# Patient Record
Sex: FEMALE | Race: WHITE | NOT HISPANIC OR LATINO | Employment: UNEMPLOYED | URBAN - METROPOLITAN AREA
[De-identification: names, ages, dates, MRNs, and addresses within clinical notes are randomized per-mention and may not be internally consistent; named-entity substitution may affect disease eponyms.]

---

## 2017-01-06 ENCOUNTER — TRANSFERRED RECORDS (OUTPATIENT)
Dept: HEALTH INFORMATION MANAGEMENT | Facility: CLINIC | Age: 32
End: 2017-01-06

## 2017-01-06 LAB
CREAT SERPL-MCNC: 1.69 MG/DL (ref 0.4–1)
GFR SERPL CREATININE-BSD FRML MDRD: 35 ML/MIN/1.73M2
GLUCOSE SERPL-MCNC: 187 MG/DL (ref 70–100)
POTASSIUM SERPL-SCNC: 3.7 MEQ/L (ref 3.4–5.1)

## 2017-02-17 ENCOUNTER — TRANSFERRED RECORDS (OUTPATIENT)
Dept: HEALTH INFORMATION MANAGEMENT | Facility: CLINIC | Age: 32
End: 2017-02-17

## 2017-02-27 ENCOUNTER — OFFICE VISIT (OUTPATIENT)
Dept: FAMILY MEDICINE | Facility: CLINIC | Age: 32
End: 2017-02-27
Payer: MEDICARE

## 2017-02-27 ENCOUNTER — ANTICOAGULATION THERAPY VISIT (OUTPATIENT)
Dept: ANTICOAGULATION | Facility: CLINIC | Age: 32
End: 2017-02-27
Payer: MEDICARE

## 2017-02-27 VITALS
TEMPERATURE: 99.4 F | HEART RATE: 87 BPM | DIASTOLIC BLOOD PRESSURE: 67 MMHG | HEIGHT: 65 IN | BODY MASS INDEX: 30.82 KG/M2 | WEIGHT: 185 LBS | SYSTOLIC BLOOD PRESSURE: 130 MMHG

## 2017-02-27 DIAGNOSIS — I82.622 DEEP VEIN THROMBOSIS (DVT) OF LEFT UPPER EXTREMITY, UNSPECIFIED CHRONICITY, UNSPECIFIED VEIN (H): ICD-10-CM

## 2017-02-27 DIAGNOSIS — Z51.81 ANTICOAGULATION GOAL OF INR 2 TO 3: ICD-10-CM

## 2017-02-27 DIAGNOSIS — Z79.01 ANTICOAGULATION GOAL OF INR 2 TO 3: ICD-10-CM

## 2017-02-27 DIAGNOSIS — Z79.01 LONG TERM CURRENT USE OF ANTICOAGULANT THERAPY: ICD-10-CM

## 2017-02-27 DIAGNOSIS — I82.409 DVT (DEEP VENOUS THROMBOSIS) (H): Primary | ICD-10-CM

## 2017-02-27 DIAGNOSIS — N18.30 CKD (CHRONIC KIDNEY DISEASE) STAGE 3, GFR 30-59 ML/MIN (H): ICD-10-CM

## 2017-02-27 DIAGNOSIS — I82.409 DVT (DEEP VENOUS THROMBOSIS) (H): ICD-10-CM

## 2017-02-27 DIAGNOSIS — E11.22 TYPE 2 DIABETES MELLITUS WITH STAGE 3 CHRONIC KIDNEY DISEASE, WITHOUT LONG-TERM CURRENT USE OF INSULIN (H): Primary | ICD-10-CM

## 2017-02-27 DIAGNOSIS — I10 ESSENTIAL HYPERTENSION: ICD-10-CM

## 2017-02-27 DIAGNOSIS — N18.30 TYPE 2 DIABETES MELLITUS WITH STAGE 3 CHRONIC KIDNEY DISEASE, WITHOUT LONG-TERM CURRENT USE OF INSULIN (H): Primary | ICD-10-CM

## 2017-02-27 DIAGNOSIS — C25.9 MALIGNANT NEOPLASM OF PANCREAS, UNSPECIFIED LOCATION OF MALIGNANCY (H): ICD-10-CM

## 2017-02-27 PROBLEM — F31.9 BIPOLAR DISORDER (H): Status: ACTIVE | Noted: 2017-02-27

## 2017-02-27 LAB
ANION GAP SERPL CALCULATED.3IONS-SCNC: 8 MMOL/L (ref 3–14)
BUN SERPL-MCNC: 26 MG/DL (ref 7–30)
CALCIUM SERPL-MCNC: 9.6 MG/DL (ref 8.5–10.1)
CHLORIDE SERPL-SCNC: 104 MMOL/L (ref 94–109)
CO2 SERPL-SCNC: 24 MMOL/L (ref 20–32)
CREAT SERPL-MCNC: 1.54 MG/DL (ref 0.52–1.04)
CREAT UR-MCNC: 148 MG/DL
ERYTHROCYTE [DISTWIDTH] IN BLOOD BY AUTOMATED COUNT: 14.5 % (ref 10–15)
GFR SERPL CREATININE-BSD FRML MDRD: 39 ML/MIN/1.7M2
GLUCOSE SERPL-MCNC: 84 MG/DL (ref 70–99)
HBA1C MFR BLD: 6.6 % (ref 4.3–6)
HCT VFR BLD AUTO: 38.3 % (ref 35–47)
HGB BLD-MCNC: 12.1 G/DL (ref 11.7–15.7)
INR PPP: 2.48 (ref 0.86–1.14)
MCH RBC QN AUTO: 29.7 PG (ref 26.5–33)
MCHC RBC AUTO-ENTMCNC: 31.6 G/DL (ref 31.5–36.5)
MCV RBC AUTO: 94 FL (ref 78–100)
MICROALBUMIN UR-MCNC: 27 MG/L
MICROALBUMIN/CREAT UR: 18.45 MG/G CR (ref 0–25)
PLATELET # BLD AUTO: 474 10E9/L (ref 150–450)
POTASSIUM SERPL-SCNC: 4 MMOL/L (ref 3.4–5.3)
RBC # BLD AUTO: 4.08 10E12/L (ref 3.8–5.2)
SODIUM SERPL-SCNC: 136 MMOL/L (ref 133–144)
WBC # BLD AUTO: 20 10E9/L (ref 4–11)

## 2017-02-27 PROCEDURE — 85027 COMPLETE CBC AUTOMATED: CPT | Performed by: NURSE PRACTITIONER

## 2017-02-27 PROCEDURE — 99203 OFFICE O/P NEW LOW 30 MIN: CPT | Performed by: NURSE PRACTITIONER

## 2017-02-27 PROCEDURE — 36415 COLL VENOUS BLD VENIPUNCTURE: CPT | Performed by: NURSE PRACTITIONER

## 2017-02-27 PROCEDURE — 99207 ZZC NO CHARGE NURSE ONLY: CPT

## 2017-02-27 PROCEDURE — 80048 BASIC METABOLIC PNL TOTAL CA: CPT | Performed by: NURSE PRACTITIONER

## 2017-02-27 PROCEDURE — 83036 HEMOGLOBIN GLYCOSYLATED A1C: CPT | Performed by: NURSE PRACTITIONER

## 2017-02-27 PROCEDURE — 82043 UR ALBUMIN QUANTITATIVE: CPT | Performed by: NURSE PRACTITIONER

## 2017-02-27 PROCEDURE — 85610 PROTHROMBIN TIME: CPT | Performed by: NURSE PRACTITIONER

## 2017-02-27 RX ORDER — CHLORTHALIDONE 25 MG/1
12.5 TABLET ORAL DAILY
COMMUNITY
End: 2017-04-13

## 2017-02-27 RX ORDER — LORATADINE 10 MG/1
10 TABLET ORAL EVERY MORNING
COMMUNITY
End: 2017-04-13

## 2017-02-27 RX ORDER — WARFARIN SODIUM 2 MG/1
2 TABLET ORAL
COMMUNITY
End: 2017-02-27

## 2017-02-27 RX ORDER — MONTELUKAST SODIUM 10 MG/1
10 TABLET ORAL AT BEDTIME
COMMUNITY
End: 2017-04-13

## 2017-02-27 RX ORDER — AMLODIPINE BESYLATE 5 MG/1
5 TABLET ORAL DAILY
COMMUNITY
End: 2017-04-13

## 2017-02-27 RX ORDER — TRAZODONE HYDROCHLORIDE 100 MG/1
100 TABLET ORAL AT BEDTIME
COMMUNITY
End: 2017-04-13

## 2017-02-27 RX ORDER — GLIPIZIDE 5 MG/1
5 TABLET ORAL
Qty: 30 TABLET | Refills: 3 | Status: SHIPPED | OUTPATIENT
Start: 2017-02-27 | End: 2017-04-13

## 2017-02-27 RX ORDER — FLUTICASONE PROPIONATE 50 MCG
2 SPRAY, SUSPENSION (ML) NASAL DAILY
COMMUNITY
End: 2017-04-13

## 2017-02-27 RX ORDER — LITHIUM CARBONATE 300 MG
300 TABLET ORAL EVERY EVENING
COMMUNITY
End: 2017-05-18

## 2017-02-27 RX ORDER — WARFARIN SODIUM 2 MG/1
TABLET ORAL
Qty: 30 TABLET | COMMUNITY
Start: 2017-02-27 | End: 2017-03-17

## 2017-02-27 RX ORDER — MULTIVIT-MIN/IRON/FOLIC ACID/K 18-600-40
1 CAPSULE ORAL DAILY
COMMUNITY
End: 2017-04-13

## 2017-02-27 RX ORDER — ARIPIPRAZOLE 30 MG/1
30 TABLET ORAL DAILY
COMMUNITY

## 2017-02-27 RX ORDER — NALTREXONE HYDROCHLORIDE 50 MG/1
50 TABLET, FILM COATED ORAL EVERY MORNING
COMMUNITY
End: 2018-01-09

## 2017-02-27 RX ORDER — CLOZAPINE 100 MG/1
100 TABLET ORAL
COMMUNITY
End: 2024-05-07

## 2017-02-27 NOTE — PROGRESS NOTES
"  SUBJECTIVE:                                                    Divina Delgadillo is a 31 year old female who presents to clinic today to establish care. The patient moved from [place] on 2/24, transferring care. She was placed in different adult foster care.  She has multiple health issues.   Divina has history of diabetes type 2, she is currently on Metformin 1000 mg TWICE DAILY, according to records from [facility] last A1C 6.2% in November 2016. Divina states that she still gets occasional diarrhea from Metformin, she never tried ER Metformin. She reports history of CKD stage 3 and according to her chart her last Creatinine level was 1.69 on 1/25/17, her baseline Creatinine is 1.36 with GFR between 35-45. It is unclear to me why this patient is still on Metformin, I will check her renal function today and her Creatinine still remains over 1.4 will d/c Metformin. She is also on Omeprazole, per patient statement have been taking it \"for years\", the patient reports that she cannot be off Omeprazole because it will significantly aggravate her GERD; she is also on Zantac and she states that she is following GERD diet.   The patient had DVT of the left upper extremity, radial vein 2 months ago. This was unprovoked DVT, no prior history of thrombosis, she is currently on Coumadin, she usually takes 4 mg every Wednesdays and 5 mg on other days, she checks INR's weekly. Her last INR was 3.7 on 2/22, per patient statement no Coumadin dose adjustments were made, her INR on 2/13 was 3.2 and on 2/6-1.4. She will need to remain on anticoagulation therapy for another 4 months.   She has history of hypertension, take Norvasc 5 mg daily PM< her BP is under good control.    The patient has history of neoplasm of the pancreas. She had splenectomy and partial pancreatic resection. She was following oncologist in [place].  The patient has history of bipolar disorder she is currently on Lithium Clozaril and Prozac. She is also on " "Revia, this was not discussed in clinic today why she is on Revia, will defer to psychiatrist.     Problem list and histories reviewed & adjusted, as indicated.  Additional history: as documented    Problem list, Medication list, Allergies, and Medical/Social/Surgical histories reviewed in EPIC and updated as appropriate.    ROS:  C: NEGATIVE for fever, chills, change in weight  E/M: NEGATIVE for ear, mouth and throat problems  R: NEGATIVE for significant cough or SOB  CV: NEGATIVE for chest pain, palpitations or peripheral edema  GI: NEGATIVE for nausea, abdominal pain, heartburn, or change in bowel habits  PSYCHIATRIC: NEGATIVE for changes in mood or affect    OBJECTIVE:                                                    /67 (BP Location: Right arm, Patient Position: Chair, Cuff Size: Adult Large)  Pulse 87  Temp 99.4  F (37.4  C) (Tympanic)  Ht 5' 4.5\" (1.638 m)  Wt 185 lb (83.9 kg)  BMI 31.26 kg/m2  Body mass index is 31.26 kg/(m^2).  GENERAL: healthy, alert and no distress  RESP: lungs clear to auscultation - no rales, rhonchi or wheezes  CV: regular rates and rhythm, grade 3/6 systolic murmur heard best over the apex, peripheral pulses strong and no peripheral edema  ABDOMEN: soft, nontender, no hepatosplenomegaly, no masses and bowel sounds normal  PSYCH: mentation appears normal, affect normal/bright    Diagnostic Test Results:  CBC  BMP  A1C  INR  Urine micro-albumin        ASSESSMENT/PLAN:                                                      BMI:   Estimated body mass index is 31.26 kg/(m^2) as calculated from the following:    Height as of this encounter: 5' 4.5\" (1.638 m).    Weight as of this encounter: 185 lb (83.9 kg).   Weight management plan: Discussed healthy diet and exercise guidelines and patient will follow up in 3 months in clinic to re-evaluate.      1. Deep vein thrombosis (DVT) of left upper extremity, unspecified chronicity, unspecified vein (H)  -unprovoked DVT, continue Coumadin " therapy for 6 months   - INR CLINIC REFERRAL  - INR  - CBC with platelets  -CBC showed leukocytosis and slight thrombocytosis likely due to psych meds-Lithium, recommended to discuss medications and side effects with psychiatrist. Med adjustment per psychiatrist.     2. Anticoagulation goal of INR 2 to 3  - INR CLINIC REFERRAL  - INR  - CARE COORDINATION REFERRAL  -INR 2.48  -continue same dosin mg on  and 5 mg on other days  -recheck INR in 1 week    3. Essential hypertension   -well controlled   - continue amLODIPine (NORVASC) 5 MG tablet; Take 5 mg by mouth daily  - NEPHROLOGY ADULT REFERRAL  - CARE COORDINATION REFERRAL    4. Malignant neoplasm of pancreas, unspecified location of malignancy (H)  - US Abdomen Complete; Future  - CARE COORDINATION REFERRAL  -refer to oncology     5. CKD (chronic kidney disease) stage 3, GFR 30-59 ml/min  - Basic metabolic panel  - NEPHROLOGY ADULT REFERRAL  - CBC with platelets  - CARE COORDINATION REFERRAL  -d/c Metformin    6. Type 2 diabetes mellitus with stage 3 chronic kidney disease, without long-term current use of insulin (H)  - Hemoglobin A1c  - Albumin Random Urine Quantitative  - CARE COORDINATION REFERRAL  - start glipiZIDE (GLUCOTROL) 5 MG tablet; Take 1 tablet (5 mg) by mouth every morning (before breakfast)  Dispense: 30 tablet; Refill: 3  -monitor BG's  -follow up in 3 months     See Patient Instructions    VERONIQUE Spears [facility]

## 2017-02-27 NOTE — NURSING NOTE
"Chief Complaint   Patient presents with     Establish Care     Referral     Request for referral to Nephrology, INR clinic, Care Corrdination, Dietician       Initial /67 (BP Location: Right arm, Patient Position: Chair, Cuff Size: Adult Large)  Pulse 87  Temp 99.4  F (37.4  C) (Tympanic)  Ht 5' 4.5\" (1.638 m)  Wt 185 lb (83.9 kg)  BMI 31.26 kg/m2 Estimated body mass index is 31.26 kg/(m^2) as calculated from the following:    Height as of this encounter: 5' 4.5\" (1.638 m).    Weight as of this encounter: 185 lb (83.9 kg).  Medication Reconciliation: complete    "

## 2017-02-27 NOTE — PROGRESS NOTES
ANTICOAGULATION FOLLOW-UP CLINIC VISIT    Patient Name:  Divina Delgadillo  Date:  2/27/2017  Contact Type:  Telephone/ Lorin from group home    SUBJECTIVE:     Patient Findings     Positives No Problem Findings    Comments Writer called and spoke with Lorin, the lady working with patient in the group home. Per her report, the most recent INRs listed in the chart may not be accurate as they came from patient's own memory. She has been taking 4mg Wednesdays, 5mg all other days. She has been on warfarin for 2 months, and needs to complete at least another 4 months. Lorin is very overwhelmed with all of the medications and appointments that need to be scheduled for the patient. A new consult was made for patient next week in [facility]. Since she had her INR drawn today, there is no reason she needs to come back tomorrow (appointment will be cancelled). According to the patient, she was having her INR drawn weekly.     Lorin will plan on bringing a list of patient's medications to her appointment next week.            OBJECTIVE    INR   Date Value Ref Range Status   02/27/2017 2.48 (H) 0.86 - 1.14 Final       ASSESSMENT / PLAN  No question data found.  Anticoagulation Summary as of 2/27/2017     INR goal 2.0-3.0   Today's INR 2.48   Maintenance plan 4 mg (2 mg x 2) on Wed; 5 mg (2 mg x 2.5) all other days   Full instructions 4 mg on Wed; 5 mg all other days   Weekly total 34 mg   Plan last modified Anastacia Oscar RN (2/27/2017)   Next INR check 3/7/2017   Priority INR   Target end date     Indications   DVT (deep venous thrombosis) (H) [I82.409]  Long term current use of anticoagulant therapy [Z79.01]         Anticoagulation Episode Summary     INR check location     Preferred lab     Send INR reminders to CL ANTICOAG POOL    Comments * Transfer of care, moved to new group home      Anticoagulation Care Providers     Provider Role Specialty Phone number    Jaleesa Velasquez APRN CNP Referring Nurse Practitioner  - Gerontology [phone]            See the Encounter Report to view Anticoagulation Flowsheet and Dosing Calendar (Go to Encounters tab in chart review, and find the Anticoagulation Therapy Visit)        Anastacia Oscar RN

## 2017-02-27 NOTE — MR AVS SNAPSHOT
After Visit Summary   2/27/2017    Divina Delgadillo    MRN: [id number]           Patient Information     Date Of Birth          1985        Visit Information        Provider Department      2/27/2017 10:00 AM Jaleesa Velasquez APRN [facility]        Today's Diagnoses     Type 2 diabetes mellitus with stage 1 chronic kidney disease, without long-term current use of insulin (H)    -  1    Deep vein thrombosis (DVT) of left upper extremity, unspecified chronicity, unspecified vein (H)        Anticoagulation goal of INR 2 to 3        Essential hypertension        CKD (chronic kidney disease) stage 2, GFR 60-89 ml/min        Malignant neoplasm of pancreas, unspecified location of malignancy (H)          Care Instructions    INR clinic referral , nephrology    Labs today: INR, electrolytes, kidney function, A1C, urine micro albumin test   Abdominal US            Thank you for choosing [facility].  You may be receiving a survey in the mail from [employer] regarding your visit today.  Please take a few minutes to complete and return the survey to let us know how we are doing.      If you have questions or concerns, please contact us via [employer] or you can contact your care team at [phone].    Our Clinic hours are:  Monday 6:40 am  to 7:00 pm  Tuesday -Friday 6:40 am to 5:00 pm    The [place] outpatient lab hours are:  Monday - Friday 6:10 am to 4:45 pm  Saturdays 7:00 am to 11:00 am  Appointments are required, call [phone]    If you have clinical questions after hours or would like to schedule an appointment,  call the clinic at [phone].          Follow-ups after your visit        Additional Services     INR CLINIC REFERRAL       Your provider has referred you to INR Services.    Please be aware that coverage of these services is subject to the terms and limitations of your health insurance plan.  Call member services at your health plan with any benefit or  coverage questions.    Indication for Anticoagulation: DVT (first time)  If nonstandard INR is desired, indicate goal range and explanation: INR 2-3  Expected Duration of Therapy: 6 Months            NEPHROLOGY ADULT REFERRAL       Your provider has referred you to: [facility]: [facility] [phone]   [email or web address]    Please be aware that coverage of these services is subject to the terms and limitations of your health insurance plan.  Call member services at your health plan with any benefit or coverage questions.      Reason for referral:  [place] eGFR < 60 ml/min AND blood presure persistently > 130/80 despite antihypertensive management.  Please bring the following to your appointment:    >>   Any x-rays, CTs or MRIs which have been performed.  Contact the facility where they were done to arrange for  prior to your scheduled appointment.   >>   List of current medications   >>   This referral request   >>   Any documents/labs given to you for this referral                  Future tests that were ordered for you today     Open Future Orders        Priority Expected Expires Ordered    US Abdomen Complete Routine 5/28/2017 2/27/2018 2/27/2017            Who to contact     If you have questions or need follow up information about today's clinic visit or your schedule please contact [facility] directly at [phone].  Normal or non-critical lab and imaging results will be communicated to you by MyChart, letter or phone within 4 business days after the clinic has received the results. If you do not hear from us within 7 days, please contact the clinic through MyChart or phone. If you have a critical or abnormal lab result, we will notify you by phone as soon as possible.  Submit refill requests through [employer] or call your pharmacy and they will forward the refill request to  "us. Please allow 3 business days for your refill to be completed.          Additional Information About Your Visit        MyChart Information     iGuiders lets you send messages to your doctor, view your test results, renew your prescriptions, schedule appointments and more. To sign up, go to [email or web address] . Click on \"Log in\" on the left side of the screen, which will take you to the Welcome page. Then click on \"Sign up Now\" on the right side of the page.     You will be asked to enter the access code listed below, as well as some personal information. Please follow the directions to create your username and password.     Your access code is: [email or web address]  Expires: 2017 11:05 AM     Your access code will  in 90 days. If you need help or a new code, please call your [place] clinic or [phone].        Care EveryWhere ID     This is your [facility] EveryWhere ID. This could be used by other organizations to access your [place] medical records  [id number]        Your Vitals Were     Pulse Temperature Height BMI (Body Mass Index)          87 99.4  F (37.4  C) (Tympanic) 5' 4.5\" (1.638 m) 31.26 kg/m2         Blood Pressure from Last 3 Encounters:   17 130/67   11 124/71   09 116/60    Weight from Last 3 Encounters:   17 185 lb (83.9 kg)   09 200 lb (90.7 kg)   09 192 lb 6.4 oz (87.3 kg)              We Performed the Following     Albumin Random Urine Quantitative     Basic metabolic panel     Hemoglobin A1c     INR CLINIC REFERRAL     INR     NEPHROLOGY ADULT REFERRAL          Today's Medication Changes          These changes are accurate as of: 17 11:05 AM.  If you have any questions, ask your nurse or doctor.               These medicines have changed or have updated prescriptions.        Dose/Directions    Clozapine 200 MG tablet   Commonly known as:  CLOZARIL   This may have changed:  Another medication with the same name was removed. Continue taking this " medication, and follow the directions you see here.   Changed by:  Jaleesa Velasquez APRN CNP        Dose:  200 mg   Take 200 mg by mouth 2 times daily   Refills:  0       metFORMIN 1000 MG tablet   Commonly known as:  GLUCOPHAGE   This may have changed:  Another medication with the same name was removed. Continue taking this medication, and follow the directions you see here.   Changed by:  Jaleesa Velasquez APRN CNP        Dose:  1000 mg   Take 1,000 mg by mouth 2 times daily (with meals)   Refills:  0                Primary Care Provider Office Phone # Fax #    VERONIQUE Bledsoe [phone]       [facility] [address] [address]        Thank you!     Thank you for choosing [facility]  for your care. Our goal is always to provide you with excellent care. Hearing back from our patients is one way we can continue to improve our services. Please take a few minutes to complete the written survey that you may receive in the mail after your visit with us. Thank you!             Your Updated Medication List - Protect others around you: Learn how to safely use, store and throw away your medicines at www.disposemymeds.org.          This list is accurate as of: 2/27/17 11:05 AM.  Always use your most recent med list.                   Brand Name Dispense Instructions for use    ABILIFY 30 MG tablet   Generic drug:  ARIPiprazole      Take 30 mg by mouth daily       amLODIPine 5 MG tablet    NORVASC     Take 5 mg by mouth daily       blood glucose lancets standard    no brand specified    1    needs lancet        blood glucose monitoring test strip    no brand specified     by In Vitro route daily Use to test blood sugar 1 times daily  And as needed ACCU Check Haylee Plus Test Strips       chlorthalidone 25 MG tablet    HYGROTON     Take 12.5 mg by mouth daily       Clozapine 200 MG tablet    CLOZARIL     Take 200 mg by mouth 2  times daily       KENNY VINYL GLOVES Misc     1 box , medium    Staff to use for applicatin of acne med.       FLUoxetine 20 MG capsule    PROzac     Take 20 mg by mouth daily       fluticasone 50 MCG/ACT spray    FLONASE     Spray 2 sprays into both nostrils daily       lithium 300 MG tablet      Take 300 mg by mouth every evening       metFORMIN 1000 MG tablet    GLUCOPHAGE     Take 1,000 mg by mouth 2 times daily (with meals)       montelukast 10 MG tablet    SINGULAIR     Take 10 mg by mouth At Bedtime       omeprazole 20 MG CR capsule    priLOSEC    60    1 capsule bid       PREVIDENT 5000 BOOSTER 1.1 % Pste   Generic drug:  Sodium Fluoride      Brush Teeth every morning and evening       PULMICORT FLEXHALER 90 MCG/ACT Aepb   Generic drug:  BUDESONIDE (INHALATION)      Inhale 1 puff into the lungs 2 times daily       ranitidine 300 MG tablet    ZANTAC     Take by mouth At Bedtime       traZODone 100 MG tablet    DESYREL     Take 100 mg by mouth At Bedtime       Vitamin D (Cholecalciferol) 1000 UNITS Tabs      Take 1 tablet by mouth daily       warfarin 2 MG tablet    COUMADIN     Take 2 mg by mouth 2 tablets every Wednesday, take 2 1/2 tabs all other days

## 2017-02-27 NOTE — MR AVS SNAPSHOT
Divina Delgadillo   2/27/2017   Anticoagulation Therapy Visit    Description:  31 year old female   Provider:  Anastacia Oscar, RN   Department:  [facility]           INR as of 2/27/2017     Today's INR 2.48      Anticoagulation Summary as of 2/27/2017     INR goal 2.0-3.0   Today's INR 2.48   Full instructions 4 mg on Wed; 5 mg all other days   Next INR check 3/7/2017    Indications   DVT (deep venous thrombosis) (H) [I82.409]  Long term current use of anticoagulant therapy [Z79.01]         Your next Anticoagulation Clinic appointment(s)     Mar 07, 2017 10:30 AM CST   [facility]agulation [facility] with CL [facility]   [facility]    [address] [address]   [phone]              February 2017 Details    Sun Mon Tue Wed Thu Fri Sat        1               2               3               4                 5               6               7               8               9               10               11                 12               13               14               15               16               17               18                 19               20               21               22               23               24               25                 26               27      5 mg   See details      28      5 mg              Date Details   02/27 This INR check               How to take your warfarin dose     To take:  5 mg Take 2.5 of the 2 mg tablets.           March 2017 Details    Sun Mon Tue Wed Thu Fri Sat        1      4 mg         2      5 mg         3      5 mg         4      5 mg           5      5 mg         6      5 mg         7            8               9               10               11                 12               13               14               15               16               17               18                 19               20               21               22               23               24                25                 26               27               28               29               30               31                 Date Details   No additional details    Date of next INR:  3/7/2017         How to take your warfarin dose     To take:  4 mg Take 2 of the 2 mg tablets.    To take:  5 mg Take 2.5 of the 2 mg tablets.

## 2017-02-27 NOTE — PATIENT INSTRUCTIONS
INR clinic referral , nephrology    Labs today: INR, electrolytes, kidney function, A1C, urine micro albumin test   Abdominal US            Thank you for choosing [facility].  You may be receiving a survey in the mail from [employer]apps regarding your visit today.  Please take a few minutes to complete and return the survey to let us know how we are doing.      If you have questions or concerns, please contact us via [employer] or you can contact your care team at [phone].    Our Clinic hours are:  Monday 6:40 am  to 7:00 pm  Tuesday -Friday 6:40 am to 5:00 pm    The [place] outpatient lab hours are:  Monday - Friday 6:10 am to 4:45 pm  Saturdays 7:00 am to 11:00 am  Appointments are required, call [phone]    If you have clinical questions after hours or would like to schedule an appointment,  call the clinic at [phone].

## 2017-02-28 ENCOUNTER — TELEPHONE (OUTPATIENT)
Dept: FAMILY MEDICINE | Facility: CLINIC | Age: 32
End: 2017-02-28

## 2017-02-28 DIAGNOSIS — N18.30 CKD (CHRONIC KIDNEY DISEASE) STAGE 3, GFR 30-59 ML/MIN (H): Primary | ICD-10-CM

## 2017-03-02 ENCOUNTER — HOSPITAL ENCOUNTER (OUTPATIENT)
Dept: ULTRASOUND IMAGING | Facility: CLINIC | Age: 32
Discharge: HOME | End: 2017-03-02
Attending: NURSE PRACTITIONER | Admitting: NURSE PRACTITIONER
Payer: MEDICARE

## 2017-03-02 DIAGNOSIS — C25.9 MALIGNANT NEOPLASM OF PANCREAS, UNSPECIFIED LOCATION OF MALIGNANCY (H): ICD-10-CM

## 2017-03-02 PROCEDURE — 76700 US EXAM ABDOM COMPLETE: CPT

## 2017-03-03 ENCOUNTER — CARE COORDINATION (OUTPATIENT)
Dept: CARE COORDINATION | Facility: CLINIC | Age: 32
End: 2017-03-03

## 2017-03-03 ENCOUNTER — TELEPHONE (OUTPATIENT)
Dept: FAMILY MEDICINE | Facility: CLINIC | Age: 32
End: 2017-03-03

## 2017-03-03 DIAGNOSIS — Z86.718 HISTORY OF DEEP VENOUS THROMBOSIS: ICD-10-CM

## 2017-03-03 DIAGNOSIS — I10 ESSENTIAL HYPERTENSION, BENIGN: ICD-10-CM

## 2017-03-03 DIAGNOSIS — N18.30 CRD (CHRONIC RENAL DISEASE), STAGE III (H): ICD-10-CM

## 2017-03-03 DIAGNOSIS — Z51.81 ANTICOAGULATION GOAL OF INR 2 TO 3: ICD-10-CM

## 2017-03-03 DIAGNOSIS — Z79.01 ANTICOAGULATION GOAL OF INR 2 TO 3: ICD-10-CM

## 2017-03-03 DIAGNOSIS — Z79.899 MEDICATION MANAGEMENT: Primary | ICD-10-CM

## 2017-03-03 NOTE — PROGRESS NOTES
Clinic Care Coordination Contact  OUTREACH    Referral Information:  Referral Source: PCP  Reason for Contact: New Referral        Clinical Concerns:  Current Medical Concerns: RN CC called to f/u with Lorin, Caregiver for pt at foster home. Pt moved into the foster home 1 week ago. Caregiver is overwhelmed with the amount of apts and medications the pt is on. RN CC reviewed recommendations with caregiver and all requested apts have been make. Reassurance provded to caregiver that she is doing a great job.   Current Behavioral Concerns: Pt sees Patrica Vargas in [place], next apt 3/23/17. Pt also sees a family Therapist - Laci Kruse [facility], see's this provider weekly.     Education Provided to patient: medications and apts      Clinical Pathway: None    Medication Management:  Medications reviewed. All questions answered. Home care will be ordered to assist caregiver in the home further with medication education. Request sent to PCP     Functional Status:  Transportation: Caregiver     Psychosocial:  Financial/Insurance: No concerns     Resources and Interventions:  Current Resources:  Requested home care orders     Goals:   Goal 1 Statement: Caregiver wants support to ensure patients medical and mental health needs are being met.  Goal 1 Progression Percent: 50%  Goal 1 Progression Date: 03/03/17  Frequency of Care Coordination: every 1-4 weeks        Plan:   1. Home care orders requested.   2. RN CC will mail out complex care plan and will f/u with caregiver again in 2-4 weeks.     Sobeida Greer RN-BSN  RN Clinic Care Coordinator  [facility]  [phone]

## 2017-03-03 NOTE — LETTER
[place] CARE COORDINATION  [address] [address]  Phone: [phone]      March 28, 2017      Divina Delgadillo c/o Lorin Knight  [address] [address]    Dear Divina,  I am the Clinic Care Coordinator that works with your primary care provider's clinic. I wanted to introduce myself and provide you with my contact information for you to be able to call me with any questions or concerns. Below is a description of what Clinic Care Coordination is and how I can further assist you.     The Clinic Care Coordinator role is a Registered Nurse and/or  who understands the health care system. The goal of Clinic Care Coordination is to help you manage your health and improve access to the [facility] system in the most efficient manner.  The Registered Nurse can assist you in meeting your health care goals by providing education, coordinating services, and strengthening the communication among your providers. The  can assist you with financial, behavioral, psychosocial, and chemical dependency and counseling/psychiatric resources.    Please feel free to keep this letter and contact information to contact me at [phone] with any further questions or concerns that may arise. We at [facility] are focused on providing you with the highest-quality healthcare experience possible and that all starts with you.       Sincerely,     Sobeida Greer    Enclosed: I have enclosed a copy of the Complex Care Plan. This has helpful information and goals that we have talked about. Please keep this in an easy to access place to use as needed.

## 2017-03-03 NOTE — TELEPHONE ENCOUNTER
RN CC spoke with Lorin, Caregiver at pt's new foster home. She is overwhelmed with medications and complex medical conditions.   Please sign off on home care referral. Order pended.     Sobeida Greer RN-BSN  RN Clinic Care Coordinator  [facility]  [phone]

## 2017-03-03 NOTE — LETTER
[facility]  Complex Care Plan  About Me  Patient Name:  Divina Delgadillo    YOB: 1985  Age:   32 year old   [place] MRN: [id number] Telephone Information:     Home Phone [phone]   Mobile [phone]       Address:    [address] [facility] [address] Email address:  No e-mail address on record      Emergency Contact(s)  Name Relationship Lgl Grd Work Phone Home Phone Mobile Phone   1. EDOUARD CLAY Grandparent No none [phone] none   2. GIGI DE PAZ Mother No none [phone] none   3. JUAN R DOAN Parent    [phone]           Primary language:  English     needed? No   [place] [facility]:  [phone] op. 1  Other communication barriers: No  Preferred Method of Communication:  Phone  Current living arrangement: I live in a private home ([facility])  Mobility Status/ Medical Equipment: Independent    Health Maintenance  Health Maintenance Reviewed: Up to date    My Access Plan  Medical Emergency 911   Primary Clinic Line [facility]- [phone]   24 Hour Appointment Line [phone] or  [phone]) (toll-free)   24 Hour Nurse Line [phone] (toll-free)   Preferred Urgent Care [facility], [phone]   Preferred Hospital [place], [place]  [phone]   Preferred Pharmacy MAIL ORDER - NO PHONE     Behavioral Health Crisis Line [facility], [phone] or 911     My Care Team Members  Patient Care Team       Relationship Specialty Notifications Start End    Jaleesa Velasquez APRN CNP PCP - General Nurse Practitioner - Gerontology  2/22/17     Phone: [phone] Fax: [phone]         [facility] [address] [address]    Sobeida Greer, RN Clinic Care Coordinator  Admissions 3/3/17     Phone: [phone] Fax: [phone]        Laci Kruse Therapist   3/28/17     Comment:  [facility] [employer] [facility]    Phone:  [phone]         Patrica Vargas Psychiatrist   3/28/17     Comment:  [facility] - not sure of location or # (? with [facility])    Lorin Knight Other (see comments)   3/28/17     Comment:  [facility] Caregiver    Phone: [phone]              My Care Plans  Self Management and Treatment Plan  Goals and (Comments)  Goal #1: Caregiver wants support to ensure patients medical and mental health needs are being met.         Action Plans on File:  None  Advance Care Plans/Directives Type: None       My Medical and Care Information  Problem List   Patient Active Problem List   Diagnosis     Esophageal reflux     non alcoholic fatty liver disease     HTN (hypertension)     HYPERLIPIDEMIA LDL GOAL <100     DVT (deep venous thrombosis) (H)     CKD (chronic kidney disease) stage 3, GFR 30-59 ml/min     Malignant neoplasm of pancreas, unspecified location of malignancy (H)     Type 2 diabetes mellitus with stage 3 chronic kidney disease, without long-term current use of insulin (H)     Anticoagulation goal of INR 2 to 3     Bipolar disorder (H)      Current Medications and Allergies:  See printed Medication Report.    Care Coordination Start Date: 03/03/17   Frequency of Care Coordination: every 1-4 weeks   Form Last Updated: 03/28/2017

## 2017-03-03 NOTE — LETTER
[facility]  [address] [address]  Phone: [phone]      March 28, 2017      Divina Delgadillo c/o Lorin Knight  [address] [facility] [address]    Dear Divina,  I am the Clinic Care Coordinator that works with your primary care provider's clinic. I wanted to introduce myself and provide you with my contact information for you to be able to call me with any questions or concerns. Below is a description of what Clinic Care Coordination is and how I can further assist you.     The Clinic Care Coordinator role is a Registered Nurse and/or  who understands the health care system. The goal of Clinic Care Coordination is to help you manage your health and improve access to the [facility] system in the most efficient manner.  The Registered Nurse can assist you in meeting your health care goals by providing education, coordinating services, and strengthening the communication among your providers. The  can assist you with financial, behavioral, psychosocial, and chemical dependency and counseling/psychiatric resources.    Please feel free to keep this letter and contact information to contact me at [phone] with any further questions or concerns that may arise. We at [facility] are focused on providing you with the highest-quality healthcare experience possible and that all starts with you.       Sincerely,     Sobeida Greer    Enclosed: I have enclosed a copy of the Complex Care Plan. This has helpful information and goals that we have talked about. Please keep this in an easy to access place to use as needed.

## 2017-03-06 NOTE — TELEPHONE ENCOUNTER
Lucila, will you be contacting them ?  (referral says home care will contact the patient)     Let us know if you need us to do anything else, thanks,   Bonnie Hawthorne RNC

## 2017-03-07 ENCOUNTER — TELEPHONE (OUTPATIENT)
Dept: FAMILY MEDICINE | Facility: CLINIC | Age: 32
End: 2017-03-07

## 2017-03-07 ENCOUNTER — ANTICOAGULATION THERAPY VISIT (OUTPATIENT)
Dept: ANTICOAGULATION | Facility: CLINIC | Age: 32
End: 2017-03-07
Payer: MEDICARE

## 2017-03-07 DIAGNOSIS — I82.409 DVT (DEEP VENOUS THROMBOSIS) (H): ICD-10-CM

## 2017-03-07 LAB — INR POINT OF CARE: 5.8 (ref 0.86–1.14)

## 2017-03-07 PROCEDURE — 99207 ZZC NO CHARGE NURSE ONLY: CPT

## 2017-03-07 PROCEDURE — 85610 PROTHROMBIN TIME: CPT | Mod: QW

## 2017-03-07 PROCEDURE — 36416 COLLJ CAPILLARY BLOOD SPEC: CPT

## 2017-03-07 NOTE — MR AVS SNAPSHOT
Divina Delgadillo   3/7/2017 10:30 AM   Anticoagulation Therapy Visit    Description:  31 year old female   Provider:  CL ANTI COAG   Department:  Cl Anticoag           INR as of 3/7/2017     Today's INR 5.8!      Anticoagulation Summary as of 3/7/2017     INR goal 2.0-3.0   Today's INR 5.8!   Full instructions 3/7: Hold; 3/8: Hold; Otherwise 4 mg on Wed; 5 mg all other days   Next INR check 3/10/2017    Indications   DVT (deep venous thrombosis) (H) [I82.409]         Description     Recheck INR 3 days, Fri, 3/10/17  No warfarin Tues or Wed, 5 mg Thurs      Your next Anticoagulation Clinic appointment(s)     Mar 10, 2017 11:30 AM CST   Anticoagulation Visit with CL ANTI COAG   [facility] ([facility])    [address] [facility] [address] [phone]              Contact Numbers     Please call [phone] to cancel and/or reschedule your appointment.  Please call [phone] with any problems or questions regarding your therapy          March 2017 Details    Sun Mon Tue Wed Thu Fri Sat        1               2               3               4                 5               6               7      Hold   See details      8      Hold         9      5 mg         10            11                 12               13               14               15               16               17               18                 19               20               21               22               23               24               25                 26               27               28               29               30               31                 Date Details   03/07 This INR check       Date of next INR:  3/10/2017         How to take your warfarin dose     To take:  5 mg Take 2.5 of the 2 mg tablets.    Hold Do not take your warfarin dose. See the Details table to the right for additional instructions.

## 2017-03-07 NOTE — PROGRESS NOTES
ANTICOAGULATION FOLLOW-UP CLINIC VISIT    Patient Name:  Divina Delgadillo  Date:  3/7/2017  Contact Type:  Face to Face    SUBJECTIVE:     Patient Findings     Positives Change in medications (glucophage was stopped and glipizide was started last week), Change in diet/appetite (pt does not eat any Vit K foods, does not like them but will try some spinach on a subway today.  She does not use alcohol and tobacco use is stable), Bruising (pt states she has bruises on her legs that are new in the last week ), Bleed (clinic visit) (pt reports some blood from her gums when she brushed her teeth last night), Unexplained INR or factor level change (no reason found for the elevated INR today,  pt and care giver answer no to the usual causes of elevating the  INR)    Comments Writer talked with caregiver, Lorin,  She confirmed that nothing has changed since her INR check last week.  Lorin does report that her blood sugar was 186 this morning, which is elevated for her.  (pt came to [facility] with her room mate and not her caregiver)  Writer reviewed s/s of bleeding, what to observe for and how to report if need and to seek immediate medical assistance for bleeding or head trauma.  Verbalized understanding, pt and care giver  Of note, pt does have non alcoholic  fatty liver disease           OBJECTIVE    INR Protime   Date Value Ref Range Status   03/07/2017 5.8 (A) 0.86 - 1.14 Final       ASSESSMENT / PLAN  INR assessment SUPRA    Recheck INR In: 3 DAYS    INR Location Clinic      Anticoagulation Summary as of 3/7/2017     INR goal 2.0-3.0   Today's INR 5.8!   Maintenance plan 4 mg (2 mg x 2) on Wed; 5 mg (2 mg x 2.5) all other days   Full instructions 3/7: Hold; 3/8: Hold; Otherwise 4 mg on Wed; 5 mg all other days   Weekly total 34 mg   Plan last modified Anastacia Oscar RN (2/27/2017)   Next INR check 3/10/2017   Priority INR   Target end date 6/27/2017    Indications   DVT (deep venous thrombosis) (H) [I82.409]          Anticoagulation Episode Summary     INR check location     Preferred lab     Send INR reminders to CL ANTICOAG POOL    Comments * Transfer of care, moved to new group home      Anticoagulation Care Providers     Provider Role Specialty Phone number    Jaleesa Velasquez APRN CNP Responsible Nurse Practitioner - Gerontology [phone]            See the Encounter Report to view Anticoagulation Flowsheet and Dosing Calendar (Go to Encounters tab in chart review, and find the Anticoagulation Therapy Visit)        Myrna Wiseman RN

## 2017-03-07 NOTE — TELEPHONE ENCOUNTER
Incoming records from [facility]. Given to provider for review and scan.    [facility]

## 2017-03-09 ENCOUNTER — CARE COORDINATION (OUTPATIENT)
Dept: CARE COORDINATION | Facility: CLINIC | Age: 32
End: 2017-03-09

## 2017-03-09 NOTE — TELEPHONE ENCOUNTER
RN CC spoke with [facility]. They are scheduled for the home care admission assessment on 3/10/17.     Sobeida Greer RN-BSN  RN Clinic Care Coordinator  [facility]  [phone]

## 2017-03-10 ENCOUNTER — ANTICOAGULATION THERAPY VISIT (OUTPATIENT)
Dept: ANTICOAGULATION | Facility: CLINIC | Age: 32
End: 2017-03-10
Payer: MEDICARE

## 2017-03-10 DIAGNOSIS — I82.409 DVT (DEEP VENOUS THROMBOSIS) (H): ICD-10-CM

## 2017-03-10 LAB — INR POINT OF CARE: 1.6 (ref 0.86–1.14)

## 2017-03-10 PROCEDURE — 36416 COLLJ CAPILLARY BLOOD SPEC: CPT

## 2017-03-10 PROCEDURE — 99207 ZZC NO CHARGE NURSE ONLY: CPT

## 2017-03-10 PROCEDURE — 85610 PROTHROMBIN TIME: CPT | Mod: QW

## 2017-03-10 NOTE — PROGRESS NOTES
ANTICOAGULATION FOLLOW-UP CLINIC VISIT    Patient Name:  Divina Delgadillo  Date:  3/10/2017  Contact Type:  Face to Face    SUBJECTIVE:     Patient Findings     Positives Change in diet/appetite (pt will eat Vit K foos 1-2 times a week ), Intentional hold of therapy (qas directed the last 2 days), No Problem Findings (no other changes or problems that could lower INR)    Comments Care giver came with pt today and took the printed material regarding warfarin therapy, questions answered           OBJECTIVE    INR Protime   Date Value Ref Range Status   03/10/2017 1.6 (A) 0.86 - 1.14 Final       ASSESSMENT / PLAN  INR assessment SUB    Recheck INR In: 1 WEEK    INR Location Clinic      Anticoagulation Summary as of 3/10/2017     INR goal 2.0-3.0   Today's INR 1.6!   Maintenance plan 4 mg (2 mg x 2) on Wed; 5 mg (2 mg x 2.5) all other days   Full instructions 3/10: 7 mg; Otherwise 4 mg on Wed; 5 mg all other days   Weekly total 34 mg   Plan last modified Anastacia Oscar RN (2/27/2017)   Next INR check 3/17/2017   Priority INR   Target end date 6/27/2017    Indications   DVT (deep venous thrombosis) (H) [I82.409]         Anticoagulation Episode Summary     INR check location     Preferred lab     Send INR reminders to CL ANTICOAG POOL    Comments * Transfer of care, moved to new group home      Anticoagulation Care Providers     Provider Role Specialty Phone number    HerreraechoJaleesa APRN CNP Responsible Nurse Practitioner - Gerontology [phone]            See the Encounter Report to view Anticoagulation Flowsheet and Dosing Calendar (Go to Encounters tab in chart review, and find the Anticoagulation Therapy Visit)        Myrna Wiseman, RN

## 2017-03-10 NOTE — MR AVS SNAPSHOT
Divina Delgadillo   3/10/2017 11:30 AM   Anticoagulation Therapy Visit    Description:  31 year old female   Provider:  CL ANTI COAG   Department:  Cl Anticoag           INR as of 3/10/2017     Today's INR 1.6!      Anticoagulation Summary as of 3/10/2017     INR goal 2.0-3.0   Today's INR 1.6!   Full instructions 3/10: 7 mg; Otherwise 4 mg on Wed; 5 mg all other days   Next INR check 3/17/2017    Indications   DVT (deep venous thrombosis) (H) [I82.409]         Description     Re check INR 1 week, 3/17/17  Take warfarin 7 mg daily,  Then 4 mg Wed, 5 mg Mon, Tues, Thurs, Sat, Sun      Your next Anticoagulation Clinic appointment(s)     Mar 17, 2017  9:45 AM CDT   Anticoagulation Visit with CL ANTI COAG   [facility] ([facility]    [address] [id number] [phone]              Contact Numbers     Please call [phone] to cancel and/or reschedule your appointment.  Please call [phone] with any problems or questions regarding your therapy          March 2017 Details    Sun Mon Tue Wed Thu Fri Sat        1               2               3               4                 5               6               7               8               9               10      7 mg   See details      11      5 mg           12      5 mg         13      5 mg         14      5 mg         15      4 mg         16      5 mg         17            18                 19               20               21               22               23               24               25                 26               27               28               29               30               31                 Date Details   03/10 This INR check       Date of next INR:  3/17/2017         How to take your warfarin dose     To take:  4 mg Take 2 of the 2 mg tablets.    To take:  5 mg Take 2.5 of the 2 mg tablets.    To take:  7 mg Take 3.5 of the 2 mg tablets.

## 2017-03-17 ENCOUNTER — ANTICOAGULATION THERAPY VISIT (OUTPATIENT)
Dept: ANTICOAGULATION | Facility: CLINIC | Age: 32
End: 2017-03-17
Payer: MEDICARE

## 2017-03-17 DIAGNOSIS — I82.409 DVT (DEEP VENOUS THROMBOSIS) (H): ICD-10-CM

## 2017-03-17 DIAGNOSIS — Z51.81 ANTICOAGULATION GOAL OF INR 2 TO 3: ICD-10-CM

## 2017-03-17 DIAGNOSIS — Z79.01 ANTICOAGULATION GOAL OF INR 2 TO 3: ICD-10-CM

## 2017-03-17 DIAGNOSIS — N18.30 CKD (CHRONIC KIDNEY DISEASE) STAGE 3, GFR 30-59 ML/MIN (H): ICD-10-CM

## 2017-03-17 DIAGNOSIS — C25.9 MALIGNANT NEOPLASM OF PANCREAS, UNSPECIFIED LOCATION OF MALIGNANCY (H): ICD-10-CM

## 2017-03-17 LAB
ALBUMIN SERPL-MCNC: 3 G/DL (ref 3.4–5)
ALP SERPL-CCNC: 231 U/L (ref 40–150)
ALT SERPL W P-5'-P-CCNC: 91 U/L (ref 0–50)
AST SERPL W P-5'-P-CCNC: 39 U/L (ref 0–45)
BILIRUB DIRECT SERPL-MCNC: <0.1 MG/DL (ref 0–0.2)
BILIRUB SERPL-MCNC: 0.3 MG/DL (ref 0.2–1.3)
CREAT SERPL-MCNC: 1.32 MG/DL (ref 0.52–1.04)
GFR SERPL CREATININE-BSD FRML MDRD: 47 ML/MIN/1.7M2
INR POINT OF CARE: 3.5 (ref 0.86–1.14)
PROT SERPL-MCNC: 7.6 G/DL (ref 6.8–8.8)

## 2017-03-17 PROCEDURE — 99207 ZZC NO CHARGE NURSE ONLY: CPT

## 2017-03-17 PROCEDURE — 85610 PROTHROMBIN TIME: CPT | Mod: QW

## 2017-03-17 PROCEDURE — 80076 HEPATIC FUNCTION PANEL: CPT | Performed by: NURSE PRACTITIONER

## 2017-03-17 PROCEDURE — 36416 COLLJ CAPILLARY BLOOD SPEC: CPT

## 2017-03-17 PROCEDURE — 82565 ASSAY OF CREATININE: CPT | Performed by: NURSE PRACTITIONER

## 2017-03-17 RX ORDER — WARFARIN SODIUM 2 MG/1
TABLET ORAL
Qty: 30 TABLET | Refills: 0 | COMMUNITY
Start: 2017-03-17 | End: 2017-03-24

## 2017-03-17 NOTE — PROGRESS NOTES
ANTICOAGULATION FOLLOW-UP CLINIC VISIT    Patient Name:  Divina Delgadillo  Date:  3/17/2017  Contact Type:  Face to Face    SUBJECTIVE:     Patient Findings     Positives Change in diet/appetite (pt had greens last night and will have a serving of greens today, then resume her usual weekly amount)    Comments We continue to find her weekly maintenance dose           OBJECTIVE    INR Protime   Date Value Ref Range Status   03/17/2017 3.5 (A) 0.86 - 1.14 Final       ASSESSMENT / PLAN  INR assessment SUPRA    Recheck INR In: 1 WEEK    INR Location Clinic      Anticoagulation Summary as of 3/17/2017     INR goal 2.0-3.0   Today's INR 3.5!   Maintenance plan 4 mg (2 mg x 2) on Fri; 5 mg (2 mg x 2.5) all other days   Full instructions 4 mg on Fri; 5 mg all other days   Weekly total 34 mg   Plan last modified Myrna Wiseman RN (3/17/2017)   Next INR check 3/24/2017   Priority INR   Target end date 6/27/2017    Indications   DVT (deep venous thrombosis) (H) [I82.409]         Anticoagulation Episode Summary     INR check location     Preferred lab     Send INR reminders to  [facility] POOL    Comments * Transfer of care, moved to new group home      Anticoagulation Care Providers     Provider Role Specialty Phone number    Jaleesa Velasquez APRN CNP Responsible Nurse Practitioner - Gerontology [phone]            See the Encounter Report to view Anticoagulation Flowsheet and Dosing Calendar (Go to Encounters tab in chart review, and find the Anticoagulation Therapy Visit)        Myrna Wiseman RN

## 2017-03-17 NOTE — MR AVS SNAPSHOT
Divina Delgadillo   3/17/2017 9:45 AM   Anticoagulation Therapy Visit    Description:  31 year old female   Provider:  EL ANTI COAG   Department:  Cl Anticoag           INR as of 3/17/2017     Today's INR 3.5!      Anticoagulation Summary as of 3/17/2017     INR goal 2.0-3.0   Today's INR 3.5!   Full instructions 4 mg on Fri; 5 mg all other days   Next INR check 3/24/2017    Indications   DVT (deep venous thrombosis) (H) [I82.409]         Description     Recheck INR 1 week, 3/24/17  Take warfarin 4 mg Fri, 5 mg rest of week       Your next Anticoagulation Clinic appointment(s)     Mar 24, 2017  1:45 PM CDT   An[facility] with CL ANTI COAG   [facility]    [address] [facility] [id number] [phone]              Contact Numbers     Please call [phone] to cancel and/or reschedule your appointment.  Please call [phone] with any problems or questions regarding your therapy          March 2017 Details    Sun Mon Tue Wed Thu Fri Sat        1               2               3               4                 5               6               7               8               9               10               11                 12               13               14               15               16               17      4 mg   See details      18      5 mg           19      5 mg         20      5 mg         21      5 mg         22      5 mg         23      5 mg         24            25                 26               27               28               29               30               31                 Date Details   03/17 This INR check       Date of next INR:  3/24/2017         How to take your warfarin dose     To take:  4 mg Take 2 of the 2 mg tablets.    To take:  5 mg Take 2.5 of the 2 mg tablets.

## 2017-03-22 ENCOUNTER — PRE VISIT (OUTPATIENT)
Dept: NEPHROLOGY | Facility: CLINIC | Age: 32
End: 2017-03-22

## 2017-03-22 NOTE — TELEPHONE ENCOUNTER
PRE VISIT PATIENT INFORMATION    1.  Reason for the visit:  HTN, CKD stage 3    2.  Referring provider and clinic name:  [facility]    3.  Provider managing care now?  PCP    4. Imaging? Yes, US abdomen    Renal Imaging? Yes in US of abdomen  Vascular Imaging? no    5.  Lab work? Yes    6. Records from outside facilities? No    Requested from outside? No  In Chart?     Hard Copy in Folder?     Has patient ever seen a Urologist? No    If yes, where?        REVIEW                                                      Medication reconciliation complete: Yes      Current Outpatient Prescriptions   Medication Sig Dispense Refill     warfarin (COUMADIN) 2 MG tablet Take 4 mg on Fri; 5 mg all other days or as directed by the Anticoagulation Clinic 30 tablet 0     Clozapine (CLOZARIL) 200 MG tablet Take 200 mg by mouth 2 times daily       ARIPiprazole (ABILIFY) 30 MG tablet Take 30 mg by mouth daily       chlorthalidone (HYGROTON) 25 MG tablet Take 12.5 mg by mouth daily       FLUoxetine (PROZAC) 20 MG capsule Take 20 mg by mouth daily       blood glucose monitoring (NO BRAND SPECIFIED) test strip by In Vitro route daily Use to test blood sugar 1 times daily  And as needed ACCU Check Haylee Plus Test Strips       Vitamin D, Cholecalciferol, 1000 UNITS TABS Take 1 tablet by mouth daily       fluticasone (FLONASE) 50 MCG/ACT spray Spray 2 sprays into both nostrils daily       BUDESONIDE, INHALATION, (PULMICORT FLEXHALER) 90 MCG/ACT AEPB Inhale 1 puff into the lungs 2 times daily       Sodium Fluoride (PREVIDENT 5000 BOOSTER) 1.1 % PSTE Brush Teeth every morning and evening       amLODIPine (NORVASC) 5 MG tablet Take 5 mg by mouth daily       montelukast (SINGULAIR) 10 MG tablet Take 10 mg by mouth At Bedtime       traZODone (DESYREL) 100 MG tablet Take 100 mg by mouth At Bedtime       lithium 300 MG tablet Take 300 mg by mouth every evening       ranitidine (ZANTAC) 300 MG tablet Take by mouth At Bedtime       loratadine  (CLARITIN) 10 MG tablet Take 10 mg by mouth every morning       naltrexone (DEPADE;REVIA) 50 MG tablet Take 50 mg by mouth every morning       glipiZIDE (GLUCOTROL) 5 MG tablet Take 1 tablet (5 mg) by mouth every morning (before breakfast) 30 tablet 3     OMEPRAZOLE 20 MG OR CPDR 1 capsule bid  60 5     LANCETS [facility] [place] [place]. KIT needs lancet   1 0     KENNY VINYL GLOVES MISC Staff to use for applicatin of acne med.  1 box , medium 6       Allergies: Latex and Tylenol [tylenol]      PLAN/FOLLOW-UP NEEDED                                                      Previsit review complete.  Patient will see provider at future scheduled appointment.     Patient Reminders Given:  Please, make sure you bring an updated list of your medications.   If you are having a procedure, please, present 15 minutes early.  If you need to cancel or reschedule,please call [phone].

## 2017-03-24 ENCOUNTER — ANTICOAGULATION THERAPY VISIT (OUTPATIENT)
Dept: ANTICOAGULATION | Facility: CLINIC | Age: 32
End: 2017-03-24
Payer: MEDICARE

## 2017-03-24 DIAGNOSIS — Z51.81 ANTICOAGULATION GOAL OF INR 2 TO 3: ICD-10-CM

## 2017-03-24 DIAGNOSIS — Z79.01 ANTICOAGULATION GOAL OF INR 2 TO 3: ICD-10-CM

## 2017-03-24 DIAGNOSIS — Z79.899 MEDICATION MANAGEMENT: Primary | ICD-10-CM

## 2017-03-24 DIAGNOSIS — I82.409 DVT (DEEP VENOUS THROMBOSIS) (H): ICD-10-CM

## 2017-03-24 LAB
BASOPHILS # BLD AUTO: 0.1 10E9/L (ref 0–0.2)
BASOPHILS NFR BLD AUTO: 0.8 %
DIFFERENTIAL METHOD BLD: ABNORMAL
EOSINOPHIL # BLD AUTO: 0.5 10E9/L (ref 0–0.7)
EOSINOPHIL NFR BLD AUTO: 3.6 %
ERYTHROCYTE [DISTWIDTH] IN BLOOD BY AUTOMATED COUNT: 15.2 % (ref 10–15)
HCT VFR BLD AUTO: 34.9 % (ref 35–47)
HGB BLD-MCNC: 11.1 G/DL (ref 11.7–15.7)
INR POINT OF CARE: 4.4 (ref 0.86–1.14)
LYMPHOCYTES # BLD AUTO: 4.9 10E9/L (ref 0.8–5.3)
LYMPHOCYTES NFR BLD AUTO: 33.3 %
MCH RBC QN AUTO: 28.8 PG (ref 26.5–33)
MCHC RBC AUTO-ENTMCNC: 31.8 G/DL (ref 31.5–36.5)
MCV RBC AUTO: 90 FL (ref 78–100)
MONOCYTES # BLD AUTO: 1 10E9/L (ref 0–1.3)
MONOCYTES NFR BLD AUTO: 6.6 %
NEUTROPHILS # BLD AUTO: 8.1 10E9/L (ref 1.6–8.3)
NEUTROPHILS NFR BLD AUTO: 55.7 %
PLATELET # BLD AUTO: 514 10E9/L (ref 150–450)
RBC # BLD AUTO: 3.86 10E12/L (ref 3.8–5.2)
WBC # BLD AUTO: 14.6 10E9/L (ref 4–11)

## 2017-03-24 PROCEDURE — 99207 ZZC NO CHARGE NURSE ONLY: CPT

## 2017-03-24 PROCEDURE — 36416 COLLJ CAPILLARY BLOOD SPEC: CPT

## 2017-03-24 PROCEDURE — 85025 COMPLETE CBC W/AUTO DIFF WBC: CPT | Performed by: CLINICAL NURSE SPECIALIST

## 2017-03-24 PROCEDURE — 85610 PROTHROMBIN TIME: CPT | Mod: QW

## 2017-03-24 RX ORDER — WARFARIN SODIUM 2 MG/1
TABLET ORAL
Qty: 30 TABLET | Refills: 0 | COMMUNITY
Start: 2017-03-24 | End: 2017-04-13

## 2017-03-24 NOTE — MR AVS SNAPSHOT
Divina Delgadillo   3/24/2017 1:45 PM   Anticoagulation Therapy Visit    Description:  32 year old female   Provider:  EL ANTI COAG   Department:  Cl Anticoag           INR as of 3/24/2017     Today's INR 4.4!      Anticoagulation Summary as of 3/24/2017     INR goal 2.0-3.0   Today's INR 4.4!   Full instructions 3/24: Hold; 3/25: 5 mg; 3/26: 5 mg; 3/27: 5 mg; 3/29: 5 mg; 3/30: 5 mg; Otherwise 6 mg on Wed; 4 mg all other days   Next INR check 3/31/2017    Indications   DVT (deep venous thrombosis) (H) [I82.409]         Description     Recheck INR 1 week 3/31/17  No warfarin today, then 4 mg Wed, 5 mg rest of week       Your next Anticoagulation Clinic appointment(s)     Mar 31, 2017  3:30 PM CDT   Anticoagulation Visit with CL ANTI COAG   [facility] ([facility])    [address] [address] [phone]              Contact Numbers     Please call [phone] to cancel and/or reschedule your appointment.  Please call [phone] with any problems or questions regarding your therapy          March 2017 Details    Sun Mon Tue Wed Thu Fri Sat        1               2               3               4                 5               6               7               8               9               10               11                 12               13               14               15               16               17               18                 19               20               21               22               23               24      Hold   See details      25      5 mg           26      5 mg         27      5 mg         28      4 mg         29      5 mg         30      5 mg         31              Date Details   03/24 This INR check       Date of next INR:  3/31/2017         How to take your warfarin dose     To take:  4 mg Take 2 of the 2 mg tablets.    To take:  5 mg Take 2.5 of the 2 mg tablets.    Hold Do not take your warfarin dose. See the  Details table to the right for additional instructions.

## 2017-03-24 NOTE — PROGRESS NOTES
ANTICOAGULATION FOLLOW-UP CLINIC VISIT    Patient Name:  Divina Delgadillo  Date:  3/24/2017  Contact Type:  Face to Face    SUBJECTIVE:     Patient Findings     Positives Change in medications (recent d/c of simvastatin due to lab work showing high creat and liver enzymes)    Comments Recent labs show that her albumin is low which can cause an elevated INR.  Writer spoke with her caregiver who reports that the pt is not on any special diet, she will try to help Divina increase the protein in her diet.           OBJECTIVE    INR Protime   Date Value Ref Range Status   03/24/2017 4.4 (A) 0.86 - 1.14 Final       ASSESSMENT / PLAN  INR assessment SUPRA    Recheck INR In: 1 WEEK    INR Location Clinic      Anticoagulation Summary as of 3/24/2017     INR goal 2.0-3.0   Today's INR 4.4!   Maintenance plan No maintenance plan   Full instructions 3/24: Hold; 3/25: 5 mg; 3/26: 5 mg; 3/27: 5 mg; 3/29: 5 mg; 3/30: 5 mg   Plan last modified Myrna Wiseman RN (3/24/2017)   Next INR check 3/31/2017   Priority INR   Target end date 6/27/2017    Indications   DVT (deep venous thrombosis) (H) [I82.409]         Anticoagulation Episode Summary     INR check location     Preferred lab     Send INR reminders to CL ANTICOAG POOL    Comments * Transfer of care, moved to new group home      Anticoagulation Care Providers     Provider Role Specialty Phone number    Eva VERONIQUE Collazo CNP Responsible Nurse Practitioner - Gerontology [phone]            See the Encounter Report to view Anticoagulation Flowsheet and Dosing Calendar (Go to Encounters tab in chart review, and find the Anticoagulation Therapy Visit)        Myrna Wiseman RN

## 2017-03-27 ENCOUNTER — OFFICE VISIT (OUTPATIENT)
Dept: NEPHROLOGY | Facility: CLINIC | Age: 32
End: 2017-03-27
Attending: NURSE PRACTITIONER
Payer: MEDICARE

## 2017-03-27 VITALS
SYSTOLIC BLOOD PRESSURE: 130 MMHG | BODY MASS INDEX: 31.77 KG/M2 | WEIGHT: 188 LBS | HEART RATE: 87 BPM | DIASTOLIC BLOOD PRESSURE: 62 MMHG

## 2017-03-27 DIAGNOSIS — F31.9 BIPOLAR AFFECTIVE DISORDER, REMISSION STATUS UNSPECIFIED (H): ICD-10-CM

## 2017-03-27 DIAGNOSIS — E11.22 TYPE 2 DIABETES MELLITUS WITH STAGE 3 CHRONIC KIDNEY DISEASE, WITHOUT LONG-TERM CURRENT USE OF INSULIN (H): ICD-10-CM

## 2017-03-27 DIAGNOSIS — N18.30 CKD (CHRONIC KIDNEY DISEASE) STAGE 3, GFR 30-59 ML/MIN (H): Primary | ICD-10-CM

## 2017-03-27 DIAGNOSIS — N18.30 TYPE 2 DIABETES MELLITUS WITH STAGE 3 CHRONIC KIDNEY DISEASE, WITHOUT LONG-TERM CURRENT USE OF INSULIN (H): ICD-10-CM

## 2017-03-27 PROCEDURE — 99204 OFFICE O/P NEW MOD 45 MIN: CPT | Performed by: INTERNAL MEDICINE

## 2017-03-27 NOTE — MR AVS SNAPSHOT
After Visit Summary   3/27/2017    Divina Delgadillo    MRN: [id number]           Patient Information     Date Of Birth          1985        Visit Information        Provider Department      3/27/2017 2:00 PM Sánchez Dias MD [facility]        Today's Diagnoses     CKD (chronic kidney disease) stage 3, GFR 30-59 ml/min    -  1      Care Instructions    1. Labs monthly for the next 2 months  2. Follow-up in 3 months  3. I will send a message to Dr. Rivero regarding the lithium.          Follow-ups after your visit        Your next 10 appointments already scheduled     Mar 31, 2017  3:30 PM CDT   Anticoagulation Visit with CL ANTI COAG   [facility] ([facility])    [address] [facility] [address]   [phone]            Jun 27, 2017 10:00 AM CDT   LAB with LAB FIRST FLOOR [facility])    [id number] [place] [address] [phone]           Patient must bring picture ID.  Patient should be prepared to give a urine specimen  Please do not eat 10-12 hours before your appointment if you are coming in fasting for labs on lipids, cholesterol, or glucose (sugar).  Pregnant women should follow their Care Team instructions. Water with medications is okay. Do not drink coffee or other fluids.   If you have concerns about taking  your medications, please ask at office or if scheduling via [employer], send a message by clicking on Secure Messaging, Message Your Care Team.            Jun 27, 2017 10:30 AM CDT   Return Visit with Sánchez Dias MD   [facility] ([facility])    [address] [place] [id number] [phone]              Future tests that were ordered for you today     Open Standing Orders        Priority Remaining Interval Expires Ordered    Renal panel (Alb, BUN, Ca, Cl, CO2, Creat, Gluc, Phos,  K, Na) Routine / monthly 3/27/2018 3/27/2017            Who to contact     If you have questions or need follow up information about today's clinic visit or your schedule please contact [facility] directly at [phone].  Normal or non-critical lab and imaging results will be communicated to you by MyChart, letter or phone within 4 business days after the clinic has received the results. If you do not hear from us within 7 days, please contact the clinic through MyChart or phone. If you have a critical or abnormal lab result, we will notify you by phone as soon as possible.  Submit refill requests through [employer]CH or call your pharmacy and they will forward the refill request to us. Please allow 3 business days for your refill to be completed.          Additional Information About Your Visit        DDVTECH Information     DDVTECH is an electronic gateway that provides easy, online access to your medical records. With DDVTECH, you can request a clinic appointment, read your test results, renew a prescription or communicate with your care team.     To sign up for DDVTECH visit the website at [email or web address]   You will be asked to enter the access code listed below, as well as some personal information. Please follow the directions to create your username and password.     Your access code is: [email or web address]  Expires: 2017 12:05 PM     Your access code will  in 90 days. If you need help or a new code, please contact your [facility] or call [phone] for assistance.        Care EveryWhere ID     This is your Care EveryWhere ID. This could be used by other organizations to access your [facility] medical records  [id number]        Your Vitals Were     Pulse Last Period BMI (Body Mass Index)             87 (LMP Unknown) 31.77 kg/m2          Blood Pressure from Last 3 Encounters:   17 130/62   17 130/67   11 124/71    Weight from  Last 3 Encounters:   03/27/17 85.3 kg (188 lb)   02/27/17 83.9 kg (185 lb)   07/20/09 90.7 kg (200 lb)              We Performed the Following     Albumin Random Urine Quantitative     Protein  random urine     UA with Microscopic reflex to Culture        Primary Care Provider Office Phone # Fax #    VERONIQUE Bledsoe [phone]       [facility] [address] [place] [address]        Thank you!     Thank you for choosing [facility]  for your care. Our goal is always to provide you with excellent care. Hearing back from our patients is one way we can continue to improve our services. Please take a few minutes to complete the written survey that you may receive in the mail after your visit with us. Thank you!             Your Updated Medication List - Protect others around you: Learn how to safely use, store and throw away your medicines at www.disposemymeds.org.          This list is accurate as of: 3/27/17  2:53 PM.  Always use your most recent med list.                   Brand Name Dispense Instructions for use    ABILIFY 30 MG tablet   Generic drug:  ARIPiprazole      Take 30 mg by mouth daily       amLODIPine 5 MG tablet    NORVASC     Take 5 mg by mouth daily       blood glucose lancets standard    no brand specified    1    needs lancet        blood glucose monitoring test strip    no brand specified     by In Vitro route daily Use to test blood sugar 1 times daily  And as needed ACCU Check Haylee Plus Test Strips       chlorthalidone 25 MG tablet    HYGROTON     Take 12.5 mg by mouth daily       Clozapine 200 MG tablet    CLOZARIL     Take 200 mg by mouth 2 times daily       KENNY VINYL GLOVES Misc     1 box , medium    Staff to use for applicatin of acne med.       FLUoxetine 20 MG capsule    PROzac     Take 20 mg by mouth daily       fluticasone 50 MCG/ACT spray    FLONASE     Spray 2 sprays into both nostrils daily       glipiZIDE 5  MG tablet    GLUCOTROL    30 tablet    Take 1 tablet (5 mg) by mouth every morning (before breakfast)       lithium 300 MG tablet      Take 300 mg by mouth every evening       loratadine 10 MG tablet    CLARITIN     Take 10 mg by mouth every morning       montelukast 10 MG tablet    SINGULAIR     Take 10 mg by mouth At Bedtime       naltrexone 50 MG tablet    DEPADE;REVIA     Take 50 mg by mouth every morning       omeprazole 20 MG CR capsule    priLOSEC    60    1 capsule bid       PREVIDENT 5000 BOOSTER 1.1 % Pste   Generic drug:  Sodium Fluoride      Brush Teeth every morning and evening       PULMICORT FLEXHALER 90 MCG/ACT Aepb   Generic drug:  BUDESONIDE (INHALATION)      Inhale 1 puff into the lungs 2 times daily       ranitidine 300 MG tablet    ZANTAC     Take by mouth At Bedtime       traZODone 100 MG tablet    DESYREL     Take 100 mg by mouth At Bedtime       Vitamin D (Cholecalciferol) 1000 UNITS Tabs      Take 1 tablet by mouth daily       warfarin 2 MG tablet    COUMADIN    30 tablet    As directed by Anticoagulation Clinic, currently establishing a maintenance dose

## 2017-03-27 NOTE — NURSING NOTE
"Divina Delgadillo's goals for this visit include:   Chief Complaint   Patient presents with     Consult       She requests these members of her care team be copied on today's visit information: pcp    PCP: Jaleesa Velasquez    Referring Provider:  VERONIQUE Bledsoe [facility]  [address] [place] [address]    Chief Complaint   Patient presents with     Consult       Initial Wt 85.3 kg (188 lb)  BMI 31.77 kg/m2 Estimated body mass index is 31.77 kg/(m^2) as calculated from the following:    Height as of 2/27/17: 1.638 m (5' 4.5\").    Weight as of this encounter: 85.3 kg (188 lb).  Medication Reconciliation: complete    Do you need any medication refills at today's visit? No    Sobeida Garcia CMA (AAMA)      "

## 2017-03-31 ENCOUNTER — ANTICOAGULATION THERAPY VISIT (OUTPATIENT)
Dept: ANTICOAGULATION | Facility: CLINIC | Age: 32
End: 2017-03-31
Payer: MEDICARE

## 2017-03-31 DIAGNOSIS — I82.409 DVT (DEEP VENOUS THROMBOSIS) (H): ICD-10-CM

## 2017-03-31 LAB — INR POINT OF CARE: 2.5 (ref 0.86–1.14)

## 2017-03-31 PROCEDURE — 99207 ZZC NO CHARGE NURSE ONLY: CPT

## 2017-03-31 PROCEDURE — 85610 PROTHROMBIN TIME: CPT | Mod: QW

## 2017-03-31 PROCEDURE — 36416 COLLJ CAPILLARY BLOOD SPEC: CPT

## 2017-03-31 NOTE — MR AVS SNAPSHOT
Divina Delgadillo   3/31/2017 3:30 PM   Anticoagulation Therapy Visit    Description:  32 year old female   Provider:  EL ANTI COAG   Department:  Cl Anticoag           INR as of 3/31/2017     Today's INR 2.5      Anticoagulation Summary as of 3/31/2017     INR goal 2.0-3.0   Today's INR 2.5   Full instructions 3/31: 2 mg; 4/1: 5 mg; 4/2: 5 mg; 4/3: 2 mg; 4/4: 5 mg; 4/5: 5 mg; 4/6: 5 mg   Next INR check 4/7/2017    Indications   DVT (deep venous thrombosis) (H) [I82.409]         Description     Warfarin dose: 2mg MF and 5mg the rest of the days of the week.        Your next Anticoagulation Clinic appointment(s)     Apr 07, 2017  3:00 PM CDT   Anticoagulation Visit with CL ANTI COAG   [facility] ([facility])    [address] [facility] [address] [phone]              Contact Numbers     Please call [phone] to cancel and/or reschedule your appointment.  Please call [phone] with any problems or questions regarding your therapy          March 2017 Details    Sun Mon Tue Wed Thu Fri Sat        1               2               3               4                 5               6               7               8               9               10               11                 12               13               14               15               16               17               18                 19               20               21               22               23               24               25                 26               27               28               29               30               31      2 mg   See details        Date Details   03/31 This INR check               How to take your warfarin dose     To take:  2 mg Take 1 of the 2 mg tablets.           April 2017 Details    Sun Mon Tue Wed Thu Fri Sat           1      5 mg           2      5 mg         3      2 mg         4      5 mg         5      5 mg         6      5 mg         7             8                 9               10               11               12               13               14               15                 16               17               18               19               20               21               22                 23               24               25               26               27               28               29                 30                      Date Details   No additional details    Date of next INR:  4/7/2017         How to take your warfarin dose     To take:  2 mg Take 1 of the 2 mg tablets.    To take:  5 mg Take 2.5 of the 2 mg tablets.

## 2017-03-31 NOTE — PROGRESS NOTES
ANTICOAGULATION FOLLOW-UP CLINIC VISIT    Patient Name:  Divina Delgadillo  Date:  3/31/2017  Contact Type:  Face to Face    SUBJECTIVE:     Patient Findings     Comments Patient states she is eating more protein. She shares with writer she is going to visit her grandparents in [place] in June and that her care provider is out of town in [place] but her daughter drove her to the appt but is out in the car.           OBJECTIVE    INR Protime   Date Value Ref Range Status   03/31/2017 2.5 (A) 0.86 - 1.14 Final       ASSESSMENT / PLAN  INR assessment THER    Recheck INR In: 1 WEEK    INR Location Clinic      Anticoagulation Summary as of 3/31/2017     INR goal 2.0-3.0   Today's INR 2.5   Maintenance plan No maintenance plan   Full instructions 3/31: 2 mg; 4/1: 5 mg; 4/2: 5 mg; 4/3: 2 mg; 4/4: 5 mg; 4/5: 5 mg; 4/6: 5 mg   Plan last modified Pamela Pedroza RN (3/31/2017)   Next INR check 4/7/2017   Priority INR   Target end date 6/27/2017    Indications   DVT (deep venous thrombosis) (H) [I82.409]         Anticoagulation Episode Summary     INR check location     Preferred lab     Send INR reminders to CL ANTICOAG POOL    Comments * Transfer of care, moved to new group home      Anticoagulation Care Providers     Provider Role Specialty Phone number    Jaleesa Velasquez APRN CNP Responsible Nurse Practitioner - Gerontology [phone]            See the Encounter Report to view Anticoagulation Flowsheet and Dosing Calendar (Go to Encounters tab in chart review, and find the Anticoagulation Therapy Visit)        Pamela Pedroza RN

## 2017-04-07 ENCOUNTER — ANTICOAGULATION THERAPY VISIT (OUTPATIENT)
Dept: ANTICOAGULATION | Facility: CLINIC | Age: 32
End: 2017-04-07
Payer: MEDICARE

## 2017-04-07 DIAGNOSIS — I82.409 DVT (DEEP VENOUS THROMBOSIS) (H): ICD-10-CM

## 2017-04-07 LAB — INR POINT OF CARE: 2.2 (ref 0.86–1.14)

## 2017-04-07 PROCEDURE — 85610 PROTHROMBIN TIME: CPT | Mod: QW

## 2017-04-07 PROCEDURE — 36416 COLLJ CAPILLARY BLOOD SPEC: CPT

## 2017-04-07 PROCEDURE — 99207 ZZC NO CHARGE NURSE ONLY: CPT

## 2017-04-07 NOTE — PROGRESS NOTES
ANTICOAGULATION FOLLOW-UP CLINIC VISIT    Patient Name:  Divina Delgadillo  Date:  4/7/2017  Contact Type:  Face to Face    SUBJECTIVE:     Patient Findings     Positives Change in diet/appetite (pt is drinking carnation breakfast essentials drink, states she had 2 servings in the past week ), No Problem Findings (no other problems reported)           OBJECTIVE    INR Protime   Date Value Ref Range Status   04/07/2017 2.2 (A) 0.86 - 1.14 Final       ASSESSMENT / PLAN  INR assessment THER    Recheck INR In: 10 DAYS    INR Location Clinic      Anticoagulation Summary as of 4/7/2017     INR goal 2.0-3.0   Today's INR 2.2   Maintenance plan 2 mg (2 mg x 1) on Mon, Fri; 5 mg (2 mg x 2.5) all other days   Full instructions 2 mg on Mon, Fri; 5 mg all other days   Weekly total 29 mg   Plan last modified Myrna Wiseman RN (4/7/2017)   Next INR check 4/18/2017   Priority INR   Target end date 6/27/2017    Indications   DVT (deep venous thrombosis) (H) [I82.409]         Anticoagulation Episode Summary     INR check location     Preferred lab     Send INR reminders to CL ANTICOAG POOL    Comments * Transfer of care, moved to new group home      Anticoagulation Care Providers     Provider Role Specialty Phone number    Jaleesa Velasquez APRN CNP Responsible Nurse Practitioner - Gerontology [phone]            See the Encounter Report to view Anticoagulation Flowsheet and Dosing Calendar (Go to Encounters tab in chart review, and find the Anticoagulation Therapy Visit)        Myrna Wiseman RN

## 2017-04-07 NOTE — MR AVS SNAPSHOT
Divina Delgadillo   4/7/2017 3:00 PM   Anticoagulation Therapy Visit    Description:  32 year old female   Provider:  EL ANTI COAG   Department:  Cl Anticoag           INR as of 4/7/2017     Today's INR 2.2      Anticoagulation Summary as of 4/7/2017     INR goal 2.0-3.0   Today's INR 2.2   Full instructions 2 mg on Mon, Fri; 5 mg all other days   Next INR check 4/18/2017    Indications   DVT (deep venous thrombosis) (H) [I82.409]         Description     Recheck INR 10 days, 4/18/17  Continue warfarin 2 mg Mon, Fri, 5 mg rest of week       Your next Anticoagulation Clinic appointment(s)     Apr 18, 2017  1:15 PM CDT   Anticoagulation Visit with CL ANTI COAG   [facility]    [address] [facility] [id number] [phone]              Contact Numbers     Please call [phone] to cancel and/or reschedule your appointment.  Please call [phone] with any problems or questions regarding your therapy          April 2017 Details    Sun Mon Tue Wed Thu Fri Sat           1                 2               3               4               5               6               7      2 mg   See details      8      5 mg           9      5 mg         10      2 mg         11      5 mg         12      5 mg         13      5 mg         14      2 mg         15      5 mg           16      5 mg         17      2 mg         18            19               20               21               22                 23               24               25               26               27               28               29                 30                      Date Details   04/07 This INR check       Date of next INR:  4/18/2017         How to take your warfarin dose     To take:  2 mg Take 1 of the 2 mg tablets.    To take:  5 mg Take 2.5 of the 2 mg tablets.

## 2017-04-10 DIAGNOSIS — Z79.01 ANTICOAGULATION GOAL OF INR 2 TO 3: ICD-10-CM

## 2017-04-10 DIAGNOSIS — I10 ESSENTIAL HYPERTENSION: ICD-10-CM

## 2017-04-10 DIAGNOSIS — Z51.81 ANTICOAGULATION GOAL OF INR 2 TO 3: ICD-10-CM

## 2017-04-10 RX ORDER — AMLODIPINE BESYLATE 5 MG/1
5 TABLET ORAL DAILY
Qty: 30 TABLET | Status: CANCELLED | OUTPATIENT
Start: 2017-04-10

## 2017-04-10 RX ORDER — MONTELUKAST SODIUM 10 MG/1
10 TABLET ORAL AT BEDTIME
Qty: 30 TABLET | Status: CANCELLED | OUTPATIENT
Start: 2017-04-10

## 2017-04-10 RX ORDER — WARFARIN SODIUM 2 MG/1
TABLET ORAL
Qty: 30 TABLET | Refills: 0 | Status: CANCELLED | OUTPATIENT
Start: 2017-04-10

## 2017-04-10 RX ORDER — CHLORTHALIDONE 25 MG/1
12.5 TABLET ORAL DAILY
Qty: 30 TABLET | Status: CANCELLED | OUTPATIENT
Start: 2017-04-10

## 2017-04-10 NOTE — TELEPHONE ENCOUNTER
Warfarin    Last Written Prescription Date: Historical  Last Fill Qty: /, # refills: /  Last Office Visit with [facility], [facility] or [facility] prescribing provider: 02/27/17  Next 5 appointments (look out 90 days)     Jun 27, 2017 10:30 AM CDT   Return Visit with Sánchez Dias MD   [facility])    [address] [facility] [address]   [phone]                   Date and Result of Last PT/INR:   Lab Results   Component Value Date    INR 2.2 04/07/2017    INR 2.5 03/31/2017    INR 2.48 02/27/2017    INR 0.93 08/09/2006      Amlodipine    Last Written Prescription Date: Historical  Last Fill Quantity: /, # refills: /    Last Office Visit with [facility], [facility] or [facility] prescribing provider:  02/27/17   Future Office Visit:    Next 5 appointments (look out 90 days)     Jun 27, 2017 10:30 AM CDT   Return Visit with Sánchez Dias MD   [facility])    [address] [facility] [address]   [phone]                    BP Readings from Last 3 Encounters:   03/27/17 130/62   02/27/17 130/67   04/23/11 124/71     Chlorthalidone     Last Written Prescription Date: Historical  Last Fill Quantity: /, # refills: /  Last Office Visit with [facility], [facility] or [facility] prescribing provider: 02/27/17  Next 5 appointments (look out 90 days)     Jun 27, 2017 10:30 AM CDT   Return Visit with Sánchez Dias MD   [facility])    [address] [address] [place] [address]   [phone]              Omeprazole      Last Written Prescription Date: Historical  Last Fill Quantity: /,  # refills: /   Last Office Visit with [facility], [facility] or [facility] prescribing provider: 02/27/17                                         Next 5 appointments (look out 90 days)     Jun 27, 2017 10:30 AM CDT   Return Visit with Sánchez Dias MD   [facility] ([facility])    [address] [place] [address] [phone]                Potassium   Date Value Ref Range Status   02/27/2017 4.0 3.4 - 5.3 mmol/L Final     Creatinine   Date Value Ref Range Status   03/17/2017 1.32 (H) 0.52 - 1.04 mg/dL Final     Montelukast       Last Written Prescription Date: Historical  Last Fill Quantity: /, # refills: /    Last Office Visit with FMFRANCINE, R[facility] or [facility] prescribing provider:  02/27/17   Future Office Visit:    Next 5 appointments (look out 90 days)     Jun 27, 2017 10:30 AM CDT   Return Visit with Sánchez Dias MD   [facility] ([facility])    [address] [place] [address] [phone]                   Date of Last Asthma Action Plan Letter:   There are no preventive care reminders to display for this patient.   Asthma Control Test: No flowsheet data found.    Date of Last Spirometry Test:   No results found for this or any previous visit.

## 2017-04-12 ENCOUNTER — CARE COORDINATION (OUTPATIENT)
Dept: CARE COORDINATION | Facility: CLINIC | Age: 32
End: 2017-04-12

## 2017-04-12 NOTE — TELEPHONE ENCOUNTER
RN CC spoke with pt's caregiver. Apt scheduled for pt to come in and see PCP about the medications.     Sobeida Greer RN, BSN, PHN   Clinic Care Coordinator  [facility]  [email or web address]   Office: [phone] Fax: [phone]

## 2017-04-12 NOTE — PROGRESS NOTES
Clinic Care Coordination Contact  Care Team Conversations    RN CC received a call from pt's caregiver, Lorin. She needs medication refills. Erica has provided refills on psych medications, but there are other medications and OTC medications that need to have updated Rx's. RN CC made an apt for pt to see her PCP tomorrow to address this as there are multiple medications.     Caregiver agreeable with pt.HALLIE WEBBER will f/u with Lorin again in 2-4 weeks. Lorin encouraged to call sooner if needed.     oSbeida Greer RN, BSN, PHN   Clinic Care Coordinator  [facility]:  [email or web address]   Office: [phone] Fax: [phone]

## 2017-04-13 ENCOUNTER — OFFICE VISIT (OUTPATIENT)
Dept: FAMILY MEDICINE | Facility: CLINIC | Age: 32
End: 2017-04-13
Payer: MEDICARE

## 2017-04-13 VITALS
DIASTOLIC BLOOD PRESSURE: 43 MMHG | BODY MASS INDEX: 31.36 KG/M2 | WEIGHT: 188.2 LBS | TEMPERATURE: 99.3 F | HEIGHT: 65 IN | SYSTOLIC BLOOD PRESSURE: 137 MMHG | HEART RATE: 90 BPM

## 2017-04-13 DIAGNOSIS — R94.5 ABNORMAL RESULTS OF LIVER FUNCTION STUDIES: ICD-10-CM

## 2017-04-13 DIAGNOSIS — E11.22 TYPE 2 DIABETES MELLITUS WITH STAGE 3 CHRONIC KIDNEY DISEASE, WITHOUT LONG-TERM CURRENT USE OF INSULIN (H): ICD-10-CM

## 2017-04-13 DIAGNOSIS — G47.00 INSOMNIA, UNSPECIFIED TYPE: ICD-10-CM

## 2017-04-13 DIAGNOSIS — Z79.01 ANTICOAGULATION GOAL OF INR 2 TO 3: ICD-10-CM

## 2017-04-13 DIAGNOSIS — J30.2 SEASONAL ALLERGIC RHINITIS, UNSPECIFIED ALLERGIC RHINITIS TRIGGER: ICD-10-CM

## 2017-04-13 DIAGNOSIS — K21.9 GASTROESOPHAGEAL REFLUX DISEASE WITHOUT ESOPHAGITIS: ICD-10-CM

## 2017-04-13 DIAGNOSIS — Z51.81 ANTICOAGULATION GOAL OF INR 2 TO 3: ICD-10-CM

## 2017-04-13 DIAGNOSIS — I10 ESSENTIAL HYPERTENSION: ICD-10-CM

## 2017-04-13 DIAGNOSIS — N18.30 TYPE 2 DIABETES MELLITUS WITH STAGE 3 CHRONIC KIDNEY DISEASE, WITHOUT LONG-TERM CURRENT USE OF INSULIN (H): ICD-10-CM

## 2017-04-13 DIAGNOSIS — E55.9 VITAMIN D DEFICIENCY: Primary | ICD-10-CM

## 2017-04-13 LAB
ALBUMIN SERPL-MCNC: 3.2 G/DL (ref 3.4–5)
ALP SERPL-CCNC: 183 U/L (ref 40–150)
ALT SERPL W P-5'-P-CCNC: 76 U/L (ref 0–50)
AST SERPL W P-5'-P-CCNC: 54 U/L (ref 0–45)
BILIRUB DIRECT SERPL-MCNC: <0.1 MG/DL (ref 0–0.2)
BILIRUB SERPL-MCNC: 0.2 MG/DL (ref 0.2–1.3)
CREAT SERPL-MCNC: 1.33 MG/DL (ref 0.52–1.04)
GFR SERPL CREATININE-BSD FRML MDRD: 47 ML/MIN/1.7M2
PROT SERPL-MCNC: 7.7 G/DL (ref 6.8–8.8)

## 2017-04-13 PROCEDURE — 99214 OFFICE O/P EST MOD 30 MIN: CPT | Performed by: NURSE PRACTITIONER

## 2017-04-13 PROCEDURE — 82565 ASSAY OF CREATININE: CPT | Performed by: NURSE PRACTITIONER

## 2017-04-13 PROCEDURE — 80076 HEPATIC FUNCTION PANEL: CPT | Performed by: NURSE PRACTITIONER

## 2017-04-13 PROCEDURE — 36415 COLL VENOUS BLD VENIPUNCTURE: CPT | Performed by: NURSE PRACTITIONER

## 2017-04-13 RX ORDER — NALTREXONE HYDROCHLORIDE 50 MG/1
50 TABLET, FILM COATED ORAL EVERY MORNING
Status: CANCELLED | OUTPATIENT
Start: 2017-04-13

## 2017-04-13 RX ORDER — AMLODIPINE BESYLATE 5 MG/1
5 TABLET ORAL DAILY
Qty: 90 TABLET | Refills: 1 | Status: SHIPPED | OUTPATIENT
Start: 2017-04-13 | End: 2017-09-07

## 2017-04-13 RX ORDER — ARIPIPRAZOLE 30 MG/1
30 TABLET ORAL DAILY
Status: CANCELLED | OUTPATIENT
Start: 2017-04-13

## 2017-04-13 RX ORDER — LORATADINE 10 MG/1
10 TABLET ORAL EVERY MORNING
Qty: 90 TABLET | Refills: 1 | Status: SHIPPED | OUTPATIENT
Start: 2017-04-13 | End: 2017-07-25

## 2017-04-13 RX ORDER — LITHIUM CARBONATE 300 MG
300 TABLET ORAL EVERY EVENING
Status: CANCELLED | OUTPATIENT
Start: 2017-04-13

## 2017-04-13 RX ORDER — MONTELUKAST SODIUM 10 MG/1
10 TABLET ORAL AT BEDTIME
Qty: 90 TABLET | Refills: 1 | Status: SHIPPED | OUTPATIENT
Start: 2017-04-13 | End: 2017-08-21

## 2017-04-13 RX ORDER — CLOZAPINE 200 MG/1
200 TABLET ORAL 2 TIMES DAILY
Qty: 60 TABLET | Status: CANCELLED | OUTPATIENT
Start: 2017-04-13

## 2017-04-13 RX ORDER — TRAZODONE HYDROCHLORIDE 100 MG/1
100 TABLET ORAL AT BEDTIME
Qty: 90 TABLET | Refills: 1 | Status: SHIPPED | OUTPATIENT
Start: 2017-04-13 | End: 2017-05-22 | Stop reason: SINTOL

## 2017-04-13 RX ORDER — FLUTICASONE PROPIONATE 50 MCG
2 SPRAY, SUSPENSION (ML) NASAL DAILY
Qty: 1 BOTTLE | Refills: 1 | Status: SHIPPED | OUTPATIENT
Start: 2017-04-13 | End: 2017-06-07

## 2017-04-13 RX ORDER — GLIPIZIDE 5 MG/1
5 TABLET ORAL
Qty: 60 TABLET | Refills: 2 | Status: SHIPPED | OUTPATIENT
Start: 2017-04-13 | End: 2017-05-17

## 2017-04-13 RX ORDER — WARFARIN SODIUM 2 MG/1
TABLET ORAL
Qty: 30 TABLET | Refills: 1 | Status: SHIPPED | OUTPATIENT
Start: 2017-04-13 | End: 2017-05-23

## 2017-04-13 RX ORDER — MULTIVIT-MIN/IRON/FOLIC ACID/K 18-600-40
1 CAPSULE ORAL DAILY
Qty: 90 TABLET | Refills: 3 | Status: SHIPPED | OUTPATIENT
Start: 2017-04-13 | End: 2017-12-07

## 2017-04-13 RX ORDER — CHLORTHALIDONE 25 MG/1
12.5 TABLET ORAL DAILY
Qty: 30 TABLET | Refills: 1 | Status: SHIPPED | OUTPATIENT
Start: 2017-04-13 | End: 2017-09-07

## 2017-04-13 NOTE — PATIENT INSTRUCTIONS
Increase Glipizide to 1 tablet twice daily    Labs today: Creatinine (kidney function)  Recheck liver function    Please discuss Lithium with psychiatrist: affecting liver function and causing elevation of WBC     Lithium is also affecting kidneys and not good combination with your water pill

## 2017-04-13 NOTE — MR AVS SNAPSHOT
After Visit Summary   4/13/2017    Divina Delgadillo    MRN: [id number]           Patient Information     Date Of Birth          1985        Visit Information        Provider Department      4/13/2017 1:40 PM Jaleesa Velasquez APRN CNP [facility]        Today's Diagnoses     Vitamin D deficiency    -  1    Anticoagulation goal of INR 2 to 3        Essential hypertension        Type 2 diabetes mellitus with stage 3 chronic kidney disease, without long-term current use of insulin (H)        Esophageal reflux        Insomnia, unspecified type        Seasonal allergic rhinitis, unspecified allergic rhinitis trigger        Gastroesophageal reflux disease without esophagitis          Care Instructions    Increase Glipizide to 1 tablet twice daily    Labs today: Creatinine (kidney function)  Recheck liver function    Please discuss Lithium with psychiatrist: affecting liver function and causing elevation of WBC     Lithium is also affecting kidneys and not good combination with your water pill              Follow-ups after your visit        Your next 10 appointments already scheduled     Apr 18, 2017  1:15 PM CDT   Anticoagulation Visit with CL ANTI COAG   [facility] ([facility])    [address] [facility] [address]   [phone]            Jun 27, 2017 10:00 AM CDT   LAB with LAB FIRST FLOOR [facility] ([facility])    [address] [place] [address] [phone]           Patient must bring picture ID.  Patient should be prepared to give a urine specimen  Please do not eat 10-12 hours before your appointment if you are coming in fasting for labs on lipids, cholesterol, or glucose (sugar).  Pregnant women should follow their Care Team instructions. Water with medications is okay. Do not drink coffee or other fluids.   If you have concerns about taking  your medications,  "please ask at office or if scheduling via YeHive, send a message by clicking on Secure Messaging, Message Your Care Team.            2017 10:30 AM CDT   Return Visit with Sánchez Dias MD   [facility] ([facility])    [address] [place] [address] [phone]              Who to contact     If you have questions or need follow up information about today's clinic visit or your schedule please contact [facility] directly at [phone].  Normal or non-critical lab and imaging results will be communicated to you by MyChart, letter or phone within 4 business days after the clinic has received the results. If you do not hear from us within 7 days, please contact the clinic through Manifactt or phone. If you have a critical or abnormal lab result, we will notify you by phone as soon as possible.  Submit refill requests through YeHive or call your pharmacy and they will forward the refill request to us. Please allow 3 business days for your refill to be completed.          Additional Information About Your Visit        YeHive Information     YeHive lets you send messages to your doctor, view your test results, renew your prescriptions, schedule appointments and more. To sign up, go to [email or web address] . Click on \"Log in\" on the left side of the screen, which will take you to the Welcome page. Then click on \"Sign up Now\" on the right side of the page.     You will be asked to enter the access code listed below, as well as some personal information. Please follow the directions to create your username and password.     Your access code is: [email or web address]  Expires: 2017 12:05 PM     Your access code will  in 90 days. If you need help or a new code, please call your [facility] or [phone].        Care EveryWhere ID     This is your Care EveryWhere ID. This could be used by other organizations to access your [place] " "medical records  [id number]        Your Vitals Were     Pulse Temperature Height Last Period BMI (Body Mass Index)       90 99.3  F (37.4  C) (Tympanic) 5' 4.5\" (1.638 m) (LMP Unknown) 31.81 kg/m2        Blood Pressure from Last 3 Encounters:   04/13/17 137/43   03/27/17 130/62   02/27/17 130/67    Weight from Last 3 Encounters:   04/13/17 188 lb 3.2 oz (85.4 kg)   03/27/17 188 lb (85.3 kg)   02/27/17 185 lb (83.9 kg)              We Performed the Following     Creatinine     Hepatic panel          Today's Medication Changes          These changes are accurate as of: 4/13/17  2:21 PM.  If you have any questions, ask your nurse or doctor.               These medicines have changed or have updated prescriptions.        Dose/Directions    blood glucose monitoring test strip   Commonly known as:  no brand specified   This may have changed:  how much to take   Used for:  Type 2 diabetes mellitus with stage 3 chronic kidney disease, without long-term current use of insulin (H)   Changed by:  Jaleesa Velasquez APRN CNP        Dose:  100 strip   100 strips by In Vitro route daily Use to test blood sugar 1 times daily  And as needed ACCU Check Haylee Plus Test Strips   Quantity:  100 each   Refills:  1       glipiZIDE 5 MG tablet   Commonly known as:  GLUCOTROL   This may have changed:  when to take this   Used for:  Type 2 diabetes mellitus with stage 3 chronic kidney disease, without long-term current use of insulin (H)   Changed by:  Jaleesa Velasquez APRN CNP        Dose:  5 mg   Take 1 tablet (5 mg) by mouth 2 times daily (before meals)   Quantity:  60 tablet   Refills:  2       omeprazole 20 MG CR capsule   Commonly known as:  priLOSEC   This may have changed:  See the new instructions.   Used for:  Gastroesophageal reflux disease without esophagitis   Changed by:  Jaleesa Velasquez APRN CNP        1 capsule bid   Quantity:  60 capsule   Refills:  5       ranitidine 300 MG tablet   Commonly known as: "  ZANTAC   This may have changed:  how much to take   Used for:  Gastroesophageal reflux disease without esophagitis   Changed by:  Jaleesa Velasquez APRN CNP        Dose:  300 mg   Take 1 tablet (300 mg) by mouth At Bedtime   Quantity:  90 tablet   Refills:  1            Where to get your medicines      These medications were sent to [place] [facility] - [place] - [address]  [address], [place] [address]    Hours:  [facility] Phone:  [phone]     amLODIPine 5 MG tablet    blood glucose monitoring test strip    chlorthalidone 25 MG tablet    fluticasone 50 MCG/ACT spray    glipiZIDE 5 MG tablet    loratadine 10 MG tablet    montelukast 10 MG tablet    omeprazole 20 MG CR capsule    ranitidine 300 MG tablet    traZODone 100 MG tablet    Vitamin D (Cholecalciferol) 1000 UNITS Tabs    warfarin 2 MG tablet                Primary Care Provider Office Phone # Fax #    VERONIQUE Bledsoe [phone]       [facility] [address] [facility] [address]        Thank you!     Thank you for choosing [facility]  for your care. Our goal is always to provide you with excellent care. Hearing back from our patients is one way we can continue to improve our services. Please take a few minutes to complete the written survey that you may receive in the mail after your visit with us. Thank you!             Your Updated Medication List - Protect others around you: Learn how to safely use, store and throw away your medicines at www.disposemymeds.org.          This list is accurate as of: 4/13/17  2:21 PM.  Always use your most recent med list.                   Brand Name Dispense Instructions for use    ABILIFY 30 MG tablet   Generic drug:  ARIPiprazole      Take 30 mg by mouth daily       amLODIPine 5 MG tablet    NORVASC    90 tablet    Take 1 tablet (5 mg) by mouth daily       blood glucose lancets standard    no brand  specified    1    needs lancet        blood glucose monitoring test strip    no brand specified    100 each    100 strips by In Vitro route daily Use to test blood sugar 1 times daily  And as needed ACCU Check Haylee Plus Test Strips       chlorthalidone 25 MG tablet    HYGROTON    30 tablet    Take 0.5 tablets (12.5 mg) by mouth daily       Clozapine 200 MG tablet    CLOZARIL     Take 200 mg by mouth 2 times daily       KENNY VINYL GLOVES Misc     1 box , medium    Staff to use for applicatin of acne med.       FLUoxetine 20 MG capsule    PROzac     Take 20 mg by mouth daily       fluticasone 50 MCG/ACT spray    FLONASE    1 Bottle    Spray 2 sprays into both nostrils daily       glipiZIDE 5 MG tablet    GLUCOTROL    60 tablet    Take 1 tablet (5 mg) by mouth 2 times daily (before meals)       lithium 300 MG tablet      Take 300 mg by mouth every evening       loratadine 10 MG tablet    CLARITIN    90 tablet    Take 1 tablet (10 mg) by mouth every morning       montelukast 10 MG tablet    SINGULAIR    90 tablet    Take 1 tablet (10 mg) by mouth At Bedtime       naltrexone 50 MG tablet    DEPADE;REVIA     Take 50 mg by mouth every morning       omeprazole 20 MG CR capsule    priLOSEC    60 capsule    1 capsule bid       PREVIDENT 5000 BOOSTER 1.1 % Pste   Generic drug:  Sodium Fluoride      Brush Teeth every morning and evening       PULMICORT FLEXHALER 90 MCG/ACT Aepb   Generic drug:  BUDESONIDE (INHALATION)      Inhale 1 puff into the lungs 2 times daily       ranitidine 300 MG tablet    ZANTAC    90 tablet    Take 1 tablet (300 mg) by mouth At Bedtime       traZODone 100 MG tablet    DESYREL    90 tablet    Take 1 tablet (100 mg) by mouth At Bedtime       Vitamin D (Cholecalciferol) 1000 UNITS Tabs     90 tablet    Take 1 tablet by mouth daily       warfarin 2 MG tablet    COUMADIN    30 tablet    As directed by Anticoagulation Clinic, currently establishing a maintenance dose

## 2017-04-13 NOTE — NURSING NOTE
"Chief Complaint   Patient presents with     Refill Request     All medications      Health Maintenance     Notified patient she is due for a pap.        Initial /43  Pulse 90  Temp 99.3  F (37.4  C) (Tympanic)  Ht 5' 4.5\" (1.638 m)  Wt 188 lb 3.2 oz (85.4 kg)  LMP  (LMP Unknown)  BMI 31.81 kg/m2 Estimated body mass index is 31.81 kg/(m^2) as calculated from the following:    Height as of this encounter: 5' 4.5\" (1.638 m).    Weight as of this encounter: 188 lb 3.2 oz (85.4 kg).  Medication Reconciliation: complete  "

## 2017-04-13 NOTE — LETTER
[facility]  [address] [facility] [address]  Phone: [phone]    April 17, 2017    Divina HOANG [place]  [address]  [facility] [address]              Dear Ms. Delgadillo,    Kidney function is stable. Liver function is improved after stopping Zocor, but still abnormal. They are slightly elevated. Recommend to recheck with a lab-only appointment in one month. If still abnormal at recheck, will need to see a gastrointestinal doctor for follow up.    Component      Latest Ref Rng & Units 4/13/2017   Bilirubin Direct      0.0 - 0.2 mg/dL <0.1   Bilirubin Total      0.2 - 1.3 mg/dL 0.2   Albumin      3.4 - 5.0 g/dL 3.2 (L)   Protein Total      6.8 - 8.8 g/dL 7.7   Alkaline Phosphatase      40 - 150 U/L 183 (H)   ALT      0 - 50 U/L 76 (H)   AST      0 - 45 U/L 54 (H)   Creatinine      0.52 - 1.04 mg/dL 1.33 (H)   GFR Estimate      >60 mL/min/1.7m2 47 (L)   GFR Estimate If Black      >60 mL/min/1.7m2 56 (L)                       Sincerely,      Jaleesa Velasquez, NP  / karime cma

## 2017-04-13 NOTE — PROGRESS NOTES
"  SUBJECTIVE:                                                    Divina Delgadillo is a 32 year old female who presents to clinic today for the following health issues:medication management.  The patient has history of diabetes, recently switched Metformin due to worsening CKD to Glipizide, started at 5 mg daily, the patient states that her blood sugars are somewhat higher than they were before when she was on Metformin, reports BG's ranging from 120-150'. Denies hypoglycemia, monitors BG's daily and following diabetic diet.  Has history of hypertension, well controlled on Norvasc 5 mg daily and Chlorthalidone 12.5 mg daily.   History of CKD stage 3, following nephrologist, renal function improved.  Still on Lithium, that is possibly affecting her renal function, plus concurrent therapy with Chlorthalidone that she takes for peripheral edema and BP, not a good combination with Lithium. She is going to see her psychiatrist soon and I advised her to discuss Lithium with her psychiatrist. Has history of elevated WBC that can be due to Lithium side effects.   History of GERD well controlled with Omeprazole 20 mg twice daily and Zantac 300 mg daily at bedtime, the patient states that there is no way she can stop Omeprazole, states \"my stomach and acid reflex will get worse. I did advised patient that long term therapy with PPI is generally not recommended.     Problem list and histories reviewed & adjusted, as indicated.  Additional history: as documented    Labs reviewed in EPIC    Reviewed and updated as needed this visit by clinical staff  Tobacco  Allergies  Med Hx  Surg Hx  Fam Hx  Soc Hx      Reviewed and updated as needed this visit by Provider         ROS:  Constitutional, HEENT, cardiovascular, pulmonary, gi and gu systems are negative, except as otherwise noted.    OBJECTIVE:                                                    /43  Pulse 90  Temp 99.3  F (37.4  C) (Tympanic)  Ht 5' 4.5\" (1.638 m)  " Wt 188 lb 3.2 oz (85.4 kg)  LMP  (LMP Unknown)  BMI 31.81 kg/m2  Body mass index is 31.81 kg/(m^2).  GENERAL: healthy, alert and no distress  RESP: lungs clear to auscultation - no rales, rhonchi or wheezes  CV: regular rate and rhythm, normal S1 S2, no S3 or S4, no murmur, click or rub, slight pitting bilateral LE edema and peripheral pulses strong  ABDOMEN: soft, nontender, no hepatosplenomegaly, no masses and bowel sounds normal  MS: no gross musculoskeletal defects noted.     Diagnostic Test Results:  LFT's-improved   Lab Results   Component Value Date    AST 54 04/13/2017     Lab Results   Component Value Date    ALT 76 04/13/2017     Creatinine-stable    Creatinine   Date Value Ref Range Status   04/13/2017 1.33 (H) 0.52 - 1.04 mg/dL Final   ]     ASSESSMENT/PLAN:                                                      1. Anticoagulation goal of INR 2 to 3  -history of unprovoked DVT, will continue Coumadin therapy for another 2 months, dosing per INR clinic  - warfarin (COUMADIN) 2 MG tablet; As directed by Anticoagulation Clinic, currently establishing a maintenance dose  Dispense: 30 tablet; Refill: 1    2. Essential hypertension  -well controlled   - chlorthalidone (HYGROTON) 25 MG tablet; Take 0.5 tablets (12.5 mg) by mouth daily  Dispense: 30 tablet; Refill: 1  - amLODIPine (NORVASC) 5 MG tablet; Take 1 tablet (5 mg) by mouth daily  Dispense: 90 tablet; Refill: 1  - Creatinine-stable  -advised to stop Lithium, patient will discuss this with her psychiatrist, renal function can be adversely affected by Lithium     3. Type 2 diabetes mellitus with stage 3 chronic kidney disease, without long-term current use of insulin (H)  - blood glucose monitoring (NO BRAND SPECIFIED) test strip; 100 strips by In Vitro route daily Use to test blood sugar 1 times daily  And as needed ACCU Check Haylee Plus Test Strips  Dispense: 100 each; Refill: 1  - glipiZIDE (GLUCOTROL) 5 MG tablet; Take 1 tablet (5 mg) by mouth 2 times  daily (before meals)  Dispense: 60 tablet; Refill: 2  -due to recheck A1C, urine micro albumin in 2 months  4. Vitamin D deficiency  - Vitamin D, Cholecalciferol, 1000 UNITS TABS; Take 1 tablet by mouth daily  Dispense: 90 tablet; Refill: 3    5. Insomnia, unspecified type  -well controlled with as needed Trazodone   - traZODone (DESYREL) 100 MG tablet; Take 1 tablet (100 mg) by mouth At Bedtime  Dispense: 90 tablet; Refill: 1    6. Seasonal allergic rhinitis, unspecified allergic rhinitis trigger    - fluticasone (FLONASE) 50 MCG/ACT spray; Spray 2 sprays into both nostrils daily  Dispense: 1 Bottle; Refill: 1  - montelukast (SINGULAIR) 10 MG tablet; Take 1 tablet (10 mg) by mouth At Bedtime  Dispense: 90 tablet; Refill: 1  - loratadine (CLARITIN) 10 MG tablet; Take 1 tablet (10 mg) by mouth every morning  Dispense: 90 tablet; Refill: 1    7. Gastroesophageal reflux disease without esophagitis  - ranitidine (ZANTAC) 300 MG tablet; Take 1 tablet (300 mg) by mouth At Bedtime  Dispense: 90 tablet; Refill: 1  - omeprazole (PRILOSEC) 20 MG CR capsule; 1 capsule bid  Dispense: 60 capsule; Refill: 5  -follow GERD diet    8. Abnormal results of liver function studies  - Hepatic panel-improved after she stopped Zocor, can also be due to side effects from Revia, patient not sure why she is taking Revia, will discuss with psychiatrist     See Patient Instructions, in 2 months for diabetes recheck     VERONIQUE Spears [facility]

## 2017-04-14 ENCOUNTER — TELEPHONE (OUTPATIENT)
Dept: FAMILY MEDICINE | Facility: CLINIC | Age: 32
End: 2017-04-14

## 2017-04-14 ASSESSMENT — PATIENT HEALTH QUESTIONNAIRE - PHQ9: SUM OF ALL RESPONSES TO PHQ QUESTIONS 1-9: 5

## 2017-04-14 NOTE — TELEPHONE ENCOUNTER
"Pharmacy has questions regarding medication.  \", we have sigs of 1 tab daily. Did you change directions on this?\"  Glipizide         Last Written Prescription Date: 04/13/2017  Last Fill Quantity: 60, # refills: 2  Last Office Visit with FMFRANCINE, REE or [facility] prescribing provider:  04/13/2017   Next 5 appointments (look out 90 days)     Jun 27, 2017 10:30 AM CDT   Return Visit with Sánchez Dias MD   [facility]    [address] [place] [address] [phone]                   BP Readings from Last 3 Encounters:   04/13/17 137/43   03/27/17 130/62   02/27/17 130/67     Lab Results   Component Value Date    MICROL 27 02/27/2017     Lab Results   Component Value Date    UMALCR 18.45 02/27/2017     Creatinine   Date Value Ref Range Status   04/13/2017 1.33 (H) 0.52 - 1.04 mg/dL Final   ]  GFR Estimate   Date Value Ref Range Status   04/13/2017 47 (L) >60 mL/min/1.7m2 Final     Comment:     Non  GFR Calc   03/17/2017 47 (L) >60 mL/min/1.7m2 Final     Comment:     Non  GFR Calc   02/27/2017 39 (L) >60 mL/min/1.7m2 Final     Comment:     Non  GFR Calc     GFR Estimate If Black   Date Value Ref Range Status   04/13/2017 56 (L) >60 mL/min/1.7m2 Final     Comment:      GFR Calc   03/17/2017 57 (L) >60 mL/min/1.7m2 Final     Comment:      GFR Calc   02/27/2017 48 (L) >60 mL/min/1.7m2 Final     Comment:      GFR Calc     Lab Results   Component Value Date    CHOL 208 03/12/2009     Lab Results   Component Value Date     03/12/2009     Lab Results   Component Value Date    LDL 80 03/12/2009     Lab Results   Component Value Date    TRIG 137 03/12/2009     Lab Results   Component Value Date    CHOLHDLRATIO 2.0 03/12/2009     Lab Results   Component Value Date    AST 54 04/13/2017     Lab Results   Component Value Date    ALT 76 04/13/2017     Lab " Results   Component Value Date    A1C 6.6 02/27/2017    A1C 6.1 03/12/2009    A1C 6.2 07/21/2008    A1C 6.1 06/27/2008    A1C 5.7 03/12/2008     Potassium   Date Value Ref Range Status   02/27/2017 4.0 3.4 - 5.3 mmol/L Final

## 2017-04-14 NOTE — TELEPHONE ENCOUNTER
I did increase Glipizide to 5 mg twice daily     Was ordered as to take 5 mg twice daily:     Take 1 tablet (5 mg) by mouth 2 times daily (before meals), Disp-60 tablet, R-2, E-Prescribe   Dose, Route, Frequency: 5 mg, Oral, 2 TIMES DAILY BEFORE MEALS  Start: 4/13/2017  Ord/Sold: 4/13/2017 (O)  Pharmacy: [facility] - [place], [place] - [address]    VERONIQUE Spears CNP

## 2017-04-14 NOTE — TELEPHONE ENCOUNTER
Jaleesa,  Please see message below  Can you clarify?  LOV 4/13/17    Routing to provider.    Iris KUNZ Rn

## 2017-04-15 NOTE — PROGRESS NOTES
March 27, 2017    I was asked to see this patient by Jaleesa Velasquez for evaluation of hypertension and elevated creatinine.     CC: elevated creatinine, hypertension    HPI: Divina Delgadillo is a 32 year old female who presents for evaluation of elevated creatinine and hypertension. Looking back, her creatinine was 0.89-1.1 from 2005 to 2009; 1.54 in Feb and 1.32 more recently on 3/17/17.  No proteinuria noted in Feb and ultrasound showed normal kidneys on ultrasound. She has had diabetes for around 20 years; maybe some neuropathy but no other known complications. Last A1C well controlled in Feb.  Ms. Delgadillo's hx is also significant for bipolar disease for which she follows with Patrica Rivero at [facility] in [place]. She has been on lithium therapy for 6 years as part of this treatment.     - History of Hematuria: has seen blood in the past but nothing for the past month  - Swelling: years  - Hx of UTIs: no  - Hx of stones: sister with hx of stones but she has no personal hx of stones  - Rashes/Joint pain: pain in feet; no rash  - Family hx of kidney disease: no  - NSAID use: no       Allergies   Allergen Reactions     Latex      Tylenol [Tylenol]          Current Outpatient Prescriptions on File Prior to Visit:  Clozapine (CLOZARIL) 200 MG tablet Take 200 mg by mouth 2 times daily   ARIPiprazole (ABILIFY) 30 MG tablet Take 30 mg by mouth daily   FLUoxetine (PROZAC) 20 MG capsule Take 20 mg by mouth daily   BUDESONIDE, INHALATION, (PULMICORT FLEXHALER) 90 MCG/ACT AEPB Inhale 1 puff into the lungs 2 times daily   Sodium Fluoride (PREVIDENT 5000 BOOSTER) 1.1 % PSTE Brush Teeth every morning and evening   lithium 300 MG tablet Take 300 mg by mouth every evening   LANCETS [place]. KIT needs lancet     KENNY VINYL GLOVES MISC Staff to use for applicatin of acne med.    naltrexone (DEPADE;REVIA) 50 MG tablet Take 50 mg by mouth every morning     No current facility-administered medications on file prior to  visit.     Past Medical History:   Diagnosis Date     Depressive disorder, not elsewhere classified      DVT (deep venous thrombosis) (H)      Dysmetabolic syndrome X      Esophageal reflux     GERD     Mild intermittent asthma      Other abnormal heart sounds     Heart murmur     Other specified types of schizophrenia, unspecified condition      Pancreatic cancer (H)     left adrenal gland, partial pancreas, and spleen removed; no chemo or radiation.      Type II or unspecified type diabetes mellitus without mention of complication, not stated as uncontrolled      Unspecified disorder of tympanic membrane        Past Surgical History:   Procedure Laterality Date     ADRENALECTOMY       PANCREATECTOMY PARTIAL       SPLENECTOMY       SURGICAL HISTORY OF -   10/1/2000    Tympanoplasty     SURGICAL HISTORY OF -   10/1/2000    Tonsillectomy       Social History   Substance Use Topics     Smoking status: Current Every Day Smoker     Packs/day: 1.00     Years: 4.00     Types: Cigarettes     Last attempt to quit: 6/20/2008     Smokeless tobacco: Not on file      Comment: 1 packe every other day      Alcohol use No       Family History   Problem Relation Age of Onset     Respiratory Mother      ASTHMA     Allergies Mother      Depression Mother      HEART DISEASE Father      HEART VALVE REPLACEMENT     Hypertension Father      DIABETES Father      Depression Father      Respiratory Brother      Allergies Brother      Respiratory Sister      Allergies Sister      Depression Sister      Respiratory Sister      Allergies Sister      Depression Sister      KIDNEY DISEASE No family hx of        ROS: A 12 system review of systems was negative other than noted here or above.     Exam:  /62  Pulse 87  Wt 85.3 kg (188 lb)  LMP  (LMP Unknown)  BMI 31.77 kg/m2    GENERAL APPEARANCE: alert and no distress  EYES: PERRL, no scleral icterus  HENT: mouth without ulcers or lesions  NECK: supple, no adenopathy  RESP: lungs clear to  auscultation   CV: regular rhythm, normal rate, no rub  Extremities: no clubbing, cyanosis  SKIN: no rash  NEURO: mentation intact and speech normal  PSYCH: affect normal/bright    Results:  No visits with results within 1 Day(s) from this visit.  Latest known visit with results is:    Orders Only on 03/24/2017   Component Date Value Ref Range Status     WBC 03/24/2017 14.6* 4.0 - 11.0 10e9/L Final     RBC Count 03/24/2017 3.86  3.8 - 5.2 10e12/L Final     Hemoglobin 03/24/2017 11.1* 11.7 - 15.7 g/dL Final     Hematocrit 03/24/2017 34.9* 35.0 - 47.0 % Final     MCV 03/24/2017 90  78 - 100 fl Final     MCH 03/24/2017 28.8  26.5 - 33.0 pg Final     MCHC 03/24/2017 31.8  31.5 - 36.5 g/dL Final     RDW 03/24/2017 15.2* 10.0 - 15.0 % Final     Platelet Count 03/24/2017 514* 150 - 450 10e9/L Final     Diff Method 03/24/2017 Automated Method   Final     % Neutrophils 03/24/2017 55.7  % Final     % Lymphocytes 03/24/2017 33.3  % Final     % Monocytes 03/24/2017 6.6  % Final     % Eosinophils 03/24/2017 3.6  % Final     % Basophils 03/24/2017 0.8  % Final     Absolute Neutrophil 03/24/2017 8.1  1.6 - 8.3 10e9/L Final     Absolute Lymphocytes 03/24/2017 4.9  0.8 - 5.3 10e9/L Final     Absolute Monocytes 03/24/2017 1.0  0.0 - 1.3 10e9/L Final     Absolute Eosinophils 03/24/2017 0.5  0.0 - 0.7 10e9/L Final     Absolute Basophils 03/24/2017 0.1  0.0 - 0.2 10e9/L Final       Assessment/Plan:   1. CKD STage 3: risk factors for kidney disease include longstanding hypertension as well as longterm lithium use. Will assess for hematuria/proteinuria today. Ideally would work on getting away from the lithium given the potential effects it could be playing on the kidney. Lithium can cause a chronic tubulointersitial nephritis which I question in her scenario. Even if this is the not the main cause of her kidney disease, and it is instead related to diabetes, It would be nice to see her away from the lithium given the high risk of  progression of diabetic kidney disease alone. Will get these tests today and determine a follow-up plan.    2. DM: last A1C well controlled at 6.6% last month.    3. Bipolar : follows with Patrica Rivero At [facility] in [place]. Will cc my note to her office to make sure she is aware of her  Level of kidney function. I would be happy to discuss her care by phone as well . I do not see that she has been affected by lithium in regards to diabetes insipidus at this point which is good to see. HOwever, as noted above, I am concerned about the potential of VENTURA with longterm lithium use in her case. Ideally would move away from this therapy, howeer, also understand the importance of good care of her psychiatric illness as well.     Patient Instructions   1. Labs monthly for the next 2 months  2. Follow-up in 3 months  3. I will send a message to Dr. Rivero regarding the lithium.       Sánchez Dias, DO

## 2017-04-18 ENCOUNTER — ANTICOAGULATION THERAPY VISIT (OUTPATIENT)
Dept: ANTICOAGULATION | Facility: CLINIC | Age: 32
End: 2017-04-18
Payer: MEDICARE

## 2017-04-18 DIAGNOSIS — N18.30 CKD (CHRONIC KIDNEY DISEASE) STAGE 3, GFR 30-59 ML/MIN (H): Primary | ICD-10-CM

## 2017-04-18 DIAGNOSIS — C25.9 MALIGNANT NEOPLASM OF PANCREAS, UNSPECIFIED LOCATION OF MALIGNANCY (H): Primary | ICD-10-CM

## 2017-04-18 DIAGNOSIS — Z79.899 MEDICATION MANAGEMENT: ICD-10-CM

## 2017-04-18 DIAGNOSIS — I82.409 DVT (DEEP VENOUS THROMBOSIS) (H): ICD-10-CM

## 2017-04-18 LAB
ALBUMIN SERPL-MCNC: 3.4 G/DL (ref 3.4–5)
ALBUMIN UR-MCNC: NEGATIVE MG/DL
ANION GAP SERPL CALCULATED.3IONS-SCNC: 9 MMOL/L (ref 3–14)
APPEARANCE UR: CLEAR
BACTERIA #/AREA URNS HPF: ABNORMAL /HPF
BILIRUB UR QL STRIP: NEGATIVE
BUN SERPL-MCNC: 24 MG/DL (ref 7–30)
CALCIUM SERPL-MCNC: 9.6 MG/DL (ref 8.5–10.1)
CHLORIDE SERPL-SCNC: 106 MMOL/L (ref 94–109)
CO2 SERPL-SCNC: 25 MMOL/L (ref 20–32)
COLOR UR AUTO: YELLOW
CREAT SERPL-MCNC: 1.41 MG/DL (ref 0.52–1.04)
CREAT UR-MCNC: 61 MG/DL
GFR SERPL CREATININE-BSD FRML MDRD: 43 ML/MIN/1.7M2
GLUCOSE SERPL-MCNC: 192 MG/DL (ref 70–99)
GLUCOSE UR STRIP-MCNC: NEGATIVE MG/DL
HGB UR QL STRIP: ABNORMAL
INR POINT OF CARE: 1.6 (ref 0.86–1.14)
KETONES UR STRIP-MCNC: NEGATIVE MG/DL
LEUKOCYTE ESTERASE UR QL STRIP: ABNORMAL
MICROALBUMIN UR-MCNC: 18 MG/L
MICROALBUMIN/CREAT UR: 29 MG/G CR (ref 0–25)
NITRATE UR QL: NEGATIVE
NON-SQ EPI CELLS #/AREA URNS LPF: ABNORMAL /LPF
PH UR STRIP: 6.5 PH (ref 5–7)
PHOSPHATE SERPL-MCNC: 3.2 MG/DL (ref 2.5–4.5)
POTASSIUM SERPL-SCNC: 3.7 MMOL/L (ref 3.4–5.3)
PROT UR-MCNC: 0.14 G/L
PROT/CREAT 24H UR: 0.22 G/G CR (ref 0–0.2)
RBC #/AREA URNS AUTO: ABNORMAL /HPF (ref 0–2)
SODIUM SERPL-SCNC: 140 MMOL/L (ref 133–144)
SP GR UR STRIP: 1.01 (ref 1–1.03)
URN SPEC COLLECT METH UR: ABNORMAL
UROBILINOGEN UR STRIP-ACNC: 0.2 EU/DL (ref 0.2–1)
WBC #/AREA URNS AUTO: ABNORMAL /HPF (ref 0–2)

## 2017-04-18 PROCEDURE — 85610 PROTHROMBIN TIME: CPT | Mod: QW

## 2017-04-18 PROCEDURE — 84156 ASSAY OF PROTEIN URINE: CPT | Performed by: INTERNAL MEDICINE

## 2017-04-18 PROCEDURE — 80069 RENAL FUNCTION PANEL: CPT | Performed by: CLINICAL NURSE SPECIALIST

## 2017-04-18 PROCEDURE — 81001 URINALYSIS AUTO W/SCOPE: CPT | Performed by: INTERNAL MEDICINE

## 2017-04-18 PROCEDURE — 99207 ZZC NO CHARGE NURSE ONLY: CPT

## 2017-04-18 PROCEDURE — 82043 UR ALBUMIN QUANTITATIVE: CPT | Performed by: INTERNAL MEDICINE

## 2017-04-18 PROCEDURE — 85025 COMPLETE CBC W/AUTO DIFF WBC: CPT | Performed by: CLINICAL NURSE SPECIALIST

## 2017-04-18 PROCEDURE — 36416 COLLJ CAPILLARY BLOOD SPEC: CPT

## 2017-04-18 NOTE — PROGRESS NOTES
ANTICOAGULATION FOLLOW-UP CLINIC VISIT    Patient Name:  Divina Delgadillo  Date:  4/18/2017  Contact Type:  Face to Face    SUBJECTIVE:     Patient Findings     Positives Change in diet/appetite (at least 3 more servings of Vit K than she usually eats, she will skip greens for 2 days then resume her usual 1-2 times a week ), Missed doses (pt and care giver state no missed doses)           OBJECTIVE    INR Protime   Date Value Ref Range Status   04/18/2017 1.6 (A) 0.86 - 1.14 Final       ASSESSMENT / PLAN  INR assessment SUB    Recheck INR In: 2 WEEKS    INR Location Clinic      Anticoagulation Summary as of 4/18/2017     INR goal 2.0-3.0   Today's INR 1.6!   Maintenance plan 2 mg (2 mg x 1) on Mon, Fri; 5 mg (2 mg x 2.5) all other days   Full instructions 4/18: 7 mg; Otherwise 2 mg on Mon, Fri; 5 mg all other days   Weekly total 29 mg   Plan last modified Myrna Wiseman RN (4/7/2017)   Next INR check 5/2/2017   Priority INR   Target end date 6/27/2017    Indications   DVT (deep venous thrombosis) (H) [I82.409]         Anticoagulation Episode Summary     INR check location     Preferred lab     Send INR reminders to CL ANTICOAG POOL    Comments * Transfer of care, moved to new group home      Anticoagulation Care Providers     Provider Role Specialty Phone number    Jaleesa Velasquez, VERONIQUE CNP Responsible Nurse Practitioner - Gerontology [phone]            See the Encounter Report to view Anticoagulation Flowsheet and Dosing Calendar (Go to Encounters tab in chart review, and find the Anticoagulation Therapy Visit)        Myrna Wiseman RN

## 2017-04-18 NOTE — MR AVS SNAPSHOT
Divina Delgadillo   4/18/2017 1:15 PM   Anticoagulation Therapy Visit    Description:  32 year old female   Provider:  [facility]   Department:  Cl Anticoag           INR as of 4/18/2017     Today's INR 1.6!      Anticoagulation Summary as of 4/18/2017     INR goal 2.0-3.0   Today's INR 1.6!   Full instructions 4/18: 7 mg; Otherwise 2 mg on Mon, Fri; 5 mg all other days   Next INR check 5/2/2017    Indications   DVT (deep venous thrombosis) (H) [I82.409]         Description     Recheck INR 2 weeks, 5/2/17  Take 7 mg today, then resume usual dose of 2 mg Mon, Fri, 5 mg rest of week          Your next Anticoagulation Clinic appointment(s)     May 02, 2017  2:45 PM CDT   [facility] with [facility]   [facility]    [id number] [id number] [phone]              Contact Numbers     Please call [phone] to cancel and/or reschedule your appointment.  Please call [phone] with any problems or questions regarding your therapy          April 2017 Details    Sun Mon Tue Wed Thu Fri Sat           1                 2               3               4               5               6               7               8                 9               10               11               12               13               14               15                 16               17               18      7 mg   See details      19      5 mg   See details      20      5 mg         21      2 mg         22      5 mg           23      5 mg         24      2 mg         25      5 mg         26      5 mg         27      5 mg         28      2 mg         29      5 mg           30      5 mg                Date Details   04/18 This INR check   Take 7 mg (2 mg tablets x 3.5)   no greens today      04/19 no greens today               How to take your warfarin dose     To take:  2 mg Take 1 of the 2 mg tablets.    To take:  5 mg Take 2.5 of the 2 mg  tablets.    To take:  7 mg Take 3.5 of the 2 mg tablets.           May 2017 Details    Sun Mon Tue Wed Thu Fri Sat      1      2 mg         2            3               4               5               6                 7               8               9               10               11               12               13                 14               15               16               17               18               19               20                 21               22               23               24               25               26               27                 28               29               30               31                   Date Details   No additional details    Date of next INR:  5/2/2017         How to take your warfarin dose     To take:  2 mg Take 1 of the 2 mg tablets.    To take:  5 mg Take 2.5 of the 2 mg tablets.

## 2017-04-19 LAB
BASOPHILS # BLD AUTO: 0.2 10E9/L (ref 0–0.2)
BASOPHILS NFR BLD AUTO: 1.2 %
DIFFERENTIAL METHOD BLD: ABNORMAL
EOSINOPHIL # BLD AUTO: 0.6 10E9/L (ref 0–0.7)
EOSINOPHIL NFR BLD AUTO: 3.6 %
ERYTHROCYTE [DISTWIDTH] IN BLOOD BY AUTOMATED COUNT: 15.9 % (ref 10–15)
HCT VFR BLD AUTO: 36.7 % (ref 35–47)
HGB BLD-MCNC: 11.4 G/DL (ref 11.7–15.7)
IMM GRANULOCYTES # BLD: 0.2 10E9/L (ref 0–0.4)
IMM GRANULOCYTES NFR BLD: 1.3 %
LYMPHOCYTES # BLD AUTO: 5.4 10E9/L (ref 0.8–5.3)
LYMPHOCYTES NFR BLD AUTO: 30.4 %
MCH RBC QN AUTO: 28.4 PG (ref 26.5–33)
MCHC RBC AUTO-ENTMCNC: 31.1 G/DL (ref 31.5–36.5)
MCV RBC AUTO: 92 FL (ref 78–100)
MONOCYTES # BLD AUTO: 1.2 10E9/L (ref 0–1.3)
MONOCYTES NFR BLD AUTO: 6.7 %
NEUTROPHILS # BLD AUTO: 10.1 10E9/L (ref 1.6–8.3)
NEUTROPHILS NFR BLD AUTO: 56.8 %
PLATELET # BLD AUTO: 515 10E9/L (ref 150–450)
PLATELET # BLD EST: ABNORMAL 10*3/UL
RBC # BLD AUTO: 4.01 10E12/L (ref 3.8–5.2)
RBC MORPH BLD: NORMAL
WBC # BLD AUTO: 17.7 10E9/L (ref 4–11)

## 2017-04-27 ENCOUNTER — TRANSFERRED RECORDS (OUTPATIENT)
Dept: HEALTH INFORMATION MANAGEMENT | Facility: CLINIC | Age: 32
End: 2017-04-27

## 2017-04-28 ENCOUNTER — TELEPHONE (OUTPATIENT)
Dept: NEPHROLOGY | Facility: CLINIC | Age: 32
End: 2017-04-28

## 2017-04-28 NOTE — TELEPHONE ENCOUNTER
Left message with Lorin at patient's group home. Asked for a return call to discuss the following message from Dr. Dias.    Your most recent lab results are attached.  Please call with any questions or concerns.     Team: I had left a message with Dr. Rivero at [facility] - I don't recall if I ended up talking to them but don't believe I did. Can you check with Divina to see if she is still on the lithium - if she is, please reach out to Dr. Rivero's clinic to assure they are aware of her current kidney state. Ideally would get away from lithium if able.   =Thank you, KW     Will await return call.    Shelley Vail, RN, BSN  Nephrology Care Coordinator  [facility]

## 2017-05-01 ENCOUNTER — OFFICE VISIT (OUTPATIENT)
Dept: PODIATRY | Facility: CLINIC | Age: 32
End: 2017-05-01
Payer: MEDICARE

## 2017-05-01 VITALS — HEIGHT: 65 IN | BODY MASS INDEX: 31.32 KG/M2 | WEIGHT: 188 LBS

## 2017-05-01 DIAGNOSIS — E11.43 TYPE II DIABETES MELLITUS WITH PERIPHERAL AUTONOMIC NEUROPATHY (H): Primary | ICD-10-CM

## 2017-05-01 DIAGNOSIS — M20.41 HAMMER TOES OF BOTH FEET: ICD-10-CM

## 2017-05-01 DIAGNOSIS — L84 CALLUS OF FOOT: ICD-10-CM

## 2017-05-01 DIAGNOSIS — M20.42 HAMMER TOES OF BOTH FEET: ICD-10-CM

## 2017-05-01 PROCEDURE — 11056 PARNG/CUTG B9 HYPRKR LES 2-4: CPT | Performed by: PODIATRIST

## 2017-05-01 PROCEDURE — 99203 OFFICE O/P NEW LOW 30 MIN: CPT | Mod: 25 | Performed by: PODIATRIST

## 2017-05-01 NOTE — PROGRESS NOTES
PATIENT HISTORY:  Divina Delgadillo is a 32 year old female who presents to clinic for a diabetic foot evaluation.  The patient relates pain with ambulation in shoe gear.  The patient relates that the nails are difficult to trim at home.  The patient relates blood sugars are within normal limits.  The patient denies any redness, swelling or open sores on both feet.       REVIEW OF SYSTEMS:  Constitutional, HEENT, cardiovascular, pulmonary, GI, , musculoskeletal, neuro, skin, endocrine and psych systems are negative, except as otherwise noted.     PAST MEDICAL HISTORY:   Past Medical History:   Diagnosis Date     Depressive disorder, not elsewhere classified      DVT (deep venous thrombosis) (H)      Dysmetabolic syndrome X      Esophageal reflux     GERD     Mild intermittent asthma      Other abnormal heart sounds     Heart murmur     Other specified types of schizophrenia, unspecified condition      Pancreatic cancer (H)     left adrenal gland, partial pancreas, and spleen removed; no chemo or radiation.      Type II or unspecified type diabetes mellitus without mention of complication, not stated as uncontrolled      Unspecified disorder of tympanic membrane         PAST SURGICAL HISTORY:   Past Surgical History:   Procedure Laterality Date     ADRENALECTOMY       PANCREATECTOMY PARTIAL       SPLENECTOMY       SURGICAL HISTORY OF -   10/1/2000    Tympanoplasty     SURGICAL HISTORY OF -   10/1/2000    Tonsillectomy        MEDICATIONS:   Current Outpatient Prescriptions:      warfarin (COUMADIN) 2 MG tablet, As directed by Anticoagulation Clinic, currently establishing a maintenance dose, Disp: 30 tablet, Rfl: 1     chlorthalidone (HYGROTON) 25 MG tablet, Take 0.5 tablets (12.5 mg) by mouth daily, Disp: 30 tablet, Rfl: 1     blood glucose monitoring (NO BRAND SPECIFIED) test strip, 100 strips by In Vitro route daily Use to test blood sugar 1 times daily  And as needed ACCU Check Haylee Plus Test Strips, Disp: 100  each, Rfl: 1     Vitamin D, Cholecalciferol, 1000 UNITS TABS, Take 1 tablet by mouth daily, Disp: 90 tablet, Rfl: 3     fluticasone (FLONASE) 50 MCG/ACT spray, Spray 2 sprays into both nostrils daily, Disp: 1 Bottle, Rfl: 1     amLODIPine (NORVASC) 5 MG tablet, Take 1 tablet (5 mg) by mouth daily, Disp: 90 tablet, Rfl: 1     montelukast (SINGULAIR) 10 MG tablet, Take 1 tablet (10 mg) by mouth At Bedtime, Disp: 90 tablet, Rfl: 1     traZODone (DESYREL) 100 MG tablet, Take 1 tablet (100 mg) by mouth At Bedtime, Disp: 90 tablet, Rfl: 1     ranitidine (ZANTAC) 300 MG tablet, Take 1 tablet (300 mg) by mouth At Bedtime, Disp: 90 tablet, Rfl: 1     loratadine (CLARITIN) 10 MG tablet, Take 1 tablet (10 mg) by mouth every morning, Disp: 90 tablet, Rfl: 1     glipiZIDE (GLUCOTROL) 5 MG tablet, Take 1 tablet (5 mg) by mouth 2 times daily (before meals), Disp: 60 tablet, Rfl: 2     omeprazole (PRILOSEC) 20 MG CR capsule, 1 capsule bid, Disp: 60 capsule, Rfl: 5     Clozapine (CLOZARIL) 200 MG tablet, Take 200 mg by mouth 2 times daily, Disp: , Rfl:      ARIPiprazole (ABILIFY) 30 MG tablet, Take 30 mg by mouth daily, Disp: , Rfl:      FLUoxetine (PROZAC) 20 MG capsule, Take 20 mg by mouth daily, Disp: , Rfl:      BUDESONIDE, INHALATION, (PULMICORT FLEXHALER) 90 MCG/ACT AEPB, Inhale 1 puff into the lungs 2 times daily, Disp: , Rfl:      Sodium Fluoride (PREVIDENT 5000 BOOSTER) 1.1 % PSTE, Brush Teeth every morning and evening, Disp: , Rfl:      lithium 300 MG tablet, Take 300 mg by mouth every evening, Disp: , Rfl:      naltrexone (DEPADE;REVIA) 50 MG tablet, Take 50 mg by mouth every morning, Disp: , Rfl:      LANCETS [facility], needs lancet  , Disp: 1, Rfl: 0     KENNY VINYL GLOVES MISC, Staff to use for applicatin of acne med. , Disp: 1 box , medium, Rfl: 6     ALLERGIES:    Allergies   Allergen Reactions     Latex      Tylenol [Tylenol]         SOCIAL HISTORY:   Social History     Social History     Marital status:  "Single     Spouse name: N/A     Number of children: 0     Years of education: N/A     Occupational History     Not on file.     Social History Main Topics     Smoking status: Current Every Day Smoker     Packs/day: 1.00     Years: 4.00     Types: Cigarettes     Last attempt to quit: 6/20/2008     Smokeless tobacco: Not on file      Comment: 1 packe every other day      Alcohol use No     Drug use: No     Sexual activity: Not Currently     Partners: Male     Other Topics Concern     Not on file     Social History Narrative        FAMILY HISTORY:   Family History   Problem Relation Age of Onset     Respiratory Mother      ASTHMA     Allergies Mother      Depression Mother      HEART DISEASE Father      HEART VALVE REPLACEMENT     Hypertension Father      DIABETES Father      Depression Father      Respiratory Brother      Allergies Brother      Respiratory Sister      Allergies Sister      Depression Sister      Respiratory Sister      Allergies Sister      Depression Sister      KIDNEY DISEASE No family hx of         EXAM:Vitals: Ht 1.638 m (5' 4.5\")  Wt 85.3 kg (188 lb)  BMI 31.77 kg/m2  BMI= Body mass index is 31.77 kg/(m^2).    Weight management plan: Patient was referred to their PCP to discuss a diet and exercise plan.    General appearance: Patient is alert and fully cooperative with history & exam.  No sign of distress is noted during the visit.     Psychiatric: Affect is pleasant & appropriate.  Patient appears motivated to improve health.     Respiratory: Breathing is regular & unlabored while sitting.     HEENT: Hearing is intact to spoken word.  Speech is clear.  No gross evidence of visual impairment that would impact ambulation.     Dermatologic: Skin is intact to both lower extremities without significant lesions, rash or abrasion.  No paronychia or evidence of soft tissue infection is noted.  The nails are hypertrophic and discolored.  Noted hyperkeratotic skin lesion on the plantar aspect of both " feet. No surrounding erythema. No subdermal hemorrhage noted.        Vascular: DP & PT pulses are intact & regular bilaterally.  No significant edema or varicosities noted.  CFT and skin temperature is normal to both lower extremities.  There are no varicosities, no edema and no trophic changes noted.      Neurologic: Lower extremity sensation is intact to light touch.  No evidence of weakness or contracture in the lower extremities.  No evidence of neuropathy.  Sensorium appears diminished to [place] Mary Monofilament test bilaterally.       Musculoskeletal: Patient is ambulatory without assistive device or brace.  No gross ankle deformity noted.  No foot or ankle joint effusion is noted.  One notes a hammertoe contracture of the second digit bilaterally.       Assessment:  1.  Diabetes Mellitus and Peripheral Neuropathy.    2.  Hammertoe deformity.    3.  Callus ×2      Plan:  I have explained to the patient the condition.  I have discussed with the patient the importance of diabetic foot care.  The calluses were sharply debrided with a #10 blade down to healthy epidermis with no bleeding noted.  The patient was referred to [facility] for custom diabetic shoes with accommodative inserts that will aid in offloading the tension forces to the soft tissues and prevent future risk of amputation.           ERNA Douglas D.P.M., AWILDA.[email or web address].

## 2017-05-01 NOTE — MR AVS SNAPSHOT
"              After Visit Summary   5/1/2017    Divina Delgadillo    MRN: [id number]           Patient Information     Date Of Birth          1985        Visit Information        Provider Department      5/1/2017 11:00 AM Raul Douglas DPM [facility]        Today's Diagnoses     Type II diabetes mellitus with peripheral autonomic neuropathy (H)    -  1    Callus of foot        Hammer toes of both feet          Care Instructions    DIABETES AND YOUR FEET    Diabetes can result in several problems in the feet including ulcers (open sores) and amputations. Two of the most important reasons why people develop foot problems when they have diabetes is : 1. Neuropathy (loss of feeling)  2. Vascular disease (loss or decrease of blood flow).    Neuropathy is a term used to describe a loss of nerve function.  Patients with diabetes are at risk of developing neuropathy if their sugars continue to run high and are above the normal value. One theory for neuropathy is that the \"extra\" sugar in the body enters the nerves and is broken down. These by-products build up in the nerve causing it to swell and impairing nerve function. Often times, this can be prevented by controlling your sugars, dieting and exercise.    When a person develops neuropathy, they usually begin to feel numbness or tingling in their feet and sometime in their legs.  Other symptoms may include painful burning or hot feet, tingling or feeling like insects or ants are crawling on your feet or legs.  If the diabetes is sever and the sugars run high for long periods of time, neuropathy can also occur in the hands.    Vascular disease is a term used to describe a loss or decrease in circulation (blood flow). There is a problem in getting blood and oxygen to areas that need it. Similar to neuropathy, sugars can build up in the walls of the arteries (blood vessels) and cause them to become swollen, thickened and " hardened. This decreases the amount of blood that can go to an area that needs it. Though this is common in the legs of diabetic patients, it can also affect other arteries (blood vessels) in the body such as in the heart and eyes.    In the legs, vascular disease usually results in cramping. Patients who develop leg cramps after walking the same distance every time (i.e. One block, half a mile, ect.) need to let their doctors know so that their circulation may be checked. Cramps causing severe pain in the feet and/or legs while sleeping and the cramps go away when you stand or hang your legs off the side of the bed, may also be a sign of poor blood circulation.  Occasional cramping in cold weather or on rare occasions with activity may not be due to poor circulation, but you should inform your doctor.    PREVENTION OF THESE DISEASES  The key to prevention is good blood sugar control. Poor blood sugar control is a big reason many of these problems start. Physical activity (exercise) is a very good way to help decrease your blood sugars. Exercise can lower your blood sugar, blood pressure, and cholesterol. It also reduces your risk for heart disease and stroke, relieves stress, and strengthens your heart, muscles and bones.  In addition, regular activity helps insulin work better, improves your blood circulation, and keeps your joints flexible. If you're trying to lose weight, a combination of exercise and wise food choices can help you reach your target weight and maintain it.      PAIN MANAGEMENT  1.Blood Sugar Control - Most important  2. Medications such as:  Amytriptylline, duloxetine, gabapentin, lyrica, tramadol  3. Nutritional therapy:  Vitamin B6 (100mg daily), Vitamin B12 (75mcg daily), Vitamin D 2000 IU daily), Alpha-Lipoic Acid (600-1800mg daily), Acetyl-L-Carnitine (500-1000mg TID, L-methyl folate (1500mcg daily)    ** Metformin can block Vitamin B6 and B12 so it is important to supplement**    FOOT CARE  RECOMMENDATIONS   1. Wash your feet with lukewarm water and a mild soap and then dry them thoroughly, especially between the toes.     2. Examine your feet daily looking for cuts, corns, blisters, cracks, ect, especially after wearing new shoes. Make sure to look between your toes. If you cannot see the bottom of your feet, set a mirror on the floor and hold your foot over it, or ask a spouse, friend or family member to examine your feet for you. Contact your doctor immediately if new problems are noted or if sores are not healing.     3. Immediately apply moisturizer to the tops and bottoms of your feet, avoiding areas between the toes. Hand lotion (Intesive Care, Joanna, Eucerin, Neutrogena, Curel, ect) is sufficient unless your doctor prescribes a medicated lotion. Apply sunscreen to your feet when going swimming outside.     4. Use clean comfortable shoes, wear white socks (if you have any bleeding or drainage, you will see it on white socks). Socks should not have thick seams or cut off the circulation around the leg. Break in new shoes slowly and rotate with older shoes until broken in. Check the inside of your shoes with your hand to look for areas of irritation or objects that may have fallen into your shoes.       5. Keep slippers by the side of your bed for use during the night.     6.  Shoes should be fitted by a professional and should not cause areas of irritation.  Check your feet regularly when wearing a new pair of shoes and replace them as needed.     7.  Talk to your doctor about proper exercise. Exercise and stretching stimulate blood flow to your feet and maintain proper glucose levels.     8.  Monitor your blood glucose level as instructed by your doctor. Notify your doctor immediately if your blood sugar is abnormally high or low.    9. Cut your nails straight across, but then gently round any sharp edges with a cardboard nail file. If you have neuropathy, peripheral vascular disease or cannot  see that well to trim your own toenails contact [facility] ([phone]) or [facility] ([phone]).      THINGS TO AVOID DOING   1.  Do not soak your feet if you have an open sore. Use only lukewarm water and always check the temperature with your hand as hot water can easily burn your feet.       2.  Never use a hot water bottle or heating pad on your feet. Also do not apply cold compresses to your feet. With decreased sensation, you could burn or freeze your feet.       3.  Do not apply any of these to your feet:    -  Over the counter medicine for corns or warts    -  Harsh chemicals like boric acid    -  Do not self-treat corns, cuts, blisters or infections. Always consult your doctor.       4.  Do not wear sandals, slippers or walk barefoot, especially on hot sand or concrete or other harsh surfaces.     5.  If you smoke, stop!!!                Follow-ups after your visit        Additional Services     ORTHOTICS REFERRAL       Please be aware that coverage of these services is subject to the terms and limitations of your health insurance plan.  Call member services at your health plan with any benefit or coverage questions.      Please bring the following to your appointment:    >>   Any x-rays, CTs or MRIs which have been performed.  Contact the facility where they were done to arrange for  prior to your scheduled appointment.  Any new CT, MRI or other procedures ordered by your specialist must be performed at a [facility] facility or coordinated by your clinic's referral office.    >>   List of current medications   >>   This referral request   >>   Any documents/labs given to you for this referral    ==This Referral PRINTS in the [facility] (ORTHOTICS & PROSTHETICS) Central scheduling office ==     The [facility] Central Scheduling staff will contact patient to arrange appointments. Central Scheduling Phone #:  [place]  [phone]     Orthotics: Bilateral Diabetic  Extradepth Shoe/s with three pair of accommodative inserts                  Follow-up notes from your care team     Return in about 6 months (around 11/1/2017).      Your next 10 appointments already scheduled     May 02, 2017  2:45 PM CDT   Anticoagulation Visit with CL ANTI COAG   [facility]    [address]  [facility] [address]   [phone]            Jun 27, 2017 10:00 AM CDT   LAB with LAB FIRST FLOOR [facility] ([facility])    [address] [place] [id number] [phone]           Patient must bring picture ID.  Patient should be prepared to give a urine specimen  Please do not eat 10-12 hours before your appointment if you are coming in fasting for labs on lipids, cholesterol, or glucose (sugar).  Pregnant women should follow their Care Team instructions. Water with medications is okay. Do not drink coffee or other fluids.   If you have concerns about taking  your medications, please ask at office or if scheduling via [employer], send a message by clicking on Secure Messaging, Message Your Care Team.            Jun 27, 2017 10:30 AM CDT   Return Visit with Sánchez Dias MD   [facility])    [address] [place] [id number] [phone]              Who to contact     If you have questions or need follow up information about today's clinic visit or your schedule please contact [facility] directly at [phone].  Normal or non-critical lab and imaging results will be communicated to you by MyChart, letter or phone within 4 business days after the clinic has received the results. If you do not hear from us within 7 days, please contact the clinic through MyChart or phone. If you have a critical or abnormal lab result, we will notify you by phone as soon as possible.  Submit refill requests  "through [employer] or call your pharmacy and they will forward the refill request to us. Please allow 3 business days for your refill to be completed.          Additional Information About Your Visit        MyChart Information     [employer] lets you send messages to your doctor, view your test results, renew your prescriptions, schedule appointments and more. To sign up, go to [email or web address] . Click on \"Log in\" on the left side of the screen, which will take you to the Welcome page. Then click on \"Sign up Now\" on the right side of the page.     You will be asked to enter the access code listed below, as well as some personal information. Please follow the directions to create your username and password.     Your access code is: [email or web address]  Expires: 2017 12:05 PM     Your access code will  in 90 days. If you need help or a new code, please call your [facility] clinic or [phone].        Care EveryWhere ID     This is your [facility] EveryWhere ID. This could be used by other organizations to access your [facility] medical records  [id number]        Your Vitals Were     Height BMI (Body Mass Index)                1.638 m (5' 4.5\") 31.77 kg/m2           Blood Pressure from Last 3 Encounters:   17 137/43   17 130/62   17 130/67    Weight from Last 3 Encounters:   17 85.3 kg (188 lb)   17 85.4 kg (188 lb 3.2 oz)   17 85.3 kg (188 lb)              We Performed the Following     ORTHOTICS REFERRAL     TRIM HYPERKERATOTIC SKIN LESION,2-4        Primary Care Provider Office Phone # Fax #    Jaleesa VERONIQUE Tarango [phone]       [facility] [address] [place] [id number]        Thank you!     Thank you for choosing [facility]  for your care. Our goal is always to provide you with excellent care. Hearing back from our patients is one way we can continue to improve our services. Please take a few " minutes to complete the written survey that you may receive in the mail after your visit with us. Thank you!             Your Updated Medication List - Protect others around you: Learn how to safely use, store and throw away your medicines at www.disposemymeds.org.          This list is accurate as of: 5/1/17 11:22 AM.  Always use your most recent med list.                   Brand Name Dispense Instructions for use    ABILIFY 30 MG tablet   Generic drug:  ARIPiprazole      Take 30 mg by mouth daily       amLODIPine 5 MG tablet    NORVASC    90 tablet    Take 1 tablet (5 mg) by mouth daily       blood glucose lancets standard    no brand specified    1    needs lancet        blood glucose monitoring test strip    no brand specified    100 each    100 strips by In Vitro route daily Use to test blood sugar 1 times daily  And as needed ACCU Check Haylee Plus Test Strips       chlorthalidone 25 MG tablet    HYGROTON    30 tablet    Take 0.5 tablets (12.5 mg) by mouth daily       Clozapine 200 MG tablet    CLOZARIL     Take 200 mg by mouth 2 times daily       KENNY VINYL GLOVES Misc     1 box , medium    Staff to use for applicatin of acne med.       FLUoxetine 20 MG capsule    PROzac     Take 20 mg by mouth daily       fluticasone 50 MCG/ACT spray    FLONASE    1 Bottle    Spray 2 sprays into both nostrils daily       glipiZIDE 5 MG tablet    GLUCOTROL    60 tablet    Take 1 tablet (5 mg) by mouth 2 times daily (before meals)       lithium 300 MG tablet      Take 300 mg by mouth every evening       loratadine 10 MG tablet    CLARITIN    90 tablet    Take 1 tablet (10 mg) by mouth every morning       montelukast 10 MG tablet    SINGULAIR    90 tablet    Take 1 tablet (10 mg) by mouth At Bedtime       naltrexone 50 MG tablet    DEPADE;REVIA     Take 50 mg by mouth every morning       omeprazole 20 MG CR capsule    priLOSEC    60 capsule    1 capsule bid       PREVIDENT 5000 BOOSTER 1.1 % Pste   Generic drug:   Sodium Fluoride      Brush Teeth every morning and evening       PULMICORT FLEXHALER 90 MCG/ACT Aepb   Generic drug:  BUDESONIDE (INHALATION)      Inhale 1 puff into the lungs 2 times daily       ranitidine 300 MG tablet    ZANTAC    90 tablet    Take 1 tablet (300 mg) by mouth At Bedtime       traZODone 100 MG tablet    DESYREL    90 tablet    Take 1 tablet (100 mg) by mouth At Bedtime       Vitamin D (Cholecalciferol) 1000 UNITS Tabs     90 tablet    Take 1 tablet by mouth daily       warfarin 2 MG tablet    COUMADIN    30 tablet    As directed by Anticoagulation Clinic, currently establishing a maintenance dose

## 2017-05-01 NOTE — PATIENT INSTRUCTIONS
"DIABETES AND YOUR FEET    Diabetes can result in several problems in the feet including ulcers (open sores) and amputations. Two of the most important reasons why people develop foot problems when they have diabetes is : 1. Neuropathy (loss of feeling)  2. Vascular disease (loss or decrease of blood flow).    Neuropathy is a term used to describe a loss of nerve function.  Patients with diabetes are at risk of developing neuropathy if their sugars continue to run high and are above the normal value. One theory for neuropathy is that the \"extra\" sugar in the body enters the nerves and is broken down. These by-products build up in the nerve causing it to swell and impairing nerve function. Often times, this can be prevented by controlling your sugars, dieting and exercise.    When a person develops neuropathy, they usually begin to feel numbness or tingling in their feet and sometime in their legs.  Other symptoms may include painful burning or hot feet, tingling or feeling like insects or ants are crawling on your feet or legs.  If the diabetes is sever and the sugars run high for long periods of time, neuropathy can also occur in the hands.    Vascular disease is a term used to describe a loss or decrease in circulation (blood flow). There is a problem in getting blood and oxygen to areas that need it. Similar to neuropathy, sugars can build up in the walls of the arteries (blood vessels) and cause them to become swollen, thickened and hardened. This decreases the amount of blood that can go to an area that needs it. Though this is common in the legs of diabetic patients, it can also affect other arteries (blood vessels) in the body such as in the heart and eyes.    In the legs, vascular disease usually results in cramping. Patients who develop leg cramps after walking the same distance every time (i.e. One block, half a mile, ect.) need to let their doctors know so that their circulation may be checked. Cramps " causing severe pain in the feet and/or legs while sleeping and the cramps go away when you stand or hang your legs off the side of the bed, may also be a sign of poor blood circulation.  Occasional cramping in cold weather or on rare occasions with activity may not be due to poor circulation, but you should inform your doctor.    PREVENTION OF THESE DISEASES  The key to prevention is good blood sugar control. Poor blood sugar control is a big reason many of these problems start. Physical activity (exercise) is a very good way to help decrease your blood sugars. Exercise can lower your blood sugar, blood pressure, and cholesterol. It also reduces your risk for heart disease and stroke, relieves stress, and strengthens your heart, muscles and bones.  In addition, regular activity helps insulin work better, improves your blood circulation, and keeps your joints flexible. If you're trying to lose weight, a combination of exercise and wise food choices can help you reach your target weight and maintain it.      PAIN MANAGEMENT  1.Blood Sugar Control - Most important  2. Medications such as:  Amytriptylline, duloxetine, gabapentin, lyrica, tramadol  3. Nutritional therapy:  Vitamin B6 (100mg daily), Vitamin B12 (75mcg daily), Vitamin D 2000 IU daily), Alpha-Lipoic Acid (600-1800mg daily), Acetyl-L-Carnitine (500-1000mg TID, L-methyl folate (1500mcg daily)    ** Metformin can block Vitamin B6 and B12 so it is important to supplement**    FOOT CARE RECOMMENDATIONS   1. Wash your feet with lukewarm water and a mild soap and then dry them thoroughly, especially between the toes.     2. Examine your feet daily looking for cuts, corns, blisters, cracks, ect, especially after wearing new shoes. Make sure to look between your toes. If you cannot see the bottom of your feet, set a mirror on the floor and hold your foot over it, or ask a spouse, friend or family member to examine your feet for you. Contact your doctor immediately  if new problems are noted or if sores are not healing.     3. Immediately apply moisturizer to the tops and bottoms of your feet, avoiding areas between the toes. Hand lotion (Intesive Care, Joanna, Eucerin, Neutrogena, Curel, ect) is sufficient unless your doctor prescribes a medicated lotion. Apply sunscreen to your feet when going swimming outside.     4. Use clean comfortable shoes, wear white socks (if you have any bleeding or drainage, you will see it on white socks). Socks should not have thick seams or cut off the circulation around the leg. Break in new shoes slowly and rotate with older shoes until broken in. Check the inside of your shoes with your hand to look for areas of irritation or objects that may have fallen into your shoes.       5. Keep slippers by the side of your bed for use during the night.     6.  Shoes should be fitted by a professional and should not cause areas of irritation.  Check your feet regularly when wearing a new pair of shoes and replace them as needed.     7.  Talk to your doctor about proper exercise. Exercise and stretching stimulate blood flow to your feet and maintain proper glucose levels.     8.  Monitor your blood glucose level as instructed by your doctor. Notify your doctor immediately if your blood sugar is abnormally high or low.    9. Cut your nails straight across, but then gently round any sharp edges with a cardboard nail file. If you have neuropathy, peripheral vascular disease or cannot see that well to trim your own toenails contact [facility] ([phone]) or [facility] ([phone]).      THINGS TO AVOID DOING   1.  Do not soak your feet if you have an open sore. Use only lukewarm water and always check the temperature with your hand as hot water can easily burn your feet.       2.  Never use a hot water bottle or heating pad on your feet. Also do not apply cold compresses to your feet. With decreased sensation, you could burn or freeze your feet.        3.  Do not apply any of these to your feet:    -  Over the counter medicine for corns or warts    -  Harsh chemicals like boric acid    -  Do not self-treat corns, cuts, blisters or infections. Always consult your doctor.       4.  Do not wear sandals, slippers or walk barefoot, especially on hot sand or concrete or other harsh surfaces.     5.  If you smoke, stop!!!

## 2017-05-01 NOTE — LETTER
5/1/2017       RE: Divina Delgadillo  [id number] [address] [place] [id number]           Dear Colleague,    Thank you for referring your patient, Divina Delgadillo, to the [facility]. Please see a copy of my visit note below.    PATIENT HISTORY:  Divina Delgadillo is a 32 year old female who presents to clinic for a diabetic foot evaluation.  The patient relates pain with ambulation in shoe gear.  The patient relates that the nails are difficult to trim at home.  The patient relates blood sugars are within normal limits.  The patient denies any redness, swelling or open sores on both feet.       REVIEW OF SYSTEMS:  Constitutional, HEENT, cardiovascular, pulmonary, GI, , musculoskeletal, neuro, skin, endocrine and psych systems are negative, except as otherwise noted.     PAST MEDICAL HISTORY:   Past Medical History:   Diagnosis Date     Depressive disorder, not elsewhere classified      DVT (deep venous thrombosis) (H)      Dysmetabolic syndrome X      Esophageal reflux     GERD     Mild intermittent asthma      Other abnormal heart sounds     Heart murmur     Other specified types of schizophrenia, unspecified condition      Pancreatic cancer (H)     left adrenal gland, partial pancreas, and spleen removed; no chemo or radiation.      Type II or unspecified type diabetes mellitus without mention of complication, not stated as uncontrolled      Unspecified disorder of tympanic membrane         PAST SURGICAL HISTORY:   Past Surgical History:   Procedure Laterality Date     ADRENALECTOMY       PANCREATECTOMY PARTIAL       SPLENECTOMY       SURGICAL HISTORY OF -   10/1/2000    Tympanoplasty     SURGICAL HISTORY OF -   10/1/2000    Tonsillectomy        MEDICATIONS:   Current Outpatient Prescriptions:      warfarin (COUMADIN) 2 MG tablet, As directed by Anticoagulation Clinic, currently establishing a maintenance dose, Disp: 30 tablet, Rfl: 1     chlorthalidone (HYGROTON) 25 MG  tablet, Take 0.5 tablets (12.5 mg) by mouth daily, Disp: 30 tablet, Rfl: 1     blood glucose monitoring (NO BRAND SPECIFIED) test strip, 100 strips by In Vitro route daily Use to test blood sugar 1 times daily  And as needed ACCU Check Haylee Plus Test Strips, Disp: 100 each, Rfl: 1     Vitamin D, Cholecalciferol, 1000 UNITS TABS, Take 1 tablet by mouth daily, Disp: 90 tablet, Rfl: 3     fluticasone (FLONASE) 50 MCG/ACT spray, Spray 2 sprays into both nostrils daily, Disp: 1 Bottle, Rfl: 1     amLODIPine (NORVASC) 5 MG tablet, Take 1 tablet (5 mg) by mouth daily, Disp: 90 tablet, Rfl: 1     montelukast (SINGULAIR) 10 MG tablet, Take 1 tablet (10 mg) by mouth At Bedtime, Disp: 90 tablet, Rfl: 1     traZODone (DESYREL) 100 MG tablet, Take 1 tablet (100 mg) by mouth At Bedtime, Disp: 90 tablet, Rfl: 1     ranitidine (ZANTAC) 300 MG tablet, Take 1 tablet (300 mg) by mouth At Bedtime, Disp: 90 tablet, Rfl: 1     loratadine (CLARITIN) 10 MG tablet, Take 1 tablet (10 mg) by mouth every morning, Disp: 90 tablet, Rfl: 1     glipiZIDE (GLUCOTROL) 5 MG tablet, Take 1 tablet (5 mg) by mouth 2 times daily (before meals), Disp: 60 tablet, Rfl: 2     omeprazole (PRILOSEC) 20 MG CR capsule, 1 capsule bid, Disp: 60 capsule, Rfl: 5     Clozapine (CLOZARIL) 200 MG tablet, Take 200 mg by mouth 2 times daily, Disp: , Rfl:      ARIPiprazole (ABILIFY) 30 MG tablet, Take 30 mg by mouth daily, Disp: , Rfl:      FLUoxetine (PROZAC) 20 MG capsule, Take 20 mg by mouth daily, Disp: , Rfl:      BUDESONIDE, INHALATION, (PULMICORT FLEXHALER) 90 MCG/ACT AEPB, Inhale 1 puff into the lungs 2 times daily, Disp: , Rfl:      Sodium Fluoride (PREVIDENT 5000 BOOSTER) 1.1 % PSTE, Brush Teeth every morning and evening, Disp: , Rfl:      lithium 300 MG tablet, Take 300 mg by mouth every evening, Disp: , Rfl:      naltrexone (DEPADE;REVIA) 50 MG tablet, Take 50 mg by mouth every morning, Disp: , Rfl:      LANCETS MISC. KIT, needs lancet  , Disp:  "1, Rfl: 0     KENNY VINYL GLOVES MISC, Staff to use for applicatin of acne med. , Disp: 1 box , medium, Rfl: 6     ALLERGIES:    Allergies   Allergen Reactions     Latex      Tylenol [Tylenol]         SOCIAL HISTORY:   Social History     Social History     Marital status: Single     Spouse name: N/A     Number of children: 0     Years of education: N/A     Occupational History     Not on file.     Social History Main Topics     Smoking status: Current Every Day Smoker     Packs/day: 1.00     Years: 4.00     Types: Cigarettes     Last attempt to quit: 6/20/2008     Smokeless tobacco: Not on file      Comment: 1 packe every other day      Alcohol use No     Drug use: No     Sexual activity: Not Currently     Partners: Male     Other Topics Concern     Not on file     Social History Narrative        FAMILY HISTORY:   Family History   Problem Relation Age of Onset     Respiratory Mother      ASTHMA     Allergies Mother      Depression Mother      HEART DISEASE Father      HEART VALVE REPLACEMENT     Hypertension Father      DIABETES Father      Depression Father      Respiratory Brother      Allergies Brother      Respiratory Sister      Allergies Sister      Depression Sister      Respiratory Sister      Allergies Sister      Depression Sister      KIDNEY DISEASE No family hx of         EXAM:Vitals: Ht 1.638 m (5' 4.5\")  Wt 85.3 kg (188 lb)  BMI 31.77 kg/m2  BMI= Body mass index is 31.77 kg/(m^2).    Weight management plan: Patient was referred to their PCP to discuss a diet and exercise plan.    General appearance: Patient is alert and fully cooperative with history & exam.  No sign of distress is noted during the visit.     Psychiatric: Affect is pleasant & appropriate.  Patient appears motivated to improve health.     Respiratory: Breathing is regular & unlabored while sitting.     HEENT: Hearing is intact to spoken word.  Speech is clear.  No gross evidence of visual impairment that would impact ambulation.   "   Dermatologic: Skin is intact to both lower extremities without significant lesions, rash or abrasion.  No paronychia or evidence of soft tissue infection is noted.  The nails are hypertrophic and discolored.  Noted hyperkeratotic skin lesion on the plantar aspect of both feet. No surrounding erythema. No subdermal hemorrhage noted.        Vascular: DP & PT pulses are intact & regular bilaterally.  No significant edema or varicosities noted.  CFT and skin temperature is normal to both lower extremities.  There are no varicosities, no edema and no trophic changes noted.      Neurologic: Lower extremity sensation is intact to light touch.  No evidence of weakness or contracture in the lower extremities.  No evidence of neuropathy.  Sensorium appears diminished to Semms Mary Monofilament test bilaterally.       Musculoskeletal: Patient is ambulatory without assistive device or brace.  No gross ankle deformity noted.  No foot or ankle joint effusion is noted.  One notes a hammertoe contracture of the second digit bilaterally.       Assessment:  1.  Diabetes Mellitus and Peripheral Neuropathy.    2.  Hammertoe deformity.    3.  Callus ×2      Plan:  I have explained to the patient the condition.  I have discussed with the patient the importance of diabetic foot care.  The calluses were sharply debrided with a #10 blade down to healthy epidermis with no bleeding noted.  The patient was referred to [facility] for custom diabetic shoes with accommodative inserts that will aid in offloading the tension forces to the soft tissues and prevent future risk of amputation.           ERNA Douglas D.P.M., F.ANGELES.C.F.A.S.          Again, thank you for allowing me to participate in the care of your patient.        Sincerely,              Raul Douglas DPM

## 2017-05-02 ENCOUNTER — ANTICOAGULATION THERAPY VISIT (OUTPATIENT)
Dept: ANTICOAGULATION | Facility: CLINIC | Age: 32
End: 2017-05-02
Payer: MEDICARE

## 2017-05-02 DIAGNOSIS — I82.409 DVT (DEEP VENOUS THROMBOSIS) (H): ICD-10-CM

## 2017-05-02 LAB — INR POINT OF CARE: 3.3 (ref 0.86–1.14)

## 2017-05-02 PROCEDURE — 99207 ZZC NO CHARGE NURSE ONLY: CPT

## 2017-05-02 PROCEDURE — 85610 PROTHROMBIN TIME: CPT | Mod: QW

## 2017-05-02 PROCEDURE — 36416 COLLJ CAPILLARY BLOOD SPEC: CPT

## 2017-05-02 NOTE — MR AVS SNAPSHOT
Divina Delgadillo   5/2/2017 2:45 PM   Anticoagulation Therapy Visit    Description:  32 year old female   Provider:  [facility]   Department:  Cl Anticoag           INR as of 5/2/2017     Today's INR 3.3!      Anticoagulation Summary as of 5/2/2017     INR goal 2.0-3.0   Today's INR 3.3!   Full instructions 4 mg every day   Next INR check 5/23/2017    Indications   DVT (deep venous thrombosis) (H) [I82.409]         Description     Recheck INR 3 weeks, 5/23/17  Take warfarin 4 mg daily        Your next Anticoagulation Clinic appointment(s)     May 23, 2017  2:00 PM CDT   [facility]agulation [facility] with [facility]   [facility]    [address] [address] [address] [address] [phone]              Contact Numbers     Please call [phone] to cancel and/or reschedule your appointment.  Please call [phone] with any problems or questions regarding your therapy          May 2017 Details    Sun Mon Tue Wed Thu Fri Sat      1               2      4 mg   See details      3      4 mg         4      4 mg         5      4 mg         6      4 mg           7      4 mg         8      4 mg         9      4 mg         10      4 mg         11      4 mg         12      4 mg         13      4 mg           14      4 mg         15      4 mg         16      4 mg         17      4 mg         18      4 mg         19      4 mg         20      4 mg           21      4 mg         22      4 mg         23            24               25               26               27                 28               29               30               31                   Date Details   05/02 This INR check       Date of next INR:  5/23/2017         How to take your warfarin dose     To take:  4 mg Take 2 of the 2 mg tablets.

## 2017-05-02 NOTE — PROGRESS NOTES
ANTICOAGULATION FOLLOW-UP CLINIC VISIT    Patient Name:  Divina Delgadillo  Date:  5/2/2017  Contact Type:  Face to Face    SUBJECTIVE:     Patient Findings     Positives Change in diet/appetite (pt willhave a serving of Vit K foods today), No Problem Findings (no concerns reported)           OBJECTIVE    INR Protime   Date Value Ref Range Status   05/02/2017 3.3 (A) 0.86 - 1.14 Final       ASSESSMENT / PLAN  INR assessment SUPRA    Recheck INR In: 3 WEEKS    INR Location Clinic      Anticoagulation Summary as of 5/2/2017     INR goal 2.0-3.0   Today's INR 3.3!   Maintenance plan 4 mg (2 mg x 2) every day   Full instructions 4 mg every day   Weekly total 28 mg   Plan last modified Myrna Wiseman RN (5/2/2017)   Next INR check 5/23/2017   Priority INR   Target end date 6/27/2017    Indications   DVT (deep venous thrombosis) (H) [I82.409]         Anticoagulation Episode Summary     INR check location     Preferred lab     Send INR reminders to  ANTICOAG POOL    Comments * Transfer of care, moved to new group home      Anticoagulation Care Providers     Provider Role Specialty Phone number    Jaleesa Velasquez, VERONIQUE CNP Responsible Nurse Practitioner - Gerontology [phone]            See the Encounter Report to view Anticoagulation Flowsheet and Dosing Calendar (Go to Encounters tab in chart review, and find the Anticoagulation Therapy Visit)        Myrna Wiseman, RN

## 2017-05-09 ENCOUNTER — TELEPHONE (OUTPATIENT)
Dept: FAMILY MEDICINE | Facility: CLINIC | Age: 32
End: 2017-05-09

## 2017-05-10 ENCOUNTER — CARE COORDINATION (OUTPATIENT)
Dept: CARE COORDINATION | Facility: CLINIC | Age: 32
End: 2017-05-10

## 2017-05-10 NOTE — PROGRESS NOTES
Clinic Care Coordination Contact  Care Team Conversations    RN CC received a call back from [facility]. They do need a form signed by an MD or  for Medicare. This form has been faxed to PCP. RN CC can assist to ensure this for gets completed and returned back to [facility] if needed.     RN CC left a VM for Lorin to update her. RN CC will plan to f/u with Lorin again in 2-3 weeks.     Sobeida Greer RN, BSN, PHN   Clinic Care Coordinator  [facility]:  [email or web address]   Office: [phone] Fax: [phone]

## 2017-05-10 NOTE — PROGRESS NOTES
Clinic Care Coordination Contact  Care Team Conversations    RN CC called to f/u with Lorin pt's caregiver. She reports things have overall been going well with Divina. They were recently in to see podiatry and pt was ordered diabetic shoes. There is some confusion around the order, though pt has been fitted and shoes have been ordered per Lorin. HALLIE WEBBER left a VM for [facility] to f/u on this.   HALLIE WEBBER will call Lorin back with an update when RN CC receives a return call from [facility].     Sobeida Greer RN, BSN, PHN   Clinic Care Coordinator  [facility]:  [email or web address]   Office: [phone] Fax: [phone]

## 2017-05-10 NOTE — TELEPHONE ENCOUNTER
[facility] - diabetic shoe order form completed and routed to Dr. Rodriguez (needs MD/DO signature).

## 2017-05-11 ENCOUNTER — MEDICAL CORRESPONDENCE (OUTPATIENT)
Dept: HEALTH INFORMATION MANAGEMENT | Facility: CLINIC | Age: 32
End: 2017-05-11

## 2017-05-16 ENCOUNTER — TELEPHONE (OUTPATIENT)
Dept: FAMILY MEDICINE | Facility: CLINIC | Age: 32
End: 2017-05-16

## 2017-05-17 ENCOUNTER — OFFICE VISIT (OUTPATIENT)
Dept: FAMILY MEDICINE | Facility: CLINIC | Age: 32
End: 2017-05-17
Payer: MEDICARE

## 2017-05-17 VITALS
HEIGHT: 65 IN | DIASTOLIC BLOOD PRESSURE: 68 MMHG | HEART RATE: 88 BPM | WEIGHT: 186.5 LBS | SYSTOLIC BLOOD PRESSURE: 130 MMHG | BODY MASS INDEX: 31.07 KG/M2 | TEMPERATURE: 98.1 F

## 2017-05-17 DIAGNOSIS — R53.83 OTHER FATIGUE: ICD-10-CM

## 2017-05-17 DIAGNOSIS — R42 DIZZINESS: ICD-10-CM

## 2017-05-17 DIAGNOSIS — M94.0 COSTOCHONDRITIS: ICD-10-CM

## 2017-05-17 DIAGNOSIS — N18.30 TYPE 2 DIABETES MELLITUS WITH STAGE 3 CHRONIC KIDNEY DISEASE, WITHOUT LONG-TERM CURRENT USE OF INSULIN (H): ICD-10-CM

## 2017-05-17 DIAGNOSIS — R94.5 ABNORMAL RESULTS OF LIVER FUNCTION STUDIES: ICD-10-CM

## 2017-05-17 DIAGNOSIS — E11.22 TYPE 2 DIABETES MELLITUS WITH STAGE 3 CHRONIC KIDNEY DISEASE, WITHOUT LONG-TERM CURRENT USE OF INSULIN (H): ICD-10-CM

## 2017-05-17 DIAGNOSIS — R07.82 INTERCOSTAL PAIN: Primary | ICD-10-CM

## 2017-05-17 LAB
ALBUMIN SERPL-MCNC: 3.3 G/DL (ref 3.4–5)
ALP SERPL-CCNC: 231 U/L (ref 40–150)
ALT SERPL W P-5'-P-CCNC: 76 U/L (ref 0–50)
ANION GAP SERPL CALCULATED.3IONS-SCNC: 9 MMOL/L (ref 3–14)
AST SERPL W P-5'-P-CCNC: 50 U/L (ref 0–45)
BILIRUB DIRECT SERPL-MCNC: <0.1 MG/DL (ref 0–0.2)
BILIRUB SERPL-MCNC: 0.4 MG/DL (ref 0.2–1.3)
BUN SERPL-MCNC: 24 MG/DL (ref 7–30)
CALCIUM SERPL-MCNC: 9.6 MG/DL (ref 8.5–10.1)
CHLORIDE SERPL-SCNC: 105 MMOL/L (ref 94–109)
CO2 SERPL-SCNC: 25 MMOL/L (ref 20–32)
CREAT SERPL-MCNC: 1.47 MG/DL (ref 0.52–1.04)
CRP SERPL-MCNC: 6.4 MG/L (ref 0–8)
GFR SERPL CREATININE-BSD FRML MDRD: 41 ML/MIN/1.7M2
GLUCOSE SERPL-MCNC: 92 MG/DL (ref 70–99)
HGB BLD-MCNC: 11.5 G/DL (ref 11.7–15.7)
POTASSIUM SERPL-SCNC: 4 MMOL/L (ref 3.4–5.3)
PROT SERPL-MCNC: 8.3 G/DL (ref 6.8–8.8)
SODIUM SERPL-SCNC: 139 MMOL/L (ref 133–144)
TSH SERPL DL<=0.005 MIU/L-ACNC: 1.07 MU/L (ref 0.4–4)

## 2017-05-17 PROCEDURE — 80048 BASIC METABOLIC PNL TOTAL CA: CPT | Performed by: NURSE PRACTITIONER

## 2017-05-17 PROCEDURE — 99214 OFFICE O/P EST MOD 30 MIN: CPT | Performed by: NURSE PRACTITIONER

## 2017-05-17 PROCEDURE — 80076 HEPATIC FUNCTION PANEL: CPT | Performed by: NURSE PRACTITIONER

## 2017-05-17 PROCEDURE — 86140 C-REACTIVE PROTEIN: CPT | Performed by: NURSE PRACTITIONER

## 2017-05-17 PROCEDURE — 86038 ANTINUCLEAR ANTIBODIES: CPT | Performed by: NURSE PRACTITIONER

## 2017-05-17 PROCEDURE — 84443 ASSAY THYROID STIM HORMONE: CPT | Performed by: NURSE PRACTITIONER

## 2017-05-17 PROCEDURE — 85018 HEMOGLOBIN: CPT | Performed by: NURSE PRACTITIONER

## 2017-05-17 PROCEDURE — 93000 ELECTROCARDIOGRAM COMPLETE: CPT | Performed by: NURSE PRACTITIONER

## 2017-05-17 PROCEDURE — 36415 COLL VENOUS BLD VENIPUNCTURE: CPT | Performed by: NURSE PRACTITIONER

## 2017-05-17 RX ORDER — GLIPIZIDE 5 MG/1
TABLET ORAL
Qty: 45 TABLET | Refills: 1 | Status: SHIPPED | OUTPATIENT
Start: 2017-05-17 | End: 2017-06-16

## 2017-05-17 RX ORDER — TIZANIDINE HYDROCHLORIDE 2 MG/1
2 CAPSULE, GELATIN COATED ORAL 3 TIMES DAILY PRN
Qty: 30 CAPSULE | Refills: 0 | Status: SHIPPED | OUTPATIENT
Start: 2017-05-17 | End: 2017-06-07

## 2017-05-17 NOTE — PROGRESS NOTES
SUBJECTIVE:                                                    Divina Delgadillo is a 32 year old female who presents to clinic today for the following health issues: fatigue, having on and off sharp pains in her sternum, pain worse with movements and when pressure applied. Also complains of dizziness, chills and mild shortness of breath. Symptoms started 2 days ago.  Denies palpitations, nausea, vomiting.  Complains of diaphoresis. Reports that her blood sugars have been running high, states usually 150-160 in the mornings before she eats.   Reports that her psychiatrist Patrica Rivero did stopped Lithium. When I asked patient about her other psychiatric medications, specifically Revia, the patient was not aware if she is still taking it. 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 She will sign release of information forms so we can get records from her psychiatrist.        CHEST PAIN     Onset: 2 days     Description:   Location:  left side  Character: sharp    Progression of Symptoms:  same    Accompanying Signs & Symptoms:  Shortness of breath: YES  Sweating: YES  Nausea/vomiting: no  Lightheadedness: YES  Palpitations: no  Fever/Chills: YES Chills  Cough: no  Heartburn: no    History:   Family history of heart disease YES  Tobacco use: YES    Precipitating factors:   Worse with exertion: no  Worse with deep breaths :  YES  Related to food: no    Alleviating factors:  None       Therapies Tried and outcome: Nothing     Problem list and histories reviewed & adjusted, as indicated.  Additional history: as documented    Labs reviewed in EPIC    Reviewed and updated as needed this visit by clinical staff  Tobacco  Allergies  Med Hx  Surg Hx  Fam Hx  Soc Hx      Reviewed and updated as needed this visit by Provider         ROS:  Constitutional, HEENT, cardiovascular, pulmonary, gi and gu systems are negative, except as otherwise noted.    OBJECTIVE:   "                                                  /68  Pulse 88  Temp 98.1  F (36.7  C) (Tympanic)  Ht 5' 4.5\" (1.638 m)  Wt 186 lb 8 oz (84.6 kg)  BMI 31.52 kg/m2  Body mass index is 31.52 kg/(m^2).  GENERAL: healthy, alert and no distress  NECK: no adenopathy, no asymmetry, masses, or scars and thyroid normal to palpation  RESP: lungs clear to auscultation - no rales, rhonchi or wheezes  CV: regular rate and rhythm, normal S1 S2, no S3 or S4, no murmur, click or rub, no peripheral edema and peripheral pulses strong  ABDOMEN: soft, nontender, no hepatosplenomegaly, no masses and bowel sounds normal  MS: tenderness to palpation bilateral sternum, chest wall pain worse with left arm abduction and elevation.   SKIN: no suspicious lesions or rashes  PSYCH: mentation appears normal, affect normal/bright       ASSESSMENT/PLAN:                                                      BMI:   Estimated body mass index is 31.52 kg/(m^2) as calculated from the following:    Height as of this encounter: 5' 4.5\" (1.638 m).    Weight as of this encounter: 186 lb 8 oz (84.6 kg).   Weight management plan: Discussed healthy diet and exercise guidelines and patient will follow up in 3 months in clinic to re-evaluate.      1. Intercostal pain  -normal inflammatory markers, pending DONA  - Antinuclear antibody screen by EIA  - CRP, inflammation  -pain is likely due to costochondritis   -recommended muscle relaxant, Capsaicin cream as needed     2. Dizziness  -EKG normal, hemoglobin level slightly low, but stable,  thyroid function within normal limits   -renal function stable, recommended increase oral fluid intake, hold Chlorthalidone for 2 days, if still feeling lightheaded will recommend to stop Hygroton, her BP have been normal.    - EKG 12-lead complete w/read - Clinics  - Basic metabolic panel  - Hemoglobin    3. Abnormal results of liver function studies  - Hepatic panel-still elevated alk phosphatase and mildly elevated " AST and ALT. Recent liver US did not show biliary dilation, no gallstones and no evidence of cholecystitis   The liver is heterogeneous and lobulated in configuration.  No gallstones or gallbladder wall thickening are demonstrated. There  is no obvious intrahepatic biliary dilatation. The common bile duct  cannot be visualized. The head and a small portion of the body of the  pancreas are within normal limits. The rest of the body and tail of  the pancreas are not visualized. A spleen is not seen and consistent  with the history of surgical removal. The kidneys are within normal  Limits.  -Thyroid function normal   - Antinuclear antibody screen by EIA-pending, ordered to check for possible autoimmune hepatitis   - GASTROENTEROLOGY ADULT REF CONSULT ONLY    4. Type 2 diabetes mellitus with stage 3 chronic kidney disease, without long-term current use of insulin (H)  -reports elevated fasting glucose levels at home, states over 150'  -will increase Glipizide to 10 mg AM and continue 5 mg PM  -monitor blood sugars   - glipiZIDE (GLUCOTROL) 5 MG tablet; Take 2 tablets AM and 1 tablet PM before meals  Dispense: 45 tablet; Refill: 1    5. Costochondritis  - TiZANidine HCl 2 MG CAPS; Take 2 mg by mouth 3 times daily as needed  Dispense: 30 capsule; Refill: 0  -Capsaicin cream every 6 hrs as needed     6. Other fatigue  -can be due to abnormal liver function, chronic renal disease   - TSH with free T4 reflex-normal  -DONA-pending  -will consult with liver clinic regarding elevated LFT's       See Patient Instructions    VERONIQUE Spears [facility]

## 2017-05-17 NOTE — MR AVS SNAPSHOT
After Visit Summary   5/17/2017    Divina Delgadillo    MRN: [id number]           Patient Information     Date Of Birth          1985        Visit Information        Provider Department      5/17/2017 3:00 PM Jaleesa Velasquez APRN CNP [facility]        Today's Diagnoses     Intercostal pain    -  1    Dizziness        Abnormal results of liver function studies        Type 2 diabetes mellitus with stage 3 chronic kidney disease, without long-term current use of insulin (H)        Costochondritis        Other fatigue        Fatigue, unspecified type          Care Instructions    Labs: kidney function, electrolytes, random glucose, hemoglobin, thyroid and repeat liver enzymes.  If liver function still abnormal will need to follow up with GI     For diabetes increase Glipizide to 2 tablets AM and 1 tablet PM with meals       Chest pains are due costochondritis    For pain try Tizanidine 1 capsule 3 times daily as needed  Apply Capsaicin cream as needed every 6 hours                 Follow-ups after your visit        Your next 10 appointments already scheduled     May 23, 2017  2:00 PM CDT   [facility] with [facility]   [facility]    [address] [facility] [address]   [phone]            Jun 27, 2017 10:00 AM CDT   LAB with LAB FIRST FLOOR [facility] ([facility])    [id number] [place] [address] [phone]           Patient must bring picture ID.  Patient should be prepared to give a urine specimen  Please do not eat 10-12 hours before your appointment if you are coming in fasting for labs on lipids, cholesterol, or glucose (sugar).  Pregnant women should follow their Care Team instructions. Water with medications is okay. Do not drink coffee or other fluids.   If you have concerns about taking  your medications, please ask at office or if  "scheduling via [employer], send a message by clicking on Secure Messaging, Message Your Care Team.            Jul 10, 2017  2:00 PM CDT   Return Visit with Sánchez Dias MD   [facility]    [address] [place] [id number] [phone]              Who to contact     If you have questions or need follow up information about today's clinic visit or your schedule please contact [facility] directly at [phone].  Normal or non-critical lab and imaging results will be communicated to you by [employer], letter or phone within 4 business days after the clinic has received the results. If you do not hear from us within 7 days, please contact the clinic through [employer] or phone. If you have a critical or abnormal lab result, we will notify you by phone as soon as possible.  Submit refill requests through [employer] or call your pharmacy and they will forward the refill request to us. Please allow 3 business days for your refill to be completed.          Additional Information About Your Visit        [employer] Information     [employer] lets you send messages to your doctor, view your test results, renew your prescriptions, schedule appointments and more. To sign up, go to [email or web address] . Click on \"Log in\" on the left side of the screen, which will take you to the Welcome page. Then click on \"Sign up Now\" on the right side of the page.     You will be asked to enter the access code listed below, as well as some personal information. Please follow the directions to create your username and password.     Your access code is: [email or web address]  Expires: 2017 12:05 PM     Your access code will  in 90 days. If you need help or a new code, please call your [facility] or [phone].        Care EveryWhere ID     This is your Care EveryWhere ID. This could be used by other organizations to access your [place] medical " "records  [id number]        Your Vitals Were     Pulse Temperature Height BMI (Body Mass Index)          88 98.1  F (36.7  C) (Tympanic) 5' 4.5\" (1.638 m) 31.52 kg/m2         Blood Pressure from Last 3 Encounters:   05/17/17 130/68   04/13/17 137/43   03/27/17 130/62    Weight from Last 3 Encounters:   05/17/17 186 lb 8 oz (84.6 kg)   05/01/17 188 lb (85.3 kg)   04/13/17 188 lb 3.2 oz (85.4 kg)              We Performed the Following     Basic metabolic panel     EKG 12-lead complete w/read - Clinics     Hemoglobin     Hepatic panel     TSH with free T4 reflex          Today's Medication Changes          These changes are accurate as of: 5/17/17  4:03 PM.  If you have any questions, ask your nurse or doctor.               Start taking these medicines.        Dose/Directions    TiZANidine HCl 2 MG Caps   Used for:  Costochondritis   Started by:  Jaleesa Velasquez APRN CNP        Dose:  2 mg   Take 2 mg by mouth 3 times daily as needed   Quantity:  30 capsule   Refills:  0         These medicines have changed or have updated prescriptions.        Dose/Directions    glipiZIDE 5 MG tablet   Commonly known as:  GLUCOTROL   This may have changed:    - how much to take  - how to take this  - when to take this  - additional instructions   Used for:  Type 2 diabetes mellitus with stage 3 chronic kidney disease, without long-term current use of insulin (H)   Changed by:  Jaleesa Velasquez APRN CNP        Take 2 tablets AM and 1 tablet PM before meals   Quantity:  45 tablet   Refills:  1            Where to get your medicines      [facility] [address]  [address], [place] [address]    Hours:  [facility] [place] [employer] [employer] Phone:  [phone]     glipiZIDE 5 MG tablet    TiZANidine HCl 2 MG Caps                Primary Care Provider Office Phone # Fax #    VERONIQUE Bledsoe [phone]       [place] [facility] [address] [facility] [address]        Thank you!     Thank you for choosing [facility]  for your care. Our goal is always to provide you with excellent care. Hearing back from our patients is one way we can continue to improve our services. Please take a few minutes to complete the written survey that you may receive in the mail after your visit with us. Thank you!             Your Updated Medication List - Protect others around you: Learn how to safely use, store and throw away your medicines at www.disposemymeds.org.          This list is accurate as of: 5/17/17  4:03 PM.  Always use your most recent med list.                   Brand Name Dispense Instructions for use    ABILIFY 30 MG tablet   Generic drug:  ARIPiprazole      Take 30 mg by mouth daily       amLODIPine 5 MG tablet    NORVASC    90 tablet    Take 1 tablet (5 mg) by mouth daily       blood glucose lancets standard    no brand specified    1    needs lancet        blood glucose monitoring test strip    no brand specified    100 each    100 strips by In Vitro route daily Use to test blood sugar 1 times daily  And as needed ACCU Check Haylee Plus Test Strips       chlorthalidone 25 MG tablet    HYGROTON    30 tablet    Take 0.5 tablets (12.5 mg) by mouth daily       Clozapine 200 MG tablet    CLOZARIL     Take 200 mg by mouth 2 times daily       FLUoxetine 20 MG capsule    PROzac     Take 20 mg by mouth daily       fluticasone 50 MCG/ACT spray    FLONASE    1 Bottle    Spray 2 sprays into both nostrils daily       glipiZIDE 5 MG tablet    GLUCOTROL    45 tablet    Take 2 tablets AM and 1 tablet PM before meals       lithium 300 MG tablet      Take 300 mg by mouth every evening Reported on 5/17/2017       loratadine 10 MG tablet    CLARITIN    90 tablet    Take 1 tablet (10 mg) by mouth every morning       montelukast 10 MG tablet    SINGULAIR    90 tablet    Take 1 tablet (10 mg) by mouth At Bedtime        naltrexone 50 MG tablet    DEPADE;REVIA     Take 50 mg by mouth every morning       omeprazole 20 MG CR capsule    priLOSEC    60 capsule    1 capsule bid       PREVIDENT 5000 BOOSTER 1.1 % Pste   Generic drug:  Sodium Fluoride      Brush Teeth every morning and evening       PULMICORT FLEXHALER 90 MCG/ACT Aepb   Generic drug:  BUDESONIDE (INHALATION)      Inhale 1 puff into the lungs 2 times daily       ranitidine 300 MG tablet    ZANTAC    90 tablet    Take 1 tablet (300 mg) by mouth At Bedtime       TiZANidine HCl 2 MG Caps     30 capsule    Take 2 mg by mouth 3 times daily as needed       traZODone 100 MG tablet    DESYREL    90 tablet    Take 1 tablet (100 mg) by mouth At Bedtime       Vitamin D (Cholecalciferol) 1000 UNITS Tabs     90 tablet    Take 1 tablet by mouth daily       warfarin 2 MG tablet    COUMADIN    30 tablet    As directed by Anticoagulation Clinic, currently establishing a maintenance dose

## 2017-05-17 NOTE — NURSING NOTE
"Chief Complaint   Patient presents with     Chest Pain     Has been having some chest pain, comes and goes for a couple of days, Feels like her heart is ripping out of her chest.     Dizziness     Has been feeling dizzy for a couple of weeks.     Health Maintenance     Notified patient she is due for a pap.       Initial /74  Pulse 89  Temp 98.1  F (36.7  C) (Tympanic)  Ht 5' 4.5\" (1.638 m)  Wt 186 lb 8 oz (84.6 kg)  BMI 31.52 kg/m2 Estimated body mass index is 31.52 kg/(m^2) as calculated from the following:    Height as of this encounter: 5' 4.5\" (1.638 m).    Weight as of this encounter: 186 lb 8 oz (84.6 kg).  Medication Reconciliation: complete  "

## 2017-05-17 NOTE — PATIENT INSTRUCTIONS
Labs: kidney function, electrolytes, random glucose, hemoglobin, thyroid and repeat liver enzymes.  If liver function still abnormal will need to follow up with GI     For diabetes increase Glipizide to 2 tablets AM and 1 tablet PM with meals       Chest pains are due costochondritis    For pain try Tizanidine 1 capsule 3 times daily as needed  Apply Capsaicin cream as needed every 6 hours

## 2017-05-18 LAB — ANA SER QL IA: NORMAL

## 2017-05-18 ASSESSMENT — ANXIETY QUESTIONNAIRES
7. FEELING AFRAID AS IF SOMETHING AWFUL MIGHT HAPPEN: NOT AT ALL
4. TROUBLE RELAXING: NOT AT ALL
5. BEING SO RESTLESS THAT IT IS HARD TO SIT STILL: NOT AT ALL
1. FEELING NERVOUS, ANXIOUS, OR ON EDGE: NOT AT ALL
6. BECOMING EASILY ANNOYED OR IRRITABLE: NOT AT ALL
GAD7 TOTAL SCORE: 0
2. NOT BEING ABLE TO STOP OR CONTROL WORRYING: NOT AT ALL
3. WORRYING TOO MUCH ABOUT DIFFERENT THINGS: NOT AT ALL

## 2017-05-19 DIAGNOSIS — R94.5 ABNORMAL RESULTS OF LIVER FUNCTION STUDIES: ICD-10-CM

## 2017-05-19 DIAGNOSIS — Z79.899 MEDICATION MANAGEMENT: ICD-10-CM

## 2017-05-19 DIAGNOSIS — N18.30 CKD (CHRONIC KIDNEY DISEASE) STAGE 3, GFR 30-59 ML/MIN (H): ICD-10-CM

## 2017-05-19 LAB
ALBUMIN SERPL-MCNC: 3.5 G/DL (ref 3.4–5)
ALP SERPL-CCNC: 255 U/L (ref 40–150)
ALT SERPL W P-5'-P-CCNC: 73 U/L (ref 0–50)
ANION GAP SERPL CALCULATED.3IONS-SCNC: 12 MMOL/L (ref 3–14)
AST SERPL W P-5'-P-CCNC: 46 U/L (ref 0–45)
BUN SERPL-MCNC: 25 MG/DL (ref 7–30)
CALCIUM SERPL-MCNC: 9.5 MG/DL (ref 8.5–10.1)
CHLORIDE SERPL-SCNC: 107 MMOL/L (ref 94–109)
CO2 SERPL-SCNC: 23 MMOL/L (ref 20–32)
CREAT SERPL-MCNC: 1.66 MG/DL (ref 0.52–1.04)
GFR SERPL CREATININE-BSD FRML MDRD: 36 ML/MIN/1.7M2
GLUCOSE SERPL-MCNC: 138 MG/DL (ref 70–99)
PHOSPHATE SERPL-MCNC: 3.4 MG/DL (ref 2.5–4.5)
POTASSIUM SERPL-SCNC: 3.9 MMOL/L (ref 3.4–5.3)
SODIUM SERPL-SCNC: 142 MMOL/L (ref 133–144)

## 2017-05-19 PROCEDURE — 84075 ASSAY ALKALINE PHOSPHATASE: CPT | Performed by: CLINICAL NURSE SPECIALIST

## 2017-05-19 PROCEDURE — 85025 COMPLETE CBC W/AUTO DIFF WBC: CPT | Performed by: CLINICAL NURSE SPECIALIST

## 2017-05-19 PROCEDURE — 84450 TRANSFERASE (AST) (SGOT): CPT | Performed by: CLINICAL NURSE SPECIALIST

## 2017-05-19 PROCEDURE — 80069 RENAL FUNCTION PANEL: CPT | Performed by: CLINICAL NURSE SPECIALIST

## 2017-05-19 PROCEDURE — 36415 COLL VENOUS BLD VENIPUNCTURE: CPT | Performed by: CLINICAL NURSE SPECIALIST

## 2017-05-19 PROCEDURE — 84460 ALANINE AMINO (ALT) (SGPT): CPT | Performed by: CLINICAL NURSE SPECIALIST

## 2017-05-19 ASSESSMENT — ANXIETY QUESTIONNAIRES: GAD7 TOTAL SCORE: 0

## 2017-05-19 ASSESSMENT — PATIENT HEALTH QUESTIONNAIRE - PHQ9: SUM OF ALL RESPONSES TO PHQ QUESTIONS 1-9: 2

## 2017-05-22 DIAGNOSIS — R94.5 ABNORMAL RESULTS OF LIVER FUNCTION STUDIES: Primary | ICD-10-CM

## 2017-05-22 NOTE — PROGRESS NOTES
(R94.5) Abnormal results of liver function studies  (primary encounter diagnosis)  Plan: Hepatitis B surface antigen, Hepatitis C         antibody, HIV Antigen Antibody Combo  -d/c Trazodone as it might affect liver function       VERONIQUE Spears CNP

## 2017-05-23 ENCOUNTER — ANTICOAGULATION THERAPY VISIT (OUTPATIENT)
Dept: ANTICOAGULATION | Facility: CLINIC | Age: 32
End: 2017-05-23
Payer: MEDICARE

## 2017-05-23 DIAGNOSIS — I82.409 DVT (DEEP VENOUS THROMBOSIS) (H): ICD-10-CM

## 2017-05-23 DIAGNOSIS — Z79.01 ANTICOAGULATION GOAL OF INR 2 TO 3: ICD-10-CM

## 2017-05-23 DIAGNOSIS — Z51.81 ANTICOAGULATION GOAL OF INR 2 TO 3: ICD-10-CM

## 2017-05-23 LAB — INR POINT OF CARE: 3.9 (ref 0.86–1.14)

## 2017-05-23 PROCEDURE — 85610 PROTHROMBIN TIME: CPT | Mod: QW

## 2017-05-23 PROCEDURE — 99207 ZZC NO CHARGE NURSE ONLY: CPT

## 2017-05-23 PROCEDURE — 36416 COLLJ CAPILLARY BLOOD SPEC: CPT

## 2017-05-23 RX ORDER — WARFARIN SODIUM 2 MG/1
TABLET ORAL
Qty: 30 TABLET | Refills: 1 | COMMUNITY
Start: 2017-05-23 | End: 2017-05-31

## 2017-05-23 NOTE — PROGRESS NOTES
ANTICOAGULATION FOLLOW-UP CLINIC VISIT    Patient Name:  Divina Delgadillo  Date:  5/23/2017  Contact Type:  Face to Face    SUBJECTIVE:     Patient Findings     Positives No Problem Findings (no concerns or problems reported)    Comments Change in diet/appetite - pt has not been eating her usual amount of Vit K foods, she will have a serving today and then get back to her usual weekly amouint  Reviewed s/s of bleeding, what to observe for and how to report if needed and to seek medical attention for uncontrolled bleeding or head trauma, verbalized understanding           OBJECTIVE    INR Protime   Date Value Ref Range Status   05/23/2017 3.9 (A) 0.86 - 1.14 Final       ASSESSMENT / PLAN  INR assessment SUPRA    Recheck INR In: 2 WEEKS    INR Location Clinic      Anticoagulation Summary as of 5/23/2017     INR goal 2.0-3.0   Today's INR 3.9!   Maintenance plan 4 mg (2 mg x 2) every day   Full instructions 5/23: 2 mg; Otherwise 4 mg every day   Weekly total 28 mg   Plan last modified Myrna Wiseman RN (5/2/2017)   Next INR check 6/6/2017   Priority INR   Target end date 6/27/2017    Indications   DVT (deep venous thrombosis) (H) [I82.409]         Anticoagulation Episode Summary     INR check location     Preferred lab     Send INR reminders to  ANTICOAG POOL    Comments * Transfer of care, moved to new group home      Anticoagulation Care Providers     Provider Role Specialty Phone number    Jaleesa Velasquez, VERONIQUE CNP Responsible Nurse Practitioner - Gerontology [phone]            See the Encounter Report to view Anticoagulation Flowsheet and Dosing Calendar (Go to Encounters tab in chart review, and find the Anticoagulation Therapy Visit)        Myrna Wiseman RN

## 2017-05-23 NOTE — MR AVS SNAPSHOT
Divina Delgadillo   5/23/2017 2:00 PM   Anticoagulation Therapy Visit    Description:  32 year old female   Provider:  [facility]   Department:  [facility]           INR as of 5/23/2017     Today's INR 3.9!      Anticoagulation Summary as of 5/23/2017     INR goal 2.0-3.0   Today's INR 3.9!   Full instructions 5/23: 2 mg; Otherwise 4 mg every day   Next INR check 6/6/2017    Indications   DVT (deep venous thrombosis) (H) [I82.409]         Description     Recheck INR 2 weeks, 6/6/17  Take warfarin 2 mg today, then resume 4 mg daily  Get back to eating your usual amount of salads and cucumbers, try to be consistent from week to week      Contact Numbers     Please call [phone] to cancel and/or reschedule your appointment.  Please call [phone] with any problems or questions regarding your therapy          May 2017 Details    Sun Mon Tue Wed Thu Fri Sat      1               2               3               4               5               6                 7               8               9               10               11               12               13                 14               15               16               17               18               19               20                 21               22               23      2 mg   See details      24      4 mg         25      4 mg         26      4 mg         27      4 mg           28      4 mg         29      4 mg         30      4 mg         31      4 mg             Date Details   05/23 This INR check   Take 2 mg (2 mg tablets x 1)   have a serving of Vit K food today               How to take your warfarin dose     To take:  2 mg Take 1 of the 2 mg tablets.    To take:  4 mg Take 2 of the 2 mg tablets.           June 2017 Details    Sun Mon Tue Wed Thu Fri Sat         1      4 mg         2      4 mg         3      4 mg           4      4 mg         5      4 mg         6            7               8               9               10                  11               12               13               14               15               16               17                 18               19               20               21               22               23               24                 25               26               27               28               29               30                 Date Details   No additional details    Date of next INR:  6/6/2017         How to take your warfarin dose     To take:  4 mg Take 2 of the 2 mg tablets.

## 2017-05-24 ENCOUNTER — CARE COORDINATION (OUTPATIENT)
Dept: CARE COORDINATION | Facility: CLINIC | Age: 32
End: 2017-05-24

## 2017-05-24 LAB
ACANTHOCYTES BLD QL SMEAR: SLIGHT
BASOPHILS # BLD AUTO: 0.2 10E9/L (ref 0–0.2)
BASOPHILS NFR BLD AUTO: 1 %
DIFFERENTIAL METHOD BLD: ABNORMAL
EOSINOPHIL # BLD AUTO: 0.3 10E9/L (ref 0–0.7)
EOSINOPHIL NFR BLD AUTO: 2 %
ERYTHROCYTE [DISTWIDTH] IN BLOOD BY AUTOMATED COUNT: 15.7 % (ref 10–15)
HCT VFR BLD AUTO: 38.6 % (ref 35–47)
HGB BLD-MCNC: 11.9 G/DL (ref 11.7–15.7)
HOWELL-JOLLY BOD BLD QL SMEAR: PRESENT
LYMPHOCYTES # BLD AUTO: 5.4 10E9/L (ref 0.8–5.3)
LYMPHOCYTES NFR BLD AUTO: 31 %
MCH RBC QN AUTO: 27.4 PG (ref 26.5–33)
MCHC RBC AUTO-ENTMCNC: 30.8 G/DL (ref 31.5–36.5)
MCV RBC AUTO: 89 FL (ref 78–100)
MONOCYTES # BLD AUTO: 0.9 10E9/L (ref 0–1.3)
MONOCYTES NFR BLD AUTO: 5 %
NEUTROPHILS # BLD AUTO: 10.5 10E9/L (ref 1.6–8.3)
NEUTROPHILS NFR BLD AUTO: 61 %
PLATELET # BLD AUTO: 553 10E9/L (ref 150–450)
RBC # BLD AUTO: 4.35 10E12/L (ref 3.8–5.2)
WBC # BLD AUTO: 17.3 10E9/L (ref 4–11)

## 2017-05-24 NOTE — PROGRESS NOTES
Clinic Care Coordination Contact  Care Team Conversations    RN CC called to f/u with pt's caregiver, Lorin. Lorin reports pt got her diabetic shoes and the pt loves them. Pt has also been referred to a liver specialist and Lorin has set this apt up in [place].   Pt has stabilized since moving into Lorin's foster home. Lorin has found the support of care coordination beneficial. Ongoing support is something the caregiver would appreciate.   Plan:  Monthly RN CC outreach for the next couple months to ensure pt continues to be stable and caregiver has support to be able to effectively care for pt.     Sobeida Greer, RN, BSN, PHN   Clinic Care Coordinator  [facility]:  [email or web address]   Office: [phone] Fax: [phone]

## 2017-05-25 ENCOUNTER — TELEPHONE (OUTPATIENT)
Dept: FAMILY MEDICINE | Facility: CLINIC | Age: 32
End: 2017-05-25

## 2017-05-26 DIAGNOSIS — R94.5 ABNORMAL RESULTS OF LIVER FUNCTION STUDIES: ICD-10-CM

## 2017-05-26 PROCEDURE — 87340 HEPATITIS B SURFACE AG IA: CPT | Performed by: NURSE PRACTITIONER

## 2017-05-26 PROCEDURE — 86803 HEPATITIS C AB TEST: CPT | Performed by: NURSE PRACTITIONER

## 2017-05-26 PROCEDURE — 87389 HIV-1 AG W/HIV-1&-2 AB AG IA: CPT | Performed by: NURSE PRACTITIONER

## 2017-05-26 PROCEDURE — 36415 COLL VENOUS BLD VENIPUNCTURE: CPT | Performed by: NURSE PRACTITIONER

## 2017-05-30 LAB
HBV SURFACE AG SERPL QL IA: NONREACTIVE
HCV AB SERPL QL IA: NORMAL
HIV 1+2 AB+HIV1 P24 AG SERPL QL IA: NORMAL

## 2017-05-30 NOTE — TELEPHONE ENCOUNTER
WILLIAMS for Lorin again. Will await return call.    Your most recent lab results are attached.  Your creatinine at this time unfortunately is higher than previously seen. I am not certain if you are still on the lithium at this time but if able to get away from it, I think that would be a good option. I encourage you to follow-up with Dr. Rivero to have this discussion. Please call with any questions or concerns.     Sincerely,     Sánchez Dias, DO     Team: I think we reached out to Dr. Rivero at the [facility] previously? Any updates from them at all on her lithium and whether she is still on it? Thank you KW     Shelley Vail, RN, BSN  Nephrology Care Coordinator  [facility]

## 2017-05-31 ENCOUNTER — CARE COORDINATION (OUTPATIENT)
Dept: CARE COORDINATION | Facility: CLINIC | Age: 32
End: 2017-05-31

## 2017-05-31 DIAGNOSIS — Z51.81 ANTICOAGULATION GOAL OF INR 2 TO 3: ICD-10-CM

## 2017-05-31 DIAGNOSIS — Z79.01 ANTICOAGULATION GOAL OF INR 2 TO 3: ICD-10-CM

## 2017-05-31 RX ORDER — WARFARIN SODIUM 2 MG/1
TABLET ORAL
Qty: 60 TABLET | Refills: 0 | Status: SHIPPED | OUTPATIENT
Start: 2017-05-31 | End: 2017-06-06

## 2017-05-31 NOTE — TELEPHONE ENCOUNTER
Signed Prescriptions:                        Disp   Refills    warfarin (COUMADIN) 2 MG tablet            60 tab*0        Sig: As directed by Anticoagulation Clinic, current dose 4           mg daily  Authorizing Provider: FRANKY WASHINGTON  Ordering User: LENO SANTACRUZ RN refilled medication per [facility] refill protocol. One month given since patient is scheduled to end warfarin at the end of June.  Leno Santacruz RN

## 2017-05-31 NOTE — TELEPHONE ENCOUNTER
Gerda    Last Written Prescription Date: 05/23/2017  Last Fill Qty: 30, # refills: 1  Last Office Visit with [facility], [facility] or [facility] prescribing provider: 05/17/2017  Next 5 appointments (look out 90 days)     Jul 10, 2017  2:00 PM CDT   Return Visit with Sánchez Dias MD   [facility]    [address] [place] [id number] [phone]                   Date and Result of Last PT/INR:   Lab Results   Component Value Date    INR 3.9 05/23/2017    INR 3.3 05/02/2017    INR 2.48 02/27/2017    INR 0.93 08/09/2006

## 2017-05-31 NOTE — PROGRESS NOTES
Clinic Care Coordination Contact  Care Team Conversations    RN CC received a VM from pt's caregiver, Lorin. RN CC called caregiver back and left a VM requesting a call back if she still has questions.   If not call back, RN CC will call and f/u in 2-3 weeks as planned.     Sobeida Greer RN, BSN, PHN   Clinic Care Coordinator  [facility]:  [email or web address]   Office: [phone] Fax: [phone]

## 2017-06-06 ENCOUNTER — ANTICOAGULATION THERAPY VISIT (OUTPATIENT)
Dept: ANTICOAGULATION | Facility: CLINIC | Age: 32
End: 2017-06-06
Payer: MEDICARE

## 2017-06-06 DIAGNOSIS — I82.409 DVT (DEEP VENOUS THROMBOSIS) (H): ICD-10-CM

## 2017-06-06 DIAGNOSIS — Z79.01 ANTICOAGULATION GOAL OF INR 2 TO 3: ICD-10-CM

## 2017-06-06 DIAGNOSIS — Z51.81 ANTICOAGULATION GOAL OF INR 2 TO 3: ICD-10-CM

## 2017-06-06 LAB — INR POINT OF CARE: 3.8 (ref 0.86–1.14)

## 2017-06-06 PROCEDURE — 36416 COLLJ CAPILLARY BLOOD SPEC: CPT

## 2017-06-06 PROCEDURE — 85610 PROTHROMBIN TIME: CPT | Mod: QW

## 2017-06-06 PROCEDURE — 99207 ZZC NO CHARGE NURSE ONLY: CPT

## 2017-06-06 RX ORDER — WARFARIN SODIUM 2 MG/1
TABLET ORAL
Qty: 60 TABLET | Refills: 0 | COMMUNITY
Start: 2017-06-06 | End: 2017-06-08

## 2017-06-06 NOTE — PROGRESS NOTES
ANTICOAGULATION FOLLOW-UP CLINIC VISIT    Patient Name:  Divina Delgadillo  Date:  6/6/2017  Contact Type:  Face to Face    SUBJECTIVE:     Patient Findings     Positives No Problem Findings (no changes, concerns or problems reported), Unexplained INR or factor level change (no reason found for continued elevated INR,)           OBJECTIVE    INR Protime   Date Value Ref Range Status   06/06/2017 3.8 (A) 0.86 - 1.14 Final       ASSESSMENT / PLAN  INR assessment SUPRA    Recheck INR In: 2 WEEKS    INR Location Clinic      Anticoagulation Summary as of 6/6/2017     INR goal 2.0-3.0   Today's INR 3.8!   Maintenance plan 2 mg (2 mg x 1) on Tue, Fri; 4 mg (2 mg x 2) all other days   Full instructions 2 mg on Tue, Fri; 4 mg all other days   Weekly total 24 mg   Plan last modified Myrna Wiseman RN (6/6/2017)   Next INR check 6/20/2017   Priority INR   Target end date 6/27/2017    Indications   DVT (deep venous thrombosis) (H) [I82.409]         Anticoagulation Episode Summary     INR check location     Preferred lab     Send INR reminders to CL ANTICOAG POOL    Comments * Transfer of care, moved to new group home      Anticoagulation Care Providers     Provider Role Specialty Phone number    Jaleesa Velasquez APRN CNP Responsible Nurse Practitioner - Gerontology [phone]            See the Encounter Report to view Anticoagulation Flowsheet and Dosing Calendar (Go to Encounters tab in chart review, and find the Anticoagulation Therapy Visit)        Myrna Wiseman RN

## 2017-06-06 NOTE — MR AVS SNAPSHOT
Divina Delgadillo   6/6/2017 1:45 PM   Anticoagulation Therapy Visit    Description:  32 year old female   Provider:  [facility]   Department:  [facility]           INR as of 6/6/2017     Today's INR 3.8!      Anticoagulation Summary as of 6/6/2017     INR goal 2.0-3.0   Today's INR 3.8!   Full instructions 2 mg on Tue, Fri; 4 mg all other days   Next INR check 6/20/2017    Indications   DVT (deep venous thrombosis) (H) [I82.409]         Description     Recheck INR 2 weeks, 6/20/17  Decrease dose to 2 mg Tues, Fri, 4 mg rest of week         Contact Numbers     Please call [phone] to cancel and/or reschedule your appointment.  Please call [phone] with any problems or questions regarding your therapy          June 2017 Details    Sun Mon Tue Wed Thu Fri Sat         1               2               3                 4               5               6      2 mg   See details      7      4 mg         8      4 mg         9      2 mg         10      4 mg           11      4 mg         12      4 mg         13      2 mg         14      4 mg         15      4 mg         16      2 mg         17      4 mg           18      4 mg         19      4 mg         20            21               22               23               24                 25               26               27               28               29               30                 Date Details   06/06 This INR check       Date of next INR:  6/20/2017         How to take your warfarin dose     To take:  2 mg Take 1 of the 2 mg tablets.    To take:  4 mg Take 2 of the 2 mg tablets.

## 2017-06-07 ENCOUNTER — OFFICE VISIT (OUTPATIENT)
Dept: FAMILY MEDICINE | Facility: CLINIC | Age: 32
End: 2017-06-07
Payer: MEDICARE

## 2017-06-07 VITALS
HEART RATE: 86 BPM | TEMPERATURE: 97.5 F | SYSTOLIC BLOOD PRESSURE: 120 MMHG | BODY MASS INDEX: 31.4 KG/M2 | DIASTOLIC BLOOD PRESSURE: 64 MMHG | WEIGHT: 188.5 LBS | HEIGHT: 65 IN

## 2017-06-07 DIAGNOSIS — Z30.9 ENCOUNTER FOR CONTRACEPTIVE MANAGEMENT, UNSPECIFIED CONTRACEPTIVE ENCOUNTER TYPE: ICD-10-CM

## 2017-06-07 DIAGNOSIS — Z86.718 PERSONAL HISTORY OF DVT (DEEP VEIN THROMBOSIS): ICD-10-CM

## 2017-06-07 DIAGNOSIS — R31.9 HEMATURIA: Primary | ICD-10-CM

## 2017-06-07 LAB
ALBUMIN UR-MCNC: ABNORMAL MG/DL
APPEARANCE UR: CLEAR
BACTERIA #/AREA URNS HPF: ABNORMAL /HPF
BILIRUB UR QL STRIP: NEGATIVE
COLOR UR AUTO: YELLOW
GLUCOSE UR STRIP-MCNC: NEGATIVE MG/DL
HGB UR QL STRIP: ABNORMAL
INR PPP: 2.48 (ref 0.86–1.14)
KETONES UR STRIP-MCNC: NEGATIVE MG/DL
LEUKOCYTE ESTERASE UR QL STRIP: NEGATIVE
MUCOUS THREADS #/AREA URNS LPF: ABNORMAL /LPF
NITRATE UR QL: NEGATIVE
NON-SQ EPI CELLS #/AREA URNS LPF: ABNORMAL /LPF
PH UR STRIP: 6 PH (ref 5–7)
RBC #/AREA URNS AUTO: ABNORMAL /HPF (ref 0–2)
SP GR UR STRIP: >1.03 (ref 1–1.03)
URN SPEC COLLECT METH UR: ABNORMAL
UROBILINOGEN UR STRIP-ACNC: 0.2 EU/DL (ref 0.2–1)
WBC #/AREA URNS AUTO: ABNORMAL /HPF (ref 0–2)

## 2017-06-07 PROCEDURE — 99214 OFFICE O/P EST MOD 30 MIN: CPT | Performed by: NURSE PRACTITIONER

## 2017-06-07 PROCEDURE — 36415 COLL VENOUS BLD VENIPUNCTURE: CPT | Performed by: NURSE PRACTITIONER

## 2017-06-07 PROCEDURE — 81001 URINALYSIS AUTO W/SCOPE: CPT | Performed by: NURSE PRACTITIONER

## 2017-06-07 PROCEDURE — 85610 PROTHROMBIN TIME: CPT | Performed by: NURSE PRACTITIONER

## 2017-06-07 NOTE — PROGRESS NOTES
SUBJECTIVE:                                                    Divina Delgadillo is a 32 year old female who presents to clinic today for the following health issues: blood in the urine since this morning. Denies abdominal pain, pelvic pain and flank pain. Denies fever, chills and dysuria. The patient has history of DVT, on Coumadin since January, INR yesterday was 3.8, the patient was instructed to continue Coumadin without dose change, she took 2 mg yesterday and suppose to take 4 mg today. The patient had left radial vein DVT, will repeat venous US today, if normal okay to d/c Coumadin  The patient would like to go back on birth control, but due to history of DVT oral BC are contradicted.   No personal, or family history of coagulopathies, so I think Mirena IUD would be a good option, systemic absorption of levonorgestrel from IUD is low and is unlikely to be associated with an increased risk of VTE.     URINARY TRACT SYMPTOMS     Onset: This Morning     Description:   Painful urination (Dysuria): no   Blood in urine (Hematuria): YES  Delay in urine (Hesitency): no     Intensity: moderate    Progression of Symptoms:  same    Accompanying Signs & Symptoms:  Fever/chills: no   Flank pain no   Nausea and vomiting: no   Any vaginal symptoms: abnormal vaginal bleeding  Abdominal/Pelvic Pain: no    History:   History of frequent UTI's: no   History of kidney stones: no   Sexually Active: no   Possibility of pregnancy: No    Precipitating factors:   None        Therapies Tried and outcome: None     Problem list and histories reviewed & adjusted, as indicated.  Additional history: as documented    Labs reviewed in EPIC    Reviewed and updated as needed this visit by clinical staff  Tobacco  Allergies  Med Hx  Surg Hx  Fam Hx  Soc Hx      Reviewed and updated as needed this visit by Provider         ROS:  Constitutional, HEENT, cardiovascular, pulmonary, gi and gu systems are negative, except as otherwise  "noted.    OBJECTIVE:                                                    /64  Pulse 86  Temp 97.5  F (36.4  C) (Tympanic)  Ht 5' 4.5\" (1.638 m)  Wt 188 lb 8 oz (85.5 kg)  BMI 31.86 kg/m2  Body mass index is 31.86 kg/(m^2).  GENERAL: healthy, alert and no distress  ABDOMEN: soft, nontender, no hepatosplenomegaly, no masses and bowel sounds normal  BACK: no CVA tenderness, no paralumbar tenderness  PSYCH: mentation appears normal, affect normal/bright    Diagnostic Test Results:  INR-pending     ASSESSMENT/PLAN:                                                      BMI:   Estimated body mass index is 31.86 kg/(m^2) as calculated from the following:    Height as of this encounter: 5' 4.5\" (1.638 m).    Weight as of this encounter: 188 lb 8 oz (85.5 kg).   Weight management plan: Discussed healthy diet and exercise guidelines and patient will follow up in 6 months in clinic to re-evaluate.    1. Hematuria  -due to elevated INR  -hold Coumadin, recheck INR today  - UA reflex to Microscopic and Culture ([place] [facility] (except [place] and [place])  - Urine Microscopic  - INR    2. Personal history of DVT (deep vein thrombosis)    - US Extremity Upper Venous  lt; Future, if normal will d/c Coumadin     3. Encounter for contraceptive management, unspecified contraceptive encounter type  - OB/GYN REFERRAL  -not good candidate for oral contraception, recommended to see GYN to discuss Mirena IUD, systemic absorption of levonorgestrel from IUD is low and is unlikely to be associated with an increased risk of VTE.   -also can have pap test done at the same time    See Patient Instructions    VERONIQUE Spears [facility]  "

## 2017-06-07 NOTE — NURSING NOTE
"Chief Complaint   Patient presents with     UTI     Blood in urine since this morning.       Initial /64  Pulse 86  Temp 97.5  F (36.4  C) (Tympanic)  Ht 5' 4.5\" (1.638 m)  Wt 188 lb 8 oz (85.5 kg)  BMI 31.86 kg/m2 Estimated body mass index is 31.86 kg/(m^2) as calculated from the following:    Height as of this encounter: 5' 4.5\" (1.638 m).    Weight as of this encounter: 188 lb 8 oz (85.5 kg).  Medication Reconciliation: complete  "

## 2017-06-07 NOTE — MR AVS SNAPSHOT
After Visit Summary   6/7/2017    Divina Delgadillo    MRN: [id number]           Patient Information     Date Of Birth          1985        Visit Information        Provider Department      6/7/2017 8:40 AM Jaleesa Velasquez APRN CNP [facility]        Today's Diagnoses     Hematuria    -  1    Personal history of DVT (deep vein thrombosis)        Encounter for contraceptive management, unspecified contraceptive encounter type          Care Instructions    Urine showed moderate amount of blood, but no infection  Blood in the urine due to elevated INR  Repeat INR today   Repeat venous US, if normal can stop Coumadin  [facility]  at [phone], to schedule this appointment.  Follow up with GYN to discuss IUD and get pap test               Follow-ups after your visit        Additional Services     OB/GYN REFERRAL       Your provider has referred you to:  FMG: [facility] [phone]   [email or web address]    Please be aware that coverage of these services is subject to the terms and limitations of your health insurance plan.  Call member services at your health plan with any benefit or coverage questions.      Please bring the following with you to your appointment:    (1) Any X-Rays, CTs or MRIs which have been performed.  Contact the facility where they were done to arrange for  prior to your scheduled appointment.   (2) List of current medications   (3) This referral request   (4) Any documents/labs given to you for this referral                  Your next 10 appointments already scheduled     Jun 20, 2017  3:15 PM CDT   [facility] with CL ANTI [facility]AG   [facility]    [address]  [facility] [address]   [phone]            Jun 27, 2017 10:00 AM CDT   LAB with LAB FIRST FLOOR [facility]  [address] [address]   [phone]           Patient must bring picture ID.  Patient should be prepared to give a urine specimen  Please do not eat 10-12 hours before your appointment if you are coming in fasting for labs on lipids, cholesterol, or glucose (sugar).  Pregnant women should follow their Care Team instructions. Water with medications is okay. Do not drink coffee or other fluids.   If you have concerns about taking  your medications, please ask at office or if scheduling via [employer]al[employer], send a message by clicking on Secure Messaging, Message Your Care Team.            Jul 10, 2017  2:00 PM CDT   Return Visit with Sánchez Dias MD   [email or web address] ([email or web address]    [address] [place] [address]   [phone]            Jul 27, 2017  1:00 PM CDT   New Visit with Jeremiah Perdomo MD   [email or web address] ([email or web address]    [address] [place] [address]   [phone]              Future tests that were ordered for you today     Open Future Orders        Priority Expected Expires Ordered    US Extremity Upper Venous  lt Routine  6/7/2018 6/7/2017            Who to contact     If you have questions or need follow up information about today's clinic visit or your schedule please contact [facility] directly at [phone].  Normal or non-critical lab and imaging results will be communicated to you by MyChart, letter or phone within 4 business days after the clinic has received the results. If you do not hear from us within 7 days, please contact the clinic through MyChart or phone. If you have a critical or abnormal lab result, we will notify you by phone as soon as possible.  Submit refill requests through [employer] or call your pharmacy and they will forward the refill request to us. Please allow 3 business days for your refill to be completed.           "Additional Information About Your Visit        MyChart Information     [employer] lets you send messages to your doctor, view your test results, renew your prescriptions, schedule appointments and more. To sign up, go to [email or web address] . Click on \"Log in\" on the left side of the screen, which will take you to the Welcome page. Then click on \"Sign up Now\" on the right side of the page.     You will be asked to enter the access code listed below, as well as some personal information. Please follow the directions to create your username and password.     Your access code is: [email or web address]  Expires: 2017  9:32 AM     Your access code will  in 90 days. If you need help or a new code, please call your [facility] clinic or [phone].        Care EveryWhere ID     This is your [facility] EveryWhere ID. This could be used by other organizations to access your [facility] medical records  [id number]        Your Vitals Were     Pulse Temperature Height BMI (Body Mass Index)          86 97.5  F (36.4  C) (Tympanic) 5' 4.5\" (1.638 m) 31.86 kg/m2         Blood Pressure from Last 3 Encounters:   17 120/64   17 130/68   17 137/43    Weight from Last 3 Encounters:   17 188 lb 8 oz (85.5 kg)   17 186 lb 8 oz (84.6 kg)   17 188 lb (85.3 kg)              We Performed the Following     *UA reflex to Microscopic and Culture ([employer] [facility] (except [place] and [place])     INR     OB/GYN REFERRAL     Urine Microscopic          Today's Medication Changes          These changes are accurate as of: 17  9:32 AM.  If you have any questions, ask your nurse or doctor.               These medicines have changed or have updated prescriptions.        Dose/Directions    glipiZIDE 5 MG tablet   Commonly known as:  GLUCOTROL   This may have changed:    - how much to take  - how to take this  - additional instructions   Used for:  Type 2 diabetes mellitus with stage 3 chronic kidney " disease, without long-term current use of insulin (H)        Take 2 tablets AM and 1 tablet PM before meals   Quantity:  45 tablet   Refills:  1         Stop taking these medicines if you haven't already. Please contact your care team if you have questions.     PREVIDENT 5000 BOOSTER 1.1 % Pste   Generic drug:  Sodium Fluoride   Stopped by:  Jaleesa Velasquez APRN CNP                    Primary Care Provider Office Phone # Fax #    VERONIQUE Bledsoe [phone]       [facility] [address] [facility] [address]        Thank you!     Thank you for choosing [facility]  for your care. Our goal is always to provide you with excellent care. Hearing back from our patients is one way we can continue to improve our services. Please take a few minutes to complete the written survey that you may receive in the mail after your visit with us. Thank you!             Your Updated Medication List - Protect others around you: Learn how to safely use, store and throw away your medicines at www.disposemymeds.org.          This list is accurate as of: 6/7/17  9:32 AM.  Always use your most recent med list.                   Brand Name Dispense Instructions for use    ABILIFY 30 MG tablet   Generic drug:  ARIPiprazole      Take 30 mg by mouth daily       amLODIPine 5 MG tablet    NORVASC    90 tablet    Take 1 tablet (5 mg) by mouth daily       blood glucose lancets standard    no brand specified    1    needs lancet        blood glucose monitoring test strip    no brand specified    100 each    100 strips by In Vitro route daily Use to test blood sugar 1 times daily  And as needed ACCU Check Haylee Plus Test Strips       chlorthalidone 25 MG tablet    HYGROTON    30 tablet    Take 0.5 tablets (12.5 mg) by mouth daily       Clozapine 200 MG tablet    CLOZARIL     Take 200 mg by mouth 2 times daily       FLUoxetine 20 MG capsule    PROzac     Take 20 mg by  mouth daily       glipiZIDE 5 MG tablet    GLUCOTROL    45 tablet    Take 2 tablets AM and 1 tablet PM before meals       loratadine 10 MG tablet    CLARITIN    90 tablet    Take 1 tablet (10 mg) by mouth every morning       MELATONIN PO      Take 3 mg by mouth nightly as needed       montelukast 10 MG tablet    SINGULAIR    90 tablet    Take 1 tablet (10 mg) by mouth At Bedtime       naltrexone 50 MG tablet    DEPADE;REVIA     Take 50 mg by mouth every morning       omeprazole 20 MG CR capsule    priLOSEC    60 capsule    1 capsule bid       ranitidine 300 MG tablet    ZANTAC    90 tablet    Take 1 tablet (300 mg) by mouth At Bedtime       Vitamin D (Cholecalciferol) 1000 UNITS Tabs     90 tablet    Take 1 tablet by mouth daily       warfarin 2 MG tablet    COUMADIN    60 tablet    As directed by Anticoagulation Clinic, current dose 2 mg Tues, Fri, 4 mg rest of week

## 2017-06-07 NOTE — PATIENT INSTRUCTIONS
Urine showed moderate amount of blood, but no infection  Blood in the urine due to elevated INR  Repeat INR today   Repeat venous US, if normal can stop Coumadin  [facility]  at [phone], to schedule this appointment.  Follow up with GYN to discuss IUD and get pap test

## 2017-06-08 ENCOUNTER — HOSPITAL ENCOUNTER (OUTPATIENT)
Dept: ULTRASOUND IMAGING | Facility: CLINIC | Age: 32
Discharge: HOME | End: 2017-06-08
Attending: NURSE PRACTITIONER | Admitting: NURSE PRACTITIONER
Payer: MEDICARE

## 2017-06-08 DIAGNOSIS — Z86.718 PERSONAL HISTORY OF DVT (DEEP VEIN THROMBOSIS): ICD-10-CM

## 2017-06-08 PROBLEM — Z79.01 ANTICOAGULATION GOAL OF INR 2 TO 3: Status: RESOLVED | Noted: 2017-02-27 | Resolved: 2017-06-08

## 2017-06-08 PROBLEM — Z51.81 ANTICOAGULATION GOAL OF INR 2 TO 3: Status: RESOLVED | Noted: 2017-02-27 | Resolved: 2017-06-08

## 2017-06-08 PROCEDURE — 93971 EXTREMITY STUDY: CPT | Mod: LT

## 2017-06-13 NOTE — TELEPHONE ENCOUNTER
Spoke to Lorin in regards to Divina and asked if she is currently taking Lithium and she stated at this time she is not. Lorin stated that she has been getting the calls and voicemail's and will contact us at a later time.    Brandon Ryder Medical Assistant

## 2017-06-13 NOTE — TELEPHONE ENCOUNTER
Message left on voicemail at 3:37 pm from Lorin that she was retuning call and can be reached at [phone].      Darla Severin-Brown, LPN

## 2017-06-15 ENCOUNTER — OFFICE VISIT (OUTPATIENT)
Dept: FAMILY MEDICINE | Facility: CLINIC | Age: 32
End: 2017-06-15
Payer: MEDICARE

## 2017-06-15 VITALS
BODY MASS INDEX: 31.39 KG/M2 | DIASTOLIC BLOOD PRESSURE: 64 MMHG | WEIGHT: 188.4 LBS | TEMPERATURE: 97.3 F | SYSTOLIC BLOOD PRESSURE: 128 MMHG | HEIGHT: 65 IN | HEART RATE: 88 BPM

## 2017-06-15 DIAGNOSIS — Z00.00 PHYSICAL EXAM, ANNUAL: Primary | ICD-10-CM

## 2017-06-15 DIAGNOSIS — Z12.4 SCREENING FOR CERVICAL CANCER: ICD-10-CM

## 2017-06-15 DIAGNOSIS — B96.89 BACTERIAL VAGINITIS: ICD-10-CM

## 2017-06-15 DIAGNOSIS — N76.0 BACTERIAL VAGINITIS: ICD-10-CM

## 2017-06-15 DIAGNOSIS — N18.30 TYPE 2 DIABETES MELLITUS WITH STAGE 3 CHRONIC KIDNEY DISEASE, WITHOUT LONG-TERM CURRENT USE OF INSULIN (H): ICD-10-CM

## 2017-06-15 DIAGNOSIS — H66.001 ACUTE SUPPURATIVE OTITIS MEDIA OF RIGHT EAR WITHOUT SPONTANEOUS RUPTURE OF TYMPANIC MEMBRANE, RECURRENCE NOT SPECIFIED: ICD-10-CM

## 2017-06-15 DIAGNOSIS — Z30.9 ENCOUNTER FOR CONTRACEPTIVE MANAGEMENT, UNSPECIFIED CONTRACEPTIVE ENCOUNTER TYPE: ICD-10-CM

## 2017-06-15 DIAGNOSIS — R94.5 ABNORMAL RESULTS OF LIVER FUNCTION STUDIES: ICD-10-CM

## 2017-06-15 DIAGNOSIS — E11.22 TYPE 2 DIABETES MELLITUS WITH STAGE 3 CHRONIC KIDNEY DISEASE, WITHOUT LONG-TERM CURRENT USE OF INSULIN (H): ICD-10-CM

## 2017-06-15 DIAGNOSIS — N89.8 VAGINAL DISCHARGE: ICD-10-CM

## 2017-06-15 LAB
ALP SERPL-CCNC: 179 U/L (ref 40–150)
ALT SERPL W P-5'-P-CCNC: 56 U/L (ref 0–50)
ANION GAP SERPL CALCULATED.3IONS-SCNC: 9 MMOL/L (ref 3–14)
AST SERPL W P-5'-P-CCNC: 48 U/L (ref 0–45)
BUN SERPL-MCNC: 23 MG/DL (ref 7–30)
CALCIUM SERPL-MCNC: 9.1 MG/DL (ref 8.5–10.1)
CHLORIDE SERPL-SCNC: 106 MMOL/L (ref 94–109)
CHOLEST SERPL-MCNC: 300 MG/DL
CO2 SERPL-SCNC: 23 MMOL/L (ref 20–32)
CREAT SERPL-MCNC: 1.35 MG/DL (ref 0.52–1.04)
GFR SERPL CREATININE-BSD FRML MDRD: 46 ML/MIN/1.7M2
GLUCOSE SERPL-MCNC: 168 MG/DL (ref 70–99)
HBA1C MFR BLD: 7.8 % (ref 4.3–6)
HDLC SERPL-MCNC: 72 MG/DL
LDLC SERPL CALC-MCNC: 192 MG/DL
MICRO REPORT STATUS: ABNORMAL
NONHDLC SERPL-MCNC: 228 MG/DL
POTASSIUM SERPL-SCNC: 4 MMOL/L (ref 3.4–5.3)
SODIUM SERPL-SCNC: 138 MMOL/L (ref 133–144)
SPECIMEN SOURCE: ABNORMAL
TRIGL SERPL-MCNC: 179 MG/DL
WET PREP SPEC: ABNORMAL

## 2017-06-15 PROCEDURE — 80061 LIPID PANEL: CPT | Performed by: NURSE PRACTITIONER

## 2017-06-15 PROCEDURE — 83036 HEMOGLOBIN GLYCOSYLATED A1C: CPT | Performed by: NURSE PRACTITIONER

## 2017-06-15 PROCEDURE — 87624 HPV HI-RISK TYP POOLED RSLT: CPT | Performed by: NURSE PRACTITIONER

## 2017-06-15 PROCEDURE — 80048 BASIC METABOLIC PNL TOTAL CA: CPT | Performed by: NURSE PRACTITIONER

## 2017-06-15 PROCEDURE — G0145 SCR C/V CYTO,THINLAYER,RESCR: HCPCS | Performed by: NURSE PRACTITIONER

## 2017-06-15 PROCEDURE — G0101 CA SCREEN;PELVIC/BREAST EXAM: HCPCS | Performed by: NURSE PRACTITIONER

## 2017-06-15 PROCEDURE — 36415 COLL VENOUS BLD VENIPUNCTURE: CPT | Performed by: NURSE PRACTITIONER

## 2017-06-15 PROCEDURE — 84450 TRANSFERASE (AST) (SGOT): CPT | Performed by: NURSE PRACTITIONER

## 2017-06-15 PROCEDURE — 84075 ASSAY ALKALINE PHOSPHATASE: CPT | Performed by: NURSE PRACTITIONER

## 2017-06-15 PROCEDURE — 84460 ALANINE AMINO (ALT) (SGPT): CPT | Performed by: NURSE PRACTITIONER

## 2017-06-15 PROCEDURE — 87210 SMEAR WET MOUNT SALINE/INK: CPT | Performed by: NURSE PRACTITIONER

## 2017-06-15 PROCEDURE — 99213 OFFICE O/P EST LOW 20 MIN: CPT | Mod: 25 | Performed by: NURSE PRACTITIONER

## 2017-06-15 PROCEDURE — G0476 HPV COMBO ASSAY CA SCREEN: HCPCS | Performed by: NURSE PRACTITIONER

## 2017-06-15 PROCEDURE — 99395 PREV VISIT EST AGE 18-39: CPT | Performed by: NURSE PRACTITIONER

## 2017-06-15 RX ORDER — METRONIDAZOLE 500 MG/1
500 TABLET ORAL 2 TIMES DAILY
Qty: 14 TABLET | Refills: 0 | Status: SHIPPED | OUTPATIENT
Start: 2017-06-15 | End: 2017-09-07

## 2017-06-15 RX ORDER — NEOMYCIN SULFATE, POLYMYXIN B SULFATE AND HYDROCORTISONE 10; 3.5; 1 MG/ML; MG/ML; [USP'U]/ML
4 SUSPENSION/ DROPS AURICULAR (OTIC) 4 TIMES DAILY
Qty: 6 ML | Refills: 0 | Status: SHIPPED | OUTPATIENT
Start: 2017-06-15 | End: 2017-06-22

## 2017-06-15 NOTE — MR AVS SNAPSHOT
After Visit Summary   6/15/2017    Divina Delgadillo    MRN: [id number]           Patient Information     Date Of Birth          1985        Visit Information        Provider Department      6/15/2017 8:40 AM Jaleesa Velasquez APRN [facility]        Today's Diagnoses     Physical exam, annual    -  1    Screening for cervical cancer        Type 2 diabetes mellitus with stage 3 chronic kidney disease, without long-term current use of insulin (H)        Vaginal discharge        Abnormal results of liver function studies        Encounter for contraceptive management, unspecified contraceptive encounter type          Care Instructions      Preventive Health Recommendations  Female Ages 26 - 39  Yearly exam:   See your health care provider every year in order to    Review health changes.     Discuss preventive care.      Review your medicines if you your doctor has prescribed any.    Until age 30: Get a Pap test every three years (more often if you have had an abnormal result).    After age 30: Talk to your doctor about whether you should have a Pap test every 3 years or have a Pap test with HPV screening every 5 years.   You do not need a Pap test if your uterus was removed (hysterectomy) and you have not had cancer.  You should be tested each year for STDs (sexually transmitted diseases), if you're at risk.   Talk to your provider about how often to have your cholesterol checked.  If you are at risk for diabetes, you should have a diabetes test (fasting glucose).  Shots: Get a flu shot each year. Get a tetanus shot every 10 years.   Nutrition:     Eat at least 5 servings of fruits and vegetables each day.    Eat whole-grain bread, whole-wheat pasta and brown rice instead of white grains and rice.    Talk to your provider about Calcium and Vitamin D.     Lifestyle    Exercise at least 150 minutes a week (30 minutes a day, 5 days of the week). This will help you control your  weight and prevent disease.    Limit alcohol to one drink per day.    No smoking.     Wear sunscreen to prevent skin cancer.    See your dentist every six months for an exam and cleaning.  Schedule GYN appointment for IUD consult  Labs:  Liver function recheck  A1C  BMP  WET prep  Pap test             Follow-ups after your visit        Additional Services     OB/GYN REFERRAL       Your provider has referred you to:  FMG: [facility] [phone]   [email or web address]    FOR IUD consult     Please be aware that coverage of these services is subject to the terms and limitations of your health insurance plan.  Call member services at your health plan with any benefit or coverage questions.      Please bring the following with you to your appointment:    (1) Any X-Rays, CTs or MRIs which have been performed.  Contact the facility where they were done to arrange for  prior to your scheduled appointment.   (2) List of current medications   (3) This referral request   (4) Any documents/labs given to you for this referral                  Your next 10 appointments already scheduled     Jun 20, 2017  3:15 PM CDT   Anticoagulation Visit with CL ANTI COAG   [facility] ([facility])    [address] [facility] [id number] [phone]            Jun 27, 2017 10:00 AM CDT   LAB with LAB FIRST FLOOR [facility] ([facility])    [address] [place] [address] [phone]           Patient must bring picture ID.  Patient should be prepared to give a urine specimen  Please do not eat 10-12 hours before your appointment if you are coming in fasting for labs on lipids, cholesterol, or glucose (sugar).  Pregnant women should follow their Care Team instructions. Water with medications is okay. Do not drink coffee or other fluids.   If you have concerns about taking  your  "medications, please ask at office or if scheduling via [employer], send a message by clicking on Secure Messaging, Message Your Care Team.            Jul 10, 2017  2:00 PM CDT   [facility] Visit with Sánchez Dias MD   [facility]    [address] [place] [id number] [phone]            2017  1:00 PM CDT   New Visit with Jeremiah Perdomo MD   [facility]    [address] [place] [address] [phone]              Who to contact     If you have questions or need follow up information about today's clinic visit or your schedule please contact [facility] directly at [phone].  Normal or non-critical lab and imaging results will be communicated to you by MyChart, letter or phone within 4 business days after the clinic has received the results. If you do not hear from us within 7 days, please contact the clinic through [employer] or phone. If you have a critical or abnormal lab result, we will notify you by phone as soon as possible.  Submit refill requests through [employer] or call your pharmacy and they will forward the refill request to us. Please allow 3 business days for your refill to be completed.          Additional Information About Your Visit        [employer] Information     [employer] lets you send messages to your doctor, view your test results, renew your prescriptions, schedule appointments and more. To sign up, go to [email or web address] . Click on \"Log in\" on the left side of the screen, which will take you to the Welcome page. Then click on \"Sign up Now\" on the right side of the page.     You will be asked to enter the access code listed below, as well as some personal information. Please follow the directions to create your username and password.     Your access code is: [email or web address]  Expires: 2017  9:32 AM     Your access code will  in 90 days. " "If you need help or a new code, please call your [place] clinic or [phone].        Care EveryWhere ID     This is your Care EveryWhere ID. This could be used by other organizations to access your [place] medical records  [id number]        Your Vitals Were     Pulse Temperature Height BMI (Body Mass Index)          88 97.3  F (36.3  C) (Tympanic) 5' 4.5\" (1.638 m) 31.84 kg/m2         Blood Pressure from Last 3 Encounters:   06/15/17 128/64   06/07/17 120/64   05/17/17 130/68    Weight from Last 3 Encounters:   06/15/17 188 lb 6.4 oz (85.5 kg)   06/07/17 188 lb 8 oz (85.5 kg)   05/17/17 186 lb 8 oz (84.6 kg)              We Performed the Following     Alkaline phosphatase     ALT     AST     Basic metabolic panel     Hemoglobin A1c     HPV High Risk Types DNA Cervical     Lipid panel reflex to direct LDL     OB/GYN REFERRAL     Pap imaged thin layer screen with HPV - recommended age 30 - 65 years (select HPV order below)     Wet prep          Today's Medication Changes          These changes are accurate as of: 6/15/17  9:17 AM.  If you have any questions, ask your nurse or doctor.               These medicines have changed or have updated prescriptions.        Dose/Directions    glipiZIDE 5 MG tablet   Commonly known as:  GLUCOTROL   This may have changed:    - how much to take  - how to take this  - additional instructions   Used for:  Type 2 diabetes mellitus with stage 3 chronic kidney disease, without long-term current use of insulin (H)        Take 2 tablets AM and 1 tablet PM before meals   Quantity:  45 tablet   Refills:  1                Primary Care Provider Office Phone # Fax #    VERONIQUE Bledsoe -[phone]       [facility] [address] [facility] [address]        Thank you!     Thank you for choosing [facility]  for your care. Our goal is always to provide you with excellent care. Hearing back from our patients is one way we can " continue to improve our services. Please take a few minutes to complete the written survey that you may receive in the mail after your visit with us. Thank you!             Your Updated Medication List - Protect others around you: Learn how to safely use, store and throw away your medicines at www.disposemymeds.org.          This list is accurate as of: 6/15/17  9:17 AM.  Always use your most recent med list.                   Brand Name Dispense Instructions for use    ABILIFY 30 MG tablet   Generic drug:  ARIPiprazole      Take 30 mg by mouth daily       amLODIPine 5 MG tablet    NORVASC    90 tablet    Take 1 tablet (5 mg) by mouth daily       blood glucose lancets standard    no brand specified    1    needs lancet        blood glucose monitoring test strip    no brand specified    100 each    100 strips by In Vitro route daily Use to test blood sugar 1 times daily  And as needed ACCU Check Haylee Plus Test Strips       chlorthalidone 25 MG tablet    HYGROTON    30 tablet    Take 0.5 tablets (12.5 mg) by mouth daily       Clozapine 200 MG tablet    CLOZARIL     Take 200 mg by mouth 2 times daily       FLUoxetine 20 MG capsule    PROzac     Take 20 mg by mouth daily       glipiZIDE 5 MG tablet    GLUCOTROL    45 tablet    Take 2 tablets AM and 1 tablet PM before meals       loratadine 10 MG tablet    CLARITIN    90 tablet    Take 1 tablet (10 mg) by mouth every morning       MELATONIN PO      Take 3 mg by mouth nightly as needed       montelukast 10 MG tablet    SINGULAIR    90 tablet    Take 1 tablet (10 mg) by mouth At Bedtime       naltrexone 50 MG tablet    DEPADE;REVIA     Take 50 mg by mouth every morning       omeprazole 20 MG CR capsule    priLOSEC    60 capsule    1 capsule bid       ranitidine 300 MG tablet    ZANTAC    90 tablet    Take 1 tablet (300 mg) by mouth At Bedtime       Vitamin D (Cholecalciferol) 1000 UNITS Tabs     90 tablet    Take 1 tablet by mouth daily
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
## 2017-06-15 NOTE — LETTER
"[facility]  [address] [facility] [address]  Phone: [phone]    June 16, 2017    Divina HOANG Leona  [address]  [address]  [facility] [address]          Dear Ms. Lopessimónclarence,    The results of your recent lab tests were:  diabetes is uncontrolled, a lot worse, recommend to increase Glipizide to 10 mg twice daily, sent new prescription. Try to follow diabetic diet, exercise more and try to loose weight. Will recheck diabetes in 3 months.   Cholesterol is elevated, recommend Mediterranean diet:   -a Mediterranean diet appears to reduce the risk of cardiovascular events. There is no single Mediterranean diet, but such diets are typically high in fruits, vegetables, whole grains, beans, nuts, and seeds and include olive oil as an important source of fat; there are typically low to moderate amounts of fish, poultry, and dairy products, and there is little red meat.   Exercise daily.   Add fiber supplement, certain soluble fibers, such as Metamucil, Psyllium will help to reduce LDL, or \"bad cholesterol\".   Eating nuts (Walnuts, hazelnuts and almonds can also help to lower triglycerides and LDL   Fish oil and omega 3 fatty acids can reduce triglycerides.   Liver function still abnormal, but better can be due to stopping Trazodone, Lipitor and Lithium, follow up with liver doctor.   Kidney function improved.   Electrolytes are normal.   Component      Latest Ref Rng & Units 6/15/2017   Sodium      133 - 144 mmol/L 138   Potassium      3.4 - 5.3 mmol/L 4.0   Chloride      94 - 109 mmol/L 106   Carbon Dioxide      20 - 32 mmol/L 23   Anion Gap      3 - 14 mmol/L 9   Glucose      70 - 99 mg/dL 168 (H)   Urea Nitrogen      7 - 30 mg/dL 23   Creatinine      0.52 - 1.04 mg/dL 1.35 (H)   GFR Estimate      >60 mL/min/1.7m2 46 (L)   GFR Estimate If Black      >60 mL/min/1.7m2 55 (L)   Calcium      8.5 - 10.1 mg/dL 9.1   Cholesterol      <200 mg/dL 300 (H)   Triglycerides      <150 mg/dL 179 (H) "   HDL Cholesterol      >49 mg/dL 72   LDL Cholesterol Calculated      <100 mg/dL 192 (H)   Non HDL Cholesterol      <130 mg/dL 228 (H)   Hemoglobin A1C      4.3 - 6.0 % 7.8 (H)   ALT      0 - 50 U/L 56 (H)   AST      0 - 45 U/L 48 (H)   Alkaline Phosphatase      40 - 150 U/L 179 (H)     If you have any further questions or problems, please contact our office.    Sincerely,      Jaleesa Velasquez NP  / libia

## 2017-06-15 NOTE — NURSING NOTE
"Chief Complaint   Patient presents with     Physical     Pap     Contraception     Would like to get on birth control.      Ear Problem     Right ear has been draining and throbbing.       Initial /64  Pulse 88  Temp 97.3  F (36.3  C) (Tympanic)  Ht 5' 4.5\" (1.638 m)  Wt 188 lb 6.4 oz (85.5 kg)  BMI 31.84 kg/m2 Estimated body mass index is 31.84 kg/(m^2) as calculated from the following:    Height as of this encounter: 5' 4.5\" (1.638 m).    Weight as of this encounter: 188 lb 6.4 oz (85.5 kg).  Medication Reconciliation: complete  "

## 2017-06-15 NOTE — LETTER
June 21, 2017      Divina Delgadillo  [address] [address] [place] [address]  [address] [address]    Dear ,      This letter is in regards to the PAP smear and HPV (Human Papillomavirus) test you had done recently. Your PAP test result is normal, but your HPV (Human Papillomavirus) test was positive.     About 80 percent of women have been exposed to HPV virus throughout their lifetime. There is no medication for the treatment of HPV. Typically your own immune system gets rid of the virus before it does harm. HPV is spread by direct skin-to-skin contact, including sexual intercourse, oral sex, anal sex, or any other contact involving the genital area (example: hand to genital contact). It is not possible to become infected with HPV by touching an object, such as a toilet seat. Most people who are infected with HPV have no signs or symptoms.    Things that you can do to boost your immune system and help your body get rid of HPV: get plenty of rest, eat a well-balanced diet of healthy foods, and stop smoking.     Please return in 1 year to repeat your pap smear and HPV test.     If you have additional questions regarding this result, please call [phone].    Sincerely,      VERONIQUE Spears CNP/rlm

## 2017-06-15 NOTE — PATIENT INSTRUCTIONS
Preventive Health Recommendations  Female Ages 26 - 39  Yearly exam:   See your health care provider every year in order to    Review health changes.     Discuss preventive care.      Review your medicines if you your doctor has prescribed any.    Until age 30: Get a Pap test every three years (more often if you have had an abnormal result).    After age 30: Talk to your doctor about whether you should have a Pap test every 3 years or have a Pap test with HPV screening every 5 years.   You do not need a Pap test if your uterus was removed (hysterectomy) and you have not had cancer.  You should be tested each year for STDs (sexually transmitted diseases), if you're at risk.   Talk to your provider about how often to have your cholesterol checked.  If you are at risk for diabetes, you should have a diabetes test (fasting glucose).  Shots: Get a flu shot each year. Get a tetanus shot every 10 years.   Nutrition:     Eat at least 5 servings of fruits and vegetables each day.    Eat whole-grain bread, whole-wheat pasta and brown rice instead of white grains and rice.    Talk to your provider about Calcium and Vitamin D.     Lifestyle    Exercise at least 150 minutes a week (30 minutes a day, 5 days of the week). This will help you control your weight and prevent disease.    Limit alcohol to one drink per day.    No smoking.     Wear sunscreen to prevent skin cancer.    See your dentist every six months for an exam and cleaning.  Schedule GYN appointment for IUD consult  Labs:  Liver function recheck  A1C  BMP  WET prep  Pap test

## 2017-06-16 RX ORDER — GLIPIZIDE 10 MG/1
10 TABLET ORAL
Qty: 60 TABLET | Refills: 2 | Status: SHIPPED | OUTPATIENT
Start: 2017-06-16 | End: 2017-08-21

## 2017-06-19 DIAGNOSIS — N18.30 CKD (CHRONIC KIDNEY DISEASE) STAGE 3, GFR 30-59 ML/MIN (H): ICD-10-CM

## 2017-06-19 DIAGNOSIS — N18.30 CKD (CHRONIC KIDNEY DISEASE) STAGE 3, GFR 30-59 ML/MIN (H): Primary | ICD-10-CM

## 2017-06-19 LAB
ALBUMIN SERPL-MCNC: 3.3 G/DL (ref 3.4–5)
ANION GAP SERPL CALCULATED.3IONS-SCNC: 7 MMOL/L (ref 3–14)
BUN SERPL-MCNC: 26 MG/DL (ref 7–30)
CALCIUM SERPL-MCNC: 9.4 MG/DL (ref 8.5–10.1)
CHLORIDE SERPL-SCNC: 107 MMOL/L (ref 94–109)
CO2 SERPL-SCNC: 26 MMOL/L (ref 20–32)
COPATH REPORT: NORMAL
CREAT SERPL-MCNC: 1.46 MG/DL (ref 0.52–1.04)
CREAT UR-MCNC: 134 MG/DL
GFR SERPL CREATININE-BSD FRML MDRD: 42 ML/MIN/1.7M2
GLUCOSE SERPL-MCNC: 176 MG/DL (ref 70–99)
HGB BLD-MCNC: 11.5 G/DL (ref 11.7–15.7)
PAP: NORMAL
PHOSPHATE SERPL-MCNC: 3.3 MG/DL (ref 2.5–4.5)
POTASSIUM SERPL-SCNC: 4.1 MMOL/L (ref 3.4–5.3)
PROT UR-MCNC: 0.35 G/L
PROT/CREAT 24H UR: 0.26 G/G CR (ref 0–0.2)
SODIUM SERPL-SCNC: 140 MMOL/L (ref 133–144)

## 2017-06-19 PROCEDURE — 80069 RENAL FUNCTION PANEL: CPT | Performed by: INTERNAL MEDICINE

## 2017-06-19 PROCEDURE — 85018 HEMOGLOBIN: CPT | Performed by: INTERNAL MEDICINE

## 2017-06-19 PROCEDURE — 84156 ASSAY OF PROTEIN URINE: CPT | Performed by: INTERNAL MEDICINE

## 2017-06-19 PROCEDURE — 83970 ASSAY OF PARATHORMONE: CPT | Performed by: INTERNAL MEDICINE

## 2017-06-19 PROCEDURE — 36415 COLL VENOUS BLD VENIPUNCTURE: CPT | Performed by: INTERNAL MEDICINE

## 2017-06-20 ENCOUNTER — RESULT FOLLOW UP (OUTPATIENT)
Dept: FAMILY MEDICINE | Facility: CLINIC | Age: 32
End: 2017-06-20

## 2017-06-20 ENCOUNTER — ANTICOAGULATION THERAPY VISIT (OUTPATIENT)
Dept: ANTICOAGULATION | Facility: CLINIC | Age: 32
End: 2017-06-20

## 2017-06-20 ENCOUNTER — TELEPHONE (OUTPATIENT)
Dept: NEPHROLOGY | Facility: CLINIC | Age: 32
End: 2017-06-20

## 2017-06-20 DIAGNOSIS — R87.810 CERVICAL HIGH RISK HPV (HUMAN PAPILLOMAVIRUS) TEST POSITIVE: ICD-10-CM

## 2017-06-20 DIAGNOSIS — I82.409 DVT (DEEP VENOUS THROMBOSIS) (H): ICD-10-CM

## 2017-06-20 LAB
FINAL DIAGNOSIS: ABNORMAL
HPV HR 12 DNA CVX QL NAA+PROBE: POSITIVE
HPV16 DNA SPEC QL NAA+PROBE: NEGATIVE
HPV18 DNA SPEC QL NAA+PROBE: NEGATIVE
PTH-INTACT SERPL-MCNC: 35 PG/ML (ref 12–72)
SPECIMEN DESCRIPTION: ABNORMAL

## 2017-06-20 NOTE — PROGRESS NOTES
"6/15/2017 Pap:NIL, +HR HPV \"other\" (not 16/18). Plan to repeat Pap+HPV in 1 year  6/1/18 [facility] reminder letter sent (rlm)  6/14/2018 Pap: NIL/neg HPV. Plan Pap+HPV in 3 years (2021). Tracking closed/ck  "

## 2017-06-20 NOTE — MR AVS SNAPSHOT
Divina Delgadillo   6/20/2017   Anticoagulation Therapy Visit    Description:  32 year old female   Provider:  Myrna Wiseman, RN   Department:  Cl Anticoag           INR as of 6/20/2017     Today's INR No new INR was available at the time of this encounter.      Anticoagulation Summary as of 6/20/2017     INR goal 2.0-3.0   Today's INR No new INR was available at the time of this encounter.   Full instructions 2 mg on Tue, Fri; 4 mg all other days   Next INR check     Indications   DVT (deep venous thrombosis) (H) [I82.409]         Anticoagulation Episode Summary     Resolved date 6/20/2017    Resolved reason Therapy  Complete

## 2017-06-20 NOTE — LETTER
"June 1, 2018      Divina HOANG Leona  [address] [address] [place] [id number]    Dear ,      At [facility], your health and wellness is our primary concern. That is why we are following up on a positive high risk HPV test from 6/15/17, which was reported as HPV type \"other\". Your provider had recommended that you have a Pap smear and HPV test completed by 6/15/18. Our records do not show that this has been scheduled.    It is important to complete the follow up that your provider has suggested for you to ensure that there are no worsening changes which may, over time, develop into cancer.      Please contact our office at  [phone] to schedule an appointment for a Pap smear and HPV test at your earliest convenience. If you have questions or concerns, please call the clinic and we will be happy to assist you.    If you have completed the tests outside of [facility], please have the results forwarded to our office. We will update the chart for your primary Physician to review before your next annual physical.     Thank you for choosing [facility]!    Sincerely,      VERONIQUE Spears CNP/rlm    "

## 2017-06-20 NOTE — TELEPHONE ENCOUNTER
Called to speak to Lorin in regards to patients medication Lithium. Lorin confirmed that the patient is no longer taken this medication after last visit with Dr. Dias. I confirmed this with Dr. Dias, patient was to see their Psychiatrist before stopping this medication. Dr. Dias explained further instructions over the phone to Lorin.    Brandon Ryder Medical Assistant

## 2017-06-23 DIAGNOSIS — N18.30 CKD (CHRONIC KIDNEY DISEASE) STAGE 3, GFR 30-59 ML/MIN (H): Primary | ICD-10-CM

## 2017-07-10 ENCOUNTER — OFFICE VISIT (OUTPATIENT)
Dept: NEPHROLOGY | Facility: CLINIC | Age: 32
End: 2017-07-10
Payer: MEDICARE

## 2017-07-10 VITALS
WEIGHT: 186.1 LBS | TEMPERATURE: 97.8 F | DIASTOLIC BLOOD PRESSURE: 60 MMHG | BODY MASS INDEX: 31.45 KG/M2 | SYSTOLIC BLOOD PRESSURE: 129 MMHG | OXYGEN SATURATION: 99 % | HEART RATE: 93 BPM

## 2017-07-10 DIAGNOSIS — F31.9 BIPOLAR AFFECTIVE DISORDER, REMISSION STATUS UNSPECIFIED (H): Primary | ICD-10-CM

## 2017-07-10 DIAGNOSIS — I10 ESSENTIAL HYPERTENSION: ICD-10-CM

## 2017-07-10 DIAGNOSIS — N18.30 TYPE 2 DIABETES MELLITUS WITH STAGE 3 CHRONIC KIDNEY DISEASE, WITHOUT LONG-TERM CURRENT USE OF INSULIN (H): ICD-10-CM

## 2017-07-10 DIAGNOSIS — N18.30 CKD (CHRONIC KIDNEY DISEASE) STAGE 3, GFR 30-59 ML/MIN (H): ICD-10-CM

## 2017-07-10 DIAGNOSIS — E11.22 TYPE 2 DIABETES MELLITUS WITH STAGE 3 CHRONIC KIDNEY DISEASE, WITHOUT LONG-TERM CURRENT USE OF INSULIN (H): ICD-10-CM

## 2017-07-10 PROCEDURE — 99214 OFFICE O/P EST MOD 30 MIN: CPT | Performed by: INTERNAL MEDICINE

## 2017-07-10 NOTE — MR AVS SNAPSHOT
After Visit Summary   7/10/2017    Divina Delgadillo    MRN: [id number]           Patient Information     Date Of Birth          1985        Visit Information        Provider Department      7/10/2017 2:00 PM Sánchez Dias MD [facility]        Today's Diagnoses     Type 2 diabetes mellitus with stage 3 chronic kidney disease, without long-term current use of insulin (H)    -  1    CKD (chronic kidney disease) stage 3, GFR 30-59 ml/min          Care Instructions    1. Attempt to decrease omeprazole to once daily. If you begin to have heart burn symptoms, increase back to twice a day  2. Nutrition referral in [place]  3. Labs next week as you have planned  4. Labs every 2 months  5. Follow-up in 6 months          Follow-ups after your visit        Additional Services     NUTRITION REFERRAL       Your provider has referred you to: [facility]: [facility] [phone]   [email or web address]    Please be aware that coverage of these services is subject to the terms and limitations of your health insurance plan.  Call member services at your health plan with any benefit or coverage questions.      Please bring the following with you to your appointment:    (1) This referral request  (2) Any documents given to you regarding this referral  (3) Any specific questions you have about diet and/or food choices                  Your next 10 appointments already scheduled     Jul 14, 2017  1:30 PM CDT   [facility] Althea [facility] MD   [facility]    [address] [address] [phone]           Bring a current list of meds and any records pertaining to this visit.  For Physicals, please bring immunization records and any forms needing to be filled out.  Please arrive 10 minutes early to complete paperwork.            Jul 27, 2017  1:00 PM CDT   [facility] with Jeremiah  MD [facility]    [address] [facility] [address] [phone]            Jan 09, 2018  9:30 AM CST   LAB with LAB FIRST FLOOR [facility] ([facility])    [address] [address] [phone]           Patient must bring picture ID.  Patient should be prepared to give a urine specimen  Please do not eat 10-12 hours before your appointment if you are coming in fasting for labs on lipids, cholesterol, or glucose (sugar).  Pregnant women should follow their Care Team instructions. Water with medications is okay. Do not drink coffee or other fluids.   If you have concerns about taking  your medications, please ask at office or if scheduling via [employer], send a message by clicking on Secure Messaging, Message Your Care Team.            Jan 09, 2018 10:00 AM CST   [facility] with S[facility]as [facility]   [facility]    [address] [facility] [address] [phone]              Future tests that were ordered for you today     Open Standing Orders        Priority Remaining Interval Expires Ordered    Renal panel Routine 2/2 q 2 months 7/10/2018 7/10/2017          Open Future Orders        Priority Expected Expires Ordered    Protein  random urine Routine 7/17/2017 7/10/2018 7/10/2017    Albumin Random Urine Quantitative Routine 7/17/2017 7/10/2018 7/10/2017    UA with Microscopic reflex to Culture Routine 7/17/2017 7/10/2018 7/10/2017            Who to contact     If you have questions or need follow up information about today's clinic visit or your schedule please contact [facility] directly at [phone].  Normal or non-critical lab and imaging results will be communicated to you by MyChart, letter or phone within 4 business days after the clinic has received the results. If you do not hear from  us within 7 days, please contact the clinic through [employer] or phone. If you have a critical or abnormal lab result, we will notify you by phone as soon as possible.  Submit refill requests through [employer] or call your pharmacy and they will forward the refill request to us. Please allow 3 business days for your refill to be completed.          Additional Information About Your Visit        [employer] Information     [employer] is an electronic gateway that provides easy, online access to your medical records. With [employer], you can request a clinic appointment, read your test results, renew a prescription or communicate with your care team.     To sign up for [employer] visit the website at [email or web address]   You will be asked to enter the access code listed below, as well as some personal information. Please follow the directions to create your username and password.     Your access code is: [email or web address]  Expires: 2017  9:32 AM     Your access code will  in 90 days. If you need help or a new code, please contact your [facility] or call [phone] for assistance.        Care EveryWhere ID     This is your Care EveryWhere ID. This could be used by other organizations to access your [place] medical records  [id number]        Your Vitals Were     Pulse Temperature Pulse Oximetry BMI (Body Mass Index)          93 97.8  F (36.6  C) (Oral) 99% 31.45 kg/m2         Blood Pressure from Last 3 Encounters:   07/10/17 129/60   06/15/17 128/64   17 120/64    Weight from Last 3 Encounters:   07/10/17 84.4 kg (186 lb 1.6 oz)   06/15/17 85.5 kg (188 lb 6.4 oz)   17 85.5 kg (188 lb 8 oz)              We Performed the Following     NUTRITION REFERRAL        Primary Care Provider Office Phone # Fax #    VERONIQUE Bledsoe [phone]       [facility] [address] [address]        Equal Access to Services     FANG HAY  AH: Hadii juan manuel serranoyenniferlogan Pamela, waaxda luqadaha, qaybta kasara dhillon, aaron lizzyin hayaapadmini bequang sy laViviennetello munoz. So [facility] [phone].    ATENCIÓN: Si habla español, tiene a gil disposición servicios gratuitos de asistencia lingüística. Llame al [phone].    We comply with applicable federal civil rights laws and [place] laws. We do not discriminate on the basis of race, color, national origin, age, disability sex, sexual orientation or gender identity.            Thank you!     Thank you for choosing [facility] [[email or web address]]  for your care. Our goal is always to provide you with excellent care. Hearing back from our patients is one way we can continue to improve our services. Please take a few minutes to complete the written survey that you may receive in the mail after your visit with us. Thank you!             Your Updated Medication List - Protect others around you: Learn how to safely use, store and throw away your medicines at www.disposemymeds.org.          This list is accurate as of: 7/10/17  2:50 PM.  Always use your most recent med list.                   Brand Name Dispense Instructions for use Diagnosis    ABILIFY 30 MG tablet   Generic drug:  ARIPiprazole      Take 30 mg by mouth daily        amLODIPine 5 MG tablet    NORVASC    90 tablet    Take 1 tablet (5 mg) by mouth daily    Essential hypertension       blood glucose lancets standard    no brand specified    1    needs lancet     Type II or unspecified type diabetes mellitus without mention of complication, not stated as uncontrolled, Mild persistent asthma       blood glucose monitoring test strip    no brand specified    100 each    100 strips by In Vitro route daily Use to test blood sugar 1 times daily  And as needed ACCU Check Haylee Plus Test Strips    Type 2 diabetes mellitus with stage 3 chronic kidney disease, without long-term current use of insulin (H)       chlorthalidone 25 MG tablet    HYGROTON    30  tablet    Take 0.5 tablets (12.5 mg) by mouth daily    Essential hypertension       Clozapine 200 MG tablet    CLOZARIL     Take 200 mg by mouth 2 times daily        FLUoxetine 20 MG capsule    PROzac     Take 20 mg by mouth daily        glipiZIDE 10 MG tablet    GLUCOTROL    60 tablet    Take 1 tablet (10 mg) by mouth 2 times daily (before meals)    Type 2 diabetes mellitus with stage 3 chronic kidney disease, without long-term current use of insulin (H)       loratadine 10 MG tablet    CLARITIN    90 tablet    Take 1 tablet (10 mg) by mouth every morning    Seasonal allergic rhinitis, unspecified allergic rhinitis trigger       MELATONIN PO      Take 3 mg by mouth nightly as needed        metroNIDAZOLE 500 MG tablet    FLAGYL    14 tablet    Take 1 tablet (500 mg) by mouth 2 times daily    Bacterial vaginitis       montelukast 10 MG tablet    SINGULAIR    90 tablet    Take 1 tablet (10 mg) by mouth At Bedtime    Seasonal allergic rhinitis, unspecified allergic rhinitis trigger       naltrexone 50 MG tablet    DEPADE;REVIA     Take 50 mg by mouth every morning        omeprazole 20 MG CR capsule    priLOSEC    60 capsule    1 capsule bid    Gastroesophageal reflux disease without esophagitis       ranitidine 300 MG tablet    ZANTAC    90 tablet    Take 1 tablet (300 mg) by mouth At Bedtime    Gastroesophageal reflux disease without esophagitis       Vitamin D (Cholecalciferol) 1000 UNITS Tabs     90 tablet    Take 1 tablet by mouth daily    Vitamin D deficiency

## 2017-07-10 NOTE — NURSING NOTE
"Divina Delgadillo's goals for this visit include: CC  She requests these members of her care team be copied on today's visit information: No    PCP: Jaleesa Velasquez    Referring Provider:  ESTABLISHED PATIENT  No address on file    Chief Complaint   Patient presents with     RECHECK       Initial There were no vitals taken for this visit. Estimated body mass index is 31.84 kg/(m^2) as calculated from the following:    Height as of 6/15/17: 1.638 m (5' 4.5\").    Weight as of 6/15/17: 85.5 kg (188 lb 6.4 oz).  Medication Reconciliation: complete  "

## 2017-07-10 NOTE — PATIENT INSTRUCTIONS
1. Attempt to decrease omeprazole to once daily. If you begin to have heart burn symptoms, increase back to twice a day  2. Nutrition referral in [place]  3. Labs next week as you have planned  4. Labs every 2 months  5. Follow-up in 6 months

## 2017-07-12 ENCOUNTER — PRE VISIT (OUTPATIENT)
Dept: GASTROENTEROLOGY | Facility: CLINIC | Age: 32
End: 2017-07-12

## 2017-07-12 NOTE — TELEPHONE ENCOUNTER
PREVISIT INFORMATION                                                    Divina Delgadillo scheduled for future visit at [facility] specialty clinics.    Patient is scheduled to see Dr. Perdomo on 7/27/17  Reason for visit: abnormal liver function studies   Referring provider: Jaleesa Velasquez  Has patient seen previous specialist? Not within the [facility] or [facility]  Medical Records:  Available in chart.  Patient was previously seen at a [facility] or [facility] facility.     REVIEW                                                      New patient packet mailed to patient: N/A  Medication reconciliation complete: No      PLAN/FOLLOW-UP NEEDED                                                      Patient Reminders Given:    Informed patient to bring an updated list of allergies, medications, pharmacy details and insurance information. Directed patient to come to the 2nd floor, check-in #4 for their appointment. Informed patient to call back if appointment needs to be cancelled or rescheduled at [phone].    Reminded patient to bring any outside records regarding this appointment or have them faxed to clinic at [phone].

## 2017-07-12 NOTE — TELEPHONE ENCOUNTER
Lorin returned the call doesn't think the patient has sen GI or Hepatology in the past. She was previously seen through [facility] and all of those records were sent and scanned into the chart on 3/7/16.    Juliana Flores CMA

## 2017-07-14 ENCOUNTER — OFFICE VISIT (OUTPATIENT)
Dept: OBGYN | Facility: CLINIC | Age: 32
End: 2017-07-14
Payer: MEDICARE

## 2017-07-14 VITALS
TEMPERATURE: 97.2 F | HEART RATE: 98 BPM | BODY MASS INDEX: 31.22 KG/M2 | DIASTOLIC BLOOD PRESSURE: 66 MMHG | HEIGHT: 65 IN | SYSTOLIC BLOOD PRESSURE: 133 MMHG | WEIGHT: 187.4 LBS

## 2017-07-14 DIAGNOSIS — Z30.09 ENCOUNTER FOR COUNSELING REGARDING CONTRACEPTION: Primary | ICD-10-CM

## 2017-07-14 LAB — BETA HCG QUAL IFA URINE: NEGATIVE

## 2017-07-14 PROCEDURE — 99202 OFFICE O/P NEW SF 15 MIN: CPT | Performed by: OBSTETRICS & GYNECOLOGY

## 2017-07-14 PROCEDURE — 84703 CHORIONIC GONADOTROPIN ASSAY: CPT | Performed by: OBSTETRICS & GYNECOLOGY

## 2017-07-14 NOTE — PROGRESS NOTES
"Divina is a 32 year old No obstetric history on file.  female who presents for advice regarding BC options; she is  with no intention of pursuing pregnancy in the future.  She was previously on BCP, Ocella, but this was discontinued when she developed a DVT; she also has h/o DM 2, CKD and pancreatic cancer (2 yrs)  Her menses are regular, last 3-4 days, heavy day 1 since off BCP  She is prone to anxiety with procedures  Her Pap was NIL with pos HRHPV.    Patient Active Problem List    Diagnosis Date Noted     Cervical high risk HPV (human papillomavirus) test positive 2017     Priority: Medium     6/15/2017 Pap:NIL, +HR HPV \"other\" (not 16/18). Plan to repeat Pap+HPV in 1 year       DVT (deep venous thrombosis) (H) 2017     Priority: Medium     CKD (chronic kidney disease) stage 3, GFR 30-59 ml/min 2017     Priority: Medium     Malignant neoplasm of pancreas, unspecified location of malignancy (H) 2017     Priority: Medium     Type 2 diabetes mellitus with stage 3 chronic kidney disease, without long-term current use of insulin (H) 2017     Priority: Medium     Bipolar disorder (H) 2017     Priority: Medium     HYPERLIPIDEMIA LDL GOAL <100 10/31/2010     Priority: Medium     HTN (hypertension) 2008     Priority: Medium     (Problem list name updated by automated process. Provider to review and confirm.)       non alcoholic fatty liver disease 2006     Priority: Medium     See flowsheet for hepatic panel.   Stopped zocor, was on godon as well had right upper quandrant ultrasound and ct  ? Psych med related vs SAHNI       Esophageal reflux 2005     Priority: Medium     GERD         All systems were reviewed and pertinent information in noted in subjective/HPI.    Past Medical History:   Diagnosis Date     Cervical high risk HPV (human papillomavirus) test positive 2017     Depressive disorder, not elsewhere classified      DVT (deep venous thrombosis) (H)  "     Dysmetabolic syndrome X      Esophageal reflux     GERD     Mild intermittent asthma      Other abnormal heart sounds     Heart murmur     Other specified types of schizophrenia, unspecified condition      Pancreatic cancer (H)     left adrenal gland, partial pancreas, and spleen removed; no chemo or radiation.      Type II or unspecified type diabetes mellitus without mention of complication, not stated as uncontrolled      Unspecified disorder of tympanic membrane        Past Surgical History:   Procedure Laterality Date     ADRENALECTOMY       PANCREATECTOMY PARTIAL       SPLENECTOMY       SURGICAL HISTORY OF -   10/1/2000    Tympanoplasty     SURGICAL HISTORY OF -   10/1/2000    Tonsillectomy         Current Outpatient Prescriptions:      glipiZIDE (GLUCOTROL) 10 MG tablet, Take 1 tablet (10 mg) by mouth 2 times daily (before meals), Disp: 60 tablet, Rfl: 2     MELATONIN PO, Take 3 mg by mouth nightly as needed, Disp: , Rfl:      chlorthalidone (HYGROTON) 25 MG tablet, Take 0.5 tablets (12.5 mg) by mouth daily, Disp: 30 tablet, Rfl: 1     Vitamin D, Cholecalciferol, 1000 UNITS TABS, Take 1 tablet by mouth daily, Disp: 90 tablet, Rfl: 3     amLODIPine (NORVASC) 5 MG tablet, Take 1 tablet (5 mg) by mouth daily, Disp: 90 tablet, Rfl: 1     montelukast (SINGULAIR) 10 MG tablet, Take 1 tablet (10 mg) by mouth At Bedtime, Disp: 90 tablet, Rfl: 1     ranitidine (ZANTAC) 300 MG tablet, Take 1 tablet (300 mg) by mouth At Bedtime, Disp: 90 tablet, Rfl: 1     loratadine (CLARITIN) 10 MG tablet, Take 1 tablet (10 mg) by mouth every morning, Disp: 90 tablet, Rfl: 1     omeprazole (PRILOSEC) 20 MG CR capsule, 1 capsule bid, Disp: 60 capsule, Rfl: 5     Clozapine (CLOZARIL) 200 MG tablet, Take 200 mg by mouth 2 times daily, Disp: , Rfl:      ARIPiprazole (ABILIFY) 30 MG tablet, Take 30 mg by mouth daily, Disp: , Rfl:      FLUoxetine (PROZAC) 20 MG capsule, Take 20 mg by mouth daily, Disp: , Rfl:      naltrexone  "(DEPADE;REVIA) 50 MG tablet, Take 50 mg by mouth every morning, Disp: , Rfl:      metroNIDAZOLE (FLAGYL) 500 MG tablet, Take 1 tablet (500 mg) by mouth 2 times daily (Patient not taking: Reported on 7/10/2017), Disp: 14 tablet, Rfl: 0     blood glucose monitoring (NO BRAND SPECIFIED) test strip, 100 strips by In Vitro route daily Use to test blood sugar 1 times daily  And as needed ACCU Check Haylee Plus Test Strips, Disp: 100 each, Rfl: 1     LANCETS MISC. KIT, needs lancet  , Disp: 1, Rfl: 0    ALLERGIES:  Latex and Tylenol [tylenol]    Social History     Social History     Marital status: Single     Spouse name: N/A     Number of children: 0     Years of education: N/A     Social History Main Topics     Smoking status: Current Every Day Smoker     Packs/day: 1.00     Years: 4.00     Types: Cigarettes     Last attempt to quit: 6/20/2008     Smokeless tobacco: None      Comment: 1 packe every other day      Alcohol use No     Drug use: No     Sexual activity: Not Currently     Partners: Male     Other Topics Concern     None     Social History Narrative       Family History   Problem Relation Age of Onset     Respiratory Mother      ASTHMA     Allergies Mother      Depression Mother      HEART DISEASE Father      HEART VALVE REPLACEMENT     Hypertension Father      DIABETES Father      Depression Father      Respiratory Brother      Allergies Brother      Respiratory Sister      Allergies Sister      Depression Sister      Respiratory Sister      Allergies Sister      Depression Sister      KIDNEY DISEASE No family hx of        OBJECTIVE:  Vitals: /66 (BP Location: Right arm, Patient Position: Chair, Cuff Size: Adult Large)  Pulse 98  Temp 97.2  F (36.2  C) (Oral)  Ht 5' 4.5\" (1.638 m)  Wt 187 lb 6.4 oz (85 kg)  LMP 07/03/2017  Breastfeeding? No  BMI 31.67 kg/m2 BMI= Body mass index is 31.67 kg/(m^2).   Patient's last menstrual period was 07/03/2017.     GENERAL APPEARANCE: healthy, alert " and no distress    ASSESSMENT:      ICD-10-CM    1. Birth control Z30.9 Beta HCG qual IFA urine       PLAN:  After discussing progesterone only options, Nexplanon, POP and LARC (as well as touching on permanent option of BILATERAL TUBAL STERILIZATION), Divina would like a Mirena IUD due to its contraceptive benefits and positive effects on menses (light or absent)  She would like to schedule in the future with premeds due her anxiety and nulliparous status; she understands she will need a ride home to follow  Mattie Roberst    Duration of visit:  25 minutes, 100% in discussion of current issues, treatment options and treatment planning.  MATTIE Roberts MD

## 2017-07-14 NOTE — NURSING NOTE
"Chief Complaint   Patient presents with     Consult     birth control options       Initial /66 (BP Location: Right arm, Patient Position: Chair, Cuff Size: Adult Large)  Pulse 98  Temp 97.2  F (36.2  C) (Oral)  Ht 5' 4.5\" (1.638 m)  Wt 187 lb 6.4 oz (85 kg)  LMP 07/03/2017  Breastfeeding? No  BMI 31.67 kg/m2 Estimated body mass index is 31.67 kg/(m^2) as calculated from the following:    Height as of this encounter: 5' 4.5\" (1.638 m).    Weight as of this encounter: 187 lb 6.4 oz (85 kg).  Medication Reconciliation: complete   Jenny Rodriguez, DIEGO      "

## 2017-07-14 NOTE — MR AVS SNAPSHOT
After Visit Summary   7/14/2017    Divina Delgadillo    MRN: [id number]           Patient Information     Date Of Birth          1985        Visit Information        Provider Department      7/14/2017 1:30 PM Althea Roberts MD [facility]        Today's Diagnoses     Encounter for counseling regarding contraception    -  1       Follow-ups after your visit        Your next 10 appointments already scheduled     Jul 27, 2017  1:00 PM CDT   New Visit with Jeremiah Perdomo MD   [facility])    [address] [place] [address] [id number] [phone]            Jan 09, 2018  9:30 AM CST   LAB with LAB FIRST FLOOR [facility])    [address] [place] [address] [id number] [phone]           Patient must bring picture ID.  Patient should be prepared to give a urine specimen  Please do not eat 10-12 hours before your appointment if you are coming in fasting for labs on lipids, cholesterol, or glucose (sugar).  Pregnant women should follow their Care Team instructions. Water with medications is okay. Do not drink coffee or other fluids.   If you have concerns about taking  your medications, please ask at office or if scheduling via [employer], send a message by clicking on Secure Messaging, Message Your Care Team.            Jan 09, 2018 10:00 AM CST   Return Visit with Sánchez Dias MD   [facility])    [address] [place] [address] [phone] [phone]              Who to contact     If you have questions or need follow up information about today's clinic visit or your schedule please contact [facility] directly at [phone].  Normal or non-critical lab and imaging results will be communicated to you by MyChart, letter or phone within 4 business days after the clinic has received the  "results. If you do not hear from us within 7 days, please contact the clinic through [employer] or phone. If you have a critical or abnormal lab result, we will notify you by phone as soon as possible.  Submit refill requests through [employer] or call your pharmacy and they will forward the refill request to us. Please allow 3 business days for your refill to be completed.          Additional Information About Your Visit        [employer] Information     [employer] lets you send messages to your doctor, view your test results, renew your prescriptions, schedule appointments and more. To sign up, go to [email or web address] . Click on \"Log in\" on the left side of the screen, which will take you to the Welcome page. Then click on \"Sign up Now\" on the right side of the page.     You will be asked to enter the access code listed below, as well as some personal information. Please follow the directions to create your username and password.     Your access code is: [email or web address]  Expires: 2017  9:32 AM     Your access code will  in 90 days. If you need help or a new code, please call your [facility] clinic or [phone].        Care EveryWhere ID     This is your Care EveryWhere ID. This could be used by other organizations to access your [facility] medical records  [id number]        Your Vitals Were     Pulse Temperature Height Last Period Breastfeeding? BMI (Body Mass Index)    98 97.2  F (36.2  C) (Oral) 5' 4.5\" (1.638 m) 2017 No 31.67 kg/m2       Blood Pressure from Last 3 Encounters:   17 133/66   07/10/17 129/60   06/15/17 128/64    Weight from Last 3 Encounters:   17 187 lb 6.4 oz (85 kg)   07/10/17 186 lb 1.6 oz (84.4 kg)   06/15/17 188 lb 6.4 oz (85.5 kg)              We Performed the Following     Beta HCG qual IFA urine        Primary Care Provider Office Phone # Fax #    Jaleesa VERONIQUE Tarango [phone]       [address] " [phone]        Equal Access to Services     [facility] : Hadii juan manuel moreno michelle Santiago, waemelinada luqdanny, qabuckta saravikibj dhillon, aaron munoz. So [facility] [phone].    ATENCIÓN: Si habla español, tiene a gil disposición servicios gratuitos de asistencia lingüística. Llame al [phone].    We comply with applicable federal civil rights laws and [place] laws. We do not discriminate on the basis of race, color, national origin, age, disability sex, sexual orientation or gender identity.            Thank you!     Thank you for choosing [facility]  for your care. Our goal is always to provide you with excellent care. Hearing back from our patients is one way we can continue to improve our services. Please take a few minutes to complete the written survey that you may receive in the mail after your visit with us. Thank you!             Your Updated Medication List - Protect others around you: Learn how to safely use, store and throw away your medicines at www.disposemymeds.org.          This list is accurate as of: 7/14/17  1:57 PM.  Always use your most recent med list.                   Brand Name Dispense Instructions for use Diagnosis    ABILIFY 30 MG tablet   Generic drug:  ARIPiprazole      Take 30 mg by mouth daily        amLODIPine 5 MG tablet    NORVASC    90 tablet    Take 1 tablet (5 mg) by mouth daily    Essential hypertension       blood glucose lancets standard    no brand specified    1    needs lancet     Type II or unspecified type diabetes mellitus without mention of complication, not stated as uncontrolled, Mild persistent asthma       blood glucose monitoring test strip    no brand specified    100 each    100 strips by In Vitro route daily Use to test blood sugar 1 times daily  And as needed ACCU Check Haylee Plus Test Strips    Type 2 diabetes mellitus with stage 3 chronic kidney disease, without long-term current use of insulin (H)        chlorthalidone 25 MG tablet    HYGROTON    30 tablet    Take 0.5 tablets (12.5 mg) by mouth daily    Essential hypertension       Clozapine 200 MG tablet    CLOZARIL     Take 200 mg by mouth 2 times daily        FLUoxetine 20 MG capsule    PROzac     Take 20 mg by mouth daily        glipiZIDE 10 MG tablet    GLUCOTROL    60 tablet    Take 1 tablet (10 mg) by mouth 2 times daily (before meals)    Type 2 diabetes mellitus with stage 3 chronic kidney disease, without long-term current use of insulin (H)       loratadine 10 MG tablet    CLARITIN    90 tablet    Take 1 tablet (10 mg) by mouth every morning    Seasonal allergic rhinitis, unspecified allergic rhinitis trigger       MELATONIN PO      Take 3 mg by mouth nightly as needed        metroNIDAZOLE 500 MG tablet    FLAGYL    14 tablet    Take 1 tablet (500 mg) by mouth 2 times daily    Bacterial vaginitis       montelukast 10 MG tablet    SINGULAIR    90 tablet    Take 1 tablet (10 mg) by mouth At Bedtime    Seasonal allergic rhinitis, unspecified allergic rhinitis trigger       naltrexone 50 MG tablet    DEPADE;REVIA     Take 50 mg by mouth every morning        omeprazole 20 MG CR capsule    priLOSEC    60 capsule    1 capsule bid    Gastroesophageal reflux disease without esophagitis       ranitidine 300 MG tablet    ZANTAC    90 tablet    Take 1 tablet (300 mg) by mouth At Bedtime    Gastroesophageal reflux disease without esophagitis       Vitamin D (Cholecalciferol) 1000 UNITS Tabs     90 tablet    Take 1 tablet by mouth daily    Vitamin D deficiency

## 2017-07-21 ENCOUNTER — CARE COORDINATION (OUTPATIENT)
Dept: CARE COORDINATION | Facility: CLINIC | Age: 32
End: 2017-07-21

## 2017-07-21 NOTE — PROGRESS NOTES
Clinic Care Coordination Contact  Care Team Conversations  D/I: Followed up with foster mother, Lorin, today. She had called with questions. We  discussed recent nephrology visit and orders placed at that visit. Lorin wanted clarification on change to Omeprazole. Went over the followin. Attempt to decrease omeprazole to once daily. If you begin to have heart burn symptoms, increase back to twice a day  2. Nutrition referral in [place]  3. Labs next week as you have planned  4. Labs every 2 months  5. Follow-up in 6 months        Lorin verbalized understanding of Omeprazole 1X/day unless heart burn increases. Reports they have decreased med as ordered with no increased heartburn. Discussed nutrition referral. Lorin has been waiting to be called about scheduling. Suggested she call to check. States they have been very busy but she will call if she doesn't hear something soon. She had no further questions/concerns.  P: RN CC will f/u in 1 month unless needs arise prior.    Jose De Jessu COTA,RN- BC  Clinic Care Coordinator  [facility]  Phone: [phone]

## 2017-07-24 DIAGNOSIS — N18.30 CKD (CHRONIC KIDNEY DISEASE) STAGE 3, GFR 30-59 ML/MIN (H): ICD-10-CM

## 2017-07-24 DIAGNOSIS — Z79.899 MEDICATION MANAGEMENT: ICD-10-CM

## 2017-07-24 LAB
ALBUMIN SERPL-MCNC: 3.1 G/DL (ref 3.4–5)
ANION GAP SERPL CALCULATED.3IONS-SCNC: 9 MMOL/L (ref 3–14)
BASOPHILS # BLD AUTO: 0.1 10E9/L (ref 0–0.2)
BASOPHILS NFR BLD AUTO: 0.8 %
BUN SERPL-MCNC: 23 MG/DL (ref 7–30)
CA-I SERPL ISE-MCNC: 4.8 MG/DL (ref 4.4–5.2)
CALCIUM SERPL-MCNC: 9.3 MG/DL (ref 8.5–10.1)
CHLORIDE SERPL-SCNC: 105 MMOL/L (ref 94–109)
CO2 SERPL-SCNC: 25 MMOL/L (ref 20–32)
CREAT SERPL-MCNC: 1.41 MG/DL (ref 0.52–1.04)
DEPRECATED CALCIDIOL+CALCIFEROL SERPL-MC: 58 UG/L (ref 20–75)
DIFFERENTIAL METHOD BLD: ABNORMAL
EOSINOPHIL # BLD AUTO: 0.4 10E9/L (ref 0–0.7)
EOSINOPHIL NFR BLD AUTO: 2.9 %
ERYTHROCYTE [DISTWIDTH] IN BLOOD BY AUTOMATED COUNT: 16.6 % (ref 10–15)
GFR SERPL CREATININE-BSD FRML MDRD: 43 ML/MIN/1.7M2
GLUCOSE SERPL-MCNC: 228 MG/DL (ref 70–99)
HCT VFR BLD AUTO: 35.7 % (ref 35–47)
HGB BLD-MCNC: 11 G/DL (ref 11.7–15.7)
LYMPHOCYTES # BLD AUTO: 5.2 10E9/L (ref 0.8–5.3)
LYMPHOCYTES NFR BLD AUTO: 39.2 %
MCH RBC QN AUTO: 27 PG (ref 26.5–33)
MCHC RBC AUTO-ENTMCNC: 30.8 G/DL (ref 31.5–36.5)
MCV RBC AUTO: 88 FL (ref 78–100)
MONOCYTES # BLD AUTO: 1 10E9/L (ref 0–1.3)
MONOCYTES NFR BLD AUTO: 7.5 %
NEUTROPHILS # BLD AUTO: 6.6 10E9/L (ref 1.6–8.3)
NEUTROPHILS NFR BLD AUTO: 49.6 %
PHOSPHATE SERPL-MCNC: 3 MG/DL (ref 2.5–4.5)
PLATELET # BLD AUTO: 487 10E9/L (ref 150–450)
POTASSIUM SERPL-SCNC: 3.9 MMOL/L (ref 3.4–5.3)
RBC # BLD AUTO: 4.08 10E12/L (ref 3.8–5.2)
SODIUM SERPL-SCNC: 139 MMOL/L (ref 133–144)
WBC # BLD AUTO: 13.3 10E9/L (ref 4–11)

## 2017-07-24 PROCEDURE — 82306 VITAMIN D 25 HYDROXY: CPT | Performed by: CLINICAL NURSE SPECIALIST

## 2017-07-24 PROCEDURE — 82330 ASSAY OF CALCIUM: CPT | Performed by: CLINICAL NURSE SPECIALIST

## 2017-07-24 PROCEDURE — 80069 RENAL FUNCTION PANEL: CPT | Performed by: CLINICAL NURSE SPECIALIST

## 2017-07-24 PROCEDURE — 85025 COMPLETE CBC W/AUTO DIFF WBC: CPT | Performed by: CLINICAL NURSE SPECIALIST

## 2017-07-24 PROCEDURE — 36415 COLL VENOUS BLD VENIPUNCTURE: CPT | Performed by: CLINICAL NURSE SPECIALIST

## 2017-07-26 ENCOUNTER — OFFICE VISIT (OUTPATIENT)
Dept: OBGYN | Facility: CLINIC | Age: 32
End: 2017-07-26
Payer: MEDICARE

## 2017-07-26 VITALS
HEIGHT: 65 IN | HEART RATE: 107 BPM | WEIGHT: 187 LBS | SYSTOLIC BLOOD PRESSURE: 129 MMHG | BODY MASS INDEX: 31.16 KG/M2 | DIASTOLIC BLOOD PRESSURE: 66 MMHG

## 2017-07-26 DIAGNOSIS — Z97.5 IUD (INTRAUTERINE DEVICE) IN PLACE: ICD-10-CM

## 2017-07-26 DIAGNOSIS — Z30.430 ENCOUNTER FOR IUD INSERTION: Primary | ICD-10-CM

## 2017-07-26 LAB — BETA HCG QUAL IFA URINE: NEGATIVE

## 2017-07-26 PROCEDURE — 96372 THER/PROPH/DIAG INJ SC/IM: CPT | Performed by: OBSTETRICS & GYNECOLOGY

## 2017-07-26 PROCEDURE — 84703 CHORIONIC GONADOTROPIN ASSAY: CPT | Performed by: OBSTETRICS & GYNECOLOGY

## 2017-07-26 PROCEDURE — 58300 INSERT INTRAUTERINE DEVICE: CPT | Performed by: OBSTETRICS & GYNECOLOGY

## 2017-07-26 RX ORDER — ONDANSETRON 8 MG/1
8 TABLET, FILM COATED ORAL ONCE
Qty: 1 TABLET | Refills: 0
Start: 2017-07-26 | End: 2017-07-26

## 2017-07-26 RX ORDER — ALPRAZOLAM 1 MG
1 TABLET ORAL ONCE
Qty: 1 TABLET | Refills: 0 | Status: SHIPPED | OUTPATIENT
Start: 2017-07-26 | End: 2017-07-26

## 2017-07-26 RX ORDER — KETOROLAC TROMETHAMINE 30 MG/ML
60 INJECTION, SOLUTION INTRAMUSCULAR; INTRAVENOUS ONCE
Qty: 2 ML | Refills: 0 | Status: SHIPPED | OUTPATIENT
Start: 2017-07-26 | End: 2017-07-26

## 2017-07-26 RX ORDER — DICYCLOMINE HYDROCHLORIDE 10 MG/1
40 CAPSULE ORAL ONCE
Qty: 4 CAPSULE | Refills: 0
Start: 2017-07-26 | End: 2017-07-26

## 2017-07-26 RX ORDER — OXYCODONE HCL 10 MG/1
10 TABLET, FILM COATED, EXTENDED RELEASE ORAL ONCE
Qty: 1 TABLET | Refills: 0 | Status: SHIPPED | OUTPATIENT
Start: 2017-07-26 | End: 2017-07-26

## 2017-07-26 NOTE — PROGRESS NOTES
"Divina is a 32 year old   female with Patient's last menstrual period was 2017., who presents requesting placement of an intrauterine device.  She does not have history of STD.  She is in a monogamous relationship.  Her menstrual cycles are regular, heavy for first day; she cannot be on BCP due to DVT.. Her last Pap smear was NIL, HR HPV.  After reviewing the options for IUD devices, she has elected to have a Mirena IUD placed, understanding that there is a potential for irregular bleeding in the first 6-9 months, and may result in lighter periods in the future.  Her current form of birth control is none.  Due to a good deal of underlying anxiety, she presents today for premeds: Toradol, Bentyl, Oxycontin, Xanax and Zofran (see orders) and plans on calling for a ride home.  Written informed consent was reviewed and signed prior to giving her premeds.    Patient Active Problem List    Diagnosis Date Noted     Cervical high risk HPV (human papillomavirus) test positive 2017     Priority: Medium     6/15/2017 Pap:NIL, +HR HPV \"other\" (not 16/18). Plan to repeat Pap+HPV in 1 year       DVT (deep venous thrombosis) (H) 2017     Priority: Medium     CKD (chronic kidney disease) stage 3, GFR 30-59 ml/min 2017     Priority: Medium     Malignant neoplasm of pancreas, unspecified location of malignancy (H) 2017     Priority: Medium     Type 2 diabetes mellitus with stage 3 chronic kidney disease, without long-term current use of insulin (H) 2017     Priority: Medium     Bipolar disorder (H) 2017     Priority: Medium     HYPERLIPIDEMIA LDL GOAL <100 10/31/2010     Priority: Medium     HTN (hypertension) 2008     Priority: Medium     (Problem list name updated by automated process. Provider to review and confirm.)       non alcoholic fatty liver disease 2006     Priority: Medium     See flowsheet for hepatic panel.   Stopped zocor, was on godon as well had right upper " quandrant ultrasound and ct  ? Psych med related vs SAHNI       Esophageal reflux 04/14/2005     Priority: Medium     GERD         Past Medical History:   Diagnosis Date     Cervical high risk HPV (human papillomavirus) test positive 6/20/2017     Depressive disorder, not elsewhere classified      DVT (deep venous thrombosis) (H)      Dysmetabolic syndrome X      Esophageal reflux     GERD     Mild intermittent asthma      Other abnormal heart sounds     Heart murmur     Other specified types of schizophrenia, unspecified condition      Pancreatic cancer (H)     left adrenal gland, partial pancreas, and spleen removed; no chemo or radiation.      Type II or unspecified type diabetes mellitus without mention of complication, not stated as uncontrolled      Unspecified disorder of tympanic membrane        Past Surgical History:   Procedure Laterality Date     ADRENALECTOMY       PANCREATECTOMY PARTIAL       SPLENECTOMY       SURGICAL HISTORY OF -   10/1/2000    Tympanoplasty     SURGICAL HISTORY OF -   10/1/2000    Tonsillectomy       Current Outpatient Prescriptions   Medication Sig Dispense Refill     ketorolac (TORADOL) 60 MG/2ML SOLN injection Inject 2 mLs (60 mg) into the muscle once for 1 dose 2 mL 0     ALPRAZolam (XANAX) 1 MG tablet Take 1 tablet (1 mg) by mouth once for 1 dose 1 tablet 0     oxyCODONE (OXYCONTIN) 10 MG 12 hr tablet Take 1 tablet (10 mg) by mouth once for 1 dose 1 tablet 0     dicyclomine (BENTYL) 10 MG capsule Take 4 capsules (40 mg) by mouth once for 1 dose 4 capsule 0     ondansetron (ZOFRAN) 8 MG tablet Take 1 tablet (8 mg) by mouth once for 1 dose 1 tablet 0     glipiZIDE (GLUCOTROL) 10 MG tablet Take 1 tablet (10 mg) by mouth 2 times daily (before meals) 60 tablet 2     MELATONIN PO Take 3 mg by mouth nightly as needed       chlorthalidone (HYGROTON) 25 MG tablet Take 0.5 tablets (12.5 mg) by mouth daily 30 tablet 1     blood glucose monitoring (NO BRAND SPECIFIED) test strip 100 strips  by In Vitro route daily Use to test blood sugar 1 times daily  And as needed ACCU Check Haylee Plus Test Strips 100 each 1     Vitamin D, Cholecalciferol, 1000 UNITS TABS Take 1 tablet by mouth daily 90 tablet 3     amLODIPine (NORVASC) 5 MG tablet Take 1 tablet (5 mg) by mouth daily 90 tablet 1     ranitidine (ZANTAC) 300 MG tablet Take 1 tablet (300 mg) by mouth At Bedtime 90 tablet 1     loratadine (CLARITIN) 10 MG tablet Take 1 tablet (10 mg) by mouth every morning 90 tablet 1     omeprazole (PRILOSEC) 20 MG CR capsule 1 capsule bid 60 capsule 5     Clozapine (CLOZARIL) 200 MG tablet Take 200 mg by mouth 2 times daily       ARIPiprazole (ABILIFY) 30 MG tablet Take 30 mg by mouth daily       FLUoxetine (PROZAC) 20 MG capsule Take 20 mg by mouth daily       naltrexone (DEPADE;REVIA) 50 MG tablet Take 50 mg by mouth every morning       LANCETS MISC. KIT needs lancet   1 0     metroNIDAZOLE (FLAGYL) 500 MG tablet Take 1 tablet (500 mg) by mouth 2 times daily (Patient not taking: Reported on 7/26/2017) 14 tablet 0     montelukast (SINGULAIR) 10 MG tablet Take 1 tablet (10 mg) by mouth At Bedtime 90 tablet 1       Allergies:  Latex and Tylenol [tylenol]    Family History   Problem Relation Age of Onset     Respiratory Mother      ASTHMA     Allergies Mother      Depression Mother      HEART DISEASE Father      HEART VALVE REPLACEMENT     Hypertension Father      DIABETES Father      Depression Father      Respiratory Brother      Allergies Brother      Respiratory Sister      Allergies Sister      Depression Sister      Respiratory Sister      Allergies Sister      Depression Sister      KIDNEY DISEASE No family hx of        Social History     Social History     Marital status: Single     Spouse name: N/A     Number of children: 0     Years of education: N/A     Social History Main Topics     Smoking status: Current Every Day Smoker     Packs/day: 1.00     Years: 4.00     Types: Cigarettes     Smokeless  tobacco: Never Used      Comment: 1 packe every other day      Alcohol use No     Drug use: No     Sexual activity: Not Currently     Partners: Female     Other Topics Concern     None     Social History Narrative     HCG negative    Procedure note:The patient was given informed consent which was signed and dated.  The patient was placed in a supine position and a speculum placed with the vagina, to visualize the cervix.  It was prepped with iodine and the anterior cervix grasped with a tenaculum.  The cervix was sounded and the Mirena IUD placed in the cavity at the fundus. The string was trimmed 2-3cm from the external cervical os.  A post-procedure ultrasound was not performed to assure the IUD was in place in the uterine cavity.  The patient was given post-procedure instructions, and left the clinic in satisfactory condition.    Assessment:  V25.1  S/P IUD Placement    Plan:  Pt to monitor for any signs or symptoms of infection.  She was informed of the possible risk for ectopic pregnancy, and the potential for expulsion of the IUD.    She may followup prn.    Althea Roberts MD

## 2017-07-26 NOTE — NURSING NOTE
"Initial /66 (BP Location: Right arm, Patient Position: Chair, Cuff Size: Adult Large)  Pulse 107  Ht 5' 4.5\" (1.638 m)  Wt 187 lb (84.8 kg)  LMP 07/03/2017  BMI 31.6 kg/m2 Estimated body mass index is 31.6 kg/(m^2) as calculated from the following:    Height as of this encounter: 5' 4.5\" (1.638 m).    Weight as of this encounter: 187 lb (84.8 kg). .      "

## 2017-07-26 NOTE — MR AVS SNAPSHOT
After Visit Summary   7/26/2017    Divina Delgadillo    MRN: [id number]           Patient Information     Date Of Birth          1985        Visit Information        Provider Department      7/26/2017 10:30 AM [place], Ob Nurse -; Althea Roberts MD; [place] [facility]        Today's Diagnoses     Encounter for IUD insertion    -  1    IUD (intrauterine device) in place           Follow-ups after your visit        Your next 10 appointments already scheduled     Jul 27, 2017  1:00 PM CDT   New Visit with Jeremiah Perdomo MD   [facility])    [address] [place] [address] [id number] [phone]            Jan 09, 2018  9:30 AM CST   LAB with LAB FIRST FLOOR [facility])    [address] [place] [id number] [phone]           Patient must bring picture ID. Patient should be prepared to give a urine specimen  Please do not eat 10-12 hours before your appointment if you are coming in fasting for labs on lipids, cholesterol, or glucose (sugar). Pregnant women should follow their Care Team instructions. Water with medications is okay. Do not drink coffee or other fluids. If you have concerns about taking  your medications, please ask at office or if scheduling via [employer], send a message by clicking on Secure Messaging, Message Your Care Team.            Jan 09, 2018 10:00 AM CST   Return Visit with Sánchez Dias MD   [facility])    [address] [place] [address] [id number] [phone]              Who to contact     If you have questions or need follow up information about today's clinic visit or your schedule please contact [facility] directly at [phone].  Normal or non-critical lab and imaging results will be communicated to you by MyChart, letter or phone within 4  "business days after the clinic has received the results. If you do not hear from us within 7 days, please contact the clinic through [employer] or phone. If you have a critical or abnormal lab result, we will notify you by phone as soon as possible.  Submit refill requests through [employer] or call your pharmacy and they will forward the refill request to us. Please allow 3 business days for your refill to be completed.          Additional Information About Your Visit        [employer] Information     [employer] lets you send messages to your doctor, view your test results, renew your prescriptions, schedule appointments and more. To sign up, go to [email or web address] . Click on \"Log in\" on the left side of the screen, which will take you to the Welcome page. Then click on \"Sign up Now\" on the right side of the page.     You will be asked to enter the access code listed below, as well as some personal information. Please follow the directions to create your username and password.     Your access code is: [email or web address]  Expires: 2017  9:32 AM     Your access code will  in 90 days. If you need help or a new code, please call your [facility] clinic or [phone].        Care EveryWhere ID     This is your Care EveryWhere ID. This could be used by other organizations to access your [facility] medical records  [id number]        Your Vitals Were     Pulse Height Last Period BMI (Body Mass Index)          107 5' 4.5\" (1.638 m) 2017 31.6 kg/m2         Blood Pressure from Last 3 Encounters:   17 129/66   17 133/66   07/10/17 129/60    Weight from Last 3 Encounters:   17 187 lb (84.8 kg)   17 187 lb 6.4 oz (85 kg)   07/10/17 186 lb 1.6 oz (84.4 kg)              We Performed the Following     Beta HCG qual IFA urine     HC LEVONORGESTREL IU 52MG 5 YR     INJECTION INTRAMUSCULAR OR SUB-Q     INSERTION INTRAUTERINE DEVICE     KETOROLAC TROMETHAMINE 15MG          Today's Medication Changes        "   These changes are accurate as of: 7/26/17  1:35 PM.  If you have any questions, ask your nurse or doctor.               Start taking these medicines.        Dose/Directions    ALPRAZolam 1 MG tablet   Commonly known as:  XANAX   Used for:  Encounter for IUD insertion   Started by:  Althea Roberts MD        Dose:  1 mg   Take 1 tablet (1 mg) by mouth once for 1 dose   Quantity:  1 tablet   Refills:  0       dicyclomine 10 MG capsule   Commonly known as:  BENTYL   Used for:  Encounter for IUD insertion   Started by:  Althea Roberts MD        Dose:  40 mg   Take 4 capsules (40 mg) by mouth once for 1 dose   Quantity:  4 capsule   Refills:  0       ketorolac 60 MG/2ML Soln injection   Commonly known as:  TORADOL   Used for:  Encounter for IUD insertion   Started by:  Althea Roberts MD        Dose:  60 mg   Inject 2 mLs (60 mg) into the muscle once for 1 dose   Quantity:  2 mL   Refills:  0       ondansetron 8 MG tablet   Commonly known as:  ZOFRAN   Used for:  Encounter for IUD insertion   Started by:  Althea Roberts MD        Dose:  8 mg   Take 1 tablet (8 mg) by mouth once for 1 dose   Quantity:  1 tablet   Refills:  0       oxyCODONE 10 MG 12 hr tablet   Commonly known as:  OxyCONTIN   Used for:  Encounter for IUD insertion   Started by:  Althea Roberts MD        Dose:  10 mg   Take 1 tablet (10 mg) by mouth once for 1 dose   Quantity:  1 tablet   Refills:  0            Where to get your medicines      Some of these will need a paper prescription and others can be bought over the counter.  Ask your nurse if you have questions.     Bring a paper prescription for each of these medications     ALPRAZolam 1 MG tablet    ketorolac 60 MG/2ML Soln injection    oxyCODONE 10 MG 12 hr tablet       You don't need a prescription for these medications     dicyclomine 10 MG capsule    ondansetron 8 MG tablet                Primary Care Provider Office Phone # Fax #    Ggdy  Shirley Eva, APRN [phone]       [facility] [address] [address]        Equal Access to Services     FANG HAY : Hadii aad ku hadyennifero Sochelleali, waaxda luqadaha, qaybta kaalmada adeegyada, aaron michaelspadmini bequang gaitanbambi munoz. So [facility] [phone].    ATENCIÓN: Si habla español, tiene a gil disposición servicios gratuitos de asistencia lingüística. Llame al [phone].    We comply with applicable federal civil rights laws and [place] laws. We do not discriminate on the basis of race, color, national origin, age, disability sex, sexual orientation or gender identity.            Thank you!     Thank you for choosing [facility]  for your care. Our goal is always to provide you with excellent care. Hearing back from our patients is one way we can continue to improve our services. Please take a few minutes to complete the written survey that you may receive in the mail after your visit with us. Thank you!             Your Updated Medication List - Protect others around you: Learn how to safely use, store and throw away your medicines at www.disposemymeds.org.          This list is accurate as of: 7/26/17  1:35 PM.  Always use your most recent med list.                   Brand Name Dispense Instructions for use Diagnosis    ABILIFY 30 MG tablet   Generic drug:  ARIPiprazole      Take 30 mg by mouth daily        ALPRAZolam 1 MG tablet    XANAX    1 tablet    Take 1 tablet (1 mg) by mouth once for 1 dose    Encounter for IUD insertion       amLODIPine 5 MG tablet    NORVASC    90 tablet    Take 1 tablet (5 mg) by mouth daily    Essential hypertension       blood glucose lancets standard    no brand specified    1    needs lancet     Type II or unspecified type diabetes mellitus without mention of complication, not stated as uncontrolled, Mild persistent asthma       blood glucose monitoring test strip    no brand specified    100  each    100 strips by In Vitro route daily Use to test blood sugar 1 times daily  And as needed ACCU Check Haylee Plus Test Strips    Type 2 diabetes mellitus with stage 3 chronic kidney disease, without long-term current use of insulin (H)       chlorthalidone 25 MG tablet    HYGROTON    30 tablet    Take 0.5 tablets (12.5 mg) by mouth daily    Essential hypertension       Clozapine 200 MG tablet    CLOZARIL     Take 200 mg by mouth 2 times daily        dicyclomine 10 MG capsule    BENTYL    4 capsule    Take 4 capsules (40 mg) by mouth once for 1 dose    Encounter for IUD insertion       FLUoxetine 20 MG capsule    PROzac     Take 20 mg by mouth daily        glipiZIDE 10 MG tablet    GLUCOTROL    60 tablet    Take 1 tablet (10 mg) by mouth 2 times daily (before meals)    Type 2 diabetes mellitus with stage 3 chronic kidney disease, without long-term current use of insulin (H)       ketorolac 60 MG/2ML Soln injection    TORADOL    2 mL    Inject 2 mLs (60 mg) into the muscle once for 1 dose    Encounter for IUD insertion       loratadine 10 MG tablet    CLARITIN    90 tablet    Take 1 tablet (10 mg) by mouth every morning    Seasonal allergic rhinitis, unspecified allergic rhinitis trigger       MELATONIN PO      Take 3 mg by mouth nightly as needed        metroNIDAZOLE 500 MG tablet    FLAGYL    14 tablet    Take 1 tablet (500 mg) by mouth 2 times daily    Bacterial vaginitis       montelukast 10 MG tablet    SINGULAIR    90 tablet    Take 1 tablet (10 mg) by mouth At Bedtime    Seasonal allergic rhinitis, unspecified allergic rhinitis trigger       naltrexone 50 MG tablet    DEPADE;REVIA     Take 50 mg by mouth every morning        omeprazole 20 MG CR capsule    priLOSEC    60 capsule    1 capsule bid    Gastroesophageal reflux disease without esophagitis       ondansetron 8 MG tablet    ZOFRAN    1 tablet    Take 1 tablet (8 mg) by mouth once for 1 dose    Encounter for IUD insertion       oxyCODONE 10 MG 12 hr  tablet    OxyCONTIN    1 tablet    Take 1 tablet (10 mg) by mouth once for 1 dose    Encounter for IUD insertion       ranitidine 300 MG tablet    ZANTAC    90 tablet    Take 1 tablet (300 mg) by mouth At Bedtime    Gastroesophageal reflux disease without esophagitis       Vitamin D (Cholecalciferol) 1000 UNITS Tabs     90 tablet    Take 1 tablet by mouth daily    Vitamin D deficiency

## 2017-07-27 ENCOUNTER — OFFICE VISIT (OUTPATIENT)
Dept: GASTROENTEROLOGY | Facility: CLINIC | Age: 32
End: 2017-07-27
Attending: NURSE PRACTITIONER
Payer: MEDICARE

## 2017-07-27 VITALS
WEIGHT: 192.6 LBS | SYSTOLIC BLOOD PRESSURE: 131 MMHG | HEART RATE: 107 BPM | DIASTOLIC BLOOD PRESSURE: 67 MMHG | HEIGHT: 65 IN | BODY MASS INDEX: 32.09 KG/M2

## 2017-07-27 DIAGNOSIS — E88.810 METABOLIC SYNDROME X: Primary | ICD-10-CM

## 2017-07-27 DIAGNOSIS — K75.81 NASH (NONALCOHOLIC STEATOHEPATITIS): ICD-10-CM

## 2017-07-27 PROCEDURE — 99204 OFFICE O/P NEW MOD 45 MIN: CPT | Performed by: INTERNAL MEDICINE

## 2017-07-27 ASSESSMENT — PAIN SCALES - GENERAL: PAINLEVEL: NO PAIN (0)

## 2017-07-27 NOTE — NURSING NOTE
"Divina Delgadillo's goals for this visit include:   Chief Complaint   Patient presents with     Consult     abnormal LFT's     She requests these members of her care team be copied on today's visit information: YES -     Referring Provider:  VERONIQUE Bledsoe [facility]  [address] [place] [address]    Initial /67 (BP Location: Left arm, Patient Position: Chair, Cuff Size: Adult Large)  Pulse 107  Ht 1.638 m (5' 4.5\")  Wt 87.4 kg (192 lb 9.6 oz)  LMP 07/03/2017  BMI 32.55 kg/m2 Estimated body mass index is 32.55 kg/(m^2) as calculated from the following:    Height as of this encounter: 1.638 m (5' 4.5\").    Weight as of this encounter: 87.4 kg (192 lb 9.6 oz).  BP completed using cuff size: large        "

## 2017-07-27 NOTE — PROGRESS NOTES
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     Based on her metabolic risk factors and life-style, which is described as sedentary with consumption of large quantities of carbonated beverages, the likely diagnosis is NAFLD. However, we'll need to evaluate and rule out other causes of chronic liver disease including Casimiro's, hemochromatosis, A1ATD, autoimmune, PBC and PSC.  Will also consider a liver biopsy for better assessment of fibrosis based on duration of lab abnormalities.      In addition, she'll need follow up and evaluation by oncology based on the history of pancreatic cyst adenoma.     Finally, I encouraged her to start making life-style changes including better dietary habits, increased exercise with the intention of weight loss.     - Blood work in a week or so at [facility], evaluating for secondary causes of chronic liver disease.   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     She's had elevated liver enzymes on/off since at least 2005. She was evaluated by Dr. Raul Bains in hepatology, and basic workup was negative for viral hepatitis, and based on risk factors it was felt the problem was related to fatty liver disease.   Over the years, she's not had good control of her metabolic risk factors and appears the conditions has persisted.     In 2015 she had partial pancreatectomy, splenectomy and left adrenalectomy. Path showed low grade mucinous cyst adenoma, 17 cm with negative margins, and a left adrenal gland cortical adenoma. She's followed with oncology, [facility].   She was also on coumadin until ~ 1 month ago for DVT of the left upper ext.   [place].     Currently, denies chest pain, shortness of breath, abdominal pain, nausea, vomiting fevers, chills, bleeding, jaundice, confusion, falls, rash, pruritis, leg swelling or abdominal distention. Has stable weight, appetite and bowel habits.          Past Medical History:     Past Medical History:   Diagnosis Date     Cervical high risk HPV (human papillomavirus) test positive 6/20/2017     Depressive disorder, not elsewhere classified      DVT (deep venous thrombosis) (H)      Dysmetabolic syndrome X      Esophageal reflux     GERD     Mild intermittent asthma      Other abnormal heart sounds     Heart murmur     Other specified types of schizophrenia, unspecified condition      Pancreatic cancer (H)     left adrenal gland, partial pancreas, and spleen removed; no chemo or radiation.      Type II or unspecified type diabetes mellitus without mention of complication, not stated as uncontrolled       Unspecified disorder of tympanic membrane      Patient Active Problem List   Diagnosis     Esophageal reflux     non alcoholic fatty liver disease     HTN (hypertension)     HYPERLIPIDEMIA LDL GOAL <100     DVT (deep venous thrombosis) (H)     CKD (chronic kidney disease) stage 3, GFR 30-59 ml/min     Malignant neoplasm of pancreas, unspecified location of malignancy (H)     Type 2 diabetes mellitus with stage 3 chronic kidney disease, without long-term current use of insulin (H)     Bipolar disorder (H)     Cervical high risk HPV (human papillomavirus) test positive     IUD (intrauterine device) in place            Past Surgical History:     Past Surgical History:   Procedure Laterality Date     ADRENALECTOMY       PANCREATECTOMY PARTIAL       SPLENECTOMY       SURGICAL HISTORY OF -   10/1/2000    Tympanoplasty     SURGICAL HISTORY OF -   10/1/2000    Tonsillectomy            Medications:     Current Outpatient Prescriptions   Medication     glipiZIDE (GLUCOTROL) 10 MG tablet     MELATONIN PO     chlorthalidone (HYGROTON) 25 MG tablet     blood glucose monitoring (NO BRAND SPECIFIED) test strip     Vitamin D, Cholecalciferol, 1000 UNITS TABS     amLODIPine (NORVASC) 5 MG tablet     montelukast (SINGULAIR) 10 MG tablet     ranitidine (ZANTAC) 300 MG tablet     loratadine (CLARITIN) 10 MG tablet     omeprazole (PRILOSEC) 20 MG CR capsule     Clozapine (CLOZARIL) 200 MG tablet     ARIPiprazole (ABILIFY) 30 MG tablet     FLUoxetine (PROZAC) 20 MG capsule     naltrexone (DEPADE;REVIA) 50 MG tablet     LANCETS MISC. KIT     metroNIDAZOLE (FLAGYL) 500 MG tablet     No current facility-administered medications for this visit.             Allergies:     Allergies   Allergen Reactions     Latex      Tylenol [Tylenol]             Social History:     Social History     Social History     Marital status: Single     Spouse name: N/A     Number of children: 0     Years of education: N/A     Occupational History     Not on file.  "    Social History Main Topics     Smoking status: Current Every Day Smoker     Packs/day: 1.00     Years: 4.00     Types: Cigarettes     Smokeless tobacco: Never Used      Comment: 1 packe every other day      Alcohol use No     Drug use: No     Sexual activity: Not Currently     Partners: Female     Other Topics Concern     Not on file     Social History Narrative            Family History:     Family History   Problem Relation Age of Onset     Respiratory Mother      ASTHMA     Allergies Mother      Depression Mother      HEART DISEASE Father      HEART VALVE REPLACEMENT     Hypertension Father      DIABETES Father      Depression Father      Respiratory Brother      Allergies Brother      Respiratory Sister      Allergies Sister      Depression Sister      Respiratory Sister      Allergies Sister      Depression Sister      KIDNEY DISEASE No family hx of               Review of Systems:   10 points ROS was obtained and highlighted in the HPI, otherwise negative.          Physical Exam:   VS:  /67 (BP Location: Left arm, Patient Position: Chair, Cuff Size: Adult Large)  Pulse 107  Ht 1.638 m (5' 4.5\")  Wt 87.4 kg (192 lb 9.6 oz)  LMP 07/03/2017  BMI 32.55 kg/m2      Gen: A&Ox3, NAD  HEENT: non-icteric , oropharynx clear  CV: tachycardic  Lung: CTAB  Abd: soft, NT, obese.   Ext: no edema, intact pulses.   Skin: No stigmata of chronic liver disease.   Neuro: no focal deficits, grossly intact, no asterixis   Psych: appropriate mood and affects         Data:   Reviewed in person and significant for:  Lab Results   Component Value Date    WBC 13.3 07/24/2017     Lab Results   Component Value Date    RBC 4.08 07/24/2017     Lab Results   Component Value Date    HGB 11.0 07/24/2017     Lab Results   Component Value Date    HCT 35.7 07/24/2017     No components found for: MCT  Lab Results   Component Value Date    MCV 88 07/24/2017     Lab Results   Component Value Date    MCH 27.0 07/24/2017     Lab Results "   Component Value Date    MCHC 30.8 07/24/2017     Lab Results   Component Value Date    RDW 16.6 07/24/2017     Lab Results   Component Value Date     07/24/2017     Last Basic Metabolic Panel:  Lab Results   Component Value Date     07/24/2017      Lab Results   Component Value Date    POTASSIUM 3.9 07/24/2017     Lab Results   Component Value Date    CHLORIDE 105 07/24/2017     Lab Results   Component Value Date    CLAY 9.3 07/24/2017     Lab Results   Component Value Date    CO2 25 07/24/2017     Lab Results   Component Value Date    BUN 23 07/24/2017     Lab Results   Component Value Date    CR 1.41 07/24/2017     Lab Results   Component Value Date     07/24/2017     Liver Function Studies -   Recent Labs   Lab Test  07/24/17   0857   06/15/17   0923   05/17/17   1606   PROTTOTAL   --    --    --    --   8.3   ALBUMIN  3.1*   < >   --    < >  3.3*   BILITOTAL   --    --    --    --   0.4   ALKPHOS   --    --   179*   < >  231*   AST   --    --   48*   < >  50*   ALT   --    --   56*   < >  76*    < > = values in this interval not displayed.

## 2017-07-27 NOTE — MR AVS SNAPSHOT
After Visit Summary   7/27/2017    Divina Delgadillo    MRN: [id number]           Patient Information     Date Of Birth          1985        Visit Information        Provider Department      7/27/2017 1:00 PM Jeremiah Perdomo MD [facility]        Today's Diagnoses     Metabolic syndrome X    -  1    SAHNI (nonalcoholic steatohepatitis)           Follow-ups after your visit        Additional Services     NUTRITION REFERRAL       Diabetes, metabolic syndrome, CKD 3, SAHNI can be at [facility]                  Your next 10 appointments already scheduled     Nov 09, 2017  3:00 PM CST   [facility]    [address] [place] [id number] [phone]            Jan 09, 2018  9:30 AM CST   LAB with LAB FIRST FLOOR [facility] ([facility])    [id number] [place] [id number] [phone]           Patient must bring picture ID. Patient should be prepared to give a urine specimen  Please do not eat 10-12 hours before your appointment if you are coming in fasting for labs on lipids, cholesterol, or glucose (sugar). Pregnant women should follow their Care Team instructions. Water with medications is okay. Do not drink coffee or other fluids. If you have concerns about taking  your medications, please ask at office or if scheduling via IdleAir, send a message by clicking on Secure Messaging, Message Your Care Team.            Jan 09, 2018 10:00 AM CST   [facility] Visit with S[facility]chez Dias [facility]   [facility]    [address] [place] [id number] [phone]              Who to contact     If you have questions or need follow up information about today's clinic visit or your schedule please contact [facility] directly at [phone].  Normal or non-critical lab  "and imaging results will be communicated to you by MyChart, letter or phone within 4 business days after the clinic has received the results. If you do not hear from us within 7 days, please contact the clinic through Solafe[employer] or phone. If you have a critical or abnormal lab result, we will notify you by phone as soon as possible.  Submit refill requests through MonoLibre or call your pharmacy and they will forward the refill request to us. Please allow 3 business days for your refill to be completed.          Additional Information About Your Visit        MonoLibre Information     MonoLibre is an electronic gateway that provides easy, online access to your medical records. With MonoLibre, you can request a clinic appointment, read your test results, renew a prescription or communicate with your care team.     To sign up for MonoLibre visit the website at [email or web address]   You will be asked to enter the access code listed below, as well as some personal information. Please follow the directions to create your username and password.     Your access code is: [email or web address]  Expires: 2017  9:32 AM     Your access code will  in 90 days. If you need help or a new code, please contact your [facility] or call [phone] for assistance.        Care EveryWhere ID     This is your Care EveryWhere ID. This could be used by other organizations to access your [place] medical records  [id number]        Your Vitals Were     Pulse Height Last Period BMI (Body Mass Index)          107 1.638 m (5' 4.5\") 2017 32.55 kg/m2         Blood Pressure from Last 3 Encounters:   17 131/67   17 129/66   17 133/66    Weight from Last 3 Encounters:   17 87.4 kg (192 lb 9.6 oz)   17 84.8 kg (187 lb)   17 85 kg (187 lb 6.4 oz)              We Performed the Following     NUTRITION REFERRAL        Primary Care Provider Office Phone # Fax #    Jaleesa Velasquez, " APRN [phone]       [facility] [address] [facility] [address]        Equal Access to Services     FANG HAY : Hadii aad ku hadyenniferlogan Pamela, waemelinada francoqdanny, jonata kaalmada alejo, aaron sy juwan munoz. So [facility] [phone].    ATENCIÓN: Si habla español, tiene a gil disposición servicios gratuitos de asistencia lingüística. Llame al [phone].    We comply with applicable federal civil rights laws and [place] laws. We do not discriminate on the basis of race, color, national origin, age, disability sex, sexual orientation or gender identity.            Thank you!     Thank you for choosing [facility]  for your care. Our goal is always to provide you with excellent care. Hearing back from our patients is one way we can continue to improve our services. Please take a few minutes to complete the written survey that you may receive in the mail after your visit with us. Thank you!             Your Updated Medication List - Protect others around you: Learn how to safely use, store and throw away your medicines at www.disposemymeds.org.          This list is accurate as of: 7/27/17  2:13 PM.  Always use your most recent med list.                   Brand Name Dispense Instructions for use Diagnosis    ABILIFY 30 MG tablet   Generic drug:  ARIPiprazole      Take 30 mg by mouth daily        amLODIPine 5 MG tablet    NORVASC    90 tablet    Take 1 tablet (5 mg) by mouth daily    Essential hypertension       blood glucose lancets standard    no brand specified    1    needs lancet     Type II or unspecified type diabetes mellitus without mention of complication, not stated as uncontrolled, Mild persistent asthma       blood glucose monitoring test strip    no brand specified    100 each    100 strips by In Vitro route daily Use to test blood sugar 1 times daily  And as needed ACCU Check Haylee Plus Test Strips    Type 2  diabetes mellitus with stage 3 chronic kidney disease, without long-term current use of insulin (H)       chlorthalidone 25 MG tablet    HYGROTON    30 tablet    Take 0.5 tablets (12.5 mg) by mouth daily    Essential hypertension       Clozapine 200 MG tablet    CLOZARIL     Take 200 mg by mouth 2 times daily        FLUoxetine 20 MG capsule    PROzac     Take 20 mg by mouth daily        glipiZIDE 10 MG tablet    GLUCOTROL    60 tablet    Take 1 tablet (10 mg) by mouth 2 times daily (before meals)    Type 2 diabetes mellitus with stage 3 chronic kidney disease, without long-term current use of insulin (H)       loratadine 10 MG tablet    CLARITIN    90 tablet    Take 1 tablet (10 mg) by mouth every morning    Seasonal allergic rhinitis, unspecified allergic rhinitis trigger       MELATONIN PO      Take 3 mg by mouth nightly as needed        metroNIDAZOLE 500 MG tablet    FLAGYL    14 tablet    Take 1 tablet (500 mg) by mouth 2 times daily    Bacterial vaginitis       montelukast 10 MG tablet    SINGULAIR    90 tablet    Take 1 tablet (10 mg) by mouth At Bedtime    Seasonal allergic rhinitis, unspecified allergic rhinitis trigger       naltrexone 50 MG tablet    DEPADE;REVIA     Take 50 mg by mouth every morning        omeprazole 20 MG CR capsule    priLOSEC    60 capsule    1 capsule bid    Gastroesophageal reflux disease without esophagitis       ranitidine 300 MG tablet    ZANTAC    90 tablet    Take 1 tablet (300 mg) by mouth At Bedtime    Gastroesophageal reflux disease without esophagitis       Vitamin D (Cholecalciferol) 1000 UNITS Tabs     90 tablet    Take 1 tablet by mouth daily    Vitamin D deficiency

## 2017-08-02 ENCOUNTER — TELEPHONE (OUTPATIENT)
Dept: FAMILY MEDICINE | Facility: CLINIC | Age: 32
End: 2017-08-02

## 2017-08-02 ENCOUNTER — CARE COORDINATION (OUTPATIENT)
Dept: CARE COORDINATION | Facility: CLINIC | Age: 32
End: 2017-08-02

## 2017-08-02 DIAGNOSIS — Z86.718 HISTORY OF DEEP VENOUS THROMBOSIS: Primary | ICD-10-CM

## 2017-08-02 NOTE — PROGRESS NOTES
Clinic Care Coordination Contact  Care Team Conversations  D/I: Received a call from patient's foster mother, Lorin Knight (consent to communicate in chart). She reports that patient saw Dr. Perdomo at  Clinic and a nutrition referral was written. (CC can see order in chart). She has not heard anything about scheduling, was looking for a number to call. Provided her with central scheduling number and advised to call back if she has difficulty.  P: RN CC will f/u in 2-4 weeks.    Jose De Jesus ASHLEYN,RN- BC  Clinic Care Coordinator  [facility]  Phone: [phone]

## 2017-08-02 NOTE — TELEPHONE ENCOUNTER
Jaleesa,    We received a fax from [facility] dental clinic ([phone]) for this patient and the foster mother called also about the INR for this patient.  Stopped her coumadin in June.  Last INR 2.48.  The dental clinic wants a repeat INR 24 hours prior to extractions. I have ordered this for the patient and the foster mother will scheduled lab appt for 8/15/17.  The dental clinic is not in office right now.  I will call them tomorrow and advise that we have ordered the INR for the day before extractions. Thank you, Debora LOPEZ RN

## 2017-08-02 NOTE — TELEPHONE ENCOUNTER
Reason for Call:  Other need new INR or is last on ok?    Detailed comments: Lorin (pt's foster mom) calling to let us know that pt is going to the dentist on 8-16 and 8-23 and having a total of 7 teeth extracted. The dentist wanted to know if her last INR was good enough of if they need a new one done.    Phone Number Patient can be reached at: Home number on file Lorin: [phone] (home)    Best Time: any    Can we leave a detailed message on this number? YES    Call taken on 8/2/2017 at 3:41 PM by Staci Woods

## 2017-08-03 NOTE — TELEPHONE ENCOUNTER
Still unable to contact a person at the dental clinic.  I have left a message for them that the INR has been ordered and the foster mother knows to have this done the day before the tooth extraction. Debora LOPEZ RN  [phone] is the dental clinic number. Debora LOPEZ RN

## 2017-08-04 NOTE — PROGRESS NOTES
7/10/17    CC: elevated creatinine, hypertension    HPI: Divina Delgadillo is a 32 year old female who presents for follow-up of CKD. To review, her hx is significant for diabetes (~ 20 years), bipolar disease - was on lithium therapy for around 6 years but since our initial visit, she has since been taken off of it. We spent time discussing her kidney disease and the importance of protecting the kidney going forward. Her creatinine has been 1.32-1.66 since Feb - 1.49 on last check in June. She has been away from the [facility] since April. She is overall doing well.        Allergies   Allergen Reactions     Latex      Tylenol [Tylenol]          Current Outpatient Prescriptions on File Prior to Visit:  glipiZIDE (GLUCOTROL) 10 MG tablet Take 1 tablet (10 mg) by mouth 2 times daily (before meals)   MELATONIN PO Take 3 mg by mouth nightly as needed   chlorthalidone (HYGROTON) 25 MG tablet Take 0.5 tablets (12.5 mg) by mouth daily   blood glucose monitoring (NO BRAND SPECIFIED) test strip 100 strips by In Vitro route daily Use to test blood sugar 1 times daily  And as needed ACCU Check Haylee Plus Test Strips   Vitamin D, Cholecalciferol, 1000 UNITS TABS Take 1 tablet by mouth daily   amLODIPine (NORVASC) 5 MG tablet Take 1 tablet (5 mg) by mouth daily   ranitidine (ZANTAC) 300 MG tablet Take 1 tablet (300 mg) by mouth At Bedtime   omeprazole (PRILOSEC) 20 MG CR capsule 1 capsule bid   Clozapine (CLOZARIL) 200 MG tablet Take 200 mg by mouth 2 times daily   ARIPiprazole (ABILIFY) 30 MG tablet Take 30 mg by mouth daily   FLUoxetine (PROZAC) 20 MG capsule Take 20 mg by mouth daily   naltrexone (DEPADE;REVIA) 50 MG tablet Take 50 mg by mouth every morning   LANCETS MISC. KIT needs lancet     metroNIDAZOLE (FLAGYL) 500 MG tablet Take 1 tablet (500 mg) by mouth 2 times daily (Patient not taking: Reported on 7/26/2017)   montelukast (SINGULAIR) 10 MG tablet Take 1 tablet (10 mg) by mouth At Bedtime     No current  facility-administered medications on file prior to visit.     Past Medical History:   Diagnosis Date     Cervical high risk HPV (human papillomavirus) test positive 6/20/2017     Depressive disorder, not elsewhere classified      DVT (deep venous thrombosis) (H)      Dysmetabolic syndrome X      Esophageal reflux     GERD     Mild intermittent asthma      Other abnormal heart sounds     Heart murmur     Other specified types of schizophrenia, unspecified condition      Pancreatic cancer (H)     left adrenal gland, partial pancreas, and spleen removed; no chemo or radiation.      Type II or unspecified type diabetes mellitus without mention of complication, not stated as uncontrolled      Unspecified disorder of tympanic membrane        Past Surgical History:   Procedure Laterality Date     ADRENALECTOMY       PANCREATECTOMY PARTIAL       SPLENECTOMY       SURGICAL HISTORY OF -   10/1/2000    Tympanoplasty     SURGICAL HISTORY OF -   10/1/2000    Tonsillectomy       Social History   Substance Use Topics     Smoking status: Current Every Day Smoker     Packs/day: 1.00     Years: 4.00     Types: Cigarettes     Smokeless tobacco: Never Used      Comment: 1 packe every other day      Alcohol use No       Family History   Problem Relation Age of Onset     Respiratory Mother      ASTHMA     Allergies Mother      Depression Mother      HEART DISEASE Father      HEART VALVE REPLACEMENT     Hypertension Father      DIABETES Father      Depression Father      Respiratory Brother      Allergies Brother      Respiratory Sister      Allergies Sister      Depression Sister      Respiratory Sister      Allergies Sister      Depression Sister      KIDNEY DISEASE No family hx of        ROS: A 4 system review of systems was negative other than noted here or above.     Exam:  /60 (BP Location: Left arm)  Pulse 93  Temp 97.8  F (36.6  C) (Oral)  Wt 84.4 kg (186 lb 1.6 oz)  SpO2 99%  BMI 31.45 kg/m2    GENERAL APPEARANCE:  alert and no distress  NECK: supple, no adenopathy  RESP: lungs clear to auscultation   CV: regular rhythm, normal rate, no rub, no edema.   Extremities: no clubbing, cyanosis  SKIN: no rash  NEURO: mentation intact and speech normal  PSYCH: affect normal/bright    Results:    No visits with results within 1 Day(s) from this visit.  Latest known visit with results is:    Orders Only on 06/19/2017   Component Date Value Ref Range Status     Sodium 06/19/2017 140  133 - 144 mmol/L Final     Potassium 06/19/2017 4.1  3.4 - 5.3 mmol/L Final     Chloride 06/19/2017 107  94 - 109 mmol/L Final     Carbon Dioxide 06/19/2017 26  20 - 32 mmol/L Final     Anion Gap 06/19/2017 7  3 - 14 mmol/L Final     Glucose 06/19/2017 176* 70 - 99 mg/dL Final     Urea Nitrogen 06/19/2017 26  7 - 30 mg/dL Final     Creatinine 06/19/2017 1.46* 0.52 - 1.04 mg/dL Final     GFR Estimate 06/19/2017 42* >60 mL/min/1.7m2 Final    Non  GFR Calc     GFR Estimate If Black 06/19/2017 50* >60 mL/min/1.7m2 Final    African American GFR Calc     Calcium 06/19/2017 9.4  8.5 - 10.1 mg/dL Final     Phosphorus 06/19/2017 3.3  2.5 - 4.5 mg/dL Final     Albumin 06/19/2017 3.3* 3.4 - 5.0 g/dL Final     Parathyroid Hormone Intact 06/19/2017 35  12 - 72 pg/mL Final     Hemoglobin 06/19/2017 11.5* 11.7 - 15.7 g/dL Final     Protein Random Urine 06/19/2017 0.35  g/L Final     Protein Total Urine g/gr Creatinine 06/19/2017 0.26* 0 - 0.2 g/g Cr Final     Creatinine Urine 06/19/2017 134  mg/dL Final          Assessment/Plan:   1. CKD STage 3: risk factors for kidney disease include longstanding hypertension as well as longterm lithium use. She is now away from lithium which is great to see given she is already at risk for diabetic nephropathy and would like to minimize risk. We will plan on getting her on ACE-I or ARB therapy In the near future, however, holding for the time being given I would like to see where her function levels after being away from  the lithium for a longer period of time. Will plan to folow closely.     2. DM: last A1C 7.8% in June.     3. Bipolar :now off of lithium.     4. GERD: ideally would avoid PPI therapy given increased risk of incident CKD and AIN noted with PPI therapy. Will minimize for the time being.     Patient Instructions   1. Attempt to decrease omeprazole to once daily. If you begin to have heart burn symptoms, increase back to twice a day  2. Nutrition referral in [place]  3. Labs next week as you have planned  4. Labs every 2 months  5. Follow-up in 6 months     Sánchez Dias,

## 2017-08-07 ENCOUNTER — CARE COORDINATION (OUTPATIENT)
Dept: CARE COORDINATION | Facility: CLINIC | Age: 32
End: 2017-08-07

## 2017-08-08 NOTE — PROGRESS NOTES
Clinic Care Coordination Contact  Care Team Conversations  D/I: Received VM from patient's foster mother, Lorin. She is requesting more information about who to call for nutrition referral. Returned call. Provided with the number listed on the referral. She has called that number and left messages but has not received a call back. She will try again .  P: Lorin will call back if attempts unsuccessful.    Jose De Jesus COTA,RN- BC  Clinic Care Coordinator  [facility]  Phone: [phone]

## 2017-08-09 ENCOUNTER — TELEPHONE (OUTPATIENT)
Dept: GASTROENTEROLOGY | Facility: CLINIC | Age: 32
End: 2017-08-09

## 2017-08-09 ENCOUNTER — TELEPHONE (OUTPATIENT)
Dept: OBGYN | Facility: CLINIC | Age: 32
End: 2017-08-09

## 2017-08-09 NOTE — TELEPHONE ENCOUNTER
----- Message from Jeremiah Perdomo MD sent at 8/6/2017  9:26 PM CDT -----  This young lady needs non-urgent blood work (ordered now), and I am thinking Ct scan as well, but waiting to hear from Sánchez regarding contrast and her kidneys. So will let Divina know as soon as I hear something.     Thanks.

## 2017-08-09 NOTE — TELEPHONE ENCOUNTER
Reason for call:  Patient reporting a symptom    Symptom or request: Pt states she had IUD placed 2 wks ago.  Still bleeding really heavily and has really bad cramps.    Duration (how long have symptoms been present): since she had it placed    Have you been treated for this before? No    Additional comments:     Phone Number patient can be reached at:  Cell number on file:    Telephone Information:   Mobile [phone]       Best Time:  any    Can we leave a detailed message on this number:  No- doesn't have VM    Call taken on 8/9/2017 at 12:49 PM by Magy Bruner

## 2017-08-09 NOTE — TELEPHONE ENCOUNTER
"S-(situation): Pt reports that she is soaking an ultra thick pad every other hour since having the Mirena placed.    B-(background): Mirena placed on 07-26-17    A-(assessment): Pt reports she is having dizziness, lgt headedness, sob, weakness and feels like she is going to pass out.    R-(recommendations): Advised pt if she is experiencing these symptoms she will need to be seen in ER.  Pt puts her foster mom (Lorin) on the phone and she reports that Divina is over exaggerating her symptoms.  \"Believe me if I thought she was in any danger I would take her to the ER\".  She is not going through a pad every other hour and is going about her normal business/activities with no obvious problems.  Advised Lorin that she can expect heavy bleeding for 3-6 months or longer.  Advised to continue to monitor and f/u if should have any further concerns.  Lorin agrees with this plan.    Maggie Martinez  [facility] RN  "

## 2017-08-09 NOTE — TELEPHONE ENCOUNTER
The patient's caregiver (Lorin) was contacted and informed of the need for blood work. The patient is scheduled with the INR nurse next Tuesday, a lab appointment was also added this day.    Juliana Flores CMA

## 2017-08-12 NOTE — PATIENT INSTRUCTIONS
1. Labs monthly for the next 2 months  2. Follow-up in 3 months  3. I will send a message to Dr. Rivero regarding the lithium.    
Yes

## 2017-08-15 DIAGNOSIS — Z86.718 HISTORY OF DEEP VENOUS THROMBOSIS: ICD-10-CM

## 2017-08-15 DIAGNOSIS — N18.30 CKD (CHRONIC KIDNEY DISEASE) STAGE 3, GFR 30-59 ML/MIN (H): ICD-10-CM

## 2017-08-15 DIAGNOSIS — Z79.899 MEDICATION MANAGEMENT: ICD-10-CM

## 2017-08-15 DIAGNOSIS — K75.81 NASH (NONALCOHOLIC STEATOHEPATITIS): ICD-10-CM

## 2017-08-15 LAB
ALBUMIN SERPL-MCNC: 3.6 G/DL (ref 3.4–5)
ALBUMIN UR-MCNC: ABNORMAL MG/DL
ALP SERPL-CCNC: 172 U/L (ref 40–150)
ALT SERPL W P-5'-P-CCNC: 59 U/L (ref 0–50)
ANION GAP SERPL CALCULATED.3IONS-SCNC: 6 MMOL/L (ref 3–14)
APPEARANCE UR: CLEAR
AST SERPL W P-5'-P-CCNC: 36 U/L (ref 0–45)
BASOPHILS # BLD AUTO: 0.1 10E9/L (ref 0–0.2)
BASOPHILS NFR BLD AUTO: 0.7 %
BILIRUB DIRECT SERPL-MCNC: <0.1 MG/DL (ref 0–0.2)
BILIRUB SERPL-MCNC: 0.3 MG/DL (ref 0.2–1.3)
BILIRUB UR QL STRIP: NEGATIVE
BUN SERPL-MCNC: 27 MG/DL (ref 7–30)
CALCIUM SERPL-MCNC: 9.5 MG/DL (ref 8.5–10.1)
CHLORIDE SERPL-SCNC: 105 MMOL/L (ref 94–109)
CO2 SERPL-SCNC: 26 MMOL/L (ref 20–32)
COLOR UR AUTO: YELLOW
CREAT SERPL-MCNC: 1.43 MG/DL (ref 0.52–1.04)
CREAT UR-MCNC: 211 MG/DL
DIFFERENTIAL METHOD BLD: ABNORMAL
EOSINOPHIL # BLD AUTO: 0.3 10E9/L (ref 0–0.7)
EOSINOPHIL NFR BLD AUTO: 2 %
ERYTHROCYTE [DISTWIDTH] IN BLOOD BY AUTOMATED COUNT: 17.3 % (ref 10–15)
FERRITIN SERPL-MCNC: 8 NG/ML (ref 12–150)
GFR SERPL CREATININE-BSD FRML MDRD: 43 ML/MIN/1.7M2
GLUCOSE SERPL-MCNC: 100 MG/DL (ref 70–99)
GLUCOSE UR STRIP-MCNC: NEGATIVE MG/DL
HCT VFR BLD AUTO: 34.6 % (ref 35–47)
HGB BLD-MCNC: 10.8 G/DL (ref 11.7–15.7)
HGB UR QL STRIP: ABNORMAL
IMM GRANULOCYTES # BLD: 0.1 10E9/L (ref 0–0.4)
IMM GRANULOCYTES NFR BLD: 0.8 %
INR PPP: 0.91 (ref 0.86–1.14)
IRON SATN MFR SERPL: 5 % (ref 15–46)
IRON SERPL-MCNC: 23 UG/DL (ref 35–180)
KETONES UR STRIP-MCNC: ABNORMAL MG/DL
LEUKOCYTE ESTERASE UR QL STRIP: NEGATIVE
LYMPHOCYTES # BLD AUTO: 6 10E9/L (ref 0.8–5.3)
LYMPHOCYTES NFR BLD AUTO: 38.2 %
MCH RBC QN AUTO: 26.7 PG (ref 26.5–33)
MCHC RBC AUTO-ENTMCNC: 31.2 G/DL (ref 31.5–36.5)
MCV RBC AUTO: 85 FL (ref 78–100)
MICROALBUMIN UR-MCNC: 25 MG/L
MICROALBUMIN/CREAT UR: 11.99 MG/G CR (ref 0–25)
MONOCYTES # BLD AUTO: 1.3 10E9/L (ref 0–1.3)
MONOCYTES NFR BLD AUTO: 8.3 %
NEUTROPHILS # BLD AUTO: 7.9 10E9/L (ref 1.6–8.3)
NEUTROPHILS NFR BLD AUTO: 50 %
NITRATE UR QL: NEGATIVE
NON-SQ EPI CELLS #/AREA URNS LPF: ABNORMAL /LPF
PH UR STRIP: 6 PH (ref 5–7)
PHOSPHATE SERPL-MCNC: 3.8 MG/DL (ref 2.5–4.5)
PLATELET # BLD AUTO: 538 10E9/L (ref 150–450)
PLATELET # BLD EST: ABNORMAL 10*3/UL
POTASSIUM SERPL-SCNC: 4 MMOL/L (ref 3.4–5.3)
PROT SERPL-MCNC: 8.4 G/DL (ref 6.8–8.8)
PROT UR-MCNC: 0.58 G/L
PROT/CREAT 24H UR: 0.28 G/G CR (ref 0–0.2)
RBC # BLD AUTO: 4.05 10E12/L (ref 3.8–5.2)
RBC #/AREA URNS AUTO: ABNORMAL /HPF
SODIUM SERPL-SCNC: 137 MMOL/L (ref 133–144)
SOURCE: ABNORMAL
SP GR UR STRIP: 1.02 (ref 1–1.03)
TIBC SERPL-MCNC: 447 UG/DL (ref 240–430)
UROBILINOGEN UR STRIP-ACNC: 1 EU/DL (ref 0.2–1)
WBC # BLD AUTO: 15.8 10E9/L (ref 4–11)
WBC #/AREA URNS AUTO: ABNORMAL /HPF

## 2017-08-15 PROCEDURE — 80069 RENAL FUNCTION PANEL: CPT | Performed by: CLINICAL NURSE SPECIALIST

## 2017-08-15 PROCEDURE — 81001 URINALYSIS AUTO W/SCOPE: CPT | Performed by: INTERNAL MEDICINE

## 2017-08-15 PROCEDURE — 82728 ASSAY OF FERRITIN: CPT | Performed by: CLINICAL NURSE SPECIALIST

## 2017-08-15 PROCEDURE — 85025 COMPLETE CBC W/AUTO DIFF WBC: CPT | Performed by: CLINICAL NURSE SPECIALIST

## 2017-08-15 PROCEDURE — 83516 IMMUNOASSAY NONANTIBODY: CPT | Performed by: CLINICAL NURSE SPECIALIST

## 2017-08-15 PROCEDURE — 84075 ASSAY ALKALINE PHOSPHATASE: CPT | Performed by: CLINICAL NURSE SPECIALIST

## 2017-08-15 PROCEDURE — 82247 BILIRUBIN TOTAL: CPT | Performed by: CLINICAL NURSE SPECIALIST

## 2017-08-15 PROCEDURE — 83516 IMMUNOASSAY NONANTIBODY: CPT | Mod: 91 | Performed by: CLINICAL NURSE SPECIALIST

## 2017-08-15 PROCEDURE — 84450 TRANSFERASE (AST) (SGOT): CPT | Performed by: CLINICAL NURSE SPECIALIST

## 2017-08-15 PROCEDURE — 82248 BILIRUBIN DIRECT: CPT | Performed by: CLINICAL NURSE SPECIALIST

## 2017-08-15 PROCEDURE — 83540 ASSAY OF IRON: CPT | Performed by: CLINICAL NURSE SPECIALIST

## 2017-08-15 PROCEDURE — 85610 PROTHROMBIN TIME: CPT | Performed by: CLINICAL NURSE SPECIALIST

## 2017-08-15 PROCEDURE — 36415 COLL VENOUS BLD VENIPUNCTURE: CPT | Performed by: CLINICAL NURSE SPECIALIST

## 2017-08-15 PROCEDURE — 82043 UR ALBUMIN QUANTITATIVE: CPT | Performed by: INTERNAL MEDICINE

## 2017-08-15 PROCEDURE — 84155 ASSAY OF PROTEIN SERUM: CPT | Performed by: CLINICAL NURSE SPECIALIST

## 2017-08-15 PROCEDURE — 82390 ASSAY OF CERULOPLASMIN: CPT | Performed by: CLINICAL NURSE SPECIALIST

## 2017-08-15 PROCEDURE — 82103 ALPHA-1-ANTITRYPSIN TOTAL: CPT | Performed by: CLINICAL NURSE SPECIALIST

## 2017-08-15 PROCEDURE — 84156 ASSAY OF PROTEIN URINE: CPT | Performed by: INTERNAL MEDICINE

## 2017-08-15 PROCEDURE — 83550 IRON BINDING TEST: CPT | Performed by: CLINICAL NURSE SPECIALIST

## 2017-08-15 PROCEDURE — 84460 ALANINE AMINO (ALT) (SGPT): CPT | Performed by: CLINICAL NURSE SPECIALIST

## 2017-08-16 ENCOUNTER — TELEPHONE (OUTPATIENT)
Dept: FAMILY MEDICINE | Facility: CLINIC | Age: 32
End: 2017-08-16

## 2017-08-16 LAB
MITOCHONDRIA M2 IGG SER-ACNC: <1 U/ML
TTG IGA SER-ACNC: 1 U/ML
TTG IGG SER-ACNC: <1 U/ML

## 2017-08-16 NOTE — LETTER
Divina Delgadillo  [address] [address] [place] [id number]          To Whom This May Concern:      Divina Delgadillo had a INR done on 8/15/17 with a result of .91.  She should be able to safely have her tooth extraction with minimal bleeding.

## 2017-08-16 NOTE — TELEPHONE ENCOUNTER
Jaleesa,    I have started the letter for your completion with the current INR of 0.91.  Thank you, Debora LOPEZ RN

## 2017-08-16 NOTE — TELEPHONE ENCOUNTER
Reason for Call:  Other Letter    Detailed comments:  Pt's Foster Mother Lorin calling.  Pt has been off of Coumadin (and does not need to go back on med) since June and she needs some teeth pulled.  She had her INR checked yesterday, (because the dentist would not pull her teeth without a recent INR result) and she was called this am and told that it was a normal INR.  Dentist is requiring a letter that states that INR is normal and that he can proceed with puling teeth.  Fax letter to:  Fax [phone] Dr. Concepcion and let Lorin know when letter has been faxed.      Phone Number Patient can be reached at: Other phone number:  [phone] - Lorin Foster Mother     Best Time: any    Can we leave a detailed message on this number? YES    Call taken on 8/16/2017 at 3:27 PM by Dolly Lundberg

## 2017-08-17 LAB
A1AT SERPL-MCNC: 116 MG/DL (ref 80–200)
CERULOPLASMIN SERPL-MCNC: 25 MG/DL (ref 23–53)

## 2017-08-18 LAB — SMA IGG SER-ACNC: 8 UNITS (ref 0–19)

## 2017-08-21 DIAGNOSIS — N18.30 TYPE 2 DIABETES MELLITUS WITH STAGE 3 CHRONIC KIDNEY DISEASE, WITHOUT LONG-TERM CURRENT USE OF INSULIN (H): Primary | ICD-10-CM

## 2017-08-21 DIAGNOSIS — E11.22 TYPE 2 DIABETES MELLITUS WITH STAGE 3 CHRONIC KIDNEY DISEASE, WITHOUT LONG-TERM CURRENT USE OF INSULIN (H): Primary | ICD-10-CM

## 2017-08-21 DIAGNOSIS — K21.9 GASTROESOPHAGEAL REFLUX DISEASE WITHOUT ESOPHAGITIS: ICD-10-CM

## 2017-08-21 DIAGNOSIS — J30.2 CHRONIC SEASONAL ALLERGIC RHINITIS, UNSPECIFIED TRIGGER: ICD-10-CM

## 2017-08-21 NOTE — TELEPHONE ENCOUNTER
Glipizide         Last Written Prescription Date: 06/16/17  Last Fill Quantity: 60, # refills: 2  Last Office Visit with G, P or [facility] prescribing provider:  06/07/17   Next 5 appointments (look out 90 days)     Nov 09, 2017  3:00 PM CST   Return Visit with Jeremiah Perdomo MD   [facility]    [address] [place] [address] [phone]                   BP Readings from Last 3 Encounters:   07/27/17 131/67   07/26/17 129/66   07/14/17 133/66     Lab Results   Component Value Date    MICROL 25 08/15/2017     Lab Results   Component Value Date    UMALCR 11.99 08/15/2017     Creatinine   Date Value Ref Range Status   08/15/2017 1.43 (H) 0.52 - 1.04 mg/dL Final   ]  GFR Estimate   Date Value Ref Range Status   08/15/2017 43 (L) >60 mL/min/1.7m2 Final     Comment:     Non  GFR Calc   07/24/2017 43 (L) >60 mL/min/1.7m2 Final     Comment:     Non  GFR Calc   06/19/2017 42 (L) >60 mL/min/1.7m2 Final     Comment:     Non  GFR Calc     GFR Estimate If Black   Date Value Ref Range Status   08/15/2017 52 (L) >60 mL/min/1.7m2 Final     Comment:      GFR Calc   07/24/2017 53 (L) >60 mL/min/1.7m2 Final     Comment:      GFR Calc   06/19/2017 50 (L) >60 mL/min/1.7m2 Final     Comment:      GFR Calc     Lab Results   Component Value Date    CHOL 300 06/15/2017     Lab Results   Component Value Date    HDL 72 06/15/2017     Lab Results   Component Value Date     06/15/2017     Lab Results   Component Value Date    TRIG 179 06/15/2017     Lab Results   Component Value Date    CHOLHDLRATIO 2.0 03/12/2009     Lab Results   Component Value Date    AST 36 08/15/2017     Lab Results   Component Value Date    ALT 59 08/15/2017     Lab Results   Component Value Date    A1C 7.8 06/15/2017    A1C 6.6 02/27/2017    A1C 6.1 03/12/2009    A1C 6.2 07/21/2008    A1C 6.1  06/27/2008     Potassium   Date Value Ref Range Status   08/15/2017 4.0 3.4 - 5.3 mmol/L Final   Omeprazole     Last Written Prescription Date: 04/13/17  Last Fill Quantity: 60,  # refills: 5   Last Office Visit with [facility], [facility] or [facility] prescribing provider: 06/07/17                                         Next 5 appointments (look out 90 days)     Nov 09, 2017  3:00 PM CST   Return Visit with Jeremiah Perdomo MD   [facility]    [address] [place] [address]   [phone]              Montelukast      Last Written Prescription Date: 04/13/17  Last Fill Quantity: 90, # refills: 1    Last Office Visit with [facility] or [facility] prescribing provider:  06/07/17   Future Office Visit:    Next 5 appointments (look out 90 days)     Nov 09, 2017  3:00 PM CST   Return Visit with Jeremiah Perdomo MD   [facility]    [address] [place] [address]   [phone]                   Date of Last Asthma Action Plan Letter:   There are no preventive care reminders to display for this patient.   Asthma Control Test: No flowsheet data found.    Date of Last Spirometry Test:   No results found for this or any previous visit.

## 2017-08-24 ENCOUNTER — HOSPITAL ENCOUNTER (OUTPATIENT)
Dept: NUTRITION | Facility: CLINIC | Age: 32
Discharge: HOME | End: 2017-08-24
Attending: NURSE PRACTITIONER | Admitting: NURSE PRACTITIONER
Payer: MEDICARE

## 2017-08-24 ENCOUNTER — TELEPHONE (OUTPATIENT)
Dept: GASTROENTEROLOGY | Facility: CLINIC | Age: 32
End: 2017-08-24

## 2017-08-24 DIAGNOSIS — K75.81 NASH (NONALCOHOLIC STEATOHEPATITIS): Primary | ICD-10-CM

## 2017-08-24 DIAGNOSIS — D50.8 OTHER IRON DEFICIENCY ANEMIA: ICD-10-CM

## 2017-08-24 PROCEDURE — 97802 MEDICAL NUTRITION INDIV IN: CPT | Performed by: DIETITIAN, REGISTERED

## 2017-08-24 RX ORDER — FERROUS SULFATE 325(65) MG
325 TABLET ORAL
Qty: 30 TABLET | Refills: 2 | Status: SHIPPED | OUTPATIENT
Start: 2017-08-24 | End: 2018-01-09

## 2017-08-24 NOTE — TELEPHONE ENCOUNTER
From: Sánchez Dias MD      Sent: 8/8/2017   8:04 AM        To: Jeremiah Perdomo MD, Shelley Vail RN     If the study is necessary, then we just do it. I typically have my patients hydrate well and liberalize their salt intake the night prior to the CT. They just need to be educated on the potential risk to their kidneys. Also should get a repeat kidney test about 3-4 days after to assure stable. I would also have her hold her chlorthalidone the day fo the study. Its all about risk/benefit. Let me knwo if I can help with this at all.   Sánchez       ----- Message -----      From: Jeremiah Perdomo MD      Sent: 8/6/2017   9:20 PM        To: Sánchez Dias MD     Hello,   I was thinking of ordering a CT abd/pelvis for our mutual to follow her pancreas after cancer resection.   What are your thoughts re contrast in these situations?   The CT is not urgent, and certainly can wait for sometime.     Thanks.         Jeremiah Perdomo MD Frischmon, Amanda R, RN                   Liver enzymes and function are improving.   She has significant iron deficiency anemia and would benefit from iron supplementation. Could be in the form on oral or IV infusion x 2 (as in with the other pt.).   That's not too urgent and we can address it after the CT.

## 2017-08-24 NOTE — TELEPHONE ENCOUNTER
Nurse called Lorin the  of patient and went over Dr. Perdomo's and Dr. Dias's recommendations for the CT scan. Also updated her that Dr. Perdomo would like her to take an iron supplement either oral or IV for her anemia. Foster mother verbalized understanding and will schedule the CT and would prefer an iron supplement over IV. Nurse transferred Lorin to the imaging scheduling line to schedule CT and informed her that the Nephrology RN would be in touch with her to go over pre CT hydration instructions.  Will request that Dr. Perdomo send a Rx for Iron to pharmacy.  Sue Carter RN, BSN  [facility]  GI/Gen Surg/Hepatology Care Coordinator

## 2017-08-25 NOTE — PROGRESS NOTES
[facility]  Medical Nutrition Therapy    Visit Type: Initial Assessment    Divina Delgadillo referred by Dr. Perdomo for MNT related to Type 2 Diabetes, SAHNI, CKD.    Patient accompanied by foster motherLorin.    Nutrition Assessment:  Anthropometrics  Wt Readings from Last 10 Encounters:   07/27/17 87.4 kg (192 lb 9.6 oz)   07/26/17 84.8 kg (187 lb)   07/14/17 85 kg (187 lb 6.4 oz)   07/10/17 84.4 kg (186 lb 1.6 oz)   06/15/17 85.5 kg (188 lb 6.4 oz)   06/07/17 85.5 kg (188 lb 8 oz)   05/17/17 84.6 kg (186 lb 8 oz)   05/01/17 85.3 kg (188 lb)   04/13/17 85.4 kg (188 lb 3.2 oz)   03/27/17 85.3 kg (188 lb)     Nutrition History  Meal/Snack Patterns: Most of the time eats 3 meals/day, snacks in between, reports skipping meals at times  Factors Affecting Nutritional Intake: Sore mouth, chewing difficulties/inability to chew food (recently had teeth pulled and is schedule to have more pulled)  Caregiver / : Present  Level of Knowledge: No previous knowledge  Beliefs and Attitudes: Readiness to change nutrition-related behaviors  Willingness to Learn: Acceptance    Physical Activity  Physical Activity History: Rides bike and walks  Frequency (times/week): 4 (times/week)  Duration (minutes/day): 20 minutes/day  Intensity: Moderate    Nutrition Prescription  Energy:  1575 - 1890 kcals      Protein:  63 g         Fluid:  1 mL/kcal      Fat:  Limit saturated fats, avoid trans fats       Carbohydrate:  3 - 4 choices (45 - 60 grams)          Micronutrient:  Iron as directed by Dr. Perdomo             Food Record  Breakfast: toast, sugary cereal, oatmeal, or waffles; milk or water to drink   Lunch: sandwich, yogurt, fruit; pop to drink   Snack: chips or toast   Dinner: meat, potatoes, and/or vegetables   Snack: Ramen noodles or donuts     Foster motherLorin, does the meal prep. Patient reports eating a lot of fast food. Drinks 1 - 2 cans or bottles of pop daily. Trying to drink more  water.    Nutrition Diagnosis:  Problem: Food and nutrition-related knowledge deficit  Etiology: Type 2 Diabetes   Signs and Symptoms: Reports no previous education/knowledge    Nutrition Intervention:  Nutrition Education: Comprehensive  -Educated patient on carbohydrate counting. Recommended 3 - 4 carbohydrate choices per meal; patient is sometimes getting > 8 carbohydrate choices per meal when eating out at fast food.   -Provided patient with Carbohydrate Counting book. Discussed foods that have carbohydrates, label reading, and the amounts of carbohydrates in different foods.   -Discussed the amount of carbohydrates in sugary beverages. Brainstormed ideas for non-sugary beverages.    Meals and Snacks: Modify distribution, type or amount of food and nutrients within meals or at specified time  -Recommend that patient refrain from skipping meals. Discussed the importance of consistent carbohydrate intake.    Collaboration/referral to other providers  -Recommend following up with a CDE to discuss diabetes management in more detail.    Nutrition Goals:  1. Do not skip meals  2. Aim for 3 - 4 carbohydrate choices at meals and 0 - 2 carbohydrate choices at snacks  3. Continue with activity  4. Try some of the beverage alternatives with less sugar    Nutrition Follow Up / Monitoring:  Meal/snack/beverage composition; Biochemical data; Eating patterns; Physical activity     Nutrition Recommendations:  Patient to follow-up with RD in 4 - 8 weeks.  Patient has RD contact information to call/email if needed.    Kylie Maxwell RDN, LD  Clinical Dietitian  [phone]    Start time 1350  End time 1450

## 2017-08-29 ENCOUNTER — TELEPHONE (OUTPATIENT)
Dept: NEPHROLOGY | Facility: CLINIC | Age: 32
End: 2017-08-29

## 2017-08-29 DIAGNOSIS — N18.30 CKD (CHRONIC KIDNEY DISEASE) STAGE 3, GFR 30-59 ML/MIN (H): Primary | ICD-10-CM

## 2017-08-29 NOTE — TELEPHONE ENCOUNTER
Patient is scheduled for CT scan w or w/o contrast ordered by Dr. Muñoz on 9/1.    Dr. Dias advises the following:  - Hold Chlorthalidone day of the study  - Liberalize sodium evening prior to the CT  - Hydrate well evening prior, day of and following CT scan  - repeat bmp 3-4 days after CT scan    Detailed message left for Lorin regarding this information. Advised that she call back with any questions.    Shelley Vail RN, BSN  Nephrology Care Coordinator  [facility]

## 2017-08-30 ENCOUNTER — CARE COORDINATION (OUTPATIENT)
Dept: CARE COORDINATION | Facility: CLINIC | Age: 32
End: 2017-08-30

## 2017-08-30 NOTE — PROGRESS NOTES
Clinic Care Coordination Contact  OUTREACH    Referral Information:  Referral Source: PCP  Reason for Contact: Follow up  Care Conference: No     Universal Utilization:   ED Visits in last year: 0  Hospital visits in last year: 0  Last PCP appointment: 06/15/17  Missed Appointments: 0  Multiple Providers or Specialists: nephrology, Gyn. gastro    Clinical Concerns:  Current Medical Concerns:Patient needs to have CT at request of GI. GI/Nephrology have discussed and formulated a plan   Current Behavioral Concerns: MH issues-patient is in foster home  Education Provided to patient:CT before/after instructions  Clinical Pathway: None    Medication Management:  Lorin foster mother, sets up and administers meds with assist of her staff. Has no questions or concerns. We discussed medication that is to be held the day pof CT. Olrin verbalized understanding.    Functional Status:  Mobility Status: Independent  Equipment Currently Used at Home: none  Transportation: Lorin foster mother     Psychosocial:  Current living arrangement: I live in a private home (Foster Home) Has foster mother, Lorin Knight, who is in charge of her care day to day. Has a court appointed guardian through the state (Guardian is Lorin Schilling-through [facility])  Financial/Insurance: No concerns     Resources and Interventions:  Current Resources: Other (Comments) (Foster Care); Group Home (Foster Care)  Advanced Care Plans/Directives on file: No     Goals:   Goal 1 Statement: I will call RN CC with questions or when needing support  Goal 1 Progression Percent: 80%  Goal 1 Progression Date: 08/30/17  Goal 2 Statement: I will follow pre CT instuctions as given  Goal 2 Progression Percent: 50%  Goal 2 Progression Date: 08/30/17     Barriers: Patients medications and complex issues can be overwhelming for foster mother at times  Strengths: Foster mother sought out care coordination  Patient/Caregiver understanding: Lorin reports that things have been going well.  Reports she has been meaning to give me a call R/T upcoming CT (9/1/17) instructions. A message was left by Nephrologist's office but she wants to be certain she understands.Went through instructions per note in chart regarding message left. She verbalized understanding. Was also concerned that order for post lab draw was there. Able to see the order, communicated to Lorin. She will make that appointment. Lorin expressed relief that there is a CC she can call. Reports feeling overwhelmed when she first took on patient's care but that reassurance and direction of CC has helped.   Frequency of Care Coordination: every 1-4 weeks  Upcoming appointment: 11/09/17 (GI-no PCP)     Plan: RN Cc will f/u in 3-4 weeks.    Jose De Jesus COTA,RN- BC  Clinic Care Coordinator  [facility]  Phone: [phone]

## 2017-08-31 RX ORDER — MONTELUKAST SODIUM 10 MG/1
10 TABLET ORAL AT BEDTIME
Qty: 90 TABLET | Refills: 1 | Status: SHIPPED | OUTPATIENT
Start: 2017-08-31 | End: 2018-02-05

## 2017-08-31 RX ORDER — GLIPIZIDE 10 MG/1
10 TABLET ORAL
Qty: 60 TABLET | Refills: 2 | Status: SHIPPED | OUTPATIENT
Start: 2017-08-31 | End: 2018-01-30

## 2017-08-31 RX ORDER — IOPAMIDOL 755 MG/ML
100 INJECTION, SOLUTION INTRAVASCULAR ONCE
Status: COMPLETED | OUTPATIENT
Start: 2017-09-01 | End: 2017-09-01

## 2017-08-31 NOTE — TELEPHONE ENCOUNTER
Patient's foster mother is called with the refill and the need to be seen in Sept. Debora LOPEZ RN

## 2017-08-31 NOTE — TELEPHONE ENCOUNTER
Routing refill request to provider for review/approval because:  Labs out of range:  See below    Ritika Davidson RN

## 2017-09-01 ENCOUNTER — HOSPITAL ENCOUNTER (OUTPATIENT)
Dept: CT IMAGING | Facility: CLINIC | Age: 32
Discharge: HOME | End: 2017-09-01
Attending: INTERNAL MEDICINE | Admitting: INTERNAL MEDICINE
Payer: MEDICARE

## 2017-09-01 DIAGNOSIS — N18.30 CKD (CHRONIC KIDNEY DISEASE) STAGE 3, GFR 30-59 ML/MIN (H): ICD-10-CM

## 2017-09-01 DIAGNOSIS — Z79.899 MEDICATION MANAGEMENT: ICD-10-CM

## 2017-09-01 DIAGNOSIS — K75.81 NASH (NONALCOHOLIC STEATOHEPATITIS): ICD-10-CM

## 2017-09-01 LAB
ANION GAP SERPL CALCULATED.3IONS-SCNC: 9 MMOL/L (ref 3–14)
BASOPHILS # BLD AUTO: 0.2 10E9/L (ref 0–0.2)
BASOPHILS NFR BLD AUTO: 0.9 %
BUN SERPL-MCNC: 21 MG/DL (ref 7–30)
CALCIUM SERPL-MCNC: 9.2 MG/DL (ref 8.5–10.1)
CHLORIDE SERPL-SCNC: 103 MMOL/L (ref 94–109)
CO2 SERPL-SCNC: 24 MMOL/L (ref 20–32)
CREAT BLD-MCNC: 1.6 MG/DL (ref 0.52–1.04)
CREAT SERPL-MCNC: 1.4 MG/DL (ref 0.52–1.04)
DIFFERENTIAL METHOD BLD: ABNORMAL
EOSINOPHIL # BLD AUTO: 0.3 10E9/L (ref 0–0.7)
EOSINOPHIL NFR BLD AUTO: 1.8 %
ERYTHROCYTE [DISTWIDTH] IN BLOOD BY AUTOMATED COUNT: 16.5 % (ref 10–15)
GFR SERPL CREATININE-BSD FRML MDRD: 38 ML/MIN/1.7M2
GFR SERPL CREATININE-BSD FRML MDRD: 44 ML/MIN/1.7M2
GLUCOSE SERPL-MCNC: 158 MG/DL (ref 70–99)
HCT VFR BLD AUTO: 34.5 % (ref 35–47)
HGB BLD-MCNC: 11 G/DL (ref 11.7–15.7)
IMM GRANULOCYTES # BLD: 0.2 10E9/L (ref 0–0.4)
IMM GRANULOCYTES NFR BLD: 0.9 %
LYMPHOCYTES # BLD AUTO: 4.8 10E9/L (ref 0.8–5.3)
LYMPHOCYTES NFR BLD AUTO: 28.3 %
MCH RBC QN AUTO: 27.3 PG (ref 26.5–33)
MCHC RBC AUTO-ENTMCNC: 31.9 G/DL (ref 31.5–36.5)
MCV RBC AUTO: 86 FL (ref 78–100)
MONOCYTES # BLD AUTO: 1.3 10E9/L (ref 0–1.3)
MONOCYTES NFR BLD AUTO: 7.4 %
NEUTROPHILS # BLD AUTO: 10.2 10E9/L (ref 1.6–8.3)
NEUTROPHILS NFR BLD AUTO: 60.7 %
PLATELET # BLD AUTO: 519 10E9/L (ref 150–450)
POTASSIUM SERPL-SCNC: 4 MMOL/L (ref 3.4–5.3)
RBC # BLD AUTO: 4.03 10E12/L (ref 3.8–5.2)
SODIUM SERPL-SCNC: 136 MMOL/L (ref 133–144)
WBC # BLD AUTO: 16.8 10E9/L (ref 4–11)

## 2017-09-01 PROCEDURE — 25000128 H RX IP 250 OP 636: Performed by: RADIOLOGY

## 2017-09-01 PROCEDURE — 25000125 ZZHC RX 250: Performed by: RADIOLOGY

## 2017-09-01 PROCEDURE — 80048 BASIC METABOLIC PNL TOTAL CA: CPT | Performed by: INTERNAL MEDICINE

## 2017-09-01 PROCEDURE — 82565 ASSAY OF CREATININE: CPT

## 2017-09-01 PROCEDURE — 74178 CT ABD&PLV WO CNTR FLWD CNTR: CPT

## 2017-09-01 PROCEDURE — 85025 COMPLETE CBC W/AUTO DIFF WBC: CPT | Performed by: INTERNAL MEDICINE

## 2017-09-01 PROCEDURE — 36415 COLL VENOUS BLD VENIPUNCTURE: CPT | Performed by: INTERNAL MEDICINE

## 2017-09-01 RX ADMIN — SODIUM CHLORIDE 80 ML: 9 INJECTION, SOLUTION INTRAVENOUS at 11:00

## 2017-09-01 RX ADMIN — IOPAMIDOL 100 ML: 755 INJECTION, SOLUTION INTRAVENOUS at 11:00

## 2017-09-05 DIAGNOSIS — Z79.899 MEDICATION MANAGEMENT: ICD-10-CM

## 2017-09-05 LAB
BASOPHILS # BLD AUTO: 0.1 10E9/L (ref 0–0.2)
BASOPHILS NFR BLD AUTO: 0.7 %
DIFFERENTIAL METHOD BLD: ABNORMAL
EOSINOPHIL # BLD AUTO: 0.3 10E9/L (ref 0–0.7)
EOSINOPHIL NFR BLD AUTO: 1.5 %
ERYTHROCYTE [DISTWIDTH] IN BLOOD BY AUTOMATED COUNT: 17.5 % (ref 10–15)
HCT VFR BLD AUTO: 33.4 % (ref 35–47)
HGB BLD-MCNC: 10.5 G/DL (ref 11.7–15.7)
LYMPHOCYTES # BLD AUTO: 5.8 10E9/L (ref 0.8–5.3)
LYMPHOCYTES NFR BLD AUTO: 30.3 %
MCH RBC QN AUTO: 27.3 PG (ref 26.5–33)
MCHC RBC AUTO-ENTMCNC: 31.4 G/DL (ref 31.5–36.5)
MCV RBC AUTO: 87 FL (ref 78–100)
MONOCYTES # BLD AUTO: 1.3 10E9/L (ref 0–1.3)
MONOCYTES NFR BLD AUTO: 6.7 %
NEUTROPHILS # BLD AUTO: 11.6 10E9/L (ref 1.6–8.3)
NEUTROPHILS NFR BLD AUTO: 60.8 %
PLATELET # BLD AUTO: 518 10E9/L (ref 150–450)
RBC # BLD AUTO: 3.84 10E12/L (ref 3.8–5.2)
WBC # BLD AUTO: 19.1 10E9/L (ref 4–11)

## 2017-09-05 PROCEDURE — 85025 COMPLETE CBC W/AUTO DIFF WBC: CPT | Performed by: CLINICAL NURSE SPECIALIST

## 2017-09-05 PROCEDURE — 36415 COLL VENOUS BLD VENIPUNCTURE: CPT | Performed by: CLINICAL NURSE SPECIALIST

## 2017-09-07 ENCOUNTER — OFFICE VISIT (OUTPATIENT)
Dept: FAMILY MEDICINE | Facility: CLINIC | Age: 32
End: 2017-09-07
Payer: MEDICARE

## 2017-09-07 VITALS
SYSTOLIC BLOOD PRESSURE: 135 MMHG | DIASTOLIC BLOOD PRESSURE: 65 MMHG | TEMPERATURE: 98.6 F | HEIGHT: 65 IN | HEART RATE: 91 BPM | WEIGHT: 192.6 LBS | BODY MASS INDEX: 32.09 KG/M2

## 2017-09-07 DIAGNOSIS — K21.9 GASTROESOPHAGEAL REFLUX DISEASE WITHOUT ESOPHAGITIS: ICD-10-CM

## 2017-09-07 DIAGNOSIS — E11.22 TYPE 2 DIABETES MELLITUS WITH STAGE 3 CHRONIC KIDNEY DISEASE, WITHOUT LONG-TERM CURRENT USE OF INSULIN (H): Primary | ICD-10-CM

## 2017-09-07 DIAGNOSIS — I10 ESSENTIAL HYPERTENSION: ICD-10-CM

## 2017-09-07 DIAGNOSIS — N18.30 TYPE 2 DIABETES MELLITUS WITH STAGE 3 CHRONIC KIDNEY DISEASE, WITHOUT LONG-TERM CURRENT USE OF INSULIN (H): Primary | ICD-10-CM

## 2017-09-07 LAB
GLUCOSE SERPL-MCNC: 133 MG/DL (ref 70–99)
HBA1C MFR BLD: 7.7 % (ref 4.3–6)

## 2017-09-07 PROCEDURE — 83036 HEMOGLOBIN GLYCOSYLATED A1C: CPT | Performed by: NURSE PRACTITIONER

## 2017-09-07 PROCEDURE — 36415 COLL VENOUS BLD VENIPUNCTURE: CPT | Performed by: NURSE PRACTITIONER

## 2017-09-07 PROCEDURE — 99214 OFFICE O/P EST MOD 30 MIN: CPT | Performed by: NURSE PRACTITIONER

## 2017-09-07 PROCEDURE — 82947 ASSAY GLUCOSE BLOOD QUANT: CPT | Performed by: NURSE PRACTITIONER

## 2017-09-07 RX ORDER — CHLORTHALIDONE 25 MG/1
12.5 TABLET ORAL DAILY
Qty: 45 TABLET | Refills: 1 | Status: SHIPPED | OUTPATIENT
Start: 2017-09-07 | End: 2018-01-09

## 2017-09-07 RX ORDER — AMLODIPINE BESYLATE 5 MG/1
5 TABLET ORAL DAILY
Qty: 90 TABLET | Refills: 1 | Status: SHIPPED | OUTPATIENT
Start: 2017-09-07 | End: 2018-02-05

## 2017-09-07 NOTE — PATIENT INSTRUCTIONS
Recheck diabetes today  Try to loose weight  Continue Glipizide 10 mg twice daily  Labs: A1C and glucose   Will update refills

## 2017-09-07 NOTE — NURSING NOTE
"Chief Complaint   Patient presents with     Refill Request     is unsure of which medications she needs refilled        Initial /65  Pulse 91  Temp 98.6  F (37  C) (Tympanic)  Ht 5' 4.5\" (1.638 m)  Wt 192 lb 9.6 oz (87.4 kg)  BMI 32.55 kg/m2 Estimated body mass index is 32.55 kg/(m^2) as calculated from the following:    Height as of this encounter: 5' 4.5\" (1.638 m).    Weight as of this encounter: 192 lb 9.6 oz (87.4 kg).  Medication Reconciliation: complete  "

## 2017-09-07 NOTE — PROGRESS NOTES
"  SUBJECTIVE:   Divina Delgadillo is a 32 year old female who presents to clinic today for the following health issues: medication management and diabetes recheck.  The patient reports that her home BG readings ranging from 130 to 160'  Fasting, she is on Glipizide and takes it as prescribed 10 mg twice daily, she gained about 10 lbs since Spring, her diabetes was better controlled until June this summer. I think her weight gain is causing negative effect on glycemic control.     Problem list and histories reviewed & adjusted, as indicated.  Additional history: as documented    Labs reviewed in EPIC    Reviewed and updated as needed this visit by clinical staff  Tobacco  Allergies  Med Hx  Surg Hx  Fam Hx  Soc Hx      Reviewed and updated as needed this visit by Provider         ROS:  Constitutional, HEENT, cardiovascular, pulmonary, gi and gu systems are negative, except as otherwise noted.      OBJECTIVE:   /65  Pulse 91  Temp 98.6  F (37  C) (Tympanic)  Ht 5' 4.5\" (1.638 m)  Wt 192 lb 9.6 oz (87.4 kg)  BMI 32.55 kg/m2  Body mass index is 32.55 kg/(m^2).  GENERAL: healthy, alert and no distress  RESP: lungs clear to auscultation - no rales, rhonchi or wheezes  CV: regular rate and rhythm, normal S1 S2, no S3 or S4, no murmur, click or rub, no peripheral edema and peripheral pulses strong  Diabetic foot exam: normal DP and PT pulses, no trophic changes or ulcerative lesions and normal sensory exam    Diagnostic Test Results:  Lab Results   Component Value Date    A1C 7.7 09/07/2017    A1C 7.8 06/15/2017    A1C 6.6 02/27/2017    A1C 6.1 03/12/2009    A1C 6.2 07/21/2008     ASSESSMENT/PLAN:     BMI:   Estimated body mass index is 32.55 kg/(m^2) as calculated from the following:    Height as of this encounter: 5' 4.5\" (1.638 m).    Weight as of this encounter: 192 lb 9.6 oz (87.4 kg).   Weight management plan: Discussed healthy diet and exercise guidelines and patient will follow up in 3 months in " clinic to re-evaluate.    1. Type 2 diabetes mellitus with stage 3 chronic kidney disease, without long-term current use of insulin (H)  -uncontrolled  -eating a lot of carbs, states she likes noodles and pizza  -discussed diabetic diet  -discussed weight loss  -she will try to exercise daily and follow healthy diet, her plan is to loose 2 lbs every 2 weeks   - Hemoglobin A1c  - Glucose  -continue Glipizide 10 mg twice daily   -follow up in 3 months     2. Essential hypertension  -well controlled   - amLODIPine (NORVASC) 5 MG tablet; Take 1 tablet (5 mg) by mouth daily  Dispense: 90 tablet; Refill: 1  - chlorthalidone (HYGROTON) 25 MG tablet; Take 0.5 tablets (12.5 mg) by mouth daily  Dispense: 45 tablet; Refill: 1    3. Gastroesophageal reflux disease without esophagitis  -stable, well controlled   - ranitidine (ZANTAC) 300 MG tablet; Take 1 tablet (300 mg) by mouth At Bedtime  Dispense: 90 tablet; Refill: 1    See Patient Instructions    VERONIQUE Spears [facility]

## 2017-09-07 NOTE — MR AVS SNAPSHOT
After Visit Summary   9/7/2017    Divina Delgadillo    MRN: [id number]           Patient Information     Date Of Birth          1985        Visit Information        Provider Department      9/7/2017 9:40 AM Jaleesa Velasquez APRN CNP [facility]        Today's Diagnoses     Type 2 diabetes mellitus with stage 3 chronic kidney disease, without long-term current use of insulin (H)    -  1      Care Instructions    Recheck diabetes today  Try to loose weight  Will update refills                   Follow-ups after your visit        Your next 10 appointments already scheduled     Dec 14, 2017  2:40 PM CST   [facility] with Jeremiah [facility]o MD   [facility]    [id number] [place] [id number] [phone]            Jan 09, 2018  9:30 AM CST   LAB with LAB FIRST FLOOR [facility])    [id number] [place] [id number] [phone]           Patient must bring picture ID. Patient should be prepared to give a urine specimen  Please do not eat 10-12 hours before your appointment if you are coming in fasting for labs on lipids, cholesterol, or glucose (sugar). Pregnant women should follow their Care Team instructions. Water with medications is okay. Do not drink coffee or other fluids. If you have concerns about taking  your medications, please ask at office or if scheduling via [employer], send a message by clicking on Secure Messaging, Message Your Care Team.            Jan 09, 2018 10:00 AM CST   Return Visit with Sánchez Dias MD   [facility])    [address] [place] [id number] [phone]              Who to contact     If you have questions or need follow up information about today's clinic visit or your schedule please contact [facility] directly at  "[phone].  Normal or non-critical lab and imaging results will be communicated to you by MyChart, letter or phone within 4 business days after the clinic has received the results. If you do not hear from us within 7 days, please contact the clinic through [employer] or phone. If you have a critical or abnormal lab result, we will notify you by phone as soon as possible.  Submit refill requests through [employer] or call your pharmacy and they will forward the refill request to us. Please allow 3 business days for your refill to be completed.          Additional Information About Your Visit        [employer] Information     [employer] lets you send messages to your doctor, view your test results, renew your prescriptions, schedule appointments and more. To sign up, go to [email or web address] . Click on \"Log in\" on the left side of the screen, which will take you to the Welcome page. Then click on \"Sign up Now\" on the right side of the page.     You will be asked to enter the access code listed below, as well as some personal information. Please follow the directions to create your username and password.     Your access code is: [email or web address]  Expires: 2017 10:06 AM     Your access code will  in 90 days. If you need help or a new code, please call your [place] clinic or [phone].        Care EveryWhere ID     This is your Care EveryWhere ID. This could be used by other organizations to access your [place] medical records  [id number]        Your Vitals Were     Pulse Temperature Height BMI (Body Mass Index)          91 98.6  F (37  C) (Tympanic) 5' 4.5\" (1.638 m) 32.55 kg/m2         Blood Pressure from Last 3 Encounters:   17 135/65   17 131/67   17 129/66    Weight from Last 3 Encounters:   17 192 lb 9.6 oz (87.4 kg)   17 192 lb 9.6 oz (87.4 kg)   17 187 lb (84.8 kg)              We Performed the Following     Glucose     Hemoglobin A1c        Primary Care Provider " Office Phone # Fax #    VERONIQUE Bledsoe [phone] [address] [facility] [address]        Equal Access to Services     FANG HAY : Hadii juan manuel ku hadyennifero Soomaali, waaxda luqadaha, qaybta kaalmada adeegyada, aaron michaelsn neha sy juwan munoz. So [facility] [phone].    ATENCIÓN: Si habla español, tiene a gil disposición servicios gratuitos de asistencia lingüística. Llame al [phone].    We comply with applicable federal civil rights laws and [place] laws. We do not discriminate on the basis of race, color, national origin, age, disability sex, sexual orientation or gender identity.            Thank you!     Thank you for choosing [facility]  for your care. Our goal is always to provide you with excellent care. Hearing back from our patients is one way we can continue to improve our services. Please take a few minutes to complete the written survey that you may receive in the mail after your visit with us. Thank you!             Your Updated Medication List - Protect others around you: Learn how to safely use, store and throw away your medicines at www.disposemymeds.org.          This list is accurate as of: 9/7/17 10:06 AM.  Always use your most recent med list.                   Brand Name Dispense Instructions for use Diagnosis    ABILIFY 30 MG tablet   Generic drug:  ARIPiprazole      Take 30 mg by mouth daily        amLODIPine 5 MG tablet    NORVASC    90 tablet    Take 1 tablet (5 mg) by mouth daily    Essential hypertension       blood glucose lancets standard    no brand specified    1    needs lancet     Type II or unspecified type diabetes mellitus without mention of complication, not stated as uncontrolled, Mild persistent asthma       blood glucose monitoring test strip    no brand specified    100 each    100 strips by In Vitro route daily Use to test blood sugar 1 times daily  And as needed ACCU Check Haylee Plus Test  Strips    Type 2 diabetes mellitus with stage 3 chronic kidney disease, without long-term current use of insulin (H)       chlorthalidone 25 MG tablet    HYGROTON    30 tablet    Take 0.5 tablets (12.5 mg) by mouth daily    Essential hypertension       Clozapine 200 MG tablet    CLOZARIL     Take 200 mg by mouth 2 times daily        ferrous sulfate 325 (65 FE) MG tablet    IRON    30 tablet    Take 1 tablet (325 mg) by mouth daily (with breakfast)    Other iron deficiency anemia       FLUoxetine 20 MG capsule    PROzac     Take 20 mg by mouth daily        glipiZIDE 10 MG tablet    GLUCOTROL    60 tablet    Take 1 tablet (10 mg) by mouth 2 times daily (before meals)    Type 2 diabetes mellitus with stage 3 chronic kidney disease, without long-term current use of insulin (H)       loratadine 10 MG tablet    CLARITIN    90 tablet    Take 1 tablet (10 mg) by mouth every morning    Chronic seasonal allergic rhinitis, unspecified trigger       MELATONIN PO      Take 3 mg by mouth nightly as needed        metroNIDAZOLE 500 MG tablet    FLAGYL    14 tablet    Take 1 tablet (500 mg) by mouth 2 times daily    Bacterial vaginitis       montelukast 10 MG tablet    SINGULAIR    90 tablet    Take 1 tablet (10 mg) by mouth At Bedtime        naltrexone 50 MG tablet    DEPADE;REVIA     Take 50 mg by mouth every morning        omeprazole 20 MG CR capsule    priLOSEC    60 capsule    1 capsule bid    Gastroesophageal reflux disease without esophagitis       ranitidine 300 MG tablet    ZANTAC    90 tablet    Take 1 tablet (300 mg) by mouth At Bedtime    Gastroesophageal reflux disease without esophagitis       Vitamin D (Cholecalciferol) 1000 UNITS Tabs     90 tablet    Take 1 tablet by mouth daily    Vitamin D deficiency

## 2017-09-14 ENCOUNTER — TELEPHONE (OUTPATIENT)
Dept: FAMILY MEDICINE | Facility: CLINIC | Age: 32
End: 2017-09-14

## 2017-09-14 NOTE — TELEPHONE ENCOUNTER
Patient calling  To inquire  About  carbs  In her daily diet   Please call and advise  Danelle Schilling- CSS

## 2017-09-14 NOTE — TELEPHONE ENCOUNTER
I spoke with pt and informed that these instructions are usually provided by our diabetic educators.  Pt will call diabetic ed tomorrow.  Odalys Mattson RN

## 2017-09-19 DIAGNOSIS — N18.30 CKD (CHRONIC KIDNEY DISEASE) STAGE 3, GFR 30-59 ML/MIN (H): ICD-10-CM

## 2017-09-19 PROCEDURE — 36415 COLL VENOUS BLD VENIPUNCTURE: CPT | Performed by: INTERNAL MEDICINE

## 2017-09-19 PROCEDURE — 80069 RENAL FUNCTION PANEL: CPT | Performed by: INTERNAL MEDICINE

## 2017-09-20 LAB
ALBUMIN SERPL-MCNC: 3.4 G/DL (ref 3.4–5)
ANION GAP SERPL CALCULATED.3IONS-SCNC: 9 MMOL/L (ref 3–14)
BUN SERPL-MCNC: 30 MG/DL (ref 7–30)
CALCIUM SERPL-MCNC: 9.1 MG/DL (ref 8.5–10.1)
CHLORIDE SERPL-SCNC: 106 MMOL/L (ref 94–109)
CO2 SERPL-SCNC: 23 MMOL/L (ref 20–32)
CREAT SERPL-MCNC: 1.61 MG/DL (ref 0.52–1.04)
GFR SERPL CREATININE-BSD FRML MDRD: 37 ML/MIN/1.7M2
GLUCOSE SERPL-MCNC: 169 MG/DL (ref 70–99)
PHOSPHATE SERPL-MCNC: 3.5 MG/DL (ref 2.5–4.5)
POTASSIUM SERPL-SCNC: 3.9 MMOL/L (ref 3.4–5.3)
SODIUM SERPL-SCNC: 138 MMOL/L (ref 133–144)

## 2017-09-22 ENCOUNTER — CARE COORDINATION (OUTPATIENT)
Dept: CARE COORDINATION | Facility: CLINIC | Age: 32
End: 2017-09-22

## 2017-09-22 NOTE — PROGRESS NOTES
Clinic Care Coordination Contact  [facility]/Voicemail    Referral Source: PCP  Clinical Data: Care Coordinator Outreach  Outreach attempted x 1 since last contact.  Left message on voicemail with call back information and requested return call.  Plan: Care Coordinator mailed out care coordination introduction letter on 3/28/17. Care Coordinator will f/u in 2-3 weeks.  Jose De Jesus COTA,RN- BC  Clinic Care Coordinator  [facility]  Phone: [phone]

## 2017-10-09 ENCOUNTER — CARE COORDINATION (OUTPATIENT)
Dept: CARE COORDINATION | Facility: CLINIC | Age: 32
End: 2017-10-09

## 2017-10-09 NOTE — PROGRESS NOTES
Clinic Care Coordination Contact  OUTREACH    Referral Information:  Referral Source: PCP  Reason for Contact: Follow up  Care Conference: No     Universal Utilization:   ED Visits in last year: 0  Hospital visits in last year: 0  Last PCP appointment: 06/15/17  Missed Appointments: 0  Multiple Providers or Specialists: nephrology, Gyn. gastro    Clinical Concerns:  Current Medical Concerns: Continues to have A1C greater than 7. She has also been gaining weight (192.9# up from 186.1# in July). Admits to liking high carb foods.   Current Behavioral Concerns: MH issues (bipolar), now has [facility] worker. Living in foster home for support.  Education Provided to patient: Low carb diet (has been to diabetes educator), losing weight   Clinical Pathway: Clinic Care Coordination - Diabetes Pathway    Patient's diabetes management related comments/concerns: Healthy Eating and Being Active.    Patient's emotional response to diabetes: expresses readiness to learn    ASSESSMENT:  Patient Problem List and Family Medical History reviewed for relevant medical history, current medical status, and diabetes risk factors.    Current Diabetes Management per Patient:  Taking diabetes medications?   yes:     Diabetes Medication(s)     Sulfonylureas Sig    glipiZIDE (GLUCOTROL) 10 MG tablet Take 1 tablet (10 mg) by mouth 2 times daily (before meals)        Past Diabetes Education: Yes    BG values are: Not in goal  Patient's most recent   Lab Results   Component Value Date    A1C 7.7 09/07/2017    is not meeting goal of <7.0  A1C 7.7 09/07/2017      A1C 7.8 06/15/2017     A1C 6.6 02/27/2017     A1C 6.1 03/12/2009     A1C 6.2 07/21/2008        Medication Management:  Lorin, foster mother, sets up and administers meds with assist of her staff. Has no questions or concerns.    Functional Status:  Mobility Status: Independent  Transportation: Foster mother     Psychosocial:  Current living arrangement:: I live in a private home (Foster  Home)  Has foster mother, Lorin Knight, who is in charge of her care day to day. Has a court appointed guardian through the state (Guardian is Lorin Schilling-through [facility]). Recently got an [facility] worker, Lucia Caicedo  Financial/Insurance:No concerns     Resources and Interventions:  Current Resources: Other (Comments) (Foster Care); Group Home, [facility] Worker, Other (see comment) (Foster Care, now has [facility] worker)  Advanced Care Plans/Directives on file:No        Goals:   Goal 1 Statement: I will call RN CC with questions or when needing support  Goal 1 Progression Percent: 90%  Goal 1 Progression Date: 10/09/17  Goal 2 Statement: I will follow pre CT instuctions as given  Goal 2 Progression Percent: 100%  Goal 2 Progression Date: 10/09/17  Goal 3 Statement: I will not snack on high carb foods  Goal 3 Progression Percent: 60%  Goal 3 Progression Date: 10/09/17  Goal 4 Statement: I will get my A1C back into the 6's by my next PCP f/u  Goal 4 Progression Percent: 10%  Goal 4 Progression Date: 10/09/17     Barriers: Patient lives in foster care and meals are providee. However, she goes out and about and eats high carb foods and drinks.  Strengths: Has desire to learn carb counting and to lose weight  Patient/Caregiver understanding: Spoke with patient's foster mom, Lorin. She reports that they came for diabetic education and had a PCP appointment. Patient expresses desire to lose weight and to get BG/A1C under control. However, per Lorin, she just doesn't seem to care. She is eating a lot of bread and other high carb foods and drinks. Her PCP did discuss BG control and weight loss with her nbut per Lorin, she isn't really adhering to diet like she should. Lorin plans to bring her back to PCP in Dec. Is aware that dietician said to f/u in 4-8 weeks (appointment was 8/24). Although controlling patient's behavior to help her control her diabetes is difficult, lorin reports that they recently had a meeting with her [facility] team and  they were happy with how much pascual has progressed and stabilized since being in Lorin's care. Lorin reports that Pascual recently got an [facility] worker which has helped.  Frequency of Care Coordination: every 1-4 weeks  Upcoming appointment: 11/09/17 (GI-no PCP)     Plan: RN CC will f/u in 3-4 weeks unless there is an issue prior.    Jose De Jesus COTA,RN- BC  Clinic Care Coordinator  [facility]  Phone: [phone]

## 2017-10-09 NOTE — LETTER
[facility]  Complex Care Plan  About Me  Patient Name:  Divina Delgadillo    YOB: 1985  Age:   32 year old   [facility] MRN: [id number] Telephone Information:     Home Phone [phone]   Mobile [phone]       Address:    [address] [place] [address] Email address:  No e-mail address on record      Emergency Contact(s)  Name Relationship Lgl Grd Work Phone Home Phone Mobile Phone   1. EDOUARD CLAY Grandparent No none [phone] none   2. GIGI DE PAZ Mother No none [phone] none   3. JUAN R DOAN Parent No NONE NONE [phone]           Primary language:  English     needed? No   [facility] [employer]:  [phone] op. 1  Other communication barriers: No  Preferred Method of Communication:  Phone  Current living arrangement: I live in a private home (Foster Home)  Mobility Status/ Medical Equipment: Independent  Other information to know about me:    Health Maintenance  Health Maintenance Reviewed: Due/Overdue (eye, depression AP)    My Access Plan  Medical Emergency 911   Primary Clinic Line [facility]- [phone]   24 Hour Appointment Line [phone] or  [phone]) (toll-free)   24 Hour Nurse Line [phone] (toll-free)   Preferred Urgent Care [facility], [phone]   Preferred Hospital [place], [place]  [phone]   Preferred Pharmacy MAIL ORDER - NO PHONE     Behavioral Health Crisis Line The National Suicide Prevention Lifeline at 1-530.760.1614 or 911     My Care Team Members  Patient Care Team       Relationship Specialty Notifications Start End    Jaleesa Velasquez APRN CNP PCP - General Nurse Practitioner - Gerontology  2/22/17     Phone: [phone] Fax: [phone] [address] [address]    Laci Kruse Therapist   3/28/17     Comment:  [facility]    Phone: [phone]         Patrica Vargas Psychiatrist   3/28/17      Comment:  Basim - not sure of location or # (? with [facility])    Lorin Knight Other (see comments)   3/28/17     Comment:  Foster Home Caregiver Consent to communicate in chart    Phone: [phone]         Ilene CARLIN [facility]    6/15/17     Phone: [phone]         Jose De Jesus Bueno, RN Nurse Coordinator Nurse Admissions 6/16/17     Phone: [phone]         Sue Carter, RN Registered Nurse  All results, Admissions 7/27/17     Lucia Caicedo [facility] worker   10/9/17          My Care Plans  Self Management and Treatment Plan  Goals and (Comments)  Goal #1: I will call RN CC with questions or when needing support      90% of goal reached    Goal #2: I will follow pre CT instuctions as given      100% of goal reached    Goal #3: I will not snack on high carb foods      60% of goal reached    Goal #4: I will get my A1C back into the 6's by my next PCP f/u     10% of goal reached     Action Plans on File: Asthma  Advance Care Plans/Directives Type:        My Medical and Care Information  Problem List   Patient Active Problem List   Diagnosis     Esophageal reflux     non alcoholic fatty liver disease     HTN (hypertension)     HYPERLIPIDEMIA LDL GOAL <100     DVT (deep venous thrombosis) (H)     CKD (chronic kidney disease) stage 3, GFR 30-59 ml/min     Malignant neoplasm of pancreas, unspecified location of malignancy (H)     Type 2 diabetes mellitus with stage 3 chronic kidney disease, without long-term current use of insulin (H)     Bipolar disorder (H)     Cervical high risk HPV (human papillomavirus) test positive     IUD (intrauterine device) in place      Current Medications and Allergies:  See printed Medication Report.    Care Coordination Start Date: 03/03/17   Frequency of Care Coordination: every 1-4 weeks   Form Last Updated: 10/09/2017

## 2017-10-18 DIAGNOSIS — Z79.899 MEDICATION MANAGEMENT: ICD-10-CM

## 2017-10-18 LAB
BASOPHILS # BLD AUTO: 0.1 10E9/L (ref 0–0.2)
BASOPHILS NFR BLD AUTO: 0.6 %
DIFFERENTIAL METHOD BLD: ABNORMAL
EOSINOPHIL # BLD AUTO: 0.3 10E9/L (ref 0–0.7)
EOSINOPHIL NFR BLD AUTO: 2 %
ERYTHROCYTE [DISTWIDTH] IN BLOOD BY AUTOMATED COUNT: 19.5 % (ref 10–15)
HCT VFR BLD AUTO: 37 % (ref 35–47)
HGB BLD-MCNC: 11.7 G/DL (ref 11.7–15.7)
LYMPHOCYTES # BLD AUTO: 5.8 10E9/L (ref 0.8–5.3)
LYMPHOCYTES NFR BLD AUTO: 33.4 %
MCH RBC QN AUTO: 28.3 PG (ref 26.5–33)
MCHC RBC AUTO-ENTMCNC: 31.6 G/DL (ref 31.5–36.5)
MCV RBC AUTO: 90 FL (ref 78–100)
MONOCYTES # BLD AUTO: 1.4 10E9/L (ref 0–1.3)
MONOCYTES NFR BLD AUTO: 8.3 %
NEUTROPHILS # BLD AUTO: 9.6 10E9/L (ref 1.6–8.3)
NEUTROPHILS NFR BLD AUTO: 55.7 %
PLATELET # BLD AUTO: 401 10E9/L (ref 150–450)
RBC # BLD AUTO: 4.13 10E12/L (ref 3.8–5.2)
WBC # BLD AUTO: 17.3 10E9/L (ref 4–11)

## 2017-10-18 PROCEDURE — 36415 COLL VENOUS BLD VENIPUNCTURE: CPT | Performed by: CLINICAL NURSE SPECIALIST

## 2017-10-18 PROCEDURE — 85025 COMPLETE CBC W/AUTO DIFF WBC: CPT | Performed by: CLINICAL NURSE SPECIALIST

## 2017-10-24 ENCOUNTER — CARE COORDINATION (OUTPATIENT)
Dept: CARE COORDINATION | Facility: CLINIC | Age: 32
End: 2017-10-24

## 2017-10-24 NOTE — PROGRESS NOTES
"Clinic Care Coordination Contact  Care Team Conversations  D/I: This RN CC received VM from patient's foster mother, Lorin Knight, yesterday afternoon. She stated she had a question about something. Returned call today. Lorin now states she figured out what she was calling about. Declined to expound on reason for call as she figured it out. We spoke about Divina. She reports she continues to gain weight as she is sneaking food (snacks). Lorin continues to provide the \"right healthy foods\" she needs but Divina likes snacky foods. Overall, patient's health is stable. Lorin haed nothing new to report today.  P: RN CC will f/u as planned.    Jose De Jesus COTA,RN- BC  Clinic Care Coordinator  [facility]  Phone: [phone]          "

## 2017-10-30 ENCOUNTER — TELEPHONE (OUTPATIENT)
Dept: FAMILY MEDICINE | Facility: CLINIC | Age: 32
End: 2017-10-30

## 2017-10-30 DIAGNOSIS — E11.22 TYPE 2 DIABETES MELLITUS WITH STAGE 3 CHRONIC KIDNEY DISEASE, WITHOUT LONG-TERM CURRENT USE OF INSULIN (H): ICD-10-CM

## 2017-10-30 DIAGNOSIS — K21.9 GASTROESOPHAGEAL REFLUX DISEASE WITHOUT ESOPHAGITIS: ICD-10-CM

## 2017-10-30 DIAGNOSIS — N18.30 TYPE 2 DIABETES MELLITUS WITH STAGE 3 CHRONIC KIDNEY DISEASE, WITHOUT LONG-TERM CURRENT USE OF INSULIN (H): ICD-10-CM

## 2017-10-30 NOTE — TELEPHONE ENCOUNTER
Patient is taking 20mg omeprazole daily.  Current RX is for 20mg BID.    Med and pharm ready.    Routing to provider.  Mere KUNZ RN

## 2017-11-21 DIAGNOSIS — Z79.899 MEDICATION MANAGEMENT: ICD-10-CM

## 2017-11-21 PROCEDURE — 36415 COLL VENOUS BLD VENIPUNCTURE: CPT | Performed by: CLINICAL NURSE SPECIALIST

## 2017-11-21 PROCEDURE — 85025 COMPLETE CBC W/AUTO DIFF WBC: CPT | Performed by: CLINICAL NURSE SPECIALIST

## 2017-11-22 ENCOUNTER — CARE COORDINATION (OUTPATIENT)
Dept: CARE COORDINATION | Facility: CLINIC | Age: 32
End: 2017-11-22

## 2017-11-22 LAB
BASOPHILS # BLD AUTO: 0.1 10E9/L (ref 0–0.2)
BASOPHILS NFR BLD AUTO: 0.6 %
DIFFERENTIAL METHOD BLD: ABNORMAL
EOSINOPHIL # BLD AUTO: 0.2 10E9/L (ref 0–0.7)
EOSINOPHIL NFR BLD AUTO: 1.2 %
ERYTHROCYTE [DISTWIDTH] IN BLOOD BY AUTOMATED COUNT: 17.3 % (ref 10–15)
HCT VFR BLD AUTO: 40.9 % (ref 35–47)
HGB BLD-MCNC: 12.9 G/DL (ref 11.7–15.7)
HOWELL-JOLLY BOD BLD QL SMEAR: PRESENT
IMM GRANULOCYTES # BLD: 0.2 10E9/L (ref 0–0.4)
IMM GRANULOCYTES NFR BLD: 0.9 %
LYMPHOCYTES # BLD AUTO: 5.7 10E9/L (ref 0.8–5.3)
LYMPHOCYTES NFR BLD AUTO: 31.2 %
MCH RBC QN AUTO: 29 PG (ref 26.5–33)
MCHC RBC AUTO-ENTMCNC: 31.5 G/DL (ref 31.5–36.5)
MCV RBC AUTO: 92 FL (ref 78–100)
MONOCYTES # BLD AUTO: 1.4 10E9/L (ref 0–1.3)
MONOCYTES NFR BLD AUTO: 7.7 %
NEUTROPHILS # BLD AUTO: 10.6 10E9/L (ref 1.6–8.3)
NEUTROPHILS NFR BLD AUTO: 58.4 %
PLATELET # BLD AUTO: 410 10E9/L (ref 150–450)
RBC # BLD AUTO: 4.45 10E12/L (ref 3.8–5.2)
WBC # BLD AUTO: 18.2 10E9/L (ref 4–11)

## 2017-11-30 ENCOUNTER — CARE COORDINATION (OUTPATIENT)
Dept: CARE COORDINATION | Facility: CLINIC | Age: 32
End: 2017-11-30

## 2017-11-30 NOTE — PROGRESS NOTES
Clinic Care Coordination Contact  Care Team Conversations  D/I: This RN CC received a call from patient's foster mother, Lorin (consent to communicate on file). She was wondering about psychiatric services her at [facility]. Reports that she has been taking Divina to a psychiatrist in [place]. Provider has moved her practice to [place] and that is too far. I asked if they had been given a referral to someone else. Lorin states they were referred to [facility] and they have an appointment. However, at Divina's team meeting yesterday, it was mentioned that there is a psychiatric provider here at [place] but they weren't sure if provider was available for long term psychiatric care. Discussed this provider's role (Lucy Fonseca) and that she does not see patient's long term. Asked if they were satisfied with [facility] as a provider and explained that I could get a referral from PCP for psychiatric care within [facility] or through one of [facility]'s partnering agencies. Advised that this may be [facility] or may be a referrtal to the , which Lorin has stated she doesn't want due to distance and difficulty getting there. She is happy with going to [facility] and has an appointment set up. She just wanted to follow up on what was discussed at patient's care team meeting yesterday. We discussed Divina's status. Lorin reports they aare coming in to see PCP next week. She akso reports that Divina has gained a lot of weight, when she was supposed to loose. She continues to eat snacks and other bad foods. She has a new [facility] worker who is on board with dietary restrictions. They have a nutrition appointment set up and the [facility] worker will come with. Lorin believes this will be helpful because then it will be more than just her enforcing dietary issues.  P: RN CC will f/u in 3-4 weeks.    Jose De Jesus COTA,RN- BC  Clinic Care Coordinator  [facility]  Phone: [phone]

## 2017-12-07 ENCOUNTER — OFFICE VISIT (OUTPATIENT)
Dept: FAMILY MEDICINE | Facility: CLINIC | Age: 32
End: 2017-12-07
Payer: MEDICARE

## 2017-12-07 VITALS
TEMPERATURE: 98.6 F | HEART RATE: 88 BPM | WEIGHT: 198.5 LBS | BODY MASS INDEX: 33.07 KG/M2 | DIASTOLIC BLOOD PRESSURE: 70 MMHG | HEIGHT: 65 IN | SYSTOLIC BLOOD PRESSURE: 138 MMHG

## 2017-12-07 DIAGNOSIS — N18.30 TYPE 2 DIABETES MELLITUS WITH STAGE 3 CHRONIC KIDNEY DISEASE, WITHOUT LONG-TERM CURRENT USE OF INSULIN (H): Primary | ICD-10-CM

## 2017-12-07 DIAGNOSIS — E11.22 TYPE 2 DIABETES MELLITUS WITH STAGE 3 CHRONIC KIDNEY DISEASE, WITHOUT LONG-TERM CURRENT USE OF INSULIN (H): Primary | ICD-10-CM

## 2017-12-07 DIAGNOSIS — E66.01 MORBID OBESITY (H): ICD-10-CM

## 2017-12-07 LAB
ANION GAP SERPL CALCULATED.3IONS-SCNC: 6 MMOL/L (ref 3–14)
BUN SERPL-MCNC: 29 MG/DL (ref 7–30)
CALCIUM SERPL-MCNC: 9.9 MG/DL (ref 8.5–10.1)
CHLORIDE SERPL-SCNC: 103 MMOL/L (ref 94–109)
CO2 SERPL-SCNC: 28 MMOL/L (ref 20–32)
CREAT SERPL-MCNC: 1.34 MG/DL (ref 0.52–1.04)
GFR SERPL CREATININE-BSD FRML MDRD: 46 ML/MIN/1.7M2
GLUCOSE SERPL-MCNC: 248 MG/DL (ref 70–99)
HBA1C MFR BLD: 9.6 % (ref 4.3–6)
POTASSIUM SERPL-SCNC: 4.5 MMOL/L (ref 3.4–5.3)
SODIUM SERPL-SCNC: 137 MMOL/L (ref 133–144)

## 2017-12-07 PROCEDURE — 99214 OFFICE O/P EST MOD 30 MIN: CPT | Performed by: NURSE PRACTITIONER

## 2017-12-07 PROCEDURE — 80048 BASIC METABOLIC PNL TOTAL CA: CPT | Performed by: NURSE PRACTITIONER

## 2017-12-07 PROCEDURE — 83036 HEMOGLOBIN GLYCOSYLATED A1C: CPT | Performed by: NURSE PRACTITIONER

## 2017-12-07 PROCEDURE — 36415 COLL VENOUS BLD VENIPUNCTURE: CPT | Performed by: NURSE PRACTITIONER

## 2017-12-07 ASSESSMENT — ANXIETY QUESTIONNAIRES
1. FEELING NERVOUS, ANXIOUS, OR ON EDGE: SEVERAL DAYS
3. WORRYING TOO MUCH ABOUT DIFFERENT THINGS: SEVERAL DAYS
6. BECOMING EASILY ANNOYED OR IRRITABLE: MORE THAN HALF THE DAYS
4. TROUBLE RELAXING: NOT AT ALL
2. NOT BEING ABLE TO STOP OR CONTROL WORRYING: NOT AT ALL
GAD7 TOTAL SCORE: 7
5. BEING SO RESTLESS THAT IT IS HARD TO SIT STILL: NOT AT ALL
7. FEELING AFRAID AS IF SOMETHING AWFUL MIGHT HAPPEN: NEARLY EVERY DAY
IF YOU CHECKED OFF ANY PROBLEMS ON THIS QUESTIONNAIRE, HOW DIFFICULT HAVE THESE PROBLEMS MADE IT FOR YOU TO DO YOUR WORK, TAKE CARE OF THINGS AT HOME, OR GET ALONG WITH OTHER PEOPLE: VERY DIFFICULT

## 2017-12-07 ASSESSMENT — PATIENT HEALTH QUESTIONNAIRE - PHQ9: SUM OF ALL RESPONSES TO PHQ QUESTIONS 1-9: 12

## 2017-12-07 NOTE — NURSING NOTE
"Chief Complaint   Patient presents with     Diabetes       Initial /73  Pulse 99  Temp 98.6  F (37  C) (Tympanic)  Ht 5' 4.5\" (1.638 m)  Wt 198 lb 8 oz (90 kg)  BMI 33.55 kg/m2 Estimated body mass index is 33.55 kg/(m^2) as calculated from the following:    Height as of this encounter: 5' 4.5\" (1.638 m).    Weight as of this encounter: 198 lb 8 oz (90 kg).  Medication Reconciliation: complete  "

## 2017-12-07 NOTE — MR AVS SNAPSHOT
After Visit Summary   12/7/2017    Divina Delgadillo    MRN: [id number]           Patient Information     Date Of Birth          1985        Visit Information        Provider Department      12/7/2017 9:00 AM Jaleesa Velasquez APRN CNP [facility]        Today's Diagnoses     Type 2 diabetes mellitus with stage 3 chronic kidney disease, without long-term current use of insulin (H)    -  1    Morbid obesity (H)          Care Instructions    Start Trulicity injection every 7 days, if not covered will need to change to daily injectable non-insuline medicine, Victoza.     NEED TO LOOSE WEIGHT!    Add more fruits and veggies to your diet.      Labs: A1C, BMP  Follow up in 2 weeks                        Follow-ups after your visit        Your next 10 appointments already scheduled     Dec 14, 2017  2:40 PM CST   Return Visit with Jeremiah Perdomo MD   [facility])    [address] [place] [address] [id number] [phone]            Jan 09, 2018  9:30 AM CST   LAB with LAB FIRST FLOOR [facility] ([facility])    [address] [place] [address] [id number] [phone]           Please do not eat 10-12 hours before your appointment if you are coming in fasting for labs on lipids, cholesterol, or glucose (sugar). This does not apply to pregnant women. Water, hot tea and black coffee (with nothing added) are okay. Do not drink other fluids, diet soda or chew gum.            Jan 09, 2018 10:00 AM CST   Return Visit with Sánchez Dias MD   [facility] ([facility])    [address] [place] [address] [id number] [phone]              Who to contact     If you have questions or need follow up information about today's clinic visit or your schedule please contact [facility] directly at [phone].  Normal or non-critical lab and  "imaging results will be communicated to you by MyChart, letter or phone within 4 business days after the clinic has received the results. If you do not hear from us within 7 days, please contact the clinic through [employer] or phone. If you have a critical or abnormal lab result, we will notify you by phone as soon as possible.  Submit refill requests through [employer] or call your pharmacy and they will forward the refill request to us. Please allow 3 business days for your refill to be completed.          Additional Information About Your Visit        [employer] Information     [employer] lets you send messages to your doctor, view your test results, renew your prescriptions, schedule appointments and more. To sign up, go to [email or web address] . Click on \"Log in\" on the left side of the screen, which will take you to the Welcome page. Then click on \"Sign up Now\" on the right side of the page.     You will be asked to enter the access code listed below, as well as some personal information. Please follow the directions to create your username and password.     Your access code is: [email or web address]  Expires: 3/7/2018  9:45 AM     Your access code will  in 90 days. If you need help or a new code, please call your [facility] clinic or [phone].        Care EveryWhere ID     This is your Care EveryWhere ID. This could be used by other organizations to access your [facility] medical records  [id number]        Your Vitals Were     Pulse Temperature Height BMI (Body Mass Index)          99 98.6  F (37  C) (Tympanic) 5' 4.5\" (1.638 m) 33.55 kg/m2         Blood Pressure from Last 3 Encounters:   17 144/73   17 135/65   17 131/67    Weight from Last 3 Encounters:   17 198 lb 8 oz (90 kg)   17 192 lb 9.6 oz (87.4 kg)   17 192 lb 9.6 oz (87.4 kg)              We Performed the Following     Basic metabolic panel     Hemoglobin A1c          Today's Medication Changes          These changes are " accurate as of: 12/7/17  9:45 AM.  If you have any questions, ask your nurse or doctor.               Start taking these medicines.        Dose/Directions    dulaglutide 0.75 MG/0.5ML pen   Commonly known as:  TRULICITY   Used for:  Morbid obesity (H), Type 2 diabetes mellitus with stage 3 chronic kidney disease, without long-term current use of insulin (H)   Started by:  Jaleesa Velasquez APRN CNP        Dose:  0.75 mg   Inject 0.75 mg Subcutaneous every 7 days   Quantity:  2 mL   Refills:  2            Where to get your medicines      These medications were sent to [place] [facility] - [place], [place] - [address]  [address], [place] [address]    Hours:  [facility] Phone:  [phone]     dulaglutide 0.75 MG/0.5ML pen                Primary Care Provider Office Phone # Fax #    VERONIQUE Bledsoe [phone] [address] [facility] [address]        Equal Access to Services     [facility] CenterKENNEDY : Hadii juan manuel hugoo Sotrace, waaxda luqadaha, qaybta kaalmada adeegyabj, aaron echeverria . So [facility] [phone].    ATENCIÓN: Si habla español, tiene a gil disposición servicios gratuitos de asistencia lingüística. Llame al [phone].    We comply with applicable federal civil rights laws and [place] laws. We do not discriminate on the basis of race, color, national origin, age, disability, sex, sexual orientation, or gender identity.            Thank you!     Thank you for choosing [facility]  for your care. Our goal is always to provide you with excellent care. Hearing back from our patients is one way we can continue to improve our services. Please take a few minutes to complete the written survey that you may receive in the mail after your visit with us. Thank you!             Your Updated Medication List - Protect others around you: Learn how to safely use, store and throw away your medicines  at www.disposemymeds.org.          This list is accurate as of: 12/7/17  9:45 AM.  Always use your most recent med list.                   Brand Name Dispense Instructions for use Diagnosis    ABILIFY 30 MG tablet   Generic drug:  ARIPiprazole      Take 30 mg by mouth daily        amLODIPine 5 MG tablet    NORVASC    90 tablet    Take 1 tablet (5 mg) by mouth daily    Essential hypertension       blood glucose lancets standard    no brand specified    1    needs lancet     Type II or unspecified type diabetes mellitus without mention of complication, not stated as uncontrolled, Mild persistent asthma       blood glucose monitoring test strip    no brand specified    100 each    1 strip by In Vitro route daily Use to test blood sugar 1 times daily  And as needed ACCU Check Haylee Plus Test Strips    Type 2 diabetes mellitus with stage 3 chronic kidney disease, without long-term current use of insulin (H)       chlorthalidone 25 MG tablet    HYGROTON    45 tablet    Take 0.5 tablets (12.5 mg) by mouth daily    Essential hypertension       Clozapine 200 MG tablet    CLOZARIL     Take 200 mg by mouth 2 times daily        dulaglutide 0.75 MG/0.5ML pen    TRULICITY    2 mL    Inject 0.75 mg Subcutaneous every 7 days    Morbid obesity (H), Type 2 diabetes mellitus with stage 3 chronic kidney disease, without long-term current use of insulin (H)       ferrous sulfate 325 (65 FE) MG tablet    IRON    30 tablet    Take 1 tablet (325 mg) by mouth daily (with breakfast)    Other iron deficiency anemia       FLUoxetine 20 MG capsule    PROzac     Take 20 mg by mouth daily        glipiZIDE 10 MG tablet    GLUCOTROL    60 tablet    Take 1 tablet (10 mg) by mouth 2 times daily (before meals)    Type 2 diabetes mellitus with stage 3 chronic kidney disease, without long-term current use of insulin (H)       MELATONIN PO      Take 3 mg by mouth nightly as needed        montelukast 10 MG tablet    SINGULAIR    90 tablet     Take 1 tablet (10 mg) by mouth At Bedtime        naltrexone 50 MG tablet    DEPADE;REVIA     Take 50 mg by mouth every morning        omeprazole 20 MG CR capsule    priLOSEC    30 capsule    1 capsule bid    Gastroesophageal reflux disease without esophagitis       ranitidine 300 MG tablet    ZANTAC    90 tablet    Take 1 tablet (300 mg) by mouth At Bedtime    Gastroesophageal reflux disease without esophagitis

## 2017-12-07 NOTE — PROGRESS NOTES
SUBJECTIVE:   Divina Delgadillo is a 32 year old female who presents to clinic today for the following health issues:  Divina reports that her blood sugars continue to stay elevated, over 200' fasting, she is gaining weight, because she does not exercise and eat a lot of unhealthy meals, she eats pasta almost daily, bread and a lot of other carbs. She states she does not eat fruits and veggies because she does not have lower teeth, but she is following her dentist and hoping to get dentures in the near future.   I advised Divina that she can bake her favorite fruits and veggies like carrots and apples to make them soften and enjoy healthy meals vs bread and pasta. She also states she eats a lot because she is bored. She feels more depressed because she gained more weight.     Diabetes Follow-up    Patient is checking blood sugars: once daily.  Results are as follows:       Am before breakfast - 200 or over every day for this month, last month they were up and down         Diabetic concerns: blood sugar frequently over 200     Symptoms of hypoglycemia (low blood sugar): none     Paresthesias (numbness or burning in feet) or sores: Yes numbness     Date of last diabetic eye exam: 11 months ago       BP Readings from Last 2 Encounters:   12/07/17 138/70   09/07/17 135/65     Hemoglobin A1C (%)   Date Value   12/07/2017 9.6 (H)   09/07/2017 7.7 (H)     LDL Cholesterol Calculated (mg/dL)   Date Value   06/15/2017 192 (H)   03/12/2009 80         Amount of exercise or physical activity: None    Problems taking medications regularly: No    Medication side effects: none    Diet: regular (no restrictions)    Problem list and histories reviewed & adjusted, as indicated.  Additional history: as documented    Labs reviewed in EPIC    Reviewed and updated as needed this visit by clinical staffTobacco  Allergies  Med Hx  Surg Hx  Fam Hx  Soc Hx      Reviewed and updated as needed this visit by Provider      "    ROS:  Constitutional, HEENT, cardiovascular, pulmonary, gi and gu systems are negative, except as otherwise noted.      OBJECTIVE:   /70  Pulse 88  Temp 98.6  F (37  C) (Tympanic)  Ht 5' 4.5\" (1.638 m)  Wt 198 lb 8 oz (90 kg)  BMI 33.55 kg/m2  Body mass index is 33.55 kg/(m^2).  GENERAL: healthy, alert and no distress  RESP: lungs clear to auscultation - no rales, rhonchi or wheezes  CV: regular rates and rhythm, normal S1 S2, no S3 or S4, no murmur, click or rub, peripheral pulses strong and slight lower extremity pitting edema.   SKIN: no suspicious lesions or rashes  PSYCH: mentation appears normal, affect normal/bright  Diabetic foot exam: normal DP and PT pulses, no trophic changes or ulcerative lesions and normal sensory exam    Diagnostic Test Results:  A1C 9.6%  Last Basic Metabolic Panel:  Lab Results   Component Value Date     12/07/2017      Lab Results   Component Value Date    POTASSIUM 4.5 12/07/2017     Lab Results   Component Value Date    CHLORIDE 103 12/07/2017     Lab Results   Component Value Date    CLAY 9.9 12/07/2017     Lab Results   Component Value Date    CO2 28 12/07/2017     Lab Results   Component Value Date    BUN 29 12/07/2017     Lab Results   Component Value Date    CR 1.34 12/07/2017     Lab Results   Component Value Date     12/07/2017       ASSESSMENT/PLAN:     1. Type 2 diabetes mellitus with stage 3 chronic kidney disease, without long-term current use of insulin (H)  - Basic metabolic panel  - Hemoglobin A1c  - dulaglutide (TRULICITY) 0.75 MG/0.5ML pen; Inject 0.75 mg Subcutaneous every 7 days  Dispense: 2 mL; Refill: 2  - insulin glargine (LANTUS SOLOSTAR) 100 UNIT/ML injection; Inject 7 units daily morning, increase by 2 units every 4 days until fasting BG less then 140 consistently  Dispense: 3 mL; Refill: 1  -continue Glipizide 10 mg twice daily   -follow up in 2-3 weeks     2. Morbid obesity (H)  - dulaglutide (TRULICITY) 0.75 MG/0.5ML pen; " Inject 0.75 mg Subcutaneous every 7 days  Dispense: 2 mL; Refill: 2  -discussed healthy diet choices   -she will try to exercise more and loose weight     See Patient Instructions    VERONIQUE Spears [facility]

## 2017-12-07 NOTE — PATIENT INSTRUCTIONS
Start Trulicity injection every 7 days, if not covered will need to change to daily injectable non-insuline medicine, Victoza.     NEED TO LOOSE WEIGHT!    Add more fruits and veggies to your diet.      Labs: A1C, BMP  Follow up in 2 weeks

## 2017-12-08 ASSESSMENT — ANXIETY QUESTIONNAIRES: GAD7 TOTAL SCORE: 7

## 2017-12-12 ENCOUNTER — CARE COORDINATION (OUTPATIENT)
Dept: CARE COORDINATION | Facility: CLINIC | Age: 32
End: 2017-12-12

## 2017-12-12 NOTE — PROGRESS NOTES
Clinic Care Coordination Contact  Care Team Conversations  D/I: RN Cc received phone call from Lorin Knight,, patient's foster mother (C To C in chart). Lorin is tying to set up nutrition appointment for patient. They saw PCP last week. Patient's A1C had gone up to 9.6 (had been in 7's and she was supposed to get it down). Per Lorin and PCP note, patient was started on a new med for diabetes, Trulicity. Is also supposed to f/u with nutrition. Lorin can't remember who they saw and is wondering who to call. RN CC able to see visit from 8/24. Provided clinic number to schedule. Lorin will do this. No other questions/concerns today.  P: RN CC will f/u as planned to check in on nutrition recs and provide reinforcement as needed.    Jose De Jesus COTA,RN- BC  Clinic Care Coordinator  [facility]  Phone: [phone]

## 2017-12-14 ENCOUNTER — OFFICE VISIT (OUTPATIENT)
Dept: GASTROENTEROLOGY | Facility: CLINIC | Age: 32
End: 2017-12-14
Payer: MEDICARE

## 2017-12-14 VITALS
SYSTOLIC BLOOD PRESSURE: 144 MMHG | HEART RATE: 102 BPM | WEIGHT: 199.9 LBS | BODY MASS INDEX: 33.3 KG/M2 | HEIGHT: 65 IN | DIASTOLIC BLOOD PRESSURE: 71 MMHG

## 2017-12-14 DIAGNOSIS — K75.81 NASH (NONALCOHOLIC STEATOHEPATITIS): Primary | ICD-10-CM

## 2017-12-14 DIAGNOSIS — E66.9 OBESITY: ICD-10-CM

## 2017-12-14 DIAGNOSIS — E11.9 TYPE 2 DIABETES MELLITUS (H): ICD-10-CM

## 2017-12-14 PROCEDURE — 99214 OFFICE O/P EST MOD 30 MIN: CPT | Performed by: INTERNAL MEDICINE

## 2017-12-14 ASSESSMENT — PAIN SCALES - GENERAL: PAINLEVEL: NO PAIN (0)

## 2017-12-14 NOTE — PROGRESS NOTES
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     Her metabolic state appears to have worsened since we last visit.   Hg A1c is up to > 9 %, and she's gained ~ 12 lbs  Although her liver enzymes have not been checked recently, I expect them to be the same as before if not worse based on worsening metabolic parameters.   Spent the entire visit identifying goals of care including improved life-style choices, and exploring strategies to help implement change.   I believe should benefit greatly from wellness/health coaching, will explore availability of this option.   Encouraged her to stay active and exercise on regular basis.   Otherwise, no change to the plan.     RTC in 6 months with labs.     Jeremiah Perdomo MD  [facility]    -----------------------------------------------------       HPI:   Divina Delgadillo is a 32 year old female with presumed fatty liver disease, who presents for follow up.     Please refer to prior clinic note for details of initial presentation.     Today, she doesn't feel good, describes sadness, depression, anger at herself, fear for her health. She's disappointed insulin was started, and feels it's a sign of failure, since this is the heaviest she's been.   Acknowledges inability to exercise regularly, or improve her dietary habits since we last met.   She maintains sobriety for at least the past 8 years.     Otherwise, denies chest pain,  shortness of breath, abdominal pain, nausea, vomiting fevers, chills, bleeding, jaundice, confusion, falls, rash, pruritis, leg swelling. Has stable appetite and bowel habits.         Past Medical History:     Past Medical History:   Diagnosis Date     Cervical high risk HPV (human papillomavirus) test positive 6/20/2017     Depressive disorder, not elsewhere classified      DVT (deep venous thrombosis) (H)      Dysmetabolic syndrome X      Esophageal reflux     GERD     Mild intermittent asthma      Other abnormal heart sounds     Heart murmur     Other specified types of schizophrenia, unspecified condition      Pancreatic cancer (H)     left adrenal gland, partial pancreas, and spleen removed; no chemo or radiation.      Type II or unspecified type diabetes mellitus without mention of complication, not stated as uncontrolled      Unspecified disorder of tympanic membrane             Past Surgical History:     Past Surgical History:   Procedure Laterality Date     ADRENALECTOMY       PANCREATECTOMY PARTIAL       SPLENECTOMY       SURGICAL HISTORY OF -   10/1/2000    Tympanoplasty     SURGICAL HISTORY OF -   10/1/2000    Tonsillectomy            Medications:     Current Outpatient Prescriptions   Medication     dulaglutide (TRULICITY) 0.75 MG/0.5ML pen     insulin glargine (LANTUS SOLOSTAR) 100 UNIT/ML injection     blood glucose monitoring (NO BRAND SPECIFIED) test strip     omeprazole (PRILOSEC) 20 MG CR capsule     amLODIPine (NORVASC) 5 MG tablet     chlorthalidone (HYGROTON) 25 MG tablet     ranitidine (ZANTAC) 300 MG tablet     glipiZIDE (GLUCOTROL) 10 MG tablet     montelukast (SINGULAIR) 10 MG tablet     ferrous sulfate (IRON) 325 (65 FE) MG tablet     MELATONIN PO     Clozapine (CLOZARIL) 200 MG tablet     ARIPiprazole (ABILIFY) 30 MG tablet     FLUoxetine (PROZAC) 20 MG capsule     naltrexone (DEPADE;REVIA) 50 MG tablet     LANCETS MISC. KIT     No current facility-administered medications for this visit.  "            Allergies:     Allergies   Allergen Reactions     Latex      Tylenol [Tylenol]             Social History:     Social History     Social History     Marital status: Single     Spouse name: N/A     Number of children: 0     Years of education: N/A     Occupational History     Not on file.     Social History Main Topics     Smoking status: Current Every Day Smoker     Packs/day: 1.00     Years: 4.00     Types: Cigarettes     Smokeless tobacco: Never Used      Comment: 1 packe every other day      Alcohol use No     Drug use: No     Sexual activity: Not Currently     Partners: Female     Other Topics Concern     Not on file     Social History Narrative            Family History:     Family History   Problem Relation Age of Onset     Respiratory Mother      ASTHMA     Allergies Mother      Depression Mother      HEART DISEASE Father      HEART VALVE REPLACEMENT     Hypertension Father      DIABETES Father      Depression Father      Respiratory Brother      Allergies Brother      Respiratory Sister      Allergies Sister      Depression Sister      Respiratory Sister      Allergies Sister      Depression Sister      KIDNEY DISEASE No family hx of               Review of Systems:   10 points ROS was obtained and highlighted in the HPI, otherwise negative.          Physical Exam:   VS:  /71 (BP Location: Left arm, Patient Position: Chair, Cuff Size: Adult Large)  Pulse 102  Ht 1.638 m (5' 4.5\")  Wt 90.7 kg (199 lb 14.4 oz)  BMI 33.78 kg/m2      Gen: A&Ox3, NAD  HEENT: non-icteric, oropharynx clear  CV: RRR  Lung: CTAB  Abd: soft, NT, ND, obese,  liver is not palpable.   Ext: no edema, intact pulses.   Skin: No stigmata of chronic liver disease.   Neuro: no focal deficits, grossly intact, no asterixis   Psych: flat and depressed mood and affects         Data:   Reviewed in person and significant for:  Lab Results   Component Value Date    WBC 18.2 11/21/2017     Lab Results   Component Value Date    " RBC 4.45 11/21/2017     Lab Results   Component Value Date    HGB 12.9 11/21/2017     Lab Results   Component Value Date    HCT 40.9 11/21/2017     No components found for: MCT  Lab Results   Component Value Date    MCV 92 11/21/2017     Lab Results   Component Value Date    MCH 29.0 11/21/2017     Lab Results   Component Value Date    MCHC 31.5 11/21/2017     Lab Results   Component Value Date    RDW 17.3 11/21/2017     Lab Results   Component Value Date     11/21/2017     Last Basic Metabolic Panel:  Lab Results   Component Value Date     12/07/2017      Lab Results   Component Value Date    POTASSIUM 4.5 12/07/2017     Lab Results   Component Value Date    CHLORIDE 103 12/07/2017     Lab Results   Component Value Date    CLAY 9.9 12/07/2017     Lab Results   Component Value Date    CO2 28 12/07/2017     Lab Results   Component Value Date    BUN 29 12/07/2017     Lab Results   Component Value Date    CR 1.34 12/07/2017     Lab Results   Component Value Date     12/07/2017     Liver Function Studies -   Recent Labs   Lab Test  09/19/17   1503  08/15/17   1506   PROTTOTAL   --   8.4   ALBUMIN  3.4  3.6   BILITOTAL   --   0.3   ALKPHOS   --   172*   AST   --   36   ALT   --   59*

## 2017-12-14 NOTE — MR AVS SNAPSHOT
After Visit Summary   12/14/2017    Divina Delgadillo    MRN: [id number]           Patient Information     Date Of Birth          1985        Visit Information        Provider Department      12/14/2017 2:40 PM Jeremiah Perdomo MD [facility]         Follow-ups after your visit        Your next 10 appointments already scheduled     Dec 19, 2017  2:30 PM CST   Consult HOD with Kylie Maxwell RD   [facility] ([facility])    [address] [facility] [address] [facility] [address]   [phone]            Jan 09, 2018  9:30 AM CST   LAB with LAB FIRST FLOOR [facility] ([facility])    [address] [facility] [address]   [phone]           Please do not eat 10-12 hours before your appointment if you are coming in fasting for labs on lipids, cholesterol, or glucose (sugar). This does not apply to pregnant women. Water, hot tea and black coffee (with nothing added) are okay. Do not drink other fluids, diet soda or chew gum.            Jan 09, 2018 10:00 AM CST   Return Visit with Sánchez Dias MD   [facility] ([facility])    [address] [facility] [address]   [phone]            Jun 28, 2018 11:20 AM CDT   LAB with LAB FIRST FLOOR [facility] ([facility])    [address] [facility] [address]   [phone]           Please do not eat 10-12 hours before your appointment if you are coming in fasting for labs on lipids, cholesterol, or glucose (sugar). This does not apply to pregnant women. Water, hot tea and black coffee (with nothing added) are okay. Do not drink other fluids, diet soda or chew gum.            Jun 28, 2018 11:40 AM CDT   Return Visit with Jeremiah Perdomo MD   [facility] ([facility])    [address] [facility] [address]  "  [phone]              Who to contact     If you have questions or need follow up information about today's clinic visit or your schedule please contact [facility] directly at [phone].  Normal or non-critical lab and imaging results will be communicated to you by MyChart, letter or phone within 4 business days after the clinic has received the results. If you do not hear from us within 7 days, please contact the clinic through MyChart or phone. If you have a critical or abnormal lab result, we will notify you by phone as soon as possible.  Submit refill requests through [employer] or call your pharmacy and they will forward the refill request to us. Please allow 3 business days for your refill to be completed.          Additional Information About Your Visit        [employer] Information     [employer] is an electronic gateway that provides easy, online access to your medical records. With [employer], you can request a clinic appointment, read your test results, renew a prescription or communicate with your care team.     To sign up for [employer] visit the website at [email or web address]   You will be asked to enter the access code listed below, as well as some personal information. Please follow the directions to create your username and password.     Your access code is: [email or web address]  Expires: 3/7/2018  9:45 AM     Your access code will  in 90 days. If you need help or a new code, please contact your [facility] or call [phone] for assistance.        Care EveryWhere ID     This is your Care EveryWhere ID. This could be used by other organizations to access your [facility] medical records  [id number]        Your Vitals Were     Pulse Height BMI (Body Mass Index)             102 1.638 m (5' 4.5\") 33.78 kg/m2          Blood Pressure from Last 3 Encounters:   17 144/71   17 138/70   17 135/65    Weight from Last 3 Encounters:   17 " 90.7 kg (199 lb 14.4 oz)   12/07/17 90 kg (198 lb 8 oz)   09/07/17 87.4 kg (192 lb 9.6 oz)              Today, you had the following     No orders found for display       Primary Care Provider Office Phone # Fax VERONIQUE Edwards [phone] [address] [facility] [address]        Equal Access to Services     FANG HAY : Hadii aad ku hadasho Soomaali, waaxda luqadaha, qaybta kaalmada adeegyada, waxay idiin hayaan adeeg kharash la'tello . So [facility] [phone].    ATENCIÓN: Si samantha segura, tiene a gil disposición servicios gratuitos de asistencia lingüística. Llame al [phone].    We comply with applicable federal civil rights laws and [place] laws. We do not discriminate on the basis of race, color, national origin, age, disability, sex, sexual orientation, or gender identity.            Thank you!     Thank you for choosing [facility]  for your care. Our goal is always to provide you with excellent care. Hearing back from our patients is one way we can continue to improve our services. Please take a few minutes to complete the written survey that you may receive in the mail after your visit with us. Thank you!             Your Updated Medication List - Protect others around you: Learn how to safely use, store and throw away your medicines at www.disposemymeds.org.          This list is accurate as of: 12/14/17  3:37 PM.  Always use your most recent med list.                   Brand Name Dispense Instructions for use Diagnosis    ABILIFY 30 MG tablet   Generic drug:  ARIPiprazole      Take 30 mg by mouth daily        amLODIPine 5 MG tablet    NORVASC    90 tablet    Take 1 tablet (5 mg) by mouth daily    Essential hypertension       blood glucose lancets standard    no brand specified    1    needs lancet     Type II or unspecified type diabetes mellitus without mention of complication, not stated as uncontrolled, Mild persistent  asthma       blood glucose monitoring test strip    no brand specified    100 each    1 strip by In Vitro route daily Use to test blood sugar 1 times daily  And as needed ACCU Check Haylee Plus Test Strips    Type 2 diabetes mellitus with stage 3 chronic kidney disease, without long-term current use of insulin (H)       chlorthalidone 25 MG tablet    HYGROTON    45 tablet    Take 0.5 tablets (12.5 mg) by mouth daily    Essential hypertension       Clozapine 200 MG tablet    CLOZARIL     Take 200 mg by mouth 2 times daily        dulaglutide 0.75 MG/0.5ML pen    TRULICITY    2 mL    Inject 0.75 mg Subcutaneous every 7 days    Morbid obesity (H), Type 2 diabetes mellitus with stage 3 chronic kidney disease, without long-term current use of insulin (H)       ferrous sulfate 325 (65 FE) MG tablet    IRON    30 tablet    Take 1 tablet (325 mg) by mouth daily (with breakfast)    Other iron deficiency anemia       FLUoxetine 20 MG capsule    PROzac     Take 20 mg by mouth daily        glipiZIDE 10 MG tablet    GLUCOTROL    60 tablet    Take 1 tablet (10 mg) by mouth 2 times daily (before meals)    Type 2 diabetes mellitus with stage 3 chronic kidney disease, without long-term current use of insulin (H)       insulin glargine 100 UNIT/ML injection    LANTUS SOLOSTAR    3 mL    Inject 7 units daily morning, increase by 2 units every 4 days until fasting BG less then 140 consistently    Type 2 diabetes mellitus with stage 3 chronic kidney disease, without long-term current use of insulin (H)       MELATONIN PO      Take 3 mg by mouth nightly as needed        montelukast 10 MG tablet    SINGULAIR    90 tablet    Take 1 tablet (10 mg) by mouth At Bedtime        naltrexone 50 MG tablet    DEPADE;REVIA     Take 50 mg by mouth every morning        omeprazole 20 MG CR capsule    priLOSEC    30 capsule    1 capsule bid    Gastroesophageal reflux disease without esophagitis       ranitidine 300 MG tablet    ZANTAC    90 tablet    Take  1 tablet (300 mg) by mouth At Bedtime    Gastroesophageal reflux disease without esophagitis

## 2017-12-14 NOTE — NURSING NOTE
"Divina Delgadillo's goals for this visit include:   Chief Complaint   Patient presents with     RECHECK     She requests these members of her care team be copied on today's visit information: YES - PCP     Initial /71 (BP Location: Left arm, Patient Position: Chair, Cuff Size: Adult Large)  Pulse 102  Ht 1.638 m (5' 4.5\")  Wt 90.7 kg (199 lb 14.4 oz)  BMI 33.78 kg/m2 Estimated body mass index is 33.78 kg/(m^2) as calculated from the following:    Height as of this encounter: 1.638 m (5' 4.5\").    Weight as of this encounter: 90.7 kg (199 lb 14.4 oz).  BP completed using cuff size: large        "

## 2017-12-19 ENCOUNTER — HOSPITAL ENCOUNTER (OUTPATIENT)
Dept: NUTRITION | Facility: CLINIC | Age: 32
Discharge: HOME | End: 2017-12-19
Attending: NURSE PRACTITIONER | Admitting: NURSE PRACTITIONER
Payer: MEDICARE

## 2017-12-19 PROCEDURE — 97803 MED NUTRITION INDIV SUBSEQ: CPT | Performed by: DIETITIAN, REGISTERED

## 2017-12-21 NOTE — PROGRESS NOTES
[place] [facility]  Medical Nutrition Therapy     Visit Type: Reassessment      Divina K Leona referred by Dr. Perdomo for MNT related to Type 2 Diabetes, SAHNI, CKD.     Patient accompanied by foster mother and [employer] worker.     Nutrition Assessment:  Anthropometrics  Wt Readings from Last 10 Encounters:   12/14/17 90.7 kg (199 lb 14.4 oz)   12/07/17 90 kg (198 lb 8 oz)   09/07/17 87.4 kg (192 lb 9.6 oz)   07/27/17 87.4 kg (192 lb 9.6 oz)   07/26/17 84.8 kg (187 lb)   07/14/17 85 kg (187 lb 6.4 oz)   07/10/17 84.4 kg (186 lb 1.6 oz)   06/15/17 85.5 kg (188 lb 6.4 oz)   06/07/17 85.5 kg (188 lb 8 oz)   05/17/17 84.6 kg (186 lb 8 oz)     Nutrition History  Meal/Snack Patterns: Most of the time eats 3 meals/day, snacks in between, reports skipping breakfast sometimes  Level of Knowledge: No previous knowledge  Beliefs and Attitudes: Readiness to change nutrition-related behaviors  Willingness to Learn: Acceptance    Has made a lot of changes since last visit. Monitoring portion sizes, decreased sugary beverage intake dramatically, exercising 2 days/week, and trying to choose healthier foods. Asking about refresh on the carbohydrate counting. Feeling down about having to start insulin.     Physical Activity  Physical Activity History: Walks or exercise videos  Frequency (times/week): 2 (times/week)  Duration (minutes/day): 20 minutes/day  Intensity: Moderate     Nutrition Prescription  Energy:  1575 - 1890 kcals     Protein:  63 g         Fluid:  1 mL/kcal       Fat:  Limit saturated fats, avoid trans fats         Carbohydrate:  3 - 4 choices (45 - 60 grams)             Micronutrient:  Iron as directed by Dr. Perdomo               Food Record  Breakfast: plain oatmeal with milk  Lunch: lunch meat, yogurt, string cheese, and apple or leftovers  Snack: fruit or SF pudding  Dinner: meat, potatoes, and/or vegetables   Snack: fruit or SF pudding, maybe peanut butter     Foster mother, Lorin, does the meal prep.  Drinking water and 1 glass of milk daily.      Nutrition Diagnosis:  Problem: Food and nutrition-related knowledge deficit  Etiology: Related to no previous food- and nutrition-related education (prior to last visit)  Signs and Symptoms: Reports no previous education/knowledge, needing reinforcement on previous appointment learnings     Nutrition Intervention:  Nutrition Education: Comprehensive, Meals and Snacks: Modify distribution, type or amount of food and nutrients within meals or at specified time  -Went over carbohydrate counting again. Recommended 3 - 4 carbohydrate choices per meal.  -Provided patient with Carbohydrate Counting book. Discussed foods that have carbohydrates, label reading, and the amounts of carbohydrates in different foods.   -Did several practice examples of current meals/snacks patient is eating.  -Recommend that patient refrain from skipping meals. Discussed the importance of consistent carbohydrate intake.  -Encouraged continued activity.   -Encouraged patient to continue to track foods; she's been working on this with [employer] worker.  -Discussed healthier, more complex carbohydrate optins.     Nutrition Goals:  1. Do not skip meals  2. Aim for 3 - 4 carbohydrate choices at meals and 0 - 2 carbohydrate choices at snacks  3. Continue with activity  4. Try tracking food intake     Nutrition Follow Up / Monitoring:  Meal/snack/beverage composition; Biochemical data; Eating patterns; Physical activity      Nutrition Recommendations:  Patient to follow-up with RD in 4 - 8 weeks.  Patient has RD contact information to call/email if needed.     Kylie Maxwell RDN, LD  Clinical Dietitian  [phone]     Start time 1435  End time 153

## 2018-01-03 ENCOUNTER — OFFICE VISIT (OUTPATIENT)
Dept: FAMILY MEDICINE | Facility: CLINIC | Age: 33
End: 2018-01-03
Payer: MEDICARE

## 2018-01-03 VITALS
DIASTOLIC BLOOD PRESSURE: 62 MMHG | WEIGHT: 196.7 LBS | SYSTOLIC BLOOD PRESSURE: 130 MMHG | RESPIRATION RATE: 18 BRPM | TEMPERATURE: 99.7 F | HEART RATE: 104 BPM | BODY MASS INDEX: 33.24 KG/M2

## 2018-01-03 DIAGNOSIS — E11.22 TYPE 2 DIABETES MELLITUS WITH STAGE 3 CHRONIC KIDNEY DISEASE, WITHOUT LONG-TERM CURRENT USE OF INSULIN (H): ICD-10-CM

## 2018-01-03 DIAGNOSIS — N18.30 TYPE 2 DIABETES MELLITUS WITH STAGE 3 CHRONIC KIDNEY DISEASE, WITHOUT LONG-TERM CURRENT USE OF INSULIN (H): ICD-10-CM

## 2018-01-03 DIAGNOSIS — E66.01 MORBID OBESITY (H): ICD-10-CM

## 2018-01-03 PROCEDURE — 99213 OFFICE O/P EST LOW 20 MIN: CPT | Performed by: NURSE PRACTITIONER

## 2018-01-03 NOTE — NURSING NOTE
"Chief Complaint   Patient presents with     Diabetes       Initial /76 (BP Location: Left arm, Patient Position: Chair, Cuff Size: Adult Regular)  Pulse 104  Temp 99.7  F (37.6  C) (Tympanic)  Resp 18  Wt 196 lb 11.2 oz (89.2 kg)  BMI 33.24 kg/m2 Estimated body mass index is 33.24 kg/(m^2) as calculated from the following:    Height as of 12/14/17: 5' 4.5\" (1.638 m).    Weight as of this encounter: 196 lb 11.2 oz (89.2 kg).  Medication Reconciliation: complete    Health Maintenance that is potentially due pending provider review:  NONE    Cookie Yu MA  3:25 PM 1/3/2018  .      "

## 2018-01-03 NOTE — MR AVS SNAPSHOT
After Visit Summary   1/3/2018    Divina Delgadillo    MRN: [id number]           Patient Information     Date Of Birth          1985        Visit Information        Provider Department      1/3/2018 3:20 PM Jaleesa Velasquez APRN [facility]        Today's Diagnoses     Type 2 diabetes mellitus with stage 3 chronic kidney disease, without long-term current use of insulin (H)        Morbid obesity (H)          Care Instructions    Increase Lantus to 19 units daily  Follow up with diabetic educator  Continue Trulicity             Follow-ups after your visit        Additional Services     DIABETES EDUCATOR REFERRAL       DIABETES SELF MANAGEMENT TRAINING (DSMT)      Your provider has referred you to Diabetes Education: FMG: Diabetes Education - All [facility] [place] [phone]   [email or web address]     If an urgent visit is needed or A1C is above 12, Care Team to call the Diabetes  Education Team at [phone] or send an In Basket message to the Diabetes Education Pool (P DIAB ED-PATIENT CARE).    A  will call you to make your appointment. If it has been more than 3 business days since your referral was placed, please call the above phone number to schedule.    Type of training and number of hours: Previous Diagnosis: Follow-up DSMT - 2 hours.    Medicare covers: 10 hours of initial DSMT in 12 month period from the time of first visit, plus 2 hours of follow-up DSMT annually, and additional hours as requested for insulin training.    Diabetes Type: Type 2 - On Insulin             Diabetes Co-Morbidities: hypertension               A1C Goal:  <7.0       A1C is: Lab Results       Component                Value               Date                       A1C                      9.6                 12/07/2017              Diabetes Education Topics: Comprehensive Knowledge Assessment and Instruction    Special Educational  Needs Requiring Individual DSMT: Additional Insulin Training       MEDICAL NUTRITION THERAPY (MNT) for Diabetes    Medical Nutrition Therapy with a Registered Dietitian can be provided in coordination with Diabetes Self-Management Training to assist in achieving optimal diabetes management.    MNT Type and Hours: Do not initiate MNT at this time.                       Medicare will cover: 3 hours initial MNT in 12 month period after first visit, plus 2 hours of follow-up MNT annually    Please be aware that coverage of these services is subject to the terms and limitations of your health insurance plan.  Call member services at your health plan to determine Diabetes Self-Management Training (Codes  &amp; ) and Medical Nutrition Therapy (Codes 54381 & 88854) benefits and ask which blood glucose monitor brands are covered by your plan.  Please bring the following with you to your appointment:    (1)  List of current medications   (2)  List of Blood Glucose Monitor brands that are covered by your insurance plan  (3)  Blood Glucose Monitor and log book  (4)   Food records for the 3 days prior to your visit    The Certified Diabetes Educator may make diabetes medication adjustments per the CDE Protocol and Collaborative Practice Agreement.                  Your next 10 appointments already scheduled     Jan 09, 2018  9:30 AM CST   LAB with LAB FIRST FLOOR [facility] ([facility])    [address] [place] [id number] [phone]           Please do not eat 10-12 hours before your appointment if you are coming in fasting for labs on lipids, cholesterol, or glucose (sugar). This does not apply to pregnant women. Water, hot tea and black coffee (with nothing added) are okay. Do not drink other fluids, diet soda or chew gum.            Jan 09, 2018 10:00 AM CST   Return Visit with Sánchez Dias MD   [facility] ([facility]  "Clinics)    [address] [place] [address]   [phone]            Jun 28, 2018 11:20 AM CDT   LAB with LAB FIRST FLOOR [facility] ([facility])    [address] [place] [address]   [phone]           Please do not eat 10-12 hours before your appointment if you are coming in fasting for labs on lipids, cholesterol, or glucose (sugar). This does not apply to pregnant women. Water, hot tea and black coffee (with nothing added) are okay. Do not drink other fluids, diet soda or chew gum.            Jun 28, 2018 11:40 AM CDT   Return Visit with Jeremiah Perdomo MD   [facility] ([facility])    [address] [place] [address]   [phone]              Who to contact     If you have questions or need follow up information about today's clinic visit or your schedule please contact [facility] directly at [phone].  Normal or non-critical lab and imaging results will be communicated to you by [employer] Inchart, letter or phone within 4 business days after the clinic has received the results. If you do not hear from us within 7 days, please contact the clinic through [employer] Inchart or phone. If you have a critical or abnormal lab result, we will notify you by phone as soon as possible.  Submit refill requests through GodTube or call your pharmacy and they will forward the refill request to us. Please allow 3 business days for your refill to be completed.          Additional Information About Your Visit        GodTube Information     [employer]Tube lets you send messages to your doctor, view your test results, renew your prescriptions, schedule appointments and more. To sign up, go to [email or web address] . Click on \"Log in\" on the left side of the screen, which will take you to the Welcome page. Then click on \"Sign up Now\" on the right side of the page.     You will be asked to enter the access code " listed below, as well as some personal information. Please follow the directions to create your username and password.     Your access code is: [email or web address]  Expires: 3/7/2018  9:45 AM     Your access code will  in 90 days. If you need help or a new code, please call your [place] clinic or [phone].        Care EveryWhere ID     This is your Care EveryWhere ID. This could be used by other organizations to access your [place] medical records  [id number]        Your Vitals Were     Pulse Temperature Respirations BMI (Body Mass Index)          104 99.7  F (37.6  C) (Tympanic) 18 33.24 kg/m2         Blood Pressure from Last 3 Encounters:   18 151/76   17 144/71   17 138/70    Weight from Last 3 Encounters:   18 196 lb 11.2 oz (89.2 kg)   17 199 lb 14.4 oz (90.7 kg)   17 198 lb 8 oz (90 kg)              We Performed the Following     DIABETES EDUCATOR REFERRAL          Where to get your medicines      [facility] [place] [id number]  [address], [place] [address]    H[facility]:  [facility] [place] Phone:  [phone]     blood glucose monitoring test strip    dulaglutide 0.75 MG/0.5ML pen          Primary Care Provider Office Phone # Fax #    Jaleesa Shirley Velasquez, VERONIQUE [phone] [address] [facility] [address]        Equal Access to Services     [facility] Hospital[facility]NEDY : Hadii juan manuel moreno hadasho Sotrace, waaxda luqadaha, qaybta kaalmada alejo, aaron echeverria . So [facility] [phone].    ATENCIÓN: Si habla español, tiene a gil disposición servicios gratuitos de asistencia lingüística. Llame al [phone].    We comply with applicable federal civil rights laws and [place] laws. We do not discriminate on the basis of race, color, national origin, age, disability, sex, sexual orientation, or gender identity.            Thank you!     Thank you for  choosing [facility]  for your care. Our goal is always to provide you with excellent care. Hearing back from our patients is one way we can continue to improve our services. Please take a few minutes to complete the written survey that you may receive in the mail after your visit with us. Thank you!             Your Updated Medication List - Protect others around you: Learn how to safely use, store and throw away your medicines at www.disposemymeds.org.          This list is accurate as of: 1/3/18  3:51 PM.  Always use your most recent med list.                   Brand Name Dispense Instructions for use Diagnosis    ABILIFY 30 MG tablet   Generic drug:  ARIPiprazole      Take 30 mg by mouth daily        amLODIPine 5 MG tablet    NORVASC    90 tablet    Take 1 tablet (5 mg) by mouth daily    Essential hypertension       blood glucose lancets standard    no brand specified    1    needs lancet     Type II or unspecified type diabetes mellitus without mention of complication, not stated as uncontrolled, Mild persistent asthma       blood glucose monitoring test strip    no brand specified    100 each    1 strip by In Vitro route daily Use to test blood sugar 1 times daily  And as needed ACCU Check Haylee Plus Test Strips    Type 2 diabetes mellitus with stage 3 chronic kidney disease, without long-term current use of insulin (H)       chlorthalidone 25 MG tablet    HYGROTON    45 tablet    Take 0.5 tablets (12.5 mg) by mouth daily    Essential hypertension       Clozapine 200 MG tablet    CLOZARIL     Take 200 mg by mouth 2 times daily        dulaglutide 0.75 MG/0.5ML pen    TRULICITY    6 mL    Inject 0.75 mg Subcutaneous every 7 days    Morbid obesity (H), Type 2 diabetes mellitus with stage 3 chronic kidney disease, without long-term current use of insulin (H)       ferrous sulfate 325 (65 FE) MG tablet    IRON    30 tablet    Take 1 tablet (325 mg) by mouth daily (with breakfast)    Other  iron deficiency anemia       FLUoxetine 20 MG capsule    PROzac     Take 20 mg by mouth daily        glipiZIDE 10 MG tablet    GLUCOTROL    60 tablet    Take 1 tablet (10 mg) by mouth 2 times daily (before meals)    Type 2 diabetes mellitus with stage 3 chronic kidney disease, without long-term current use of insulin (H)       insulin glargine 100 UNIT/ML injection    LANTUS SOLOSTAR    3 mL    Inject 7 units daily morning, increase by 2 units every 4 days until fasting BG less then 140 consistently    Type 2 diabetes mellitus with stage 3 chronic kidney disease, without long-term current use of insulin (H)       MELATONIN PO      Take 3 mg by mouth nightly as needed        montelukast 10 MG tablet    SINGULAIR    90 tablet    Take 1 tablet (10 mg) by mouth At Bedtime        naltrexone 50 MG tablet    DEPADE;REVIA     Take 50 mg by mouth every morning        omeprazole 20 MG CR capsule    priLOSEC    30 capsule    1 capsule bid    Gastroesophageal reflux disease without esophagitis       ranitidine 300 MG tablet    ZANTAC    90 tablet    Take 1 tablet (300 mg) by mouth At Bedtime    Gastroesophageal reflux disease without esophagitis

## 2018-01-03 NOTE — PROGRESS NOTES
SUBJECTIVE:   Divina Delgadillo is a 32 year old female who presents to clinic today for the following health issues:   Divina still continue to have high blood sugars, but compare to results 2 weeks ago doing better. Her morning BG's numbers ranging from 146 to 236. She lost 3 lbs and states eating healthier.    Diabetes Follow-up    Patient is checking blood sugars: three times daily.   Blood sugar testing frequency justification: Adjustment of medication(s)  Results are as follows:       am - 140-200            postprandial after lunch- varies       bedtime - varies usually high        Diabetic concerns: blood sugar frequently over 200     Symptoms of hypoglycemia (low blood sugar): none     Paresthesias (numbness or burning in feet) or sores: Yes bilateral     Date of last diabetic eye exam: December 2017  BP Readings from Last 2 Encounters:   01/03/18 130/62   12/14/17 144/71     Hemoglobin A1C (%)   Date Value   12/07/2017 9.6 (H)   09/07/2017 7.7 (H)     LDL Cholesterol Calculated (mg/dL)   Date Value   06/15/2017 192 (H)   03/12/2009 80     Problem list and histories reviewed & adjusted, as indicated.  Additional history: as documented    Labs reviewed in EPIC    Reviewed and updated as needed this visit by clinical staffTobacco  Allergies  Med Hx  Surg Hx  Fam Hx  Soc Hx      Reviewed and updated as needed this visit by Provider         ROS:  Constitutional, HEENT, cardiovascular, pulmonary, gi and gu systems are negative, except as otherwise noted.      OBJECTIVE:   /62  Pulse 104  Temp 99.7  F (37.6  C) (Tympanic)  Resp 18  Wt 196 lb 11.2 oz (89.2 kg)  BMI 33.24 kg/m2  Body mass index is 33.24 kg/(m^2).  GENERAL: healthy, alert and no distress  RESP: lungs clear to auscultation - no rales, rhonchi or wheezes  CV: regular rates and rhythm, normal S1 S2, no S3 or S4, grade 3/6 systolic murmur heard best over the apex, peripheral pulses strong and no peripheral edema  MS: no gross  musculoskeletal defects noted, no edema  NEURO: Normal strength and tone, mentation intact and speech normal  PSYCH: mentation appears normal, affect normal/bright    Diagnostic Test Results:  none     ASSESSMENT/PLAN:     1. Type 2 diabetes mellitus with stage 3 chronic kidney disease, without long-term current use of insulin (H)    - blood glucose monitoring (NO BRAND SPECIFIED) test strip; 1 strip by In Vitro route daily Use to test blood sugar 1 times daily  And as needed ACCU Check Haylee Plus Test Strips  Dispense: 100 each; Refill: 1  - DIABETES EDUCATOR REFERRAL  - insulin glargine (LANTUS SOLOSTAR) 100 UNIT/ML injection; Inject 19 units daily morning, increase by 2 units every 4 days until fasting BG less then 140 consistently  Dispense: 3 mL; Refill: 1  - dulaglutide (TRULICITY) 1.5 MG/0.5ML pen; Inject 1.5 mg Subcutaneous every 7 days  Dispense: 6 mL; Refill: 1  -continue Glipizide 10 mg twice daily   -follow up in 3 weeks     2. Morbid obesity (H)    - DIABETES EDUCATOR REFERRAL    See Patient Instructions    VERONIQUE Spears [facility]

## 2018-01-04 ENCOUNTER — TELEPHONE (OUTPATIENT)
Dept: FAMILY MEDICINE | Facility: CLINIC | Age: 33
End: 2018-01-04

## 2018-01-04 DIAGNOSIS — K21.9 GASTROESOPHAGEAL REFLUX DISEASE WITHOUT ESOPHAGITIS: ICD-10-CM

## 2018-01-04 NOTE — TELEPHONE ENCOUNTER
Requested Prescriptions   Pending Prescriptions Disp Refills     omeprazole (PRILOSEC) 20 MG CR capsule  Last Written Prescription Date:  10/30/17  Last Fill Quantity: 30,  # refills: 5   Last Office Visit with OSMAR, REE or [facility] prescribing provider:  17   Future Office Visit:    Next 5 appointments (look out 90 days)     2018 10:00 AM CST   Return Visit with Sánchez Dias MD   [facility] ([facility])    [address] [place] [id number]   [phone]                  30 capsule 5     Si capsule bid    PPI Protocol Passed    2018  3:43 PM       Passed - Not on Clopidogrel (unless Pantoprazole ordered)       Passed - No diagnosis of osteoporosis on record       Passed - Recent or future visit with authorizing provider's specialty    Patient had office visit in the last year or has a visit in the next 30 days with authorizing provider.  See chart review.              Passed - Patient is age 18 or older       Passed - No active pregnacy on record       Passed - No positive pregnancy test in past 12 months

## 2018-01-05 DIAGNOSIS — N18.30 CKD (CHRONIC KIDNEY DISEASE) STAGE 3, GFR 30-59 ML/MIN (H): Primary | ICD-10-CM

## 2018-01-08 NOTE — TELEPHONE ENCOUNTER
Reason for Call:  Other prescription    Detailed comments: Prilosec - Pt needs refill today, per Caregiver Lorin     Phone Number Patient can be reached at: Other phone number:  [phone]    Best Time: any    Can we leave a detailed message on this number? YES    Call taken on 1/8/2018 at 2:45 PM by Dolly Lundberg

## 2018-01-09 ENCOUNTER — OFFICE VISIT (OUTPATIENT)
Dept: NEPHROLOGY | Facility: CLINIC | Age: 33
End: 2018-01-09
Payer: MEDICARE

## 2018-01-09 ENCOUNTER — CARE COORDINATION (OUTPATIENT)
Dept: CARE COORDINATION | Facility: CLINIC | Age: 33
End: 2018-01-09

## 2018-01-09 VITALS
DIASTOLIC BLOOD PRESSURE: 73 MMHG | SYSTOLIC BLOOD PRESSURE: 151 MMHG | HEART RATE: 98 BPM | TEMPERATURE: 97.9 F | HEIGHT: 65 IN | BODY MASS INDEX: 33.02 KG/M2 | WEIGHT: 198.2 LBS

## 2018-01-09 DIAGNOSIS — E11.22 TYPE 2 DIABETES MELLITUS WITH STAGE 3 CHRONIC KIDNEY DISEASE, WITHOUT LONG-TERM CURRENT USE OF INSULIN (H): ICD-10-CM

## 2018-01-09 DIAGNOSIS — N18.30 CKD (CHRONIC KIDNEY DISEASE) STAGE 3, GFR 30-59 ML/MIN (H): ICD-10-CM

## 2018-01-09 DIAGNOSIS — I10 ESSENTIAL HYPERTENSION: Primary | ICD-10-CM

## 2018-01-09 DIAGNOSIS — N18.30 TYPE 2 DIABETES MELLITUS WITH STAGE 3 CHRONIC KIDNEY DISEASE, WITHOUT LONG-TERM CURRENT USE OF INSULIN (H): ICD-10-CM

## 2018-01-09 DIAGNOSIS — Z79.899 MEDICATION MANAGEMENT: ICD-10-CM

## 2018-01-09 DIAGNOSIS — K21.9 GASTROESOPHAGEAL REFLUX DISEASE WITHOUT ESOPHAGITIS: ICD-10-CM

## 2018-01-09 PROBLEM — E66.01 MORBID OBESITY (H): Status: ACTIVE | Noted: 2018-01-09

## 2018-01-09 LAB
ALBUMIN SERPL-MCNC: 3.5 G/DL (ref 3.4–5)
ANION GAP SERPL CALCULATED.3IONS-SCNC: 5 MMOL/L (ref 3–14)
BASOPHILS # BLD AUTO: 0.2 10E9/L (ref 0–0.2)
BASOPHILS NFR BLD AUTO: 1 %
BUN SERPL-MCNC: 40 MG/DL (ref 7–30)
CALCIUM SERPL-MCNC: 9.1 MG/DL (ref 8.5–10.1)
CHLORIDE SERPL-SCNC: 103 MMOL/L (ref 94–109)
CO2 SERPL-SCNC: 28 MMOL/L (ref 20–32)
CREAT SERPL-MCNC: 1.31 MG/DL (ref 0.52–1.04)
CREAT UR-MCNC: 103 MG/DL
DIFFERENTIAL METHOD BLD: ABNORMAL
EOSINOPHIL # BLD AUTO: 0.2 10E9/L (ref 0–0.7)
EOSINOPHIL NFR BLD AUTO: 1.4 %
ERYTHROCYTE [DISTWIDTH] IN BLOOD BY AUTOMATED COUNT: 13.9 % (ref 10–15)
GFR SERPL CREATININE-BSD FRML MDRD: 47 ML/MIN/1.7M2
GLUCOSE SERPL-MCNC: 273 MG/DL (ref 70–99)
HCT VFR BLD AUTO: 43.4 % (ref 35–47)
HGB BLD-MCNC: 14.2 G/DL (ref 11.7–15.7)
IMM GRANULOCYTES # BLD: 0.4 10E9/L (ref 0–0.4)
IMM GRANULOCYTES NFR BLD: 2.1 %
LYMPHOCYTES # BLD AUTO: 4.4 10E9/L (ref 0.8–5.3)
LYMPHOCYTES NFR BLD AUTO: 25.9 %
MCH RBC QN AUTO: 30.2 PG (ref 26.5–33)
MCHC RBC AUTO-ENTMCNC: 32.7 G/DL (ref 31.5–36.5)
MCV RBC AUTO: 92 FL (ref 78–100)
MONOCYTES # BLD AUTO: 1 10E9/L (ref 0–1.3)
MONOCYTES NFR BLD AUTO: 5.7 %
NEUTROPHILS # BLD AUTO: 10.8 10E9/L (ref 1.6–8.3)
NEUTROPHILS NFR BLD AUTO: 63.9 %
PHOSPHATE SERPL-MCNC: 3 MG/DL (ref 2.5–4.5)
PLATELET # BLD AUTO: 367 10E9/L (ref 150–450)
POTASSIUM SERPL-SCNC: 4.5 MMOL/L (ref 3.4–5.3)
PROT UR-MCNC: 0.25 G/L
PROT/CREAT 24H UR: 0.24 G/G CR (ref 0–0.2)
PTH-INTACT SERPL-MCNC: 59 PG/ML (ref 12–72)
RBC # BLD AUTO: 4.7 10E12/L (ref 3.8–5.2)
SODIUM SERPL-SCNC: 136 MMOL/L (ref 133–144)
WBC # BLD AUTO: 16.8 10E9/L (ref 4–11)

## 2018-01-09 PROCEDURE — 80069 RENAL FUNCTION PANEL: CPT | Performed by: CLINICAL NURSE SPECIALIST

## 2018-01-09 PROCEDURE — 85025 COMPLETE CBC W/AUTO DIFF WBC: CPT | Performed by: CLINICAL NURSE SPECIALIST

## 2018-01-09 PROCEDURE — 99214 OFFICE O/P EST MOD 30 MIN: CPT | Performed by: INTERNAL MEDICINE

## 2018-01-09 PROCEDURE — 83970 ASSAY OF PARATHORMONE: CPT | Performed by: CLINICAL NURSE SPECIALIST

## 2018-01-09 PROCEDURE — 36415 COLL VENOUS BLD VENIPUNCTURE: CPT | Performed by: CLINICAL NURSE SPECIALIST

## 2018-01-09 PROCEDURE — 84156 ASSAY OF PROTEIN URINE: CPT | Performed by: INTERNAL MEDICINE

## 2018-01-09 RX ORDER — LISINOPRIL 10 MG/1
10 TABLET ORAL DAILY
Qty: 90 TABLET | Refills: 3 | Status: SHIPPED | OUTPATIENT
Start: 2018-01-09 | End: 2018-04-25

## 2018-01-09 NOTE — Clinical Note
Dr. Velasquez - I don't have an explanation for her leukocytosis - I think it was checked by her psychiatrist? Not certain if it warrants a heme referral? Appreciate your input. Thank you, Sánchez Dias (neph)

## 2018-01-09 NOTE — PATIENT INSTRUCTIONS
1. Stop the chlorthalidone  2. Start lisinopril 10 mg daily  3. Repeat labs in 1 week in [facility]  4. Follow blood pressure at home and please update on those readings in 1 week when you go for lab ([phone] Shel[place])  5. Stop the iron

## 2018-01-09 NOTE — NURSING NOTE
"Divina Delgadillo's goals for this visit include:   Chief Complaint   Patient presents with     RECHECK     No concerns       She requests these members of her care team be copied on today's visit information: yes      PCP: Jaleesa Velasquez    Referring Provider:  No referring provider defined for this encounter.    Chief Complaint   Patient presents with     RECHECK     No concerns       Initial /73  Pulse 98  Temp 97.9  F (36.6  C)  Ht 1.638 m (5' 4.5\")  Wt 89.9 kg (198 lb 3.2 oz)  BMI 33.5 kg/m2 Estimated body mass index is 33.5 kg/(m^2) as calculated from the following:    Height as of this encounter: 1.638 m (5' 4.5\").    Weight as of this encounter: 89.9 kg (198 lb 3.2 oz).  Medication Reconciliation: complete    "

## 2018-01-09 NOTE — PROGRESS NOTES
01/09/2018     CC: CKD Stage 3, HTN    HPI: Divina Delgadillo is a 32 year old female who presents for follow-up of CKD. To review, her hx is significant for diabetes (~ 20 years), bipolar disease - was on lithium therapy for around 6 years but since our initial visit, she has since been taken off of it. We spent time discussing her kidney disease and the importance of protecting the kidney going forward. Creatinine has been 1.3-1.66 for the past year; improved slightly to 1.31 today. Last UPCR was 0.28 g/g in August. She has not been on lisinopril most recently. No NSAIDs recently. Her last A1C remained above goal at 9.6% in Dec - she has since been started on insulin. She has not been hospitalized since last visit. She has been able to decrease prilosec to once daily with ongoing control of her GERD but does not feel she can get off of it completely.        Allergies   Allergen Reactions     Latex      Tylenol [Tylenol]          Current Outpatient Prescriptions on File Prior to Visit:  insulin glargine (LANTUS SOLOSTAR) 100 UNIT/ML injection Inject 19 units daily morning, increase by 2 units every 4 days until fasting BG less then 140 consistently   dulaglutide (TRULICITY) 1.5 MG/0.5ML pen Inject 1.5 mg Subcutaneous every 7 days   blood glucose monitoring (NO BRAND SPECIFIED) test strip 1 strip by In Vitro route 3 times daily   amLODIPine (NORVASC) 5 MG tablet Take 1 tablet (5 mg) by mouth daily   ranitidine (ZANTAC) 300 MG tablet Take 1 tablet (300 mg) by mouth At Bedtime   glipiZIDE (GLUCOTROL) 10 MG tablet Take 1 tablet (10 mg) by mouth 2 times daily (before meals)   montelukast (SINGULAIR) 10 MG tablet Take 1 tablet (10 mg) by mouth At Bedtime   Clozapine (CLOZARIL) 200 MG tablet Take 200 mg by mouth 2 times daily   ARIPiprazole (ABILIFY) 30 MG tablet Take 30 mg by mouth daily   FLUoxetine (PROZAC) 20 MG capsule Take 20 mg by mouth daily   LANCETS MISC. KIT needs lancet       No current  "facility-administered medications on file prior to visit.     Past Medical History:   Diagnosis Date     Cervical high risk HPV (human papillomavirus) test positive 6/20/2017     Depressive disorder, not elsewhere classified      DVT (deep venous thrombosis) (H)      Dysmetabolic syndrome X      Esophageal reflux     GERD     Mild intermittent asthma      Other abnormal heart sounds     Heart murmur     Other specified types of schizophrenia, unspecified condition      Pancreatic cancer (H)     left adrenal gland, partial pancreas, and spleen removed; no chemo or radiation.      Type II or unspecified type diabetes mellitus without mention of complication, not stated as uncontrolled      Unspecified disorder of tympanic membrane        Past Surgical History:   Procedure Laterality Date     ADRENALECTOMY       PANCREATECTOMY PARTIAL       SPLENECTOMY       SURGICAL HISTORY OF -   10/1/2000    Tympanoplasty     SURGICAL HISTORY OF -   10/1/2000    Tonsillectomy       Social History   Substance Use Topics     Smoking status: Current Every Day Smoker     Packs/day: 1.00     Years: 4.00     Types: Cigarettes     Smokeless tobacco: Never Used      Comment: 1 packe every other day      Alcohol use No       Family History   Problem Relation Age of Onset     Respiratory Mother      ASTHMA     Allergies Mother      Depression Mother      HEART DISEASE Father      HEART VALVE REPLACEMENT     Hypertension Father      DIABETES Father      Depression Father      Respiratory Brother      Allergies Brother      Respiratory Sister      Allergies Sister      Depression Sister      Respiratory Sister      Allergies Sister      Depression Sister      KIDNEY DISEASE No family hx of        ROS: A 4 system review of systems was negative other than noted here or above.     Exam:  /73  Pulse 98  Temp 97.9  F (36.6  C)  Ht 1.638 m (5' 4.5\")  Wt 89.9 kg (198 lb 3.2 oz)  BMI 33.5 kg/m2    GENERAL APPEARANCE: alert and no " distress  NECK: supple, no adenopathy  RESP: lungs clear to auscultation   CV: regular rhythm, normal rate, no rub, no edema.   Extremities: no clubbing, cyanosis  SKIN: no rash  NEURO: mentation intact and speech normal  PSYCH: affect normal/bright    Results:    Orders Only on 01/09/2018   Component Date Value Ref Range Status     WBC 01/09/2018 16.8* 4.0 - 11.0 10e9/L Final     RBC Count 01/09/2018 4.70  3.8 - 5.2 10e12/L Final     Hemoglobin 01/09/2018 14.2  11.7 - 15.7 g/dL Final     Hematocrit 01/09/2018 43.4  35.0 - 47.0 % Final     MCV 01/09/2018 92  78 - 100 fl Final     MCH 01/09/2018 30.2  26.5 - 33.0 pg Final     MCHC 01/09/2018 32.7  31.5 - 36.5 g/dL Final     RDW 01/09/2018 13.9  10.0 - 15.0 % Final     Platelet Count 01/09/2018 367  150 - 450 10e9/L Final     Diff Method 01/09/2018 Automated Method   Final     % Neutrophils 01/09/2018 63.9  % Final     % Lymphocytes 01/09/2018 25.9  % Final     % Monocytes 01/09/2018 5.7  % Final     % Eosinophils 01/09/2018 1.4  % Final     % Basophils 01/09/2018 1.0  % Final     % Immature Granulocytes 01/09/2018 2.1  % Final     Absolute Neutrophil 01/09/2018 10.8* 1.6 - 8.3 10e9/L Final     Absolute Lymphocytes 01/09/2018 4.4  0.8 - 5.3 10e9/L Final     Absolute Monocytes 01/09/2018 1.0  0.0 - 1.3 10e9/L Final     Absolute Eosinophils 01/09/2018 0.2  0.0 - 0.7 10e9/L Final     Absolute Basophils 01/09/2018 0.2  0.0 - 0.2 10e9/L Final     Abs Immature Granulocytes 01/09/2018 0.4  0 - 0.4 10e9/L Final     Sodium 01/09/2018 136  133 - 144 mmol/L Final     Potassium 01/09/2018 4.5  3.4 - 5.3 mmol/L Final     Chloride 01/09/2018 103  94 - 109 mmol/L Final     Carbon Dioxide 01/09/2018 28  20 - 32 mmol/L Final     Anion Gap 01/09/2018 5  3 - 14 mmol/L Final     Glucose 01/09/2018 273* 70 - 99 mg/dL Final    Non Fasting     Urea Nitrogen 01/09/2018 40* 7 - 30 mg/dL Final     Creatinine 01/09/2018 1.31* 0.52 - 1.04 mg/dL Final     GFR Estimate 01/09/2018 47* >60  mL/min/1.7m2 Final    Non  GFR Calc     GFR Estimate If Black 01/09/2018 57* >60 mL/min/1.7m2 Final    African American GFR Calc     Calcium 01/09/2018 9.1  8.5 - 10.1 mg/dL Final     Phosphorus 01/09/2018 3.0  2.5 - 4.5 mg/dL Final     Albumin 01/09/2018 3.5  3.4 - 5.0 g/dL Final        Assessment/Plan:   1. CKD Stage 3: risk factors for kidney disease include longstanding hypertension. Diabetes, as well as longterm lithium use. She is now away from lithium which is great to see given she is already at risk for diabetic nephropathy and would like to minimize risk. I was holding off on starting lithium with hopes of seeing improvement in her function with time away from lithium. I now feel we are likely at her baseline function. I am going to start lisinopril 10 mg dialy for renoprotective effects. Will repeat labs in a week to assure stable function. We will continue to titrate until BP optimized on lisinopril alone ideally. Educated on importance of good diabetes control.     2. DM: uncontrolled - now started on insulin with hopes of getting diabetes controlled.     3. Bipolar: now off of lithium.     4. GERD: ideally would avoid PPI therapy given increased risk of incident CKD and AIN noted with PPI therapy. Tolerating once daily dosing - I am not certain we will be able to get away from PPI completely given the severity of her sxs previously.     5. Hypertension: blood pressure is above goal today. I would like to get her back on lisinopril for renoprotective effects given presumed diabetic nephropathy. I am going to start 10 mg of lisinopril and DC chlorthalidone. We will follow-up labs in a week. If stable, will continue to titrate the lisinopril as able - ideally would wean off norvasc as well and be on single agent lisinopril going forward.     6. Anemia: resolved - hemoglobin is well within normal today - I feel she can stop the oral iron for now. Will update Dr. Muñoz.     7.  Leukocytosis: has been present dating back to at least 2015 - I dont' see that this has been worked up through hematology. I will update Dr. Velasquez and ask for her input as to whether she would recommend this now.     Patient Instructions   1. Stop the chlorthalidone  2. Start lisinopril 10 mg daily  3. Repeat labs in 1 week in [facility] [place]  4. Follow blood pressure at home and please update on those readings in 1 week when you go for lab ([phone] Shelley)  5. Stop the iron     Sánchez Dias, DO

## 2018-01-09 NOTE — PROGRESS NOTES
Clinic Care Coordination Contact    Situation: Patient chart reviewed by care coordinator.    Background: Patient open to active CC. Has foster mother, Lorin, who contacts this RN CC on behalf of patient (consent to communicate in chart). On last contact, Lorin was concerned as patient had been continuing to gain weight and A1C had gone up. They had just seen PCP and was started on Trulicity for Diabetes. She was trying to get f/u scheduled for diabetic education. Since then patient has seen GI and PCP. PCP reports 3# weight loss and better diabetic control. Patient has renal f/u today nd diabetic ed scheduled fr 1/29. No new issues noted today.    Assessment: Patient appears to be taking meds and following diet. Has had wt loss per PCP. No new CC needs identifies at this time.    Plan/Recommendations: RN CC will f/u after next DE appointment.    Jose De Jesus COTA,RN- BC  Clinic Care Coordinator  [facility]  Phone: [phone]

## 2018-01-09 NOTE — MR AVS SNAPSHOT
After Visit Summary   1/9/2018    Divina Delgadillo    MRN: [id number]           Patient Information     Date Of Birth          1985        Visit Information        Provider Department      1/9/2018 10:00 AM Sánchez Dias MD [facility]        Today's Diagnoses     Essential hypertension    -  1    Gastroesophageal reflux disease without esophagitis          Care Instructions    1. Stop the chlorthalidone  2. Start lisinopril 10 mg daily  3. Repeat labs in 1 week in [facility]  4. Follow blood pressure at home and please update on those readings in 1 week when you go for lab ([phone] Shelley)  5. Stop the iron          Follow-ups after your visit        Your next 10 appointments already scheduled     Jan 29, 2018  3:00 PM CST   Diabetic Education with [place] [facility]   [facility] ([facility])    [address] [facility] [address]   [phone]            Jun 28, 2018 11:20 AM CDT   LAB with LAB FIRST FLOOR [facility])    [address] [place] [id number] [phone]           Please do not eat 10-12 hours before your appointment if you are coming in fasting for labs on lipids, cholesterol, or glucose (sugar). This does not apply to pregnant women. Water, hot tea and black coffee (with nothing added) are okay. Do not drink other fluids, diet soda or chew gum.            Jun 28, 2018 11:40 AM CDT   Return Visit with Jeremiah Perdomo MD   [facility])    [address] [facility] [address] [phone]            Jul 10, 2018  9:30 AM CDT   LAB with LAB FIRST FLOOR [facility])    [address] [facility] [address] [phone]           Please do not eat 10-12 hours before your appointment if you are coming in  fasting for labs on lipids, cholesterol, or glucose (sugar). This does not apply to pregnant women. Water, hot tea and black coffee (with nothing added) are okay. Do not drink other fluids, diet soda or chew gum.            Jul 10, 2018 10:00 AM CDT   Return Visit with Sánchez Dias MD   [facility] ([facility])    [address] [place] [address] [phone]              Who to contact     If you have questions or need follow up information about today's clinic visit or your schedule please contact [facility] directly at [phone].  Normal or non-critical lab and imaging results will be communicated to you by Cramsterhart, letter or phone within 4 business days after the clinic has received the results. If you do not hear from us within 7 days, please contact the clinic through Cramsterhart or phone. If you have a critical or abnormal lab result, we will notify you by phone as soon as possible.  Submit refill requests through SchoolFeed or call your pharmacy and they will forward the refill request to us. Please allow 3 business days for your refill to be completed.          Additional Information About Your Visit        SchoolFeed Information     SchoolFeed is an electronic gateway that provides easy, online access to your medical records. With SchoolFeed, you can request a clinic appointment, read your test results, renew a prescription or communicate with your care team.     To sign up for SchoolFeed visit the website at [email or web address]   You will be asked to enter the access code listed below, as well as some personal information. Please follow the directions to create your username and password.     Your access code is: [email or web address]  Expires: 3/7/2018  9:45 AM     Your access code will  in 90 days. If you need help or a new code, please contact your [facility] or call [phone] for assistance.       "  Care EveryWhere ID     This is your Care EveryWhere ID. This could be used by other organizations to access your [facility] medical records  [id number]        Your Vitals Were     Pulse Temperature Height BMI (Body Mass Index)          98 97.9  F (36.6  C) 1.638 m (5' 4.5\") 33.5 kg/m2         Blood Pressure from Last 3 Encounters:   01/09/18 151/73   01/03/18 130/62   12/14/17 144/71    Weight from Last 3 Encounters:   01/09/18 89.9 kg (198 lb 3.2 oz)   01/03/18 89.2 kg (196 lb 11.2 oz)   12/14/17 90.7 kg (199 lb 14.4 oz)              Today, you had the following     No orders found for display         Today's Medication Changes          These changes are accurate as of: 1/9/18 10:28 AM.  If you have any questions, ask your nurse or doctor.               Start taking these medicines.        Dose/Directions    lisinopril 10 MG tablet   Commonly known as:  PRINIVIL/ZESTRIL   Used for:  Essential hypertension   Started by:  Sánchez Dias MD        Dose:  10 mg   Take 1 tablet (10 mg) by mouth daily   Quantity:  90 tablet   Refills:  3         These medicines have changed or have updated prescriptions.        Dose/Directions    omeprazole 20 MG CR capsule   Commonly known as:  priLOSEC   This may have changed:    - how much to take  - how to take this  - when to take this   Used for:  Gastroesophageal reflux disease without esophagitis   Changed by:  Sánchez Dias MD        Dose:  20 mg   Take 1 capsule (20 mg) by mouth daily 1 capsule bid   Quantity:  60 capsule   Refills:  5         Stop taking these medicines if you haven't already. Please contact your care team if you have questions.     chlorthalidone 25 MG tablet   Commonly known as:  HYGROTON   Stopped by:  Sánchez Dias MD           ferrous sulfate 325 (65 FE) MG tablet   Commonly known as:  IRON   Stopped by:  Sánchez Dias MD           MELATONIN PO   Stopped by:  Sánchez Dias MD           naltrexone 50 MG tablet "   Commonly known as:  DEPADE;REVIA   Stopped by:  Sánchez Dias MD                Where to get your medicines      These medications were sent to [place] [facility] - [place], [place] - [address]  [address], [place] [address]    Hours:  [facility] [place] [employer] Phone:  [phone]     lisinopril 10 MG tablet                Primary Care Provider Office Phone # Fax #    Jaleesa Velasquez, APRN [phone] [address] [facility] [address]        Equal Access to Services     [facility]: Hadii aad ku hadasho Soomaali, waaxda luqadaha, qaybta kaalmada adeegyada, waxay idiin hayaan adeeg kharabambi ceheverria . So [facility] [phone].    ATENCIÓN: Si habla español, tiene a gil disposición servicios gratuitos de asistencia lingüística. Llame al [phone].    We comply with applicable federal civil rights laws and [place] laws. We do not discriminate on the basis of race, color, national origin, age, disability, sex, sexual orientation, or gender identity.            Thank you!     Thank you for choosing [facility]  for your care. Our goal is always to provide you with excellent care. Hearing back from our patients is one way we can continue to improve our services. Please take a few minutes to complete the written survey that you may receive in the mail after your visit with us. Thank you!             Your Updated Medication List - Protect others around you: Learn how to safely use, store and throw away your medicines at www.disposemymeds.org.          This list is accurate as of: 1/9/18 10:28 AM.  Always use your most recent med list.                   Brand Name Dispense Instructions for use Diagnosis    ABILIFY 30 MG tablet   Generic drug:  ARIPiprazole      Take 30 mg by mouth daily        amLODIPine 5 MG tablet    NORVASC    90 tablet    Take 1 tablet (5 mg) by mouth daily    Essential hypertension       blood glucose lancets  standard    no brand specified    1    needs lancet     Type II or unspecified type diabetes mellitus without mention of complication, not stated as uncontrolled, Mild persistent asthma       blood glucose monitoring test strip    no brand specified    100 each    1 strip by In Vitro route 3 times daily    Type 2 diabetes mellitus with stage 3 chronic kidney disease, without long-term current use of insulin (H)       Clozapine 200 MG tablet    CLOZARIL     Take 200 mg by mouth 2 times daily        dulaglutide 1.5 MG/0.5ML pen    TRULICITY    6 mL    Inject 1.5 mg Subcutaneous every 7 days    Type 2 diabetes mellitus with stage 3 chronic kidney disease, without long-term current use of insulin (H)       FLUoxetine 20 MG capsule    PROzac     Take 20 mg by mouth daily        glipiZIDE 10 MG tablet    GLUCOTROL    60 tablet    Take 1 tablet (10 mg) by mouth 2 times daily (before meals)    Type 2 diabetes mellitus with stage 3 chronic kidney disease, without long-term current use of insulin (H)       insulin glargine 100 UNIT/ML injection    LANTUS SOLOSTAR    3 mL    Inject 19 units daily morning, increase by 2 units every 4 days until fasting BG less then 140 consistently    Type 2 diabetes mellitus with stage 3 chronic kidney disease, without long-term current use of insulin (H)       lisinopril 10 MG tablet    PRINIVIL/ZESTRIL    90 tablet    Take 1 tablet (10 mg) by mouth daily    Essential hypertension       montelukast 10 MG tablet    SINGULAIR    90 tablet    Take 1 tablet (10 mg) by mouth At Bedtime        omeprazole 20 MG CR capsule    priLOSEC    60 capsule    Take 1 capsule (20 mg) by mouth daily 1 capsule bid    Gastroesophageal reflux disease without esophagitis       ranitidine 300 MG tablet    ZANTAC    90 tablet    Take 1 tablet (300 mg) by mouth At Bedtime    Gastroesophageal reflux disease without esophagitis

## 2018-01-16 ENCOUNTER — OFFICE VISIT (OUTPATIENT)
Dept: FAMILY MEDICINE | Facility: CLINIC | Age: 33
End: 2018-01-16
Payer: MEDICARE

## 2018-01-16 VITALS
SYSTOLIC BLOOD PRESSURE: 130 MMHG | WEIGHT: 198 LBS | DIASTOLIC BLOOD PRESSURE: 70 MMHG | BODY MASS INDEX: 32.99 KG/M2 | HEART RATE: 106 BPM | OXYGEN SATURATION: 98 % | TEMPERATURE: 97.8 F | HEIGHT: 65 IN | RESPIRATION RATE: 16 BRPM

## 2018-01-16 DIAGNOSIS — H66.003 ACUTE SUPPURATIVE OTITIS MEDIA OF BOTH EARS WITHOUT SPONTANEOUS RUPTURE OF TYMPANIC MEMBRANES, RECURRENCE NOT SPECIFIED: Primary | ICD-10-CM

## 2018-01-16 DIAGNOSIS — J01.00 ACUTE MAXILLARY SINUSITIS, RECURRENCE NOT SPECIFIED: ICD-10-CM

## 2018-01-16 DIAGNOSIS — J45.21 MILD INTERMITTENT ASTHMA WITH ACUTE EXACERBATION: ICD-10-CM

## 2018-01-16 PROCEDURE — 99214 OFFICE O/P EST MOD 30 MIN: CPT | Performed by: NURSE PRACTITIONER

## 2018-01-16 RX ORDER — ALBUTEROL SULFATE 90 UG/1
2 AEROSOL, METERED RESPIRATORY (INHALATION) EVERY 6 HOURS PRN
Qty: 1 INHALER | Refills: 3 | Status: SHIPPED | OUTPATIENT
Start: 2018-01-16 | End: 2020-01-20

## 2018-01-16 RX ORDER — AMOXICILLIN 875 MG
875 TABLET ORAL 2 TIMES DAILY
Qty: 20 TABLET | Refills: 0 | Status: SHIPPED | OUTPATIENT
Start: 2018-01-16 | End: 2018-01-26

## 2018-01-16 ASSESSMENT — ASTHMA QUESTIONNAIRES
ACT_TOTALSCORE: 25
QUESTION_1 LAST FOUR WEEKS HOW MUCH OF THE TIME DID YOUR ASTHMA KEEP YOU FROM GETTING AS MUCH DONE AT WORK, SCHOOL OR AT HOME: NONE OF THE TIME
QUESTION_3 LAST FOUR WEEKS HOW OFTEN DID YOUR ASTHMA SYMPTOMS (WHEEZING, COUGHING, SHORTNESS OF BREATH, CHEST TIGHTNESS OR PAIN) WAKE YOU UP AT NIGHT OR EARLIER THAN USUAL IN THE MORNING: NOT AT ALL
QUESTION_4 LAST FOUR WEEKS HOW OFTEN HAVE YOU USED YOUR RESCUE INHALER OR NEBULIZER MEDICATION (SUCH AS ALBUTEROL): NOT AT ALL
QUESTION_2 LAST FOUR WEEKS HOW OFTEN HAVE YOU HAD SHORTNESS OF BREATH: NOT AT ALL
QUESTION_5 LAST FOUR WEEKS HOW WOULD YOU RATE YOUR ASTHMA CONTROL: COMPLETELY CONTROLLED

## 2018-01-16 NOTE — PROGRESS NOTES
SUBJECTIVE:   Divina Delgadillo is a 32 year old female who presents to clinic today for the following health issues:    Here with group home staff-    ENT Symptoms             Symptoms: cc Present Absent Comment   Fever/Chills   x    Fatigue  x     Muscle Aches  x     Eye Irritation   x    Sneezing   x    Nasal Jose L/Drg  x  Yellow    Sinus Pressure/Pain   x    Loss of smell  x  And taste   Dental pain   x    Sore Throat  x     Swollen Glands   x    Ear Pain/Fullness   x    Cough  x  Nonproductive-loosening up   Wheeze  x     Chest Pain   x    Shortness of breath  x     Rash   x    Other         Symptom duration:  5 days    Symptom severity:  mild to moderate   Treatments tried:  dayquil, nyquil   Contacts:  people in home with colds     PROBLEMS TO ADD ON:    Subjective:    No issues for many years. Having dyspnea, chest tightness with illness. Gave her rescue inhaler to her mother.   Asthma: ACT Score is 25.  Asthma urgent care visits in past 12 months? 0      Upcoming appointment for Asthma Education? No    Problem list and histories reviewed & adjusted, as indicated.  Additional history: as documented    Patient Active Problem List   Diagnosis     Esophageal reflux     non alcoholic fatty liver disease     HTN (hypertension)     HYPERLIPIDEMIA LDL GOAL <100     DVT (deep venous thrombosis) (H)     CKD (chronic kidney disease) stage 3, GFR 30-59 ml/min     Malignant neoplasm of pancreas, unspecified location of malignancy (H)     Type 2 diabetes mellitus with stage 3 chronic kidney disease, without long-term current use of insulin (H)     Bipolar disorder (H)     Cervical high risk HPV (human papillomavirus) test positive     IUD (intrauterine device) in place     Morbid obesity (H)     Mild intermittent asthma with acute exacerbation     Past Surgical History:   Procedure Laterality Date     ADRENALECTOMY       PANCREATECTOMY PARTIAL       SPLENECTOMY       SURGICAL HISTORY OF -   10/1/2000    Tympanoplasty  "    SURGICAL HISTORY OF -   10/1/2000    Tonsillectomy       Social History   Substance Use Topics     Smoking status: Current Every Day Smoker     Packs/day: 1.00     Years: 4.00     Types: Cigarettes     Smokeless tobacco: Never Used      Comment: 1 packe every other day      Alcohol use No     Family History   Problem Relation Age of Onset     Respiratory Mother      ASTHMA     Allergies Mother      Depression Mother      HEART DISEASE Father      HEART VALVE REPLACEMENT     Hypertension Father      DIABETES Father      Depression Father      Respiratory Brother      Allergies Brother      Respiratory Sister      Allergies Sister      Depression Sister      Respiratory Sister      Allergies Sister      Depression Sister      KIDNEY DISEASE No family hx of              Reviewed and updated as needed this visit by clinical staffTobacco  Allergies  Meds  Med Hx  Surg Hx  Fam Hx  Soc Hx      Reviewed and updated as needed this visit by Provider         ROS:  Constitutional, HEENT, cardiovascular, pulmonary, gi and gu systems are negative, except as otherwise noted.      OBJECTIVE:   /70 (BP Location: Right arm, Patient Position: Sitting, Cuff Size: Adult Large)  Pulse 106  Temp 97.8  F (36.6  C) (Tympanic)  Resp 16  Ht 5' 4.5\" (1.638 m)  Wt 198 lb (89.8 kg)  SpO2 98%  BMI 33.46 kg/m2  Body mass index is 33.46 kg/(m^2).  GENERAL: healthy, alert and no distress  EYES: Eyes grossly normal to inspection, PERRL and conjunctivae and sclerae normal  HENT: normal cephalic/atraumatic, both ears: erythematous, bulging membrane and mucopurulent effusion, nose and mouth without ulcers or lesions, rhinorrhea purulent, oropharynx clear, oral mucous membranes moist, sinuses: maxillary tenderness on both sides and purulent mucus along posterior pharynx.  NECK: bilateral anterior cervical adenopathy and no asymmetry, masses, or scars  RESP: expiratory wheezes bibasilar  CV: regular rates and rhythm, normal S1 S2, " no S3 or S4 and grade 3/6 systolic murmur heard best over the apex  SKIN: no suspicious lesions or rashes  NEURO: Normal strength and tone, mentation intact and speech normal    Diagnostic Test Results:  none     ASSESSMENT/PLAN:       ICD-10-CM    1. Acute suppurative otitis media of both ears without spontaneous rupture of tympanic membranes, recurrence not specified H66.003 amoxicillin (AMOXIL) 875 MG tablet   2. Acute maxillary sinusitis, recurrence not specified J01.00 amoxicillin (AMOXIL) 875 MG tablet   3. Mild intermittent asthma with acute exacerbation J45.21 albuterol (PROAIR HFA/PROVENTIL HFA/VENTOLIN HFA) 108 (90 BASE) MCG/ACT Inhaler       FUTURE APPOINTMENTS:       - Follow-up visit in     Patient Instructions     Try a neti pot 2-3 times per day to help flush out the mucus.    Sinusitis (Antibiotic Treatment)    The sinuses are air-filled spaces within the bones of the face. They connect to the inside of the nose. Sinusitis is an inflammation of the tissue lining the sinus cavity. Sinus inflammation can occur during a cold. It can also be due to allergies to pollens and other particles in the air. Sinusitis can cause symptoms of sinus congestion and fullness. A sinus infection causes fever, headache and facial pain. There is often green or yellow drainage from the nose or into the back of the throat (post-nasal drip). You have been given antibiotics to treat this condition.  Home care:    Take the full course of antibiotics as instructed. Do not stop taking them, even if you feel better.    Drink plenty of water, hot tea, and other liquids. This may help thin mucus. It also may promote sinus drainage.    Heat may help soothe painful areas of the face. Use a towel soaked in hot water. Or,  the shower and direct the hot spray onto your face. Using a vaporizer along with a menthol rub at night may also help.     An expectorant containing guaifenesin may help thin the mucus and promote drainage  from the sinuses.    Over-the-counter decongestants may be used unless a similar medicine was prescribed. Nasal sprays work the fastest. Use one that contains phenylephrine or oxymetazoline. First blow the nose gently. Then use the spray. Do not use these medicines more often than directed on the label or symptoms may get worse. You may also use tablets containing pseudoephedrine. Avoid products that combine ingredients, because side effects may be increased. Read labels. You can also ask the pharmacist for help. (NOTE: Persons with high blood pressure should not use decongestants. They can raise blood pressure.)    Over-the-counter antihistamines may help if allergies contributed to your sinusitis.      Do not use nasal rinses or irrigation during an acute sinus infection, unless told to by your health care provider. Rinsing may spread the infection to other sinuses.    Use acetaminophen or ibuprofen to control pain, unless another pain medicine was prescribed. (If you have chronic liver or kidney disease or ever had a stomach ulcer, talk with your doctor before using these medicines. Aspirin should never be used in anyone under 18 years of age who is ill with a fever. It may cause severe liver damage.)    Don't smoke. This can worsen symptoms.  Follow-up care  Follow up with your healthcare provider or our staff if you are not improving within the next week.  When to seek medical advice  Call your healthcare provider if any of these occur:    Facial pain or headache becoming more severe    Stiff neck    Unusual drowsiness or confusion    Swelling of the forehead or eyelids    Vision problems, including blurred or double vision    Fever of 100.4 F (38 C) or higher, or as directed by your healthcare provider    Seizure    Breathing problems    Symptoms not resolving within 10 days  Date Last Reviewed: 4/13/2015 2000-2017 [employer]. [address] [place] [address], [place] [address]. All rights reserved.  This information is not intended as a substitute for professional medical care. Always follow your healthcare professional's instructions.        Otitis Media (Middle-Ear Infection) in Adults  Otitis media is another name for a middle-ear infection. It means an infection behind your eardrum. This kind of ear infection can happen after any condition that keeps fluid from draining from the middle ear. These conditions include allergies, a cold, a sore throat, or a respiratory infection.  Middle-ear infections are common in children, but they can also happen in adults. An ear infection in an adult may mean a more serious problem than in a child. So you may need additional tests. If you have an ear infection, you should see your health care provider for treatment.  What are the types of middle-ear infections?  Infections can affect the middle ear in several ways. They are:    Acute otitis media. This middle-ear infection occurs suddenly. It causes swelling and redness. Fluid and mucus become trapped inside the ear. You can have a fever and ear pain.    Otitis media with effusion. Fluid (effusion) and mucus build up in the middle ear after the infection goes away. You may feel like your middle ear is full. This can continue for months and may affect your hearing.    Chronic otitis media with effusion. Fluid (effusion) remains in the middle ear for a long time. Or it builds up again and again, even though there is no infection. This type of middle-ear infection may be hard to treat. It may also affect your hearing.  Who is more likely to get a middle-ear infection?  You are more likely to get an ear infection if you:    Smoke or are around someone who smokes    Have seasonal or year-round allergy symptoms    Have a cold or other upper respiratory infection  What causes a middle-ear infection?  The middle ear connects to the throat by a canal called the eustachian tube. This tube helps even out the pressure between the  outer ear and the inner ear. A cold or allergy can irritate the tube or cause the area around it to swell. This can keep fluid from draining from the middle ear. The fluid builds up behind the eardrum. Bacteria and viruses can grow in this fluid. The bacteria and viruses cause the middle-ear infection.  What are the symptoms of a middle-ear infection?  Common symptoms of a middle-ear infection in adults are:    Pain in 1 or both ears    Drainage from the ear    Muffled hearing    Sore throat   You may also have a fever. Rarely, your balance can be affected.  These symptoms may be the same as for other conditions. It s important to talk with your health care provider if you think you have a middle-ear infection. If you have a high fever, severe pain behind your ear, or paralysis in your face, see your provider as soon as you can.  How is a middle-ear infection diagnosed?  Your health care provider will take a medical history and do a physical exam. He or she will look at the outer ear and eardrum with an otoscope. The otoscope is a lighted tool that lets your provider see inside the ear. A pneumatic otoscope blows a puff of air into the ear to check how well your eardrum moves. If you eardrum doesn t move well, it may mean you have fluid behind it.  Your provider may also do a test called tympanometry. This test tells how well the middle ear is working. It can find any changes in pressure in the middle ear. Your provider may test your hearing with a tuning fork.  How is a middle-ear infection treated?  A middle-ear infection may be treated with:    Antibiotics, taken by mouth or as ear drops    Medication for pain    Decongestants, antihistamines, or nasal steroids  Your health care provider may also have you try autoinsufflation. This helps adjust the air pressure in your ear. For this, you pinch your nose and gently exhale. This forces air back through the eustachian tube.  The exact treatment for your ear  infection will depend on the type of infection you have. In general, if your symptoms don t get better in 48 to 72 hours, contact your health care provider.  Middle-ear infections can cause long-term problems if not treated. They can lead to:    Infection in other parts of the head    Permanent hearing loss    Paralysis of a nerve in your face  If you have a middle-ear infection that doesn t get better, you may need to see an ear, nose, and throat specialist (otolaryngologist). You may need a CT scan or MRI to check for head and neck cancer.  Ear tubes  Sometimes fluid stays in the middle ear even after you take antibiotics and the infection goes away. In this case, your health care provider may suggest that a small tube be placed in your ear. The tube is put at the opening of the eardrum. The tube keeps fluid from building up and relieves pressure in the middle ear. It can also help you hear better. This surgery is called myringotomy. It is not often done in adults.  The tubes usually fall out on their own after 6 months to a year.    2562-2105 [employer]. [address] [place] [address]. All rights reserved. This information is not intended as a substitute for professional medical care. Always follow your healthcare professional's instructions.            VERONI[facility]

## 2018-01-16 NOTE — NURSING NOTE
"Chief Complaint   Patient presents with     Cold Symptoms       Initial /70 (BP Location: Right arm, Patient Position: Sitting, Cuff Size: Adult Large)  Pulse 106  Temp 97.8  F (36.6  C) (Tympanic)  Resp 16  Ht 5' 4.5\" (1.638 m)  Wt 198 lb (89.8 kg)  SpO2 98%  BMI 33.46 kg/m2 Estimated body mass index is 33.46 kg/(m^2) as calculated from the following:    Height as of this encounter: 5' 4.5\" (1.638 m).    Weight as of this encounter: 198 lb (89.8 kg).  Medication Reconciliation: complete  "

## 2018-01-16 NOTE — PATIENT INSTRUCTIONS
Try a neti pot 2-3 times per day to help flush out the mucus.    Sinusitis (Antibiotic Treatment)    The sinuses are air-filled spaces within the bones of the face. They connect to the inside of the nose. Sinusitis is an inflammation of the tissue lining the sinus cavity. Sinus inflammation can occur during a cold. It can also be due to allergies to pollens and other particles in the air. Sinusitis can cause symptoms of sinus congestion and fullness. A sinus infection causes fever, headache and facial pain. There is often green or yellow drainage from the nose or into the back of the throat (post-nasal drip). You have been given antibiotics to treat this condition.  Home care:    Take the full course of antibiotics as instructed. Do not stop taking them, even if you feel better.    Drink plenty of water, hot tea, and other liquids. This may help thin mucus. It also may promote sinus drainage.    Heat may help soothe painful areas of the face. Use a towel soaked in hot water. Or,  the shower and direct the hot spray onto your face. Using a vaporizer along with a menthol rub at night may also help.     An expectorant containing guaifenesin may help thin the mucus and promote drainage from the sinuses.    Over-the-counter decongestants may be used unless a similar medicine was prescribed. Nasal sprays work the fastest. Use one that contains phenylephrine or oxymetazoline. First blow the nose gently. Then use the spray. Do not use these medicines more often than directed on the label or symptoms may get worse. You may also use tablets containing pseudoephedrine. Avoid products that combine ingredients, because side effects may be increased. Read labels. You can also ask the pharmacist for help. (NOTE: Persons with high blood pressure should not use decongestants. They can raise blood pressure.)    Over-the-counter antihistamines may help if allergies contributed to your sinusitis.      Do not use nasal rinses  or irrigation during an acute sinus infection, unless told to by your health care provider. Rinsing may spread the infection to other sinuses.    Use acetaminophen or ibuprofen to control pain, unless another pain medicine was prescribed. (If you have chronic liver or kidney disease or ever had a stomach ulcer, talk with your doctor before using these medicines. Aspirin should never be used in anyone under 18 years of age who is ill with a fever. It may cause severe liver damage.)    Don't smoke. This can worsen symptoms.  Follow-up care  Follow up with your healthcare provider or our staff if you are not improving within the next week.  When to seek medical advice  Call your healthcare provider if any of these occur:    Facial pain or headache becoming more severe    Stiff neck    Unusual drowsiness or confusion    Swelling of the forehead or eyelids    Vision problems, including blurred or double vision    Fever of 100.4 F (38 C) or higher, or as directed by your healthcare provider    Seizure    Breathing problems    Symptoms not resolving within 10 days  Date Last Reviewed: 4/13/2015 2000-2017 [employer]. [address] [place] [address]. All rights reserved. This information is not intended as a substitute for professional medical care. Always follow your healthcare professional's instructions.        Otitis Media (Middle-Ear Infection) in Adults  Otitis media is another name for a middle-ear infection. It means an infection behind your eardrum. This kind of ear infection can happen after any condition that keeps fluid from draining from the middle ear. These conditions include allergies, a cold, a sore throat, or a respiratory infection.  Middle-ear infections are common in children, but they can also happen in adults. An ear infection in an adult may mean a more serious problem than in a child. So you may need additional tests. If you have an ear infection, you should see your  health care provider for treatment.  What are the types of middle-ear infections?  Infections can affect the middle ear in several ways. They are:    Acute otitis media. This middle-ear infection occurs suddenly. It causes swelling and redness. Fluid and mucus become trapped inside the ear. You can have a fever and ear pain.    Otitis media with effusion. Fluid (effusion) and mucus build up in the middle ear after the infection goes away. You may feel like your middle ear is full. This can continue for months and may affect your hearing.    Chronic otitis media with effusion. Fluid (effusion) remains in the middle ear for a long time. Or it builds up again and again, even though there is no infection. This type of middle-ear infection may be hard to treat. It may also affect your hearing.  Who is more likely to get a middle-ear infection?  You are more likely to get an ear infection if you:    Smoke or are around someone who smokes    Have seasonal or year-round allergy symptoms    Have a cold or other upper respiratory infection  What causes a middle-ear infection?  The middle ear connects to the throat by a canal called the eustachian tube. This tube helps even out the pressure between the outer ear and the inner ear. A cold or allergy can irritate the tube or cause the area around it to swell. This can keep fluid from draining from the middle ear. The fluid builds up behind the eardrum. Bacteria and viruses can grow in this fluid. The bacteria and viruses cause the middle-ear infection.  What are the symptoms of a middle-ear infection?  Common symptoms of a middle-ear infection in adults are:    Pain in 1 or both ears    Drainage from the ear    Muffled hearing    Sore throat   You may also have a fever. Rarely, your balance can be affected.  These symptoms may be the same as for other conditions. It s important to talk with your health care provider if you think you have a middle-ear infection. If you have a  high fever, severe pain behind your ear, or paralysis in your face, see your provider as soon as you can.  How is a middle-ear infection diagnosed?  Your health care provider will take a medical history and do a physical exam. He or she will look at the outer ear and eardrum with an otoscope. The otoscope is a lighted tool that lets your provider see inside the ear. A pneumatic otoscope blows a puff of air into the ear to check how well your eardrum moves. If you eardrum doesn t move well, it may mean you have fluid behind it.  Your provider may also do a test called tympanometry. This test tells how well the middle ear is working. It can find any changes in pressure in the middle ear. Your provider may test your hearing with a tuning fork.  How is a middle-ear infection treated?  A middle-ear infection may be treated with:    Antibiotics, taken by mouth or as ear drops    Medication for pain    Decongestants, antihistamines, or nasal steroids  Your health care provider may also have you try autoinsufflation. This helps adjust the air pressure in your ear. For this, you pinch your nose and gently exhale. This forces air back through the eustachian tube.  The exact treatment for your ear infection will depend on the type of infection you have. In general, if your symptoms don t get better in 48 to 72 hours, contact your health care provider.  Middle-ear infections can cause long-term problems if not treated. They can lead to:    Infection in other parts of the head    Permanent hearing loss    Paralysis of a nerve in your face  If you have a middle-ear infection that doesn t get better, you may need to see an ear, nose, and throat specialist (otolaryngologist). You may need a CT scan or MRI to check for head and neck cancer.  Ear tubes  Sometimes fluid stays in the middle ear even after you take antibiotics and the infection goes away. In this case, your health care provider may suggest that a small tube be placed in  your ear. The tube is put at the opening of the eardrum. The tube keeps fluid from building up and relieves pressure in the middle ear. It can also help you hear better. This surgery is called myringotomy. It is not often done in adults.  The tubes usually fall out on their own after 6 months to a year.    4452-5410 [employer]. [address] [place], [place] [id number]. All rights reserved. This information is not intended as a substitute for professional medical care. Always follow your healthcare professional's instructions.

## 2018-01-16 NOTE — LETTER
My Asthma Action Plan  Name: Divina Delgadillo   YOB: 1985  Date: 1/16/2018   My doctor: VERONIQUE Marion CNP   My clinic: [facility]        My Control Medicine: None  My Rescue Medicine: Albuterol (Proair/Ventolin/Proventil) inhaler 2 puffs every 4-6 hours as needed.   My Asthma Severity: intermittent  Avoid your asthma triggers: upper respiratory infections               GREEN ZONE   Good Control    I feel good    No cough or wheeze    Can work, sleep and play without asthma symptoms       Take your asthma control medicine every day.     1. If exercise triggers your asthma, take your rescue medication    15 minutes before exercise or sports, and    During exercise if you have asthma symptoms  2. Spacer to use with inhaler: If you have a spacer, make sure to use it with your inhaler             YELLOW ZONE Getting Worse  I have ANY of these:    I do not feel good    Cough or wheeze    Chest feels tight    Wake up at night   1. Keep taking your Green Zone medications  2. Start taking your rescue medicine:    every 20 minutes for up to 1 hour. Then every 4 hours for 24-48 hours.  3. If you stay in the Yellow Zone for more than 12-24 hours, contact your doctor.  4. If you do not return to the Green Zone in 12-24 hours or you get worse, start taking your oral steroid medicine if prescribed by your provider.           RED ZONE Medical Alert - Get Help  I have ANY of these:    I feel awful    Medicine is not helping    Breathing getting harder    Trouble walking or talking    Nose opens wide to breathe       1. Take your rescue medicine NOW  2. If your provider has prescribed an oral steroid medicine, start taking it NOW  3. Call your doctor NOW  4. If you are still in the Red Zone after 20 minutes and you have not reached your doctor:    Take your rescue medicine again and    Call 911 or go to the emergency room right away    See your regular doctor within 2 weeks of an  Emergency Room or Urgent Care visit for follow-up treatment.        Electronically signed by: Sue Perez, January 16, 2018    Annual Reminders:  Meet with Asthma Educator,  Flu Shot in the Fall, consider Pneumonia Vaccination for patients with asthma (aged 19 and older).    Pharmacy:    MAIL ORDER - NO PHONE  PH[facility]AC[facility] - [facility] [place] [facility] [place] - [id number]                    Asthma Triggers  How To Control Things That Make Your Asthma Worse    Triggers are things that make your asthma worse.  Look at the list below to help you find your triggers and what you can do about them.  You can help prevent asthma flare-ups by staying away from your triggers.      Trigger                                                          What you can do   Cigarette Smoke  Tobacco smoke can make asthma worse. Do not allow smoking in your home, car or around you.  Be sure no one smokes at a child s day care or school.  If you smoke, ask your health care provider for ways to help you quit.  Ask family members to quit too.  Ask your health care provider for a referral to Quit Plan to help you quit smoking, or call 3-733-447-PLAN.     Colds, Flu, Bronchitis  These are common triggers of asthma. Wash your hands often.  Don t touch your eyes, nose or mouth.  Get a flu shot every year.     Dust Mites  These are tiny bugs that live in cloth or carpet. They are too small to see. Wash sheets and blankets in hot water every week.   Encase pillows and mattress in dust mite proof covers.  Avoid having carpet if you can. If you have carpet, vacuum weekly.   Use a dust mask and HEPA vacuum.   Pollen and Outdoor Mold  Some people are allergic to trees, grass, or weed pollen, or molds. Try to keep your windows closed.  Limit time out doors when pollen count is high.   Ask you health care provider about taking medicine during allergy season.     Animal Dander  Some people are allergic to skin  flakes, urine or saliva from pets with fur or feathers. Keep pets with fur or feathers out of your home.    If you can t keep the pet outdoors, then keep the pet out of your bedroom.  Keep the bedroom door closed.  Keep pets off cloth furniture and away from stuffed toys.     Mice, Rats, and Cockroaches  Some people are allergic to the waste from these pests.   Cover food and garbage.  Clean up spills and food crumbs.  Store grease in the refrigerator.   Keep food out of the bedroom.   Indoor Mold  This can be a trigger if your home has high moisture. Fix leaking faucets, pipes, or other sources of water.   Clean moldy surfaces.  Dehumidify basement if it is damp and smelly.   Smoke, Strong Odors, and Sprays  These can reduce air quality. Stay away from strong odors and sprays, such as perfume, powder, hair spray, paints, smoke incense, paint, cleaning products, candles and new carpet.   Exercise or Sports  Some people with asthma have this trigger. Be active!  Ask your doctor about taking medicine before sports or exercise to prevent symptoms.    Warm up for 5-10 minutes before and after sports or exercise.     Other Triggers of Asthma  Cold air:  Cover your nose and mouth with a scarf.  Sometimes laughing or crying can be a trigger.  Some medicines and food can trigger asthma.

## 2018-01-16 NOTE — MR AVS SNAPSHOT
After Visit Summary   1/16/2018    Divina Delgadillo    MRN: [id number]           Patient Information     Date Of Birth          1985        Visit Information        Provider Department      1/16/2018 2:20 PM Sue Perez APRN [facility]        Today's Diagnoses     Acute suppurative otitis media of both ears without spontaneous rupture of tympanic membranes, recurrence not specified    -  1    Acute maxillary sinusitis, recurrence not specified        Mild intermittent asthma with acute exacerbation          Care Instructions      Try a neti pot 2-3 times per day to help flush out the mucus.    Sinusitis (Antibiotic Treatment)    The sinuses are air-filled spaces within the bones of the face. They connect to the inside of the nose. Sinusitis is an inflammation of the tissue lining the sinus cavity. Sinus inflammation can occur during a cold. It can also be due to allergies to pollens and other particles in the air. Sinusitis can cause symptoms of sinus congestion and fullness. A sinus infection causes fever, headache and facial pain. There is often green or yellow drainage from the nose or into the back of the throat (post-nasal drip). You have been given antibiotics to treat this condition.  Home care:    Take the full course of antibiotics as instructed. Do not stop taking them, even if you feel better.    Drink plenty of water, hot tea, and other liquids. This may help thin mucus. It also may promote sinus drainage.    Heat may help soothe painful areas of the face. Use a towel soaked in hot water. Or,  the shower and direct the hot spray onto your face. Using a vaporizer along with a menthol rub at night may also help.     An expectorant containing guaifenesin may help thin the mucus and promote drainage from the sinuses.    Over-the-counter decongestants may be used unless a similar medicine was prescribed. Nasal sprays work the fastest. Use one that  contains phenylephrine or oxymetazoline. First blow the nose gently. Then use the spray. Do not use these medicines more often than directed on the label or symptoms may get worse. You may also use tablets containing pseudoephedrine. Avoid products that combine ingredients, because side effects may be increased. Read labels. You can also ask the pharmacist for help. (NOTE: Persons with high blood pressure should not use decongestants. They can raise blood pressure.)    Over-the-counter antihistamines may help if allergies contributed to your sinusitis.      Do not use nasal rinses or irrigation during an acute sinus infection, unless told to by your health care provider. Rinsing may spread the infection to other sinuses.    Use acetaminophen or ibuprofen to control pain, unless another pain medicine was prescribed. (If you have chronic liver or kidney disease or ever had a stomach ulcer, talk with your doctor before using these medicines. Aspirin should never be used in anyone under 18 years of age who is ill with a fever. It may cause severe liver damage.)    Don't smoke. This can worsen symptoms.  Follow-up care  Follow up with your healthcare provider or our staff if you are not improving within the next week.  When to seek medical advice  Call your healthcare provider if any of these occur:    Facial pain or headache becoming more severe    Stiff neck    Unusual drowsiness or confusion    Swelling of the forehead or eyelids    Vision problems, including blurred or double vision    Fever of 100.4 F (38 C) or higher, or as directed by your healthcare provider    Seizure    Breathing problems    Symptoms not resolving within 10 days  Date Last Reviewed: 4/13/2015 2000-2017 [employer]. [address] [place] [address], [place] [address]. All rights reserved. This information is not intended as a substitute for professional medical care. Always follow your healthcare professional's instructions.        Otitis  Media (Middle-Ear Infection) in Adults  Otitis media is another name for a middle-ear infection. It means an infection behind your eardrum. This kind of ear infection can happen after any condition that keeps fluid from draining from the middle ear. These conditions include allergies, a cold, a sore throat, or a respiratory infection.  Middle-ear infections are common in children, but they can also happen in adults. An ear infection in an adult may mean a more serious problem than in a child. So you may need additional tests. If you have an ear infection, you should see your health care provider for treatment.  What are the types of middle-ear infections?  Infections can affect the middle ear in several ways. They are:    Acute otitis media. This middle-ear infection occurs suddenly. It causes swelling and redness. Fluid and mucus become trapped inside the ear. You can have a fever and ear pain.    Otitis media with effusion. Fluid (effusion) and mucus build up in the middle ear after the infection goes away. You may feel like your middle ear is full. This can continue for months and may affect your hearing.    Chronic otitis media with effusion. Fluid (effusion) remains in the middle ear for a long time. Or it builds up again and again, even though there is no infection. This type of middle-ear infection may be hard to treat. It may also affect your hearing.  Who is more likely to get a middle-ear infection?  You are more likely to get an ear infection if you:    Smoke or are around someone who smokes    Have seasonal or year-round allergy symptoms    Have a cold or other upper respiratory infection  What causes a middle-ear infection?  The middle ear connects to the throat by a canal called the eustachian tube. This tube helps even out the pressure between the outer ear and the inner ear. A cold or allergy can irritate the tube or cause the area around it to swell. This can keep fluid from draining from the middle  ear. The fluid builds up behind the eardrum. Bacteria and viruses can grow in this fluid. The bacteria and viruses cause the middle-ear infection.  What are the symptoms of a middle-ear infection?  Common symptoms of a middle-ear infection in adults are:    Pain in 1 or both ears    Drainage from the ear    Muffled hearing    Sore throat   You may also have a fever. Rarely, your balance can be affected.  These symptoms may be the same as for other conditions. It s important to talk with your health care provider if you think you have a middle-ear infection. If you have a high fever, severe pain behind your ear, or paralysis in your face, see your provider as soon as you can.  How is a middle-ear infection diagnosed?  Your health care provider will take a medical history and do a physical exam. He or she will look at the outer ear and eardrum with an otoscope. The otoscope is a lighted tool that lets your provider see inside the ear. A pneumatic otoscope blows a puff of air into the ear to check how well your eardrum moves. If you eardrum doesn t move well, it may mean you have fluid behind it.  Your provider may also do a test called tympanometry. This test tells how well the middle ear is working. It can find any changes in pressure in the middle ear. Your provider may test your hearing with a tuning fork.  How is a middle-ear infection treated?  A middle-ear infection may be treated with:    Antibiotics, taken by mouth or as ear drops    Medication for pain    Decongestants, antihistamines, or nasal steroids  Your health care provider may also have you try autoinsufflation. This helps adjust the air pressure in your ear. For this, you pinch your nose and gently exhale. This forces air back through the eustachian tube.  The exact treatment for your ear infection will depend on the type of infection you have. In general, if your symptoms don t get better in 48 to 72 hours, contact your health care  provider.  Middle-ear infections can cause long-term problems if not treated. They can lead to:    Infection in other parts of the head    Permanent hearing loss    Paralysis of a nerve in your face  If you have a middle-ear infection that doesn t get better, you may need to see an ear, nose, and throat specialist (otolaryngologist). You may need a CT scan or MRI to check for head and neck cancer.  Ear tubes  Sometimes fluid stays in the middle ear even after you take antibiotics and the infection goes away. In this case, your health care provider may suggest that a small tube be placed in your ear. The tube is put at the opening of the eardrum. The tube keeps fluid from building up and relieves pressure in the middle ear. It can also help you hear better. This surgery is called myringotomy. It is not often done in adults.  The tubes usually fall out on their own after 6 months to a year.    5899-7948 [employer]. [address] [place] [address]. All rights reserved. This information is not intended as a substitute for professional medical care. Always follow your healthcare professional's instructions.                Follow-ups after your visit        Your next 10 appointments already scheduled     Jan 29, 2018  3:00 PM CST   Diabetic Education with [place] [facility]   [facility] ([facility])    [address] [place] [id number]   [phone]            Jun 28, 2018 11:20 AM CDT   LAB with LAB FIRST FLOOR [facility] ([facility])    [id number] [place] [id number] [phone]           Please do not eat 10-12 hours before your appointment if you are coming in fasting for labs on lipids, cholesterol, or glucose (sugar). This does not apply to pregnant women. Water, hot tea and black coffee (with nothing added) are okay. Do not drink other fluids, diet soda or chew gum.             "Jun 28, 2018 11:40 AM CDT   Return Visit with Jeremiah Perdomo MD   [facility] ([facility]    [address] [place] [id number]   [phone]            Jul 10, 2018  9:30 AM CDT   LAB with LAB FIRST FLOOR [facility] ([facility])    [address] [place] [id number]   [phone]           Please do not eat 10-12 hours before your appointment if you are coming in fasting for labs on lipids, cholesterol, or glucose (sugar). This does not apply to pregnant women. Water, hot tea and black coffee (with nothing added) are okay. Do not drink other fluids, diet soda or chew gum.            Jul 10, 2018 10:00 AM CDT   Return Visit with Sánchez Dias MD   [facility] ([facility])    [address] [place] [id number]   [phone]              Who to contact     If you have questions or need follow up information about today's clinic visit or your schedule please contact [facility] directly at [phone].  Normal or non-critical lab and imaging results will be communicated to you by MyChart, letter or phone within 4 business days after the clinic has received the results. If you do not hear from us within 7 days, please contact the clinic through [employer] or phone. If you have a critical or abnormal lab result, we will notify you by phone as soon as possible.  Submit refill requests through [employer] or call your pharmacy and they will forward the refill request to us. Please allow 3 business days for your refill to be completed.          Additional Information About Your Visit        [employer] Information     [employer] lets you send messages to your doctor, view your test results, renew your prescriptions, schedule appointments and more. To sign up, go to [email or web address] . Click on \"Log in\" on the left side of the screen, which will take you " "to the Welcome page. Then click on \"Sign up Now\" on the right side of the page.     You will be asked to enter the access code listed below, as well as some personal information. Please follow the directions to create your username and password.     Your access code is: [email or web address]  Expires: 3/7/2018  9:45 AM     Your access code will  in 90 days. If you need help or a new code, please call your [facility] clinic or [phone].        Care EveryWhere ID     This is your Care EveryWhere ID. This could be used by other organizations to access your [facility] medical records  [id number]        Your Vitals Were     Pulse Temperature Respirations Height Pulse Oximetry BMI (Body Mass Index)    106 97.8  F (36.6  C) (Tympanic) 16 5' 4.5\" (1.638 m) 98% 33.46 kg/m2       Blood Pressure from Last 3 Encounters:   18 130/70   18 151/73   18 130/62    Weight from Last 3 Encounters:   18 198 lb (89.8 kg)   18 198 lb 3.2 oz (89.9 kg)   18 196 lb 11.2 oz (89.2 kg)              Today, you had the following     No orders found for display         Today's Medication Changes          These changes are accurate as of: 18  2:49 PM.  If you have any questions, ask your nurse or doctor.               Start taking these medicines.        Dose/Directions    albuterol 108 (90 BASE) MCG/ACT Inhaler   Commonly known as:  PROAIR HFA/PROVENTIL HFA/VENTOLIN HFA   Used for:  Mild intermittent asthma with acute exacerbation   Started by:  Sue Perez APRN CNP        Dose:  2 puff   Inhale 2 puffs into the lungs every 6 hours as needed for shortness of breath / dyspnea or wheezing   Quantity:  1 Inhaler   Refills:  3       amoxicillin 875 MG tablet   Commonly known as:  AMOXIL   Used for:  Acute suppurative otitis media of both ears without spontaneous rupture of tympanic membranes, recurrence not specified, Acute maxillary sinusitis, recurrence not specified   Started by:  Sue Perez " VERONIQUE Poe CNP        Dose:  875 mg   Take 1 tablet (875 mg) by mouth 2 times daily for 10 days   Quantity:  20 tablet   Refills:  0            Where to get your medicines      These medications were sent to [place] [facility] - [place] - [id number]  [address], [place] [address]    H[facility]:  [facility] [place] Phone:  [phone]     albuterol 108 (90 BASE) MCG/ACT Inhaler    amoxicillin 875 MG tablet                Primary Care Provider Office Phone # Fax #    Jaleesa Velasquez, VERONIQUE GRAYSON [phone] [address] [facility] [address]        Equal Access to Services     AARON HAY : Elsa hugoo Sotrace, waaxda luqadaha, qaybta kaalmada adeegyada, aaron echeverria . So [facility] [phone].    ATENCIÓN: Si habla español, tiene a gil disposición servicios gratuitos de asistencia lingüística. [facility] [phone].    We comply with applicable federal civil rights laws and [place] laws. We do not discriminate on the basis of race, color, national origin, age, disability, sex, sexual orientation, or gender identity.            Thank you!     Thank you for choosing [facility]  for your care. Our goal is always to provide you with excellent care. Hearing back from our patients is one way we can continue to improve our services. Please take a few minutes to complete the written survey that you may receive in the mail after your visit with us. Thank you!             Your Updated Medication List - Protect others around you: Learn how to safely use, store and throw away your medicines at www.disposemymeds.org.          This list is accurate as of: 1/16/18  2:49 PM.  Always use your most recent med list.                   Brand Name Dispense Instructions for use Diagnosis    ABILIFY 30 MG tablet   Generic drug:  ARIPiprazole      Take 30 mg by mouth daily        albuterol 108 (90 BASE) MCG/ACT Inhaler     PROAIR HFA/PROVENTIL HFA/VENTOLIN HFA    1 Inhaler    Inhale 2 puffs into the lungs every 6 hours as needed for shortness of breath / dyspnea or wheezing    Mild intermittent asthma with acute exacerbation       amLODIPine 5 MG tablet    NORVASC    90 tablet    Take 1 tablet (5 mg) by mouth daily    Essential hypertension       amoxicillin 875 MG tablet    AMOXIL    20 tablet    Take 1 tablet (875 mg) by mouth 2 times daily for 10 days    Acute suppurative otitis media of both ears without spontaneous rupture of tympanic membranes, recurrence not specified, Acute maxillary sinusitis, recurrence not specified       blood glucose lancets standard    no brand specified    1    needs lancet     Type II or unspecified type diabetes mellitus without mention of complication, not stated as uncontrolled, Mild persistent asthma       blood glucose monitoring test strip    no brand specified    100 each    1 strip by In Vitro route 3 times daily    Type 2 diabetes mellitus with stage 3 chronic kidney disease, without long-term current use of insulin (H)       Clozapine 200 MG tablet    CLOZARIL     Take 200 mg by mouth 2 times daily        dulaglutide 1.5 MG/0.5ML pen    TRULICITY    6 mL    Inject 1.5 mg Subcutaneous every 7 days    Type 2 diabetes mellitus with stage 3 chronic kidney disease, without long-term current use of insulin (H)       FLUoxetine 20 MG capsule    PROzac     Take 20 mg by mouth daily        glipiZIDE 10 MG tablet    GLUCOTROL    60 tablet    Take 1 tablet (10 mg) by mouth 2 times daily (before meals)    Type 2 diabetes mellitus with stage 3 chronic kidney disease, without long-term current use of insulin (H)       insulin glargine 100 UNIT/ML injection    LANTUS SOLOSTAR    3 mL    Inject 19 units daily morning, increase by 2 units every 4 days until fasting BG less then 140 consistently    Type 2 diabetes mellitus with stage 3 chronic kidney disease, without long-term current use of  insulin (H)       lisinopril 10 MG tablet    PRINIVIL/ZESTRIL    90 tablet    Take 1 tablet (10 mg) by mouth daily    Essential hypertension       montelukast 10 MG tablet    SINGULAIR    90 tablet    Take 1 tablet (10 mg) by mouth At Bedtime        omeprazole 20 MG CR capsule    priLOSEC    60 capsule    Take 1 capsule (20 mg) by mouth daily 1 capsule bid    Gastroesophageal reflux disease without esophagitis       ranitidine 300 MG tablet    ZANTAC    90 tablet    Take 1 tablet (300 mg) by mouth At Bedtime    Gastroesophageal reflux disease without esophagitis

## 2018-01-17 ENCOUNTER — TELEPHONE (OUTPATIENT)
Dept: NEPHROLOGY | Facility: CLINIC | Age: 33
End: 2018-01-17

## 2018-01-17 DIAGNOSIS — I10 ESSENTIAL HYPERTENSION: Primary | ICD-10-CM

## 2018-01-17 ASSESSMENT — ASTHMA QUESTIONNAIRES: ACT_TOTALSCORE: 25

## 2018-01-17 NOTE — TELEPHONE ENCOUNTER
Divina was seen by Dr. Dias on 1/9/18. Office visit instructions were as follows:    Patient Instructions   1. Stop the chlorthalidone  2. Start lisinopril 10 mg daily  3. Repeat labs in 1 week in [facility]  4. Follow blood pressure at home and please update on those readings in 1 week when you go for lab ([phone] Shelley)  5. Stop the iron    Dr. Dias office note 1/9/18:  5. Hypertension: blood pressure is above goal today. I would like to get her back on lisinopril for renoprotective effects given presumed diabetic nephropathy. I am going to start 10 mg of lisinopril and DC chlorthalidone. We will follow-up labs in a week. If stable, will continue to titrate the lisinopril as able - ideally would wean off norvasc as well and be on single agent lisinopril going forward.     BP from PCP office visit yesterday:   Vital Signs 1/16/2018   Systolic 130   Diastolic 70   Pulse 106   Message left for Lorin, patient's caretaker for additional BP readings and status update since the medication adjustment. She is also due for a lab recheck ([facility]), will offer for this to be done at her appointment on 1/29.    Shelley Vail, RN, BSN  Nephrology Care Coordinator  [facility]

## 2018-01-19 ENCOUNTER — OFFICE VISIT (OUTPATIENT)
Dept: FAMILY MEDICINE | Facility: CLINIC | Age: 33
End: 2018-01-19
Payer: MEDICARE

## 2018-01-19 ENCOUNTER — TELEPHONE (OUTPATIENT)
Dept: NEPHROLOGY | Facility: CLINIC | Age: 33
End: 2018-01-19

## 2018-01-19 VITALS
TEMPERATURE: 98.3 F | HEART RATE: 100 BPM | DIASTOLIC BLOOD PRESSURE: 72 MMHG | SYSTOLIC BLOOD PRESSURE: 128 MMHG | WEIGHT: 191 LBS | BODY MASS INDEX: 31.82 KG/M2 | HEIGHT: 65 IN

## 2018-01-19 DIAGNOSIS — I10 ESSENTIAL HYPERTENSION: ICD-10-CM

## 2018-01-19 DIAGNOSIS — E11.22 TYPE 2 DIABETES MELLITUS WITH STAGE 3 CHRONIC KIDNEY DISEASE, WITHOUT LONG-TERM CURRENT USE OF INSULIN (H): Primary | ICD-10-CM

## 2018-01-19 DIAGNOSIS — N18.30 TYPE 2 DIABETES MELLITUS WITH STAGE 3 CHRONIC KIDNEY DISEASE, WITHOUT LONG-TERM CURRENT USE OF INSULIN (H): Primary | ICD-10-CM

## 2018-01-19 LAB
ANION GAP SERPL CALCULATED.3IONS-SCNC: 10 MMOL/L (ref 3–14)
BASOPHILS # BLD AUTO: 0.1 10E9/L (ref 0–0.2)
BASOPHILS NFR BLD AUTO: 0.7 %
BUN SERPL-MCNC: 44 MG/DL (ref 7–30)
CALCIUM SERPL-MCNC: 9.6 MG/DL (ref 8.5–10.1)
CHLORIDE SERPL-SCNC: 107 MMOL/L (ref 94–109)
CO2 SERPL-SCNC: 22 MMOL/L (ref 20–32)
CREAT SERPL-MCNC: 1.9 MG/DL (ref 0.52–1.04)
DIFFERENTIAL METHOD BLD: ABNORMAL
EOSINOPHIL # BLD AUTO: 0.3 10E9/L (ref 0–0.7)
EOSINOPHIL NFR BLD AUTO: 1.7 %
ERYTHROCYTE [DISTWIDTH] IN BLOOD BY AUTOMATED COUNT: 14.5 % (ref 10–15)
GFR SERPL CREATININE-BSD FRML MDRD: 31 ML/MIN/1.7M2
GLUCOSE SERPL-MCNC: 193 MG/DL (ref 70–99)
HBA1C MFR BLD: 10 % (ref 4.3–6)
HCT VFR BLD AUTO: 42.6 % (ref 35–47)
HGB BLD-MCNC: 13.7 G/DL (ref 11.7–15.7)
HOWELL-JOLLY BOD BLD QL SMEAR: PRESENT
IMM GRANULOCYTES # BLD: 0.3 10E9/L (ref 0–0.4)
IMM GRANULOCYTES NFR BLD: 2.3 %
LYMPHOCYTES # BLD AUTO: 6.2 10E9/L (ref 0.8–5.3)
LYMPHOCYTES NFR BLD AUTO: 41.6 %
MCH RBC QN AUTO: 29.9 PG (ref 26.5–33)
MCHC RBC AUTO-ENTMCNC: 32.2 G/DL (ref 31.5–36.5)
MCV RBC AUTO: 93 FL (ref 78–100)
MONOCYTES # BLD AUTO: 1 10E9/L (ref 0–1.3)
MONOCYTES NFR BLD AUTO: 6.8 %
NEUTROPHILS # BLD AUTO: 7.1 10E9/L (ref 1.6–8.3)
NEUTROPHILS NFR BLD AUTO: 46.9 %
PLATELET # BLD AUTO: 392 10E9/L (ref 150–450)
PLATELET # BLD EST: ABNORMAL 10*3/UL
POTASSIUM SERPL-SCNC: 5.1 MMOL/L (ref 3.4–5.3)
RBC # BLD AUTO: 4.58 10E12/L (ref 3.8–5.2)
RBC MORPH BLD: NORMAL
SODIUM SERPL-SCNC: 139 MMOL/L (ref 133–144)
VARIANT LYMPHS BLD QL SMEAR: PRESENT
WBC # BLD AUTO: 15 10E9/L (ref 4–11)

## 2018-01-19 PROCEDURE — 85025 COMPLETE CBC W/AUTO DIFF WBC: CPT | Performed by: NURSE PRACTITIONER

## 2018-01-19 PROCEDURE — 36415 COLL VENOUS BLD VENIPUNCTURE: CPT | Performed by: NURSE PRACTITIONER

## 2018-01-19 PROCEDURE — 99214 OFFICE O/P EST MOD 30 MIN: CPT | Performed by: NURSE PRACTITIONER

## 2018-01-19 PROCEDURE — 83036 HEMOGLOBIN GLYCOSYLATED A1C: CPT | Performed by: NURSE PRACTITIONER

## 2018-01-19 PROCEDURE — 80048 BASIC METABOLIC PNL TOTAL CA: CPT | Performed by: INTERNAL MEDICINE

## 2018-01-19 NOTE — NURSING NOTE
"Chief Complaint   Patient presents with     Diabetes     Follow up      Hypertension     Follow up        Initial /72  Pulse 100  Temp 98.3  F (36.8  C) (Tympanic)  Ht 5' 4.5\" (1.638 m)  Wt 191 lb (86.6 kg)  BMI 32.28 kg/m2 Estimated body mass index is 32.28 kg/(m^2) as calculated from the following:    Height as of this encounter: 5' 4.5\" (1.638 m).    Weight as of this encounter: 191 lb (86.6 kg).  Medication Reconciliation: complete  "

## 2018-01-19 NOTE — PROGRESS NOTES
SUBJECTIVE:   Divina Delgadillo is a 32 year old female who presents to clinic today for the following health issues:  The patient states that her blood sugars ranging from 136-200', had 300 on Tuesday after she ate peanut butter. The patient is going to meet with Katiana diabetic educator next week.   Divina continue to work on loosing weight and she lost 8 lbs over the last month. Complains of some numbness and tingling in her feet, states it is on and off, states it does not really bother her.  Blood pressure is well controlled.     Diabetes Follow-up    Patient is checking blood sugars: three times daily.   Results are as follows:       am - 140 this morning  Yesterday 136, still having some high readings 300's on Tuesday     Diabetic concerns: None     Symptoms of hypoglycemia (low blood sugar): none     Paresthesias (numbness or burning in feet) or sores: Yes numbness in her feet is a new thing for her      Date of last diabetic eye exam: one year ago     Hypertension Follow-up      Outpatient blood pressures are being checked at home.  Results are 113/76, 126/75    Low Salt Diet: no added salt  BP Readings from Last 2 Encounters:   01/19/18 128/72   01/16/18 130/70     Hemoglobin A1C (%)   Date Value   01/19/2018 10.0 (H)   12/07/2017 9.6 (H)     LDL Cholesterol Calculated (mg/dL)   Date Value   06/15/2017 192 (H)   03/12/2009 80         Amount of exercise or physical activity: 1 day/week for an average of 30-45 minutes    Problems taking medications regularly: No    Medication side effects: none    Diet: regular (no restrictions)    Problem list and histories reviewed & adjusted, as indicated.  Additional history: as documented    Labs reviewed in EPIC    Reviewed and updated as needed this visit by clinical staffTobacco  Allergies  Med Hx  Surg Hx  Fam Hx  Soc Hx      Reviewed and updated as needed this visit by Provider         ROS:  Constitutional, HEENT, cardiovascular, pulmonary, gi and gu systems  "are negative, except as otherwise noted.      OBJECTIVE:   /72  Pulse 100  Temp 98.3  F (36.8  C) (Tympanic)  Ht 5' 4.5\" (1.638 m)  Wt 191 lb (86.6 kg)  BMI 32.28 kg/m2  Body mass index is 32.28 kg/(m^2).  GENERAL: healthy, alert and no distress  HENT: normal cephalic/atraumatic and both ears: erythematous and red and boggy canal  CV: regular rate and rhythm, normal S1 S2, no S3 or S4, no murmur, click or rub, no peripheral edema and peripheral pulses strong  MS: no gross musculoskeletal defects noted, no edema  PSYCH: mentation appears normal, affect normal/bright  Diabetic foot exam: normal DP and PT pulses, no trophic changes or ulcerative lesions and normal sensory exam    Diagnostic Test Results:  Lab Results   Component Value Date    A1C 10.0 01/19/2018    A1C 9.6 12/07/2017    A1C 7.7 09/07/2017    A1C 7.8 06/15/2017    A1C 6.6 02/27/2017       ASSESSMENT/PLAN:     1. Type 2 diabetes mellitus with stage 3 chronic kidney disease, without long-term current use of insulin (H)  -uncontrolled   -she increased Lantus to 27 units daily, will not increase further since her morning BG's improved and ranging from 136-140'  -she is still not following diabetic diet, she still eats pasta, bread and her favorite peanut butter, per her care giver she occasionally eats several full table spoons of peanut butter with bread and this is her frequent meal of choice. I highly recommended Divina to avoid high card meals, she will talk to Katiana about it next week  -just started Trulicity, will continue 1.5 mg weekly  -continue Glipizide 10 mg twice daily   -will consider starting Novolog SSI  -will also consider adding DPP-4 inhibitor Januvia 50 mg daily (lower dose due to CKD),  I would like to see Katiana recommendations before I make any changes.   - CBC with platelets and differential  - Hemoglobin A1c  -follow up in 3 weeks     2. Essential hypertension  -well controlled  -continue current regimen  - Basic metabolic " panel    See Patient Instructions    VERONIQUE Spears [facility]

## 2018-01-19 NOTE — MR AVS SNAPSHOT
After Visit Summary   1/19/2018    Divina Delgadillo    MRN: [id number]           Patient Information     Date Of Birth          1985        Visit Information        Provider Department      1/19/2018 9:20 AM Jaleesa Velasquez APRN [facility]        Today's Diagnoses     Type 2 diabetes mellitus with stage 3 chronic kidney disease, without long-term current use of insulin (H)    -  1    Essential hypertension        Medication management          Care Instructions    BP under control  Stop eating brand and past  Will continue Lantus 27 units daily, do not increase more  Continue Trulicity  Continue Glipizide 10 mg twice daily   Follow up with diabetic educator               Follow-ups after your visit        Your next 10 appointments already scheduled     Jan 29, 2018  3:00 PM CST   Diabetic Education with [place] [facility]   [facility] [place] ([facility])    [address] [address]   [phone]            Jun 28, 2018 11:20 AM CDT   LAB with LAB FIRST FLOOR [facility])    [address] [address] [place] [id number] [phone]           Please do not eat 10-12 hours before your appointment if you are coming in fasting for labs on lipids, cholesterol, or glucose (sugar). This does not apply to pregnant women. Water, hot tea and black coffee (with nothing added) are okay. Do not drink other fluids, diet soda or chew gum.            Jun 28, 2018 11:40 AM CDT   Return Visit with Jeremiah Perdomo MD   [facility])    [address] [facility] [address]   [phone]            Jul 10, 2018  9:30 AM CDT   LAB with LAB FIRST FLOOR [facility])    [address] [facility] [id number] [phone]           Please do not eat 10-12 hours before  "your appointment if you are coming in fasting for labs on lipids, cholesterol, or glucose (sugar). This does not apply to pregnant women. Water, hot tea and black coffee (with nothing added) are okay. Do not drink other fluids, diet soda or chew gum.            Jul 10, 2018 10:00 AM CDT   Return Visit with Sánchez Dias MD   [facility]    [address] [place] [id number] [phone]              Who to contact     If you have questions or need follow up information about today's clinic visit or your schedule please contact [facility] directly at [phone].  Normal or non-critical lab and imaging results will be communicated to you by MyChart, letter or phone within 4 business days after the clinic has received the results. If you do not hear from us within 7 days, please contact the clinic through [employer] or phone. If you have a critical or abnormal lab result, we will notify you by phone as soon as possible.  Submit refill requests through [employer] or call your pharmacy and they will forward the refill request to us. Please allow 3 business days for your refill to be completed.          Additional Information About Your Visit        [employer] Information     [employer] lets you send messages to your doctor, view your test results, renew your prescriptions, schedule appointments and more. To sign up, go to [email or web address] . Click on \"Log in\" on the left side of the screen, which will take you to the Welcome page. Then click on \"Sign up Now\" on the right side of the page.     You will be asked to enter the access code listed below, as well as some personal information. Please follow the directions to create your username and password.     Your access code is: [email or web address]  Expires: 3/7/2018  9:45 AM     Your access code will  in 90 days. If you need help or a new code, please call your [facility] or [phone].   " "     Care EveryWhere ID     This is your Care EveryWhere ID. This could be used by other organizations to access your [facility] medical records  [id number]        Your Vitals Were     Pulse Temperature Height BMI (Body Mass Index)          100 98.3  F (36.8  C) (Tympanic) 5' 4.5\" (1.638 m) 32.28 kg/m2         Blood Pressure from Last 3 Encounters:   01/19/18 128/72   01/16/18 130/70   01/09/18 151/73    Weight from Last 3 Encounters:   01/19/18 191 lb (86.6 kg)   01/16/18 198 lb (89.8 kg)   01/09/18 198 lb 3.2 oz (89.9 kg)              We Performed the Following     Basic metabolic panel     CBC with platelets and differential     Hemoglobin A1c        Primary Care Provider Office Phone # Fax #    Jaleesa Velasquez, APRN [phone] [address] [facility] [address]        Equal Access to Services     FANG HAY : Hadii aad ku hadasho Soomaali, waaxda luqadaha, qaybta kaalmada adeegyada, waxay idiin haymartyn neha cheathamarabambi laadilene . So [facility] [phone].    ATENCIÓN: Si habla español, tiene a gil disposición servicios gratuitos de asistencia lingüística. Llame al [phone].    We comply with applicable federal civil rights laws and [place] laws. We do not discriminate on the basis of race, color, national origin, age, disability, sex, sexual orientation, or gender identity.            Thank you!     Thank you for choosing [facility]  for your care. Our goal is always to provide you with excellent care. Hearing back from our patients is one way we can continue to improve our services. Please take a few minutes to complete the written survey that you may receive in the mail after your visit with us. Thank you!             Your Updated Medication List - Protect others around you: Learn how to safely use, store and throw away your medicines at www.disposemymeds.org.          This list is accurate as of: 1/19/18  9:54 AM.  Always use your most recent med list.             "       Brand Name Dispense Instructions for use Diagnosis    ABILIFY 30 MG tablet   Generic drug:  ARIPiprazole      Take 30 mg by mouth daily        albuterol 108 (90 BASE) MCG/ACT Inhaler    PROAIR HFA/PROVENTIL HFA/VENTOLIN HFA    1 Inhaler    Inhale 2 puffs into the lungs every 6 hours as needed for shortness of breath / dyspnea or wheezing    Mild intermittent asthma with acute exacerbation       amLODIPine 5 MG tablet    NORVASC    90 tablet    Take 1 tablet (5 mg) by mouth daily    Essential hypertension       amoxicillin 875 MG tablet    AMOXIL    20 tablet    Take 1 tablet (875 mg) by mouth 2 times daily for 10 days    Acute suppurative otitis media of both ears without spontaneous rupture of tympanic membranes, recurrence not specified, Acute maxillary sinusitis, recurrence not specified       blood glucose lancets standard    no brand specified    1    needs lancet     Type II or unspecified type diabetes mellitus without mention of complication, not stated as uncontrolled, Mild persistent asthma       blood glucose monitoring test strip    no brand specified    100 each    1 strip by In Vitro route 3 times daily    Type 2 diabetes mellitus with stage 3 chronic kidney disease, without long-term current use of insulin (H)       Clozapine 200 MG tablet    CLOZARIL     Take 200 mg by mouth 2 times daily        dulaglutide 1.5 MG/0.5ML pen    TRULICITY    6 mL    Inject 1.5 mg Subcutaneous every 7 days    Type 2 diabetes mellitus with stage 3 chronic kidney disease, without long-term current use of insulin (H)       FLUoxetine 20 MG capsule    PROzac     Take 20 mg by mouth daily        glipiZIDE 10 MG tablet    GLUCOTROL    60 tablet    Take 1 tablet (10 mg) by mouth 2 times daily (before meals)    Type 2 diabetes mellitus with stage 3 chronic kidney disease, without long-term current use of insulin (H)       insulin glargine 100 UNIT/ML injection    LANTUS SOLOSTAR    3 mL    Inject 19 units  daily morning, increase by 2 units every 4 days until fasting BG less then 140 consistently    Type 2 diabetes mellitus with stage 3 chronic kidney disease, without long-term current use of insulin (H)       lisinopril 10 MG tablet    PRINIVIL/ZESTRIL    90 tablet    Take 1 tablet (10 mg) by mouth daily    Essential hypertension       montelukast 10 MG tablet    SINGULAIR    90 tablet    Take 1 tablet (10 mg) by mouth At Bedtime        omeprazole 20 MG CR capsule    priLOSEC    60 capsule    Take 1 capsule (20 mg) by mouth daily 1 capsule bid    Gastroesophageal reflux disease without esophagitis       ranitidine 300 MG tablet    ZANTAC    90 tablet    Take 1 tablet (300 mg) by mouth At Bedtime    Gastroesophageal reflux disease without esophagitis

## 2018-01-19 NOTE — TELEPHONE ENCOUNTER
Reason for call:  Other   Patient called regarding (reason for call): call back  Additional comments: Patient returning call to Shelley BENAVIDES nurse for Damaris Pearce      Phone number to reach patient:  Cell number on file:    Telephone Information:   Mobile [phone]       Best Time:  any    Can we leave a detailed message on this number?  YES   Care team unavailable through [facility]

## 2018-01-19 NOTE — PATIENT INSTRUCTIONS
BP under control  Stop eating brand and pasta  Will continue Lantus 27 units daily, do not increase more  Continue Trulicity  Continue Glipizide 10 mg twice daily   Follow up with diabetic educator

## 2018-01-23 NOTE — TELEPHONE ENCOUNTER
Attempted to reach patient to discuss results. Caitlin pham.    Shelley Vail, RN, BSN  Nephrology Care Coordinator  [facility]

## 2018-01-23 NOTE — TELEPHONE ENCOUNTER
Attempted to reach patient. Line busy. Charting in a different encounter. Closing encounter.  Shelley Vail RN

## 2018-01-23 NOTE — TELEPHONE ENCOUNTER
Your kidney function test unfortunately was worse on Friday than where you were at before starting the lisinopril. I question whether you were slightly dehydrated at the time of the lab. Prior to making a change, I would like to start by just rechecking your kidney function when well hydrated.     Recommendations:   1. Hydrate   2. Repeat test   3. Please double check that you are taking 10 mg of lisinopril and that you are no longer on chlorthalidone. Please update if this is not the case.

## 2018-01-29 ENCOUNTER — ALLIED HEALTH/NURSE VISIT (OUTPATIENT)
Dept: EDUCATION SERVICES | Facility: CLINIC | Age: 33
End: 2018-01-29
Payer: MEDICARE

## 2018-01-29 DIAGNOSIS — E11.22 TYPE 2 DIABETES MELLITUS WITH STAGE 3 CHRONIC KIDNEY DISEASE, WITHOUT LONG-TERM CURRENT USE OF INSULIN (H): Primary | ICD-10-CM

## 2018-01-29 DIAGNOSIS — N18.30 TYPE 2 DIABETES MELLITUS WITH STAGE 3 CHRONIC KIDNEY DISEASE, WITHOUT LONG-TERM CURRENT USE OF INSULIN (H): Primary | ICD-10-CM

## 2018-01-29 PROCEDURE — G0108 DIAB MANAGE TRN  PER INDIV: HCPCS

## 2018-01-29 NOTE — Clinical Note
Ryan Lazcano,  I met with Divina and her foster parents.  We decreased the Glipizide to reduce risk of low blood sugars and are going to continue the insulin titration.  The Trulicity seems to have kicked in well and is working.  To minimize insulin use (hopefully reduce or discontinue eventually) we are going to keep working hard on diet,actitvity and weight loss.  She set a lot of starting goals for activity and diet changes.  I am meeting with them again in 3 weeks.  Thanks!  Katiana Riley RD, LD, CDE Diabetes Education

## 2018-01-29 NOTE — PATIENT INSTRUCTIONS
Dolly Riely RD, LD, CDE  For Diabetes Education appointments call: [phone]   For Diabetes Education Related Questions call: [phone] or email: [email or web address]

## 2018-01-29 NOTE — PROGRESS NOTES
Diabetes Self Management Training: Follow-up Visit    Divina Delgadillo presents today for evaluation of glucose control related to Type 2 diabetes.  She is accompanied by self, foster mother and foster father    Patient's diabetes management related comments/concerns: recently started on Trulicity and insulin with worsening A1c.  Patient would like this visit to be focused around the following diabetes-related behaviors and goals: Healthy Eating and Being Active    ASSESSMENT:  Patient Problem List reviewed for relevant medical history and current medical status.    Current Diabetes Management per Patient:  Taking diabetes medications?   yes:     Diabetes Medication(s)     Insulin Sig    insulin glargine (LANTUS SOLOSTAR) 100 UNIT/ML injection Inject 19 units daily morning, increase by 2 units every 4 days until fasting BG less then 140 consistently    Sulfonylureas Sig    glipiZIDE (GLUCOTROL) 10 MG tablet Take 1 tablet (10 mg) by mouth 2 times daily (before meals)    Incretin Mimetic Agents (GLP-1 Receptor Agonists) Sig    dulaglutide (TRULICITY) 1.5 MG/0.5ML pen Inject 1.5 mg Subcutaneous every 7 days      Lantus 0-0-0-27  Glipizde  5mg BID   Trulicity   Metformin - off due to liver     *Abbreviated insulin dose documentation key: Insulin Trade Name (uokibbtmz-rvxym-wiloed-bedtime) - i.e. Humalog 5-5-5-0 (Humalog 5 units at breakfast, 5 units at lunch, and 5 units at dinner).    Patient glucose self monitoring as follows: three times daily.   BG meter: did not bring meter  BG results:    Fastin, lowest 103, mostly 140s-170s, highest 195.   When home from work (3.25 hrs after lunch or 2 hours after lunch 137, 212, 176, 117, 129, 188  2 hours after dinner: 140s-200    BG values are: about 50% in target after meals and 25% in target fasting.   Patient's most recent is not meeting goal of <7.0  Lab Results   Component Value Date    A1C 10.0 2018    A1C 9.6 2017    A1C 7.7 2017    A1C 7.8  "06/15/2017    A1C 6.6 02/27/2017       Nutrition:  Patient eats 3 meals per day    Breakfast - sometimes doesn't eat before work OR oatmeal 1-2 packets with milk + banana   Lunch - 11am   Dinner - 5:30-6pm   Snacks - was keeping snacks in room.     Beverages: was sneaking juice, but is trying to cut out.  Water, milk     Cultural/Congregation diet restrictions: No   Biggest Challenge to Healthy Eating: evening snacking    Physical Activity:    Is supposed to be going to [employer] on Tuesday, Thursdays,   Has [employer] and has not been using.      Diabetes Complications:  Acute Complications: At risk for hypoglycemia? yes  Frequency of hypoglycemia: Has not had in a long time.  Lowest was 79mg/dL and that was a while ago.   Patient carries a carbohydrate source with them regularly: Encouraged to carry glucose tabs     Vitals:  Estimated body mass index is 32.28 kg/(m^2) as calculated from the following:    Height as of 1/19/18: 1.638 m (5' 4.5\").    Weight as of 1/19/18: 86.6 kg (191 lb).     Wt Readings from Last 5 Encounters:   01/19/18 86.6 kg (191 lb)   01/16/18 89.8 kg (198 lb)   01/09/18 89.9 kg (198 lb 3.2 oz)   01/03/18 89.2 kg (196 lb 11.2 oz)   12/14/17 90.7 kg (199 lb 14.4 oz)       Last 3 BP:   BP Readings from Last 3 Encounters:   01/19/18 128/72   01/16/18 130/70   01/09/18 151/73       History   Smoking Status     Current Every Day Smoker     Packs/day: 1.00     Years: 4.00     Types: Cigarettes   Smokeless Tobacco     Never Used     Comment: 1 packe every other day        Labs:  Lab Results   Component Value Date    A1C 10.0 01/19/2018     Lab Results   Component Value Date     01/19/2018     Lab Results   Component Value Date     06/15/2017     HDL Cholesterol   Date Value Ref Range Status   06/15/2017 72 >49 mg/dL Final   ]  GFR Estimate   Date Value Ref Range Status   01/19/2018 31 (L) >60 mL/min/1.7m2 Final     Comment:     Non  GFR Calc     GFR Estimate If Black "   Date Value Ref Range Status   01/19/2018 37 (L) >60 mL/min/1.7m2 Final     Comment:      GFR Calc     Lab Results   Component Value Date    CR 1.90 01/19/2018     No results found for: MICROALBUMIN    Health Beliefs and Attitudes:   Patient Activation Measure Survey Score:  No flowsheet data found.    Stage of Change: PREPARATION (Decided to change - considering how)    Diabetes knowledge and skills assessment:   Patient is knowledgeable in diabetes management concepts related to: Healthy Eating, Being Active, Monitoring and Taking Medication  Patient needs further education on the following diabetes management concepts: Healthy Eating, Being Active, Problem Solving and Reducing Risks  Barriers to Learning Assessment: No Barriers identified  Based on learning assessment above, most appropriate setting for further diabetes education would be: Individual setting.    INTERVENTION:  Patient cannot be on Metformin due to non alcoholic fatty liver disease.  Has been on Glipizide for years.  Her foster parents feel that evening snacking and decrease in exercise has led to the increase in A1c and patient agrees.  Reports a history a pancreas cancer and that half her pancreas was removed about 4 years ago, but that this did nto cause any changes in BG control.     Noticeable improvement in BGs after being on the Trulicity for a month per her foster mother.  Patient is tolerating it well.  Is titrating the insulin, but overall the patient and family want to minimize insulin use if Divina can increase activity and watch diet closer.  To reduce risk of low blood sugars recommended decreasing Glipizde from 5mg BID to 5mg QD and continue insulin titration up by 2 units every 4 days until 50% of the BGs are around 140mg/dl per PCPs instructions.  Seeing patient again in February so she will not be above 37 units by that time.  Max dose of 0.5 units/kg is 43 units.      Spent much time reviewing carb counting, meal  ideas, healthy snacks etc.   Also reviewed goals for activity and how this impacts blood sugars.  If having any low blood sugars discussed the need to update right away as then the other Glipizde could be discontinued if PCP in agreement and/or insulin reduced.      Education provided today on:  AADE Self-Care Behaviors:  Healthy Eating: carbohydrate counting, consistency in amount, composition, and timing of food intake, weight reduction, eating out, portion control, plate planning method and label reading  Being Active: relationship to blood glucose, describe appropriate activity program and precautions to take  Monitoring: individual blood glucose targets and frequency of monitoring  Taking Medication: action of prescribed medication, side effects of prescribed medications and when to take medications  Problem Solving: low blood glucose - causes, signs/symptoms, treatment and prevention and carrying a carbohydrate source at all times    Opportunities for ongoing education and support in diabetes-self management were discussed.  Pt verbalized understanding of concepts discussed and recommendations provided today.       Education Materials Provided:  BG Log Sheet and Carbohydrate Counting, Understanding Diabetes    PLAN:  Information written on handouts   Call in with any low blood sugars     FOLLOW-UP:  Follow-up appointment scheduled in 20 days.  Chart routed to referring provider.    Ongoing plan for education and support: Follow-up visit with diabetes educator in 3 weeks    Dolly Riley RD, LD, CDE  Diabetes Education    Time Spent: 70 minutes  Encounter Type: Individual    Any diabetes medication dose changes were made via the CDE Protocol and Collaborative Practice Agreement with the patient's primary care provider. A copy of this encounter was shared with the provider.

## 2018-01-29 NOTE — MR AVS SNAPSHOT
After Visit Summary   1/29/2018    Divina Delgadillo    MRN: [id number]           Patient Information     Date Of Birth          1985        Visit Information        Provider Department      1/29/2018 3:00 PM [place] [facility]        Care Instructions    Dolly Riley RD, LD, CDE  For Diabetes Education appointments call: [phone]   For Diabetes Education Related Questions call: [phone] or email: [email or web address]              Follow-ups after your visit        Your next 10 appointments already scheduled     Feb 20, 2018  2:30 PM CST   Diabetic Education with [place] [facility]   [facility] ([facility])    [address] [facility] [address]   [phone]            Jun 28, 2018 11:20 AM CDT   LAB with LAB FIRST FLOOR [facility])    [id number] [place] [id number] [phone]           Please do not eat 10-12 hours before your appointment if you are coming in fasting for labs on lipids, cholesterol, or glucose (sugar). This does not apply to pregnant women. Water, hot tea and black coffee (with nothing added) are okay. Do not drink other fluids, diet soda or chew gum.            Jun 28, 2018 11:40 AM CDT   Return Visit with Jeremiah Perdomo MD   [facility])    [address] [place] [id number] [phone]            Jul 10, 2018  9:30 AM CDT   LAB with LAB FIRST FLOOR [facility] ([facility])    [id number] [place] [id number] [phone]           Please do not eat 10-12 hours before your appointment if you are coming in fasting for labs on lipids, cholesterol, or glucose (sugar). This does not apply to pregnant women. Water, hot tea and black coffee (with nothing added) are okay. Do not drink  "other fluids, diet soda or chew gum.            Jul 10, 2018 10:00 AM CDT   [facility]    [address] [place] [id number] [phone]              Who to contact     If you have questions or need follow up information about today's clinic visit or your schedule please contact [facility] directly at [phone].  Normal or non-critical lab and imaging results will be communicated to you by MyChart, letter or phone within 4 business days after the clinic has received the results. If you do not hear from us within 7 days, please contact the clinic through RoomCliphart or phone. If you have a critical or abnormal lab result, we will notify you by phone as soon as possible.  Submit refill requests through to be or call your pharmacy and they will forward the refill request to us. Please allow 3 business days for your refill to be completed.          Additional Information About Your Visit        RoomClipharDatappraise Information     to be lets you send messages to your doctor, view your test results, renew your prescriptions, schedule appointments and more. To sign up, go to [email or web address] . Click on \"Log in\" on the left side of the screen, which will take you to the Welcome page. Then click on \"Sign up Now\" on the right side of the page.     You will be asked to enter the access code listed below, as well as some personal information. Please follow the directions to create your username and password.     Your access code is: [email or web address]  Expires: 3/7/2018  9:45 AM     Your access code will  in 90 days. If you need help or a new code, please call your [facility] clinic or [phone].        Care EveryWhere ID     This is your Care EveryWhere ID. This could be used by other organizations to access your [facility] medical records  [id number]         Blood Pressure from Last 3 " Encounters:   01/19/18 128/72   01/16/18 130/70   01/09/18 151/73    Weight from Last 3 Encounters:   01/19/18 86.6 kg (191 lb)   01/16/18 89.8 kg (198 lb)   01/09/18 89.9 kg (198 lb 3.2 oz)              Today, you had the following     No orders found for display       Primary Care Provider Office Phone # Fax #    Jaleesa Velasquez, VERONIQUE [phone] [address] [facility] [address]        Equal Access to Services     AARON HAY : Hadii aad ku hadasho Soomaali, waaxda luqadaha, qaybta kaalmada adequangyada, aaron echeverria . So [facility] [phone].    ATENCIÓN: Si habla español, tiene a gil disposición servicios gratuitos de asistencia lingüística. Llame al [phone].    We comply with applicable federal civil rights laws and [place] laws. We do not discriminate on the basis of race, color, national origin, age, disability, sex, sexual orientation, or gender identity.            Thank you!     Thank you for choosing [facility]  for your care. Our goal is always to provide you with excellent care. Hearing back from our patients is one way we can continue to improve our services. Please take a few minutes to complete the written survey that you may receive in the mail after your visit with us. Thank you!             Your Updated Medication List - Protect others around you: Learn how to safely use, store and throw away your medicines at www.disposemymeds.org.          This list is accurate as of 1/29/18  4:11 PM.  Always use your most recent med list.                   Brand Name Dispense Instructions for use Diagnosis    ABILIFY 30 MG tablet   Generic drug:  ARIPiprazole      Take 30 mg by mouth daily        albuterol 108 (90 BASE) MCG/ACT Inhaler    PROAIR HFA/PROVENTIL HFA/VENTOLIN HFA    1 Inhaler    Inhale 2 puffs into the lungs every 6 hours as needed for shortness of breath / dyspnea or wheezing    Mild intermittent asthma with  acute exacerbation       amLODIPine 5 MG tablet    NORVASC    90 tablet    Take 1 tablet (5 mg) by mouth daily    Essential hypertension       blood glucose lancets standard    no brand specified    1    needs lancet     Type II or unspecified type diabetes mellitus without mention of complication, not stated as uncontrolled, Mild persistent asthma       blood glucose monitoring test strip    no brand specified    100 each    1 strip by In Vitro route 3 times daily    Type 2 diabetes mellitus with stage 3 chronic kidney disease, without long-term current use of insulin (H)       Clozapine 200 MG tablet    CLOZARIL     Take 200 mg by mouth 2 times daily        dulaglutide 1.5 MG/0.5ML pen    TRULICITY    6 mL    Inject 1.5 mg Subcutaneous every 7 days    Type 2 diabetes mellitus with stage 3 chronic kidney disease, without long-term current use of insulin (H)       FLUoxetine 20 MG capsule    PROzac     Take 20 mg by mouth daily        glipiZIDE 10 MG tablet    GLUCOTROL    60 tablet    Take 1 tablet (10 mg) by mouth 2 times daily (before meals)    Type 2 diabetes mellitus with stage 3 chronic kidney disease, without long-term current use of insulin (H)       insulin glargine 100 UNIT/ML injection    LANTUS SOLOSTAR    3 mL    Inject 19 units daily morning, increase by 2 units every 4 days until fasting BG less then 140 consistently    Type 2 diabetes mellitus with stage 3 chronic kidney disease, without long-term current use of insulin (H)       lisinopril 10 MG tablet    PRINIVIL/ZESTRIL    90 tablet    Take 1 tablet (10 mg) by mouth daily    Essential hypertension       montelukast 10 MG tablet    SINGULAIR    90 tablet    Take 1 tablet (10 mg) by mouth At Bedtime        omeprazole 20 MG CR capsule    priLOSEC    60 capsule    Take 1 capsule (20 mg) by mouth daily 1 capsule bid    Gastroesophageal reflux disease without esophagitis       ranitidine 300 MG tablet    ZANTAC    90 tablet    Take 1 tablet  (300 mg) by mouth At Bedtime    Gastroesophageal reflux disease without esophagitis

## 2018-01-30 RX ORDER — GLIPIZIDE 5 MG/1
5 TABLET ORAL DAILY
Qty: 30 TABLET | Refills: 2 | Status: SHIPPED | OUTPATIENT
Start: 2018-01-30 | End: 2018-02-02

## 2018-02-01 ENCOUNTER — CARE COORDINATION (OUTPATIENT)
Dept: CARE COORDINATION | Facility: CLINIC | Age: 33
End: 2018-02-01

## 2018-02-01 NOTE — PROGRESS NOTES
Clinic Care Coordination Contact    Situation: Patient chart reviewed by care coordinator.    Background: Patient active with CC. Recent contact has been with foster motherLorin (consent to communicate on file). Patient's health status has improved since being in foster care. She continues to struggle with diabetic control but is f/u with PCP and diabetic educator regularly. She is attempting to follow instructions given. A1C remains high but she has lost weight. Has support of foster parents.     Assessment: Patient with good support from foster parents and is following up with DE. No new CC needs at this time    Plan/Recommendations: Will close to active CC but will remain available should future needs arise.    Jose De Jesus ASHLEYN,RN- BC  Clinic Care Coordinator  [facility]  Phone: [phone]

## 2018-02-02 ENCOUNTER — VIRTUAL VISIT (OUTPATIENT)
Dept: EDUCATION SERVICES | Facility: CLINIC | Age: 33
End: 2018-02-02
Payer: MEDICARE

## 2018-02-02 ENCOUNTER — TELEPHONE (OUTPATIENT)
Dept: FAMILY MEDICINE | Facility: CLINIC | Age: 33
End: 2018-02-02

## 2018-02-02 DIAGNOSIS — N18.30 TYPE 2 DIABETES MELLITUS WITH STAGE 3 CHRONIC KIDNEY DISEASE, WITHOUT LONG-TERM CURRENT USE OF INSULIN (H): Primary | ICD-10-CM

## 2018-02-02 DIAGNOSIS — E11.22 TYPE 2 DIABETES MELLITUS WITH STAGE 3 CHRONIC KIDNEY DISEASE, WITHOUT LONG-TERM CURRENT USE OF INSULIN (H): ICD-10-CM

## 2018-02-02 DIAGNOSIS — E11.22 TYPE 2 DIABETES MELLITUS WITH STAGE 3 CHRONIC KIDNEY DISEASE, WITHOUT LONG-TERM CURRENT USE OF INSULIN (H): Primary | ICD-10-CM

## 2018-02-02 DIAGNOSIS — N18.30 TYPE 2 DIABETES MELLITUS WITH STAGE 3 CHRONIC KIDNEY DISEASE, WITHOUT LONG-TERM CURRENT USE OF INSULIN (H): ICD-10-CM

## 2018-02-02 NOTE — TELEPHONE ENCOUNTER
Foster mom, Lorin, called concerned this morning about patient's BS.  Patient's BS was low before breakfast = 77 and she was shaky.  When RN spoke with Lorin, patient had since eaten (oatmeal with brown sugar and milk) and her BS was now reading 189.  Lorin reports patient is feeling much better.    Lorin's concern is patient's BS continues to drop - readings below.  BS all taken before breakfast  1-26-18 = 195  1-27-18 = 103  1-28-18 = 161  1-29-18 = 161  1-30-18 = 102  2-1-18 = 85  2-2-18 = 77     Diabetic ed notes 1-29-18:  To reduce risk of low blood sugars recommended decreasing Glipizde from 5mg BID to 5mg QD and continue insulin titration up by 2 units every 4 days until 50% of the BGs are around 140mg/dl per PCPs instructions.    Lorin states yesterday patient received 27 units of insulin.  Lorin also mentions she spoke with Jaleesa and upon review of decreasing BS, recommended patient drop to 26 units today however, Lorin has not administered this dose yet due to previous reading of 77.  She will now administer based on BS reading of 189.    Will route to Katiana in Diabetic Ed for review and recommendation.    Mere KUNZ RN

## 2018-02-02 NOTE — TELEPHONE ENCOUNTER
Stop Glipizide, if BG still running low decrease Lantus. Yesterday I saw Ruthann (caregiver) and I told her to decrease it to 26 units. IF BG still less then 100 after stopping Glipizide decrease Lantus to 24 units.     VERONIQUE Spears CNP

## 2018-02-02 NOTE — MR AVS SNAPSHOT
After Visit Summary   2/2/2018    Divina Delgadillo    MRN: [id number]           Patient Information     Date Of Birth          1985        Visit Information        Provider Department      2/2/2018 11:00 AM [place] DIABETES ED RESOURCE [facility] [place]        Today's Diagnoses     Type 2 diabetes mellitus with stage 3 chronic kidney disease, without long-term current use of insulin (H)    -  1       Follow-ups after your visit        Your next 10 appointments already scheduled     Feb 20, 2018  2:30 PM CST   Diabetic Education with [place] DIABETES ED RESOURCE   [facility] ([facility])    [address] [facility] [address]   [phone]            Jun 28, 2018 11:20 AM CDT   LAB with LAB FIRST FLOOR [facility] ([facility])    [address] [place] [address]   [phone]           Please do not eat 10-12 hours before your appointment if you are coming in fasting for labs on lipids, cholesterol, or glucose (sugar). This does not apply to pregnant women. Water, hot tea and black coffee (with nothing added) are okay. Do not drink other fluids, diet soda or chew gum.            Jun 28, 2018 11:40 AM CDT   Return Visit with Jeremiah Perdomo MD   [facility] ([facility])    [address] [place] [address]   [phone]            Jul 10, 2018  9:30 AM CDT   LAB with LAB FIRST FLOOR [facility] ([facility])    [address] [place] [address]   [phone]           Please do not eat 10-12 hours before your appointment if you are coming in fasting for labs on lipids, cholesterol, or glucose (sugar). This does not apply to pregnant women. Water, hot tea and black coffee (with nothing added) are okay. Do not drink other fluids, diet soda or chew gum.            Jul 10, 2018 10:00 AM CDT  "  Return Visit with Sánchez Dias MD   [facility] ([facility])    [address] [place] [address] [phone]              Who to contact     If you have questions or need follow up information about today's clinic visit or your schedule please contact [facility] [place] directly at [phone].  Normal or non-critical lab and imaging results will be communicated to you by dINKhart, letter or phone within 4 business days after the clinic has received the results. If you do not hear from us within 7 days, please contact the clinic through dINKhart or phone. If you have a critical or abnormal lab result, we will notify you by phone as soon as possible.  Submit refill requests through [facility] or call your pharmacy and they will forward the refill request to us. Please allow 3 business days for your refill to be completed.          Additional Information About Your Visit        [employer] Information     [facility] lets you send messages to your doctor, view your test results, renew your prescriptions, schedule appointments and more. To sign up, go to [email or web address] . Click on \"Log in\" on the left side of the screen, which will take you to the Welcome page. Then click on \"Sign up Now\" on the right side of the page.     You will be asked to enter the access code listed below, as well as some personal information. Please follow the directions to create your username and password.     Your access code is: [email or web address]  Expires: 3/7/2018  9:45 AM     Your access code will  in 90 days. If you need help or a new code, please call your [facility] clinic or [phone].        Care EveryWhere ID     This is your Care EveryWhere ID. This could be used by other organizations to access your [facility] medical records  [id number]         Blood Pressure from Last 3 Encounters:   18 128/72   18 130/70   18 151/73    Weight from " Last 3 Encounters:   01/19/18 86.6 kg (191 lb)   01/16/18 89.8 kg (198 lb)   01/09/18 89.9 kg (198 lb 3.2 oz)              Today, you had the following     No orders found for display         Today's Medication Changes          These changes are accurate as of 2/2/18  1:39 PM.  If you have any questions, ask your nurse or doctor.               These medicines have changed or have updated prescriptions.        Dose/Directions    insulin glargine 100 UNIT/ML injection   Commonly known as:  LANTUS SOLOSTAR   This may have changed:  additional instructions   Used for:  Type 2 diabetes mellitus with stage 3 chronic kidney disease, without long-term current use of insulin (H)   Changed by:  Jaleesa Velasquez APRN CNP        Inject 26 units daily, if BG running low, or less then 100-120, decrease to 24 units   Quantity:  3 mL   Refills:  1            Where to get your medicines      Some of these will need a paper prescription and others can be bought over the counter.  Ask your nurse if you have questions.     Bring a paper prescription for each of these medications     insulin glargine 100 UNIT/ML injection                Primary Care Provider Office Phone # Fax #    VERONIQUE Bledsoe [phone] [address] [facility] [address]        Equal Access to Services     FANG HAY : Elsa hugoo Sotrace, waaxda luqadaha, qaybta kaalmada adeegyada, aaron munoz. So [facility] [phone].    ATENCIÓN: Si habla español, tiene a gil disposición servicios gratuitos de asistencia lingüística. Llame al [phone].    We comply with applicable federal civil rights laws and [place] laws. We do not discriminate on the basis of race, color, national origin, age, disability, sex, sexual orientation, or gender identity.            Thank you!     Thank you for choosing [facility]  for your care. Our goal is always to provide you with  excellent care. Hearing back from our patients is one way we can continue to improve our services. Please take a few minutes to complete the written survey that you may receive in the mail after your visit with us. Thank you!             Your Updated Medication List - Protect others around you: Learn how to safely use, store and throw away your medicines at www.disposemymeds.org.          This list is accurate as of 2/2/18  1:39 PM.  Always use your most recent med list.                   Brand Name Dispense Instructions for use Diagnosis    ABILIFY 30 MG tablet   Generic drug:  ARIPiprazole      Take 30 mg by mouth daily        albuterol 108 (90 BASE) MCG/ACT Inhaler    PROAIR HFA/PROVENTIL HFA/VENTOLIN HFA    1 Inhaler    Inhale 2 puffs into the lungs every 6 hours as needed for shortness of breath / dyspnea or wheezing    Mild intermittent asthma with acute exacerbation       amLODIPine 5 MG tablet    NORVASC    90 tablet    Take 1 tablet (5 mg) by mouth daily    Essential hypertension       blood glucose lancets standard    no brand specified    1    needs lancet     Type II or unspecified type diabetes mellitus without mention of complication, not stated as uncontrolled, Mild persistent asthma       blood glucose monitoring test strip    no brand specified    100 each    1 strip by In Vitro route 3 times daily    Type 2 diabetes mellitus with stage 3 chronic kidney disease, without long-term current use of insulin (H)       Clozapine 200 MG tablet    CLOZARIL     Take 200 mg by mouth 2 times daily        dulaglutide 1.5 MG/0.5ML pen    TRULICITY    6 mL    Inject 1.5 mg Subcutaneous every 7 days    Type 2 diabetes mellitus with stage 3 chronic kidney disease, without long-term current use of insulin (H)       FLUoxetine 20 MG capsule    PROzac     Take 20 mg by mouth daily        insulin glargine 100 UNIT/ML injection    LANTUS SOLOSTAR    3 mL    Inject 26 units daily, if BG running low, or less  then 100-120, decrease to 24 units    Type 2 diabetes mellitus with stage 3 chronic kidney disease, without long-term current use of insulin (H)       lisinopril 10 MG tablet    PRINIVIL/ZESTRIL    90 tablet    Take 1 tablet (10 mg) by mouth daily    Essential hypertension       montelukast 10 MG tablet    SINGULAIR    90 tablet    Take 1 tablet (10 mg) by mouth At Bedtime        omeprazole 20 MG CR capsule    priLOSEC    60 capsule    Take 1 capsule (20 mg) by mouth daily 1 capsule bid    Gastroesophageal reflux disease without esophagitis       ranitidine 300 MG tablet    ZANTAC    90 tablet    Take 1 tablet (300 mg) by mouth At Bedtime    Gastroesophageal reflux disease without esophagitis

## 2018-02-02 NOTE — TELEPHONE ENCOUNTER
Called out to Lorin (foster mom, with consent to speak to) and she verbalized understanding of concepts discussed and recommendations provided.   Will try stopping the other Glipizide and holding the insulin at 26 units for the next 4-5 days.  Reviewed why patients blood sugars could be changing quickly right now (significant diet changes, 2 new medications Trulicity and insulin, Divina just started working out again this week etc).   Reviewed the rule of 15 and how to treat a blood sugar.  Reviewed what each of the medications are doing.    Dolly Riley RD, LD, CDE  Diabetes Education

## 2018-02-02 NOTE — TELEPHONE ENCOUNTER
Jaleesa  RN spoke with Katiana, Diabetic Ed.  Katiana recommends d/c'ing glipizide daily and continue with 26 units insulin to try.    Ok to d/c glipizide?    Routing to provider.  Mere KUNZ RN

## 2018-02-02 NOTE — PROGRESS NOTES
Please see telephone encounter dated 2/2/2018 for details.    Dolly Riley RD, LD, CDE  Diabetes Education

## 2018-02-02 NOTE — TELEPHONE ENCOUNTER
Reason for call:  Patient reporting a symptom    Symptom or request: blood sugars going high than low    Duration (how long have symptoms been present): since monday    Have you been treated for this before? Yes    Additional comments: Lorin (foster mom) calling to let us know that pt's blood sugars are up and down and not much has changed.  1-26 was 195, 1-27 was 103, 1028 161, 1-29 was 161, 1-30 was 1-31 was 102, 2-1 was 85 and this morning was 77. She states she has given her her trulicity she takes that 1 time a week.  She is wondering what she should do?    Phone Number patient can be reached at:  Other phone number:  Lorin [phone]    Best Time:  any    Can we leave a detailed message on this number:  YES    Call taken on 2/2/2018 at 9:44 AM by Staci Woods

## 2018-02-08 NOTE — TELEPHONE ENCOUNTER
"Was able to reach Divina's  today. She confirmed that Divina has discontinued the Chlorthalidone and is on Lisinopril 10 mg daily.  BP readings as follows:  117/64  104/77  105/61  147/64  117/60  101/59  124/61  136/78  HR have not been documented.     Divina has been drinking water and is agreeable to repeating labs. They would prefer this to be done on 2/20 when following up with the diabetic educator. Of note, her Glipizide was stopped and blood glucose readings have been \"good\". Will await next lab draw on 2/20. Order placed for BMP.    Shelley Vail, RN, BSN  Nephrology Care Coordinator  [facility]    "

## 2018-02-20 ENCOUNTER — ALLIED HEALTH/NURSE VISIT (OUTPATIENT)
Dept: EDUCATION SERVICES | Facility: CLINIC | Age: 33
End: 2018-02-20
Payer: MEDICARE

## 2018-02-20 DIAGNOSIS — Z79.899 MEDICATION MANAGEMENT: ICD-10-CM

## 2018-02-20 DIAGNOSIS — E11.22 TYPE 2 DIABETES MELLITUS WITH STAGE 3 CHRONIC KIDNEY DISEASE, WITHOUT LONG-TERM CURRENT USE OF INSULIN (H): Primary | ICD-10-CM

## 2018-02-20 DIAGNOSIS — I10 ESSENTIAL HYPERTENSION: ICD-10-CM

## 2018-02-20 DIAGNOSIS — N18.30 TYPE 2 DIABETES MELLITUS WITH STAGE 3 CHRONIC KIDNEY DISEASE, WITHOUT LONG-TERM CURRENT USE OF INSULIN (H): Primary | ICD-10-CM

## 2018-02-20 LAB
ANION GAP SERPL CALCULATED.3IONS-SCNC: 5 MMOL/L (ref 3–14)
BASOPHILS # BLD AUTO: 0 10E9/L (ref 0–0.2)
BASOPHILS NFR BLD AUTO: 0 %
BUN SERPL-MCNC: 33 MG/DL (ref 7–30)
CALCIUM SERPL-MCNC: 9.3 MG/DL (ref 8.5–10.1)
CHLORIDE SERPL-SCNC: 111 MMOL/L (ref 94–109)
CO2 SERPL-SCNC: 25 MMOL/L (ref 20–32)
CREAT SERPL-MCNC: 1.45 MG/DL (ref 0.52–1.04)
DIFFERENTIAL METHOD BLD: ABNORMAL
EOSINOPHIL # BLD AUTO: 0 10E9/L (ref 0–0.7)
EOSINOPHIL NFR BLD AUTO: 0 %
ERYTHROCYTE [DISTWIDTH] IN BLOOD BY AUTOMATED COUNT: 14.3 % (ref 10–15)
GFR SERPL CREATININE-BSD FRML MDRD: 42 ML/MIN/1.7M2
GLUCOSE SERPL-MCNC: 142 MG/DL (ref 70–99)
HCT VFR BLD AUTO: 40.2 % (ref 35–47)
HGB BLD-MCNC: 13.1 G/DL (ref 11.7–15.7)
HOWELL-JOLLY BOD BLD QL SMEAR: PRESENT
LYMPHOCYTES # BLD AUTO: 8 10E9/L (ref 0.8–5.3)
LYMPHOCYTES NFR BLD AUTO: 45 %
MCH RBC QN AUTO: 30.3 PG (ref 26.5–33)
MCHC RBC AUTO-ENTMCNC: 32.6 G/DL (ref 31.5–36.5)
MCV RBC AUTO: 93 FL (ref 78–100)
MONOCYTES # BLD AUTO: 1.1 10E9/L (ref 0–1.3)
MONOCYTES NFR BLD AUTO: 6 %
NEUTROPHILS # BLD AUTO: 8.7 10E9/L (ref 1.6–8.3)
NEUTROPHILS NFR BLD AUTO: 49 %
PLATELET # BLD AUTO: 360 10E9/L (ref 150–450)
PLATELET # BLD EST: ABNORMAL 10*3/UL
POTASSIUM SERPL-SCNC: 4.5 MMOL/L (ref 3.4–5.3)
RBC # BLD AUTO: 4.32 10E12/L (ref 3.8–5.2)
RBC MORPH BLD: NORMAL
SODIUM SERPL-SCNC: 141 MMOL/L (ref 133–144)
VARIANT LYMPHS BLD QL SMEAR: PRESENT
WBC # BLD AUTO: 17.8 10E9/L (ref 4–11)

## 2018-02-20 PROCEDURE — 36415 COLL VENOUS BLD VENIPUNCTURE: CPT | Performed by: CLINICAL NURSE SPECIALIST

## 2018-02-20 PROCEDURE — 85025 COMPLETE CBC W/AUTO DIFF WBC: CPT | Performed by: CLINICAL NURSE SPECIALIST

## 2018-02-20 PROCEDURE — 80048 BASIC METABOLIC PNL TOTAL CA: CPT | Performed by: CLINICAL NURSE SPECIALIST

## 2018-02-20 PROCEDURE — G0108 DIAB MANAGE TRN  PER INDIV: HCPCS

## 2018-02-20 NOTE — MR AVS SNAPSHOT
After Visit Summary   2/20/2018    Divina Delgadillo    MRN: [id number]           Patient Information     Date Of Birth          1985        Visit Information        Provider Department      2/20/2018 2:30 PM [place] DIABETES ED RESOURCE [facility] [place]        Care Instructions    Tuesday - have Letitia show you how to use the Wii Fitness program (try it for 15 minutes)     Working out:   Wednesday  - dance program (30 minutes)     Saturday -  Try wii Fittness for 30 minutes     Keep writing down blood sugars    Decrease to 25 units     Dolly Riley RD, LD, CDE  For Diabetes Education appointments call: [phone]   For Diabetes Education Related Questions call: [phone] or email: [email or web address]            Follow-ups after your visit        Your next 10 appointments already scheduled     Feb 20, 2018  3:30 PM CST   LAB with [place] LAB   [facility] ([facility])    [address] [facility] [address]   [phone]           Please do not eat 10-12 hours before your appointment if you are coming in fasting for labs on lipids, cholesterol, or glucose (sugar). This does not apply to pregnant women. Water, hot tea and black coffee (with nothing added) are okay. Do not drink other fluids, diet soda or chew gum.            Mar 20, 2018  2:00 PM CDT   Diabetic Education with [place] DIABETES ED RESOURCE   [facility] ([facility])    [address] [facility] [address]   [phone]            Jun 28, 2018 11:20 AM CDT   LAB with LAB FIRST FLOOR [facility] ([facility])    [address] [place] [address] [phone]           Please do not eat 10-12 hours before your appointment if you are coming in fasting for labs on lipids, cholesterol, or glucose (sugar). This does not apply to pregnant women. Water, hot tea and black coffee (with nothing  added) are okay. Do not drink other fluids, diet soda or chew gum.            Jun 28, 2018 11:40 AM CDT   Return Visit with Jeremiah Perdomo MD   [facility] ([facility])    [address] [facility] [address]   [phone]            Jul 10, 2018  9:30 AM CDT   LAB with LAB FIRST FLOOR [facility] ([facility])    [address] [address]   [phone]           Please do not eat 10-12 hours before your appointment if you are coming in fasting for labs on lipids, cholesterol, or glucose (sugar). This does not apply to pregnant women. Water, hot tea and black coffee (with nothing added) are okay. Do not drink other fluids, diet soda or chew gum.            Jul 10, 2018 10:00 AM CDT   Return Visit with Sánchez Dias MD   [facility] ([facility])    [address] [address]   [phone]              Who to contact     If you have questions or need follow up information about today's clinic visit or your schedule please contact [facility] [place] directly at [phone].  Normal or non-critical lab and imaging results will be communicated to you by [facility] [employer], letter or phone within 4 business days after the clinic has received the results. If you do not hear from us within 7 days, please contact the clinic through MyChart or phone. If you have a critical or abnormal lab result, we will notify you by phone as soon as possible.  Submit refill requests through [facility] or call your pharmacy and they will forward the refill request to us. Please allow 3 business days for your refill to be completed.          Additional Information About Your Visit        [facility] Information     [facility] lets you send messages to your doctor, view your test results, renew your prescriptions, schedule appointments and more. To sign up, go to  [email or web address] . Click on \"Log in\" on the left side of the screen, which will take you to the Welcome page. Then click on \"Sign up Now\" on the right side of the page.     You will be asked to enter the access code listed below, as well as some personal information. Please follow the directions to create your username and password.     Your access code is: [email or web address]  Expires: 3/7/2018  9:45 AM     Your access code will  in 90 days. If you need help or a new code, please call your [facility] clinic or [phone].        Care EveryWhere ID     This is your Care EveryWhere ID. This could be used by other organizations to access your [facility] medical records  [id number]         Blood Pressure from Last 3 Encounters:   18 128/72   18 130/70   18 151/73    Weight from Last 3 Encounters:   18 86.6 kg (191 lb)   18 89.8 kg (198 lb)   18 89.9 kg (198 lb 3.2 oz)              Today, you had the following     No orders found for display       Primary Care Provider Office Phone # Fax #    Jaleesa PhillipsVERONIQUE Javier [phone] [address] [facility] [address]        Equal Access to Services     FANG HAY : Hadii juan manuel martinez Sotrace, waaxda luqadaha, qaybta kaalmada alejo, aaron echeverria . So [facility] [phone].    ATENCIÓN: Si habla español, tiene a gil disposición servicios gratuitos de asistencia lingüística. Carlo al [phone].    We comply with applicable federal civil rights laws and [place] laws. We do not discriminate on the basis of race, color, national origin, age, disability, sex, sexual orientation, or gender identity.            Thank you!     Thank you for choosing [facility]  for your care. Our goal is always to provide you with excellent care. Hearing back from our patients is one way we can continue to improve our services. Please take a few minutes to complete the " written survey that you may receive in the mail after your visit with us. Thank you!             Your Updated Medication List - Protect others around you: Learn how to safely use, store and throw away your medicines at www.disposemymeds.org.          This list is accurate as of 2/20/18  3:02 PM.  Always use your most recent med list.                   Brand Name Dispense Instructions for use Diagnosis    ABILIFY 30 MG tablet   Generic drug:  ARIPiprazole      Take 30 mg by mouth daily        albuterol 108 (90 BASE) MCG/ACT Inhaler    PROAIR HFA/PROVENTIL HFA/VENTOLIN HFA    1 Inhaler    Inhale 2 puffs into the lungs every 6 hours as needed for shortness of breath / dyspnea or wheezing    Mild intermittent asthma with acute exacerbation       amLODIPine 5 MG tablet    NORVASC    90 tablet    Take 1 tablet (5 mg) by mouth daily    Essential hypertension       blood glucose lancets standard    no brand specified    1    needs lancet     Type II or unspecified type diabetes mellitus without mention of complication, not stated as uncontrolled, Mild persistent asthma       blood glucose monitoring test strip    no brand specified    100 each    1 strip by In Vitro route 3 times daily    Type 2 diabetes mellitus with stage 3 chronic kidney disease, without long-term current use of insulin (H)       Clozapine 200 MG tablet    CLOZARIL     Take 200 mg by mouth 2 times daily        dulaglutide 1.5 MG/0.5ML pen    TRULICITY    6 mL    Inject 1.5 mg Subcutaneous every 7 days    Type 2 diabetes mellitus with stage 3 chronic kidney disease, without long-term current use of insulin (H)       FLUoxetine 20 MG capsule    PROzac     Take 20 mg by mouth daily        insulin glargine 100 UNIT/ML injection    LANTUS SOLOSTAR    3 mL    Inject 26 units daily, if BG running low, or less then 100-120, decrease to 24 units    Type 2 diabetes mellitus with stage 3 chronic kidney disease, without long-term current use of insulin  (H)       lisinopril 10 MG tablet    PRINIVIL/ZESTRIL    90 tablet    Take 1 tablet (10 mg) by mouth daily    Essential hypertension       montelukast 10 MG tablet    SINGULAIR    90 tablet    Take 1 tablet (10 mg) by mouth At Bedtime    Mild intermittent asthma with acute exacerbation       omeprazole 20 MG CR capsule    priLOSEC    60 capsule    Take 1 capsule (20 mg) by mouth daily 1 capsule bid    Gastroesophageal reflux disease without esophagitis       ranitidine 300 MG tablet    ZANTAC    90 tablet    Take 1 tablet (300 mg) by mouth At Bedtime    Gastroesophageal reflux disease without esophagitis

## 2018-02-20 NOTE — PATIENT INSTRUCTIONS
Tuesday - have Letitia show you how to use the Wii Fitness program (try it for 15 minutes)     Working out:   Wednesday  - dance program (30 minutes)     Saturday -  Try wii Fittness for 30 minutes     Keep writing down blood sugars    Decrease to 25 units     Dolly Riley RD, LD, CDE  For Diabetes Education appointments call: [phone]   For Diabetes Education Related Questions call: [phone] or email: [email or web address]

## 2018-02-20 NOTE — PROGRESS NOTES
Diabetes Self Management Training: Follow-up Visit    Divina Delgadillo presents today for evaluation of glucose control related to Type 2 diabetes.  She is accompanied by self and foster mother    Patient's diabetes management related comments/concerns: Overall has been doing well.  Increased snacking this week and difficulty staying on track with exercise.  Patient would like this visit to be focused around the following diabetes-related behaviors and goals: Self-care behavioral goal setting    ASSESSMENT:  Patient Problem List reviewed for relevant medical history and current medical status.    Current Diabetes Management per Patient:  Taking diabetes medications?   yes:     Diabetes Medication(s)     Insulin Sig    insulin glargine (LANTUS SOLOSTAR) 100 UNIT/ML injection Inject 26 units daily, if BG running low, or less then 100-120, decrease to 24 units    Incretin Mimetic Agents (GLP-1 Receptor Agonists) Sig    dulaglutide (TRULICITY) 1.5 MG/0.5ML pen Inject 1.5 mg Subcutaneous every 7 days      Lantus 26-0-0-0  Trulicity tolerating     *Abbreviated insulin dose documentation key: Insulin Trade Name (dczuuqfyh-pbocf-dfolmq-bedtime) - i.e. Humalog 5-5-5-0 (Humalog 5 units at breakfast, 5 units at lunch, and 5 units at dinner).    Patient glucose self monitoring as follows: three times daily.   BG meter: did not bring meter  BG results:   Fastin- 150.  75% below 130mg/dL.  Increased numbers related to snacks  Pre/post meals not  into columns so difficult to assess which are pre/post  Had a 73 pre meal.  Only 3 numbers above 180mg/dL.  Majority 120s-150s.      BG values are: In goal  Patient's most recent is not meeting goal of <7.0  Lab Results   Component Value Date    A1C 10.0 2018    A1C 9.6 2017    A1C 7.7 2017    A1C 7.8 06/15/2017    A1C 6.6 2017       Nutrition:  Patient eats 3 meals per day    Breakfast - oatmeal with butter + brown sugar  + glass of milk   No  "changes to lunch/dinner    Physical Activity:    [employer] - has not been going more than a few times   Has activities at home, but not doing     Diabetes Complications:  Acute Complications: At risk for hypoglycemia? yes  Frequency of hypoglycemia: has not had, but two x 70s.  Occasionally feels shaky in the 80s   Patient carries a carbohydrate source with them regularly: Yes     Vitals:  Wt 89.8 kg (198 lb)  BMI 33.46 kg/m2  Estimated body mass index is 33.46 kg/(m^2) as calculated from the following:    Height as of 1/19/18: 1.638 m (5' 4.5\").    Weight as of this encounter: 89.8 kg (198 lb).     Wt Readings from Last 5 Encounters:   01/19/18 86.6 kg (191 lb)   01/16/18 89.8 kg (198 lb)   01/09/18 89.9 kg (198 lb 3.2 oz)   01/03/18 89.2 kg (196 lb 11.2 oz)   12/14/17 90.7 kg (199 lb 14.4 oz)       Last 3 BP:   BP Readings from Last 3 Encounters:   01/19/18 128/72   01/16/18 130/70   01/09/18 151/73       History   Smoking Status     Current Every Day Smoker     Packs/day: 1.00     Years: 4.00     Types: Cigarettes   Smokeless Tobacco     Never Used     Comment: 1 packe every other day        Labs:  Lab Results   Component Value Date    A1C 10.0 01/19/2018     Lab Results   Component Value Date     01/19/2018     Lab Results   Component Value Date     06/15/2017     HDL Cholesterol   Date Value Ref Range Status   06/15/2017 72 >49 mg/dL Final   ]  GFR Estimate   Date Value Ref Range Status   01/19/2018 31 (L) >60 mL/min/1.7m2 Final     Comment:     Non  GFR Calc     GFR Estimate If Black   Date Value Ref Range Status   01/19/2018 37 (L) >60 mL/min/1.7m2 Final     Comment:      GFR Calc     Lab Results   Component Value Date    CR 1.90 01/19/2018     No results found for: MICROALBUMIN    Health Beliefs and Attitudes:   Patient Activation Measure Survey Score:  No flowsheet data found.    Stage of Change: ACTION (Actively working towards " change)    INTERVENTION:  Focused on goal setting with snacks and activity. Set up a BG log with days highlited as her goals to workout.  Recommended setting 1 task each day (i.e. Measure out the brown sugar, learn of to use the [employer] fitness etc).  Encouraged to write more comments down on BG log; she enjoys keeping track of BGs.       Reviewed how diet/activity/wt loss could lead to lowering/weaning off the insulin dose.  Sometimes may be eating a little bit more due to feeling shaky.  Recommended decreasing by 1 unit (starting the wean) with numbers in goal and one fasting in the 70s pre lunch. No concerns with the Trulicity.     Follow up via phone if BGs are low or elevated, otherwise follow up in office in 1 month to check in again.     Education provided today on:  AADE Self-Care Behaviors:  Healthy Eating: carbohydrate counting, consistency in amount, composition, and timing of food intake, weight reduction and portion control  Being Active: relationship to blood glucose, describe appropriate activity program and precautions to take  Monitoring: log and interpret results, individual blood glucose targets and frequency of monitoring  Taking Medication: action of prescribed medication, side effects of prescribed medications   Problem Solving: low blood glucose - causes, signs/symptoms, treatment and prevention and carrying a carbohydrate source at all times    Opportunities for ongoing education and support in diabetes-self management were discussed.  Pt verbalized understanding of concepts discussed and recommendations provided today.       Education Materials Provided:  BG Log Sheet    PLAN:  See Patient Instructions for co-developed, patient-stated behavior change goals.  AVS printed and provided to patient today.    FOLLOW-UP:  Follow-up appointment scheduled on 03/20/2018.    Ongoing plan for education and support: Follow-up visit with diabetes educator in 1 month and Follow-up with primary care  provider    Dolly Riley RD, DANIEL, CDE  Diabetes Education    Time Spent: 30 minutes  Encounter Type: Individual    Any diabetes medication dose changes were made via the CDE Protocol and Collaborative Practice Agreement with the patient's primary care provider.

## 2018-02-21 VITALS — BODY MASS INDEX: 33.46 KG/M2 | WEIGHT: 198 LBS

## 2018-03-19 DIAGNOSIS — N18.30 TYPE 2 DIABETES MELLITUS WITH STAGE 3 CHRONIC KIDNEY DISEASE, WITHOUT LONG-TERM CURRENT USE OF INSULIN (H): ICD-10-CM

## 2018-03-19 DIAGNOSIS — E11.22 TYPE 2 DIABETES MELLITUS WITH STAGE 3 CHRONIC KIDNEY DISEASE, WITHOUT LONG-TERM CURRENT USE OF INSULIN (H): ICD-10-CM

## 2018-03-19 NOTE — TELEPHONE ENCOUNTER
"Requested Prescriptions   Pending Prescriptions Disp Refills     blood glucose monitoring (NO BRAND SPECIFIED) test strip  Last Written Prescription Date:  18  Last Fill Quantity: 100,  # refills: 1   Last office visit: 2018 with prescribing provider:  18   Future Office Visit:     100 each 1     Si strip by In Vitro route 3 times daily    Diabetic Supplies Protocol Passed    3/19/2018  4:24 PM       Passed - Patient is 18 years of age or older       Passed - Recent (6 mo) or future (30 days) visit within the authorizing provider's specialty    Patient had office visit in the last 6 months or has a visit in the next 30 days with authorizing provider.  See \"Patient Info\" tab in inbasket, or \"Choose Columns\" in Meds & Orders section of the refill encounter.            Request received for [employer] mini lancets not found on list.  "

## 2018-03-20 ENCOUNTER — ALLIED HEALTH/NURSE VISIT (OUTPATIENT)
Dept: EDUCATION SERVICES | Facility: CLINIC | Age: 33
End: 2018-03-20
Payer: MEDICARE

## 2018-03-20 DIAGNOSIS — E11.22 TYPE 2 DIABETES MELLITUS WITH STAGE 3 CHRONIC KIDNEY DISEASE, WITHOUT LONG-TERM CURRENT USE OF INSULIN (H): Primary | ICD-10-CM

## 2018-03-20 DIAGNOSIS — N18.30 TYPE 2 DIABETES MELLITUS WITH STAGE 3 CHRONIC KIDNEY DISEASE, WITHOUT LONG-TERM CURRENT USE OF INSULIN (H): Primary | ICD-10-CM

## 2018-03-20 PROCEDURE — G0108 DIAB MANAGE TRN  PER INDIV: HCPCS

## 2018-03-20 RX ORDER — LANCETS
EACH MISCELLANEOUS
Qty: 100 EACH | Refills: 11 | Status: SHIPPED | OUTPATIENT
Start: 2018-03-20 | End: 2018-04-11

## 2018-03-20 NOTE — PROGRESS NOTES
"Diabetes Self Management Training: Follow-up Visit    Divina Delgadillo presents today for evaluation of glucose control related to Type 2 diabetes.  She is accompanied by self and jackan    Patient's diabetes management related comments/concerns: has been starting to pay more attention to food.  Patient would like this visit to be focused around the following diabetes-related behaviors and goals: Being Active    ASSESSMENT:  Patient Problem List reviewed for relevant medical history and current medical status.    Current Diabetes Management per Patient:  Taking diabetes medications?   yes:     Diabetes Medication(s)     Insulin Sig    insulin glargine (LANTUS SOLOSTAR) 100 UNIT/ML injection Inject 26 units daily, if BG running low, or less then 100-120, decrease to 24 units    Incretin Mimetic Agents (GLP-1 Receptor Agonists) Sig    dulaglutide (TRULICITY) 1.5 MG/0.5ML pen Inject 1.5 mg Subcutaneous every 7 days      Lantus 25-0-0-0  Trulicity - no issues     *Abbreviated insulin dose documentation key: Insulin Trade Name (ttnjvufqk-chisr-efzkwg-bedtime) - i.e. Humalog 5-5-5-0 (Humalog 5 units at breakfast, 5 units at lunch, and 5 units at dinner).    Patient glucose self monitoring as follows: three times daily.   BG meter: Accu-Chek Haylee meter  BG results:   Fasting 58% in goal 105 to 170.    Checking after dinner & lunch: 95% under 180mg/dL.      BG values are: partially in goal   Patient's most recent is not meeting goal of <7.0  Lab Results   Component Value Date    A1C 10.0 01/19/2018    A1C 9.6 12/07/2017    A1C 7.7 09/07/2017    A1C 7.8 06/15/2017    A1C 6.6 02/27/2017       Nutrition:  Patient eats 3 meals per day    Physical Activity:    Wii, biking, walking    Diabetes Complications:  Not discussed today.    Vitals:  There were no vitals taken for this visit.  Estimated body mass index is 33.46 kg/(m^2) as calculated from the following:    Height as of 1/19/18: 1.638 m (5' 4.5\").    Weight as of " 2/21/18: 89.8 kg (198 lb).     Wt Readings from Last 5 Encounters:   02/21/18 89.8 kg (198 lb)   01/19/18 86.6 kg (191 lb)   01/16/18 89.8 kg (198 lb)   01/09/18 89.9 kg (198 lb 3.2 oz)   01/03/18 89.2 kg (196 lb 11.2 oz)       Last 3 BP:   BP Readings from Last 3 Encounters:   01/19/18 128/72   01/16/18 130/70   01/09/18 151/73       History   Smoking Status     Current Every Day Smoker     Packs/day: 1.00     Years: 4.00     Types: Cigarettes   Smokeless Tobacco     Never Used     Comment: 1 packe every other day        Labs:  Lab Results   Component Value Date    A1C 10.0 01/19/2018     Lab Results   Component Value Date     02/20/2018     Lab Results   Component Value Date     06/15/2017     HDL Cholesterol   Date Value Ref Range Status   06/15/2017 72 >49 mg/dL Final   ]  GFR Estimate   Date Value Ref Range Status   02/20/2018 42 (L) >60 mL/min/1.7m2 Final     Comment:     Non  GFR Calc     GFR Estimate If Black   Date Value Ref Range Status   02/20/2018 51 (L) >60 mL/min/1.7m2 Final     Comment:      GFR Calc     Lab Results   Component Value Date    CR 1.45 02/20/2018     No results found for: MICROALBUMIN    Health Beliefs and Attitudes:   Patient Activation Measure Survey Score:  No flowsheet data found.    Stage of Change: ACTION (Actively working towards change)      Diabetes knowledge and skills assessment:   Patient is knowledgeable in diabetes management concepts related to: Healthy Eating, Being Active, Monitoring, Taking Medication, Problem Solving, Reducing Risks and Healthy Coping  Patient needs further education on the following diabetes management concepts: Healthy Eating, Being Active, Monitoring and Taking Medication  Barriers to Learning Assessment: No Barriers identified  Based on learning assessment above, most appropriate setting for further diabetes education would be: Individual setting.    INTERVENTION:  Patient's focus is on activity and  decreasing insulin.  BGs muhc improved and after meals are in target.  Some fasting are elevated and patient knows this is related to snacking at night.  Goal is to reduce insulin while increasing activity/adjusting diet.     Focused on activity changes and goals.  Set up calendars for patient to brody activity.     Updated test strips order & sharps disposal container orders.     Education provided today on:  AADE Self-Care Behaviors:  Being Active: relationship to blood glucose    Opportunities for ongoing education and support in diabetes-self management were discussed.  Pt verbalized understanding of concepts discussed and recommendations provided today.       Education Materials Provided:  Calendars to brody activity for goals    PLAN:  See Patient Instructions for co-developed, patient-stated behavior change goals.  AVS printed and provided to patient today.    FOLLOW-UP:  Follow-up appointment scheduled on 04/18/2018.    Ongoing plan for education and support: Follow-up with primary care provider    Dolly Riley RD, DANIEL, SHORTY  Diabetes Education    Time Spent: 30 minutes  Encounter Type: Individual    Any diabetes medication dose changes were made via the CDE Protocol and Collaborative Practice Agreement with the patient's primary care provider. A copy of this encounter was shared with the provider.

## 2018-03-20 NOTE — MR AVS SNAPSHOT
After Visit Summary   3/20/2018    Divina Delgadillo    MRN: [id number]           Patient Information     Date Of Birth          1985        Visit Information        Provider Department      3/20/2018 2:00 PM [facility] [facility]        Today's Diagnoses     Type 2 diabetes mellitus with stage 3 chronic kidney disease, without long-term current use of insulin (H)    -  1      Care Instructions    Lab Results   Component Value Date    A1C 10.0 01/19/2018    A1C 9.6 12/07/2017    A1C 7.7 09/07/2017    A1C 7.8 06/15/2017    A1C 6.6 02/27/2017         New a1c on 4/19/18 or later    Jose exercise on calendars                  Follow-ups after your visit        Your next 10 appointments already scheduled     Apr 18, 2018  2:00 PM CDT   Diabetic Education with [place] [facility]   [facility] ([facility])    [address] [facility] [address]   [phone]            Jun 28, 2018 11:20 AM CDT   LAB with LAB FIRST FLOOR [facility])    [address] [place] [id number] [phone]           Please do not eat 10-12 hours before your appointment if you are coming in fasting for labs on lipids, cholesterol, or glucose (sugar). This does not apply to pregnant women. Water, hot tea and black coffee (with nothing added) are okay. Do not drink other fluids, diet soda or chew gum.            Jun 28, 2018 11:40 AM CDT   Return Visit with Jeremiah Perdomo MD   [facility])    [id number] [place] [address]   [phone]            Jul 10, 2018  9:30 AM CDT   LAB with LAB FIRST FLOOR [facility])    [address] [place] [address] [phone]           Please do not eat 10-12 hours before your appointment if you are coming in  "fasting for labs on lipids, cholesterol, or glucose (sugar). This does not apply to pregnant women. Water, hot tea and black coffee (with nothing added) are okay. Do not drink other fluids, diet soda or chew gum.            Jul 10, 2018 10:00 AM CDT   Return Visit with Sánchez Dias MD   [facility]    [address] [place] [address] [phone]              Who to contact     If you have questions or need follow up information about today's clinic visit or your schedule please contact [facility] directly at [phone].  Normal or non-critical lab and imaging results will be communicated to you by MyChart, letter or phone within 4 business days after the clinic has received the results. If you do not hear from us within 7 days, please contact the clinic through [employer] or phone. If you have a critical or abnormal lab result, we will notify you by phone as soon as possible.  Submit refill requests through [employer] or call your pharmacy and they will forward the refill request to us. Please allow 3 business days for your refill to be completed.          Additional Information About Your Visit        Mult[employer]igh[employer]elloYello Information     [employer] lets you send messages to your doctor, view your test results, renew your prescriptions, schedule appointments and more. To sign up, go to [email or web address] . Click on \"Log in\" on the left side of the screen, which will take you to the Welcome page. Then click on \"Sign up Now\" on the right side of the page.     You will be asked to enter the access code listed below, as well as some personal information. Please follow the directions to create your username and password.     Your access code is: [email or web address]  Expires: 2018  3:08 PM     Your access code will  in 90 days. If you need help or a new code, please call your [facility] clinic or [phone].        Care EveryWhere ID  "    This is your Care EveryWhere ID. This could be used by other organizations to access your [facility] medical records  [id number]         Blood Pressure from Last 3 Encounters:   01/19/18 128/72   01/16/18 130/70   01/09/18 151/73    Weight from Last 3 Encounters:   02/21/18 89.8 kg (198 lb)   01/19/18 86.6 kg (191 lb)   01/16/18 89.8 kg (198 lb)              Today, you had the following     No orders found for display         Today's Medication Changes          These changes are accurate as of 3/20/18  3:08 PM.  If you have any questions, ask your nurse or doctor.               Start taking these medicines.        Dose/Directions    blood glucose monitoring lancets   Used for:  Type 2 diabetes mellitus with stage 3 chronic kidney disease, without long-term current use of insulin (H)        Use to test blood sugar 3 times daily   Quantity:  100 each   Refills:  11       ULTILET SHARPS CONTAINER 1QT Misc   Used for:  Type 2 diabetes mellitus with stage 3 chronic kidney disease, without long-term current use of insulin (H)        Dose:  1 Device   1 Device as needed   Quantity:  1 each   Refills:  11            Where to get your medicines      These medications [facility] to [facility] [place] - [address]  [address], [place] [address]    [facility]:  [facility] Phone:  [phone]     blood glucose monitoring lancets    ULTILET SHARPS CONTAINER T Misc                Primary Care Provider Office Phone # Fax #    Jaleesa Watson Eva, APRN [phone] [address] [facility] [address]        Equal Access to Services     FANG HAY AH: Hadii juan manuel moreno hadasho Soomaali, waaxda luqadaha, qaybta kaalmada adeegyabj, aaron munoz. So [facility] [phone].    ATENCIÓN: Si habla español, tiene a gil disposición servicios gratuitos de asistencia lingüística. Llame al [phone].    We comply with applicable federal  civil rights laws and [place] laws. We do not discriminate on the basis of race, color, national origin, age, disability, sex, sexual orientation, or gender identity.            Thank you!     Thank you for choosing [facility]  for your care. Our goal is always to provide you with excellent care. Hearing back from our patients is one way we can continue to improve our services. Please take a few minutes to complete the written survey that you may receive in the mail after your visit with us. Thank you!             Your Updated Medication List - Protect others around you: Learn how to safely use, store and throw away your medicines at www.disposemymeds.org.          This list is accurate as of 3/20/18  3:08 PM.  Always use your most recent med list.                   Brand Name Dispense Instructions for use Diagnosis    ABILIFY 30 MG tablet   Generic drug:  ARIPiprazole      Take 30 mg by mouth daily        albuterol 108 (90 BASE) MCG/ACT Inhaler    PROAIR HFA/PROVENTIL HFA/VENTOLIN HFA    1 Inhaler    Inhale 2 puffs into the lungs every 6 hours as needed for shortness of breath / dyspnea or wheezing    Mild intermittent asthma with acute exacerbation       amLODIPine 5 MG tablet    NORVASC    90 tablet    Take 1 tablet (5 mg) by mouth daily    Essential hypertension       blood glucose lancets standard    no brand specified    1    needs lancet     Type II or unspecified type diabetes mellitus without mention of complication, not stated as uncontrolled, Mild persistent asthma       blood glucose monitoring lancets     100 each    Use to test blood sugar 3 times daily    Type 2 diabetes mellitus with stage 3 chronic kidney disease, without long-term current use of insulin (H)       blood glucose monitoring test strip    no brand specified    100 each    1 strip by In Vitro route 3 times daily    Type 2 diabetes mellitus with stage 3 chronic kidney disease, without long-term  current use of insulin (H)       Clozapine 200 MG tablet    CLOZARIL     Take 200 mg by mouth 2 times daily        dulaglutide 1.5 MG/0.5ML pen    TRULICITY    6 mL    Inject 1.5 mg Subcutaneous every 7 days    Type 2 diabetes mellitus with stage 3 chronic kidney disease, without long-term current use of insulin (H)       FLUoxetine 20 MG capsule    PROzac     Take 20 mg by mouth daily        insulin glargine 100 UNIT/ML injection    LANTUS SOLOSTAR    3 mL    Inject 26 units daily, if BG running low, or less then 100-120, decrease to 24 units    Type 2 diabetes mellitus with stage 3 chronic kidney disease, without long-term current use of insulin (H)       lisinopril 10 MG tablet    PRINIVIL/ZESTRIL    90 tablet    Take 1 tablet (10 mg) by mouth daily    Essential hypertension       montelukast 10 MG tablet    SINGULAIR    90 tablet    Take 1 tablet (10 mg) by mouth At Bedtime    Mild intermittent asthma with acute exacerbation       omeprazole 20 MG CR capsule    priLOSEC    60 capsule    Take 1 capsule (20 mg) by mouth daily 1 capsule bid    Gastroesophageal reflux disease without esophagitis       ranitidine 300 MG tablet    ZANTAC    90 tablet    Take 1 tablet (300 mg) by mouth At Bedtime    Gastroesophageal reflux disease without esophagitis       ULTILET SHARPS CONTAINER 1QT Misc     1 each    1 Device as needed    Type 2 diabetes mellitus with stage 3 chronic kidney disease, without long-term current use of insulin (H)

## 2018-03-20 NOTE — TELEPHONE ENCOUNTER
"Sent a \"ULTILET SHARPS CONTAINER 1QT MISC\" to [facility] per patient & Prestondian's request and sent test strips as they needed them re-filled.     Dolly Riley RD, LD, CDE  Diabetes Education    "

## 2018-03-20 NOTE — PATIENT INSTRUCTIONS
Lab Results   Component Value Date    A1C 10.0 01/19/2018    A1C 9.6 12/07/2017    A1C 7.7 09/07/2017    A1C 7.8 06/15/2017    A1C 6.6 02/27/2017         New a1c on 4/19/18 or later    Jose exercise on calendars

## 2018-04-02 ENCOUNTER — TELEPHONE (OUTPATIENT)
Dept: FAMILY MEDICINE | Facility: CLINIC | Age: 33
End: 2018-04-02

## 2018-04-02 NOTE — TELEPHONE ENCOUNTER
Reason for call:  Patient reporting a symptom    Symptom or request: Patient has been having period like cramps the last couple of days. Patient is wondering if this could be from her IUD? Patient is concerned as she has a family history of tumors.    Duration (how long have symptoms been present): a couple days    Have you been treated for this before? No    Additional comments:     Phone Number patient can be reached at:  Home number on file [phone]    Best Time:  any    Can we leave a detailed message on this number:  YES    Call taken on 4/2/2018 at 2:30 PM by Gwendolyn Colunga

## 2018-04-02 NOTE — TELEPHONE ENCOUNTER
Patient reports stomach cramping - located on Lt Rt side mid-section, around umbilicus line.  Rating pain - 6-7/10 on pain scale, denies severe pain.  Started a couple days ago and has remained the same.  Constant pain.  States she had IUD placed about 1 year ago.  She does not check for strings monthly - states she was not aware she was to do this.  She reports intercourse about 1 month ago.  Patient reports she does not get her period nor does she get cramps.  Denies vaginal bleeding, fever, n/v, radiating pain, B/B problems, lightheadedness/dizziness, severe abdominal pain/swelling, inability to walk or any other symptoms at this time.    Patient also reports her foster mom does not know patient called.  She states her foster mom believes she is a drama queen and makes things up.  Patient spoke to her biological mom and wanted patient to have her symptoms looked at.    Rn advised appt - scheduled.  RN also advised s/s that would warrant her going to ER.  Patient agrees with plan and verbalized understanding.    Mere KUNZ RN

## 2018-04-11 ENCOUNTER — OFFICE VISIT (OUTPATIENT)
Dept: FAMILY MEDICINE | Facility: CLINIC | Age: 33
End: 2018-04-11
Payer: MEDICARE

## 2018-04-11 VITALS
BODY MASS INDEX: 31.5 KG/M2 | HEART RATE: 104 BPM | SYSTOLIC BLOOD PRESSURE: 113 MMHG | DIASTOLIC BLOOD PRESSURE: 67 MMHG | WEIGHT: 189.1 LBS | TEMPERATURE: 99 F | HEIGHT: 65 IN

## 2018-04-11 DIAGNOSIS — I10 ESSENTIAL HYPERTENSION: ICD-10-CM

## 2018-04-11 DIAGNOSIS — R94.4 ABNORMAL RENAL FUNCTION TEST: ICD-10-CM

## 2018-04-11 DIAGNOSIS — Z79.4 TYPE 2 DIABETES MELLITUS WITH STAGE 3 CHRONIC KIDNEY DISEASE, WITH LONG-TERM CURRENT USE OF INSULIN (H): Primary | ICD-10-CM

## 2018-04-11 DIAGNOSIS — E11.22 TYPE 2 DIABETES MELLITUS WITH STAGE 3 CHRONIC KIDNEY DISEASE, WITH LONG-TERM CURRENT USE OF INSULIN (H): Primary | ICD-10-CM

## 2018-04-11 DIAGNOSIS — N18.30 TYPE 2 DIABETES MELLITUS WITH STAGE 3 CHRONIC KIDNEY DISEASE, WITH LONG-TERM CURRENT USE OF INSULIN (H): Primary | ICD-10-CM

## 2018-04-11 DIAGNOSIS — Z11.3 SCREEN FOR STD (SEXUALLY TRANSMITTED DISEASE): ICD-10-CM

## 2018-04-11 LAB
ANION GAP SERPL CALCULATED.3IONS-SCNC: 9 MMOL/L (ref 3–14)
BUN SERPL-MCNC: 42 MG/DL (ref 7–30)
CALCIUM SERPL-MCNC: 9.5 MG/DL (ref 8.5–10.1)
CHLORIDE SERPL-SCNC: 108 MMOL/L (ref 94–109)
CO2 SERPL-SCNC: 20 MMOL/L (ref 20–32)
CREAT SERPL-MCNC: 2.35 MG/DL (ref 0.52–1.04)
GFR SERPL CREATININE-BSD FRML MDRD: 24 ML/MIN/1.7M2
GLUCOSE SERPL-MCNC: 102 MG/DL (ref 70–99)
HBA1C MFR BLD: 7.3 % (ref 0–6.4)
POTASSIUM SERPL-SCNC: 4.8 MMOL/L (ref 3.4–5.3)
SODIUM SERPL-SCNC: 137 MMOL/L (ref 133–144)

## 2018-04-11 PROCEDURE — 87491 CHLMYD TRACH DNA AMP PROBE: CPT | Performed by: NURSE PRACTITIONER

## 2018-04-11 PROCEDURE — 83036 HEMOGLOBIN GLYCOSYLATED A1C: CPT | Performed by: NURSE PRACTITIONER

## 2018-04-11 PROCEDURE — 87591 N.GONORRHOEAE DNA AMP PROB: CPT | Performed by: NURSE PRACTITIONER

## 2018-04-11 PROCEDURE — 99214 OFFICE O/P EST MOD 30 MIN: CPT | Performed by: NURSE PRACTITIONER

## 2018-04-11 PROCEDURE — 36415 COLL VENOUS BLD VENIPUNCTURE: CPT | Performed by: NURSE PRACTITIONER

## 2018-04-11 PROCEDURE — 80048 BASIC METABOLIC PNL TOTAL CA: CPT | Performed by: NURSE PRACTITIONER

## 2018-04-11 RX ORDER — LANCETS
EACH MISCELLANEOUS
Qty: 100 EACH | Refills: 11 | Status: SHIPPED | OUTPATIENT
Start: 2018-04-11 | End: 2019-07-15 | Stop reason: ALTCHOICE

## 2018-04-11 NOTE — NURSING NOTE
"Chief Complaint   Patient presents with     Diabetes     other     Has iud, would like to get pregnancy test done today      Refill Request     pending        Initial /67  Pulse 104  Temp 99  F (37.2  C) (Tympanic)  Ht 5' 4.5\" (1.638 m)  Wt 189 lb 1.6 oz (85.8 kg)  BMI 31.96 kg/m2 Estimated body mass index is 31.96 kg/(m^2) as calculated from the following:    Height as of this encounter: 5' 4.5\" (1.638 m).    Weight as of this encounter: 189 lb 1.6 oz (85.8 kg).  Medication Reconciliation: complete  "

## 2018-04-11 NOTE — PROGRESS NOTES
"  SUBJECTIVE:   Divina Delgadillo is a 33 year old female who presents to clinic today for the following health issues: diabetes recheck.  Recently had sexual intercourse with new partner, concerned about STDs, denies symptoms of vaginal discharge and pelvic pain.     Diabetes Follow-up    Patient is checking blood sugars: three times daily.   Results are as follows:       am - 110-150 afternoon- 160's PM- under 180     Diabetic concerns: None     Symptoms of hypoglycemia (low blood sugar): none     Paresthesias (numbness or burning in feet) or sores: Yes but has someone that takes care of feet/sores each month      Date of last diabetic eye exam: Last January     Hypertension Follow-up      Outpatient blood pressures are being checked at home.  Results are 120-130/70.    Low Salt Diet: not monitoring salt    BP Readings from Last 2 Encounters:   04/11/18 113/67   01/19/18 128/72     Hemoglobin A1C (%)   Date Value   04/11/2018 7.3 (H)   01/19/2018 10.0 (H)     LDL Cholesterol Calculated (mg/dL)   Date Value   06/15/2017 192 (H)   03/12/2009 80       Amount of exercise or physical activity: None    Problems taking medications regularly: No    Medication side effects: none    Diet: regular (no restrictions)    Problem list and histories reviewed & adjusted, as indicated.  Additional history: as documented    Labs reviewed in EPIC    Reviewed and updated as needed this visit by clinical staff  Tobacco  Allergies  Med Hx  Surg Hx  Fam Hx  Soc Hx      Reviewed and updated as needed this visit by Provider         ROS:  Constitutional, HEENT, cardiovascular, pulmonary, gi and gu systems are negative, except as otherwise noted.    OBJECTIVE:     /67  Pulse 104  Temp 99  F (37.2  C) (Tympanic)  Ht 5' 4.5\" (1.638 m)  Wt 189 lb 1.6 oz (85.8 kg)  BMI 31.96 kg/m2  Body mass index is 31.96 kg/(m^2).  GENERAL: healthy, alert and no distress  RESP: lungs clear to auscultation - no rales, rhonchi or wheezes  CV: " regular rate and rhythm, normal S1 S2, no S3 or S4, no murmur, click or rub, no peripheral edema and peripheral pulses strong  MS: no gross musculoskeletal defects noted, no edema  Diabetic foot exam: normal DP and PT pulses, no trophic changes or ulcerative lesions and normal sensory exam    Diagnostic Test Results:  Lab Results   Component Value Date    A1C 7.3 04/11/2018    A1C 10.0 01/19/2018    A1C 9.6 12/07/2017    A1C 7.7 09/07/2017    A1C 7.8 06/15/2017     ASSESSMENT/PLAN:     1. Type 2 diabetes mellitus with stage 3 chronic kidney disease, with long-term current use of insulin (H)  -significantly improved   -continue Lantus 25 units, reduce to 24, or lower if blood sugars running low, or ranging in 100-120' fasting, she will follow up with DM educator next week.    -continue Trulicity  -needs to exercise more   - Hemoglobin A1c; Future  - Basic metabolic panel; Future  - insulin glargine (LANTUS SOLOSTAR) 100 UNIT/ML injection; Inject 25 units daily, if BG running low, or less then 100-120, decrease to 24 units  Dispense: 12 mL; Refill: 2  - blood glucose monitoring (NO BRAND SPECIFIED) test strip; 1 strip by In Vitro route 3 times daily  Dispense: 100 each; Refill: 11  - blood glucose monitoring (SOFTCLIX) lancets; Use to test blood sugar 3 times daily  Dispense: 100 each; Refill: 11  - Hemoglobin A1c  - Basic metabolic panel  -follow up in 3 months     2. Screen for STD (sexually transmitted disease)  - Chlamydia trachomatis PCR  - Neisseria gonorrhoeae PCR    3. Essential hypertension  -well controlled  -continue current treatment plan       See Patient Instructions    VERONIQUE Spears [facility]

## 2018-04-11 NOTE — MR AVS SNAPSHOT
After Visit Summary   4/11/2018    Divina Delgadillo    MRN: [id number]           Patient Information     Date Of Birth          1985        Visit Information        Provider Department      4/11/2018 2:40 PM Jaleesa Velasquez APRN [facility]        Today's Diagnoses     Type 2 diabetes mellitus with stage 3 chronic kidney disease, with long-term current use of insulin (H)    -  1    Screen for STD (sexually transmitted disease)          Care Instructions    A1C  BMP  STD screening               Follow-ups after your visit        Your next 10 appointments already scheduled     Apr 18, 2018  2:00 PM CDT   Diabetic Education with [place] [facility]   [facility] ([facility])    [address] [address]   [phone]            Jun 28, 2018 11:20 AM CDT   LAB with LAB FIRST FLOOR [facility] ([facility])    [id number] [place] [id number] [phone]           Please do not eat 10-12 hours before your appointment if you are coming in fasting for labs on lipids, cholesterol, or glucose (sugar). This does not apply to pregnant women. Water, hot tea and black coffee (with nothing added) are okay. Do not drink other fluids, diet soda or chew gum.            Jun 28, 2018 11:40 AM CDT   Return Visit with Jeremiah Perdomo MD   [facility] ([facility])    [address] [place] [id number]   [phone]            Jul 10, 2018  9:30 AM CDT   LAB with LAB FIRST FLOOR [facility] ([facility])    [address] [place] [id number] [phone]           Please do not eat 10-12 hours before your appointment if you are coming in fasting for labs on lipids, cholesterol, or glucose (sugar). This does not apply to pregnant women. Water, hot tea and black coffee (with nothing  "added) are okay. Do not drink other fluids, diet soda or chew gum.            Jul 10, 2018 10:00 AM CDT   Return Visit with Sánchez Dias MD   [facility] ([facility])    [address] [place] [id number] [phone]              Future tests that were ordered for you today     Open Future Orders        Priority Expected Expires Ordered    Hemoglobin A1c Routine  2019    Basic metabolic panel Routine  2019            Who to contact     If you have questions or need follow up information about today's clinic visit or your schedule please contact [facility] directly at [phone].  Normal or non-critical lab and imaging results will be communicated to you by MyChart, letter or phone within 4 business days after the clinic has received the results. If you do not hear from us within 7 days, please contact the clinic through MyChart or phone. If you have a critical or abnormal lab result, we will notify you by phone as soon as possible.  Submit refill requests through [employer] or call your pharmacy and they will forward the refill request to us. Please allow 3 business days for your refill to be completed.          Additional Information About Your Visit        [employer] Information     [employer] lets you send messages to your doctor, view your test results, renew your prescriptions, schedule appointments and more. To sign up, go to [email or web address] . Click on \"Log in\" on the left side of the screen, which will take you to the Welcome page. Then click on \"Sign up Now\" on the right side of the page.     You will be asked to enter the access code listed below, as well as some personal information. Please follow the directions to create your username and password.     Your access code is: [email or web address]  Expires: 2018  3:08 PM     Your access code will  in 90 days. If you need help or a new code, please call " "your [place] clinic or [phone].        Care EveryWhere ID     This is your Care EveryWhere ID. This could be used by other organizations to access your [facility] medical records  [id number]        Your Vitals Were     Pulse Temperature Height BMI (Body Mass Index)          104 99  F (37.2  C) (Tympanic) 5' 4.5\" (1.638 m) 31.96 kg/m2         Blood Pressure from Last 3 Encounters:   04/11/18 113/67   01/19/18 128/72   01/16/18 130/70    Weight from Last 3 Encounters:   04/11/18 189 lb 1.6 oz (85.8 kg)   02/21/18 198 lb (89.8 kg)   01/19/18 191 lb (86.6 kg)              We Performed the Following     Chlamydia trachomatis PCR     Neisseria gonorrhoeae PCR          Today's Medication Changes          These changes are accurate as of 4/11/18  3:21 PM.  If you have any questions, ask your nurse or doctor.               These medicines have changed or have updated prescriptions.        Dose/Directions    insulin glargine 100 UNIT/ML injection   Commonly known as:  LANTUS SOLOSTAR   This may have changed:  additional instructions   Used for:  Type 2 diabetes mellitus with stage 3 chronic kidney disease, with long-term current use of insulin (H)   Changed by:  Jaleesa Velasquez APRN CNP        Inject 25 units daily, if BG running low, or less then 100-120, decrease to 24 units   Quantity:  12 mL   Refills:  2            Where to get your medicines      These medications were sent to [facility] - [facility] - [address]  [address], [place] [address]    Hours:  SILVER Mcnam[place]  Phone:  [phone]     blood glucose monitoring lancets    blood glucose monitoring test strip    insulin glargine 100 UNIT/ML injection                Primary Care Provider Office Phone # Fax #    VERONIQUE Bledsoe [phone] [address] [facility] [address]        Equal Access to Services     FANG HAY AH: Hadii claudia Delatorre " lashae enriquenusrat ruizaaron garcia. So [facility] [phone].    ATENCIÓN: Si samantha segura, tiene a gil disposición servicios gratuitos de asistencia lingüística. Carlo al [phone].    We comply with applicable federal civil rights laws and [place] laws. We do not discriminate on the basis of race, color, national origin, age, disability, sex, sexual orientation, or gender identity.            Thank you!     Thank you for choosing [facility]  for your care. Our goal is always to provide you with excellent care. Hearing back from our patients is one way we can continue to improve our services. Please take a few minutes to complete the written survey that you may receive in the mail after your visit with us. Thank you!             Your Updated Medication List - Protect others around you: Learn how to safely use, store and throw away your medicines at www.disposemymeds.org.          This list is accurate as of 4/11/18  3:21 PM.  Always use your most recent med list.                   Brand Name Dispense Instructions for use Diagnosis    ABILIFY 30 MG tablet   Generic drug:  ARIPiprazole      Take 30 mg by mouth daily        albuterol 108 (90 Base) MCG/ACT Inhaler    PROAIR HFA/PROVENTIL HFA/VENTOLIN HFA    1 Inhaler    Inhale 2 puffs into the lungs every 6 hours as needed for shortness of breath / dyspnea or wheezing    Mild intermittent asthma with acute exacerbation       amLODIPine 5 MG tablet    NORVASC    90 tablet    Take 1 tablet (5 mg) by mouth daily    Essential hypertension       blood glucose lancets standard    no brand specified    1    needs lancet     Type II or unspecified type diabetes mellitus without mention of complication, not stated as uncontrolled, Mild persistent asthma       blood glucose monitoring lancets     100 each    Use to test blood sugar 3 times daily    Type 2 diabetes mellitus with stage 3 chronic kidney  disease, with long-term current use of insulin (H)       blood glucose monitoring test strip    no brand specified    100 each    1 strip by In Vitro route 3 times daily    Type 2 diabetes mellitus with stage 3 chronic kidney disease, with long-term current use of insulin (H)       Clozapine 200 MG tablet    CLOZARIL     Take 200 mg by mouth 2 times daily        dulaglutide 1.5 MG/0.5ML pen    TRULICITY    6 mL    Inject 1.5 mg Subcutaneous every 7 days    Type 2 diabetes mellitus with stage 3 chronic kidney disease, without long-term current use of insulin (H)       FLUoxetine 20 MG capsule    PROzac     Take 20 mg by mouth daily        insulin glargine 100 UNIT/ML injection    LANTUS SOLOSTAR    12 mL    Inject 25 units daily, if BG running low, or less then 100-120, decrease to 24 units    Type 2 diabetes mellitus with stage 3 chronic kidney disease, with long-term current use of insulin (H)       lisinopril 10 MG tablet    PRINIVIL/ZESTRIL    90 tablet    Take 1 tablet (10 mg) by mouth daily    Essential hypertension       montelukast 10 MG tablet    SINGULAIR    90 tablet    Take 1 tablet (10 mg) by mouth At Bedtime    Mild intermittent asthma with acute exacerbation       omeprazole 20 MG CR capsule    priLOSEC    60 capsule    Take 1 capsule (20 mg) by mouth daily 1 capsule bid    Gastroesophageal reflux disease without esophagitis       ranitidine 300 MG tablet    ZANTAC    90 tablet    Take 1 tablet (300 mg) by mouth At Bedtime    Gastroesophageal reflux disease without esophagitis       ULTILET SHARPS CONTAINER 1QT Misc     1 each    1 Device as needed    Type 2 diabetes mellitus with stage 3 chronic kidney disease, without long-term current use of insulin (H)

## 2018-04-11 NOTE — LETTER
April 12, 2018      Divinapeter Delgadillo  [address] [address] [place] [address]        Dear ,    We are writing to inform you of your test results.    Negative results for chlamydia and gonorrhea.    Resulted Orders   Chlamydia trachomatis PCR   Result Value Ref Range    Specimen Description Urine     Chlamydia Trachomatis PCR Negative NEG^Negative      Comment:      Negative for C. trachomatis rRNA by transcription mediated amplification.  A negative result by transcription mediated amplification does not preclude   the presence of C. trachomatis infection because results are dependent on   proper and adequate collection, absence of inhibitors, and sufficient rRNA to   be detected.     Neisseria gonorrhoeae PCR   Result Value Ref Range    Specimen Descrip Urine     N Gonorrhea PCR Negative NEG^Negative      Comment:      Negative for N. gonorrhoeae rRNA by transcription mediated amplification.  A negative result by transcription mediated amplification does not preclude   the presence of N. gonorrhoeae infection because results are dependent on   proper and adequate collection, absence of inhibitors, and sufficient rRNA to   be detected.     Hemoglobin A1c   Result Value Ref Range    Hemoglobin A1C 7.3 (H) 0 - 6.4 %      Comment:      Normal <5.7% Prediabetes 5.7-6.4%  Diabetes 6.5% or higher - adopted from ADA   consensus guidelines.     Basic metabolic panel   Result Value Ref Range    Sodium 137 133 - 144 mmol/L    Potassium 4.8 3.4 - 5.3 mmol/L    Chloride 108 94 - 109 mmol/L    Carbon Dioxide 20 20 - 32 mmol/L    Anion Gap 9 3 - 14 mmol/L    Glucose 102 (H) 70 - 99 mg/dL    Urea Nitrogen 42 (H) 7 - 30 mg/dL    Creatinine 2.35 (H) 0.52 - 1.04 mg/dL    GFR Estimate 24 (L) >60 mL/min/1.7m2      Comment:      Non  GFR Calc    GFR Estimate If Black 29 (L) >60 mL/min/1.7m2      Comment:       GFR Calc    Calcium 9.5 8.5 - 10.1 mg/dL       If you have any questions or  concerns, please call the clinic at the number listed above.       Sincerely,        VERONIQUE Spears CNP

## 2018-04-12 LAB
C TRACH DNA SPEC QL NAA+PROBE: NEGATIVE
N GONORRHOEA DNA SPEC QL NAA+PROBE: NEGATIVE
SPECIMEN SOURCE: NORMAL
SPECIMEN SOURCE: NORMAL

## 2018-04-13 DIAGNOSIS — R94.4 ABNORMAL RENAL FUNCTION TEST: ICD-10-CM

## 2018-04-13 DIAGNOSIS — N18.30 TYPE 2 DIABETES MELLITUS WITH STAGE 3 CHRONIC KIDNEY DISEASE, WITH LONG-TERM CURRENT USE OF INSULIN (H): ICD-10-CM

## 2018-04-13 DIAGNOSIS — Z79.4 TYPE 2 DIABETES MELLITUS WITH STAGE 3 CHRONIC KIDNEY DISEASE, WITH LONG-TERM CURRENT USE OF INSULIN (H): ICD-10-CM

## 2018-04-13 DIAGNOSIS — E11.22 TYPE 2 DIABETES MELLITUS WITH STAGE 3 CHRONIC KIDNEY DISEASE, WITH LONG-TERM CURRENT USE OF INSULIN (H): ICD-10-CM

## 2018-04-13 PROCEDURE — 36415 COLL VENOUS BLD VENIPUNCTURE: CPT | Performed by: NURSE PRACTITIONER

## 2018-04-13 PROCEDURE — 80048 BASIC METABOLIC PNL TOTAL CA: CPT | Performed by: NURSE PRACTITIONER

## 2018-04-14 DIAGNOSIS — N18.30 TYPE 2 DIABETES MELLITUS WITH STAGE 3 CHRONIC KIDNEY DISEASE, WITH LONG-TERM CURRENT USE OF INSULIN (H): Primary | ICD-10-CM

## 2018-04-14 DIAGNOSIS — Z79.4 TYPE 2 DIABETES MELLITUS WITH STAGE 3 CHRONIC KIDNEY DISEASE, WITH LONG-TERM CURRENT USE OF INSULIN (H): Primary | ICD-10-CM

## 2018-04-14 DIAGNOSIS — E11.22 TYPE 2 DIABETES MELLITUS WITH STAGE 3 CHRONIC KIDNEY DISEASE, WITH LONG-TERM CURRENT USE OF INSULIN (H): Primary | ICD-10-CM

## 2018-04-14 LAB
ANION GAP SERPL CALCULATED.3IONS-SCNC: 8 MMOL/L (ref 3–14)
BUN SERPL-MCNC: 37 MG/DL (ref 7–30)
CALCIUM SERPL-MCNC: 8.9 MG/DL (ref 8.5–10.1)
CHLORIDE SERPL-SCNC: 108 MMOL/L (ref 94–109)
CO2 SERPL-SCNC: 21 MMOL/L (ref 20–32)
CREAT SERPL-MCNC: 1.57 MG/DL (ref 0.52–1.04)
GFR SERPL CREATININE-BSD FRML MDRD: 38 ML/MIN/1.7M2
GLUCOSE SERPL-MCNC: 188 MG/DL (ref 70–99)
POTASSIUM SERPL-SCNC: 4.7 MMOL/L (ref 3.4–5.3)
SODIUM SERPL-SCNC: 137 MMOL/L (ref 133–144)

## 2018-04-18 ENCOUNTER — ALLIED HEALTH/NURSE VISIT (OUTPATIENT)
Dept: EDUCATION SERVICES | Facility: CLINIC | Age: 33
End: 2018-04-18
Payer: MEDICARE

## 2018-04-18 DIAGNOSIS — Z79.4 TYPE 2 DIABETES MELLITUS WITH STAGE 3 CHRONIC KIDNEY DISEASE, WITH LONG-TERM CURRENT USE OF INSULIN (H): Primary | ICD-10-CM

## 2018-04-18 DIAGNOSIS — N18.30 TYPE 2 DIABETES MELLITUS WITH STAGE 3 CHRONIC KIDNEY DISEASE, WITH LONG-TERM CURRENT USE OF INSULIN (H): Primary | ICD-10-CM

## 2018-04-18 DIAGNOSIS — E11.22 TYPE 2 DIABETES MELLITUS WITH STAGE 3 CHRONIC KIDNEY DISEASE, WITH LONG-TERM CURRENT USE OF INSULIN (H): Primary | ICD-10-CM

## 2018-04-18 PROCEDURE — G0108 DIAB MANAGE TRN  PER INDIV: HCPCS

## 2018-04-18 NOTE — PROGRESS NOTES
Diabetes Self Management Training: Follow-up Visit    Divina Delgadillo presents today for evaluation of glucose control related to Type 2 diabetes.  She is accompanied by self and foster mother    Patient's diabetes management related comments/concerns: Hasn't started exercising.  Wants to improve health, maintain BG control and reduce health risks, but has a hard time staying on track.  Patient would like this visit to be focused around the following diabetes-related behaviors and goals: Being Active    ASSESSMENT:  Patient Problem List reviewed for relevant medical history and current medical status.    Current Diabetes Management per Patient:  Taking diabetes medications?   yes:     Diabetes Medication(s)     Insulin Sig    insulin glargine (LANTUS SOLOSTAR) 100 UNIT/ML injection Inject 25 units daily, if BG running low, or less then 100-120, decrease to 24 units    Incretin Mimetic Agents (GLP-1 Receptor Agonists) Sig    dulaglutide (TRULICITY) 1.5 MG/0.5ML pen Inject 1.5 mg Subcutaneous every 7 days          *Abbreviated insulin dose documentation key: Insulin Trade Name (rivjnyjlb-jduww-ymhwup-bedtime) - i.e. Humalog 5-5-5-0 (Humalog 5 units at breakfast, 5 units at lunch, and 5 units at dinner).    Patient glucose self monitoring as follows: three times daily.   BG results: see blood glucose log attached to this encounter     Overall in control with 2 readings > 200 after meals in the last month related to soda or higher carbs     BG values are: In goal  Patient's most recent is not meeting goal of <7.0  Lab Results   Component Value Date    A1C 7.3 04/11/2018    A1C 10.0 01/19/2018    A1C 9.6 12/07/2017    A1C 7.7 09/07/2017    A1C 7.8 06/15/2017       Nutrition:  Patient eats 3 meals per day    Physical Activity:    Went on one walk and to the gym once.     Diabetes Complications:  Chronic Complication Prevention: general risk reduction in relation to A1c    Vitals:  There were no vitals taken for this  "visit.  Estimated body mass index is 31.96 kg/(m^2) as calculated from the following:    Height as of 4/11/18: 1.638 m (5' 4.5\").    Weight as of 4/11/18: 85.8 kg (189 lb 1.6 oz).     Wt Readings from Last 5 Encounters:   04/11/18 85.8 kg (189 lb 1.6 oz)   02/21/18 89.8 kg (198 lb)   01/19/18 86.6 kg (191 lb)   01/16/18 89.8 kg (198 lb)   01/09/18 89.9 kg (198 lb 3.2 oz)       Last 3 BP:   BP Readings from Last 3 Encounters:   04/11/18 113/67   01/19/18 128/72   01/16/18 130/70       History   Smoking Status     Current Every Day Smoker     Packs/day: 1.00     Years: 4.00     Types: Cigarettes   Smokeless Tobacco     Never Used     Comment: little less than a pack a day        Labs:  Lab Results   Component Value Date    A1C 7.3 04/11/2018     Lab Results   Component Value Date     04/13/2018     Lab Results   Component Value Date     06/15/2017     HDL Cholesterol   Date Value Ref Range Status   06/15/2017 72 >49 mg/dL Final   ]  GFR Estimate   Date Value Ref Range Status   04/13/2018 38 (L) >60 mL/min/1.7m2 Final     Comment:     Non  GFR Calc     GFR Estimate If Black   Date Value Ref Range Status   04/13/2018 46 (L) >60 mL/min/1.7m2 Final     Comment:      GFR Calc     Lab Results   Component Value Date    CR 1.57 04/13/2018     No results found for: MICROALBUMIN    Socio/Economic Considerations:  Support system: family and friend(s)    Health Beliefs and Attitudes:   Patient Activation Measure Survey Score:  No flowsheet data found.    Stage of Change: PREPARATION (Decided to change - considering how)    Progress toward meeting diabetes-related behavioral goals:    GOALS % Met Goal   Healthy Eating     Physical Activity  (Complete 4/5 works outs per calendar schedule in April)   Monitoring     Medication Taking     Problem Solving     Healthy Coping     Risk Reduction         Diabetes knowledge and skills assessment:   Patient is knowledgeable in diabetes management " concepts related to: Being Active, Monitoring and Taking Medication  Patient needs further education on the following diabetes management concepts: Healthy Eating, Being Active, Problem Solving and Reducing Risks  Barriers to Learning Assessment: No Barriers identified  Based on learning assessment above, most appropriate setting for further diabetes education would be: Individual setting.    INTERVENTION:  Overall BG control much improved and in goal almost 100% of the time.  No medication changes at this time, however if more active and losing weight then can likely lessen insulin.   Divina would like to work towards getting off the insulin, but hasn't been able to yet.  Focused on activity changes and set up a detailed schedule     Mon/thur  - anytime fitness with her [employer] worker.  Advised Divina to discuss her goals with this person.   Wed - walk with foster mother   Sat - work out with house mate / exercise video     She is considering quitting smoking     Writer discussed with PCP this date.  Decided to try putting through a health coaching referral for phone visits.     Education provided today on:  AADE Self-Care Behaviors:  Being Active: relationship to blood glucose, describe appropriate activity program and precautions to take    Opportunities for ongoing education and support in diabetes-self management were discussed.  Pt verbalized understanding of concepts discussed and recommendations provided today.       Education Materials Provided:  Calendars with activity plans and goals     PLAN:  See Patient Instructions for co-developed, patient-stated behavior change goals.  AVS printed and provided to patient today.    FOLLOW-UP:  Follow-up appointment scheduled in May.    Ongoing plan for education and support: Follow-up with primary care provider    Dolly Riley RD, LD, CDE  Diabetes Education    Time Spent: 30 minutes  Encounter Type: Individual

## 2018-04-18 NOTE — MR AVS SNAPSHOT
After Visit Summary   4/18/2018    Divina Delgadillo    MRN: [id number]           Patient Information     Date Of Birth          1985        Visit Information        Provider Department      4/18/2018 2:00 PM [facility] [facility] [place]         Follow-ups after your visit        Your next 10 appointments already scheduled     May 23, 2018  2:00 PM CDT   Diabetic Education with [place] [facility]   [facility] ([facility])    [address] [facility] [address]   [phone]            Jun 28, 2018 11:20 AM CDT   LAB with [facility] FIRST F[facility]OR [facility])    [address] [place] [id number] [phone]           Please do not eat 10-12 hours before your appointment if you are coming in fasting for labs on lipids, cholesterol, or glucose (sugar). This does not apply to pregnant women. Water, hot tea and black coffee (with nothing added) are okay. Do not drink other fluids, diet soda or chew gum.            Jun 28, 2018 11:40 AM CDT   Return Visit with Jeremiah Perdomo MD   [facility])    [address] [place] [id number] [phone]            Jul 10, 2018  9:30 AM CDT   LAB with LAB FIRST FLOOR [facility] ([facility])    [address] [facility] [address] [phone]           Please do not eat 10-12 hours before your appointment if you are coming in fasting for labs on lipids, cholesterol, or glucose (sugar). This does not apply to pregnant women. Water, hot tea and black coffee (with nothing added) are okay. Do not drink other fluids, diet soda or chew gum.            Jul 10, 2018 10:00 AM CDT   Return Visit with Sánchez Dias MD   [facility])    [address] [place] [address]  "[place] [id number] [phone]              Who to contact     If you have questions or need follow up information about today's clinic visit or your schedule please contact [facility] directly at [phone].  Normal or non-critical lab and imaging results will be communicated to you by MyChart, letter or phone within 4 business days after the clinic has received the results. If you do not hear from us within 7 days, please contact the clinic through MyChart or phone. If you have a critical or abnormal lab result, we will notify you by phone as soon as possible.  Submit refill requests through [employer] or call your pharmacy and they will forward the refill request to us. Please allow 3 business days for your refill to be completed.          Additional Information About Your Visit        [employer] Information     [employer] lets you send messages to your doctor, view your test results, renew your prescriptions, schedule appointments and more. To sign up, go to [email or web address] . Click on \"Log in\" on the left side of the screen, which will take you to the Welcome page. Then click on \"Sign up Now\" on the right side of the page.     You will be asked to enter the access code listed below, as well as some personal information. Please follow the directions to create your username and password.     Your access code is: [email or web address]  Expires: 2018  3:08 PM     Your access code will  in 90 days. If you need help or a new code, please call your [facility] clinic or [phone].        Care EveryWhere ID     This is your Care EveryWhere ID. This could be used by other organizations to access your [facility] medical records  [id number]         Blood Pressure from Last 3 Encounters:   18 113/67   18 128/72   18 130/70    Weight from Last 3 Encounters:   18 85.8 kg (189 lb 1.6 oz)   18 89.8 kg (198 lb)   18 86.6 kg (191 lb)              Today, you had the " following     No orders found for display       Primary Care Provider Office Phone # Fax #    VERONIQUE Bledsoe [phone] [address] [facility] [address]        Equal Access to Services     FANG HAY : Hadii juan manuel ku hadyennifero Soomaali, waaxda luqadaha, qaybta kaalmada adeegyada, waxgeorge lizzyin haymartyn neha arniebambi munoz. So [facility] [phone].    ATENCIÓN: Si habla español, tiene a gil disposición servicios gratuitos de asistencia lingüística. Llame al [phone].    We comply with applicable federal civil rights laws and [place] laws. We do not discriminate on the basis of race, color, national origin, age, disability, sex, sexual orientation, or gender identity.            Thank you!     Thank you for choosing [facility]  for your care. Our goal is always to provide you with excellent care. Hearing back from our patients is one way we can continue to improve our services. Please take a few minutes to complete the written survey that you may receive in the mail after your visit with us. Thank you!             Your Updated Medication List - Protect others around you: Learn how to safely use, store and throw away your medicines at www.disposemymeds.org.          This list is accurate as of 4/18/18  3:09 PM.  Always use your most recent med list.                   Brand Name Dispense Instructions for use Diagnosis    ABILIFY 30 MG tablet   Generic drug:  ARIPiprazole      Take 30 mg by mouth daily        albuterol 108 (90 Base) MCG/ACT Inhaler    PROAIR HFA/PROVENTIL HFA/VENTOLIN HFA    1 Inhaler    Inhale 2 puffs into the lungs every 6 hours as needed for shortness of breath / dyspnea or wheezing    Mild intermittent asthma with acute exacerbation       amLODIPine 5 MG tablet    NORVASC    90 tablet    Take 1 tablet (5 mg) by mouth daily    Essential hypertension       blood glucose lancets standard    no brand specified    1    needs lancet      Type II or unspecified type diabetes mellitus without mention of complication, not stated as uncontrolled, Mild persistent asthma       blood glucose monitoring lancets     100 each    Use to test blood sugar 3 times daily    Type 2 diabetes mellitus with stage 3 chronic kidney disease, with long-term current use of insulin (H)       blood glucose monitoring test strip    no brand specified    100 each    1 strip by In Vitro route 3 times daily    Type 2 diabetes mellitus with stage 3 chronic kidney disease, with long-term current use of insulin (H)       Clozapine 200 MG tablet    CLOZARIL     Take 200 mg by mouth 2 times daily        dulaglutide 1.5 MG/0.5ML pen    TRULICITY    6 mL    Inject 1.5 mg Subcutaneous every 7 days    Type 2 diabetes mellitus with stage 3 chronic kidney disease, without long-term current use of insulin (H)       FLUoxetine 20 MG capsule    PROzac     Take 20 mg by mouth daily        insulin glargine 100 UNIT/ML injection    LANTUS SOLOSTAR    12 mL    Inject 25 units daily, if BG running low, or less then 100-120, decrease to 24 units    Type 2 diabetes mellitus with stage 3 chronic kidney disease, with long-term current use of insulin (H)       lisinopril 10 MG tablet    PRINIVIL/ZESTRIL    90 tablet    Take 1 tablet (10 mg) by mouth daily    Essential hypertension       montelukast 10 MG tablet    SINGULAIR    90 tablet    Take 1 tablet (10 mg) by mouth At Bedtime    Mild intermittent asthma with acute exacerbation       omeprazole 20 MG CR capsule    priLOSEC    60 capsule    Take 1 capsule (20 mg) by mouth daily 1 capsule bid    Gastroesophageal reflux disease without esophagitis       ranitidine 300 MG tablet    ZANTAC    90 tablet    Take 1 tablet (300 mg) by mouth At Bedtime    Gastroesophageal reflux disease without esophagitis       ULTILET SHARPS CONTAINER 1QT Misc     1 each    1 Device as needed    Type 2 diabetes mellitus with stage 3 chronic kidney disease,  without long-term current use of insulin (H)

## 2018-04-23 ENCOUNTER — TELEPHONE (OUTPATIENT)
Dept: NURSING | Facility: CLINIC | Age: 33
End: 2018-04-23

## 2018-04-24 DIAGNOSIS — N18.30 TYPE 2 DIABETES MELLITUS WITH STAGE 3 CHRONIC KIDNEY DISEASE, WITH LONG-TERM CURRENT USE OF INSULIN (H): ICD-10-CM

## 2018-04-24 DIAGNOSIS — E11.22 TYPE 2 DIABETES MELLITUS WITH STAGE 3 CHRONIC KIDNEY DISEASE, WITH LONG-TERM CURRENT USE OF INSULIN (H): ICD-10-CM

## 2018-04-24 DIAGNOSIS — Z79.4 TYPE 2 DIABETES MELLITUS WITH STAGE 3 CHRONIC KIDNEY DISEASE, WITH LONG-TERM CURRENT USE OF INSULIN (H): ICD-10-CM

## 2018-04-24 PROCEDURE — 82565 ASSAY OF CREATININE: CPT | Performed by: NURSE PRACTITIONER

## 2018-04-24 PROCEDURE — 36415 COLL VENOUS BLD VENIPUNCTURE: CPT | Performed by: NURSE PRACTITIONER

## 2018-04-25 DIAGNOSIS — I10 ESSENTIAL HYPERTENSION: ICD-10-CM

## 2018-04-25 LAB
CREAT SERPL-MCNC: 1.58 MG/DL (ref 0.52–1.04)
GFR SERPL CREATININE-BSD FRML MDRD: 38 ML/MIN/1.7M2

## 2018-04-25 RX ORDER — LISINOPRIL 10 MG/1
5 TABLET ORAL DAILY
Qty: 90 TABLET | Refills: 3
Start: 2018-04-25 | End: 2018-07-10

## 2018-05-01 ENCOUNTER — OFFICE VISIT (OUTPATIENT)
Dept: FAMILY MEDICINE | Facility: CLINIC | Age: 33
End: 2018-05-01
Payer: MEDICARE

## 2018-05-01 VITALS
HEIGHT: 65 IN | TEMPERATURE: 98.9 F | HEART RATE: 94 BPM | DIASTOLIC BLOOD PRESSURE: 57 MMHG | SYSTOLIC BLOOD PRESSURE: 118 MMHG | BODY MASS INDEX: 32.15 KG/M2 | RESPIRATION RATE: 20 BRPM | WEIGHT: 193 LBS

## 2018-05-01 DIAGNOSIS — H60.501 ACUTE OTITIS EXTERNA OF RIGHT EAR, UNSPECIFIED TYPE: Primary | ICD-10-CM

## 2018-05-01 PROCEDURE — 99213 OFFICE O/P EST LOW 20 MIN: CPT | Performed by: NURSE PRACTITIONER

## 2018-05-01 RX ORDER — NEOMYCIN SULFATE, POLYMYXIN B SULFATE AND HYDROCORTISONE 10; 3.5; 1 MG/ML; MG/ML; [USP'U]/ML
4 SUSPENSION/ DROPS AURICULAR (OTIC) 4 TIMES DAILY
Qty: 6 ML | Refills: 0 | Status: SHIPPED | OUTPATIENT
Start: 2018-05-01 | End: 2019-01-31

## 2018-05-01 NOTE — MR AVS SNAPSHOT
After Visit Summary   5/1/2018    Divina Delgadillo    MRN: [id number]           Patient Information     Date Of Birth          1985        Visit Information        Provider Department      5/1/2018 3:00 PM Oliva Mir NP [facility]        Today's Diagnoses     Acute otitis externa of right ear, unspecified type    -  1      Care Instructions      1.  Take ear drops as directed.    2.  Follow-up with a phone call on Friday if symptoms are not resolving.      Thank you for choosing [facility].  You may be receiving a survey in the mail from [employer] regarding your visit today.  Please take a few minutes to complete and return the survey to let us know how we are doing.      If you have questions or concerns, please contact us via [employer] or you can contact your care team at [phone].    Our Clinic hours are:  Monday 6:40 am  to 7:00 pm  Tuesday -Friday 6:40 am to 5:00 pm    The [place] outpatient lab hours are:  Monday - Friday 6:10 am to 4:45 pm  Saturdays 7:00 am to 11:00 am  Appointments are required, call [phone]    If you have clinical questions after hours or would like to schedule an appointment,  call the clinic at [phone].    External Ear Infection (Adult)    External otitis (also called  swimmer s ear ) is an infection in the ear canal. It is often caused by bacteria or fungus. It can occur a few days after water gets trapped in the ear canal (from swimming or bathing). It can also occur after cleaning too deeply in the ear canal with a cotton swab or other object. Sometimes, hair care products get into the ear canal and cause this problem.  Symptoms can include pain, fever, itching, redness, drainage, or swelling of the ear canal. Temporary hearing loss may also occur.  Home care    Do not try to clean the ear canal. This can push pus and bacteria deeper into the canal.    Use prescribed ear drops as directed. These help reduce swelling and  fight the infection. If an ear wick was placed in the ear canal, apply drops right onto the end of the wick. The wick will draw the medicine into the ear canal even if it is swollen closed.    A cotton ball may be loosely placed in the outer ear to absorb any drainage.    You may use acetaminophen or ibuprofen to control pain, unless another medicine was prescribed. Note: If you have chronic liver or kidney disease or ever had a stomach ulcer or GI bleeding, talk to your healthcare provider before taking any of these medicines.    Do not allow water to get into your ear when bathing. Also, don't swim until the infection has cleared.  Prevention    Keep your ears dry. This helps lower the risk of infection. Dry your ears with a towel or hair dryer after getting wet. Also, use ear plugs when swimming.    Do not stick any objects in the ear to remove wax.    If you feel water trapped in your ear, use ear drops right away. You can get these drops over the counter at most drugstores. They work by removing water from the ear canal.  Follow-up care  Follow up with your healthcare provider in 1 week, or as advised.  When to seek medical advice  Call your healthcare provider right away if any of these occur:    Ear pain becomes worse or doesn t improve after 3 days of treatment    Redness or swelling of the outer ear occurs or gets worse    Headache    Painful or stiff neck    Drowsiness or confusion    Fever of 100.4 F (38 C) or higher, or as directed by your healthcare provider    Seizure  Date Last Reviewed: 10/1/2017    9056-5620 [employer]. [address] [place] [address], [place] [address]. All rights reserved. This information is not intended as a substitute for professional medical care. Always follow your healthcare professional's instructions.                Follow-ups after your visit        Your next 10 appointments already scheduled     May 23, 2018  2:00 PM CDT   Diabetic Education with [place] [facility]  RESOURCE   [facility] ([facility])    [address] [facility] [address]   [phone]            Jun 28, 2018 11:20 AM CDT   LAB with LAB FIRST FLOOR [facility] ([facility])    [address] [place] [id number] [phone]           Please do not eat 10-12 hours before your appointment if you are coming in fasting for labs on lipids, cholesterol, or glucose (sugar). This does not apply to pregnant women. Water, hot tea and black coffee (with nothing added) are okay. Do not drink other fluids, diet soda or chew gum.            Jun 28, 2018 11:40 AM CDT   Return Visit with Jeremiah Perdomo MD   [facility])    [address] [place] [id number] [phone]            Jul 10, 2018  9:30 AM CDT   LAB with LAB FIRST FLOOR [facility] ([facility])    [address] [place] [id number] [phone]           Please do not eat 10-12 hours before your appointment if you are coming in fasting for labs on lipids, cholesterol, or glucose (sugar). This does not apply to pregnant women. Water, hot tea and black coffee (with nothing added) are okay. Do not drink other fluids, diet soda or chew gum.            Jul 10, 2018 10:00 AM CDT   Return Visit with Sánchez Dias MD   [facility])    [address] [place] [id number] [phone]              Who to contact     If you have questions or need follow up information about today's clinic visit or your schedule please contact [facility] directly at [phone].  Normal or non-critical lab and imaging results will be communicated to you by MyChart, letter or phone within 4 business days after the clinic has received the results. If you do not hear from us within 7 days, please  "contact the clinic through [employer] or phone. If you have a critical or abnormal lab result, we will notify you by phone as soon as possible.  Submit refill requests through [employer] or call your pharmacy and they will forward the refill request to us. Please allow 3 business days for your refill to be completed.          Additional Information About Your Visit        [employer] Information     [employer] lets you send messages to your doctor, view your test results, renew your prescriptions, schedule appointments and more. To sign up, go to [email or web address] . Click on \"Log in\" on the left side of the screen, which will take you to the Welcome page. Then click on \"Sign up Now\" on the right side of the page.     You will be asked to enter the access code listed below, as well as some personal information. Please follow the directions to create your username and password.     Your access code is: [email or web address]  Expires: 2018  3:08 PM     Your access code will  in 90 days. If you need help or a new code, please call your [facility] clinic or [phone].        Care EveryWhere ID     This is your Care EveryWhere ID. This could be used by other organizations to access your [facility] medical records  [id number]        Your Vitals Were     Pulse Temperature Respirations Height BMI (Body Mass Index)       94 98.9  F (37.2  C) (Tympanic) 20 5' 4.5\" (1.638 m) 32.62 kg/m2        Blood Pressure from Last 3 Encounters:   18 118/57   18 113/67   18 128/72    Weight from Last 3 Encounters:   18 193 lb (87.5 kg)   18 189 lb 1.6 oz (85.8 kg)   18 198 lb (89.8 kg)              Today, you had the following     No orders found for display         Today's Medication Changes          These changes are accurate as of 18  3:22 PM.  If you have any questions, ask your nurse or doctor.               Start taking these medicines.        Dose/Directions    neomycin-polymyxin-hydrocortisone " 3.5-09437-7 otic suspension   Commonly known as:  CORTISPORIN   Used for:  Acute otitis externa of right ear, unspecified type        Dose:  4 drop   Place 4 drops into the right ear 4 times daily for 7 days   Quantity:  6 mL   Refills:  0            Where to get your medicines      These medications were sent to [place] [facility] [place] - [address]  [address], [place] [address]    Hours:  [place] [employer] [employer] Phone:  [phone]     neomycin-polymyxin-hydrocortisone 3.5-89074-6 otic suspension                Primary Care Provider Office Phone # Fax #    Jaleesa Velasquez, APRN -[phone] [address] [facility] [address]        Equal Access to Services     FANG HAY : Elsa hugoo Sotrace, waaxda luqadaha, qaybta kaalmada adeegyada, aaron echeverria . So [facility] [phone].    ATENCIÓN: Si habla español, tiene a gil disposición servicios gratuitos de asistencia lingüística. Carlo al [phone].    We comply with applicable federal civil rights laws and [place] laws. We do not discriminate on the basis of race, color, national origin, age, disability, sex, sexual orientation, or gender identity.            Thank you!     Thank you for choosing [facility]  for your care. Our goal is always to provide you with excellent care. Hearing back from our patients is one way we can continue to improve our services. Please take a few minutes to complete the written survey that you may receive in the mail after your visit with us. Thank you!             Your Updated Medication List - Protect others around you: Learn how to safely use, store and throw away your medicines at www.disposemymeds.org.          This list is accurate as of 5/1/18  3:22 PM.  Always use your most recent med list.                   Brand Name Dispense Instructions for use Diagnosis    ABILIFY 30 MG tablet   Generic drug:   ARIPiprazole      Take 30 mg by mouth daily        albuterol 108 (90 Base) MCG/ACT Inhaler    PROAIR HFA/PROVENTIL HFA/VENTOLIN HFA    1 Inhaler    Inhale 2 puffs into the lungs every 6 hours as needed for shortness of breath / dyspnea or wheezing    Mild intermittent asthma with acute exacerbation       amLODIPine 5 MG tablet    NORVASC    90 tablet    Take 1 tablet (5 mg) by mouth daily    Essential hypertension       blood glucose monitoring lancets     100 each    Use to test blood sugar 3 times daily    Type 2 diabetes mellitus with stage 3 chronic kidney disease, with long-term current use of insulin (H)       blood glucose monitoring test strip    no brand specified    100 each    1 strip by In Vitro route 3 times daily    Type 2 diabetes mellitus with stage 3 chronic kidney disease, with long-term current use of insulin (H)       Clozapine 200 MG tablet    CLOZARIL     Take 200 mg by mouth 2 times daily        dulaglutide 1.5 MG/0.5ML pen    TRULICITY    6 mL    Inject 1.5 mg Subcutaneous every 7 days    Type 2 diabetes mellitus with stage 3 chronic kidney disease, without long-term current use of insulin (H)       FLUoxetine 20 MG capsule    PROzac     Take 20 mg by mouth daily        insulin glargine 100 UNIT/ML injection    LANTUS SOLOSTAR    12 mL    Inject 25 units daily, if BG running low, or less then 100-120, decrease to 24 units    Type 2 diabetes mellitus with stage 3 chronic kidney disease, with long-term current use of insulin (H)       lisinopril 10 MG tablet    PRINIVIL/ZESTRIL    90 tablet    Take 0.5 tablets (5 mg) by mouth daily    Essential hypertension       montelukast 10 MG tablet    SINGULAIR    90 tablet    Take 1 tablet (10 mg) by mouth At Bedtime    Mild intermittent asthma with acute exacerbation       neomycin-polymyxin-hydrocortisone 3.5-47556-9 otic suspension    CORTISPORIN    6 mL    Place 4 drops into the right ear 4 times daily for 7 days    Acute otitis externa of right ear,  unspecified type       omeprazole 20 MG CR capsule    priLOSEC    60 capsule    Take 1 capsule (20 mg) by mouth daily 1 capsule bid    Gastroesophageal reflux disease without esophagitis       ranitidine 300 MG tablet    ZANTAC    90 tablet    Take 1 tablet (300 mg) by mouth At Bedtime    Gastroesophageal reflux disease without esophagitis       ULTILET SHARPS CONTAINER 1QT Misc     1 each    1 Device as needed    Type 2 diabetes mellitus with stage 3 chronic kidney disease, without long-term current use of insulin (H)

## 2018-05-01 NOTE — LETTER
My Depression Action Plan  Name: Divina Delgadillo   Date of Birth 1985  Date: 5/1/2018    My doctor: Jaleesa Velasquez   My clinic: [facility]  [address] [address]  [phone]          GREEN    ZONE   Good Control    What it looks like:     Things are going generally well. You have normal up s and down s. You may even feel depressed from time to time, but bad moods usually last less than a day.   What you need to do:  1. Continue to care for yourself (see self care plan)  2. Check your depression survival kit and update it as needed  3. Follow your physician s recommendations including any medication.  4. Do not stop taking medication unless you consult with your physician first.           YELLOW         ZONE Getting Worse    What it looks like:     Depression is starting to interfere with your life.     It may be hard to get out of bed; you may be starting to isolate yourself from others.    Symptoms of depression are starting to last most all day and this has happened for several days.     You may have suicidal thoughts but they are not constant.   What you need to do:     1. Call your care team, your response to treatment will improve if you keep your care team informed of your progress. Yellow periods are signs an adjustment may need to be made.     2. Continue your self-care, even if you have to fake it!    3. Talk to someone in your support network    4. Open up your depression survival kit           RED    ZONE Medical Alert - Get Help    What it looks like:     Depression is seriously interfering with your life.     You may experience these or other symptoms: You can t get out of bed most days, can t work or engage in other necessary activities, you have trouble taking care of basic hygiene, or basic responsibilities, thoughts of suicide or death that will not go away, self-injurious behavior.     What you need to do:  1. Call your care team  and request a same-day appointment. If they are not available (weekends or after hours) call your local crisis line, emergency room or 911.            Depression Self Care Plan / Survival Kit    Self-Care for Depression  Here s the deal. Your body and mind are really not as separate as most people think.  What you do and think affects how you feel and how you feel influences what you do and think. This means if you do things that people who feel good do, it will help you feel better.  Sometimes this is all it takes.  There is also a place for medication and therapy depending on how severe your depression is, so be sure to consult with your medical provider and/ or Behavioral Health Consultant if your symptoms are worsening or not improving.     In order to better manage my stress, I will:    Exercise  Get some form of exercise, every day. This will help reduce pain and release endorphins, the  feel good  chemicals in your brain. This is almost as good as taking antidepressants!  This is not the same as joining a gym and then never going! (they count on that by the way ) It can be as simple as just going for a walk or doing some gardening, anything that will get you moving.      Hygiene   Maintain good hygiene (Get out of bed in the morning, Make your bed, Brush your teeth, Take a shower, and Get dressed like you were going to work, even if you are unemployed).  If your clothes don't fit try to get ones that do.    Diet  I will strive to eat foods that are good for me, drink plenty of water, and avoid excessive sugar, caffeine, alcohol, and other mood-altering substances.  Some foods that are helpful in depression are: complex carbohydrates, B vitamins, flaxseed, fish or fish oil, fresh fruits and vegetables.    Psychotherapy  I agree to participate in Individual Therapy (if recommended).    Medication  If prescribed medications, I agree to take them.  Missing doses can result in serious side effects.  I understand  that drinking alcohol, or other illicit drug use, may cause potential side effects.  I will not stop my medication abruptly without first discussing it with my provider.    Staying Connected With Others  I will stay in touch with my friends, family members, and my primary care provider/team.    Use your imagination  Be creative.  We all have a creative side; it doesn t matter if it s oil painting, sand castles, or mud pies! This will also kick up the endorphins.    Witness Beauty  (AKA stop and smell the roses) Take a look outside, even in mid-winter. Notice colors, textures. Watch the squirrels and birds.     Service to others  Be of service to others.  There is always someone else in need.  By helping others we can  get out of ourselves  and remember the really important things.  This also provides opportunities for practicing all the other parts of the program.    Humor  Laugh and be silly!  Adjust your TV habits for less news and crime-drama and more comedy.    Control your stress  Try breathing deep, massage therapy, biofeedback, and meditation. Find time to relax each day.     My support system    Clinic Contact:  Phone number:    Contact 1:  Phone number:    Contact 2:  Phone number:    Rastafarian/:  Phone number:    Therapist:  Phone number:    Local crisis center:    Phone number:    Other community support:  Phone number:

## 2018-05-01 NOTE — PATIENT INSTRUCTIONS
1.  Take ear drops as directed.    2.  Follow-up with a phone call on Friday if symptoms are not resolving.      Thank you for choosing [facility].  You may be receiving a survey in the mail from [employer] regarding your visit today.  Please take a few minutes to complete and return the survey to let us know how we are doing.      If you have questions or concerns, please contact us via [employer] or you can contact your care team at [phone].    Our Clinic hours are:  Monday 6:40 am  to 7:00 pm  Tuesday -Friday 6:40 am to 5:00 pm    The [place] outpatient lab hours are:  Monday - Friday 6:10 am to 4:45 pm  Saturdays 7:00 am to 11:00 am  Appointments are required, call [phone]    If you have clinical questions after hours or would like to schedule an appointment,  call the clinic at [phone].    External Ear Infection (Adult)    External otitis (also called  swimmer s ear ) is an infection in the ear canal. It is often caused by bacteria or fungus. It can occur a few days after water gets trapped in the ear canal (from swimming or bathing). It can also occur after cleaning too deeply in the ear canal with a cotton swab or other object. Sometimes, hair care products get into the ear canal and cause this problem.  Symptoms can include pain, fever, itching, redness, drainage, or swelling of the ear canal. Temporary hearing loss may also occur.  Home care    Do not try to clean the ear canal. This can push pus and bacteria deeper into the canal.    Use prescribed ear drops as directed. These help reduce swelling and fight the infection. If an ear wick was placed in the ear canal, apply drops right onto the end of the wick. The wick will draw the medicine into the ear canal even if it is swollen closed.    A cotton ball may be loosely placed in the outer ear to absorb any drainage.    You may use acetaminophen or ibuprofen to control pain, unless another medicine was prescribed. Note: If you have  chronic liver or kidney disease or ever had a stomach ulcer or GI bleeding, talk to your healthcare provider before taking any of these medicines.    Do not allow water to get into your ear when bathing. Also, don't swim until the infection has cleared.  Prevention    Keep your ears dry. This helps lower the risk of infection. Dry your ears with a towel or hair dryer after getting wet. Also, use ear plugs when swimming.    Do not stick any objects in the ear to remove wax.    If you feel water trapped in your ear, use ear drops right away. You can get these drops over the counter at most drugstores. They work by removing water from the ear canal.  Follow-up care  Follow up with your healthcare provider in 1 week, or as advised.  When to seek medical advice  Call your healthcare provider right away if any of these occur:    Ear pain becomes worse or doesn t improve after 3 days of treatment    Redness or swelling of the outer ear occurs or gets worse    Headache    Painful or stiff neck    Drowsiness or confusion    Fever of 100.4 F (38 C) or higher, or as directed by your healthcare provider    Seizure  Date Last Reviewed: 10/1/2017    6657-3580 [employer]. [address] [place] [address], [place] [address]. All rights reserved. This information is not intended as a substitute for professional medical care. Always follow your healthcare professional's instructions.

## 2018-05-01 NOTE — NURSING NOTE
"Chief Complaint   Patient presents with     Ear Problem     Pt states that she has been having pain, pressure, yellow discharge and lack of hearing out of her Right ear x 6 days. No other sx noted.       Initial /57  Pulse 94  Temp 98.9  F (37.2  C) (Tympanic)  Resp 20  Ht 5' 4.5\" (1.638 m)  Wt 193 lb (87.5 kg)  BMI 32.62 kg/m2 Estimated body mass index is 32.62 kg/(m^2) as calculated from the following:    Height as of this encounter: 5' 4.5\" (1.638 m).    Weight as of this encounter: 193 lb (87.5 kg).  Medication Reconciliation: complete    "

## 2018-05-01 NOTE — PROGRESS NOTES
SUBJECTIVE:   Divina Delgadillo is a 33 year old female who presents to clinic today for the following health issues:      Chief Complaint   Patient presents with     Ear Problem     Pt states that she has been having pain, pressure, yellow discharge and lack of hearing out of her Right ear x 6 days. No other sx noted.     No fevers, congestion, or cough.  Patient has not been swimming or had water in her ear.  Patient has decreased muffled hearing and pain with pressure in the right ear.  Patient states that she has had issues with her ears in the past.    Problem list and histories reviewed & adjusted, as indicated.  Additional history: none    Patient Active Problem List   Diagnosis     Esophageal reflux     non alcoholic fatty liver disease     HTN (hypertension)     HYPERLIPIDEMIA LDL GOAL <100     DVT (deep venous thrombosis) (H)     CKD (chronic kidney disease) stage 3, GFR 30-59 ml/min     Malignant neoplasm of pancreas, unspecified location of malignancy (H)     Type 2 diabetes mellitus with stage 3 chronic kidney disease, with long-term current use of insulin (H)     Bipolar disorder (H)     Cervical high risk HPV (human papillomavirus) test positive     IUD (intrauterine device) in place     Morbid obesity (H)     Mild intermittent asthma with acute exacerbation     Past Surgical History:   Procedure Laterality Date     ADRENALECTOMY       PANCREATECTOMY PARTIAL       SPLENECTOMY       SURGICAL HISTORY OF -   10/1/2000    Tympanoplasty     SURGICAL HISTORY OF -   10/1/2000    Tonsillectomy       Social History   Substance Use Topics     Smoking status: Current Every Day Smoker     Packs/day: 1.00     Years: 4.00     Types: Cigarettes     Smokeless tobacco: Never Used      Comment: little less than a pack a day      Alcohol use No     Family History   Problem Relation Age of Onset     Respiratory Mother      ASTHMA     Allergies Mother      Depression Mother      HEART DISEASE Father      HEART VALVE  REPLACEMENT     Hypertension Father      DIABETES Father      Depression Father      Respiratory Brother      Allergies Brother      Respiratory Sister      Allergies Sister      Depression Sister      Respiratory Sister      Allergies Sister      Depression Sister      KIDNEY DISEASE No family hx of          Current Outpatient Prescriptions   Medication Sig Dispense Refill     albuterol (PROAIR HFA/PROVENTIL HFA/VENTOLIN HFA) 108 (90 BASE) MCG/ACT Inhaler Inhale 2 puffs into the lungs every 6 hours as needed for shortness of breath / dyspnea or wheezing 1 Inhaler 3     amLODIPine (NORVASC) 5 MG tablet Take 1 tablet (5 mg) by mouth daily 90 tablet 1     ARIPiprazole (ABILIFY) 30 MG tablet Take 30 mg by mouth daily       blood glucose monitoring (NO BRAND SPECIFIED) test strip 1 strip by In Vitro route 3 times daily 100 each 11     blood glucose monitoring (SOFTCLIX) lancets Use to test blood sugar 3 times daily 100 each 11     Clozapine (CLOZARIL) 200 MG tablet Take 200 mg by mouth 2 times daily       dulaglutide (TRULICITY) 1.5 MG/0.5ML pen Inject 1.5 mg Subcutaneous every 7 days 6 mL 3     FLUoxetine (PROZAC) 20 MG capsule Take 20 mg by mouth daily       insulin glargine (LANTUS SOLOSTAR) 100 UNIT/ML injection Inject 25 units daily, if BG running low, or less then 100-120, decrease to 24 units 12 mL 2     lisinopril (PRINIVIL/ZESTRIL) 10 MG tablet Take 0.5 tablets (5 mg) by mouth daily 90 tablet 3     montelukast (SINGULAIR) 10 MG tablet Take 1 tablet (10 mg) by mouth At Bedtime 90 tablet 1     neomycin-polymyxin-hydrocortisone (CORTISPORIN) 3.5-96985-0 otic suspension Place 4 drops into the right ear 4 times daily for 7 days 6 mL 0     omeprazole (PRILOSEC) 20 MG CR capsule Take 1 capsule (20 mg) by mouth daily 1 capsule bid 60 capsule 5     ranitidine (ZANTAC) 300 MG tablet Take 1 tablet (300 mg) by mouth At Bedtime 90 tablet 1     ULTILET SHARPS CONTAINER 1QT MISC 1 Device as needed 1 each 11     Allergies  "  Allergen Reactions     Latex        Reviewed and updated as needed this visit by clinical staff  Allergies  Meds  Problems       Reviewed and updated as needed this visit by Provider  Allergies  Meds  Problems         ROS:  CONSTITUTIONAL: NEGATIVE for fever, chills, change in weight  ENT/MOUTH: POSITIVE for discharge from right ear, ear pain right and muffled hearing   ROS otherwise negative    OBJECTIVE:     /57  Pulse 94  Temp 98.9  F (37.2  C) (Tympanic)  Resp 20  Ht 5' 4.5\" (1.638 m)  Wt 193 lb (87.5 kg)  BMI 32.62 kg/m2  Body mass index is 32.62 kg/(m^2).  GENERAL: healthy, alert and no distress  HENT: normal cephalic/atraumatic, right ear: purulent drainage in canal and red and boggy canal, nose and mouth without ulcers or lesions, oropharynx clear and oral mucous membranes moist  NECK: no adenopathy and no asymmetry, masses, or scars  RESP: lungs clear to auscultation - no rales, rhonchi or wheezes  CV: regular rate and rhythm, normal S1 S2, no S3 or S4, no murmur, click or rub, no peripheral edema and peripheral pulses strong  PSYCH: mentation appears normal, affect normal/bright    Diagnostic Test Results:  none     ASSESSMENT/PLAN:     1. Acute otitis externa of right ear, unspecified type  Tx with topical ear drops.  Recommended that patient call on Friday to make a telephone appointment if symptoms persist.  If symptoms are not getting better will add antibiotic to treatment regimen.  - neomycin-polymyxin-hydrocortisone (CORTISPORIN) 3.5-69070-1 otic suspension; Place 4 drops into the right ear 4 times daily for 7 days  Dispense: 6 mL; Refill: 0    Patient Instructions     1.  Take ear drops as directed.    2.  Follow-up with a phone call on Friday if symptoms are not resolving.      Thank you for choosing [facility].  You may be receiving a survey in the mail from [employer] regarding your visit today.  Please take a few minutes to complete and return the survey to let us " know how we are doing.      If you have questions or concerns, please contact us via [employer] or you can contact your care team at [phone].    Our Clinic hours are:  Monday 6:40 am  to 7:00 pm  Tuesday -Friday 6:40 am to 5:00 pm    The [place] outpatient lab hours are:  Monday - Friday 6:10 am to 4:45 pm  Saturdays 7:00 am to 11:00 am  Appointments are required, call [phone]    If you have clinical questions after hours or would like to schedule an appointment,  call the clinic at [phone].    External Ear Infection (Adult)    External otitis (also called  swimmer s ear ) is an infection in the ear canal. It is often caused by bacteria or fungus. It can occur a few days after water gets trapped in the ear canal (from swimming or bathing). It can also occur after cleaning too deeply in the ear canal with a cotton swab or other object. Sometimes, hair care products get into the ear canal and cause this problem.  Symptoms can include pain, fever, itching, redness, drainage, or swelling of the ear canal. Temporary hearing loss may also occur.  Home care    Do not try to clean the ear canal. This can push pus and bacteria deeper into the canal.    Use prescribed ear drops as directed. These help reduce swelling and fight the infection. If an ear wick was placed in the ear canal, apply drops right onto the end of the wick. The wick will draw the medicine into the ear canal even if it is swollen closed.    A cotton ball may be loosely placed in the outer ear to absorb any drainage.    You may use acetaminophen or ibuprofen to control pain, unless another medicine was prescribed. Note: If you have chronic liver or kidney disease or ever had a stomach ulcer or GI bleeding, talk to your healthcare provider before taking any of these medicines.    Do not allow water to get into your ear when bathing. Also, don't swim until the infection has cleared.  Prevention    Keep your ears dry. This helps lower the risk of  infection. Dry your ears with a towel or hair dryer after getting wet. Also, use ear plugs when swimming.    Do not stick any objects in the ear to remove wax.    If you feel water trapped in your ear, use ear drops right away. You can get these drops over the counter at most drugstores. They work by removing water from the ear canal.  Follow-up care  Follow up with your healthcare provider in 1 week, or as advised.  When to seek medical advice  Call your healthcare provider right away if any of these occur:    Ear pain becomes worse or doesn t improve after 3 days of treatment    Redness or swelling of the outer ear occurs or gets worse    Headache    Painful or stiff neck    Drowsiness or confusion    Fever of 100.4 F (38 C) or higher, or as directed by your healthcare provider    Seizure  Date Last Reviewed: 10/1/2017    [id number] [employer]. [address] [place] [address]. All rights reserved. This information is not intended as a substitute for professional medical care. Always follow your healthcare professional's instructions.            Oliva Mir NP  [facility]

## 2018-05-10 ENCOUNTER — VIRTUAL VISIT (OUTPATIENT)
Dept: NURSING | Facility: CLINIC | Age: 33
End: 2018-05-10

## 2018-05-10 DIAGNOSIS — Z79.4 TYPE 2 DIABETES MELLITUS WITH STAGE 3 CHRONIC KIDNEY DISEASE, WITH LONG-TERM CURRENT USE OF INSULIN (H): Primary | ICD-10-CM

## 2018-05-10 DIAGNOSIS — N18.30 TYPE 2 DIABETES MELLITUS WITH STAGE 3 CHRONIC KIDNEY DISEASE, WITH LONG-TERM CURRENT USE OF INSULIN (H): Primary | ICD-10-CM

## 2018-05-10 DIAGNOSIS — E11.22 TYPE 2 DIABETES MELLITUS WITH STAGE 3 CHRONIC KIDNEY DISEASE, WITH LONG-TERM CURRENT USE OF INSULIN (H): Primary | ICD-10-CM

## 2018-05-10 PROCEDURE — 99207 ZZC HEALTH COACHING, NO CHARGE: CPT

## 2018-05-10 NOTE — MR AVS SNAPSHOT
After Visit Summary   5/10/2018    Divina Delgadillo    MRN: [id number]           Patient Information     Date Of Birth          1985        Visit Information        Provider Department      5/10/2018 11:00 AM HEALTH  - REGION 3 [facility] [place]        Today's Diagnoses     Type 2 diabetes mellitus with stage 3 chronic kidney disease, with long-term current use of insulin (H)    -  1       Follow-ups after your visit        Your next 10 appointments already scheduled     May 23, 2018  2:00 PM CDT   Diabetic Education with [place] [facility]   [place] [facility] [place] ([facility])    [address] [facility] [address]   [phone]            Jun 28, 2018 11:20 AM CDT   LAB with LAB FIRST FLOOR Critical access hospital ([facility])    [address] [place] [address]   [phone]           Please do not eat 10-12 hours before your appointment if you are coming in fasting for labs on lipids, cholesterol, or glucose (sugar). This does not apply to pregnant women. Water, hot tea and black coffee (with nothing added) are okay. Do not drink other fluids, diet soda or chew gum.            Jun 28, 2018 11:40 AM CDT   Return Visit with Jeremiah Perdomo MD   [facility] ([facility])    [address] [place] [address]   [phone]            Jul 10, 2018  9:30 AM CDT   LAB with LAB FIRST FLOOR Critical access hospital ([facility])    [address] [place] [address]   [phone]           Please do not eat 10-12 hours before your appointment if you are coming in fasting for labs on lipids, cholesterol, or glucose (sugar). This does not apply to pregnant women. Water, hot tea and black coffee (with nothing added) are okay. Do not drink other fluids, diet soda or chew gum.            Jul 10, 2018 10:00 AM CDT   Return Visit  "with Sánchez Dias MD   [facility] ([facility])    [address] [place] [address] [phone]              Who to contact     If you have questions or need follow up information about today's clinic visit or your schedule please contact [facility] directly at [phone].  Normal or non-critical lab and imaging results will be communicated to you by MyChart, letter or phone within 4 business days after the clinic has received the results. If you do not hear from us within 7 days, please contact the clinic through KartMehart or phone. If you have a critical or abnormal lab result, we will notify you by phone as soon as possible.  Submit refill requests through [employer] or call your pharmacy and they will forward the refill request to us. Please allow 3 business days for your refill to be completed.          Additional Information About Your Visit        KartMeharWeLink Information     Highcon lets you send messages to your doctor, view your test results, renew your prescriptions, schedule appointments and more. To sign up, go to [email or web address] . Click on \"Log in\" on the left side of the screen, which will take you to the Welcome page. Then click on \"Sign up Now\" on the right side of the page.     You will be asked to enter the access code listed below, as well as some personal information. Please follow the directions to create your username and password.     Your access code is: [email or web address]  Expires: 2018  3:08 PM     Your access code will  in 90 days. If you need help or a new code, please call your [facility] or [phone].        Care EveryWhere ID     This is your Care EveryWhere ID. This could be used by other organizations to access your [place] medical records  [id number]         Blood Pressure from Last 3 Encounters:   18 118/57   18 113/67   18 128/72    Weight from Last 3 Encounters:   18 " 193 lb (87.5 kg)   04/11/18 189 lb 1.6 oz (85.8 kg)   02/21/18 198 lb (89.8 kg)              Today, you had the following     No orders found for display       Primary Care Provider Office Phone # Fax #    VERONIQUE Bledsoe [phone] [address] [facility] [address]        Equal Access to Services     FANG HAY : Hadii aad ku hadasho Soomaali, waaxda luqadaha, qaybta kaalmada adeegyada, waxay idiin hayaan adeeg kharash laadilene . So [facility] [phone].    ATENCIÓN: Si maliala espallen, tiene a gil disposición servicios gratuitos de asistencia lingüística. Llame al [phone].    We comply with applicable federal civil rights laws and [place] laws. We do not discriminate on the basis of race, color, national origin, age, disability, sex, sexual orientation, or gender identity.            Thank you!     Thank you for choosing [facility]  for your care. Our goal is always to provide you with excellent care. Hearing back from our patients is one way we can continue to improve our services. Please take a few minutes to complete the written survey that you may receive in the mail after your visit with us. Thank you!             Your Updated Medication List - Protect others around you: Learn how to safely use, store and throw away your medicines at www.disposemymeds.org.          This list is accurate as of 5/10/18  1:58 PM.  Always use your most recent med list.                   Brand Name Dispense Instructions for use Diagnosis    ABILIFY 30 MG tablet   Generic drug:  ARIPiprazole      Take 30 mg by mouth daily        albuterol 108 (90 Base) MCG/ACT Inhaler    PROAIR HFA/PROVENTIL HFA/VENTOLIN HFA    1 Inhaler    Inhale 2 puffs into the lungs every 6 hours as needed for shortness of breath / dyspnea or wheezing    Mild intermittent asthma with acute exacerbation       amLODIPine 5 MG tablet    NORVASC    90 tablet    Take 1 tablet (5 mg) by mouth daily    Essential  hypertension       blood glucose monitoring lancets     100 each    Use to test blood sugar 3 times daily    Type 2 diabetes mellitus with stage 3 chronic kidney disease, with long-term current use of insulin (H)       blood glucose monitoring test strip    no brand specified    100 each    1 strip by In Vitro route 3 times daily    Type 2 diabetes mellitus with stage 3 chronic kidney disease, with long-term current use of insulin (H)       Clozapine 200 MG tablet    CLOZARIL     Take 200 mg by mouth 2 times daily        dulaglutide 1.5 MG/0.5ML pen    TRULICITY    6 mL    Inject 1.5 mg Subcutaneous every 7 days    Type 2 diabetes mellitus with stage 3 chronic kidney disease, without long-term current use of insulin (H)       FLUoxetine 20 MG capsule    PROzac     Take 20 mg by mouth daily        insulin glargine 100 UNIT/ML injection    LANTUS SOLOSTAR    12 mL    Inject 25 units daily, if BG running low, or less then 100-120, decrease to 24 units    Type 2 diabetes mellitus with stage 3 chronic kidney disease, with long-term current use of insulin (H)       lisinopril 10 MG tablet    PRINIVIL/ZESTRIL    90 tablet    Take 0.5 tablets (5 mg) by mouth daily    Essential hypertension       montelukast 10 MG tablet    SINGULAIR    90 tablet    Take 1 tablet (10 mg) by mouth At Bedtime    Mild intermittent asthma with acute exacerbation       omeprazole 20 MG CR capsule    priLOSEC    60 capsule    Take 1 capsule (20 mg) by mouth daily 1 capsule bid    Gastroesophageal reflux disease without esophagitis       ranitidine 300 MG tablet    ZANTAC    90 tablet    Take 1 tablet (300 mg) by mouth At Bedtime    Gastroesophageal reflux disease without esophagitis       ULTILET SHARPS CONTAINER 1QT Misc     1 each    1 Device as needed    Type 2 diabetes mellitus with stage 3 chronic kidney disease, without long-term current use of insulin (H)

## 2018-05-10 NOTE — PROGRESS NOTES
May 10, 2018    [facility]  [address]  [place] [address]  [phone]  Health Coaching Progress Note    Patient Name: Divina Delgadillo Date: May 10, 2018      Session Length: 35      DATA    PRM Master Survey Scores Reviewed: Yes    Core Healthy Days Survey:    Would you say that in general your health is: : Good    EMY Score (Last Two) 5/10/2018   EMY Raw Score 35   Activation Score 72.1   EMY Level 3       PHQ-2 Score 5/10/2018 12/7/2017   PHQ-2 Total Score Interpretation - Positive if 3 or more points; Administer PHQ-9 if positive 1 1       PHQ-9 SCORE 5/18/2017 12/7/2017   Total Score 2 12       Treatment Objective(s) Addressed in This Session:  Target Behavior(s): diet/weight loss and disease management/lifestyle changes increasing exercise    Current Stressors / Issues:  Divina reports no stressors or issues today.    What Patient Does Well:   Divina shares that she has actually started working on her goals the past 2 weeks and is doing good so far.   Previous Successes:   Divina states that she recently realized that she has to change her lifestyle for her health and that she has to exercise regularly and eat better.  Areas in Need of Improvement:   Divina notes accountability as the only area in need of improvement at this time and so we talk about this further and Divina sets her initial goals (that she has started working on) and set up a follow up visit to check in to see how things are going.  Barriers to Change:   Divina says she is not currently experiencing any barriers.  Reasons for Change:   Divina wants to make changes for her health and self-esteem.  Plan/Goal for the Next 4 Weeks:     GOAL #1: Begin doing something active or exercising most days  GOAL #1 Progress Toward Goal: 0%  GOAL #2: Continue increasing healty eating habits-increase eating breakfast (days),, increase vegetable intake, decrease protions, and monitor  carbs  GOAL #2 Progress Toward Goal: 0%    Intervention:  Motivational Interviewing    MI Intervention: Expressed Empathy/Understanding, Supported Autonomy, Collaboration, Evocation, Permission to raise concern or advise, Open-ended questions, Reflections: simple and complex, Rolled with resistance: Emphasized patient autonomy and Simple reflection, Importance Scale (1-10) 10 Confidence Scale (1-10) 7  and Reframe     Change Talk Expressed by the Patient: Desire to change Ability to change Reasons to change Need to change Committment to change Activation Taking steps    Provider Response to Change Talk: E - Evoked more info from patient about behavior change, A - Affirmed patient's thoughts, decisions, or attempts at behavior change, R - Reflected patient's change talk and S - Summarized patient's change talk statements    Assessment / Progress on Treatment Objective(s) / Homework:    New Objective established this session - ACTION (Actively working towards change); Intervened by reinforcing change plan / affirming steps taken         Plan: (Homework, other):  Patient was encouraged to continue to seek condition-related information and education, as well as schedule a follow up appointment with the Health  in 2 weeks. Patient has set self-identified goals and will monitor progress until the next appointment.  Next visit scheduled for May 24 at 10:30AM.      Mahin Penny

## 2018-05-18 DIAGNOSIS — Z79.899 MEDICATION MANAGEMENT: Primary | ICD-10-CM

## 2018-05-18 DIAGNOSIS — F25.0 SCHIZOAFFECTIVE DISORDER, BIPOLAR TYPE (H): ICD-10-CM

## 2018-05-18 PROCEDURE — 36415 COLL VENOUS BLD VENIPUNCTURE: CPT | Performed by: CLINICAL NURSE SPECIALIST

## 2018-05-18 PROCEDURE — 85025 COMPLETE CBC W/AUTO DIFF WBC: CPT | Performed by: CLINICAL NURSE SPECIALIST

## 2018-05-19 LAB
BASOPHILS # BLD AUTO: 0.1 10E9/L (ref 0–0.2)
BASOPHILS NFR BLD AUTO: 0.6 %
DIFFERENTIAL METHOD BLD: ABNORMAL
EOSINOPHIL # BLD AUTO: 0.2 10E9/L (ref 0–0.7)
EOSINOPHIL NFR BLD AUTO: 1.1 %
ERYTHROCYTE [DISTWIDTH] IN BLOOD BY AUTOMATED COUNT: 13.6 % (ref 10–15)
HCT VFR BLD AUTO: 39.1 % (ref 35–47)
HGB BLD-MCNC: 12.7 G/DL (ref 11.7–15.7)
IMM GRANULOCYTES # BLD: 0.2 10E9/L (ref 0–0.4)
IMM GRANULOCYTES NFR BLD: 0.9 %
LYMPHOCYTES # BLD AUTO: 5.4 10E9/L (ref 0.8–5.3)
LYMPHOCYTES NFR BLD AUTO: 30.1 %
MCH RBC QN AUTO: 30.8 PG (ref 26.5–33)
MCHC RBC AUTO-ENTMCNC: 32.5 G/DL (ref 31.5–36.5)
MCV RBC AUTO: 95 FL (ref 78–100)
MONOCYTES # BLD AUTO: 1.3 10E9/L (ref 0–1.3)
MONOCYTES NFR BLD AUTO: 7.3 %
NEUTROPHILS # BLD AUTO: 10.8 10E9/L (ref 1.6–8.3)
NEUTROPHILS NFR BLD AUTO: 60 %
PLATELET # BLD AUTO: 382 10E9/L (ref 150–450)
PLATELET # BLD EST: ABNORMAL 10*3/UL
RBC # BLD AUTO: 4.12 10E12/L (ref 3.8–5.2)
RBC MORPH BLD: NORMAL
VARIANT LYMPHS BLD QL SMEAR: PRESENT
WBC # BLD AUTO: 18 10E9/L (ref 4–11)

## 2018-05-23 ENCOUNTER — OFFICE VISIT (OUTPATIENT)
Dept: FAMILY MEDICINE | Facility: CLINIC | Age: 33
End: 2018-05-23
Payer: MEDICARE

## 2018-05-23 ENCOUNTER — ALLIED HEALTH/NURSE VISIT (OUTPATIENT)
Dept: EDUCATION SERVICES | Facility: CLINIC | Age: 33
End: 2018-05-23
Payer: MEDICARE

## 2018-05-23 VITALS
WEIGHT: 190.6 LBS | HEIGHT: 65 IN | DIASTOLIC BLOOD PRESSURE: 65 MMHG | TEMPERATURE: 99.5 F | HEART RATE: 97 BPM | BODY MASS INDEX: 31.75 KG/M2 | SYSTOLIC BLOOD PRESSURE: 122 MMHG

## 2018-05-23 VITALS — WEIGHT: 188.5 LBS | BODY MASS INDEX: 31.86 KG/M2

## 2018-05-23 DIAGNOSIS — N18.30 TYPE 2 DIABETES MELLITUS WITH STAGE 3 CHRONIC KIDNEY DISEASE, WITH LONG-TERM CURRENT USE OF INSULIN (H): ICD-10-CM

## 2018-05-23 DIAGNOSIS — Z79.4 TYPE 2 DIABETES MELLITUS WITH STAGE 3 CHRONIC KIDNEY DISEASE, WITH LONG-TERM CURRENT USE OF INSULIN (H): ICD-10-CM

## 2018-05-23 DIAGNOSIS — R31.9 HEMATURIA, UNSPECIFIED TYPE: Primary | ICD-10-CM

## 2018-05-23 DIAGNOSIS — E11.22 TYPE 2 DIABETES MELLITUS WITH STAGE 3 CHRONIC KIDNEY DISEASE, WITH LONG-TERM CURRENT USE OF INSULIN (H): ICD-10-CM

## 2018-05-23 LAB
ALBUMIN UR-MCNC: NEGATIVE MG/DL
APPEARANCE UR: CLEAR
BACTERIA #/AREA URNS HPF: ABNORMAL /HPF
BILIRUB UR QL STRIP: NEGATIVE
COLOR UR AUTO: YELLOW
GLUCOSE UR STRIP-MCNC: NEGATIVE MG/DL
HGB UR QL STRIP: NEGATIVE
KETONES UR STRIP-MCNC: NEGATIVE MG/DL
LEUKOCYTE ESTERASE UR QL STRIP: NEGATIVE
NITRATE UR QL: NEGATIVE
NON-SQ EPI CELLS #/AREA URNS LPF: ABNORMAL /LPF
PH UR STRIP: 5.5 PH (ref 5–7)
RBC #/AREA URNS AUTO: ABNORMAL /HPF
SOURCE: ABNORMAL
SP GR UR STRIP: >1.03 (ref 1–1.03)
UROBILINOGEN UR STRIP-ACNC: 0.2 EU/DL (ref 0.2–1)
WBC #/AREA URNS AUTO: ABNORMAL /HPF

## 2018-05-23 PROCEDURE — G0108 DIAB MANAGE TRN  PER INDIV: HCPCS

## 2018-05-23 PROCEDURE — 99213 OFFICE O/P EST LOW 20 MIN: CPT | Performed by: NURSE PRACTITIONER

## 2018-05-23 PROCEDURE — 81001 URINALYSIS AUTO W/SCOPE: CPT | Performed by: NURSE PRACTITIONER

## 2018-05-23 ASSESSMENT — ANXIETY QUESTIONNAIRES
1. FEELING NERVOUS, ANXIOUS, OR ON EDGE: NOT AT ALL
3. WORRYING TOO MUCH ABOUT DIFFERENT THINGS: NOT AT ALL
6. BECOMING EASILY ANNOYED OR IRRITABLE: SEVERAL DAYS
5. BEING SO RESTLESS THAT IT IS HARD TO SIT STILL: NOT AT ALL
7. FEELING AFRAID AS IF SOMETHING AWFUL MIGHT HAPPEN: SEVERAL DAYS
2. NOT BEING ABLE TO STOP OR CONTROL WORRYING: NOT AT ALL
4. TROUBLE RELAXING: NOT AT ALL
IF YOU CHECKED OFF ANY PROBLEMS ON THIS QUESTIONNAIRE, HOW DIFFICULT HAVE THESE PROBLEMS MADE IT FOR YOU TO DO YOUR WORK, TAKE CARE OF THINGS AT HOME, OR GET ALONG WITH OTHER PEOPLE: NOT DIFFICULT AT ALL
GAD7 TOTAL SCORE: 2

## 2018-05-23 NOTE — PATIENT INSTRUCTIONS
Decrease your Lantus 4 units  from 26 to 22 units to help prevent any low blood sugars     Keep up the great work!   Your numbers are down!    Start checking a few before meal readings to see how low you area going.  Call right away with any numbers below 80mg/dL     Always keep a fast acting carb source on you while you are out biking/walking etc. (at all times)     Dolly Riley RD, LD, CDE  For Diabetes Education appointments call: [phone]   For Diabetes Education Related Questions call: [phone] or email: [email or web address]

## 2018-05-23 NOTE — PROGRESS NOTES
"  SUBJECTIVE:   Divina Delgadillo is a 33 year old female who presents to clinic today for the following health issues: blood in urine and mild pelvic cramping pain, she has an IUD and she is not sure if blood can be vaginal. She does not have any bleeding today, but noted small amount of blood in toilet and when wiping past 2 days. Denies flank pain, dysuria, urgency and frequency. No history of kidney stones, recent CT scan showed normal kidneys.     URINARY TRACT SYMPTOMS  Onset: 2 days     Description:   Painful urination (Dysuria): no   Blood in urine (Hematuria): YES  Delay in urine (Hesitency): no     Intensity: moderate    Progression of Symptoms:  same    Accompanying Signs & Symptoms:  Fever/chills: no   Flank pain no   Nausea and vomiting: no   Any vaginal symptoms: none  Abdominal/Pelvic Pain: YES    History:   History of frequent UTI's: no   History of kidney stones: no   Sexually Active: no   Possibility of pregnancy: No    Precipitating factors:   None     Therapies Tried and outcome: none       Problem list and histories reviewed & adjusted, as indicated.  Additional history: as documented    Labs reviewed in EPIC    Reviewed and updated as needed this visit by clinical staff  Tobacco  Allergies  Med Hx  Surg Hx  Fam Hx  Soc Hx      Reviewed and updated as needed this visit by Provider         ROS:  Constitutional, HEENT, cardiovascular, pulmonary, gi and gu systems are negative, except as otherwise noted.    OBJECTIVE:     /65  Pulse 97  Temp 99.5  F (37.5  C) (Tympanic)  Ht 5' 4.5\" (1.638 m)  Wt 190 lb 9.6 oz (86.5 kg)  BMI 32.21 kg/m2  Body mass index is 32.21 kg/(m^2).  GENERAL: healthy, alert and no distress  ABDOMEN: soft, nontender  BACK: no CVA tenderness, no paralumbar tenderness  PSYCH: mentation appears normal, affect normal/bright    Diagnostic Test Results:  Urinalysis - unremarkable    ASSESSMENT/PLAN:     1. Hematuria, unspecified type  -normal UA, reassured patient " that bleeding was likely intravaginal, she has an IUD, but occasional light spotting is normal, so she can still have light periods with IUD. She had similar spotting episode couple months ago.   - UA with Microscopic reflex to Culture    See Patient Instructions    VERONIQUE Spears [facility]

## 2018-05-23 NOTE — MR AVS SNAPSHOT
After Visit Summary   5/23/2018    Divina Delgadillo    MRN: [id number]           Patient Information     Date Of Birth          1985        Visit Information        Provider Department      5/23/2018 2:00 PM [place] [facility] [place]        Care Instructions    Decrease your Lantus 4 units  from 26 to 22 units to help prevent any low blood sugars     Keep up the great work!   Your numbers are down!    Start checking a few before meal readings to see how low you area going.  Call right away with any numbers below 80mg/dL     Always keep a fast acting carb source on you while you are out biking/walking etc. (at all times)     Dolly Riley RD, LD, CDE  For Diabetes Education appointments call: [phone]   For Diabetes Education Related Questions call: [phone] or email: [email or web address]                Follow-ups after your visit        Your next 10 appointments already scheduled     May 23, 2018  3:40 PM CDT   SHORT with VERONIQUE Bledsoe [facility] ([facility])    [address] [facility] [facility] [address]   [phone]            May 24, 2018 10:30 AM CDT   Telephone Visit with HEALTH  - REGION 3   [facility] ([facility])    [address] [place] [id number] [phone]           Note: this is not an onsite visit; there is no need to come to the facility.            Jun 26, 2018  3:00 PM CDT   Diabetic Education with [place] [facility] ED RESOURCE   [facility] ([facility])    [address] [facility] [address]   [phone]            Jun 28, 2018 11:20 AM CDT   LAB with LAB FIRST FLOOR [facility] ([facility])    [address] [place] [id number] [phone]           Please do not eat 10-12 hours before your appointment if you are coming in fasting  for labs on lipids, cholesterol, or glucose (sugar). This does not apply to pregnant women. Water, hot tea and black coffee (with nothing added) are okay. Do not drink other fluids, diet soda or chew gum.            Jun 28, 2018 11:40 AM CDT   Return Visit with Jeremiah Perdomo MD   [facility] ([facility])    [address] [place] [address]   [phone]            Jul 10, 2018  9:30 AM CDT   LAB with LAB FIRST FLOOR [facility] ([facility])    [address] [place] [address]   [phone]           Please do not eat 10-12 hours before your appointment if you are coming in fasting for labs on lipids, cholesterol, or glucose (sugar). This does not apply to pregnant women. Water, hot tea and black coffee (with nothing added) are okay. Do not drink other fluids, diet soda or chew gum.            Jul 10, 2018 10:00 AM CDT   Return Visit with Sánchez Dias MD   [facility] ([facility])    [address] [place] [address] [phone]              Who to contact     If you have questions or need follow up information about today's clinic visit or your schedule please contact [facility] [place] directly at [phone].  Normal or non-critical lab and imaging results will be communicated to you by Swapseehart, letter or phone within 4 business days after the clinic has received the results. If you do not hear from us within 7 days, please contact the clinic through Swapseehart or phone. If you have a critical or abnormal lab result, we will notify you by phone as soon as possible.  Submit refill requests through [employer] or call your pharmacy and they will forward the refill request to us. Please allow 3 business days for your refill to be completed.          Additional Information About Your Visit        [employer] Information     [employer] lets you send  "messages to your doctor, view your test results, renew your prescriptions, schedule appointments and more. To sign up, go to [email or web address] . Click on \"Log in\" on the left side of the screen, which will take you to the Welcome page. Then click on \"Sign up Now\" on the right side of the page.     You will be asked to enter the access code listed below, as well as some personal information. Please follow the directions to create your username and password.     Your access code is: [email or web address]  Expires: 2018  3:08 PM     Your access code will  in 90 days. If you need help or a new code, please call your [facility] clinic or [phone].        Care EveryWhere ID     This is your Care EveryWhere ID. This could be used by other organizations to access your [place] medical records  [id number]         Blood Pressure from Last 3 Encounters:   18 118/57   18 113/67   18 128/72    Weight from Last 3 Encounters:   18 87.5 kg (193 lb)   18 85.8 kg (189 lb 1.6 oz)   18 89.8 kg (198 lb)              Today, you had the following     No orders found for display       Primary Care Provider Office Phone # Fax #    Jaleesa Velasquez, APRN [phone] [address] [facility] [address]        Equal Access to Services     FANG HAY : Hadii juan manuel moreno hadasho Sochelleali, waaxda luqadaha, qaybta kaalmada adeegyada, aaron echeverria . So [facility] [phone].    ATENCIÓN: Si habla imelda, tiene a gil disposición servicios gratuitos de asistencia lingüística. Llame al [phone].    We comply with applicable federal civil rights laws and [place] laws. We do not discriminate on the basis of race, color, national origin, age, disability, sex, sexual orientation, or gender identity.            Thank you!     Thank you for choosing [facility]  for your care. Our goal is always to provide you with excellent care. " Hearing back from our patients is one way we can continue to improve our services. Please take a few minutes to complete the written survey that you may receive in the mail after your visit with us. Thank you!             Your Updated Medication List - Protect others around you: Learn how to safely use, store and throw away your medicines at www.disposemymeds.org.          This list is accurate as of 5/23/18  3:14 PM.  Always use your most recent med list.                   Brand Name Dispense Instructions for use Diagnosis    ABILIFY 30 MG tablet   Generic drug:  ARIPiprazole      Take 30 mg by mouth daily        albuterol 108 (90 Base) MCG/ACT Inhaler    PROAIR HFA/PROVENTIL HFA/VENTOLIN HFA    1 Inhaler    Inhale 2 puffs into the lungs every 6 hours as needed for shortness of breath / dyspnea or wheezing    Mild intermittent asthma with acute exacerbation       amLODIPine 5 MG tablet    NORVASC    90 tablet    Take 1 tablet (5 mg) by mouth daily    Essential hypertension       blood glucose monitoring lancets     100 each    Use to test blood sugar 3 times daily    Type 2 diabetes mellitus with stage 3 chronic kidney disease, with long-term current use of insulin (H)       blood glucose monitoring test strip    no brand specified    100 each    1 strip by In Vitro route 3 times daily    Type 2 diabetes mellitus with stage 3 chronic kidney disease, with long-term current use of insulin (H)       Clozapine 200 MG tablet    CLOZARIL     Take 200 mg by mouth 2 times daily        dulaglutide 1.5 MG/0.5ML pen    TRULICITY    6 mL    Inject 1.5 mg Subcutaneous every 7 days    Type 2 diabetes mellitus with stage 3 chronic kidney disease, without long-term current use of insulin (H)       FLUoxetine 20 MG capsule    PROzac     Take 20 mg by mouth daily        insulin glargine 100 UNIT/ML injection    LANTUS SOLOSTAR    12 mL    Inject 25 units daily, if BG running low, or less then 100-120, decrease to 24 units    Type 2  diabetes mellitus with stage 3 chronic kidney disease, with long-term current use of insulin (H)       lisinopril 10 MG tablet    PRINIVIL/ZESTRIL    90 tablet    Take 0.5 tablets (5 mg) by mouth daily    Essential hypertension       montelukast 10 MG tablet    SINGULAIR    90 tablet    Take 1 tablet (10 mg) by mouth At Bedtime    Mild intermittent asthma with acute exacerbation       omeprazole 20 MG CR capsule    priLOSEC    60 capsule    Take 1 capsule (20 mg) by mouth daily 1 capsule bid    Gastroesophageal reflux disease without esophagitis       ranitidine 300 MG tablet    ZANTAC    90 tablet    Take 1 tablet (300 mg) by mouth At Bedtime    Gastroesophageal reflux disease without esophagitis       ULTILET SHARPS CONTAINER 1QT Misc     1 each    1 Device as needed    Type 2 diabetes mellitus with stage 3 chronic kidney disease, without long-term current use of insulin (H)

## 2018-05-23 NOTE — PROGRESS NOTES
Diabetes Self Management Training: Follow-up Visit    Divina Delgadillo presents today for evaluation of glucose control related to Type 2 diabetes.  She is accompanied by self    Patient's diabetes management related comments/concerns: has had a good month with increased activity.  Patient would like this visit to be focused around the following diabetes-related behaviors and goals: Being Active and Monitoring    ASSESSMENT:  Patient Problem List reviewed for relevant medical history and current medical status.    Current Diabetes Management per Patient:  Taking diabetes medications?   yes:     Diabetes Medication(s)     Insulin Sig    insulin glargine (LANTUS SOLOSTAR) 100 UNIT/ML injection Inject 25 units daily, if BG running low, or less then 100-120, decrease to 24 units    Incretin Mimetic Agents (GLP-1 Receptor Agonists) Sig    dulaglutide (TRULICITY) 1.5 MG/0.5ML pen Inject 1.5 mg Subcutaneous every 7 days      Divina had accidentally been taking 26 units (her old dose)   Lantus 26-0-0-0    *Abbreviated insulin dose documentation key: Insulin Trade Name (zzufjefop-jtiid-thaoiu-bedtime) - i.e. Humalog 5-5-5-0 (Humalog 5 units at breakfast, 5 units at lunch, and 5 units at dinner).    Patient glucose self monitoring as follows: three times daily.   BG results:        BG values are: In goal  Patient's most recent is not meeting goal of <7.0  Lab Results   Component Value Date    A1C 7.3 04/11/2018    A1C 10.0 01/19/2018    A1C 9.6 12/07/2017    A1C 7.7 09/07/2017    A1C 7.8 06/15/2017       Nutrition:  Patient eats 3 meals per day    Physical Activity:    Biking around house or to town with house mate   [employer] - starting to ride bike there   [employer] - started a workout group 45 minutes squats & light weights   Arms worker is going with her     Diabetes Complications:  Acute Complications: At risk for hypoglycemia? yes  Frequency of hypoglycemia: feels shaky below 90mg/dL and sometimes treats with jolly  "ranchers  Patient carries a carbohydrate source with them regularly: Yes     Vitals:  Wt 85.5 kg (188 lb 8 oz)  BMI 31.86 kg/m2  Estimated body mass index is 31.86 kg/(m^2) as calculated from the following:    Height as of 5/1/18: 1.638 m (5' 4.5\").    Weight as of this encounter: 85.5 kg (188 lb 8 oz).     Wt Readings from Last 5 Encounters:   05/01/18 87.5 kg (193 lb)   04/11/18 85.8 kg (189 lb 1.6 oz)   02/21/18 89.8 kg (198 lb)   01/19/18 86.6 kg (191 lb)   01/16/18 89.8 kg (198 lb)     Last 3 BP:   BP Readings from Last 3 Encounters:   05/01/18 118/57   04/11/18 113/67   01/19/18 128/72       History   Smoking Status     Former Smoker     Packs/day: 1.00     Years: 4.00     Types: Cigarettes   Smokeless Tobacco     Never Used     Comment: little less than a pack a day        Labs:  Lab Results   Component Value Date    A1C 7.3 04/11/2018     Lab Results   Component Value Date     04/13/2018     Lab Results   Component Value Date     06/15/2017     HDL Cholesterol   Date Value Ref Range Status   06/15/2017 72 >49 mg/dL Final   ]  GFR Estimate   Date Value Ref Range Status   04/24/2018 38 (L) >60 mL/min/1.7m2 Final     Comment:     Non  GFR Calc     GFR Estimate If Black   Date Value Ref Range Status   04/24/2018 46 (L) >60 mL/min/1.7m2 Final     Comment:      GFR Calc     Lab Results   Component Value Date    CR 1.58 04/24/2018     No results found for: MICROALBUMIN    Socio/Economic Considerations:  Support system: family, spouse/significant other and housemates, arms worker, health coaching     Health Beliefs and Attitudes:   Patient Activation Measure Survey Score:  EMY Score (Last Two) 5/10/2018   EMY Raw Score 35   Activation Score 72.1   EMY Level 3     Stage of Change: ACTION (Actively working towards change)    Progress toward meeting diabetes-related behavioral goals:    GOALS % Met Goal   Healthy Eating     Physical Activity     Monitoring 0 (check pre " meals at least 3 times weekly x 5 weeks)   Medication Taking     Problem Solving     Healthy Coping     Risk Reduction         Diabetes knowledge and skills assessment:   Patient is knowledgeable in diabetes management concepts related to: Monitoring, Taking Medication and Problem Solving  Patient needs further education on the following diabetes management concepts: Healthy Eating, Being Active, Problem Solving, Reducing Risks and Healthy Coping  Barriers to Learning Assessment: No Barriers identified  Based on learning assessment above, most appropriate setting for further diabetes education would be: Individual setting.    INTERVENTION:  Weight is down around 5# from last visit.  Divina reports increased activity this month and continuing to work on diet.  Blood sugars improved and have started to decrease below 100mg/dL.   Had a pre meal at 74mg/dL after skipping breakfast. Feels shaky with numbers in the 80s/90s and is sometimes eating/treating.  To avoid extra calories and reduce potential for low blood sugars recommended decreasing insulin by 15% from 26 to 22 units.  Also activity is increasing which will reduce insulin needs.   Divina is motivated by the goal of trying to reduce and get off insulin.     Future plan - continue to increase activity and work towards weight loss ~ 1#/week.   Check pre meal BGs at times.  If catching any numbers below 80mg/dL, update DM ed triage right away and plan for a further decrease of Lantus.     Started Health coaching with Mahin 5/10 and has an appointment tomorrow 5/24.      Education provided today on:  AADE Self-Care Behaviors:  Healthy Eating: weight reduction  Being Active: relationship to blood glucose, describe appropriate activity program and precautions to take  Monitoring: log and interpret results, individual blood glucose targets and frequency of monitoring  Taking Medication: action of prescribed medication, side effects of prescribed medications and when to  take medications  Problem Solving: high blood glucose - causes, signs/symptoms, treatment and prevention, low blood glucose - causes, signs/symptoms, treatment and prevention, carrying a carbohydrate source at all times and when to call health care provider    Opportunities for ongoing education and support in diabetes-self management were discussed.  Pt verbalized understanding of concepts discussed and recommendations provided today.       Education Materials Provided:  No new materials provided today    PLAN:  See Patient Instructions for co-developed, patient-stated behavior change goals.  AVS printed and provided to patient today.    Check pre meal BGs at times.  If catching any numbers below 80mg/dL, update DM ed triage right away and plan for a further decrease of Lantus.    FOLLOW-UP:  Follow-up appointment scheduled on 06/26/2018.    Ongoing plan for education and support: Follow-up visit with diabetes educator in 6 weeks    Dolly Riley RD, DANIEL, CDE  Diabetes Education    Time Spent: 30 minutes  Encounter Type: Individual    Any diabetes medication dose changes were made via the CDE Protocol and Collaborative Practice Agreement with the patient's primary care provider.

## 2018-05-23 NOTE — MR AVS SNAPSHOT
After Visit Summary   5/23/2018    Divina Delgadillo    MRN: [id number]           Patient Information     Date Of Birth          1985        Visit Information        Provider Department      5/23/2018 3:40 PM Jaleesa Velasquez APRN CNP [facility]        Today's Diagnoses     Hematuria, unspecified type    -  1      Care Instructions    Urine is negative for infection  Very small amount of blood           Follow-ups after your visit        Your next 10 appointments already scheduled     May 24, 2018 10:30 AM CDT   Telephone Visit with HEALTH  - REGION 3   [facility] ([facility]    [address] [facility] [address]  [place] [address]   [phone]           Note: this is not an onsite visit; there is no need to come to the facility.            Jun 26, 2018  3:00 PM CDT   Diabetic Education with [place] DI[place] [facility]OURCE   [facility])    [address] [facility] [address]   [phone]            Jun 28, 2018 11:20 AM CDT   LAB with LAB FIRST FLOOR [facility])    [address] [place] [address] [phone]           Please do not eat 10-12 hours before your appointment if you are coming in fasting for labs on lipids, cholesterol, or glucose (sugar). This does not apply to pregnant women. Water, hot tea and black coffee (with nothing added) are okay. Do not drink other fluids, diet soda or chew gum.            Jun 28, 2018 11:40 AM CDT   Return Visit with Jeremiah Perdomo MD   [facility])    [address] [facility] [address]   [phone]            Jul 10, 2018  9:30 AM CDT   LAB with LAB FIRST FLOOR [facility])    [address] [address] [address] [phone]           Please do not eat 10-12  "hours before your appointment if you are coming in fasting for labs on lipids, cholesterol, or glucose (sugar). This does not apply to pregnant women. Water, hot tea and black coffee (with nothing added) are okay. Do not drink other fluids, diet soda or chew gum.            Jul 10, 2018 10:00 AM CDT   Return Visit with Sánchez Dias MD   [facility]    [address] [place] [address] [phone]              Who to contact     If you have questions or need follow up information about today's clinic visit or your schedule please contact [facility] directly at [phone].  Normal or non-critical lab and imaging results will be communicated to you by MyChart, letter or phone within 4 business days after the clinic has received the results. If you do not hear from us within 7 days, please contact the clinic through MyChart or phone. If you have a critical or abnormal lab result, we will notify you by phone as soon as possible.  Submit refill requests through [employer] or call your pharmacy and they will forward the refill request to us. Please allow 3 business days for your refill to be completed.          Additional Information About Your Visit        Care EveryWhere ID     This is your Care EveryWhere ID. This could be used by other organizations to access your [facility] medical records  [id number]        Your Vitals Were     Pulse Temperature Height BMI (Body Mass Index)          97 99.5  F (37.5  C) (Tympanic) 5' 4.5\" (1.638 m) 32.21 kg/m2         Blood Pressure from Last 3 Encounters:   05/23/18 122/65   05/01/18 118/57   04/11/18 113/67    Weight from Last 3 Encounters:   05/23/18 190 lb 9.6 oz (86.5 kg)   05/23/18 188 lb 8 oz (85.5 kg)   05/01/18 193 lb (87.5 kg)              We Performed the Following     UA with Microscopic reflex to Culture          Today's Medication Changes          These changes are accurate as of " 5/23/18  4:01 PM.  If you have any questions, ask your nurse or doctor.               These medicines have changed or have updated prescriptions.        Dose/Directions    insulin glargine 100 UNIT/ML injection   Commonly known as:  LANTUS SOLOSTAR   This may have changed:  additional instructions   Used for:  Type 2 diabetes mellitus with stage 3 chronic kidney disease, with long-term current use of insulin (H)        Inject 22 units daily   Quantity:  15 mL   Refills:  2            Where to get your medicines      These medications were sent to [facility] [place] - [address]  [address], [place] [address]    Hours:  AKANGELES [facility] Phone:  [phone]     insulin glargine 100 UNIT/ML injection                Primary Care Provider Office Phone # Fax #    Jaleesa Velasquez, APRN [phone] [address] [facility] [address]        Equal Access to Services     FANG HAY : Hadii juan manuel ku hadasho Soomaali, waaxda luqadaha, qaybta kaalmada adeegyada, waxay idiin haymartyn neha echeverria . So [facility] [phone].    ATENCIÓN: Si habla español, tiene a gil disposición servicios gratuitos de asistencia lingüística. Maryame al [phone].    We comply with applicable federal civil rights laws and [place] laws. We do not discriminate on the basis of race, color, national origin, age, disability, sex, sexual orientation, or gender identity.            Thank you!     Thank you for choosing [facility]  for your care. Our goal is always to provide you with excellent care. Hearing back from our patients is one way we can continue to improve our services. Please take a few minutes to complete the written survey that you may receive in the mail after your visit with us. Thank you!             Your Updated Medication List - Protect others around you: Learn how to safely use, store and throw away your medicines at  www.disposemymeds.org.          This list is accurate as of 5/23/18  4:01 PM.  Always use your most recent med list.                   Brand Name Dispense Instructions for use Diagnosis    ABILIFY 30 MG tablet   Generic drug:  ARIPiprazole      Take 30 mg by mouth daily        albuterol 108 (90 Base) MCG/ACT Inhaler    PROAIR HFA/PROVENTIL HFA/VENTOLIN HFA    1 Inhaler    Inhale 2 puffs into the lungs every 6 hours as needed for shortness of breath / dyspnea or wheezing    Mild intermittent asthma with acute exacerbation       amLODIPine 5 MG tablet    NORVASC    90 tablet    Take 1 tablet (5 mg) by mouth daily    Essential hypertension       blood glucose monitoring lancets     100 each    Use to test blood sugar 3 times daily    Type 2 diabetes mellitus with stage 3 chronic kidney disease, with long-term current use of insulin (H)       blood glucose monitoring test strip    no brand specified    100 each    1 strip by In Vitro route 3 times daily    Type 2 diabetes mellitus with stage 3 chronic kidney disease, with long-term current use of insulin (H)       Clozapine 200 MG tablet    CLOZARIL     Take 200 mg by mouth 2 times daily        dulaglutide 1.5 MG/0.5ML pen    TRULICITY    6 mL    Inject 1.5 mg Subcutaneous every 7 days    Type 2 diabetes mellitus with stage 3 chronic kidney disease, without long-term current use of insulin (H)       FLUoxetine 20 MG capsule    PROzac     Take 20 mg by mouth daily        insulin glargine 100 UNIT/ML injection    LANTUS SOLOSTAR    15 mL    Inject 22 units daily    Type 2 diabetes mellitus with stage 3 chronic kidney disease, with long-term current use of insulin (H)       lisinopril 10 MG tablet    PRINIVIL/ZESTRIL    90 tablet    Take 0.5 tablets (5 mg) by mouth daily    Essential hypertension       montelukast 10 MG tablet    SINGULAIR    90 tablet    Take 1 tablet (10 mg) by mouth At Bedtime    Mild intermittent asthma with acute exacerbation       omeprazole 20 MG CR  capsule    priLOSEC    60 capsule    Take 1 capsule (20 mg) by mouth daily 1 capsule bid    Gastroesophageal reflux disease without esophagitis       ranitidine 300 MG tablet    ZANTAC    90 tablet    Take 1 tablet (300 mg) by mouth At Bedtime    Gastroesophageal reflux disease without esophagitis       ULTILET SHARPS CONTAINER 1QT Misc     1 each    1 Device as needed    Type 2 diabetes mellitus with stage 3 chronic kidney disease, without long-term current use of insulin (H)

## 2018-05-24 DIAGNOSIS — E11.22 TYPE 2 DIABETES MELLITUS WITH STAGE 3 CHRONIC KIDNEY DISEASE, WITH LONG-TERM CURRENT USE OF INSULIN (H): ICD-10-CM

## 2018-05-24 DIAGNOSIS — E66.01 MORBID OBESITY (H): ICD-10-CM

## 2018-05-24 DIAGNOSIS — Z79.4 TYPE 2 DIABETES MELLITUS WITH STAGE 3 CHRONIC KIDNEY DISEASE, WITH LONG-TERM CURRENT USE OF INSULIN (H): ICD-10-CM

## 2018-05-24 DIAGNOSIS — N18.30 TYPE 2 DIABETES MELLITUS WITH STAGE 3 CHRONIC KIDNEY DISEASE, WITH LONG-TERM CURRENT USE OF INSULIN (H): ICD-10-CM

## 2018-05-24 DIAGNOSIS — K75.81 NASH (NONALCOHOLIC STEATOHEPATITIS): Primary | ICD-10-CM

## 2018-05-24 ASSESSMENT — PATIENT HEALTH QUESTIONNAIRE - PHQ9: SUM OF ALL RESPONSES TO PHQ QUESTIONS 1-9: 2

## 2018-05-24 ASSESSMENT — ANXIETY QUESTIONNAIRES: GAD7 TOTAL SCORE: 2

## 2018-05-24 NOTE — PROGRESS NOTES
Nurse placed orders and updated foster mother.    Sue Carter RN, BSN, PHN  [facility]  GI/Gen Surg/Hepatology Care Coordinator

## 2018-05-24 NOTE — PROGRESS NOTES
Jeremiah Perdomo MD Frischmon, Amanda R, RN                   CBC, BMP, Liver panel, INR, Hg A1c, lipid panel (fasting), protein urine random.     Thanks       HPI      ROS      Physical Exam

## 2018-06-06 ENCOUNTER — VIRTUAL VISIT (OUTPATIENT)
Dept: NURSING | Facility: CLINIC | Age: 33
End: 2018-06-06

## 2018-06-06 DIAGNOSIS — Z79.4 TYPE 2 DIABETES MELLITUS WITH STAGE 3 CHRONIC KIDNEY DISEASE, WITH LONG-TERM CURRENT USE OF INSULIN (H): Primary | ICD-10-CM

## 2018-06-06 DIAGNOSIS — N18.30 TYPE 2 DIABETES MELLITUS WITH STAGE 3 CHRONIC KIDNEY DISEASE, WITH LONG-TERM CURRENT USE OF INSULIN (H): Primary | ICD-10-CM

## 2018-06-06 DIAGNOSIS — E11.22 TYPE 2 DIABETES MELLITUS WITH STAGE 3 CHRONIC KIDNEY DISEASE, WITH LONG-TERM CURRENT USE OF INSULIN (H): Primary | ICD-10-CM

## 2018-06-06 PROCEDURE — 99207 ZZC HEALTH COACHING, NO CHARGE: CPT

## 2018-06-06 NOTE — MR AVS SNAPSHOT
After Visit Summary   6/6/2018    Divina Delgadillo    MRN: [id number]           Patient Information     Date Of Birth          1985        Visit Information        Provider Department      6/6/2018 3:00 PM HEALTH  - REGION 3 [facility]        Today's Diagnoses     Type 2 diabetes mellitus with stage 3 chronic kidney disease, with long-term current use of insulin (H)    -  1       Follow-ups after your visit        Follow-up notes from your care team     Return in 5 weeks (on 7/11/2018).      Your next 10 appointments already scheduled     Jun 14, 2018  2:45 PM CDT   LAB with [place] [facility]   [facility] ([facility])    [address] [facility] [address]   [phone]           Please do not eat 10-12 hours before your appointment if you are coming in fasting for labs on lipids, cholesterol, or glucose (sugar). This does not apply to pregnant women. Water, hot tea and black coffee (with nothing added) are okay. Do not drink other fluids, diet soda or chew gum.            Jun 14, 2018  3:00 PM CDT   SHORT with VERONIQUE Bledsoe [facility] ([facility])    [address] [facility] [facility] [address]   [phone]            Jun 26, 2018  3:00 PM CDT   Diabetic Education with [place] DI[place] ED RESOURCE   [facility])    [address] [facility] [address]   [phone]            Jun 28, 2018 11:40 AM CDT   Return Visit with Jeremiah Perdomo MD   [facility])    [address] [facility] [address] [phone]            Jul 10, 2018  9:30 AM CDT   LAB with LAB FIRST FLOOR [facility] ([facility])    [address] [facility] [id number] [phone]           Please do not eat 10-12 hours before your appointment if you are coming in fasting for  labs on lipids, cholesterol, or glucose (sugar). This does not apply to pregnant women. Water, hot tea and black coffee (with nothing added) are okay. Do not drink other fluids, diet soda or chew gum.            Jul 10, 2018 10:00 AM CDT   Return Visit with Sánchez Dias MD   [email or web address] ([email or web address]    [address] [place] [address] [phone]              Who to contact     If you have questions or need follow up information about today's clinic visit or your schedule please contact [facility] directly at [phone].  Normal or non-critical lab and imaging results will be communicated to you by MyChart, letter or phone within 4 business days after the clinic has received the results. If you do not hear from us within 7 days, please contact the clinic through MyChart or phone. If you have a critical or abnormal lab result, we will notify you by phone as soon as possible.  Submit refill requests through [employer] or call your pharmacy and they will forward the refill request to us. Please allow 3 business days for your refill to be completed.          Additional Information About Your Visit        Care EveryWhere ID     This is your Care EveryWhere ID. This could be used by other organizations to access your [facility] medical records  [id number]         Blood Pressure from Last 3 Encounters:   05/23/18 122/65   05/01/18 118/57   04/11/18 113/67    Weight from Last 3 Encounters:   05/23/18 190 lb 9.6 oz (86.5 kg)   05/23/18 188 lb 8 oz (85.5 kg)   05/01/18 193 lb (87.5 kg)              Today, you had the following     No orders found for display       Primary Care Provider Office Phone # Fax #    VERONIQUE Bledsoe [phone] [address] [facility] [address]        Equal Access to Services     FANG HAY AH: Elsa Santiago, claudia bhardwaj, susie dhillon, aaron paul  indianaquang lindenagusto la'aan ah. So [facility] [phone].    ATENCIÓN: Si maliala imelda, tiene a gil disposición servicios gratuitos de asistencia lingüística. Carlo al [phone].    We comply with applicable federal civil rights laws and [place] laws. We do not discriminate on the basis of race, color, national origin, age, disability, sex, sexual orientation, or gender identity.            Thank you!     Thank you for choosing [facility]  for your care. Our goal is always to provide you with excellent care. Hearing back from our patients is one way we can continue to improve our services. Please take a few minutes to complete the written survey that you may receive in the mail after your visit with us. Thank you!             Your Updated Medication List - Protect others around you: Learn how to safely use, store and throw away your medicines at www.disposemymeds.org.          This list is accurate as of 6/6/18  4:25 PM.  Always use your most recent med list.                   Brand Name Dispense Instructions for use Diagnosis    ABILIFY 30 MG tablet   Generic drug:  ARIPiprazole      Take 30 mg by mouth daily        albuterol 108 (90 Base) MCG/ACT Inhaler    PROAIR HFA/PROVENTIL HFA/VENTOLIN HFA    1 Inhaler    Inhale 2 puffs into the lungs every 6 hours as needed for shortness of breath / dyspnea or wheezing    Mild intermittent asthma with acute exacerbation       amLODIPine 5 MG tablet    NORVASC    90 tablet    Take 1 tablet (5 mg) by mouth daily    Essential hypertension       blood glucose monitoring lancets     100 each    Use to test blood sugar 3 times daily    Type 2 diabetes mellitus with stage 3 chronic kidney disease, with long-term current use of insulin (H)       blood glucose monitoring test strip    no brand specified    100 each    1 strip by In Vitro route 3 times daily    Type 2 diabetes mellitus with stage 3 chronic kidney disease, with long-term current use of insulin (H)       Clozapine  200 MG tablet    CLOZARIL     Take 200 mg by mouth 2 times daily        dulaglutide 1.5 MG/0.5ML pen    TRULICITY    6 mL    Inject 1.5 mg Subcutaneous every 7 days    Type 2 diabetes mellitus with stage 3 chronic kidney disease, without long-term current use of insulin (H)       FLUoxetine 20 MG capsule    PROzac     Take 20 mg by mouth daily        insulin glargine 100 UNIT/ML injection    LANTUS SOLOSTAR    15 mL    Inject 22 units daily    Type 2 diabetes mellitus with stage 3 chronic kidney disease, with long-term current use of insulin (H)       lisinopril 10 MG tablet    PRINIVIL/ZESTRIL    90 tablet    Take 0.5 tablets (5 mg) by mouth daily    Essential hypertension       montelukast 10 MG tablet    SINGULAIR    90 tablet    Take 1 tablet (10 mg) by mouth At Bedtime    Mild intermittent asthma with acute exacerbation       omeprazole 20 MG CR capsule    priLOSEC    60 capsule    Take 1 capsule (20 mg) by mouth daily 1 capsule bid    Gastroesophageal reflux disease without esophagitis       ranitidine 300 MG tablet    ZANTAC    90 tablet    Take 1 tablet (300 mg) by mouth At Bedtime    Gastroesophageal reflux disease without esophagitis       ULTILET SHARPS CONTAINER 1QT Misc     1 each    1 Device as needed    Type 2 diabetes mellitus with stage 3 chronic kidney disease, without long-term current use of insulin (H)

## 2018-06-06 NOTE — PROGRESS NOTES
June 6, 2018    [facility]  [address] [facility]  [place] [address]  [phone]  Health Coaching Progress Note    Patient Name: Divina Delgadillo Date: June 6, 2018      Session Length: 10      DATA    PRM Master Survey Scores Reviewed: Yes    Core Healthy Days Survey:         EMY Score (Last Two) 5/10/2018   EMY Raw Score 35   Activation Score 72.1   EMY Level 3       PHQ-2 Score 5/10/2018 12/7/2017   PHQ-2 Total Score Interpretation - Positive if 3 or more points; Administer PHQ-9 if positive 1 1       PHQ-9 SCORE 12/7/2017 5/23/2018   Total Score 12 2       Treatment Objective(s) Addressed in This Session:  Target Behavior(s): diet/weight loss    Current Stressors / Issues:  No stressors or issues noted today.    What Patient Does Well:   Divina says the changes she's making are going well so far and she is feeling good about her progress.  Previous Successes:   Divina has been working on both her diet and exercise/activity goals.  Areas in Need of Improvement:   Divina notes no areas in need of improvement at this time and plans to continue working on the same goals.  Barriers to Change:   Divina notes no barriers currently.  Reasons for Change:   Divina wants to lose weight for her health and self-esteem.  Plan/Goal for the Next 4 Weeks:     GOAL #1: Continue doing something active or exercising most days  GOAL #1 Progress Toward Goal: 25%  GOAL #2: Continue increasing healthy eating habits-increase days eating breakfast, increase vegetables, decrease portions, monitor carbs  GOAL #2 Progress Toward Goal: 25%    Intervention:  Motivational Interviewing    MI Intervention: Supported Autonomy, Collaboration, Evocation, Permission to raise concern or advise, Open-ended questions and Reflections: simple and complex     Change Talk Expressed by the Patient: Ability to change Committment to change Activation Taking steps    Provider Response to Change Talk: E  - Evoked more info from patient about behavior change, A - Affirmed patient's thoughts, decisions, or attempts at behavior change, R - Reflected patient's change talk and S - Summarized patient's change talk statements    Assessment / Progress on Treatment Objective(s) / Homework:    Satisfactory progress - ACTION (Actively working towards change); Intervened by reinforcing change plan / affirming steps taken         Plan: (Homework, other):  Patient was encouraged to continue to seek condition-related information and education, as well as schedule a follow up appointment with the Health  in 5 weeks. Patient has set self-identified goals and will monitor progress until the next appointment.  Next visit scheduled for July 11 at 3PM.      Mahin Penny

## 2018-06-14 ENCOUNTER — OFFICE VISIT (OUTPATIENT)
Dept: FAMILY MEDICINE | Facility: CLINIC | Age: 33
End: 2018-06-14
Payer: MEDICARE

## 2018-06-14 VITALS
WEIGHT: 188.6 LBS | HEIGHT: 65 IN | BODY MASS INDEX: 31.42 KG/M2 | SYSTOLIC BLOOD PRESSURE: 108 MMHG | DIASTOLIC BLOOD PRESSURE: 65 MMHG | TEMPERATURE: 97.1 F | HEART RATE: 93 BPM

## 2018-06-14 DIAGNOSIS — Z85.07 HISTORY OF PANCREATIC CANCER: ICD-10-CM

## 2018-06-14 DIAGNOSIS — Z79.899 MEDICATION MANAGEMENT: ICD-10-CM

## 2018-06-14 DIAGNOSIS — F25.0 SCHIZOAFFECTIVE DISORDER, BIPOLAR TYPE (H): ICD-10-CM

## 2018-06-14 DIAGNOSIS — Z00.00 ANNUAL PHYSICAL EXAM: Primary | ICD-10-CM

## 2018-06-14 LAB
BASOPHILS # BLD AUTO: 0.1 10E9/L (ref 0–0.2)
BASOPHILS NFR BLD AUTO: 0.5 %
DIFFERENTIAL METHOD BLD: ABNORMAL
EOSINOPHIL # BLD AUTO: 0.2 10E9/L (ref 0–0.7)
EOSINOPHIL NFR BLD AUTO: 1.1 %
ERYTHROCYTE [DISTWIDTH] IN BLOOD BY AUTOMATED COUNT: 13.5 % (ref 10–15)
HCT VFR BLD AUTO: 39.8 % (ref 35–47)
HGB BLD-MCNC: 12.7 G/DL (ref 11.7–15.7)
LYMPHOCYTES # BLD AUTO: 5.4 10E9/L (ref 0.8–5.3)
LYMPHOCYTES NFR BLD AUTO: 27.3 %
MCH RBC QN AUTO: 31.2 PG (ref 26.5–33)
MCHC RBC AUTO-ENTMCNC: 31.9 G/DL (ref 31.5–36.5)
MCV RBC AUTO: 98 FL (ref 78–100)
MONOCYTES # BLD AUTO: 1.5 10E9/L (ref 0–1.3)
MONOCYTES NFR BLD AUTO: 7.5 %
NEUTROPHILS # BLD AUTO: 12.5 10E9/L (ref 1.6–8.3)
NEUTROPHILS NFR BLD AUTO: 63.6 %
PLATELET # BLD AUTO: 324 10E9/L (ref 150–450)
RBC # BLD AUTO: 4.07 10E12/L (ref 3.8–5.2)
WBC # BLD AUTO: 19.7 10E9/L (ref 4–11)

## 2018-06-14 PROCEDURE — 85025 COMPLETE CBC W/AUTO DIFF WBC: CPT | Performed by: CLINICAL NURSE SPECIALIST

## 2018-06-14 PROCEDURE — 36415 COLL VENOUS BLD VENIPUNCTURE: CPT | Performed by: CLINICAL NURSE SPECIALIST

## 2018-06-14 PROCEDURE — G0476 HPV COMBO ASSAY CA SCREEN: HCPCS | Performed by: NURSE PRACTITIONER

## 2018-06-14 PROCEDURE — G0145 SCR C/V CYTO,THINLAYER,RESCR: HCPCS | Performed by: NURSE PRACTITIONER

## 2018-06-14 PROCEDURE — 99213 OFFICE O/P EST LOW 20 MIN: CPT | Mod: 25 | Performed by: NURSE PRACTITIONER

## 2018-06-14 PROCEDURE — 99395 PREV VISIT EST AGE 18-39: CPT | Performed by: NURSE PRACTITIONER

## 2018-06-14 NOTE — LETTER
June 20, 2018    Divina Delgadillo  [address] [address] [place] [id number]    Dear Divina,  We are happy to inform you that your PAP smear result from 6/14/18 is normal.  We are now able to do a follow up test on PAP smears. The DNA test is for HPV (Human Papilloma Virus). Cervical cancer is closely linked with certain types of HPV. Your results showed no evidence of high risk HPV.  Therefore we recommend you return in 3 years for your next pap smear and HPV test.  You will still need to return to the clinic every year for an annual exam and other preventive tests.  Please contact the clinic at [phone] with any questions.  Sincerely,    VERONIQUE Spaers CNP/rlm

## 2018-06-14 NOTE — MR AVS SNAPSHOT
After Visit Summary   6/14/2018    Divina Delgadillo    MRN: [id number]           Patient Information     Date Of Birth          1985        Visit Information        Provider Department      6/14/2018 3:00 PM Jaleesa Velasquez APRN [facility]        Today's Diagnoses     Annual physical exam    -  1    History of pancreatic cancer          Care Instructions      Preventive Health Recommendations  Female Ages 26 - 39  Yearly exam:   See your health care provider every year in order to    Review health changes.     Discuss preventive care.      Review your medicines if you your doctor has prescribed any.    Until age 30: Get a Pap test every three years (more often if you have had an abnormal result).    After age 30: Talk to your doctor about whether you should have a Pap test every 3 years or have a Pap test with HPV screening every 5 years.   You do not need a Pap test if your uterus was removed (hysterectomy) and you have not had cancer.  You should be tested each year for STDs (sexually transmitted diseases), if you're at risk.   Talk to your provider about how often to have your cholesterol checked.  If you are at risk for diabetes, you should have a diabetes test (fasting glucose).  Shots: Get a flu shot each year. Get a tetanus shot every 10 years.   Nutrition:     Eat at least 5 servings of fruits and vegetables each day.    Eat whole-grain bread, whole-wheat pasta and brown rice instead of white grains and rice.    Talk to your provider about Calcium and Vitamin D.     Lifestyle    Exercise at least 150 minutes a week (30 minutes a day, 5 days of the week). This will help you control your weight and prevent disease.    Limit alcohol to one drink per day.    No smoking.     Wear sunscreen to prevent skin cancer.    See your dentist every six months for an exam and cleaning.    Pap test    Schedule fasting lab appointment       For sores on your bottom apply  Bacitracin cream for prevention of infection    Try to exercise more, walk more   Drink enough fluids, more fruits and veggies     Schedule abdominal US     Do labs either this, or next week, prior appointment with liver doctor                   Follow-ups after your visit        Your next 10 appointments already scheduled     Jun 26, 2018  3:00 PM CDT   Diabetic Education with [place] [facility]   [facility] ([facility])    [address] [facility] [address]   [phone]            Jun 28, 2018 11:40 AM CDT   Return Visit with Jeremiah Perdomo MD   [facility]    [address] [place] [id number] [phone]            Jul 10, 2018  9:30 AM CDT   LAB with LAB FIRST FLOOR [facility] ([facility])    [address] [place] [id number] [phone]           Please do not eat 10-12 hours before your appointment if you are coming in fasting for labs on lipids, cholesterol, or glucose (sugar). This does not apply to pregnant women. Water, hot tea and black coffee (with nothing added) are okay. Do not drink other fluids, diet soda or chew gum.            Jul 10, 2018 10:00 AM CDT   Return Visit with Sánchez Dias MD   [facility] ([facility])    [address] [place] [id number] [phone]              Future tests that were ordered for you today     Open Future Orders        Priority Expected Expires Ordered    US Abdomen Complete Routine  6/14/2019 6/14/2018            Who to contact     If you have questions or need follow up information about today's clinic visit or your schedule please contact [facility] directly at [phone].  Normal or non-critical lab and imaging results will be communicated to you by MyChart, letter or phone within 4 business days after the clinic has  "received the results. If you do not hear from us within 7 days, please contact the clinic through [employer] or phone. If you have a critical or abnormal lab result, we will notify you by phone as soon as possible.  Submit refill requests through [employer] or call your pharmacy and they will forward the refill request to us. Please allow 3 business days for your refill to be completed.          Additional Information About Your Visit        Care EveryWhere ID     This is your Care EveryWhere ID. This could be used by other organizations to access your [facility] medical records  [id number]        Your Vitals Were     Pulse Temperature Height BMI (Body Mass Index)          93 97.1  F (36.2  C) (Tympanic) 5' 4.5\" (1.638 m) 31.87 kg/m2         Blood Pressure from Last 3 Encounters:   06/14/18 108/65   05/23/18 122/65   05/01/18 118/57    Weight from Last 3 Encounters:   06/14/18 188 lb 9.6 oz (85.5 kg)   05/23/18 190 lb 9.6 oz (86.5 kg)   05/23/18 188 lb 8 oz (85.5 kg)              We Performed the Following     Pap imaged thin layer screen with HPV - recommended age 30 - 65 years (select HPV order below)        Primary Care Provider Office Phone # Fax #    Jaleesa Watson VERONIQUE Velasquez [phone] [address] [facility] [address]        Equal Access to Services     AFNG HAY AH: Hadii juan manuel moreno hadasho Sotrace, waaxda luqadaha, qaybta kaalmada adequangyada, aaron munoz. So [facility] [phone].    ATENCIÓN: Si habla español, tiene a gil disposición servicios gratuitos de asistencia lingüística. Maryame al [phone].    We comply with applicable federal civil rights laws and [place] laws. We do not discriminate on the basis of race, color, national origin, age, disability, sex, sexual orientation, or gender identity.            Thank you!     Thank you for choosing [facility]  for your care. Our goal is always to provide you with excellent care. Hearing back " from our patients is one way we can continue to improve our services. Please take a few minutes to complete the written survey that you may receive in the mail after your visit with us. Thank you!             Your Updated Medication List - Protect others around you: Learn how to safely use, store and throw away your medicines at www.disposemymeds.org.          This list is accurate as of 6/14/18  3:34 PM.  Always use your most recent med list.                   Brand Name Dispense Instructions for use Diagnosis    ABILIFY 30 MG tablet   Generic drug:  ARIPiprazole      Take 30 mg by mouth daily        albuterol 108 (90 Base) MCG/ACT Inhaler    PROAIR HFA/PROVENTIL HFA/VENTOLIN HFA    1 Inhaler    Inhale 2 puffs into the lungs every 6 hours as needed for shortness of breath / dyspnea or wheezing    Mild intermittent asthma with acute exacerbation       amLODIPine 5 MG tablet    NORVASC    90 tablet    Take 1 tablet (5 mg) by mouth daily    Essential hypertension       blood glucose monitoring lancets     100 each    Use to test blood sugar 3 times daily    Type 2 diabetes mellitus with stage 3 chronic kidney disease, with long-term current use of insulin (H)       blood glucose monitoring test strip    no brand specified    100 each    1 strip by In Vitro route 3 times daily    Type 2 diabetes mellitus with stage 3 chronic kidney disease, with long-term current use of insulin (H)       Clozapine 200 MG tablet    CLOZARIL     Take 200 mg by mouth 2 times daily        dulaglutide 1.5 MG/0.5ML pen    TRULICITY    6 mL    Inject 1.5 mg Subcutaneous every 7 days    Type 2 diabetes mellitus with stage 3 chronic kidney disease, without long-term current use of insulin (H)       FLUoxetine 20 MG capsule    PROzac     Take 20 mg by mouth daily        insulin glargine 100 UNIT/ML injection    LANTUS SOLOSTAR    15 mL    Inject 22 units daily    Type 2 diabetes mellitus with stage 3 chronic kidney disease, with long-term  current use of insulin (H)       lisinopril 10 MG tablet    PRINIVIL/ZESTRIL    90 tablet    Take 0.5 tablets (5 mg) by mouth daily    Essential hypertension       montelukast 10 MG tablet    SINGULAIR    90 tablet    Take 1 tablet (10 mg) by mouth At Bedtime    Mild intermittent asthma with acute exacerbation       omeprazole 20 MG CR capsule    priLOSEC    60 capsule    Take 1 capsule (20 mg) by mouth daily 1 capsule bid    Gastroesophageal reflux disease without esophagitis       ranitidine 300 MG tablet    ZANTAC    90 tablet    Take 1 tablet (300 mg) by mouth At Bedtime    Gastroesophageal reflux disease without esophagitis       ULTILET SHARPS CONTAINER 1QT Misc     1 each    1 Device as needed    Type 2 diabetes mellitus with stage 3 chronic kidney disease, without long-term current use of insulin (H)

## 2018-06-14 NOTE — PATIENT INSTRUCTIONS
Preventive Health Recommendations  Female Ages 26 - 39  Yearly exam:   See your health care provider every year in order to    Review health changes.     Discuss preventive care.      Review your medicines if you your doctor has prescribed any.    Until age 30: Get a Pap test every three years (more often if you have had an abnormal result).    After age 30: Talk to your doctor about whether you should have a Pap test every 3 years or have a Pap test with HPV screening every 5 years.   You do not need a Pap test if your uterus was removed (hysterectomy) and you have not had cancer.  You should be tested each year for STDs (sexually transmitted diseases), if you're at risk.   Talk to your provider about how often to have your cholesterol checked.  If you are at risk for diabetes, you should have a diabetes test (fasting glucose).  Shots: Get a flu shot each year. Get a tetanus shot every 10 years.   Nutrition:     Eat at least 5 servings of fruits and vegetables each day.    Eat whole-grain bread, whole-wheat pasta and brown rice instead of white grains and rice.    Talk to your provider about Calcium and Vitamin D.     Lifestyle    Exercise at least 150 minutes a week (30 minutes a day, 5 days of the week). This will help you control your weight and prevent disease.    Limit alcohol to one drink per day.    No smoking.     Wear sunscreen to prevent skin cancer.    See your dentist every six months for an exam and cleaning.    Pap test    Schedule fasting lab appointment       For sores on your bottom apply Bacitracin cream for prevention of infection    Try to exercise more, walk more   Drink enough fluids, more fruits and veggies     Schedule abdominal US     Do labs either this, or next week, prior appointment with liver doctor

## 2018-06-14 NOTE — PROGRESS NOTES
SUBJECTIVE:   CC: Divina Delgadillo is an 33 year old woman who presents for preventive health visit.     Healthy Habits:    Do you get at least three servings of calcium containing foods daily (dairy, green leafy vegetables, etc.)? yes    Amount of exercise or daily activities, outside of work: Has been walking and riding her bike every day for 20 minutes     Problems taking medications regularly No    Medication side effects: No    Have you had an eye exam in the past two years? yes    Do you see a dentist twice per year? yes    Do you have sleep apnea, excessive snoring or daytime drowsiness?no    Today's PHQ-2 Score:   PHQ-2 ( 1999 [employer]) 6/14/2018 5/10/2018   Q1: Little interest or pleasure in doing things 0 1   Q2: Feeling down, depressed or hopeless 0 0   PHQ-2 Score 0 1     Abuse: Current or Past(Physical, Sexual or Emotional)- No  Do you feel safe in your environment - Yes    Social History   Substance Use Topics     Smoking status: Former Smoker     Packs/day: 1.00     Years: 4.00     Types: Cigarettes     Smokeless tobacco: Never Used      Comment: little less than a pack a day      Alcohol use No     If you drink alcohol do you typically have >3 drinks per day or >7 drinks per week? No                     Reviewed orders with patient.  Reviewed health maintenance and updated orders accordingly - Yes  Labs reviewed in EPIC    Mammogram not appropriate for this patient based on age.    Pertinent mammograms are reviewed under the imaging tab.  History of abnormal Pap smear:   YES - updated in Problem List and Health Maintenance accordingly  Last 3 Pap Results:   PAP (no units)   Date Value   06/15/2017 NIL   03/04/2009 NIL   03/12/2008 ASC-US (A)     Reviewed and updated as needed this visit by clinical staff  Tobacco  Allergies  Med Hx  Surg Hx  Fam Hx  Soc Hx        Reviewed and updated as needed this visit by Provider            ROS:  CONSTITUTIONAL: NEGATIVE for fever, chills, change in  "weight  INTEGUMENTARU/SKIN: NEGATIVE for worrisome rashes, moles or lesions  EYES: NEGATIVE for vision changes or irritation  ENT: NEGATIVE for ear, mouth and throat problems  RESP: NEGATIVE for significant cough or SOB  BREAST: NEGATIVE for masses, tenderness or discharge  CV: NEGATIVE for chest pain, palpitations or peripheral edema  GI: POSITIVE for occasional lower abdominal pain, history of pancreatic cancer.   : NEGATIVE for unusual urinary or vaginal symptoms. Periods are regular.  MUSCULOSKELETAL: NEGATIVE for significant arthralgias or myalgia  NEURO: NEGATIVE for weakness, dizziness or paresthesias  PSYCHIATRIC: NEGATIVE for changes in mood or affect    OBJECTIVE:   /65  Pulse 93  Temp 97.1  F (36.2  C) (Tympanic)  Ht 5' 4.5\" (1.638 m)  Wt 188 lb 9.6 oz (85.5 kg)  BMI 31.87 kg/m2  EXAM:  GENERAL: healthy, alert and no distress  EYES: Eyes grossly normal to inspection, PERRL and conjunctivae and sclerae normal  HENT: ear canals and TM's normal, nose and mouth without ulcers or lesions  NECK: no adenopathy, no asymmetry, masses, or scars and thyroid normal to palpation  RESP: lungs clear to auscultation - no rales, rhonchi or wheezes  CV: regular rates and rhythm, soft systolic murmur, peripheral pulses strong and trace bilateral lower extremity pitting edema to feet and ankles     ABDOMEN: mild tenderness RUQ, no organomegaly or masses, liver span normal to percussion and bowel sounds normal   (female): normal female external genitalia, normal urethral meatus, vaginal mucosa, normal cervix/adnexa/uterus without masses or discharge  MS: no gross musculoskeletal defects noted, no edema  SKIN: small area of erythema on the left buttock without evidence of infection   NEURO: Normal strength and tone, mentation intact and speech normal  PSYCH: mentation appears normal, affect normal/bright    ASSESSMENT/PLAN:   1. Annual physical exam  -will get labs next week, there are pending orders from liver " "clinic provider Dr. Perdomo  - Pap imaged thin layer screen with HPV - recommended age 30 - 65 years (select HPV order below)    2. History of pancreatic cancer  -mild RUQ abdominal pain, history of pancreatic cancer, will repeat US, LFT's, Lipase, BMP pending   - US Abdomen Complete; Future    COUNSELING:   Reviewed preventive health counseling, as reflected in patient instructions       Regular exercise       Healthy diet/nutrition         reports that she has quit smoking. Her smoking use included Cigarettes. She has a 4.00 pack-year smoking history. She has never used smokeless tobacco.  Tobacco Cessation Action Plan: Information offered: Patient not interested at this time  Estimated body mass index is 31.87 kg/(m^2) as calculated from the following:    Height as of this encounter: 5' 4.5\" (1.638 m).    Weight as of this encounter: 188 lb 9.6 oz (85.5 kg).   Weight management plan: Discussed healthy diet and exercise guidelines and patient will follow up in 3 months in clinic to re-evaluate.    Counseling Resources:  ATP IV Guidelines  Pooled Cohorts Equation Calculator  Breast Cancer Risk Calculator  FRAX Risk Assessment  ICSI Preventive Guidelines  Dietary Guidelines for Americans, 2010  USDA's MyPlate  ASA Prophylaxis  Lung CA Screening    VERONIQUE Spears CNP  [facility]  "

## 2018-06-18 LAB
COPATH REPORT: NORMAL
PAP: NORMAL

## 2018-06-19 LAB
FINAL DIAGNOSIS: NORMAL
HPV HR 12 DNA CVX QL NAA+PROBE: NEGATIVE
HPV16 DNA SPEC QL NAA+PROBE: NEGATIVE
HPV18 DNA SPEC QL NAA+PROBE: NEGATIVE
SPECIMEN DESCRIPTION: NORMAL
SPECIMEN SOURCE CVX/VAG CYTO: NORMAL

## 2018-06-21 DIAGNOSIS — E11.22 TYPE 2 DIABETES MELLITUS WITH STAGE 3 CHRONIC KIDNEY DISEASE, WITH LONG-TERM CURRENT USE OF INSULIN (H): ICD-10-CM

## 2018-06-21 DIAGNOSIS — Z79.4 TYPE 2 DIABETES MELLITUS WITH STAGE 3 CHRONIC KIDNEY DISEASE, WITH LONG-TERM CURRENT USE OF INSULIN (H): ICD-10-CM

## 2018-06-21 DIAGNOSIS — E66.01 MORBID OBESITY (H): ICD-10-CM

## 2018-06-21 DIAGNOSIS — N18.30 TYPE 2 DIABETES MELLITUS WITH STAGE 3 CHRONIC KIDNEY DISEASE, WITH LONG-TERM CURRENT USE OF INSULIN (H): ICD-10-CM

## 2018-06-21 DIAGNOSIS — K75.81 NASH (NONALCOHOLIC STEATOHEPATITIS): ICD-10-CM

## 2018-06-21 DIAGNOSIS — Z85.07 HISTORY OF PANCREATIC CANCER: ICD-10-CM

## 2018-06-21 LAB
ALBUMIN SERPL-MCNC: 3.3 G/DL (ref 3.4–5)
ALP SERPL-CCNC: 161 U/L (ref 40–150)
ALT SERPL W P-5'-P-CCNC: 68 U/L (ref 0–50)
ANION GAP SERPL CALCULATED.3IONS-SCNC: 6 MMOL/L (ref 3–14)
AST SERPL W P-5'-P-CCNC: 41 U/L (ref 0–45)
BILIRUB DIRECT SERPL-MCNC: 0.1 MG/DL (ref 0–0.2)
BILIRUB SERPL-MCNC: 0.3 MG/DL (ref 0.2–1.3)
BUN SERPL-MCNC: 30 MG/DL (ref 7–30)
CALCIUM SERPL-MCNC: 8.8 MG/DL (ref 8.5–10.1)
CHLORIDE SERPL-SCNC: 108 MMOL/L (ref 94–109)
CHOLEST SERPL-MCNC: 215 MG/DL
CO2 SERPL-SCNC: 24 MMOL/L (ref 20–32)
CREAT SERPL-MCNC: 1.63 MG/DL (ref 0.52–1.04)
GFR SERPL CREATININE-BSD FRML MDRD: 37 ML/MIN/1.7M2
GLUCOSE SERPL-MCNC: 115 MG/DL (ref 70–99)
HBA1C MFR BLD: 7.2 % (ref 0–5.6)
HDLC SERPL-MCNC: 57 MG/DL
LDLC SERPL CALC-MCNC: 141 MG/DL
LIPASE SERPL-CCNC: 309 U/L (ref 73–393)
NONHDLC SERPL-MCNC: 158 MG/DL
POTASSIUM SERPL-SCNC: 4.5 MMOL/L (ref 3.4–5.3)
PROT SERPL-MCNC: 7.6 G/DL (ref 6.8–8.8)
SODIUM SERPL-SCNC: 138 MMOL/L (ref 133–144)
TRIGL SERPL-MCNC: 86 MG/DL

## 2018-06-21 PROCEDURE — 80061 LIPID PANEL: CPT | Performed by: INTERNAL MEDICINE

## 2018-06-21 PROCEDURE — 80048 BASIC METABOLIC PNL TOTAL CA: CPT | Performed by: INTERNAL MEDICINE

## 2018-06-21 PROCEDURE — 83690 ASSAY OF LIPASE: CPT | Performed by: NURSE PRACTITIONER

## 2018-06-21 PROCEDURE — 83036 HEMOGLOBIN GLYCOSYLATED A1C: CPT | Performed by: INTERNAL MEDICINE

## 2018-06-21 PROCEDURE — 36415 COLL VENOUS BLD VENIPUNCTURE: CPT | Performed by: INTERNAL MEDICINE

## 2018-06-21 PROCEDURE — 80076 HEPATIC FUNCTION PANEL: CPT | Performed by: INTERNAL MEDICINE

## 2018-06-21 PROCEDURE — 85025 COMPLETE CBC W/AUTO DIFF WBC: CPT | Performed by: INTERNAL MEDICINE

## 2018-06-22 LAB
BASOPHILS # BLD AUTO: 0.1 10E9/L (ref 0–0.2)
BASOPHILS NFR BLD AUTO: 0.8 %
DIFFERENTIAL METHOD BLD: ABNORMAL
EOSINOPHIL # BLD AUTO: 0.4 10E9/L (ref 0–0.7)
EOSINOPHIL NFR BLD AUTO: 3 %
ERYTHROCYTE [DISTWIDTH] IN BLOOD BY AUTOMATED COUNT: 13.6 % (ref 10–15)
HCT VFR BLD AUTO: 39.6 % (ref 35–47)
HGB BLD-MCNC: 12.4 G/DL (ref 11.7–15.7)
IMM GRANULOCYTES # BLD: 0.1 10E9/L (ref 0–0.4)
IMM GRANULOCYTES NFR BLD: 0.8 %
LYMPHOCYTES # BLD AUTO: 5 10E9/L (ref 0.8–5.3)
LYMPHOCYTES NFR BLD AUTO: 34.3 %
MCH RBC QN AUTO: 30.8 PG (ref 26.5–33)
MCHC RBC AUTO-ENTMCNC: 31.3 G/DL (ref 31.5–36.5)
MCV RBC AUTO: 99 FL (ref 78–100)
MONOCYTES # BLD AUTO: 1.2 10E9/L (ref 0–1.3)
MONOCYTES NFR BLD AUTO: 8.2 %
NEUTROPHILS # BLD AUTO: 7.7 10E9/L (ref 1.6–8.3)
NEUTROPHILS NFR BLD AUTO: 52.9 %
NRBC # BLD AUTO: 0 10*3/UL
NRBC BLD AUTO-RTO: 0 /100
PLATELET # BLD AUTO: 319 10E9/L (ref 150–450)
PLATELET # BLD EST: ABNORMAL 10*3/UL
RBC # BLD AUTO: 4.02 10E12/L (ref 3.8–5.2)
RBC MORPH BLD: NORMAL
WBC # BLD AUTO: 14.6 10E9/L (ref 4–11)

## 2018-06-25 DIAGNOSIS — E66.01 MORBID OBESITY (H): ICD-10-CM

## 2018-06-25 DIAGNOSIS — N18.30 TYPE 2 DIABETES MELLITUS WITH STAGE 3 CHRONIC KIDNEY DISEASE, WITH LONG-TERM CURRENT USE OF INSULIN (H): ICD-10-CM

## 2018-06-25 DIAGNOSIS — Z79.4 TYPE 2 DIABETES MELLITUS WITH STAGE 3 CHRONIC KIDNEY DISEASE, WITH LONG-TERM CURRENT USE OF INSULIN (H): ICD-10-CM

## 2018-06-25 DIAGNOSIS — K75.81 NASH (NONALCOHOLIC STEATOHEPATITIS): ICD-10-CM

## 2018-06-25 DIAGNOSIS — E11.22 TYPE 2 DIABETES MELLITUS WITH STAGE 3 CHRONIC KIDNEY DISEASE, WITH LONG-TERM CURRENT USE OF INSULIN (H): ICD-10-CM

## 2018-06-25 LAB
COLLECT DURATION TIME UR: 24 H
CREAT 24H UR-MRATE: 1.18 G/(24.H) (ref 0.8–1.8)
CREAT UR-MCNC: 94 MG/DL
PROT 24H UR-MRATE: 0.16 G/(24.H) (ref 0.04–0.23)
PROT UR-MCNC: 0.12 G/L
PROT/CREAT 24H UR: 0.13 G/G CR (ref 0–0.2)
SPECIMEN VOL UR: 1250 ML

## 2018-06-25 PROCEDURE — 81050 URINALYSIS VOLUME MEASURE: CPT | Performed by: INTERNAL MEDICINE

## 2018-06-25 PROCEDURE — 84156 ASSAY OF PROTEIN URINE: CPT | Performed by: INTERNAL MEDICINE

## 2018-06-26 ENCOUNTER — TELEPHONE (OUTPATIENT)
Dept: PODIATRY | Facility: CLINIC | Age: 33
End: 2018-06-26

## 2018-06-26 DIAGNOSIS — E11.43 TYPE II DIABETES MELLITUS WITH PERIPHERAL AUTONOMIC NEUROPATHY (H): Primary | ICD-10-CM

## 2018-06-26 DIAGNOSIS — L84 CALLUS OF FOOT: ICD-10-CM

## 2018-06-26 DIAGNOSIS — M20.42 HAMMER TOES OF BOTH FEET: ICD-10-CM

## 2018-06-26 DIAGNOSIS — M20.41 HAMMER TOES OF BOTH FEET: ICD-10-CM

## 2018-06-26 NOTE — TELEPHONE ENCOUNTER
Reason for Call: Request for an order or referral:    Order or referral being requested: diabetic shoes    Date needed: as soon as possible    Has the patient been seen by the PCP for this problem? Not Applicable    Additional comments: pt foster mother Lorin calling stating she is needing a new order for diabetic shoes.     Phone number Patient can be reached at:  Home number on file [phone] (home)    Best Time:  Any     Can we leave a detailed message on this number?  YES    Call taken on 6/26/2018 at 10:47 AM by Aniya Aparicio

## 2018-06-28 ENCOUNTER — HOSPITAL ENCOUNTER (OUTPATIENT)
Dept: ULTRASOUND IMAGING | Facility: CLINIC | Age: 33
Discharge: HOME | End: 2018-06-28
Attending: NURSE PRACTITIONER | Admitting: NURSE PRACTITIONER
Payer: MEDICARE

## 2018-06-28 ENCOUNTER — OFFICE VISIT (OUTPATIENT)
Dept: GASTROENTEROLOGY | Facility: CLINIC | Age: 33
End: 2018-06-28
Payer: MEDICARE

## 2018-06-28 VITALS
DIASTOLIC BLOOD PRESSURE: 77 MMHG | SYSTOLIC BLOOD PRESSURE: 139 MMHG | RESPIRATION RATE: 18 BRPM | OXYGEN SATURATION: 97 % | BODY MASS INDEX: 34.14 KG/M2 | HEIGHT: 63 IN | WEIGHT: 192.7 LBS | HEART RATE: 76 BPM

## 2018-06-28 DIAGNOSIS — K75.81 NASH (NONALCOHOLIC STEATOHEPATITIS): Primary | ICD-10-CM

## 2018-06-28 DIAGNOSIS — Z85.07 HISTORY OF PANCREATIC CANCER: ICD-10-CM

## 2018-06-28 DIAGNOSIS — N18.30 CKD (CHRONIC KIDNEY DISEASE) STAGE 3, GFR 30-59 ML/MIN (H): Primary | ICD-10-CM

## 2018-06-28 PROCEDURE — 76700 US EXAM ABDOM COMPLETE: CPT

## 2018-06-28 PROCEDURE — 99214 OFFICE O/P EST MOD 30 MIN: CPT | Performed by: INTERNAL MEDICINE

## 2018-06-28 NOTE — PROGRESS NOTES
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    She presents with her foster mother for follow-up.    Overall she continues to be stable.  Liver enzymes are not significantly different from before, her synthetic function is relatively stable and normal.  Her liver enzymes have fluctuated along with changes in her hemoglobin A1c, was over 10% 6 month ago however is currently down to 7.2%    Spend the majority of the visit discussing the nature of her condition and the importance of controlling her risk factors including diabetes and excess weight.  Although she does not have clear signs of advanced liver disease or fibrosis and her ultrasound shows relatively normal liver, she does remain at high risk for fibrosis with progression to cirrhosis based on the above risk factors.  -Encouraged her to implement a strict lifestyle routine in order to achieve sustainable weight loss and improvement in diabetes.  -Again I offered her referral to weight management clinic with health coaching in order to assist with the above-noted changes.  -Encouraged her to visit with dietitian to explore healthier dietary choices.  -Congratulated her on achieving and maintaining sobriety from alcohol.  -Would suggest checking if her liver has any fibrosis with noninvasive modality such as fibrosis can or MR elastography.  Can discuss further with next clinic visit.    RTC in 1 year or sooner as needed    Jeremiah Perdomo MD  [facility]  Transplant Hepatology clinic    -----------------------------------------------------       HPI:   Divina Delgadillo is a 33 year old female with fatty liver disease who presents for follow-up.    Please refer to prior clinic note for details of initial presentation.   Since last clinic visit, she reports starting a new job and is very excited about the upper extremity.  Her weight has fluctuated within 3-5 pounds, however has not been able to achieve sustainable weight loss.  Otherwise denies new problems or issues.  Denies alcohol use.    Otherwise, denies chest pain, shortness of breath, abdominal pain, nausea, vomiting fevers, chills, bleeding, jaundice, confusion, falls, rash, pruritis, leg swelling or abdominal distention. Has stable weight, appetite and bowel habits.         Past Medical History:     Past Medical History:   Diagnosis Date     Cervical high risk HPV (human papillomavirus) test positive 6/20/2017     Depressive disorder, not elsewhere classified      DVT (deep venous thrombosis) (H)      Dysmetabolic syndrome X      Esophageal reflux     GERD     Mild intermittent asthma      Other abnormal heart sounds     Heart murmur     Other specified types of schizophrenia, unspecified condition      Pancreatic cancer (H)     left adrenal gland, partial pancreas, and spleen removed; no chemo or radiation.      Type II or unspecified type diabetes mellitus without mention of complication, not stated as uncontrolled      Unspecified disorder of tympanic membrane             Past Surgical History:     Past Surgical History:   Procedure Laterality Date     ADRENALECTOMY       PANCREATECTOMY PARTIAL       SPLENECTOMY       SURGICAL HISTORY OF -   10/1/2000    Tympanoplasty     SURGICAL HISTORY OF -   10/1/2000    Tonsillectomy            Medications:     Current Outpatient Prescriptions   Medication     albuterol (PROAIR HFA/PROVENTIL HFA/VENTOLIN HFA) 108 (90 BASE) MCG/ACT Inhaler     amLODIPine (NORVASC) 5  MG tablet     ARIPiprazole (ABILIFY) 30 MG tablet     blood glucose monitoring (NO BRAND SPECIFIED) test strip     blood glucose monitoring (SOFTCLIX) lancets     Clozapine (CLOZARIL) 200 MG tablet     dulaglutide (TRULICITY) 1.5 MG/0.5ML pen     FLUoxetine (PROZAC) 20 MG capsule     insulin glargine (LANTUS SOLOSTAR) 100 UNIT/ML pen     lisinopril (PRINIVIL/ZESTRIL) 10 MG tablet     montelukast (SINGULAIR) 10 MG tablet     omeprazole (PRILOSEC) 20 MG CR capsule     ranitidine (ZANTAC) 300 MG tablet     ULTILET SHARPS CONTAINER 1QT MISC     No current facility-administered medications for this visit.             Allergies:     Allergies   Allergen Reactions     Latex             Social History:     Social History     Social History     Marital status: Single     Spouse name: N/A     Number of children: 0     Years of education: N/A     Occupational History     Not on file.     Social History Main Topics     Smoking status: Former Smoker     Packs/day: 1.00     Years: 4.00     Types: Cigarettes     Smokeless tobacco: Never Used      Comment: little less than a pack a day      Alcohol use No     Drug use: No     Sexual activity: Yes     Partners: Female      Comment: Both male and female      Other Topics Concern     Parent/Sibling W/ Cabg, Mi Or Angioplasty Before 65f 55m? No     Social History Narrative            Family History:     Family History   Problem Relation Age of Onset     Respiratory Mother      ASTHMA     Allergies Mother      Depression Mother      HEART DISEASE Father      HEART VALVE REPLACEMENT     Hypertension Father      Diabetes Father      Depression Father      Respiratory Brother      Allergies Brother      Respiratory Sister      Allergies Sister      Depression Sister      Respiratory Sister      Allergies Sister      Depression Sister      KIDNEY DISEASE No family hx of               Review of Systems:   10 points ROS was obtained and highlighted in the HPI, otherwise negative.           "Physical Exam:   VS:  /77  Pulse 76  Resp 18  Ht 1.6 m (5' 3\")  Wt 87.4 kg (192 lb 11.2 oz)  SpO2 97%  BMI 34.14 kg/m2      Gen: A&Ox3, NAD  HEENT: non-icteric, oropharynx clear  CV: RRR  Lung: CTAB  Abd: soft, NT, ND  Ext: no edema, intact pulses.   Skin: No stigmata of chronic liver disease.   Neuro: no focal deficits, grossly intact, no asterixis   Psych: appropriate mood and affects         Data:   Reviewed in person and significant for:  Lab Results   Component Value Date    WBC 14.6 06/21/2018     Lab Results   Component Value Date    RBC 4.02 06/21/2018     Lab Results   Component Value Date    HGB 12.4 06/21/2018     Lab Results   Component Value Date    HCT 39.6 06/21/2018     No components found for: MCT  Lab Results   Component Value Date    MCV 99 06/21/2018     Lab Results   Component Value Date    MCH 30.8 06/21/2018     Lab Results   Component Value Date    MCHC 31.3 06/21/2018     Lab Results   Component Value Date    RDW 13.6 06/21/2018     Lab Results   Component Value Date     06/21/2018     Last Basic Metabolic Panel:  Lab Results   Component Value Date     06/21/2018      Lab Results   Component Value Date    POTASSIUM 4.5 06/21/2018     Lab Results   Component Value Date    CHLORIDE 108 06/21/2018     Lab Results   Component Value Date    LCAY 8.8 06/21/2018     Lab Results   Component Value Date    CO2 24 06/21/2018     Lab Results   Component Value Date    BUN 30 06/21/2018     Lab Results   Component Value Date    CR 1.63 06/21/2018     Lab Results   Component Value Date     06/21/2018     Liver Function Studies -   Recent Labs   Lab Test  06/21/18   0802   PROTTOTAL  7.6   ALBUMIN  3.3*   BILITOTAL  0.3   ALKPHOS  161*   AST  41   ALT  68*       "

## 2018-06-28 NOTE — MR AVS SNAPSHOT
After Visit Summary   6/28/2018    Divina Delgadillo    MRN: [id number]           Patient Information     Date Of Birth          1985        Visit Information        Provider Department      6/28/2018 11:40 AM Jeremiah Perdomo MD [facility]        Today's Diagnoses     SAHNI (nonalcoholic steatohepatitis)    -  1       Follow-ups after your visit        Your next 10 appointments already scheduled     Jul 05, 2018  1:00 PM CDT   Diabetic Education with  DIABETIC ED RESOURCE   [facility] ([facility])    [address] [place] [address]   [phone]            Jul 10, 2018  9:30 AM CDT   LAB with LAB FIRST FLOOR [facility] ([facility])    [address] [place] [id number] [phone]           Please do not eat 10-12 hours before your appointment if you are coming in fasting for labs on lipids, cholesterol, or glucose (sugar). This does not apply to pregnant women. Water, hot tea and black coffee (with nothing added) are okay. Do not drink other fluids, diet soda or chew gum.            Jul 10, 2018 10:00 AM CDT   Return Visit with Sánchez Dias MD   [facility] ([facility])    [address] [place] [id number] [phone]            Jul 11, 2018  3:00 PM CDT   Telephone Visit with HEALTH  - [facility] 2   [facility] [place] ([facility])    [address] [facility]  [place] [address] [phone]           Note: this is not an onsite visit; there is no need to come to the facility.              Who to contact     If you have questions or need follow up information about today's clinic visit or your schedule please contact [facility] directly at [phone].  Normal or non-critical lab and imaging results will be communicated to you by MyChart, letter or phone within  "4 business days after the clinic has received the results. If you do not hear from us within 7 days, please contact the clinic through [employer] or phone. If you have a critical or abnormal lab result, we will notify you by phone as soon as possible.  Submit refill requests through [employer] or call your pharmacy and they will forward the refill request to us. Please allow 3 business days for your refill to be completed.          Additional Information About Your Visit        Care EveryWhere ID     This is your Care EveryWhere ID. This could be used by other organizations to access your [facility] medical records  [id number]        Your Vitals Were     Pulse Respirations Height Pulse Oximetry BMI (Body Mass Index)       76 18 1.6 m (5' 3\") 97% 34.14 kg/m2        Blood Pressure from Last 3 Encounters:   06/28/18 139/77   06/14/18 108/65   05/23/18 122/65    Weight from Last 3 Encounters:   06/28/18 87.4 kg (192 lb 11.2 oz)   06/14/18 85.5 kg (188 lb 9.6 oz)   05/23/18 86.5 kg (190 lb 9.6 oz)              Today, you had the following     No orders found for display       Primary Care Provider Office Phone # Fax #    Jaleesa Watson VERONIQUE Velasquez [phone] [address] [id number]        Equal Access to Services     FANG HAY : Hadii aad ku hadasho Soomaali, waaxda luqadaha, qaybta kaalmada adeegyada, aaron munoz. So [facility] [phone].    ATENCIÓN: Si habla español, tiene a gil disposición servicios gratuitos de asistencia lingüística. Llame al [phone].    We comply with applicable federal civil rights laws and [place] laws. We do not discriminate on the basis of race, color, national origin, age, disability, sex, sexual orientation, or gender identity.            Thank you!     Thank you for choosing [facility]  for your care. Our goal is always to provide you with excellent care. Hearing back from our patients is one way we can " continue to improve our services. Please take a few minutes to complete the written survey that you may receive in the mail after your visit with us. Thank you!             Your Updated Medication List - Protect others around you: Learn how to safely use, store and throw away your medicines at www.disposemymeds.org.          This list is accurate as of 6/28/18 11:59 PM.  Always use your most recent med list.                   Brand Name Dispense Instructions for use Diagnosis    ABILIFY 30 MG tablet   Generic drug:  ARIPiprazole      Take 30 mg by mouth daily        albuterol 108 (90 Base) MCG/ACT Inhaler    PROAIR HFA/PROVENTIL HFA/VENTOLIN HFA    1 Inhaler    Inhale 2 puffs into the lungs every 6 hours as needed for shortness of breath / dyspnea or wheezing    Mild intermittent asthma with acute exacerbation       amLODIPine 5 MG tablet    NORVASC    90 tablet    Take 1 tablet (5 mg) by mouth daily    Essential hypertension       blood glucose monitoring lancets     100 each    Use to test blood sugar 3 times daily    Type 2 diabetes mellitus with stage 3 chronic kidney disease, with long-term current use of insulin (H)       blood glucose monitoring test strip    no brand specified    100 each    1 strip by In Vitro route 3 times daily    Type 2 diabetes mellitus with stage 3 chronic kidney disease, with long-term current use of insulin (H)       Clozapine 200 MG tablet    CLOZARIL     Take 200 mg by mouth 2 times daily        dulaglutide 1.5 MG/0.5ML pen    TRULICITY    6 mL    Inject 1.5 mg Subcutaneous every 7 days    Type 2 diabetes mellitus with stage 3 chronic kidney disease, without long-term current use of insulin (H)       FLUoxetine 20 MG capsule    PROzac     Take 20 mg by mouth daily        insulin glargine 100 UNIT/ML injection    LANTUS SOLOSTAR    15 mL    Inject 22 units daily    Type 2 diabetes mellitus with stage 3 chronic kidney disease, with long-term current use of insulin (H)        lisinopril 10 MG tablet    PRINIVIL/ZESTRIL    90 tablet    Take 0.5 tablets (5 mg) by mouth daily    Essential hypertension       montelukast 10 MG tablet    SINGULAIR    90 tablet    Take 1 tablet (10 mg) by mouth At Bedtime    Mild intermittent asthma with acute exacerbation       omeprazole 20 MG CR capsule    priLOSEC    60 capsule    Take 1 capsule (20 mg) by mouth daily 1 capsule bid    Gastroesophageal reflux disease without esophagitis       ranitidine 300 MG tablet    ZANTAC    90 tablet    Take 1 tablet (300 mg) by mouth At Bedtime    Gastroesophageal reflux disease without esophagitis       ULTILET SHARPS CONTAINER 1QT Misc     1 each    1 Device as needed    Type 2 diabetes mellitus with stage 3 chronic kidney disease, without long-term current use of insulin (H)

## 2018-06-28 NOTE — NURSING NOTE
"Divina Delgadillo's goals for this visit include: follow up  She requests these members of her care team be copied on today's visit information:     PCP: Jaleesa Velasquez    Referring Provider:  No referring provider defined for this encounter.    /77  Pulse 76  Resp 18  Ht 1.6 m (5' 3\")  Wt 87.4 kg (192 lb 11.2 oz)  SpO2 97%  BMI 34.14 kg/m2    Do you need any medication refills at today's visit?   "

## 2018-07-05 ENCOUNTER — ALLIED HEALTH/NURSE VISIT (OUTPATIENT)
Dept: EDUCATION SERVICES | Facility: CLINIC | Age: 33
End: 2018-07-05
Payer: MEDICARE

## 2018-07-05 ENCOUNTER — TELEPHONE (OUTPATIENT)
Dept: EDUCATION SERVICES | Facility: CLINIC | Age: 33
End: 2018-07-05

## 2018-07-05 DIAGNOSIS — Z79.4 TYPE 2 DIABETES MELLITUS WITH STAGE 3 CHRONIC KIDNEY DISEASE, WITH LONG-TERM CURRENT USE OF INSULIN (H): Primary | ICD-10-CM

## 2018-07-05 DIAGNOSIS — E11.22 TYPE 2 DIABETES MELLITUS WITH STAGE 3 CHRONIC KIDNEY DISEASE, WITH LONG-TERM CURRENT USE OF INSULIN (H): Primary | ICD-10-CM

## 2018-07-05 DIAGNOSIS — N18.30 TYPE 2 DIABETES MELLITUS WITH STAGE 3 CHRONIC KIDNEY DISEASE, WITH LONG-TERM CURRENT USE OF INSULIN (H): Primary | ICD-10-CM

## 2018-07-05 PROCEDURE — 99207 ZZC DROP WITH A PROCEDURE: CPT

## 2018-07-05 PROCEDURE — 95250 CONT GLUC MNTR PHYS/QHP EQP: CPT

## 2018-07-05 NOTE — MR AVS SNAPSHOT
After Visit Summary   7/5/2018    Divina Delgadillo    MRN: [id number]           Patient Information     Date Of Birth          1985        Visit Information        Provider Department      7/5/2018 1:00 PM  DIABETIC ED RESOURCE [facility] Instructions    1. Plan to wear the LibrePro sensor for 14 days. It is okay to shower, bathe, and swim (up to 3 feet deep for 30 minutes)    2. Continue with your usual diabetes care plan - check blood sugars and take medicines, as prescribed.    3. Keep a log of what you eat and drink, when you take your medications and how much you take, and exercise you do while you are wearing the sensor.    3. Do not cover the sensor with extra adhesive (the small hole in the center of the sensor must remain uncovered)    4. Use a little extra care, especially when getting dressed or exercising, to avoid accidentally loosening or removing the sensor.     5. Remove the sensor if you need to have an MRI or CT scan.       If sensor does not fall off prematurely, On Thursday July 19th - take sensor off after 3pm & put in a sandwich bag  Follow up in [place] Wednesday July 25th at 3pm & bring food records       [facility] for the [facility]:  For Your Diabetes Education and Nutrition Appointments Call:  [phone]   For Diabetes Education or Nutrition Related Questions:   Phone: [phone]  E-mail: [email or web address]  Fax: [phone]   If you need a medication refill please contact your pharmacy. Please allow 3 business days for your refills to be completed.    Instructions for emailing the Diabetes Educators    If you need to communicate a non-urgent message to a Diabetes Educator via email, please send to [email or web address].    Please follow the following email guidelines:    Subject line: Secure: your clinic name (example: Secure: [facility])  In the email please include:  First name, middle initial, last name and date of birth.    We will be in touch with you within one (1) business day.              Follow-ups after your visit        Your next 10 appointments already scheduled     Jul 10, 2018  9:30 AM CDT   LAB with LAB FIRST FLOOR [facility] ([facility])    [address] [place] [id number]   [phone]           Please do not eat 10-12 hours before your appointment if you are coming in fasting for labs on lipids, cholesterol, or glucose (sugar). This does not apply to pregnant women. Water, hot tea and black coffee (with nothing added) are okay. Do not drink other fluids, diet soda or chew gum.            Jul 10, 2018 10:00 AM CDT   Return Visit with Sánchez Dias MD   [facility] ([facility])    [address] [place] [address]   [phone]            Jul 11, 2018  3:00 PM CDT   Telephone Visit with Mahin Penny   [facility] [place] ([facility])    [address] [facility] [id number] [phone]           Note: this is not an onsite visit; there is no need to come to the facility.            Jul 25, 2018  3:00 PM CDT   Diabetic Education with [place] DIABETES ED RESOURCE   [facility] [place] ([facility])    [address] [facility] [address]   [phone]              Who to contact     If you have questions or need follow up information about today's clinic visit or your schedule please contact [facility] directly at [phone].  Normal or non-critical lab and imaging results will be communicated to you by MyChart, letter or phone within 4 business days after the clinic has received the results. If you do not hear from us within 7 days, please contact the clinic through MyChart or phone. If you have a critical or abnormal lab result, we will notify you by phone as soon  as possible.  Submit refill requests through [employer] or call your pharmacy and they will forward the refill request to us. Please allow 3 business days for your refill to be completed.          Additional Information About Your Visit        Care EveryWhere ID     This is your Care EveryWhere ID. This could be used by other organizations to access your [facility] medical records  [id number]         Blood Pressure from Last 3 Encounters:   06/28/18 139/77   06/14/18 108/65   05/23/18 122/65    Weight from Last 3 Encounters:   06/28/18 87.4 kg (192 lb 11.2 oz)   06/14/18 85.5 kg (188 lb 9.6 oz)   05/23/18 86.5 kg (190 lb 9.6 oz)              Today, you had the following     No orders found for display       Primary Care Provider Office Phone # Fax #    VERONIQUE Bledsoe [phone] [address] [facility] [address]        Equal Access to Services     FANG HAY : Hadii aad ku hadasho Soomaali, waaxda luqadaha, qaybta kaalmada adeegyada, waxay idiin hayaan neha cheathamarabambi echeverria . So [facility] [phone].    ATENCIÓN: Si habla español, tiene a gil disposición servicios gratuitos de asistencia lingüística. Llame al [phone].    We comply with applicable federal civil rights laws and [place] laws. We do not discriminate on the basis of race, color, national origin, age, disability, sex, sexual orientation, or gender identity.            Thank you!     Thank you for choosing [place] [facility]  for your care. Our goal is always to provide you with excellent care. Hearing back from our patients is one way we can continue to improve our services. Please take a few minutes to complete the written survey that you may receive in the mail after your visit with us. Thank you!             Your Updated Medication List - Protect others around you: Learn how to safely use, store and throw away your medicines at www.disposemymeds.org.          This list is accurate as of  7/5/18  1:32 PM.  Always use your most recent med list.                   Brand Name Dispense Instructions for use Diagnosis    ABILIFY 30 MG tablet   Generic drug:  ARIPiprazole      Take 30 mg by mouth daily        albuterol 108 (90 Base) MCG/ACT Inhaler    PROAIR HFA/PROVENTIL HFA/VENTOLIN HFA    1 Inhaler    Inhale 2 puffs into the lungs every 6 hours as needed for shortness of breath / dyspnea or wheezing    Mild intermittent asthma with acute exacerbation       amLODIPine 5 MG tablet    NORVASC    90 tablet    Take 1 tablet (5 mg) by mouth daily    Essential hypertension       blood glucose monitoring lancets     100 each    Use to test blood sugar 3 times daily    Type 2 diabetes mellitus with stage 3 chronic kidney disease, with long-term current use of insulin (H)       blood glucose monitoring test strip    no brand specified    100 each    1 strip by In Vitro route 3 times daily    Type 2 diabetes mellitus with stage 3 chronic kidney disease, with long-term current use of insulin (H)       Clozapine 200 MG tablet    CLOZARIL     Take 200 mg by mouth 2 times daily        dulaglutide 1.5 MG/0.5ML pen    TRULICITY    6 mL    Inject 1.5 mg Subcutaneous every 7 days    Type 2 diabetes mellitus with stage 3 chronic kidney disease, without long-term current use of insulin (H)       FLUoxetine 20 MG capsule    PROzac     Take 20 mg by mouth daily        insulin glargine 100 UNIT/ML injection    LANTUS SOLOSTAR    15 mL    Inject 22 units daily    Type 2 diabetes mellitus with stage 3 chronic kidney disease, with long-term current use of insulin (H)       lisinopril 10 MG tablet    PRINIVIL/ZESTRIL    90 tablet    Take 0.5 tablets (5 mg) by mouth daily    Essential hypertension       montelukast 10 MG tablet    SINGULAIR    90 tablet    Take 1 tablet (10 mg) by mouth At Bedtime    Mild intermittent asthma with acute exacerbation       omeprazole 20 MG CR capsule    priLOSEC    60 capsule    Take 1 capsule (20 mg)  by mouth daily 1 capsule bid    Gastroesophageal reflux disease without esophagitis       ranitidine 300 MG tablet    ZANTAC    90 tablet    Take 1 tablet (300 mg) by mouth At Bedtime    Gastroesophageal reflux disease without esophagitis       ULTILET SHARPS CONTAINER 1QT Misc     1 each    1 Device as needed    Type 2 diabetes mellitus with stage 3 chronic kidney disease, without long-term current use of insulin (H)

## 2018-07-05 NOTE — TELEPHONE ENCOUNTER
Ryan Lazcano,     Please note if you approve of this plan or indicate an alternate plan.     May I try ordering the personal FreeStyle Yash continuous glucose monitor for Divina?       Thanks!  Katiana Riley RD, LD, CDE  Diabetes Education

## 2018-07-05 NOTE — PATIENT INSTRUCTIONS
1. Plan to wear the LibrePro sensor for 14 days. It is okay to shower, bathe, and swim (up to 3 feet deep for 30 minutes)    2. Continue with your usual diabetes care plan - check blood sugars and take medicines, as prescribed.    3. Keep a log of what you eat and drink, when you take your medications and how much you take, and exercise you do while you are wearing the sensor.    3. Do not cover the sensor with extra adhesive (the small hole in the center of the sensor must remain uncovered)    4. Use a little extra care, especially when getting dressed or exercising, to avoid accidentally loosening or removing the sensor.     5. Remove the sensor if you need to have an MRI or CT scan.       If sensor does not fall off prematurely, On Thursday July 19th - take sensor off after 3pm & put in a sandwich bag  Follow up in [place] Wednesday July 25th at 3pm & bring food records       [facility] for [facility] [place] [facility]:  For Your Diabetes Education and Nutrition Appointments Call:  [phone]   For Diabetes Education or Nutrition Related Questions:   Phone: [phone]  E-mail: [email or web address]  Fax: [phone]   If you need a medication refill please contact your pharmacy. Please allow 3 business days for your refills to be completed.    Instructions for emailing the Diabetes Educators    If you need to communicate a non-urgent message to a Diabetes Educator via email, please send to [email or web address].    Please follow the following email guidelines:    Subject line: Secure: your clinic name (example: Secure: [facility])  In the email please include: First name, middle initial, last name and date of birth.    We will be in touch with you within one (1) business day.

## 2018-07-05 NOTE — PROGRESS NOTES
Diabetes Self Management Training: Professional Continuous Glucose Monitor Insertion    SUBJECTIVE:   Divina Delgadillo is accompanied by self and mother    Patient concerns: had a few off days this month with falling off track    OBJECTIVE:    Lab Results:  Lab Results   Component Value Date    A1C 7.2 06/21/2018      Lab Results   Component Value Date     06/21/2018     Lab Results   Component Value Date    HDL 57 06/21/2018       Vitals:   Wt Readings from Last 5 Encounters:   06/28/18 87.4 kg (192 lb 11.2 oz)   06/14/18 85.5 kg (188 lb 9.6 oz)   05/23/18 86.5 kg (190 lb 9.6 oz)   05/23/18 85.5 kg (188 lb 8 oz)   05/01/18 87.5 kg (193 lb)       ASSESSMENT:  LibrePro sensor started today. (Lot # [id number], Serial # [id number], Expiration date 10/31/2018) Sensor was inserted with no resistance or bleeding at insertion site.    Pt will plan to wear the sensor through  07/19/2018    Will see if a personal continuous glucose monitor will run through [facility] if PCP in agreement; jana in [facility] for [employer] if tyring this.      WRITTEN AND VERBAL INFORMATION GIVEN TO SUPPORT UNDERSTANDING OF:  LibrePro CGM: Sensor insertion, intention of monitoring for 14 days. Keep records of BG, food intake, exercise, and medication dosing during wear.       Patient verbalizes understanding of how to remove sensor and all instructions provided.     Educational and other materials:  Food/exercise/medication log sheets  Contact information  Return Check List    PLAN:  Pt was given instructions for tracking BG, medications, food intake and activity.    See Patient Instructions, AVS printed and provided to patient today.    Follow-up:    Patient to return all items associated with professional Continuous Glucose Monitor System to the [facility] by 7/25.     Dolly Riley RD, LD, CDE  Diabetes Education    Time Spent: 30 minutes  Encounter Type: Individual

## 2018-07-10 ENCOUNTER — OFFICE VISIT (OUTPATIENT)
Dept: NEPHROLOGY | Facility: CLINIC | Age: 33
End: 2018-07-10
Payer: MEDICARE

## 2018-07-10 VITALS
DIASTOLIC BLOOD PRESSURE: 74 MMHG | HEART RATE: 95 BPM | OXYGEN SATURATION: 99 % | SYSTOLIC BLOOD PRESSURE: 129 MMHG | BODY MASS INDEX: 34.01 KG/M2 | WEIGHT: 192 LBS

## 2018-07-10 DIAGNOSIS — E11.22 TYPE 2 DIABETES MELLITUS WITH STAGE 3 CHRONIC KIDNEY DISEASE, WITH LONG-TERM CURRENT USE OF INSULIN (H): ICD-10-CM

## 2018-07-10 DIAGNOSIS — D72.829 LEUKOCYTOSIS, UNSPECIFIED TYPE: Primary | ICD-10-CM

## 2018-07-10 DIAGNOSIS — N18.30 TYPE 2 DIABETES MELLITUS WITH STAGE 3 CHRONIC KIDNEY DISEASE, WITH LONG-TERM CURRENT USE OF INSULIN (H): ICD-10-CM

## 2018-07-10 DIAGNOSIS — Z79.4 TYPE 2 DIABETES MELLITUS WITH STAGE 3 CHRONIC KIDNEY DISEASE, WITH LONG-TERM CURRENT USE OF INSULIN (H): ICD-10-CM

## 2018-07-10 DIAGNOSIS — I10 ESSENTIAL HYPERTENSION: ICD-10-CM

## 2018-07-10 DIAGNOSIS — N18.30 CKD (CHRONIC KIDNEY DISEASE) STAGE 3, GFR 30-59 ML/MIN (H): ICD-10-CM

## 2018-07-10 DIAGNOSIS — Z79.899 MEDICATION MANAGEMENT: ICD-10-CM

## 2018-07-10 DIAGNOSIS — F25.0 SCHIZOAFFECTIVE DISORDER, BIPOLAR TYPE (H): ICD-10-CM

## 2018-07-10 LAB
ALBUMIN SERPL-MCNC: 3.7 G/DL (ref 3.4–5)
ANION GAP SERPL CALCULATED.3IONS-SCNC: 6 MMOL/L (ref 3–14)
BASOPHILS # BLD AUTO: 0.1 10E9/L (ref 0–0.2)
BASOPHILS NFR BLD AUTO: 0.8 %
BUN SERPL-MCNC: 39 MG/DL (ref 7–30)
CALCIUM SERPL-MCNC: 9.1 MG/DL (ref 8.5–10.1)
CHLORIDE SERPL-SCNC: 111 MMOL/L (ref 94–109)
CO2 SERPL-SCNC: 24 MMOL/L (ref 20–32)
CREAT SERPL-MCNC: 1.92 MG/DL (ref 0.52–1.04)
CREAT UR-MCNC: 128 MG/DL
DIFFERENTIAL METHOD BLD: ABNORMAL
EOSINOPHIL # BLD AUTO: 0.3 10E9/L (ref 0–0.7)
EOSINOPHIL NFR BLD AUTO: 1.9 %
ERYTHROCYTE [DISTWIDTH] IN BLOOD BY AUTOMATED COUNT: 14 % (ref 10–15)
GFR SERPL CREATININE-BSD FRML MDRD: 30 ML/MIN/1.7M2
GLUCOSE SERPL-MCNC: 155 MG/DL (ref 70–99)
HCT VFR BLD AUTO: 41.8 % (ref 35–47)
HGB BLD-MCNC: 12.9 G/DL (ref 11.7–15.7)
IMM GRANULOCYTES # BLD: 0.2 10E9/L (ref 0–0.4)
IMM GRANULOCYTES NFR BLD: 1.3 %
LYMPHOCYTES # BLD AUTO: 4 10E9/L (ref 0.8–5.3)
LYMPHOCYTES NFR BLD AUTO: 29.7 %
MCH RBC QN AUTO: 30.6 PG (ref 26.5–33)
MCHC RBC AUTO-ENTMCNC: 30.9 G/DL (ref 31.5–36.5)
MCV RBC AUTO: 99 FL (ref 78–100)
MONOCYTES # BLD AUTO: 1 10E9/L (ref 0–1.3)
MONOCYTES NFR BLD AUTO: 7.3 %
NEUTROPHILS # BLD AUTO: 7.9 10E9/L (ref 1.6–8.3)
NEUTROPHILS NFR BLD AUTO: 59 %
PHOSPHATE SERPL-MCNC: 3 MG/DL (ref 2.5–4.5)
PLATELET # BLD AUTO: 379 10E9/L (ref 150–450)
POTASSIUM SERPL-SCNC: 4.6 MMOL/L (ref 3.4–5.3)
PROT UR-MCNC: 0.27 G/L
PROT/CREAT 24H UR: 0.21 G/G CR (ref 0–0.2)
PTH-INTACT SERPL-MCNC: 54 PG/ML (ref 18–80)
RBC # BLD AUTO: 4.22 10E12/L (ref 3.8–5.2)
SODIUM SERPL-SCNC: 141 MMOL/L (ref 133–144)
WBC # BLD AUTO: 13.4 10E9/L (ref 4–11)

## 2018-07-10 PROCEDURE — 99214 OFFICE O/P EST MOD 30 MIN: CPT | Performed by: INTERNAL MEDICINE

## 2018-07-10 PROCEDURE — 36415 COLL VENOUS BLD VENIPUNCTURE: CPT | Performed by: CLINICAL NURSE SPECIALIST

## 2018-07-10 PROCEDURE — 84156 ASSAY OF PROTEIN URINE: CPT | Performed by: INTERNAL MEDICINE

## 2018-07-10 PROCEDURE — 83970 ASSAY OF PARATHORMONE: CPT | Performed by: CLINICAL NURSE SPECIALIST

## 2018-07-10 PROCEDURE — 85025 COMPLETE CBC W/AUTO DIFF WBC: CPT | Performed by: CLINICAL NURSE SPECIALIST

## 2018-07-10 PROCEDURE — 80069 RENAL FUNCTION PANEL: CPT | Performed by: CLINICAL NURSE SPECIALIST

## 2018-07-10 RX ORDER — LISINOPRIL 5 MG/1
5 TABLET ORAL DAILY
Qty: 90 TABLET | Refills: 3 | Status: SHIPPED | OUTPATIENT
Start: 2018-07-10 | End: 2019-01-14

## 2018-07-10 ASSESSMENT — PAIN SCALES - GENERAL: PAINLEVEL: NO PAIN (0)

## 2018-07-10 NOTE — LETTER
July 13, 2018      Divina Delgadillo  [address] [address] [place] [address]              Dear Divina,        Your most recent lab results are attached.  Your urine protein level remains abnormal but at a low level. We will continue the lisinopril at 5 mg daily as we discussed at your visit. Great job with your diabetes cares most recently! Unfortunately, your creatinine was a bit higher on this test but your labs suggest you may have been slightly dehydrated as well. I think before we make any adjustments, I recommend repeating the test. I will place this order and it can be done at the lab closest to you ([place] or [facility]).     Sincerely,     Sánchez Dias, DO  KW/gw       Component      Latest Ref Rng & Units 7/10/2018   Sodium      133 - 144 mmol/L 141   Potassium      3.4 - 5.3 mmol/L 4.6   Chloride      94 - 109 mmol/L 111 (H)   Carbon Dioxide      20 - 32 mmol/L 24   Anion Gap      3 - 14 mmol/L 6   Glucose      70 - 99 mg/dL 155 (H)   Urea Nitrogen      7 - 30 mg/dL 39 (H)   Creatinine      0.52 - 1.04 mg/dL 1.92 (H)   GFR Estimate      >60 mL/min/1.7m2 30 (L)   GFR Estimate If Black      >60 mL/min/1.7m2 37 (L)   Calcium      8.5 - 10.1 mg/dL 9.1   Phosphorus      2.5 - 4.5 mg/dL 3.0   Albumin      3.4 - 5.0 g/dL 3.7   Protein Random Urine      g/L 0.27   Protein Total Urine g/gr Creatinine      0 - 0.2 g/g Cr 0.21 (H)   Parathyroid Hormone Intact      18 - 80 pg/mL 54   Creatinine Urine      mg/dL 128

## 2018-07-10 NOTE — NURSING NOTE
Divina Delgadillo's goals for this visit include:   Chief Complaint   Patient presents with     RECHECK     6mo recheck CKD       She requests these members of her care team be copied on today's visit information: no    PCP: Jaleesa Velasquez    Referring Provider:  No referring provider defined for this encounter.    /74 (BP Location: Right arm, Patient Position: Sitting, Cuff Size: Adult Regular)  Pulse 95  Wt 87.1 kg (192 lb)  SpO2 99%  BMI 34.01 kg/m2    Do you need any medication refills at today's visit? No    Dionne Fuller LPN

## 2018-07-10 NOTE — PATIENT INSTRUCTIONS
1 Recommend hematology referral  2 New script for the 5 mg lisinopril tabs sent to pharmacy  3 Trial moving the amlodipine to being taken at night.   4 six month follow-up.

## 2018-07-10 NOTE — MR AVS SNAPSHOT
After Visit Summary   7/10/2018    Divina Delgadillo    MRN: [id number]           Patient Information     Date Of Birth          1985        Visit Information        Provider Department      7/10/2018 10:00 AM Sánchez Dias MD [facility]        Today's Diagnoses     Leukocytosis, unspecified type    -  1    Essential hypertension          Care Instructions    1 Recommend hematology referral  2 New script for the 5 mg lisinopril tabs sent to pharmacy  3 Trial moving the amlodipine to being taken at night.   4 six month follow-up.           Follow-ups after your visit        Additional Services     ONC/HEME ADULT REFERRAL       Your provider has referred you to: [facility]: Cancer Care/Hematology (All Cancer Related Services) - [place] [phone]   [email or web address]    Leukocytosis of unclear etiology    Please be aware that coverage of these services is subject to the terms and limitations of your health insurance plan.  Call member services at your health plan with any benefit or coverage questions.      Please bring the following with you to your appointment:    (1) Any X-Rays, CTs or MRIs which have been performed.  Contact the facility where they were done to arrange for  prior to your scheduled appointment.   (2) List of current medications  (3) This referral request   (4) Any documents/labs given to you for this referral                  Your next 10 appointments already scheduled     Jul 11, 2018  3:00 PM CDT   Telephone Visit with Mahin Penny   [facility] ([facility]    [address] [facility] [address] [phone]           Note: this is not an onsite visit; there is no need to come to the facility.            Jul 25, 2018  3:00 PM CDT   Diabetic Education with [place] DIABETES ED RESOURCE   [facility] [place] ([facility])    [address]  [facility] [address]   [phone]            Jan 14, 2019 10:00 AM CST   LAB with LAB FIRST FLOOR [facility] ([facility])    [address] [place] [id number] [phone]           Please do not eat 10-12 hours before your appointment if you are coming in fasting for labs on lipids, cholesterol, or glucose (sugar). This does not apply to pregnant women. Water, hot tea and black coffee (with nothing added) are okay. Do not drink other fluids, diet soda or chew gum.            Jan 14, 2019 10:30 AM CST   Return Visit with Sánchez Dias MD   [facility] ([facility])    [address] [place] [address] [phone]              Who to contact     If you have questions or need follow up information about today's clinic visit or your schedule please contact [facility] directly at [phone].  Normal or non-critical lab and imaging results will be communicated to you by MyChart, letter or phone within 4 business days after the clinic has received the results. If you do not hear from us within 7 days, please contact the clinic through MyChart or phone. If you have a critical or abnormal lab result, we will notify you by phone as soon as possible.  Submit refill requests through [email or web address] or call your pharmacy and they will forward the refill request to us. Please allow 3 business days for your refill to be completed.          Additional Information About Your Visit        Care EveryWhere ID     This is your Care EveryWhere ID. This could be used by other organizations to access your [facility] medical records  [id number]        Your Vitals Were     Pulse Pulse Oximetry BMI (Body Mass Index)             95 99% 34.01 kg/m2          Blood Pressure from Last 3 Encounters:   07/10/18 129/74   06/28/18 139/77   06/14/18 108/65    Weight from Last 3 Encounters:   07/10/18 87.1 kg (192 lb)    06/28/18 87.4 kg (192 lb 11.2 oz)   06/14/18 85.5 kg (188 lb 9.6 oz)              We Performed the Following     ONC/HEME ADULT REFERRAL          Today's Medication Changes          These changes are accurate as of 7/10/18 10:39 AM.  If you have any questions, ask your nurse or doctor.               These medicines have changed or have updated prescriptions.        Dose/Directions    lisinopril 5 MG tablet   Commonly known as:  PRINIVIL/ZESTRIL   This may have changed:  medication strength   Used for:  Essential hypertension   Changed by:  Sánchez Dias MD        Dose:  5 mg   Take 1 tablet (5 mg) by mouth daily   Quantity:  90 tablet   Refills:  3            Where to get your medicines      These medications were sent to [facility] [id number] [place] [id number]    Hours:  [facility] Phone:  [phone]     lisinopril 5 MG tablet                Primary Care Provider Office Phone # Fax #    Jaleesa Watson Eva, APRN [phone] [address] [facility] [address]        Equal Access to Services     FANG HAY AH: Hadii juan manuel moreno hadasho Soomaali, waaxda luqadaha, qaybta kaalmada adeegyada, aaron munoz. So [facility] [phone].    ATENCIÓN: Si habla español, tiene a gil disposición servicios gratuitos de asistencia lingüística. [facility] [phone].    We comply with applicable federal civil rights laws and [place] laws. We do not discriminate on the basis of race, color, national origin, age, disability, sex, sexual orientation, or gender identity.            Thank you!     Thank you for choosing [facility]  for your care. Our goal is always to provide you with excellent care. Hearing back from our patients is one way we can continue to improve our services. Please take a few minutes to complete the written survey that you may receive in the  mail after your visit with us. Thank you!             Your Updated Medication List - Protect others around you: Learn how to safely use, store and throw away your medicines at www.disposemymeds.org.          This list is accurate as of 7/10/18 10:39 AM.  Always use your most recent med list.                   Brand Name Dispense Instructions for use Diagnosis    ABILIFY 30 MG tablet   Generic drug:  ARIPiprazole      Take 30 mg by mouth daily        albuterol 108 (90 Base) MCG/ACT Inhaler    PROAIR HFA/PROVENTIL HFA/VENTOLIN HFA    1 Inhaler    Inhale 2 puffs into the lungs every 6 hours as needed for shortness of breath / dyspnea or wheezing    Mild intermittent asthma with acute exacerbation       amLODIPine 5 MG tablet    NORVASC    90 tablet    Take 1 tablet (5 mg) by mouth daily    Essential hypertension       blood glucose monitoring lancets     100 each    Use to test blood sugar 3 times daily    Type 2 diabetes mellitus with stage 3 chronic kidney disease, with long-term current use of insulin (H)       blood glucose monitoring test strip    no brand specified    100 each    1 strip by In Vitro route 3 times daily    Type 2 diabetes mellitus with stage 3 chronic kidney disease, with long-term current use of insulin (H)       Clozapine 200 MG tablet    CLOZARIL     Take 200 mg by mouth 2 times daily        dulaglutide 1.5 MG/0.5ML pen    TRULICITY    6 mL    Inject 1.5 mg Subcutaneous every 7 days    Type 2 diabetes mellitus with stage 3 chronic kidney disease, without long-term current use of insulin (H)       FLUoxetine 20 MG capsule    PROzac     Take 20 mg by mouth daily        insulin glargine 100 UNIT/ML injection    LANTUS SOLOSTAR    15 mL    Inject 22 units daily    Type 2 diabetes mellitus with stage 3 chronic kidney disease, with long-term current use of insulin (H)       lisinopril 5 MG tablet    PRINIVIL/ZESTRIL    90 tablet    Take 1 tablet (5 mg) by mouth daily    Essential hypertension        montelukast 10 MG tablet    SINGULAIR    90 tablet    Take 1 tablet (10 mg) by mouth At Bedtime    Mild intermittent asthma with acute exacerbation       omeprazole 20 MG CR capsule    priLOSEC    60 capsule    Take 1 capsule (20 mg) by mouth daily 1 capsule bid    Gastroesophageal reflux disease without esophagitis       ranitidine 300 MG tablet    ZANTAC    90 tablet    Take 1 tablet (300 mg) by mouth At Bedtime    Gastroesophageal reflux disease without esophagitis       ULTILET SHARPS CONTAINER 1QT Misc     1 each    1 Device as needed    Type 2 diabetes mellitus with stage 3 chronic kidney disease, without long-term current use of insulin (H)

## 2018-07-11 ENCOUNTER — VIRTUAL VISIT (OUTPATIENT)
Dept: NURSING | Facility: CLINIC | Age: 33
End: 2018-07-11

## 2018-07-11 ENCOUNTER — TELEPHONE (OUTPATIENT)
Dept: NEPHROLOGY | Facility: CLINIC | Age: 33
End: 2018-07-11

## 2018-07-11 DIAGNOSIS — E11.22 TYPE 2 DIABETES MELLITUS WITH STAGE 3 CHRONIC KIDNEY DISEASE, WITH LONG-TERM CURRENT USE OF INSULIN (H): Primary | ICD-10-CM

## 2018-07-11 DIAGNOSIS — N18.30 TYPE 2 DIABETES MELLITUS WITH STAGE 3 CHRONIC KIDNEY DISEASE, WITH LONG-TERM CURRENT USE OF INSULIN (H): Primary | ICD-10-CM

## 2018-07-11 DIAGNOSIS — Z79.4 TYPE 2 DIABETES MELLITUS WITH STAGE 3 CHRONIC KIDNEY DISEASE, WITH LONG-TERM CURRENT USE OF INSULIN (H): Primary | ICD-10-CM

## 2018-07-11 PROCEDURE — 99207 ZZC HEALTH COACHING, NO CHARGE: CPT

## 2018-07-11 NOTE — TELEPHONE ENCOUNTER
Result note per Dr. Dias below. Dionne, could you reach out to patient? Thank you- Shelley Vail, RN      Your most recent lab results are attached.  Your urine protein level remains abnormal but at a low level. We will continue the lisinopril at 5 mg daily as we discussed at your visit. Great job with your diabetes cares most recently! Unfortunately, your creatinine was a bit higher on this test but your labs suggest you may have been slightly dehydrated as well. I think before we make any adjustments, I recommend repeating the test. I will place this order and it can be done at the lab closest to you ([facility]).     Sincerely,   Sánchez Dias, DO     Team: I placed the order but patient will need to be updated - she had forgotten to get labs done and so didn't have results at time of visit. Thanks, KW

## 2018-07-11 NOTE — MR AVS SNAPSHOT
After Visit Summary   7/11/2018    Divina Delgadillo    MRN: [id number]           Patient Information     Date Of Birth          1985        Visit Information        Provider Department      7/11/2018 3:00 PM Mahin Penny [facility]        Today's Diagnoses     Type 2 diabetes mellitus with stage 3 chronic kidney disease, with long-term current use of insulin (H)    -  1       Follow-ups after your visit        Follow-up notes from your care team     Return in 4 weeks (on 8/8/2018).      Your next 10 appointments already scheduled     Jul 25, 2018  3:00 PM CDT   Diabetic Education with [place] DIABETES ED RESOURCE   [facility] [place] ([facility])    [address] [facility] [address]   [phone]            Aug 01, 2018  1:00 PM CDT   New Visit with Meghan Rosenthal MD   [facility] ([facility])    [facility]  [address] [facility] [address]  [place] [address]   [phone]            Jan 14, 2019 10:00 AM CST   LAB with LAB FIRST FLOOR [facility] ([facility])    [address] [place] [id number] [phone]           Please do not eat 10-12 hours before your appointment if you are coming in fasting for labs on lipids, cholesterol, or glucose (sugar). This does not apply to pregnant women. Water, hot tea and black coffee (with nothing added) are okay. Do not drink other fluids, diet soda or chew gum.            Jan 14, 2019 10:30 AM CST   Return Visit with Sánchez Dias MD   [facility] ([facility])    [address] [place] [address] [phone]              Future tests that were ordered for you today     Open Future Orders        Priority Expected Expires Ordered    Basic metabolic panel Routine 7/17/2018 7/10/2019 7/10/2018            Who to contact     If you have questions or need  follow up information about today's clinic visit or your schedule please contact [facility] directly at [phone].  Normal or non-critical lab and imaging results will be communicated to you by MyChart, letter or phone within 4 business days after the clinic has received the results. If you do not hear from us within 7 days, please contact the clinic through MyChart or phone. If you have a critical or abnormal lab result, we will notify you by phone as soon as possible.  Submit refill requests through Databan[employer] or call your pharmacy and they will forward the refill request to us. Please allow 3 business days for your refill to be completed.          Additional Information About Your Visit        Care EveryWhere ID     This is your Care EveryWhere ID. This could be used by other organizations to access your [facility] medical records  [id number]         Blood Pressure from Last 3 Encounters:   07/10/18 129/74   06/28/18 139/77   06/14/18 108/65    Weight from Last 3 Encounters:   07/10/18 192 lb (87.1 kg)   06/28/18 192 lb 11.2 oz (87.4 kg)   06/14/18 188 lb 9.6 oz (85.5 kg)              Today, you had the following     No orders found for display       Primary Care Provider Office Phone # Fax #    Jaleesa VERONIQUE [place] [phone] [address] [facility] [address]        Equal Access to Services     FANG HAY : Hadii aad ku hadasho Soomaali, waaxda luqadaha, qaybta kaalmada adeegyada, aaron dumont haytello echeverria . So [facility] [phone].    ATENCIÓN: Si habla español, tiene a gil disposición servicios gratuitos de asistencia lingüística. Llame al [phone].    We comply with applicable federal civil rights laws and [place] laws. We do not discriminate on the basis of race, color, national origin, age, disability, sex, sexual orientation, or gender identity.            Thank you!     Thank you for choosing [facility]  for your care. Our  goal is always to provide you with excellent care. Hearing back from our patients is one way we can continue to improve our services. Please take a few minutes to complete the written survey that you may receive in the mail after your visit with us. Thank you!             Your Updated Medication List - Protect others around you: Learn how to safely use, store and throw away your medicines at www.disposemymeds.org.          This list is accurate as of 7/11/18  4:29 PM.  Always use your most recent med list.                   Brand Name Dispense Instructions for use Diagnosis    ABILIFY 30 MG tablet   Generic drug:  ARIPiprazole      Take 30 mg by mouth daily        albuterol 108 (90 Base) MCG/ACT Inhaler    PROAIR HFA/PROVENTIL HFA/VENTOLIN HFA    1 Inhaler    Inhale 2 puffs into the lungs every 6 hours as needed for shortness of breath / dyspnea or wheezing    Mild intermittent asthma with acute exacerbation       amLODIPine 5 MG tablet    NORVASC    90 tablet    Take 1 tablet (5 mg) by mouth daily    Essential hypertension       blood glucose monitoring lancets     100 each    Use to test blood sugar 3 times daily    Type 2 diabetes mellitus with stage 3 chronic kidney disease, with long-term current use of insulin (H)       blood glucose monitoring test strip    no brand specified    100 each    1 strip by In Vitro route 3 times daily    Type 2 diabetes mellitus with stage 3 chronic kidney disease, with long-term current use of insulin (H)       Clozapine 200 MG tablet    CLOZARIL     Take 200 mg by mouth 2 times daily        dulaglutide 1.5 MG/0.5ML pen    TRULICITY    6 mL    Inject 1.5 mg Subcutaneous every 7 days    Type 2 diabetes mellitus with stage 3 chronic kidney disease, without long-term current use of insulin (H)       FLUoxetine 20 MG capsule    PROzac     Take 20 mg by mouth daily        insulin glargine 100 UNIT/ML injection    LANTUS SOLOSTAR    15 mL    Inject 22 units daily    Type 2 diabetes  mellitus with stage 3 chronic kidney disease, with long-term current use of insulin (H)       lisinopril 5 MG tablet    PRINIVIL/ZESTRIL    90 tablet    Take 1 tablet (5 mg) by mouth daily    Essential hypertension       montelukast 10 MG tablet    SINGULAIR    90 tablet    Take 1 tablet (10 mg) by mouth At Bedtime    Mild intermittent asthma with acute exacerbation       omeprazole 20 MG CR capsule    priLOSEC    60 capsule    Take 1 capsule (20 mg) by mouth daily 1 capsule bid    Gastroesophageal reflux disease without esophagitis       ranitidine 300 MG tablet    ZANTAC    90 tablet    Take 1 tablet (300 mg) by mouth At Bedtime    Gastroesophageal reflux disease without esophagitis       ULTILET SHARPS CONTAINER 1QT Misc     1 each    1 Device as needed    Type 2 diabetes mellitus with stage 3 chronic kidney disease, without long-term current use of insulin (H)

## 2018-07-11 NOTE — PROGRESS NOTES
July 11, 2018    [facility]  [id number] [address] [facility]  [place] [address]  [phone]  Health Coaching Progress Note    Patient Name: Divina Delgadillo Date: July 11, 2018      Session Length: 25      DATA    PRM Master Survey Scores Reviewed: Yes    Core Healthy Days Survey:         EMY Score (Last Two) 5/10/2018   EMY Raw Score 35   Activation Score 72.1   EMY Level 3       PHQ-2 Score 6/14/2018 5/10/2018   PHQ-2 Total Score Interpretation - Positive if 3 or more points; Administer PHQ-9 if positive 0 1       PHQ-9 SCORE 12/7/2017 5/23/2018   Total Score 12 2       Treatment Objective(s) Addressed in This Session:  Target Behavior(s): disease management/lifestyle changes increasing exercise    Current Stressors / Issues:  Divina shares that she has been dealing with stress lately and has just been busy as a result.    What Patient Does Well:   Divina is still working on her goals and feels things are going well around her healthy diet changes.  Previous Successes:   Divina is currently doing a CGM trial.  Areas in Need of Improvement:   Divina says the area in need of improvement at this time is exercise as she has only been doing exercise or being active a few times each week. Divina says she continues to go to the gym about 1x/week but plans to work to increase this to 3x/week. Writer agrees with Divina's plan.  Barriers to Change:   Divina notes personal stress has been a barrier recently but that it is situational and will likely pass soon.  Reasons for Change:   Divina wants to make changes for her health and self-esteem.  Plan/Goal for the Next 4 Weeks:     GOAL #1: Continue doing something active or exercising most days-this is only 1x/week currently-plans to target goal of 3x/week now  GOAL #1 Progress Toward Goal: 25%  GOAL #2: Continue increasing healthy eating habits-increase days eating breakfast, increase vegetables, decrease portions, monitor  carbs  GOAL #2 Progress Toward Goal: 50%    Intervention:  Motivational Interviewing    MI Intervention: Expressed Empathy/Understanding, Supported Autonomy, Collaboration, Evocation, Permission to raise concern or advise, Open-ended questions, Reflections: simple and complex, Change talk (evoked) and Reframe     Change Talk Expressed by the Patient: Desire to change Ability to change Reasons to change Committment to change Activation Taking steps    Provider Response to Change Talk: E - Evoked more info from patient about behavior change, A - Affirmed patient's thoughts, decisions, or attempts at behavior change, R - Reflected patient's change talk and S - Summarized patient's change talk statements    Assessment / Progress on Treatment Objective(s) / Homework:    Minimal progress - ACTION (Actively working towards change); Intervened by reinforcing change plan / affirming steps taken         Plan: (Homework, other):  Patient was encouraged to continue to seek condition-related information and education, as well as schedule a follow up appointment with the Health  in 4 weeks. Patient has set self-identified goals and will monitor progress until the next appointment.  Next visit scheduled for August 8 at 3PM.      Mahin Penny

## 2018-07-12 RX ORDER — FLASH GLUCOSE SENSOR
KIT MISCELLANEOUS
Qty: 3 EACH | Refills: 11 | Status: SHIPPED | OUTPATIENT
Start: 2018-07-12 | End: 2019-02-22

## 2018-07-12 RX ORDER — FLASH GLUCOSE SENSOR
1 KIT MISCELLANEOUS 3 TIMES DAILY PRN
Qty: 1 DEVICE | Refills: 3 | Status: SHIPPED | OUTPATIENT
Start: 2018-07-12 | End: 2019-02-22

## 2018-07-12 NOTE — TELEPHONE ENCOUNTER
Sent to [facility] per patient request to check coverage.     Dolly Riley RD, LD, CDE  Diabetes Education

## 2018-07-17 ENCOUNTER — TELEPHONE (OUTPATIENT)
Dept: FAMILY MEDICINE | Facility: CLINIC | Age: 33
End: 2018-07-17

## 2018-07-18 NOTE — TELEPHONE ENCOUNTER
[facility] - diabetic shoe order form completed and routed to Abraham for review and signature and then to Dr. Rodriguez as form requires MD/DO signature.

## 2018-07-25 ENCOUNTER — ALLIED HEALTH/NURSE VISIT (OUTPATIENT)
Dept: EDUCATION SERVICES | Facility: CLINIC | Age: 33
End: 2018-07-25
Payer: MEDICARE

## 2018-07-25 DIAGNOSIS — Z79.4 TYPE 2 DIABETES MELLITUS WITH STAGE 3 CHRONIC KIDNEY DISEASE, WITH LONG-TERM CURRENT USE OF INSULIN (H): Primary | ICD-10-CM

## 2018-07-25 DIAGNOSIS — N18.30 TYPE 2 DIABETES MELLITUS WITH STAGE 3 CHRONIC KIDNEY DISEASE, WITH LONG-TERM CURRENT USE OF INSULIN (H): Primary | ICD-10-CM

## 2018-07-25 DIAGNOSIS — Z79.899 MEDICATION MANAGEMENT: ICD-10-CM

## 2018-07-25 DIAGNOSIS — F25.0 SCHIZOAFFECTIVE DISORDER, BIPOLAR TYPE (H): ICD-10-CM

## 2018-07-25 DIAGNOSIS — N18.30 CKD (CHRONIC KIDNEY DISEASE) STAGE 3, GFR 30-59 ML/MIN (H): ICD-10-CM

## 2018-07-25 DIAGNOSIS — E11.22 TYPE 2 DIABETES MELLITUS WITH STAGE 3 CHRONIC KIDNEY DISEASE, WITH LONG-TERM CURRENT USE OF INSULIN (H): Primary | ICD-10-CM

## 2018-07-25 LAB
ANION GAP SERPL CALCULATED.3IONS-SCNC: 6 MMOL/L (ref 3–14)
BUN SERPL-MCNC: 36 MG/DL (ref 7–30)
CALCIUM SERPL-MCNC: 8.8 MG/DL (ref 8.5–10.1)
CHLORIDE SERPL-SCNC: 109 MMOL/L (ref 94–109)
CO2 SERPL-SCNC: 24 MMOL/L (ref 20–32)
CREAT SERPL-MCNC: 1.65 MG/DL (ref 0.52–1.04)
GFR SERPL CREATININE-BSD FRML MDRD: 36 ML/MIN/1.7M2
GLUCOSE SERPL-MCNC: 87 MG/DL (ref 70–99)
POTASSIUM SERPL-SCNC: 4.9 MMOL/L (ref 3.4–5.3)
SODIUM SERPL-SCNC: 139 MMOL/L (ref 133–144)

## 2018-07-25 PROCEDURE — 80048 BASIC METABOLIC PNL TOTAL CA: CPT | Performed by: CLINICAL NURSE SPECIALIST

## 2018-07-25 PROCEDURE — 36415 COLL VENOUS BLD VENIPUNCTURE: CPT | Performed by: CLINICAL NURSE SPECIALIST

## 2018-07-25 PROCEDURE — 95251 CONT GLUC MNTR ANALYSIS I&R: CPT

## 2018-07-25 NOTE — PROGRESS NOTES
LibrePro Continuous Glucose Monitor Interpretation     Patient History:   1. Type of Diabetes: Type 2 diabetes  2. Duration of diabetes or year of diagnosis: > 10 years  3. Current treatment regimen (include all diabetes medications, dose & dosing frequency/timing):   yes:     Diabetes Medication(s)     Insulin Sig    insulin glargine (LANTUS SOLOSTAR) 100 UNIT/ML pen Inject 22 units daily    Incretin Mimetic Agents (GLP-1 Receptor Agonists) Sig    dulaglutide (TRULICITY) 1.5 MG/0.5ML pen Inject 1.5 mg Subcutaneous every 7 days        *Abbreviated insulin dose documentation key: Insulin Trade Name (jgkufljhq-wwhwd-atnemz-bedtime) - i.e. Humalog 5-5-5-0 (Humalog 5 units at breakfast, 5 units at lunch, and 5 units at dinner).    4. Most Recent A1c Result:    Lab Results   Component Value Date    A1C 7.2 2018     5. Indication/s for LibrePro study: Unexplained fluctuations in glucose values.    Statistics:   1. Sensor worn for 10 days.  2. Glucose excursions:   Percent in target is: 76%  Percent above target is: 21%  Percent below target is: 3%  3. Estimated average glucose: 144 mg/dL                        Data evaluation:   1. Sensor modal day evaluation shows the followin. Consistent day-to-day patterns noted: pattern of daytime hyperglycemia.  2. Low glucose events: **                  Patient's Logbook shows the following:   Carbohydrate counting is: doesn't count carbohydrates, but wrote down meals and some portion sizes  Medication and/or insulin dosing is: accurate     Assessment and Plan:  Meal Plan Recommendation: use portion control and decrease liquid carbohydrates (regular soda, OJ, regular powerade etc.)   Check blood sugars 3 times daily - start checking a few more pre dinner blood sugar checks to capture any hypoglycemia  Recommend decrease to insulin - from 0-0-0-22 to 0-0-0-19 due to some hypoglycemia caught on the sensor.  Patient also reports feeling like she is eating extra calories due  to being shaky in the 70s-90s.      Reviewing the food records with the sensor graphs was beneficial for patient to see the result of the different foods.  Correlated higher spikes with liquid sugars and extra carbohydrates.    The highest elevations were from a b-day party , waffes/syrup and meals with OJ.    Dolly Riley RD, LD, CDE  Diabetes Education    Time Spent: 45 minutes  Any diabetes medication dose changes were made via the CDE Protocol and Collaborative Practice Agreement with the patient's primary care provider.

## 2018-07-25 NOTE — PATIENT INSTRUCTIONS
Try lower sugar beverages such as G2 or Propel instead of powerade   Double check the nutrition label on the powerade to see what the total carbohydrate is on it    Try to limit liquid carbohydrates such as juice or try the 50% less sugar or water down juice with water.     Keep focusing on plain water & your new water bottle    Keep up with the walks & biking     [facility] chocolate milk - lower sugar & higher protein.   Could try freezing into a milk ice (freezes hard, but is a good treat that takes awhile to eat)     For oatmeal do 1 packet + protein (peanutbutter) instead of 2 packets     Decrease Lantus insulin  to 19 units        Lab Results   Component Value Date    A1C 7.2 06/21/2018    A1C 7.3 04/11/2018    A1C 10.0 01/19/2018    A1C 9.6 12/07/2017    A1C 7.7 09/07/2017         Dolly Riley RD, LD, CDE  For Diabetes Education appointments call: [phone]   For Diabetes Education Related Questions call: [phone] or email: [email or web address]

## 2018-07-25 NOTE — Clinical Note
Divina Mcfadden did great the the professional continuous glucose monitor. She was really able to see the results of higher sugar/carb meals when we went through each daily graph.  Overall doing well!   Thanks! Katiana

## 2018-07-25 NOTE — TELEPHONE ENCOUNTER
"Called and spoke with \"Lorin Knight\" Foster Mother for pt.  Auth to discuss on file.  Read back to pt Dr. Dias's Result Note with recommendations dated 7/10/18.    Lorin verbalized understanding and agreed with plan, she is with pt now and will have lab done today at that appt.  Informed a letter has been sent to pt's home with this information.    Advised to call if any further questions or concerns.    Dionne Fuller LPN        "

## 2018-07-25 NOTE — MR AVS SNAPSHOT
After Visit Summary   7/25/2018    Divina Delgadillo    MRN: [id number]           Patient Information     Date Of Birth          1985        Visit Information        Provider Department      7/25/2018 3:00 PM [place] [facility] [place]        Care Instructions    Try lower sugar beverages such as G2 or Propel instead of powerade   Double check the nutrition label on the powerade to see what the total carbohydrate is on it    Try to limit liquid carbohydrates such as juice or try the 50% less sugar or water down juice with water.     Keep focusing on plain water & your new water bottle    Keep up with the walks & biking     [facility] chocolate milk - lower sugar & higher protein.   Could try freezing into a milk ice (freezes hard, but is a good treat that takes awhile to eat)     For oatmeal do 1 packet + protein (peanutbutter) instead of 2 packets     Decrease Lantus insulin  to 19 units        Lab Results   Component Value Date    A1C 7.2 06/21/2018    A1C 7.3 04/11/2018    A1C 10.0 01/19/2018    A1C 9.6 12/07/2017    A1C 7.7 09/07/2017         Dolly Riley RD, LD, CDE  For Diabetes Education appointments call: [phone]   For Diabetes Education Related Questions call: [phone] or email: [email or web address]            Follow-ups after your visit        Your next 10 appointments already scheduled     Jul 25, 2018  4:00 PM CDT   LAB with [facility] ([facility])    [address] [facility] [address] [phone]           Please do not eat 10-12 hours before your appointment if you are coming in fasting for labs on lipids, cholesterol, or glucose (sugar). This does not apply to pregnant women. Water, hot tea and black coffee (with nothing added) are okay. Do not drink other fluids, diet soda or chew gum.            Aug 01, 2018  1:00 PM CDT   New Visit with Meghan Rosenthal MD   [facility]  ([facility])    [facility]  [address]  [facility] [address]   [phone]            Aug 27, 2018  3:00 PM CDT   Diabetic Education with [place] [facility]   [facility] [place] ([facility])    [address] [facility] [address] [facility] [address]   [phone]            Jan 14, 2019 10:00 AM CST   LAB with LAB FIRST FLOOR [facility] ([facility])    [address] [place] [id number] [phone]           Please do not eat 10-12 hours before your appointment if you are coming in fasting for labs on lipids, cholesterol, or glucose (sugar). This does not apply to pregnant women. Water, hot tea and black coffee (with nothing added) are okay. Do not drink other fluids, diet soda or chew gum.            Jan 14, 2019 10:30 AM CST   Return Visit with Sánchez Dias MD   [facility] ([facility])    [id number] [place] [id number] [phone]              Who to contact     If you have questions or need follow up information about today's clinic visit or your schedule please contact [facility] [facility] directly at [phone].  Normal or non-critical lab and imaging results will be communicated to you by MyChart, letter or phone within 4 business days after the clinic has received the results. If you do not hear from us within 7 days, please contact the clinic through MyChart or phone. If you have a critical or abnormal lab result, we will notify you by phone as soon as possible.  Submit refill requests through [employer] or call your pharmacy and they will forward the refill request to us. Please allow 3 business days for your refill to be completed.          Additional Information About Your Visit        Care EveryWhere ID     This is your Care EveryWhere ID. This could be used by other organizations to access your  [place] medical records  [id number]         Blood Pressure from Last 3 Encounters:   07/10/18 129/74   06/28/18 139/77   06/14/18 108/65    Weight from Last 3 Encounters:   07/10/18 87.1 kg (192 lb)   06/28/18 87.4 kg (192 lb 11.2 oz)   06/14/18 85.5 kg (188 lb 9.6 oz)              Today, you had the following     No orders found for display       Primary Care Provider Office Phone # Fax #    Jaleesa Velasquez, VERONIQUE [phone] [address] [facility] [address]        Equal Access to Services     FANG HAY : Hadii juan manuel Santiago, waemelinada lashae, qaybta kaalmada alejo, aaron munoz. So [facility] [phone].    ATENCIÓN: Si habla español, tiene a gil disposición servicios gratuitos de asistencia lingüística. Llame al [phone].    We comply with applicable federal civil rights laws and [place] laws. We do not discriminate on the basis of race, color, national origin, age, disability, sex, sexual orientation, or gender identity.            Thank you!     Thank you for choosing [place] [facility]  for your care. Our goal is always to provide you with excellent care. Hearing back from our patients is one way we can continue to improve our services. Please take a few minutes to complete the written survey that you may receive in the mail after your visit with us. Thank you!             Your Updated Medication List - Protect others around you: Learn how to safely use, store and throw away your medicines at www.disposemymeds.org.          This list is accurate as of 7/25/18  3:42 PM.  Always use your most recent med list.                   Brand Name Dispense Instructions for use Diagnosis    ABILIFY 30 MG tablet   Generic drug:  ARIPiprazole      Take 30 mg by mouth daily        albuterol 108 (90 Base) MCG/ACT Inhaler    PROAIR HFA/PROVENTIL HFA/VENTOLIN HFA    1 Inhaler    Inhale 2 puffs into the lungs every 6 hours as needed  for shortness of breath / dyspnea or wheezing    Mild intermittent asthma with acute exacerbation       amLODIPine 5 MG tablet    NORVASC    90 tablet    Take 1 tablet (5 mg) by mouth daily    Essential hypertension       blood glucose monitoring lancets     100 each    Use to test blood sugar 3 times daily    Type 2 diabetes mellitus with stage 3 chronic kidney disease, with long-term current use of insulin (H)       blood glucose monitoring test strip    no brand specified    100 each    1 strip by In Vitro route 3 times daily    Type 2 diabetes mellitus with stage 3 chronic kidney disease, with long-term current use of insulin (H)       Clozapine 200 MG tablet    CLOZARIL     Take 200 mg by mouth 2 times daily        continuous blood glucose monitoring sensor     3 each    For use with Freestyle Yash Flash  for continuous monitioring of blood glucose levels. Replace sensor every 10 days.    Type 2 diabetes mellitus with stage 3 chronic kidney disease, with long-term current use of insulin (H)       dulaglutide 1.5 MG/0.5ML pen    TRULICITY    6 mL    Inject 1.5 mg Subcutaneous every 7 days    Type 2 diabetes mellitus with stage 3 chronic kidney disease, without long-term current use of insulin (H)       FLUoxetine 20 MG capsule    PROzac     Take 20 mg by mouth daily        FREESTYLE YASH READER Nakia     1 Device    1 Device 3 times daily as needed    Type 2 diabetes mellitus with stage 3 chronic kidney disease, with long-term current use of insulin (H)       insulin glargine 100 UNIT/ML injection    LANTUS SOLOSTAR    15 mL    Inject 22 units daily    Type 2 diabetes mellitus with stage 3 chronic kidney disease, with long-term current use of insulin (H)       lisinopril 5 MG tablet    PRINIVIL/ZESTRIL    90 tablet    Take 1 tablet (5 mg) by mouth daily    Essential hypertension       montelukast 10 MG tablet    SINGULAIR    90 tablet    Take 1 tablet (10 mg) by mouth At Bedtime    Mild intermittent  asthma with acute exacerbation       omeprazole 20 MG CR capsule    priLOSEC    60 capsule    Take 1 capsule (20 mg) by mouth daily 1 capsule bid    Gastroesophageal reflux disease without esophagitis       ranitidine 300 MG tablet    ZANTAC    90 tablet    Take 1 tablet (300 mg) by mouth At Bedtime    Gastroesophageal reflux disease without esophagitis       ULTILET SHARPS CONTAINER 1QT Misc     1 each    1 Device as needed    Type 2 diabetes mellitus with stage 3 chronic kidney disease, without long-term current use of insulin (H)

## 2018-07-26 ENCOUNTER — MEDICAL CORRESPONDENCE (OUTPATIENT)
Dept: HEALTH INFORMATION MANAGEMENT | Facility: CLINIC | Age: 33
End: 2018-07-26

## 2018-07-26 NOTE — TELEPHONE ENCOUNTER
Form signed, faxed and sent to scanning.    [facility] [place]

## 2018-07-26 NOTE — PROGRESS NOTES
7/10/18    CC: CKD Stage 3, HTN    HPI: Divina Delgadillo is a 33 year old female who presents for follow-up of CKD. To review, her hx is significant for diabetes (~ 20 years), bipolar disease - was on lithium therapy for around 6 years but since our initial visit, she has since been taken off of it. Her diabets was previously very poorly controlled (up to 10% in Jan) but is improved to 7.2% last month. Her creatinine has been 1.31-1.9 in the past year with one KIRSTIE to 2.35 in April. Creatinine is up slightly today. She has swelling in her legs at times; has been takng lisinopril 5 mg daily (1/2 of a 10 mg tab). Blodo pressure is good here but 140s at home when checked recently.        Allergies   Allergen Reactions     Latex          Current Outpatient Prescriptions on File Prior to Visit:  amLODIPine (NORVASC) 5 MG tablet Take 1 tablet (5 mg) by mouth daily   ARIPiprazole (ABILIFY) 30 MG tablet Take 30 mg by mouth daily   blood glucose monitoring (NO BRAND SPECIFIED) test strip 1 strip by In Vitro route 3 times daily   blood glucose monitoring (SOFTCLIX) lancets Use to test blood sugar 3 times daily   Clozapine (CLOZARIL) 200 MG tablet Take 200 mg by mouth 2 times daily   dulaglutide (TRULICITY) 1.5 MG/0.5ML pen Inject 1.5 mg Subcutaneous every 7 days   FLUoxetine (PROZAC) 20 MG capsule Take 20 mg by mouth daily   insulin glargine (LANTUS SOLOSTAR) 100 UNIT/ML pen Inject 22 units daily   montelukast (SINGULAIR) 10 MG tablet Take 1 tablet (10 mg) by mouth At Bedtime   omeprazole (PRILOSEC) 20 MG CR capsule Take 1 capsule (20 mg) by mouth daily 1 capsule bid   ranitidine (ZANTAC) 300 MG tablet Take 1 tablet (300 mg) by mouth At Bedtime   ULTILET SHARPS CONTAINER 1QT MISC 1 Device as needed   albuterol (PROAIR HFA/PROVENTIL HFA/VENTOLIN HFA) 108 (90 BASE) MCG/ACT Inhaler Inhale 2 puffs into the lungs every 6 hours as needed for shortness of breath / dyspnea or wheezing (Patient not taking: Reported on 7/10/2018)    Continuous Blood Gluc  (FREESTYLE BRIANNA READER) VANE 1 Device 3 times daily as needed   continuous blood glucose monitoring (FREESTYLE BRIANNA) sensor For use with Freestyle Brianna Flash  for continuous monitioring of blood glucose levels. Replace sensor every 10 days.     No current facility-administered medications on file prior to visit.     Past Medical History:   Diagnosis Date     Cervical high risk HPV (human papillomavirus) test positive 6/20/2017     Depressive disorder, not elsewhere classified      DVT (deep venous thrombosis) (H)      Dysmetabolic syndrome X      Esophageal reflux     GERD     Mild intermittent asthma      Other abnormal heart sounds     Heart murmur     Other specified types of schizophrenia, unspecified condition      Pancreatic cancer (H)     left adrenal gland, partial pancreas, and spleen removed; no chemo or radiation.      Type II or unspecified type diabetes mellitus without mention of complication, not stated as uncontrolled      Unspecified disorder of tympanic membrane        Past Surgical History:   Procedure Laterality Date     ADRENALECTOMY       PANCREATECTOMY PARTIAL       SPLENECTOMY       SURGICAL HISTORY OF -   10/1/2000    Tympanoplasty     SURGICAL HISTORY OF -   10/1/2000    Tonsillectomy       Social History   Substance Use Topics     Smoking status: Former Smoker     Packs/day: 1.00     Years: 4.00     Types: Cigarettes     Smokeless tobacco: Never Used      Comment: little less than a pack a day      Alcohol use No       Family History   Problem Relation Age of Onset     Respiratory Mother      ASTHMA     Allergies Mother      Depression Mother      HEART DISEASE Father      HEART VALVE REPLACEMENT     Hypertension Father      Diabetes Father      Depression Father      Respiratory Brother      Allergies Brother      Respiratory Sister      Allergies Sister      Depression Sister      Respiratory Sister      Allergies Sister      Depression Sister       KIDNEY DISEASE No family hx of        ROS: A 4 system review of systems was negative other than noted here or above.     Exam:  /74 (BP Location: Right arm, Patient Position: Sitting, Cuff Size: Adult Regular)  Pulse 95  Wt 87.1 kg (192 lb)  SpO2 99%  BMI 34.01 kg/m2    GENERAL APPEARANCE: alert and no distress  NECK: supple, no adenopathy  RESP: lungs clear to auscultation   CV: regular rhythm, normal rate, no rub, no edema.   Extremities: no clubbing, cyanosis  SKIN: no rash  NEURO: mentation intact and speech normal  PSYCH: affect normal/bright    Results:    Orders Only on 07/10/2018   Component Date Value Ref Range Status     WBC 07/10/2018 13.4* 4.0 - 11.0 10e9/L Final     RBC Count 07/10/2018 4.22  3.8 - 5.2 10e12/L Final     Hemoglobin 07/10/2018 12.9  11.7 - 15.7 g/dL Final     Hematocrit 07/10/2018 41.8  35.0 - 47.0 % Final     MCV 07/10/2018 99  78 - 100 fl Final     MCH 07/10/2018 30.6  26.5 - 33.0 pg Final     MCHC 07/10/2018 30.9* 31.5 - 36.5 g/dL Final     RDW 07/10/2018 14.0  10.0 - 15.0 % Final     Platelet Count 07/10/2018 379  150 - 450 10e9/L Final     Diff Method 07/10/2018 Automated Method   Final     % Neutrophils 07/10/2018 59.0  % Final     % Lymphocytes 07/10/2018 29.7  % Final     % Monocytes 07/10/2018 7.3  % Final     % Eosinophils 07/10/2018 1.9  % Final     % Basophils 07/10/2018 0.8  % Final     % Immature Granulocytes 07/10/2018 1.3  % Final     Absolute Neutrophil 07/10/2018 7.9  1.6 - 8.3 10e9/L Final     Absolute Lymphocytes 07/10/2018 4.0  0.8 - 5.3 10e9/L Final     Absolute Monocytes 07/10/2018 1.0  0.0 - 1.3 10e9/L Final     Absolute Eosinophils 07/10/2018 0.3  0.0 - 0.7 10e9/L Final     Absolute Basophils 07/10/2018 0.1  0.0 - 0.2 10e9/L Final     Abs Immature Granulocytes 07/10/2018 0.2  0 - 0.4 10e9/L Final     Parathyroid Hormone Intact 07/10/2018 54  18 - 80 pg/mL Final     Protein Random Urine 07/10/2018 0.27  g/L Final     Protein Total Urine g/gr  Creatinine 07/10/2018 0.21* 0 - 0.2 g/g Cr Final     Sodium 07/10/2018 141  133 - 144 mmol/L Final     Potassium 07/10/2018 4.6  3.4 - 5.3 mmol/L Final     Chloride 07/10/2018 111* 94 - 109 mmol/L Final     Carbon Dioxide 07/10/2018 24  20 - 32 mmol/L Final     Anion Gap 07/10/2018 6  3 - 14 mmol/L Final     Glucose 07/10/2018 155* 70 - 99 mg/dL Final    Non Fasting     Urea Nitrogen 07/10/2018 39* 7 - 30 mg/dL Final     Creatinine 07/10/2018 1.92* 0.52 - 1.04 mg/dL Final     GFR Estimate 07/10/2018 30* >60 mL/min/1.7m2 Final    Non  GFR Calc     GFR Estimate If Black 07/10/2018 37* >60 mL/min/1.7m2 Final    African American GFR Calc     Calcium 07/10/2018 9.1  8.5 - 10.1 mg/dL Final     Phosphorus 07/10/2018 3.0  2.5 - 4.5 mg/dL Final     Albumin 07/10/2018 3.7  3.4 - 5.0 g/dL Final     Creatinine Urine 07/10/2018 128  mg/dL Final      Assessment/Plan:   1. CKD Stage 3: risk factors for kidney disease include longstanding hypertension, diabetes, as well as longterm lithium use. She is now away from lithium which is great to see given she is already at risk for diabetic nephropathy and would like to minimize risk. I was holding off on starting lithium with hopes of seeing improvement in her function with time away from lithium.Will plan to recheck labs in the near future to assure this creatinine level is back to her recent baseline.     2. DM: much improved last month to 7.2%. Congratulated her on much improved level.     3. Bipolar: now off of lithium.     4. GERD: ideally would avoid PPI therapy given increased risk of incident CKD and AIN noted with PPI therapy. Tolerating once daily dosing - I am not certain we will be able to get away from PPI completely given the severity of her sxs previously.     5. Hypertension: Will trial norvasc at night and lisionpril in the AM. Educated to call if consistently above goal, christine, would also like her to bring her cuff in so it can be compared to clinic  cuff to assure accuracy.     6. Leukocytosis: has been present dating back to at least 2015 - encourage hematology appt.     Patient Instructions   1 Recommend hematology referral  2 New script for the 5 mg lisinopril tabs sent to pharmacy  3 Trial moving the amlodipine to being taken at night.   4 six month follow-up.      Sánchez Dias DO

## 2018-07-30 DIAGNOSIS — J30.2 CHRONIC SEASONAL ALLERGIC RHINITIS, UNSPECIFIED TRIGGER: ICD-10-CM

## 2018-07-30 NOTE — TELEPHONE ENCOUNTER
"Requested Prescriptions   Pending Prescriptions Disp Refills     loratadine (CLARITIN) 10 MG tablet  Last Written Prescription Date:  08/02/17   Ended 09/07/17  Last Fill Quantity: 90,  # refills: 1   Last office visit: 6/14/2018 with prescribing provider:  06/14/18   Future Office Visit:     90 tablet 1     Sig: Take 1 tablet (10 mg) by mouth every morning    Antihistamines Protocol Passed    7/30/2018  2:51 PM       Passed - Patient is 3-64 years of age    Apply weight-based dosing for peds patients age 3 - 12 years of age.    Forward request to provider for patients under the age of 3 or over the age of 64.         Passed - Recent (12 mo) or future (30 days) visit within the authorizing provider's specialty    Patient had office visit in the last 12 months or has a visit in the next 30 days with authorizing provider or within the authorizing provider's specialty.  See \"Patient Info\" tab in inbasket, or \"Choose Columns\" in Meds & Orders section of the refill encounter.              "

## 2018-07-31 ENCOUNTER — TRANSFERRED RECORDS (OUTPATIENT)
Dept: HEALTH INFORMATION MANAGEMENT | Facility: CLINIC | Age: 33
End: 2018-07-31

## 2018-07-31 NOTE — TELEPHONE ENCOUNTER
Routing refill request to provider for review/approval because:  Drug not active on patient's medication list    Ritika Davidson RN

## 2018-08-01 ENCOUNTER — HOSPITAL ENCOUNTER (OUTPATIENT)
Dept: LAB | Facility: CLINIC | Age: 33
Discharge: HOME | End: 2018-08-01
Attending: INTERNAL MEDICINE | Admitting: INTERNAL MEDICINE
Payer: MEDICARE

## 2018-08-01 ENCOUNTER — ONCOLOGY VISIT (OUTPATIENT)
Dept: ONCOLOGY | Facility: CLINIC | Age: 33
End: 2018-08-01
Attending: INTERNAL MEDICINE
Payer: MEDICARE

## 2018-08-01 VITALS
OXYGEN SATURATION: 98 % | RESPIRATION RATE: 16 BRPM | HEART RATE: 95 BPM | WEIGHT: 195.6 LBS | TEMPERATURE: 98.5 F | DIASTOLIC BLOOD PRESSURE: 45 MMHG | BODY MASS INDEX: 33.39 KG/M2 | HEIGHT: 64 IN | SYSTOLIC BLOOD PRESSURE: 137 MMHG

## 2018-08-01 DIAGNOSIS — Z79.4 TYPE 2 DIABETES MELLITUS WITH STAGE 3 CHRONIC KIDNEY DISEASE, WITH LONG-TERM CURRENT USE OF INSULIN (H): ICD-10-CM

## 2018-08-01 DIAGNOSIS — N18.30 CKD (CHRONIC KIDNEY DISEASE) STAGE 3, GFR 30-59 ML/MIN (H): ICD-10-CM

## 2018-08-01 DIAGNOSIS — I10 ESSENTIAL HYPERTENSION: ICD-10-CM

## 2018-08-01 DIAGNOSIS — E78.5 HYPERLIPIDEMIA LDL GOAL <100: ICD-10-CM

## 2018-08-01 DIAGNOSIS — F31.9 BIPOLAR AFFECTIVE DISORDER, REMISSION STATUS UNSPECIFIED (H): ICD-10-CM

## 2018-08-01 DIAGNOSIS — N18.30 TYPE 2 DIABETES MELLITUS WITH STAGE 3 CHRONIC KIDNEY DISEASE, WITH LONG-TERM CURRENT USE OF INSULIN (H): ICD-10-CM

## 2018-08-01 DIAGNOSIS — D72.829 LEUKOCYTOSIS, UNSPECIFIED TYPE: Primary | ICD-10-CM

## 2018-08-01 DIAGNOSIS — C25.9 MALIGNANT NEOPLASM OF PANCREAS, UNSPECIFIED LOCATION OF MALIGNANCY (H): ICD-10-CM

## 2018-08-01 DIAGNOSIS — K21.9 GASTROESOPHAGEAL REFLUX DISEASE WITHOUT ESOPHAGITIS: ICD-10-CM

## 2018-08-01 DIAGNOSIS — J45.21 MILD INTERMITTENT ASTHMA WITH ACUTE EXACERBATION: ICD-10-CM

## 2018-08-01 DIAGNOSIS — E11.22 TYPE 2 DIABETES MELLITUS WITH STAGE 3 CHRONIC KIDNEY DISEASE, WITH LONG-TERM CURRENT USE OF INSULIN (H): ICD-10-CM

## 2018-08-01 LAB
CRP SERPL-MCNC: 5.4 MG/L (ref 0–8)
DIFFERENTIAL METHOD BLD: ABNORMAL
ERYTHROCYTE [DISTWIDTH] IN BLOOD BY AUTOMATED COUNT: 13.6 % (ref 10–15)
ERYTHROCYTE [SEDIMENTATION RATE] IN BLOOD BY WESTERGREN METHOD: 24 MM/H (ref 0–20)
HCT VFR BLD AUTO: 37.7 % (ref 35–47)
HGB BLD-MCNC: 12 G/DL (ref 11.7–15.7)
MCH RBC QN AUTO: 31.2 PG (ref 26.5–33)
MCHC RBC AUTO-ENTMCNC: 31.8 G/DL (ref 31.5–36.5)
MCV RBC AUTO: 98 FL (ref 78–100)
PLATELET # BLD AUTO: 322 10E9/L (ref 150–450)
RBC # BLD AUTO: 3.85 10E12/L (ref 3.8–5.2)
RETICS # AUTO: 75.5 10E9/L (ref 25–95)
RETICS/RBC NFR AUTO: 2 % (ref 0.5–2)
WBC # BLD AUTO: 19 10E9/L (ref 4–11)

## 2018-08-01 PROCEDURE — 40000847 ZZHCL STATISTIC MORPHOLOGY W/INTERP HISTOLOGY TC 85060: Performed by: INTERNAL MEDICINE

## 2018-08-01 PROCEDURE — 81403 MOPATH PROCEDURE LEVEL 4: CPT | Mod: 91 | Performed by: INTERNAL MEDICINE

## 2018-08-01 PROCEDURE — 85045 AUTOMATED RETICULOCYTE COUNT: CPT | Performed by: INTERNAL MEDICINE

## 2018-08-01 PROCEDURE — 36415 COLL VENOUS BLD VENIPUNCTURE: CPT | Performed by: INTERNAL MEDICINE

## 2018-08-01 PROCEDURE — 99204 OFFICE O/P NEW MOD 45 MIN: CPT | Performed by: INTERNAL MEDICINE

## 2018-08-01 PROCEDURE — 81219 CALR GENE COM VARIANTS: CPT | Performed by: INTERNAL MEDICINE

## 2018-08-01 PROCEDURE — 81403 MOPATH PROCEDURE LEVEL 4: CPT | Performed by: INTERNAL MEDICINE

## 2018-08-01 PROCEDURE — G0463 HOSPITAL OUTPT CLINIC VISIT: HCPCS

## 2018-08-01 PROCEDURE — 85025 COMPLETE CBC W/AUTO DIFF WBC: CPT | Performed by: INTERNAL MEDICINE

## 2018-08-01 PROCEDURE — 85652 RBC SED RATE AUTOMATED: CPT | Performed by: INTERNAL MEDICINE

## 2018-08-01 PROCEDURE — 85060 BLOOD SMEAR INTERPRETATION: CPT | Performed by: INTERNAL MEDICINE

## 2018-08-01 PROCEDURE — 86140 C-REACTIVE PROTEIN: CPT | Performed by: INTERNAL MEDICINE

## 2018-08-01 RX ORDER — LORATADINE 10 MG/1
10 TABLET ORAL EVERY MORNING
Qty: 90 TABLET | Refills: 1 | Status: SHIPPED | OUTPATIENT
Start: 2018-08-01 | End: 2019-02-14

## 2018-08-01 ASSESSMENT — PAIN SCALES - GENERAL: PAINLEVEL: NO PAIN (0)

## 2018-08-01 NOTE — LETTER
8/1/2018         RE: Divina Delgadillo  [address] [address] [place] [id number]        Dear Colleague,    Thank you for referring your patient, Divina Delgadillo, to the [facility]. Please see a copy of my visit note below.    BCRable test not covered by patient's insurance.  Unable to order. Dr. Rosenthal aware.    DATE OF VISIT: Aug 1, 2018    REASON FOR REFERRAL: Management of leukocytosis.    CHIEF COMPLAINT:   Chief Complaint   Patient presents with     Hematology     New Patient - Leukocytosis        HISTORY OF PRESENT ILLNESS:   This is a 33-year-old female patient with chronic history of leukocytosis. She has had intermittent leukocytosis without anemia. It has become persistent since February of 2015. 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 In addition to the spleen and tail of the pancreas, she also had her left adrenal gland removed. Subsequently, her white count has been higher than the normal range. Peripheral blood smear done was consistent with a reactive process. She does have significant abdominal obesity due to type 2 diabetes.  She was on lithium for personality disorder. She also has stage 3 chronic renal disease.  Most recent laboratory test revealed elevated white count at 19,000.  Hemoglobin is 12.  Sedimentation rate is 24.    REVIEW OF SYSTEMS:   Constitutional: Negative for fever, chills, and night sweats.  Skin: negative.  Eyes: negative.  Ears/Nose/Throat: negative.  Respiratory: No shortness of breath, dyspnea on exertion, cough, or hemoptysis.  Cardiovascular: negative.  Gastrointestinal: negative.  Genitourinary: negative.  Musculoskeletal: negative.  Neurologic: negative.  Psychiatric: negative.  Hematologic/Lymphatic/Immunologic: negative.  Endocrine: negative.    PAST MEDICAL HISTORY:   Past Medical History:   Diagnosis Date     Cervical high risk HPV (human papillomavirus) test  positive 6/20/2017     Depressive disorder, not elsewhere classified      DVT (deep venous thrombosis) (H)      Dysmetabolic syndrome X      Esophageal reflux     GERD     Mild intermittent asthma      Other abnormal heart sounds     Heart murmur     Other specified types of schizophrenia, unspecified condition      Pancreatic cancer (H)     left adrenal gland, partial pancreas, and spleen removed; no chemo or radiation.      Type II or unspecified type diabetes mellitus without mention of complication, not stated as uncontrolled      Unspecified disorder of tympanic membrane        PAST SURGICAL HISTORY:   Past Surgical History:   Procedure Laterality Date     ADRENALECTOMY       PANCREATECTOMY PARTIAL       SPLENECTOMY       SURGICAL HISTORY OF -   10/1/2000    Tympanoplasty     SURGICAL HISTORY OF -   10/1/2000    Tonsillectomy       ALLERGIES:   Allergies as of 08/01/2018 - Jose as Reviewed 08/01/2018   Allergen Reaction Noted     Acetaminophen Other (See Comments) 11/06/2007     Latex  03/28/2005     Latex Rash 11/06/2007       MEDICATIONS:   Current Outpatient Prescriptions   Medication Sig Dispense Refill     albuterol (PROAIR HFA/PROVENTIL HFA/VENTOLIN HFA) 108 (90 BASE) MCG/ACT Inhaler Inhale 2 puffs into the lungs every 6 hours as needed for shortness of breath / dyspnea or wheezing 1 Inhaler 3     amLODIPine (NORVASC) 5 MG tablet Take 1 tablet (5 mg) by mouth daily 90 tablet 1     ARIPiprazole (ABILIFY) 30 MG tablet Take 30 mg by mouth daily       blood glucose monitoring (NO BRAND SPECIFIED) test strip 1 strip by In Vitro route 3 times daily 100 each 11     blood glucose monitoring (SOFTCLIX) lancets Use to test blood sugar 3 times daily 100 each 11     Clozapine (CLOZARIL) 200 MG tablet Take 200 mg by mouth 2 times daily       Continuous Blood Gluc  (FREESTYLE BRIANNA READER) VANE 1 Device 3 times daily as needed 1 Device 3     continuous blood glucose monitoring (FREESTYLE BRIANNA) sensor For use  with Freestyle Yash Flash  for continuous monitioring of blood glucose levels. Replace sensor every 10 days. 3 each 11     dulaglutide (TRULICITY) 1.5 MG/0.5ML pen Inject 1.5 mg Subcutaneous every 7 days 6 mL 3     FLUoxetine (PROZAC) 20 MG capsule Take 20 mg by mouth daily       insulin glargine (LANTUS SOLOSTAR) 100 UNIT/ML pen Inject 19 units daily 15 mL 2     lisinopril (PRINIVIL/ZESTRIL) 5 MG tablet Take 1 tablet (5 mg) by mouth daily 90 tablet 3     loratadine (CLARITIN) 10 MG tablet Take 1 tablet (10 mg) by mouth every morning 90 tablet 1     montelukast (SINGULAIR) 10 MG tablet Take 1 tablet (10 mg) by mouth At Bedtime 90 tablet 1     omeprazole (PRILOSEC) 20 MG CR capsule Take 1 capsule (20 mg) by mouth daily 1 capsule bid 60 capsule 5     ranitidine (ZANTAC) 300 MG tablet Take 1 tablet (300 mg) by mouth At Bedtime 90 tablet 1     ULTILET SHARPS CONTAINER 1QT MISC 1 Device as needed 1 each 11        FAMILY HISTORY:   Family History   Problem Relation Age of Onset     Respiratory Mother      ASTHMA     Allergies Mother      Depression Mother      HEART DISEASE Father      HEART VALVE REPLACEMENT     Hypertension Father      Diabetes Father      Depression Father      Respiratory Brother      Allergies Brother      Respiratory Sister      Allergies Sister      Depression Sister      Respiratory Sister      Allergies Sister      Depression Sister      KIDNEY DISEASE No family hx of         SOCIAL HISTORY:   Social History     Social History     Marital status: Single     Spouse name: N/A     Number of children: 0     Years of education: N/A     Social History Main Topics     Smoking status: Former Smoker     Packs/day: 1.00     Years: 4.00     Types: Cigarettes     Smokeless tobacco: Never Used      Comment: 15 cig/day     Alcohol use No     Drug use: No     Sexual activity: Yes     Partners: Female      Comment: Both male and female      Other Topics Concern     Parent/Sibling W/ Cabg, Mi Or  "Angioplasty Before 65f 55m? No     Social History Narrative       PHYSICAL EXAMINATION:   /45 (BP Location: Right arm, Patient Position: Sitting, Cuff Size: Adult Regular)  Pulse 95  Temp 98.5  F (36.9  C) (Tympanic)  Resp 16  Ht 1.626 m (5' 4\")  Wt 88.7 kg (195 lb 9.6 oz)  SpO2 98%  Breastfeeding? No  BMI 33.57 kg/m2  Wt Readings from Last 10 Encounters:   08/01/18 88.7 kg (195 lb 9.6 oz)   07/10/18 87.1 kg (192 lb)   06/28/18 87.4 kg (192 lb 11.2 oz)   06/14/18 85.5 kg (188 lb 9.6 oz)   05/23/18 86.5 kg (190 lb 9.6 oz)   05/23/18 85.5 kg (188 lb 8 oz)   05/01/18 87.5 kg (193 lb)   04/11/18 85.8 kg (189 lb 1.6 oz)   02/21/18 89.8 kg (198 lb)   01/19/18 86.6 kg (191 lb)      ECOG performance status: 0  GENERAL APPEARANCE: Healthy, alert and in no acute distress.  HEENT: Sclerae anicteric. PERRLA. Oropharynx without ulcers, lesions, or thrush.  NECK: Supple. No asymmetry or masses.  LYMPHATICS: No palpable cervical, supraclavicular, axillary, or inguinal lymphadenopathy.  RESP: Lungs clear to auscultation bilaterally without rales, rhonchi or wheezes.  CARDIOVASCULAR: Regular rate and rhythm. Normal S1, S2; no S3 or S4. No murmur, gallop, or rub.  ABDOMEN: Soft, nontender. Bowel sounds normal. No palpable organomegaly or masses.  MUSCULOSKELETAL: Extremities without gross deformities noted. No edema of bilateral lower extremities.  SKIN: No suspicious lesions or rashes.  NEURO: Alert and oriented x 3. Cranial nerves II-XII grossly intact.  PSYCHIATRIC: Mentation and affect appear normal.    LABORATORY RESULTS:  Orders Only on 07/25/2018   Component Date Value Ref Range Status     Sodium 07/25/2018 139  133 - 144 mmol/L Final     Potassium 07/25/2018 4.9  3.4 - 5.3 mmol/L Final     Chloride 07/25/2018 109  94 - 109 mmol/L Final     Carbon Dioxide 07/25/2018 24  20 - 32 mmol/L Final     Anion Gap 07/25/2018 6  3 - 14 mmol/L Final     Glucose 07/25/2018 87  70 - 99 mg/dL Final     Urea Nitrogen 07/25/2018 " 36* 7 - 30 mg/dL Final     Creatinine 07/25/2018 1.65* 0.52 - 1.04 mg/dL Final     GFR Estimate 07/25/2018 36* >60 mL/min/1.7m2 Final    Non  GFR Calc     GFR Estimate If Black 07/25/2018 44* >60 mL/min/1.7m2 Final    African American GFR Calc     Calcium 07/25/2018 8.8  8.5 - 10.1 mg/dL Final     .    ASSESSMENT AND PLAN:    (D72.829) Leukocytosis, unspecified type  (primary encounter diagnosis)  This is a 33-year-old female patient with persistent leukocytosis probably related to post splenectomy as well as reactive.  Today I will check Erythrocyte sedimentation rate auto, CRP inflammation, Blood Morphology Pathologist Review, CBC with platelets differential, Reticulocyte Count, Next Generation Sequencing   Oncology: Myeloproliferative Neoplasm Panel.  I will see the patient when these results are available.    (C25.9) Malignant neoplasm of pancreas, unspecified location of malignancy (H)  There is no clinical evidence of disease recurrence    (K21.9) Gastroesophageal reflux disease without esophagitis  Patient currently on omeprazole and ranitidine    (I10) Essential hypertension  Patient currently on lisinopril and Norvasc    (N18.3) CKD (chronic kidney disease) stage 3, GFR 30-59 ml/min  Creatinine has been stable    (E11.22,  N18.3,  Z79.4) Type 2 diabetes mellitus with stage 3 chronic kidney disease, with long-term current use of insulin (H)    (J45.21) Mild intermittent asthma with acute exacerbation  Patient currently on albuterol inhaler.    The patient is ready to learn, no apparent learning barriers were identified, Diagnosis and treatment plans were explained to the patient. The patient expressed understanding of the content. The patient questions were answered to her satisfaction.    Meghan Rosenthal MD    Chart documentation with Dragon Voice recognition Software. Although reviewed after completion, some words and grammatical errors may remain.    Again, thank you for allowing me to  participate in the care of your patient.        Sincerely,        Meghan Rosenthal MD

## 2018-08-01 NOTE — PATIENT INSTRUCTIONS
We would like you to have labs drawn today. Dr. Rosenthal would like to see you back in in clinic when your labs results are in. Thank you.      When you are in need of a refill, please call your pharmacy and they will send us a request.      Copy of appointments, and after visit summary (AVS) given to patient.      If you have any questions during business hours (M-F 8 AM- 4PM), please call Radha Owens RN Oncology Hematology  at [facility] [phone].       For questions after business hours, or on holidays/weekends, please call our after hours Nurse Triage line [phone]. Thank you.

## 2018-08-01 NOTE — PROGRESS NOTES
DATE OF VISIT: Aug 1, 2018    REASON FOR REFERRAL: Management of leukocytosis.    CHIEF COMPLAINT:   Chief Complaint   Patient presents with     Hematology     New Patient - Leukocytosis        HISTORY OF PRESENT ILLNESS:   This is a 33-year-old female patient with chronic history of leukocytosis. She has had intermittent leukocytosis without anemia. It has become persistent since February of 2015. 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 In addition to the spleen and tail of the pancreas, she also had her left adrenal gland removed. Subsequently, her white count has been higher than the normal range. Peripheral blood smear done was consistent with a reactive process. She does have significant abdominal obesity due to type 2 diabetes.  She was on lithium for personality disorder. She also has stage 3 chronic renal disease.  Most recent laboratory test revealed elevated white count at 19,000.  Hemoglobin is 12.  Sedimentation rate is 24.    REVIEW OF SYSTEMS:   Constitutional: Negative for fever, chills, and night sweats.  Skin: negative.  Eyes: negative.  Ears/Nose/Throat: negative.  Respiratory: No shortness of breath, dyspnea on exertion, cough, or hemoptysis.  Cardiovascular: negative.  Gastrointestinal: negative.  Genitourinary: negative.  Musculoskeletal: negative.  Neurologic: negative.  Psychiatric: negative.  Hematologic/Lymphatic/Immunologic: negative.  Endocrine: negative.    PAST MEDICAL HISTORY:   Past Medical History:   Diagnosis Date     Cervical high risk HPV (human papillomavirus) test positive 6/20/2017     Depressive disorder, not elsewhere classified      DVT (deep venous thrombosis) (H)      Dysmetabolic syndrome X      Esophageal reflux     GERD     Mild intermittent asthma      Other abnormal heart sounds     Heart murmur     Other specified types of schizophrenia, unspecified condition      Pancreatic  cancer (H)     left adrenal gland, partial pancreas, and spleen removed; no chemo or radiation.      Type II or unspecified type diabetes mellitus without mention of complication, not stated as uncontrolled      Unspecified disorder of tympanic membrane        PAST SURGICAL HISTORY:   Past Surgical History:   Procedure Laterality Date     ADRENALECTOMY       PANCREATECTOMY PARTIAL       SPLENECTOMY       SURGICAL HISTORY OF -   10/1/2000    Tympanoplasty     SURGICAL HISTORY OF -   10/1/2000    Tonsillectomy       ALLERGIES:   Allergies as of 08/01/2018 - Jose as Reviewed 08/01/2018   Allergen Reaction Noted     Acetaminophen Other (See Comments) 11/06/2007     Latex  03/28/2005     Latex Rash 11/06/2007       MEDICATIONS:   Current Outpatient Prescriptions   Medication Sig Dispense Refill     albuterol (PROAIR HFA/PROVENTIL HFA/VENTOLIN HFA) 108 (90 BASE) MCG/ACT Inhaler Inhale 2 puffs into the lungs every 6 hours as needed for shortness of breath / dyspnea or wheezing 1 Inhaler 3     amLODIPine (NORVASC) 5 MG tablet Take 1 tablet (5 mg) by mouth daily 90 tablet 1     ARIPiprazole (ABILIFY) 30 MG tablet Take 30 mg by mouth daily       blood glucose monitoring (NO BRAND SPECIFIED) test strip 1 strip by In Vitro route 3 times daily 100 each 11     blood glucose monitoring (SOFTCLIX) lancets Use to test blood sugar 3 times daily 100 each 11     Clozapine (CLOZARIL) 200 MG tablet Take 200 mg by mouth 2 times daily       Continuous Blood Gluc  (FREESTYLE BRIANNA READER) VANE 1 Device 3 times daily as needed 1 Device 3     continuous blood glucose monitoring (FREESTYLE BRIANNA) sensor For use with Freestyle Brianna Flash  for continuous monitioring of blood glucose levels. Replace sensor every 10 days. 3 each 11     dulaglutide (TRULICITY) 1.5 MG/0.5ML pen Inject 1.5 mg Subcutaneous every 7 days 6 mL 3     FLUoxetine (PROZAC) 20 MG capsule Take 20 mg by mouth daily       insulin glargine (LANTUS SOLOSTAR) 100  "UNIT/ML pen Inject 19 units daily 15 mL 2     lisinopril (PRINIVIL/ZESTRIL) 5 MG tablet Take 1 tablet (5 mg) by mouth daily 90 tablet 3     loratadine (CLARITIN) 10 MG tablet Take 1 tablet (10 mg) by mouth every morning 90 tablet 1     montelukast (SINGULAIR) 10 MG tablet Take 1 tablet (10 mg) by mouth At Bedtime 90 tablet 1     omeprazole (PRILOSEC) 20 MG CR capsule Take 1 capsule (20 mg) by mouth daily 1 capsule bid 60 capsule 5     ranitidine (ZANTAC) 300 MG tablet Take 1 tablet (300 mg) by mouth At Bedtime 90 tablet 1     ULTILET SHARPS CONTAINER 1QT MISC 1 Device as needed 1 each 11        FAMILY HISTORY:   Family History   Problem Relation Age of Onset     Respiratory Mother      ASTHMA     Allergies Mother      Depression Mother      HEART DISEASE Father      HEART VALVE REPLACEMENT     Hypertension Father      Diabetes Father      Depression Father      Respiratory Brother      Allergies Brother      Respiratory Sister      Allergies Sister      Depression Sister      Respiratory Sister      Allergies Sister      Depression Sister      KIDNEY DISEASE No family hx of         SOCIAL HISTORY:   Social History     Social History     Marital status: Single     Spouse name: N/A     Number of children: 0     Years of education: N/A     Social History Main Topics     Smoking status: Former Smoker     Packs/day: 1.00     Years: 4.00     Types: Cigarettes     Smokeless tobacco: Never Used      Comment: 15 cig/day     Alcohol use No     Drug use: No     Sexual activity: Yes     Partners: Female      Comment: Both male and female      Other Topics Concern     Parent/Sibling W/ Cabg, Mi Or Angioplasty Before 65f 55m? No     Social History Narrative       PHYSICAL EXAMINATION:   /45 (BP Location: Right arm, Patient Position: Sitting, Cuff Size: Adult Regular)  Pulse 95  Temp 98.5  F (36.9  C) (Tympanic)  Resp 16  Ht 1.626 m (5' 4\")  Wt 88.7 kg (195 lb 9.6 oz)  SpO2 98%  Breastfeeding? No  BMI 33.57 kg/m2  Wt " Readings from Last 10 Encounters:   08/01/18 88.7 kg (195 lb 9.6 oz)   07/10/18 87.1 kg (192 lb)   06/28/18 87.4 kg (192 lb 11.2 oz)   06/14/18 85.5 kg (188 lb 9.6 oz)   05/23/18 86.5 kg (190 lb 9.6 oz)   05/23/18 85.5 kg (188 lb 8 oz)   05/01/18 87.5 kg (193 lb)   04/11/18 85.8 kg (189 lb 1.6 oz)   02/21/18 89.8 kg (198 lb)   01/19/18 86.6 kg (191 lb)      ECOG performance status: 0  GENERAL APPEARANCE: Healthy, alert and in no acute distress.  HEENT: Sclerae anicteric. PERRLA. Oropharynx without ulcers, lesions, or thrush.  NECK: Supple. No asymmetry or masses.  LYMPHATICS: No palpable cervical, supraclavicular, axillary, or inguinal lymphadenopathy.  RESP: Lungs clear to auscultation bilaterally without rales, rhonchi or wheezes.  CARDIOVASCULAR: Regular rate and rhythm. Normal S1, S2; no S3 or S4. No murmur, gallop, or rub.  ABDOMEN: Soft, nontender. Bowel sounds normal. No palpable organomegaly or masses.  MUSCULOSKELETAL: Extremities without gross deformities noted. No edema of bilateral lower extremities.  SKIN: No suspicious lesions or rashes.  NEURO: Alert and oriented x 3. Cranial nerves II-XII grossly intact.  PSYCHIATRIC: Mentation and affect appear normal.    LABORATORY RESULTS:  Orders Only on 07/25/2018   Component Date Value Ref Range Status     Sodium 07/25/2018 139  133 - 144 mmol/L Final     Potassium 07/25/2018 4.9  3.4 - 5.3 mmol/L Final     Chloride 07/25/2018 109  94 - 109 mmol/L Final     Carbon Dioxide 07/25/2018 24  20 - 32 mmol/L Final     Anion Gap 07/25/2018 6  3 - 14 mmol/L Final     Glucose 07/25/2018 87  70 - 99 mg/dL Final     Urea Nitrogen 07/25/2018 36* 7 - 30 mg/dL Final     Creatinine 07/25/2018 1.65* 0.52 - 1.04 mg/dL Final     GFR Estimate 07/25/2018 36* >60 mL/min/1.7m2 Final    Non  GFR Calc     GFR Estimate If Black 07/25/2018 44* >60 mL/min/1.7m2 Final    African American GFR Calc     Calcium 07/25/2018 8.8  8.5 - 10.1 mg/dL Final     .    ASSESSMENT AND  PLAN:    (D72.829) Leukocytosis, unspecified type  (primary encounter diagnosis)  This is a 33-year-old female patient with persistent leukocytosis probably related to post splenectomy as well as reactive.  Today I will check Erythrocyte sedimentation rate auto, CRP inflammation, Blood Morphology Pathologist Review, CBC with platelets differential, Reticulocyte Count, Next Generation Sequencing   Oncology: Myeloproliferative Neoplasm Panel.  I will see the patient when these results are available.    (C25.9) Malignant neoplasm of pancreas, unspecified location of malignancy (H)  There is no clinical evidence of disease recurrence    (K21.9) Gastroesophageal reflux disease without esophagitis  Patient currently on omeprazole and ranitidine    (I10) Essential hypertension  Patient currently on lisinopril and Norvasc    (N18.3) CKD (chronic kidney disease) stage 3, GFR 30-59 ml/min  Creatinine has been stable    (E11.22,  N18.3,  Z79.4) Type 2 diabetes mellitus with stage 3 chronic kidney disease, with long-term current use of insulin (H)    (J45.21) Mild intermittent asthma with acute exacerbation  Patient currently on albuterol inhaler.    The patient is ready to learn, no apparent learning barriers were identified, Diagnosis and treatment plans were explained to the patient. The patient expressed understanding of the content. The patient questions were answered to her satisfaction.    Meghan Rosenthal MD    Chart documentation with Dragon Voice recognition Software. Although reviewed after completion, some words and grammatical errors may remain.

## 2018-08-01 NOTE — MR AVS SNAPSHOT
After Visit Summary   8/1/2018    Divina Delgadillo    MRN: [id number]           Patient Information     Date Of Birth          1985        Visit Information        Provider Department      8/1/2018 1:00 PM Meghan Rosenthal MD [facility] ONCOLOGY      Today's Diagnoses     Leukocytosis, unspecified type    -  1       Follow-ups after your visit        Follow-up notes from your care team     Return for lab ordered today, Schedule patient when results are back.      Your next 10 appointments already scheduled     Aug 09, 2018  2:00 PM CDT   Return Visit with Meghan Rosenthal MD   [facility] ([facility])    [facility]  [address]  [facility] [address]   [phone]            Aug 27, 2018  3:00 PM CDT   Diabetic Education with [place] [facility]   [facility] ([facility])    [address] [facility] [address]   [phone]            Jan 14, 2019 10:00 AM CST   LAB with LAB FIRST FLOOR [facility] ([facility])    [address] [place] [address] [id number] [phone]           Please do not eat 10-12 hours before your appointment if you are coming in fasting for labs on lipids, cholesterol, or glucose (sugar). This does not apply to pregnant women. Water, hot tea and black coffee (with nothing added) are okay. Do not drink other fluids, diet soda or chew gum.            Jan 14, 2019 10:30 AM CST   Return Visit with Sánchez Dias MD   [facility] ([facility])    [address] [place] [address] [id number] [phone]              Future tests that were ordered for you today     Open Future Orders        Priority Expected Expires Ordered    Erythrocyte sedimentation rate auto Routine 8/1/2018 8/1/2019 8/1/2018    Next Generation Sequencing Oncology: Myeloproliferative Neoplasm  "Panel Routine 8/1/2018 7/27/2019 8/1/2018            Who to contact     If you have questions or need follow up information about today's clinic visit or your schedule please contact [facility] directly at [phone].  Normal or non-critical lab and imaging results will be communicated to you by MyChart, letter or phone within 4 business days after the clinic has received the results. If you do not hear from us within 7 days, please contact the clinic through MyChart or phone. If you have a critical or abnormal lab result, we will notify you by phone as soon as possible.  Submit refill requests through [employer] or call your pharmacy and they will forward the refill request to us. Please allow 3 business days for your refill to be completed.          Additional Information About Your Visit        Care EveryWhere ID     This is your Care EveryWhere ID. This could be used by other organizations to access your [facility] medical records  [id number]        Your Vitals Were     Pulse Temperature Respirations Height Pulse Oximetry Breastfeeding?    95 98.5  F (36.9  C) (Tympanic) 16 1.626 m (5' 4\") 98% No    BMI (Body Mass Index)                   33.57 kg/m2            Blood Pressure from Last 3 Encounters:   08/01/18 137/45   07/10/18 129/74   06/28/18 139/77    Weight from Last 3 Encounters:   08/01/18 88.7 kg (195 lb 9.6 oz)   07/10/18 87.1 kg (192 lb)   06/28/18 87.4 kg (192 lb 11.2 oz)              We Performed the Following     Blood Morphology Pathologist Review     CBC with platelets differential     CRP inflammation     Reticulocyte Count        Primary Care Provider Office Phone # Fax #    Jaleesa VERONIQUE Tarango [phone] [address] [facility] [address]        Equal Access to Services     FANG HAY : Elsa Santiago, claudia bhardwaj, aaron costello. So [facility] [phone].    ATENCIÓN: Si habla " español, tiene a gil disposición servicios gratuitos de asistencia lingüística. Carlo garrison [phone].    We comply with applicable federal civil rights laws and [place] laws. We do not discriminate on the basis of race, color, national origin, age, disability, sex, sexual orientation, or gender identity.            Thank you!     Thank you for choosing [facility]  for your care. Our goal is always to provide you with excellent care. Hearing back from our patients is one way we can continue to improve our services. Please take a few minutes to complete the written survey that you may receive in the mail after your visit with us. Thank you!             Your Updated Medication List - Protect others around you: Learn how to safely use, store and throw away your medicines at www.disposemymeds.org.          This list is accurate as of 8/1/18  1:42 PM.  Always use your most recent med list.                   Brand Name Dispense Instructions for use Diagnosis    ABILIFY 30 MG tablet   Generic drug:  ARIPiprazole      Take 30 mg by mouth daily        albuterol 108 (90 Base) MCG/ACT Inhaler    PROAIR HFA/PROVENTIL HFA/VENTOLIN HFA    1 Inhaler    Inhale 2 puffs into the lungs every 6 hours as needed for shortness of breath / dyspnea or wheezing    Mild intermittent asthma with acute exacerbation       amLODIPine 5 MG tablet    NORVASC    90 tablet    Take 1 tablet (5 mg) by mouth daily    Essential hypertension       blood glucose monitoring lancets     100 each    Use to test blood sugar 3 times daily    Type 2 diabetes mellitus with stage 3 chronic kidney disease, with long-term current use of insulin (H)       blood glucose monitoring test strip    no brand specified    100 each    1 strip by In Vitro route 3 times daily    Type 2 diabetes mellitus with stage 3 chronic kidney disease, with long-term current use of insulin (H)       Clozapine 200 MG tablet    CLOZARIL     Take 200 mg by mouth 2 times daily         continuous blood glucose monitoring sensor     3 each    For use with Freestyle Yash Flash  for continuous monitioring of blood glucose levels. Replace sensor every 10 days.    Type 2 diabetes mellitus with stage 3 chronic kidney disease, with long-term current use of insulin (H)       dulaglutide 1.5 MG/0.5ML pen    TRULICITY    6 mL    Inject 1.5 mg Subcutaneous every 7 days    Type 2 diabetes mellitus with stage 3 chronic kidney disease, without long-term current use of insulin (H)       FLUoxetine 20 MG capsule    PROzac     Take 20 mg by mouth daily        FREESTYLE YASH READER Nakia     1 Device    1 Device 3 times daily as needed    Type 2 diabetes mellitus with stage 3 chronic kidney disease, with long-term current use of insulin (H)       insulin glargine 100 UNIT/ML injection    LANTUS SOLOSTAR    15 mL    Inject 19 units daily    Type 2 diabetes mellitus with stage 3 chronic kidney disease, with long-term current use of insulin (H)       lisinopril 5 MG tablet    PRINIVIL/ZESTRIL    90 tablet    Take 1 tablet (5 mg) by mouth daily    Essential hypertension       loratadine 10 MG tablet    CLARITIN    90 tablet    Take 1 tablet (10 mg) by mouth every morning    Chronic seasonal allergic rhinitis, unspecified trigger       montelukast 10 MG tablet    SINGULAIR    90 tablet    Take 1 tablet (10 mg) by mouth At Bedtime    Mild intermittent asthma with acute exacerbation       omeprazole 20 MG CR capsule    priLOSEC    60 capsule    Take 1 capsule (20 mg) by mouth daily 1 capsule bid    Gastroesophageal reflux disease without esophagitis       ranitidine 300 MG tablet    ZANTAC    90 tablet    Take 1 tablet (300 mg) by mouth At Bedtime    Gastroesophageal reflux disease without esophagitis       ULTILET SHARPS CONTAINER 1QT Misc     1 each    1 Device as needed    Type 2 diabetes mellitus with stage 3 chronic kidney disease, without long-term current use of insulin (H)

## 2018-08-01 NOTE — NURSING NOTE
"Oncology Rooming Note    August 1, 2018 1:19 PM   Divina Delgadillo is a 33 year old female who presents for:    Chief Complaint   Patient presents with     Hematology     New Patient - Leukocytosis      Initial Vitals: /45 (BP Location: Right arm, Patient Position: Sitting, Cuff Size: Adult Regular)  Pulse 95  Temp 98.5  F (36.9  C) (Tympanic)  Resp 16  Ht 1.626 m (5' 4\")  Wt 88.7 kg (195 lb 9.6 oz)  SpO2 98%  Breastfeeding? No  BMI 33.57 kg/m2 Estimated body mass index is 33.57 kg/(m^2) as calculated from the following:    Height as of this encounter: 1.626 m (5' 4\").    Weight as of this encounter: 88.7 kg (195 lb 9.6 oz). Body surface area is 2 meters squared.  No Pain (0) Comment: Data Unavailable   No LMP recorded. Patient is not currently having periods (Reason: IUD).  Allergies reviewed: Yes  Medications reviewed: Yes    Medications: Medication refills not needed today.  Pharmacy name entered into [facility]:      [facility] - - [place], [place] - [id number] [facility]    Clinical concerns: New Patient - Leukocytosis.    7 minutes for nursing intake (face to face time)     Ilana Ashton CMA              "

## 2018-08-02 LAB — COPATH REPORT: NORMAL

## 2018-08-10 LAB — COPATH REPORT: NORMAL

## 2018-08-13 ENCOUNTER — HOSPITAL ENCOUNTER (OUTPATIENT)
Dept: CT IMAGING | Facility: CLINIC | Age: 33
Discharge: HOME | End: 2018-08-13
Attending: INTERNAL MEDICINE | Admitting: INTERNAL MEDICINE
Payer: MEDICARE

## 2018-08-13 ENCOUNTER — ONCOLOGY VISIT (OUTPATIENT)
Dept: ONCOLOGY | Facility: CLINIC | Age: 33
End: 2018-08-13
Attending: INTERNAL MEDICINE
Payer: MEDICARE

## 2018-08-13 VITALS
HEART RATE: 103 BPM | DIASTOLIC BLOOD PRESSURE: 57 MMHG | RESPIRATION RATE: 16 BRPM | BODY MASS INDEX: 32.78 KG/M2 | TEMPERATURE: 99.8 F | HEIGHT: 64 IN | SYSTOLIC BLOOD PRESSURE: 142 MMHG | WEIGHT: 192 LBS | OXYGEN SATURATION: 96 %

## 2018-08-13 DIAGNOSIS — N18.30 CKD (CHRONIC KIDNEY DISEASE) STAGE 3, GFR 30-59 ML/MIN (H): ICD-10-CM

## 2018-08-13 DIAGNOSIS — C25.9 MALIGNANT NEOPLASM OF PANCREAS, UNSPECIFIED LOCATION OF MALIGNANCY (H): ICD-10-CM

## 2018-08-13 DIAGNOSIS — Z72.0 NICOTINE ABUSE: ICD-10-CM

## 2018-08-13 DIAGNOSIS — C25.9 MALIGNANT NEOPLASM OF PANCREAS, UNSPECIFIED LOCATION OF MALIGNANCY (H): Primary | ICD-10-CM

## 2018-08-13 DIAGNOSIS — I10 ESSENTIAL HYPERTENSION: ICD-10-CM

## 2018-08-13 DIAGNOSIS — F31.9 BIPOLAR AFFECTIVE DISORDER, REMISSION STATUS UNSPECIFIED (H): ICD-10-CM

## 2018-08-13 DIAGNOSIS — K21.9 GASTROESOPHAGEAL REFLUX DISEASE WITHOUT ESOPHAGITIS: ICD-10-CM

## 2018-08-13 DIAGNOSIS — E78.5 HYPERLIPIDEMIA LDL GOAL <100: ICD-10-CM

## 2018-08-13 PROCEDURE — 99214 OFFICE O/P EST MOD 30 MIN: CPT | Performed by: INTERNAL MEDICINE

## 2018-08-13 PROCEDURE — G0463 HOSPITAL OUTPT CLINIC VISIT: HCPCS

## 2018-08-13 PROCEDURE — 74176 CT ABD & PELVIS W/O CONTRAST: CPT

## 2018-08-13 RX ORDER — CHLORTHALIDONE 25 MG/1
12.5 TABLET ORAL
COMMUNITY
Start: 2017-04-27 | End: 2019-01-31

## 2018-08-13 RX ORDER — SIMVASTATIN 20 MG
20 TABLET ORAL
COMMUNITY
Start: 2017-02-21 | End: 2019-01-31

## 2018-08-13 RX ORDER — FLURBIPROFEN SODIUM 0.3 MG/ML
SOLUTION/ DROPS OPHTHALMIC
Refills: 11 | COMMUNITY
Start: 2018-07-02 | End: 2019-01-14

## 2018-08-13 RX ORDER — LAMOTRIGINE 25 MG/1
TABLET ORAL
Refills: 0 | Status: ON HOLD | COMMUNITY
Start: 2018-07-30 | End: 2020-05-26

## 2018-08-13 ASSESSMENT — PAIN SCALES - GENERAL: PAINLEVEL: EXTREME PAIN (8)

## 2018-08-13 NOTE — PATIENT INSTRUCTIONS
Dr. Rosenthal would like you to have a CT scan done and we will call you with results and plan.         When you are in need of a refill, please call your pharmacy and they will send us a request.      Copy of appointments, and after visit summary (AVS) given to patient.      If you have any questions please call Jenny Bradford RN, BSN Oncology Hematology  [facility] [phone]. For questions after business hours, or on holidays/weekends, please call our after hours Nurse Triage line [phone]. Thank you.

## 2018-08-13 NOTE — PROGRESS NOTES
Hematology/ Oncology Follow-up Visit:  Aug 13, 2018    Reason for Visit:   Chief Complaint   Patient presents with     Oncology Clinic Visit     1 week recheck Leukocytosis, unspecified type        Interval History:  Is here today to discuss causes of leukocytosis.  She has been feeling well without any recent complaints weight loss night sweats fever or chills.  She has intermittent abdominal pain.  Continues to smoke and now switching to E cigarettes    Review Of Systems:  Constitutional: Negative for fever, chills, and night sweats.  Skin: negative.  Eyes: negative.  Ears/Nose/Throat: negative.  Respiratory: No shortness of breath, dyspnea on exertion, cough, or hemoptysis.  Cardiovascular: negative.  Gastrointestinal: negative.  Genitourinary: negative.  Musculoskeletal: negative.  Neurologic: negative.  Psychiatric: negative.  Hematologic/Lymphatic/Immunologic: negative.  Endocrine: negative.    All other ROS negative unless mentioned in interval history.    Past medical, social, surgical, and family histories reviewed.    Allergies:  Allergies as of 08/13/2018 - Jose as Reviewed 08/13/2018   Allergen Reaction Noted     Acetaminophen Other (See Comments) 11/06/2007     Latex  03/28/2005     Latex Rash 11/06/2007       Current Medications:  Current Outpatient Prescriptions   Medication Sig Dispense Refill     albuterol (PROAIR HFA/PROVENTIL HFA/VENTOLIN HFA) 108 (90 BASE) MCG/ACT Inhaler Inhale 2 puffs into the lungs every 6 hours as needed for shortness of breath / dyspnea or wheezing 1 Inhaler 3     amLODIPine (NORVASC) 5 MG tablet Take 1 tablet (5 mg) by mouth daily 90 tablet 1     ARIPiprazole (ABILIFY) 30 MG tablet Take 30 mg by mouth daily       blood glucose monitoring (NO BRAND SPECIFIED) test strip 1 strip by In Vitro route 3 times daily 100 each 11     blood glucose monitoring (SOFTCLIX) lancets Use to test blood sugar 3 times daily 100 each 11     Clozapine (CLOZARIL) 200 MG tablet Take 200 mg by  "mouth 2 times daily       Continuous Blood Gluc  (FREESTYLE BRIANNA READER) VANE 1 Device 3 times daily as needed 1 Device 3     continuous blood glucose monitoring (FREESTYLE BRIANNA) sensor For use with Freestyle Brianna Flash  for continuous monitioring of blood glucose levels. Replace sensor every 10 days. 3 each 11     dulaglutide (TRULICITY) 1.5 MG/0.5ML pen Inject 1.5 mg Subcutaneous every 7 days 6 mL 3     FLUoxetine (PROZAC) 20 MG capsule Take 20 mg by mouth daily       insulin glargine (LANTUS SOLOSTAR) 100 UNIT/ML pen Inject 19 units daily 15 mL 2     lisinopril (PRINIVIL/ZESTRIL) 5 MG tablet Take 1 tablet (5 mg) by mouth daily 90 tablet 3     loratadine (CLARITIN) 10 MG tablet Take 1 tablet (10 mg) by mouth every morning 90 tablet 1     montelukast (SINGULAIR) 10 MG tablet Take 1 tablet (10 mg) by mouth At Bedtime 90 tablet 1     omeprazole (PRILOSEC) 20 MG CR capsule Take 1 capsule (20 mg) by mouth daily 1 capsule bid 60 capsule 5     ranitidine (ZANTAC) 300 MG tablet Take 1 tablet (300 mg) by mouth At Bedtime 90 tablet 1     simvastatin (ZOCOR) 20 MG tablet Take 20 mg by mouth       Sod Fluoride-Potassium Nitrate 1.1-5 % PSTE        ULTILET SHARPS CONTAINER 1QT MISC 1 Device as needed 1 each 11     chlorthalidone (HYGROTON) 25 MG tablet Take 12.5 mg by mouth       lamoTRIgine (LAMICTAL) 25 MG tablet Take 1 tablet by mouth at bedtime x2 weeks, then 2 tablets at bedtime thereafter  0     ULTICARE MINI 31G X 6 MM insulin pen needle AS DIRECTED WITH LANTUS PENS  11        Physical Exam:  /57 (BP Location: Right arm, Patient Position: Sitting, Cuff Size: Adult Regular)  Pulse 103  Temp 99.8  F (37.7  C) (Tympanic)  Resp 16  Ht 1.626 m (5' 4.02\")  Wt 87.1 kg (192 lb)  SpO2 96%  Breastfeeding? No  BMI 32.94 kg/m2  Wt Readings from Last 12 Encounters:   08/13/18 87.1 kg (192 lb)   08/01/18 88.7 kg (195 lb 9.6 oz)   07/10/18 87.1 kg (192 lb)   06/28/18 87.4 kg (192 lb 11.2 oz)   06/14/18 " 85.5 kg (188 lb 9.6 oz)   05/23/18 86.5 kg (190 lb 9.6 oz)   05/23/18 85.5 kg (188 lb 8 oz)   05/01/18 87.5 kg (193 lb)   04/11/18 85.8 kg (189 lb 1.6 oz)   02/21/18 89.8 kg (198 lb)   01/19/18 86.6 kg (191 lb)   01/16/18 89.8 kg (198 lb)     ECOG performance status: 0  GENERAL APPEARANCE: Healthy, alert and in no acute distress.  HEENT: Sclerae anicteric. PERRLA. Oropharynx without ulcers, lesions, or thrush.  NECK: Supple. No asymmetry or masses.  LYMPHATICS: No palpable cervical, supraclavicular, axillary, or inguinal lymphadenopathy.  RESP: Lungs clear to auscultation bilaterally without rales, rhonchi or wheezes.  CARDIOVASCULAR: Regular rate and rhythm. Normal S1, S2; no S3 or S4. No murmur, gallop, or rub.  ABDOMEN: Soft, tenderness in the periumbilical area. Bowel sounds normal. No palpable organomegaly or masses.  MUSCULOSKELETAL: Extremities without gross deformities noted. No edema of bilateral lower extremities.  SKIN: No suspicious lesions or rashes.  NEURO: Alert and oriented x 3. Cranial nerves II-XII grossly intact.  PSYCHIATRIC: Mentation and affect appear normal.    Laboratory/Imaging Studies:  Oncology Visit on 08/01/2018   Component Date Value Ref Range Status     CRP Inflammation 08/01/2018 5.4  0.0 - 8.0 mg/L Final     Copath Report 08/01/2018    Final                    Value:Patient Name: MAURICIO HWANG  MR#: [id number]  Specimen #: [id number]  Collected: 8/1/2018  Received: 8/2/2018  Reported: 8/2/2018 15:45  Ordering Phy(s): PUMA VIRGEN    For improved result formatting, select 'View Enhanced Report Format' under   Linked Documents section.    TEST(S):  Peripheral Smear Morphology    FINAL DIAGNOSIS:  Peripheral Smear Morphology:  - Mild to moderate leukocytosis with absolute neutrophilia with minimal   left shift (rare circulating myelocyte  and metamyelocyte on scan); no blasts or dysplasia seen  - Borderline lymphocytosis without atypia.    COMMENT:  The patient has a history of  neutrophilia since 2015 and is a former   smoker.  The neutrophilia may be  reactive, likely related to an infectious or inflammatory reaction or mild   physiologic stress. If the patient  is a smoker it should also be noted that patient's who smoke can have   persisting leukocytosis even after  cessation of smoking (Reference: Smoking-induced leukocytosis can persist                             after cessation of smoking.  Arch  Med Res. 2004  May-[place]; 35(3): 246-50).  Next generation sequencing is   presently pending for the  myeloproliferative neoplasm panel.    Electronically signed out by:    KENNEDY Nobles M.D.    CLINICAL HISTORY:  33-year-old female.    PERIPHERAL BLOOD DATA:  PERIPHERAL BLOOD DATA (Date: 8/1/18)  Patient Value (Reference Range >18 year old female)  18.95    WBC        (4.0-11.0 x 10*9/L)  3.85    RBC         (3.8-5.2 x 10*12/L)  12.0    HGB         (11.7-15.7 g/dL)  37.7    HCT         (35.0-47.0 %)  97.9    MCV        (78-100fL)  31.2     MCH        (26.5-33.0 pg)  31.8     MCHC     (31.5-36.5 g/dL)  13.6     RDW       (10.0-15.0 %)  322     PLT         (150-450 x 10*9/L)    PERIPHERAL BLOOD DIFFERENTIAL - Manual 200 cells.  (Reference ranges >18 year old)    Percent  61.0%  Neutrophils  29.5%  Lymphocytes  7.0%   Monocytes  2.0%   Eosinophils  0.5%   Basophils    Absolute  11.56  Neutrophils  (Ref normal 1.6 - 8.3 x 10*9/L)  5.59   Lymphocytes  (                          Ref normal 0.8 - 5.3 x 10*9/L)  1.33   Monocytes  (Ref normal 0 -1.3 x 10*9/L)  0.38   Eosinophils  (Ref normal 0 - 0.7 x 10*9/L)  0.09   Basophils  (Ref normal 0 - 0.2 x 10*9/L)    PERIPHERAL BLOOD MORPHOLOGY:    ERYTHROCYTES:  The red cells are normocytic, normochromic and normal in   number for the patient's age and  gender. No significant anisopoikilocytosis is seen. No morphologic   features of hemolysis or increased  polychromasia are identified.  No parasites are identified.    LEUKOCYTES:  The leukocytes are  morphologically normal and moderately   increased in number with a mature  neutrophilia and borderline lymphocytosis.  Rare neutrophil myelocytes and   metamyelocytes are seen on scan.  No blasts or evidence of dysplasia is seen. No atypical lymphoid cells are   seen. No parasites or parasitic  inclusions are seen.    PLATELETS:  The platelets are normal in number and morphology. (Dictated   by: MARINA Nobles MD 08/02/2018)    CPT Codes:  A: 86922-GGST    TESTING LAB LOC                          ATION:  [facility], [address] [address] [phone]    COLLECTION SITE:  Client:  [facility]  Location:  [facility] (K)       WBC 08/01/2018 19.0* 4.0 - 11.0 10e9/L Final     RBC Count 08/01/2018 3.85  3.8 - 5.2 10e12/L Final     Hemoglobin 08/01/2018 12.0  11.7 - 15.7 g/dL Final     Hematocrit 08/01/2018 37.7  35.0 - 47.0 % Final     MCV 08/01/2018 98  78 - 100 fl Final     MCH 08/01/2018 31.2  26.5 - 33.0 pg Final     MCHC 08/01/2018 31.8  31.5 - 36.5 g/dL Final     RDW 08/01/2018 13.6  10.0 - 15.0 % Final     Platelet Count 08/01/2018 322  150 - 450 10e9/L Final     Diff Method 08/01/2018 Slide to be reviewed by Pathologist   Final     % Retic 08/01/2018 2.0  0.5 - 2.0 % Final     Absolute Retic 08/01/2018 75.5  25 - 95 10e9/L Final     Sed Rate 08/01/2018 24* 0 - 20 mm/h Final     Copath Report 08/01/2018    Final                    Value:Patient Name: MAURICIO HWANG  MR#: [id number]  Specimen #: [id number]  Collected: 8/1/2018 13:58  Received: 8/2/2018 09:27  Reported: 8/10/2018 12:29  Ordering Phy(s): PUMA VIRGEN  Additional Phy(s): HETAL WASHINGTON    For improved result formatting, select 'View Enhanced Report Format' under   Linked Documents section.  _________________________________________    TEST(S) REQUESTED:  Next Generation Sequencing Oncology MPN    SPECIMEN  DESCRIPTION:  Blood    SIGNIFICANT RESULTS    ---------------------------------------------------------------------  Detected Alterations of Known or Potential Pathogenicity: None    Detected Alterations of Uncertain Significance: None    Genes with No Detected Alterations of the Amino Acid Sequence: CALR, JAK2,   MPL  ---------------------------------------------------------------------     INTERPRETATION OF RESULTS     ---------------------------------------------------------------------   No mutations were identifie                          d in the analyzed regions of CALR, JAK2, KIT   and MPL genes.     Nearly 100% of polycythemia vera (PV) cases have either JAK2 V617F or an   exon 12 mutation. As this patient's  sample is negative for mutations in these regions, PV is unlikely. JAK2,   CALR, or MPL mutations have also been  reported in both essential thrombocythemia (ET) and primary myelofibrosis   (PMF) at variable prevalence [1-3].  However, the absence of mutations does not exclude these diagnoses as   approximately 10-15% of ET or PMF may be  negative for mutations in these 3 genes.     The patient has neutrophilia, if there is a consideration for a less   common myeloid neoplasm, ordering an  expanded MPN panel may be helpful. This could be performed on the specimen   in the laboratory. If desired,  please call the lab at [phone] to coordinate.     Correlation with clinical information, morphology, and other diagnostic   tests is indicated.     References:    1. Stacie SH, Renato E, Gurinder HENSON, et al. WHO Cla                          ssification of Tumours   of Haematopoietic and Lymphoid  Tissues (IARC WHO Classification of Tumours) Revised Edition, IARC Press;   2017.    2. Omar COULTER, Da RL, Heather T, et al.  mutations in   myeloproliferative and other myeloid  disorders: a study of 1182 patients. Blood. 2006;108(10):3472-6.    3. Jacob SHERIFF, Sayda H, Adi ZHANG, et al. Somatic  mutations of   calreticulin in myeloproliferative  neoplasms. N Engl J Med. 2013;369(25):2997-24.    4. Robert REYNOSO, Karl J, Cayden R. Mastocytosis: a mutated KIT receptor   induced myeloproliferative disorder.  Oncotarget. 2015 Jul 30;6(30):22466-96. Review. PubMed PMID: 51789006;   PubMed Central PMCID: [id number].     ---------------------------------------------------------------------    TEST DETAILS    ---------------------------------------------------------------------  Methodology  ---------------------------------------------------------------------  Genomic DNA is extracted from the sample and sequencing li                          braries are   prepared using an amplicon-based target  enrichment method using a [employer] systems or a custom   developed low-input PCR method. The  enriched DNA libraries are sequenced on an Illumina MiSeq instrument, and   FASTQ files are processed through a  custom designed bioinformatics pipeline [based on methods described in   Paul et al. Genome Med. 2015 doi:  10.1186/e56852-768-4831-1 and Henyeisoner et al. Phyllis. Transl. Med. 2018 doi:   10.39059/tiffany.2018.05.07]. Amplicons  with less than 500X minimum coverage are flagged for limited analytic   performance. Variant call files (vcf)  are filtered to remove calls with variant allele fractions (VAF) less than   thresholds defined for single  nucleotide variants (5-10%) and insertion/deletion variants (1-5%).   Clinically relevant mutations from this  filtered variant list are annotated by a pathologist with [employer]   software and reported. The assay bed  file and 5% SNV hotspot list are available upon request.                           The analytic   accuracy of the assay is 99%. Sequenced  regions of the clinically ordered gene set are identified in the table   below.    Gene(NM Reference): Exons covered  CALR (NM_004343.3): 9; JAK2 (NM_004972.3): 12-14,16,20; MPL (NM_005373.2):   10    Less than 500X  coverage was achieved in portions of the following exons;   additional mutations in these regions  cannot be entirely excluded: None    ---------------------------------------------------------------------  Limitations  ---------------------------------------------------------------------  This amplicon-based NGS assay is designed to detect recurring, clinically   relevant mutations in selected exons  of the tested genes. Sequence variants occurring within primer binding   sequences may cause allele dropout and  not be detected. The validated allele frequency thresholds for reporting   positive results range from 1% to 10%  dependent on the type of mutation as follows: 5% for single nucleotide   variants within defined                           hotspots (list  available upon request); 10% for all other single nucleotide variants; 1%   for insertion-deletion mutations  greater than 3bp, and 5% for insertion-deletion mutations 3bp or smaller.   Therefore, the tumor cell population  must comprise at least 10-20% of the submitted specimen. Specimens with   borderline tumor cellularity may lead  to false negative results. Caution is advised for the interpretation of   negative results, and correlation with  morphology and other laboratory results is recommended to ensure adequate   representation of the cell  population of interest. This assay is not designed to detect large-scale   genomic rearrangement, gene deletion,  or gene amplification. Such somatic alterations may be relevant and   correlation with known genomic  characteristics for this patient's specific tumor is recommended. Benign   or likely benign variants (including  germline polymorphisms) are not reported.  --------------------------------------------------------                          -------------  Disclaimer  ---------------------------------------------------------------------  This test was developed and its performance characteristics determined  by   the [facility], [facility] Laboratory. It has not been cleared or   approved by the FDA. The laboratory is  regulated under CLIA as qualified to perform high-complexity testing. This   test is used for clinical purposes.  It should not be regarded as investigational or for research.    A resident/fellow in an accredited training program was involved in the   selection of testing, review of  laboratory data, and/or interpretation of this case. I, as the senior   physician, attest that I: (i) confirmed  appropriate testing, (ii) examined the relevant raw data for the   specimen(s); and (iii) rendered or confirmed  the interpretation(s).    ---------------------------------------------------------------------  [employer] Disclaimer  -------------------------------------------                          --------------------------  This report was produced using software licensed by [employer].   [employer] software is designed to be  used in clinical applications solely as a tool to enhance medical utility   and improve operational efficiency.  The use of [employer] software is not a substitute for medical judgment   and [employer] in no way holds  itself out as having or providing independent medical judgment or   diagnostic services. [employer] is not  liable with respect to any treatment or diagnosis made in connection with   this report.    [employer] Rules Version: [id number]  [employer] Application Version: [id number]    ---------------------------------------------------------------------  Electronic Signature  ---------------------------------------------------------------------  Electronically signed by: Melina Ziegler MD  08/10/2018    Electronically Signed Out By:  Melina Ziegler M.D. [facility]    CPT Codes:  A: -MXARRY, NAIMA                          2MPNPNGSOTC, MPLMPNPNGSOTC, CALCCALRPNGSTC2    TESTING LAB  LOCATION:  [facility]  [address], [facility] [address]  [address] [place] [id number] [phone]    COLLECTION SITE:  Client:  [facility]  Location:  [facility] (K)          No results found for this or any previous visit (from the past 744 hour(s)).    Assessment and plan:  (C25.9) Malignant neoplasm of pancreas, unspecified location of malignancy (H)    Clinical evidence of disease recurrence.    Leukocytosis  I reviewed with the patient today causes of leukocytosis.  There is no evidence of chronic leukemia or myeloproliferative disorder.  The patient leukocytosis probably related to reactive phenomenon secondary to smoking, infection, inflammation, recurrent malignancy.  I will him going to arrange for repeating CT scan of the abdomen follow-up for pancreatic malignancy.  (K21.9) Gastroesophageal reflux disease without esophagitis  The patient currently on prednisone 20 mg orally daily and ranitidine 300 mg daily.    (E78.5) Hyperlipidemia LDL goal <100  Patient currently on Zocor 20 mg orally daily.    (N18.3) CKD (chronic kidney disease) stage 3, GFR 30-59 ml/min  Creatinine has been stable    (I10) Essential hypertension  Currently on Norvasc 5 mg orally daily.  chlorthalidone 25 mg orally daily.  Lisinopril 5 mg orally daily.    (F31.9) Bipolar affective disorder, remission status unspecified (H)  Patient currently on Prozac 20 mg orally daily.    (Z72.0) Nicotine abuse  I strongly emphasized the importance of quitting smoking.      The patient is ready to learn, no apparent learning barriers were identified.  Diagnosis and treatment plans were explained to the patient. The patient expressed understanding of the content. The patient asked appropriate questions. The patient questions were answered to her satisfaction.    Chart documentation with Dragon Voice recognition Software. Although reviewed after completion, some words and grammatical errors may  remain.

## 2018-08-13 NOTE — LETTER
8/13/2018         RE: Divina Delgadillo  [address] [address] [place] [id number]        Dear Colleague,    Thank you for referring your patient, Divina Delgadillo, to the [facility]. Please see a copy of my visit note below.    Hematology/ Oncology Follow-up Visit:  Aug 13, 2018    Reason for Visit:   Chief Complaint   Patient presents with     Oncology Clinic Visit     1 week recheck Leukocytosis, unspecified type        Interval History:  Is here today to discuss causes of leukocytosis.  She has been feeling well without any recent complaints weight loss night sweats fever or chills.  She has intermittent abdominal pain.  Continues to smoke and now switching to E cigarettes    Review Of Systems:  Constitutional: Negative for fever, chills, and night sweats.  Skin: negative.  Eyes: negative.  Ears/Nose/Throat: negative.  Respiratory: No shortness of breath, dyspnea on exertion, cough, or hemoptysis.  Cardiovascular: negative.  Gastrointestinal: negative.  Genitourinary: negative.  Musculoskeletal: negative.  Neurologic: negative.  Psychiatric: negative.  Hematologic/Lymphatic/Immunologic: negative.  Endocrine: negative.    All other ROS negative unless mentioned in interval history.    Past medical, social, surgical, and family histories reviewed.    Allergies:  Allergies as of 08/13/2018 - Jose as Reviewed 08/13/2018   Allergen Reaction Noted     Acetaminophen Other (See Comments) 11/06/2007     Latex  03/28/2005     Latex Rash 11/06/2007       Current Medications:  Current Outpatient Prescriptions   Medication Sig Dispense Refill     albuterol (PROAIR HFA/PROVENTIL HFA/VENTOLIN HFA) 108 (90 BASE) MCG/ACT Inhaler Inhale 2 puffs into the lungs every 6 hours as needed for shortness of breath / dyspnea or wheezing 1 Inhaler 3     amLODIPine (NORVASC) 5 MG tablet Take 1 tablet (5 mg) by mouth daily 90 tablet 1     ARIPiprazole (ABILIFY) 30 MG tablet Take 30 mg by mouth daily       blood glucose  "monitoring (NO BRAND SPECIFIED) test strip 1 strip by In Vitro route 3 times daily 100 each 11     blood glucose monitoring (SOFTCLIX) lancets Use to test blood sugar 3 times daily 100 each 11     Clozapine (CLOZARIL) 200 MG tablet Take 200 mg by mouth 2 times daily       Continuous Blood Gluc  (FREESTYLE BRIANNA READER) VANE 1 Device 3 times daily as needed 1 Device 3     continuous blood glucose monitoring (FREESTYLE BRIANNA) sensor For use with Freestyle Brianna Flash  for continuous monitioring of blood glucose levels. Replace sensor every 10 days. 3 each 11     dulaglutide (TRULICITY) 1.5 MG/0.5ML pen Inject 1.5 mg Subcutaneous every 7 days 6 mL 3     FLUoxetine (PROZAC) 20 MG capsule Take 20 mg by mouth daily       insulin glargine (LANTUS SOLOSTAR) 100 UNIT/ML pen Inject 19 units daily 15 mL 2     lisinopril (PRINIVIL/ZESTRIL) 5 MG tablet Take 1 tablet (5 mg) by mouth daily 90 tablet 3     loratadine (CLARITIN) 10 MG tablet Take 1 tablet (10 mg) by mouth every morning 90 tablet 1     montelukast (SINGULAIR) 10 MG tablet Take 1 tablet (10 mg) by mouth At Bedtime 90 tablet 1     omeprazole (PRILOSEC) 20 MG CR capsule Take 1 capsule (20 mg) by mouth daily 1 capsule bid 60 capsule 5     ranitidine (ZANTAC) 300 MG tablet Take 1 tablet (300 mg) by mouth At Bedtime 90 tablet 1     simvastatin (ZOCOR) 20 MG tablet Take 20 mg by mouth       Sod Fluoride-Potassium Nitrate 1.1-5 % PSTE        ULTILET SHARPS CONTAINER 1QT MISC 1 Device as needed 1 each 11     chlorthalidone (HYGROTON) 25 MG tablet Take 12.5 mg by mouth       lamoTRIgine (LAMICTAL) 25 MG tablet Take 1 tablet by mouth at bedtime x2 weeks, then 2 tablets at bedtime thereafter  0     ULTICARE MINI 31G X 6 MM insulin pen needle AS DIRECTED WITH LANTUS PENS  11        Physical Exam:  /57 (BP Location: Right arm, Patient Position: Sitting, Cuff Size: Adult Regular)  Pulse 103  Temp 99.8  F (37.7  C) (Tympanic)  Resp 16  Ht 1.626 m (5' 4.02\") "  Wt 87.1 kg (192 lb)  SpO2 96%  Breastfeeding? No  BMI 32.94 kg/m2  Wt Readings from Last 12 Encounters:   08/13/18 87.1 kg (192 lb)   08/01/18 88.7 kg (195 lb 9.6 oz)   07/10/18 87.1 kg (192 lb)   06/28/18 87.4 kg (192 lb 11.2 oz)   06/14/18 85.5 kg (188 lb 9.6 oz)   05/23/18 86.5 kg (190 lb 9.6 oz)   05/23/18 85.5 kg (188 lb 8 oz)   05/01/18 87.5 kg (193 lb)   04/11/18 85.8 kg (189 lb 1.6 oz)   02/21/18 89.8 kg (198 lb)   01/19/18 86.6 kg (191 lb)   01/16/18 89.8 kg (198 lb)     ECOG performance status: 0  GENERAL APPEARANCE: Healthy, alert and in no acute distress.  HEENT: Sclerae anicteric. PERRLA. Oropharynx without ulcers, lesions, or thrush.  NECK: Supple. No asymmetry or masses.  LYMPHATICS: No palpable cervical, supraclavicular, axillary, or inguinal lymphadenopathy.  RESP: Lungs clear to auscultation bilaterally without rales, rhonchi or wheezes.  CARDIOVASCULAR: Regular rate and rhythm. Normal S1, S2; no S3 or S4. No murmur, gallop, or rub.  ABDOMEN: Soft, tenderness in the periumbilical area. Bowel sounds normal. No palpable organomegaly or masses.  MUSCULOSKELETAL: Extremities without gross deformities noted. No edema of bilateral lower extremities.  SKIN: No suspicious lesions or rashes.  NEURO: Alert and oriented x 3. Cranial nerves II-XII grossly intact.  PSYCHIATRIC: Mentation and affect appear normal.    Laboratory/Imaging Studies:  Oncology Visit on 08/01/2018   Component Date Value Ref Range Status     CRP Inflammation 08/01/2018 5.4  0.0 - 8.0 mg/L Final     Copath Report 08/01/2018    Final                    Value:Patient Name: MAURICIO HWANG  MR#: [id number]  Specimen #: [id number]  Collected: 8/1/2018  Received: 8/2/2018  Reported: 8/2/2018 15:45  Ordering Phy(s): PUMA VIRGEN    For improved result formatting, select 'View Enhanced Report Format' under   Linked Documents section.    TEST(S):  Peripheral Smear Morphology    FINAL DIAGNOSIS:  Peripheral Smear Morphology:  - Mild to  moderate leukocytosis with absolute neutrophilia with minimal   left shift (rare circulating myelocyte  and metamyelocyte on scan); no blasts or dysplasia seen  - Borderline lymphocytosis without atypia.    COMMENT:  The patient has a history of neutrophilia since 2015 and is a former   smoker.  The neutrophilia may be  reactive, likely related to an infectious or inflammatory reaction or mild   physiologic stress. If the patient  is a smoker it should also be noted that patient's who smoke can have   persisting leukocytosis even after  cessation of smoking (Reference: Smoking-induced leukocytosis can persist                             after cessation of smoking.  Arch  Med Res. 2004  May-Jun; 35(3): 246-50).  Next generation sequencing is   presently pending for the  myeloproliferative neoplasm panel.    Electronically signed out by:    KENNEDY Nobles M.D.    CLINICAL HISTORY:  33-year-old female.    PERIPHERAL BLOOD DATA:  PERIPHERAL BLOOD DATA (Date: 8/1/18)  Patient Value (Reference Range >18 year old female)  18.95    WBC        (4.0-11.0 x 10*9/L)  3.85    RBC         (3.8-5.2 x 10*12/L)  12.0    HGB         (11.7-15.7 g/dL)  37.7    HCT         (35.0-47.0 %)  97.9    MCV        (78-100fL)  31.2     MCH        (26.5-33.0 pg)  31.8     MCHC     (31.5-36.5 g/dL)  13.6     RDW       (10.0-15.0 %)  322     PLT         (150-450 x 10*9/L)    PERIPHERAL BLOOD DIFFERENTIAL - Manual 200 cells.  (Reference ranges >18 year old)    Percent  61.0%  Neutrophils  29.5%  Lymphocytes  7.0%   Monocytes  2.0%   Eosinophils  0.5%   Basophils    Absolute  11.56  Neutrophils  (Ref normal 1.6 - 8.3 x 10*9/L)  5.59   Lymphocytes  (                          Ref normal 0.8 - 5.3 x 10*9/L)  1.33   Monocytes  (Ref normal 0 -1.3 x 10*9/L)  0.38   Eosinophils  (Ref normal 0 - 0.7 x 10*9/L)  0.09   Basophils  (Ref normal 0 - 0.2 x 10*9/L)    PERIPHERAL BLOOD MORPHOLOGY:    ERYTHROCYTES:  The red cells are normocytic, normochromic and  normal in   number for the patient's age and  gender. No significant anisopoikilocytosis is seen. No morphologic   features of hemolysis or increased  polychromasia are identified.  No parasites are identified.    LEUKOCYTES:  The leukocytes are morphologically normal and moderately   increased in number with a mature  neutrophilia and borderline lymphocytosis.  Rare neutrophil myelocytes and   metamyelocytes are seen on scan.  No blasts or evidence of dysplasia is seen. No atypical lymphoid cells are   seen. No parasites or parasitic  inclusions are seen.    PLATELETS:  The platelets are normal in number and morphology. (Dictated   by: MARINA Nobles MD 08/02/2018)    CPT Codes:  A: [address]-GFHY    TESTING LAB LOC                          ATION:  [facility], [address] [address] [phone]    COLLECTION SITE:  Client:  [facility]  Location:  [facility] ()       WBC 08/01/2018 19.0* 4.0 - 11.0 10e9/L Final     RBC Count 08/01/2018 3.85  3.8 - 5.2 10e12/L Final     Hemoglobin 08/01/2018 12.0  11.7 - 15.7 g/dL Final     Hematocrit 08/01/2018 37.7  35.0 - 47.0 % Final     MCV 08/01/2018 98  78 - 100 fl Final     MCH 08/01/2018 31.2  26.5 - 33.0 pg Final     MCHC 08/01/2018 31.8  31.5 - 36.5 g/dL Final     RDW 08/01/2018 13.6  10.0 - 15.0 % Final     Platelet Count 08/01/2018 322  150 - 450 10e9/L Final     Diff Method 08/01/2018 Slide to be reviewed by Pathologist   Final     % Retic 08/01/2018 2.0  0.5 - 2.0 % Final     Absolute Retic 08/01/2018 75.5  25 - 95 10e9/L Final     Sed Rate 08/01/2018 24* 0 - 20 mm/h Final     Copath Report 08/01/2018    Final                    Value:Patient Name: MAURICIO HWANG  MR#: [id number]  Specimen #: [id number]  Collected: 8/1/2018 13:58  Received: 8/2/2018 09:27  Reported: 8/10/2018 12:29  Ordering Phy(s): PUMA VIRGEN  Additional Phy(s): HETAL ASENCIO  FORMHeptares TherapeuticsSAURAV    For improved result formatting, select 'View Enhanced Report Format' under   Linked Documents section.  _________________________________________    TEST(S) REQUESTED:  Next Generation Sequencing Oncology MPN    SPECIMEN DESCRIPTION:  Blood    SIGNIFICANT RESULTS    ---------------------------------------------------------------------  Detected Alterations of Known or Potential Pathogenicity: None    Detected Alterations of Uncertain Significance: None    Genes with No Detected Alterations of the Amino Acid Sequence: CALR, JAK2,   MPL  ---------------------------------------------------------------------     INTERPRETATION OF RESULTS     ---------------------------------------------------------------------   No mutations were identifie                          d in the analyzed regions of CALR, JAK2, KIT   and MPL genes.     Nearly 100% of polycythemia vera (PV) cases have either JAK2 V617F or an   exon 12 mutation. As this patient's  sample is negative for mutations in these regions, PV is unlikely. JAK2,   CALR, or MPL mutations have also been  reported in both essential thrombocythemia (ET) and primary myelofibrosis   (PMF) at variable prevalence [1-3].  However, the absence of mutations does not exclude these diagnoses as   approximately 10-15% of ET or PMF may be  negative for mutations in these 3 genes.     The patient has neutrophilia, if there is a consideration for a less   common myeloid neoplasm, ordering an  expanded MPN panel may be helpful. This could be performed on the specimen   in the laboratory. If desired,  please call the lab at [phone] to coordinate.     Correlation with clinical information, morphology, and other diagnostic   tests is indicated.     References:    1. Stacie SH, Renato E, Gurinder HENSON, et al. WHO Cla                          ssification of Tumours   of Haematopoietic and Lymphoid  Tissues (IARC WHO Classification of Tumours) Revised Edition, IARC Press;    2017.    2. Omar AD, Da RL, Heather T, et al.  mutations in   myeloproliferative and other myeloid  disorders: a study of 1182 patients. Blood. 2006;108(10):3472-6.    3. Jacob T, Sayda H, Adi AS, et al. Somatic mutations of   calreticulin in myeloproliferative  neoplasms. N Engl J Med. 2013;369(25):2379-90.    4. Robert A, Karl J, Cayden R. Mastocytosis: a mutated KIT receptor   induced myeloproliferative disorder.  Oncotarget. 2015 Jul 30;6(21):82088-62. Review. PubMed PMID: [id number];   PubMed Central PMCID: [id number].     ---------------------------------------------------------------------    TEST DETAILS    ---------------------------------------------------------------------  Methodology  ---------------------------------------------------------------------  Genomic DNA is extracted from the sample and sequencing li                          braries are   prepared using an amplicon-based target  enrichment method using a siXis systems or a custom   developed low-input PCR method. The  enriched DNA libraries are sequenced on an Illumina MiSeq instrument, and   FASTQ files are processed through a  custom designed bioinformatics pipeline [based on methods described in   Paul et al. Genome Med. 2015 doi:  10.1186/j43487-951-9088-0 and Lee et al. Phyllis. Transl. Med. 2018 doi:   10.13495/tiffany.2018.05.07]. Amplicons  with less than 500X minimum coverage are flagged for limited analytic   performance. Variant call files (vcf)  are filtered to remove calls with variant allele fractions (VAF) less than   thresholds defined for single  nucleotide variants (5-10%) and insertion/deletion variants (1-5%).   Clinically relevant mutations from this  filtered variant list are annotated by a pathologist with [employer]   software and reported. The assay bed  file and 5% SNV hotspot list are available upon request.                           The analytic   accuracy of  the assay is 99%. Sequenced  regions of the clinically ordered gene set are identified in the table   below.    Gene(NM Reference): Exons covered  CALR (NM_004343.3): 9; JAK2 (NM_004972.3): 12-14,16,20; MPL (NM_005373.2):   10    Less than 500X coverage was achieved in portions of the following exons;   additional mutations in these regions  cannot be entirely excluded: None    ---------------------------------------------------------------------  Limitations  ---------------------------------------------------------------------  This amplicon-based NGS assay is designed to detect recurring, clinically   relevant mutations in selected exons  of the tested genes. Sequence variants occurring within primer binding   sequences may cause allele dropout and  not be detected. The validated allele frequency thresholds for reporting   positive results range from 1% to 10%  dependent on the type of mutation as follows: 5% for single nucleotide   variants within defined                           hotspots (list  available upon request); 10% for all other single nucleotide variants; 1%   for insertion-deletion mutations  greater than 3bp, and 5% for insertion-deletion mutations 3bp or smaller.   Therefore, the tumor cell population  must comprise at least 10-20% of the submitted specimen. Specimens with   borderline tumor cellularity may lead  to false negative results. Caution is advised for the interpretation of   negative results, and correlation with  morphology and other laboratory results is recommended to ensure adequate   representation of the cell  population of interest. This assay is not designed to detect large-scale   genomic rearrangement, gene deletion,  or gene amplification. Such somatic alterations may be relevant and   correlation with known genomic  characteristics for this patient's specific tumor is recommended. Benign   or likely benign variants (including  germline polymorphisms) are not  reported.  --------------------------------------------------------                          -------------  Disclaimer  ---------------------------------------------------------------------  This test was developed and its performance characteristics determined by   the [facility], [facility]. It has not been cleared or   approved by the FDA. The laboratory is  regulated under CLIA as qualified to perform high-complexity testing. This   test is used for clinical purposes.  It should not be regarded as investigational or for research.    A resident/fellow in an accredited training program was involved in the   selection of testing, review of  laboratory data, and/or interpretation of this case. I, as the senior   physician, attest that I: (i) confirmed  appropriate testing, (ii) examined the relevant raw data for the   specimen(s); and (iii) rendered or confirmed  the interpretation(s).    ---------------------------------------------------------------------  [employer] Disclaimer  -------------------------------------------                          --------------------------  This report was produced using software licensed by [employer].   [employer] software is designed to be  used in clinical applications solely as a tool to enhance medical utility   and improve operational efficiency.  The use of [employer] software is not a substitute for medical judgment   and [employer] in no way holds  itself out as having or providing independent medical judgment or   diagnostic services. [facility] is not  liable with respect to any treatment or diagnosis made in connection with   this report.    GenomOncology Rules Version: [id number]  GenomOncology Application Version: [id number]    ---------------------------------------------------------------------  Electronic  Signature  ---------------------------------------------------------------------  Electronically signed by: Melina Ziegler MD  08/10/2018    Electronically Signed Out By:  Melina Ziegler M.D. [facility]    CPT Codes:  A: -OENAKR, NAIMA                          2MPNPNGSOTC, MPLMPNPNGSOTC, CALCCALRPNGSTC2    TESTING LAB LOCATION:  [address] [place] [address] [phone]    COLLECTION SITE:  Client:  [facility]  Location:  [facility] (K)          No results found for this or any previous visit (from the past 744 hour(s)).    Assessment and plan:  (C25.9) Malignant neoplasm of pancreas, unspecified location of malignancy (H)    Clinical evidence of disease recurrence.    Leukocytosis  I reviewed with the patient today causes of leukocytosis.  There is no evidence of chronic leukemia or myeloproliferative disorder.  The patient leukocytosis probably related to reactive phenomenon secondary to smoking, infection, inflammation, recurrent malignancy.  I will him going to arrange for repeating CT scan of the abdomen follow-up for pancreatic malignancy.  (K21.9) Gastroesophageal reflux disease without esophagitis  The patient currently on prednisone 20 mg orally daily and ranitidine 300 mg daily.    (E78.5) Hyperlipidemia LDL goal <100  Patient currently on Zocor 20 mg orally daily.    (N18.3) CKD (chronic kidney disease) stage 3, GFR 30-59 ml/min  Creatinine has been stable    (I10) Essential hypertension  Currently on Norvasc 5 mg orally daily.  chlorthalidone 25 mg orally daily.  Lisinopril 5 mg orally daily.    (F31.9) Bipolar affective disorder, remission status unspecified (H)  Patient currently on Prozac 20 mg orally daily.    (Z72.0) Nicotine abuse  I strongly emphasized the importance of quitting smoking.      The patient is ready to learn, no apparent learning barriers were identified.  Diagnosis and treatment plans  were explained to the patient. The patient expressed understanding of the content. The patient asked appropriate questions. The patient questions were answered to her satisfaction.    Chart documentation with Dragon Voice recognition Software. Although reviewed after completion, some words and grammatical errors may remain.    Again, thank you for allowing me to participate in the care of your patient.        Sincerely,        Meghan Rosenthal MD

## 2018-08-14 NOTE — PROGRESS NOTES
Message left for patient to return call to clinic in regards to results and recommendations.  (Pt does not have f/u scheduled, she is a PRN f/u). Direct line provided.

## 2018-08-14 NOTE — PROGRESS NOTES
Patient returned call to clinic. Reviewed results and recommendations. Denies questions or concerns at this time, will call back if any arise.

## 2018-08-15 ENCOUNTER — VIRTUAL VISIT (OUTPATIENT)
Dept: NURSING | Facility: CLINIC | Age: 33
End: 2018-08-15

## 2018-08-15 DIAGNOSIS — Z79.4 TYPE 2 DIABETES MELLITUS WITH STAGE 3 CHRONIC KIDNEY DISEASE, WITH LONG-TERM CURRENT USE OF INSULIN (H): Primary | ICD-10-CM

## 2018-08-15 DIAGNOSIS — E11.22 TYPE 2 DIABETES MELLITUS WITH STAGE 3 CHRONIC KIDNEY DISEASE, WITH LONG-TERM CURRENT USE OF INSULIN (H): Primary | ICD-10-CM

## 2018-08-15 DIAGNOSIS — N18.30 TYPE 2 DIABETES MELLITUS WITH STAGE 3 CHRONIC KIDNEY DISEASE, WITH LONG-TERM CURRENT USE OF INSULIN (H): Primary | ICD-10-CM

## 2018-08-15 PROCEDURE — 99207 ZZC HEALTH COACHING, NO CHARGE: CPT

## 2018-08-15 NOTE — PROGRESS NOTES
August 15, 2018    [facility]  [address]  [place] [address]  [phone]  Health Coaching Progress Note    Patient Name: Divina Delgadillo Date: August 15, 2018      Session Length: 30      DATA    PRM Master Survey Scores Reviewed: Yes    Core Healthy Days Survey:         EMY Score (Last Two) 5/10/2018   EMY Raw Score 35   Activation Score 72.1   EMY Level 3       PHQ-2 Score 6/14/2018 5/10/2018   PHQ-2 Total Score Interpretation - Positive if 3 or more points; Administer PHQ-9 if positive 0 1       PHQ-9 SCORE 12/7/2017 5/23/2018   Total Score 12 2       Treatment Objective(s) Addressed in This Session:  Target Behavior(s): diet/weight loss and disease management/lifestyle changes increasing exercise    Current Stressors / Issues:  Divina shares today that her feet have really been bothering her lately and thinks this is due to diabetic nerve pain and wonders what else she can do to help with this. We discuss following up further on this today during our visit.    What Patient Does Well:   Divina has been doing really well with exercise and has been walking daily with her housemates and says this feels great (except her feet).  Previous Successes:   Divina reports today that she went on a new mood medication a while back and feels I is really helping her now and is very happy to report that she hasn't cut herself for over 2 months now and feels happy about this and that she is taking things better (not so personally).  Areas in Need of Improvement:   See above. We discuss possibly following up with podiatry or CDE regarding diabetic nerve pain to learn more about how this may be able to be managed better so that Divina may have relief of her foot pain. Writer will also look into complementary therapies for this and will up on next visit.   Barriers to Change:   Divina notes no additional barriers at this time.  Reasons for Change:   Divina wants to  make changes for her health and self-esteem.  Plan/Goal for the Next 4 Weeks:     GOAL #1: Continue doing something active or exercising most days-going really well -target goal of 3x/week now  GOAL #1 Progress Toward Goal: 50%  GOAL #2: Continue increasing healthy eating habits-increase days eating breakfast, increase vegetables, decrease portions, monitor carbs  GOAL #2 Progress Toward Goal: 75%    Intervention:  Motivational Interviewing    MI Intervention: Expressed Empathy/Understanding, Supported Autonomy, Collaboration, Evocation, Permission to raise concern or advise, Open-ended questions, Reflections: simple and complex, Change talk (evoked) and Reframe     Change Talk Expressed by the Patient: Desire to change Ability to change Reasons to change Committment to change Activation Taking steps    Provider Response to Change Talk: E - Evoked more info from patient about behavior change, A - Affirmed patient's thoughts, decisions, or attempts at behavior change, R - Reflected patient's change talk and S - Summarized patient's change talk statements    Assessment / Progress on Treatment Objective(s) / Homework:    Satisfactory progress - ACTION (Actively working towards change); Intervened by reinforcing change plan / affirming steps taken         Plan: (Homework, other):  Patient was encouraged to continue to seek condition-related information and education, as well as schedule a follow up appointment with the Health  in 4 weeks. Patient has set self-identified goals and will monitor progress until the next appointment.  Next visit scheduled for September 12 at 3:30PM.      Mahin Penny

## 2018-08-15 NOTE — MR AVS SNAPSHOT
After Visit Summary   8/15/2018    Divina Delgadillo    MRN: [id number]           Patient Information     Date Of Birth          1985        Visit Information        Provider Department      8/15/2018 3:30 PM Mahin Penny        Today's Diagnoses     Type 2 diabetes mellitus with stage 3 chronic kidney disease, with long-term current use of insulin (H)    -  1       Follow-ups after your visit        Follow-up notes from your care team     Return in 4 weeks (on 9/12/2018).      Your next 10 appointments already scheduled     Aug 27, 2018  3:00 PM CDT   Diabetic Education with [place] [facility]   [place] [facility] [place] ([facility])    [address] [facility] [address]   [phone]            Jan 14, 2019 10:00 AM CST   LAB with LAB FIRST FLOOR [facility] ([facility])    [address] [place] [address] [phone]           Please do not eat 10-12 hours before your appointment if you are coming in fasting for labs on lipids, cholesterol, or glucose (sugar). This does not apply to pregnant women. Water, hot tea and black coffee (with nothing added) are okay. Do not drink other fluids, diet soda or chew gum.            Jan 14, 2019 10:30 AM CST   Return Visit with Sánchez Dias MD   [facility] ([facility])    [address] [place] [address] [phone]              Who to contact     If you have questions or need follow up information about today's clinic visit or your schedule please contact [place] [facility] directly at [phone].  Normal or non-critical lab and imaging results will be communicated to you by MyChart, letter or phone within 4 business days after the clinic has received the results. If you do not hear from us within 7 days, please contact the clinic through MyChart or phone. If you have a  critical or abnormal lab result, we will notify you by phone as soon as possible.  Submit refill requests through [employer] or call your pharmacy and they will forward the refill request to us. Please allow 3 business days for your refill to be completed.          Additional Information About Your Visit        Care EveryWhere ID     This is your Care EveryWhere ID. This could be used by other organizations to access your [place] medical records  [id number]         Blood Pressure from Last 3 Encounters:   08/13/18 142/57   08/01/18 137/45   07/10/18 129/74    Weight from Last 3 Encounters:   08/13/18 192 lb (87.1 kg)   08/01/18 195 lb 9.6 oz (88.7 kg)   07/10/18 192 lb (87.1 kg)              Today, you had the following     No orders found for display       Primary Care Provider Office Phone # Fax #    Jaleesa Watson Herreraecho APRALICIA [phone] [address] [address]        Equal Access to Services     FANG HAY : Hadii aad ku hadasho Soomaali, waaxda luqadaha, qaybta kaalmada adeegyada, waxay idiin haytello echeverria . So [facility] [phone].    ATENCIÓN: Si habla español, tiene a igl disposición servicios gratuitos de asistencia lingüística. [facility] [phone].    We comply with applicable federal civil rights laws and [place] laws. We do not discriminate on the basis of race, color, national origin, age, disability, sex, sexual orientation, or gender identity.            Thank you!     Thank you for choosing [facility]  for your care. Our goal is always to provide you with excellent care. Hearing back from our patients is one way we can continue to improve our services. Please take a few minutes to complete the written survey that you may receive in the mail after your visit with us. Thank you!             Your Updated Medication List - Protect others around you: Learn how to safely use, store and throw away your medicines at www.disposemymeds.org.           This list is accurate as of 8/15/18 11:59 PM.  Always use your most recent med list.                   Brand Name Dispense Instructions for use Diagnosis    ABILIFY 30 MG tablet   Generic drug:  ARIPiprazole      Take 30 mg by mouth daily        albuterol 108 (90 Base) MCG/ACT inhaler    PROAIR HFA/PROVENTIL HFA/VENTOLIN HFA    1 Inhaler    Inhale 2 puffs into the lungs every 6 hours as needed for shortness of breath / dyspnea or wheezing    Mild intermittent asthma with acute exacerbation       amLODIPine 5 MG tablet    NORVASC    90 tablet    Take 1 tablet (5 mg) by mouth daily    Essential hypertension       blood glucose monitoring lancets     100 each    Use to test blood sugar 3 times daily    Type 2 diabetes mellitus with stage 3 chronic kidney disease, with long-term current use of insulin (H)       blood glucose monitoring test strip    no brand specified    100 each    1 strip by In Vitro route 3 times daily    Type 2 diabetes mellitus with stage 3 chronic kidney disease, with long-term current use of insulin (H)       chlorthalidone 25 MG tablet    HYGROTON     Take 12.5 mg by mouth        Clozapine 200 MG tablet    CLOZARIL     Take 200 mg by mouth 2 times daily        continuous blood glucose monitoring sensor     3 each    For use with Freestyle Yash Flash  for continuous monitioring of blood glucose levels. Replace sensor every 10 days.    Type 2 diabetes mellitus with stage 3 chronic kidney disease, with long-term current use of insulin (H)       dulaglutide 1.5 MG/0.5ML pen    TRULICITY    6 mL    Inject 1.5 mg Subcutaneous every 7 days    Type 2 diabetes mellitus with stage 3 chronic kidney disease, without long-term current use of insulin (H)       FLUoxetine 20 MG capsule    PROzac     Take 20 mg by mouth daily        FREESTYLE YASH READER Nakia     1 Device    1 Device 3 times daily as needed    Type 2 diabetes mellitus with stage 3 chronic kidney disease, with long-term current  use of insulin (H)       insulin glargine 100 UNIT/ML injection    LANTUS SOLOSTAR    15 mL    Inject 19 units daily    Type 2 diabetes mellitus with stage 3 chronic kidney disease, with long-term current use of insulin (H)       lamoTRIgine 25 MG tablet    LaMICtal     Take 1 tablet by mouth at bedtime x2 weeks, then 2 tablets at bedtime thereafter        lisinopril 5 MG tablet    PRINIVIL/ZESTRIL    90 tablet    Take 1 tablet (5 mg) by mouth daily    Essential hypertension       loratadine 10 MG tablet    CLARITIN    90 tablet    Take 1 tablet (10 mg) by mouth every morning    Chronic seasonal allergic rhinitis, unspecified trigger       montelukast 10 MG tablet    SINGULAIR    90 tablet    Take 1 tablet (10 mg) by mouth At Bedtime    Mild intermittent asthma with acute exacerbation       omeprazole 20 MG CR capsule    priLOSEC    60 capsule    Take 1 capsule (20 mg) by mouth daily 1 capsule bid    Gastroesophageal reflux disease without esophagitis       ranitidine 300 MG tablet    ZANTAC    90 tablet    Take 1 tablet (300 mg) by mouth At Bedtime    Gastroesophageal reflux disease without esophagitis       simvastatin 20 MG tablet    ZOCOR     Take 20 mg by mouth        Sod Fluoride-Potassium Nitrate 1.1-5 % Pste           ULTICARE MINI 31G X 6 MM   Generic drug:  insulin pen needle      AS DIRECTED WITH LANTUS PENS        ULTILET SHARPS CONTAINER 1QT Misc     1 each    1 Device as needed    Type 2 diabetes mellitus with stage 3 chronic kidney disease, without long-term current use of insulin (H)

## 2018-08-27 ENCOUNTER — ALLIED HEALTH/NURSE VISIT (OUTPATIENT)
Dept: EDUCATION SERVICES | Facility: CLINIC | Age: 33
End: 2018-08-27
Payer: MEDICARE

## 2018-08-27 DIAGNOSIS — F25.0 SCHIZOAFFECTIVE DISORDER, BIPOLAR TYPE (H): ICD-10-CM

## 2018-08-27 DIAGNOSIS — E11.22 TYPE 2 DIABETES MELLITUS WITH STAGE 3 CHRONIC KIDNEY DISEASE, WITH LONG-TERM CURRENT USE OF INSULIN (H): Primary | ICD-10-CM

## 2018-08-27 DIAGNOSIS — Z79.899 MEDICATION MANAGEMENT: ICD-10-CM

## 2018-08-27 DIAGNOSIS — Z79.4 TYPE 2 DIABETES MELLITUS WITH STAGE 3 CHRONIC KIDNEY DISEASE, WITH LONG-TERM CURRENT USE OF INSULIN (H): Primary | ICD-10-CM

## 2018-08-27 DIAGNOSIS — N18.30 TYPE 2 DIABETES MELLITUS WITH STAGE 3 CHRONIC KIDNEY DISEASE, WITH LONG-TERM CURRENT USE OF INSULIN (H): Primary | ICD-10-CM

## 2018-08-27 LAB
BASOPHILS # BLD AUTO: 0.1 10E9/L (ref 0–0.2)
BASOPHILS NFR BLD AUTO: 0.5 %
DIFFERENTIAL METHOD BLD: ABNORMAL
EOSINOPHIL # BLD AUTO: 0.2 10E9/L (ref 0–0.7)
EOSINOPHIL NFR BLD AUTO: 0.7 %
ERYTHROCYTE [DISTWIDTH] IN BLOOD BY AUTOMATED COUNT: 13.9 % (ref 10–15)
HCT VFR BLD AUTO: 40.4 % (ref 35–47)
HGB BLD-MCNC: 12.7 G/DL (ref 11.7–15.7)
IMM GRANULOCYTES # BLD: 0.3 10E9/L (ref 0–0.4)
IMM GRANULOCYTES NFR BLD: 1.2 %
LYMPHOCYTES # BLD AUTO: 6.1 10E9/L (ref 0.8–5.3)
LYMPHOCYTES NFR BLD AUTO: 28.3 %
MCH RBC QN AUTO: 30.9 PG (ref 26.5–33)
MCHC RBC AUTO-ENTMCNC: 31.4 G/DL (ref 31.5–36.5)
MCV RBC AUTO: 98 FL (ref 78–100)
MONOCYTES # BLD AUTO: 1.5 10E9/L (ref 0–1.3)
MONOCYTES NFR BLD AUTO: 6.8 %
NEUTROPHILS # BLD AUTO: 13.5 10E9/L (ref 1.6–8.3)
NEUTROPHILS NFR BLD AUTO: 62.5 %
NRBC # BLD AUTO: 0 10*3/UL
NRBC BLD AUTO-RTO: 0 /100
PLATELET # BLD AUTO: 356 10E9/L (ref 150–450)
PLATELET # BLD EST: ABNORMAL 10*3/UL
RBC # BLD AUTO: 4.11 10E12/L (ref 3.8–5.2)
RBC MORPH BLD: NORMAL
WBC # BLD AUTO: 21.5 10E9/L (ref 4–11)

## 2018-08-27 PROCEDURE — G0108 DIAB MANAGE TRN  PER INDIV: HCPCS

## 2018-08-27 PROCEDURE — 36415 COLL VENOUS BLD VENIPUNCTURE: CPT | Performed by: CLINICAL NURSE SPECIALIST

## 2018-08-27 PROCEDURE — 85025 COMPLETE CBC W/AUTO DIFF WBC: CPT | Performed by: CLINICAL NURSE SPECIALIST

## 2018-08-27 NOTE — PROGRESS NOTES
"Diabetes Self-Management Education & Support    Diabetes Education Self Management & Training    SUBJECTIVE/OBJECTIVE:  Presents for: Follow-up  Accompanied by: Self  Diabetes education in the past 24mo: Yes  Focus of Visit: Healthy Eating  Diabetes type: Type 2  Disease course: Stable  Cultural Influences/Ethnic Background:  American    Diabetes Symptoms & Complications  Not assessed at this visit   Patient Problem List and Family Medical History reviewed for relevant medical history, current medical status, and diabetes risk factors.    Vitals:  Wt Readings from Last 5 Encounters:   08/13/18 87.1 kg (192 lb)   08/01/18 88.7 kg (195 lb 9.6 oz)   07/10/18 87.1 kg (192 lb)   06/28/18 87.4 kg (192 lb 11.2 oz)   06/14/18 85.5 kg (188 lb 9.6 oz)     Estimated body mass index is 32.94 kg/(m^2) as calculated from the following:    Height as of 8/13/18: 1.626 m (5' 4.02\").    Weight as of 8/13/18: 87.1 kg (192 lb).   Last 3 BP:   BP Readings from Last 3 Encounters:   08/13/18 142/57   08/01/18 137/45   07/10/18 129/74       History   Smoking Status     Former Smoker     Packs/day: 1.00     Years: 4.00     Types: Cigarettes   Smokeless Tobacco     Never Used     Comment: 15 cig/day.  I strongly emphasized the importance of quitting smoking.       Labs:  Lab Results   Component Value Date    A1C 7.2 06/21/2018     Lab Results   Component Value Date    GLC 87 07/25/2018     Lab Results   Component Value Date     06/21/2018     HDL Cholesterol   Date Value Ref Range Status   06/21/2018 57 >49 mg/dL Final   ]  GFR Estimate   Date Value Ref Range Status   07/25/2018 36 (L) >60 mL/min/1.7m2 Final     Comment:     Non  GFR Calc     GFR Estimate If Black   Date Value Ref Range Status   07/25/2018 44 (L) >60 mL/min/1.7m2 Final     Comment:      GFR Calc     Lab Results   Component Value Date    CR 1.65 07/25/2018     No results found for: MICROALBUMIN    Healthy Eating  Patient on a regular " basis: Eats 3 meals a day (breakfast: cereal OR banana.  Lunch: ramen noodles OR hot cereal OR peanutbutter sandwich.   snacks: - vending machine at work )  Meal planning: Carbohydrate counting, Smaller portions  Meals include: Breakfast, Lunch, Dinner, Snacks    Being Active   has not been lately.  Not going to gym or walking.    Discussed adding 5-10 minute activities, swHacking the [employer] kwesi    Monitoring  Did patient bring glucose meter to appointment? : No  Home Glucose (Sugar) Monitoring: 3-4 times per day  Overall Range (mg/dL): 130-140  5/30 above 180mg/dL post prandial  dinner over the last month   4/30 above 180mg/dL post prandial lunch over the last month     Taking Medications  Diabetes Medication(s)     Insulin Sig    insulin glargine (LANTUS SOLOSTAR) 100 UNIT/ML pen Inject 19 units daily    Incretin Mimetic Agents (GLP-1 Receptor Agonists) Sig    dulaglutide (TRULICITY) 1.5 MG/0.5ML pen Inject 1.5 mg Subcutaneous every 7 days        Current Treatments: Insulin Injections, Oral Agent (monotherapy)    Problem Solving  Hypoglycemia Frequency: Rarely (Reports some numbers in the 70s)    Hypoglycemia Symptoms   shaky when getting in the 70s-80s     Hypoglycemia Complications   Not assessed at this visit     Reducing Risks   Not assessed at this visit     Healthy Coping   Has support from staff   Monthy check insulin with Diabetes ed and health  visits     Patient Activation Measure Survey Score:  EMY Score (Last Two) 5/10/2018   EMY Raw Score 35   Activation Score 72.1   EMY Level 3       Patient's most recent   Lab Results   Component Value Date    A1C 7.2 06/21/2018    is not meeting goal of <7.0    INTERVENTION:   Called [facility] and the FreeStyle Yash was not covered under [facility] as medicare appears to be primary.  Patient unsure if she has part D and does not qualify for it to run through part B.  Can instead do a professional continuous glucose monitor quarterly per Medicare.  Reviewed SMBG and adding  in pre dinner checks occasionally, staff will help fill out sheet to remind patient to alternate between pre and post checks. Reviewed diet and exercise goals.  Blood sugars higher this past week as she bought toaster strudels; will switch to something else.     Diabetes knowledge and skills assessment:     Patient is knowledgeable in diabetes management concepts related to: Healthy Eating, Being Active, Monitoring, Taking Medication, Problem Solving, Reducing Risks and Healthy Coping    Patient needs further education on the following diabetes management concepts: Healthy Eating, Being Active, Monitoring and Reducing Risks    Based on learning assessment above, most appropriate setting for further diabetes education would be: Individual setting.    Education provided today on:  AADE Self-Care Behaviors:  Healthy Eating: consistency in amount, composition, and timing of food intake and portion control  Being Active: relationship to blood glucose  Monitoring: log and interpret results and frequency of monitoring    Opportunities for ongoing education and support in diabetes-self management were discussed.  Pt verbalized understanding of concepts discussed and recommendations provided today.       Education Materials Provided:  BG Log Sheet    PLAN:  See Patient Instructions for co-developed, patient-stated behavior change goals.  AVS printed and provided to patient today. See Follow-Up section for recommended follow-up.      Dolly Riley RD, LD, CDE  Diabetes Education    Time Spent: 45 minutes  Encounter Type: Individual

## 2018-08-27 NOTE — MR AVS SNAPSHOT
After Visit Summary   8/27/2018    Divina Delgadillo    MRN: [id number]           Patient Information     Date Of Birth          1985        Visit Information        Provider Department      8/27/2018 3:00 PM [place] [facility] [place] [facility] [place]        Care Instructions    Protein at breakfast  -  Peanut butter in oatmeal  -  cottage cheese and peaches     Myfitnesspal tracking kwesi     SwMoveEZ kwesi - aim for 5-10 minutes a few times a week     Dolly Riley RD, LD, CDE  For Diabetes Education appointments call: [phone]   For Diabetes Education Related Questions call: [phone] or email: [email or web address]                Follow-ups after your visit        Your next 10 appointments already scheduled     Aug 27, 2018  4:10 PM CDT   LAB with [facility] ([facility])    [address] [facility] [address] [phone]           Please do not eat 10-12 hours before your appointment if you are coming in fasting for labs on lipids, cholesterol, or glucose (sugar). This does not apply to pregnant women. Water, hot tea and black coffee (with nothing added) are okay. Do not drink other fluids, diet soda or chew gum.            Sep 12, 2018  3:30 PM CDT   Telephone Visit with Mahin Penny   [facility]    [address] [place] [address] [phone]           Note: this is not an onsite visit; there is no need to come to the facility.            Oct 02, 2018  3:00 PM CDT   Diabetes Education with [place] [facility]   [facility] ([facility])    [address] [facility] [address]   [phone]            Jan 14, 2019 10:00 AM CST   LAB with LAB FIRST FLOOR [facility] ([facility])    [address] [place] [phone]           Please do not eat 10-12  hours before your appointment if you are coming in fasting for labs on lipids, cholesterol, or glucose (sugar). This does not apply to pregnant women. Water, hot tea and black coffee (with nothing added) are okay. Do not drink other fluids, diet soda or chew gum.            Jan 14, 2019 10:30 AM CST   Return Visit with Sánchez Dias MD   [facility] ([facility])    [address] [place] [id number] [phone]              Who to contact     If you have questions or need follow up information about today's clinic visit or your schedule please contact [place] [facility] [place] directly at [phone].  Normal or non-critical lab and imaging results will be communicated to you by MyChart, letter or phone within 4 business days after the clinic has received the results. If you do not hear from us within 7 days, please contact the clinic through MyChart or phone. If you have a critical or abnormal lab result, we will notify you by phone as soon as possible.  Submit refill requests through [employer] or call your pharmacy and they will forward the refill request to us. Please allow 3 business days for your refill to be completed.          Additional Information About Your Visit        Care EveryWhere ID     This is your Care EveryWhere ID. This could be used by other organizations to access your [place] medical records  [id number]         Blood Pressure from Last 3 Encounters:   08/13/18 142/57   08/01/18 137/45   07/10/18 129/74    Weight from Last 3 Encounters:   08/13/18 87.1 kg (192 lb)   08/01/18 88.7 kg (195 lb 9.6 oz)   07/10/18 87.1 kg (192 lb)              Today, you had the following     No orders found for display       Primary Care Provider Office Phone # Fax #    VERONIQUE Bledsoe [phone] [address] [facility] [address]        Goals        General    Problem Solving (pt-stated)     Notes - Note  created  8/27/2018  3:52 PM by Dolly Riley RD    Goal Statement: add in pre dinner blood sugar check M-W-F  Measure of Success: review of blood sugar logs in 1 month  Supportive Steps to Achieve:  Highlight days to check before dinner   Barriers: remembering  Strengths: You already are checking your blood sugar very well   Date to Achieve By: 1 month, next visit   Patient expressed understanding of goal: yes            Equal Access to Services     FANG HAY : Hadii aad ku hadasho Soomaali, waaxda luqadaha, qaybta kaalmada adeegyada, aaron dumnot haytello solomon arniebambi echeverria . So [facility] [phone].    ATENCIÓN: Si habla español, tiene a gil disposición servicios gratuitos de asistencia lingüística. Llame al [phone].    We comply with applicable federal civil rights laws and [place] laws. We do not discriminate on the basis of race, color, national origin, age, disability, sex, sexual orientation, or gender identity.            Thank you!     Thank you for choosing [place] [facility]  for your care. Our goal is always to provide you with excellent care. Hearing back from our patients is one way we can continue to improve our services. Please take a few minutes to complete the written survey that you may receive in the mail after your visit with us. Thank you!             Your Updated Medication List - Protect others around you: Learn how to safely use, store and throw away your medicines at www.disposemymeds.org.          This list is accurate as of 8/27/18  3:55 PM.  Always use your most recent med list.                   Brand Name Dispense Instructions for use Diagnosis    ABILIFY 30 MG tablet   Generic drug:  ARIPiprazole      Take 30 mg by mouth daily        albuterol 108 (90 Base) MCG/ACT inhaler    PROAIR HFA/PROVENTIL HFA/VENTOLIN HFA    1 Inhaler    Inhale 2 puffs into the lungs every 6 hours as needed for shortness of breath / dyspnea or wheezing    Mild intermittent asthma  with acute exacerbation       amLODIPine 5 MG tablet    NORVASC    90 tablet    Take 1 tablet (5 mg) by mouth daily    Essential hypertension       blood glucose monitoring lancets     100 each    Use to test blood sugar 3 times daily    Type 2 diabetes mellitus with stage 3 chronic kidney disease, with long-term current use of insulin (H)       blood glucose monitoring test strip    no brand specified    100 each    1 strip by In Vitro route 3 times daily    Type 2 diabetes mellitus with stage 3 chronic kidney disease, with long-term current use of insulin (H)       chlorthalidone 25 MG tablet    HYGROTON     Take 12.5 mg by mouth        Clozapine 200 MG tablet    CLOZARIL     Take 200 mg by mouth 2 times daily        continuous blood glucose monitoring sensor     3 each    For use with Freestyle Yash Flash  for continuous monitioring of blood glucose levels. Replace sensor every 10 days.    Type 2 diabetes mellitus with stage 3 chronic kidney disease, with long-term current use of insulin (H)       dulaglutide 1.5 MG/0.5ML pen    TRULICITY    6 mL    Inject 1.5 mg Subcutaneous every 7 days    Type 2 diabetes mellitus with stage 3 chronic kidney disease, without long-term current use of insulin (H)       FLUoxetine 20 MG capsule    PROzac     Take 20 mg by mouth daily        FREESTYLE YASH READER Nakia     1 Device    1 Device 3 times daily as needed    Type 2 diabetes mellitus with stage 3 chronic kidney disease, with long-term current use of insulin (H)       insulin glargine 100 UNIT/ML injection    LANTUS SOLOSTAR    15 mL    Inject 19 units daily    Type 2 diabetes mellitus with stage 3 chronic kidney disease, with long-term current use of insulin (H)       lamoTRIgine 25 MG tablet    LaMICtal     Take 1 tablet by mouth at bedtime x2 weeks, then 2 tablets at bedtime thereafter        lisinopril 5 MG tablet    PRINIVIL/ZESTRIL    90 tablet    Take 1 tablet (5 mg) by mouth daily    Essential  hypertension       loratadine 10 MG tablet    CLARITIN    90 tablet    Take 1 tablet (10 mg) by mouth every morning    Chronic seasonal allergic rhinitis, unspecified trigger       montelukast 10 MG tablet    SINGULAIR    90 tablet    Take 1 tablet (10 mg) by mouth At Bedtime    Mild intermittent asthma with acute exacerbation       omeprazole 20 MG CR capsule    priLOSEC    60 capsule    Take 1 capsule (20 mg) by mouth daily 1 capsule bid    Gastroesophageal reflux disease without esophagitis       ranitidine 300 MG tablet    ZANTAC    90 tablet    Take 1 tablet (300 mg) by mouth At Bedtime    Gastroesophageal reflux disease without esophagitis       simvastatin 20 MG tablet    ZOCOR     Take 20 mg by mouth        Sod Fluoride-Potassium Nitrate 1.1-5 % Pste           ULTICARE MINI 31G X 6 MM   Generic drug:  insulin pen needle      AS DIRECTED WITH LANTUS PENS        ULTILET SHARPS CONTAINER 1QT Misc     1 each    1 Device as needed    Type 2 diabetes mellitus with stage 3 chronic kidney disease, without long-term current use of insulin (H)

## 2018-08-27 NOTE — PATIENT INSTRUCTIONS
Protein at breakfast  -  Peanut butter in oatmeal  -  cottage cheese and peaches     Myfitnesspal tracking kwesi     SwEssensium kwesi - aim for 5-10 minutes a few times a week     Dolly Riley RD, LD, CDE  For Diabetes Education appointments call: [phone]   For Diabetes Education Related Questions call: [phone] or email: [email or web address]

## 2018-09-12 ENCOUNTER — VIRTUAL VISIT (OUTPATIENT)
Dept: NURSING | Facility: CLINIC | Age: 33
End: 2018-09-12

## 2018-09-12 DIAGNOSIS — E11.22 TYPE 2 DIABETES MELLITUS WITH STAGE 3 CHRONIC KIDNEY DISEASE, WITH LONG-TERM CURRENT USE OF INSULIN (H): Primary | ICD-10-CM

## 2018-09-12 DIAGNOSIS — Z79.4 TYPE 2 DIABETES MELLITUS WITH STAGE 3 CHRONIC KIDNEY DISEASE, WITH LONG-TERM CURRENT USE OF INSULIN (H): Primary | ICD-10-CM

## 2018-09-12 DIAGNOSIS — N18.30 TYPE 2 DIABETES MELLITUS WITH STAGE 3 CHRONIC KIDNEY DISEASE, WITH LONG-TERM CURRENT USE OF INSULIN (H): Primary | ICD-10-CM

## 2018-09-12 PROCEDURE — 99207 ZZC HEALTH COACHING, NO CHARGE: CPT

## 2018-09-12 NOTE — MR AVS SNAPSHOT
After Visit Summary   9/12/2018    Divina Delgadillo    MRN: [id number]           Patient Information     Date Of Birth          1985        Visit Information        Provider Department      9/12/2018 3:30 PM Mahin Penny        Today's Diagnoses     Type 2 diabetes mellitus with stage 3 chronic kidney disease, with long-term current use of insulin (H)    -  1       Follow-ups after your visit        Follow-up notes from your care team     Return in 4 weeks (on 10/10/2018).      Your next 10 appointments already scheduled     Oct 02, 2018  3:00 PM CDT   Diabetes Education with [place] [facility]   [facility] [place] ([facility])    [address] [address]   [phone]            Jan 14, 2019 10:00 AM CST   LAB with LAB FIRST FLOOR formerly [facility] ([facility])    [address] [place] [address] [id number] [phone]           Please do not eat 10-12 hours before your appointment if you are coming in fasting for labs on lipids, cholesterol, or glucose (sugar). This does not apply to pregnant women. Water, hot tea and black coffee (with nothing added) are okay. Do not drink other fluids, diet soda or chew gum.            Jan 14, 2019 10:30 AM CST   Return Visit with Sánchez Dias MD   [facility]    [address] [place] [address] [id number] [phone]              Who to contact     If you have questions or need follow up information about today's clinic visit or your schedule please contact Tolstoy NASIR ZHANG directly at [phone].  Normal or non-critical lab and imaging results will be communicated to you by MyChart, letter or phone within 4 business days after the clinic has received the results. If you do not hear from us within 7 days, please contact the clinic through MyChart or phone. If you have a  critical or abnormal lab result, we will notify you by phone as soon as possible.  Submit refill requests through [employer] or call your pharmacy and they will forward the refill request to us. Please allow 3 business days for your refill to be completed.          Additional Information About Your Visit        Care EveryWhere ID     This is your Care EveryWhere ID. This could be used by other organizations to access your [place] medical records  [id number]         Blood Pressure from Last 3 Encounters:   08/13/18 142/57   08/01/18 137/45   07/10/18 129/74    Weight from Last 3 Encounters:   08/13/18 192 lb (87.1 kg)   08/01/18 195 lb 9.6 oz (88.7 kg)   07/10/18 192 lb (87.1 kg)              Today, you had the following     No orders found for display       Primary Care Provider Office Phone # Fax #    Jaleesa Velasquez VERONIQUE [phone] [address] [facility] [address]        Goals        General    Problem Solving (pt-stated)     Notes - Note created  8/27/2018  3:52 PM by Dolly Riley RD    Goal Statement: add in pre dinner blood sugar check M-[id number]  Measure of Success: review of blood sugar logs in 1 month  Supportive Steps to Achieve:  Highlight days to check before dinner   Barriers: remembering  Strengths: You already are checking your blood sugar very well   Date to Achieve By: 1 month, next visit   Patient expressed understanding of goal: yes            Equal Access to Services     [facility]: Hadii juan manuel Santiago, waaxda luqadaha, qaybta kaalmada alejo, aaron munoz. So [facility] [phone].    ATENCIÓN: Si habla español, tiene a gil disposición servicios gratuitos de asistencia lingüística. Llame al [phone].    We comply with applicable federal civil rights laws and [place] laws. We do not discriminate on the basis of race, color, national origin, age, disability, sex, sexual orientation, or gender identity.             Thank you!     Thank you for choosing [facility] (formerly [facility])  for your care. Our goal is always to provide you with excellent care. Hearing back from our patients is one way we can continue to improve our services. Please take a few minutes to complete the written survey that you may receive in the mail after your visit with us. Thank you!             Your Updated Medication List - Protect others around you: Learn how to safely use, store and throw away your medicines at www.disposemymeds.org.          This list is accurate as of 9/12/18 11:59 PM.  Always use your most recent med list.                   Brand Name Dispense Instructions for use Diagnosis    ABILIFY 30 MG tablet   Generic drug:  ARIPiprazole      Take 30 mg by mouth daily        albuterol 108 (90 Base) MCG/ACT inhaler    PROAIR HFA/PROVENTIL HFA/VENTOLIN HFA    1 Inhaler    Inhale 2 puffs into the lungs every 6 hours as needed for shortness of breath / dyspnea or wheezing    Mild intermittent asthma with acute exacerbation       amLODIPine 5 MG tablet    NORVASC    90 tablet    Take 1 tablet (5 mg) by mouth daily    Essential hypertension       blood glucose monitoring lancets     100 each    Use to test blood sugar 3 times daily    Type 2 diabetes mellitus with stage 3 chronic kidney disease, with long-term current use of insulin (H)       blood glucose monitoring test strip    no brand specified    100 each    1 strip by In Vitro route 3 times daily    Type 2 diabetes mellitus with stage 3 chronic kidney disease, with long-term current use of insulin (H)       chlorthalidone 25 MG tablet    HYGROTON     Take 12.5 mg by mouth        Clozapine 200 MG tablet    CLOZARIL     Take 200 mg by mouth 2 times daily        continuous blood glucose monitoring sensor     3 each    For use with Freestyle Yash Flash  for continuous monitioring of blood glucose levels. Replace sensor every 10 days.    Type 2 diabetes mellitus with stage 3 chronic kidney  disease, with long-term current use of insulin (H)       dulaglutide 1.5 MG/0.5ML pen    TRULICITY    6 mL    Inject 1.5 mg Subcutaneous every 7 days    Type 2 diabetes mellitus with stage 3 chronic kidney disease, without long-term current use of insulin (H)       FLUoxetine 20 MG capsule    PROzac     Take 20 mg by mouth daily        FREESTYLE BRIANNA READER Nakia     1 Device    1 Device 3 times daily as needed    Type 2 diabetes mellitus with stage 3 chronic kidney disease, with long-term current use of insulin (H)       insulin glargine 100 UNIT/ML injection    LANTUS SOLOSTAR    15 mL    Inject 19 units daily    Type 2 diabetes mellitus with stage 3 chronic kidney disease, with long-term current use of insulin (H)       lamoTRIgine 25 MG tablet    LaMICtal     Take 1 tablet by mouth at bedtime x2 weeks, then 2 tablets at bedtime thereafter        lisinopril 5 MG tablet    PRINIVIL/ZESTRIL    90 tablet    Take 1 tablet (5 mg) by mouth daily    Essential hypertension       loratadine 10 MG tablet    CLARITIN    90 tablet    Take 1 tablet (10 mg) by mouth every morning    Chronic seasonal allergic rhinitis, unspecified trigger       montelukast 10 MG tablet    SINGULAIR    90 tablet    Take 1 tablet (10 mg) by mouth At Bedtime    Mild intermittent asthma with acute exacerbation       omeprazole 20 MG CR capsule    priLOSEC    60 capsule    Take 1 capsule (20 mg) by mouth daily 1 capsule bid    Gastroesophageal reflux disease without esophagitis       ranitidine 300 MG tablet    ZANTAC    90 tablet    Take 1 tablet (300 mg) by mouth At Bedtime    Gastroesophageal reflux disease without esophagitis       simvastatin 20 MG tablet    ZOCOR     Take 20 mg by mouth        Sod Fluoride-Potassium Nitrate 1.1-5 % Pste           ULTICARE MINI 31G X 6 MM   Generic drug:  insulin pen needle      AS DIRECTED WITH LANTUS PENS        ULTILET SHARPS CONTAINER 1QT Misc     1 each    1 Device as needed    Type 2 diabetes mellitus  with stage 3 chronic kidney disease, without long-term current use of insulin (H)

## 2018-09-12 NOTE — PROGRESS NOTES
September 12, 2018    [facility]  [address]  [place] [address]  [phone]  Health Coaching Progress Note    Patient Name: Divina Delgadillo Date: September 12, 2018      Session Length: 35      DATA    PRM Master Survey Scores Reviewed: Yes    Core Healthy Days Survey:         EMY Score (Last Two) 5/10/2018   EMY Raw Score 35   Activation Score 72.1   EMY Level 3       PHQ-2 Score 6/14/2018 5/10/2018   PHQ-2 Total Score Interpretation - Positive if 3 or more points; Administer PHQ-9 if positive 0 1       PHQ-9 SCORE 12/7/2017 5/23/2018   Total Score 12 2       Treatment Objective(s) Addressed in This Session:  Target Behavior(s): diet/weight loss and smoking    Current Stressors / Issues:  Divina shares today that her feet are hurting her a lot still.     What Patient Does Well:   Divina has been working on her goals around diet and exercise and feels things are going well overall.  Previous Successes:   Divina has started her new job and is really happy about this.  Areas in Need of Improvement:   Divina says that her foot pain is a new area in need of improvement at this point and plans to discuss adding a medication for this to see if this helps. Writer shares that BG control is important also to this and Divina replies that her BG levels have been good lately.     Divina says the other area in need of improvement is thinking more about quitting smoking. We discuss this further during our visit today as well as methods of support for quitting. Divina is open to using NRT (patches) and possibly Chantix also (as combo therapy is most effective). Divina plans to think about this further and may discuss with her provider at her next follow up as well. Writer affirms Divina's plan.  Barriers to Change:   Divina says quitting would be hard as all of her housemates smoke as does her foster mom.  Reasons for Change:   Divina wants to make changes for her  health. Divina wants to quit smoking for her health and also financial reasons.  Plan/Goal for the Next 4 Weeks:     GOAL #1: Continue doing something active or exercising most days-target goal of 3x/week now-started new job so struggling some  GOAL #1 Progress Toward Goal: 50%  GOAL #2: Continue increasing healthy eating habits-increase days eating breakfast, increase vegetables, decrease portions, monitor carbs  GOAL #2 Progress Toward Goal: 100%  GOAL #3: Begin thinking about tobacco cessation  GOAL #3 Progress Toward Goal: 0%    Intervention:  Motivational Interviewing    MI Intervention: Expressed Empathy/Understanding, Supported Autonomy, Collaboration, Evocation, Permission to raise concern or advise, Open-ended questions, Reflections: simple and complex, Change talk (evoked) and Reframe     Change Talk Expressed by the Patient: Desire to change Ability to change Reasons to change Committment to change Activation    Provider Response to Change Talk: E - Evoked more info from patient about behavior change, A - Affirmed patient's thoughts, decisions, or attempts at behavior change, R - Reflected patient's change talk and S - Summarized patient's change talk statements    Assessment / Progress on Treatment Objective(s) / Homework:    Satisfactory progress - ACTION (Actively working towards change); Intervened by reinforcing change plan / affirming steps taken         Plan: (Homework, other):  Patient was encouraged to continue to seek condition-related information and education, as well as schedule a follow up appointment with the Health  in 4 weeks. Patient has set self-identified goals and will monitor progress until the next appointment.  Next visit scheduled for October 10 at 3:30PM.      Mahin Penny

## 2018-09-20 ENCOUNTER — OFFICE VISIT (OUTPATIENT)
Dept: FAMILY MEDICINE | Facility: CLINIC | Age: 33
End: 2018-09-20
Payer: MEDICARE

## 2018-09-20 VITALS
SYSTOLIC BLOOD PRESSURE: 124 MMHG | HEART RATE: 90 BPM | BODY MASS INDEX: 33.29 KG/M2 | OXYGEN SATURATION: 98 % | RESPIRATION RATE: 17 BRPM | TEMPERATURE: 98.4 F | DIASTOLIC BLOOD PRESSURE: 58 MMHG | HEIGHT: 64 IN | WEIGHT: 195 LBS

## 2018-09-20 DIAGNOSIS — E11.9 TYPE 2 DIABETES MELLITUS WITHOUT COMPLICATION, WITH LONG-TERM CURRENT USE OF INSULIN (H): ICD-10-CM

## 2018-09-20 DIAGNOSIS — H66.001 RIGHT ACUTE SUPPURATIVE OTITIS MEDIA: Primary | ICD-10-CM

## 2018-09-20 DIAGNOSIS — F25.0 SCHIZOAFFECTIVE DISORDER, BIPOLAR TYPE (H): ICD-10-CM

## 2018-09-20 DIAGNOSIS — Z79.4 TYPE 2 DIABETES MELLITUS WITHOUT COMPLICATION, WITH LONG-TERM CURRENT USE OF INSULIN (H): ICD-10-CM

## 2018-09-20 DIAGNOSIS — Z79.899 MEDICATION MANAGEMENT: ICD-10-CM

## 2018-09-20 LAB
BASOPHILS # BLD AUTO: 0.1 10E9/L (ref 0–0.2)
BASOPHILS NFR BLD AUTO: 0.9 %
CREAT UR-MCNC: 101 MG/DL
DIFFERENTIAL METHOD BLD: ABNORMAL
EOSINOPHIL # BLD AUTO: 0.3 10E9/L (ref 0–0.7)
EOSINOPHIL NFR BLD AUTO: 2.3 %
ERYTHROCYTE [DISTWIDTH] IN BLOOD BY AUTOMATED COUNT: 14.1 % (ref 10–15)
HCT VFR BLD AUTO: 36.9 % (ref 35–47)
HGB BLD-MCNC: 11.9 G/DL (ref 11.7–15.7)
LYMPHOCYTES # BLD AUTO: 4.1 10E9/L (ref 0.8–5.3)
LYMPHOCYTES NFR BLD AUTO: 31.3 %
MCH RBC QN AUTO: 31.4 PG (ref 26.5–33)
MCHC RBC AUTO-ENTMCNC: 32.2 G/DL (ref 31.5–36.5)
MCV RBC AUTO: 97 FL (ref 78–100)
MICROALBUMIN UR-MCNC: 6 MG/L
MICROALBUMIN/CREAT UR: 6.36 MG/G CR (ref 0–25)
MONOCYTES # BLD AUTO: 1 10E9/L (ref 0–1.3)
MONOCYTES NFR BLD AUTO: 7.4 %
NEUTROPHILS # BLD AUTO: 7.7 10E9/L (ref 1.6–8.3)
NEUTROPHILS NFR BLD AUTO: 58.1 %
PLATELET # BLD AUTO: 375 10E9/L (ref 150–450)
RBC # BLD AUTO: 3.79 10E12/L (ref 3.8–5.2)
WBC # BLD AUTO: 13.2 10E9/L (ref 4–11)

## 2018-09-20 PROCEDURE — 36415 COLL VENOUS BLD VENIPUNCTURE: CPT | Performed by: CLINICAL NURSE SPECIALIST

## 2018-09-20 PROCEDURE — 85025 COMPLETE CBC W/AUTO DIFF WBC: CPT | Performed by: CLINICAL NURSE SPECIALIST

## 2018-09-20 PROCEDURE — 82043 UR ALBUMIN QUANTITATIVE: CPT | Performed by: FAMILY MEDICINE

## 2018-09-20 PROCEDURE — 99213 OFFICE O/P EST LOW 20 MIN: CPT | Performed by: FAMILY MEDICINE

## 2018-09-20 NOTE — PATIENT INSTRUCTIONS
Thank you for choosing [facility].  You may be receiving a survey in the mail from [facility] regarding your visit today.  Please take a few minutes to complete and return the survey to let us know how we are doing.      If you have questions or concerns, please contact us via [employer] or you can contact your care team at [phone].    Our Clinic hours are:  Monday 6:40 am  to 7:00 pm  Tuesday -Friday 6:40 am to 5:00 pm    The [place] outpatient lab hours are:  Monday - Friday 6:10 am to 4:45 pm  Saturdays 7:00 am to 11:00 am  Appointments are required, call [phone]    If you have clinical questions after hours or would like to schedule an appointment,  call the clinic at [phone].  Otitis Media (Middle-Ear Infection) in Adults  Otitis media is another name for a middle-ear infection. It means an infection behind your eardrum. This kind of ear infection can happen after any condition that keeps fluid from draining from the middle ear. These conditions include allergies, a cold, a sore throat, or a respiratory infection.  Middle-ear infections are common in children, but they can also happen in adults. An ear infection in an adult may mean a more serious problem than in a child. So you may need additional tests. If you have an ear infection, you should see your health care provider for treatment.  What are the types of middle-ear infections?  Infections can affect the middle ear in several ways. They are:    Acute otitis media. This middle-ear infection occurs suddenly. It causes swelling and redness. Fluid and mucus become trapped inside the ear. You can have a fever and ear pain.    Otitis media with effusion. Fluid (effusion) and mucus build up in the middle ear after the infection goes away. You may feel like your middle ear is full. This can continue for months and may affect your hearing.    Chronic otitis media with effusion. Fluid (effusion) remains in the middle ear for a long time.  Or it builds up again and again, even though there is no infection. This type of middle-ear infection may be hard to treat. It may also affect your hearing.  Who is more likely to get a middle-ear infection?  You are more likely to get an ear infection if you:    Smoke or are around someone who smokes    Have seasonal or year-round allergy symptoms    Have a cold or other upper respiratory infection  What causes a middle-ear infection?  The middle ear connects to the throat by a canal called the eustachian tube. This tube helps even out the pressure between the outer ear and the inner ear. A cold or allergy can irritate the tube or cause the area around it to swell. This can keep fluid from draining from the middle ear. The fluid builds up behind the eardrum. Bacteria and viruses can grow in this fluid. The bacteria and viruses cause the middle-ear infection.  What are the symptoms of a middle-ear infection?  Common symptoms of a middle-ear infection in adults are:    Pain in 1 or both ears    Drainage from the ear    Muffled hearing    Sore throat   You may also have a fever. Rarely, your balance can be affected.  These symptoms may be the same as for other conditions. It s important to talk with your health care provider if you think you have a middle-ear infection. If you have a high fever, severe pain behind your ear, or paralysis in your face, see your provider as soon as you can.  How is a middle-ear infection diagnosed?  Your health care provider will take a medical history and do a physical exam. He or she will look at the outer ear and eardrum with an otoscope. The otoscope is a lighted tool that lets your provider see inside the ear. A pneumatic otoscope blows a puff of air into the ear to check how well your eardrum moves. If you eardrum doesn t move well, it may mean you have fluid behind it.  Your provider may also do a test called tympanometry. This test tells how well the middle ear is working. It can  find any changes in pressure in the middle ear. Your provider may test your hearing with a tuning fork.  How is a middle-ear infection treated?  A middle-ear infection may be treated with:    Antibiotics, taken by mouth or as ear drops    Medication for pain    Decongestants, antihistamines, or nasal steroids  Your health care provider may also have you try autoinsufflation. This helps adjust the air pressure in your ear. For this, you pinch your nose and gently exhale. This forces air back through the eustachian tube.  The exact treatment for your ear infection will depend on the type of infection you have. In general, if your symptoms don t get better in 48 to 72 hours, contact your health care provider.  Middle-ear infections can cause long-term problems if not treated. They can lead to:    Infection in other parts of the head    Permanent hearing loss    Paralysis of a nerve in your face  If you have a middle-ear infection that doesn t get better, you may need to see an ear, nose, and throat specialist (otolaryngologist). You may need a CT scan or MRI to check for head and neck cancer.  Ear tubes  Sometimes fluid stays in the middle ear even after you take antibiotics and the infection goes away. In this case, your health care provider may suggest that a small tube be placed in your ear. The tube is put at the opening of the eardrum. The tube keeps fluid from building up and relieves pressure in the middle ear. It can also help you hear better. This surgery is called myringotomy. It is not often done in adults.  The tubes usually fall out on their own after 6 months to a year.    5310-2916 [employer]. [address] [place] [address], [place] [address]. All rights reserved. This information is not intended as a substitute for professional medical care. Always follow your healthcare professional's instructions.

## 2018-09-20 NOTE — PROGRESS NOTES
SUBJECTIVE:   Divina Delgadillo is a 33 year old female who presents to clinic today for the following health issues:      Concern - hearing concerns mostly in R ear   Onset: 2 mos ago     Description:   Patient has noticed her hear has been bad for the last 2 mos and yesterday she started having pain     Intensity: moderate    Progression of Symptoms:  Worsening started with pain yesterday     Accompanying Signs & Symptoms:  None     Previous history of similar problem:   No    Precipitating factors:   Worsened by: none     Alleviating factors:  Improved by: none     Therapies Tried and outcome: none     Patient comes in with concerns of hearing loss. She reports that this has been ongoing for a few months especially out of her right ear. She reports some mild pain. No drainage from her ears. She denies any trauma to her ears.     Problem list and histories reviewed & adjusted, as indicated.  Additional history: as documented    Patient Active Problem List   Diagnosis     Esophageal reflux     non alcoholic fatty liver disease     HTN (hypertension)     HYPERLIPIDEMIA LDL GOAL <100     DVT (deep venous thrombosis) (H)     CKD (chronic kidney disease) stage 3, GFR 30-59 ml/min     Malignant neoplasm of pancreas, unspecified location of malignancy (H)     Type 2 diabetes mellitus with stage 3 chronic kidney disease, with long-term current use of insulin (H)     Bipolar disorder (H)     Cervical high risk HPV (human papillomavirus) test positive     IUD (intrauterine device) in place     Morbid obesity (H)     Mild intermittent asthma with acute exacerbation     Nicotine abuse     Past Surgical History:   Procedure Laterality Date     ADRENALECTOMY       PANCREATECTOMY PARTIAL       SPLENECTOMY       SURGICAL HISTORY OF -   10/1/2000    Tympanoplasty     SURGICAL HISTORY OF -   10/1/2000    Tonsillectomy       Social History   Substance Use Topics     Smoking status: Former Smoker     Packs/day: 1.00     Years: 4.00      Types: Cigarettes     Smokeless tobacco: Never Used      Comment: 15 cig/day.  I strongly emphasized the importance of quitting smoking.     Alcohol use No     Family History   Problem Relation Age of Onset     Respiratory Mother      ASTHMA     Allergies Mother      Depression Mother      HEART DISEASE Father      HEART VALVE REPLACEMENT     Hypertension Father      Diabetes Father      Depression Father      Respiratory Brother      Allergies Brother      Respiratory Sister      Allergies Sister      Depression Sister      Respiratory Sister      Allergies Sister      Depression Sister      KIDNEY DISEASE No family hx of          Current Outpatient Prescriptions   Medication Sig Dispense Refill     albuterol (PROAIR HFA/PROVENTIL HFA/VENTOLIN HFA) 108 (90 BASE) MCG/ACT Inhaler Inhale 2 puffs into the lungs every 6 hours as needed for shortness of breath / dyspnea or wheezing 1 Inhaler 3     amLODIPine (NORVASC) 5 MG tablet Take 1 tablet (5 mg) by mouth daily 90 tablet 1     amoxicillin-clavulanate (AUGMENTIN) 875-125 MG per tablet Take 1 tablet by mouth 2 times daily 14 tablet 0     blood glucose monitoring (NO BRAND SPECIFIED) test strip 1 strip by In Vitro route 3 times daily 100 each 11     blood glucose monitoring (SOFTCLIX) lancets Use to test blood sugar 3 times daily 100 each 11     chlorthalidone (HYGROTON) 25 MG tablet Take 12.5 mg by mouth       dulaglutide (TRULICITY) 1.5 MG/0.5ML pen Inject 1.5 mg Subcutaneous every 7 days 6 mL 3     FLUoxetine (PROZAC) 20 MG capsule Take 20 mg by mouth daily       insulin glargine (LANTUS SOLOSTAR) 100 UNIT/ML pen Inject 19 units daily 15 mL 2     lamoTRIgine (LAMICTAL) 25 MG tablet Take 1 tablet by mouth at bedtime x2 weeks, then 2 tablets at bedtime thereafter  0     lisinopril (PRINIVIL/ZESTRIL) 5 MG tablet Take 1 tablet (5 mg) by mouth daily 90 tablet 3     montelukast (SINGULAIR) 10 MG tablet Take 1 tablet (10 mg) by mouth At Bedtime 90 tablet 1      "omeprazole (PRILOSEC) 20 MG CR capsule Take 1 capsule (20 mg) by mouth daily 1 capsule bid 60 capsule 5     ranitidine (ZANTAC) 300 MG tablet Take 1 tablet (300 mg) by mouth At Bedtime 90 tablet 1     simvastatin (ZOCOR) 20 MG tablet Take 20 mg by mouth       Sod Fluoride-Potassium Nitrate 1.1-5 % PSTE        ARIPiprazole (ABILIFY) 30 MG tablet Take 30 mg by mouth daily       Clozapine (CLOZARIL) 200 MG tablet Take 200 mg by mouth 2 times daily       Continuous Blood Gluc  (FREESTYLE BRIANNA READER) VANE 1 Device 3 times daily as needed 1 Device 3     continuous blood glucose monitoring (FREESTYLE BRIANNA) sensor For use with Freestyle Brianna Flash  for continuous monitioring of blood glucose levels. Replace sensor every 10 days. 3 each 11     loratadine (CLARITIN) 10 MG tablet Take 1 tablet (10 mg) by mouth every morning (Patient not taking: Reported on 9/20/2018) 90 tablet 1     ULTICARE MINI 31G X 6 MM insulin pen needle AS DIRECTED WITH LANTUS PENS  11     ULTILET SHARPS CONTAINER 1QT MISC 1 Device as needed 1 each 11     Allergies   Allergen Reactions     Acetaminophen Other (See Comments)     pt previously tried to overdose on it, PMD does not want pt taking per pt.      Latex      Latex Rash     BP Readings from Last 3 Encounters:   09/20/18 124/58   08/13/18 142/57   08/01/18 137/45    Wt Readings from Last 3 Encounters:   09/20/18 195 lb (88.5 kg)   08/13/18 192 lb (87.1 kg)   08/01/18 195 lb 9.6 oz (88.7 kg)                  Labs reviewed in EPIC    Reviewed and updated as needed this visit by clinical staff  Tobacco  Allergies  Meds  Med Hx  Surg Hx  Fam Hx  Soc Hx      Reviewed and updated as needed this visit by Provider         ROS:  Constitutional, HEENT, cardiovascular, pulmonary, gi and gu systems are negative, except as otherwise noted.    OBJECTIVE:     /58  Pulse 90  Temp 98.4  F (36.9  C) (Tympanic)  Resp 17  Ht 5' 4\" (1.626 m)  Wt 195 lb (88.5 kg)  SpO2 98%  BMI " 33.47 kg/m2  Body mass index is 33.47 kg/(m^2).  GENERAL: healthy, alert and no distress  HENT: normal cephalic/atraumatic, right ear: erythematous and purulent drainage in canal, left ear: clear effusion, nose and mouth without ulcers or lesions, oropharynx clear and oral mucous membranes moist  NECK: no adenopathy, no asymmetry, masses, or scars and thyroid normal to palpation  RESP: lungs clear to auscultation - no rales, rhonchi or wheezes  CV: regular rate and rhythm, normal S1 S2, no S3 or S4, no murmur, click or rub, no peripheral edema and peripheral pulses strong  ABDOMEN: soft, nontender, no hepatosplenomegaly, no masses and bowel sounds normal  MS: no gross musculoskeletal defects noted, no edema    Diagnostic Test Results:  none     ASSESSMENT/PLAN:       1. Right acute suppurative otitis media  Asked that we treat the ear infection if the hearing loss persists consider a hearing evaluation .   - amoxicillin-clavulanate (AUGMENTIN) 875-125 MG per tablet; Take 1 tablet by mouth 2 times daily  Dispense: 14 tablet; Refill: 0    2. Type 2 diabetes mellitus without complication, with long-term current use of insulin (H)  Patient will be notified of results  - Albumin Random Urine Quantitative with Creat Ratio; Future    FUTURE APPOINTMENTS:       - Follow-up visit as needed  Patient Instructions         Thank you for choosing [facility].  You may be receiving a survey in the mail from [employer] regarding your visit today.  Please take a few minutes to complete and return the survey to let us know how we are doing.      If you have questions or concerns, please contact us via [employer] or you can contact your care team at [phone].    Our Clinic hours are:  Monday 6:40 am  to 7:00 pm  Tuesday -Friday 6:40 am to 5:00 pm    The [place] outpatient lab hours are:  Monday - Friday 6:10 am to 4:45 pm  Saturdays 7:00 am to 11:00 am  Appointments are required, call [phone]    If you have clinical  questions after hours or would like to schedule an appointment,  call the clinic at [phone].  Otitis Media (Middle-Ear Infection) in Adults  Otitis media is another name for a middle-ear infection. It means an infection behind your eardrum. This kind of ear infection can happen after any condition that keeps fluid from draining from the middle ear. These conditions include allergies, a cold, a sore throat, or a respiratory infection.  Middle-ear infections are common in children, but they can also happen in adults. An ear infection in an adult may mean a more serious problem than in a child. So you may need additional tests. If you have an ear infection, you should see your health care provider for treatment.  What are the types of middle-ear infections?  Infections can affect the middle ear in several ways. They are:    Acute otitis media. This middle-ear infection occurs suddenly. It causes swelling and redness. Fluid and mucus become trapped inside the ear. You can have a fever and ear pain.    Otitis media with effusion. Fluid (effusion) and mucus build up in the middle ear after the infection goes away. You may feel like your middle ear is full. This can continue for months and may affect your hearing.    Chronic otitis media with effusion. Fluid (effusion) remains in the middle ear for a long time. Or it builds up again and again, even though there is no infection. This type of middle-ear infection may be hard to treat. It may also affect your hearing.  Who is more likely to get a middle-ear infection?  You are more likely to get an ear infection if you:    Smoke or are around someone who smokes    Have seasonal or year-round allergy symptoms    Have a cold or other upper respiratory infection  What causes a middle-ear infection?  The middle ear connects to the throat by a canal called the eustachian tube. This tube helps even out the pressure between the outer ear and the inner ear. A cold or allergy can  irritate the tube or cause the area around it to swell. This can keep fluid from draining from the middle ear. The fluid builds up behind the eardrum. Bacteria and viruses can grow in this fluid. The bacteria and viruses cause the middle-ear infection.  What are the symptoms of a middle-ear infection?  Common symptoms of a middle-ear infection in adults are:    Pain in 1 or both ears    Drainage from the ear    Muffled hearing    Sore throat   You may also have a fever. Rarely, your balance can be affected.  These symptoms may be the same as for other conditions. It s important to talk with your health care provider if you think you have a middle-ear infection. If you have a high fever, severe pain behind your ear, or paralysis in your face, see your provider as soon as you can.  How is a middle-ear infection diagnosed?  Your health care provider will take a medical history and do a physical exam. He or she will look at the outer ear and eardrum with an otoscope. The otoscope is a lighted tool that lets your provider see inside the ear. A pneumatic otoscope blows a puff of air into the ear to check how well your eardrum moves. If you eardrum doesn t move well, it may mean you have fluid behind it.  Your provider may also do a test called tympanometry. This test tells how well the middle ear is working. It can find any changes in pressure in the middle ear. Your provider may test your hearing with a tuning fork.  How is a middle-ear infection treated?  A middle-ear infection may be treated with:    Antibiotics, taken by mouth or as ear drops    Medication for pain    Decongestants, antihistamines, or nasal steroids  Your health care provider may also have you try autoinsufflation. This helps adjust the air pressure in your ear. For this, you pinch your nose and gently exhale. This forces air back through the eustachian tube.  The exact treatment for your ear infection will depend on the type of infection you have. In  general, if your symptoms don t get better in 48 to 72 hours, contact your health care provider.  Middle-ear infections can cause long-term problems if not treated. They can lead to:    Infection in other parts of the head    Permanent hearing loss    Paralysis of a nerve in your face  If you have a middle-ear infection that doesn t get better, you may need to see an ear, nose, and throat specialist (otolaryngologist). You may need a CT scan or MRI to check for head and neck cancer.  Ear tubes  Sometimes fluid stays in the middle ear even after you take antibiotics and the infection goes away. In this case, your health care provider may suggest that a small tube be placed in your ear. The tube is put at the opening of the eardrum. The tube keeps fluid from building up and relieves pressure in the middle ear. It can also help you hear better. This surgery is called myringotomy. It is not often done in adults.  The tubes usually fall out on their own after 6 months to a year.    [phone] [employer]. [address] [place] [id number]. All rights reserved. This information is not intended as a substitute for professional medical care. Always follow your healthcare professional's instructions.            Portia Reese MD  [facility]

## 2018-09-20 NOTE — PROGRESS NOTES
Please inform patient that test result was within normal parameters.   Thank you.     Portia Reese M.D.

## 2018-09-20 NOTE — LETTER
September 20, 2018      Divina Delgadillo  [id number] [address] [place] [id number]        Dear ,    We are writing to inform you of your test results.    Your test results fall within the expected range(s).    Resulted Orders   Albumin Random Urine Quantitative with Creat Ratio   Result Value Ref Range    Creatinine Urine 101 mg/dL    Albumin Urine mg/L 6 mg/L    Albumin Urine mg/g Cr 6.36 0 - 25 mg/g Cr       If you have any questions or concerns, please call the clinic at the number listed above.       Sincerely,        Portia Reese MD

## 2018-09-20 NOTE — MR AVS SNAPSHOT
After Visit Summary   9/20/2018    Divina Delgadillo    MRN: [id number]           Patient Information     Date Of Birth          1985        Visit Information        Provider Department      9/20/2018 7:40 AM Portia Reese MD [facility]        Today's Diagnoses     Type 2 diabetes mellitus without complication, with long-term current use of insulin (H)    -  1    Right acute suppurative otitis media          Care Instructions          Thank you for choosing [facility].  You may be receiving a survey in the mail from [facility] regarding your visit today.  Please take a few minutes to complete and return the survey to let us know how we are doing.      If you have questions or concerns, please contact us via Mbite or you can contact your care team at [phone].    Our Clinic hours are:  Monday 6:40 am  to 7:00 pm  Tuesday -Friday 6:40 am to 5:00 pm    The [place] outpatient lab hours are:  Monday - Friday 6:10 am to 4:45 pm  Saturdays 7:00 am to 11:00 am  Appointments are required, call [phone]    If you have clinical questions after hours or would like to schedule an appointment,  call the clinic at [phone].  Otitis Media (Middle-Ear Infection) in Adults  Otitis media is another name for a middle-ear infection. It means an infection behind your eardrum. This kind of ear infection can happen after any condition that keeps fluid from draining from the middle ear. These conditions include allergies, a cold, a sore throat, or a respiratory infection.  Middle-ear infections are common in children, but they can also happen in adults. An ear infection in an adult may mean a more serious problem than in a child. So you may need additional tests. If you have an ear infection, you should see your health care provider for treatment.  What are the types of middle-ear infections?  Infections can affect the middle ear in several ways. They are:    Acute otitis  media. This middle-ear infection occurs suddenly. It causes swelling and redness. Fluid and mucus become trapped inside the ear. You can have a fever and ear pain.    Otitis media with effusion. Fluid (effusion) and mucus build up in the middle ear after the infection goes away. You may feel like your middle ear is full. This can continue for months and may affect your hearing.    Chronic otitis media with effusion. Fluid (effusion) remains in the middle ear for a long time. Or it builds up again and again, even though there is no infection. This type of middle-ear infection may be hard to treat. It may also affect your hearing.  Who is more likely to get a middle-ear infection?  You are more likely to get an ear infection if you:    Smoke or are around someone who smokes    Have seasonal or year-round allergy symptoms    Have a cold or other upper respiratory infection  What causes a middle-ear infection?  The middle ear connects to the throat by a canal called the eustachian tube. This tube helps even out the pressure between the outer ear and the inner ear. A cold or allergy can irritate the tube or cause the area around it to swell. This can keep fluid from draining from the middle ear. The fluid builds up behind the eardrum. Bacteria and viruses can grow in this fluid. The bacteria and viruses cause the middle-ear infection.  What are the symptoms of a middle-ear infection?  Common symptoms of a middle-ear infection in adults are:    Pain in 1 or both ears    Drainage from the ear    Muffled hearing    Sore throat   You may also have a fever. Rarely, your balance can be affected.  These symptoms may be the same as for other conditions. It s important to talk with your health care provider if you think you have a middle-ear infection. If you have a high fever, severe pain behind your ear, or paralysis in your face, see your provider as soon as you can.  How is a middle-ear infection diagnosed?  Your health care  provider will take a medical history and do a physical exam. He or she will look at the outer ear and eardrum with an otoscope. The otoscope is a lighted tool that lets your provider see inside the ear. A pneumatic otoscope blows a puff of air into the ear to check how well your eardrum moves. If you eardrum doesn t move well, it may mean you have fluid behind it.  Your provider may also do a test called tympanometry. This test tells how well the middle ear is working. It can find any changes in pressure in the middle ear. Your provider may test your hearing with a tuning fork.  How is a middle-ear infection treated?  A middle-ear infection may be treated with:    Antibiotics, taken by mouth or as ear drops    Medication for pain    Decongestants, antihistamines, or nasal steroids  Your health care provider may also have you try autoinsufflation. This helps adjust the air pressure in your ear. For this, you pinch your nose and gently exhale. This forces air back through the eustachian tube.  The exact treatment for your ear infection will depend on the type of infection you have. In general, if your symptoms don t get better in 48 to 72 hours, contact your health care provider.  Middle-ear infections can cause long-term problems if not treated. They can lead to:    Infection in other parts of the head    Permanent hearing loss    Paralysis of a nerve in your face  If you have a middle-ear infection that doesn t get better, you may need to see an ear, nose, and throat specialist (otolaryngologist). You may need a CT scan or MRI to check for head and neck cancer.  Ear tubes  Sometimes fluid stays in the middle ear even after you take antibiotics and the infection goes away. In this case, your health care provider may suggest that a small tube be placed in your ear. The tube is put at the opening of the eardrum. The tube keeps fluid from building up and relieves pressure in the middle ear. It can also help you hear  better. This surgery is called myringotomy. It is not often done in adults.  The tubes usually fall out on their own after 6 months to a year.    2875-7123 [employer]. [address] [place] [address], [place] [address]. All rights reserved. This information is not intended as a substitute for professional medical care. Always follow your healthcare professional's instructions.                Follow-ups after your visit        Your next 10 appointments already scheduled     Sep 20, 2018  8:20 AM CDT   [facility] [place] [facility]    [address] [address]   [phone]           Please do not eat 10-12 hours before your appointment if you are coming in fasting for labs on lipids, cholesterol, or glucose (sugar). This does not apply to pregnant women. Water, hot tea and black coffee (with nothing added) are okay. Do not drink other fluids, diet soda or chew gum.            Oct 02, 2018  3:00 PM CDT   Diabetes Education with [place] [facility]   [facility] ([facility])    [address] [address]   [phone]            Jan 14, 2019 10:00 AM CST   LAB with LAB FIRST FLOOR [facility] ([facility])    [address] [place] [id number] [phone]           Please do not eat 10-12 hours before your appointment if you are coming in fasting for labs on lipids, cholesterol, or glucose (sugar). This does not apply to pregnant women. Water, hot tea and black coffee (with nothing added) are okay. Do not drink other fluids, diet soda or chew gum.            Jan 14, 2019 10:30 AM CST   Return Visit with Sánchez Dias MD   [facility] ([facility])    [address] [place] [id number] [phone]              Future tests that were ordered for you today     Open Future Orders         "Priority Expected Expires Ordered    Albumin Random Urine Quantitative with Creat Ratio Routine 9/20/2018 9/20/2019 9/20/2018            Who to contact     If you have questions or need follow up information about today's clinic visit or your schedule please contact [facility] directly at [phone].  Normal or non-critical lab and imaging results will be communicated to you by MyChart, letter or phone within 4 business days after the clinic has received the results. If you do not hear from us within 7 days, please contact the clinic through MyChart or phone. If you have a critical or abnormal lab result, we will notify you by phone as soon as possible.  Submit refill requests through [employer] or call your pharmacy and they will forward the refill request to us. Please allow 3 business days for your refill to be completed.          Additional Information About Your Visit        Care EveryWhere ID     This is your Care EveryWhere ID. This could be used by other organizations to access your [facility] medical records  [id number]        Your Vitals Were     Pulse Temperature Respirations Height Pulse Oximetry BMI (Body Mass Index)    90 98.4  F (36.9  C) (Tympanic) 17 5' 4\" (1.626 m) 98% 33.47 kg/m2       Blood Pressure from Last 3 Encounters:   09/20/18 124/58   08/13/18 142/57   08/01/18 137/45    Weight from Last 3 Encounters:   09/20/18 195 lb (88.5 kg)   08/13/18 192 lb (87.1 kg)   08/01/18 195 lb 9.6 oz (88.7 kg)                 Today's Medication Changes          These changes are accurate as of 9/20/18  8:08 AM.  If you have any questions, ask your nurse or doctor.               Start taking these medicines.        Dose/Directions    amoxicillin-clavulanate 875-125 MG per tablet   Commonly known as:  AUGMENTIN   Used for:  Right acute suppurative otitis media   Started by:  Portia Reese MD        Dose:  1 tablet   Take 1 tablet by mouth 2 times daily   Quantity:  14 tablet "   Refills:  0            Where to get your medicines      These medications were sent to [place] [facility] - - MATIAS Mcnamara - [address]  [address] [facility], [place] [address]    Hours:  [facility] [place] [facility] Phone:  [phone]     amoxicillin-clavulanate 875-125 MG per tablet                Primary Care Provider Office Phone # Fax #    VERONIQUE Bledsoe [phone] [address] [facility] [address]        Goals        General    Problem Solving (pt-stated)     Notes - Note created  8/27/2018  3:52 PM by Dolly Riley RD    Goal Statement: add in pre dinner blood sugar check M-W-F  Measure of Success: review of blood sugar logs in 1 month  Supportive Steps to Achieve:  Highlight days to check before dinner   Barriers: remembering  Strengths: You already are checking your blood sugar very well   Date to Achieve By: 1 month, next visit   Patient expressed understanding of goal: yes            Equal Access to Services     FANG HAY AH: Hadii juan manuel moreno hadyennifero Sotrace, waaxda luqadaha, qaybta kaalmada alejo, aaron echeverria . So [facility] [phone].    ATENCIÓN: Si habla español, tiene a gil disposición servicios gratuitos de asistencia lingüística. Llame al [phone].    We comply with applicable federal civil rights laws and [place] laws. We do not discriminate on the basis of race, color, national origin, age, disability, sex, sexual orientation, or gender identity.            Thank you!     Thank you for choosing [facility]  for your care. Our goal is always to provide you with excellent care. Hearing back from our patients is one way we can continue to improve our services. Please take a few minutes to complete the written survey that you may receive in the mail after your visit with us. Thank you!             Your Updated Medication List - Protect others around you: Learn how to safely  use, store and throw away your medicines at www.disposemymeds.org.          This list is accurate as of 9/20/18  8:08 AM.  Always use your most recent med list.                   Brand Name Dispense Instructions for use Diagnosis    ABILIFY 30 MG tablet   Generic drug:  ARIPiprazole      Take 30 mg by mouth daily        albuterol 108 (90 Base) MCG/ACT inhaler    PROAIR HFA/PROVENTIL HFA/VENTOLIN HFA    1 Inhaler    Inhale 2 puffs into the lungs every 6 hours as needed for shortness of breath / dyspnea or wheezing    Mild intermittent asthma with acute exacerbation       amLODIPine 5 MG tablet    NORVASC    90 tablet    Take 1 tablet (5 mg) by mouth daily    Essential hypertension       amoxicillin-clavulanate 875-125 MG per tablet    AUGMENTIN    14 tablet    Take 1 tablet by mouth 2 times daily    Right acute suppurative otitis media       blood glucose monitoring lancets     100 each    Use to test blood sugar 3 times daily    Type 2 diabetes mellitus with stage 3 chronic kidney disease, with long-term current use of insulin (H)       blood glucose monitoring test strip    no brand specified    100 each    1 strip by In Vitro route 3 times daily    Type 2 diabetes mellitus with stage 3 chronic kidney disease, with long-term current use of insulin (H)       chlorthalidone 25 MG tablet    HYGROTON     Take 12.5 mg by mouth        Clozapine 200 MG tablet    CLOZARIL     Take 200 mg by mouth 2 times daily        continuous blood glucose monitoring sensor     3 each    For use with Freestyle Yash Flash  for continuous monitioring of blood glucose levels. Replace sensor every 10 days.    Type 2 diabetes mellitus with stage 3 chronic kidney disease, with long-term current use of insulin (H)       dulaglutide 1.5 MG/0.5ML pen    TRULICITY    6 mL    Inject 1.5 mg Subcutaneous every 7 days    Type 2 diabetes mellitus with stage 3 chronic kidney disease, without long-term current use of insulin (H)       FLUoxetine  20 MG capsule    PROzac     Take 20 mg by mouth daily        FREESTYLE BRIANNA READER Nakia     1 Device    1 Device 3 times daily as needed    Type 2 diabetes mellitus with stage 3 chronic kidney disease, with long-term current use of insulin (H)       insulin glargine 100 UNIT/ML injection    LANTUS SOLOSTAR    15 mL    Inject 19 units daily    Type 2 diabetes mellitus with stage 3 chronic kidney disease, with long-term current use of insulin (H)       lamoTRIgine 25 MG tablet    LaMICtal     Take 1 tablet by mouth at bedtime x2 weeks, then 2 tablets at bedtime thereafter        lisinopril 5 MG tablet    PRINIVIL/ZESTRIL    90 tablet    Take 1 tablet (5 mg) by mouth daily    Essential hypertension       loratadine 10 MG tablet    CLARITIN    90 tablet    Take 1 tablet (10 mg) by mouth every morning    Chronic seasonal allergic rhinitis, unspecified trigger       montelukast 10 MG tablet    SINGULAIR    90 tablet    Take 1 tablet (10 mg) by mouth At Bedtime    Mild intermittent asthma with acute exacerbation       omeprazole 20 MG CR capsule    priLOSEC    60 capsule    Take 1 capsule (20 mg) by mouth daily 1 capsule bid    Gastroesophageal reflux disease without esophagitis       ranitidine 300 MG tablet    ZANTAC    90 tablet    Take 1 tablet (300 mg) by mouth At Bedtime    Gastroesophageal reflux disease without esophagitis       simvastatin 20 MG tablet    ZOCOR     Take 20 mg by mouth        Sod Fluoride-Potassium Nitrate 1.1-5 % Pste           ULTICARE MINI 31G X 6 MM   Generic drug:  insulin pen needle      AS DIRECTED WITH LANTUS PENS        ULTILET SHARPS CONTAINER 1QT Misc     1 each    1 Device as needed    Type 2 diabetes mellitus with stage 3 chronic kidney disease, without long-term current use of insulin (H)

## 2018-09-21 ASSESSMENT — ASTHMA QUESTIONNAIRES: ACT_TOTALSCORE: 22

## 2018-09-24 DIAGNOSIS — J45.21 MILD INTERMITTENT ASTHMA WITH ACUTE EXACERBATION: ICD-10-CM

## 2018-09-24 DIAGNOSIS — K21.9 GASTROESOPHAGEAL REFLUX DISEASE WITHOUT ESOPHAGITIS: ICD-10-CM

## 2018-09-24 DIAGNOSIS — I10 ESSENTIAL HYPERTENSION: ICD-10-CM

## 2018-09-24 RX ORDER — AMLODIPINE BESYLATE 5 MG/1
5 TABLET ORAL DAILY
Qty: 90 TABLET | Refills: 1 | Status: SHIPPED | OUTPATIENT
Start: 2018-09-24 | End: 2019-01-31

## 2018-09-24 RX ORDER — MONTELUKAST SODIUM 10 MG/1
10 TABLET ORAL AT BEDTIME
Qty: 90 TABLET | Refills: 1 | Status: SHIPPED | OUTPATIENT
Start: 2018-09-24 | End: 2019-02-14

## 2018-09-24 NOTE — TELEPHONE ENCOUNTER
Amlodipine  Routing refill request to provider for review/approval because:  Labs out of range:  CR      Singulair and zantac Prescriptions approved per [facility] Refill Protocol.

## 2018-09-24 NOTE — TELEPHONE ENCOUNTER
"Requested Prescriptions   Pending Prescriptions Disp Refills     amLODIPine (NORVASC) 5 MG tablet  Last Written Prescription Date:  02/07/18  Last Fill Quantity: 90,  # refills: 1   Last office visit: 9/20/2018 with prescribing provider:  09/20/18   Future Office Visit:   Next 5 appointments (look out 90 days)     Oct 10, 2018  3:30 PM CDT   Telephone Visit with Mahin Penny   [facility] [place] ([facility]    [address]  [place] [id number] [phone]               90 tablet 1     Sig: Take 1 tablet (5 mg) by mouth daily    Calcium Channel Blockers Protocol  Failed    9/24/2018  1:23 PM       Failed - Normal serum creatinine on file in past 12 months    Recent Labs   Lab Test  07/25/18   1551   09/01/17   1049   CR  1.65*   < >   --    CREAT   --    --   1.6*    < > = values in this interval not displayed.          Passed - Blood pressure under 140/90 in past 12 months    BP Readings from Last 3 Encounters:   09/20/18 124/58   08/13/18 142/57   08/01/18 137/45          Passed - Recent (12 mo) or future (30 days) visit within the authorizing provider's specialty    Patient had office visit in the last 12 months or has a visit in the next 30 days with authorizing provider or within the authorizing provider's specialty.  See \"Patient Info\" tab in inbasket, or \"Choose Columns\" in Meds & Orders section of the refill encounter.           Passed - Patient is age 18 or older       Passed - No active pregnancy on record       Passed - No positive pregnancy test in past 12 months        ranitidine (ZANTAC) 300 MG tablet  Last Written Prescription Date:  02/07/18  Last Fill Quantity: 90,  # refills: 1   Last office visit: 9/20/2018 with prescribing provider:  09/20/18   Future Office Visit:   Next 5 appointments (look out 90 days)     Oct 10, 2018  3:30 PM CDT   Telephone Visit with Mahin Penny   [facility] ([facility] [place])    [address]  [place] " "[id number]   [phone]               90 tablet 1     Sig: Take 1 tablet (300 mg) by mouth At Bedtime    H2 Blockers Protocol Passed    9/24/2018  1:23 PM       Passed - Patient is age 12 or older       Passed - Recent (12 mo) or future (30 days) visit within the authorizing provider's specialty    Patient had office visit in the last 12 months or has a visit in the next 30 days with authorizing provider or within the authorizing provider's specialty.  See \"Patient Info\" tab in inbasket, or \"Choose Columns\" in Meds & Orders section of the refill encounter.            montelukast (SINGULAIR) 10 MG tablet  Last Written Prescription Date:  02/07/18  Last Fill Quantity: 90,  # refills: 1   Last office visit: 9/20/2018 with prescribing provider:  09/20/18   Future Office Visit:   Next 5 appointments (look out 90 days)     Oct 10, 2018  3:30 PM CDT   Tele[facility] with Mahin Penny   [facility]    [address] [address]   [phone]               90 tablet 1     Sig: Take 1 tablet (10 mg) by mouth At Bedtime    Leukotriene Inhibitors Protocol Passed    9/24/2018  1:23 PM       Passed - Patient is age 12 or older    If patient is under 16, ok to refill using age based dosing.       Passed - Asthma control assessment score within normal limits in last 6 months    Please review ACT score.   ACT Total Scores 1/16/2018 9/20/2018   ACT TOTAL SCORE (Goal Greater than or Equal to 20) 25 22   In the past 12 months, how many times did you visit the emergency room for your asthma without being admitted to the hospital? 0 0   In the past 12 months, how many times were you hospitalized overnight because of your asthma? 0 0          Passed - Recent (6 mo) or future (30 days) visit within the authorizing provider's specialty    Patient had office visit in the last 6 months or has a visit in the next 30 days with authorizing provider or within the authorizing provider's " "specialty.  See \"Patient Info\" tab in inbasket, or \"Choose Columns\" in Meds & Orders section of the refill encounter.              "

## 2018-10-10 ENCOUNTER — VIRTUAL VISIT (OUTPATIENT)
Dept: NURSING | Facility: CLINIC | Age: 33
End: 2018-10-10

## 2018-10-10 DIAGNOSIS — Z79.4 TYPE 2 DIABETES MELLITUS WITH STAGE 3 CHRONIC KIDNEY DISEASE, WITH LONG-TERM CURRENT USE OF INSULIN (H): Primary | ICD-10-CM

## 2018-10-10 DIAGNOSIS — E11.22 TYPE 2 DIABETES MELLITUS WITH STAGE 3 CHRONIC KIDNEY DISEASE, WITH LONG-TERM CURRENT USE OF INSULIN (H): Primary | ICD-10-CM

## 2018-10-10 DIAGNOSIS — N18.30 TYPE 2 DIABETES MELLITUS WITH STAGE 3 CHRONIC KIDNEY DISEASE, WITH LONG-TERM CURRENT USE OF INSULIN (H): Primary | ICD-10-CM

## 2018-10-10 PROCEDURE — 99207 ZZC HEALTH COACHING, NO CHARGE: CPT

## 2018-10-10 NOTE — PROGRESS NOTES
October 10, 2018    [facility]  [address]  [place] [address]  [phone]  Health Coaching Progress Note    Patient Name: Divina Delgadillo Date: October 10, 2018      Session Length: 20      DATA    PRM Master Survey Scores Reviewed: Yes    Core Healthy Days Survey:         EMY Score (Last Two) 5/10/2018   EMY Raw Score 35   Activation Score 72.1   EMY Level 3       PHQ-2 Score 6/14/2018 5/10/2018   PHQ-2 Total Score Interpretation - Positive if 3 or more points; Administer PHQ-9 if positive 0 1       PHQ-9 SCORE 12/7/2017 5/23/2018   Total Score 12 2       Treatment Objective(s) Addressed in This Session:  Target Behavior(s): diet/weight loss    Current Stressors / Issues:  Divina notes no stressors or issues today.    What Patient Does Well:   Divina is now working 5 days per week and is very active at work and feels good about this.  Previous Successes:   See above. Patient was also walking with her housemates until the weather changed recently.  Areas in Need of Improvement:   Divina shares that the area in need of improvement at this time is her diet as recently she even got some cookie dough and has had it as an evening snack some nights. Divina knows this is not a good snack for her with DM and is trying to limit these behaviors. Writer affirms Divina's thoughts and encourages her not to buy these things. Divina says next time she shops she will try harder to avoid these types of snacks and purchase healthier options.  Barriers to Change:   Divina notes no specific barriers today.  Reasons for Change:   Divina is thinking about getting a tattoo and we discuss perhaps this could be as a reward for not having unhealthy snacks and keeping her BG readings under 200 for a couple of weeks or something like this. Divina notes that this could be motivation for her and says she may try this strategy.  Plan/Goal for the Next 4 Weeks:     GOAL #1: Continue  doing something active or exercising most days-target goal of 3x/week now-started new job so struggling some  GOAL #1 Progress Toward Goal: 50%  GOAL #2: Continue thinking about tobacco cessation  GOAL #2 Progress Toward Goal: 25%    Intervention:  Motivational Interviewing    MI Intervention: Expressed Empathy/Understanding, Supported Autonomy, Collaboration, Evocation, Permission to raise concern or advise, Open-ended questions, Reflections: simple and complex, Change talk (evoked) and Reframe     Change Talk Expressed by the Patient: Desire to change Ability to change Reasons to change Committment to change Activation    Provider Response to Change Talk: E - Evoked more info from patient about behavior change, A - Affirmed patient's thoughts, decisions, or attempts at behavior change, R - Reflected patient's change talk and S - Summarized patient's change talk statements    Assessment / Progress on Treatment Objective(s) / Homework:    Minimal progress - PREPARATION (Decided to change - considering how); Intervened by negotiating a change plan and determining options / strategies for behavior change, identifying triggers, exploring social supports, and working towards setting a date to begin behavior change         Plan: (Homework, other):  Patient was encouraged to continue to seek condition-related information and education, as well as schedule a follow up appointment with the Health  in 4 weeks. Patient has set self-identified goals and will monitor progress until the next appointment.  Next visit scheduled for November 7 at 3:30PM.      Mahin Penny

## 2018-10-10 NOTE — MR AVS SNAPSHOT
After Visit Summary   10/10/2018    Divina Delgadillo    MRN: [id number]           Patient Information     Date Of Birth          1985        Visit Information        Provider Department      10/10/2018 3:30 PM Mahin Penny        Today's Diagnoses     Type 2 diabetes mellitus with stage 3 chronic kidney disease, with long-term current use of insulin (H)    -  1       Follow-ups after your visit        Follow-up notes from your care team     Return in 4 weeks (on 11/7/2018).      Your next 10 appointments already scheduled     Nov 05, 2018  3:00 PM CST   Diabetes Education with [place] [facility]   [facility] ([facility])    [address] [address]   [phone]            Jan 14, 2019 10:00 AM CST   LAB with LAB FIRST FLOOR [facility] ([facility])    [address] [place] [id number] [phone]           Please do not eat 10-12 hours before your appointment if you are coming in fasting for labs on lipids, cholesterol, or glucose (sugar). This does not apply to pregnant women. Water, hot tea and black coffee (with nothing added) are okay. Do not drink other fluids, diet soda or chew gum.            Jan 14, 2019 10:30 AM CST   Return Visit with Sánchez Dias MD   [facility] ([facility])    [address] [place] [id number] [phone]              Who to contact     If you have questions or need follow up information about today's clinic visit or your schedule please contact [place] directly at [phone].  Normal or non-critical lab and imaging results will be communicated to you by MyChart, letter or phone within 4 business days after the clinic has received the results. If you do not hear from us within 7 days, please contact the clinic through MyChart or phone. If you have a  critical or abnormal lab result, we will notify you by phone as soon as possible.  Submit refill requests through [employer] or call your pharmacy and they will forward the refill request to us. Please allow 3 business days for your refill to be completed.          Additional Information About Your Visit        Care EveryWhere ID     This is your Care EveryWhere ID. This could be used by other organizations to access your [place] medical records  [id number]         Blood Pressure from Last 3 Encounters:   09/20/18 124/58   08/13/18 142/57   08/01/18 137/45    Weight from Last 3 Encounters:   09/20/18 195 lb (88.5 kg)   08/13/18 192 lb (87.1 kg)   08/01/18 195 lb 9.6 oz (88.7 kg)              Today, you had the following     No orders found for display       Primary Care Provider Office Phone # Fax #    Jaleesa Velasquez VERONIQUE [phone] [address] [facility] [address]        Goals        General    Problem Solving (pt-stated)     Notes - Note created  8/27/2018  3:52 PM by Dolly Riley RD    Goal Statement: add in pre dinner blood sugar check M-W-[id number]  Measure of Success: review of blood sugar logs in 1 month  Supportive Steps to Achieve:  Highlight days to check before dinner   Barriers: remembering  Strengths: You already are checking your blood sugar very well   Date to Achieve By: 1 month, next visit   Patient expressed understanding of goal: yes            Equal Access to Services     [facility]: Hadii juan manuel Santiago, waaxda luqadaha, qaybta kaalmada alejo, aaron munoz. So [facility] [phone].    ATENCIÓN: Si habla español, tiene a gil disposición servicios gratuitos de asistencia lingüística. Llame al [phone].    We comply with applicable federal civil rights laws and [place] laws. We do not discriminate on the basis of race, color, national origin, age, disability, sex, sexual orientation, or gender identity.             Thank you!     Thank you for choosing [facility]  for your care. Our goal is always to provide you with excellent care. Hearing back from our patients is one way we can continue to improve our services. Please take a few minutes to complete the written survey that you may receive in the mail after your visit with us. Thank you!             Your Updated Medication List - Protect others around you: Learn how to safely use, store and throw away your medicines at www.disposemymeds.org.          This list is accurate as of 10/10/18  4:01 PM.  Always use your most recent med list.                   Brand Name Dispense Instructions for use Diagnosis    ABILIFY 30 MG tablet   Generic drug:  ARIPiprazole      Take 30 mg by mouth daily        albuterol 108 (90 Base) MCG/ACT inhaler    PROAIR HFA/PROVENTIL HFA/VENTOLIN HFA    1 Inhaler    Inhale 2 puffs into the lungs every 6 hours as needed for shortness of breath / dyspnea or wheezing    Mild intermittent asthma with acute exacerbation       amLODIPine 5 MG tablet    NORVASC    90 tablet    Take 1 tablet (5 mg) by mouth daily    Essential hypertension       amoxicillin-clavulanate 875-125 MG per tablet    AUGMENTIN    14 tablet    Take 1 tablet by mouth 2 times daily    Right acute suppurative otitis media       blood glucose monitoring lancets     100 each    Use to test blood sugar 3 times daily    Type 2 diabetes mellitus with stage 3 chronic kidney disease, with long-term current use of insulin (H)       blood glucose monitoring test strip    no brand specified    100 each    1 strip by In Vitro route 3 times daily    Type 2 diabetes mellitus with stage 3 chronic kidney disease, with long-term current use of insulin (H)       chlorthalidone 25 MG tablet    HYGROTON     Take 12.5 mg by mouth        Clozapine 200 MG tablet    CLOZARIL     Take 200 mg by mouth 2 times daily        continuous blood glucose monitoring sensor     3 each    For use with Freestyle  Yash Flash  for continuous monitioring of blood glucose levels. Replace sensor every 10 days.    Type 2 diabetes mellitus with stage 3 chronic kidney disease, with long-term current use of insulin (H)       dulaglutide 1.5 MG/0.5ML pen    TRULICITY    6 mL    Inject 1.5 mg Subcutaneous every 7 days    Type 2 diabetes mellitus with stage 3 chronic kidney disease, without long-term current use of insulin (H)       FLUoxetine 20 MG capsule    PROzac     Take 20 mg by mouth daily        FREESTYLE YASH READER Nakia     1 Device    1 Device 3 times daily as needed    Type 2 diabetes mellitus with stage 3 chronic kidney disease, with long-term current use of insulin (H)       insulin glargine 100 UNIT/ML injection    LANTUS SOLOSTAR    15 mL    Inject 19 units daily    Type 2 diabetes mellitus with stage 3 chronic kidney disease, with long-term current use of insulin (H)       lamoTRIgine 25 MG tablet    LaMICtal     Take 1 tablet by mouth at bedtime x2 weeks, then 2 tablets at bedtime thereafter        lisinopril 5 MG tablet    PRINIVIL/ZESTRIL    90 tablet    Take 1 tablet (5 mg) by mouth daily    Essential hypertension       loratadine 10 MG tablet    CLARITIN    90 tablet    Take 1 tablet (10 mg) by mouth every morning    Chronic seasonal allergic rhinitis, unspecified trigger       montelukast 10 MG tablet    SINGULAIR    90 tablet    Take 1 tablet (10 mg) by mouth At Bedtime    Mild intermittent asthma with acute exacerbation       omeprazole 20 MG CR capsule    priLOSEC    60 capsule    Take 1 capsule (20 mg) by mouth daily 1 capsule bid    Gastroesophageal reflux disease without esophagitis       ranitidine 300 MG tablet    ZANTAC    90 tablet    Take 1 tablet (300 mg) by mouth At Bedtime    Gastroesophageal reflux disease without esophagitis       simvastatin 20 MG tablet    ZOCOR     Take 20 mg by mouth        Sod Fluoride-Potassium Nitrate 1.1-5 % Pste           ULTICARE MINI 31G X 6 MM   Generic drug:   insulin pen needle      AS DIRECTED WITH LANTUS PENS        ULTILET SHARPS CONTAINER 1QT Misc     1 each    1 Device as needed    Type 2 diabetes mellitus with stage 3 chronic kidney disease, without long-term current use of insulin (H)

## 2018-10-15 DIAGNOSIS — K21.9 GASTROESOPHAGEAL REFLUX DISEASE WITHOUT ESOPHAGITIS: ICD-10-CM

## 2018-10-15 NOTE — TELEPHONE ENCOUNTER
"Patient's caregiver is saying Omeprazole should be once daily instead of twice daily.    Requested Prescriptions   Pending Prescriptions Disp Refills     omeprazole (PRILOSEC) 20 MG CR capsule  Last Written Prescription Date:  04/24/18  Last Fill Quantity: 60,  # refills: 5   Last office visit: 9/20/2018 with prescribing provider:  09/20/18   Future Office Visit:   Next 5 appointments (look out 90 days)     Nov 07, 2018  3:30 PM CST   Telephone Visit with Mahin Penny   [facility] ([facility])    [address] [facility] [address] [facility] [id number] [phone]               60 capsule 5     Sig: Take 1 capsule (20 mg) by mouth daily 1 capsule bid    PPI Protocol Passed    10/15/2018  2:06 PM       Passed - Not on Clopidogrel (unless Pantoprazole ordered)       Passed - No diagnosis of osteoporosis on record       Passed - Recent (12 mo) or future (30 days) visit within the authorizing provider's specialty    Patient had office visit in the last 12 months or has a visit in the next 30 days with authorizing provider or within the authorizing provider's specialty.  See \"Patient Info\" tab in inbasket, or \"Choose Columns\" in Meds & Orders section of the refill encounter.           Passed - Patient is age 18 or older       Passed - No active pregnacy on record       Passed - No positive pregnancy test in past 12 months          "

## 2018-10-16 NOTE — TELEPHONE ENCOUNTER
Routing refill request to provider for review/approval because:  Caregiver saying this should be once daily, the script is ordered for twice daily  Medication Detail      Disp Refills Start End KVNG   omeprazole (PRILOSEC) 20 MG CR capsule 60 capsule 5 4/24/2018  No   Sig - Route: Take 1 capsule (20 mg) by mouth daily 1 capsule bid - Oral   Class: E-Prescribe   Order: [id number]   E-Prescribing Status: Receipt confirmed by pharmacy (4/24/2018  9:56 AM CDT)       Ramona CHEN RN

## 2018-10-18 DIAGNOSIS — F25.0 SCHIZOAFFECTIVE DISORDER, BIPOLAR TYPE (H): ICD-10-CM

## 2018-10-18 DIAGNOSIS — Z79.899 MEDICATION MANAGEMENT: ICD-10-CM

## 2018-10-18 LAB
BASOPHILS # BLD AUTO: 0.1 10E9/L (ref 0–0.2)
BASOPHILS NFR BLD AUTO: 0.6 %
DIFFERENTIAL METHOD BLD: ABNORMAL
EOSINOPHIL # BLD AUTO: 0.3 10E9/L (ref 0–0.7)
EOSINOPHIL NFR BLD AUTO: 1.6 %
ERYTHROCYTE [DISTWIDTH] IN BLOOD BY AUTOMATED COUNT: 14.2 % (ref 10–15)
HCT VFR BLD AUTO: 36.9 % (ref 35–47)
HGB BLD-MCNC: 11.9 G/DL (ref 11.7–15.7)
LYMPHOCYTES # BLD AUTO: 5.6 10E9/L (ref 0.8–5.3)
LYMPHOCYTES NFR BLD AUTO: 32.9 %
MCH RBC QN AUTO: 31 PG (ref 26.5–33)
MCHC RBC AUTO-ENTMCNC: 32.2 G/DL (ref 31.5–36.5)
MCV RBC AUTO: 96 FL (ref 78–100)
MONOCYTES # BLD AUTO: 1.3 10E9/L (ref 0–1.3)
MONOCYTES NFR BLD AUTO: 7.8 %
NEUTROPHILS # BLD AUTO: 9.7 10E9/L (ref 1.6–8.3)
NEUTROPHILS NFR BLD AUTO: 57.1 %
PLATELET # BLD AUTO: 315 10E9/L (ref 150–450)
RBC # BLD AUTO: 3.84 10E12/L (ref 3.8–5.2)
WBC # BLD AUTO: 17 10E9/L (ref 4–11)

## 2018-10-18 PROCEDURE — 85025 COMPLETE CBC W/AUTO DIFF WBC: CPT | Performed by: CLINICAL NURSE SPECIALIST

## 2018-10-18 PROCEDURE — 36415 COLL VENOUS BLD VENIPUNCTURE: CPT | Performed by: CLINICAL NURSE SPECIALIST

## 2018-11-05 ENCOUNTER — ALLIED HEALTH/NURSE VISIT (OUTPATIENT)
Dept: EDUCATION SERVICES | Facility: CLINIC | Age: 33
End: 2018-11-05
Payer: MEDICARE

## 2018-11-05 ENCOUNTER — OFFICE VISIT (OUTPATIENT)
Dept: FAMILY MEDICINE | Facility: CLINIC | Age: 33
End: 2018-11-05
Payer: MEDICARE

## 2018-11-05 VITALS
WEIGHT: 196 LBS | HEART RATE: 100 BPM | SYSTOLIC BLOOD PRESSURE: 138 MMHG | OXYGEN SATURATION: 98 % | BODY MASS INDEX: 33.46 KG/M2 | DIASTOLIC BLOOD PRESSURE: 46 MMHG | HEIGHT: 64 IN | TEMPERATURE: 98.6 F | RESPIRATION RATE: 16 BRPM

## 2018-11-05 DIAGNOSIS — Z79.4 TYPE 2 DIABETES MELLITUS WITH STAGE 3 CHRONIC KIDNEY DISEASE, WITH LONG-TERM CURRENT USE OF INSULIN (H): Primary | ICD-10-CM

## 2018-11-05 DIAGNOSIS — H66.93 RECURRENT AOM (ACUTE OTITIS MEDIA) OF BOTH EARS: Primary | ICD-10-CM

## 2018-11-05 DIAGNOSIS — H90.3 BILATERAL SENSORINEURAL HEARING LOSS: ICD-10-CM

## 2018-11-05 DIAGNOSIS — N18.30 TYPE 2 DIABETES MELLITUS WITH STAGE 3 CHRONIC KIDNEY DISEASE, WITH LONG-TERM CURRENT USE OF INSULIN (H): Primary | ICD-10-CM

## 2018-11-05 DIAGNOSIS — E11.22 TYPE 2 DIABETES MELLITUS WITH STAGE 3 CHRONIC KIDNEY DISEASE, WITH LONG-TERM CURRENT USE OF INSULIN (H): Primary | ICD-10-CM

## 2018-11-05 PROCEDURE — 99213 OFFICE O/P EST LOW 20 MIN: CPT | Performed by: NURSE PRACTITIONER

## 2018-11-05 PROCEDURE — G0108 DIAB MANAGE TRN  PER INDIV: HCPCS

## 2018-11-05 RX ORDER — AZITHROMYCIN 250 MG/1
TABLET, FILM COATED ORAL
Qty: 6 TABLET | Refills: 0 | Status: SHIPPED | OUTPATIENT
Start: 2018-11-05 | End: 2018-11-14

## 2018-11-05 RX ORDER — CEFUROXIME AXETIL 500 MG/1
500 TABLET ORAL 2 TIMES DAILY
Qty: 20 TABLET | Refills: 0 | Status: SHIPPED | OUTPATIENT
Start: 2018-11-05 | End: 2018-11-27

## 2018-11-05 NOTE — PROGRESS NOTES
SUBJECTIVE:   Divina Delgadillo is a 33 year old female who presents to clinic today for the following health issues:      ENT Symptoms             Symptoms: cc Present Absent Comment   Fever/Chills   x    Fatigue  x     Muscle Aches   x    Eye Irritation   x    Sneezing   x    Nasal Jose L/Drg   x    Sinus Pressure/Pain  x     Loss of smell   x    Dental pain   x    Sore Throat   x    Swollen Glands   x    Ear Pain/Fullness x x  Pressure in both ears, hard to hear   Cough   x    Wheeze   x    Chest Pain   x    Shortness of breath   x    Rash   x    Other    Dry mouth     Symptom duration:  5 1/2 weeks, dx with ear infection and patient states that she came to recheck and it had resolved but it feels worse   Symptom severity:  moderate   Treatments tried:  none   Contacts:  none       Problem list and histories reviewed & adjusted, as indicated.  Additional history: as documented    Patient Active Problem List   Diagnosis     Esophageal reflux     non alcoholic fatty liver disease     HTN (hypertension)     HYPERLIPIDEMIA LDL GOAL <100     DVT (deep venous thrombosis) (H)     CKD (chronic kidney disease) stage 3, GFR 30-59 ml/min (H)     Malignant neoplasm of pancreas, unspecified location of malignancy (H)     Type 2 diabetes mellitus with stage 3 chronic kidney disease, with long-term current use of insulin (H)     Bipolar disorder (H)     Cervical high risk HPV (human papillomavirus) test positive     IUD (intrauterine device) in place     Morbid obesity (H)     Mild intermittent asthma with acute exacerbation     Nicotine abuse     Past Surgical History:   Procedure Laterality Date     ADRENALECTOMY       PANCREATECTOMY PARTIAL       SPLENECTOMY       SURGICAL HISTORY OF -   10/1/2000    Tympanoplasty     SURGICAL HISTORY OF -   10/1/2000    Tonsillectomy       Social History   Substance Use Topics     Smoking status: Former Smoker     Packs/day: 1.00     Years: 4.00     Types: Cigarettes     Smokeless tobacco:  "Never Used      Comment: 15 cig/day.  I strongly emphasized the importance of quitting smoking.     Alcohol use No     Family History   Problem Relation Age of Onset     Respiratory Mother      ASTHMA     Allergies Mother      Depression Mother      HEART DISEASE Father      HEART VALVE REPLACEMENT     Hypertension Father      Diabetes Father      Depression Father      Respiratory Brother      Allergies Brother      Respiratory Sister      Allergies Sister      Depression Sister      Respiratory Sister      Allergies Sister      Depression Sister      KIDNEY DISEASE No family hx of            Reviewed and updated as needed this visit by clinical staff  Tobacco  Allergies  Meds  Med Hx  Surg Hx  Fam Hx  Soc Hx      Reviewed and updated as needed this visit by Provider         ROS:  Constitutional, HEENT, cardiovascular, pulmonary, gi and gu systems are negative, except as otherwise noted.    OBJECTIVE:     /46 (BP Location: Left arm, Patient Position: Sitting, Cuff Size: Adult Regular)  Pulse 100  Temp 98.6  F (37  C) (Tympanic)  Resp 16  Ht 5' 4\" (1.626 m)  Wt 196 lb (88.9 kg)  SpO2 98%  BMI 33.64 kg/m2  Body mass index is 33.64 kg/(m^2).  GENERAL: healthy, alert and no distress  EYES: Eyes grossly normal to inspection, PERRL and conjunctivae and sclerae normal  HENT: normal cephalic/atraumatic, both ears: erythematous, bulging membrane and mucopurulent effusion, nose and mouth without ulcers or lesions, oropharynx clear, oral mucous membranes moist and sinuses: maxillary tenderness on both sides  NECK: no adenopathy, no asymmetry, masses, or scars and thyroid normal to palpation  RESP: lungs clear to auscultation - no rales, rhonchi or wheezes  CV: regular rates and rhythm, normal S1 S2, no S3 or S4 and grade 2/6 systolic murmur heard best over the LUSB  SKIN: no suspicious lesions or rashes  NEURO: Normal strength and tone, mentation intact and speech normal    Diagnostic Test Results:  none "     ASSESSMENT/PLAN:       ICD-10-CM    1. Recurrent AOM (acute otitis media) of both ears H66.93 OTOLARYNGOLOGY REFERRAL     azithromycin (ZITHROMAX) 250 MG tablet     cefuroxime (CEFTIN) 500 MG tablet   2. Bilateral sensorineural hearing loss H90.3 OTOLARYNGOLOGY REFERRAL       CONSULTATION/REFERRAL to ENT- recurrent AOM and hearing loss    FUTURE APPOINTMENTS:       - Follow up in 1 week for persistent symptoms, sooner for new or worsening symptoms.     Patient Instructions     Middle Ear Infection (Adult)  You have an infection of the middle ear, the space behind the eardrum. This is also called acute otitis media (AOM). Sometimes it is caused by the common cold. This is because congestion can block the internal passage (eustachian tube) that drains fluid from the middle ear. When the middle ear fills with fluid, bacteria can grow there and cause an infection. Oral antibiotics are used to treat this illness, not ear drops. Symptoms usually start to improve within 1 to 2 days of treatment.    Home care  The following are general care guidelines:    Finish all of the antibiotic medicine given, even though you may feel better after the first few days.    You may use over-the-counter medicine, such as acetaminophen or ibuprofen, to control pain and fever, unless something else was prescribed. If you have chronic liver or kidney disease or have ever had a stomach ulcer or gastrointestinal bleeding, talk with your healthcare provider before using these medicines. Do not give aspirin to anyone under 18 years of age who has a fever. It may cause severe illness or death.  Follow-up care  Follow up with your healthcare provider, or as advised, in 2 weeks if all symptoms have not gotten better, or if hearing doesn't go back to normal within 1 month.  When to seek medical advice  Call your healthcare provider right away if any of these occur:    Ear pain gets worse or does not improve after 3 days of treatment    Unusual  drowsiness or confusion    Neck pain, stiff neck, or headache    Fluid or blood draining from the ear canal    Fever of 100.4 F (38 C) or as advised     Seizure  Date Last Reviewed: 6/1/2016 2000-2017 [employer]. [address] [place] [address]. All rights reserved. This information is not intended as a substitute for professional medical care. Always follow your healthcare professional's instructions.            VERONIQUE [facility]

## 2018-11-05 NOTE — PATIENT INSTRUCTIONS
Brining extra foods to work -   Sweet and salty mix (measure out)   Apple and cheese   Cucumbers & hummus  String cheese       T[employer] - dollar store     Dolly Riley RD, LD, CDE  For Diabetes Education appointments call: [phone]   For Diabetes Education Related Questions call: [phone] or email: [email or web address]

## 2018-11-05 NOTE — MR AVS SNAPSHOT
After Visit Summary   11/5/2018    Divina Delgadillo    MRN: [id number]           Patient Information     Date Of Birth          1985        Visit Information        Provider Department      11/5/2018 2:40 PM Sue Perez APRN [facility]        Today's Diagnoses     Recurrent AOM (acute otitis media) of both ears    -  1    Bilateral sensorineural hearing loss          Care Instructions      Middle Ear Infection (Adult)  You have an infection of the middle ear, the space behind the eardrum. This is also called acute otitis media (AOM). Sometimes it is caused by the common cold. This is because congestion can block the internal passage (eustachian tube) that drains fluid from the middle ear. When the middle ear fills with fluid, bacteria can grow there and cause an infection. Oral antibiotics are used to treat this illness, not ear drops. Symptoms usually start to improve within 1 to 2 days of treatment.    Home care  The following are general care guidelines:    Finish all of the antibiotic medicine given, even though you may feel better after the first few days.    You may use over-the-counter medicine, such as acetaminophen or ibuprofen, to control pain and fever, unless something else was prescribed. If you have chronic liver or kidney disease or have ever had a stomach ulcer or gastrointestinal bleeding, talk with your healthcare provider before using these medicines. Do not give aspirin to anyone under 18 years of age who has a fever. It may cause severe illness or death.  Follow-up care  Follow up with your healthcare provider, or as advised, in 2 weeks if all symptoms have not gotten better, or if hearing doesn't go back to normal within 1 month.  When to seek medical advice  Call your healthcare provider right away if any of these occur:    Ear pain gets worse or does not improve after 3 days of treatment    Unusual drowsiness or confusion    Neck pain, stiff  neck, or headache    Fluid or blood draining from the ear canal    Fever of 100.4 F (38 C) or as advised     Seizure  Date Last Reviewed: 6/1/2016 2000-2017 [employer]). [address] [place] [address]. All rights reserved. This information is not intended as a substitute for professional medical care. Always follow your healthcare professional's instructions.                Follow-ups after your visit        Additional Services     OTOLARYNGOLOGY REFERRAL       Your provider has referred you to: [facility]: [facility] [phone]   [email or web address]    Please be aware that coverage of these services is subject to the terms and limitations of your health insurance plan.  Call member services at your health plan with any benefit or coverage questions.      Please bring the following with you to your appointment:    (1) Any X-Rays, CTs or MRIs which have been performed.  Contact the facility where they were done to arrange for  prior to your scheduled appointment.   (2) List of current medications  (3) This referral request   (4) Any documents/labs given to you for this referral                  Your next 10 appointments already scheduled     Nov 07, 2018  3:30 PM CST   Telephone Visit with Mahin Penny   [facility] ([facility])    [address] [facility] [address] [phone]           Note: this is not an onsite visit; there is no need to come to the facility.            Jan 14, 2019 10:00 AM CST   LAB with LAB FIRST FLOOR [facility] ([facility])    [address] [place] [address] [phone]           Please do not eat 10-12 hours before your appointment if you are coming in fasting for labs on lipids, cholesterol, or glucose (sugar). This does not apply to pregnant women. Water, hot tea and black coffee (with nothing added) are okay. Do  "not drink other fluids, diet soda or chew gum.            Jan 14, 2019 10:30 AM CST   Return Visit with Sánchez Dias MD   [facility] ([facility]    [address] [address] [place] [address] [phone]              Who to contact     If you have questions or need follow up information about today's clinic visit or your schedule please contact [facility] directly at [phone].  Normal or non-critical lab and imaging results will be communicated to you by MyChart, letter or phone within 4 business days after the clinic has received the results. If you do not hear from us within 7 days, please contact the clinic through MyChart or phone. If you have a critical or abnormal lab result, we will notify you by phone as soon as possible.  Submit refill requests through [employer] or call your pharmacy and they will forward the refill request to us. Please allow 3 business days for your refill to be completed.          Additional Information About Your Visit        Care EveryWhere ID     This is your Care EveryWhere ID. This could be used by other organizations to access your [place] medical records  [id number]        Your Vitals Were     Pulse Temperature Respirations Height Pulse Oximetry BMI (Body Mass Index)    100 98.6  F (37  C) (Tympanic) 16 5' 4\" (1.626 m) 98% 33.64 kg/m2       Blood Pressure from Last 3 Encounters:   11/05/18 138/46   09/20/18 124/58   08/13/18 142/57    Weight from Last 3 Encounters:   11/05/18 196 lb (88.9 kg)   09/20/18 195 lb (88.5 kg)   08/13/18 192 lb (87.1 kg)              We Performed the Following     OTOLARYNGOLOGY REFERRAL          Today's Medication Changes          These changes are accurate as of 11/5/18  3:03 PM.  If you have any questions, ask your nurse or doctor.               Start taking these medicines.        Dose/Directions    azithromycin 250 MG tablet   Commonly known as:  ZITHROMAX   Used for:  Recurrent " AOM (acute otitis media) of both ears   Started by:  Sue Perez APRN CNP        Two tablets first day, then one tablet daily for four days.   Quantity:  6 tablet   Refills:  0       cefuroxime 500 MG tablet   Commonly known as:  CEFTIN   Used for:  Recurrent AOM (acute otitis media) of both ears   Started by:  Sue Perez APRN CNP        Dose:  500 mg   Take 1 tablet (500 mg) by mouth 2 times daily   Quantity:  20 tablet   Refills:  0            Where to get your medicines      These medications were sent to [facility] - - [place] - [id number] [facility]  [id number] [place] [address]    Hours:  [facility] [place] [facility] Phone:  [phone]     azithromycin 250 MG tablet    cefuroxime 500 MG tablet                Primary Care Provider Office Phone # Fax #    Jaleesa Watson VERONIQUE Velasquez [phone] [address] [facility] [address]        Goals        General    Problem Solving (pt-stated)     Notes - Note created  8/27/2018  3:52 PM by Dolly Riley RD    Goal Statement: add in pre dinner blood sugar check M-W-F  Measure of Success: review of blood sugar logs in 1 month  Supportive Steps to Achieve:  Highlight days to check before dinner   Barriers: remembering  Strengths: You already are checking your blood sugar very well   Date to Achieve By: 1 month, next visit   Patient expressed understanding of goal: yes            Equal Access to Services     [facility] AH: Hadii juan manuel martinez Sotrace, waaxda luqadaha, qaybta kaalmada nehayada, aaron munoz. So [facility] [phone].    ATENCIÓN: Si habla español, tiene a gil disposición servicios gratuitos de asistencia lingüística. Llame al [phone].    We comply with applicable federal civil rights laws and [place] laws. We do not discriminate on the basis of race, color, national origin, age, disability, sex, sexual orientation, or gender  identity.            Thank you!     Thank you for choosing [facility]  for your care. Our goal is always to provide you with excellent care. Hearing back from our patients is one way we can continue to improve our services. Please take a few minutes to complete the written survey that you may receive in the mail after your visit with us. Thank you!             Your Updated Medication List - Protect others around you: Learn how to safely use, store and throw away your medicines at www.disposemymeds.org.          This list is accurate as of 11/5/18  3:03 PM.  Always use your most recent med list.                   Brand Name Dispense Instructions for use Diagnosis    ABILIFY 30 MG tablet   Generic drug:  ARIPiprazole      Take 30 mg by mouth daily        albuterol 108 (90 Base) MCG/ACT inhaler    PROAIR HFA/PROVENTIL HFA/VENTOLIN HFA    1 Inhaler    Inhale 2 puffs into the lungs every 6 hours as needed for shortness of breath / dyspnea or wheezing    Mild intermittent asthma with acute exacerbation       amLODIPine 5 MG tablet    NORVASC    90 tablet    Take 1 tablet (5 mg) by mouth daily    Essential hypertension       azithromycin 250 MG tablet    ZITHROMAX    6 tablet    Two tablets first day, then one tablet daily for four days.    Recurrent AOM (acute otitis media) of both ears       blood glucose monitoring lancets     100 each    Use to test blood sugar 3 times daily    Type 2 diabetes mellitus with stage 3 chronic kidney disease, with long-term current use of insulin (H)       blood glucose monitoring test strip    no brand specified    100 each    1 strip by In Vitro route 3 times daily    Type 2 diabetes mellitus with stage 3 chronic kidney disease, with long-term current use of insulin (H)       cefuroxime 500 MG tablet    CEFTIN    20 tablet    Take 1 tablet (500 mg) by mouth 2 times daily    Recurrent AOM (acute otitis media) of both ears       chlorthalidone 25 MG tablet    HYGROTON      Take 12.5 mg by mouth        Clozapine 200 MG tablet    CLOZARIL     Take 200 mg by mouth 2 times daily        continuous blood glucose monitoring sensor     3 each    For use with Freestyle Yash Flash  for continuous monitioring of blood glucose levels. Replace sensor every 10 days.    Type 2 diabetes mellitus with stage 3 chronic kidney disease, with long-term current use of insulin (H)       dulaglutide 1.5 MG/0.5ML pen    TRULICITY    6 mL    Inject 1.5 mg Subcutaneous every 7 days    Type 2 diabetes mellitus with stage 3 chronic kidney disease, without long-term current use of insulin (H)       FLUoxetine 20 MG capsule    PROzac     Take 20 mg by mouth daily        FREESTYLE YASH READER Nakia     1 Device    1 Device 3 times daily as needed    Type 2 diabetes mellitus with stage 3 chronic kidney disease, with long-term current use of insulin (H)       insulin glargine 100 UNIT/ML injection    LANTUS SOLOSTAR    15 mL    Inject 19 units daily    Type 2 diabetes mellitus with stage 3 chronic kidney disease, with long-term current use of insulin (H)       lamoTRIgine 25 MG tablet    LaMICtal     Take 1 tablet by mouth at bedtime x2 weeks, then 2 tablets at bedtime thereafter        lisinopril 5 MG tablet    PRINIVIL/ZESTRIL    90 tablet    Take 1 tablet (5 mg) by mouth daily    Essential hypertension       loratadine 10 MG tablet    CLARITIN    90 tablet    Take 1 tablet (10 mg) by mouth every morning    Chronic seasonal allergic rhinitis, unspecified trigger       montelukast 10 MG tablet    SINGULAIR    90 tablet    Take 1 tablet (10 mg) by mouth At Bedtime    Mild intermittent asthma with acute exacerbation       omeprazole 20 MG CR capsule    priLOSEC    60 capsule    Take 1 capsule (20 mg) by mouth daily    Gastroesophageal reflux disease without esophagitis       ranitidine 300 MG tablet    ZANTAC    90 tablet    Take 1 tablet (300 mg) by mouth At Bedtime    Gastroesophageal reflux disease without  esophagitis       simvastatin 20 MG tablet    ZOCOR     Take 20 mg by mouth        Sod Fluoride-Potassium Nitrate 1.1-5 % Pste           ULTICARE MINI 31G X 6 MM   Generic drug:  insulin pen needle      AS DIRECTED WITH LANTUS PENS        ULTILET SHARPS CONTAINER 1QT Misc     1 each    1 Device as needed    Type 2 diabetes mellitus with stage 3 chronic kidney disease, without long-term current use of insulin (H)

## 2018-11-05 NOTE — PROGRESS NOTES
"Diabetes Self-Management Education & Support    Diabetes Education Self Management & Training    SUBJECTIVE/OBJECTIVE:  Presents for: Follow-up  Accompanied by: Self, Other  Diabetes education in the past 24mo: Yes  Focus of Visit: Healthy Eating  Diabetes type: Type 2  Disease course: Stable  Transportation concerns: Yes  Other concerns:: None  Cultural Influences/Ethnic Background:  American    Diabetes Symptoms & Complications  Symptom course: Resolved    Patient Problem List and Family Medical History reviewed for relevant medical history, current medical status, and diabetes risk factors.    Vitals:  There were no vitals taken for this visit.  Estimated body mass index is 33.64 kg/(m^2) as calculated from the following:    Height as of an earlier encounter on 11/5/18: 1.626 m (5' 4\").    Weight as of an earlier encounter on 11/5/18: 88.9 kg (196 lb).   Last 3 BP:   BP Readings from Last 3 Encounters:   11/05/18 138/46   09/20/18 124/58   08/13/18 142/57       History   Smoking Status     Former Smoker     Packs/day: 1.00     Years: 4.00     Types: Cigarettes   Smokeless Tobacco     Never Used     Comment: 15 cig/day.  I strongly emphasized the importance of quitting smoking.       Labs:  Lab Results   Component Value Date    A1C 7.2 06/21/2018     Lab Results   Component Value Date    GLC 87 07/25/2018     Lab Results   Component Value Date     06/21/2018     HDL Cholesterol   Date Value Ref Range Status   06/21/2018 57 >49 mg/dL Final   ]  GFR Estimate   Date Value Ref Range Status   07/25/2018 36 (L) >60 mL/min/1.7m2 Final     Comment:     Non  GFR Calc     GFR Estimate If Black   Date Value Ref Range Status   07/25/2018 44 (L) >60 mL/min/1.7m2 Final     Comment:      GFR Calc     Lab Results   Component Value Date    CR 1.65 07/25/2018     No results found for: MICROALBUMIN    Healthy Eating  Healthy Eating Assessed Today: Yes  Meal planning: Carbohydrate counting, " Smaller portions  Meals include: Breakfast, Lunch, Dinner, Snacks  Breakfast - 2 packets oatmeal OR toast toast, OR waffles   Lunch - sandwiches + small chips    Dinner - chicken and baked potato and green beans    Snacks - occasional banana or apple     Being Active  Being Active Assessed Today: Yes (staff and foster mother have offered ride to [employer]; is going to try to start going more)  Barrier to exercise: Time, None    Monitoring  Monitoring Assessed Today: Yes  Did patient bring glucose meter to appointment? : No  Home Glucose (Sugar) Monitoring: 3-4 times per day  Overall Range (mg/dL): 130-140    BG results: fastin, 163, 109, 163, 21, 150, 125, 120, 113, 127, 130, 112, 139, 173, 205, 200, 190 (in [place]   After lunch: 249, 173, 196, 146, 170 ,119, 134, 108, 119, 153, 179, 140, 152, 14, 152, 232, 137,   After dinner: 224, 185, 235, 212, 152, 137, 136, 149, 135, 1310, 177, 145, 195, 125, 143, 203    Taking Medications  Diabetes Medication(s)     Insulin Sig    insulin glargine (LANTUS SOLOSTAR) 100 UNIT/ML pen Inject 19 units daily    Incretin Mimetic Agents (GLP-1 Receptor Agonists) Sig    dulaglutide (TRULICITY) 1.5 MG/0.5ML pen Inject 1.5 mg Subcutaneous every 7 days          Current Treatments: Insulin Injections, Oral Agent (monotherapy)    Problem Solving  Hypoglycemia Frequency: Rarely (Reports some numbers in the 70s)       Reducing Risks  Reducing Risks Assessed Today: No    Healthy Coping  Healthy Coping Assessed Today: Yes  Emotional response to diabetes: Ready to learn  Informal Support system:: Friends, Family, Other  Stage of change: PREPARATION (Decided to change - considering how)  Support resources: Websites, In-person Offerings  Patient Activation Measure Survey Score:  EMY Score (Last Two) 5/10/2018   EMY Raw Score 35   Activation Score 72.1   EMY Level 3       Goals Addressed as of 2018 at 1:40 PM       Monitoring (pt-stated)     Added 18 by Dolly Riley  JACQUES           My Goal: I will log all my meals at least 5 days a week     What I need to meet my goal: keep food logs nearby    I plan to meet my goal by this date: 30 days             Patient's most recent   Lab Results   Component Value Date    A1C 7.2 06/21/2018    is not meeting goal of <7.0    INTERVENTION:   Ordered a new A1c.  Focused on diet changes, snacks modifications, and portion sizes.  Divina is going to try food records for 1 month incorporating these new ideas.     Diabetes knowledge and skills assessment:     Patient is knowledgeable in diabetes management concepts related to: Monitoring, Taking Medication and Problem Solving    Patient needs further education on the following diabetes management concepts: Healthy Eating, Being Active and Monitoring    Based on learning assessment above, most appropriate setting for further diabetes education would be: Individual setting.    Education provided today on:  AADE Self-Care Behaviors:  Healthy Eating: consistency in amount, composition, and timing of food intake and portion control  Being Active: relationship to blood glucose  Monitoring: individual blood glucose targets and frequency of monitoring    Opportunities for ongoing education and support in diabetes-self management were discussed.  Pt verbalized understanding of concepts discussed and recommendations provided today.       Education Materials Provided:  BG Log Sheet    PLAN:  See Patient Instructions for co-developed, patient-stated behavior change goals.  AVS printed and provided to patient today. See Follow-Up section for recommended follow-up.    Dolly Riley RD, LD, CDE  Diabetes Education    Time Spent: 30 minutes  Encounter Type: Individual

## 2018-11-05 NOTE — MR AVS SNAPSHOT
After Visit Summary   11/5/2018    Divina Delgadillo    MRN: [id number]           Patient Information     Date Of Birth          1985        Visit Information        Provider Department      11/5/2018 3:00 PM [place] [facility] [place]        Today's Diagnoses     Type 2 diabetes mellitus with stage 3 chronic kidney disease, with long-term current use of insulin (H)    -  1      Care Instructions    Brining extra foods to work -   Sweet and salty mix (measure out)   Apple and cheese   Cucumbers & hummus  String cheese       [employer] - dollar store     Dolly Riley RD, LD, CDE  For Diabetes Education appointments call: [phone]   For Diabetes Education Related Questions call: [phone] or email: [email or web address]            Follow-ups after your visit        Your next 10 appointments already scheduled     Nov 07, 2018  3:30 PM CST   [facility] with Mahin [facility]   [facility]    [id number] [place] [phone]           Note: this is not an onsite visit; there is no need to come to the facility.            Dec 03, 2018  3:00 PM CST   Diabetes Education with [place] [facility] [place] ([facility])    [address] [place] [id number] [phone]            Jan 14, 2019 10:00 AM CST   LAB with LAB FIRST FLOOR [facility] ([facility])    [address] [place] [phone]           Please do not eat 10-12 hours before your appointment if you are coming in fasting for labs on lipids, cholesterol, or glucose (sugar). This does not apply to pregnant women. Water, hot tea and black coffee (with nothing added) are okay. Do not drink other fluids, diet soda or chew gum.            Jan 14, 2019 10:30 AM CST   Return Visit with Sánchez Dias MD    [facility]    [address] [place] [id number] [phone]              Future tests that were ordered for you today     Open Future Orders        Priority Expected Expires Ordered    **A1C FUTURE anytime Routine 11/5/2018 11/5/2019 11/5/2018            Who to contact     If you have questions or need follow up information about today's clinic visit or your schedule please contact [facility] [place] [facility] [place] directly at [phone].  Normal or non-critical lab and imaging results will be communicated to you by MyChart, letter or phone within 4 business days after the clinic has received the results. If you do not hear from us within 7 days, please contact the clinic through MyChart or phone. If you have a critical or abnormal lab result, we will notify you by phone as soon as possible.  Submit refill requests through [employer] or call your pharmacy and they will forward the refill request to us. Please allow 3 business days for your refill to be completed.          Additional Information About Your Visit        Care EveryWhere ID     This is your Care EveryWhere ID. This could be used by other organizations to access your [place] medical records  [id number]         Blood Pressure from Last 3 Encounters:   11/05/18 138/46   09/20/18 124/58   08/13/18 142/57    Weight from Last 3 Encounters:   11/05/18 88.9 kg (196 lb)   09/20/18 88.5 kg (195 lb)   08/13/18 87.1 kg (192 lb)                 Today's Medication Changes          These changes are accurate as of 11/5/18  3:36 PM.  If you have any questions, ask your nurse or doctor.               Start taking these medicines.        Dose/Directions    azithromycin 250 MG tablet   Commonly known as:  ZITHROMAX   Used for:  Recurrent AOM (acute otitis media) of both ears   Started by:  Sue Perez APRN CNP        Two tablets first day, then one tablet daily for four days.   Quantity:  6  tablet   Refills:  0       cefuroxime 500 MG tablet   Commonly known as:  CEFTIN   Used for:  Recurrent AOM (acute otitis media) of both ears   Started by:  Sue Perez APRN CNP        Dose:  500 mg   Take 1 tablet (500 mg) by mouth 2 times daily   Quantity:  20 tablet   Refills:  0            Where to get your medicines      These medications were sent to [facility] - - [facility] [id number] [facility]  [id number] [place] [id number]    [facility]:  [facility] Phone:  [phone]     azithromycin 250 MG tablet    cefuroxime 500 MG tablet                Primary Care Provider Office Phone # Fax #    Jaleesa Watson VERONIQUE Velasquez [phone] [address] [facility] [address]        Goals        General    Monitoring (pt-stated)     Notes - Note created  11/5/2018  3:32 PM by Dolly Riley RD    My Goal: I will log all my meals at least 5 days a week     What I need to meet my goal: keep food logs nearby    I plan to meet my goal by this date: 30 days         Problem Solving (pt-stated)     Notes - Note created  8/27/2018  3:52 PM by Dolly Riley RD    Goal Statement: add in pre dinner blood sugar check M-W-F  Measure of Success: review of blood sugar logs in 1 month  Supportive Steps to Achieve:  Highlight days to check before dinner   Barriers: remembering  Strengths: You already are checking your blood sugar very well   Date to Achieve By: 1 month, next visit   Patient expressed understanding of goal: yes            Equal Access to Services     [facility] AH: Hadii juan manuel moreno hadasho Sochelleali, waaxda luqadaha, qaybta kaalmada aaron dhillon . So [facility] [phone].    ATENCIÓN: Si habla español, tiene a gil disposición servicios gratuitos de asistencia lingüística. Llame al [phone].    We comply with applicable federal civil rights laws and [place] laws. We do not discriminate  on the basis of race, color, national origin, age, disability, sex, sexual orientation, or gender identity.            Thank you!     Thank you for choosing [facility]  for your care. Our goal is always to provide you with excellent care. Hearing back from our patients is one way we can continue to improve our services. Please take a few minutes to complete the written survey that you may receive in the mail after your visit with us. Thank you!             Your Updated Medication List - Protect others around you: Learn how to safely use, store and throw away your medicines at www.disposemymeds.org.          This list is accurate as of 11/5/18  3:36 PM.  Always use your most recent med list.                   Brand Name Dispense Instructions for use Diagnosis    ABILIFY 30 MG tablet   Generic drug:  ARIPiprazole      Take 30 mg by mouth daily        albuterol 108 (90 Base) MCG/ACT inhaler    PROAIR HFA/PROVENTIL HFA/VENTOLIN HFA    1 Inhaler    Inhale 2 puffs into the lungs every 6 hours as needed for shortness of breath / dyspnea or wheezing    Mild intermittent asthma with acute exacerbation       amLODIPine 5 MG tablet    NORVASC    90 tablet    Take 1 tablet (5 mg) by mouth daily    Essential hypertension       azithromycin 250 MG tablet    ZITHROMAX    6 tablet    Two tablets first day, then one tablet daily for four days.    Recurrent AOM (acute otitis media) of both ears       blood glucose monitoring lancets     100 each    Use to test blood sugar 3 times daily    Type 2 diabetes mellitus with stage 3 chronic kidney disease, with long-term current use of insulin (H)       blood glucose monitoring test strip    no brand specified    100 each    1 strip by In Vitro route 3 times daily    Type 2 diabetes mellitus with stage 3 chronic kidney disease, with long-term current use of insulin (H)       cefuroxime 500 MG tablet    CEFTIN    20 tablet    Take 1 tablet (500 mg) by mouth 2 times  daily    Recurrent AOM (acute otitis media) of both ears       chlorthalidone 25 MG tablet    HYGROTON     Take 12.5 mg by mouth        Clozapine 200 MG tablet    CLOZARIL     Take 200 mg by mouth 2 times daily        continuous blood glucose monitoring sensor     3 each    For use with Freestyle Yash Flash  for continuous monitioring of blood glucose levels. Replace sensor every 10 days.    Type 2 diabetes mellitus with stage 3 chronic kidney disease, with long-term current use of insulin (H)       dulaglutide 1.5 MG/0.5ML pen    TRULICITY    6 mL    Inject 1.5 mg Subcutaneous every 7 days    Type 2 diabetes mellitus with stage 3 chronic kidney disease, without long-term current use of insulin (H)       FLUoxetine 20 MG capsule    PROzac     Take 20 mg by mouth daily        FREESTYLE YASH READER Nakia     1 Device    1 Device 3 times daily as needed    Type 2 diabetes mellitus with stage 3 chronic kidney disease, with long-term current use of insulin (H)       insulin glargine 100 UNIT/ML injection    LANTUS SOLOSTAR    15 mL    Inject 19 units daily    Type 2 diabetes mellitus with stage 3 chronic kidney disease, with long-term current use of insulin (H)       lamoTRIgine 25 MG tablet    LaMICtal     Take 1 tablet by mouth at bedtime x2 weeks, then 2 tablets at bedtime thereafter        lisinopril 5 MG tablet    PRINIVIL/ZESTRIL    90 tablet    Take 1 tablet (5 mg) by mouth daily    Essential hypertension       loratadine 10 MG tablet    CLARITIN    90 tablet    Take 1 tablet (10 mg) by mouth every morning    Chronic seasonal allergic rhinitis, unspecified trigger       montelukast 10 MG tablet    SINGULAIR    90 tablet    Take 1 tablet (10 mg) by mouth At Bedtime    Mild intermittent asthma with acute exacerbation       omeprazole 20 MG CR capsule    priLOSEC    60 capsule    Take 1 capsule (20 mg) by mouth daily    Gastroesophageal reflux disease without esophagitis       ranitidine 300 MG tablet     ZANTAC    90 tablet    Take 1 tablet (300 mg) by mouth At Bedtime    Gastroesophageal reflux disease without esophagitis       simvastatin 20 MG tablet    ZOCOR     Take 20 mg by mouth        Sod Fluoride-Potassium Nitrate 1.1-5 % Pste           ULTICARE MINI 31G X 6 MM   Generic drug:  insulin pen needle      AS DIRECTED WITH LANTUS PENS        ULTILET SHARPS CONTAINER 1QT Misc     1 each    1 Device as needed    Type 2 diabetes mellitus with stage 3 chronic kidney disease, without long-term current use of insulin (H)

## 2018-11-07 ENCOUNTER — VIRTUAL VISIT (OUTPATIENT)
Dept: NURSING | Facility: CLINIC | Age: 33
End: 2018-11-07

## 2018-11-07 DIAGNOSIS — E11.22 TYPE 2 DIABETES MELLITUS WITH STAGE 3 CHRONIC KIDNEY DISEASE, WITH LONG-TERM CURRENT USE OF INSULIN (H): Primary | ICD-10-CM

## 2018-11-07 DIAGNOSIS — Z79.4 TYPE 2 DIABETES MELLITUS WITH STAGE 3 CHRONIC KIDNEY DISEASE, WITH LONG-TERM CURRENT USE OF INSULIN (H): Primary | ICD-10-CM

## 2018-11-07 DIAGNOSIS — N18.30 TYPE 2 DIABETES MELLITUS WITH STAGE 3 CHRONIC KIDNEY DISEASE, WITH LONG-TERM CURRENT USE OF INSULIN (H): Primary | ICD-10-CM

## 2018-11-07 PROCEDURE — 99207 ZZC HEALTH COACHING, NO CHARGE: CPT

## 2018-11-07 NOTE — PROGRESS NOTES
November 7, 2018    [facility]  [address] [facility]  [place] [address]  [phone]  Health Coaching Progress Note    Patient Name: Divina Delgadillo Date: November 7, 2018      Session Length: 20      DATA    PRM Master Survey Scores Reviewed: Yes    Core Healthy Days Survey:         EMY Score (Last Two) 5/10/2018   EMY Raw Score 35   Activation Score 72.1   EMY Level 3       PHQ-2 Score 6/14/2018 5/10/2018   PHQ-2 Total Score (Adult) - Positive if 3 or more points; Administer PHQ-9 if positive 0 1       PHQ-9 SCORE 12/7/2017 5/23/2018   Total Score 12 2       Treatment Objective(s) Addressed in This Session:  Target Behavior(s): diet/weight loss    Current Stressors / Issues:  Divina reports that her foster mom is still in [place] and this has been challenging.    What Patient Does Well:   Divina has still been working on her goals around diet and exercise.  Previous Successes:   Divina reports that she got a lot of exercise/walking in [place] while there on vacation at the end of October.  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 Divina hopes to go a couple times/week. Writer will follow unit(s) on this next visit.  Barriers to Change:   Divina notes no barriers at this time.  Reasons for Change:   Divina wants to lose weight for her health and self-esteem.  Plan/Goal for the Next 4 Weeks:     GOAL #1: Continue doing something active or exercising most days-target goal of 3x/week now-plans to resume  GOAL #1 Progress Toward Goal: 50%  GOAL #2: Continue thinking about tobacco cessation  GOAL #2 Progress Toward Goal: 25%    Intervention:  Motivational Interviewing    MI Intervention: Expressed Empathy/Understanding, Supported Autonomy, Collaboration, Evocation, Permission to raise concern or advise, Open-ended questions,  Reflections: simple and complex, Change talk (evoked) and Reframe     Change Talk Expressed by the Patient: Desire to change Ability to change Committment to change Activation    Provider Response to Change Talk: E - Evoked more info from patient about behavior change, A - Affirmed patient's thoughts, decisions, or attempts at behavior change, R - Reflected patient's change talk and S - Summarized patient's change talk statements    Assessment / Progress on Treatment Objective(s) / Homework:    Satisfactory progress - ACTION (Actively working towards change); Intervened by reinforcing change plan / affirming steps taken         Plan: (Homework, other):  Patient was encouraged to continue to seek condition-related information and education, as well as schedule a follow up appointment with the Health  in 4 weeks. Patient has set self-identified goals and will monitor progress until the next appointment.  Next visit scheduled for December 5 at 3:30PM.      Mahin Penny

## 2018-11-07 NOTE — MR AVS SNAPSHOT
After Visit Summary   11/7/2018    Divina Delgadillo    MRN: [id number]           Patient Information     Date Of Birth          1985        Visit Information        Provider Department      11/7/2018 3:30 PM Mahin Penny [facility] [place]        Today's Diagnoses     Type 2 diabetes mellitus with stage 3 chronic kidney disease, with long-term current use of insulin (H)    -  1       Follow-ups after your visit        Follow-up notes from your care team     Return in 4 weeks (on 12/5/2018).      Your next 10 appointments already scheduled     Nov 14, 2018  1:00 PM CST   [facility]    [address] [address]   [phone]            Nov 14, 2018  1:30 PM CST   New Visit [facility]    [address] [address]   [phone]            Dec 03, 2018  3:00 PM CST   Diabetes Education with [place] [facility]   [facility] [place] [facility] [place] ([facility])    [address] [address]   [phone]            Jan 14, 2019 10:00 AM CST   LAB with LAB FIRST FLOOR [facility] ([facility])    [address] [place] [phone]           Please do not eat 10-12 hours before your appointment if you are coming in fasting for labs on lipids, cholesterol, or glucose (sugar). This does not apply to pregnant women. Water, hot tea and black coffee (with nothing added) are okay. Do not drink other fluids, diet soda or chew gum.            Jan 14, 2019 10:30 AM CST   Return Visit with Sánchez Dias MD   [facility]    [address] [place] [phone]              Who to contact     If you have questions or need follow up information  about today's clinic visit or your schedule please contact [facility] directly at [phone].  Normal or non-critical lab and imaging results will be communicated to you by MyChart, letter or phone within 4 business days after the clinic has received the results. If you do not hear from us within 7 days, please contact the clinic through MyChart or phone. If you have a critical or abnormal lab result, we will notify you by phone as soon as possible.  Submit refill requests through [employer] or call your pharmacy and they will forward the refill request to us. Please allow 3 business days for your refill to be completed.          Additional Information About Your Visit        Care EveryWhere ID     This is your Care EveryWhere ID. This could be used by other organizations to access your [place] medical records  [id number]         Blood Pressure from Last 3 Encounters:   11/05/18 138/46   09/20/18 124/58   08/13/18 142/57    Weight from Last 3 Encounters:   11/05/18 196 lb (88.9 kg)   09/20/18 195 lb (88.5 kg)   08/13/18 192 lb (87.1 kg)              Today, you had the following     No orders found for display       Primary Care Provider Office Phone # Fax #    Jaleesa Watson VERONIQUE Velasquez [phone]       [address] [facility] [address]        Goals        General    Monitoring (pt-stated)     Notes - Note created  11/5/2018  3:32 PM by Dolly Riley RD    My Goal: I will log all my meals at least 5 days a week     What I need to meet my goal: keep food logs nearby    I plan to meet my goal by this date: 30 days         Problem Solving (pt-stated)     Notes - Note created  8/27/2018  3:52 PM by Dolly Riley RD    Goal Statement: add in pre dinner blood sugar check M-W-F  Measure of Success: review of blood sugar logs in 1 month  Supportive Steps to Achieve:  Highlight days to check before dinner   Barriers: remembering  Strengths: You already are checking your  blood sugar very well   Date to Achieve By: 1 month, next visit   Patient expressed understanding of goal: yes            Equal Access to Services     [facility] : Hadii aad ku hadyenniferlogan Santiago, waemelinada philipodilonha, qabuckta kavikida nehajacobybj, aaron dumont xeniapadmini cheathamsamanthabambi munzo. So [facility] [phone].    ATENCIÓN: Si habla español, tiene a gil disposición servicios gratuitos de asistencia lingüística. Llame al [phone].    We comply with applicable federal civil rights laws and [place] laws. We do not discriminate on the basis of race, color, national origin, age, disability, sex, sexual orientation, or gender identity.            Thank you!     Thank you for choosing [facility]  for your care. Our goal is always to provide you with excellent care. Hearing back from our patients is one way we can continue to improve our services. Please take a few minutes to complete the written survey that you may receive in the mail after your visit with us. Thank you!             Your Updated Medication List - Protect others around you: Learn how to safely use, store and throw away your medicines at www.disposemymeds.org.          This list is accurate as of 11/7/18 11:59 PM.  Always use your most recent med list.                   Brand Name Dispense Instructions for use Diagnosis    ABILIFY 30 MG tablet   Generic drug:  ARIPiprazole      Take 30 mg by mouth daily        albuterol 108 (90 Base) MCG/ACT inhaler    PROAIR HFA/PROVENTIL HFA/VENTOLIN HFA    1 Inhaler    Inhale 2 puffs into the lungs every 6 hours as needed for shortness of breath / dyspnea or wheezing    Mild intermittent asthma with acute exacerbation       amLODIPine 5 MG tablet    NORVASC    90 tablet    Take 1 tablet (5 mg) by mouth daily    Essential hypertension       azithromycin 250 MG tablet    ZITHROMAX    6 tablet    Two tablets first day, then one tablet daily for four days.    Recurrent AOM (acute otitis media) of both ears        blood glucose monitoring lancets     100 each    Use to test blood sugar 3 times daily    Type 2 diabetes mellitus with stage 3 chronic kidney disease, with long-term current use of insulin (H)       blood glucose monitoring test strip    no brand specified    100 each    1 strip by In Vitro route 3 times daily    Type 2 diabetes mellitus with stage 3 chronic kidney disease, with long-term current use of insulin (H)       cefuroxime 500 MG tablet    CEFTIN    20 tablet    Take 1 tablet (500 mg) by mouth 2 times daily    Recurrent AOM (acute otitis media) of both ears       chlorthalidone 25 MG tablet    HYGROTON     Take 12.5 mg by mouth        Clozapine 200 MG tablet    CLOZARIL     Take 200 mg by mouth 2 times daily        continuous blood glucose monitoring sensor     3 each    For use with Freestyle Yash Flash  for continuous monitioring of blood glucose levels. Replace sensor every 10 days.    Type 2 diabetes mellitus with stage 3 chronic kidney disease, with long-term current use of insulin (H)       dulaglutide 1.5 MG/0.5ML pen    TRULICITY    6 mL    Inject 1.5 mg Subcutaneous every 7 days    Type 2 diabetes mellitus with stage 3 chronic kidney disease, without long-term current use of insulin (H)       FLUoxetine 20 MG capsule    PROzac     Take 20 mg by mouth daily        FREESTYLE YASH READER Nakia     1 Device    1 Device 3 times daily as needed    Type 2 diabetes mellitus with stage 3 chronic kidney disease, with long-term current use of insulin (H)       insulin glargine 100 UNIT/ML injection    LANTUS SOLOSTAR    15 mL    Inject 19 units daily    Type 2 diabetes mellitus with stage 3 chronic kidney disease, with long-term current use of insulin (H)       lamoTRIgine 25 MG tablet    LaMICtal     Take 1 tablet by mouth at bedtime x2 weeks, then 2 tablets at bedtime thereafter        lisinopril 5 MG tablet    PRINIVIL/ZESTRIL    90 tablet    Take 1 tablet (5 mg) by mouth daily    Essential  hypertension       loratadine 10 MG tablet    CLARITIN    90 tablet    Take 1 tablet (10 mg) by mouth every morning    Chronic seasonal allergic rhinitis, unspecified trigger       montelukast 10 MG tablet    SINGULAIR    90 tablet    Take 1 tablet (10 mg) by mouth At Bedtime    Mild intermittent asthma with acute exacerbation       omeprazole 20 MG CR capsule    priLOSEC    60 capsule    Take 1 capsule (20 mg) by mouth daily    Gastroesophageal reflux disease without esophagitis       ranitidine 300 MG tablet    ZANTAC    90 tablet    Take 1 tablet (300 mg) by mouth At Bedtime    Gastroesophageal reflux disease without esophagitis       simvastatin 20 MG tablet    ZOCOR     Take 20 mg by mouth        Sod Fluoride-Potassium Nitrate 1.1-5 % Pste           ULTICARE MINI 31G X 6 MM   Generic drug:  insulin pen needle      AS DIRECTED WITH LANTUS PENS        ULTILET SHARPS CONTAINER 1QT Misc     1 each    1 Device as needed    Type 2 diabetes mellitus with stage 3 chronic kidney disease, without long-term current use of insulin (H)

## 2018-11-14 ENCOUNTER — OFFICE VISIT (OUTPATIENT)
Dept: AUDIOLOGY | Facility: CLINIC | Age: 33
End: 2018-11-14
Payer: MEDICARE

## 2018-11-14 ENCOUNTER — OFFICE VISIT (OUTPATIENT)
Dept: OTOLARYNGOLOGY | Facility: CLINIC | Age: 33
End: 2018-11-14
Payer: MEDICARE

## 2018-11-14 VITALS
WEIGHT: 196 LBS | BODY MASS INDEX: 33.64 KG/M2 | TEMPERATURE: 97.9 F | DIASTOLIC BLOOD PRESSURE: 71 MMHG | SYSTOLIC BLOOD PRESSURE: 127 MMHG | HEART RATE: 92 BPM

## 2018-11-14 DIAGNOSIS — H66.23 CHRONIC ATTICOANTRAL SUPPURATIVE OTITIS MEDIA OF BOTH EARS: ICD-10-CM

## 2018-11-14 DIAGNOSIS — H92.03 OTALGIA, BILATERAL: ICD-10-CM

## 2018-11-14 DIAGNOSIS — H90.6 MIXED HEARING LOSS, BILATERAL: Primary | ICD-10-CM

## 2018-11-14 DIAGNOSIS — Z72.0 TOBACCO ABUSE: ICD-10-CM

## 2018-11-14 DIAGNOSIS — H72.91 PERFORATION OF TYMPANIC MEMBRANE, RIGHT: ICD-10-CM

## 2018-11-14 DIAGNOSIS — H90.3 BILATERAL SENSORINEURAL HEARING LOSS: Primary | ICD-10-CM

## 2018-11-14 PROCEDURE — 99207 ZZC NO CHARGE LOS: CPT | Performed by: AUDIOLOGIST

## 2018-11-14 PROCEDURE — 92567 TYMPANOMETRY: CPT | Performed by: AUDIOLOGIST

## 2018-11-14 PROCEDURE — 99204 OFFICE O/P NEW MOD 45 MIN: CPT | Performed by: OTOLARYNGOLOGY

## 2018-11-14 PROCEDURE — 92557 COMPREHENSIVE HEARING TEST: CPT | Performed by: AUDIOLOGIST

## 2018-11-14 RX ORDER — PREDNISOLONE ACETATE 10 MG/ML
5 SUSPENSION/ DROPS OPHTHALMIC 2 TIMES DAILY
Qty: 10 ML | Refills: 0 | Status: SHIPPED | OUTPATIENT
Start: 2018-11-14 | End: 2019-01-31

## 2018-11-14 ASSESSMENT — PAIN SCALES - GENERAL: PAINLEVEL: NO PAIN (0)

## 2018-11-14 NOTE — MR AVS SNAPSHOT
After Visit Summary   11/14/2018    Divina Delgadillo    MRN: [id number]           Patient Information     Date Of Birth          1985        Visit Information        Provider Department      11/14/2018 1:00 PM Kita Looney, [facility]        Today's Diagnoses     Mixed hearing loss, bilateral    -  1       Follow-ups after your visit        Your next 10 appointments already scheduled     Dec 03, 2018  3:00 PM CST   Diabetes Education with [place] [facility]   [facility] [place] ([facility])    [address] [facility] [address]   [phone]            Jan 14, 2019 10:00 AM CST   LAB with [facility] FLOOR Critical access hospital ([facility])    [address] [place] [address] [id number] [phone]           Please do not eat 10-12 hours before your appointment if you are coming in fasting for labs on lipids, cholesterol, or glucose (sugar). This does not apply to pregnant women. Water, hot tea and black coffee (with nothing added) are okay. Do not drink other fluids, diet soda or chew gum.            Jan 14, 2019 10:30 AM CST   Return Visit with Sánchez Dias MD   [facility]    [address] [place] [address] [id number] [phone]              Future tests that were ordered for you today     Open Future Orders        Priority Expected Expires Ordered    CT Temporal Bones wo Contrast Routine 11/28/2018 11/14/2019 11/14/2018            Who to contact     If you have questions or need follow up information about today's clinic visit or your schedule please contact [facility] directly at [phone].  Normal or non-critical lab and imaging results will be communicated to you by MyChart, letter or phone within 4 business days after the clinic has received the results. If you do not hear from us within 7 days, please contact  the clinic through [employer] or phone. If you have a critical or abnormal lab result, we will notify you by phone as soon as possible.  Submit refill requests through [employer] or call your pharmacy and they will forward the refill request to us. Please allow 3 business days for your refill to be completed.          Additional Information About Your Visit        Care EveryWhere ID     This is your Care EveryWhere ID. This could be used by other organizations to access your [place] medical records  [id number]         Blood Pressure from Last 3 Encounters:   11/14/18 127/71   11/05/18 138/46   09/20/18 124/58    Weight from Last 3 Encounters:   11/14/18 196 lb (88.9 kg)   11/05/18 196 lb (88.9 kg)   09/20/18 195 lb (88.5 kg)              We Performed the Following     AUDIOGRAM/TYMPANOGRAM - INTERFACE     COMPREHENSIVE HEARING TEST     TYMPANOMETRY          Today's Medication Changes          These changes are accurate as of 11/14/18  2:29 PM.  If you have any questions, ask your nurse or doctor.               Start taking these medicines.        Dose/Directions    prednisoLONE acetate 1 % ophthalmic susp   Commonly known as:  PRED FORTE   Used for:  Chronic atticoantral suppurative otitis media of both ears   Started by:  Meena Pelaez MD        Dose:  5 drop   Place 5 drops into both ears 2 times daily for 10 days   Quantity:  10 mL   Refills:  0            Where to get your medicines      These medications were sent to [facility] - - MATIAS Mcnamara  [address] [address]    Hours:  SILVER Mcnamara [facility] Phone:  [phone]     prednisoLONE acetate 1 % ophthalmic susp                Primary Care Provider Office Phone # Fax #    Jaleesa VERONIQUE Tarango [phone] [address] [facility] [address]        Goals        General    Monitoring (pt-stated)     Notes - Note created  11/5/2018  3:32 PM by Rosemary  JACQUES Alberto    My Goal: I will log all my meals at least 5 days a week     What I need to meet my goal: keep food logs nearby    I plan to meet my goal by this date: 30 days         Problem Solving (pt-stated)     Notes - Note created  8/27/2018  3:52 PM by Dolly Riley RD    Goal Statement: add in pre dinner blood sugar check M-W-[id number]  Measure of Success: review of blood sugar logs in 1 month  Supportive Steps to Achieve:  Highlight days to check before dinner   Barriers: remembering  Strengths: You already are checking your blood sugar very well   Date to Achieve By: 1 month, next visit   Patient expressed understanding of goal: yes            Equal Access to Services     [facility] : Hadii juan manuel martinez Sotrace, waaxda luqadaha, qaybta kaalmada adequangyada, aaron echeverria . So [facility] [phone].    ATENCIÓN: Si habla español, tiene a gil disposición servicios gratuitos de asistencia lingüística. Llame al [phone].    We comply with applicable federal civil rights laws and [place] laws. We do not discriminate on the basis of race, color, national origin, age, disability, sex, sexual orientation, or gender identity.            Thank you!     Thank you for choosing [facility]  for your care. Our goal is always to provide you with excellent care. Hearing back from our patients is one way we can continue to improve our services. Please take a few minutes to complete the written survey that you may receive in the mail after your visit with us. Thank you!             Your Updated Medication List - Protect others around you: Learn how to safely use, store and throw away your medicines at www.disposemymeds.org.          This list is accurate as of 11/14/18  2:29 PM.  Always use your most recent med list.                   Brand Name Dispense Instructions for use Diagnosis    ABILIFY 30 MG tablet   Generic drug:  ARIPiprazole      Take 30 mg by mouth daily         albuterol 108 (90 Base) MCG/ACT inhaler    PROAIR HFA/PROVENTIL HFA/VENTOLIN HFA    1 Inhaler    Inhale 2 puffs into the lungs every 6 hours as needed for shortness of breath / dyspnea or wheezing    Mild intermittent asthma with acute exacerbation       amLODIPine 5 MG tablet    NORVASC    90 tablet    Take 1 tablet (5 mg) by mouth daily    Essential hypertension       blood glucose monitoring lancets     100 each    Use to test blood sugar 3 times daily    Type 2 diabetes mellitus with stage 3 chronic kidney disease, with long-term current use of insulin (H)       blood glucose monitoring test strip    no brand specified    100 each    1 strip by In Vitro route 3 times daily    Type 2 diabetes mellitus with stage 3 chronic kidney disease, with long-term current use of insulin (H)       cefuroxime 500 MG tablet    CEFTIN    20 tablet    Take 1 tablet (500 mg) by mouth 2 times daily    Recurrent AOM (acute otitis media) of both ears       chlorthalidone 25 MG tablet    HYGROTON     Take 12.5 mg by mouth        Clozapine 200 MG tablet    CLOZARIL     Take 200 mg by mouth 2 times daily        continuous blood glucose monitoring sensor     3 each    For use with Freestyle Yash Flash  for continuous monitioring of blood glucose levels. Replace sensor every 10 days.    Type 2 diabetes mellitus with stage 3 chronic kidney disease, with long-term current use of insulin (H)       dulaglutide 1.5 MG/0.5ML pen    TRULICITY    6 mL    Inject 1.5 mg Subcutaneous every 7 days    Type 2 diabetes mellitus with stage 3 chronic kidney disease, without long-term current use of insulin (H)       FLUoxetine 20 MG capsule    PROzac     Take 20 mg by mouth daily        FREESTYLE YASH READER Nakia     1 Device    1 Device 3 times daily as needed    Type 2 diabetes mellitus with stage 3 chronic kidney disease, with long-term current use of insulin (H)       insulin glargine 100 UNIT/ML injection    LANTUS SOLOSTAR    15 mL     Inject 19 units daily    Type 2 diabetes mellitus with stage 3 chronic kidney disease, with long-term current use of insulin (H)       lamoTRIgine 25 MG tablet    LaMICtal     Take 1 tablet by mouth at bedtime x2 weeks, then 2 tablets at bedtime thereafter        lisinopril 5 MG tablet    PRINIVIL/ZESTRIL    90 tablet    Take 1 tablet (5 mg) by mouth daily    Essential hypertension       loratadine 10 MG tablet    CLARITIN    90 tablet    Take 1 tablet (10 mg) by mouth every morning    Chronic seasonal allergic rhinitis, unspecified trigger       montelukast 10 MG tablet    SINGULAIR    90 tablet    Take 1 tablet (10 mg) by mouth At Bedtime    Mild intermittent asthma with acute exacerbation       omeprazole 20 MG CR capsule    priLOSEC    60 capsule    Take 1 capsule (20 mg) by mouth daily    Gastroesophageal reflux disease without esophagitis       prednisoLONE acetate 1 % ophthalmic susp    PRED FORTE    10 mL    Place 5 drops into both ears 2 times daily for 10 days    Chronic atticoantral suppurative otitis media of both ears       ranitidine 300 MG tablet    ZANTAC    90 tablet    Take 1 tablet (300 mg) by mouth At Bedtime    Gastroesophageal reflux disease without esophagitis       simvastatin 20 MG tablet    ZOCOR     Take 20 mg by mouth        Sod Fluoride-Potassium Nitrate 1.1-5 % Pste           ULTICARE MINI 31G X 6 MM   Generic drug:  insulin pen needle      AS DIRECTED WITH LANTUS PENS        ULTILET SHARPS CONTAINER 1QT Misc     1 each    1 Device as needed    Type 2 diabetes mellitus with stage 3 chronic kidney disease, without long-term current use of insulin (H)

## 2018-11-14 NOTE — LETTER
11/14/2018         RE: Divina Delgadillo  [address] [address] [place] [id number]        Dear Colleague,    Thank you for referring your patient, Divina Delgadillo, to the [facility]. Please see a copy of my visit note below.        History of Present Illness - Divina Delgadillo is a 33 year old female who presents with concerns about an ear infection experiencing hearing loss for about a month. She reports bilateral ear pressure and decreased hearing. Patient states she recently has had bilateral ear infections treated appropriately with antibiotics.  She reports she has a history of hearing loss, but she has never worn hearing aides. She did have PE tubes as a child, but has no other history of ear surgery. Patient denies any fever, chills, congestion, change in sense of taste, drainage, or popping. She states she is not pregnant. Patient is currently a smoker.         Past Medical History -   Patient Active Problem List   Diagnosis     Esophageal reflux     non alcoholic fatty liver disease     HTN (hypertension)     HYPERLIPIDEMIA LDL GOAL <100     DVT (deep venous thrombosis) (H)     CKD (chronic kidney disease) stage 3, GFR 30-59 ml/min (H)     Malignant neoplasm of pancreas, unspecified location of malignancy (H)     Type 2 diabetes mellitus with stage 3 chronic kidney disease, with long-term current use of insulin (H)     Bipolar disorder (H)     Cervical high risk HPV (human papillomavirus) test positive     IUD (intrauterine device) in place     Morbid obesity (H)     Mild intermittent asthma with acute exacerbation     Nicotine abuse       Current Medications -   Current Outpatient Prescriptions:      amLODIPine (NORVASC) 5 MG tablet, Take 1 tablet (5 mg) by mouth daily, Disp: 90 tablet, Rfl: 1     ARIPiprazole (ABILIFY) 30 MG tablet, Take 30 mg by mouth daily, Disp: , Rfl:      blood glucose monitoring (NO BRAND SPECIFIED) test strip, 1 strip by In Vitro route 3 times daily, Disp: 100  each, Rfl: 11     blood glucose monitoring (SOFTCLIX) lancets, Use to test blood sugar 3 times daily, Disp: 100 each, Rfl: 11     cefuroxime (CEFTIN) 500 MG tablet, Take 1 tablet (500 mg) by mouth 2 times daily, Disp: 20 tablet, Rfl: 0     chlorthalidone (HYGROTON) 25 MG tablet, Take 12.5 mg by mouth, Disp: , Rfl:      Clozapine (CLOZARIL) 200 MG tablet, Take 200 mg by mouth 2 times daily, Disp: , Rfl:      Continuous Blood Gluc  (FREESTYLE BRIANNA READER) VANE, 1 Device 3 times daily as needed, Disp: 1 Device, Rfl: 3     continuous blood glucose monitoring (FREESTYLE BRIANNA) sensor, For use with Freestyle Brianna Flash  for continuous monitioring of blood glucose levels. Replace sensor every 10 days., Disp: 3 each, Rfl: 11     dulaglutide (TRULICITY) 1.5 MG/0.5ML pen, Inject 1.5 mg Subcutaneous every 7 days, Disp: 6 mL, Rfl: 3     FLUoxetine (PROZAC) 20 MG capsule, Take 20 mg by mouth daily, Disp: , Rfl:      insulin glargine (LANTUS SOLOSTAR) 100 UNIT/ML pen, Inject 19 units daily, Disp: 15 mL, Rfl: 2     lamoTRIgine (LAMICTAL) 25 MG tablet, Take 1 tablet by mouth at bedtime x2 weeks, then 2 tablets at bedtime thereafter, Disp: , Rfl: 0     lisinopril (PRINIVIL/ZESTRIL) 5 MG tablet, Take 1 tablet (5 mg) by mouth daily, Disp: 90 tablet, Rfl: 3     loratadine (CLARITIN) 10 MG tablet, Take 1 tablet (10 mg) by mouth every morning, Disp: 90 tablet, Rfl: 1     montelukast (SINGULAIR) 10 MG tablet, Take 1 tablet (10 mg) by mouth At Bedtime, Disp: 90 tablet, Rfl: 1     omeprazole (PRILOSEC) 20 MG CR capsule, Take 1 capsule (20 mg) by mouth daily, Disp: 60 capsule, Rfl: 5     prednisoLONE acetate (PRED FORTE) 1 % ophthalmic susp, Place 5 drops into both ears 2 times daily for 10 days, Disp: 10 mL, Rfl: 0     ranitidine (ZANTAC) 300 MG tablet, Take 1 tablet (300 mg) by mouth At Bedtime, Disp: 90 tablet, Rfl: 1     simvastatin (ZOCOR) 20 MG tablet, Take 20 mg by mouth, Disp: , Rfl:      Sod Fluoride-Potassium  Nitrate 1.1-5 % PSTE, , Disp: , Rfl:      ULTICARE MINI 31G X 6 MM insulin pen needle, AS DIRECTED WITH LANTUS PENS, Disp: , Rfl: 11     albuterol (PROAIR HFA/PROVENTIL HFA/VENTOLIN HFA) 108 (90 BASE) MCG/ACT Inhaler, Inhale 2 puffs into the lungs every 6 hours as needed for shortness of breath / dyspnea or wheezing (Patient not taking: Reported on 11/14/2018), Disp: 1 Inhaler, Rfl: 3     ULTILET SHARPS CONTAINER 1QT MISC, 1 Device as needed (Patient not taking: Reported on 11/14/2018), Disp: 1 each, Rfl: 11    Allergies -   Allergies   Allergen Reactions     Acetaminophen Other (See Comments)     pt previously tried to overdose on it, PMD does not want pt taking per pt.      Latex      Latex Rash       Social History -   Social History     Social History     Marital status: Single     Spouse name: N/A     Number of children: 0     Years of education: N/A     Social History Main Topics     Smoking status: Former Smoker     Packs/day: 1.00     Years: 4.00     Types: Cigarettes     Smokeless tobacco: Never Used      Comment: 15 cig/day.  I strongly emphasized the importance of quitting smoking.     Alcohol use No     Drug use: No     Sexual activity: Yes     Partners: Female      Comment: Both male and female      Other Topics Concern     Parent/Sibling W/ Cabg, Mi Or Angioplasty Before 65f 55m? No     Social History Narrative       Family History -   Family History   Problem Relation Age of Onset     Respiratory Mother      ASTHMA     Allergies Mother      Depression Mother      HEART DISEASE Father      HEART VALVE REPLACEMENT     Hypertension Father      Diabetes Father      Depression Father      Respiratory Brother      Allergies Brother      Respiratory Sister      Allergies Sister      Depression Sister      Respiratory Sister      Allergies Sister      Depression Sister      KIDNEY DISEASE No family hx of        Review of Systems - As per HPI and PMHx, otherwise 7 system review of the head and neck negative.  10+ system review negative.    Physical Exam  /71 (BP Location: Right arm, Patient Position: Chair, Cuff Size: Adult Regular)  Pulse 92  Temp 97.9  F (36.6  C) (Oral)  Wt 88.9 kg (196 lb)  BMI 33.64 kg/m2  General - The patient is well nourished and well developed, and appears to have good nutritional status.  Alert and oriented to person and place, answers questions and cooperates with examination appropriately.   Head and Face - Normocephalic and atraumatic, with no gross asymmetry noted of the contour of the facial features.  The facial nerve is intact, with strong symmetric movements.  Voice and Breathing - The patient was breathing comfortably without the use of accessory muscles. There was no wheezing, stridor, or stertor.  The patients voice was clear and strong, and had appropriate pitch and quality.  Ears - Left ear drum is very retracted, there is a deep posterior retraction pocket with granulation tissue. Is unable to insufflate on the left.  Right ear granulation tissue of lateral aspect of the TM with retracted TM. Is able to autoinsufflate, perforation anterior inferiorly within granulation tissue   Eyes - Extraocular movements intact.  Sclera were not icteric or injected, conjunctiva were pink and moist.  Mouth - Examination of the oral cavity showed pink, healthy oral mucosa. No lesions or ulcerations noted.  The tongue was mobile and midline, and the dentition were in good condition.    Throat - The walls of the oropharynx were smooth, pink, moist, symmetric, and had no lesions or ulcerations.  The tonsillar pillars and soft palate were symmetric.  The uvula was midline on elevation.  Neck - Normal midline excursion of the laryngotracheal complex during swallowing.  Full range of motion on passive movement.  Palpation of the occipital, submental, submandibular, internal jugular chain, and supraclavicular nodes did not demonstrate any abnormal lymph nodes or masses.  The carotid pulse was  palpable bilaterally.  Palpation of the thyroid was soft and smooth, with no nodules or goiter appreciated.  The trachea was mobile and midline.  Nose - External contour is symmetric, no gross deflection or scars.  Nasal mucosa is pink and moist with no abnormal mucus.  The septum was midline and non-obstructive, turbinates of normal size and position.  No polyps, masses, or purulence noted on examination.    Assessment - Divina Delgadillo is a 33 year old female with bilateral SNHL, bilateral otalgia, chronic atticoantral suppurative OM, and perforation of right TM. I recommended steroid ear drops for 10 days twice a day in both ears, and to obtain a CT scan of temporal bone in 2 weeks. We will review this at f/u at that time. I discussed the importance of smoking cessation to reduce inflammation of the middle ear mucosa, and to aid in wound healing should she need a tympanoplasty.     This document serves as a record of the services and decisions personally performed and made by Meena Pelaez MD. It was created on his behalf by Kathryn Rosario, a trained medical scribe. The creation of this document is based on the provider's statements to the medical scribe.  Kathryn Rosario November 14, 2018        The information in this document, created by the medical scribe for me, accurately reflects the services I personally performed and the decisions made by me. I have reviewed and approved this document for accuracy prior to leaving the patient care area.  November 14, 2018 2:07 PM    Dr. Meena Pelaez MD  Otolaryngology  [facility]          Again, thank you for allowing me to participate in the care of your patient.        Sincerely,        Meena Pelaez MD

## 2018-11-14 NOTE — NURSING NOTE
"Initial /71 (BP Location: Right arm, Patient Position: Chair, Cuff Size: Adult Regular)  Pulse 92  Temp 97.9  F (36.6  C) (Oral)  Wt 88.9 kg (196 lb)  BMI 33.64 kg/m2 Estimated body mass index is 33.64 kg/(m^2) as calculated from the following:    Height as of 11/5/18: 1.626 m (5' 4\").    Weight as of this encounter: 88.9 kg (196 lb). .    June Burroughs LPN    "

## 2018-11-14 NOTE — PROGRESS NOTES
AUDIOLOGY REPORT    SUBJECTIVE:  Divina Delgadillo is a 33 year old female who was seen in the Audiology Clinic at [facility] for an audiologic evaluation, referred by Dr. Pelaez.  The patient has been seen previously in this clinic for assessment and results indicated a mild to moderate mixed hearing loss bilaterally. The patient reports a recent ear infection with intermittent otalgia and decreased hearing. Patient does have a history of ear infections with previous ear surgeries.    OBJECTIVE:  Otoscopic exam indicates ears are clear of cerumen bilaterally     Pure Tone Thresholds assessed using conventional audiometry with good  reliability from 250-8000 Hz bilaterally using circumaural headphones     RIGHT:  mild and moderate mixed hearing loss    LEFT:    mild and moderate mixed hearing loss    Tympanogram:    RIGHT: restricted eardrum mobility     LEFT:   restricted eardrum mobility     Speech Reception Threshold:    RIGHT: 45 dB HL    LEFT:   45 dB HL  Word Recognition Score:     RIGHT: 80% at 75 dB HL using W22 recorded word list.    LEFT:   92% at 75 dB HL using W22 recorded word list.      ASSESSMENT:   Moderate to mild mixed hearing loss bilaterally.     Today s results were discussed with the patient in detail.     PLAN: It is recommended that the patient be seen by Dr. Pelaez for medical evaluation of their ears and hearing evaluation. Patient was counseled regarding hearing loss and impact on communication.  Please call this clinic with questions regarding these results or recommendations.        Kita Looney M.A. DEMETRIA-AAA  Clinical audiologist [place] # [id number]  11/14/2018

## 2018-11-14 NOTE — PROGRESS NOTES
History of Present Illness - Divina Delgadillo is a 33 year old female who presents with concerns about an ear infection experiencing hearing loss for about a month. She reports bilateral ear pressure and decreased hearing. Patient states she recently has had bilateral ear infections treated appropriately with antibiotics.  She reports she has a history of hearing loss, but she has never worn hearing aides. She did have PE tubes as a child, but has no other history of ear surgery. Patient denies any fever, chills, congestion, change in sense of taste, drainage, or popping. She states she is not pregnant. Patient is currently a smoker.         Past Medical History -   Patient Active Problem List   Diagnosis     Esophageal reflux     non alcoholic fatty liver disease     HTN (hypertension)     HYPERLIPIDEMIA LDL GOAL <100     DVT (deep venous thrombosis) (H)     CKD (chronic kidney disease) stage 3, GFR 30-59 ml/min (H)     Malignant neoplasm of pancreas, unspecified location of malignancy (H)     Type 2 diabetes mellitus with stage 3 chronic kidney disease, with long-term current use of insulin (H)     Bipolar disorder (H)     Cervical high risk HPV (human papillomavirus) test positive     IUD (intrauterine device) in place     Morbid obesity (H)     Mild intermittent asthma with acute exacerbation     Nicotine abuse       Current Medications -   Current Outpatient Prescriptions:      amLODIPine (NORVASC) 5 MG tablet, Take 1 tablet (5 mg) by mouth daily, Disp: 90 tablet, Rfl: 1     ARIPiprazole (ABILIFY) 30 MG tablet, Take 30 mg by mouth daily, Disp: , Rfl:      blood glucose monitoring (NO BRAND SPECIFIED) test strip, 1 strip by In Vitro route 3 times daily, Disp: 100 each, Rfl: 11     blood glucose monitoring (SOFTCLIX) lancets, Use to test blood sugar 3 times daily, Disp: 100 each, Rfl: 11     cefuroxime (CEFTIN) 500 MG tablet, Take 1 tablet (500 mg) by mouth 2 times daily, Disp: 20 tablet, Rfl: 0      chlorthalidone (HYGROTON) 25 MG tablet, Take 12.5 mg by mouth, Disp: , Rfl:      Clozapine (CLOZARIL) 200 MG tablet, Take 200 mg by mouth 2 times daily, Disp: , Rfl:      Continuous Blood Gluc  (FREESTYLE BRIANNA READER) VANE, 1 Device 3 times daily as needed, Disp: 1 Device, Rfl: 3     continuous blood glucose monitoring (FREESTYLE BRIANNA) sensor, For use with Freestyle Brianna Flash  for continuous monitioring of blood glucose levels. Replace sensor every 10 days., Disp: 3 each, Rfl: 11     dulaglutide (TRULICITY) 1.5 MG/0.5ML pen, Inject 1.5 mg Subcutaneous every 7 days, Disp: 6 mL, Rfl: 3     FLUoxetine (PROZAC) 20 MG capsule, Take 20 mg by mouth daily, Disp: , Rfl:      insulin glargine (LANTUS SOLOSTAR) 100 UNIT/ML pen, Inject 19 units daily, Disp: 15 mL, Rfl: 2     lamoTRIgine (LAMICTAL) 25 MG tablet, Take 1 tablet by mouth at bedtime x2 weeks, then 2 tablets at bedtime thereafter, Disp: , Rfl: 0     lisinopril (PRINIVIL/ZESTRIL) 5 MG tablet, Take 1 tablet (5 mg) by mouth daily, Disp: 90 tablet, Rfl: 3     loratadine (CLARITIN) 10 MG tablet, Take 1 tablet (10 mg) by mouth every morning, Disp: 90 tablet, Rfl: 1     montelukast (SINGULAIR) 10 MG tablet, Take 1 tablet (10 mg) by mouth At Bedtime, Disp: 90 tablet, Rfl: 1     omeprazole (PRILOSEC) 20 MG CR capsule, Take 1 capsule (20 mg) by mouth daily, Disp: 60 capsule, Rfl: 5     prednisoLONE acetate (PRED FORTE) 1 % ophthalmic susp, Place 5 drops into both ears 2 times daily for 10 days, Disp: 10 mL, Rfl: 0     ranitidine (ZANTAC) 300 MG tablet, Take 1 tablet (300 mg) by mouth At Bedtime, Disp: 90 tablet, Rfl: 1     simvastatin (ZOCOR) 20 MG tablet, Take 20 mg by mouth, Disp: , Rfl:      Sod Fluoride-Potassium Nitrate 1.1-5 % PSTE, , Disp: , Rfl:      ULTICARE MINI 31G X 6 MM insulin pen needle, AS DIRECTED WITH LANTUS PENS, Disp: , Rfl: 11     albuterol (PROAIR HFA/PROVENTIL HFA/VENTOLIN HFA) 108 (90 BASE) MCG/ACT Inhaler, Inhale 2 puffs into the  lungs every 6 hours as needed for shortness of breath / dyspnea or wheezing (Patient not taking: Reported on 11/14/2018), Disp: 1 Inhaler, Rfl: 3     ULTILET SHARPS CONTAINER 1QT MISC, 1 Device as needed (Patient not taking: Reported on 11/14/2018), Disp: 1 each, Rfl: 11    Allergies -   Allergies   Allergen Reactions     Acetaminophen Other (See Comments)     pt previously tried to overdose on it, PMD does not want pt taking per pt.      Latex      Latex Rash       Social History -   Social History     Social History     Marital status: Single     Spouse name: N/A     Number of children: 0     Years of education: N/A     Social History Main Topics     Smoking status: Former Smoker     Packs/day: 1.00     Years: 4.00     Types: Cigarettes     Smokeless tobacco: Never Used      Comment: 15 cig/day.  I strongly emphasized the importance of quitting smoking.     Alcohol use No     Drug use: No     Sexual activity: Yes     Partners: Female      Comment: Both male and female      Other Topics Concern     Parent/Sibling W/ Cabg, Mi Or Angioplasty Before 65f 55m? No     Social History Narrative       Family History -   Family History   Problem Relation Age of Onset     Respiratory Mother      ASTHMA     Allergies Mother      Depression Mother      HEART DISEASE Father      HEART VALVE REPLACEMENT     Hypertension Father      Diabetes Father      Depression Father      Respiratory Brother      Allergies Brother      Respiratory Sister      Allergies Sister      Depression Sister      Respiratory Sister      Allergies Sister      Depression Sister      KIDNEY DISEASE No family hx of        Review of Systems - As per HPI and PMHx, otherwise 7 system review of the head and neck negative. 10+ system review negative.    Physical Exam  /71 (BP Location: Right arm, Patient Position: Chair, Cuff Size: Adult Regular)  Pulse 92  Temp 97.9  F (36.6  C) (Oral)  Wt 88.9 kg (196 lb)  BMI 33.64 kg/m2  General - The patient is  well nourished and well developed, and appears to have good nutritional status.  Alert and oriented to person and place, answers questions and cooperates with examination appropriately.   Head and Face - Normocephalic and atraumatic, with no gross asymmetry noted of the contour of the facial features.  The facial nerve is intact, with strong symmetric movements.  Voice and Breathing - The patient was breathing comfortably without the use of accessory muscles. There was no wheezing, stridor, or stertor.  The patients voice was clear and strong, and had appropriate pitch and quality.  Ears - Left ear drum is very retracted, there is a deep posterior retraction pocket with granulation tissue. Is unable to insufflate on the left.  Right ear granulation tissue of lateral aspect of the TM with retracted TM. Is able to autoinsufflate, perforation anterior inferiorly within granulation tissue   Eyes - Extraocular movements intact.  Sclera were not icteric or injected, conjunctiva were pink and moist.  Mouth - Examination of the oral cavity showed pink, healthy oral mucosa. No lesions or ulcerations noted.  The tongue was mobile and midline, and the dentition were in good condition.    Throat - The walls of the oropharynx were smooth, pink, moist, symmetric, and had no lesions or ulcerations.  The tonsillar pillars and soft palate were symmetric.  The uvula was midline on elevation.  Neck - Normal midline excursion of the laryngotracheal complex during swallowing.  Full range of motion on passive movement.  Palpation of the occipital, submental, submandibular, internal jugular chain, and supraclavicular nodes did not demonstrate any abnormal lymph nodes or masses.  The carotid pulse was palpable bilaterally.  Palpation of the thyroid was soft and smooth, with no nodules or goiter appreciated.  The trachea was mobile and midline.  Nose - External contour is symmetric, no gross deflection or scars.  Nasal mucosa is pink and  moist with no abnormal mucus.  The septum was midline and non-obstructive, turbinates of normal size and position.  No polyps, masses, or purulence noted on examination.    Assessment - Divina Delgadillo is a 33 year old female with bilateral SNHL, bilateral otalgia, chronic atticoantral suppurative OM, and perforation of right TM. I recommended steroid ear drops for 10 days twice a day in both ears, and to obtain a CT scan of temporal bone in 2 weeks. We will review this at f/u at that time. I discussed the importance of smoking cessation to reduce inflammation of the middle ear mucosa, and to aid in wound healing should she need a tympanoplasty.     This document serves as a record of the services and decisions personally performed and made by Meena Pelaez MD. It was created on his behalf by Kathryn Rosario, a trained medical scribe. The creation of this document is based on the provider's statements to the medical scribe.  Kathryn Rosario November 14, 2018        The information in this document, created by the medical scribe for me, accurately reflects the services I personally performed and the decisions made by me. I have reviewed and approved this document for accuracy prior to leaving the patient care area.  November 14, 2018 2:07 PM    Dr. Meena Pelaez MD  Otolaryngology  [facility]

## 2018-11-26 ENCOUNTER — HOSPITAL ENCOUNTER (OUTPATIENT)
Dept: CT IMAGING | Facility: CLINIC | Age: 33
Discharge: HOME | End: 2018-11-26
Attending: OTOLARYNGOLOGY | Admitting: OTOLARYNGOLOGY
Payer: MEDICARE

## 2018-11-26 DIAGNOSIS — H66.23 CHRONIC ATTICOANTRAL SUPPURATIVE OTITIS MEDIA OF BOTH EARS: ICD-10-CM

## 2018-11-26 DIAGNOSIS — F25.0 SCHIZOAFFECTIVE DISORDER, BIPOLAR TYPE (H): ICD-10-CM

## 2018-11-26 DIAGNOSIS — N18.30 TYPE 2 DIABETES MELLITUS WITH STAGE 3 CHRONIC KIDNEY DISEASE, WITH LONG-TERM CURRENT USE OF INSULIN (H): ICD-10-CM

## 2018-11-26 DIAGNOSIS — Z79.4 TYPE 2 DIABETES MELLITUS WITH STAGE 3 CHRONIC KIDNEY DISEASE, WITH LONG-TERM CURRENT USE OF INSULIN (H): ICD-10-CM

## 2018-11-26 DIAGNOSIS — Z79.899 MEDICATION MANAGEMENT: ICD-10-CM

## 2018-11-26 DIAGNOSIS — E11.22 TYPE 2 DIABETES MELLITUS WITH STAGE 3 CHRONIC KIDNEY DISEASE, WITH LONG-TERM CURRENT USE OF INSULIN (H): ICD-10-CM

## 2018-11-26 LAB
BASOPHILS # BLD AUTO: 0.2 10E9/L (ref 0–0.2)
BASOPHILS NFR BLD AUTO: 1 %
DIFFERENTIAL METHOD BLD: ABNORMAL
EOSINOPHIL # BLD AUTO: 0.3 10E9/L (ref 0–0.7)
EOSINOPHIL NFR BLD AUTO: 1.9 %
ERYTHROCYTE [DISTWIDTH] IN BLOOD BY AUTOMATED COUNT: 13.5 % (ref 10–15)
HBA1C MFR BLD: 8.7 % (ref 0–5.6)
HCT VFR BLD AUTO: 38.8 % (ref 35–47)
HGB BLD-MCNC: 12.2 G/DL (ref 11.7–15.7)
IMM GRANULOCYTES # BLD: 0.6 10E9/L (ref 0–0.4)
IMM GRANULOCYTES NFR BLD: 3.1 %
LYMPHOCYTES # BLD AUTO: 6.6 10E9/L (ref 0.8–5.3)
LYMPHOCYTES NFR BLD AUTO: 36.5 %
MCH RBC QN AUTO: 31.3 PG (ref 26.5–33)
MCHC RBC AUTO-ENTMCNC: 31.4 G/DL (ref 31.5–36.5)
MCV RBC AUTO: 100 FL (ref 78–100)
MONOCYTES # BLD AUTO: 1.4 10E9/L (ref 0–1.3)
MONOCYTES NFR BLD AUTO: 7.5 %
NEUTROPHILS # BLD AUTO: 9.1 10E9/L (ref 1.6–8.3)
NEUTROPHILS NFR BLD AUTO: 50 %
NRBC # BLD AUTO: 0 10*3/UL
NRBC BLD AUTO-RTO: 0 /100
PLATELET # BLD AUTO: 374 10E9/L (ref 150–450)
RBC # BLD AUTO: 3.9 10E12/L (ref 3.8–5.2)
VARIANT LYMPHS BLD QL SMEAR: PRESENT
WBC # BLD AUTO: 18.1 10E9/L (ref 4–11)

## 2018-11-26 PROCEDURE — 85025 COMPLETE CBC W/AUTO DIFF WBC: CPT | Performed by: CLINICAL NURSE SPECIALIST

## 2018-11-26 PROCEDURE — 70480 CT ORBIT/EAR/FOSSA W/O DYE: CPT

## 2018-11-26 PROCEDURE — 36415 COLL VENOUS BLD VENIPUNCTURE: CPT | Performed by: CLINICAL NURSE SPECIALIST

## 2018-11-26 PROCEDURE — 83036 HEMOGLOBIN GLYCOSYLATED A1C: CPT | Performed by: NURSE PRACTITIONER

## 2018-11-27 ENCOUNTER — OFFICE VISIT (OUTPATIENT)
Dept: FAMILY MEDICINE | Facility: CLINIC | Age: 33
End: 2018-11-27
Payer: MEDICARE

## 2018-11-27 ENCOUNTER — TELEPHONE (OUTPATIENT)
Dept: FAMILY MEDICINE | Facility: CLINIC | Age: 33
End: 2018-11-27

## 2018-11-27 VITALS
OXYGEN SATURATION: 98 % | DIASTOLIC BLOOD PRESSURE: 60 MMHG | SYSTOLIC BLOOD PRESSURE: 138 MMHG | HEART RATE: 104 BPM | BODY MASS INDEX: 34.16 KG/M2 | WEIGHT: 199 LBS | TEMPERATURE: 98.4 F | RESPIRATION RATE: 16 BRPM

## 2018-11-27 DIAGNOSIS — I10 ESSENTIAL HYPERTENSION: ICD-10-CM

## 2018-11-27 DIAGNOSIS — N18.30 TYPE 2 DIABETES MELLITUS WITH STAGE 3 CHRONIC KIDNEY DISEASE, WITH LONG-TERM CURRENT USE OF INSULIN (H): Primary | ICD-10-CM

## 2018-11-27 DIAGNOSIS — E11.22 TYPE 2 DIABETES MELLITUS WITH STAGE 3 CHRONIC KIDNEY DISEASE, WITH LONG-TERM CURRENT USE OF INSULIN (H): Primary | ICD-10-CM

## 2018-11-27 DIAGNOSIS — E66.01 MORBID OBESITY (H): ICD-10-CM

## 2018-11-27 DIAGNOSIS — Z79.4 TYPE 2 DIABETES MELLITUS WITH STAGE 3 CHRONIC KIDNEY DISEASE, WITH LONG-TERM CURRENT USE OF INSULIN (H): Primary | ICD-10-CM

## 2018-11-27 PROBLEM — Z86.2 H/O LEUKOCYTOSIS: Status: ACTIVE | Noted: 2018-11-27

## 2018-11-27 PROCEDURE — 99214 OFFICE O/P EST MOD 30 MIN: CPT | Performed by: INTERNAL MEDICINE

## 2018-11-27 ASSESSMENT — ANXIETY QUESTIONNAIRES
3. WORRYING TOO MUCH ABOUT DIFFERENT THINGS: MORE THAN HALF THE DAYS
4. TROUBLE RELAXING: SEVERAL DAYS
1. FEELING NERVOUS, ANXIOUS, OR ON EDGE: SEVERAL DAYS
5. BEING SO RESTLESS THAT IT IS HARD TO SIT STILL: NOT AT ALL
7. FEELING AFRAID AS IF SOMETHING AWFUL MIGHT HAPPEN: MORE THAN HALF THE DAYS
2. NOT BEING ABLE TO STOP OR CONTROL WORRYING: SEVERAL DAYS
GAD7 TOTAL SCORE: 7
6. BECOMING EASILY ANNOYED OR IRRITABLE: NOT AT ALL

## 2018-11-27 ASSESSMENT — PATIENT HEALTH QUESTIONNAIRE - PHQ9: SUM OF ALL RESPONSES TO PHQ QUESTIONS 1-9: 16

## 2018-11-27 NOTE — TELEPHONE ENCOUNTER
Please call patient and her foster mom.  See my note from today - A1C doesn't match glucometer readings.  I discussed with diabetes RN Katiana.  We recommend a recheck A1C since it doesn't match her glucometer values.  She can have this done anytime prior to seeing Katiana next week.  I also recommend she bring her actual glucometer for Katiana to download, not just her sheet.

## 2018-11-27 NOTE — MR AVS SNAPSHOT
After Visit Summary   11/27/2018    Divina Delgadillo    MRN: [id number]           Patient Information     Date Of Birth          1985        Visit Information        Provider Department      11/27/2018 2:40 PM Shital Rodriguez,  [facility]        Today's Diagnoses     Type 2 diabetes mellitus with stage 3 chronic kidney disease, with long-term current use of insulin (H)    -  1    Morbid obesity (H)        Essential hypertension          Care Instructions    1. Dr. Rodriguez will communicate with Katiana Riley          Follow-ups after your visit        Follow-up notes from your care team     Return in about 3 months (around 2/27/2019) for Diabetes Check with lab prior.      Your next 10 appointments already scheduled     Dec 03, 2018  2:30 PM CST   Return Visit with Meena Pelaez MD   [facility]    [address] [address]   [phone]            Dec 03, 2018  3:00 PM CST   Diabetes Education with [place] [facility]   [facility] ([facility])    [address] [address]   [phone]            Dec 05, 2018  3:30 PM CST   Telephone Visit with Mahin Penny   [facility] ([facility])    [id number] [place] [id number] [phone]           Note: this is not an onsite visit; there is no need to come to the facility.            Jan 14, 2019 10:00 AM CST   LAB with LAB FIRST FLOOR [facility] ([facility])    [address] [place] [id number] [phone]           Please do not eat 10-12 hours before your appointment if you are coming in fasting for labs on lipids, cholesterol, or glucose (sugar). This does not apply to pregnant women. Water, hot tea and black coffee (with nothing added) are okay. Do not drink other fluids, diet soda or chew gum.            Jan  14, 2019 10:30 AM CST   Return Visit with Sánchez Dias MD   [facility] ([facility]    [address] [place] [id number] [phone]              Who to contact     If you have questions or need follow up information about today's clinic visit or your schedule please contact [facility] directly at [phone].  Normal or non-critical lab and imaging results will be communicated to you by MyChart, letter or phone within 4 business days after the clinic has received the results. If you do not hear from us within 7 days, please contact the clinic through MyChart or phone. If you have a critical or abnormal lab result, we will notify you by phone as soon as possible.  Submit refill requests through [employer] or call your pharmacy and they will forward the refill request to us. Please allow 3 business days for your refill to be completed.          Additional Information About Your Visit        Care EveryWhere ID     This is your Care EveryWhere ID. This could be used by other organizations to access your [place] medical records  [id number]        Your Vitals Were     Pulse Temperature Respirations Pulse Oximetry BMI (Body Mass Index)       104 98.4  F (36.9  C) (Tympanic) 16 98% 34.16 kg/m2        Blood Pressure from Last 3 Encounters:   11/27/18 138/60   11/14/18 127/71   11/05/18 138/46    Weight from Last 3 Encounters:   11/27/18 199 lb (90.3 kg)   11/14/18 196 lb (88.9 kg)   11/05/18 196 lb (88.9 kg)              Today, you had the following     No orders found for display         Today's Medication Changes          These changes are accurate as of 11/27/18  3:12 PM.  If you have any questions, ask your nurse or doctor.               Stop taking these medicines if you haven't already. Please contact your care team if you have questions.     cefuroxime 500 MG tablet   Commonly known as:  CEFTIN   Stopped by:  Shital Rodriguez, DO            Sod Fluoride-Potassium Nitrate 1.1-5 % Pste   Stopped by:  Shital Rodriguez,                     Primary Care Provider Office Phone # Fax #    VERONIQUE Bledsoe [phone] [address] [facility] [address] [facility] [address]        Goals        General    Monitoring (pt-stated)     Notes - Note created  11/5/2018  3:32 PM by Dolly Riley RD    My Goal: I will log all my meals at least 5 days a week     What I need to meet my goal: keep food logs nearby    I plan to meet my goal by this date: 30 days         Problem Solving (pt-stated)     Notes - Note created  8/27/2018  3:52 PM by Dolly Riley RD    Goal Statement: add in pre dinner blood sugar check M-W-F  Measure of Success: review of blood sugar logs in 1 month  Supportive Steps to Achieve:  Highlight days to check before dinner   Barriers: remembering  Strengths: You already are checking your blood sugar very well   Date to Achieve By: 1 month, next visit   Patient expressed understanding of goal: yes            Equal Access to Services     [facility] : Hadii juan manuel martinez Sotrace, waaxda luqadaha, qaybta kaalmaaaron carmona. So [facility] [phone].    ATENCIÓN: Si habla español, tiene a gil disposición servicios gratuitos de asistencia lingüística. Llame al [phone].    We comply with applicable federal civil rights laws and [place] laws. We do not discriminate on the basis of race, color, national origin, age, disability, sex, sexual orientation, or gender identity.            Thank you!     Thank you for choosing [facility]  for your care. Our goal is always to provide you with excellent care. Hearing back from our patients is one way we can continue to improve our services. Please take a few minutes to complete the written survey that you may receive in the mail after your visit with us. Thank you!             Your Updated Medication List - Protect  others around you: Learn how to safely use, store and throw away your medicines at www.disposemymeds.org.          This list is accurate as of 11/27/18  3:12 PM.  Always use your most recent med list.                   Brand Name Dispense Instructions for use Diagnosis    ABILIFY 30 MG tablet   Generic drug:  ARIPiprazole      Take 30 mg by mouth daily        albuterol 108 (90 Base) MCG/ACT inhaler    PROAIR HFA/PROVENTIL HFA/VENTOLIN HFA    1 Inhaler    Inhale 2 puffs into the lungs every 6 hours as needed for shortness of breath / dyspnea or wheezing    Mild intermittent asthma with acute exacerbation       amLODIPine 5 MG tablet    NORVASC    90 tablet    Take 1 tablet (5 mg) by mouth daily    Essential hypertension       blood glucose monitoring lancets     100 each    Use to test blood sugar 3 times daily    Type 2 diabetes mellitus with stage 3 chronic kidney disease, with long-term current use of insulin (H)       blood glucose monitoring test strip    NO BRAND SPECIFIED    100 each    1 strip by In Vitro route 3 times daily    Type 2 diabetes mellitus with stage 3 chronic kidney disease, with long-term current use of insulin (H)       chlorthalidone 25 MG tablet    HYGROTON     Take 12.5 mg by mouth        Clozapine 200 MG tablet    CLOZARIL     Take 200 mg by mouth 2 times daily        continuous blood glucose monitoring sensor     3 each    For use with Freestyle Yash Flash  for continuous monitioring of blood glucose levels. Replace sensor every 10 days.    Type 2 diabetes mellitus with stage 3 chronic kidney disease, with long-term current use of insulin (H)       dulaglutide 1.5 MG/0.5ML pen    TRULICITY    6 mL    Inject 1.5 mg Subcutaneous every 7 days    Type 2 diabetes mellitus with stage 3 chronic kidney disease, without long-term current use of insulin (H)       FLUoxetine 20 MG capsule    PROzac     Take 20 mg by mouth daily        FREESTYLE YASH READER Nakia     1 Device    1 Device 3  times daily as needed    Type 2 diabetes mellitus with stage 3 chronic kidney disease, with long-term current use of insulin (H)       insulin glargine 100 UNIT/ML pen    LANTUS SOLOSTAR    15 mL    Inject 19 units daily    Type 2 diabetes mellitus with stage 3 chronic kidney disease, with long-term current use of insulin (H)       lamoTRIgine 25 MG tablet    LaMICtal     Take 1 tablet by mouth at bedtime x2 weeks, then 2 tablets at bedtime thereafter        lisinopril 5 MG tablet    PRINIVIL/ZESTRIL    90 tablet    Take 1 tablet (5 mg) by mouth daily    Essential hypertension       loratadine 10 MG tablet    CLARITIN    90 tablet    Take 1 tablet (10 mg) by mouth every morning    Chronic seasonal allergic rhinitis, unspecified trigger       montelukast 10 MG tablet    SINGULAIR    90 tablet    Take 1 tablet (10 mg) by mouth At Bedtime    Mild intermittent asthma with acute exacerbation       omeprazole 20 MG DR capsule    priLOSEC    60 capsule    Take 1 capsule (20 mg) by mouth daily    Gastroesophageal reflux disease without esophagitis       ranitidine 300 MG tablet    ZANTAC    90 tablet    Take 1 tablet (300 mg) by mouth At Bedtime    Gastroesophageal reflux disease without esophagitis       simvastatin 20 MG tablet    ZOCOR     Take 20 mg by mouth        ULTICARE MINI 31G X 6 MM miscellaneous   Generic drug:  insulin pen needle      AS DIRECTED WITH LANTUS PENS        ULTILET SHARPS CONTAINER 1QT Misc     1 each    1 Device as needed    Type 2 diabetes mellitus with stage 3 chronic kidney disease, without long-term current use of insulin (H)

## 2018-11-27 NOTE — PROGRESS NOTES
SUBJECTIVE:   Divina Delgadillo is a 33 year old female who presents to clinic today for the following health issues:    Here with Lorin, foster mother.      Diabetes Follow-up    Patient is checking blood sugars: three times daily.   Blood sugar testing frequency justification: Uncontrolled diabetes  Results are as follows:         am - 106-217         lunchtime - 108-249         bedtime -     Diabetic concerns: None     Symptoms of hypoglycemia (low blood sugar): none     Paresthesias (numbness or burning in feet) or sores: Yes numbness     Date of last diabetic eye exam: just had one in July    Trulicity 1.5 weekly, Lantus 19 daily in AM    Metformin was stopped due to liver (?) issues.    She was on glipizide which was stopped for reasons unclear to me.    Her insurance won't pay for Yash    She has follow-up with diabetes RN in < 1 week.    She doesn't follow any specific diet and she doesn't exercise.  She tries for a week or so, then will give up.    [facility]    Hyperlipidemia Follow-Up      Rate your low fat/cholesterol diet?: poor    Taking statin?  Yes, no muscle aches from statin    Other lipid medications/supplements?:  none    Hypertension Follow-up      Outpatient blood pressures are being checked at home.  Results are 140s/60s.    Low Salt Diet: not monitoring salt    BP Readings from Last 2 Encounters:   11/27/18 138/60   11/14/18 127/71     Hemoglobin A1C (%)   Date Value   11/26/2018 8.7 (H)   06/21/2018 7.2 (H)     LDL Cholesterol Calculated (mg/dL)   Date Value   06/21/2018 141 (H)   06/15/2017 192 (H)       Amount of exercise or physical activity: nothing on a regular basis    Problems taking medications regularly: No    Medication side effects: none    Diet: regular (no restrictions)      Leukocytosis:  Sees Heme/Onc - I reviewed with the patient today causes of leukocytosis.  There is no evidence of chronic leukemia or myeloproliferative disorder.  The patient  "leukocytosis probably related to reactive phenomenon secondary to smoking, infection, inflammation, recurrent malignancy.  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\"  Current Outpatient Prescriptions   Medication Sig Dispense Refill     albuterol (PROAIR HFA/PROVENTIL HFA/VENTOLIN HFA) 108 (90 BASE) MCG/ACT Inhaler Inhale 2 puffs into the lungs every 6 hours as needed for shortness of breath / dyspnea or wheezing 1 Inhaler 3     amLODIPine (NORVASC) 5 MG tablet Take 1 tablet (5 mg) by mouth daily 90 tablet 1     ARIPiprazole (ABILIFY) 30 MG tablet Take 30 mg by mouth daily       chlorthalidone (HYGROTON) 25 MG tablet Take 12.5 mg by mouth       Clozapine (CLOZARIL) 200 MG tablet Take 200 mg by mouth 2 times daily       dulaglutide (TRULICITY) 1.5 MG/0.5ML pen Inject 1.5 mg Subcutaneous every 7 days 6 mL 3     FLUoxetine (PROZAC) 20 MG capsule Take 20 mg by mouth daily       insulin glargine (LANTUS SOLOSTAR) 100 UNIT/ML pen Inject 19 units daily 15 mL 2     lamoTRIgine (LAMICTAL) 25 MG tablet Take 1 tablet by mouth at bedtime x2 weeks, then 2 tablets at bedtime thereafter  0     lisinopril (PRINIVIL/ZESTRIL) 5 MG tablet Take 1 tablet (5 mg) by mouth daily 90 tablet 3     loratadine (CLARITIN) 10 MG tablet Take 1 tablet (10 mg) by mouth every morning 90 tablet 1     montelukast (SINGULAIR) 10 MG tablet Take 1 tablet (10 mg) by mouth At Bedtime 90 tablet 1     omeprazole (PRILOSEC) 20 MG CR capsule Take 1 capsule (20 mg) by mouth daily 60 capsule 5     ranitidine (ZANTAC) 300 MG tablet Take 1 tablet (300 mg) by mouth At Bedtime 90 tablet 1     simvastatin (ZOCOR) 20 MG tablet Take 20 mg by mouth       blood glucose monitoring (NO BRAND SPECIFIED) test strip 1 strip by In Vitro route 3 times daily 100 " each 11     blood glucose monitoring (SOFTCLIX) lancets Use to test blood sugar 3 times daily 100 each 11     Continuous Blood Gluc  (FREESTYLE BRIANNA READER) VANE 1 Device 3 times daily as needed 1 Device 3     continuous blood glucose monitoring (FREESTYLE BRIANNA) sensor For use with Freestyle Brianna Flash  for continuous monitioring of blood glucose levels. Replace sensor every 10 days. 3 each 11     ULTICARE MINI 31G X 6 MM insulin pen needle AS DIRECTED WITH LANTUS PENS  11     ULTILET SHARPS CONTAINER 1QT MISC 1 Device as needed (Patient not taking: Reported on 11/14/2018) 1 each 11       Reviewed and updated as needed this visit by clinical staff  Tobacco  Allergies  Meds  Problems  Med Hx  Surg Hx  Fam Hx  Soc Hx        Reviewed and updated as needed this visit by Provider  Allergies  Meds  Problems         ROS:  Constitutional, HEENT, cardiovascular, pulmonary, gi and gu systems are negative, except as otherwise noted.    OBJECTIVE:     /60 (BP Location: Right arm, Patient Position: Sitting, Cuff Size: Adult Regular)  Pulse 104  Temp 98.4  F (36.9  C) (Tympanic)  Resp 16  Wt 199 lb (90.3 kg)  SpO2 98%  BMI 34.16 kg/m2  Body mass index is 34.16 kg/(m^2).  GENERAL APPEARANCE: alert, no distress, over weight and significant truncal obesity.  Tearful     Diagnostic Test Results:  Results for orders placed or performed during the hospital encounter of 11/26/18   CT Temporal Bones wo Contrast    Narrative    CT TEMPORAL BONES WITHOUT CONTRAST  11/26/2018 3:45 PM     HISTORY:  Chronic atticoantral suppurative otitis media of both ears.    TECHNIQUE: High Resolution Images of both temporal bones were  obtained.  Radiation dose for this scan was reduced using automated  exposure control, adjustment of the mA and/or kV according to patient  size, or iterative reconstruction technique.    COMPARISON: None.    FINDINGS: The patient was scanned with the head tilted towards the  left.  This is causing distortion of the middle ear anatomy.    Right Temporal Bone: The external canal is normal. There is retraction  of the tympanic membrane. The tympanic membrane is thickened. There is  a small amount of soft tissue material within Prussak's space on  coronal images 145 and 146 of series 5. This measures about 2 mm in  cephalocaudad dimension. The scutum appears blunted and may be eroded.  There is a small amount of soft tissue material in the middle ear  cavity, in the region of the oval window. This could represent  inflammatory debris, but a cholesteatoma could have this appearance.  The ossicles appear intact. No definite ossicular erosions are seen.  Mastoid antrum is aerated. Multiple right mastoid air cells are  opacified. The tegmen tympani and horizontal semicircular canals  appear intact. The cochlea, vestibule, semicircular canals and  cochlear aqueduct are normal. The facial nerve canal and internal  auditory canal are normal. The carotid canal and jugular foramen are  normal. The temporomandibular joint is normal.     Left Temporal Bone: The external canal is normal. The tympanic  membrane is severely retracted. No definite middle ear mass is  identified on these images. The middle ear cavity is aerated. The  stapes is not well seen on these images. The left mastoid is poorly  aerated. The cochlea, vestibule, semicircular canals and cochlear  aqueduct are normal. The facial nerve canal and internal auditory  canal are normal. The carotid canal and jugular foramen are normal.  The temporomandibular joint is normal.     Sinuses: The visualized portions of the sinuses and nasopharynx appear  normal.       Impression    IMPRESSION:   1. Small amount of soft tissue in the region of the right Prussak's  space. Retracted right tympanic membrane. Questionable erosion of the  scutum. These findings are suspicious for a cholesteatoma. Multiple  right mastoid air cells are opacified.  2. Severely  retracted left tympanic membrane. No definite middle ear  mass is identified on these images. The left mastoid is poorly  aerated.    DONNA ROWLAND MD       ASSESSMENT/PLAN:     1. Type 2 diabetes mellitus with stage 3 chronic kidney disease, with long-term current use of insulin (H) - per chart review, she has had diabetes for 20 years, which is concerning given her young age.  She has significant mental health history.  She is not able to follow a diet consistently, or exercise regularly.  She has a health .  She is feeling overwhelmed and discouraged by all of her health conditions which she feels are poorly controlled.  Her A1c is not matching her glucometer readings.  Is not clear to me why there is such a discrepancy.  Her average blood sugar should be in the 200s, but review of her last 2 months shows that at least 80% of her fasting blood sugars are at goal.  Her pre-meal blood sugars are also routinely less than 180, with only scattering readings above 200.  She does have follow-up with diabetes educator on 12/3.  Since I have never seen the patient before, I will develop a plan with the diabetes educator who is seen her in the past.  A olayinka or other trial of CGM would be most helpful to delineate where her hyperglycemia is occurring.    2. Morbid obesity (H) -discussed diet and exercise    3. Essential hypertension -well controlled          Shital Rodriguez,   [facility]

## 2018-11-27 NOTE — Clinical Note
Her A1c is not matching her daily glucometer readings. Yash? sounds like an insurance issue?  No changes at this time. Repeat A1C?  Error on that lab value?

## 2018-11-28 ASSESSMENT — ANXIETY QUESTIONNAIRES: GAD7 TOTAL SCORE: 7

## 2018-11-30 DIAGNOSIS — E11.22 TYPE 2 DIABETES MELLITUS WITH STAGE 3 CHRONIC KIDNEY DISEASE, WITH LONG-TERM CURRENT USE OF INSULIN (H): ICD-10-CM

## 2018-11-30 DIAGNOSIS — Z79.4 TYPE 2 DIABETES MELLITUS WITH STAGE 3 CHRONIC KIDNEY DISEASE, WITH LONG-TERM CURRENT USE OF INSULIN (H): ICD-10-CM

## 2018-11-30 DIAGNOSIS — N18.30 TYPE 2 DIABETES MELLITUS WITH STAGE 3 CHRONIC KIDNEY DISEASE, WITH LONG-TERM CURRENT USE OF INSULIN (H): ICD-10-CM

## 2018-11-30 LAB — HBA1C MFR BLD: 9 % (ref 0–5.6)

## 2018-11-30 PROCEDURE — 83036 HEMOGLOBIN GLYCOSYLATED A1C: CPT | Performed by: INTERNAL MEDICINE

## 2018-11-30 PROCEDURE — 36415 COLL VENOUS BLD VENIPUNCTURE: CPT | Performed by: INTERNAL MEDICINE

## 2018-12-03 ENCOUNTER — OFFICE VISIT (OUTPATIENT)
Dept: OTOLARYNGOLOGY | Facility: CLINIC | Age: 33
End: 2018-12-03
Payer: MEDICARE

## 2018-12-03 ENCOUNTER — ALLIED HEALTH/NURSE VISIT (OUTPATIENT)
Dept: EDUCATION SERVICES | Facility: CLINIC | Age: 33
End: 2018-12-03
Payer: MEDICARE

## 2018-12-03 VITALS
SYSTOLIC BLOOD PRESSURE: 139 MMHG | HEART RATE: 109 BPM | BODY MASS INDEX: 34.16 KG/M2 | TEMPERATURE: 98.2 F | WEIGHT: 199 LBS | DIASTOLIC BLOOD PRESSURE: 72 MMHG

## 2018-12-03 DIAGNOSIS — H69.93 CHRONIC EUSTACHIAN TUBE DYSFUNCTION, BILATERAL: Primary | ICD-10-CM

## 2018-12-03 DIAGNOSIS — Z79.4 TYPE 2 DIABETES MELLITUS WITH STAGE 3 CHRONIC KIDNEY DISEASE, WITH LONG-TERM CURRENT USE OF INSULIN (H): Primary | ICD-10-CM

## 2018-12-03 DIAGNOSIS — N18.30 TYPE 2 DIABETES MELLITUS WITH STAGE 3 CHRONIC KIDNEY DISEASE, WITH LONG-TERM CURRENT USE OF INSULIN (H): Primary | ICD-10-CM

## 2018-12-03 DIAGNOSIS — E11.22 TYPE 2 DIABETES MELLITUS WITH STAGE 3 CHRONIC KIDNEY DISEASE, WITH LONG-TERM CURRENT USE OF INSULIN (H): Primary | ICD-10-CM

## 2018-12-03 PROCEDURE — 95250 CONT GLUC MNTR PHYS/QHP EQP: CPT

## 2018-12-03 PROCEDURE — 99213 OFFICE O/P EST LOW 20 MIN: CPT | Performed by: OTOLARYNGOLOGY

## 2018-12-03 PROCEDURE — G0108 DIAB MANAGE TRN  PER INDIV: HCPCS

## 2018-12-03 ASSESSMENT — PAIN SCALES - GENERAL: PAINLEVEL: NO PAIN (0)

## 2018-12-03 NOTE — PROGRESS NOTES
History of Present Illness - Divina Delgadillo is a 33 year old female I have been following for right otorrhea and a long history of Eustachian tube dysfunction. I put her on steroid drops last time. She denies any drainage from the ear. Her hearing remains poor bilaterally.    Past Medical History -   Patient Active Problem List   Diagnosis     Esophageal reflux     non alcoholic fatty liver disease     Essential hypertension     HYPERLIPIDEMIA LDL GOAL <100     DVT (deep venous thrombosis) (H)     CKD (chronic kidney disease) stage 3, GFR 30-59 ml/min (H)     Malignant neoplasm of pancreas, unspecified location of malignancy (H)     Type 2 diabetes mellitus with stage 3 chronic kidney disease, with long-term current use of insulin (H)     Bipolar disorder (H)     Cervical high risk HPV (human papillomavirus) test positive     IUD (intrauterine device) in place     Morbid obesity (H)     Mild intermittent asthma with acute exacerbation     Nicotine abuse     H/O leukocytosis       Current Medications -   Current Outpatient Prescriptions:      albuterol (PROAIR HFA/PROVENTIL HFA/VENTOLIN HFA) 108 (90 BASE) MCG/ACT Inhaler, Inhale 2 puffs into the lungs every 6 hours as needed for shortness of breath / dyspnea or wheezing, Disp: 1 Inhaler, Rfl: 3     amLODIPine (NORVASC) 5 MG tablet, Take 1 tablet (5 mg) by mouth daily, Disp: 90 tablet, Rfl: 1     ARIPiprazole (ABILIFY) 30 MG tablet, Take 30 mg by mouth daily, Disp: , Rfl:      blood glucose monitoring (NO BRAND SPECIFIED) test strip, 1 strip by In Vitro route 3 times daily, Disp: 100 each, Rfl: 11     blood glucose monitoring (SOFTCLIX) lancets, Use to test blood sugar 3 times daily, Disp: 100 each, Rfl: 11     chlorthalidone (HYGROTON) 25 MG tablet, Take 12.5 mg by mouth, Disp: , Rfl:      Clozapine (CLOZARIL) 200 MG tablet, Take 200 mg by mouth 2 times daily, Disp: , Rfl:      Continuous Blood Gluc  (FREESTYLE BRIANNA READER) VANE, 1 Device 3 times daily as  needed, Disp: 1 Device, Rfl: 3     continuous blood glucose monitoring (FREESTYLE BRIANNA) sensor, For use with Freestyle Brianna Flash  for continuous monitioring of blood glucose levels. Replace sensor every 10 days., Disp: 3 each, Rfl: 11     dulaglutide (TRULICITY) 1.5 MG/0.5ML pen, Inject 1.5 mg Subcutaneous every 7 days, Disp: 6 mL, Rfl: 3     FLUoxetine (PROZAC) 20 MG capsule, Take 20 mg by mouth daily, Disp: , Rfl:      insulin glargine (LANTUS SOLOSTAR) 100 UNIT/ML pen, Inject 19 units daily, Disp: 15 mL, Rfl: 2     lamoTRIgine (LAMICTAL) 25 MG tablet, Take 1 tablet by mouth at bedtime x2 weeks, then 2 tablets at bedtime thereafter, Disp: , Rfl: 0     lisinopril (PRINIVIL/ZESTRIL) 5 MG tablet, Take 1 tablet (5 mg) by mouth daily, Disp: 90 tablet, Rfl: 3     loratadine (CLARITIN) 10 MG tablet, Take 1 tablet (10 mg) by mouth every morning, Disp: 90 tablet, Rfl: 1     montelukast (SINGULAIR) 10 MG tablet, Take 1 tablet (10 mg) by mouth At Bedtime, Disp: 90 tablet, Rfl: 1     omeprazole (PRILOSEC) 20 MG CR capsule, Take 1 capsule (20 mg) by mouth daily, Disp: 60 capsule, Rfl: 5     ranitidine (ZANTAC) 300 MG tablet, Take 1 tablet (300 mg) by mouth At Bedtime, Disp: 90 tablet, Rfl: 1     simvastatin (ZOCOR) 20 MG tablet, Take 20 mg by mouth, Disp: , Rfl:      ULTICARE MINI 31G X 6 MM insulin pen needle, AS DIRECTED WITH LANTUS PENS, Disp: , Rfl: 11     ULTILET SHARPS CONTAINER 1QT MISC, 1 Device as needed (Patient not taking: Reported on 11/14/2018), Disp: 1 each, Rfl: 11    Allergies -   Allergies   Allergen Reactions     Acetaminophen Other (See Comments)     pt previously tried to overdose on it, PMD does not want pt taking per pt.      Latex      Latex Rash       Social History -   Social History     Social History     Marital status: Single     Spouse name: N/A     Number of children: 0     Years of education: N/A     Social History Main Topics     Smoking status: Current Every Day Smoker     Packs/day:  0.50     Years: 4.00     Types: Cigarettes     Smokeless tobacco: Never Used      Comment: has information about quitplan     Alcohol use No     Drug use: No     Sexual activity: Yes     Partners: Female      Comment: Both male and female      Other Topics Concern     Parent/Sibling W/ Cabg, Mi Or Angioplasty Before 65f 55m? No     Social History Narrative       Family History -   Family History   Problem Relation Age of Onset     Respiratory Mother      ASTHMA     Allergies Mother      Depression Mother      Heart Disease Father      HEART VALVE REPLACEMENT     Hypertension Father      Diabetes Father      Depression Father      Respiratory Brother      Allergies Brother      Respiratory Sister      Allergies Sister      Depression Sister      Respiratory Sister      Allergies Sister      Depression Sister      KIDNEY DISEASE No family hx of        Review of Systems - As per HPI and PMHx, otherwise 7 system review of the head and neck negative. 10+ system review negative.    Exam:  /72 (BP Location: Right arm, Patient Position: Chair, Cuff Size: Adult Regular)  Pulse 109  Temp 98.2  F (36.8  C) (Oral)  Wt 90.3 kg (199 lb)  BMI 34.16 kg/m2  General - The patient is well nourished and well developed, and appears to have good nutritional status.  Alert and oriented to person and place, answers questions and cooperates with examination appropriately.   Head and Face - Normocephalic and atraumatic, with no gross asymmetry noted of the contour of the facial features.  The facial nerve is intact, with strong symmetric movements.  Eyes - Extraocular movements intact.  Sclera were not icteric or injected, conjunctiva were pink and moist.  Ears - right ear canal is dry. After a small amount of debris removed from the medial canal and TM, the area concnerning for cholesteatoma on the CT was closely examined, revealing chronic granulation tissue overlying the short handle of the malleus. No keratin debris was  identified in this area after debriding much of the granulation tissue under microscope. There is also a field of granulation tissue inferiorly on the TM, and there is a very small perforation in the anterior inferior quadrant through which thick mucus was suctioned.    CT Temporal bone 11/26/18 directly visualized. Evidence of bilateral chronic Eustachian tube dysfunction. Right ear with a high possibly dehiscent jugular bulb nearing the inferior insertion of the tympanic membrane. Left ear with a very low tegmen abuting the bony ear canal.    IMPRESSION:   1. Small amount of soft tissue in the region of the right Prussak's  space. Retracted right tympanic membrane. Questionable erosion of the  scutum. These findings are suspicious for a cholesteatoma. Multiple  right mastoid air cells are opacified.  2. Severely retracted left tympanic membrane. No definite middle ear  mass is identified on these images. The left mastoid is poorly  aerated.    A/P - Divina Delgadillo is a 33 year old female with chronic Eustachian tube dysfunction and resultant anatomic abnormalities of the middle ear. While there is some concern for an early cholesteatoma on the CT, I do not find keratin debris on close inspection, and suspect that the soft tissue mass on the scutum is granulation tissue. I discussed that dry ear precautions are mandatory given the chronic inflammatory changes in her ear. I also discussed the option of exploratory tympanotomy and possible tympanoplasty on the right, but I recommended we continue to closely monitor her ears with another exam in 2 months. I also offered referral to an otologist for a second opinion, but she declines at this point.      Dr. Meena Pelaez MD  Otolaryngology  [facility]

## 2018-12-03 NOTE — MR AVS SNAPSHOT
After Visit Summary   12/3/2018    Divina Delgadillo    MRN: [id number]           Patient Information     Date Of Birth          1985        Visit Information        Provider Department      12/3/2018 3:00 PM [place] [facility]        Today's Diagnoses     Type 2 diabetes mellitus with stage 3 chronic kidney disease, with long-term current use of insulin (H)    -  1       Follow-ups after your visit        Your next 10 appointments already scheduled     Dec 05, 2018  3:30 PM CST   Telephone Visit with Mahin Penny   [facility]    [address] [facility] [id number] [phone]           Note: this is not an onsite visit; there is no need to come to the facility.            Dec 17, 2018  3:00 PM CST   Diabetes Education with [facility] [facility]   [facility] ([facility])    [address] [facility] [address]   [phone]            Jan 14, 2019 10:00 AM CST   LAB with LAB FIRST FLOOR [facility] ([facility])    [address] [place] [address] [phone]           Please do not eat 10-12 hours before your appointment if you are coming in fasting for labs on lipids, cholesterol, or glucose (sugar). This does not apply to pregnant women. Water, hot tea and black coffee (with nothing added) are okay. Do not drink other fluids, diet soda or chew gum.            Jan 14, 2019 10:30 AM CST   Return Visit with Sánchez Dias MD   [facility] ([facility])    [address] [address] [place] [address] [phone]              Who to contact     If you have questions or need follow up information about today's clinic visit or your schedule please contact [facility] directly at [phone].  Normal or non-critical lab and imaging  results will be communicated to you by MyChart, letter or phone within 4 business days after the clinic has received the results. If you do not hear from us within 7 days, please contact the clinic through MyChart or phone. If you have a critical or abnormal lab result, we will notify you by phone as soon as possible.  Submit refill requests through [employer] or call your pharmacy and they will forward the refill request to us. Please allow 3 business days for your refill to be completed.          Additional Information About Your Visit        Care EveryWhere ID     This is your Care EveryWhere ID. This could be used by other organizations to access your [place] medical records  [id number]         Blood Pressure from Last 3 Encounters:   12/03/18 139/72   11/27/18 138/60   11/14/18 127/71    Weight from Last 3 Encounters:   12/03/18 90.3 kg (199 lb)   11/27/18 90.3 kg (199 lb)   11/14/18 88.9 kg (196 lb)              We Performed the Following     CONTINUOUS GLUCOSE MONITORING                              [[id number]]     DIABETES EDUCATION - Individual  []          Today's Medication Changes          These changes are accurate as of 12/3/18 11:59 PM.  If you have any questions, ask your nurse or doctor.               Start taking these medicines.        Dose/Directions    ACCU-CHEK GUIDE w/Device Kit   Used for:  Type 2 diabetes mellitus with stage 3 chronic kidney disease, with long-term current use of insulin (H)        Dose:  1 Device   1 Device daily   Quantity:  1 kit   Refills:  0            Where to get your medicines      Some of these will need a paper prescription and others can be bought over the counter.  Ask your nurse if you have questions.     You don't need a prescription for these medications     ACCU-CHEK GUIDE w/Device Kit                Primary Care Provider Office Phone # Fax #    VERONIQUE Bledsoe [phone] [address] [facility] [address]        \[facility] [place] [facility]\""         General    Monitoring (pt-stated)     Notes - Note created  11/5/2018  3:32 PM by Dolly Riley RD    My Goal: I will log all my meals at least 5 days a week     What I need to meet my goal: keep food logs nearby    I plan to meet my goal by this date: 30 days         Problem Solving (pt-stated)     Notes - Note created  8/27/2018  3:52 PM by Dolly Riley RD    Goal Statement: add in pre dinner blood sugar check M-W-[id number]  Measure of Success: review of blood sugar logs in 1 month  Supportive Steps to Achieve:  Highlight days to check before dinner   Barriers: remembering  Strengths: You already are checking your blood sugar very well   Date to Achieve By: 1 month, next visit   Patient expressed understanding of goal: yes            Equal Access to Services     FANG HAY : Elsa Santiago, waaxda luqadaha, qaybta kaalmada adequangyabj, aaron echeverria . So [facility] [phone].    ATENCIÓN: Si habla español, tiene a gil disposición servicios gratuitos de asistencia lingüística. Llame al [phone].    We comply with applicable federal civil rights laws and [place] laws. We do not discriminate on the basis of race, color, national origin, age, disability, sex, sexual orientation, or gender identity.            Thank you!     Thank you for choosing [place] [facility]  for your care. Our goal is always to provide you with excellent care. Hearing back from our patients is one way we can continue to improve our services. Please take a few minutes to complete the written survey that you may receive in the mail after your visit with us. Thank you!             Your Updated Medication List - Protect others around you: Learn how to safely use, store and throw away your medicines at www.disposemymeds.org.          This list is accurate as of 12/3/18 11:59 PM.  Always use your most recent med list.                   Brand Name Dispense Instructions for use  Diagnosis    ABILIFY 30 MG tablet   Generic drug:  ARIPiprazole      Take 30 mg by mouth daily        ACCU-CHEK GUIDE w/Device Kit     1 kit    1 Device daily    Type 2 diabetes mellitus with stage 3 chronic kidney disease, with long-term current use of insulin (H)       albuterol 108 (90 Base) MCG/ACT inhaler    PROAIR HFA/PROVENTIL HFA/VENTOLIN HFA    1 Inhaler    Inhale 2 puffs into the lungs every 6 hours as needed for shortness of breath / dyspnea or wheezing    Mild intermittent asthma with acute exacerbation       amLODIPine 5 MG tablet    NORVASC    90 tablet    Take 1 tablet (5 mg) by mouth daily    Essential hypertension       blood glucose monitoring lancets     100 each    Use to test blood sugar 3 times daily    Type 2 diabetes mellitus with stage 3 chronic kidney disease, with long-term current use of insulin (H)       blood glucose monitoring test strip    NO BRAND SPECIFIED    100 each    1 strip by In Vitro route 3 times daily    Type 2 diabetes mellitus with stage 3 chronic kidney disease, with long-term current use of insulin (H)       chlorthalidone 25 MG tablet    HYGROTON     Take 12.5 mg by mouth        cloZAPine 200 MG tablet    CLOZARIL     Take 200 mg by mouth 2 times daily        continuous blood glucose monitoring sensor     3 each    For use with Freestyle Yash Flash  for continuous monitioring of blood glucose levels. Replace sensor every 10 days.    Type 2 diabetes mellitus with stage 3 chronic kidney disease, with long-term current use of insulin (H)       dulaglutide 1.5 MG/0.5ML pen    TRULICITY    6 mL    Inject 1.5 mg Subcutaneous every 7 days    Type 2 diabetes mellitus with stage 3 chronic kidney disease, without long-term current use of insulin (H)       FLUoxetine 20 MG capsule    PROzac     Take 20 mg by mouth daily        FREESTYLE YASH READER Nakia     1 Device    1 Device 3 times daily as needed    Type 2 diabetes mellitus with stage 3 chronic kidney disease, with  long-term current use of insulin (H)       insulin glargine 100 UNIT/ML pen    LANTUS SOLOSTAR    15 mL    Inject 19 units daily    Type 2 diabetes mellitus with stage 3 chronic kidney disease, with long-term current use of insulin (H)       lamoTRIgine 25 MG tablet    LaMICtal     Take 1 tablet by mouth at bedtime x2 weeks, then 2 tablets at bedtime thereafter        lisinopril 5 MG tablet    PRINIVIL/ZESTRIL    90 tablet    Take 1 tablet (5 mg) by mouth daily    Essential hypertension       loratadine 10 MG tablet    CLARITIN    90 tablet    Take 1 tablet (10 mg) by mouth every morning    Chronic seasonal allergic rhinitis, unspecified trigger       montelukast 10 MG tablet    SINGULAIR    90 tablet    Take 1 tablet (10 mg) by mouth At Bedtime    Mild intermittent asthma with acute exacerbation       omeprazole 20 MG DR capsule    priLOSEC    60 capsule    Take 1 capsule (20 mg) by mouth daily    Gastroesophageal reflux disease without esophagitis       ranitidine 300 MG tablet    ZANTAC    90 tablet    Take 1 tablet (300 mg) by mouth At Bedtime    Gastroesophageal reflux disease without esophagitis       simvastatin 20 MG tablet    ZOCOR     Take 20 mg by mouth        ULTICARE MINI 31G X 6 MM miscellaneous   Generic drug:  insulin pen needle      AS DIRECTED WITH LANTUS PENS        ULTILET SHARPS CONTAINER 1QT Misc     1 each    1 Device as needed    Type 2 diabetes mellitus with stage 3 chronic kidney disease, without long-term current use of insulin (H)

## 2018-12-03 NOTE — LETTER
12/3/2018         RE: Divina Delgadillo  [address] [address] [place] [id number]        Dear Colleague,    Thank you for referring your patient, Divina Delgadillo, to the [facility]. Please see a copy of my visit note below.    History of Present Illness - Divina Delgadillo is a 33 year old female I have been following for right otorrhea and a long history of Eustachian tube dysfunction. I put her on steroid drops last time. She denies any drainage from the ear. Her hearing remains poor bilaterally.    Past Medical History -   Patient Active Problem List   Diagnosis     Esophageal reflux     non alcoholic fatty liver disease     Essential hypertension     HYPERLIPIDEMIA LDL GOAL <100     DVT (deep venous thrombosis) (H)     CKD (chronic kidney disease) stage 3, GFR 30-59 ml/min (H)     Malignant neoplasm of pancreas, unspecified location of malignancy (H)     Type 2 diabetes mellitus with stage 3 chronic kidney disease, with long-term current use of insulin (H)     Bipolar disorder (H)     Cervical high risk HPV (human papillomavirus) test positive     IUD (intrauterine device) in place     Morbid obesity (H)     Mild intermittent asthma with acute exacerbation     Nicotine abuse     H/O leukocytosis       Current Medications -   Current Outpatient Prescriptions:      albuterol (PROAIR HFA/PROVENTIL HFA/VENTOLIN HFA) 108 (90 BASE) MCG/ACT Inhaler, Inhale 2 puffs into the lungs every 6 hours as needed for shortness of breath / dyspnea or wheezing, Disp: 1 Inhaler, Rfl: 3     amLODIPine (NORVASC) 5 MG tablet, Take 1 tablet (5 mg) by mouth daily, Disp: 90 tablet, Rfl: 1     ARIPiprazole (ABILIFY) 30 MG tablet, Take 30 mg by mouth daily, Disp: , Rfl:      blood glucose monitoring (NO BRAND SPECIFIED) test strip, 1 strip by In Vitro route 3 times daily, Disp: 100 each, Rfl: 11     blood glucose monitoring (SOFTCLIX) lancets, Use to test blood sugar 3 times daily, Disp: 100 each, Rfl: 11      chlorthalidone (HYGROTON) 25 MG tablet, Take 12.5 mg by mouth, Disp: , Rfl:      Clozapine (CLOZARIL) 200 MG tablet, Take 200 mg by mouth 2 times daily, Disp: , Rfl:      Continuous Blood Gluc  (FREESTYLE BRIANNA READER) VANE, 1 Device 3 times daily as needed, Disp: 1 Device, Rfl: 3     continuous blood glucose monitoring (FREESTYLE BRIANNA) sensor, For use with Freestyle Brianna Flash  for continuous monitioring of blood glucose levels. Replace sensor every 10 days., Disp: 3 each, Rfl: 11     dulaglutide (TRULICITY) 1.5 MG/0.5ML pen, Inject 1.5 mg Subcutaneous every 7 days, Disp: 6 mL, Rfl: 3     FLUoxetine (PROZAC) 20 MG capsule, Take 20 mg by mouth daily, Disp: , Rfl:      insulin glargine (LANTUS SOLOSTAR) 100 UNIT/ML pen, Inject 19 units daily, Disp: 15 mL, Rfl: 2     lamoTRIgine (LAMICTAL) 25 MG tablet, Take 1 tablet by mouth at bedtime x2 weeks, then 2 tablets at bedtime thereafter, Disp: , Rfl: 0     lisinopril (PRINIVIL/ZESTRIL) 5 MG tablet, Take 1 tablet (5 mg) by mouth daily, Disp: 90 tablet, Rfl: 3     loratadine (CLARITIN) 10 MG tablet, Take 1 tablet (10 mg) by mouth every morning, Disp: 90 tablet, Rfl: 1     montelukast (SINGULAIR) 10 MG tablet, Take 1 tablet (10 mg) by mouth At Bedtime, Disp: 90 tablet, Rfl: 1     omeprazole (PRILOSEC) 20 MG CR capsule, Take 1 capsule (20 mg) by mouth daily, Disp: 60 capsule, Rfl: 5     ranitidine (ZANTAC) 300 MG tablet, Take 1 tablet (300 mg) by mouth At Bedtime, Disp: 90 tablet, Rfl: 1     simvastatin (ZOCOR) 20 MG tablet, Take 20 mg by mouth, Disp: , Rfl:      ULTICARE MINI 31G X 6 MM insulin pen needle, AS DIRECTED WITH LANTUS PENS, Disp: , Rfl: 11     ULTILET SHARPS CONTAINER 1QT MISC, 1 Device as needed (Patient not taking: Reported on 11/14/2018), Disp: 1 each, Rfl: 11    Allergies -   Allergies   Allergen Reactions     Acetaminophen Other (See Comments)     pt previously tried to overdose on it, PMD does not want pt taking per pt.      Latex       Latex Rash       Social History -   Social History     Social History     Marital status: Single     Spouse name: N/A     Number of children: 0     Years of education: N/A     Social History Main Topics     Smoking status: Current Every Day Smoker     Packs/day: 0.50     Years: 4.00     Types: Cigarettes     Smokeless tobacco: Never Used      Comment: has information about quitplan     Alcohol use No     Drug use: No     Sexual activity: Yes     Partners: Female      Comment: Both male and female      Other Topics Concern     Parent/Sibling W/ Cabg, Mi Or Angioplasty Before 65f 55m? No     Social History Narrative       Family History -   Family History   Problem Relation Age of Onset     Respiratory Mother      ASTHMA     Allergies Mother      Depression Mother      Heart Disease Father      HEART VALVE REPLACEMENT     Hypertension Father      Diabetes Father      Depression Father      Respiratory Brother      Allergies Brother      Respiratory Sister      Allergies Sister      Depression Sister      Respiratory Sister      Allergies Sister      Depression Sister      KIDNEY DISEASE No family hx of        Review of Systems - As per HPI and PMHx, otherwise 7 system review of the head and neck negative. 10+ system review negative.    Exam:  /72 (BP Location: Right arm, Patient Position: Chair, Cuff Size: Adult Regular)  Pulse 109  Temp 98.2  F (36.8  C) (Oral)  Wt 90.3 kg (199 lb)  BMI 34.16 kg/m2  General - The patient is well nourished and well developed, and appears to have good nutritional status.  Alert and oriented to person and place, answers questions and cooperates with examination appropriately.   Head and Face - Normocephalic and atraumatic, with no gross asymmetry noted of the contour of the facial features.  The facial nerve is intact, with strong symmetric movements.  Eyes - Extraocular movements intact.  Sclera were not icteric or injected, conjunctiva were pink and moist.  Ears - right ear  canal is dry. After a small amount of debris removed from the medial canal and TM, the area concnerning for cholesteatoma on the CT was closely examined, revealing chronic granulation tissue overlying the short handle of the malleus. No keratin debris was identified in this area after debriding much of the granulation tissue under microscope. There is also a field of granulation tissue inferiorly on the TM, and there is a very small perforation in the anterior inferior quadrant through which thick mucus was suctioned.    CT Temporal bone 11/26/18 directly visualized. Evidence of bilateral chronic Eustachian tube dysfunction. Right ear with a high possibly dehiscent jugular bulb nearing the inferior insertion of the tympanic membrane. Left ear with a very low tegmen abuting the bony ear canal.    IMPRESSION:   1. Small amount of soft tissue in the region of the right Prussak's  space. Retracted right tympanic membrane. Questionable erosion of the  scutum. These findings are suspicious for a cholesteatoma. Multiple  right mastoid air cells are opacified.  2. Severely retracted left tympanic membrane. No definite middle ear  mass is identified on these images. The left mastoid is poorly  aerated.    A/P - Idvinapeter Delgadillo is a 33 year old female with chronic Eustachian tube dysfunction and resultant anatomic abnormalities of the middle ear. While there is some concern for an early cholesteatoma on the CT, I do not find keratin debris on close inspection, and suspect that the soft tissue mass on the scutum is granulation tissue. I discussed that dry ear precautions are mandatory given the chronic inflammatory changes in her ear. I also discussed the option of exploratory tympanotomy and possible tympanoplasty on the right, but I recommended we continue to closely monitor her ears with another exam in 2 months. I also offered referral to an otologist for a second opinion, but she declines at this point.      Dr. Robledo  MD Latanya  Otolaryngology  [facility]      Again, thank you for allowing me to participate in the care of your patient.        Sincerely,        Meena Pelaez MD

## 2018-12-03 NOTE — PROGRESS NOTES
Diabetes Self-Management Education & Support    Diabetes Self-Management Education & Support - Professional CGM Insertion    SUBJECTIVE/OBJECTIVE  Presents for: Follow-up  Accompanied by: Self, Other  Diabetes education in the past 24mo: Yes  Focus of Visit: Healthy Eating  Diabetes type: Type 2  Disease course: Worsening  Transportation concerns: Yes  Other concerns:: None  Cultural Influences/Ethnic Background:  American    Healthy Eating  Healthy Eating Assessed Today: Yes  Meal planning: Carbohydrate counting, Smaller portions  Meals include: Breakfast, Lunch, Dinner, Snacks  Added back in some regular soda and juice 1-2 times daily  Poptarts at breakfast  Larger portions of foods at lunch/dinner      Being Active  Being Active Assessed Today: Yes (staff and foster mother have offered ride to anytime fitness; is going to try to start going more)  Barrier to exercise: Time, None    Monitoring  Monitoring Assessed Today: Yes  Did patient bring glucose meter to appointment? : No  Home Glucose (Sugar) Monitoring: 3-4 times per day  Overall Range (mg/dL): 130-140        Taking Medications  Diabetes Medication(s)     Insulin Sig    insulin glargine (LANTUS SOLOSTAR) 100 UNIT/ML pen Inject 19 units daily    Incretin Mimetic Agents (GLP-1 Receptor Agonists) Sig    dulaglutide (TRULICITY) 1.5 MG/0.5ML pen Inject 1.5 mg Subcutaneous every 7 days          Current Treatments: Insulin Injections, Oral Agent (monotherapy)    Problem Solving  Hypoglycemia Frequency: Rarely (Reports some numbers in the 70s)    Reducing Risks  Reducing Risks Assessed Today: No    Healthy Coping  Healthy Coping Assessed Today: Yes  Emotional response to diabetes: Ready to learn  Informal Support system:: Friends, Family, Other  Stage of change: PREPARATION (Decided to change - considering how)  Support resources: Websites, In-person Offerings  Patient Activation Measure Survey Score:  EMY Score (Last Two) 5/10/2018   EMY Raw Score 35   Activation  Score 72.1   EMY Level 3         ASSESSMENT  Patient brought glucometer today.  Glucometer readings not fully matching previous handwritten records.   A1c elevated; seeing higher post prandial numbers.  Patient reports slipping on diet and adding more meals with higher levels of carbohydrate and sugar sweetened beverages.  Started a LibrePro continuous glucose monitor today and patient will continue food records for 2 more weeks.  Set goal of no sugar sweetened beverages for 2 weeks.  Reviewed portion control and moderation of foods.  Increased basal insulin by 10% from 19 to 21 units due to fasting blood sugars > 130mg/dL.    Updated MD on the above.     Lab Results   Component Value Date    A1C 9.0 11/30/2018    A1C 8.7 11/26/2018    A1C 7.2 06/21/2018    A1C 7.3 04/11/2018    A1C 10.0 01/19/2018     INTERVENTION/Patient seen today for Professional CGM Insertion:   Professional continuous glucose monitor insertion completed with no bleeding or resistance at insertion site.   Sensor Type: LibrePro  Lot #: [id number]  Serial #: [id number]  Expiration Date: 03/31/19  Indication(s) for CGM Study: Unexplained fluctuations in glucose values     Diabetes knowledge and skills assessment:   Patient is knowledgeable in diabetes management concepts related to: Monitoring and Taking Medication  Patient needs further education on the following diabetes management concepts: Healthy Eating, Being Active, Monitoring, Taking Medication, Problem Solving, Reducing Risks and Healthy Coping  Based on learning assessment above, most appropriate setting for further diabetes education would be: Individual setting.    Education provided today on:  AADE Self-Care Behaviors:  Healthy Eating: consistency in amount, composition, and timing of food intake and portion control  Being Active: relationship to blood glucose  Monitoring: individual blood glucose targets and frequency of monitoring  Healthy Coping: benefits of making appropriate  lifestyle changes and utilize support systems    WRITTEN AND VERBAL INFORMATION GIVEN TO SUPPORT UNDERSTANDING OF:   LibrePro CGM: Sensor insertion, intention of monitoring for 14 days. Keep records of BG, food intake, exercise, and medication dosing during wear.     Opportunities for ongoing education and support in diabetes-self management were discussed.  Pt verbalized understanding of concepts discussed and recommendations provided today.       Education Materials Provided:  BG Log Sheet      PLAN  See Patient Instructions for co-developed, patient-stated behavior change goals.  AVS printed and provided to patient today. See Follow-Up section for recommended follow-up.    Dolly Riley RD, LD, CDE  Diabetes Education    Time Spent: 30 minutes (DSME + professional continuous glucose monitor insertion)   Encounter Type: Individual

## 2018-12-03 NOTE — MR AVS SNAPSHOT
After Visit Summary   12/3/2018    Divina Delgadillo    MRN: [id number]           Patient Information     Date Of Birth          1985        Visit Information        Provider Department      12/3/2018 2:30 PM Meena Pelaez MD [facility]        Today's Diagnoses     Chronic Eustachian tube dysfunction, bilateral    -  1      Care Instructions    .Per physician's instructions            Follow-ups after your visit        Additional Services     AUDIOLOGY ADULT REFERRAL       Your provider has referred you to: [facility] [phone]   [email or web address]    Specialty Testing:  Audiogram w/Tymps and Reflexes (Comprehensive Audiology Evaluation)                  Your next 10 appointments already scheduled     Dec 05, 2018  3:30 PM CST   [facility]    [address] [facility] [id number] [phone]           Note: this is not an onsite visit; there is no need to come to the facility.            Dec 17, 2018  3:00 PM CST   Diabetes Education with [place] [facility]   [facility] [place] ([facility])    [address] [address] [phone]            Jan 14, 2019 10:00 AM CST   LAB with [facility]LO[facility] Critical access hospital ([facility])    [address] [place] [id number] [phone]           Please do not eat 10-12 hours before your appointment if you are coming in fasting for labs on lipids, cholesterol, or glucose (sugar). This does not apply to pregnant women. Water, hot tea and black coffee (with nothing added) are okay. Do not drink other fluids, diet soda or chew gum.            Jan 14, 2019 10:30 AM CST   Return Visit with Sánchez Dias MD   [facility]    [address] [place] [address]  [address]  [place] [id number] [phone]              Who to contact     If you have questions or need follow up information about today's clinic visit or your schedule please contact [facility] directly at [phone].  Normal or non-critical lab and imaging results will be communicated to you by MyChart, letter or phone within 4 business days after the clinic has received the results. If you do not hear from us within 7 days, please contact the clinic through MyChart or phone. If you have a critical or abnormal lab result, we will notify you by phone as soon as possible.  Submit refill requests through [employer] or call your pharmacy and they will forward the refill request to us. Please allow 3 business days for your refill to be completed.          Additional Information About Your Visit        Care EveryWhere ID     This is your Care EveryWhere ID. This could be used by other organizations to access your [facility] medical records  [id number]        Your Vitals Were     Pulse Temperature BMI (Body Mass Index)             109 98.2  F (36.8  C) (Oral) 34.16 kg/m2          Blood Pressure from Last 3 Encounters:   12/03/18 139/72   11/27/18 138/60   11/14/18 127/71    Weight from Last 3 Encounters:   12/03/18 90.3 kg (199 lb)   11/27/18 90.3 kg (199 lb)   11/14/18 88.9 kg (196 lb)              We Performed the Following     AUDIOLOGY ADULT REFERRAL        Primary Care Provider Office Phone # Fax #    Jaleesa VERONIQUE [place] [phone] [address] [facility] [address]        Goals        General    Monitoring (pt-stated)     Notes - Note created  11/5/2018  3:32 PM by Dolly Riley RD    My Goal: I will log all my meals at least 5 days a week     What I need to meet my goal: keep food logs nearby    I plan to meet my goal by this date: 30 days         Problem Solving (pt-stated)     Notes - Note created  8/27/2018  3:52 PM by Dolly Riley RD    Goal  Statement: add in pre dinner blood sugar check M-W-F  Measure of Success: review of blood sugar logs in 1 month  Supportive Steps to Achieve:  Highlight days to check before dinner   Barriers: remembering  Strengths: You already are checking your blood sugar very well   Date to Achieve By: 1 month, next visit   Patient expressed understanding of goal: yes            Equal Access to Services     FANG HAY : Hadii aad ku hadyenniferlogan Santiago, waaxda luqadaha, qaybta kaalmabj dhillon, aaron cheathamsamanthabambi echeverria . So [facility] [phone].    ATENCIÓN: Si habla español, tiene a gil disposición servicios gratuitos de asistencia lingüística. Llame al [phone].    We comply with applicable federal civil rights laws and [place] laws. We do not discriminate on the basis of race, color, national origin, age, disability, sex, sexual orientation, or gender identity.            Thank you!     Thank you for choosing [facility]  for your care. Our goal is always to provide you with excellent care. Hearing back from our patients is one way we can continue to improve our services. Please take a few minutes to complete the written survey that you may receive in the mail after your visit with us. Thank you!             Your Updated Medication List - Protect others around you: Learn how to safely use, store and throw away your medicines at www.disposemymeds.org.          This list is accurate as of 12/3/18  5:51 PM.  Always use your most recent med list.                   Brand Name Dispense Instructions for use Diagnosis    ABILIFY 30 MG tablet   Generic drug:  ARIPiprazole      Take 30 mg by mouth daily        albuterol 108 (90 Base) MCG/ACT inhaler    PROAIR HFA/PROVENTIL HFA/VENTOLIN HFA    1 Inhaler    Inhale 2 puffs into the lungs every 6 hours as needed for shortness of breath / dyspnea or wheezing    Mild intermittent asthma with acute exacerbation       amLODIPine 5 MG tablet    NORVASC    90  tablet    Take 1 tablet (5 mg) by mouth daily    Essential hypertension       blood glucose monitoring lancets     100 each    Use to test blood sugar 3 times daily    Type 2 diabetes mellitus with stage 3 chronic kidney disease, with long-term current use of insulin (H)       blood glucose monitoring test strip    NO BRAND SPECIFIED    100 each    1 strip by In Vitro route 3 times daily    Type 2 diabetes mellitus with stage 3 chronic kidney disease, with long-term current use of insulin (H)       chlorthalidone 25 MG tablet    HYGROTON     Take 12.5 mg by mouth        cloZAPine 200 MG tablet    CLOZARIL     Take 200 mg by mouth 2 times daily        continuous blood glucose monitoring sensor     3 each    For use with Freestyle Yash Flash  for continuous monitioring of blood glucose levels. Replace sensor every 10 days.    Type 2 diabetes mellitus with stage 3 chronic kidney disease, with long-term current use of insulin (H)       dulaglutide 1.5 MG/0.5ML pen    TRULICITY    6 mL    Inject 1.5 mg Subcutaneous every 7 days    Type 2 diabetes mellitus with stage 3 chronic kidney disease, without long-term current use of insulin (H)       FLUoxetine 20 MG capsule    PROzac     Take 20 mg by mouth daily        FREESTYLE YASH READER Nakia     1 Device    1 Device 3 times daily as needed    Type 2 diabetes mellitus with stage 3 chronic kidney disease, with long-term current use of insulin (H)       insulin glargine 100 UNIT/ML pen    LANTUS SOLOSTAR    15 mL    Inject 19 units daily    Type 2 diabetes mellitus with stage 3 chronic kidney disease, with long-term current use of insulin (H)       lamoTRIgine 25 MG tablet    LaMICtal     Take 1 tablet by mouth at bedtime x2 weeks, then 2 tablets at bedtime thereafter        lisinopril 5 MG tablet    PRINIVIL/ZESTRIL    90 tablet    Take 1 tablet (5 mg) by mouth daily    Essential hypertension       loratadine 10 MG tablet    CLARITIN    90 tablet    Take 1 tablet  (10 mg) by mouth every morning    Chronic seasonal allergic rhinitis, unspecified trigger       montelukast 10 MG tablet    SINGULAIR    90 tablet    Take 1 tablet (10 mg) by mouth At Bedtime    Mild intermittent asthma with acute exacerbation       omeprazole 20 MG DR capsule    priLOSEC    60 capsule    Take 1 capsule (20 mg) by mouth daily    Gastroesophageal reflux disease without esophagitis       ranitidine 300 MG tablet    ZANTAC    90 tablet    Take 1 tablet (300 mg) by mouth At Bedtime    Gastroesophageal reflux disease without esophagitis       simvastatin 20 MG tablet    ZOCOR     Take 20 mg by mouth        ULTICARE MINI 31G X 6 MM miscellaneous   Generic drug:  insulin pen needle      AS DIRECTED WITH LANTUS PENS        ULTILET SHARPS CONTAINER 1QT Misc     1 each    1 Device as needed    Type 2 diabetes mellitus with stage 3 chronic kidney disease, without long-term current use of insulin (H)

## 2018-12-03 NOTE — NURSING NOTE
"Initial /72 (BP Location: Right arm, Patient Position: Chair, Cuff Size: Adult Regular)  Pulse 109  Temp 98.2  F (36.8  C) (Oral)  Wt 90.3 kg (199 lb)  BMI 34.16 kg/m2 Estimated body mass index is 34.16 kg/(m^2) as calculated from the following:    Height as of 11/5/18: 1.626 m (5' 4\").    Weight as of this encounter: 90.3 kg (199 lb). .    June Burroughs LPN    "

## 2018-12-05 ENCOUNTER — PATIENT OUTREACH (OUTPATIENT)
Dept: NURSING | Facility: CLINIC | Age: 33
End: 2018-12-05

## 2018-12-05 DIAGNOSIS — E11.22 TYPE 2 DIABETES MELLITUS WITH STAGE 3 CHRONIC KIDNEY DISEASE, WITH LONG-TERM CURRENT USE OF INSULIN (H): Primary | ICD-10-CM

## 2018-12-05 DIAGNOSIS — N18.30 TYPE 2 DIABETES MELLITUS WITH STAGE 3 CHRONIC KIDNEY DISEASE, WITH LONG-TERM CURRENT USE OF INSULIN (H): Primary | ICD-10-CM

## 2018-12-05 DIAGNOSIS — Z79.4 TYPE 2 DIABETES MELLITUS WITH STAGE 3 CHRONIC KIDNEY DISEASE, WITH LONG-TERM CURRENT USE OF INSULIN (H): Primary | ICD-10-CM

## 2018-12-05 PROCEDURE — 99207 ZZC HEALTH COACHING, NO CHARGE: CPT

## 2018-12-05 RX ORDER — BLOOD-GLUCOSE METER
1 EACH MISCELLANEOUS DAILY
Qty: 1 KIT | Refills: 0 | COMMUNITY
Start: 2018-12-05 | End: 2019-10-10

## 2018-12-05 NOTE — PROGRESS NOTES
December 5, 2018    Health Coaching Progress Note    Patient Name: Divina Delgadillo Date: December 5, 2018      Session Length: 30      DATA    PRM Master Survey Scores Reviewed: Yes    Core Healthy Days Survey:         EMY Score (Last Two) 5/10/2018   EMY Raw Score 35   Activation Score 72.1   EMY Level 3       PHQ-2 Score 6/14/2018 5/10/2018   PHQ-2 Total Score (Adult) - Positive if 3 or more points; Administer PHQ-9 if positive 0 1       PHQ-9 SCORE 5/23/2018 11/27/2018   PHQ-9 Total Score 2 16       Treatment Objective(s) Addressed in This Session:  Target Behavior(s): diet/weight loss    Current Stressors / Issues:  Divina notes no stressors or issues today.    What Patient Does Well:   Divina has been working on her goals around both diet and exercise.   Previous Successes:   Divina has been exercising more regularly again and currently is tracking intake and BG and insulin as she is currently doing a CGM trial.  Areas in Need of Improvement:   Divina states no new areas in need at this time and plans to continue working on her current goals at this time. Writer agrees with this plan and offers encouragement.  Barriers to Change:   No barriers, just challenging to resist temptation at times (Divina shares she declined pie this week).  Reasons for Change:   Divina wants to make changes for her health and self-esteem.  Plan/Goal for the Next 4 Weeks:   Goals Addressed as of 12/5/2018 at 4:52 PM             Today      Being Active (pt-stated)   0%    Added 12/5/18 by Mahin Penny             My Goal: I will continue doing something active or exercising most days-target 3x/wk exercise    What I need to meet my goal: a ride     I plan to meet my goal by this date: 1/2/2019          Reducing Risks (pt-stated)   0%    Added 12/5/18 by Mahin Penny             My Goal: I will continue thinking about tobacco cessation    What I need to meet my goal: nothing needed    I plan to meet my goal by this date: 1/2/2019               Intervention:  Motivational Interviewing    MI Intervention: Expressed Empathy/Understanding, Supported Autonomy, Collaboration, Evocation, Permission to raise concern or advise, Open-ended questions, Reflections: simple and complex, Change talk (evoked) and Reframe     Change Talk Expressed by the Patient: Desire to change Ability to change Reasons to change Need to change Committment to change Activation Taking steps    Provider Response to Change Talk: E - Evoked more info from patient about behavior change, A - Affirmed patient's thoughts, decisions, or attempts at behavior change, R - Reflected patient's change talk and S - Summarized patient's change talk statements    Assessment / Progress on Treatment Objective(s) / Homework:    Satisfactory progress - ACTION (Actively working towards change); Intervened by reinforcing change plan / affirming steps taken     Plan: (Homework, other):  Patient was encouraged to continue to seek condition-related information and education, as well as schedule a follow up appointment with the Health  in 4 weeks. Patient has set self-identified goals and will monitor progress until the next appointment.  Next visit scheduled for January 2 at 3:30PM.      [facility]   12/5/2018    4:52 PM

## 2018-12-17 ENCOUNTER — ALLIED HEALTH/NURSE VISIT (OUTPATIENT)
Dept: EDUCATION SERVICES | Facility: CLINIC | Age: 33
End: 2018-12-17
Payer: MEDICARE

## 2018-12-17 DIAGNOSIS — N18.30 TYPE 2 DIABETES MELLITUS WITH STAGE 3 CHRONIC KIDNEY DISEASE, WITH LONG-TERM CURRENT USE OF INSULIN (H): Primary | ICD-10-CM

## 2018-12-17 DIAGNOSIS — Z79.4 TYPE 2 DIABETES MELLITUS WITH STAGE 3 CHRONIC KIDNEY DISEASE, WITH LONG-TERM CURRENT USE OF INSULIN (H): Primary | ICD-10-CM

## 2018-12-17 DIAGNOSIS — E11.22 TYPE 2 DIABETES MELLITUS WITH STAGE 3 CHRONIC KIDNEY DISEASE, WITH LONG-TERM CURRENT USE OF INSULIN (H): Primary | ICD-10-CM

## 2018-12-17 PROCEDURE — G0108 DIAB MANAGE TRN  PER INDIV: HCPCS

## 2018-12-17 NOTE — PROGRESS NOTES
Diabetes Self-Management Education & Support      Diabetes Education Self-Management & Training: Follow-up and Continuous Glucose Monitor Download    Divina Delgadillo presents today for download and report review of Professional continuous glucose monitor device      Current Diabetes Management per Patient:  Diabetes Medication(s)     Insulin Sig    insulin glargine (LANTUS SOLOSTAR) 100 UNIT/ML pen Inject 19 units daily    Incretin Mimetic Agents (GLP-1 Receptor Agonists) Sig    dulaglutide (TRULICITY) 1.5 MG/0.5ML pen Inject 1.5 mg Subcutaneous every 7 days          Current Treatments: Insulin Injections, Oral Agent (monotherapy)    Most Recent A1c Result:    Lab Results   Component Value Date    A1C 9.0 11/30/2018       Continuous Glucose Monitor Interpretation     Reports:                  Consistent day-to-day patterns: Pattern of post meal hyperglycemia    Healthy Eating  Healthy Eating Assessed Today: Yes  Meal planning: Carbohydrate counting, Smaller portions  Meals include: Breakfast, Lunch, Dinner, Snacks    Being Active  Being Active Assessed Today: Yes(staff and foster mother have offered ride to [employer]; is going to try to start going more)  Barrier to exercise: Time, None    Monitoring  Monitoring Assessed Today: Yes  Did patient bring glucose meter to appointment? : No  Home Glucose (Sugar) Monitoring: 3-4 times per day  Overall Range (mg/dL): 130-140    Taking Medications  Diabetes Medication(s)     Insulin Sig    insulin glargine (LANTUS SOLOSTAR) 100 UNIT/ML pen Inject 19 units daily    Incretin Mimetic Agents (GLP-1 Receptor Agonists) Sig    dulaglutide (TRULICITY) 1.5 MG/0.5ML pen Inject 1.5 mg Subcutaneous every 7 days          Current Treatments: Insulin Injections, Oral Agent (monotherapy)    Problem Solving  Hypoglycemia Frequency: Rarely(Reports some numbers in the 70s)    Reducing Risks  Reducing Risks Assessed Today: No    Healthy Coping  Healthy Coping Assessed Today:  Yes  Emotional response to diabetes: Ready to learn  Informal Support system:: Friends, Family, Other  Stage of change: PREPARATION (Decided to change - considering how)  Support resources: Websites, In-person Offerings  Patient Activation Measure Survey Score:  EMY Score (Last Two) 5/10/2018   EMY Raw Score 35   Activation Score 72.1   EMY Level 3     Assessment:  Medication and/or insulin dosing is: accurate    Goals        General    Being Active (pt-stated)     Notes - Note created  12/5/2018  4:50 PM by Mahin Penny    My Goal: I will continue doing something active or exercising most days-target 3x/wk exercise    What I need to meet my goal: a ride     I plan to meet my goal by this date: 1/2/2019        Healthy Eating (pt-stated)     Notes - Note created  12/17/2018  3:59 PM by Dolly Riley RD    My Goal: I will bring a pre planned healthy lunch 4 out 5 days of the work week     What I need to meet my goal: pack lunch the night before     I plan to meet my goal by this date: 1 month        Monitoring (pt-stated)     Notes - Note created  11/5/2018  3:32 PM by Dolly Riley RD    My Goal: I will log all my meals at least 5 days a week     What I need to meet my goal: keep food logs nearby    I plan to meet my goal by this date: 30 days         Problem Solving (pt-stated)     Notes - Note created  8/27/2018  3:52 PM by Dolly Riley RD    Goal Statement: add in pre dinner blood sugar check M-W-F  Measure of Success: review of blood sugar logs in 1 month  Supportive Steps to Achieve:  Highlight days to check before dinner   Barriers: remembering  Strengths: You already are checking your blood sugar very well   Date to Achieve By: 1 month, next visit   Patient expressed understanding of goal: yes          Reducing Risks (pt-stated)     Notes - Note created  12/5/2018  4:51 PM by Mhain Penny    My Goal: I will continue thinking about tobacco cessation    What I need to meet my goal: nothing  needed    I plan to meet my goal by this date: 1/2/2019              INTERVENTION:   Patient forgot food records to review with the continuous glucose monitor.   Continuous glucose monitor confirms post prandial hyperglycemia and matches most recent A1c.  Patient successfully reports cutting out regular soda for this time period, but is now tryign to focus on foods.   Reports meals > 100gm carbohydrate and snacks.  Has elevations in the middle of the night and has been keeping food in room.  Discussed ideas for foods, meals, snacks, ways to decrease temptations etc.  Spent time reviewing and finding motivation for change.  House workers and roommates are supporting patient.  She reports she is going to be honest with everything now (food consumed, blood sugars etc).   Feels overwhelmed at times and finds comfort in eating foods.  Talks with the health  on 1/2 for follow up.      Consider switching to Ozempic to see if this has different results than Trulicity and will route to PCP.     Diabetes knowledge and skills assessment:     Patient is knowledgeable in diabetes management concepts related to: Monitoring and Taking Medication    Patient needs further education on the following diabetes management concepts: Healthy Eating, Being Active, Monitoring, Taking Medication, Problem Solving, Reducing Risks and Healthy Coping    Based on learning assessment above, most appropriate setting for further diabetes education would be: Individual setting.    Education provided today on:  AADE Self-Care Behaviors:  Diabetes Pathophysiology  Healthy Eating: consistency in amount, composition, and timing of food intake, weight reduction and portion control  Being Active: relationship to blood glucose  Monitoring: log and interpret results, individual blood glucose targets and frequency of monitoring  Taking Medication: action of prescribed medication, drawing up, administering and storing injectable diabetes medications,  proper site selection and rotation for injections, side effects of prescribed medications and when to take medications  Reducing Risks: major complications of diabetes and A1C - goals, relating to blood glucose levels, how often to check  Healthy Coping: recognize feelings about diagnosis, benefits of making appropriate lifestyle changes, utilize support systems and methods for coping with stress    Pt verbalized understanding of concepts discussed and recommendations provided today.       Education Materials Provided:  No new materials provided today    PLAN:  See Patient Instructions for co-developed, patient-stated behavior change goals.  AVS printed and provided to patient today. See Follow-Up section for recommended follow-up.    Dolly Riley RD, LD, CDE  Diabetes Education    Time Spent: 60 minutes  Encounter Type: Individual

## 2018-12-17 NOTE — PATIENT INSTRUCTIONS
Keep up with no regular/juice.       Water bottle - sugar free packets     - wheat bread, try different brands, fit and lite     Breakfast:   - cheerios 1.5 cups  (could leave measuring cup in the box or pre measure cheerios into ziplocks).   Use 1 cup regular cheerios and 1/2 cup honey nut.      Lunches:   Egg salad sandwich OR peanut butter sugar free jelly s/w     Sugar snap peas     Ice pack for lunch bag     Snacks:   Celery + peanut butter   String cheese  Hard boiled eggs (protein keeps you full)   Cauliflower with low fat ranch     Snack bags    Lunch:    - don't bring extra change to work so that you aren't tempted to buy things at the vending machine

## 2018-12-26 ENCOUNTER — TELEPHONE (OUTPATIENT)
Dept: EDUCATION SERVICES | Facility: CLINIC | Age: 33
End: 2018-12-26

## 2018-12-26 DIAGNOSIS — E11.22 TYPE 2 DIABETES MELLITUS WITH STAGE 3 CHRONIC KIDNEY DISEASE, WITH LONG-TERM CURRENT USE OF INSULIN (H): Primary | ICD-10-CM

## 2018-12-26 DIAGNOSIS — Z79.4 TYPE 2 DIABETES MELLITUS WITH STAGE 3 CHRONIC KIDNEY DISEASE, WITH LONG-TERM CURRENT USE OF INSULIN (H): Primary | ICD-10-CM

## 2018-12-26 DIAGNOSIS — N18.30 TYPE 2 DIABETES MELLITUS WITH STAGE 3 CHRONIC KIDNEY DISEASE, WITH LONG-TERM CURRENT USE OF INSULIN (H): Primary | ICD-10-CM

## 2018-12-26 NOTE — TELEPHONE ENCOUNTER
Spoke with Lorin, group home director.  Will try running prescription for Ozempic in 2019 as formularies often change on the year.   Lorin reports Divina is doing better this week and that numbers have come down more with diet changes.  Follow up switched from 1/21 to 1/14.     Dolly Riley RD, LD, CDE  Diabetes Education

## 2018-12-26 NOTE — TELEPHONE ENCOUNTER
Hi Dr. Rodriguez,     Please note if you approve of this plan or indicate an alternate plan.     Patient is currently on Trulicity 1.5mg.    Is it ok to switch to Ozempic once weekly if covered?   (0.25mg x 2 weeks, 0.5mg x 3 weeks and then increase to the 1mg dose if tolerating).            Thanks!  Katiana Riley RD, LD, CDE  Diabetes Education

## 2019-01-02 ENCOUNTER — PATIENT OUTREACH (OUTPATIENT)
Dept: NURSING | Facility: CLINIC | Age: 34
End: 2019-01-02

## 2019-01-02 DIAGNOSIS — E11.22 TYPE 2 DIABETES MELLITUS WITH STAGE 3 CHRONIC KIDNEY DISEASE, WITH LONG-TERM CURRENT USE OF INSULIN (H): Primary | ICD-10-CM

## 2019-01-02 DIAGNOSIS — Z79.4 TYPE 2 DIABETES MELLITUS WITH STAGE 3 CHRONIC KIDNEY DISEASE, WITH LONG-TERM CURRENT USE OF INSULIN (H): Primary | ICD-10-CM

## 2019-01-02 DIAGNOSIS — N18.30 TYPE 2 DIABETES MELLITUS WITH STAGE 3 CHRONIC KIDNEY DISEASE, WITH LONG-TERM CURRENT USE OF INSULIN (H): Primary | ICD-10-CM

## 2019-01-02 PROCEDURE — 99207 ZZC HEALTH COACHING, NO CHARGE: CPT

## 2019-01-02 NOTE — PROGRESS NOTES
January 2, 2019    Health Coaching Progress Note    Patient Name: Divina Finnhl Date: January 2, 2019      Session Length: 25      DATA    PRM Master Survey Scores Reviewed: Yes    Core Healthy Days Survey:         EMY Score (Last Two) 5/10/2018   EMY Raw Score 35   Activation Score 72.1   EMY Level 3       PHQ-2 Score 6/14/2018 5/10/2018   PHQ-2 Total Score (Adult) - Positive if 3 or more points; Administer PHQ-9 if positive 0 1       PHQ-9 SCORE 5/23/2018 11/27/2018   PHQ-9 Total Score 2 16       Treatment Objective(s) Addressed in This Session:  Target Behavior(s): diet/weight loss    Current Stressors / Issues:  Divina notes no stressors or issues at this time.    What Patient Does Well:   Divina has been limiting her intake of sweets more.  Previous Successes:   Divina notes that the CGM trial was very helpful and that she is making changes as a result of what she learned  Areas in Need of Improvement:   Divina states that the area in need of improvement at this time is going to the gym more again now. Divina has discussed this with her housemates too and they may also go.  Barriers to Change:   Divina reports no barriers today.  Reasons for Change:   Divina wants to lose weight for her health and self-esteem.  Plan/Goal for the Next 4 Weeks:   Goals Addressed as of 1/2/2019 at 3:56 PM                 Today    12/5/18      Being Active (pt-stated)   10%  0%    Added 12/5/18 by Mahin Penny     My Goal: I will continue doing something active or exercising most days-target 3x/wk exercise    What I need to meet my goal: a ride     I plan to meet my goal by this date: 2/13/2019          Reducing Risks (pt-stated)   10%  0%    Added 12/5/18 by Mahin Penny     My Goal: I will continue thinking about tobacco cessation    What I need to meet my goal: nothing needed    I plan to meet my goal by this date: 2/13/2019            Intervention:  Motivational Interviewing    MI Intervention: Expressed Empathy/Understanding,  Supported Autonomy, Collaboration, Evocation, Permission to raise concern or advise, Open-ended questions, Reflections: simple and complex, Change talk (evoked) and Reframe     Change Talk Expressed by the Patient: Desire to change Ability to change Reasons to change Committment to change Activation Taking steps    Provider Response to Change Talk: E - Evoked more info from patient about behavior change, A - Affirmed patient's thoughts, decisions, or attempts at behavior change, R - Reflected patient's change talk and S - Summarized patient's change talk statements    Assessment / Progress on Treatment Objective(s) / Homework:    Minimal progress - ACTION (Actively working towards change); Intervened by reinforcing change plan / affirming steps taken     Plan: (Homework, other):  Patient was encouraged to continue to seek condition-related information and education, as well as schedule a follow up appointment with the Health  in 2 weeks. Patient has set self-identified goals and will monitor progress until the next appointment.  Next visit scheduled for February 13 at 3:30PM.      Mahin [facility]   1/2/2019    3:56 PM

## 2019-01-03 DIAGNOSIS — N18.30 CKD (CHRONIC KIDNEY DISEASE) STAGE 3, GFR 30-59 ML/MIN (H): Primary | ICD-10-CM

## 2019-01-03 NOTE — TELEPHONE ENCOUNTER
Ozempic sent to [facility] to check coverage.    Spoke with pharmacy - Ozempic went through at 0$.  Had pharmacy remove the Trulicity prescription since this is covered.  Spoke with Lorin at the group home; Divina has 4 more pens of Trulicity and will finish this up. Will see patient and Lorin again in office before they start the Ozempic. Verbalized understanding of concepts discussed and recommendations provided.        Dolly Riley RD, LD, CDE  Diabetes Education

## 2019-01-14 ENCOUNTER — ALLIED HEALTH/NURSE VISIT (OUTPATIENT)
Dept: EDUCATION SERVICES | Facility: CLINIC | Age: 34
End: 2019-01-14
Payer: MEDICARE

## 2019-01-14 ENCOUNTER — OFFICE VISIT (OUTPATIENT)
Dept: NEPHROLOGY | Facility: CLINIC | Age: 34
End: 2019-01-14
Payer: MEDICARE

## 2019-01-14 VITALS
SYSTOLIC BLOOD PRESSURE: 147 MMHG | RESPIRATION RATE: 14 BRPM | WEIGHT: 201.1 LBS | BODY MASS INDEX: 34.33 KG/M2 | OXYGEN SATURATION: 97 % | HEART RATE: 97 BPM | HEIGHT: 64 IN | TEMPERATURE: 98.2 F | DIASTOLIC BLOOD PRESSURE: 75 MMHG

## 2019-01-14 DIAGNOSIS — N18.30 CKD (CHRONIC KIDNEY DISEASE) STAGE 3, GFR 30-59 ML/MIN (H): ICD-10-CM

## 2019-01-14 DIAGNOSIS — E11.22 TYPE 2 DIABETES MELLITUS WITH STAGE 3 CHRONIC KIDNEY DISEASE, WITHOUT LONG-TERM CURRENT USE OF INSULIN (H): ICD-10-CM

## 2019-01-14 DIAGNOSIS — N18.30 TYPE 2 DIABETES MELLITUS WITH STAGE 3 CHRONIC KIDNEY DISEASE, WITH LONG-TERM CURRENT USE OF INSULIN (H): ICD-10-CM

## 2019-01-14 DIAGNOSIS — F31.9 BIPOLAR AFFECTIVE DISORDER, REMISSION STATUS UNSPECIFIED (H): ICD-10-CM

## 2019-01-14 DIAGNOSIS — N18.30 CKD (CHRONIC KIDNEY DISEASE) STAGE 3, GFR 30-59 ML/MIN (H): Primary | ICD-10-CM

## 2019-01-14 DIAGNOSIS — N18.30 TYPE 2 DIABETES MELLITUS WITH STAGE 3 CHRONIC KIDNEY DISEASE, WITHOUT LONG-TERM CURRENT USE OF INSULIN (H): ICD-10-CM

## 2019-01-14 DIAGNOSIS — Z79.4 TYPE 2 DIABETES MELLITUS WITH STAGE 3 CHRONIC KIDNEY DISEASE, WITH LONG-TERM CURRENT USE OF INSULIN (H): ICD-10-CM

## 2019-01-14 DIAGNOSIS — E11.22 TYPE 2 DIABETES MELLITUS WITH STAGE 3 CHRONIC KIDNEY DISEASE, WITH LONG-TERM CURRENT USE OF INSULIN (H): ICD-10-CM

## 2019-01-14 DIAGNOSIS — I10 ESSENTIAL HYPERTENSION: ICD-10-CM

## 2019-01-14 LAB
ALBUMIN SERPL-MCNC: 3.6 G/DL (ref 3.4–5)
ANION GAP SERPL CALCULATED.3IONS-SCNC: 6 MMOL/L (ref 3–14)
BUN SERPL-MCNC: 25 MG/DL (ref 7–30)
CALCIUM SERPL-MCNC: 9.1 MG/DL (ref 8.5–10.1)
CHLORIDE SERPL-SCNC: 108 MMOL/L (ref 94–109)
CO2 SERPL-SCNC: 26 MMOL/L (ref 20–32)
CREAT SERPL-MCNC: 1.34 MG/DL (ref 0.52–1.04)
CREAT UR-MCNC: 139 MG/DL
GFR SERPL CREATININE-BSD FRML MDRD: 52 ML/MIN/{1.73_M2}
GLUCOSE SERPL-MCNC: 182 MG/DL (ref 70–99)
HGB BLD-MCNC: 13.2 G/DL (ref 11.7–15.7)
PHOSPHATE SERPL-MCNC: 3.2 MG/DL (ref 2.5–4.5)
POTASSIUM SERPL-SCNC: 4.7 MMOL/L (ref 3.4–5.3)
PROT UR-MCNC: 0.36 G/L
PROT/CREAT 24H UR: 0.26 G/G CR (ref 0–0.2)
PTH-INTACT SERPL-MCNC: 78 PG/ML (ref 18–80)
SODIUM SERPL-SCNC: 140 MMOL/L (ref 133–144)

## 2019-01-14 PROCEDURE — 80069 RENAL FUNCTION PANEL: CPT | Performed by: INTERNAL MEDICINE

## 2019-01-14 PROCEDURE — 99214 OFFICE O/P EST MOD 30 MIN: CPT | Performed by: INTERNAL MEDICINE

## 2019-01-14 PROCEDURE — G0108 DIAB MANAGE TRN  PER INDIV: HCPCS

## 2019-01-14 PROCEDURE — 36415 COLL VENOUS BLD VENIPUNCTURE: CPT | Performed by: INTERNAL MEDICINE

## 2019-01-14 PROCEDURE — 84156 ASSAY OF PROTEIN URINE: CPT | Performed by: INTERNAL MEDICINE

## 2019-01-14 PROCEDURE — 85018 HEMOGLOBIN: CPT | Performed by: INTERNAL MEDICINE

## 2019-01-14 PROCEDURE — 83970 ASSAY OF PARATHORMONE: CPT | Performed by: INTERNAL MEDICINE

## 2019-01-14 RX ORDER — FLURBIPROFEN SODIUM 0.3 MG/ML
SOLUTION/ DROPS OPHTHALMIC
Qty: 100 EACH | Refills: 11 | Status: SHIPPED | OUTPATIENT
Start: 2019-01-14 | End: 2019-10-15

## 2019-01-14 RX ORDER — LISINOPRIL 10 MG/1
10 TABLET ORAL DAILY
Qty: 90 TABLET | Refills: 3 | Status: SHIPPED | OUTPATIENT
Start: 2019-01-14 | End: 2019-02-14

## 2019-01-14 ASSESSMENT — MIFFLIN-ST. JEOR: SCORE: 1599.24

## 2019-01-14 ASSESSMENT — PAIN SCALES - GENERAL: PAINLEVEL: NO PAIN (0)

## 2019-01-14 NOTE — PATIENT INSTRUCTIONS
1. Measure out Raisin Bran in a cup.    1 cup = 46gm carbohydrate  (no banana)     2. Measure out plain cheerios  1.5 cups = 33gm carbohydrate (half banana    3. Measure out honey nut cheerios  1.5 cups = 44gm carbohydrate     Add a protein to breakfast:   1/2 cup cottage cheese OR oikios triple zero OR hard boiled egg   Morning star veggie sausage - 1 ana has 9gm protein & 3gm carbohydrate     English muffin + veggie sausage + avocado + refugio = 43gm carbohydrate     English muffin (24gm) + 1/2 banana 15gm + peanut butter 4gm = 43gm   Cucumbers, cauliflower, carrots, broccoli   Sugar free jello   Hard boiled egg     Ozempic Titration Plan:   1/31: 0.25mg  2/7: 0.25mg  2/14  0.5mg  2/21 0.5mg  2/28  0.5mg  March 2nd leave for [place]  tentatively March 7th 1.0mg dose     Dolly Riley RD, LD, CDE  For Diabetes Education appointments call: [phone]   For Diabetes Education Related Questions call: [phone] or email: [email or web address]

## 2019-01-14 NOTE — NURSING NOTE
"Divina Delgadillo's goals for this visit include: Return  She requests these members of her care team be copied on today's visit information: PCP    PCP: Jaleesa Velasquez    Referring Provider:  No referring provider defined for this encounter.    /75   Pulse 97   Temp 98.2  F (36.8  C)   Resp 14   Ht 1.613 m (5' 3.5\")   Wt 91.2 kg (201 lb 1.6 oz)   SpO2 97%   BMI 35.06 kg/m      Do you need any medication refills at today's visit? N    "

## 2019-01-14 NOTE — PROGRESS NOTES
01/14/19     CC: CKD Stage 3, HTN    HPI: Divina Delgadillo is a 33 year old female who presents for follow-up of CKD. To review, her hx is significant for diabetes (~ 20 years), bipolar disease - was on lithium therapy for around 6 years but since our initial visit, she has since been taken off of it. Her diabetes was previously very poorly controlled (up to 10% in Jan) improved to 7.2% in June, now up to 9% in Nov. Creatinine has been 1.31-2.35 in the past year; stable at 1.34 today. Her last UPCR was 0.21 g/g in July - she is taking 5 mg of lisinopril currently.    She reports that she is feeling well at this time. She has been very active here recently - attending [employer] 6 out of 7 days this past week. She is excited about her exercise regimen and motivated to continue. She admits to having difficulty following a healthy diet again now but is planning to see diabetic educator again today. She is not using NSAIDs but admits to using some at the time of recent upper resp infection. She follows blood pressure at home - many times it will be in the 150s (this AM for example) - lowest it will be is 139.        Allergies   Allergen Reactions     Acetaminophen Other (See Comments)     pt previously tried to overdose on it, PMD does not want pt taking per pt.      Latex      Latex Rash         Current Outpatient Medications on File Prior to Visit:  albuterol (PROAIR HFA/PROVENTIL HFA/VENTOLIN HFA) 108 (90 BASE) MCG/ACT Inhaler Inhale 2 puffs into the lungs every 6 hours as needed for shortness of breath / dyspnea or wheezing   amLODIPine (NORVASC) 5 MG tablet Take 1 tablet (5 mg) by mouth daily   ARIPiprazole (ABILIFY) 30 MG tablet Take 30 mg by mouth daily   blood glucose monitoring (NO BRAND SPECIFIED) test strip 1 strip by In Vitro route 3 times daily   blood glucose monitoring (SOFTCLIX) lancets Use to test blood sugar 3 times daily   Blood Glucose Monitoring Suppl (ACCU-CHEK GUIDE) w/Device KIT 1 Device  daily   Clozapine (CLOZARIL) 200 MG tablet Take 200 mg by mouth 2 times daily   Continuous Blood Gluc  (FREESTYLE BRIANNA READER) VANE 1 Device 3 times daily as needed   continuous blood glucose monitoring (FREESTYLE BRIANNA) sensor For use with Freestyle Brianna Flash  for continuous monitioring of blood glucose levels. Replace sensor every 10 days.   FLUoxetine (PROZAC) 20 MG capsule Take 20 mg by mouth daily   insulin glargine (LANTUS SOLOSTAR) 100 UNIT/ML pen Inject 19 units daily   lamoTRIgine (LAMICTAL) 25 MG tablet Take 1 tablet by mouth at bedtime x2 weeks, then 2 tablets at bedtime thereafter   loratadine (CLARITIN) 10 MG tablet Take 1 tablet (10 mg) by mouth every morning   montelukast (SINGULAIR) 10 MG tablet Take 1 tablet (10 mg) by mouth At Bedtime   omeprazole (PRILOSEC) 20 MG CR capsule Take 1 capsule (20 mg) by mouth daily   simvastatin (ZOCOR) 20 MG tablet Take 20 mg by mouth   ULTICARE MINI 31G X 6 MM insulin pen needle AS DIRECTED WITH LANTUS PENS   ULTILET SHARPS CONTAINER 1QT MISC 1 Device as needed   chlorthalidone (HYGROTON) 25 MG tablet Take 12.5 mg by mouth   ranitidine (ZANTAC) 300 MG tablet Take 1 tablet (300 mg) by mouth At Bedtime (Patient not taking: Reported on 1/14/2019)   Semaglutide (OZEMPIC) 0.25 or 0.5 MG/DOSE SOPN Inject 0.25mg once weekly for 2 weeks and then increase to 0.5mg once weekly (Patient not taking: Reported on 1/14/2019)   [DISCONTINUED] lisinopril (PRINIVIL/ZESTRIL) 5 MG tablet Take 1 tablet (5 mg) by mouth daily     No current facility-administered medications on file prior to visit.     Past Medical History:   Diagnosis Date     Cervical high risk HPV (human papillomavirus) test positive 6/20/2017     Depressive disorder, not elsewhere classified      DVT (deep venous thrombosis) (H)      Dysmetabolic syndrome X      Esophageal reflux     GERD     Mild intermittent asthma      Other abnormal heart sounds     Heart murmur     Other specified types of  "schizophrenia, unspecified condition      Pancreatic cancer (H)     left adrenal gland, partial pancreas, and spleen removed; no chemo or radiation.      Type II or unspecified type diabetes mellitus without mention of complication, not stated as uncontrolled      Unspecified disorder of tympanic membrane        Past Surgical History:   Procedure Laterality Date     ADRENALECTOMY       PANCREATECTOMY PARTIAL       SPLENECTOMY       SURGICAL HISTORY OF -   10/1/2000    Tympanoplasty     SURGICAL HISTORY OF -   10/1/2000    Tonsillectomy       Social History     Tobacco Use     Smoking status: Current Every Day Smoker     Packs/day: 0.50     Years: 4.00     Pack years: 2.00     Types: Cigarettes     Smokeless tobacco: Never Used     Tobacco comment: has information about quitplan   Substance Use Topics     Alcohol use: No     Drug use: No       Family History   Problem Relation Age of Onset     Respiratory Mother         ASTHMA     Allergies Mother      Depression Mother      Heart Disease Father         HEART VALVE REPLACEMENT     Hypertension Father      Diabetes Father      Depression Father      Respiratory Brother      Allergies Brother      Respiratory Sister      Allergies Sister      Depression Sister      Respiratory Sister      Allergies Sister      Depression Sister      Kidney Disease No family hx of        ROS: A 4 system review of systems was negative other than noted here or above.     Exam:  /75   Pulse 97   Temp 98.2  F (36.8  C)   Resp 14   Ht 1.613 m (5' 3.5\")   Wt 91.2 kg (201 lb 1.6 oz)   SpO2 97%   BMI 35.06 kg/m      GENERAL APPEARANCE: alert and no distress  NECK: supple, no adenopathy  RESP: lungs clear to auscultation   CV: regular rhythm, normal rate, no rub, no edema.   Extremities: no clubbing, cyanosis  SKIN: no rash  NEURO: mentation intact and speech normal  PSYCH: affect normal/bright    Results:    Orders Only on 01/14/2019   Component Date Value Ref Range Status     " Sodium 01/14/2019 140  133 - 144 mmol/L Final     Potassium 01/14/2019 4.7  3.4 - 5.3 mmol/L Final     Chloride 01/14/2019 108  94 - 109 mmol/L Final     Carbon Dioxide 01/14/2019 26  20 - 32 mmol/L Final     Anion Gap 01/14/2019 6  3 - 14 mmol/L Final     Glucose 01/14/2019 182* 70 - 99 mg/dL Final    Non Fasting     Urea Nitrogen 01/14/2019 25  7 - 30 mg/dL Final     Creatinine 01/14/2019 1.34* 0.52 - 1.04 mg/dL Final     GFR Estimate 01/14/2019 52* >60 mL/min/[1.73_m2] Final    Comment: Non  GFR Calc  Starting 12/18/2018, serum creatinine based estimated GFR (eGFR) will be   calculated using the Chronic Kidney Disease Epidemiology Collaboration   (CKD-EPI) equation.       GFR Estimate If Black 01/14/2019 60* >60 mL/min/[1.73_m2] Final    Comment:  GFR Calc  Starting 12/18/2018, serum creatinine based estimated GFR (eGFR) will be   calculated using the Chronic Kidney Disease Epidemiology Collaboration   (CKD-EPI) equation.       Calcium 01/14/2019 9.1  8.5 - 10.1 mg/dL Final     Phosphorus 01/14/2019 3.2  2.5 - 4.5 mg/dL Final     Albumin 01/14/2019 3.6  3.4 - 5.0 g/dL Final     Hemoglobin 01/14/2019 13.2  11.7 - 15.7 g/dL Final         Assessment/Plan:   1. CKD Stage 3: risk factors for kidney disease include longstanding hypertension, diabetes, as well as longterm lithium use. She is now away from lithium which is great to see given she is already at risk for diabetic nephropathy and would like to minimize risk. She has been started on lisinopril 5 mg daily thus far. I am going to increase lisinopril to 10 mg daily and ideally will continue to titrate as able. She should repeat labs in 2 weeks to assure stable kidney function and potassium. Will plan 6 month follow-up otherwise .     2. DM: had improved at time of last visit but now with A1C up to 9%. Educated again on risk of ongoing progression of kidney disease if uncontrolled. She is meeting with diabetic educator today -  hopefully we will see improved control with her new exercise routine as well.     3. Bipolar: now off of lithium and stable per her report.     4. GERD: ideally would avoid PPI therapy given increased risk of incident CKD and AIN noted with PPI therapy. Tolerating once daily dosing - I am not certain we will be able to get away from PPI completely given the severity of her sxs previously.     5. Hypertension: above goal - would like to see her blood pressure consistently less than 130/80. I am increase lisinopril to 10 mg daily - repeat labs in 2 weeks and we will touch base with her then for BP updates. If labs are stable in 2 weeks and BP remains high, would consider trial of increasing to 20 mg daily at that time but will discuss.     Patient Instructions   1. Increase lisinopril to 10 mg daily  2. Repeat labs in 2 weeks in [facility] or [place]  3. Follow-up 6 months  4. Hepatology at the [facility]     Sánchez Dias,

## 2019-01-14 NOTE — PROGRESS NOTES
"Diabetes Self-Management Education & Support    Diabetes Education Self Management & Training    SUBJECTIVE/OBJECTIVE:  Presents for: Follow-up  Accompanied by: Self, Other, Foster mother  Diabetes education in the past 24mo: Yes  Focus of Visit: Healthy Eating  Diabetes type: Type 2  Disease course: Stable  Transportation concerns: Yes  Other concerns:: None  Cultural Influences/Ethnic Background:  American    Diabetes Symptoms & Complications  Symptom course: Resolved       Patient Problem List and Family Medical History reviewed for relevant medical history, current medical status, and diabetes risk factors.    Vitals:  Wt Readings from Last 5 Encounters:   01/14/19 91.2 kg (201 lb 1.6 oz)   12/03/18 90.3 kg (199 lb)   11/27/18 90.3 kg (199 lb)   11/14/18 88.9 kg (196 lb)   11/05/18 88.9 kg (196 lb)     Estimated body mass index is 35.06 kg/m  as calculated from the following:    Height as of an earlier encounter on 1/14/19: 1.613 m (5' 3.5\").    Weight as of an earlier encounter on 1/14/19: 91.2 kg (201 lb 1.6 oz).   Last 3 BP:   BP Readings from Last 3 Encounters:   01/14/19 147/75   12/03/18 139/72   11/27/18 138/60       History   Smoking Status     Current Every Day Smoker     Packs/day: 0.50     Years: 4.00     Types: Cigarettes   Smokeless Tobacco     Never Used     Comment: has information about quitplan       Labs:  Lab Results   Component Value Date    A1C 9.0 11/30/2018     Lab Results   Component Value Date     01/14/2019     Lab Results   Component Value Date     06/21/2018     HDL Cholesterol   Date Value Ref Range Status   06/21/2018 57 >49 mg/dL Final   ]  GFR Estimate   Date Value Ref Range Status   01/14/2019 52 (L) >60 mL/min/[1.73_m2] Final     Comment:     Non  GFR Calc  Starting 12/18/2018, serum creatinine based estimated GFR (eGFR) will be   calculated using the Chronic Kidney Disease Epidemiology Collaboration   (CKD-EPI) equation.       GFR Estimate If Black "   Date Value Ref Range Status   01/14/2019 60 (L) >60 mL/min/[1.73_m2] Final     Comment:      GFR Calc  Starting 12/18/2018, serum creatinine based estimated GFR (eGFR) will be   calculated using the Chronic Kidney Disease Epidemiology Collaboration   (CKD-EPI) equation.       Lab Results   Component Value Date    CR 1.34 01/14/2019     No results found for: MICROALBUMIN    Healthy Eating  Healthy Eating Assessed Today: Yes  Meal planning: Carbohydrate counting, Smaller portions  Meals include: Breakfast, Lunch, Dinner, Snacks  Breakfast: cold cereal   Lunch: tried egg salad sandwiches and ham.  Had carrots/PB a few times.   Some snacking still from the [employer] machines  Dinner: normal - trying to watch portions  Snacks: Reprots taking all snacks out of room at night, might have some before bed   Other: Has stopped soda and is drinking more diet beverages  Beverages: Water, Diet soda, Sports drinks  Has patient met with a dietitian in the past?: Yes    Being Active  Being Active Assessed Today: Yes(staff and foster mother have offered ride to anytime fitness; is going to try to start going more)  Exercise:: Yes  Days per week of moderate to strenuous exercise (like a brisk walk): 6  On average, minutes per day of exercise at this level: 20  How intense was your typical exercise? : Light (like stretching or slow walking)(1.5 mile walk on the treadmill)  Exercise Minutes per Week: 120  Barrier to exercise: Physical limitation(Went 6 days in a row and now knee is sore,)    Monitoring  Monitoring Assessed Today: Yes  Did patient bring glucose meter to appointment? : Yes  Blood Glucose Meter: Accu-check  Home Glucose (Sugar) Monitoring: 3-4 times per day  Low Glucose Range (mg/dL):   High Glucose Range (mg/dL): >200  Overall Range (mg/dL): 140-180        Taking Medications  Diabetes Medication(s)     Insulin Sig    insulin glargine (LANTUS SOLOSTAR) 100 UNIT/ML pen Inject 19 units daily    Incretin  Mimetic Agents (GLP-1 Receptor Agonists) Sig    Semaglutide (OZEMPIC) 0.25 or 0.5 MG/DOSE SOPN Inject 0.25mg once weekly for 2 weeks and then increase to 0.5mg once weekly     Patient not taking:  Reported on 1/14/2019          Done with Trulicity on 1/24/19     Ozempic Titration Plan:   1/31: 0.25mg  2/7: 0.25mg  2/11/19 - in office follow up   2/14  0.5mg  2/21 0.5mg  2/25/18 - phone follow up & order 1.0mg dose  2/28  0.5mg  March 2nd leave for [place]  Tentatively March 7th 1.0mg dose     Taking Medication Assessed Today: Yes  Current Treatments: Insulin Injections, Oral Agent (monotherapy)  Problems taking diabetes medications regularly?: No  Diabetes medication side effects?: No  Treatment Compliance: All of the time    Problem Solving  Problem Solving Assessed Today: Yes  Hypoglycemia Frequency: Rarely(not recently)  Patient carries a carbohydrate source: Yes    Hypoglycemia symptoms  Tremors: Yes    Reducing Risks  Reducing Risks Assessed Today: No    Healthy Coping  Healthy Coping Assessed Today: Yes  Emotional response to diabetes: Ready to learn, Guilt/Self-blame  Informal Support system:: Friends, Family, Other  Stage of change: PREPARATION (Decided to change - considering how)  Support resources: Websites, In-person Offerings  Patient Activation Measure Survey Score:  EMY Score (Last Two) 5/10/2018   EMY Raw Score 35   Activation Score 72.1   EMY Level 3       Patient's most recent   Lab Results   Component Value Date    A1C 9.0 11/30/2018    is not meeting goal of <7.0  Lab Results   Component Value Date    A1C 9.0 11/30/2018    A1C 8.7 11/26/2018    A1C 7.2 06/21/2018    A1C 7.3 04/11/2018    A1C 10.0 01/19/2018       ASSESSMENT/INTERVENTION:   Blood sugar average improved over the last 4 weeks based on finger sticks.  Divina is working on diet changes and has eliminated regular soda.  Is trying to reduce high carbohydrate snacks.  Divina wants to focus on a list of goals; see AVS.  Starting with  decreasing cereal portions at breakfast and trying to pack more lunches.  Plan is to start Ozempic soon as this may have better blood sugar lowering and satiety effects.  Has increased activity over the last week and wants to continue with this; reports starting to enjoy going to the gym.  Reviewed Ozempic pen and administration.     Diabetes knowledge and skills assessment:     Patient is knowledgeable in diabetes management concepts related to: Being Active, Monitoring and Taking Medication    Patient needs further education on the following diabetes management concepts: Healthy Eating, Being Active, Reducing Risks and Healthy Coping    Based on learning assessment above, most appropriate setting for further diabetes education would be: Individual setting.    Education provided today on:  AADE Self-Care Behaviors:  Healthy Eating: consistency in amount, composition, and timing of food intake, weight reduction and portion control, label reading  Being Active: relationship to blood glucose  Taking Medication: action of prescribed medication, drawing up, administering and storing injectable diabetes medications, proper site selection and rotation for injections, side effects of prescribed medications and when to take medications  GLP-1 administration technique taught today. Patient verbalized understanding and was able to perform an accurate return demonstration of administration technique. Side effects were discussed, if patient has any abdominal pain, with or without nausea and/or vomiting, stop medication, call provider, clinic or go to the emergency room.    Opportunities for ongoing education and support in diabetes-self management were discussed.    Pt verbalized understanding of concepts discussed and recommendations provided today.       Education Materials Provided:  BG Log Sheet    PLAN:  See Patient Instructions for co-developed, patient-stated behavior change goals.  AVS printed and provided to patient  today. See Follow-Up section for recommended follow-up.    Dolly Riley RD, LD, CDE  Diabetes Education    Time Spent: 60 minutes  Encounter Type: Individual

## 2019-01-14 NOTE — Clinical Note
Ryan Truong - Can you touch base with Divina in 2 weeks (around 1/28/19) to get an update on blood pressure readings? Thank you! KW

## 2019-01-14 NOTE — PATIENT INSTRUCTIONS
1. Increase lisinopril to 10 mg daily  2. Repeat labs in 2 weeks in [facility] or [place]  3. Follow-up 6 months  4. Hepatology at [facility]

## 2019-01-17 ENCOUNTER — TELEPHONE (OUTPATIENT)
Dept: FAMILY MEDICINE | Facility: CLINIC | Age: 34
End: 2019-01-17

## 2019-01-17 DIAGNOSIS — N18.30 TYPE 2 DIABETES MELLITUS WITH STAGE 3 CHRONIC KIDNEY DISEASE, WITH LONG-TERM CURRENT USE OF INSULIN (H): Primary | ICD-10-CM

## 2019-01-17 DIAGNOSIS — Z79.4 TYPE 2 DIABETES MELLITUS WITH STAGE 3 CHRONIC KIDNEY DISEASE, WITH LONG-TERM CURRENT USE OF INSULIN (H): Primary | ICD-10-CM

## 2019-01-17 DIAGNOSIS — E11.22 TYPE 2 DIABETES MELLITUS WITH STAGE 3 CHRONIC KIDNEY DISEASE, WITH LONG-TERM CURRENT USE OF INSULIN (H): Primary | ICD-10-CM

## 2019-01-21 DIAGNOSIS — K76.0 FATTY LIVER: Primary | ICD-10-CM

## 2019-01-23 ENCOUNTER — DOCUMENTATION ONLY (OUTPATIENT)
Dept: CARE COORDINATION | Facility: CLINIC | Age: 34
End: 2019-01-23

## 2019-01-28 ENCOUNTER — TELEPHONE (OUTPATIENT)
Dept: NEPHROLOGY | Facility: CLINIC | Age: 34
End: 2019-01-28

## 2019-01-28 DIAGNOSIS — I10 ESSENTIAL HYPERTENSION: ICD-10-CM

## 2019-01-28 NOTE — TELEPHONE ENCOUNTER
"Sánchez Dias MD [place], Gail M, JEMAL Truong - Can you touch base with Divina in 2 weeks (around 1/28/19) to get an update on blood pressure readings?      Called and spoke with Pt's Foster Mom \"Lorin\" (Auth to discuss on file).  Obtained Blood Pressure readings.  Will forward to Dr. Dias for review.    iDonne Fuller LPN      Date: 1/20/19 Blood Pressure: 134/66           Pulse: not taken  Date: 1/21/19 Blood Pressure: 141/66           Pulse: not taken  Date: 1/22/19 Blood Pressure: 144/71           Pulse: not taken  Date: 1/23/19 Blood Pressure: 151/68           Pulse: not taken  Date: 1/24/19 Blood Pressure: 134/63           Pulse: not taken    Date: 1/25/19 Blood Pressure: 141/61           Pulse: not taken  Date: 1/26/19 Blood Pressure: 144/69           Pulse: not taken  Date: 1/27/19 Blood Pressure: 141/74           Pulse: not taken  Date: 1/28/19 Blood Pressure: 141/72           Pulse: not taken        "

## 2019-01-30 DIAGNOSIS — I10 ESSENTIAL HYPERTENSION: ICD-10-CM

## 2019-01-30 DIAGNOSIS — F25.0 SCHIZOAFFECTIVE DISORDER, BIPOLAR TYPE (H): ICD-10-CM

## 2019-01-30 DIAGNOSIS — Z79.899 MEDICATION MANAGEMENT: ICD-10-CM

## 2019-01-30 DIAGNOSIS — K76.0 FATTY LIVER: ICD-10-CM

## 2019-01-30 LAB
ALBUMIN SERPL-MCNC: 3.6 G/DL (ref 3.4–5)
ALP SERPL-CCNC: 184 U/L (ref 40–150)
ALT SERPL W P-5'-P-CCNC: 50 U/L (ref 0–50)
ANION GAP SERPL CALCULATED.3IONS-SCNC: 6 MMOL/L (ref 3–14)
AST SERPL W P-5'-P-CCNC: 27 U/L (ref 0–45)
BILIRUB DIRECT SERPL-MCNC: <0.1 MG/DL (ref 0–0.2)
BILIRUB SERPL-MCNC: 0.2 MG/DL (ref 0.2–1.3)
BUN SERPL-MCNC: 34 MG/DL (ref 7–30)
CALCIUM SERPL-MCNC: 9.4 MG/DL (ref 8.5–10.1)
CHLORIDE SERPL-SCNC: 105 MMOL/L (ref 94–109)
CO2 SERPL-SCNC: 22 MMOL/L (ref 20–32)
CREAT SERPL-MCNC: 1.56 MG/DL (ref 0.52–1.04)
GFR SERPL CREATININE-BSD FRML MDRD: 43 ML/MIN/{1.73_M2}
GLUCOSE SERPL-MCNC: 327 MG/DL (ref 70–99)
INR PPP: 0.95 (ref 0.86–1.14)
POTASSIUM SERPL-SCNC: 5.1 MMOL/L (ref 3.4–5.3)
PROT SERPL-MCNC: 8.2 G/DL (ref 6.8–8.8)
SODIUM SERPL-SCNC: 133 MMOL/L (ref 133–144)

## 2019-01-30 PROCEDURE — 85610 PROTHROMBIN TIME: CPT | Performed by: CLINICAL NURSE SPECIALIST

## 2019-01-30 PROCEDURE — 85025 COMPLETE CBC W/AUTO DIFF WBC: CPT | Performed by: CLINICAL NURSE SPECIALIST

## 2019-01-30 PROCEDURE — 80048 BASIC METABOLIC PNL TOTAL CA: CPT | Performed by: CLINICAL NURSE SPECIALIST

## 2019-01-30 PROCEDURE — 36415 COLL VENOUS BLD VENIPUNCTURE: CPT | Performed by: CLINICAL NURSE SPECIALIST

## 2019-01-30 PROCEDURE — 80076 HEPATIC FUNCTION PANEL: CPT | Performed by: CLINICAL NURSE SPECIALIST

## 2019-01-31 ENCOUNTER — OFFICE VISIT (OUTPATIENT)
Dept: GASTROENTEROLOGY | Facility: CLINIC | Age: 34
End: 2019-01-31
Attending: PHYSICIAN ASSISTANT
Payer: MEDICARE

## 2019-01-31 VITALS
WEIGHT: 195.2 LBS | OXYGEN SATURATION: 99 % | DIASTOLIC BLOOD PRESSURE: 79 MMHG | SYSTOLIC BLOOD PRESSURE: 139 MMHG | HEIGHT: 64 IN | BODY MASS INDEX: 33.32 KG/M2 | TEMPERATURE: 98 F | HEART RATE: 99 BPM | RESPIRATION RATE: 16 BRPM

## 2019-01-31 DIAGNOSIS — K76.0 FATTY LIVER: Primary | ICD-10-CM

## 2019-01-31 PROCEDURE — G0463 HOSPITAL OUTPT CLINIC VISIT: HCPCS | Mod: ZF

## 2019-01-31 RX ORDER — AMLODIPINE BESYLATE 5 MG/1
10 TABLET ORAL DAILY
Qty: 90 TABLET | Refills: 1 | COMMUNITY
Start: 2019-01-31 | End: 2019-02-14

## 2019-01-31 ASSESSMENT — MIFFLIN-ST. JEOR: SCORE: 1572.48

## 2019-01-31 ASSESSMENT — PAIN SCALES - GENERAL: PAINLEVEL: NO PAIN (0)

## 2019-01-31 NOTE — LETTER
1/31/2019       RE: Divina Delgadillo  [address] [address] [place] [id number]     Dear Colleague,    Thank you for referring your patient, Divina Delgadillo, to the [facility] HEPATOLOGY at [facility]. Please see a copy of my visit note below.    Hepatology Follow-up Clinic note  Divina Delgadillo   Date of Birth 1985  Date of Service 1/31/2019    Reason for follow-up: Fatty liver disease         Assessment/plan:   Divina Delgadillo is a 33 year old female with fatty liver disease. Her transaminases have improved and her Alk Phos is mildly elevated today. She otherwise has normal liver function. Over the past six months, she has demonstrated some lifestyle changes by exercising regularly. She admits that she needs to improve her nutrition to better manage her blood glucose levels. We discussed the pathophysiology and natural history of nonalcoholic fatty liver disease and cirrhosis. We discussed SAHNI which includes slow gradual weight loss, as well as optimization of risk factors including diabetes.      - Will obtain a fibrosis scan to evaluate any fibrosis  - Maintain good control of cholesterol, recommended follow up with PCP in 6 months  - Improve nutrition: emphasizing limit carbohydrates, especially simple carbohydrates.  - Continue regular exercise  - Will check AMA if Alk Phos continues to be elevated in the future   - Follow up in clinic in 6 months     Sonja Bueno PA-C   [facility] Hepatology clinic    Total time involved with patient was 30 minutes with >50% of time involved with counseling and coordination of care as noted above.     -----------------------------------------------------       HPI:   Divina Delgadillo is a 33 year old female presenting for follow-up. She presents with Lorin, her foster Mom.     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 Perdomo on June 28, 2018. Patient denies any ER visits or hospitalizations since that time. She was just started on lisinopril by Nephrology and her amlodipine dose was adjusted.     She has been going to [employer] regularly, using the treadmill and the bike. She admits that eats a lot of carbohydrates yet, eating sweets and having large portions. She does like eating a variety of vegetables. Per recall, she does have a good understanding of carbohydrate containing foods. Her most recent Hg1Ac was 9.0.     Patient denies jaundice, lower extremity edema, abdominal distension or confusion. Patient also denies melena, hematochezia or hematemesis. Patient denies  fevers, sweats or chills.    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     Medical hx Surgical hx   Past Medical History:   Diagnosis Date     Cervical high risk HPV (human papillomavirus) test positive 6/20/2017     Depressive disorder, not elsewhere classified      DVT (deep venous thrombosis) (H)      Dysmetabolic syndrome X      Esophageal reflux      Mild intermittent asthma      Other abnormal heart sounds      Other specified types of schizophrenia, unspecified condition      Pancreatic cancer (H)      Type II or unspecified type diabetes mellitus without mention of complication, not stated as uncontrolled      Unspecified disorder of tympanic membrane     Past Surgical History:   Procedure Laterality Date     ADRENALECTOMY       PANCREATECTOMY PARTIAL       SPLENECTOMY       SURGICAL HISTORY OF -   10/1/2000    Tympanoplasty     SURGICAL HISTORY OF -   10/1/2000    Tonsillectomy                 Medications:     Current Outpatient Medications   Medication     albuterol (PROAIR HFA/PROVENTIL HFA/VENTOLIN HFA) 108 (90 BASE) MCG/ACT Inhaler     ARIPiprazole (ABILIFY) 30 MG tablet     blood glucose monitoring (NO BRAND SPECIFIED) test strip     blood glucose monitoring (SOFTCLIX) lancets      "Blood Glucose Monitoring Suppl (ACCU-CHEK GUIDE) w/Device KIT     Clozapine (CLOZARIL) 200 MG tablet     Continuous Blood Gluc  (FREESTYLE BRIANNA READER) VANE     continuous blood glucose monitoring (FREESTYLE BRIANNA) sensor     dulaglutide (TRULICITY) 0.75 MG/0.5ML pen     FLUoxetine (PROZAC) 20 MG capsule     insulin glargine (LANTUS SOLOSTAR) 100 UNIT/ML pen     lamoTRIgine (LAMICTAL) 25 MG tablet     lisinopril (PRINIVIL/ZESTRIL) 10 MG tablet     loratadine (CLARITIN) 10 MG tablet     montelukast (SINGULAIR) 10 MG tablet     omeprazole (PRILOSEC) 20 MG CR capsule     ranitidine (ZANTAC) 300 MG tablet     Sodium Fluoride (PREVIDENT 5000 PLUS DT)     ULTICARE MINI 31G X 6 MM insulin pen needle     ULTILET SHARPS CONTAINER 1QT MISC     amLODIPine (NORVASC) 5 MG tablet     No current facility-administered medications for this visit.             Allergies:     Allergies   Allergen Reactions     Acetaminophen Other (See Comments)     pt previously tried to overdose on it, PMD does not want pt taking per pt.      Latex      Latex Rash            Review of Systems:   10 points ROS was obtained and highlighted in the HPI, otherwise negative.          Physical Exam:   VS:  /79 (BP Location: Right arm, Patient Position: Sitting, Cuff Size: Adult Large)   Pulse 99   Temp 98  F (36.7  C) (Oral)   Resp 16   Ht 1.613 m (5' 3.5\")   Wt 88.5 kg (195 lb 3.2 oz)   SpO2 99%   BMI 34.04 kg/m         Gen- well, NAD, A+Ox3, obese   Lym- no palpable LAD  CVS- RRR  RS- CTA  Abd- protuberant, soft, nontender. No palpable organomely, no obvious ascites.   Extr- hands normal, no LANDRY  Skin- no rash or jaundice  Neuro- no asterixis  Psych- normal mood         Data:   Reviewed in person and significant for:    Lab Results   Component Value Date     01/30/2019      Lab Results   Component Value Date    POTASSIUM 5.1 01/30/2019     Lab Results   Component Value Date    CHLORIDE 105 01/30/2019     Lab Results   Component " Value Date    CO2 22 01/30/2019     Lab Results   Component Value Date    BUN 34 01/30/2019     Lab Results   Component Value Date    CR 1.56 01/30/2019       Lab Results   Component Value Date    WBC 14.4 01/30/2019     Lab Results   Component Value Date    HGB 13.2 01/30/2019     Lab Results   Component Value Date    HCT 37.1 01/30/2019     Lab Results   Component Value Date    MCV 87 01/30/2019     Lab Results   Component Value Date     01/30/2019       Lab Results   Component Value Date    AST 27 01/30/2019     Lab Results   Component Value Date    ALT 50 01/30/2019     Lab Results   Component Value Date    BILICONJ 0.0 06/23/2009      Lab Results   Component Value Date    BILITOTAL 0.2 01/30/2019       Lab Results   Component Value Date    ALBUMIN 3.6 01/30/2019     Lab Results   Component Value Date    PROTTOTAL 8.2 01/30/2019      Lab Results   Component Value Date    ALKPHOS 184 01/30/2019       Lab Results   Component Value Date    INR 0.95 01/30/2019 6/28/2018:   US ABDOMEN:   The liver is unremarkable, without evidence for hepatic mass  or fatty infiltration. The gallbladder is unremarkable, without  evidence of cholelithiasis. Patient is nontender over the gallbladder.  No intra or extrahepatic bile duct dilatation. The common duct could  not be visualized in its entirety. Pancreas is partially obscured by  overlying bowel gas, but appears unremarkable where seen. The spleen  is not visualized, consistent with history of prior splenectomy. Both  kidneys are unremarkable. No hydronephrosis. Abdominal aorta and IVC  are segmentally seen, and are of normal caliber where visualized.    Again, thank you for allowing me to participate in the care of your patient.      Sincerely,    Sonja Bueno PA-C

## 2019-01-31 NOTE — NURSING NOTE
"Chief Complaint   Patient presents with     RECHECK     SAHNI       Vital signs:  Temp: 98  F (36.7  C) Temp src: Oral BP: 139/79 Pulse: 99   Resp: 16 SpO2: 99 %     Height: 161.3 cm (5' 3.5\") Weight: 88.5 kg (195 lb 3.2 oz)  Estimated body mass index is 34.04 kg/m  as calculated from the following:    Height as of this encounter: 1.613 m (5' 3.5\").    Weight as of this encounter: 88.5 kg (195 lb 3.2 oz).          Sue Knox [facility]  1/31/2019 10:13 AM      "

## 2019-01-31 NOTE — TELEPHONE ENCOUNTER
Sánchez Dias MD   [facility] Nephrology [facility] [place] 2 hours ago (11:01 AM)      I would increase her norvasc to 10 mg daily. If she has swelling, could try taking it in the evening or splitting the dose to 5 mg BID. Update again on BP in 2 weeks. Thanks, Zev (Routing comment)     Called and spoke with Lorin (Foster Mom).  Read back to her Dr. Dias's  message above.  She verbalized understanding and read back the instructions she wrote down.    Lorin was given clinic phone number [phone] to call back in 2 weeks with Blood pressure readings.    Encouraged to call if any further questions or concerns.      Dionne Fuller LPN

## 2019-01-31 NOTE — LETTER
1/31/2019      RE: Divina Delgadillo  [address] [address] [place] [id number]       Hepatology Follow-up Clinic note  Divina Delgadillo   Date of Birth 1985  Date of Service 1/31/2019    Reason for follow-up: Fatty liver disease         Assessment/plan:   Divina Delgadillo is a 33 year old female with fatty liver disease. Her transaminases have improved and her Alk Phos is mildly elevated today. She otherwise has normal liver function. Over the past six months, she has demonstrated some lifestyle changes by exercising regularly. She admits that she needs to improve her nutrition to better manage her blood glucose levels. We discussed the pathophysiology and natural history of nonalcoholic fatty liver disease and cirrhosis. We discussed SAHNI which includes slow gradual weight loss, as well as optimization of risk factors including diabetes.      - Will obtain a fibrosis scan to evaluate any fibrosis  - Maintain good control of cholesterol, recommended follow up with PCP in 6 months  - Improve nutrition: emphasizing limit carbohydrates, especially simple carbohydrates.  - Continue regular exercise  - Will check AMA if Alk Phos continues to be elevated in the future   - Follow up in clinic in 6 months     Sonja Bueno PA-C   [facility] Hepatology clinic    Total time involved with patient was 30 minutes with >50% of time involved with counseling and coordination of care as noted above.     -----------------------------------------------------       HPI:   Divina Delgadillo is a 33 year old female presenting for follow-up. She presents with Lorin, her foster Mom.     Dx:  Fatty Liver disease  Hx of history of pancreatic cancer: 17 cm mucinous cystadenoma, left adrenal gland cortical adenoma, S/P partial pancreatectomy/splenectomy in 2015    Patient was last seen by Dr. Perdomo on June 28, 2018. Patient denies any ER visits or hospitalizations since that time. She was just started on lisinopril by  Nephrology and her amlodipine dose was adjusted.     She has been going to [employer] regularly, using the treadmill and the bike. She admits that eats a lot of carbohydrates yet, eating sweets and having large portions. She does like eating a variety of vegetables. Per recall, she does have a good understanding of carbohydrate containing foods. Her most recent Hg1Ac was 9.0.     Patient denies jaundice, lower extremity edema, abdominal distension or confusion. Patient also denies melena, hematochezia or hematemesis. Patient denies  fevers, sweats or chills.    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     Medical hx Surgical hx   Past Medical History:   Diagnosis Date     Cervical high risk HPV (human papillomavirus) test positive 6/20/2017     Depressive disorder, not elsewhere classified      DVT (deep venous thrombosis) (H)      Dysmetabolic syndrome X      Esophageal reflux      Mild intermittent asthma      Other abnormal heart sounds      Other specified types of schizophrenia, unspecified condition      Pancreatic cancer (H)      Type II or unspecified type diabetes mellitus without mention of complication, not stated as uncontrolled      Unspecified disorder of tympanic membrane     Past Surgical History:   Procedure Laterality Date     ADRENALECTOMY       PANCREATECTOMY PARTIAL       SPLENECTOMY       SURGICAL HISTORY OF -   10/1/2000    Tympanoplasty     SURGICAL HISTORY OF -   10/1/2000    Tonsillectomy                 Medications:     Current Outpatient Medications   Medication     albuterol (PROAIR HFA/PROVENTIL HFA/VENTOLIN HFA) 108 (90 BASE) MCG/ACT Inhaler     ARIPiprazole (ABILIFY) 30 MG tablet     blood glucose monitoring (NO BRAND SPECIFIED) test strip     blood glucose monitoring (SOFTCLIX) lancets     Blood Glucose Monitoring Suppl (ACCU-CHEK GUIDE) w/Device KIT     Clozapine (CLOZARIL) 200 MG tablet     Continuous Blood Gluc  (FREESTYLE BRIANNA READER) VANE     continuous blood glucose  "monitoring (FREESTYLE BRIANNA) sensor     dulaglutide (TRULICITY) 0.75 MG/0.5ML pen     FLUoxetine (PROZAC) 20 MG capsule     insulin glargine (LANTUS SOLOSTAR) 100 UNIT/ML pen     lamoTRIgine (LAMICTAL) 25 MG tablet     lisinopril (PRINIVIL/ZESTRIL) 10 MG tablet     loratadine (CLARITIN) 10 MG tablet     montelukast (SINGULAIR) 10 MG tablet     omeprazole (PRILOSEC) 20 MG CR capsule     ranitidine (ZANTAC) 300 MG tablet     Sodium Fluoride (PREVIDENT 5000 PLUS DT)     ULTICARE MINI 31G X 6 MM insulin pen needle     ULTILET SHARPS CONTAINER 1QT MISC     amLODIPine (NORVASC) 5 MG tablet     No current facility-administered medications for this visit.             Allergies:     Allergies   Allergen Reactions     Acetaminophen Other (See Comments)     pt previously tried to overdose on it, PMD does not want pt taking per pt.      Latex      Latex Rash            Review of Systems:   10 points ROS was obtained and highlighted in the HPI, otherwise negative.          Physical Exam:   VS:  /79 (BP Location: Right arm, Patient Position: Sitting, Cuff Size: Adult Large)   Pulse 99   Temp 98  F (36.7  C) (Oral)   Resp 16   Ht 1.613 m (5' 3.5\")   Wt 88.5 kg (195 lb 3.2 oz)   SpO2 99%   BMI 34.04 kg/m         Gen- well, NAD, A+Ox3, obese   Lym- no palpable LAD  CVS- RRR  RS- CTA  Abd- protuberant, soft, nontender. No palpable organomely, no obvious ascites.   Extr- hands normal, no LANDRY  Skin- no rash or jaundice  Neuro- no asterixis  Psych- normal mood         Data:   Reviewed in person and significant for:    Lab Results   Component Value Date     01/30/2019      Lab Results   Component Value Date    POTASSIUM 5.1 01/30/2019     Lab Results   Component Value Date    CHLORIDE 105 01/30/2019     Lab Results   Component Value Date    CO2 22 01/30/2019     Lab Results   Component Value Date    BUN 34 01/30/2019     Lab Results   Component Value Date    CR 1.56 01/30/2019       Lab Results   Component Value Date "    WBC 14.4 01/30/2019     Lab Results   Component Value Date    HGB 13.2 01/30/2019     Lab Results   Component Value Date    HCT 37.1 01/30/2019     Lab Results   Component Value Date    MCV 87 01/30/2019     Lab Results   Component Value Date     01/30/2019       Lab Results   Component Value Date    AST 27 01/30/2019     Lab Results   Component Value Date    ALT 50 01/30/2019     Lab Results   Component Value Date    BILICONJ 0.0 06/23/2009      Lab Results   Component Value Date    BILITOTAL 0.2 01/30/2019       Lab Results   Component Value Date    ALBUMIN 3.6 01/30/2019     Lab Results   Component Value Date    PROTTOTAL 8.2 01/30/2019      Lab Results   Component Value Date    ALKPHOS 184 01/30/2019       Lab Results   Component Value Date    INR 0.95 01/30/2019 6/28/2018:   US ABDOMEN:   The liver is unremarkable, without evidence for hepatic mass  or fatty infiltration. The gallbladder is unremarkable, without  evidence of cholelithiasis. Patient is nontender over the gallbladder.  No intra or extrahepatic bile duct dilatation. The common duct could  not be visualized in its entirety. Pancreas is partially obscured by  overlying bowel gas, but appears unremarkable where seen. The spleen  is not visualized, consistent with history of prior splenectomy. Both  kidneys are unremarkable. No hydronephrosis. Abdominal aorta and IVC  are segmentally seen, and are of normal caliber where visualized.    Sonja Bueno PA-C

## 2019-01-31 NOTE — PROGRESS NOTES
Hepatology Follow-up Clinic note  Divina Delgadillo   Date of Birth 1985  Date of Service 1/31/2019    Reason for follow-up: Fatty liver disease         Assessment/plan:   Divina Delgadillo is a 33 year old female with fatty liver disease. Her transaminases have improved and her Alk Phos is mildly elevated today. She otherwise has normal liver function. Over the past six months, she has demonstrated some lifestyle changes by exercising regularly. She admits that she needs to improve her nutrition to better manage her blood glucose levels. We discussed the pathophysiology and natural history of nonalcoholic fatty liver disease and cirrhosis. We discussed SAHNI which includes slow gradual weight loss, as well as optimization of risk factors including diabetes.      - Will obtain a fibrosis scan to evaluate any fibrosis  - Maintain good control of cholesterol, recommended follow up with PCP in 6 months  - Improve nutrition: emphasizing limit carbohydrates, especially simple carbohydrates.  - Continue regular exercise  - Will check AMA if Alk Phos continues to be elevated in the future   - Follow up in clinic in 6 months     Sonja Bueno PA-C   [facility] Hepatology clinic    Total time involved with patient was 30 minutes with >50% of time involved with counseling and coordination of care as noted above.     -----------------------------------------------------       HPI:   Divina Delgadillo is a 33 year old female presenting for follow-up. She presents with Lorin, her foster Mom.     Dx:  Fatty Liver disease  Hx of history of pancreatic cancer: 17 cm mucinous cystadenoma, left adrenal gland cortical adenoma, S/P partial pancreatectomy/splenectomy in 2015    Patient was last seen by Dr. Perdomo on June 28, 2018. Patient denies any ER visits or hospitalizations since that time. She was just started on lisinopril by Nephrology and her amlodipine dose was adjusted.     She has been going to [employer]  regularly, using the treadmill and the bike. She admits that eats a lot of carbohydrates yet, eating sweets and having large portions. She does like eating a variety of vegetables. Per recall, she does have a good understanding of carbohydrate containing foods. Her most recent Hg1Ac was 9.0.     Patient denies jaundice, lower extremity edema, abdominal distension or confusion. Patient also denies melena, hematochezia or hematemesis. Patient denies  fevers, sweats or chills.    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     Medical hx Surgical hx   Past Medical History:   Diagnosis Date     Cervical high risk HPV (human papillomavirus) test positive 6/20/2017     Depressive disorder, not elsewhere classified      DVT (deep venous thrombosis) (H)      Dysmetabolic syndrome X      Esophageal reflux      Mild intermittent asthma      Other abnormal heart sounds      Other specified types of schizophrenia, unspecified condition      Pancreatic cancer (H)      Type II or unspecified type diabetes mellitus without mention of complication, not stated as uncontrolled      Unspecified disorder of tympanic membrane     Past Surgical History:   Procedure Laterality Date     ADRENALECTOMY       PANCREATECTOMY PARTIAL       SPLENECTOMY       SURGICAL HISTORY OF -   10/1/2000    Tympanoplasty     SURGICAL HISTORY OF -   10/1/2000    Tonsillectomy                 Medications:     Current Outpatient Medications   Medication     albuterol (PROAIR HFA/PROVENTIL HFA/VENTOLIN HFA) 108 (90 BASE) MCG/ACT Inhaler     ARIPiprazole (ABILIFY) 30 MG tablet     blood glucose monitoring (NO BRAND SPECIFIED) test strip     blood glucose monitoring (SOFTCLIX) lancets     Blood Glucose Monitoring Suppl (ACCU-CHEK GUIDE) w/Device KIT     Clozapine (CLOZARIL) 200 MG tablet     Continuous Blood Gluc  (FREESTYLE BRIANNA READER) VANE     continuous blood glucose monitoring (FREESTYLE BRIANNA) sensor     dulaglutide (TRULICITY) 0.75 MG/0.5ML pen      "FLUoxetine (PROZAC) 20 MG capsule     insulin glargine (LANTUS SOLOSTAR) 100 UNIT/ML pen     lamoTRIgine (LAMICTAL) 25 MG tablet     lisinopril (PRINIVIL/ZESTRIL) 10 MG tablet     loratadine (CLARITIN) 10 MG tablet     montelukast (SINGULAIR) 10 MG tablet     omeprazole (PRILOSEC) 20 MG CR capsule     ranitidine (ZANTAC) 300 MG tablet     Sodium Fluoride (PREVIDENT 5000 PLUS DT)     ULTICARE MINI 31G X 6 MM insulin pen needle     ULTILET SHARPS CONTAINER 1QT MISC     amLODIPine (NORVASC) 5 MG tablet     No current facility-administered medications for this visit.             Allergies:     Allergies   Allergen Reactions     Acetaminophen Other (See Comments)     pt previously tried to overdose on it, PMD does not want pt taking per pt.      Latex      Latex Rash            Review of Systems:   10 points ROS was obtained and highlighted in the HPI, otherwise negative.          Physical Exam:   VS:  /79 (BP Location: Right arm, Patient Position: Sitting, Cuff Size: Adult Large)   Pulse 99   Temp 98  F (36.7  C) (Oral)   Resp 16   Ht 1.613 m (5' 3.5\")   Wt 88.5 kg (195 lb 3.2 oz)   SpO2 99%   BMI 34.04 kg/m        Gen- well, NAD, A+Ox3, obese   Lym- no palpable LAD  CVS- RRR  RS- CTA  Abd- protuberant, soft, nontender. No palpable organomely, no obvious ascites.   Extr- hands normal, no LANDRY  Skin- no rash or jaundice  Neuro- no asterixis  Psych- normal mood         Data:   Reviewed in person and significant for:    Lab Results   Component Value Date     01/30/2019      Lab Results   Component Value Date    POTASSIUM 5.1 01/30/2019     Lab Results   Component Value Date    CHLORIDE 105 01/30/2019     Lab Results   Component Value Date    CO2 22 01/30/2019     Lab Results   Component Value Date    BUN 34 01/30/2019     Lab Results   Component Value Date    CR 1.56 01/30/2019       Lab Results   Component Value Date    WBC 14.4 01/30/2019     Lab Results   Component Value Date    HGB 13.2 01/30/2019 "     Lab Results   Component Value Date    HCT 37.1 01/30/2019     Lab Results   Component Value Date    MCV 87 01/30/2019     Lab Results   Component Value Date     01/30/2019       Lab Results   Component Value Date    AST 27 01/30/2019     Lab Results   Component Value Date    ALT 50 01/30/2019     Lab Results   Component Value Date    BILICONJ 0.0 06/23/2009      Lab Results   Component Value Date    BILITOTAL 0.2 01/30/2019       Lab Results   Component Value Date    ALBUMIN 3.6 01/30/2019     Lab Results   Component Value Date    PROTTOTAL 8.2 01/30/2019      Lab Results   Component Value Date    ALKPHOS 184 01/30/2019       Lab Results   Component Value Date    INR 0.95 01/30/2019 6/28/2018:   US ABDOMEN:   The liver is unremarkable, without evidence for hepatic mass  or fatty infiltration. The gallbladder is unremarkable, without  evidence of cholelithiasis. Patient is nontender over the gallbladder.  No intra or extrahepatic bile duct dilatation. The common duct could  not be visualized in its entirety. Pancreas is partially obscured by  overlying bowel gas, but appears unremarkable where seen. The spleen  is not visualized, consistent with history of prior splenectomy. Both  kidneys are unremarkable. No hydronephrosis. Abdominal aorta and IVC  are segmentally seen, and are of normal caliber where visualized.

## 2019-02-01 LAB
BASOPHILS # BLD AUTO: 0.2 10E9/L (ref 0–0.2)
BASOPHILS NFR BLD AUTO: 1 %
DIFFERENTIAL METHOD BLD: ABNORMAL
EOSINOPHIL # BLD AUTO: 0.2 10E9/L (ref 0–0.7)
EOSINOPHIL NFR BLD AUTO: 1.2 %
ERYTHROCYTE [DISTWIDTH] IN BLOOD BY AUTOMATED COUNT: 12.5 % (ref 10–15)
HCT VFR BLD AUTO: 37.1 % (ref 35–47)
HGB BLD-MCNC: 13.2 G/DL (ref 11.7–15.7)
IMM GRANULOCYTES # BLD: 0.2 10E9/L (ref 0–0.4)
IMM GRANULOCYTES NFR BLD: 1.6 %
LYMPHOCYTES # BLD AUTO: 4.2 10E9/L (ref 0.8–5.3)
LYMPHOCYTES NFR BLD AUTO: 29.5 %
MCH RBC QN AUTO: 30.8 PG (ref 26.5–33)
MCHC RBC AUTO-ENTMCNC: 31.3 G/DL (ref 31.5–36.5)
MCV RBC AUTO: 101 FL (ref 78–100)
MONOCYTES # BLD AUTO: 0.9 10E9/L (ref 0–1.3)
MONOCYTES NFR BLD AUTO: 6.2 %
NEUTROPHILS # BLD AUTO: 8.7 10E9/L (ref 1.6–8.3)
NEUTROPHILS NFR BLD AUTO: 60.5 %
NRBC # BLD AUTO: 0 10*3/UL
NRBC BLD AUTO-RTO: 0 /100
PLATELET # BLD AUTO: 355 10E9/L (ref 150–450)
RBC # BLD AUTO: 4.28 10E12/L (ref 3.8–5.2)
WBC # BLD AUTO: 14.4 10E9/L (ref 4–11)

## 2019-02-11 ENCOUNTER — ALLIED HEALTH/NURSE VISIT (OUTPATIENT)
Dept: EDUCATION SERVICES | Facility: CLINIC | Age: 34
End: 2019-02-11
Payer: MEDICARE

## 2019-02-11 DIAGNOSIS — N18.30 TYPE 2 DIABETES MELLITUS WITH STAGE 3 CHRONIC KIDNEY DISEASE, WITH LONG-TERM CURRENT USE OF INSULIN (H): ICD-10-CM

## 2019-02-11 DIAGNOSIS — E11.22 TYPE 2 DIABETES MELLITUS WITH STAGE 3 CHRONIC KIDNEY DISEASE, WITH LONG-TERM CURRENT USE OF INSULIN (H): ICD-10-CM

## 2019-02-11 DIAGNOSIS — Z79.4 TYPE 2 DIABETES MELLITUS WITH STAGE 3 CHRONIC KIDNEY DISEASE, WITH LONG-TERM CURRENT USE OF INSULIN (H): ICD-10-CM

## 2019-02-11 PROCEDURE — G0108 DIAB MANAGE TRN  PER INDIV: HCPCS

## 2019-02-11 NOTE — PROGRESS NOTES
"Diabetes Self-Management Education & Support    Diabetes Education Self Management & Training    SUBJECTIVE/OBJECTIVE:  Presents for: Follow-up  Accompanied by: Self, Other  Diabetes education in the past 24mo: Yes  Focus of Visit: Monitoring, Taking Medication  Diabetes type: Type 2  Disease course: Stable  Transportation concerns: Yes  Other concerns:: None  Cultural Influences/Ethnic Background:  American    Diabetes Symptoms & Complications  Fatigue: Yes(when elevated above 300mg/dL)  Weight trend: Stable     Patient Problem List and Family Medical History reviewed for relevant medical history, current medical status, and diabetes risk factors.    Vitals:  Wt Readings from Last 5 Encounters:   01/31/19 88.5 kg (195 lb 3.2 oz)   01/14/19 91.2 kg (201 lb 1.6 oz)   12/03/18 90.3 kg (199 lb)   11/27/18 90.3 kg (199 lb)   11/14/18 88.9 kg (196 lb)     Estimated body mass index is 34.04 kg/m  as calculated from the following:    Height as of 1/31/19: 1.613 m (5' 3.5\").    Weight as of 1/31/19: 88.5 kg (195 lb 3.2 oz).   Last 3 BP:   BP Readings from Last 3 Encounters:   01/31/19 139/79   01/14/19 147/75   12/03/18 139/72       History   Smoking Status     Current Every Day Smoker     Packs/day: 0.50     Years: 4.00     Types: Cigarettes   Smokeless Tobacco     Never Used     Comment: has information about quitplan       Labs:  Lab Results   Component Value Date    A1C 9.0 11/30/2018     Lab Results   Component Value Date     01/30/2019     Lab Results   Component Value Date     06/21/2018     HDL Cholesterol   Date Value Ref Range Status   06/21/2018 57 >49 mg/dL Final   ]  GFR Estimate   Date Value Ref Range Status   01/30/2019 43 (L) >60 mL/min/[1.73_m2] Final     Comment:     Non  GFR Calc  Starting 12/18/2018, serum creatinine based estimated GFR (eGFR) will be   calculated using the Chronic Kidney Disease Epidemiology Collaboration   (CKD-EPI) equation.       GFR Estimate If Black "   Date Value Ref Range Status   01/30/2019 50 (L) >60 mL/min/[1.73_m2] Final     Comment:      GFR Calc  Starting 12/18/2018, serum creatinine based estimated GFR (eGFR) will be   calculated using the Chronic Kidney Disease Epidemiology Collaboration   (CKD-EPI) equation.       Lab Results   Component Value Date    CR 1.56 01/30/2019     No results found for: MICROALBUMIN    Healthy Eating  Healthy Eating Assessed Today: Yes(did not review in detail as much this visit.  Focused on general goal setting and follow through)  Meal planning: (Varies between portion control and avoiding sweets to no meal planning)  Meals include: Breakfast, Lunch, Dinner, Snacks  Beverages: Water, Diet soda, Sports drinks(had orange juice again on the day of the 367)  Has patient met with a dietitian in the past?: Yes    Being Active  Being Active Assessed Today: Yes(hasn't gone to the fitness center in 2 weeks.  Reviewed ideas for home)  Exercise:: Currently not exercising  Barrier to exercise: Access(needs a ride to the gym)    Monitoring  Monitoring Assessed Today: Yes  Did patient bring glucose meter to appointment? : Yes  Blood Glucose Meter: Accu-check  Home Glucose (Sugar) Monitoring: 3-4 times per day  Low Glucose Range (mg/dL):   High Glucose Range (mg/dL): >200  Overall Range (mg/dL): 140-180        Taking Medications  Diabetes Medication(s)     Insulin Sig    insulin glargine (LANTUS SOLOSTAR) 100 UNIT/ML pen Inject 19 units daily     Patient taking differently:  Inject 21 Units Subcutaneous every morning     Incretin Mimetic Agents (GLP-1 Receptor Agonists) Sig    dulaglutide (TRULICITY) 0.75 MG/0.5ML pen Inject 0.75 mg Subcutaneous every 7 days      Done with Trulicity on 1/24/19 and began Ozempic 1/31/19.   (Reviewed history; med list was adjusted 1/31 at appointment and Ozempic was removed and Trulicity 0.75mg was added which is incorrect, patient was on 1.5mg historically and continues on Ozempic).   Will remove the order for Trulicity and re-add the 1.0mg dose of Ozempic.      Ozempic Titration Plan:   1/31: 0.25mg  2/7: pt did not follow written instructions see AVS 1/14 and took 0.5mg  2/14  0.5mg  2/15: phone follow up  Divina to call and leave blood sugars  (May need to increase Lantus temporarily)   2/21 0.5mg (3rd shot of 0.5mg)  2/25/18 - Phone follow up   2/28 start 1mg dose   March 2nd - patient leaves for [place]  March 7th 1.0mg dose (second shot of 1mg)    Taking Medication Assessed Today: Yes  Current Treatments: Insulin Injections, Oral Agent (monotherapy)  Problems taking diabetes medications regularly?: No  Diabetes medication side effects?: No  Treatment Compliance: All of the time    Problem Solving  Problem Solving Assessed Today: Yes  Hypoglycemia Frequency: Rarely(not recently)  Patient carries a carbohydrate source: Yes    Hypoglycemia symptoms  Tremors: Yes    Reducing Risks  Reducing Risks Assessed Today: No    Healthy Coping  Healthy Coping Assessed Today: Yes  Emotional response to diabetes: Ready to learn, Guilt/Self-blame, Anger, Uncertain  Informal Support system:: Friends, Family, Other  Stage of change: PREPARATION (Decided to change - considering how)(varies between preparation and action)  Support resources: Websites, In-person Offerings  Patient Activation Measure Survey Score:  EMY Score (Last Two) 5/10/2018   EMY Raw Score 35   Activation Score 72.1   EMY Level 3     Patient's most recent   Lab Results   Component Value Date    A1C 9.0 11/30/2018    is not meeting goal of <7.0      ASSESSMENT/INTERVENTION:   Patient based goals are set at every visit and many diet/meal ideas are brainstormed, however Divina and staff member report follow through is the hardest part.  Divina has not been able to sustain the changes to diet.  Continues to eat high carbohydrate meals and snacks, which are increasing blood sugars.  Reviewed risks of high blood sugars.  Additionally patient is in the  titration process for the Ozempic.  While this medication is being increased recommended increasing Lantus by 20% today from 21 to 25 units once daily due to elevated blood sugars mostly > 200mg/dL.   Recommended patient call in 3 days and leave blood sugar results incase further titration of the insulin is needed.  Patient to start the 1mg dose of Ozempic on 2/28/19 now and may need additional basal insulin increases until then.  (will still need to add a new order for the 1mg dose of Ozempic)     Diivna continues health coaching as well via phone visits.    Set simple/short activity goals to try at home over this week.      Diabetes knowledge and skills assessment:   Patient is knowledgeable in diabetes management concepts related to: Monitoring, Taking Medication and Problem Solving  Patient needs further education on the following diabetes management concepts: Healthy Eating, Being Active, Reducing Risks and Healthy Coping  Based on learning assessment above, most appropriate setting for further diabetes education would be: Individual setting.    Education provided today on:  AADE Self-Care Behaviors:  Diabetes Pathophysiology  Healthy Eating: portion control  Being Active: relationship to blood glucose and activity goals  Taking Medication: action of prescribed medication  Problem Solving: high blood glucose - causes, signs/symptoms, treatment and prevention, low blood glucose - causes, signs/symptoms, treatment and prevention and when to call health care provider  Healthy Coping: recognize feelings about diagnosis, utilize support systems and methods for coping with stress    Opportunities for ongoing education and support in diabetes-self management were discussed.    Pt verbalized understanding of concepts discussed and recommendations provided today.       Education Materials Provided:  BG Log Sheet    PLAN:  See Patient Instructions for co-developed, patient-stated behavior change goals.  AVS printed and  provided to patient today. See Follow-Up section for recommended follow-up.    Dolly Riley RD, DANIEL, CDE  Diabetes Education    Time Spent: 30 minutes  Encounter Type: Individual    Any diabetes medication dose changes were made via the CDE Protocol and Collaborative Practice Agreement with the patient's referring provider.

## 2019-02-11 NOTE — PATIENT INSTRUCTIONS
Increase Lantus from 21 to 25 units tomorrow morning     Call on Friday morning after you take your fasting blood sugar and leave a message    Keep watching the carbohydrates closely at meals     Movement - look up free videos on tablet & walk around yard    Make a list for heathy snacks to keep on hand     Dolly Riley RD, DANIEL, CDE  For Diabetes Education appointments call: [phone]   For Diabetes Education Related Questions call: [phone] or email: [email or web address]

## 2019-02-13 ENCOUNTER — PATIENT OUTREACH (OUTPATIENT)
Dept: NURSING | Facility: CLINIC | Age: 34
End: 2019-02-13

## 2019-02-13 DIAGNOSIS — Z79.4 TYPE 2 DIABETES MELLITUS WITH STAGE 3 CHRONIC KIDNEY DISEASE, WITH LONG-TERM CURRENT USE OF INSULIN (H): Primary | ICD-10-CM

## 2019-02-13 DIAGNOSIS — N18.30 TYPE 2 DIABETES MELLITUS WITH STAGE 3 CHRONIC KIDNEY DISEASE, WITH LONG-TERM CURRENT USE OF INSULIN (H): Primary | ICD-10-CM

## 2019-02-13 DIAGNOSIS — E11.22 TYPE 2 DIABETES MELLITUS WITH STAGE 3 CHRONIC KIDNEY DISEASE, WITH LONG-TERM CURRENT USE OF INSULIN (H): Primary | ICD-10-CM

## 2019-02-13 NOTE — PROGRESS NOTES
February 13, 2019    Health Coaching Progress Note    Patient Name: Divina Delgadillo Date: February 13, 2019      Session Length: 30      DATA    PRM Master Survey Scores Reviewed: Yes    Core Healthy Days Survey:         EMY Score (Last Two) 5/10/2018   EMY Raw Score 35   Activation Score 72.1   EMY Level 3       PHQ-2 Score 6/14/2018 5/10/2018   PHQ-2 Total Score (Adult) - Positive if 3 or more points; Administer PHQ-9 if positive 0 1       PHQ-9 SCORE 5/23/2018 11/27/2018   PHQ-9 Total Score 2 16       Treatment Objective(s) Addressed in This Session:  Target Behavior(s): diet/weight loss and disease management/lifestyle changes increasing exercise    Current Stressors / Issues:  Divina notes no stressors or issues today.    What Patient Does Well:   Divina has been working on her goals around diet and exercise since our last visit.  Previous Successes:   Divina tells writer that she has been going to [employer] almost every day and has been using the elliptical, treadmill, or bike and feels good about this.  Areas in Need of Improvement:   Divina notes no new areas in need of improvement at this time. Writer asks Divina if she is incorporating any strengthening exercise during her gym visits and she replies that she has not. We discuss this further today during our visit and writer explains that building lean muscle mass will support her weight loss in the long term as it burns more calories at rest than fat. Divina verbalizes understanding of this and shares that her aunt that she looks up to is a  and could provide guidance around this possibly. Divina shares that she may begin incorporating some strength training into her visits to the gym. Writer to follow-up next visit.  Barriers to Change:   Divina reports no barriers today.  Reasons for Change:   Divina wants to lose weight for her health and self-esteem.  Plan/Goal for the Next 4 Weeks:   Goals Addressed as of 2/13/2019 at 4:03 PM                  Today    1/2/19      Being Active (pt-stated)         Added 2/13/19 by Mahin Penny     My Goal: I will continue exercising most days of the week-targeting 3 times (minimum)-and begin incorporating strength training    What I need to meet my goal: Divina plans to discuss with her aunt who is a     I plan to meet my goal by this date: 3/20/2019    Currently going to [employer] most days        Reducing Risks (pt-stated)   0%  10%    Added 12/5/18 by Mahin Penny     My Goal: I will continue thinking about tobacco cessation    What I need to meet my goal: nothing needed    I plan to meet my goal by this date: 2/13/2019            Intervention:  Motivational Interviewing    MI Intervention: Expressed Empathy/Understanding, Supported Autonomy, Collaboration, Evocation, Permission to raise concern or advise, Open-ended questions, Reflections: simple and complex, Change talk (evoked) and Reframe     Change Talk Expressed by the Patient: Desire to change Ability to change Reasons to change Committment to change Activation Taking steps    Provider Response to Change Talk: E - Evoked more info from patient about behavior change, A - Affirmed patient's thoughts, decisions, or attempts at behavior change, R - Reflected patient's change talk and S - Summarized patient's change talk statements    Assessment / Progress on Treatment Objective(s) / Homework:    Satisfactory progress - ACTION (Actively working towards change); Intervened by reinforcing change plan / affirming steps taken     Plan: (Homework, other):  Patient was encouraged to continue to seek condition-related information and education, as well as schedule a follow up appointment with the Health  in 5 weeks. Patient has set self-identified goals and will monitor progress until the next appointment.  Next visit scheduled for March 20 at 3:30PM.      Mahin Penny, Health   2/13/2019    4:03 PM

## 2019-02-14 ENCOUNTER — OFFICE VISIT (OUTPATIENT)
Dept: FAMILY MEDICINE | Facility: CLINIC | Age: 34
End: 2019-02-14
Payer: MEDICARE

## 2019-02-14 VITALS
RESPIRATION RATE: 20 BRPM | BODY MASS INDEX: 33.22 KG/M2 | DIASTOLIC BLOOD PRESSURE: 68 MMHG | HEIGHT: 64 IN | SYSTOLIC BLOOD PRESSURE: 122 MMHG | TEMPERATURE: 98.5 F | WEIGHT: 194.6 LBS | OXYGEN SATURATION: 98 % | HEART RATE: 101 BPM

## 2019-02-14 DIAGNOSIS — J30.2 CHRONIC SEASONAL ALLERGIC RHINITIS: ICD-10-CM

## 2019-02-14 DIAGNOSIS — J45.21 MILD INTERMITTENT ASTHMA WITH ACUTE EXACERBATION: ICD-10-CM

## 2019-02-14 DIAGNOSIS — Z79.4 TYPE 2 DIABETES MELLITUS WITH STAGE 3 CHRONIC KIDNEY DISEASE, WITH LONG-TERM CURRENT USE OF INSULIN (H): Primary | ICD-10-CM

## 2019-02-14 DIAGNOSIS — E11.22 TYPE 2 DIABETES MELLITUS WITH STAGE 3 CHRONIC KIDNEY DISEASE, WITH LONG-TERM CURRENT USE OF INSULIN (H): Primary | ICD-10-CM

## 2019-02-14 DIAGNOSIS — N18.30 TYPE 2 DIABETES MELLITUS WITH STAGE 3 CHRONIC KIDNEY DISEASE, WITH LONG-TERM CURRENT USE OF INSULIN (H): Primary | ICD-10-CM

## 2019-02-14 DIAGNOSIS — M25.562 ACUTE PAIN OF LEFT KNEE: ICD-10-CM

## 2019-02-14 DIAGNOSIS — I10 ESSENTIAL HYPERTENSION: ICD-10-CM

## 2019-02-14 DIAGNOSIS — K21.9 GASTROESOPHAGEAL REFLUX DISEASE WITHOUT ESOPHAGITIS: ICD-10-CM

## 2019-02-14 LAB
BASOPHILS # BLD AUTO: 0.1 10E9/L (ref 0–0.2)
BASOPHILS NFR BLD AUTO: 0.7 %
DIFFERENTIAL METHOD BLD: ABNORMAL
EOSINOPHIL # BLD AUTO: 0.3 10E9/L (ref 0–0.7)
EOSINOPHIL NFR BLD AUTO: 1.6 %
ERYTHROCYTE [DISTWIDTH] IN BLOOD BY AUTOMATED COUNT: 13.9 % (ref 10–15)
HBA1C MFR BLD: 9 % (ref 0–5.6)
HCT VFR BLD AUTO: 39 % (ref 35–47)
HGB BLD-MCNC: 12.5 G/DL (ref 11.7–15.7)
LYMPHOCYTES # BLD AUTO: 3.8 10E9/L (ref 0.8–5.3)
LYMPHOCYTES NFR BLD AUTO: 24.8 %
MCH RBC QN AUTO: 30.6 PG (ref 26.5–33)
MCHC RBC AUTO-ENTMCNC: 32.1 G/DL (ref 31.5–36.5)
MCV RBC AUTO: 95 FL (ref 78–100)
MONOCYTES # BLD AUTO: 1 10E9/L (ref 0–1.3)
MONOCYTES NFR BLD AUTO: 6.4 %
NEUTROPHILS # BLD AUTO: 10.1 10E9/L (ref 1.6–8.3)
NEUTROPHILS NFR BLD AUTO: 66.5 %
PLATELET # BLD AUTO: 379 10E9/L (ref 150–450)
RBC # BLD AUTO: 4.09 10E12/L (ref 3.8–5.2)
WBC # BLD AUTO: 15.2 10E9/L (ref 4–11)

## 2019-02-14 PROCEDURE — 83036 HEMOGLOBIN GLYCOSYLATED A1C: CPT | Performed by: NURSE PRACTITIONER

## 2019-02-14 PROCEDURE — 85025 COMPLETE CBC W/AUTO DIFF WBC: CPT | Performed by: NURSE PRACTITIONER

## 2019-02-14 PROCEDURE — 99214 OFFICE O/P EST MOD 30 MIN: CPT | Performed by: NURSE PRACTITIONER

## 2019-02-14 PROCEDURE — 36415 COLL VENOUS BLD VENIPUNCTURE: CPT | Performed by: NURSE PRACTITIONER

## 2019-02-14 RX ORDER — MONTELUKAST SODIUM 10 MG/1
10 TABLET ORAL AT BEDTIME
Qty: 90 TABLET | Refills: 1 | Status: SHIPPED | OUTPATIENT
Start: 2019-02-14 | End: 2019-09-02

## 2019-02-14 RX ORDER — AMLODIPINE BESYLATE 5 MG/1
10 TABLET ORAL DAILY
Qty: 90 TABLET | Refills: 1 | Status: SHIPPED | OUTPATIENT
Start: 2019-02-14 | End: 2019-06-12

## 2019-02-14 RX ORDER — LORATADINE 10 MG/1
10 TABLET ORAL EVERY MORNING
Qty: 90 TABLET | Refills: 1 | Status: SHIPPED | OUTPATIENT
Start: 2019-02-14 | End: 2019-08-02

## 2019-02-14 RX ORDER — LISINOPRIL 10 MG/1
10 TABLET ORAL DAILY
Qty: 90 TABLET | Refills: 3 | Status: ON HOLD | OUTPATIENT
Start: 2019-02-14 | End: 2019-03-24

## 2019-02-14 ASSESSMENT — ASTHMA QUESTIONNAIRES
QUESTION_4 LAST FOUR WEEKS HOW OFTEN HAVE YOU USED YOUR RESCUE INHALER OR NEBULIZER MEDICATION (SUCH AS ALBUTEROL): NOT AT ALL
QUESTION_5 LAST FOUR WEEKS HOW WOULD YOU RATE YOUR ASTHMA CONTROL: WELL CONTROLLED
QUESTION_1 LAST FOUR WEEKS HOW MUCH OF THE TIME DID YOUR ASTHMA KEEP YOU FROM GETTING AS MUCH DONE AT WORK, SCHOOL OR AT HOME: NONE OF THE TIME
ACT_TOTALSCORE: 24
QUESTION_3 LAST FOUR WEEKS HOW OFTEN DID YOUR ASTHMA SYMPTOMS (WHEEZING, COUGHING, SHORTNESS OF BREATH, CHEST TIGHTNESS OR PAIN) WAKE YOU UP AT NIGHT OR EARLIER THAN USUAL IN THE MORNING: NOT AT ALL
QUESTION_2 LAST FOUR WEEKS HOW OFTEN HAVE YOU HAD SHORTNESS OF BREATH: NOT AT ALL

## 2019-02-14 ASSESSMENT — MIFFLIN-ST. JEOR: SCORE: 1569.76

## 2019-02-14 ASSESSMENT — ANXIETY QUESTIONNAIRES
4. TROUBLE RELAXING: SEVERAL DAYS
GAD7 TOTAL SCORE: 10
6. BECOMING EASILY ANNOYED OR IRRITABLE: MORE THAN HALF THE DAYS
5. BEING SO RESTLESS THAT IT IS HARD TO SIT STILL: SEVERAL DAYS
1. FEELING NERVOUS, ANXIOUS, OR ON EDGE: NOT AT ALL
7. FEELING AFRAID AS IF SOMETHING AWFUL MIGHT HAPPEN: MORE THAN HALF THE DAYS
2. NOT BEING ABLE TO STOP OR CONTROL WORRYING: SEVERAL DAYS
3. WORRYING TOO MUCH ABOUT DIFFERENT THINGS: NEARLY EVERY DAY

## 2019-02-14 ASSESSMENT — PATIENT HEALTH QUESTIONNAIRE - PHQ9: SUM OF ALL RESPONSES TO PHQ QUESTIONS 1-9: 12

## 2019-02-14 NOTE — PATIENT INSTRUCTIONS
Exercise 2-3 times per week    Increase Lantus next week by 2 units if blood sugars still above 150'

## 2019-02-14 NOTE — PROGRESS NOTES
SUBJECTIVE:   Divina Delgadillo is a 33 year old female who presents to clinic today for the following health issues: uncontrolled diabetes. Blood sugars still high, not exercising and eating a lot of carbs. States she is binge eating sometimes in order to cope with her depression, reports she is seeing therapist and did discuss this with her therapist. Reports blood sugars still in 200' despite recent increase of Lantus to 25 units and recently started Ozempic, but currently on 0.5 mg weekly, will increase to 1 mg in 2 weeks.     Diabetes Follow-up    Patient is checking blood sugars: three times daily.   Results are as follows:         am - 130's- 150          lunchtime - 200's          suppertime - 150-180     Diabetic concerns: None     Symptoms of hypoglycemia (low blood sugar): none     Paresthesias (numbness or burning in feet) or sores: No     Date of last diabetic eye exam: UTD     Diabetes Management Resources    Hypertension Follow-up      Outpatient blood pressures are being checked at home.  Results are 135/67     Low Salt Diet: not monitoring salt    BP Readings from Last 2 Encounters:   02/14/19 122/68   01/31/19 139/79     Hemoglobin A1C (%)   Date Value   02/14/2019 9.0 (H)   11/30/2018 9.0 (H)     LDL Cholesterol Calculated (mg/dL)   Date Value   06/21/2018 141 (H)   06/15/2017 192 (H)       Amount of exercise or physical activity: None    Problems taking medications regularly: No    Medication side effects: none    Diet: regular (no restrictions)    Joint Pain    Onset: 2 weeks, started when she was exercising    Description:   Location: left knee   Character: Sharp    Intensity: 6/10, some days are better than others     Progression of Symptoms: worse    Accompanying Signs & Symptoms:  Other symptoms: feels tight     History:   Previous similar pain: no       Precipitating factors:   Trauma or overuse: no     Alleviating factors:  Improved by: nothing    Therapies Tried and outcome: none  "    Problem list and histories reviewed & adjusted, as indicated.  Additional history: as documented    Labs reviewed in EPIC    Reviewed and updated as needed this visit by clinical staff  Tobacco  Allergies  Meds  Med Hx  Surg Hx  Fam Hx  Soc Hx      Reviewed and updated as needed this visit by Provider         ROS:  Constitutional, HEENT, cardiovascular, pulmonary, gi and gu systems are negative, except as otherwise noted.    OBJECTIVE:     /68 (BP Location: Left arm, Patient Position: Sitting, Cuff Size: Adult Large)   Pulse 101   Temp 98.5  F (36.9  C) (Tympanic)   Resp 20   Ht 1.613 m (5' 3.5\")   Wt 88.3 kg (194 lb 9.6 oz)   SpO2 98%   BMI 33.93 kg/m    Body mass index is 33.93 kg/m .  GENERAL: healthy, alert and no distress  EYES: Eyes grossly normal to inspection, PERRL and conjunctivae and sclerae normal  CV: regular rate and rhythm, normal S1 S2, no S3 or S4, no murmur, click or rub, no peripheral edema and peripheral pulses strong  MS: no gross musculoskeletal defects noted, no edema. Left knee exam is normal.   PSYCH: mentation appears normal, affect normal/bright    Diagnostic Test Results:  Results for orders placed or performed in visit on 02/14/19 (from the past 24 hour(s))   **A1C FUTURE anytime   Result Value Ref Range    Hemoglobin A1C 9.0 (H) 0 - 5.6 %   CBC with platelets and differential   Result Value Ref Range    WBC 15.2 (H) 4.0 - 11.0 10e9/L    RBC Count 4.09 3.8 - 5.2 10e12/L    Hemoglobin 12.5 11.7 - 15.7 g/dL    Hematocrit 39.0 35.0 - 47.0 %    MCV 95 78 - 100 fl    MCH 30.6 26.5 - 33.0 pg    MCHC 32.1 31.5 - 36.5 g/dL    RDW 13.9 10.0 - 15.0 %    Platelet Count 379 150 - 450 10e9/L    % Neutrophils 66.5 %    % Lymphocytes 24.8 %    % Monocytes 6.4 %    % Eosinophils 1.6 %    % Basophils 0.7 %    Absolute Neutrophil 10.1 (H) 1.6 - 8.3 10e9/L    Absolute Lymphocytes 3.8 0.8 - 5.3 10e9/L    Absolute Monocytes 1.0 0.0 - 1.3 10e9/L    Absolute Eosinophils 0.3 0.0 - 0.7 " 10e9/L    Absolute Basophils 0.1 0.0 - 0.2 10e9/L    Diff Method Automated Method        ASSESSMENT/PLAN:     1. Type 2 diabetes mellitus with stage 3 chronic kidney disease, with long-term current use of insulin (H)    -uncontrolled  -increase Lantus by 2 units next week, increase to 27 units,  if blood sugars still above 140-150', she will increase Ozempic in 2 weeks to 1 mg weekly, she might need to continue increasing Lantus until she starts Ozempic at 1 mg weekly   -highly recommended her to exercise at least 2-3 times per week, she recently joined fitness club   - A1C FUTURE anytime  - Semaglutide (OZEMPIC) 0.25 or 0.5 MG/DOSE SOPN; Inject 0.5 mg Subcutaneous once a week  -continue to follow with diabetic educator  -follow up in 3 months     2. Gastroesophageal reflux disease without esophagitis  -well controlled, refill provided   - ranitidine (ZANTAC) 300 MG tablet; Take 1 tablet (300 mg) by mouth At Bedtime  Dispense: 90 tablet; Refill: 1    3. Mild intermittent asthma with acute exacerbation  -well controlled, refill provided   - montelukast (SINGULAIR) 10 MG tablet; Take 1 tablet (10 mg) by mouth At Bedtime  Dispense: 90 tablet; Refill: 1    4. Chronic seasonal allergic rhinitis  - loratadine (CLARITIN) 10 MG tablet; Take 1 tablet (10 mg) by mouth every morning  Dispense: 90 tablet; Refill: 1    5. Essential hypertension  -well controlled   - lisinopril (PRINIVIL/ZESTRIL) 10 MG tablet; Take 1 tablet (10 mg) by mouth daily  Dispense: 90 tablet; Refill: 3  - amLODIPine (NORVASC) 5 MG tablet; Take 2 tablets (10 mg) by mouth daily  Dispense: 90 tablet; Refill: 1    6. Acute pain of left knee  -left knee exam is normal, pain possibly due to overuse, recently started new exercise program and spend a lot of time biking. I advised patient to continue to exercise, she can walk 30 minutes daily or more, she can do light weight lifting exercises for upper body, recommended to avoid exercises like jumping and running  since it can put more stress on her knee joints.   -lidocaine cream daily as needed for pain     See Patient Instructions    VERONIQUE Spears [facility]

## 2019-02-15 ASSESSMENT — ASTHMA QUESTIONNAIRES: ACT_TOTALSCORE: 24

## 2019-02-15 ASSESSMENT — ANXIETY QUESTIONNAIRES: GAD7 TOTAL SCORE: 10

## 2019-02-15 NOTE — TELEPHONE ENCOUNTER
Health Call Center    Phone Message    May a detailed message be left on voicemail: yes    Reason for Call: Other: Patients foster mother is calling to report 2 weeks of blood pressure readings for Dr. Dias to review.      02/01- 144/71 02/02- 154/64 02/03- 134/66 02/04- 144/75 02/05- 145/67 02/06- 141/61 02/07- 154/66  02/08- 134/61 02/09- 144/59  02/10- 154/67 02/11- 151/71 02/12- 154/72  02/13- 154/67 02/14- 124/65    Please call to advise.    Action Taken: Message routed to:  Adult Clinics: Nephrology p [phone]

## 2019-02-19 ENCOUNTER — PATIENT OUTREACH (OUTPATIENT)
Dept: EDUCATION SERVICES | Facility: CLINIC | Age: 34
End: 2019-02-19
Payer: MEDICARE

## 2019-02-19 NOTE — PROGRESS NOTES
Diabetes Follow-up    Subjective/Objective:    Divian Delgadillo sent in blood glucose log for review. Last date of communication was: 2/11/19.    Diabetes is being managed with   Diabetes Medications   Diabetes Medication(s)     Insulin Sig    insulin glargine (LANTUS SOLOSTAR PEN) 100 UNIT/ML pen Inject 25 units once daily   (allow max dose 35 units/day for titration)    Incretin Mimetic Agents (GLP-1 Receptor Agonists) Sig    Semaglutide (OZEMPIC) 0.25 or 0.5 MG/DOSE SOPN Inject 0.5 mg Subcutaneous once a week          Assessment:  CDE called Lorin- Reports Divina is currently taking 25 units daily of Lantus.  Patient checks blood sugars before breakfast, after lunch, and at bedtime.    Fasting blood glucose: 20% in target.  After lunch glucose: 75% in target.  Bedtime glucose: 40% in target.    Plan/Response:  See Patient Instructions for co-developed, patient-stated behavior change goals.  Recommend increase to insulin - Lantus 0-0-0-25 --> 0-0-0-27 as discussed at PCP visit.  Instructed them to go back down to 0-0-0-25 next week when the first 1.0mg dose of Ozempic is started.    Lorin verbalized understanding of medication plan.    Lissette Domínguez MS, RD, LD, CDE      Any diabetes medication dose changes were made via the CDE Protocol and Collaborative Practice Agreement with the patient's primary care provider. A copy of this encounter was shared with the provider.

## 2019-02-19 NOTE — PROGRESS NOTES
BG report; Lorin Mata called for pt and asked for the call back, [phone]    Date BB AL right ear  [phone]  [phone]  14 185  200  15 200 171  16 155 169  17 137 154 132  18 [phone]  19 171

## 2019-02-19 NOTE — TELEPHONE ENCOUNTER
Team: her blood pressure remains above goal of <130/80. I think we should trial increasing her lisinopril to 20 mg daily.Repeat bMP one week after making this change. She will need to RESTRICT potassium in her diet with making this change. If she does not feel she can restrict potassium, would be hesitant to make this change. Thank you< kW

## 2019-02-20 NOTE — TELEPHONE ENCOUNTER
"Spoke to patient's foster mom, Lorin regarding Dr. Dias's recommendation below. Reviewed high potassium foods. Lorin does not feel that this may be the best choice for Divina. Lorin feels that they already struggle keeping her on a diabetic diet and patient \"sneaks\" foods. Patient enjoys many of the high potassium foods. Lorin would like to know if there are other options and would like to add that patient has been working out about 3-5 times per week and had a really good January. They are hoping to keep up with physical activity.     This writer did provide education regarding Lisinopril being a good choice of medication given proteinuria and diabetes.     Will route to Dr. Dias for alternative options for blood pressure management given patient's foster mom's concerns for adherence to low potassium diet.    Shelley Vail, RN, BSN        "

## 2019-02-22 ENCOUNTER — TELEPHONE (OUTPATIENT)
Dept: EDUCATION SERVICES | Facility: CLINIC | Age: 34
End: 2019-02-22

## 2019-02-22 DIAGNOSIS — Z79.4 TYPE 2 DIABETES MELLITUS WITH STAGE 3 CHRONIC KIDNEY DISEASE, WITH LONG-TERM CURRENT USE OF INSULIN (H): Primary | ICD-10-CM

## 2019-02-22 DIAGNOSIS — E11.22 TYPE 2 DIABETES MELLITUS WITH STAGE 3 CHRONIC KIDNEY DISEASE, WITH LONG-TERM CURRENT USE OF INSULIN (H): Primary | ICD-10-CM

## 2019-02-22 DIAGNOSIS — N18.30 TYPE 2 DIABETES MELLITUS WITH STAGE 3 CHRONIC KIDNEY DISEASE, WITH LONG-TERM CURRENT USE OF INSULIN (H): Primary | ICD-10-CM

## 2019-02-22 NOTE — TELEPHONE ENCOUNTER
Called out to [facility] GILLIS 10:43 AM 2/22/2019 to review new medication orders.   Will send in the Ozempic 1.0mg dose pens now.  Patient took last dose of the 0.5mg last night.  Patient to get new batteries for her old balbnia meter otherwise does have a guide meter and can send an prescription in for these strips if the new battery doesn't work.  Advised to use the 10 sample strips with the guide meter over the weekend if needed.  Telephone follow up this coming Monday.  Plan is to start the Ozempic 1.0mg dose next week.       Dolly Riley RD, LD, CDE  Diabetes Education

## 2019-02-25 ENCOUNTER — PATIENT OUTREACH (OUTPATIENT)
Dept: EDUCATION SERVICES | Facility: CLINIC | Age: 34
End: 2019-02-25
Payer: MEDICARE

## 2019-02-25 NOTE — PROGRESS NOTES
Diabetes Follow-up    Subjective/Objective:    Divina Delgadlilo sent in blood glucose log for review.    Diabetes is being managed with   Lifestyle (diet/activity), Diabetes Medications   Diabetes Medication(s)     Insulin       insulin glargine (LANTUS SOLOSTAR PEN) 100 UNIT/ML pen    Inject 25 units once daily   (allow max dose 35 units/day for titration)    Incretin Mimetic Agents (GLP-1 Receptor Agonists)       semaglutide (OZEMPIC) 1 MG/DOSE pen    Inject 1 mg Subcutaneous every 7 days      Lantus 0-0-0-27    BG/Food Lo/23: 146 fasting, after lunch: 174, missed in the pm   :  120 fasting, after lunch: 210 & 289, after dinner: 151  : 149 fasting, after lunch 187    Assessment/Plan/Response:  Ozempic ordered by pharmacy; they are ordering in the 1mg dose and will start this on Thursday.  Continue Lantus at 0-0-0-27 and then on Thursday begin Lantus: 0-0-0-24.  Instructed to return to Lantus 0-0-0-21 with any premeal blood sugars less than 90mg/dL while in [place].  Follow-up in office in march.     Rica updated that she set new goals at her care team conference: reduce blood sugars, start doing before bed blood sugars and start eating on a smaller plate.       Dolly Riley RD, LD, CDE  Diabetes Education    Any diabetes medication dose changes were made via the CDE Protocol and Collaborative Practice Agreement with the patient's primary care provider.

## 2019-02-26 RX ORDER — CARVEDILOL 6.25 MG/1
6.25 TABLET ORAL 2 TIMES DAILY WITH MEALS
Qty: 60 TABLET | Refills: 1 | Status: SHIPPED | OUTPATIENT
Start: 2019-02-26 | End: 2019-04-04

## 2019-02-26 NOTE — TELEPHONE ENCOUNTER
Per Dr. Dias:  Ok - I think I would start carvedilol 6.25 mg BID. Goal -130. If she finds her blood pressure going low with her ongoing increased exercise regimen, then we can reevaluate her regimen going forward.     Spoke to Lorin who will have her staff implement this change. Blood pressures are checked daily and will call back in 2 weeks to report BP readings and HR. Advised to call sooner with symptoms, BP outside of goal range, consistently or HR less than 55 consisently.     Shelley aVil, RN, BSN  Nephrology Care Coordinator  [facility]

## 2019-03-12 ENCOUNTER — TELEPHONE (OUTPATIENT)
Dept: ONCOLOGY | Facility: CLINIC | Age: 34
End: 2019-03-12

## 2019-03-12 NOTE — TELEPHONE ENCOUNTER
Tobacco Treatment Team at the [facility] [place] [facility] spoke with MsElke Lopesbradharriet on 3/12/2019 regarding the tobacco cessation program and she undecided to participate with the program.We will call patient at another time to discuss tobacco cessation program.    Pt not ready to quit smoking at this time, maybe in the future.

## 2019-03-13 ENCOUNTER — ALLIED HEALTH/NURSE VISIT (OUTPATIENT)
Dept: EDUCATION SERVICES | Facility: CLINIC | Age: 34
End: 2019-03-13
Payer: MEDICARE

## 2019-03-13 DIAGNOSIS — N18.30 TYPE 2 DIABETES MELLITUS WITH STAGE 3 CHRONIC KIDNEY DISEASE, WITH LONG-TERM CURRENT USE OF INSULIN (H): Primary | ICD-10-CM

## 2019-03-13 DIAGNOSIS — E11.22 TYPE 2 DIABETES MELLITUS WITH STAGE 3 CHRONIC KIDNEY DISEASE, WITH LONG-TERM CURRENT USE OF INSULIN (H): Primary | ICD-10-CM

## 2019-03-13 DIAGNOSIS — Z79.4 TYPE 2 DIABETES MELLITUS WITH STAGE 3 CHRONIC KIDNEY DISEASE, WITH LONG-TERM CURRENT USE OF INSULIN (H): Primary | ICD-10-CM

## 2019-03-13 PROCEDURE — G0108 DIAB MANAGE TRN  PER INDIV: HCPCS

## 2019-03-13 NOTE — Clinical Note
Hello, Significant improvement in blood sugars with the change to Ozempic and getting to the 1mg dose.  Divina reports feeling full and has reduced portion sizes.  She is motivated right now and focusing on activity next.  Mahin - she really enjoys your health coaching visits, Thanks!Ba Riley RD, LD, CDEDiabetes Education

## 2019-03-13 NOTE — PROGRESS NOTES
"Diabetes Follow-up  Diabetes Self-Management Education & Support    Diabetes Education Self Management & Training    SUBJECTIVE/OBJECTIVE:  Presents for: Follow-up  Accompanied by: Self, Other  Diabetes education in the past 24mo: Yes  Focus of Visit: Monitoring, Taking Medication  Diabetes type: Type 2  Disease course: Improving  Transportation concerns: Yes  Other concerns:: None  Cultural Influences/Ethnic Background:  American    Diabetes Symptoms & Complications  Fatigue: No  Symptom course: Resolved  Weight trend: Stable      Patient Problem List and Family Medical History reviewed for relevant medical history, current medical status, and diabetes risk factors.    Vitals:  Wt Readings from Last 5 Encounters:   02/14/19 88.3 kg (194 lb 9.6 oz)   01/31/19 88.5 kg (195 lb 3.2 oz)   01/14/19 91.2 kg (201 lb 1.6 oz)   12/03/18 90.3 kg (199 lb)   11/27/18 90.3 kg (199 lb)     Estimated body mass index is 33.93 kg/m  as calculated from the following:    Height as of 2/14/19: 1.613 m (5' 3.5\").    Weight as of 2/14/19: 88.3 kg (194 lb 9.6 oz).   Last 3 BP:   BP Readings from Last 3 Encounters:   02/14/19 122/68   01/31/19 139/79   01/14/19 147/75       History   Smoking Status     Current Every Day Smoker     Packs/day: 0.50     Years: 4.00     Types: Cigarettes   Smokeless Tobacco     Never Used     Comment: has information about quitplan       Labs:  Lab Results   Component Value Date    A1C 9.0 02/14/2019     Lab Results   Component Value Date     01/30/2019     Lab Results   Component Value Date     06/21/2018     HDL Cholesterol   Date Value Ref Range Status   06/21/2018 57 >49 mg/dL Final   ]  GFR Estimate   Date Value Ref Range Status   01/30/2019 43 (L) >60 mL/min/[1.73_m2] Final     Comment:     Non  GFR Calc  Starting 12/18/2018, serum creatinine based estimated GFR (eGFR) will be   calculated using the Chronic Kidney Disease Epidemiology Collaboration   (CKD-EPI) equation.   "     GFR Estimate If Black   Date Value Ref Range Status   01/30/2019 50 (L) >60 mL/min/[1.73_m2] Final     Comment:      GFR Calc  Starting 12/18/2018, serum creatinine based estimated GFR (eGFR) will be   calculated using the Chronic Kidney Disease Epidemiology Collaboration   (CKD-EPI) equation.       Lab Results   Component Value Date    CR 1.56 01/30/2019     No results found for: MICROALBUMIN    Healthy Eating  Healthy Eating Assessed Today: Yes  Patient on a regular basis: Eats 3 meals a day  Meal planning: Smaller portions(Varies between portion control and avoiding sweets to no meal planning)  Meals include: Breakfast, Lunch, Dinner, Snacks  Beverages: Water, Diet soda, milk   Has patient met with a dietitian in the past?: Yes    Breakfast - toast with milk OR cereal (all portions smaller).  Water with medications    Lunch - (not going to vending machine)  - egg salad sandwich or fruit, sugar free jello   Trying not to eat when getting home from work   Dinner - normal dinner   Milk as a snack,   Not eating after 7pm     Being Active  Being Active Assessed Today: Yes(More motivated about [employer]; wants to set goal of biking there this spring when the weather improves)  Exercise:: Currently not exercising  Barrier to exercise: Access(needs a ride to the gym)    Monitoring  Monitoring Assessed Today: Yes  Did patient bring glucose meter to appointment? : Yes  Blood Glucose Meter: Accu-check  Home Glucose (Sugar) Monitoring: 3-4 times per day  Low Glucose Range (mg/dL):   High Glucose Range (mg/dL): >200  Overall Range (mg/dL): 140-180        Taking Medications  Diabetes Medication(s)     Insulin       insulin glargine (LANTUS SOLOSTAR PEN) 100 UNIT/ML pen    Inject 25 units once daily   (allow max dose 35 units/day for titration)    Incretin Mimetic Agents (GLP-1 Receptor Agonists)       semaglutide (OZEMPIC) 1 MG/DOSE pen    Inject 1 mg Subcutaneous every 7 days      Lantus  0-0-0-24     Taking Medication Assessed Today: Yes  Current Treatments: Insulin Injections, Oral Agent (monotherapy)  Problems taking diabetes medications regularly?: No  Diabetes medication side effects?: No  Treatment Compliance: All of the time    Problem Solving  Problem Solving Assessed Today: Yes  Hypoglycemia Frequency: Rarely(not recently)  Patient carries a carbohydrate source: Yes  Hypoglycemia symptoms  Tremors: Yes      Reducing Risks  Reducing Risks Assessed Today: No    Healthy Coping  Healthy Coping Assessed Today: Yes  Emotional response to diabetes: Ready to learn  Informal Support system:: Friends, Family, Other  Stage of change: ACTION (Actively working towards change)(varies between preparation and action)  Support resources: Websites, In-person Offerings  Patient Activation Measure Survey Score:  EMY Score (Last Two) 5/10/2018   EMY Raw Score 35   Activation Score 72.1   EMY Level 3       Goals Addressed as of 3/13/2019 at 3:38 PM                 Today    2/13/19      Reducing Risks (pt-stated)   On track  0%    Added 12/5/18 by Mahin Penny     My Goal: I will continue thinking about tobacco cessation    What I need to meet my goal: nothing needed    I plan to meet my goal by this date: 2/13/2019            Patient's most recent   Lab Results   Component Value Date    A1C 9.0 02/14/2019    is not meeting goal of <7.0    ASSESSMENT/INTERVENTION:   Significantly improvement in blood sugar control with increase to 1mg dose and diet changes.  Patient is not hungry; is limiting portions, decreased snacking & sugar sweetened beverages.  Next goals are to increase activity and lessen insulin.      Diabetes knowledge and skills assessment:   Patient is knowledgeable in diabetes management concepts related to: Healthy Eating, Being Active, Monitoring, Taking Medication, Problem Solving, Reducing Risks and Healthy Coping  Patient needs further education on the following diabetes management concepts:  Healthy Eating, Being Active, Taking Medication, Reducing Risks and Healthy Coping  Based on learning assessment above, most appropriate setting for further diabetes education would be: Individual setting.    Education provided today on:  AADE Self-Care Behaviors:  Healthy Eating: consistency in amount, composition, and timing of food intake and portion control  Being Active: relationship to blood glucose and precautions to take  Monitoring: frequency of monitoring  Taking Medication: action of prescribed medication  Problem Solving: low blood glucose - causes, signs/symptoms, treatment and prevention and carrying a carbohydrate source at all times    Opportunities for ongoing education and support in diabetes-self management were discussed.  Pt verbalized understanding of concepts discussed and recommendations provided today.       Education Materials Provided:  No new materials provided today    PLAN:  See Patient Instructions for co-developed, patient-stated behavior change goals.  AVS printed and provided to patient today. See Follow-Up section for recommended follow-up.    Dolly Riley RD, LD, CDE  Diabetes Education    Time Spent: 30 minutes  Encounter Type: Individual     A copy of this encounter was shared with the provider.

## 2019-03-13 NOTE — PATIENT INSTRUCTIONS
Keep up the great work!     New A1c 5/14/19 of after     If you get more readings in the 70s - ok to decrease insulin from 24 units to 21 units.  Call and update if having low blood sugars or 70s.         Lab Results   Component Value Date    A1C 9.0 02/14/2019    A1C 9.0 11/30/2018    A1C 8.7 11/26/2018    A1C 7.2 06/21/2018    A1C 7.3 04/11/2018     Dolly Riley RD, LD, CDE  For Diabetes Education appointments call: [phone]   For Diabetes Education Related Questions call: [phone] or email: [email or web address]

## 2019-03-21 ENCOUNTER — DOCUMENTATION ONLY (OUTPATIENT)
Dept: NUTRITION | Facility: CLINIC | Age: 34
End: 2019-03-21

## 2019-03-21 ENCOUNTER — DOCUMENTATION ONLY (OUTPATIENT)
Dept: NEPHROLOGY | Facility: CLINIC | Age: 34
End: 2019-03-21

## 2019-03-21 DIAGNOSIS — Z79.4 TYPE 2 DIABETES MELLITUS WITH STAGE 3 CHRONIC KIDNEY DISEASE, WITH LONG-TERM CURRENT USE OF INSULIN (H): Primary | ICD-10-CM

## 2019-03-21 DIAGNOSIS — E11.22 TYPE 2 DIABETES MELLITUS WITH STAGE 3 CHRONIC KIDNEY DISEASE, WITH LONG-TERM CURRENT USE OF INSULIN (H): Primary | ICD-10-CM

## 2019-03-21 DIAGNOSIS — N18.30 TYPE 2 DIABETES MELLITUS WITH STAGE 3 CHRONIC KIDNEY DISEASE, WITH LONG-TERM CURRENT USE OF INSULIN (H): Primary | ICD-10-CM

## 2019-03-21 DIAGNOSIS — N18.30 CKD (CHRONIC KIDNEY DISEASE) STAGE 3, GFR 30-59 ML/MIN (H): Primary | ICD-10-CM

## 2019-03-21 NOTE — PROGRESS NOTES
This patient has a lab only appointment tomorrow, March 22,2019. Please future an order for an a1C to be done. Thank you.

## 2019-03-21 NOTE — PROGRESS NOTES
This patient has a lab only appointment for tomorrow, March 22,2019. There are no orders in place. Please place any orders she may need. Thank you.

## 2019-03-22 ENCOUNTER — APPOINTMENT (OUTPATIENT)
Dept: ULTRASOUND IMAGING | Facility: CLINIC | Age: 34
DRG: 684 | End: 2019-03-22
Attending: PHYSICIAN ASSISTANT
Payer: MEDICARE

## 2019-03-22 ENCOUNTER — APPOINTMENT (OUTPATIENT)
Dept: GENERAL RADIOLOGY | Facility: CLINIC | Age: 34
DRG: 684 | End: 2019-03-22
Attending: PHYSICIAN ASSISTANT
Payer: MEDICARE

## 2019-03-22 ENCOUNTER — ALLIED HEALTH/NURSE VISIT (OUTPATIENT)
Dept: FAMILY MEDICINE | Facility: CLINIC | Age: 34
End: 2019-03-22
Payer: MEDICARE

## 2019-03-22 ENCOUNTER — HOSPITAL ENCOUNTER (INPATIENT)
Facility: CLINIC | Age: 34
LOS: 1 days | Discharge: HOME | DRG: 684 | End: 2019-03-24
Attending: FAMILY MEDICINE | Admitting: FAMILY MEDICINE
Payer: MEDICARE

## 2019-03-22 VITALS
HEART RATE: 98 BPM | DIASTOLIC BLOOD PRESSURE: 64 MMHG | OXYGEN SATURATION: 97 % | RESPIRATION RATE: 14 BRPM | SYSTOLIC BLOOD PRESSURE: 117 MMHG

## 2019-03-22 DIAGNOSIS — Z79.4 TYPE 2 DIABETES MELLITUS WITH STAGE 3 CHRONIC KIDNEY DISEASE, WITH LONG-TERM CURRENT USE OF INSULIN (H): Primary | ICD-10-CM

## 2019-03-22 DIAGNOSIS — E11.22 TYPE 2 DIABETES MELLITUS WITH STAGE 3 CHRONIC KIDNEY DISEASE, WITH LONG-TERM CURRENT USE OF INSULIN (H): ICD-10-CM

## 2019-03-22 DIAGNOSIS — I10 ESSENTIAL HYPERTENSION: ICD-10-CM

## 2019-03-22 DIAGNOSIS — N18.30 CKD (CHRONIC KIDNEY DISEASE) STAGE 3, GFR 30-59 ML/MIN (H): ICD-10-CM

## 2019-03-22 DIAGNOSIS — E11.22 TYPE 2 DIABETES MELLITUS WITH STAGE 3 CHRONIC KIDNEY DISEASE, WITH LONG-TERM CURRENT USE OF INSULIN (H): Primary | ICD-10-CM

## 2019-03-22 DIAGNOSIS — N18.30 TYPE 2 DIABETES MELLITUS WITH STAGE 3 CHRONIC KIDNEY DISEASE, WITH LONG-TERM CURRENT USE OF INSULIN (H): Primary | ICD-10-CM

## 2019-03-22 DIAGNOSIS — F31.9 BIPOLAR AFFECTIVE DISORDER, REMISSION STATUS UNSPECIFIED (H): ICD-10-CM

## 2019-03-22 DIAGNOSIS — Z79.4 TYPE 2 DIABETES MELLITUS WITH STAGE 3 CHRONIC KIDNEY DISEASE, WITH LONG-TERM CURRENT USE OF INSULIN (H): ICD-10-CM

## 2019-03-22 DIAGNOSIS — N18.30 TYPE 2 DIABETES MELLITUS WITH STAGE 3 CHRONIC KIDNEY DISEASE, WITH LONG-TERM CURRENT USE OF INSULIN (H): ICD-10-CM

## 2019-03-22 DIAGNOSIS — R55 NEAR SYNCOPE: ICD-10-CM

## 2019-03-22 DIAGNOSIS — N17.9 ACUTE RENAL FAILURE SUPERIMPOSED ON STAGE 3 CHRONIC KIDNEY DISEASE, UNSPECIFIED ACUTE RENAL FAILURE TYPE: ICD-10-CM

## 2019-03-22 DIAGNOSIS — N18.3 ACUTE RENAL FAILURE SUPERIMPOSED ON STAGE 3 CHRONIC KIDNEY DISEASE, UNSPECIFIED ACUTE RENAL FAILURE TYPE: ICD-10-CM

## 2019-03-22 PROBLEM — M25.562 ACUTE PAIN OF LEFT KNEE: Status: ACTIVE | Noted: 2019-03-22

## 2019-03-22 PROBLEM — D72.829 LEUKOCYTOSIS: Status: ACTIVE | Noted: 2019-03-22

## 2019-03-22 PROBLEM — Z72.0 NICOTINE ABUSE: Status: ACTIVE | Noted: 2018-08-13

## 2019-03-22 PROBLEM — N18.9 ACUTE-ON-CHRONIC KIDNEY INJURY (H): Status: ACTIVE | Noted: 2019-03-22

## 2019-03-22 PROBLEM — Z90.81 ACQUIRED ASPLENIA: Status: ACTIVE | Noted: 2019-03-22

## 2019-03-22 LAB
ALBUMIN SERPL-MCNC: 3.5 G/DL (ref 3.4–5)
ALBUMIN SERPL-MCNC: 3.8 G/DL (ref 3.4–5)
ALBUMIN UR-MCNC: NEGATIVE MG/DL
ALP SERPL-CCNC: 161 U/L (ref 40–150)
ALT SERPL W P-5'-P-CCNC: 45 U/L (ref 0–50)
ANION GAP SERPL CALCULATED.3IONS-SCNC: 6 MMOL/L (ref 3–14)
ANION GAP SERPL CALCULATED.3IONS-SCNC: 7 MMOL/L (ref 3–14)
APPEARANCE UR: ABNORMAL
AST SERPL W P-5'-P-CCNC: 23 U/L (ref 0–45)
BACTERIA #/AREA URNS HPF: ABNORMAL /HPF
BASOPHILS # BLD AUTO: 0.1 10E9/L (ref 0–0.2)
BASOPHILS # BLD AUTO: 0.1 10E9/L (ref 0–0.2)
BASOPHILS NFR BLD AUTO: 0.6 %
BASOPHILS NFR BLD AUTO: 0.6 %
BILIRUB SERPL-MCNC: 0.3 MG/DL (ref 0.2–1.3)
BILIRUB UR QL STRIP: NEGATIVE
BUN SERPL-MCNC: 43 MG/DL (ref 7–30)
BUN SERPL-MCNC: 43 MG/DL (ref 7–30)
CALCIUM SERPL-MCNC: 8.8 MG/DL (ref 8.5–10.1)
CALCIUM SERPL-MCNC: 8.9 MG/DL (ref 8.5–10.1)
CHLORIDE SERPL-SCNC: 109 MMOL/L (ref 94–109)
CHLORIDE SERPL-SCNC: 110 MMOL/L (ref 94–109)
CK SERPL-CCNC: 144 U/L (ref 30–225)
CO2 SERPL-SCNC: 23 MMOL/L (ref 20–32)
CO2 SERPL-SCNC: 25 MMOL/L (ref 20–32)
COLOR UR AUTO: YELLOW
CREAT SERPL-MCNC: 3.21 MG/DL (ref 0.52–1.04)
CREAT SERPL-MCNC: 3.47 MG/DL (ref 0.52–1.04)
D DIMER PPP FEU-MCNC: 0.5 UG/ML FEU (ref 0–0.5)
DIFFERENTIAL METHOD BLD: ABNORMAL
DIFFERENTIAL METHOD BLD: ABNORMAL
EOSINOPHIL # BLD AUTO: 0.2 10E9/L (ref 0–0.7)
EOSINOPHIL # BLD AUTO: 0.2 10E9/L (ref 0–0.7)
EOSINOPHIL NFR BLD AUTO: 1.1 %
EOSINOPHIL NFR BLD AUTO: 1.3 %
ERYTHROCYTE [DISTWIDTH] IN BLOOD BY AUTOMATED COUNT: 13.9 % (ref 10–15)
ERYTHROCYTE [DISTWIDTH] IN BLOOD BY AUTOMATED COUNT: 14.1 % (ref 10–15)
GFR SERPL CREATININE-BSD FRML MDRD: 16 ML/MIN/{1.73_M2}
GFR SERPL CREATININE-BSD FRML MDRD: 18 ML/MIN/{1.73_M2}
GLUCOSE BLDC GLUCOMTR-MCNC: 178 MG/DL (ref 70–99)
GLUCOSE BLDC GLUCOMTR-MCNC: 86 MG/DL (ref 70–99)
GLUCOSE SERPL-MCNC: 112 MG/DL (ref 70–99)
GLUCOSE SERPL-MCNC: 227 MG/DL (ref 70–99)
GLUCOSE UR STRIP-MCNC: NEGATIVE MG/DL
HBA1C MFR BLD: 8.2 % (ref 0–5.6)
HCG SERPL QL: NEGATIVE
HCT VFR BLD AUTO: 38.9 % (ref 35–47)
HCT VFR BLD AUTO: 41.1 % (ref 35–47)
HGB BLD-MCNC: 12 G/DL (ref 11.7–15.7)
HGB BLD-MCNC: 12.7 G/DL (ref 11.7–15.7)
HGB UR QL STRIP: ABNORMAL
HYALINE CASTS #/AREA URNS LPF: 24 /LPF (ref 0–2)
IMM GRANULOCYTES # BLD: 0.1 10E9/L (ref 0–0.4)
IMM GRANULOCYTES # BLD: 0.2 10E9/L (ref 0–0.4)
IMM GRANULOCYTES NFR BLD: 0.7 %
IMM GRANULOCYTES NFR BLD: 1 %
KETONES UR STRIP-MCNC: NEGATIVE MG/DL
LEUKOCYTE ESTERASE UR QL STRIP: ABNORMAL
LIPASE SERPL-CCNC: 376 U/L (ref 73–393)
LYMPHOCYTES # BLD AUTO: 4.9 10E9/L (ref 0.8–5.3)
LYMPHOCYTES # BLD AUTO: 6.4 10E9/L (ref 0.8–5.3)
LYMPHOCYTES NFR BLD AUTO: 27.1 %
LYMPHOCYTES NFR BLD AUTO: 34.8 %
MCH RBC QN AUTO: 30.2 PG (ref 26.5–33)
MCH RBC QN AUTO: 30.4 PG (ref 26.5–33)
MCHC RBC AUTO-ENTMCNC: 30.8 G/DL (ref 31.5–36.5)
MCHC RBC AUTO-ENTMCNC: 30.9 G/DL (ref 31.5–36.5)
MCV RBC AUTO: 98 FL (ref 78–100)
MCV RBC AUTO: 99 FL (ref 78–100)
MONOCYTES # BLD AUTO: 1.3 10E9/L (ref 0–1.3)
MONOCYTES # BLD AUTO: 1.6 10E9/L (ref 0–1.3)
MONOCYTES NFR BLD AUTO: 6.9 %
MONOCYTES NFR BLD AUTO: 8.5 %
NEUTROPHILS # BLD AUTO: 10 10E9/L (ref 1.6–8.3)
NEUTROPHILS # BLD AUTO: 11.5 10E9/L (ref 1.6–8.3)
NEUTROPHILS NFR BLD AUTO: 54.1 %
NEUTROPHILS NFR BLD AUTO: 63.3 %
NITRATE UR QL: NEGATIVE
NRBC # BLD AUTO: 0 10*3/UL
NRBC # BLD AUTO: 0 10*3/UL
NRBC BLD AUTO-RTO: 0 /100
NRBC BLD AUTO-RTO: 0 /100
PH UR STRIP: 5 PH (ref 5–7)
PHOSPHATE SERPL-MCNC: 3.9 MG/DL (ref 2.5–4.5)
PLATELET # BLD AUTO: 386 10E9/L (ref 150–450)
PLATELET # BLD AUTO: 416 10E9/L (ref 150–450)
PLATELET # BLD EST: ABNORMAL 10*3/UL
POTASSIUM SERPL-SCNC: 4.8 MMOL/L (ref 3.4–5.3)
POTASSIUM SERPL-SCNC: 5.2 MMOL/L (ref 3.4–5.3)
PROT SERPL-MCNC: 8.3 G/DL (ref 6.8–8.8)
PROT UR-MCNC: 0.39 G/L
PROT/CREAT 24H UR: 0.24 G/G CR (ref 0–0.2)
RBC # BLD AUTO: 3.95 10E12/L (ref 3.8–5.2)
RBC # BLD AUTO: 4.21 10E12/L (ref 3.8–5.2)
RBC #/AREA URNS AUTO: 4 /HPF (ref 0–2)
RBC MORPH BLD: NORMAL
SODIUM SERPL-SCNC: 140 MMOL/L (ref 133–144)
SODIUM SERPL-SCNC: 140 MMOL/L (ref 133–144)
SOURCE: ABNORMAL
SP GR UR STRIP: 1.01 (ref 1–1.03)
SQUAMOUS #/AREA URNS AUTO: 10 /HPF (ref 0–1)
TOXIC GRANULES BLD QL SMEAR: PRESENT
UROBILINOGEN UR STRIP-MCNC: 0 MG/DL (ref 0–2)
VARIANT LYMPHS BLD QL SMEAR: PRESENT
WBC # BLD AUTO: 18.2 10E9/L (ref 4–11)
WBC # BLD AUTO: 18.5 10E9/L (ref 4–11)
WBC #/AREA URNS AUTO: 10 /HPF (ref 0–5)

## 2019-03-22 PROCEDURE — 99285 EMERGENCY DEPT VISIT HI MDM: CPT | Mod: 25

## 2019-03-22 PROCEDURE — 82550 ASSAY OF CK (CPK): CPT | Performed by: FAMILY MEDICINE

## 2019-03-22 PROCEDURE — 83036 HEMOGLOBIN GLYCOSYLATED A1C: CPT | Performed by: NURSE PRACTITIONER

## 2019-03-22 PROCEDURE — 93010 ELECTROCARDIOGRAM REPORT: CPT | Mod: Z6 | Performed by: FAMILY MEDICINE

## 2019-03-22 PROCEDURE — 36415 COLL VENOUS BLD VENIPUNCTURE: CPT | Performed by: NURSE PRACTITIONER

## 2019-03-22 PROCEDURE — 99285 EMERGENCY DEPT VISIT HI MDM: CPT | Mod: 25 | Performed by: FAMILY MEDICINE

## 2019-03-22 PROCEDURE — 25000132 ZZH RX MED GY IP 250 OP 250 PS 637: Mod: GY | Performed by: FAMILY MEDICINE

## 2019-03-22 PROCEDURE — 81001 URINALYSIS AUTO W/SCOPE: CPT | Performed by: FAMILY MEDICINE

## 2019-03-22 PROCEDURE — 71046 X-RAY EXAM CHEST 2 VIEWS: CPT

## 2019-03-22 PROCEDURE — G0378 HOSPITAL OBSERVATION PER HR: HCPCS

## 2019-03-22 PROCEDURE — 85025 COMPLETE CBC W/AUTO DIFF WBC: CPT | Performed by: FAMILY MEDICINE

## 2019-03-22 PROCEDURE — 25000132 ZZH RX MED GY IP 250 OP 250 PS 637: Mod: GY | Performed by: PHYSICIAN ASSISTANT

## 2019-03-22 PROCEDURE — 99219 ZZC INITIAL OBSERVATION CARE,LEVL II: CPT | Performed by: PHYSICIAN ASSISTANT

## 2019-03-22 PROCEDURE — 76770 US EXAM ABDO BACK WALL COMP: CPT

## 2019-03-22 PROCEDURE — 25800030 ZZH RX IP 258 OP 636: Performed by: FAMILY MEDICINE

## 2019-03-22 PROCEDURE — 84703 CHORIONIC GONADOTROPIN ASSAY: CPT | Performed by: FAMILY MEDICINE

## 2019-03-22 PROCEDURE — 00000146 ZZHCL STATISTIC GLUCOSE BY METER IP

## 2019-03-22 PROCEDURE — 96360 HYDRATION IV INFUSION INIT: CPT

## 2019-03-22 PROCEDURE — 85379 FIBRIN DEGRADATION QUANT: CPT | Performed by: FAMILY MEDICINE

## 2019-03-22 PROCEDURE — 25000128 H RX IP 250 OP 636: Performed by: FAMILY MEDICINE

## 2019-03-22 PROCEDURE — 85025 COMPLETE CBC W/AUTO DIFF WBC: CPT | Performed by: CLINICAL NURSE SPECIALIST

## 2019-03-22 PROCEDURE — 96361 HYDRATE IV INFUSION ADD-ON: CPT

## 2019-03-22 PROCEDURE — 83690 ASSAY OF LIPASE: CPT | Performed by: FAMILY MEDICINE

## 2019-03-22 PROCEDURE — 99207 ZZC NO CHARGE NURSE ONLY: CPT

## 2019-03-22 PROCEDURE — 83970 ASSAY OF PARATHORMONE: CPT | Performed by: CLINICAL NURSE SPECIALIST

## 2019-03-22 PROCEDURE — 93971 EXTREMITY STUDY: CPT | Mod: LT

## 2019-03-22 PROCEDURE — A9270 NON-COVERED ITEM OR SERVICE: HCPCS | Mod: GY | Performed by: FAMILY MEDICINE

## 2019-03-22 PROCEDURE — 93005 ELECTROCARDIOGRAM TRACING: CPT

## 2019-03-22 PROCEDURE — 99207 ZZC CDG-CODE CATEGORY CHANGED: CPT | Performed by: PHYSICIAN ASSISTANT

## 2019-03-22 PROCEDURE — 84156 ASSAY OF PROTEIN URINE: CPT | Performed by: FAMILY MEDICINE

## 2019-03-22 PROCEDURE — 80053 COMPREHEN METABOLIC PANEL: CPT | Performed by: FAMILY MEDICINE

## 2019-03-22 PROCEDURE — A9270 NON-COVERED ITEM OR SERVICE: HCPCS | Mod: GY | Performed by: PHYSICIAN ASSISTANT

## 2019-03-22 PROCEDURE — 80069 RENAL FUNCTION PANEL: CPT | Performed by: CLINICAL NURSE SPECIALIST

## 2019-03-22 RX ORDER — LORATADINE 10 MG/1
10 TABLET ORAL EVERY MORNING
Status: DISCONTINUED | OUTPATIENT
Start: 2019-03-23 | End: 2019-03-24 | Stop reason: HOSPADM

## 2019-03-22 RX ORDER — NICOTINE POLACRILEX 4 MG
15-30 LOZENGE BUCCAL
Status: DISCONTINUED | OUTPATIENT
Start: 2019-03-22 | End: 2019-03-24 | Stop reason: HOSPADM

## 2019-03-22 RX ORDER — NALOXONE HYDROCHLORIDE 0.4 MG/ML
.1-.4 INJECTION, SOLUTION INTRAMUSCULAR; INTRAVENOUS; SUBCUTANEOUS
Status: DISCONTINUED | OUTPATIENT
Start: 2019-03-22 | End: 2019-03-24 | Stop reason: HOSPADM

## 2019-03-22 RX ORDER — MONTELUKAST SODIUM 10 MG/1
10 TABLET ORAL AT BEDTIME
Status: DISCONTINUED | OUTPATIENT
Start: 2019-03-22 | End: 2019-03-24 | Stop reason: HOSPADM

## 2019-03-22 RX ORDER — ONDANSETRON 2 MG/ML
4 INJECTION INTRAMUSCULAR; INTRAVENOUS EVERY 6 HOURS PRN
Status: DISCONTINUED | OUTPATIENT
Start: 2019-03-22 | End: 2019-03-24 | Stop reason: HOSPADM

## 2019-03-22 RX ORDER — LANOLIN ALCOHOL/MO/W.PET/CERES
100 CREAM (GRAM) TOPICAL ONCE
Status: COMPLETED | OUTPATIENT
Start: 2019-03-22 | End: 2019-03-22

## 2019-03-22 RX ORDER — AMLODIPINE BESYLATE 10 MG/1
10 TABLET ORAL DAILY
Status: DISCONTINUED | OUTPATIENT
Start: 2019-03-23 | End: 2019-03-24 | Stop reason: HOSPADM

## 2019-03-22 RX ORDER — SODIUM CHLORIDE 9 MG/ML
INJECTION, SOLUTION INTRAVENOUS CONTINUOUS
Status: DISCONTINUED | OUTPATIENT
Start: 2019-03-22 | End: 2019-03-23 | Stop reason: CLARIF

## 2019-03-22 RX ORDER — LAMOTRIGINE 100 MG/1
100 TABLET ORAL AT BEDTIME
Status: DISCONTINUED | OUTPATIENT
Start: 2019-03-22 | End: 2019-03-24 | Stop reason: HOSPADM

## 2019-03-22 RX ORDER — MULTIVITAMIN,THERAPEUTIC
1 TABLET ORAL ONCE
Status: COMPLETED | OUTPATIENT
Start: 2019-03-22 | End: 2019-03-22

## 2019-03-22 RX ORDER — ONDANSETRON 4 MG/1
4 TABLET, ORALLY DISINTEGRATING ORAL EVERY 6 HOURS PRN
Status: DISCONTINUED | OUTPATIENT
Start: 2019-03-22 | End: 2019-03-24 | Stop reason: HOSPADM

## 2019-03-22 RX ORDER — MAGNESIUM OXIDE 400 MG/1
800 TABLET ORAL ONCE
Status: COMPLETED | OUTPATIENT
Start: 2019-03-22 | End: 2019-03-22

## 2019-03-22 RX ORDER — FOLIC ACID 1 MG/1
1 TABLET ORAL ONCE
Status: COMPLETED | OUTPATIENT
Start: 2019-03-22 | End: 2019-03-22

## 2019-03-22 RX ORDER — DEXTROSE MONOHYDRATE 25 G/50ML
25-50 INJECTION, SOLUTION INTRAVENOUS
Status: DISCONTINUED | OUTPATIENT
Start: 2019-03-22 | End: 2019-03-24 | Stop reason: HOSPADM

## 2019-03-22 RX ORDER — ARIPIPRAZOLE 15 MG/1
30 TABLET ORAL DAILY
Status: DISCONTINUED | OUTPATIENT
Start: 2019-03-23 | End: 2019-03-24 | Stop reason: HOSPADM

## 2019-03-22 RX ORDER — SODIUM CHLORIDE 9 MG/ML
1000 INJECTION, SOLUTION INTRAVENOUS CONTINUOUS
Status: DISCONTINUED | OUTPATIENT
Start: 2019-03-22 | End: 2019-03-23

## 2019-03-22 RX ORDER — CARVEDILOL 6.25 MG/1
6.25 TABLET ORAL 2 TIMES DAILY WITH MEALS
Status: DISCONTINUED | OUTPATIENT
Start: 2019-03-23 | End: 2019-03-24 | Stop reason: HOSPADM

## 2019-03-22 RX ADMIN — SODIUM CHLORIDE 1000 ML: 9 INJECTION, SOLUTION INTRAVENOUS at 17:47

## 2019-03-22 RX ADMIN — Medication 100 MG: at 17:51

## 2019-03-22 RX ADMIN — LAMOTRIGINE 100 MG: 100 TABLET ORAL at 22:57

## 2019-03-22 RX ADMIN — MAGNESIUM OXIDE TAB 400 MG (241.3 MG ELEMENTAL MG) 800 MG: 400 (241.3 MG) TAB at 17:49

## 2019-03-22 RX ADMIN — MONTELUKAST 10 MG: 10 TABLET, FILM COATED ORAL at 22:57

## 2019-03-22 RX ADMIN — FOLIC ACID 1 MG: 1 TABLET ORAL at 17:50

## 2019-03-22 RX ADMIN — MULTIVITAMIN TABLET 1 TABLET: TABLET at 17:47

## 2019-03-22 RX ADMIN — SODIUM CHLORIDE 1000 ML: 9 INJECTION, SOLUTION INTRAVENOUS at 18:55

## 2019-03-22 RX ADMIN — SODIUM CHLORIDE: 9 INJECTION, SOLUTION INTRAVENOUS at 23:03

## 2019-03-22 ASSESSMENT — ENCOUNTER SYMPTOMS
CONSTIPATION: 0
CHILLS: 0
SINUS PRESSURE: 0
NAUSEA: 1
FREQUENCY: 0
WHEEZING: 0
DIZZINESS: 1
DYSURIA: 0
PALPITATIONS: 0
DIARRHEA: 1
SORE THROAT: 0
FEVER: 0
HEADACHES: 1
COUGH: 0
DIAPHORESIS: 0
ABDOMINAL PAIN: 0
SHORTNESS OF BREATH: 0
VOMITING: 0
BLOOD IN STOOL: 0

## 2019-03-22 ASSESSMENT — MIFFLIN-ST. JEOR: SCORE: 1549

## 2019-03-22 NOTE — ED PROVIDER NOTES
"  History     Chief Complaint   Patient presents with     Dizziness     dizziness started today. Denies cough or cold sx.      HPI  Divina Delgadillo is a 34 year old female with type II diabetes, bipolar.  started on lisinopril 5 mg at 3 weeks ago, and increased to 10 mg a few days ago.  onset 0830 this am, blurred vision, legs weak, dizziness - ?vertigo.  ?near syncope. and worse with standing. persisted throughout the day off and on.  mild headache frontal.  No fever.    no URI symptoms.   diarrhea 2 days ago all night, staying hydrated since then.  The patient denies dysuria, frequency or hematuria.    2014 history of pancreatic cancer?    early march flight to [place].  prior DVT.   No recent surgery (last 4 weeks), active cancer history, hypercoagulable state, estrogen or other medications/conditions causing VTE or  new unilateral swelling or pain in the legs or calves.        Allergies:  Allergies   Allergen Reactions     Acetaminophen Other (See Comments)     pt previously tried to overdose on it, PMD does not want pt taking per pt.      Latex      Latex Rash       Problem List:    Patient Active Problem List    Diagnosis Date Noted     H/O leukocytosis 11/27/2018     Priority: Medium     Due to spleen removal       Nicotine abuse 08/13/2018     Priority: Medium     Mild intermittent asthma with acute exacerbation 01/16/2018     Priority: Medium     Morbid obesity (H) 01/09/2018     Priority: Medium     IUD (intrauterine device) in place 07/26/2017     Priority: Medium     Mirena IUD inserted in office with premed 7/26/2017         Cervical high risk HPV (human papillomavirus) test positive 06/20/2017     Priority: Medium     6/15/2017 Pap:NIL, +HR HPV \"other\" (not 16/18). Plan to repeat Pap+HPV in 1 year  6/14/2018 Pap: NIL/neg HPV. Plan Pap+HPV in 3 years (2021)       DVT (deep venous thrombosis) (H) 02/27/2017     Priority: Medium     CKD (chronic kidney disease) stage 3, GFR 30-59 ml/min (H) 02/27/2017 "     Priority: Medium     Malignant neoplasm of pancreas, unspecified location of malignancy (H) 02/27/2017     Priority: Medium     Type 2 diabetes mellitus with stage 3 chronic kidney disease, with long-term current use of insulin (H) 02/27/2017     Priority: Medium     Bipolar disorder (H) 02/27/2017     Priority: Medium     HYPERLIPIDEMIA LDL GOAL <100 10/31/2010     Priority: Medium     Essential hypertension 06/13/2008     Priority: Medium     non alcoholic fatty liver disease 04/11/2006     Priority: Medium     See flowsheet for hepatic panel.   Stopped zocor, was on godon as well had right upper quandrant ultrasound and ct  ? Psych med related vs [facility]NI       Esophageal reflux 04/14/2005     Priority: Medium     GERD          Past Medical History:    Past Medical History:   Diagnosis Date     Cervical high risk HPV (human papillomavirus) test positive 6/20/2017     Depressive disorder, not elsewhere classified      DVT (deep venous thrombosis) (H)      Dysmetabolic syndrome X      Esophageal reflux      Mild intermittent asthma      Other abnormal heart sounds      Other specified types of schizophrenia, unspecified condition      Pancreatic cancer (H)      Type II or unspecified type diabetes mellitus without mention of complication, not stated as uncontrolled      Unspecified disorder of tympanic membrane        Past Surgical History:    Past Surgical History:   Procedure Laterality Date     ADRENALECTOMY       PANCREATECTOMY PARTIAL       SPLENECTOMY       SURGICAL HISTORY OF -   10/1/2000    Tympanoplasty     SURGICAL HISTORY OF -   10/1/2000    Tonsillectomy       Family History:    Family History   Problem Relation Age of Onset     Respiratory Mother         ASTHMA     Allergies Mother      Depression Mother      Heart Disease Father         HEART VALVE REPLACEMENT     Hypertension Father      Diabetes Father      Depression Father      Respiratory Brother      Allergies Brother      Respiratory Sister       Allergies Sister      Depression Sister      Respiratory Sister      Allergies Sister      Depression Sister      Kidney Disease No family hx of        Social History:  Marital Status:  Single [1]  Social History     Tobacco Use     Smoking status: Current Every Day Smoker     Packs/day: 0.50     Years: 4.00     Pack years: 2.00     Types: Cigarettes     Smokeless tobacco: Never Used     Tobacco comment: has information about quitplan   Substance Use Topics     Alcohol use: No     Drug use: No        Medications:      albuterol (PROAIR HFA/PROVENTIL HFA/VENTOLIN HFA) 108 (90 BASE) MCG/ACT Inhaler   amLODIPine (NORVASC) 5 MG tablet   ARIPiprazole (ABILIFY) 30 MG tablet   blood glucose monitoring (NO BRAND SPECIFIED) test strip   blood glucose monitoring (SOFTCLIX) lancets   Blood Glucose Monitoring Suppl (ACCU-CHEK GUIDE) w/Device KIT   carvedilol (COREG) 6.25 MG tablet   Clozapine (CLOZARIL) 200 MG tablet   FLUoxetine (PROZAC) 20 MG capsule   insulin glargine (LANTUS SOLOSTAR PEN) 100 UNIT/ML pen   lamoTRIgine (LAMICTAL) 25 MG tablet   lisinopril (PRINIVIL/ZESTRIL) 10 MG tablet   loratadine (CLARITIN) 10 MG tablet   montelukast (SINGULAIR) 10 MG tablet   omeprazole (PRILOSEC) 20 MG CR capsule   ranitidine (ZANTAC) 300 MG tablet   semaglutide (OZEMPIC) 1 MG/DOSE pen   Sodium Fluoride (PREVIDENT 5000 PLUS DT)   ULTICARE MINI 31G X 6 MM insulin pen needle   ULTILET SHARPS CONTAINER 1QT MISC         Review of Systems   Constitutional: Negative for chills, diaphoresis and fever.   HENT: Negative for ear pain, sinus pressure and sore throat.    Eyes: Negative for visual disturbance.   Respiratory: Negative for cough, shortness of breath and wheezing.    Cardiovascular: Negative for chest pain and palpitations.   Gastrointestinal: Positive for diarrhea and nausea. Negative for abdominal pain, blood in stool, constipation and vomiting.   Genitourinary: Negative for dysuria, frequency and urgency.   Skin: Negative for  rash.   Neurological: Positive for dizziness, syncope and headaches.   All other systems reviewed and are negative.      Physical Exam   BP: 97/62  Pulse: 99  Temp: 97.9  F (36.6  C)  Resp: 14  Weight: 88.5 kg (195 lb)  SpO2: 99 %  Lying Orthostatic BP: 117/53  Lying Orthostatic Pulse: 100 bpm  Sitting Orthostatic BP: 120/61  Sitting Orthostatic Pulse: 100 bpm  Standing Orthostatic BP: 115/62  Standing Orthostatic Pulse: 104 bpm      Physical Exam   Constitutional: No distress.   HENT:   Head: Normocephalic and atraumatic.   Mouth/Throat: Oropharynx is clear and moist.   Eyes: EOM are normal. Pupils are equal, round, and reactive to light.   Neck: Neck supple. No thyromegaly present.   Cardiovascular: Normal rate and regular rhythm. Exam reveals no friction rub.   No murmur heard.  Pulmonary/Chest: Effort normal and breath sounds normal. No stridor. No respiratory distress. She has no wheezes.   Abdominal: Soft. Bowel sounds are normal. She exhibits no distension and no mass. There is tenderness. There is no guarding.   Musculoskeletal: She exhibits no edema.   Neurological: She is alert. She displays normal reflexes. No cranial nerve deficit. She exhibits normal muscle tone. Coordination normal.   Skin: No rash noted. No pallor.       ED Course        Procedures           Critical Care time:  none            EKG Interpretation:      Interpreted by Myles Gonzalez MD    EKG done at 1808 hrs. demonstrates a sinus rhythm at 92 bpm with a normal axis and no ST change.  No T wave changes.  Normal R progression and no Q waves.  Normal intervals.  Normal conduction.  No ectopy.  Impression sinus rhythm 92 bpm and no significant change from EKG done on May 17, 2017.            Results for orders placed or performed during the hospital encounter of 03/22/19 (from the past 24 hour(s))   CBC with platelets differential   Result Value Ref Range    WBC 18.5 (H) 4.0 - 11.0 10e9/L    RBC Count 4.21 3.8 - 5.2 10e12/L    Hemoglobin  12.7 11.7 - 15.7 g/dL    Hematocrit 41.1 35.0 - 47.0 %    MCV 98 78 - 100 fl    MCH 30.2 26.5 - 33.0 pg    MCHC 30.9 (L) 31.5 - 36.5 g/dL    RDW 14.1 10.0 - 15.0 %    Platelet Count 416 150 - 450 10e9/L    Diff Method Automated Method     % Neutrophils 54.1 %    % Lymphocytes 34.8 %    % Monocytes 8.5 %    % Eosinophils 1.3 %    % Basophils 0.6 %    % Immature Granulocytes 0.7 %    Nucleated RBCs 0 0 /100    Absolute Neutrophil 10.0 (H) 1.6 - 8.3 10e9/L    Absolute Lymphocytes 6.4 (H) 0.8 - 5.3 10e9/L    Absolute Monocytes 1.6 (H) 0.0 - 1.3 10e9/L    Absolute Eosinophils 0.2 0.0 - 0.7 10e9/L    Absolute Basophils 0.1 0.0 - 0.2 10e9/L    Abs Immature Granulocytes 0.1 0 - 0.4 10e9/L    Absolute Nucleated RBC 0.0     Reactive Lymphs Present     Toxic Granulation Present     RBC Morphology Normal     Platelet Estimate       Automated count confirmed.  Platelet morphology is normal.   Comprehensive metabolic panel   Result Value Ref Range    Sodium 140 133 - 144 mmol/L    Potassium 4.8 3.4 - 5.3 mmol/L    Chloride 109 94 - 109 mmol/L    Carbon Dioxide 25 20 - 32 mmol/L    Anion Gap 6 3 - 14 mmol/L    Glucose 112 (H) 70 - 99 mg/dL    Urea Nitrogen 43 (H) 7 - 30 mg/dL    Creatinine 3.21 (H) 0.52 - 1.04 mg/dL    GFR Estimate 18 (L) >60 mL/min/[1.73_m2]    GFR Estimate If Black 21 (L) >60 mL/min/[1.73_m2]    Calcium 8.9 8.5 - 10.1 mg/dL    Bilirubin Total 0.3 0.2 - 1.3 mg/dL    Albumin 3.8 3.4 - 5.0 g/dL    Protein Total 8.3 6.8 - 8.8 g/dL    Alkaline Phosphatase 161 (H) 40 - 150 U/L    ALT 45 0 - 50 U/L    AST 23 0 - 45 U/L   Lipase   Result Value Ref Range    Lipase 376 73 - 393 U/L   D dimer quantitative   Result Value Ref Range    D Dimer 0.5 0.0 - 0.50 ug/ml FEU   CK total   Result Value Ref Range    CK Total 144 30 - 225 U/L   Glucose by meter   Result Value Ref Range    Glucose 86 70 - 99 mg/dL   UA with Microscopic   Result Value Ref Range    Color Urine Yellow     Appearance Urine Cloudy     Glucose Urine Negative  NEG^Negative mg/dL    Bilirubin Urine Negative NEG^Negative    Ketones Urine Negative NEG^Negative mg/dL    Specific Gravity Urine 1.013 1.003 - 1.035    Blood Urine Small (A) NEG^Negative    pH Urine 5.0 5.0 - 7.0 pH    Protein Albumin Urine Negative NEG^Negative mg/dL    Urobilinogen mg/dL 0.0 0.0 - 2.0 mg/dL    Nitrite Urine Negative NEG^Negative    Leukocyte Esterase Urine Small (A) NEG^Negative    Source Midstream Urine     WBC Urine 10 (H) 0 - 5 /HPF    RBC Urine 4 (H) 0 - 2 /HPF    Bacteria Urine Few (A) NEG^Negative /HPF    Squamous Epithelial /HPF Urine 10 (H) 0 - 1 /HPF    Hyaline Casts 24 (H) 0 - 2 /LPF   Protein  random urine with Creat Ratio   Result Value Ref Range    Protein Random Urine 0.39 g/L    Protein Total Urine g/gr Creatinine 0.24 (H) 0 - 0.2 g/g Cr   HCG qualitative Blood   Result Value Ref Range    HCG Qualitative Serum Negative NEG^Negative   XR Chest 2 Views    Narrative    CHEST TWO VIEWS  3/22/2019 9:16 PM     HISTORY: Syncope    COMPARISON: 12/8/2019      Impression    IMPRESSION: No acute abnormality. Lungs are well-inflated and clear.  Heart size is normal. Epigastric clips noted.    CLARI CEDILLO MD   Glucose by meter   Result Value Ref Range    Glucose 178 (H) 70 - 99 mg/dL       Medications - No data to display    Assessments & Plan (with Medical Decision Making)     MDM: Divina Delgadillo is a 34 year old female who presented with a complicated history of type 2 diabetes, prior DVT, pancreatic cancer status post resection in 2015, alcohol abuse, nonalcoholic fatty liver disease, bipolar disorder with a sense of near syncopal symptoms earlier today without associated presyncopal symptoms and without significant associated systemic symptoms, no signs of infection, cardiopulmonary causes, toxic or metabolic causes.    On presentation she has reassuring vital signs without significant orthostasis.  She has a prior DVT and also had recent travel to [place] by plane and therefore  d-dimer was done but was normal.  EKG demonstrates no acute changes.  Her laboratory testing is reassuring with the exception of a couple of findings.  Her long-standing history of chronic kidney disease being followed by Dr. Dias in renal, is associated with an acute on chronic kidney injury despite maintaining good fluid intake recently, not on diuretics, denying use of NSAIDs.  She was recently increased from 5 to now 10 mg lisinopril but on discussion with nephrology on call, Dr. Sandoval, this is unlikely to be the cause of her current findings-unlikely to be renal artery stenosis.  He did however recommend that the patient had her lisinopril held, and continue hydration overnight with recheck labs in the a.m.  I discussed with Lasha on hospitalist service and accepts for observation.  Discussed with the patient and she agrees to stay.    Of note is that her white blood cell count is also elevated but has been so for some time is been just gradually increasing.  We discussed that following up with hematology may be appropriate.  CLL could cause this.    I have reviewed the nursing notes.    I have reviewed the findings, diagnosis, plan and need for follow up with the patient.       ED to Inpatient Handoff:    Discussed with Lasha   Patient accepted for Observation Stay  Pending studies include none  Code Status: Full Code              Medication List      There are no discharge medications for this visit.         Final diagnoses:   Acute renal failure superimposed on stage 3 chronic kidney disease, unspecified acute renal failure type (H)   Near syncope   Type 2 diabetes mellitus with stage 3 chronic kidney disease, with long-term current use of insulin (H)   Bipolar affective disorder, remission status unspecified (H)       3/22/2019   [facility] EMERGENCY DEPARTMENT     Myles Gonzalez MD  03/22/19 [id number]

## 2019-03-22 NOTE — ED NOTES
"Pt presents to ED with complaints of dizziness.  States started today when she was at work.  Pt feels dizzy with movement.  NO cough or cold symptoms.  Pt had HTN med change \"a few weeks ago\" has been tolerating it well.  Denies HA or blurry vision at rest.    "

## 2019-03-22 NOTE — PROGRESS NOTES
Every time she stands up she feels light headed and feels that she is going to pass out. Patient states this just started today. Patient denies any chest pain and chest tightness. Patient states she has weakness in her legs.  Patient denies any SOA.  Patient denies any sweating or calmy. Patient states when she stands she feels that her legs are going to give out.  Patent did check her blood sugar this morning and it was 130's.  Patient reports she did eat lunch. Patient has complex history.  Patient has foster parents. Patient's foster mother was in the clinic. Foster mother notified of the symptoms she is having.  Writer discussed with providers and advised patient to be seen in UC/ER for evaluation.  Foster mother and patient understand the plan.    Oliva RODRIGUEZ RN

## 2019-03-23 PROBLEM — N17.9 ARF (ACUTE RENAL FAILURE) (H): Status: ACTIVE | Noted: 2019-03-23

## 2019-03-23 LAB
ANION GAP SERPL CALCULATED.3IONS-SCNC: 4 MMOL/L (ref 3–14)
BUN SERPL-MCNC: 34 MG/DL (ref 7–30)
CALCIUM SERPL-MCNC: 7.8 MG/DL (ref 8.5–10.1)
CHLORIDE SERPL-SCNC: 117 MMOL/L (ref 94–109)
CO2 SERPL-SCNC: 23 MMOL/L (ref 20–32)
CREAT SERPL-MCNC: 1.94 MG/DL (ref 0.52–1.04)
ERYTHROCYTE [DISTWIDTH] IN BLOOD BY AUTOMATED COUNT: 14 % (ref 10–15)
GFR SERPL CREATININE-BSD FRML MDRD: 33 ML/MIN/{1.73_M2}
GLUCOSE BLDC GLUCOMTR-MCNC: 144 MG/DL (ref 70–99)
GLUCOSE BLDC GLUCOMTR-MCNC: 147 MG/DL (ref 70–99)
GLUCOSE BLDC GLUCOMTR-MCNC: 148 MG/DL (ref 70–99)
GLUCOSE BLDC GLUCOMTR-MCNC: 160 MG/DL (ref 70–99)
GLUCOSE SERPL-MCNC: 161 MG/DL (ref 70–99)
HCT VFR BLD AUTO: 35 % (ref 35–47)
HGB BLD-MCNC: 10.5 G/DL (ref 11.7–15.7)
MCH RBC QN AUTO: 29.7 PG (ref 26.5–33)
MCHC RBC AUTO-ENTMCNC: 30 G/DL (ref 31.5–36.5)
MCV RBC AUTO: 99 FL (ref 78–100)
PLATELET # BLD AUTO: 347 10E9/L (ref 150–450)
POTASSIUM SERPL-SCNC: 5 MMOL/L (ref 3.4–5.3)
PROCALCITONIN SERPL-MCNC: <0.05 NG/ML
PTH-INTACT SERPL-MCNC: 150 PG/ML (ref 18–80)
RBC # BLD AUTO: 3.54 10E12/L (ref 3.8–5.2)
SODIUM SERPL-SCNC: 144 MMOL/L (ref 133–144)
WBC # BLD AUTO: 15.7 10E9/L (ref 4–11)

## 2019-03-23 PROCEDURE — 25800030 ZZH RX IP 258 OP 636: Performed by: FAMILY MEDICINE

## 2019-03-23 PROCEDURE — 00000146 ZZHCL STATISTIC GLUCOSE BY METER IP

## 2019-03-23 PROCEDURE — A9270 NON-COVERED ITEM OR SERVICE: HCPCS | Mod: GY | Performed by: PHYSICIAN ASSISTANT

## 2019-03-23 PROCEDURE — 85027 COMPLETE CBC AUTOMATED: CPT | Performed by: FAMILY MEDICINE

## 2019-03-23 PROCEDURE — 96361 HYDRATE IV INFUSION ADD-ON: CPT

## 2019-03-23 PROCEDURE — 96372 THER/PROPH/DIAG INJ SC/IM: CPT

## 2019-03-23 PROCEDURE — 25000131 ZZH RX MED GY IP 250 OP 636 PS 637: Mod: GY | Performed by: PHYSICIAN ASSISTANT

## 2019-03-23 PROCEDURE — 99233 SBSQ HOSP IP/OBS HIGH 50: CPT | Performed by: INTERNAL MEDICINE

## 2019-03-23 PROCEDURE — 84145 PROCALCITONIN (PCT): CPT | Performed by: PHYSICIAN ASSISTANT

## 2019-03-23 PROCEDURE — 12000000 ZZH R&B MED SURG/OB

## 2019-03-23 PROCEDURE — 25000132 ZZH RX MED GY IP 250 OP 250 PS 637: Mod: GY | Performed by: PHYSICIAN ASSISTANT

## 2019-03-23 PROCEDURE — 80048 BASIC METABOLIC PNL TOTAL CA: CPT | Performed by: FAMILY MEDICINE

## 2019-03-23 PROCEDURE — 25000132 ZZH RX MED GY IP 250 OP 250 PS 637: Mod: GY | Performed by: INTERNAL MEDICINE

## 2019-03-23 PROCEDURE — A9270 NON-COVERED ITEM OR SERVICE: HCPCS | Mod: GY | Performed by: INTERNAL MEDICINE

## 2019-03-23 PROCEDURE — 36415 COLL VENOUS BLD VENIPUNCTURE: CPT | Performed by: PHYSICIAN ASSISTANT

## 2019-03-23 PROCEDURE — 36415 COLL VENOUS BLD VENIPUNCTURE: CPT | Performed by: FAMILY MEDICINE

## 2019-03-23 PROCEDURE — G0378 HOSPITAL OBSERVATION PER HR: HCPCS

## 2019-03-23 RX ORDER — CLOZAPINE 100 MG/1
200 TABLET ORAL 2 TIMES DAILY
Status: DISCONTINUED | OUTPATIENT
Start: 2019-03-23 | End: 2019-03-24 | Stop reason: HOSPADM

## 2019-03-23 RX ORDER — IBUPROFEN 600 MG/1
600 TABLET, FILM COATED ORAL ONCE
Status: COMPLETED | OUTPATIENT
Start: 2019-03-23 | End: 2019-03-23

## 2019-03-23 RX ADMIN — CARVEDILOL 6.25 MG: 6.25 TABLET, FILM COATED ORAL at 17:55

## 2019-03-23 RX ADMIN — SODIUM CHLORIDE: 9 INJECTION, SOLUTION INTRAVENOUS at 06:48

## 2019-03-23 RX ADMIN — MONTELUKAST 10 MG: 10 TABLET, FILM COATED ORAL at 22:10

## 2019-03-23 RX ADMIN — INSULIN ASPART 1 UNITS: 100 INJECTION, SOLUTION INTRAVENOUS; SUBCUTANEOUS at 17:55

## 2019-03-23 RX ADMIN — INSULIN ASPART 1 UNITS: 100 INJECTION, SOLUTION INTRAVENOUS; SUBCUTANEOUS at 12:02

## 2019-03-23 RX ADMIN — CLOZAPINE 200 MG: 100 TABLET ORAL at 08:21

## 2019-03-23 RX ADMIN — CLOZAPINE 200 MG: 100 TABLET ORAL at 01:02

## 2019-03-23 RX ADMIN — CARVEDILOL 6.25 MG: 6.25 TABLET, FILM COATED ORAL at 08:21

## 2019-03-23 RX ADMIN — LAMOTRIGINE 100 MG: 100 TABLET ORAL at 22:10

## 2019-03-23 RX ADMIN — OMEPRAZOLE 20 MG: 20 CAPSULE, DELAYED RELEASE ORAL at 08:21

## 2019-03-23 RX ADMIN — AMLODIPINE BESYLATE 10 MG: 10 TABLET ORAL at 08:21

## 2019-03-23 RX ADMIN — CLOZAPINE 200 MG: 100 TABLET ORAL at 19:44

## 2019-03-23 RX ADMIN — LORATADINE 10 MG: 10 TABLET ORAL at 08:21

## 2019-03-23 RX ADMIN — FLUOXETINE 20 MG: 20 CAPSULE ORAL at 08:21

## 2019-03-23 RX ADMIN — IBUPROFEN 600 MG: 600 TABLET ORAL at 14:22

## 2019-03-23 RX ADMIN — SODIUM CHLORIDE: 9 INJECTION, SOLUTION INTRAVENOUS at 14:53

## 2019-03-23 RX ADMIN — INSULIN GLARGINE 24 UNITS: 100 INJECTION, SOLUTION SUBCUTANEOUS at 08:23

## 2019-03-23 RX ADMIN — ARIPIPRAZOLE 30 MG: 15 TABLET ORAL at 08:21

## 2019-03-23 RX ADMIN — INSULIN ASPART 1 UNITS: 100 INJECTION, SOLUTION INTRAVENOUS; SUBCUTANEOUS at 08:23

## 2019-03-23 ASSESSMENT — ACTIVITIES OF DAILY LIVING (ADL)
ADLS_ACUITY_SCORE: 11
AMBULATION: 0-->INDEPENDENT
ADLS_ACUITY_SCORE: 11
TRANSFERRING: 0-->INDEPENDENT
TOILETING: 0-->INDEPENDENT
ADLS_ACUITY_SCORE: 11
DRESS: 0-->INDEPENDENT
WHICH_OF_THE_ABOVE_FUNCTIONAL_RISKS_HAD_A_RECENT_ONSET_OR_CHANGE?: FALL HISTORY
BATHING: 0-->INDEPENDENT
NUMBER_OF_TIMES_PATIENT_HAS_FALLEN_WITHIN_LAST_SIX_MONTHS: 1
RETIRED_COMMUNICATION: 0-->UNDERSTANDS/COMMUNICATES WITHOUT DIFFICULTY
FALL_HISTORY_WITHIN_LAST_SIX_MONTHS: YES
SWALLOWING: 0-->SWALLOWS FOODS/LIQUIDS WITHOUT DIFFICULTY
COGNITION: 0 - NO COGNITION ISSUES REPORTED
RETIRED_EATING: 0-->INDEPENDENT

## 2019-03-23 ASSESSMENT — MIFFLIN-ST. JEOR: SCORE: 1576

## 2019-03-23 NOTE — PROGRESS NOTES
"[facility] [facility] ADMISSION NOTE    Patient admitted to room 2401 at approximately 2115 via wheel chair from emergency room. Patient was accompanied by transport tech.     Verbal SBAR report received from Joann prior to patient arrival.     Patient ambulated to bed with one assist. Patient alert and oriented X 3. The patient is not having any pain. 0-10 Pain Scale: 0. Admission vital signs: Blood pressure 119/43, pulse 94, temperature 97.1  F (36.2  C), temperature source Oral, resp. rate 16, height 1.626 m (5' 4\"), weight 85.9 kg (189 lb 6 oz), SpO2 93 %, not currently breastfeeding. Patient was oriented to plan of care, call light, tv, telephone, bathroom and visiting hours.     Risk Assessment    The following safety risks were identified during admission: none. Yellow risk band applied: NO.     Skin Initial Assessment    This writer admitted this patient and completed a full skin assessment and Sher score in the Adult PCS flowsheet. Appropriate interventions initiated as needed.     Secondary skin check was declined by patient .    Sher Risk Assessment  Sensory Perception: 4-->no impairment  Moisture: 4-->rarely moist  Activity: 4-->walks frequently  Mobility: 4-->no limitation  Nutrition: 3-->adequate  Friction and Shear: 3-->no apparent problem  Sher Score: 22  Bed Support Surface: Atmos Air mattress    Ruthann Tapia    "

## 2019-03-23 NOTE — CONSULTS
CARE TRANSITION SOCIAL WORK INITIAL ASSESSMENT:      Met with: Patient and Family.    DATA  Active Problems:    NAFL (nonalcoholic fatty liver)    Essential hypertension    Hyperlipidemia LDL goal <100    CKD (chronic kidney disease) stage 3, GFR 30-59 ml/min (H)    Malignant neoplasm of pancreas, unspecified location of malignancy (H)    Type 2 diabetes mellitus with stage 3 chronic kidney disease, with long-term current use of insulin (H)    Bipolar disorder (H)    Morbid obesity (H)    Nicotine abuse    Acute-on-chronic kidney injury (H)    Near syncope    Leukocytosis    Acquired asplenia    Acute pain of left knee    ARF (acute renal failure) (H)       [facility]: ([facility]  Primary Care MD Name: (Eva)  Contact information and PCP information verified: Yes      ASSESSMENT  Cognitive Status: awake, alert and oriented.                      Description of Support System: Supportive, Involved   Who is your support system?: Parent(s)   Support Assessment: Adequate family and caregiver support, Adequate social supports   Insurance Concerns: No Insurance issues identified        This writer met with pt  introduced self and role. Discussed discharge planning and medicare guidelines in regards to home care and SNF benefits. Pt reports that she lives with her foster parents and has lived there for 2 years, [facility] [phone].  Pt will return there when stable.     Pt reports that she has a legal guardian, and foster mom confirms this, Joselynbetito Cano from [facility]   Office: [phone]  Cell: [phone]    This writer left VM with [facility]SS guardian as we DO NOT have any paperwork regarding this.         PLAN    Return back to foster home when stable        Kamille Thomas MSDARLING, LICSW, [phone]

## 2019-03-23 NOTE — ED NOTES
Patient has  [place] to Observation  order. Patient has been given Observation brochure  What does Observation mean to me .  Patient has been given the opportunity to ask questions about observation status and their plan of care.  Jessica Kelsey RN

## 2019-03-23 NOTE — PROGRESS NOTES
[facility] Internal Medicine Progress Note     Date of Service (when I saw the patient): 03/23/2019    REASON FOR ADMISSION / INTERVAL HISTORY:  Divina Delgadillo is a 34 year old female with a history of DM type II, CKD, pancreatic cancer, splenectomy, NAFL, morbid obesity, and bipolar disorder admitted on 3/22/2019. She presents with dizziness. Feels better . See details below.    ASSESSMENT/PLAN:   Dizziness / near syncope  Likely due to dehydration. Had diarrhea all night PTA, BP 97/62 in ED--baseline -150.. Also in KIRSTIE. Significant improvement with hydration.  -continue fluids at lower rate.     KIRSTIE on CKD stage 3  Creatinine 3.47 with BUN 43. Baseline creatinine is 1.4-1.6.  Follows with [facility] nephrology, last visit 1/14/19 with Dr. Dias.  CKD is thought to be due to poorly controlled diabetes, hypertension, and 6 years of lithium therapy.  She is also been on lisinopril 5 mg for 10+ years, increased to 10 mg a few days ago.  ED discussed with Dr. Sandoval @ [facility] nephrology, who recommended holding the lisinopril, hydrating overnight, and rechecking the Cr. If still elevated, call nephrology back.  Renal US negative. Labs improving  -continue fluids     Bilateral low back pain / flank pain  Likely MS. Arndt is a  and was moppinng floors -lifting / moving furniture.Constant bilateral low back and flank pain for last 2-3 days.  Nonradiating.  It has not moved.  UA is unimpressive with 10 WBCs, 4 RBCs, and few bacteria.  Denies dysuria, frequency, urgency.  Denies fevers, chills, nausea.  Afebrile.  She does have an elevated white count but this is chronic and consistent with her baseline as discussed below.     Left knee pain  2-3 days of pain and tenderness in the left popliteal fossa.  No associated swelling or erythema.   Stable.     Type 2 DM with stage 3 CKD, with long-term current use of insulin   A1c 8.2% on 3/22/19. On Lantus 24 units and weekly semaglutide (Thursdays).  - Continue  "Lantus 24 units.  - hold semaglutide for this admission.  - Hypo/hyperglycemia protocols.  - High sliding scale insulin.  - Moderate consistent carb diet     Leukocytosis  WBC 18.5 on admission, consistent with her new baseline. Chronic leukocytosis after removal of tail of pancreas, spleen, and left adrenal gland. Seen by Dr. Rosenthal 8/2018, who states there is no evidence of leukemia or myeloproliferative disorder. He feels it likely represents a reactive phenomenon secondary to smoking, infection, inflammation, or recurrence of malignancy. CT chest/abd/pelvis 8/13/18 does not show any evidence of malignancy.  No intervention     Essential hypertension  Unchanged.  - Continue pta amlodipine, carvedilol.     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 No evidence of recurrence on CT 8/13/18.     Nonalcoholic fatty liver  Alk phos 161, improved from her baseline of 170-250. Transaminases normal. Follows with [facility], last visit 1/31/19.      Bipolar disorder   - Continue pta Abilify, Prozac, Lamictal     Morbid obesity   Contributing factor to all of above.     Nicotine abuse  Current daily smoker, 1/2 ppd. Declined nicotine patch.    DISPO  Home in 1-2 days.        JORGE A KELLER MD   Pg [phone]    DVT Prhylaxis: Pneumatic Compression Devices  Code Status: No Order    ROS:  As described in A/P and Exam.  Otherwise ALL are  negative.    PHYSICAL EXAM:  All vitals have been reviewed    Blood pressure 139/57, pulse 95, temperature 98  F (36.7  C), temperature source Oral, resp. rate 20, height 1.626 m (5' 4\"), weight 88.6 kg (195 lb 5.2 oz), SpO2 97 %, not currently breastfeeding.    I/O this shift:  In: 200 [P.O.:200]  Out: 1000 [Urine:1000]    GENERAL APPEARANCE: healthy, alert and no distress  EYES: conjunctiva clear, eyes grossly normal  HENT: external ears and nose normal "   NECK: supple, no masses or adenopathy  RESP: lungs clear to auscultation - no rales, rhonchi or wheezes  CV: regular rate and rhythm, normal S1 S2, no S3 or S4 and no murmur, click or rub   ABDOMEN: soft, nontender, no HSM or masses and bowel sounds normal  MS: no clubbing, cyanosis; no edema  SKIN: clear without significant rashes or lesions  NEURO: -non-focal moves all 4 extr    ROUTINE  LABS (Last four results)  CMP  Recent Labs   Lab 03/23/19  0618 03/22/19  1735 03/22/19  1423    140 140   POTASSIUM 5.0 4.8 5.2   CHLORIDE 117* 109 110*   CO2 23 25 23   ANIONGAP 4 6 7   * 112* 227*   BUN 34* 43* 43*   CR 1.94* 3.21* 3.47*   GFRESTIMATED 33* 18* 16*   GFRESTBLACK 38* 21* 19*   CLAY 7.8* 8.9 8.8   PHOS  --   --  3.9   PROTTOTAL  --  8.3  --    ALBUMIN  --  3.8 3.5   BILITOTAL  --  0.3  --    ALKPHOS  --  161*  --    AST  --  23  --    ALT  --  45  --      CBC  Recent Labs   Lab 03/23/19 0618 03/22/19  1735 03/22/19  1423   WBC 15.7* 18.5* 18.2*   RBC 3.54* 4.21 3.95   HGB 10.5* 12.7 12.0   HCT 35.0 41.1 38.9   MCV 99 98 99   MCH 29.7 30.2 30.4   MCHC 30.0* 30.9* 30.8*   RDW 14.0 14.1 13.9    416 386     INRNo lab results found in last 7 days.  Arterial Blood GasNo lab results found in last 7 days.    Recent Results (from the past 24 hour(s))   XR Chest 2 Views    Narrative    CHEST TWO VIEWS  3/22/2019 9:16 PM     HISTORY: Syncope    COMPARISON: 12/8/2019      Impression    IMPRESSION: No acute abnormality. Lungs are well-inflated and clear.  Heart size is normal. Epigastric clips noted.    CLARI CEDILLO MD   US Retroperitoneal complete    Narrative    US RENAL COMPLETE  3/23/2019 12:27 AM     INDICATION: New KIRSTIE.    COMPARISON: None    FINDINGS: Ultrasound examination demonstrates no suspicious renal  masses or hydronephrosis on either side. The right kidney is smaller  than the left. Right kidney measures 7.8 cm pole to pole with a  cortical thickness of 0.8 cm. Left kidney measures 10.5  cm in length,  cortical thickness 1.6 cm. Possible mild wall thickening of a bowel  loop incidentally noted in the left upper quadrant.      Impression    IMPRESSION:  1. No hydronephrosis or other acute findings.  2. Mild atrophy of the right kidney.  3. Possible slight wall thickening of a bowel loop in the left upper  quadrant. Correlate clinically.    SHERIF LOPEZ MD   US Lower Extremity Venous Duplex Left    Narrative    US LOWER EXTREMITY VENOUS DUPLEX LEFT  3/23/2019 12:27 AM     HISTORY: Leg pain, history of DVT.    TECHNIQUE: Venous Doppler ultrasound of the lower extremity.?Color  flow and spectral Doppler with waveform analysis performed.    COMPARISON: None.    FINDINGS: Ultrasound of the left leg demonstrates no deep vein  thrombus from the common femoral through popliteal veins or in the  visualized segments of posterior tibial or peroneal veins in the calf.      Impression    IMPRESSION: No DVT identified left leg.    SHERIF LOPEZ MD

## 2019-03-23 NOTE — H&P
[facility]    History and Physical - Hospitalist Service       Date of Admission:  3/22/2019    Assessment & Plan   Divina Delgadillo is a 34 year old female with a history of DM type II, CKD, pancreatic cancer, splenectomy, NAFL, morbid obesity, and bipolar disorder admitted on 3/22/2019. She presents with dizziness since this morning.    Dizziness / near syncope  Gradual onset of dizziness/lightheadedness around 8:00 this morning, continued to increase throughout the morning.  Felt like like she might pass out but no specific near syncopal episode.  She is vague in describing the symptoms, unclear if there was a vertiginous component.  The dizziness would be present while seated but was worse when ambulatory.  Denies chest pain, palpitations, dyspnea, diaphoresis, nausea. Neuro exam is nonfocal.  No nystagmus.  Orthostatics in the ED were negative. D-dimer negative.  CXR negative.  ECG SR, nonischemic.  Labs do show leukocytosis, KIRSTIE, and elevated alk phos, all discussed below.  She does take clozapine which has several black box warnings including for orthostasis, syncope, and bradycardia.  She has been on this medicine since she was 17 years old and has tolerated it well so far.  - PT referral for [facility]  - Telemetry    KIRSTIE on CKD stage 3  Creatinine 3.47 with BUN 43. Baseline creatinine is 1.4-1.6.  Follows with [facility] nephrology, last visit 1/14/19 with Dr. Dias.  CKD is thought to be due to poorly controlled diabetes, hypertension, and 6 years of lithium therapy.  She is also been on lisinopril 5 mg for 10+ years, increased to 10 mg a few days ago.  ED discussed with Dr. Sandoval @ [facility] nephrology, who recommended holding the lisinopril, hydrating overnight, and rechecking the Cr. If still elevated, call nephrology back.  - Renal US   - Bladder scan  - IVF @ 125 / hr  - BMP in AM    Bilateral low back pain / flank pain  Constant bilateral low back and flank pain for last  2-3 days.  Nonradiating.  It has not moved.  UA is unimpressive with 10 WBCs, 4 RBCs, and few bacteria.  Denies dysuria, frequency, urgency.  Denies fevers, chills, nausea.  Afebrile.  She does have an elevated white count but this is chronic and consistent with her baseline as discussed below.  - Renal US    Left knee pain  2-3 days of pain and tenderness in the left popliteal fossa.  No associated swelling or erythema.  History of DVT in her left arm.  She was on oral birth control at the time flew to [place] and back at the beginning of this month.  - US venous left leg    Type 2 DM with stage 3 CKD, with long-term current use of insulin   A1c 8.2% on 3/22/19. On Lantus 24 units and weekly semaglutide (Thursdays).  - Continue Lantus 24 units.  - No ordering semaglutide for this admission.  - Hypo/hyperglycemia protocols.  - High sliding scale insulin.  - Moderate consistent carb diet    Leukocytosis  WBC 18.5 on admission, consistent with her new baseline. Chronic leukocytosis after removal of tail of pancreas, spleen, and left adrenal gland. Seen by Dr. Rosenthal 8/2018, who states there is no evidence of leukemia or myeloproliferative disorder. He feels it likely represents a reactive phenomenon secondary to smoking, infection, inflammation, or recurrence of malignancy. CT chest/abd/pelvis 8/13/18 does not show any evidence of malignancy.  - Follow    Essential hypertension  Unchanged.  - Continue pta amlodipine, carvedilol.    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 No evidence of recurrence on CT 8/13/18.    Nonalcoholic fatty liver  Alk phos 161, improved from her baseline of 170-250. Transaminases normal. Follows with [facility]ology, last visit 1/31/19.     Bipolar disorder   - Continue pta Abilify, Prozac, Lamictal    Morbid obesity   Contributing factor to all of  "above.    Nicotine abuse  Current daily smoker, 1/2 ppd. Declined nicotine patch.         Diet: Regular Diet Adult    DVT Prophylaxis: Pneumatic Compression Devices  Vincent Catheter: not present  Code Status: Full    Disposition Plan   Expected discharge: Tomorrow, recommended to prior living arrangement once renal function at baseline.  Entered: Oli Bang PA-C 03/22/2019, 10:25 PM     The patient's care was discussed with the Attending Physician, Dr. Mcduffie and Patient.    Oli Bang PA-C  [facility]    ______________________________________________________________________    Chief Complaint   Dizziness    History is obtained from the patient    History of Present Illness   Divina Delgadillo is a 34 year old female with a history of CKD, DM 2, hypertension, pancreatic cancer, acquired asplenia, bipolar disorder, and morbid obesity who presents with 1 day of feeling dizzy or lightheaded.  She was feeling fine when she woke up this morning but by the time she got to work around 8:00 she was beginning to feel a little lightheaded.  She thinks there might have been a rotational component.  Her vision felt like it was \"too bright.\"  This continued to increase throughout the morning.  The sensation was worse when upright but was present when seated.  She is unable to identify any clear motion triggers like head rotation or leaning forward or backward.  She felt like she might pass out a few different times and even reports stumbling once when the sensation was especially bad, but denies falling or actually losing consciousness.  Denies associated chest pain, palpitations, dyspnea, LIMON, nausea, vomiting.      Acknowledge experiencing a few episodes diarrhea 2 nights ago, but denies any abdominal pain or nausea now.    She has had bilateral flank/lower back pain for the last 2 days.  No urinary symptoms.  No systemic symptoms concerning for infection.    Review of Systems    The " 10 point Review of Systems is negative other than noted in the HPI or here.     Past Medical History    I have reviewed this patient's medical history and updated it with pertinent information if needed.   Past Medical History:   Diagnosis Date     Cervical high risk HPV (human papillomavirus) test positive 6/20/2017     Depressive disorder, not elsewhere classified      DVT (deep venous thrombosis) (H)      Dysmetabolic syndrome X      Esophageal reflux     GERD     Mild intermittent asthma      Other abnormal heart sounds     Heart murmur     Other specified types of schizophrenia, unspecified condition      Pancreatic cancer (H)     left adrenal gland, partial pancreas, and spleen removed; no chemo or radiation.      Type II or unspecified type diabetes mellitus without mention of complication, not stated as uncontrolled      Unspecified disorder of tympanic membrane        Past Surgical History   I have reviewed this patient's surgical history and updated it with pertinent information if needed.  Past Surgical History:   Procedure Laterality Date     ADRENALECTOMY       PANCREATECTOMY PARTIAL       SPLENECTOMY       SURGICAL HISTORY OF -   10/1/2000    Tympanoplasty     SURGICAL HISTORY OF -   10/1/2000    Tonsillectomy       Social History   I have reviewed this patient's social history and updated it with pertinent information if needed.  Social History     Tobacco Use     Smoking status: Current Every Day Smoker     Packs/day: 0.50     Years: 4.00     Pack years: 2.00     Types: Cigarettes     Smokeless tobacco: Never Used     Tobacco comment: has information about quitplan   Substance Use Topics     Alcohol use: No     Drug use: No       Family History   I have reviewed this patient's family history and updated it with pertinent information if needed.   Family History   Problem Relation Age of Onset     Respiratory Mother         ASTHMA     Allergies Mother      Depression Mother      Heart Disease Father          HEART VALVE REPLACEMENT     Hypertension Father      Diabetes Father      Depression Father      Respiratory Brother      Allergies Brother      Respiratory Sister      Allergies Sister      Depression Sister      Respiratory Sister      Allergies Sister      Depression Sister      Kidney Disease No family hx of        Prior to Admission Medications   Prior to Admission Medications   Prescriptions Last Dose Informant Patient Reported? Taking?   ARIPiprazole (ABILIFY) 30 MG tablet 3/22/2019 at am Self Yes Yes   Sig: Take 30 mg by mouth daily   Blood Glucose Monitoring Suppl (ACCU-CHEK GUIDE) w/Device KIT  Self No No   Si Device daily   Clozapine (CLOZARIL) 200 MG tablet 3/22/2019 at am Self Yes Yes   Sig: Take 200 mg by mouth 2 times daily   FLUoxetine (PROZAC) 20 MG capsule 3/22/2019 at am Self Yes Yes   Sig: Take 20 mg by mouth daily   ULTICARE MINI 31G X 6 MM insulin pen needle  Self No No   Sig: Use 1 pen needles daily   ULTILET SHARPS CONTAINER 1QT MISC  Self No No   Si Device every 30 days   albuterol (PROAIR HFA/PROVENTIL HFA/VENTOLIN HFA) 108 (90 BASE) MCG/ACT Inhaler   No No   Sig: Inhale 2 puffs into the lungs every 6 hours as needed for shortness of breath / dyspnea or wheezing   amLODIPine (NORVASC) 5 MG tablet 3/22/2019 at am Self No Yes   Sig: Take 2 tablets (10 mg) by mouth daily   blood glucose monitoring (NO BRAND SPECIFIED) test strip  Self No No   Si strip by In Vitro route 3 times daily   blood glucose monitoring (SOFTCLIX) lancets  Self No No   Sig: Use to test blood sugar 3 times daily   carvedilol (COREG) 6.25 MG tablet 3/22/2019 at am Self No Yes   Sig: Take 1 tablet (6.25 mg) by mouth 2 times daily (with meals)   insulin glargine (LANTUS SOLOSTAR PEN) 100 UNIT/ML pen 3/22/2019 at am (24 units) Self No Yes   Sig: Inject 25 units once daily   (allow max dose 35 units/day for titration)   lamoTRIgine (LAMICTAL) 25 MG tablet 3/21/2019 at pm Self Yes Yes   Si mg at bedtime    lisinopril (PRINIVIL/ZESTRIL) 10 MG tablet 3/22/2019 at am Self No Yes   Sig: Take 1 tablet (10 mg) by mouth daily   loratadine (CLARITIN) 10 MG tablet 3/22/2019 at am Self No Yes   Sig: Take 1 tablet (10 mg) by mouth every morning   montelukast (SINGULAIR) 10 MG tablet 3/21/2019 at pm Self No Yes   Sig: Take 1 tablet (10 mg) by mouth At Bedtime   omeprazole (PRILOSEC) 20 MG CR capsule 3/22/2019 at am Self No Yes   Sig: Take 1 capsule (20 mg) by mouth daily   ranitidine (ZANTAC) 300 MG tablet 3/21/2019 at pm Self No Yes   Sig: Take 1 tablet (300 mg) by mouth At Bedtime   semaglutide (OZEMPIC) 1 MG/DOSE pen Past Week at Unknown time Self No Yes   Sig: Inject 1 mg Subcutaneous every 7 days   Patient taking differently: Inject 1 mg Subcutaneous every 7 days On thursdays      Facility-Administered Medications: None     Allergies   Allergies   Allergen Reactions     Acetaminophen Other (See Comments)     pt previously tried to overdose on it, PMD does not want pt taking per pt.      Latex      Latex Rash       Physical Exam   Vital Signs: Temp: 96.7  F (35.9  C) Temp src: Oral BP: 110/43 Pulse: 95   Resp: 18 SpO2: 98 % O2 Device: None (Room air)    Weight: 189 lbs 6 oz    General: Appears calm, comfortable, nontoxic. Answers questions quickly and appropriately with clear speech. No apparent distress.  Skin: Pink, warm, dry.  Eyes: PERRL. Sclera are white.  HENT:  Normocephalic, atraumatic. Oropharynx is moist, without lesions or exudate.  Lymph/Hematologic: No occipital, anterior/posterior cervical, supraclavicular, or axillary lymphadenopathy.  CV: RRR, clear S1/S2 without murmur, rub, or gallop. Radial pulses equal bilaterally. No lower extremity edema.  Respiratory: CTA and equal bilaterally. Normal respiratory effort.  GI: Obese, soft, nontender.  CVA tenderness to percussion bilaterally.  Normal bowel sounds.  Musculoskeletal: Moving all extremities well. Normal muscle bulk and tone.  Left leg exhibits some mild  tenderness in the popliteal fossa but no apparent swelling or erythema.  Negative Jie's.  Neuro: Memory and cognition appear normal. CN II - XII grossly intact. Symmetrical extremity strength.  Psych: Normal mood and affect.         Data   Data reviewed today: I reviewed all medications, new labs and imaging results over the last 24 hours. I personally reviewed the EKG tracing showing SR with TRACEY and the chest x-ray image(s) showing No effusions or infiltrates and a normal cardiac silhouette.    Recent Labs   Lab 03/22/19  1735 03/22/19  1423   WBC 18.5* 18.2*   HGB 12.7 12.0   MCV 98 99    386    140   POTASSIUM 4.8 5.2   CHLORIDE 109 110*   CO2 25 23   BUN 43* 43*   CR 3.21* 3.47*   ANIONGAP 6 7   CLAY 8.9 8.8   * 227*   ALBUMIN 3.8 3.5   PROTTOTAL 8.3  --    BILITOTAL 0.3  --    ALKPHOS 161*  --    ALT 45  --    AST 23  --    LIPASE 376  --      Recent Results (from the past 24 hour(s))   XR Chest 2 Views    Narrative    CHEST TWO VIEWS  3/22/2019 9:16 PM     HISTORY: Syncope    COMPARISON: 12/8/2019      Impression    IMPRESSION: No acute abnormality. Lungs are well-inflated and clear.  Heart size is normal. Epigastric clips noted.    CLARI CEDILLO MD

## 2019-03-23 NOTE — PLAN OF CARE
Patient alert and oriented.   Dizziness upon standing has improved but she continues to experience it.   Voiding with no difficulty.   IV infusing.   Patient was diaphoretic this am. BG checked was 149.  Denies pain.   Using call light appropriately, up with stand by assist to bathroom.

## 2019-03-23 NOTE — PLAN OF CARE
Patient is alert and oriented. Reports some dizziness still present but much improved from yesterday. Up to bathroom and ambulated in [place] with stand by assist and steady gait. Uses call light appropriately.  IV fluids infusing as ordered. Appetite good. Medicated with ibuprofen for bilateral low back pain rated 7/10 which she says is tolerable. Denies headache and blurred vision.

## 2019-03-23 NOTE — PHARMACY-MEDICATION REGIMEN REVIEW
Pharmacy Clozapine Note    Date of Service: 3/23/2019  Patient's : 1985  34 year old, female    Current clozapine regimen: 200mg BID  Has there been a known interruption in therapy for greater than/equal to 48 hours? No    Recent ANC Value(s):  Recent Labs   Lab Test 19  1735 19  1423 19  1116 19  1129 18  1516 10/18/18  1510 18  0816   ANEU 10.0* 11.5* 10.1* 8.7* 9.1* 9.7* 7.7       Is the patient enrolled in the clozapine REMS program? Yes  Ordering prescriber: Patrica Rivero  Is this provider certified in the clozapine REMS program? Yes  Is the ANC within recommended limits? Yes  Does the patient have any signs or symptoms of infection, including fever or sore throat? Patient has had chronic leukocytosis which has been worked up    Plan:  1. Continue clozapine therapy at 200 mg PO.  2. A WBC with differential will be ordered at least weekly.  ANC values will be entered into the REMS program.  3. Signs/symptoms of infection will be monitored daily.    Cindy Mota, PharmD

## 2019-03-24 VITALS
RESPIRATION RATE: 18 BRPM | DIASTOLIC BLOOD PRESSURE: 63 MMHG | OXYGEN SATURATION: 97 % | SYSTOLIC BLOOD PRESSURE: 154 MMHG | HEIGHT: 64 IN | BODY MASS INDEX: 33.72 KG/M2 | TEMPERATURE: 98.3 F | HEART RATE: 97 BPM | WEIGHT: 197.53 LBS

## 2019-03-24 LAB
ANION GAP SERPL CALCULATED.3IONS-SCNC: 5 MMOL/L (ref 3–14)
BUN SERPL-MCNC: 23 MG/DL (ref 7–30)
CALCIUM SERPL-MCNC: 8.2 MG/DL (ref 8.5–10.1)
CHLORIDE SERPL-SCNC: 117 MMOL/L (ref 94–109)
CO2 SERPL-SCNC: 23 MMOL/L (ref 20–32)
CREAT SERPL-MCNC: 1.39 MG/DL (ref 0.52–1.04)
ERYTHROCYTE [DISTWIDTH] IN BLOOD BY AUTOMATED COUNT: 14 % (ref 10–15)
GFR SERPL CREATININE-BSD FRML MDRD: 50 ML/MIN/{1.73_M2}
GLUCOSE BLDC GLUCOMTR-MCNC: 155 MG/DL (ref 70–99)
GLUCOSE BLDC GLUCOMTR-MCNC: 199 MG/DL (ref 70–99)
GLUCOSE SERPL-MCNC: 126 MG/DL (ref 70–99)
HCT VFR BLD AUTO: 35.7 % (ref 35–47)
HGB BLD-MCNC: 11 G/DL (ref 11.7–15.7)
MCH RBC QN AUTO: 30.6 PG (ref 26.5–33)
MCHC RBC AUTO-ENTMCNC: 30.8 G/DL (ref 31.5–36.5)
MCV RBC AUTO: 99 FL (ref 78–100)
PLATELET # BLD AUTO: 372 10E9/L (ref 150–450)
POTASSIUM SERPL-SCNC: 5.1 MMOL/L (ref 3.4–5.3)
RBC # BLD AUTO: 3.59 10E12/L (ref 3.8–5.2)
SODIUM SERPL-SCNC: 145 MMOL/L (ref 133–144)
WBC # BLD AUTO: 15.5 10E9/L (ref 4–11)

## 2019-03-24 PROCEDURE — 00000146 ZZHCL STATISTIC GLUCOSE BY METER IP

## 2019-03-24 PROCEDURE — A9270 NON-COVERED ITEM OR SERVICE: HCPCS | Mod: GY | Performed by: PHYSICIAN ASSISTANT

## 2019-03-24 PROCEDURE — 85027 COMPLETE CBC AUTOMATED: CPT | Performed by: INTERNAL MEDICINE

## 2019-03-24 PROCEDURE — 36415 COLL VENOUS BLD VENIPUNCTURE: CPT | Performed by: INTERNAL MEDICINE

## 2019-03-24 PROCEDURE — 99239 HOSP IP/OBS DSCHRG MGMT >30: CPT | Performed by: INTERNAL MEDICINE

## 2019-03-24 PROCEDURE — 25000131 ZZH RX MED GY IP 250 OP 636 PS 637: Mod: GY | Performed by: PHYSICIAN ASSISTANT

## 2019-03-24 PROCEDURE — 25000132 ZZH RX MED GY IP 250 OP 250 PS 637: Mod: GY | Performed by: PHYSICIAN ASSISTANT

## 2019-03-24 PROCEDURE — 80048 BASIC METABOLIC PNL TOTAL CA: CPT | Performed by: INTERNAL MEDICINE

## 2019-03-24 RX ORDER — LISINOPRIL 10 MG/1
5 TABLET ORAL DAILY
Qty: 90 TABLET | Refills: 3 | COMMUNITY
Start: 2019-03-24 | End: 2020-01-02

## 2019-03-24 RX ADMIN — ARIPIPRAZOLE 30 MG: 15 TABLET ORAL at 08:31

## 2019-03-24 RX ADMIN — CLOZAPINE 200 MG: 100 TABLET ORAL at 08:31

## 2019-03-24 RX ADMIN — OMEPRAZOLE 20 MG: 20 CAPSULE, DELAYED RELEASE ORAL at 08:31

## 2019-03-24 RX ADMIN — FLUOXETINE 20 MG: 20 CAPSULE ORAL at 08:31

## 2019-03-24 RX ADMIN — INSULIN GLARGINE 24 UNITS: 100 INJECTION, SOLUTION SUBCUTANEOUS at 08:31

## 2019-03-24 RX ADMIN — AMLODIPINE BESYLATE 10 MG: 10 TABLET ORAL at 08:31

## 2019-03-24 RX ADMIN — CARVEDILOL 6.25 MG: 6.25 TABLET, FILM COATED ORAL at 08:31

## 2019-03-24 RX ADMIN — LORATADINE 10 MG: 10 TABLET ORAL at 08:31

## 2019-03-24 ASSESSMENT — ACTIVITIES OF DAILY LIVING (ADL)
ADLS_ACUITY_SCORE: 11

## 2019-03-24 ASSESSMENT — MIFFLIN-ST. JEOR: SCORE: 1586

## 2019-03-24 NOTE — PLAN OF CARE
Patient has been up independently in room today with steady gait and no dizziness. Denied pain.   [place] [facility] DISCHARGE NOTE    Patient discharged to home at 12:18 PM via ambulation. Accompanied by other:foster mother and staff. Discharge instructions reviewed with patient and foster mother, opportunity offered to ask questions. Prescriptions - None ordered for discharge. All belongings sent with patient.    Danelle Graham

## 2019-03-24 NOTE — DISCHARGE SUMMARY
"[facility] Discharge Summary    Divina Delgadillo MRN# [id number]   Age: 34 year old YOB: 1985     Date of Admission:  3/22/2019  Date of Discharge::  3/24/2019  Admitting Physician:  Magy Orellana MD  Discharge Physician:  Magy Orellana MD  Primary Physician: Jaleesa Velasquez  Transferring Facility: N/A     Home clinic: [facility]          Admission Diagnoses:   Near syncope [R55]  Acute renal failure superimposed on stage 3 chronic kidney disease, unspecified acute renal failure type (H) [N17.9, N18.3]          Discharge Diagnosis:   Principle diagnosis: Dizziness / near syncope      Secondary diagnoses:  Active Problems:    NAFL (nonalcoholic fatty liver)    Essential hypertension    Hyperlipidemia LDL goal <100    CKD (chronic kidney disease) stage 3, GFR 30-59 ml/min (H)    Malignant neoplasm of pancreas, unspecified location of malignancy (H)    Type 2 diabetes mellitus with stage 3 chronic kidney disease, with long-term current use of insulin (H)    Bipolar disorder (H)    Morbid obesity (H)    Nicotine abuse    Acute-on-chronic kidney injury (H)    Near syncope    Leukocytosis    Acquired asplenia    Acute pain of left knee    ARF (acute renal failure) (H)         Brief History of Presenting Illness:   As per admit hx  Divina Delgadillo is a 34 year old female with a history of CKD, DM 2, hypertension, pancreatic cancer, acquired asplenia, bipolar disorder, and morbid obesity who presents with 1 day of feeling dizzy or lightheaded.  She was feeling fine when she woke up this morning but by the time she got to work around 8:00 she was beginning to feel a little lightheaded.  She thinks there might have been a rotational component.  Her vision felt like it was \"too bright.\"  This continued to increase throughout the morning.  The sensation was worse when upright but was present when seated.  She is unable to identify any clear motion triggers like head rotation or " leaning forward or backward.  She felt like she might pass out a few different times and even reports stumbling once when the sensation was especially bad, but denies falling or actually losing consciousness.  Denies associated chest pain, palpitations, dyspnea, LIMON, nausea, vomiting.       Acknowledge experiencing a few episodes diarrhea 2 nights ago, but denies any abdominal pain or nausea now.     She has had bilateral flank/lower back pain for the last 2 days.  No urinary symptoms.  No systemic symptoms concerning for infection.    No results found for this or any previous visit (from the past 24 hour(s)).         Hospital Course:   Dizziness / near syncope  Likely due to dehydration. Had diarrhea all night PTA, BP 97/62 in ED--baseline -150.. Also in KIRSTIE. Significant improvement with hydration.  Resolved now.     KIRSTIE on CKD stage 3  Creatinine 3.47 with BUN 43. Baseline creatinine is 1.4-1.6.  Follows with [facility] nephrology, last visit 1/14/19 with Dr. Dias.  CKD is thought to be due to poorly controlled diabetes, hypertension, and 6 years of lithium therapy.  She is also been on lisinopril 5 mg for 10+ years, increased to 10 mg a few days ago.  ED discussed with Dr. Sandoval @ [facility] nephrology, who recommended holding the lisinopril, hydrating overnight, and rechecking the Cr. Renal US negative. Labs improved to baseline.      Bilateral low back pain / flank pain  Likely MS. Arndt is a  and was moppinng floors -lifting / moving furniture.  resolved     Left knee pain  2-3 days of pain and tenderness in the left popliteal fossa.  No associated swelling or erythema.   Resolved.      Type 2 DM with stage 3 CKD, with long-term current use of insulin   A1c 8.2% on 3/22/19. On Lantus 24 units and weekly semaglutide (Thursdays).  Continue home regimen.    Leukocytosis  WBC 18.5 on admission, consistent with her new baseline. Chronic leukocytosis after removal of tail of pancreas, spleen, and left  adrenal gland. Seen by Dr. Rosenthal 8/2018, who states there is no evidence of leukemia or myeloproliferative disorder. He feels it likely represents a reactive phenomenon secondary to smoking, infection, inflammation, or recurrence of malignancy. CT chest/abd/pelvis 8/13/18 does not show any evidence of malignancy.  No intervention     Essential hypertension  - Continue pta amlodipine, carvedilol. Decrease lisinopril to 5 mg /day and f/u. If elevated BP, could consider increasing lisinopril dose again and f/u labs.     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 No evidence of recurrence on CT 8/13/18.     Nonalcoholic fatty liver  Alk phos 161, improved from her baseline of 170-250. Transaminases normal. Follows with [facility], last visit 1/31/19.      Bipolar disorder   - Continue pta Abilify, Prozac, Lamictal     Morbid obesity   Contributing factor to all of above.              Procedures:   No procedures performed during this admission         Allergies:      Allergies   Allergen Reactions     Acetaminophen Other (See Comments)     pt previously tried to overdose on it, PMD does not want pt taking per pt.      Latex      Latex Rash             Medications Prior to Admission:     Medications Prior to Admission   Medication Sig Dispense Refill Last Dose     amLODIPine (NORVASC) 5 MG tablet Take 2 tablets (10 mg) by mouth daily 90 tablet 1 3/22/2019 at am     ARIPiprazole (ABILIFY) 30 MG tablet Take 30 mg by mouth daily   3/22/2019 at am     carvedilol (COREG) 6.25 MG tablet Take 1 tablet (6.25 mg) by mouth 2 times daily (with meals) 60 tablet 1 3/22/2019 at am     Clozapine (CLOZARIL) 200 MG tablet Take 200 mg by mouth 2 times daily   3/22/2019 at am     FLUoxetine (PROZAC) 20 MG capsule Take 20 mg by mouth daily   3/22/2019 at am     insulin glargine (LANTUS SOLOSTAR PEN) 100  UNIT/ML pen Inject 25 units once daily   (allow max dose 35 units/day for titration) 15 mL 2 3/22/2019 at am (24 units)     lamoTRIgine (LAMICTAL) 25 MG tablet 100 mg at bedtime  0 3/21/2019 at pm     loratadine (CLARITIN) 10 MG tablet Take 1 tablet (10 mg) by mouth every morning 90 tablet 1 3/22/2019 at am     montelukast (SINGULAIR) 10 MG tablet Take 1 tablet (10 mg) by mouth At Bedtime 90 tablet 1 3/21/2019 at pm     omeprazole (PRILOSEC) 20 MG CR capsule Take 1 capsule (20 mg) by mouth daily 60 capsule 5 3/22/2019 at am     ranitidine (ZANTAC) 300 MG tablet Take 1 tablet (300 mg) by mouth At Bedtime 90 tablet 1 3/21/2019 at pm     semaglutide (OZEMPIC) 1 MG/DOSE pen Inject 1 mg Subcutaneous every 7 days (Patient taking differently: Inject 1 mg Subcutaneous every 7 days On thursdays) 3 mL 3 Past Week at Unknown time     albuterol (PROAIR HFA/PROVENTIL HFA/VENTOLIN HFA) 108 (90 BASE) MCG/ACT Inhaler Inhale 2 puffs into the lungs every 6 hours as needed for shortness of breath / dyspnea or wheezing 1 Inhaler 3 Taking     blood glucose monitoring (NO BRAND SPECIFIED) test strip 1 strip by In Vitro route 3 times daily 100 each 11 Taking     blood glucose monitoring (SOFTCLIX) lancets Use to test blood sugar 3 times daily 100 each 11 Taking     Blood Glucose Monitoring Suppl (ACCU-CHEK GUIDE) w/Device KIT 1 Device daily 1 kit 0 Taking     ULTICARE MINI 31G X 6 MM insulin pen needle Use 1 pen needles daily 100 each 11 Taking     ULTILET SHARPS CONTAINER 1QT MISC 1 Device every 30 days 1 each 11 Taking             Discharge Medications:     Current Discharge Medication List      CONTINUE these medications which have CHANGED    Details   lisinopril (PRINIVIL/ZESTRIL) 10 MG tablet Take 0.5 tablets (5 mg) by mouth daily  Qty: 90 tablet, Refills: 3    Associated Diagnoses: Essential hypertension         CONTINUE these medications which have NOT CHANGED    Details   amLODIPine (NORVASC) 5 MG tablet Take 2 tablets (10 mg) by  mouth daily  Qty: 90 tablet, Refills: 1    Associated Diagnoses: Essential hypertension      ARIPiprazole (ABILIFY) 30 MG tablet Take 30 mg by mouth daily      carvedilol (COREG) 6.25 MG tablet Take 1 tablet (6.25 mg) by mouth 2 times daily (with meals)  Qty: 60 tablet, Refills: 1    Associated Diagnoses: Essential hypertension      Clozapine (CLOZARIL) 200 MG tablet Take 200 mg by mouth 2 times daily      FLUoxetine (PROZAC) 20 MG capsule Take 20 mg by mouth daily      insulin glargine (LANTUS SOLOSTAR PEN) 100 UNIT/ML pen Inject 25 units once daily   (allow max dose 35 units/day for titration)  Qty: 15 mL, Refills: 2    Comments: Dose updated, do not fill today.  Associated Diagnoses: Type 2 diabetes mellitus with stage 3 chronic kidney disease, with long-term current use of insulin (H)      lamoTRIgine (LAMICTAL) 25 MG tablet 100 mg at bedtime  Refills: 0      loratadine (CLARITIN) 10 MG tablet Take 1 tablet (10 mg) by mouth every morning  Qty: 90 tablet, Refills: 1    Associated Diagnoses: Chronic seasonal allergic rhinitis      montelukast (SINGULAIR) 10 MG tablet Take 1 tablet (10 mg) by mouth At Bedtime  Qty: 90 tablet, Refills: 1    Associated Diagnoses: Mild intermittent asthma with acute exacerbation      omeprazole (PRILOSEC) 20 MG CR capsule Take 1 capsule (20 mg) by mouth daily  Qty: 60 capsule, Refills: 5    Associated Diagnoses: Gastroesophageal reflux disease without esophagitis      ranitidine (ZANTAC) 300 MG tablet Take 1 tablet (300 mg) by mouth At Bedtime  Qty: 90 tablet, Refills: 1    Associated Diagnoses: Gastroesophageal reflux disease without esophagitis      semaglutide (OZEMPIC) 1 MG/DOSE pen Inject 1 mg Subcutaneous every 7 days  Qty: 3 mL, Refills: 3    Associated Diagnoses: Type 2 diabetes mellitus with stage 3 chronic kidney disease, with long-term current use of insulin (H)      albuterol (PROAIR HFA/PROVENTIL HFA/VENTOLIN HFA) 108 (90 BASE) MCG/ACT Inhaler Inhale 2 puffs into the  "lungs every 6 hours as needed for shortness of breath / dyspnea or wheezing  Qty: 1 Inhaler, Refills: 3    Associated Diagnoses: Mild intermittent asthma with acute exacerbation      blood glucose monitoring (NO BRAND SPECIFIED) test strip 1 strip by In Vitro route 3 times daily  Qty: 100 each, Refills: 11    Associated Diagnoses: Type 2 diabetes mellitus with stage 3 chronic kidney disease, with long-term current use of insulin (H)      blood glucose monitoring (SOFTCLIX) lancets Use to test blood sugar 3 times daily  Qty: 100 each, Refills: 11    Associated Diagnoses: Type 2 diabetes mellitus with stage 3 chronic kidney disease, with long-term current use of insulin (H)      Blood Glucose Monitoring Suppl (ACCU-CHEK GUIDE) w/Device KIT 1 Device daily  Qty: 1 kit, Refills: 0    Comments: : [id number] lot: [id number]  2/24/2020  Associated Diagnoses: Type 2 diabetes mellitus with stage 3 chronic kidney disease, with long-term current use of insulin (H)      ULTICARE MINI 31G X 6 MM insulin pen needle Use 1 pen needles daily  Qty: 100 each, Refills: 11    Associated Diagnoses: Type 2 diabetes mellitus with stage 3 chronic kidney disease, without long-term current use of insulin (H)      ULTILET SHARPS CONTAINER 1QT MISC 1 Device every 30 days  Qty: 1 each, Refills: 11    Associated Diagnoses: Type 2 diabetes mellitus with stage 3 chronic kidney disease, without long-term current use of insulin (H)                   Consultations:   No consultations were requested during this admission            Discharge Exam:   Blood pressure 154/63, pulse 97, temperature 98.3  F (36.8  C), temperature source Oral, resp. rate 18, height 1.626 m (5' 4\"), weight 89.6 kg (197 lb 8.5 oz), SpO2 97 %, not currently breastfeeding.  GENERAL APPEARANCE: healthy, alert and no distress  EYES: conjunctiva clear, eyes grossly normal  HENT: external ears and nose normal   NECK: supple, no masses or adenopathy  RESP: lungs clear to auscultation - " no rales, rhonchi or wheezes  CV: regular rate and rhythm, normal S1 S2, no S3 or S4 and no murmur, click or rub   ABDOMEN: soft, nontender, no HSM or masses and bowel sounds normal  MS: no clubbing, cyanosis; no edema  SKIN: clear without significant rashes or lesions  NEURO: Normal strength and tone, sensory exam grossly normal, mentation intact and speech normal    Unresulted Labs Ordered in the Past 30 Days of this Admission     No orders found from 1/21/2019 to 3/23/2019.          No results found for this or any previous visit (from the past 24 hour(s)).         Pending Tests at Discharge:   None         Discharge Instructions and Follow-Up:   Discharge diet: Regular   Discharge activity: Activity as tolerated   Discharge follow-up: Follow up with primary care provider in 1-2 weeks           Discharge Disposition:   Discharged to home      Attestation:  I have reviewed today's vital signs, notes, medications, labs and imaging.    Time Spent on this Encounter   I, Magy Orellana, personally saw the patient today and spent greater than 30 minutes discharging this patient.    Magy Orellana MD

## 2019-03-24 NOTE — PLAN OF CARE
Alert and oriented.  Denies any dizziness. States she is feeling much better. Steady on feet.   Good appetite, ate all of her treats in her room.   Voiding  clear yellow urine.   Diaphoretic this am, linen and gown change.   Using call light appropriately.  IV saline locked.

## 2019-03-24 NOTE — PHARMACY - DISCHARGE MEDICATION RECONCILIATION
Discharge medication review for this patient is complete. Pharmacist assisted with medication reconciliation of discharge medications with prior to admission medications.     The following changes were made to the discharge medication list based on pharmacist review:  Added:  none  Discontinued: none  Changed: none      Patient's Discharge Medication List  - medications as listed on After Visit Summary (AVS)     Review of your medicines      CONTINUE these medicines which may have CHANGED, or have new prescriptions. If we are uncertain of the size of tablets/capsules you have at home, strength may be listed as something that might have changed.      Dose / Directions   lisinopril 10 MG tablet  Commonly known as:  PRINIVIL/ZESTRIL  This may have changed:  how much to take  Notes to patient:  This was on hold while hospitalized. Resume.       Dose:  5 mg  Take 0.5 tablets (5 mg) by mouth daily  Quantity:  90 tablet  Refills:  3     semaglutide 1 MG/DOSE pen  Commonly known as:  OZEMPIC  This may have changed:  additional instructions  Notes to patient:  Resume when due      Dose:  1 mg  Inject 1 mg Subcutaneous every 7 days  Quantity:  3 mL  Refills:  3        CONTINUE these medicines which have NOT CHANGED      Dose / Directions   ABILIFY 30 MG tablet  Generic drug:  ARIPiprazole      Dose:  30 mg  Take 30 mg by mouth daily  Refills:  0     ACCU-CHEK GUIDE w/Device Kit      Dose:  1 Device  1 Device daily  Quantity:  1 kit  Refills:  0     albuterol 108 (90 Base) MCG/ACT inhaler  Commonly known as:  PROAIR HFA/PROVENTIL HFA/VENTOLIN HFA  Used for:  Mild intermittent asthma with acute exacerbation  Notes to patient:  Not given, resume as needed      Dose:  2 puff  Inhale 2 puffs into the lungs every 6 hours as needed for shortness of breath / dyspnea or wheezing  Quantity:  1 Inhaler  Refills:  3     amLODIPine 5 MG tablet  Commonly known as:  NORVASC      Dose:  10 mg  Take 2 tablets (10 mg) by mouth daily  Quantity:   90 tablet  Refills:  1     blood glucose monitoring lancets      Use to test blood sugar 3 times daily  Quantity:  100 each  Refills:  11     blood glucose test strip  Commonly known as:  NO BRAND SPECIFIED      Dose:  1 strip  1 strip by In Vitro route 3 times daily  Quantity:  100 each  Refills:  11     carvedilol 6.25 MG tablet  Commonly known as:  COREG      Dose:  6.25 mg  Take 1 tablet (6.25 mg) by mouth 2 times daily (with meals)  Quantity:  60 tablet  Refills:  1     cloZAPine 200 MG tablet  Commonly known as:  CLOZARIL      Dose:  200 mg  Take 200 mg by mouth 2 times daily  Refills:  0     FLUoxetine 20 MG capsule  Commonly known as:  PROzac      Dose:  20 mg  Take 20 mg by mouth daily  Refills:  0     insulin glargine 100 UNIT/ML pen  Commonly known as:  LANTUS SOLOSTAR      Inject 25 units once daily   (allow max dose 35 units/day for titration)  Quantity:  15 mL  Refills:  2     lamoTRIgine 25 MG tablet  Commonly known as:  LaMICtal      100 mg at bedtime  Refills:  0     loratadine 10 MG tablet  Commonly known as:  CLARITIN  Used for:  Chronic seasonal allergic rhinitis      Dose:  10 mg  Take 1 tablet (10 mg) by mouth every morning  Quantity:  90 tablet  Refills:  1     montelukast 10 MG tablet  Commonly known as:  SINGULAIR  Used for:  Mild intermittent asthma with acute exacerbation      Dose:  10 mg  Take 1 tablet (10 mg) by mouth At Bedtime  Quantity:  90 tablet  Refills:  1     omeprazole 20 MG DR capsule  Commonly known as:  priLOSEC  Used for:  Gastroesophageal reflux disease without esophagitis      Dose:  20 mg  Take 1 capsule (20 mg) by mouth daily  Quantity:  60 capsule  Refills:  5     ranitidine 300 MG tablet  Commonly known as:  ZANTAC  Used for:  Gastroesophageal reflux disease without esophagitis  Notes to patient:  Not given, resume      Dose:  300 mg  Take 1 tablet (300 mg) by mouth At Bedtime  Quantity:  90 tablet  Refills:  1     ULTICARE MINI 31G X 6 MM miscellaneous  Used for:   Type 2 diabetes mellitus with stage 3 chronic kidney disease, without long-term current use of insulin (H)  Generic drug:  insulin pen needle      Use 1 pen needles daily  Quantity:  100 each  Refills:  11     ULTILET SHARPS CONTAINER 1QT Misc  Used for:  Type 2 diabetes mellitus with stage 3 chronic kidney disease, without long-term current use of insulin (H)      Dose:  1 Device  1 Device every 30 days  Quantity:  1 each  Refills:  11

## 2019-03-26 ENCOUNTER — TELEPHONE (OUTPATIENT)
Dept: FAMILY MEDICINE | Facility: CLINIC | Age: 34
End: 2019-03-26

## 2019-03-26 ENCOUNTER — PATIENT OUTREACH (OUTPATIENT)
Dept: NURSING | Facility: CLINIC | Age: 34
End: 2019-03-26

## 2019-03-26 DIAGNOSIS — E11.22 TYPE 2 DIABETES MELLITUS WITH STAGE 3 CHRONIC KIDNEY DISEASE, WITH LONG-TERM CURRENT USE OF INSULIN (H): Primary | ICD-10-CM

## 2019-03-26 DIAGNOSIS — Z79.4 TYPE 2 DIABETES MELLITUS WITH STAGE 3 CHRONIC KIDNEY DISEASE, WITH LONG-TERM CURRENT USE OF INSULIN (H): Primary | ICD-10-CM

## 2019-03-26 DIAGNOSIS — N18.30 TYPE 2 DIABETES MELLITUS WITH STAGE 3 CHRONIC KIDNEY DISEASE, WITH LONG-TERM CURRENT USE OF INSULIN (H): Primary | ICD-10-CM

## 2019-03-26 PROCEDURE — 99207 ZZC HEALTH COACHING, NO CHARGE: CPT

## 2019-03-26 NOTE — TELEPHONE ENCOUNTER
"ED/Discharge Protocol    \"Hi, my name is Reyes Arthur, a registered nurse, and I am calling on behalf of Dr. Velasquez's office at [place].  I am calling to follow up and see how things are going for you after your recent visit.\"    \"I see that you were in the Hospital on 3/22/19-3/24/19.    How are you doing now that you are home?\" Patient's representative (Lorin) from [facility] stated that patient has been doing much better but is still not drinking enough fluids.  Lorin states they are tracking patient's fluid intake and pushing fluids.  They are sure patient is at least drinking two big glasses a day but statess that patient will lie about drinking fluids too.    Is patient experiencing symptoms that may require a hospital visit?  No    Discharge Instructions    \"Let's review your discharge instructions.  What is/are the follow-up recommendations?  Pt. Response: To follow up with PCP    \"Were you instructed to make a follow-up appointment?\"  Pt. Response: Yes.  Has appointment been made?   Yes    Follow up appointment was made then rescheduled.  \"When you see the provider, I would recommend that you bring your discharge instructions with you.    Medications    \"How many new medications are you on since your hospitalization/ED visit?\"    0-1  \"How many of your current medicines changed (dose, timing, name, etc.) while you were in the hospital/ED visit?\"   0-1  \"Do you have questions about your medications?\"   No  \"Were you newly diagnosed with heart failure, COPD, diabetes or did you have a heart attack?\"   No  For patients on insulin: \"Did you start on insulin in the hospital or did you have your insulin dose changed?\"   No    Medication reconciliation completed? Yes    Was MTM referral placed (*Make sure to put transitions as reason for referral)?   No, Patient did not have any changes to medications made while in hospital and patient lives in adult foster care with trained professionals administering " "medications.    Call Summary    \"Do you have any questions or concerns about your condition or care plan at the moment?\"    No  Triage nurse advice given: Writer assisted Lorin to reschedule patient's follow up visit.    Patient was in ER 2 in the past year (assess appropriateness of ER visits.)      \"If you have questions or things don't continue to improve, we encourage you contact us through the main clinic number,  [phone].  Even if the clinic is not open, triage nurses are available 24/7 to help you.     We would like you to know that our clinic has extended hours (provide information).  We also have urgent care (provide details on closest location and hours/contact info)\"      \"Thank you for your time and take care!\"    No further action necessary.  Encounter closed.    Reyes Arthur RN        "

## 2019-03-26 NOTE — PROGRESS NOTES
March 26, 2019    Health Coaching Progress Note    Patient Name: Divina Delgadillo Date: March 26, 2019      Session Length: 20      DATA    PRM Master Survey Scores Reviewed: Yes    Core Healthy Days Survey:         EMY Score (Last Two) 5/10/2018   EMY Raw Score 35   Activation Score 72.1   EMY Level 3       PHQ-2 Score 2/14/2019 6/14/2018   PHQ-2 Total Score (Adult) - Positive if 3 or more points; Administer PHQ-9 if positive 1 0       PHQ-9 SCORE 11/27/2018 2/14/2019   PHQ-9 Total Score 16 12       Treatment Objective(s) Addressed in This Session:  Target Behavior(s): diet/weight loss and disease management/lifestyle changes increasing exercise    Current Stressors / Issues:  Divina informs writer that she spent a couple days in the hospital recently.     What Patient Does Well:   Divina shares that her blood glucose levels are improving.  Previous Successes:   Divina says she has been getting to the gym more (few times per week).  Areas in Need of Improvement:   Divina notes that she was in the hospital due to her kidney health and as a result is needing to work on drinking more water and plans to do so.     Writer follows up on tobacco cessation today also and informs Divina that the [employer] Quit Cash Challenge is going on the month of April this year and that registration goes through 3/31. Divina says she may consider this. Writer notes to follow up.  Barriers to Change:   Divina reports no barriers at this time.  Reasons for Change:   Divina wants to make changes for her health and self-esteem.  Plan/Goal for the Next 4 Weeks:   Goals Addressed as of 3/26/2019 at 4:40 PM                 Today    3/13/19      Being Active (pt-stated)   20%      Added 2/13/19 by Mahin Penny     My Goal: I will continue exercising most days of the week-targeting 3 times (minimum)-and begin incorporating strength training    What I need to meet my goal: Divina plans to discuss with her aunt who is a     I plan to meet my  goal by this date: 4/23/2019    Currently going to [employer] most days        Reducing Risks (pt-stated)   20%  On track    Added 12/5/18 by Mahin Penny     My Goal: I will continue thinking about tobacco cessation    What I need to meet my goal: nothing needed, may sign up for quit cash challenge    I plan to meet my goal by this date: 4/23/2019            Intervention:  Motivational Interviewing    MI Intervention: Expressed Empathy/Understanding, Supported Autonomy, Collaboration, Evocation, Permission to raise concern or advise, Open-ended questions, Reflections: simple and complex, Change talk (evoked) and Reframe     Change Talk Expressed by the Patient: Desire to change Ability to change Reasons to change Committment to change Activation Taking steps    Provider Response to Change Talk: E - Evoked more info from patient about behavior change, A - Affirmed patient's thoughts, decisions, or attempts at behavior change, R - Reflected patient's change talk and S - Summarized patient's change talk statements    Assessment / Progress on Treatment Objective(s) / Homework:    Satisfactory progress - ACTION (Actively working towards change); Intervened by reinforcing change plan / affirming steps taken     Plan: (Homework, other):  Patient was encouraged to continue to seek condition-related information and education, as well as schedule a follow up appointment with the Health  in 4 weeks. Patient has set self-identified goals and will monitor progress until the next appointment.  Next visit scheduled for April 23 at 3:30PM.      Mahin Penny, Health   3/26/2019    4:40 PM

## 2019-04-04 ENCOUNTER — OFFICE VISIT (OUTPATIENT)
Dept: FAMILY MEDICINE | Facility: CLINIC | Age: 34
End: 2019-04-04
Payer: MEDICARE

## 2019-04-04 VITALS
BODY MASS INDEX: 32.27 KG/M2 | WEIGHT: 189 LBS | SYSTOLIC BLOOD PRESSURE: 144 MMHG | RESPIRATION RATE: 16 BRPM | HEART RATE: 84 BPM | HEIGHT: 64 IN | OXYGEN SATURATION: 100 % | TEMPERATURE: 98.6 F | DIASTOLIC BLOOD PRESSURE: 80 MMHG

## 2019-04-04 DIAGNOSIS — E66.01 MORBID OBESITY (H): ICD-10-CM

## 2019-04-04 DIAGNOSIS — N18.30 CKD (CHRONIC KIDNEY DISEASE) STAGE 3, GFR 30-59 ML/MIN (H): ICD-10-CM

## 2019-04-04 DIAGNOSIS — I10 ESSENTIAL HYPERTENSION: Primary | ICD-10-CM

## 2019-04-04 LAB
ANION GAP SERPL CALCULATED.3IONS-SCNC: 4 MMOL/L (ref 3–14)
BUN SERPL-MCNC: 20 MG/DL (ref 7–30)
CALCIUM SERPL-MCNC: 9.1 MG/DL (ref 8.5–10.1)
CHLORIDE SERPL-SCNC: 109 MMOL/L (ref 94–109)
CO2 SERPL-SCNC: 25 MMOL/L (ref 20–32)
CREAT SERPL-MCNC: 1.23 MG/DL (ref 0.52–1.04)
GFR SERPL CREATININE-BSD FRML MDRD: 58 ML/MIN/{1.73_M2}
GLUCOSE SERPL-MCNC: 142 MG/DL (ref 70–99)
POTASSIUM SERPL-SCNC: 4.5 MMOL/L (ref 3.4–5.3)
SODIUM SERPL-SCNC: 138 MMOL/L (ref 133–144)

## 2019-04-04 PROCEDURE — 99214 OFFICE O/P EST MOD 30 MIN: CPT | Performed by: NURSE PRACTITIONER

## 2019-04-04 PROCEDURE — 80048 BASIC METABOLIC PNL TOTAL CA: CPT | Performed by: NURSE PRACTITIONER

## 2019-04-04 PROCEDURE — 36415 COLL VENOUS BLD VENIPUNCTURE: CPT | Performed by: NURSE PRACTITIONER

## 2019-04-04 RX ORDER — CARVEDILOL 6.25 MG/1
TABLET ORAL
Qty: 75 TABLET | Refills: 1 | Status: SHIPPED | OUTPATIENT
Start: 2019-04-04 | End: 2019-05-03

## 2019-04-04 ASSESSMENT — MIFFLIN-ST. JEOR: SCORE: 1547.3

## 2019-04-04 ASSESSMENT — PAIN SCALES - GENERAL: PAINLEVEL: NO PAIN (0)

## 2019-04-04 NOTE — PROGRESS NOTES
SUBJECTIVE:   Divina Delgadillo is a 34 year old female who presents to clinic today for the following health issues: was admitted due to dehydration and worsening renal function. Feeling better today. Lisinopril was reduced to 5 mg daily, BP still not controlled well.     Hospital Follow-up Visit:    Hospital/Nursing Home/IP Rehab Facility: [facility]  Date of Admission: 03/30/19  Date of Discharge: 03/31/19  Reason(s) for Admission: dehydration            Problems taking medications regularly:  None       Medication changes since discharge: None       Problems adhering to non-medication therapy:  None    Summary of hospitalization:  [facility] discharge summary reviewed  Diagnostic Tests/Treatments reviewed.  Follow up needed: none  Other Healthcare Providers Involved in Patient s Care:         None  Update since discharge: improved.     Post Discharge Medication Reconciliation: discharge medications reconciled and changed, per note/orders (see AVS).  Plan of care communicated with patient and caregiver     Coding guidelines for this visit:  Type of Medical   Decision Making Face-to-Face Visit       within 7 Days of discharge Face-to-Face Visit        within 14 days of discharge   Moderate Complexity 29064 [id number]   High Complexity 12192 [id number]          Problem list and histories reviewed & adjusted, as indicated.  Additional history: as documented    BP Readings from Last 3 Encounters:   04/04/19 144/80   03/24/19 154/63   03/22/19 117/64    Wt Readings from Last 3 Encounters:   04/04/19 85.7 kg (189 lb)   03/24/19 89.6 kg (197 lb 8.5 oz)   02/14/19 88.3 kg (194 lb 9.6 oz)                  Labs reviewed in EPIC    Reviewed and updated as needed this visit by clinical staff  Tobacco  Allergies  Meds  Med Hx  Surg Hx  Fam Hx  Soc Hx      Reviewed and updated as needed this visit by Provider         ROS:  Constitutional, HEENT, cardiovascular, pulmonary, gi and gu systems are negative,  "except as otherwise noted.    OBJECTIVE:     /80 (BP Location: Right arm, Patient Position: Sitting, Cuff Size: Adult Regular)   Pulse 84   Temp 98.6  F (37  C) (Tympanic)   Resp 16   Ht 1.626 m (5' 4\")   Wt 85.7 kg (189 lb)   SpO2 100%   BMI 32.44 kg/m    Body mass index is 32.44 kg/m .  GENERAL: healthy, alert and no distress  RESP: lungs clear to auscultation - no rales, rhonchi or wheezes  CV: regular rates and rhythm, normal S1 S2, no S3 or S4, grade 3/6 systolic murmur, peripheral pulses strong and mild bilateral lower extremity pitting edema to ankles and feet     PSYCH: mentation appears normal, affect normal/bright    Diagnostic Test Results:  Pending     ASSESSMENT/PLAN:     1. Essential hypertension  -uncontrolled  -increased Coreg 12.5 mg AM and 6.25 mg PM  -monitor BP at home twice daily  -follow up if numbers still over 140/90  -continue Lisinopril 5 mg daily, monitor renal function and electrolytes  -drink more fluids  -exercise and work on weight loss  - Basic metabolic panel  - carvedilol (COREG) 6.25 MG tablet; Increase Coreg to 12.5 mg AM and continue 6.25 mg PM  Dispense: 75 tablet; Refill: 1    2. CKD (chronic kidney disease) stage 3, GFR 30-59 ml/min (H)  - Basic metabolic panel    3. Morbid obesity.  -discussed weight loss and exercise again, she will try to walk every day at least 20-30 minutes around her house.    See Patient Instructions    VERONIQUE Spears [facility]  "

## 2019-04-04 NOTE — PATIENT INSTRUCTIONS
Keep a bottle of water all the time and drink more fluids  Repeat kidney function and electrolytes   Increase Coreg to 12.5 mg AM and continue 6.25 mg PM.  Lisinopril 5 mg daily  Monitor BP twice daily     Exercise and try to loose some weight, 1-2 lbs per month at least

## 2019-04-15 ENCOUNTER — OFFICE VISIT (OUTPATIENT)
Dept: OTOLARYNGOLOGY | Facility: CLINIC | Age: 34
End: 2019-04-15
Payer: MEDICARE

## 2019-04-15 VITALS
HEART RATE: 72 BPM | WEIGHT: 190 LBS | SYSTOLIC BLOOD PRESSURE: 126 MMHG | DIASTOLIC BLOOD PRESSURE: 71 MMHG | BODY MASS INDEX: 32.61 KG/M2 | TEMPERATURE: 98.4 F

## 2019-04-15 DIAGNOSIS — H66.3X3 CHRONIC SUPPURATIVE OTITIS MEDIA OF BOTH EARS, UNSPECIFIED OTITIS MEDIA LOCATION: Primary | ICD-10-CM

## 2019-04-15 DIAGNOSIS — N18.30 CKD (CHRONIC KIDNEY DISEASE) STAGE 3, GFR 30-59 ML/MIN (H): ICD-10-CM

## 2019-04-15 LAB
BASOPHILS # BLD AUTO: 0.1 10E9/L (ref 0–0.2)
BASOPHILS NFR BLD AUTO: 0.8 %
DIFFERENTIAL METHOD BLD: ABNORMAL
EOSINOPHIL # BLD AUTO: 0.2 10E9/L (ref 0–0.7)
EOSINOPHIL NFR BLD AUTO: 1.3 %
ERYTHROCYTE [DISTWIDTH] IN BLOOD BY AUTOMATED COUNT: 13.8 % (ref 10–15)
HCT VFR BLD AUTO: 42 % (ref 35–47)
HGB BLD-MCNC: 12.9 G/DL (ref 11.7–15.7)
IMM GRANULOCYTES # BLD: 0.2 10E9/L (ref 0–0.4)
IMM GRANULOCYTES NFR BLD: 1.2 %
LYMPHOCYTES # BLD AUTO: 4.9 10E9/L (ref 0.8–5.3)
LYMPHOCYTES NFR BLD AUTO: 30.6 %
MCH RBC QN AUTO: 29.9 PG (ref 26.5–33)
MCHC RBC AUTO-ENTMCNC: 30.7 G/DL (ref 31.5–36.5)
MCV RBC AUTO: 97 FL (ref 78–100)
MONOCYTES # BLD AUTO: 1.3 10E9/L (ref 0–1.3)
MONOCYTES NFR BLD AUTO: 8.3 %
NEUTROPHILS # BLD AUTO: 9.2 10E9/L (ref 1.6–8.3)
NEUTROPHILS NFR BLD AUTO: 57.8 %
NRBC # BLD AUTO: 0 10*3/UL
NRBC BLD AUTO-RTO: 0 /100
PLATELET # BLD AUTO: 417 10E9/L (ref 150–450)
RBC # BLD AUTO: 4.31 10E12/L (ref 3.8–5.2)
WBC # BLD AUTO: 15.8 10E9/L (ref 4–11)

## 2019-04-15 PROCEDURE — 85025 COMPLETE CBC W/AUTO DIFF WBC: CPT | Performed by: CLINICAL NURSE SPECIALIST

## 2019-04-15 PROCEDURE — 36415 COLL VENOUS BLD VENIPUNCTURE: CPT | Performed by: CLINICAL NURSE SPECIALIST

## 2019-04-15 PROCEDURE — 99213 OFFICE O/P EST LOW 20 MIN: CPT | Performed by: OTOLARYNGOLOGY

## 2019-04-15 RX ORDER — CIPROFLOXACIN AND DEXAMETHASONE 3; 1 MG/ML; MG/ML
4 SUSPENSION/ DROPS AURICULAR (OTIC) 2 TIMES DAILY
Qty: 7.5 ML | Refills: 0 | Status: SHIPPED | OUTPATIENT
Start: 2019-04-15 | End: 2019-05-14

## 2019-04-15 ASSESSMENT — PAIN SCALES - GENERAL: PAINLEVEL: NO PAIN (0)

## 2019-04-15 NOTE — NURSING NOTE
"Initial /71 (BP Location: Right arm, Patient Position: Chair, Cuff Size: Adult Regular)   Pulse 72   Temp 98.4  F (36.9  C) (Oral)   Wt 86.2 kg (190 lb)   BMI 32.61 kg/m   Estimated body mass index is 32.61 kg/m  as calculated from the following:    Height as of 4/4/19: 1.626 m (5' 4\").    Weight as of this encounter: 86.2 kg (190 lb). .    June Burroughs LPN    "

## 2019-04-15 NOTE — PROGRESS NOTES
History of Present Illness - Divina Delgadillo is a 34 year old female I have been following for right otorrhea and a long history of Eustachian tube dysfunction. She had PE tubes in childhood, but denies other surgery on the ears.   In Fall 2018, she presented with right otorrhea. I treated with steroid drops and then obtained a CT Temporal bone. The drops helped reduce the otorrhea, and on CT she had some potential soft tissue in Prussak's space on the right, but no clear sign of cholesteatoma.  At that time she was advised to engage strict dry ear precautions, but she has not yet purchased ear plugs. She presents today for f/u, but denies much trouble with ear pain or drainage from the ears.    Past Medical History -   Patient Active Problem List   Diagnosis     Esophageal reflux     NAFL (nonalcoholic fatty liver)     Essential hypertension     Hyperlipidemia LDL goal <100     DVT (deep venous thrombosis) (H)     CKD (chronic kidney disease) stage 3, GFR 30-59 ml/min (H)     Malignant neoplasm of pancreas, unspecified location of malignancy (H)     Type 2 diabetes mellitus with stage 3 chronic kidney disease, with long-term current use of insulin (H)     Bipolar disorder (H)     Cervical high risk HPV (human papillomavirus) test positive     IUD (intrauterine device) in place     Morbid obesity (H)     Mild intermittent asthma with acute exacerbation     Nicotine abuse     H/O leukocytosis     Acute-on-chronic kidney injury (H)     Near syncope     Leukocytosis     Acquired asplenia     Acute pain of left knee     ARF (acute renal failure) (H)       Current Medications -   Current Outpatient Medications:      amLODIPine (NORVASC) 5 MG tablet, Take 2 tablets (10 mg) by mouth daily, Disp: 90 tablet, Rfl: 1     ARIPiprazole (ABILIFY) 30 MG tablet, Take 30 mg by mouth daily, Disp: , Rfl:      blood glucose monitoring (NO BRAND SPECIFIED) test strip, 1 strip by In Vitro route 3 times daily, Disp: 100 each, Rfl: 11      blood glucose monitoring (SOFTCLIX) lancets, Use to test blood sugar 3 times daily, Disp: 100 each, Rfl: 11     Blood Glucose Monitoring Suppl (ACCU-CHEK GUIDE) w/Device KIT, 1 Device daily, Disp: 1 kit, Rfl: 0     carvedilol (COREG) 6.25 MG tablet, Increase Coreg to 12.5 mg AM and continue 6.25 mg PM, Disp: 75 tablet, Rfl: 1     ciprofloxacin-dexamethasone (CIPRODEX) 0.3-0.1 % otic suspension, Place 4 drops into both ears 2 times daily for 10 days, Disp: 7.5 mL, Rfl: 0     Clozapine (CLOZARIL) 200 MG tablet, Take 200 mg by mouth 2 times daily, Disp: , Rfl:      FLUoxetine (PROZAC) 20 MG capsule, Take 20 mg by mouth daily, Disp: , Rfl:      insulin glargine (LANTUS SOLOSTAR PEN) 100 UNIT/ML pen, Inject 25 units once daily   (allow max dose 35 units/day for titration), Disp: 15 mL, Rfl: 2     lamoTRIgine (LAMICTAL) 25 MG tablet, 100 mg at bedtime, Disp: , Rfl: 0     lisinopril (PRINIVIL/ZESTRIL) 10 MG tablet, Take 0.5 tablets (5 mg) by mouth daily, Disp: 90 tablet, Rfl: 3     loratadine (CLARITIN) 10 MG tablet, Take 1 tablet (10 mg) by mouth every morning, Disp: 90 tablet, Rfl: 1     montelukast (SINGULAIR) 10 MG tablet, Take 1 tablet (10 mg) by mouth At Bedtime, Disp: 90 tablet, Rfl: 1     omeprazole (PRILOSEC) 20 MG CR capsule, Take 1 capsule (20 mg) by mouth daily, Disp: 60 capsule, Rfl: 5     ranitidine (ZANTAC) 300 MG tablet, Take 1 tablet (300 mg) by mouth At Bedtime, Disp: 90 tablet, Rfl: 1     semaglutide (OZEMPIC) 1 MG/DOSE pen, Inject 1 mg Subcutaneous every 7 days (Patient taking differently: Inject 1 mg Subcutaneous every 7 days On thursdays), Disp: 3 mL, Rfl: 3     ULTICARE MINI 31G X 6 MM insulin pen needle, Use 1 pen needles daily, Disp: 100 each, Rfl: 11     ULTILET SHARPS CONTAINER 1QT MISC, 1 Device every 30 days, Disp: 1 each, Rfl: 11     albuterol (PROAIR HFA/PROVENTIL HFA/VENTOLIN HFA) 108 (90 BASE) MCG/ACT Inhaler, Inhale 2 puffs into the lungs every 6 hours as needed for shortness of breath /  dyspnea or wheezing (Patient not taking: Reported on 4/15/2019), Disp: 1 Inhaler, Rfl: 3    Allergies -   Allergies   Allergen Reactions     Acetaminophen Other (See Comments)     pt previously tried to overdose on it, PMD does not want pt taking per pt.      Latex Rash       Social History -   Social History     Social History     Marital status: Single     Spouse name: N/A     Number of children: 0     Years of education: N/A     Social History Main Topics     Smoking status: Current Every Day Smoker     Packs/day: 0.50     Years: 4.00     Types: Cigarettes     Smokeless tobacco: Never Used      Comment: has information about quitplan     Alcohol use No     Drug use: No     Sexual activity: Yes     Partners: Female      Comment: Both male and female      Other Topics Concern     Parent/Sibling W/ Cabg, Mi Or Angioplasty Before 65f 55m? No     Social History Narrative       Family History -   Family History   Problem Relation Age of Onset     Respiratory Mother         ASTHMA     Allergies Mother      Depression Mother      Heart Disease Father         HEART VALVE REPLACEMENT     Hypertension Father      Diabetes Father      Depression Father      Respiratory Brother      Allergies Brother      Respiratory Sister      Allergies Sister      Depression Sister      Respiratory Sister      Allergies Sister      Depression Sister      Kidney Disease No family hx of        Review of Systems - As per HPI and PMHx, otherwise 7 system review of the head and neck negative. 10+ system review negative.    Exam:  /71 (BP Location: Right arm, Patient Position: Chair, Cuff Size: Adult Regular)   Pulse 72   Temp 98.4  F (36.9  C) (Oral)   Wt 86.2 kg (190 lb)   BMI 32.61 kg/m    General - The patient is well nourished and well developed, and appears to have good nutritional status.  Alert and oriented to person and place, answers questions and cooperates with examination appropriately.   Head and Face - Normocephalic and  atraumatic, with no gross asymmetry noted of the contour of the facial features.  The facial nerve is intact, with strong symmetric movements.  Eyes - Extraocular movements intact.  Sclera were not icteric or injected, conjunctiva were pink and moist.  Ears - right TM with inflammed polypoid change on the lateral surface of the inferior half of the TM. No visible perforation, but the TM is retracted. There is overlying moist green otorrhea on the inflammed portion of tympanic membrane.  On the left, the tympanic membrane has a deep retraction of the posterior half of the TM. There is adherent thick dried otorrhea posteriorly which was removed. No active otorrhea. She is unable to autoinsufflate either ear.    CT Temporal bone 11/26/18 directly visualized. Evidence of bilateral chronic Eustachian tube dysfunction. Right ear with a high possibly dehiscent jugular bulb nearing the inferior insertion of the tympanic membrane. Left ear with a very low tegmen abuting the bony ear canal.    IMPRESSION:   1. Small amount of soft tissue in the region of the right Prussak's  space. Retracted right tympanic membrane. Questionable erosion of the  scutum. These findings are suspicious for a cholesteatoma. Multiple  right mastoid air cells are opacified.  2. Severely retracted left tympanic membrane. No definite middle ear  mass is identified on these images. The left mastoid is poorly  aerated.    A/P - Divinapeter Delgadillo is a 34 year old female with chronic Eustachian tube dysfunction and resultant anatomic abnormalities of the middle ear. She has recurrence of chronic otorrhea, especially on the right, since her last exam 5 months ago. This may be due to water exposure as she has not been vigilant with dry ear precautions.    I recommended a course of Ciprodex to try to again calm down the inflamed surface of the right TM. I also placed a referral to the otology team at the [facility] to consider if tympanoplasty may be necessary to  eradicate the chronically inflamed appearance of the right tympanic membrane.      Dr. Meena Pelaez MD  Otolaryngology  [facility]

## 2019-04-22 ENCOUNTER — ALLIED HEALTH/NURSE VISIT (OUTPATIENT)
Dept: EDUCATION SERVICES | Facility: CLINIC | Age: 34
End: 2019-04-22
Payer: MEDICARE

## 2019-04-22 VITALS — BODY MASS INDEX: 33.44 KG/M2 | WEIGHT: 194.8 LBS

## 2019-04-22 DIAGNOSIS — Z79.4 TYPE 2 DIABETES MELLITUS WITH STAGE 3 CHRONIC KIDNEY DISEASE, WITH LONG-TERM CURRENT USE OF INSULIN (H): Primary | ICD-10-CM

## 2019-04-22 DIAGNOSIS — E11.22 TYPE 2 DIABETES MELLITUS WITH STAGE 3 CHRONIC KIDNEY DISEASE, WITH LONG-TERM CURRENT USE OF INSULIN (H): Primary | ICD-10-CM

## 2019-04-22 DIAGNOSIS — N18.30 TYPE 2 DIABETES MELLITUS WITH STAGE 3 CHRONIC KIDNEY DISEASE, WITH LONG-TERM CURRENT USE OF INSULIN (H): Primary | ICD-10-CM

## 2019-04-22 PROCEDURE — G0108 DIAB MANAGE TRN  PER INDIV: HCPCS

## 2019-04-22 NOTE — PROGRESS NOTES
"Diabetes Self-Management Education & Support    Diabetes Education Self Management & Training    SUBJECTIVE/OBJECTIVE:  Presents for: Follow-up  Accompanied by: Self, Other  Diabetes education in the past 24mo: Yes  Focus of Visit: Monitoring, Taking Medication  Diabetes type: Type 2  Disease course: Improving  Transportation concerns: Yes  Other concerns:: None  Cultural Influences/Ethnic Background:  American    Diabetes Symptoms & Complications  Fatigue: No  Symptom course: Resolved  Weight trend: Stable       Patient Problem List and Family Medical History reviewed for relevant medical history, current medical status, and diabetes risk factors.    Vitals:  Wt 88.4 kg (194 lb 12.8 oz)   BMI 33.44 kg/m    Estimated body mass index is 33.44 kg/m  as calculated from the following:    Height as of 4/4/19: 1.626 m (5' 4\").    Weight as of this encounter: 88.4 kg (194 lb 12.8 oz).   Last 3 BP:   BP Readings from Last 3 Encounters:   04/15/19 126/71   04/04/19 144/80   03/24/19 154/63       History   Smoking Status     Current Every Day Smoker     Packs/day: 0.75     Years: 4.00     Types: Cigarettes   Smokeless Tobacco     Never Used     Comment: has information about quitplan       Labs:  Lab Results   Component Value Date    A1C 8.2 03/22/2019     Lab Results   Component Value Date     04/04/2019     Lab Results   Component Value Date     06/21/2018     HDL Cholesterol   Date Value Ref Range Status   06/21/2018 57 >49 mg/dL Final   ]  GFR Estimate   Date Value Ref Range Status   04/04/2019 58 (L) >60 mL/min/[1.73_m2] Final     Comment:     Non  GFR Calc  Starting 12/18/2018, serum creatinine based estimated GFR (eGFR) will be   calculated using the Chronic Kidney Disease Epidemiology Collaboration   (CKD-EPI) equation.       GFR Estimate If Black   Date Value Ref Range Status   04/04/2019 67 >60 mL/min/[1.73_m2] Final     Comment:      GFR Calc  Starting 12/18/2018, " serum creatinine based estimated GFR (eGFR) will be   calculated using the Chronic Kidney Disease Epidemiology Collaboration   (CKD-EPI) equation.       Lab Results   Component Value Date    CR 1.23 04/04/2019     No results found for: MICROALBUMIN    Healthy Eating  Healthy Eating Assessed Today: No  Meal planning: Smaller portions(Varies between portion control and avoiding sweets to no meal planning)  Meals include: Breakfast, Lunch, Dinner, Snacks  Beverages: Water, Diet soda, Sports drinks(had orange juice again on the day of the 367)  Has patient met with a dietitian in the past?: Yes    Being Active  Being Active Assessed Today: Yes(More motivated about [employer]; wants to set goal of biking there this spring when the weather improves)  Exercise:: Currently not exercising  Barrier to exercise: Access(needs a ride to the gym)    Monitoring  Monitoring Assessed Today: Yes  Did patient bring glucose meter to appointment? : Yes  Blood Glucose Meter: Accu-check  Home Glucose (Sugar) Monitoring: 3-4 times per day  Low Glucose Range (mg/dL):   High Glucose Range (mg/dL): >200  Overall Range (mg/dL): 140-180        Taking Medications  Diabetes Medication(s)     Insulin       insulin glargine (LANTUS SOLOSTAR PEN) 100 UNIT/ML pen    Inject 25 units once daily   (allow max dose 35 units/day for titration)    Incretin Mimetic Agents (GLP-1 Receptor Agonists)       semaglutide (OZEMPIC) 1 MG/DOSE pen    Inject 1 mg Subcutaneous every 7 days     Patient taking differently:  Inject 1 mg Subcutaneous every 7 days On thursdays      Lantus 0-0-0-24    Taking Medication Assessed Today: Yes  Current Treatments: Insulin Injections, Non-insulin Injectables  Problems taking diabetes medications regularly?: No  Diabetes medication side effects?: No  Treatment Compliance: All of the time    Problem Solving  Problem Solving Assessed Today: Yes  Hypoglycemia Frequency: Rarely(not recently)  Patient carries a carbohydrate  source: Yes    Hypoglycemia symptoms  Tremors: Yes    Reducing Risks  Reducing Risks Assessed Today: No    Healthy Coping  Healthy Coping Assessed Today: Yes  Emotional response to diabetes: Ready to learn  Informal Support system:: Friends, Family, Other  Stage of change: ACTION (Actively working towards change)(varies between preparation and action)  Support resources: Websites, In-person Offerings  Patient Activation Measure Survey Score:  EMY Score (Last Two) 5/10/2018   EMY Raw Score 35   Activation Score 72.1   EMY Level 3     Patient's most recent   Lab Results   Component Value Date    A1C 8.2 03/22/2019    is not meeting goal of <7.0    ASSESSMENT/INTERVENTION:   Patient wanting to adjust diet & activity, but is having a hard time following through.  Health coaching visit tomorrow.  Reviewed goals and motivations.  Patient continues on the 0.5mg dose of Ozempic.  1mg dose was sent in on 2/22/19. Called pharmacy who had both doses on record, unsure why 0.5mg was filled. Patient to start the 1mg dose this Thursday.  Recommend decreasing Lantus from 0-0-0-24 to 0-0-0-19  (21% decrease) with increasing the GLP1.       Diabetes knowledge and skills assessment:   Patient is knowledgeable in diabetes management concepts related to: Healthy Eating, Being Active, Monitoring, Taking Medication, Problem Solving, Reducing Risks and Healthy Coping  Patient needs further education on the following diabetes management concepts: Healthy Eating and Being Active  Based on learning assessment above, most appropriate setting for further diabetes education would be: Individual setting.    Education provided today on:  AADE Self-Care Behaviors:  Being Active: relationship to blood glucose  Taking Medication: action of prescribed medication, side effects of prescribed medications and when to take medications  Risk reduction: reviewed tobacco cessation     Opportunities for ongoing education and support in diabetes-self management  were discussed.    Pt verbalized understanding of concepts discussed and recommendations provided today.       Education Materials Provided:  No new materials provided today    PLAN:  See Patient Instructions for co-developed, patient-stated behavior change goals.  AVS printed and provided to patient today. See Follow-Up section for recommended follow-up.    Dolly Riley RD, LD, CDE  Diabetes Education    Time Spent: 30 minutes  Encounter Type: Individual    Any diabetes medication dose changes were made via the CDE Protocol and Collaborative Practice Agreement with the patient's referring provider.

## 2019-04-22 NOTE — PATIENT INSTRUCTIONS
Decrease the Lantus from 24 units down to 19 units.     Sneaking activity in - adding extra stairs     New computer  - search for exercise videos     [email or web address]      Dolly Riley RD, DANIEL, CDE  For Diabetes Education appointments call: [phone]   For Diabetes Education Related Questions call: [phone] or email: [email or web address]

## 2019-04-23 ENCOUNTER — PATIENT OUTREACH (OUTPATIENT)
Dept: NURSING | Facility: CLINIC | Age: 34
End: 2019-04-23

## 2019-04-23 DIAGNOSIS — Z79.4 TYPE 2 DIABETES MELLITUS WITH STAGE 3 CHRONIC KIDNEY DISEASE, WITH LONG-TERM CURRENT USE OF INSULIN (H): Primary | ICD-10-CM

## 2019-04-23 DIAGNOSIS — E11.22 TYPE 2 DIABETES MELLITUS WITH STAGE 3 CHRONIC KIDNEY DISEASE, WITH LONG-TERM CURRENT USE OF INSULIN (H): Primary | ICD-10-CM

## 2019-04-23 DIAGNOSIS — N18.30 TYPE 2 DIABETES MELLITUS WITH STAGE 3 CHRONIC KIDNEY DISEASE, WITH LONG-TERM CURRENT USE OF INSULIN (H): Primary | ICD-10-CM

## 2019-04-23 PROCEDURE — 99207 ZZC HEALTH COACHING, NO CHARGE: CPT | Performed by: FAMILY MEDICINE

## 2019-04-23 NOTE — PROGRESS NOTES
"April 23, 2019    Health Coaching Progress Note    Patient Name: Divina Delgadillo Date: April 23, 2019      Session Length: 15      DATA    PRM Master Survey Scores Reviewed: Yes    Core Healthy Days Survey:         EMY Score (Last Two) 5/10/2018   EMY Raw Score 35   Activation Score 72.1   EMY Level 3       PHQ-2 Score 4/4/2019 2/14/2019   PHQ-2 Total Score (Adult) - Positive if 3 or more points; Administer PHQ-9 if positive 0 1       PHQ-9 SCORE 11/27/2018 2/14/2019   PHQ-9 Total Score 16 12       Treatment Objective(s) Addressed in This Session:  Target Behavior(s): smoking and disease management/lifestyle changes increasing exercise    Current Stressors / Issues:  Divina notes no stressors or issues today.    What Patient Does Well:   Divina has been doing better with managing her diabetes again and continues to follow up with her CDE.   Previous Successes:   1) Divina is doing better at saying \"no\" to tempting high carb foods  2)Divina has decided to begin working to decrease her smoking and has started to use an E-cig instead  Areas in Need of Improvement:   Divina says the area in need of improvement at this is beginning to work on smoking cessation. Writer agrees with this plan.  Barriers to Change:   1) Has been smoking for years and has developed an addiction and habits  Reasons for Change:   1) Divina wants to makes changes for her health and self-esteem   2) Financial reasons  Plan/Goal for the Next 4 Weeks:   Goals Addressed as of 4/23/2019 at 3:51 PM                 Today    4/22/19      Being Active (pt-stated)   0%  0%    Added 2/13/19 by Mahin Penny     My Goal: I will continue exercising most days of the week-targeting 3 times (minimum)-and begin incorporating strength training    What I need to meet my goal: Divina plans to discuss with her aunt who is a -may try reaching out again as she got no response    I plan to meet my goal by this date: Next Coaching Session        Reducing Risks " (pt-stated)   50%  50%    Added 12/5/18 by Mahin Penny     My Goal: I will begin using an E-cig and continue thinking about complete tobacco cessation    What I need to meet my goal: nothing needed at this time    I plan to meet my goal by this date: Next Coaching Session            Intervention:  Motivational Interviewing    MI Intervention: Expressed Empathy/Understanding, Supported Autonomy, Collaboration, Evocation, Permission to raise concern or advise, Open-ended questions, Reflections: simple and complex, Change talk (evoked) and Reframe     Change Talk Expressed by the Patient: Desire to change Reasons to change Committment to change Activation Taking steps    Provider Response to Change Talk: E - Evoked more info from patient about behavior change, A - Affirmed patient's thoughts, decisions, or attempts at behavior change, R - Reflected patient's change talk and S - Summarized patient's change talk statements    Assessment / Progress on Treatment Objective(s) / Homework:    Satisfactory progress - PREPARATION (Decided to change - considering how); Intervened by negotiating a change plan and determining options / strategies for behavior change, identifying triggers, exploring social supports, and working towards setting a date to begin behavior change     Plan: (Homework, other):  Patient was encouraged to continue to seek condition-related information and education, as well as schedule a follow up appointment with the Health  in 4 weeks. Patient has set self-identified goals and will monitor progress until the next appointment.  Next visit scheduled for May 21 at 3:30PM.      Mahin Penny, Health   4/23/2019    3:51 PM

## 2019-04-24 NOTE — TELEPHONE ENCOUNTER
FUTURE VISIT INFORMATION      FUTURE VISIT INFORMATION:    Date: 5/16/19    Time: 10:30 am ENT  9:30 am Audiology    Location: [facility]  REFERRAL INFORMATION:    Referring provider:  Dr. Meena Pelaez    Referring providers clinic:  [facility] Otolaryngology    Reason for visit/diagnosis  Chronic Supprative Otitis media both ears    RECORDS REQUESTED FROM:       Clinic name Comments Records Status Imaging Status   [facility] Otolaryngology Office Visit-4/15/19, 12/3/18, 11/14/18- Dr. Pelaez [facility] Audiogram-11/14/18-[facility] Radiology CT temporal Bones-11/26/18  CT Maxillofacial-4/23/11 Epic PACS

## 2019-05-03 DIAGNOSIS — I10 ESSENTIAL HYPERTENSION: ICD-10-CM

## 2019-05-03 RX ORDER — CARVEDILOL 6.25 MG/1
TABLET ORAL
Qty: 90 TABLET | Refills: 2 | Status: SHIPPED | OUTPATIENT
Start: 2019-05-03 | End: 2019-12-31

## 2019-05-03 NOTE — TELEPHONE ENCOUNTER
"Requested Prescriptions   Pending Prescriptions Disp Refills     carvedilol (COREG) 6.25 MG tablet 75 tablet 1     Sig: Increase Coreg to 12.5 mg AM and continue 6.25 mg PM       Beta-Blockers Protocol Passed - 5/3/2019  8:16 AM        Passed - Blood pressure under 140/90 in past 12 months     BP Readings from Last 3 Encounters:   04/15/19 126/71   04/04/19 144/80   03/24/19 154/63                 Passed - Patient is age 6 or older        Passed - Recent (12 mo) or future (30 days) visit within the authorizing provider's specialty     Patient had office visit in the last 12 months or has a visit in the next 30 days with authorizing provider or within the authorizing provider's specialty.  See \"Patient Info\" tab in inbasket, or \"Choose Columns\" in Meds & Orders section of the refill encounter.              Passed - Medication is active on med list        Last Written Prescription Date:  4/4/19  Last Fill Quantity: 75,  # refills: 1   Last office visit: 4/4/2019 with prescribing provider:  Eva   Future Office Visit:   Next 5 appointments (look out 90 days)    Jul 16, 2019 10:30 AM CDT  Return Visit with Sánchez Dias MD  [facility] [address] [place] [id number] [phone]           "

## 2019-05-03 NOTE — TELEPHONE ENCOUNTER
Prescription approved per [facility] [place].    4/4/19 OV: Increase Coreg to 12.5 mg AM and continue 6.25 mg PM.  Outpatient Medication Detail      Disp Refills Start End [facility]   carvedilol (COREG) 6.25 MG tablet 75 tablet 1 4/4/2019  No   Sig: Increase Coreg to 12.5 mg AM and continue 6.25 mg PM   Sent to pharmacy as: carvedilol (COREG) 6.25 MG tablet   Class: E-Prescribe   Order: [id number]   E-Prescribing Status: Receipt confirmed by pharmacy (4/4/2019  9:36 AM CDT)   Printout Tracking     External Result Report   Medication Administration Instructions     Increase Coreg to 12.5 mg AM and continue 6.25 mg PM   Pharmacy     [facility] - - [place] - [id number] [facility]   Associated Diagnoses     Essential hypertension [I10]  - Primary         BP Readings from Last 6 Encounters:   04/15/19 126/71   04/04/19 144/80   03/24/19 154/63   03/22/19 117/64   02/14/19 122/68   01/31/19 139/79     Ramona CHEN RN

## 2019-05-13 DIAGNOSIS — H66.3X3 CHRONIC SUPPURATIVE OTITIS MEDIA OF BOTH EARS, UNSPECIFIED OTITIS MEDIA LOCATION: ICD-10-CM

## 2019-05-13 DIAGNOSIS — N18.30 TYPE 2 DIABETES MELLITUS WITH STAGE 3 CHRONIC KIDNEY DISEASE, WITH LONG-TERM CURRENT USE OF INSULIN (H): ICD-10-CM

## 2019-05-13 DIAGNOSIS — Z79.4 TYPE 2 DIABETES MELLITUS WITH STAGE 3 CHRONIC KIDNEY DISEASE, WITH LONG-TERM CURRENT USE OF INSULIN (H): ICD-10-CM

## 2019-05-13 DIAGNOSIS — E11.22 TYPE 2 DIABETES MELLITUS WITH STAGE 3 CHRONIC KIDNEY DISEASE, WITH LONG-TERM CURRENT USE OF INSULIN (H): ICD-10-CM

## 2019-05-13 NOTE — TELEPHONE ENCOUNTER
"Requested Prescriptions   Pending Prescriptions Disp Refills     blood glucose (NO BRAND SPECIFIED) test strip 100 each 11     Si strip by In Vitro route 3 times daily   Last Written Prescription Date:  18  Last Fill Quantity: 100 each,  # refills: 11   Last office visit: 2019 with prescribing provider:  Jaleesa Velasquez     Future Office Visit:   Next 5 appointments (look out 90 days)    May 21, 2019  3:30 PM CDT  Telephone Visit with Mahin Penny  [facility] ([address] [address]  [phone]   2019 10:30 AM CDT  [facility] Visit with Sánchez [facility]as [facility]  [facility] [address] [place] [id number]  [phone]             Diabetic Supplies Protocol Passed - 2019  4:22 PM        Passed - Medication is active on med list        Passed - Patient is 18 years of age or older        Passed - Recent (6 mo) or future (30 days) visit within the authorizing provider's specialty     Patient had office visit in the last 6 months or has a visit in the next 30 days with authorizing provider.  See \"Patient Info\" tab in inbasket, or \"Choose Columns\" in Meds & Orders section of the refill encounter.              "

## 2019-05-13 NOTE — TELEPHONE ENCOUNTER
LOV 4/2019. Medication not on nurse protocol. Sent to provider for review.  Macey Hannah RN BSN PHN

## 2019-05-14 RX ORDER — CIPROFLOXACIN AND DEXAMETHASONE 3; 1 MG/ML; MG/ML
4 SUSPENSION/ DROPS AURICULAR (OTIC) 2 TIMES DAILY
Qty: 7.5 ML | Refills: 0 | Status: SHIPPED | OUTPATIENT
Start: 2019-05-14 | End: 2019-07-03

## 2019-05-16 ENCOUNTER — PRE VISIT (OUTPATIENT)
Dept: OTOLARYNGOLOGY | Facility: CLINIC | Age: 34
End: 2019-05-16

## 2019-05-21 ENCOUNTER — PATIENT OUTREACH (OUTPATIENT)
Dept: NURSING | Facility: CLINIC | Age: 34
End: 2019-05-21

## 2019-05-21 DIAGNOSIS — Z79.4 TYPE 2 DIABETES MELLITUS WITH STAGE 3 CHRONIC KIDNEY DISEASE, WITH LONG-TERM CURRENT USE OF INSULIN (H): Primary | ICD-10-CM

## 2019-05-21 DIAGNOSIS — E11.22 TYPE 2 DIABETES MELLITUS WITH STAGE 3 CHRONIC KIDNEY DISEASE, WITH LONG-TERM CURRENT USE OF INSULIN (H): Primary | ICD-10-CM

## 2019-05-21 DIAGNOSIS — N18.30 TYPE 2 DIABETES MELLITUS WITH STAGE 3 CHRONIC KIDNEY DISEASE, WITH LONG-TERM CURRENT USE OF INSULIN (H): Primary | ICD-10-CM

## 2019-05-21 PROCEDURE — 99207 ZZC HEALTH COACHING, NO CHARGE: CPT

## 2019-05-21 NOTE — PROGRESS NOTES
May 21, 2019    Health Coaching Progress Note    Patient Name: Divina Delgadillo Date: May 21, 2019      Session Length: 25      DATA    PRM Master Survey Scores Reviewed: Yes    Core Healthy Days Survey:         EMY Score (Last Two) 5/10/2018   EMY Raw Score 35   Activation Score 72.1   EMY Level 3       PHQ-2 Score 4/4/2019 2/14/2019   PHQ-2 Total Score (Adult) - Positive if 3 or more points; Administer PHQ-9 if positive 0 1       PHQ-9 SCORE 11/27/2018 2/14/2019   PHQ-9 Total Score 16 12       Treatment Objective(s) Addressed in This Session:  Target Behavior(s): smoking and disease management/lifestyle changes increasing exercise    Current Stressors / Issues:  Divina shares today that she thinks she would like to begin trying to work toward quitting smoking.    What Patient Does Well:   Divina has been working on exercising more consistently and feels she does well for a short time and then not. We discuss this further today during our visit.  Previous Successes:   Divina has quit smoking in the past using NRT patches for support.  Areas in Need of Improvement:   Divina says the area in need of improvement at this time is cutting back on her cigarette smoking. Divina says she currently smokes a pack per day. We talk about different strategies for cutting back during our visit today. Divina lists all of the typical times she smokes and writer makes a list of all the times Divina can think of. Divina comes up with 12 times: wake-up (w/in 30 minutes), get to work, get back to jobsite, before lunch, after lunch, 1:30 break, gets home, 1-2 in the afternoon before dinner, 1 after dinner (5PM), then at 7PM, last one at 9PM before bed. Divina thinks she can stick to smoking at just these times and not feel too deprived to start. We will follow up on this next visit and go from there depending on how it goes for Divina. Divina plans to use hard candy or walks outside to cope with cravings/stress as alternatives.  Barriers to  Change:   1) Limited support for quitting currently (Divina shares that those close to her tell her they don't think she is ready/able to quit smoking at this time).  Reasons for Change:   Divina wants to quit smoking for her health and self-esteem.  Plan/Goal for the Next 4 Weeks:   Goals Addressed as of 5/21/2019 at 3:57 PM                 Today    4/23/19      Being Active (pt-stated)   10%  0%    Added 2/13/19 by Mahin Penny     My Goal: I will continue exercising most days of the week-targeting 3 times (minimum)-and begin incorporating strength training    What I need to meet my goal: Divina plans to discuss with her aunt who is a -may try reaching out again as she got no response, going some, not as often as planned    I plan to meet my goal by this date: Next Coaching Session        Reducing Risks (pt-stated)     50%    Added 12/5/18 by Mahin Penny     My Goal: I will begin cutting back on cigarette smoking (1ppd--->12 per day) and continue working toward complete tobacco cessation    What I need to meet my goal: nothing needed at this time    I plan to meet my goal by this date: Next Coaching Session            Intervention:  Motivational Interviewing    MI Intervention: Expressed Empathy/Understanding, Supported Autonomy, Collaboration, Evocation, Permission to raise concern or advise, Open-ended questions, Reflections: simple and complex, Change talk (evoked) and Reframe     Change Talk Expressed by the Patient: Desire to change Ability to change Reasons to change Committment to change Activation    Provider Response to Change Talk: E - Evoked more info from patient about behavior change, A - Affirmed patient's thoughts, decisions, or attempts at behavior change, R - Reflected patient's change talk and S - Summarized patient's change talk statements    Assessment / Progress on Treatment Objective(s) / Homework:    Satisfactory progress - ACTION (Actively working towards change); Intervened by  reinforcing change plan / affirming steps taken     Plan: (Homework, other):  Patient was encouraged to continue to seek condition-related information and education, as well as schedule a follow up appointment with the Health  in 2 weeks. Patient has set self-identified goals and will monitor progress until the next appointment.  Next visit scheduled for June 11 at 3:30PM.      Mahin Penny Health   5/24/2019    9:39 AM

## 2019-05-28 ENCOUNTER — ALLIED HEALTH/NURSE VISIT (OUTPATIENT)
Dept: EDUCATION SERVICES | Facility: CLINIC | Age: 34
End: 2019-05-28
Payer: MEDICARE

## 2019-05-28 DIAGNOSIS — Z79.4 TYPE 2 DIABETES MELLITUS WITH STAGE 3 CHRONIC KIDNEY DISEASE, WITH LONG-TERM CURRENT USE OF INSULIN (H): Primary | ICD-10-CM

## 2019-05-28 DIAGNOSIS — N18.30 TYPE 2 DIABETES MELLITUS WITH STAGE 3 CHRONIC KIDNEY DISEASE, WITHOUT LONG-TERM CURRENT USE OF INSULIN (H): ICD-10-CM

## 2019-05-28 DIAGNOSIS — N18.30 TYPE 2 DIABETES MELLITUS WITH STAGE 3 CHRONIC KIDNEY DISEASE, WITH LONG-TERM CURRENT USE OF INSULIN (H): Primary | ICD-10-CM

## 2019-05-28 DIAGNOSIS — E11.22 TYPE 2 DIABETES MELLITUS WITH STAGE 3 CHRONIC KIDNEY DISEASE, WITHOUT LONG-TERM CURRENT USE OF INSULIN (H): ICD-10-CM

## 2019-05-28 DIAGNOSIS — E11.22 TYPE 2 DIABETES MELLITUS WITH STAGE 3 CHRONIC KIDNEY DISEASE, WITH LONG-TERM CURRENT USE OF INSULIN (H): Primary | ICD-10-CM

## 2019-05-28 PROCEDURE — G0108 DIAB MANAGE TRN  PER INDIV: HCPCS

## 2019-05-28 PROCEDURE — 95250 CONT GLUC MNTR PHYS/QHP EQP: CPT

## 2019-05-28 NOTE — PROGRESS NOTES
Diabetes Self-Management Education & Support      Diabetes Self-Management Education & Support - Professional CGM Insertion    SUBJECTIVE/OBJECTIVE  Presents for: Follow-up  Accompanied by: Self, Other  Diabetes education in the past 24mo: Yes  Focus of Visit: Monitoring, Taking Medication  Diabetes type: Type 2  Disease course: Stable  Transportation concerns: Yes  Other concerns:: None  Cultural Influences/Ethnic Background:  American      Patient seen today for Professional CGM Insertion:  Professional CGM Insertion  Sensor Type: LibrePro  Lot #: [id number]  Serial #: [id number]  Expiration Date: 09/30/19  Indication(s) for CGM Study: Other       Healthy Eating  Healthy Eating Assessed Today: No  Meal planning: Smaller portions(Varies between portion control and avoiding sweets to no meal planning)  Meals include: Breakfast, Lunch, Dinner, Snacks  Snacks: buying some snacks from the vending machine again at work  Beverages: Water, Diet soda, Sports drinks(reports adding in some juice again)  Has patient met with a dietitian in the past?: Yes    Being Active  Being Active Assessed Today: Yes(has struggled with follow through.  Disucssed 20 minute walks)  Exercise:: Currently not exercising  Barrier to exercise: Access(needs a ride to the gym)    Monitoring  Monitoring Assessed Today: Yes  Did patient bring glucose meter to appointment? : Yes  Blood Glucose Meter: Accu-check  Home Glucose (Sugar) Monitoring: 3-4 times per day  Low Glucose Range (mg/dL):   High Glucose Range (mg/dL): >200  Overall Range (mg/dL): 140-180    173, 179, 130, 163, 177, 131, 140, 118, 191, 139, 169, 135, 171, 194, 123, 118, 81, 101, 204, 121, 135, 133, 125, 124,123, 169, 187, 111, 100, 115, 142, 92    Taking Medications  Diabetes Medication(s)     Insulin       insulin glargine (LANTUS SOLOSTAR PEN) 100 UNIT/ML pen    Inject 25 units once daily   (allow max dose 35 units/day for titration)    Incretin Mimetic Agents (GLP-1 Receptor  Agonists)       semaglutide (OZEMPIC) 1 MG/DOSE pen    Inject 1 mg Subcutaneous every 7 days     Patient taking differently:  Inject 1 mg Subcutaneous every 7 days On thursdays      Lantus 0-0-0-19    Taking Medication Assessed Today: Yes  Current Treatments: Insulin Injections, Non-insulin Injectables  Problems taking diabetes medications regularly?: No  Diabetes medication side effects?: No  Treatment Compliance: All of the time    Problem Solving  Problem Solving Assessed Today: Yes  Hypoglycemia Frequency: Rarely(not recently)  Patient carries a carbohydrate source: Yes    Hypoglycemia symptoms  Tremors: Yes      Reducing Risks  Reducing Risks Assessed Today: No    Healthy Coping  Healthy Coping Assessed Today: Yes  Emotional response to diabetes: Ready to learn  Informal Support system:: Friends, Family, Other  Stage of change: PREPARATION (Decided to change - considering how)(varies between preparation and action)  Support resources: Websites, In-person Offerings  Patient Activation Measure Survey Score:  EMY Score (Last Two) 5/10/2018   EMY Raw Score 35   Activation Score 72.1   EMY Level 3       ASSESSMENT/INTERVENTION:   Has been on the Ozempic 1.0mg x 5 weeks now.  Overall blood sugars improved and mostly in target.  Higher carbohydrate meals likely causing the intermittent post prandial readings; Divina has a hard time staying on track.  Wants to exercise, but cannot find the motivation to start.  Decided to do a 14 day professional FreeStyle Yash again to better evaluate diet.     Diabetes knowledge and skills assessment:   Patient is knowledgeable in diabetes management concepts related to: Healthy Eating, Being Active, Monitoring, Taking Medication, Problem Solving, Reducing Risks and Healthy Coping  Patient needs further education on the following diabetes management concepts: Healthy Eating, Being Active and Reducing Risks  Based on learning assessment above, most appropriate setting for further  diabetes education would be: Individual setting.    Education provided today on:  AADE Self-Care Behaviors:  Healthy Eating: carbohydrate counting, consistency in amount, composition, and timing of food intake and portion control  Being Active: relationship to blood glucose  Taking Medication: insulin needs with nicotine use    Opportunities for ongoing education and support in diabetes-self management were discussed.  Pt verbalized understanding of concepts discussed and recommendations provided today.       Education Materials Provided:  BG Log Sheet      PLAN  See Patient Instructions for co-developed, patient-stated behavior change goals.  AVS printed and provided to patient today. See Follow-Up section for recommended follow-up.    Dolly Riley RD, LD, CDE  Diabetes Education    Time Spent: 30 minutes  Encounter Type: Individual

## 2019-05-28 NOTE — PATIENT INSTRUCTIONS
New A1c June 22nd     Lab Results   Component Value Date    A1C 8.2 03/22/2019    A1C 9.0 02/14/2019    A1C 9.0 11/30/2018    A1C 8.7 11/26/2018    A1C 7.2 06/21/2018       Goal - walk 3 times weekly (4 + songs)     1. Plan to wear the LibrePro sensor for 14 days. It is okay to shower, bathe, and swim (up to 3 feet deep for 30 minutes)    2. Continue with your usual diabetes care plan - check blood sugars and take medicines, as prescribed.    3. Keep a log of what you eat and drink, when you take your medications and how much you take, and exercise you do while you are wearing the sensor.    3. Do not cover the sensor with extra adhesive (the small hole in the center of the sensor must remain uncovered)    4. Use a little extra care, especially when getting dressed or exercising, to avoid accidentally loosening or removing the sensor.     5. Remove the sensor if you need to have an MRI or CT scan.       [facility] for the [facility] Area:  For Your Diabetes Education and Nutrition Appointments Call:  [phone]   For Diabetes Education or Nutrition Related Questions:   Phone: [phone]  E-mail: [email or web address]  Fax: [phone]   If you need a medication refill please contact your pharmacy. Please allow 3 business days for your refills to be completed.    Instructions for emailing the Diabetes Educators    If you need to communicate a non-urgent message to a Diabetes Educator via email, please send to [email or web address].    Please follow the following email guidelines:    Subject line: Secure: your clinic name (example: Secure: [facility])  In the email please include: First name, middle initial, last name and date of birth.    We will be in touch with you within one (1) business day.

## 2019-05-31 DIAGNOSIS — Z79.4 TYPE 2 DIABETES MELLITUS WITH STAGE 3 CHRONIC KIDNEY DISEASE, WITH LONG-TERM CURRENT USE OF INSULIN (H): ICD-10-CM

## 2019-05-31 DIAGNOSIS — N18.30 TYPE 2 DIABETES MELLITUS WITH STAGE 3 CHRONIC KIDNEY DISEASE, WITH LONG-TERM CURRENT USE OF INSULIN (H): ICD-10-CM

## 2019-05-31 DIAGNOSIS — E11.22 TYPE 2 DIABETES MELLITUS WITH STAGE 3 CHRONIC KIDNEY DISEASE, WITH LONG-TERM CURRENT USE OF INSULIN (H): ICD-10-CM

## 2019-05-31 LAB — HBA1C MFR BLD: 8 % (ref 0–5.6)

## 2019-05-31 PROCEDURE — 36415 COLL VENOUS BLD VENIPUNCTURE: CPT | Performed by: NURSE PRACTITIONER

## 2019-05-31 PROCEDURE — 83036 HEMOGLOBIN GLYCOSYLATED A1C: CPT | Performed by: NURSE PRACTITIONER

## 2019-06-10 NOTE — PROGRESS NOTES
Clinic Care Coordination Contact  [facility]/Voicemail    Referral Source: PCP  Clinical Data: Care Coordinator Outreach  Outreach attempted x 1 since last contact.  Left message on voicemail with call back information and requested return call.  Plan: Care Coordinator mailed out care coordination introduction letter on 10/9/17. Care Coordinator will review chart in 1 month and will possibly close as no recent CC needs.  Jose De Jesus COTA,RN- BC  Clinic Care Coordinator  [facility]  Phone: [phone]               Stroke Week 1 Survey      Responses   Facility patient discharged from?  [place]   Does the patient have one of the following disease processes/diagnoses(primary or secondary)?  Stroke (TIA)   Is there a successful TCM telephone encounter documented?  No   Week 1 attempt successful?  No   Revoke  Readmitted          Pati Rangel RN

## 2019-06-11 ENCOUNTER — PATIENT OUTREACH (OUTPATIENT)
Dept: NURSING | Facility: CLINIC | Age: 34
End: 2019-06-11

## 2019-06-11 DIAGNOSIS — Z79.4 TYPE 2 DIABETES MELLITUS WITH STAGE 3 CHRONIC KIDNEY DISEASE, WITH LONG-TERM CURRENT USE OF INSULIN (H): Primary | ICD-10-CM

## 2019-06-11 DIAGNOSIS — N18.30 TYPE 2 DIABETES MELLITUS WITH STAGE 3 CHRONIC KIDNEY DISEASE, WITH LONG-TERM CURRENT USE OF INSULIN (H): Primary | ICD-10-CM

## 2019-06-11 DIAGNOSIS — E11.22 TYPE 2 DIABETES MELLITUS WITH STAGE 3 CHRONIC KIDNEY DISEASE, WITH LONG-TERM CURRENT USE OF INSULIN (H): Primary | ICD-10-CM

## 2019-06-11 PROCEDURE — 99207 ZZC HEALTH COACHING, NO CHARGE: CPT

## 2019-06-11 NOTE — PROGRESS NOTES
June 11, 2019    Health Coaching Progress Note    Patient Name: Divina Delgadillo Date: June 11, 2019      Session Length: 30      DATA    PRM Master Survey Scores Reviewed: Yes    Core Healthy Days Survey:         EMY Score (Last Two) 5/10/2018   EMY Raw Score 35   Activation Score 72.1   EMY Level 3       PHQ-2 Score 4/4/2019 2/14/2019   PHQ-2 Total Score (Adult) - Positive if 3 or more points; Administer PHQ-9 if positive 0 1       PHQ-9 SCORE 11/27/2018 2/14/2019   PHQ-9 Total Score 16 12       Treatment Objective(s) Addressed in This Session:  Target Behavior(s): smoking and disease management/lifestyle changes increasing exercise    Current Stressors / Issues:  Divina notes no stressors or issues at this time.    What Patient Does Well:   Divina has been doing well with working on her goals since our last visit. Divina has exercised, improved eating habits/choices, and has reduced her smoking.  Previous Successes:   Divina reports her weight is down to 186# currently from 192# and is happy about this. Divina has also cut back from her planned 12 cigarettes/day to only 5 (1-1st thing AM, 2-2 during work, 3-1 at arriving home, 5-1 before bed) and is very happy about this as well.  Areas in Need of Improvement:   Divina states no new areas in need of improvement at this time. Divina plans to continue working on current goals. Writer affirms this is a good plan.  Barriers to Change:   Divina shares no barriers today.  Reasons for Change:   Divina wants to make changes for her health and self-esteem.  Plan/Goal for the Next 4 Weeks:   Goals Addressed as of 6/11/2019 at 4:06 PM                 Today    5/21/19      Being Active (pt-stated)   On track  10%    Added 2/13/19 by Mahin Penny     My Goal: I will continue exercising most days of the week-walking daily-and begin incorporating strength training    What I need to meet my goal: Divina plans to discuss with her aunt who is a -may try reaching out again as  she got no response, going some, not as often as planned    I plan to meet my goal by this date: Next Coaching Session        Reducing Risks (pt-stated)   80%      Added 12/5/18 by Mahin Penny     My Goal: I will begin cutting back on cigarette smoking (1ppd--->12 per day----> 5 per day now as of 6/11/19) and continue working toward complete tobacco cessation    What I need to meet my goal: plans to ask provider to place an order for NRT patches (14mg-or step 2?)    I plan to meet my goal by this date: Next Coaching Session    Divina has cut back to 5!   #1-waking  #2 and #3 at work  #4 arrival home  #5 before bed              Intervention:  Motivational Interviewing    MI Intervention: Expressed Empathy/Understanding, Supported Autonomy, Collaboration, Evocation, Open-ended questions, Reflections: simple and complex, Change talk (evoked) and Reframe     Change Talk Expressed by the Patient: Desire to change Ability to change Reasons to change Committment to change Activation Taking steps    Provider Response to Change Talk: E - Evoked more info from patient about behavior change, A - Affirmed patient's thoughts, decisions, or attempts at behavior change, R - Reflected patient's change talk and S - Summarized patient's change talk statements    Assessment / Progress on Treatment Objective(s) / Homework:    Satisfactory progress - ACTION (Actively working towards change); Intervened by reinforcing change plan / affirming steps taken     Plan: (Homework, other):  Patient was encouraged to continue to seek condition-related information and education, as well as schedule a follow up appointment with the Health  in 4 weeks. Patient has set self-identified goals and will monitor progress until the next appointment.  Next visit scheduled for July 9 at 3:30PM.      Mahin Penny, Health   6/11/2019    4:07 PM

## 2019-06-12 ENCOUNTER — ALLIED HEALTH/NURSE VISIT (OUTPATIENT)
Dept: EDUCATION SERVICES | Facility: CLINIC | Age: 34
End: 2019-06-12
Payer: MEDICARE

## 2019-06-12 DIAGNOSIS — N18.30 TYPE 2 DIABETES MELLITUS WITH STAGE 3 CHRONIC KIDNEY DISEASE, WITH LONG-TERM CURRENT USE OF INSULIN (H): Primary | ICD-10-CM

## 2019-06-12 DIAGNOSIS — I10 ESSENTIAL HYPERTENSION: ICD-10-CM

## 2019-06-12 DIAGNOSIS — E11.22 TYPE 2 DIABETES MELLITUS WITH STAGE 3 CHRONIC KIDNEY DISEASE, WITH LONG-TERM CURRENT USE OF INSULIN (H): Primary | ICD-10-CM

## 2019-06-12 DIAGNOSIS — Z79.4 TYPE 2 DIABETES MELLITUS WITH STAGE 3 CHRONIC KIDNEY DISEASE, WITH LONG-TERM CURRENT USE OF INSULIN (H): Primary | ICD-10-CM

## 2019-06-12 PROCEDURE — G0108 DIAB MANAGE TRN  PER INDIV: HCPCS

## 2019-06-12 NOTE — Clinical Note
Ryan Stockton!  (FYI only) Here is Divina's professional continuous glucose monitor we just did.  She went walking 9 times in this 2 week period!  A1c est. At 6.9 here.  We are going to keep doing these - she does 2 full weeks of food logs with it.   The spikes were with pizza/bagels/juice/panckes, etc. Ba Riley RD, LD, CDEDiabetes Education

## 2019-06-12 NOTE — PATIENT INSTRUCTIONS
Kristen YQ  [facility] matt bullard    Keep walking!  You are doing great and it really shows in the blood sugars     Average glucose of 149 = A1c of 6.9       Decrease the Lantus from 19 to 18 units.  Try to check before meals, after a walk.   Update with any blood sugars below 80mg/dL - we will decrease the Lantus more.     Dolly Riley RD, LD, CDE  For Diabetes Education appointments call: [phone]   For Diabetes Education Related Questions call: [phone] or email: [email or web address]

## 2019-06-12 NOTE — PROGRESS NOTES
Diabetes Self-Management Education & Support      Diabetes Education Self-Management & Training: Follow-up and Continuous Glucose Monitor Download    Divina Delgadillo presents today for download and report review of Professional continuous glucose monitor device      Current Diabetes Management per Patient:  Diabetes Medication(s)     Insulin       insulin glargine (LANTUS SOLOSTAR PEN) 100 UNIT/ML pen    Inject 19 units once daily   (allow max dose 25 units/day for titration)    Incretin Mimetic Agents (GLP-1 Receptor Agonists)       semaglutide (OZEMPIC) 1 MG/DOSE pen    Inject 1 mg Subcutaneous every 7 days     Patient taking differently:  Inject 1 mg Subcutaneous every 7 days On thursdays          Taking Medication Assessed Today: Yes  Current Treatments: Insulin Injections, Non-insulin Injectables  Problems taking diabetes medications regularly?: No  Diabetes medication side effects?: No  Treatment Compliance: All of the time    Most Recent A1c Result:    Lab Results   Component Value Date    A1C 8.0 05/31/2019       Continuous Glucose Monitor Interpretation     Reports:                                    Consistent day-to-day patterns: Pattern of post meal hyperglycemia    Healthy Eating  Healthy Eating Assessed Today: No  Meal planning: Smaller portions(Varies between portion control and avoiding sweets to no meal planning)  Meals include: Breakfast, Lunch, Dinner, Snacks  Snacks: buying some snacks from the vending machine again at work  Beverages: Water, Diet soda, Sports drinks(reports adding in some juice again)  Has patient met with a dietitian in the past?: Yes    Being Active  Being Active Assessed Today: Yes(Added in 20 minute walks about 9 times now over the last two weeks)  Exercise:: Yes  Days per week of moderate to strenuous exercise (like a brisk walk): 4  On average, minutes per day of exercise at this level: 20  Exercise Minutes per Week: 80  Barrier to exercise: Access(needs a ride to the  gym)    Monitoring  Monitoring Assessed Today: Yes  Did patient bring glucose meter to appointment? : Yes  Blood Glucose Meter: Accu-check  Home Glucose (Sugar) Monitoring: 3-4 times per day  Low Glucose Range (mg/dL):   High Glucose Range (mg/dL): >200  Overall Range (mg/dL): 140-180    Taking Medications  Diabetes Medication(s)     Insulin       insulin glargine (LANTUS SOLOSTAR PEN) 100 UNIT/ML pen    Inject 19 units once daily   (allow max dose 25 units/day for titration)    Incretin Mimetic Agents (GLP-1 Receptor Agonists)       semaglutide (OZEMPIC) 1 MG/DOSE pen    Inject 1 mg Subcutaneous every 7 days     Patient taking differently:  Inject 1 mg Subcutaneous every 7 days On thursdays          Taking Medication Assessed Today: Yes  Current Treatments: Insulin Injections, Non-insulin Injectables  Problems taking diabetes medications regularly?: No  Diabetes medication side effects?: No  Treatment Compliance: All of the time    Problem Solving  Problem Solving Assessed Today: Yes  Hypoglycemia Frequency: Rarely(not recently)  Patient carries a carbohydrate source: Yes    Hypoglycemia symptoms  Tremors: Yes    Reducing Risks  Reducing Risks Assessed Today: No    Healthy Coping  Healthy Coping Assessed Today: Yes  Emotional response to diabetes: Ready to learn  Informal Support system:: Friends, Family, Other  Stage of change: ACTION (Actively working towards change)(varies between preparation and action)  Support resources: Websites, In-person Offerings  Patient Activation Measure Survey Score:  EMY Score (Last Two) 5/10/2018   EMY Raw Score 35   Activation Score 72.1   EMY Level 3     Assessment:  Medication and/or insulin dosing is: accurate    INTERVENTION:   Average sensor glucose at 149 correlating with an A1c of 6.9 and commended patient on this improvement.  Last A1c was 8 (5/31/19), however this was expected with higher blood sugars over the spring. Time in target at 73% and goal is to keep this at  > 70%.  First day of the sensor often shows false hypoglycemia which is likely where the majority of this is coming from (see 5/29); later on in the sensor there were some very small drops in sensor glucose below 70mg/dL.  Recommended decreasing Lantus from 19 to 18 units once daily; advised patient to check a few pre meal and post exercise blood sugars to catch how low it is going.  Reviewed meals and post prandial hyperglycemia correlating with higher carbohydrate meals.  Recommend continuing to do professional continuous glucose monitors every 1-3 months as patient finds this beneficial and cannot get a personal olayinka (does not meet part B requirements and [facility] won't cover since they are secondary).   Patient walked 9 times during this 14 day period and correlated blood sugar drops and better control with activity.       Lab Results   Component Value Date    A1C 8.0 05/31/2019    A1C 8.2 03/22/2019    A1C 9.0 02/14/2019    A1C 9.0 11/30/2018    A1C 8.7 11/26/2018       Diabetes knowledge and skills assessment:   Patient is knowledgeable in diabetes management concepts related to: Healthy Eating, Being Active, Monitoring, Taking Medication, Problem Solving, Reducing Risks and Healthy Coping  Patient needs further education on the following diabetes management concepts: Healthy Eating, Being Active and Healthy Coping  Based on learning assessment above, most appropriate setting for further diabetes education would be: Individual setting.    Education provided today on:  AADE Self-Care Behaviors:  Healthy Eating: consistency in amount, composition, and timing of food intake  Being Active: relationship to blood glucose  Monitoring: frequency of monitoring  Healthy Coping: benefits of making appropriate lifestyle changes    Pt verbalized understanding of concepts discussed and recommendations provided today.       Education Materials Provided:  No new materials provided today    PLAN:  See Patient Instructions for  co-developed, patient-stated behavior change goals.  AVS printed and provided to patient today. See Follow-Up section for recommended follow-up.    Dolly Riley RD, LD, CDE  Diabetes Education    Time Spent: 30 minutes  Encounter Type: Individual

## 2019-06-13 DIAGNOSIS — H66.3X3 CHRONIC SUPPURATIVE OTITIS MEDIA OF BOTH EARS, UNSPECIFIED OTITIS MEDIA LOCATION: ICD-10-CM

## 2019-06-13 RX ORDER — AMLODIPINE BESYLATE 5 MG/1
10 TABLET ORAL DAILY
Qty: 180 TABLET | Refills: 1 | Status: SHIPPED | OUTPATIENT
Start: 2019-06-13 | End: 2019-11-30

## 2019-06-13 RX ORDER — CIPROFLOXACIN AND DEXAMETHASONE 3; 1 MG/ML; MG/ML
4 SUSPENSION/ DROPS AURICULAR (OTIC) 2 TIMES DAILY
Qty: 7.5 ML | Refills: 0 | OUTPATIENT
Start: 2019-06-13

## 2019-06-13 NOTE — TELEPHONE ENCOUNTER
Prescription approved per [facility] Refill Protocol.  Mere KUNZ RN    OV notes 2-14-19:  Essential hypertension  -well controlled   - lisinopril (PRINIVIL/ZESTRIL) 10 MG tablet; Take 1 tablet (10 mg) by mouth daily  Dispense: 90 tablet; Refill: 3  - amLODIPine (NORVASC) 5 MG tablet; Take 2 tablets (10 mg) by mouth daily  Dispense: 90 tablet; Refill: 1

## 2019-06-13 NOTE — TELEPHONE ENCOUNTER
Medication was denied by provider. Fax sent to pharmacy with denial.   Luz GRAY RN BSN PHN  [facility]

## 2019-06-13 NOTE — TELEPHONE ENCOUNTER
"Requested Prescriptions   Pending Prescriptions Disp Refills     amLODIPine (NORVASC) 5 MG tablet [Pharmacy Med Name: AMLODIPINE BESYLATE 5 MG TABLET] 90 tablet 1     Sig: Take 2 tablets (10 mg) by mouth daily       Calcium Channel Blockers Protocol  Failed - 6/12/2019  4:38 PM        Failed - Normal serum creatinine on file in past 12 months     Recent Labs   Lab Test 04/04/19  0943  09/01/17  1049   CR 1.23*   < >  --    CREAT  --   --  1.6*    < > = values in this interval not displayed.             Passed - Blood pressure under 140/90 in past 12 months     BP Readings from Last 3 Encounters:   04/15/19 126/71   04/04/19 144/80   03/24/19 154/63                 Passed - Recent (12 mo) or future (30 days) visit within the authorizing provider's specialty     Patient had office visit in the last 12 months or has a visit in the next 30 days with authorizing provider or within the authorizing provider's specialty.  See \"Patient Info\" tab in inbasket, or \"Choose Columns\" in Meds & Orders section of the refill encounter.              Passed - Medication is active on med list        Passed - Patient is age 18 or older        Passed - No active pregnancy on record        Passed - No positive pregnancy test in past 12 months        Last Written Prescription Date:  2/14/19  Last Fill Quantity: 90,  # refills: 1   Last office visit: 4/4/2019 with prescribing provider:  Eva   Future Office Visit:   Next 5 appointments (look out 90 days)    Jul 16, 2019 10:30 AM CDT  [facility] [id number] [place] [id number] [phone]           "

## 2019-06-18 DIAGNOSIS — F25.0 SCHIZOAFFECTIVE DISORDER, BIPOLAR TYPE (H): Primary | ICD-10-CM

## 2019-07-01 DIAGNOSIS — F25.0 SCHIZOAFFECTIVE DISORDER, BIPOLAR TYPE (H): ICD-10-CM

## 2019-07-01 LAB
BASOPHILS # BLD AUTO: 0.2 10E9/L (ref 0–0.2)
BASOPHILS NFR BLD AUTO: 1 %
DIFFERENTIAL METHOD BLD: ABNORMAL
EOSINOPHIL # BLD AUTO: 0.2 10E9/L (ref 0–0.7)
EOSINOPHIL NFR BLD AUTO: 1 %
ERYTHROCYTE [DISTWIDTH] IN BLOOD BY AUTOMATED COUNT: 13.5 % (ref 10–15)
HCT VFR BLD AUTO: 40.6 % (ref 35–47)
HGB BLD-MCNC: 12.7 G/DL (ref 11.7–15.7)
LYMPHOCYTES # BLD AUTO: 4.7 10E9/L (ref 0.8–5.3)
LYMPHOCYTES NFR BLD AUTO: 28 %
MACROCYTES BLD QL SMEAR: PRESENT
MCH RBC QN AUTO: 30.3 PG (ref 26.5–33)
MCHC RBC AUTO-ENTMCNC: 31.3 G/DL (ref 31.5–36.5)
MCV RBC AUTO: 97 FL (ref 78–100)
MONOCYTES # BLD AUTO: 1.5 10E9/L (ref 0–1.3)
MONOCYTES NFR BLD AUTO: 9 %
NEUTROPHILS # BLD AUTO: 10.2 10E9/L (ref 1.6–8.3)
NEUTROPHILS NFR BLD AUTO: 61 %
PLATELET # BLD AUTO: 373 10E9/L (ref 150–450)
PLATELET # BLD EST: ABNORMAL 10*3/UL
RBC # BLD AUTO: 4.19 10E12/L (ref 3.8–5.2)
VARIANT LYMPHS BLD QL SMEAR: PRESENT
WBC # BLD AUTO: 16.8 10E9/L (ref 4–11)

## 2019-07-01 PROCEDURE — 36415 COLL VENOUS BLD VENIPUNCTURE: CPT | Performed by: CLINICAL NURSE SPECIALIST

## 2019-07-01 PROCEDURE — 85025 COMPLETE CBC W/AUTO DIFF WBC: CPT | Performed by: CLINICAL NURSE SPECIALIST

## 2019-07-03 ENCOUNTER — OFFICE VISIT (OUTPATIENT)
Dept: FAMILY MEDICINE | Facility: CLINIC | Age: 34
End: 2019-07-03
Payer: MEDICARE

## 2019-07-03 VITALS
OXYGEN SATURATION: 98 % | BODY MASS INDEX: 32.35 KG/M2 | TEMPERATURE: 99.3 F | DIASTOLIC BLOOD PRESSURE: 62 MMHG | WEIGHT: 189.5 LBS | HEIGHT: 64 IN | RESPIRATION RATE: 18 BRPM | HEART RATE: 91 BPM | SYSTOLIC BLOOD PRESSURE: 108 MMHG

## 2019-07-03 DIAGNOSIS — N18.30 TYPE 2 DIABETES MELLITUS WITH STAGE 3 CHRONIC KIDNEY DISEASE, WITH LONG-TERM CURRENT USE OF INSULIN (H): Primary | ICD-10-CM

## 2019-07-03 DIAGNOSIS — Z79.4 TYPE 2 DIABETES MELLITUS WITH STAGE 3 CHRONIC KIDNEY DISEASE, WITH LONG-TERM CURRENT USE OF INSULIN (H): Primary | ICD-10-CM

## 2019-07-03 DIAGNOSIS — Z71.6 ENCOUNTER FOR SMOKING CESSATION COUNSELING: ICD-10-CM

## 2019-07-03 DIAGNOSIS — I10 ESSENTIAL HYPERTENSION: ICD-10-CM

## 2019-07-03 DIAGNOSIS — E78.5 HYPERLIPIDEMIA LDL GOAL <100: ICD-10-CM

## 2019-07-03 DIAGNOSIS — Z86.69 HISTORY OF RECURRENT EAR INFECTION: ICD-10-CM

## 2019-07-03 DIAGNOSIS — E11.22 TYPE 2 DIABETES MELLITUS WITH STAGE 3 CHRONIC KIDNEY DISEASE, WITH LONG-TERM CURRENT USE OF INSULIN (H): Primary | ICD-10-CM

## 2019-07-03 LAB
ANION GAP SERPL CALCULATED.3IONS-SCNC: 7 MMOL/L (ref 3–14)
BUN SERPL-MCNC: 35 MG/DL (ref 7–30)
CALCIUM SERPL-MCNC: 9.3 MG/DL (ref 8.5–10.1)
CHLORIDE SERPL-SCNC: 107 MMOL/L (ref 94–109)
CO2 SERPL-SCNC: 22 MMOL/L (ref 20–32)
CREAT SERPL-MCNC: 1.98 MG/DL (ref 0.52–1.04)
GFR SERPL CREATININE-BSD FRML MDRD: 32 ML/MIN/{1.73_M2}
GLUCOSE SERPL-MCNC: 117 MG/DL (ref 70–99)
HBA1C MFR BLD: 7.7 % (ref 0–5.6)
POTASSIUM SERPL-SCNC: 4.5 MMOL/L (ref 3.4–5.3)
SODIUM SERPL-SCNC: 136 MMOL/L (ref 133–144)

## 2019-07-03 PROCEDURE — 80048 BASIC METABOLIC PNL TOTAL CA: CPT | Performed by: NURSE PRACTITIONER

## 2019-07-03 PROCEDURE — 99214 OFFICE O/P EST MOD 30 MIN: CPT | Performed by: NURSE PRACTITIONER

## 2019-07-03 PROCEDURE — 83036 HEMOGLOBIN GLYCOSYLATED A1C: CPT | Performed by: NURSE PRACTITIONER

## 2019-07-03 PROCEDURE — 36415 COLL VENOUS BLD VENIPUNCTURE: CPT | Performed by: NURSE PRACTITIONER

## 2019-07-03 ASSESSMENT — MIFFLIN-ST. JEOR: SCORE: 1549.57

## 2019-07-03 NOTE — PATIENT INSTRUCTIONS
Labs recheck A1C, BMP    Schedule with ENT doctor    Ciprodex 4 drops into left ear twice daily for 7 days

## 2019-07-03 NOTE — PROGRESS NOTES
Subjective     Divina Delgadillo is a 34 year old female who presents to clinic today for the following health issues: states blood sugar numbers improved, running around 140-180', she lost 6 lbs, trying to exercises more, but states sometimes has no motivation.     HPI   Diabetes Follow-up      How often are you checking your blood sugar? Three times daily    What time of day are you checking your blood sugars (select all that apply)?  Before and after meals    Have you had any blood sugars above 200?  Yes once 205     Have you had any blood sugars below 70?  No    What symptoms do you notice when your blood sugar is low?  None    What concerns do you have today about your diabetes? None     Do you have any of these symptoms? (Select all that apply)  Excessive thirst     Have you had a diabetic eye exam in the last 12 months? No    Diabetes Management Resources    Hyperlipidemia Follow-Up      Are you having any of the following symptoms? (Select all that apply)  No complaints of shortness of breath, chest pain or pressure.  No increased sweating or nausea with activity.  No left-sided neck or arm pain.  No complaints of pain in calves when walking 1-2 blocks.    Are you regularly taking any medication or supplement to lower your cholesterol?   No    Are you having muscle aches or other side effects that you think could be caused by your cholesterol lowering medication?  No    Hypertension Follow-up      Do you check your blood pressure regularly outside of the clinic? Yes  Every morning     Are you following a low salt diet? No    Are your blood pressures ever more than 140 on the top number (systolic) OR more   than 90 on the bottom number (diastolic), for example 140/90? No    BP Readings from Last 2 Encounters:   07/03/19 108/62   04/15/19 126/71     Hemoglobin A1C (%)   Date Value   07/03/2019 7.7 (H)   05/31/2019 8.0 (H)     LDL Cholesterol Calculated (mg/dL)   Date Value   06/21/2018 141 (H)   06/15/2017 192  "(H)       Amount of exercise or physical activity: 2-3 days/week for an average of 45-60 minutes    Problems taking medications regularly: No    Medication side effects: none    Diet: regular (no restrictions)      Reviewed and updated as needed this visit by Provider  Tobacco  Allergies  Meds  Problems  Med Hx  Surg Hx  Fam Hx         Review of Systems   ROS COMP: Constitutional, HEENT, cardiovascular, pulmonary, gi and gu systems are negative, except as otherwise noted.      Objective    /62 (BP Location: Left arm, Patient Position: Sitting, Cuff Size: Adult Regular)   Pulse 91   Temp 99.3  F (37.4  C) (Tympanic)   Resp 18   Ht 1.626 m (5' 4\")   Wt 86 kg (189 lb 8 oz)   SpO2 98%   BMI 32.53 kg/m    Body mass index is 32.53 kg/m .  Physical Exam   GENERAL: healthy, alert and no distress  LEFT ear: mild edema and erythema of the ear canal, TM intact   CV: regular rate and rhythm, normal S1 S2, no S3 or S4, no murmur, click or rub, no peripheral edema and peripheral pulses strong  MS: no gross musculoskeletal defects noted, no edema  SKIN: no suspicious lesions or rashes  NEURO: Normal strength and tone, mentation intact and speech normal  PSYCH: mentation appears normal, affect normal/bright    Diagnostic Test Results:  Labs reviewed in Epic  Results for orders placed or performed in visit on 07/03/19 (from the past 24 hour(s))   Hemoglobin A1c   Result Value Ref Range    Hemoglobin A1C 7.7 (H) 0 - 5.6 %           Assessment & Plan     1. Type 2 diabetes mellitus with stage 3 chronic kidney disease, with long-term current use of insulin (H)  -improved  -continue Lantus 19 units daily  -continue Ozempic 1 mg weekly  -work on weight loss, exercise more   - Hemoglobin A1c  - Basic metabolic panel  -follow up in 3 months     2. Encounter for smoking cessation counseling  -states she quit 1 week ago  - nicotine (NICOTROL) 10 MG inhaler; Inhale 6-10 Cartridges into the lungs daily as needed for smoking " "cessation  Dispense: 168 each; Refill: 0    3. History of recurrent ear infection  -recurrent ear infections   - OTOLARYNGOLOGY REFERRAL    4. Essential hypertension  -stable, well controlled     5. Hyperlipidemia LDL goal <100    - Lipid panel reflex to direct LDL Fasting; Future     BMI:   Estimated body mass index is 32.53 kg/m  as calculated from the following:    Height as of this encounter: 1.626 m (5' 4\").    Weight as of this encounter: 86 kg (189 lb 8 oz).   Weight management plan: Discussed healthy diet and exercise guidelines        Work on weight loss  Regular exercise  See Patient Instructions    Return in about 3 months (around 10/3/2019) for diabetes.    VERONIQUE Spears [facility]      "

## 2019-07-08 DIAGNOSIS — E11.22 TYPE 2 DIABETES MELLITUS WITH STAGE 3 CHRONIC KIDNEY DISEASE, WITH LONG-TERM CURRENT USE OF INSULIN (H): ICD-10-CM

## 2019-07-08 DIAGNOSIS — E78.5 HYPERLIPIDEMIA LDL GOAL <100: ICD-10-CM

## 2019-07-08 DIAGNOSIS — Z79.4 TYPE 2 DIABETES MELLITUS WITH STAGE 3 CHRONIC KIDNEY DISEASE, WITH LONG-TERM CURRENT USE OF INSULIN (H): ICD-10-CM

## 2019-07-08 DIAGNOSIS — N18.30 TYPE 2 DIABETES MELLITUS WITH STAGE 3 CHRONIC KIDNEY DISEASE, WITH LONG-TERM CURRENT USE OF INSULIN (H): ICD-10-CM

## 2019-07-08 DIAGNOSIS — I10 ESSENTIAL HYPERTENSION: ICD-10-CM

## 2019-07-08 DIAGNOSIS — N18.30 CKD (CHRONIC KIDNEY DISEASE) STAGE 3, GFR 30-59 ML/MIN (H): Primary | ICD-10-CM

## 2019-07-08 LAB
CHOLEST SERPL-MCNC: 278 MG/DL
CREAT SERPL-MCNC: 1.97 MG/DL (ref 0.52–1.04)
GFR SERPL CREATININE-BSD FRML MDRD: 33 ML/MIN/{1.73_M2}
HDLC SERPL-MCNC: 49 MG/DL
LDLC SERPL CALC-MCNC: 159 MG/DL
NONHDLC SERPL-MCNC: 229 MG/DL
TRIGL SERPL-MCNC: 348 MG/DL

## 2019-07-08 PROCEDURE — 80061 LIPID PANEL: CPT | Performed by: NURSE PRACTITIONER

## 2019-07-08 PROCEDURE — 36415 COLL VENOUS BLD VENIPUNCTURE: CPT | Performed by: NURSE PRACTITIONER

## 2019-07-08 PROCEDURE — 82565 ASSAY OF CREATININE: CPT | Performed by: NURSE PRACTITIONER

## 2019-07-09 ENCOUNTER — PATIENT OUTREACH (OUTPATIENT)
Dept: NURSING | Facility: CLINIC | Age: 34
End: 2019-07-09

## 2019-07-09 DIAGNOSIS — Z79.4 TYPE 2 DIABETES MELLITUS WITH STAGE 3 CHRONIC KIDNEY DISEASE, WITH LONG-TERM CURRENT USE OF INSULIN (H): Primary | ICD-10-CM

## 2019-07-09 DIAGNOSIS — N18.30 TYPE 2 DIABETES MELLITUS WITH STAGE 3 CHRONIC KIDNEY DISEASE, WITH LONG-TERM CURRENT USE OF INSULIN (H): Primary | ICD-10-CM

## 2019-07-09 DIAGNOSIS — E11.22 TYPE 2 DIABETES MELLITUS WITH STAGE 3 CHRONIC KIDNEY DISEASE, WITH LONG-TERM CURRENT USE OF INSULIN (H): Primary | ICD-10-CM

## 2019-07-09 PROCEDURE — 99207 ZZC HEALTH COACHING, NO CHARGE: CPT

## 2019-07-09 NOTE — PROGRESS NOTES
July 9, 2019    Health Coaching Progress Note    Patient Name: Divina Delgadillo Date: July 9, 2019      Session Length: 25      DATA    PRM Master Survey Scores Reviewed: Yes    Core Healthy Days Survey:         EMY Score (Last Two) 5/10/2018   EMY Raw Score 35   Activation Score 72.1   EMY Level 3       PHQ-2 Score 4/4/2019 2/14/2019   PHQ-2 Total Score (Adult) - Positive if 3 or more points; Administer PHQ-9 if positive 0 1       PHQ-9 SCORE 11/27/2018 2/14/2019   PHQ-9 Total Score 16 12       Treatment Objective(s) Addressed in This Session:  Target Behavior(s): diet/weight loss and smoking    Current Stressors / Issues:  Divina shares that she has had a very difficult week at work this week but that she has not increased her smoking as a result. Writer affirms this is impressive and we talk about how Divina coped.    What Patient Does Well:   See above. Divina reports she is only smoking 4 cigarettes per day now: 1 morning, 1 when arrives to work, 1 at lunch, 1 when arrives back home. Writer affirms this is great progress.  Previous Successes:   Divina shares that she has been walking more again and feels good as a result and has noted lower BG readings.  Areas in Need of Improvement:   Divina says the area in need of improvement is setting a quit date soon. Divina will follow up with writer if it occurs before our next scheduled visit. Writer agrees with this plan.  Barriers to Change:   Divina is currently under a lot of personal stress.  Reasons for Change:   Divina wants to make changes for her health and self-esteem.  Plan/Goal for the Next 4 Weeks:   Goals Addressed as of 7/9/2019 at 3:54 PM                 Today    6/11/19      Being Active (pt-stated)   On track  On track    Added 2/13/19 by Mahin Penny     My Goal: I will continue exercising most days of the week-walking daily-and begin incorporating strength training    What I need to meet my goal: Divina plans to discuss with her aunt who is a personal  -may try reaching out again as she got no response, going some, not as often as planned    I plan to meet my goal by this date: Next Coaching Session        Reducing Risks (pt-stated)   90%  80%    Added 12/5/18 by Mahin Penny     My Goal: I will begin cutting back on cigarette smoking (1ppd--->12 per day----> 5 per day now as of 6/11/19--->4 per day now 7/9/19) and continue working toward complete tobacco cessation    What I need to meet my goal: Nothing needed at this time, got nicotrol inhaler    I plan to meet my goal by this date: Next Coaching Session    Divina has cut back to 4!   #1-waking  #2 (AM) and #3 (lunch) at work  #4 arrival home                Intervention:  Motivational Interviewing    MI Intervention: Expressed Empathy/Understanding, Supported Autonomy, Collaboration, Evocation, Permission to raise concern or advise, Open-ended questions, Reflections: simple and complex, Change talk (evoked) and Reframe     Change Talk Expressed by the Patient: Desire to change Ability to change Reasons to change Committment to change Activation Taking steps    Provider Response to Change Talk: E - Evoked more info from patient about behavior change, A - Affirmed patient's thoughts, decisions, or attempts at behavior change, R - Reflected patient's change talk and S - Summarized patient's change talk statements    Assessment / Progress on Treatment Objective(s) / Homework:    Satisfactory progress - ACTION (Actively working towards change); Intervened by reinforcing change plan / affirming steps taken     Plan: (Homework, other):  Patient was encouraged to continue to seek condition-related information and education, as well as schedule a follow up appointment with the Health  in 2 weeks. Patient has set self-identified goals and will monitor progress until the next appointment.  Next visit scheduled for July 23 at 3:30PM.      Mahin Penny, Health   7/9/2019    3:55 PM

## 2019-07-10 ENCOUNTER — TELEPHONE (OUTPATIENT)
Dept: GASTROENTEROLOGY | Facility: CLINIC | Age: 34
End: 2019-07-10

## 2019-07-10 DIAGNOSIS — K76.0 FATTY LIVER: Primary | ICD-10-CM

## 2019-07-10 NOTE — TELEPHONE ENCOUNTER
Lorin contacted and reminded of pt upcoming appointment.  Patient instructed to bring updated medications list to appointment.    Meg Gomez CMA  7/10/2019 10:55 AM

## 2019-07-11 ENCOUNTER — OFFICE VISIT (OUTPATIENT)
Dept: GASTROENTEROLOGY | Facility: CLINIC | Age: 34
End: 2019-07-11
Attending: PHYSICIAN ASSISTANT
Payer: MEDICARE

## 2019-07-11 VITALS
HEART RATE: 80 BPM | DIASTOLIC BLOOD PRESSURE: 77 MMHG | SYSTOLIC BLOOD PRESSURE: 136 MMHG | TEMPERATURE: 98.5 F | WEIGHT: 187.6 LBS | OXYGEN SATURATION: 99 % | BODY MASS INDEX: 32.2 KG/M2

## 2019-07-11 DIAGNOSIS — K76.0 FATTY LIVER: Primary | ICD-10-CM

## 2019-07-11 DIAGNOSIS — K76.0 FATTY LIVER: ICD-10-CM

## 2019-07-11 DIAGNOSIS — E78.5 HYPERLIPIDEMIA LDL GOAL <100: Primary | ICD-10-CM

## 2019-07-11 PROCEDURE — G0463 HOSPITAL OUTPT CLINIC VISIT: HCPCS | Mod: ZF

## 2019-07-11 PROCEDURE — 91200 LIVER ELASTOGRAPHY: CPT | Mod: ZF

## 2019-07-11 ASSESSMENT — PAIN SCALES - GENERAL: PAINLEVEL: NO PAIN (0)

## 2019-07-11 NOTE — NURSING NOTE
"Chief Complaint   Patient presents with     RECHECK     6 month follwo up      Vital signs:  Temp: 98.5  F (36.9  C) Temp src: Oral BP: 136/77 Pulse: 80     SpO2: 99 %       Weight: 85.1 kg (187 lb 9.6 oz)  Estimated body mass index is 32.2 kg/m  as calculated from the following:    Height as of 7/3/19: 1.626 m (5' 4\").    Weight as of this encounter: 85.1 kg (187 lb 9.6 oz).        Devi Holt CMA    "

## 2019-07-11 NOTE — PROGRESS NOTES
"Hepatology Follow-up Clinic note  Divina Delgadillo   Date of Birth 1985  Date of Service 7/11/2019    Reason for follow-up: History of FLD          Assessment/plan:   Divina Delgadillo is a 34 year old female with history of elevated LFT's thought due to fatty liver disease. She also has a history of mucinous cystadenoma s/p partial pancreatectomy/splenectomy. Fibrosis scan today showing F0-F1 5.2 kilopascals. Her transaminases have improved a bit (ALT 45 and AST 23) but her Alk Phos remains persistently elevated. She has no stigmata of advanced liver disease.     - US abdomen to evaluate hepatobiliary anatomy   - Liver biopsy to evaluate etiology of elevated transaminases and Alk Phos   - Continue slow gradual weight loss  - Continue working with RD  - Check AMA   - Follow up in clinic with Dr. Leventhal in 6 months     Sonja Bueno PA-C   [facility] Hepatology clinic    -----------------------------------------------------       HPI:   Divina Delgadillo is a 34 year old female  presenting for follow-up. He is accompanied by his     Dx: History of elevated LFT's Fatty Liver disease  Hx of history of pancreatic cancer: 17 cm mucinous cystadenoma, left adrenal gland cortical adenoma, S/P partial pancreatectomy/splenectomy in 2015    Patient was last seen on 1/31/2019. She was hospitalized in March for dehydration and KIRSTIE. She denies any new medications.     She states she has lost about 6 lbs in the last six months. She has not been going to the gym very much over the past few months, states it's due to \"laziness.\"  She states she may have intermittent low extremity edema. She has regular bowel movements.     Patient denies jaundice, lower extremity edema, abdominal distension or confusion.  Patient also denies melena, hematochezia or hematemesis. Patient denies fevers, sweats or chills.    She is not drink alcohol.     Cholesterol 278  HDL 49    Triglycerides: 348  F-actin - 8  A1AT - " 116  TTG - normal   DONA negative   AMA - pending    Medical hx Surgical hx   Past Medical History:   Diagnosis Date     Cervical high risk HPV (human papillomavirus) test positive 6/20/2017     Depressive disorder, not elsewhere classified      DVT (deep venous thrombosis) (H)      Dysmetabolic syndrome X      Esophageal reflux      Mild intermittent asthma      Other abnormal heart sounds      Other specified types of schizophrenia, unspecified condition      Pancreatic cancer (H)      Type II or unspecified type diabetes mellitus without mention of complication, not stated as uncontrolled      Unspecified disorder of tympanic membrane     Past Surgical History:   Procedure Laterality Date     ADRENALECTOMY       PANCREATECTOMY PARTIAL       SPLENECTOMY       SURGICAL HISTORY OF -   10/1/2000    Tympanoplasty     SURGICAL HISTORY OF -   10/1/2000    Tonsillectomy                 Medications:     Current Outpatient Medications   Medication     amLODIPine (NORVASC) 5 MG tablet     ARIPiprazole (ABILIFY) 30 MG tablet     blood glucose (NO BRAND SPECIFIED) test strip     blood glucose monitoring (SOFTCLIX) lancets     Blood Glucose Monitoring Suppl (ACCU-CHEK GUIDE) w/Device KIT     carvedilol (COREG) 6.25 MG tablet     Clozapine (CLOZARIL) 200 MG tablet     FLUoxetine (PROZAC) 20 MG capsule     insulin glargine (LANTUS SOLOSTAR PEN) 100 UNIT/ML pen     lamoTRIgine (LAMICTAL) 25 MG tablet     lisinopril (PRINIVIL/ZESTRIL) 10 MG tablet     loratadine (CLARITIN) 10 MG tablet     montelukast (SINGULAIR) 10 MG tablet     nicotine (NICOTROL) 10 MG inhaler     omeprazole (PRILOSEC) 20 MG CR capsule     ranitidine (ZANTAC) 300 MG tablet     semaglutide (OZEMPIC) 1 MG/DOSE pen     ULTICARE MINI 31G X 6 MM insulin pen needle     ULTILET SHARPS CONTAINER 1QT MISC     albuterol (PROAIR HFA/PROVENTIL HFA/VENTOLIN HFA) 108 (90 BASE) MCG/ACT Inhaler     No current facility-administered medications for this visit.              Allergies:     Allergies   Allergen Reactions     Acetaminophen Other (See Comments)     pt previously tried to overdose on it, PMD does not want pt taking per pt.      Latex Rash            Review of Systems:   10 points ROS was obtained and highlighted in the HPI, otherwise negative.          Physical Exam:   VS:  /77 (BP Location: Right arm)   Pulse 80   Temp 98.5  F (36.9  C) (Oral)   Wt 85.1 kg (187 lb 9.6 oz)   SpO2 99%   BMI 32.20 kg/m        Gen- well, NAD, A+Ox3, normal color  Lym- no palpable LAD  CVS- RRR  RS- CTA  Abd- central adiposity, hepatomegaly   Extr- hands normal, no LANDRY  Skin- no rash or jaundice  Neuro- no asterixis  Psych- normal mood           Data:   Reviewed in person and significant for:    Lab Results   Component Value Date     07/03/2019      Lab Results   Component Value Date    POTASSIUM 4.5 07/03/2019     Lab Results   Component Value Date    CHLORIDE 107 07/03/2019     Lab Results   Component Value Date    CO2 22 07/03/2019     Lab Results   Component Value Date    BUN 35 07/03/2019     Lab Results   Component Value Date    CR 1.97 07/08/2019       Lab Results   Component Value Date    WBC 16.8 07/01/2019     Lab Results   Component Value Date    HGB 12.7 07/01/2019     Lab Results   Component Value Date    HCT 40.6 07/01/2019     Lab Results   Component Value Date    MCV 97 07/01/2019     Lab Results   Component Value Date     07/01/2019       Lab Results   Component Value Date    AST 23 03/22/2019     Lab Results   Component Value Date    ALT 45 03/22/2019     Lab Results   Component Value Date    BILICONJ 0.0 06/23/2009      Lab Results   Component Value Date    BILITOTAL 0.3 03/22/2019       Lab Results   Component Value Date    ALBUMIN 3.8 03/22/2019     Lab Results   Component Value Date    PROTTOTAL 8.3 03/22/2019      Lab Results   Component Value Date    ALKPHOS 161 03/22/2019       Lab Results   Component Value Date    INR 0.95 01/30/2019     US  ABDOMEN COMPLETE:   6/28/2018:   1. Postoperative changes of prior splenectomy.  2. Otherwise unremarkable abdominal ultrasound.

## 2019-07-11 NOTE — LETTER
"7/11/2019      RE: Divina Delgadillo  [address] [address] [place] [id number]       Hepatology Follow-up Clinic note  Divina Delgadillo   Date of Birth 1985  Date of Service 7/11/2019    Reason for follow-up: History of FLD          Assessment/plan:   Divina Delgadillo is a 34 year old female with history of elevated LFT's thought due to fatty liver disease. She also has a history of mucinous cystadenoma s/p partial pancreatectomy/splenectomy. Fibrosis scan today showing F0-F1 5.2 kilopascals. Her transaminases have improved a bit (ALT 45 and AST 23) but her Alk Phos remains persistently elevated. She has no stigmata of advanced liver disease.     - US abdomen to evaluate hepatobiliary anatomy   - Liver biopsy to evaluate etiology of elevated transaminases and Alk Phos   - Continue slow gradual weight loss  - Continue working with RD  - Check AMA   - Follow up in clinic with Dr. Leventhal in 6 months     Sonja Bueno PA-C   [facility] Hepatology clinic    -----------------------------------------------------       HPI:   Divina Delgadillo is a 34 year old female  presenting for follow-up. He is accompanied by his     Dx: History of elevated LFT's Fatty Liver disease  Hx of history of pancreatic cancer: 17 cm mucinous cystadenoma, left adrenal gland cortical adenoma, S/P partial pancreatectomy/splenectomy in 2015    Patient was last seen on 1/31/2019. She was hospitalized in March for dehydration and KIRSTIE. She denies any new medications.     She states she has lost about 6 lbs in the last six months. She has not been going to the gym very much over the past few months, states it's due to \"laziness.\"  She states she may have intermittent low extremity edema. She has regular bowel movements.     Patient denies jaundice, lower extremity edema, abdominal distension or confusion.  Patient also denies melena, hematochezia or hematemesis. Patient denies fevers, sweats or chills.    She is not drink " alcohol.     Cholesterol 278  HDL 49    Triglycerides: 348  F-actin - 8  A1AT - 116  TTG - normal   DONA negative   AMA - pending    Medical hx Surgical hx   Past Medical History:   Diagnosis Date     Cervical high risk HPV (human papillomavirus) test positive 6/20/2017     Depressive disorder, not elsewhere classified      DVT (deep venous thrombosis) (H)      Dysmetabolic syndrome X      Esophageal reflux      Mild intermittent asthma      Other abnormal heart sounds      Other specified types of schizophrenia, unspecified condition      Pancreatic cancer (H)      Type II or unspecified type diabetes mellitus without mention of complication, not stated as uncontrolled      Unspecified disorder of tympanic membrane     Past Surgical History:   Procedure Laterality Date     ADRENALECTOMY       PANCREATECTOMY PARTIAL       SPLENECTOMY       SURGICAL HISTORY OF -   10/1/2000    Tympanoplasty     SURGICAL HISTORY OF -   10/1/2000    Tonsillectomy                 Medications:     Current Outpatient Medications   Medication     amLODIPine (NORVASC) 5 MG tablet     ARIPiprazole (ABILIFY) 30 MG tablet     blood glucose (NO BRAND SPECIFIED) test strip     blood glucose monitoring (SOFTCLIX) lancets     Blood Glucose Monitoring Suppl (ACCU-CHEK GUIDE) w/Device KIT     carvedilol (COREG) 6.25 MG tablet     Clozapine (CLOZARIL) 200 MG tablet     FLUoxetine (PROZAC) 20 MG capsule     insulin glargine (LANTUS SOLOSTAR PEN) 100 UNIT/ML pen     lamoTRIgine (LAMICTAL) 25 MG tablet     lisinopril (PRINIVIL/ZESTRIL) 10 MG tablet     loratadine (CLARITIN) 10 MG tablet     montelukast (SINGULAIR) 10 MG tablet     nicotine (NICOTROL) 10 MG inhaler     omeprazole (PRILOSEC) 20 MG CR capsule     ranitidine (ZANTAC) 300 MG tablet     semaglutide (OZEMPIC) 1 MG/DOSE pen     ULTICARE MINI 31G X 6 MM insulin pen needle     ULTILET SHARPS CONTAINER 1QT MISC     albuterol (PROAIR HFA/PROVENTIL HFA/VENTOLIN HFA) 108 (90 BASE) MCG/ACT  Inhaler     No current facility-administered medications for this visit.             Allergies:     Allergies   Allergen Reactions     Acetaminophen Other (See Comments)     pt previously tried to overdose on it, PMD does not want pt taking per pt.      Latex Rash            Review of Systems:   10 points ROS was obtained and highlighted in the HPI, otherwise negative.          Physical Exam:   VS:  /77 (BP Location: Right arm)   Pulse 80   Temp 98.5  F (36.9  C) (Oral)   Wt 85.1 kg (187 lb 9.6 oz)   SpO2 99%   BMI 32.20 kg/m         Gen- well, NAD, A+Ox3, normal color  Lym- no palpable LAD  CVS- RRR  RS- CTA  Abd- central adiposity, hepatomegaly   Extr- hands normal, no LANDRY  Skin- no rash or jaundice  Neuro- no asterixis  Psych- normal mood           Data:   Reviewed in person and significant for:    Lab Results   Component Value Date     07/03/2019      Lab Results   Component Value Date    POTASSIUM 4.5 07/03/2019     Lab Results   Component Value Date    CHLORIDE 107 07/03/2019     Lab Results   Component Value Date    CO2 22 07/03/2019     Lab Results   Component Value Date    BUN 35 07/03/2019     Lab Results   Component Value Date    CR 1.97 07/08/2019       Lab Results   Component Value Date    WBC 16.8 07/01/2019     Lab Results   Component Value Date    HGB 12.7 07/01/2019     Lab Results   Component Value Date    HCT 40.6 07/01/2019     Lab Results   Component Value Date    MCV 97 07/01/2019     Lab Results   Component Value Date     07/01/2019       Lab Results   Component Value Date    AST 23 03/22/2019     Lab Results   Component Value Date    ALT 45 03/22/2019     Lab Results   Component Value Date    BILICONJ 0.0 06/23/2009      Lab Results   Component Value Date    BILITOTAL 0.3 03/22/2019       Lab Results   Component Value Date    ALBUMIN 3.8 03/22/2019     Lab Results   Component Value Date    PROTTOTAL 8.3 03/22/2019      Lab Results   Component Value Date    ALKPHOS 161  03/22/2019       Lab Results   Component Value Date    INR 0.95 01/30/2019     US ABDOMEN COMPLETE:   6/28/2018:   1. Postoperative changes of prior splenectomy.  2. Otherwise unremarkable abdominal ultrasound.    Sonja Bueno PA-C

## 2019-07-12 DIAGNOSIS — E78.5 HYPERLIPIDEMIA LDL GOAL <100: ICD-10-CM

## 2019-07-12 LAB
CHOLEST SERPL-MCNC: 285 MG/DL
HDLC SERPL-MCNC: 54 MG/DL
LDLC SERPL CALC-MCNC: 192 MG/DL
NONHDLC SERPL-MCNC: 231 MG/DL
TRIGL SERPL-MCNC: 196 MG/DL

## 2019-07-12 PROCEDURE — 80061 LIPID PANEL: CPT | Performed by: NURSE PRACTITIONER

## 2019-07-12 PROCEDURE — 36415 COLL VENOUS BLD VENIPUNCTURE: CPT | Performed by: NURSE PRACTITIONER

## 2019-07-13 ENCOUNTER — HOSPITAL ENCOUNTER (OUTPATIENT)
Dept: ULTRASOUND IMAGING | Facility: CLINIC | Age: 34
Discharge: HOME | End: 2019-07-13
Attending: PHYSICIAN ASSISTANT | Admitting: PHYSICIAN ASSISTANT
Payer: MEDICARE

## 2019-07-13 DIAGNOSIS — K76.0 FATTY LIVER: ICD-10-CM

## 2019-07-13 DIAGNOSIS — E78.5 HYPERLIPIDEMIA LDL GOAL <100: Primary | ICD-10-CM

## 2019-07-13 PROCEDURE — 76700 US EXAM ABDOM COMPLETE: CPT

## 2019-07-13 RX ORDER — ATORVASTATIN CALCIUM 10 MG/1
10 TABLET, FILM COATED ORAL DAILY
Qty: 30 TABLET | Refills: 5 | Status: SHIPPED | OUTPATIENT
Start: 2019-07-13 | End: 2019-12-30

## 2019-07-13 NOTE — LETTER
July 13, 2019       TO: Divina Delgadillo  [address] [address] [facility] [address]       Dear Ms. Delgadillo,    We are writing to inform you of your test results.    Your abdominal ultrasound findings are consistent with fatty liver. No other concerning findings.     Keep working on healthy nutrition and regular physical activity!     You can do it!     It was a pleasure to see you at your recent visit. Please let me know if you have any questions or concerns.     Clinic Staff - [phone] option [phone]     Sincerely,     Sonja Bueno PA-C   [address] [place] [address], Mail Code [id number]  [place]  [address].

## 2019-07-15 ENCOUNTER — ALLIED HEALTH/NURSE VISIT (OUTPATIENT)
Dept: EDUCATION SERVICES | Facility: CLINIC | Age: 34
End: 2019-07-15
Payer: MEDICARE

## 2019-07-15 ENCOUNTER — TELEPHONE (OUTPATIENT)
Dept: EDUCATION SERVICES | Facility: CLINIC | Age: 34
End: 2019-07-15

## 2019-07-15 DIAGNOSIS — Z79.4 TYPE 2 DIABETES MELLITUS WITH STAGE 3 CHRONIC KIDNEY DISEASE, WITH LONG-TERM CURRENT USE OF INSULIN (H): Primary | ICD-10-CM

## 2019-07-15 DIAGNOSIS — E11.22 TYPE 2 DIABETES MELLITUS WITH STAGE 3 CHRONIC KIDNEY DISEASE, WITH LONG-TERM CURRENT USE OF INSULIN (H): Primary | ICD-10-CM

## 2019-07-15 DIAGNOSIS — N18.30 TYPE 2 DIABETES MELLITUS WITH STAGE 3 CHRONIC KIDNEY DISEASE, WITH LONG-TERM CURRENT USE OF INSULIN (H): Primary | ICD-10-CM

## 2019-07-15 PROCEDURE — G0108 DIAB MANAGE TRN  PER INDIV: HCPCS

## 2019-07-15 RX ORDER — LANCETS
EACH MISCELLANEOUS
Qty: 102 EACH | Refills: 11 | Status: SHIPPED | OUTPATIENT
Start: 2019-07-15 | End: 2020-06-11

## 2019-07-15 NOTE — LETTER
Jaleesa PIPER, KENAN Riley , RD, LD, CDE  [facility]  [address] [place] [address]    Patient: Divina Delgadillo   : 1985  Address:   [address] [address] [place] [id number]    Regarding: appeal of denial for the FreeStyle Yash Continuous Glucose Monitor         To whom it may concern:     Divina is a patient in our care for insulin controlled Type 2 diabetes, who has been denied coverage of a personal FreeStyle Yash Continuous Glucose monitoring system.  This letter is being written on Divina s behalf in attempts to have the personal FreeStyle Yash approved and covered by [facility].     We appreciate that [facility] has added the personal FreeStyle Yash to formulary making it easier for so many people to get.   We have been able witness first-hand the improved health outcomes that come along with continuous glucose monitoring and have been fortunate to prescribe it to so many [facility] patients.  In Divina s case she does not meet the Medicare Part B requirements and we are asking if you would consider covering this system as her secondary insurance.       Divina is under the care of both a Primary Physician and Diabetes Educator.  Divina has proven the ability to improve her blood sugar control and health by wearing a professional continuous glucose monitor quarterly, however this is only intermittent and blinded.   She would greatly benefit from wearing a personal continuous glucose monitor daily and receiving this live feedback at all times.  We believe this is medically necessary for Divina due to her multiple co-morbidities and health complications related to uncontrolled blood sugars.  In addition to helping preventing negative outcomes, we feel a FreeStyle Yash system could help promote numerous positive health outcomes such as:  fewer long term complications from hyperglycemia, increased safety, Hemoglobin A1c reduction, increased adherence to diet,  and activity etc.  Divina has suboptimal control while checking with finger sticks readings only.  She is motivated and has already shown significant glycemic improvement while wearing the professional continuous glucose monitor.     Please take this information into consideration for the review of Divina s coverage of a FreeStyle Yash continuous glucose monitor.       Sincerely,    Jaleesa PIPER, KENAN Riley, RD, LD, CDE  [facility]

## 2019-07-15 NOTE — PATIENT INSTRUCTIONS
Call [facility] and ask how many times in a calendar year that billing code 32130 can be completed.   Can this be done every month?     New accu chek supplies sent to the pharmacy    10% increase to

## 2019-07-15 NOTE — PROGRESS NOTES
"Diabetes Self-Management Education & Support    Diabetes Education Self Management & Training    SUBJECTIVE/OBJECTIVE:  Presents for: Follow-up  Accompanied by: Self, Other  Diabetes education in the past 24mo: Yes  Focus of Visit: Taking Medication, Healthy Coping, Healthy Eating  Diabetes type: Type 2  Disease course: Stable  Transportation concerns: Yes  Other concerns:: None  Cultural Influences/Ethnic Background:  American     Diabetes Symptoms & Complications  Fatigue: No  Symptom course: Resolved  Weight trend: Stable       Patient Problem List and Family Medical History reviewed for relevant medical history, current medical status, and diabetes risk factors.    Vitals:  There were no vitals taken for this visit.  Estimated body mass index is 31.93 kg/m  as calculated from the following:    Height as of 7/3/19: 1.626 m (5' 4\").    Weight as of 7/16/19: 84.4 kg (186 lb).   Last 3 BP:   BP Readings from Last 3 Encounters:   07/16/19 118/63   07/11/19 136/77   07/03/19 108/62       History   Smoking Status     Current Every Day Smoker     Packs/day: 0.75     Years: 4.00     Types: Cigarettes     Last attempt to quit: 6/26/2019   Smokeless Tobacco     Never Used     Comment: quit 1 week ago, recommended Nicotine replacement with nicotine inhalers        Labs:  Lab Results   Component Value Date    A1C 7.7 07/03/2019     Lab Results   Component Value Date     07/16/2019     Lab Results   Component Value Date     07/12/2019     HDL Cholesterol   Date Value Ref Range Status   07/12/2019 54 >49 mg/dL Final   ]  GFR Estimate   Date Value Ref Range Status   07/16/2019 49 (L) >60 mL/min/[1.73_m2] Final     Comment:     Non  GFR Calc  Starting 12/18/2018, serum creatinine based estimated GFR (eGFR) will be   calculated using the Chronic Kidney Disease Epidemiology Collaboration   (CKD-EPI) equation.       GFR Estimate If Black   Date Value Ref Range Status   07/16/2019 56 (L) >60 " mL/min/[1.73_m2] Final     Comment:      GFR Calc  Starting 2018, serum creatinine based estimated GFR (eGFR) will be   calculated using the Chronic Kidney Disease Epidemiology Collaboration   (CKD-EPI) equation.       Lab Results   Component Value Date    CR 1.41 2019     No results found for: MICROALBUMIN    Healthy Eating  Healthy Eating Assessed Today: No  Meal planning: Smaller portions(Varies between portion control and avoiding sweets to no meal planning)  Meals include: Breakfast, Lunch, Dinner, Snacks  Snacks: buying some snacks from the vending machine again at work  Beverages: Water, Diet soda, Sports drinks(reports adding in some juice again)  Has patient met with a dietitian in the past?: Yes    Being Active  Being Active Assessed Today: Yes  Exercise:: Currently not exercising  Barrier to exercise: Access, Other(needs a ride to the gym)    Monitoring  Monitoring Assessed Today: Yes  Did patient bring glucose meter to appointment? : Yes  Blood Glucose Meter: Accu-check  Home Glucose (Sugar) Monitoring: 3-4 times per day  Low Glucose Range (mg/dL):   High Glucose Range (mg/dL): >200  Overall Range (mg/dL): 140-180    Fastin, 139, 144, 118, 122, 125, 127, 86, 122, 151, 169, 120, 125, 131, 134  After lunch: 181, 145, 145, 158, 133, 107, 160, 214  Before dinner: 133, 112, 117, 130, 121  After dinner: 146, 176, 159, 153, 174, 129     Taking Medications  Diabetes Medication(s)     Insulin       insulin glargine (LANTUS SOLOSTAR PEN) 100 UNIT/ML pen    Inject 19 units once daily   (allow max dose 25 units/day for titration)    Incretin Mimetic Agents (GLP-1 Receptor Agonists)       semaglutide (OZEMPIC) 1 MG/DOSE pen    Inject 1 mg Subcutaneous every 7 days     Patient taking differently:  Inject 1 mg Subcutaneous every 7 days On         Taking Medication Assessed Today: Yes  Current Treatments: Insulin Injections, Non-insulin Injectables  Problems taking  diabetes medications regularly?: No  Diabetes medication side effects?: No  Treatment Compliance: All of the time    Problem Solving  Problem Solving Assessed Today: Yes  Hypoglycemia Frequency: Rarely(not recently)  Patient carries a carbohydrate source: Yes    Hypoglycemia symptoms  Tremors: Yes      Reducing Risks  Reducing Risks Assessed Today: No    Healthy Coping  Healthy Coping Assessed Today: Yes  Emotional response to diabetes: Ready to learn  Informal Support system:: Friends, Family, Other  Stage of change: ACTION (Actively working towards change)(varies between preparation and action)  Support resources: Websites, In-person Offerings  Patient Activation Measure Survey Score:  EMY Score (Last Two) 5/10/2018   EMY Raw Score 35   Activation Score 72.1   EMY Level 3     Patient's most recent   Lab Results   Component Value Date    A1C 7.7 07/03/2019    is not meeting goal of <7.0    ASSESSMENT/INTERVENTION:   Patient reports falling off track over the last month.  Is increasing telephone visits with health  Mahin. Is going to make an exercise calendar again.  Reviewed benefits of good blood sugar control and risks of uncontrolled blood sugars.  Many of the fluctuations can be controlled via food.   Divina does better when wearing a continuous glucose monitor; will see if secondary insurance [facility] can be appealed to cover a personal version, otherwise recommend patient / guardian call insurance to see how frequently billing code 62560 can be billed.      Diabetes knowledge and skills assessment:   Patient is knowledgeable in diabetes management concepts related to: Healthy Eating, Being Active, Monitoring, Taking Medication, Problem Solving, Reducing Risks and Healthy Coping  Patient needs further education on the following diabetes management concepts: Reducing Risks and Healthy Coping  Based on learning assessment above, most appropriate setting for further diabetes education would be: Individual  setting.    Education provided today on:  AADE Self-Care Behaviors:  Healthy Eating: portion control  Being Active: relationship to blood glucose  Monitoring: individual blood glucose targets  Taking Medication: action of prescribed medication and side effects of prescribed medications  Reducing Risks: major complications of diabetes and A1C - goals, relating to blood glucose levels, how often to check    Opportunities for ongoing education and support in diabetes-self management were discussed.  Pt verbalized understanding of concepts discussed and recommendations provided today.       Education Materials Provided:  No new materials provided today    PLAN:  See Patient Instructions for co-developed, patient-stated behavior change goals.  AVS printed and provided to patient today. See Follow-Up section for recommended follow-up.    Dolly Riley RD, LD, CDE  Diabetes Education    Time Spent: 30 minutes  Encounter Type: Individual

## 2019-07-15 NOTE — TELEPHONE ENCOUNTER
Ryan Lazcano,     May I put through another prescription for a personal FreeStyle Yash?  Lindsay AQUINO will likely deny it again as Medicare part B is the primary payor, but I want to see if there is a way to do an appeal and letter of medical necessity.     Thanks!  Katiana Riley RD, LD, CDE  Diabetes Education

## 2019-07-16 ENCOUNTER — TRANSFERRED RECORDS (OUTPATIENT)
Dept: HEALTH INFORMATION MANAGEMENT | Facility: CLINIC | Age: 34
End: 2019-07-16

## 2019-07-16 ENCOUNTER — OFFICE VISIT (OUTPATIENT)
Dept: NEPHROLOGY | Facility: CLINIC | Age: 34
End: 2019-07-16
Payer: MEDICARE

## 2019-07-16 VITALS
DIASTOLIC BLOOD PRESSURE: 63 MMHG | HEART RATE: 79 BPM | OXYGEN SATURATION: 98 % | SYSTOLIC BLOOD PRESSURE: 118 MMHG | BODY MASS INDEX: 31.93 KG/M2 | WEIGHT: 186 LBS

## 2019-07-16 DIAGNOSIS — F31.9 BIPOLAR AFFECTIVE DISORDER, REMISSION STATUS UNSPECIFIED (H): ICD-10-CM

## 2019-07-16 DIAGNOSIS — N25.81 SECONDARY RENAL HYPERPARATHYROIDISM (H): Primary | ICD-10-CM

## 2019-07-16 DIAGNOSIS — K21.9 GASTROESOPHAGEAL REFLUX DISEASE WITHOUT ESOPHAGITIS: ICD-10-CM

## 2019-07-16 DIAGNOSIS — N18.30 TYPE 2 DIABETES MELLITUS WITH STAGE 3 CHRONIC KIDNEY DISEASE, WITH LONG-TERM CURRENT USE OF INSULIN (H): ICD-10-CM

## 2019-07-16 DIAGNOSIS — I10 ESSENTIAL HYPERTENSION: ICD-10-CM

## 2019-07-16 DIAGNOSIS — N18.30 CKD (CHRONIC KIDNEY DISEASE) STAGE 3, GFR 30-59 ML/MIN (H): ICD-10-CM

## 2019-07-16 DIAGNOSIS — E11.22 TYPE 2 DIABETES MELLITUS WITH STAGE 3 CHRONIC KIDNEY DISEASE, WITH LONG-TERM CURRENT USE OF INSULIN (H): ICD-10-CM

## 2019-07-16 DIAGNOSIS — K76.0 FATTY LIVER: ICD-10-CM

## 2019-07-16 DIAGNOSIS — Z79.4 TYPE 2 DIABETES MELLITUS WITH STAGE 3 CHRONIC KIDNEY DISEASE, WITH LONG-TERM CURRENT USE OF INSULIN (H): ICD-10-CM

## 2019-07-16 LAB
ALBUMIN SERPL-MCNC: 3.6 G/DL (ref 3.4–5)
ALP SERPL-CCNC: 185 U/L (ref 40–150)
ALT SERPL W P-5'-P-CCNC: 55 U/L (ref 0–50)
ANION GAP SERPL CALCULATED.3IONS-SCNC: 2 MMOL/L (ref 3–14)
AST SERPL W P-5'-P-CCNC: 29 U/L (ref 0–45)
BILIRUB DIRECT SERPL-MCNC: <0.1 MG/DL (ref 0–0.2)
BILIRUB SERPL-MCNC: 0.3 MG/DL (ref 0.2–1.3)
BUN SERPL-MCNC: 27 MG/DL (ref 7–30)
CALCIUM SERPL-MCNC: 9.5 MG/DL (ref 8.5–10.1)
CHLORIDE SERPL-SCNC: 108 MMOL/L (ref 94–109)
CO2 SERPL-SCNC: 29 MMOL/L (ref 20–32)
CREAT SERPL-MCNC: 1.41 MG/DL (ref 0.52–1.04)
ERYTHROCYTE [DISTWIDTH] IN BLOOD BY AUTOMATED COUNT: 13.8 % (ref 10–15)
GFR SERPL CREATININE-BSD FRML MDRD: 49 ML/MIN/{1.73_M2}
GLUCOSE SERPL-MCNC: 124 MG/DL (ref 70–99)
HCT VFR BLD AUTO: 42.5 % (ref 35–47)
HGB BLD-MCNC: 13.6 G/DL (ref 11.7–15.7)
INR PPP: 0.9 (ref 0.86–1.14)
MCH RBC QN AUTO: 30.5 PG (ref 26.5–33)
MCHC RBC AUTO-ENTMCNC: 32 G/DL (ref 31.5–36.5)
MCV RBC AUTO: 95 FL (ref 78–100)
PHOSPHATE SERPL-MCNC: 3.3 MG/DL (ref 2.5–4.5)
PLATELET # BLD AUTO: 440 10E9/L (ref 150–450)
POTASSIUM SERPL-SCNC: 4.7 MMOL/L (ref 3.4–5.3)
PROT SERPL-MCNC: 8.3 G/DL (ref 6.8–8.8)
PTH-INTACT SERPL-MCNC: 39 PG/ML (ref 18–80)
RBC # BLD AUTO: 4.46 10E12/L (ref 3.8–5.2)
RETINOPATHY: NEGATIVE
SODIUM SERPL-SCNC: 139 MMOL/L (ref 133–144)
WBC # BLD AUTO: 12.4 10E9/L (ref 4–11)

## 2019-07-16 PROCEDURE — 85610 PROTHROMBIN TIME: CPT | Performed by: INTERNAL MEDICINE

## 2019-07-16 PROCEDURE — 84075 ASSAY ALKALINE PHOSPHATASE: CPT | Performed by: INTERNAL MEDICINE

## 2019-07-16 PROCEDURE — 82306 VITAMIN D 25 HYDROXY: CPT | Performed by: INTERNAL MEDICINE

## 2019-07-16 PROCEDURE — 82248 BILIRUBIN DIRECT: CPT | Performed by: INTERNAL MEDICINE

## 2019-07-16 PROCEDURE — 82247 BILIRUBIN TOTAL: CPT | Performed by: INTERNAL MEDICINE

## 2019-07-16 PROCEDURE — 80069 RENAL FUNCTION PANEL: CPT | Performed by: INTERNAL MEDICINE

## 2019-07-16 PROCEDURE — 84450 TRANSFERASE (AST) (SGOT): CPT | Performed by: INTERNAL MEDICINE

## 2019-07-16 PROCEDURE — 84460 ALANINE AMINO (ALT) (SGPT): CPT | Performed by: INTERNAL MEDICINE

## 2019-07-16 PROCEDURE — 83516 IMMUNOASSAY NONANTIBODY: CPT | Performed by: INTERNAL MEDICINE

## 2019-07-16 PROCEDURE — 84155 ASSAY OF PROTEIN SERUM: CPT | Performed by: INTERNAL MEDICINE

## 2019-07-16 PROCEDURE — 85027 COMPLETE CBC AUTOMATED: CPT | Performed by: INTERNAL MEDICINE

## 2019-07-16 PROCEDURE — 36415 COLL VENOUS BLD VENIPUNCTURE: CPT | Performed by: INTERNAL MEDICINE

## 2019-07-16 PROCEDURE — 99214 OFFICE O/P EST MOD 30 MIN: CPT | Performed by: INTERNAL MEDICINE

## 2019-07-16 PROCEDURE — 83970 ASSAY OF PARATHORMONE: CPT | Performed by: INTERNAL MEDICINE

## 2019-07-16 ASSESSMENT — PAIN SCALES - GENERAL: PAINLEVEL: NO PAIN (0)

## 2019-07-16 NOTE — NURSING NOTE
Divina Delgadillo's goals for this visit include:   Chief Complaint   Patient presents with     RECHECK     6mo recheck CKD       She requests these members of her care team be copied on today's visit information: no    PCP: Jaleesa Velasquez    Referring Provider:  No referring provider defined for this encounter.    /63 (BP Location: Right arm, Patient Position: Sitting, Cuff Size: Adult Large)   Pulse 79   Wt 84.4 kg (186 lb)   SpO2 98%   BMI 31.93 kg/m      Do you need any medication refills at today's visit? Yes    Dionne Fuller LPN

## 2019-07-16 NOTE — PROGRESS NOTES
07/16/19     CC: CKD Stage 3, HTN    HPI: Divina Delgadillo is a 34 year old female who presents for follow-up of CKD. To review, her hx is significant for diabetes (~ 20 years), bipolar disease - was on lithium therapy for around 6 years but since our initial visit, she has since been taken off of it. Her diabetes was previously very poorly controlled (up to 10% in Jan) improved to 7.2% in June, now up to 9% in Nov. Creatinine has been 1.31-2.35 in the past year; stable at 1.34 today. Her last UPCR was 0.21 g/g in July - she is taking 5 mg of lisinopril currently.    Divina presents for routine follow-up today.  Her creatinine has been 1.23-3.47 in the past year.  Her baseline seems to be someplace between 1.2 and 1.6 and she is stable at 1.41 at this time.  At her last visit she was very motivated to exercise and have been attending the gym regularly.  Unfortunately she has gotten away from the exercise to some degree but is still motivated to continue to make healthy lifestyle changes.  Her blood pressure is well controlled and she has no swelling concerns.  She is not using NSAIDs.       Allergies   Allergen Reactions     Acetaminophen Other (See Comments)     pt previously tried to overdose on it, PMD does not want pt taking per pt.      Latex Rash         Current Outpatient Medications on File Prior to Visit:  amLODIPine (NORVASC) 5 MG tablet Take 2 tablets (10 mg) by mouth daily (Patient taking differently: Take 5 mg by mouth daily )   ARIPiprazole (ABILIFY) 30 MG tablet Take 30 mg by mouth daily   atorvastatin (LIPITOR) 10 MG tablet Take 1 tablet (10 mg) by mouth daily   blood glucose (ACCU-CHEK GUIDE) test strip Use to test blood sugar 3 times daily (accu chek GUIDE)   blood glucose monitoring (ACCU-CHEK FASTCLIX) lancets Use to test blood sugar 3 times daily   Blood Glucose Monitoring Suppl (ACCU-CHEK GUIDE) w/Device KIT 1 Device daily   carvedilol (COREG) 6.25 MG tablet Increase Coreg to 12.5 mg AM and  continue 6.25 mg PM   Clozapine (CLOZARIL) 200 MG tablet Take 200 mg by mouth 2 times daily   FLUoxetine (PROZAC) 20 MG capsule Take 20 mg by mouth daily   insulin glargine (LANTUS SOLOSTAR PEN) 100 UNIT/ML pen Inject 19 units once daily   (allow max dose 25 units/day for titration)   lamoTRIgine (LAMICTAL) 25 MG tablet 100 mg at bedtime   lisinopril (PRINIVIL/ZESTRIL) 10 MG tablet Take 0.5 tablets (5 mg) by mouth daily   loratadine (CLARITIN) 10 MG tablet Take 1 tablet (10 mg) by mouth every morning   montelukast (SINGULAIR) 10 MG tablet Take 1 tablet (10 mg) by mouth At Bedtime   omeprazole (PRILOSEC) 20 MG CR capsule Take 1 capsule (20 mg) by mouth daily   ranitidine (ZANTAC) 300 MG tablet Take 1 tablet (300 mg) by mouth At Bedtime   semaglutide (OZEMPIC) 1 MG/DOSE pen Inject 1 mg Subcutaneous every 7 days (Patient taking differently: Inject 1 mg Subcutaneous every 7 days On thursdays)   ULTICARE MINI 31G X 6 MM insulin pen needle Use 1 pen needles daily   ULTILET SHARPS CONTAINER 1QT MISC 1 Device every 30 days   albuterol (PROAIR HFA/PROVENTIL HFA/VENTOLIN HFA) 108 (90 BASE) MCG/ACT Inhaler Inhale 2 puffs into the lungs every 6 hours as needed for shortness of breath / dyspnea or wheezing (Patient not taking: Reported on 4/15/2019)   nicotine (NICOTROL) 10 MG inhaler Inhale 6-10 Cartridges into the lungs daily as needed for smoking cessation (Patient not taking: Reported on 7/16/2019)     No current facility-administered medications on file prior to visit.     Past Medical History:   Diagnosis Date     Cervical high risk HPV (human papillomavirus) test positive 6/20/2017     Depressive disorder, not elsewhere classified      DVT (deep venous thrombosis) (H)      Dysmetabolic syndrome X      Esophageal reflux     GERD     Mild intermittent asthma      Other abnormal heart sounds     Heart murmur     Other specified types of schizophrenia, unspecified condition      Pancreatic cancer (H)     left adrenal gland,  partial pancreas, and spleen removed; no chemo or radiation.      Type II or unspecified type diabetes mellitus without mention of complication, not stated as uncontrolled      Unspecified disorder of tympanic membrane        Past Surgical History:   Procedure Laterality Date     ADRENALECTOMY       PANCREATECTOMY PARTIAL       SPLENECTOMY       SURGICAL HISTORY OF -   10/1/2000    Tympanoplasty     SURGICAL HISTORY OF -   10/1/2000    Tonsillectomy       Social History     Tobacco Use     Smoking status: Current Every Day Smoker     Packs/day: 0.75     Years: 4.00     Pack years: 3.00     Types: Cigarettes     Last attempt to quit: 2019     Years since quittin.0     Smokeless tobacco: Never Used     Tobacco comment: quit 1 week ago, recommended Nicotine replacement with nicotine inhalers    Substance Use Topics     Alcohol use: No     Drug use: No       Family History   Problem Relation Age of Onset     Respiratory Mother         ASTHMA     Allergies Mother      Depression Mother      Heart Disease Father         HEART VALVE REPLACEMENT     Hypertension Father      Diabetes Father      Depression Father      Respiratory Brother      Allergies Brother      Respiratory Sister      Allergies Sister      Depression Sister      Respiratory Sister      Allergies Sister      Depression Sister      Kidney Disease No family hx of        ROS: A 4 system review of systems was negative other than noted here or above.     Exam:  /63 (BP Location: Right arm, Patient Position: Sitting, Cuff Size: Adult Large)   Pulse 79   Wt 84.4 kg (186 lb)   SpO2 98%   BMI 31.93 kg/m      GENERAL APPEARANCE: alert and no distress  NECK: supple, no adenopathy  RESP: lungs clear to auscultation   CV: regular rhythm, normal rate, no rub, no edema.   Extremities: no clubbing, cyanosis  SKIN: no rash  NEURO: mentation intact and speech normal  PSYCH: affect normal/bright    Results:   Orders Only on 2019   Component Date  Value Ref Range Status     Sodium 07/16/2019 139  133 - 144 mmol/L Final     Potassium 07/16/2019 4.7  3.4 - 5.3 mmol/L Final     Chloride 07/16/2019 108  94 - 109 mmol/L Final     Carbon Dioxide 07/16/2019 29  20 - 32 mmol/L Final     Anion Gap 07/16/2019 2* 3 - 14 mmol/L Final     Glucose 07/16/2019 124* 70 - 99 mg/dL Final    Fasting specimen     Urea Nitrogen 07/16/2019 27  7 - 30 mg/dL Final     Creatinine 07/16/2019 1.41* 0.52 - 1.04 mg/dL Final     GFR Estimate 07/16/2019 49* >60 mL/min/[1.73_m2] Final    Comment: Non  GFR Calc  Starting 12/18/2018, serum creatinine based estimated GFR (eGFR) will be   calculated using the Chronic Kidney Disease Epidemiology Collaboration   (CKD-EPI) equation.       GFR Estimate If Black 07/16/2019 56* >60 mL/min/[1.73_m2] Final    Comment:  GFR Calc  Starting 12/18/2018, serum creatinine based estimated GFR (eGFR) will be   calculated using the Chronic Kidney Disease Epidemiology Collaboration   (CKD-EPI) equation.       Calcium 07/16/2019 9.5  8.5 - 10.1 mg/dL Final     Phosphorus 07/16/2019 3.3  2.5 - 4.5 mg/dL Final     Albumin 07/16/2019 3.6  3.4 - 5.0 g/dL Final     WBC 07/16/2019 12.4* 4.0 - 11.0 10e9/L Final     RBC Count 07/16/2019 4.46  3.8 - 5.2 10e12/L Final     Hemoglobin 07/16/2019 13.6  11.7 - 15.7 g/dL Final     Hematocrit 07/16/2019 42.5  35.0 - 47.0 % Final     MCV 07/16/2019 95  78 - 100 fl Final     MCH 07/16/2019 30.5  26.5 - 33.0 pg Final     MCHC 07/16/2019 32.0  31.5 - 36.5 g/dL Final     RDW 07/16/2019 13.8  10.0 - 15.0 % Final     Platelet Count 07/16/2019 440  150 - 450 10e9/L Final     INR 07/16/2019 0.90  0.86 - 1.14 Final     Alkaline Phosphatase 07/16/2019 185* 40 - 150 U/L Final     ALT 07/16/2019 55* 0 - 50 U/L Final     AST 07/16/2019 29  0 - 45 U/L Final     Bilirubin Total 07/16/2019 0.3  0.2 - 1.3 mg/dL Final     Bilirubin Direct 07/16/2019 <0.1  0.0 - 0.2 mg/dL Final     Protein Total 07/16/2019 8.3  6.8 -  8.8 g/dL Final         Assessment/Plan:   1. CKD Stage 3: risk factors for kidney disease include longstanding hypertension, diabetes, as well as longterm lithium use. She is now away from lithium which is great to see given she is already at risk for diabetic nephropathy and would like to minimize risk.  She is on a low-dose of lisinopril.  I have been hesitant to increase the dose too far given her hemodynamic variability seen in the past as well as potassium being 4.7.  We will plan routine follow-up for her kidney disease.    2. DM: Her A1c at this time is 7.7% which is improved from greater than 10% in January 2018.  I congratulated on her on the improvement she has seen and encouraged ongoing good diabetes control.    3. Bipolar: now off of lithium and stable per her report.     4. GERD: ideally would avoid PPI therapy given increased risk of incident CKD and AIN noted with PPI therapy. Tolerating once daily dosing - I am not certain we will be able to get away from PPI completely given the severity of her sxs previously.     5. Hypertension: Blood pressure is at goal of less than 130/80.  No changes are made today.    Patient Instructions   Baseline creatinine ~ 1.3-1.6.   Plan kidney labs in 6 months and follow-up in one year     Sánchez Dias,

## 2019-07-17 ENCOUNTER — PATIENT OUTREACH (OUTPATIENT)
Dept: NURSING | Facility: CLINIC | Age: 34
End: 2019-07-17

## 2019-07-17 DIAGNOSIS — E11.22 TYPE 2 DIABETES MELLITUS WITH STAGE 3 CHRONIC KIDNEY DISEASE, WITH LONG-TERM CURRENT USE OF INSULIN (H): Primary | ICD-10-CM

## 2019-07-17 DIAGNOSIS — N18.30 TYPE 2 DIABETES MELLITUS WITH STAGE 3 CHRONIC KIDNEY DISEASE, WITH LONG-TERM CURRENT USE OF INSULIN (H): Primary | ICD-10-CM

## 2019-07-17 DIAGNOSIS — Z79.4 TYPE 2 DIABETES MELLITUS WITH STAGE 3 CHRONIC KIDNEY DISEASE, WITH LONG-TERM CURRENT USE OF INSULIN (H): Primary | ICD-10-CM

## 2019-07-17 LAB — MITOCHONDRIA M2 IGG SER-ACNC: <1 U/ML

## 2019-07-17 PROCEDURE — 99207 ZZC HEALTH COACHING, NO CHARGE: CPT

## 2019-07-17 NOTE — PROGRESS NOTES
"July 17, 2019    Health Coaching Progress Note    Patient Name: Divina Delgadillo Date: July 17, 2019      Session Length: 30      DATA    PRM Master Survey Scores Reviewed: Yes    Core Healthy Days Survey:         EMY Score (Last Two) 5/10/2018   EMY Raw Score 35   Activation Score 72.1   EMY Level 3       PHQ-2 Score 4/4/2019 2/14/2019   PHQ-2 Total Score (Adult) - Positive if 3 or more points; Administer PHQ-9 if positive 0 1       PHQ-9 SCORE 11/27/2018 2/14/2019   PHQ-9 Total Score 16 12       Treatment Objective(s) Addressed in This Session:  Target Behavior(s): smoking and disease management/lifestyle changes increasing exercise    Current Stressors / Issues:  Divina shares today that she is having a tough week. Divina is still smoking but is currently out of cigarettes. We discuss this further today during our visit.    What Patient Does Well:   Divina is still smoking just 4 cigarettes per day but doesn't feel ready to quit quite yet.  Previous Successes:   Divina got a nicotrol inhaler and has tried it. Patient found that it burned the back of her throat and writer explains the inhaler is meant to be \"puffed\" like an inhaler (patient also uses an inhaler and so verbalizes understanding of this) not inhaled like a drag off of a cigarette. Divina states she will try using it again with this in mind.  Areas in Need of Improvement:   Divina reports stress management is the area in need at this time and also planning for exercise (for her health and also stress management). Divina plans to set up transportation through [employer] to go to the gym after she gets home from work on Mondays, Wednesdays, and also Fridays. We will follow up on having rides set up for this next visit. Writer notes this for next call.  Barriers to Change:   Divina is going through a stressful time at this time.  Reasons for Change:   Divina wants to make changes for her health and self-esteem.  Plan/Goal for the Next 4 Weeks:   Goals Addressed " as of 7/17/2019 at 3:33 PM                 Today    7/9/19      Being Active (pt-stated)   90%  On track    Added 2/13/19 by Mahin Penny     My Goal: I will continue exercising M-W-F for 30 minutes each day    What I need to meet my goal: Set up rides to gym through [employer] for M-W-F after work (2:30)     I plan to meet my goal by this date: Next Coaching Session        Reducing Risks (pt-stated)   On track  90%    Added 12/5/18 by Mahin Penny     My Goal: I will begin cutting back on cigarette smoking (1ppd--->12 per day----> 5 per day now as of 6/11/19--->4 per day now 7/9/19) and continue working toward complete tobacco cessation    What I need to meet my goal: Nothing needed at this time, got nicotrol inhaler    I plan to meet my goal by this date: Next Coaching Session    Divina has cut back to 4!   #1-waking  #2 (AM) and #3 (lunch) at work  #4 arrival home                Intervention:  Motivational Interviewing    MI Intervention: Expressed Empathy/Understanding, Supported Autonomy, Collaboration, Evocation, Permission to raise concern or advise, Open-ended questions, Reflections: simple and complex, Change talk (evoked) and Reframe     Change Talk Expressed by the Patient: Desire to change Ability to change Reasons to change Committment to change Activation Taking steps    Provider Response to Change Talk: E - Evoked more info from patient about behavior change, A - Affirmed patient's thoughts, decisions, or attempts at behavior change, R - Reflected patient's change talk and S - Summarized patient's change talk statements    Assessment / Progress on Treatment Objective(s) / Homework:    Satisfactory progress - ACTION (Actively working towards change); Intervened by reinforcing change plan / affirming steps taken     Plan: (Homework, other):  Patient was encouraged to continue to seek condition-related information and education, as well as schedule a follow up appointment with the Health  next week.  Patient has set self-identified goals and will monitor progress until the next appointment.  Next visit scheduled for July 23 at 3:30PM.      Mahin Penny, Health   7/17/2019    3:34 PM

## 2019-07-19 ENCOUNTER — TELEPHONE (OUTPATIENT)
Dept: GASTROENTEROLOGY | Facility: CLINIC | Age: 34
End: 2019-07-19

## 2019-07-20 DIAGNOSIS — Z79.4 TYPE 2 DIABETES MELLITUS WITH STAGE 3 CHRONIC KIDNEY DISEASE, WITH LONG-TERM CURRENT USE OF INSULIN (H): ICD-10-CM

## 2019-07-20 DIAGNOSIS — N18.30 TYPE 2 DIABETES MELLITUS WITH STAGE 3 CHRONIC KIDNEY DISEASE, WITH LONG-TERM CURRENT USE OF INSULIN (H): ICD-10-CM

## 2019-07-20 DIAGNOSIS — E11.22 TYPE 2 DIABETES MELLITUS WITH STAGE 3 CHRONIC KIDNEY DISEASE, WITH LONG-TERM CURRENT USE OF INSULIN (H): ICD-10-CM

## 2019-07-22 LAB
DEPRECATED CALCIDIOL+CALCIFEROL SERPL-MC: <61 UG/L (ref 20–75)
VITAMIN D2 SERPL-MCNC: <5 UG/L
VITAMIN D3 SERPL-MCNC: 56 UG/L

## 2019-07-22 NOTE — TELEPHONE ENCOUNTER
Routing refill request to provider for review/approval because:  Drug not on the [facility] refill protocol-unsure why?  Associated Diagnoses   Type 2 diabetes mellitus with stage 3 chronic kidney disease, with long-term current use of insulin (H) [E11.22, N18.3, Z79.4]  - Primary         Last script only for 90 days total, not for year.     GABI MixonN, RN

## 2019-07-22 NOTE — TELEPHONE ENCOUNTER
Requested Prescriptions   Pending Prescriptions Disp Refills     OZEMPIC 1 MG/DOSE pen [Pharmacy Med Name: OZEMPIC 1MG/0.75ML PEN INJCTR] 3 mL 3     Sig: Inject 1 mg Subcutaneous every 7 days       There is no refill protocol information for this order        Last Written Prescription Date:  2/22/2019  Last Fill Quantity: 3ml,   # refills: 3  Last Office Visit: 7/3/2019 Eva   Future Office visit:    Next 5 appointments (look out 90 days)    Jul 23, 2019  3:30 PM CDT  [facility]phone [facility] with Mahin Penny  [facility] [address] [facility] [address]  [phone]           Routing refill request to provider for review/approval because:  Drug not on the [facility], [facility] or [facility] refill protocol or controlled substance

## 2019-07-23 ENCOUNTER — PATIENT OUTREACH (OUTPATIENT)
Dept: NURSING | Facility: CLINIC | Age: 34
End: 2019-07-23

## 2019-07-23 DIAGNOSIS — Z79.4 TYPE 2 DIABETES MELLITUS WITH STAGE 3 CHRONIC KIDNEY DISEASE, WITH LONG-TERM CURRENT USE OF INSULIN (H): Primary | ICD-10-CM

## 2019-07-23 DIAGNOSIS — N18.30 TYPE 2 DIABETES MELLITUS WITH STAGE 3 CHRONIC KIDNEY DISEASE, WITH LONG-TERM CURRENT USE OF INSULIN (H): Primary | ICD-10-CM

## 2019-07-23 DIAGNOSIS — E11.22 TYPE 2 DIABETES MELLITUS WITH STAGE 3 CHRONIC KIDNEY DISEASE, WITH LONG-TERM CURRENT USE OF INSULIN (H): Primary | ICD-10-CM

## 2019-07-23 PROCEDURE — 99207 ZZC HEALTH COACHING, NO CHARGE: CPT

## 2019-07-23 NOTE — PROGRESS NOTES
July 23, 2019    Health Coaching Progress Note    Patient Name: Divina Delgadillo Date: July 23, 2019      Session Length: 30      DATA    PRM Master Survey Scores Reviewed: Yes    Core Healthy Days Survey:         EMY Score (Last Two) 5/10/2018   EMY Raw Score 35   Activation Score 72.1   EMY Level 3       PHQ-2 Score 4/4/2019 2/14/2019   PHQ-2 Total Score (Adult) - Positive if 3 or more points; Administer PHQ-9 if positive 0 1       PHQ-9 SCORE 11/27/2018 2/14/2019   PHQ-9 Total Score 16 12       Treatment Objective(s) Addressed in This Session:  Target Behavior(s): smoking and disease management/lifestyle changes increasing exercises, stress management    Current Stressors / Issues:  Divina shares today that she was recently hospitalized as a result of cutting on her leg.    What Patient Does Well:   Divina is open and honest with writer today.  Previous Successes:   Divina had set up rides to the gym as we had discussed last visit but explains that her foster mom then cancelled them for some reason.  Areas in Need of Improvement:   Divina says the area in need of improvement at this time is continuing to work with her counselor for support and also having them help her work on a communication plan for herself and her foster mom, Lorin. Writer affirms this is a good plan and makes note to follow up on this next visit.  Barriers to Change:   1) Divina's health is complex - trying to manage BG, quit smoking, exercise more, kidney health concerns, etc.  Reasons for Change:   Divina wants to make changes for her health and self-esteem.  Plan/Goal for the Next 4 Weeks:   Goals Addressed as of 7/23/2019 at 5:12 PM                 Today    7/17/19      Being Active (pt-stated)   50%  90%    Added 2/13/19 by Mahin Penny     My Goal: I will continue exercising M-W-F for 30 minutes each day    What I need to meet my goal: Set up rides to gym through [employer] for M-W-F after work (2:30)     I plan to meet my goal by this date:  Next Coaching Session        Reducing Risks (pt-stated)   On track  On track    Added 12/5/18 by Mahin Penny     My Goal: I will begin cutting back on cigarette smoking (1ppd--->12 per day----> 5 per day now as of 6/11/19--->4 per day now 7/9/19) and continue working toward complete tobacco cessation    What I need to meet my goal: Nothing needed at this time, got nicotrol inhaler    I plan to meet my goal by this date: Next Coaching Session    Divina has cut back to 4!   #1-waking  #2 (AM) and #3 (lunch) at work  #4 arrival home                Intervention:  Motivational Interviewing    MI Intervention: Expressed Empathy/Understanding, Supported Autonomy, Collaboration, Evocation, Permission to raise concern or advise, Open-ended questions, Reflections: simple and complex, Change talk (evoked) and Reframe     Change Talk Expressed by the Patient: Desire to change Ability to change Reasons to change Need to change Committment to change Activation Taking steps    Provider Response to Change Talk: E - Evoked more info from patient about behavior change, A - Affirmed patient's thoughts, decisions, or attempts at behavior change, R - Reflected patient's change talk and S - Summarized patient's change talk statements    Assessment / Progress on Treatment Objective(s) / Homework:    No improvement - ACTION (Actively working towards change); Intervened by reinforcing change plan / affirming steps taken     Plan: (Homework, other):  Patient was encouraged to continue to seek condition-related information and education, as well as schedule a follow up appointment with the Health  next week. Patient has set self-identified goals and will monitor progress until the next appointment.  Next visit scheduled for July 31 at 3PM.      Mahin Penny, Health   7/24/2019    4:01 PM

## 2019-07-29 RX ORDER — FLASH GLUCOSE SCANNING READER
1 EACH MISCELLANEOUS DAILY
Qty: 1 DEVICE | Refills: 2 | Status: SHIPPED | OUTPATIENT
Start: 2019-07-29 | End: 2019-10-10 | Stop reason: ALTCHOICE

## 2019-07-29 RX ORDER — FLASH GLUCOSE SENSOR
1 KIT MISCELLANEOUS
Qty: 2 EACH | Refills: 12 | Status: SHIPPED | OUTPATIENT
Start: 2019-07-29 | End: 2019-10-10 | Stop reason: ALTCHOICE

## 2019-07-29 NOTE — TELEPHONE ENCOUNTER
Patient has Medicare & [facility].  [facility] normally covers FreeStyle Yash, however since they are patient's secondary insurance it is likely that they will decline it and send it to Medicare.  Patient does NOT meet part Medicare part B requirements for a FreeStyle Yash system.  Plan is to try to appeal [facility] with a letter of medical necessity to see if they will cover it through pharmacy benefits.     Dolly Riley RD, LD, CDE  Diabetes Education

## 2019-07-30 NOTE — TELEPHONE ENCOUNTER
Prior Authorization Retail Medication Request    Medication/Dose: Freestyle Yash  ICD code (if different than what is on RX):    Previously Tried and Failed:   Rationale:  Patient has used previously with success  Patient has Medicare & [facility].  [facility] normally covers FreeStyle Yash, however since they are patient's secondary insurance it is likely that they will decline it and send it to Medicare.  Patient does NOT meet part Medicare part B requirements for a FreeStyle Yash system.  Plan is to try to appeal [facility] with a letter of medical necessity to see if they will cover it through pharmacy benefits.      Dolly Riley RD, LD, CDE  Diabetes Education       Insurance Name:  [facility]  Insurance ID:  [id number]      Pharmacy Information (if different than what is on RX)  Name:    Phone:

## 2019-07-30 NOTE — TELEPHONE ENCOUNTER
PA Initiation    Medication: freestyle olayinka  Insurance Company: [facility] - Phone [phone] Fax [phone]  Pharmacy Filling the Rx: [facility] [place], [place] - [facility]  Filling Pharmacy Phone: [phone]  Filling Pharmacy Fax:    Start Date: 7/30/2019    Central Prior Authorization Team   Phone: [phone]      Manually faxed prior authorization form to [facility] fax# [phone]

## 2019-08-02 DIAGNOSIS — J30.2 CHRONIC SEASONAL ALLERGIC RHINITIS: ICD-10-CM

## 2019-08-02 NOTE — TELEPHONE ENCOUNTER
"Requested Prescriptions   Pending Prescriptions Disp Refills     CLEAR-ATADINE 10 MG tablet [Pharmacy Med Name: ALLERGY 10 MG TABLET] 90 tablet 1     Sig: Take 1 tablet (10 mg) by mouth every morning       Antihistamines Protocol Passed - 8/2/2019  8:00 AM        Passed - Patient is 3-64 years of age     Apply weight-based dosing for peds patients age 3 - 12 years of age.    Forward request to provider for patients under the age of 3 or over the age of 64.          Passed - Recent (12 mo) or future (30 days) visit within the authorizing provider's specialty     Patient had office visit in the last 12 months or has a visit in the next 30 days with authorizing provider or within the authorizing provider's specialty.  See \"Patient Info\" tab in inbasket, or \"Choose Columns\" in Meds & Orders section of the refill encounter.              Passed - Medication is active on med list        Last Written Prescription Date:  2/14/19  Last Fill Quantity: 90,  # refills: 1   Last office visit: 7/3/2019 with prescribing provider:  Eva   Future Office Visit:   Next 5 appointments (look out 90 days)    Aug 05, 2019  3:30 PM CDT  Telephone Visit with Mahin Penny  [facility] [place] ([facility] [place]) [address]  [place] [address] [phone]           "

## 2019-08-02 NOTE — TELEPHONE ENCOUNTER
Prescription approved per [facility] Refill Protocol.    Ramona CHEN RN

## 2019-08-05 ENCOUNTER — PATIENT OUTREACH (OUTPATIENT)
Dept: NURSING | Facility: CLINIC | Age: 34
End: 2019-08-05

## 2019-08-05 DIAGNOSIS — E11.22 TYPE 2 DIABETES MELLITUS WITH STAGE 3 CHRONIC KIDNEY DISEASE, WITH LONG-TERM CURRENT USE OF INSULIN (H): Primary | ICD-10-CM

## 2019-08-05 DIAGNOSIS — Z79.4 TYPE 2 DIABETES MELLITUS WITH STAGE 3 CHRONIC KIDNEY DISEASE, WITH LONG-TERM CURRENT USE OF INSULIN (H): Primary | ICD-10-CM

## 2019-08-05 DIAGNOSIS — N18.30 TYPE 2 DIABETES MELLITUS WITH STAGE 3 CHRONIC KIDNEY DISEASE, WITH LONG-TERM CURRENT USE OF INSULIN (H): Primary | ICD-10-CM

## 2019-08-05 PROCEDURE — 99207 ZZC HEALTH COACHING, NO CHARGE: CPT

## 2019-08-05 NOTE — PROGRESS NOTES
August 5, 2019    Health Coaching Progress Note    Patient Name: Divina Delgadillo Date: August 5, 2019      Session Length: 20      DATA    PRM Master Survey Scores Reviewed: Yes    Core Healthy Days Survey:         EMY Score (Last Two) 5/10/2018   EMY Raw Score 35   Activation Score 72.1   EMY Level 3       PHQ-2 Score 4/4/2019 2/14/2019   PHQ-2 Total Score (Adult) - Positive if 3 or more points; Administer PHQ-9 if positive 0 1       PHQ-9 SCORE 11/27/2018 2/14/2019   PHQ-9 Total Score 16 12       Treatment Objective(s) Addressed in This Session:  Target Behavior(s): diet/weight loss and disease management/lifestyle changes increasing exercise, quitting smoking    Current Stressors / Issues:  Divina's home life is changing.     What Patient Does Well:   Divina shares that she has been getting more exercise recently by walking.   Previous Successes:   Divina reports things have improved since last time we talked and continues to work with a counselor.  Areas in Need of Improvement:   Divina says the area in need of improvement at this time is continuing to monitor her BG and get more exercise by walking (plans to get back to the gym when home life changes).  Barriers to Change:   1) Stress  2) current living situation  Reasons for Change:   Diivna wants to make changes for her health and self-esteem. Divina plans to walk for stress management.  Plan/Goal for the Next 4 Weeks:   Goals Addressed as of 8/5/2019 at 3:52 PM                 Today    7/23/19      Being Active (pt-stated)   60%  50%    Added 2/13/19 by Mahin Penny     My Goal: I will continue exercising M-W-F for 30 minutes each day    What I need to meet my goal: Set up rides to gym through [employer] for M-W-F after work (2:30). Rides were cancelled by Lorin.    Divina is just walking now for exercise    I plan to meet my goal by this date: Next Coaching Session        Reducing Risks (pt-stated)   0%  On track    Added 12/5/18 by Mahin Penny     My Goal: Goal on  hold (8/5/19) - I will begin cutting back on cigarette smoking (1ppd--->12 per day----> 5 per day now as of 6/11/19--->4 per day now 7/9/19) and continue working toward complete tobacco cessation    What I need to meet my goal: Nothing needed at this time, got nicotrol inhaler    I plan to meet my goal by this date: Next Coaching Session    Divina has cut back to 4!   #1-waking  #2 (AM) and #3 (lunch) at work  #4 arrival home                Intervention:  Motivational Interviewing    MI Intervention: Expressed Empathy/Understanding, Supported Autonomy, Collaboration, Evocation, Permission to raise concern or advise, Open-ended questions, Reflections: simple and complex, Change talk (evoked) and Reframe     Change Talk Expressed by the Patient: Desire to change Ability to change Reasons to change Need to change Committment to change Activation Taking steps    Provider Response to Change Talk: E - Evoked more info from patient about behavior change, A - Affirmed patient's thoughts, decisions, or attempts at behavior change, R - Reflected patient's change talk and S - Summarized patient's change talk statements    Assessment / Progress on Treatment Objective(s) / Homework:    Minimal progress - ACTION (Actively working towards change); Intervened by reinforcing change plan / affirming steps taken     Plan: (Homework, other):  Patient was encouraged to continue to seek condition-related information and education, as well as schedule a follow up appointment with the Health  in 4 weeks. Patient has set self-identified goals and will monitor progress until the next appointment.  Next visit scheduled for September 3 at 3PM.      Mahin Penny [facility]   8/5/2019    3:57 PM

## 2019-08-08 NOTE — TELEPHONE ENCOUNTER
CHIEF COMPLAINT:  Maria Antonia Faustin is a 62 year old female who presents for a pre-op evaluation at the request of Dr. Lucas Jiang, specialty urology.  The patient is scheduled on 8/16/19 for a transurethral resection of bladder tumor under general anesthesia at [facility][note 3].    HPI: Patient had pins hematuria, was referred to urology, CT urogram that showed tumor in the plantar. Cytology report is suspicious for bladder cancer. She is going for transurethral tumor resection.    ALLERGIES:  No Known Allergies    Current Outpatient Medications   Medication Sig Dispense Refill   • montelukast (SINGULAIR) 10 MG tablet Take 1 tablet by mouth nightly. 90 tablet 2   • omeprazole (PRILOSEC) 40 MG capsule Take 1 capsule by mouth daily. 90 capsule 2   • mometasone-formoterol (DULERA) 100-5 MCG/ACT inhaler Inhale 2 puffs into the lungs 2 times daily. 39 g 0   • rosuvastatin (CRESTOR) 5 MG tablet Take 1 tablet by mouth daily. 90 tablet 1   • PROAIR  (90 Base) MCG/ACT inhaler USE 2 INHALATIONS EVERY 4 HOURS AS NEEDED FOR SHORTNESS OF BREATH OR WHEEZING 8.5 g 3   • Probiotic Product (PROBIOTIC ADVANCED PO)      • MULTIPLE VITAMIN PO Take  by mouth.     • Calcium Carbonate-Vit D-Min (CALCIUM 1200 PO) Take  by mouth.     • diclofenac (VOLTAREN) 1 % gel Apply topically 4 times daily to the left calf as needed (pain). 300 g 1   • amoxicillin (AMOXIL) 500 MG capsule Take 2 capsules by mouth the night before procedure then 1 capsule by mouth 3 times daily until gone. 21 capsule 0   • ibuprofen (MOTRIN) 600 MG tablet Take 1 tablet by mouth every 6 hours as needed. 28 tablet 0     No current facility-administered medications for this visit.         Most Recent Immunizations   Administered Date(s) Administered   • Influenza, Unspecified Formulation 10/01/2015   • Influenza, injectable, quadrivalent 10/17/2018   • Influenza, seasonal, injectable, trivalent 10/04/2016   • Novel Influenza [id number], Unspecified  Voice mail box has not been set up yet.  Could not leave a message.    Maggie Martinez  [facility] RN   Formulation 12/16/2009   • Pneumococcal polysaccharide, adult, 23 valent 02/27/2019   • Td:Adult type tetanus/diphtheria 09/04/2003   • Tdap 07/22/2013       Past Medical History:   Diagnosis Date   • Absent pulse in lower extremity    • Asthma    • Colon polyps    • Diverticulosis of sigmoid colon    • Hyperlipidemia    • IBS (irritable bowel syndrome)    • Osteoporosis, unspecified 03/03/10   • Sleep disorder    • UI (urinary incontinence)    • Varicosis        Past Surgical History:   Procedure Laterality Date   • Colonoscopy w biopsy  01/21/2011    Adenoma and Villoglandular polyps, diverticulosis, hemorrhoids,f/u 3 yrs,Dr Shafer   • Dexa bone density axial skeleton  06/17/08   • Eye surgery     • Laparoscopy,tubal cautery  1982    Tubal Ligation   • Occult blood test tube  11/15/10   • Remv cataract extracap insert lens  2000    Cataract Removal Lens Implant   • Remv cataract extracap insert lens  2000    Cataract Removal Lens Implant   • Varicose vein surgery Left 08/01/2019        Family History   Problem Relation Age of Onset   • Cancer Mother    • Diabetes Father    • Arthritis Father         hips   • High cholesterol Father    • High blood pressure Father    • Stroke Father    • Myocardial Infarction Maternal Grandmother    • Cancer Paternal Uncle         ??   • Cancer Paternal Uncle         ??   • Myocardial Infarction Paternal Uncle    • Cancer Maternal Aunt         twin sister        Social History     Socioeconomic History   • Marital status: /Civil Union     Spouse name: Not on file   • Number of children: Not on file   • Years of education: Not on file   • Highest education level: Not on file   Occupational History   • Not on file   Social Needs   • Financial resource strain: Not on file   • Food insecurity:     Worry: Not on file     Inability: Not on file   • Transportation needs:     Medical: Not on file     Non-medical: Not on file   Tobacco Use   • Smoking status: Current Every Day Smoker      Packs/day: 1.00     Years: 30.00     Pack years: 30.00     Types: Cigarettes     Last attempt to quit: 2016     Years since quittin.6   • Smokeless tobacco: Never Used   • Tobacco comment: currently under treatment   Substance and Sexual Activity   • Alcohol use: Yes     Alcohol/week: 0.0 oz     Comment: rare   • Drug use: No   • Sexual activity: Yes     Partners: Male   Lifestyle   • Physical activity:     Days per week: Not on file     Minutes per session: Not on file   • Stress: Not on file   Relationships   • Social connections:     Talks on phone: Not on file     Gets together: Not on file     Attends Muslim service: Not on file     Active member of club or organization: Not on file     Attends meetings of clubs or organizations: Not on file     Relationship status: Not on file   • Intimate partner violence:     Fear of current or ex partner: Not on file     Emotionally abused: Not on file     Physically abused: Not on file     Forced sexual activity: Not on file   Other Topics Concern   • Not on file   Social History Narrative   • Not on file        ROS: The patient denies symptoms of:     General: fever, chills, night sweats, fatigue, weight loss, weight gain and decreased appetite  Skin: rash and itching  Eyes: visual blurring, double vision, spots before the eyes and eye pain  Ears: ringing or tinnitus in the ears and discharge from the ears  Nose: nose bleed and discharge from the nose  Mouth: soreness of the mouth or tongue or lips and abnormality of the teeth or gums  Throat: sore throat and hoarseness or voice change  Neck: stiffness or pain in the neck  Cardiorespiratory: chest pain, edema, cough, hemoptysis, wheeze and shortness of breath  Gastrointestinal: abdominal pain, nausea, vomiting, constipation, diarrhea, blood in the stools and indigestion  urgency, frequency and <<PATIENT DOES HAVE SYMPTOMS OF>> hematuria  Neurologic: headache, weakness, loss of sensation, visual  disturbance, trouble with balance or coordination and loss of memory  Musculoskeletal: joint stiffness, joint swelling and muscle pain  Psychiatric: symptoms of depression, feeling sad or blue    PHYSICAL EXAM:    VITAL SIGNS:   Visit Vitals  /72 (BP Location: [facility], Patient Position: Sitting, Cuff Size: Large Adult)   Pulse 80   Resp 16   Ht 5' 4\" (1.626 m)   Wt 82.3 kg   SpO2 96%   BMI 31.14 kg/m²   ; Estimated body mass index is 31.14 kg/m² as calculated from the following:    Height as of this encounter: 5' 4\" (1.626 m).    Weight as of this encounter: 82.3 kg.   GENERAL APPEARANCE: appears stated age and is in no apparent distress  SKIN: normal texture, no skin rashes, no atypical appearing skin lesions and no bruises  HEAD: normocephalic  EYES: pupils are equal and reactive to light and accommodation extraocular movements are full sclerae and conjunctivae are normal   EARS: external auditory canals are normal, tympanic membranes are normal and auditory acuity is grossly normal  NOSE: external nose is normal to inspection  MOUTH/THROAT: tongue is midline and appears normal, oropharynx appears normal and oral mucosa is normal  NECK: neck is supple, no thyromegaly, no anterior cervical adenopathy, no posterior cervical adenopathy and no supraclavicular adenopathy  CHEST: contour is normal with normal AP diameter, normal respiratory excursion and respiratory effort is not labored  LUNGS: lungs are clear to auscultation with normal inspiratory/expiratory sounds, no rales, no rhonchi and no wheezes  HEART: normal rate and rhythm and no murmurs  ABDOMEN: abdomen is soft, normal active bowel sounds, nontender, without masses, without hepatomegaly and without splenomegaly  BACK: inspection shows no curvature, no discomfort to palpation of the midline and no costovertebral angle discomfort to palpation  RECTAL: deferred  EXTREMITIES: no edema  NEUROLOGIC: motor strength normal, gait and station normal, coordination  normal, DTR's normal and symmetric and no tremor noted      ASSESSMENT: (Z01.818) Preop examination  (primary encounter diagnosis)  Comment: Transurethral bladder tumor resection  Plan: CBC & AUTO DIFFERENTIAL, BASIC METABOLIC PANEL,        ELECTROCARDIOGRAM 12-LEAD        EKG and labs are normal.    The patient is cleared for the above procedure under the planned anesthesia.        Please cc a copy to Dr. Lucas Jiang

## 2019-08-08 NOTE — TELEPHONE ENCOUNTER
Letter of Medical Necessity written to help provide additional documentation if needed.  Please see letters.    Thanks!  Katiana Riley RD, LD, CDE  Diabetes Education

## 2019-08-11 NOTE — TELEPHONE ENCOUNTER
Medication Appeal Initiation    We have initiated an appeal for the requested medication:  Medication: freestyle olayinka  Appeal Start Date:  8/9/2019  Insurance Company: [employer] - Phone [phone] Fax [phone]  Comments:  Appeal initiated with letter of medical necessity included and faxed to

## 2019-08-14 DIAGNOSIS — F25.0 SCHIZOAFFECTIVE DISORDER, BIPOLAR TYPE (H): ICD-10-CM

## 2019-08-14 LAB
BASOPHILS # BLD AUTO: 0.1 10E9/L (ref 0–0.2)
BASOPHILS NFR BLD AUTO: 0.7 %
DIFFERENTIAL METHOD BLD: ABNORMAL
EOSINOPHIL # BLD AUTO: 0.2 10E9/L (ref 0–0.7)
EOSINOPHIL NFR BLD AUTO: 1.3 %
ERYTHROCYTE [DISTWIDTH] IN BLOOD BY AUTOMATED COUNT: 13.9 % (ref 10–15)
HCT VFR BLD AUTO: 38.6 % (ref 35–47)
HGB BLD-MCNC: 11.9 G/DL (ref 11.7–15.7)
IMM GRANULOCYTES # BLD: 0.2 10E9/L (ref 0–0.4)
IMM GRANULOCYTES NFR BLD: 0.9 %
LYMPHOCYTES # BLD AUTO: 5.3 10E9/L (ref 0.8–5.3)
LYMPHOCYTES NFR BLD AUTO: 29.9 %
MCH RBC QN AUTO: 29.8 PG (ref 26.5–33)
MCHC RBC AUTO-ENTMCNC: 30.8 G/DL (ref 31.5–36.5)
MCV RBC AUTO: 97 FL (ref 78–100)
MONOCYTES # BLD AUTO: 1.3 10E9/L (ref 0–1.3)
MONOCYTES NFR BLD AUTO: 7.3 %
NEUTROPHILS # BLD AUTO: 10.7 10E9/L (ref 1.6–8.3)
NEUTROPHILS NFR BLD AUTO: 59.9 %
NRBC # BLD AUTO: 0 10*3/UL
NRBC BLD AUTO-RTO: 0 /100
PLATELET # BLD AUTO: 383 10E9/L (ref 150–450)
PLATELET # BLD EST: ABNORMAL 10*3/UL
RBC # BLD AUTO: 4 10E12/L (ref 3.8–5.2)
RBC MORPH BLD: NORMAL
WBC # BLD AUTO: 17.8 10E9/L (ref 4–11)

## 2019-08-14 PROCEDURE — 36415 COLL VENOUS BLD VENIPUNCTURE: CPT | Performed by: CLINICAL NURSE SPECIALIST

## 2019-08-14 PROCEDURE — 85025 COMPLETE CBC W/AUTO DIFF WBC: CPT | Performed by: CLINICAL NURSE SPECIALIST

## 2019-08-14 NOTE — TELEPHONE ENCOUNTER
Received voicemail from [facility] that stated they processed this and she called the pharmacy and they now have a paid claim for the Freestyle Yash Sensor and Smoot. Per [facility] [place] has them both ready with a $0 copay each.

## 2019-08-20 ENCOUNTER — ALLIED HEALTH/NURSE VISIT (OUTPATIENT)
Dept: EDUCATION SERVICES | Facility: CLINIC | Age: 34
End: 2019-08-20
Payer: MEDICARE

## 2019-08-20 DIAGNOSIS — Z79.4 TYPE 2 DIABETES MELLITUS WITH STAGE 3 CHRONIC KIDNEY DISEASE, WITH LONG-TERM CURRENT USE OF INSULIN (H): ICD-10-CM

## 2019-08-20 DIAGNOSIS — E11.22 TYPE 2 DIABETES MELLITUS WITH STAGE 3 CHRONIC KIDNEY DISEASE, WITH LONG-TERM CURRENT USE OF INSULIN (H): ICD-10-CM

## 2019-08-20 DIAGNOSIS — N18.30 TYPE 2 DIABETES MELLITUS WITH STAGE 3 CHRONIC KIDNEY DISEASE, WITH LONG-TERM CURRENT USE OF INSULIN (H): ICD-10-CM

## 2019-08-20 PROCEDURE — G0108 DIAB MANAGE TRN  PER INDIV: HCPCS

## 2019-08-20 NOTE — PATIENT INSTRUCTIONS
Create a PayLease account at home to upload the reader device:   1. Go to [email or web address] and click the green button  Sign Up   2. Select [employer]   3. Select your Country of Residence   4. Agree to the Terms of Use and Privacy Policy   5. Fill out your information, enter your email as your username, and create a password  6. Upload with the yellow charging cord   7. [employer] Customer Support, [phone] (Monday through Friday 8AM to 8PM  Eastern time, excluding holidays).  You can link your account to [facility]   1. Go to your Settings section and select Account Settings   2. Click on MyPractices  3. [facility] Practice ID is: [id number]   4. Enter in your healthcare provider s Practice ID and click Add. You should now see their name or their practice name appear under your practice list      Please read the full Freestyle Yash manual for operating instructions and important safety information before use.  The following highlights some of the important information.     1.  The reader comes with a quick start guide for how to place the sensor and begin it.  It is FDA approved for the back of the arm only.  You can also view short  how to  videos on this website: [email or web address]   Go to the support tab in the upper right hand corner and click overview.      2. The 14 day sensor takes an hour to warm up before use, however is less accurate during the first day and therefor you still want to check your blood sugar.  If you see the  eye glass and blood drop symbol  this means check a finger stick blood sugar before making any treatment decisions.  If you are in doubt of the accuracy always use a blood finger stick.        3. Make sure you are adequately hydrated while using this system as dehydration can lead to inaccurate results.   Aspirin use > 350mg will temporarily make the sensor read lower.  Vitamin C intake of > 1000mg will temporarily make the sensor  read higher.  Use a blood fingerstick.     4. The sensor can hold 8 hours of data.    Read/scan blood sugars every 8 hours to ensure entirety of data is available.  The reader can store 90 days of data.      5. Focus on trend arrows- will let you know if blood sugars are rising, falling or stable at the time you check.  Your blood sugar and sensor glucose will not always match.       6. It is okay to shower, bathe, and swim (up to 3 feet deep for 30 minutes) with sensor. Do not get reader wet.    7. Remove the sensor if you need to have an MRI, CT scan, x-ray or diathermy.    Metal detectors are ok.       8. Do not cover the sensor with extra adhesive (the small hole the center of the sensor must remain uncovered).  If you have trouble with sensor falling off, these are approved products to help with sensor adhesion: Torbot Skin Tac, SKIN-PREP Protective Barrier Wipe and Mastisol Liquid Adhesive.  Make sure to let these products dry appropriately before applying the sensor.      9. The reader functions as a glucometer and uses FreeStyle Precision Shahram Test strips.  These are over the counter and you do not need a prescription to buy them.  Test strips are foil wrapped since you will likely be using less of them.     10.  The website [email or web address].freestylelibre.[email or web address]/   has a comprehensive FAQ section.  Go to support in the upper right hand corner and then click FAQ.      11.  For reader and sensor questions or concerns please call [employer] Customer Support at [phone] (Monday through Friday 8AM to 8PM  Eastern time, excluding holidays).     Dolly Riley RD, LD, CDE  For Diabetes Education appointments call: [phone]   For Diabetes Education Related Questions call: [phone] or email: [email or web address]

## 2019-08-20 NOTE — Clinical Note
Divina Mcfadden got the personal FreeStyle Yash continuous glucose monitor!   [employer] the secondary insurance accepted our appeal!  We will review results in a month. Thanks,Katiana

## 2019-08-20 NOTE — PROGRESS NOTES
Diabetes Self-Management Education & Support     Personal CGM Start    SUBJECTIVE/OBJECTIVE  Presents for: Follow-up  Accompanied by: Self, Other  Diabetes education in the past 24mo: Yes  Focus of Visit: CGM  Diabetes type: Type 2  Disease course: Improving  Transportation concerns: Yes  Other concerns:: None  Cultural Influences/Ethnic Background:  American    Patient seen today for Personal CGM Start:  Patient completed homework (online training and/or Getting started with CGM guide)? : Yes  Sensor started:: Started today in clinic  CGM Initial Settings: Freestyle Yash  Freestyle Yash Target Glucose Range (mg/dL):   CGM Start Plan: Change sensor, as directed.    Healthy Eating  Healthy Eating Assessed Today: No  Meal planning: Smaller portions(Varies between portion control and avoiding sweets to no meal planning)  Beverages: Water, Diet soda, Sports drinks(reports adding in some juice again)  Has patient met with a dietitian in the past?: Yes    Being Active  Being Active Assessed Today: Yes    Monitoring  Monitoring Assessed Today: Yes  Did patient bring glucose meter to appointment? : Yes  Blood Glucose Meter: Accu-check, CGM  Home Glucose (Sugar) Monitoring: 3-4 times per day    Taking Medications  Diabetes Medication(s)     Insulin       insulin glargine (LANTUS SOLOSTAR PEN) 100 UNIT/ML pen    Inject 19 units once daily   (allow max dose 25 units/day for titration)    Incretin Mimetic Agents (GLP-1 Receptor Agonists)       semaglutide (OZEMPIC) 1 MG/DOSE pen    Inject 1 mg Subcutaneous every 7 days          Taking Medication Assessed Today: Yes  Current Treatments: Insulin Injections, Non-insulin Injectables  Problems taking diabetes medications regularly?: No  Diabetes medication side effects?: No  Treatment Compliance: All of the time    Problem Solving  Problem Solving Assessed Today: No  Hypoglycemia Frequency: Rarely(not recently)  Patient carries a carbohydrate source: Yes    Reducing  Risks  Reducing Risks Assessed Today: No    Healthy Coping  Healthy Coping Assessed Today: Yes  Emotional response to diabetes: Ready to learn  Informal Support system:: Friends, Family, Other  Stage of change: ACTION (Actively working towards change)(varies between preparation and action)  Support resources: Websites, In-person Offerings  Patient Activation Measure Survey Score:  EMY Score (Last Two) 5/10/2018   EMY Raw Score 35   Activation Score 72.1   EMY Level 3     ASSESSMENT:   Personal FreeStyle Yash started today in clinic.      Education provided today on:  AADE Self-Care Behaviors:  Monitoring: continuous glucose monitor   CGM-specific education:   Freestyle Yash sensor: insertion technique, sensor site location and rotation, insulin administration in relation to sensor placement, sensor wear, reasons to remove sensor (MRI, CT, diathermy), Vitamin C & Aspirin effects on sensor, Yash [place]: frequency of scanning sensor, length of time data is visible, use of built in glucose meter, Precision X-tra test strips, Use of trends and graphs for pattern management and problem solving and [employer] software    Opportunities for ongoing education and support in diabetes-self management were discussed.  Pt verbalized understanding of concepts discussed and recommendations provided today.       Education Materials Provided:  BG Log Sheet    PLAN  See Patient Instructions for co-developed, patient-stated behavior change goals.  AVS printed and provided to patient today. See Follow-Up section for recommended follow-up.    Dolly Riley RD, LD, CDE  Diabetes Education    Time Spent: 30 minutes  Encounter Type: Individual

## 2019-09-02 DIAGNOSIS — K21.9 GASTROESOPHAGEAL REFLUX DISEASE WITHOUT ESOPHAGITIS: ICD-10-CM

## 2019-09-02 DIAGNOSIS — J45.21 MILD INTERMITTENT ASTHMA WITH ACUTE EXACERBATION: ICD-10-CM

## 2019-09-03 NOTE — TELEPHONE ENCOUNTER
"Requested Prescriptions   Pending Prescriptions Disp Refills     montelukast (SINGULAIR) 10 MG tablet [Pharmacy Med Name: MONTELUKAST SODIUM 10 MG TABLET] 90 tablet 1     Sig: Take 1 tablet (10 mg) by mouth At Bedtime       Leukotriene Inhibitors Protocol Failed - 9/2/2019  8:00 AM        Failed - Asthma control assessment score within normal limits in last 6 months     Please review ACT score.           Passed - Patient is age 12 or older     If patient is under 16, ok to refill using age based dosing.           Passed - Medication is active on med list        Passed - Recent (6 mo) or future (30 days) visit within the authorizing provider's specialty     Patient had office visit in the last 6 months or has a visit in the next 30 days with authorizing provider or within the authorizing provider's specialty.  See \"Patient Info\" tab in inbasket, or \"Choose Columns\" in Meds & Orders section of the refill encounter.            ranitidine (ZANTAC) 300 MG tablet [Pharmacy Med Name: RANITIDINE  MG TABLET] 90 tablet 1     Sig: Take 1 tablet (300 mg) by mouth At Bedtime       H2 Blockers Protocol Passed - 9/2/2019  8:00 AM        Passed - Patient is age 12 or older        Passed - Recent (12 mo) or future (30 days) visit within the authorizing provider's specialty     Patient had office visit in the last 12 months or has a visit in the next 30 days with authorizing provider or within the authorizing provider's specialty.  See \"Patient Info\" tab in inbasket, or \"Choose Columns\" in Meds & Orders section of the refill encounter.              Passed - Medication is active on med list        Both meds:  Last Written Prescription Date:  2/14/2019  Last Fill Quantity: 90,  # refills: 1   Last office visit: 7/3/2019 with prescribing provider:  Eva   Future Office Visit:   Next 5 appointments (look out 90 days)    Sep 03, 2019  3:00 PM CDT  Telephone Visit with Mahin Penny  [facility] [place] ([facility] [address] [facility]  [place] [id number] [phone]

## 2019-09-04 RX ORDER — MONTELUKAST SODIUM 10 MG/1
10 TABLET ORAL AT BEDTIME
Qty: 90 TABLET | Refills: 1 | Status: SHIPPED | OUTPATIENT
Start: 2019-09-04 | End: 2019-12-31

## 2019-09-12 ENCOUNTER — TRANSFERRED RECORDS (OUTPATIENT)
Dept: HEALTH INFORMATION MANAGEMENT | Facility: CLINIC | Age: 34
End: 2019-09-12

## 2019-09-18 ENCOUNTER — PATIENT OUTREACH (OUTPATIENT)
Dept: EDUCATION SERVICES | Facility: CLINIC | Age: 34
End: 2019-09-18
Payer: MEDICARE

## 2019-09-18 ENCOUNTER — TELEPHONE (OUTPATIENT)
Dept: EDUCATION SERVICES | Facility: CLINIC | Age: 34
End: 2019-09-18

## 2019-09-18 DIAGNOSIS — N18.30 TYPE 2 DIABETES MELLITUS WITH STAGE 3 CHRONIC KIDNEY DISEASE, WITH LONG-TERM CURRENT USE OF INSULIN (H): Primary | ICD-10-CM

## 2019-09-18 DIAGNOSIS — E11.22 TYPE 2 DIABETES MELLITUS WITH STAGE 3 CHRONIC KIDNEY DISEASE, WITH LONG-TERM CURRENT USE OF INSULIN (H): Primary | ICD-10-CM

## 2019-09-18 DIAGNOSIS — Z79.4 TYPE 2 DIABETES MELLITUS WITH STAGE 3 CHRONIC KIDNEY DISEASE, WITH LONG-TERM CURRENT USE OF INSULIN (H): Primary | ICD-10-CM

## 2019-09-18 NOTE — TELEPHONE ENCOUNTER
Patient would like Yash sensors to monitor blood sugars. Please sign pended orders if you are in agreement.  Elle Mishra RD LD CDE

## 2019-09-18 NOTE — PROGRESS NOTES
Neto Padgett called asking for script for Yash sensors to be sent to [facility].

## 2019-09-18 NOTE — TELEPHONE ENCOUNTER
Number not in service    Tobacco Treatment Program at the [facility] attempted to reach Ms. Delgadillo on 9/18/2019 regarding the tobacco cessation program to help Ms. Delgadillo to quit smoking. We will attempt to reach Ms. Delgadillo another time.     Shelley Campos [facility]  Tobacco Treatment Specialist  PH: [phone]

## 2019-09-19 RX ORDER — FLASH GLUCOSE SCANNING READER
1 EACH MISCELLANEOUS CONTINUOUS
Qty: 1 DEVICE | Refills: 0 | Status: SHIPPED | OUTPATIENT
Start: 2019-09-19 | End: 2019-10-10 | Stop reason: ALTCHOICE

## 2019-09-19 RX ORDER — FLASH GLUCOSE SENSOR
1 KIT MISCELLANEOUS
Qty: 2 EACH | Refills: 11 | Status: SHIPPED | OUTPATIENT
Start: 2019-09-19 | End: 2019-10-10 | Stop reason: ALTCHOICE

## 2019-09-24 DIAGNOSIS — F25.0 SCHIZOAFFECTIVE DISORDER, BIPOLAR TYPE (H): ICD-10-CM

## 2019-09-24 DIAGNOSIS — N18.30 CKD (CHRONIC KIDNEY DISEASE) STAGE 3, GFR 30-59 ML/MIN (H): ICD-10-CM

## 2019-09-24 LAB
ALBUMIN SERPL-MCNC: 3.4 G/DL (ref 3.4–5)
ANION GAP SERPL CALCULATED.3IONS-SCNC: 7 MMOL/L (ref 3–14)
BASOPHILS # BLD AUTO: 0.1 10E9/L (ref 0–0.2)
BASOPHILS NFR BLD AUTO: 0.8 %
BUN SERPL-MCNC: 27 MG/DL (ref 7–30)
CALCIUM SERPL-MCNC: 9.1 MG/DL (ref 8.5–10.1)
CHLORIDE SERPL-SCNC: 111 MMOL/L (ref 94–109)
CO2 SERPL-SCNC: 21 MMOL/L (ref 20–32)
CREAT SERPL-MCNC: 1.29 MG/DL (ref 0.52–1.04)
CREAT UR-MCNC: 88 MG/DL
DIFFERENTIAL METHOD BLD: ABNORMAL
EOSINOPHIL # BLD AUTO: 0.3 10E9/L (ref 0–0.7)
EOSINOPHIL NFR BLD AUTO: 2 %
ERYTHROCYTE [DISTWIDTH] IN BLOOD BY AUTOMATED COUNT: 13.8 % (ref 10–15)
GFR SERPL CREATININE-BSD FRML MDRD: 54 ML/MIN/{1.73_M2}
GLUCOSE SERPL-MCNC: 207 MG/DL (ref 70–99)
HCT VFR BLD AUTO: 38.5 % (ref 35–47)
HGB BLD-MCNC: 12.2 G/DL (ref 11.7–15.7)
LYMPHOCYTES # BLD AUTO: 5.6 10E9/L (ref 0.8–5.3)
LYMPHOCYTES NFR BLD AUTO: 39.4 %
MCH RBC QN AUTO: 31 PG (ref 26.5–33)
MCHC RBC AUTO-ENTMCNC: 31.7 G/DL (ref 31.5–36.5)
MCV RBC AUTO: 98 FL (ref 78–100)
MONOCYTES # BLD AUTO: 1.2 10E9/L (ref 0–1.3)
MONOCYTES NFR BLD AUTO: 8.4 %
NEUTROPHILS # BLD AUTO: 7 10E9/L (ref 1.6–8.3)
NEUTROPHILS NFR BLD AUTO: 49.4 %
PHOSPHATE SERPL-MCNC: 3.5 MG/DL (ref 2.5–4.5)
PLATELET # BLD AUTO: 416 10E9/L (ref 150–450)
POTASSIUM SERPL-SCNC: 4.9 MMOL/L (ref 3.4–5.3)
PROT UR-MCNC: 0.17 G/L
PROT/CREAT 24H UR: 0.2 G/G CR (ref 0–0.2)
PTH-INTACT SERPL-MCNC: 53 PG/ML (ref 18–80)
RBC # BLD AUTO: 3.94 10E12/L (ref 3.8–5.2)
SODIUM SERPL-SCNC: 139 MMOL/L (ref 133–144)
WBC # BLD AUTO: 14.2 10E9/L (ref 4–11)

## 2019-09-24 PROCEDURE — 80069 RENAL FUNCTION PANEL: CPT | Performed by: CLINICAL NURSE SPECIALIST

## 2019-09-24 PROCEDURE — 83970 ASSAY OF PARATHORMONE: CPT | Performed by: CLINICAL NURSE SPECIALIST

## 2019-09-24 PROCEDURE — 84156 ASSAY OF PROTEIN URINE: CPT | Performed by: INTERNAL MEDICINE

## 2019-09-24 PROCEDURE — 36415 COLL VENOUS BLD VENIPUNCTURE: CPT | Performed by: CLINICAL NURSE SPECIALIST

## 2019-09-24 PROCEDURE — 85025 COMPLETE CBC W/AUTO DIFF WBC: CPT | Performed by: CLINICAL NURSE SPECIALIST

## 2019-09-24 NOTE — LETTER
September 25, 2019      Divina Delgadillo  [address] [facility] [address]               Dear Divina,    Your most recent lab results are attached.  Your kidney function is stable but not normal as you are aware. There is no protein present in the urine which is reassuring. Please call or MyChart with questions or concerns. [phone].        Sincerely,      Sánchez Dias DO    Division of Renal Diseases and Hypertension  [facility]  KW/gw      Component      Latest Ref Rng & Units 9/24/2019   Sodium      133 - 144 mmol/L 139   Potassium      3.4 - 5.3 mmol/L 4.9   Chloride      94 - 109 mmol/L 111 (H)   Carbon Dioxide      20 - 32 mmol/L 21   Anion Gap      3 - 14 mmol/L 7   Glucose      70 - 99 mg/dL 207 (H)   Urea Nitrogen      7 - 30 mg/dL 27   Creatinine      0.52 - 1.04 mg/dL 1.29 (H)   GFR Estimate      >60 mL/min/1.73:m2 54 (L)   GFR Estimate If Black      >60 mL/min/1.73:m2 63   Calcium      8.5 - 10.1 mg/dL 9.1   Phosphorus      2.5 - 4.5 mg/dL 3.5   Albumin      3.4 - 5.0 g/dL 3.4   Protein Random Urine      g/L 0.17   Protein Total Urine g/gr Creatinine      0 - 0.2 g/g Cr 0.20   Parathyroid Hormone Intact      18 - 80 pg/mL 53   Creatinine Urine      mg/dL 88

## 2019-09-25 ENCOUNTER — TELEPHONE (OUTPATIENT)
Dept: EDUCATION SERVICES | Facility: CLINIC | Age: 34
End: 2019-09-25

## 2019-09-25 ENCOUNTER — TELEPHONE (OUTPATIENT)
Dept: FAMILY MEDICINE | Facility: CLINIC | Age: 34
End: 2019-09-25

## 2019-09-25 NOTE — TELEPHONE ENCOUNTER
Diabetes Education follow up     Appointment re-scheudled for 10/15.     Lorin tried to get the FreeStyle Yash continuous glucose monitor at [facility], however they weren't able to fill it.  Notified by [facility] that they thought the original prescription should stay at [place] as that is where the appeal and  prior authorization were first completed.  Lorin reports that [facility] doesn't know if it can be filled, but she will check with the pharmacy again.       See prior authorization note 7/15/19  - [employer] appeals rep stated that the claim for the FreeStyle Yash went through.  Patient was able to pick this up and begin. If unable to process this refill would recommend sending to the PA pool for further investigation.  Question if [facility] approved only 1 reader + 2 sensors versus ongoing refills.      Dolly Riley RD, LD, CDE  For Diabetes Education appointments call: [phone]   For Diabetes Education Related Questions call: [phone] or email: [email or web address]

## 2019-09-25 NOTE — TELEPHONE ENCOUNTER
[employer] faxed form of Rejected for the FreeStyle Yash 14 Day Sensor.   Noticed back on 7/15/19 there was an appeal done for this Rx then.     Luanne Lagos on 9/25/2019 at 9:56 AM

## 2019-09-26 NOTE — TELEPHONE ENCOUNTER
Please route this to the correct clinic. [place] is [facility]. This enc needs to go to [place]. Thanks.

## 2019-09-26 NOTE — TELEPHONE ENCOUNTER
Called [facility] at [phone] to see if they are running thi Yash through [facility] which we obtained the approval for but patient now has Medicare- [facility] is now her primary and Part D is not covering (Per Medicare rules part D does not cover diabetic supplies) but per tech her part B states she does not meet the requirements for it to be covered through them (tech listed off some of the requirements she was seeing on their end that patient has to have diabetes, testing glucose at least 4 times daily, injecting at least 3 times daily or is using a pump, also has seen the dr within last 6 months and has appointments every 6 months) . Tech did have a phone number for Medicare as [phone]. [facility] does not handle Part B claims, this is through her medical benefits and sounds like a CMN form might be needed to be filled out and PA team does not handle CMN paperwork. Routing back to clinic.

## 2019-10-01 DIAGNOSIS — E78.5 HYPERLIPIDEMIA LDL GOAL <100: ICD-10-CM

## 2019-10-01 DIAGNOSIS — K21.9 GASTROESOPHAGEAL REFLUX DISEASE WITHOUT ESOPHAGITIS: ICD-10-CM

## 2019-10-01 NOTE — TELEPHONE ENCOUNTER
"Requested Prescriptions   Pending Prescriptions Disp Refills     omeprazole (PRILOSEC) 20 MG DR capsule [Pharmacy Med Name: OMEPRAZOLE 20 MG CAPSULE DR] 60 capsule 5     Sig: Take 1 capsule (20 mg) by mouth daily   Last Written Prescription Date:  10/17/18  Last Fill Quantity: 60 cap,  # refills: 5   Last office visit: 7/3/2019 with prescribing provider:  Jaleesa Velasquez     Future Office Visit:        PPI Protocol Passed - 10/1/2019  8:01 AM        Passed - Not on Clopidogrel (unless Pantoprazole ordered)        Passed - No diagnosis of osteoporosis on record        Passed - Recent (12 mo) or future (30 days) visit within the authorizing provider's specialty     Patient has had an office visit with the authorizing provider or a provider within the authorizing providers department within the previous 12 mos or has a future within next 30 days. See \"Patient Info\" tab in inbasket, or \"Choose Columns\" in Meds & Orders section of the refill encounter.              Passed - Medication is active on med list        Passed - Patient is age 18 or older        Passed - No active pregnacy on record        Passed - No positive pregnancy test in past 12 months          "

## 2019-10-01 NOTE — TELEPHONE ENCOUNTER
Please see note below from Anna Tariq.  Any thoughts how to proceed for processing of patient's Yash?  Please let me know if I can help.  Thank you,  Mayelin

## 2019-10-01 NOTE — TELEPHONE ENCOUNTER
"Requested Prescriptions   Pending Prescriptions Disp Refills     atorvastatin (LIPITOR) 10 MG tablet 30 tablet 5     Sig: Take 1 tablet (10 mg) by mouth daily   Last Written Prescription Date:  7/13/19  Last Fill Quantity: 30 tab,  # refills: 5   Last office visit: 7/3/2019 with prescribing provider:  Jaleesa Velasquez     Future Office Visit:        Statins Protocol Passed - 10/1/2019  1:24 PM        Passed - LDL on file in past 12 months     Recent Labs   Lab Test 07/12/19  0828   *             Passed - No abnormal creatine kinase in past 12 months     Recent Labs   Lab Test 03/22/19  1735                   Passed - Recent (12 mo) or future (30 days) visit within the authorizing provider's specialty     Patient has had an office visit with the authorizing provider or a provider within the authorizing providers department within the previous 12 mos or has a future within next 30 days. See \"Patient Info\" tab in inbasket, or \"Choose Columns\" in Meds & Orders section of the refill encounter.              Passed - Medication is active on med list        Passed - Patient is age 18 or older        Passed - No active pregnancy on record        Passed - No positive pregnancy test in past 12 months          "

## 2019-10-03 RX ORDER — ATORVASTATIN CALCIUM 10 MG/1
10 TABLET, FILM COATED ORAL DAILY
Qty: 30 TABLET | Refills: 5 | OUTPATIENT
Start: 2019-10-03

## 2019-10-07 NOTE — TELEPHONE ENCOUNTER
Lorin, , reports that patient is at work.  They have already picked up the Free style Yash at [facility] pharmacy.  Katiana Riley, Diabetic Educator had told patient that [employer], patient's insurance, covers this medication, but [facility] pharmacy does not know if they will get paid for it.  So, all of patient's medications are filled at [facility] and Free Style Yash is filled at [facility].  Patient's insurance is [employer]    Routing to Mayelin KUNZ Rn

## 2019-10-07 NOTE — TELEPHONE ENCOUNTER
We do not have patient's new insurance listed in chart for [facility].  Can we either attempt call to patient or (preferred) pharmacy ([phone]) to see what is needed in order to attempt to process her Freestyle Yash.    Thank you.  Mayelin

## 2019-10-09 NOTE — TELEPHONE ENCOUNTER
I really struggle to understand the whole Yash process of approval and denial.  I'm sorry for delay in response.    I have reread through the notes and it appears patient's primary Medicare insurance carrier has changed. Medicare - [facility] [employer]    According to G TREVOR MORENO note: [place] did have a phone number for Medicare as [phone].  I have attempted to call but it was too late and they were closed.  TREVOR MORENO indicated a CMN may be needed but I'm not sure where to locate one for patient.  Can we attempt a call to number above during business hours to see if they can fax a CMN for Part B billing of patient's Yash?  Medicare ID [id number]    Thank you.

## 2019-10-09 NOTE — TELEPHONE ENCOUNTER
Ryan Dick,     This patient has Medicare as primary and [employer] as secondary.   She does not meet the Medicare Part B guidelines for a FreeStyle Yash.  The letter that I originally wrote was to appeal [employer] (her secondary insurance) to see if they would cover a FreeStyle Yash through the pharmacy.      She will not meet Medicare Part B guidelines.  I was told it would be easier to appeal [employer] than Medicare Part B.     It appears the original claim went through [employer]  (encounter 7/15, note 8/14) and [employer] covered the 1 month.  I was hoping we could get [employer] to cover the full year of sensors.     Please let me know what you think    Thanks!  Katiana Riley RD, LD, CDE  Diabetes Education

## 2019-10-10 NOTE — TELEPHONE ENCOUNTER
Can we check with pharmacy and see why the Yash won't process?    Thank you.     Anders Jarrett  [phone]

## 2019-10-10 NOTE — TELEPHONE ENCOUNTER
I called pharmacy.    Pt does not meet the criteria for Yash monitor.    Requirements are:  Needs to be checking glucoses 4 x per day or more.  Needs to be dosing with insulin 3 x per day or more or have an insulin pump.   Pt has frequent insulin adjustments.    Pt does not meet this criteria.    Routed back to [facility] in diabetic ed.    Odalys Mattson RN

## 2019-10-10 NOTE — TELEPHONE ENCOUNTER
Those are the Medicare Part B requirements for continuous glucose monitor, so it is expected that she is not going to meet those as she is not on multiple daily injections. The goal is to appeal [facility], patient's secondary insurance.   I will try to call [facility] next week.    Thanks,   Katiana Riley RD, LD, CDE  Diabetes Education

## 2019-10-12 NOTE — TELEPHONE ENCOUNTER
"I received a PA request from the pharmacy for [facility].  I completed the PA online and it was denied indicating this is an excluded benefit.  I'm wondering if [facility] has changed their requirements so they only approved a one time fill?  The phone number provided with the denial was [phone].  I attempted to call but did not get anywhere.  Lots of \"press this number\" but then \"we're sorry\".       I also checked previous notes and see the letter that was sent to appeal but I don't see that [facility] sent us any documentation indicating appeal was approved, only a phone call.  (7/15/2019)  I have faxed your letter again to [facility] with a cover sheet indicating the appeal does not appear to be going through.  Fax number [phone] (phone number listed here is [phone])  "

## 2019-10-15 ENCOUNTER — ALLIED HEALTH/NURSE VISIT (OUTPATIENT)
Dept: EDUCATION SERVICES | Facility: CLINIC | Age: 34
End: 2019-10-15
Payer: MEDICARE

## 2019-10-15 DIAGNOSIS — E11.22 TYPE 2 DIABETES MELLITUS WITH STAGE 3 CHRONIC KIDNEY DISEASE, WITHOUT LONG-TERM CURRENT USE OF INSULIN (H): ICD-10-CM

## 2019-10-15 DIAGNOSIS — N18.30 TYPE 2 DIABETES MELLITUS WITH STAGE 3 CHRONIC KIDNEY DISEASE, WITH LONG-TERM CURRENT USE OF INSULIN (H): Primary | ICD-10-CM

## 2019-10-15 DIAGNOSIS — E11.22 TYPE 2 DIABETES MELLITUS WITH STAGE 3 CHRONIC KIDNEY DISEASE, WITH LONG-TERM CURRENT USE OF INSULIN (H): Primary | ICD-10-CM

## 2019-10-15 DIAGNOSIS — Z79.4 TYPE 2 DIABETES MELLITUS WITH STAGE 3 CHRONIC KIDNEY DISEASE, WITH LONG-TERM CURRENT USE OF INSULIN (H): Primary | ICD-10-CM

## 2019-10-15 DIAGNOSIS — N18.30 TYPE 2 DIABETES MELLITUS WITH STAGE 3 CHRONIC KIDNEY DISEASE, WITHOUT LONG-TERM CURRENT USE OF INSULIN (H): ICD-10-CM

## 2019-10-15 PROCEDURE — G0108 DIAB MANAGE TRN  PER INDIV: HCPCS

## 2019-10-15 RX ORDER — FLURBIPROFEN SODIUM 0.3 MG/ML
SOLUTION/ DROPS OPHTHALMIC
Qty: 100 EACH | Refills: 11 | Status: ON HOLD | OUTPATIENT
Start: 2019-10-15 | End: 2020-05-26

## 2019-10-15 NOTE — TELEPHONE ENCOUNTER
She has reached out to pt in an updated communication and written orders for an Accu-chek test strips and pen needles.  Our e-prescribe is down today.  I called [facility]rett and gave verbal orders.  Odalys Mattson RN

## 2019-10-15 NOTE — PATIENT INSTRUCTIONS
Breakfast:    - cottage cheese / peaches + English muffin (high fiber/ protein)     Finish the cucumber today (goal 1)     List of foods for free foods.    Cucumbers  Peppers  Carrots             - start journaling again (bullet point each day)      - drink 1 full glass of water at work right when you get there    - buy a new water bottle     Decrease Lantus from 19 to 17 units   Decrease to 15 units if quitting smoking     If you ever scan and see below 70mg/dL, do a fingerprick right with it.      Save the other olayinka sensor for November 2nd and after, finger pokes in the meantime     Dolly Riley RD, LD, CDE  For Diabetes Education appointments call: [phone]   For Diabetes Education Related Questions call: [phone] or email: [email or web address]

## 2019-10-15 NOTE — PROGRESS NOTES
Diabetes Self-Management Education & Support  Follow-up and Continuous Glucose Monitor Download  Divina Delgadillo presents today for download and report review of Personal continuous glucose monitor device    Most Recent A1c Result:    Lab Results   Component Value Date    A1C 8.1 10/17/2019     Continuous Glucose Monitor Interpretation   Reports:               Healthy Eating  Healthy Eating Assessed Today: No  Meal planning: Smaller portions(Varies between portion control and avoiding sweets to no meal planning)  Beverages: Water, Diet soda, Sports drinks(reports adding in some juice again)  Has patient met with a dietitian in the past?: Yes    Being Active  Being Active Assessed Today: Yes  Exercise:: Currently not exercising    Monitoring  Monitoring Assessed Today: Yes  Did patient bring glucose meter to appointment? : Yes  Blood Glucose Meter: CGM  Home Glucose (Sugar) Monitoring: 3-4 times per day    Taking Medications  Diabetes Medication(s)     Insulin       insulin glargine (LANTUS SOLOSTAR PEN) 100 UNIT/ML pen    Inject 19 units once daily   (allow max dose 25 units/day for titration)    Incretin Mimetic Agents (GLP-1 Receptor Agonists)       semaglutide (OZEMPIC) 1 MG/DOSE pen    Inject 1 mg Subcutaneous every 7 days          Taking Medication Assessed Today: Yes  Current Treatments: Insulin Injections, Non-insulin Injectables  Problems taking diabetes medications regularly?: No  Diabetes medication side effects?: No  Treatment Compliance: All of the time    Problem Solving  Problem Solving Assessed Today: No  Hypoglycemia Frequency: Rarely(not recently)  Patient carries a carbohydrate source: Yes      Reducing Risks  Reducing Risks Assessed Today: No    Healthy Coping  Healthy Coping Assessed Today: Yes  Emotional response to diabetes: Ready to learn  Informal Support system:: Friends, Family, Other  Stage of change: ACTION (Actively working towards change)(varies between preparation and  action)  Support resources: Websites, In-person Offerings  Patient Activation Measure Survey Score:  EMY Score (Last Two) 5/10/2018   EMY Raw Score 35   Activation Score 72.1   EMY Level 3     Assessment:  Medication and/or insulin dosing is: accurate    Goals        General    Being Active (pt-stated)     Notes - Note edited  8/5/2019  3:50 PM by Mahin Penny    My Goal: I will continue exercising M-W-F for 30 minutes each day    What I need to meet my goal: Set up rides to gym through [employer] for M-W-F after work (2:30). Rides were cancelled by Beena    Divina is just walking now for exercise    I plan to meet my goal by this date: Next Coaching Session      Reducing Risks (pt-stated)     Notes - Note edited  8/5/2019  3:51 PM by Mahin Penny    My Goal: Goal on hold (8/5/19) - I will begin cutting back on cigarette smoking (1ppd--->12 per day----> 5 per day now as of 6/11/19--->4 per day now 7/9/19) and continue working toward complete tobacco cessation    What I need to meet my goal: Nothing needed at this time, got nicotrol inhaler    I plan to meet my goal by this date: Next Coaching Session    Divina has cut back to 4!   #1-waking  #2 (AM) and #3 (lunch) at work  #4 arrival home                  INTERVENTION:   Divina reports struggling to follow through with changes.  Can have days where blood sugars are good and then times of eating high carbohydrate meals and noticing large post prandial rises.  Has some drops in between meals; likely the basal insulin and recommend decreasing from 19 to 17 units/day.  Fasting blood sugars are in target.  It appears the higher numbers are related to larger meals and the post prandial rise that Ozempic is not able to cover enough.  Set diet goals.  Patient's number one goal is stopping smoking (plans for the beginning of November).  If stopping smoking, may need less basal insulin.  Divina is going to try to do food logs / journaling again.   Has a new cell phone; sent this to  [facility] per patient's request.   PA team sent in the LMN for the personal FreeStyle Yash again to secondary insurance [facility]; trying to appeal and get this covered.  Increased testings supplies again incase the continuous glucose monitor cannot be appealed.     Diabetes knowledge and skills assessment:   Patient is knowledgeable in diabetes management concepts related to: Healthy Eating, Being Active, Monitoring, Taking Medication, Problem Solving, Reducing Risks and Healthy Coping  Patient needs further education on the following diabetes management concepts: Healthy Eating, Being Active, Reducing Risks and Healthy Coping  Based on learning assessment above, most appropriate setting for further diabetes education would be: Individual setting.    Education provided today on:  AADE Self-Care Behaviors:  Healthy Eating: consistency in amount, composition, and timing of food intake and portion control  Healthy Coping: benefits of making appropriate lifestyle changes    CGM-specific education:   Use of trends and graphs for pattern management and problem solving    Pt verbalized understanding of concepts discussed and recommendations provided today.       Education Materials Provided:  No new materials provided today    PLAN:  See Patient Instructions for co-developed, patient-stated behavior change goals.  AVS printed and provided to patient today. See Follow-Up section for recommended follow-up.    Dolly Riley RD, LD, CDE  Diabetes Education    Time Spent: 30 minutes  Encounter Type: Individual    Any diabetes medication dose changes were made via the CDE Protocol and Collaborative Practice Agreement with the patient's referring provider. A copy of this encounter was shared with the provider.

## 2019-10-15 NOTE — Clinical Note
Ryan Lazcano, Overall her blood sugars are fair. She just struggles with follow through.  Has meals where she overeats / snacks etc. Doesn't like exercise.  Trying to set small goals, she just doesn't follow through.  She seems down these past few months.  Thanks, Katiana

## 2019-10-17 DIAGNOSIS — Z79.4 TYPE 2 DIABETES MELLITUS WITH STAGE 3 CHRONIC KIDNEY DISEASE, WITH LONG-TERM CURRENT USE OF INSULIN (H): ICD-10-CM

## 2019-10-17 DIAGNOSIS — E11.22 TYPE 2 DIABETES MELLITUS WITH STAGE 3 CHRONIC KIDNEY DISEASE, WITH LONG-TERM CURRENT USE OF INSULIN (H): ICD-10-CM

## 2019-10-17 DIAGNOSIS — N18.30 CKD (CHRONIC KIDNEY DISEASE) STAGE 3, GFR 30-59 ML/MIN (H): ICD-10-CM

## 2019-10-17 DIAGNOSIS — N18.30 TYPE 2 DIABETES MELLITUS WITH STAGE 3 CHRONIC KIDNEY DISEASE, WITH LONG-TERM CURRENT USE OF INSULIN (H): ICD-10-CM

## 2019-10-17 DIAGNOSIS — F25.0 SCHIZOAFFECTIVE DISORDER, BIPOLAR TYPE (H): ICD-10-CM

## 2019-10-17 LAB
ALBUMIN SERPL-MCNC: 3.4 G/DL (ref 3.4–5)
ANION GAP SERPL CALCULATED.3IONS-SCNC: 3 MMOL/L (ref 3–14)
BASOPHILS # BLD AUTO: 0.1 10E9/L (ref 0–0.2)
BASOPHILS NFR BLD AUTO: 0.5 %
BUN SERPL-MCNC: 22 MG/DL (ref 7–30)
CALCIUM SERPL-MCNC: 9.2 MG/DL (ref 8.5–10.1)
CHLORIDE SERPL-SCNC: 106 MMOL/L (ref 94–109)
CO2 SERPL-SCNC: 27 MMOL/L (ref 20–32)
CREAT SERPL-MCNC: 1.29 MG/DL (ref 0.52–1.04)
CREAT UR-MCNC: 242 MG/DL
DIFFERENTIAL METHOD BLD: ABNORMAL
EOSINOPHIL # BLD AUTO: 0.3 10E9/L (ref 0–0.7)
EOSINOPHIL NFR BLD AUTO: 1.6 %
ERYTHROCYTE [DISTWIDTH] IN BLOOD BY AUTOMATED COUNT: 13.4 % (ref 10–15)
GFR SERPL CREATININE-BSD FRML MDRD: 54 ML/MIN/{1.73_M2}
GLUCOSE SERPL-MCNC: 186 MG/DL (ref 70–99)
HBA1C MFR BLD: 8.1 % (ref 0–5.6)
HCT VFR BLD AUTO: 40.3 % (ref 35–47)
HGB BLD-MCNC: 12.7 G/DL (ref 11.7–15.7)
LYMPHOCYTES # BLD AUTO: 3.8 10E9/L (ref 0.8–5.3)
LYMPHOCYTES NFR BLD AUTO: 24.8 %
MCH RBC QN AUTO: 30.8 PG (ref 26.5–33)
MCHC RBC AUTO-ENTMCNC: 31.5 G/DL (ref 31.5–36.5)
MCV RBC AUTO: 98 FL (ref 78–100)
MONOCYTES # BLD AUTO: 1.1 10E9/L (ref 0–1.3)
MONOCYTES NFR BLD AUTO: 7.3 %
NEUTROPHILS # BLD AUTO: 10.1 10E9/L (ref 1.6–8.3)
NEUTROPHILS NFR BLD AUTO: 65.8 %
PHOSPHATE SERPL-MCNC: 3.5 MG/DL (ref 2.5–4.5)
PLATELET # BLD AUTO: 339 10E9/L (ref 150–450)
POTASSIUM SERPL-SCNC: 4.7 MMOL/L (ref 3.4–5.3)
PROT UR-MCNC: 0.65 G/L
PROT/CREAT 24H UR: 0.27 G/G CR (ref 0–0.2)
PTH-INTACT SERPL-MCNC: 43 PG/ML (ref 18–80)
RBC # BLD AUTO: 4.12 10E12/L (ref 3.8–5.2)
SODIUM SERPL-SCNC: 136 MMOL/L (ref 133–144)
WBC # BLD AUTO: 15.4 10E9/L (ref 4–11)

## 2019-10-17 PROCEDURE — 83036 HEMOGLOBIN GLYCOSYLATED A1C: CPT | Performed by: NURSE PRACTITIONER

## 2019-10-17 PROCEDURE — 80069 RENAL FUNCTION PANEL: CPT | Performed by: CLINICAL NURSE SPECIALIST

## 2019-10-17 PROCEDURE — 36415 COLL VENOUS BLD VENIPUNCTURE: CPT | Performed by: NURSE PRACTITIONER

## 2019-10-17 PROCEDURE — 83970 ASSAY OF PARATHORMONE: CPT | Performed by: CLINICAL NURSE SPECIALIST

## 2019-10-17 PROCEDURE — 85025 COMPLETE CBC W/AUTO DIFF WBC: CPT | Performed by: CLINICAL NURSE SPECIALIST

## 2019-10-17 PROCEDURE — 84156 ASSAY OF PROTEIN URINE: CPT | Performed by: INTERNAL MEDICINE

## 2019-10-17 NOTE — LETTER
November 7, 2019      Divina Delgadillo  [address] [address] [place] [address]              Dear Divina,    Your most recent lab results are attached.  Your kidney function is stable but not normal as you are aware. Please call or MyChart with questions or concerns.  [phone].      Sincerely,      Sánchez Dias DO    Division of Renal Diseases and Hypertension  [facility]  KW/gw    Component      Latest Ref Rng & Units 10/17/2019   Protein Random Urine      g/L 0.65   Protein Total Urine g/gr Creatinine      0 - 0.2 g/g Cr 0.27 (H)   Creatinine Urine      mg/dL 242                 Component      Latest Ref Rng & Units 10/17/2019   Sodium      133 - 144 mmol/L 136   Potassium      3.4 - 5.3 mmol/L 4.7   Chloride      94 - 109 mmol/L 106   Carbon Dioxide      20 - 32 mmol/L 27   Anion Gap      3 - 14 mmol/L 3   Glucose      70 - 99 mg/dL 186 (H)   Urea Nitrogen      7 - 30 mg/dL 22   Creatinine      0.52 - 1.04 mg/dL 1.29 (H)   GFR Estimate      >60 mL/min/1.73:m2 54 (L)   GFR Estimate If Black      >60 mL/min/1.73:m2 63   Calcium      8.5 - 10.1 mg/dL 9.2   Phosphorus      2.5 - 4.5 mg/dL 3.5   Albumin      3.4 - 5.0 g/dL 3.4   Parathyroid Hormone Intact      18 - 80 pg/mL 43

## 2019-10-22 ENCOUNTER — PATIENT OUTREACH (OUTPATIENT)
Dept: NURSING | Facility: CLINIC | Age: 34
End: 2019-10-22

## 2019-10-24 ENCOUNTER — TELEPHONE (OUTPATIENT)
Dept: GASTROENTEROLOGY | Facility: CLINIC | Age: 34
End: 2019-10-24

## 2019-10-24 ENCOUNTER — OFFICE VISIT (OUTPATIENT)
Dept: FAMILY MEDICINE | Facility: CLINIC | Age: 34
End: 2019-10-24
Payer: MEDICARE

## 2019-10-24 VITALS
SYSTOLIC BLOOD PRESSURE: 110 MMHG | BODY MASS INDEX: 31.96 KG/M2 | HEIGHT: 64 IN | WEIGHT: 187.2 LBS | TEMPERATURE: 98.3 F | OXYGEN SATURATION: 100 % | DIASTOLIC BLOOD PRESSURE: 78 MMHG | HEART RATE: 91 BPM

## 2019-10-24 DIAGNOSIS — E78.5 HYPERLIPIDEMIA LDL GOAL <100: ICD-10-CM

## 2019-10-24 DIAGNOSIS — E11.22 TYPE 2 DIABETES MELLITUS WITH STAGE 3 CHRONIC KIDNEY DISEASE, WITH LONG-TERM CURRENT USE OF INSULIN (H): Primary | ICD-10-CM

## 2019-10-24 DIAGNOSIS — K76.0 FATTY LIVER: Primary | ICD-10-CM

## 2019-10-24 DIAGNOSIS — N18.30 TYPE 2 DIABETES MELLITUS WITH STAGE 3 CHRONIC KIDNEY DISEASE, WITH LONG-TERM CURRENT USE OF INSULIN (H): Primary | ICD-10-CM

## 2019-10-24 DIAGNOSIS — N18.30 CKD (CHRONIC KIDNEY DISEASE) STAGE 3, GFR 30-59 ML/MIN (H): ICD-10-CM

## 2019-10-24 DIAGNOSIS — Z79.4 TYPE 2 DIABETES MELLITUS WITH STAGE 3 CHRONIC KIDNEY DISEASE, WITH LONG-TERM CURRENT USE OF INSULIN (H): Primary | ICD-10-CM

## 2019-10-24 DIAGNOSIS — K76.0 NAFL (NONALCOHOLIC FATTY LIVER): ICD-10-CM

## 2019-10-24 DIAGNOSIS — E66.01 MORBID OBESITY (H): ICD-10-CM

## 2019-10-24 PROCEDURE — 99214 OFFICE O/P EST MOD 30 MIN: CPT | Performed by: NURSE PRACTITIONER

## 2019-10-24 ASSESSMENT — MIFFLIN-ST. JEOR: SCORE: 1539.13

## 2019-10-24 NOTE — PROGRESS NOTES
Subjective     Divina Delgadillo is a 34 year old female who presents to clinic today for the following health issues: diabetes follow up, states numbers running high again, reports that she over eats sometimes, still not exercising regularly, got a new bike but never used it, signed up for fitness club membership, but her caregiver Lorin had to cancel it because Divina is not exercising there. No motivation. Last A1C slightly worse then in July, 3 months ago. During summer she had CGM Yash which helped her to stay more motivated and she monitored her blood sugars more frequently and followed better diet, but unfortunately FreeStyle Yash is not covered any more.     HPI   Diabetes Follow-up      How often are you checking your blood sugar? Three times daily    What time of day are you checking your blood sugars (select all that apply)?  Before and after meals    Have you had any blood sugars above 200?  Yes      Have you had any blood sugars below 70?  Yes- one time blood sugar was 58 from not eating     What symptoms do you notice when your blood sugar is low?  Shaky, Dizzy and Weak    What concerns do you have today about your diabetes? None     Do you have any of these symptoms? (Select all that apply)  No numbness or tingling in feet.  No redness, sores or blisters on feet.  No complaints of excessive thirst.  No reports of blurry vision.  No significant changes to weight.     Have you had a diabetic eye exam in the last 12 months? YES    Diabetes Management Resources    Hyperlipidemia Follow-Up      Are you having any of the following symptoms? (Select all that apply)  No complaints of shortness of breath, chest pain or pressure.  No increased sweating or nausea with activity.  No left-sided neck or arm pain.  No complaints of pain in calves when walking 1-2 blocks.    Are you regularly taking any medication or supplement to lower your cholesterol?   Yes- Lipitor     Are you having muscle aches or other side  "effects that you think could be caused by your cholesterol lowering medication?  No    Hypertension Follow-up      Do you check your blood pressure regularly outside of the clinic? Yes     Are you following a low salt diet? No    Are your blood pressures ever more than 140 on the top number (systolic) OR more   than 90 on the bottom number (diastolic), for example 140/90? Yes but not recently     BP Readings from Last 2 Encounters:   10/24/19 110/78   07/16/19 118/63     Hemoglobin A1C (%)   Date Value   10/17/2019 8.1 (H)   07/03/2019 7.7 (H)     LDL Cholesterol Calculated (mg/dL)   Date Value   07/12/2019 192 (H)   07/08/2019 159 (H)     Reviewed and updated as needed this visit by Provider         Review of Systems   ROS COMP: Constitutional, HEENT, cardiovascular, pulmonary, gi and gu systems are negative, except as otherwise noted.      Objective    /78 (BP Location: Right arm, Patient Position: Sitting, Cuff Size: Adult Regular)   Pulse 91   Temp 98.3  F (36.8  C) (Tympanic)   Ht 1.626 m (5' 4\")   Wt 84.9 kg (187 lb 3.2 oz)   SpO2 100%   BMI 32.13 kg/m    Body mass index is 32.13 kg/m .  Physical Exam   GENERAL: healthy, alert and no distress  EYES: Eyes grossly normal to inspection, PERRL and conjunctivae and sclerae normal  RESP: lungs clear to auscultation - no rales, rhonchi or wheezes  CV: regular rate and rhythm, normal S1 S2, no S3 or S4, no murmur, click or rub, no peripheral edema and peripheral pulses strong  MS: no gross musculoskeletal defects noted, no edema  NEURO: Normal strength and tone, mentation intact and speech normal  PSYCH: mentation appears normal, affect normal/bright  Diabetic foot exam: normal DP and PT pulses, no trophic changes or ulcerative lesions and normal sensory exam    Diagnostic Test Results:  Labs reviewed in Epic  Results for orders placed or performed in visit on 10/17/19   **A1C FUTURE anytime   Result Value Ref Range    Hemoglobin A1C 8.1 (H) 0 - 5.6 % "   Renal panel   Result Value Ref Range    Sodium 136 133 - 144 mmol/L    Potassium 4.7 3.4 - 5.3 mmol/L    Chloride 106 94 - 109 mmol/L    Carbon Dioxide 27 20 - 32 mmol/L    Anion Gap 3 3 - 14 mmol/L    Glucose 186 (H) 70 - 99 mg/dL    Urea Nitrogen 22 7 - 30 mg/dL    Creatinine 1.29 (H) 0.52 - 1.04 mg/dL    GFR Estimate 54 (L) >60 mL/min/[1.73_m2]    GFR Estimate If Black 63 >60 mL/min/[1.73_m2]    Calcium 9.2 8.5 - 10.1 mg/dL    Phosphorus 3.5 2.5 - 4.5 mg/dL    Albumin 3.4 3.4 - 5.0 g/dL   Protein  random urine with Creat Ratio   Result Value Ref Range    Protein Random Urine 0.65 g/L    Protein Total Urine g/gr Creatinine 0.27 (H) 0 - 0.2 g/g Cr   Parathyroid Hormone Intact   Result Value Ref Range    Parathyroid Hormone Intact 43 18 - 80 pg/mL   CBC with platelets and differential   Result Value Ref Range    WBC 15.4 (H) 4.0 - 11.0 10e9/L    RBC Count 4.12 3.8 - 5.2 10e12/L    Hemoglobin 12.7 11.7 - 15.7 g/dL    Hematocrit 40.3 35.0 - 47.0 %    MCV 98 78 - 100 fl    MCH 30.8 26.5 - 33.0 pg    MCHC 31.5 31.5 - 36.5 g/dL    RDW 13.4 10.0 - 15.0 %    Platelet Count 339 150 - 450 10e9/L    % Neutrophils 65.8 %    % Lymphocytes 24.8 %    % Monocytes 7.3 %    % Eosinophils 1.6 %    % Basophils 0.5 %    Absolute Neutrophil 10.1 (H) 1.6 - 8.3 10e9/L    Absolute Lymphocytes 3.8 0.8 - 5.3 10e9/L    Absolute Monocytes 1.1 0.0 - 1.3 10e9/L    Absolute Eosinophils 0.3 0.0 - 0.7 10e9/L    Absolute Basophils 0.1 0.0 - 0.2 10e9/L    Diff Method Automated Method    Creatinine urine calculation only   Result Value Ref Range    Creatinine Urine 242 mg/dL           Assessment & Plan     1. Type 2 diabetes mellitus with stage 3 chronic kidney disease, with long-term current use of insulin (H)  -still needs improvement  -needs to exercise more, follow diabetic diet more closely  -recommended Divina to get smart watch, like FIT BIT that will hopefully help her to be more motivated and exercise more regularly, she is very excited  "about about trying smart watch, because when she used FreeStyle Yash it helped Divina to pay more attention to her blood sugars and follow healthier diet, so hopefully she will get same type motivation and will start exercising  -continue Ozempic  -she started having high blood sugars again when decreased Lantus to 17 units   - insulin glargine (LANTUS SOLOSTAR) 100 UNIT/ML pen; Inject 19 units once daily   (allow max dose 25 units/day for titration)  Dispense: 15 mL; Refill: 2    2. Morbid obesity (H)  -recommended healthy diet, exercise and work on weight loss    3. NAFL (nonalcoholic fatty liver)  -stable, liver function stable     4. Hyperlipidemia LDL goal <100  -continue Lipitor 10 mg daily     5. CKD (chronic kidney disease) stage 3, GFR 30-59 ml/min (H)  -stable, following nephrologist  -recent renal function results improved and at her baseline  -recommended to stay well hydrated       BMI:   Estimated body mass index is 32.13 kg/m  as calculated from the following:    Height as of this encounter: 1.626 m (5' 4\").    Weight as of this encounter: 84.9 kg (187 lb 3.2 oz).   Weight management plan: Discussed healthy diet and exercise guidelines        See Patient Instructions    Return in about 3 months (around 1/24/2020) for Routine Visit.    VERONIQUE Spears [facility]      " no

## 2019-10-24 NOTE — TELEPHONE ENCOUNTER
Health Call Center    Phone Message    May a detailed message be left on voicemail: no    Reason for Call: Symptoms or Concerns     If patient has red-flag symptoms, warm transfer to triage line    Current symptom or concern: Patients foster mother Lorin states they were unable to complete the biopsy because something else needed to be attached to the order. Writer called Tamara in radiology who states a surgical pathology is needed and to call her so she can explain at [phone].      Action Taken: Message routed to:  [facility] ([facility] Hepatology

## 2019-10-24 NOTE — PATIENT INSTRUCTIONS
Exercise more  Invest into FIT BIT or other smart watch that will help you to stay motivated.    Increase Lantus to 19 units, if numbers starts running low, or below 100 please go back on 17 units daily AM    Continue Ozempic      Hemoglobin A1C   Date Value Ref Range Status   10/17/2019 8.1 (H) 0 - 5.6 % Final     Comment:     Normal <5.7% Prediabetes 5.7-6.4%  Diabetes 6.5% or higher - adopted from ADA   consensus guidelines.

## 2019-10-24 NOTE — TELEPHONE ENCOUNTER
Additional pathology order placed for pt's biopsy per Sonja Ximena. Spoke with IR who confirmed the order was correct and that pt's foster mother, Lorin, could call IR scheduling to get pt schedule for liver biopsy. Notified Lorin that order was placed and pt could now be scheduled. Lorin verbalized understanding of information.

## 2019-10-25 ASSESSMENT — ASTHMA QUESTIONNAIRES: ACT_TOTALSCORE: 24

## 2019-11-09 DIAGNOSIS — J30.2 CHRONIC SEASONAL ALLERGIC RHINITIS: ICD-10-CM

## 2019-11-11 NOTE — TELEPHONE ENCOUNTER
"Requested Prescriptions   Pending Prescriptions Disp Refills     loratadine (CLARITIN) 10 MG tablet [Pharmacy Med Name: LORATADINE 10 MG TABLET] 90 tablet 0     Sig: Take 1 tablet (10 mg) by mouth every morning       Antihistamines Protocol Passed - 11/9/2019  8:32 AM        Passed - Patient is 3-64 years of age     Apply weight-based dosing for peds patients age 3 - 12 years of age.    Forward request to provider for patients under the age of 3 or over the age of 64.          Passed - Recent (12 mo) or future (30 days) visit within the authorizing provider's specialty     Patient has had an office visit with the authorizing provider or a provider within the authorizing providers department within the previous 12 mos or has a future within next 30 days. See \"Patient Info\" tab in inbasket, or \"Choose Columns\" in Meds & Orders section of the refill encounter.              Passed - Medication is active on med list        Last Written Prescription Date:  11/9/19  Last Fill Quantity: 90,  # refills: 0   Last office visit: 10/24/2019 with prescribing provider:  Jaleesa Velasquez     Future Office Visit:   Next 5 appointments (look out 90 days)    Nov 13, 2019  3:00 PM CST  PHYSICAL with VERONIQUE Bledsoe CNP  [facility]  Arrive at: [facility] [address] [facility] [id number]  [phone]           "

## 2019-11-12 RX ORDER — LORATADINE 10 MG/1
TABLET ORAL
Qty: 90 TABLET | Refills: 1 | Status: SHIPPED | OUTPATIENT
Start: 2019-11-12 | End: 2020-04-29

## 2019-11-12 NOTE — TELEPHONE ENCOUNTER
Prescription approved per [facility] Refill Protocol.  Bonnie Hawthorne RNC

## 2019-11-13 ENCOUNTER — OFFICE VISIT (OUTPATIENT)
Dept: FAMILY MEDICINE | Facility: CLINIC | Age: 34
End: 2019-11-13
Payer: MEDICARE

## 2019-11-13 ENCOUNTER — ANESTHESIA EVENT (OUTPATIENT)
Dept: SURGERY | Facility: AMBULATORY SURGERY CENTER | Age: 34
End: 2019-11-13

## 2019-11-13 ENCOUNTER — DOCUMENTATION ONLY (OUTPATIENT)
Dept: OTHER | Facility: CLINIC | Age: 34
End: 2019-11-13

## 2019-11-13 VITALS
HEIGHT: 64 IN | SYSTOLIC BLOOD PRESSURE: 136 MMHG | TEMPERATURE: 98.7 F | OXYGEN SATURATION: 98 % | DIASTOLIC BLOOD PRESSURE: 58 MMHG | HEART RATE: 92 BPM | BODY MASS INDEX: 31.76 KG/M2 | WEIGHT: 186 LBS

## 2019-11-13 DIAGNOSIS — C25.9 MALIGNANT NEOPLASM OF PANCREAS, UNSPECIFIED LOCATION OF MALIGNANCY (H): ICD-10-CM

## 2019-11-13 DIAGNOSIS — J45.21 MILD INTERMITTENT ASTHMA WITH ACUTE EXACERBATION: ICD-10-CM

## 2019-11-13 DIAGNOSIS — N18.30 TYPE 2 DIABETES MELLITUS WITH STAGE 3 CHRONIC KIDNEY DISEASE, WITH LONG-TERM CURRENT USE OF INSULIN (H): ICD-10-CM

## 2019-11-13 DIAGNOSIS — Z01.818 PREOP GENERAL PHYSICAL EXAM: Primary | ICD-10-CM

## 2019-11-13 DIAGNOSIS — Z79.4 TYPE 2 DIABETES MELLITUS WITH STAGE 3 CHRONIC KIDNEY DISEASE, WITH LONG-TERM CURRENT USE OF INSULIN (H): ICD-10-CM

## 2019-11-13 DIAGNOSIS — K76.0 NAFL (NONALCOHOLIC FATTY LIVER): ICD-10-CM

## 2019-11-13 DIAGNOSIS — E11.22 TYPE 2 DIABETES MELLITUS WITH STAGE 3 CHRONIC KIDNEY DISEASE, WITH LONG-TERM CURRENT USE OF INSULIN (H): ICD-10-CM

## 2019-11-13 LAB
ALBUMIN SERPL-MCNC: 3.7 G/DL (ref 3.4–5)
ALP SERPL-CCNC: 178 U/L (ref 40–150)
ALT SERPL W P-5'-P-CCNC: 41 U/L (ref 0–50)
ANION GAP SERPL CALCULATED.3IONS-SCNC: 2 MMOL/L (ref 3–14)
AST SERPL W P-5'-P-CCNC: 26 U/L (ref 0–45)
BILIRUB SERPL-MCNC: 0.2 MG/DL (ref 0.2–1.3)
BUN SERPL-MCNC: 29 MG/DL (ref 7–30)
CALCIUM SERPL-MCNC: 9.6 MG/DL (ref 8.5–10.1)
CHLORIDE SERPL-SCNC: 111 MMOL/L (ref 94–109)
CO2 SERPL-SCNC: 28 MMOL/L (ref 20–32)
CREAT SERPL-MCNC: 1.56 MG/DL (ref 0.52–1.04)
GFR SERPL CREATININE-BSD FRML MDRD: 43 ML/MIN/{1.73_M2}
GLUCOSE SERPL-MCNC: 98 MG/DL (ref 70–99)
INR PPP: 0.93 (ref 0.86–1.14)
POTASSIUM SERPL-SCNC: 4.7 MMOL/L (ref 3.4–5.3)
PROT SERPL-MCNC: 8.5 G/DL (ref 6.8–8.8)
SODIUM SERPL-SCNC: 141 MMOL/L (ref 133–144)

## 2019-11-13 PROCEDURE — 99214 OFFICE O/P EST MOD 30 MIN: CPT | Performed by: NURSE PRACTITIONER

## 2019-11-13 PROCEDURE — 85610 PROTHROMBIN TIME: CPT | Performed by: NURSE PRACTITIONER

## 2019-11-13 PROCEDURE — 80053 COMPREHEN METABOLIC PANEL: CPT | Performed by: NURSE PRACTITIONER

## 2019-11-13 PROCEDURE — 36415 COLL VENOUS BLD VENIPUNCTURE: CPT | Performed by: NURSE PRACTITIONER

## 2019-11-13 ASSESSMENT — MIFFLIN-ST. JEOR: SCORE: 1533.69

## 2019-11-13 NOTE — LETTER
November 14, 2019      Divina Delgadillo  [id number] [address] [place] [id number]        Dear ,    We are writing to inform you of your test results.  Kidney function slightly worse, the patient needs to drink more fluids, we discuss this every visit, recommend good hydration. Glucose level normal. INR normal. Okay for liver biopsy.     Resulted Orders   Comprehensive metabolic panel (BMP + Alb, Alk Phos, ALT, AST, Total. Bili, TP)   Result Value Ref Range    Sodium 141 133 - 144 mmol/L    Potassium 4.7 3.4 - 5.3 mmol/L    Chloride 111 (H) 94 - 109 mmol/L    Carbon Dioxide 28 20 - 32 mmol/L    Anion Gap 2 (L) 3 - 14 mmol/L    Glucose 98 70 - 99 mg/dL    Urea Nitrogen 29 7 - 30 mg/dL    Creatinine 1.56 (H) 0.52 - 1.04 mg/dL    GFR Estimate 43 (L) >60 mL/min/[1.73_m2]      Comment:      Non  GFR Calc  Starting 12/18/2018, serum creatinine based estimated GFR (eGFR) will be   calculated using the Chronic Kidney Disease Epidemiology Collaboration   (CKD-EPI) equation.      GFR Estimate If Black 50 (L) >60 mL/min/[1.73_m2]      Comment:       GFR Calc  Starting 12/18/2018, serum creatinine based estimated GFR (eGFR) will be   calculated using the Chronic Kidney Disease Epidemiology Collaboration   (CKD-EPI) equation.      Calcium 9.6 8.5 - 10.1 mg/dL    Bilirubin Total 0.2 0.2 - 1.3 mg/dL    Albumin 3.7 3.4 - 5.0 g/dL    Protein Total 8.5 6.8 - 8.8 g/dL    Alkaline Phosphatase 178 (H) 40 - 150 U/L    ALT 41 0 - 50 U/L    AST 26 0 - 45 U/L   INR   Result Value Ref Range    INR 0.93 0.86 - 1.14       If you have any questions or concerns, please call the clinic at the number listed above.       Sincerely,        VERONIQUE Spears CNP/bmc

## 2019-11-13 NOTE — PATIENT INSTRUCTIONS
Before Your Surgery      Call your surgeon if there is any change in your health. This includes signs of a cold or flu (such as a sore throat, runny nose, cough, rash or fever).    Do not smoke, drink alcohol or take over the counter medicine (unless your surgeon or primary care doctor tells you to) for the 24 hours before and after surgery.    If you take prescribed drugs: Follow your doctor s orders about which medicines to take and which to stop until after surgery.    Eating and drinking prior to surgery: follow the instructions from your surgeon    Take a shower or bath the night before surgery. Use the soap your surgeon gave you to gently clean your skin. If you do not have soap from your surgeon, use your regular soap. Do not shave or scrub the surgery site.  Wear clean pajamas and have clean sheets on your bed.     Labs today    Reduce Lantus to 15 units on the day of surgery    Hold Lisinopril

## 2019-11-13 NOTE — PROGRESS NOTES
[facility]  [address] [facility] [address]  [phone]  Dept: [phone]    PRE-OP EVALUATION:  Today's date: 2019    Divina Delgadillo (: 1985) presents for pre-operative evaluation assessment as requested by Dr. Guzman  She requires evaluation and anesthesia risk assessment prior to undergoing surgery/procedure for treatment of  Percutaneous Liver Biopsy    Proposed Surgery/ Procedure: Percutaneous Liver Biopsy  Date of Surgery/ Procedure: 19   Time of Surgery/ Procedure: 9:15 AM   Hospital/Surgical Facility: [facility]   Primary Physician: Jaleesa Velasquez  Type of Anesthesia Anticipated: Monitor anesthesia     Patient has a Health Care Directive or Living Will:  NO    1. NO  - Do you have a history of heart attack, stroke, stent, bypass or surgery on an artery in the head, neck, heart or legs?  2. NO - Do you ever have any pain or discomfort in your chest?  3. NO - Do you have a history of  Heart Failure?  4. NO - Are you troubled by shortness of breath when: walking on the level, up a slight hill or at night?  5. NO - Do you currently have a cold, bronchitis or other respiratory infection?  6. NO - Do you have a cough, shortness of breath or wheezing? Has a cough right now   7. NO - Do you sometimes get pains in the calves of your legs when you walk?  8. NO - Do you or anyone in your family have previous history of blood clots?  9. NO - Do you or does anyone in your family have a serious bleeding problem such as prolonged bleeding following surgeries or cuts?  10. NO - Have you ever had problems with anemia or been told to take iron pills?  11. NO - Have you had any abnormal blood loss such as black, tarry or bloody stools, or abnormal vaginal bleeding?  12. NO - Have you ever had a blood transfusion?  13. NO - Have you or any of your relatives ever had problems with anesthesia?  14. NO - Do you have sleep apnea, excessive snoring or daytime  drowsiness?  15. NO - Do you have any prosthetic heart valves?  16. NO - Do you have prosthetic joints?  17. NO - Is there any chance that you may be pregnant?      HPI:     HPI related to upcoming procedure: the patient with history of NALF, history malignant neoplasm of pancreas, uncontrolled, diabetes, CKD stage 3 scheduled for liver biopsy.       DEPRESSION - Patient has a long history of Depression of moderate severity requiring medication for control with recent symptoms being stable..Current symptoms of depression include weight gain.     DIABETES - Patient has a longstanding history of DiabetesType Type II . Patient is being treated with diet and insulin injections and denies significant side effects. Control has been poor. Complicating factors include but are not limited to: hypertension, hyperlipidemia and chronic kidney disease.     HYPERLIPIDEMIA - Patient has a long history of significant Hyperlipidemia requiring medication for treatment with recent good control. Patient reports no problems or side effects with the medication.     HYPERTENSION - Patient has longstanding history of HTN , currently denies any symptoms referable to elevated blood pressure. Specifically denies chest pain, palpitations, dyspnea, orthopnea, PND or peripheral edema. Blood pressure readings have been in normal range. Current medication regimen is as listed below. Patient denies any side effects of medication.     RENAL INSUFFICIENCY - Patient has a longstanding history of moderate-severe chronic renal insufficiency. Last Cr 1.29      MEDICAL HISTORY:     Patient Active Problem List    Diagnosis Date Noted     ARF (acute renal failure) (H) 03/23/2019     Priority: Medium     Acute-on-chronic kidney injury (H) 03/22/2019     Priority: Medium     Near syncope 03/22/2019     Priority: Medium     Leukocytosis 03/22/2019     Priority: Medium     Acquired asplenia 03/22/2019     Priority: Medium     Acute pain of left knee 03/22/2019  "    Priority: Medium     H/O leukocytosis 11/27/2018     Priority: Medium     Due to spleen removal       Nicotine abuse 08/13/2018     Priority: Medium     Mild intermittent asthma with acute exacerbation 01/16/2018     Priority: Medium     Morbid obesity (H) 01/09/2018     Priority: Medium     IUD (intrauterine device) in place 07/26/2017     Priority: Medium     Mirena IUD inserted in office with premed 7/26/2017         Cervical high risk HPV (human papillomavirus) test positive 06/20/2017     Priority: Medium     6/15/2017 Pap:NIL, +HR HPV \"other\" (not 16/18). Plan to repeat Pap+HPV in 1 year  6/14/2018 Pap: NIL/neg HPV. Plan Pap+HPV in 3 years (2021)       DVT (deep venous thrombosis) (H) 02/27/2017     Priority: Medium     CKD (chronic kidney disease) stage 3, GFR 30-59 ml/min (H) 02/27/2017     Priority: Medium     Malignant neoplasm of pancreas, unspecified location of malignancy (H) 02/27/2017     Priority: Medium     Type 2 diabetes mellitus with stage 3 chronic kidney disease, with long-term current use of insulin (H) 02/27/2017     Priority: Medium     Bipolar disorder (H) 02/27/2017     Priority: Medium     Hyperlipidemia LDL goal <100 10/31/2010     Priority: Medium     Essential hypertension 06/13/2008     Priority: Medium     NAFL (nonalcoholic fatty liver) 04/11/2006     Priority: Medium     See flowsheet for hepatic panel.   Stopped zocor, was on godon as well had right upper quandrant ultrasound and ct  ? Psych med related vs SAHNI       Esophageal reflux 04/14/2005     Priority: Medium     GERD        Past Medical History:   Diagnosis Date     Cervical high risk HPV (human papillomavirus) test positive 6/20/2017     Depressive disorder, not elsewhere classified      DVT (deep venous thrombosis) (H)      Dysmetabolic syndrome X      Esophageal reflux     GERD     Mild intermittent asthma      Other abnormal heart sounds     Heart murmur     Other specified types of schizophrenia, unspecified " condition      Pancreatic cancer (H)     left adrenal gland, partial pancreas, and spleen removed; no chemo or radiation.      Type II or unspecified type diabetes mellitus without mention of complication, not stated as uncontrolled      Unspecified disorder of tympanic membrane      Past Surgical History:   Procedure Laterality Date     ADRENALECTOMY       PANCREATECTOMY PARTIAL       SPLENECTOMY       SURGICAL HISTORY OF -   10/1/2000    Tympanoplasty     SURGICAL HISTORY OF -   10/1/2000    Tonsillectomy     Current Outpatient Medications   Medication Sig Dispense Refill     amLODIPine (NORVASC) 5 MG tablet Take 2 tablets (10 mg) by mouth daily (Patient taking differently: Take 5 mg by mouth daily ) 180 tablet 1     ARIPiprazole (ABILIFY) 30 MG tablet Take 30 mg by mouth daily       atorvastatin (LIPITOR) 10 MG tablet Take 1 tablet (10 mg) by mouth daily 30 tablet 5     blood glucose (ACCU-CHEK GUIDE) test strip Use to test blood sugar 3 times daily (accu chek GUIDE). 150 strip 11     blood glucose monitoring (ACCU-CHEK FASTCLIX) lancets Use to test blood sugar 3 times daily 102 each 11     carvedilol (COREG) 6.25 MG tablet Increase Coreg to 12.5 mg AM and continue 6.25 mg PM 90 tablet 2     Clozapine (CLOZARIL) 200 MG tablet Take 200 mg by mouth 2 times daily       FLUoxetine (PROZAC) 20 MG capsule Take 20 mg by mouth daily       insulin glargine (LANTUS SOLOSTAR) 100 UNIT/ML pen Inject 19 units once daily   (allow max dose 25 units/day for titration) 15 mL 2     lamoTRIgine (LAMICTAL) 25 MG tablet 100 mg at bedtime  0     lisinopril (PRINIVIL/ZESTRIL) 10 MG tablet Take 0.5 tablets (5 mg) by mouth daily 90 tablet 3     ranitidine (ZANTAC) 300 MG tablet Take 1 tablet (300 mg) by mouth At Bedtime 90 tablet 2     ULTICARE MINI 31G X 6 MM insulin pen needle Use 1 pen needles daily 100 each 11     albuterol (PROAIR HFA/PROVENTIL HFA/VENTOLIN HFA) 108 (90 BASE) MCG/ACT Inhaler Inhale 2 puffs into the lungs every 6  "hours as needed for shortness of breath / dyspnea or wheezing (Patient not taking: Reported on 4/15/2019) 1 Inhaler 3     loratadine (CLARITIN) 10 MG tablet Take 1 tablet (10 mg) by mouth every morning (Patient not taking: Reported on 2019) 90 tablet 1     montelukast (SINGULAIR) 10 MG tablet Take 1 tablet (10 mg) by mouth At Bedtime (Patient not taking: Reported on 2019) 90 tablet 1     nicotine (NICOTROL) 10 MG inhaler Inhale 6-10 Cartridges into the lungs daily as needed for smoking cessation (Patient not taking: Reported on 2019) 168 each 0     omeprazole (PRILOSEC) 20 MG DR capsule Take 1 capsule (20 mg) by mouth daily (Patient not taking: Reported on 10/24/2019) 60 capsule 5     semaglutide (OZEMPIC) 1 MG/DOSE pen Inject 1 mg Subcutaneous every 7 days (Patient not taking: Reported on 2019) 9 mL 3     OTC products: none    Allergies   Allergen Reactions     Acetaminophen Other (See Comments)     pt previously tried to overdose on it, PMD does not want pt taking per pt.      Latex Rash      Latex Allergy: YES: Precautions to take:     Social History     Tobacco Use     Smoking status: Current Every Day Smoker     Packs/day: 0.75     Years: 4.00     Pack years: 3.00     Types: Cigarettes     Last attempt to quit: 2019     Years since quittin.3     Smokeless tobacco: Never Used     Tobacco comment: quit 1 week ago, recommended Nicotine replacement with nicotine inhalers    Substance Use Topics     Alcohol use: No     History   Drug Use No       REVIEW OF SYSTEMS:   Constitutional, HEENT, cardiovascular, pulmonary, gi and gu systems are negative, except as otherwise noted.    EXAM:   /58 (BP Location: Right arm, Patient Position: Sitting, Cuff Size: Adult Regular)   Pulse 92   Temp 98.7  F (37.1  C) (Tympanic)   Ht 1.626 m (5' 4\")   Wt 84.4 kg (186 lb)   SpO2 98%   BMI 31.93 kg/m      GENERAL APPEARANCE: healthy, alert and no distress     EYES: EOMI, PERRL     HENT: ear " canals and TM's normal and nose and mouth without ulcers or lesions     NECK: no adenopathy, no asymmetry, masses, or scars and thyroid normal to palpation     RESP: lungs clear to auscultation - no rales, rhonchi or wheezes     CV: regular rates and rhythm, normal S1 S2, no S3 or S4 and no murmur, click or rub     ABDOMEN:  soft, nontender, no HSM or masses and bowel sounds normal     MS: extremities normal- no gross deformities noted, no evidence of inflammation in joints, FROM in all extremities.     SKIN: no suspicious lesions or rashes     NEURO: Normal strength and tone, sensory exam grossly normal, mentation intact and speech normal     PSYCH: mentation appears normal. and affect normal/bright     LYMPHATICS: No cervical adenopathy    DIAGNOSTICS:     EKG: Not indicated due to non-vascular surgery and low risk of event (age <65 and without cardiac risk factors)  Labs Drawn and in Process:   Unresulted Labs Ordered in the Past 30 Days of this Admission     No orders found from 10/14/2019 to 11/14/2019.          Recent Labs   Lab Test 10/17/19  0924 09/24/19  1534  07/16/19  1019  07/03/19  1615  01/30/19  1129   HGB 12.7 12.2   < > 13.6  --   --    < > 13.2    416   < > 440  --   --    < > 355   INR  --   --   --  0.90  --   --   --  0.95    139  --  139  --  136   < > 133   POTASSIUM 4.7 4.9  --  4.7  --  4.5   < > 5.1   CR 1.29* 1.29*  --  1.41*   < > 1.98*   < > 1.56*   A1C 8.1*  --   --   --   --  7.7*   < >  --     < > = values in this interval not displayed.        IMPRESSION:   Reason for surgery/procedure: NALF  Diagnosis/reason for consult: pre-op evaluation     The proposed surgical procedure is considered LOW risk.    REVISED CARDIAC RISK INDEX  The patient has the following serious cardiovascular risks for perioperative complications such as (MI, PE, VFib and 3  AV Block):  Diabetes Mellitus (on Insulin)  INTERPRETATION: 1 risks: Class II (low risk - 0.9% complication rate)    The  patient has the following additional risks for perioperative complications:  No identified additional risks  Morbid obesity      ICD-10-CM    1. Preop general physical exam Z01.818 Comprehensive metabolic panel (BMP + Alb, Alk Phos, ALT, AST, Total. Bili, TP)     INR   2. Mild intermittent asthma with acute exacerbation J45.21    3. Malignant neoplasm of pancreas, unspecified location of malignancy (H) C25.9 Comprehensive metabolic panel (BMP + Alb, Alk Phos, ALT, AST, Total. Bili, TP)     INR   4. NAFL (nonalcoholic fatty liver) K76.0 Comprehensive metabolic panel (BMP + Alb, Alk Phos, ALT, AST, Total. Bili, TP)     INR   5. Type 2 diabetes mellitus with stage 3 chronic kidney disease, with long-term current use of insulin (H) E11.22 Comprehensive metabolic panel (BMP + Alb, Alk Phos, ALT, AST, Total. Bili, TP)    N18.3     Z79.4        RECOMMENDATIONS:       Cardiovascular Risk  Performs 4 METs exercise without symptoms (Light housework (dusting, washing dishes), Climb a flight of stairs and Walk on level ground at 15 minutes per mile (4 miles/hour)) .   Patient is already on a Beta Blocker. Continue Betablocker therapy after surgery, using Beta blocker order set as necessary for NPO status.      --Patient is to take all scheduled medications on the day of surgery EXCEPT for modifications listed below.    Diabetes Medication Use    -----Hold usual oral and non-insulin diabetic meds (e.g. Metformin, Actos, Glipizide) while NPO.   -----Take 80% of long acting insulin (e.g. Lantus, NPH) while NPO (fasting)      ACE Inhibitor or Angiotensin Receptor Blocker (ARB) Use  Ace inhibitor or Angiotensin Receptor Blocker (ARB) and should HOLD this medication for the 24 hours prior to surgery.      APPROVAL GIVEN to proceed with proposed procedure, without further diagnostic evaluation       Signed Electronically by: VERONIQUE Spears CNP    Copy of this evaluation report is provided to requesting physician.    [facility]  Preop Guidelines    Revised Cardiac Risk Index

## 2019-11-14 ENCOUNTER — ANESTHESIA (OUTPATIENT)
Dept: SURGERY | Facility: AMBULATORY SURGERY CENTER | Age: 34
End: 2019-11-14

## 2019-11-14 ENCOUNTER — ANCILLARY PROCEDURE (OUTPATIENT)
Dept: RADIOLOGY | Facility: AMBULATORY SURGERY CENTER | Age: 34
End: 2019-11-14
Attending: PHYSICIAN ASSISTANT
Payer: MEDICARE

## 2019-11-14 ENCOUNTER — HOSPITAL ENCOUNTER (OUTPATIENT)
Facility: AMBULATORY SURGERY CENTER | Age: 34
End: 2019-11-14
Attending: PHYSICIAN ASSISTANT
Payer: MEDICARE

## 2019-11-14 VITALS
HEIGHT: 64 IN | HEART RATE: 86 BPM | WEIGHT: 186 LBS | DIASTOLIC BLOOD PRESSURE: 55 MMHG | SYSTOLIC BLOOD PRESSURE: 113 MMHG | OXYGEN SATURATION: 100 % | TEMPERATURE: 98 F | BODY MASS INDEX: 31.76 KG/M2 | RESPIRATION RATE: 17 BRPM

## 2019-11-14 DIAGNOSIS — K76.0 FATTY LIVER: ICD-10-CM

## 2019-11-14 LAB
B-HCG SERPL-ACNC: <1 IU/L (ref 0–5)
BASOPHILS # BLD AUTO: 0.2 10E9/L (ref 0–0.2)
BASOPHILS NFR BLD AUTO: 0.9 %
DIFFERENTIAL METHOD BLD: ABNORMAL
EOSINOPHIL # BLD AUTO: 0.2 10E9/L (ref 0–0.7)
EOSINOPHIL NFR BLD AUTO: 1.3 %
ERYTHROCYTE [DISTWIDTH] IN BLOOD BY AUTOMATED COUNT: 13.3 % (ref 10–15)
GLUCOSE BLDC GLUCOMTR-MCNC: 114 MG/DL (ref 70–99)
GLUCOSE BLDC GLUCOMTR-MCNC: 129 MG/DL (ref 70–99)
HCT VFR BLD AUTO: 41.7 % (ref 35–47)
HGB BLD-MCNC: 13.6 G/DL (ref 11.7–15.7)
IMM GRANULOCYTES # BLD: 0.1 10E9/L (ref 0–0.4)
IMM GRANULOCYTES NFR BLD: 0.7 %
LYMPHOCYTES # BLD AUTO: 4.4 10E9/L (ref 0.8–5.3)
LYMPHOCYTES NFR BLD AUTO: 24.3 %
MCH RBC QN AUTO: 31.1 PG (ref 26.5–33)
MCHC RBC AUTO-ENTMCNC: 32.6 G/DL (ref 31.5–36.5)
MCV RBC AUTO: 95 FL (ref 78–100)
MONOCYTES # BLD AUTO: 1.1 10E9/L (ref 0–1.3)
MONOCYTES NFR BLD AUTO: 6.1 %
NEUTROPHILS # BLD AUTO: 12 10E9/L (ref 1.6–8.3)
NEUTROPHILS NFR BLD AUTO: 66.7 %
NRBC # BLD AUTO: 0 10*3/UL
NRBC BLD AUTO-RTO: 0 /100
PLATELET # BLD AUTO: 337 10E9/L (ref 150–450)
RBC # BLD AUTO: 4.37 10E12/L (ref 3.8–5.2)
WBC # BLD AUTO: 18 10E9/L (ref 4–11)

## 2019-11-14 PROCEDURE — 88307 TISSUE EXAM BY PATHOLOGIST: CPT | Performed by: PHYSICIAN ASSISTANT

## 2019-11-14 PROCEDURE — 88342 IMHCHEM/IMCYTCHM 1ST ANTB: CPT | Performed by: PHYSICIAN ASSISTANT

## 2019-11-14 PROCEDURE — 88313 SPECIAL STAINS GROUP 2: CPT | Performed by: PHYSICIAN ASSISTANT

## 2019-11-14 RX ORDER — SODIUM CHLORIDE, SODIUM LACTATE, POTASSIUM CHLORIDE, CALCIUM CHLORIDE 600; 310; 30; 20 MG/100ML; MG/100ML; MG/100ML; MG/100ML
INJECTION, SOLUTION INTRAVENOUS CONTINUOUS
Status: DISCONTINUED | OUTPATIENT
Start: 2019-11-14 | End: 2019-11-15 | Stop reason: HOSPADM

## 2019-11-14 RX ORDER — DEXTROSE MONOHYDRATE 25 G/50ML
25-50 INJECTION, SOLUTION INTRAVENOUS
Status: DISCONTINUED | OUTPATIENT
Start: 2019-11-14 | End: 2019-11-15 | Stop reason: HOSPADM

## 2019-11-14 RX ORDER — GABAPENTIN 300 MG/1
300 CAPSULE ORAL ONCE
Status: COMPLETED | OUTPATIENT
Start: 2019-11-14 | End: 2019-11-14

## 2019-11-14 RX ORDER — MEPERIDINE HYDROCHLORIDE 25 MG/ML
12.5 INJECTION INTRAMUSCULAR; INTRAVENOUS; SUBCUTANEOUS
Status: DISCONTINUED | OUTPATIENT
Start: 2019-11-14 | End: 2019-11-15 | Stop reason: HOSPADM

## 2019-11-14 RX ORDER — LIDOCAINE 40 MG/G
CREAM TOPICAL
Status: DISCONTINUED | OUTPATIENT
Start: 2019-11-14 | End: 2019-11-15 | Stop reason: HOSPADM

## 2019-11-14 RX ORDER — SODIUM CHLORIDE 9 MG/ML
INJECTION, SOLUTION INTRAVENOUS CONTINUOUS
Status: DISCONTINUED | OUTPATIENT
Start: 2019-11-14 | End: 2019-11-15 | Stop reason: HOSPADM

## 2019-11-14 RX ORDER — OXYCODONE HYDROCHLORIDE 5 MG/1
5 TABLET ORAL EVERY 4 HOURS PRN
Status: DISCONTINUED | OUTPATIENT
Start: 2019-11-14 | End: 2019-11-15 | Stop reason: HOSPADM

## 2019-11-14 RX ORDER — NICOTINE POLACRILEX 4 MG
15-30 LOZENGE BUCCAL
Status: DISCONTINUED | OUTPATIENT
Start: 2019-11-14 | End: 2019-11-15 | Stop reason: HOSPADM

## 2019-11-14 RX ORDER — PROPOFOL 10 MG/ML
INJECTION, EMULSION INTRAVENOUS PRN
Status: DISCONTINUED | OUTPATIENT
Start: 2019-11-14 | End: 2019-11-14

## 2019-11-14 RX ORDER — ONDANSETRON 4 MG/1
4 TABLET, ORALLY DISINTEGRATING ORAL EVERY 30 MIN PRN
Status: DISCONTINUED | OUTPATIENT
Start: 2019-11-14 | End: 2019-11-15 | Stop reason: HOSPADM

## 2019-11-14 RX ORDER — PROPOFOL 10 MG/ML
INJECTION, EMULSION INTRAVENOUS CONTINUOUS PRN
Status: DISCONTINUED | OUTPATIENT
Start: 2019-11-14 | End: 2019-11-14

## 2019-11-14 RX ORDER — NALOXONE HYDROCHLORIDE 0.4 MG/ML
.1-.4 INJECTION, SOLUTION INTRAMUSCULAR; INTRAVENOUS; SUBCUTANEOUS
Status: DISCONTINUED | OUTPATIENT
Start: 2019-11-14 | End: 2019-11-15 | Stop reason: HOSPADM

## 2019-11-14 RX ORDER — ONDANSETRON 2 MG/ML
4 INJECTION INTRAMUSCULAR; INTRAVENOUS EVERY 30 MIN PRN
Status: DISCONTINUED | OUTPATIENT
Start: 2019-11-14 | End: 2019-11-15 | Stop reason: HOSPADM

## 2019-11-14 RX ORDER — FENTANYL CITRATE 50 UG/ML
25-50 INJECTION, SOLUTION INTRAMUSCULAR; INTRAVENOUS
Status: DISCONTINUED | OUTPATIENT
Start: 2019-11-14 | End: 2019-11-15 | Stop reason: HOSPADM

## 2019-11-14 RX ADMIN — GABAPENTIN 300 MG: 300 CAPSULE ORAL at 08:32

## 2019-11-14 RX ADMIN — SODIUM CHLORIDE, SODIUM LACTATE, POTASSIUM CHLORIDE, CALCIUM CHLORIDE: 600; 310; 30; 20 INJECTION, SOLUTION INTRAVENOUS at 09:17

## 2019-11-14 RX ADMIN — PROPOFOL 20 MG: 10 INJECTION, EMULSION INTRAVENOUS at 09:36

## 2019-11-14 RX ADMIN — PROPOFOL 150 MCG/KG/MIN: 10 INJECTION, EMULSION INTRAVENOUS at 09:30

## 2019-11-14 RX ADMIN — PROPOFOL 30 MG: 10 INJECTION, EMULSION INTRAVENOUS at 09:30

## 2019-11-14 ASSESSMENT — MIFFLIN-ST. JEOR: SCORE: 1533.69

## 2019-11-14 NOTE — ANESTHESIA CARE TRANSFER NOTE
Patient: Divina Delgadillo    Procedure(s):  Percutaneous Liver Biopsy    Diagnosis: Fatty liver [K76.0]  Diagnosis Additional Information: No value filed.    Anesthesia Type:   No value filed.     Note:  Airway :Room Air  Patient transferred to:Phase II  Comments: Uneventful transport   Report to HALLIE Quinonez  Exchanging well; color natl  Pt responds appropriately to command  IV patent  Lips/teeth/dentition as preop status  Questions answered  /69  HR 82  RR 16  Sat 98% room air  Handoff Report: Identifed the Patient, Identified the Reponsible Provider, Reviewed the pertinent medical history, Discussed the surgical course, Reviewed Intra-OP anesthesia mangement and issues during anesthesia, Set expectations for post-procedure period and Allowed opportunity for questions and acknowledgement of understanding      Vitals: (Last set prior to Anesthesia Care Transfer)    CRNA VITALS  11/14/2019 0924 - 11/14/2019 0958      11/14/2019             Resp Rate (observed):  (!) 1    Resp Rate (set):  10                Electronically Signed By: VERONIQUE MATUTE CRNA  November 14, 2019  9:58 AM

## 2019-11-14 NOTE — PROCEDURES
Divina Delgadillo  MRN: [id number]    Completed ultrasound guided random liver biopsy. A total of two passes yielded liver tissue cores collected for pathological evaluation.  No immediate complications. Dx: Fatty liver.  Thomas.  MAC

## 2019-11-14 NOTE — ANESTHESIA PREPROCEDURE EVALUATION
Anesthesia Pre-Procedure Evaluation    Patient: Divina Delgadillo   MRN:     [id number] Gender:   female   Age:    34 year old :      1985        Preoperative Diagnosis: Fatty liver [K76.0]   Procedure(s):  Percutaneous Liver Biopsy     Past Medical History:   Diagnosis Date     Cervical high risk HPV (human papillomavirus) test positive 2017     Depressive disorder, not elsewhere classified      DVT (deep venous thrombosis) (H)      Dysmetabolic syndrome X      Esophageal reflux     GERD     Mild intermittent asthma      Other abnormal heart sounds     Heart murmur     Other specified types of schizophrenia, unspecified condition      Pancreatic cancer (H)     left adrenal gland, partial pancreas, and spleen removed; no chemo or radiation.      Type II or unspecified type diabetes mellitus without mention of complication, not stated as uncontrolled      Unspecified disorder of tympanic membrane       Past Surgical History:   Procedure Laterality Date     ADRENALECTOMY       PANCREATECTOMY PARTIAL       SPLENECTOMY       SURGICAL HISTORY OF -   10/1/2000    Tympanoplasty     SURGICAL HISTORY OF -   10/1/2000    Tonsillectomy          Anesthesia Evaluation     .             ROS/MED HX    ENT/Pulmonary:     (+)asthma , . .    Neurologic:       Cardiovascular:     (+) hypertension----. : . . . :. .       METS/Exercise Tolerance:     Hematologic:         Musculoskeletal:         GI/Hepatic:     (+) GERD liver disease,       Renal/Genitourinary:     (+) chronic renal disease, type: CRI,       Endo:     (+) type II DM Obesity, .      Psychiatric:         Infectious Disease:         Malignancy:         Other:                         PHYSICAL EXAM:   Mental Status/Neuro: A/A/O   Airway: Facies: Feasible   Respiratory: Auscultation: CTAB      CV:    Comments:      Dental: Dentures                LABS:  CBC:   Lab Results   Component Value Date    WBC 18.0 (H) 2019    WBC 15.4 (H) 10/17/2019    HGB 13.6  "11/14/2019    HGB 12.7 10/17/2019    HCT 41.7 11/14/2019    HCT 40.3 10/17/2019     11/14/2019     10/17/2019     BMP:   Lab Results   Component Value Date     11/13/2019     10/17/2019    POTASSIUM 4.7 11/13/2019    POTASSIUM 4.7 10/17/2019    CHLORIDE 111 (H) 11/13/2019    CHLORIDE 106 10/17/2019    CO2 28 11/13/2019    CO2 27 10/17/2019    BUN 29 11/13/2019    BUN 22 10/17/2019    CR 1.56 (H) 11/13/2019    CR 1.29 (H) 10/17/2019    GLC 98 11/13/2019     (H) 10/17/2019     COAGS:   Lab Results   Component Value Date    PTT 24 08/09/2006    INR 0.93 11/13/2019     POC:   Lab Results   Component Value Date     (H) 11/14/2019    HCG Negative 04/21/2009    HCGS Negative 03/22/2019     OTHER:   Lab Results   Component Value Date    A1C 8.1 (H) 10/17/2019    CLAY 9.6 11/13/2019    PHOS 3.5 10/17/2019    MAG 1.9 11/29/2007    ALBUMIN 3.7 11/13/2019    PROTTOTAL 8.5 11/13/2019    ALT 41 11/13/2019    AST 26 11/13/2019     (H) 04/22/2009    ALKPHOS 178 (H) 11/13/2019    BILITOTAL 0.2 11/13/2019    LIPASE 376 03/22/2019    AMYLASE 35 01/05/2006    TSH 1.07 05/17/2017    CRP 5.4 08/01/2018    SED 24 (H) 08/01/2018        Preop Vitals    BP Readings from Last 3 Encounters:   11/14/19 (!) 143/73   11/13/19 136/58   10/24/19 110/78    Pulse Readings from Last 3 Encounters:   11/14/19 86   11/13/19 92   10/24/19 91      Resp Readings from Last 3 Encounters:   11/14/19 16   07/03/19 18   04/04/19 16    SpO2 Readings from Last 3 Encounters:   11/14/19 99%   11/13/19 98%   10/24/19 100%      Temp Readings from Last 1 Encounters:   11/14/19 37.4  C (99.3  F) (Oral)    Ht Readings from Last 1 Encounters:   11/14/19 1.626 m (5' 4\")      Wt Readings from Last 1 Encounters:   11/14/19 84.4 kg (186 lb)    Estimated body mass index is 31.93 kg/m  as calculated from the following:    Height as of this encounter: 1.626 m (5' 4\").    Weight as of this encounter: 84.4 kg (186 lb). "     LDA:  Peripheral IV 11/14/19 Right (Active)   Site Assessment WDL 11/14/2019  8:44 AM   Line Status Infusing 11/14/2019  8:44 AM   Phlebitis Scale 0-->no symptoms 11/14/2019  8:44 AM   Infiltration Scale 0 11/14/2019  8:44 AM   Extravasation? No 11/14/2019  8:44 AM   Number of days: 0        Assessment:   ASA SCORE: 3    H&P: History and physical reviewed and following examination; no interval change.   Smoking Status:  Non-Smoker/Unknown   NPO Status: NPO Appropriate     Plan:   Anes. Type:  MAC   Pre-Medication: None   Induction:  N/a   Airway: Native Airway   Access/Monitoring: PIV   Maintenance: Propofol Sedation     Postop Plan:   Postop Pain: None  Postop Sedation/Airway: Not planned  Disposition: Outpatient     PONV Management:   Adult Risk Factors: Female, Non-Smoker   Prevention: Ondansetron, Propofol     CONSENT: Direct conversation   Plan and risks discussed with: Patient   Blood Products: Consent Deferred (Minimal Blood Loss)                   Harvinder Corrigan MD

## 2019-11-14 NOTE — DISCHARGE INSTRUCTIONS
Discharge Instructions for Liver Biopsy  You had a procedure called liver biopsy. A healthcare provider used a special needle to remove a small piece of tissue from your liver.  A liver biopsy is ordered after other tests have shown that your liver is not working properly. You may also have a liver biopsy when liver disease is suspected.  Home care  Recommendations include the following:     If you had anesthesia, you should not drive until the day after your biopsy.     Remove the bandage covering the biopsy site 48 hours after the procedure.    Bedrest for 4 hours immediately after the procedure.    Don t shower for 48 hours after the biopsy. If you wish, you may wash yourself with a sponge or washcloth. When you are able to shower, don t scrub the site. Gently wash the area and pat it dry.    Don t lift anything heavier than 10 pounds for 3 days after the procedure, or as advised by your healthcare provider.    Don't do strenuous activities or exercises after the procedure.    Ask your healthcare provider when you can return to work.    Do not start taking blood thinners without clear instructions from your healthcare provider.  Follow-up care  Make a follow-up appointment as directed by our staff.     When to call your healthcare provider  Call your healthcare provider immediately if you have any of the following:    Bleeding from the biopsy site    Dizziness or lightheadedness    Sudden or increased shortness of breath    Sudden chest pain    Fever of 100.4 F (38.0 C) or higher, or as directed by your healthcare provider    Shaking chills    Increasing redness, tenderness, or swelling at the biopsy site    Drainage from the biopsy site    Opening of the biopsy site    Increasing pain, with or without activity, in the liver or belly area, or pain shooting to the right shoulder     Additional Instructions:    Please call our IR service for the following problems:       - If the skin around the biopsy sight is  "swollen, reddened, painful, or has any discharge.  - If you have persistent pain in biopsy sight.  - If you have a fever of greater than 100.5  F and chills.  - If you feel nauseated and \"just not right.\"      [facility]  Interventional Radiology (IR)  [address]  [address] [facility] [place] [address]    Contact Number:  [phone]  (IR control desk)  - Monday - Friday 8:00 am - 4:30 pm    After hours for urgent concerns:  [phone]  - After 4:30 pm Monday - Friday, Weekends and Holidays.   - Ask for Interventional Radiology on-call.  Someone is available 24 hours a day.  - [facility] toll free number:  [phone]               "

## 2019-11-14 NOTE — ANESTHESIA POSTPROCEDURE EVALUATION
Anesthesia POST Procedure Evaluation    Patient: Divina Delgadillo   MRN:     [id number] Gender:   female   Age:    34 year old :      1985        Preoperative Diagnosis: Fatty liver [K76.0]   Procedure(s):  Percutaneous Liver Biopsy   Postop Comments: No value filed.       Anesthesia Type:  Not documented  No value filed.    Reportable Event: NO     PAIN: Uncomplicated   Sign Out status: Comfortable, Well controlled pain     PONV: No PONV   Sign Out status:  No Nausea or Vomiting     Neuro/Psych: Uneventful perioperative course   Sign Out Status: Preoperative baseline; Age appropriate mentation     Airway/Resp.: Uneventful perioperative course   Sign Out Status: Non labored breathing, age appropriate RR; Resp. Status within EXPECTED Parameters     CV: Uneventful perioperative course   Sign Out status: Appropriate BP and perfusion indices; Appropriate HR/Rhythm     Disposition:   Sign Out in:  Phase II  Disposition:  Home  Recovery Course: Uneventful  Follow-Up: Not required           Last Anesthesia Record Vitals:  CRNA VITALS  2019 0924 - 2019 1024      2019             Resp Rate (observed):  (!) 1    Resp Rate (set):  10          Last PACU Vitals:  Vitals Value Taken Time   BP     Temp     Pulse     Resp     SpO2     Temp src Available 2019  9:45 AM   NIBP 90/50 2019  9:51 AM   Pulse 77 2019  9:53 AM   SpO2 98 % 2019  9:53 AM   Resp     Temp     Ht Rate 94 2019  9:53 AM   Temp 2           Electronically Signed By: Harvinder Corrigan MD, 2019, 2:32 PM

## 2019-11-15 ENCOUNTER — PATIENT OUTREACH (OUTPATIENT)
Dept: NURSING | Facility: CLINIC | Age: 34
End: 2019-11-15

## 2019-11-19 LAB — COPATH REPORT: NORMAL

## 2019-11-21 DIAGNOSIS — F25.0 SCHIZOAFFECTIVE DISORDER, BIPOLAR TYPE (H): ICD-10-CM

## 2019-11-21 DIAGNOSIS — N18.30 CKD (CHRONIC KIDNEY DISEASE) STAGE 3, GFR 30-59 ML/MIN (H): ICD-10-CM

## 2019-11-21 LAB
BASOPHILS # BLD AUTO: 0.1 10E9/L (ref 0–0.2)
BASOPHILS NFR BLD AUTO: 0.6 %
CREAT UR-MCNC: 155 MG/DL
DIFFERENTIAL METHOD BLD: ABNORMAL
EOSINOPHIL # BLD AUTO: 0.2 10E9/L (ref 0–0.7)
EOSINOPHIL NFR BLD AUTO: 1.1 %
ERYTHROCYTE [DISTWIDTH] IN BLOOD BY AUTOMATED COUNT: 13.5 % (ref 10–15)
HCT VFR BLD AUTO: 40.1 % (ref 35–47)
HGB BLD-MCNC: 12.7 G/DL (ref 11.7–15.7)
LYMPHOCYTES # BLD AUTO: 5 10E9/L (ref 0.8–5.3)
LYMPHOCYTES NFR BLD AUTO: 27.6 %
MCH RBC QN AUTO: 30.8 PG (ref 26.5–33)
MCHC RBC AUTO-ENTMCNC: 31.7 G/DL (ref 31.5–36.5)
MCV RBC AUTO: 97 FL (ref 78–100)
MONOCYTES # BLD AUTO: 1.3 10E9/L (ref 0–1.3)
MONOCYTES NFR BLD AUTO: 7.4 %
NEUTROPHILS # BLD AUTO: 11.4 10E9/L (ref 1.6–8.3)
NEUTROPHILS NFR BLD AUTO: 63.3 %
PLATELET # BLD AUTO: 322 10E9/L (ref 150–450)
PROT UR-MCNC: 0.33 G/L
PROT/CREAT 24H UR: 0.21 G/G CR (ref 0–0.2)
RBC # BLD AUTO: 4.12 10E12/L (ref 3.8–5.2)
WBC # BLD AUTO: 18 10E9/L (ref 4–11)

## 2019-11-21 PROCEDURE — 85025 COMPLETE CBC W/AUTO DIFF WBC: CPT | Performed by: CLINICAL NURSE SPECIALIST

## 2019-11-21 PROCEDURE — 84156 ASSAY OF PROTEIN URINE: CPT | Performed by: INTERNAL MEDICINE

## 2019-11-21 PROCEDURE — 36415 COLL VENOUS BLD VENIPUNCTURE: CPT | Performed by: CLINICAL NURSE SPECIALIST

## 2019-11-21 NOTE — LETTER
November 27, 2019      Divina Delgadillo  [address] [address] [facility] [address]              Dear Divina,    Your most recent lab results are attached.  Labs from mid November showed your kidney function stable. Your urine protein level is not normal but is stable. Please call or MyChart with questions or concerns.  [phone].      Sincerely,      Sánchez Dias DO    Division of Renal Diseases and Hypertension  [facility]  KW/gw    Component      Latest Ref Rng & Units 11/21/2019   Protein Random Urine      g/L 0.33   Protein Total Urine g/gr Creatinine      0 - 0.2 g/g Cr 0.21 (H)   Creatinine Urine      mg/dL 155

## 2019-11-30 DIAGNOSIS — I10 ESSENTIAL HYPERTENSION: ICD-10-CM

## 2019-12-02 NOTE — TELEPHONE ENCOUNTER
"Requested Prescriptions   Pending Prescriptions Disp Refills     amLODIPine (NORVASC) 5 MG tablet [Pharmacy Med Name: AMLODIPINE BESYLATE 5 MG TABLET] 180 tablet 1     Sig: Take 2 tablets (10 mg) by mouth daily   Last Written Prescription Date:  6/13/19  Last Fill Quantity: 180 tab,  # refills: 1   Last office visit: 11/13/2019 with prescribing provider:  Jaleesa Velasquez     Future Office Visit:   Next 5 appointments (look out 90 days)    Dec 04, 2019  3:00 PM CST  PHYSICAL with VERONIQUE Bledsoe CNP  [facility]  Arrive at: [facility] [address] [facility] [address]  [phone]             Calcium Channel Blockers Protocol  Failed - 11/30/2019  8:00 AM        Failed - Normal serum creatinine on file in past 12 months     Recent Labs   Lab Test 11/13/19  1547  09/01/17  1049   CR 1.56*   < >  --    CREAT  --   --  1.6*    < > = values in this interval not displayed.             Passed - Blood pressure under 140/90 in past 12 months     BP Readings from Last 3 Encounters:   11/14/19 113/55   11/13/19 136/58   10/24/19 110/78                 Passed - Recent (12 mo) or future (30 days) visit within the authorizing provider's specialty     Patient has had an office visit with the authorizing provider or a provider within the authorizing providers department within the previous 12 mos or has a future within next 30 days. See \"Patient Info\" tab in inbasket, or \"Choose Columns\" in Meds & Orders section of the refill encounter.              Passed - Medication is active on med list        Passed - Patient is age 18 or older        Passed - No active pregnancy on record        Passed - No positive pregnancy test in past 12 months          "

## 2019-12-04 ENCOUNTER — OFFICE VISIT (OUTPATIENT)
Dept: FAMILY MEDICINE | Facility: CLINIC | Age: 34
End: 2019-12-04
Payer: MEDICARE

## 2019-12-04 VITALS
BODY MASS INDEX: 31.91 KG/M2 | OXYGEN SATURATION: 98 % | WEIGHT: 186.9 LBS | TEMPERATURE: 99.2 F | SYSTOLIC BLOOD PRESSURE: 110 MMHG | DIASTOLIC BLOOD PRESSURE: 54 MMHG | HEIGHT: 64 IN | HEART RATE: 97 BPM

## 2019-12-04 DIAGNOSIS — Z00.00 ANNUAL PHYSICAL EXAM: Primary | ICD-10-CM

## 2019-12-04 PROCEDURE — 99395 PREV VISIT EST AGE 18-39: CPT | Performed by: NURSE PRACTITIONER

## 2019-12-04 RX ORDER — AMLODIPINE BESYLATE 5 MG/1
10 TABLET ORAL DAILY
Qty: 180 TABLET | Refills: 1 | Status: SHIPPED | OUTPATIENT
Start: 2019-12-04 | End: 2019-12-31

## 2019-12-04 ASSESSMENT — MIFFLIN-ST. JEOR: SCORE: 1537.77

## 2019-12-04 ASSESSMENT — ACTIVITIES OF DAILY LIVING (ADL): CURRENT_FUNCTION: NO ASSISTANCE NEEDED

## 2019-12-04 NOTE — PATIENT INSTRUCTIONS
Quit smoking  Use sunscreen when outside  Schedule lab appointment to recheck your cholesterol level (fasting)

## 2019-12-04 NOTE — PROGRESS NOTES
"   SUBJECTIVE:   CC: Divina Delgadillo is an 34 year old woman who presents for preventive health visit.     Healthy Habits:     In general, how would you rate your overall health?  Fair    Frequency of exercise:  None    Duration of exercise:  Less than 15 minutes    Do you usually eat at least 4 servings of fruit and vegetables a day, include whole grains    & fiber and avoid regularly eating high fat or \"junk\" foods?  No    Taking medications regularly:  Yes    Barriers to taking medications:  None    Medication side effects:  None    Ability to successfully perform activities of daily living:  No assistance needed    Home Safety:  No safety concerns identified    Hearing Impairment:  No hearing concerns    In the past 6 months, have you been bothered by leaking of urine?  No    In general, how would you rate your overall mental or emotional health?  Very good      PHQ-2 Total Score: 0    Additional concerns today:  No    Today's PHQ-2 Score:   PHQ-2 (  [employer]) 2019   Q1: Little interest or pleasure in doing things 0   Q2: Feeling down, depressed or hopeless 0   PHQ-2 Score 0       Abuse: Current or Past(Physical, Sexual or Emotional)- No  Do you feel safe in your environment? Yes    Social History     Tobacco Use     Smoking status: Current Every Day Smoker     Packs/day: 1.00     Years: 4.00     Pack years: 4.00     Types: Cigarettes     Last attempt to quit: 2019     Years since quittin.4     Smokeless tobacco: Never Used     Tobacco comment: 15 cigarettes a day    Substance Use Topics     Alcohol use: No     If you drink alcohol do you typically have >3 drinks per day or >7 drinks per week? Not applicable    Alcohol Use 6/15/2017   Prescreen: >3 drinks/day or >7 drinks/week? patient doesn't drink     Reviewed orders with patient.  Reviewed health maintenance and updated orders accordingly - Yes  Lab work is in process  Labs reviewed in EPIC  BP Readings from Last 3 Encounters:   19 " 110/54   11/14/19 113/55   11/13/19 136/58    Wt Readings from Last 3 Encounters:   12/04/19 84.8 kg (186 lb 14.4 oz)   11/14/19 84.4 kg (186 lb)   11/13/19 84.4 kg (186 lb)                    Mammogram not appropriate for this patient based on age.    Pertinent mammograms are reviewed under the imaging tab.  History of abnormal Pap smear: NO - age 30- 65 PAP every 3 years recommended  PAP / HPV Latest Ref Rng & Units 6/14/2018 6/15/2017 3/4/2009   PAP - NIL NIL NIL   HPV 16 DNA NEG:Negative Negative Negative -   HPV 18 DNA NEG:Negative Negative Negative -   OTHER HR HPV NEG:Negative Negative Positive(A) -     Reviewed and updated as needed this visit by clinical staff  Tobacco  Allergies  Meds  Med Hx  Surg Hx  Fam Hx  Soc Hx        Reviewed and updated as needed this visit by Provider            Review of Systems  CONSTITUTIONAL: NEGATIVE for fever, chills, change in weight  INTEGUMENTARU/SKIN: NEGATIVE for worrisome rashes, moles or lesions  EYES: NEGATIVE for vision changes or irritation  ENT: NEGATIVE for ear, mouth and throat problems  RESP: NEGATIVE for significant cough or SOB  BREAST: NEGATIVE for masses, tenderness or discharge  CV: NEGATIVE for chest pain, palpitations or peripheral edema  GI: NEGATIVE for nausea, abdominal pain, heartburn, or change in bowel habits  : NEGATIVE for unusual urinary or vaginal symptoms. Periods are regular.  MUSCULOSKELETAL: NEGATIVE for significant arthralgias or myalgia  NEURO: NEGATIVE for weakness, dizziness or paresthesias  PSYCHIATRIC: NEGATIVE for changes in mood or affect     OBJECTIVE:   There were no vitals taken for this visit.  Physical Exam  GENERAL: healthy, alert and no distress  EYES: Eyes grossly normal to inspection, PERRL and conjunctivae and sclerae normal  HENT: normal cephalic/atraumatic, both ears: clear effusion, nose and mouth without ulcers or lesions, oropharynx clear and oral mucous membranes moist  NECK: no adenopathy, no asymmetry,  "masses, or scars and thyroid normal to palpation  RESP: lungs clear to auscultation - no rales, rhonchi or wheezes  CV: regular rates and rhythm, normal S1 S2, no S3 or S4, grade 3/6 sytolic murmur heard best over the apex, peripheral pulses strong and no peripheral edema  ABDOMEN: soft, nontender, no hepatosplenomegaly, no masses and bowel sounds normal  MS: no gross musculoskeletal defects noted, no edema  SKIN: no suspicious lesions or rashes  NEURO: Normal strength and tone, mentation intact and speech normal  PSYCH: mentation appears normal, affect normal/bright    Diagnostic Test Results:  Labs reviewed in Epic    ASSESSMENT/PLAN:   1. Annual physical exam  -discussed healthy diet, recommended regular exercise  -quit smoking   - Lipid panel reflex to direct LDL Fasting; Future    COUNSELING:  Reviewed preventive health counseling, as reflected in patient instructions       Regular exercise       Healthy diet/nutrition    Estimated body mass index is 31.93 kg/m  as calculated from the following:    Height as of 11/14/19: 1.626 m (5' 4\").    Weight as of 11/14/19: 84.4 kg (186 lb).    Weight management plan: Discussed healthy diet and exercise guidelines     reports that she has been smoking cigarettes. She has a 4.00 pack-year smoking history. She has never used smokeless tobacco.  Tobacco Cessation Action Plan: Information offered: Patient not interested at this time    Counseling Resources:  ATP IV Guidelines  Pooled Cohorts Equation Calculator  Breast Cancer Risk Calculator  FRAX Risk Assessment  ICSI Preventive Guidelines  Dietary Guidelines for Americans, 2010  USDA's MyPlate  ASA Prophylaxis  Lung CA Screening    VERONIQUE Spears CNP  [facility]  "

## 2019-12-05 NOTE — LETTER
4/15/2019         RE: Divina Delgadillo  [id number] [address] [place] [id number]        Dear Colleague,    Thank you for referring your patient, Divina Delgadillo, to the [facility]. Please see a copy of my visit note below.    History of Present Illness - Divina Delgadillo is a 34 year old female I have been following for right otorrhea and a long history of Eustachian tube dysfunction. She had PE tubes in childhood, but denies other surgery on the ears.   In Fall 2018, she presented with right otorrhea. I treated with steroid drops and then obtained a CT Temporal bone. The drops helped reduce the otorrhea, and on CT she had some potential soft tissue in Prussak's space on the right, but no clear sign of cholesteatoma.  At that time she was advised to engage strict dry ear precautions, but she has not yet purchased ear plugs. She presents today for f/u, but denies much trouble with ear pain or drainage from the ears.    Past Medical History -   Patient Active Problem List   Diagnosis     Esophageal reflux     NAFL (nonalcoholic fatty liver)     Essential hypertension     Hyperlipidemia LDL goal <100     DVT (deep venous thrombosis) (H)     CKD (chronic kidney disease) stage 3, GFR 30-59 ml/min (H)     Malignant neoplasm of pancreas, unspecified location of malignancy (H)     Type 2 diabetes mellitus with stage 3 chronic kidney disease, with long-term current use of insulin (H)     Bipolar disorder (H)     Cervical high risk HPV (human papillomavirus) test positive     IUD (intrauterine device) in place     Morbid obesity (H)     Mild intermittent asthma with acute exacerbation     Nicotine abuse     H/O leukocytosis     Acute-on-chronic kidney injury (H)     Near syncope     Leukocytosis     Acquired asplenia     Acute pain of left knee     ARF (acute renal failure) (H)       Current Medications -   Current Outpatient Medications:      amLODIPine (NORVASC) 5 MG tablet, Take 2 tablets (10 mg) by  PATIENT WAS INFORMED DR DRISCOLL GOES  AND TO SCHEDULED WITH HIM THERE   mouth daily, Disp: 90 tablet, Rfl: 1     ARIPiprazole (ABILIFY) 30 MG tablet, Take 30 mg by mouth daily, Disp: , Rfl:      blood glucose monitoring (NO BRAND SPECIFIED) test strip, 1 strip by In Vitro route 3 times daily, Disp: 100 each, Rfl: 11     blood glucose monitoring (SOFTCLIX) lancets, Use to test blood sugar 3 times daily, Disp: 100 each, Rfl: 11     Blood Glucose Monitoring Suppl (ACCU-CHEK GUIDE) w/Device KIT, 1 Device daily, Disp: 1 kit, Rfl: 0     carvedilol (COREG) 6.25 MG tablet, Increase Coreg to 12.5 mg AM and continue 6.25 mg PM, Disp: 75 tablet, Rfl: 1     ciprofloxacin-dexamethasone (CIPRODEX) 0.3-0.1 % otic suspension, Place 4 drops into both ears 2 times daily for 10 days, Disp: 7.5 mL, Rfl: 0     Clozapine (CLOZARIL) 200 MG tablet, Take 200 mg by mouth 2 times daily, Disp: , Rfl:      FLUoxetine (PROZAC) 20 MG capsule, Take 20 mg by mouth daily, Disp: , Rfl:      insulin glargine (LANTUS SOLOSTAR PEN) 100 UNIT/ML pen, Inject 25 units once daily   (allow max dose 35 units/day for titration), Disp: 15 mL, Rfl: 2     lamoTRIgine (LAMICTAL) 25 MG tablet, 100 mg at bedtime, Disp: , Rfl: 0     lisinopril (PRINIVIL/ZESTRIL) 10 MG tablet, Take 0.5 tablets (5 mg) by mouth daily, Disp: 90 tablet, Rfl: 3     loratadine (CLARITIN) 10 MG tablet, Take 1 tablet (10 mg) by mouth every morning, Disp: 90 tablet, Rfl: 1     montelukast (SINGULAIR) 10 MG tablet, Take 1 tablet (10 mg) by mouth At Bedtime, Disp: 90 tablet, Rfl: 1     omeprazole (PRILOSEC) 20 MG CR capsule, Take 1 capsule (20 mg) by mouth daily, Disp: 60 capsule, Rfl: 5     ranitidine (ZANTAC) 300 MG tablet, Take 1 tablet (300 mg) by mouth At Bedtime, Disp: 90 tablet, Rfl: 1     semaglutide (OZEMPIC) 1 MG/DOSE pen, Inject 1 mg Subcutaneous every 7 days (Patient taking differently: Inject 1 mg Subcutaneous every 7 days On thursdays), Disp: 3 mL, Rfl: 3     ULTICARE MINI 31G X 6 MM insulin pen needle, Use 1 pen needles daily, Disp: 100 each, Rfl:  11     ULTILET SHARPS CONTAINER 1QT MISC, 1 Device every 30 days, Disp: 1 each, Rfl: 11     albuterol (PROAIR HFA/PROVENTIL HFA/VENTOLIN HFA) 108 (90 BASE) MCG/ACT Inhaler, Inhale 2 puffs into the lungs every 6 hours as needed for shortness of breath / dyspnea or wheezing (Patient not taking: Reported on 4/15/2019), Disp: 1 Inhaler, Rfl: 3    Allergies -   Allergies   Allergen Reactions     Acetaminophen Other (See Comments)     pt previously tried to overdose on it, PMD does not want pt taking per pt.      Latex Rash       Social History -   Social History     Social History     Marital status: Single     Spouse name: N/A     Number of children: 0     Years of education: N/A     Social History Main Topics     Smoking status: Current Every Day Smoker     Packs/day: 0.50     Years: 4.00     Types: Cigarettes     Smokeless tobacco: Never Used      Comment: has information about quitplan     Alcohol use No     Drug use: No     Sexual activity: Yes     Partners: Female      Comment: Both male and female      Other Topics Concern     Parent/Sibling W/ Cabg, Mi Or Angioplasty Before 65f 55m? No     Social History Narrative       Family History -   Family History   Problem Relation Age of Onset     Respiratory Mother         ASTHMA     Allergies Mother      Depression Mother      Heart Disease Father         HEART VALVE REPLACEMENT     Hypertension Father      Diabetes Father      Depression Father      Respiratory Brother      Allergies Brother      Respiratory Sister      Allergies Sister      Depression Sister      Respiratory Sister      Allergies Sister      Depression Sister      Kidney Disease No family hx of        Review of Systems - As per HPI and PMHx, otherwise 7 system review of the head and neck negative. 10+ system review negative.    Exam:  /71 (BP Location: Right arm, Patient Position: Chair, Cuff Size: Adult Regular)   Pulse 72   Temp 98.4  F (36.9  C) (Oral)   Wt 86.2 kg (190 lb)   BMI 32.61  kg/m     General - The patient is well nourished and well developed, and appears to have good nutritional status.  Alert and oriented to person and place, answers questions and cooperates with examination appropriately.   Head and Face - Normocephalic and atraumatic, with no gross asymmetry noted of the contour of the facial features.  The facial nerve is intact, with strong symmetric movements.  Eyes - Extraocular movements intact.  Sclera were not icteric or injected, conjunctiva were pink and moist.  Ears - right TM with inflammed polypoid change on the lateral surface of the inferior half of the TM. No visible perforation, but the TM is retracted. There is overlying moist green otorrhea on the inflammed portion of tympanic membrane.  On the left, the tympanic membrane has a deep retraction of the posterior half of the TM. There is adherent thick dried otorrhea posteriorly which was removed. No active otorrhea. She is unable to autoinsufflate either ear.    CT Temporal bone 11/26/18 directly visualized. Evidence of bilateral chronic Eustachian tube dysfunction. Right ear with a high possibly dehiscent jugular bulb nearing the inferior insertion of the tympanic membrane. Left ear with a very low tegmen abuting the bony ear canal.    IMPRESSION:   1. Small amount of soft tissue in the region of the right Prussak's  space. Retracted right tympanic membrane. Questionable erosion of the  scutum. These findings are suspicious for a cholesteatoma. Multiple  right mastoid air cells are opacified.  2. Severely retracted left tympanic membrane. No definite middle ear  mass is identified on these images. The left mastoid is poorly  aerated.    A/P - Divina HOANG Delgadillo is a 34 year old female with chronic Eustachian tube dysfunction and resultant anatomic abnormalities of the middle ear. She has recurrence of chronic otorrhea, especially on the right, since her last exam 5 months ago. This may be due to water exposure as she  has not been vigilant with dry ear precautions.    I recommended a course of Ciprodex to try to again calm down the inflamed surface of the right TM. I also placed a referral to the otology team at the [facility] to consider if tympanoplasty may be necessary to eradicate the chronically inflamed appearance of the right tympanic membrane.      Dr. Meena Pelaez MD  Otolaryngology  [facility]      Again, thank you for allowing me to participate in the care of your patient.        Sincerely,        Meena Pelaez MD

## 2019-12-12 ENCOUNTER — DOCUMENTATION ONLY (OUTPATIENT)
Dept: OTHER | Facility: CLINIC | Age: 34
End: 2019-12-12

## 2019-12-24 DIAGNOSIS — F25.0 SCHIZOAFFECTIVE DISORDER, BIPOLAR TYPE (H): ICD-10-CM

## 2019-12-24 LAB
BASOPHILS # BLD AUTO: 0.1 10E9/L (ref 0–0.2)
BASOPHILS NFR BLD AUTO: 0.5 %
DIFFERENTIAL METHOD BLD: ABNORMAL
EOSINOPHIL # BLD AUTO: 0.4 10E9/L (ref 0–0.7)
EOSINOPHIL NFR BLD AUTO: 2.5 %
ERYTHROCYTE [DISTWIDTH] IN BLOOD BY AUTOMATED COUNT: 14.4 % (ref 10–15)
HCT VFR BLD AUTO: 40.5 % (ref 35–47)
HGB BLD-MCNC: 12.5 G/DL (ref 11.7–15.7)
LYMPHOCYTES # BLD AUTO: 4.9 10E9/L (ref 0.8–5.3)
LYMPHOCYTES NFR BLD AUTO: 29.1 %
MCH RBC QN AUTO: 30.7 PG (ref 26.5–33)
MCHC RBC AUTO-ENTMCNC: 30.9 G/DL (ref 31.5–36.5)
MCV RBC AUTO: 100 FL (ref 78–100)
MONOCYTES # BLD AUTO: 1.5 10E9/L (ref 0–1.3)
MONOCYTES NFR BLD AUTO: 9.1 %
NEUTROPHILS # BLD AUTO: 9.8 10E9/L (ref 1.6–8.3)
NEUTROPHILS NFR BLD AUTO: 58.8 %
PLATELET # BLD AUTO: 316 10E9/L (ref 150–450)
RBC # BLD AUTO: 4.07 10E12/L (ref 3.8–5.2)
WBC # BLD AUTO: 16.7 10E9/L (ref 4–11)

## 2019-12-24 PROCEDURE — 36415 COLL VENOUS BLD VENIPUNCTURE: CPT | Performed by: CLINICAL NURSE SPECIALIST

## 2019-12-24 PROCEDURE — 85025 COMPLETE CBC W/AUTO DIFF WBC: CPT | Performed by: CLINICAL NURSE SPECIALIST

## 2019-12-28 DIAGNOSIS — E78.5 HYPERLIPIDEMIA LDL GOAL <100: ICD-10-CM

## 2019-12-30 ENCOUNTER — TELEPHONE (OUTPATIENT)
Dept: FAMILY MEDICINE | Facility: CLINIC | Age: 34
End: 2019-12-30

## 2019-12-30 DIAGNOSIS — K21.9 GASTROESOPHAGEAL REFLUX DISEASE WITHOUT ESOPHAGITIS: ICD-10-CM

## 2019-12-30 DIAGNOSIS — I10 ESSENTIAL HYPERTENSION: ICD-10-CM

## 2019-12-30 DIAGNOSIS — J45.21 MILD INTERMITTENT ASTHMA WITH ACUTE EXACERBATION: ICD-10-CM

## 2019-12-30 RX ORDER — ATORVASTATIN CALCIUM 10 MG/1
10 TABLET, FILM COATED ORAL DAILY
Qty: 30 TABLET | Refills: 5 | Status: SHIPPED | OUTPATIENT
Start: 2019-12-30 | End: 2020-04-28

## 2019-12-30 NOTE — TELEPHONE ENCOUNTER
Routing refill request to provider for review/approval because:  Labs out of range:  Lipids    Thank you    Oliva RODRIGUEZ RN

## 2019-12-30 NOTE — TELEPHONE ENCOUNTER
"Requested Prescriptions   Pending Prescriptions Disp Refills     atorvastatin (LIPITOR) 10 MG tablet [Pharmacy Med Name: ATORVASTATIN CALCIUM 10 MG TABLET] 30 tablet 5     Sig: Take 1 tablet (10 mg) by mouth daily   Last Written Prescription Date:  7/13/19  Last Fill Quantity: 30 tab,  # refills: 5   Last office visit: 12/4/2019 with prescribing provider:  Jaleesa Velasquez     Future Office Visit:        Statins Protocol Passed - 12/28/2019  8:00 AM        Passed - LDL on file in past 12 months     Recent Labs   Lab Test 07/12/19  0828   *             Passed - No abnormal creatine kinase in past 12 months     Recent Labs   Lab Test 03/22/19  1735                   Passed - Recent (12 mo) or future (30 days) visit within the authorizing provider's specialty     Patient has had an office visit with the authorizing provider or a provider within the authorizing providers department within the previous 12 mos or has a future within next 30 days. See \"Patient Info\" tab in inbasket, or \"Choose Columns\" in Meds & Orders section of the refill encounter.              Passed - Medication is active on med list        Passed - Patient is age 18 or older        Passed - No active pregnancy on record        Passed - No positive pregnancy test in past 12 months          "

## 2019-12-30 NOTE — TELEPHONE ENCOUNTER
Refilled, she is due for fasting lipid panel, this was previously ordered.     VERONIQUE Spears CNP

## 2019-12-31 RX ORDER — MONTELUKAST SODIUM 10 MG/1
10 TABLET ORAL AT BEDTIME
Qty: 90 TABLET | Refills: 1 | Status: SHIPPED | OUTPATIENT
Start: 2019-12-31 | End: 2020-02-27

## 2019-12-31 RX ORDER — CARVEDILOL 6.25 MG/1
TABLET ORAL
Qty: 270 TABLET | Refills: 3 | Status: SHIPPED | OUTPATIENT
Start: 2019-12-31 | End: 2020-02-07

## 2019-12-31 NOTE — TELEPHONE ENCOUNTER
Routing refill request to provider for review/approval because:  Labs out of range:    Creatinine   Date Value Ref Range Status   11/13/2019 1.56 (H) 0.52 - 1.04 mg/dL Final     Need alternative for ranitidine.      Marianela JIMENEZ BSN, RN

## 2019-12-31 NOTE — TELEPHONE ENCOUNTER
Spoke to Lorin at [facility] home about refill and the need to have fasting lipid profile done. Debora LOPEZ RN

## 2020-01-02 DIAGNOSIS — Z00.00 ANNUAL PHYSICAL EXAM: ICD-10-CM

## 2020-01-02 LAB
CHOLEST SERPL-MCNC: 170 MG/DL
HDLC SERPL-MCNC: 55 MG/DL
LDLC SERPL CALC-MCNC: 78 MG/DL
NONHDLC SERPL-MCNC: 115 MG/DL
TRIGL SERPL-MCNC: 183 MG/DL

## 2020-01-02 PROCEDURE — 80061 LIPID PANEL: CPT | Performed by: NURSE PRACTITIONER

## 2020-01-02 PROCEDURE — 36415 COLL VENOUS BLD VENIPUNCTURE: CPT | Performed by: NURSE PRACTITIONER

## 2020-01-02 RX ORDER — AMLODIPINE BESYLATE 5 MG/1
10 TABLET ORAL DAILY
Qty: 180 TABLET | Refills: 3 | Status: ON HOLD | OUTPATIENT
Start: 2020-01-02 | End: 2020-05-26

## 2020-01-02 RX ORDER — FAMOTIDINE 40 MG/1
40 TABLET, FILM COATED ORAL
Qty: 90 TABLET | Refills: 3 | Status: ON HOLD | OUTPATIENT
Start: 2020-01-02 | End: 2020-05-26

## 2020-01-02 RX ORDER — LISINOPRIL 10 MG/1
5 TABLET ORAL DAILY
Qty: 90 TABLET | Refills: 3 | Status: SHIPPED | OUTPATIENT
Start: 2020-01-02 | End: 2020-02-27

## 2020-01-08 ENCOUNTER — TELEPHONE (OUTPATIENT)
Dept: FAMILY MEDICINE | Facility: CLINIC | Age: 35
End: 2020-01-08

## 2020-01-09 NOTE — TELEPHONE ENCOUNTER
[facility] - diabetic shoe order form completed and routed to Dr. Rodriguez as form requires DO/MD signature

## 2020-01-13 ENCOUNTER — TELEPHONE (OUTPATIENT)
Dept: FAMILY MEDICINE | Facility: CLINIC | Age: 35
End: 2020-01-13

## 2020-01-13 DIAGNOSIS — N18.30 TYPE 2 DIABETES MELLITUS WITH STAGE 3 CHRONIC KIDNEY DISEASE, WITH LONG-TERM CURRENT USE OF INSULIN (H): Primary | ICD-10-CM

## 2020-01-13 DIAGNOSIS — Z79.4 TYPE 2 DIABETES MELLITUS WITH STAGE 3 CHRONIC KIDNEY DISEASE, WITH LONG-TERM CURRENT USE OF INSULIN (H): Primary | ICD-10-CM

## 2020-01-13 DIAGNOSIS — E11.22 TYPE 2 DIABETES MELLITUS WITH STAGE 3 CHRONIC KIDNEY DISEASE, WITH LONG-TERM CURRENT USE OF INSULIN (H): Primary | ICD-10-CM

## 2020-01-13 DIAGNOSIS — N18.30 CKD (CHRONIC KIDNEY DISEASE) STAGE 3, GFR 30-59 ML/MIN (H): ICD-10-CM

## 2020-01-13 NOTE — TELEPHONE ENCOUNTER
Reason for Call: Request for an order or referral:    Order or referral being requested: Lorin is calling asking for labs to be placed she is not sure what needs to be placed she knows a A1c     Date needed: at your convenience    Has the patient been seen by the PCP for this problem? YES    Phone number Patient can be reached at:  Home number on file [phone] (home)    Best Time:  any    Can we leave a detailed message on this number?  YES    Call taken on 1/13/2020 at 12:58 PM by Luanne Lagos

## 2020-01-13 NOTE — TELEPHONE ENCOUNTER
Patient's foster mother Lorin calling for lab orders. States she thinks she needs to recheck A1C.     Pended A1C. Also due for TSH and microalbumin per health maintenance.      Marianela JIMENEZ BSN, RN

## 2020-01-14 DIAGNOSIS — N18.30 CKD (CHRONIC KIDNEY DISEASE) STAGE 3, GFR 30-59 ML/MIN (H): ICD-10-CM

## 2020-01-14 DIAGNOSIS — N18.30 TYPE 2 DIABETES MELLITUS WITH STAGE 3 CHRONIC KIDNEY DISEASE, WITH LONG-TERM CURRENT USE OF INSULIN (H): ICD-10-CM

## 2020-01-14 DIAGNOSIS — F25.0 SCHIZOAFFECTIVE DISORDER, BIPOLAR TYPE (H): ICD-10-CM

## 2020-01-14 DIAGNOSIS — E11.22 TYPE 2 DIABETES MELLITUS WITH STAGE 3 CHRONIC KIDNEY DISEASE, WITH LONG-TERM CURRENT USE OF INSULIN (H): ICD-10-CM

## 2020-01-14 DIAGNOSIS — Z79.4 TYPE 2 DIABETES MELLITUS WITH STAGE 3 CHRONIC KIDNEY DISEASE, WITH LONG-TERM CURRENT USE OF INSULIN (H): ICD-10-CM

## 2020-01-14 LAB
BASOPHILS # BLD AUTO: 0.2 10E9/L (ref 0–0.2)
BASOPHILS NFR BLD AUTO: 1.1 %
CREAT UR-MCNC: 213 MG/DL
DIFFERENTIAL METHOD BLD: ABNORMAL
EOSINOPHIL # BLD AUTO: 0.2 10E9/L (ref 0–0.7)
EOSINOPHIL NFR BLD AUTO: 1.1 %
ERYTHROCYTE [DISTWIDTH] IN BLOOD BY AUTOMATED COUNT: 13.7 % (ref 10–15)
HBA1C MFR BLD: 8 % (ref 0–5.6)
HCT VFR BLD AUTO: 42.5 % (ref 35–47)
HGB BLD-MCNC: 13.3 G/DL (ref 11.7–15.7)
IMM GRANULOCYTES # BLD: 0.3 10E9/L (ref 0–0.4)
IMM GRANULOCYTES NFR BLD: 1.6 %
LYMPHOCYTES # BLD AUTO: 5.4 10E9/L (ref 0.8–5.3)
LYMPHOCYTES NFR BLD AUTO: 32.4 %
MCH RBC QN AUTO: 30.6 PG (ref 26.5–33)
MCHC RBC AUTO-ENTMCNC: 31.3 G/DL (ref 31.5–36.5)
MCV RBC AUTO: 98 FL (ref 78–100)
MICROALBUMIN UR-MCNC: 28 MG/L
MICROALBUMIN/CREAT UR: 13.38 MG/G CR (ref 0–25)
MONOCYTES # BLD AUTO: 1.2 10E9/L (ref 0–1.3)
MONOCYTES NFR BLD AUTO: 7.4 %
NEUTROPHILS # BLD AUTO: 9.4 10E9/L (ref 1.6–8.3)
NEUTROPHILS NFR BLD AUTO: 56.4 %
NRBC # BLD AUTO: 0 10*3/UL
NRBC BLD AUTO-RTO: 0 /100
PLATELET # BLD AUTO: 435 10E9/L (ref 150–450)
RBC # BLD AUTO: 4.35 10E12/L (ref 3.8–5.2)
TSH SERPL DL<=0.005 MIU/L-ACNC: 0.58 MU/L (ref 0.4–4)
WBC # BLD AUTO: 16.7 10E9/L (ref 4–11)

## 2020-01-14 PROCEDURE — 83036 HEMOGLOBIN GLYCOSYLATED A1C: CPT | Performed by: NURSE PRACTITIONER

## 2020-01-14 PROCEDURE — 82043 UR ALBUMIN QUANTITATIVE: CPT | Performed by: NURSE PRACTITIONER

## 2020-01-14 PROCEDURE — 85025 COMPLETE CBC W/AUTO DIFF WBC: CPT | Performed by: FAMILY MEDICINE

## 2020-01-14 PROCEDURE — 84443 ASSAY THYROID STIM HORMONE: CPT | Performed by: NURSE PRACTITIONER

## 2020-01-14 PROCEDURE — 36415 COLL VENOUS BLD VENIPUNCTURE: CPT | Performed by: FAMILY MEDICINE

## 2020-01-20 ENCOUNTER — OFFICE VISIT (OUTPATIENT)
Dept: FAMILY MEDICINE | Facility: CLINIC | Age: 35
End: 2020-01-20
Payer: MEDICARE

## 2020-01-20 VITALS
DIASTOLIC BLOOD PRESSURE: 60 MMHG | RESPIRATION RATE: 16 BRPM | OXYGEN SATURATION: 98 % | TEMPERATURE: 98.4 F | SYSTOLIC BLOOD PRESSURE: 132 MMHG | BODY MASS INDEX: 31.89 KG/M2 | WEIGHT: 186.8 LBS | HEIGHT: 64 IN | HEART RATE: 93 BPM

## 2020-01-20 DIAGNOSIS — E11.22 TYPE 2 DIABETES MELLITUS WITH STAGE 3 CHRONIC KIDNEY DISEASE, WITH LONG-TERM CURRENT USE OF INSULIN (H): Primary | ICD-10-CM

## 2020-01-20 DIAGNOSIS — Z79.4 TYPE 2 DIABETES MELLITUS WITH STAGE 3 CHRONIC KIDNEY DISEASE, WITH LONG-TERM CURRENT USE OF INSULIN (H): Primary | ICD-10-CM

## 2020-01-20 DIAGNOSIS — N18.30 TYPE 2 DIABETES MELLITUS WITH STAGE 3 CHRONIC KIDNEY DISEASE, WITH LONG-TERM CURRENT USE OF INSULIN (H): Primary | ICD-10-CM

## 2020-01-20 DIAGNOSIS — K75.81 NASH (NONALCOHOLIC STEATOHEPATITIS): Primary | ICD-10-CM

## 2020-01-20 DIAGNOSIS — J45.21 MILD INTERMITTENT ASTHMA WITH ACUTE EXACERBATION: ICD-10-CM

## 2020-01-20 PROCEDURE — 99214 OFFICE O/P EST MOD 30 MIN: CPT | Performed by: NURSE PRACTITIONER

## 2020-01-20 RX ORDER — ALBUTEROL SULFATE 90 UG/1
2 AEROSOL, METERED RESPIRATORY (INHALATION) EVERY 6 HOURS PRN
Qty: 1 INHALER | Refills: 3 | Status: SHIPPED | OUTPATIENT
Start: 2020-01-20 | End: 2020-04-23

## 2020-01-20 ASSESSMENT — MIFFLIN-ST. JEOR: SCORE: 1537.32

## 2020-01-20 NOTE — PATIENT INSTRUCTIONS
Can increase Lantus by 2 units every 3-4 days until average blood sugars running around 140    Start exercising more regularly.    Monitor blood sugars 3 times daily

## 2020-01-20 NOTE — PROGRESS NOTES
Subjective     Divina Delgadillo is a 34 year old female who presents to clinic today for the following health issues:    Chief Complaint   Patient presents with     Diabetes     Refill Request     Albuterol Inhaler        HPI   Diabetes Follow-up    How often are you checking your blood sugar? One time daily. Blood sugars have been in the 130's. But she is only checking it once daily in AM   What time of day are you checking your blood sugars (select all that apply)?  Before and after meals  Have you had any blood sugars above 200?  Yes   Have you had any blood sugars below 70?  No    What symptoms do you notice when your blood sugar is low?  None    What concerns do you have today about your diabetes? None     Do you have any of these symptoms? (Select all that apply)  No numbness or tingling in feet.  No redness, sores or blisters on feet.  No complaints of excessive thirst.  No reports of blurry vision.  No significant changes to weight.    Hyperlipidemia Follow-Up      Are you regularly taking any medication or supplement to lower your cholesterol?   Yes- Lipitor     Are you having muscle aches or other side effects that you think could be caused by your cholesterol lowering medication?  No    Hypertension Follow-up      Do you check your blood pressure regularly outside of the clinic? No     Are you following a low salt diet? No    Are your blood pressures ever more than 140 on the top number (systolic) OR more   than 90 on the bottom number (diastolic), for example 140/90? No    BP Readings from Last 2 Encounters:   01/20/20 132/60   12/04/19 110/54     Hemoglobin A1C (%)   Date Value   01/14/2020 8.0 (H)   10/17/2019 8.1 (H)     LDL Cholesterol Calculated (mg/dL)   Date Value   01/02/2020 78   07/12/2019 192 (H)       Reviewed and updated as needed this visit by Provider         Review of Systems   ROS COMP: Constitutional, HEENT, cardiovascular, pulmonary, gi and gu systems are negative, except as otherwise  "noted.      Objective    /60 (BP Location: Right arm, Patient Position: Sitting, Cuff Size: Adult Regular)   Pulse 93   Temp 98.4  F (36.9  C) (Tympanic)   Resp 16   Ht 1.626 m (5' 4\")   Wt 84.7 kg (186 lb 12.8 oz)   SpO2 98%   BMI 32.06 kg/m    Body mass index is 32.06 kg/m .  Physical Exam   GENERAL: healthy, alert and no distress  RESP: lungs clear to auscultation - no rales, rhonchi or wheezes  CV: regular rates and rhythm, normal S1 S2, no S3 or S4, no murmur, click or rub, peripheral pulses strong and no peripheral edema  MS: no gross musculoskeletal defects noted, no edema  SKIN: no suspicious lesions or rashes  NEURO: Normal strength and tone, mentation intact and speech normal  PSYCH: mentation appears normal, affect normal/bright    Diagnostic Test Results:  Labs reviewed in Epic  Hemoglobin A1C   Date Value Ref Range Status   01/14/2020 8.0 (H) 0 - 5.6 % Final     Comment:     Normal <5.7% Prediabetes 5.7-6.4%  Diabetes 6.5% or higher - adopted from ADA   consensus guidelines.         Assessment & Plan     1. Type 2 diabetes mellitus with stage 3 chronic kidney disease, with long-term current use of insulin (H)  -uncontrolled  -needs to work on her diet, weight loss and start exercising regularly  -increase Lantus by 2 units every 3-4 days until average numbers ranging around 140'. Maximum 25 units per day  -follow up in 3 months   - A1C FUTURE anytime; Future  - Basic metabolic panel FUTURE anytime; Future    2. Mild intermittent asthma with acute exacerbation  -stable, well controlled, refill provided   - albuterol (PROAIR HFA/PROVENTIL HFA/VENTOLIN HFA) 108 (90 Base) MCG/ACT inhaler; Inhale 2 puffs into the lungs every 6 hours as needed for shortness of breath / dyspnea or wheezing  Dispense: 1 Inhaler; Refill: 3       See Patient Instructions    Return in about 3 months (around 4/20/2020) for Routine Visit.    VERONIQUE Spears [facility]      "

## 2020-01-22 ASSESSMENT — ASTHMA QUESTIONNAIRES
ACT_TOTALSCORE: 20
QUESTION_3 LAST FOUR WEEKS HOW OFTEN DID YOUR ASTHMA SYMPTOMS (WHEEZING, COUGHING, SHORTNESS OF BREATH, CHEST TIGHTNESS OR PAIN) WAKE YOU UP AT NIGHT OR EARLIER THAN USUAL IN THE MORNING: ONCE OR TWICE
QUESTION_4 LAST FOUR WEEKS HOW OFTEN HAVE YOU USED YOUR RESCUE INHALER OR NEBULIZER MEDICATION (SUCH AS ALBUTEROL): ONCE A WEEK OR LESS
QUESTION_2 LAST FOUR WEEKS HOW OFTEN HAVE YOU HAD SHORTNESS OF BREATH: ONCE OR TWICE A WEEK
QUESTION_1 LAST FOUR WEEKS HOW MUCH OF THE TIME DID YOUR ASTHMA KEEP YOU FROM GETTING AS MUCH DONE AT WORK, SCHOOL OR AT HOME: A LITTLE OF THE TIME
QUESTION_5 LAST FOUR WEEKS HOW WOULD YOU RATE YOUR ASTHMA CONTROL: WELL CONTROLLED

## 2020-01-23 ASSESSMENT — ASTHMA QUESTIONNAIRES: ACT_TOTALSCORE: 20

## 2020-01-28 DIAGNOSIS — I10 ESSENTIAL HYPERTENSION: ICD-10-CM

## 2020-01-28 NOTE — TELEPHONE ENCOUNTER
"Requested Prescriptions   Pending Prescriptions Disp Refills     carvedilol (COREG) 6.25 MG tablet [Pharmacy Med Name: CARVEDILOL 6.25 MG TABLET] 90 tablet      Sig: TAKE 2 TABLETS BY MOUTH EVERY MORNING AND TAKE 1 TABLET EVERY EVENING       Beta-Blockers Protocol Passed - 1/28/2020  8:01 AM        Passed - Blood pressure under 140/90 in past 12 months     BP Readings from Last 3 Encounters:   01/20/20 132/60   12/04/19 110/54   11/14/19 113/55                 Passed - Patient is age 6 or older        Passed - Recent (12 mo) or future (30 days) visit within the authorizing provider's specialty     Patient has had an office visit with the authorizing provider or a provider within the authorizing providers department within the previous 12 mos or has a future within next 30 days. See \"Patient Info\" tab in inbasket, or \"Choose Columns\" in Meds & Orders section of the refill encounter.              Passed - Medication is active on med list        Last Written Prescription Date:  12/31/19  Last Fill Quantity: 270,  # refills: 3   Last office visit: 1/20/2020 with prescribing provider:  Eva   Future Office Visit:      "

## 2020-01-31 ENCOUNTER — TELEPHONE (OUTPATIENT)
Dept: GASTROENTEROLOGY | Facility: CLINIC | Age: 35
End: 2020-01-31

## 2020-01-31 RX ORDER — CARVEDILOL 6.25 MG/1
TABLET ORAL
Qty: 90 TABLET | OUTPATIENT
Start: 2020-01-31

## 2020-01-31 NOTE — TELEPHONE ENCOUNTER
Patient contacted and reminded of upcoming appointment.  Patient confirmed they will be attending.  Patient instructed to bring updated medications list to appointment.    Sophia Clarke on 1/31/2020 at 12:27 PM

## 2020-02-03 ENCOUNTER — OFFICE VISIT (OUTPATIENT)
Dept: GASTROENTEROLOGY | Facility: CLINIC | Age: 35
End: 2020-02-03
Attending: INTERNAL MEDICINE
Payer: MEDICARE

## 2020-02-03 VITALS
WEIGHT: 179.5 LBS | BODY MASS INDEX: 30.81 KG/M2 | SYSTOLIC BLOOD PRESSURE: 152 MMHG | DIASTOLIC BLOOD PRESSURE: 77 MMHG | HEART RATE: 86 BPM | OXYGEN SATURATION: 98 %

## 2020-02-03 DIAGNOSIS — Z79.899 HIGH RISK MEDICATION USE: ICD-10-CM

## 2020-02-03 DIAGNOSIS — K75.81 NASH (NONALCOHOLIC STEATOHEPATITIS): ICD-10-CM

## 2020-02-03 DIAGNOSIS — Z79.899 LONG TERM CURRENT USE OF CLOZAPINE: ICD-10-CM

## 2020-02-03 DIAGNOSIS — K71.9 DRUG-INDUCED LIVER INJURY: ICD-10-CM

## 2020-02-03 DIAGNOSIS — K83.1 CHOLESTATIC LIVER DISEASE (H): Primary | ICD-10-CM

## 2020-02-03 LAB
ALBUMIN SERPL-MCNC: 3.6 G/DL (ref 3.4–5)
ALP SERPL-CCNC: 198 U/L (ref 40–150)
ALT SERPL W P-5'-P-CCNC: 46 U/L (ref 0–50)
ANION GAP SERPL CALCULATED.3IONS-SCNC: 6 MMOL/L (ref 3–14)
AST SERPL W P-5'-P-CCNC: 29 U/L (ref 0–45)
BILIRUB DIRECT SERPL-MCNC: 0.1 MG/DL (ref 0–0.2)
BILIRUB SERPL-MCNC: 0.4 MG/DL (ref 0.2–1.3)
BUN SERPL-MCNC: 25 MG/DL (ref 7–30)
CALCIUM SERPL-MCNC: 9.3 MG/DL (ref 8.5–10.1)
CHLORIDE SERPL-SCNC: 111 MMOL/L (ref 94–109)
CO2 SERPL-SCNC: 24 MMOL/L (ref 20–32)
CREAT SERPL-MCNC: 1.37 MG/DL (ref 0.52–1.04)
ERYTHROCYTE [DISTWIDTH] IN BLOOD BY AUTOMATED COUNT: 14.2 % (ref 10–15)
GFR SERPL CREATININE-BSD FRML MDRD: 50 ML/MIN/{1.73_M2}
GLUCOSE SERPL-MCNC: 114 MG/DL (ref 70–99)
HCT VFR BLD AUTO: 41.9 % (ref 35–47)
HGB BLD-MCNC: 13.3 G/DL (ref 11.7–15.7)
INR PPP: 0.97 (ref 0.86–1.14)
MCH RBC QN AUTO: 31.2 PG (ref 26.5–33)
MCHC RBC AUTO-ENTMCNC: 31.7 G/DL (ref 31.5–36.5)
MCV RBC AUTO: 98 FL (ref 78–100)
PLATELET # BLD AUTO: 417 10E9/L (ref 150–450)
POTASSIUM SERPL-SCNC: 4.8 MMOL/L (ref 3.4–5.3)
PROT SERPL-MCNC: 8.5 G/DL (ref 6.8–8.8)
RBC # BLD AUTO: 4.26 10E12/L (ref 3.8–5.2)
SODIUM SERPL-SCNC: 140 MMOL/L (ref 133–144)
WBC # BLD AUTO: 11.8 10E9/L (ref 4–11)

## 2020-02-03 PROCEDURE — 80048 BASIC METABOLIC PNL TOTAL CA: CPT | Performed by: INTERNAL MEDICINE

## 2020-02-03 PROCEDURE — 36415 COLL VENOUS BLD VENIPUNCTURE: CPT | Performed by: INTERNAL MEDICINE

## 2020-02-03 PROCEDURE — 80076 HEPATIC FUNCTION PANEL: CPT | Performed by: INTERNAL MEDICINE

## 2020-02-03 PROCEDURE — 85610 PROTHROMBIN TIME: CPT | Performed by: INTERNAL MEDICINE

## 2020-02-03 PROCEDURE — 85027 COMPLETE CBC AUTOMATED: CPT | Performed by: INTERNAL MEDICINE

## 2020-02-03 PROCEDURE — G0463 HOSPITAL OUTPT CLINIC VISIT: HCPCS | Mod: ZF

## 2020-02-03 ASSESSMENT — PAIN SCALES - GENERAL: PAINLEVEL: NO PAIN (0)

## 2020-02-03 NOTE — LETTER
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    Since last seen in hepatology clinic, she underwent a liver biopsy on November 14, 2019.  This was an excellent sample with 18 portal tracts in the biopsy, with noted mild ductopenia without evidence of active parenchymal or active biliary tract disease.  Noted that the ductopenia was impacting 5 of the 18 portal tracts noted on histology.    Over the last 3 weeks she has joined a gym, and goes on a near daily basis and walks on a treadmill for approximately 1 hour.  She notes that she is made significant dietary changes as she is increased the fresh vegetables that she is eating.  She is notes that she is lost weight and overall physically feels much better.    Past Medical History:  Past Medical History:   Diagnosis Date     Cervical high risk HPV (human papillomavirus) test positive 6/20/2017     Depressive disorder, not elsewhere classified      DVT (deep venous thrombosis) (H)      Dysmetabolic syndrome X      Esophageal reflux     GERD     Mild intermittent asthma      Other abnormal heart sounds     Heart murmur     Other specified types of schizophrenia, unspecified condition      Pancreatic cancer (H)     left adrenal gland, partial pancreas, and spleen removed; no chemo or radiation.      Type II or unspecified type diabetes mellitus without mention of complication, not stated as uncontrolled       Unspecified disorder of tympanic membrane        Surgical History:  Past Surgical History:   Procedure Laterality Date     ADRENALECTOMY       IR LIVER BIOPSY PERCUTANEOUS  2019     PANCREATECTOMY PARTIAL       PERCUTANEOUS BIOPSY LIVER Right 2019    Procedure: Percutaneous Liver Biopsy;  Surgeon: Sean Guzman PA-C;  Location: [facility] OR     SPLENECTOMY       SURGICAL HISTORY OF -   10/1/2000    Tympanoplasty     SURGICAL HISTORY OF -   10/1/2000    Tonsillectomy       Social History:  Social History     Tobacco Use     Smoking status: Current Every Day Smoker     Packs/day: 1.00     Years: 4.00     Pack years: 4.00     Types: Cigarettes     Last attempt to quit: 2019     Years since quittin.6     Smokeless tobacco: Never Used     Tobacco comment: 15 cigarettes a day    Substance Use Topics     Alcohol use: No     Drug use: No        Family History:  Family History   Problem Relation Age of Onset     Respiratory Mother         ASTHMA     Allergies Mother      Depression Mother      Heart Disease Father         HEART VALVE REPLACEMENT     Hypertension Father      Diabetes Father      Depression Father      Respiratory Brother      Allergies Brother      Respiratory Sister      Allergies Sister      Depression Sister      Respiratory Sister      Allergies Sister      Depression Sister      Kidney Disease No family hx of        Medications:  Current Outpatient Medications   Medication     amLODIPine (NORVASC) 5 MG tablet     ARIPiprazole (ABILIFY) 30 MG tablet     atorvastatin (LIPITOR) 10 MG tablet     blood glucose (ACCU-CHEK GUIDE) test strip     blood glucose monitoring (ACCU-CHEK FASTCLIX) lancets     carvedilol (COREG) 6.25 MG tablet     Clozapine (CLOZARIL) 200 MG tablet     FLUoxetine (PROZAC) 20 MG capsule     insulin glargine (LANTUS SOLOSTAR) 100 UNIT/ML pen     lamoTRIgine (LAMICTAL) 25 MG tablet     lisinopril (PRINIVIL/ZESTRIL) 10 MG tablet     loratadine (CLARITIN) 10 MG  tablet     montelukast (SINGULAIR) 10 MG tablet     omeprazole (PRILOSEC) 20 MG DR capsule     ranitidine (ZANTAC) 300 MG tablet     albuterol (PROAIR HFA/PROVENTIL HFA/VENTOLIN HFA) 108 (90 Base) MCG/ACT inhaler     famotidine (PEPCID) 40 MG tablet     nicotine (NICOTROL) 10 MG inhaler     semaglutide (OZEMPIC) 1 MG/DOSE pen     ULTICARE MINI 31G X 6 MM insulin pen needle     No current facility-administered medications for this visit.        Review of Systems  A complete 10 point review of systems was asked and answered in the negative unless specifically commented upon in the HPI    Objective:         Vitals:    02/03/20 1058   BP: (!) 152/77   Pulse: 86   SpO2: 98%   Weight: 81.4 kg (179 lb 8 oz)     Body mass index is 30.81 kg/m .     Physical Exam    Vitals reviewed.   Constitutional: Well-developed, well-nourished, in no apparent distress.    HEENT: Normocephalic. no scleral icterus. Moist oral mucosa. Dentition moderate  Neck/Lymph: Normal ROM, supple.  Cardiac:  Regular rate and rhythm.   Respiratory: Normal respiratory excursion  GI:  Abdomen soft, obese, non-tender. BS present. no shifting dullness.   Skin:  Skin is warm and dry. No rash noted.  no jaundice. no spider nevi noted.  no palmar erythema  Peripheral Vascular: no lower extremity edema. 2+ pulses in all extremities  Musculoskeletal:  ROM intact, normal muscle bulk    Psychiatric: Normal mood and affect. Behavior is normal.  Neuro:  no asterixis, no tremor    Labs and Diagnostic tests:  Lab Results   Component Value Date    BILITOTAL 0.4 02/03/2020    ALT 46 02/03/2020    AST 29 02/03/2020    ALKPHOS 198 02/03/2020     Lab Results   Component Value Date    ALBUMIN 3.6 02/03/2020    PROTTOTAL 8.5 02/03/2020      Imaging:  Abd US 7/13/2019  FINDINGS:    Gallbladder: Contracted. No gallstones demonstrated.  Bile ducts: CHD is normal diameter. No intrahepatic biliary dilatation.  Liver: Mild increased echogenicity without focal lesion.   Pancreas:  Normal  Spleen: Surgically absent.   Right kidney: Normal.   Left kidney: Normal.  Aorta and IVC: Normal.       Procedures:  Liver biopsy: 11/14/2019  FINAL DIAGNOSIS:   LIVER, NEEDLE BIOPSY:   - Mild ductopenia with no evidence of active parenchymal or active biliary    tract disease.    MICROSCOPIC:   - Core needle biopsies are adequate for evaluation and show liver parenchyma with approximately 18 portal tracts.    - H&E sections show hepatic lobules with minimal steatosis (<5%)   and no ballooning degeneration, inflammation, or cholestasis.   - The portal tracts are remarkable for ductopenia, with absent bile ducts in   approximately 5 of the portal tracts (highlighted with CK7   immunohistochemistry), along with ceroid-laden macrophages.    - CK7 also stains periportal hepatocytes in areas of ductopenia.  The portal tracts do not contain significant inflammation or show interface activity.  - Ductular proliferation is not a prominent feature.   Florid duct lesions are not present.  There is no evidence of significant   fibrosis on trichrome stain.   -Special stain for reticulin shows preserved liver plate architecture.     The main finding in this biopsy is the presence of mild ductopenia,   without evidence of active duct injury.   An underlying cause for the ductopenia is not apparent in this biopsy, but    may have been secondary to an episode of prior bile duct injury (e.g. drug reaction) or idiopathic adult ductopenia.    There is only minimal steatosis in this biopsy and there is no evidence of steatohepatitis or significant fibrosis.       Assessment and Plan:    Abnormal Liver Tests:    -She has been followed in hepatology clinic for many years, and her risk factors, her obesity, and her imaging were all suggestive of and plan towards a diagnosis of nonalcoholic fatty liver disease.  Noted that she has had some difficulty controlled diabetes in the setting of her partial pancreatectomy for removal of a  neoplasm  -Liver biopsy in November 2019 demonstrated a relatively unexpected finding of mild ductopenia without significant hepatic steatosis and no evidence of hepatic fibrosis.  -Ductopenia can occur in ischemic injuries, can be present post transplant for idiopathic reasons, but most often is related to a drug-related liver injury.  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  As the patient has fairly mild ductopenia, it is felt that this is a reversible process at this time.  -I had very long discussion with the patient and her foster mother today regarding the risks and benefits of discontinuation of the clozapine with initiation of other antipsychotic medications.  It does not tend to be a class effect, although other antipsychotic medications are implicated in liver injury.  It is noted that she has very good control of her mental health at this time and the patient and her care provider are appropriately hesitant about changing the medication  -I recommended that they have a full discussion with their prescribing psychiatrist in regards to the risks and benefits of continuing on this medication.  At this time I have no way to predict how quickly the patient will progress and develop worsening ductopenia which might be irreversible, or even if she would progress.      Routine Health Care in Patient with Chronic Liver Disease:  - We recommend screening for hepatitis A and B, please vaccinate if not immune  - All patients with liver disease, particularly those with cholestatic liver disease, are at an increased risk for  osteoporosis.  We strongly recommend screening for Vitamin D deficiency at least twice yearly with aggressive supplementation/replacement as indicated.    - We also recommend a screening DEXA scan to evaluate for osteoporosis.  If present, should treat with calcium, Vitamin D supplementation, and recommend consideration of bisphosphonate therapy.  Also recommend follow up DEXA scans to evaluate for improvement of bone density on therapy.  - All patients with liver disease should avoid the use of Non-steroidal Anti-Inflammatory (NSAID) medications as they can cause significant injury to the kidneys in this population    Follow Up:  6 months     Thank you very much for the opportunity to participate in the care of this patient.  If you have any further questions, please don't hesitate to contact our office.    Thomas M. Leventhal, M.D.   of Medicine  Advanced & Transplant Hepatology  [facility]    Thomas M. Leventhal, MD

## 2020-02-03 NOTE — NURSING NOTE
Chief Complaint   Patient presents with     RECHECK     liver check     Blood pressure (!) 152/77, pulse 86, weight 81.4 kg (179 lb 8 oz), SpO2 98 %, not currently breastfeeding.    Humble Brandon/DIEGO  February 3, 2020 12:47 PM

## 2020-02-03 NOTE — PROGRESS NOTES
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    Since last seen in hepatology clinic, she underwent a liver biopsy on November 14, 2019.  This was an excellent sample with 18 portal tracts in the biopsy, with noted mild ductopenia without evidence of active parenchymal or active biliary tract disease.  Noted that the ductopenia was impacting 5 of the 18 portal tracts noted on histology.    Over the last 3 weeks she has joined a gym, and goes on a near daily basis and walks on a treadmill for approximately 1 hour.  She notes that she is made significant dietary changes as she is increased the fresh vegetables that she is eating.  She is notes that she is lost weight and overall physically feels much better.    Past Medical History:  Past Medical History:   Diagnosis Date     Cervical high risk HPV (human papillomavirus) test positive 6/20/2017     Depressive disorder, not elsewhere classified      DVT (deep venous thrombosis) (H)      Dysmetabolic syndrome X      Esophageal reflux     GERD     Mild intermittent asthma      Other abnormal heart sounds     Heart murmur     Other specified types of schizophrenia, unspecified condition      Pancreatic cancer (H)     left adrenal gland, partial pancreas, and spleen removed; no chemo or radiation.      Type II or unspecified type diabetes mellitus without mention of complication, not stated as uncontrolled      Unspecified disorder of tympanic membrane        Surgical History:  Past Surgical  History:   Procedure Laterality Date     ADRENALECTOMY       IR LIVER BIOPSY PERCUTANEOUS  2019     PANCREATECTOMY PARTIAL       PERCUTANEOUS BIOPSY LIVER Right 2019    Procedure: Percutaneous Liver Biopsy;  Surgeon: Sean Guzman PA-C;  Location: [facility] OR     SPLENECTOMY       SURGICAL HISTORY OF -   10/1/2000    Tympanoplasty     SURGICAL HISTORY OF -   10/1/2000    Tonsillectomy       Social History:  Social History     Tobacco Use     Smoking status: Current Every Day Smoker     Packs/day: 1.00     Years: 4.00     Pack years: 4.00     Types: Cigarettes     Last attempt to quit: 2019     Years since quittin.6     Smokeless tobacco: Never Used     Tobacco comment: 15 cigarettes a day    Substance Use Topics     Alcohol use: No     Drug use: No        Family History:  Family History   Problem Relation Age of Onset     Respiratory Mother         ASTHMA     Allergies Mother      Depression Mother      Heart Disease Father         HEART VALVE REPLACEMENT     Hypertension Father      Diabetes Father      Depression Father      Respiratory Brother      Allergies Brother      Respiratory Sister      Allergies Sister      Depression Sister      Respiratory Sister      Allergies Sister      Depression Sister      Kidney Disease No family hx of        Medications:  Current Outpatient Medications   Medication     amLODIPine (NORVASC) 5 MG tablet     ARIPiprazole (ABILIFY) 30 MG tablet     atorvastatin (LIPITOR) 10 MG tablet     blood glucose (ACCU-CHEK GUIDE) test strip     blood glucose monitoring (ACCU-CHEK FASTCLIX) lancets     carvedilol (COREG) 6.25 MG tablet     Clozapine (CLOZARIL) 200 MG tablet     FLUoxetine (PROZAC) 20 MG capsule     insulin glargine (LANTUS SOLOSTAR) 100 UNIT/ML pen     lamoTRIgine (LAMICTAL) 25 MG tablet     lisinopril (PRINIVIL/ZESTRIL) 10 MG tablet     loratadine (CLARITIN) 10 MG tablet     montelukast (SINGULAIR) 10 MG tablet     omeprazole (PRILOSEC) 20 MG   capsule     ranitidine (ZANTAC) 300 MG tablet     albuterol (PROAIR HFA/PROVENTIL HFA/VENTOLIN HFA) 108 (90 Base) MCG/ACT inhaler     famotidine (PEPCID) 40 MG tablet     nicotine (NICOTROL) 10 MG inhaler     semaglutide (OZEMPIC) 1 MG/DOSE pen     ULTICARE MINI 31G X 6 MM insulin pen needle     No current facility-administered medications for this visit.        Review of Systems  A complete 10 point review of systems was asked and answered in the negative unless specifically commented upon in the HPI    Objective:         Vitals:    02/03/20 1058   BP: (!) 152/77   Pulse: 86   SpO2: 98%   Weight: 81.4 kg (179 lb 8 oz)     Body mass index is 30.81 kg/m .     Physical Exam    Vitals reviewed.   Constitutional: Well-developed, well-nourished, in no apparent distress.    HEENT: Normocephalic. no scleral icterus. Moist oral mucosa. Dentition moderate  Neck/Lymph: Normal ROM, supple.  Cardiac:  Regular rate and rhythm.   Respiratory: Normal respiratory excursion  GI:  Abdomen soft, obese, non-tender. BS present. no shifting dullness.   Skin:  Skin is warm and dry. No rash noted.  no jaundice. no spider nevi noted.  no palmar erythema  Peripheral Vascular: no lower extremity edema. 2+ pulses in all extremities  Musculoskeletal:  ROM intact, normal muscle bulk    Psychiatric: Normal mood and affect. Behavior is normal.  Neuro:  no asterixis, no tremor    Labs and Diagnostic tests:  Lab Results   Component Value Date    BILITOTAL 0.4 02/03/2020    ALT 46 02/03/2020    AST 29 02/03/2020    ALKPHOS 198 02/03/2020     Lab Results   Component Value Date    ALBUMIN 3.6 02/03/2020    PROTTOTAL 8.5 02/03/2020      Imaging:  Abd US 7/13/2019  FINDINGS:    Gallbladder: Contracted. No gallstones demonstrated.  Bile ducts: CHD is normal diameter. No intrahepatic biliary dilatation.  Liver: Mild increased echogenicity without focal lesion.   Pancreas: Normal  Spleen: Surgically absent.   Right kidney: Normal.   Left kidney:  Normal.  Aorta and IVC: Normal.       Procedures:  Liver biopsy: 11/14/2019  FINAL DIAGNOSIS:   LIVER, NEEDLE BIOPSY:   - Mild ductopenia with no evidence of active parenchymal or active biliary    tract disease.    MICROSCOPIC:   - Core needle biopsies are adequate for evaluation and show liver parenchyma with approximately 18 portal tracts.    - H&E sections show hepatic lobules with minimal steatosis (<5%)   and no ballooning degeneration, inflammation, or cholestasis.   - The portal tracts are remarkable for ductopenia, with absent bile ducts in   approximately 5 of the portal tracts (highlighted with CK7   immunohistochemistry), along with ceroid-laden macrophages.    - CK7 also stains periportal hepatocytes in areas of ductopenia.  The portal tracts do not contain significant inflammation or show interface activity.  - Ductular proliferation is not a prominent feature.   Florid duct lesions are not present.  There is no evidence of significant   fibrosis on trichrome stain.   -Special stain for reticulin shows preserved liver plate architecture.     The main finding in this biopsy is the presence of mild ductopenia,   without evidence of active duct injury.   An underlying cause for the ductopenia is not apparent in this biopsy, but    may have been secondary to an episode of prior bile duct injury (e.g. drug reaction) or idiopathic adult ductopenia.    There is only minimal steatosis in this biopsy and there is no evidence of steatohepatitis or significant fibrosis.       Assessment and Plan:    Abnormal Liver Tests:    -She has been followed in hepatology clinic for many years, and her risk factors, her obesity, and her imaging were all suggestive of and plan towards a diagnosis of nonalcoholic fatty liver disease.  Noted that she has had some difficulty controlled diabetes in the setting of her partial pancreatectomy for removal of a neoplasm  -Liver biopsy in November 2019 demonstrated a relatively  unexpected finding of mild ductopenia without significant hepatic steatosis and no evidence of hepatic fibrosis.  -Ductopenia can occur in ischemic injuries, can be present post transplant for idiopathic reasons, but most often is related to a drug-related liver injury.  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  As the patient has fairly mild ductopenia, it is felt that this is a reversible process at this time.  -I had very long discussion with the patient and her foster mother today regarding the risks and benefits of discontinuation of the clozapine with initiation of other antipsychotic medications.  It does not tend to be a class effect, although other antipsychotic medications are implicated in liver injury.  It is noted that she has very good control of her mental health at this time and the patient and her care provider are appropriately hesitant about changing the medication  -I recommended that they have a full discussion with their prescribing psychiatrist in regards to the risks and benefits of continuing on this medication.  At this time I have no way to predict how quickly the patient will progress and develop worsening ductopenia which might be irreversible, or even if she would progress.      Routine Health Care in Patient with Chronic Liver Disease:  - We recommend screening for hepatitis A and B, please vaccinate if not immune  - All patients with liver disease, particularly those with cholestatic liver disease, are at an increased risk for osteoporosis.  We strongly recommend screening for Vitamin D deficiency at  least twice yearly with aggressive supplementation/replacement as indicated.    - We also recommend a screening DEXA scan to evaluate for osteoporosis.  If present, should treat with calcium, Vitamin D supplementation, and recommend consideration of bisphosphonate therapy.  Also recommend follow up DEXA scans to evaluate for improvement of bone density on therapy.  - All patients with liver disease should avoid the use of Non-steroidal Anti-Inflammatory (NSAID) medications as they can cause significant injury to the kidneys in this population    Follow Up:  6 months     Thank you very much for the opportunity to participate in the care of this patient.  If you have any further questions, please don't hesitate to contact our office.    Thomas M. Leventhal, M.D.   of Medicine  Advanced & Transplant Hepatology  [facility]

## 2020-02-03 NOTE — PATIENT INSTRUCTIONS
- Keep doing what you are doing in regard to your lifestyle modifications!!!!! (remember that your weight is NOT whats important, you feeling well is what is important!)    - Based on the liver biopsy, your medications, your history, and your labs, I think that the clozapine is the most likely cause of you abnormal liver tests.  If there is a safe and effective alternative to clozapine for management of your medical issues, we would recommend this be considered.

## 2020-02-05 DIAGNOSIS — I10 ESSENTIAL HYPERTENSION: ICD-10-CM

## 2020-02-05 NOTE — TELEPHONE ENCOUNTER
"Requested Prescriptions   Pending Prescriptions Disp Refills     carvedilol (COREG) 6.25 MG tablet [Pharmacy Med Name: CARVEDILOL 6.25 MG TABLET]  Last Written Prescription Date:  12/31/19  Last Fill Quantity: 270,  # refills: 3   Last Office Visit with [facility], [facility] or [facility] prescribing provider:  1/20/20   Future Office Visit:    Next 5 appointments (look out 90 days)    Feb 27, 2020  1:40 PM CST  Pre-Op physical with VERONIQUE Bledsoe CNP  [facility]  Arrive at: [facility] [address] [facility] [address]  [phone]          90 tablet      Sig: TAKE 2 TABLETS BY MOUTH EVERY MORNING AND TAKE 1 TABLET EVERY EVENING       Beta-Blockers Protocol Failed - 2/5/2020 11:41 AM        Failed - Blood pressure under 140/90 in past 12 months     BP Readings from Last 3 Encounters:   02/03/20 (!) 152/77   01/20/20 132/60   12/04/19 110/54                 Passed - Patient is age 6 or older        Passed - Recent (12 mo) or future (30 days) visit within the authorizing provider's specialty     Patient has had an office visit with the authorizing provider or a provider within the authorizing providers department within the previous 12 mos or has a future within next 30 days. See \"Patient Info\" tab in inbasket, or \"Choose Columns\" in Meds & Orders section of the refill encounter.              Passed - Medication is active on med list          "

## 2020-02-07 RX ORDER — CARVEDILOL 6.25 MG/1
TABLET ORAL
Qty: 90 TABLET | Refills: 3 | Status: ON HOLD | OUTPATIENT
Start: 2020-02-07 | End: 2020-05-26

## 2020-02-07 NOTE — TELEPHONE ENCOUNTER
Routing refill request to provider for review/approval because:  Last BP over goal.     BP Readings from Last 3 Encounters:   02/03/20 (!) 152/77   01/20/20 132/60   12/04/19 110/54     LOV 01/20/20 with PCP.      GABI MixonN, RN

## 2020-02-17 DIAGNOSIS — E11.22 TYPE 2 DIABETES MELLITUS WITH STAGE 3 CHRONIC KIDNEY DISEASE, WITH LONG-TERM CURRENT USE OF INSULIN (H): ICD-10-CM

## 2020-02-17 DIAGNOSIS — N18.30 TYPE 2 DIABETES MELLITUS WITH STAGE 3 CHRONIC KIDNEY DISEASE, WITH LONG-TERM CURRENT USE OF INSULIN (H): ICD-10-CM

## 2020-02-17 DIAGNOSIS — Z79.4 TYPE 2 DIABETES MELLITUS WITH STAGE 3 CHRONIC KIDNEY DISEASE, WITH LONG-TERM CURRENT USE OF INSULIN (H): ICD-10-CM

## 2020-02-17 DIAGNOSIS — F25.0 SCHIZOAFFECTIVE DISORDER, BIPOLAR TYPE (H): ICD-10-CM

## 2020-02-17 LAB
ANION GAP SERPL CALCULATED.3IONS-SCNC: 5 MMOL/L (ref 3–14)
BASOPHILS # BLD AUTO: 0.1 10E9/L (ref 0–0.2)
BASOPHILS NFR BLD AUTO: 0.4 %
BUN SERPL-MCNC: 27 MG/DL (ref 7–30)
CALCIUM SERPL-MCNC: 9.2 MG/DL (ref 8.5–10.1)
CHLORIDE SERPL-SCNC: 109 MMOL/L (ref 94–109)
CO2 SERPL-SCNC: 24 MMOL/L (ref 20–32)
CREAT SERPL-MCNC: 1.83 MG/DL (ref 0.52–1.04)
DIFFERENTIAL METHOD BLD: ABNORMAL
EOSINOPHIL # BLD AUTO: 0.2 10E9/L (ref 0–0.7)
EOSINOPHIL NFR BLD AUTO: 1 %
ERYTHROCYTE [DISTWIDTH] IN BLOOD BY AUTOMATED COUNT: 14.1 % (ref 10–15)
GFR SERPL CREATININE-BSD FRML MDRD: 35 ML/MIN/{1.73_M2}
GLUCOSE SERPL-MCNC: 216 MG/DL (ref 70–99)
HBA1C MFR BLD: 7.4 % (ref 0–5.6)
HCT VFR BLD AUTO: 39.8 % (ref 35–47)
HGB BLD-MCNC: 12.3 G/DL (ref 11.7–15.7)
LYMPHOCYTES # BLD AUTO: 5.1 10E9/L (ref 0.8–5.3)
LYMPHOCYTES NFR BLD AUTO: 24.2 %
MCH RBC QN AUTO: 30.8 PG (ref 26.5–33)
MCHC RBC AUTO-ENTMCNC: 30.9 G/DL (ref 31.5–36.5)
MCV RBC AUTO: 100 FL (ref 78–100)
MONOCYTES # BLD AUTO: 1.4 10E9/L (ref 0–1.3)
MONOCYTES NFR BLD AUTO: 6.6 %
NEUTROPHILS # BLD AUTO: 14.2 10E9/L (ref 1.6–8.3)
NEUTROPHILS NFR BLD AUTO: 67.8 %
PLATELET # BLD AUTO: 398 10E9/L (ref 150–450)
POTASSIUM SERPL-SCNC: 4.5 MMOL/L (ref 3.4–5.3)
RBC # BLD AUTO: 4 10E12/L (ref 3.8–5.2)
SODIUM SERPL-SCNC: 138 MMOL/L (ref 133–144)
WBC # BLD AUTO: 21 10E9/L (ref 4–11)

## 2020-02-17 PROCEDURE — 36415 COLL VENOUS BLD VENIPUNCTURE: CPT | Performed by: CLINICAL NURSE SPECIALIST

## 2020-02-17 PROCEDURE — 85025 COMPLETE CBC W/AUTO DIFF WBC: CPT | Performed by: CLINICAL NURSE SPECIALIST

## 2020-02-17 PROCEDURE — 80048 BASIC METABOLIC PNL TOTAL CA: CPT | Performed by: CLINICAL NURSE SPECIALIST

## 2020-02-17 PROCEDURE — 83036 HEMOGLOBIN GLYCOSYLATED A1C: CPT | Performed by: CLINICAL NURSE SPECIALIST

## 2020-02-24 ENCOUNTER — TELEPHONE (OUTPATIENT)
Dept: FAMILY MEDICINE | Facility: CLINIC | Age: 35
End: 2020-02-24

## 2020-02-24 NOTE — TELEPHONE ENCOUNTER
Patient's caregiver is called.  Looks like this monthly blood test is through her mental health provider.  Caregiver should call them for the lab orders. Debora LOPEZ RN

## 2020-02-24 NOTE — TELEPHONE ENCOUNTER
Reason for Call: Request for an order or referral:    Order or referral being requested: [facility] (residence) calling for Patient  calling asking for labs to be ordered for next month.     Date needed: at your convenience    Has the patient been seen by the PCP for this problem? YES    Phone number Patient can be reached at:  Home number on file [phone] (home)    Best Time:  any    Can we leave a detailed message on this number?  YES    Call taken on 2/24/2020 at 9:25 AM by Luanne Lagos

## 2020-02-26 DIAGNOSIS — I10 ESSENTIAL HYPERTENSION: ICD-10-CM

## 2020-02-26 DIAGNOSIS — J45.21 MILD INTERMITTENT ASTHMA WITH ACUTE EXACERBATION: ICD-10-CM

## 2020-02-27 RX ORDER — LISINOPRIL 10 MG/1
TABLET ORAL
Qty: 30 TABLET | Refills: 5 | Status: SHIPPED | OUTPATIENT
Start: 2020-02-27 | End: 2020-05-21

## 2020-02-27 RX ORDER — MONTELUKAST SODIUM 10 MG/1
10 TABLET ORAL AT BEDTIME
Qty: 30 TABLET | Refills: 5 | Status: SHIPPED | OUTPATIENT
Start: 2020-02-27 | End: 2020-09-10

## 2020-04-17 ENCOUNTER — TELEPHONE (OUTPATIENT)
Dept: FAMILY MEDICINE | Facility: CLINIC | Age: 35
End: 2020-04-17

## 2020-04-17 DIAGNOSIS — E11.22 TYPE 2 DIABETES MELLITUS WITH STAGE 3 CHRONIC KIDNEY DISEASE, WITH LONG-TERM CURRENT USE OF INSULIN (H): Primary | ICD-10-CM

## 2020-04-17 DIAGNOSIS — Z79.4 TYPE 2 DIABETES MELLITUS WITH STAGE 3 CHRONIC KIDNEY DISEASE, WITH LONG-TERM CURRENT USE OF INSULIN (H): Primary | ICD-10-CM

## 2020-04-17 DIAGNOSIS — N18.30 TYPE 2 DIABETES MELLITUS WITH STAGE 3 CHRONIC KIDNEY DISEASE, WITH LONG-TERM CURRENT USE OF INSULIN (H): Primary | ICD-10-CM

## 2020-04-17 NOTE — TELEPHONE ENCOUNTER
Reason for Call: Request for an order or referral:    Order or referral being requested: A1c lab    Date needed: as soon as possible    Has the patient been seen by the PCP for this problem? YES    Additional comments: Patient's caregiver wants A1c order sent to [facility] in [place], patient has appointment for lab at 1:30 today, Friday, 4/17/2020    Phone number Patient can be reached at:  Home number on file [phone] (home)    Best Time:  Any    Can we leave a detailed message on this number?  YES    Call taken on 4/17/2020 at 12:18 PM by Rebecca Lang

## 2020-04-17 NOTE — TELEPHONE ENCOUNTER
Patient's caregiver wants A1c order sent to [facility] in [place], patient has appointment for lab at 1:30 today, Friday, 4/17/2020  Her caregiver reports she had discussed this option with provider as long as provider gets the results.    Please advise    Routing to provider.    Iris KUNZ Rn

## 2020-04-17 NOTE — TELEPHONE ENCOUNTER
A1C order signed, please fax to [facility] lab. Thank you!    VERONIQUE Spears CNP

## 2020-04-20 DIAGNOSIS — E11.22 TYPE 2 DIABETES MELLITUS WITH ESRD (END-STAGE RENAL DISEASE) (H): Primary | ICD-10-CM

## 2020-04-20 DIAGNOSIS — Z79.4 ENCOUNTER FOR LONG-TERM (CURRENT) USE OF INSULIN (H): ICD-10-CM

## 2020-04-20 DIAGNOSIS — N18.6 TYPE 2 DIABETES MELLITUS WITH ESRD (END-STAGE RENAL DISEASE) (H): Primary | ICD-10-CM

## 2020-04-20 DIAGNOSIS — N18.30 CHRONIC KIDNEY DISEASE, STAGE III (MODERATE) (H): ICD-10-CM

## 2020-04-20 NOTE — TELEPHONE ENCOUNTER
Informed [facility] lab is closed, gave lab order to [facility]. Rebecca Lang on 4/20/2020 at 9:35 AM

## 2020-04-27 DIAGNOSIS — J30.2 CHRONIC SEASONAL ALLERGIC RHINITIS: ICD-10-CM

## 2020-04-27 NOTE — TELEPHONE ENCOUNTER
"Requested Prescriptions   Pending Prescriptions Disp Refills     loratadine (CLARITIN) 10 MG tablet [Pharmacy Med Name: loratadine 10 mg tablet] 30 tablet 5     Sig: Take 1 tablet (10 mg) by mouth every morning       Antihistamines Protocol Passed - 4/27/2020  8:00 AM        Passed - Patient is 3-64 years of age     Apply weight-based dosing for peds patients age 3 - 12 years of age.    Forward request to provider for patients under the age of 3 or over the age of 64.          Passed - Recent (12 mo) or future (30 days) visit within the authorizing provider's specialty     Patient has had an office visit with the authorizing provider or a provider within the authorizing providers department within the previous 12 mos or has a future within next 30 days. See \"Patient Info\" tab in inbasket, or \"Choose Columns\" in Meds & Orders section of the refill encounter.              Passed - Medication is active on med list           Last Written Prescription Date:  11/12/2019  Last Fill Quantity: 90,  # refills: 1   Last office visit: 1/20/2020 with prescribing provider:  Eva   Future Office Visit:      "

## 2020-04-28 DIAGNOSIS — E78.5 HYPERLIPIDEMIA LDL GOAL <100: ICD-10-CM

## 2020-04-28 RX ORDER — ATORVASTATIN CALCIUM 10 MG/1
10 TABLET, FILM COATED ORAL DAILY
Qty: 90 TABLET | Refills: 1 | Status: SHIPPED | OUTPATIENT
Start: 2020-04-28 | End: 2020-07-24

## 2020-04-28 NOTE — TELEPHONE ENCOUNTER
Tobacco Treatment Program at the [facility] attempted to reach Ms. Delgadillo on 4/28/2020 regarding the tobacco cessation program to help Ms. Delgadillo to quit smoking. We will attempt to reach Ms. Delgadillo another time.     [facility]helley Campos [facility]  Tobacco Treatment Specialist  PH: [phone]

## 2020-04-28 NOTE — TELEPHONE ENCOUNTER
"Requested Prescriptions   Pending Prescriptions Disp Refills     atorvastatin (LIPITOR) 10 MG tablet 90 tablet 0     Sig: Take 1 tablet (10 mg) by mouth daily       Statins Protocol Passed - 4/28/2020 12:38 PM        Passed - LDL on file in past 12 months     Recent Labs   Lab Test 01/02/20  0945   LDL 78             Passed - No abnormal creatine kinase in past 12 months     Recent Labs   Lab Test 03/22/19  1735                   Passed - Recent (12 mo) or future (30 days) visit within the authorizing provider's specialty     Patient has had an office visit with the authorizing provider or a provider within the authorizing providers department within the previous 12 mos or has a future within next 30 days. See \"Patient Info\" tab in inbasket, or \"Choose Columns\" in Meds & Orders section of the refill encounter.              Passed - Medication is active on med list        Passed - Patient is age 18 or older        Passed - No active pregnancy on record        Passed - No positive pregnancy test in past 12 months           Prescription approved per [facility] Refill Protocol.    "

## 2020-04-28 NOTE — TELEPHONE ENCOUNTER
Patient is requesting a 90-day supply of atorvastatin (LIPITOR) 10 MG tablet  Per insurance recommendations. 90-day supply of atorvastatin (LIPITOR) 10 MG tablet will cost the same amount for the patient as 30 day supply. Please advise.

## 2020-04-29 RX ORDER — LORATADINE 10 MG/1
TABLET ORAL
Qty: 30 TABLET | Refills: 5 | Status: SHIPPED | OUTPATIENT
Start: 2020-04-29 | End: 2020-11-02

## 2020-05-06 ENCOUNTER — TELEPHONE (OUTPATIENT)
Dept: FAMILY MEDICINE | Facility: CLINIC | Age: 35
End: 2020-05-06

## 2020-05-06 NOTE — TELEPHONE ENCOUNTER
To Jaleesa Velasquez: This drug (ranitidine (ZANTAC) 300 MG tablet) is recalled. Substitute?? Thanks

## 2020-05-06 NOTE — TELEPHONE ENCOUNTER
The patient was switched to Pepcid long time ago, Zantac is not on med list since 09/2019.    VERONIQUE Spears CNP

## 2020-05-18 DIAGNOSIS — Z79.4 TYPE 2 DIABETES MELLITUS WITH STAGE 3 CHRONIC KIDNEY DISEASE, WITH LONG-TERM CURRENT USE OF INSULIN (H): ICD-10-CM

## 2020-05-18 DIAGNOSIS — N18.30 TYPE 2 DIABETES MELLITUS WITH STAGE 3 CHRONIC KIDNEY DISEASE, WITH LONG-TERM CURRENT USE OF INSULIN (H): ICD-10-CM

## 2020-05-18 DIAGNOSIS — E11.22 TYPE 2 DIABETES MELLITUS WITH STAGE 3 CHRONIC KIDNEY DISEASE, WITH LONG-TERM CURRENT USE OF INSULIN (H): ICD-10-CM

## 2020-05-18 LAB — HBA1C MFR BLD: 7.8 % (ref 0–5.6)

## 2020-05-18 PROCEDURE — 83036 HEMOGLOBIN GLYCOSYLATED A1C: CPT | Performed by: NURSE PRACTITIONER

## 2020-05-18 PROCEDURE — 36415 COLL VENOUS BLD VENIPUNCTURE: CPT | Performed by: NURSE PRACTITIONER

## 2020-05-21 ENCOUNTER — OFFICE VISIT (OUTPATIENT)
Dept: FAMILY MEDICINE | Facility: CLINIC | Age: 35
End: 2020-05-21
Payer: MEDICARE

## 2020-05-21 VITALS
WEIGHT: 177.4 LBS | OXYGEN SATURATION: 98 % | SYSTOLIC BLOOD PRESSURE: 122 MMHG | DIASTOLIC BLOOD PRESSURE: 60 MMHG | BODY MASS INDEX: 30.29 KG/M2 | TEMPERATURE: 98.8 F | RESPIRATION RATE: 16 BRPM | HEIGHT: 64 IN | HEART RATE: 89 BPM

## 2020-05-21 DIAGNOSIS — N18.30 TYPE 2 DIABETES MELLITUS WITH STAGE 3 CHRONIC KIDNEY DISEASE, WITH LONG-TERM CURRENT USE OF INSULIN (H): ICD-10-CM

## 2020-05-21 DIAGNOSIS — E11.22 TYPE 2 DIABETES MELLITUS WITH STAGE 3 CHRONIC KIDNEY DISEASE, WITH LONG-TERM CURRENT USE OF INSULIN (H): ICD-10-CM

## 2020-05-21 DIAGNOSIS — J45.21 MILD INTERMITTENT ASTHMA WITH ACUTE EXACERBATION: ICD-10-CM

## 2020-05-21 DIAGNOSIS — Z79.4 TYPE 2 DIABETES MELLITUS WITH STAGE 3 CHRONIC KIDNEY DISEASE, WITH LONG-TERM CURRENT USE OF INSULIN (H): ICD-10-CM

## 2020-05-21 DIAGNOSIS — E66.01 MORBID OBESITY (H): ICD-10-CM

## 2020-05-21 DIAGNOSIS — N18.30 CKD (CHRONIC KIDNEY DISEASE) STAGE 3, GFR 30-59 ML/MIN (H): ICD-10-CM

## 2020-05-21 DIAGNOSIS — K02.9 DENTAL CARIES: ICD-10-CM

## 2020-05-21 DIAGNOSIS — I10 ESSENTIAL HYPERTENSION: ICD-10-CM

## 2020-05-21 DIAGNOSIS — Z01.818 PREOP GENERAL PHYSICAL EXAM: Primary | ICD-10-CM

## 2020-05-21 LAB
ANION GAP SERPL CALCULATED.3IONS-SCNC: 4 MMOL/L (ref 3–14)
BUN SERPL-MCNC: 31 MG/DL (ref 7–30)
CALCIUM SERPL-MCNC: 9.2 MG/DL (ref 8.5–10.1)
CHLORIDE SERPL-SCNC: 109 MMOL/L (ref 94–109)
CO2 SERPL-SCNC: 24 MMOL/L (ref 20–32)
CREAT SERPL-MCNC: 1.48 MG/DL (ref 0.52–1.04)
GFR SERPL CREATININE-BSD FRML MDRD: 46 ML/MIN/{1.73_M2}
GLUCOSE SERPL-MCNC: 143 MG/DL (ref 70–99)
POTASSIUM SERPL-SCNC: 5.1 MMOL/L (ref 3.4–5.3)
SODIUM SERPL-SCNC: 137 MMOL/L (ref 133–144)

## 2020-05-21 PROCEDURE — 80048 BASIC METABOLIC PNL TOTAL CA: CPT | Performed by: NURSE PRACTITIONER

## 2020-05-21 PROCEDURE — 99214 OFFICE O/P EST MOD 30 MIN: CPT | Performed by: NURSE PRACTITIONER

## 2020-05-21 PROCEDURE — 36415 COLL VENOUS BLD VENIPUNCTURE: CPT | Performed by: NURSE PRACTITIONER

## 2020-05-21 RX ORDER — LISINOPRIL 5 MG/1
5 TABLET ORAL DAILY
Qty: 30 TABLET | Refills: 3 | Status: SHIPPED | OUTPATIENT
Start: 2020-05-21 | End: 2020-06-12

## 2020-05-21 ASSESSMENT — ASTHMA QUESTIONNAIRES
ACUTE_EXACERBATION_TODAY: NO
QUESTION_1 LAST FOUR WEEKS HOW MUCH OF THE TIME DID YOUR ASTHMA KEEP YOU FROM GETTING AS MUCH DONE AT WORK, SCHOOL OR AT HOME: NONE OF THE TIME
QUESTION_5 LAST FOUR WEEKS HOW WOULD YOU RATE YOUR ASTHMA CONTROL: WELL CONTROLLED
QUESTION_3 LAST FOUR WEEKS HOW OFTEN DID YOUR ASTHMA SYMPTOMS (WHEEZING, COUGHING, SHORTNESS OF BREATH, CHEST TIGHTNESS OR PAIN) WAKE YOU UP AT NIGHT OR EARLIER THAN USUAL IN THE MORNING: NOT AT ALL
QUESTION_4 LAST FOUR WEEKS HOW OFTEN HAVE YOU USED YOUR RESCUE INHALER OR NEBULIZER MEDICATION (SUCH AS ALBUTEROL): ONCE A WEEK OR LESS
ACT_TOTALSCORE: 22
QUESTION_2 LAST FOUR WEEKS HOW OFTEN HAVE YOU HAD SHORTNESS OF BREATH: ONCE OR TWICE A WEEK

## 2020-05-21 ASSESSMENT — MIFFLIN-ST. JEOR: SCORE: 1489.68

## 2020-05-21 NOTE — PROGRESS NOTES
[facility]  [address] [facility] [address]  [phone]  Dept: [phone]    PRE-OP EVALUATION:  Today's date: 2020    Divina Delgadillo (: 1985) presents for pre-operative evaluation assessment as requested by Dr. Spangler  She requires evaluation and anesthesia risk assessment prior to undergoing surgery/procedure for treatment of getting 8 teeth pulled  .    Proposed Surgery/ Procedure: tooth pulled   Date of Surgery/ Procedure: 20   Time of Surgery/ Procedure: 8:30 AM   Hospital/Surgical Facility: [facility]   Fax number for surgical facility: [phone]   Primary Physician: Jaleesa Velasquez  Type of Anesthesia Anticipated: to be determined    Patient has a Health Care Directive or Living Will:  NO    1. NO - Do you have a history of heart attack, stroke, stent, bypass or surgery on an artery in the head, neck, heart or legs?  2. NO - Do you ever have any pain or discomfort in your chest?  3. NO - Do you have a history of  Heart Failure?  4. NO - Are you troubled by shortness of breath when: walking on the level, up a slight hill or at night?  5. NO - Do you currently have a cold, bronchitis or other respiratory infection?  6. NO - Do you have a cough, shortness of breath or wheezing?  7. NO - Do you sometimes get pains in the calves of your legs when you walk?  8. NO - Do you or anyone in your family have previous history of blood clots?  9. NO - Do you or does anyone in your family have a serious bleeding problem such as prolonged bleeding following surgeries or cuts?  10. NO - Have you ever had problems with anemia or been told to take iron pills?  11. NO - Have you had any abnormal blood loss such as black, tarry or bloody stools, or abnormal vaginal bleeding?  12. NO - Have you ever had a blood transfusion?  13. NO - Have you or any of your relatives ever had problems with anesthesia?  14. NO - Do you have sleep apnea, excessive  snoring or daytime drowsiness?  15. NO - Do you have any prosthetic heart valves?  16. NO - Do you have prosthetic joints?  17. NO - Is there any chance that you may be pregnant?    Diabetes Follow-up    How often are you checking your blood sugar? Three times daily  Blood sugar testing frequency justification:  Uncontrolled diabetes  What time of day are you checking your blood sugars (select all that apply)?  Before and after meals this morning was 122.   Have you had any blood sugars above 200?  Yes- has had some low 200's in the past couple of weeks, but this was due to poor diet choices   Have you had any blood sugars below 70?  No    What symptoms do you notice when your blood sugar is low?  Shaky, Dizzy and Weak    What concerns do you have today about your diabetes? None     Do you have any of these symptoms? (Select all that apply)  Numbness in feet    Hyperlipidemia Follow-Up      Are you regularly taking any medication or supplement to lower your cholesterol?   Yes- Lipitor     Are you having muscle aches or other side effects that you think could be caused by your cholesterol lowering medication?  No    Hypertension Follow-up      Do you check your blood pressure regularly outside of the clinic? Yes every morning     Are you following a low salt diet? No    Are your blood pressures ever more than 140 on the top number (systolic) OR more   than 90 on the bottom number (diastolic), for example 140/90? No    BP Readings from Last 2 Encounters:   05/21/20 122/60   02/03/20 (!) 152/77     Hemoglobin A1C (%)   Date Value   05/18/2020 7.8 (H)   02/17/2020 7.4 (H)     LDL Cholesterol Calculated (mg/dL)   Date Value   01/02/2020 78   07/12/2019 192 (H)                  HPI:     HPI related to upcoming procedure: scheduled for dental procedure, getting 8 teeth pulled out       See problem list for active medical problems.  Problems all longstanding and stable, except as noted/documented.  See ROS for pertinent  "symptoms related to these conditions.      MEDICAL HISTORY:     Patient Active Problem List    Diagnosis Date Noted     ARF (acute renal failure) (H) 03/23/2019     Priority: Medium     Acute-on-chronic kidney injury (H) 03/22/2019     Priority: Medium     Near syncope 03/22/2019     Priority: Medium     Leukocytosis 03/22/2019     Priority: Medium     Acquired asplenia 03/22/2019     Priority: Medium     Acute pain of left knee 03/22/2019     Priority: Medium     H/O leukocytosis 11/27/2018     Priority: Medium     Due to spleen removal       Nicotine abuse 08/13/2018     Priority: Medium     Mild intermittent asthma with acute exacerbation 01/16/2018     Priority: Medium     Morbid obesity (H) 01/09/2018     Priority: Medium     IUD (intrauterine device) in place 07/26/2017     Priority: Medium     Mirena IUD inserted in office with premed 7/26/2017         Cervical high risk HPV (human papillomavirus) test positive 06/20/2017     Priority: Medium     6/15/2017 Pap:NIL, +HR HPV \"other\" (not 16/18). Plan to repeat Pap+HPV in 1 year  6/14/2018 Pap: NIL/neg HPV. Plan Pap+HPV in 3 years (2021)       DVT (deep venous thrombosis) (H) 02/27/2017     Priority: Medium     CKD (chronic kidney disease) stage 3, GFR 30-59 ml/min (H) 02/27/2017     Priority: Medium     Malignant neoplasm of pancreas, unspecified location of malignancy (H) 02/27/2017     Priority: Medium     Type 2 diabetes mellitus with stage 3 chronic kidney disease, with long-term current use of insulin (H) 02/27/2017     Priority: Medium     Bipolar disorder (H) 02/27/2017     Priority: Medium     Hyperlipidemia LDL goal <100 10/31/2010     Priority: Medium     Essential hypertension 06/13/2008     Priority: Medium     NAFL (nonalcoholic fatty liver) 04/11/2006     Priority: Medium     See flowsheet for hepatic panel.   Stopped zocor, was on godon as well had right upper quandrant ultrasound and ct  ? Psych med related vs [facility]       Esophageal reflux " 04/14/2005     Priority: Medium     GERD        Past Medical History:   Diagnosis Date     Cervical high risk HPV (human papillomavirus) test positive 6/20/2017     Depressive disorder, not elsewhere classified      DVT (deep venous thrombosis) (H)      Dysmetabolic syndrome X      Esophageal reflux     GERD     Mild intermittent asthma      Other abnormal heart sounds     Heart murmur     Other specified types of schizophrenia, unspecified condition      Pancreatic cancer (H)     left adrenal gland, partial pancreas, and spleen removed; no chemo or radiation.      Type II or unspecified type diabetes mellitus without mention of complication, not stated as uncontrolled      Unspecified disorder of tympanic membrane      Past Surgical History:   Procedure Laterality Date     ADRENALECTOMY       IR LIVER BIOPSY PERCUTANEOUS  11/14/2019     PANCREATECTOMY PARTIAL       PERCUTANEOUS BIOPSY LIVER Right 11/14/2019    Procedure: Percutaneous Liver Biopsy;  Surgeon: Sean Guzman PA-C;  Location: [facility] OR     SPLENECTOMY       SURGICAL HISTORY OF -   10/1/2000    Tympanoplasty     SURGICAL HISTORY OF -   10/1/2000    Tonsillectomy     Current Outpatient Medications   Medication Sig Dispense Refill     amLODIPine (NORVASC) 5 MG tablet Take 2 tablets (10 mg) by mouth daily 180 tablet 3     ARIPiprazole (ABILIFY) 30 MG tablet Take 30 mg by mouth daily       atorvastatin (LIPITOR) 10 MG tablet Take 1 tablet (10 mg) by mouth daily 90 tablet 1     blood glucose (ACCU-CHEK GUIDE) test strip Use to test blood sugar 3 times daily (accu chek GUIDE). 150 strip 11     blood glucose monitoring (ACCU-CHEK FASTCLIX) lancets Use to test blood sugar 3 times daily 102 each 11     carvedilol (COREG) 6.25 MG tablet TAKE 2 TABLETS BY MOUTH EVERY MORNING AND TAKE 1 TABLET EVERY EVENING 90 tablet 3     Clozapine (CLOZARIL) 200 MG tablet Take 200 mg by mouth 2 times daily        FLUoxetine (PROZAC) 20 MG capsule Take 20 mg by mouth daily        insulin glargine (LANTUS SOLOSTAR) 100 UNIT/ML pen Inject 19 units once daily, increase by 2 units every 3-4 days untill BG ranging around 140-150', max dose 25 units/day for titration. 15 mL 2     lamoTRIgine (LAMICTAL) 25 MG tablet 100 mg at bedtime  0     lisinopril (ZESTRIL) 5 MG tablet Take 1 tablet (5 mg) by mouth daily 30 tablet 3     loratadine (CLARITIN) 10 MG tablet Take 1 tablet (10 mg) by mouth every morning 30 tablet 5     montelukast (SINGULAIR) 10 MG tablet Take 1 tablet (10 mg) by mouth At Bedtime 30 tablet 5     omeprazole (PRILOSEC) 20 MG DR capsule Take 1 capsule (20 mg) by mouth daily 60 capsule 5     semaglutide (OZEMPIC) 1 MG/DOSE pen Inject 1 mg Subcutaneous every 7 days 9 mL 3     famotidine (PEPCID) 40 MG tablet Take 1 tablet (40 mg) by mouth nightly as needed for heartburn (Patient not taking: Reported on 2020) 90 tablet 3     nicotine (NICOTROL) 10 MG inhaler Inhale 6-10 Cartridges into the lungs daily as needed for smoking cessation (Patient not taking: Reported on 2020) 168 each 0     ULTICARE MINI 31G X 6 MM insulin pen needle Use 1 pen needles daily (Patient not taking: Reported on 2/3/2020) 100 each 11     VENTOLIN  (90 Base) MCG/ACT inhaler inhale 2 puffs every 6 hours as needed for shortness of breath (Patient not taking: Reported on 2020) 18 g 3     OTC products: none    Allergies   Allergen Reactions     Acetaminophen Other (See Comments)     pt previously tried to overdose on it, PMD does not want pt taking per pt.      Latex Rash      Latex Allergy: NO    Social History     Tobacco Use     Smoking status: Current Every Day Smoker     Packs/day: 1.00     Years: 4.00     Pack years: 4.00     Types: Cigarettes     Last attempt to quit: 2019     Years since quittin.9     Smokeless tobacco: Never Used     Tobacco comment: 15 cigarettes a day    Substance Use Topics     Alcohol use: No     History   Drug Use No       REVIEW OF SYSTEMS:  "  Constitutional, HEENT, cardiovascular, pulmonary, gi and gu systems are negative, except as otherwise noted.    EXAM:   /60 (BP Location: Right arm, Patient Position: Sitting, Cuff Size: Adult Regular)   Pulse 89   Temp 98.8  F (37.1  C) (Tympanic)   Resp 16   Ht 1.626 m (5' 4\")   Wt 80.5 kg (177 lb 6.4 oz)   SpO2 98%   BMI 30.45 kg/m      GENERAL APPEARANCE: healthy, alert and no distress     EYES: EOMI, PERRL     HENT: ear canals and TM's normal and nose and mouth without ulcers or lesions     NECK: no adenopathy, no asymmetry, masses, or scars and thyroid normal to palpation     RESP: lungs clear to auscultation - no rales, rhonchi or wheezes     CV: regular rates and rhythm, normal S1 S2, no S3 or S4 and no murmur, click or rub     MS: extremities normal- no gross deformities noted, no evidence of inflammation in joints, FROM in all extremities.     SKIN: no suspicious lesions or rashes     NEURO: Normal strength and tone, sensory exam grossly normal, mentation intact and speech normal     PSYCH: mentation appears normal. and affect normal/bright     LYMPHATICS: No cervical adenopathy    DIAGNOSTICS:     EKG: Not indicated due to non-vascular surgery and low risk of event (age <65 and without cardiac risk factors)  Labs Resulted Today:   Results for orders placed or performed in visit on 05/21/20   Basic metabolic panel  (Ca, Cl, CO2, Creat, Gluc, K, Na, BUN)     Status: Abnormal   Result Value Ref Range    Sodium 137 133 - 144 mmol/L    Potassium 5.1 3.4 - 5.3 mmol/L    Chloride 109 94 - 109 mmol/L    Carbon Dioxide 24 20 - 32 mmol/L    Anion Gap 4 3 - 14 mmol/L    Glucose 143 (H) 70 - 99 mg/dL    Urea Nitrogen 31 (H) 7 - 30 mg/dL    Creatinine 1.48 (H) 0.52 - 1.04 mg/dL    GFR Estimate 46 (L) >60 mL/min/[1.73_m2]    GFR Estimate If Black 53 (L) >60 mL/min/[1.73_m2]    Calcium 9.2 8.5 - 10.1 mg/dL     Labs Drawn and in Process:   Unresulted Labs Ordered in the Past 30 Days of this Admission     " No orders found from 4/21/2020 to 5/22/2020.          Recent Labs   Lab Test 05/18/20  1300 02/17/20  1440 02/03/20  1014  11/13/19  1547   HGB  --  12.3 13.3   < >  --    PLT  --  398 417   < >  --    INR  --   --  0.97  --  0.93   NA  --  138 140  --  141   POTASSIUM  --  4.5 4.8  --  4.7   CR  --  1.83* 1.37*  --  1.56*   A1C 7.8* 7.4*  --    < >  --     < > = values in this interval not displayed.        IMPRESSION:   Reason for surgery/procedure: dental caries   Diagnosis/reason for consult: preop evaluation     The proposed surgical procedure is considered LOW risk.    REVISED CARDIAC RISK INDEX  The patient has the following serious cardiovascular risks for perioperative complications such as (MI, PE, VFib and 3  AV Block):  No serious cardiac risks  INTERPRETATION: 0 risks: Class I (very low risk - 0.4% complication rate)    The patient has the following additional risks for perioperative complications:  No identified additional risks      ICD-10-CM    1. Preop general physical exam  Z01.818 Basic metabolic panel  (Ca, Cl, CO2, Creat, Gluc, K, Na, BUN)   2. Mild intermittent asthma with acute exacerbation  J45.21    3. Morbid obesity (H)  E66.01    4. Type 2 diabetes mellitus with stage 3 chronic kidney disease, with long-term current use of insulin (H)  E11.22 Basic metabolic panel  (Ca, Cl, CO2, Creat, Gluc, K, Na, BUN)    N18.3 lisinopril (ZESTRIL) 5 MG tablet    Z79.4    5. CKD (chronic kidney disease) stage 3, GFR 30-59 ml/min (H)  N18.3 Basic metabolic panel  (Ca, Cl, CO2, Creat, Gluc, K, Na, BUN)   6. Essential hypertension  I10 lisinopril (ZESTRIL) 5 MG tablet       RECOMMENDATIONS:     --Patient is to take all scheduled medications on the day of surgery EXCEPT for modifications listed below.    Diabetes Medication Use    -----Hold usual oral and non-insulin diabetic meds (e.g. Metformin, Actos, Glipizide) while NPO.   -----Take 80% of long acting insulin (e.g. Lantus, NPH) while NPO  (fasting)      APPROVAL GIVEN to proceed with proposed procedure, without further diagnostic evaluation       Signed Electronically by: VERONIQUE Spears CNP    Copy of this evaluation report is provided to requesting physician.    [facility] Preop Guidelines    Revised Cardiac Risk Index

## 2020-05-22 ASSESSMENT — ASTHMA QUESTIONNAIRES: ACT_TOTALSCORE: 22

## 2020-05-25 ENCOUNTER — HOSPITAL ENCOUNTER (EMERGENCY)
Facility: CLINIC | Age: 35
Discharge: OTHER INSTITUTION | End: 2020-05-26
Attending: EMERGENCY MEDICINE | Admitting: EMERGENCY MEDICINE
Payer: MEDICARE

## 2020-05-25 ENCOUNTER — TELEPHONE (OUTPATIENT)
Dept: BEHAVIORAL HEALTH | Facility: CLINIC | Age: 35
End: 2020-05-25

## 2020-05-25 ENCOUNTER — APPOINTMENT (OUTPATIENT)
Dept: GENERAL RADIOLOGY | Facility: CLINIC | Age: 35
End: 2020-05-25
Attending: EMERGENCY MEDICINE
Payer: MEDICARE

## 2020-05-25 DIAGNOSIS — N18.9 ACUTE KIDNEY INJURY SUPERIMPOSED ON CKD (H): ICD-10-CM

## 2020-05-25 DIAGNOSIS — R44.0 AUDITORY HALLUCINATIONS: ICD-10-CM

## 2020-05-25 DIAGNOSIS — N17.9 ACUTE KIDNEY INJURY SUPERIMPOSED ON CKD (H): ICD-10-CM

## 2020-05-25 DIAGNOSIS — E87.5 HYPERKALEMIA: ICD-10-CM

## 2020-05-25 DIAGNOSIS — T50.902A INTENTIONAL DRUG OVERDOSE, INITIAL ENCOUNTER (H): ICD-10-CM

## 2020-05-25 LAB
ALBUMIN SERPL-MCNC: 3.2 G/DL (ref 3.4–5)
ALP SERPL-CCNC: 209 U/L (ref 40–150)
ALT SERPL W P-5'-P-CCNC: 41 U/L (ref 0–50)
AMPHETAMINES UR QL SCN: NEGATIVE
ANION GAP SERPL CALCULATED.3IONS-SCNC: 5 MMOL/L (ref 3–14)
ANION GAP SERPL CALCULATED.3IONS-SCNC: 7 MMOL/L (ref 3–14)
APAP SERPL-MCNC: 21 MG/L (ref 10–20)
APAP SERPL-MCNC: <2 MG/L (ref 10–20)
AST SERPL W P-5'-P-CCNC: 22 U/L (ref 0–45)
BARBITURATES UR QL: NEGATIVE
BASE DEFICIT BLDV-SCNC: 2.6 MMOL/L
BASOPHILS # BLD AUTO: 0.2 10E9/L (ref 0–0.2)
BASOPHILS NFR BLD AUTO: 1 %
BENZODIAZ UR QL: NEGATIVE
BILIRUB SERPL-MCNC: 0.3 MG/DL (ref 0.2–1.3)
BUN SERPL-MCNC: 25 MG/DL (ref 7–30)
BUN SERPL-MCNC: 26 MG/DL (ref 7–30)
CALCIUM SERPL-MCNC: 8.3 MG/DL (ref 8.5–10.1)
CALCIUM SERPL-MCNC: 9.1 MG/DL (ref 8.5–10.1)
CANNABINOIDS UR QL SCN: NEGATIVE
CHLORIDE SERPL-SCNC: 109 MMOL/L (ref 94–109)
CHLORIDE SERPL-SCNC: 110 MMOL/L (ref 94–109)
CO2 SERPL-SCNC: 21 MMOL/L (ref 20–32)
CO2 SERPL-SCNC: 26 MMOL/L (ref 20–32)
COCAINE UR QL: NEGATIVE
CREAT SERPL-MCNC: 1.43 MG/DL (ref 0.52–1.04)
CREAT SERPL-MCNC: 1.63 MG/DL (ref 0.52–1.04)
DIFFERENTIAL METHOD BLD: ABNORMAL
EOSINOPHIL # BLD AUTO: 0.2 10E9/L (ref 0–0.7)
EOSINOPHIL NFR BLD AUTO: 0.8 %
ERYTHROCYTE [DISTWIDTH] IN BLOOD BY AUTOMATED COUNT: 13.2 % (ref 10–15)
GFR SERPL CREATININE-BSD FRML MDRD: 41 ML/MIN/{1.73_M2}
GFR SERPL CREATININE-BSD FRML MDRD: 48 ML/MIN/{1.73_M2}
GLUCOSE SERPL-MCNC: 228 MG/DL (ref 70–99)
GLUCOSE SERPL-MCNC: 247 MG/DL (ref 70–99)
HCG SERPL QL: NEGATIVE
HCO3 BLDV-SCNC: 23 MMOL/L (ref 21–28)
HCT VFR BLD AUTO: 38.3 % (ref 35–47)
HGB BLD-MCNC: 12.2 G/DL (ref 11.7–15.7)
IMM GRANULOCYTES # BLD: 0.2 10E9/L (ref 0–0.4)
IMM GRANULOCYTES NFR BLD: 1.1 %
LYMPHOCYTES # BLD AUTO: 3.5 10E9/L (ref 0.8–5.3)
LYMPHOCYTES NFR BLD AUTO: 16.3 %
MCH RBC QN AUTO: 30.8 PG (ref 26.5–33)
MCHC RBC AUTO-ENTMCNC: 31.9 G/DL (ref 31.5–36.5)
MCV RBC AUTO: 97 FL (ref 78–100)
MONOCYTES # BLD AUTO: 1.2 10E9/L (ref 0–1.3)
MONOCYTES NFR BLD AUTO: 5.3 %
NEUTROPHILS # BLD AUTO: 16.4 10E9/L (ref 1.6–8.3)
NEUTROPHILS NFR BLD AUTO: 75.5 %
NRBC # BLD AUTO: 0 10*3/UL
NRBC BLD AUTO-RTO: 0 /100
O2/TOTAL GAS SETTING VFR VENT: NORMAL %
OPIATES UR QL SCN: POSITIVE
PCO2 BLDV: 41 MM HG (ref 40–50)
PCP UR QL SCN: NEGATIVE
PH BLDV: 7.35 PH (ref 7.32–7.43)
PLATELET # BLD AUTO: 357 10E9/L (ref 150–450)
PO2 BLDV: 40 MM HG (ref 25–47)
POTASSIUM SERPL-SCNC: 4.3 MMOL/L (ref 3.4–5.3)
POTASSIUM SERPL-SCNC: 5.9 MMOL/L (ref 3.4–5.3)
POTASSIUM SERPL-SCNC: 6.1 MMOL/L (ref 3.4–5.3)
PROT SERPL-MCNC: 7.5 G/DL (ref 6.8–8.8)
RBC # BLD AUTO: 3.96 10E12/L (ref 3.8–5.2)
SALICYLATES SERPL-MCNC: <2 MG/DL
SARS-COV-2 PCR COMMENT: NORMAL
SARS-COV-2 RNA SPEC QL NAA+PROBE: NEGATIVE
SARS-COV-2 RNA SPEC QL NAA+PROBE: NORMAL
SODIUM SERPL-SCNC: 137 MMOL/L (ref 133–144)
SODIUM SERPL-SCNC: 141 MMOL/L (ref 133–144)
SPECIMEN SOURCE: NORMAL
SPECIMEN SOURCE: NORMAL
WBC # BLD AUTO: 21.7 10E9/L (ref 4–11)

## 2020-05-25 PROCEDURE — 93010 ELECTROCARDIOGRAM REPORT: CPT | Mod: Z6 | Performed by: EMERGENCY MEDICINE

## 2020-05-25 PROCEDURE — U0003 INFECTIOUS AGENT DETECTION BY NUCLEIC ACID (DNA OR RNA); SEVERE ACUTE RESPIRATORY SYNDROME CORONAVIRUS 2 (SARS-COV-2) (CORONAVIRUS DISEASE [COVID-19]), AMPLIFIED PROBE TECHNIQUE, MAKING USE OF HIGH THROUGHPUT TECHNOLOGIES AS DESCRIBED BY CMS-2020-01-R: HCPCS | Performed by: EMERGENCY MEDICINE

## 2020-05-25 PROCEDURE — 25000128 H RX IP 250 OP 636: Performed by: EMERGENCY MEDICINE

## 2020-05-25 PROCEDURE — 80053 COMPREHEN METABOLIC PANEL: CPT | Performed by: EMERGENCY MEDICINE

## 2020-05-25 PROCEDURE — 80048 BASIC METABOLIC PNL TOTAL CA: CPT | Performed by: EMERGENCY MEDICINE

## 2020-05-25 PROCEDURE — 90791 PSYCH DIAGNOSTIC EVALUATION: CPT

## 2020-05-25 PROCEDURE — 25800025 ZZH RX 258: Performed by: EMERGENCY MEDICINE

## 2020-05-25 PROCEDURE — 84132 ASSAY OF SERUM POTASSIUM: CPT | Performed by: EMERGENCY MEDICINE

## 2020-05-25 PROCEDURE — 80329 ANALGESICS NON-OPIOID 1 OR 2: CPT | Performed by: EMERGENCY MEDICINE

## 2020-05-25 PROCEDURE — 82803 BLOOD GASES ANY COMBINATION: CPT | Performed by: EMERGENCY MEDICINE

## 2020-05-25 PROCEDURE — 99291 CRITICAL CARE FIRST HOUR: CPT | Mod: 25 | Performed by: EMERGENCY MEDICINE

## 2020-05-25 PROCEDURE — 96365 THER/PROPH/DIAG IV INF INIT: CPT

## 2020-05-25 PROCEDURE — 80329 ANALGESICS NON-OPIOID 1 OR 2: CPT | Mod: 91 | Performed by: EMERGENCY MEDICINE

## 2020-05-25 PROCEDURE — 96361 HYDRATE IV INFUSION ADD-ON: CPT

## 2020-05-25 PROCEDURE — 25000125 ZZHC RX 250

## 2020-05-25 PROCEDURE — 93005 ELECTROCARDIOGRAM TRACING: CPT

## 2020-05-25 PROCEDURE — 80307 DRUG TEST PRSMV CHEM ANLYZR: CPT | Performed by: FAMILY MEDICINE

## 2020-05-25 PROCEDURE — 96375 TX/PRO/DX INJ NEW DRUG ADDON: CPT

## 2020-05-25 PROCEDURE — 85025 COMPLETE CBC W/AUTO DIFF WBC: CPT | Performed by: EMERGENCY MEDICINE

## 2020-05-25 PROCEDURE — 25000125 ZZHC RX 250: Performed by: EMERGENCY MEDICINE

## 2020-05-25 PROCEDURE — 84703 CHORIONIC GONADOTROPIN ASSAY: CPT | Performed by: EMERGENCY MEDICINE

## 2020-05-25 PROCEDURE — 99292 CRITICAL CARE ADDL 30 MIN: CPT

## 2020-05-25 PROCEDURE — 25000131 ZZH RX MED GY IP 250 OP 636 PS 637: Mod: GY | Performed by: EMERGENCY MEDICINE

## 2020-05-25 PROCEDURE — 40000986 XR CHEST PORT 1 VW

## 2020-05-25 PROCEDURE — 96366 THER/PROPH/DIAG IV INF ADDON: CPT

## 2020-05-25 PROCEDURE — 25800030 ZZH RX IP 258 OP 636: Performed by: EMERGENCY MEDICINE

## 2020-05-25 PROCEDURE — 99291 CRITICAL CARE FIRST HOUR: CPT | Mod: 25

## 2020-05-25 RX ORDER — SODIUM CHLORIDE 9 MG/ML
1000 INJECTION, SOLUTION INTRAVENOUS CONTINUOUS
Status: DISCONTINUED | OUTPATIENT
Start: 2020-05-25 | End: 2020-05-26 | Stop reason: HOSPADM

## 2020-05-25 RX ORDER — DEXTROSE MONOHYDRATE 100 MG/ML
INJECTION, SOLUTION INTRAVENOUS CONTINUOUS
Status: DISCONTINUED | OUTPATIENT
Start: 2020-05-25 | End: 2020-05-26 | Stop reason: HOSPADM

## 2020-05-25 RX ORDER — ALBUTEROL SULFATE 0.83 MG/ML
SOLUTION RESPIRATORY (INHALATION)
Status: COMPLETED
Start: 2020-05-25 | End: 2020-05-25

## 2020-05-25 RX ORDER — ONDANSETRON 2 MG/ML
4 INJECTION INTRAMUSCULAR; INTRAVENOUS EVERY 30 MIN PRN
Status: DISCONTINUED | OUTPATIENT
Start: 2020-05-25 | End: 2020-05-26 | Stop reason: HOSPADM

## 2020-05-25 RX ORDER — DEXTROSE MONOHYDRATE 25 G/50ML
25-50 INJECTION, SOLUTION INTRAVENOUS
Status: DISCONTINUED | OUTPATIENT
Start: 2020-05-25 | End: 2020-05-26 | Stop reason: HOSPADM

## 2020-05-25 RX ORDER — NALOXONE HYDROCHLORIDE 0.4 MG/ML
0.5 INJECTION, SOLUTION INTRAMUSCULAR; INTRAVENOUS; SUBCUTANEOUS
Status: DISCONTINUED | OUTPATIENT
Start: 2020-05-25 | End: 2020-05-26 | Stop reason: HOSPADM

## 2020-05-25 RX ORDER — DEXTROSE MONOHYDRATE 25 G/50ML
25 INJECTION, SOLUTION INTRAVENOUS ONCE
Status: COMPLETED | OUTPATIENT
Start: 2020-05-25 | End: 2020-05-25

## 2020-05-25 RX ORDER — CALCIUM GLUCONATE 94 MG/ML
1 INJECTION, SOLUTION INTRAVENOUS ONCE
Status: DISCONTINUED | OUTPATIENT
Start: 2020-05-25 | End: 2020-05-25 | Stop reason: DRUGHIGH

## 2020-05-25 RX ORDER — ALBUTEROL SULFATE 5 MG/ML
10 SOLUTION RESPIRATORY (INHALATION) ONCE
Status: COMPLETED | OUTPATIENT
Start: 2020-05-25 | End: 2020-05-25

## 2020-05-25 RX ORDER — NICOTINE POLACRILEX 4 MG
15-30 LOZENGE BUCCAL
Status: DISCONTINUED | OUTPATIENT
Start: 2020-05-25 | End: 2020-05-26 | Stop reason: HOSPADM

## 2020-05-25 RX ADMIN — ALBUTEROL SULFATE 2.5 MG: 2.5 SOLUTION RESPIRATORY (INHALATION) at 15:46

## 2020-05-25 RX ADMIN — ONDANSETRON 4 MG: 2 INJECTION INTRAMUSCULAR; INTRAVENOUS at 13:34

## 2020-05-25 RX ADMIN — DEXTROSE MONOHYDRATE 25 G: 500 INJECTION PARENTERAL at 15:43

## 2020-05-25 RX ADMIN — SODIUM CHLORIDE 1000 ML: 9 INJECTION, SOLUTION INTRAVENOUS at 13:30

## 2020-05-25 RX ADMIN — ALBUTEROL SULFATE 12.5 MG: 2.5 SOLUTION RESPIRATORY (INHALATION) at 15:45

## 2020-05-25 RX ADMIN — SODIUM CHLORIDE 1000 ML: 9 INJECTION, SOLUTION INTRAVENOUS at 14:13

## 2020-05-25 RX ADMIN — INSULIN HUMAN 8 UNITS: 100 INJECTION, SOLUTION PARENTERAL at 15:46

## 2020-05-25 RX ADMIN — SODIUM BICARBONATE 50 MEQ: 84 INJECTION, SOLUTION INTRAVENOUS at 15:42

## 2020-05-25 RX ADMIN — ALBUTEROL SULFATE 10 MG: 5 SOLUTION RESPIRATORY (INHALATION) at 15:48

## 2020-05-25 RX ADMIN — CALCIUM GLUCONATE 1 G: 98 INJECTION, SOLUTION INTRAVENOUS at 15:41

## 2020-05-25 RX ADMIN — DEXTROSE MONOHYDRATE: 100 INJECTION, SOLUTION INTRAVENOUS at 16:16

## 2020-05-25 ASSESSMENT — ENCOUNTER SYMPTOMS
MUSCULOSKELETAL NEGATIVE: 1
ALLERGIC/IMMUNOLOGIC NEGATIVE: 1
GASTROINTESTINAL NEGATIVE: 1
HEMATOLOGIC/LYMPHATIC NEGATIVE: 1
NEUROLOGICAL NEGATIVE: 1
EYES NEGATIVE: 1
CONSTITUTIONAL NEGATIVE: 1
ENDOCRINE NEGATIVE: 1
CARDIOVASCULAR NEGATIVE: 1
RESPIRATORY NEGATIVE: 1
HALLUCINATIONS: 1

## 2020-05-25 NOTE — ED NOTES
NG removed, pt tolerated well, dinner tray given however she just had dental work done and has no teeth so we made her some soup and pudding. Pt remains a/o x 5.

## 2020-05-25 NOTE — ED PROVIDER NOTES
"  History     Chief Complaint   Patient presents with     Drug Overdose     unknown amount of vicodin and 14oz of nyQuil      HPI  Divina Delgadillo is a 35 year old female who presents by EMS from a local gas station for concern for intentional drug ingestion and overdose.  History was limited directly from the patient initially on arrival.  EMS report patient drank at  least 354 mL bottle of NyQuil and was drinking a second bottle when an attendant at the gas station called 911.  EMS report they gave 2 mg of IV Narcan.  EMS reports she recently had dental work \"teeth pulled and may have gotten vicodin\".  Patient arrived for further assessment and care.  On arrival she was sleepy, GCS was 15 upon arival (after naloxone).  Patient reports she lives in [facility] with her foster mom - \"L[place] in [facility]\".  Patient reports taking Vicodin and NyQuil because she \"could not stop the voices in her head\".  Patient reports \"a scary man tells me to hurt myself\". She reports she has never been hospitalized for poisoning or drug overdose.  Medical records show a diagnosis of history of hypertension, stage III chronic kidney disease, type 2 diabetes,  bipolar disorder, esophageal reflux, morbid obesity.  Medical records show that patient is prescribed Norvasc, Lipitor, Coreg, famotidine, insulin, lisinopril, Abilify, Clozaril, Prozac and Lamictal.  Patient reports she took vicodin around 10am.    Allergies:  Allergies   Allergen Reactions     Acetaminophen Other (See Comments)     pt previously tried to overdose on it, PMD does not want pt taking per pt.      Latex Rash       Problem List:    Patient Active Problem List    Diagnosis Date Noted     ARF (acute renal failure) (H) 03/23/2019     Priority: Medium     Acute-on-chronic kidney injury (H) 03/22/2019     Priority: Medium     Near syncope 03/22/2019     Priority: Medium     Leukocytosis 03/22/2019     Priority: Medium     Acquired asplenia 03/22/2019     Priority: " "Medium     Acute pain of left knee 03/22/2019     Priority: Medium     H/O leukocytosis 11/27/2018     Priority: Medium     Due to spleen removal       Nicotine abuse 08/13/2018     Priority: Medium     Mild intermittent asthma with acute exacerbation 01/16/2018     Priority: Medium     Morbid obesity (H) 01/09/2018     Priority: Medium     IUD (intrauterine device) in place 07/26/2017     Priority: Medium     Mirena IUD inserted in office with premed 7/26/2017         Cervical high risk HPV (human papillomavirus) test positive 06/20/2017     Priority: Medium     6/15/2017 Pap:NIL, +HR HPV \"other\" (not 16/18). Plan to repeat Pap+HPV in 1 year  6/14/2018 Pap: NIL/neg HPV. Plan Pap+HPV in 3 years (2021)       DVT (deep venous thrombosis) (H) 02/27/2017     Priority: Medium     CKD (chronic kidney disease) stage 3, GFR 30-59 ml/min (H) 02/27/2017     Priority: Medium     Malignant neoplasm of pancreas, unspecified location of malignancy (H) 02/27/2017     Priority: Medium     Type 2 diabetes mellitus with stage 3 chronic kidney disease, with long-term current use of insulin (H) 02/27/2017     Priority: Medium     Bipolar disorder (H) 02/27/2017     Priority: Medium     Hyperlipidemia LDL goal <100 10/31/2010     Priority: Medium     Essential hypertension 06/13/2008     Priority: Medium     NAFL (nonalcoholic fatty liver) 04/11/2006     Priority: Medium     See flowsheet for hepatic panel.   Stopped zocor, was on godon as well had right upper quandrant ultrasound and ct  ? Psych med related vs SAHNI       Esophageal reflux 04/14/2005     Priority: Medium     GERD          Past Medical History:    Past Medical History:   Diagnosis Date     Cervical high risk HPV (human papillomavirus) test positive 6/20/2017     Depressive disorder, not elsewhere classified      DVT (deep venous thrombosis) (H)      Dysmetabolic syndrome X      Esophageal reflux      Mild intermittent asthma      Other abnormal heart sounds      Other " specified types of schizophrenia, unspecified condition      Pancreatic cancer (H)      Type II or unspecified type diabetes mellitus without mention of complication, not stated as uncontrolled      Unspecified disorder of tympanic membrane        Past Surgical History:    Past Surgical History:   Procedure Laterality Date     ADRENALECTOMY       IR LIVER BIOPSY PERCUTANEOUS  2019     PANCREATECTOMY PARTIAL       PERCUTANEOUS BIOPSY LIVER Right 2019    Procedure: Percutaneous Liver Biopsy;  Surgeon: Sean Guzman PA-C;  Location: [facility] OR     SPLENECTOMY       SURGICAL HISTORY OF -   10/1/2000    Tympanoplasty     SURGICAL HISTORY OF -   10/1/2000    Tonsillectomy       Family History:    Family History   Problem Relation Age of Onset     Respiratory Mother         ASTHMA     Allergies Mother      Depression Mother      Heart Disease Father         HEART VALVE REPLACEMENT     Hypertension Father      Diabetes Father      Depression Father      Respiratory Brother      Allergies Brother      Respiratory Sister      Allergies Sister      Depression Sister      Respiratory Sister      Allergies Sister      Depression Sister      Kidney Disease No family hx of        Social History:  Marital Status:  Single [1]  Social History     Tobacco Use     Smoking status: Current Every Day Smoker     Packs/day: 1.00     Years: 4.00     Pack years: 4.00     Types: Cigarettes     Last attempt to quit: 2019     Years since quittin.9     Smokeless tobacco: Never Used     Tobacco comment: 15 cigarettes a day    Substance Use Topics     Alcohol use: No     Drug use: No        Medications:    amLODIPine (NORVASC) 5 MG tablet  ARIPiprazole (ABILIFY) 30 MG tablet  atorvastatin (LIPITOR) 10 MG tablet  blood glucose (ACCU-CHEK GUIDE) test strip  blood glucose monitoring (ACCU-CHEK FASTCLIX) lancets  carvedilol (COREG) 6.25 MG tablet  Clozapine (CLOZARIL) 200 MG tablet  famotidine (PEPCID) 40 MG  tablet  FLUoxetine (PROZAC) 20 MG capsule  insulin glargine (LANTUS SOLOSTAR) 100 UNIT/ML pen  lamoTRIgine (LAMICTAL) 25 MG tablet  lisinopril (ZESTRIL) 5 MG tablet  loratadine (CLARITIN) 10 MG tablet  montelukast (SINGULAIR) 10 MG tablet  nicotine (NICOTROL) 10 MG inhaler  omeprazole (PRILOSEC) 20 MG DR capsule  semaglutide (OZEMPIC) 1 MG/DOSE pen  ULTICARE MINI 31G X 6 MM insulin pen needle  VENTOLIN  (90 Base) MCG/ACT inhaler          Review of Systems   Constitutional: Negative.    HENT: Negative.    Eyes: Negative.    Respiratory: Negative.    Cardiovascular: Negative.    Gastrointestinal: Negative.    Endocrine: Negative.    Genitourinary: Negative.    Musculoskeletal: Negative.    Skin: Negative.    Allergic/Immunologic: Negative.    Neurological: Negative.    Hematological: Negative.    Psychiatric/Behavioral: Positive for hallucinations (auditory).   All other systems reviewed and are negative.      Physical Exam   BP: 136/61  Pulse: 99  Heart Rate: 101  Temp: 97.7  F (36.5  C)  Resp: 16  SpO2: 97 %      Physical Exam  Constitutional:       Appearance: She is not diaphoretic.   HENT:      Head: Normocephalic and atraumatic.      Mouth/Throat:      Mouth: Mucous membranes are moist.   Eyes:      Extraocular Movements: Extraocular movements intact.      Pupils: Pupils are equal, round, and reactive to light.   Neck:      Musculoskeletal: Normal range of motion and neck supple. No neck rigidity or muscular tenderness.      Vascular: No carotid bruit.   Cardiovascular:      Rate and Rhythm: Normal rate and regular rhythm.      Pulses: Normal pulses.      Heart sounds: Normal heart sounds.   Pulmonary:      Effort: Pulmonary effort is normal. No respiratory distress.      Breath sounds: Normal breath sounds. No stridor. No wheezing, rhonchi or rales.   Chest:      Chest wall: No tenderness.   Musculoskeletal:         General: No swelling, tenderness, deformity or signs of injury.      Right lower leg: No  edema.      Left lower leg: No edema.   Lymphadenopathy:      Cervical: No cervical adenopathy.   Skin:     Capillary Refill: Capillary refill takes less than 2 seconds.      Coloration: Skin is not jaundiced or pale.      Findings: No bruising, erythema, lesion or rash.   Neurological:      General: No focal deficit present.      Mental Status: She is alert.   Psychiatric:         Attention and Perception: She perceives auditory hallucinations.         Thought Content: Thought content includes suicidal ideation. Thought content includes suicidal (drug overdose) plan.         ED Course        Procedures               EKG Interpretation:      Interpreted by All Choe MD  Time reviewed: 13:40  Symptoms at time of EKG: Drug overdose, altered level of consciousness.   Rhythm: normal sinus   Rate: Normal  Axis: Normal  Ectopy: none  Conduction: normal  ST Segments/ T Waves: Non-specific ST-T wave changes  Q Waves: nonspecific  Comparison to prior: Unchanged from 3/22/2019    Clinical Impression: no acute changes        Critical Care time: was 60 minutes for this patient excluding procedures.             ED medications:  Medications   0.9% sodium chloride BOLUS (0 mLs Intravenous Stopped 5/25/20 1413)     Followed by   sodium chloride 0.9% infusion (has no administration in time range)   ondansetron (ZOFRAN) injection 4 mg (4 mg Intravenous Given 5/25/20 1334)   0.9% sodium chloride BOLUS (has no administration in time range)   sodium chloride 0.9% infusion (1,000 mLs Intravenous New Bag 5/25/20 1413)   naloxone (NARCAN) injection 0.5 mg (has no administration in time range)   glucose gel 15-30 g (has no administration in time range)     Or   dextrose 50 % injection 25-50 mL (has no administration in time range)     Or   glucagon injection 1 mg (has no administration in time range)   sodium bicarbonate 8.4 % injection 50 mEq (has no administration in time range)   dextrose 10% infusion (has no administration  in time range)   dextrose 50 % injection 25 g (has no administration in time range)   insulin (regular) (HumuLIN R/NovoLIN R) injection 8 Units (has no administration in time range)   albuterol (PROVENTIL) neb solution 10 mg (has no administration in time range)   calcium gluconate 1 g in D5W 100 mL intermittent infusion (has no administration in time range)   albuterol (PROVENTIL) (2.5 MG/3ML) 0.083% neb solution (has no administration in time range)       ED Vitals:  Vitals:    05/25/20 1400 05/25/20 1415 05/25/20 1430 05/25/20 1445   BP: 121/56 110/54 117/54 115/58   Pulse: 96 93 91 92   Resp: 16 11 11 13   Temp:       TempSrc:       SpO2: 95% 96% 96% 95%     ED labs and imaging:  Results for orders placed or performed during the hospital encounter of 05/25/20   XR Chest Port 1 View     Status: None    Narrative    CHEST ONE VIEW May 25, 2020 2:05 PM     HISTORY: Status post NG tube placement. Intentional polydrug overdose,  evaluate for tube location/placement.    COMPARISON: March 22, 2019.      Impression    IMPRESSION: NG tube tip in the left upper quadrant in the expected  location of the stomach. No definite acute infiltrates.    JEMIMA PALAFOX MD   CBC with platelets differential     Status: Abnormal   Result Value Ref Range    WBC 21.7 (H) 4.0 - 11.0 10e9/L    RBC Count 3.96 3.8 - 5.2 10e12/L    Hemoglobin 12.2 11.7 - 15.7 g/dL    Hematocrit 38.3 35.0 - 47.0 %    MCV 97 78 - 100 fl    MCH 30.8 26.5 - 33.0 pg    MCHC 31.9 31.5 - 36.5 g/dL    RDW 13.2 10.0 - 15.0 %    Platelet Count 357 150 - 450 10e9/L    Diff Method Automated Method     % Neutrophils 75.5 %    % Lymphocytes 16.3 %    % Monocytes 5.3 %    % Eosinophils 0.8 %    % Basophils 1.0 %    % Immature Granulocytes 1.1 %    Nucleated RBCs 0 0 /100    Absolute Neutrophil 16.4 (H) 1.6 - 8.3 10e9/L    Absolute Lymphocytes 3.5 0.8 - 5.3 10e9/L    Absolute Monocytes 1.2 0.0 - 1.3 10e9/L    Absolute Eosinophils 0.2 0.0 - 0.7 10e9/L    Absolute Basophils  "0.2 0.0 - 0.2 10e9/L    Abs Immature Granulocytes 0.2 0 - 0.4 10e9/L    Absolute Nucleated RBC 0.0    Comprehensive metabolic panel     Status: Abnormal   Result Value Ref Range    Sodium 137 133 - 144 mmol/L    Potassium 5.9 (H) 3.4 - 5.3 mmol/L    Chloride 109 94 - 109 mmol/L    Carbon Dioxide 21 20 - 32 mmol/L    Anion Gap 7 3 - 14 mmol/L    Glucose 247 (H) 70 - 99 mg/dL    Urea Nitrogen 26 7 - 30 mg/dL    Creatinine 1.63 (H) 0.52 - 1.04 mg/dL    GFR Estimate 41 (L) >60 mL/min/[1.73_m2]    GFR Estimate If Black 47 (L) >60 mL/min/[1.73_m2]    Calcium 8.3 (L) 8.5 - 10.1 mg/dL    Bilirubin Total 0.3 0.2 - 1.3 mg/dL    Albumin 3.2 (L) 3.4 - 5.0 g/dL    Protein Total 7.5 6.8 - 8.8 g/dL    Alkaline Phosphatase 209 (H) 40 - 150 U/L    ALT 41 0 - 50 U/L    AST 22 0 - 45 U/L   Salicylate level     Status: None   Result Value Ref Range    Salicylate Level <2 mg/dL   Acetaminophen level     Status: None   Result Value Ref Range    Acetaminophen Level 21 mg/L   HCG qualitative pregnancy (blood)     Status: None   Result Value Ref Range    HCG Qualitative Serum Negative NEG^Negative   Blood gas venous     Status: None   Result Value Ref Range    Ph Venous 7.35 7.32 - 7.43 pH    PCO2 Venous 41 40 - 50 mm Hg    PO2 Venous 40 25 - 47 mm Hg    Bicarbonate Venous 23 21 - 28 mmol/L    Base Deficit Venous 2.6 mmol/L    FIO2 HIDE    Potassium     Status: Abnormal   Result Value Ref Range    Potassium 6.1 (HH) 3.4 - 5.3 mmol/L         Assessments & Plan (with Medical Decision Making)   Clinical impression: 35-year-old female who presented by EMS from a local gas station after she was noted to have been acting bizarrely with concern for intentional drug overdose and ingestion. Report from the patient, EMS and bystanders is that she was noted to have drank a bottle of NyQuil at a gas station bathroom and reports that she took a \"bunch of Vicodin\" (~10am).  Patient presented altered GCS 14, improved during ED course post-EMS dose of " naloxone.  Patient is signed out at shift end awaiting medical clearance (with follow-up labs pending- repeat tylenol level, repeat potassium, and metabolic profile), serial neurologic exam. Plan at hand-off and sign out is to transfer for further care with concern for auditory hallucination with suicide attempt by intentional drug overdose /ingestion.  Patient was receiving treatment for hyperkalemia in the context of acute kidney injury superimposed on chronic kidney disease with plan for further care and final disposition depending on ED course.    ED course and Plan:   Reviewed the medical record.  Patient was monitored upon arrival with rapid assessment.  Patient was normotensive in normal sinus rhythm.  Arrival EKG revealed normal axis, no acute ischemia or arrhythmia.  QTc was 400 ms.  I reviewed patient's presentation with concern for polydrug ingestion/overdose with poison center who advised peak effect for dextromethorphan at 4 hours monitor for agitation and tachycardia.  Use benzodiazepines if medically necessary for anticholinergic effect.  Need 4 hr- Tylenol level.  Patient ingested these medications around her 12.48pm/12:50 PM- EMS report they received 911-call around that time. Patient reported she took vicodin around 10am.    I spoke with Dr. Lewis- triage hospitalist at the [facility] at 1.52pm.  We discussed patient's presentation and need for medical clearance and further consultation with psychiatry.  We agreed that patient would be medically cleared at [facility] pending COVID-19 test. Once medically cleared patient will be transferred to the [facility] for further evaluation by psychiatry.  COVID-19 test was initiated due to plan for admission to behavioral unit.     Work-up revealed leukocytosis which is chronic- (WBC-21.0 on 2/17/20).  Patient is not acidotic.  She has a negative salicylate and Tylenol level on arrival (expected obtained right around the time of presumed ingestion).  Repeat Tylenol level pending at 1700.  Patient has a history of stage III chronic kidney disease.  Her creatinine today is 1.63 (creatinine- 1.48 on 5/21/20).  She was noted to be hyperkalemic with a potassium of 5.9. repeat potassium remained elevated at 6.1- Treatment for hyperkalemia initiated per protocol.   Negative urine pregnancy. Post NG placement imaging reviewed independently.  NG was placed with the possibility of further intervention.    Patient signed out to Dr HOANG Nicholson- at shift end at 3.15pm. Plan at the time of signout is awaiting medical clearance with a plan for repeat acetaminophen level at 1700 and basic metabolic profile (recheck potassium and creatinine- post intervention) and COVID-19 test (asymptomatic, obtained with plan for admission to behavioral unit).  Additional intervention as clinically warranted if symptomatic after hand-off. Nursing (Lottie Gifford RN) updated family by phone (mother- Juliana Mcnally at [phone]). Consider DEC consult through [facility] once medically clear to facilitate transfer.        Disclaimer: This note consists of symbols derived from keyboarding, dictation and/or voice recognition software. As a result, there may be errors in the script that have gone undetected. Please consider this when interpreting information found in this chart.  I have reviewed the nursing notes.    I have reviewed the findings, diagnosis, plan and need for follow up with the patient.     Critical Care Addendum    My initial assessment, based on my review of prehospital provider report, review of vital signs, focused history, physical exam, review of cardiac rhythm monitor, 12 lead ECG analysis and discussion with EMS providers, established that Divina Delgadillo has respiratory insufficiency and and concern for possible toxic ingestion, which requires immediate intervention, and therefore she is critically ill.     After the initial assessment, the care team initiated multiple lab tests,  initiated IV fluid administration and initiated medication therapy with naloxone , and treatment for hyperkalemia to provide stabilization care. Due to the critical nature of this patient, I reassessed nursing observations, vital signs, physical exam, 12 lead ECG analysis, mental status, neurologic status and respiratory status multiple times prior to hand-off at signout.     Time also spent performing documentation, reviewing test results and coordination of care.     Critical care time (excluding teaching time and procedures): 60 minutes.     New Prescriptions    No medications on file       Final diagnoses:   Intentional drug overdose, initial encounter (H) - Vicodin and NyQuil   Acute kidney injury superimposed on CKD (H)   Auditory hallucinations   Hyperkalemia       5/25/2020   [facility] EMERGENCY DEPARTMENT     All Choe MD  05/25/20 8452

## 2020-05-25 NOTE — ED NOTES
Talked with Lorin (foster mom) with update per Divina. Will call again following lab work and DEC assessment

## 2020-05-25 NOTE — ED NOTES
Labs collected, pt remains a/o x 4, talked with her about the disagreement she had yesterday with her foster mom Lorin, she admits to getting upset which caused her to harm herself today

## 2020-05-25 NOTE — ED NOTES
Pt remains a/o x 4, no distress noted, talked with Cari at Poison control and gave update, will talk with them again follow labs at 1700. Monitor

## 2020-05-25 NOTE — PROGRESS NOTES
[facility] triage note    Called by ED at [facility] for request to transfer to [facility] medicine    35F c DM, A1c 7.8  P/w SA and auditory hallucinations to [facility] ED  Took multiple vicodin tabs, drinking bottle of nyquil  EMS called at 1248  NGT placed in ED    ALT 41, AST 22  Cr 1.6  Acetaminophen level 21 at 1330  salicylate < 2  Awaiting INR    Agree with medical monitoring for overdose at [facility], including airway monitoring (opiates) and trending liver enzymes and INR (acetaminophen)    Agree with need for urgent psychiatric evaluation for auditory hallucinations, and will need careful collateral on psychiatric history or context    Recommended to keep at [facility] for detox by ED and/or hospital medicine. When cleared from toxic ingestion, would call mental health central intake directly to discuss transfer for inpatient psychiatry at behavioral health [facility].

## 2020-05-25 NOTE — ED NOTES
Redraw of K sent to lab along with COVID swab. Pt is more awake and is asking us to call her mom, this will be done.

## 2020-05-25 NOTE — ED NOTES
Pt arrives by [facility], was unresponsive due to drug overdose and hold her own airway. Per EMS she was found down at gas station following consumption of > 12oz of NyQuil and possible Vicodin. EMS gave 2mg of Narcan over 2 min period which has made her more alert on arrival. Her speech is slurred and is responding somewhat to questions.

## 2020-05-25 NOTE — ED NOTES
Emergency Department Patient Sign-out       Brief HPI:  This is a 35 year old female signed out to me by Dr. Choe .  See initial ED Provider note for details of the presentation.            Significant Events prior to my assuming care: Patient seen and evaluated, lab diagnostics obtained, treatment of hyperkalemia in conjunction with AK I initiated by the initial attending provider.  Initial acetaminophen level is negative.  Pending repeat acetaminophen level at 4 hours as well as repeat potassium at that time.  Pending for admission to medicine if requiring further stabilization for clearance.  If medically cleared plan for discussion with an evaluation byDEC for psychiatric admission given the patient's suicidal ideation and the suicide attempt that her presentation represents.      Exam:   Patient Vitals for the past 24 hrs:   BP Temp Temp src Pulse Heart Rate Resp SpO2   05/25/20 1445 115/58 -- -- 92 90 13 95 %   05/25/20 1430 117/54 -- -- 91 92 11 96 %   05/25/20 1415 110/54 -- -- 93 92 11 96 %   05/25/20 1400 121/56 -- -- 96 95 16 95 %   05/25/20 1345 138/58 -- -- 101 103 14 --   05/25/20 1330 121/60 -- -- 99 99 18 97 %   05/25/20 1328 136/61 97.7  F (36.5  C) Axillary -- 101 16 97 %           ED RESULTS:   Results for orders placed or performed during the hospital encounter of 05/25/20 (from the past 24 hour(s))   CBC with platelets differential     Status: Abnormal    Collection Time: 05/25/20  1:30 PM   Result Value Ref Range    WBC 21.7 (H) 4.0 - 11.0 10e9/L    RBC Count 3.96 3.8 - 5.2 10e12/L    Hemoglobin 12.2 11.7 - 15.7 g/dL    Hematocrit 38.3 35.0 - 47.0 %    MCV 97 78 - 100 fl    MCH 30.8 26.5 - 33.0 pg    MCHC 31.9 31.5 - 36.5 g/dL    RDW 13.2 10.0 - 15.0 %    Platelet Count 357 150 - 450 10e9/L    Diff Method Automated Method     % Neutrophils 75.5 %    % Lymphocytes 16.3 %    % Monocytes 5.3 %    % Eosinophils 0.8 %    % Basophils 1.0 %    % Immature Granulocytes 1.1 %    Nucleated RBCs 0  0 /100    Absolute Neutrophil 16.4 (H) 1.6 - 8.3 10e9/L    Absolute Lymphocytes 3.5 0.8 - 5.3 10e9/L    Absolute Monocytes 1.2 0.0 - 1.3 10e9/L    Absolute Eosinophils 0.2 0.0 - 0.7 10e9/L    Absolute Basophils 0.2 0.0 - 0.2 10e9/L    Abs Immature Granulocytes 0.2 0 - 0.4 10e9/L    Absolute Nucleated RBC 0.0    Comprehensive metabolic panel     Status: Abnormal    Collection Time: 05/25/20  1:30 PM   Result Value Ref Range    Sodium 137 133 - 144 mmol/L    Potassium 5.9 (H) 3.4 - 5.3 mmol/L    Chloride 109 94 - 109 mmol/L    Carbon Dioxide 21 20 - 32 mmol/L    Anion Gap 7 3 - 14 mmol/L    Glucose 247 (H) 70 - 99 mg/dL    Urea Nitrogen 26 7 - 30 mg/dL    Creatinine 1.63 (H) 0.52 - 1.04 mg/dL    GFR Estimate 41 (L) >60 mL/min/[1.73_m2]    GFR Estimate If Black 47 (L) >60 mL/min/[1.73_m2]    Calcium 8.3 (L) 8.5 - 10.1 mg/dL    Bilirubin Total 0.3 0.2 - 1.3 mg/dL    Albumin 3.2 (L) 3.4 - 5.0 g/dL    Protein Total 7.5 6.8 - 8.8 g/dL    Alkaline Phosphatase 209 (H) 40 - 150 U/L    ALT 41 0 - 50 U/L    AST 22 0 - 45 U/L   Salicylate level     Status: None    Collection Time: 05/25/20  1:30 PM   Result Value Ref Range    Salicylate Level <2 mg/dL   Acetaminophen level     Status: None    Collection Time: 05/25/20  1:30 PM   Result Value Ref Range    Acetaminophen Level 21 mg/L   HCG qualitative pregnancy (blood)     Status: None    Collection Time: 05/25/20  1:30 PM   Result Value Ref Range    HCG Qualitative Serum Negative NEG^Negative   XR Chest Port 1 View     Status: None    Collection Time: 05/25/20  2:05 PM    Narrative    CHEST ONE VIEW May 25, 2020 2:05 PM     HISTORY: Status post NG tube placement. Intentional polydrug overdose,  evaluate for tube location/placement.    COMPARISON: March 22, 2019.      Impression    IMPRESSION: NG tube tip in the left upper quadrant in the expected  location of the stomach. No definite acute infiltrates.    JEMIMA PALAFOX MD   Blood gas venous     Status: None    Collection Time:  05/25/20  2:07 PM   Result Value Ref Range    Ph Venous 7.35 7.32 - 7.43 pH    PCO2 Venous 41 40 - 50 mm Hg    PO2 Venous 40 25 - 47 mm Hg    Bicarbonate Venous 23 21 - 28 mmol/L    Base Deficit Venous 2.6 mmol/L    FIO2 HIDE    Potassium     Status: Abnormal    Collection Time: 05/25/20  2:24 PM   Result Value Ref Range    Potassium 6.1 (HH) 3.4 - 5.3 mmol/L       ED MEDICATIONS:   Medications   0.9% sodium chloride BOLUS (0 mLs Intravenous Stopped 5/25/20 1413)     Followed by   sodium chloride 0.9% infusion (has no administration in time range)   ondansetron (ZOFRAN) injection 4 mg (4 mg Intravenous Given 5/25/20 1334)   0.9% sodium chloride BOLUS (has no administration in time range)   sodium chloride 0.9% infusion (1,000 mLs Intravenous New Bag 5/25/20 1413)   naloxone (NARCAN) injection 0.5 mg (has no administration in time range)   glucose gel 15-30 g (has no administration in time range)     Or   dextrose 50 % injection 25-50 mL (has no administration in time range)     Or   glucagon injection 1 mg (has no administration in time range)   sodium bicarbonate 8.4 % injection 50 mEq (has no administration in time range)   dextrose 10% infusion (has no administration in time range)   dextrose 50 % injection 25 g (has no administration in time range)   insulin (regular) (HumuLIN R/NovoLIN R) injection 8 Units (has no administration in time range)   albuterol (PROVENTIL) neb solution 10 mg (has no administration in time range)   calcium gluconate 1 g in D5W 100 mL intermittent infusion (has no administration in time range)   albuterol (PROVENTIL) (2.5 MG/3ML) 0.083% neb solution (has no administration in time range)   albuterol (PROVENTIL) (2.5 MG/3ML) 0.083% neb solution (has no administration in time range)         Impression:    ICD-10-CM    1. Intentional drug overdose, initial encounter (H)  T50.902A Potassium    Vicodin and NyQuil   2. Acute kidney injury superimposed on CKD (H)  N17.9     N18.9    3.  Auditory hallucinations  R44.0    4. Hyperkalemia  E87.5        Plan:    Pending studies include repeat acetaminophen level at 5:00 as well as repeat potassium level at the same time..        MD Bharat Jensen Kevin Ronald, MD  05/25/20 1524

## 2020-05-25 NOTE — ED NOTES
"Pt states \"there is a scary man's voice tell her she needs to leave that she shouldn't be here\", pt is answering questions however she is still slurring her words and hard to stay awake. She has told MD the name of her foster parents. She states \"I took it to stop the voices in my head:\" she is unsure how many Vicodin she took states \"I don't remember\"   "

## 2020-05-25 NOTE — ED NOTES
"Pt states \" I took 3-4 Vicodin this am at 10 along with my regular morning meds\" denies  Miss using regular scheduled medications. Prescription of Vicodin was given to her on Friday following major dental work, states \"I had eight teeth pulled\" she states she drank 12oz plus small amount from other bottle of NyQuil at noon today again to stop the voices in her head.    "

## 2020-05-26 ENCOUNTER — HOSPITAL ENCOUNTER (INPATIENT)
Facility: HOSPITAL | Age: 35
LOS: 3 days | Discharge: HOME | DRG: 885 | End: 2020-05-29
Attending: PSYCHIATRY & NEUROLOGY | Admitting: PSYCHIATRY & NEUROLOGY
Payer: MEDICARE

## 2020-05-26 VITALS
DIASTOLIC BLOOD PRESSURE: 64 MMHG | SYSTOLIC BLOOD PRESSURE: 141 MMHG | HEART RATE: 84 BPM | TEMPERATURE: 97.7 F | OXYGEN SATURATION: 95 %

## 2020-05-26 PROBLEM — F25.1 SCHIZOAFFECTIVE DISORDER, DEPRESSIVE TYPE (H): Status: ACTIVE | Noted: 2019-07-19

## 2020-05-26 PROBLEM — I82.629: Status: ACTIVE | Noted: 2017-01-09

## 2020-05-26 PROBLEM — F17.210 NICOTINE DEPENDENCE, CIGARETTES, UNCOMPLICATED: Status: ACTIVE | Noted: 2018-01-24

## 2020-05-26 PROBLEM — Z72.0 TOBACCO ABUSE: Status: ACTIVE | Noted: 2017-01-10

## 2020-05-26 PROBLEM — R45.89 SUICIDAL BEHAVIOR: Status: ACTIVE | Noted: 2020-05-26

## 2020-05-26 PROBLEM — Z85.07 PERSONAL HISTORY OF PANCREATIC CANCER: Status: ACTIVE | Noted: 2018-01-24

## 2020-05-26 PROBLEM — J45.909 ASTHMA: Status: ACTIVE | Noted: 2020-05-26

## 2020-05-26 PROBLEM — Z79.01 LONG TERM (CURRENT) USE OF ANTICOAGULANTS: Status: ACTIVE | Noted: 2017-01-09

## 2020-05-26 PROBLEM — E66.811 OBESITY (BMI 30.0-34.9): Status: ACTIVE | Noted: 2019-07-22

## 2020-05-26 LAB
GLUCOSE BLDC GLUCOMTR-MCNC: 172 MG/DL (ref 70–99)
GLUCOSE BLDC GLUCOMTR-MCNC: 173 MG/DL (ref 70–99)
GLUCOSE BLDC GLUCOMTR-MCNC: 90 MG/DL (ref 70–99)

## 2020-05-26 PROCEDURE — 25000132 ZZH RX MED GY IP 250 OP 250 PS 637: Performed by: NURSE PRACTITIONER

## 2020-05-26 PROCEDURE — 25000132 ZZH RX MED GY IP 250 OP 250 PS 637: Mod: GY | Performed by: NURSE PRACTITIONER

## 2020-05-26 PROCEDURE — 99223 1ST HOSP IP/OBS HIGH 75: CPT | Performed by: NURSE PRACTITIONER

## 2020-05-26 PROCEDURE — 99222 1ST HOSP IP/OBS MODERATE 55: CPT | Performed by: NURSE PRACTITIONER

## 2020-05-26 PROCEDURE — 12400000 ZZH R&B MH

## 2020-05-26 PROCEDURE — 00000146 ZZHCL STATISTIC GLUCOSE BY METER IP

## 2020-05-26 PROCEDURE — 25000131 ZZH RX MED GY IP 250 OP 636 PS 637: Mod: GY | Performed by: NURSE PRACTITIONER

## 2020-05-26 RX ORDER — CHOLECALCIFEROL (VITAMIN D3) 25 MCG
1 TABLET ORAL DAILY
Refills: 2 | Status: ON HOLD | COMMUNITY
Start: 2019-09-10 | End: 2020-05-26

## 2020-05-26 RX ORDER — OLANZAPINE 5 MG/1
5-10 TABLET ORAL 2 TIMES DAILY PRN
Status: DISCONTINUED | OUTPATIENT
Start: 2020-05-26 | End: 2020-05-29 | Stop reason: HOSPADM

## 2020-05-26 RX ORDER — AMOXICILLIN 500 MG/1
500 CAPSULE ORAL EVERY 12 HOURS SCHEDULED
Status: COMPLETED | OUTPATIENT
Start: 2020-05-26 | End: 2020-05-29

## 2020-05-26 RX ORDER — AMOXICILLIN 500 MG/1
500 CAPSULE ORAL 2 TIMES DAILY
Status: ON HOLD | COMMUNITY
Start: 2020-05-22 | End: 2020-05-29

## 2020-05-26 RX ORDER — ATORVASTATIN CALCIUM 10 MG/1
10 TABLET, FILM COATED ORAL DAILY
Status: DISCONTINUED | OUTPATIENT
Start: 2020-05-26 | End: 2020-05-29 | Stop reason: HOSPADM

## 2020-05-26 RX ORDER — CARVEDILOL 6.25 MG/1
6.25 TABLET ORAL 2 TIMES DAILY
COMMUNITY
End: 2020-06-12

## 2020-05-26 RX ORDER — ALUMINA, MAGNESIA, AND SIMETHICONE 2400; 2400; 240 MG/30ML; MG/30ML; MG/30ML
30 SUSPENSION ORAL EVERY 4 HOURS PRN
Status: DISCONTINUED | OUTPATIENT
Start: 2020-05-26 | End: 2020-05-29 | Stop reason: HOSPADM

## 2020-05-26 RX ORDER — AMLODIPINE BESYLATE 5 MG/1
5 TABLET ORAL DAILY
Status: DISCONTINUED | OUTPATIENT
Start: 2020-05-26 | End: 2020-05-29 | Stop reason: HOSPADM

## 2020-05-26 RX ORDER — HYDROXYZINE HYDROCHLORIDE 25 MG/1
25-50 TABLET, FILM COATED ORAL 3 TIMES DAILY PRN
Status: DISCONTINUED | OUTPATIENT
Start: 2020-05-26 | End: 2020-05-29 | Stop reason: HOSPADM

## 2020-05-26 RX ORDER — DEXTROSE MONOHYDRATE 25 G/50ML
25-50 INJECTION, SOLUTION INTRAVENOUS
Status: DISCONTINUED | OUTPATIENT
Start: 2020-05-26 | End: 2020-05-29 | Stop reason: HOSPADM

## 2020-05-26 RX ORDER — CARVEDILOL 6.25 MG/1
6.25 TABLET ORAL 2 TIMES DAILY
Status: DISCONTINUED | OUTPATIENT
Start: 2020-05-26 | End: 2020-05-29 | Stop reason: HOSPADM

## 2020-05-26 RX ORDER — ARIPIPRAZOLE 15 MG/1
30 TABLET ORAL DAILY
Status: DISCONTINUED | OUTPATIENT
Start: 2020-05-26 | End: 2020-05-29 | Stop reason: HOSPADM

## 2020-05-26 RX ORDER — LISINOPRIL 5 MG/1
5 TABLET ORAL DAILY
Status: DISCONTINUED | OUTPATIENT
Start: 2020-05-26 | End: 2020-05-29 | Stop reason: HOSPADM

## 2020-05-26 RX ORDER — AMLODIPINE BESYLATE 5 MG/1
5 TABLET ORAL DAILY
Status: ON HOLD | COMMUNITY
End: 2020-05-28

## 2020-05-26 RX ORDER — INSULIN GLARGINE 100 [IU]/ML
19 INJECTION, SOLUTION SUBCUTANEOUS DAILY
COMMUNITY
End: 2020-08-14

## 2020-05-26 RX ORDER — MONTELUKAST SODIUM 10 MG/1
10 TABLET ORAL AT BEDTIME
Status: DISCONTINUED | OUTPATIENT
Start: 2020-05-26 | End: 2020-05-29 | Stop reason: HOSPADM

## 2020-05-26 RX ORDER — LORATADINE 10 MG/1
10 TABLET ORAL EVERY MORNING
Status: DISCONTINUED | OUTPATIENT
Start: 2020-05-27 | End: 2020-05-29 | Stop reason: HOSPADM

## 2020-05-26 RX ORDER — LAMOTRIGINE 100 MG/1
100 TABLET ORAL AT BEDTIME
Status: DISCONTINUED | OUTPATIENT
Start: 2020-05-26 | End: 2020-05-29 | Stop reason: HOSPADM

## 2020-05-26 RX ORDER — NICOTINE POLACRILEX 4 MG
15-30 LOZENGE BUCCAL
Status: DISCONTINUED | OUTPATIENT
Start: 2020-05-26 | End: 2020-05-29 | Stop reason: HOSPADM

## 2020-05-26 RX ORDER — NICOTINE 21 MG/24HR
1 PATCH, TRANSDERMAL 24 HOURS TRANSDERMAL DAILY
Status: DISCONTINUED | OUTPATIENT
Start: 2020-05-26 | End: 2020-05-29 | Stop reason: HOSPADM

## 2020-05-26 RX ORDER — LAMOTRIGINE 100 MG/1
100 TABLET ORAL AT BEDTIME
COMMUNITY
Start: 2020-05-06

## 2020-05-26 RX ORDER — OLANZAPINE 10 MG/2ML
5-10 INJECTION, POWDER, FOR SOLUTION INTRAMUSCULAR 2 TIMES DAILY PRN
Status: DISCONTINUED | OUTPATIENT
Start: 2020-05-26 | End: 2020-05-29 | Stop reason: HOSPADM

## 2020-05-26 RX ORDER — BISACODYL 10 MG
10 SUPPOSITORY, RECTAL RECTAL DAILY PRN
Status: DISCONTINUED | OUTPATIENT
Start: 2020-05-26 | End: 2020-05-29 | Stop reason: HOSPADM

## 2020-05-26 RX ORDER — ALBUTEROL SULFATE 0.83 MG/ML
2.5 SOLUTION RESPIRATORY (INHALATION) EVERY 6 HOURS PRN
Status: DISCONTINUED | OUTPATIENT
Start: 2020-05-26 | End: 2020-05-29 | Stop reason: HOSPADM

## 2020-05-26 RX ADMIN — LAMOTRIGINE 100 MG: 100 TABLET ORAL at 21:44

## 2020-05-26 RX ADMIN — FLUOXETINE 20 MG: 20 CAPSULE ORAL at 16:40

## 2020-05-26 RX ADMIN — LISINOPRIL 5 MG: 5 TABLET ORAL at 16:40

## 2020-05-26 RX ADMIN — HYDROXYZINE HYDROCHLORIDE 50 MG: 25 TABLET ORAL at 09:13

## 2020-05-26 RX ADMIN — NICOTINE 1 PATCH: 21 PATCH, EXTENDED RELEASE TRANSDERMAL at 08:47

## 2020-05-26 RX ADMIN — OMEPRAZOLE 20 MG: 20 CAPSULE, DELAYED RELEASE ORAL at 16:41

## 2020-05-26 RX ADMIN — ATORVASTATIN CALCIUM 10 MG: 10 TABLET, FILM COATED ORAL at 16:41

## 2020-05-26 RX ADMIN — AMOXICILLIN 500 MG: 500 CAPSULE ORAL at 21:44

## 2020-05-26 RX ADMIN — CLOZAPINE 150 MG: 50 TABLET ORAL at 21:44

## 2020-05-26 RX ADMIN — MONTELUKAST 10 MG: 10 TABLET, FILM COATED ORAL at 21:44

## 2020-05-26 RX ADMIN — ARIPIPRAZOLE 30 MG: 15 TABLET ORAL at 16:41

## 2020-05-26 RX ADMIN — AMLODIPINE BESYLATE 5 MG: 5 TABLET ORAL at 16:40

## 2020-05-26 RX ADMIN — CARVEDILOL 6.25 MG: 6.25 TABLET, FILM COATED ORAL at 21:45

## 2020-05-26 RX ADMIN — INSULIN GLARGINE 19 UNITS: 100 INJECTION, SOLUTION SUBCUTANEOUS at 16:41

## 2020-05-26 ASSESSMENT — ACTIVITIES OF DAILY LIVING (ADL)
DRESS: 0-->INDEPENDENT
BATHING: 0-->INDEPENDENT
RETIRED_COMMUNICATION: 0-->UNDERSTANDS/COMMUNICATES WITHOUT DIFFICULTY
ORAL_HYGIENE: INDEPENDENT
DRESS: SCRUBS (BEHAVIORAL HEALTH);INDEPENDENT
AMBULATION: 0-->INDEPENDENT
FALL_HISTORY_WITHIN_LAST_SIX_MONTHS: YES
NUMBER_OF_TIMES_PATIENT_HAS_FALLEN_WITHIN_LAST_SIX_MONTHS: 1
DRESS: SCRUBS (BEHAVIORAL HEALTH);INDEPENDENT
TRANSFERRING: 0-->INDEPENDENT
COGNITION: 0 - NO COGNITION ISSUES REPORTED
ORAL_HYGIENE: INDEPENDENT
SWALLOWING: 0-->SWALLOWS FOODS/LIQUIDS WITHOUT DIFFICULTY
LAUNDRY: UNABLE TO COMPLETE
LAUNDRY: UNABLE TO COMPLETE
HYGIENE/GROOMING: INDEPENDENT
RETIRED_EATING: 0-->INDEPENDENT
TOILETING: 0-->INDEPENDENT
HYGIENE/GROOMING: INDEPENDENT

## 2020-05-26 ASSESSMENT — MIFFLIN-ST. JEOR: SCORE: 1469.28

## 2020-05-26 NOTE — ED NOTES
Attempted to call Foster Mother Lorin with update on admission. No answer. Brief message left with approval of patient to do so.

## 2020-05-26 NOTE — ED PROVIDER NOTES
Emergency Department Patient Sign-out       Brief HPI:  This is a 35 year old female signed out to me by Dr. Nicholson .  See initial ED Provider note for details of the presentation.            Significant Events prior to my assuming care: none    ==================================================================    CHART REVIEW:      Previous workup of leukocytosis:      FINAL DIAGNOSIS:  Peripheral Smear Morphology:  - Mild to moderate leukocytosis with absolute neutrophilia with minimal   left shift (rare circulating myelocyte  and metamyelocyte on scan); no blasts or dysplasia seen  - Borderline lymphocytosis without atypia.     COMMENT:  The patient has a history of neutrophilia since 2015 and is a former   smoker.  The neutrophilia may be  reactive, likely related to an infectious or inflammatory reaction or mild   physiologic stress. If the patient  is a smoker it should also be noted that patient's who smoke can have   persisting leukocytosis even after  cessation of smoking (Reference: Smoking-induced leukocytosis can persist                             after cessation of smoking.  Arch  Med Res. 2004  May-Jun; 35(3): 246-50).  Next generation sequencing is   presently pending for the  myeloproliferative neoplasm panel.     Electronically signed out by:     KENNEDY Nobles M.D.     CLINICAL HISTORY:  33-year-old female.     PERIPHERAL BLOOD DATA:  PERIPHERAL BLOOD DATA (Date: 8/1/18)  Patient Value (Reference Range >18 year old female)  18.95    WBC        (4.0-11.0 x 10*9/L)  3.85    RBC         (3.8-5.2 x 10*12/L)  12.0    HGB         (11.7-15.7 g/dL)  37.7    HCT         (35.0-47.0 %)  97.9    MCV        (78-100fL)  31.2     MCH        (26.5-33.0 pg)  31.8     MCHC     (31.5-36.5 g/dL)  13.6     RDW       (10.0-15.0 %)  322     PLT         (150-450 x 10*9/L)     PERIPHERAL BLOOD DIFFERENTIAL - Manual 200 cells.  (Reference ranges >18 year old)     Percent  61.0%  Neutrophils  29.5%  Lymphocytes  7.0%    Monocytes  2.0%   Eosinophils  0.5%   Basophils     Absolute  11.56  Neutrophils  (Ref normal 1.6 - 8.3 x 10*9/L)  5.59   Lymphocytes  (                          Ref normal 0.8 - 5.3 x 10*9/L)  1.33   Monocytes  (Ref normal 0 -1.3 x 10*9/L)  0.38   Eosinophils  (Ref normal 0 - 0.7 x 10*9/L)  0.09   Basophils  (Ref normal 0 - 0.2 x 10*9/L)     PERIPHERAL BLOOD MORPHOLOGY:     ERYTHROCYTES:  The red cells are normocytic, normochromic and normal in   number for the patient's age and  gender. No significant anisopoikilocytosis is seen. No morphologic   features of hemolysis or increased  polychromasia are identified.  No parasites are identified.     LEUKOCYTES:  The leukocytes are morphologically normal and moderately   increased in number with a mature  neutrophilia and borderline lymphocytosis.  Rare neutrophil myelocytes and   metamyelocytes are seen on scan.  No blasts or evidence of dysplasia is seen. No atypical lymphoid cells are   seen. No parasites or parasitic  inclusions are seen.     PLATELETS:  The platelets are normal in number and morphology. (Dictated   by: MARINA Nobles MD 08/02/2018)         END CHART REVIEW  ==================================================================      Exam:   Patient Vitals for the past 24 hrs:   BP Temp Temp src Pulse Heart Rate Resp SpO2   05/25/20 2030 136/65 -- -- 89 89 14 --   05/25/20 2015 136/66 -- -- 89 89 19 --   05/25/20 2000 134/67 -- -- 89 89 17 --   05/25/20 1945 127/68 -- -- 91 90 15 --   05/25/20 1930 139/59 -- -- 92 90 17 96 %   05/25/20 1900 (!) 149/70 -- -- 93 93 12 --   05/25/20 1845 (!) 144/76 -- -- 91 90 16 --   05/25/20 1830 125/63 -- -- 91 92 13 --   05/25/20 1820 136/64 -- -- -- 93 10 --   05/25/20 1815 136/64 -- -- 92 93 14 95 %   05/25/20 1810 -- -- -- -- 93 15 95 %   05/25/20 1800 (!) 146/68 -- -- 86 91 15 97 %   05/25/20 1750 139/73 -- -- -- 92 14 96 %   05/25/20 1740 -- -- -- -- 90 14 96 %   05/25/20 1730 134/57 -- -- 91 88 17 96 %    05/25/20 1720 119/59 -- -- -- 91 14 96 %   05/25/20 1700 (!) 153/66 -- -- 94 95 17 96 %   05/25/20 1640 -- -- -- -- 92 14 94 %   05/25/20 1630 117/58 -- -- 92 92 16 92 %   05/25/20 1620 122/58 -- -- -- 93 11 93 %   05/25/20 1610 -- -- -- -- 92 10 94 %   05/25/20 1600 122/53 -- -- 92 92 13 97 %   05/25/20 1545 103/54 -- -- 92 92 13 95 %   05/25/20 1530 105/50 -- -- 89 89 20 93 %   05/25/20 1515 111/55 -- -- 88 88 10 94 %   05/25/20 1445 115/58 -- -- 92 90 13 95 %   05/25/20 1430 117/54 -- -- 91 92 11 96 %   05/25/20 1415 110/54 -- -- 93 92 11 96 %   05/25/20 1400 121/56 -- -- 96 95 16 95 %   05/25/20 1345 138/58 -- -- 101 103 14 --   05/25/20 1330 121/60 -- -- 99 99 18 97 %   05/25/20 1328 136/61 97.7  F (36.5  C) Axillary -- 101 16 97 %           ED RESULTS:   Results for orders placed or performed during the hospital encounter of 05/25/20 (from the past 24 hour(s))   CBC with platelets differential     Status: Abnormal    Collection Time: 05/25/20  1:30 PM   Result Value Ref Range    WBC 21.7 (H) 4.0 - 11.0 10e9/L    RBC Count 3.96 3.8 - 5.2 10e12/L    Hemoglobin 12.2 11.7 - 15.7 g/dL    Hematocrit 38.3 35.0 - 47.0 %    MCV 97 78 - 100 fl    MCH 30.8 26.5 - 33.0 pg    MCHC 31.9 31.5 - 36.5 g/dL    RDW 13.2 10.0 - 15.0 %    Platelet Count 357 150 - 450 10e9/L    Diff Method Automated Method     % Neutrophils 75.5 %    % Lymphocytes 16.3 %    % Monocytes 5.3 %    % Eosinophils 0.8 %    % Basophils 1.0 %    % Immature Granulocytes 1.1 %    Nucleated RBCs 0 0 /100    Absolute Neutrophil 16.4 (H) 1.6 - 8.3 10e9/L    Absolute Lymphocytes 3.5 0.8 - 5.3 10e9/L    Absolute Monocytes 1.2 0.0 - 1.3 10e9/L    Absolute Eosinophils 0.2 0.0 - 0.7 10e9/L    Absolute Basophils 0.2 0.0 - 0.2 10e9/L    Abs Immature Granulocytes 0.2 0 - 0.4 10e9/L    Absolute Nucleated RBC 0.0    Comprehensive metabolic panel     Status: Abnormal    Collection Time: 05/25/20  1:30 PM   Result Value Ref Range    Sodium 137 133 - 144 mmol/L     Potassium 5.9 (H) 3.4 - 5.3 mmol/L    Chloride 109 94 - 109 mmol/L    Carbon Dioxide 21 20 - 32 mmol/L    Anion Gap 7 3 - 14 mmol/L    Glucose 247 (H) 70 - 99 mg/dL    Urea Nitrogen 26 7 - 30 mg/dL    Creatinine 1.63 (H) 0.52 - 1.04 mg/dL    GFR Estimate 41 (L) >60 mL/min/[1.73_m2]    GFR Estimate If Black 47 (L) >60 mL/min/[1.73_m2]    Calcium 8.3 (L) 8.5 - 10.1 mg/dL    Bilirubin Total 0.3 0.2 - 1.3 mg/dL    Albumin 3.2 (L) 3.4 - 5.0 g/dL    Protein Total 7.5 6.8 - 8.8 g/dL    Alkaline Phosphatase 209 (H) 40 - 150 U/L    ALT 41 0 - 50 U/L    AST 22 0 - 45 U/L   Salicylate level     Status: None    Collection Time: 05/25/20  1:30 PM   Result Value Ref Range    Salicylate Level <2 mg/dL   Acetaminophen level     Status: None    Collection Time: 05/25/20  1:30 PM   Result Value Ref Range    Acetaminophen Level 21 mg/L   HCG qualitative pregnancy (blood)     Status: None    Collection Time: 05/25/20  1:30 PM   Result Value Ref Range    HCG Qualitative Serum Negative NEG^Negative   XR Chest Port 1 View     Status: None    Collection Time: 05/25/20  2:05 PM    Narrative    CHEST ONE VIEW May 25, 2020 2:05 PM     HISTORY: Status post NG tube placement. Intentional polydrug overdose,  evaluate for tube location/placement.    COMPARISON: March 22, 2019.      Impression    IMPRESSION: NG tube tip in the left upper quadrant in the expected  location of the stomach. No definite acute infiltrates.    JEMIMA PALAFOX MD   Blood gas venous     Status: None    Collection Time: 05/25/20  2:07 PM   Result Value Ref Range    Ph Venous 7.35 7.32 - 7.43 pH    PCO2 Venous 41 40 - 50 mm Hg    PO2 Venous 40 25 - 47 mm Hg    Bicarbonate Venous 23 21 - 28 mmol/L    Base Deficit Venous 2.6 mmol/L    FIO2 HIDE    Asymptomatic COVID-19 Virus (Coronavirus) by PCR     Status: None    Collection Time: 05/25/20  2:24 PM    Specimen: Nasopharyngeal   Result Value Ref Range    COVID-19 Virus PCR to [facility] - Source Nasopharyngeal     COVID-19 Virus  PCR to [facility] - Result       Test received-See reflex to IDDL test SARS CoV2 (COVID-19) Virus RT-PCR   Potassium     Status: Abnormal    Collection Time: 05/25/20  2:24 PM   Result Value Ref Range    Potassium 6.1 (HH) 3.4 - 5.3 mmol/L   SARS-CoV-2 COVID-19 Virus (Coronavirus) RT-PCR Nasopharyngeal     Status: None    Collection Time: 05/25/20  2:24 PM    Specimen: Nasopharyngeal   Result Value Ref Range    SARS-CoV-2 Virus Specimen Source Nasopharyngeal     SARS-CoV-2 PCR Result NEGATIVE     SARS-CoV-2 PCR Comment       This automated, real-time RT-PCR assay by [employer] on the [employer] has   been given Emergency Use Authorization (EUA) for the in vitro qualitative detection of RNA   from the SARS-CoV2 virus in nasopharyngeal swabs in viral transport medium from patients   with signs and symptoms of infection who are suspected of COVID-19. The performance is   unknown in asymptomatic patients.     Drug abuse screen urine     Status: Abnormal    Collection Time: 05/25/20  4:55 PM   Result Value Ref Range    Amphetamine Qual Urine Negative NEG^Negative    Barbiturates Qual Urine Negative NEG^Negative    Benzodiazepine Qual Urine Negative NEG^Negative    Cannabinoids Qual Urine Negative NEG^Negative    Cocaine Qual Urine Negative NEG^Negative    Opiates Qualitative Urine Positive (A) NEG^Negative    PCP Qual Urine Negative NEG^Negative   Acetaminophen level     Status: None    Collection Time: 05/25/20  5:03 PM   Result Value Ref Range    Acetaminophen Level <2 mg/L   Basic metabolic panel     Status: Abnormal    Collection Time: 05/25/20  5:03 PM   Result Value Ref Range    Sodium 141 133 - 144 mmol/L    Potassium 4.3 3.4 - 5.3 mmol/L    Chloride 110 (H) 94 - 109 mmol/L    Carbon Dioxide 26 20 - 32 mmol/L    Anion Gap 5 3 - 14 mmol/L    Glucose 228 (H) 70 - 99 mg/dL    Urea Nitrogen 25 7 - 30 mg/dL    Creatinine 1.43 (H) 0.52 - 1.04 mg/dL    GFR Estimate 48 (L) >60 mL/min/[1.73_m2]    GFR Estimate  If Black 55 (L) >60 mL/min/[1.73_m2]    Calcium 9.1 8.5 - 10.1 mg/dL       ED MEDICATIONS:   Medications   0.9% sodium chloride BOLUS (0 mLs Intravenous Stopped 5/25/20 1413)     Followed by   sodium chloride 0.9% infusion (has no administration in time range)   ondansetron (ZOFRAN) injection 4 mg (4 mg Intravenous Given 5/25/20 1334)   0.9% sodium chloride BOLUS (has no administration in time range)   sodium chloride 0.9% infusion (1,000 mLs Intravenous New Bag 5/25/20 1413)   naloxone (NARCAN) injection 0.5 mg (has no administration in time range)   glucose gel 15-30 g (has no administration in time range)     Or   dextrose 50 % injection 25-50 mL (has no administration in time range)     Or   glucagon injection 1 mg (has no administration in time range)   dextrose 10% infusion ( Intravenous New Bag 5/25/20 1616)   sodium bicarbonate 8.4 % injection 50 mEq (50 mEq Intravenous Given 5/25/20 1542)   dextrose 50 % injection 25 g (25 g Intravenous Given 5/25/20 1543)   insulin (regular) (HumuLIN R/NovoLIN R) injection 8 Units (8 Units Intravenous Given 5/25/20 1546)   albuterol (PROVENTIL) neb solution 10 mg (10 mg Nebulization Given 5/25/20 1548)   calcium gluconate 1 g in D5W 100 mL intermittent infusion (1 g Intravenous New Bag 5/25/20 1541)   albuterol (PROVENTIL) (2.5 MG/3ML) 0.083% neb solution (2.5 mg  Given 5/25/20 1546)   albuterol (PROVENTIL) (2.5 MG/3ML) 0.083% neb solution (12.5 mg  Given 5/25/20 1545)         Impression:    ICD-10-CM    1. Intentional drug overdose, initial encounter (H)  T50.902A Asymptomatic COVID-19 Virus (Coronavirus) by PCR     Potassium     Acetaminophen level     Basic metabolic panel     Drug abuse screen urine     SARS-CoV-2 COVID-19 Virus (Coronavirus) RT-PCR     SARS-CoV-2 COVID-19 Virus (Coronavirus) RT-PCR Nasopharyngeal     CANCELED: Potassium     CANCELED: Potassium    Vicodin and NyQuil   2. Acute kidney injury superimposed on CKD (H)  N17.9     N18.9    3. Auditory  hallucinations  R44.0    4. Hyperkalemia  E87.5        Plan:    Pending studies include None. Awaiting bed placement.      Patient transferred by EMS to the receiving facility.  No events occurred while under my care in the ED.    MD Keyon Fajardo, Sean Blanton MD  05/27/20 2012

## 2020-05-26 NOTE — ED NOTES
7:23 PM  The patient was assessed byDECARYN Limon.  He agrees that the patient needs to be admitted for psychiatric reasons given her recent suicide attempt.  She is cleared at this point medically.  Her 4-hour acetaminophen is negative and repeat potassium is in normal range.  The patient is alert able to eat and drink and is cooperative although does not want to be here.  She agrees with the plan however for psychiatric admission.  We are in the process of finding a bed for her.       Frank Nicholson MD  05/25/20 1924

## 2020-05-26 NOTE — TELEPHONE ENCOUNTER
S: Pt is a 35 yrs old female in the [facility] ED for suicidal attempt, reports by Braxton.    B: Pt came in earlier due to a suicidal overdose.  Pt reports having an argument with her foster mother that has been going on for several days.  She went to buy Nyquill at a [email or web address] , drank whole bottle and proceeded to a nearby gas station to attempt to do the same when an attendant called paramedics.  Pt was brought in to the ED.  Pt has a hx of suicidal ideation and was hospitalized at 5/18/2020 at [facility].   Pt provided little information as she appeared to be somewhat confused and delayed to her answers/replies.      COVID test was negative.  Potassium was 6.1 at 2:24PM.  Glucose was 228.  Creatinine was 1.43  GFR Estimate was 48  Utox was pos for Opiates  CMP: Potassium 5.9, Glucose 247, Creatinine 1.63, Alkaline Phosphatase 209,   CBC: WBC 21.7, Absolute Neutrophil,     A: Legal guardian is Sue Zaragoza at [facility].    10:05PM- ED has not talked to legal guardian about placement.  Will call intake back after talking to guardian. 10:12PM-Liberty Granado, gave consent to placement.  [phone]-emergency.    R: 10:40PM- [facility] accepted for [facility].  Unit and ED notified.       Patient cleared and ready for behavioral bed placement: Yes

## 2020-05-26 NOTE — PROGRESS NOTES
05/26/20 0408   Patient Belongings   Did you bring any home meds/supplements to the hospital?  No   Patient Belongings locker;remains with patient   Patient Belongings Remaining with Patient other (see comments)  (Head phones, black shoes, pink underwear, cigarette case w/ cigarettes, grey socks, black bra, black pants)   Patient Belongings Put in Hospital Secure Location (Security or Locker, etc.) glasses   Belongings Search Yes   Clothing Search Yes   Second Staff .   List items sent to safe: [employer] w/case, visa debit card, [place] ID card, Cross necklace, metal bracelet,  All other belongings put in assigned cubby in belongings room.        I have reviewed my belongings list on admission and verify that it is correct.     Patient signature_______________________________    Second staff witness (if patient unable to sign) ______________________________       I have received all my belongings at discharge.    Patient signature________________________________    LAURIE Govea  5/26/2020  4:11 AM

## 2020-05-26 NOTE — ED NOTES
Mom Lorin and Juliana both called within 5 min of each other, talked with them both and asked if just one could take the up dates, this was agreed on that Lorin foster mom will take all the up dates moving forward. [phone]

## 2020-05-26 NOTE — PLAN OF CARE
Face to face end of shift report communicated from Shelley PEREYRA RN. Pt in unge at the start of the shift,.   Problem: Adult Behavioral Health Plan of Care  Goal: Patient-Specific Goal (Individualization)  Description: Pt will follow recommendations of treatment team.  Pt will be compliant with medications.  Pt will sleep 6-8 hours each night.  Pt will attend 50 % of groups.    Pt socializing with peer at the start of the shift. Pt reports pain in her feet at 3/10. Pt states that it is diabetic nerve pain and declines prn. Pt reports depression at 6/10. Pt states that she does have coping skills that help her with her depression. Pt journals and listens to music, nursing will provide pt with a journal and headphones. Pt reports anxiety at 8/10 this morning. Pt usually walks when she is anxious but without her shoes pt's feet are too painful to walk in the halls. Pt denies SI/HI this morning. Pt denies voices and visual hallucinations but does report that she does have auditory hallucinations at times.     Pt's shoes were given to her without the laces, hydroxyzine 50mg given at 0913 for anxiety.     Pt will have blood sugar tested TID before meals with sliding scale. Pt ate lunch prior to order for insulin. Pt tested after lunch, 172 at this time, insulin not given.     5/26/2020 0752 by Magy Barrow RN  Outcome: Improving  Note:        Problem: Suicide Risk  Goal: Absence of Self-Harm  Description: Pt will remain free from self harm/injury during hospitalization.  Pt will have a decrease in suicidal ideation during hospitalization.    5/26/2020 0752 by Magy Barrow RN  Outcome: Improving     Problem: Fall Injury Risk  Goal: Absence of Fall and Fall-Related Injury  Description: Pt will remain free from falls during hospitalization.  Pt will wear non skid socks/appropriate footwear.     Pt has been   5/26/2020 0752 by Magy Barrow RN  Outcome: Improving       Magy Barrow RN  5/26/2020  7:53  AM

## 2020-05-26 NOTE — PLAN OF CARE
Prior to Admission Medication Reconciliation:     Medications added:   [] None  [x] As listed below:    Amoxicillin BID x 7 days per rx and foster care    Medications deleted:   [] None  [x] As listed below:    Vit d- not on medlist from foster care     Changes made to existing medications:   [] None  [x] As listed below:     amlodipine- rx is 5mg take 10 mg daily, foster care administers 5 mg daily per doctor order    Coreg- rx is 6.25 mg 2 qam and 1 pm but foster care states she gets 1 tab BID per doctors order    lantus to basaglar per rx    Last times/dates taken verified with patient:  [x] Yes- completed myself: yesterday morning per   [] Nurse completed no changes made  [] Unable to review with patient:  [] Nurse completed/changes made:     Allergy modifications:    [x]Did not review  []Patient verified NKA  []Patient verified current existing allergies: no changes made  []New allergies: listed below    Medication reconciliation sources:   []Patient  []Patient family member/emergency contact: **  [][facility] Report Review  []Epic Chart Review  []Care Everywhere review  [x]Pharmacy med list: [facility]    norco 5/325 1 tab q4-6 hr prn pain 5/22/20 (finished Saturday- per foster care)    Amoxicillin 500 mg caps BID x 7 days 5/22/20    Lisinopril 5 mg daily 5/21/20    basaglar inject 19 units daily. Increase by 2 units every 3-4 days until BG ranging around 140-150. Max dose 25 units/day for titration.     ozempic 1 mg q7days    Carvedilol 6.25 mg 2 tabs qam and 1 tab qpm 90/30 ds 5/6/20 (been giving one in am)     Atorvastatin 10 mg daily 5/6/20    Loratadine 10 mg daily 5/6/20    Montelukast 10 mg qhs 5/6/20    Amlodipine 5 mg 2 tabs daily 5/6/20 (5 mg daily per foster care)    Clozapine 100 mg 1.5 tabs bid 90/30 5/6/20    Omeprazole 20 mg daily 5/6/20    Fluoxetine 20 mg qam 5/6/20    Ventolin  2 puffs q6h prn  5/6/20    lamictal 100 mg 30/30 5/6/20  []Pharmacy phone call  []Outside meds dispense  report  []Nursing home MAR:  [x]Other: medlist from foster home:    Aripiprazole 30 mg daily    Loratadine 10 mg daily    Fluoxetine 20 mg daily    Omeprazole 20 mg daily    Clozapine 150 mg BID    prevident 5000 BID    almodipine 5 mg daily    Montelukast 10 mg daily    Ranitidine 300 mg daily pm    ozempic 1 mg weekly Thursdays    lantus 19 units qam    Lisinopril 5 mg daily    Lamotrigine 100 mg at bedtime    Carvedilol 6.25 mg BID    Atorvastatin 10 mg daily    Amoxicillin 500 mg daily x 7 days start 5/22/20    Pharmacy desired at discharge: [facility]    Is patient on coumadin?  [x]No      Requests for consultation by provider or pharmacist:   [] Patient understands why all of their meds were prescribed and how to take them. No questions.   [x] Fill dates coincide with compliancy for all maintenance meds.   [] Fill dates do not coincide with compliancy with maintenance meds. See notes in PTA medlist about how patient is taking.   [] Patient has questions about the following:    Comments: meds managed/administered by foster parents. No concerns.       Francoise Cooper on 5/26/2020 at 1:31 PM       Discrepancies: [x] No []Not Applicable []Yes: listed below        Time spent on medication reconciliation:   []5-20 mins  [x]20-40 mins  []> 40 mins    Issues completing PTA medication reconciliation:  [] On hold for a long time  [] Waited for a call back  [] Fax didn't come through  [] Fax took a long time  [] Other:    Notifying appropriate party of changes/additions/discrepancies:  []No changes made, notification not necessary.   [] Notified attending provider via text page  [] Notified attending provider in person  [] Notified pharmacy  [] Notified nurse  [] Attending provider not available, left detailed notes  [x] Changes/additions made don't need provider notification because provider has not seen patient or input any orders  [] Changes/additions made don't need provider notification because changes made are  to medications not ordered  Medications Prior to Admission   Medication Sig Dispense Refill Last Dose     amLODIPine (NORVASC) 5 MG tablet Take 5 mg by mouth daily   5/25/2020 at Unknown time     amoxicillin (AMOXIL) 500 MG capsule Take 500 mg by mouth 2 times daily X 7 days   5/25/2020 at Unknown time     ARIPiprazole (ABILIFY) 30 MG tablet Take 30 mg by mouth daily   5/25/2020 at Unknown time     atorvastatin (LIPITOR) 10 MG tablet Take 1 tablet (10 mg) by mouth daily 90 tablet 1 5/25/2020 at Unknown time     carvedilol (COREG) 6.25 MG tablet Take 6.25 mg by mouth 2 times daily   5/25/2020 at Unknown time     cloZAPine (CLOZARIL) 100 MG tablet Take 150 mg by mouth 2 times daily    5/25/2020 at Unknown time     FLUoxetine (PROZAC) 20 MG capsule Take 20 mg by mouth daily   5/25/2020 at Unknown time     insulin glargine (BASAGLAR KWIKPEN) 100 UNIT/ML pen Inject 19 Units Subcutaneous daily increase by 2 units every 3-4 days untill BG ranging around 140-150', max dose 25 units/day for titration.   5/25/2020 at Unknown time     lamoTRIgine (LAMICTAL) 100 MG tablet Take 100 mg by mouth At Bedtime   Past Week at Unknown time     lisinopril (ZESTRIL) 5 MG tablet Take 1 tablet (5 mg) by mouth daily 30 tablet 3 5/25/2020 at Unknown time     loratadine (CLARITIN) 10 MG tablet Take 1 tablet (10 mg) by mouth every morning 30 tablet 5 5/25/2020 at Unknown time     montelukast (SINGULAIR) 10 MG tablet Take 1 tablet (10 mg) by mouth At Bedtime 30 tablet 5 Past Week at Unknown time     omeprazole (PRILOSEC) 20 MG DR capsule Take 1 capsule (20 mg) by mouth daily 60 capsule 5 5/25/2020 at Unknown time     ranitidine (ZANTAC) 300 MG tablet Take 300 mg by mouth At Bedtime    Past Week at Unknown time     blood glucose (ACCU-CHEK GUIDE) test strip Use to test blood sugar 3 times daily (accu chek GUIDE). 150 strip 11 Unknown at Unknown time     blood glucose monitoring (ACCU-CHEK FASTCLIX) lancets Use to test blood sugar 3 times daily  102 each 11 Unknown at Unknown time     nicotine (NICOTROL) 10 MG inhaler Inhale 6-10 Cartridges into the lungs daily as needed for smoking cessation 168 each 0 Unknown at Unknown time     semaglutide (OZEMPIC) 1 MG/DOSE pen Inject 1 mg Subcutaneous every 7 days 9 mL 3 5/21/2020     VENTOLIN  (90 Base) MCG/ACT inhaler inhale 2 puffs every 6 hours as needed for shortness of breath 18 g 3 Unknown at Unknown time        Medication Dispense History (from 5/27/2019 to 5/25/2020)   Expand All  Collapse All   ARIPiprazole      Dispensed  Days Supply  Quantity  Provider  Pharmacy    ARIPIPRAZOLE TAB 30MG  05/06/2020  30  30 each   [place] [facility]...    ARIPIPRAZOLE TAB 30MG  04/06/2020  30  30 each   [facility]...    ARIPIPRAZOLE TAB 30MG  03/06/2020  30  30 each   [place] [facility]...    ARIPIPRAZOLE TAB 30MG  02/05/2020  30  30 each   [place] [facility]...    ARIPIPRAZOLE TAB 30MG  01/07/2020  30  30 each   [place] [facility]...    ARIPIPRAZOLE TAB 30MG  12/07/2019  30  30 tablet   [place] [facility]...    Aripiprazole 30 Mg Tablet  11/09/2019  30  30 each  Kaylyn Blanton  [facility]...    Aripiprazole 30 Mg Tablet  10/10/2019  30  30 each  Patrica Rivero ([facility])  [place] [facility]...    Aripiprazole 30 Mg Tablet  09/10/2019  30  30 each  Patrica Rivero ([facility])  [facility]...    Aripiprazole 30 Mg Tablet  08/12/2019  30  30 each  [facility] Pat[facility] ([facility])  [place] [facility]...    Aripiprazole 30 Mg Tablet  07/12/2019  30  30 each  Mat Patrica ([facility])  [facility]...    Aripiprazole 30 Mg Tablet  06/12/2019  30  30 each  Patrica Rivero ([facility])  [facility]...          Albuterol Sulfate      Dispensed  Days Supply  Quantity  Provider  Pharmacy    Ventolin Hfa 90mcg Aer  05/06/2020  25  18 g  FORMASHVIN LEÓNGA  [facility]...    Ventolin Hfa 90mcg Aer  04/06/2020  25  18 g  FRANKY WASHINGTON  [facility]...    Ventolin Hfa 90mcg Aer  03/06/2020  25  18 g  FORMOGEY,FRANKY  [facility]...    Ventolin Hfa 90mcg Aer  02/10/2020  25  18 g  FORMOGEY,FRANKY  [facility]...    Ventolin Hfa 90mcg Aer  01/20/2020  25  18 g  FORMOGEY,FRANKY  [facility]...          Amoxicillin      Dispensed  Days Supply  Quantity  Provider  Pharmacy    AMOXICILLIN  CAP 500MG  05/22/2020  7  14 each   [facility]...          Atorvastatin Calcium      Dispensed  Days Supply  Quantity  Provider  Pharmacy    ATORVASTATIN TAB 10MG  05/06/2020  30  30 each   [place] [facility]...    ATORVASTATIN TAB 10MG  04/06/2020  30  30 each   [place] [facility]...    ATORVASTATIN TAB 10MG  03/06/2020  30  30 each   [place] [facility]...    ATORVASTATIN TAB 10MG  02/05/2020  30  30 each   [place] [facility]...    ATORVASTATIN TAB 10MG  01/07/2020  30  30 each   [facility]...    ATORVASTATIN TAB 10MG  12/07/2019  30  30 tablet   [facility]...    Atorvastatin 10 Mg Tablet  11/09/2019  30  30 each  Formogey, Franky  [place] [facility]...    Atorvastatin 10 Mg Tablet  10/10/2019  30  30 each  Formogey, Franky  [facility]...    Atorvastatin 10 Mg Tablet  09/10/2019  30  30 each  Formogey, Franky  [place] [facility]...    Atorvastatin 10 Mg Tablet  08/12/2019  30  30 each  Formogey, Franky  [facility]...    Atorvastatin 10 Mg Tablet  07/13/2019  30  30 each  Formogey, Franky  [facility]...          Carvedilol      Dispensed  Days Supply  Quantity  Provider  Pharmacy    CARVEDILOL   TAB 6.25MG  05/06/2020  30  90 each   [facility]...    CARVEDILOL   TAB 6.25MG  04/06/2020  30  90 each   [facility]...    CARVEDILOL   TAB 6.25MG  03/06/2020  30  90 each   [place] [facility]...    CARVEDILOL   TAB 6.25MG  01/07/2020  30  90 each   [place] [facility]...    CARVEDILOL   TAB 6.25MG  12/07/2019  30  90 tablet   Anders Romero  [facility]...    Carvedilol 6.25 Mg Tablet  11/09/2019  30  90 each  Formogey, Jaleesa  [place] [facility]...    Carvedilol 6.25 Mg Tablet  10/10/2019  30  90 each  Formogey, Jaleesa  [facility]...    Carvedilol 6.25 Mg Tablet  09/10/2019  30  90 each  Formogey, Jaleesa  [place] [facility]...    Carvedilol 6.25 Mg Tablet  08/12/2019  30  90 each  Formogey, Jaleesa  [facility]...    Carvedilol 6.25 Mg Tablet  07/12/2019  30  90 each  Formogey, Jaleesa  [facility]...    Carvedilol 6.25 Mg Tablet  06/12/2019  30  90 each  Formogey, Jaleesa  [place] [facility]...          Cholecalciferol      Dispensed  Days Supply  Quantity  Provider  Pharmacy    Vitamin D 1000u Tab  09/10/2019  30  30 each  -[facility] Patrica ([facility])  [facility]...    Vitamin D 1000u Tab  08/12/2019  30  30 each  -Good Patrica ([facility]Couns)  [place] [facility]...    Vitamin D 1000u Tab  07/12/2019  30  30 each  -[facility] Patrica (cor[facility]Couns)  [place] [facility]...    Vitamin D 1000u Tab  06/12/2019  30  30 each  -[facility] Patrica (cor[facility]Couns)  [facility]...          Continuous Blood Gluc       Dispensed  Days Supply  Quantity  Provider  Pharmacy    FREESTYLE BRIANNA 14 DAY READER  08/12/2019  30  1 each  [facility]          Continuous Blood Gluc Sensor      Dispensed  Days Supply  Quantity  Provider  Pharmacy    FREESTYLE BRIANNA 14 DAY SENSOR  09/30/2019  28  2 kit  [facility] [place]...    FREESTYLE BRIANNA 14 DAY SENSOR  08/12/2019  28  2 [facility] [place]...          FLUoxetine HCl      Dispensed  Days Supply  Quantity  Provider  Pharmacy    FLUOXETINE   CAP 20MG  05/06/2020  30  30 each   [facility]...    FLUOXETINE   CAP 20MG  04/06/2020  30  30 each   [facility] Th[facility]...    FLUOXETINE   CAP 20MG  03/06/2020  30  30 each   [facility]...    FLUOXETINE   CAP 20MG  02/05/2020  30  30 each   Anders Romero  [facility]...    FLUOXETINE   CAP 20MG  01/07/2020  30  30 each   [facility]...    FLUOXETINE   CAP 20MG  12/07/2019  30  30 capsule   [place] [facility]...    Fluoxetine 20mg Cap  11/09/2019  30  30 each  Kaylyn Blanton  [place] [facility]...    Fluoxetine 20mg Cap  10/10/2019  30  30 each  [facility])  [facility]...    Fluoxetine 20mg Cap  09/10/2019  30  30 each  [facility] Patrica (firstlight)  [facility]...    Fluoxetine 20mg Cap  08/12/2019  30  30 each  Good, Patrica (firstlight)  [facility]...    Fluoxetine 20mg Cap  07/12/2019  30  30 each  Good, Patrica (firstlight)  [facility]...    Fluoxetine 20mg Cap  06/12/2019  30  30 each  Patrica Rivero ([facility])  [facility]...          Glucose Blood      Dispensed  Days Supply  Quantity  Provider  Pharmacy    Accu-Chek Guide Str  05/06/2020  34  100  FORMOGEY,JALEESA  [place] [facility]...    Accu-Chek Guide Str  04/06/2020  34  100  FORMOGEY,JALEESA  [facility]...    Accu-Chek Guide Str  03/06/2020  34  100  FORMOGEY,JALEESA  [place] [facility]...    Accu-Chek Guide Str  02/05/2020  34  100  FORMOGEY,JALEESA  [facility]...    Accu-Chek Guide Str  01/07/2020  33  100  FORMOGEY,JALEESA  [facility]...    Accu-Chek Guide Str  12/07/2019  34  100  FORMOGEY,JALEESA  [place] [facility]...    Accu-Chek Guide Str  11/09/2019  30  100 each  Formogey, Jaleesa  [place] [facility]...    Accu-Chek Guide Str  10/15/2019  34  100 each  Formogey, Jaleesa  [place] [facility]...    Accu-Chek Guide Str  09/10/2019  50  50 each  Formogey, Jaleesa  [facility]...    Accu-Chek Guide Str  08/12/2019  33  100 each  Jaleesa Velasquez...    Accu-Chek Haylee + Leidy  07/11/2019  30  100 each  Jaleesa Velasquez...    Accu-Chek Haylee + Leidy  06/12/2019  30  100 each  Jaleesa Velasquez...          HYDROcodone-Acetaminophen       Dispensed  Days Supply  Quantity  Provider  Pharmacy    HYDROCO/APAP TAB 5-325MG  05/22/2020  4  24 each   [facility]...          Insulin Glargine      Dispensed  Days Supply  Quantity  Provider  Pharmacy    Basaglar Kwikpen 100unit/ Sop  05/14/2020  60  15 mL  FORMOGEY,FRANKY [place] [facility]...    Lantus Solostar 100unit/ Sol  01/22/2020  60  15 mL  FORMOGEY,FRANKY [place] [facility]...    Basaglar Kwikpen 100unit/ Sop  11/11/2019  60  15 mL  Formogey, Franky [facility]...    Basaglar Kwikpen 100unit/ Sop  08/02/2019  60  15 mL  FORMOGEY,FRANKY [facility]...          Insulin Pen Needle      Dispensed  Days Supply  Quantity  Provider  Pharmacy    PEN NEEDLES  MIS [id number]  02/17/2020  100  100 each   [facility]...    Ulticare Mini Pen 6mm  11/19/2019  100  100 each  [facility]as[facility]...    Ulticare Mini Pen 6mm  08/02/2019  100  100 each  [facility]...          Lancets      Dispensed  Days Supply  Quantity  Provider  Pharmacy    Accu-Chek Fastclix Mis  05/06/2020  34  102  [facility]ELAS[facility]...    Accu-Chek Fastclix Mis  04/06/2020  30  102  [facility]...    Accu-Chek Fastclix Mis  03/06/2020  34  102  [facility] VELASQUEZ [facility]...    Accu-Chek Fastclix Mis  02/05/2020  34  100  FORM[facility]ANKY LEÓN [facility]...    Accu-Chek Fastclix Mis  01/07/2020  33  100  [facility]EL[facility]...    Accu-Chek Fastclix Mis  12/07/2019  34  100  [facility] VELASQUEZ [facility]...    Accu-Chek Fastclix Mis  11/09/2019  34  100 each  [facility]as[facility]...    Accu-Chek Fastclix Mis  10/11/2019  34  100 each  Franky Velasquez...    Accu-Chek Fastclix Mis  09/10/2019  34  100 each  Franky Velasquez...          Lisinopril      Dispensed  Days Supply  Quantity  Provider  Pharmacy     LISINOPRIL   TAB 5MG  05/21/2020  17  17 each   [place] [facility]...    LISINOPRIL   TAB 10MG  05/06/2020  30  30 each   [facility]...    LISINOPRIL   TAB 10MG  04/06/2020  30  30 each   [facility]...    LISINOPRIL   TAB 10MG  03/06/2020  30  30 each   [facility]...    LISINOPRIL   TAB 10MG  02/05/2020  30  30 each   [facility]...    LISINOPRIL   TAB 10MG  01/07/2020  30  30 each   [place] [facility]...    LISINOPRIL   TAB 10MG  12/07/2019  30  30 tablet   [place] [facility]...    Lisinopril 10mg Tab  11/09/2019  30  30 each  Formogey, Franky  [facility]...    Lisinopril 10mg Tab  10/10/2019  30  30 each  Formogey, Franky  [place] [facility]...    Lisinopril 10mg Tab  09/10/2019  30  30 each  Formogey, Franky  [facility]...    Lisinopril 10mg Tab  08/12/2019  30  30 each  Formogey, Franky  [facility]...    Lisinopril 10mg Tab  07/12/2019  30  30 each  Formogey, Franky  [place] [facility]...    Lisinopril 10mg Tab  06/13/2019  30  30 each  Formogey, Franky  [place] [facility]...          Loratadine      Dispensed  Days Supply  Quantity  Provider  Pharmacy    LORATADINE 10 MG TABLET  05/06/2020  30  30 tablet  FORMOGEY,NOT PROVIDED  [place] [facility]...    Loratadine 10 Mg Tablet  04/06/2020  30  30  FORMOGEY,FRANKY  [facility]...    Loratadine 10 Mg Tablet  03/06/2020  30  30  FORMOGEY,FRANKY  [facility]...    LORATADINE 10 MG TABLET  02/05/2020  30  30 tablet  FORMOGEY,NOT PROVIDED  [facility]...    LORATADINE 10 MG TABLET  01/07/2020  30  30 tablet  FORMOGEY,NOT PROVIDED  [place] [facility]...    LORATADINE 10 MG TABLET  12/07/2019  30  30 tablet  FORMOGESAURAV,NOT PROVIDED  [facility]...    Loratadine 10 Mg Tablet  11/12/2019  30  30 each  Franky Velasquez...    Loratadine 10 Mg Tablet  10/10/2019  30  30 each  Franky Velasquez  [facility]...    Loratadine 10mg tab  09/10/2019  30  30 each  Formogey, Jaleesa  [place] [facility]...    Loratadine 10mg tab  08/12/2019  30  30 each  Formogey, Jaleesa  [place] [facility]...    Loratadine 10mg tab  07/12/2019  30  30 each  Formogey, Jaleesa  [facility]...    Loratadine 10mg tab  06/12/2019  30  30 each  Formogey, Jaleesa  [facility]...          Montelukast Sodium      Dispensed  Days Supply  Quantity  Provider  Pharmacy    MONTELUKAST  TAB 10MG  05/06/2020  30  30 each   [facility]...    MONTELUKAST  TAB 10MG  04/06/2020  30  30 each   [place] [facility]...    MONTELUKAST  TAB 10MG  03/06/2020  30  30 each   [facility]...    MONTELUKAST  TAB 10MG  02/05/2020  30  30 each   [facility]...    MONTELUKAST  TAB 10MG  01/07/2020  30  30 each   [facility]...    MONTELUKAST  TAB 10MG  12/07/2019  30  30 tablet   [place] [facility]...    Montelukast 10mg Tab  11/09/2019  30  30 each  Formogey, Jaleesa  [facility]...    Montelukast 10mg Tab  10/10/2019  30  30 each  Formogey, Jaleesa  [place] [facility]...    Montelukast Sod 10 Mg Tablet  09/10/2019  30  30 each  Formogey, Jaleesa  [facility]...    Montelukast Sod 10 Mg Tablet  08/12/2019  30  30 each  Formogey, Jaleesa  [place] [facility]...    Montelukast Sod 10 Mg Tablet  07/12/2019  30  30 each  Formogey, Jaleesa  [place] [facility]...    Montelukast Sod 10 Mg Tablet  06/12/2019  30  30 each  Formogey, Jaleesa  [place] [facility]...          Nicotine      Dispensed  Days Supply  Quantity  Provider  Pharmacy    Nicotrol 10mg/Crt Inh  07/03/2019  16  168 each  Formogey, Jaleesa  [facility]...          Omeprazole      Dispensed  Days Supply  Quantity  Provider  Pharmacy    OMEPRAZOLE   CAP 20MG  05/06/2020  30  30 each   [facility]...    OMEPRAZOLE   CAP 20MG  04/06/2020  30  30 each   [facility]...    OMEPRAZOLE   CAP 20MG   03/06/2020  30  30 each   [place] [facility]...    OMEPRAZOLE   CAP 20MG  02/05/2020  30  30 each   [place] [facility]...    OMEPRAZOLE   CAP 20MG  01/07/2020  30  30 each   [place] [facility]...    OMEPRAZOLE   CAP 20MG  12/26/2019  10  10 capsule   [place] [facility]...    Omeprazole Dr 20 Mg Capsule  09/10/2019  30  30 each  Formogey, Franky [facility]...    Omeprazole Dr 20 Mg Capsule  08/12/2019  30  30 each  Formogey, Franky  [facility]...    Omeprazole Dr 20 Mg Capsule  07/12/2019  30  30 each  Formogey, Franky  [place] [facility]...    Omeprazole Dr 20 Mg Capsule  06/12/2019  30  30 each  Formogey, Franky  [facility]...          Semaglutide      Dispensed  Days Supply  Quantity  Provider  Pharmacy    Ozempic 1 Mg Dose Pen  05/14/2020  28  3 mL  FORMOGEY,FRANKY  [facility]...    Ozempic 1 Mg Dose Pen  04/21/2020  28  3 mL  FORMOGEY,FRANKY  [place] [facility]...    Ozempic 1 Mg Dose Pen  03/25/2020  28  3 mL  FORMOGEY,FRANKY  [place] [facility]...    Ozempic 1 Mg Dose Pen  02/22/2020  28  3 mL  FORMOGEY,FRANKY  [facility]...    Ozempic 1 Mg Dose Pen  01/31/2020  28  3 mL  FORMOGEY,FRANKY  [facility]...    Ozempic 1 Mg Dose Pen  12/27/2019  28  3 mL  FORMOGEY,FRANKY  [facility]...    Ozempic 1 Mg Dose Pen  11/30/2019  28  3 mL  Formogey, Franky  [facility]...    Ozempic 1 Mg Dose Pen  10/26/2019  28  3 mL  FORMOGEY,FRANKY  [place] [facility]...    Ozempic 1 Mg Dose Pen  10/04/2019  28  3 mL  FORMOGEY,FRANKY  [facility]...    Ozempic 1 Mg Dose Pen  09/05/2019  28  3 mL  FORMOGEY,FRANKY  [facility]...    Ozempic 1 Mg Dose Pen  08/02/2019  28  3 mL  FRANKY VELASQUEZ...    Ozempic 1 Mg Dose Pen  07/01/2019  28  3 mL  Franky Velasquez...          amLODIPine Besylate      Dispensed  Days Supply  Quantity  Provider  Pharmacy    AMLODIPINE   TAB 5MG   05/06/2020  30  60 each   [place] [facility]...    AMLODIPINE   TAB 5MG  04/06/2020  30  60 each   [place] [facility]...    AMLODIPINE   TAB 5MG  03/06/2020  30  60 each   [facility]...    AMLODIPINE   TAB 5MG  02/05/2020  30  60 each   [facility]...    AMLODIPINE   TAB 5MG  01/07/2020  30  60 each   [facility]...    AMLODIPINE   TAB 5MG  12/07/2019  30  60 tablet   [place] [facility]...    Amlodipine 5 Mg Tab  11/09/2019  30  60 each  Formogey, Jaleesa  [facility]...    Amlodipine 5 Mg Tab  10/10/2019  30  60 each  Formogey, Jaleesa  [facility]...    Amlodipine 5 Mg Tab  09/10/2019  30  60 each  Formogey, Jaleesa  [place] [facility]...    Amlodipine 5 Mg Tab  08/12/2019  30  60 each  Formogey, Jaleesa  [place] [facility]...    Amlodipine 5 Mg Tab  07/12/2019  30  60 each  Formogey, Jaleesa  [facility]...    Amlodipine 5 Mg Tab  06/13/2019  30  60 each  Formogey, Jaleesa  [facility]...          cloZAPine      Dispensed  Days Supply  Quantity  Provider  Pharmacy    CLOZAPINE    TAB 100MG  05/06/2020  30  90 each   [facility]...    CLOZAPINE    TAB 200MG  04/06/2020  30  60 each   [facility]...    CLOZAPINE    TAB 200MG  03/06/2020  30  60 each   [facility]...    CLOZAPINE    TAB 200MG  02/05/2020  30  60 each   [place] [facility]...    CLOZAPINE    TAB 200MG  01/07/2020  30  60 each   [facility]...    CLOZAPINE    TAB 200MG  12/07/2019  30  60 tablet   [place] [facility]...    Clozapine 200 Mg Tablet  11/13/2019  30  60 each  [facility]    Clozapine 200 Mg Tablet  11/04/2019  11  22 each  [facility]...    Clozapine 200 Mg Tablet  08/12/2019  30  60 each  [facility]  [place] [facility]...    Clozapine 200 Mg Tablet  07/12/2019  30  60 each  Patrica Rivero ([facility])  [facility]..     Clozapine 200 Mg Tablet  06/12/2019  30  60 each  Patrica Rivero ([facility])  [facility]...          lamoTRIgine      Dispensed  Days Supply  Quantity  Provider  Pharmacy    LAMOTRIGINE  TAB 100MG  05/06/2020  30  30 each   [place] [facility]...    LAMOTRIGINE  TAB 100MG  04/06/2020  30  30 each   Anders Thrifty Whit...    LAMOTRIGINE  TAB 100MG  03/06/2020  30  30 each   [facility]...    LAMOTRIGINE  TAB 100MG  02/05/2020  30  30 each   Anders Thrifty Whit...    LAMOTRIGINE  TAB 100MG  01/07/2020  30  30 each   Anders Thrifty Whit...    LAMOTRIGINE  TAB 100MG  12/07/2019  30  30 tablet   [facility]...    Lamotrigine 100 Mg Tablet  11/09/2019  30  30 each  Kaylyn Blanton  Anders Thrifty Whit...    Lamotrigine 100 Mg Tablet  10/10/2019  30  30 each  Patrica Rivero ([facility])  Anders Thrifty Whit...    Lamotrigine 100 Mg Tablet  09/10/2019  30  30 each  Patrica Rivero (firstlight)  [facility]...    Lamotrigine 100 Mg Tablet  08/12/2019  30  30 each  Patrica Rivero (firstlight)  [facility]...    Lamotrigine 100 Mg Tablet  07/12/2019  30  30 each  Patrica Rivero (firstlight)  [facility]...    Lamotrigine 100 Mg Tablet  06/12/2019  30  30 each  Patrica Rivero (firstlight)  [facility]...          raNITIdine HCl      Dispensed  Days Supply  Quantity  Provider  Pharmacy    RANITIDINE   TAB 300MG  04/06/2020  30  30 each   [facility]...    RANITIDINE   TAB 300MG  03/06/2020  30  30 each   [facility]...    RANITIDINE   TAB 300MG  02/05/2020  30  30 each   [facility]...    RANITIDINE   TAB 300MG  01/07/2020  30  30 each   [facility]...    RANITIDINE   TAB 300MG  12/07/2019  30  30 tablet   [facility]...    Ranitidine 300 Mg Tablet  11/09/2019  30  30 each  Jaleesa Velasquez...    Ranitidine 300 Mg Tablet  10/10/2019  30  30 each  Jaleesa Velasquez  [facility]...    Ranitidine 300 Mg Tablet  09/10/2019  30  30 each  Jaleesa Velasquez [facility]...    Ranitidine 300 Mg Tablet  08/12/2019  30  30 each  Jaleesa Velasquez [facility]...    Ranitidine 300 Mg Tablet  07/12/2019  30  30 each  Jaleesa Velasquez...    Ranitidine 300mg Tab  06/12/2019  30  30 each  Jaleesa Velasquez [facility]...
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
## 2020-05-26 NOTE — H&P
[facility]    History and Physical  Medical Services       Date of Admission:  5/26/2020  Date of Service (when I saw the patient): 05/26/20    Assessment & Plan     Principal Problem:    Suicidal behavior    Active Medical Problems:    Esophageal reflux- pt reports taking omeprazole and denies reflux symptoms. Will order home dose of omeprazole.    Essential hypertension- pt reports she takes lisinopril, coreg and norvasc for blood pressure control. She states she can't remember the dose. Will order home doses of bp meds after dose is confirmed. BP stable. Pt denies chest pain, sob    Stage 3 chronic kidney disease (H)- Creatinine 5/25/20 was 1.43. Pt was treated for hyperkalemia in ED. K+ level was 6.1. CMP ordered for morning. Will continue to monitor.     Type 2 diabetes mellitus with stage 3 chronic kidney disease, with long-term current use of insulin (H)- last A1c 7.8 on 5/18/20. Pt reports she takes lantus 25 units once a day and Ozempic every Thursday. Ordered blood glucose monitoring qid and bedtime. Moderate consistent carb diet. Will order diabetic home meds once confirmed.      Mild intermittent asthma with acute exacerbation- pt reports she uses albuterol inhaler as needed for wheezing. Pt reports she has not needed to use it in months. Denies chest pain, sob, difficulty breathing. Albuterol neb tx ordered prn.     Leukocytosis- pt has hx of chronic leukocytosis. wbc on admission was 21.7.  Pt states she was told this is from Clozaril.  Discussed with Pmhnp.      covid screen- negative          Code Status: Full Code    Isabel Amato CNP    Primary Care Physician   Jaleesa Velasquez    Chief Complaint   Psych evaluation     History is obtained from the patient and medical chart     History of Present Illness   (per medical chart) Divina Delgadillo is a 35 year old female who presents by EMS [place] for concern for intentional drug ingestion and overdose.  History was limited  "directly from the patient initially on arrival.  EMS report patient drank at  least 354 mL bottle of NyQuil and was drinking a second bottle when an attendant at the gas station called 911.  EMS report they gave 2 mg of IV Narcan.  EMS reports she recently had dental work \"teeth pulled and may have gotten vicodin\".  Patient arrived for further assessment and care.  On arrival she was sleepy, GCS was 15 upon arival (after naloxone).  Patient reports she lives in [facility] with her foster mom - \"L[place] in [facility]\".  Patient reports taking Vicodin and NyQuil because she \"could not stop the voices in her head\".  Patient reports \"a scary man tells me to hurt myself\". She reports she has never been hospitalized for poisoning or drug overdose.  Medical records show a diagnosis of history of hypertension, stage III chronic kidney disease, type 2 diabetes,  bipolar disorder, esophageal reflux, morbid obesity.  Medical records show that patient is prescribed Norvasc, Lipitor, Coreg, famotidine, insulin, lisinopril, Abilify, Clozaril, Prozac and Lamictal.  Patient reports she took vicodin around 10am.    Past Medical History    I have reviewed this patient's medical history and updated it with pertinent information if needed.   Past Medical History:   Diagnosis Date     Cervical high risk HPV (human papillomavirus) test positive 6/20/2017     Depressive disorder, not elsewhere classified      DVT (deep venous thrombosis) (H)      Dysmetabolic syndrome X      Esophageal reflux     GERD     Mild intermittent asthma      Other abnormal heart sounds     Heart murmur     Other specified types of schizophrenia, unspecified condition      Pancreatic cancer (H)     left adrenal gland, partial pancreas, and spleen removed; no chemo or radiation.      Type II or unspecified type diabetes mellitus without mention of complication, not stated as uncontrolled      Unspecified disorder of tympanic membrane        Past Surgical History "   I have reviewed this patient's surgical history and updated it with pertinent information if needed.  Past Surgical History:   Procedure Laterality Date     ADRENALECTOMY       IR LIVER BIOPSY PERCUTANEOUS  11/14/2019     PANCREATECTOMY PARTIAL       PERCUTANEOUS BIOPSY LIVER Right 11/14/2019    Procedure: Percutaneous Liver Biopsy;  Surgeon: Sean Guzman PA-C;  Location: [facility] OR     SPLENECTOMY       SURGICAL HISTORY OF -   10/1/2000    Tympanoplasty     SURGICAL HISTORY OF -   10/1/2000    Tonsillectomy       Prior to Admission Medications   Prior to Admission Medications   Prescriptions Last Dose Informant Patient Reported? Taking?   ARIPiprazole (ABILIFY) 30 MG tablet 5/25/2020 at Unknown time Self Yes Yes   Sig: Take 30 mg by mouth daily   FLUoxetine (PROZAC) 20 MG capsule  Self Yes No   Sig: Take 20 mg by mouth daily   VENTOLIN  (90 Base) MCG/ACT inhaler   No No   Sig: inhale 2 puffs every 6 hours as needed for shortness of breath   Patient not taking: Reported on 5/21/2020   VITAMIN D3 25 MCG (1000 UT) tablet   Yes No   Sig: Take 1 tablet by mouth daily   amLODIPine (NORVASC) 5 MG tablet 5/25/2020 at Unknown time  Yes Yes   Sig: Take 5 mg by mouth daily   atorvastatin (LIPITOR) 10 MG tablet 5/25/2020 at Unknown time  No Yes   Sig: Take 1 tablet (10 mg) by mouth daily   blood glucose (ACCU-CHEK GUIDE) test strip   No No   Sig: Use to test blood sugar 3 times daily (accu chek GUIDE).   blood glucose monitoring (ACCU-CHEK FASTCLIX) lancets   No No   Sig: Use to test blood sugar 3 times daily   cloZAPine (CLOZARIL) 100 MG tablet  Self Yes No   Sig: Take 150 mg by mouth 2 times daily    lamoTRIgine (LAMICTAL) 100 MG tablet   Yes No   Sig: Take 100 mg by mouth At Bedtime   lisinopril (ZESTRIL) 5 MG tablet   No No   Sig: Take 1 tablet (5 mg) by mouth daily   loratadine (CLARITIN) 10 MG tablet   No No   Sig: Take 1 tablet (10 mg) by mouth every morning   montelukast (SINGULAIR) 10 MG tablet   No  No   Sig: Take 1 tablet (10 mg) by mouth At Bedtime   nicotine (NICOTROL) 10 MG inhaler   No No   Sig: Inhale 6-10 Cartridges into the lungs daily as needed for smoking cessation   Patient not taking: Reported on 1/20/2020   omeprazole (PRILOSEC) 20 MG DR capsule   No No   Sig: Take 1 capsule (20 mg) by mouth daily   ranitidine (ZANTAC) 300 MG tablet   Yes No   Sig: Take 300 mg by mouth daily   semaglutide (OZEMPIC) 1 MG/DOSE pen   No No   Sig: Inject 1 mg Subcutaneous every 7 days      Facility-Administered Medications: None     Allergies   Allergies   Allergen Reactions     Acetaminophen Other (See Comments)     pt previously tried to overdose on it, PMD does not want pt taking per pt.      Latex Rash       Social History   I have reviewed this patient's social history and updated it with pertinent information if needed. Divina BOLTON Leona  reports that she has been smoking cigarettes. She has a 4.00 pack-year smoking history. She has never used smokeless tobacco. She reports that she does not drink alcohol or use drugs.    Family History   I have reviewed this patient's family history and updated it with pertinent information if needed.   Family History   Problem Relation Age of Onset     Respiratory Mother         ASTHMA     Allergies Mother      Depression Mother      Heart Disease Father         HEART VALVE REPLACEMENT     Hypertension Father      Diabetes Father      Depression Father      Respiratory Brother      Allergies Brother      Respiratory Sister      Allergies Sister      Depression Sister      Respiratory Sister      Allergies Sister      Depression Sister      Kidney Disease No family hx of        Review of Systems   CONSTITUTIONAL:  negative  EYES:  negative  HEENT:  negative  RESPIRATORY:  negative  CARDIOVASCULAR:  negative  GASTROINTESTINAL:  negative  GENITOURINARY:  negative  INTEGUMENT/BREAST:  negative  HEMATOLOGIC/LYMPHATIC:  negative  ALLERGIC/IMMUNOLOGIC:  negative  ENDOCRINE:   negative  MUSCULOSKELETAL:  negative  NEUROLOGICAL:  negative    Physical Exam   Temp: 98.7  F (37.1  C) Temp src: Tympanic BP: 109/65 Pulse: 90 Heart Rate: 83 Resp: 14 SpO2: 98 % O2 Device: None (Room air)    Vital Signs with Ranges  Temp:  [97.9  F (36.6  C)-98.7  F (37.1  C)] 98.7  F (37.1  C)  Pulse:  [82-94] 90  Heart Rate:  [83-95] 83  Resp:  [10-20] 14  BP: (103-153)/(50-76) 109/65  SpO2:  [92 %-98 %] 98 %  176 lbs 6.4 oz    Constitutional: NAD, vitals stable, appears well  HEENT: atraumatic, normocephalic, PERRLA, normal ears bilateral, nose normal, several dental caries noted, no thyromegaly, no lymphadenopathy   Respiratory: CTA bilaterally, no rales, no rhonchi, no crackles, no wheezes, symmetric rise and fall of chest, good effort   Cardiovascular: RRR, S1, S2, no extra heart sounds, no murmurs, no edema   GI: normal inspection , normoactive bowel sounds x 4, no tenderness, no guarding, no masses, no organomegaly   Lymph/Hematologic: no cervical lymphadenopathy   Genitourinary: deferred   Skin: warm, dry, intact, no rashes, no open wounds, no cyanosis   Musculoskeletal: FROM, gait normal   Neurologic: alert and oriented   Psychiatric: tearful, calm, cooperative     Data   Data reviewed today:   Recent Labs   Lab 05/25/20  1703 05/25/20  1424 05/25/20  1330 05/21/20  1018   WBC  --   --  21.7*  --    HGB  --   --  12.2  --    MCV  --   --  97  --    PLT  --   --  357  --      --  137 137   POTASSIUM 4.3 6.1* 5.9* 5.1   CHLORIDE 110*  --  109 109   CO2 26  --  21 24   BUN 25  --  26 31*   CR 1.43*  --  1.63* 1.48*   ANIONGAP 5  --  7 4   CLAY 9.1  --  8.3* 9.2   *  --  247* 143*   ALBUMIN  --   --  3.2*  --    PROTTOTAL  --   --  7.5  --    BILITOTAL  --   --  0.3  --    ALKPHOS  --   --  209*  --    ALT  --   --  41  --    AST  --   --  22  --        No results found for this or any previous visit (from the past 24 hour(s)).

## 2020-05-26 NOTE — ED NOTES
Spoke with Liberty Granado [phone] from [facility], whom oversee Divina's Legal Guardianship. Liberty gave verbal consent to allow Divina to be admitted.

## 2020-05-26 NOTE — PHARMACY
Pharmacy Clozapine Initial Note    Date of Service: 2020  Patient's : 1985  35 year old, female    Recent ANC Value(s):  Recent Labs   Lab Test 20  1330 20  1440 20  1202 19  1040 19  1048 19  0838 10/17/19  0924   ANEU 16.4* 14.2* 9.4* 9.8* 11.4* 12.0* 10.1*       Is the patient enrolled in the clozapine REMS program? Yes  Ordering prescriber: Piyush Jarrett  Is this provider certified in the clozapine REMS program? Yes  Is the ANC within recommended limits? Yes  Does the patient have any signs or symptoms of infection, including fever or sore throat? No    Plan:  1. Initiate clozapine therapy at 150 mg PO BID.  2. A WBC with differential will be ordered at least weekly.  ANC values will be entered into the REMS program.  3. Signs/symptoms of infection will be monitored daily.    Jose Cummings, Pharm D.  Phone / Pager: [phone]
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
## 2020-05-26 NOTE — ED NOTES
Report given to Shelley at [facility]. Liberty with [facility]  updated on transfer.

## 2020-05-27 DIAGNOSIS — I10 ESSENTIAL HYPERTENSION: Primary | ICD-10-CM

## 2020-05-27 LAB
ALBUMIN SERPL-MCNC: 2.9 G/DL (ref 3.4–5)
ALP SERPL-CCNC: 193 U/L (ref 40–150)
ALT SERPL W P-5'-P-CCNC: 57 U/L (ref 0–50)
ANION GAP SERPL CALCULATED.3IONS-SCNC: 4 MMOL/L (ref 3–14)
AST SERPL W P-5'-P-CCNC: 38 U/L (ref 0–45)
BILIRUB SERPL-MCNC: 0.2 MG/DL (ref 0.2–1.3)
BUN SERPL-MCNC: 18 MG/DL (ref 7–30)
CALCIUM SERPL-MCNC: 8.4 MG/DL (ref 8.5–10.1)
CHLORIDE SERPL-SCNC: 113 MMOL/L (ref 94–109)
CO2 SERPL-SCNC: 25 MMOL/L (ref 20–32)
CREAT SERPL-MCNC: 1.18 MG/DL (ref 0.52–1.04)
GFR SERPL CREATININE-BSD FRML MDRD: 60 ML/MIN/{1.73_M2}
GLUCOSE BLDC GLUCOMTR-MCNC: 100 MG/DL (ref 70–99)
GLUCOSE BLDC GLUCOMTR-MCNC: 135 MG/DL (ref 70–99)
GLUCOSE BLDC GLUCOMTR-MCNC: 138 MG/DL (ref 70–99)
GLUCOSE BLDC GLUCOMTR-MCNC: 158 MG/DL (ref 70–99)
GLUCOSE BLDC GLUCOMTR-MCNC: 98 MG/DL (ref 70–99)
GLUCOSE SERPL-MCNC: 131 MG/DL (ref 70–99)
POTASSIUM SERPL-SCNC: 4.4 MMOL/L (ref 3.4–5.3)
PROT SERPL-MCNC: 6.5 G/DL (ref 6.8–8.8)
SODIUM SERPL-SCNC: 142 MMOL/L (ref 133–144)

## 2020-05-27 PROCEDURE — 80053 COMPREHEN METABOLIC PANEL: CPT | Performed by: NURSE PRACTITIONER

## 2020-05-27 PROCEDURE — 36415 COLL VENOUS BLD VENIPUNCTURE: CPT | Performed by: NURSE PRACTITIONER

## 2020-05-27 PROCEDURE — 00000146 ZZHCL STATISTIC GLUCOSE BY METER IP

## 2020-05-27 PROCEDURE — 25000132 ZZH RX MED GY IP 250 OP 250 PS 637: Mod: GY | Performed by: NURSE PRACTITIONER

## 2020-05-27 PROCEDURE — 99232 SBSQ HOSP IP/OBS MODERATE 35: CPT | Performed by: NURSE PRACTITIONER

## 2020-05-27 PROCEDURE — 25000131 ZZH RX MED GY IP 250 OP 636 PS 637: Mod: GY | Performed by: NURSE PRACTITIONER

## 2020-05-27 PROCEDURE — 99232 SBSQ HOSP IP/OBS MODERATE 35: CPT | Mod: 95 | Performed by: NURSE PRACTITIONER

## 2020-05-27 PROCEDURE — 12400000 ZZH R&B MH

## 2020-05-27 RX ADMIN — AMOXICILLIN 500 MG: 500 CAPSULE ORAL at 08:18

## 2020-05-27 RX ADMIN — INSULIN GLARGINE 19 UNITS: 100 INJECTION, SOLUTION SUBCUTANEOUS at 08:22

## 2020-05-27 RX ADMIN — INSULIN ASPART 1 UNITS: 100 INJECTION, SOLUTION INTRAVENOUS; SUBCUTANEOUS at 12:33

## 2020-05-27 RX ADMIN — FLUOXETINE 20 MG: 20 CAPSULE ORAL at 08:19

## 2020-05-27 RX ADMIN — LISINOPRIL 5 MG: 5 TABLET ORAL at 08:19

## 2020-05-27 RX ADMIN — AMOXICILLIN 500 MG: 500 CAPSULE ORAL at 19:44

## 2020-05-27 RX ADMIN — NICOTINE 1 PATCH: 21 PATCH, EXTENDED RELEASE TRANSDERMAL at 08:56

## 2020-05-27 RX ADMIN — CARVEDILOL 6.25 MG: 6.25 TABLET, FILM COATED ORAL at 08:19

## 2020-05-27 RX ADMIN — LORATADINE 10 MG: 10 TABLET ORAL at 08:19

## 2020-05-27 RX ADMIN — CLOZAPINE 150 MG: 50 TABLET ORAL at 21:10

## 2020-05-27 RX ADMIN — ARIPIPRAZOLE 30 MG: 15 TABLET ORAL at 08:55

## 2020-05-27 RX ADMIN — CLOZAPINE 150 MG: 50 TABLET ORAL at 08:18

## 2020-05-27 RX ADMIN — ATORVASTATIN CALCIUM 10 MG: 10 TABLET, FILM COATED ORAL at 08:18

## 2020-05-27 RX ADMIN — CARVEDILOL 6.25 MG: 6.25 TABLET, FILM COATED ORAL at 21:10

## 2020-05-27 RX ADMIN — MONTELUKAST 10 MG: 10 TABLET, FILM COATED ORAL at 21:10

## 2020-05-27 RX ADMIN — AMLODIPINE BESYLATE 5 MG: 5 TABLET ORAL at 08:19

## 2020-05-27 RX ADMIN — OMEPRAZOLE 20 MG: 20 CAPSULE, DELAYED RELEASE ORAL at 06:17

## 2020-05-27 RX ADMIN — LAMOTRIGINE 100 MG: 100 TABLET ORAL at 21:11

## 2020-05-27 ASSESSMENT — ACTIVITIES OF DAILY LIVING (ADL)
DRESS: SCRUBS (BEHAVIORAL HEALTH);INDEPENDENT
ORAL_HYGIENE: INDEPENDENT
LAUNDRY: UNABLE TO COMPLETE
HYGIENE/GROOMING: INDEPENDENT
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
## 2020-05-27 NOTE — PROGRESS NOTES
"[facility]  Psychiatric Progress Note      Impression:     (per H&P) Divina Delgadillo is a 35 year old female who presented via [facility] brought in by EMS [place] for intentional drug ingestion and overdose. She reportedly drank a 354 mL bottle of NyQuil and was consuming the second bottle when the  called 911. She did receive IV Narcan when EMS arrived secondary to assessment noting she recently had dental work and may have gotten \"Vicodin.\" When she woke, she admitted to taking Vicodin and NyQuil because she could not stop the voices in her head. Endorsed hallucinations of a \"scary man\" telling her to harm herself. Later expressed that attempt was triggerd by feeling upset with her foster mother for not being allowed to go to [facility]. Divina does have a legal guardian, Liberty Granado @ [phone] from [facility] who gave consent to allow admission.     Patient reports feeling \"really tired\" today although slept good last night, likely secondary to pre-admission event.  She reports anxiety is \"better than yesterday\" and denies depressed mood.  She denies suicidal thoughts and denies auditory/visual hallucination today.  Patient remains appropriate and cooperative, though mainly isolating to here room.  Encourage patient to try and get out on open unit to attend groups and unit activities.  No medication changes today, all were reinstated yesterday at home doses.    Educated regarding medication indications, risks, benefits, side effects, contraindications and possible interactions. Verbally expressed understanding.        Diagnoses:   Schizoaffective Disorder, Bipolar Type  PTSD, Chronic  Borderline Personality Traits      Attestation:  Patient has been seen and evaluated by me,  Doris Ly, VERONIQUE CNP          Interim History:   The patient's care was discussed with the treatment team and chart notes were reviewed.    BEHAVIORAL TEAM " DISCUSSION    Progress: minimal  Continued Stay Criteria/Rationale: Continues to need stabilization, restarted medications and will determine if adjustments are needed  Medical/Physical: See H&P  Precautions:   Falls precaution?: YES  Behavioral Orders   Procedures     Code 1 - Restrict to Unit     Routine Programming     As clinically indicated     Status 15     Every 15 minutes.     Plan: Continue medications as prescribed, may need adjustments if not showing improvements  [facility]  Rationale for change in precautions or plan: no changes today      Participants: Doris Ly NP, Nursing          Medications:     Current Facility-Administered Medications Ordered in Epic   Medication Dose Route Frequency Last Rate Last Dose     albuterol (PROVENTIL) neb solution 2.5 mg  2.5 mg Nebulization Q6H PRN         alum & mag hydroxide-simethicone (MAALOX  ES) suspension 30 mL  30 mL Oral Q4H PRN         amLODIPine (NORVASC) tablet 5 mg  5 mg Oral Daily   5 mg at 05/27/20 0819     amoxicillin (AMOXIL) capsule 500 mg  500 mg Oral Q12H ANNE   500 mg at 05/27/20 0818     ARIPiprazole (ABILIFY) tablet 30 mg  30 mg Oral Daily   30 mg at 05/27/20 0855     atorvastatin (LIPITOR) tablet 10 mg  10 mg Oral Daily   10 mg at 05/27/20 0818     bisacodyl (DULCOLAX) Suppository 10 mg  10 mg Rectal Daily PRN         carvedilol (COREG) tablet 6.25 mg  6.25 mg Oral BID   6.25 mg at 05/27/20 0819     cloZAPine (CLOZARIL) tablet 150 mg  150 mg Oral BID   150 mg at 05/27/20 0818     glucose gel 15-30 g  15-30 g Oral Q15 Min PRN        Or     dextrose 50 % injection 25-50 mL  25-50 mL Intravenous Q15 Min PRN        Or     glucagon injection 1 mg  1 mg Subcutaneous Q15 Min PRN         FLUoxetine (PROzac) capsule 20 mg  20 mg Oral Daily   20 mg at 05/27/20 0819     hydrOXYzine (ATARAX) tablet 25-50 mg  25-50 mg Oral TID PRN   50 mg at 05/26/20 0913     insulin aspart (NovoLOG) injection (RAPID ACTING)  1-7  "Units Subcutaneous TID AC   1 Units at 05/27/20 1233     insulin aspart (NovoLOG) injection (RAPID ACTING)  1-5 Units Subcutaneous At Bedtime         insulin glargine (LANTUS PEN) injection 19 Units  19 Units Subcutaneous Daily   19 Units at 05/27/20 0822     lamoTRIgine (LaMICtal) tablet 100 mg  100 mg Oral At Bedtime   100 mg at 05/26/20 2144     lisinopril (ZESTRIL) tablet 5 mg  5 mg Oral Daily   5 mg at 05/27/20 0819     loratadine (CLARITIN) tablet 10 mg  10 mg Oral QAM   10 mg at 05/27/20 0819     magnesium hydroxide (MILK OF MAGNESIA) suspension 30 mL  30 mL Oral At Bedtime PRN         montelukast (SINGULAIR) tablet 10 mg  10 mg Oral At Bedtime   10 mg at 05/26/20 2144     nicotine (NICODERM CQ) 21 MG/24HR 24 hr patch 1 patch  1 patch Transdermal Daily   1 patch at 05/27/20 0856     nicotine Patch in Place   Transdermal Q8H   Stopped at 05/27/20 0412     OLANZapine (zyPREXA) tablet 5-10 mg  5-10 mg Oral BID PRN        Or     OLANZapine (zyPREXA) injection 5-10 mg  5-10 mg Intramuscular BID PRN         omeprazole (priLOSEC) CR capsule 20 mg  20 mg Oral QAM AC   20 mg at 05/27/20 0617     [START ON 5/28/2020] semaglutide (OZEMPIC) pen for injection 1 mg  1 mg Subcutaneous Q7 Days         No current Epic-ordered outpatient medications on file.              10 point ROS negative - see H&P       Allergies:     Allergies   Allergen Reactions     Acetaminophen Other (See Comments)     pt previously tried to overdose on it, PMD does not want pt taking per pt.      Latex Rash            Psychiatric Examination:   /58   Pulse 86   Temp 98.3  F (36.8  C) (Tympanic)   Resp 14   Ht 1.6 m (5' 3\")   Wt 80 kg (176 lb 6.4 oz)   SpO2 96%   BMI 31.25 kg/m    Weight is 176 lbs 6.4 oz  Body mass index is 31.25 kg/m .    Appearance:  adequately groomed and dressed in hospital scrubs - sleepy  Attitude:  cooperative  Eye Contact:  good  Mood:  better  Affect:  appropriate and in normal range  Speech:  clear, " "coherent  Psychomotor Behavior:  no evidence of tardive dyskinesia, dystonia, or tics  Thought Process:  goal oriented  Associations:  no loose associations  Thought Content:  no evidence of suicidal ideation or homicidal ideation and patient denies active psychosis  Insight:  limited  Judgment:  limited  Oriented to:  time, person, and place  Attention Span and Concentration:  intact  Recent and Remote Memory:  intact  Fund of Knowledge: appropriate  Muscle Strength and Tone: normal  Gait and Station: Normal           Labs:     Results for orders placed or performed during the hospital encounter of 05/26/20   Glucose by meter     Status: Abnormal   Result Value Ref Range    Glucose 172 (H) 70 - 99 mg/dL   Glucose by meter     Status: None   Result Value Ref Range    Glucose 90 70 - 99 mg/dL   Glucose by meter     Status: Abnormal   Result Value Ref Range    Glucose 173 (H) 70 - 99 mg/dL   Comprehensive metabolic panel     Status: Abnormal   Result Value Ref Range    Sodium 142 133 - 144 mmol/L    Potassium 4.4 3.4 - 5.3 mmol/L    Chloride 113 (H) 94 - 109 mmol/L    Carbon Dioxide 25 20 - 32 mmol/L    Anion Gap 4 3 - 14 mmol/L    Glucose 131 (H) 70 - 99 mg/dL    Urea Nitrogen 18 7 - 30 mg/dL    Creatinine 1.18 (H) 0.52 - 1.04 mg/dL    GFR Estimate 60 (L) >60 mL/min/[1.73_m2]    GFR Estimate If Black 70 >60 mL/min/[1.73_m2]    Calcium 8.4 (L) 8.5 - 10.1 mg/dL    Bilirubin Total 0.2 0.2 - 1.3 mg/dL    Albumin 2.9 (L) 3.4 - 5.0 g/dL    Protein Total 6.5 (L) 6.8 - 8.8 g/dL    Alkaline Phosphatase 193 (H) 40 - 150 U/L    ALT 57 (H) 0 - 50 U/L    AST 38 0 - 45 U/L   Glucose by meter     Status: Abnormal   Result Value Ref Range    Glucose 100 (H) 70 - 99 mg/dL   Glucose by meter     Status: Abnormal   Result Value Ref Range    Glucose 135 (H) 70 - 99 mg/dL     Divina Delgadillo is a 35 year old female who is being evaluated via a billable video visit.      The patient has been notified of following:     \"This video visit " "will be conducted via a call between you and your physician/provider. We have found that certain health care needs can be provided without the need for an in-person physical exam.  This service lets us provide the care you need with a video conversation.  If a prescription is necessary we can send it directly to your pharmacy.  If lab work is needed we can place an order for that and you can then stop by our lab to have the test done at a later time.    If during the course of the call the physician/provider feels a video visit is not appropriate, you will not be charged for this service.\"     Patient has given verbal consent for Video visit? Yes    Video-Visit Details  Video Start Time: 0943    Video End Time (time video stopped): 0949    Originating Location (pt. Location): [facility]    Distant Location (provider location):  [facility]     Mode of Communication:  Video Conference via [employer]                    "

## 2020-05-27 NOTE — TELEPHONE ENCOUNTER
"Requested Prescriptions   Pending Prescriptions Disp Refills     amLODIPine (NORVASC) 5 MG tablet [Pharmacy Med Name: amlodipine 5 mg tablet] 60 tablet 5     Sig: TAKE 2 TABLETS BY MOUTH EVERY DAY       Calcium Channel Blockers Protocol  Failed - 5/27/2020  8:39 AM        Failed - Normal serum creatinine on file in past 12 months     Recent Labs   Lab Test 05/27/20  0534  09/01/17  1049   CR 1.18*   < >  --    CREAT  --   --  1.6*    < > = values in this interval not displayed.       Ok to refill medication if creatinine is low          Passed - Blood pressure under 140/90 in past 12 months     BP Readings from Last 3 Encounters:   05/25/20 (!) 141/64   05/21/20 122/60   02/03/20 (!) 152/77                 Passed - Recent (12 mo) or future (30 days) visit within the authorizing provider's specialty     Patient has had an office visit with the authorizing provider or a provider within the authorizing providers department within the previous 12 mos or has a future within next 30 days. See \"Patient Info\" tab in inbasket, or \"Choose Columns\" in Meds & Orders section of the refill encounter.              Passed - Medication is active on med list        Passed - Patient is age 18 or older        Passed - No active pregnancy on record        Passed - No positive pregnancy test in past 12 months           Last Written Prescription Date:  5/26/20  Last Fill Quantity: 5,  # refills: 0   Last office visit: 5/21/2020 with prescribing provider:  Eva   Future Office Visit:            "

## 2020-05-27 NOTE — PROGRESS NOTES
[facility]    Medical Services Progress Note    Date of Service (when I saw the patient): 05/27/2020    Assessment & Plan     Principal Problem:    Suicidal behavior     Active Medical Problems:    Esophageal reflux- pt reports taking omeprazole and denies reflux symptoms. Will order home dose of omeprazole.      Essential hypertension- pt reports she takes lisinopril, coreg and norvasc for blood pressure control. She states she can't remember the dose. Will order home doses of bp meds after dose is confirmed. BP stable. Pt denies chest pain, sob  5/27/20- vitals are stable. Pt denies chest pain, sob. /58. Pt denies any acute complaints.      Stage 3 chronic kidney disease (H)- Creatinine 5/25/20 was 1.43. Pt was treated for hyperkalemia in ED. K+ level was 6.1. CMP ordered for morning. Will continue to monitor.   5/27/20- pt creatinine this am improving at 1.18. potasium level was normal at 4.4.       Type 2 diabetes mellitus with stage 3 chronic kidney disease, with long-term current use of insulin (H)- last A1c 7.8 on 5/18/20. Pt reports she takes lantus 25 units once a day and Ozempic every Thursday. Ordered blood glucose monitoring qid and bedtime. Moderate consistent carb diet. Will order diabetic home meds once confirmed.    5/27/20- blood glucose levels are stable and staying in the 100's. 100-172.       Mild intermittent asthma with acute exacerbation- pt reports she uses albuterol inhaler as needed for wheezing. Pt reports she has not needed to use it in months. Denies chest pain, sob, difficulty breathing. Albuterol neb tx ordered prn.   5/27/20- pt denies sob, chest pain, difficulty breathing.       Leukocytosis- pt has hx of chronic leukocytosis. wbc on admission was 21.7.  Pt states she was told this is from Clozaril.  Discussed with Pmhnp.       covid screen- negative     Code Status: Full Code.    Isabel Amato CNP        -Data reviewed today: I reviewed all new labs and imaging  results over the last 24 hours.     Physical Exam   Temp: 98.3  F (36.8  C) Temp src: Tympanic BP: 112/58 Pulse: 86   Resp: 14 SpO2: 96 % O2 Device: None (Room air)    Vitals:    05/26/20 0340   Weight: 80 kg (176 lb 6.4 oz)     Vital Signs with Ranges  Temp:  [98.3  F (36.8  C)-98.5  F (36.9  C)] 98.3  F (36.8  C)  Pulse:  [86-87] 86  Resp:  [14-16] 14  BP: (110-112)/(58-59) 112/58  SpO2:  [96 %] 96 %  No intake/output data recorded.    Constitutional: NAD, vitals stable, appears well  Respiratory: CTA bilaterally, no rales, no rhonchi, no wheezing, no crackles, symmetric rise and fall of chest   Cardiovascular: RRR, S1, S2, no extra heart sounds, no murmurs, no edema   GI: normal inspection, normoactive bowel sounds x 4, no tenderness, no guarding, no masses   Skin/Integumen: warm,dry,intact, no rashes,no open wounds, no cyanosis       Medications     amLODIPine  5 mg Oral Daily     amoxicillin  500 mg Oral Q12H ANNE     ARIPiprazole  30 mg Oral Daily     atorvastatin  10 mg Oral Daily     carvedilol  6.25 mg Oral BID     cloZAPine  150 mg Oral BID     FLUoxetine  20 mg Oral Daily     insulin aspart  1-7 Units Subcutaneous TID AC     insulin aspart  1-5 Units Subcutaneous At Bedtime     insulin glargine  19 Units Subcutaneous Daily     lamoTRIgine  100 mg Oral At Bedtime     lisinopril  5 mg Oral Daily     loratadine  10 mg Oral QAM     montelukast  10 mg Oral At Bedtime     nicotine  1 patch Transdermal Daily     nicotine   Transdermal Q8H     omeprazole  20 mg Oral QAM AC     [START ON 5/28/2020] semaglutide  1 mg Subcutaneous Q7 Days       Data   Recent Labs   Lab 05/27/20  0534 05/25/20  1703 05/25/20  1424 05/25/20  1330   WBC  --   --   --  21.7*   HGB  --   --   --  12.2   MCV  --   --   --  97   PLT  --   --   --  357    141  --  137   POTASSIUM 4.4 4.3 6.1* 5.9*   CHLORIDE 113* 110*  --  109   CO2 25 26  --  21   BUN 18 25  --  26   CR 1.18* 1.43*  --  1.63*   ANIONGAP 4 5  --  7   CLAY 8.4* 9.1  --   8.3*   * 228*  --  247*   ALBUMIN 2.9*  --   --  3.2*   PROTTOTAL 6.5*  --   --  7.5   BILITOTAL 0.2  --   --  0.3   ALKPHOS 193*  --   --  209*   ALT 57*  --   --  41   AST 38  --   --  22       No results found for this or any previous visit (from the past 24 hour(s)).

## 2020-05-27 NOTE — PLAN OF CARE
Problem: Adult Behavioral Health Plan of Care  Goal: Patient-Specific Goal (Individualization)  Description: Pt will follow recommendations of treatment team.  Pt will be compliant with medications.  Pt will sleep 6-8 hours each night.  Pt will attend 50 % of groups.    Pt attended some groups this shift. Pt did take a nap this morning stating that she is tired and needed to catch up on sleep. Pt denies depression, SI/HI, pain, hallucinations. Pt states her anxiety is getting better, rates it a 4/10.    Pt took a shower but states that she doesn't like showering in a strange place.   Outcome: Improving  Note:        Problem: Suicide Risk  Goal: Absence of Self-Harm  Description: Pt will remain free from self harm/injury during hospitalization.  Pt will have a decrease in suicidal ideation during hospitalization.    Pt denies SI.    Outcome: Improving     Problem: Fall Injury Risk  Goal: Absence of Fall and Fall-Related Injury  Description: Pt will remain free from falls during hospitalization.  Pt will wear non skid socks/appropriate footwear.     Pt has been free from falls this shift.   Outcome: Improving       Face to face end of shift report communicated to oncoming RN. Reported that pt is a fall risk and suicide risk.     Magy Barrow RN  5/27/2020  2:20 PM

## 2020-05-27 NOTE — PLAN OF CARE
Face to face shift report received from ROSALINA Ferrara RN. Rounding completed, pt observed.   Problem: Adult Behavioral Health Plan of Care  Goal: Patient-Specific Goal (Individualization)  Description: Pt will follow recommendations of treatment team.  Pt will be compliant with medications.  Pt will sleep 6-8 hours each night.  Pt will attend 50 % of groups.  Outcome: No Change  Note: Shift Summery:   Covid -Negative  Pt is a high fall risk  Pt has been present in unit milieu, she attended groups and participated. Pt denies pain and SI. She has no plans to self-harm. She getting alone well with peers and displayed safe behaviors. Overall appearance is disheveled. Mood is labile and affect is flat. BS 90 at dinner ans 173 at HS. Zero units was given. Nursing will continue to monitor pt for any changes in behavior/condition     Face to face report will be communicated to oncoming RN.    Milagro Powers RN  5/26/2020  10:26 PM   Problem: Suicide Risk  Goal: Absence of Self-Harm  Description: Pt will remain free from self harm/injury during hospitalization.  Pt will have a decrease in suicidal ideation during hospitalization.    Outcome: No Change     Problem: Fall Injury Risk  Goal: Absence of Fall and Fall-Related Injury  Description: Pt will remain free from falls during hospitalization.  Pt will wear non skid socks/appropriate footwear.   Outcome: No Change

## 2020-05-27 NOTE — PLAN OF CARE
Face to face end of shift report will be communicated to oncoming RN.    Problem: Adult Behavioral Health Plan of Care  Goal: Patient-Specific Goal (Individualization)  Description: Pt will follow recommendations of treatment team.  Pt will be compliant with medications.  Pt will sleep 6-8 hours each night.  Pt will attend 50 % of groups.  5/26/2020 2355 by Ashley Lofton RN  Outcome: No Change     Problem: Suicide Risk  Goal: Absence of Self-Harm  Description: Pt will remain free from self harm/injury during hospitalization.  Pt will have a decrease in suicidal ideation during hospitalization.    5/26/2020 2355 by Ashley Lofton RN  Outcome: No Change     Problem: Fall Injury Risk  Goal: Absence of Fall and Fall-Related Injury  Description: Pt will remain free from falls during hospitalization.  Pt will wear non skid socks/appropriate footwear.   5/26/2020 2355 by Ashley Lofton RN  Outcome: Improving   Face to face end of shift report obtained from HALLIE Humphrey. Pt observed resting in bed.  Pt appears to be sleeping in bed with eyes closed, having regular respirations, and position changes. 15 minutes and PRN safety checks completed with no noted complains. No self harm or falls noted or reported so far this shift. Will continue to monitor.   0203- .   0520-  Lab here. Blood sample obtained without complications. Waiting results.  0545- Pt slept 6 hours.

## 2020-05-27 NOTE — PLAN OF CARE
"  Problem: Adult Behavioral Health Plan of Care  Goal: Patient-Specific Goal (Individualization)  Description: Pt will follow recommendations of treatment team.  Pt will be compliant with medications.  Pt will sleep 6-8 hours each night.  Pt will attend 50 % of groups.  5/27/2020 1715 by Darby Mesa, RN  Outcome: No Change  Note:     VSS, afebrile.  Patient denies SI, HI, hallucinations, or pain. Patient does endorse some anxiety and depression. States it is manageable. Asleep in room at beginning of shift, came out for diner. Appetite \"okay.\" BG was 98 for diner. No insulin coverage required. Reports sleeping well. Complaint with medications. Attending groups and social with peers.   1940:         Problem: Suicide Risk  Goal: Absence of Self-Harm  Description: Pt will remain free from self harm/injury during hospitalization.  Pt will have a decrease in suicidal ideation during hospitalization.    5/27/2020 1715 by Darby Mesa, RN  Outcome: No Change     Patient denies SI.       Problem: Fall Injury Risk  Goal: Absence of Fall and Fall-Related Injury  Description: Pt will remain free from falls during hospitalization.  Pt will wear non skid socks/appropriate footwear.   5/27/2020 1715 by Darby Mesa, RN  Outcome: Improving  Patient steady and balanced. No falls thus far in shift.        "

## 2020-05-28 ENCOUNTER — APPOINTMENT (OUTPATIENT)
Dept: OCCUPATIONAL THERAPY | Facility: HOSPITAL | Age: 35
DRG: 885 | End: 2020-05-28
Attending: NURSE PRACTITIONER
Payer: MEDICARE

## 2020-05-28 LAB
GLUCOSE BLDC GLUCOMTR-MCNC: 108 MG/DL (ref 70–99)
GLUCOSE BLDC GLUCOMTR-MCNC: 109 MG/DL (ref 70–99)
GLUCOSE BLDC GLUCOMTR-MCNC: 155 MG/DL (ref 70–99)
GLUCOSE BLDC GLUCOMTR-MCNC: 177 MG/DL (ref 70–99)
GLUCOSE BLDC GLUCOMTR-MCNC: 281 MG/DL (ref 70–99)

## 2020-05-28 PROCEDURE — 97165 OT EVAL LOW COMPLEX 30 MIN: CPT | Mod: GO

## 2020-05-28 PROCEDURE — 99232 SBSQ HOSP IP/OBS MODERATE 35: CPT | Mod: 95 | Performed by: NURSE PRACTITIONER

## 2020-05-28 PROCEDURE — 00000146 ZZHCL STATISTIC GLUCOSE BY METER IP

## 2020-05-28 PROCEDURE — 97530 THERAPEUTIC ACTIVITIES: CPT | Mod: GO

## 2020-05-28 PROCEDURE — 25000132 ZZH RX MED GY IP 250 OP 250 PS 637: Mod: GY | Performed by: NURSE PRACTITIONER

## 2020-05-28 PROCEDURE — 12400000 ZZH R&B MH

## 2020-05-28 RX ORDER — AMLODIPINE BESYLATE 5 MG/1
TABLET ORAL
Qty: 60 TABLET | Refills: 5 | Status: SHIPPED | OUTPATIENT
Start: 2020-05-28 | End: 2021-03-26 | Stop reason: DRUGHIGH

## 2020-05-28 RX ADMIN — AMLODIPINE BESYLATE 5 MG: 5 TABLET ORAL at 08:39

## 2020-05-28 RX ADMIN — INSULIN GLARGINE 19 UNITS: 100 INJECTION, SOLUTION SUBCUTANEOUS at 08:42

## 2020-05-28 RX ADMIN — MONTELUKAST 10 MG: 10 TABLET, FILM COATED ORAL at 20:46

## 2020-05-28 RX ADMIN — ATORVASTATIN CALCIUM 10 MG: 10 TABLET, FILM COATED ORAL at 08:39

## 2020-05-28 RX ADMIN — LAMOTRIGINE 100 MG: 100 TABLET ORAL at 20:46

## 2020-05-28 RX ADMIN — INSULIN ASPART 1 UNITS: 100 INJECTION, SOLUTION INTRAVENOUS; SUBCUTANEOUS at 12:30

## 2020-05-28 RX ADMIN — LORATADINE 10 MG: 10 TABLET ORAL at 08:39

## 2020-05-28 RX ADMIN — CLOZAPINE 150 MG: 50 TABLET ORAL at 08:45

## 2020-05-28 RX ADMIN — AMOXICILLIN 500 MG: 500 CAPSULE ORAL at 20:46

## 2020-05-28 RX ADMIN — LISINOPRIL 5 MG: 5 TABLET ORAL at 08:39

## 2020-05-28 RX ADMIN — ARIPIPRAZOLE 30 MG: 15 TABLET ORAL at 08:39

## 2020-05-28 RX ADMIN — CARVEDILOL 6.25 MG: 6.25 TABLET, FILM COATED ORAL at 08:39

## 2020-05-28 RX ADMIN — AMOXICILLIN 500 MG: 500 CAPSULE ORAL at 08:39

## 2020-05-28 RX ADMIN — NICOTINE 1 PATCH: 21 PATCH, EXTENDED RELEASE TRANSDERMAL at 08:38

## 2020-05-28 RX ADMIN — CLOZAPINE 150 MG: 50 TABLET ORAL at 20:46

## 2020-05-28 RX ADMIN — OMEPRAZOLE 20 MG: 20 CAPSULE, DELAYED RELEASE ORAL at 06:53

## 2020-05-28 RX ADMIN — FLUOXETINE 20 MG: 20 CAPSULE ORAL at 08:39

## 2020-05-28 RX ADMIN — CARVEDILOL 6.25 MG: 6.25 TABLET, FILM COATED ORAL at 20:46

## 2020-05-28 RX ADMIN — INSULIN ASPART 1 UNITS: 100 INJECTION, SOLUTION INTRAVENOUS; SUBCUTANEOUS at 17:35

## 2020-05-28 ASSESSMENT — ACTIVITIES OF DAILY LIVING (ADL)
LAUNDRY: UNABLE TO COMPLETE
DRESS: INDEPENDENT
ORAL_HYGIENE: INDEPENDENT
HYGIENE/GROOMING: INDEPENDENT

## 2020-05-28 NOTE — PROGRESS NOTES
"[facility]  [facility] Progress Note      Impression:     (per H&P) Divina Delgadillo is a 35 year old female who presented via [facility] ED brought in by EMS from a [place] for intentional drug ingestion and overdose. She reportedly drank a 354 mL bottle of NyQuil and was consuming the second bottle when the  called 911. She did receive IV Narcan when EMS arrived secondary to assessment noting she recently had dental work and may have gotten \"Vicodin.\" When she woke, she admitted to taking Vicodin and NyQuil because she could not stop the voices in her head. Endorsed hallucinations of a \"scary man\" telling her to harm herself. Later expressed that attempt was triggerd by feeling upset with her foster mother for not being allowed to go to [facility]. Divina does have a legal guardian, Liberty Granado @ [phone] from [facility] who gave consent to allow admission.     Patient reports feeling good today, presents much brighter then previous days.  She reports sleeping good, and going to groups.  She tells me she is learning \"new stuff\" - about values and who she is, DBT skills.  Patient denies suicidal thoughts and denies auditory/visual hallucination today.  Patient remains appropriate and cooperative, and out on the unit more.  We discuss possible discharge as my understanding is she would go back to Foster parents.  After reviewing social work notes, patient is not able to go back to Foster home and her  is looking for alternative placement for her.  No medication changes today, all were reinstated at home doses.    Educated regarding medication indications, risks, benefits, side effects, contraindications and possible interactions. Verbally expressed understanding.        Diagnoses:   Schizoaffective Disorder, Bipolar Type  PTSD, Chronic  Borderline Personality Traits      Attestation:  Patient has been seen and evaluated by me,  Doris Boggs " VERONIQUE Ly CNP          Interim History:   The patient's care was discussed with the treatment team and chart notes were reviewed.    BEHAVIORAL TEAM DISCUSSION    Progress: good  Continued Stay Criteria/Rationale: Continues to stabilize, medication monitoring and will determine if adjustments are needed  Medical/Physical: See H&P  Precautions:   Falls precaution?: YES  Behavioral Orders   Procedures     Code 1 - Restrict to Unit     Routine Programming     As clinically indicated     Status 15     Every 15 minutes.     Plan: Continue medications as prescribed, may need adjustments if not showing improvements   looking for placement, feels patient needs higher level of care  Rationale for change in precautions or plan: no changes today      Participants: Doris Ly NP, Nursing          Medications:     Current Facility-Administered Medications Ordered in Epic   Medication Dose Route Frequency Last Rate Last Dose     albuterol (PROVENTIL) neb solution 2.5 mg  2.5 mg Nebulization Q6H PRN         alum & mag hydroxide-simethicone (MAALOX  ES) suspension 30 mL  30 mL Oral Q4H PRN         amLODIPine (NORVASC) tablet 5 mg  5 mg Oral Daily   5 mg at 05/28/20 0839     amoxicillin (AMOXIL) capsule 500 mg  500 mg Oral Q12H ANNE   500 mg at 05/28/20 0839     ARIPiprazole (ABILIFY) tablet 30 mg  30 mg Oral Daily   30 mg at 05/28/20 0839     atorvastatin (LIPITOR) tablet 10 mg  10 mg Oral Daily   10 mg at 05/28/20 0839     bisacodyl (DULCOLAX) Suppository 10 mg  10 mg Rectal Daily PRN         carvedilol (COREG) tablet 6.25 mg  6.25 mg Oral BID   6.25 mg at 05/28/20 0839     cloZAPine (CLOZARIL) tablet 150 mg  150 mg Oral BID   150 mg at 05/28/20 0845     glucose gel 15-30 g  15-30 g Oral Q15 Min PRN        Or     dextrose 50 % injection 25-50 mL  25-50 mL Intravenous Q15 Min PRN        Or     glucagon injection 1 mg  1 mg Subcutaneous Q15 Min PRN         FLUoxetine (PROzac) capsule 20 mg  20 mg Oral Daily   20  "mg at 05/28/20 0839     hydrOXYzine (ATARAX) tablet 25-50 mg  25-50 mg Oral TID PRN   50 mg at 05/26/20 0913     insulin aspart (NovoLOG) injection (RAPID ACTING)  1-7 Units Subcutaneous TID AC   1 Units at 05/28/20 1230     insulin aspart (NovoLOG) injection (RAPID ACTING)  1-5 Units Subcutaneous At Bedtime         insulin glargine (LANTUS PEN) injection 19 Units  19 Units Subcutaneous Daily   19 Units at 05/28/20 0842     lamoTRIgine (LaMICtal) tablet 100 mg  100 mg Oral At Bedtime   100 mg at 05/27/20 2111     lisinopril (ZESTRIL) tablet 5 mg  5 mg Oral Daily   5 mg at 05/28/20 0839     loratadine (CLARITIN) tablet 10 mg  10 mg Oral QAM   10 mg at 05/28/20 0839     magnesium hydroxide (MILK OF MAGNESIA) suspension 30 mL  30 mL Oral At Bedtime PRN         montelukast (SINGULAIR) tablet 10 mg  10 mg Oral At Bedtime   10 mg at 05/27/20 2110     nicotine (NICODERM CQ) 21 MG/24HR 24 hr patch 1 patch  1 patch Transdermal Daily   1 patch at 05/28/20 0838     nicotine Patch in Place   Transdermal Q8H   Stopped at 05/27/20 0412     OLANZapine (zyPREXA) tablet 5-10 mg  5-10 mg Oral BID PRN        Or     OLANZapine (zyPREXA) injection 5-10 mg  5-10 mg Intramuscular BID PRN         omeprazole (priLOSEC) CR capsule 20 mg  20 mg Oral QAM AC   20 mg at 05/28/20 0653     semaglutide (OZEMPIC) pen for injection 1 mg  1 mg Subcutaneous Q7 Days         Current Outpatient Medications Ordered in Epic   Medication     amLODIPine (NORVASC) 5 MG tablet              10 point ROS negative - see H&P       Allergies:     Allergies   Allergen Reactions     Acetaminophen Other (See Comments)     pt previously tried to overdose on it, PMD does not want pt taking per pt.      Latex Rash            Psychiatric Examination:   /50   Pulse 81   Temp 98.3  F (36.8  C) (Tympanic)   Resp 16   Ht 1.6 m (5' 3\")   Wt 80 kg (176 lb 6.4 oz)   SpO2 96%   BMI 31.25 kg/m    Weight is 176 lbs 6.4 oz  Body mass index is 31.25 kg/m .    Appearance:  " adequately groomed and dressed in hospital scrubs - sleepy  Attitude:  cooperative  Eye Contact:  good  Mood:  better  Affect:  appropriate and in normal range  Speech:  clear, coherent  Psychomotor Behavior:  no evidence of tardive dyskinesia, dystonia, or tics  Thought Process:  goal oriented  Associations:  no loose associations  Thought Content:  no evidence of suicidal ideation or homicidal ideation and patient denies active psychosis  Insight:  limited  Judgment:  limited  Oriented to:  time, person, and place  Attention Span and Concentration:  intact  Recent and Remote Memory:  intact  Fund of Knowledge: appropriate  Muscle Strength and Tone: normal  Gait and Station: Normal           Labs:     Results for orders placed or performed during the hospital encounter of 05/26/20   Glucose by meter     Status: Abnormal   Result Value Ref Range    Glucose 172 (H) 70 - 99 mg/dL   Glucose by meter     Status: None   Result Value Ref Range    Glucose 90 70 - 99 mg/dL   Glucose by meter     Status: Abnormal   Result Value Ref Range    Glucose 173 (H) 70 - 99 mg/dL   Comprehensive metabolic panel     Status: Abnormal   Result Value Ref Range    Sodium 142 133 - 144 mmol/L    Potassium 4.4 3.4 - 5.3 mmol/L    Chloride 113 (H) 94 - 109 mmol/L    Carbon Dioxide 25 20 - 32 mmol/L    Anion Gap 4 3 - 14 mmol/L    Glucose 131 (H) 70 - 99 mg/dL    Urea Nitrogen 18 7 - 30 mg/dL    Creatinine 1.18 (H) 0.52 - 1.04 mg/dL    GFR Estimate 60 (L) >60 mL/min/[1.73_m2]    GFR Estimate If Black 70 >60 mL/min/[1.73_m2]    Calcium 8.4 (L) 8.5 - 10.1 mg/dL    Bilirubin Total 0.2 0.2 - 1.3 mg/dL    Albumin 2.9 (L) 3.4 - 5.0 g/dL    Protein Total 6.5 (L) 6.8 - 8.8 g/dL    Alkaline Phosphatase 193 (H) 40 - 150 U/L    ALT 57 (H) 0 - 50 U/L    AST 38 0 - 45 U/L   Glucose by meter     Status: Abnormal   Result Value Ref Range    Glucose 100 (H) 70 - 99 mg/dL   Glucose by meter     Status: Abnormal   Result Value Ref Range    Glucose 135 (H) 70 -  "99 mg/dL   Glucose by meter     Status: Abnormal   Result Value Ref Range    Glucose 158 (H) 70 - 99 mg/dL   Glucose by meter     Status: None   Result Value Ref Range    Glucose 98 70 - 99 mg/dL   Glucose by meter     Status: Abnormal   Result Value Ref Range    Glucose 138 (H) 70 - 99 mg/dL   Glucose by meter     Status: Abnormal   Result Value Ref Range    Glucose 109 (H) 70 - 99 mg/dL   Glucose by meter     Status: Abnormal   Result Value Ref Range    Glucose 108 (H) 70 - 99 mg/dL   Glucose by meter     Status: Abnormal   Result Value Ref Range    Glucose 155 (H) 70 - 99 mg/dL     Divina Delgadillo is a 35 year old female who is being evaluated via a billable video visit.      The patient has been notified of following:     \"This video visit will be conducted via a call between you and your physician/provider. We have found that certain health care needs can be provided without the need for an in-person physical exam.  This service lets us provide the care you need with a video conversation.  If a prescription is necessary we can send it directly to your pharmacy.  If lab work is needed we can place an order for that and you can then stop by our lab to have the test done at a later time.    If during the course of the call the physician/provider feels a video visit is not appropriate, you will not be charged for this service.\"     Patient has given verbal consent for Video visit? Yes    Video-Visit Details  Video Start Time: 1049    Video End Time (time video stopped): 1055    Originating Location (pt. Location): [facility]    Distant Location (provider location):  [facility]     Mode of Communication:  Video Conference via [employer]                    "

## 2020-05-28 NOTE — PLAN OF CARE
"Face to face shift report received from Cookie WALLS RN. Patient observed.    Problem: Adult Behavioral Health Plan of Care  Goal: Patient-Specific Goal (Individualization)  Description: Pt will follow recommendations of treatment team.  Pt will be compliant with medications.  Pt will sleep 6-8 hours each night.  Pt will attend 50 % of groups.  5/28/2020 1003 by Nataliia Raymond RN  Outcome: Improving  Note: Patient is up in the lounge this morning she is social with peers and staff. Pt eats well at breakfast, she attends groups, compliant with medications. She denies SI, HI, depression, anxiety, and hallucinations. Pt asks if she will be able to go home, she states she feels like she is doing well and would like to go home.   Pt is asked to have someone bring her home medication in [place] she states she lives three hours away and she has no one that would bring it here. Pt states \"if I can go home to day I can take it\" pt reports she takes this mediation every Thursday.        Problem: Suicide Risk  Goal: Absence of Self-Harm  Description: Pt will remain free from self harm/injury during hospitalization.  Pt will have a decrease in suicidal ideation during hospitalization.    Pt denies SI, HI, depression. Anxiety, and hallucinations.     5/28/2020 1003 by Nataliia Raymond RN  Outcome: No Change     Problem: Fall Injury Risk  Goal: Absence of Fall and Fall-Related Injury  Description: Pt will remain free from falls during hospitalization.  Pt will wear non skid socks/appropriate footwear.     Pt is steady on feet. She is free from falls.   5/28/2020 1003 by Nataliia Raymond RN  Outcome: Improving   The face to face report will be communicated to on coming RN.     "

## 2020-05-28 NOTE — PLAN OF CARE
Face to face shift report received from Cookie WALLS RN. Patient observed.    Problem: Adult Behavioral Health Plan of Care  Goal: Patient-Specific Goal (Individualization)  Description: Pt will follow recommendations of treatment team.  Pt will be compliant with medications.  Pt will sleep 6-8 hours each night.  Pt will attend 50 % of groups.  5/28/2020 1003 by Nataliia Raymond RN  Outcome: Improving  Note: Patient is up in the lounge this morning she is social with peers and staff. Pt eats well at breakfast, she attends groups, compliant with medications. She denies SI, HI, depression, anxiety, and hallucinations. Pt asks if she will be able to go home, she states she feels like she is doing well and would like to go home.   Pt is asked to have someone bring her home medication in [place] she states she is not able        Problem: Suicide Risk  Goal: Absence of Self-Harm  Description: Pt will remain free from self harm/injury during hospitalization.  Pt will have a decrease in suicidal ideation during hospitalization.    5/28/2020 1003 by Nataliia Raymond RN  Outcome: No Change     Problem: Fall Injury Risk  Goal: Absence of Fall and Fall-Related Injury  Description: Pt will remain free from falls during hospitalization.  Pt will wear non skid socks/appropriate footwear.   5/28/2020 1003 by Nataliia Raymond RN  Outcome: Improving   The face to face report will be communicated to on coming RN.

## 2020-05-28 NOTE — PLAN OF CARE
"Behavioral Health Occupational Therapy Eval      Name: Divina Delgadillo MRN# [id number]   Age: 35 year old YOB: 1985     Date of Consultation: May 28, 2020  Primary care provider: Jaleesa Velasquez    Referring Physician: Doris Ly NP.   Orders: Eval and Treat  Medical Diagnosis: Schizoaffective Disorder, Bipolar Type  PTSD, Chronic  Borderline Personality Traits  Onset of Illness/Injury: Admit date 5/26/20.  Prior Level of Function: Resides at an adult foster care. Independent with BADLs. 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     Current Level of Function: Patient notes that she hasn't showered yet since arriving notes that she uses gel in her hair and doesn't want to be all frizzy. Provided with hair gel and encouraged a shower. Discussed coping strategies especially when feeling sad or depressed as patient notes she struggles with this. Discussed the use of weighted item with patient and she is aware of them but notes that she doesn't have any weighted items. Discussed what symptoms they are indicated for. She chose to make a weighted bunny \"alex\". Able to process through the steps well, initiate and end task appropriately. Item was placed in her personal belongings.     Patient/Family Goal: To discharge back home.     Fall Screen:   Have you fallen 2 or more times in the last year? Yes  Have you fallen and had an injury in the last year? No  Timed up & go: NT  Is patient a fall risk? Yes    Past Medical History:   Past Medical History:   Diagnosis Date     Cervical high risk HPV (human papillomavirus) test positive 6/20/2017     Depressive disorder, not elsewhere classified      DVT (deep venous thrombosis) (H)      Dysmetabolic syndrome X      Esophageal reflux     GERD     Mild intermittent asthma      Other abnormal heart sounds     Heart murmur     Other specified types of schizophrenia, unspecified condition      Pancreatic cancer (H)     left adrenal gland, partial pancreas, " and spleen removed; no chemo or radiation.      Type II or unspecified type diabetes mellitus without mention of complication, not stated as uncontrolled      Unspecified disorder of tympanic membrane        Past Surgical History:  Past Surgical History:   Procedure Laterality Date     ADRENALECTOMY       IR LIVER BIOPSY PERCUTANEOUS  11/14/2019     PANCREATECTOMY PARTIAL       PERCUTANEOUS BIOPSY LIVER Right 11/14/2019    Procedure: Percutaneous Liver Biopsy;  Surgeon: Sean Guzman PA-C;  Location: [facility] OR     SPLENECTOMY       SURGICAL HISTORY OF -   10/1/2000    Tympanoplasty     SURGICAL HISTORY OF -   10/1/2000    Tonsillectomy       Medications:   Current Facility-Administered Medications   Medication     albuterol (PROVENTIL) neb solution 2.5 mg     alum & mag hydroxide-simethicone (MAALOX  ES) suspension 30 mL     amLODIPine (NORVASC) tablet 5 mg     amoxicillin (AMOXIL) capsule 500 mg     ARIPiprazole (ABILIFY) tablet 30 mg     atorvastatin (LIPITOR) tablet 10 mg     bisacodyl (DULCOLAX) Suppository 10 mg     carvedilol (COREG) tablet 6.25 mg     cloZAPine (CLOZARIL) tablet 150 mg     glucose gel 15-30 g    Or     dextrose 50 % injection 25-50 mL    Or     glucagon injection 1 mg     FLUoxetine (PROzac) capsule 20 mg     hydrOXYzine (ATARAX) tablet 25-50 mg     insulin aspart (NovoLOG) injection (RAPID ACTING)     insulin aspart (NovoLOG) injection (RAPID ACTING)     insulin glargine (LANTUS PEN) injection 19 Units     lamoTRIgine (LaMICtal) tablet 100 mg     lisinopril (ZESTRIL) tablet 5 mg     loratadine (CLARITIN) tablet 10 mg     magnesium hydroxide (MILK OF MAGNESIA) suspension 30 mL     montelukast (SINGULAIR) tablet 10 mg     nicotine (NICODERM CQ) 21 MG/24HR 24 hr patch 1 patch     nicotine Patch in Place     OLANZapine (zyPREXA) tablet 5-10 mg    Or     OLANZapine (zyPREXA) injection 5-10 mg     omeprazole (priLOSEC) CR capsule 20 mg     semaglutide (OZEMPIC) pen for injection 1 mg        Reason for OT Referral:  Mental Health History: History of mental health inpatient hospitalizations.   Signs/Symptoms of compliant: SI with attempt.   Aggravating factors/Current Life Stressors: Fight with her Foster mother. Hearing voices.   Current Services: Guardian. Therapist.     Personal Information:  Family Structure: Foster mother. 2 sisters and a brother. 2 nephews.   Living Arrangement: Lives in an adult foster home.   Finances: SSDI/SSI  Support System: Family and [employer].     Physical Presentation:   Mobility: WFL  Strength/ROM: NT       Self Care:   Nutrition: I do okay  Sleep Pattern: I have difficulty  Exercise: I do okay  Spiritual Practice: I do okay  Leisure: I do okay  Coping Skills: I have difficulty    Cognition:  Orientation:  time, place and person  Memory: Intact  Safety awareness: Impaired   Speech/Language: Normal  Mood: Neutral  Affect: Appropriate      Goals:   To be able to discharge back home.     Planned Interventions: Coping skills.     Clinical Impressions:  Criteria for Skilled Therapeutic Intervention Met: Yes  OT Diagnosis: Impaired coping skills leading up to SI and attempt.   Influenced by the following impairments: Life stressors, MH exacerbation.   Functional limitations due to impairment: Difficulty with managing disappointment which led to SI attempt and difficulty with engagement in ADL/IADLs.   Clinical presentation: Evolving/Changing  Clinical presentation rationale: Patient could benefit from other coping skills to manage life stressors to prevent re-hospitalization and promote community based living.   Clinical Decision making (complexity): Low Complexity  Predicted Duration of Therapy Intervention (days/wks): 1 x wk x 2 wks.   Risks and Benefits of therapy have been explained: Yes  Patient, Family & other staff in agreement with plan of care: Yes  Comments: Patient could benefit from skilled OT to work on expanding coping skills.     Total Evaluation Time:  23 +  25 treatment.

## 2020-05-28 NOTE — TELEPHONE ENCOUNTER
Routing refill request to provider for review/approval because:  Labs out of range:  Cr elevated.      Mere KUNZ RN BSN

## 2020-05-28 NOTE — DISCHARGE INSTRUCTIONS
Behavioral Discharge Planning and Instructions    Summary: Divina was admitted to  with increased suicidal ideation     Main Diagnosis:  Schizoaffective Disorder, Bipolar Type, PTSD, Chronic, Borderline Personality Traits    Major Treatments, Procedures and Findings: Stabilize with medications, connect with community programs.    Symptoms to Report: feeling more aggressive, increased confusion, losing more sleep, mood getting worse or thoughts of suicide    Lifestyle Adjustment: Take all medications as prescribed, meet with doctor/ medication provider, out patient therapist, , and [facility] worker as scheduled. Abstain from alcohol or any unprescribed drugs.    Psychiatry Follow-up:       [facility]         Resources:   Crisis Intervention: [phone] or [phone] (TTY: [phone]).  Call anytime for help.  National Solomon on Mental Illness (www.mn.jeremías.org): 815.975.2689 or 406-063-1485.  Alcoholics Anonymous (www.alcoholics-anonymous.org): Check your phone book for your local chapter.  Suicide Awareness Voices of Education (SAVE) (www.save.org): 712-647-THIT (0383)  National Suicide Prevention Line (www.mentalhealthmn.org): 473-723-GKBG (2895)  Mental Health Consumer/Survivor Network of [place] (www.mhcsn.net): 217.931.5888 or 423-020-7644  Mental Health Association of [place] (www.mentalhealth.org): 957.210.6668 or 549-207-3333    General Medication Instructions:   See your medication sheet(s) for instructions.   Take all medicines as directed.  Make no changes unless your doctor suggests them.   Go to all your doctor visits.  Be sure to have all your required lab tests. This way, your medicines can be refilled on time.  Do not use any drugs not prescribed by your doctor.  Avoid alcohol.

## 2020-05-28 NOTE — PLAN OF CARE
Attempted to contact pt's adult foster care - message was left. Spoke with Lorin who directed writer to speak with pt's , Ilene. Ilene stated pt is not allowed to return back to adult foster care as they believe she needs a higher level of care. States pt will need to go to a crisis bed upon discharge as she and the guardian are currently working on finding another placement. Ilene is currently speaking with pt to inform pt of plans.     Ilene states that pt is completely current on things and does not need to have follow up arranged by writer. Message left for [facility]  (both Sue and Marianela) to follow up on pt's discharge plan for tomorrow.

## 2020-05-28 NOTE — PLAN OF CARE
Care of patient taken over by writer at 2025. Pt took HS medications and went to bed. No complaints at this time.    Face to face end of shift report communicated to oncoming RN.     Racquel Webb RN  5/27/2020  10:10 PM

## 2020-05-28 NOTE — PLAN OF CARE
Face to face report given with opportunity to observe patient.    Report given to HALLIE Clement.     Darby Mesa RN   5/27/2020  8:26 PM

## 2020-05-28 NOTE — PLAN OF CARE
Problem: Adult Behavioral Health Plan of Care  Goal: Patient-Specific Goal (Individualization)  Description: Pt will follow recommendations of treatment team.  Pt will be compliant with medications.  Pt will sleep 6-8 hours each night.  Pt will attend 50 % of groups.  5/28/2020 0125 by Cookie Amaya RN  Outcome: Improving  Note: Report received from Racquel. Rounding complete. Pt observed sleeping in right side lying position with regular and unlabored respirations.    Pt has been in bed with eyes closed and regular respirations. 15 minute and PRN checks all night. No complaints offered. Will continue to monitor.    0200 am accu-check did not show up in the brain so it was almost missed by this writer. Writer did get accu-check at 0354 and it was found to be 109 although it was late to be obtained. Added a task reminder to the brain for tomorrow night for 0200 accu-check.    Pt slept approx 7 hours this NOC shift.    0600-AM     Face to face end of shift report communicated to oncoming RN.    May 28, 2020  1:26 AM          Problem: Suicide Risk  Goal: Absence of Self-Harm  Description: Pt will remain free from self harm/injury during hospitalization.  Pt will have a decrease in suicidal ideation during hospitalization.    5/28/2020 0125 by Cookie Amaya RN  Outcome: Improving  Note: Unable to assess due to pt sleeping. Pt has remained free of self-harm.       Problem: Fall Injury Risk  Goal: Absence of Fall and Fall-Related Injury  Description: Pt will remain free from falls during hospitalization.  Pt will wear non skid socks/appropriate footwear.   5/28/2020 0125 by Cookie Amaya RN  Outcome: Improving  Note: Unable to assess due to pt sleeping. Pt has remained free of falls and injury this NOC shift.

## 2020-05-29 VITALS
WEIGHT: 176.4 LBS | BODY MASS INDEX: 31.25 KG/M2 | DIASTOLIC BLOOD PRESSURE: 51 MMHG | RESPIRATION RATE: 17 BRPM | OXYGEN SATURATION: 98 % | HEART RATE: 92 BPM | TEMPERATURE: 98.2 F | HEIGHT: 63 IN | SYSTOLIC BLOOD PRESSURE: 126 MMHG

## 2020-05-29 LAB
GLUCOSE BLDC GLUCOMTR-MCNC: 116 MG/DL (ref 70–99)
GLUCOSE BLDC GLUCOMTR-MCNC: 150 MG/DL (ref 70–99)
GLUCOSE BLDC GLUCOMTR-MCNC: 200 MG/DL (ref 70–99)

## 2020-05-29 PROCEDURE — 25000132 ZZH RX MED GY IP 250 OP 250 PS 637: Mod: GY | Performed by: NURSE PRACTITIONER

## 2020-05-29 PROCEDURE — 00000146 ZZHCL STATISTIC GLUCOSE BY METER IP

## 2020-05-29 PROCEDURE — 99239 HOSP IP/OBS DSCHRG MGMT >30: CPT | Mod: 95 | Performed by: NURSE PRACTITIONER

## 2020-05-29 PROCEDURE — 99231 SBSQ HOSP IP/OBS SF/LOW 25: CPT | Performed by: NURSE PRACTITIONER

## 2020-05-29 RX ADMIN — ATORVASTATIN CALCIUM 10 MG: 10 TABLET, FILM COATED ORAL at 08:27

## 2020-05-29 RX ADMIN — FLUOXETINE 20 MG: 20 CAPSULE ORAL at 08:28

## 2020-05-29 RX ADMIN — INSULIN GLARGINE 19 UNITS: 100 INJECTION, SOLUTION SUBCUTANEOUS at 08:29

## 2020-05-29 RX ADMIN — AMOXICILLIN 500 MG: 500 CAPSULE ORAL at 08:27

## 2020-05-29 RX ADMIN — AMLODIPINE BESYLATE 5 MG: 5 TABLET ORAL at 08:28

## 2020-05-29 RX ADMIN — CLOZAPINE 150 MG: 50 TABLET ORAL at 08:27

## 2020-05-29 RX ADMIN — CARVEDILOL 6.25 MG: 6.25 TABLET, FILM COATED ORAL at 08:27

## 2020-05-29 RX ADMIN — LORATADINE 10 MG: 10 TABLET ORAL at 08:28

## 2020-05-29 RX ADMIN — INSULIN ASPART 1 UNITS: 100 INJECTION, SOLUTION INTRAVENOUS; SUBCUTANEOUS at 12:11

## 2020-05-29 RX ADMIN — LISINOPRIL 5 MG: 5 TABLET ORAL at 08:27

## 2020-05-29 RX ADMIN — ARIPIPRAZOLE 30 MG: 15 TABLET ORAL at 08:27

## 2020-05-29 RX ADMIN — OMEPRAZOLE 20 MG: 20 CAPSULE, DELAYED RELEASE ORAL at 06:13

## 2020-05-29 ASSESSMENT — ACTIVITIES OF DAILY LIVING (ADL)
HYGIENE/GROOMING: INDEPENDENT
ORAL_HYGIENE: INDEPENDENT
DRESS: INDEPENDENT;SCRUBS (BEHAVIORAL HEALTH)
LAUNDRY: UNABLE TO COMPLETE

## 2020-05-29 NOTE — PLAN OF CARE
Problem: Adult Behavioral Health Plan of Care  Goal: Patient-Specific Goal (Individualization)  Description: Pt will follow recommendations of treatment team.  Pt will be compliant with medications.  Pt will sleep 6-8 hours each night.  Pt will attend 50 % of groups.  5/29/2020 0139 by Cookie Amaya, RN  Outcome: Improving  Note: Report received from Marianela. Rounding complete. Pt observed sleeping in supine position with regular and unlabored respirations.    Pt has been in bed with eyes closed and regular respirations. 15 minute and PRN checks all night. No complaints offered. Will continue to monitor.    0600- AM     Pt slept 7.5 hours     Face to face end of shift report communicated to oncoming RN.    May 29, 2020  1:39 AM          Problem: Suicide Risk  Goal: Absence of Self-Harm  Description: Pt will remain free from self harm/injury during hospitalization.  Pt will have a decrease in suicidal ideation during hospitalization.    Outcome: Improving  Note: Unable to assess due to pt sleeping. Pt has remained free of self-harm.       Problem: Fall Injury Risk  Goal: Absence of Fall and Fall-Related Injury  Description: Pt will remain free from falls during hospitalization.  Pt will wear non skid socks/appropriate footwear.   5/29/2020 0139 by Cookie Amaya, RN  Outcome: Improving  Note: Unable to assess due to pt sleeping. Pt has remained free of falls and injury this NOC shift.

## 2020-05-29 NOTE — PROGRESS NOTES
[facility]    Medical Services Progress Note    Date of Service (when I saw the patient): 05/29/2020    Assessment & Plan     Principal Problem:    Suicidal behavior     Active Medical Problems:    Esophageal reflux- pt reports taking omeprazole and denies reflux symptoms. Will order home dose of omeprazole.   5/29/20- pt denies any reflux symptoms.       Essential hypertension- pt reports she takes lisinopril, coreg and norvasc for blood pressure control. She states she can't remember the dose. Will order home doses of bp meds after dose is confirmed. BP stable. Pt denies chest pain, sob  5/27/20- vitals are stable. Pt denies chest pain, sob. /58. Pt denies any acute complaints.  5/29/20- vitals stable. Pt denies chest pain, sob.       Stage 3 chronic kidney disease (H)- Creatinine 5/25/20 was 1.43. Pt was treated for hyperkalemia in ED. K+ level was 6.1. CMP ordered for morning. Will continue to monitor.   5/27/20- pt creatinine this am improving at 1.18. potasium level was normal at 4.4.       Type 2 diabetes mellitus with stage 3 chronic kidney disease, with long-term current use of insulin (H)- last A1c 7.8 on 5/18/20. Pt reports she takes lantus 25 units once a day and Ozempic every Thursday. Ordered blood glucose monitoring qid and bedtime. Moderate consistent carb diet. Will order diabetic home meds once confirmed.    5/27/20- blood glucose levels are stable and staying in the 100's. 100-172.   5/29/20- blood glucose levels are stable. 116, 150 today. Pt denies any acute complaints.      Mild intermittent asthma with acute exacerbation- pt reports she uses albuterol inhaler as needed for wheezing. Pt reports she has not needed to use it in months. Denies chest pain, sob, difficulty breathing. Albuterol neb tx ordered prn.   5/27/20- pt denies sob, chest pain, difficulty breathing.   5/29/20- pt denies chest pain, sob, difficulty breathing.      Leukocytosis- pt has hx of chronic  leukocytosis. wbc on admission was 21.7.  Pt states she was told this is from Clozaril.  Discussed with Pmhnp.       covid screen- negative     Code Status: Full Code.    Isabel Amato CNP        -Data reviewed today: I reviewed all new labs and imaging results over the last 24 hours.     Physical Exam   Temp: 98.6  F (37  C) Temp src: Tympanic BP: (!) 110/40 Pulse: 78   Resp: 16 SpO2: 95 % O2 Device: None (Room air)    Vitals:    05/26/20 0340   Weight: 80 kg (176 lb 6.4 oz)     Vital Signs with Ranges  Temp:  [98.5  F (36.9  C)-99.4  F (37.4  C)] 98.6  F (37  C)  Pulse:  [78-89] 78  Resp:  [16] 16  BP: (104-120)/(40-55) 110/40  SpO2:  [95 %-96 %] 95 %  No intake/output data recorded.    Constitutional: NAD, vitals stable, appears well  Respiratory: CTA bilaterally, no rales, no rhonchi, no wheezing, no crackles, symmetric rise and fall of chest   Cardiovascular: RRR, S1, S2, no extra heart sounds, no murmurs, no edema   GI: normal inspection, normoactive bowel sounds x 4, no tenderness, no guarding, no masses   Skin/Integumen: warm,dry,intact, no rashes,no open wounds, no cyanosis       Medications     amLODIPine  5 mg Oral Daily     ARIPiprazole  30 mg Oral Daily     atorvastatin  10 mg Oral Daily     carvedilol  6.25 mg Oral BID     cloZAPine  150 mg Oral BID     FLUoxetine  20 mg Oral Daily     insulin aspart  1-7 Units Subcutaneous TID AC     insulin aspart  1-5 Units Subcutaneous At Bedtime     insulin glargine  19 Units Subcutaneous Daily     lamoTRIgine  100 mg Oral At Bedtime     lisinopril  5 mg Oral Daily     loratadine  10 mg Oral QAM     montelukast  10 mg Oral At Bedtime     nicotine  1 patch Transdermal Daily     nicotine   Transdermal Q8H     omeprazole  20 mg Oral QAM AC     semaglutide  1 mg Subcutaneous Q7 Days       Data   Recent Labs   Lab 05/27/20  0534 05/25/20  1703 05/25/20  1424 05/25/20  1330   WBC  --   --   --  21.7*   HGB  --   --   --  12.2   MCV  --   --   --  97   PLT  --   --   --   357    141  --  137   POTASSIUM 4.4 4.3 6.1* 5.9*   CHLORIDE 113* 110*  --  109   CO2 25 26  --  21   BUN 18 25  --  26   CR 1.18* 1.43*  --  1.63*   ANIONGAP 4 5  --  7   CLAY 8.4* 9.1  --  8.3*   * 228*  --  247*   ALBUMIN 2.9*  --   --  3.2*   PROTTOTAL 6.5*  --   --  7.5   BILITOTAL 0.2  --   --  0.3   ALKPHOS 193*  --   --  209*   ALT 57*  --   --  41   AST 38  --   --  22       No results found for this or any previous visit (from the past 24 hour(s)).

## 2020-05-29 NOTE — PLAN OF CARE
"Ilene left writer a message inquiring about [facility] and [facility] for discharge today - Message left for pt's , Ilene, this morning to discuss discharge back to foster home as [facility] is not a guarantee and [facility] is a process. Pt will be discharging back to foster home this morning via insurance ride. Spoke with foster mom, Lorin, who said they use [facility] in [place].     Received a call back from Ilene - states that she has not found a crisis bed yet for pt and has spoken with her supervisor on the topic of pt discharging. Ilene stated that it is not a safe discharge plan for pt to return back to foster care states Lorin does not feel she can keep pt safe at this time. Supervisor stated due to covid they are able to override the 30 day eviction notice. Ilene stated that she spoke with a [facility] group home - [facility] in [place] - who previously had pt as a resident and they stated it sounds like it may be a good fit, but will take a couple days for funding to go through. Informed Ilene pt will be meeting with NP later this morning and a plan will need to be in place at that time.     Received another call from Ilene who stated she has been \"unsuccessful\" today in being able to reach anyone regarding placement - states she did speak with Lorin and they will implement \"a short term plan\" at this time.     [facility] ([phone]) will be transporting pt back to foster home this afternoon with a  time of 4:00 - they will contact the unit when they have arrived.     Writer left message for pt's guardian.     "
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
## 2020-05-29 NOTE — PLAN OF CARE
Face to face shift report received from Cookie WALLS RN. Patient observed.      Problem: Adult Behavioral Health Plan of Care  Goal: Patient-Specific Goal (Individualization)  Description: Pt will follow recommendations of treatment team.  Pt will be compliant with medications.  Pt will sleep 6-8 hours each night.  Pt will attend 50 % of groups.  5/29/2020 0934 by Nataliia Raymond RN  Outcome: Improving  Note: Patient is up in the lounge for breakfast, she is asking about discharge today. Pt is cooperative with nursing assessment, denies SI, HI, depression, anxiety, and hallucinations. Pt is free of self harm. She is compliant with medications, and social with peers while maintaining appropriate boundaries. Pt attends groups, when not in groups she lays in bed. Pt did take a nap today, she does continue to ask about discharge today. Pt will be discharge back to her foster home until new placement can be found. Pt is in agreement with this. Pt's ride has been set up for 1600 today.        Problem: Suicide Risk  Goal: Absence of Self-Harm  Description: Pt will remain free from self harm/injury during hospitalization.  Pt will have a decrease in suicidal ideation during hospitalization.    Pt is free of self harm today. Denies SI.    5/29/2020 0934 by Nataliia Raymond RN  Outcome: Improving     Problem: Fall Injury Risk  Goal: Absence of Fall and Fall-Related Injury  Description: Pt will remain free from falls during hospitalization.  Pt will wear non skid socks/appropriate footwear.     Pt is steady on feet, and free from falls today.  5/29/2020 0934 by Nataliia Raymond RN  Outcome: Improving   The face to face report will be communicated to on coming RN.

## 2020-05-29 NOTE — PLAN OF CARE
Face to face report received from Nataliia STERLING. Pt. Observed.     Problem: Adult Behavioral Health Plan of Care  Goal: Patient-Specific Goal (Individualization)  Description: Pt will follow recommendations of treatment team.  Pt will be compliant with medications.  Pt will sleep 6-8 hours each night.  Pt will attend 50 % of groups.  5/28/2020 2219 by Marianela Chester RN  Outcome: Improving  Note:     Pt. Denies HI, SI, depression, pain, and hallucinations. Pt. Reporting increased anxiety r/t discharge tomorrow. Pt. Attending unit programming. Pt. Out to lounge socializing with staff and peers. Pt. Cooperative with medications and nursing assessment. Pt. In agreement to update staff to thoughts feelings of wanting to ham self or others. Pt. Eating WDL. Pt. Denies issues with bowel and bladder. Pt. Requesting her phone be charged prior to leaving. Pt. B/P reading low via vitals machine. Pt. Encouraged to sit up and drink fluids. Pt. In agreement. Pt. Vitals rechecked by this writer manually. B/P 120/55.     Problem: Fall Injury Risk  Goal: Absence of Fall and Fall-Related Injury  Description: Pt will remain free from falls during hospitalization.  Pt will wear non skid socks/appropriate footwear.   5/28/2020 2219 by Marianela Chester RN  Outcome: Improving  Pt. Free from falls this omar shift. Pt. Wearing appropriate footwear when ambulating.    Face to face end of shift report to be communicated to oncoming RN.     Marianela Chester RN  5/28/2020

## 2020-06-01 NOTE — PLAN OF CARE
Occupational Therapy Discharge Summary    Reason for therapy discharge:    Discharged to home.    Progress towards therapy goal(s). See goals on Care Plan in [facility] electronic health record for goal details.  Goals partially met.  Barriers to achieving goals:   discharge from facility.    Therapy recommendation(s):    No further therapy is recommended.

## 2020-06-06 ENCOUNTER — TELEPHONE (OUTPATIENT)
Dept: FAMILY MEDICINE | Facility: CLINIC | Age: 35
End: 2020-06-06

## 2020-06-06 NOTE — TELEPHONE ENCOUNTER
Residential Services admin form started and routed to [facility] for completion and signature.

## 2020-06-11 ENCOUNTER — MEDICAL CORRESPONDENCE (OUTPATIENT)
Dept: HEALTH INFORMATION MANAGEMENT | Facility: CLINIC | Age: 35
End: 2020-06-11

## 2020-06-11 ENCOUNTER — TELEPHONE (OUTPATIENT)
Dept: FAMILY MEDICINE | Facility: CLINIC | Age: 35
End: 2020-06-11

## 2020-06-11 DIAGNOSIS — E11.22 TYPE 2 DIABETES MELLITUS WITH STAGE 3 CHRONIC KIDNEY DISEASE, WITH LONG-TERM CURRENT USE OF INSULIN (H): ICD-10-CM

## 2020-06-11 DIAGNOSIS — Z79.4 TYPE 2 DIABETES MELLITUS WITH STAGE 3 CHRONIC KIDNEY DISEASE, WITH LONG-TERM CURRENT USE OF INSULIN (H): ICD-10-CM

## 2020-06-11 DIAGNOSIS — N18.30 TYPE 2 DIABETES MELLITUS WITH STAGE 3 CHRONIC KIDNEY DISEASE, WITH LONG-TERM CURRENT USE OF INSULIN (H): ICD-10-CM

## 2020-06-11 RX ORDER — LANCETS
EACH MISCELLANEOUS
Qty: 102 EACH | Refills: 3 | Status: SHIPPED | OUTPATIENT
Start: 2020-06-11 | End: 2022-01-14

## 2020-06-11 NOTE — TELEPHONE ENCOUNTER
Per Part B, we will need a new rx for Accu-chek Fastclix lancets, rx's cannot be transferred between pharmacies, thank you      Thank You,  Shanta Escalante, [facility]

## 2020-06-11 NOTE — TELEPHONE ENCOUNTER
Residential Services called to request a signed medication list be faxed back with the completed form also.  Questioning the ranitidine as it is no longer being made.

## 2020-06-12 ENCOUNTER — TELEPHONE (OUTPATIENT)
Dept: FAMILY MEDICINE | Facility: CLINIC | Age: 35
End: 2020-06-12

## 2020-06-12 DIAGNOSIS — N18.30 TYPE 2 DIABETES MELLITUS WITH STAGE 3 CHRONIC KIDNEY DISEASE, WITH LONG-TERM CURRENT USE OF INSULIN (H): ICD-10-CM

## 2020-06-12 DIAGNOSIS — Z79.4 TYPE 2 DIABETES MELLITUS WITH STAGE 3 CHRONIC KIDNEY DISEASE, WITH LONG-TERM CURRENT USE OF INSULIN (H): ICD-10-CM

## 2020-06-12 DIAGNOSIS — E11.22 TYPE 2 DIABETES MELLITUS WITH STAGE 3 CHRONIC KIDNEY DISEASE, WITH LONG-TERM CURRENT USE OF INSULIN (H): ICD-10-CM

## 2020-06-12 DIAGNOSIS — I10 ESSENTIAL HYPERTENSION: ICD-10-CM

## 2020-06-12 DIAGNOSIS — K21.9 GASTROESOPHAGEAL REFLUX DISEASE WITHOUT ESOPHAGITIS: Primary | ICD-10-CM

## 2020-06-12 RX ORDER — LISINOPRIL 5 MG/1
5 TABLET ORAL DAILY
Qty: 30 TABLET | Refills: 3 | Status: SHIPPED | OUTPATIENT
Start: 2020-06-12 | End: 2020-10-02

## 2020-06-12 RX ORDER — CALCIUM CARBONATE 500 MG/1
1 TABLET, CHEWABLE ORAL 3 TIMES DAILY PRN
Qty: 100 TABLET | Refills: 0 | Status: SHIPPED | OUTPATIENT
Start: 2020-06-12 | End: 2020-11-16

## 2020-06-12 RX ORDER — CARVEDILOL 6.25 MG/1
6.25 TABLET ORAL 2 TIMES DAILY
Qty: 60 TABLET | Refills: 3 | Status: SHIPPED | OUTPATIENT
Start: 2020-06-12 | End: 2020-11-30

## 2020-06-12 NOTE — TELEPHONE ENCOUNTER
[facility] note regarding carvedilol (COREG) 6.25 MG tablet :  We have two orders of carvedilol (COREG) 6.25 MG tablet and need a clarification on which one the patient is to be on. The current directions is to take 2 tabs int he AM and 1 tab in the PM. On the doctors order form we have 1 tab twice daily. Which directions are correct? Please advise.       [facility] note regarding lisinopril (ZESTRIL) 5 MG tablet :  We need clarification in this medication. We have patient on 10mg, but on the recent doctors orders it states that patient is to be on 5mg. Please clarify which does the patient is to be on.     [facility] note regarding famotidine (PEPCID) 40 MG tablet :  This medication is unavailable from our vendors due to ranitidine recall. Is there an alternative for patient to use? Do you want patient to switch to tums? MAGDALENA pt is on scheduled omeprazole 20mg daily.

## 2020-06-12 NOTE — TELEPHONE ENCOUNTER
Do, [facility] staff  informed of message below from provider.  Requests dictation to be Faxed to: [phone] Attn: Do.   Faxed as requested.

## 2020-06-12 NOTE — TELEPHONE ENCOUNTER
Covering for primary/ordering provider    Based on chart review, lisinopril is 5 mg daily, carvedilol is 6.25 mg BID.    Use Tums in place of Famotidine.  Pended order, choose pharmacy

## 2020-06-12 NOTE — TELEPHONE ENCOUNTER
Covering for PCP (out of clinic today):  Allergy list states that she previously tried to overdose on acetaminophen, so her previous physician recommended she not take it.  If this is being administered to her by group home staff and she does not have free access to it, I think that should be fine.    Thanks,  Ryan Rosen MD

## 2020-06-12 NOTE — TELEPHONE ENCOUNTER
Group Home needing clarification. They have standing orders for her to take acetaminophen. However it is listed on allergy list.     Ok to take or discontinue? Change allergy list?     GABI MixonN, RN

## 2020-06-12 NOTE — TELEPHONE ENCOUNTER
Reason for Call:  Medication question:    Do you use a [facility]?  Name of the pharmacy and phone number for the current request:  [facility]    Name of the medication requested: Acetaminophen    Other request: Standing orders says she is allergic to Acetaminophen, group home says she can take acetaminophen according to standing orders    Can we leave a detailed message on this number? YES    Phone number patient can be reached at: Other phone number:  Group [facility], [phone]    Best Time: Any    Call taken on 6/12/2020 at 10:11 AM by Rebecca Lang

## 2020-06-16 ENCOUNTER — TELEPHONE (OUTPATIENT)
Dept: FAMILY MEDICINE | Facility: CLINIC | Age: 35
End: 2020-06-16

## 2020-06-16 ENCOUNTER — MEDICAL CORRESPONDENCE (OUTPATIENT)
Dept: HEALTH INFORMATION MANAGEMENT | Facility: CLINIC | Age: 35
End: 2020-06-16

## 2020-06-19 ENCOUNTER — TELEPHONE (OUTPATIENT)
Dept: FAMILY MEDICINE | Facility: CLINIC | Age: 35
End: 2020-06-19

## 2020-06-19 DIAGNOSIS — K59.00 CONSTIPATION, UNSPECIFIED CONSTIPATION TYPE: Primary | ICD-10-CM

## 2020-06-19 RX ORDER — POLYETHYLENE GLYCOL 3350 17 G/17G
1 POWDER, FOR SOLUTION ORAL DAILY PRN
Qty: 10 PACKET | Refills: 1 | Status: SHIPPED | OUTPATIENT
Start: 2020-06-19 | End: 2020-11-16

## 2020-06-19 NOTE — TELEPHONE ENCOUNTER
The patient does not have history of constipation as far as I know    Okay to give Miralax daily as needed of no BM for 1-2 days and continue Colace 100 mg twice daily as needed     VERONIQUE Spears CNP

## 2020-06-19 NOTE — TELEPHONE ENCOUNTER
Patient is new to current facility, started couple weeks ago, so do not have a good history.  Patient had a BM yesterday however states she is still feeling uncomfortable and constipated - took PRN colace and has been drinking prune juice.  Facility uncertain how often she usually goes.  Requesting another med to be given PRN.    Pharm ready.  Fax order to [phone]    Routing to provider.  Mere KUNZ RN

## 2020-06-19 NOTE — TELEPHONE ENCOUNTER
Reason for Call:  Other prescription    Detailed comments: [facility] patients residential area.  is calling asking for something else for constipation to be ordered.   [facility] - [place], [place] - [address]   Phone Number Patient can be reached at: [phone]        Best Time: any    Can we leave a detailed message on this number? YES    Call taken on 6/19/2020 at 10:48 AM by Luanne Lagos

## 2020-06-25 ENCOUNTER — TRANSFERRED RECORDS (OUTPATIENT)
Dept: HEALTH INFORMATION MANAGEMENT | Facility: CLINIC | Age: 35
End: 2020-06-25

## 2020-06-26 ENCOUNTER — PATIENT OUTREACH (OUTPATIENT)
Dept: CARE COORDINATION | Facility: CLINIC | Age: 35
End: 2020-06-26

## 2020-06-26 NOTE — PROGRESS NOTES
Clinic Care Coordination Contact    Situation: Patient chart reviewed by care coordinator.    Background: Patient has 2 ED/ hospitalization encounters within past 60 days and elevated BALBINA score prompting CC review.  Most recent ED visit on 6/25/20 for evaluation of constipation. Patient was given enema and discharged in stable condition with instructions of constipation-prevention OTC regimen.    Assessment: Patient has a complex mental health hx, resides in a group home and has a legal guardian through [facility] . She follows up routinely with PCP and specialty providers (Hepatology/GI through [facility] and Psychiatry through [facility]) as recommended.      Plan/Recommendations: Per chart review, patient has sufficient home and community support in place. No indication for care coordination outreach at this time.    Devi Rose, GABIN, RN   [facility]  - Clinic Care Coordinator

## 2020-06-29 DIAGNOSIS — Z79.4 TYPE 2 DIABETES MELLITUS WITH STAGE 3 CHRONIC KIDNEY DISEASE, WITH LONG-TERM CURRENT USE OF INSULIN (H): ICD-10-CM

## 2020-06-29 DIAGNOSIS — N18.30 TYPE 2 DIABETES MELLITUS WITH STAGE 3 CHRONIC KIDNEY DISEASE, WITH LONG-TERM CURRENT USE OF INSULIN (H): ICD-10-CM

## 2020-06-29 DIAGNOSIS — E11.22 TYPE 2 DIABETES MELLITUS WITH STAGE 3 CHRONIC KIDNEY DISEASE, WITH LONG-TERM CURRENT USE OF INSULIN (H): ICD-10-CM

## 2020-06-29 NOTE — TELEPHONE ENCOUNTER
Requested Prescriptions   Pending Prescriptions Disp Refills     semaglutide (OZEMPIC, 1 MG/DOSE,) 2 MG/1.5ML pen 9 mL 3     Sig: Inject 1 mg Subcutaneous every 7 days       There is no refill protocol information for this order              Last Written Prescription Date:  7/22/219  Last Fill Quantity: 9ml,   # refills: 3  Last Office Visit: 5/21/2020 with Eva   Future Office visit:       Routing refill request to provider for review/approval because:  Drug not on the [facility] refill protocol or controlled substance

## 2020-06-30 RX ORDER — SEMAGLUTIDE 1.34 MG/ML
1 INJECTION, SOLUTION SUBCUTANEOUS
Qty: 9 ML | Refills: 3 | Status: SHIPPED | OUTPATIENT
Start: 2020-06-30 | End: 2020-07-31

## 2020-07-09 DIAGNOSIS — F25.0 SCHIZOAFFECTIVE DISORDER, BIPOLAR TYPE (H): ICD-10-CM

## 2020-07-09 DIAGNOSIS — Z79.899 ENCOUNTER FOR LONG-TERM (CURRENT) USE OF OTHER MEDICATIONS: ICD-10-CM

## 2020-07-09 DIAGNOSIS — Z79.899 ENCOUNTER FOR LONG-TERM (CURRENT) USE OF OTHER MEDICATIONS: Primary | ICD-10-CM

## 2020-07-09 DIAGNOSIS — F20.81 SCHIZOPHRENIFORM DISORDER (H): Primary | ICD-10-CM

## 2020-07-09 LAB
BASOPHILS # BLD AUTO: 0.1 10E9/L (ref 0–0.2)
BASOPHILS NFR BLD AUTO: 0.7 %
DIFFERENTIAL METHOD BLD: ABNORMAL
EOSINOPHIL # BLD AUTO: 0.2 10E9/L (ref 0–0.7)
EOSINOPHIL NFR BLD AUTO: 1.3 %
ERYTHROCYTE [DISTWIDTH] IN BLOOD BY AUTOMATED COUNT: 13.4 % (ref 10–15)
ERYTHROCYTE [DISTWIDTH] IN BLOOD BY AUTOMATED COUNT: NORMAL % (ref 10–15)
HCT VFR BLD AUTO: 37.9 % (ref 35–47)
HCT VFR BLD AUTO: NORMAL % (ref 35–47)
HGB BLD-MCNC: 12 G/DL (ref 11.7–15.7)
HGB BLD-MCNC: NORMAL G/DL (ref 11.7–15.7)
IMM GRANULOCYTES # BLD: 0.2 10E9/L (ref 0–0.4)
IMM GRANULOCYTES NFR BLD: 1.2 %
LYMPHOCYTES # BLD AUTO: 4.3 10E9/L (ref 0.8–5.3)
LYMPHOCYTES NFR BLD AUTO: 29.1 %
MCH RBC QN AUTO: 30.8 PG (ref 26.5–33)
MCH RBC QN AUTO: NORMAL PG (ref 26.5–33)
MCHC RBC AUTO-ENTMCNC: 31.7 G/DL (ref 31.5–36.5)
MCHC RBC AUTO-ENTMCNC: NORMAL G/DL (ref 31.5–36.5)
MCV RBC AUTO: 97 FL (ref 78–100)
MCV RBC AUTO: NORMAL FL (ref 78–100)
MONOCYTES # BLD AUTO: 1 10E9/L (ref 0–1.3)
MONOCYTES NFR BLD AUTO: 7 %
NEUTROPHILS # BLD AUTO: 8.9 10E9/L (ref 1.6–8.3)
NEUTROPHILS NFR BLD AUTO: 60.7 %
NRBC # BLD AUTO: 0 10*3/UL
NRBC BLD AUTO-RTO: 0 /100
PLATELET # BLD AUTO: 421 10E9/L (ref 150–450)
PLATELET # BLD AUTO: NORMAL 10E9/L (ref 150–450)
RBC # BLD AUTO: 3.89 10E12/L (ref 3.8–5.2)
RBC # BLD AUTO: NORMAL 10E12/L (ref 3.8–5.2)
WBC # BLD AUTO: 14.6 10E9/L (ref 4–11)
WBC # BLD AUTO: NORMAL 10E9/L (ref 4–11)

## 2020-07-09 PROCEDURE — 36415 COLL VENOUS BLD VENIPUNCTURE: CPT | Performed by: PSYCHIATRY & NEUROLOGY

## 2020-07-09 PROCEDURE — 85025 COMPLETE CBC W/AUTO DIFF WBC: CPT | Performed by: PSYCHIATRY & NEUROLOGY

## 2020-07-23 DIAGNOSIS — E78.5 HYPERLIPIDEMIA LDL GOAL <100: ICD-10-CM

## 2020-07-23 NOTE — TELEPHONE ENCOUNTER
Pharmacy note: Transferred to us with no refills. Please send new order as she is completely out of this medication.

## 2020-07-24 RX ORDER — ATORVASTATIN CALCIUM 10 MG/1
10 TABLET, FILM COATED ORAL DAILY
Qty: 90 TABLET | Refills: 1 | Status: SHIPPED | OUTPATIENT
Start: 2020-07-24 | End: 2020-12-29

## 2020-07-27 ENCOUNTER — OFFICE VISIT (OUTPATIENT)
Dept: PODIATRY | Facility: CLINIC | Age: 35
End: 2020-07-27
Payer: MEDICARE

## 2020-07-27 VITALS
DIASTOLIC BLOOD PRESSURE: 64 MMHG | WEIGHT: 176 LBS | HEART RATE: 87 BPM | SYSTOLIC BLOOD PRESSURE: 128 MMHG | BODY MASS INDEX: 31.18 KG/M2 | HEIGHT: 63 IN

## 2020-07-27 DIAGNOSIS — M77.8 TENDINITIS OF BOTH FEET: Primary | ICD-10-CM

## 2020-07-27 DIAGNOSIS — E11.22 TYPE 2 DIABETES MELLITUS WITH STAGE 3 CHRONIC KIDNEY DISEASE, WITH LONG-TERM CURRENT USE OF INSULIN (H): ICD-10-CM

## 2020-07-27 DIAGNOSIS — N18.30 TYPE 2 DIABETES MELLITUS WITH STAGE 3 CHRONIC KIDNEY DISEASE, WITH LONG-TERM CURRENT USE OF INSULIN (H): ICD-10-CM

## 2020-07-27 DIAGNOSIS — Z79.4 TYPE 2 DIABETES MELLITUS WITH STAGE 3 CHRONIC KIDNEY DISEASE, WITH LONG-TERM CURRENT USE OF INSULIN (H): ICD-10-CM

## 2020-07-27 PROCEDURE — 99203 OFFICE O/P NEW LOW 30 MIN: CPT | Performed by: PODIATRIST

## 2020-07-27 RX ORDER — BLOOD SUGAR DIAGNOSTIC
STRIP MISCELLANEOUS
Qty: 150 STRIP | Refills: 1 | Status: SHIPPED | OUTPATIENT
Start: 2020-07-27 | End: 2021-04-19

## 2020-07-27 ASSESSMENT — MIFFLIN-ST. JEOR: SCORE: 1467.46

## 2020-07-27 ASSESSMENT — PAIN SCALES - GENERAL: PAINLEVEL: MILD PAIN (3)

## 2020-07-27 NOTE — PATIENT INSTRUCTIONS
Initial musculoskeletal treatment recommendation:    1.  Wear supportive foot wear (stiff soles) and/or arch supports (rigid not cushion).  2.  Stretch the calf muscles as instructed once an hour.  3.  Massage the soft tissues around the injured area in the morning to loosen the tissue with an over the counter product like Icy Hot with Lidocaine  4.  Ice the injured area in the evening; 20 min on/off.  5. Take antiinflammatory medication as indicated.    If no improvement in symptoms within four to six weeks, return to clinic for reevaluation.    SMOKING CESSATION  What's in cigarette smoke? - Cigarette smoke contains over 4,000 chemicals. Nicotine is one of the main ingredients which is an insecticide/herbicide. It is poisonous to our nervous system, increases blood clotting risk, and decreases the body's defenses to fight off infection. Another chemical is Carbon Monoxide is an asphyxiating gas that permanently binds to blood cells and blocks the transport of oxygen. This leads to tissue death and decreases your metabolism. Tar is a chemical that coats your lungs and trachea which impairs new oxygen coming in and carbon dioxide getting out of your body.   How does smoking impact surgery? - Smoking is particularly hazardous with regards to surgery. Surgery puts stress on the body and a smoker's body is already under strain from these chemicals. Putting the two together, especially for an elective surgery, could be a recipe for disaster. Smoking before and after surgery increases your risk of heart problems, slow wound healing, delayed bone healing, blood clots, wound infection and anesthesia complications.   What are the benefits of quitting? - In 20 minutes your blood pressure will drop back down to normal. In 8 hours the carbon monoxide (a toxic gas) levels in your blood stream will drop by half, and oxygen levels will return to normal. In 48 hours your chance of having a heart attack will have decreased. All  nicotine will have left your body. Your sense of taste and smell will return to a normal level. In 72 hours your bronchial tubes will relax, and your energy levels will increase. In 2 weeks your circulation will increase, and it will continue to improve for the next 10 weeks.    Recommendations for elective surgery - Ideally, patients should quit smoking 8 weeks before and at least 2 weeks after elective surgery in order to avoid complications. Simply cutting back on the amount of cigarettes smoked per day does not offer any benefit or decrease the risk of poor wound healing, heart problems, and infection. Smokers should also start taking Vitamin C and B for two weeks before surgery and two weeks after surgery.    Ways to Stop Smokin. Nicotine patches, lozenges, or gum  2. Support Groups  3. Medications (see below)    List of Medications:  1. Varenicline Tartrate (CHANTIX)   2. Bupropion HCL (WELLBUTRIN, ZYBAN) - note: make sure Wellbutrin is for smoking cessation and not other issues   3. Nicotine Patch (NICODERM)   4. Nicotine Inhaler (NICOTROL)   5. Nicotine Gum Nicotine Polacrilex   6. Nicotine Lozenge: Nicotine Polacrilex (COMMIT)   * [employer] offers a smoking support group as well!  Please visit: [email or web address]  If you are interested in these, ask about getting a prescription or talk to your primary care doctor about what may be the best way for you to quit.

## 2020-07-27 NOTE — LETTER
7/27/2020         RE: Divina Delgadillo  [address]  [place] [address]        Dear Colleague,    Thank you for referring your patient, Divina Delgadillo, to the [facility]. Please see a copy of my visit note below.    PATIENT HISTORY:  Divina Delgadillo is a 35 year old female who presents to clinic for a painful right and left foot .  The patient describes the pain as sharp stabbing.  The patient relates the pain level is moderate.  The patient relates pain is located over the top of both feet and ankles.  The patient relates the pain has been present for the past several weeks.  The patient relates pain with ambulation.  The patient has tried different shoes with little relief.         REVIEW OF SYSTEMS:  Constitutional, HEENT, cardiovascular, pulmonary, GI, , musculoskeletal, neuro, skin, endocrine and psych systems are negative, except as otherwise noted.     PAST MEDICAL HISTORY:   Past Medical History:   Diagnosis Date     Cervical high risk HPV (human papillomavirus) test positive 6/20/2017     Depressive disorder, not elsewhere classified      DVT (deep venous thrombosis) (H)      Dysmetabolic syndrome X      Esophageal reflux     GERD     Mild intermittent asthma      Other abnormal heart sounds     Heart murmur     Other specified types of schizophrenia, unspecified condition      Pancreatic cancer (H)     left adrenal gland, partial pancreas, and spleen removed; no chemo or radiation.      Type II or unspecified type diabetes mellitus without mention of complication, not stated as uncontrolled      Unspecified disorder of tympanic membrane         PAST SURGICAL HISTORY:   Past Surgical History:   Procedure Laterality Date     ADRENALECTOMY       IR LIVER BIOPSY PERCUTANEOUS  11/14/2019     PANCREATECTOMY PARTIAL       PERCUTANEOUS BIOPSY LIVER Right 11/14/2019    Procedure: Percutaneous Liver Biopsy;  Surgeon: Sean Guzman PA-C;   Location: [facility] OR     SPLENECTOMY       SURGICAL HISTORY OF -   10/1/2000    Tympanoplasty     SURGICAL HISTORY OF -   10/1/2000    Tonsillectomy        MEDICATIONS:   Current Outpatient Medications:      amLODIPine (NORVASC) 5 MG tablet, TAKE 2 TABLETS BY MOUTH EVERY DAY, Disp: 60 tablet, Rfl: 5     ARIPiprazole (ABILIFY) 30 MG tablet, Take 30 mg by mouth daily, Disp: , Rfl:      atorvastatin (LIPITOR) 10 MG tablet, Take 1 tablet (10 mg) by mouth daily, Disp: 90 tablet, Rfl: 1     blood glucose (ACCU-CHEK GUIDE) test strip, Use to test blood sugar 3 times daily (accu chek GUIDE)., Disp: 150 strip, Rfl: 11     blood glucose monitoring (ACCU-CHEK FASTCLIX) lancets, Use to test blood sugar 3 times daily, Disp: 102 each, Rfl: 3     calcium carbonate (TUMS) 500 MG chewable tablet, Take 1 tablet (500 mg) by mouth 3 times daily as needed for heartburn, Disp: 100 tablet, Rfl: 0     carvedilol (COREG) 6.25 MG tablet, Take 1 tablet (6.25 mg) by mouth 2 times daily, Disp: 60 tablet, Rfl: 3     cloZAPine (CLOZARIL) 100 MG tablet, Take 150 mg by mouth 2 times daily , Disp: , Rfl:      FLUoxetine (PROZAC) 20 MG capsule, Take 20 mg by mouth daily, Disp: , Rfl:      insulin glargine (BASAGLAR KWIKPEN) 100 UNIT/ML pen, Inject 19 Units Subcutaneous daily increase by 2 units every 3-4 days untill BG ranging around 140-150', max dose 25 units/day for titration., Disp: , Rfl:      lamoTRIgine (LAMICTAL) 100 MG tablet, Take 100 mg by mouth At Bedtime, Disp: , Rfl:      lisinopril (ZESTRIL) 5 MG tablet, Take 1 tablet (5 mg) by mouth daily, Disp: 30 tablet, Rfl: 3     loratadine (CLARITIN) 10 MG tablet, Take 1 tablet (10 mg) by mouth every morning, Disp: 30 tablet, Rfl: 5     montelukast (SINGULAIR) 10 MG tablet, Take 1 tablet (10 mg) by mouth At Bedtime, Disp: 30 tablet, Rfl: 5     omeprazole (PRILOSEC) 20 MG DR capsule, Take 1 capsule (20 mg) by mouth daily, Disp: 60 capsule, Rfl: 5     polyethylene glycol (MIRALAX) 17 g packet, Take 17 g  by mouth daily as needed for constipation, Disp: 10 packet, Rfl: 1     semaglutide (OZEMPIC, 1 MG/DOSE,) 2 MG/1.5ML pen, Inject 1 mg Subcutaneous every 7 days, Disp: 9 mL, Rfl: 3     VENTOLIN  (90 Base) MCG/ACT inhaler, inhale 2 puffs every 6 hours as needed for shortness of breath, Disp: 18 g, Rfl: 3     ALLERGIES:    Allergies   Allergen Reactions     Acetaminophen Other (See Comments)     pt previously tried to overdose on it, PMD does not want pt taking per pt.      Latex Rash        SOCIAL HISTORY:   Social History     Socioeconomic History     Marital status: Single     Spouse name: Not on file     Number of children: 0     Years of education: Not on file     Highest education level: Not on file   Occupational History     Not on file   Social Needs     Financial resource strain: Not on file     Food insecurity     Worry: Not on file     Inability: Not on file     Transportation needs     Medical: Not on file     Non-medical: Not on file   Tobacco Use     Smoking status: Current Every Day Smoker     Packs/day: 1.00     Years: 4.00     Pack years: 4.00     Types: Cigarettes     Last attempt to quit: 2019     Years since quittin.0     Smokeless tobacco: Never Used     Tobacco comment: 15 cigarettes a day    Substance and Sexual Activity     Alcohol use: No     Drug use: No     Sexual activity: Not Currently     Partners: Female     Birth control/protection: I.U.D.     Comment: Both male and female    Lifestyle     Physical activity     Days per week: Not on file     Minutes per session: Not on file     Stress: Not on file   Relationships     Social connections     Talks on phone: Not on file     Gets together: Not on file     Attends Mu-ism service: Not on file     Active member of club or organization: Not on file     Attends meetings of clubs or organizations: Not on file     Relationship status: Not on file     Intimate partner violence     Fear of current or ex partner: Not on file      "Emotionally abused: Not on file     Physically abused: Not on file     Forced sexual activity: Not on file   Other Topics Concern     Parent/sibling w/ CABG, MI or angioplasty before 65F 55M? No   Social History Narrative     Not on file        FAMILY HISTORY:   Family History   Problem Relation Age of Onset     Respiratory Mother         ASTHMA     Allergies Mother      Depression Mother      Heart Disease Father         HEART VALVE REPLACEMENT     Hypertension Father      Diabetes Father      Depression Father      Respiratory Brother      Allergies Brother      Respiratory Sister      Allergies Sister      Depression Sister      Respiratory Sister      Allergies Sister      Depression Sister      Kidney Disease No family hx of         EXAM:Vitals: /64   Pulse 87   Ht 1.6 m (5' 3\")   Wt 79.8 kg (176 lb)   BMI 31.18 kg/m    BMI= Body mass index is 31.18 kg/m .    Weight management plan: Patient was referred to their PCP to discuss a diet and exercise plan.    General appearance: Patient is alert and fully cooperative with history & exam.  No sign of distress is noted during the visit.     Psychiatric: Affect is pleasant & appropriate.  Patient appears motivated to improve health.     Respiratory: Breathing is regular & unlabored while sitting.     HEENT: Hearing is intact to spoken word.  Speech is clear.  No gross evidence of visual impairment that would impact ambulation.     Dermatologic: Skin is intact to right and left lower extremities without significant lesions, rash or abrasion.  No paronychia or evidence of soft tissue infection is noted.     Vascular: DP & PT pulses are intact & regular on the right and left.  No significant edema or varicosities noted.  CFT and skin temperature is normal to the right and left lower extremities.     Neurologic: Lower extremity sensation is intact to light touch.  No evidence of weakness or contracture in the right and left lower extremities.  No evidence of " neuropathy.     Musculoskeletal: Patient is ambulatory without assistive device or brace.  No gross ankle deformity noted.  No foot or ankle joint effusion is noted.  Noted pain on palpation over the extensor tendons bilaterally.  One notes pain with dorsiflexion against resistance bilaterally.  One notes edema along the tendons but no erythema.       ASSESSMENT / PLAN:     ICD-10-CM    1. Tendinitis of both feet  M77.9    2. Uncontrolled type 2 diabetes mellitus with diabetic polyneuropathy, with long-term current use of insulin (H)  E11.42     Z79.4     E11.65        I have explained to Divina  about the conditions.  We discussed the nature of the condition as well as the treatment plan and expected length of recovery.  At this time, the patient was instructed on icing, stretching, tissue massage and support.  The patient was fitted with a Tri-Lock ankle braces that will aid in offloading the tension forces to the soft tissues and prevent further inflammation.   The patient will return in four weeks for reevaluation if the symptoms do not resolve.      Divina verbalized agreement with and understanding of the rational for the diagnosis and treatment plan.  All questions were answered to best of my ability and the patient's satisfaction. The patient was advised to contact the clinic with any questions that may arise after the clinic visit.      Disclaimer: This note consists of symbols derived from keyboarding, dictation and/or voice recognition software. As a result, there may be errors in the script that have gone undetected. Please consider this when interpreting information found in this chart.       ERNA Douglas D.P.M., F.[email or web address].C.F.A.S.        Again, thank you for allowing me to participate in the care of your patient.        Sincerely,        Raul Douglas DPM

## 2020-07-27 NOTE — TELEPHONE ENCOUNTER
"Prescription approved per [facility] Refill Protocol.    Requested Prescriptions   Pending Prescriptions Disp Refills     blood glucose (ACCU-CHEK GUIDE) test strip 150 strip 11     Sig: Use to test blood sugar 3 times daily (accu chek GUIDE).       Diabetic Supplies Protocol Passed - 7/27/2020 12:23 PM        Passed - Medication is active on med list        Passed - Patient is 18 years of age or older        Passed - Recent (6 mo) or future (30 days) visit within the authorizing provider's specialty     Patient had office visit in the last 6 months or has a visit in the next 30 days with authorizing provider.  See \"Patient Info\" tab in inbasket, or \"Choose Columns\" in Meds & Orders section of the refill encounter.               Ramona CHEN RN, BSN      "

## 2020-07-27 NOTE — PROGRESS NOTES
PATIENT HISTORY:  Divina Delgadillo is a 35 year old female who presents to clinic for a painful right and left foot .  The patient describes the pain as sharp stabbing.  The patient relates the pain level is moderate.  The patient relates pain is located over the top of both feet and ankles.  The patient relates the pain has been present for the past several weeks.  The patient relates pain with ambulation.  The patient has tried different shoes with little relief.         REVIEW OF SYSTEMS:  Constitutional, HEENT, cardiovascular, pulmonary, GI, , musculoskeletal, neuro, skin, endocrine and psych systems are negative, except as otherwise noted.     PAST MEDICAL HISTORY:   Past Medical History:   Diagnosis Date     Cervical high risk HPV (human papillomavirus) test positive 6/20/2017     Depressive disorder, not elsewhere classified      DVT (deep venous thrombosis) (H)      Dysmetabolic syndrome X      Esophageal reflux     GERD     Mild intermittent asthma      Other abnormal heart sounds     Heart murmur     Other specified types of schizophrenia, unspecified condition      Pancreatic cancer (H)     left adrenal gland, partial pancreas, and spleen removed; no chemo or radiation.      Type II or unspecified type diabetes mellitus without mention of complication, not stated as uncontrolled      Unspecified disorder of tympanic membrane         PAST SURGICAL HISTORY:   Past Surgical History:   Procedure Laterality Date     ADRENALECTOMY       IR LIVER BIOPSY PERCUTANEOUS  11/14/2019     PANCREATECTOMY PARTIAL       PERCUTANEOUS BIOPSY LIVER Right 11/14/2019    Procedure: Percutaneous Liver Biopsy;  Surgeon: Sean Guzman PA-C;  Location: [facility] OR     SPLENECTOMY       SURGICAL HISTORY OF -   10/1/2000    Tympanoplasty     SURGICAL HISTORY OF -   10/1/2000    Tonsillectomy        MEDICATIONS:   Current Outpatient Medications:      amLODIPine (NORVASC) 5 MG tablet, TAKE 2 TABLETS BY MOUTH EVERY DAY, Disp:  60 tablet, Rfl: 5     ARIPiprazole (ABILIFY) 30 MG tablet, Take 30 mg by mouth daily, Disp: , Rfl:      atorvastatin (LIPITOR) 10 MG tablet, Take 1 tablet (10 mg) by mouth daily, Disp: 90 tablet, Rfl: 1     blood glucose (ACCU-CHEK GUIDE) test strip, Use to test blood sugar 3 times daily (accu chek GUIDE)., Disp: 150 strip, Rfl: 11     blood glucose monitoring (ACCU-CHEK FASTCLIX) lancets, Use to test blood sugar 3 times daily, Disp: 102 each, Rfl: 3     calcium carbonate (TUMS) 500 MG chewable tablet, Take 1 tablet (500 mg) by mouth 3 times daily as needed for heartburn, Disp: 100 tablet, Rfl: 0     carvedilol (COREG) 6.25 MG tablet, Take 1 tablet (6.25 mg) by mouth 2 times daily, Disp: 60 tablet, Rfl: 3     cloZAPine (CLOZARIL) 100 MG tablet, Take 150 mg by mouth 2 times daily , Disp: , Rfl:      FLUoxetine (PROZAC) 20 MG capsule, Take 20 mg by mouth daily, Disp: , Rfl:      insulin glargine (BASAGLAR KWIKPEN) 100 UNIT/ML pen, Inject 19 Units Subcutaneous daily increase by 2 units every 3-4 days untill BG ranging around 140-150', max dose 25 units/day for titration., Disp: , Rfl:      lamoTRIgine (LAMICTAL) 100 MG tablet, Take 100 mg by mouth At Bedtime, Disp: , Rfl:      lisinopril (ZESTRIL) 5 MG tablet, Take 1 tablet (5 mg) by mouth daily, Disp: 30 tablet, Rfl: 3     loratadine (CLARITIN) 10 MG tablet, Take 1 tablet (10 mg) by mouth every morning, Disp: 30 tablet, Rfl: 5     montelukast (SINGULAIR) 10 MG tablet, Take 1 tablet (10 mg) by mouth At Bedtime, Disp: 30 tablet, Rfl: 5     omeprazole (PRILOSEC) 20 MG DR capsule, Take 1 capsule (20 mg) by mouth daily, Disp: 60 capsule, Rfl: 5     polyethylene glycol (MIRALAX) 17 g packet, Take 17 g by mouth daily as needed for constipation, Disp: 10 packet, Rfl: 1     semaglutide (OZEMPIC, 1 MG/DOSE,) 2 MG/1.5ML pen, Inject 1 mg Subcutaneous every 7 days, Disp: 9 mL, Rfl: 3     VENTOLIN  (90 Base) MCG/ACT inhaler, inhale 2 puffs every 6 hours as needed for  shortness of breath, Disp: 18 g, Rfl: 3     ALLERGIES:    Allergies   Allergen Reactions     Acetaminophen Other (See Comments)     pt previously tried to overdose on it, PMD does not want pt taking per pt.      Latex Rash        SOCIAL HISTORY:   Social History     Socioeconomic History     Marital status: Single     Spouse name: Not on file     Number of children: 0     Years of education: Not on file     Highest education level: Not on file   Occupational History     Not on file   Social Needs     Financial resource strain: Not on file     Food insecurity     Worry: Not on file     Inability: Not on file     Transportation needs     Medical: Not on file     Non-medical: Not on file   Tobacco Use     Smoking status: Current Every Day Smoker     Packs/day: 1.00     Years: 4.00     Pack years: 4.00     Types: Cigarettes     Last attempt to quit: 2019     Years since quittin.0     Smokeless tobacco: Never Used     Tobacco comment: 15 cigarettes a day    Substance and Sexual Activity     Alcohol use: No     Drug use: No     Sexual activity: Not Currently     Partners: Female     Birth control/protection: I.U.D.     Comment: Both male and female    Lifestyle     Physical activity     Days per week: Not on file     Minutes per session: Not on file     Stress: Not on file   Relationships     Social connections     Talks on phone: Not on file     Gets together: Not on file     Attends Anabaptist service: Not on file     Active member of club or organization: Not on file     Attends meetings of clubs or organizations: Not on file     Relationship status: Not on file     Intimate partner violence     Fear of current or ex partner: Not on file     Emotionally abused: Not on file     Physically abused: Not on file     Forced sexual activity: Not on file   Other Topics Concern     Parent/sibling w/ CABG, MI or angioplasty before 65F 55M? No   Social History Narrative     Not on file        FAMILY HISTORY:   Family  "History   Problem Relation Age of Onset     Respiratory Mother         ASTHMA     Allergies Mother      Depression Mother      Heart Disease Father         HEART VALVE REPLACEMENT     Hypertension Father      Diabetes Father      Depression Father      Respiratory Brother      Allergies Brother      Respiratory Sister      Allergies Sister      Depression Sister      Respiratory Sister      Allergies Sister      Depression Sister      Kidney Disease No family hx of         EXAM:Vitals: /64   Pulse 87   Ht 1.6 m (5' 3\")   Wt 79.8 kg (176 lb)   BMI 31.18 kg/m    BMI= Body mass index is 31.18 kg/m .    Weight management plan: Patient was referred to their PCP to discuss a diet and exercise plan.    General appearance: Patient is alert and fully cooperative with history & exam.  No sign of distress is noted during the visit.     Psychiatric: Affect is pleasant & appropriate.  Patient appears motivated to improve health.     Respiratory: Breathing is regular & unlabored while sitting.     HEENT: Hearing is intact to spoken word.  Speech is clear.  No gross evidence of visual impairment that would impact ambulation.     Dermatologic: Skin is intact to right and left lower extremities without significant lesions, rash or abrasion.  No paronychia or evidence of soft tissue infection is noted.     Vascular: DP & PT pulses are intact & regular on the right and left.  No significant edema or varicosities noted.  CFT and skin temperature is normal to the right and left lower extremities.     Neurologic: Lower extremity sensation is intact to light touch.  No evidence of weakness or contracture in the right and left lower extremities.  No evidence of neuropathy.     Musculoskeletal: Patient is ambulatory without assistive device or brace.  No gross ankle deformity noted.  No foot or ankle joint effusion is noted.  Noted pain on palpation over the extensor tendons bilaterally.  One notes pain with dorsiflexion against " resistance bilaterally.  One notes edema along the tendons but no erythema.       ASSESSMENT / PLAN:     ICD-10-CM    1. Tendinitis of both feet  M77.9    2. Uncontrolled type 2 diabetes mellitus with diabetic polyneuropathy, with long-term current use of insulin (H)  E11.42     Z79.4     E11.65        I have explained to Divina  about the conditions.  We discussed the nature of the condition as well as the treatment plan and expected length of recovery.  At this time, the patient was instructed on icing, stretching, tissue massage and support.  The patient was fitted with a Tri-Lock ankle braces that will aid in offloading the tension forces to the soft tissues and prevent further inflammation.   The patient will return in four weeks for reevaluation if the symptoms do not resolve.      Divina verbalized agreement with and understanding of the rational for the diagnosis and treatment plan.  All questions were answered to best of my ability and the patient's satisfaction. The patient was advised to contact the clinic with any questions that may arise after the clinic visit.      Disclaimer: This note consists of symbols derived from keyboarding, dictation and/or voice recognition software. As a result, there may be errors in the script that have gone undetected. Please consider this when interpreting information found in this chart.       ERNA Douglas D.P.M., [email or web address].

## 2020-07-27 NOTE — NURSING NOTE
"Chief Complaint   Patient presents with     Consult     BL feet and ankle swelling, X2 weeks ago       Initial /64   Pulse 87   Ht 1.6 m (5' 3\")   Wt 79.8 kg (176 lb)   BMI 31.18 kg/m   Estimated body mass index is 31.18 kg/m  as calculated from the following:    Height as of this encounter: 1.6 m (5' 3\").    Weight as of this encounter: 79.8 kg (176 lb).  Medications and allergies reviewed.      Pamela COOK MA    " no

## 2020-07-30 DIAGNOSIS — K83.1 CHOLESTATIC LIVER DISEASE (H): Primary | ICD-10-CM

## 2020-07-31 DIAGNOSIS — E11.22 TYPE 2 DIABETES MELLITUS WITH STAGE 3 CHRONIC KIDNEY DISEASE, WITH LONG-TERM CURRENT USE OF INSULIN (H): ICD-10-CM

## 2020-07-31 DIAGNOSIS — Z79.4 TYPE 2 DIABETES MELLITUS WITH STAGE 3 CHRONIC KIDNEY DISEASE, WITH LONG-TERM CURRENT USE OF INSULIN (H): ICD-10-CM

## 2020-07-31 DIAGNOSIS — N18.30 TYPE 2 DIABETES MELLITUS WITH STAGE 3 CHRONIC KIDNEY DISEASE, WITH LONG-TERM CURRENT USE OF INSULIN (H): ICD-10-CM

## 2020-07-31 RX ORDER — SEMAGLUTIDE 1.34 MG/ML
1 INJECTION, SOLUTION SUBCUTANEOUS
Qty: 3 ML | Refills: 2 | Status: SHIPPED | OUTPATIENT
Start: 2020-07-31 | End: 2020-10-26

## 2020-07-31 NOTE — TELEPHONE ENCOUNTER
Requested Prescriptions   Pending Prescriptions Disp Refills     OZEMPIC (1 MG/DOSE) 2 MG/1.5ML pen [Pharmacy Med Name: Ozempic (1 MG/DOSE) 2 MG/1.5ML Solution pen-injector] 3 mL 10     Sig: INJECT 1MG SUB-Q EVERY 7 DAYS (ON THURSDAYS)       There is no refill protocol information for this order          Last seen 5/21/20.  Last filled refills for one year on 7/22/19.      Lab Results   Component Value Date    A1C 7.8 05/18/2020    A1C 7.4 02/17/2020    A1C 8.0 01/14/2020    A1C 8.1 10/17/2019    A1C 7.7 07/03/2019

## 2020-08-03 ENCOUNTER — TELEPHONE (OUTPATIENT)
Dept: FAMILY MEDICINE | Facility: CLINIC | Age: 35
End: 2020-08-03

## 2020-08-03 NOTE — TELEPHONE ENCOUNTER
Medical Provider's Approval-For RTW    Signed, faxed to [phone] and sent to [employer]. Rebecca Lang on 8/3/2020 at 9:48 AM

## 2020-08-06 ENCOUNTER — VIRTUAL VISIT (OUTPATIENT)
Dept: GASTROENTEROLOGY | Facility: CLINIC | Age: 35
End: 2020-08-06
Attending: PHYSICIAN ASSISTANT
Payer: MEDICARE

## 2020-08-06 ENCOUNTER — TELEPHONE (OUTPATIENT)
Dept: FAMILY MEDICINE | Facility: CLINIC | Age: 35
End: 2020-08-06

## 2020-08-06 VITALS
DIASTOLIC BLOOD PRESSURE: 62 MMHG | BODY MASS INDEX: 32.7 KG/M2 | HEART RATE: 84 BPM | SYSTOLIC BLOOD PRESSURE: 128 MMHG | WEIGHT: 184.6 LBS

## 2020-08-06 DIAGNOSIS — K83.1 CHOLESTATIC LIVER DISEASE (H): Primary | ICD-10-CM

## 2020-08-06 NOTE — TELEPHONE ENCOUNTER
sher with residental services--called wanted to make sure fax was received. It was sent 06/30 regarding BS.     She is resending new reading to clinic, today- [facility]

## 2020-08-11 NOTE — PROGRESS NOTES
"Divina Delgadillo is a 35 year old female who is being evaluated via a billable telephone visit.      The patient has been notified of following:     \"This telephone visit will be conducted via a call between you and your physician/provider. We have found that certain health care needs can be provided without the need for a physical exam.  This service lets us provide the care you need with a short phone conversation.  If a prescription is necessary we can send it directly to your pharmacy.  If lab work is needed we can place an order for that and you can then stop by our lab to have the test done at a later time.    Telephone visits are billed at different rates depending on your insurance coverage. During this emergency period, for some insurers they may be billed the same as an in-person visit.  Please reach out to your insurance provider with any questions.    If during the course of the call the physician/provider feels a telephone visit is not appropriate, you will not be charged for this service.\"    Patient has given verbal consent for Telephone visit?  Yes    What phone number would you like to be contacted at? [phone]    How would you like to obtain your AVS? 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 No fibrosis was found on the liver biopsy. Her Clozapine is being slowly tapered by her psychiatrist. Her Alk Phos is relatively stable. No evidence of liver dysfunction or stigmata of advanced liver disease. She was encouraged to continue her slow gradual weight loss and exercise regularly.     - Will continue to trend labs  - Clozapine to be tapered by her psychiatrist   - Continue " optimization of medical morbidities   - Follow-up in clinic in six months or sooner as needed    Sonja Bueno PA-C   [facility]    -----------------------------------------------------       HPI:   Divina Delgadillo is a 35 year old female presenting for follow-up.     Liver disease: Ductopenia   Liver biopsy: Mild ductopenia with no evidence of active parenchymal or active biliary tract disease. No fibrosis.   Risk factors for fatty liver disease:     She last saw Dr. Leventhal in February 2020 at which time he discussed speaking to her psychatrist about possibly changing clozaril to another medication. Her Clozaril is currently being tapered. She had an intentional drug overdose in May with Vicodin and Nyquil, CKD and auditory hallucinations. She was admitted to Inpatient Psych for 3 days. Moved to a Group Home in [place] 2 1/2 months ago.    She states her appetite is good. She think she may have a little weight over the past few months, weight ranging from 186-183 lbs. She walks every day for a hour for physical activity.     She to go to ER for enema due to significant constipation. She started taking Gavilax twice daily and has not had any problems since that time. She states she was having some swelling in her legs that she was told was due to tendinitis in her feet. She got new braces and the swelling has improved.     Patient denies jaundice, abdominal distension or confusion.  Patient also denies melena, hematochezia or hematemesis. Patient denies weight loss, fevers, sweats or chills.    She does not drink alcohol.     Medical hx Surgical hx   Past Medical History:   Diagnosis Date     Cervical high risk HPV (human papillomavirus) test positive 6/20/2017     Depressive disorder, not elsewhere classified      DVT (deep venous thrombosis) (H)      Dysmetabolic syndrome X      Esophageal reflux      Mild intermittent asthma      Other abnormal heart sounds      Other  specified types of schizophrenia, unspecified condition      Pancreatic cancer (H)      Type II or unspecified type diabetes mellitus without mention of complication, not stated as uncontrolled      Unspecified disorder of tympanic membrane     Past Surgical History:   Procedure Laterality Date     ADRENALECTOMY       IR LIVER BIOPSY PERCUTANEOUS  11/14/2019     PANCREATECTOMY PARTIAL       PERCUTANEOUS BIOPSY LIVER Right 11/14/2019    Procedure: Percutaneous Liver Biopsy;  Surgeon: Sean Guzman PA-C;  Location: [facility] OR     SPLENECTOMY       SURGICAL HISTORY OF -   10/1/2000    Tympanoplasty     SURGICAL HISTORY OF -   10/1/2000    Tonsillectomy                 Medications:     Current Outpatient Medications   Medication     ARIPiprazole (ABILIFY) 30 MG tablet     atorvastatin (LIPITOR) 10 MG tablet     blood glucose (ACCU-CHEK GUIDE) test strip     blood glucose monitoring (ACCU-CHEK FASTCLIX) lancets     calcium carbonate (TUMS) 500 MG chewable tablet     carvedilol (COREG) 6.25 MG tablet     cloZAPine (CLOZARIL) 100 MG tablet     FLUoxetine (PROZAC) 20 MG capsule     insulin glargine (BASAGLAR KWIKPEN) 100 UNIT/ML pen     lamoTRIgine (LAMICTAL) 100 MG tablet     lisinopril (ZESTRIL) 5 MG tablet     loratadine (CLARITIN) 10 MG tablet     montelukast (SINGULAIR) 10 MG tablet     omeprazole (PRILOSEC) 20 MG DR capsule     order for DME     polyethylene glycol (MIRALAX) 17 g packet     semaglutide (OZEMPIC, 1 MG/DOSE,) 2 MG/1.5ML pen     VENTOLIN  (90 Base) MCG/ACT inhaler     amLODIPine (NORVASC) 5 MG tablet     No current facility-administered medications for this visit.             Allergies:     Allergies   Allergen Reactions     Acetaminophen Other (See Comments)     pt previously tried to overdose on it, PMD does not want pt taking per pt.      Latex Rash            Review of Systems:   10 points ROS was obtained and highlighted in the HPI, otherwise negative.          Physical Exam:      PSYCH: Alert and oriented times 3; coherent speech, normal   rate and volume, able to articulate logical thoughts, able   to abstract reason, no tangential thoughts, no hallucinations   or delusions  Her affect is normal  RESP: No cough, no audible wheezing, able to talk in full sentences  Remainder of exam unable to be completed due to telephone visits           Data:   Reviewed in person and significant for:    Lab Results   Component Value Date     05/27/2020      Lab Results   Component Value Date    POTASSIUM 4.4 05/27/2020     Lab Results   Component Value Date    CHLORIDE 113 05/27/2020     Lab Results   Component Value Date    CO2 25 05/27/2020     Lab Results   Component Value Date    BUN 18 05/27/2020     Lab Results   Component Value Date    CR 1.18 05/27/2020       Lab Results   Component Value Date    WBC Test canceled - Lab  error 07/09/2020    WBC 14.6 07/09/2020     Lab Results   Component Value Date    HGB Test canceled - Lab  error 07/09/2020    HGB 12.0 07/09/2020     Lab Results   Component Value Date    HCT Test canceled - Lab  error 07/09/2020    HCT 37.9 07/09/2020     Lab Results   Component Value Date    MCV Test canceled - Lab  error 07/09/2020    MCV 97 07/09/2020     Lab Results   Component Value Date    PLT Test canceled - Lab  error 07/09/2020     07/09/2020       Lab Results   Component Value Date    AST 38 05/27/2020     Lab Results   Component Value Date    ALT 57 05/27/2020     Lab Results   Component Value Date    BILICONJ 0.0 06/23/2009      Lab Results   Component Value Date    BILITOTAL 0.2 05/27/2020       Lab Results   Component Value Date    ALBUMIN 2.9 05/27/2020     Lab Results   Component Value Date    PROTTOTAL 6.5 05/27/2020      Lab Results   Component Value Date    ALKPHOS 193 05/27/2020       Lab Results   Component Value Date    INR 0.97 02/03/2020     Liver biopsy: 11/14/2019  FINAL DIAGNOSIS:    LIVER, NEEDLE BIOPSY:   - Mild ductopenia with no evidence of active parenchymal or active biliary    tract disease.    MICROSCOPIC:   - Core needle biopsies are adequate for evaluation and show liver parenchyma with approximately 18 portal tracts.    - H&E sections show hepatic lobules with minimal steatosis (<5%)   and no ballooning degeneration, inflammation, or cholestasis.   - The portal tracts are remarkable for ductopenia, with absent bile ducts in   approximately 5 of the portal tracts (highlighted with CK7   immunohistochemistry), along with ceroid-laden macrophages.    - CK7 also stains periportal hepatocytes in areas of ductopenia.  The portal tracts do not contain significant inflammation or show interface activity.  - Ductular proliferation is not a prominent feature.   Florid duct lesions are not present.  There is no evidence of significant   fibrosis on trichrome stain.   -Special stain for reticulin shows preserved liver plate architecture.     The main finding in this biopsy is the presence of mild ductopenia,   without evidence of active duct injury.   An underlying cause for the ductopenia is not apparent in this biopsy, but    may have been secondary to an episode of prior bile duct injury (e.g. drug reaction) or idiopathic adult ductopenia.    There is only minimal steatosis in this biopsy and there is no evidence of steatohepatitis or significant fibrosis.

## 2020-08-12 NOTE — TELEPHONE ENCOUNTER
I received fax from JEMAL Lei, I did review BG readings, they are high, mostly high after lunch hours, we can start correction sliding scale insulin, recommend follow up to discuss this, the patient is due for diabetes visit.    VERONIQUE Spears CNP

## 2020-08-14 ENCOUNTER — OFFICE VISIT (OUTPATIENT)
Dept: FAMILY MEDICINE | Facility: CLINIC | Age: 35
End: 2020-08-14
Payer: MEDICARE

## 2020-08-14 VITALS
OXYGEN SATURATION: 98 % | WEIGHT: 182 LBS | HEIGHT: 63 IN | SYSTOLIC BLOOD PRESSURE: 116 MMHG | DIASTOLIC BLOOD PRESSURE: 58 MMHG | HEART RATE: 91 BPM | RESPIRATION RATE: 16 BRPM | TEMPERATURE: 99.2 F | BODY MASS INDEX: 32.25 KG/M2

## 2020-08-14 DIAGNOSIS — L65.9 HAIR LOSS: ICD-10-CM

## 2020-08-14 LAB
ANION GAP SERPL CALCULATED.3IONS-SCNC: 6 MMOL/L (ref 3–14)
BUN SERPL-MCNC: 26 MG/DL (ref 7–30)
CALCIUM SERPL-MCNC: 8.7 MG/DL (ref 8.5–10.1)
CHLORIDE SERPL-SCNC: 110 MMOL/L (ref 94–109)
CO2 SERPL-SCNC: 23 MMOL/L (ref 20–32)
CREAT SERPL-MCNC: 1.35 MG/DL (ref 0.52–1.04)
FERRITIN SERPL-MCNC: 27 NG/ML (ref 12–150)
GFR SERPL CREATININE-BSD FRML MDRD: 51 ML/MIN/{1.73_M2}
GLUCOSE SERPL-MCNC: 227 MG/DL (ref 70–99)
HBA1C MFR BLD: 9 % (ref 0–5.6)
POTASSIUM SERPL-SCNC: 4.3 MMOL/L (ref 3.4–5.3)
SODIUM SERPL-SCNC: 139 MMOL/L (ref 133–144)
TSH SERPL DL<=0.005 MIU/L-ACNC: 0.61 MU/L (ref 0.4–4)

## 2020-08-14 PROCEDURE — 82728 ASSAY OF FERRITIN: CPT | Performed by: NURSE PRACTITIONER

## 2020-08-14 PROCEDURE — 83036 HEMOGLOBIN GLYCOSYLATED A1C: CPT | Performed by: NURSE PRACTITIONER

## 2020-08-14 PROCEDURE — 99214 OFFICE O/P EST MOD 30 MIN: CPT | Performed by: NURSE PRACTITIONER

## 2020-08-14 PROCEDURE — 84443 ASSAY THYROID STIM HORMONE: CPT | Performed by: NURSE PRACTITIONER

## 2020-08-14 PROCEDURE — 36415 COLL VENOUS BLD VENIPUNCTURE: CPT | Performed by: NURSE PRACTITIONER

## 2020-08-14 PROCEDURE — 80048 BASIC METABOLIC PNL TOTAL CA: CPT | Performed by: NURSE PRACTITIONER

## 2020-08-14 RX ORDER — INSULIN GLARGINE 100 [IU]/ML
25 INJECTION, SOLUTION SUBCUTANEOUS DAILY
Qty: 9 ML | Refills: 2 | Status: SHIPPED | OUTPATIENT
Start: 2020-08-14 | End: 2021-01-11

## 2020-08-14 ASSESSMENT — MIFFLIN-ST. JEOR: SCORE: 1494.68

## 2020-08-14 NOTE — PATIENT INSTRUCTIONS
Add Novolog per sliding scale     Blood Glucose 150-199: 1 unit insulin  Blood Glucose 200-249: 2 units insulin  Blood Glucose 250-299: 3 units insulin  Blood Glucose 300-349: 4 units insulin  Blood Glucose Over 350: 5 units insulin

## 2020-08-14 NOTE — PROGRESS NOTES
Subjective     Divina Delgadillo is a 35 year old female who presents to clinic today for the following health issues: diabetes follow up, numbers around 200, mostly after lunch. Also, complains of hair loss.     HPI     Diabetes Follow-up    How often are you checking your blood sugar? Three times daily  Blood sugar testing frequency justification:  Uncontrolled diabetes  What time of day are you checking your blood sugars (select all that apply)?  Before and after meals  Have you had any blood sugars above 200?  Yes, frequent   Have you had any blood sugars below 70?  No    What symptoms do you notice when your blood sugar is low?  None    What concerns do you have today about your diabetes? None and Blood sugar is often over 200     Do you have any of these symptoms? (Select all that apply)  Numbness in feet, Burning in feet, Excessive thirst and Weight loss  Have you had a diabetic eye exam in the last 12 months? No , pt is aware she is due for a diabatic eye exam, has not gone due to covid.     Hyperlipidemia Follow-Up      Are you regularly taking any medication or supplement to lower your cholesterol?   Yes- atorvastatin    Are you having muscle aches or other side effects that you think could be caused by your cholesterol lowering medication?  No    Hypertension Follow-up      Do you check your blood pressure regularly outside of the clinic? Yes     Are you following a low salt diet? Yes    Are your blood pressures ever more than 140 on the top number (systolic) OR more   than 90 on the bottom number (diastolic), for example 140/90? No, this morning was 110/50, would like to discuss lisinopril because BP is low. Noticing it is lower after smoking in the morning. Discuss BP meds with pt.     BP Readings from Last 2 Encounters:   08/14/20 116/58   08/06/20 128/62     Hemoglobin A1C (%)   Date Value   08/14/2020 9.0 (H)   05/18/2020 7.8 (H)     LDL Cholesterol Calculated (mg/dL)   Date Value   01/02/2020 78  "  07/12/2019 192 (H)         How many servings of fruits and vegetables do you eat daily?  0-1    On average, how many sweetened beverages do you drink each day (Examples: soda, juice, sweet tea, etc.  Do NOT count diet or artificially sweetened beverages)?   Sugar free juice     How many days per week do you exercise enough to make your heart beat faster? 3 or less    How many minutes a day do you exercise enough to make your heart beat faster? 60 or more    How many days per week do you miss taking your medication? 0    Reviewed and updated as needed this visit by Provider         Review of Systems   Constitutional, HEENT, cardiovascular, pulmonary, gi and gu systems are negative, except as otherwise noted.      Objective    /58   Pulse 91   Temp 99.2  F (37.3  C) (Tympanic)   Resp 16   Ht 1.6 m (5' 3\")   Wt 82.6 kg (182 lb)   SpO2 98%   BMI 32.24 kg/m    Body mass index is 32.24 kg/m .  Physical Exam   GENERAL: healthy, alert and no distress  EYES: Eyes grossly normal to inspection, PERRL and conjunctivae and sclerae normal  HENT: ear canals and TM's normal, nose and mouth without ulcers or lesions  SKIN: hair qualitythin  NEURO: Normal strength and tone, mentation intact and speech normal  PSYCH: mentation appears normal, affect normal/bright    Diagnostic Test Results:  Labs reviewed in Epic  Results for orders placed or performed in visit on 08/14/20   Hemoglobin A1c     Status: Abnormal   Result Value Ref Range    Hemoglobin A1C 9.0 (H) 0 - 5.6 %   Basic metabolic panel  (Ca, Cl, CO2, Creat, Gluc, K, Na, BUN)     Status: Abnormal   Result Value Ref Range    Sodium 139 133 - 144 mmol/L    Potassium 4.3 3.4 - 5.3 mmol/L    Chloride 110 (H) 94 - 109 mmol/L    Carbon Dioxide 23 20 - 32 mmol/L    Anion Gap 6 3 - 14 mmol/L    Glucose 227 (H) 70 - 99 mg/dL    Urea Nitrogen 26 7 - 30 mg/dL    Creatinine 1.35 (H) 0.52 - 1.04 mg/dL    GFR Estimate 51 (L) >60 mL/min/[1.73_m2]    GFR Estimate If Black 59 (L) " >60 mL/min/[1.73_m2]    Calcium 8.7 8.5 - 10.1 mg/dL   Ferritin     Status: None   Result Value Ref Range    Ferritin 27 12 - 150 ng/mL   TSH with free T4 reflex     Status: None   Result Value Ref Range    TSH 0.61 0.40 - 4.00 mU/L           Assessment & Plan     1. Uncontrolled type 2 diabetes mellitus with diabetic polyneuropathy, with long-term current use of insulin (H)  -uncontrolled, added Novolog sliding scale insulin  -follow up in 1-2 months   - insulin glargine (BASAGLAR KWIKPEN) 100 UNIT/ML pen; Inject 25 Units Subcutaneous daily  Dispense: 9 mL; Refill: 2  - Hemoglobin A1c  - Basic metabolic panel  (Ca, Cl, CO2, Creat, Gluc, K, Na, BUN)  - insulin aspart (NOVOLOG PEN) 100 UNIT/ML pen; Inject 1-5 Units Subcutaneous 3 times daily (with meals) Per sliding scale  Dispense: 9 mL; Refill: 1    2. Hair loss  -recommended to try over the counter Ferrous sulfate 65 mg daily for 1 month and Biotin supplement   - Ferritin  - TSH with free T4 reflex       See Patient Instructions    Return in about 6 weeks (around 9/25/2020) for Routine Visit.    VERONIQUE Spears [facility]

## 2020-08-19 ENCOUNTER — MEDICAL CORRESPONDENCE (OUTPATIENT)
Dept: HEALTH INFORMATION MANAGEMENT | Facility: CLINIC | Age: 35
End: 2020-08-19

## 2020-08-19 ENCOUNTER — TELEPHONE (OUTPATIENT)
Dept: FAMILY MEDICINE | Facility: CLINIC | Age: 35
End: 2020-08-19

## 2020-08-19 RX ORDER — BIOTIN 5 MG
5 TABLET ORAL
Qty: 30 TABLET | Refills: 1 | COMMUNITY
Start: 2020-08-19 | End: 2020-10-15

## 2020-08-27 ENCOUNTER — OFFICE VISIT (OUTPATIENT)
Dept: FAMILY MEDICINE | Facility: CLINIC | Age: 35
End: 2020-08-27
Payer: MEDICARE

## 2020-08-27 VITALS
HEART RATE: 88 BPM | SYSTOLIC BLOOD PRESSURE: 118 MMHG | BODY MASS INDEX: 31.98 KG/M2 | WEIGHT: 180.5 LBS | RESPIRATION RATE: 16 BRPM | TEMPERATURE: 99.5 F | DIASTOLIC BLOOD PRESSURE: 60 MMHG | OXYGEN SATURATION: 98 % | HEIGHT: 63 IN

## 2020-08-27 DIAGNOSIS — G56.02 CARPAL TUNNEL SYNDROME OF LEFT WRIST: Primary | ICD-10-CM

## 2020-08-27 PROCEDURE — 99213 OFFICE O/P EST LOW 20 MIN: CPT | Performed by: NURSE PRACTITIONER

## 2020-08-27 ASSESSMENT — MIFFLIN-ST. JEOR: SCORE: 1487.87

## 2020-08-27 NOTE — PATIENT INSTRUCTIONS
Carpal tunnel     Recommend brace at night time, can take off during day time    Carpal tunnel exercises   1. Make a fist. Slide your fingers upward until they are pointing up straight. Repeat five to 10 times.    2. Make a fist. Release your hand and fan out your fingers, stretching them as far as you can. Repeat five to 10 times.  Treatment  Wear wrist brace at night time.   Avoid day time activities that may provoke symptoms, such as repetitive motions, take frequent breaks from tasks.

## 2020-08-27 NOTE — PROGRESS NOTES
"Subjective     Divina Delgadillo is a 35 year old female who presents to clinic today for the following health issues:    HPI       Concern - Edema   Onset: 2 months   Description: She states when she wakes up in the morning her left hand has been swollen. The swelling is usually gone by the time she gets to work.   Intensity: mild no pain, there is a numbness feeling.   Progression of Symptoms:  same  Accompanying Signs & Symptoms: She has had bruising on her stomach from her insulin pen ever since she started Novolog on 8/14.   Previous history of similar problem: none  Precipitating factors:        Worsened by: none   Alleviating factors:        Improved by: none   Therapies tried and outcome: None      Review of Systems   Constitutional, HEENT, cardiovascular, pulmonary, gi and gu systems are negative, except as otherwise noted.      Objective    /60 (BP Location: Right arm, Patient Position: Sitting, Cuff Size: Adult Regular)   Pulse 88   Temp 99.5  F (37.5  C) (Tympanic)   Resp 16   Ht 1.6 m (5' 3\")   Wt 81.9 kg (180 lb 8 oz)   SpO2 98%   BMI 31.97 kg/m    Body mass index is 31.97 kg/m .  Physical Exam   GENERAL: healthy, alert and no distress  EYES: Eyes grossly normal to inspection, PERRL and conjunctivae and sclerae normal  MS: no gross musculoskeletal defects noted, no edema  SKIN: no suspicious lesions or rashes  NEURO: Normal strength and tone, mentation intact and speech normal. Positive Tinel's sign left hand   PSYCH: mentation appears normal, affect normal/bright            Assessment & Plan     Carpal tunnel syndrome of left wrist  -wrist brace at night  -stretching exercises per AVS  -Tylenol 500 mg 4 times daily as needed for pain          See Patient Instructions      VERONIQUE Spears [facility]    "

## 2020-09-08 DIAGNOSIS — J45.21 MILD INTERMITTENT ASTHMA WITH ACUTE EXACERBATION: ICD-10-CM

## 2020-09-08 NOTE — TELEPHONE ENCOUNTER
"Requested Prescriptions   Pending Prescriptions Disp Refills     montelukast (SINGULAIR) 10 MG tablet 30 tablet 5     Sig: Take 1 tablet (10 mg) by mouth At Bedtime       Leukotriene Inhibitors Protocol Passed - 9/8/2020  3:05 PM        Passed - Patient is age 12 or older     If patient is under 16, ok to refill using age based dosing.           Passed - Asthma control assessment score within normal limits in last 6 months     Please review ACT score.           Passed - Medication is active on med list        Passed - Recent (6 mo) or future (30 days) visit within the authorizing provider's specialty     Patient had office visit in the last 6 months or has a visit in the next 30 days with authorizing provider or within the authorizing provider's specialty.  See \"Patient Info\" tab in inbasket, or \"Choose Columns\" in Meds & Orders section of the refill encounter.                 "

## 2020-09-10 RX ORDER — MONTELUKAST SODIUM 10 MG/1
10 TABLET ORAL AT BEDTIME
Qty: 30 TABLET | Refills: 5 | Status: SHIPPED | OUTPATIENT
Start: 2020-09-10 | End: 2021-03-04

## 2020-09-15 RX ORDER — FERROUS SULFATE 325(65) MG
TABLET ORAL
Qty: 30 TABLET | Refills: 10 | OUTPATIENT
Start: 2020-09-15

## 2020-09-15 NOTE — TELEPHONE ENCOUNTER
"Requested Prescriptions   Pending Prescriptions Disp Refills     FEROSUL 325 (65 Fe) MG tablet [Pharmacy Med Name: FeroSul 325 (65 Fe) MG Tablet] 30 tablet 10     Sig: TAKE ONE TABLET BY MOUTH DAILY WITH MEAL FOR 1 MONTH       Iron Supplements Failed - 9/15/2020  8:45 AM        Failed - Medication is active on med list        Passed - Patient is 12 years of age or older        Passed - Recent (12 mo) or future (30 days) visit within the authorizing provider's specialty     Patient has had an office visit with the authorizing provider or a provider within the authorizing providers department within the previous 12 mos or has a future within next 30 days. See \"Patient Info\" tab in inbasket, or \"Choose Columns\" in Meds & Orders section of the refill encounter.              Passed - Hgb OR Hct on record within the past 12 mos.     Patient need only have had a HGB or HCT on file in the past 12 mos. That result does not need to be normal.    Recent Labs   Lab Test 07/09/20  1418 05/25/20  1330 02/17/20  1440   HGB 12.0  Test canceled - Lab  error 12.2 12.3     Recent Labs   Lab Test 07/09/20  1418 05/25/20  1330 02/17/20  1440   HCT 37.9  Test canceled - Lab  error 38.3 39.8       Please verify a HGB or HCT has been checked SINCE THE LAST DOSE CHANGE.                 "

## 2020-09-30 DIAGNOSIS — K21.9 GASTROESOPHAGEAL REFLUX DISEASE WITHOUT ESOPHAGITIS: ICD-10-CM

## 2020-09-30 DIAGNOSIS — E11.22 TYPE 2 DIABETES MELLITUS WITH STAGE 3B CHRONIC KIDNEY DISEASE, WITH LONG-TERM CURRENT USE OF INSULIN (H): ICD-10-CM

## 2020-09-30 DIAGNOSIS — I10 ESSENTIAL HYPERTENSION: ICD-10-CM

## 2020-09-30 DIAGNOSIS — Z79.4 TYPE 2 DIABETES MELLITUS WITH STAGE 3B CHRONIC KIDNEY DISEASE, WITH LONG-TERM CURRENT USE OF INSULIN (H): ICD-10-CM

## 2020-09-30 DIAGNOSIS — N18.32 TYPE 2 DIABETES MELLITUS WITH STAGE 3B CHRONIC KIDNEY DISEASE, WITH LONG-TERM CURRENT USE OF INSULIN (H): ICD-10-CM

## 2020-09-30 NOTE — TELEPHONE ENCOUNTER
"Requested Prescriptions   Pending Prescriptions Disp Refills     omeprazole (PRILOSEC) 20 MG DR capsule [Pharmacy Med Name: Omeprazole 20 MG Capsule delayed release] 28 capsule 10     Sig: TAKE ONE  CAPSULE BY MOUTH DAILY       PPI Protocol Passed - 9/30/2020 12:26 PM        Passed - Not on Clopidogrel (unless Pantoprazole ordered)        Passed - No diagnosis of osteoporosis on record        Passed - Recent (12 mo) or future (30 days) visit within the authorizing provider's specialty     Patient has had an office visit with the authorizing provider or a provider within the authorizing providers department within the previous 12 mos or has a future within next 30 days. See \"Patient Info\" tab in inbasket, or \"Choose Columns\" in Meds & Orders section of the refill encounter.              Passed - Medication is active on med list        Passed - Patient is age 18 or older        Passed - No active pregnacy on record        Passed - No positive pregnancy test in past 12 months           lisinopril (ZESTRIL) 5 MG tablet [Pharmacy Med Name: Lisinopril 5 MG Tablet] 28 tablet 10     Sig: TAKE ONE TABLET BY MOUTH DAILY       ACE Inhibitors (Including Combos) Protocol Failed - 9/30/2020 12:26 PM        Failed - Normal serum creatinine on file in past 12 months     Recent Labs   Lab Test 08/14/20  1546  09/01/17  1049   CR 1.35*   < >  --    CREAT  --   --  1.6*    < > = values in this interval not displayed.       Ok to refill medication if creatinine is low          Passed - Blood pressure under 140/90 in past 12 months     BP Readings from Last 3 Encounters:   08/27/20 118/60   08/14/20 116/58   08/06/20 128/62                 Passed - Recent (12 mo) or future (30 days) visit within the authorizing provider's specialty     Patient has had an office visit with the authorizing provider or a provider within the authorizing providers department within the previous 12 mos or has a future within next 30 days. See \"Patient Info\" " "tab in inbasket, or \"Choose Columns\" in Meds & Orders section of the refill encounter.              Passed - Medication is active on med list        Passed - Patient is age 18 or older        Passed - No active pregnancy on record        Passed - Normal serum potassium on file in past 12 months     Recent Labs   Lab Test 08/14/20  1546   POTASSIUM 4.3             Passed - No positive pregnancy test within past 12 months             "

## 2020-10-02 RX ORDER — LISINOPRIL 5 MG/1
TABLET ORAL
Qty: 30 TABLET | Refills: 3 | Status: SHIPPED | OUTPATIENT
Start: 2020-10-02 | End: 2021-12-03

## 2020-10-02 NOTE — TELEPHONE ENCOUNTER
Routing refill request to provider for review/approval because:  Labs out of range:  Cr    Omeprazole prescription approved per [facility] Refill Protocol.      Mere KUNZ RN BSN

## 2020-10-23 DIAGNOSIS — E11.22 TYPE 2 DIABETES MELLITUS WITH STAGE 3B CHRONIC KIDNEY DISEASE, WITH LONG-TERM CURRENT USE OF INSULIN (H): ICD-10-CM

## 2020-10-23 DIAGNOSIS — Z79.4 TYPE 2 DIABETES MELLITUS WITH STAGE 3B CHRONIC KIDNEY DISEASE, WITH LONG-TERM CURRENT USE OF INSULIN (H): ICD-10-CM

## 2020-10-23 DIAGNOSIS — N18.32 TYPE 2 DIABETES MELLITUS WITH STAGE 3B CHRONIC KIDNEY DISEASE, WITH LONG-TERM CURRENT USE OF INSULIN (H): ICD-10-CM

## 2020-10-23 NOTE — TELEPHONE ENCOUNTER
Routing refill request to provider for review/approval because:  Labs out of range:  Vivek KUNZ RN BSN

## 2020-10-23 NOTE — TELEPHONE ENCOUNTER
"Requested Prescriptions   Pending Prescriptions Disp Refills     OZEMPIC (1 MG/DOSE) 2 MG/1.5ML pen [Pharmacy Med Name: Ozempic (1 MG/DOSE) 2 MG/1.5ML Solution pen-injector] 3 mL 10     Sig: INJECT 1MG SUB-Q EVERY 7 DAYS (ON THURSDAYS)       GLP-1 Agonists Protocol Failed - 10/23/2020 10:03 AM        Failed - Normal serum creatinine on file in past 12 months     Recent Labs   Lab Test 08/14/20  1546 09/01/17  1049 09/01/17  1049   CR 1.35*   < >  --    CREAT  --   --  1.6*    < > = values in this interval not displayed.       Ok to refill medication if creatinine is low          Passed - HgbA1C in past 3 or 6 months     If HgbA1C is 8 or greater, it needs to be on file within the past 3 months.  If less than 8, must be on file within the past 6 months.     Recent Labs   Lab Test 08/14/20  1546   A1C 9.0*             Passed - Medication is active on med list        Passed - Patient is age 18 or older        Passed - No active pregnancy on record        Passed - No positive pregnancy test in past 12 months        Passed - Recent (6 mo) or future (30 days) visit within the authorizing provider's specialty     Patient had office visit in the last 6 months or has a visit in the next 30 days with authorizing provider.  See \"Patient Info\" tab in inbasket, or \"Choose Columns\" in Meds & Orders section of the refill encounter.                 "

## 2020-10-26 RX ORDER — SEMAGLUTIDE 1.34 MG/ML
INJECTION, SOLUTION SUBCUTANEOUS
Qty: 3 ML | Refills: 3 | Status: SHIPPED | OUTPATIENT
Start: 2020-10-26 | End: 2021-04-20

## 2020-10-27 ENCOUNTER — TELEPHONE (OUTPATIENT)
Dept: FAMILY MEDICINE | Facility: CLINIC | Age: 35
End: 2020-10-27

## 2020-10-27 DIAGNOSIS — G47.00 INSOMNIA, UNSPECIFIED TYPE: Primary | ICD-10-CM

## 2020-10-27 NOTE — TELEPHONE ENCOUNTER
Reason for call:  Patient reporting a symptom    Symptom or request: Do calling with [employer] For 2 requests for pt:  1.  Consult with Sleep Specialist for insomnia.    2.  Pt was taking Biotin and wants to know if she can continue to take med for hair and nail growth? Rx to [facility]  Please call Do and advise.      Duration (how long have symptoms been present): ongoing    Have you been treated for this before? Yes    Additional comments:     Phone Number Do can be reached at:  Cell number on file:    Telephone Information:   Mobile [phone]     Best Time:  any    Can we leave a detailed message on this number:  YES    Call taken on 10/27/2020 at 1:00 PM by Dolly Lundberg

## 2020-10-27 NOTE — TELEPHONE ENCOUNTER
Jaleesa,    Looks like we already sent the biotin Rx to that pharmacy.  I have pended the sleep study referral for your consideration. Debora LOPEZ RN

## 2020-10-29 DIAGNOSIS — J30.2 CHRONIC SEASONAL ALLERGIC RHINITIS: ICD-10-CM

## 2020-10-29 NOTE — TELEPHONE ENCOUNTER
"Requested Prescriptions   Pending Prescriptions Disp Refills     loratadine (CLARITIN) 10 MG tablet [Pharmacy Med Name: Loratadine 10 MG Tablet] 28 tablet 10     Sig: TAKE ONE TABLET BY MOUTH EVERY MORNING       Antihistamines Protocol Passed - 10/29/2020  9:26 AM        Passed - Patient is 3-64 years of age     Apply weight-based dosing for peds patients age 3 - 12 years of age.    Forward request to provider for patients under the age of 3 or over the age of 64.          Passed - Recent (12 mo) or future (30 days) visit within the authorizing provider's specialty     Patient has had an office visit with the authorizing provider or a provider within the authorizing providers department within the previous 12 mos or has a future within next 30 days. See \"Patient Info\" tab in inbasket, or \"Choose Columns\" in Meds & Orders section of the refill encounter.              Passed - Medication is active on med list             "

## 2020-11-02 RX ORDER — LORATADINE 10 MG/1
TABLET ORAL
Qty: 28 TABLET | Refills: 5 | Status: SHIPPED | OUTPATIENT
Start: 2020-11-02 | End: 2021-05-13

## 2020-11-02 NOTE — TELEPHONE ENCOUNTER
Prescription approved per [facility] [place]ill Protocol.    Ramona CHEN RN, BSN

## 2020-11-03 ENCOUNTER — OFFICE VISIT (OUTPATIENT)
Dept: URGENT CARE | Facility: URGENT CARE | Age: 35
End: 2020-11-03
Payer: MEDICARE

## 2020-11-03 VITALS
OXYGEN SATURATION: 97 % | BODY MASS INDEX: 33.24 KG/M2 | HEIGHT: 63 IN | RESPIRATION RATE: 16 BRPM | HEART RATE: 91 BPM | WEIGHT: 187.6 LBS | TEMPERATURE: 97.3 F | DIASTOLIC BLOOD PRESSURE: 60 MMHG | SYSTOLIC BLOOD PRESSURE: 110 MMHG

## 2020-11-03 DIAGNOSIS — R07.81 RIB PAIN ON RIGHT SIDE: Primary | ICD-10-CM

## 2020-11-03 LAB
ALBUMIN UR-MCNC: NEGATIVE MG/DL
APPEARANCE UR: CLEAR
BILIRUB UR QL STRIP: NEGATIVE
COLOR UR AUTO: YELLOW
GLUCOSE UR STRIP-MCNC: NEGATIVE MG/DL
HGB UR QL STRIP: NEGATIVE
KETONES UR STRIP-MCNC: NEGATIVE MG/DL
LEUKOCYTE ESTERASE UR QL STRIP: NEGATIVE
NITRATE UR QL: NEGATIVE
PH UR STRIP: 6 PH (ref 5–7)
SOURCE: NORMAL
SP GR UR STRIP: 1.02 (ref 1–1.03)
UROBILINOGEN UR STRIP-ACNC: 0.2 EU/DL (ref 0.2–1)

## 2020-11-03 PROCEDURE — 81003 URINALYSIS AUTO W/O SCOPE: CPT | Performed by: PHYSICIAN ASSISTANT

## 2020-11-03 PROCEDURE — 99213 OFFICE O/P EST LOW 20 MIN: CPT | Performed by: PHYSICIAN ASSISTANT

## 2020-11-03 ASSESSMENT — ENCOUNTER SYMPTOMS
NAUSEA: 0
VOMITING: 0
UNEXPECTED WEIGHT CHANGE: 0
FLANK PAIN: 1
SINUS PRESSURE: 0
CHEST TIGHTNESS: 0
FATIGUE: 0
CHILLS: 0
BACK PAIN: 0
RHINORRHEA: 0
EYE REDNESS: 0
EYE PAIN: 0
FEVER: 0
PALPITATIONS: 0
SHORTNESS OF BREATH: 0
SORE THROAT: 0
ABDOMINAL PAIN: 0
WHEEZING: 0
MYALGIAS: 0
DIARRHEA: 0
COUGH: 0
TROUBLE SWALLOWING: 0
ARTHRALGIAS: 0

## 2020-11-03 ASSESSMENT — MIFFLIN-ST. JEOR: SCORE: 1520.08

## 2020-11-03 NOTE — PROGRESS NOTES
SUBJECTIVE:   Divina Delgadillo is a 35 year old female presenting with a chief complaint of   Chief Complaint   Patient presents with     Flank Pain     1 week having sharp pain that comes and goes, aches all the time on both side.       She is an established patient of [facility].    MS Injury/Pain    Onset of symptoms was 1 week(s) ago.  Location: right side  Context:       The injury happened while no known injury       Mechanism: none      Patient experienced delayed pain  Course of symptoms is same.    Severity moderate  Current and Associated symptoms: Pain  Denies injury, or frequency, urgency, burning   Aggravating Factors: none  Therapies to improve symptoms include: Tylenol  Patient is wondering about kidney stones    Review of Systems   Constitutional: Negative for chills, fatigue, fever and unexpected weight change.   HENT: Negative for congestion, ear pain, postnasal drip, rhinorrhea, sinus pressure, sore throat and trouble swallowing.    Eyes: Negative for pain, redness and visual disturbance.   Respiratory: Negative for cough, chest tightness, shortness of breath and wheezing.    Cardiovascular: Negative for chest pain and palpitations.   Gastrointestinal: Negative for abdominal pain, diarrhea, nausea and vomiting.   Genitourinary: Positive for flank pain.   Musculoskeletal: Negative for arthralgias, back pain and myalgias.   Skin: Negative for rash.       Past Medical History:   Diagnosis Date     Cervical high risk HPV (human papillomavirus) test positive 6/20/2017     Depressive disorder, not elsewhere classified      DVT (deep venous thrombosis) (H)      Dysmetabolic syndrome X      Esophageal reflux     GERD     Mild intermittent asthma      Other abnormal heart sounds     Heart murmur     Other specified types of schizophrenia, unspecified condition      Pancreatic cancer (H)     left adrenal gland, partial pancreas, and spleen removed; no chemo or radiation.      Type II or unspecified type  diabetes mellitus without mention of complication, not stated as uncontrolled      Unspecified disorder of tympanic membrane      Family History   Problem Relation Age of Onset     Respiratory Mother         ASTHMA     Allergies Mother      Depression Mother      Heart Disease Father         HEART VALVE REPLACEMENT     Hypertension Father      Diabetes Father      Depression Father      Respiratory Brother      Allergies Brother      Respiratory Sister      Allergies Sister      Depression Sister      Respiratory Sister      Allergies Sister      Depression Sister      Kidney Disease No family hx of      Current Outpatient Medications   Medication Sig Dispense Refill     amLODIPine (NORVASC) 5 MG tablet TAKE 2 TABLETS BY MOUTH EVERY DAY 60 tablet 5     ARIPiprazole (ABILIFY) 30 MG tablet Take 30 mg by mouth daily       atorvastatin (LIPITOR) 10 MG tablet Take 1 tablet (10 mg) by mouth daily 90 tablet 1     BIOTIN FORTE 5 MG TABS TAKE ONE TABLET BY MOUTH DAILY FOR 2 MONTHS 30 tablet 1     blood glucose (ACCU-CHEK GUIDE) test strip Use to test blood sugar 3 times daily (accu chek GUIDE). 150 strip 1     blood glucose monitoring (ACCU-CHEK FASTCLIX) lancets Use to test blood sugar 3 times daily 102 each 3     carvedilol (COREG) 6.25 MG tablet Take 1 tablet (6.25 mg) by mouth 2 times daily 60 tablet 3     cloZAPine (CLOZARIL) 100 MG tablet Take 150 mg by mouth 2 times daily        FLUoxetine (PROZAC) 20 MG capsule Take 20 mg by mouth daily       insulin aspart (NOVOLOG PEN) 100 UNIT/ML pen Inject 1-5 Units Subcutaneous 3 times daily (with meals) Per sliding scale 9 mL 1     insulin glargine (BASAGLAR KWIKPEN) 100 UNIT/ML pen Inject 25 Units Subcutaneous daily 9 mL 2     lamoTRIgine (LAMICTAL) 100 MG tablet Take 100 mg by mouth At Bedtime       lisinopril (ZESTRIL) 5 MG tablet TAKE ONE TABLET BY MOUTH DAILY 30 tablet 3     loratadine (CLARITIN) 10 MG tablet TAKE ONE TABLET BY MOUTH EVERY MORNING 28 tablet 5      "montelukast (SINGULAIR) 10 MG tablet Take 1 tablet (10 mg) by mouth At Bedtime 30 tablet 5     omeprazole (PRILOSEC) 20 MG DR capsule TAKE ONE  CAPSULE BY MOUTH DAILY 28 capsule 4     OZEMPIC, 1 MG/DOSE, 2 MG/1.5ML pen INJECT 1MG SUB-Q EVERY 7 DAYS (ON ) 3 mL 3     calcium carbonate (TUMS) 500 MG chewable tablet Take 1 tablet (500 mg) by mouth 3 times daily as needed for heartburn (Patient not taking: Reported on 2020) 100 tablet 0     order for DME Tr[employer]ok Ankle Brace (Patient not taking: Reported on 2020) 2 Device 0     polyethylene glycol (MIRALAX) 17 g packet Take 17 g by mouth daily as needed for constipation (Patient not taking: Reported on 2020) 10 packet 1     VENTOLIN  (90 Base) MCG/ACT inhaler inhale 2 puffs every 6 hours as needed for shortness of breath (Patient not taking: Reported on 2020) 18 g 3     Social History     Tobacco Use     Smoking status: Current Every Day Smoker     Packs/day: 1.00     Years: 4.00     Pack years: 4.00     Types: Cigarettes     Last attempt to quit: 2019     Years since quittin.3     Smokeless tobacco: Never Used     Tobacco comment: 15 cigarettes a day    Substance Use Topics     Alcohol use: No       OBJECTIVE  /60 (BP Location: Right arm, Patient Position: Sitting, Cuff Size: Adult Regular)   Pulse 91   Temp 97.3  F (36.3  C) (Tympanic)   Resp 16   Ht 1.6 m (5' 3\")   Wt 85.1 kg (187 lb 9.6 oz)   SpO2 97%   BMI 33.23 kg/m      Physical Exam  Constitutional:       Appearance: She is well-developed.   HENT:      Head: Normocephalic.      Right Ear: Tympanic membrane and ear canal normal.      Left Ear: Tympanic membrane and ear canal normal.   Eyes:      Conjunctiva/sclera: Conjunctivae normal.      Pupils: Pupils are equal, round, and reactive to light.   Cardiovascular:      Rate and Rhythm: Normal rate.      Heart sounds: Normal heart sounds.   Pulmonary:      Effort: Pulmonary effort is normal.      Breath sounds: " Normal breath sounds.   Abdominal:      General: Bowel sounds are normal.      Palpations: Abdomen is soft.      Tenderness: There is no abdominal tenderness. There is no right CVA tenderness or left CVA tenderness.   Musculoskeletal:        Back:       Comments: There is tenderness with palpation of right posterior ribs. No visible bruising. No step offs or crepitus    Skin:     General: Skin is warm and dry.      Findings: No rash.   Psychiatric:         Behavior: Behavior normal.         Labs:  Results for orders placed or performed in visit on 11/03/20 (from the past 24 hour(s))   *UA reflex to Microscopic and Culture ([place] [facility] (except [place] and [place])    Specimen: Midstream Urine   Result Value Ref Range    Color Urine Yellow     Appearance Urine Clear     Glucose Urine Negative NEG^Negative mg/dL    Bilirubin Urine Negative NEG^Negative    Ketones Urine Negative NEG^Negative mg/dL    Specific Gravity Urine 1.025 1.003 - 1.035    Blood Urine Negative NEG^Negative    pH Urine 6.0 5.0 - 7.0 pH    Protein Albumin Urine Negative NEG^Negative mg/dL    Urobilinogen Urine 0.2 0.2 - 1.0 EU/dL    Nitrite Urine Negative NEG^Negative    Leukocyte Esterase Urine Negative NEG^Negative    Source Midstream Urine        X-Ray was not done.    ASSESSMENT:      ICD-10-CM    1. Rib pain on right side  R07.81 *UA reflex to Microscopic and Culture ([facility] [facility] (except [facility])        Medical Decision Making:    Differential Diagnosis:  MS Injury Pain: sprain, muscle strain, contusion and kidney stones     Serious Comorbid Conditions:  Adult:  None    PLAN:    Reassurance, UA is within normal limits. Patient has tenderness with palpation of right posterior rib cage. Continue with supportive care, tylenol/ibuprofen as needed. Encouraged gentle stretching/ROM. Return to clinic if symptoms worsen or do not improve; otherwise follow up as needed        Followup:    If not  improving or if condition worsens, follow up with your Primary Care Provider    There are no Patient Instructions on file for this visit.

## 2020-11-09 PROBLEM — E66.01 MORBID OBESITY (H): Chronic | Status: ACTIVE | Noted: 2018-01-09

## 2020-11-09 PROBLEM — J45.21 MILD INTERMITTENT ASTHMA WITH ACUTE EXACERBATION: Chronic | Status: ACTIVE | Noted: 2018-01-16

## 2020-11-09 PROBLEM — E11.22 TYPE 2 DIABETES MELLITUS WITH STAGE 3 CHRONIC KIDNEY DISEASE, WITH LONG-TERM CURRENT USE OF INSULIN (H): Chronic | Status: ACTIVE | Noted: 2017-02-27

## 2020-11-09 PROBLEM — J45.909 ASTHMA: Chronic | Status: ACTIVE | Noted: 2020-05-26

## 2020-11-09 PROBLEM — Z79.4 TYPE 2 DIABETES MELLITUS WITH STAGE 3 CHRONIC KIDNEY DISEASE, WITH LONG-TERM CURRENT USE OF INSULIN (H): Chronic | Status: ACTIVE | Noted: 2017-02-27

## 2020-11-09 PROBLEM — N18.30 TYPE 2 DIABETES MELLITUS WITH STAGE 3 CHRONIC KIDNEY DISEASE, WITH LONG-TERM CURRENT USE OF INSULIN (H): Chronic | Status: ACTIVE | Noted: 2017-02-27

## 2020-11-09 PROBLEM — F25.1 SCHIZOAFFECTIVE DISORDER, DEPRESSIVE TYPE (H): Chronic | Status: ACTIVE | Noted: 2019-07-19

## 2020-11-16 ENCOUNTER — APPOINTMENT (OUTPATIENT)
Dept: OCCUPATIONAL MEDICINE | Facility: CLINIC | Age: 35
End: 2020-11-16

## 2020-11-16 PROBLEM — N17.9 ARF (ACUTE RENAL FAILURE) (H): Status: RESOLVED | Noted: 2019-03-23 | Resolved: 2020-11-16

## 2020-11-16 PROBLEM — N17.9 ACUTE-ON-CHRONIC KIDNEY INJURY (H): Status: RESOLVED | Noted: 2019-03-22 | Resolved: 2020-11-16

## 2020-11-16 PROBLEM — N18.9 ACUTE-ON-CHRONIC KIDNEY INJURY (H): Status: RESOLVED | Noted: 2019-03-22 | Resolved: 2020-11-16

## 2020-11-16 PROBLEM — M25.562 ACUTE PAIN OF LEFT KNEE: Status: RESOLVED | Noted: 2019-03-22 | Resolved: 2020-11-16

## 2020-11-16 PROCEDURE — 99499 UNLISTED E&M SERVICE: CPT | Performed by: FAMILY MEDICINE

## 2020-11-16 PROCEDURE — 97799 UNLISTED PHYSCL MED/REHAB PX: CPT | Performed by: FAMILY MEDICINE

## 2020-11-16 PROCEDURE — 99000 SPECIMEN HANDLING OFFICE-LAB: CPT | Performed by: FAMILY MEDICINE

## 2020-11-17 ENCOUNTER — VIRTUAL VISIT (OUTPATIENT)
Dept: SLEEP MEDICINE | Facility: CLINIC | Age: 35
End: 2020-11-17
Attending: NURSE PRACTITIONER
Payer: MEDICARE

## 2020-11-17 VITALS — HEIGHT: 63 IN | WEIGHT: 187.6 LBS | BODY MASS INDEX: 33.24 KG/M2

## 2020-11-17 DIAGNOSIS — R53.81 MALAISE AND FATIGUE: ICD-10-CM

## 2020-11-17 DIAGNOSIS — G47.00 INSOMNIA, UNSPECIFIED TYPE: ICD-10-CM

## 2020-11-17 DIAGNOSIS — I10 ESSENTIAL HYPERTENSION: ICD-10-CM

## 2020-11-17 DIAGNOSIS — E66.811 CLASS 1 OBESITY DUE TO EXCESS CALORIES WITH BODY MASS INDEX (BMI) OF 32.0 TO 32.9 IN ADULT, UNSPECIFIED WHETHER SERIOUS COMORBIDITY PRESENT: ICD-10-CM

## 2020-11-17 DIAGNOSIS — R06.00 DYSPNEA AND RESPIRATORY ABNORMALITY: Primary | ICD-10-CM

## 2020-11-17 DIAGNOSIS — R53.83 MALAISE AND FATIGUE: ICD-10-CM

## 2020-11-17 DIAGNOSIS — R06.89 DYSPNEA AND RESPIRATORY ABNORMALITY: Primary | ICD-10-CM

## 2020-11-17 DIAGNOSIS — Z72.820 LACK OF ADEQUATE SLEEP: ICD-10-CM

## 2020-11-17 DIAGNOSIS — E66.09 CLASS 1 OBESITY DUE TO EXCESS CALORIES WITH BODY MASS INDEX (BMI) OF 32.0 TO 32.9 IN ADULT, UNSPECIFIED WHETHER SERIOUS COMORBIDITY PRESENT: ICD-10-CM

## 2020-11-17 PROCEDURE — 99443 PR PHYSICIAN TELEPHONE EVALUATION 21-30 MIN: CPT | Performed by: PHYSICIAN ASSISTANT

## 2020-11-17 RX ORDER — ZOLPIDEM TARTRATE 5 MG/1
TABLET ORAL
Qty: 1 TABLET | Refills: 0 | Status: SHIPPED | OUTPATIENT
Start: 2020-11-17 | End: 2021-03-26

## 2020-11-17 ASSESSMENT — MIFFLIN-ST. JEOR: SCORE: 1520.08

## 2020-11-17 NOTE — PATIENT INSTRUCTIONS
Your BMI is Body mass index is 33.23 kg/m .  Weight management is a personal decision.  If you are interested in exploring weight loss strategies, the following discussion covers the approaches that may be successful. Body mass index (BMI) is one way to tell whether you are at a healthy weight, overweight, or obese. It measures your weight in relation to your height.  A BMI of 18.5 to 24.9 is in the healthy range. A person with a BMI of 25 to 29.9 is considered overweight, and someone with a BMI of 30 or greater is considered obese. More than two-thirds of [place] adults are considered overweight or obese.  Being overweight or obese increases the risk for further weight gain. Excess weight may lead to heart disease and diabetes.  Creating and following plans for healthy eating and physical activity may help you improve your health.  Weight control is part of healthy lifestyle and includes exercise, emotional health, and healthy eating habits. Careful eating habits lifelong are the mainstay of weight control. Though there are significant health benefits from weight loss, long-term weight loss with diet alone may be very difficult to achieve- studies show long-term success with dietary management in less than 10% of people. Attaining a healthy weight may be especially difficult to achieve in those with severe obesity. In some cases, medications, devices and surgical management might be considered.  What can you do?  If you are overweight or obese and are interested in methods for weight loss, you should discuss this with your provider.     Consider reducing daily calorie intake by 500 calories.     Keep a food journal.     Avoiding skipping meals, consider cutting portions instead.    Diet combined with exercise helps maintain muscle while optimizing fat loss. Strength training is particularly important for building and maintaining muscle mass. Exercise helps reduce stress, increase energy, and improves fitness.  Increasing exercise without diet control, however, may not burn enough calories to loose weight.       Start walking three days a week 10-20 minutes at a time    Work towards walking thirty minutes five days a week     Eventually, increase the speed of your walking for 1-2 minutes at time    In addition, we recommend that you review healthy lifestyles and methods for weight loss available through the National Institutes of Health patient information sites:  http://win.niddk.nih.gov/publications/index.htm    And look into health and wellness programs that may be available through your health insurance provider, employer, local community center, or [employer].    Weight management plan: Patient was referred to their PCP to discuss a diet and exercise plan.   MY CONTACT NUMBERS ARE:   DOCTOR : TREVOR Javier  SLEEP CENTER :   CPAP EQUIPMENT :    IF I HAVE SLEEP APNEA.....  WHERE CAN I FIND MORE INFORMATION?    American Academy of Sleep Medicine Patient information on sleep disorders:  http://yoursleep.aasmnet.org    THINGS TO REMEMBER  In most situations, sleep apnea is a lifelong disease that must be managed with daily therapy. Untreated disease, when severe, may result in an increased risk for an array of problems from heart disease to mood changes, car accidents and shorter lifespan.    CPAP -  WHY AND HOW?  Continuous positive airway pressure, or CPAP, is the most effective treatment for obstructive sleep apnea. A decision to use CPAP is a major step forward in the pursuit of a healthier life. The successful use of CPAP will help you breathe easier, sleep better and live healthier. Using CPAP can be a positive experience if you keep these rodriguez points in mind:  1. Commitment  CPAP is not a quick fix for your problem. It involves a long-term commitment to improve your sleep and your health.    2. Communication  Stay in close communication with both your sleep doctor and your CPAP supplier. Ask lots of questions  "and seek help when you need it.    3. Consistency  Use CPAP all night, every night and for every nap. You will receive the maximum health benefits from CPAP when you use it every time that you sleep. This will also make it easier for your body to adjust to the treatment.    4. Correction  The first machine and mask that you try may not be the best ones for you. Work with your sleep doctor and your CPAP supplier to make corrections to your equipment selection. Ask about trying a different type of machine or mask if you have ongoing problems. Make sure that your mask is a good fit and learn to use your equipment properly.    5. Challenge  Tell a family member or close friend to ask you each morning if you used your CPAP the previous night. Have someone to challenge you to give it your best effort.    6. Connection   Your adjustment to CPAP will be easier if you are able to connect with others who use the same treatment. Ask your sleep doctor if there is a support group in your area for people who have sleep apnea, or look for one on the Internet.    7. Comfort   Increase your level of comfort by using a saline spray, decongestant or heated humidifier if CPAP irritates your nose, mouth or throat. Use your unit's \"ramp\" setting to slowly get used to the air pressure level. There may be soft pads you can buy that will fit over your mask straps. Look on www.CPAP.com for accessories such as these straps, a pillow contoured for side-sleeping with CPAP, longer hoses, hose covers to reduce condensation, or stands to keep the hose out of your way.                                 8. Cleaning   Clean your mask, tubing and headgear on a regular basis. Put this time in your schedule so that you don't forget to do it. Check and replace the filters for your CPAP unit and humidifier.    9. Completion   Although you are never finished with CPAP therapy, you should reward yourself by celebrating the completion of your first month of " treatment. Expect this first month to be your hardest period of adjustment. It will involve some trial and error as you find the machine, mask and pressure settings that are right for you.    Continuation  After your first month of treatment, continue to make a daily commitment to use your CPAP all night, every night and for every nap.    CPAP-Tips to starting with success:  Begin using your CPAP for short periods of time during the day while you watch TV or read.    Use CPAP every night and for every nap. Using it less often reduces the health benefits and makes it harder for your body to get used to it.    Newer CPAP models are virtually silent; however, if you find the sound of your CPAP machine to be bothersome, place the unit under your bed to dampen the sound.     Make small adjustments to your mask, tubing, straps and headgear until you get the right fit. Tightening the mask may actually worsen the leak.  If it leaves significant marks on your face or irritates the bridge of your nose, it may not be the best mask for you.  Speak with the person who supplied the mask and consider trying other masks.    Use a saline nasal spray to ease mild nasal congestion. Neti-Pot or saline nasal rinses may also help. Nasal gel sprays can help reduce nasal dryness.  Biotene mouthwash can be helpful to protect your teeth if you experience frequent dry mouth.  Dry mouth may be a sign of air escaping out of your mouth or out of the mask in the case of a full face mask.  Speak with your provider if you expect that is the case.     Take a nasal decongestant to relieve more severe nasal or sinus congestion.  Do not use Afrin (oxymetazoline) nasal spray more than 3 days in a row.  Speak with your sleep doctor if your nasal congestion is chronic.    Use a heated humidifier that fits your CPAP model to enhance your breathing comfort. Adjust the heat setting up if you get a dry nose or throat, down if you get condensation in the hose  or mask.  Position the CPAP lower than you so that any condensation in the hose drains back into the machine rather than towards the mask.    Try a system that uses nasal pillows if traditional masks give you problems.    Clean your mask, tubing and headgear once a week. Make sure the equipment dries fully.    Regularly check and replace the filters for your CPAP unit and humidifier.    Work closely with your sleep doctor and your CPAP supplier to make sure that you have the machine, mask and air pressure setting that works best for you.    BESIDES CPAP, WHAT OTHER THERAPIES ARE THERE?    Postioning devices if you only have the problem on your back    Dental devices if your condition is mild    Nasal valves may be effective though experience is limited    Tongue Retaining Device if missing teeth precludes the use of a dental device    Weight loss if you are overweight    Surgery in limited cases where devices are not acceptable or there are problems with structures in the nose and throat  If treated with one of these alternative options, further evaluation is necessary to ensure that the therapy is effective. This may require some form of testing.     Healthy Lifestyle:  Healthy diet, exercise and Limit alcohol: Not only will excessive alcohol increase your weight over time, but it irritates the throat tissues and make them swell, shrinking the airway and causing snoring. Drinking alcohol should be limited and stopped within 3-4 hours before going to bed.   Stop smoking: (Red swollen throat, heat, nicotine), also irritates and swells the airway, among numerous other negative health consequences.    Positioning Device  This example shows a pillow that straps around the waist. It may be appropriate for those whose sleep study shows milder sleep apnea that occurs primarily when lying flat on one's back. Preliminary studies have shown benefit but effectiveness at home should be verified.    Nasal Valves               Nasal valves may not be effective if you have frequent nasal congestion or have difficulty breathing through your nose. They may be an option for mild apnea if other options are not well tolerated. The efficacy of these devices is generally less than CPAP or oral appliances.  Oral Appliance  These are examples of two of many custom-made devices that are more likely to work in mild sleep apnea  Oral appliances are dental mouth pieces that fit very much like a sports mouth guards or removable orthodontic retainers. They are used to treat snoring  And obstructive sleep apnea . The device prevents the airway from collapsing by either holding the tongue or supporting the jaw in a forward position. Since oral appliances are non-invasive and easy to use, they may be considered as an early treatment option. Oral appliance therapy (OAT) involves the customization, selection, fabrication, fitting, adjustments and long-term follow-up care of specially designed oral devices, worn during sleep, which reposition the lower jaw and tongue base forward to maintain an open airway.  Custom made oral appliances are proven to be more effective than over-the-counter devices. Therefore, the over-the-counter devices are recommended not to be used as a screening tool nor as a therapeutic option.  Who gets a dental device?  Oral appliance therapy can be used as an alternative to  CPAP therapy for the treatment of mild to moderate sleep apnea and for those patients who prefer OAT to CPAP. Oral appliance therapy is a first line therapy for the treatment of primary snoring. Additionally, OAT is an option for those that cannot tolerate CPAP as therapy or who have experienced insufficient surgical results.    Possible side effects?  Frequent but minor side effects include: excessive salivation, dry mouth, discomfort of teeth and jaw and temporary changes in the patient s bite.  Potential complications include: jaw pain, permanent occlusal  changes and TMJ symptoms.  The above mentioned side effects and complications can be recognized and managed by dentists trained in dental sleep medicine.    Finding a dentist that practices dental sleep medicine  Specific training is available through the American Academy of Dental Sleep Medicine for dentists interested in working in the field of sleep. To find a dentist who is educated in the field of sleep and the use of oral appliances, near you, visit the Web site of the American Academy of Dental Sleep Medicine; also see http://www.accpstorage.org/newOrganization/patients/oralAppliances.pdf   To search for a dentist certified in these practices:  Http://aadsm.org/FindADentist.aspx?1  Http://www.accpstorage.org/newOrganization/patients/oralAppliances.pdf    Tongue Retaining Device               Tongue Retaining Devices are devices that generally  suction cup  onto the tongue preventing it from falling back into the back of the throat during sleep.  They may be an option for people missing teeth, but can be uncomfortable. This particular device can be purchased online, but similar devices made by dentists fit more precisely and may be tolerated better. In general, they are rarely effective and often not very well tolerated.    Weight Loss:  Some patients may experience reduction or elimination of sleep apnea with weight loss.  Though there are significant health benefits from weight loss, long-term weight loss is very difficult to achieve- studies show success with dietary management in less that 10% of people.     If you are interested in dietary weight loss, you should review the options discussed at the National Institutes of Health patient information site:     Http:/www.health.nih.gov/topic/WeightLossDieting    Bariatric programs offer counseling in all methods of weight loss:    Http:/www.[facility].org/Specialties/WeightLossSurgeryandMedicalMgmt/htm    Surgery:  There are a number of surgeries that  "have been attempted to treat apnea. In general, surgical options are usually reserved for cases in which there is a physical abnormality contributing to obstruction or other treatment options are ineffective or not tolerated. Most surgical options are either unreliable or quite invasive. One of the more common procedures is:  Uvulopalatopharyngoplasty: In this procedure, the uvula (the finger-like tissue that hangs in the back of the throat), part of the soft palate (the tissue that the uvula is attached to), and sometimes the tonsils or adenoids are removed. The efficacy of this surgery is around 30-50% .  After surgery, complications may include:  Sleepiness and sleep apnea related to post-surgery medication   Swelling, infection and bleeding   A sore throat and/or difficulty swallowing   Drainage of secretions into the nose and a nasal quality to the voice. English language speech does not seem to be affected by this surgery.   Narrowing of the airway in the nose and throat (hence constricting breathing) snoring and even iatrogenically caused sleep apnea. By cutting the tissues, excess scar tissue can \"tighten\" the airway and make it even smaller than it was before UPPP.  Patients who have had the uvula removed will become unable to correctly speak Sudanese or any other language that has a uvular 'r' phoneme.    Surgeries to help resolve nasal congestion may help reduce the severity of apnea slightly. Nasal congestion does not cause apnea on its own, so these surgeries are usually not performed just for ROMERO.  They may be worth considering if the nasal congestion is significantly bothersome independent of apnea.        "

## 2020-11-22 DIAGNOSIS — Z79.4 TYPE 2 DIABETES MELLITUS WITH STAGE 3B CHRONIC KIDNEY DISEASE, WITH LONG-TERM CURRENT USE OF INSULIN (H): Primary | ICD-10-CM

## 2020-11-22 DIAGNOSIS — E11.22 TYPE 2 DIABETES MELLITUS WITH STAGE 3B CHRONIC KIDNEY DISEASE, WITH LONG-TERM CURRENT USE OF INSULIN (H): Primary | ICD-10-CM

## 2020-11-22 DIAGNOSIS — N18.32 TYPE 2 DIABETES MELLITUS WITH STAGE 3B CHRONIC KIDNEY DISEASE, WITH LONG-TERM CURRENT USE OF INSULIN (H): Primary | ICD-10-CM

## 2020-11-23 NOTE — TELEPHONE ENCOUNTER
"Requested Prescriptions   Pending Prescriptions Disp Refills     ULTICARE MINI 31G X 6 MM insulin pen needle [Pharmacy Med Name: UltiCare Mini Pen Needles 31G X 6 MM Miscellaneous]  10     Sig: USE 1 PEN NEEDLE DAILY       Diabetic Supplies Protocol Failed - 11/22/2020 12:09 PM        Failed - Medication is active on med list        Passed - Patient is 18 years of age or older        Passed - Recent (6 mo) or future (30 days) visit within the authorizing provider's specialty     Patient had office visit in the last 6 months or has a visit in the next 30 days with authorizing provider.  See \"Patient Info\" tab in inbasket, or \"Choose Columns\" in Meds & Orders section of the refill encounter.                 "

## 2020-11-24 RX ORDER — FLURBIPROFEN SODIUM 0.3 MG/ML
SOLUTION/ DROPS OPHTHALMIC
Qty: 100 EACH | Refills: 10 | Status: SHIPPED | OUTPATIENT
Start: 2020-11-24 | End: 2022-02-21

## 2020-11-25 DIAGNOSIS — I10 ESSENTIAL HYPERTENSION: ICD-10-CM

## 2020-11-25 DIAGNOSIS — N18.30 CKD (CHRONIC KIDNEY DISEASE) STAGE 3, GFR 30-59 ML/MIN (H): Primary | ICD-10-CM

## 2020-11-30 RX ORDER — CARVEDILOL 6.25 MG/1
TABLET ORAL
Qty: 56 TABLET | Refills: 4 | Status: SHIPPED | OUTPATIENT
Start: 2020-11-30 | End: 2021-05-13

## 2020-12-09 DIAGNOSIS — N18.30 CKD (CHRONIC KIDNEY DISEASE) STAGE 3, GFR 30-59 ML/MIN (H): ICD-10-CM

## 2020-12-09 LAB
ALBUMIN SERPL-MCNC: 3.8 G/DL (ref 3.4–5)
ANION GAP SERPL CALCULATED.3IONS-SCNC: 5 MMOL/L (ref 3–14)
BUN SERPL-MCNC: 23 MG/DL (ref 7–30)
CALCIUM SERPL-MCNC: 9 MG/DL (ref 8.5–10.1)
CHLORIDE SERPL-SCNC: 110 MMOL/L (ref 94–109)
CO2 SERPL-SCNC: 27 MMOL/L (ref 20–32)
CREAT SERPL-MCNC: 1.37 MG/DL (ref 0.52–1.04)
CREAT UR-MCNC: 236 MG/DL
GFR SERPL CREATININE-BSD FRML MDRD: 50 ML/MIN/{1.73_M2}
GLUCOSE SERPL-MCNC: 112 MG/DL (ref 70–99)
HGB BLD-MCNC: 13 G/DL (ref 11.7–15.7)
PHOSPHATE SERPL-MCNC: 3.4 MG/DL (ref 2.5–4.5)
POTASSIUM SERPL-SCNC: 4.5 MMOL/L (ref 3.4–5.3)
PROT UR-MCNC: 0.34 G/L
PROT/CREAT 24H UR: 0.14 G/G CR (ref 0–0.2)
PTH-INTACT SERPL-MCNC: 51 PG/ML (ref 18–80)
SODIUM SERPL-SCNC: 142 MMOL/L (ref 133–144)

## 2020-12-09 PROCEDURE — 85018 HEMOGLOBIN: CPT | Performed by: INTERNAL MEDICINE

## 2020-12-09 PROCEDURE — 36415 COLL VENOUS BLD VENIPUNCTURE: CPT | Performed by: INTERNAL MEDICINE

## 2020-12-09 PROCEDURE — 84156 ASSAY OF PROTEIN URINE: CPT | Performed by: INTERNAL MEDICINE

## 2020-12-09 PROCEDURE — 80069 RENAL FUNCTION PANEL: CPT | Performed by: INTERNAL MEDICINE

## 2020-12-09 PROCEDURE — 83970 ASSAY OF PARATHORMONE: CPT | Performed by: INTERNAL MEDICINE

## 2020-12-15 ENCOUNTER — VIRTUAL VISIT (OUTPATIENT)
Dept: NEPHROLOGY | Facility: CLINIC | Age: 35
End: 2020-12-15
Payer: MEDICARE

## 2020-12-15 DIAGNOSIS — I10 ESSENTIAL HYPERTENSION: ICD-10-CM

## 2020-12-15 DIAGNOSIS — K21.9 GASTROESOPHAGEAL REFLUX DISEASE WITHOUT ESOPHAGITIS: ICD-10-CM

## 2020-12-15 DIAGNOSIS — N25.81 SECONDARY RENAL HYPERPARATHYROIDISM (H): ICD-10-CM

## 2020-12-15 DIAGNOSIS — F31.9 BIPOLAR AFFECTIVE DISORDER, REMISSION STATUS UNSPECIFIED (H): ICD-10-CM

## 2020-12-15 DIAGNOSIS — E11.22 TYPE 2 DIABETES MELLITUS WITH STAGE 3A CHRONIC KIDNEY DISEASE, WITH LONG-TERM CURRENT USE OF INSULIN (H): ICD-10-CM

## 2020-12-15 DIAGNOSIS — N18.31 TYPE 2 DIABETES MELLITUS WITH STAGE 3A CHRONIC KIDNEY DISEASE, WITH LONG-TERM CURRENT USE OF INSULIN (H): ICD-10-CM

## 2020-12-15 DIAGNOSIS — Z79.4 TYPE 2 DIABETES MELLITUS WITH STAGE 3A CHRONIC KIDNEY DISEASE, WITH LONG-TERM CURRENT USE OF INSULIN (H): ICD-10-CM

## 2020-12-15 DIAGNOSIS — N18.31 STAGE 3A CHRONIC KIDNEY DISEASE (H): Primary | ICD-10-CM

## 2020-12-15 PROCEDURE — 99214 OFFICE O/P EST MOD 30 MIN: CPT | Mod: 95 | Performed by: INTERNAL MEDICINE

## 2020-12-15 NOTE — PROGRESS NOTES
12/15/20     CC: CKD Stage 3, HTN    HPI: Divina Delgadillo is a 34 year old female who presents for follow-up of CKD. To review, her hx is significant for diabetes (~ 20 years), bipolar disease - was on lithium therapy for around 6 years but since our initial visit, she has since been taken off of it. Her diabetes was previously very poorly controlled (up to 10% in Jan) improved to 7.2% in June, now up to 9% in Nov. Creatinine has been 1.31-2.35 in the past year; stable at 1.34 today. Her last UPCR was 0.21 g/g in July - she is taking 5 mg of lisinopril currently.   2019 visit: Divina presents for routine follow-up today.  Her creatinine has been 1.23-3.47 in the past year.  Her baseline seems to be someplace between 1.2 and 1.6 and she is stable at 1.41 at this time.  At her last visit she was very motivated to exercise and have been attending the gym regularly.  Unfortunately she has gotten away from the exercise to some degree but is still motivated to continue to make healthy lifestyle changes.  Her blood pressure is well controlled and she has no swelling concerns.  She is not using NSAIDs.    12/15/20: video visit. Creatinine is stable at this time. 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 Some back pain in the AM -she attributes this to her bed. BP has been good at recent visit. No lightheadedness unless blood sugar is low. No NSAIDs. Eating and drinking well. She had her teeth pulled at the end of May. She is getting new teeth in January so she is excited about that.        Allergies   Allergen Reactions     Acetaminophen Other (See Comments)     pt previously tried to overdose on it, PMD does not want pt taking per pt.      Latex Rash            amLODIPine (NORVASC) 5 MG tablet, TAKE 2 TABLETS BY MOUTH EVERY DAY       ARIPiprazole (ABILIFY) 30 MG tablet, Take 30 mg by mouth daily       atorvastatin (LIPITOR) 10 MG tablet, Take 1 tablet (10 mg) by mouth daily       BIOTIN  FORTE 5 MG TABS, TAKE ONE TABLET BY MOUTH DAILY FOR 2 MONTHS       blood glucose (ACCU-CHEK GUIDE) test strip, Use to test blood sugar 3 times daily (accu chek GUIDE).       blood glucose monitoring (ACCU-CHEK FASTCLIX) lancets, Use to test blood sugar 3 times daily       carvedilol (COREG) 6.25 MG tablet, TAKE ONE TABLET BY MOUTH TWICE DAILY       cloZAPine (CLOZARIL) 100 MG tablet, Take 100 mg by mouth 2 times daily        FLUoxetine (PROZAC) 20 MG capsule, Take 20 mg by mouth daily       insulin aspart (NOVOLOG PEN) 100 UNIT/ML pen, Inject 1-5 Units Subcutaneous 3 times daily (with meals) Per sliding scale       insulin glargine (BASAGLAR KWIKPEN) 100 UNIT/ML pen, Inject 25 Units Subcutaneous daily       lamoTRIgine (LAMICTAL) 100 MG tablet, Take 100 mg by mouth At Bedtime       lisinopril (ZESTRIL) 5 MG tablet, TAKE ONE TABLET BY MOUTH DAILY       loratadine (CLARITIN) 10 MG tablet, TAKE ONE TABLET BY MOUTH EVERY MORNING       montelukast (SINGULAIR) 10 MG tablet, Take 1 tablet (10 mg) by mouth At Bedtime       omeprazole (PRILOSEC) 20 MG DR capsule, TAKE ONE  CAPSULE BY MOUTH DAILY       OZEMPIC, 1 MG/DOSE, 2 MG/1.5ML pen, INJECT 1MG SUB-Q EVERY 7 DAYS (ON THURSDAYS)       ULTICARE MINI 31G X 6 MM insulin pen needle, USE 1 PEN NEEDLE DAILY       VENTOLIN  (90 Base) MCG/ACT inhaler, inhale 2 puffs every 6 hours as needed for shortness of breath       zolpidem (AMBIEN) 5 MG tablet, Take tablet by mouth 15 minutes prior to sleep, for Sleep Study    No current facility-administered medications on file prior to visit.       Past Medical History:   Diagnosis Date     Acute pain of left knee 3/22/2019     Acute-on-chronic kidney injury (H) 3/22/2019     ARF (acute renal failure) (H) 3/23/2019     Cervical high risk HPV (human papillomavirus) test positive 6/20/2017     Depressive disorder, not elsewhere classified      DVT (deep venous thrombosis) (H)      Dysmetabolic syndrome X      Esophageal reflux     GERD      Mild intermittent asthma      Other abnormal heart sounds     Heart murmur     Other specified types of schizophrenia, unspecified condition      Pancreatic cancer (H)     left adrenal gland, partial pancreas, and spleen removed; no chemo or radiation.      Type II or unspecified type diabetes mellitus without mention of complication, not stated as uncontrolled      Unspecified disorder of tympanic membrane        Past Surgical History:   Procedure Laterality Date     ADRENALECTOMY       IR LIVER BIOPSY PERCUTANEOUS  2019     PANCREATECTOMY PARTIAL       PERCUTANEOUS BIOPSY LIVER Right 2019    Procedure: Percutaneous Liver Biopsy;  Surgeon: Sean Guzman PA-C;  Location: [facility] OR     SPLENECTOMY       SURGICAL HISTORY OF -   10/1/2000    Tympanoplasty     SURGICAL HISTORY OF -   10/1/2000    Tonsillectomy       Social History     Tobacco Use     Smoking status: Current Every Day Smoker     Packs/day: 1.00     Years: 4.00     Pack years: 4.00     Types: Cigarettes     Last attempt to quit: 2019     Years since quittin.4     Smokeless tobacco: Never Used     Tobacco comment: 15 cigarettes a day    Substance Use Topics     Alcohol use: No     Drug use: No       Family History   Problem Relation Age of Onset     Respiratory Mother         ASTHMA     Allergies Mother      Depression Mother      Heart Disease Father         HEART VALVE REPLACEMENT     Hypertension Father      Diabetes Father      Depression Father      Respiratory Brother      Allergies Brother      Respiratory Sister      Allergies Sister      Depression Sister      Respiratory Sister      Allergies Sister      Depression Sister      Kidney Disease No family hx of        ROS: A 4 system review of systems was negative other than noted here or above.     Exam:  There were no vitals taken for this visit.  GENERAL: Healthy, alert and no distress  EYES: Eyes grossly normal to inspection.  No discharge or erythema, or obvious  scleral/conjunctival abnormalities.  RESP: No audible wheeze, cough, or visible cyanosis.  No visible retractions or increased work of breathing.    SKIN: Visible skin clear. No significant rash, abnormal pigmentation or lesions.  NEURO: Cranial nerves grossly intact.  Mentation and speech appropriate for age.  PSYCH: Mentation appears normal, affect normal/bright, judgement and insight intact, normal speech and appearance well-groomed.       Results:   No visits with results within 1 Day(s) from this visit.   Latest known visit with results is:   Orders Only on 12/09/2020   Component Date Value Ref Range Status     Sodium 12/09/2020 142  133 - 144 mmol/L Final     Potassium 12/09/2020 4.5  3.4 - 5.3 mmol/L Final     Chloride 12/09/2020 110* 94 - 109 mmol/L Final     Carbon Dioxide 12/09/2020 27  20 - 32 mmol/L Final     Anion Gap 12/09/2020 5  3 - 14 mmol/L Final     Glucose 12/09/2020 112* 70 - 99 mg/dL Final    Non Fasting     Urea Nitrogen 12/09/2020 23  7 - 30 mg/dL Final     Creatinine 12/09/2020 1.37* 0.52 - 1.04 mg/dL Final     GFR Estimate 12/09/2020 50* >60 mL/min/[1.73_m2] Final    Comment: Non  GFR Calc  Starting 12/18/2018, serum creatinine based estimated GFR (eGFR) will be   calculated using the Chronic Kidney Disease Epidemiology Collaboration   (CKD-EPI) equation.       GFR Estimate If Black 12/09/2020 58* >60 mL/min/[1.73_m2] Final    Comment:  GFR Calc  Starting 12/18/2018, serum creatinine based estimated GFR (eGFR) will be   calculated using the Chronic Kidney Disease Epidemiology Collaboration   (CKD-EPI) equation.       Calcium 12/09/2020 9.0  8.5 - 10.1 mg/dL Final     Phosphorus 12/09/2020 3.4  2.5 - 4.5 mg/dL Final     Albumin 12/09/2020 3.8  3.4 - 5.0 g/dL Final     Parathyroid Hormone Intact 12/09/2020 51  18 - 80 pg/mL Final     Hemoglobin 12/09/2020 13.0  11.7 - 15.7 g/dL Final     Protein Random Urine 12/09/2020 0.34  g/L Final     Protein Total Urine  "g/gr Creatinine 12/09/2020 0.14  0 - 0.2 g/g Cr Final     Creatinine Urine 12/09/2020 236  mg/dL Final          Assessment/Plan:   1. CKD Stage 3: risk factors for kidney disease include longstanding hypertension, diabetes, as well as longterm lithium use. She is now away from lithium which is great to see given she is already at risk for diabetic nephropathy and would like to minimize risk.  She is on a low-dose of lisinopril.  I have been hesitant to increase the dose too far given her hemodynamic variability seen in the past.  Plan is labs in 6 month and follow-up in one year.     2. DM: Her A1c at this time is 9% I August - she reports good glucose levels more recently.     3. Bipolar: now off of lithium - she feels supported with her physicians treating her mental illness. Recent ED visit.     4. GERD: ideally would avoid PPI therapy given increased risk of incident CKD and AIN noted with PPI therapy. Tolerating once daily dosing - I am not certain we will be able to get away from PPI completely given the severity of her sxs previously.     5. Hypertension: Blood pressure is at goal of less than 130/80.  No changes are made today.    There are no Patient Instructions on file for this visit.   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      The patient has been notified of following:     \"This video visit will be conducted via a call between you and your physician/provider. We have found that certain health care needs can be provided without the need for an in-person physical exam.  This service lets us provide the care you need with a video conversation.  If a prescription is necessary we can send it directly to your pharmacy.  If lab work is needed we can place an order for that and you can then stop by our lab to have the test done at a later time.    Video visits are billed at different rates depending on your insurance coverage.  " "Please reach out to your insurance provider with any questions.    If during the course of the call the physician/provider feels a video visit is not appropriate, you will not be charged for this service.\"    Patient has given verbal consent for Video visit? Yes  How would you like to obtain your AVS? Mail a copy  If you are dropped from the video visit, the video invite should be resent to: Text to cell phone: [phone]  Will anyone else be joining your video visit? Maybe a Staff member at [facility].    Dionne Fuller LPN      Video-Visit Details    Type of service:  Video Visit    Video Start Time: 355 PM  Video End Time: 4:02 PM    Originating Location (pt. Location): Home    Distant Location (provider location):  [facility]     Platform used for Video Visit: Abeba Dias MD        "

## 2020-12-16 ENCOUNTER — TELEPHONE (OUTPATIENT)
Dept: SLEEP MEDICINE | Facility: CLINIC | Age: 35
End: 2020-12-16

## 2020-12-16 NOTE — TELEPHONE ENCOUNTER
Reason for Call:  Other appointment    Detailed comments: patient call to schedule sleep study. Please follow up.     Phone Number Patient can be reached at: Home number on file [phone] (home)    Best Time: anytime     Can we leave a detailed message on this number? YES    Call taken on 12/16/2020 at 8:45 AM by Danisha Hammond

## 2020-12-18 ENCOUNTER — TELEPHONE (OUTPATIENT)
Dept: SLEEP MEDICINE | Facility: CLINIC | Age: 35
End: 2020-12-18

## 2020-12-18 NOTE — TELEPHONE ENCOUNTER
Do with group home phone number [phone] called requesting for an HST as patient does not have transportation for in lab study. If applicable please place orders.

## 2020-12-22 DIAGNOSIS — E78.5 HYPERLIPIDEMIA LDL GOAL <100: ICD-10-CM

## 2020-12-22 DIAGNOSIS — I10 ESSENTIAL HYPERTENSION: Primary | Chronic | ICD-10-CM

## 2020-12-22 NOTE — TELEPHONE ENCOUNTER
"Requested Prescriptions   Pending Prescriptions Disp Refills     amLODIPine (NORVASC) 10 MG tablet [Pharmacy Med Name: amLODIPine Besylate 10 MG Tablet] 28 tablet 10     Sig: TAKE ONE TABLET BY MOUTH DAILY       Calcium Channel Blockers Protocol  Failed - 12/22/2020 11:30 AM        Failed - Normal serum creatinine on file in past 12 months     Recent Labs   Lab Test 12/09/20  1319 09/01/17  1049 09/01/17  1049   CR 1.37*   < >  --    CREAT  --   --  1.6*    < > = values in this interval not displayed.       Ok to refill medication if creatinine is low          Passed - Blood pressure under 140/90 in past 12 months     BP Readings from Last 3 Encounters:   11/03/20 110/60   08/27/20 118/60   08/14/20 116/58                 Passed - Recent (12 mo) or future (30 days) visit within the authorizing provider's specialty     Patient has had an office visit with the authorizing provider or a provider within the authorizing providers department within the previous 12 mos or has a future within next 30 days. See \"Patient Info\" tab in inbasket, or \"Choose Columns\" in Meds & Orders section of the refill encounter.              Passed - Medication is active on med list        Passed - Patient is age 18 or older        Passed - No active pregnancy on record        Passed - No positive pregnancy test in past 12 months           atorvastatin (LIPITOR) 10 MG tablet [Pharmacy Med Name: Atorvastatin Calcium 10 MG Tablet] 28 tablet 10     Sig: TAKE ONE TABLET BY MOUTH DAILY       Statins Protocol Passed - 12/22/2020 11:30 AM        Passed - LDL on file in past 12 months     Recent Labs   Lab Test 01/02/20  0945   LDL 78             Passed - No abnormal creatine kinase in past 12 months     Recent Labs   Lab Test 03/22/19  1735                   Passed - Recent (12 mo) or future (30 days) visit within the authorizing provider's specialty     Patient has had an office visit with the authorizing provider or a provider within the " "authorizing providers department within the previous 12 mos or has a future within next 30 days. See \"Patient Info\" tab in inbasket, or \"Choose Columns\" in Meds & Orders section of the refill encounter.              Passed - Medication is active on med list        Passed - Patient is age 18 or older        Passed - No active pregnancy on record        Passed - No positive pregnancy test in past 12 months             "

## 2020-12-22 NOTE — NURSING NOTE
Message sent to Procedure Schedule to assist with scheduling Future appts. recommended by Dr. Dias:    Dispositions Check-out Note   0 Return in about 1 year (around 12/15/2021). Patient Instructions   1. Labs in 6 months   2. Follow-up in one year.       Dionne Fuller LPN

## 2020-12-30 ENCOUNTER — TELEPHONE (OUTPATIENT)
Dept: SLEEP MEDICINE | Facility: CLINIC | Age: 35
End: 2020-12-30

## 2020-12-30 RX ORDER — AMLODIPINE BESYLATE 10 MG/1
TABLET ORAL
Qty: 30 TABLET | Refills: 3 | Status: SHIPPED | OUTPATIENT
Start: 2020-12-30 | End: 2021-05-13

## 2020-12-30 RX ORDER — ATORVASTATIN CALCIUM 10 MG/1
TABLET, FILM COATED ORAL
Qty: 30 TABLET | Refills: 5 | Status: SHIPPED | OUTPATIENT
Start: 2020-12-30 | End: 2021-06-11

## 2020-12-30 NOTE — TELEPHONE ENCOUNTER
Do called with [facility] asking if pt can do watchpat vs HST. Pt not capable of coming to [facility] to  device. Please advise

## 2021-01-11 RX ORDER — INSULIN GLARGINE 100 [IU]/ML
INJECTION, SOLUTION SUBCUTANEOUS
Qty: 9 ML | Refills: 10 | Status: SHIPPED | OUTPATIENT
Start: 2021-01-11 | End: 2022-02-02

## 2021-01-11 NOTE — TELEPHONE ENCOUNTER
"Requested Prescriptions   Pending Prescriptions Disp Refills     insulin glargine (BASAGLAR KWIKPEN) 100 UNIT/ML pen [Pharmacy Med Name: Basaglar KwikPen 100 UNIT/ML Solution pen-injector] 9 mL 10     Sig: INJECT 25 UNITS SUB Q DAILY       Long Acting Insulin Protocol Failed - 1/11/2021 11:04 AM        Failed - HgbA1C in past 3 or 6 months     If HgbA1C is 8 or greater, it needs to be on file within the past 3 months.  If less than 8, must be on file within the past 6 months.     Recent Labs   Lab Test 08/14/20  1546   A1C 9.0*             Passed - Serum creatinine on file in past 12 months     Recent Labs   Lab Test 12/09/20  1319 09/01/17  1049 09/01/17  1049   CR 1.37*   < >  --    CREAT  --   --  1.6*    < > = values in this interval not displayed.       Ok to refill medication if creatinine is low          Passed - Medication is active on med list        Passed - Patient is age 18 or older        Passed - Recent (6 mo) or future (30 days) visit within the authorizing provider's specialty     Patient had office visit in the last 6 months or has a visit in the next 30 days with authorizing provider or within the authorizing provider's specialty.  See \"Patient Info\" tab in inbasket, or \"Choose Columns\" in Meds & Orders section of the refill encounter.                 "

## 2021-01-18 NOTE — TELEPHONE ENCOUNTER
Spoke with Do and she informed me that patient has been scheduled for an HST  at the [facility].     Kiki Acosta MA

## 2021-01-20 ENCOUNTER — TELEPHONE (OUTPATIENT)
Dept: FAMILY MEDICINE | Facility: CLINIC | Age: 36
End: 2021-01-20

## 2021-01-20 NOTE — TELEPHONE ENCOUNTER
Patient able to received Covid Vaccine, answered yes it is okay.     Signed, faxed to [phone] and sent to [facility]. Rebecca Lang on 1/20/2021 at 2:16 PM

## 2021-01-21 ENCOUNTER — OFFICE VISIT (OUTPATIENT)
Dept: SLEEP MEDICINE | Facility: CLINIC | Age: 36
End: 2021-01-21
Payer: MEDICARE

## 2021-01-21 DIAGNOSIS — G47.00 INSOMNIA, UNSPECIFIED TYPE: ICD-10-CM

## 2021-01-21 DIAGNOSIS — R53.83 MALAISE AND FATIGUE: ICD-10-CM

## 2021-01-21 DIAGNOSIS — L65.9 HAIR LOSS: ICD-10-CM

## 2021-01-21 DIAGNOSIS — R53.81 MALAISE AND FATIGUE: ICD-10-CM

## 2021-01-21 DIAGNOSIS — Z72.820 LACK OF ADEQUATE SLEEP: ICD-10-CM

## 2021-01-21 DIAGNOSIS — R06.89 DYSPNEA AND RESPIRATORY ABNORMALITY: ICD-10-CM

## 2021-01-21 DIAGNOSIS — E66.09 CLASS 1 OBESITY DUE TO EXCESS CALORIES WITH BODY MASS INDEX (BMI) OF 32.0 TO 32.9 IN ADULT, UNSPECIFIED WHETHER SERIOUS COMORBIDITY PRESENT: ICD-10-CM

## 2021-01-21 DIAGNOSIS — I10 ESSENTIAL HYPERTENSION: ICD-10-CM

## 2021-01-21 DIAGNOSIS — E66.811 CLASS 1 OBESITY DUE TO EXCESS CALORIES WITH BODY MASS INDEX (BMI) OF 32.0 TO 32.9 IN ADULT, UNSPECIFIED WHETHER SERIOUS COMORBIDITY PRESENT: ICD-10-CM

## 2021-01-21 DIAGNOSIS — R06.00 DYSPNEA AND RESPIRATORY ABNORMALITY: ICD-10-CM

## 2021-01-21 PROCEDURE — G0399 HOME SLEEP TEST/TYPE 3 PORTA: HCPCS | Performed by: INTERNAL MEDICINE

## 2021-01-21 NOTE — Clinical Note
[place], I think we should repeat or do a Polysomnogram unless you think her mental health issues contraindicate

## 2021-01-21 NOTE — TELEPHONE ENCOUNTER
Requested Prescriptions   Pending Prescriptions Disp Refills     BIOTIN FORTE 5 MG TABS [Pharmacy Med Name: Biotin Forte 5 MG Tablet] 30 tablet 10     Sig: TAKE ONE TABLET BY MOUTH DAILY       There is no refill protocol information for this order

## 2021-01-21 NOTE — CONFIDENTIAL NOTE
Routing refill request to provider for review/approval because:  Drug not on the [facility] refill protocol     Devi Purdy RN

## 2021-01-22 ENCOUNTER — DOCUMENTATION ONLY (OUTPATIENT)
Dept: OTHER | Facility: CLINIC | Age: 36
End: 2021-01-22

## 2021-01-22 ENCOUNTER — DOCUMENTATION ONLY (OUTPATIENT)
Dept: SLEEP MEDICINE | Facility: CLINIC | Age: 36
End: 2021-01-22
Payer: MEDICARE

## 2021-01-22 RX ORDER — BIOTIN/FOLIC ACID/VIT BCOMP,C 5MG-0.8MG
TABLET ORAL
Qty: 30 TABLET | Refills: 3 | Status: SHIPPED | OUTPATIENT
Start: 2021-01-22 | End: 2021-07-28

## 2021-01-22 NOTE — PROGRESS NOTES
This HSAT was performed using a Noxturnal T3 device which recorded snore, sound, movement activity, body position, nasal pressure, oronasal thermal airflow, pulse, oximetry and both chest and abdominal respiratory effort. HSAT data was restricted to the time patient states they were in bed.     HSAT was scored using 1B 4% hypopnea rule.     AHI: 8.3.Snoring was reported as moderate.  Time with SpO2 below 89% was 9.8 minutes.   Overall signal quality was good     Pt will follow up with sleep provider to determine appropriate therapy.    Ordering Provider,  Scar Diaz PA Charles O. BA, [facility], RST System Clinical Specialist 1/22/2021

## 2021-01-22 NOTE — PROGRESS NOTES
Pt returned HST device. It was downloaded and forwarded data to the clinical specialist for scoring.    Jagdeep MADRIGAL     HST POST-STUDY QUESTIONNAIRE    1. What time did you go to bed?  7:45     2. How long do you think it took to fall asleep?  10 minutes  3. What time did you wake up to start the day?  6:45 AM  4. Did you get up during the night at all?  once  5. If you woke up, do you remember approximately what time(s)? midnight  6. Did you have any difficulty with the equipment?  No  7. Did you us any type of treatment with this study?  None  8. Was the head of the bed elevated? No  9. Did you sleep in a recliner?  No  10. Did you stop using CPAP at least 3 days before this test?  NA  11. Any other information you'd like us to know? none

## 2021-01-23 PROBLEM — R45.89 SUICIDAL BEHAVIOR: Status: RESOLVED | Noted: 2020-05-26 | Resolved: 2021-01-23

## 2021-01-23 PROBLEM — D49.0 MUCINOUS NEOPLASM OF PANCREAS: Chronic | Status: ACTIVE | Noted: 2017-02-27

## 2021-01-23 PROBLEM — Z86.2 H/O LEUKOCYTOSIS: Chronic | Status: ACTIVE | Noted: 2018-11-27

## 2021-01-23 PROBLEM — I82.629: Status: RESOLVED | Noted: 2017-01-06 | Resolved: 2021-01-23

## 2021-01-23 PROBLEM — I95.1 ORTHOSTATIC HYPOTENSION: Status: ACTIVE | Noted: 2017-01-10

## 2021-01-23 PROBLEM — Z97.5 IUD (INTRAUTERINE DEVICE) IN PLACE: Status: ACTIVE | Noted: 2017-07-26

## 2021-01-23 PROBLEM — Z86.718 HISTORY OF DVT (DEEP VEIN THROMBOSIS): Chronic | Status: ACTIVE | Noted: 2021-01-23

## 2021-01-23 PROBLEM — F31.9 BIPOLAR DISORDER (H): Chronic | Status: ACTIVE | Noted: 2017-02-27

## 2021-01-23 PROBLEM — C25.9 MALIGNANT NEOPLASM OF PANCREAS, UNSPECIFIED LOCATION OF MALIGNANCY (H): Status: ACTIVE | Noted: 2017-02-27

## 2021-01-23 PROBLEM — N18.30 TYPE 2 DIABETES MELLITUS WITH STAGE 3 CHRONIC KIDNEY DISEASE, WITH LONG-TERM CURRENT USE OF INSULIN (H): Status: ACTIVE | Noted: 2017-02-27

## 2021-01-23 PROBLEM — I82.629: Status: ACTIVE | Noted: 2017-01-06

## 2021-01-23 PROBLEM — Z79.4 TYPE 2 DIABETES MELLITUS WITH STAGE 3 CHRONIC KIDNEY DISEASE, WITH LONG-TERM CURRENT USE OF INSULIN (H): Status: ACTIVE | Noted: 2017-02-27

## 2021-01-23 PROBLEM — Z79.01 LONG TERM (CURRENT) USE OF ANTICOAGULANTS: Chronic | Status: ACTIVE | Noted: 2017-01-09

## 2021-01-23 PROBLEM — E11.22 TYPE 2 DIABETES MELLITUS WITH STAGE 3 CHRONIC KIDNEY DISEASE, WITH LONG-TERM CURRENT USE OF INSULIN (H): Status: ACTIVE | Noted: 2017-02-27

## 2021-01-23 PROBLEM — R55 NEAR SYNCOPE: Status: RESOLVED | Noted: 2019-03-22 | Resolved: 2021-01-23

## 2021-01-23 PROBLEM — E11.9 TYPE 2 DIABETES MELLITUS (H): Chronic | Status: ACTIVE | Noted: 2021-01-23

## 2021-01-23 NOTE — PROCEDURES
"HOME SLEEP STUDY INTERPRETATION    Patient: Divina Delgadillo  MRN: [id number]  YOB: 1985  Study Date: 1/21/2021  Referring Provider: Jaleesa Velasquez  Ordering Provider: TREVOR Morillo     Indications for Home Study: Divina Delgadillo is a 35 year old female with symptoms suggestive of obstructive sleep apnea.    Estimated body mass index is 33.23 kg/m  as calculated from the following:    Height as of 11/17/20: 1.6 m (5' 3\").    Weight as of 11/17/20: 85.1 kg (187 lb 9.6 oz).  Total score - [place]: 3 (11/17/2020  9:00 AM)      Data: A full night home sleep study was performed recording the standard physiologic parameters including body position, movement, sound, nasal pressure, thermal oral airflow, chest and abdominal movements with respiratory inductance plethysmography, and oxygen saturation by pulse oximetry. Pulse rate was estimated by oximetry recording. This study was considered suboptimal. As specified by the AASM Manual for the Scoring of Sleep and Associated events, version 2.3, Rule VIII.D 1B, 4% oxygen desaturation scoring for hypopneas is used as a standard of care on all home sleep apnea testing.    Analysis Time:  599 minutes    Respiration:   Sleep Associated Hypoxemia: mild sustained hypoxemia was present with Sap2 frequently 89-90%. Baseline oxygen saturation was 100%.  Time with saturation less than or equal to 88% was 2.5 minutes. The lowest oxygen saturation was 81%.   Snoring: Snoring was present.  Respiratory events: The home study revealed a presence of 0 obstructive apneas and 17 mixed and central apneas. There were 66 hypopneas resulting in a combined apnea/hypopnea index [AHI] of 8.3 events per hour. Events were overscored, but sensitivity of study reduced by lack of nasal pressure readings.  AHI was 10.5 per hour supine, n/a per hour prone, 5.0 per hour on left side, and 8.5 per hour on right side.   Pattern: Excluding events noted above, respiratory rate and " pattern was Normal.    Position: Percent of time spent: supine - 48%, prone - 0%, on left - 34%, on right - 18%.    Heart Rate: By pulse oximetry normal rate was noted.     Assessment:   Sub-optimal study  Mild obstructive sleep apnea was suggestsed.  Nasal pressure trtansducer readings were 100% erroneous    Recommendations:  Consider repeat HST or attended Polysomnogram .      Diagnosis Code(s): Snoring R06.83    Aguila Mittal MD, January 23, 2021   Diplomate, American Board of Internal Medicine, Sleep Medicine

## 2021-02-05 ENCOUNTER — TELEPHONE (OUTPATIENT)
Dept: FAMILY MEDICINE | Facility: CLINIC | Age: 36
End: 2021-02-05

## 2021-02-09 RX ORDER — INSULIN ASPART 100 [IU]/ML
INJECTION, SOLUTION INTRAVENOUS; SUBCUTANEOUS
Qty: 9 ML | Refills: 10 | Status: SHIPPED | OUTPATIENT
Start: 2021-02-09 | End: 2022-04-20

## 2021-02-11 ENCOUNTER — TELEPHONE (OUTPATIENT)
Dept: FAMILY MEDICINE | Facility: CLINIC | Age: 36
End: 2021-02-11

## 2021-02-11 NOTE — LETTER
February 11, 2021        Divina Delgadillo  [address]   [place] [address]          Dear Divina Delgadillo,         At [facility] we care about your health and are committed to providing quality patient care, which includes staying current on preventative screenings.  You can increase your chances of finding and treating cancers through regular screenings.      Our records show that you are due for the following screening(s):    YOU ARE DUE FOR A PHYSICAL WITH PAP       Diabetes follow up with primary care provider    Fasting blood check prior to talk or see your primary care provider.  We will check (A1C) and Cholesterol     The day of your appointment remember to bring your notes from all the blood sugar readings in the past 2 weeks at least. (check blood sugar at least every other day for a 1 week  - 1st time is fasting and then 2 hours after any meal) .     Also remember to bring your meter (glucometer) so we can download the data into your chart for future references.      Because of the new outbreak Cov-19 (Coronavirus), we are operating with different types of visits.       Virtual visits - you can have your lab draw done and later have a virtual visit where you and your provider can see each other through Smart Phones or Computers with cameras and microphones  Phone visits - you can have your lab draw done and later have a phone conversation with your provider.  Face to Face visits - the same as usual, have your lab draw and see the provider after.    You can choose anyone of those three options for visits above, please call us to schedule the lab and follow up with primary care provider as soon as possible.        Sincerely,    [facility]

## 2021-02-11 NOTE — TELEPHONE ENCOUNTER
Patient Quality Outreach Summary      Summary:    Patient is due/failing the following:   A1C, Asthma follow-up visit and Cervical Cancer Screening - PAP Needed    Type of outreach:    Sent letter.    Questions for provider review:    None                                                                                                                    Olivia Murray CMA

## 2021-02-16 ENCOUNTER — HOSPITAL ENCOUNTER (EMERGENCY)
Facility: CLINIC | Age: 36
Discharge: HOME | End: 2021-02-17
Attending: EMERGENCY MEDICINE | Admitting: EMERGENCY MEDICINE
Payer: MEDICARE

## 2021-02-16 DIAGNOSIS — F32.89 OTHER DEPRESSION: ICD-10-CM

## 2021-02-16 DIAGNOSIS — R45.851 SUICIDAL IDEATION: ICD-10-CM

## 2021-02-16 LAB
AMPHETAMINES UR QL SCN: NEGATIVE
BARBITURATES UR QL: NEGATIVE
BENZODIAZ UR QL: NEGATIVE
CANNABINOIDS UR QL SCN: NEGATIVE
COCAINE UR QL: NEGATIVE
ETHANOL UR QL SCN: NEGATIVE
HCG UR QL: NEGATIVE
OPIATES UR QL SCN: NEGATIVE

## 2021-02-16 PROCEDURE — 80320 DRUG SCREEN QUANTALCOHOLS: CPT | Performed by: EMERGENCY MEDICINE

## 2021-02-16 PROCEDURE — 99283 EMERGENCY DEPT VISIT LOW MDM: CPT | Performed by: EMERGENCY MEDICINE

## 2021-02-16 PROCEDURE — 90791 PSYCH DIAGNOSTIC EVALUATION: CPT

## 2021-02-16 PROCEDURE — 80307 DRUG TEST PRSMV CHEM ANLYZR: CPT | Performed by: EMERGENCY MEDICINE

## 2021-02-16 PROCEDURE — 81025 URINE PREGNANCY TEST: CPT | Performed by: EMERGENCY MEDICINE

## 2021-02-16 PROCEDURE — 99285 EMERGENCY DEPT VISIT HI MDM: CPT | Mod: 25 | Performed by: EMERGENCY MEDICINE

## 2021-02-17 ENCOUNTER — PATIENT OUTREACH (OUTPATIENT)
Dept: NURSING | Facility: CLINIC | Age: 36
End: 2021-02-17
Payer: MEDICARE

## 2021-02-17 VITALS
SYSTOLIC BLOOD PRESSURE: 148 MMHG | HEART RATE: 92 BPM | TEMPERATURE: 97.5 F | DIASTOLIC BLOOD PRESSURE: 70 MMHG | OXYGEN SATURATION: 98 % | RESPIRATION RATE: 18 BRPM

## 2021-02-17 DIAGNOSIS — Z71.89 OTHER SPECIFIED COUNSELING: ICD-10-CM

## 2021-02-17 NOTE — ED TRIAGE NOTES
"Pt states the she and her boyfriend broke up. Pt states this triggered her \"voices and stuff\". PT states she did engage in cutting.  "

## 2021-02-17 NOTE — ED PROVIDER NOTES
[facility] EMERGENCY DEPARTMENT ([facility])    2/16/21        History     Chief Complaint   Patient presents with     Suicidal     Pt states she has been feeling suicidal since she broke up with her boyfriend     HPI  Divina Delgadillo is a 35 year old female with a medical history signficant for suicidal behavior, schizoaffective disorder, bipolar disorder, borderline personality disorder, PTSD, depressive disorder, DM type II, HTN, and DVT who presents to the ED for evaluation of suicidal ideation.  Patient states that she has passive suicidal ideations today as she just broke up with her boyfriend.  She has no significant plans.  She states that she resides within a group home and has had passive suicidal ideations in the past.  She feels like she is at this point over her concerns for suicidality and states that she is just sad over the loss of her relationship.  She otherwise denies all other significant complaints at this time.    I have reviewed the Medications, Allergies, Past Medical and Surgical History, and Social History in the [employer] system.  PAST MEDICAL HISTORY:   Past Medical History:   Diagnosis Date     Acute pain of left knee 03/22/2019     Acute-on-chronic kidney injury (H) 03/22/2019     ARF (acute renal failure) (H) 03/23/2019     Cervical high risk HPV (human papillomavirus) test positive 06/20/2017     Deep venous thrombosis of arm (H) 1/6/2017    ultrasound 1/6/2017-  venous thrombus in the antecubital fossa region and the left forearm as described     Depressive disorder, not elsewhere classified      DVT (deep venous thrombosis) (H)      Dysmetabolic syndrome X      Esophageal reflux     GERD     Mild intermittent asthma      Near syncope 03/22/2019     Other specified types of schizophrenia, unspecified condition      Pancreatic cancer (H)     left adrenal gland, partial pancreas, and spleen removed; no chemo or radiation.      Suicidal behavior 05/26/2020     Type II or  unspecified type diabetes mellitus without mention of complication, not stated as uncontrolled        PAST SURGICAL HISTORY:   Past Surgical History:   Procedure Laterality Date     ADRENALECTOMY Left      ENT SURGERY  10/01/2000    Tympanoplasty     IR LIVER BIOPSY PERCUTANEOUS  2019     PANCREATECTOMY PARTIAL  2015     PERCUTANEOUS BIOPSY LIVER Right 2019    Procedure: Percutaneous Liver Biopsy;  Surgeon: Sean Guzman PA-C;  Location: [facility]     SPLENECTOMY       TONSILLECTOMY  10/01/2000    Tonsillectomy       Past medical history, past surgical history, medications, and allergies were reviewed with the patient. Additional pertinent items: None    FAMILY HISTORY:   Family History   Problem Relation Age of Onset     Respiratory Mother         ASTHMA     Allergies Mother      Depression Mother      Heart Disease Father         HEART VALVE REPLACEMENT     Hypertension Father      Diabetes Father      Depression Father      Respiratory Brother      Allergies Brother      Respiratory Sister      Allergies Sister      Depression Sister      Respiratory Sister      Allergies Sister      Depression Sister      Kidney Disease No family hx of        SOCIAL HISTORY:   Social History     Tobacco Use     Smoking status: Current Every Day Smoker     Packs/day: 1.00     Years: 4.00     Pack years: 4.00     Types: Cigarettes     Last attempt to quit: 2019     Years since quittin.6     Smokeless tobacco: Never Used     Tobacco comment: 15 cigarettes a day    Substance Use Topics     Alcohol use: No     Social history was reviewed with the patient. Additional pertinent items: None      Discharge Medication List as of 2021 11:58 PM      CONTINUE these medications which have NOT CHANGED    Details   !! amLODIPine (NORVASC) 10 MG tablet TAKE ONE TABLET BY MOUTH DAILY, Disp-30 tablet, R-3, E-Prescribe      !! amLODIPine (NORVASC) 5 MG tablet TAKE 2 TABLETS BY MOUTH EVERY DAY, Disp-60  tablet,R-5, E-PrescribeThis prescription was filled on 5/6/2020. Any refills authorized will be placed on file.      ARIPiprazole (ABILIFY) 30 MG tablet Take 30 mg by mouth daily, Historical      atorvastatin (LIPITOR) 10 MG tablet TAKE ONE TABLET BY MOUTH DAILY, Disp-30 tablet, R-5, E-Prescribe      BIOTIN FORTE 5 MG TABS TAKE ONE TABLET BY MOUTH DAILY, Disp-30 tablet, R-3, E-Prescribe      carvedilol (COREG) 6.25 MG tablet TAKE ONE TABLET BY MOUTH TWICE DAILY, Disp-56 tablet, R-4, E-Prescribe      cloZAPine (CLOZARIL) 100 MG tablet Take 100 mg by mouth 2 times daily , Historical      FLUoxetine (PROZAC) 20 MG capsule Take 20 mg by mouth daily, Historical      insulin glargine (BASAGLAR KWIKPEN) 100 UNIT/ML pen INJECT 25 UNITS SUB Q DAILY, Disp-9 mL, R-10, E-Prescribe      lamoTRIgine (LAMICTAL) 100 MG tablet Take 100 mg by mouth At Bedtime, Historical      lisinopril (ZESTRIL) 5 MG tablet TAKE ONE TABLET BY MOUTH DAILY, Disp-30 tablet, R-3, E-Prescribe      loratadine (CLARITIN) 10 MG tablet TAKE ONE TABLET BY MOUTH EVERY MORNING, Disp-28 tablet, R-5, E-Prescribe      montelukast (SINGULAIR) 10 MG tablet Take 1 tablet (10 mg) by mouth At Bedtime, Disp-30 tablet,R-5, E-Prescribe      NOVOLOG FLEXPEN 100 UNIT/ML soln INJECT 1 TO 5 UNITS SUB Q THREE TIMES DAILY WITH MEALS PER SLIDING SCALE, Disp-9 mL, R-10, E-Prescribe      omeprazole (PRILOSEC) 20 MG DR capsule TAKE ONE  CAPSULE BY MOUTH DAILY, Disp-28 capsule, R-4, E-Prescribe      OZEMPIC, 1 MG/DOSE, 2 MG/1.5ML pen INJECT 1MG SUB-Q EVERY 7 DAYS (ON THURSDAYS), Disp-3 mL, R-3, E-Prescribe      VENTOLIN  (90 Base) MCG/ACT inhaler inhale 2 puffs every 6 hours as needed for shortness of breath, Disp-18 g,R-3, E-PrescribeThis prescription was filled on 4/6/2020. Any refills authorized will be placed on file.      zolpidem (AMBIEN) 5 MG tablet Take tablet by mouth 15 minutes prior to sleep, for Sleep Study, Disp-1 tablet, R-0, E-Prescribe      blood glucose  (ACCU-CHEK GUIDE) test strip Use to test blood sugar 3 times daily (accu chek GUIDE)., Disp-150 strip,R-1, E-Prescribe      blood glucose monitoring (ACCU-CHEK FASTCLIX) lancets Use to test blood sugar 3 times daily, Disp-102 each,R-3, E-Prescribe      ULTICARE MINI 31G X 6 MM insulin pen needle USE 1 PEN NEEDLE DAILYDisp-100 each, [id number]       !! - Potential duplicate medications found. Please discuss with provider.             Allergies   Allergen Reactions     Acetaminophen Other (See Comments)     pt previously tried to overdose on it, PMD does not want pt taking per pt.      Latex Rash        Review of Systems  A complete review of systems was performed with pertinent positives and negatives noted in the HPI, and all other systems negative.    Physical Exam   BP: (!) 153/65  Pulse: 90  Temp: 97.5  F (36.4  C)  Resp: 16  SpO2: 100 %      Physical Exam  Vitals signs and nursing note reviewed.   Constitutional:       General: She is not in acute distress.     Appearance: She is well-developed. She is not diaphoretic.      Comments: Comfortably resting, lying in bed, NAD, nondiaphoretic, lucid, fully conversant, no  respiratory distress, alert and oriented.     HENT:      Head: Normocephalic and atraumatic.   Eyes:      General: No scleral icterus.  Neck:      Musculoskeletal: Normal range of motion and neck supple.   Skin:     General: Skin is warm and dry.      Coloration: Skin is not pale.      Findings: No erythema or rash.      Comments: Minor abrasions to right forearm from self harm.  They are all superficial with no need for additional intervention   Neurological:      Mental Status: She is alert and oriented to person, place, and time.         ED Course        Procedures                       Results for orders placed or performed during the hospital encounter of 02/16/21   Drug abuse screen 6 urine (tox)     Status: None   Result Value Ref Range    Amphetamine Qual Urine Negative NEG^Negative     Barbiturates Qual Urine Negative NEG^Negative    Benzodiazepine Qual Urine Negative NEG^Negative    Cannabinoids Qual Urine Negative NEG^Negative    Cocaine Qual Urine Negative NEG^Negative    Ethanol Qual Urine Negative NEG^Negative    Opiates Qualitative Urine Negative NEG^Negative   HCG qualitative urine     Status: None   Result Value Ref Range    HCG Qual Urine Negative NEG^Negative            No results found for this or any previous visit (from the past 24 hour(s)).  Medications - No data to display          Assessments & Plan (with Medical Decision Making)   This is a 35-year-old female patient coming into the emergency room for evaluation of passive suicidal ideations.  She just broke up with her boyfriend and at this time has no active suicidal thoughts.  She just feels sad about the loss of the relationship.  Please see the [facility] note for details of the exam and history.  Patient did have some superficial abrasions to her right forearm where she had been cutting with a small piece of glass.  She denied any other significant complaints.  At this time she will be discharged back to her group home and she can follow-up with her mental health team.    Pt was discharged home/self-care.  PT was provided written discharge instructions. Additionally verbal instructions were given and discussed with patient.  PT was asked to return to the ED immediately for any new or concerning symptoms.  Pt was in agreement, endorsed understanding, and questions were answered.     I have reviewed the nursing notes.    I have reviewed the findings, diagnosis, plan and need for follow up with the patient.    Discharge Medication List as of 2/16/2021 11:58 PM          Final diagnoses:   Suicidal ideation   Other depression       2/16/2021   [facility] EMERGENCY DEPARTMENT     Hua Bynum MD  02/18/21 0116

## 2021-02-17 NOTE — PROGRESS NOTES
Group home number and address   #[phone]   Address; [address], [place]    - 11:35 PM spoke with  staff Demetrio.   -11:55 PM spoke with  staff Demetrio a second time to inform her pt will be discharged back home. Staff confirmed plan.

## 2021-02-17 NOTE — ED NOTES
Bed: ED16  Expected date: 2/16/21  Expected time: 7:28 PM  Means of arrival:   Comments:  [facility]  35 F  From   [place]/attempted to cut right wrist

## 2021-02-17 NOTE — PROGRESS NOTES
Clinic Care Coordination Contact  [facility]/Voicemail    CHW Outreach attempted x 1.  Left message on patient's voicemail with call back information and requested return call.  Plan: CHW will try to reach patient again today at 3pm (group home stated patient should be home by then) or in 1-2 business days.    Danna RUBIN Community Health Worker  [facility] Care Coordination  [facility]  Phone: [phone]

## 2021-02-17 NOTE — DISCHARGE INSTRUCTIONS
Please make an appointment to follow up with Your Primary Care Provider as soon as possible if you have any concerns.

## 2021-02-18 ENCOUNTER — VIRTUAL VISIT (OUTPATIENT)
Dept: FAMILY MEDICINE | Facility: CLINIC | Age: 36
End: 2021-02-18
Payer: MEDICARE

## 2021-02-18 DIAGNOSIS — F60.3 BORDERLINE PERSONALITY DISORDER (H): Chronic | ICD-10-CM

## 2021-02-18 DIAGNOSIS — F32.A DEPRESSIVE DISORDER: Primary | Chronic | ICD-10-CM

## 2021-02-18 PROCEDURE — 99442 PR PHYSICIAN TELEPHONE EVALUATION 11-20 MIN: CPT | Mod: 95 | Performed by: NURSE PRACTITIONER

## 2021-02-18 RX ORDER — FLUOXETINE 10 MG/1
10 CAPSULE ORAL DAILY
Qty: 30 CAPSULE | Refills: 2 | Status: SHIPPED | OUTPATIENT
Start: 2021-02-18 | End: 2022-01-12 | Stop reason: DRUGHIGH

## 2021-02-18 NOTE — PROGRESS NOTES
Divina is a 35 year old who is being evaluated via a billable telephone visit.      How would you like to obtain your AVS? Mail a copy  If the video visit is dropped, the invitation should be resent by: Text to cell phone: [phone]  Will anyone else be joining your video visit? No      Assessment & Plan     Depressive disorder  -she is feeling better today, denies suicidal plans and thoughts, she saw her therapist yesterday and states it was helpful, she returned to work, states she got flowers from her manager and it made her feel well supported, she knows she has good support and home and work, she states she loves her job and what she does. She still feels depressed and we talk about slightly upping her Prozac to 30 mg daily, she will follow up with her psychiatrist within next few weeks  - FLUoxetine (PROZAC) 20 MG capsule; Take 1 capsule (20 mg) by mouth daily  - FLUoxetine (PROZAC) 10 MG capsule; Take 1 capsule (10 mg) by mouth daily Plus 20 mg capsule, total 30 mg daily    Borderline personality disorder (H)    - FLUoxetine (PROZAC) 20 MG capsule; Take 1 capsule (20 mg) by mouth daily  - FLUoxetine (PROZAC) 10 MG capsule; Take 1 capsule (10 mg) by mouth daily Plus 20 mg capsule, total 30 mg daily      See Patient Instructions    Return in about 4 weeks (around 3/18/2021) for Routine Visit, depression.    VERONIQUE Spears [facility]    Rhiannon Murcia is a 35 year old who presents for the following health issues   HPI       ED/UC Followup:    Facility:  [facility]   Date of visit: 2/16   Reason for visit: Suicide ideation; other depression  Current Status: She states she is still feeling a little depressed but is not suicidal anymore. Has been feeling a little anxious.            Review of Systems   Constitutional, HEENT, cardiovascular, pulmonary, gi and gu systems are negative, except as otherwise noted.      Objective           Vitals:  No vitals were obtained today due  to virtual visit.    Physical Exam   GENERAL: Healthy, alert and no distress  PSYCH: affect normal/bright, judgement and insight intact, normal speech             Total time 11 minutes

## 2021-02-19 ASSESSMENT — ASTHMA QUESTIONNAIRES: ACT_TOTALSCORE: 25

## 2021-03-03 DIAGNOSIS — J45.21 MILD INTERMITTENT ASTHMA WITH ACUTE EXACERBATION: ICD-10-CM

## 2021-03-03 NOTE — TELEPHONE ENCOUNTER
"Requested Prescriptions   Pending Prescriptions Disp Refills     montelukast (SINGULAIR) 10 MG tablet [Pharmacy Med Name: Montelukast Sodium 10 MG Tablet] 28 tablet 10     Sig: TAKE ONE TABLET BY MOUTH AT BEDTIME       Leukotriene Inhibitors Protocol Passed - 3/3/2021  9:19 AM        Passed - Patient is age 12 or older     If patient is under 16, ok to refill using age based dosing.           Passed - Asthma control assessment score within normal limits in last 6 months     Please review ACT score.           Passed - Medication is active on med list        Passed - Recent (6 mo) or future (30 days) visit within the authorizing provider's specialty     Patient had office visit in the last 6 months or has a visit in the next 30 days with authorizing provider or within the authorizing provider's specialty.  See \"Patient Info\" tab in inbasket, or \"Choose Columns\" in Meds & Orders section of the refill encounter.                 "

## 2021-03-04 RX ORDER — MONTELUKAST SODIUM 10 MG/1
TABLET ORAL
Qty: 28 TABLET | Refills: 5 | Status: SHIPPED | OUTPATIENT
Start: 2021-03-04 | End: 2021-09-03

## 2021-03-04 NOTE — TELEPHONE ENCOUNTER
ACT Total Scores 1/22/2020 5/21/2020 2/18/2021   ACT TOTAL SCORE (Goal Greater than or Equal to 20) 20 22 25   In the past 12 months, how many times did you visit the emergency room for your asthma without being admitted to the hospital? 0 0 0   In the past 12 months, how many times were you hospitalized overnight because of your asthma? 0 0 0     Prescription approved per [facility] Refill Protocol.    Ramona CHEN RN, BSN

## 2021-03-05 ENCOUNTER — VIRTUAL VISIT (OUTPATIENT)
Dept: FAMILY MEDICINE | Facility: CLINIC | Age: 36
End: 2021-03-05
Payer: MEDICARE

## 2021-03-05 DIAGNOSIS — Z20.822 SUSPECTED COVID-19 VIRUS INFECTION: Primary | ICD-10-CM

## 2021-03-05 PROCEDURE — 99441 PR PHYSICIAN TELEPHONE EVALUATION 5-10 MIN: CPT | Mod: 95 | Performed by: NURSE PRACTITIONER

## 2021-03-05 NOTE — PATIENT INSTRUCTIONS
"Discharge Instructions for COVID-19 Patients  You have--or may have--COVID-19. Please follow the instructions listed below.   If you have a weakened immune system, discuss with your doctor any other actions you need to take.  How can I protect others?  If you have symptoms (fever, cough, body aches or trouble breathing):    Stay home and away from others (self-isolate) until:  ? Your other symptoms have resolved (gotten better). And   ? You've had no fever--and no medicine that reduces fever--for 1 full day (24 hours). And   ? At least 10 days have passed since your symptoms started. (You may need to wait 20 days. Follow the advice of your care team.)  If you don't show symptoms, but testing showed that you have COVID-19:    Stay home and away from others (self-isolate) until at least 10 days have passed since the date of your first positive COVID-19 test.  During this time    Stay in your own room, even for meals. Use your own bathroom if you can.    Stay away from others in your home. No hugging, kissing or shaking hands. No visitors.    Don't go to work, school or anywhere else.    Clean \"high touch\" surfaces often (doorknobs, counters, handles). Use household cleaning spray or wipes.    You'll find a full list of  on the EPA website: www.epa.gov/pesticide-registration/list-n-disinfectants-use-against-sars-cov-2.    Cover your mouth and nose with a mask or other face covering to avoid spreading germs.    Wash your hands and face often. Use soap and water.    Caregivers in these groups are at risk for severe illness due to COVID-19:  ? People 65 years and older  ? People who live in a nursing home or long-term care facility  ? People with chronic disease (lung, heart, cancer, diabetes, kidney, liver, immunologic)  ? People who have a weakened immune system, including those who:    Are in cancer treatment    Take medicine that weakens the immune system, such as corticosteroids    Had a bone marrow or organ " transplant    Have an immune deficiency    Have poorly controlled HIV or AIDS    Are obese (body mass index of 40 or higher)    Smoke regularly    Caregivers should wear gloves while washing dishes, handling laundry and cleaning bedrooms and bathrooms.    Use caution when washing and drying laundry: Don't shake dirty laundry and use the warmest water setting that you can.    For more tips on managing your health at home, go to www.cdc.gov/coronavirus/2019-ncov/downloads/10Things.pdf.  How can I take care of myself at home?  1. Get lots of rest. Drink extra fluids (unless a doctor has told you not to).  2. Take Tylenol (acetaminophen) for fever or pain. If you have liver or kidney problems, ask your family doctor if it's okay to take Tylenol.   Adults can take either:   ? 650 mg (two 325 mg pills) every 4 to 6 hours, or   ? 1,000 mg (two 500 mg pills) every 8 hours as needed.  ? Note: Don't take more than 3,000 mg in one day. Acetaminophen is found in many medicines (both prescribed and over-the-counter medicines). Read all labels to be sure you don't take too much.   For children, check the Tylenol bottle for the right dose. The dose is based on the child's age or weight.  3. If you have other health problems (like cancer, heart failure, an organ transplant or severe kidney disease): Call your specialty clinic if you don't feel better in the next 2 days.  4. Know when to call 911. Emergency warning signs include:  ? Trouble breathing or shortness of breath  ? Pain or pressure in the chest that doesn't go away  ? Feeling confused like you haven't felt before, or not being able to wake up  ? Bluish-colored lips or face  5. Your doctor may have prescribed a blood thinner medicine. Follow their instructions.  Where can I get more information?     [facility] - About COVID-19:   [email or web address]    CDC - What to Do If You're Sick:  www.cdc.gov/coronavirus/2019-ncov/about/steps-when-sick.html    CDC - Ending Home Isolation: www.cdc.gov/coronavirus/2019-ncov/hcp/disposition-in-home-patients.html    CDC - Caring for Someone: www.cdc.gov/coronavirus/2019-ncov/if-you-are-sick/care-for-someone.html    [facility] - Interim Guidance for Hospital Discharge to Home: www.health.[facility].[place]./diseases/coronavirus/hcp/hospdischarge.pdf    Below are the COVID-19 hotlines at the [place] Department of Health ([facility]). Interpreters are available.  ? For health questions: Call 021-675-9262 or 1-884.823.6937 (7 a.m. to 7 p.m.)  ? For questions about schools and childcare: Call 505-625-1151 or 1-468.659.8252 (7 a.m. to 7 p.m.)    For informational purposes only. Not to replace the advice of your health care provider. Clinically reviewed by Dr. Esteban Renee.   Copyright   2020 [facility]. All rights reserved. [employer] 533828 - REV 01/05/21.

## 2021-03-05 NOTE — PROGRESS NOTES
"Divina is a 35 year old who is being evaluated via a billable telephone visit.      What phone number would you like to be contacted at? [phone]  How would you like to obtain your AVS? Pt. Doesn't need one    Assessment & Plan     Suspected COVID-19 virus infection  Quarantine advised. COVID PCR ordered. Symptomatic care advised.  - Symptomatic COVID-19 Virus (Coronavirus) by PCR; Future             Tobacco Cessation:   reports that she has been smoking cigarettes. She has a 4.00 pack-year smoking history. She has never used smokeless tobacco.      Patient Instructions   Discharge Instructions for COVID-19 Patients  You have--or may have--COVID-19. Please follow the instructions listed below.   If you have a weakened immune system, discuss with your doctor any other actions you need to take.  How can I protect others?  If you have symptoms (fever, cough, body aches or trouble breathing):    Stay home and away from others (self-isolate) until:  ? Your other symptoms have resolved (gotten better). And   ? You've had no fever--and no medicine that reduces fever--for 1 full day (24 hours). And   ? At least 10 days have passed since your symptoms started. (You may need to wait 20 days. Follow the advice of your care team.)  If you don't show symptoms, but testing showed that you have COVID-19:    Stay home and away from others (self-isolate) until at least 10 days have passed since the date of your first positive COVID-19 test.  During this time    Stay in your own room, even for meals. Use your own bathroom if you can.    Stay away from others in your home. No hugging, kissing or shaking hands. No visitors.    Don't go to work, school or anywhere else.    Clean \"high touch\" surfaces often (doorknobs, counters, handles). Use household cleaning spray or wipes.    You'll find a full list of  on the EPA website: www.epa.gov/pesticide-registration/list-n-disinfectants-use-against-sars-cov-2.    Cover your mouth " and nose with a mask or other face covering to avoid spreading germs.    Wash your hands and face often. Use soap and water.    Caregivers in these groups are at risk for severe illness due to COVID-19:  ? People 65 years and older  ? People who live in a nursing home or long-term care facility  ? People with chronic disease (lung, heart, cancer, diabetes, kidney, liver, immunologic)  ? People who have a weakened immune system, including those who:    Are in cancer treatment    Take medicine that weakens the immune system, such as corticosteroids    Had a bone marrow or organ transplant    Have an immune deficiency    Have poorly controlled HIV or AIDS    Are obese (body mass index of 40 or higher)    Smoke regularly    Caregivers should wear gloves while washing dishes, handling laundry and cleaning bedrooms and bathrooms.    Use caution when washing and drying laundry: Don't shake dirty laundry and use the warmest water setting that you can.    For more tips on managing your health at home, go to www.cdc.gov/coronavirus/2019-ncov/downloads/10Things.pdf.  How can I take care of myself at home?  1. Get lots of rest. Drink extra fluids (unless a doctor has told you not to).  2. Take Tylenol (acetaminophen) for fever or pain. If you have liver or kidney problems, ask your family doctor if it's okay to take Tylenol.   Adults can take either:   ? 650 mg (two 325 mg pills) every 4 to 6 hours, or   ? 1,000 mg (two 500 mg pills) every 8 hours as needed.  ? Note: Don't take more than 3,000 mg in one day. Acetaminophen is found in many medicines (both prescribed and over-the-counter medicines). Read all labels to be sure you don't take too much.   For children, check the Tylenol bottle for the right dose. The dose is based on the child's age or weight.  3. If you have other health problems (like cancer, heart failure, an organ transplant or severe kidney disease): Call your specialty clinic if you don't feel better in the  next 2 days.  4. Know when to call 911. Emergency warning signs include:  ? Trouble breathing or shortness of breath  ? Pain or pressure in the chest that doesn't go away  ? Feeling confused like you haven't felt before, or not being able to wake up  ? Bluish-colored lips or face  5. Your doctor may have prescribed a blood thinner medicine. Follow their instructions.  Where can I get more information?    [facility] - About COVID-19:   [email or web address]    CDC - What to Do If You're Sick: www.cdc.gov/coronavirus/2019-ncov/about/steps-when-sick.html    CDC - Ending Home Isolation: www.cdc.gov/coronavirus/2019-ncov/hcp/disposition-in-home-patients.html    [facility] - Caring for Someone: www.cdc.gov/coronavirus/2019-ncov/if-you-are-sick/care-for-someone.html    [facility] - Interim Guidance for Hospital Discharge to Home: www.health.[facility].mn./diseases/coronavirus/hcp/hospdischarge.pdf    Below are the COVID-19 hotlines at the [place] Department of Health ([facility]). Interpreters are available.  ? For health questions: Call 919-353-2825 or 1-612.183.6365 (7 a.m. to 7 p.m.)  ? For questions about schools and childcare: Call 892-987-9276 or 1-828.110.7629 (7 a.m. to 7 p.m.)    For informational purposes only. Not to replace the advice of your health care provider. Clinically reviewed by Dr. Esteban Renee.   Copyright   2020 [facility]. All rights reserved. [employer] [id number] - REV 01/05/21.          Return in about 1 week (around 3/12/2021).    VERONIQUE Johnston CNP  [facility] Hospital[facility]ARETT Murcia is a 35 year old who presents for the following health issues     HPI         Concern for COVID-19  About how many days ago did these symptoms start? 2 days  Is this your first visit for this illness? Yes  In the 14 days before your symptoms started, have you had close contact with someone with COVID-19 (Coronavirus)? No  Do you have a fever or chills? No  Are you having  new or worsening difficulty breathing? No  Do you have new or worsening cough? No  Have you had any new or unexplained body aches? No    Have you experienced any of the following NEW symptoms?    Headache: YES    Sore throat: No    Loss of taste or smell: No    Chest pain: No    Diarrhea: YES    Rash: No  What treatments have you tried? ibuprofen  Who do you live with? Group home  Are you, or a household member, a healthcare worker or a ? No  Do you live in a nursing home, group home, or shelter? YES  Do you have a way to get food/medications if quarantined? Yes because pt. Lives in a group home        Above HPI reviewed. Additionally, no fever, anosmia, ageusia, new cough. Diarrhea was worse yesterday, seems improved today. Needs COVID test to return to work.        Review of Systems   Constitutional, HEENT, cardiovascular, pulmonary, gi and gu systems are negative, except as otherwise noted.      Objective           Vitals:  No vitals were obtained today due to virtual visit.    Physical Exam   healthy, alert and no distress  PSYCH: Alert and oriented times 3; coherent speech, normal   rate and volume, able to articulate logical thoughts, able   to abstract reason, no tangential thoughts, no hallucinations   or delusions  Her affect is normal  RESP: No cough, no audible wheezing, able to talk in full sentences  Remainder of exam unable to be completed due to telephone visits                Phone call duration: 5 minutes

## 2021-03-07 DIAGNOSIS — Z20.822 SUSPECTED COVID-19 VIRUS INFECTION: ICD-10-CM

## 2021-03-07 LAB
SARS-COV-2 RNA RESP QL NAA+PROBE: NORMAL
SPECIMEN SOURCE: NORMAL

## 2021-03-07 PROCEDURE — U0003 INFECTIOUS AGENT DETECTION BY NUCLEIC ACID (DNA OR RNA); SEVERE ACUTE RESPIRATORY SYNDROME CORONAVIRUS 2 (SARS-COV-2) (CORONAVIRUS DISEASE [COVID-19]), AMPLIFIED PROBE TECHNIQUE, MAKING USE OF HIGH THROUGHPUT TECHNOLOGIES AS DESCRIBED BY CMS-2020-01-R: HCPCS | Performed by: NURSE PRACTITIONER

## 2021-03-07 PROCEDURE — U0005 INFEC AGEN DETEC AMPLI PROBE: HCPCS | Performed by: NURSE PRACTITIONER

## 2021-03-08 ENCOUNTER — NURSE TRIAGE (OUTPATIENT)
Dept: NURSING | Facility: CLINIC | Age: 36
End: 2021-03-08

## 2021-03-08 LAB
LABORATORY COMMENT REPORT: NORMAL
SARS-COV-2 RNA RESP QL NAA+PROBE: NEGATIVE
SPECIMEN SOURCE: NORMAL

## 2021-03-08 NOTE — TELEPHONE ENCOUNTER
Coronavirus (COVID-19) Notification     Reason for call  Patient requesting results from 3/7/21 for work .      Lab Result    Lab test 2019-nCoV rRt-PCR in process        RN Recommendations/Instructions per [facility]  Continue quarantee and following instructions until you receive the results     Please Contact your PCP clinic or return to the Emergency department if your:    Symptoms worsen or other concerning symptom's.     Patient informed that if test for COVID19 is POSITIVE,  you will receive a call typically within 48 hours from the test date (date lab collected).  If NEGATIVE result, you will receive a letter in the mail or [email or web address].   FNA encourage Pt to use [facility] to look up test results and Pt declines further help from  Central schedulers, now .   Macey Hoyt RN

## 2021-03-17 DIAGNOSIS — K21.9 GASTROESOPHAGEAL REFLUX DISEASE WITHOUT ESOPHAGITIS: ICD-10-CM

## 2021-03-17 NOTE — TELEPHONE ENCOUNTER
"Requested Prescriptions   Pending Prescriptions Disp Refills     omeprazole (PRILOSEC) 20 MG DR capsule [Pharmacy Med Name: Omeprazole 20 MG Capsule delayed release] 28 capsule 10     Sig: TAKE ONE  CAPSULE BY MOUTH DAILY       PPI Protocol Passed - 3/17/2021  2:00 PM        Passed - Not on Clopidogrel (unless Pantoprazole ordered)        Passed - No diagnosis of osteoporosis on record        Passed - Recent (12 mo) or future (30 days) visit within the authorizing provider's specialty     Patient has had an office visit with the authorizing provider or a provider within the authorizing providers department within the previous 12 mos or has a future within next 30 days. See \"Patient Info\" tab in inbasket, or \"Choose Columns\" in Meds & Orders section of the refill encounter.              Passed - Medication is active on med list        Passed - Patient is age 18 or older        Passed - No active pregnacy on record        Passed - No positive pregnancy test in past 12 months             "

## 2021-03-24 ENCOUNTER — TELEPHONE (OUTPATIENT)
Dept: SLEEP MEDICINE | Facility: CLINIC | Age: 36
End: 2021-03-24

## 2021-03-24 DIAGNOSIS — R53.81 MALAISE AND FATIGUE: ICD-10-CM

## 2021-03-24 DIAGNOSIS — R06.89 DYSPNEA AND RESPIRATORY ABNORMALITY: ICD-10-CM

## 2021-03-24 DIAGNOSIS — I10 ESSENTIAL HYPERTENSION: ICD-10-CM

## 2021-03-24 DIAGNOSIS — Z72.820 LACK OF ADEQUATE SLEEP: ICD-10-CM

## 2021-03-24 DIAGNOSIS — R53.83 MALAISE AND FATIGUE: ICD-10-CM

## 2021-03-24 DIAGNOSIS — G47.00 INSOMNIA, UNSPECIFIED TYPE: ICD-10-CM

## 2021-03-24 DIAGNOSIS — R06.00 DYSPNEA AND RESPIRATORY ABNORMALITY: ICD-10-CM

## 2021-03-24 NOTE — TELEPHONE ENCOUNTER
Reason for call:  Other   Patient called regarding (reason for call): call back  Additional comments: patient is calling because she would like to have a in center sleep study instead of an at home sleep study. Patient would like to have that done at [place] if possible. Please call patient back.     Phone number to reach patient:  Home number on file [phone] (home)    Best Time:  Any time    Can we leave a detailed message on this number?  YES    Travel screening: Not Applicable

## 2021-03-26 ENCOUNTER — OFFICE VISIT (OUTPATIENT)
Dept: FAMILY MEDICINE | Facility: CLINIC | Age: 36
End: 2021-03-26
Payer: MEDICARE

## 2021-03-26 VITALS
HEART RATE: 86 BPM | OXYGEN SATURATION: 99 % | RESPIRATION RATE: 16 BRPM | TEMPERATURE: 98.5 F | SYSTOLIC BLOOD PRESSURE: 116 MMHG | WEIGHT: 190.7 LBS | HEIGHT: 63 IN | BODY MASS INDEX: 33.79 KG/M2 | DIASTOLIC BLOOD PRESSURE: 60 MMHG

## 2021-03-26 DIAGNOSIS — N89.8 VAGINAL DISCHARGE: ICD-10-CM

## 2021-03-26 DIAGNOSIS — Z00.00 ANNUAL PHYSICAL EXAM: Primary | ICD-10-CM

## 2021-03-26 DIAGNOSIS — H66.006 RECURRENT ACUTE SUPPURATIVE OTITIS MEDIA WITHOUT SPONTANEOUS RUPTURE OF TYMPANIC MEMBRANE OF BOTH SIDES: ICD-10-CM

## 2021-03-26 DIAGNOSIS — N76.0 BV (BACTERIAL VAGINOSIS): ICD-10-CM

## 2021-03-26 DIAGNOSIS — E11.42 DIABETIC POLYNEUROPATHY ASSOCIATED WITH TYPE 2 DIABETES MELLITUS (H): ICD-10-CM

## 2021-03-26 DIAGNOSIS — B96.89 BV (BACTERIAL VAGINOSIS): ICD-10-CM

## 2021-03-26 DIAGNOSIS — Z12.4 SCREENING FOR CERVICAL CANCER: ICD-10-CM

## 2021-03-26 LAB
ANION GAP SERPL CALCULATED.3IONS-SCNC: 4 MMOL/L (ref 3–14)
BUN SERPL-MCNC: 26 MG/DL (ref 7–30)
CALCIUM SERPL-MCNC: 8.9 MG/DL (ref 8.5–10.1)
CHLORIDE SERPL-SCNC: 109 MMOL/L (ref 94–109)
CHOLEST SERPL-MCNC: 147 MG/DL
CO2 SERPL-SCNC: 23 MMOL/L (ref 20–32)
CREAT SERPL-MCNC: 1.34 MG/DL (ref 0.52–1.04)
GFR SERPL CREATININE-BSD FRML MDRD: 51 ML/MIN/{1.73_M2}
GLUCOSE SERPL-MCNC: 148 MG/DL (ref 70–99)
HBA1C MFR BLD: 8 % (ref 0–5.6)
HDLC SERPL-MCNC: 57 MG/DL
LDLC SERPL CALC-MCNC: 72 MG/DL
NONHDLC SERPL-MCNC: 90 MG/DL
POTASSIUM SERPL-SCNC: 4.4 MMOL/L (ref 3.4–5.3)
SODIUM SERPL-SCNC: 136 MMOL/L (ref 133–144)
SPECIMEN SOURCE: ABNORMAL
TRIGL SERPL-MCNC: 90 MG/DL
WET PREP SPEC: ABNORMAL

## 2021-03-26 PROCEDURE — 80061 LIPID PANEL: CPT | Performed by: NURSE PRACTITIONER

## 2021-03-26 PROCEDURE — 87624 HPV HI-RISK TYP POOLED RSLT: CPT | Performed by: NURSE PRACTITIONER

## 2021-03-26 PROCEDURE — 99214 OFFICE O/P EST MOD 30 MIN: CPT | Mod: 25 | Performed by: NURSE PRACTITIONER

## 2021-03-26 PROCEDURE — 87210 SMEAR WET MOUNT SALINE/INK: CPT | Performed by: NURSE PRACTITIONER

## 2021-03-26 PROCEDURE — 80048 BASIC METABOLIC PNL TOTAL CA: CPT | Performed by: NURSE PRACTITIONER

## 2021-03-26 PROCEDURE — 99395 PREV VISIT EST AGE 18-39: CPT | Performed by: NURSE PRACTITIONER

## 2021-03-26 PROCEDURE — 36415 COLL VENOUS BLD VENIPUNCTURE: CPT | Performed by: NURSE PRACTITIONER

## 2021-03-26 PROCEDURE — G0145 SCR C/V CYTO,THINLAYER,RESCR: HCPCS | Performed by: NURSE PRACTITIONER

## 2021-03-26 PROCEDURE — 83036 HEMOGLOBIN GLYCOSYLATED A1C: CPT | Performed by: NURSE PRACTITIONER

## 2021-03-26 RX ORDER — TRAZODONE HYDROCHLORIDE 50 MG/1
50 TABLET, FILM COATED ORAL
COMMUNITY
Start: 2021-03-25 | End: 2022-10-27

## 2021-03-26 RX ORDER — METRONIDAZOLE 7.5 MG/G
1 GEL VAGINAL DAILY
Qty: 25 G | Refills: 0 | Status: SHIPPED | OUTPATIENT
Start: 2021-03-26 | End: 2021-03-31

## 2021-03-26 RX ORDER — AMOXICILLIN 500 MG/1
500 CAPSULE ORAL 3 TIMES DAILY
Qty: 21 CAPSULE | Refills: 0 | Status: SHIPPED | OUTPATIENT
Start: 2021-03-26 | End: 2021-04-02

## 2021-03-26 RX ORDER — GABAPENTIN 300 MG/1
300 CAPSULE ORAL AT BEDTIME
Qty: 30 CAPSULE | Refills: 3 | Status: SHIPPED | OUTPATIENT
Start: 2021-03-26 | End: 2021-07-22

## 2021-03-26 ASSESSMENT — ACTIVITIES OF DAILY LIVING (ADL): CURRENT_FUNCTION: NO ASSISTANCE NEEDED

## 2021-03-26 ASSESSMENT — MIFFLIN-ST. JEOR: SCORE: 1529.14

## 2021-03-26 NOTE — PATIENT INSTRUCTIONS
Start Gabapentin 300 mg at bedtime should help with pain in your feet    Start Amoxil 500 mg 3 times daily for 7 days for ear infection    Labs today

## 2021-03-26 NOTE — PROGRESS NOTES
"   SUBJECTIVE:   CC: Divina Delgadillo is an 36 year old woman who presents for preventive health visit.     Chief Complaint   Patient presents with     Physical     with pap      Diabetes     Blood Draw     Fasting for labs      Letter for School/Work     Needs a letter for work, she may be a little late today due to having this appt.        Patient has been advised of split billing requirements and indicates understanding: Yes  Healthy Habits:    In general, how would you rate your overall health?  Good    Frequency of exercise:  None    Duration of exercise:  Less than 15 minutes    Do you usually eat at least 4 servings of fruit and vegetables a day, include whole grains    & fiber and avoid regularly eating high fat or \"junk\" foods?  No    Taking medications regularly:  Yes    Barriers to taking medications:  None    Medication side effects:  None    Ability to successfully perform activities of daily living:  No assistance needed    Home Safety:  No safety concerns identified    Hearing Impairment:  No hearing concerns    In the past 6 months, have you been bothered by leaking of urine?  No    In general, how would you rate your overall mental or emotional health?  Very good      PHQ-2 Total Score:    Additional concerns today:  Yes (Diabetes Check and would like toget her heart checked out due to family history. )        Diabetes Follow-up    How often are you checking your blood sugar? Three times daily  Blood sugar testing frequency justification:  Uncontrolled diabetes  What time of day are you checking your blood sugars (select all that apply)?  Before meals  Have you had any blood sugars above 200?  No  Have you had any blood sugars below 70?  Yes- 68     What symptoms do you notice when your blood sugar is low?  Shaky and Dizzy    What concerns do you have today about your diabetes? None     Do you have any of these symptoms? (Select all that apply)  Burning in feet and some numbness in her great toes "     Have you had a diabetic eye exam in the last 12 months? No    Hyperlipidemia Follow-Up      Are you regularly taking any medication or supplement to lower your cholesterol?   Yes- Lipitor     Are you having muscle aches or other side effects that you think could be caused by your cholesterol lowering medication?  No    Hypertension Follow-up      Do you check your blood pressure regularly outside of the clinic? Yes at home     Are you following a low salt diet? No    Are your blood pressures ever more than 140 on the top number (systolic) OR more   than 90 on the bottom number (diastolic), for example 140/90? No    BP Readings from Last 2 Encounters:   21 116/60   21 (!) 148/70     Hemoglobin A1C (%)   Date Value   2021 8.0 (H)   2020 9.0 (H)     LDL Cholesterol Calculated (mg/dL)   Date Value   2021 72   2020 78         Today's PHQ-2 Score:   PHQ-2 (  [employer]) 2019   Q1: Little interest or pleasure in doing things 0   Q2: Feeling down, depressed or hopeless 0   PHQ-2 Score 0       Abuse: Current or Past (Physical, Sexual or Emotional) - No  Do you feel safe in your environment? Yes        Social History     Tobacco Use     Smoking status: Current Every Day Smoker     Packs/day: 1.00     Years: 4.00     Pack years: 4.00     Types: Cigarettes     Last attempt to quit: 2019     Years since quittin.7     Smokeless tobacco: Never Used     Tobacco comment: 15 cigarettes a day    Substance Use Topics     Alcohol use: No     If you drink alcohol do you typically have >3 drinks per day or >7 drinks per week? Not applicable        Reviewed orders with patient.  Reviewed health maintenance and updated orders accordingly - Yes  Labs reviewed in EPIC  BP Readings from Last 3 Encounters:   21 116/60   21 (!) 148/70   20 110/60    Wt Readings from Last 3 Encounters:   21 86.5 kg (190 lb 11.2 oz)   20 85.1 kg (187 lb 9.6 oz)   20 85.1 kg  "(187 lb 9.6 oz)                    Breast Cancer Screening:  Any new diagnosis of family breast, ovarian, or bowel cancer? No    FSH-7: No flowsheet data found.  click delete button to remove this line now  Patient under 40 years of age: Routine Mammogram Screening not recommended.   Pertinent mammograms are reviewed under the imaging tab.    History of abnormal Pap smear: NO - age 30- 65 PAP every 3 years recommended  PAP / HPV Latest Ref Rng & Units 6/14/2018 6/15/2017 3/4/2009   PAP - NIL NIL NIL   HPV 16 DNA NEG:Negative Negative Negative -   HPV 18 DNA NEG:Negative Negative Negative -   OTHER HR HPV NEG:Negative Negative Positive(A) -     Reviewed and updated as needed this visit by clinical staff  Tobacco  Allergies  Meds   Med Hx  Surg Hx  Fam Hx  Soc Hx        Reviewed and updated as needed this visit by Provider                    Review of Systems  CONSTITUTIONAL: NEGATIVE for fever, chills, change in weight  INTEGUMENTARU/SKIN: NEGATIVE for worrisome rashes, moles or lesions  EYES: NEGATIVE for vision changes or irritation  ENT: POSITIVE for ear pain bilateral  RESP: NEGATIVE for significant cough or SOB  BREAST: NEGATIVE for masses, tenderness or discharge  CV: NEGATIVE for chest pain, palpitations or peripheral edema  GI: NEGATIVE for nausea, abdominal pain, heartburn, or change in bowel habits   female: vaginal discharge  MUSCULOSKELETAL: NEGATIVE for significant arthralgias or myalgia  NEURO: NEGATIVE for weakness, dizziness or paresthesias  PSYCHIATRIC: NEGATIVE for changes in mood or affect     OBJECTIVE:   /60 (BP Location: Right arm, Patient Position: Sitting, Cuff Size: Adult Regular)   Pulse 86   Temp 98.5  F (36.9  C) (Tympanic)   Resp 16   Ht 1.6 m (5' 3\")   Wt 86.5 kg (190 lb 11.2 oz)   SpO2 99%   BMI 33.78 kg/m    Physical Exam  GENERAL: healthy, alert and no distress  EYES: Eyes grossly normal to inspection, PERRL and conjunctivae and sclerae normal  HENT: normal " cephalic/atraumatic and both ears: mucopurulent effusion  NECK: no adenopathy, no asymmetry, masses, or scars and thyroid normal to palpation  RESP: lungs clear to auscultation - no rales, rhonchi or wheezes  CV: regular rate and rhythm, normal S1 S2, no S3 or S4, no murmur, click or rub, no peripheral edema and peripheral pulses strong   (female): normal female external genitalia, normal urethral meatus, vaginal mucosa, normal cervix/adnexa/uterus without masses or discharge  MS: no gross musculoskeletal defects noted, no edema  SKIN: no suspicious lesions or rashes  NEURO: Normal strength and tone, mentation intact and speech normal  PSYCH: mentation appears normal, affect normal/bright    Diagnostic Test Results:  Labs reviewed in Epic    ASSESSMENT/PLAN:   1. Annual physical exam    - Lipid panel reflex to direct LDL Fasting  - Basic metabolic panel  (Ca, Cl, CO2, Creat, Gluc, K, Na, BUN)    2. Uncontrolled type 2 diabetes mellitus with diabetic polyneuropathy, with long-term current use of insulin (H)  -improved, recommend to continue current treatment plan  -exercise, try to loose weight and follow diabetic diet   Lab Results   Component Value Date    A1C 8.0 03/26/2021    A1C 9.0 08/14/2020    A1C 7.8 05/18/2020    A1C 7.4 02/17/2020    A1C 8.0 01/14/2020       - Hemoglobin A1c  - Lipid panel reflex to direct LDL Fasting  - Basic metabolic panel  (Ca, Cl, CO2, Creat, Gluc, K, Na, BUN)    3. Screening for cervical cancer    - Pap imaged thin layer screen with HPV - recommended age 30 - 65 years (select HPV order below)  - HPV High Risk Types DNA Cervical    4. Recurrent acute suppurative otitis media without spontaneous rupture of tympanic membrane of both sides    - amoxicillin (AMOXIL) 500 MG capsule; Take 1 capsule (500 mg) by mouth 3 times daily for 7 days  Dispense: 21 capsule; Refill: 0    5. Diabetic polyneuropathy associated with type 2 diabetes mellitus (H)    - gabapentin (NEURONTIN) 300 MG  "capsule; Take 1 capsule (300 mg) by mouth At Bedtime  Dispense: 30 capsule; Refill: 3    6. Vaginal discharge  -positive for BV  - Wet prep    7. BV (bacterial vaginosis)    - metroNIDAZOLE (METROGEL) 0.75 % vaginal gel; Place 1 applicator (5 g) vaginally daily for 5 days  Dispense: 25 g; Refill: 0    Patient has been advised of split billing requirements and indicates understanding: Yes  COUNSELING:  Reviewed preventive health counseling, as reflected in patient instructions       Regular exercise       Healthy diet/nutrition    Estimated body mass index is 33.78 kg/m  as calculated from the following:    Height as of this encounter: 1.6 m (5' 3\").    Weight as of this encounter: 86.5 kg (190 lb 11.2 oz).    Weight management plan: Discussed healthy diet and exercise guidelines    She reports that she has been smoking cigarettes. She has a 4.00 pack-year smoking history. She has never used smokeless tobacco.  Tobacco Cessation Action Plan:   Information offered: Patient not interested at this time      Counseling Resources:  ATP IV Guidelines  Pooled Cohorts Equation Calculator  Breast Cancer Risk Calculator  BRCA-Related Cancer Risk Assessment: FHS-7 Tool  FRAX Risk Assessment  ICSI Preventive Guidelines  Dietary Guidelines for Americans, 2010  USDA's MyPlate  ASA Prophylaxis  Lung CA Screening    VERONIQUE Spears [facility]  "

## 2021-03-26 NOTE — LETTER
[facility]  [address] [place] [address]  Phone: [phone]    March 26, 2021        Divina HOANG Leona  [address] [place] [address]   [facility] [address]          To whom it may concern:    RE: Divina K Leona    Patient was seen and treated today at our clinic and missed work.  Patient may return to work 3/26 th with the following:  No restrictions    Please contact me for questions or concerns.      Sincerely,        VERONIQUE Spears CNP

## 2021-03-27 ENCOUNTER — HEALTH MAINTENANCE LETTER (OUTPATIENT)
Age: 36
End: 2021-03-27

## 2021-03-30 ENCOUNTER — TELEPHONE (OUTPATIENT)
Dept: FAMILY MEDICINE | Facility: CLINIC | Age: 36
End: 2021-03-30

## 2021-03-30 ENCOUNTER — VIRTUAL VISIT (OUTPATIENT)
Dept: FAMILY MEDICINE | Facility: CLINIC | Age: 36
End: 2021-03-30
Payer: MEDICARE

## 2021-03-30 DIAGNOSIS — Z20.822 ENCOUNTER FOR LABORATORY TESTING FOR COVID-19 VIRUS: Primary | ICD-10-CM

## 2021-03-30 DIAGNOSIS — Z20.822 EXPOSURE TO COVID-19 VIRUS: ICD-10-CM

## 2021-03-30 LAB
COPATH REPORT: NORMAL
PAP: NORMAL

## 2021-03-30 PROCEDURE — 99441 PR PHYSICIAN TELEPHONE EVALUATION 5-10 MIN: CPT | Mod: 95 | Performed by: NURSE PRACTITIONER

## 2021-03-30 NOTE — PATIENT INSTRUCTIONS
COVID testing ordered today. You will receive a call from a blocked number to schedule that appointment.     Please quarantine until you receive your results.     If the results are negative, you need to be fever free for 24 hours before ending quarantine.     If your results are negative, but you have had exposure with a COVID positive person, you need to quarantine for 7 days from the date of your last exposure to that person or 7 days from their last day of quarantine if unable to avoid exposure.     If results are positive, you need to quarantine for 10 days from start of symptoms AND you need to be fever free (without the use of fever reducing medications) for 24 hours before ending quarantine.     If results are positive, it is recommended that asymptomatic household contacts quarantine for 14 days, but CDC has recently approved only 10 days quarantine without a COVID test (while they still recommend the full 14 days).     If you develop worsening symptoms or difficulty breathing, please go to .      [facility]: About COVID-19: [email or web address]    CDC: What to Do If You're Sick: www.cdc.gov/coronavirus/2019-ncov/about/steps-when-sick.html    CDC: Ending Home Isolation: www.cdc.gov/coronavirus/2019-ncov/hcp/disposition-in-home-patients.html     CDC: Caring for Someone: www.cdc.gov/coronavirus/2019-ncov/if-you-are-sick/care-for-someone.html     [facility]: Interim Guidance for Hospital Discharge to Home: www.[facility]./diseases/coronavirus/hcp/hospdischarge.pdf    [facility] Close contacts and tracing: www.[facility].mn./diseases/coronavirus/close.html    [facility] clinical trials (COVID-19 research studies): clinicalaffairs.[facility]/umn-clinical-trials     Below are the COVID-19 hotlines at the [place] Department of Health ([facility]). Interpreters are available.   o For health questions: Call 368-447-8461 or 1-661.517.1157 (7 a.m. to 7 p.m.)  o For questions about  schools and childcare: Call 734-586-3633 or 1-499.732.2899 (7 a.m. to 7 p.m.)

## 2021-03-30 NOTE — TELEPHONE ENCOUNTER
Reason for Call:  Other call back    Detailed comments: Galilea from the patient's group home called and says the patient was exposed to COVID and work is saying for her to wait 5 days to see if she develop any symptoms she has has her 1st vaccine for COVID.  Group home asking for advice you can call them at [phone].    Phone Number Patient can be reached at: Cell number on file:    Telephone Information:   Mobile [phone]       Best Time:     Can we leave a detailed message on this number? YES    Call taken on 3/30/2021 at 9:19 AM by Elham Kaur

## 2021-03-30 NOTE — TELEPHONE ENCOUNTER
Galilea,  calling back, patient was staying with her sister all weekend, sister tested positive for COVID-19 yesterday. Advised virtual visit due to group home living, scheduled for today at 1:20pm with Stephanie Muniz.

## 2021-03-30 NOTE — PROGRESS NOTES
Divina is a 36 year old who is being evaluated via a billable telephone visit.      What phone number would you like to be contacted at? [phone]  How would you like to obtain your AVS? MyChart    Assessment & Plan     Encounter for laboratory testing for COVID-19 virus  Exposure consistent with [facility] testing guidelines. Discussed quarantining recommendations. Discussed process for scheduling COVID testing. Recommended ER visit if symptoms worsen and/or can't be managed at home.     Patient is going to schedule test for Friday so it will be 5 days after last exposure so she can return to work if it is negative.    - Asymptomatic COVID-19 Virus (Coronavirus) by PCR; Future    Exposure to COVID-19 virus  See above.    - Asymptomatic COVID-19 Virus (Coronavirus) by PCR; Future       Patient Instructions   COVID testing ordered today. You will receive a call from a blocked number to schedule that appointment.     Please quarantine until you receive your results.     If the results are negative, you need to be fever free for 24 hours before ending quarantine.     If your results are negative, but you have had exposure with a COVID positive person, you need to quarantine for 7 days from the date of your last exposure to that person or 7 days from their last day of quarantine if unable to avoid exposure.     If results are positive, you need to quarantine for 10 days from start of symptoms AND you need to be fever free (without the use of fever reducing medications) for 24 hours before ending quarantine.     If results are positive, it is recommended that asymptomatic household contacts quarantine for 14 days, but CDC has recently approved only 10 days quarantine without a COVID test (while they still recommend the full 14 days).     If you develop worsening symptoms or difficulty breathing, please go to ER.      Resources    [facility]: About COVID-19: [email or web address]    CDC: What to Do If You're  Sick: www.cdc.gov/coronavirus/2019-ncov/about/steps-when-sick.html    CDC: Ending Home Isolation: www.cdc.gov/coronavirus/2019-ncov/hcp/disposition-in-home-patients.html     CDC: Caring for Someone: www.cdc.gov/coronavirus/2019-ncov/if-you-are-sick/care-for-someone.html     [facility]: Interim Guidance for Hospital Discharge to Home: www.[facility]./diseases/coronavirus/hcp/hospdischarge.pdf    [facility] Close contacts and tracing: www.[facility]./diseases/coronavirus/close.html    [facility] clinical trials (COVID-19 research studies): clinicalaffairs.[facility]-clinical-trials     Below are the COVID-19 hotlines at the [place] [facility] ([facility]). Interpreters are available.   o For health questions: Call 772-504-4120 or 1-133.887.3507 (7 a.m. to 7 p.m.)  o For questions about schools and childcare: Call 177-462-1764 or 1-163.996.9390 (7 a.m. to 7 p.m.)         No follow-ups on file.    VERONIQUE aBrroso CNP  [facility]    Rhiannon Murcia is a 36 year old who presents for the following health issues     HPI     Chief Complaint   Patient presents with     Covid 19 Testing     Pt reports she works at [employer] and can not return back to work until after covid test. Pt reports anytime before 3pm on Friday 04/02/21 would work best for a COVID test, needs to wait 5 days since seeing sister. Sister tested positive for covid after being in close contact with her this weekend. Pt is currently not having any symptoms.       Concern for COVID-19  About how many days ago did these symptoms start? No symptoms currently   Is this your first visit for this illness? Yes  In the 14 days before your symptoms started, have you had close contact with someone with COVID-19 (Coronavirus)? Yes, I have been in contact with someone who has COVID-19/Coronavirus (confirmed by lab test).  Who do you live with? Lives in a group home, quarantined in room   Do you live in a  nursing home, group home, or shelter? YES  Do you have a way to get food/medications if quarantined? Yes, I have a friend or family member who can help me.    Additional provider notes: Last exposed to sister Sunday 03/28/21 (sister tested positive for COVID yesterday). Work is recommending that she wait 5 days until she be tested (Friday April 2). Patient denies any symptoms currently. States she had 1st COVID vaccine a couple weeks ago and needs to have 2nd dose yet.     Review of Systems   All other systems reviewed and are negative.           Objective         Vitals:  No vitals were obtained today due to virtual visit.    healthy, alert, no distress and cooperative  PSYCH: Alert and oriented times 3; coherent speech, normal   rate and volume, able to articulate logical thoughts, able   to abstract reason, no tangential thoughts, no hallucinations   or delusions  Her affect is normal and pleasant  RESP: No cough, no audible wheezing, able to talk in full sentences  Remainder of exam unable to be completed due to telephone visits          Phone call duration: 5 minutes

## 2021-04-02 DIAGNOSIS — Z20.822 EXPOSURE TO COVID-19 VIRUS: ICD-10-CM

## 2021-04-02 DIAGNOSIS — Z20.822 ENCOUNTER FOR LABORATORY TESTING FOR COVID-19 VIRUS: ICD-10-CM

## 2021-04-02 PROBLEM — R87.810 CERVICAL HIGH RISK HPV (HUMAN PAPILLOMAVIRUS) TEST POSITIVE: Status: ACTIVE | Noted: 2017-06-20

## 2021-04-02 PROCEDURE — U0005 INFEC AGEN DETEC AMPLI PROBE: HCPCS | Performed by: NURSE PRACTITIONER

## 2021-04-02 PROCEDURE — U0003 INFECTIOUS AGENT DETECTION BY NUCLEIC ACID (DNA OR RNA); SEVERE ACUTE RESPIRATORY SYNDROME CORONAVIRUS 2 (SARS-COV-2) (CORONAVIRUS DISEASE [COVID-19]), AMPLIFIED PROBE TECHNIQUE, MAKING USE OF HIGH THROUGHPUT TECHNOLOGIES AS DESCRIBED BY CMS-2020-01-R: HCPCS | Performed by: NURSE PRACTITIONER

## 2021-04-03 LAB
SARS-COV-2 RNA RESP QL NAA+PROBE: NOT DETECTED
SPECIMEN SOURCE: NORMAL

## 2021-04-05 RX ORDER — ZOLPIDEM TARTRATE 5 MG/1
TABLET ORAL
Qty: 1 TABLET | Refills: 0 | Status: SHIPPED | OUTPATIENT
Start: 2021-04-05 | End: 2021-05-28

## 2021-04-05 NOTE — TELEPHONE ENCOUNTER
Spoke with patient and group home staff, patient does not feel comfortable repeating the at home sleep test. Patient would prefer to have psg done at the [facility].     Kiki Acosta MA

## 2021-04-07 NOTE — TELEPHONE ENCOUNTER
Patient is scheduled for 4/27/21 at the [facility].    Jagdeep JAVED CMA

## 2021-04-16 DIAGNOSIS — Z79.4 TYPE 2 DIABETES MELLITUS WITH STAGE 3B CHRONIC KIDNEY DISEASE, WITH LONG-TERM CURRENT USE OF INSULIN (H): ICD-10-CM

## 2021-04-16 DIAGNOSIS — E11.22 TYPE 2 DIABETES MELLITUS WITH STAGE 3B CHRONIC KIDNEY DISEASE, WITH LONG-TERM CURRENT USE OF INSULIN (H): ICD-10-CM

## 2021-04-16 DIAGNOSIS — N18.32 TYPE 2 DIABETES MELLITUS WITH STAGE 3B CHRONIC KIDNEY DISEASE, WITH LONG-TERM CURRENT USE OF INSULIN (H): ICD-10-CM

## 2021-04-17 ENCOUNTER — TELEPHONE (OUTPATIENT)
Dept: FAMILY MEDICINE | Facility: CLINIC | Age: 36
End: 2021-04-17

## 2021-04-17 DIAGNOSIS — E11.22 TYPE 2 DIABETES MELLITUS WITH STAGE 3 CHRONIC KIDNEY DISEASE, WITH LONG-TERM CURRENT USE OF INSULIN (H): ICD-10-CM

## 2021-04-17 DIAGNOSIS — N18.30 TYPE 2 DIABETES MELLITUS WITH STAGE 3 CHRONIC KIDNEY DISEASE, WITH LONG-TERM CURRENT USE OF INSULIN (H): ICD-10-CM

## 2021-04-17 DIAGNOSIS — Z79.4 TYPE 2 DIABETES MELLITUS WITH STAGE 3 CHRONIC KIDNEY DISEASE, WITH LONG-TERM CURRENT USE OF INSULIN (H): ICD-10-CM

## 2021-04-17 NOTE — TELEPHONE ENCOUNTER
We need new rx for Accu-chek Guide test strips, Medicare does not allow transferred rx's, also put diagnosis code oon rx.      Thank You,  Shanta Escalante, [facility], [place]

## 2021-04-19 DIAGNOSIS — N18.30 TYPE 2 DIABETES MELLITUS WITH STAGE 3 CHRONIC KIDNEY DISEASE, WITH LONG-TERM CURRENT USE OF INSULIN (H): ICD-10-CM

## 2021-04-19 DIAGNOSIS — Z79.4 TYPE 2 DIABETES MELLITUS WITH STAGE 3 CHRONIC KIDNEY DISEASE, WITH LONG-TERM CURRENT USE OF INSULIN (H): ICD-10-CM

## 2021-04-19 DIAGNOSIS — E11.22 TYPE 2 DIABETES MELLITUS WITH STAGE 3 CHRONIC KIDNEY DISEASE, WITH LONG-TERM CURRENT USE OF INSULIN (H): ICD-10-CM

## 2021-04-19 RX ORDER — BLOOD SUGAR DIAGNOSTIC
STRIP MISCELLANEOUS
Qty: 150 STRIP | Refills: 1 | Status: SHIPPED | OUTPATIENT
Start: 2021-04-19 | End: 2021-07-16

## 2021-04-19 NOTE — TELEPHONE ENCOUNTER
Prescription approved per [facility] Refill Protocol.    Mer Arthur RN  [facility]

## 2021-04-20 DIAGNOSIS — N18.32 TYPE 2 DIABETES MELLITUS WITH STAGE 3B CHRONIC KIDNEY DISEASE, WITH LONG-TERM CURRENT USE OF INSULIN (H): ICD-10-CM

## 2021-04-20 DIAGNOSIS — Z79.4 TYPE 2 DIABETES MELLITUS WITH STAGE 3B CHRONIC KIDNEY DISEASE, WITH LONG-TERM CURRENT USE OF INSULIN (H): ICD-10-CM

## 2021-04-20 DIAGNOSIS — E11.22 TYPE 2 DIABETES MELLITUS WITH STAGE 3B CHRONIC KIDNEY DISEASE, WITH LONG-TERM CURRENT USE OF INSULIN (H): ICD-10-CM

## 2021-04-20 RX ORDER — SEMAGLUTIDE 1.34 MG/ML
INJECTION, SOLUTION SUBCUTANEOUS
Qty: 3 ML | Refills: 5 | Status: CANCELLED | OUTPATIENT
Start: 2021-04-20

## 2021-04-20 RX ORDER — BLOOD SUGAR DIAGNOSTIC
STRIP MISCELLANEOUS
Qty: 100 STRIP | Refills: 1 | Status: SHIPPED | OUTPATIENT
Start: 2021-04-20 | End: 2022-01-14

## 2021-04-20 RX ORDER — SEMAGLUTIDE 1.34 MG/ML
INJECTION, SOLUTION SUBCUTANEOUS
Qty: 3 ML | Refills: 5 | Status: SHIPPED | OUTPATIENT
Start: 2021-04-20 | End: 2022-01-12

## 2021-04-20 NOTE — TELEPHONE ENCOUNTER
"Requested Prescriptions   Pending Prescriptions Disp Refills     blood glucose (ACCU-CHEK GUIDE) test strip [Pharmacy Med Name: Accu-Chek Guide Strip]  10     Sig: TEST BLOOD SUGAR THREE TIMES DAILY       Diabetic Supplies Protocol Passed - 4/19/2021  4:45 AM        Passed - Medication is active on med list        Passed - Patient is 18 years of age or older        Passed - Recent (6 mo) or future (30 days) visit within the authorizing provider's specialty     Patient had office visit in the last 6 months or has a visit in the next 30 days with authorizing provider.  See \"Patient Info\" tab in inbasket, or \"Choose Columns\" in Meds & Orders section of the refill encounter.                 "

## 2021-04-20 NOTE — TELEPHONE ENCOUNTER
"Requested Prescriptions   Pending Prescriptions Disp Refills     semaglutide (OZEMPIC (1 MG/DOSE)) 2 MG/1.5ML pen 3 mL 5       GLP-1 Agonists Protocol Failed - 4/20/2021 11:46 AM        Failed - Normal serum creatinine on file in past 12 months     Recent Labs   Lab Test 03/26/21  0953 09/01/17  1049 09/01/17  1049   CR 1.34*   < >  --    CREAT  --   --  1.6*    < > = values in this interval not displayed.       Ok to refill medication if creatinine is low          Passed - HgbA1C in past 3 or 6 months     If HgbA1C is 8 or greater, it needs to be on file within the past 3 months.  If less than 8, must be on file within the past 6 months.     Recent Labs   Lab Test 03/26/21  0953   A1C 8.0*             Passed - Medication is active on med list        Passed - Patient is age 18 or older        Passed - No active pregnancy on record        Passed - No positive pregnancy test in past 12 months        Passed - Recent (6 mo) or future (30 days) visit within the authorizing provider's specialty     Patient had office visit in the last 6 months or has a visit in the next 30 days with authorizing provider.  See \"Patient Info\" tab in inbasket, or \"Choose Columns\" in Meds & Orders section of the refill encounter.                 "

## 2021-04-26 DIAGNOSIS — J45.21 MILD INTERMITTENT ASTHMA WITH ACUTE EXACERBATION: ICD-10-CM

## 2021-04-26 RX ORDER — ALBUTEROL SULFATE 90 UG/1
AEROSOL, METERED RESPIRATORY (INHALATION)
Qty: 18 G | Refills: 10 | Status: SHIPPED | OUTPATIENT
Start: 2021-04-26 | End: 2022-04-20

## 2021-04-28 ENCOUNTER — MYC MEDICAL ADVICE (OUTPATIENT)
Dept: FAMILY MEDICINE | Facility: CLINIC | Age: 36
End: 2021-04-28

## 2021-04-28 DIAGNOSIS — Z86.69 HISTORY OF EAR INFECTIONS: ICD-10-CM

## 2021-04-28 DIAGNOSIS — H92.03 OTALGIA, BILATERAL: Primary | ICD-10-CM

## 2021-04-28 NOTE — TELEPHONE ENCOUNTER
Routed to provider.  Please see MyChart message from pt asking for referral to ENT.  Pt was last seen 3/26/21 for bilateral ear infection.    Odalys Mattson RN     160

## 2021-04-29 ENCOUNTER — THERAPY VISIT (OUTPATIENT)
Dept: SLEEP MEDICINE | Facility: CLINIC | Age: 36
End: 2021-04-29
Payer: MEDICARE

## 2021-04-29 DIAGNOSIS — R53.81 MALAISE AND FATIGUE: ICD-10-CM

## 2021-04-29 DIAGNOSIS — R06.89 DYSPNEA AND RESPIRATORY ABNORMALITY: ICD-10-CM

## 2021-04-29 DIAGNOSIS — I10 ESSENTIAL HYPERTENSION: ICD-10-CM

## 2021-04-29 DIAGNOSIS — G47.00 INSOMNIA, UNSPECIFIED TYPE: ICD-10-CM

## 2021-04-29 DIAGNOSIS — R06.00 DYSPNEA AND RESPIRATORY ABNORMALITY: ICD-10-CM

## 2021-04-29 DIAGNOSIS — R53.83 MALAISE AND FATIGUE: ICD-10-CM

## 2021-04-29 DIAGNOSIS — Z72.820 LACK OF ADEQUATE SLEEP: ICD-10-CM

## 2021-04-29 PROCEDURE — 95810 POLYSOM 6/> YRS 4/> PARAM: CPT | Performed by: OTOLARYNGOLOGY

## 2021-04-29 RX ORDER — AMOXICILLIN 500 MG/1
500 CAPSULE ORAL 3 TIMES DAILY
Qty: 21 CAPSULE | Refills: 0 | Status: SHIPPED | OUTPATIENT
Start: 2021-04-29 | End: 2021-05-06

## 2021-05-10 LAB — SLPCOMP: NORMAL

## 2021-05-12 ENCOUNTER — OFFICE VISIT (OUTPATIENT)
Dept: AUDIOLOGY | Facility: CLINIC | Age: 36
End: 2021-05-12
Payer: MEDICARE

## 2021-05-12 DIAGNOSIS — H90.0 CONDUCTIVE HEARING LOSS, BILATERAL: Primary | ICD-10-CM

## 2021-05-12 PROCEDURE — 99207 PR NO CHARGE LOS: CPT | Performed by: AUDIOLOGIST

## 2021-05-12 PROCEDURE — 92557 COMPREHENSIVE HEARING TEST: CPT | Performed by: AUDIOLOGIST

## 2021-05-12 PROCEDURE — 92550 TYMPANOMETRY & REFLEX THRESH: CPT | Performed by: AUDIOLOGIST

## 2021-05-12 NOTE — PROGRESS NOTES
AUDIOLOGY REPORT    SUBJECTIVE:  Divina Delgadillo is a 36 year old female who was seen at [facility] for an audiologic evaluation, referred by DR. Patel.  The patient has been seen previously in this clinic on 11/14/2018 for assessment and results indicated a mild to moderate conductive hearing loss bilaterally.  The patient reports recent otalgia and ear drainage bilaterally. She feels her symptoms are worse in her right ear.The patient notes no changes in her hearing. The patient denies bilateral tinnitus. She does have a history of numerous sets of PE tubes as child.     OBJECTIVE:    Otoscopic exam indicates ears are clear of cerumen bilaterally       Pure Tone Thresholds assessed using conventional audiometry with good  reliability from 250-8000 Hz bilaterally using insert earphones and circumaural headphones     RIGHT:  normal and mild conductive hearing loss    LEFT:    normal and mild conductive hearing loss    Tympanogram:    RIGHT: hyper eardrum mobility (Type Ad)    LEFT:    reduced eardrum mobility (Type As)    Reflexes (reported by stimulus ear 1000 Hz):     RIGHT: Ipsilateral is present    RIGHT: Contralateral is absent    LEFT: Ipsilateral is absent    LEFT: Contralateral is absent    Speech Reception Threshold:    RIGHT: 25 dB HL    LEFT:   25 dB HL  Word Recognition Score:     RIGHT: 92% at 65 dB HL using NU-6 recorded word list.    LEFT:   92% at 65 dB HL using NU-6 recorded word list.      ASSESSMENT:   Normal sloping to mild conductive hearing loss bilaterally.     Compared to patient's previous audiogram dated 11/14/2018, hearing has improved bilaterally. Today s results were discussed with the patient in detail.     PLAN: It is recommended that the patient be seen by Dr. Antunez for medical evaluation of their ears and hearing evaluation. Patient states she must leave today and will set up another appointment to see Dr Antunez. Patient was counseled regarding hearing  loss and impact on communication.    Please call this clinic with questions regarding these results or recommendations.        Kita Looney M.A. F-AAA  Clinical audiologist [place] [id number]  5/12/2021

## 2021-05-13 DIAGNOSIS — I10 ESSENTIAL HYPERTENSION: Chronic | ICD-10-CM

## 2021-05-13 DIAGNOSIS — J30.2 CHRONIC SEASONAL ALLERGIC RHINITIS: ICD-10-CM

## 2021-05-13 RX ORDER — LORATADINE 10 MG/1
TABLET ORAL
Qty: 28 TABLET | Refills: 10 | Status: SHIPPED | OUTPATIENT
Start: 2021-05-13 | End: 2022-04-14

## 2021-05-13 RX ORDER — AMLODIPINE BESYLATE 10 MG/1
TABLET ORAL
Qty: 30 TABLET | Refills: 2 | Status: SHIPPED | OUTPATIENT
Start: 2021-05-13 | End: 2021-09-03

## 2021-05-13 RX ORDER — CARVEDILOL 6.25 MG/1
TABLET ORAL
Qty: 60 TABLET | Refills: 2 | Status: SHIPPED | OUTPATIENT
Start: 2021-05-13 | End: 2021-09-03

## 2021-05-13 NOTE — TELEPHONE ENCOUNTER
Routing refill request to provider for review/approval because:  Labs out of range: elevated Creat on 3/26/21.  Advise.  Anthony

## 2021-05-13 NOTE — TELEPHONE ENCOUNTER
Progress Note      Patient Name: Sowmya Hodges   Patient ID: [id number]   Sex: Female   YOB: 1951    Primary Care Provider: Romi Rubio MD   Referring Provider: Chloe VENTURA    Visit Date: May 20, 2020    Provider: Maverick Seymour MD   Location: [facility]   Location Address: [address] [place] [address]  [id number]   Location Phone: [phone]          Chief Complaint     F/u via zoom for epilepsy, anxity. Prior pt of Dr. Turner.       History Of Present Illness  Sowmya Hodges is a 68 year old /White female who presents today to [facility] today referred from Chloe VENTURA.      68-year-old woman follow up video telehealth visit for her seizures.  She states that she has not had any seizures for over 10 years.  The seizures are generalized tonic-clonic seizures in which she bites and sides of her tongue and she is confused afterwards.  She must have had over 20 seizures in her lifetime.  She has been taking Dilantin and phenobarbital for over 50 years and Dr. Turner changed her to Keppra 750 mg twice a day.  She has not had any seizures since she has been on Keppra and she has not had any seizures for over 10 years.  She is being followed by her primary care provider and has routine laboratory work-up.       Past Medical History  Anemia; Anxiety; Arthritis; Constipation; Depression; Diabetes; Diverticulitis; Epilepsy; GERD (gastroesophageal reflux disease); Heart Disease; HTN (hypertension); Hyperlipidemia; Limb Swelling; Lower abdominal pain; TAYLOR (obstructive sleep apnea); Renal cell adenocarcinoma, right; Right Distal Fibula Fracture; Screening for breast cancer; Seasonal allergies; Shortness of Breath         Past Surgical History  Cholecystectomy; Colonoscopy; EGD; Eye Implant; Gallbladder; heart surgery; Heart Valves; Joint Surgery; Knee replacement, right; Nephrectomy, Patial Right; Open Heart Surgery; Salivary Gland  "Requested Prescriptions   Pending Prescriptions Disp Refills     carvedilol (COREG) 6.25 MG tablet [Pharmacy Med Name: Carvedilol 6.25 MG Tablet] 56 tablet 10     Sig: TAKE ONE TABLET BY MOUTH TWICE DAILY       Beta-Blockers Protocol Passed - 11/25/2020 11:30 AM        Passed - Blood pressure under 140/90 in past 12 months     BP Readings from Last 3 Encounters:   11/03/20 110/60   08/27/20 118/60   08/14/20 116/58                 Passed - Patient is age 6 or older        Passed - Recent (12 mo) or future (30 days) visit within the authorizing provider's specialty     Patient has had an office visit with the authorizing provider or a provider within the authorizing providers department within the previous 12 mos or has a future within next 30 days. See \"Patient Info\" tab in inbasket, or \"Choose Columns\" in Meds & Orders section of the refill encounter.              Passed - Medication is active on med list             " Surgery; Tonsilectomy; Tonsillectomy; Wrist         Medication List  Accu-Chek Sabrina Plus test strp miscellaneous strip; Blood Glucose Test miscellaneous strip; blood-glucose meter miscellaneous kit; buspirone 15 mg oral tablet; Farxiga 10 mg oral tablet; Flonase Allergy Relief 50 mcg/actuation nasal spray,suspension; gemfibrozil 600 mg oral tablet; hyoscyamine sulfate 0.125 mg sublingual tablet, sublingual; Januvia 100 mg oral tablet; Keppra 750 mg oral tablet; Klonopin 1 mg oral tablet; Lancets,Ultra Thin 26 gauge miscellaneous misc; Lexapro 20 mg oral tablet; Linzess oral; lisinopril 2.5 mg oral tablet; Motegrity 2 mg oral tablet; multivitamin oral tablet; Nexium 20 mg oral capsule,delayed release(DR/EC); Ozempic 0.25 mg or 0.5 mg(2 mg/1.5 mL) subcutaneous pen injector; Percocet 7.5-325 mg oral tablet; promethazine 12.5 mg oral tablet; Vitamin D3 5,000 unit oral tablet; Xyzal 5 mg oral tablet         Allergy List  Codiene         Family Medical History  Prostate cancer; Gallbladder Disorder; Family history of certain chronic disabling diseases; arthritis; No family history of colorectal cancer         Social History  Alcohol (Never); Claustophobic (Unknown); lives alone; Recreational Drug Use (Never); Retired; Single; Tobacco (Former)         Immunizations  Name Date Admin   Influenza    Influenza    Prevnar 13          Review of Systems  · Constitutional  o Denies  o : chills, excessive sweating, fatigue, fever, sycope/passing out, weight gain, weight loss  · Eyes  o Denies  o : changes in vision, blurry vision, double vision  · HENT  o Denies  o : loss of hearing, ringing in the ears, ear aches, sore throat, nasal congestion, sinus pain, nose bleeds, seasonal allergies  · Cardiovascular  o Denies  o : blood clots, swollen legs, anemia, easy burising or bleeding, transfusions  · Respiratory  o Denies  o : shortness of breath, dry cough, productive cough, pneumonia, COPD  · Gastrointestinal  o Denies  o :  difficulty swallowing, reflux  · Genitourinary  o Denies  o : incontinence  · Neurologic  o Admits  o : seizure  o Denies  o : headache, stroke, tremor, loss of balance, falls, dizziness/vertigo, difficulty with sleep, numbness/tingling/paresthesia , difficulty with coordination, difficulty with dexterity, weakness  · Musculoskeletal  o Denies  o : neck stiffness/pain, swollen lymph nodes, muscle aches, joint pain, weakness, spasms, sciatica, pain radiating in arm, pain radiating in leg, low back pain  · Endocrine  o Denies  o : diabetes, thyroid disorder  · Psychiatric  o Denies  o : anxiety, depression      Physical Examination     She is alert, fluent, phasic, follows commands well.           Assessment  · Generalized seizure disorder     345.90/G40.309  She is to continue taking levetiracetam 750 mg twice a day. Should she have any problems she is to call our office. Should she have any recurrent seizures she can go to the emergency room otherwise I will see her again in 1 year's time for follow-up.    Problems Reconciled  Plan  · Medications  o Medications have been Reconciled  o Transition of Care or Provider Policy  · Instructions  o Encouraged to follow-up with Primary Care Provider for preventative care.  o Follow up in 1 year.            Electronically Signed by: Maverick Seymour MD -Author on May 20, 2020 03:51:53 PM

## 2021-05-25 NOTE — PROGRESS NOTES
Chief Complaint   Patient presents with     Follow Up     go over hearing test results done on 5/12/21     History of Present Illness  Divina Delgadillo is a 36 year old female who presents to today for ear evaluation. The patient reports some decreased hearing in both ears, intermittent dull otalgia.  She does have some mild foul-smelling otorrhea more so on the right-hand side.  No bloody otorrhea.  She does intermittently have some problems of dizziness and imbalance usually during the day.  She has had ear tubes as a child but no other recent ear surgery.  No significant noise exposure history, heavy machinery, farm equipment, firearm use.  She does have intermittent tinnitus.    The patient underwent an audiogram performed 5/12/2021.  My review of the audiogram showed mild sloping to moderate mixed hearing loss in both ears.  Pure-tone average was 26 dB on the right and 28 dB on the left.  Speech reception threshhold is 25 dB on the right and 25 dB on the left.  The patient had 92% word recognition on the right and 92% word recognition on the left.  The patient had a type A deep tympanogram on the right and a type A shallow tympanogram on the left.  This audiogram was much better than her audiogram from 2018 when she had significantly flat temps and more of a conductive hearing loss.    Past Medical History  Patient Active Problem List   Diagnosis     Esophageal reflux     NAFL (nonalcoholic fatty liver)     Essential hypertension     Hyperlipidemia LDL goal <100     Stage 3 chronic kidney disease     Mucinous neoplasm of pancreas     Type 2 diabetes mellitus with stage 3 chronic kidney disease, with long-term current use of insulin (H)     Bipolar disorder (H)     Cervical high risk HPV (human papillomavirus) test positive     IUD (intrauterine device) in place     Morbid obesity (H)     Mild intermittent asthma with acute exacerbation     Nicotine abuse     Chronic leukocytosis     Acquired asplenia      Borderline personality disorder (H)     Asthma     PTSD (post-traumatic stress disorder)     Long term (current) use of anticoagulants     Orthostatic hypotension     Tobacco abuse     Vitamin D insufficiency     Depressive disorder     Schizoaffective disorder, depressive type (H)     Uncontrolled type 2 diabetes mellitus with diabetic polyneuropathy, with long-term current use of insulin (H)     Cardiac murmur     Type 2 diabetes mellitus (H)     History of DVT of arm  (deep vein thrombosis)     Current Medications     Current Outpatient Medications:      amLODIPine (NORVASC) 10 MG tablet, TAKE ONE TABLET BY MOUTH DAILY, Disp: 30 tablet, Rfl: 2     ARIPiprazole (ABILIFY) 30 MG tablet, Take 30 mg by mouth daily, Disp: , Rfl:      atorvastatin (LIPITOR) 10 MG tablet, TAKE ONE TABLET BY MOUTH DAILY, Disp: 30 tablet, Rfl: 5     BIOTIN FORTE 5 MG TABS, TAKE ONE TABLET BY MOUTH DAILY, Disp: 30 tablet, Rfl: 3     carvedilol (COREG) 6.25 MG tablet, TAKE ONE TABLET BY MOUTH TWICE DAILY, Disp: 60 tablet, Rfl: 2     ciprofloxacin-dexamethasone (CIPRODEX) 0.3-0.1 % otic suspension, Place 5 drops in both ears twice daily for 14 days., Disp: 10 mL, Rfl: 3     cloZAPine (CLOZARIL) 100 MG tablet, Take 100 mg by mouth 2 times daily , Disp: , Rfl:      FLUoxetine (PROZAC) 10 MG capsule, Take 1 capsule (10 mg) by mouth daily Plus 20 mg capsule, total 30 mg daily, Disp: 30 capsule, Rfl: 2     FLUoxetine (PROZAC) 20 MG capsule, Take 1 capsule (20 mg) by mouth daily, Disp: 30 capsule, Rfl: 2     gabapentin (NEURONTIN) 300 MG capsule, Take 1 capsule (300 mg) by mouth At Bedtime, Disp: 30 capsule, Rfl: 3     insulin glargine (BASAGLAR KWIKPEN) 100 UNIT/ML pen, INJECT 25 UNITS SUB Q DAILY, Disp: 9 mL, Rfl: 10     lamoTRIgine (LAMICTAL) 100 MG tablet, Take 100 mg by mouth At Bedtime, Disp: , Rfl:      lisinopril (ZESTRIL) 5 MG tablet, TAKE ONE TABLET BY MOUTH DAILY, Disp: 30 tablet, Rfl: 3     loratadine (CLARITIN) 10 MG tablet, TAKE ONE  TABLET BY MOUTH EVERY MORNING, Disp: 28 tablet, Rfl: 10     montelukast (SINGULAIR) 10 MG tablet, TAKE ONE TABLET BY MOUTH AT BEDTIME, Disp: 28 tablet, Rfl: 5     NOVOLOG FLEXPEN 100 UNIT/ML soln, INJECT 1 TO 5 UNITS SUB Q THREE TIMES DAILY WITH MEALS PER SLIDING SCALE, Disp: 9 mL, Rfl: 10     omeprazole (PRILOSEC) 20 MG DR capsule, TAKE ONE  CAPSULE BY MOUTH DAILY, Disp: 28 capsule, Rfl: 10     OZEMPIC, 1 MG/DOSE, 2 MG/1.5ML pen, INJECT 1MG SUB-Q EVERY 7 DAYS (ON THURSDAYS), Disp: 3 mL, Rfl: 5     traZODone (DESYREL) 50 MG tablet, Take 50 mg by mouth, Disp: , Rfl:      VENTOLIN  (90 Base) MCG/ACT inhaler, INHALE 2 PUFFS INTO THE LUNGS EVERY SIX  HOURS AS NEEDED FOR SHORTNESS OF BREATH, Disp: 18 g, Rfl: 10     blood glucose (ACCU-CHEK GUIDE) test strip, TEST BLOOD SUGAR THREE TIMES DAILY, Disp: 100 strip, Rfl: 1     blood glucose (ACCU-CHEK GUIDE) test strip, Use to test blood sugar 3 times daily (accu chek GUIDE)., Disp: 150 strip, Rfl: 1     blood glucose monitoring (ACCU-CHEK FASTCLIX) lancets, Use to test blood sugar 3 times daily, Disp: 102 each, Rfl: 3     ULTICARE MINI 31G X 6 MM insulin pen needle, USE 1 PEN NEEDLE DAILY, Disp: 100 each, Rfl: 10    Allergies  Allergies   Allergen Reactions     Acetaminophen Other (See Comments)     pt previously tried to overdose on it, PMD does not want pt taking per pt.      Latex Rash       Social History   Social History     Socioeconomic History     Marital status: Single     Spouse name: Not on file     Number of children: 0     Years of education: Not on file     Highest education level: Not on file   Occupational History     Not on file   Social Needs     Financial resource strain: Not on file     Food insecurity     Worry: Not on file     Inability: Not on file     Transportation needs     Medical: Not on file     Non-medical: Not on file   Tobacco Use     Smoking status: Current Every Day Smoker     Packs/day: 1.00     Years: 4.00     Pack years: 4.00      Types: Cigarettes     Last attempt to quit: 2019     Years since quittin.9     Smokeless tobacco: Never Used     Tobacco comment: 15 cigarettes a day    Substance and Sexual Activity     Alcohol use: No     Drug use: No     Sexual activity: Not Currently     Partners: Female     Birth control/protection: I.U.D.     Comment: Both male and female    Lifestyle     Physical activity     Days per week: Not on file     Minutes per session: Not on file     Stress: Not on file   Relationships     Social connections     Talks on phone: Not on file     Gets together: Not on file     Attends Mosque service: Not on file     Active member of club or organization: Not on file     Attends meetings of clubs or organizations: Not on file     Relationship status: Not on file     Intimate partner violence     Fear of current or ex partner: Not on file     Emotionally abused: Not on file     Physically abused: Not on file     Forced sexual activity: Not on file   Other Topics Concern     Parent/sibling w/ CABG, MI or angioplasty before 65F 55M? No   Social History Narrative     Not on file       Family History  Family History   Problem Relation Age of Onset     Respiratory Mother         ASTHMA     Allergies Mother      Depression Mother      Heart Disease Father         HEART VALVE REPLACEMENT     Hypertension Father      Diabetes Father      Depression Father      Respiratory Brother      Allergies Brother      Respiratory Sister      Allergies Sister      Depression Sister      Respiratory Sister      Allergies Sister      Depression Sister      Kidney Disease No family hx of        Review of Systems  As per HPI and PMHx, otherwise 10+ comprehensive system review is negative.    Physical Exam  /73 (BP Location: Right arm, Patient Position: Chair, Cuff Size: Adult Regular)   Pulse 85   Temp 98.9  F (37.2  C) (Tympanic)   Wt 86.2 kg (190 lb)   BMI 33.66 kg/m    GENERAL: Patient is a pleasant, cooperative 36 year  old female in no acute distress.  HEAD: Normocephalic, atraumatic.  Hair and scalp are normal.  EYES: Pupils are equal, round, reactive to light and accommodation.  Extraocular movements are intact.  The sclera nonicteric without injection.  The extraocular structures are normal.  EARS: See below.   NEUROLOGIC: Cranial nerves II through XII are grossly intact.  Voice is strong.  Facial nerve examination incomplete due to the patient wearing a mask.  CARDIOVASCULAR: Extremities are warm and well-perfused.  No significant peripheral edema.  RESPIRATORY: Patient has nonlabored breathing without cough, wheeze, stridor.  PSYCHIATRIC: Patient is alert and oriented.  Mood and affect appear normal.  SKIN: Warm and dry.  No scalp, face, or neck lesions noted.    Physical Exam and Procedure    EARS: Normal shape and symmetry.  No tenderness when palpating the mastoid or tragal areas bilaterally.  The ears were then examined under the otic binocular microscope to perform cerumen removal.  Otoscopic exam on the right reveals moist ceruminous debris and otorrhea impacted down to the level of the tympanic membrane.  There is granulation of the tympanic membrane posteriorly at the margin of the annulus.  The ear was debrided with a #3 and #5 suction.  Some crusting was removed with an alligator forceps. The right tympanic membrane is intact with severe retraction onto the middle ear structures.  There are no retraction pockets or evidence of cholesteatoma.      Attention was turned to the left ear.  Otoscopic exam on the left reveals impacted moist ceruminous debris and otorrhea down to the level of the tympanic membrane.  There is granulation at the posterior margin of the annulus.  There is debrided with a #3 #5 suction.  Some crusting was removed with an alligator forceps.  The left tympanic membrane is intact with severe retraction anteriorly onto the middle ear structures.  There are no retraction pockets or evidence of  cholesteatoma.    Assessment and Plan     ICD-10-CM    1. Retracted tympanic membrane, bilateral  H73.893 Remove Cerumen     ciprofloxacin-dexamethasone (CIPRODEX) 0.3-0.1 % otic suspension   2. Chronic Eustachian tube dysfunction, bilateral  H69.83 Remove Cerumen     ciprofloxacin-dexamethasone (CIPRODEX) 0.3-0.1 % otic suspension   3. Granuloma of tympanic membrane, bilateral  H71.13 Remove Cerumen     ciprofloxacin-dexamethasone (CIPRODEX) 0.3-0.1 % otic suspension   4. Otorrhea, bilateral  H92.13 Remove Cerumen     ciprofloxacin-dexamethasone (CIPRODEX) 0.3-0.1 % otic suspension   5. Bilateral impacted cerumen  H61.23    6. Tobacco dependence syndrome  F17.200    7. Encounter for tobacco use cessation counseling  Z71.6      It was my pleasure seeing Divina Delgadillo today in clinic.  Patient has obvious bilateral chronic middle ear disease.  She has evidence of otitis externa and otorrhea bilaterally.  She has tympanic membrane granulation bilaterally.  I think we will treat her with Ciprodex drops 5 drops to both ears twice a day for 2 weeks.  I will have her come back and see me 1 to 2 weeks after finishing the drops.  She might be a good candidate for ear tube placement to help her severe retraction and her symptoms.  Left to see how much she improves after drop treatment.  We discussed placing ear tubes awake or in the operating room.  The patient will think about her options.  The patient knows to contact me if she is having any problems or concerns.  We discussed water precautions for the ear and not placing anything in the ear except for eardrops.    Migue Antunez MD  Department of Otolarygology-Head and Neck Surgery  [facility]

## 2021-05-26 ENCOUNTER — OFFICE VISIT (OUTPATIENT)
Dept: PODIATRY | Facility: CLINIC | Age: 36
End: 2021-05-26
Payer: MEDICARE

## 2021-05-26 ENCOUNTER — TELEPHONE (OUTPATIENT)
Dept: NEPHROLOGY | Facility: CLINIC | Age: 36
End: 2021-05-26

## 2021-05-26 VITALS
HEART RATE: 86 BPM | HEIGHT: 63 IN | SYSTOLIC BLOOD PRESSURE: 112 MMHG | WEIGHT: 190 LBS | BODY MASS INDEX: 33.66 KG/M2 | DIASTOLIC BLOOD PRESSURE: 59 MMHG

## 2021-05-26 DIAGNOSIS — N18.30 STAGE 3 CHRONIC KIDNEY DISEASE (H): Primary | Chronic | ICD-10-CM

## 2021-05-26 DIAGNOSIS — L84 CALLUS OF FOOT: ICD-10-CM

## 2021-05-26 PROCEDURE — 11056 PARNG/CUTG B9 HYPRKR LES 2-4: CPT | Performed by: PODIATRIST

## 2021-05-26 ASSESSMENT — MIFFLIN-ST. JEOR: SCORE: 1525.96

## 2021-05-26 NOTE — PROGRESS NOTES
PATIENT HISTORY:  Divina Delgadillo is a 36 year old female who presents to clinic for a diabetic foot evaluation.  The patient relates developing painful calluses on the balls of both feet.  The patient relates some numbness to the feet.  The patient denies any redness, swelling or open sores on both feet.         REVIEW OF SYSTEMS:  Constitutional, HEENT, cardiovascular, pulmonary, GI, , musculoskeletal, neuro, skin, endocrine and psych systems are negative, except as otherwise noted.     PAST MEDICAL HISTORY:   Past Medical History:   Diagnosis Date     Acute pain of left knee 03/22/2019     Acute-on-chronic kidney injury (H) 03/22/2019     ARF (acute renal failure) (H) 03/23/2019     Cervical high risk HPV (human papillomavirus) test positive 06/20/2017     Deep venous thrombosis of arm (H) 1/6/2017    ultrasound 1/6/2017-  venous thrombus in the antecubital fossa region and the left forearm as described     Depressive disorder, not elsewhere classified      DVT (deep venous thrombosis) (H)      Dysmetabolic syndrome X      Esophageal reflux     GERD     Mild intermittent asthma      Near syncope 03/22/2019     Other specified types of schizophrenia, unspecified condition      Pancreatic cancer (H)     left adrenal gland, partial pancreas, and spleen removed; no chemo or radiation.      Suicidal behavior 05/26/2020     Type II or unspecified type diabetes mellitus without mention of complication, not stated as uncontrolled         PAST SURGICAL HISTORY:   Past Surgical History:   Procedure Laterality Date     ADRENALECTOMY Left 2015     ENT SURGERY  10/01/2000    Tympanoplasty     IR LIVER BIOPSY PERCUTANEOUS  11/14/2019     PANCREATECTOMY PARTIAL  2015     PERCUTANEOUS BIOPSY LIVER Right 11/14/2019    Procedure: Percutaneous Liver Biopsy;  Surgeon: Sean Guzman PA-C;  Location: [facility] OR     SPLENECTOMY  2015     TONSILLECTOMY  10/01/2000    Tonsillectomy        MEDICATIONS:   Current Outpatient  Medications:      amLODIPine (NORVASC) 10 MG tablet, TAKE ONE TABLET BY MOUTH DAILY, Disp: 30 tablet, Rfl: 2     ARIPiprazole (ABILIFY) 30 MG tablet, Take 30 mg by mouth daily, Disp: , Rfl:      atorvastatin (LIPITOR) 10 MG tablet, TAKE ONE TABLET BY MOUTH DAILY, Disp: 30 tablet, Rfl: 5     BIOTIN FORTE 5 MG TABS, TAKE ONE TABLET BY MOUTH DAILY, Disp: 30 tablet, Rfl: 3     blood glucose (ACCU-CHEK GUIDE) test strip, TEST BLOOD SUGAR THREE TIMES DAILY, Disp: 100 strip, Rfl: 1     blood glucose (ACCU-CHEK GUIDE) test strip, Use to test blood sugar 3 times daily (accu chek GUIDE)., Disp: 150 strip, Rfl: 1     blood glucose monitoring (ACCU-CHEK FASTCLIX) lancets, Use to test blood sugar 3 times daily, Disp: 102 each, Rfl: 3     carvedilol (COREG) 6.25 MG tablet, TAKE ONE TABLET BY MOUTH TWICE DAILY, Disp: 60 tablet, Rfl: 2     cloZAPine (CLOZARIL) 100 MG tablet, Take 100 mg by mouth 2 times daily , Disp: , Rfl:      FLUoxetine (PROZAC) 10 MG capsule, Take 1 capsule (10 mg) by mouth daily Plus 20 mg capsule, total 30 mg daily, Disp: 30 capsule, Rfl: 2     FLUoxetine (PROZAC) 20 MG capsule, Take 1 capsule (20 mg) by mouth daily, Disp: 30 capsule, Rfl: 2     gabapentin (NEURONTIN) 300 MG capsule, Take 1 capsule (300 mg) by mouth At Bedtime, Disp: 30 capsule, Rfl: 3     insulin glargine (BASAGLAR KWIKPEN) 100 UNIT/ML pen, INJECT 25 UNITS SUB Q DAILY, Disp: 9 mL, Rfl: 10     lamoTRIgine (LAMICTAL) 100 MG tablet, Take 100 mg by mouth At Bedtime, Disp: , Rfl:      lisinopril (ZESTRIL) 5 MG tablet, TAKE ONE TABLET BY MOUTH DAILY, Disp: 30 tablet, Rfl: 3     loratadine (CLARITIN) 10 MG tablet, TAKE ONE TABLET BY MOUTH EVERY MORNING, Disp: 28 tablet, Rfl: 10     montelukast (SINGULAIR) 10 MG tablet, TAKE ONE TABLET BY MOUTH AT BEDTIME, Disp: 28 tablet, Rfl: 5     NOVOLOG FLEXPEN 100 UNIT/ML soln, INJECT 1 TO 5 UNITS SUB Q THREE TIMES DAILY WITH MEALS PER SLIDING SCALE, Disp: 9 mL, Rfl: 10     omeprazole (PRILOSEC) 20 MG DR  capsule, TAKE ONE  CAPSULE BY MOUTH DAILY, Disp: 28 capsule, Rfl: 10     OZEMPIC, 1 MG/DOSE, 2 MG/1.5ML pen, INJECT 1MG SUB-Q EVERY 7 DAYS (ON ), Disp: 3 mL, Rfl: 5     traZODone (DESYREL) 50 MG tablet, Take 50 mg by mouth, Disp: , Rfl:      ULTICARE MINI 31G X 6 MM insulin pen needle, USE 1 PEN NEEDLE DAILY, Disp: 100 each, Rfl: 10     VENTOLIN  (90 Base) MCG/ACT inhaler, INHALE 2 PUFFS INTO THE LUNGS EVERY SIX  HOURS AS NEEDED FOR SHORTNESS OF BREATH, Disp: 18 g, Rfl: 10     zolpidem (AMBIEN) 5 MG tablet, Take tablet by mouth 15 minutes prior to sleep, for Sleep Study, Disp: 1 tablet, Rfl: 0     ALLERGIES:    Allergies   Allergen Reactions     Acetaminophen Other (See Comments)     pt previously tried to overdose on it, PMD does not want pt taking per pt.      Latex Rash        SOCIAL HISTORY:   Social History     Socioeconomic History     Marital status: Single     Spouse name: Not on file     Number of children: 0     Years of education: Not on file     Highest education level: Not on file   Occupational History     Not on file   Social Needs     Financial resource strain: Not on file     Food insecurity     Worry: Not on file     Inability: Not on file     Transportation needs     Medical: Not on file     Non-medical: Not on file   Tobacco Use     Smoking status: Current Every Day Smoker     Packs/day: 1.00     Years: 4.00     Pack years: 4.00     Types: Cigarettes     Last attempt to quit: 2019     Years since quittin.9     Smokeless tobacco: Never Used     Tobacco comment: 15 cigarettes a day    Substance and Sexual Activity     Alcohol use: No     Drug use: No     Sexual activity: Not Currently     Partners: Female     Birth control/protection: I.U.D.     Comment: Both male and female    Lifestyle     Physical activity     Days per week: Not on file     Minutes per session: Not on file     Stress: Not on file   Relationships     Social connections     Talks on phone: Not on file      "Gets together: Not on file     Attends Baptism service: Not on file     Active member of club or organization: Not on file     Attends meetings of clubs or organizations: Not on file     Relationship status: Not on file     Intimate partner violence     Fear of current or ex partner: Not on file     Emotionally abused: Not on file     Physically abused: Not on file     Forced sexual activity: Not on file   Other Topics Concern     Parent/sibling w/ CABG, MI or angioplasty before 65F 55M? No   Social History Narrative     Not on file        FAMILY HISTORY:   Family History   Problem Relation Age of Onset     Respiratory Mother         ASTHMA     Allergies Mother      Depression Mother      Heart Disease Father         HEART VALVE REPLACEMENT     Hypertension Father      Diabetes Father      Depression Father      Respiratory Brother      Allergies Brother      Respiratory Sister      Allergies Sister      Depression Sister      Respiratory Sister      Allergies Sister      Depression Sister      Kidney Disease No family hx of         Vitals: /59   Pulse 86   Ht 1.6 m (5' 3\")   Wt 86.2 kg (190 lb)   BMI 33.66 kg/m         Lower Extremity Evaluation:     Dermatologic: Skin is intact to both lower extremities without significant lesions, rash or abrasion.  No paronychia or evidence of soft tissue infection is noted.  Noted hyperkeratotic lesions on the plantar aspect of the first and fifth metatarsal heads bilaterally.  No surrounding erythema noted.  No subdermal hemorrhage noted.    Vascular: DP & PT pulses are intact & regular bilaterally.   Capillary filling and skin temperature is normal to both lower extremities.  There are no varicosities, no edema and no trophic changes noted.      Neurologic:   No evidence of weakness in the lower extremities.  Noted evidence of neuropathy with diminished sensation bilaterally per [place] monofilament exam.       Musculoskeletal: Patient is ambulatory " without assistive device or brace.  No gross ankle deformity noted.  No foot or ankle joint effusion is noted.  One notes hammertoe contracture of the lesser toes bilaterally.    Assessment:    1.      ICD-10-CM    1. Uncontrolled type 2 diabetes mellitus with diabetic polyneuropathy, with long-term current use of insulin (H)  E11.42     Z79.4     E11.65    2. Callus of foot  L84            Plan:  I have explained to the patient the underlying condition affecting the feet.  Hyperkeratotic skin lesions were sharply debrided with a #10 blade down to healthy epidermis.  No bleeding noted.  I have discussed with the patient the importance of diabetic foot care and daily inspection of the feet.  The patient was instructed to come in anytime if there is any concern about the feet with redness, swelling or drainage is noted.    There is low risk of morbidity with the procedure.  There was no overlap in work associated with the evaluation/management and the work associated with the procedure.    Divina verbalized agreement with and understanding of the rational for the diagnosis and treatment plan.  All questions were answered to best of my ability and the patient's satisfaction. The patient was advised to contact the clinic with any questions that may arise after the clinic visit.      Disclaimer: This note consists of symbols derived from keyboarding, dictation and/or voice recognition software. As a result, there may be errors in the script that have gone undetected. Please consider this when interpreting information found in this chart.        ERNA Douglas D.P.M., [email or web address].

## 2021-05-26 NOTE — NURSING NOTE
"Chief Complaint   Patient presents with     RECHECK     callus of [place] feet       Initial /59   Pulse 86   Ht 1.6 m (5' 3\")   Wt 86.2 kg (190 lb)   BMI 33.66 kg/m   Estimated body mass index is 33.66 kg/m  as calculated from the following:    Height as of this encounter: 1.6 m (5' 3\").    Weight as of this encounter: 86.2 kg (190 lb).  Medications and allergies reviewed.      Pamela COOK MA    "

## 2021-05-26 NOTE — TELEPHONE ENCOUNTER
[facility]    Phone Message    May a detailed message be left on voicemail: yes     Reason for Call: Order(s): Other:   Reason for requested: Order for labs needed.  Per last appointment with Dr. Dias in 12/2020, labs needed in 6 months.  Patient to schedule labs for [facility]  Date needed: ASAP  Provider name: Dr. Dias      Action Taken: Message routed to:  [facility] Nephrology    Travel Screening: Not Applicable

## 2021-05-26 NOTE — LETTER
5/26/2021         RE: Divina Delgadillo  [address]  [place] [address]        Dear Colleague,    Thank you for referring your patient, Divina Delgadillo, to the [facility] [place]. Please see a copy of my visit note below.    PATIENT HISTORY:  Divina Delgadillo is a 36 year old female who presents to clinic for a diabetic foot evaluation.  The patient relates developing painful calluses on the balls of both feet.  The patient relates some numbness to the feet.  The patient denies any redness, swelling or open sores on both feet.         REVIEW OF SYSTEMS:  Constitutional, HEENT, cardiovascular, pulmonary, GI, , musculoskeletal, neuro, skin, endocrine and psych systems are negative, except as otherwise noted.     PAST MEDICAL HISTORY:   Past Medical History:   Diagnosis Date     Acute pain of left knee 03/22/2019     Acute-on-chronic kidney injury (H) 03/22/2019     ARF (acute renal failure) (H) 03/23/2019     Cervical high risk HPV (human papillomavirus) test positive 06/20/2017     Deep venous thrombosis of arm (H) 1/6/2017    ultrasound 1/6/2017-  venous thrombus in the antecubital fossa region and the left forearm as described     Depressive disorder, not elsewhere classified      DVT (deep venous thrombosis) (H)      Dysmetabolic syndrome X      Esophageal reflux     GERD     Mild intermittent asthma      Near syncope 03/22/2019     Other specified types of schizophrenia, unspecified condition      Pancreatic cancer (H)     left adrenal gland, partial pancreas, and spleen removed; no chemo or radiation.      Suicidal behavior 05/26/2020     Type II or unspecified type diabetes mellitus without mention of complication, not stated as uncontrolled         PAST SURGICAL HISTORY:   Past Surgical History:   Procedure Laterality Date     ADRENALECTOMY Left 2015     ENT SURGERY  10/01/2000    Tympanoplasty     IR LIVER BIOPSY PERCUTANEOUS  11/14/2019     PANCREATECTOMY  PARTIAL  2015     PERCUTANEOUS BIOPSY LIVER Right 11/14/2019    Procedure: Percutaneous Liver Biopsy;  Surgeon: Sean Guzman PA-C;  Location: [facility] OR     SPLENECTOMY  2015     TONSILLECTOMY  10/01/2000    Tonsillectomy        MEDICATIONS:   Current Outpatient Medications:      amLODIPine (NORVASC) 10 MG tablet, TAKE ONE TABLET BY MOUTH DAILY, Disp: 30 tablet, Rfl: 2     ARIPiprazole (ABILIFY) 30 MG tablet, Take 30 mg by mouth daily, Disp: , Rfl:      atorvastatin (LIPITOR) 10 MG tablet, TAKE ONE TABLET BY MOUTH DAILY, Disp: 30 tablet, Rfl: 5     BIOTIN FORTE 5 MG TABS, TAKE ONE TABLET BY MOUTH DAILY, Disp: 30 tablet, Rfl: 3     blood glucose (ACCU-CHEK GUIDE) test strip, TEST BLOOD SUGAR THREE TIMES DAILY, Disp: 100 strip, Rfl: 1     blood glucose (ACCU-CHEK GUIDE) test strip, Use to test blood sugar 3 times daily (accu chek GUIDE)., Disp: 150 strip, Rfl: 1     blood glucose monitoring (ACCU-CHEK FASTCLIX) lancets, Use to test blood sugar 3 times daily, Disp: 102 each, Rfl: 3     carvedilol (COREG) 6.25 MG tablet, TAKE ONE TABLET BY MOUTH TWICE DAILY, Disp: 60 tablet, Rfl: 2     cloZAPine (CLOZARIL) 100 MG tablet, Take 100 mg by mouth 2 times daily , Disp: , Rfl:      FLUoxetine (PROZAC) 10 MG capsule, Take 1 capsule (10 mg) by mouth daily Plus 20 mg capsule, total 30 mg daily, Disp: 30 capsule, Rfl: 2     FLUoxetine (PROZAC) 20 MG capsule, Take 1 capsule (20 mg) by mouth daily, Disp: 30 capsule, Rfl: 2     gabapentin (NEURONTIN) 300 MG capsule, Take 1 capsule (300 mg) by mouth At Bedtime, Disp: 30 capsule, Rfl: 3     insulin glargine (BASAGLAR KWIKPEN) 100 UNIT/ML pen, INJECT 25 UNITS SUB Q DAILY, Disp: 9 mL, Rfl: 10     lamoTRIgine (LAMICTAL) 100 MG tablet, Take 100 mg by mouth At Bedtime, Disp: , Rfl:      lisinopril (ZESTRIL) 5 MG tablet, TAKE ONE TABLET BY MOUTH DAILY, Disp: 30 tablet, Rfl: 3     loratadine (CLARITIN) 10 MG tablet, TAKE ONE TABLET BY MOUTH EVERY MORNING, Disp: 28 tablet, Rfl: 10      montelukast (SINGULAIR) 10 MG tablet, TAKE ONE TABLET BY MOUTH AT BEDTIME, Disp: 28 tablet, Rfl: 5     NOVOLOG FLEXPEN 100 UNIT/ML soln, INJECT 1 TO 5 UNITS SUB Q THREE TIMES DAILY WITH MEALS PER SLIDING SCALE, Disp: 9 mL, Rfl: 10     omeprazole (PRILOSEC) 20 MG DR capsule, TAKE ONE  CAPSULE BY MOUTH DAILY, Disp: 28 capsule, Rfl: 10     OZEMPIC, 1 MG/DOSE, 2 MG/1.5ML pen, INJECT 1MG SUB-Q EVERY 7 DAYS (ON ), Disp: 3 mL, Rfl: 5     traZODone (DESYREL) 50 MG tablet, Take 50 mg by mouth, Disp: , Rfl:      ULTICARE MINI 31G X 6 MM insulin pen needle, USE 1 PEN NEEDLE DAILY, Disp: 100 each, Rfl: 10     VENTOLIN  (90 Base) MCG/ACT inhaler, INHALE 2 PUFFS INTO THE LUNGS EVERY SIX  HOURS AS NEEDED FOR SHORTNESS OF BREATH, Disp: 18 g, Rfl: 10     zolpidem (AMBIEN) 5 MG tablet, Take tablet by mouth 15 minutes prior to sleep, for Sleep Study, Disp: 1 tablet, Rfl: 0     ALLERGIES:    Allergies   Allergen Reactions     Acetaminophen Other (See Comments)     pt previously tried to overdose on it, PMD does not want pt taking per pt.      Latex Rash        SOCIAL HISTORY:   Social History     Socioeconomic History     Marital status: Single     Spouse name: Not on file     Number of children: 0     Years of education: Not on file     Highest education level: Not on file   Occupational History     Not on file   Social Needs     Financial resource strain: Not on file     Food insecurity     Worry: Not on file     Inability: Not on file     Transportation needs     Medical: Not on file     Non-medical: Not on file   Tobacco Use     Smoking status: Current Every Day Smoker     Packs/day: 1.00     Years: 4.00     Pack years: 4.00     Types: Cigarettes     Last attempt to quit: 2019     Years since quittin.9     Smokeless tobacco: Never Used     Tobacco comment: 15 cigarettes a day    Substance and Sexual Activity     Alcohol use: No     Drug use: No     Sexual activity: Not Currently     Partners: Female      "Birth control/protection: I.U.D.     Comment: Both male and female    Lifestyle     Physical activity     Days per week: Not on file     Minutes per session: Not on file     Stress: Not on file   Relationships     Social connections     Talks on phone: Not on file     Gets together: Not on file     Attends Yazidi service: Not on file     Active member of club or organization: Not on file     Attends meetings of clubs or organizations: Not on file     Relationship status: Not on file     Intimate partner violence     Fear of current or ex partner: Not on file     Emotionally abused: Not on file     Physically abused: Not on file     Forced sexual activity: Not on file   Other Topics Concern     Parent/sibling w/ CABG, MI or angioplasty before 65F 55M? No   Social History Narrative     Not on file        FAMILY HISTORY:   Family History   Problem Relation Age of Onset     Respiratory Mother         ASTHMA     Allergies Mother      Depression Mother      Heart Disease Father         HEART VALVE REPLACEMENT     Hypertension Father      Diabetes Father      Depression Father      Respiratory Brother      Allergies Brother      Respiratory Sister      Allergies Sister      Depression Sister      Respiratory Sister      Allergies Sister      Depression Sister      Kidney Disease No family hx of         Vitals: /59   Pulse 86   Ht 1.6 m (5' 3\")   Wt 86.2 kg (190 lb)   BMI 33.66 kg/m         Lower Extremity Evaluation:     Dermatologic: Skin is intact to both lower extremities without significant lesions, rash or abrasion.  No paronychia or evidence of soft tissue infection is noted.  Noted hyperkeratotic lesions on the plantar aspect of the first and fifth metatarsal heads bilaterally.  No surrounding erythema noted.  No subdermal hemorrhage noted.    Vascular: DP & PT pulses are intact & regular bilaterally.   Capillary filling and skin temperature is normal to both lower extremities.  There are no " varicosities, no edema and no trophic changes noted.      Neurologic:   No evidence of weakness in the lower extremities.  Noted evidence of neuropathy with diminished sensation bilaterally per [place] monofilament exam.       Musculoskeletal: Patient is ambulatory without assistive device or brace.  No gross ankle deformity noted.  No foot or ankle joint effusion is noted.  One notes hammertoe contracture of the lesser toes bilaterally.    Assessment:    1.      ICD-10-CM    1. Uncontrolled type 2 diabetes mellitus with diabetic polyneuropathy, with long-term current use of insulin (H)  E11.42     Z79.4     E11.65    2. Callus of foot  L84            Plan:  I have explained to the patient the underlying condition affecting the feet.  Hyperkeratotic skin lesions were sharply debrided with a #10 blade down to healthy epidermis.  No bleeding noted.  I have discussed with the patient the importance of diabetic foot care and daily inspection of the feet.  The patient was instructed to come in anytime if there is any concern about the feet with redness, swelling or drainage is noted.    There is low risk of morbidity with the procedure.  There was no overlap in work associated with the evaluation/management and the work associated with the procedure.    Divina verbalized agreement with and understanding of the rational for the diagnosis and treatment plan.  All questions were answered to best of my ability and the patient's satisfaction. The patient was advised to contact the clinic with any questions that may arise after the clinic visit.      Disclaimer: This note consists of symbols derived from keyboarding, dictation and/or voice recognition software. As a result, there may be errors in the script that have gone undetected. Please consider this when interpreting information found in this chart.        JOYCE Hinson.P.MACI., F.A.C.F.A.S.            Again, thank you for allowing me to participate in the  care of your patient.        Sincerely,        Raul Douglas DPM

## 2021-05-26 NOTE — PATIENT INSTRUCTIONS
Calluses of the Foot    I.  Calluses on the toes   A.  Tips of the toes    1.  Due to pressure on the tips of the toes when wearing shoes, sandals or barefoot.    2.  Related to hammertoe deformity of the toes.    3.  Treated with wearing soft cushioned toe caps or shoe liners to better offload the pressure to the tips of the toes    4.  Correcting the deformity of the toe is another option if cushions do not help   B.  Top of sides of the knuckles of the toes    1.  Due to pressure from adjacent toes or shoes on the knuckles of the toes.    2.  Can be related to hammertoe deformities    3.  Treated with wearing roomy shoes with soft top-cover material in order not to rub on the skin    4.  Silicone toe caps can also be used to protect rubbing from the knuckles of the adjacent toes.    5.  Correcting the deformity of the toe is another option if offloading measures do not work.  II. Calluses on the bottom of the foot   A.   Pressure points    1.  Caused by direct pressure overlying prominent bones such as metatarsal head on the balls of the foot.    2.  Can be related to either hammertoe deformities or fat pad atrophy    3.  Can be treated with additional cushion utilizing silicone shoe liners to better offload the pressure and supplement for fat pad atrophy.    4.  Surgical correction of hammertoe deformities is another option if offloading cushion treatment does not work.   B.   Shear forces    1.  Caused by sliding forces along the skin on the bottom of the forefoot including the great toe.    2.  This is not due to a rotational force as the foot pushes off against the ground.    3.  Treated with wearing a silicone shoe liner that can move with the skin reducing the amount of shear force causing the callus formation.   C.   IPK lesions or porokeratosis    1.  These are small hard callus lesions that are related to plugged sweat ducts.    2.  These ducts travel through the epidermis and dermis residing in the  subcutaneous tissue    3.  When the ducts get clogged, they can harden up resulting in callused skin around them.      4.  Treatment includes sharp excavation of the hyperkeratotic callus tissue core as far as can be tolerated, preferably without bleeding.    5.   Other treatment options   A.  Application of salicylic acid to soften the hyperkeratotic skin   B.  Cantharone which causes a blister in attempt to pull off the hyperkeratotic skin lesion.    Helpful products:    Soles  Silicone Shoe Inserts    Amazon: https://www.Zolpy/Soles-Silicone-Orthopedic-Comfortable-Hypoallergenic/dp/A13CIOV0TG/ref=sr_1_2?dchild=1&keywords=silicone+sole&qbv=1729702899&sr=8-2    Dr. Alcala's Gels Toe Caps      Sold at [employer] [facility]    Amazon: https://www.Zolpy/Melissa-All-Mini-Size/dp/V30BNUKB10/ref=sr_1_2?crid=[email or web address]&dchild=1&keywords=+annettes+all+gel+toe+cap&wmd=[id number]&sprefix=.+Annette%27s+gels+toe%2Caps%2C171&sr=8-2    SMOKING CESSATION  What's in cigarette smoke? - Cigarette smoke contains over 4,000 chemicals. Nicotine is one of the main ingredients which is an insecticide/herbicide. It is poisonous to our nervous system, increases blood clotting risk, and decreases the body's defenses to fight off infection. Another chemical is Carbon Monoxide is an asphyxiating gas that permanently binds to blood cells and blocks the transport of oxygen. This leads to tissue death and decreases your metabolism. Tar is a chemical that coats your lungs and trachea which impairs new oxygen coming in and carbon dioxide getting out of your body.   How does smoking impact surgery? - Smoking is particularly hazardous with regards to surgery. Surgery puts stress on the body and a smoker's body is already under strain from these chemicals. Putting the two together, especially for an elective surgery, could be a recipe for disaster. Smoking before and after surgery increases your risk of  heart problems, slow wound healing, delayed bone healing, blood clots, wound infection and anesthesia complications.   What are the benefits of quitting? - In 20 minutes your blood pressure will drop back down to normal. In 8 hours the carbon monoxide (a toxic gas) levels in your blood stream will drop by half, and oxygen levels will return to normal. In 48 hours your chance of having a heart attack will have decreased. All nicotine will have left your body. Your sense of taste and smell will return to a normal level. In 72 hours your bronchial tubes will relax, and your energy levels will increase. In 2 weeks your circulation will increase, and it will continue to improve for the next 10 weeks.    Recommendations for elective surgery - Ideally, patients should quit smoking 8 weeks before and at least 2 weeks after elective surgery in order to avoid complications. Simply cutting back on the amount of cigarettes smoked per day does not offer any benefit or decrease the risk of poor wound healing, heart problems, and infection. Smokers should also start taking Vitamin C and B for two weeks before surgery and two weeks after surgery.    Ways to Stop Smokin. Nicotine patches, lozenges, or gum  2. Support Groups  3. Medications (see below)    List of Medications:  1. Varenicline Tartrate (CHANTIX)   2. Bupropion HCL (WELLBUTRIN, ZYBAN) - note: make sure Wellbutrin is for smoking cessation and not other issues   3. Nicotine Patch (NICODERM)   4. Nicotine Inhaler (NICOTROL)   5. Nicotine Gum Nicotine Polacrilex   6. Nicotine Lozenge: Nicotine Polacrilex (COMMIT)   * [employer] offers a smoking support group as well!  Please visit: [email or web address]  If you are interested in these, ask about getting a prescription or talk to your primary care doctor about what may be the best way for you to quit.

## 2021-05-28 ENCOUNTER — OFFICE VISIT (OUTPATIENT)
Dept: OTOLARYNGOLOGY | Facility: CLINIC | Age: 36
End: 2021-05-28
Payer: MEDICARE

## 2021-05-28 VITALS
SYSTOLIC BLOOD PRESSURE: 136 MMHG | TEMPERATURE: 98.9 F | BODY MASS INDEX: 33.66 KG/M2 | WEIGHT: 190 LBS | DIASTOLIC BLOOD PRESSURE: 73 MMHG | HEART RATE: 85 BPM

## 2021-05-28 DIAGNOSIS — H71.13 GRANULOMA OF TYMPANIC MEMBRANE, BILATERAL: ICD-10-CM

## 2021-05-28 DIAGNOSIS — H92.13 OTORRHEA, BILATERAL: ICD-10-CM

## 2021-05-28 DIAGNOSIS — H61.23 BILATERAL IMPACTED CERUMEN: ICD-10-CM

## 2021-05-28 DIAGNOSIS — H69.93 CHRONIC EUSTACHIAN TUBE DYSFUNCTION, BILATERAL: ICD-10-CM

## 2021-05-28 DIAGNOSIS — H73.893 RETRACTED TYMPANIC MEMBRANE, BILATERAL: Primary | ICD-10-CM

## 2021-05-28 DIAGNOSIS — Z71.6 ENCOUNTER FOR TOBACCO USE CESSATION COUNSELING: ICD-10-CM

## 2021-05-28 DIAGNOSIS — F17.200 TOBACCO DEPENDENCE SYNDROME: ICD-10-CM

## 2021-05-28 PROCEDURE — 69210 REMOVE IMPACTED EAR WAX UNI: CPT | Performed by: OTOLARYNGOLOGY

## 2021-05-28 PROCEDURE — 99213 OFFICE O/P EST LOW 20 MIN: CPT | Mod: 25 | Performed by: OTOLARYNGOLOGY

## 2021-05-28 RX ORDER — CIPROFLOXACIN AND DEXAMETHASONE 3; 1 MG/ML; MG/ML
SUSPENSION/ DROPS AURICULAR (OTIC)
Qty: 10 ML | Refills: 3 | Status: SHIPPED | OUTPATIENT
Start: 2021-05-28 | End: 2022-01-12

## 2021-05-28 ASSESSMENT — PAIN SCALES - GENERAL: PAINLEVEL: MODERATE PAIN (4)

## 2021-05-28 NOTE — LETTER
5/28/2021         RE: Divina Delgadillo  [address]  [place] [address]        Dear Colleague,    Thank you for referring your patient, Divina Delgadillo, to the [facility]. Please see a copy of my visit note below.    Chief Complaint   Patient presents with     Follow Up     go over hearing test results done on 5/12/21     History of Present Illness  Divina Delgadillo is a 36 year old female who presents to today for ear evaluation. The patient reports some decreased hearing in both ears, intermittent dull otalgia.  She does have some mild foul-smelling otorrhea more so on the right-hand side.  No bloody otorrhea.  She does intermittently have some problems of dizziness and imbalance usually during the day.  She has had ear tubes as a child but no other recent ear surgery.  No significant noise exposure history, heavy machinery, farm equipment, firearm use.  She does have intermittent tinnitus.    The patient underwent an audiogram performed 5/12/2021.  My review of the audiogram showed mild sloping to moderate mixed hearing loss in both ears.  Pure-tone average was 26 dB on the right and 28 dB on the left.  Speech reception threshhold is 25 dB on the right and 25 dB on the left.  The patient had 92% word recognition on the right and 92% word recognition on the left.  The patient had a type A deep tympanogram on the right and a type A shallow tympanogram on the left.  This audiogram was much better than her audiogram from 2018 when she had significantly flat temps and more of a conductive hearing loss.    Past Medical History  Patient Active Problem List   Diagnosis     Esophageal reflux     NAFL (nonalcoholic fatty liver)     Essential hypertension     Hyperlipidemia LDL goal <100     Stage 3 chronic kidney disease     Mucinous neoplasm of pancreas     Type 2 diabetes mellitus with stage 3 chronic kidney disease, with long-term current use of insulin (H)     Bipolar  disorder (H)     Cervical high risk HPV (human papillomavirus) test positive     IUD (intrauterine device) in place     Morbid obesity (H)     Mild intermittent asthma with acute exacerbation     Nicotine abuse     Chronic leukocytosis     Acquired asplenia     Borderline personality disorder (H)     Asthma     PTSD (post-traumatic stress disorder)     Long term (current) use of anticoagulants     Orthostatic hypotension     Tobacco abuse     Vitamin D insufficiency     Depressive disorder     Schizoaffective disorder, depressive type (H)     Uncontrolled type 2 diabetes mellitus with diabetic polyneuropathy, with long-term current use of insulin (H)     Cardiac murmur     Type 2 diabetes mellitus (H)     History of DVT of arm  (deep vein thrombosis)     Current Medications     Current Outpatient Medications:      amLODIPine (NORVASC) 10 MG tablet, TAKE ONE TABLET BY MOUTH DAILY, Disp: 30 tablet, Rfl: 2     ARIPiprazole (ABILIFY) 30 MG tablet, Take 30 mg by mouth daily, Disp: , Rfl:      atorvastatin (LIPITOR) 10 MG tablet, TAKE ONE TABLET BY MOUTH DAILY, Disp: 30 tablet, Rfl: 5     BIOTIN FORTE 5 MG TABS, TAKE ONE TABLET BY MOUTH DAILY, Disp: 30 tablet, Rfl: 3     carvedilol (COREG) 6.25 MG tablet, TAKE ONE TABLET BY MOUTH TWICE DAILY, Disp: 60 tablet, Rfl: 2     ciprofloxacin-dexamethasone (CIPRODEX) 0.3-0.1 % otic suspension, Place 5 drops in both ears twice daily for 14 days., Disp: 10 mL, Rfl: 3     cloZAPine (CLOZARIL) 100 MG tablet, Take 100 mg by mouth 2 times daily , Disp: , Rfl:      FLUoxetine (PROZAC) 10 MG capsule, Take 1 capsule (10 mg) by mouth daily Plus 20 mg capsule, total 30 mg daily, Disp: 30 capsule, Rfl: 2     FLUoxetine (PROZAC) 20 MG capsule, Take 1 capsule (20 mg) by mouth daily, Disp: 30 capsule, Rfl: 2     gabapentin (NEURONTIN) 300 MG capsule, Take 1 capsule (300 mg) by mouth At Bedtime, Disp: 30 capsule, Rfl: 3     insulin glargine (BASAGLAR KWIKPEN) 100 UNIT/ML pen, INJECT 25 UNITS SUB  Q DAILY, Disp: 9 mL, Rfl: 10     lamoTRIgine (LAMICTAL) 100 MG tablet, Take 100 mg by mouth At Bedtime, Disp: , Rfl:      lisinopril (ZESTRIL) 5 MG tablet, TAKE ONE TABLET BY MOUTH DAILY, Disp: 30 tablet, Rfl: 3     loratadine (CLARITIN) 10 MG tablet, TAKE ONE TABLET BY MOUTH EVERY MORNING, Disp: 28 tablet, Rfl: 10     montelukast (SINGULAIR) 10 MG tablet, TAKE ONE TABLET BY MOUTH AT BEDTIME, Disp: 28 tablet, Rfl: 5     NOVOLOG FLEXPEN 100 UNIT/ML soln, INJECT 1 TO 5 UNITS SUB Q THREE TIMES DAILY WITH MEALS PER SLIDING SCALE, Disp: 9 mL, Rfl: 10     omeprazole (PRILOSEC) 20 MG DR capsule, TAKE ONE  CAPSULE BY MOUTH DAILY, Disp: 28 capsule, Rfl: 10     OZEMPIC, 1 MG/DOSE, 2 MG/1.5ML pen, INJECT 1MG SUB-Q EVERY 7 DAYS (ON THURSDAYS), Disp: 3 mL, Rfl: 5     traZODone (DESYREL) 50 MG tablet, Take 50 mg by mouth, Disp: , Rfl:      VENTOLIN  (90 Base) MCG/ACT inhaler, INHALE 2 PUFFS INTO THE LUNGS EVERY SIX  HOURS AS NEEDED FOR SHORTNESS OF BREATH, Disp: 18 g, Rfl: 10     blood glucose (ACCU-CHEK GUIDE) test strip, TEST BLOOD SUGAR THREE TIMES DAILY, Disp: 100 strip, Rfl: 1     blood glucose (ACCU-CHEK GUIDE) test strip, Use to test blood sugar 3 times daily (accu chek GUIDE)., Disp: 150 strip, Rfl: 1     blood glucose monitoring (ACCU-CHEK FASTCLIX) lancets, Use to test blood sugar 3 times daily, Disp: 102 each, Rfl: 3     ULTICARE MINI 31G X 6 MM insulin pen needle, USE 1 PEN NEEDLE DAILY, Disp: 100 each, Rfl: 10    Allergies  Allergies   Allergen Reactions     Acetaminophen Other (See Comments)     pt previously tried to overdose on it, PMD does not want pt taking per pt.      Latex Rash       Social History   Social History     Socioeconomic History     Marital status: Single     Spouse name: Not on file     Number of children: 0     Years of education: Not on file     Highest education level: Not on file   Occupational History     Not on file   Social Needs     Financial resource strain: Not on file     Food  insecurity     Worry: Not on file     Inability: Not on file     Transportation needs     Medical: Not on file     Non-medical: Not on file   Tobacco Use     Smoking status: Current Every Day Smoker     Packs/day: 1.00     Years: 4.00     Pack years: 4.00     Types: Cigarettes     Last attempt to quit: 2019     Years since quittin.9     Smokeless tobacco: Never Used     Tobacco comment: 15 cigarettes a day    Substance and Sexual Activity     Alcohol use: No     Drug use: No     Sexual activity: Not Currently     Partners: Female     Birth control/protection: I.U.D.     Comment: Both male and female    Lifestyle     Physical activity     Days per week: Not on file     Minutes per session: Not on file     Stress: Not on file   Relationships     Social connections     Talks on phone: Not on file     Gets together: Not on file     Attends [employer] service: Not on file     Active member of club or organization: Not on file     Attends meetings of clubs or organizations: Not on file     Relationship status: Not on file     Intimate partner violence     Fear of current or ex partner: Not on file     Emotionally abused: Not on file     Physically abused: Not on file     Forced sexual activity: Not on file   Other Topics Concern     Parent/sibling w/ CABG, MI or angioplasty before 65F 55M? No   Social History Narrative     Not on file       Family History  Family History   Problem Relation Age of Onset     Respiratory Mother         ASTHMA     Allergies Mother      Depression Mother      Heart Disease Father         HEART VALVE REPLACEMENT     Hypertension Father      Diabetes Father      Depression Father      Respiratory Brother      Allergies Brother      Respiratory Sister      Allergies Sister      Depression Sister      Respiratory Sister      Allergies Sister      Depression Sister      Kidney Disease No family hx of        Review of Systems  As per HPI and PMHx, otherwise 10+ comprehensive system review is  negative.    Physical Exam  /73 (BP Location: Right arm, Patient Position: Chair, Cuff Size: Adult Regular)   Pulse 85   Temp 98.9  F (37.2  C) (Tympanic)   Wt 86.2 kg (190 lb)   BMI 33.66 kg/m    GENERAL: Patient is a pleasant, cooperative 36 year old female in no acute distress.  HEAD: Normocephalic, atraumatic.  Hair and scalp are normal.  EYES: Pupils are equal, round, reactive to light and accommodation.  Extraocular movements are intact.  The sclera nonicteric without injection.  The extraocular structures are normal.  EARS: See below.   NEUROLOGIC: Cranial nerves II through XII are grossly intact.  Voice is strong.  Facial nerve examination incomplete due to the patient wearing a mask.  CARDIOVASCULAR: Extremities are warm and well-perfused.  No significant peripheral edema.  RESPIRATORY: Patient has nonlabored breathing without cough, wheeze, stridor.  PSYCHIATRIC: Patient is alert and oriented.  Mood and affect appear normal.  SKIN: Warm and dry.  No scalp, face, or neck lesions noted.    Physical Exam and Procedure    EARS: Normal shape and symmetry.  No tenderness when palpating the mastoid or tragal areas bilaterally.  The ears were then examined under the otic binocular microscope to perform cerumen removal.  Otoscopic exam on the right reveals moist ceruminous debris and otorrhea impacted down to the level of the tympanic membrane.  There is granulation of the tympanic membrane posteriorly at the margin of the annulus.  The ear was debrided with a #3 and #5 suction.  Some crusting was removed with an alligator forceps. The right tympanic membrane is intact with severe retraction onto the middle ear structures.  There are no retraction pockets or evidence of cholesteatoma.      Attention was turned to the left ear.  Otoscopic exam on the left reveals impacted moist ceruminous debris and otorrhea down to the level of the tympanic membrane.  There is granulation at the posterior margin of the  annulus.  There is debrided with a #3 #5 suction.  Some crusting was removed with an alligator forceps.  The left tympanic membrane is intact with severe retraction anteriorly onto the middle ear structures.  There are no retraction pockets or evidence of cholesteatoma.    Assessment and Plan     ICD-10-CM    1. Retracted tympanic membrane, bilateral  H73.893 Remove Cerumen     ciprofloxacin-dexamethasone (CIPRODEX) 0.3-0.1 % otic suspension   2. Chronic Eustachian tube dysfunction, bilateral  H69.83 Remove Cerumen     ciprofloxacin-dexamethasone (CIPRODEX) 0.3-0.1 % otic suspension   3. Granuloma of tympanic membrane, bilateral  H71.13 Remove Cerumen     ciprofloxacin-dexamethasone (CIPRODEX) 0.3-0.1 % otic suspension   4. Otorrhea, bilateral  H92.13 Remove Cerumen     ciprofloxacin-dexamethasone (CIPRODEX) 0.3-0.1 % otic suspension   5. Bilateral impacted cerumen  H61.23    6. Tobacco dependence syndrome  F17.200    7. Encounter for tobacco use cessation counseling  Z71.6      It was my pleasure seeing Divina Delgadillo today in clinic.  Patient has obvious bilateral chronic middle ear disease.  She has evidence of otitis externa and otorrhea bilaterally.  She has tympanic membrane granulation bilaterally.  I think we will treat her with Ciprodex drops 5 drops to both ears twice a day for 2 weeks.  I will have her come back and see me 1 to 2 weeks after finishing the drops.  She might be a good candidate for ear tube placement to help her severe retraction and her symptoms.  Left to see how much she improves after drop treatment.  We discussed placing ear tubes awake or in the operating room.  The patient will think about her options.  The patient knows to contact me if she is having any problems or concerns.  We discussed water precautions for the ear and not placing anything in the ear except for eardrops.    Migue Antunez MD  Department of Otolarygology-Head and Neck Surgery  [facility] [place]         Again,  thank you for allowing me to participate in the care of your patient.        Sincerely,        Migue Antunez MD

## 2021-05-28 NOTE — NURSING NOTE
"Initial /73 (BP Location: Right arm, Patient Position: Chair, Cuff Size: Adult Regular)   Pulse 85   Temp 98.9  F (37.2  C) (Tympanic)   Wt 86.2 kg (190 lb)   BMI 33.66 kg/m   Estimated body mass index is 33.66 kg/m  as calculated from the following:    Height as of 5/26/21: 1.6 m (5' 3\").    Weight as of this encounter: 86.2 kg (190 lb). .    June Burroughs LPN    "

## 2021-06-11 DIAGNOSIS — E78.5 HYPERLIPIDEMIA LDL GOAL <100: ICD-10-CM

## 2021-06-11 RX ORDER — ATORVASTATIN CALCIUM 10 MG/1
TABLET, FILM COATED ORAL
Qty: 28 TABLET | Refills: 10 | Status: SHIPPED | OUTPATIENT
Start: 2021-06-11 | End: 2022-05-09

## 2021-06-11 NOTE — PROGRESS NOTES
Chief Complaint   Patient presents with     Ent Problem     follow up after medication - checking ears     History of Present Illness  Divina Delgadillo is a 36 year old female who presents today for follow-up.  I last evaluated the patient on 5/28/2021.  The patient had bilateral chronic middle ear disease, evidence of otorrhea and otitis externa bilaterally.  She also had some tympanic membrane granulation bilaterally.  I placed her on Ciprodex drops.  We had discussed considering ear tube placement to help her severe tympanic membrane retraction.  I wanted to see how her ears looked after drop treatment.  She returns today for follow-up.     Since last seeing the patient, the patient reports significant improvement in the right ear.  She still having some ear fullness on the left.  She is no longer having any ear pain or drainage.  She does intermittently have some problems of dizziness and imbalance usually during the day.  She has had ear tubes as a child but no other recent ear surgery.  No significant noise exposure history, heavy machinery, farm equipment, firearm use.  She does have intermittent tinnitus.     I did review her temporal bone CT from 11/26/2018.  There was a small soft tissue density in right Prussic's space with some significant retraction of the right tympanic membrane.  There did not really appear to be any significant erosion of the scutum.  There is opacification of the right mastoid air cells.  On the left, the tympanic membrane was severely retracted.  There is no middle ear opacification, opacification of thoracic space, or scutal erosion.  The mastoid was under aerated on the left.    The patient underwent an audiogram performed 5/12/2021.  My review of the audiogram showed mild sloping to moderate mixed hearing loss in both ears.  Pure-tone average was 26 dB on the right and 28 dB on the left.  Speech reception threshhold is 25 dB on the right and 25 dB on the left.  The patient had  92% word recognition on the right and 92% word recognition on the left.  The patient had a type A deep tympanogram on the right and a type A shallow tympanogram on the left.  This audiogram was much better than her audiogram from 2018 when she had significantly flat temps and more of a conductive hearing loss.    Past Medical History  Patient Active Problem List   Diagnosis     Esophageal reflux     NAFL (nonalcoholic fatty liver)     Essential hypertension     Hyperlipidemia LDL goal <100     Stage 3 chronic kidney disease     Mucinous neoplasm of pancreas     Type 2 diabetes mellitus with stage 3 chronic kidney disease, with long-term current use of insulin (H)     Bipolar disorder (H)     Cervical high risk HPV (human papillomavirus) test positive     IUD (intrauterine device) in place     Morbid obesity (H)     Mild intermittent asthma with acute exacerbation     Nicotine abuse     Chronic leukocytosis     Acquired asplenia     Borderline personality disorder (H)     Asthma     PTSD (post-traumatic stress disorder)     Long term (current) use of anticoagulants     Orthostatic hypotension     Tobacco abuse     Vitamin D insufficiency     Depressive disorder     Schizoaffective disorder, depressive type (H)     Uncontrolled type 2 diabetes mellitus with diabetic polyneuropathy, with long-term current use of insulin (H)     Cardiac murmur     Type 2 diabetes mellitus (H)     History of DVT of arm  (deep vein thrombosis)     Current Medications    Current Outpatient Medications:      prednisoLONE acetate (PRED FORTE) 1 % ophthalmic suspension, Place 5 drops Into the left ear 2 times daily for 21 days, Disp: 10 mL, Rfl: 1     amLODIPine (NORVASC) 10 MG tablet, TAKE ONE TABLET BY MOUTH DAILY, Disp: 30 tablet, Rfl: 2     ARIPiprazole (ABILIFY) 30 MG tablet, Take 30 mg by mouth daily, Disp: , Rfl:      atorvastatin (LIPITOR) 10 MG tablet, TAKE ONE TABLET BY MOUTH DAILY, Disp: 28 tablet, Rfl: 10     BIOTIN FORTE 5 MG  TABS, TAKE ONE TABLET BY MOUTH DAILY, Disp: 30 tablet, Rfl: 3     blood glucose (ACCU-CHEK GUIDE) test strip, TEST BLOOD SUGAR THREE TIMES DAILY, Disp: 100 strip, Rfl: 1     blood glucose (ACCU-CHEK GUIDE) test strip, Use to test blood sugar 3 times daily (accu chek GUIDE)., Disp: 150 strip, Rfl: 1     blood glucose monitoring (ACCU-CHEK FASTCLIX) lancets, Use to test blood sugar 3 times daily, Disp: 102 each, Rfl: 3     carvedilol (COREG) 6.25 MG tablet, TAKE ONE TABLET BY MOUTH TWICE DAILY, Disp: 60 tablet, Rfl: 2     ciprofloxacin-dexamethasone (CIPRODEX) 0.3-0.1 % otic suspension, Place 5 drops in both ears twice daily for 14 days., Disp: 10 mL, Rfl: 3     cloZAPine (CLOZARIL) 100 MG tablet, Take 100 mg by mouth 2 times daily , Disp: , Rfl:      FLUoxetine (PROZAC) 10 MG capsule, Take 1 capsule (10 mg) by mouth daily Plus 20 mg capsule, total 30 mg daily, Disp: 30 capsule, Rfl: 2     FLUoxetine (PROZAC) 20 MG capsule, Take 1 capsule (20 mg) by mouth daily, Disp: 30 capsule, Rfl: 2     gabapentin (NEURONTIN) 300 MG capsule, Take 1 capsule (300 mg) by mouth At Bedtime, Disp: 30 capsule, Rfl: 3     insulin glargine (BASAGLAR KWIKPEN) 100 UNIT/ML pen, INJECT 25 UNITS SUB Q DAILY, Disp: 9 mL, Rfl: 10     lamoTRIgine (LAMICTAL) 100 MG tablet, Take 100 mg by mouth At Bedtime, Disp: , Rfl:      lisinopril (ZESTRIL) 5 MG tablet, TAKE ONE TABLET BY MOUTH DAILY, Disp: 30 tablet, Rfl: 3     loratadine (CLARITIN) 10 MG tablet, TAKE ONE TABLET BY MOUTH EVERY MORNING, Disp: 28 tablet, Rfl: 10     montelukast (SINGULAIR) 10 MG tablet, TAKE ONE TABLET BY MOUTH AT BEDTIME, Disp: 28 tablet, Rfl: 5     NOVOLOG FLEXPEN 100 UNIT/ML soln, INJECT 1 TO 5 UNITS SUB Q THREE TIMES DAILY WITH MEALS PER SLIDING SCALE, Disp: 9 mL, Rfl: 10     omeprazole (PRILOSEC) 20 MG DR capsule, TAKE ONE  CAPSULE BY MOUTH DAILY, Disp: 28 capsule, Rfl: 10     OZEMPIC, 1 MG/DOSE, 2 MG/1.5ML pen, INJECT 1MG SUB-Q EVERY 7 DAYS (ON THURSDAYS), Disp: 3 mL, Rfl:  5     traZODone (DESYREL) 50 MG tablet, Take 50 mg by mouth, Disp: , Rfl:      ULTICARE MINI 31G X 6 MM insulin pen needle, USE 1 PEN NEEDLE DAILY, Disp: 100 each, Rfl: 10     VENTOLIN  (90 Base) MCG/ACT inhaler, INHALE 2 PUFFS INTO THE LUNGS EVERY SIX  HOURS AS NEEDED FOR SHORTNESS OF BREATH, Disp: 18 g, Rfl: 10    Allergies  Allergies   Allergen Reactions     Acetaminophen Other (See Comments)     pt previously tried to overdose on it, PMD does not want pt taking per pt.      Latex Rash       Social History  Social History     Socioeconomic History     Marital status: Single     Spouse name: Not on file     Number of children: 0     Years of education: Not on file     Highest education level: Not on file   Occupational History     Not on file   Social Needs     Financial resource strain: Not on file     Food insecurity     Worry: Not on file     Inability: Not on file     Transportation needs     Medical: Not on file     Non-medical: Not on file   Tobacco Use     Smoking status: Current Every Day Smoker     Packs/day: 1.00     Years: 4.00     Pack years: 4.00     Types: Cigarettes     Last attempt to quit: 2019     Years since quittin.9     Smokeless tobacco: Never Used     Tobacco comment: 15 cigarettes a day    Substance and Sexual Activity     Alcohol use: No     Drug use: No     Sexual activity: Not Currently     Partners: Female     Birth control/protection: I.U.D.     Comment: Both male and female    Lifestyle     Physical activity     Days per week: Not on file     Minutes per session: Not on file     Stress: Not on file   Relationships     Social connections     Talks on phone: Not on file     Gets together: Not on file     Attends Religion service: Not on file     Active member of club or organization: Not on file     Attends meetings of clubs or organizations: Not on file     Relationship status: Not on file     Intimate partner violence     Fear of current or ex partner: Not on file      "Emotionally abused: Not on file     Physically abused: Not on file     Forced sexual activity: Not on file   Other Topics Concern     Parent/sibling w/ CABG, MI or angioplasty before 65F 55M? No   Social History Narrative     Not on file       Family History  Family History   Problem Relation Age of Onset     Respiratory Mother         ASTHMA     Allergies Mother      Depression Mother      Heart Disease Father         HEART VALVE REPLACEMENT     Hypertension Father      Diabetes Father      Depression Father      Respiratory Brother      Allergies Brother      Respiratory Sister      Allergies Sister      Depression Sister      Respiratory Sister      Allergies Sister      Depression Sister      Kidney Disease No family hx of        Review of Systems  As per HPI and PMHx, otherwise 10 system review including the head and neck, constitutional, eyes, respiratory, GI, skin, neurologic, lymphatic, endocrine, and allergy systems is negative.    Physical Exam  /71 (BP Location: Right arm, Patient Position: Sitting, Cuff Size: Adult Large)   Pulse 85   Temp 99  F (37.2  C) (Tympanic)   Ht 1.6 m (5' 3\")   Wt 86.2 kg (190 lb 0.6 oz)   BMI 33.66 kg/m    GENERAL: Patient is a pleasant, cooperative 36 year old female in no acute distress.  HEAD: Normocephalic, atraumatic.  Hair and scalp are normal.  EYES: Pupils are equal, round, reactive to light and accommodation.  Extraocular movements are intact.  The sclera nonicteric without injection.  The extraocular structures are normal.  EARS: See below.   NEUROLOGIC: Cranial nerves II through XII are grossly intact.  Voice is strong.  Facial nerve examination incomplete due to the patient wearing a mask.  CARDIOVASCULAR: Extremities are warm and well-perfused.  No significant peripheral edema.  RESPIRATORY: Patient has nonlabored breathing without cough, wheeze, stridor.  PSYCHIATRIC: Patient is alert and oriented.  Mood and affect appear normal.  SKIN: Warm and dry.  " No scalp, face, or neck lesions noted.     Physical Exam and Procedure     EARS: Normal shape and symmetry.  No tenderness when palpating the mastoid or tragal areas bilaterally.  The ears were then examined under the otic binocular microscope to perform cerumen removal.  Otoscopic exam on the right reveals  clear canal.  The posterior granulation has resolved.  The right tympanic membrane is intact with moderately severe retraction onto the middle ear structures.  There are no retraction pockets or evidence of cholesteatoma.       Attention was turned to the left ear.  Otoscopic exam on the left reveals a clear canal.  There has been some shrinking but not complete resolution of the granulation at the posterior margin of the annulus.  There is debrided with a #3 and #5 suction.  Some crusting was removed with an alligator forceps.  The left tympanic membrane is intact with severe retraction anteriorly onto the middle ear structures.  There are no retraction pockets or evidence of cholesteatoma.    Assessment and Plan     ICD-10-CM    1. Granuloma of tympanic membrane, left  H71.12 prednisoLONE acetate (PRED FORTE) 1 % ophthalmic suspension     Microscopy, Binocular   2. Chronic Eustachian tube dysfunction, bilateral  H69.83 prednisoLONE acetate (PRED FORTE) 1 % ophthalmic suspension     Microscopy, Binocular   3. Retracted tympanic membrane, bilateral  H73.893 prednisoLONE acetate (PRED FORTE) 1 % ophthalmic suspension     Microscopy, Binocular   4. Tobacco dependence syndrome  F17.200 prednisoLONE acetate (PRED FORTE) 1 % ophthalmic suspension     Microscopy, Binocular   5. Encounter for tobacco use cessation counseling  Z71.6 prednisoLONE acetate (PRED FORTE) 1 % ophthalmic suspension     Microscopy, Binocular      It was my pleasure seeing Divina Delgadillo today in clinic.  She has had improvement in both ears but does have persistent granulation on the left-hand side.  I think we can treat her with some  steroid only drops in the left to see if we can resolve the remaining granulation.  We will see her back after 21-day treatment of prednisolone drops to the left ear.  I provided her with a prescription.  If the granulation is persistent when I see her in 1 month with a hearing test, we could consider going to the operating room for exploration, removal, and ear tube placement.  If the granulation resolves, hearing improves, and symptoms resolve, I think that I would recommend observation.    Migue Antunez MD  Department of Otolarygology-Head and Neck Surgery  [facility]

## 2021-06-15 DIAGNOSIS — N18.30 STAGE 3 CHRONIC KIDNEY DISEASE (H): Chronic | ICD-10-CM

## 2021-06-15 LAB
ALBUMIN SERPL-MCNC: 3.3 G/DL (ref 3.4–5)
ANION GAP SERPL CALCULATED.3IONS-SCNC: 5 MMOL/L (ref 3–14)
BUN SERPL-MCNC: 18 MG/DL (ref 7–30)
CALCIUM SERPL-MCNC: 8.6 MG/DL (ref 8.5–10.1)
CHLORIDE SERPL-SCNC: 110 MMOL/L (ref 94–109)
CO2 SERPL-SCNC: 25 MMOL/L (ref 20–32)
CREAT SERPL-MCNC: 1.2 MG/DL (ref 0.52–1.04)
GFR SERPL CREATININE-BSD FRML MDRD: 58 ML/MIN/{1.73_M2}
GLUCOSE SERPL-MCNC: 132 MG/DL (ref 70–99)
HGB BLD-MCNC: 12.2 G/DL (ref 11.7–15.7)
PHOSPHATE SERPL-MCNC: 3.2 MG/DL (ref 2.5–4.5)
POTASSIUM SERPL-SCNC: 4.7 MMOL/L (ref 3.4–5.3)
SODIUM SERPL-SCNC: 140 MMOL/L (ref 133–144)

## 2021-06-15 PROCEDURE — 36415 COLL VENOUS BLD VENIPUNCTURE: CPT | Performed by: INTERNAL MEDICINE

## 2021-06-15 PROCEDURE — 83970 ASSAY OF PARATHORMONE: CPT | Performed by: INTERNAL MEDICINE

## 2021-06-15 PROCEDURE — 85018 HEMOGLOBIN: CPT | Performed by: INTERNAL MEDICINE

## 2021-06-15 PROCEDURE — 80069 RENAL FUNCTION PANEL: CPT | Performed by: INTERNAL MEDICINE

## 2021-06-16 LAB — PTH-INTACT SERPL-MCNC: 60 PG/ML (ref 18–80)

## 2021-06-17 ENCOUNTER — OFFICE VISIT (OUTPATIENT)
Dept: OTOLARYNGOLOGY | Facility: CLINIC | Age: 36
End: 2021-06-17
Payer: MEDICARE

## 2021-06-17 VITALS
DIASTOLIC BLOOD PRESSURE: 71 MMHG | SYSTOLIC BLOOD PRESSURE: 133 MMHG | HEIGHT: 63 IN | HEART RATE: 85 BPM | WEIGHT: 190.04 LBS | TEMPERATURE: 99 F | BODY MASS INDEX: 33.67 KG/M2

## 2021-06-17 DIAGNOSIS — H69.93 CHRONIC EUSTACHIAN TUBE DYSFUNCTION, BILATERAL: ICD-10-CM

## 2021-06-17 DIAGNOSIS — H73.893 RETRACTED TYMPANIC MEMBRANE, BILATERAL: ICD-10-CM

## 2021-06-17 DIAGNOSIS — F17.200 TOBACCO DEPENDENCE SYNDROME: ICD-10-CM

## 2021-06-17 DIAGNOSIS — H71.12 GRANULOMA OF TYMPANIC MEMBRANE, LEFT: Primary | ICD-10-CM

## 2021-06-17 DIAGNOSIS — Z71.6 ENCOUNTER FOR TOBACCO USE CESSATION COUNSELING: ICD-10-CM

## 2021-06-17 PROCEDURE — 92504 EAR MICROSCOPY EXAMINATION: CPT | Performed by: OTOLARYNGOLOGY

## 2021-06-17 PROCEDURE — 99213 OFFICE O/P EST LOW 20 MIN: CPT | Mod: 25 | Performed by: OTOLARYNGOLOGY

## 2021-06-17 RX ORDER — PREDNISOLONE ACETATE 10 MG/ML
5 SUSPENSION/ DROPS OPHTHALMIC 2 TIMES DAILY
Qty: 10 ML | Refills: 1 | Status: SHIPPED | OUTPATIENT
Start: 2021-06-17 | End: 2021-07-08

## 2021-06-17 ASSESSMENT — MIFFLIN-ST. JEOR: SCORE: 1526.13

## 2021-06-17 NOTE — LETTER
6/17/2021         RE: Divina Delgadillo  [address]  [place] [facility] [place] [address]        Dear Colleague,    Thank you for referring your patient, Divina Delgadillo, to the [facility]. Please see a copy of my visit note below.    Chief Complaint   Patient presents with     Ent Problem     follow up after medication - checking ears     History of Present Illness  Divina Delgadillo is a 36 year old female who presents today for follow-up.  I last evaluated the patient on 5/28/2021.  The patient had bilateral chronic middle ear disease, evidence of otorrhea and otitis externa bilaterally.  She also had some tympanic membrane granulation bilaterally.  I placed her on Ciprodex drops.  We had discussed considering ear tube placement to help her severe tympanic membrane retraction.  I wanted to see how her ears looked after drop treatment.  She returns today for follow-up.     Since last seeing the patient, the patient reports significant improvement in the right ear.  She still having some ear fullness on the left.  She is no longer having any ear pain or drainage.  She does intermittently have some problems of dizziness and imbalance usually during the day.  She has had ear tubes as a child but no other recent ear surgery.  No significant noise exposure history, heavy machinery, farm equipment, firearm use.  She does have intermittent tinnitus.     I did review her temporal bone CT from 11/26/2018.  There was a small soft tissue density in right plastic space with some significant retraction of the right tympanic membrane.  There did not really appear to be any significant erosion of the scutum.  There is opacification of the right mastoid air cells.  On the left, the tympanic membrane was severely retracted.  There is no middle ear opacification, opacification of thoracic space, or scutal erosion.  The mastoid was under aerated on the left.    The patient underwent an audiogram performed  5/12/2021.  My review of the audiogram showed mild sloping to moderate mixed hearing loss in both ears.  Pure-tone average was 26 dB on the right and 28 dB on the left.  Speech reception threshhold is 25 dB on the right and 25 dB on the left.  The patient had 92% word recognition on the right and 92% word recognition on the left.  The patient had a type A deep tympanogram on the right and a type A shallow tympanogram on the left.  This audiogram was much better than her audiogram from 2018 when she had significantly flat temps and more of a conductive hearing loss.    Past Medical History  Patient Active Problem List   Diagnosis     Esophageal reflux     NAFL (nonalcoholic fatty liver)     Essential hypertension     Hyperlipidemia LDL goal <100     Stage 3 chronic kidney disease     Mucinous neoplasm of pancreas     Type 2 diabetes mellitus with stage 3 chronic kidney disease, with long-term current use of insulin (H)     Bipolar disorder (H)     Cervical high risk HPV (human papillomavirus) test positive     IUD (intrauterine device) in place     Morbid obesity (H)     Mild intermittent asthma with acute exacerbation     Nicotine abuse     Chronic leukocytosis     Acquired asplenia     Borderline personality disorder (H)     Asthma     PTSD (post-traumatic stress disorder)     Long term (current) use of anticoagulants     Orthostatic hypotension     Tobacco abuse     Vitamin D insufficiency     Depressive disorder     Schizoaffective disorder, depressive type (H)     Uncontrolled type 2 diabetes mellitus with diabetic polyneuropathy, with long-term current use of insulin (H)     Cardiac murmur     Type 2 diabetes mellitus (H)     History of DVT of arm  (deep vein thrombosis)     Current Medications    Current Outpatient Medications:      prednisoLONE acetate (PRED FORTE) 1 % ophthalmic suspension, Place 5 drops Into the left ear 2 times daily for 21 days, Disp: 10 mL, Rfl: 1     amLODIPine (NORVASC) 10 MG tablet,  TAKE ONE TABLET BY MOUTH DAILY, Disp: 30 tablet, Rfl: 2     ARIPiprazole (ABILIFY) 30 MG tablet, Take 30 mg by mouth daily, Disp: , Rfl:      atorvastatin (LIPITOR) 10 MG tablet, TAKE ONE TABLET BY MOUTH DAILY, Disp: 28 tablet, Rfl: 10     BIOTIN FORTE 5 MG TABS, TAKE ONE TABLET BY MOUTH DAILY, Disp: 30 tablet, Rfl: 3     blood glucose (ACCU-CHEK GUIDE) test strip, TEST BLOOD SUGAR THREE TIMES DAILY, Disp: 100 strip, Rfl: 1     blood glucose (ACCU-CHEK GUIDE) test strip, Use to test blood sugar 3 times daily (accu chek GUIDE)., Disp: 150 strip, Rfl: 1     blood glucose monitoring (ACCU-CHEK FASTCLIX) lancets, Use to test blood sugar 3 times daily, Disp: 102 each, Rfl: 3     carvedilol (COREG) 6.25 MG tablet, TAKE ONE TABLET BY MOUTH TWICE DAILY, Disp: 60 tablet, Rfl: 2     ciprofloxacin-dexamethasone (CIPRODEX) 0.3-0.1 % otic suspension, Place 5 drops in both ears twice daily for 14 days., Disp: 10 mL, Rfl: 3     cloZAPine (CLOZARIL) 100 MG tablet, Take 100 mg by mouth 2 times daily , Disp: , Rfl:      FLUoxetine (PROZAC) 10 MG capsule, Take 1 capsule (10 mg) by mouth daily Plus 20 mg capsule, total 30 mg daily, Disp: 30 capsule, Rfl: 2     FLUoxetine (PROZAC) 20 MG capsule, Take 1 capsule (20 mg) by mouth daily, Disp: 30 capsule, Rfl: 2     gabapentin (NEURONTIN) 300 MG capsule, Take 1 capsule (300 mg) by mouth At Bedtime, Disp: 30 capsule, Rfl: 3     insulin glargine (BASAGLAR KWIKPEN) 100 UNIT/ML pen, INJECT 25 UNITS SUB Q DAILY, Disp: 9 mL, Rfl: 10     lamoTRIgine (LAMICTAL) 100 MG tablet, Take 100 mg by mouth At Bedtime, Disp: , Rfl:      lisinopril (ZESTRIL) 5 MG tablet, TAKE ONE TABLET BY MOUTH DAILY, Disp: 30 tablet, Rfl: 3     loratadine (CLARITIN) 10 MG tablet, TAKE ONE TABLET BY MOUTH EVERY MORNING, Disp: 28 tablet, Rfl: 10     montelukast (SINGULAIR) 10 MG tablet, TAKE ONE TABLET BY MOUTH AT BEDTIME, Disp: 28 tablet, Rfl: 5     NOVOLOG FLEXPEN 100 UNIT/ML soln, INJECT 1 TO 5 UNITS SUB Q THREE TIMES DAILY  WITH MEALS PER SLIDING SCALE, Disp: 9 mL, Rfl: 10     omeprazole (PRILOSEC) 20 MG DR capsule, TAKE ONE  CAPSULE BY MOUTH DAILY, Disp: 28 capsule, Rfl: 10     OZEMPIC, 1 MG/DOSE, 2 MG/1.5ML pen, INJECT 1MG SUB-Q EVERY 7 DAYS (ON ), Disp: 3 mL, Rfl: 5     traZODone (DESYREL) 50 MG tablet, Take 50 mg by mouth, Disp: , Rfl:      ULTICARE MINI 31G X 6 MM insulin pen needle, USE 1 PEN NEEDLE DAILY, Disp: 100 each, Rfl: 10     VENTOLIN  (90 Base) MCG/ACT inhaler, INHALE 2 PUFFS INTO THE LUNGS EVERY SIX  HOURS AS NEEDED FOR SHORTNESS OF BREATH, Disp: 18 g, Rfl: 10    Allergies  Allergies   Allergen Reactions     Acetaminophen Other (See Comments)     pt previously tried to overdose on it, PMD does not want pt taking per pt.      Latex Rash       Social History  Social History     Socioeconomic History     Marital status: Single     Spouse name: Not on file     Number of children: 0     Years of education: Not on file     Highest education level: Not on file   Occupational History     Not on file   Social Needs     Financial resource strain: Not on file     Food insecurity     Worry: Not on file     Inability: Not on file     Transportation needs     Medical: Not on file     Non-medical: Not on file   Tobacco Use     Smoking status: Current Every Day Smoker     Packs/day: 1.00     Years: 4.00     Pack years: 4.00     Types: Cigarettes     Last attempt to quit: 2019     Years since quittin.9     Smokeless tobacco: Never Used     Tobacco comment: 15 cigarettes a day    Substance and Sexual Activity     Alcohol use: No     Drug use: No     Sexual activity: Not Currently     Partners: Female     Birth control/protection: I.U.D.     Comment: Both male and female    Lifestyle     Physical activity     Days per week: Not on file     Minutes per session: Not on file     Stress: Not on file   Relationships     Social connections     Talks on phone: Not on file     Gets together: Not on file     Attends  "Orthodox service: Not on file     Active member of club or organization: Not on file     Attends meetings of clubs or organizations: Not on file     Relationship status: Not on file     Intimate partner violence     Fear of current or ex partner: Not on file     Emotionally abused: Not on file     Physically abused: Not on file     Forced sexual activity: Not on file   Other Topics Concern     Parent/sibling w/ CABG, MI or angioplasty before 65F 55M? No   Social History Narrative     Not on file       Family History  Family History   Problem Relation Age of Onset     Respiratory Mother         ASTHMA     Allergies Mother      Depression Mother      Heart Disease Father         HEART VALVE REPLACEMENT     Hypertension Father      Diabetes Father      Depression Father      Respiratory Brother      Allergies Brother      Respiratory Sister      Allergies Sister      Depression Sister      Respiratory Sister      Allergies Sister      Depression Sister      Kidney Disease No family hx of        Review of Systems  As per HPI and PMHx, otherwise 10 system review including the head and neck, constitutional, eyes, respiratory, GI, skin, neurologic, lymphatic, endocrine, and allergy systems is negative.    Physical Exam  /71 (BP Location: Right arm, Patient Position: Sitting, Cuff Size: Adult Large)   Pulse 85   Temp 99  F (37.2  C) (Tympanic)   Ht 1.6 m (5' 3\")   Wt 86.2 kg (190 lb 0.6 oz)   BMI 33.66 kg/m    GENERAL: Patient is a pleasant, cooperative 36 year old female in no acute distress.  HEAD: Normocephalic, atraumatic.  Hair and scalp are normal.  EYES: Pupils are equal, round, reactive to light and accommodation.  Extraocular movements are intact.  The sclera nonicteric without injection.  The extraocular structures are normal.  EARS: See below.   NEUROLOGIC: Cranial nerves II through XII are grossly intact.  Voice is strong.  Facial nerve examination incomplete due to the patient wearing a " mask.  CARDIOVASCULAR: Extremities are warm and well-perfused.  No significant peripheral edema.  RESPIRATORY: Patient has nonlabored breathing without cough, wheeze, stridor.  PSYCHIATRIC: Patient is alert and oriented.  Mood and affect appear normal.  SKIN: Warm and dry.  No scalp, face, or neck lesions noted.     Physical Exam and Procedure     EARS: Normal shape and symmetry.  No tenderness when palpating the mastoid or tragal areas bilaterally.  The ears were then examined under the otic binocular microscope to perform cerumen removal.  Otoscopic exam on the right reveals  clear canal.  The posterior granulation has resolved.  The right tympanic membrane is intact with moderately severe retraction onto the middle ear structures.  There are no retraction pockets or evidence of cholesteatoma.       Attention was turned to the left ear.  Otoscopic exam on the left reveals a clear canal.  There has been some shrinking but not complete resolution of the granulation at the posterior margin of the annulus.  There is debrided with a #3 and #5 suction.  Some crusting was removed with an alligator forceps.  The left tympanic membrane is intact with severe retraction anteriorly onto the middle ear structures.  There are no retraction pockets or evidence of cholesteatoma.    Assessment and Plan     ICD-10-CM    1. Granuloma of tympanic membrane, left  H71.12 prednisoLONE acetate (PRED FORTE) 1 % ophthalmic suspension     Microscopy, Binocular   2. Chronic Eustachian tube dysfunction, bilateral  H69.83 prednisoLONE acetate (PRED FORTE) 1 % ophthalmic suspension     Microscopy, Binocular   3. Retracted tympanic membrane, bilateral  H73.893 prednisoLONE acetate (PRED FORTE) 1 % ophthalmic suspension     Microscopy, Binocular   4. Tobacco dependence syndrome  F17.200 prednisoLONE acetate (PRED FORTE) 1 % ophthalmic suspension     Microscopy, Binocular   5. Encounter for tobacco use cessation counseling  Z71.6 prednisoLONE  acetate (PRED FORTE) 1 % ophthalmic suspension     Microscopy, Binocular      It was my pleasure seeing Divina Delgadillo today in clinic.  She has had improvement in both ears but does have persistent granulation on the left-hand side.  I think we can treat her with some steroid only drops in the left to see if we can resolve the remaining granulation.  We will see her back after 21-day treatment of prednisolone drops to the left ear.  I provided her with a prescription.  If the granulation is persistent when I see her in 1 month with a hearing test, we could consider going to the operating room for exploration, removal, and ear tube placement.  If the granulation resolves, hearing improves, and symptoms resolve, I think that I would recommend observation.    Migue Antunez MD  Department of Otolarygology-Head and Neck Surgery  [facility]         Again, thank you for allowing me to participate in the care of your patient.        Sincerely,        Migue Antunez MD

## 2021-06-17 NOTE — PATIENT INSTRUCTIONS
Drops for 3 weeks, check ears in 1 month with hearing test    IF ear still bothersome/granulation still there, consider ear tubes in both ears/removal granulation under anesthesia

## 2021-06-17 NOTE — NURSING NOTE
"Initial /71 (BP Location: Right arm, Patient Position: Sitting, Cuff Size: Adult Large)   Pulse 85   Temp 99  F (37.2  C) (Tympanic)   Ht 1.6 m (5' 3\")   Wt 86.2 kg (190 lb 0.6 oz)   BMI 33.66 kg/m   Estimated body mass index is 33.66 kg/m  as calculated from the following:    Height as of this encounter: 1.6 m (5' 3\").    Weight as of this encounter: 86.2 kg (190 lb 0.6 oz). .    Shelley Jamil MA    "

## 2021-06-21 ENCOUNTER — VIRTUAL VISIT (OUTPATIENT)
Dept: GASTROENTEROLOGY | Facility: CLINIC | Age: 36
End: 2021-06-21
Attending: PHYSICIAN ASSISTANT
Payer: MEDICARE

## 2021-06-21 DIAGNOSIS — K71.9 DRUG-INDUCED LIVER INJURY: ICD-10-CM

## 2021-06-21 DIAGNOSIS — K83.1 CHOLESTATIC LIVER DISEASE (H): Primary | ICD-10-CM

## 2021-06-21 PROCEDURE — 99442 PR PHYSICIAN TELEPHONE EVALUATION 11-20 MIN: CPT | Mod: 95 | Performed by: PHYSICIAN ASSISTANT

## 2021-06-21 ASSESSMENT — PAIN SCALES - GENERAL: PAINLEVEL: NO PAIN (0)

## 2021-06-21 NOTE — LETTER
6/21/2021         RE: Divina Delgadillo  [address]  [place] [address]        Dear Colleague,    Thank you for referring your patient, Divina Delgadillo, to the [facility] [place]. Please see a copy of my visit note below.    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  Clozaril was in the process of being tapered, but patient had worsening psychiatric symptoms (auditory hallucinations, suicidal ideations) so medication was restarted. No recent hepatic panel since May 2020, unfortunately labs weren't drawn at recent lab draw.     - repeat hepatic panel in the near future   - results will determine need for follow up labs based on results  - Follow-up in clinic in six months or sooner based on above    Sonja Bueno PA-C   [facility] Hepatology clinic    -----------------------------------------------------       HPI:   Divina Delgadillo is a 36 year old female presenting for follow-up.     Liver disease: Ductopenia -   Liver biopsy: Mild ductopenia with no evidence of active parenchymal or active biliary tract disease. No fibrosis.   Risk factors for fatty liver disease:     Patient was last seen by me on 8/6/2020. In the ER for suicidal ideation in Feb 2021. In the ER for self-harm in December 2020.     CLozaril was being tapered last fall as it was thought that could have been the reason for her ductopenia. She had worsening of her mental health (auditory hallucinations, suicidal ideations) and decision was made to go back up on her dose in December.     Appetite stable. Blood glucose levels are improving.    Weight has been pretty seatdy.   Having regular bowel movements.     Has some lower extremity edema. Patient denies jaundice, lower extremity edema, abdominal distension or confusion.  Patient also denies melena, hematochezia or hematemesis.    Patient denies weight loss, fevers, sweats or chills.    Working at [email or web address] for the summer. Works at [email or web address] during the year.     Medical hx Surgical hx   Past Medical History:   Diagnosis Date     Acute pain of left knee 03/22/2019     Acute-on-chronic kidney injury (H) 03/22/2019     ARF (acute renal failure) (H) 03/23/2019     Cervical high risk HPV (human papillomavirus) test positive 06/20/2017     Deep venous thrombosis of arm (H) 1/6/2017     Depressive disorder, not elsewhere classified      DVT (deep venous thrombosis) (H)      Dysmetabolic syndrome X      Esophageal reflux      Mild intermittent asthma      Near syncope 03/22/2019     Other specified types of schizophrenia, unspecified condition      Pancreatic cancer (H)      Suicidal behavior 05/26/2020     Type II or unspecified type diabetes mellitus without mention of complication, not stated as uncontrolled     Past Surgical History:   Procedure Laterality Date     ADRENALECTOMY Left 2015     ENT SURGERY  10/01/2000    Tympanoplasty     IR LIVER BIOPSY PERCUTANEOUS  11/14/2019     PANCREATECTOMY PARTIAL  2015     PERCUTANEOUS BIOPSY LIVER Right 11/14/2019    Procedure: Percutaneous Liver Biopsy;  Surgeon: Sean Guzman PA-C;  Location: [facility] OR     SPLENECTOMY  2015     TONSILLECTOMY  10/01/2000    Tonsillectomy                 Medications:     Current Outpatient Medications   Medication     amLODIPine (NORVASC) 10 MG tablet     ARIPiprazole (ABILIFY) 30 MG tablet     atorvastatin (LIPITOR) 10 MG tablet     BIOTIN FORTE 5 MG TABS     blood glucose (ACCU-CHEK GUIDE) test strip     blood glucose (ACCU-CHEK GUIDE) test strip     blood glucose monitoring (ACCU-CHEK FASTCLIX) lancets     carvedilol (COREG) 6.25 MG  tablet     ciprofloxacin-dexamethasone (CIPRODEX) 0.3-0.1 % otic suspension     cloZAPine (CLOZARIL) 100 MG tablet     FLUoxetine (PROZAC) 10 MG capsule     FLUoxetine (PROZAC) 20 MG capsule     gabapentin (NEURONTIN) 300 MG capsule     insulin glargine (BASAGLAR KWIKPEN) 100 UNIT/ML pen     lamoTRIgine (LAMICTAL) 100 MG tablet     lisinopril (ZESTRIL) 5 MG tablet     loratadine (CLARITIN) 10 MG tablet     montelukast (SINGULAIR) 10 MG tablet     NOVOLOG FLEXPEN 100 UNIT/ML soln     omeprazole (PRILOSEC) 20 MG DR capsule     OZEMPIC, 1 MG/DOSE, 2 MG/1.5ML pen     prednisoLONE acetate (PRED FORTE) 1 % ophthalmic suspension     traZODone (DESYREL) 50 MG tablet     ULTICARE MINI 31G X 6 MM insulin pen needle     VENTOLIN  (90 Base) MCG/ACT inhaler     No current facility-administered medications for this visit.             Allergies:     Allergies   Allergen Reactions     Acetaminophen Other (See Comments)     pt previously tried to overdose on it, PMD does not want pt taking per pt.      Latex Rash            Review of Systems:   10 points ROS was obtained and highlighted in the HPI, otherwise negative.          Physical Exam:     PSYCH: Alert and oriented times 3; coherent speech, normal   rate and volume, able to articulate logical thoughts, able   to abstract reason, no tangential thoughts, no hallucinations   or delusions  Her affect is flat.   RESP: No cough, no audible wheezing, able to talk in full sentences  Remainder of exam unable to be completed due to telephone visits             Data:   Reviewed in person and significant for:    Lab Results   Component Value Date     06/15/2021      Lab Results   Component Value Date    POTASSIUM 4.7 06/15/2021     Lab Results   Component Value Date    CHLORIDE 110 06/15/2021     Lab Results   Component Value Date    CO2 25 06/15/2021     Lab Results   Component Value Date    BUN 18 06/15/2021     Lab Results   Component Value Date    CR 1.20 06/15/2021        Lab Results   Component Value Date    WBC Test canceled - Lab  error 07/09/2020    WBC 14.6 07/09/2020     Lab Results   Component Value Date    HGB 12.2 06/15/2021     Lab Results   Component Value Date    HCT Test canceled - Lab  error 07/09/2020    HCT 37.9 07/09/2020     Lab Results   Component Value Date    MCV Test canceled - Lab  error 07/09/2020    MCV 97 07/09/2020     Lab Results   Component Value Date    PLT Test canceled - Lab  error 07/09/2020     07/09/2020       Lab Results   Component Value Date    AST 38 05/27/2020     Lab Results   Component Value Date    ALT 57 05/27/2020     Lab Results   Component Value Date    BILICONJ 0.0 06/23/2009      Lab Results   Component Value Date    BILITOTAL 0.2 05/27/2020       Lab Results   Component Value Date    ALBUMIN 3.3 06/15/2021     Lab Results   Component Value Date    PROTTOTAL 6.5 05/27/2020      Lab Results   Component Value Date    ALKPHOS 193 05/27/2020       Lab Results   Component Value Date    INR 0.97 02/03/2020     ABDOMINAL ULTRASOUND July 13, 2019 9:25 AM      HISTORY: Look at bile ducts, history of elevated liver function tests  and Alk Phos, risk factors for fatty liver, AMA pending. 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 Fatty liver.     COMPARISON: None.     FINDINGS:    Gallbladder: Contracted. No gallstones demonstrated.     Bile ducts: CHD is normal diameter. No intrahepatic biliary  dilatation.     Liver: Mild increased echogenicity without focal lesion.      Pancreas: Normal     Spleen: Surgically absent.      Right kidney: Normal.      Left kidney: Normal.     Aorta and IVC: Normal.                                                                       IMPRESSION: Mild fatty infiltration of the liver.       Divina is a 36 year old who is being evaluated via a billable telephone visit.      What phone number would you like to  be contacted at?  [phone]  How would you like to obtain your AVS? Mail   Phone call duration: 12 minutes          Again, thank you for allowing me to participate in the care of your patient.        Sincerely,        Sonja Bueno PA-C

## 2021-06-21 NOTE — PROGRESS NOTES
Divina is a 36 year old who is being evaluated via a billable telephone visit.      What phone number would you like to be contacted at?  [phone]  How would you like to obtain your AVS? Mail   Phone call duration: 12 minutes

## 2021-06-21 NOTE — PROGRESS NOTES
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  Clozaril was in the process of being tapered, but patient had worsening psychiatric symptoms (auditory hallucinations, suicidal ideations) so medication was restarted. No recent hepatic panel since May 2020, unfortunately labs weren't drawn at recent lab draw.     - repeat hepatic panel in the near future   - results will determine need for follow up labs based on results  - Follow-up in clinic in six months or sooner based on above    Sonja Bueno PA-C   [facility] Hepatology clinic    -----------------------------------------------------       HPI:   Divina Delgadillo is a 36 year old female presenting for follow-up.     Liver disease: Ductopenia -   Liver biopsy: Mild ductopenia with no evidence of active parenchymal or active biliary tract disease. No fibrosis.   Risk factors for fatty liver disease:     Patient was last seen by me on 8/6/2020. In the ER for suicidal ideation in Feb 2021. In the ER for self-harm in December 2020.     CLozaril was being tapered last fall as it was thought that could have been the reason for her ductopenia. She had worsening of her mental health (auditory hallucinations, suicidal ideations) and decision was made to go back up on her dose in December.     Appetite stable. Blood glucose levels are improving.    Weight has been pretty seatdy.  Having regular bowel movements.     Has some lower extremity edema. Patient denies jaundice, lower extremity edema, abdominal distension or confusion.  Patient also denies melena, hematochezia or hematemesis.    Patient denies weight loss, fevers, sweats or chills.    Working at  [employer] for the summer. Works at [employer] during the year.     Medical hx Surgical hx   Past Medical History:   Diagnosis Date     Acute pain of left knee 03/22/2019     Acute-on-chronic kidney injury (H) 03/22/2019     ARF (acute renal failure) (H) 03/23/2019     Cervical high risk HPV (human papillomavirus) test positive 06/20/2017     Deep venous thrombosis of arm (H) 1/6/2017     Depressive disorder, not elsewhere classified      DVT (deep venous thrombosis) (H)      Dysmetabolic syndrome X      Esophageal reflux      Mild intermittent asthma      Near syncope 03/22/2019     Other specified types of schizophrenia, unspecified condition      Pancreatic cancer (H)      Suicidal behavior 05/26/2020     Type II or unspecified type diabetes mellitus without mention of complication, not stated as uncontrolled     Past Surgical History:   Procedure Laterality Date     ADRENALECTOMY Left 2015     ENT SURGERY  10/01/2000    Tympanoplasty     IR LIVER BIOPSY PERCUTANEOUS  11/14/2019     PANCREATECTOMY PARTIAL  2015     PERCUTANEOUS BIOPSY LIVER Right 11/14/2019    Procedure: Percutaneous Liver Biopsy;  Surgeon: Sean Guzman PA-C;  Location: [facility] OR     SPLENECTOMY  2015     TONSILLECTOMY  10/01/2000    Tonsillectomy                 Medications:     Current Outpatient Medications   Medication     amLODIPine (NORVASC) 10 MG tablet     ARIPiprazole (ABILIFY) 30 MG tablet     atorvastatin (LIPITOR) 10 MG tablet     BIOTIN FORTE 5 MG TABS     blood glucose (ACCU-CHEK GUIDE) test strip     blood glucose (ACCU-CHEK GUIDE) test strip     blood glucose monitoring (ACCU-CHEK FASTCLIX) lancets     carvedilol (COREG) 6.25 MG tablet     ciprofloxacin-dexamethasone (CIPRODEX) 0.3-0.1 % otic suspension     cloZAPine (CLOZARIL) 100 MG tablet     FLUoxetine (PROZAC) 10 MG capsule     FLUoxetine (PROZAC) 20 MG capsule     gabapentin (NEURONTIN) 300 MG capsule     insulin glargine (BASAGLAR KWIKPEN) 100  UNIT/ML pen     lamoTRIgine (LAMICTAL) 100 MG tablet     lisinopril (ZESTRIL) 5 MG tablet     loratadine (CLARITIN) 10 MG tablet     montelukast (SINGULAIR) 10 MG tablet     NOVOLOG FLEXPEN 100 UNIT/ML soln     omeprazole (PRILOSEC) 20 MG DR capsule     OZEMPIC, 1 MG/DOSE, 2 MG/1.5ML pen     prednisoLONE acetate (PRED FORTE) 1 % ophthalmic suspension     traZODone (DESYREL) 50 MG tablet     ULTICARE MINI 31G X 6 MM insulin pen needle     VENTOLIN  (90 Base) MCG/ACT inhaler     No current facility-administered medications for this visit.             Allergies:     Allergies   Allergen Reactions     Acetaminophen Other (See Comments)     pt previously tried to overdose on it, PMD does not want pt taking per pt.      Latex Rash            Review of Systems:   10 points ROS was obtained and highlighted in the HPI, otherwise negative.          Physical Exam:     PSYCH: Alert and oriented times 3; coherent speech, normal   rate and volume, able to articulate logical thoughts, able   to abstract reason, no tangential thoughts, no hallucinations   or delusions  Her affect is flat.   RESP: No cough, no audible wheezing, able to talk in full sentences  Remainder of exam unable to be completed due to telephone visits             Data:   Reviewed in person and significant for:    Lab Results   Component Value Date     06/15/2021      Lab Results   Component Value Date    POTASSIUM 4.7 06/15/2021     Lab Results   Component Value Date    CHLORIDE 110 06/15/2021     Lab Results   Component Value Date    CO2 25 06/15/2021     Lab Results   Component Value Date    BUN 18 06/15/2021     Lab Results   Component Value Date    CR 1.20 06/15/2021       Lab Results   Component Value Date    WBC Test canceled - Lab  error 07/09/2020    WBC 14.6 07/09/2020     Lab Results   Component Value Date    HGB 12.2 06/15/2021     Lab Results   Component Value Date    HCT Test canceled - Lab  error 07/09/2020     HCT 37.9 07/09/2020     Lab Results   Component Value Date    MCV Test canceled - Lab  error 07/09/2020    MCV 97 07/09/2020     Lab Results   Component Value Date    PLT Test canceled - Lab  error 07/09/2020     07/09/2020       Lab Results   Component Value Date    AST 38 05/27/2020     Lab Results   Component Value Date    ALT 57 05/27/2020     Lab Results   Component Value Date    BILICONJ 0.0 06/23/2009      Lab Results   Component Value Date    BILITOTAL 0.2 05/27/2020       Lab Results   Component Value Date    ALBUMIN 3.3 06/15/2021     Lab Results   Component Value Date    PROTTOTAL 6.5 05/27/2020      Lab Results   Component Value Date    ALKPHOS 193 05/27/2020       Lab Results   Component Value Date    INR 0.97 02/03/2020     ABDOMINAL ULTRASOUND July 13, 2019 9:25 AM      HISTORY: Look at bile ducts, history of elevated liver function tests  and Alk Phos, risk factors for fatty liver, AMA pending. 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 Fatty liver.     COMPARISON: None.     FINDINGS:    Gallbladder: Contracted. No gallstones demonstrated.     Bile ducts: CHD is normal diameter. No intrahepatic biliary  dilatation.     Liver: Mild increased echogenicity without focal lesion.      Pancreas: Normal     Spleen: Surgically absent.      Right kidney: Normal.      Left kidney: Normal.     Aorta and IVC: Normal.                                                                       IMPRESSION: Mild fatty infiltration of the liver.

## 2021-06-28 ENCOUNTER — VIRTUAL VISIT (OUTPATIENT)
Dept: EDUCATION SERVICES | Facility: CLINIC | Age: 36
End: 2021-06-28
Payer: MEDICARE

## 2021-06-28 DIAGNOSIS — E11.22 TYPE 2 DIABETES MELLITUS WITH STAGE 3 CHRONIC KIDNEY DISEASE, WITH LONG-TERM CURRENT USE OF INSULIN (H): Primary | ICD-10-CM

## 2021-06-28 DIAGNOSIS — N18.30 TYPE 2 DIABETES MELLITUS WITH STAGE 3 CHRONIC KIDNEY DISEASE, WITH LONG-TERM CURRENT USE OF INSULIN (H): Primary | ICD-10-CM

## 2021-06-28 DIAGNOSIS — Z79.4 TYPE 2 DIABETES MELLITUS WITH STAGE 3 CHRONIC KIDNEY DISEASE, WITH LONG-TERM CURRENT USE OF INSULIN (H): Primary | ICD-10-CM

## 2021-06-28 PROCEDURE — 98967 PH1 ASSMT&MGMT NQHP 11-20: CPT | Mod: 95

## 2021-06-28 NOTE — PROGRESS NOTES
Diabetes Follow-up    Subjective/Objective:  Type of Service: Telephone Visit  How would patient like to obtain AVS? Mail a copy    Diabetes is being managed with   Lifestyle (diet/activity), Diabetes Medications   Diabetes Medication(s)     Insulin       insulin glargine (BASAGLAR KWIKPEN) 100 UNIT/ML pen    INJECT 25 UNITS SUB Q DAILY     NOVOLOG FLEXPEN 100 UNIT/ML soln    INJECT 1 TO 5 UNITS SUB Q THREE TIMES DAILY WITH MEALS PER SLIDING SCALE    Incretin Mimetic Agents (GLP-1 Receptor Agonists)       OZEMPIC, 1 MG/DOSE, 2 MG/1.5ML pen    INJECT 1MG SUB-Q EVERY 7 DAYS (ON THURSDAYS)          BG/Food Log:   Divina will take a picture and mychart in blood sugars   Notes rarely needing the sliding scale.    Lots of blood sugars in target - thinks next A1c should be even lower   Lab Results   Component Value Date    A1C 8.0 03/26/2021    A1C 9.0 08/14/2020    A1C 7.8 05/18/2020    A1C 7.4 02/17/2020    A1C 8.0 01/14/2020       Assessment/Plan/Response:  Moved into a new Group Home [facility] in [place] over the last year and reports things are going very well. Has a new job - active / on feet and walks to work.  Has been on track with taking medications and blood sugars.   Novolog sliding scale was added over the last year, but rarely has to use this (1-2 times weekly).  Food/ carbohydrate intake is going ok  - got full dentures and is used to these now.  Divina feels more independent at the new group home and is learning more about the medications she takes.  No changes at this time, commended Divina on her hard work.  Will review blood sugars / insulin dosing once she is able to send this in.     Dolly Riley RD, LD, [facility]  Diabetes Education  Time spent 13 minutes

## 2021-06-29 ENCOUNTER — TELEPHONE (OUTPATIENT)
Dept: EDUCATION SERVICES | Facility: CLINIC | Age: 36
End: 2021-06-29

## 2021-06-29 NOTE — TELEPHONE ENCOUNTER
Patient's group home nurse, Olivia Serrano (?) called triage line wanting to speak with Katiana regarding yesterday's virtual appointment. She can be reached at [phone]    Thanks!  [facility]

## 2021-07-01 ENCOUNTER — MYC MEDICAL ADVICE (OUTPATIENT)
Dept: FAMILY MEDICINE | Facility: CLINIC | Age: 36
End: 2021-07-01

## 2021-07-01 NOTE — TELEPHONE ENCOUNTER
Invesdor message sent to patient to reply to.     Dolly Riley RD, LD, [facility]  Diabetes Education

## 2021-07-01 NOTE — PATIENT INSTRUCTIONS
Diabetes Education After Visit Summary    Keep up the great work!  You are doing a great job.     New A1c lab draw - trying to get back to the 7s, then working on breaking 7.   Lab Results   Component Value Date    A1C 8.0 03/26/2021    A1C 9.0 08/14/2020    A1C 7.8 05/18/2020    A1C 7.4 02/17/2020    A1C 8.0 01/14/2020     Send in blood sugars via [employer] (take a picture with the [employer] phone kwesi) and send in every few weeks.     If blood sugars are < 70 we can decrease the insulin - I will reach out to Jaleesa and have her fax over insulin change orders if needed.      Always keep a source of glucose on you (Glucose tabs) in case you are running lower.       Keep up with walking / moving, this acts like a natural medication and lower blood sugars     Thanks!  Katiana Riley RD, LD, [facility]  Diabetes Education

## 2021-07-01 NOTE — TELEPHONE ENCOUNTER
Spoke with Nikki 6/29; reviewed AVS and will fax to group home. No questions / concerns.     Dolly Riley RD, LD, [facility]  Diabetes Education

## 2021-07-08 DIAGNOSIS — N18.30 TYPE 2 DIABETES MELLITUS WITH STAGE 3 CHRONIC KIDNEY DISEASE, WITH LONG-TERM CURRENT USE OF INSULIN (H): ICD-10-CM

## 2021-07-08 DIAGNOSIS — E11.22 TYPE 2 DIABETES MELLITUS WITH STAGE 3 CHRONIC KIDNEY DISEASE, WITH LONG-TERM CURRENT USE OF INSULIN (H): ICD-10-CM

## 2021-07-08 DIAGNOSIS — K83.1 CHOLESTATIC LIVER DISEASE (H): ICD-10-CM

## 2021-07-08 DIAGNOSIS — Z79.4 TYPE 2 DIABETES MELLITUS WITH STAGE 3 CHRONIC KIDNEY DISEASE, WITH LONG-TERM CURRENT USE OF INSULIN (H): ICD-10-CM

## 2021-07-08 LAB
ALBUMIN SERPL-MCNC: 3.4 G/DL (ref 3.4–5)
ALP SERPL-CCNC: 216 U/L (ref 40–150)
ALT SERPL W P-5'-P-CCNC: 65 U/L (ref 0–50)
ANION GAP SERPL CALCULATED.3IONS-SCNC: 5 MMOL/L (ref 3–14)
AST SERPL W P-5'-P-CCNC: 38 U/L (ref 0–45)
BILIRUB DIRECT SERPL-MCNC: <0.1 MG/DL (ref 0–0.2)
BILIRUB SERPL-MCNC: 0.3 MG/DL (ref 0.2–1.3)
BUN SERPL-MCNC: 22 MG/DL (ref 7–30)
CALCIUM SERPL-MCNC: 9.2 MG/DL (ref 8.5–10.1)
CHLORIDE SERPL-SCNC: 108 MMOL/L (ref 94–109)
CO2 SERPL-SCNC: 26 MMOL/L (ref 20–32)
CREAT SERPL-MCNC: 1.32 MG/DL (ref 0.52–1.04)
ERYTHROCYTE [DISTWIDTH] IN BLOOD BY AUTOMATED COUNT: 14.3 % (ref 10–15)
GFR SERPL CREATININE-BSD FRML MDRD: 52 ML/MIN/{1.73_M2}
GLUCOSE SERPL-MCNC: 187 MG/DL (ref 70–99)
HBA1C MFR BLD: 7.4 % (ref 0–5.6)
HCT VFR BLD AUTO: 39.8 % (ref 35–47)
HGB BLD-MCNC: 12.9 G/DL (ref 11.7–15.7)
INR PPP: 0.91 (ref 0.86–1.14)
MCH RBC QN AUTO: 30.6 PG (ref 26.5–33)
MCHC RBC AUTO-ENTMCNC: 32.4 G/DL (ref 31.5–36.5)
MCV RBC AUTO: 95 FL (ref 78–100)
PLATELET # BLD AUTO: 396 10E9/L (ref 150–450)
POTASSIUM SERPL-SCNC: 4.4 MMOL/L (ref 3.4–5.3)
PROT SERPL-MCNC: 7.9 G/DL (ref 6.8–8.8)
RBC # BLD AUTO: 4.21 10E12/L (ref 3.8–5.2)
SODIUM SERPL-SCNC: 139 MMOL/L (ref 133–144)
WBC # BLD AUTO: 14.8 10E9/L (ref 4–11)

## 2021-07-08 PROCEDURE — 85027 COMPLETE CBC AUTOMATED: CPT | Performed by: PHYSICIAN ASSISTANT

## 2021-07-08 PROCEDURE — 80048 BASIC METABOLIC PNL TOTAL CA: CPT | Performed by: PHYSICIAN ASSISTANT

## 2021-07-08 PROCEDURE — 80076 HEPATIC FUNCTION PANEL: CPT | Performed by: PHYSICIAN ASSISTANT

## 2021-07-08 PROCEDURE — 85610 PROTHROMBIN TIME: CPT | Performed by: PHYSICIAN ASSISTANT

## 2021-07-08 PROCEDURE — 83036 HEMOGLOBIN GLYCOSYLATED A1C: CPT | Performed by: PHYSICIAN ASSISTANT

## 2021-07-08 PROCEDURE — 36415 COLL VENOUS BLD VENIPUNCTURE: CPT | Performed by: PHYSICIAN ASSISTANT

## 2021-07-12 DIAGNOSIS — K83.1 CHOLESTATIC LIVER DISEASE (H): Primary | ICD-10-CM

## 2021-07-14 DIAGNOSIS — Z79.4 TYPE 2 DIABETES MELLITUS WITH STAGE 3 CHRONIC KIDNEY DISEASE, WITH LONG-TERM CURRENT USE OF INSULIN (H): ICD-10-CM

## 2021-07-14 DIAGNOSIS — N18.30 TYPE 2 DIABETES MELLITUS WITH STAGE 3 CHRONIC KIDNEY DISEASE, WITH LONG-TERM CURRENT USE OF INSULIN (H): ICD-10-CM

## 2021-07-14 DIAGNOSIS — E11.22 TYPE 2 DIABETES MELLITUS WITH STAGE 3 CHRONIC KIDNEY DISEASE, WITH LONG-TERM CURRENT USE OF INSULIN (H): ICD-10-CM

## 2021-07-16 RX ORDER — BLOOD SUGAR DIAGNOSTIC
STRIP MISCELLANEOUS
Qty: 150 STRIP | Refills: 2 | Status: SHIPPED | OUTPATIENT
Start: 2021-07-16 | End: 2022-01-17

## 2021-07-18 ENCOUNTER — NURSE TRIAGE (OUTPATIENT)
Dept: NURSING | Facility: CLINIC | Age: 36
End: 2021-07-18

## 2021-07-19 NOTE — TELEPHONE ENCOUNTER
Diabetic and ankles and feet are swollen, parts of ankles and feet have numbness, bilaterally.    Patient should be seen within 24 hours per guidelines, and patient verbalized understanding.    Amber Houston RN  [facility]        Reason for Disposition    [1] Very swollen joint AND [2] no fever    Additional Information    Negative: Difficulty breathing    Negative: Entire foot is cool or blue in comparison to other side    Negative: Fever    Negative: Patient sounds very sick or weak to the triager    Negative: [1] SEVERE pain (e.g., excruciating, unable to walk) AND [2] not improved after 2 hours of pain medicine    Negative: [1] Can't move swollen joint at all AND [2] no fever    Negative: [1] Redness AND [2] painful when touched AND [3] no fever    Negative: [1] Red area or streak [2] large (> 2 in. or 5 cm)    Negative: [1] Thigh or calf pain AND [2] only 1 side AND [3] present > 1 hour    Negative: [1] Thigh, calf, or ankle swelling AND [2] only 1 side    Negative: [1] Thigh, calf, or ankle swelling AND [2] bilateral AND [3] 1 side is more swollen    Negative: Sounds like a life-threatening emergency to the triager    Negative: SEVERE pain    Negative: Foot is cool or blue in comparison to other side    Negative: [1] Looks infected (e.g., spreading redness, red streak, or pus) AND [2] fever    Negative: Patient sounds very sick or weak to the triager    Negative: Fever > 100.4 F (38.0 C)    Negative: [1] Drainage from foot AND [2] new or increased    Negative: [1] Foul-smelling odor from foot AND [2] new or increased    Negative: [1] Spreading redness or red streak AND [2] area > 2 in. (5 cm)    Negative: [1] Purple or black skin on foot or toe AND [2] new or increased    Negative: Looks like a boil or deep ulcer    Negative: Blood glucose > 400 mg/dl (22 mmol/l)    Protocols used: ANKLE SWELLING-A-, DIABETES - FOOT PROBLEMS AND [id number]

## 2021-07-22 DIAGNOSIS — E11.42 DIABETIC POLYNEUROPATHY ASSOCIATED WITH TYPE 2 DIABETES MELLITUS (H): ICD-10-CM

## 2021-07-22 RX ORDER — GABAPENTIN 300 MG/1
300 CAPSULE ORAL AT BEDTIME
Qty: 30 CAPSULE | Refills: 3 | Status: SHIPPED | OUTPATIENT
Start: 2021-07-22 | End: 2021-10-28

## 2021-07-22 RX ORDER — GABAPENTIN 300 MG/1
CAPSULE ORAL
Qty: 28 CAPSULE | Refills: 10 | OUTPATIENT
Start: 2021-07-22

## 2021-07-23 ENCOUNTER — OFFICE VISIT (OUTPATIENT)
Dept: OTOLARYNGOLOGY | Facility: CLINIC | Age: 36
End: 2021-07-23
Payer: MEDICARE

## 2021-07-23 VITALS
TEMPERATURE: 99 F | HEART RATE: 86 BPM | SYSTOLIC BLOOD PRESSURE: 130 MMHG | WEIGHT: 190 LBS | DIASTOLIC BLOOD PRESSURE: 58 MMHG | BODY MASS INDEX: 33.66 KG/M2

## 2021-07-23 DIAGNOSIS — Z71.6 ENCOUNTER FOR TOBACCO USE CESSATION COUNSELING: ICD-10-CM

## 2021-07-23 DIAGNOSIS — H71.12 GRANULOMA OF TYMPANIC MEMBRANE, LEFT: Primary | ICD-10-CM

## 2021-07-23 DIAGNOSIS — H73.893 RETRACTED TYMPANIC MEMBRANE, BILATERAL: ICD-10-CM

## 2021-07-23 DIAGNOSIS — L29.9 EAR ITCHING: ICD-10-CM

## 2021-07-23 DIAGNOSIS — F17.200 TOBACCO DEPENDENCE SYNDROME: ICD-10-CM

## 2021-07-23 DIAGNOSIS — H69.93 CHRONIC EUSTACHIAN TUBE DYSFUNCTION, BILATERAL: ICD-10-CM

## 2021-07-23 DIAGNOSIS — E11.42 DIABETIC POLYNEUROPATHY ASSOCIATED WITH TYPE 2 DIABETES MELLITUS (H): ICD-10-CM

## 2021-07-23 PROCEDURE — 99213 OFFICE O/P EST LOW 20 MIN: CPT | Mod: 25 | Performed by: OTOLARYNGOLOGY

## 2021-07-23 PROCEDURE — 92504 EAR MICROSCOPY EXAMINATION: CPT | Performed by: OTOLARYNGOLOGY

## 2021-07-23 RX ORDER — GABAPENTIN 300 MG/1
CAPSULE ORAL
Qty: 28 CAPSULE | Refills: 10 | OUTPATIENT
Start: 2021-07-23

## 2021-07-23 RX ORDER — FLUOCINOLONE ACETONIDE 0.11 MG/ML
4 OIL AURICULAR (OTIC) 2 TIMES DAILY PRN
Qty: 20 ML | Refills: 3 | Status: SHIPPED | OUTPATIENT
Start: 2021-07-23 | End: 2024-04-24

## 2021-07-23 ASSESSMENT — PAIN SCALES - GENERAL: PAINLEVEL: NO PAIN (0)

## 2021-07-23 NOTE — PROGRESS NOTES
Chief Complaint   Patient presents with     Ear Problem     recheck left ear after using drops- patient cancelled hearing test by mistake     History of Present Illness  Divina Delgadillo is a 36 year old female who presents today for follow-up.  I last evaluated the patient on 6/17/2021.  The patient had bilateral chronic middle ear disease, evidence of otorrhea and otitis externa bilaterally.  She also had some tympanic membrane granulation bilaterally.  I  placed her on Ciprodex drops back in May.  When I saw her for follow-up, she still had a bit of granulation in the left ear.  We had discussed considering ear tube placement to help her severe tympanic membrane retraction.  I placed her on a steroid only drops for 3-week course.  I wanted to see how her ears looked after drop treatment.  She returns today for follow-up.      Since last seeing the patient, the patient reports significant improvement in both ear.  She still having some intermittent ear fullness on the left.  She also has some itching of both ears intermittently.  She is no longer having any ear pain or drainage.  She does intermittently have some problems of dizziness and imbalance usually during the day.  She has had ear tubes as a child but no other recent ear surgery.  No significant noise exposure history, heavy machinery, farm equipment, firearm use.  She does have intermittent tinnitus.     I did review her temporal bone CT from 11/26/2018.  There was a small soft tissue density in right Prussic's space with some significant retraction of the right tympanic membrane.  There did not really appear to be any significant erosion of the scutum.  There is opacification of the right mastoid air cells.  On the left, the tympanic membrane was severely retracted.  There is no middle ear opacification, opacification of thoracic space, or scutal erosion.  The mastoid was under aerated on the left.     The patient underwent an audiogram  performed 5/12/2021.  My review of the audiogram showed mild sloping to moderate mixed hearing loss in both ears.  Pure-tone average was 26 dB on the right and 28 dB on the left.  Speech reception threshhold is 25 dB on the right and 25 dB on the left.  The patient had 92% word recognition on the right and 92% word recognition on the left.  The patient had a type A deep tympanogram on the right and a type A shallow tympanogram on the left.  This audiogram was much better than her audiogram from 2018 when she had significantly flat temps and more of a conductive hearing loss.    Past Medical History  Patient Active Problem List   Diagnosis     Esophageal reflux     NAFL (nonalcoholic fatty liver)     Essential hypertension     Hyperlipidemia LDL goal <100     Stage 3 chronic kidney disease     Mucinous neoplasm of pancreas     Type 2 diabetes mellitus with stage 3 chronic kidney disease, with long-term current use of insulin (H)     Bipolar disorder (H)     Cervical high risk HPV (human papillomavirus) test positive     IUD (intrauterine device) in place     Morbid obesity (H)     Mild intermittent asthma with acute exacerbation     Nicotine abuse     Chronic leukocytosis     Acquired asplenia     Borderline personality disorder (H)     Asthma     PTSD (post-traumatic stress disorder)     Long term (current) use of anticoagulants     Orthostatic hypotension     Tobacco abuse     Vitamin D insufficiency     Depressive disorder     Schizoaffective disorder, depressive type (H)     Uncontrolled type 2 diabetes mellitus with diabetic polyneuropathy, with long-term current use of insulin (H)     Cardiac murmur     Type 2 diabetes mellitus (H)     History of DVT of arm  (deep vein thrombosis)     Current Medications    Current Outpatient Medications:      ACCU-CHEK GUIDE test strip, USE TO TEST BLOOD SUGAR 3 TIMES DAILY (ACCU CHEK GUIDE)., Disp: 150 strip, Rfl: 2     amLODIPine (NORVASC) 10 MG tablet, TAKE ONE TABLET BY  MOUTH DAILY, Disp: 30 tablet, Rfl: 2     ARIPiprazole (ABILIFY) 30 MG tablet, Take 30 mg by mouth daily, Disp: , Rfl:      atorvastatin (LIPITOR) 10 MG tablet, TAKE ONE TABLET BY MOUTH DAILY, Disp: 28 tablet, Rfl: 10     carvedilol (COREG) 6.25 MG tablet, TAKE ONE TABLET BY MOUTH TWICE DAILY, Disp: 60 tablet, Rfl: 2     cloZAPine (CLOZARIL) 100 MG tablet, Take 100 mg by mouth 2 times daily , Disp: , Rfl:      fluocinolone acetonide 0.01 % OIL, Place 4 drops in ear(s) 2 times daily as needed (ear itching), Disp: 20 mL, Rfl: 3     FLUoxetine (PROZAC) 10 MG capsule, Take 1 capsule (10 mg) by mouth daily Plus 20 mg capsule, total 30 mg daily, Disp: 30 capsule, Rfl: 2     FLUoxetine (PROZAC) 20 MG capsule, Take 1 capsule (20 mg) by mouth daily, Disp: 30 capsule, Rfl: 2     gabapentin (NEURONTIN) 300 MG capsule, Take 1 capsule (300 mg) by mouth At Bedtime, Disp: 30 capsule, Rfl: 3     insulin glargine (BASAGLAR KWIKPEN) 100 UNIT/ML pen, INJECT 25 UNITS SUB Q DAILY, Disp: 9 mL, Rfl: 10     lamoTRIgine (LAMICTAL) 100 MG tablet, Take 100 mg by mouth At Bedtime, Disp: , Rfl:      lisinopril (ZESTRIL) 5 MG tablet, TAKE ONE TABLET BY MOUTH DAILY, Disp: 30 tablet, Rfl: 3     loratadine (CLARITIN) 10 MG tablet, TAKE ONE TABLET BY MOUTH EVERY MORNING, Disp: 28 tablet, Rfl: 10     montelukast (SINGULAIR) 10 MG tablet, TAKE ONE TABLET BY MOUTH AT BEDTIME, Disp: 28 tablet, Rfl: 5     NOVOLOG FLEXPEN 100 UNIT/ML soln, INJECT 1 TO 5 UNITS SUB Q THREE TIMES DAILY WITH MEALS PER SLIDING SCALE, Disp: 9 mL, Rfl: 10     omeprazole (PRILOSEC) 20 MG DR capsule, TAKE ONE  CAPSULE BY MOUTH DAILY, Disp: 28 capsule, Rfl: 10     OZEMPIC, 1 MG/DOSE, 2 MG/1.5ML pen, INJECT 1MG SUB-Q EVERY 7 DAYS (ON THURSDAYS), Disp: 3 mL, Rfl: 5     traZODone (DESYREL) 50 MG tablet, Take 50 mg by mouth, Disp: , Rfl:      VENTOLIN  (90 Base) MCG/ACT inhaler, INHALE 2 PUFFS INTO THE LUNGS EVERY SIX  HOURS AS NEEDED FOR SHORTNESS OF BREATH, Disp: 18 g, Rfl:  10     BIOTIN FORTE 5 MG TABS, TAKE ONE TABLET BY MOUTH DAILY, Disp: 30 tablet, Rfl: 3     blood glucose (ACCU-CHEK GUIDE) test strip, TEST BLOOD SUGAR THREE TIMES DAILY, Disp: 100 strip, Rfl: 1     blood glucose monitoring (ACCU-CHEK FASTCLIX) lancets, Use to test blood sugar 3 times daily, Disp: 102 each, Rfl: 3     ciprofloxacin-dexamethasone (CIPRODEX) 0.3-0.1 % otic suspension, Place 5 drops in both ears twice daily for 14 days. (Patient not taking: Reported on 2021), Disp: 10 mL, Rfl: 3     ULTICARE MINI 31G X 6 MM insulin pen needle, USE 1 PEN NEEDLE DAILY, Disp: 100 each, Rfl: 10    Allergies  Allergies   Allergen Reactions     Acetaminophen Other (See Comments)     pt previously tried to overdose on it, PMD does not want pt taking per pt.      Latex Rash       Social History  Social History     Socioeconomic History     Marital status: Single     Spouse name: Not on file     Number of children: 0     Years of education: Not on file     Highest education level: Not on file   Occupational History     Not on file   Tobacco Use     Smoking status: Current Every Day Smoker     Packs/day: 1.00     Years: 4.00     Pack years: 4.00     Types: Cigarettes     Last attempt to quit: 2019     Years since quittin.0     Smokeless tobacco: Never Used     Tobacco comment: 15 cigarettes a day    Substance and Sexual Activity     Alcohol use: No     Drug use: No     Sexual activity: Not Currently     Partners: Female     Birth control/protection: I.U.D.     Comment: Both male and female    Other Topics Concern     Parent/sibling w/ CABG, MI or angioplasty before 65F 55M? No   Social History Narrative     Not on file     Social Determinants of Health     Financial Resource Strain:      Difficulty of Paying Living Expenses:    Food Insecurity:      Worried About Running Out of Food in the Last Year:      Ran Out of Food in the Last Year:    Transportation Needs:      Lack of Transportation (Medical):      Lack of  Transportation (Non-Medical):    Physical Activity:      Days of Exercise per Week:      Minutes of Exercise per Session:    Stress:      Feeling of Stress :    Social Connections:      Frequency of Communication with Friends and Family:      Frequency of Social Gatherings with Friends and Family:      Attends Buddhist Services:      Active Member of Clubs or Organizations:      Attends Club or Organization Meetings:      Marital Status:    Intimate Partner Violence:      Fear of Current or Ex-Partner:      Emotionally Abused:      Physically Abused:      Sexually Abused:        Family History  Family History   Problem Relation Age of Onset     Respiratory Mother         ASTHMA     Allergies Mother      Depression Mother      Heart Disease Father         HEART VALVE REPLACEMENT     Hypertension Father      Diabetes Father      Depression Father      Respiratory Brother      Allergies Brother      Respiratory Sister      Allergies Sister      Depression Sister      Respiratory Sister      Allergies Sister      Depression Sister      Kidney Disease No family hx of        Review of Systems  As per HPI and PMHx, otherwise 10 system review including the head and neck, constitutional, eyes, respiratory, GI, skin, neurologic, lymphatic, endocrine, and allergy systems is negative.    Physical Exam  /58 (BP Location: Right arm, Patient Position: Chair, Cuff Size: Adult Regular)   Pulse 86   Temp 99  F (37.2  C) (Tympanic)   Wt 86.2 kg (190 lb)   BMI 33.66 kg/m    GENERAL: Patient is a pleasant, cooperative 36 year old female in no acute distress.  HEAD: Normocephalic, atraumatic.  Hair and scalp are normal.  EYES: Pupils are equal, round, reactive to light and accommodation.  Extraocular movements are intact.  The sclera nonicteric without injection.  The extraocular structures are normal.  EARS: See below.   NEUROLOGIC: Cranial nerves II through XII are grossly intact.  Voice is strong.  Facial nerve examination  incomplete due to the patient wearing a mask.  CARDIOVASCULAR: Extremities are warm and well-perfused.  No significant peripheral edema.  RESPIRATORY: Patient has nonlabored breathing without cough, wheeze, stridor.  PSYCHIATRIC: Patient is alert and oriented.  Mood and affect appear normal.  SKIN: Warm and dry.  No scalp, face, or neck lesions noted.    Physical Exam and Procedure  EARS: Normal shape and symmetry.  No tenderness when palpating the mastoid or tragal areas bilaterally.  The ears were then examined under the otic binocular microscope to perform cerumen removal.  Otoscopic exam on the right reveals  clear canal.  The posterior granulation has resolved.  The right tympanic membrane is intact with moderately severe retraction onto the middle ear structures.  There are no retraction pockets or evidence of cholesteatoma.      Attention was turned to the left ear.  Otoscopic exam on the left reveals a clear canal.  The relation tissue appears to have resolved completely.  There was slight amount of crusting and moisture at the posterior margin of the annulus that I debrided with a #3 and #5 suction.  The left tympanic membrane is intact with retraction anteriorly onto the middle ear structures.  There are no retraction pockets or evidence of cholesteatoma.    Assessment and Plan     ICD-10-CM    1. Granuloma of tympanic membrane, left  H71.12 Microscopy, Binocular     fluocinolone acetonide 0.01 % OIL   2. Ear itching  L29.9 Microscopy, Binocular     fluocinolone acetonide 0.01 % OIL   3. Chronic Eustachian tube dysfunction, bilateral  H69.83 Microscopy, Binocular     fluocinolone acetonide 0.01 % OIL   4. Retracted tympanic membrane, bilateral  H73.893 Microscopy, Binocular     fluocinolone acetonide 0.01 % OIL   5. Tobacco dependence syndrome  F17.200 Microscopy, Binocular     fluocinolone acetonide 0.01 % OIL   6. Encounter for tobacco use cessation counseling  Z71.6 Microscopy, Binocular      fluocinolone acetonide 0.01 % OIL      It was my pleasure seeing Divina Delgadillo today in clinic.  She has had symptom improvement in both ears and her physical exam shows improvement in the granulation.  I do not see any retraction pockets or evidence of cholesteatoma.  She has no evidence of middle ear effusion.  I would recommend avoiding ear tubes at this time.  I sent her a prescription for Dermotic eardrops she can use as needed for itching.  I would like to see her back in 6 months for follow-up with repeat audiogram.  The patient knows to contact me sooner with any problems or concerns.    Migue Antunez MD  Department of Otolarygology-Head and Neck Surgery  [facility]

## 2021-07-23 NOTE — NURSING NOTE
"Initial /58 (BP Location: Right arm, Patient Position: Chair, Cuff Size: Adult Regular)   Pulse 86   Temp 99  F (37.2  C) (Tympanic)   Wt 86.2 kg (190 lb)   BMI 33.66 kg/m   Estimated body mass index is 33.66 kg/m  as calculated from the following:    Height as of 6/17/21: 1.6 m (5' 3\").    Weight as of this encounter: 86.2 kg (190 lb). .    June Burroughs LPN    "

## 2021-07-23 NOTE — LETTER
7/23/2021         RE: Divina Delgadillo  [address]  [facility] [id number]        Dear Colleague,    Thank you for referring your patient, Divina Delgadillo, to the [facility]. Please see a copy of my visit note below.    Chief Complaint   Patient presents with     Ear Problem     recheck left ear after using drops- patient cancelled hearing test by mistake     History of Present Illness  Divina Delgadillo is a 36 year old female who presents today for follow-up.  I last evaluated the patient on 6/17/2021.  The patient had bilateral chronic middle ear disease, evidence of otorrhea and otitis externa bilaterally.  She also had some tympanic membrane granulation bilaterally.  I  placed her on Ciprodex drops back in May.  When I saw her for follow-up, she still had a bit of granulation in the left ear.  We had discussed considering ear tube placement to help her severe tympanic membrane retraction.  I placed her on a steroid only drops for 3-week course.  I wanted to see how her ears looked after drop treatment.  She returns today for follow-up.      Since last seeing the patient, the patient reports significant improvement in both ear.  She still having some intermittent ear fullness on the left.  She also has some itching of both ears intermittently.  She is no longer having any ear pain or drainage.  She does intermittently have some problems of dizziness and imbalance usually during the day.  She has had ear tubes as a child but no other recent ear surgery.  No significant noise exposure history, heavy machinery, farm equipment, firearm use.  She does have intermittent tinnitus.     I did review her temporal bone CT from 11/26/2018.  There was a small soft tissue density in right Prussic's space with some significant retraction of the right tympanic membrane.  There did not really appear to be any significant erosion of the scutum.  There is opacification of the right mastoid  air cells.  On the left, the tympanic membrane was severely retracted.  There is no middle ear opacification, opacification of thoracic space, or scutal erosion.  The mastoid was under aerated on the left.     The patient underwent an audiogram performed 5/12/2021.  My review of the audiogram showed mild sloping to moderate mixed hearing loss in both ears.  Pure-tone average was 26 dB on the right and 28 dB on the left.  Speech reception threshhold is 25 dB on the right and 25 dB on the left.  The patient had 92% word recognition on the right and 92% word recognition on the left.  The patient had a type A deep tympanogram on the right and a type A shallow tympanogram on the left.  This audiogram was much better than her audiogram from 2018 when she had significantly flat temps and more of a conductive hearing loss.    Past Medical History  Patient Active Problem List   Diagnosis     Esophageal reflux     NAFL (nonalcoholic fatty liver)     Essential hypertension     Hyperlipidemia LDL goal <100     Stage 3 chronic kidney disease     Mucinous neoplasm of pancreas     Type 2 diabetes mellitus with stage 3 chronic kidney disease, with long-term current use of insulin (H)     Bipolar disorder (H)     Cervical high risk HPV (human papillomavirus) test positive     IUD (intrauterine device) in place     Morbid obesity (H)     Mild intermittent asthma with acute exacerbation     Nicotine abuse     Chronic leukocytosis     Acquired asplenia     Borderline personality disorder (H)     Asthma     PTSD (post-traumatic stress disorder)     Long term (current) use of anticoagulants     Orthostatic hypotension     Tobacco abuse     Vitamin D insufficiency     Depressive disorder     Schizoaffective disorder, depressive type (H)     Uncontrolled type 2 diabetes mellitus with diabetic polyneuropathy, with long-term current use of insulin (H)     Cardiac murmur     Type 2 diabetes mellitus (H)     History of DVT of arm  (deep vein  thrombosis)     Current Medications    Current Outpatient Medications:      ACCU-CHEK GUIDE test strip, USE TO TEST BLOOD SUGAR 3 TIMES DAILY (ACCU CHEK GUIDE)., Disp: 150 strip, Rfl: 2     amLODIPine (NORVASC) 10 MG tablet, TAKE ONE TABLET BY MOUTH DAILY, Disp: 30 tablet, Rfl: 2     ARIPiprazole (ABILIFY) 30 MG tablet, Take 30 mg by mouth daily, Disp: , Rfl:      atorvastatin (LIPITOR) 10 MG tablet, TAKE ONE TABLET BY MOUTH DAILY, Disp: 28 tablet, Rfl: 10     carvedilol (COREG) 6.25 MG tablet, TAKE ONE TABLET BY MOUTH TWICE DAILY, Disp: 60 tablet, Rfl: 2     cloZAPine (CLOZARIL) 100 MG tablet, Take 100 mg by mouth 2 times daily , Disp: , Rfl:      fluocinolone acetonide 0.01 % OIL, Place 4 drops in ear(s) 2 times daily as needed (ear itching), Disp: 20 mL, Rfl: 3     FLUoxetine (PROZAC) 10 MG capsule, Take 1 capsule (10 mg) by mouth daily Plus 20 mg capsule, total 30 mg daily, Disp: 30 capsule, Rfl: 2     FLUoxetine (PROZAC) 20 MG capsule, Take 1 capsule (20 mg) by mouth daily, Disp: 30 capsule, Rfl: 2     gabapentin (NEURONTIN) 300 MG capsule, Take 1 capsule (300 mg) by mouth At Bedtime, Disp: 30 capsule, Rfl: 3     insulin glargine (BASAGLAR KWIKPEN) 100 UNIT/ML pen, INJECT 25 UNITS SUB Q DAILY, Disp: 9 mL, Rfl: 10     lamoTRIgine (LAMICTAL) 100 MG tablet, Take 100 mg by mouth At Bedtime, Disp: , Rfl:      lisinopril (ZESTRIL) 5 MG tablet, TAKE ONE TABLET BY MOUTH DAILY, Disp: 30 tablet, Rfl: 3     loratadine (CLARITIN) 10 MG tablet, TAKE ONE TABLET BY MOUTH EVERY MORNING, Disp: 28 tablet, Rfl: 10     montelukast (SINGULAIR) 10 MG tablet, TAKE ONE TABLET BY MOUTH AT BEDTIME, Disp: 28 tablet, Rfl: 5     NOVOLOG FLEXPEN 100 UNIT/ML soln, INJECT 1 TO 5 UNITS SUB Q THREE TIMES DAILY WITH MEALS PER SLIDING SCALE, Disp: 9 mL, Rfl: 10     omeprazole (PRILOSEC) 20 MG DR capsule, TAKE ONE  CAPSULE BY MOUTH DAILY, Disp: 28 capsule, Rfl: 10     OZEMPIC, 1 MG/DOSE, 2 MG/1.5ML pen, INJECT 1MG SUB-Q EVERY 7 DAYS (ON  ), Disp: 3 mL, Rfl: 5     traZODone (DESYREL) 50 MG tablet, Take 50 mg by mouth, Disp: , Rfl:      VENTOLIN  (90 Base) MCG/ACT inhaler, INHALE 2 PUFFS INTO THE LUNGS EVERY SIX  HOURS AS NEEDED FOR SHORTNESS OF BREATH, Disp: 18 g, Rfl: 10     BIOTIN FORTE 5 MG TABS, TAKE ONE TABLET BY MOUTH DAILY, Disp: 30 tablet, Rfl: 3     blood glucose (ACCU-CHEK GUIDE) test strip, TEST BLOOD SUGAR THREE TIMES DAILY, Disp: 100 strip, Rfl: 1     blood glucose monitoring (ACCU-CHEK FASTCLIX) lancets, Use to test blood sugar 3 times daily, Disp: 102 each, Rfl: 3     ciprofloxacin-dexamethasone (CIPRODEX) 0.3-0.1 % otic suspension, Place 5 drops in both ears twice daily for 14 days. (Patient not taking: Reported on 2021), Disp: 10 mL, Rfl: 3     ULTICARE MINI 31G X 6 MM insulin pen needle, USE 1 PEN NEEDLE DAILY, Disp: 100 each, Rfl: 10    Allergies  Allergies   Allergen Reactions     Acetaminophen Other (See Comments)     pt previously tried to overdose on it, PMD does not want pt taking per pt.      Latex Rash       Social History  Social History     Socioeconomic History     Marital status: Single     Spouse name: Not on file     Number of children: 0     Years of education: Not on file     Highest education level: Not on file   Occupational History     Not on file   Tobacco Use     Smoking status: Current Every Day Smoker     Packs/day: 1.00     Years: 4.00     Pack years: 4.00     Types: Cigarettes     Last attempt to quit: 2019     Years since quittin.0     Smokeless tobacco: Never Used     Tobacco comment: 15 cigarettes a day    Substance and Sexual Activity     Alcohol use: No     Drug use: No     Sexual activity: Not Currently     Partners: Female     Birth control/protection: I.U.D.     Comment: Both male and female    Other Topics Concern     Parent/sibling w/ CABG, MI or angioplasty before 65F 55M? No   Social History Narrative     Not on file     Social Determinants of Health     Financial  Resource Strain:      Difficulty of Paying Living Expenses:    Food Insecurity:      Worried About Running Out of Food in the Last Year:      Ran Out of Food in the Last Year:    Transportation Needs:      Lack of Transportation (Medical):      Lack of Transportation (Non-Medical):    Physical Activity:      Days of Exercise per Week:      Minutes of Exercise per Session:    Stress:      Feeling of Stress :    Social Connections:      Frequency of Communication with Friends and Family:      Frequency of Social Gatherings with Friends and Family:      Attends Rastafarian Services:      Active Member of Clubs or Organizations:      Attends Club or Organization Meetings:      Marital Status:    Intimate Partner Violence:      Fear of Current or Ex-Partner:      Emotionally Abused:      Physically Abused:      Sexually Abused:        Family History  Family History   Problem Relation Age of Onset     Respiratory Mother         ASTHMA     Allergies Mother      Depression Mother      Heart Disease Father         HEART VALVE REPLACEMENT     Hypertension Father      Diabetes Father      Depression Father      Respiratory Brother      Allergies Brother      Respiratory Sister      Allergies Sister      Depression Sister      Respiratory Sister      Allergies Sister      Depression Sister      Kidney Disease No family hx of        Review of Systems  As per HPI and PMHx, otherwise 10 system review including the head and neck, constitutional, eyes, respiratory, GI, skin, neurologic, lymphatic, endocrine, and allergy systems is negative.    Physical Exam  /58 (BP Location: Right arm, Patient Position: Chair, Cuff Size: Adult Regular)   Pulse 86   Temp 99  F (37.2  C) (Tympanic)   Wt 86.2 kg (190 lb)   BMI 33.66 kg/m    GENERAL: Patient is a pleasant, cooperative 36 year old female in no acute distress.  HEAD: Normocephalic, atraumatic.  Hair and scalp are normal.  EYES: Pupils are equal, round, reactive to light and  accommodation.  Extraocular movements are intact.  The sclera nonicteric without injection.  The extraocular structures are normal.  EARS: See below.   NEUROLOGIC: Cranial nerves II through XII are grossly intact.  Voice is strong.  Facial nerve examination incomplete due to the patient wearing a mask.  CARDIOVASCULAR: Extremities are warm and well-perfused.  No significant peripheral edema.  RESPIRATORY: Patient has nonlabored breathing without cough, wheeze, stridor.  PSYCHIATRIC: Patient is alert and oriented.  Mood and affect appear normal.  SKIN: Warm and dry.  No scalp, face, or neck lesions noted.    Physical Exam and Procedure  EARS: Normal shape and symmetry.  No tenderness when palpating the mastoid or tragal areas bilaterally.  The ears were then examined under the otic binocular microscope to perform cerumen removal.  Otoscopic exam on the right reveals  clear canal.  The posterior granulation has resolved.  The right tympanic membrane is intact with moderately severe retraction onto the middle ear structures.  There are no retraction pockets or evidence of cholesteatoma.      Attention was turned to the left ear.  Otoscopic exam on the left reveals a clear canal.  The relation tissue appears to have resolved completely.  There was slight amount of crusting and moisture at the posterior margin of the annulus that I debrided with a #3 and #5 suction.  The left tympanic membrane is intact with retraction anteriorly onto the middle ear structures.  There are no retraction pockets or evidence of cholesteatoma.    Assessment and Plan     ICD-10-CM    1. Granuloma of tympanic membrane, left  H71.12 Microscopy, Binocular     fluocinolone acetonide 0.01 % OIL   2. Ear itching  L29.9 Microscopy, Binocular     fluocinolone acetonide 0.01 % OIL   3. Chronic Eustachian tube dysfunction, bilateral  H69.83 Microscopy, Binocular     fluocinolone acetonide 0.01 % OIL   4. Retracted tympanic membrane, bilateral  H73.893  Microscopy, Binocular     fluocinolone acetonide 0.01 % OIL   5. Tobacco dependence syndrome  F17.200 Microscopy, Binocular     fluocinolone acetonide 0.01 % OIL   6. Encounter for tobacco use cessation counseling  Z71.6 Microscopy, Binocular     fluocinolone acetonide 0.01 % OIL      It was my pleasure seeing Divina Delgadillo today in clinic.  She has had symptom improvement in both ears and her physical exam shows improvement in the granulation.  I do not see any retraction pockets or evidence of cholesteatoma.  She has no evidence of middle ear effusion.  I would recommend avoiding ear tubes at this time.  I sent her a prescription for Dermotic eardrops she can use as needed for itching.  I would like to see her back in 6 months for follow-up with repeat audiogram.  The patient knows to contact me sooner with any problems or concerns.    Migue Antunez MD  Department of Otolarygology-Head and Neck Surgery  [facility]       Again, thank you for allowing me to participate in the care of your patient.        Sincerely,        Migue Antunez MD

## 2021-07-26 DIAGNOSIS — L65.9 HAIR LOSS: ICD-10-CM

## 2021-07-28 RX ORDER — BIOTIN/FOLIC ACID/VIT BCOMP,C 5MG-0.8MG
TABLET ORAL
Qty: 60 TABLET | Refills: 10 | Status: SHIPPED | OUTPATIENT
Start: 2021-07-28 | End: 2022-10-19

## 2021-08-09 ENCOUNTER — OFFICE VISIT (OUTPATIENT)
Dept: FAMILY MEDICINE | Facility: CLINIC | Age: 36
End: 2021-08-09
Payer: MEDICARE

## 2021-08-09 VITALS
TEMPERATURE: 98.2 F | SYSTOLIC BLOOD PRESSURE: 120 MMHG | BODY MASS INDEX: 34.19 KG/M2 | OXYGEN SATURATION: 97 % | WEIGHT: 193 LBS | HEART RATE: 82 BPM | DIASTOLIC BLOOD PRESSURE: 64 MMHG

## 2021-08-09 DIAGNOSIS — R42 DIZZINESS: Primary | ICD-10-CM

## 2021-08-09 DIAGNOSIS — H81.11 BENIGN PAROXYSMAL POSITIONAL VERTIGO OF RIGHT EAR: ICD-10-CM

## 2021-08-09 DIAGNOSIS — Z71.6 ENCOUNTER FOR SMOKING CESSATION COUNSELING: ICD-10-CM

## 2021-08-09 DIAGNOSIS — I95.1 ORTHOSTATIC HYPOTENSION: ICD-10-CM

## 2021-08-09 LAB
ANION GAP SERPL CALCULATED.3IONS-SCNC: 8 MMOL/L (ref 3–14)
BASOPHILS # BLD AUTO: 0.1 10E3/UL (ref 0–0.2)
BASOPHILS NFR BLD AUTO: 1 %
BUN SERPL-MCNC: 17 MG/DL (ref 7–30)
CALCIUM SERPL-MCNC: 9 MG/DL (ref 8.5–10.1)
CHLORIDE BLD-SCNC: 107 MMOL/L (ref 94–109)
CO2 SERPL-SCNC: 24 MMOL/L (ref 20–32)
CREAT SERPL-MCNC: 1.35 MG/DL (ref 0.52–1.04)
EOSINOPHIL # BLD AUTO: 0.2 10E3/UL (ref 0–0.7)
EOSINOPHIL NFR BLD AUTO: 1 %
ERYTHROCYTE [DISTWIDTH] IN BLOOD BY AUTOMATED COUNT: 14 % (ref 10–15)
GFR SERPL CREATININE-BSD FRML MDRD: 51 ML/MIN/1.73M2
GLUCOSE BLD-MCNC: 91 MG/DL (ref 70–99)
HCT VFR BLD AUTO: 40.1 % (ref 35–47)
HGB BLD-MCNC: 13 G/DL (ref 11.7–15.7)
LYMPHOCYTES # BLD AUTO: 4.2 10E3/UL (ref 0.8–5.3)
LYMPHOCYTES NFR BLD AUTO: 25 %
MCH RBC QN AUTO: 31.1 PG (ref 26.5–33)
MCHC RBC AUTO-ENTMCNC: 32.4 G/DL (ref 31.5–36.5)
MCV RBC AUTO: 96 FL (ref 78–100)
MONOCYTES # BLD AUTO: 1.2 10E3/UL (ref 0–1.3)
MONOCYTES NFR BLD AUTO: 7 %
NEUTROPHILS # BLD AUTO: 11.1 10E3/UL (ref 1.6–8.3)
NEUTROPHILS NFR BLD AUTO: 66 %
PLATELET # BLD AUTO: 427 10E3/UL (ref 150–450)
POTASSIUM BLD-SCNC: 4.5 MMOL/L (ref 3.4–5.3)
RBC # BLD AUTO: 4.18 10E6/UL (ref 3.8–5.2)
SODIUM SERPL-SCNC: 139 MMOL/L (ref 133–144)
WBC # BLD AUTO: 16.8 10E3/UL (ref 4–11)

## 2021-08-09 PROCEDURE — 80048 BASIC METABOLIC PNL TOTAL CA: CPT | Performed by: NURSE PRACTITIONER

## 2021-08-09 PROCEDURE — 85025 COMPLETE CBC W/AUTO DIFF WBC: CPT | Performed by: NURSE PRACTITIONER

## 2021-08-09 PROCEDURE — 99214 OFFICE O/P EST MOD 30 MIN: CPT | Performed by: NURSE PRACTITIONER

## 2021-08-09 PROCEDURE — 36415 COLL VENOUS BLD VENIPUNCTURE: CPT | Performed by: NURSE PRACTITIONER

## 2021-08-09 RX ORDER — NICOTINE 21 MG/24HR
1 PATCH, TRANSDERMAL 24 HOURS TRANSDERMAL EVERY 24 HOURS
Qty: 30 PATCH | Refills: 1 | Status: SHIPPED | OUTPATIENT
Start: 2021-08-09 | End: 2022-01-20

## 2021-08-09 RX ORDER — MECLIZINE HYDROCHLORIDE 25 MG/1
25 TABLET ORAL 3 TIMES DAILY PRN
Qty: 30 TABLET | Refills: 0 | Status: SHIPPED | OUTPATIENT
Start: 2021-08-09 | End: 2022-04-20

## 2021-08-09 NOTE — PATIENT INSTRUCTIONS
INJECT 1 TO 5 UNITS SUB Q THREE TIMES DAILY WITH MEALS PER SLIDING SCALE    Blood Glucose 175-224: 1 unit insulin   Blood Glucose 225-274: 2 units insulin  Blood Glucose 275-324: 3 units insulin  Blood Glucose 325-374: 4 units insulin  Blood Glucose Over 375: 5 units insulin and follow up if numbers high constantly or over 200'     Labs today    Drink more fluids     Try Meclizine 25 mg 3 times daily as needed for dizziness    Schedule balance therapy if all labs normal

## 2021-08-09 NOTE — PROGRESS NOTES
Assessment & Plan     Dizziness  -occasional positional dizziness, due to orthostatic hypotension, labs normal BP sitting 128/76, standing 106/62  -recommended good hydartion, monitor BP at home   - Basic metabolic panel  (Ca, Cl, CO2, Creat, Gluc, K, Na, BUN); Future  - CBC with platelets and differential; Future  - Basic metabolic panel  (Ca, Cl, CO2, Creat, Gluc, K, Na, BUN)  - CBC with platelets and differential    Orthostatic hypotension    - Basic metabolic panel  (Ca, Cl, CO2, Creat, Gluc, K, Na, BUN); Future  - Basic metabolic panel  (Ca, Cl, CO2, Creat, Gluc, K, Na, BUN)    Benign paroxysmal positional vertigo of right ear  -having occasional; vertigo, states symptoms worse when she is turning head to the right  -recommended Meclizine as needed and try vestibular therapy   - meclizine (ANTIVERT) 25 MG tablet; Take 1 tablet (25 mg) by mouth 3 times daily as needed for dizziness  - Physical Therapy Referral; Future    Encounter for smoking cessation counseling    - nicotine (NICODERM CQ) 14 MG/24HR 24 hr patch; Place 1 patch onto the skin every 24 hours        VERONIQUE Spears [facility]    Rhiannon Murcia is a 36 year old who presents for the following health issues    Chief Complaint   Patient presents with     Dizziness     Smoking Cessation     Would like nicotine patches      Recheck Medication     She would like to change her sliding scale to 175 or higher.        HPI     Dizziness  Onset/Duration: one month   Description:   Do you feel faint: YES  Does it feel like the surroundings (bed, room) are moving: YES  Unsteady/off balance: YES  Have you passed out or fallen: no  Intensity: severe  Progression of Symptoms: constant  Accompanying Signs & Symptoms: When the dizziness happens, she does check her blood sugars and they have been normal.   Heart palpitations or chest pain: no  Nausea, vomiting: no  Weakness or lack of coordination in arms or legs: YES  Vision  or speech changes: no  Numbness or tingling: no  Ringing in ears (Tinnitus): no  Hearing Loss: no  History:   Head trauma/concussion history: no  Previous similar symptoms: YES  Recent bleeding history: no  Any new medications (BP?): no  Precipitating factors:   Worse with activity: YES  Worse with head movement: YES  Alleviating factors:   Does staying in a fixed position give relief: no   Therapies tried and outcome: None        Review of Systems   Constitutional, HEENT, cardiovascular, pulmonary, gi and gu systems are negative, except as otherwise noted.      Objective    /64 (BP Location: Right arm, Patient Position: Sitting, Cuff Size: Adult Large)   Pulse 82   Temp 98.2  F (36.8  C) (Tympanic)   Wt 87.5 kg (193 lb)   SpO2 97%   BMI 34.19 kg/m    Body mass index is 34.19 kg/m .  Physical Exam   GENERAL: healthy, alert and no distress  EYES: Eyes grossly normal to inspection, PERRL and conjunctivae and sclerae normal  HENT: ear canals and TM's normal, nose and mouth without ulcers or lesions  NECK: no adenopathy, no asymmetry, masses, or scars and thyroid normal to palpation  RESP: lungs clear to auscultation - no rales, rhonchi or wheezes  CV: regular rate and rhythm, normal S1 S2, no S3 or S4, no murmur, click or rub, no peripheral edema and peripheral pulses strong  MS: no gross musculoskeletal defects noted, no edema  NEURO: Normal strength and tone, mentation intact and speech normal  PSYCH: mentation appears normal, affect normal/bright    Results for orders placed or performed in visit on 08/09/21   Basic metabolic panel  (Ca, Cl, CO2, Creat, Gluc, K, Na, BUN)     Status: Abnormal   Result Value Ref Range    Sodium 139 133 - 144 mmol/L    Potassium 4.5 3.4 - 5.3 mmol/L    Chloride 107 94 - 109 mmol/L    Carbon Dioxide (CO2) 24 20 - 32 mmol/L    Anion Gap 8 3 - 14 mmol/L    Urea Nitrogen 17 7 - 30 mg/dL    Creatinine 1.35 (H) 0.52 - 1.04 mg/dL    Calcium 9.0 8.5 - 10.1 mg/dL    Glucose 91 70 -  99 mg/dL    GFR Estimate 51 (L) >60 mL/min/1.73m2   CBC with platelets and differential     Status: Abnormal    Narrative    The following orders were created for panel order CBC with platelets and differential.  Procedure                               Abnormality         Status                     ---------                               -----------         ------                     CBC with platelets and d...[[id number]]  Abnormal            Final result                 Please view results for these tests on the individual orders.   CBC with platelets and differential     Status: Abnormal   Result Value Ref Range    WBC Count 16.8 (H) 4.0 - 11.0 10e3/uL    RBC Count 4.18 3.80 - 5.20 10e6/uL    Hemoglobin 13.0 11.7 - 15.7 g/dL    Hematocrit 40.1 35.0 - 47.0 %    MCV 96 78 - 100 fL    MCH 31.1 26.5 - 33.0 pg    MCHC 32.4 31.5 - 36.5 g/dL    RDW 14.0 10.0 - 15.0 %    Platelet Count 427 150 - 450 10e3/uL    % Neutrophils 66 %    % Lymphocytes 25 %    % Monocytes 7 %    % Eosinophils 1 %    % Basophils 1 %    Absolute Neutrophils 11.1 (H) 1.6 - 8.3 10e3/uL    Absolute Lymphocytes 4.2 0.8 - 5.3 10e3/uL    Absolute Monocytes 1.2 0.0 - 1.3 10e3/uL    Absolute Eosinophils 0.2 0.0 - 0.7 10e3/uL    Absolute Basophils 0.1 0.0 - 0.2 10e3/uL

## 2021-08-10 ASSESSMENT — ASTHMA QUESTIONNAIRES: ACT_TOTALSCORE: 25

## 2021-08-11 NOTE — TELEPHONE ENCOUNTER
Routing refill request to provider for review/approval because:  Labs out of range:  Creatinine  Bonnie Hawthorne RNC           Time Frame:  2-3   days   Goal:  Modified Independent   with RW x 200  feet / Stairs: pt will navigate 7+7 stairs independently with rails

## 2021-08-12 ENCOUNTER — TELEPHONE (OUTPATIENT)
Dept: FAMILY MEDICINE | Facility: CLINIC | Age: 36
End: 2021-08-12

## 2021-08-12 NOTE — TELEPHONE ENCOUNTER
Do calling from patient group home.  Needs clarification on sliding scale insulin orders. There are numbers missing. Please call her back at [phone]    Fax new sliding scale/signed to [phone]    Copied from AVS 8/9/21:    INJECT 1 TO 5 UNITS SUB Q THREE TIMES DAILY WITH MEALS PER SLIDING SCALE     Blood Glucose 175-199: 1 unit insulin  200-224?  Blood Glucose 225-249: 2 units insulin  250-274?  Blood Glucose 275-299: 3 units insulin  300-324?  Blood Glucose 325-349: 4 units insulin  350-374?  Blood Glucose Over 375: 5 units insulin and follow up if numbers high constantly or over 200'     Devi Purdy RN

## 2021-08-12 NOTE — TELEPHONE ENCOUNTER
Blood Glucose 175-224: 1 unit insulin  Blood Glucose 225-274: 2 units insulin  Blood Glucose 275-324: 3 units insulin  Blood Glucose 325-374: 4 units insulin  Blood Glucose Over 375: 5 units insulin and follow up if numbers high constantly or over 200'     Jaleesa Velasquez, APRN CNP

## 2021-08-13 NOTE — TELEPHONE ENCOUNTER
[facility] - can you print this and have Jaleesa Formogey sign?  They need a signed sliding scale order sent to fax number below.    Devi Purdy RN

## 2021-08-15 ENCOUNTER — MYC MEDICAL ADVICE (OUTPATIENT)
Dept: FAMILY MEDICINE | Facility: CLINIC | Age: 36
End: 2021-08-15

## 2021-08-18 ENCOUNTER — HOSPITAL ENCOUNTER (OUTPATIENT)
Dept: PHYSICAL THERAPY | Facility: CLINIC | Age: 36
Setting detail: THERAPIES SERIES
End: 2021-08-18
Attending: NURSE PRACTITIONER
Payer: MEDICARE

## 2021-08-18 DIAGNOSIS — H81.11 BENIGN PAROXYSMAL POSITIONAL VERTIGO OF RIGHT EAR: ICD-10-CM

## 2021-08-18 PROCEDURE — 97161 PT EVAL LOW COMPLEX 20 MIN: CPT | Mod: GP | Performed by: PHYSICAL THERAPIST

## 2021-08-18 NOTE — PROGRESS NOTES
08/18/21 1400   Quick Adds   Quick Adds Certification;Vestibular Eval   Type of Visit Initial OP PT Evaluation   General Information   Start of Care Date 08/18/21   Referring Physician VERONIQUE Subramanian CNP   Orders Evaluate and Treat as Indicated   Order Date 08/09/21   Medical Diagnosis Benign paroxysmal positional vertigo of right ear   Onset of illness/injury or Date of Surgery 07/07/21   Precautions/Limitations no known precautions/limitations   Surgical/Medical history reviewed Yes  (h/o middle ear disease)   Pertinent history of current problem (include personal factors and/or comorbidities that impact the POC) Pt notes that for the past 1.5 months has been experiencing dizziness ( light headed, spinning, feels undder water) . Happens at times going from sitting to standinng and at other times just seems to come out of no where. Some balance concerns, however feels this is baseline. Had seen ENT for chronic middle ear disease and report no change in symptoms with that treatment. No symptoms in/out of bed. No ear pain, intermittant ringing in ear. Denies vision changes.    Pertinent Visual History  no acute changes   Patient/Family Goals Statement less dizzy   Fall Risk Screen   Have you fallen 2 or more times in the past year? Yes   Have you fallen and had an injury in the past year? No   Timed Up and Go score (seconds) 10   Is patient a fall risk? No   Abuse Screen (yes response referral indicated)   Feels Unsafe at Home or Work/School no   Feels Threatened by Someone no   Does Anyone Try to Keep You From Having Contact with Others or Doing Things Outside Your Home? no   Physical Signs of Abuse Present no   Pain   Patient currently in pain No   Vitals Signs   Vital Signs Comments BP supine 136/74 HR 82 Standing 131/64  HR 86 symptomatic   Gait Special Tests   Gait Special Tests DYNAMIC GAIT INDEX   Gait Special Tests Dynamic Gait Index   Score out of 24 12/12 4 item DGI   Balance Special Tests    Balance Special Tests Timed up and go;Modified CTSIB Conditions   Balance Special Tests Timed Up and Go   Seconds 10 Seconds   Balance Special Tests Modified CTSIB Conditions   Condition 1, seconds 30 Seconds   Condition 2, seconds 30 Seconds   Condition 4, seconds 30 Seconds   Condition 5, seconds 6 Seconds   Cervicogenic Screen   Neck ROM wnl   Vertebral Artery Test Normal   Oculomotor Exam   Smooth Pursuit Normal   Saccades Normal   VOR Normal   VOR Cancellation Normal   Rapid Head Thrust Normal   Infrared Goggle Exam or Frenzel Lense Exam   Vestibular Suppressant in Last 24 Hours? No   Exam completed with Infrared Goggles   Spontaneous Nystagmus Negative   Gaze Evoked Nystagmus Negative   Head Shake Horizontal Nystagmus Other   Head Shake Horizontal Nystagmus comments no nystagmus however pt reporting dizziness   Maria M-Hallpike (right) Negative   Reynolds-Hallpike (right) comments no nystagmus or symptoms in testing position, reports dizzy/ightheaded with return to sit   Reynolds-Hallpike (Left) Negative   Maria M-Hallpike (left) comments no nystagmus or symptoms in testing position, reports dizzy/ightheaded with return to sit   [facility] Supine Roll Test (Right) Negative   [facility] Supine Roll Test (Left) Negative   Supine Neck Extension Test Negative   Supine Neck Extension Test Comments no nystagmus or symptoms in testing position, reports dizzy/ightheaded with return to sit   Planned Therapy Interventions   Planned Therapy Interventions   (education)   Clinical Impression   Criteria for Skilled Therapeutic Interventions Met evaluation only   PT Diagnosis dizziness   Influenced by the following impairments dizziness   Functional limitations due to impairments difficulty with transfers   Clinical Presentation Stable/Uncomplicated   Clinical Presentation Rationale clinicla judgement, comorbidities   Clinical Decision Making (Complexity) Low complexity   Therapy Frequency 1 time/week   Predicted Duration of Therapy Intervention  (days/wks) 1 time visit   Risk & Benefits of therapy have been explained Yes   Patient, Family & other staff in agreement with plan of care Yes   Clinical Impression Comments Pt presenting with history of dizziness when transfering to stand. Today balance testing wfl, negative positoinal testing for BPPV however does report symptoms with transitions from supine to sitting and supine to standing. Negative OCM testing. Anticipate symptoms related to orthostatic hypotension. education provided and pt to follow up only with pcp if symptoms do not improve.   Education Assessment   Preferred Learning Style Pictures/video;Demonstration   Barriers to Learning No barriers   GOALS   PT Eval Goals 1;2;3   Goal 1   Goal Identifier 1   Goal Description Pt will verbalize understanding POC   Target Date 08/18/21   Date Met 08/18/21   Total Evaluation Time   PT Eval, Low Complexity Minutes (30435) 35   Therapy Certification   Certification date from 08/18/21   Certification date to 08/18/21   Medical Diagnosis Benign paroxysmal positional vertigo of right ear                                                                                                 [facility]        OUTPATIENT PHYSICAL THERAPY FUNCTIONAL EVALUATION  PLAN OF TREATMENT FOR OUTPATIENT REHABILITATION  (COMPLETE FOR INITIAL CLAIMS ONLY)  Patient's Last Name, First Name, M.I.  YOB: 1985  Divina Delgadillo     Provider's Name   [facility]   Medical Record No.  [id number]     Start of Care Date:  08/18/21   Onset Date:  07/07/21   Type:     _X__PT   ____OT  ____SLP Medical Diagnosis:  Benign paroxysmal positional vertigo of right ear     PT Diagnosis:  dizziness Visits from SOC:  1                              __________________________________________________________________________________  Plan of Treatment/Functional Goals:   (education)           GOALS  1  Pt will verbalize understanding POC  08/18/21        Therapy Frequency:  1 time/week   Predicted Duration of Therapy Intervention:  1 time visit    Lily Dangelo, PT                                    I CERTIFY THE NEED FOR THESE SERVICES FURNISHED UNDER        THIS PLAN OF TREATMENT AND WHILE UNDER MY CARE     (Physician co-signature of this document indicates review and certification of the therapy plan).                Certification Date From:  08/18/21   Certification Date To:  08/18/21    Referring Provider:  VERONIQUE Subramanian CNP    Initial Assessment  See Epic Evaluation- Start of Care Date: 08/18/21

## 2021-08-30 PROBLEM — G47.00 INSOMNIA, UNSPECIFIED TYPE: Status: ACTIVE | Noted: 2021-08-30

## 2021-09-03 DIAGNOSIS — J45.21 MILD INTERMITTENT ASTHMA WITH ACUTE EXACERBATION: ICD-10-CM

## 2021-09-03 DIAGNOSIS — I10 ESSENTIAL HYPERTENSION: Chronic | ICD-10-CM

## 2021-09-03 RX ORDER — AMLODIPINE BESYLATE 10 MG/1
TABLET ORAL
Qty: 28 TABLET | Refills: 4 | Status: SHIPPED | OUTPATIENT
Start: 2021-09-03 | End: 2022-02-02

## 2021-09-03 RX ORDER — MONTELUKAST SODIUM 10 MG/1
TABLET ORAL
Qty: 28 TABLET | Refills: 4 | Status: SHIPPED | OUTPATIENT
Start: 2021-09-03 | End: 2022-02-24

## 2021-09-03 RX ORDER — CARVEDILOL 6.25 MG/1
TABLET ORAL
Qty: 56 TABLET | Refills: 4 | Status: SHIPPED | OUTPATIENT
Start: 2021-09-03 | End: 2022-02-18

## 2021-09-03 NOTE — TELEPHONE ENCOUNTER
"Routing refill request for amlodipine to provider for review/approval because: Labs out of range:  Cr     Prescription for montelukast & carvedilol approved per [facility] Refill Protocol.      ACT Total Scores 5/21/2020 2/18/2021 8/9/2021   ACT TOTAL SCORE (Goal Greater than or Equal to 20) 22 25 25   In the past 12 months, how many times did you visit the emergency room for your asthma without being admitted to the hospital? 0 0 0   In the past 12 months, how many times were you hospitalized overnight because of your asthma? 0 0 0     Requested Prescriptions   Pending Prescriptions Disp Refills     carvedilol (COREG) 6.25 MG tablet [Pharmacy Med Name: Carvedilol 6.25 MG Tablet] 56 tablet 10     Sig: TAKE ONE TABLET BY MOUTH TWICE DAILY       Beta-Blockers Protocol Passed - 9/3/2021  8:02 AM        Passed - Blood pressure under 140/90 in past 12 months     BP Readings from Last 3 Encounters:   08/09/21 120/64   07/23/21 130/58   06/17/21 133/71                 Passed - Patient is age 6 or older        Passed - Recent (12 mo) or future (30 days) visit within the authorizing provider's specialty     Patient has had an office visit with the authorizing provider or a provider within the authorizing providers department within the previous 12 mos or has a future within next 30 days. See \"Patient Info\" tab in inbasket, or \"Choose Columns\" in Meds & Orders section of the refill encounter.              Passed - Medication is active on med list           amLODIPine (NORVASC) 10 MG tablet [Pharmacy Med Name: amLODIPine Besylate 10 MG Tablet] 28 tablet 10     Sig: TAKE ONE TABLET BY MOUTH DAILY       Calcium Channel Blockers Protocol  Failed - 9/3/2021  8:02 AM        Failed - Normal serum creatinine on file in past 12 months     Recent Labs   Lab Test 08/09/21  1316 09/01/17  1049   CR 1.35*  --    CREAT  --  1.6*       Ok to refill medication if creatinine is low          Passed - Blood pressure under 140/90 in past 12 months    " " BP Readings from Last 3 Encounters:   08/09/21 120/64   07/23/21 130/58   06/17/21 133/71                 Passed - Recent (12 mo) or future (30 days) visit within the authorizing provider's specialty     Patient has had an office visit with the authorizing provider or a provider within the authorizing providers department within the previous 12 mos or has a future within next 30 days. See \"Patient Info\" tab in inbasket, or \"Choose Columns\" in Meds & Orders section of the refill encounter.              Passed - Medication is active on med list        Passed - Patient is age 18 or older        Passed - No active pregnancy on record        Passed - No positive pregnancy test in past 12 months         Signed Prescriptions Disp Refills    montelukast (SINGULAIR) 10 MG tablet 28 tablet 4     Sig: TAKE ONE TABLET BY MOUTH AT BEDTIME       Leukotriene Inhibitors Protocol Passed - 9/3/2021  8:02 AM        Passed - Patient is age 12 or older     If patient is under 16, ok to refill using age based dosing.           Passed - Asthma control assessment score within normal limits in last 6 months     Please review ACT score.           Passed - Medication is active on med list        Passed - Recent (6 mo) or future (30 days) visit within the authorizing provider's specialty     Patient had office visit in the last 6 months or has a visit in the next 30 days with authorizing provider or within the authorizing provider's specialty.  See \"Patient Info\" tab in inbasket, or \"Choose Columns\" in Meds & Orders section of the refill encounter.                 "

## 2021-09-05 ENCOUNTER — HEALTH MAINTENANCE LETTER (OUTPATIENT)
Age: 36
End: 2021-09-05

## 2021-09-08 ENCOUNTER — TELEPHONE (OUTPATIENT)
Dept: FAMILY MEDICINE | Facility: CLINIC | Age: 36
End: 2021-09-08

## 2021-09-08 NOTE — TELEPHONE ENCOUNTER
Group home calls and Jaleesa missed a page of medications to sign off.  Fax number provided and they will send this page. Debora LOPEZ RN

## 2021-10-06 ENCOUNTER — TELEPHONE (OUTPATIENT)
Dept: NEPHROLOGY | Facility: CLINIC | Age: 36
End: 2021-10-06

## 2021-10-06 NOTE — TELEPHONE ENCOUNTER
10/6 2nd attempt. Called and left voicemail. Provided phone number [phone] to schedule follow up with labs prior around 12/15/2021.     Kelly irizarry Procedure   Orthopedics, Podiatry, Sports Medicine, ENT/Eye Specialties  [facility]   [phone]

## 2021-10-28 ENCOUNTER — OFFICE VISIT (OUTPATIENT)
Dept: FAMILY MEDICINE | Facility: CLINIC | Age: 36
End: 2021-10-28
Payer: MEDICARE

## 2021-10-28 VITALS
OXYGEN SATURATION: 96 % | DIASTOLIC BLOOD PRESSURE: 60 MMHG | SYSTOLIC BLOOD PRESSURE: 110 MMHG | WEIGHT: 198.9 LBS | HEART RATE: 87 BPM | TEMPERATURE: 98.5 F | HEIGHT: 63 IN | BODY MASS INDEX: 35.24 KG/M2

## 2021-10-28 DIAGNOSIS — E11.42 DIABETIC POLYNEUROPATHY ASSOCIATED WITH TYPE 2 DIABETES MELLITUS (H): ICD-10-CM

## 2021-10-28 DIAGNOSIS — Z23 NEED FOR VACCINATION: ICD-10-CM

## 2021-10-28 DIAGNOSIS — E78.5 HYPERLIPIDEMIA LDL GOAL <100: Chronic | ICD-10-CM

## 2021-10-28 DIAGNOSIS — M72.2 PLANTAR FASCIITIS: Primary | ICD-10-CM

## 2021-10-28 DIAGNOSIS — Z23 NEED FOR PROPHYLACTIC VACCINATION AND INOCULATION AGAINST INFLUENZA: ICD-10-CM

## 2021-10-28 DIAGNOSIS — E66.01 MORBID OBESITY (H): ICD-10-CM

## 2021-10-28 DIAGNOSIS — I10 ESSENTIAL HYPERTENSION: Chronic | ICD-10-CM

## 2021-10-28 LAB
ANION GAP SERPL CALCULATED.3IONS-SCNC: 2 MMOL/L (ref 3–14)
BUN SERPL-MCNC: 16 MG/DL (ref 7–30)
CALCIUM SERPL-MCNC: 8.7 MG/DL (ref 8.5–10.1)
CHLORIDE BLD-SCNC: 110 MMOL/L (ref 94–109)
CO2 SERPL-SCNC: 27 MMOL/L (ref 20–32)
CREAT SERPL-MCNC: 1.33 MG/DL (ref 0.52–1.04)
GFR SERPL CREATININE-BSD FRML MDRD: 52 ML/MIN/1.73M2
GLUCOSE BLD-MCNC: 82 MG/DL (ref 70–99)
HBA1C MFR BLD: 7.8 % (ref 0–5.6)
POTASSIUM BLD-SCNC: 4.3 MMOL/L (ref 3.4–5.3)
SODIUM SERPL-SCNC: 139 MMOL/L (ref 133–144)

## 2021-10-28 PROCEDURE — 90686 IIV4 VACC NO PRSV 0.5 ML IM: CPT | Performed by: NURSE PRACTITIONER

## 2021-10-28 PROCEDURE — 90732 PPSV23 VACC 2 YRS+ SUBQ/IM: CPT | Performed by: NURSE PRACTITIONER

## 2021-10-28 PROCEDURE — 36415 COLL VENOUS BLD VENIPUNCTURE: CPT | Performed by: NURSE PRACTITIONER

## 2021-10-28 PROCEDURE — 99214 OFFICE O/P EST MOD 30 MIN: CPT | Mod: 25 | Performed by: NURSE PRACTITIONER

## 2021-10-28 PROCEDURE — 83036 HEMOGLOBIN GLYCOSYLATED A1C: CPT | Performed by: NURSE PRACTITIONER

## 2021-10-28 PROCEDURE — 80048 BASIC METABOLIC PNL TOTAL CA: CPT | Performed by: NURSE PRACTITIONER

## 2021-10-28 PROCEDURE — 90471 IMMUNIZATION ADMIN: CPT | Performed by: NURSE PRACTITIONER

## 2021-10-28 PROCEDURE — 90472 IMMUNIZATION ADMIN EACH ADD: CPT | Performed by: NURSE PRACTITIONER

## 2021-10-28 RX ORDER — GABAPENTIN 300 MG/1
CAPSULE ORAL
Qty: 90 CAPSULE | Refills: 3 | Status: SHIPPED | OUTPATIENT
Start: 2021-10-28 | End: 2022-01-12

## 2021-10-28 ASSESSMENT — MIFFLIN-ST. JEOR: SCORE: 1566.33

## 2021-10-28 NOTE — PATIENT INSTRUCTIONS
Recommend to try massage, stretching and follow up with podiatrist if no improvement  Increase Gabapentin 300 mg AM and 600 mg PM     Patient Education     Plantar Fasciitis  Plantar fasciitis is a painful swelling of the plantar fascia. The plantar fascia is a thick, fibrous layer of tissue that covers the bones on the bottom of your foot. It supports the foot bones in an arched position.  Plantar fasciitis can happen gradually or suddenly. It usually affects one foot at a time. Heel pain can be sharp, like a knife sticking into the bottom of your foot. You may feel pain after exercising, long-distance jogging, stair climbing, long periods of standing, or after standing up.  Risk factors include: non-active lifestyle, arthritis, diabetes, obesity or recent weight gain, flat foot, high arch. Wearing high heels, loose shoes, or shoes with poor arch support for long periods of time adds to the risk. This problem is commonly found in runners and dancers. It also found in people who stand on hard surfaces for long periods of time.  Foot pain from this condition is usually worse in the morning. But it often improves with walking. By the end of the day there may be a dull aching. Treatment requires short-term rest and controlling swelling. It may take up to 9 months before all symptoms go away. Rarely, a steroid injection into the foot, or surgery, may be needed.  Home care    If you are overweight, lose weight to help healing.    Choose supportive shoes with good arch support and shock absorbency. Replace athletic shoes when they become worn out. Don t walk or run barefoot.    Premade or custom-fitted shoe inserts may be helpful. Inserts made of silicone seem to be the most effective. Custom-made inserts can be provided by foot specialist, physical therapist, or orthopedist.    Premade or custom-made night splints keep the heel stretched out while you sleep. They may prevent morning pain.    Limit activities that stress  the feet: jogging, prolonged standing or walking, contact sports, etc.    First thing in the morning and before sports, stretch the bottom of your feet. Gently flex your ankle so the toes move toward your knee.    Icing may help control heel pain. Apply an ice pack to the heel for 10 to 20 minutes as a preventive. Or ice your heel after a severe flare-up of symptoms. You may repeat this every 1 to 2 hours as needed.    You may use over-the-counter pain medicine to control pain, unless another medicine was prescribed. Anti-inflammatory pain medicines, such as ibuprofen or naproxen, may work better than acetaminophen. If you have chronic liver or kidney disease or ever had a stomach ulcer or gastrointestinal bleeding, talk with your healthcare provider before using these medicines.  Follow-up care  Follow up with your healthcare provider, or as advised.  Call for an appointment if pain worsens or there is no relief after a few weeks of home treatment. Shoe inserts, a night splint, or a special boot may be required.  If X-rays were taken, you will be told of any new findings that may affect your care.  When to seek medical advice  Call your healthcare provider right away if any of these occur:    Foot swelling    Redness or warmth with increasing pain  Valeriy last reviewed this educational content on 5/1/2018 2000-2021 [employer]. All rights reserved. This information is not intended as a substitute for professional medical care. Always follow your healthcare professional's instructions.

## 2021-10-28 NOTE — PROGRESS NOTES
Assessment & Plan     Plantar fasciitis  -recommended stretching exercises, printouts provided with exercises for plantar fascitis  -follow up with podiatry if no improvement in 1-2 weeks .   - Orthopedic  Referral; Future    Need for vaccination  - Pneumococcal vaccine 23 valent PPSV23  (Pneumovax) [03226]    Uncontrolled type 2 diabetes mellitus with diabetic polyneuropathy, with long-term current use of insulin (H)  -recommended to follow diabetic diet more closely, exercise and try to work on weight loss  -follow up in 3 months  -continue current treatment plan   - Hemoglobin A1c; Future  - Basic metabolic panel  (Ca, Cl, CO2, Creat, Gluc, K, Na, BUN); Future  - Hemoglobin A1c  - Basic metabolic panel  (Ca, Cl, CO2, Creat, Gluc, K, Na, BUN)    Diabetic polyneuropathy associated with type 2 diabetes mellitus (H)  -will try higher dose of Gabapentin   - gabapentin (NEURONTIN) 300 MG capsule; 300 AM and 600 mg at bedtime    Need for prophylactic vaccination and inoculation against influenza    - INFLUENZA VACCINE IM > 6 MONTHS VALENT IIV4 (AFLURIA/FLUZONE)    Hyperlipidemia  -continue Lipitor, denies side effects    Hypertension  -well controlled     Morbid obesity (H)    -BMI over 35, obesity complicated by diabetes and hypertension  -advised Divina to work on weight loss and try to exercise more.     VERONIQUE Spears CNP  [facility]    Rhiannon Murcia is a 36 year old who presents for the following health issues     Chief Complaint   Patient presents with     Recheck Medication     She would like to discuss taking Gabapentin more then once daily      Imm/Inj     Flu Shot       HPI     Diabetes Follow-up    How often are you checking your blood sugar? Three times daily  Blood sugar testing frequency justification:  Adjustment of medication(s)  What time of day are you checking your blood sugars (select all that apply)?  Before meals  Have you had any blood sugars above 200?  " Yes- 259, 248, sometimes, usually in 120-145 range, patient brought list of her home glucose readings   Have you had any blood sugars below 70?  No    What symptoms do you notice when your blood sugar is low?  Weak    What concerns do you have today about your diabetes? Other: Nueuropathy in feet      Do you have any of these symptoms? (Select all that apply)  Burning in feet    Have you had a diabetic eye exam in the last 12 months? No        Hyperlipidemia Follow-Up      Are you regularly taking any medication or supplement to lower your cholesterol?   Yes- Lipitor     Are you having muscle aches or other side effects that you think could be caused by your cholesterol lowering medication?  No    Hypertension Follow-up      Do you check your blood pressure regularly outside of the clinic? Yes at home     Are you following a low salt diet? No    Are your blood pressures ever more than 140 on the top number (systolic) OR more   than 90 on the bottom number (diastolic), for example 140/90? No    BP Readings from Last 2 Encounters:   10/28/21 110/60   08/09/21 120/64     Hemoglobin A1C (%)   Date Value   10/28/2021 7.8 (H)   07/08/2021 7.4 (H)   03/26/2021 8.0 (H)     LDL Cholesterol Calculated (mg/dL)   Date Value   03/26/2021 72   01/02/2020 78         Review of Systems   Constitutional, HEENT, cardiovascular, pulmonary, gi and gu systems are negative, except as otherwise noted.      Objective    /60 (BP Location: Right arm, Patient Position: Sitting, Cuff Size: Adult Large)   Pulse 87   Temp 98.5  F (36.9  C) (Tympanic)   Ht 1.6 m (5' 3\")   Wt 90.2 kg (198 lb 14.4 oz)   SpO2 96%   BMI 35.23 kg/m    Body mass index is 35.23 kg/m .  Physical Exam   GENERAL: healthy, alert and no distress  EYES: Eyes grossly normal to inspection, PERRL and conjunctivae and sclerae normal  CV: regular rate and rhythm, normal S1 S2, no S3 or S4, no murmur, click or rub, no peripheral edema and peripheral pulses strong  MS: " no gross musculoskeletal defects noted, no edema  SKIN: no suspicious lesions or rashes  NEURO: Normal strength and tone, mentation intact and speech normal  PSYCH: mentation appears normal, affect normal/bright    Results for orders placed or performed in visit on 10/28/21   Hemoglobin A1c     Status: Abnormal   Result Value Ref Range    Hemoglobin A1C 7.8 (H) 0.0 - 5.6 %   Basic metabolic panel  (Ca, Cl, CO2, Creat, Gluc, K, Na, BUN)     Status: Abnormal   Result Value Ref Range    Sodium 139 133 - 144 mmol/L    Potassium 4.3 3.4 - 5.3 mmol/L    Chloride 110 (H) 94 - 109 mmol/L    Carbon Dioxide (CO2) 27 20 - 32 mmol/L    Anion Gap 2 (L) 3 - 14 mmol/L    Urea Nitrogen 16 7 - 30 mg/dL    Creatinine 1.33 (H) 0.52 - 1.04 mg/dL    Calcium 8.7 8.5 - 10.1 mg/dL    Glucose 82 70 - 99 mg/dL    GFR Estimate 52 (L) >60 mL/min/1.73m2

## 2021-11-02 ENCOUNTER — TELEPHONE (OUTPATIENT)
Dept: FAMILY MEDICINE | Facility: CLINIC | Age: 36
End: 2021-11-02

## 2021-11-02 DIAGNOSIS — E11.42 DIABETIC POLYNEUROPATHY ASSOCIATED WITH TYPE 2 DIABETES MELLITUS (H): ICD-10-CM

## 2021-11-02 DIAGNOSIS — Z79.4 TYPE 2 DIABETES MELLITUS WITH STAGE 3B CHRONIC KIDNEY DISEASE, WITH LONG-TERM CURRENT USE OF INSULIN (H): ICD-10-CM

## 2021-11-02 DIAGNOSIS — E11.22 TYPE 2 DIABETES MELLITUS WITH STAGE 3B CHRONIC KIDNEY DISEASE, WITH LONG-TERM CURRENT USE OF INSULIN (H): ICD-10-CM

## 2021-11-02 DIAGNOSIS — N18.32 TYPE 2 DIABETES MELLITUS WITH STAGE 3B CHRONIC KIDNEY DISEASE, WITH LONG-TERM CURRENT USE OF INSULIN (H): ICD-10-CM

## 2021-11-02 NOTE — TELEPHONE ENCOUNTER
"Reason for Call:  Medication or medication refill:    Do you use a [facility]?  Name of the pharmacy and phone number for the current request:  [facility]    Name of the medication requested: Do from pt's [facility] calling to ask CASPER Velasquez for Rx for \"Glucose tabs.\"  No need to call Do back, unless there are questions or problems.      Other request:     Can we leave a detailed message on this number? YES    Phone number Do can be reached at: Other phone number:  [phone]    Best Time: any    Call taken on 11/2/2021 at 10:55 AM by Dolly Lan      "

## 2021-11-11 ENCOUNTER — OFFICE VISIT (OUTPATIENT)
Dept: PODIATRY | Facility: CLINIC | Age: 36
End: 2021-11-11
Payer: MEDICARE

## 2021-11-11 ENCOUNTER — LAB (OUTPATIENT)
Dept: LAB | Facility: CLINIC | Age: 36
End: 2021-11-11
Payer: MEDICARE

## 2021-11-11 VITALS
SYSTOLIC BLOOD PRESSURE: 124 MMHG | BODY MASS INDEX: 35.08 KG/M2 | HEART RATE: 87 BPM | HEIGHT: 63 IN | DIASTOLIC BLOOD PRESSURE: 58 MMHG | WEIGHT: 198 LBS

## 2021-11-11 DIAGNOSIS — Z79.899 NEED FOR PROPHYLACTIC CHEMOTHERAPY: ICD-10-CM

## 2021-11-11 DIAGNOSIS — F20.9 SCHIZOPHRENIA, UNSPECIFIED TYPE (H): ICD-10-CM

## 2021-11-11 DIAGNOSIS — M72.2 PLANTAR FASCIITIS, RIGHT: Primary | ICD-10-CM

## 2021-11-11 DIAGNOSIS — M72.2 PLANTAR FASCIITIS, LEFT: ICD-10-CM

## 2021-11-11 DIAGNOSIS — M72.2 PLANTAR FASCIITIS: ICD-10-CM

## 2021-11-11 DIAGNOSIS — F20.9 SCHIZOPHRENIA, UNSPECIFIED TYPE (H): Primary | ICD-10-CM

## 2021-11-11 LAB
BASOPHILS # BLD AUTO: 0.1 10E3/UL (ref 0–0.2)
BASOPHILS NFR BLD AUTO: 1 %
EOSINOPHIL # BLD AUTO: 0.3 10E3/UL (ref 0–0.7)
EOSINOPHIL NFR BLD AUTO: 2 %
ERYTHROCYTE [DISTWIDTH] IN BLOOD BY AUTOMATED COUNT: 14.1 % (ref 10–15)
HCT VFR BLD AUTO: 37 % (ref 35–47)
HGB BLD-MCNC: 12.2 G/DL (ref 11.7–15.7)
LYMPHOCYTES # BLD AUTO: 5.6 10E3/UL (ref 0.8–5.3)
LYMPHOCYTES NFR BLD AUTO: 32 %
MCH RBC QN AUTO: 30.5 PG (ref 26.5–33)
MCHC RBC AUTO-ENTMCNC: 33 G/DL (ref 31.5–36.5)
MCV RBC AUTO: 93 FL (ref 78–100)
MONOCYTES # BLD AUTO: 1.3 10E3/UL (ref 0–1.3)
MONOCYTES NFR BLD AUTO: 7 %
NEUTROPHILS # BLD AUTO: 10.3 10E3/UL (ref 1.6–8.3)
NEUTROPHILS NFR BLD AUTO: 58 %
PLATELET # BLD AUTO: 405 10E3/UL (ref 150–450)
RBC # BLD AUTO: 4 10E6/UL (ref 3.8–5.2)
WBC # BLD AUTO: 17.5 10E3/UL (ref 4–11)

## 2021-11-11 PROCEDURE — 99213 OFFICE O/P EST LOW 20 MIN: CPT | Performed by: PODIATRIST

## 2021-11-11 PROCEDURE — 85025 COMPLETE CBC W/AUTO DIFF WBC: CPT

## 2021-11-11 PROCEDURE — 36415 COLL VENOUS BLD VENIPUNCTURE: CPT

## 2021-11-11 RX ORDER — LOPERAMIDE HYDROCHLORIDE 2 MG/1
TABLET ORAL
COMMUNITY
Start: 2021-03-04 | End: 2024-02-05

## 2021-11-11 RX ORDER — BISACODYL 10 MG
10 SUPPOSITORY, RECTAL RECTAL
COMMUNITY
Start: 2021-11-02 | End: 2022-10-27

## 2021-11-11 RX ORDER — DOCUSATE SODIUM 100 MG/1
CAPSULE, LIQUID FILLED ORAL DAILY PRN
COMMUNITY
Start: 2021-09-28 | End: 2022-07-06

## 2021-11-11 RX ORDER — CALCIUM CARBONATE 500 MG/1
1 TABLET, CHEWABLE ORAL 2 TIMES DAILY
COMMUNITY
End: 2022-10-27

## 2021-11-11 RX ORDER — QUETIAPINE FUMARATE 25 MG/1
25 TABLET, FILM COATED ORAL
COMMUNITY
Start: 2020-12-01 | End: 2024-02-05

## 2021-11-11 RX ORDER — POLYETHYLENE GLYCOL 3350 17 G/17G
17 POWDER, FOR SOLUTION ORAL PRN
COMMUNITY
Start: 2021-11-02 | End: 2022-10-27

## 2021-11-11 ASSESSMENT — MIFFLIN-ST. JEOR: SCORE: 1562.25

## 2021-11-11 ASSESSMENT — PAIN SCALES - GENERAL: PAINLEVEL: MODERATE PAIN (4)

## 2021-11-11 NOTE — NURSING NOTE
"Chief Complaint   Patient presents with     Consult     BL plantarfascitits       Initial /58   Pulse 87   Ht 1.6 m (5' 3\")   Wt 89.8 kg (198 lb)   BMI 35.07 kg/m   Estimated body mass index is 35.07 kg/m  as calculated from the following:    Height as of this encounter: 1.6 m (5' 3\").    Weight as of this encounter: 89.8 kg (198 lb).  Medications and allergies reviewed.      Pamela COOK MA    "

## 2021-11-11 NOTE — PATIENT INSTRUCTIONS
Next Steps:      1. Support:  a. Wear supportive shoes, sandals, boots and/or inserts that have a rigid supportive sole.    i. This will offload the majority of tension forces that travel through your feet every step you take.    1. Skechers Max Cushioning Elite/Premier   2. Skechers Relax Fit D'Lux Walker  3. Reebok Walk Ultra 7 DMX MAX   4. Hoka Bondi walking shoes  5. Superfeet inserts ([email or web address])  b. It is important that you also wear supportive shoe wear in the house to continue providing support to your feet.    c. You may always use a cushioned liner for your shoes if that makes your feet feel better.  2. Stretching  a. Calf stretching is essential to offload the tension forces that travel through your feet every step you take  b. Preferred calf stretch is the Runner's Stretch  i. Place one foot behind the other foot, flat against the ground (it is important to keep the heel on the ground).  The back leg is the one that will be stretched.  1. Start with the knee straight and lean your hips into the wall, counter or whatever you are leaning into - count to ten.  2. Next, bend the knee.  You should feel the stretch lower in the calf muscle - count to ten.  c. Repeat this stretch once an hour to start off with.  When symptoms subside, I recommend performing the stretch 3 times daily to prevent any future problems.                3. Tissue Massage  a. It is important that you physically loosen the inflammation tissue to help your body heal the injured tissue.  b. I recommend soaking your foot in warm water to increase the microcirculation to the soft tissues.  You may add Epson salt to the water if you prefer.  c. You may apply an over-the-counter muscle rub, such as Icy Hot roll-on, and deeply massage the injured tissue.  4. Reduce Inflammation  a. You can ice the injured tissue with an ice pack with a light cloth covering or soaking in ice water 20 minutes to reduce any acute inflammation, typically at  the end of the day.    It is important to understand that most problems that develop in the foot and ankle are caused by excessive tension that cause microinjury to the soft tissues and inflammation in the foot and ankle.  By addressing the underlying causes with support and stretching as well as treating the current inflammatory conditions with tissue massage and anti-inflammatory treatments, most foot and ankle musculoskeletal conditions will resolve.  This may take time to heal.  However, if symptoms persist past 4 weeks you should return to the office for reevaluation to determine further treatment options.    SMOKING CESSATION  What's in cigarette smoke? - Cigarette smoke contains over 4,000 chemicals. Nicotine is one of the main ingredients which is an insecticide/herbicide. It is poisonous to our nervous system, increases blood clotting risk, and decreases the body's defenses to fight off infection. Another chemical is Carbon Monoxide is an asphyxiating gas that permanently binds to blood cells and blocks the transport of oxygen. This leads to tissue death and decreases your metabolism. Tar is a chemical that coats your lungs and trachea which impairs new oxygen coming in and carbon dioxide getting out of your body.   How does smoking impact surgery? - Smoking is particularly hazardous with regards to surgery. Surgery puts stress on the body and a smoker's body is already under strain from these chemicals. Putting the two together, especially for an elective surgery, could be a recipe for disaster. Smoking before and after surgery increases your risk of heart problems, slow wound healing, delayed bone healing, blood clots, wound infection and anesthesia complications.   What are the benefits of quitting? - In 20 minutes your blood pressure will drop back down to normal. In 8 hours the carbon monoxide (a toxic gas) levels in your blood stream will drop by half, and oxygen levels will return to normal. In 48  hours your chance of having a heart attack will have decreased. All nicotine will have left your body. Your sense of taste and smell will return to a normal level. In 72 hours your bronchial tubes will relax, and your energy levels will increase. In 2 weeks your circulation will increase, and it will continue to improve for the next 10 weeks.    Recommendations for elective surgery - Ideally, patients should quit smoking 8 weeks before and at least 2 weeks after elective surgery in order to avoid complications. Simply cutting back on the amount of cigarettes smoked per day does not offer any benefit or decrease the risk of poor wound healing, heart problems, and infection. Smokers should also start taking Vitamin C and B for two weeks before surgery and two weeks after surgery.    Ways to Stop Smokin. Nicotine patches, lozenges, or gum  2. Support Groups  3. Medications (see below)    List of Medications:  1. Varenicline Tartrate (CHANTIX) (may be discontinued by FDA as of 2021)  2. Bupropion HCL (WELLBUTRIN, ZYBAN) - note: make sure Wellbutrin is for smoking cessation and not other issues   3. Nicotine Patch (NICODERM)   4. Nicotine Inhaler (NICOTROL)   5. Nicotine Gum Nicotine Polacrilex   6. Nicotine Lozenge: Nicotine Polacrilex (COMMIT)   * [facility] offers a smoking support group as well!  Please visit: [email or web address]  If you are interested in these, ask about getting a prescription or talk to your primary care doctor about what may be the best way for you to quit.

## 2021-11-11 NOTE — LETTER
11/11/2021         RE: Divina Delgadillo  [address]  [place] [address]        Dear Colleague,    Thank you for referring your patient, Divina Delgadillo, to the [facility]. Please see a copy of my visit note below.    PATIENT HISTORY:  Divina returns to clinic in consultation at the request of  Jaleesa Velasquez C.N.P. with a chief complaint of bilateral heel pain.  The patient relates the pain is primarily located around the bottoms of both heels.  Reports insidious onset without acute precipitating event. that has been going on for several week(s).  The patient has previously tried different shoes with little relief.  Any previous notes and studies that pertain to the patient's condition were reviewed.    Pertinent medical, surgical and family history was reviewed in Epic chart and include type 2 diabetes, esophageal reflux, morbid obesity, vitamin D deficiency    Medications:   Current Outpatient Medications:      ACCU-CHEK GUIDE test strip, USE TO TEST BLOOD SUGAR 3 TIMES DAILY (ACCU CHEK GUIDE)., Disp: 150 strip, Rfl: 2     amLODIPine (NORVASC) 10 MG tablet, TAKE ONE TABLET BY MOUTH DAILY, Disp: 28 tablet, Rfl: 4     ARIPiprazole (ABILIFY) 30 MG tablet, Take 30 mg by mouth daily, Disp: , Rfl:      atorvastatin (LIPITOR) 10 MG tablet, TAKE ONE TABLET BY MOUTH DAILY, Disp: 28 tablet, Rfl: 10     BIOTIN FORTE 5 MG TABS, TAKE ONE TABLET BY MOUTH DAILY, Disp: 60 tablet, Rfl: 10     blood glucose (ACCU-CHEK GUIDE) test strip, TEST BLOOD SUGAR THREE TIMES DAILY, Disp: 100 strip, Rfl: 1     blood glucose monitoring (ACCU-CHEK FASTCLIX) lancets, Use to test blood sugar 3 times daily, Disp: 102 each, Rfl: 3     calcium carbonate (TUMS) 500 MG chewable tablet, Take 1 chew tab by mouth 2 times daily, Disp: , Rfl:      carvedilol (COREG) 6.25 MG tablet, TAKE ONE TABLET BY MOUTH TWICE DAILY, Disp: 56 tablet, Rfl: 4     cloZAPine (CLOZARIL) 100 MG tablet, Take  100 mg by mouth 2 times daily , Disp: , Rfl:      FLUoxetine (PROZAC) 10 MG capsule, Take 1 capsule (10 mg) by mouth daily Plus 20 mg capsule, total 30 mg daily, Disp: 30 capsule, Rfl: 2     FLUoxetine (PROZAC) 20 MG capsule, Take 1 capsule (20 mg) by mouth daily, Disp: 30 capsule, Rfl: 2     gabapentin (NEURONTIN) 300 MG capsule, 300 AM and 600 mg at bedtime, Disp: 90 capsule, Rfl: 3     glucose (BD GLUCOSE) 4 g chewable tablet, Take 1 tablet by mouth every hour as needed for low blood sugar, Disp: 30 tablet, Rfl: 4     insulin glargine (BASAGLAR KWIKPEN) 100 UNIT/ML pen, INJECT 25 UNITS SUB Q DAILY, Disp: 9 mL, Rfl: 10     lamoTRIgine (LAMICTAL) 100 MG tablet, Take 100 mg by mouth At Bedtime, Disp: , Rfl:      lisinopril (ZESTRIL) 5 MG tablet, TAKE ONE TABLET BY MOUTH DAILY, Disp: 30 tablet, Rfl: 3     loratadine (CLARITIN) 10 MG tablet, TAKE ONE TABLET BY MOUTH EVERY MORNING, Disp: 28 tablet, Rfl: 10     magnesium citrate solution, Take 296 mLs by mouth once, Disp: , Rfl:      montelukast (SINGULAIR) 10 MG tablet, TAKE ONE TABLET BY MOUTH AT BEDTIME, Disp: 28 tablet, Rfl: 4     NOVOLOG FLEXPEN 100 UNIT/ML soln, INJECT 1 TO 5 UNITS SUB Q THREE TIMES DAILY WITH MEALS PER SLIDING SCALE, Disp: 9 mL, Rfl: 10     omeprazole (PRILOSEC) 20 MG DR capsule, TAKE ONE  CAPSULE BY MOUTH DAILY, Disp: 28 capsule, Rfl: 10     OZEMPIC, 1 MG/DOSE, 2 MG/1.5ML pen, INJECT 1MG SUB-Q EVERY 7 DAYS (ON THURSDAYS), Disp: 3 mL, Rfl: 5     OZEMPIC, 1 MG/DOSE, 4 MG/3ML SOPN, INJECT 1MG SUB-Q EVERY 7 DAYS (ON THURSDAYS), Disp: 3 mL, Rfl: 1     bisacodyl (DULCOLAX) 10 MG suppository, Place 10 mg rectally (Patient not taking: Reported on 11/11/2021), Disp: , Rfl:      blood glucose monitoring (ACCU-CHEK CARLITOS PLUS) meter device kit, Use to test blood sugar three times daily or as directed., Disp: 1 kit, Rfl: 0     blood glucose monitoring (NO BRAND SPECIFIED) meter device kit, Use to test blood sugar 3 times daily or as directed.  Accu Chek  Guide Meter., Disp: 1 kit, Rfl: 1     ciprofloxacin-dexamethasone (CIPRODEX) 0.3-0.1 % otic suspension, Place 5 drops in both ears twice daily for 14 days. (Patient not taking: Reported on 6/21/2021), Disp: 10 mL, Rfl: 3     docusate sodium (DSS) 100 MG capsule, , Disp: , Rfl:      fluocinolone acetonide 0.01 % OIL, Place 4 drops in ear(s) 2 times daily as needed (ear itching) (Patient not taking: Reported on 8/9/2021), Disp: 20 mL, Rfl: 3     guaiFENesin (ROBITUSSIN) 100 MG/5ML SYRP, Take 5 mLs by mouth (Patient not taking: Reported on 11/11/2021), Disp: , Rfl:      loperamide (IMODIUM A-D) 2 MG tablet, , Disp: , Rfl:      meclizine (ANTIVERT) 25 MG tablet, Take 1 tablet (25 mg) by mouth 3 times daily as needed for dizziness (Patient not taking: Reported on 10/28/2021), Disp: 30 tablet, Rfl: 0     nicotine (NICODERM CQ) 14 MG/24HR 24 hr patch, Place 1 patch onto the skin every 24 hours (Patient not taking: Reported on 10/28/2021), Disp: 30 patch, Rfl: 1     polyethylene glycol (MIRALAX) 17 GM/Dose powder, Take 17 g by mouth (Patient not taking: Reported on 11/11/2021), Disp: , Rfl:      pramox-pe-glycerin-petrolatum (PREPARATION H) 1-0.25-14.4-15 % CREA cream, , Disp: , Rfl:      QUEtiapine (SEROQUEL) 25 MG tablet, Take 25 mg by mouth (Patient not taking: Reported on 11/11/2021), Disp: , Rfl:      traZODone (DESYREL) 50 MG tablet, Take 50 mg by mouth (Patient not taking: Reported on 10/28/2021), Disp: , Rfl:      ULTICARE MINI 31G X 6 MM insulin pen needle, USE 1 PEN NEEDLE DAILY (Patient not taking: Reported on 11/11/2021), Disp: 100 each, Rfl: 10     VENTOLIN  (90 Base) MCG/ACT inhaler, INHALE 2 PUFFS INTO THE LUNGS EVERY SIX  HOURS AS NEEDED FOR SHORTNESS OF BREATH (Patient not taking: Reported on 8/9/2021), Disp: 18 g, Rfl: 10     Allergies:    Allergies   Allergen Reactions     Acetaminophen Other (See Comments)     pt previously tried to overdose on it, PMD does not want pt taking per pt.      Latex  "Rash       Vitals: /58   Pulse 87   Ht 1.6 m (5' 3\")   Wt 89.8 kg (198 lb)   BMI 35.07 kg/m    BMI= Body mass index is 35.07 kg/m .    LOWER EXTREMITY PHYSICAL EXAM    Dermatologic: Skin is intact to right and left lower extremity without significant lesions, rash or abrasion.        Vascular: DP & PT pulses are intact & regular on the right and left.   CFT and skin temperature is normal to the right and left lower extremity.     Neurologic: Lower extremity sensation is intact to light touch.  No evidence of weakness in the right and left lower extremity.        Musculoskeletal: Patient is ambulatory without assistive device or brace.  No gross ankle deformity noted.  No foot or ankle joint effusion is noted.  Noted pain on palpation on the plantar aspect of the right left heel at the insertion point of the plantar fascia bilaterally.  Noted tight gastroc complex bilaterally.         ASSESSMENT / PLAN:     ICD-10-CM    1. Plantar fasciitis, right  M72.2 Orthotics and Prosthetics DME   2. Plantar fasciitis, left  M72.2 Orthotics and Prosthetics DME   3. Plantar fasciitis  M72.2 Orthopedic  Referral   4. Uncontrolled type 2 diabetes mellitus with diabetic polyneuropathy, with long-term current use of insulin (H)  E11.42 Orthotics and Prosthetics DME    Z79.4     E11.65        I have explained to Divina about the conditions.  We discussed the underlying contributing factors to the condition as well as treatment options along with expected length of recovery.  At this time, the patient was educated on the importance of offloading supportive shoes and other devices.  I demonstrated to the patient calf stretches to perform every hour daily until symptoms resolve.  After symptoms resolve, the patient was advised to perform the stretches 3 times daily to prevent future recurrence.  The patient was instructed to perform warm soaks with Epson salt after which to also apply over-the-counter Voltaren gel to " deeply massage the injured tissue.  The patient was instructed to do this on a daily basis until symptoms resolve.   The patient was prescribed custom orthotics that will aid in offloading the tension forces to the soft tissues and prevent further inflammation.  The patient will return in four weeks for reevaluation if the symptoms do not resolve.      Divina verbalized agreement with and understanding of the rational for the diagnosis and treatment plan.  All questions were answered to best of my ability and the patient's satisfaction. The patient was advised to contact the clinic with any questions that may arise after the clinic visit.      Disclaimer: This note consists of symbols derived from keyboarding, dictation and/or voice recognition software. As a result, there may be errors in the script that have gone undetected. Please consider this when interpreting information found in this chart.       JOYCE Hinson.P.MACI., F.A.C.F.A.S.        Again, thank you for allowing me to participate in the care of your patient.        Sincerely,        Raul Douglas DPM

## 2021-11-11 NOTE — PROGRESS NOTES
PATIENT HISTORY:  Divina returns to clinic in consultation at the request of  Jaleesa Velasquez C.N.P. with a chief complaint of bilateral heel pain.  The patient relates the pain is primarily located around the bottoms of both heels.  Reports insidious onset without acute precipitating event. that has been going on for several week(s).  The patient has previously tried different shoes with little relief.  Any previous notes and studies that pertain to the patient's condition were reviewed.    Pertinent medical, surgical and family history was reviewed in Epic chart and include type 2 diabetes, esophageal reflux, morbid obesity, vitamin D deficiency    Medications:   Current Outpatient Medications:      ACCU-CHEK GUIDE test strip, USE TO TEST BLOOD SUGAR 3 TIMES DAILY (ACCU CHEK GUIDE)., Disp: 150 strip, Rfl: 2     amLODIPine (NORVASC) 10 MG tablet, TAKE ONE TABLET BY MOUTH DAILY, Disp: 28 tablet, Rfl: 4     ARIPiprazole (ABILIFY) 30 MG tablet, Take 30 mg by mouth daily, Disp: , Rfl:      atorvastatin (LIPITOR) 10 MG tablet, TAKE ONE TABLET BY MOUTH DAILY, Disp: 28 tablet, Rfl: 10     BIOTIN FORTE 5 MG TABS, TAKE ONE TABLET BY MOUTH DAILY, Disp: 60 tablet, Rfl: 10     blood glucose (ACCU-CHEK GUIDE) test strip, TEST BLOOD SUGAR THREE TIMES DAILY, Disp: 100 strip, Rfl: 1     blood glucose monitoring (ACCU-CHEK FASTCLIX) lancets, Use to test blood sugar 3 times daily, Disp: 102 each, Rfl: 3     calcium carbonate (TUMS) 500 MG chewable tablet, Take 1 chew tab by mouth 2 times daily, Disp: , Rfl:      carvedilol (COREG) 6.25 MG tablet, TAKE ONE TABLET BY MOUTH TWICE DAILY, Disp: 56 tablet, Rfl: 4     cloZAPine (CLOZARIL) 100 MG tablet, Take 100 mg by mouth 2 times daily , Disp: , Rfl:      FLUoxetine (PROZAC) 10 MG capsule, Take 1 capsule (10 mg) by mouth daily Plus 20 mg capsule, total 30 mg daily, Disp: 30 capsule, Rfl: 2     FLUoxetine (PROZAC) 20 MG capsule, Take 1 capsule (20 mg) by mouth daily, Disp: 30  capsule, Rfl: 2     gabapentin (NEURONTIN) 300 MG capsule, 300 AM and 600 mg at bedtime, Disp: 90 capsule, Rfl: 3     glucose (BD GLUCOSE) 4 g chewable tablet, Take 1 tablet by mouth every hour as needed for low blood sugar, Disp: 30 tablet, Rfl: 4     insulin glargine (BASAGLAR KWIKPEN) 100 UNIT/ML pen, INJECT 25 UNITS SUB Q DAILY, Disp: 9 mL, Rfl: 10     lamoTRIgine (LAMICTAL) 100 MG tablet, Take 100 mg by mouth At Bedtime, Disp: , Rfl:      lisinopril (ZESTRIL) 5 MG tablet, TAKE ONE TABLET BY MOUTH DAILY, Disp: 30 tablet, Rfl: 3     loratadine (CLARITIN) 10 MG tablet, TAKE ONE TABLET BY MOUTH EVERY MORNING, Disp: 28 tablet, Rfl: 10     magnesium citrate solution, Take 296 mLs by mouth once, Disp: , Rfl:      montelukast (SINGULAIR) 10 MG tablet, TAKE ONE TABLET BY MOUTH AT BEDTIME, Disp: 28 tablet, Rfl: 4     NOVOLOG FLEXPEN 100 UNIT/ML soln, INJECT 1 TO 5 UNITS SUB Q THREE TIMES DAILY WITH MEALS PER SLIDING SCALE, Disp: 9 mL, Rfl: 10     omeprazole (PRILOSEC) 20 MG DR capsule, TAKE ONE  CAPSULE BY MOUTH DAILY, Disp: 28 capsule, Rfl: 10     OZEMPIC, 1 MG/DOSE, 2 MG/1.5ML pen, INJECT 1MG SUB-Q EVERY 7 DAYS (ON THURSDAYS), Disp: 3 mL, Rfl: 5     OZEMPIC, 1 MG/DOSE, 4 MG/3ML SOPN, INJECT 1MG SUB-Q EVERY 7 DAYS (ON THURSDAYS), Disp: 3 mL, Rfl: 1     bisacodyl (DULCOLAX) 10 MG suppository, Place 10 mg rectally (Patient not taking: Reported on 11/11/2021), Disp: , Rfl:      blood glucose monitoring (ACCU-CHEK CARLITOS PLUS) meter device kit, Use to test blood sugar three times daily or as directed., Disp: 1 kit, Rfl: 0     blood glucose monitoring (NO BRAND SPECIFIED) meter device kit, Use to test blood sugar 3 times daily or as directed.  Accu Chek Guide Meter., Disp: 1 kit, Rfl: 1     ciprofloxacin-dexamethasone (CIPRODEX) 0.3-0.1 % otic suspension, Place 5 drops in both ears twice daily for 14 days. (Patient not taking: Reported on 6/21/2021), Disp: 10 mL, Rfl: 3     docusate sodium (DSS) 100 MG capsule, , Disp: , Rfl:  "     fluocinolone acetonide 0.01 % OIL, Place 4 drops in ear(s) 2 times daily as needed (ear itching) (Patient not taking: Reported on 8/9/2021), Disp: 20 mL, Rfl: 3     guaiFENesin (ROBITUSSIN) 100 MG/5ML SYRP, Take 5 mLs by mouth (Patient not taking: Reported on 11/11/2021), Disp: , Rfl:      loperamide (IMODIUM A-D) 2 MG tablet, , Disp: , Rfl:      meclizine (ANTIVERT) 25 MG tablet, Take 1 tablet (25 mg) by mouth 3 times daily as needed for dizziness (Patient not taking: Reported on 10/28/2021), Disp: 30 tablet, Rfl: 0     nicotine (NICODERM CQ) 14 MG/24HR 24 hr patch, Place 1 patch onto the skin every 24 hours (Patient not taking: Reported on 10/28/2021), Disp: 30 patch, Rfl: 1     polyethylene glycol (MIRALAX) 17 GM/Dose powder, Take 17 g by mouth (Patient not taking: Reported on 11/11/2021), Disp: , Rfl:      pramox-pe-glycerin-petrolatum (PREPARATION H) 1-0.25-14.4-15 % CREA cream, , Disp: , Rfl:      QUEtiapine (SEROQUEL) 25 MG tablet, Take 25 mg by mouth (Patient not taking: Reported on 11/11/2021), Disp: , Rfl:      traZODone (DESYREL) 50 MG tablet, Take 50 mg by mouth (Patient not taking: Reported on 10/28/2021), Disp: , Rfl:      ULTICARE MINI 31G X 6 MM insulin pen needle, USE 1 PEN NEEDLE DAILY (Patient not taking: Reported on 11/11/2021), Disp: 100 each, Rfl: 10     VENTOLIN  (90 Base) MCG/ACT inhaler, INHALE 2 PUFFS INTO THE LUNGS EVERY SIX  HOURS AS NEEDED FOR SHORTNESS OF BREATH (Patient not taking: Reported on 8/9/2021), Disp: 18 g, Rfl: 10     Allergies:    Allergies   Allergen Reactions     Acetaminophen Other (See Comments)     pt previously tried to overdose on it, PMD does not want pt taking per pt.      Latex Rash       Vitals: /58   Pulse 87   Ht 1.6 m (5' 3\")   Wt 89.8 kg (198 lb)   BMI 35.07 kg/m    BMI= Body mass index is 35.07 kg/m .    LOWER EXTREMITY PHYSICAL EXAM    Dermatologic: Skin is intact to right and left lower extremity without significant lesions, rash or " abrasion.        Vascular: DP & PT pulses are intact & regular on the right and left.   CFT and skin temperature is normal to the right and left lower extremity.     Neurologic: Lower extremity sensation is intact to light touch.  No evidence of weakness in the right and left lower extremity.        Musculoskeletal: Patient is ambulatory without assistive device or brace.  No gross ankle deformity noted.  No foot or ankle joint effusion is noted.  Noted pain on palpation on the plantar aspect of the right left heel at the insertion point of the plantar fascia bilaterally.  Noted tight gastroc complex bilaterally.         ASSESSMENT / PLAN:     ICD-10-CM    1. Plantar fasciitis, right  M72.2 Orthotics and Prosthetics DME   2. Plantar fasciitis, left  M72.2 Orthotics and Prosthetics DME   3. Plantar fasciitis  M72.2 Orthopedic  Referral   4. Uncontrolled type 2 diabetes mellitus with diabetic polyneuropathy, with long-term current use of insulin (H)  E11.42 Orthotics [facility] Prosthetics DME    Z79.4     E11.65        I have explained to Divina about the conditions.  We discussed the underlying contributing factors to the condition as well as treatment options along with expected length of recovery.  At this time, the patient was educated on the importance of offloading supportive shoes and other devices.  I demonstrated to the patient calf stretches to perform every hour daily until symptoms resolve.  After symptoms resolve, the patient was advised to perform the stretches 3 times daily to prevent future recurrence.  The patient was instructed to perform warm soaks with Epson salt after which to also apply over-the-counter Voltaren gel to deeply massage the injured tissue.  The patient was instructed to do this on a daily basis until symptoms resolve.   The patient was prescribed custom orthotics that will aid in offloading the tension forces to the soft tissues and prevent further inflammation.  The patient will  return in four weeks for reevaluation if the symptoms do not resolve.      Divina verbalized agreement with and understanding of the rational for the diagnosis and treatment plan.  All questions were answered to best of my ability and the patient's satisfaction. The patient was advised to contact the clinic with any questions that may arise after the clinic visit.      Disclaimer: This note consists of symbols derived from keyboarding, dictation and/or voice recognition software. As a result, there may be errors in the script that have gone undetected. Please consider this when interpreting information found in this chart.       ERNA Douglas D.P.M., F.ANGELES.C.F.A.S.

## 2021-11-16 ENCOUNTER — TELEPHONE (OUTPATIENT)
Dept: FAMILY MEDICINE | Facility: CLINIC | Age: 36
End: 2021-11-16
Payer: MEDICARE

## 2021-11-16 DIAGNOSIS — I10 ESSENTIAL HYPERTENSION: Chronic | ICD-10-CM

## 2021-11-16 NOTE — TELEPHONE ENCOUNTER
Patient called for orders to get a BP machine and Accu- chek guide meter.  patient asked for it to be sent to [facility] pharmacy.       Rebecca Kaur, FRANCISCO Sheriff

## 2021-11-17 ENCOUNTER — TELEPHONE (OUTPATIENT)
Dept: FAMILY MEDICINE | Facility: CLINIC | Age: 36
End: 2021-11-17
Payer: MEDICARE

## 2021-11-17 DIAGNOSIS — E11.9 TYPE 2 DIABETES MELLITUS (H): Primary | ICD-10-CM

## 2021-11-17 RX ORDER — BLOOD-GLUCOSE METER
EACH MISCELLANEOUS
Qty: 1 KIT | Refills: 0 | Status: SHIPPED | OUTPATIENT
Start: 2021-11-17 | End: 2023-01-11

## 2021-11-17 NOTE — TELEPHONE ENCOUNTER
Please send an order for the accu chek guide meter. We received an order for the accu chek [place] meter and Medicare Part B will not allow that to go thru the ins. Thanks

## 2021-11-26 DIAGNOSIS — N18.30 STAGE 3 CHRONIC KIDNEY DISEASE (H): Primary | ICD-10-CM

## 2021-12-01 DIAGNOSIS — Z79.4 TYPE 2 DIABETES MELLITUS WITH STAGE 3B CHRONIC KIDNEY DISEASE, WITH LONG-TERM CURRENT USE OF INSULIN (H): ICD-10-CM

## 2021-12-01 DIAGNOSIS — E11.22 TYPE 2 DIABETES MELLITUS WITH STAGE 3B CHRONIC KIDNEY DISEASE, WITH LONG-TERM CURRENT USE OF INSULIN (H): ICD-10-CM

## 2021-12-01 DIAGNOSIS — I10 ESSENTIAL HYPERTENSION: ICD-10-CM

## 2021-12-01 DIAGNOSIS — N18.32 TYPE 2 DIABETES MELLITUS WITH STAGE 3B CHRONIC KIDNEY DISEASE, WITH LONG-TERM CURRENT USE OF INSULIN (H): ICD-10-CM

## 2021-12-03 RX ORDER — LISINOPRIL 5 MG/1
5 TABLET ORAL DAILY
Qty: 30 TABLET | Refills: 3 | Status: SHIPPED | OUTPATIENT
Start: 2021-12-03 | End: 2022-07-06

## 2021-12-06 ENCOUNTER — LAB (OUTPATIENT)
Dept: LAB | Facility: CLINIC | Age: 36
End: 2021-12-06
Payer: MEDICARE

## 2021-12-06 DIAGNOSIS — Z79.899 NEED FOR PROPHYLACTIC CHEMOTHERAPY: ICD-10-CM

## 2021-12-06 DIAGNOSIS — F20.9 SCHIZOPHRENIA, UNSPECIFIED TYPE (H): ICD-10-CM

## 2021-12-06 LAB
BASOPHILS # BLD AUTO: 0.1 10E3/UL (ref 0–0.2)
BASOPHILS NFR BLD AUTO: 1 %
EOSINOPHIL # BLD AUTO: 0.3 10E3/UL (ref 0–0.7)
EOSINOPHIL NFR BLD AUTO: 2 %
ERYTHROCYTE [DISTWIDTH] IN BLOOD BY AUTOMATED COUNT: 14.3 % (ref 10–15)
HCT VFR BLD AUTO: 38.5 % (ref 35–47)
HGB BLD-MCNC: 12.2 G/DL (ref 11.7–15.7)
LYMPHOCYTES # BLD AUTO: 5.1 10E3/UL (ref 0.8–5.3)
LYMPHOCYTES NFR BLD AUTO: 31 %
MCH RBC QN AUTO: 30.6 PG (ref 26.5–33)
MCHC RBC AUTO-ENTMCNC: 31.7 G/DL (ref 31.5–36.5)
MCV RBC AUTO: 97 FL (ref 78–100)
MONOCYTES # BLD AUTO: 1.3 10E3/UL (ref 0–1.3)
MONOCYTES NFR BLD AUTO: 8 %
NEUTROPHILS # BLD AUTO: 10 10E3/UL (ref 1.6–8.3)
NEUTROPHILS NFR BLD AUTO: 60 %
PLATELET # BLD AUTO: 384 10E3/UL (ref 150–450)
RBC # BLD AUTO: 3.99 10E6/UL (ref 3.8–5.2)
WBC # BLD AUTO: 16.8 10E3/UL (ref 4–11)

## 2021-12-06 PROCEDURE — 36415 COLL VENOUS BLD VENIPUNCTURE: CPT

## 2021-12-06 PROCEDURE — 85025 COMPLETE CBC W/AUTO DIFF WBC: CPT

## 2021-12-13 ENCOUNTER — TELEPHONE (OUTPATIENT)
Dept: EDUCATION SERVICES | Facility: CLINIC | Age: 36
End: 2021-12-13
Payer: MEDICARE

## 2021-12-13 NOTE — TELEPHONE ENCOUNTER
Called patient back 11:40 AM     Works 9:30 to 12:30    Lunch at     Home from work at 1pm -   Dinner marc     Fax number [phone]    Doesn't need to do during     Afternoon blood sugar isn't working out   Wants to switch blood sugar to twice a day   Group home staff wants     Check as needed at work     Fasting and pre dinner     Very rarely using novolog sliding scale  Most always below 175       Lab Results   Component Value Date    A1C 7.8 10/28/2021    A1C 7.4 07/08/2021    A1C 8.0 03/26/2021    A1C 9.0 08/14/2020    A1C 7.8 05/18/2020    A1C 7.4 02/17/2020         Taking diabetes medications?   yes:     Diabetes Medication(s)     Diabetic Other       glucose (BD GLUCOSE) 4 g chewable tablet    Take 1 tablet by mouth every hour as needed for low blood sugar    Insulin       insulin glargine (BASAGLAR KWIKPEN) 100 UNIT/ML pen    INJECT 25 UNITS SUB Q DAILY     NOVOLOG FLEXPEN 100 UNIT/ML soln    INJECT 1 TO 5 UNITS SUB Q THREE TIMES DAILY WITH MEALS PER SLIDING SCALE    Incretin Mimetic Agents (GLP-1 Receptor Agonists)       OZEMPIC, 1 MG/DOSE, 2 MG/1.5ML pen    INJECT 1MG SUB-Q EVERY 7 DAYS (ON THURSDAYS)     OZEMPIC, 1 MG/DOSE, 4 MG/3ML SOPN    INJECT 1MG SUB-Q EVERY 7 DAYS (ON THURSDAYS)

## 2021-12-13 NOTE — TELEPHONE ENCOUNTER
Divina called with a question about checking her BG.   Would like a call back.     Vanessa Mcnulty RD, LD, [facility]

## 2021-12-20 ENCOUNTER — OFFICE VISIT (OUTPATIENT)
Dept: NEPHROLOGY | Facility: CLINIC | Age: 36
End: 2021-12-20
Payer: MEDICARE

## 2021-12-20 ENCOUNTER — LAB (OUTPATIENT)
Dept: LAB | Facility: CLINIC | Age: 36
End: 2021-12-20
Payer: MEDICARE

## 2021-12-20 VITALS
HEART RATE: 86 BPM | DIASTOLIC BLOOD PRESSURE: 72 MMHG | WEIGHT: 202 LBS | SYSTOLIC BLOOD PRESSURE: 115 MMHG | BODY MASS INDEX: 35.78 KG/M2 | OXYGEN SATURATION: 98 %

## 2021-12-20 DIAGNOSIS — E11.22 TYPE 2 DIABETES MELLITUS WITH STAGE 3A CHRONIC KIDNEY DISEASE, WITH LONG-TERM CURRENT USE OF INSULIN (H): ICD-10-CM

## 2021-12-20 DIAGNOSIS — I10 ESSENTIAL HYPERTENSION: ICD-10-CM

## 2021-12-20 DIAGNOSIS — N18.30 STAGE 3 CHRONIC KIDNEY DISEASE (H): ICD-10-CM

## 2021-12-20 DIAGNOSIS — N18.31 TYPE 2 DIABETES MELLITUS WITH STAGE 3A CHRONIC KIDNEY DISEASE, WITH LONG-TERM CURRENT USE OF INSULIN (H): ICD-10-CM

## 2021-12-20 DIAGNOSIS — Z79.4 TYPE 2 DIABETES MELLITUS WITH STAGE 3A CHRONIC KIDNEY DISEASE, WITH LONG-TERM CURRENT USE OF INSULIN (H): ICD-10-CM

## 2021-12-20 DIAGNOSIS — N18.31 STAGE 3A CHRONIC KIDNEY DISEASE (H): Primary | ICD-10-CM

## 2021-12-20 DIAGNOSIS — N25.81 SECONDARY RENAL HYPERPARATHYROIDISM (H): ICD-10-CM

## 2021-12-20 LAB
ALBUMIN SERPL-MCNC: 3.4 G/DL (ref 3.4–5)
ANION GAP SERPL CALCULATED.3IONS-SCNC: 4 MMOL/L (ref 3–14)
BUN SERPL-MCNC: 25 MG/DL (ref 7–30)
CALCIUM SERPL-MCNC: 9.6 MG/DL (ref 8.5–10.1)
CHLORIDE BLD-SCNC: 110 MMOL/L (ref 94–109)
CO2 SERPL-SCNC: 26 MMOL/L (ref 20–32)
CREAT SERPL-MCNC: 1.35 MG/DL (ref 0.52–1.04)
CREAT UR-MCNC: 115 MG/DL
GFR SERPL CREATININE-BSD FRML MDRD: 51 ML/MIN/1.73M2
GLUCOSE BLD-MCNC: 109 MG/DL (ref 70–99)
HGB BLD-MCNC: 13 G/DL (ref 11.7–15.7)
PHOSPHATE SERPL-MCNC: 3.5 MG/DL (ref 2.5–4.5)
POTASSIUM BLD-SCNC: 4.5 MMOL/L (ref 3.4–5.3)
PROT UR-MCNC: 0.2 G/L
PROT/CREAT 24H UR: 0.17 G/G CR (ref 0–0.2)
PTH-INTACT SERPL-MCNC: 52 PG/ML (ref 18–80)
SODIUM SERPL-SCNC: 140 MMOL/L (ref 133–144)

## 2021-12-20 PROCEDURE — 80069 RENAL FUNCTION PANEL: CPT

## 2021-12-20 PROCEDURE — 99214 OFFICE O/P EST MOD 30 MIN: CPT | Mod: GC | Performed by: INTERNAL MEDICINE

## 2021-12-20 PROCEDURE — 85018 HEMOGLOBIN: CPT

## 2021-12-20 PROCEDURE — 84156 ASSAY OF PROTEIN URINE: CPT

## 2021-12-20 PROCEDURE — 83970 ASSAY OF PARATHORMONE: CPT

## 2021-12-20 PROCEDURE — 36415 COLL VENOUS BLD VENIPUNCTURE: CPT

## 2021-12-20 ASSESSMENT — PAIN SCALES - GENERAL: PAINLEVEL: NO PAIN (0)

## 2021-12-20 NOTE — PROGRESS NOTES
12/15/20     CC: CKD Stage 3, HTN    HPI: Divina Delgadillo is a 34 year old female who presents for follow-up of CKD. To review, her hx is significant for diabetes (~ 20 years), bipolar disease - was on lithium therapy for around 6 years but since our initial visit, she has since been taken off of it. Her diabetes was previously very poorly controlled  9% in Nov 2020, now 7.8% in October 2021. Creatinine has been 1.20-1.35 in the past year; stable at 1.35  today. Her last UPCR was 0.14 g/g in December of 2020 - she is taking 5 mg of lisinopril currently.     2019 visit: Divina presents for routine follow-up today.  Her creatinine has been 1.23-3.47 in the past year.  Her baseline seems to be someplace between 1.2 and 1.6 and she is stable at 1.41 at this time.  At her last visit she was very motivated to exercise and have been attending the gym regularly.  Unfortunately she has gotten away from the exercise to some degree but is still motivated to continue to make healthy lifestyle changes.  Her blood pressure is well controlled and she has no swelling concerns.  She is not using NSAIDs.    12/15/20: video visit. Creatinine is stable at this time. 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 Some back pain in the AM -she attributes this to her bed. BP has been good at recent visit. No lightheadedness unless blood sugar is low. No NSAIDs. Eating and drinking well. She had her teeth pulled at the end of May. She is getting new teeth in January so she is excited about that.     12/20/21: Divina is presenting for routine follow-up today. Her creatinine has been 1.20-1.35 over the last year. Her baseline seems to be 1.20-1.40, stable at 1.35 today. At her last visit (virtual) she was doing well with her diabetes (diet and exercise). Still enjoys working at the [employer]. No new concerns today, overall doing well. Was able to quit smoking for two months, but did start again. Plans  to stop in the future, encouraged her to continue with the cessation.       Allergies   Allergen Reactions     Acetaminophen Other (See Comments)     pt previously tried to overdose on it, PMD does not want pt taking per pt.      Latex Rash       ACCU-CHEK GUIDE test strip, USE TO TEST BLOOD SUGAR 3 TIMES DAILY (ACCU CHEK GUIDE).  amLODIPine (NORVASC) 10 MG tablet, TAKE ONE TABLET BY MOUTH DAILY  ARIPiprazole (ABILIFY) 30 MG tablet, Take 30 mg by mouth daily  atorvastatin (LIPITOR) 10 MG tablet, TAKE ONE TABLET BY MOUTH DAILY  BIOTIN FORTE 5 MG TABS, TAKE ONE TABLET BY MOUTH DAILY  bisacodyl (DULCOLAX) 10 MG suppository, Place 10 mg rectally   blood glucose (ACCU-CHEK GUIDE) test strip, TEST BLOOD SUGAR THREE TIMES DAILY  blood glucose monitoring (ACCU-CHEK CARLITOS PLUS) meter device kit, Use to test blood sugar three times daily or as directed.  blood glucose monitoring (ACCU-CHEK FASTCLIX) lancets, Use to test blood sugar 3 times daily  blood glucose monitoring (NO BRAND SPECIFIED) meter device kit, Use to test blood sugar 3 times daily or as directed.  Accu Chek Guide Meter.  calcium carbonate (TUMS) 500 MG chewable tablet, Take 1 chew tab by mouth 2 times daily  carvedilol (COREG) 6.25 MG tablet, TAKE ONE TABLET BY MOUTH TWICE DAILY  ciprofloxacin-dexamethasone (CIPRODEX) 0.3-0.1 % otic suspension, Place 5 drops in both ears twice daily for 14 days.  cloZAPine (CLOZARIL) 100 MG tablet, Take 100 mg by mouth 2 times daily   docusate sodium (DSS) 100 MG capsule,   fluocinolone acetonide 0.01 % OIL, Place 4 drops in ear(s) 2 times daily as needed (ear itching)  FLUoxetine (PROZAC) 10 MG capsule, Take 1 capsule (10 mg) by mouth daily Plus 20 mg capsule, total 30 mg daily  FLUoxetine (PROZAC) 20 MG capsule, Take 1 capsule (20 mg) by mouth daily  gabapentin (NEURONTIN) 300 MG capsule, 300 AM and 600 mg at bedtime  glucose (BD GLUCOSE) 4 g chewable tablet, Take 1 tablet by mouth every hour as needed for low blood  sugar  guaiFENesin (ROBITUSSIN) 100 MG/5ML SYRP, Take 5 mLs by mouth   insulin glargine (BASAGLAR KWIKPEN) 100 UNIT/ML pen, INJECT 25 UNITS SUB Q DAILY  lamoTRIgine (LAMICTAL) 100 MG tablet, Take 100 mg by mouth At Bedtime  lisinopril (ZESTRIL) 5 MG tablet, Take 1 tablet (5 mg) by mouth daily  loperamide (IMODIUM A-D) 2 MG tablet,   loratadine (CLARITIN) 10 MG tablet, TAKE ONE TABLET BY MOUTH EVERY MORNING  magnesium citrate solution, Take 296 mLs by mouth once  meclizine (ANTIVERT) 25 MG tablet, Take 1 tablet (25 mg) by mouth 3 times daily as needed for dizziness  montelukast (SINGULAIR) 10 MG tablet, TAKE ONE TABLET BY MOUTH AT BEDTIME  nicotine (NICODERM CQ) 14 MG/24HR 24 hr patch, Place 1 patch onto the skin every 24 hours  NOVOLOG FLEXPEN 100 UNIT/ML soln, INJECT 1 TO 5 UNITS SUB Q THREE TIMES DAILY WITH MEALS PER SLIDING SCALE  omeprazole (PRILOSEC) 20 MG DR capsule, TAKE ONE  CAPSULE BY MOUTH DAILY  OZEMPIC, 1 MG/DOSE, 2 MG/1.5ML pen, INJECT 1MG SUB-Q EVERY 7 DAYS (ON THURSDAYS)  OZEMPIC, 1 MG/DOSE, 4 MG/3ML SOPN, INJECT 1MG SUB-Q EVERY 7 DAYS (ON THURSDAYS)  polyethylene glycol (MIRALAX) 17 GM/Dose powder, Take 17 g by mouth   pramox-pe-glycerin-petrolatum (PREPARATION H) 1-0.25-14.4-15 % CREA cream,   QUEtiapine (SEROQUEL) 25 MG tablet, Take 25 mg by mouth   traZODone (DESYREL) 50 MG tablet, Take 50 mg by mouth   ULTICARE MINI 31G X 6 MM insulin pen needle, USE 1 PEN NEEDLE DAILY  VENTOLIN  (90 Base) MCG/ACT inhaler, INHALE 2 PUFFS INTO THE LUNGS EVERY SIX  HOURS AS NEEDED FOR SHORTNESS OF BREATH    No current facility-administered medications on file prior to visit.      Past Medical History:   Diagnosis Date     Acute pain of left knee 03/22/2019     Acute-on-chronic kidney injury (H) 03/22/2019     ARF (acute renal failure) (H) 03/23/2019     Cervical high risk HPV (human papillomavirus) test positive 06/20/2017     Deep venous thrombosis of arm (H) 1/6/2017    ultrasound 1/6/2017-  venous  thrombus in the antecubital fossa region and the left forearm as described     Depressive disorder, not elsewhere classified      DVT (deep venous thrombosis) (H)      Dysmetabolic syndrome X      Esophageal reflux     GERD     Mild intermittent asthma      Near syncope 2019     Other specified types of schizophrenia, unspecified condition      Pancreatic cancer (H)     left adrenal gland, partial pancreas, and spleen removed; no chemo or radiation.      Suicidal behavior 2020     Type II or unspecified type diabetes mellitus without mention of complication, not stated as uncontrolled        Past Surgical History:   Procedure Laterality Date     ADRENALECTOMY Left      ENT SURGERY  10/01/2000    Tympanoplasty     IR LIVER BIOPSY PERCUTANEOUS  2019     PANCREATECTOMY PARTIAL  2015     PERCUTANEOUS BIOPSY LIVER Right 2019    Procedure: Percutaneous Liver Biopsy;  Surgeon: Sean Guzman PA-C;  Location: [facility]     SPLENECTOMY       TONSILLECTOMY  10/01/2000    Tonsillectomy       Social History     Tobacco Use     Smoking status: Current Every Day Smoker     Packs/day: 1.00     Years: 4.00     Pack years: 4.00     Types: Cigarettes     Last attempt to quit: 2019     Years since quittin.4     Smokeless tobacco: Never Used     Tobacco comment: one pack every 3 to 4 days    Substance Use Topics     Alcohol use: No     Drug use: No       Family History   Problem Relation Age of Onset     Respiratory Mother         ASTHMA     Allergies Mother      Depression Mother      Heart Disease Father         HEART VALVE REPLACEMENT     Hypertension Father      Diabetes Father      Depression Father      Respiratory Brother      Allergies Brother      Respiratory Sister      Allergies Sister      Depression Sister      Respiratory Sister      Allergies Sister      Depression Sister      Kidney Disease No family hx of        ROS: A 4 system review of systems was negative other than  noted here or above.     Exam:  /72 (BP Location: Left arm, Patient Position: Sitting, Cuff Size: Adult Large)   Pulse 86   Wt 91.6 kg (202 lb)   SpO2 98%   BMI 35.78 kg/m    GENERAL: Healthy, alert and no distress  EYES: Eyes grossly normal to inspection.  No discharge or erythema, or obvious scleral/conjunctival abnormalities.  RESP: No audible wheeze, cough, or visible cyanosis.  No visible retractions or increased work of breathing.    SKIN: Visible skin clear. No significant rash, abnormal pigmentation or lesions.  NEURO: Cranial nerves grossly intact.  Mentation and speech appropriate for age.  PSYCH: Mentation appears normal, affect normal/bright, judgement and insight intact, normal speech and appearance well-groomed.       Results:   No visits with results within 1 Day(s) from this visit.   Latest known visit with results is:   Orders Only on 12/09/2020   Component Date Value Ref Range Status     Sodium 12/09/2020 142  133 - 144 mmol/L Final     Potassium 12/09/2020 4.5  3.4 - 5.3 mmol/L Final     Chloride 12/09/2020 110* 94 - 109 mmol/L Final     Carbon Dioxide 12/09/2020 27  20 - 32 mmol/L Final     Anion Gap 12/09/2020 5  3 - 14 mmol/L Final     Glucose 12/09/2020 112* 70 - 99 mg/dL Final    Non Fasting     Urea Nitrogen 12/09/2020 23  7 - 30 mg/dL Final     Creatinine 12/09/2020 1.37* 0.52 - 1.04 mg/dL Final     GFR Estimate 12/09/2020 50* >60 mL/min/[1.73_m2] Final    Comment: Non  GFR Calc  Starting 12/18/2018, serum creatinine based estimated GFR (eGFR) will be   calculated using the Chronic Kidney Disease Epidemiology Collaboration   (CKD-EPI) equation.       GFR Estimate If Black 12/09/2020 58* >60 mL/min/[1.73_m2] Final    Comment:  GFR Calc  Starting 12/18/2018, serum creatinine based estimated GFR (eGFR) will be   calculated using the Chronic Kidney Disease Epidemiology Collaboration   (CKD-EPI) equation.       Calcium 12/09/2020 9.0  8.5 - 10.1 mg/dL  Final     Phosphorus 12/09/2020 3.4  2.5 - 4.5 mg/dL Final     Albumin 12/09/2020 3.8  3.4 - 5.0 g/dL Final     Parathyroid Hormone Intact 12/09/2020 51  18 - 80 pg/mL Final     Hemoglobin 12/09/2020 13.0  11.7 - 15.7 g/dL Final     Protein Random Urine 12/09/2020 0.34  g/L Final     Protein Total Urine g/gr Creatinine 12/09/2020 0.14  0 - 0.2 g/g Cr Final     Creatinine Urine 12/09/2020 236  mg/dL Final          Assessment/Plan:   1. CKD Stage 3: risk factors for kidney disease include longstanding hypertension, diabetes, as well as longterm lithium use. She is now away from lithium which is great to see given she is already at risk for diabetic nephropathy and would like to minimize risk.  She is on a low-dose of lisinopril.  I have been hesitant to increase the dose too far given her hemodynamic variability seen in the past. Will continue to hold adding SGLT2-INH since she has not shown signs of proteinuria.    Plan is labs in 6 month and follow-up in one year.     2. DM: Her A1c at this time is 7.8%  - she reports good glucose levels more recently.     3. Bipolar: now off of lithium - she feels supported with her physicians treating her mental illness.     4. GERD: ideally would avoid PPI therapy given increased risk of incident CKD and AIN noted with PPI therapy. Tolerating once daily dosing - I am not certain we will be able to get away from PPI completely given the severity of her sxs previously.     5. Hypertension: Blood pressure is at goal of less than 130/80.  No changes are made today.    Anthony Stern MD  Internal Medicine Resident, PGY-1    Attending Note: I have seen and examined this patient and the above note reflects my historical findings, exam findings, and assessment and plan.   Patient Instructions   1. Labs in 6 months  2. Follow-up in 12 months     Sánchez Dias DO

## 2021-12-20 NOTE — NURSING NOTE
"Chief Complaint   Patient presents with     RECHECK       Initial /72 (BP Location: Left arm, Patient Position: Sitting, Cuff Size: Adult Large)   Pulse 86   Wt 91.6 kg (202 lb)   SpO2 98%   BMI 35.78 kg/m   Estimated body mass index is 35.78 kg/m  as calculated from the following:    Height as of 11/11/21: 1.6 m (5' 3\").    Weight as of this encounter: 91.6 kg (202 lb)..    She requests these members of her care team be copied on today's visit information:     PCP: Jaleesa Velasquez      Do you need any medication refills at today's visit? No    Tamara Davis MA   Email: [email or web address]  [facility] - Rheumatology  Phone: [phone]  Fax: [phone]      "

## 2021-12-22 ENCOUNTER — TRANSFERRED RECORDS (OUTPATIENT)
Dept: HEALTH INFORMATION MANAGEMENT | Facility: CLINIC | Age: 36
End: 2021-12-22
Payer: MEDICARE

## 2021-12-22 LAB — RETINOPATHY: NEGATIVE

## 2022-01-01 NOTE — PROGRESS NOTES
Clinic Care Coordination Contact  Care Team Conversations    Clinical Data: RN CC called to f/u with Lorin, caregiver at foster home. She was able to meet with [facility]. They did a one time assessment and verified that Lorin was setting up the pt's medications correctly. No further home care services were needed per their assessment. Lorin feels much better and less overwhelmed about being able to manage pt's medical needs. Pt has multiple apts set up for the next couple weeks to establish care with specialist.     New medications to review:  NA    Clinical Pathway Documentation: NA    Plan: RN CC will call and f/u with Lorin again in 4-6 weeks. Lorin encouraged to call RN CC sooner if questions or concerns arise.     Sobeida Greer RN-BSN  RN Clinic Care Coordinator  [facility]  [phone]                    
Clinic Care Coordination Contact  [facility]/Voicemail    Referral Source: PCP  Clinical Data: Care Coordinator Outreach  Outreach attempted x 1 - call to caregiverLorin.  Left message on voicemail with call back information and requested return call.  Plan: Care Coordinator. Care Coordinator will try to reach patient again in 3-5 business days.  Sobeida Greer, RN-BSN  RN Clinic Care Coordinator  [facility]  [phone]        
MD Office

## 2022-01-12 ENCOUNTER — OFFICE VISIT (OUTPATIENT)
Dept: FAMILY MEDICINE | Facility: CLINIC | Age: 37
End: 2022-01-12
Payer: MEDICARE

## 2022-01-12 ENCOUNTER — TELEPHONE (OUTPATIENT)
Dept: FAMILY MEDICINE | Facility: CLINIC | Age: 37
End: 2022-01-12

## 2022-01-12 VITALS
OXYGEN SATURATION: 98 % | HEIGHT: 63 IN | WEIGHT: 206 LBS | TEMPERATURE: 98.4 F | SYSTOLIC BLOOD PRESSURE: 132 MMHG | BODY MASS INDEX: 36.5 KG/M2 | HEART RATE: 85 BPM | DIASTOLIC BLOOD PRESSURE: 68 MMHG

## 2022-01-12 DIAGNOSIS — J45.20 MILD INTERMITTENT ASTHMA WITHOUT COMPLICATION: Chronic | ICD-10-CM

## 2022-01-12 DIAGNOSIS — E66.01 MORBID OBESITY (H): Chronic | ICD-10-CM

## 2022-01-12 DIAGNOSIS — R01.1 HEART MURMUR: ICD-10-CM

## 2022-01-12 DIAGNOSIS — R60.0 BILATERAL LEG EDEMA: ICD-10-CM

## 2022-01-12 DIAGNOSIS — E11.42 DIABETIC POLYNEUROPATHY ASSOCIATED WITH TYPE 2 DIABETES MELLITUS (H): ICD-10-CM

## 2022-01-12 LAB
ALBUMIN SERPL-MCNC: 3.1 G/DL (ref 3.4–5)
ALP SERPL-CCNC: 189 U/L (ref 40–150)
ALT SERPL W P-5'-P-CCNC: 56 U/L (ref 0–50)
ANION GAP SERPL CALCULATED.3IONS-SCNC: 7 MMOL/L (ref 3–14)
AST SERPL W P-5'-P-CCNC: 24 U/L (ref 0–45)
BILIRUB SERPL-MCNC: 0.2 MG/DL (ref 0.2–1.3)
BUN SERPL-MCNC: 20 MG/DL (ref 7–30)
CALCIUM SERPL-MCNC: 8.9 MG/DL (ref 8.5–10.1)
CHLORIDE BLD-SCNC: 109 MMOL/L (ref 94–109)
CO2 SERPL-SCNC: 25 MMOL/L (ref 20–32)
CREAT SERPL-MCNC: 1.36 MG/DL (ref 0.52–1.04)
CREAT UR-MCNC: 129 MG/DL
GFR SERPL CREATININE-BSD FRML MDRD: 52 ML/MIN/1.73M2
GLUCOSE BLD-MCNC: 186 MG/DL (ref 70–99)
HBA1C MFR BLD: 8.2 % (ref 0–5.6)
MICROALBUMIN UR-MCNC: 100 MG/L
MICROALBUMIN/CREAT UR: 77.52 MG/G CR (ref 0–25)
POTASSIUM BLD-SCNC: 4.6 MMOL/L (ref 3.4–5.3)
PROT SERPL-MCNC: 8.3 G/DL (ref 6.8–8.8)
SODIUM SERPL-SCNC: 141 MMOL/L (ref 133–144)
TSH SERPL DL<=0.005 MIU/L-ACNC: 0.72 MU/L (ref 0.4–4)

## 2022-01-12 PROCEDURE — 82043 UR ALBUMIN QUANTITATIVE: CPT | Performed by: NURSE PRACTITIONER

## 2022-01-12 PROCEDURE — 36415 COLL VENOUS BLD VENIPUNCTURE: CPT | Performed by: NURSE PRACTITIONER

## 2022-01-12 PROCEDURE — 80053 COMPREHEN METABOLIC PANEL: CPT | Performed by: NURSE PRACTITIONER

## 2022-01-12 PROCEDURE — 99214 OFFICE O/P EST MOD 30 MIN: CPT | Performed by: NURSE PRACTITIONER

## 2022-01-12 PROCEDURE — 84443 ASSAY THYROID STIM HORMONE: CPT | Performed by: NURSE PRACTITIONER

## 2022-01-12 PROCEDURE — 83036 HEMOGLOBIN GLYCOSYLATED A1C: CPT | Performed by: NURSE PRACTITIONER

## 2022-01-12 RX ORDER — NYSTATIN 100000 U/G
OINTMENT TOPICAL
COMMUNITY
End: 2022-10-27

## 2022-01-12 RX ORDER — PROCHLORPERAZINE 25 MG/1
1 SUPPOSITORY RECTAL ONCE
Qty: 1 EACH | Refills: 0 | Status: SHIPPED | OUTPATIENT
Start: 2022-01-12 | End: 2022-01-14

## 2022-01-12 RX ORDER — PROCHLORPERAZINE 25 MG/1
1 SUPPOSITORY RECTAL
Qty: 3 EACH | Refills: 5 | Status: SHIPPED | OUTPATIENT
Start: 2022-01-12 | End: 2022-01-13

## 2022-01-12 RX ORDER — PREGABALIN 50 MG/1
50 CAPSULE ORAL 2 TIMES DAILY
Qty: 60 CAPSULE | Refills: 1 | Status: SHIPPED | OUTPATIENT
Start: 2022-01-12 | End: 2022-02-18

## 2022-01-12 RX ORDER — ACETAMINOPHEN 325 MG/1
650 TABLET ORAL EVERY 4 HOURS PRN
COMMUNITY

## 2022-01-12 RX ORDER — FLUOXETINE 40 MG/1
40 CAPSULE ORAL DAILY
COMMUNITY

## 2022-01-12 RX ORDER — PROCHLORPERAZINE 25 MG/1
1 SUPPOSITORY RECTAL
Qty: 1 EACH | Refills: 1 | Status: SHIPPED | OUTPATIENT
Start: 2022-01-12 | End: 2022-01-14

## 2022-01-12 ASSESSMENT — MIFFLIN-ST. JEOR: SCORE: 1598.54

## 2022-01-12 NOTE — PROGRESS NOTES
Assessment & Plan     Uncontrolled type 2 diabetes mellitus with diabetic polyneuropathy, with long-term current use of insulin (H)  -UNCONTROLLED, RECOMMENDED TO WORK ON WEIGHT LOSS, FOLLOW DIABETIC DIET CLOSELY, if glucose readings still stay above 180-200 follow up with diabetic educator   - Hemoglobin A1c; Future  - Comprehensive metabolic panel (BMP + Alb, Alk Phos, ALT, AST, Total. Bili, TP); Future  - Albumin Random Urine Quantitative with Creat Ratio; Future  - Continuous Blood Gluc  (DEXCOM G6 ) VANE; 1 each once for 1 dose Use to read blood sugars as per 's instructions.  - Continuous Blood Gluc Transmit (DEXCOM G6 TRANSMITTER) MISC; 1 each every 3 months Change every 3 months.  - Continuous Blood Gluc Sensor (DEXCOM G6 SENSOR) MISC; 1 each every 10 days Change every 10 days.  - Hemoglobin A1c  - Comprehensive metabolic panel (BMP + Alb, Alk Phos, ALT, AST, Total. Bili, TP)  - Albumin Random Urine Quantitative with Creat Ratio  - REVIEW OF HEALTH MAINTENANCE PROTOCOL ORDERS    Bilateral leg edema    - Comprehensive metabolic panel (BMP + Alb, Alk Phos, ALT, AST, Total. Bili, TP); Future  - Compression Sleeve/Stocking Order for DME - ONLY FOR DME  - Echocardiogram Complete; Future  - TSH with free T4 reflex; Future  - Comprehensive metabolic panel (BMP + Alb, Alk Phos, ALT, AST, Total. Bili, TP)  - TSH with free T4 reflex  - furosemide (LASIX) 20 MG tablet; Take 1 tablet (20 mg) by mouth daily for 5 days    Morbid obesity (H)    - REVIEW OF HEALTH MAINTENANCE PROTOCOL ORDERS    Heart murmur    - Echocardiogram Complete; Future    Diabetic polyneuropathy associated with type 2 diabetes mellitus (H)  -no improvement with Gabapentin, recommended to start Lyrica   - pregabalin (LYRICA) 50 MG capsule; Take 1 capsule (50 mg) by mouth 2 times daily    Mild intermittent asthma without complication  -well controlled           See Patient Instructions    Return in about 4 weeks (around  2/9/2022) for Routine Visit.    VERONIQUE Spears CNP  [facility] GRACIE Murcia is a 36 year old who presents for the following health issues   HPI   Chief Complaint   Patient presents with     Diabetes     follow up wants to discuss Dexcom      Musculoskeletal Problem     Gabapentin not helping for feet     Edema     swelling in feet, legs , ankles x 2 days          Diabetes Follow-up    How often are you checking your blood sugar? Three times daily  Blood sugar testing frequency justification:  Adjustment of medication(s)  What time of day are you checking your blood sugars (select all that apply)?  Before meals  Have you had any blood sugars above 200?  Yes a few- has readings with her today   Have you had any blood sugars below 70?  No    What symptoms do you notice when your blood sugar is low?  Shaky and Dizzy    What concerns do you have today about your diabetes? None and Other: seh would like to discuss getting the DexCom ordered      Do you have any of these symptoms? (Select all that apply)  Pain in Feet, Gabapentin not working       BP Readings from Last 2 Encounters:   01/12/22 132/68   12/20/21 115/72     Hemoglobin A1C POCT (%)   Date Value   07/08/2021 7.4 (H)   03/26/2021 8.0 (H)     Hemoglobin A1C (%)   Date Value   01/12/2022 8.2 (H)   10/28/2021 7.8 (H)     LDL Cholesterol Calculated (mg/dL)   Date Value   03/26/2021 72   01/02/2020 78           How many servings of fruits and vegetables do you eat daily?  2-3    On average, how many sweetened beverages do you drink each day (Examples: soda, juice, sweet tea, etc.  Do NOT count diet or artificially sweetened beverages)?   2    How many days per week do you exercise enough to make your heart beat faster? 0    How many minutes a day do you exercise enough to make your heart beat faster?0    How many days per week do you miss taking your medication? 0    Concern - swelling in lower legs, ankles,feet   Onset: x 2  "days   Description: swelling   Intensity: severe  Progression of Symptoms:  same  Accompanying Signs & Symptoms: can be painful   Previous history of similar problem: none   Precipitating factors:        Worsened by: being on her feet   Alleviating factors:        Improved by: none   Therapies tried and outcome: rest/elevation no relief       Review of Systems   Constitutional, HEENT, cardiovascular, pulmonary, gi and gu systems are negative, except as otherwise noted.      Objective    /68 (BP Location: Left arm, Patient Position: Chair, Cuff Size: Adult Regular)   Pulse 85   Temp 98.4  F (36.9  C) (Tympanic)   Ht 1.6 m (5' 3\")   Wt 93.4 kg (206 lb)   SpO2 98%   BMI 36.49 kg/m    Body mass index is 36.49 kg/m .  Physical Exam   GENERAL: healthy, alert and no distress  EYES: Eyes grossly normal to inspection, PERRL and conjunctivae and sclerae normal  RESP: lungs clear to auscultation - no rales, rhonchi or wheezes  CV: regular rates and rhythm, normal S1 S2, no S3 or S4, mild systolic  murmur heard best over the apex, peripheral pulses strong and 2+ bilateral lower extremity pitting edema to ankles, feet    MS: no gross musculoskeletal defects noted, no edema  SKIN: no suspicious lesions or rashes  NEURO: Normal strength and tone, mentation intact and speech normal  PSYCH: mentation appears normal, affect normal/bright    Results for orders placed or performed in visit on 01/12/22 (from the past 24 hour(s))   Hemoglobin A1c   Result Value Ref Range    Hemoglobin A1C 8.2 (H) 0.0 - 5.6 %   Comprehensive metabolic panel (BMP + Alb, Alk Phos, ALT, AST, Total. Bili, TP)   Result Value Ref Range    Sodium 141 133 - 144 mmol/L    Potassium 4.6 3.4 - 5.3 mmol/L    Chloride 109 94 - 109 mmol/L    Carbon Dioxide (CO2) 25 20 - 32 mmol/L    Anion Gap 7 3 - 14 mmol/L    Urea Nitrogen 20 7 - 30 mg/dL    Creatinine 1.36 (H) 0.52 - 1.04 mg/dL    Calcium 8.9 8.5 - 10.1 mg/dL    Glucose 186 (H) 70 - 99 mg/dL    Alkaline " Phosphatase 189 (H) 40 - 150 U/L    AST 24 0 - 45 U/L    ALT 56 (H) 0 - 50 U/L    Protein Total 8.3 6.8 - 8.8 g/dL    Albumin 3.1 (L) 3.4 - 5.0 g/dL    Bilirubin Total 0.2 0.2 - 1.3 mg/dL    GFR Estimate 52 (L) >60 mL/min/1.73m2   Albumin Random Urine Quantitative with Creat Ratio   Result Value Ref Range    Creatinine Urine mg/dL 129 mg/dL    Albumin Urine mg/L 100 mg/L    Albumin Urine mg/g Cr 77.52 (H) 0.00 - 25.00 mg/g Cr   TSH with free T4 reflex   Result Value Ref Range    TSH 0.72 0.40 - 4.00 mU/L

## 2022-01-12 NOTE — TELEPHONE ENCOUNTER
Group home called stating that pregablin 50mg needs a PA,   [facility].     Insurance [facility]: id: [id number]          Juanita CACERES  Station

## 2022-01-13 DIAGNOSIS — N18.30 TYPE 2 DIABETES MELLITUS WITH STAGE 3 CHRONIC KIDNEY DISEASE, WITH LONG-TERM CURRENT USE OF INSULIN (H): ICD-10-CM

## 2022-01-13 DIAGNOSIS — E11.22 TYPE 2 DIABETES MELLITUS WITH STAGE 3 CHRONIC KIDNEY DISEASE, WITH LONG-TERM CURRENT USE OF INSULIN (H): ICD-10-CM

## 2022-01-13 DIAGNOSIS — Z79.4 TYPE 2 DIABETES MELLITUS WITH STAGE 3 CHRONIC KIDNEY DISEASE, WITH LONG-TERM CURRENT USE OF INSULIN (H): ICD-10-CM

## 2022-01-13 RX ORDER — FUROSEMIDE 20 MG
20 TABLET ORAL DAILY
Qty: 5 TABLET | Refills: 0 | Status: SHIPPED | OUTPATIENT
Start: 2022-01-13 | End: 2022-02-02

## 2022-01-13 ASSESSMENT — ASTHMA QUESTIONNAIRES
QUESTION_2 LAST FOUR WEEKS HOW OFTEN HAVE YOU HAD SHORTNESS OF BREATH: ONCE OR TWICE A WEEK
QUESTION_5 LAST FOUR WEEKS HOW WOULD YOU RATE YOUR ASTHMA CONTROL: WELL CONTROLLED
QUESTION_3 LAST FOUR WEEKS HOW OFTEN DID YOUR ASTHMA SYMPTOMS (WHEEZING, COUGHING, SHORTNESS OF BREATH, CHEST TIGHTNESS OR PAIN) WAKE YOU UP AT NIGHT OR EARLIER THAN USUAL IN THE MORNING: NOT AT ALL
QUESTION_4 LAST FOUR WEEKS HOW OFTEN HAVE YOU USED YOUR RESCUE INHALER OR NEBULIZER MEDICATION (SUCH AS ALBUTEROL): ONCE A WEEK OR LESS
ACT_TOTALSCORE: 22
QUESTION_1 LAST FOUR WEEKS HOW MUCH OF THE TIME DID YOUR ASTHMA KEEP YOU FROM GETTING AS MUCH DONE AT WORK, SCHOOL OR AT HOME: NONE OF THE TIME

## 2022-01-13 NOTE — TELEPHONE ENCOUNTER
"Please send new Rx's for Dexcom G6 / Transmitter/ Sensors   Please write Rx with these specifications for Medicare compliance      Dexcom G6   Qty: 1  Refills: 0  Sig \"USE TO READ BLOOD SUGARS PER  INSTRUCTIONS\"    Dexcom G6 Sensors  Qty: 9  Refills: Can have up to 1 additional refill  Sig \"CHANGE EVERY 10 DAYS\"    Dexcom G6 Transmitter:  Qty:1  Refills: can have 1 additional refill  Sig \"CHANGE EVERY 90 DAYS\"    Please call [phone] and speak to one of our Durable Medical Equipment Team members if you have any questions.    **Unable to transfer from other pharmacy due to Medicare B billing**  "

## 2022-01-14 ENCOUNTER — TELEPHONE (OUTPATIENT)
Dept: FAMILY MEDICINE | Facility: CLINIC | Age: 37
End: 2022-01-14
Payer: MEDICARE

## 2022-01-14 DIAGNOSIS — E11.22 TYPE 2 DIABETES MELLITUS WITH STAGE 3 CHRONIC KIDNEY DISEASE, WITH LONG-TERM CURRENT USE OF INSULIN, UNSPECIFIED WHETHER STAGE 3A OR 3B CKD (H): ICD-10-CM

## 2022-01-14 DIAGNOSIS — Z79.4 TYPE 2 DIABETES MELLITUS WITH STAGE 3 CHRONIC KIDNEY DISEASE, WITH LONG-TERM CURRENT USE OF INSULIN (H): ICD-10-CM

## 2022-01-14 DIAGNOSIS — N18.30 TYPE 2 DIABETES MELLITUS WITH STAGE 3 CHRONIC KIDNEY DISEASE, WITH LONG-TERM CURRENT USE OF INSULIN (H): ICD-10-CM

## 2022-01-14 DIAGNOSIS — Z79.4 TYPE 2 DIABETES MELLITUS WITH STAGE 3 CHRONIC KIDNEY DISEASE, WITH LONG-TERM CURRENT USE OF INSULIN, UNSPECIFIED WHETHER STAGE 3A OR 3B CKD (H): ICD-10-CM

## 2022-01-14 DIAGNOSIS — E11.22 TYPE 2 DIABETES MELLITUS WITH STAGE 3 CHRONIC KIDNEY DISEASE, WITH LONG-TERM CURRENT USE OF INSULIN (H): ICD-10-CM

## 2022-01-14 DIAGNOSIS — N18.30 TYPE 2 DIABETES MELLITUS WITH STAGE 3 CHRONIC KIDNEY DISEASE, WITH LONG-TERM CURRENT USE OF INSULIN, UNSPECIFIED WHETHER STAGE 3A OR 3B CKD (H): ICD-10-CM

## 2022-01-14 RX ORDER — PROCHLORPERAZINE 25 MG/1
1 SUPPOSITORY RECTAL ONCE
Qty: 1 EACH | Refills: 0 | Status: SHIPPED | OUTPATIENT
Start: 2022-01-14 | End: 2022-01-14

## 2022-01-14 RX ORDER — LANCETS
EACH MISCELLANEOUS
Qty: 102 EACH | Refills: 3 | Status: SHIPPED | OUTPATIENT
Start: 2022-01-14 | End: 2022-11-28

## 2022-01-14 RX ORDER — BLOOD SUGAR DIAGNOSTIC
STRIP MISCELLANEOUS
Qty: 150 STRIP | Refills: 2 | Status: CANCELLED | OUTPATIENT
Start: 2022-01-14

## 2022-01-14 RX ORDER — PROCHLORPERAZINE 25 MG/1
1 SUPPOSITORY RECTAL
Qty: 9 EACH | Refills: 1 | Status: SHIPPED | OUTPATIENT
Start: 2022-01-14 | End: 2022-02-03

## 2022-01-14 RX ORDER — BLOOD SUGAR DIAGNOSTIC
STRIP MISCELLANEOUS
Qty: 100 STRIP | Refills: 1 | Status: SHIPPED | OUTPATIENT
Start: 2022-01-14 | End: 2023-01-11

## 2022-01-14 RX ORDER — PROCHLORPERAZINE 25 MG/1
1 SUPPOSITORY RECTAL
Qty: 1 EACH | Refills: 1 | Status: SHIPPED | OUTPATIENT
Start: 2022-01-14 | End: 2022-02-03

## 2022-01-14 ASSESSMENT — ASTHMA QUESTIONNAIRES: ACT_TOTALSCORE: 22

## 2022-01-14 NOTE — TELEPHONE ENCOUNTER
Diabetes Education Scheduling Outreach #1:    Call to patient to schedule. Patient declined to schedule and will call us back when she's available.    Jessica Olvera OnCall  Diabetes and Nutrition Scheduling

## 2022-01-14 NOTE — TELEPHONE ENCOUNTER
Refill approved by covering provider/Dr. Noland. Olivia, group home , notified.    Sapphire Bejarano RN  [facility]

## 2022-01-14 NOTE — TELEPHONE ENCOUNTER
Olivia,  from patient's group home, calls with request for refills of patients accucheck testing strips. Reports that the incorrect item was sent to the pharmacy with a previous request, and that the patient needs these ASAP as she's leaving for a trip at 3pm, and only has one strip remaining. Requesting to have them sent to [facility]. Can call group home back at [phone].    Sapphire Bejarano RN  [facility]

## 2022-01-16 NOTE — TELEPHONE ENCOUNTER
Central Prior Authorization Team   Phone: [phone]    PA Initiation    Medication: pregablin  Insurance Company: [employer] Part D - Phone [phone] Fax [phone]  Pharmacy Filling the Rx: [facility] - [place], [place] - [address]  Filling Pharmacy Phone: [phone]  Filling Pharmacy Fax: [phone]  Start Date: 1/16/2022

## 2022-01-17 ENCOUNTER — TELEPHONE (OUTPATIENT)
Dept: FAMILY MEDICINE | Facility: CLINIC | Age: 37
End: 2022-01-17
Payer: MEDICARE

## 2022-01-17 DIAGNOSIS — E11.22 TYPE 2 DIABETES MELLITUS WITH STAGE 3A CHRONIC KIDNEY DISEASE, WITH LONG-TERM CURRENT USE OF INSULIN (H): ICD-10-CM

## 2022-01-17 DIAGNOSIS — N18.31 TYPE 2 DIABETES MELLITUS WITH STAGE 3A CHRONIC KIDNEY DISEASE, WITH LONG-TERM CURRENT USE OF INSULIN (H): ICD-10-CM

## 2022-01-17 DIAGNOSIS — Z79.4 TYPE 2 DIABETES MELLITUS WITH STAGE 3A CHRONIC KIDNEY DISEASE, WITH LONG-TERM CURRENT USE OF INSULIN (H): ICD-10-CM

## 2022-01-17 NOTE — TELEPHONE ENCOUNTER
We received an order for test strips last week from provider Ludin for this patient. In order to get covered, and be Medicare B Compliant, this needs to come from primary, .     Thanks,   Shirley Montiel

## 2022-01-17 NOTE — TELEPHONE ENCOUNTER
Prior Authorization Approval    Authorization Effective Date: 1/1/2022  Authorization Expiration Date: 1/17/2023  Medication: pregablin-APPROVED  Approved Dose/Quantity:    Reference #:     Insurance Company: [employer] Part D - Phone [phone] Fax [phone]  Expected CoPay:       CoPay Card Available:      Foundation Assistance Needed:    Which Pharmacy is filling the prescription (Not needed for infusion/clinic administered): [facility] - [place], [place] - [address] [place] [address]  Pharmacy Notified: Yes  Patient Notified: Yes  **Instructed pharmacy to notify patient when script is ready to /ship.**

## 2022-01-18 RX ORDER — BLOOD SUGAR DIAGNOSTIC
STRIP MISCELLANEOUS
Qty: 150 STRIP | Refills: 2 | Status: SHIPPED | OUTPATIENT
Start: 2022-01-18 | End: 2022-05-17

## 2022-01-18 NOTE — PROGRESS NOTES
Chief Complaint   Patient presents with     Ear Problem     Check ears- 6 month follow up- ears are draining issues for about 2 months/hx of granuloma of left ear membrane     History of Present Illness  Divina Delgadillo is a 36 year old female presents today for follow-up.  I last evaluated the patient on 7/23/2021.  The patient was having issues with granulation and drainage.  I treated her with a longer course of steroid drops.  When she was last seen in July 2021, she had resolution of the granulation.  She does have some retraction of the tympanic membrane but no deep retraction pockets or evidence of cholesteatoma.  She returns today for follow-up.      Since last seeing the patient, the patient reports that she did well for quite some time.  Over the past month or 6 weeks, she has had increasing ear plugging, moisture, drainage, decreased hearing, and some discomfort.  She does intermittently have some problems of dizziness and imbalance usually during the day.  She has had ear tubes as a child but no other recent ear surgery.  No significant noise exposure history, heavy machinery, farm equipment, firearm use.  She does have intermittent tinnitus.     I did review her temporal bone CT from 11/26/2018.  There was a small soft tissue density in right Prussic's space with some significant retraction of the right tympanic membrane.  There did not really appear to be any significant erosion of the scutum.  There is opacification of the right mastoid air cells.  On the left, the tympanic membrane was severely retracted.  There is no middle ear opacification, opacification of thoracic space, or scutal erosion.  The mastoid was under aerated on the left.     Past Medical History  Patient Active Problem List   Diagnosis     Esophageal reflux     NAFL (nonalcoholic fatty liver)     Essential hypertension     Hyperlipidemia LDL goal <100     Stage 3 chronic kidney disease     Mucinous neoplasm of pancreas     Type 2  diabetes mellitus with stage 3 chronic kidney disease, with long-term current use of insulin (H)     Bipolar disorder (H)     Cervical high risk HPV (human papillomavirus) test positive     IUD (intrauterine device) in place     Morbid obesity (H)     Mild intermittent asthma with acute exacerbation     Nicotine abuse     Chronic leukocytosis     Acquired asplenia     Borderline personality disorder (H)     Asthma     PTSD (post-traumatic stress disorder)     Long term (current) use of anticoagulants     Orthostatic hypotension     Tobacco abuse     Vitamin D insufficiency     Depressive disorder     Schizoaffective disorder, depressive type (H)     Uncontrolled type 2 diabetes mellitus with diabetic polyneuropathy, with long-term current use of insulin (H)     Cardiac murmur     Type 2 diabetes mellitus (H)     History of DVT of arm  (deep vein thrombosis)     Insomnia, unspecified type     Current Medications    Current Outpatient Medications:      ACCU-CHEK GUIDE test strip, Use to test blood sugar 3 times daily or as directed., Disp: 150 strip, Rfl: 2     acetaminophen (TYLENOL) 325 MG tablet, Take 325-650 mg by mouth 2 tab every 4-6 hours as needed, do not exceed 9 tabs in 24hours, Disp: , Rfl:      amLODIPine (NORVASC) 10 MG tablet, TAKE ONE TABLET BY MOUTH DAILY, Disp: 28 tablet, Rfl: 4     ARIPiprazole (ABILIFY) 30 MG tablet, Take 30 mg by mouth daily, Disp: , Rfl:      atorvastatin (LIPITOR) 10 MG tablet, TAKE ONE TABLET BY MOUTH DAILY, Disp: 28 tablet, Rfl: 10     BIOTIN FORTE 5 MG TABS, TAKE ONE TABLET BY MOUTH DAILY, Disp: 60 tablet, Rfl: 10     bisacodyl (DULCOLAX) 10 MG suppository, Place 10 mg rectally , Disp: , Rfl:      blood glucose (ACCU-CHEK GUIDE) test strip, TEST BLOOD SUGAR THREE TIMES DAILY, Disp: 100 strip, Rfl: 1     blood glucose monitoring (ACCU-CHEK CARLITOS PLUS) meter device kit, Use to test blood sugar three times daily or as directed., Disp: 1 kit, Rfl: 0     blood glucose monitoring  (ACCU-CHEK FASTCLIX) lancets, Use to test blood sugar 3 times daily, Disp: 102 each, Rfl: 3     blood glucose monitoring (NO BRAND SPECIFIED) meter device kit, Use to test blood sugar 3 times daily or as directed.  Accu Chek Guide Meter., Disp: 1 kit, Rfl: 1     calcium carbonate (TUMS) 500 MG chewable tablet, Take 1 chew tab by mouth 2 times daily, Disp: , Rfl:      carvedilol (COREG) 6.25 MG tablet, TAKE ONE TABLET BY MOUTH TWICE DAILY, Disp: 56 tablet, Rfl: 4     cloZAPine (CLOZARIL) 100 MG tablet, Take 100 mg by mouth 2 times daily , Disp: , Rfl:      Continuous Blood Gluc Sensor (DEXCOM G6 SENSOR) MISC, 1 each every 10 days Change every 10 days., Disp: 9 each, Rfl: 1     Continuous Blood Gluc Transmit (DEXCOM G6 TRANSMITTER) MISC, 1 each every 3 months Change every 3 months., Disp: 1 each, Rfl: 1     docusate sodium (DSS) 100 MG capsule, daily as needed , Disp: , Rfl:      fluocinolone acetonide 0.01 % OIL, Place 4 drops in ear(s) 2 times daily as needed (ear itching), Disp: 20 mL, Rfl: 3     FLUoxetine (PROZAC) 40 MG capsule, Take 40 mg by mouth daily, Disp: , Rfl:      glucose (BD GLUCOSE) 4 g chewable tablet, Take 1 tablet by mouth every hour as needed for low blood sugar, Disp: 30 tablet, Rfl: 4     guaiFENesin (ROBITUSSIN) 100 MG/5ML SYRP, Take 5 mLs by mouth 2 tsp every 4 hours as needed for cough, Disp: , Rfl:      insulin glargine (BASAGLAR KWIKPEN) 100 UNIT/ML pen, INJECT 25 UNITS SUB Q DAILY, Disp: 9 mL, Rfl: 10     lamoTRIgine (LAMICTAL) 100 MG tablet, Take 100 mg by mouth At Bedtime, Disp: , Rfl:      lisinopril (ZESTRIL) 5 MG tablet, Take 1 tablet (5 mg) by mouth daily, Disp: 30 tablet, Rfl: 3     loperamide (IMODIUM A-D) 2 MG tablet, 2 caplets after loose stool then 1 after each loose stool do not exceed 4 in 24hrs, Disp: , Rfl:      loratadine (CLARITIN) 10 MG tablet, TAKE ONE TABLET BY MOUTH EVERY MORNING, Disp: 28 tablet, Rfl: 10     magnesium citrate solution, Take 296 mLs by mouth once, Disp:  , Rfl:      montelukast (SINGULAIR) 10 MG tablet, TAKE ONE TABLET BY MOUTH AT BEDTIME, Disp: 28 tablet, Rfl: 4     NOVOLOG FLEXPEN 100 UNIT/ML soln, INJECT 1 TO 5 UNITS SUB Q THREE TIMES DAILY WITH MEALS PER SLIDING SCALE, Disp: 9 mL, Rfl: 10     nystatin (MYCOSTATIN) 893349 UNIT/GM external ointment, Apply liberally to corners of mouth 3x daily until seen, Disp: , Rfl:      NYSTATIN PO, Oral suspension 4-6ml rinse for 2 minutes then swallow 2 x daily, Disp: , Rfl:      omeprazole (PRILOSEC) 20 MG DR capsule, TAKE ONE  CAPSULE BY MOUTH DAILY, Disp: 28 capsule, Rfl: 10     OZEMPIC, 1 MG/DOSE, 4 MG/3ML SOPN, INJECT 1MG SUB-Q EVERY 7 DAYS (ON THURSDAYS), Disp: 3 mL, Rfl: 1     polyethylene glycol (MIRALAX) 17 GM/Dose powder, Take 17 g by mouth , Disp: , Rfl:      pramox-pe-glycerin-petrolatum (PREPARATION H) 1-0.25-14.4-15 % CREA cream, , Disp: , Rfl:      pregabalin (LYRICA) 50 MG capsule, Take 1 capsule (50 mg) by mouth 2 times daily, Disp: 60 capsule, Rfl: 1     QUEtiapine (SEROQUEL) 25 MG tablet, Take 25 mg by mouth 1 tablet 4 times daily as needed for agitation or hallucinations, Disp: , Rfl:      traZODone (DESYREL) 50 MG tablet, Take 50 mg by mouth nightly as needed , Disp: , Rfl:      ULTICARE MINI 31G X 6 MM insulin pen needle, USE 1 PEN NEEDLE DAILY, Disp: 100 each, Rfl: 10     VENTOLIN  (90 Base) MCG/ACT inhaler, INHALE 2 PUFFS INTO THE LUNGS EVERY SIX  HOURS AS NEEDED FOR SHORTNESS OF BREATH, Disp: 18 g, Rfl: 10     furosemide (LASIX) 20 MG tablet, Take 1 tablet (20 mg) by mouth daily for 5 days, Disp: 5 tablet, Rfl: 0     meclizine (ANTIVERT) 25 MG tablet, Take 1 tablet (25 mg) by mouth 3 times daily as needed for dizziness (Patient not taking: Reported on 1/20/2022), Disp: 30 tablet, Rfl: 0    Allergies  Allergies   Allergen Reactions     Acetaminophen Other (See Comments)     pt previously tried to overdose on it, PMD does not want pt taking per pt.      Latex Rash       Social History  Social  History     Socioeconomic History     Marital status: Single     Spouse name: Not on file     Number of children: 0     Years of education: Not on file     Highest education level: Not on file   Occupational History     Not on file   Tobacco Use     Smoking status: Former Smoker     Packs/day: 1.00     Years: 4.00     Pack years: 4.00     Types: Cigarettes     Quit date: 2019     Years since quittin.5     Smokeless tobacco: Never Used     Tobacco comment: one pack every 3 to 4 days    Vaping Use     Vaping Use: Never used   Substance and Sexual Activity     Alcohol use: No     Drug use: No     Sexual activity: Not Currently     Partners: Female     Birth control/protection: I.U.D.     Comment: Both male and female    Other Topics Concern     Parent/sibling w/ CABG, MI or angioplasty before 65F 55M? No   Social History Narrative     Not on file     Social Determinants of Health     Financial Resource Strain: Not on file   Food Insecurity: Not on file   Transportation Needs: Not on file   Physical Activity: Not on file   Stress: Not on file   Social Connections: Not on file   Intimate Partner Violence: Not on file   Housing Stability: Not on file       Family History  Family History   Problem Relation Age of Onset     Respiratory Mother         ASTHMA     Allergies Mother      Depression Mother      Heart Disease Father         HEART VALVE REPLACEMENT     Hypertension Father      Diabetes Father      Depression Father      Respiratory Brother      Allergies Brother      Respiratory Sister      Allergies Sister      Depression Sister      Respiratory Sister      Allergies Sister      Depression Sister      Kidney Disease No family hx of        Review of Systems  As per HPI and PMHx, otherwise 10 system review including the head and neck, constitutional, eyes, respiratory, GI, skin, neurologic, lymphatic, endocrine, and allergy systems is negative.    Physical Exam  BP (!) 142/71 (BP Location: Right arm,  "Patient Position: Sitting, Cuff Size: Adult Regular)   Pulse 90   Temp 99.3  F (37.4  C) (Tympanic)   Ht 1.6 m (5' 3\")   Wt 93.4 kg (206 lb)   BMI 36.49 kg/m    GENERAL: Patient is a pleasant, cooperative 36 year old female in no acute distress.  HEAD: Normocephalic, atraumatic.  Hair and scalp are normal.  EYES: Pupils are equal, round, reactive to light and accommodation.  Extraocular movements are intact.  The sclera nonicteric without injection.  The extraocular structures are normal.  EARS: See below.   NEUROLOGIC: Cranial nerves II through XII are grossly intact.  Voice is strong.  Facial nerve examination incomplete due to the patient wearing a mask.  CARDIOVASCULAR: Extremities are warm and well-perfused.  No significant peripheral edema.  RESPIRATORY: Patient has nonlabored breathing without cough, wheeze, stridor.  PSYCHIATRIC: Patient is alert and oriented.  Mood and affect appear normal.  SKIN: Warm and dry.  No scalp, face, or neck lesions noted.     Physical Exam and Procedure     EARS: Normal shape and symmetry.  No tenderness when palpating the mastoid or tragal areas bilaterally.  The ears were then examined under the otic binocular microscope to perform cerumen removal.  Otoscopic exam on the right reveals moist ceruminous debris and otorrhea impacted down to the level of the tympanic membrane.  There is granulation of the tympanic membrane centrally.  The ear was debrided with a #3 and #5 suction.  Some crusting was removed with an alligator forceps. The right tympanic membrane is intact with severe retraction onto the middle ear structures.  There are no retraction pockets or evidence of cholesteatoma.       Attention was turned to the left ear.  Otoscopic exam on the left reveals impacted moist ceruminous debris and otorrhea down to the level of the tympanic membrane.  There is granulation at the posterior margin of the annulus.  There is debrided with a #3 #5 suction.  Some crusting was " removed with an alligator forceps.  The left tympanic membrane is intact with severe retraction anteriorly onto the middle ear structures.  There are no retraction pockets or evidence of cholesteatoma.    Assessment and Plan     ICD-10-CM    1. Granuloma of tympanic membrane, bilateral  H71.13    2. Otorrhea, bilateral  H92.13    3. Chronic Eustachian tube dysfunction, bilateral  H69.83    4. Retracted tympanic membrane, bilateral  H73.893       It was my pleasure seeing Divina BOLTON Mariannevirgil today in clinic.  The patient has obvious bilateral chronic middle ear disease.  She has evidence of otitis externa and otorrhea bilaterally.  She has tympanic membrane granulation bilaterally.  I think we will treat her with Ciprodex drops 5 drops to both ears twice a day for 2 weeks.  I will have her come back and see me 1 to 2 weeks after finishing the drops.  She might be a good candidate for ear tube placement to help her severe retraction and her symptoms.    I would like to see how much she improves after drop treatment.  The patient knows to contact me if she is having any problems or concerns.  We discussed water precautions for the ear and not placing anything in the ear except for eardrops.    Divina to follow up with Primary Care provider regarding elevated blood pressure.    Migue Antunez MD  Department of Otolaryngology-Head and Neck Surgery  [facility]

## 2022-01-18 NOTE — TELEPHONE ENCOUNTER
Patient hoping to get this done today. Please see previous note.    Lucila Jacobs, FRANCISCO Sheriff

## 2022-01-20 ENCOUNTER — OFFICE VISIT (OUTPATIENT)
Dept: OTOLARYNGOLOGY | Facility: CLINIC | Age: 37
End: 2022-01-20
Payer: MEDICARE

## 2022-01-20 ENCOUNTER — OFFICE VISIT (OUTPATIENT)
Dept: AUDIOLOGY | Facility: CLINIC | Age: 37
End: 2022-01-20
Payer: MEDICARE

## 2022-01-20 VITALS
TEMPERATURE: 99.3 F | SYSTOLIC BLOOD PRESSURE: 142 MMHG | HEART RATE: 90 BPM | DIASTOLIC BLOOD PRESSURE: 71 MMHG | BODY MASS INDEX: 36.5 KG/M2 | WEIGHT: 206 LBS | HEIGHT: 63 IN

## 2022-01-20 DIAGNOSIS — H61.23 BILATERAL IMPACTED CERUMEN: ICD-10-CM

## 2022-01-20 DIAGNOSIS — H73.893 RETRACTED TYMPANIC MEMBRANE, BILATERAL: ICD-10-CM

## 2022-01-20 DIAGNOSIS — H69.93 CHRONIC EUSTACHIAN TUBE DYSFUNCTION, BILATERAL: ICD-10-CM

## 2022-01-20 DIAGNOSIS — H69.93 DISORDER OF BOTH EUSTACHIAN TUBES: Primary | ICD-10-CM

## 2022-01-20 DIAGNOSIS — H92.13 OTORRHEA, BILATERAL: ICD-10-CM

## 2022-01-20 DIAGNOSIS — H71.13 GRANULOMA OF TYMPANIC MEMBRANE, BILATERAL: Primary | ICD-10-CM

## 2022-01-20 PROCEDURE — 99213 OFFICE O/P EST LOW 20 MIN: CPT | Mod: 25 | Performed by: OTOLARYNGOLOGY

## 2022-01-20 PROCEDURE — 69210 REMOVE IMPACTED EAR WAX UNI: CPT | Performed by: OTOLARYNGOLOGY

## 2022-01-20 PROCEDURE — 99207 PR NO CHARGE LOS: CPT | Performed by: AUDIOLOGIST

## 2022-01-20 ASSESSMENT — MIFFLIN-ST. JEOR: SCORE: 1598.54

## 2022-01-20 NOTE — PATIENT INSTRUCTIONS
Per physician instructions      If you have questions or concerns on any instructions given to you by your provider today or if you need to schedule an appointment, you can reach us at [phone].

## 2022-01-20 NOTE — NURSING NOTE
"Initial BP (!) 142/71 (BP Location: Right arm, Patient Position: Sitting, Cuff Size: Adult Regular)   Pulse 90   Temp 99.3  F (37.4  C) (Tympanic)   Ht 1.6 m (5' 3\")   Wt 93.4 kg (206 lb)   BMI 36.49 kg/m   Estimated body mass index is 36.49 kg/m  as calculated from the following:    Height as of this encounter: 1.6 m (5' 3\").    Weight as of this encounter: 93.4 kg (206 lb). .    Elham Hernandez CMA    "

## 2022-01-20 NOTE — LETTER
1/20/2022         RE: Divina Delgadillo  [address]  [place] [address]        Dear Colleague,    Thank you for referring your patient, Divina Delgadillo, to the [facility]. Please see a copy of my visit note below.    Chief Complaint   Patient presents with     Ear Problem     Check ears- 6 month follow up- ears are draining issues for about 2 months/hx of granuloma of left ear membrane     History of Present Illness  Divina Delgadillo is a 36 year old female presents today for follow-up.  I last evaluated the patient on 7/23/2021.  The patient was having issues with granulation and drainage.  I treated her with a longer course of steroid drops.  When she was last seen in July 2021, she had resolution of the granulation.  She does have some retraction of the tympanic membrane but no deep retraction pockets or evidence of cholesteatoma.  She returns today for follow-up.      Since last seeing the patient, the patient reports that she did well for quite some time.  Over the past month or 6 weeks, she has had increasing ear plugging, moisture, drainage, decreased hearing, and some discomfort.  She does intermittently have some problems of dizziness and imbalance usually during the day.  She has had ear tubes as a child but no other recent ear surgery.  No significant noise exposure history, heavy machinery, farm equipment, firearm use.  She does have intermittent tinnitus.     I did review her temporal bone CT from 11/26/2018.  There was a small soft tissue density in right Prussic's space with some significant retraction of the right tympanic membrane.  There did not really appear to be any significant erosion of the scutum.  There is opacification of the right mastoid air cells.  On the left, the tympanic membrane was severely retracted.  There is no middle ear opacification, opacification of thoracic space, or scutal erosion.  The mastoid was under aerated on the  left.     Past Medical History  Patient Active Problem List   Diagnosis     Esophageal reflux     NAFL (nonalcoholic fatty liver)     Essential hypertension     Hyperlipidemia LDL goal <100     Stage 3 chronic kidney disease     Mucinous neoplasm of pancreas     Type 2 diabetes mellitus with stage 3 chronic kidney disease, with long-term current use of insulin (H)     Bipolar disorder (H)     Cervical high risk HPV (human papillomavirus) test positive     IUD (intrauterine device) in place     Morbid obesity (H)     Mild intermittent asthma with acute exacerbation     Nicotine abuse     Chronic leukocytosis     Acquired asplenia     Borderline personality disorder (H)     Asthma     PTSD (post-traumatic stress disorder)     Long term (current) use of anticoagulants     Orthostatic hypotension     Tobacco abuse     Vitamin D insufficiency     Depressive disorder     Schizoaffective disorder, depressive type (H)     Uncontrolled type 2 diabetes mellitus with diabetic polyneuropathy, with long-term current use of insulin (H)     Cardiac murmur     Type 2 diabetes mellitus (H)     History of DVT of arm  (deep vein thrombosis)     Insomnia, unspecified type     Current Medications    Current Outpatient Medications:      ACCU-CHEK GUIDE test strip, Use to test blood sugar 3 times daily or as directed., Disp: 150 strip, Rfl: 2     acetaminophen (TYLENOL) 325 MG tablet, Take 325-650 mg by mouth 2 tab every 4-6 hours as needed, do not exceed 9 tabs in 24hours, Disp: , Rfl:      amLODIPine (NORVASC) 10 MG tablet, TAKE ONE TABLET BY MOUTH DAILY, Disp: 28 tablet, Rfl: 4     ARIPiprazole (ABILIFY) 30 MG tablet, Take 30 mg by mouth daily, Disp: , Rfl:      atorvastatin (LIPITOR) 10 MG tablet, TAKE ONE TABLET BY MOUTH DAILY, Disp: 28 tablet, Rfl: 10     BIOTIN FORTE 5 MG TABS, TAKE ONE TABLET BY MOUTH DAILY, Disp: 60 tablet, Rfl: 10     bisacodyl (DULCOLAX) 10 MG suppository, Place 10 mg rectally , Disp: , Rfl:      blood glucose  (ACCU-CHEK GUIDE) test strip, TEST BLOOD SUGAR THREE TIMES DAILY, Disp: 100 strip, Rfl: 1     blood glucose monitoring (ACCU-CHEK CARLITOS PLUS) meter device kit, Use to test blood sugar three times daily or as directed., Disp: 1 kit, Rfl: 0     blood glucose monitoring (ACCU-CHEK FASTCLIX) lancets, Use to test blood sugar 3 times daily, Disp: 102 each, Rfl: 3     blood glucose monitoring (NO BRAND SPECIFIED) meter device kit, Use to test blood sugar 3 times daily or as directed.  Accu Chek Guide Meter., Disp: 1 kit, Rfl: 1     calcium carbonate (TUMS) 500 MG chewable tablet, Take 1 chew tab by mouth 2 times daily, Disp: , Rfl:      carvedilol (COREG) 6.25 MG tablet, TAKE ONE TABLET BY MOUTH TWICE DAILY, Disp: 56 tablet, Rfl: 4     cloZAPine (CLOZARIL) 100 MG tablet, Take 100 mg by mouth 2 times daily , Disp: , Rfl:      Continuous Blood Gluc Sensor (DEXCOM G6 SENSOR) MISC, 1 each every 10 days Change every 10 days., Disp: 9 each, Rfl: 1     Continuous Blood Gluc Transmit (DEXCOM G6 TRANSMITTER) MISC, 1 each every 3 months Change every 3 months., Disp: 1 each, Rfl: 1     docusate sodium (DSS) 100 MG capsule, daily as needed , Disp: , Rfl:      fluocinolone acetonide 0.01 % OIL, Place 4 drops in ear(s) 2 times daily as needed (ear itching), Disp: 20 mL, Rfl: 3     FLUoxetine (PROZAC) 40 MG capsule, Take 40 mg by mouth daily, Disp: , Rfl:      glucose (BD GLUCOSE) 4 g chewable tablet, Take 1 tablet by mouth every hour as needed for low blood sugar, Disp: 30 tablet, Rfl: 4     guaiFENesin (ROBITUSSIN) 100 MG/5ML SYRP, Take 5 mLs by mouth 2 tsp every 4 hours as needed for cough, Disp: , Rfl:      insulin glargine (BASAGLAR KWIKPEN) 100 UNIT/ML pen, INJECT 25 UNITS SUB Q DAILY, Disp: 9 mL, Rfl: 10     lamoTRIgine (LAMICTAL) 100 MG tablet, Take 100 mg by mouth At Bedtime, Disp: , Rfl:      lisinopril (ZESTRIL) 5 MG tablet, Take 1 tablet (5 mg) by mouth daily, Disp: 30 tablet, Rfl: 3     loperamide (IMODIUM A-D) 2 MG tablet,  2 caplets after loose stool then 1 after each loose stool do not exceed 4 in 24hrs, Disp: , Rfl:      loratadine (CLARITIN) 10 MG tablet, TAKE ONE TABLET BY MOUTH EVERY MORNING, Disp: 28 tablet, Rfl: 10     magnesium citrate solution, Take 296 mLs by mouth once, Disp: , Rfl:      montelukast (SINGULAIR) 10 MG tablet, TAKE ONE TABLET BY MOUTH AT BEDTIME, Disp: 28 tablet, Rfl: 4     NOVOLOG FLEXPEN 100 UNIT/ML soln, INJECT 1 TO 5 UNITS SUB Q THREE TIMES DAILY WITH MEALS PER SLIDING SCALE, Disp: 9 mL, Rfl: 10     nystatin (MYCOSTATIN) 983126 UNIT/GM external ointment, Apply liberally to corners of mouth 3x daily until seen, Disp: , Rfl:      NYSTATIN PO, Oral suspension 4-6ml rinse for 2 minutes then swallow 2 x daily, Disp: , Rfl:      omeprazole (PRILOSEC) 20 MG DR capsule, TAKE ONE  CAPSULE BY MOUTH DAILY, Disp: 28 capsule, Rfl: 10     OZEMPIC, 1 MG/DOSE, 4 MG/3ML SOPN, INJECT 1MG SUB-Q EVERY 7 DAYS (ON THURSDAYS), Disp: 3 mL, Rfl: 1     polyethylene glycol (MIRALAX) 17 GM/Dose powder, Take 17 g by mouth , Disp: , Rfl:      pramox-pe-glycerin-petrolatum (PREPARATION H) 1-0.25-14.4-15 % CREA cream, , Disp: , Rfl:      pregabalin (LYRICA) 50 MG capsule, Take 1 capsule (50 mg) by mouth 2 times daily, Disp: 60 capsule, Rfl: 1     QUEtiapine (SEROQUEL) 25 MG tablet, Take 25 mg by mouth 1 tablet 4 times daily as needed for agitation or hallucinations, Disp: , Rfl:      traZODone (DESYREL) 50 MG tablet, Take 50 mg by mouth nightly as needed , Disp: , Rfl:      ULTICARE MINI 31G X 6 MM insulin pen needle, USE 1 PEN NEEDLE DAILY, Disp: 100 each, Rfl: 10     VENTOLIN  (90 Base) MCG/ACT inhaler, INHALE 2 PUFFS INTO THE LUNGS EVERY SIX  HOURS AS NEEDED FOR SHORTNESS OF BREATH, Disp: 18 g, Rfl: 10     furosemide (LASIX) 20 MG tablet, Take 1 tablet (20 mg) by mouth daily for 5 days, Disp: 5 tablet, Rfl: 0     meclizine (ANTIVERT) 25 MG tablet, Take 1 tablet (25 mg) by mouth 3 times daily as needed for dizziness (Patient  not taking: Reported on 2022), Disp: 30 tablet, Rfl: 0    Allergies  Allergies   Allergen Reactions     Acetaminophen Other (See Comments)     pt previously tried to overdose on it, PMD does not want pt taking per pt.      Latex Rash       Social History  Social History     Socioeconomic History     Marital status: Single     Spouse name: Not on file     Number of children: 0     Years of education: Not on file     Highest education level: Not on file   Occupational History     Not on file   Tobacco Use     Smoking status: Former Smoker     Packs/day: 1.00     Years: 4.00     Pack years: 4.00     Types: Cigarettes     Quit date: 2019     Years since quittin.5     Smokeless tobacco: Never Used     Tobacco comment: one pack every 3 to 4 days    Vaping Use     Vaping Use: Never used   Substance and Sexual Activity     Alcohol use: No     Drug use: No     Sexual activity: Not Currently     Partners: Female     Birth control/protection: I.U.D.     Comment: Both male and female    Other Topics Concern     Parent/sibling w/ CABG, MI or angioplasty before 65F 55M? No   Social History Narrative     Not on file     Social Determinants of Health     Financial Resource Strain: Not on file   Food Insecurity: Not on file   Transportation Needs: Not on file   Physical Activity: Not on file   Stress: Not on file   Social Connections: Not on file   Intimate Partner Violence: Not on file   Housing Stability: Not on file       Family History  Family History   Problem Relation Age of Onset     Respiratory Mother         ASTHMA     Allergies Mother      Depression Mother      Heart Disease Father         HEART VALVE REPLACEMENT     Hypertension Father      Diabetes Father      Depression Father      Respiratory Brother      Allergies Brother      Respiratory Sister      Allergies Sister      Depression Sister      Respiratory Sister      Allergies Sister      Depression Sister      Kidney Disease No family hx of   "      Review of Systems  As per HPI and PMHx, otherwise 10 system review including the head and neck, constitutional, eyes, respiratory, GI, skin, neurologic, lymphatic, endocrine, and allergy systems is negative.    Physical Exam  BP (!) 142/71 (BP Location: Right arm, Patient Position: Sitting, Cuff Size: Adult Regular)   Pulse 90   Temp 99.3  F (37.4  C) (Tympanic)   Ht 1.6 m (5' 3\")   Wt 93.4 kg (206 lb)   BMI 36.49 kg/m    GENERAL: Patient is a pleasant, cooperative 36 year old female in no acute distress.  HEAD: Normocephalic, atraumatic.  Hair and scalp are normal.  EYES: Pupils are equal, round, reactive to light and accommodation.  Extraocular movements are intact.  The sclera nonicteric without injection.  The extraocular structures are normal.  EARS: See below.   NEUROLOGIC: Cranial nerves II through XII are grossly intact.  Voice is strong.  Facial nerve examination incomplete due to the patient wearing a mask.  CARDIOVASCULAR: Extremities are warm and well-perfused.  No significant peripheral edema.  RESPIRATORY: Patient has nonlabored breathing without cough, wheeze, stridor.  PSYCHIATRIC: Patient is alert and oriented.  Mood and affect appear normal.  SKIN: Warm and dry.  No scalp, face, or neck lesions noted.     Physical Exam and Procedure     EARS: Normal shape and symmetry.  No tenderness when palpating the mastoid or tragal areas bilaterally.  The ears were then examined under the otic binocular microscope to perform cerumen removal.  Otoscopic exam on the right reveals moist ceruminous debris and otorrhea impacted down to the level of the tympanic membrane.  There is granulation of the tympanic membrane centrally.  The ear was debrided with a #3 and #5 suction.  Some crusting was removed with an alligator forceps. The right tympanic membrane is intact with severe retraction onto the middle ear structures.  There are no retraction pockets or evidence of cholesteatoma.       Attention was " turned to the left ear.  Otoscopic exam on the left reveals impacted moist ceruminous debris and otorrhea down to the level of the tympanic membrane.  There is granulation at the posterior margin of the annulus.  There is debrided with a #3 #5 suction.  Some crusting was removed with an alligator forceps.  The left tympanic membrane is intact with severe retraction anteriorly onto the middle ear structures.  There are no retraction pockets or evidence of cholesteatoma.    Assessment and Plan     ICD-10-CM    1. Granuloma of tympanic membrane, bilateral  H71.13    2. Otorrhea, bilateral  H92.13    3. Chronic Eustachian tube dysfunction, bilateral  H69.83    4. Retracted tympanic membrane, bilateral  H73.893       It was my pleasure seeing Divina Delgadillo today in clinic.  The patient has obvious bilateral chronic middle ear disease.  She has evidence of otitis externa and otorrhea bilaterally.  She has tympanic membrane granulation bilaterally.  I think we will treat her with Ciprodex drops 5 drops to both ears twice a day for 2 weeks.  I will have her come back and see me 1 to 2 weeks after finishing the drops.  She might be a good candidate for ear tube placement to help her severe retraction and her symptoms.    I would like to see how much she improves after drop treatment.  The patient knows to contact me if she is having any problems or concerns.  We discussed water precautions for the ear and not placing anything in the ear except for eardrops.    Divina to follow up with Primary Care provider regarding elevated blood pressure.    Migue Antunez MD  Department of Otolaryngology-Head and Neck Surgery  [facility]         Again, thank you for allowing me to participate in the care of your patient.        Sincerely,        Migue Antunez MD

## 2022-01-20 NOTE — PROGRESS NOTES
AUDIOLOGY REPORT:    Patient was referred to [facility] from ENT by Dr. Antunez for a hearing examination. Patient reports near chronic bilateral ear infections. Patient reports once the infection is cleared up it returns again. Patient was unaccompanied to today's visit.     Testing:    Otoscopy:   Otoscopic exam indicates purulent drainage near the tympanic membrane bilaterally.       Further testing is postponed until medical management has taken place.     Discussed results with the patient.     Patient was returned to ENT for follow up.     Tommy Cassidy CCC-A  Licensed Audiologist  1/20/2022

## 2022-01-28 ENCOUNTER — MEDICAL CORRESPONDENCE (OUTPATIENT)
Dept: HEALTH INFORMATION MANAGEMENT | Facility: CLINIC | Age: 37
End: 2022-01-28
Payer: MEDICARE

## 2022-02-01 ENCOUNTER — TELEPHONE (OUTPATIENT)
Dept: OTOLARYNGOLOGY | Facility: CLINIC | Age: 37
End: 2022-02-01
Payer: MEDICARE

## 2022-02-01 DIAGNOSIS — H71.13 GRANULOMA OF TYMPANIC MEMBRANE, BILATERAL: Primary | ICD-10-CM

## 2022-02-01 RX ORDER — CIPROFLOXACIN AND DEXAMETHASONE 3; 1 MG/ML; MG/ML
5 SUSPENSION/ DROPS AURICULAR (OTIC) 2 TIMES DAILY
Qty: 7 ML | Refills: 0 | Status: SHIPPED | OUTPATIENT
Start: 2022-02-01 | End: 2022-02-15

## 2022-02-01 NOTE — TELEPHONE ENCOUNTER
Per office note:  I think we will treat her with Ciprodex drops 5 drops to both ears twice a day for 2 weeks.  I will have her come back and see me 1 to 2 weeks after finishing the drops.   Rx sent.

## 2022-02-01 NOTE — TELEPHONE ENCOUNTER
Reason for Call:  Other call back    Detailed comments: Received a call from Do DOMINIQUE) from pts foster home and she states  Dr. Antunez had put orders in for a medication on 1/20/22 but the Pharmacy never received the orders.    ([facility] pharmacy [phone])    Phone Number Patient can be reached at: Other phone number:  [phone]    Best Time:     Can we leave a detailed message on this number? YES    Call taken on 2/1/2022 at 12:04 PM by Oliva Buckley MA

## 2022-02-02 ENCOUNTER — OFFICE VISIT (OUTPATIENT)
Dept: FAMILY MEDICINE | Facility: CLINIC | Age: 37
End: 2022-02-02
Payer: MEDICARE

## 2022-02-02 VITALS
WEIGHT: 202 LBS | TEMPERATURE: 98 F | HEART RATE: 79 BPM | BODY MASS INDEX: 35.79 KG/M2 | DIASTOLIC BLOOD PRESSURE: 68 MMHG | HEIGHT: 63 IN | SYSTOLIC BLOOD PRESSURE: 112 MMHG | OXYGEN SATURATION: 99 %

## 2022-02-02 DIAGNOSIS — E11.22 TYPE 2 DIABETES MELLITUS WITH STAGE 3A CHRONIC KIDNEY DISEASE, WITH LONG-TERM CURRENT USE OF INSULIN (H): ICD-10-CM

## 2022-02-02 DIAGNOSIS — K83.1 CHOLESTATIC LIVER DISEASE (H): ICD-10-CM

## 2022-02-02 DIAGNOSIS — K21.9 GASTROESOPHAGEAL REFLUX DISEASE WITHOUT ESOPHAGITIS: ICD-10-CM

## 2022-02-02 DIAGNOSIS — N18.31 TYPE 2 DIABETES MELLITUS WITH STAGE 3A CHRONIC KIDNEY DISEASE, WITH LONG-TERM CURRENT USE OF INSULIN (H): ICD-10-CM

## 2022-02-02 DIAGNOSIS — I10 ESSENTIAL HYPERTENSION: Chronic | ICD-10-CM

## 2022-02-02 DIAGNOSIS — R60.0 BILATERAL LEG EDEMA: ICD-10-CM

## 2022-02-02 DIAGNOSIS — Z79.4 TYPE 2 DIABETES MELLITUS WITH STAGE 3A CHRONIC KIDNEY DISEASE, WITH LONG-TERM CURRENT USE OF INSULIN (H): ICD-10-CM

## 2022-02-02 DIAGNOSIS — F20.9 SCHIZOPHRENIA, UNSPECIFIED TYPE (H): ICD-10-CM

## 2022-02-02 LAB
ALBUMIN SERPL-MCNC: 3.4 G/DL (ref 3.4–5)
ALP SERPL-CCNC: 179 U/L (ref 40–150)
ALT SERPL W P-5'-P-CCNC: 60 U/L (ref 0–50)
AST SERPL W P-5'-P-CCNC: 33 U/L (ref 0–45)
BASOPHILS # BLD AUTO: 0.1 10E3/UL (ref 0–0.2)
BASOPHILS NFR BLD AUTO: 1 %
BILIRUB DIRECT SERPL-MCNC: <0.1 MG/DL (ref 0–0.2)
BILIRUB SERPL-MCNC: 0.4 MG/DL (ref 0.2–1.3)
EOSINOPHIL # BLD AUTO: 0.3 10E3/UL (ref 0–0.7)
EOSINOPHIL NFR BLD AUTO: 2 %
ERYTHROCYTE [DISTWIDTH] IN BLOOD BY AUTOMATED COUNT: 13.8 % (ref 10–15)
HCT VFR BLD AUTO: 39.6 % (ref 35–47)
HGB BLD-MCNC: 12.7 G/DL (ref 11.7–15.7)
LYMPHOCYTES # BLD AUTO: 3.3 10E3/UL (ref 0.8–5.3)
LYMPHOCYTES NFR BLD AUTO: 26 %
MCH RBC QN AUTO: 30.5 PG (ref 26.5–33)
MCHC RBC AUTO-ENTMCNC: 32.1 G/DL (ref 31.5–36.5)
MCV RBC AUTO: 95 FL (ref 78–100)
MONOCYTES # BLD AUTO: 1.1 10E3/UL (ref 0–1.3)
MONOCYTES NFR BLD AUTO: 8 %
NEUTROPHILS # BLD AUTO: 8.3 10E3/UL (ref 1.6–8.3)
NEUTROPHILS NFR BLD AUTO: 64 %
PLATELET # BLD AUTO: 409 10E3/UL (ref 150–450)
PROT SERPL-MCNC: 7.9 G/DL (ref 6.8–8.8)
RBC # BLD AUTO: 4.17 10E6/UL (ref 3.8–5.2)
WBC # BLD AUTO: 13.1 10E3/UL (ref 4–11)

## 2022-02-02 PROCEDURE — 85025 COMPLETE CBC W/AUTO DIFF WBC: CPT | Performed by: NURSE PRACTITIONER

## 2022-02-02 PROCEDURE — 99214 OFFICE O/P EST MOD 30 MIN: CPT | Performed by: NURSE PRACTITIONER

## 2022-02-02 PROCEDURE — 36415 COLL VENOUS BLD VENIPUNCTURE: CPT | Performed by: NURSE PRACTITIONER

## 2022-02-02 PROCEDURE — 80076 HEPATIC FUNCTION PANEL: CPT | Performed by: NURSE PRACTITIONER

## 2022-02-02 RX ORDER — SEMAGLUTIDE 1.34 MG/ML
0.75 INJECTION, SOLUTION SUBCUTANEOUS
Qty: 3 ML | Refills: 1 | Status: CANCELLED | OUTPATIENT
Start: 2022-02-02

## 2022-02-02 RX ORDER — INSULIN GLARGINE 100 [IU]/ML
27 INJECTION, SOLUTION SUBCUTANEOUS AT BEDTIME
Qty: 9 ML | Refills: 5 | Status: SHIPPED | OUTPATIENT
Start: 2022-02-02 | End: 2022-05-20

## 2022-02-02 RX ORDER — SEMAGLUTIDE 1.34 MG/ML
1 INJECTION, SOLUTION SUBCUTANEOUS WEEKLY
Qty: 3 ML | Refills: 1 | Status: SHIPPED | OUTPATIENT
Start: 2022-02-02 | End: 2022-07-06

## 2022-02-02 RX ORDER — AMLODIPINE BESYLATE 5 MG/1
5 TABLET ORAL
Qty: 90 TABLET | Refills: 1 | Status: SHIPPED | OUTPATIENT
Start: 2022-02-02 | End: 2022-07-06

## 2022-02-02 ASSESSMENT — MIFFLIN-ST. JEOR: SCORE: 1580.4

## 2022-02-02 NOTE — PATIENT INSTRUCTIONS
Reduce Norvasc to 5 mg daily and take it in the evening    Norvasc at higher doses can cause legs swelling    Continue compression stockings    Increase Basaglar to 27 units    Follow diabetic diet more closely

## 2022-02-02 NOTE — PROGRESS NOTES
"  Assessment & Plan     Uncontrolled type 2 diabetes mellitus with diabetic polyneuropathy, with long-term current use of insulin (H)  -recommended to follow diabetic diet more closely and increase basaglar to 27 units   - insulin glargine (BASAGLAR KWIKPEN) 100 UNIT/ML pen; Inject 27 Units Subcutaneous At Bedtime    Bilateral leg edema  -improved  -heart ECHO pending  -possibly due to side effects from Norvasc, recommended to reduce to 5 mg daily evening and continue to monitor BP    Essential hypertension  -well controlled   - amLODIPine (NORVASC) 5 MG tablet; Take 1 tablet (5 mg) by mouth daily (with dinner)    Cholestatic liver disease    - Hepatic function panel    Schizophrenia, unspecified type (H)  -following psychiatrist   - CBC with Platelets & Differential             BMI:   Estimated body mass index is 35.78 kg/m  as calculated from the following:    Height as of this encounter: 1.6 m (5' 3\").    Weight as of this encounter: 91.6 kg (202 lb).       See Patient Instructions    Return in about 4 weeks (around 3/2/2022) for Routine Visit.    VERONIQUE Spears [facility]    Rhiannon Murcia is a 36 year old who presents for the following health issues     HPI     Concern - EDEMA   Onset: 2 weeks   Description: She has been having bilateral leg swelling.   Intensity: mild  Progression of Symptoms:  Improving when she wears her compression stockings. Once she takes them off her legs get swollen again. Left leg is worse.   Accompanying Signs & Symptoms: YES   Previous history of similar problem: YES  Precipitating factors:        Worsened by: none   Alleviating factors:        Improved by: none   Therapies tried and outcome: compression stockings        Review of Systems   Constitutional, HEENT, cardiovascular, pulmonary, gi and gu systems are negative, except as otherwise noted.      Objective    /68 (BP Location: Right arm, Patient Position: Sitting, Cuff Size: Adult " "Large)   Pulse 79   Temp 98  F (36.7  C) (Tympanic)   Ht 1.6 m (5' 3\")   Wt 91.6 kg (202 lb)   SpO2 99%   BMI 35.78 kg/m    Body mass index is 35.78 kg/m .  Physical Exam   GENERAL: healthy, alert and no distress  EYES: Eyes grossly normal to inspection, PERRL and conjunctivae and sclerae normal  RESP: lungs clear to auscultation - no rales, rhonchi or wheezes  CV: regular rate and rhythm, normal S1 S2, no S3 or S4, no murmur, click or rub, no peripheral edema and peripheral pulses strong  NEURO: Normal strength and tone, mentation intact and speech normal  PSYCH: mentation appears normal, affect normal/bright            "

## 2022-02-02 NOTE — TELEPHONE ENCOUNTER
I have cued up this prescription.  Pt seen today.    Sig not listed on previous prescription so I need provider to sign this one.    Pharmacy needs this rx within an hour so they can deliver it to pt in time.    Routed to provider.    Odalys Mattson RN

## 2022-02-02 NOTE — TELEPHONE ENCOUNTER
Reason for Call:  Medication refill:    Do you use a [facility]?  Name of the pharmacy and phone number for the current request:  [facility]    Name of the medication requested: Ozempic    Other request: Ozempic, patient is out of med, pharmacy calling    Can we leave a detailed message on this number? YES    Phone number patient can be reached at: Other phone number:  [facility], [phone]    Best Time: Any    Call taken on 2/2/2022 at 3:18 PM by Rebecca Lang

## 2022-02-03 ENCOUNTER — TELEPHONE (OUTPATIENT)
Dept: FAMILY MEDICINE | Facility: CLINIC | Age: 37
End: 2022-02-03
Payer: MEDICARE

## 2022-02-03 RX ORDER — PROCHLORPERAZINE 25 MG/1
1 SUPPOSITORY RECTAL ONCE
Qty: 1 EACH | Refills: 0 | Status: SHIPPED | OUTPATIENT
Start: 2022-02-03 | End: 2022-02-03

## 2022-02-03 RX ORDER — PROCHLORPERAZINE 25 MG/1
1 SUPPOSITORY RECTAL
Qty: 9 EACH | Refills: 1 | Status: CANCELLED | OUTPATIENT
Start: 2022-02-03

## 2022-02-03 RX ORDER — PROCHLORPERAZINE 25 MG/1
1 SUPPOSITORY RECTAL
Qty: 1 EACH | Refills: 1 | Status: CANCELLED | OUTPATIENT
Start: 2022-02-03

## 2022-02-03 RX ORDER — PROCHLORPERAZINE 25 MG/1
1 SUPPOSITORY RECTAL
Qty: 1 EACH | Refills: 1 | Status: SHIPPED | OUTPATIENT
Start: 2022-02-03 | End: 2022-05-16

## 2022-02-03 RX ORDER — PROCHLORPERAZINE 25 MG/1
1 SUPPOSITORY RECTAL
Qty: 9 EACH | Refills: 1 | Status: SHIPPED | OUTPATIENT
Start: 2022-02-03 | End: 2022-05-16

## 2022-02-03 NOTE — TELEPHONE ENCOUNTER
"Requesting a new order for a Dexcom G6  due to Medicare Part B compliance.    Please send to [facility] ([phone])    Please write Rx with these specifications for Medicare compliance    Dexcom G6   Qty: 1  Refills: 0  Sig \"USE TO READ BLOOD SUGARS PER  INSTRUCTIONS\"    Please call [phone] and speak to one of our Durable Medical Equipment Team members if you have any questions.    "

## 2022-02-03 NOTE — TELEPHONE ENCOUNTER
Refills requested due to Medicare Part B compliance     Please call and speak to one of our Durable Medical Equipment Team members if you have any questions- [phone]

## 2022-02-07 ENCOUNTER — VIRTUAL VISIT (OUTPATIENT)
Dept: EDUCATION SERVICES | Facility: CLINIC | Age: 37
End: 2022-02-07
Attending: NURSE PRACTITIONER
Payer: MEDICARE

## 2022-02-07 PROCEDURE — 98968 PH1 ASSMT&MGMT NQHP 21-30: CPT | Mod: 95 | Performed by: DIETITIAN, REGISTERED

## 2022-02-07 NOTE — PROGRESS NOTES
Diabetes Follow-up    Subjective/Objective:  Type of Service: Telephone Visit  Originating Location (Patient Location): Home  Distant Location (Provider Location): Home  Mode of Communication:  Telephone    Telephone Visit Start Time: 103  Telephone Visit End Time (telephone visit stop time): 125    Diabetes is being managed with   Lifestyle (diet/activity), Diabetes Medications   Diabetes Medication(s)     Diabetic Other       glucose (BD GLUCOSE) 4 g chewable tablet    Take 1 tablet by mouth every hour as needed for low blood sugar     Patient not taking: Reported on 2/2/2022    Insulin       insulin glargine (BASAGLAR KWIKPEN) 100 UNIT/ML pen    Inject 27 Units Subcutaneous At Bedtime     NOVOLOG FLEXPEN 100 UNIT/ML soln    INJECT 1 TO 5 UNITS SUB Q THREE TIMES DAILY WITH MEALS PER SLIDING SCALE    Incretin Mimetic Agents (GLP-1 Receptor Agonists)       Semaglutide, 1 MG/DOSE, (OZEMPIC, 1 MG/DOSE,) 4 MG/3ML SOPN    Inject 1 mg Subcutaneous once a week          Lab Results   Component Value Date    A1C 8.2 01/12/2022    A1C 7.8 10/28/2021    A1C 7.4 07/08/2021    A1C 8.0 03/26/2021    A1C 9.0 08/14/2020    A1C 7.8 05/18/2020    A1C 7.4 02/17/2020         Assessment:  Recently the Dexcom system was ordered.  Have struggled getting continuous glucose monitor coverage in the past, however Divina is on a rapid acting insulin now, so should meet the Part B criteria.  Discussed [facility] and their contact information. She will call them to discuss the order.  Suspect the pharmacy may need the last PCP note addended with insulin regimen.   Group home staff will fax over blood sugars.  Recently the long acting insulin was increased from 24 to 27 units with last A1c.  Divina notes some major changes coming up - will be moving into an apartment independently.  Is working with her sisters on healthy eating and staying accountable. Her sister is sending her workout you tube videos.  Divina will be within  walking distance from [employer] when she moves. Recent blood sugars       Plan/Response:   Gave Diabetes ed contact information  - email and number  Set follow up in March   Working on Dexcom order   Continue with positive diet & lifestyle changes       Dolly Riley RD, LD, [facility]  Diabetes Education  Time spent: 23 minutes

## 2022-02-11 ENCOUNTER — OFFICE VISIT (OUTPATIENT)
Dept: OTOLARYNGOLOGY | Facility: CLINIC | Age: 37
End: 2022-02-11
Payer: MEDICARE

## 2022-02-11 VITALS
BODY MASS INDEX: 35.78 KG/M2 | TEMPERATURE: 98.4 F | WEIGHT: 202 LBS | SYSTOLIC BLOOD PRESSURE: 142 MMHG | DIASTOLIC BLOOD PRESSURE: 78 MMHG | HEART RATE: 85 BPM | OXYGEN SATURATION: 98 %

## 2022-02-11 DIAGNOSIS — Z71.6 ENCOUNTER FOR TOBACCO USE CESSATION COUNSELING: ICD-10-CM

## 2022-02-11 DIAGNOSIS — H69.93 CHRONIC EUSTACHIAN TUBE DYSFUNCTION, BILATERAL: ICD-10-CM

## 2022-02-11 DIAGNOSIS — F17.200 TOBACCO DEPENDENCE SYNDROME: ICD-10-CM

## 2022-02-11 DIAGNOSIS — H71.13 GRANULOMA OF TYMPANIC MEMBRANE, BILATERAL: Primary | ICD-10-CM

## 2022-02-11 DIAGNOSIS — H73.893 RETRACTED TYMPANIC MEMBRANE, BILATERAL: ICD-10-CM

## 2022-02-11 PROCEDURE — 99213 OFFICE O/P EST LOW 20 MIN: CPT | Mod: 25 | Performed by: OTOLARYNGOLOGY

## 2022-02-11 PROCEDURE — 92504 EAR MICROSCOPY EXAMINATION: CPT | Performed by: OTOLARYNGOLOGY

## 2022-02-11 ASSESSMENT — PAIN SCALES - GENERAL: PAINLEVEL: MODERATE PAIN (5)

## 2022-02-11 NOTE — PROGRESS NOTES
Chief Complaint   Patient presents with     RECHECK     recheck ears after using ciprodex drops= patient states no change right ear painful 5/10 and left ear is plugged     History of Present Illness  Divina Delgadillo is a 36 year old female who presents today for follow-up. The patient  has a history of otitis externa with granulation of the tympanic membrane and recurrent drainage.  In July 2021, I treated her with a longer course of steroid eardrops with resolution of her symptoms.  She does have some retraction of the tympanic membrane but no deep retraction pockets or evidence of cholesteatoma.   I last evaluated the patient on 1/20/2022.  She again had bilateral otitis externa with granulation and drainage.  Her ears were debrided and I started her again on Ciprodex drops.  She returns today for follow-up.      Since last seeing the patient, the patient reports  that the right ear is still bothering her.  The drainage has resolved after stopping the drops.  She feels like the hearing is slightly down in the left ear.  She does intermittently have some problems of dizziness and imbalance usually during the day.  She has had ear tubes as a child but no other recent ear surgery.  No significant noise exposure history, heavy machinery, farm equipment, firearm use.  She does have intermittent tinnitus.     I did review her temporal bone CT from 11/26/2018.  There was a small soft tissue density in right Prussic's space with some significant retraction of the right tympanic membrane.  There did not really appear to be any significant erosion of the scutum.  There is opacification of the right mastoid air cells.  On the left, the tympanic membrane was severely retracted.  There is no middle ear opacification, opacification of thoracic space, or scutal erosion.  The mastoid was under aerated on the left.    Past Medical History  Patient Active Problem List   Diagnosis     Esophageal reflux     NAFL (nonalcoholic  fatty liver)     Essential hypertension     Hyperlipidemia LDL goal <100     Stage 3 chronic kidney disease     Mucinous neoplasm of pancreas     Type 2 diabetes mellitus with stage 3 chronic kidney disease, with long-term current use of insulin (H)     Bipolar disorder (H)     Cervical high risk HPV (human papillomavirus) test positive     IUD (intrauterine device) in place     Morbid obesity (H)     Mild intermittent asthma with acute exacerbation     Nicotine abuse     Chronic leukocytosis     Acquired asplenia     Borderline personality disorder (H)     Asthma     PTSD (post-traumatic stress disorder)     Long term (current) use of anticoagulants     Orthostatic hypotension     Tobacco abuse     Vitamin D insufficiency     Depressive disorder     Schizoaffective disorder, depressive type (H)     Uncontrolled type 2 diabetes mellitus with diabetic polyneuropathy, with long-term current use of insulin (H)     Cardiac murmur     Type 2 diabetes mellitus (H)     History of DVT of arm  (deep vein thrombosis)     Insomnia, unspecified type     Current Medications    Current Outpatient Medications:      ACCU-CHEK GUIDE test strip, Use to test blood sugar 3 times daily or as directed., Disp: 150 strip, Rfl: 2     acetaminophen (TYLENOL) 325 MG tablet, Take 325-650 mg by mouth 2 tab every 4-6 hours as needed, do not exceed 9 tabs in 24hours , Disp: , Rfl:      amLODIPine (NORVASC) 5 MG tablet, Take 1 tablet (5 mg) by mouth daily (with dinner), Disp: 90 tablet, Rfl: 1     ARIPiprazole (ABILIFY) 30 MG tablet, Take 30 mg by mouth daily, Disp: , Rfl:      atorvastatin (LIPITOR) 10 MG tablet, TAKE ONE TABLET BY MOUTH DAILY, Disp: 28 tablet, Rfl: 10     BIOTIN FORTE 5 MG TABS, TAKE ONE TABLET BY MOUTH DAILY, Disp: 60 tablet, Rfl: 10     bisacodyl (DULCOLAX) 10 MG suppository, Place 10 mg rectally , Disp: , Rfl:      blood glucose (ACCU-CHEK GUIDE) test strip, TEST BLOOD SUGAR THREE TIMES DAILY, Disp: 100 strip, Rfl: 1      blood glucose monitoring (ACCU-CHEK CARLITOS PLUS) meter device kit, Use to test blood sugar three times daily or as directed., Disp: 1 kit, Rfl: 0     blood glucose monitoring (ACCU-CHEK FASTCLIX) lancets, Use to test blood sugar 3 times daily, Disp: 102 each, Rfl: 3     blood glucose monitoring (NO BRAND SPECIFIED) meter device kit, Use to test blood sugar 3 times daily or as directed.  Accu Chek Guide Meter., Disp: 1 kit, Rfl: 1     calcium carbonate (TUMS) 500 MG chewable tablet, Take 1 chew tab by mouth 2 times daily, Disp: , Rfl:      carvedilol (COREG) 6.25 MG tablet, TAKE ONE TABLET BY MOUTH TWICE DAILY, Disp: 56 tablet, Rfl: 4     cloZAPine (CLOZARIL) 100 MG tablet, Take 100 mg by mouth 2 times daily , Disp: , Rfl:      COMPOUNDED NON-CONTROLLED SUBSTANCE (CMPD RX) - PHARMACY TO MIX COMPOUNDED MEDICATION, Chloramphenicol 50 mg, sulfamethoxazole 50 mg, amphotericin B 5 mg, hydrocortisone 1 mg. 2-3 puffs to affected ear PRN itching/drainage, Disp: 30 capsule, Rfl: 1     Continuous Blood Gluc Sensor (DEXCOM G6 SENSOR) MISC, 1 each every 10 days Change every 10 days. USE TO READ BLOOD SUGARS PER  INSTRUCTIONS, Disp: 9 each, Rfl: 1     Continuous Blood Gluc Transmit (DEXCOM G6 TRANSMITTER) MISC, 1 each every 3 months Change every 3 months. USE TO READ BLOOD SUGARS PER  INSTRUCTIONS, Disp: 1 each, Rfl: 1     docusate sodium (DSS) 100 MG capsule, daily as needed , Disp: , Rfl:      fluocinolone acetonide 0.01 % OIL, Place 4 drops in ear(s) 2 times daily as needed (ear itching), Disp: 20 mL, Rfl: 3     FLUoxetine (PROZAC) 40 MG capsule, Take 40 mg by mouth daily, Disp: , Rfl:      glucose (BD GLUCOSE) 4 g chewable tablet, Take 1 tablet by mouth every hour as needed for low blood sugar, Disp: 30 tablet, Rfl: 4     guaiFENesin (ROBITUSSIN) 100 MG/5ML SYRP, Take 5 mLs by mouth 2 tsp every 4 hours as needed for cough, Disp: , Rfl:      insulin glargine (BASAGLAR KWIKPEN) 100 UNIT/ML pen, Inject 27  Units Subcutaneous At Bedtime, Disp: 9 mL, Rfl: 5     lamoTRIgine (LAMICTAL) 100 MG tablet, Take 100 mg by mouth At Bedtime, Disp: , Rfl:      lisinopril (ZESTRIL) 5 MG tablet, Take 1 tablet (5 mg) by mouth daily, Disp: 30 tablet, Rfl: 3     loperamide (IMODIUM A-D) 2 MG tablet, 2 caplets after loose stool then 1 after each loose stool do not exceed 4 in 24hrs, Disp: , Rfl:      loratadine (CLARITIN) 10 MG tablet, TAKE ONE TABLET BY MOUTH EVERY MORNING, Disp: 28 tablet, Rfl: 10     magnesium citrate solution, Take 296 mLs by mouth once, Disp: , Rfl:      meclizine (ANTIVERT) 25 MG tablet, Take 1 tablet (25 mg) by mouth 3 times daily as needed for dizziness, Disp: 30 tablet, Rfl: 0     montelukast (SINGULAIR) 10 MG tablet, TAKE ONE TABLET BY MOUTH AT BEDTIME, Disp: 28 tablet, Rfl: 4     NOVOLOG FLEXPEN 100 UNIT/ML soln, INJECT 1 TO 5 UNITS SUB Q THREE TIMES DAILY WITH MEALS PER SLIDING SCALE, Disp: 9 mL, Rfl: 10     nystatin (MYCOSTATIN) 662406 UNIT/GM external ointment, Apply liberally to corners of mouth 3x daily until seen, Disp: , Rfl:      NYSTATIN PO, Oral suspension 4-6ml rinse for 2 minutes then swallow 2 x daily, Disp: , Rfl:      omeprazole (PRILOSEC) 20 MG DR capsule, Take 1 capsule (20 mg) by mouth daily, Disp: 28 capsule, Rfl: 10     polyethylene glycol (MIRALAX) 17 GM/Dose powder, Take 17 g by mouth , Disp: , Rfl:      pramox-pe-glycerin-petrolatum (PREPARATION H) 1-0.25-14.4-15 % CREA cream, , Disp: , Rfl:      pregabalin (LYRICA) 50 MG capsule, Take 1 capsule (50 mg) by mouth 2 times daily, Disp: 60 capsule, Rfl: 1     QUEtiapine (SEROQUEL) 25 MG tablet, Take 25 mg by mouth 1 tablet 4 times daily as needed for agitation or hallucinations, Disp: , Rfl:      Semaglutide, 1 MG/DOSE, (OZEMPIC, 1 MG/DOSE,) 4 MG/3ML SOPN, Inject 1 mg Subcutaneous once a week, Disp: 3 mL, Rfl: 1     traZODone (DESYREL) 50 MG tablet, Take 50 mg by mouth nightly as needed , Disp: , Rfl:      ULTICARE MINI 31G X 6 MM insulin  pen needle, USE 1 PEN NEEDLE DAILY, Disp: 100 each, Rfl: 10     VENTOLIN  (90 Base) MCG/ACT inhaler, INHALE 2 PUFFS INTO THE LUNGS EVERY SIX  HOURS AS NEEDED FOR SHORTNESS OF BREATH, Disp: 18 g, Rfl: 10     ciprofloxacin-dexamethasone (CIPRODEX) 0.3-0.1 % otic suspension, Place 5 drops into both ears 2 times daily for 14 days Then clinic appt 1-2 weeks post completion (Patient not taking: Reported on 2022), Disp: 7 mL, Rfl: 0    Allergies  Allergies   Allergen Reactions     Acetaminophen Other (See Comments)     pt previously tried to overdose on it, PMD does not want pt taking per pt.      Latex Rash       Social History  Social History     Socioeconomic History     Marital status: Single     Spouse name: Not on file     Number of children: 0     Years of education: Not on file     Highest education level: Not on file   Occupational History     Not on file   Tobacco Use     Smoking status: Current Every Day Smoker     Packs/day: 1.00     Years: 4.00     Pack years: 4.00     Types: Cigarettes     Last attempt to quit: 2019     Years since quittin.6     Smokeless tobacco: Never Used   Vaping Use     Vaping Use: Never used   Substance and Sexual Activity     Alcohol use: No     Drug use: No     Sexual activity: Not Currently     Partners: Female     Birth control/protection: I.U.D.     Comment: Both male and female    Other Topics Concern     Parent/sibling w/ CABG, MI or angioplasty before 65F 55M? No   Social History Narrative     Not on file     Social Determinants of Health     Financial Resource Strain: Not on file   Food Insecurity: Not on file   Transportation Needs: Not on file   Physical Activity: Not on file   Stress: Not on file   Social Connections: Not on file   Intimate Partner Violence: Not on file   Housing Stability: Not on file       Family History  Family History   Problem Relation Age of Onset     Respiratory Mother         ASTHMA     Allergies Mother      Depression Mother       Chronic Obstructive Pulmonary Disease Mother      Heart Disease Father         HEART VALVE REPLACEMENT     Hypertension Father      Diabetes Father      Depression Father      Respiratory Sister      Allergies Sister      Depression Sister      Respiratory Sister      Allergies Sister      Depression Sister      Respiratory Brother      Allergies Brother      Kidney Disease No family hx of        Review of Systems  As per HPI and PMHx, otherwise 10 system review including the head and neck, constitutional, eyes, respiratory, GI, skin, neurologic, lymphatic, endocrine, and allergy systems is negative.    Physical Exam  BP (!) 142/78 (BP Location: Right arm, Patient Position: Chair, Cuff Size: Adult Large)   Pulse 85   Temp 98.4  F (36.9  C) (Tympanic)   Wt 91.6 kg (202 lb)   SpO2 98%   BMI 35.78 kg/m    GENERAL: Patient is a pleasant, cooperative 36 year old female in no acute distress.  HEAD: Normocephalic, atraumatic.  Hair and scalp are normal.  EYES: Pupils are equal, round, reactive to light and accommodation.  Extraocular movements are intact.  The sclera nonicteric without injection.  The extraocular structures are normal.  EARS: See below.   NEUROLOGIC: Cranial nerves II through XII are grossly intact.  Voice is strong.  Facial nerve examination incomplete due to the patient wearing a mask.  CARDIOVASCULAR: Extremities are warm and well-perfused.  No significant peripheral edema.  RESPIRATORY: Patient has nonlabored breathing without cough, wheeze, stridor.  PSYCHIATRIC: Patient is alert and oriented.  Mood and affect appear normal.  SKIN: Warm and dry.  No scalp, face, or neck lesions noted.     Physical Exam and Procedure     EARS: Normal shape and symmetry.  No tenderness when palpating the mastoid or tragal areas bilaterally.  The ears were then examined under the otic binocular microscope.  Otoscopic exam on the right reveals a mostly clear canal.  The right tympanic membrane is intact with some  slight attic retraction.  No deep retraction pockets.  In the posterior-inferior quadrant, there is a slight amount of granularity still the tympanic membrane without reji granulation tissue or active drainage.  No significant tympanic membrane retraction or evidence of cholesteatoma.           Attention was turned to the left ear.  Otoscopic exam on the left reveals a mostly clear canal with some slight dry cerumen.  The granulation tissue has resolved.  There is no drainage or active otorrhea.  The left tympanic membrane is intact with moderately severe severe retraction anteriorly onto the middle ear structures, slight attic retraction without deep retraction pockets or evidence of cholesteatoma.        Assessment and Plan     ICD-10-CM    1. Granuloma of tympanic membrane, bilateral  H71.13 Microscopy, Binocular     COMPOUNDED NON-CONTROLLED SUBSTANCE (CMPD RX) - PHARMACY TO MIX COMPOUNDED MEDICATION     CANCELED: Remove Cerumen   2. Chronic Eustachian tube dysfunction, bilateral  H69.83 Microscopy, Binocular     COMPOUNDED NON-CONTROLLED SUBSTANCE (CMPD RX) - PHARMACY TO MIX COMPOUNDED MEDICATION     CANCELED: Remove Cerumen   3. Retracted tympanic membrane, bilateral  H73.893 Microscopy, Binocular     COMPOUNDED NON-CONTROLLED SUBSTANCE (CMPD RX) - PHARMACY TO MIX COMPOUNDED MEDICATION     CANCELED: Remove Cerumen   4. Tobacco dependence syndrome  F17.200 Microscopy, Binocular     COMPOUNDED NON-CONTROLLED SUBSTANCE (CMPD RX) - PHARMACY TO MIX COMPOUNDED MEDICATION     CANCELED: Remove Cerumen   5. Encounter for tobacco use cessation counseling  Z71.6 Microscopy, Binocular     COMPOUNDED NON-CONTROLLED SUBSTANCE (CMPD RX) - PHARMACY TO MIX COMPOUNDED MEDICATION     CANCELED: Remove Cerumen      It was my pleasure seeing Divina Delgadillo today in clinic.  Overall, the patient's ears appear much improved.  She still has a little bit of granularity on the right-hand side.  We discussed a trial of ear powder to  see if that would help keep moisture out of the ears and resolve her symptoms more quickly.  I sent a prescription for the compounding pharmacy and gave her an otic insufflator.  I like to see her back in 4 to 6 weeks to recheck the ears or sooner if her symptoms warrant.  We discussed water precautions for the ear and not placing anything in the ear except for eardrops.    Divina to follow up with Primary Care provider regarding elevated blood pressure.    Migue Antunez MD  Department of Otolaryngology-Head and Neck Surgery  [facility]

## 2022-02-11 NOTE — NURSING NOTE
"Initial BP (!) 142/78 (BP Location: Right arm, Patient Position: Chair, Cuff Size: Adult Large)   Pulse 85   Temp 98.4  F (36.9  C) (Tympanic)   Wt 91.6 kg (202 lb)   SpO2 98%   BMI 35.78 kg/m   Estimated body mass index is 35.78 kg/m  as calculated from the following:    Height as of 2/2/22: 1.6 m (5' 3\").    Weight as of this encounter: 91.6 kg (202 lb). .    June Burroughs LPN    "

## 2022-02-11 NOTE — LETTER
2/11/2022         RE: Divina Delgadillo  [address]  [place] [address]        Dear Colleague,    Thank you for referring your patient, Divina Delgadillo, to the [facility]. Please see a copy of my visit note below.    Chief Complaint   Patient presents with     RECHECK     recheck ears after using ciprodex drops= patient states no change right ear painful 5/10 and left ear is plugged     History of Present Illness  Divina Delgadillo is a 36 year old female who presents today for follow-up. The patient  has a history of otitis externa with granulation of the tympanic membrane and recurrent drainage.  In July 2021, I treated her with a longer course of steroid eardrops with resolution of her symptoms.  She does have some retraction of the tympanic membrane but no deep retraction pockets or evidence of cholesteatoma.   I last evaluated the patient on 1/20/2022.  She again had bilateral otitis externa with granulation and drainage.  Her ears were debrided and I started her again on Ciprodex drops.  She returns today for follow-up.      Since last seeing the patient, the patient reports  that the right ear is still bothering her.  The drainage has resolved after stopping the drops.  She feels like the hearing is slightly down in the left ear.  She does intermittently have some problems of dizziness and imbalance usually during the day.  She has had ear tubes as a child but no other recent ear surgery.  No significant noise exposure history, heavy machinery, farm equipment, firearm use.  She does have intermittent tinnitus.     I did review her temporal bone CT from 11/26/2018.  There was a small soft tissue density in right Prussic's space with some significant retraction of the right tympanic membrane.  There did not really appear to be any significant erosion of the scutum.  There is opacification of the right mastoid air cells.  On the left, the tympanic membrane was  severely retracted.  There is no middle ear opacification, opacification of thoracic space, or scutal erosion.  The mastoid was under aerated on the left.    Past Medical History  Patient Active Problem List   Diagnosis     Esophageal reflux     NAFL (nonalcoholic fatty liver)     Essential hypertension     Hyperlipidemia LDL goal <100     Stage 3 chronic kidney disease     Mucinous neoplasm of pancreas     Type 2 diabetes mellitus with stage 3 chronic kidney disease, with long-term current use of insulin (H)     Bipolar disorder (H)     Cervical high risk HPV (human papillomavirus) test positive     IUD (intrauterine device) in place     Morbid obesity (H)     Mild intermittent asthma with acute exacerbation     Nicotine abuse     Chronic leukocytosis     Acquired asplenia     Borderline personality disorder (H)     Asthma     PTSD (post-traumatic stress disorder)     Long term (current) use of anticoagulants     Orthostatic hypotension     Tobacco abuse     Vitamin D insufficiency     Depressive disorder     Schizoaffective disorder, depressive type (H)     Uncontrolled type 2 diabetes mellitus with diabetic polyneuropathy, with long-term current use of insulin (H)     Cardiac murmur     Type 2 diabetes mellitus (H)     History of DVT of arm  (deep vein thrombosis)     Insomnia, unspecified type     Current Medications    Current Outpatient Medications:      ACCU-CHEK GUIDE test strip, Use to test blood sugar 3 times daily or as directed., Disp: 150 strip, Rfl: 2     acetaminophen (TYLENOL) 325 MG tablet, Take 325-650 mg by mouth 2 tab every 4-6 hours as needed, do not exceed 9 tabs in 24hours , Disp: , Rfl:      amLODIPine (NORVASC) 5 MG tablet, Take 1 tablet (5 mg) by mouth daily (with dinner), Disp: 90 tablet, Rfl: 1     ARIPiprazole (ABILIFY) 30 MG tablet, Take 30 mg by mouth daily, Disp: , Rfl:      atorvastatin (LIPITOR) 10 MG tablet, TAKE ONE TABLET BY MOUTH DAILY, Disp: 28 tablet, Rfl: 10     BIOTIN FORTE  5 MG TABS, TAKE ONE TABLET BY MOUTH DAILY, Disp: 60 tablet, Rfl: 10     bisacodyl (DULCOLAX) 10 MG suppository, Place 10 mg rectally , Disp: , Rfl:      blood glucose (ACCU-CHEK GUIDE) test strip, TEST BLOOD SUGAR THREE TIMES DAILY, Disp: 100 strip, Rfl: 1     blood glucose monitoring (ACCU-CHEK CARLITOS PLUS) meter device kit, Use to test blood sugar three times daily or as directed., Disp: 1 kit, Rfl: 0     blood glucose monitoring (ACCU-CHEK FASTCLIX) lancets, Use to test blood sugar 3 times daily, Disp: 102 each, Rfl: 3     blood glucose monitoring (NO BRAND SPECIFIED) meter device kit, Use to test blood sugar 3 times daily or as directed.  Accu Chek Guide Meter., Disp: 1 kit, Rfl: 1     calcium carbonate (TUMS) 500 MG chewable tablet, Take 1 chew tab by mouth 2 times daily, Disp: , Rfl:      carvedilol (COREG) 6.25 MG tablet, TAKE ONE TABLET BY MOUTH TWICE DAILY, Disp: 56 tablet, Rfl: 4     cloZAPine (CLOZARIL) 100 MG tablet, Take 100 mg by mouth 2 times daily , Disp: , Rfl:      COMPOUNDED NON-CONTROLLED SUBSTANCE (CMPD RX) - PHARMACY TO MIX COMPOUNDED MEDICATION, Chloramphenicol 50 mg, sulfamethoxazole 50 mg, amphotericin B 5 mg, hydrocortisone 1 mg. 2-3 puffs to affected ear PRN itching/drainage, Disp: 30 capsule, Rfl: 1     Continuous Blood Gluc Sensor (DEXCOM G6 SENSOR) MISC, 1 each every 10 days Change every 10 days. USE TO READ BLOOD SUGARS PER  INSTRUCTIONS, Disp: 9 each, Rfl: 1     Continuous Blood Gluc Transmit (DEXCOM G6 TRANSMITTER) MISC, 1 each every 3 months Change every 3 months. USE TO READ BLOOD SUGARS PER  INSTRUCTIONS, Disp: 1 each, Rfl: 1     docusate sodium (DSS) 100 MG capsule, daily as needed , Disp: , Rfl:      fluocinolone acetonide 0.01 % OIL, Place 4 drops in ear(s) 2 times daily as needed (ear itching), Disp: 20 mL, Rfl: 3     FLUoxetine (PROZAC) 40 MG capsule, Take 40 mg by mouth daily, Disp: , Rfl:      glucose (BD GLUCOSE) 4 g chewable tablet, Take 1 tablet by  mouth every hour as needed for low blood sugar, Disp: 30 tablet, Rfl: 4     guaiFENesin (ROBITUSSIN) 100 MG/5ML SYRP, Take 5 mLs by mouth 2 tsp every 4 hours as needed for cough, Disp: , Rfl:      insulin glargine (BASAGLAR KWIKPEN) 100 UNIT/ML pen, Inject 27 Units Subcutaneous At Bedtime, Disp: 9 mL, Rfl: 5     lamoTRIgine (LAMICTAL) 100 MG tablet, Take 100 mg by mouth At Bedtime, Disp: , Rfl:      lisinopril (ZESTRIL) 5 MG tablet, Take 1 tablet (5 mg) by mouth daily, Disp: 30 tablet, Rfl: 3     loperamide (IMODIUM A-D) 2 MG tablet, 2 caplets after loose stool then 1 after each loose stool do not exceed 4 in 24hrs, Disp: , Rfl:      loratadine (CLARITIN) 10 MG tablet, TAKE ONE TABLET BY MOUTH EVERY MORNING, Disp: 28 tablet, Rfl: 10     magnesium citrate solution, Take 296 mLs by mouth once, Disp: , Rfl:      meclizine (ANTIVERT) 25 MG tablet, Take 1 tablet (25 mg) by mouth 3 times daily as needed for dizziness, Disp: 30 tablet, Rfl: 0     montelukast (SINGULAIR) 10 MG tablet, TAKE ONE TABLET BY MOUTH AT BEDTIME, Disp: 28 tablet, Rfl: 4     NOVOLOG FLEXPEN 100 UNIT/ML soln, INJECT 1 TO 5 UNITS SUB Q THREE TIMES DAILY WITH MEALS PER SLIDING SCALE, Disp: 9 mL, Rfl: 10     nystatin (MYCOSTATIN) 777053 UNIT/GM external ointment, Apply liberally to corners of mouth 3x daily until seen, Disp: , Rfl:      NYSTATIN PO, Oral suspension 4-6ml rinse for 2 minutes then swallow 2 x daily, Disp: , Rfl:      omeprazole (PRILOSEC) 20 MG DR capsule, Take 1 capsule (20 mg) by mouth daily, Disp: 28 capsule, Rfl: 10     polyethylene glycol (MIRALAX) 17 GM/Dose powder, Take 17 g by mouth , Disp: , Rfl:      pramox-pe-glycerin-petrolatum (PREPARATION H) 1-0.25-14.4-15 % CREA cream, , Disp: , Rfl:      pregabalin (LYRICA) 50 MG capsule, Take 1 capsule (50 mg) by mouth 2 times daily, Disp: 60 capsule, Rfl: 1     QUEtiapine (SEROQUEL) 25 MG tablet, Take 25 mg by mouth 1 tablet 4 times daily as needed for agitation or hallucinations, Disp: ,  Rfl:      Semaglutide, 1 MG/DOSE, (OZEMPIC, 1 MG/DOSE,) 4 MG/3ML SOPN, Inject 1 mg Subcutaneous once a week, Disp: 3 mL, Rfl: 1     traZODone (DESYREL) 50 MG tablet, Take 50 mg by mouth nightly as needed , Disp: , Rfl:      ULTICARE MINI 31G X 6 MM insulin pen needle, USE 1 PEN NEEDLE DAILY, Disp: 100 each, Rfl: 10     VENTOLIN  (90 Base) MCG/ACT inhaler, INHALE 2 PUFFS INTO THE LUNGS EVERY SIX  HOURS AS NEEDED FOR SHORTNESS OF BREATH, Disp: 18 g, Rfl: 10     ciprofloxacin-dexamethasone (CIPRODEX) 0.3-0.1 % otic suspension, Place 5 drops into both ears 2 times daily for 14 days Then clinic appt 1-2 weeks post completion (Patient not taking: Reported on 2022), Disp: 7 mL, Rfl: 0    Allergies  Allergies   Allergen Reactions     Acetaminophen Other (See Comments)     pt previously tried to overdose on it, PMD does not want pt taking per pt.      Latex Rash       Social History  Social History     Socioeconomic History     Marital status: Single     Spouse name: Not on file     Number of children: 0     Years of education: Not on file     Highest education level: Not on file   Occupational History     Not on file   Tobacco Use     Smoking status: Current Every Day Smoker     Packs/day: 1.00     Years: 4.00     Pack years: 4.00     Types: Cigarettes     Last attempt to quit: 2019     Years since quittin.6     Smokeless tobacco: Never Used   Vaping Use     Vaping Use: Never used   Substance and Sexual Activity     Alcohol use: No     Drug use: No     Sexual activity: Not Currently     Partners: Female     Birth control/protection: I.U.D.     Comment: Both male and female    Other Topics Concern     Parent/sibling w/ CABG, MI or angioplasty before 65F 55M? No   Social History Narrative     Not on file     Social Determinants of Health     Financial Resource Strain: Not on file   Food Insecurity: Not on file   Transportation Needs: Not on file   Physical Activity: Not on file   Stress: Not on file    Social Connections: Not on file   Intimate Partner Violence: Not on file   Housing Stability: Not on file       Family History  Family History   Problem Relation Age of Onset     Respiratory Mother         ASTHMA     Allergies Mother      Depression Mother      Chronic Obstructive Pulmonary Disease Mother      Heart Disease Father         HEART VALVE REPLACEMENT     Hypertension Father      Diabetes Father      Depression Father      Respiratory Sister      Allergies Sister      Depression Sister      Respiratory Sister      Allergies Sister      Depression Sister      Respiratory Brother      Allergies Brother      Kidney Disease No family hx of        Review of Systems  As per HPI and PMHx, otherwise 10 system review including the head and neck, constitutional, eyes, respiratory, GI, skin, neurologic, lymphatic, endocrine, and allergy systems is negative.    Physical Exam  BP (!) 142/78 (BP Location: Right arm, Patient Position: Chair, Cuff Size: Adult Large)   Pulse 85   Temp 98.4  F (36.9  C) (Tympanic)   Wt 91.6 kg (202 lb)   SpO2 98%   BMI 35.78 kg/m    GENERAL: Patient is a pleasant, cooperative 36 year old female in no acute distress.  HEAD: Normocephalic, atraumatic.  Hair and scalp are normal.  EYES: Pupils are equal, round, reactive to light and accommodation.  Extraocular movements are intact.  The sclera nonicteric without injection.  The extraocular structures are normal.  EARS: See below.   NEUROLOGIC: Cranial nerves II through XII are grossly intact.  Voice is strong.  Facial nerve examination incomplete due to the patient wearing a mask.  CARDIOVASCULAR: Extremities are warm and well-perfused.  No significant peripheral edema.  RESPIRATORY: Patient has nonlabored breathing without cough, wheeze, stridor.  PSYCHIATRIC: Patient is alert and oriented.  Mood and affect appear normal.  SKIN: Warm and dry.  No scalp, face, or neck lesions noted.     Physical Exam and Procedure     EARS: Normal  shape and symmetry.  No tenderness when palpating the mastoid or tragal areas bilaterally.  The ears were then examined under the otic binocular microscope.  Otoscopic exam on the right reveals a mostly clear canal.  The right tympanic membrane is intact with some slight attic retraction.  No deep retraction pockets.  In the posterior-inferior quadrant, there is a slight amount of granularity still the tympanic membrane without reji granulation tissue or active drainage.  No significant tympanic membrane retraction or evidence of cholesteatoma.           Attention was turned to the left ear.  Otoscopic exam on the left reveals a mostly clear canal with some slight dry cerumen.  The granulation tissue has resolved.  There is no drainage or active otorrhea.  The left tympanic membrane is intact with moderately severe severe retraction anteriorly onto the middle ear structures, slight attic retraction without deep retraction pockets or evidence of cholesteatoma.        Assessment and Plan     ICD-10-CM    1. Granuloma of tympanic membrane, bilateral  H71.13 Microscopy, Binocular     COMPOUNDED NON-CONTROLLED SUBSTANCE (CMPD RX) - PHARMACY TO MIX COMPOUNDED MEDICATION     CANCELED: Remove Cerumen   2. Chronic Eustachian tube dysfunction, bilateral  H69.83 Microscopy, Binocular     COMPOUNDED NON-CONTROLLED SUBSTANCE (CMPD RX) - PHARMACY TO MIX COMPOUNDED MEDICATION     CANCELED: Remove Cerumen   3. Retracted tympanic membrane, bilateral  H73.893 Microscopy, Binocular     COMPOUNDED NON-CONTROLLED SUBSTANCE (CMPD RX) - PHARMACY TO MIX COMPOUNDED MEDICATION     CANCELED: Remove Cerumen   4. Tobacco dependence syndrome  F17.200 Microscopy, Binocular     COMPOUNDED NON-CONTROLLED SUBSTANCE (CMPD RX) - PHARMACY TO MIX COMPOUNDED MEDICATION     CANCELED: Remove Cerumen   5. Encounter for tobacco use cessation counseling  Z71.6 Microscopy, Binocular     COMPOUNDED NON-CONTROLLED SUBSTANCE (CMPD RX) - PHARMACY TO MIX  COMPOUNDED MEDICATION     CANCELED: Remove Cerumen      It was my pleasure seeing Divina Delgadillo today in clinic.  Overall, the patient's ears appear much improved.  She still has a little bit of granularity on the right-hand side.  We discussed a trial of ear powder to see if that would help keep moisture out of the ears and resolve her symptoms more quickly.  I sent a prescription for the compounding pharmacy and gave her an otic insufflator.  I like to see her back in 4 to 6 weeks to recheck the ears or sooner if her symptoms warrant.  We discussed water precautions for the ear and not placing anything in the ear except for eardrops.    Divina to follow up with Primary Care provider regarding elevated blood pressure.    Migue Antunez MD  Department of Otolaryngology-Head and Neck Surgery  [facility]         Again, thank you for allowing me to participate in the care of your patient.        Sincerely,        Migue Antunez MD

## 2022-02-18 DIAGNOSIS — E11.42 DIABETIC POLYNEUROPATHY ASSOCIATED WITH TYPE 2 DIABETES MELLITUS (H): ICD-10-CM

## 2022-02-18 DIAGNOSIS — I10 ESSENTIAL HYPERTENSION: Chronic | ICD-10-CM

## 2022-02-18 RX ORDER — CARVEDILOL 6.25 MG/1
6.25 TABLET ORAL 2 TIMES DAILY
Qty: 60 TABLET | Refills: 3 | Status: SHIPPED | OUTPATIENT
Start: 2022-02-18 | End: 2022-07-06

## 2022-02-18 RX ORDER — PREGABALIN 50 MG/1
50 CAPSULE ORAL 2 TIMES DAILY
Qty: 60 CAPSULE | Refills: 1 | Status: SHIPPED | OUTPATIENT
Start: 2022-02-18 | End: 2022-04-20

## 2022-02-21 ENCOUNTER — TELEPHONE (OUTPATIENT)
Dept: FAMILY MEDICINE | Facility: CLINIC | Age: 37
End: 2022-02-21
Payer: MEDICARE

## 2022-02-21 DIAGNOSIS — Z79.4 TYPE 2 DIABETES MELLITUS WITH STAGE 3B CHRONIC KIDNEY DISEASE, WITH LONG-TERM CURRENT USE OF INSULIN (H): ICD-10-CM

## 2022-02-21 DIAGNOSIS — E11.22 TYPE 2 DIABETES MELLITUS WITH STAGE 3B CHRONIC KIDNEY DISEASE, WITH LONG-TERM CURRENT USE OF INSULIN (H): ICD-10-CM

## 2022-02-21 DIAGNOSIS — N18.32 TYPE 2 DIABETES MELLITUS WITH STAGE 3B CHRONIC KIDNEY DISEASE, WITH LONG-TERM CURRENT USE OF INSULIN (H): ICD-10-CM

## 2022-02-21 RX ORDER — FLURBIPROFEN SODIUM 0.3 MG/ML
SOLUTION/ DROPS OPHTHALMIC
Qty: 100 EACH | Refills: 10 | Status: SHIPPED | OUTPATIENT
Start: 2022-02-21 | End: 2022-12-21

## 2022-02-21 NOTE — TELEPHONE ENCOUNTER
Patient states she has one pen needle left, [facility]    Thank you,    Lucila Jacobs, FRANCISCO Sheriff

## 2022-02-23 DIAGNOSIS — J45.21 MILD INTERMITTENT ASTHMA WITH ACUTE EXACERBATION: ICD-10-CM

## 2022-02-24 ENCOUNTER — HOSPITAL ENCOUNTER (OUTPATIENT)
Dept: CARDIOLOGY | Facility: CLINIC | Age: 37
Discharge: HOME | End: 2022-02-24
Attending: NURSE PRACTITIONER | Admitting: NURSE PRACTITIONER
Payer: MEDICARE

## 2022-02-24 DIAGNOSIS — R60.0 BILATERAL LEG EDEMA: ICD-10-CM

## 2022-02-24 DIAGNOSIS — R01.1 HEART MURMUR: ICD-10-CM

## 2022-02-24 LAB — LVEF ECHO: NORMAL

## 2022-02-24 PROCEDURE — 93306 TTE W/DOPPLER COMPLETE: CPT | Mod: 26 | Performed by: INTERNAL MEDICINE

## 2022-02-24 PROCEDURE — 93306 TTE W/DOPPLER COMPLETE: CPT

## 2022-02-24 RX ORDER — MONTELUKAST SODIUM 10 MG/1
TABLET ORAL
Qty: 28 TABLET | Refills: 2 | Status: SHIPPED | OUTPATIENT
Start: 2022-02-24 | End: 2022-05-11

## 2022-03-03 ENCOUNTER — LAB (OUTPATIENT)
Dept: LAB | Facility: CLINIC | Age: 37
End: 2022-03-03
Payer: MEDICARE

## 2022-03-03 DIAGNOSIS — F20.9 SCHIZOPHRENIA, UNSPECIFIED TYPE (H): ICD-10-CM

## 2022-03-03 LAB
BASOPHILS # BLD AUTO: 0.1 10E3/UL (ref 0–0.2)
BASOPHILS NFR BLD AUTO: 1 %
EOSINOPHIL # BLD AUTO: 0.2 10E3/UL (ref 0–0.7)
EOSINOPHIL NFR BLD AUTO: 1 %
ERYTHROCYTE [DISTWIDTH] IN BLOOD BY AUTOMATED COUNT: 14.1 % (ref 10–15)
HCT VFR BLD AUTO: 38.6 % (ref 35–47)
HGB BLD-MCNC: 12.4 G/DL (ref 11.7–15.7)
LYMPHOCYTES # BLD AUTO: 4.4 10E3/UL (ref 0.8–5.3)
LYMPHOCYTES NFR BLD AUTO: 30 %
MCH RBC QN AUTO: 30 PG (ref 26.5–33)
MCHC RBC AUTO-ENTMCNC: 32.1 G/DL (ref 31.5–36.5)
MCV RBC AUTO: 94 FL (ref 78–100)
MONOCYTES # BLD AUTO: 1.2 10E3/UL (ref 0–1.3)
MONOCYTES NFR BLD AUTO: 8 %
NEUTROPHILS # BLD AUTO: 8.9 10E3/UL (ref 1.6–8.3)
NEUTROPHILS NFR BLD AUTO: 60 %
PLATELET # BLD AUTO: 392 10E3/UL (ref 150–450)
RBC # BLD AUTO: 4.13 10E6/UL (ref 3.8–5.2)
WBC # BLD AUTO: 14.7 10E3/UL (ref 4–11)

## 2022-03-03 PROCEDURE — 85025 COMPLETE CBC W/AUTO DIFF WBC: CPT

## 2022-03-03 PROCEDURE — 36416 COLLJ CAPILLARY BLOOD SPEC: CPT

## 2022-03-07 ENCOUNTER — VIRTUAL VISIT (OUTPATIENT)
Dept: EDUCATION SERVICES | Facility: CLINIC | Age: 37
End: 2022-03-07
Payer: MEDICARE

## 2022-03-07 DIAGNOSIS — E11.9 TYPE 2 DIABETES MELLITUS (H): Primary | ICD-10-CM

## 2022-03-07 NOTE — PROGRESS NOTES
Diabetes Follow-up    Subjective/Objective:  Divina is moving into an apartment soon on her own and out of the group home.  She can't visit today due to another meeting.  Rescheduled phone visit for next month.  Just started using the Dexcom and really likes it.  Notes she didn't realize how much the blood sugars fluctuate in between checks.  Reviewed looking at these patterns, what causes the highs, watching insulin action etc.   Is trying to drink more water and is looking forward to increasing exercise at the gym once she moves. Follow-up April 19th via phone     Dolly Riley RD, LD, [facility]  Diabetes Education  < 5 minutes

## 2022-03-17 ENCOUNTER — LAB (OUTPATIENT)
Dept: LAB | Facility: CLINIC | Age: 37
End: 2022-03-17
Payer: MEDICARE

## 2022-03-17 DIAGNOSIS — F20.9 SCHIZOPHRENIA, UNSPECIFIED TYPE (H): ICD-10-CM

## 2022-03-17 LAB
BASOPHILS # BLD AUTO: 0.2 10E3/UL (ref 0–0.2)
BASOPHILS NFR BLD AUTO: 1 %
EOSINOPHIL # BLD AUTO: 0.2 10E3/UL (ref 0–0.7)
EOSINOPHIL NFR BLD AUTO: 1 %
ERYTHROCYTE [DISTWIDTH] IN BLOOD BY AUTOMATED COUNT: 14.6 % (ref 10–15)
HCT VFR BLD AUTO: 38.9 % (ref 35–47)
HGB BLD-MCNC: 12.5 G/DL (ref 11.7–15.7)
IMM GRANULOCYTES # BLD: 0.1 10E3/UL
IMM GRANULOCYTES NFR BLD: 1 %
LYMPHOCYTES # BLD AUTO: 5.4 10E3/UL (ref 0.8–5.3)
LYMPHOCYTES NFR BLD AUTO: 31 %
MCH RBC QN AUTO: 30.3 PG (ref 26.5–33)
MCHC RBC AUTO-ENTMCNC: 32.1 G/DL (ref 31.5–36.5)
MCV RBC AUTO: 94 FL (ref 78–100)
MONOCYTES # BLD AUTO: 1.1 10E3/UL (ref 0–1.3)
MONOCYTES NFR BLD AUTO: 6 %
NEUTROPHILS # BLD AUTO: 10.8 10E3/UL (ref 1.6–8.3)
NEUTROPHILS NFR BLD AUTO: 60 %
NRBC # BLD AUTO: 0 10E3/UL
NRBC BLD AUTO-RTO: 0 /100
PLAT MORPH BLD: NORMAL
PLATELET # BLD AUTO: 349 10E3/UL (ref 150–450)
RBC # BLD AUTO: 4.13 10E6/UL (ref 3.8–5.2)
RBC MORPH BLD: NORMAL
WBC # BLD AUTO: 17.7 10E3/UL (ref 4–11)

## 2022-03-17 PROCEDURE — 85025 COMPLETE CBC W/AUTO DIFF WBC: CPT

## 2022-03-17 PROCEDURE — 36415 COLL VENOUS BLD VENIPUNCTURE: CPT

## 2022-04-13 ENCOUNTER — LAB (OUTPATIENT)
Dept: LAB | Facility: CLINIC | Age: 37
End: 2022-04-13
Payer: MEDICARE

## 2022-04-13 DIAGNOSIS — F20.9 SCHIZOPHRENIA, UNSPECIFIED TYPE (H): ICD-10-CM

## 2022-04-13 DIAGNOSIS — J30.2 CHRONIC SEASONAL ALLERGIC RHINITIS: ICD-10-CM

## 2022-04-13 DIAGNOSIS — K83.1 CHOLESTATIC LIVER DISEASE (H): ICD-10-CM

## 2022-04-13 LAB
ALBUMIN SERPL-MCNC: 3.1 G/DL (ref 3.4–5)
ALP SERPL-CCNC: 181 U/L (ref 40–150)
ALT SERPL W P-5'-P-CCNC: 60 U/L (ref 0–50)
AST SERPL W P-5'-P-CCNC: 38 U/L (ref 0–45)
BASOPHILS # BLD AUTO: 0.2 10E3/UL (ref 0–0.2)
BASOPHILS NFR BLD AUTO: 1 %
BILIRUB DIRECT SERPL-MCNC: <0.1 MG/DL (ref 0–0.2)
BILIRUB SERPL-MCNC: 0.2 MG/DL (ref 0.2–1.3)
EOSINOPHIL # BLD AUTO: 0.3 10E3/UL (ref 0–0.7)
EOSINOPHIL NFR BLD AUTO: 2 %
ERYTHROCYTE [DISTWIDTH] IN BLOOD BY AUTOMATED COUNT: 15.1 % (ref 10–15)
HCT VFR BLD AUTO: 37.3 % (ref 35–47)
HGB BLD-MCNC: 11.8 G/DL (ref 11.7–15.7)
IMM GRANULOCYTES # BLD: 0.2 10E3/UL
IMM GRANULOCYTES NFR BLD: 1 %
LYMPHOCYTES # BLD AUTO: 5.9 10E3/UL (ref 0.8–5.3)
LYMPHOCYTES NFR BLD AUTO: 33 %
MCH RBC QN AUTO: 29.6 PG (ref 26.5–33)
MCHC RBC AUTO-ENTMCNC: 31.6 G/DL (ref 31.5–36.5)
MCV RBC AUTO: 94 FL (ref 78–100)
MONOCYTES # BLD AUTO: 1.4 10E3/UL (ref 0–1.3)
MONOCYTES NFR BLD AUTO: 8 %
NEUTROPHILS # BLD AUTO: 9.9 10E3/UL (ref 1.6–8.3)
NEUTROPHILS NFR BLD AUTO: 55 %
NRBC # BLD AUTO: 0 10E3/UL
NRBC BLD AUTO-RTO: 0 /100
PLAT MORPH BLD: NORMAL
PLATELET # BLD AUTO: 362 10E3/UL (ref 150–450)
PROT SERPL-MCNC: 7.6 G/DL (ref 6.8–8.8)
RBC # BLD AUTO: 3.99 10E6/UL (ref 3.8–5.2)
RBC MORPH BLD: NORMAL
WBC # BLD AUTO: 18 10E3/UL (ref 4–11)

## 2022-04-13 PROCEDURE — 85025 COMPLETE CBC W/AUTO DIFF WBC: CPT

## 2022-04-13 PROCEDURE — 80076 HEPATIC FUNCTION PANEL: CPT

## 2022-04-13 PROCEDURE — 36415 COLL VENOUS BLD VENIPUNCTURE: CPT

## 2022-04-13 RX ORDER — PREGABALIN 50 MG/1
CAPSULE ORAL
Qty: 60 CAPSULE | Refills: 1 | OUTPATIENT
Start: 2022-04-13

## 2022-04-13 NOTE — TELEPHONE ENCOUNTER
Pending Prescriptions:                       Disp   Refills    pregabalin (LYRICA) 50 MG capsule [Pharma*56 cap*4            Sig: TAKE ONE  CAPSULE BY MOUTH TWICE DAILY    Routing refill request to provider for review/approval because:  Drug not on the [facility] refill protocol       Joel Vu RN

## 2022-04-13 NOTE — TELEPHONE ENCOUNTER
This is wrong chart, please close this chart, patient have different chart with [facility], please use this chart for any future refills and cares: MRN [id number]    BRUNO Ramos CNP

## 2022-04-14 RX ORDER — LORATADINE 10 MG/1
TABLET ORAL
Qty: 28 TABLET | Refills: 10 | Status: SHIPPED | OUTPATIENT
Start: 2022-04-14 | End: 2023-06-01

## 2022-04-18 ENCOUNTER — VIRTUAL VISIT (OUTPATIENT)
Dept: EDUCATION SERVICES | Facility: CLINIC | Age: 37
End: 2022-04-18
Payer: MEDICARE

## 2022-04-18 DIAGNOSIS — E11.9 TYPE 2 DIABETES MELLITUS (H): Primary | ICD-10-CM

## 2022-04-18 PROCEDURE — 98967 PH1 ASSMT&MGMT NQHP 11-20: CPT | Mod: 95 | Performed by: DIETITIAN, REGISTERED

## 2022-04-18 NOTE — PROGRESS NOTES
Diabetes Education Follow-up    Subjective/Objective:    Diabetes is being managed with   Lifestyle (diet/activity), Diabetes Medications   Diabetes Medication(s)     Diabetic Other       glucose (BD GLUCOSE) 4 g chewable tablet    Take 1 tablet by mouth every hour as needed for low blood sugar    Insulin       insulin glargine (BASAGLAR KWIKPEN) 100 UNIT/ML pen    Inject 27 Units Subcutaneous At Bedtime     NOVOLOG FLEXPEN 100 UNIT/ML soln    INJECT 1 TO 5 UNITS SUB Q THREE TIMES DAILY WITH MEALS PER SLIDING SCALE    Incretin Mimetic Agents (GLP-1 Receptor Agonists)       Semaglutide, 1 MG/DOSE, (OZEMPIC, 1 MG/DOSE,) 4 MG/3ML SOPN    Inject 1 mg Subcutaneous once a week      Basaglar in morning       Lab Results   Component Value Date    A1C 8.2 01/12/2022    A1C 7.8 10/28/2021    A1C 7.4 07/08/2021    A1C 8.0 03/26/2021    A1C 9.0 08/14/2020    A1C 7.8 05/18/2020    A1C 7.4 02/17/2020       Assessment:  Reviewed patterns on dexcom and insulin dosing.  Divina has been using a sliding scale and usually takes it at the post prandial peak; has a dexcom alarm at 250 and will usually give then.  Often is taking 2-3 units and this correction works well brinign blood sugars back into the 100s.  Notes blood sugars will drop if skipping breakfast, which could be indicative of too much basal insulin.  Half the time will have coffee with hot cocoa and creamer which cuases a rise to the 2-300s in the morning.   Feels that most meals that have carbohydrates will cause a rise to the 2-300s and is needing to correct at least 2 x daily.   Discussed insulin action; basal and rapid acting insulin.  Reviewed the concepts of correcting, correcting after a meal, post prandial rises wihtout insulin.  Discussed how the next step will be taking the insulin pre meals to help prevent the rise and minimizing need for correction as this will improve time in target.      Telephone encounter to PCP  Begin dosing Novolog before meals  1 unit for  a small/medium meal and 2 units for an average/ large meal. (will   Adjust sliding scale to PRE meals and give 1 unit for 200-250 and 2 units for 251+.     Adjust Basalgar from 27-0-0-0 to 23-0-0-0 when beginning to dose Novolog pre meal          Plan/Response:  Telephone encounter to PCP  Set monthly follow ups via phone     Dolly Riley RD, LD, [facility]  Diabetes Education  Time spent 17 minutes

## 2022-04-19 ENCOUNTER — TELEPHONE (OUTPATIENT)
Dept: EDUCATION SERVICES | Facility: CLINIC | Age: 37
End: 2022-04-19
Payer: MEDICARE

## 2022-04-19 NOTE — TELEPHONE ENCOUNTER
Ryan Lazcano,     Please note if you approve of this plan or indicate an alternate plan.  Divina has only been using sliding scale insulin about 2 hours after eating when her Dexcom alarm goes off at 250mg/dL.  She notes most meals give a rise into the 2-300s.     1.  OK to begin dosing Novolog before meals  1 unit for a small/medium meal and 2 units for an average/ large meal. (will likely increase)   2.  Adjust sliding scale to PRE meals and give 1 unit for 200-250 and 2 units for 251+.    This keeps max meal dose at 4 units which is the highest amount Divina ever gives (normally 2-3 units post meal)   3.  Adjust Basalgar from 27-0-0-0 to 23-0-0-0 when beginning to dose Novolog pre meal   (notes this pulls her down low if skipping breakfast and will allow more room for Novolog adjustment)       Thanks!  Katiana Riley RD, LD, [facility]  Diabetes Education

## 2022-04-20 ENCOUNTER — VIRTUAL VISIT (OUTPATIENT)
Dept: FAMILY MEDICINE | Facility: CLINIC | Age: 37
End: 2022-04-20
Payer: MEDICARE

## 2022-04-20 VITALS — DIASTOLIC BLOOD PRESSURE: 72 MMHG | SYSTOLIC BLOOD PRESSURE: 136 MMHG

## 2022-04-20 DIAGNOSIS — J45.21 MILD INTERMITTENT ASTHMA WITH ACUTE EXACERBATION: ICD-10-CM

## 2022-04-20 PROCEDURE — 99442 PR PHYSICIAN TELEPHONE EVALUATION 11-20 MIN: CPT | Mod: 95 | Performed by: NURSE PRACTITIONER

## 2022-04-20 RX ORDER — ALBUTEROL SULFATE 90 UG/1
AEROSOL, METERED RESPIRATORY (INHALATION)
Qty: 18 G | Refills: 10 | Status: SHIPPED | OUTPATIENT
Start: 2022-04-20

## 2022-04-20 RX ORDER — INSULIN ASPART 100 [IU]/ML
1-4 INJECTION, SOLUTION INTRAVENOUS; SUBCUTANEOUS
Qty: 9 ML | Refills: 1 | Status: SHIPPED | OUTPATIENT
Start: 2022-04-20 | End: 2022-09-09

## 2022-04-20 NOTE — PROGRESS NOTES
"Divina is a 37 year old who is being evaluated via a billable telephone visit.      What phone number would you like to be contacted at? [phone]   How would you like to obtain your AVS? MyChart    Assessment & Plan     Uncontrolled type 2 diabetes mellitus with diabetic polyneuropathy, with long-term current use of insulin (H)  -Divian reports that blood sugar readings improved since her last visit, she is following diabetic educator and gets help with insulin adjustments.   -recommended to schedule lab appointment for A1C recheck, lipid panel and renal function    - insulin aspart (NOVOLOG FLEXPEN) 100 UNIT/ML pen; Inject 1-4 Units Subcutaneous 3 times daily (with meals) 1 units for small meal, 2 units for larger meal plus use sliding scale, maximum 4 units with meal, or up to 12 units per day  - Hemoglobin A1c; Future  - Lipid panel reflex to direct LDL Fasting; Future  - Basic metabolic panel  (Ca, Cl, CO2, Creat, Gluc, K, Na, BUN); Future    Mild intermittent asthma with acute exacerbation  -well controlled   - albuterol (VENTOLIN HFA) 108 (90 Base) MCG/ACT inhaler; INHALE 2 PUFFS INTO THE LUNGS EVERY SIX  HOURS AS NEEDED FOR SHORTNESS OF BREATH    [id number]}     Tobacco Cessation:   reports that she quit smoking about 2 years ago. Her smoking use included cigarettes. She has a 4.00 pack-year smoking history. She has never used smokeless tobacco.      BMI:   Estimated body mass index is 35.78 kg/m  as calculated from the following:    Height as of 2/2/22: 1.6 m (5' 3\").    Weight as of 2/11/22: 91.6 kg (202 lb).   Weight management plan: Discussed healthy diet and exercise guidelines    Work on weight loss  Regular exercise  See Patient Instructions    Return in about 1 week (around 4/27/2022) for Lab Work.    VERONIQUE Spears [facility]    Rhiannon Murcia is a 37 year old who presents for the following health issues diabetes follow up, refills. Still having discomfort in her " legs and states Lyrica is not helping, would like to stop Lyrica.                   Review of Systems   Constitutional, HEENT, cardiovascular, pulmonary, gi and gu systems are negative, except as otherwise noted.      Objective           Vitals:  No vitals were obtained today due to virtual visit.    Physical Exam   healthy, alert and no distress  PSYCH: Alert and oriented times 3; coherent speech, normal   rate and volume, able to articulate logical thoughts, able   to abstract reason, no tangential thoughts, no hallucinations   or delusions  Her affect is normal  RESP: No cough, no audible wheezing, able to talk in full sentences  Remainder of exam unable to be completed due to telephone visits                Phone call duration: 15 minutes    VERONIQUE Spears CNP

## 2022-04-21 ASSESSMENT — ASTHMA QUESTIONNAIRES
QUESTION_1 LAST FOUR WEEKS HOW MUCH OF THE TIME DID YOUR ASTHMA KEEP YOU FROM GETTING AS MUCH DONE AT WORK, SCHOOL OR AT HOME: NONE OF THE TIME
QUESTION_2 LAST FOUR WEEKS HOW OFTEN HAVE YOU HAD SHORTNESS OF BREATH: ONCE OR TWICE A WEEK
ACT_TOTALSCORE: 22
QUESTION_3 LAST FOUR WEEKS HOW OFTEN DID YOUR ASTHMA SYMPTOMS (WHEEZING, COUGHING, SHORTNESS OF BREATH, CHEST TIGHTNESS OR PAIN) WAKE YOU UP AT NIGHT OR EARLIER THAN USUAL IN THE MORNING: ONCE OR TWICE
QUESTION_5 LAST FOUR WEEKS HOW WOULD YOU RATE YOUR ASTHMA CONTROL: COMPLETELY CONTROLLED
QUESTION_4 LAST FOUR WEEKS HOW OFTEN HAVE YOU USED YOUR RESCUE INHALER OR NEBULIZER MEDICATION (SUCH AS ALBUTEROL): ONCE A WEEK OR LESS
ACT_TOTALSCORE: 22

## 2022-04-21 NOTE — TELEPHONE ENCOUNTER
Reviewed insulin instructions. Pt verbalized understanding of concepts discussed and recommendations provided.        Dolly Riley RD, LD, [facility]  Diabetes Education

## 2022-05-09 DIAGNOSIS — N18.32 TYPE 2 DIABETES MELLITUS WITH STAGE 3B CHRONIC KIDNEY DISEASE, WITH LONG-TERM CURRENT USE OF INSULIN (H): ICD-10-CM

## 2022-05-09 DIAGNOSIS — J45.21 MILD INTERMITTENT ASTHMA WITH ACUTE EXACERBATION: ICD-10-CM

## 2022-05-09 DIAGNOSIS — I10 ESSENTIAL HYPERTENSION: Chronic | ICD-10-CM

## 2022-05-09 DIAGNOSIS — J30.2 CHRONIC SEASONAL ALLERGIC RHINITIS: ICD-10-CM

## 2022-05-09 DIAGNOSIS — K21.9 GASTROESOPHAGEAL REFLUX DISEASE WITHOUT ESOPHAGITIS: ICD-10-CM

## 2022-05-09 DIAGNOSIS — E78.5 HYPERLIPIDEMIA LDL GOAL <100: ICD-10-CM

## 2022-05-09 DIAGNOSIS — Z79.4 TYPE 2 DIABETES MELLITUS WITH STAGE 3B CHRONIC KIDNEY DISEASE, WITH LONG-TERM CURRENT USE OF INSULIN (H): ICD-10-CM

## 2022-05-09 DIAGNOSIS — E11.22 TYPE 2 DIABETES MELLITUS WITH STAGE 3B CHRONIC KIDNEY DISEASE, WITH LONG-TERM CURRENT USE OF INSULIN (H): ICD-10-CM

## 2022-05-09 NOTE — TELEPHONE ENCOUNTER
Reason for Call:  Medication or medication refill:    Do you use a [facility]?  Name of the pharmacy and phone number for the current request:  [facility] [place] [phone]    Name of the medication requested: Lamotrigine 100mg, Amlodipine 5mg, Atorvastatin, 10 mg, Montelukst 10mg, Omeprazole 20 mg, Loratidine 10 mg, Aripitrazole 30 mg, Fluoxetine 40mg, Lisinopril 5mg, and Carvediol 6.25mg 2x/daily        Other request: [facility] pharmacy [facility] will not transfer them, they are only pharmacy for group homes and do not transfer to [facility] [place]    Can we leave a detailed message on this number? YES    Phone number patient can be reached at: Cell number on file:    Telephone Information:   Mobile [phone]       Best Time: Any    Call taken on 5/9/2022 at 3:44 PM by Rebecca Lang

## 2022-05-11 ENCOUNTER — TELEPHONE (OUTPATIENT)
Dept: FAMILY MEDICINE | Facility: CLINIC | Age: 37
End: 2022-05-11
Payer: MEDICARE

## 2022-05-11 RX ORDER — MONTELUKAST SODIUM 10 MG/1
TABLET ORAL
Qty: 28 TABLET | Refills: 2 | Status: SHIPPED | OUTPATIENT
Start: 2022-05-11 | End: 2022-08-01

## 2022-05-11 RX ORDER — LISINOPRIL 5 MG/1
5 TABLET ORAL DAILY
Qty: 30 TABLET | Refills: 3 | OUTPATIENT
Start: 2022-05-11

## 2022-05-11 RX ORDER — ATORVASTATIN CALCIUM 10 MG/1
10 TABLET, FILM COATED ORAL DAILY
Qty: 28 TABLET | Refills: 1 | Status: SHIPPED | OUTPATIENT
Start: 2022-05-11 | End: 2022-07-05

## 2022-05-11 RX ORDER — CARVEDILOL 6.25 MG/1
6.25 TABLET ORAL 2 TIMES DAILY
Qty: 60 TABLET | Refills: 3 | OUTPATIENT
Start: 2022-05-11

## 2022-05-11 RX ORDER — LORATADINE 10 MG/1
1 TABLET ORAL EVERY MORNING
Qty: 28 TABLET | Refills: 10 | OUTPATIENT
Start: 2022-05-11

## 2022-05-11 RX ORDER — AMLODIPINE BESYLATE 5 MG/1
5 TABLET ORAL
Qty: 90 TABLET | Refills: 1 | OUTPATIENT
Start: 2022-05-11

## 2022-05-11 RX ORDER — FLUOXETINE 40 MG/1
40 CAPSULE ORAL DAILY
OUTPATIENT
Start: 2022-05-11

## 2022-05-11 RX ORDER — LAMOTRIGINE 100 MG/1
100 TABLET ORAL AT BEDTIME
OUTPATIENT
Start: 2022-05-11

## 2022-05-11 RX ORDER — ARIPIPRAZOLE 30 MG/1
30 TABLET ORAL DAILY
OUTPATIENT
Start: 2022-05-11

## 2022-05-11 NOTE — TELEPHONE ENCOUNTER
Called Pt regarding other telephone encounter with refills being sent to [facility]. Let the patient know that her medications have been sent to the [facility] pharmacy.   Thais Pagan RN

## 2022-05-11 NOTE — TELEPHONE ENCOUNTER
Called [facility] pharmacy to verify what medication that they need sent over. After reading Pt medications of it was verified that the only medications that they cannot fill are Atorvastatin 10 mg and Montelukast 10 mg , due to their being no refills left on file with the other pharmacy.   Thais Pagan RN

## 2022-05-12 ENCOUNTER — TELEPHONE (OUTPATIENT)
Dept: FAMILY MEDICINE | Facility: CLINIC | Age: 37
End: 2022-05-12
Payer: MEDICARE

## 2022-05-12 DIAGNOSIS — N18.31 TYPE 2 DIABETES MELLITUS WITH STAGE 3A CHRONIC KIDNEY DISEASE, WITH LONG-TERM CURRENT USE OF INSULIN (H): Primary | ICD-10-CM

## 2022-05-12 DIAGNOSIS — E11.22 TYPE 2 DIABETES MELLITUS WITH STAGE 3A CHRONIC KIDNEY DISEASE, WITH LONG-TERM CURRENT USE OF INSULIN (H): Primary | ICD-10-CM

## 2022-05-12 DIAGNOSIS — Z79.4 TYPE 2 DIABETES MELLITUS WITH STAGE 3A CHRONIC KIDNEY DISEASE, WITH LONG-TERM CURRENT USE OF INSULIN (H): Primary | ICD-10-CM

## 2022-05-12 NOTE — TELEPHONE ENCOUNTER
Pending Prescriptions:                       Disp   Refills    Continuous Blood Gluc Sensor (DEXCOM G6 S*9 each 1            Si each every 10 days Change every 10 days. USE TO           READ BLOOD SUGARS PER  INSTRUCTIONS    Continuous Blood Gluc Transmit (DEXCOM G6*1 each 1            Si each every 3 months Change every 3 months. USE           TO READ BLOOD SUGARS PER            INSTRUCTIONS      [facility] [address] [address] [address]  Phone: [phone] Fax: [phone]

## 2022-05-13 NOTE — TELEPHONE ENCOUNTER
Prior Authorization Retail Medication Request    Medication/Dose: Basaglar kwikpen  ICD code (if different than what is on RX):    Previously Tried and Failed:  Trulicity; glipizide; novolog; humulin n; novolin r; lantus solostar; metformin; ozempic;   Rationale:  Patient has used since 2020    Insurance Name:  Not provided  Insurance ID:  Not provided  CMM key: [id number]      Pharmacy Information (if different than what is on RX)  Name:    Phone:

## 2022-05-13 NOTE — TELEPHONE ENCOUNTER
Prior Authorization Retail Medication Request    Medication/Dose: Dexcom G6 transmitter  ICD code (if different than what is on RX):    Previously Tried and Failed:    Rationale:      Insurance Name:  Not provided  Insurance ID:  Not provided  [facility] Key: [id number]    Pharmacy Information (if different than what is on RX)  Name:    Phone:

## 2022-05-13 NOTE — TELEPHONE ENCOUNTER
Prior Authorization Retail Medication Request    Medication/Dose: Dexcom G6 sensor  ICD code (if different than what is on RX):    Previously Tried and Failed:    Rationale:      Insurance Name:  Not provided  Insurance ID:  Not provided  [facility] Key: [id number]      Pharmacy Information (if different than what is on RX)  Name:    Phone:

## 2022-05-16 RX ORDER — PROCHLORPERAZINE 25 MG/1
1 SUPPOSITORY RECTAL
Qty: 1 EACH | Refills: 1 | Status: SHIPPED | OUTPATIENT
Start: 2022-05-16 | End: 2022-08-10

## 2022-05-16 RX ORDER — PROCHLORPERAZINE 25 MG/1
1 SUPPOSITORY RECTAL
Qty: 9 EACH | Refills: 1 | Status: SHIPPED | OUTPATIENT
Start: 2022-05-16 | End: 2022-08-10

## 2022-05-16 NOTE — TELEPHONE ENCOUNTER
Prescription approved per [facility] Refill Protocol.    Mer Arthur RN  [facility]

## 2022-05-17 ENCOUNTER — TELEPHONE (OUTPATIENT)
Dept: FAMILY MEDICINE | Facility: CLINIC | Age: 37
End: 2022-05-17
Payer: MEDICARE

## 2022-05-17 DIAGNOSIS — N18.31 TYPE 2 DIABETES MELLITUS WITH STAGE 3A CHRONIC KIDNEY DISEASE, WITH LONG-TERM CURRENT USE OF INSULIN (H): ICD-10-CM

## 2022-05-17 DIAGNOSIS — Z79.4 TYPE 2 DIABETES MELLITUS WITH STAGE 3A CHRONIC KIDNEY DISEASE, WITH LONG-TERM CURRENT USE OF INSULIN (H): ICD-10-CM

## 2022-05-17 DIAGNOSIS — E11.22 TYPE 2 DIABETES MELLITUS WITH STAGE 3A CHRONIC KIDNEY DISEASE, WITH LONG-TERM CURRENT USE OF INSULIN (H): ICD-10-CM

## 2022-05-17 RX ORDER — BLOOD SUGAR DIAGNOSTIC
STRIP MISCELLANEOUS
Qty: 150 STRIP | Refills: 1 | Status: SHIPPED | OUTPATIENT
Start: 2022-05-17

## 2022-05-17 NOTE — TELEPHONE ENCOUNTER
Unable to leave a message on phone.  Patient does not check my chart messages Attempted the [facility] home listed on chart but she does not live there any more. Debora LOPEZ RN

## 2022-05-17 NOTE — TELEPHONE ENCOUNTER
Reason for Call:  Medication or medication refill:    Do you use a [facility]?  Name of the pharmacy and phone number for the current request:  [facility] [phone]    Name of the medication requested: Accu Chek Guide Test Strips    Other request: Patient is waiting on Prior Auth for Dexcom, needs Accu Chek Guide test strips while she is waiting    Can we leave a detailed message on this number? YES    Phone number patient can be reached at: Cell number on file:    Telephone Information:   Mobile [phone]       Best Time: Any    Call taken on 5/17/2022 at 12:43 PM by Rebecca Lang

## 2022-05-18 ENCOUNTER — TELEPHONE (OUTPATIENT)
Dept: FAMILY MEDICINE | Facility: CLINIC | Age: 37
End: 2022-05-18

## 2022-05-18 NOTE — TELEPHONE ENCOUNTER
PA Initiation    Medication: Basaglar kwikpen  Insurance Company: [employer] - Phone [phone] Fax [phone]  Pharmacy Filling the Rx: [facility] [place] - [address] [facility]  Filling Pharmacy Phone: [phone]  Filling Pharmacy Fax:    Start Date: 5/18/2022    Central Prior Authorization Team   Phone: [phone]

## 2022-05-18 NOTE — TELEPHONE ENCOUNTER
Prior Authorization Approval    Authorization Effective Date: 04/18/2022   Authorization Expiration Date:  05/17/2025  Medication: Dexcom G6 transmitter  Approved Dose/Quantity: 1  Reference #:     Insurance Company: [employer] - Phone [phone] Fax [phone]  Expected CoPay:       CoPay Card Available:      Foundation Assistance Needed:    Which Pharmacy is filling the prescription (Not needed for infusion/clinic administered): [facility] - [address]  Pharmacy Notified:  Yes  Patient Notified:  Pharmacy will notify patient.              PA Initiation    Medication: Dexcom G6 transmitter  Insurance Company: [employer] - Phone [phone] Fax [phone]  Pharmacy Filling the Rx: [facility] - [address]  Filling Pharmacy Phone: [phone]  Filling Pharmacy Fax:    Start Date: 5/18/2022    Central Prior Authorization Team   Phone: [phone]

## 2022-05-19 ENCOUNTER — LAB (OUTPATIENT)
Dept: LAB | Facility: CLINIC | Age: 37
End: 2022-05-19
Payer: COMMERCIAL

## 2022-05-19 DIAGNOSIS — F20.9 SCHIZOPHRENIA, UNSPECIFIED TYPE (H): ICD-10-CM

## 2022-05-19 LAB
BASOPHILS # BLD AUTO: 0.1 10E3/UL (ref 0–0.2)
BASOPHILS NFR BLD AUTO: 1 %
EOSINOPHIL # BLD AUTO: 0.2 10E3/UL (ref 0–0.7)
EOSINOPHIL NFR BLD AUTO: 1 %
ERYTHROCYTE [DISTWIDTH] IN BLOOD BY AUTOMATED COUNT: 15 % (ref 10–15)
HCT VFR BLD AUTO: 39.6 % (ref 35–47)
HGB BLD-MCNC: 12.6 G/DL (ref 11.7–15.7)
LYMPHOCYTES # BLD AUTO: 4.7 10E3/UL (ref 0.8–5.3)
LYMPHOCYTES NFR BLD AUTO: 30 %
MCH RBC QN AUTO: 29.8 PG (ref 26.5–33)
MCHC RBC AUTO-ENTMCNC: 31.8 G/DL (ref 31.5–36.5)
MCV RBC AUTO: 94 FL (ref 78–100)
MONOCYTES # BLD AUTO: 1.2 10E3/UL (ref 0–1.3)
MONOCYTES NFR BLD AUTO: 8 %
NEUTROPHILS # BLD AUTO: 9.8 10E3/UL (ref 1.6–8.3)
NEUTROPHILS NFR BLD AUTO: 61 %
PLATELET # BLD AUTO: 383 10E3/UL (ref 150–450)
RBC # BLD AUTO: 4.23 10E6/UL (ref 3.8–5.2)
WBC # BLD AUTO: 16 10E3/UL (ref 4–11)

## 2022-05-19 PROCEDURE — 85025 COMPLETE CBC W/AUTO DIFF WBC: CPT

## 2022-05-19 PROCEDURE — 36415 COLL VENOUS BLD VENIPUNCTURE: CPT

## 2022-05-19 NOTE — TELEPHONE ENCOUNTER
PRIOR AUTHORIZATION DENIED    Medication: Basaglar kwikpen    Denial Date: 5/18/2022    Denial Rational:       Appeal Information:

## 2022-05-26 DIAGNOSIS — E11.22 TYPE 2 DIABETES MELLITUS WITH STAGE 3A CHRONIC KIDNEY DISEASE, WITH LONG-TERM CURRENT USE OF INSULIN (H): ICD-10-CM

## 2022-05-26 DIAGNOSIS — Z79.4 TYPE 2 DIABETES MELLITUS WITH STAGE 3A CHRONIC KIDNEY DISEASE, WITH LONG-TERM CURRENT USE OF INSULIN (H): ICD-10-CM

## 2022-05-26 DIAGNOSIS — N18.31 TYPE 2 DIABETES MELLITUS WITH STAGE 3A CHRONIC KIDNEY DISEASE, WITH LONG-TERM CURRENT USE OF INSULIN (H): ICD-10-CM

## 2022-05-27 RX ORDER — SEMAGLUTIDE 1.34 MG/ML
1 INJECTION, SOLUTION SUBCUTANEOUS
OUTPATIENT
Start: 2022-05-27

## 2022-05-27 NOTE — TELEPHONE ENCOUNTER
"Requested Prescriptions   Pending Prescriptions Disp Refills    OZEMPIC, 1 MG/DOSE, 2 MG/1.5ML pen [Pharmacy Med Name: Ozempic 1 mg/dose (2 mg/1.5 mL) subcutaneous pen injector] 9 mL 3     Sig: Inject 1 mg Subcutaneous every 7 days        GLP-1 Agonists Protocol Failed - 5/26/2022  1:44 PM        Failed - HgbA1C in past 3 or 6 months     If HgbA1C is 8 or greater, it needs to be on file within the past 3 months.  If less than 8, must be on file within the past 6 months.     Recent Labs   Lab Test 01/12/22  1505   A1C 8.2*             Failed - Normal serum creatinine on file in past 12 months     Recent Labs   Lab Test 01/12/22  1505 09/19/17  1503 09/01/17  1049   CR 1.36*   < >  --    CREAT  --   --  1.6*    < > = values in this interval not displayed.       Ok to refill medication if creatinine is low          Passed - Medication is active on med list        Passed - Patient is age 18 or older        Passed - No active pregnancy on record        Passed - No positive pregnancy test in past 12 months        Passed - Recent (6 mo) or future (30 days) visit within the authorizing provider's specialty     Patient had office visit in the last 6 months or has a visit in the next 30 days with authorizing provider.  See \"Patient Info\" tab in inbasket, or \"Choose Columns\" in Meds & Orders section of the refill encounter.                  "

## 2022-06-17 ENCOUNTER — MYC MEDICAL ADVICE (OUTPATIENT)
Dept: FAMILY MEDICINE | Facility: CLINIC | Age: 37
End: 2022-06-17

## 2022-06-17 ENCOUNTER — LAB (OUTPATIENT)
Dept: LAB | Facility: CLINIC | Age: 37
End: 2022-06-17
Payer: COMMERCIAL

## 2022-06-17 DIAGNOSIS — F20.9 SCHIZOPHRENIA, UNSPECIFIED TYPE (H): ICD-10-CM

## 2022-06-17 LAB
ANION GAP SERPL CALCULATED.3IONS-SCNC: 8 MMOL/L (ref 3–14)
BASOPHILS # BLD AUTO: 0.2 10E3/UL (ref 0–0.2)
BASOPHILS NFR BLD AUTO: 1 %
BUN SERPL-MCNC: 20 MG/DL (ref 7–30)
CALCIUM SERPL-MCNC: 8.5 MG/DL (ref 8.5–10.1)
CHLORIDE BLD-SCNC: 114 MMOL/L (ref 94–109)
CHOLEST SERPL-MCNC: 147 MG/DL
CO2 SERPL-SCNC: 22 MMOL/L (ref 20–32)
CREAT SERPL-MCNC: 1.18 MG/DL (ref 0.52–1.04)
EOSINOPHIL # BLD AUTO: 0.4 10E3/UL (ref 0–0.7)
EOSINOPHIL NFR BLD AUTO: 2 %
ERYTHROCYTE [DISTWIDTH] IN BLOOD BY AUTOMATED COUNT: 15.1 % (ref 10–15)
FASTING STATUS PATIENT QL REPORTED: NO
GFR SERPL CREATININE-BSD FRML MDRD: 61 ML/MIN/1.73M2
GLUCOSE BLD-MCNC: 252 MG/DL (ref 70–99)
HBA1C MFR BLD: 9.2 % (ref 0–5.6)
HCT VFR BLD AUTO: 37.1 % (ref 35–47)
HDLC SERPL-MCNC: 60 MG/DL
HGB BLD-MCNC: 11.8 G/DL (ref 11.7–15.7)
IMM GRANULOCYTES # BLD: 0.2 10E3/UL
IMM GRANULOCYTES NFR BLD: 1 %
LDLC SERPL CALC-MCNC: 55 MG/DL
LYMPHOCYTES # BLD AUTO: 4.7 10E3/UL (ref 0.8–5.3)
LYMPHOCYTES NFR BLD AUTO: 27 %
MCH RBC QN AUTO: 29.9 PG (ref 26.5–33)
MCHC RBC AUTO-ENTMCNC: 31.8 G/DL (ref 31.5–36.5)
MCV RBC AUTO: 94 FL (ref 78–100)
MONOCYTES # BLD AUTO: 1.4 10E3/UL (ref 0–1.3)
MONOCYTES NFR BLD AUTO: 8 %
NEUTROPHILS # BLD AUTO: 10.4 10E3/UL (ref 1.6–8.3)
NEUTROPHILS NFR BLD AUTO: 60 %
NONHDLC SERPL-MCNC: 87 MG/DL
PLATELET # BLD AUTO: 447 10E3/UL (ref 150–450)
POTASSIUM BLD-SCNC: 4.5 MMOL/L (ref 3.4–5.3)
RBC # BLD AUTO: 3.94 10E6/UL (ref 3.8–5.2)
SODIUM SERPL-SCNC: 144 MMOL/L (ref 133–144)
TRIGL SERPL-MCNC: 162 MG/DL
WBC # BLD AUTO: 17.2 10E3/UL (ref 4–11)

## 2022-06-17 PROCEDURE — 85025 COMPLETE CBC W/AUTO DIFF WBC: CPT

## 2022-06-17 PROCEDURE — 83036 HEMOGLOBIN GLYCOSYLATED A1C: CPT

## 2022-06-17 PROCEDURE — 80048 BASIC METABOLIC PNL TOTAL CA: CPT

## 2022-06-17 PROCEDURE — 36415 COLL VENOUS BLD VENIPUNCTURE: CPT

## 2022-06-17 PROCEDURE — 80061 LIPID PANEL: CPT

## 2022-06-21 ENCOUNTER — HOSPITAL ENCOUNTER (EMERGENCY)
Facility: CLINIC | Age: 37
Discharge: HOME | End: 2022-06-22
Attending: EMERGENCY MEDICINE | Admitting: EMERGENCY MEDICINE
Payer: COMMERCIAL

## 2022-06-21 DIAGNOSIS — K85.10 ACUTE BILIARY PANCREATITIS, UNSPECIFIED COMPLICATION STATUS: ICD-10-CM

## 2022-06-21 LAB
ALBUMIN UR-MCNC: NEGATIVE MG/DL
APPEARANCE UR: CLEAR
BACTERIA #/AREA URNS HPF: ABNORMAL /HPF
BILIRUB UR QL STRIP: NEGATIVE
COLOR UR AUTO: YELLOW
GLUCOSE BLDC GLUCOMTR-MCNC: 407 MG/DL (ref 70–99)
GLUCOSE UR STRIP-MCNC: >499 MG/DL
HGB UR QL STRIP: NEGATIVE
HYALINE CASTS: 3 /LPF
KETONES UR STRIP-MCNC: NEGATIVE MG/DL
LEUKOCYTE ESTERASE UR QL STRIP: NEGATIVE
NITRATE UR QL: NEGATIVE
PH UR STRIP: 6 [PH] (ref 5–7)
RBC URINE: <1 /HPF
SP GR UR STRIP: 1.01 (ref 1–1.03)
SQUAMOUS EPITHELIAL: 1 /HPF
UROBILINOGEN UR STRIP-MCNC: NORMAL MG/DL
WBC URINE: <1 /HPF

## 2022-06-21 PROCEDURE — 96361 HYDRATE IV INFUSION ADD-ON: CPT | Performed by: EMERGENCY MEDICINE

## 2022-06-21 PROCEDURE — 81001 URINALYSIS AUTO W/SCOPE: CPT | Performed by: FAMILY MEDICINE

## 2022-06-21 PROCEDURE — 99284 EMERGENCY DEPT VISIT MOD MDM: CPT | Performed by: EMERGENCY MEDICINE

## 2022-06-21 PROCEDURE — 81001 URINALYSIS AUTO W/SCOPE: CPT | Performed by: EMERGENCY MEDICINE

## 2022-06-21 PROCEDURE — 96360 HYDRATION IV INFUSION INIT: CPT | Mod: 59 | Performed by: EMERGENCY MEDICINE

## 2022-06-21 PROCEDURE — 99285 EMERGENCY DEPT VISIT HI MDM: CPT | Mod: 25 | Performed by: EMERGENCY MEDICINE

## 2022-06-22 ENCOUNTER — PATIENT OUTREACH (OUTPATIENT)
Dept: FAMILY MEDICINE | Facility: CLINIC | Age: 37
End: 2022-06-22

## 2022-06-22 ENCOUNTER — APPOINTMENT (OUTPATIENT)
Dept: CT IMAGING | Facility: CLINIC | Age: 37
End: 2022-06-22
Attending: EMERGENCY MEDICINE
Payer: COMMERCIAL

## 2022-06-22 VITALS
DIASTOLIC BLOOD PRESSURE: 78 MMHG | HEIGHT: 64 IN | WEIGHT: 209 LBS | SYSTOLIC BLOOD PRESSURE: 170 MMHG | TEMPERATURE: 98.9 F | HEART RATE: 78 BPM | BODY MASS INDEX: 35.68 KG/M2 | OXYGEN SATURATION: 96 % | RESPIRATION RATE: 18 BRPM

## 2022-06-22 LAB
ALBUMIN SERPL-MCNC: 3 G/DL (ref 3.4–5)
ALP SERPL-CCNC: 189 U/L (ref 40–150)
ALT SERPL W P-5'-P-CCNC: 74 U/L (ref 0–50)
ANION GAP SERPL CALCULATED.3IONS-SCNC: 5 MMOL/L (ref 3–14)
AST SERPL W P-5'-P-CCNC: 22 U/L (ref 0–45)
BASE EXCESS BLDV CALC-SCNC: 1.1 MMOL/L (ref -7.7–1.9)
BASOPHILS # BLD AUTO: 0.1 10E3/UL (ref 0–0.2)
BASOPHILS NFR BLD AUTO: 1 %
BILIRUB SERPL-MCNC: 0.1 MG/DL (ref 0.2–1.3)
BUN SERPL-MCNC: 25 MG/DL (ref 7–30)
CALCIUM SERPL-MCNC: 8.8 MG/DL (ref 8.5–10.1)
CHLORIDE BLD-SCNC: 108 MMOL/L (ref 94–109)
CO2 SERPL-SCNC: 28 MMOL/L (ref 20–32)
CREAT SERPL-MCNC: 1.12 MG/DL (ref 0.52–1.04)
EOSINOPHIL # BLD AUTO: 0.4 10E3/UL (ref 0–0.7)
EOSINOPHIL NFR BLD AUTO: 2 %
ERYTHROCYTE [DISTWIDTH] IN BLOOD BY AUTOMATED COUNT: 14.9 % (ref 10–15)
GFR SERPL CREATININE-BSD FRML MDRD: 65 ML/MIN/1.73M2
GLUCOSE BLD-MCNC: 331 MG/DL (ref 70–99)
HCO3 BLDV-SCNC: 27 MMOL/L (ref 21–28)
HCT VFR BLD AUTO: 37 % (ref 35–47)
HGB BLD-MCNC: 12.3 G/DL (ref 11.7–15.7)
LIPASE SERPL-CCNC: 2179 U/L (ref 73–393)
LYMPHOCYTES # BLD AUTO: 4.9 10E3/UL (ref 0.8–5.3)
LYMPHOCYTES NFR BLD AUTO: 28 %
MCH RBC QN AUTO: 30.8 PG (ref 26.5–33)
MCHC RBC AUTO-ENTMCNC: 33.2 G/DL (ref 31.5–36.5)
MCV RBC AUTO: 93 FL (ref 78–100)
MONOCYTES # BLD AUTO: 1.4 10E3/UL (ref 0–1.3)
MONOCYTES NFR BLD AUTO: 8 %
NEUTROPHILS # BLD AUTO: 10.6 10E3/UL (ref 1.6–8.3)
NEUTROPHILS NFR BLD AUTO: 61 %
O2/TOTAL GAS SETTING VFR VENT: 21 %
PCO2 BLDV: 47 MM HG (ref 40–50)
PH BLDV: 7.37 [PH] (ref 7.32–7.43)
PLATELET # BLD AUTO: 428 10E3/UL (ref 150–450)
PO2 BLDV: 51 MM HG (ref 25–47)
POTASSIUM BLD-SCNC: 4.6 MMOL/L (ref 3.4–5.3)
PROT SERPL-MCNC: 7.7 G/DL (ref 6.8–8.8)
RBC # BLD AUTO: 4 10E6/UL (ref 3.8–5.2)
SODIUM SERPL-SCNC: 141 MMOL/L (ref 133–144)
WBC # BLD AUTO: 17.3 10E3/UL (ref 4–11)

## 2022-06-22 PROCEDURE — 80053 COMPREHEN METABOLIC PANEL: CPT | Performed by: EMERGENCY MEDICINE

## 2022-06-22 PROCEDURE — 250N000011 HC RX IP 250 OP 636: Performed by: EMERGENCY MEDICINE

## 2022-06-22 PROCEDURE — 36415 COLL VENOUS BLD VENIPUNCTURE: CPT | Performed by: EMERGENCY MEDICINE

## 2022-06-22 PROCEDURE — 83690 ASSAY OF LIPASE: CPT | Performed by: EMERGENCY MEDICINE

## 2022-06-22 PROCEDURE — 250N000013 HC RX MED GY IP 250 OP 250 PS 637: Performed by: EMERGENCY MEDICINE

## 2022-06-22 PROCEDURE — 258N000003 HC RX IP 258 OP 636: Performed by: EMERGENCY MEDICINE

## 2022-06-22 PROCEDURE — 85025 COMPLETE CBC W/AUTO DIFF WBC: CPT | Performed by: EMERGENCY MEDICINE

## 2022-06-22 PROCEDURE — 250N000009 HC RX 250: Performed by: EMERGENCY MEDICINE

## 2022-06-22 PROCEDURE — 82803 BLOOD GASES ANY COMBINATION: CPT | Performed by: EMERGENCY MEDICINE

## 2022-06-22 PROCEDURE — 74177 CT ABD & PELVIS W/CONTRAST: CPT

## 2022-06-22 RX ORDER — IOPAMIDOL 755 MG/ML
93 INJECTION, SOLUTION INTRAVASCULAR ONCE
Status: COMPLETED | OUTPATIENT
Start: 2022-06-22 | End: 2022-06-22

## 2022-06-22 RX ADMIN — SODIUM CHLORIDE, POTASSIUM CHLORIDE, SODIUM LACTATE AND CALCIUM CHLORIDE 1000 ML: 600; 310; 30; 20 INJECTION, SOLUTION INTRAVENOUS at 00:11

## 2022-06-22 RX ADMIN — IOPAMIDOL 93 ML: 755 INJECTION, SOLUTION INTRAVENOUS at 01:09

## 2022-06-22 RX ADMIN — SODIUM CHLORIDE 70 ML: 9 INJECTION, SOLUTION INTRAVENOUS at 01:09

## 2022-06-22 RX ADMIN — Medication 30 ML: at 00:16

## 2022-06-22 ASSESSMENT — ENCOUNTER SYMPTOMS
ABDOMINAL PAIN: 1
APPETITE CHANGE: 0
NUMBNESS: 0
SHORTNESS OF BREATH: 0
DYSURIA: 0
FEVER: 0
NAUSEA: 0
WEAKNESS: 0
COUGH: 0
VOMITING: 0
CHILLS: 0
HEADACHES: 0
LIGHT-HEADEDNESS: 0
FATIGUE: 0
CHEST TIGHTNESS: 0
BACK PAIN: 0
DYSPHORIC MOOD: 0
DIARRHEA: 1
DECREASED CONCENTRATION: 0
ACTIVITY CHANGE: 0

## 2022-06-22 NOTE — ED NOTES
Patient's staff member stated that patient's abdominal pain is getting worse and now having chest pain.  EKG order placed.

## 2022-06-22 NOTE — ED NOTES
Bilateral upper quadrant abdomen pain. Pain worse with palpation. No nausea. States loose stools.

## 2022-06-22 NOTE — ED TRIAGE NOTES
Upper abdominal pain that started a couple of hours ago.  Patient denies nausea/vomiting, pain with urination, constipation.      Triage Assessment     Row Name 06/21/22 1952       Triage Assessment (Adult)    Airway WDL WDL       Respiratory WDL    Respiratory WDL WDL       Skin Circulation/Temperature WDL    Skin Circulation/Temperature WDL WDL       Cardiac WDL    Cardiac WDL WDL       Peripheral/Neurovascular WDL    Peripheral Neurovascular WDL WDL

## 2022-06-22 NOTE — ED NOTES
Writer spoke with patient and patient denying chest pain.  Patient stated that her dexcom is not reading because it doesn't go high enough now.  Dexcom goes to 400.

## 2022-06-22 NOTE — ED PROVIDER NOTES
History     Chief Complaint   Patient presents with     Abdominal Pain     HPI  Divina Delgadillo is a 37 year old female with a history of type 2 diabetes, schizoaffective disorder, chronic kidney disease, esophageal reflux, and previous benign pancreatic tumor presenting for evaluation of epigastric and upper abdominal pain since around 4 PM.  Patient reports she had some loose stools earlier in the morning without any blood.  Initially did not have any pain but has since developed cramping and sharp pain in her upper abdomen diffusely across the upper area.  Pain not associated with nausea or vomiting.  Has had a normal diet and appetite throughout the day.  No fevers or chills.  No known sick contacts.  Pain comes in waves but has improved at times.  Pain does somewhat radiate to the back.  She reports she feels somewhat similar to the pain she had with her previous benign tumor however pain tonight was relatively abrupt.  Pain is improved currently and is not severe.  Currently rated 4-5/10.  Has not taken any medications for pain.  Denies any trauma.  Denies chest pain or difficulty breathing.    Allergies:  Allergies   Allergen Reactions     Acetaminophen Other (See Comments)     pt previously tried to overdose on it, PMD does not want pt taking per pt.      Latex Rash       Problem List:    Patient Active Problem List    Diagnosis Date Noted     Insomnia, unspecified type 08/30/2021     Priority: Medium     Type 2 diabetes mellitus (H) 01/23/2021     Priority: Medium     History of DVT of arm  (deep vein thrombosis) 01/23/2021     Priority: Medium     ultrasound 1/6/2017-  venous thrombus in the antecubital fossa region and the left forearm as described       Asthma 05/26/2020     Priority: Medium     Schizoaffective disorder, depressive type (H) 07/19/2019     Priority: Medium     Acquired asplenia 03/22/2019     Priority: Medium     Chronic leukocytosis 11/27/2018     Priority: Medium     Due to spleen  "removal       Nicotine abuse 08/13/2018     Priority: Medium     Uncontrolled type 2 diabetes mellitus with diabetic polyneuropathy, with long-term current use of insulin (H) 01/24/2018     Priority: Medium     Mild intermittent asthma with acute exacerbation 01/16/2018     Priority: Medium     Morbid obesity (H) 01/09/2018     Priority: Medium     IUD (intrauterine device) in place 07/26/2017     Priority: Medium     Mirena IUD inserted in office with premed 7/26/2017         Cervical high risk HPV (human papillomavirus) test positive 06/20/2017     Priority: Medium     6/15/2017 Pap:NIL, +HR HPV \"other\" (not 16/18). Plan to repeat Pap+HPV in 1 year  6/14/2018 Pap: NIL/neg HPV. Plan Pap+HPV in 3 years (2021)  3/26/21 NIL pap, Neg HPV. Plan cotest in 3 years.        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, splenectomy, left adrenalectomy 02/26/2015;       Type 2 diabetes mellitus with stage 3 chronic kidney disease, with long-term current use of insulin (H) 02/27/2017     Priority: Medium     Bipolar disorder (H) 02/27/2017     Priority: Medium     Orthostatic hypotension 01/10/2017     Priority: Medium     Tobacco abuse 01/10/2017     Priority: Medium     Long term (current) use of anticoagulants 01/09/2017     Priority: Medium     Stage 3 chronic kidney disease 12/03/2015     Priority: Medium     Overview:   Updated per 10/1/17 IMO import       Vitamin D insufficiency 06/01/2015     Priority: Medium     Cardiac murmur 08/20/2013     Priority: Medium     Depressive disorder 09/15/2012     Priority: Medium     Hyperlipidemia LDL goal <100 10/31/2010     Priority: Medium     Borderline personality disorder (H) 11/06/2009     Priority: Medium     Essential hypertension 06/13/2008     Priority: Medium     NAFL (nonalcoholic fatty liver) 04/11/2006     Priority: Medium     See flowsheet for hepatic panel. "   Stopped zocor, was on godon as well had right upper quandrant ultrasound and ct  ? Psych med related vs [facility]       Esophageal reflux 04/14/2005     Priority: Medium     GERD       PTSD (post-traumatic stress disorder) 01/01/2001     Priority: Medium        Past Medical History:    Past Medical History:   Diagnosis Date     Acute pain of left knee 03/22/2019     Acute-on-chronic kidney injury (H) 03/22/2019     ARF (acute renal failure) (H) 03/23/2019     Cervical high risk HPV (human papillomavirus) test positive 06/20/2017     Deep venous thrombosis of arm (H) 1/6/2017     Depressive disorder, not elsewhere classified      DVT (deep venous thrombosis) (H)      Dysmetabolic syndrome X      Esophageal reflux      Mild intermittent asthma      Near syncope 03/22/2019     Other specified types of schizophrenia, unspecified condition      Pancreatic cancer (H)      Suicidal behavior 05/26/2020     Type II or unspecified type diabetes mellitus without mention of complication, not stated as uncontrolled        Past Surgical History:    Past Surgical History:   Procedure Laterality Date     ADRENALECTOMY Left 2015     ENT SURGERY  10/01/2000    Tympanoplasty     IR LIVER BIOPSY PERCUTANEOUS  11/14/2019     PANCREATECTOMY PARTIAL  2015     PERCUTANEOUS BIOPSY LIVER Right 11/14/2019    Procedure: Percutaneous Liver Biopsy;  Surgeon: Sean Guzman PA-C;  Location: [facility] OR     SPLENECTOMY  2015     TONSILLECTOMY  10/01/2000    Tonsillectomy       Family History:    Family History   Problem Relation Age of Onset     Respiratory Mother         ASTHMA     Allergies Mother      Depression Mother      Chronic Obstructive Pulmonary Disease Mother      Heart Disease Father         HEART VALVE REPLACEMENT     Hypertension Father      Diabetes Father      Depression Father      Respiratory Sister      Allergies Sister      Depression Sister      Respiratory Sister      Allergies Sister      Depression Sister       Respiratory Brother      Allergies Brother      Kidney Disease No family hx of        Social History:  Marital Status:  Single [1]  Social History     Tobacco Use     Smoking status: Former Smoker     Packs/day: 1.00     Years: 4.00     Pack years: 4.00     Types: Cigarettes     Quit date: 2019     Years since quittin.9     Smokeless tobacco: Never Used   Vaping Use     Vaping Use: Never used   Substance Use Topics     Alcohol use: No     Drug use: No        Medications:    ACCU-CHEK GUIDE test strip  acetaminophen (TYLENOL) 325 MG tablet  albuterol (VENTOLIN HFA) 108 (90 Base) MCG/ACT inhaler  amLODIPine (NORVASC) 5 MG tablet  ARIPiprazole (ABILIFY) 30 MG tablet  atorvastatin (LIPITOR) 10 MG tablet  BIOTIN FORTE 5 MG TABS  bisacodyl (DULCOLAX) 10 MG suppository  blood glucose (ACCU-CHEK GUIDE) test strip  blood glucose monitoring (ACCU-CHEK CARLITOS PLUS) meter device kit  blood glucose monitoring (ACCU-CHEK FASTCLIX) lancets  blood glucose monitoring (NO BRAND SPECIFIED) meter device kit  calcium carbonate (TUMS) 500 MG chewable tablet  carvedilol (COREG) 6.25 MG tablet  cloZAPine (CLOZARIL) 100 MG tablet  COMPOUNDED NON-CONTROLLED SUBSTANCE (CMPD RX) - PHARMACY TO MIX COMPOUNDED MEDICATION  Continuous Blood Gluc Sensor (DEXCOM G6 SENSOR) MISC  Continuous Blood Gluc Transmit (DEXCOM G6 TRANSMITTER) MISC  docusate sodium (COLACE) 100 MG capsule  fluocinolone acetonide 0.01 % OIL  FLUoxetine (PROZAC) 40 MG capsule  glucose (BD GLUCOSE) 4 g chewable tablet  insulin aspart (NOVOLOG FLEXPEN) 100 UNIT/ML pen  insulin glargine (LANTUS PEN) 100 UNIT/ML pen  insulin pen needle (ULTICARE MINI) 31G X 6 MM miscellaneous  lamoTRIgine (LAMICTAL) 100 MG tablet  lisinopril (ZESTRIL) 5 MG tablet  loperamide (IMODIUM A-D) 2 MG tablet  loratadine (CLARITIN) 10 MG tablet  magnesium citrate solution  montelukast (SINGULAIR) 10 MG tablet  nystatin (MYCOSTATIN) 495316 UNIT/GM external ointment  NYSTATIN PO  omeprazole (PRILOSEC)  "20 MG DR capsule  polyethylene glycol (MIRALAX) 17 GM/Dose powder  pramox-pe-glycerin-petrolatum (PREPARATION H) 1-0.25-14.4-15 % CREA cream  QUEtiapine (SEROQUEL) 25 MG tablet  Semaglutide, 1 MG/DOSE, (OZEMPIC, 1 MG/DOSE,) 4 MG/3ML SOPN  Semaglutide, 1 MG/DOSE, 4 MG/3ML SOPN  traZODone (DESYREL) 50 MG tablet          Review of Systems   Constitutional: Negative for activity change, appetite change, chills, fatigue and fever.   HENT: Negative for congestion.    Respiratory: Negative for cough, chest tightness and shortness of breath.    Cardiovascular: Negative for chest pain.   Gastrointestinal: Positive for abdominal pain and diarrhea (Loose stool only, 4 episodes today). Negative for nausea and vomiting.   Genitourinary: Negative for decreased urine volume and dysuria.   Musculoskeletal: Negative for back pain.   Skin: Negative for rash.   Neurological: Negative for weakness, light-headedness, numbness and headaches.   Psychiatric/Behavioral: Negative for decreased concentration and dysphoric mood.   All other systems reviewed and are negative.      Physical Exam   BP: (!) 188/75  Pulse: 97  Temp: 98.9  F (37.2  C)  Resp: 18  Height: 162.6 cm (5' 4\")  Weight: 94.8 kg (209 lb)  SpO2: 97 %      Physical Exam  Vitals and nursing note reviewed.   Constitutional:       Appearance: She is well-developed. She is not ill-appearing or diaphoretic.   HENT:      Head: Atraumatic.      Mouth/Throat:      Mouth: Mucous membranes are moist.   Eyes:      Conjunctiva/sclera: Conjunctivae normal.   Cardiovascular:      Rate and Rhythm: Normal rate.      Heart sounds: Normal heart sounds.   Pulmonary:      Effort: Pulmonary effort is normal.      Breath sounds: Normal breath sounds.   Abdominal:      General: Abdomen is protuberant. Bowel sounds are normal.      Palpations: Abdomen is soft.      Tenderness: There is abdominal tenderness in the right upper quadrant, epigastric area and left upper quadrant.   Musculoskeletal:        "  General: Normal range of motion.      Right lower leg: No edema.      Left lower leg: No edema.   Skin:     General: Skin is warm and dry.      Capillary Refill: Capillary refill takes less than 2 seconds.   Neurological:      Mental Status: She is alert and oriented to person, place, and time.   Psychiatric:         Mood and Affect: Mood normal.         ED Course                 Procedures             Results for orders placed or performed during the hospital encounter of 06/21/22 (from the past 24 hour(s))   Glucose by meter   Result Value Ref Range    GLUCOSE BY METER POCT 407 (H) 70 - 99 mg/dL   UA with Microscopic reflex to Culture    Specimen: Urine, Midstream   Result Value Ref Range    Color Urine Yellow Colorless, Straw, Light Yellow, Yellow    Appearance Urine Clear Clear    Glucose Urine >499 (A) Negative mg/dL    Bilirubin Urine Negative Negative    Ketones Urine Negative Negative mg/dL    Specific Gravity Urine 1.015 1.003 - 1.035    Blood Urine Negative Negative    pH Urine 6.0 5.0 - 7.0    Protein Albumin Urine Negative Negative mg/dL    Urobilinogen Urine Normal Normal, 2.0 mg/dL    Nitrite Urine Negative Negative    Leukocyte Esterase Urine Negative Negative    Bacteria Urine None Seen None Seen /HPF    RBC Urine <1 <=2 /HPF    WBC Urine <1 <=5 /HPF    Squamous Epithelials Urine 1 <=1 /HPF    Hyaline Casts Urine 3 (H) <=2 /LPF    Narrative    Urine Culture not indicated   CBC with platelets differential    Narrative    The following orders were created for panel order CBC with platelets differential.  Procedure                               Abnormality         Status                     ---------                               -----------         ------                     CBC with platelets and d...[[id number]]  Abnormal            Final result                 Please view results for these tests on the individual orders.   Comprehensive metabolic panel   Result Value Ref Range    Sodium 141 133 -  144 mmol/L    Potassium 4.6 3.4 - 5.3 mmol/L    Chloride 108 94 - 109 mmol/L    Carbon Dioxide (CO2) 28 20 - 32 mmol/L    Anion Gap 5 3 - 14 mmol/L    Urea Nitrogen 25 7 - 30 mg/dL    Creatinine 1.12 (H) 0.52 - 1.04 mg/dL    Calcium 8.8 8.5 - 10.1 mg/dL    Glucose 331 (H) 70 - 99 mg/dL    Alkaline Phosphatase 189 (H) 40 - 150 U/L    AST 22 0 - 45 U/L    ALT 74 (H) 0 - 50 U/L    Protein Total 7.7 6.8 - 8.8 g/dL    Albumin 3.0 (L) 3.4 - 5.0 g/dL    Bilirubin Total 0.1 (L) 0.2 - 1.3 mg/dL    GFR Estimate 65 >60 mL/min/1.73m2   Lipase   Result Value Ref Range    Lipase 2,179 (H) 73 - 393 U/L   Blood gas venous   Result Value Ref Range    pH Venous 7.37 7.32 - 7.43    pCO2 Venous 47 40 - 50 mm Hg    pO2 Venous 51 (H) 25 - 47 mm Hg    Bicarbonate Venous 27 21 - 28 mmol/L    Base Excess/Deficit (+/-) 1.1 -7.7 - 1.9 mmol/L    FIO2 21    CBC with platelets and differential   Result Value Ref Range    WBC Count 17.3 (H) 4.0 - 11.0 10e3/uL    RBC Count 4.00 3.80 - 5.20 10e6/uL    Hemoglobin 12.3 11.7 - 15.7 g/dL    Hematocrit 37.0 35.0 - 47.0 %    MCV 93 78 - 100 fL    MCH 30.8 26.5 - 33.0 pg    MCHC 33.2 31.5 - 36.5 g/dL    RDW 14.9 10.0 - 15.0 %    Platelet Count 428 150 - 450 10e3/uL    % Neutrophils 61 %    % Lymphocytes 28 %    % Monocytes 8 %    % Eosinophils 2 %    % Basophils 1 %    Absolute Neutrophils 10.6 (H) 1.6 - 8.3 10e3/uL    Absolute Lymphocytes 4.9 0.8 - 5.3 10e3/uL    Absolute Monocytes 1.4 (H) 0.0 - 1.3 10e3/uL    Absolute Eosinophils 0.4 0.0 - 0.7 10e3/uL    Absolute Basophils 0.1 0.0 - 0.2 10e3/uL   CT Abdomen Pelvis w Contrast    Narrative    EXAM: CT ABDOMEN PELVIS W CONTRAST  LOCATION: [facility]  DATE/TIME: 6/22/2022 1:08 AM    INDICATION: Epigastric pain  COMPARISON: 8/13/2018.  TECHNIQUE: CT scan of the abdomen and pelvis was performed following injection of IV contrast. Multiplanar reformats were obtained. Dose reduction techniques were used.  CONTRAST: 93 mL Isovue  370    FINDINGS:   LOWER CHEST: Normal.    HEPATOBILIARY: Technically indeterminate 7 mm low-attenuation lesion in the right hepatic lobe superiorly on series 5, image 13. Liver elsewhere is negative. No calcified gallstones or biliary dilatation.    PANCREAS: Stable postoperative changes of distal pancreatectomy. No surrounding inflammatory changes or fluid collections. No pancreatic ductal dilatation.    SPLEEN: Surgically absent.    ADRENAL GLANDS: Left adrenal gland is absent. Normal right adrenal gland.    KIDNEYS/BLADDER: Lobulated renal contours bilaterally. No significant focal renal lesions. No hydronephrosis. Bladder is unremarkable.    BOWEL: Bowel is normal in caliber with no evidence for obstruction. Normal appendix. No definite acute bowel findings.    LYMPH NODES: Normal.    VASCULATURE: Unremarkable.    PELVIC ORGANS: IUD within the uterus.    MUSCULOSKELETAL: Normal.      Impression    IMPRESSION:   1.  Stable postoperative changes of distal pancreatectomy. Remaining portion of the pancreas appears within normal limits and there is no pancreatic ductal dilatation. No surrounding inflammatory change or fluid collections.    2.  Postoperative changes of splenectomy and left adrenalectomy.    3.  No calcified gallstones or biliary dilatation.    4.  No acute bowel findings. Normal appendix.    5.  Technically indeterminate 7 mm low-attenuation lesion in the liver. This is favored to be more likely benign but difficult to characterize due to its small size. This could be better evaluated with MRI.    6.  IUD within the uterus.     *Note: Due to a large number of results and/or encounters for the requested time period, some results have not been displayed. A complete set of results can be found in Results Review.       Medications   lactated ringers BOLUS 1,000 mL (0 mLs Intravenous Stopped 6/22/22 0150)   bismuth subsalicylate (PEPTO BISMOL) 262 mg/15 mL suspension 30 mL (30 mLs Oral Given 6/22/22 0016)    iopamidol (ISOVUE-370) solution 93 mL (93 mLs Intravenous Given 6/22/22 0109)   sodium chloride 0.9 % bag 500mL for CT scan flush use (70 mLs Intravenous Given 6/22/22 0109)       Assessments & Plan (with Medical Decision Making)  35-year-old female with history of type 2 diabetes, schizoaffective disorder, chronic kidney disease, and previous pancreatic tumor status post previous surgical resection presenting for evaluation of abdominal pain.  Patient reports upper abdominal pain similar to previous episodes of pancreatitis however she has been able to eat and drink normally with no nausea or vomiting or change in appetite.  She is well-appearing but with mild tenderness in the ED.  Screening labs obtained showed a mildly elevated lipase and elevated glucose.  Mild white count left shift of unclear origin.  No evidence of UTI.  Given the elevated lipase with a previous history of pancreatic lesion, recommended CT imaging which was obtained.  CT showed stable postoperative changes in the pancreas without other acute abnormality.  Patient was feeling much better after some IV fluids and Pepto-Bismol.  Patient decided to leave prior to imaging read by radiology.  Patient advised of return precautions as well as the need to follow-up with her family doctor to review results of her CT scan.  Patient and caretaker agreed to follow-up with her primary doctor to discuss the results of the CT scan if there are any abnormal findings that need follow-up.  Patient also states that she does have access to [employer] and will be able to view the results there.  Patient discharged in improved condition with a mild case of pancreatitis as well as hyperglycemia without evidence of DKA.     I have reviewed the nursing notes.    I have reviewed the findings, diagnosis, plan and need for follow up with the patient.       Discharge Medication List as of 6/22/2022  1:50 AM          Final diagnoses:   Acute biliary pancreatitis,  unspecified complication status       6/21/2022   [facility] EMERGENCY DEPT     Ta, Sean Blanton MD  06/22/22 2330

## 2022-06-23 ENCOUNTER — TELEPHONE (OUTPATIENT)
Dept: FAMILY MEDICINE | Facility: CLINIC | Age: 37
End: 2022-06-23

## 2022-06-23 ENCOUNTER — HOSPITAL ENCOUNTER (EMERGENCY)
Facility: CLINIC | Age: 37
Discharge: HOME | End: 2022-06-23
Attending: FAMILY MEDICINE | Admitting: FAMILY MEDICINE
Payer: COMMERCIAL

## 2022-06-23 VITALS
DIASTOLIC BLOOD PRESSURE: 57 MMHG | TEMPERATURE: 98.7 F | RESPIRATION RATE: 16 BRPM | HEIGHT: 64 IN | SYSTOLIC BLOOD PRESSURE: 121 MMHG | HEART RATE: 79 BPM | OXYGEN SATURATION: 93 % | BODY MASS INDEX: 35.17 KG/M2 | WEIGHT: 206 LBS

## 2022-06-23 DIAGNOSIS — R11.2 NON-INTRACTABLE VOMITING WITH NAUSEA, UNSPECIFIED VOMITING TYPE: ICD-10-CM

## 2022-06-23 DIAGNOSIS — R74.8 ELEVATED LIPASE: ICD-10-CM

## 2022-06-23 DIAGNOSIS — R10.13 EPIGASTRIC PAIN: ICD-10-CM

## 2022-06-23 LAB
ALBUMIN SERPL-MCNC: 2.9 G/DL (ref 3.4–5)
ALP SERPL-CCNC: 181 U/L (ref 40–150)
ALT SERPL W P-5'-P-CCNC: 74 U/L (ref 0–50)
ANION GAP SERPL CALCULATED.3IONS-SCNC: 5 MMOL/L (ref 3–14)
AST SERPL W P-5'-P-CCNC: 38 U/L (ref 0–45)
BASOPHILS # BLD AUTO: 0.2 10E3/UL (ref 0–0.2)
BASOPHILS NFR BLD AUTO: 1 %
BILIRUB SERPL-MCNC: 0.3 MG/DL (ref 0.2–1.3)
BUN SERPL-MCNC: 21 MG/DL (ref 7–30)
CALCIUM SERPL-MCNC: 8.9 MG/DL (ref 8.5–10.1)
CHLORIDE BLD-SCNC: 112 MMOL/L (ref 94–109)
CO2 SERPL-SCNC: 25 MMOL/L (ref 20–32)
CREAT SERPL-MCNC: 1.11 MG/DL (ref 0.52–1.04)
EOSINOPHIL # BLD AUTO: 0.4 10E3/UL (ref 0–0.7)
EOSINOPHIL NFR BLD AUTO: 2 %
ERYTHROCYTE [DISTWIDTH] IN BLOOD BY AUTOMATED COUNT: 15.1 % (ref 10–15)
GFR SERPL CREATININE-BSD FRML MDRD: 66 ML/MIN/1.73M2
GLUCOSE BLD-MCNC: 222 MG/DL (ref 70–99)
HCT VFR BLD AUTO: 35.3 % (ref 35–47)
HGB BLD-MCNC: 11.5 G/DL (ref 11.7–15.7)
HOLD SPECIMEN: NORMAL
IMM GRANULOCYTES # BLD: 0.1 10E3/UL
IMM GRANULOCYTES NFR BLD: 1 %
LIPASE SERPL-CCNC: 2269 U/L (ref 73–393)
LYMPHOCYTES # BLD AUTO: 5 10E3/UL (ref 0.8–5.3)
LYMPHOCYTES NFR BLD AUTO: 26 %
MCH RBC QN AUTO: 30.3 PG (ref 26.5–33)
MCHC RBC AUTO-ENTMCNC: 32.6 G/DL (ref 31.5–36.5)
MCV RBC AUTO: 93 FL (ref 78–100)
MONOCYTES # BLD AUTO: 1.6 10E3/UL (ref 0–1.3)
MONOCYTES NFR BLD AUTO: 8 %
NEUTROPHILS # BLD AUTO: 11.9 10E3/UL (ref 1.6–8.3)
NEUTROPHILS NFR BLD AUTO: 62 %
NRBC # BLD AUTO: 0 10E3/UL
NRBC BLD AUTO-RTO: 0 /100
PLAT MORPH BLD: NORMAL
PLATELET # BLD AUTO: 382 10E3/UL (ref 150–450)
POTASSIUM BLD-SCNC: 4.7 MMOL/L (ref 3.4–5.3)
PROT SERPL-MCNC: 7.2 G/DL (ref 6.8–8.8)
RBC # BLD AUTO: 3.8 10E6/UL (ref 3.8–5.2)
RBC MORPH BLD: NORMAL
SODIUM SERPL-SCNC: 142 MMOL/L (ref 133–144)
WBC # BLD AUTO: 19.2 10E3/UL (ref 4–11)

## 2022-06-23 PROCEDURE — 258N000003 HC RX IP 258 OP 636: Performed by: FAMILY MEDICINE

## 2022-06-23 PROCEDURE — 36415 COLL VENOUS BLD VENIPUNCTURE: CPT | Performed by: EMERGENCY MEDICINE

## 2022-06-23 PROCEDURE — 96374 THER/PROPH/DIAG INJ IV PUSH: CPT | Performed by: FAMILY MEDICINE

## 2022-06-23 PROCEDURE — 250N000011 HC RX IP 250 OP 636: Performed by: FAMILY MEDICINE

## 2022-06-23 PROCEDURE — 99285 EMERGENCY DEPT VISIT HI MDM: CPT | Mod: 25 | Performed by: FAMILY MEDICINE

## 2022-06-23 PROCEDURE — 99285 EMERGENCY DEPT VISIT HI MDM: CPT | Performed by: FAMILY MEDICINE

## 2022-06-23 PROCEDURE — 85004 AUTOMATED DIFF WBC COUNT: CPT | Performed by: EMERGENCY MEDICINE

## 2022-06-23 PROCEDURE — 96361 HYDRATE IV INFUSION ADD-ON: CPT | Performed by: FAMILY MEDICINE

## 2022-06-23 PROCEDURE — 83690 ASSAY OF LIPASE: CPT | Performed by: FAMILY MEDICINE

## 2022-06-23 PROCEDURE — 80053 COMPREHEN METABOLIC PANEL: CPT | Performed by: FAMILY MEDICINE

## 2022-06-23 PROCEDURE — 96375 TX/PRO/DX INJ NEW DRUG ADDON: CPT | Performed by: FAMILY MEDICINE

## 2022-06-23 PROCEDURE — 85025 COMPLETE CBC W/AUTO DIFF WBC: CPT | Performed by: FAMILY MEDICINE

## 2022-06-23 RX ORDER — HYDROCODONE BITARTRATE AND ACETAMINOPHEN 5; 325 MG/1; MG/1
1-2 TABLET ORAL EVERY 8 HOURS PRN
Qty: 10 TABLET | Refills: 0 | Status: SHIPPED | OUTPATIENT
Start: 2022-06-23 | End: 2022-07-06

## 2022-06-23 RX ORDER — HYDROMORPHONE HYDROCHLORIDE 1 MG/ML
0.5 INJECTION, SOLUTION INTRAMUSCULAR; INTRAVENOUS; SUBCUTANEOUS
Status: DISCONTINUED | OUTPATIENT
Start: 2022-06-23 | End: 2022-06-24 | Stop reason: HOSPADM

## 2022-06-23 RX ORDER — KETOROLAC TROMETHAMINE 15 MG/ML
15 INJECTION, SOLUTION INTRAMUSCULAR; INTRAVENOUS ONCE
Status: COMPLETED | OUTPATIENT
Start: 2022-06-23 | End: 2022-06-23

## 2022-06-23 RX ORDER — ONDANSETRON 4 MG/1
4-8 TABLET, ORALLY DISINTEGRATING ORAL EVERY 6 HOURS PRN
Qty: 10 TABLET | Refills: 0 | Status: SHIPPED | OUTPATIENT
Start: 2022-06-23 | End: 2023-06-01

## 2022-06-23 RX ORDER — SUCRALFATE ORAL 1 G/10ML
1-2 SUSPENSION ORAL 4 TIMES DAILY PRN
Qty: 420 ML | Refills: 0 | Status: SHIPPED | OUTPATIENT
Start: 2022-06-23 | End: 2022-06-30

## 2022-06-23 RX ORDER — ONDANSETRON 2 MG/ML
4 INJECTION INTRAMUSCULAR; INTRAVENOUS
Status: DISCONTINUED | OUTPATIENT
Start: 2022-06-23 | End: 2022-06-24 | Stop reason: HOSPADM

## 2022-06-23 RX ADMIN — SODIUM CHLORIDE 1000 ML: 9 INJECTION, SOLUTION INTRAVENOUS at 21:35

## 2022-06-23 RX ADMIN — HYDROMORPHONE HYDROCHLORIDE 0.5 MG: 1 INJECTION, SOLUTION INTRAMUSCULAR; INTRAVENOUS; SUBCUTANEOUS at 21:47

## 2022-06-23 RX ADMIN — ONDANSETRON 4 MG: 2 INJECTION INTRAMUSCULAR; INTRAVENOUS at 21:47

## 2022-06-23 RX ADMIN — KETOROLAC TROMETHAMINE 15 MG: 15 INJECTION, SOLUTION INTRAMUSCULAR; INTRAVENOUS at 21:48

## 2022-06-23 NOTE — TELEPHONE ENCOUNTER
S-(situation): See note below, call transferred to writer. Patient reports c/o abdominal pain, says that it's in her upper mid stomach into her ribs.     B-(background): Was seen in the ED on 6/21/22 for these same symptoms, see encounter and work up. Patient says pain was 10 out of 10 at that time. Says that the pain isn't that severe yet, but concerned it might get there.     A-(assessment): Patient currently rates her pain at a 6, describes as pressure and says it hurts to the touch. Says that it's worse now after she just ate. Denies nausea/vomitting or fever, denies chest pain. Does report that she feels short of breath r/t the discomfort. Patient is able to speak in full sentences while on phone with writer, no audible wheezing or signs of respiratory distress noted by writer. Says that she had x2 BMs today, diarrhea this morning but more normal this afternoon. Said that she was given Pepto when in ED, which did seem to help; said she hasn't taken anything at this time.     R-(recommendations): Patient has an appointment with PCP on 6/29, and is wondering if she should be seen sooner. Return to ED/, same day appointment, or keep appointment as scheduled? Routing to covering provider for review and recommendation.    Sapphire Bejarano RN  [facility]

## 2022-06-23 NOTE — TELEPHONE ENCOUNTER
Reason for call:  Pressure pain in upper stomach, between her ribs    Symptom or request:severe pain in stomach    Duration (how long have symptoms been present): couple of days    Have you been treated for this before? Yes  Went to ER 2 nights ago. Pancreas enlarged. Might be a tumor returning  Additional comments: getting worse now.  Was in the ER sitting for 5 hours. They were very rude to me and I don't want to go back there    Phone Number patient can be reached at:  Home number on file [phone] (home)    Best Time:  any  Routed to a nurse  Can we leave a detailed message on this number:  YES    Call taken on 6/23/2022 at 5:21 PM by Vandana Alexandre

## 2022-06-23 NOTE — TELEPHONE ENCOUNTER
Writer immediately huddled with covering provider/Dr. Alvarez. Per Dr. Alvarez's recommendation, patient is to be scheduled with any provider in clinic for tomorrow 6/24/22 to follow up; return to ED tonight if any new or worsening of symptoms (worsening of abdominal pain, vomiting, fever, etc). Writer called patient to notify; she expresses concern about finding transportation to an appointment tomorrow, and says that she feels like she may need to go to ED tonight. Patient requests that writer call and speak with Oliva Martinez,  at her living facility, at [phone]; patient gives writer verbal consent to discuss PHI with her. Writer contacted Oliva and updated her on current symptoms and Dr. Alvarez's recommendation; she verbalized understanding, requests that an appointment be made for first thing in the morning and she will provide transportation. Appointment scheduled as requested. Writer reinforced that patient would need to go to ED if any new or worsening of symptoms, Oliva verbalized understanding. Oliva requests that writer return call to patient to update her on this conversation and notify of upcoming appointment, and to advise patient to call Oliva if feeling need to go to ED tonight. Writer notified the patient, and she verbalized understanding.    Sapphire Bejarano RN  [facility]

## 2022-06-24 NOTE — DISCHARGE INSTRUCTIONS
RETURN TO THE EMERGENCY ROOM FOR THE FOLLOWING:    Severely worsened pain, fever greater than 101, dehydration, or at anytime for any concern.    FOLLOW UP:    Strongly consider calling your gastroenterologist and scheduling an appointment.  Tell them that you were in the ED for epigastric pain and that your lipase was about 2200, CT scan unremarkable.  Establishing an appointment now is probably good as it will take some time to get him.    TREATMENT RECOMMENDATIONS:    Start with liquids and then advance her diet as able over the next 48 hours.    Zofran as needed for nausea.    Hydrocodone/acetaminophen for pain.  Carafate for pain.  Omeprazole OTC 20 mg twice a day for 2 weeks.    NURSE ADVICE LINE:  [phone] or [phone]

## 2022-06-24 NOTE — ED PROVIDER NOTES
HPI   The patient is a 37-year-old female presenting with epigastric pain and nausea.  She was seen here 2 days ago and diagnosed with probable pancreatitis.  Her lipase was high.  She has a known history of distal pancreatectomy because of mass.  She did not have evidence for inflammation on the CT scan.  No evidence for ductal dilatation or abscess.  The patient was frustrated with her time in the ED and ultimately left without knowing the end result of her work-up or having a conversation about what to do outside the hospital.  She did not get prescription medication.  She comes in today because her epigastric pain has not gone away.  She rates it 8/10.  In route she received Dilaudid.  She has been nauseous throughout but she denies vomiting.  She had a bowl of cereal today and some fluid throughout the day but nothing else.  No fever.  She did have 3 small episodes of black stool today but she reports using Pepto-Bismol 2 days ago.  No lightheadedness or fainting.  No chest pain.  No shortness of breath.        Allergies:  Allergies   Allergen Reactions     Acetaminophen Other (See Comments)     pt previously tried to overdose on it, PMD does not want pt taking per pt.      Latex Rash     Problem List:    Patient Active Problem List    Diagnosis Date Noted     Insomnia, unspecified type 08/30/2021     Priority: Medium     Type 2 diabetes mellitus (H) 01/23/2021     Priority: Medium     History of DVT of arm  (deep vein thrombosis) 01/23/2021     Priority: Medium     ultrasound 1/6/2017-  venous thrombus in the antecubital fossa region and the left forearm as described       Asthma 05/26/2020     Priority: Medium     Schizoaffective disorder, depressive type (H) 07/19/2019     Priority: Medium     Acquired asplenia 03/22/2019     Priority: Medium     Chronic leukocytosis 11/27/2018     Priority: Medium     Due to spleen removal       Nicotine abuse 08/13/2018     Priority: Medium     Uncontrolled type 2  "diabetes mellitus with diabetic polyneuropathy, with long-term current use of insulin (H) 01/24/2018     Priority: Medium     Mild intermittent asthma with acute exacerbation 01/16/2018     Priority: Medium     Morbid obesity (H) 01/09/2018     Priority: Medium     IUD (intrauterine device) in place 07/26/2017     Priority: Medium     Mirena IUD inserted in office with premed 7/26/2017         Cervical high risk HPV (human papillomavirus) test positive 06/20/2017     Priority: Medium     6/15/2017 Pap:NIL, +HR HPV \"other\" (not 16/18). Plan to repeat Pap+HPV in 1 year  6/14/2018 Pap: NIL/neg HPV. Plan Pap+HPV in 3 years (2021)  3/26/21 NIL pap, Neg HPV. Plan cotest in 3 years.        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, splenectomy, left adrenalectomy 02/26/2015;       Type 2 diabetes mellitus with stage 3 chronic kidney disease, with long-term current use of insulin (H) 02/27/2017     Priority: Medium     Bipolar disorder (H) 02/27/2017     Priority: Medium     Orthostatic hypotension 01/10/2017     Priority: Medium     Tobacco abuse 01/10/2017     Priority: Medium     Long term (current) use of anticoagulants 01/09/2017     Priority: Medium     Stage 3 chronic kidney disease 12/03/2015     Priority: Medium     Overview:   Updated per 10/1/17 IMO import       Vitamin D insufficiency 06/01/2015     Priority: Medium     Cardiac murmur 08/20/2013     Priority: Medium     Depressive disorder 09/15/2012     Priority: Medium     Hyperlipidemia LDL goal <100 10/31/2010     Priority: Medium     Borderline personality disorder (H) 11/06/2009     Priority: Medium     Essential hypertension 06/13/2008     Priority: Medium     NAFL (nonalcoholic fatty liver) 04/11/2006     Priority: Medium     See flowsheet for hepatic panel.   Stopped zocor, was on godon as well had right upper quandrant ultrasound and ct  ? Psych " med related vs [facility]NI       Esophageal reflux 04/14/2005     Priority: Medium     GERD       PTSD (post-traumatic stress disorder) 01/01/2001     Priority: Medium      Past Medical History:    Past Medical History:   Diagnosis Date     Acute pain of left knee 03/22/2019     Acute-on-chronic kidney injury (H) 03/22/2019     ARF (acute renal failure) (H) 03/23/2019     Cervical high risk HPV (human papillomavirus) test positive 06/20/2017     Deep venous thrombosis of arm (H) 1/6/2017     Depressive disorder, not elsewhere classified      DVT (deep venous thrombosis) (H)      Dysmetabolic syndrome X      Esophageal reflux      Mild intermittent asthma      Near syncope 03/22/2019     Other specified types of schizophrenia, unspecified condition      Pancreatic cancer (H)      Suicidal behavior 05/26/2020     Type II or unspecified type diabetes mellitus without mention of complication, not stated as uncontrolled      Past Surgical History:    Past Surgical History:   Procedure Laterality Date     ADRENALECTOMY Left 2015     ENT SURGERY  10/01/2000    Tympanoplasty     IR LIVER BIOPSY PERCUTANEOUS  11/14/2019     PANCREATECTOMY PARTIAL  2015     PERCUTANEOUS BIOPSY LIVER Right 11/14/2019    Procedure: Percutaneous Liver Biopsy;  Surgeon: Sean Guzman PA-C;  Location: [facility] OR     SPLENECTOMY  2015     TONSILLECTOMY  10/01/2000    Tonsillectomy     Family History:    Family History   Problem Relation Age of Onset     Respiratory Mother         ASTHMA     Allergies Mother      Depression Mother      Chronic Obstructive Pulmonary Disease Mother      Heart Disease Father         HEART VALVE REPLACEMENT     Hypertension Father      Diabetes Father      Depression Father      Respiratory Sister      Allergies Sister      Depression Sister      Respiratory Sister      Allergies Sister      Depression Sister      Respiratory Brother      Allergies Brother      Kidney Disease No family hx of      Social  History:  Marital Status:  Single [1]  Social History     Tobacco Use     Smoking status: Former Smoker     Packs/day: 1.00     Years: 4.00     Pack years: 4.00     Types: Cigarettes     Quit date: 2019     Years since quittin.9     Smokeless tobacco: Never Used   Vaping Use     Vaping Use: Never used   Substance Use Topics     Alcohol use: No     Drug use: No      Medications:    HYDROcodone-acetaminophen (NORCO) 5-325 MG tablet  ondansetron (ZOFRAN ODT) 4 MG ODT tab  sucralfate (CARAFATE) 1 GM/10ML suspension  ACCU-CHEK GUIDE test strip  acetaminophen (TYLENOL) 325 MG tablet  albuterol (VENTOLIN HFA) 108 (90 Base) MCG/ACT inhaler  amLODIPine (NORVASC) 5 MG tablet  ARIPiprazole (ABILIFY) 30 MG tablet  atorvastatin (LIPITOR) 10 MG tablet  BIOTIN FORTE 5 MG TABS  bisacodyl (DULCOLAX) 10 MG suppository  blood glucose (ACCU-CHEK GUIDE) test strip  blood glucose monitoring (ACCU-CHEK CARLITOS PLUS) meter device kit  blood glucose monitoring (ACCU-CHEK FASTCLIX) lancets  blood glucose monitoring (NO BRAND SPECIFIED) meter device kit  calcium carbonate (TUMS) 500 MG chewable tablet  carvedilol (COREG) 6.25 MG tablet  cloZAPine (CLOZARIL) 100 MG tablet  COMPOUNDED NON-CONTROLLED SUBSTANCE (CMPD RX) - PHARMACY TO MIX COMPOUNDED MEDICATION  Continuous Blood Gluc Sensor (DEXCOM G6 SENSOR) MISC  Continuous Blood Gluc Transmit (DEXCOM G6 TRANSMITTER) MISC  docusate sodium (COLACE) 100 MG capsule  fluocinolone acetonide 0.01 % OIL  FLUoxetine (PROZAC) 40 MG capsule  glucose (BD GLUCOSE) 4 g chewable tablet  insulin aspart (NOVOLOG FLEXPEN) 100 UNIT/ML pen  insulin glargine (LANTUS PEN) 100 UNIT/ML pen  insulin pen needle (ULTICARE MINI) 31G X 6 MM miscellaneous  lamoTRIgine (LAMICTAL) 100 MG tablet  lisinopril (ZESTRIL) 5 MG tablet  loperamide (IMODIUM A-D) 2 MG tablet  loratadine (CLARITIN) 10 MG tablet  magnesium citrate solution  montelukast (SINGULAIR) 10 MG tablet  nystatin (MYCOSTATIN) 914251 UNIT/GM external  "ointment  NYSTATIN PO  omeprazole (PRILOSEC) 20 MG DR capsule  polyethylene glycol (MIRALAX) 17 GM/Dose powder  pramox-pe-glycerin-petrolatum (PREPARATION H) 1-0.25-14.4-15 % CREA cream  QUEtiapine (SEROQUEL) 25 MG tablet  Semaglutide, 1 MG/DOSE, (OZEMPIC, 1 MG/DOSE,) 4 MG/3ML SOPN  Semaglutide, 1 MG/DOSE, 4 MG/3ML SOPN  traZODone (DESYREL) 50 MG tablet      Review of Systems   All other systems reviewed and are negative.      PE   BP: (!) 156/65  Pulse: 91  Temp: 98.7  F (37.1  C)  Resp: 16  Height: 162.6 cm (5' 4\")  Weight: 93.4 kg (206 lb)  SpO2: 94 %  Physical Exam  Vitals reviewed.   Constitutional:       General: She is not in acute distress.     Appearance: She is well-developed. She is obese.   HENT:      Head: Normocephalic and atraumatic.      Right Ear: External ear normal.      Left Ear: External ear normal.      Nose: Nose normal.      Mouth/Throat:      Mouth: Mucous membranes are moist.      Pharynx: Oropharynx is clear.   Eyes:      Extraocular Movements: Extraocular movements intact.      Conjunctiva/sclera: Conjunctivae normal.      Pupils: Pupils are equal, round, and reactive to light.   Cardiovascular:      Rate and Rhythm: Normal rate and regular rhythm.   Pulmonary:      Effort: Pulmonary effort is normal.   Abdominal:      Comments: Epigastric tenderness.   Musculoskeletal:         General: Normal range of motion.      Cervical back: Normal range of motion.   Skin:     General: Skin is warm and dry.   Neurological:      Mental Status: She is alert and oriented to person, place, and time.   Psychiatric:         Behavior: Behavior normal.         ED COURSE and MDM   2133.  The patient has epigastric pain and an elevated lipase 2 days ago.  CT scan unremarkable.  Pain is persistent.  Nausea is persistent.  She likely has pancreatitis but I am not sure why.  She has a known history of partial pancreatectomy because of mass.  She denies alcohol.  Dilaudid ordered.  Toradol ordered.  Zofran " ordered.  Fluid bolus.    2218.  Lipase only very mildly elevated compared to the recent.  White blood cell count also elevated.  Her story would be consistent with pancreatitis but I am not sure why this is happening if that is the case and I think it significant that her CT scan was unremarkable and her lipase only minimally elevated.  Gastritis or ulcer?  I will provide oral medication for pain: Norco, omeprazole, Carafate.  We will provide Zofran.  The patient would like to be discharged home.  I agree with the plan.  She has follow-up next week.  Return as needed for fever, severe pain, dehydration.    LABS  Labs Ordered and Resulted from Time of ED Arrival to Time of ED Departure   LIPASE - Abnormal       Result Value    Lipase 2,269 (*)    CBC WITH PLATELETS AND DIFFERENTIAL - Abnormal    WBC Count 19.2 (*)     RBC Count 3.80      Hemoglobin 11.5 (*)     Hematocrit 35.3      MCV 93      MCH 30.3      MCHC 32.6      RDW 15.1 (*)     Platelet Count 382      % Neutrophils 62      % Lymphocytes 26      % Monocytes 8      % Eosinophils 2      % Basophils 1      % Immature Granulocytes 1      NRBCs per 100 WBC 0      Absolute Neutrophils 11.9 (*)     Absolute Lymphocytes 5.0      Absolute Monocytes 1.6 (*)     Absolute Eosinophils 0.4      Absolute Basophils 0.2      Absolute Immature Granulocytes 0.1      Absolute NRBCs 0.0     COMPREHENSIVE METABOLIC PANEL - Abnormal    Sodium 142      Potassium 4.7      Chloride 112 (*)     Carbon Dioxide (CO2) 25      Anion Gap 5      Urea Nitrogen 21      Creatinine 1.11 (*)     Calcium 8.9      Glucose 222 (*)     Alkaline Phosphatase 181 (*)     AST 38      ALT 74 (*)     Protein Total 7.2      Albumin 2.9 (*)     Bilirubin Total 0.3      GFR Estimate 66     RBC AND PLATELET MORPHOLOGY    Platelet Assessment        Value: Automated Count Confirmed. Platelet morphology is normal.    RBC Morphology Confirmed RBC Indices         IMAGING  Images reviewed by me.  Radiology report  also reviewed.  No orders to display       Procedures    Medications   0.9% sodium chloride BOLUS (has no administration in time range)   HYDROmorphone (PF) (DILAUDID) injection 0.5 mg (0.5 mg Intravenous Given 6/23/22 2147)   ondansetron (ZOFRAN) injection 4 mg (4 mg Intravenous Given 6/23/22 2147)   ketorolac (TORADOL) injection 15 mg (15 mg Intravenous Given 6/23/22 2148)         IMPRESSION       ICD-10-CM    1. Epigastric pain  R10.13    2. Elevated lipase  R74.8    3. Non-intractable vomiting with nausea, unspecified vomiting type  R11.2             Medication List      Started    HYDROcodone-acetaminophen 5-325 MG tablet  Commonly known as: NORCO  1-2 tablets, Oral, EVERY 8 HOURS PRN     ondansetron 4 MG ODT tab  Commonly known as: ZOFRAN ODT  4-8 mg, Oral, EVERY 6 HOURS PRN     sucralfate 1 GM/10ML suspension  Commonly known as: Carafate  1-2 g, Oral, 4 TIMES DAILY PRN                        Wong Santana MD  06/23/22 1470

## 2022-06-24 NOTE — ED TRIAGE NOTES
Dx on Tuesday with pancreatitis. States she has f/u scheduled tomorrow but could not wait due to pain. Reports pain starting in RUQ then radiates to back.     Dilaudid 0.5mg given en route.     Triage Assessment     Row Name 06/23/22 1941       Triage Assessment (Adult)    Airway WDL WDL       Respiratory WDL    Respiratory WDL WDL       Skin Circulation/Temperature WDL    Skin Circulation/Temperature WDL WDL       Cardiac WDL    Cardiac WDL WDL       Peripheral/Neurovascular WDL    Peripheral Neurovascular WDL WDL       Cognitive/Neuro/Behavioral WDL    Cognitive/Neuro/Behavioral WDL WDL

## 2022-07-06 ENCOUNTER — OFFICE VISIT (OUTPATIENT)
Dept: FAMILY MEDICINE | Facility: CLINIC | Age: 37
End: 2022-07-06
Payer: COMMERCIAL

## 2022-07-06 VITALS
BODY MASS INDEX: 35.58 KG/M2 | DIASTOLIC BLOOD PRESSURE: 62 MMHG | RESPIRATION RATE: 20 BRPM | WEIGHT: 208.4 LBS | HEART RATE: 90 BPM | OXYGEN SATURATION: 97 % | SYSTOLIC BLOOD PRESSURE: 138 MMHG | HEIGHT: 64 IN | TEMPERATURE: 98.3 F

## 2022-07-06 DIAGNOSIS — Z72.0 TOBACCO ABUSE: ICD-10-CM

## 2022-07-06 DIAGNOSIS — I10 ESSENTIAL HYPERTENSION: Chronic | ICD-10-CM

## 2022-07-06 DIAGNOSIS — Z23 NEED FOR VACCINATION: ICD-10-CM

## 2022-07-06 DIAGNOSIS — Z87.19 HISTORY OF PANCREATITIS: ICD-10-CM

## 2022-07-06 DIAGNOSIS — E78.5 HYPERLIPIDEMIA LDL GOAL <100: ICD-10-CM

## 2022-07-06 DIAGNOSIS — R20.0 BILATERAL HAND NUMBNESS: ICD-10-CM

## 2022-07-06 LAB
ALBUMIN SERPL-MCNC: 3.1 G/DL (ref 3.4–5)
ALP SERPL-CCNC: 160 U/L (ref 40–150)
ALT SERPL W P-5'-P-CCNC: 39 U/L (ref 0–50)
ANION GAP SERPL CALCULATED.3IONS-SCNC: 3 MMOL/L (ref 3–14)
AST SERPL W P-5'-P-CCNC: 28 U/L (ref 0–45)
BASOPHILS # BLD AUTO: 0.1 10E3/UL (ref 0–0.2)
BASOPHILS NFR BLD AUTO: 1 %
BILIRUB SERPL-MCNC: 0.2 MG/DL (ref 0.2–1.3)
BUN SERPL-MCNC: 21 MG/DL (ref 7–30)
CALCIUM SERPL-MCNC: 8.2 MG/DL (ref 8.5–10.1)
CHLORIDE BLD-SCNC: 112 MMOL/L (ref 94–109)
CO2 SERPL-SCNC: 25 MMOL/L (ref 20–32)
CREAT SERPL-MCNC: 1.06 MG/DL (ref 0.52–1.04)
EOSINOPHIL # BLD AUTO: 0.3 10E3/UL (ref 0–0.7)
EOSINOPHIL NFR BLD AUTO: 2 %
ERYTHROCYTE [DISTWIDTH] IN BLOOD BY AUTOMATED COUNT: 14.3 % (ref 10–15)
GFR SERPL CREATININE-BSD FRML MDRD: 69 ML/MIN/1.73M2
GLUCOSE BLD-MCNC: 102 MG/DL (ref 70–99)
HCT VFR BLD AUTO: 38.4 % (ref 35–47)
HGB BLD-MCNC: 12.1 G/DL (ref 11.7–15.7)
LIPASE SERPL-CCNC: 572 U/L (ref 73–393)
LYMPHOCYTES # BLD AUTO: 5.6 10E3/UL (ref 0.8–5.3)
LYMPHOCYTES NFR BLD AUTO: 33 %
MCH RBC QN AUTO: 30 PG (ref 26.5–33)
MCHC RBC AUTO-ENTMCNC: 31.5 G/DL (ref 31.5–36.5)
MCV RBC AUTO: 95 FL (ref 78–100)
MONOCYTES # BLD AUTO: 1.5 10E3/UL (ref 0–1.3)
MONOCYTES NFR BLD AUTO: 9 %
NEUTROPHILS # BLD AUTO: 9.6 10E3/UL (ref 1.6–8.3)
NEUTROPHILS NFR BLD AUTO: 57 %
PLATELET # BLD AUTO: 384 10E3/UL (ref 150–450)
POTASSIUM BLD-SCNC: 4.4 MMOL/L (ref 3.4–5.3)
PROT SERPL-MCNC: 7.7 G/DL (ref 6.8–8.8)
RBC # BLD AUTO: 4.04 10E6/UL (ref 3.8–5.2)
SODIUM SERPL-SCNC: 140 MMOL/L (ref 133–144)
WBC # BLD AUTO: 16.9 10E3/UL (ref 4–11)

## 2022-07-06 PROCEDURE — 90734 MENACWYD/MENACWYCRM VACC IM: CPT | Performed by: NURSE PRACTITIONER

## 2022-07-06 PROCEDURE — 99214 OFFICE O/P EST MOD 30 MIN: CPT | Mod: 25 | Performed by: NURSE PRACTITIONER

## 2022-07-06 PROCEDURE — 90471 IMMUNIZATION ADMIN: CPT | Performed by: NURSE PRACTITIONER

## 2022-07-06 PROCEDURE — 83690 ASSAY OF LIPASE: CPT | Performed by: NURSE PRACTITIONER

## 2022-07-06 PROCEDURE — 80053 COMPREHEN METABOLIC PANEL: CPT | Performed by: NURSE PRACTITIONER

## 2022-07-06 PROCEDURE — 36415 COLL VENOUS BLD VENIPUNCTURE: CPT | Performed by: NURSE PRACTITIONER

## 2022-07-06 PROCEDURE — 85025 COMPLETE CBC W/AUTO DIFF WBC: CPT | Performed by: NURSE PRACTITIONER

## 2022-07-06 RX ORDER — LISINOPRIL 5 MG/1
5 TABLET ORAL DAILY
Qty: 90 TABLET | Refills: 3 | Status: SHIPPED | OUTPATIENT
Start: 2022-07-06 | End: 2023-05-03

## 2022-07-06 RX ORDER — AMLODIPINE BESYLATE 5 MG/1
5 TABLET ORAL
Qty: 90 TABLET | Refills: 1 | Status: SHIPPED | OUTPATIENT
Start: 2022-07-06 | End: 2022-11-15

## 2022-07-06 RX ORDER — CARVEDILOL 6.25 MG/1
6.25 TABLET ORAL 2 TIMES DAILY
Qty: 60 TABLET | Refills: 3 | Status: SHIPPED | OUTPATIENT
Start: 2022-07-06 | End: 2022-10-17

## 2022-07-06 RX ORDER — ATORVASTATIN CALCIUM 10 MG/1
10 TABLET, FILM COATED ORAL DAILY
Qty: 90 TABLET | Refills: 3 | Status: SHIPPED | OUTPATIENT
Start: 2022-07-06 | End: 2023-06-29

## 2022-07-06 ASSESSMENT — PATIENT HEALTH QUESTIONNAIRE - PHQ9
SUM OF ALL RESPONSES TO PHQ QUESTIONS 1-9: 4
10. IF YOU CHECKED OFF ANY PROBLEMS, HOW DIFFICULT HAVE THESE PROBLEMS MADE IT FOR YOU TO DO YOUR WORK, TAKE CARE OF THINGS AT HOME, OR GET ALONG WITH OTHER PEOPLE: SOMEWHAT DIFFICULT
SUM OF ALL RESPONSES TO PHQ QUESTIONS 1-9: 4

## 2022-07-06 ASSESSMENT — PAIN SCALES - GENERAL: PAINLEVEL: MODERATE PAIN (5)

## 2022-07-06 NOTE — PROGRESS NOTES
Assessment & Plan     Uncontrolled type 2 diabetes mellitus with diabetic polyneuropathy, with long-term current use of insulin (H)  -recommended to increase Lantus to 29 units daily morning   -follow up with diabetic educator  -continue Ozempic  -continue Novolog 1-4 units 3 times daily with meals   - Adult Endocrinology  Referral; Future  - [facility]B Adult Diabetes Educator Referral; Future  - insulin glargine (LANTUS PEN) 100 UNIT/ML pen; Inject 29 Units Subcutaneous every morning    Tobacco abuse    - nicotine (NICODERM CQ) 7 MG/24HR 24 hr patch; Place 1 patch onto the skin every 24 hours    Bilateral hand numbness  -possible carpal tunnel, patient tried braces at night time without improvement, recommended EMG study for additional evaluation, will consider orthopedic consult for carpal tunnel follow up   - EMG; Future    History of pancreatitis  -labs improving, Lipase level trending down, patient is feeling better, denies nausea and abdominal pain   - CBC with platelets and differential; Future  - Lipase; Future  - Comprehensive metabolic panel (BMP + Alb, Alk Phos, ALT, AST, Total. Bili, TP); Future  - CBC with platelets and differential  - Lipase  - Comprehensive metabolic panel (BMP + Alb, Alk Phos, ALT, AST, Total. Bili, TP)    Essential hypertension  -BP borderline today, patient stopped Coreg because she run out   -continue Lisinopril 5 mg daily   -continue Norvasc 5 mg daily   - carvedilol (COREG) 6.25 MG tablet; Take 1 tablet (6.25 mg) by mouth 2 times daily    Need for vaccination    - MENINGOCOCCAL VACCINE,IM (MENACTRA) [[id number]] AGE 11-55    VERONIQUE Spears [facility]    Rhiannon Murcia is a 37 year old accompanied by her in house services worker- [employer], presenting for the following health issues:  RECHECK, NEUROPATHY (Tingling in hands), and Recheck Medication (Patient would like to see if she can get an order for an insulin pump)      [facility]  "Complaint   Patient presents with     RECHECK     NEUROPATHY     Tingling in hands     Recheck Medication     Patient would like to see if she can get an order for an insulin pump         Tingling in hands       Duration: x2 months    Description (location/character/radiation): forearm down to fingers have the feeling of falling asleep    Intensity:  moderate    Accompanying signs and symptoms: none    History (similar episodes/previous evaluation): None    Precipitating or alleviating factors: None    Therapies tried and outcome: shaking the hands will sometimes make it stop tingling        Hospital Follow-up Visit:    Hospital/Nursing Home/IP Rehab Facility: [facility]  Date of Admission: 6/23/22  Date of Discharge: 6/23/22  Reason(s) for Admission: Epigastric pain     Was your hospitalization related to COVID-19? No   Problems taking medications regularly:  None  Medication changes since discharge: None  Problems adhering to non-medication therapy:  None    Summary of hospitalization:  [facility] discharge summary reviewed  Diagnostic Tests/Treatments reviewed.  Follow up needed: none  Other Healthcare Providers Involved in Patient s Care:         None  Update since discharge: improved   Post Discharge Medication Reconciliation: discharge medications reconciled, continue medications without change.  Plan of care communicated with patient         Review of Systems   Constitutional, HEENT, cardiovascular, pulmonary, gi and gu systems are negative, except as otherwise noted.      Objective    /62 (BP Location: Left arm, Patient Position: Sitting, Cuff Size: Adult Regular)   Pulse 90   Temp 98.3  F (36.8  C) (Tympanic)   Resp 20   Ht 1.626 m (5' 4\")   Wt 94.5 kg (208 lb 6.4 oz)   SpO2 97%   Breastfeeding No   BMI 35.77 kg/m    Body mass index is 35.77 kg/m .  Physical Exam   GENERAL: healthy, alert and no distress  EYES: Eyes grossly normal to inspection, " PERRL and conjunctivae and sclerae normal  NECK: no adenopathy, no asymmetry, masses, or scars and thyroid normal to palpation  RESP: lungs clear to auscultation - no rales, rhonchi or wheezes  CV: regular rate and rhythm, normal S1 S2, no S3 or S4, no murmur, click or rub, no peripheral edema and peripheral pulses strong  MS: no gross musculoskeletal defects noted, no edema  PSYCH: mentation appears normal, affect normal/bright    Results for orders placed or performed in visit on 07/06/22 (from the past 24 hour(s))   CBC with platelets and differential    Narrative    The following orders were created for panel order CBC with platelets and differential.  Procedure                               Abnormality         Status                     ---------                               -----------         ------                     CBC with platelets and d...[[id number]]  Abnormal            Final result                 Please view results for these tests on the individual orders.   Lipase   Result Value Ref Range    Lipase 572 (H) 73 - 393 U/L   Comprehensive metabolic panel (BMP + Alb, Alk Phos, ALT, AST, Total. Bili, TP)   Result Value Ref Range    Sodium 140 133 - 144 mmol/L    Potassium 4.4 3.4 - 5.3 mmol/L    Chloride 112 (H) 94 - 109 mmol/L    Carbon Dioxide (CO2) 25 20 - 32 mmol/L    Anion Gap 3 3 - 14 mmol/L    Urea Nitrogen 21 7 - 30 mg/dL    Creatinine 1.06 (H) 0.52 - 1.04 mg/dL    Calcium 8.2 (L) 8.5 - 10.1 mg/dL    Glucose 102 (H) 70 - 99 mg/dL    Alkaline Phosphatase 160 (H) 40 - 150 U/L    AST 28 0 - 45 U/L    ALT 39 0 - 50 U/L    Protein Total 7.7 6.8 - 8.8 g/dL    Albumin 3.1 (L) 3.4 - 5.0 g/dL    Bilirubin Total 0.2 0.2 - 1.3 mg/dL    GFR Estimate 69 >60 mL/min/1.73m2   CBC with platelets and differential   Result Value Ref Range    WBC Count 16.9 (H) 4.0 - 11.0 10e3/uL    RBC Count 4.04 3.80 - 5.20 10e6/uL    Hemoglobin 12.1 11.7 - 15.7 g/dL    Hematocrit 38.4 35.0 - 47.0 %    MCV 95 78 - 100 fL    MCH  30.0 26.5 - 33.0 pg    MCHC 31.5 31.5 - 36.5 g/dL    RDW 14.3 10.0 - 15.0 %    Platelet Count 384 150 - 450 10e3/uL    % Neutrophils 57 %    % Lymphocytes 33 %    % Monocytes 9 %    % Eosinophils 2 %    % Basophils 1 %    Absolute Neutrophils 9.6 (H) 1.6 - 8.3 10e3/uL    Absolute Lymphocytes 5.6 (H) 0.8 - 5.3 10e3/uL    Absolute Monocytes 1.5 (H) 0.0 - 1.3 10e3/uL    Absolute Eosinophils 0.3 0.0 - 0.7 10e3/uL    Absolute Basophils 0.1 0.0 - 0.2 10e3/uL     *Note: Due to a large number of results and/or encounters for the requested time period, some results have not been displayed. A complete set of results can be found in Results Review.               .  ..  Answers for HPI/ROS submitted by the patient on 7/6/2022  If you checked off any problems, how difficult have these problems made it for you to do your work, take care of things at home, or get along with other people?: Somewhat difficult  PHQ9 TOTAL SCORE: 4      Answers for HPI/ROS submitted by the patient on 7/6/2022  If you checked off any problems, how difficult have these problems made it for you to do your work, take care of things at home, or get along with other people?: Somewhat difficult  PHQ9 TOTAL SCORE: 4

## 2022-07-07 ENCOUNTER — TELEPHONE (OUTPATIENT)
Dept: FAMILY MEDICINE | Facility: CLINIC | Age: 37
End: 2022-07-07

## 2022-07-07 NOTE — TELEPHONE ENCOUNTER
Diabetes Education Scheduling Outreach #1:    Call to patient to schedule. Voicemail not setup    Also sent [email or web address] message for second attempt. Requested patient to call to schedule.    Jessica Olvera On[employer]  Diabetes and Nutrition Scheduling

## 2022-07-11 ENCOUNTER — LAB (OUTPATIENT)
Dept: LAB | Facility: CLINIC | Age: 37
End: 2022-07-11
Payer: COMMERCIAL

## 2022-07-11 DIAGNOSIS — K83.1 CHOLESTATIC LIVER DISEASE (H): ICD-10-CM

## 2022-07-11 DIAGNOSIS — F20.9 SCHIZOPHRENIA, UNSPECIFIED TYPE (H): ICD-10-CM

## 2022-07-11 LAB
ALBUMIN SERPL-MCNC: 3.1 G/DL (ref 3.4–5)
ALP SERPL-CCNC: 176 U/L (ref 40–150)
ALT SERPL W P-5'-P-CCNC: 61 U/L (ref 0–50)
AST SERPL W P-5'-P-CCNC: 36 U/L (ref 0–45)
BASOPHILS # BLD AUTO: 0.2 10E3/UL (ref 0–0.2)
BASOPHILS NFR BLD AUTO: 1 %
BILIRUB DIRECT SERPL-MCNC: <0.1 MG/DL (ref 0–0.2)
BILIRUB SERPL-MCNC: 0.2 MG/DL (ref 0.2–1.3)
EOSINOPHIL # BLD AUTO: 0.3 10E3/UL (ref 0–0.7)
EOSINOPHIL NFR BLD AUTO: 2 %
ERYTHROCYTE [DISTWIDTH] IN BLOOD BY AUTOMATED COUNT: 14.5 % (ref 10–15)
HCT VFR BLD AUTO: 38.7 % (ref 35–47)
HGB BLD-MCNC: 12.4 G/DL (ref 11.7–15.7)
IMM GRANULOCYTES # BLD: 0.1 10E3/UL
IMM GRANULOCYTES NFR BLD: 1 %
LYMPHOCYTES # BLD AUTO: 4.4 10E3/UL (ref 0.8–5.3)
LYMPHOCYTES NFR BLD AUTO: 30 %
MCH RBC QN AUTO: 30 PG (ref 26.5–33)
MCHC RBC AUTO-ENTMCNC: 32 G/DL (ref 31.5–36.5)
MCV RBC AUTO: 94 FL (ref 78–100)
MONOCYTES # BLD AUTO: 1.2 10E3/UL (ref 0–1.3)
MONOCYTES NFR BLD AUTO: 9 %
NEUTROPHILS # BLD AUTO: 8.3 10E3/UL (ref 1.6–8.3)
NEUTROPHILS NFR BLD AUTO: 57 %
NRBC # BLD AUTO: 0 10E3/UL
NRBC BLD AUTO-RTO: 0 /100
PLATELET # BLD AUTO: 396 10E3/UL (ref 150–450)
PROT SERPL-MCNC: 7.5 G/DL (ref 6.8–8.8)
RBC # BLD AUTO: 4.14 10E6/UL (ref 3.8–5.2)
WBC # BLD AUTO: 14.5 10E3/UL (ref 4–11)

## 2022-07-11 PROCEDURE — 36415 COLL VENOUS BLD VENIPUNCTURE: CPT

## 2022-07-11 PROCEDURE — 80076 HEPATIC FUNCTION PANEL: CPT

## 2022-07-11 PROCEDURE — 85025 COMPLETE CBC W/AUTO DIFF WBC: CPT

## 2022-07-30 DIAGNOSIS — J45.21 MILD INTERMITTENT ASTHMA WITH ACUTE EXACERBATION: ICD-10-CM

## 2022-08-01 RX ORDER — MONTELUKAST SODIUM 10 MG/1
TABLET ORAL
Qty: 28 TABLET | Refills: 2 | Status: SHIPPED | OUTPATIENT
Start: 2022-08-01 | End: 2022-10-17

## 2022-08-03 ENCOUNTER — TELEPHONE (OUTPATIENT)
Dept: FAMILY MEDICINE | Facility: CLINIC | Age: 37
End: 2022-08-03

## 2022-08-03 NOTE — TELEPHONE ENCOUNTER
"S-(situation): patient calling to report \"dizzy spells for quite some time but worse today\".    B-(background): patient does not drive    A-(assessment): Dizzy spells X 3 today, like she was going to pass out. States, \"like I didn't know where my feet are\". She reports heart racing and beating really hard. Denies abdominal pain, no abnormal bleeding, little nausea, no emesis. Breathing is \"fine\".     R-(recommendations): Patient agreeable to go to ER for evaluation. She does not want to go to [place]. Advised to go to nearest Er. She states her boyfriend will drive her. Advised to call 911 if symptoms worsen before getting there.   Danelle CHEN RN    "

## 2022-08-08 NOTE — TELEPHONE ENCOUNTER
Continuous Blood Gluc Transmit (DEXCOM G6 TRANSMITTER) MISC    Continuous Blood Gluc Sensor (DEXCOM G6 SENSOR) MISC

## 2022-08-09 ENCOUNTER — LAB (OUTPATIENT)
Dept: LAB | Facility: CLINIC | Age: 37
End: 2022-08-09
Payer: COMMERCIAL

## 2022-08-09 DIAGNOSIS — F20.9 SCHIZOPHRENIA, UNSPECIFIED TYPE (H): ICD-10-CM

## 2022-08-09 LAB
BASOPHILS # BLD AUTO: 0.1 10E3/UL (ref 0–0.2)
BASOPHILS NFR BLD AUTO: 1 %
EOSINOPHIL # BLD AUTO: 0.3 10E3/UL (ref 0–0.7)
EOSINOPHIL NFR BLD AUTO: 2 %
ERYTHROCYTE [DISTWIDTH] IN BLOOD BY AUTOMATED COUNT: 14.5 % (ref 10–15)
HCT VFR BLD AUTO: 38.7 % (ref 35–47)
HGB BLD-MCNC: 12.2 G/DL (ref 11.7–15.7)
IMM GRANULOCYTES # BLD: 0.1 10E3/UL
IMM GRANULOCYTES NFR BLD: 1 %
LYMPHOCYTES # BLD AUTO: 4 10E3/UL (ref 0.8–5.3)
LYMPHOCYTES NFR BLD AUTO: 23 %
MCH RBC QN AUTO: 29.8 PG (ref 26.5–33)
MCHC RBC AUTO-ENTMCNC: 31.5 G/DL (ref 31.5–36.5)
MCV RBC AUTO: 94 FL (ref 78–100)
MONOCYTES # BLD AUTO: 1 10E3/UL (ref 0–1.3)
MONOCYTES NFR BLD AUTO: 6 %
NEUTROPHILS # BLD AUTO: 11.6 10E3/UL (ref 1.6–8.3)
NEUTROPHILS NFR BLD AUTO: 68 %
PLATELET # BLD AUTO: 420 10E3/UL (ref 150–450)
RBC # BLD AUTO: 4.1 10E6/UL (ref 3.8–5.2)
WBC # BLD AUTO: 17.1 10E3/UL (ref 4–11)

## 2022-08-09 PROCEDURE — 85025 COMPLETE CBC W/AUTO DIFF WBC: CPT

## 2022-08-09 PROCEDURE — 36415 COLL VENOUS BLD VENIPUNCTURE: CPT

## 2022-08-10 ENCOUNTER — VIRTUAL VISIT (OUTPATIENT)
Dept: EDUCATION SERVICES | Facility: CLINIC | Age: 37
End: 2022-08-10
Payer: COMMERCIAL

## 2022-08-10 DIAGNOSIS — E11.9 TYPE 2 DIABETES MELLITUS (H): Primary | ICD-10-CM

## 2022-08-10 PROCEDURE — 98966 PH1 ASSMT&MGMT NQHP 5-10: CPT | Mod: 95 | Performed by: DIETITIAN, REGISTERED

## 2022-08-10 RX ORDER — PROCHLORPERAZINE 25 MG/1
1 SUPPOSITORY RECTAL
Qty: 1 EACH | Refills: 1 | Status: SHIPPED | OUTPATIENT
Start: 2022-08-10 | End: 2022-12-13

## 2022-08-10 RX ORDER — PROCHLORPERAZINE 25 MG/1
1 SUPPOSITORY RECTAL
Qty: 9 EACH | Refills: 1 | Status: SHIPPED | OUTPATIENT
Start: 2022-08-10 | End: 2022-08-18

## 2022-08-10 NOTE — LETTER
8/10/2022         RE: Divina Delgadillo  [address] [facility] [address]        Dear Colleague,    Thank you for referring your patient, Divina Delgadillo, to the [facility] [place]. Please see a copy of my visit note below.    Diabetes Education Follow-up    Subjective/Objective:    Type of Service: Telephone Visit    Originating Location (Patient Location): Other out shopping in [place]  Distant Location (Provider Location): Home  Mode of Communication:  Telephone    Telephone Visit Start Time: 304  Telephone Visit End Time (telephone visit stop time): 314    Diabetes is being managed with   Lifestyle (diet/activity), Diabetes Medications   Diabetes Medication(s)     Diabetic Other       glucose (BD GLUCOSE) 4 g chewable tablet    Take 1 tablet by mouth every hour as needed for low blood sugar     Patient not taking: No sig reported    Insulin       insulin aspart (NOVOLOG FLEXPEN) 100 UNIT/ML pen    Inject 1-4 Units Subcutaneous 3 times daily (with meals) 1 units for small meal, 2 units for larger meal plus use sliding scale, maximum 4 units with meal, or up to 12 units per day     insulin glargine (LANTUS PEN) 100 UNIT/ML pen    Inject 29 Units Subcutaneous every morning    Incretin Mimetic Agents (GLP-1 Receptor Agonists)       Semaglutide, 1 MG/DOSE, 4 MG/3ML SOPN    Inject 1 mg Subcutaneous once a week          BG/Food Log:   Has been using the Dexcom and loves it.  Has been watching blood sugars closely   Instructed to download Dexcom Clarity   Sent connecting email from Dexcom Clarity    Lab Results   Component Value Date    A1C 9.2 06/17/2022    A1C 8.2 01/12/2022    A1C 7.8 10/28/2021    A1C 7.4 07/08/2021    A1C 8.0 03/26/2021    A1C 9.0 08/14/2020    A1C 7.8 05/18/2020    A1C 7.4 02/17/2020         Assessment:  Divina forgot about the appointment and was with her mom shopping and therefore kept visit short.  Made a follow up in 2 weeks with goal of connecting on Dexcom  Clarity before then.  Divina discusses how helpful the Dexcom has been in realizing what blood sugars are doing.   Rapid acting insulin is going really well and has noticed how helpful this is to take before eating. Taking the planned 1-4 units of novolog with meals.   Wants to improve diet and will focus on this at the follow up.  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 Notes snacking before bed (no novolog) so higher morning numbers are higher .    Plan/Response:  Goals: time stamp the novolog in the event markers for review, upload and set up dexcom clarity  Follow up in 2 weeks       Dolly Riley RD, LD, [facility]  Diabetes Education  Time spent: 10 minutes

## 2022-08-10 NOTE — Clinical Note
Divina Mcfadden has been in contact again.  We will connect on Dexcom clarity and we will work on dosing the Novolog better.  I did notice in the chart that she had pancreatitis and technically GLP1s are contraindicated with pancreatitis.  Will you address that at your visit with her on 8/24 if you want her to keep taking it?    Thanks!! Katiana

## 2022-08-18 RX ORDER — PROCHLORPERAZINE 25 MG/1
1 SUPPOSITORY RECTAL
Qty: 9 EACH | Refills: 1 | Status: SHIPPED | OUTPATIENT
Start: 2022-08-18 | End: 2022-10-17

## 2022-08-18 NOTE — TELEPHONE ENCOUNTER
(Please send refills with dispense qty of #3 with 3 refills to last us 6 months, the last RX only had 2 refills only. )    PRESCRIPTIONS MUST BE WRITTEN THIS WAY TO MAKE THEM MEDICARE COMPLIANT.    Dexcom G6 Sensors  SIG: Change q 10 days  QTY: 3   REFILLS: 3    -Prescription must be written after the clinical note date and will only be able to be used for 6 months from the date of the clinical notes. (We will be requesting new clinical notes and prescriptions every 6 months to meet Medicare Guidelines.)    All Documents (including clinical notes) MUST be signed by the same doctor.    Please contact us at [phone] (this number is for clinics only) with any questions. Patients may call us at [phone].      Thank you,  [facility]

## 2022-08-18 NOTE — TELEPHONE ENCOUNTER
Routing refill request to provider for review/approval because:  Drug not on the [facility] refill protocol     Pending Prescriptions:                       Disp   Refills    Continuous Blood Gluc Sensor (DEXCOM G6 SE*9 each 1        Si each every 10 days Change every 10 days. USE TO           READ BLOOD SUGARS PER  INSTRUCTIONS    Requested Prescriptions   Pending Prescriptions Disp Refills    Continuous Blood Gluc Sensor (DEXCOM G6 SENSOR) MISC 9 each 1     Si each every 10 days Change every 10 days. USE TO READ BLOOD SUGARS PER  INSTRUCTIONS        There is no refill protocol information for this order         Danelle Swann RN on 2022 at 3:20 PM

## 2022-08-24 ENCOUNTER — VIRTUAL VISIT (OUTPATIENT)
Dept: EDUCATION SERVICES | Facility: CLINIC | Age: 37
End: 2022-08-24

## 2022-08-24 DIAGNOSIS — Z53.9 NO SHOW: Primary | ICD-10-CM

## 2022-08-24 NOTE — PROGRESS NOTES
Divina is at a hair appointment and unable to talk today.  Encouraged to mychart when she knows her availability.     Dolly Riley RD, LD, [facility]  Diabetes Education

## 2022-08-24 NOTE — LETTER
8/24/2022         RE: Divina Delgadillo  [address] [facility] [address]        Dear Colleague,    Thank you for referring your patient, Divina Delgadillo, to the [facility] [place]. Please see a copy of my visit note below.    Divina is at a hair appointment and unable to talk today.  Encouraged to mychart when she knows her availability.     Dolly Riley RD, LD, [facility]  Diabetes Education

## 2022-08-28 ENCOUNTER — NURSE TRIAGE (OUTPATIENT)
Dept: NURSING | Facility: CLINIC | Age: 37
End: 2022-08-28

## 2022-08-29 NOTE — TELEPHONE ENCOUNTER
"Triage Call:    Patient calling to report constant severe right lower abdominal pain.  She report onset was last night.  Patient reports activity increases severity of pain.  She has not taken any medications to try to decrease pain. She denies fever, weakness, confusion, fainting, or chest pain.    According to the protocol, patient should go to ED now.  Care advice given to remain NPO. Patient verbalizes understanding and agrees with plan of care. She plans to go to [facility] ED.    Pamela Reeves RN  08/28/22 8:46 PM  [facility] Nurse Advisor      Reason for Disposition    [1] SEVERE pain (e.g., excruciating) AND [2] present > 1 hour    Additional Information    Negative: Shock suspected (e.g., cold/pale/clammy skin, too weak to stand, low BP, rapid pulse)    Negative: Difficult to awaken or acting confused (e.g., disoriented, slurred speech)    Negative: Passed out (i.e., lost consciousness, collapsed and was not responding)    Negative: Sounds like a life-threatening emergency to the triager    Negative: Chest pain    Negative: Pain is mainly in upper abdomen  (if needed ask: \"is it mainly above the belly button?\")    Negative: Followed an abdomen (stomach) injury    Negative: [1] Abdominal pain AND [2] pregnant < 20 weeks    Negative: [1] Abdominal pain AND [2] pregnant 20 or more weeks    Negative: [1] Abdominal pain AND [2] postpartum (from 0 to 6 weeks after delivery)    Protocols used: ABDOMINAL PAIN - FEMALE-A-AH      "

## 2022-09-07 ENCOUNTER — LAB (OUTPATIENT)
Dept: LAB | Facility: CLINIC | Age: 37
End: 2022-09-07
Payer: COMMERCIAL

## 2022-09-07 DIAGNOSIS — F20.9 SCHIZOPHRENIA, UNSPECIFIED TYPE (H): ICD-10-CM

## 2022-09-07 LAB
BASOPHILS # BLD AUTO: 0.2 10E3/UL (ref 0–0.2)
BASOPHILS NFR BLD AUTO: 1 %
EOSINOPHIL # BLD AUTO: 0.3 10E3/UL (ref 0–0.7)
EOSINOPHIL NFR BLD AUTO: 2 %
ERYTHROCYTE [DISTWIDTH] IN BLOOD BY AUTOMATED COUNT: 14.6 % (ref 10–15)
HCT VFR BLD AUTO: 39.4 % (ref 35–47)
HGB BLD-MCNC: 12.2 G/DL (ref 11.7–15.7)
IMM GRANULOCYTES # BLD: 0.1 10E3/UL
IMM GRANULOCYTES NFR BLD: 1 %
LYMPHOCYTES # BLD AUTO: 5 10E3/UL (ref 0.8–5.3)
LYMPHOCYTES NFR BLD AUTO: 28 %
MCH RBC QN AUTO: 29.9 PG (ref 26.5–33)
MCHC RBC AUTO-ENTMCNC: 31 G/DL (ref 31.5–36.5)
MCV RBC AUTO: 97 FL (ref 78–100)
MONOCYTES # BLD AUTO: 1.2 10E3/UL (ref 0–1.3)
MONOCYTES NFR BLD AUTO: 7 %
NEUTROPHILS # BLD AUTO: 11.3 10E3/UL (ref 1.6–8.3)
NEUTROPHILS NFR BLD AUTO: 62 %
PLATELET # BLD AUTO: 446 10E3/UL (ref 150–450)
RBC # BLD AUTO: 4.08 10E6/UL (ref 3.8–5.2)
WBC # BLD AUTO: 18.1 10E3/UL (ref 4–11)

## 2022-09-07 PROCEDURE — 85025 COMPLETE CBC W/AUTO DIFF WBC: CPT

## 2022-09-07 PROCEDURE — 36415 COLL VENOUS BLD VENIPUNCTURE: CPT

## 2022-09-09 RX ORDER — INSULIN ASPART 100 [IU]/ML
INJECTION, SOLUTION INTRAVENOUS; SUBCUTANEOUS
Qty: 15 ML | Refills: 0 | Status: SHIPPED | OUTPATIENT
Start: 2022-09-09 | End: 2022-11-10

## 2022-09-15 NOTE — TELEPHONE ENCOUNTER
"ED/Discharge Protocol    \"Hi, my name is Jose De Jesus Guido RN, a registered nurse, and I am calling on behalf of Dr. bruner's office at [place].  I am calling to follow up and see how things are going for you after your recent visit.\"    \"I see that you were in the (ER/UC/IP) on 6/21/22.    How are you doing now that you are home?\" great    Is patient experiencing symptoms that may require a hospital visit?  no    Discharge Instructions    \"Let's review your discharge instructions.  What is/are the follow-up recommendations?  Pt. Response: follow up with my provider    \"Were you instructed to make a follow-up appointment?\"  Pt. Response: Yes.  Has appointment been made?   Yes      \"When you see the provider, I would recommend that you bring your discharge instructions with you.    Medications    \"How many new medications are you on since your hospitalization/ED visit?\"    0-1  \"How many of your current medicines changed (dose, timing, name, etc.) while you were in the hospital/ED visit?\"   0-1  \"Do you have questions about your medications?\"   No  \"Were you newly diagnosed with heart failure, COPD, diabetes or did you have a heart attack?\"   No  For patients on insulin: \"Did you start on insulin in the hospital or did you have your insulin dose changed?\"   No  Post Discharge Medication Reconciliation Status: discharge medications reconciled, continue medications without change.    Was MTM referral placed (*Make sure to put transitions as reason for referral)?   No    Call Summary    \"Do you have any questions or concerns about your condition or care plan at the moment?\"    No  Triage nurse advice given: none    Patient was in ER 02x in the past year (assess appropriateness of ER visits.)      \"If you have questions or things don't continue to improve, we encourage you contact us through the main clinic number,  [phone].  Even if the clinic is not open, triage nurses are available 24/7 to help you.     We " [place] [address] [facility], [address] [place]  Phone: [phone]  Fax [phone]  :  Physical Therapy Treatment Note/ Progress Report:           Date:  9/15/2022    Patient Name:  Devin Watts    :  1955  MRN: [id number]      Referring Physician: Clark Wallace MD                                                     Evaluation Date: 2022                                           Date of Referral:22     Insurance: Medicare     Next MD appt: scheduled:9/15/22     Medical Diagnosis:  P21.943 (ICD-10-CM) - S/P total knee arthroplasty, left        Treatment Diagnosis:  M25.662 L knee stiffness; M25.462 L knee effusion;  M25.562 L knee pain; difficulty walking R26.2  S/P L knee TKA 22           Precautions/ Contra-indications:         Has the plan of care been signed (Y/N):        [x]  Yes  []  No    Progress report will be due (10 Rx or 30 days ):   Visit 10 or 22 DONE visit 5 22  DUE visit 15       Recertification will be due (POC Duration  / 90 days ): 22     Is this a Progress Report:     []  Yes  [x]  No      If Yes:  Date Range for reporting period:  Beginnin22  Ending:              Visit # Date Range Insurance Allowable Requires auth   IN PERSON 6 22- BMN    [x]no        []yes:   TELEHEALTH       TOTAL              CX/NS       Next PT appt: 22                PT Practice Pattern:H  Co morbidities:1-2  Surgical:L TKA  DOS 22  Nonsurgical  INITIAL VISIT 22 CURRENT VISIT 22    PAIN 0-10/10 4-8/10 4-5/10   FUNCTIONAL SCALE FOTO 18/100 30/100   ROM KE -5 -5    KF 45 85                           STRENGHT (MMT) Quad tone trace Poor                                           Latex Allergy/Pacemaker/Adhesive allergy:  [x]NO      []YES     RESTRICTIONS/PRECAUTIONS: protocol         SUBJECTIVE:  Pt saw the doctor today and is permitted to use the cane at home per her "would like you to know that our clinic has extended hours (provide information).  We also have urgent care (provide details on closest location and hours/contact info)\"      \"Thank you for your time and take care!\"    Jose De Jesus Guido RN        " report. (See assessment). She walked     Pain level:  4-5/10    OBJECTIVE:   Observation: bruising below knee, tight distal ITB and VL, very TTP in these areas as well. Dressings removed and incision healing well, no signs of infection, closed. Continued poor quad tone and difficulty w flexion            Exercises/Interventions:     HEP:  [employer]  Access Code: [email or web address]  URL: [email or web address]  Date: 08/31/2022  Prepared by: Marcela Webster      Therapeutic Ex (00818)  NMR re-education (67614) Reps/Sets/time Notes/CUES/Assessment HEP     x     x               x          x     x          x    x   Bike partial rev 8'                 Sit to stand neutral  Sit to stand L posterior 10x  10x VC    VC          Gait training w SPC X10 ft  5' education Pt unsteady, not to use cane    Gait w RW x25ft Hell strike                            Pt education x10' How to perform KF w/o hiking hip or substituting           Manual Intervention (86258)                STM VL, ITB, lat HS  Manual patellar mobs all dir  Manual HS and ITB stretch  PROM KF x15'                                         Therapeutic Activity (67955)                                                Therapeutic Exercise and NMR EXR  [x] (37811) Provided verbal/tactile cueing for activities related to strengthening, flexibility, endurance, ROM for improvements in LE, proximal hip, and core control with self care, mobility, lifting, ambulation. [x] (68420) Provided verbal/tactile cueing for activities related to improving balance, coordination, kinesthetic sense, posture, motor skill, proprioception  to assist with LE, proximal hip, and core control in self care, mobility, lifting, ambulation and eccentric single leg control.      NMR and Therapeutic Activities:    [x] (30904 or 94334) Provided verbal/tactile cueing for activities related to improving balance, coordination, kinesthetic sense, posture, motor skill, proprioception and motor activation to allow for proper function of core, proximal hip and LE with self care and ADLs  [x] (19266) Gait Re-education- Provided training and instruction to the patient for proper LE, core and proximal hip recruitment and positioning and eccentric body weight control with ambulation re-education including up and down stairs     Home Exercise Program:    [x] (66423) Reviewed/Progressed HEP activities related to strengthening, flexibility, endurance, ROM of core, proximal hip and LE for functional self-care, mobility, lifting and ambulation/stair navigation   [] (82095)Reviewed/Progressed HEP activities related to improving balance, coordination, kinesthetic sense, posture, motor skill, proprioception of core, proximal hip and LE for self care, mobility, lifting, and ambulation/stair navigation      Manual Treatments:  PROM / STM / Oscillations-Mobs:  G-I, II, III, IV (PA's, Inf., Post.)  [x] (36949) Provided manual therapy to mobilize LE, proximal hip and/or LS spine soft tissue/joints for the purpose of modulating pain, promoting relaxation,  increasing ROM, reducing/eliminating soft tissue swelling/inflammation/restriction, improving soft tissue extensibility and allowing for proper ROM for normal function with self care, mobility, lifting and ambulation. Trigger point Dry Needling:  fine needle insertion into myofascial trigger points to stimulate a healing response  [] (44736) Needle insertion without injection: 1 or 2 muscles  [] (47121) Needle insertion without injection: 3 or more muscles    Modalities:  Pt demonstrates edema and swelling that does not respond to regular conventional RICE and requires use of a compression/vasopneumatic device to reduce. [x] GAME READY (VASO)- for significant edema, swelling, pain control.  15'   [] ESU (HV/PM) for edema and pain control      Charges:  Timed Code Treatment Minutes: 42   Total Treatment Minutes: 57     Medicare total (add KX at $2000)  600        [] EVAL (LOW) 82362 (typically 20 minutes face-to-face)  [] EVAL (MOD) 62190 (typically 30 minutes face-to-face)  [] EVAL (HIGH) 94422 (typically 45 minutes face-to-face)  [] RE-EVAL     [x] KQ(23710) x  1  [] IONTO  [x] NMR (86358) x 1    [x] VASO  [x] Manual (63013) x  1 [] Other:  [] TA x      [] Mech Traction (52415)  [] ES(attended) (14101)     [] ES (un) (29126):             GOALS:      Patient stated goal: to be able to walk   [x] Progressing: [] Met: [] Not Met: [] Adjusted     Therapist goals for Patient:     Short Term Goals: To be achieved in: 2 weeks 2/2 MET    1. Independent in HEP and progression per patient tolerance, in order to prevent re-injury. [] Progressing: [x] Met: [] Not Met: [] Adjusted  2. Patient will have a decrease in pain to facilitate improvement in movement, function, and ADLs as indicated by Functional Deficits. [] Progressing: [x] Met: [] Not Met: [] Adjusted     Long Term Goals: To be achieved in: 12 weeks        1. FOTO score to be equal or greater to the predicted score (57/100)  to assist with reaching prior level of function. [x] Progressing: [] Met: [] Not Met: [] Adjusted      2. Patient will demonstrate increased AROM to 0-120 to allow for getting in and out of car as indicated by patients Functional Deficits. [x] Progressing: [] Met: [] Not Met: [] Adjusted       3. Patient will demonstrate an increase in strength to 4+/5 to allow for sit to stand as indicated by patients Functional Deficits. [x] Progressing: [] Met: [] Not Met: [] Adjusted       4. Patient will return to all transfers, work activities, and functional activities without increased symptoms or restriction, specifically deep water aerobics. [x] Progressing: [] Met: [] Not Met: [] Adjusted       5. (Pt specific functional or activity goal)Pt will be able to walk for 20 minutes w/o walker and w/o increased s/s for grocery shopping. [x] Progressing: [] Met: [] Not Met: [] Adjusted  6.  Pt will demonstrate pain decreased by 50%  (0-4/10) with all ADLS. [x] Progressing: [] Met: [] Not Met: [] Adjusted        ASSESSMENT: Pt was observed and educated on gait with a SPC. She is putting at least 75% WB on the cane which is unsafe, she is wobbly at this time. With proper walker training she was able to have improved heel toe pattern in the clinic. As she cont to focus on this her flexion should improve. We will perform gait training next visit, as she is fatigued today from MD, grocery and PT. Patient will benefit from continued skilled intervention to increase ROM, flexibility, strength and function. Overall Progression Towards Functional goals/ Treatment Progress Update:  [] Patient is progressing as expected towards functional goals listed. [x] Progression is slowed due to complexities/Impairments listed. [] Progression has been slowed due to co-morbidities. [] Plan just implemented, too soon to assess goals progression <30days   [] Goals require adjustment due to lack of progress  [] Patient is not progressing as expected and requires additional follow up with physician  [] Other    Prognosis for POC: [x] Good [] Fair  [] Poor      Patient requires continued skilled intervention: [x] Yes  [] No    Treatment/Activity Tolerance:  [x] Patient able to complete treatment  [] Patient limited by fatigue  [] Patient limited by pain    [] Patient limited by other medical complications  [] Other:             PLAN: Cont. PT 2x/week. Focus on ROM and strength, balance ans well as gait. [x] Continue per plan of care [] Alter current plan (see comments above)  [] Plan of care initiated [] Hold pending MD visit [] Discharge      Electronically signed by:  Shivani Jimenez PT, [id number]        Note: If patient does not return for scheduled/ recommended follow up visits, this note will serve as a discharge from care along with most recent update on progress.

## 2022-09-19 ENCOUNTER — MYC MEDICAL ADVICE (OUTPATIENT)
Dept: OTOLARYNGOLOGY | Facility: CLINIC | Age: 37
End: 2022-09-19

## 2022-09-20 ENCOUNTER — OFFICE VISIT (OUTPATIENT)
Dept: NEUROLOGY | Facility: CLINIC | Age: 37
End: 2022-09-20
Attending: NURSE PRACTITIONER
Payer: COMMERCIAL

## 2022-09-20 DIAGNOSIS — G56.23 ULNAR NEUROPATHY OF BOTH UPPER EXTREMITIES: Primary | ICD-10-CM

## 2022-09-20 DIAGNOSIS — R20.0 BILATERAL HAND NUMBNESS: ICD-10-CM

## 2022-09-20 PROCEDURE — 95912 NRV CNDJ TEST 11-12 STUDIES: CPT | Performed by: PSYCHIATRY & NEUROLOGY

## 2022-09-20 PROCEDURE — 95886 MUSC TEST DONE W/N TEST COMP: CPT | Mod: 50 | Performed by: PSYCHIATRY & NEUROLOGY

## 2022-09-20 RX ORDER — INSULIN GLARGINE 100 [IU]/ML
INJECTION, SOLUTION SUBCUTANEOUS
Qty: 15 ML | Refills: 1 | Status: SHIPPED | OUTPATIENT
Start: 2022-09-20 | End: 2022-12-21

## 2022-09-20 NOTE — PROCEDURES
ELECTROMYOGRAPHY (EMG) REPORT       [facility] [place]  [address] [place] [address]  Tel: [phone]  Fax: [phone]  [email or web address]     Divina Delgadillo,  1985, MRN [id number]  PCP: Jaleesa Velasquez  Date: 2022     Principal Diagnosis:    Bilateral hand numbness  Ulnar neuropathy of both upper extremities     Height: 5 feet 3 inch  Reason for referral: Evaluate bilateral uppers. c/o numbness in both arms/hands > 3 months. Right > Left.  Diabetic > 15 years.       Motor NCS      Nerve / Sites Lat Amp Dist Sean    ms mV cm m/s   R Median - APB      Wrist 3.54 10.8 7       Elbow 7.81 10.1 24.5 57   L Median - APB      Wrist 3.75 6.5 7       Elbow 7.76 6.1 24 60   R Ulnar - ADM      Wrist 5.10 1.2 7       B.Elbow 11.61 0.6 20 31      A.Elbow 16.46 0.6 12 25   L Ulnar - ADM      Wrist 3.33 8.0 7       B.Elbow 6.77 7.8 20 58      A.Elbow 9.48 7.6 12 44       F  Wave      Nerve Fmin    ms   R Ulnar - ADM 43.18   L Ulnar - ADM 31.93       Sensory NCS      Nerve / Sites Onset Lat Pk Lat Amp.2-3 Dist Sean Lat Diff    ms ms  V cm m/s ms   R Median - II (Antidr)      Wrist 2.34 3.02 49.8 13 55    L Median - II (Antidr)      Wrist 2.24 2.86 31.9 13 58    R Ulnar - V (Antidr)      Wrist 3.85 4.58 5.5 11 29    L Ulnar - V (Antidr)      Wrist 2.40 2.81 9.2 11 46    R Median, Ulnar - Transcarpal comparison      Median Palm 1.41 1.93 46.4 8 57       Ulnar Palm NR NR NR 8 NR          NR   L Median, Ulnar - Transcarpal comparison      Median Palm 1.51 1.98 42.6 8 53       Ulnar Palm 1.67 1.98 19.2 8 48          0.00       EMG Summary Table     Spontaneous MUAP Rcmt Note   Muscle Fib PSW Fasc IA # Amp Dur PPP Rate Type   R. Brachioradialis None None None N N N N N N N   R. Pronator teres None None None N N N N N N N   R. Biceps brachii None None None N N N N N N N   R. Deltoid None None None N N N N N N N   R. Triceps brachii None None None N N N N N N N   R. Extensor  digitorum communis None None None N N N N N N N   R. Flexor carpi ulnaris None None None N Sl Red Incr Incr N N N   R. Abductor digiti minimi (manus) 1+ 1+ None N Mod Jose Red Incr Incr N N N   R. First dorsal interosseous 1+ 1+ None N Mod Jose Red Incr Incr N N N   R. Abductor pollicis brevis None None None N N N N N N N   L. Brachioradialis None None None N N N N N N N   L. Pronator teres None None None N N N N N N N   L. Biceps brachii None None None N N N N N N N   L. Deltoid None None None N N N N N N N   L. Triceps brachii None None None N N N N N N N   L. Flexor carpi ulnaris None None None N N N N N N N   L. Abductor digiti minimi (manus) None None None N Sl Red Incr Incr N N N   L. First dorsal interosseous None None None N Sl Red Incr Incr N N N   L. Abductor pollicis brevis None None None N N N N N N N        Summary: Nerve conduction and EMG study of bilateral upper extremity shows:  1. Normal bilateral median distal motor latencies, amplitude and conduction velocities  2. Delayed right ulnar distal motor latency with low amplitude and slow conduction velocity  3. Normal left ulnar distal motor latency, amplitude with slow conduction across the elbow  4. Delayed bilateral (right greater than left) ulnar F latencies  5. Absent right ulnar SNAP  6. Normal bilateral median and left ulnar SNAP  7. Needle exam showed changes as above    Impression:   This is a abnormal nerve conduction and EMG study of bilateral upper extremity that suggests bilateral ulnar neuropathy (right greater than left) with entrapment at the elbow consistent with cubital tunnel syndrome.      Kenji Arguelles MD  [facility]  (Formerly, [facility]      This note was dictated using voice recognition software.  Any grammatical or context distortions are unintentional and inherent to the software.

## 2022-09-20 NOTE — LETTER
9/20/2022         RE: Divina Delgadillo  [address] [facility] [address]        Dear Colleague,    Thank you for referring your patient, Divina Delgadillo, to the [facility] [place]. Please see a copy of my visit note below.    See procedure note for EMG report      Again, thank you for allowing me to participate in the care of your patient.        Sincerely,        Kenji Arguelles MD

## 2022-09-20 NOTE — TELEPHONE ENCOUNTER
"Requested Prescriptions   Pending Prescriptions Disp Refills    LANTUS SOLOSTAR 100 UNIT/ML soln [Pharmacy Med Name: Lantus Solostar U-100 Insulin 100 unit/mL (3 mL) subcutaneous pen] 15 mL 1     Sig: Inject 29 Units Subcutaneous every morning        Long Acting Insulin Protocol Failed - 9/20/2022  8:01 AM        Failed - HgbA1C in past 3 or 6 months     If HgbA1C is 8 or greater, it needs to be on file within the past 3 months.  If less than 8, must be on file within the past 6 months.     Recent Labs   Lab Test 06/17/22  1435   A1C 9.2*             Passed - Serum creatinine on file in past 12 months     Recent Labs   Lab Test 07/06/22  1406 09/19/17  1503 09/01/17  1049   CR 1.06*   < >  --    CREAT  --   --  1.6*    < > = values in this interval not displayed.       Ok to refill medication if creatinine is low          Passed - Medication is active on med list        Passed - Patient is age 18 or older        Passed - Recent (6 mo) or future (30 days) visit within the authorizing provider's specialty     Patient had office visit in the last 6 months or has a visit in the next 30 days with authorizing provider or within the authorizing provider's specialty.  See \"Patient Info\" tab in inbasket, or \"Choose Columns\" in Meds & Orders section of the refill encounter.                  "

## 2022-09-21 ENCOUNTER — TELEPHONE (OUTPATIENT)
Dept: FAMILY MEDICINE | Facility: CLINIC | Age: 37
End: 2022-09-21

## 2022-09-21 DIAGNOSIS — G56.23 CUBITAL TUNNEL SYNDROME, BILATERAL: Primary | ICD-10-CM

## 2022-09-23 NOTE — TELEPHONE ENCOUNTER
Patient wants a f/u after EMG and she has the first available booked already. Debora LOPEZ RN    
Patient will return our call as she is at work and cannot talk right now.    Ritika Davidson RN    
Reason for Call:  Same Day Appointment, Requested Provider:  Jaleesa Velasquez CNP    PCP: Jaleesa Velasquez    Reason for visit: follow up from results    Additional comments: Patient is requesting to get worked in for an office visit with her PCP sooner than her existing 10/19/22 appointment. Please advise    Can we leave a detailed message on this number? YES    Phone number patient can be reached at: Home number on file [phone] (home)    Best Time: any    Call taken on 9/21/2022 at 10:07 AM by Timothy Collier    
,DirectAddress_Unknown,DirectAddress_Unknown

## 2022-09-28 ENCOUNTER — LAB (OUTPATIENT)
Dept: LAB | Facility: CLINIC | Age: 37
End: 2022-09-28
Payer: COMMERCIAL

## 2022-09-28 DIAGNOSIS — F20.9 SCHIZOPHRENIA, UNSPECIFIED TYPE (H): ICD-10-CM

## 2022-09-28 LAB
BASOPHILS # BLD AUTO: 0.1 10E3/UL (ref 0–0.2)
BASOPHILS NFR BLD AUTO: 1 %
EOSINOPHIL # BLD AUTO: 0.3 10E3/UL (ref 0–0.7)
EOSINOPHIL NFR BLD AUTO: 2 %
ERYTHROCYTE [DISTWIDTH] IN BLOOD BY AUTOMATED COUNT: 14.2 % (ref 10–15)
HCT VFR BLD AUTO: 37.4 % (ref 35–47)
HGB BLD-MCNC: 11.9 G/DL (ref 11.7–15.7)
IMM GRANULOCYTES # BLD: 0.2 10E3/UL
IMM GRANULOCYTES NFR BLD: 1 %
LYMPHOCYTES # BLD AUTO: 4.7 10E3/UL (ref 0.8–5.3)
LYMPHOCYTES NFR BLD AUTO: 30 %
MCH RBC QN AUTO: 30.6 PG (ref 26.5–33)
MCHC RBC AUTO-ENTMCNC: 31.8 G/DL (ref 31.5–36.5)
MCV RBC AUTO: 96 FL (ref 78–100)
MONOCYTES # BLD AUTO: 1.1 10E3/UL (ref 0–1.3)
MONOCYTES NFR BLD AUTO: 7 %
NEUTROPHILS # BLD AUTO: 9.5 10E3/UL (ref 1.6–8.3)
NEUTROPHILS NFR BLD AUTO: 60 %
PLATELET # BLD AUTO: 454 10E3/UL (ref 150–450)
RBC # BLD AUTO: 3.89 10E6/UL (ref 3.8–5.2)
WBC # BLD AUTO: 15.9 10E3/UL (ref 4–11)

## 2022-09-28 PROCEDURE — 36415 COLL VENOUS BLD VENIPUNCTURE: CPT

## 2022-09-28 PROCEDURE — 85025 COMPLETE CBC W/AUTO DIFF WBC: CPT

## 2022-10-01 ENCOUNTER — MYC MEDICAL ADVICE (OUTPATIENT)
Dept: FAMILY MEDICINE | Facility: CLINIC | Age: 37
End: 2022-10-01

## 2022-10-01 DIAGNOSIS — N18.31 TYPE 2 DIABETES MELLITUS WITH STAGE 3A CHRONIC KIDNEY DISEASE, WITH LONG-TERM CURRENT USE OF INSULIN (H): Primary | ICD-10-CM

## 2022-10-01 DIAGNOSIS — Z79.4 TYPE 2 DIABETES MELLITUS WITH STAGE 3A CHRONIC KIDNEY DISEASE, WITH LONG-TERM CURRENT USE OF INSULIN (H): Primary | ICD-10-CM

## 2022-10-01 DIAGNOSIS — E11.22 TYPE 2 DIABETES MELLITUS WITH STAGE 3A CHRONIC KIDNEY DISEASE, WITH LONG-TERM CURRENT USE OF INSULIN (H): Primary | ICD-10-CM

## 2022-10-04 PROBLEM — E11.42 TYPE 2 DIABETES MELLITUS WITH DIABETIC POLYNEUROPATHY (H): Status: ACTIVE | Noted: 2018-01-24

## 2022-10-05 ENCOUNTER — OFFICE VISIT (OUTPATIENT)
Dept: ORTHOPEDICS | Facility: CLINIC | Age: 37
End: 2022-10-05
Attending: NURSE PRACTITIONER

## 2022-10-05 DIAGNOSIS — Z53.9 ERRONEOUS ENCOUNTER--DISREGARD: Primary | ICD-10-CM

## 2022-10-05 DIAGNOSIS — G56.23 CUBITAL TUNNEL SYNDROME, BILATERAL: ICD-10-CM

## 2022-10-05 DIAGNOSIS — G56.23 ULNAR NEUROPATHY OF BOTH UPPER EXTREMITIES: Primary | ICD-10-CM

## 2022-10-05 NOTE — PROGRESS NOTES
Patient's expectation was to see a surgical specialist for her right elbow. She is referred to [facility] for this.    Isabell Nicholas ATC, CSCS  Dr. Loaiza's Clinical Coordinator  [facility] Team        This encounter was opened in error. Please disregard.

## 2022-10-05 NOTE — LETTER
10/5/2022         RE: Divina Delgadillo  [id number] [address]        Dear Colleague,    Thank you for referring your patient, Divina Delgadillo, to the [facility] [place]. Please see a copy of my visit note below.    Patient's expectation was to see a surgical specialist for her right elbow. She is referred to [facility] for this.    Isabell Nicholas [facility], CSCS  Dr. Loaiza's Clinical Coordinator  [facility] Team        This encounter was opened in error. Please disregard.      Again, thank you for allowing me to participate in the care of your patient.        Sincerely,        Racquel Loaiza MD

## 2022-10-17 ENCOUNTER — TELEPHONE (OUTPATIENT)
Dept: EDUCATION SERVICES | Facility: CLINIC | Age: 37
End: 2022-10-17

## 2022-10-17 ENCOUNTER — ALLIED HEALTH/NURSE VISIT (OUTPATIENT)
Dept: EDUCATION SERVICES | Facility: CLINIC | Age: 37
End: 2022-10-17
Payer: COMMERCIAL

## 2022-10-17 DIAGNOSIS — E11.9 TYPE 2 DIABETES MELLITUS (H): Primary | ICD-10-CM

## 2022-10-17 DIAGNOSIS — E11.42 TYPE 2 DIABETES MELLITUS WITH DIABETIC POLYNEUROPATHY (H): ICD-10-CM

## 2022-10-17 PROCEDURE — G0108 DIAB MANAGE TRN  PER INDIV: HCPCS | Performed by: DIETITIAN, REGISTERED

## 2022-10-17 RX ORDER — PROCHLORPERAZINE 25 MG/1
1 SUPPOSITORY RECTAL
Qty: 9 EACH | Refills: 1 | Status: SHIPPED | OUTPATIENT
Start: 2022-10-17 | End: 2023-04-03

## 2022-10-17 NOTE — TELEPHONE ENCOUNTER
yRan Lazcano,     Please note if you approve of this plan or indicate an alternate plan.     Divina is correcting with higher amounts of Novolog post meal 1-3 times daily 10-25 units, usually 15 units.  Eating at night causes hyperglycemia without Novolog and  corrects in the morning.  Often over corrects leading to a low followed by a rebound high.     1.Novolog - 10 minutes before eating   Snack/small meal 15-30gm carbohydrate: 3 units   Average meal 45-60gm carbohydrate: 5 units  Large meal 60gm+ carbohydrate: 7 units       2.  Correction scale before breakfast, lunch, dinner only 1:40> 150  blood sugar Insulin   150-190 1 unit   191-230 2 units   231-270 3 units   271-310 4 units   311-350 5 units   351-390 6 units   391+ 7 units          3.  Decrease Lantus from 29 to 26 (appears to be pulling blood sugars down and will add more mealtime coverage instead)     Thanks!  Katiana Riley RD, LD, [facility]  Diabetes Education

## 2022-10-17 NOTE — PROGRESS NOTES
Diabetes Self-Management Education & Support    Presents for: Follow-up    Type of Service: In Person Visit    ASSESSMENT:  Worked on setting up Clarity and has the G6 kwesi running.  Connected Clarity and G6, however no data will import despite the apps connecting to [employer] and [employer].  Looked at 24 hour dexcom report to review examples from the last day.  Has large post prandial spikes from not taking Novlog with bedtime snack.  Wakes up and takes a larger dose of Novolog due to frustration of being elevated and then often drops too much.  Spent much time reviewing blood sugar action after eating and how Novolog insulin works.   Nee to move the Novolog pre meal instead of post meal for correction.  Reviewed Novolog for food versus correctoin and how these work together.   Divina is motivated to get back on track and lower blood sugars.      Current dosing / daily patterns.   Latnus 0-0-0-29   Novolog - 15 units, max25 units -  took and went too low.  Ussing Novolog 1-2 times daily, rarely 3.    Notes blood sugars are bouncing back and forth between low and high     Breakfast - rarely eating, just correcting with about 15 units   Lunch - sskisp novolog because it's low at this time, eats school lunch and is often higher upon getting home   10-15 units in the evening if elevated (to make up for lunch being elevated and sometimes covers dinner).   Eats snacks at night which raise blood sugars quite a bit, blood sugars run high overnight, then corrects with novolog upon waking     Telephone encounter to PCP for new Novolog dosing plan this date (email Divina once this is approved).   1.Novolog - 10 minutes before eating   Snack/small meal 15-30gm carbohydrate: 3 units   Average meal 45-60gm carbohydrate: 5 units  Large meal 60gm+ carbohydrate: 7 units      2.  Correction scale before breakfast, lunch, dinner only 1:40> 150  blood sugar Insulin   150-190 1 unit   191-230 2 units   231-270 3 units   271-310 4 units  "  311-350 5 units   351-390 6 units   391+ 7 units      3.  Decrease Lantus from 29 to 26 (appears to be pulling blood sugars down and will add more mealtime coverage instead)       Patient's most recent   Lab Results   Component Value Date    A1C 9.5 10/26/2022    A1C 7.4 07/08/2021     is not meeting goal of <7.0    Diabetes knowledge and skills assessment:   Patient is knowledgeable in diabetes management concepts related to: Healthy Eating, Being Active, Monitoring, Taking Medication, Problem Solving, Reducing Risks and Healthy Coping  Continue education with the following diabetes management concepts: Healthy Eating, Being Active, Monitoring, Taking Medication, Problem Solving and Reducing Risks  Based on learning assessment above, most appropriate setting for further diabetes education would be: Individual setting.      PLAN  Begin to take insulin before eating   Watch dexcom closely - what snacks and meals are causing a rise   Listen to dexcom alarm, pre treat with glucose if going low,   Follow up in one week if office  Continue with positive diet & lifestyle changes       See Care Plan for co-developed, patient-state behavior change goals.  AVS provided for patient today.    Education Materials Provided:  food logs      SUBJECTIVE/OBJECTIVE:  Presents for: Follow-up  Accompanied by: Self  Diabetes education in the past 24mo: Yes  Focus of Visit: CGM  Diabetes type: Type 2  Disease course: Getting harder to manage  Transportation concerns: Yes  Other concerns:: None  Cultural Influences/Ethnic Background:  Not  or     Diabetes Symptoms & Complications:  Fatigue: No       Patient Problem List and Family Medical History reviewed for relevant medical history, current medical status, and diabetes risk factors.    Vitals:  There were no vitals taken for this visit.  Estimated body mass index is 38.24 kg/m  as calculated from the following:    Height as of 10/25/22: 1.626 m (5' 4\").    Weight as of " 10/25/22: 101.1 kg (222 lb 12.8 oz).   Last 3 BP:   BP Readings from Last 3 Encounters:   10/25/22 126/70   10/19/22 132/58   07/06/22 138/62       History   Smoking Status     Never   Smokeless Tobacco     Current       Labs:  Lab Results   Component Value Date    A1C 9.5 10/26/2022    A1C 7.4 07/08/2021     Lab Results   Component Value Date     10/26/2022     07/06/2022     07/08/2021     Lab Results   Component Value Date    LDL 55 06/17/2022    LDL 72 03/26/2021     HDL Cholesterol   Date Value Ref Range Status   03/26/2021 57 >49 mg/dL Final     Direct Measure HDL   Date Value Ref Range Status   06/17/2022 60 >=50 mg/dL Final   ]  GFR Estimate   Date Value Ref Range Status   10/19/2022 51 (L) >60 mL/min/1.73m2 Final     Comment:     Effective December 21, 2021 eGFRcr in adults is calculated using the 2021 CKD-EPI creatinine equation which includes age and gender (Valentina et al., NEJ, DOI: 10.1056/FVVNwh9510466)   07/08/2021 52 (L) >60 mL/min/[1.73_m2] Final     Comment:     Non  GFR Calc  Starting 12/18/2018, serum creatinine based estimated GFR (eGFR) will be   calculated using the Chronic Kidney Disease Epidemiology Collaboration   (CKD-EPI) equation.       GFR Estimate If Black   Date Value Ref Range Status   07/08/2021 60 (L) >60 mL/min/[1.73_m2] Final     Comment:      GFR Calc  Starting 12/18/2018, serum creatinine based estimated GFR (eGFR) will be   calculated using the Chronic Kidney Disease Epidemiology Collaboration   (CKD-EPI) equation.       Lab Results   Component Value Date    CR 1.37 10/19/2022    CR 1.32 07/08/2021     No results found for: MICROALBUMIN    Healthy Eating:  Healthy Eating Assessed Today: No  Meal planning/habits: Smaller portions (Varies between portion control and avoiding sweets to no meal planning)  Beverages: Water, Diet soda, Sports drinks, Soda (reports adding in some juice again)  Has patient met with a dietitian in the  past?: Yes    Being Active:  Being Active Assessed Today: Yes  Exercise:: Currently not exercising    Monitoring:  Monitoring Assessed Today: Yes  Did patient bring glucose meter to appointment? : Yes  Blood Glucose Meter: CGM      Taking Medications:  Diabetes Medication(s)     Diabetic Other       glucose (BD GLUCOSE) 4 g chewable tablet    Take 1 tablet by mouth every hour as needed for low blood sugar     Patient not taking: Reported on 4/20/2022    Insulin       Insulin Aspart FlexPen 100 UNIT/ML SOPN    inject ONE TO FOUR units subcutaneous THREE TIMES DAILY with meals. ONE unit FOR SMALL meal. TWO units FOR larger meal plus sliding scale. max FOUR units with meal. OR UP TO 12 units PER DAY.     LANTUS SOLOSTAR 100 UNIT/ML soln    Inject 29 Units Subcutaneous every morning          Taking Medication Assessed Today: Yes  Current Treatments: Insulin Injections, Non-insulin Injectables  Problems taking diabetes medications regularly?: Yes  Diabetes medication side effects?: No    Problem Solving:  Problem Solving Assessed Today: No  Hypoglycemia Frequency: Rarely (not recently)  Patient carries a carbohydrate source: Yes      Reducing Risks:  Reducing Risks Assessed Today: No    Healthy Coping:  Healthy Coping Assessed Today: Yes  Emotional response to diabetes: Ready to learn  Informal Support system:: Friends, Family, Other  Stage of change: ACTION (Actively working towards change) (varies between preparation and action)  Support resources: Websites, In-person Offerings  Patient Activation Measure Survey Score:  EMY Score (Last Two) 5/10/2018   EMY Raw Score 35   Activation Score 72.1   EMY Level 3     Care Plan and Education Provided:  Care Plan: Diabetes   Updates made by Dolly Riley RD since 10/31/2022 12:00 AM      Problem: HbA1C Not In Goal       Goal: Establish Regular Follow-Ups with PCP       Task: Discuss with PCP the recommended timing for patient's next follow up visit(s)    Responsible  User: Dolly Riley RD      Task: Discuss schedule for PCP visits with patient Completed 10/31/2022   Responsible User: Dolly Riley RD      Goal: Get HbA1C Level in Goal       Task: Educate patient on diabetes education self-management topics Completed 10/31/2022   Responsible User: Dolly Riley RD      Task: Educate patient on benefits of regular glucose monitoring    Responsible User: Dolly Riley RD      Task: Refer patient to appropriate extended care team member, as needed (Medication Therapy Management, Behavioral Health, Physical Therapy, etc.)    Responsible User: Dolly Riley RD      Task: Discuss diabetes treatment plan with patient    Responsible User: Dolly Riley RD      Problem: Diabetes Self-Management Education Needed to Optimize Self-Care Behaviors       Goal: Understand diabetes pathophysiology and disease progression       Task: Provide education on diabetes pathophysiology and disease progression specfic to patient's diabetes type    Responsible User: Dolly Riley RD      Goal: Healthy Eating - follow a healthy eating pattern for diabetes       Task: Provide education on portion control and consistency in amount, composition and timing of food intake Completed 10/31/2022   Responsible User: Dolly Riley RD      Task: Provide education on managing carbohydrate intake (carbohydrate counting, plate planning method, etc.)    Responsible User: Dolly Riley RD      Task: Provide education on weight management    Responsible User: Dolly Riley RD      Task: Provide education on heart healthy eating    Responsible User: Dolly Riley RD      Task: Provide education on eating out    Responsible User: Dolly Riley RD      Task: Develop individualized healthy eating plan with patient    Responsible User: Dolly Riley RD      Goal: Being Active - get regular physical activity, working up to at least 150  minutes per week       Task: Provide education on relationship of activity to glucose and precautions to take if at risk for low glucose    Responsible User: Dolly Riley RD      Task: Discuss barriers to physical activity with patient    Responsible User: Dolly Riley RD      Task: Develop physical activity plan with patient    Responsible User: Dolly Riley RD      Task: Explore community resources including walking groups, assistance programs, and home videos    Responsible User: Dolly Riley RD      Goal: Monitoring - monitor glucose and ketones as directed       Task: Provide education on blood glucose monitoring (purpose, proper technique, frequency, glucose targets, interpreting results, when to use glucose control solution, sharps disposal) Completed 10/31/2022   Responsible User: Dolly Riley RD      Task: Provide education on continuous glucose monitoring (sensor placement, use of kwesi or /reader, understanding glucose trends, alerts and alarms, differences between sensor glucose and blood glucose) Completed 10/31/2022   Responsible User: Dolly Riley RD      Task: Provide education on ketone monitoring (when to monitor, frequency, etc.)    Responsible User: Dolly Riley RD      Goal: Taking Medication - patient is consistently taking medications as directed       Task: Provide education on action of prescribed medication, including when to take and possible side effects    Responsible User: Dolly Riley RD      Task: Provide education on insulin and injectable diabetes medications, including administration, storage, site selection and rotation for injection sites    Responsible User: Dolly Riley RD      Task: Discuss barriers to medication adherence with patient and provide management technique ideas as appropriate    Responsible User: Dolly Riley RD      Task: Provide education on frequency and refill details of  medications    Responsible User: Dolly Riley RD      Goal: Problem Solving - know how to prevent and manage short-term diabetes complications       Task: Provide education on high blood glucose - causes, signs/symptoms, prevention and treatment Completed 10/31/2022   Responsible User: Dolly Riley RD      Task: Provide education on low blood glucose - causes, signs/symptoms, prevention, treatment, carrying a carbohydrate source at all times, and medical identification Completed 10/31/2022   Responsible User: Dolly Riley RD      Task: Provide education on safe travel with diabetes    Responsible User: Dolly Riley RD      Task: Provide education on how to care for diabetes on sick days    Responsible User: Dolly Riley RD      Task: Provide education on when to call a health care provider    Responsible User: Dolly Riley RD      Goal: Reducing Risks - know how to prevent and treat long-term diabetes complications       Task: Provide education on major complications of diabetes, prevention, early diagnostic measures and treatment of complications    Responsible User: Dolly Riley RD      Task: Provide education on recommended care for dental, eye and foot health    Responsible User: Dolly Riley RD      Task: Provide education on Hemoglobin A1c - goals and relationship to blood glucose levels    Responsible User: Dolly Riley RD      Task: Provide education on recommendations for heart health - lipid levels and goals, blood pressure and goals, and aspirin therapy, if indicated    Responsible User: Dolly Rliey RD      Task: Provide education on tobacco cessation    Responsible User: Dolly Riley RD      Goal: Healthy Coping - use available resources to cope with the challenges of managing diabetes       Task: Discuss recognizing feelings about having diabetes    Responsible User: Dolly Riley RD      Task: Provide  education on the benefits of making appropriate lifestyle changes    Responsible User: Dolly Riley RD      Task: Provide education on benefits of utilizing support systems    Responsible User: Dolly Riley RD      Task: Discuss methods for coping with stress    Responsible User: Dolly Riley RD      Task: Provide education on when to seek professional counseling    Responsible User: Dolly Riley RD Elizabeth Swartout RD, LD, [facility]  Diabetes Education    Time Spent: 60 minutes  Encounter Type: Individual

## 2022-10-17 NOTE — TELEPHONE ENCOUNTER
Patient updated via email as planned    Dolly Riley RD, LD, [facility]  Diabetes Education

## 2022-10-17 NOTE — TELEPHONE ENCOUNTER
Routing to ordering provider for consideration, not on refill protocol.           Cece Braun     RN MSN

## 2022-10-18 DIAGNOSIS — K83.1 CHOLESTATIC LIVER DISEASE (H): Primary | ICD-10-CM

## 2022-10-19 ENCOUNTER — MYC MEDICAL ADVICE (OUTPATIENT)
Dept: FAMILY MEDICINE | Facility: CLINIC | Age: 37
End: 2022-10-19

## 2022-10-19 ENCOUNTER — OFFICE VISIT (OUTPATIENT)
Dept: FAMILY MEDICINE | Facility: CLINIC | Age: 37
End: 2022-10-19
Payer: COMMERCIAL

## 2022-10-19 VITALS
DIASTOLIC BLOOD PRESSURE: 58 MMHG | OXYGEN SATURATION: 98 % | HEIGHT: 64 IN | WEIGHT: 223.9 LBS | BODY MASS INDEX: 38.22 KG/M2 | RESPIRATION RATE: 16 BRPM | TEMPERATURE: 98.6 F | HEART RATE: 93 BPM | SYSTOLIC BLOOD PRESSURE: 132 MMHG

## 2022-10-19 DIAGNOSIS — L65.9 HAIR LOSS: ICD-10-CM

## 2022-10-19 DIAGNOSIS — G56.21 CUBITAL TUNNEL SYNDROME ON RIGHT: ICD-10-CM

## 2022-10-19 DIAGNOSIS — N18.31 TYPE 2 DIABETES MELLITUS WITH STAGE 3A CHRONIC KIDNEY DISEASE, WITH LONG-TERM CURRENT USE OF INSULIN (H): Primary | ICD-10-CM

## 2022-10-19 DIAGNOSIS — E11.42 TYPE 2 DIABETES MELLITUS WITH DIABETIC POLYNEUROPATHY, WITH LONG-TERM CURRENT USE OF INSULIN (H): ICD-10-CM

## 2022-10-19 DIAGNOSIS — Z79.4 TYPE 2 DIABETES MELLITUS WITH STAGE 3A CHRONIC KIDNEY DISEASE, WITH LONG-TERM CURRENT USE OF INSULIN (H): Primary | ICD-10-CM

## 2022-10-19 DIAGNOSIS — E11.22 TYPE 2 DIABETES MELLITUS WITH STAGE 3A CHRONIC KIDNEY DISEASE, WITH LONG-TERM CURRENT USE OF INSULIN (H): Primary | ICD-10-CM

## 2022-10-19 DIAGNOSIS — Z01.818 PREOP GENERAL PHYSICAL EXAM: Primary | ICD-10-CM

## 2022-10-19 DIAGNOSIS — Z79.4 TYPE 2 DIABETES MELLITUS WITH DIABETIC POLYNEUROPATHY, WITH LONG-TERM CURRENT USE OF INSULIN (H): ICD-10-CM

## 2022-10-19 DIAGNOSIS — I10 ESSENTIAL HYPERTENSION: Chronic | ICD-10-CM

## 2022-10-19 DIAGNOSIS — Z23 HIGH PRIORITY FOR 2019-NCOV VACCINE: ICD-10-CM

## 2022-10-19 LAB
ALBUMIN SERPL BCG-MCNC: 3.7 G/DL (ref 3.5–5.2)
ALP SERPL-CCNC: 178 U/L (ref 35–104)
ALT SERPL W P-5'-P-CCNC: 61 U/L (ref 10–35)
ANION GAP SERPL CALCULATED.3IONS-SCNC: 13 MMOL/L (ref 7–15)
AST SERPL W P-5'-P-CCNC: 34 U/L (ref 10–35)
BILIRUB DIRECT SERPL-MCNC: <0.2 MG/DL (ref 0–0.3)
BILIRUB SERPL-MCNC: 0.2 MG/DL
BUN SERPL-MCNC: 25.7 MG/DL (ref 6–20)
CALCIUM SERPL-MCNC: 9.1 MG/DL (ref 8.6–10)
CHLORIDE SERPL-SCNC: 104 MMOL/L (ref 98–107)
CREAT SERPL-MCNC: 1.37 MG/DL (ref 0.51–0.95)
DEPRECATED HCO3 PLAS-SCNC: 19 MMOL/L (ref 22–29)
ERYTHROCYTE [DISTWIDTH] IN BLOOD BY AUTOMATED COUNT: 14.9 % (ref 10–15)
GFR SERPL CREATININE-BSD FRML MDRD: 51 ML/MIN/1.73M2
GLUCOSE SERPL-MCNC: 319 MG/DL (ref 70–99)
HBA1C MFR BLD: 9.6 % (ref 0–5.6)
HCT VFR BLD AUTO: 36.7 % (ref 35–47)
HGB BLD-MCNC: 11.4 G/DL (ref 11.7–15.7)
INR PPP: 0.87 (ref 0.85–1.15)
MCH RBC QN AUTO: 30.3 PG (ref 26.5–33)
MCHC RBC AUTO-ENTMCNC: 31.1 G/DL (ref 31.5–36.5)
MCV RBC AUTO: 98 FL (ref 78–100)
PLATELET # BLD AUTO: 375 10E3/UL (ref 150–450)
POTASSIUM SERPL-SCNC: 5.9 MMOL/L (ref 3.4–5.3)
PROT SERPL-MCNC: 7.1 G/DL (ref 6.4–8.3)
RBC # BLD AUTO: 3.76 10E6/UL (ref 3.8–5.2)
SODIUM SERPL-SCNC: 136 MMOL/L (ref 136–145)
WBC # BLD AUTO: 15.7 10E3/UL (ref 4–11)

## 2022-10-19 PROCEDURE — 99214 OFFICE O/P EST MOD 30 MIN: CPT | Mod: 25 | Performed by: NURSE PRACTITIONER

## 2022-10-19 PROCEDURE — 36415 COLL VENOUS BLD VENIPUNCTURE: CPT | Performed by: NURSE PRACTITIONER

## 2022-10-19 PROCEDURE — 80053 COMPREHEN METABOLIC PANEL: CPT | Performed by: NURSE PRACTITIONER

## 2022-10-19 PROCEDURE — 85027 COMPLETE CBC AUTOMATED: CPT | Performed by: NURSE PRACTITIONER

## 2022-10-19 PROCEDURE — 82248 BILIRUBIN DIRECT: CPT | Performed by: NURSE PRACTITIONER

## 2022-10-19 PROCEDURE — 85610 PROTHROMBIN TIME: CPT | Performed by: NURSE PRACTITIONER

## 2022-10-19 PROCEDURE — 91313 COVID-19,PF,MODERNA BIVALENT: CPT | Performed by: NURSE PRACTITIONER

## 2022-10-19 PROCEDURE — 83036 HEMOGLOBIN GLYCOSYLATED A1C: CPT | Performed by: NURSE PRACTITIONER

## 2022-10-19 PROCEDURE — 90686 IIV4 VACC NO PRSV 0.5 ML IM: CPT | Performed by: NURSE PRACTITIONER

## 2022-10-19 PROCEDURE — G0008 ADMIN INFLUENZA VIRUS VAC: HCPCS | Mod: 59 | Performed by: NURSE PRACTITIONER

## 2022-10-19 PROCEDURE — 0134A COVID-19,PF,MODERNA BIVALENT: CPT | Performed by: NURSE PRACTITIONER

## 2022-10-19 RX ORDER — CARVEDILOL 6.25 MG/1
6.25 TABLET ORAL 2 TIMES DAILY
Qty: 60 TABLET | Refills: 3 | Status: SHIPPED | OUTPATIENT
Start: 2022-10-19 | End: 2022-12-19

## 2022-10-19 RX ORDER — BIOTIN 5 MG
1 TABLET ORAL DAILY
Qty: 60 TABLET | Refills: 1 | Status: SHIPPED | OUTPATIENT
Start: 2022-10-19 | End: 2023-01-11

## 2022-10-19 ASSESSMENT — ASTHMA QUESTIONNAIRES
QUESTION_2 LAST FOUR WEEKS HOW OFTEN HAVE YOU HAD SHORTNESS OF BREATH: NOT AT ALL
QUESTION_4 LAST FOUR WEEKS HOW OFTEN HAVE YOU USED YOUR RESCUE INHALER OR NEBULIZER MEDICATION (SUCH AS ALBUTEROL): NOT AT ALL
ACT_TOTALSCORE: 25
ACT_TOTALSCORE: 25
QUESTION_5 LAST FOUR WEEKS HOW WOULD YOU RATE YOUR ASTHMA CONTROL: COMPLETELY CONTROLLED
QUESTION_3 LAST FOUR WEEKS HOW OFTEN DID YOUR ASTHMA SYMPTOMS (WHEEZING, COUGHING, SHORTNESS OF BREATH, CHEST TIGHTNESS OR PAIN) WAKE YOU UP AT NIGHT OR EARLIER THAN USUAL IN THE MORNING: NOT AT ALL
QUESTION_1 LAST FOUR WEEKS HOW MUCH OF THE TIME DID YOUR ASTHMA KEEP YOU FROM GETTING AS MUCH DONE AT WORK, SCHOOL OR AT HOME: NONE OF THE TIME

## 2022-10-19 ASSESSMENT — PAIN SCALES - GENERAL: PAINLEVEL: NO PAIN (0)

## 2022-10-19 NOTE — Clinical Note
Ryan Saab,  I could not clear Divina for her surgery, A1C is 9.6%, she did not make any changes since last time she talked to you, she is still using Lantus at 29 units and not eating healthy, she went to [employer] and got apple pie and ate half of the pie herself. I told her that she needs to work with you and follow diabetic diet more closely, her glucose readings should be less then 180 on average for 2 weeks before surgery, or A1C down to at least 8.5%

## 2022-10-19 NOTE — PATIENT INSTRUCTIONS
Lab Results   Component Value Date    A1C 9.2 06/17/2022    A1C 8.2 01/12/2022    A1C 7.8 10/28/2021    A1C 7.4 07/08/2021    A1C 8.0 03/26/2021    A1C 9.0 08/14/2020    A1C 7.8 05/18/2020    A1C 7.4 02/17/2020      Reduce Lantus to 23-24 units at bedtime on the day before surgery    If you split Lantus to twice daily, or 15 units twice daily, you will need to reduce Lantus at bedtime to 12 units     STOP OZEMPIC

## 2022-10-19 NOTE — PROGRESS NOTES
Answers for HPI/ROS submitted by the patient on 10/19/2022  What is the reason for your visit today? : pre op and medication review  How many servings of fruits and vegetables do you eat daily?: 0-1  On average, how many sweetened beverages do you drink each day (Examples: soda, juice, sweet tea, etc.  Do NOT count diet or artificially sweetened beverages)?: 2  How many minutes a day do you exercise enough to make your heart beat faster?: 9 or less  How many days a week do you exercise enough to make your heart beat faster?: 3 or less  How many days per week do you miss taking your medication?: 0    [facility]  [address] [address]  Phone: [phone]  Primary Provider: Franky Velasquez  Pre-op Performing Provider: FRANKY VELASQUEZ  [id number]}  PREOPERATIVE EVALUATION:  Today's date: 10/19/2022    Divina Delgadillo is a 37 year old female who presents for a preoperative evaluation.    Surgical Information:  Surgery/Procedure: right arm surgery   Surgery Location: [facility]   Surgeon: DR Solis   Surgery Date: 11/2/22  Time of Surgery: TBD   Where patient plans to recover: At home with family  Fax number for surgical facility: [phone]     Type of Anesthesia Anticipated: to be determined    Assessment & Plan     The proposed surgical procedure is considered INTERMEDIATE risk.    Preop general physical exam    - Basic metabolic panel  (Ca, Cl, CO2, Creat, Gluc, K, Na, BUN); Future  - CBC with platelets; Future  - CBC with platelets    Cubital tunnel syndrome on right  _ I told Divina that I can not clear her for surgery at this time due to poorly controlled diabetes. I discussed risk factors and possible complications after surgery including higher risk of infection due to hyperglycemia. Divina states her numbers fluctuate wide from 60 to 300', but she states she is noticing high numbers only after she eats high carb meals. She is not following  diabetic diet very well, she tells me that she went to [employer] this weekend and got apple pie and eat half of it. She is currently following diabetic educator Katiana, who is trying to help Divina and recently adjusted Novolog sliding scale insulin and recommended to reduce Lantus to 26 units to prevent low BG's, Divina tells me that she is till doing 29 units. I advised Divina to work with Katiana on improvement of her glucose readings, but unfortunately she needs to reschedule her surgery     - CBC with platelets; Future  - CBC with platelets    Type 2 diabetes mellitus with diabetic polyneuropathy, with long-term current use of insulin (H)  -uncontrolled   - Hemoglobin A1c; Future  - Basic metabolic panel  (Ca, Cl, CO2, Creat, Gluc, K, Na, BUN); Future  - Hemoglobin A1c    High priority for 2019-nCoV vaccine    - COVID-19,PF,MODERNA BIVALENT 18+Yrs    Hair loss    - Biotin (BIOTIN FORTE) 5 MG TABS; Take 1 tablet by mouth daily    Essential hypertension  -well controlled   - carvedilol (COREG) 6.25 MG tablet; Take 1 tablet (6.25 mg) by mouth 2 times daily         Risks and Recommendations:  The patient has the following additional risks and recommendations for perioperative complications:  Diabetes:  - Patient is on insulin therapy; diabetic NPO guidelines provided and discussed.    Medication Instructions:   - Long acting insulin (e.g. glargine, detemir): Take 80% of the usual evening or morning dose before surgery.          - short acting insulin (e.g. regular, lispro, aspart): HOLD on the morning of surgery.     RECOMMENDATION:    Surgery is NOT recommended due to uncontrolled diabetes. Stabilization required prior to elective surgery.     [id number]}    Subjective     HPI related to upcoming procedure: right hand surgery for treatment of cubital tunnel neuropathy     Preop Questions 10/19/2022   1. Have you ever had a heart attack or stroke? No   2. Have you ever had surgery on your heart or blood vessels, such as a  stent placement, a coronary artery bypass, or surgery on an artery in your head, neck, heart, or legs? No   3. Do you have chest pain with activity? No   4. Do you have a history of  heart failure? No   5. Do you currently have a cold, bronchitis or symptoms of other infection? No   6. Do you have a cough, shortness of breath, or wheezing? No   7. Do you or anyone in your family have previous history of blood clots? No   8. Do you or does anyone in your family have a serious bleeding problem such as prolonged bleeding following surgeries or cuts? No   9. Have you ever had problems with anemia or been told to take iron pills? No   10. Have you had any abnormal blood loss such as black, tarry or bloody stools, or abnormal vaginal bleeding? No   11. Have you ever had a blood transfusion? No   12. Are you willing to have a blood transfusion if it is medically needed before, during, or after your surgery? Yes   13. Have you or any of your relatives ever had problems with anesthesia? No   14. Do you have sleep apnea, excessive snoring or daytime drowsiness? No   15. Do you have any artifical heart valves or other implanted medical devices like a pacemaker, defibrillator, or continuous glucose monitor? No   16. Do you have artificial joints? No   17. Are you allergic to latex? YES   18. Is there any chance that you may be pregnant? No       Health Care Directive:  Patient has a Health Care Directive on file      Preoperative Review of :   reviewed - no record of controlled substances prescribed.      Status of Chronic Conditions:  DIABETES - Patient has a longstanding history of DiabetesType Type II . Patient is being treated with insulin injections and denies significant side effects. Control has been poor. Complicating factors include but are not limited to: hypertension, hyperlipidemia, chronic kidney disease and morbid obesity .     HYPERTENSION - Patient has longstanding history of HTN , currently denies any  "symptoms referable to elevated blood pressure. Specifically denies chest pain, palpitations, dyspnea, orthopnea, PND or peripheral edema. Blood pressure readings have been in normal range. Current medication regimen is as listed below. Patient denies any side effects of medication.     RENAL INSUFFICIENCY - Patient has a longstanding history of moderate-severe chronic renal insufficiency. Last Cr 1.37      Review of Systems  Constitutional, neuro, ENT, endocrine, pulmonary, cardiac, gastrointestinal, genitourinary, musculoskeletal, integument and psychiatric systems are negative, except as otherwise noted.    Patient Active Problem List    Diagnosis Date Noted     Ulnar neuropathy of both upper extremities 09/20/2022     Priority: Medium     Insomnia, unspecified type 08/30/2021     Priority: Medium     Type 2 diabetes mellitus (H) 01/23/2021     Priority: Medium     History of DVT of arm  (deep vein thrombosis) 01/23/2021     Priority: Medium     ultrasound 1/6/2017-  venous thrombus in the antecubital fossa region and the left forearm as described       Asthma 05/26/2020     Priority: Medium     Schizoaffective disorder, depressive type (H) 07/19/2019     Priority: Medium     Acquired asplenia 03/22/2019     Priority: Medium     Chronic leukocytosis 11/27/2018     Priority: Medium     Due to spleen removal       Nicotine abuse 08/13/2018     Priority: Medium     Uncontrolled type 2 diabetes mellitus with diabetic polyneuropathy, with long-term current use of insulin 01/24/2018     Priority: Medium     Mild intermittent asthma with acute exacerbation 01/16/2018     Priority: Medium     Morbid obesity (H) 01/09/2018     Priority: Medium     IUD (intrauterine device) in place 07/26/2017     Priority: Medium     Mirena IUD inserted in office with premed 7/26/2017         Cervical high risk HPV (human papillomavirus) test positive 06/20/2017     Priority: Medium     6/15/2017 Pap:NIL, +HR HPV \"other\" (not 16/18). Plan " to repeat Pap+HPV in 1 year  6/14/2018 Pap: NIL/neg HPV. Plan Pap+HPV in 3 years (2021)  3/26/21 NIL pap, Neg HPV. Plan cotest in 3 years.        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, splenectomy, left adrenalectomy 02/26/2015;       Type 2 diabetes mellitus with stage 3 chronic kidney disease, with long-term current use of insulin (H) 02/27/2017     Priority: Medium     Bipolar disorder (H) 02/27/2017     Priority: Medium     Orthostatic hypotension 01/10/2017     Priority: Medium     Tobacco abuse 01/10/2017     Priority: Medium     Long term (current) use of anticoagulants 01/09/2017     Priority: Medium     Stage 3 chronic kidney disease 12/03/2015     Priority: Medium     Overview:   Updated per 10/1/17 IMO import       Vitamin D insufficiency 06/01/2015     Priority: Medium     Cardiac murmur 08/20/2013     Priority: Medium     Depressive disorder 09/15/2012     Priority: Medium     Hyperlipidemia LDL goal <100 10/31/2010     Priority: Medium     Borderline personality disorder (H) 11/06/2009     Priority: Medium     Essential hypertension 06/13/2008     Priority: Medium     NAFL (nonalcoholic fatty liver) 04/11/2006     Priority: Medium     See flowsheet for hepatic panel.   Stopped zocor, was on godon as well had right upper quandrant ultrasound and ct  ? Psych med related vs [facility]       Esophageal reflux 04/14/2005     Priority: Medium     GERD       PTSD (post-traumatic stress disorder) 01/01/2001     Priority: Medium      Past Medical History:   Diagnosis Date     Acute pain of left knee 03/22/2019     Acute-on-chronic kidney injury (H) 03/22/2019     ARF (acute renal failure) (H) 03/23/2019     Cervical high risk HPV (human papillomavirus) test positive 06/20/2017     Deep venous thrombosis of arm (H) 01/06/2017    ultrasound 1/6/2017-  venous thrombus in the antecubital fossa region and the  left forearm as described     Depressive disorder      Depressive disorder, not elsewhere classified      DVT (deep venous thrombosis) (H)      Dysmetabolic syndrome X      Esophageal reflux     GERD     Hypertension 2014     Mild intermittent asthma      Near syncope 03/22/2019     Other specified types of schizophrenia, unspecified condition      Pancreatic cancer (H)     left adrenal gland, partial pancreas, and spleen removed; no chemo or radiation.      Suicidal behavior 05/26/2020     Type II or unspecified type diabetes mellitus without mention of complication, not stated as uncontrolled      Past Surgical History:   Procedure Laterality Date     ADRENALECTOMY Left 2015     BIOPSY       BREAST SURGERY  2013     COLONOSCOPY       ENT SURGERY  10/01/2000    Tympanoplasty     IR LIVER BIOPSY PERCUTANEOUS  11/14/2019     PANCREATECTOMY PARTIAL  2015     PERCUTANEOUS BIOPSY LIVER Right 11/14/2019    Procedure: Percutaneous Liver Biopsy;  Surgeon: Sean Guzman PA-C;  Location: [facility] OR     SPLENECTOMY  2015     TONSILLECTOMY  10/01/2000    Tonsillectomy     Current Outpatient Medications   Medication Sig Dispense Refill     ACCU-CHEK GUIDE test strip Use to test blood sugar 3 times daily or as directed. 150 strip 1     amLODIPine (NORVASC) 5 MG tablet Take 1 tablet (5 mg) by mouth daily (with dinner) 90 tablet 1     ARIPiprazole (ABILIFY) 30 MG tablet Take 30 mg by mouth daily       atorvastatin (LIPITOR) 10 MG tablet Take 1 tablet (10 mg) by mouth daily 90 tablet 3     Biotin (BIOTIN FORTE) 5 MG TABS Take 1 tablet by mouth daily 60 tablet 1     blood glucose (ACCU-CHEK GUIDE) test strip TEST BLOOD SUGAR THREE TIMES DAILY 100 strip 1     blood glucose monitoring (ACCU-CHEK CARLITOS PLUS) meter device kit Use to test blood sugar three times daily or as directed. 1 kit 0     blood glucose monitoring (ACCU-CHEK FASTCLIX) lancets Use to test blood sugar 3 times daily 102 each 3     blood glucose monitoring (NO  BRAND SPECIFIED) meter device kit Use to test blood sugar 3 times daily or as directed.  Accu Chek Guide Meter. 1 kit 1     carvedilol (COREG) 6.25 MG tablet Take 1 tablet (6.25 mg) by mouth 2 times daily 60 tablet 3     cloZAPine (CLOZARIL) 100 MG tablet Take 100 mg by mouth 2 times daily        Continuous Blood Gluc Sensor (DEXCOM G6 SENSOR) MISC 1 each every 10 days. Change every 10 days. USE TO READ BLOOD SUGARS PER  INSTRUCTIONS 9 each 1     Continuous Blood Gluc Transmit (DEXCOM G6 TRANSMITTER) MISC 1 each every 3 months Change every 3 months. USE TO READ BLOOD SUGARS PER  INSTRUCTIONS 1 each 1     FLUoxetine (PROZAC) 40 MG capsule Take 40 mg by mouth daily       Insulin Aspart FlexPen 100 UNIT/ML SOPN inject ONE TO FOUR units subcutaneous THREE TIMES DAILY with meals. ONE unit FOR SMALL meal. TWO units FOR larger meal plus sliding scale. max FOUR units with meal. OR UP TO 12 units PER DAY. 15 mL 0     insulin pen needle (32G X 6 MM) 32G X 6 MM miscellaneous Use 4 pen needles daily. 150 each 10     insulin pen needle (ULTICARE MINI) 31G X 6 MM miscellaneous USE 1 PEN NEEDLE DAILY 100 each 10     lamoTRIgine (LAMICTAL) 100 MG tablet Take 100 mg by mouth At Bedtime       LANTUS SOLOSTAR 100 UNIT/ML soln Inject 29 Units Subcutaneous every morning 15 mL 1     lisinopril (ZESTRIL) 5 MG tablet Take 1 tablet (5 mg) by mouth daily 90 tablet 3     loperamide (IMODIUM A-D) 2 MG tablet 2 caplets after loose stool then 1 after each loose stool do not exceed 4 in 24hrs       loratadine (CLARITIN) 10 MG tablet TAKE ONE TABLET BY MOUTH EVERY MORNING 28 tablet 10     montelukast (SINGULAIR) 10 MG tablet TAKE ONE TABLET BY MOUTH AT BEDTIME 28 tablet 0     omeprazole (PRILOSEC) 20 MG DR capsule Take 1 capsule (20 mg) by mouth daily 28 capsule 10     ondansetron (ZOFRAN ODT) 4 MG ODT tab Take 1-2 tablets (4-8 mg) by mouth every 6 hours as needed for nausea 10 tablet 0     QUEtiapine (SEROQUEL) 25 MG  tablet Take 25 mg by mouth 1 tablet 4 times daily as needed for agitation or hallucinations       acetaminophen (TYLENOL) 325 MG tablet Take 325-650 mg by mouth 2 tab every 4-6 hours as needed, do not exceed 9 tabs in 24hours  (Patient not taking: No sig reported)       albuterol (VENTOLIN HFA) 108 (90 Base) MCG/ACT inhaler INHALE 2 PUFFS INTO THE LUNGS EVERY SIX  HOURS AS NEEDED FOR SHORTNESS OF BREATH (Patient not taking: Reported on 10/19/2022) 18 g 10     bisacodyl (DULCOLAX) 10 MG suppository Place 10 mg rectally  (Patient not taking: No sig reported)       calcium carbonate (TUMS) 500 MG chewable tablet Take 1 chew tab by mouth 2 times daily (Patient not taking: Reported on 10/19/2022)       COMPOUNDED NON-CONTROLLED SUBSTANCE (CMPD RX) - PHARMACY TO MIX COMPOUNDED MEDICATION Chloramphenicol 50 mg, sulfamethoxazole 50 mg, amphotericin B 5 mg, hydrocortisone 1 mg. 2-3 puffs to affected ear PRN itching/drainage (Patient not taking: No sig reported) 30 capsule 1     fluocinolone acetonide 0.01 % OIL Place 4 drops in ear(s) 2 times daily as needed (ear itching) (Patient not taking: No sig reported) 20 mL 3     glucose (BD GLUCOSE) 4 g chewable tablet Take 1 tablet by mouth every hour as needed for low blood sugar (Patient not taking: No sig reported) 30 tablet 4     magnesium citrate solution Take 296 mLs by mouth once (Patient not taking: No sig reported)       nicotine (NICODERM CQ) 7 MG/24HR 24 hr patch Place 1 patch onto the skin every 24 hours (Patient not taking: Reported on 10/19/2022) 30 patch 0     nystatin (MYCOSTATIN) 821430 UNIT/GM external ointment Apply liberally to corners of mouth 3x daily until seen (Patient not taking: Reported on 10/19/2022)       NYSTATIN PO Oral suspension 4-6ml rinse for 2 minutes then swallow 2 x daily (Patient not taking: No sig reported)       polyethylene glycol (MIRALAX) 17 GM/Dose powder Take 17 g by mouth as needed (Patient not taking: Reported on 10/19/2022)        "pramox-pe-glycerin-petrolatum (PREPARATION H) 1-0.25-14.4-15 % CREA cream  (Patient not taking: No sig reported)       traZODone (DESYREL) 50 MG tablet Take 50 mg by mouth nightly as needed  (Patient not taking: Reported on 10/19/2022)         Allergies   Allergen Reactions     Acetaminophen Other (See Comments)     pt previously tried to overdose on it, PMD does not want pt taking per pt.      Latex Rash        Social History     Tobacco Use     Smoking status: Passive Smoke Exposure - Never Smoker     Smokeless tobacco: Current   Substance Use Topics     Alcohol use: No       History   Drug Use No         Objective     /58 (BP Location: Left arm, Patient Position: Sitting, Cuff Size: Adult Large)   Pulse 93   Temp 98.6  F (37  C) (Tympanic)   Resp 16   Ht 1.626 m (5' 4\")   Wt 101.6 kg (223 lb 14.4 oz)   SpO2 98%   BMI 38.43 kg/m      Physical Exam    GENERAL APPEARANCE: healthy, alert and no distress     EYES: EOMI, PERRL     HENT: ear canals and TM's normal and nose and mouth without ulcers or lesions     NECK: no adenopathy, no asymmetry, masses, or scars and thyroid normal to palpation     RESP: lungs clear to auscultation - no rales, rhonchi or wheezes     CV: regular rates and rhythm, normal S1 S2, no S3 or S4 and no murmur, click or rub     MS: extremities normal- no gross deformities noted, no evidence of inflammation in joints, FROM in all extremities.     SKIN: no suspicious lesions or rashes     NEURO: Normal strength and tone, sensory exam grossly normal, mentation intact and speech normal     PSYCH: mentation appears normal. and affect normal/bright     LYMPHATICS: No cervical adenopathy    Recent Labs   Lab Test 09/28/22  1413 09/07/22  1057 07/11/22  1401 07/06/22  1406 06/23/22  1946 06/17/22  1649 06/17/22  1435 02/02/22  0932 01/12/22  1505 08/09/21  1316 07/08/21  1605   HGB 11.9 12.2   < > 12.1  --    < >  --    < >  --    < > 12.9   * 446   < > 384  --    < >  --    < >  --  "   < > 396   INR  --   --   --   --   --   --   --   --   --   --  0.91   NA  --   --   --  140 142   < > 144  --  141   < > 139   POTASSIUM  --   --   --  4.4 4.7   < > 4.5  --  4.6   < > 4.4   CR  --   --   --  1.06* 1.11*   < > 1.18*  --  1.36*   < > 1.32*   A1C  --   --   --   --   --   --  9.2*  --  8.2*   < > 7.4*    < > = values in this interval not displayed.        Diagnostics:  Recent Results (from the past 24 hour(s))   Hepatic function panel    Collection Time: 10/19/22  7:39 AM   Result Value Ref Range    Protein Total 7.1 6.4 - 8.3 g/dL    Albumin 3.7 3.5 - 5.2 g/dL    Bilirubin Total 0.2 <=1.2 mg/dL    Alkaline Phosphatase 178 (H) 35 - 104 U/L    AST 34 10 - 35 U/L    ALT 61 (H) 10 - 35 U/L    Bilirubin Direct <0.20 0.00 - 0.30 mg/dL   INR    Collection Time: 10/19/22  7:39 AM   Result Value Ref Range    INR 0.87 0.85 - 1.15   Basic metabolic panel    Collection Time: 10/19/22  7:39 AM   Result Value Ref Range    Sodium 136 136 - 145 mmol/L    Potassium 5.9 (H) 3.4 - 5.3 mmol/L    Chloride 104 98 - 107 mmol/L    Carbon Dioxide (CO2) 19 (L) 22 - 29 mmol/L    Anion Gap 13 7 - 15 mmol/L    Urea Nitrogen 25.7 (H) 6.0 - 20.0 mg/dL    Creatinine 1.37 (H) 0.51 - 0.95 mg/dL    Calcium 9.1 8.6 - 10.0 mg/dL    Glucose 319 (H) 70 - 99 mg/dL    GFR Estimate 51 (L) >60 mL/min/1.73m2   Hemoglobin A1c    Collection Time: 10/19/22  7:39 AM   Result Value Ref Range    Hemoglobin A1C 9.6 (H) 0.0 - 5.6 %   CBC with platelets    Collection Time: 10/19/22  7:39 AM   Result Value Ref Range    WBC Count 15.7 (H) 4.0 - 11.0 10e3/uL    RBC Count 3.76 (L) 3.80 - 5.20 10e6/uL    Hemoglobin 11.4 (L) 11.7 - 15.7 g/dL    Hematocrit 36.7 35.0 - 47.0 %    MCV 98 78 - 100 fL    MCH 30.3 26.5 - 33.0 pg    MCHC 31.1 (L) 31.5 - 36.5 g/dL    RDW 14.9 10.0 - 15.0 %    Platelet Count 375 150 - 450 10e3/uL      No EKG required, no history of coronary heart disease, significant arrhythmia, peripheral arterial disease or other structural heart  disease.    Revised Cardiac Risk Index (RCRI):  The patient has the following serious cardiovascular risks for perioperative complications:   - Diabetes Mellitus (on Insulin) = 1 point     RCRI Interpretation: 1 point: Class II (low risk - 0.9% complication rate)           Signed Electronically by: VERONIQUE Spears CNP  Copy of this evaluation report is provided to requesting physician.

## 2022-10-23 ENCOUNTER — HEALTH MAINTENANCE LETTER (OUTPATIENT)
Age: 37
End: 2022-10-23

## 2022-10-25 ENCOUNTER — OFFICE VISIT (OUTPATIENT)
Dept: GASTROENTEROLOGY | Facility: CLINIC | Age: 37
End: 2022-10-25
Attending: INTERNAL MEDICINE
Payer: COMMERCIAL

## 2022-10-25 ENCOUNTER — ALLIED HEALTH/NURSE VISIT (OUTPATIENT)
Dept: EDUCATION SERVICES | Facility: CLINIC | Age: 37
End: 2022-10-25
Payer: COMMERCIAL

## 2022-10-25 VITALS
HEIGHT: 64 IN | HEART RATE: 88 BPM | DIASTOLIC BLOOD PRESSURE: 70 MMHG | SYSTOLIC BLOOD PRESSURE: 126 MMHG | BODY MASS INDEX: 38.04 KG/M2 | WEIGHT: 222.8 LBS

## 2022-10-25 DIAGNOSIS — E11.42 TYPE 2 DIABETES MELLITUS WITH DIABETIC POLYNEUROPATHY (H): ICD-10-CM

## 2022-10-25 DIAGNOSIS — E11.9 TYPE 2 DIABETES MELLITUS (H): Primary | ICD-10-CM

## 2022-10-25 DIAGNOSIS — E66.01 MORBID OBESITY (H): Primary | Chronic | ICD-10-CM

## 2022-10-25 DIAGNOSIS — K76.0 NAFL (NONALCOHOLIC FATTY LIVER): Chronic | ICD-10-CM

## 2022-10-25 DIAGNOSIS — K83.8 VANISHING BILE DUCT SYNDROME: ICD-10-CM

## 2022-10-25 PROCEDURE — G0463 HOSPITAL OUTPT CLINIC VISIT: HCPCS

## 2022-10-25 PROCEDURE — G0108 DIAB MANAGE TRN  PER INDIV: HCPCS | Performed by: DIETITIAN, REGISTERED

## 2022-10-25 PROCEDURE — 99213 OFFICE O/P EST LOW 20 MIN: CPT | Performed by: INTERNAL MEDICINE

## 2022-10-25 ASSESSMENT — PAIN SCALES - GENERAL: PAINLEVEL: NO PAIN (0)

## 2022-10-25 NOTE — PROGRESS NOTES
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    Since last seen in clinic she reports that she has been doing very well.  Her mental health has been in very good condition.  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  She is very happy that she been able to be independent and is looking for work closer to home.  She is been working closely with her psychiatrist with regard to her medications and does actually report that she has been weaning down on her Clozapine dose.  She is attempted to wean off this in the past with decompensation of her mental health, but reports she is on the third of the dose she was previous without overt changes.  She does report that she has put on significant weight but joined her local gym and she is excited to reengage.    We had long discussions today about lifestyle modifications including diet and exercise    Past Medical History:  Past Medical History:   Diagnosis Date     Acute pain of left knee 03/22/2019     Acute-on-chronic kidney injury (H) 03/22/2019     ARF (acute renal failure) (H) 03/23/2019     Cervical high risk HPV (human papillomavirus) test positive 06/20/2017     Deep venous thrombosis of arm (H) 01/06/2017    ultrasound 1/6/2017-  venous thrombus in the antecubital fossa region and the left forearm as described     Depressive disorder      Depressive disorder, not elsewhere classified      DVT (deep venous  thrombosis) (H)      Dysmetabolic syndrome X      Esophageal reflux     GERD     Hypertension 2014     Mild intermittent asthma      Near syncope 03/22/2019     Other specified types of schizophrenia, unspecified condition      Pancreatic cancer (H)     left adrenal gland, partial pancreas, and spleen removed; no chemo or radiation.      Suicidal behavior 05/26/2020     Type II or unspecified type diabetes mellitus without mention of complication, not stated as uncontrolled        Surgical History:  Past Surgical History:   Procedure Laterality Date     ADRENALECTOMY Left 2015     BIOPSY       BREAST SURGERY  2013     COLONOSCOPY       ENT SURGERY  10/01/2000    Tympanoplasty     IR LIVER BIOPSY PERCUTANEOUS  11/14/2019     PANCREATECTOMY PARTIAL  2015     PERCUTANEOUS BIOPSY LIVER Right 11/14/2019    Procedure: Percutaneous Liver Biopsy;  Surgeon: Sean Guzman PA-C;  Location: [facility] OR     SPLENECTOMY  2015     TONSILLECTOMY  10/01/2000    Tonsillectomy       Social History:  Social History     Tobacco Use     Smoking status: Never     Passive exposure: Yes     Smokeless tobacco: Current   Vaping Use     Vaping Use: Former   Substance Use Topics     Alcohol use: No     Drug use: No        Family History:  Family History   Problem Relation Age of Onset     Respiratory Mother         ASTHMA     Allergies Mother      Depression Mother      Chronic Obstructive Pulmonary Disease Mother      Anxiety Disorder Mother      Mental Illness Mother      Asthma Mother      Heart Disease Father         HEART VALVE REPLACEMENT     Hypertension Father      Diabetes Father      Depression Father      Anxiety Disorder Father      Mental Illness Father      Obesity Father      Respiratory Sister      Allergies Sister      Depression Sister      Respiratory Sister      Allergies Sister      Depression Sister      Respiratory Brother      Allergies Brother      Kidney Disease No family hx of        Medications:  Current  Outpatient Medications   Medication     ACCU-CHEK GUIDE test strip     amLODIPine (NORVASC) 5 MG tablet     ARIPiprazole (ABILIFY) 30 MG tablet     atorvastatin (LIPITOR) 10 MG tablet     Biotin (BIOTIN FORTE) 5 MG TABS     blood glucose (ACCU-CHEK GUIDE) test strip     blood glucose monitoring (ACCU-CHEK CARLITOS PLUS) meter device kit     blood glucose monitoring (ACCU-CHEK FASTCLIX) lancets     blood glucose monitoring (NO BRAND SPECIFIED) meter device kit     carvedilol (COREG) 6.25 MG tablet     cloZAPine (CLOZARIL) 100 MG tablet     Continuous Blood Gluc Sensor (DEXCOM G6 SENSOR) MISC     Continuous Blood Gluc Transmit (DEXCOM G6 TRANSMITTER) MISC     FLUoxetine (PROZAC) 40 MG capsule     Insulin Aspart FlexPen 100 UNIT/ML SOPN     insulin pen needle (32G X 6 MM) 32G X 6 MM miscellaneous     insulin pen needle (ULTICARE MINI) 31G X 6 MM miscellaneous     lamoTRIgine (LAMICTAL) 100 MG tablet     LANTUS SOLOSTAR 100 UNIT/ML soln     lisinopril (ZESTRIL) 5 MG tablet     loperamide (IMODIUM A-D) 2 MG tablet     loratadine (CLARITIN) 10 MG tablet     montelukast (SINGULAIR) 10 MG tablet     omeprazole (PRILOSEC) 20 MG DR capsule     ondansetron (ZOFRAN ODT) 4 MG ODT tab     QUEtiapine (SEROQUEL) 25 MG tablet     acetaminophen (TYLENOL) 325 MG tablet     albuterol (VENTOLIN HFA) 108 (90 Base) MCG/ACT inhaler     bisacodyl (DULCOLAX) 10 MG suppository     calcium carbonate (TUMS) 500 MG chewable tablet     COMPOUNDED NON-CONTROLLED SUBSTANCE (CMPD RX) - PHARMACY TO MIX COMPOUNDED MEDICATION     fluocinolone acetonide 0.01 % OIL     glucose (BD GLUCOSE) 4 g chewable tablet     magnesium citrate solution     nicotine (NICODERM CQ) 7 MG/24HR 24 hr patch     nystatin (MYCOSTATIN) 140927 UNIT/GM external ointment     NYSTATIN PO     polyethylene glycol (MIRALAX) 17 GM/Dose powder     pramox-pe-glycerin-petrolatum (PREPARATION H) 1-0.25-14.4-15 % CREA cream     traZODone (DESYREL) 50 MG tablet     No current  "facility-administered medications for this visit.       Review of Systems  A complete 10 point review of systems was asked and answered in the negative unless specifically commented upon in the HPI    Objective:         Vitals:    10/25/22 1117   BP: 126/70   Pulse: 88   Weight: 101.1 kg (222 lb 12.8 oz)   Height: 1.626 m (5' 4\")     Body mass index is 38.24 kg/m .     Physical Exam    Vitals reviewed.   Constitutional: Well-developed, well-nourished, in no apparent distress.    HEENT: Normocephalic. no scleral icterus.  Neck/Lymph: Normal ROM  Cardiac:  Regular rate  Respiratory: Normal respiratory excursion  GI:  Abdomen soft, obese, non-tender.  Skin:  No jaundice.  Peripheral Vascular: no lower extremity edema.  Musculoskeletal:  ROM intact, normal muscle bulk    Psychiatric: Normal mood and affect. Behavior is normal.    Labs and Diagnostic tests:    reviewed    Imaging:  CT Abd 6/22  FINDINGS:   LOWER CHEST: Normal.     HEPATOBILIARY: Technically indeterminate 7 mm low-attenuation lesion in the right hepatic lobe superiorly on series 5, image 13. Liver elsewhere is negative. No calcified gallstones or biliary dilatation.     PANCREAS: Stable postoperative changes of distal pancreatectomy. No surrounding inflammatory changes or fluid collections. No pancreatic ductal dilatation.     SPLEEN: Surgically absent.     ADRENAL GLANDS: Left adrenal gland is absent. Normal right adrenal gland.     KIDNEYS/BLADDER: Lobulated renal contours bilaterally. No significant focal renal lesions. No hydronephrosis. Bladder is unremarkable.     BOWEL: Bowel is normal in caliber with no evidence for obstruction. Normal appendix. No definite acute bowel findings.     LYMPH NODES: Normal.     VASCULATURE: Unremarkable.     PELVIC ORGANS: IUD within the uterus.     MUSCULOSKELETAL: Normal.      Procedures:  Liver biopsy: 11/14/2019  FINAL DIAGNOSIS:   LIVER, NEEDLE BIOPSY:   - Mild ductopenia with no evidence of active parenchymal or " active biliary    tract disease.    MICROSCOPIC:   - Core needle biopsies are adequate for evaluation and show liver parenchyma with approximately 18 portal tracts.    - H&E sections show hepatic lobules with minimal steatosis (<5%)   and no ballooning degeneration, inflammation, or cholestasis.   - The portal tracts are remarkable for ductopenia, with absent bile ducts in   approximately 5 of the portal tracts (highlighted with CK7   immunohistochemistry), along with ceroid-laden macrophages.    - CK7 also stains periportal hepatocytes in areas of ductopenia.  The portal tracts do not contain significant inflammation or show interface activity.  - Ductular proliferation is not a prominent feature.   Florid duct lesions are not present.  There is no evidence of significant   fibrosis on trichrome stain.   -Special stain for reticulin shows preserved liver plate architecture.     The main finding in this biopsy is the presence of mild ductopenia,   without evidence of active duct injury.   An underlying cause for the ductopenia is not apparent in this biopsy, but    may have been secondary to an episode of prior bile duct injury (e.g. drug reaction) or idiopathic adult ductopenia.    There is only minimal steatosis in this biopsy and there is no evidence of steatohepatitis or significant fibrosis.       Assessment and Plan:    Abnormal Liver Tests:    -Full discussion as to differential in previous hepatology notes  -At this time felt most likely that ductopenia and abnormal liver tests are related to medication effect  -Felt that clozapine is most likely agent, and noted that the patient is weaning down on dose.  Ultimately defer to her prescribing and managing psychiatrist: At this time felt that the benefits of the patient having well-controlled mental health far outweigh the ongoing risks of mild hepatic inflammation.   certainly there will be concern for long-term inflammation leading to fibrosis and liver  dysfunction, but given this patient's tenuous mental health status being under excellent current control felt this is most prudent issue at this time  -Again thorough review of her medications and medication history was performed: Only other potential medications that she is on that have been documented to be described in bile duct loss would include montelukast; will defer to primary to assess if other potential medications could be used instead  -At this time there is no overt intervention from a hepatology perspective, and will see the patient once more in 1 year and review already ordered liver tests    High Risk for Non-alcoholic fatty liver disease  Non-Alcoholic Fatty Liver Disease  - I had a long discussion with the patient about nonalcoholic fatty liver disease (NAFLD).  We discussed how fat deposits in the liver, how this leads to inflammation, and how chronic inflammation (SAHNI) can ultimately lead to scarring and cirrhosis.  The long-term complications of fatty liver disease were discussed with the patient, including the increased risk of cardiovascular disease complications,the risk of developing diabetes (if not already), and variable progression to cirrhosis and end stage liver disease.  Management of NAFLD/SAHNI  - We spent time discussing an appropriate diet, exercise and weight loss plan.      - Recommend exercise regularly: 4+ times per week, with an average of about 40-45 minutes per day.      - It has shown that patients who exercise regularly can have improvement of insulin resistance and resolution of fatty liver disease, even if they are not able to lose weight.     - Recommend a low-carbohydrate, low-calorie diet (7092-1562 calories per day).    - An ideal weight loss plan would be to lose 7-10% of body weight over the next six months  - Recommend aggressive diabetes management: ideal goal Hgb A1c goal of =/< 7%. If possible addition of insulin sensitizing agents like metformin or liraglutide  "will be helpful.   - Management of cholesterol is also very important.    - The use of \"statins\" (HMG-CoA reductase medications) are an effective means of therapy and are not contra-indicated in those with abnormal liver tests OR those with cirrhosis.  The value of these medications in this population far outweigh the minor risks of abnormal liver tests.   - Goal LDL in those with SAHNI are < 100 mg/dL  - Consider the utility of liberalizing coffee consumption as some data that this may slow progression and reverse effects of SAHNI-related fibrosis.      Follow Up:  12 months     Thank you very much for the opportunity to participate in the care of this patient.  If you have any further questions, please don't hesitate to contact our office.    Thomas M. Leventhal, M.D.   of Medicine  Advanced & Transplant Hepatology  [facility]  "

## 2022-10-25 NOTE — LETTER
10/25/2022         RE: Divina Delgadillo  [id number] [facility] [address]        Dear Colleague,    Thank you for referring your patient, Divina Delgadillo, to the [facility] [place]. Please see a copy of my visit note below.    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    Since last seen in clinic she reports that she has been doing very well.  Her mental health has been in very good condition.  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  She is very happy that she been able to be independent and is looking for work closer to home.  She is been working closely with her psychiatrist with regard to her medications and does actually report that she has been weaning down on her Clozapine dose.  She is attempted to wean off this in the past with decompensation of her mental health, but reports she is on the third of the dose she was previous without overt changes.  She does report that she has put on significant weight but joined her local gym and she is excited to reengage.    We had long discussions today about lifestyle modifications including diet and exercise    Past Medical History:  Past Medical History:   Diagnosis Date     Acute pain of left knee 03/22/2019     Acute-on-chronic kidney injury (H) 03/22/2019     ARF (acute renal failure) (H) 03/23/2019     Cervical high risk HPV (human papillomavirus) test  positive 06/20/2017     Deep venous thrombosis of arm (H) 01/06/2017    ultrasound 1/6/2017-  venous thrombus in the antecubital fossa region and the left forearm as described     Depressive disorder      Depressive disorder, not elsewhere classified      DVT (deep venous thrombosis) (H)      Dysmetabolic syndrome X      Esophageal reflux     GERD     Hypertension 2014     Mild intermittent asthma      Near syncope 03/22/2019     Other specified types of schizophrenia, unspecified condition      Pancreatic cancer (H)     left adrenal gland, partial pancreas, and spleen removed; no chemo or radiation.      Suicidal behavior 05/26/2020     Type II or unspecified type diabetes mellitus without mention of complication, not stated as uncontrolled        Surgical History:  Past Surgical History:   Procedure Laterality Date     ADRENALECTOMY Left 2015     BIOPSY       BREAST SURGERY  2013     COLONOSCOPY       ENT SURGERY  10/01/2000    Tympanoplasty     IR LIVER BIOPSY PERCUTANEOUS  11/14/2019     PANCREATECTOMY PARTIAL  2015     PERCUTANEOUS BIOPSY LIVER Right 11/14/2019    Procedure: Percutaneous Liver Biopsy;  Surgeon: Sean Guzman PA-C;  Location: [facility] OR     SPLENECTOMY  2015     TONSILLECTOMY  10/01/2000    Tonsillectomy       Social History:  Social History     Tobacco Use     Smoking status: Never     Passive exposure: Yes     Smokeless tobacco: Current   Vaping Use     Vaping Use: Former   Substance Use Topics     Alcohol use: No     Drug use: No        Family History:  Family History   Problem Relation Age of Onset     Respiratory Mother         ASTHMA     Allergies Mother      Depression Mother      Chronic Obstructive Pulmonary Disease Mother      Anxiety Disorder Mother      Mental Illness Mother      Asthma Mother      Heart Disease Father         HEART VALVE REPLACEMENT     Hypertension Father      Diabetes Father      Depression Father      Anxiety Disorder Father      Mental Illness Father       Obesity Father      Respiratory Sister      Allergies Sister      Depression Sister      Respiratory Sister      Allergies Sister      Depression Sister      Respiratory Brother      Allergies Brother      Kidney Disease No family hx of        Medications:  Current Outpatient Medications   Medication     ACCU-CHEK GUIDE test strip     amLODIPine (NORVASC) 5 MG tablet     ARIPiprazole (ABILIFY) 30 MG tablet     atorvastatin (LIPITOR) 10 MG tablet     Biotin (BIOTIN FORTE) 5 MG TABS     blood glucose (ACCU-CHEK GUIDE) test strip     blood glucose monitoring (ACCU-CHEK CARLITOS PLUS) meter device kit     blood glucose monitoring (ACCU-CHEK FASTCLIX) lancets     blood glucose monitoring (NO BRAND SPECIFIED) meter device kit     carvedilol (COREG) 6.25 MG tablet     cloZAPine (CLOZARIL) 100 MG tablet     Continuous Blood Gluc Sensor (DEXCOM G6 SENSOR) MISC     Continuous Blood Gluc Transmit (DEXCOM G6 TRANSMITTER) MISC     FLUoxetine (PROZAC) 40 MG capsule     Insulin Aspart FlexPen 100 UNIT/ML SOPN     insulin pen needle (32G X 6 MM) 32G X 6 MM miscellaneous     insulin pen needle (ULTICARE MINI) 31G X 6 MM miscellaneous     lamoTRIgine (LAMICTAL) 100 MG tablet     LANTUS SOLOSTAR 100 UNIT/ML soln     lisinopril (ZESTRIL) 5 MG tablet     loperamide (IMODIUM A-D) 2 MG tablet     loratadine (CLARITIN) 10 MG tablet     montelukast (SINGULAIR) 10 MG tablet     omeprazole (PRILOSEC) 20 MG DR capsule     ondansetron (ZOFRAN ODT) 4 MG ODT tab     QUEtiapine (SEROQUEL) 25 MG tablet     acetaminophen (TYLENOL) 325 MG tablet     albuterol (VENTOLIN HFA) 108 (90 Base) MCG/ACT inhaler     bisacodyl (DULCOLAX) 10 MG suppository     calcium carbonate (TUMS) 500 MG chewable tablet     COMPOUNDED NON-CONTROLLED SUBSTANCE (CMPD RX) - PHARMACY TO MIX COMPOUNDED MEDICATION     fluocinolone acetonide 0.01 % OIL     glucose (BD GLUCOSE) 4 g chewable tablet     magnesium citrate solution     nicotine (NICODERM CQ) 7 MG/24HR 24 hr patch      "nystatin (MYCOSTATIN) 941304 UNIT/GM external ointment     NYSTATIN PO     polyethylene glycol (MIRALAX) 17 GM/Dose powder     pramox-pe-glycerin-petrolatum (PREPARATION H) 1-0.25-14.4-15 % CREA cream     traZODone (DESYREL) 50 MG tablet     No current facility-administered medications for this visit.       Review of Systems  A complete 10 point review of systems was asked and answered in the negative unless specifically commented upon in the HPI    Objective:         Vitals:    10/25/22 1117   BP: 126/70   Pulse: 88   Weight: 101.1 kg (222 lb 12.8 oz)   Height: 1.626 m (5' 4\")     Body mass index is 38.24 kg/m .     Physical Exam    Vitals reviewed.   Constitutional: Well-developed, well-nourished, in no apparent distress.    HEENT: Normocephalic. no scleral icterus.  Neck/Lymph: Normal ROM  Cardiac:  Regular rate  Respiratory: Normal respiratory excursion  GI:  Abdomen soft, obese, non-tender.  Skin:  No jaundice.  Peripheral Vascular: no lower extremity edema.  Musculoskeletal:  ROM intact, normal muscle bulk    Psychiatric: Normal mood and affect. Behavior is normal.    Labs and Diagnostic tests:    reviewed    Imaging:  CT Abd 6/22  FINDINGS:   LOWER CHEST: Normal.     HEPATOBILIARY: Technically indeterminate 7 mm low-attenuation lesion in the right hepatic lobe superiorly on series 5, image 13. Liver elsewhere is negative. No calcified gallstones or biliary dilatation.     PANCREAS: Stable postoperative changes of distal pancreatectomy. No surrounding inflammatory changes or fluid collections. No pancreatic ductal dilatation.     SPLEEN: Surgically absent.     ADRENAL GLANDS: Left adrenal gland is absent. Normal right adrenal gland.     KIDNEYS/BLADDER: Lobulated renal contours bilaterally. No significant focal renal lesions. No hydronephrosis. Bladder is unremarkable.     BOWEL: Bowel is normal in caliber with no evidence for obstruction. Normal appendix. No definite acute bowel findings.     LYMPH NODES: " Normal.     VASCULATURE: Unremarkable.     PELVIC ORGANS: IUD within the uterus.     MUSCULOSKELETAL: Normal.      Procedures:  Liver biopsy: 11/14/2019  FINAL DIAGNOSIS:   LIVER, NEEDLE BIOPSY:   - Mild ductopenia with no evidence of active parenchymal or active biliary    tract disease.    MICROSCOPIC:   - Core needle biopsies are adequate for evaluation and show liver parenchyma with approximately 18 portal tracts.    - H&E sections show hepatic lobules with minimal steatosis (<5%)   and no ballooning degeneration, inflammation, or cholestasis.   - The portal tracts are remarkable for ductopenia, with absent bile ducts in   approximately 5 of the portal tracts (highlighted with CK7   immunohistochemistry), along with ceroid-laden macrophages.    - CK7 also stains periportal hepatocytes in areas of ductopenia.  The portal tracts do not contain significant inflammation or show interface activity.  - Ductular proliferation is not a prominent feature.   Florid duct lesions are not present.  There is no evidence of significant   fibrosis on trichrome stain.   -Special stain for reticulin shows preserved liver plate architecture.     The main finding in this biopsy is the presence of mild ductopenia,   without evidence of active duct injury.   An underlying cause for the ductopenia is not apparent in this biopsy, but    may have been secondary to an episode of prior bile duct injury (e.g. drug reaction) or idiopathic adult ductopenia.    There is only minimal steatosis in this biopsy and there is no evidence of steatohepatitis or significant fibrosis.       Assessment and Plan:    Abnormal Liver Tests:    -Full discussion as to differential in previous hepatology notes  -At this time felt most likely that ductopenia and abnormal liver tests are related to medication effect  -Felt that clozapine is most likely agent, and noted that the patient is weaning down on dose.  Ultimately defer to her prescribing and managing  psychiatrist: At this time felt that the benefits of the patient having well-controlled mental health far outweigh the ongoing risks of mild hepatic inflammation.   certainly there will be concern for long-term inflammation leading to fibrosis and liver dysfunction, but given this patient's tenuous mental health status being under excellent current control felt this is most prudent issue at this time  -Again thorough review of her medications and medication history was performed: Only other potential medications that she is on that have been documented to be described in bile duct loss would include montelukast; will defer to primary to assess if other potential medications could be used instead  -At this time there is no overt intervention from a hepatology perspective, and will see the patient once more in 1 year and review already ordered liver tests    High Risk for Non-alcoholic fatty liver disease  Non-Alcoholic Fatty Liver Disease  - I had a long discussion with the patient about nonalcoholic fatty liver disease (NAFLD).  We discussed how fat deposits in the liver, how this leads to inflammation, and how chronic inflammation (SAHNI) can ultimately lead to scarring and cirrhosis.  The long-term complications of fatty liver disease were discussed with the patient, including the increased risk of cardiovascular disease complications,the risk of developing diabetes (if not already), and variable progression to cirrhosis and end stage liver disease.  Management of NAFLD/SAHNI  - We spent time discussing an appropriate diet, exercise and weight loss plan.      - Recommend exercise regularly: 4+ times per week, with an average of about 40-45 minutes per day.      - It has shown that patients who exercise regularly can have improvement of insulin resistance and resolution of fatty liver disease, even if they are not able to lose weight.     - Recommend a low-carbohydrate, low-calorie diet (5746-1599 calories per day).   "  - An ideal weight loss plan would be to lose 7-10% of body weight over the next six months  - Recommend aggressive diabetes management: ideal goal Hgb A1c goal of =/< 7%. If possible addition of insulin sensitizing agents like metformin or liraglutide will be helpful.   - Management of cholesterol is also very important.    - The use of \"statins\" (HMG-CoA reductase medications) are an effective means of therapy and are not contra-indicated in those with abnormal liver tests OR those with cirrhosis.  The value of these medications in this population far outweigh the minor risks of abnormal liver tests.   - Goal LDL in those with SAHNI are < 100 mg/dL  - Consider the utility of liberalizing coffee consumption as some data that this may slow progression and reverse effects of SAHNI-related fibrosis.      Follow Up:  12 months     Thank you very much for the opportunity to participate in the care of this patient.  If you have any further questions, please don't hesitate to contact our office.    Thomas M. Leventhal, M.D.   of Medicine  Advanced & Transplant Hepatology  [facility]      Again, thank you for allowing me to participate in the care of your patient.        Sincerely,        Thomas M. Leventhal, MD    "

## 2022-10-25 NOTE — Clinical Note
10/25/2022         RE: Divina Delgadillo  [id number] [facility] [address]        Dear Colleague,    Thank you for referring your patient, Divina Delgadillo, to the [facility] [place]. Please see a copy of my visit note below.    M, Tues, wed with arms worker     Friend swims at [employer] -     [place] for an hour     Food journal

## 2022-10-25 NOTE — Clinical Note
Delightful visit with Divina - I think this is still likely medication effect. - I know her psychiatrist is weaning down on her Clozapine; completely defer to them but will be a great test as to liver function - The only other medication that she is on that COULD be contributing would be the montelukast (based on review of the literature).  Could consider switching to an alternative (I have no idea what that would be!)  Eric HERRERA

## 2022-10-25 NOTE — PROGRESS NOTES
Diabetes Self-Management Education & Support    Presents for: Follow-up    Type of Service: In Person Visit    ASSESSMENT:  Divina is motivated to make changes and improve blood sugars; A1c needs to come down for surgery.  She made a list of goals and ideas and reviewed this in detail.  Brainstormed more foods to buy / try.  Has started some food logging to monitor this closer which will help with reviewing the insulin dosing.   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  Usually walks on the treadmill for an hour when doing this.  Continued to discuss novolog action and use.        Novolog   Snack/small meal 15-30gm carbohydrate: 3 units   Average meal 45-60gm carbohydrate: 5 units  Large meal 60gm+ carbohydrate: 7 units        Correction scale before breakfast, lunch, dinner only 1:40> 150  blood sugar Insulin   150-190 1 unit   191-230 2 units   231-270 3 units   271-310 4 units   311-350 5 units   351-390 6 units   391+ 7 units       Patient's most recent   Lab Results   Component Value Date    A1C 9.5 10/26/2022    A1C 7.4 07/08/2021     is not meeting goal of <7.0    Diabetes knowledge and skills assessment:   Patient is knowledgeable in diabetes management concepts related to: Healthy Eating, Being Active, Monitoring, Taking Medication, Problem Solving, Reducing Risks and Healthy Coping    Continue education with the following diabetes management concepts: Taking Medication, Problem Solving and Reducing Risks    Based on learning assessment above, most appropriate setting for further diabetes education would be: Individual setting.      PLAN  Continue to use NOvolog before carbohydrate containing meals  Continue with positive diet & lifestyle changes   Food logging   Follow up every 1-2 weeks     See Care Plan for co-developed, patient-state behavior change goals.  AVS provided for patient today.      SUBJECTIVE/OBJECTIVE:  Presents for: Follow-up  Accompanied by:  "Self  Diabetes education in the past 24mo: Yes  Focus of Visit: CGM  Diabetes type: Type 2  Disease course: Improving  Transportation concerns: Yes  Other concerns:: None  Cultural Influences/Ethnic Background:  Not  or     Diabetes Symptoms & Complications:  Fatigue: No     Patient Problem List and Family Medical History reviewed for relevant medical history, current medical status, and diabetes risk factors.    Vitals:  There were no vitals taken for this visit.  Estimated body mass index is 38.24 kg/m  as calculated from the following:    Height as of an earlier encounter on 10/25/22: 1.626 m (5' 4\").    Weight as of an earlier encounter on 10/25/22: 101.1 kg (222 lb 12.8 oz).   Last 3 BP:   BP Readings from Last 3 Encounters:   10/25/22 126/70   10/19/22 132/58   07/06/22 138/62       History   Smoking Status     Never   Smokeless Tobacco     Current       Labs:  Lab Results   Component Value Date    A1C 9.5 10/26/2022    A1C 7.4 07/08/2021     Lab Results   Component Value Date     10/26/2022     07/06/2022     07/08/2021     Lab Results   Component Value Date    LDL 55 06/17/2022    LDL 72 03/26/2021     HDL Cholesterol   Date Value Ref Range Status   03/26/2021 57 >49 mg/dL Final     Direct Measure HDL   Date Value Ref Range Status   06/17/2022 60 >=50 mg/dL Final   ]  GFR Estimate   Date Value Ref Range Status   10/19/2022 51 (L) >60 mL/min/1.73m2 Final     Comment:     Effective December 21, 2021 eGFRcr in adults is calculated using the 2021 CKD-EPI creatinine equation which includes age and gender (Valentina et al., NEJM, DOI: 10.1056/FUGAzp3882144)   07/08/2021 52 (L) >60 mL/min/[1.73_m2] Final     Comment:     Non  GFR Calc  Starting 12/18/2018, serum creatinine based estimated GFR (eGFR) will be   calculated using the Chronic Kidney Disease Epidemiology Collaboration   (CKD-EPI) equation.       GFR Estimate If Black   Date Value Ref Range Status "   07/08/2021 60 (L) >60 mL/min/[1.73_m2] Final     Comment:      GFR Calc  Starting 12/18/2018, serum creatinine based estimated GFR (eGFR) will be   calculated using the Chronic Kidney Disease Epidemiology Collaboration   (CKD-EPI) equation.       Lab Results   Component Value Date    CR 1.37 10/19/2022    CR 1.32 07/08/2021     No results found for: MICROALBUMIN    Healthy Eating:  Healthy Eating Assessed Today: No  Meal planning/habits: Smaller portions (Varies between portion control and avoiding sweets to no meal planning)  Beverages: Water (reports adding in some juice again)  Has patient met with a dietitian in the past?: Yes    Being Active:  Being Active Assessed Today: Yes  Exercise:: Yes  How intense was your typical exercise? : Moderate (like brisk walking)    Monitoring:  Monitoring Assessed Today: Yes  Did patient bring glucose meter to appointment? : Yes  Blood Glucose Meter: CGM    Taking Medications:  Diabetes Medication(s)     Diabetic Other       glucose (BD GLUCOSE) 4 g chewable tablet    Take 1 tablet by mouth every hour as needed for low blood sugar     Patient not taking: Reported on 4/20/2022    Insulin       Insulin Aspart FlexPen 100 UNIT/ML SOPN    inject ONE TO FOUR units subcutaneous THREE TIMES DAILY with meals. ONE unit FOR SMALL meal. TWO units FOR larger meal plus sliding scale. max FOUR units with meal. OR UP TO 12 units PER DAY.     LANTUS SOLOSTAR 100 UNIT/ML soln    Inject 29 Units Subcutaneous every morning          Taking Medication Assessed Today: Yes  Current Treatments: Insulin Injections, Non-insulin Injectables  Problems taking diabetes medications regularly?: Yes  Diabetes medication side effects?: No    Problem Solving:  Problem Solving Assessed Today: No  Hypoglycemia Frequency: Rarely (not recently)  Patient carries a carbohydrate source: Yes      Reducing Risks:  Reducing Risks Assessed Today: No    Healthy Coping:  Healthy Coping Assessed Today:  Yes  Emotional response to diabetes: Ready to learn  Informal Support system:: Friends, Family, Other  Stage of change: ACTION (Actively working towards change) (varies between preparation and action)  Support resources: Websites, In-person Offerings  Patient Activation Measure Survey Score:  EMY Score (Last Two) 5/10/2018   EMY Raw Score 35   Activation Score 72.1   EMY Level 3         Care Plan and Education Provided:  Care Plan: Diabetes   Updates made by Dolly Riley RD since 11/10/2022 12:00 AM      Problem: Diabetes Self-Management Education Needed to Optimize Self-Care Behaviors       Goal: Healthy Eating - follow a healthy eating pattern for diabetes       Task: Provide education on eating out Completed 11/10/2022   Responsible User: Dolly Riley RD      Task: Develop individualized healthy eating plan with patient Completed 11/10/2022   Responsible User: Dolly Riley RD      Goal: Being Active - get regular physical activity, working up to at least 150 minutes per week       Task: Develop physical activity plan with patient Completed 11/10/2022   Responsible User: Dolly Riley RD      Goal: Taking Medication - patient is consistently taking medications as directed       Task: Provide education on insulin and injectable diabetes medications, including administration, storage, site selection and rotation for injection sites Completed 11/10/2022   Responsible User: Dolly Riley RD Elizabeth Swartout RD, DANIEL, [facility]  Diabetes Education      Time Spent: 40 minutes  Encounter Type: Individual

## 2022-10-25 NOTE — NURSING NOTE
"Chief Complaint   Patient presents with     RECHECK     Follow up     /70   Pulse 88   Ht 1.626 m (5' 4\")   Wt 101.1 kg (222 lb 12.8 oz)   BMI 38.24 kg/m    Adia Fonseca CMA on 10/25/2022 at 11:19 AM    "

## 2022-10-26 ENCOUNTER — LAB (OUTPATIENT)
Dept: LAB | Facility: CLINIC | Age: 37
End: 2022-10-26
Payer: COMMERCIAL

## 2022-10-26 ENCOUNTER — TELEPHONE (OUTPATIENT)
Dept: FAMILY MEDICINE | Facility: CLINIC | Age: 37
End: 2022-10-26

## 2022-10-26 DIAGNOSIS — F20.9 SCHIZOPHRENIA, UNSPECIFIED TYPE (H): ICD-10-CM

## 2022-10-26 DIAGNOSIS — N18.31 TYPE 2 DIABETES MELLITUS WITH STAGE 3A CHRONIC KIDNEY DISEASE, WITH LONG-TERM CURRENT USE OF INSULIN (H): ICD-10-CM

## 2022-10-26 DIAGNOSIS — E11.22 TYPE 2 DIABETES MELLITUS WITH STAGE 3A CHRONIC KIDNEY DISEASE, WITH LONG-TERM CURRENT USE OF INSULIN (H): ICD-10-CM

## 2022-10-26 DIAGNOSIS — Z79.4 TYPE 2 DIABETES MELLITUS WITH STAGE 3A CHRONIC KIDNEY DISEASE, WITH LONG-TERM CURRENT USE OF INSULIN (H): ICD-10-CM

## 2022-10-26 LAB
BASOPHILS # BLD AUTO: 0.1 10E3/UL (ref 0–0.2)
BASOPHILS NFR BLD AUTO: 1 %
EOSINOPHIL # BLD AUTO: 0.3 10E3/UL (ref 0–0.7)
EOSINOPHIL NFR BLD AUTO: 1 %
ERYTHROCYTE [DISTWIDTH] IN BLOOD BY AUTOMATED COUNT: 14.6 % (ref 10–15)
FASTING STATUS PATIENT QL REPORTED: NO
GLUCOSE SERPL-MCNC: 142 MG/DL (ref 70–99)
HBA1C MFR BLD: 9.5 % (ref 0–5.6)
HCT VFR BLD AUTO: 37.1 % (ref 35–47)
HGB BLD-MCNC: 11.6 G/DL (ref 11.7–15.7)
IMM GRANULOCYTES # BLD: 0.1 10E3/UL
IMM GRANULOCYTES NFR BLD: 1 %
LYMPHOCYTES # BLD AUTO: 4.4 10E3/UL (ref 0.8–5.3)
LYMPHOCYTES NFR BLD AUTO: 23 %
MCH RBC QN AUTO: 29.9 PG (ref 26.5–33)
MCHC RBC AUTO-ENTMCNC: 31.3 G/DL (ref 31.5–36.5)
MCV RBC AUTO: 96 FL (ref 78–100)
MONOCYTES # BLD AUTO: 1.2 10E3/UL (ref 0–1.3)
MONOCYTES NFR BLD AUTO: 6 %
NEUTROPHILS # BLD AUTO: 13 10E3/UL (ref 1.6–8.3)
NEUTROPHILS NFR BLD AUTO: 68 %
PLATELET # BLD AUTO: 427 10E3/UL (ref 150–450)
RBC # BLD AUTO: 3.88 10E6/UL (ref 3.8–5.2)
WBC # BLD AUTO: 19 10E3/UL (ref 4–11)

## 2022-10-26 PROCEDURE — 85025 COMPLETE CBC W/AUTO DIFF WBC: CPT

## 2022-10-26 PROCEDURE — 82947 ASSAY GLUCOSE BLOOD QUANT: CPT

## 2022-10-26 PROCEDURE — 83036 HEMOGLOBIN GLYCOSYLATED A1C: CPT

## 2022-10-26 PROCEDURE — 36415 COLL VENOUS BLD VENIPUNCTURE: CPT

## 2022-10-26 NOTE — TELEPHONE ENCOUNTER
Reason for call:    Symptom or request:     Patient called with concerns about blood sugars being elevated, last night bs before bed were 120 today bs w/o food was 220, she is asking what she should do.     Denies concerns or problem asking if her monitor needs to be recalculated.       Best Time:  any    Can we leave a detailed message on this number?  YES     Juanita CACERES  Station

## 2022-10-26 NOTE — TELEPHONE ENCOUNTER
Patient has met with Katiana HAQ Yesterday.  Re joined her gym and is watching her diet. Debora LOPEZ RN

## 2022-10-27 ENCOUNTER — MYC MEDICAL ADVICE (OUTPATIENT)
Dept: FAMILY MEDICINE | Facility: CLINIC | Age: 37
End: 2022-10-27

## 2022-10-27 DIAGNOSIS — J45.20 MILD INTERMITTENT ASTHMA WITHOUT COMPLICATION: Primary | ICD-10-CM

## 2022-10-27 RX ORDER — SEMAGLUTIDE 1.34 MG/ML
INJECTION, SOLUTION SUBCUTANEOUS
Qty: 3 ML | Refills: 2 | OUTPATIENT
Start: 2022-10-27

## 2022-10-27 RX ORDER — LORATADINE 10 MG/1
10 TABLET ORAL DAILY
Qty: 30 TABLET | Refills: 3 | Status: SHIPPED | OUTPATIENT
Start: 2022-10-27 | End: 2022-11-18

## 2022-10-27 NOTE — TELEPHONE ENCOUNTER
See MyChart response from patient, routing to PCP for review.    Sapphire Bejarano RN  [facility]

## 2022-11-10 ENCOUNTER — LAB (OUTPATIENT)
Dept: LAB | Facility: CLINIC | Age: 37
End: 2022-11-10
Payer: COMMERCIAL

## 2022-11-10 DIAGNOSIS — Z79.4 TYPE 2 DIABETES MELLITUS WITH STAGE 3A CHRONIC KIDNEY DISEASE, WITH LONG-TERM CURRENT USE OF INSULIN (H): ICD-10-CM

## 2022-11-10 DIAGNOSIS — N18.31 TYPE 2 DIABETES MELLITUS WITH STAGE 3A CHRONIC KIDNEY DISEASE, WITH LONG-TERM CURRENT USE OF INSULIN (H): ICD-10-CM

## 2022-11-10 DIAGNOSIS — E11.42 TYPE 2 DIABETES MELLITUS WITH DIABETIC POLYNEUROPATHY (H): ICD-10-CM

## 2022-11-10 DIAGNOSIS — E11.65 TYPE 2 DIABETES MELLITUS WITH HYPERGLYCEMIA, WITH LONG-TERM CURRENT USE OF INSULIN (H): Primary | ICD-10-CM

## 2022-11-10 DIAGNOSIS — E11.22 TYPE 2 DIABETES MELLITUS WITH STAGE 3A CHRONIC KIDNEY DISEASE, WITH LONG-TERM CURRENT USE OF INSULIN (H): ICD-10-CM

## 2022-11-10 DIAGNOSIS — Z79.4 TYPE 2 DIABETES MELLITUS WITH HYPERGLYCEMIA, WITH LONG-TERM CURRENT USE OF INSULIN (H): Primary | ICD-10-CM

## 2022-11-10 LAB
FASTING STATUS PATIENT QL REPORTED: YES
GLUCOSE SERPL-MCNC: 77 MG/DL (ref 70–99)

## 2022-11-10 PROCEDURE — 82947 ASSAY GLUCOSE BLOOD QUANT: CPT

## 2022-11-10 PROCEDURE — 36415 COLL VENOUS BLD VENIPUNCTURE: CPT

## 2022-11-10 RX ORDER — INSULIN ASPART 100 [IU]/ML
1-14 INJECTION, SOLUTION INTRAVENOUS; SUBCUTANEOUS
Qty: 15 ML | Refills: 0 | Status: SHIPPED | OUTPATIENT
Start: 2022-11-10 | End: 2022-12-02

## 2022-11-10 RX ORDER — INSULIN ASPART 100 [IU]/ML
1-14 INJECTION, SOLUTION INTRAVENOUS; SUBCUTANEOUS
Qty: 15 ML | Refills: 0 | Status: SHIPPED | OUTPATIENT
Start: 2022-11-10 | End: 2022-11-10

## 2022-11-10 RX ORDER — INSULIN ASPART 100 [IU]/ML
INJECTION, SOLUTION INTRAVENOUS; SUBCUTANEOUS
Qty: 15 ML | Refills: 0 | Status: SHIPPED | OUTPATIENT
Start: 2022-11-10 | End: 2022-11-10

## 2022-11-10 NOTE — TELEPHONE ENCOUNTER
Updated Novolog prescription with new dosing and resent to Anders ibarra.     Dolly Riley RD, LD, [facility]  Diabetes Education

## 2022-11-12 ENCOUNTER — MYC MEDICAL ADVICE (OUTPATIENT)
Dept: FAMILY MEDICINE | Facility: CLINIC | Age: 37
End: 2022-11-12

## 2022-11-13 DIAGNOSIS — I10 ESSENTIAL HYPERTENSION: Chronic | ICD-10-CM

## 2022-11-14 ENCOUNTER — ALLIED HEALTH/NURSE VISIT (OUTPATIENT)
Dept: EDUCATION SERVICES | Facility: CLINIC | Age: 37
End: 2022-11-14
Attending: NURSE PRACTITIONER
Payer: COMMERCIAL

## 2022-11-14 DIAGNOSIS — E11.42 TYPE 2 DIABETES MELLITUS WITH DIABETIC POLYNEUROPATHY (H): ICD-10-CM

## 2022-11-14 PROCEDURE — G0108 DIAB MANAGE TRN  PER INDIV: HCPCS | Mod: 95 | Performed by: DIETITIAN, REGISTERED

## 2022-11-14 NOTE — TELEPHONE ENCOUNTER
"Forwarding José Luist request to PCP/team since she's out.    History: At pre-op visit with PCP on 10/19/22, patient not cleared for surgery due to uncontrolled diabetes- glucose was 319 and A1C 9.6.  Meds adjusted and patient saw DE.  Repeat glucose on 10/26/22 was 142 fasting and 9.5 A1C.  Then fasting glucose on 11/10/22 was 77-result note is pasted below.  Okay for patient to proceed with surgery, and can you addend pre-op and have MA/PSC fax it?    Mer Arthur RN  [facility]      \"Divina Payton      Your glucose readings continue to improve, if you would like to schedule your surgery, we can recheck A1C in 1-2 months again instead of waiting for 3 months and hopefully it will be better, ideally for surgery your A1C should be 7.5% or less, I will order A1C recheck for you.     Jaleesa\"  "

## 2022-11-14 NOTE — TELEPHONE ENCOUNTER
"Requested Prescriptions   Pending Prescriptions Disp Refills    amLODIPine (NORVASC) 5 MG tablet [Pharmacy Med Name: amlodipine 5 mg tablet] 90 tablet 1     Sig: Take 1 tablet (5 mg) by mouth daily (with dinner)       Calcium Channel Blockers Protocol  Failed - 11/13/2022  8:02 AM        Failed - Normal serum creatinine on file in past 12 months     Recent Labs   Lab Test 10/19/22  0739 09/19/17  1503 09/01/17  1049   CR 1.37*   < >  --    CREAT  --   --  1.6*    < > = values in this interval not displayed.       Ok to refill medication if creatinine is low          Passed - Blood pressure under 140/90 in past 12 months     BP Readings from Last 3 Encounters:   10/25/22 126/70   10/19/22 132/58   07/06/22 138/62                 Passed - Recent (12 mo) or future (30 days) visit within the authorizing provider's specialty     Patient has had an office visit with the authorizing provider or a provider within the authorizing providers department within the previous 12 mos or has a future within next 30 days. See \"Patient Info\" tab in inbasket, or \"Choose Columns\" in Meds & Orders section of the refill encounter.              Passed - Medication is active on med list        Passed - Patient is age 18 or older        Passed - No active pregnancy on record        Passed - No positive pregnancy test in past 12 months             "

## 2022-11-14 NOTE — TELEPHONE ENCOUNTER
Learning About Stitches and Staples Removal  When are stitches and staples removed? Your doctor will tell you when to have your stitches or staples removed, usually in 7 to 14 days. How long you'll be told to wait will depend on things like where the wound is located, how big and how deep the wound is, and what your general health is like. Do not remove the stitches on your own. Stitches on the face are usually removed within a week. But stitches and staples on other areas of the body, such as on the back or belly or over a joint, may need to stay in place longer, often a week or two. Be sure to follow your doctor's instructions. How are stitches and staples removed? It usually doesn't hurt when the doctor removes the stitches or staples. You may feel a tug as each stitch or staple is removed. · You will either be seated or lying down. · To remove stitches, the doctor will use scissors to cut each of the knots and then pull the threads out. · To remove staples, the doctor will use a tool to take out the staples one at a time. · The area may still feel tender after the stitches or staples are gone. But it should feel better within a few minutes or up to a few hours. What can you expect after stitches and staples are removed? Depending on the type and location of the cut, you will have a scar. Scars usually fade over time. Keep the area clean, but you won't need a bandage. When should you call for help? Call your doctor now or seek immediate medical care if :  · You have new pain, or your pain gets worse. · You have trouble moving the area near the scar. · You have symptoms of infection, such as:  ? Increased pain, swelling, warmth, or redness around the scar. ? Red streaks leading from the scar. ? Pus draining from the scar. ? A fever. Watch closely for changes in your health, and be sure to contact your doctor if:   · The scar opens. · You do not get better as expected.   Follow-up care is a Pt called. Adv message sent to provider and awaiting response    Jose De Jesus Guido RN     key part of your treatment and safety. Be sure to make and go to all appointments, and call your doctor if you do not get better as expected. It's also a good idea to keep a list of the medicines you take. Where can you learn more? Go to http://www.gray.com/  Enter L659 in the search box to learn more about \"Learning About Stitches and Staples Removal.\"  Current as of: June 26, 2019               Content Version: 12.6  © 0765-6055 [employer]. Care instructions adapted under license by [employer] (which disclaims liability or warranty for this information). If you have questions about a medical condition or this instruction, always ask your healthcare professional. [address] any warranty or liability for your use of this information.

## 2022-11-14 NOTE — TELEPHONE ENCOUNTER
I recommend sticking to Jaleesa's original plan, which is stated in Mer's note below.  Bianca Rdz, CNP

## 2022-11-14 NOTE — PROGRESS NOTES
Diabetes Self-Management Education & Support    Presents for: Follow-up    Type of Service: In Person Visit    ASSESSMENT:  Reviewed positive changes with diet, activity and medication use.   Reinforced these habits and Divina remains very motivated.  Is watching Dexcom closely and seeing very few 200s (these are mostly post prandial peaks) - most blood sugars throughout the day are stable at . Is taking the Novolog before eating as best as able now and notices that this helps control the increases well.  Worked on connecting Dexcom data to [employer].   Reviewed long acting insulin - pre op recommendations from PCP were 20% reduction.  With Lantus pulling blood sugars down, first recommended decreased from 29 to 26 and then reducing ~ 20%,  To 20 units on day of surgery. Can have clear liquids up until 2 hours before, so will be able to treat sensor glucose drops if the basal insulin is still too strong.     Patient's most recent   Lab Results   Component Value Date    A1C 9.5 10/26/2022    A1C 7.4 07/08/2021     is not meeting goal of <7.0    Diabetes knowledge and skills assessment:   Patient is knowledgeable in diabetes management concepts related to: Healthy Eating, Being Active, Monitoring, Taking Medication, Problem Solving, Reducing Risks and Healthy Coping  Continue education with the following diabetes management concepts: diet, insulin use/adjustments, goal setting   Based on learning assessment above, most appropriate setting for further diabetes education would be: Individual setting.      PLAN  Continue with positive diet & lifestyle changes   Connect on [employer] for data sharing  Re-assess mealtime doses / continue to watch how these impact post prandial rise.   Follow up on [facility] in 1-2 weeks  See Care Plan for co-developed, patient-state behavior change goals.  AVS provided for patient today.    SUBJECTIVE/OBJECTIVE:  Presents for: Follow-up  Accompanied by: Self  Diabetes education in the past  "24mo: Yes  Focus of Visit: CGM  Diabetes type: Type 2  Disease course: Improving  Transportation concerns: Yes  Other concerns:: None  Cultural Influences/Ethnic Background:  Not  or     Diabetes Symptoms & Complications:  Fatigue: No     Patient Problem List and Family Medical History reviewed for relevant medical history, current medical status, and diabetes risk factors.    Vitals:  There were no vitals taken for this visit.  Estimated body mass index is 38.24 kg/m  as calculated from the following:    Height as of 10/25/22: 1.626 m (5' 4\").    Weight as of 10/25/22: 101.1 kg (222 lb 12.8 oz).   Last 3 BP:   BP Readings from Last 3 Encounters:   10/25/22 126/70   10/19/22 132/58   07/06/22 138/62       History   Smoking Status     Never   Smokeless Tobacco     Current       Labs:  Lab Results   Component Value Date    A1C 9.5 10/26/2022    A1C 7.4 07/08/2021     Lab Results   Component Value Date    GLC 77 11/10/2022     07/06/2022     07/08/2021     Lab Results   Component Value Date    LDL 55 06/17/2022    LDL 72 03/26/2021     HDL Cholesterol   Date Value Ref Range Status   03/26/2021 57 >49 mg/dL Final     Direct Measure HDL   Date Value Ref Range Status   06/17/2022 60 >=50 mg/dL Final   ]  GFR Estimate   Date Value Ref Range Status   10/19/2022 51 (L) >60 mL/min/1.73m2 Final     Comment:     Effective December 21, 2021 eGFRcr in adults is calculated using the 2021 CKD-EPI creatinine equation which includes age and gender (Valentina rousseau al., NEJM, DOI: 10.1056/JLVMsv8208035)   07/08/2021 52 (L) >60 mL/min/[1.73_m2] Final     Comment:     Non  GFR Calc  Starting 12/18/2018, serum creatinine based estimated GFR (eGFR) will be   calculated using the Chronic Kidney Disease Epidemiology Collaboration   (CKD-EPI) equation.       GFR Estimate If Black   Date Value Ref Range Status   07/08/2021 60 (L) >60 mL/min/[1.73_m2] Final     Comment:      GFR " Calc  Starting 12/18/2018, serum creatinine based estimated GFR (eGFR) will be   calculated using the Chronic Kidney Disease Epidemiology Collaboration   (CKD-EPI) equation.       Lab Results   Component Value Date    CR 1.37 10/19/2022    CR 1.32 07/08/2021     No results found for: MICROALBUMIN    Healthy Eating:  Healthy Eating Assessed Today: No  Meal planning/habits: Smaller portions (Varies between portion control and avoiding sweets to no meal planning)  Beverages: Water (reports adding in some juice again)  Has patient met with a dietitian in the past?: Yes    Being Active:  Being Active Assessed Today: Yes  Exercise:: Yes  How intense was your typical exercise? : Moderate (like brisk walking)    Monitoring:  Monitoring Assessed Today: Yes  Did patient bring glucose meter to appointment? : Yes  Blood Glucose Meter: CGM    Taking Medications:  Diabetes Medication(s)     Diabetic Other       glucose (BD GLUCOSE) 4 g chewable tablet    Take 1 tablet by mouth every hour as needed for low blood sugar     Patient not taking: Reported on 4/20/2022    Insulin       Insulin Aspart FlexPen 100 UNIT/ML SOPN    Inject 1-14 Units Subcutaneous 3 times daily (with meals) (Use Novlog based on meal size small meal 15-30gm carb: 3 units, average meal 45-60gm carb: 5 units, large meal 60gm+ carb: 7 units + pre meal correction 1:40>150.  Max dose per 24 hours is 50 units)     LANTUS SOLOSTAR 100 UNIT/ML soln    Inject 29 Units Subcutaneous every morning      Novolog   Snack/small meal 15-30gm carbohydrate: 3 units   Average meal 45-60gm carbohydrate: 5 units  Large meal 60gm+ carbohydrate: 7 units        Correction scale before breakfast, lunch, dinner only 1:40> 150  blood sugar Insulin   150-190 1 unit   191-230 2 units   231-270 3 units   271-310 4 units   311-350 5 units   351-390 6 units   391+ 7 units       Taking Medication Assessed Today: Yes  Current Treatments: Insulin Injections, Non-insulin Injectables  Problems  taking diabetes medications regularly?: Yes  Diabetes medication side effects?: No    Problem Solving:  Problem Solving Assessed Today: No  Hypoglycemia Frequency: Rarely (not recently)  Patient carries a carbohydrate source: Yes      Reducing Risks:  Reducing Risks Assessed Today: No    Healthy Coping:  Healthy Coping Assessed Today: Yes  Emotional response to diabetes: Ready to learn  Informal Support system:: Friends, Family, Other  Stage of change: ACTION (Actively working towards change) (varies between preparation and action)  Support resources: Websites, In-person Offerings  Patient Activation Measure Survey Score:  EMY Score (Last Two) 5/10/2018   EMY Raw Score 35   Activation Score 72.1   EMY Level 3         Care Plan and Education Provided:  GLP-1 administration technique taught today. Patient verbalized understanding and was able to perform an accurate return demonstration of administration technique. Side effects were discussed, if patient has any abdominal pain, with or without nausea and/or vomiting, stop medication, call provider, clinic or go to the emergency room.    Dolly Riley RD, DANIEL, [facility]  Diabetes Education      Time Spent: 35 minutes  Encounter Type: Individual

## 2022-11-15 ENCOUNTER — MYC MEDICAL ADVICE (OUTPATIENT)
Dept: OTOLARYNGOLOGY | Facility: CLINIC | Age: 37
End: 2022-11-15

## 2022-11-15 DIAGNOSIS — H92.13 OTORRHEA, BILATERAL: ICD-10-CM

## 2022-11-15 DIAGNOSIS — H71.13 GRANULOMA OF TYMPANIC MEMBRANE, BILATERAL: Primary | ICD-10-CM

## 2022-11-15 RX ORDER — CIPROFLOXACIN AND DEXAMETHASONE 3; 1 MG/ML; MG/ML
SUSPENSION/ DROPS AURICULAR (OTIC)
Qty: 10 ML | Refills: 3 | Status: SHIPPED | OUTPATIENT
Start: 2022-11-15 | End: 2023-01-11

## 2022-11-15 RX ORDER — AMLODIPINE BESYLATE 5 MG/1
5 TABLET ORAL
Qty: 90 TABLET | Refills: 0 | Status: SHIPPED | OUTPATIENT
Start: 2022-11-15 | End: 2022-12-19

## 2022-11-15 NOTE — TELEPHONE ENCOUNTER
LOV 2/11/22: Patient was due to be seen 4-6 weeks   Patient was anu home with otic insufflator and ear powder at that time    Please see patient's message.   Would you like her to start ear drops or come on one of your hold spots?     Thanks,   Michael MCQUEEN RN  Specialty Clinics

## 2022-11-16 NOTE — PROGRESS NOTES
Chief Complaint   Patient presents with     RECHECK     History of Present Illness  Divina Delgadillo is a 37 year old female who presents today for follow-up. The patient  has a history of otitis externa with granulation of the tympanic membrane and recurrent drainage.  In July 2021, I treated her with a longer course of steroid eardrops with resolution of her symptoms.  She does have some retraction of the tympanic membrane but no deep retraction pockets or evidence of cholesteatoma.   I last evaluated the patient on 2/11/2022.  She was being treated with eardrops for bilateral otitis externa with granulation and drainage that resolved nicely after treatment.     Since last seeing the patient, the patient reports of a week history of ear plugging, fullness, drainage.  She feels like the hearing is slightly down in both ears.  She does intermittently have some problems of dizziness and imbalance usually during the day.  She has had ear tubes as a child but no other recent ear surgery.  No significant noise exposure history, heavy machinery, farm equipment, firearm use.  She does have intermittent tinnitus.     I did review her temporal bone CT from 11/26/2018.  There was a small soft tissue density in right Prussic's space with some significant retraction of the right tympanic membrane.  There did not really appear to be any significant erosion of the scutum.  There is opacification of the right mastoid air cells.  On the left, the tympanic membrane was severely retracted.  There is no middle ear opacification, opacification of thoracic space, or scutal erosion.  The mastoid was under aerated on the left.    Past Medical History  Patient Active Problem List   Diagnosis     Esophageal reflux     NAFL (nonalcoholic fatty liver)     Essential hypertension     Hyperlipidemia LDL goal <100     Stage 3 chronic kidney disease     Mucinous neoplasm of pancreas     Type 2 diabetes mellitus with stage 3 chronic kidney  disease, with long-term current use of insulin (H)     Bipolar disorder (H)     Cervical high risk HPV (human papillomavirus) test positive     IUD (intrauterine device) in place     Morbid obesity (H)     Mild intermittent asthma with acute exacerbation     Nicotine abuse     Chronic leukocytosis     Acquired asplenia     Borderline personality disorder (H)     Asthma     PTSD (post-traumatic stress disorder)     Long term (current) use of anticoagulants     Orthostatic hypotension     Tobacco abuse     Vitamin D insufficiency     Depressive disorder     Schizoaffective disorder, depressive type (H)     Uncontrolled type 2 diabetes mellitus with diabetic polyneuropathy, with long-term current use of insulin     Cardiac murmur     Type 2 diabetes mellitus (H)     History of DVT of arm  (deep vein thrombosis)     Insomnia, unspecified type     Ulnar neuropathy of both upper extremities     Current Medications    Current Outpatient Medications:      ACCU-CHEK GUIDE test strip, Use to test blood sugar 3 times daily or as directed., Disp: 150 strip, Rfl: 1     acetaminophen (TYLENOL) 325 MG tablet, Take 325-650 mg by mouth 2 tab every 4-6 hours as needed, do not exceed 9 tabs in 24hours, Disp: , Rfl:      albuterol (VENTOLIN HFA) 108 (90 Base) MCG/ACT inhaler, INHALE 2 PUFFS INTO THE LUNGS EVERY SIX  HOURS AS NEEDED FOR SHORTNESS OF BREATH, Disp: 18 g, Rfl: 10     amLODIPine (NORVASC) 5 MG tablet, Take 1 tablet (5 mg) by mouth daily (with dinner), Disp: 90 tablet, Rfl: 0     ARIPiprazole (ABILIFY) 30 MG tablet, Take 30 mg by mouth daily, Disp: , Rfl:      atorvastatin (LIPITOR) 10 MG tablet, Take 1 tablet (10 mg) by mouth daily, Disp: 90 tablet, Rfl: 3     Biotin (BIOTIN FORTE) 5 MG TABS, Take 1 tablet by mouth daily, Disp: 60 tablet, Rfl: 1     blood glucose (ACCU-CHEK GUIDE) test strip, TEST BLOOD SUGAR THREE TIMES DAILY, Disp: 100 strip, Rfl: 1     blood glucose monitoring (ACCU-CHEK CARLITOS PLUS) meter device kit, Use  to test blood sugar three times daily or as directed., Disp: 1 kit, Rfl: 0     blood glucose monitoring (ACCU-CHEK FASTCLIX) lancets, Use to test blood sugar 3 times daily, Disp: 102 each, Rfl: 3     blood glucose monitoring (NO BRAND SPECIFIED) meter device kit, Use to test blood sugar 3 times daily or as directed.  Accu Chek Guide Meter., Disp: 1 kit, Rfl: 1     carvedilol (COREG) 6.25 MG tablet, Take 1 tablet (6.25 mg) by mouth 2 times daily, Disp: 60 tablet, Rfl: 3     ciprofloxacin-dexamethasone (CIPRODEX) 0.3-0.1 % otic suspension, Place 5 drops in draining ear twice daily for 10 days.  Call if drainage persistent past 10 days., Disp: 10 mL, Rfl: 3     cloZAPine (CLOZARIL) 100 MG tablet, Take 100 mg by mouth 2 times daily , Disp: , Rfl:      Continuous Blood Gluc Sensor (DEXCOM G6 SENSOR) MISC, 1 each every 10 days. Change every 10 days. USE TO READ BLOOD SUGARS PER  INSTRUCTIONS, Disp: 9 each, Rfl: 1     Continuous Blood Gluc Transmit (DEXCOM G6 TRANSMITTER) MISC, 1 each every 3 months Change every 3 months. USE TO READ BLOOD SUGARS PER  INSTRUCTIONS, Disp: 1 each, Rfl: 1     fluocinolone acetonide 0.01 % OIL, Place 4 drops in ear(s) 2 times daily as needed (ear itching), Disp: 20 mL, Rfl: 3     FLUoxetine (PROZAC) 40 MG capsule, Take 40 mg by mouth daily, Disp: , Rfl:      glucose (BD GLUCOSE) 4 g chewable tablet, Take 1 tablet by mouth every hour as needed for low blood sugar, Disp: 30 tablet, Rfl: 4     Insulin Aspart FlexPen 100 UNIT/ML SOPN, Inject 1-14 Units Subcutaneous 3 times daily (with meals) (Use Novlog based on meal size small meal 15-30gm carb: 3 units, average meal 45-60gm carb: 5 units, large meal 60gm+ carb: 7 units + pre meal correction 1:40>150.  Max dose per 24 hours is 50 units), Disp: 15 mL, Rfl: 0     insulin pen needle (32G X 6 MM) 32G X 6 MM miscellaneous, Use 4 pen needles daily., Disp: 150 each, Rfl: 10     insulin pen needle (ULTICARE MINI) 31G X 6 MM  miscellaneous, USE 1 PEN NEEDLE DAILY, Disp: 100 each, Rfl: 10     lamoTRIgine (LAMICTAL) 100 MG tablet, Take 100 mg by mouth At Bedtime, Disp: , Rfl:      LANTUS SOLOSTAR 100 UNIT/ML soln, Inject 29 Units Subcutaneous every morning, Disp: 15 mL, Rfl: 1     lisinopril (ZESTRIL) 5 MG tablet, Take 1 tablet (5 mg) by mouth daily, Disp: 90 tablet, Rfl: 3     loperamide (IMODIUM A-D) 2 MG tablet, 2 caplets after loose stool then 1 after each loose stool do not exceed 4 in 24hrs, Disp: , Rfl:      loratadine (CLARITIN) 10 MG tablet, TAKE ONE TABLET BY MOUTH EVERY MORNING, Disp: 28 tablet, Rfl: 10     omeprazole (PRILOSEC) 20 MG DR capsule, Take 1 capsule (20 mg) by mouth daily, Disp: 28 capsule, Rfl: 10     ondansetron (ZOFRAN ODT) 4 MG ODT tab, Take 1-2 tablets (4-8 mg) by mouth every 6 hours as needed for nausea, Disp: 10 tablet, Rfl: 0     QUEtiapine (SEROQUEL) 25 MG tablet, Take 25 mg by mouth 1 tablet 4 times daily as needed for agitation or hallucinations, Disp: , Rfl:     Allergies  Allergies   Allergen Reactions     Acetaminophen Other (See Comments)     pt previously tried to overdose on it, PMD does not want pt taking per pt.      Latex Rash       Social History  Social History     Socioeconomic History     Marital status: Single     Number of children: 0   Tobacco Use     Smoking status: Never     Passive exposure: Yes     Smokeless tobacco: Current   Vaping Use     Vaping Use: Former   Substance and Sexual Activity     Alcohol use: No     Drug use: No     Sexual activity: Yes     Partners: Female     Birth control/protection: I.U.D.     Comment: Both male and female    Other Topics Concern     Parent/sibling w/ CABG, MI or angioplasty before 65F 55M? No       Family History  Family History   Problem Relation Age of Onset     Respiratory Mother         ASTHMA     Allergies Mother      Depression Mother      Chronic Obstructive Pulmonary Disease Mother      Anxiety Disorder Mother      Mental Illness Mother       Asthma Mother      Heart Disease Father         HEART VALVE REPLACEMENT     Hypertension Father      Diabetes Father      Depression Father      Anxiety Disorder Father      Mental Illness Father      Obesity Father      Respiratory Sister      Allergies Sister      Depression Sister      Respiratory Sister      Allergies Sister      Depression Sister      Respiratory Brother      Allergies Brother      Kidney Disease No family hx of        Review of Systems  As per HPI and PMHx, otherwise 10 system review including the head and neck, constitutional, eyes, respiratory, GI, skin, neurologic, lymphatic, endocrine, and allergy systems is negative.    Physical Exam  /73   Pulse 80   Temp 98.4  F (36.9  C) (Tympanic)   GENERAL: Patient is a pleasant, cooperative 37 year old female in no acute distress.  HEAD: Normocephalic, atraumatic.  Hair and scalp are normal.  EYES: Pupils are equal, round, reactive to light and accommodation.  Extraocular movements are intact.  The sclera nonicteric without injection.  The extraocular structures are normal.  EARS: See below.   NEUROLOGIC: Cranial nerves II through XII are grossly intact.  Voice is strong.  Facial nerve examination incomplete due to the patient wearing a mask.  CARDIOVASCULAR: Extremities are warm and well-perfused.  No significant peripheral edema.  RESPIRATORY: Patient has nonlabored breathing without cough, wheeze, stridor.  PSYCHIATRIC: Patient is alert and oriented.  Mood and affect appear normal.  SKIN: Warm and dry.  No scalp, face, or neck lesions noted.     Physical Exam and Procedure     EARS: Normal shape and symmetry.  No tenderness when palpating the mastoid or tragal areas bilaterally.  The ears were then examined under the otic binocular microscope to perform cerumen removal.  Otoscopic exam on the right reveals moist ceruminous debris and otorrhea impacted down to the level of the tympanic membrane.  There is granulation of the tympanic  membrane centrally.  The ear was debrided with a #3 and #5 suction.  Some crusting was removed with an alligator forceps. The right tympanic membrane is intact with severe retraction onto the middle ear structures.  There are no retraction pockets or evidence of cholesteatoma.  Ciprodex drops were instilled in the ear.     Attention was turned to the left ear.  Otoscopic exam on the left reveals impacted moist ceruminous debris and otorrhea down to the level of the tympanic membrane.  There is granulation at the posterior margin of the annulus.  There is debrided with a #3 #5 suction.  Some crusting was removed with an alligator forceps.  The left tympanic membrane is intact with severe retraction anteriorly onto the middle ear structures.  There are no retraction pockets or evidence of cholesteatoma.  Ciprodex drops were instilled in the ear.    Assessment and Plan     ICD-10-CM    1. Granuloma of tympanic membrane, bilateral  H71.13       2. Otorrhea, bilateral  H92.13       3. Chronic Eustachian tube dysfunction, bilateral  H69.83       4. Retracted tympanic membrane, bilateral  H73.893       5. Bilateral impacted cerumen  H61.23 Remove Cerumen         It was my pleasure seeing Divina Delgadillo today in clinic.  The patient has obvious bilateral chronic middle ear disease.  She has evidence of otitis externa and otorrhea bilaterally.  She has tympanic membrane granulation bilaterally.  I think we will treat her with Ciprodex drops 5 drops to both ears twice a day for 2 weeks.  I will have her come back and see me 3-4 weeks after finishing the drops.   I would like to see how much she improves after drop treatment.  The patient knows to contact me if she is having any problems or concerns.     We discussed a trial of ear powder to see if that would help keep moisture out of the ears and resolve her symptoms more quickly. We discussed water precautions for the ear and not placing anything in the ear except for  eardrops.    Migue Antunez MD  Department of Otolaryngology-Head and Neck Surgery  [facility]

## 2022-11-18 ENCOUNTER — OFFICE VISIT (OUTPATIENT)
Dept: OTOLARYNGOLOGY | Facility: CLINIC | Age: 37
End: 2022-11-18
Payer: COMMERCIAL

## 2022-11-18 VITALS — TEMPERATURE: 98.4 F | SYSTOLIC BLOOD PRESSURE: 135 MMHG | DIASTOLIC BLOOD PRESSURE: 73 MMHG | HEART RATE: 80 BPM

## 2022-11-18 DIAGNOSIS — H92.13 OTORRHEA, BILATERAL: ICD-10-CM

## 2022-11-18 DIAGNOSIS — E11.42 TYPE 2 DIABETES MELLITUS WITH DIABETIC POLYNEUROPATHY (H): ICD-10-CM

## 2022-11-18 DIAGNOSIS — H71.13 GRANULOMA OF TYMPANIC MEMBRANE, BILATERAL: Primary | ICD-10-CM

## 2022-11-18 DIAGNOSIS — H61.23 BILATERAL IMPACTED CERUMEN: ICD-10-CM

## 2022-11-18 DIAGNOSIS — H69.93 CHRONIC EUSTACHIAN TUBE DYSFUNCTION, BILATERAL: ICD-10-CM

## 2022-11-18 DIAGNOSIS — H73.893 RETRACTED TYMPANIC MEMBRANE, BILATERAL: ICD-10-CM

## 2022-11-18 PROCEDURE — 99214 OFFICE O/P EST MOD 30 MIN: CPT | Mod: 25 | Performed by: OTOLARYNGOLOGY

## 2022-11-18 PROCEDURE — 69210 REMOVE IMPACTED EAR WAX UNI: CPT | Performed by: OTOLARYNGOLOGY

## 2022-11-18 ASSESSMENT — PAIN SCALES - GENERAL: PAINLEVEL: NO PAIN (0)

## 2022-11-18 NOTE — LETTER
11/18/2022         RE: Divina Delgadillo  [id number] [facility] [id number]        Dear Colleague,    Thank you for referring your patient, Divina Delgadillo, to the [facility]. Please see a copy of my visit note below.    Chief Complaint   Patient presents with     RECHECK     History of Present Illness  Divina Delgadillo is a 37 year old female who presents today for follow-up. The patient  has a history of otitis externa with granulation of the tympanic membrane and recurrent drainage.  In July 2021, I treated her with a longer course of steroid eardrops with resolution of her symptoms.  She does have some retraction of the tympanic membrane but no deep retraction pockets or evidence of cholesteatoma.   I last evaluated the patient on 2/11/2022.  She was being treated with eardrops for bilateral otitis externa with granulation and drainage that resolved nicely after treatment.     Since last seeing the patient, the patient reports of a week history of ear plugging, fullness, drainage.  She feels like the hearing is slightly down in both ears.  She does intermittently have some problems of dizziness and imbalance usually during the day.  She has had ear tubes as a child but no other recent ear surgery.  No significant noise exposure history, heavy machinery, farm equipment, firearm use.  She does have intermittent tinnitus.     I did review her temporal bone CT from 11/26/2018.  There was a small soft tissue density in right Prussic's space with some significant retraction of the right tympanic membrane.  There did not really appear to be any significant erosion of the scutum.  There is opacification of the right mastoid air cells.  On the left, the tympanic membrane was severely retracted.  There is no middle ear opacification, opacification of thoracic space, or scutal erosion.  The mastoid was under aerated on the left.    Past Medical History  Patient Active Problem List    Diagnosis     Esophageal reflux     NAFL (nonalcoholic fatty liver)     Essential hypertension     Hyperlipidemia LDL goal <100     Stage 3 chronic kidney disease     Mucinous neoplasm of pancreas     Type 2 diabetes mellitus with stage 3 chronic kidney disease, with long-term current use of insulin (H)     Bipolar disorder (H)     Cervical high risk HPV (human papillomavirus) test positive     IUD (intrauterine device) in place     Morbid obesity (H)     Mild intermittent asthma with acute exacerbation     Nicotine abuse     Chronic leukocytosis     Acquired asplenia     Borderline personality disorder (H)     Asthma     PTSD (post-traumatic stress disorder)     Long term (current) use of anticoagulants     Orthostatic hypotension     Tobacco abuse     Vitamin D insufficiency     Depressive disorder     Schizoaffective disorder, depressive type (H)     Uncontrolled type 2 diabetes mellitus with diabetic polyneuropathy, with long-term current use of insulin     Cardiac murmur     Type 2 diabetes mellitus (H)     History of DVT of arm  (deep vein thrombosis)     Insomnia, unspecified type     Ulnar neuropathy of both upper extremities     Current Medications    Current Outpatient Medications:      ACCU-CHEK GUIDE test strip, Use to test blood sugar 3 times daily or as directed., Disp: 150 strip, Rfl: 1     acetaminophen (TYLENOL) 325 MG tablet, Take 325-650 mg by mouth 2 tab every 4-6 hours as needed, do not exceed 9 tabs in 24hours, Disp: , Rfl:      albuterol (VENTOLIN HFA) 108 (90 Base) MCG/ACT inhaler, INHALE 2 PUFFS INTO THE LUNGS EVERY SIX  HOURS AS NEEDED FOR SHORTNESS OF BREATH, Disp: 18 g, Rfl: 10     amLODIPine (NORVASC) 5 MG tablet, Take 1 tablet (5 mg) by mouth daily (with dinner), Disp: 90 tablet, Rfl: 0     ARIPiprazole (ABILIFY) 30 MG tablet, Take 30 mg by mouth daily, Disp: , Rfl:      atorvastatin (LIPITOR) 10 MG tablet, Take 1 tablet (10 mg) by mouth daily, Disp: 90 tablet, Rfl: 3     Biotin  (BIOTIN FORTE) 5 MG TABS, Take 1 tablet by mouth daily, Disp: 60 tablet, Rfl: 1     blood glucose (ACCU-CHEK GUIDE) test strip, TEST BLOOD SUGAR THREE TIMES DAILY, Disp: 100 strip, Rfl: 1     blood glucose monitoring (ACCU-CHEK CARLITOS PLUS) meter device kit, Use to test blood sugar three times daily or as directed., Disp: 1 kit, Rfl: 0     blood glucose monitoring (ACCU-CHEK FASTCLIX) lancets, Use to test blood sugar 3 times daily, Disp: 102 each, Rfl: 3     blood glucose monitoring (NO BRAND SPECIFIED) meter device kit, Use to test blood sugar 3 times daily or as directed.  Accu Chek Guide Meter., Disp: 1 kit, Rfl: 1     carvedilol (COREG) 6.25 MG tablet, Take 1 tablet (6.25 mg) by mouth 2 times daily, Disp: 60 tablet, Rfl: 3     ciprofloxacin-dexamethasone (CIPRODEX) 0.3-0.1 % otic suspension, Place 5 drops in draining ear twice daily for 10 days.  Call if drainage persistent past 10 days., Disp: 10 mL, Rfl: 3     cloZAPine (CLOZARIL) 100 MG tablet, Take 100 mg by mouth 2 times daily , Disp: , Rfl:      Continuous Blood Gluc Sensor (DEXCOM G6 SENSOR) MISC, 1 each every 10 days. Change every 10 days. USE TO READ BLOOD SUGARS PER  INSTRUCTIONS, Disp: 9 each, Rfl: 1     Continuous Blood Gluc Transmit (DEXCOM G6 TRANSMITTER) MISC, 1 each every 3 months Change every 3 months. USE TO READ BLOOD SUGARS PER  INSTRUCTIONS, Disp: 1 each, Rfl: 1     fluocinolone acetonide 0.01 % OIL, Place 4 drops in ear(s) 2 times daily as needed (ear itching), Disp: 20 mL, Rfl: 3     FLUoxetine (PROZAC) 40 MG capsule, Take 40 mg by mouth daily, Disp: , Rfl:      glucose (BD GLUCOSE) 4 g chewable tablet, Take 1 tablet by mouth every hour as needed for low blood sugar, Disp: 30 tablet, Rfl: 4     Insulin Aspart FlexPen 100 UNIT/ML SOPN, Inject 1-14 Units Subcutaneous 3 times daily (with meals) (Use Novlog based on meal size small meal 15-30gm carb: 3 units, average meal 45-60gm carb: 5 units, large meal 60gm+ carb: 7  units + pre meal correction 1:40>150.  Max dose per 24 hours is 50 units), Disp: 15 mL, Rfl: 0     insulin pen needle (32G X 6 MM) 32G X 6 MM miscellaneous, Use 4 pen needles daily., Disp: 150 each, Rfl: 10     insulin pen needle (ULTICARE MINI) 31G X 6 MM miscellaneous, USE 1 PEN NEEDLE DAILY, Disp: 100 each, Rfl: 10     lamoTRIgine (LAMICTAL) 100 MG tablet, Take 100 mg by mouth At Bedtime, Disp: , Rfl:      LANTUS SOLOSTAR 100 UNIT/ML soln, Inject 29 Units Subcutaneous every morning, Disp: 15 mL, Rfl: 1     lisinopril (ZESTRIL) 5 MG tablet, Take 1 tablet (5 mg) by mouth daily, Disp: 90 tablet, Rfl: 3     loperamide (IMODIUM A-D) 2 MG tablet, 2 caplets after loose stool then 1 after each loose stool do not exceed 4 in 24hrs, Disp: , Rfl:      loratadine (CLARITIN) 10 MG tablet, TAKE ONE TABLET BY MOUTH EVERY MORNING, Disp: 28 tablet, Rfl: 10     omeprazole (PRILOSEC) 20 MG DR capsule, Take 1 capsule (20 mg) by mouth daily, Disp: 28 capsule, Rfl: 10     ondansetron (ZOFRAN ODT) 4 MG ODT tab, Take 1-2 tablets (4-8 mg) by mouth every 6 hours as needed for nausea, Disp: 10 tablet, Rfl: 0     QUEtiapine (SEROQUEL) 25 MG tablet, Take 25 mg by mouth 1 tablet 4 times daily as needed for agitation or hallucinations, Disp: , Rfl:     Allergies  Allergies   Allergen Reactions     Acetaminophen Other (See Comments)     pt previously tried to overdose on it, PMD does not want pt taking per pt.      Latex Rash       Social History  Social History     Socioeconomic History     Marital status: Single     Number of children: 0   Tobacco Use     Smoking status: Never     Passive exposure: Yes     Smokeless tobacco: Current   Vaping Use     Vaping Use: Former   Substance and Sexual Activity     Alcohol use: No     Drug use: No     Sexual activity: Yes     Partners: Female     Birth control/protection: I.U.D.     Comment: Both male and female    Other Topics Concern     Parent/sibling w/ CABG, MI or angioplasty before 65F 55M? No        Family History  Family History   Problem Relation Age of Onset     Respiratory Mother         ASTHMA     Allergies Mother      Depression Mother      Chronic Obstructive Pulmonary Disease Mother      Anxiety Disorder Mother      Mental Illness Mother      Asthma Mother      Heart Disease Father         HEART VALVE REPLACEMENT     Hypertension Father      Diabetes Father      Depression Father      Anxiety Disorder Father      Mental Illness Father      Obesity Father      Respiratory Sister      Allergies Sister      Depression Sister      Respiratory Sister      Allergies Sister      Depression Sister      Respiratory Brother      Allergies Brother      Kidney Disease No family hx of        Review of Systems  As per HPI and PMHx, otherwise 10 system review including the head and neck, constitutional, eyes, respiratory, GI, skin, neurologic, lymphatic, endocrine, and allergy systems is negative.    Physical Exam  /73   Pulse 80   Temp 98.4  F (36.9  C) (Tympanic)   GENERAL: Patient is a pleasant, cooperative 37 year old female in no acute distress.  HEAD: Normocephalic, atraumatic.  Hair and scalp are normal.  EYES: Pupils are equal, round, reactive to light and accommodation.  Extraocular movements are intact.  The sclera nonicteric without injection.  The extraocular structures are normal.  EARS: See below.   NEUROLOGIC: Cranial nerves II through XII are grossly intact.  Voice is strong.  Facial nerve examination incomplete due to the patient wearing a mask.  CARDIOVASCULAR: Extremities are warm and well-perfused.  No significant peripheral edema.  RESPIRATORY: Patient has nonlabored breathing without cough, wheeze, stridor.  PSYCHIATRIC: Patient is alert and oriented.  Mood and affect appear normal.  SKIN: Warm and dry.  No scalp, face, or neck lesions noted.     Physical Exam and Procedure     EARS: Normal shape and symmetry.  No tenderness when palpating the mastoid or tragal areas bilaterally.   The ears were then examined under the otic binocular microscope to perform cerumen removal.  Otoscopic exam on the right reveals moist ceruminous debris and otorrhea impacted down to the level of the tympanic membrane.  There is granulation of the tympanic membrane centrally.  The ear was debrided with a #3 and #5 suction.  Some crusting was removed with an alligator forceps. The right tympanic membrane is intact with severe retraction onto the middle ear structures.  There are no retraction pockets or evidence of cholesteatoma.  Ciprodex drops were instilled in the ear.     Attention was turned to the left ear.  Otoscopic exam on the left reveals impacted moist ceruminous debris and otorrhea down to the level of the tympanic membrane.  There is granulation at the posterior margin of the annulus.  There is debrided with a #3 #5 suction.  Some crusting was removed with an alligator forceps.  The left tympanic membrane is intact with severe retraction anteriorly onto the middle ear structures.  There are no retraction pockets or evidence of cholesteatoma.  Ciprodex drops were instilled in the ear.    Assessment and Plan     ICD-10-CM    1. Granuloma of tympanic membrane, bilateral  H71.13       2. Otorrhea, bilateral  H92.13       3. Chronic Eustachian tube dysfunction, bilateral  H69.83       4. Retracted tympanic membrane, bilateral  H73.893       5. Bilateral impacted cerumen  H61.23 Remove Cerumen         It was my pleasure seeing Divina Delgadillo today in clinic.  The patient has obvious bilateral chronic middle ear disease.  She has evidence of otitis externa and otorrhea bilaterally.  She has tympanic membrane granulation bilaterally.  I think we will treat her with Ciprodex drops 5 drops to both ears twice a day for 2 weeks.  I will have her come back and see me 3-4 weeks after finishing the drops.   I would like to see how much she improves after drop treatment.  The patient knows to contact me if she is  having any problems or concerns.     We discussed a trial of ear powder to see if that would help keep moisture out of the ears and resolve her symptoms more quickly. We discussed water precautions for the ear and not placing anything in the ear except for eardrops.    Migue Antunez MD  Department of Otolaryngology-Head and Neck Surgery  [facility]         Again, thank you for allowing me to participate in the care of your patient.        Sincerely,        Migue Antunez MD

## 2022-11-18 NOTE — NURSING NOTE
"Initial /73   Pulse 80   Temp 98.4  F (36.9  C) (Tympanic)  Estimated body mass index is 38.24 kg/m  as calculated from the following:    Height as of 10/25/22: 1.626 m (5' 4\").    Weight as of 10/25/22: 101.1 kg (222 lb 12.8 oz). .    Ramona Zimmer LPN on 11/18/2022 at 8:53 AM    "

## 2022-11-19 ENCOUNTER — MYC MEDICAL ADVICE (OUTPATIENT)
Dept: FAMILY MEDICINE | Facility: CLINIC | Age: 37
End: 2022-11-19

## 2022-11-21 RX ORDER — PROCHLORPERAZINE 25 MG/1
1 SUPPOSITORY RECTAL
Refills: 1 | OUTPATIENT
Start: 2022-11-21

## 2022-11-25 DIAGNOSIS — N18.30 STAGE 3 CHRONIC KIDNEY DISEASE (H): Primary | ICD-10-CM

## 2022-11-28 ENCOUNTER — LAB (OUTPATIENT)
Dept: LAB | Facility: CLINIC | Age: 37
End: 2022-11-28
Payer: COMMERCIAL

## 2022-11-28 DIAGNOSIS — F20.9 SCHIZOPHRENIA, UNSPECIFIED TYPE (H): Primary | ICD-10-CM

## 2022-11-28 LAB
BASOPHILS # BLD AUTO: 0.1 10E3/UL (ref 0–0.2)
BASOPHILS NFR BLD AUTO: 1 %
EOSINOPHIL # BLD AUTO: 0.2 10E3/UL (ref 0–0.7)
EOSINOPHIL NFR BLD AUTO: 1 %
ERYTHROCYTE [DISTWIDTH] IN BLOOD BY AUTOMATED COUNT: 13.8 % (ref 10–15)
HCT VFR BLD AUTO: 37.5 % (ref 35–47)
HGB BLD-MCNC: 11.6 G/DL (ref 11.7–15.7)
IMM GRANULOCYTES # BLD: 0.1 10E3/UL
IMM GRANULOCYTES NFR BLD: 1 %
LYMPHOCYTES # BLD AUTO: 4.4 10E3/UL (ref 0.8–5.3)
LYMPHOCYTES NFR BLD AUTO: 33 %
MCH RBC QN AUTO: 30.1 PG (ref 26.5–33)
MCHC RBC AUTO-ENTMCNC: 30.9 G/DL (ref 31.5–36.5)
MCV RBC AUTO: 97 FL (ref 78–100)
MONOCYTES # BLD AUTO: 1.2 10E3/UL (ref 0–1.3)
MONOCYTES NFR BLD AUTO: 9 %
NEUTROPHILS # BLD AUTO: 7.4 10E3/UL (ref 1.6–8.3)
NEUTROPHILS NFR BLD AUTO: 55 %
PLATELET # BLD AUTO: 445 10E3/UL (ref 150–450)
RBC # BLD AUTO: 3.86 10E6/UL (ref 3.8–5.2)
WBC # BLD AUTO: 13.3 10E3/UL (ref 4–11)

## 2022-11-28 PROCEDURE — 85025 COMPLETE CBC W/AUTO DIFF WBC: CPT

## 2022-11-28 PROCEDURE — 36415 COLL VENOUS BLD VENIPUNCTURE: CPT

## 2022-11-29 DIAGNOSIS — J45.21 MILD INTERMITTENT ASTHMA WITH ACUTE EXACERBATION: ICD-10-CM

## 2022-11-29 RX ORDER — MONTELUKAST SODIUM 10 MG/1
TABLET ORAL
Qty: 28 TABLET | Refills: 0 | OUTPATIENT
Start: 2022-11-29

## 2022-12-04 NOTE — PROGRESS NOTES
Chief Complaint   Patient presents with     RECHECK     Ears after using ciprodex- today no pain or drainage noted     History of Present Illness  Divina Delgadillo is a 37 year old female who presents today for follow-up. The patient  has a history of otitis externa with granulation of the tympanic membrane and recurrent drainage.  In July 2021, I treated her with a longer course of steroid eardrops with resolution of her symptoms.  She does have some retraction of the tympanic membrane but no deep retraction pockets or evidence of cholesteatoma.   I last evaluated the patient on 11/18/2022 again with bilateral granulation and otitis externa.  I placed her on a course of Ciprodex drops.  The patient returns today for examination and debridement.    Since last seeing the patient, the patient reports sniffing interval improvement in her ears.  She is no longer having any ear fullness or otorrhea. She does intermittently have some problems of dizziness and imbalance usually during the day.  She has had ear tubes as a child but no other recent ear surgery.  No significant noise exposure history, heavy machinery, farm equipment, firearm use.  She does have intermittent tinnitus.     I did review her temporal bone CT from 11/26/2018.  There was a small soft tissue density in right Prussic's space with some significant retraction of the right tympanic membrane.  There did not really appear to be any significant erosion of the scutum.  There is opacification of the right mastoid air cells.  On the left, the tympanic membrane was severely retracted.  There is no middle ear opacification, opacification of thoracic space, or scutal erosion.  The mastoid was under aerated on the left.    Past Medical History  Patient Active Problem List   Diagnosis     Esophageal reflux     NAFL (nonalcoholic fatty liver)     Essential hypertension     Hyperlipidemia LDL goal <100     Stage 3 chronic kidney disease     Mucinous neoplasm of  pancreas     Type 2 diabetes mellitus with stage 3 chronic kidney disease, with long-term current use of insulin (H)     Bipolar disorder (H)     Cervical high risk HPV (human papillomavirus) test positive     IUD (intrauterine device) in place     Morbid obesity (H)     Mild intermittent asthma with acute exacerbation     Nicotine abuse     Chronic leukocytosis     Acquired asplenia     Borderline personality disorder (H)     Asthma     PTSD (post-traumatic stress disorder)     Long term (current) use of anticoagulants     Orthostatic hypotension     Tobacco abuse     Vitamin D insufficiency     Depressive disorder     Schizoaffective disorder, depressive type (H)     Uncontrolled type 2 diabetes mellitus with diabetic polyneuropathy, with long-term current use of insulin     Cardiac murmur     Type 2 diabetes mellitus (H)     History of DVT of arm  (deep vein thrombosis)     Insomnia, unspecified type     Ulnar neuropathy of both upper extremities     Current Medications    Current Outpatient Medications:      ACCU-CHEK GUIDE test strip, Use to test blood sugar 3 times daily or as directed., Disp: 150 strip, Rfl: 1     acetaminophen (TYLENOL) 325 MG tablet, Take 325-650 mg by mouth 2 tab every 4-6 hours as needed, do not exceed 9 tabs in 24hours, Disp: , Rfl:      albuterol (VENTOLIN HFA) 108 (90 Base) MCG/ACT inhaler, INHALE 2 PUFFS INTO THE LUNGS EVERY SIX  HOURS AS NEEDED FOR SHORTNESS OF BREATH, Disp: 18 g, Rfl: 10     amLODIPine (NORVASC) 5 MG tablet, Take 1 tablet (5 mg) by mouth daily (with dinner), Disp: 90 tablet, Rfl: 0     ARIPiprazole (ABILIFY) 30 MG tablet, Take 30 mg by mouth daily, Disp: , Rfl:      atorvastatin (LIPITOR) 10 MG tablet, Take 1 tablet (10 mg) by mouth daily, Disp: 90 tablet, Rfl: 3     Biotin (BIOTIN FORTE) 5 MG TABS, Take 1 tablet by mouth daily, Disp: 60 tablet, Rfl: 1     blood glucose (ACCU-CHEK GUIDE) test strip, TEST BLOOD SUGAR THREE TIMES DAILY, Disp: 100 strip, Rfl: 1      blood glucose monitoring (ACCU-CHEK CARLITOS PLUS) meter device kit, Use to test blood sugar three times daily or as directed., Disp: 1 kit, Rfl: 0     blood glucose monitoring (ACCU-CHEK FASTCLIX) lancets, Use to test blood sugar 3 times daily, Disp: 102 each, Rfl: 11     blood glucose monitoring (NO BRAND SPECIFIED) meter device kit, Use to test blood sugar 3 times daily or as directed.  Accu Chek Guide Meter., Disp: 1 kit, Rfl: 1     carvedilol (COREG) 6.25 MG tablet, Take 1 tablet (6.25 mg) by mouth 2 times daily, Disp: 60 tablet, Rfl: 3     ciprofloxacin-dexamethasone (CIPRODEX) 0.3-0.1 % otic suspension, Place 5 drops in draining ear twice daily for 10 days.  Call if drainage persistent past 10 days., Disp: 10 mL, Rfl: 3     ciprofloxacin-dexamethasone (CIPRODEX) 0.3-0.1 % otic suspension, Place 5 drops in draining ear twice daily for 10 days.  Call if drainage persistent past 10 days., Disp: 10 mL, Rfl: 3     cloZAPine (CLOZARIL) 100 MG tablet, Take 100 mg by mouth 2 times daily , Disp: , Rfl:      Continuous Blood Gluc Sensor (DEXCOM G6 SENSOR) MISC, 1 each every 10 days. Change every 10 days. USE TO READ BLOOD SUGARS PER  INSTRUCTIONS, Disp: 9 each, Rfl: 1     Continuous Blood Gluc Transmit (DEXCOM G6 TRANSMITTER) MISC, 1 each every 3 months Change every 3 months. USE TO READ BLOOD SUGARS PER  INSTRUCTIONS, Disp: 1 each, Rfl: 1     fluocinolone acetonide 0.01 % OIL, Place 4 drops in ear(s) 2 times daily as needed (ear itching), Disp: 20 mL, Rfl: 3     FLUoxetine (PROZAC) 40 MG capsule, Take 40 mg by mouth daily, Disp: , Rfl:      glucose (BD GLUCOSE) 4 g chewable tablet, Take 1 tablet by mouth every hour as needed for low blood sugar, Disp: 30 tablet, Rfl: 4     Insulin Aspart FlexPen 100 UNIT/ML SOPN, Inject 1-14 Units Subcutaneous 3 times daily (with meals) (Use Novlog on meal size small meal 15-30gm carb: 3 units, average meal 45-60gm carb: 5 units, large meal 60gm+ carb: 7 units +  pre meal correction 1:40>150. Max dose per 24 hours is 50 units), Disp: 15 mL, Rfl: 0     insulin pen needle (32G X 6 MM) 32G X 6 MM miscellaneous, Use 4 pen needles daily., Disp: 150 each, Rfl: 10     insulin pen needle (ULTICARE MINI) 31G X 6 MM miscellaneous, USE 1 PEN NEEDLE DAILY, Disp: 100 each, Rfl: 10     lamoTRIgine (LAMICTAL) 100 MG tablet, Take 100 mg by mouth At Bedtime, Disp: , Rfl:      LANTUS SOLOSTAR 100 UNIT/ML soln, Inject 29 Units Subcutaneous every morning, Disp: 15 mL, Rfl: 1     lisinopril (ZESTRIL) 5 MG tablet, Take 1 tablet (5 mg) by mouth daily, Disp: 90 tablet, Rfl: 3     loperamide (IMODIUM A-D) 2 MG tablet, 2 caplets after loose stool then 1 after each loose stool do not exceed 4 in 24hrs, Disp: , Rfl:      loratadine (CLARITIN) 10 MG tablet, TAKE ONE TABLET BY MOUTH EVERY MORNING, Disp: 28 tablet, Rfl: 10     omeprazole (PRILOSEC) 20 MG DR capsule, Take 1 capsule (20 mg) by mouth daily, Disp: 28 capsule, Rfl: 10     ondansetron (ZOFRAN ODT) 4 MG ODT tab, Take 1-2 tablets (4-8 mg) by mouth every 6 hours as needed for nausea, Disp: 10 tablet, Rfl: 0     QUEtiapine (SEROQUEL) 25 MG tablet, Take 25 mg by mouth 1 tablet 4 times daily as needed for agitation or hallucinations, Disp: , Rfl:     Allergies  Allergies   Allergen Reactions     Acetaminophen Other (See Comments)     pt previously tried to overdose on it, PMD does not want pt taking per pt.      Latex Rash       Social History  Social History     Socioeconomic History     Marital status: Single     Number of children: 0   Tobacco Use     Smoking status: Never     Passive exposure: Yes     Smokeless tobacco: Current   Vaping Use     Vaping Use: Former   Substance and Sexual Activity     Alcohol use: No     Drug use: No     Sexual activity: Yes     Partners: Female     Birth control/protection: I.U.D.     Comment: Both male and female    Other Topics Concern     Parent/sibling w/ CABG, MI or angioplasty before 65F 55M? No       Family  History  Family History   Problem Relation Age of Onset     Respiratory Mother         ASTHMA     Allergies Mother      Depression Mother      Chronic Obstructive Pulmonary Disease Mother      Anxiety Disorder Mother      Mental Illness Mother      Asthma Mother      Heart Disease Father         HEART VALVE REPLACEMENT     Hypertension Father      Diabetes Father      Depression Father      Anxiety Disorder Father      Mental Illness Father      Obesity Father      Respiratory Sister      Allergies Sister      Depression Sister      Respiratory Sister      Allergies Sister      Depression Sister      Respiratory Brother      Allergies Brother      Kidney Disease No family hx of        Review of Systems  As per HPI and PMHx, otherwise 10 system review including the head and neck, constitutional, eyes, respiratory, GI, skin, neurologic, lymphatic, endocrine, and allergy systems is negative.    Physical Exam  /69 (BP Location: Right arm, Patient Position: Chair, Cuff Size: Adult Regular)   Pulse 86   Temp 98.4  F (36.9  C) (Tympanic)   Wt 101.2 kg (223 lb)   SpO2 98%   BMI 38.28 kg/m    GENERAL: Patient is a pleasant, cooperative 37 year old female in no acute distress.  HEAD: Normocephalic, atraumatic.  Hair and scalp are normal.  EYES: Pupils are equal, round, reactive to light and accommodation.  Extraocular movements are intact.  The sclera nonicteric without injection.  The extraocular structures are normal.  EARS: See below.   NEUROLOGIC: Cranial nerves II through XII are grossly intact.  Voice is strong.  Facial nerve examination incomplete due to the patient wearing a mask.  CARDIOVASCULAR: Extremities are warm and well-perfused.  No significant peripheral edema.  RESPIRATORY: Patient has nonlabored breathing without cough, wheeze, stridor.  PSYCHIATRIC: Patient is alert and oriented.  Mood and affect appear normal.  SKIN: Warm and dry.  No scalp, face, or neck lesions noted.     Physical Exam and  Procedure     EARS: Normal shape and symmetry.  No tenderness when palpating the mastoid or tragal areas bilaterally.  The ears were then examined under the otic binocular microscope.  Otoscopic exam on the right reveals a mostly clear canal.  The right tympanic membrane is intact with some slight attic retraction.  No deep retraction pockets.  In the posterior-inferior quadrant, there is a slight amount of granularity still the tympanic membrane without reji granulation tissue or active drainage.  No significant tympanic membrane retraction or evidence of cholesteatoma.        Attention was turned to the left ear.  Otoscopic exam on the left reveals a mostly clear canal with some slight dry cerumen.  The granulation tissue has resolved.  There is no drainage or active otorrhea.  The left tympanic membrane is intact with moderately severe severe retraction anteriorly onto the middle ear structures, slight attic retraction without deep retraction pockets or evidence of cholesteatoma.    Assessment and Plan     ICD-10-CM    1. History of acute otitis externa  Z86.69 ciprofloxacin-dexamethasone (CIPRODEX) 0.3-0.1 % otic suspension     Microscopy, Binocular     CANCELED: Microscopy, Binocular     CANCELED: Remove Cerumen      2. Chronic Eustachian tube dysfunction, bilateral  H69.83 ciprofloxacin-dexamethasone (CIPRODEX) 0.3-0.1 % otic suspension     Microscopy, Binocular     CANCELED: Microscopy, Binocular     CANCELED: Remove Cerumen      3. Retracted tympanic membrane, bilateral  H73.893 ciprofloxacin-dexamethasone (CIPRODEX) 0.3-0.1 % otic suspension     Microscopy, Binocular     CANCELED: Microscopy, Binocular     CANCELED: Remove Cerumen      4. Tobacco dependence syndrome  F17.200 ciprofloxacin-dexamethasone (CIPRODEX) 0.3-0.1 % otic suspension     Microscopy, Binocular     CANCELED: Microscopy, Binocular     CANCELED: Remove Cerumen      5. Encounter for tobacco use cessation counseling  Z71.6  ciprofloxacin-dexamethasone (CIPRODEX) 0.3-0.1 % otic suspension     Microscopy, Binocular     CANCELED: Microscopy, Binocular     CANCELED: Remove Cerumen         It was my pleasure seeing Divina Delgadillo today in clinic. Overall, the patient's ears appear much improved.  She still has a little bit of granularity on the right-hand side.    In the future, we could consider a trial of ear powder to see if that would help keep moisture out of the ears and resolve her symptoms more quickly.    I would like to see her back in 4 to 6 months to recheck the ears or sooner if her symptoms warrant.  We discussed water precautions for the ear and not placing anything in the ear except for eardrops.  I did send an additional prescription for eardrops to her pharmacy that she can have on hand and start if needed.    Migue Antunez MD  Department of Otolaryngology-Head and Neck Surgery  [facility]

## 2022-12-05 ENCOUNTER — OFFICE VISIT (OUTPATIENT)
Dept: OTOLARYNGOLOGY | Facility: CLINIC | Age: 37
End: 2022-12-05
Payer: COMMERCIAL

## 2022-12-05 VITALS
HEART RATE: 86 BPM | TEMPERATURE: 98.4 F | OXYGEN SATURATION: 98 % | SYSTOLIC BLOOD PRESSURE: 120 MMHG | DIASTOLIC BLOOD PRESSURE: 69 MMHG | BODY MASS INDEX: 38.28 KG/M2 | WEIGHT: 223 LBS

## 2022-12-05 DIAGNOSIS — Z86.69 HISTORY OF ACUTE OTITIS EXTERNA: Primary | ICD-10-CM

## 2022-12-05 DIAGNOSIS — F17.200 TOBACCO DEPENDENCE SYNDROME: ICD-10-CM

## 2022-12-05 DIAGNOSIS — H73.893 RETRACTED TYMPANIC MEMBRANE, BILATERAL: ICD-10-CM

## 2022-12-05 DIAGNOSIS — Z71.6 ENCOUNTER FOR TOBACCO USE CESSATION COUNSELING: ICD-10-CM

## 2022-12-05 DIAGNOSIS — H69.93 CHRONIC EUSTACHIAN TUBE DYSFUNCTION, BILATERAL: ICD-10-CM

## 2022-12-05 PROCEDURE — 99213 OFFICE O/P EST LOW 20 MIN: CPT | Performed by: OTOLARYNGOLOGY

## 2022-12-05 RX ORDER — CIPROFLOXACIN AND DEXAMETHASONE 3; 1 MG/ML; MG/ML
SUSPENSION/ DROPS AURICULAR (OTIC)
Qty: 10 ML | Refills: 3 | Status: SHIPPED | OUTPATIENT
Start: 2022-12-05 | End: 2023-01-11

## 2022-12-05 ASSESSMENT — PAIN SCALES - GENERAL: PAINLEVEL: NO PAIN (0)

## 2022-12-05 NOTE — NURSING NOTE
"Initial /69 (BP Location: Right arm, Patient Position: Chair, Cuff Size: Adult Regular)   Pulse 86   Temp 98.4  F (36.9  C) (Tympanic)   Wt 101.2 kg (223 lb)   SpO2 98%   BMI 38.28 kg/m   Estimated body mass index is 38.28 kg/m  as calculated from the following:    Height as of 10/25/22: 1.626 m (5' 4\").    Weight as of this encounter: 101.2 kg (223 lb). .    June Burroughs LPN    "

## 2022-12-05 NOTE — LETTER
12/5/2022         RE: Divina Delgadillo  [id number] [facility] [address]        Dear Colleague,    Thank you for referring your patient, Divina Delgadillo, to the [facility]. Please see a copy of my visit note below.    Chief Complaint   Patient presents with     RECHECK     Ears after using ciprodex- today no pain or drainage noted     History of Present Illness  Divina Delgadillo is a 37 year old female who presents today for follow-up. The patient  has a history of otitis externa with granulation of the tympanic membrane and recurrent drainage.  In July 2021, I treated her with a longer course of steroid eardrops with resolution of her symptoms.  She does have some retraction of the tympanic membrane but no deep retraction pockets or evidence of cholesteatoma.   I last evaluated the patient on 11/18/2022 again with bilateral granulation and otitis externa.  I placed her on a course of Ciprodex drops.  The patient returns today for examination and debridement.    Since last seeing the patient, the patient reports sniffing interval improvement in her ears.  She is no longer having any ear fullness or otorrhea. She does intermittently have some problems of dizziness and imbalance usually during the day.  She has had ear tubes as a child but no other recent ear surgery.  No significant noise exposure history, heavy machinery, farm equipment, firearm use.  She does have intermittent tinnitus.     I did review her temporal bone CT from 11/26/2018.  There was a small soft tissue density in right Prussic's space with some significant retraction of the right tympanic membrane.  There did not really appear to be any significant erosion of the scutum.  There is opacification of the right mastoid air cells.  On the left, the tympanic membrane was severely retracted.  There is no middle ear opacification, opacification of thoracic space, or scutal erosion.  The mastoid was under aerated on  the left.    Past Medical History  Patient Active Problem List   Diagnosis     Esophageal reflux     NAFL (nonalcoholic fatty liver)     Essential hypertension     Hyperlipidemia LDL goal <100     Stage 3 chronic kidney disease     Mucinous neoplasm of pancreas     Type 2 diabetes mellitus with stage 3 chronic kidney disease, with long-term current use of insulin (H)     Bipolar disorder (H)     Cervical high risk HPV (human papillomavirus) test positive     IUD (intrauterine device) in place     Morbid obesity (H)     Mild intermittent asthma with acute exacerbation     Nicotine abuse     Chronic leukocytosis     Acquired asplenia     Borderline personality disorder (H)     Asthma     PTSD (post-traumatic stress disorder)     Long term (current) use of anticoagulants     Orthostatic hypotension     Tobacco abuse     Vitamin D insufficiency     Depressive disorder     Schizoaffective disorder, depressive type (H)     Uncontrolled type 2 diabetes mellitus with diabetic polyneuropathy, with long-term current use of insulin     Cardiac murmur     Type 2 diabetes mellitus (H)     History of DVT of arm  (deep vein thrombosis)     Insomnia, unspecified type     Ulnar neuropathy of both upper extremities     Current Medications    Current Outpatient Medications:      ACCU-CHEK GUIDE test strip, Use to test blood sugar 3 times daily or as directed., Disp: 150 strip, Rfl: 1     acetaminophen (TYLENOL) 325 MG tablet, Take 325-650 mg by mouth 2 tab every 4-6 hours as needed, do not exceed 9 tabs in 24hours, Disp: , Rfl:      albuterol (VENTOLIN HFA) 108 (90 Base) MCG/ACT inhaler, INHALE 2 PUFFS INTO THE LUNGS EVERY SIX  HOURS AS NEEDED FOR SHORTNESS OF BREATH, Disp: 18 g, Rfl: 10     amLODIPine (NORVASC) 5 MG tablet, Take 1 tablet (5 mg) by mouth daily (with dinner), Disp: 90 tablet, Rfl: 0     ARIPiprazole (ABILIFY) 30 MG tablet, Take 30 mg by mouth daily, Disp: , Rfl:      atorvastatin (LIPITOR) 10 MG tablet, Take 1 tablet  (10 mg) by mouth daily, Disp: 90 tablet, Rfl: 3     Biotin (BIOTIN FORTE) 5 MG TABS, Take 1 tablet by mouth daily, Disp: 60 tablet, Rfl: 1     blood glucose (ACCU-CHEK GUIDE) test strip, TEST BLOOD SUGAR THREE TIMES DAILY, Disp: 100 strip, Rfl: 1     blood glucose monitoring (ACCU-CHEK CARLITOS PLUS) meter device kit, Use to test blood sugar three times daily or as directed., Disp: 1 kit, Rfl: 0     blood glucose monitoring (ACCU-CHEK FASTCLIX) lancets, Use to test blood sugar 3 times daily, Disp: 102 each, Rfl: 11     blood glucose monitoring (NO BRAND SPECIFIED) meter device kit, Use to test blood sugar 3 times daily or as directed.  Accu Chek Guide Meter., Disp: 1 kit, Rfl: 1     carvedilol (COREG) 6.25 MG tablet, Take 1 tablet (6.25 mg) by mouth 2 times daily, Disp: 60 tablet, Rfl: 3     ciprofloxacin-dexamethasone (CIPRODEX) 0.3-0.1 % otic suspension, Place 5 drops in draining ear twice daily for 10 days.  Call if drainage persistent past 10 days., Disp: 10 mL, Rfl: 3     ciprofloxacin-dexamethasone (CIPRODEX) 0.3-0.1 % otic suspension, Place 5 drops in draining ear twice daily for 10 days.  Call if drainage persistent past 10 days., Disp: 10 mL, Rfl: 3     cloZAPine (CLOZARIL) 100 MG tablet, Take 100 mg by mouth 2 times daily , Disp: , Rfl:      Continuous Blood Gluc Sensor (DEXCOM G6 SENSOR) MISC, 1 each every 10 days. Change every 10 days. USE TO READ BLOOD SUGARS PER  INSTRUCTIONS, Disp: 9 each, Rfl: 1     Continuous Blood Gluc Transmit (DEXCOM G6 TRANSMITTER) MISC, 1 each every 3 months Change every 3 months. USE TO READ BLOOD SUGARS PER  INSTRUCTIONS, Disp: 1 each, Rfl: 1     fluocinolone acetonide 0.01 % OIL, Place 4 drops in ear(s) 2 times daily as needed (ear itching), Disp: 20 mL, Rfl: 3     FLUoxetine (PROZAC) 40 MG capsule, Take 40 mg by mouth daily, Disp: , Rfl:      glucose (BD GLUCOSE) 4 g chewable tablet, Take 1 tablet by mouth every hour as needed for low blood sugar, Disp:  30 tablet, Rfl: 4     Insulin Aspart FlexPen 100 UNIT/ML SOPN, Inject 1-14 Units Subcutaneous 3 times daily (with meals) (Use Novlog on meal size small meal 15-30gm carb: 3 units, average meal 45-60gm carb: 5 units, large meal 60gm+ carb: 7 units + pre meal correction 1:40>150. Max dose per 24 hours is 50 units), Disp: 15 mL, Rfl: 0     insulin pen needle (32G X 6 MM) 32G X 6 MM miscellaneous, Use 4 pen needles daily., Disp: 150 each, Rfl: 10     insulin pen needle (ULTICARE MINI) 31G X 6 MM miscellaneous, USE 1 PEN NEEDLE DAILY, Disp: 100 each, Rfl: 10     lamoTRIgine (LAMICTAL) 100 MG tablet, Take 100 mg by mouth At Bedtime, Disp: , Rfl:      LANTUS SOLOSTAR 100 UNIT/ML soln, Inject 29 Units Subcutaneous every morning, Disp: 15 mL, Rfl: 1     lisinopril (ZESTRIL) 5 MG tablet, Take 1 tablet (5 mg) by mouth daily, Disp: 90 tablet, Rfl: 3     loperamide (IMODIUM A-D) 2 MG tablet, 2 caplets after loose stool then 1 after each loose stool do not exceed 4 in 24hrs, Disp: , Rfl:      loratadine (CLARITIN) 10 MG tablet, TAKE ONE TABLET BY MOUTH EVERY MORNING, Disp: 28 tablet, Rfl: 10     omeprazole (PRILOSEC) 20 MG DR capsule, Take 1 capsule (20 mg) by mouth daily, Disp: 28 capsule, Rfl: 10     ondansetron (ZOFRAN ODT) 4 MG ODT tab, Take 1-2 tablets (4-8 mg) by mouth every 6 hours as needed for nausea, Disp: 10 tablet, Rfl: 0     QUEtiapine (SEROQUEL) 25 MG tablet, Take 25 mg by mouth 1 tablet 4 times daily as needed for agitation or hallucinations, Disp: , Rfl:     Allergies  Allergies   Allergen Reactions     Acetaminophen Other (See Comments)     pt previously tried to overdose on it, PMD does not want pt taking per pt.      Latex Rash       Social History  Social History     Socioeconomic History     Marital status: Single     Number of children: 0   Tobacco Use     Smoking status: Never     Passive exposure: Yes     Smokeless tobacco: Current   Vaping Use     Vaping Use: Former   Substance and Sexual Activity      Alcohol use: No     Drug use: No     Sexual activity: Yes     Partners: Female     Birth control/protection: I.U.D.     Comment: Both male and female    Other Topics Concern     Parent/sibling w/ CABG, MI or angioplasty before 65F 55M? No       Family History  Family History   Problem Relation Age of Onset     Respiratory Mother         ASTHMA     Allergies Mother      Depression Mother      Chronic Obstructive Pulmonary Disease Mother      Anxiety Disorder Mother      Mental Illness Mother      Asthma Mother      Heart Disease Father         HEART VALVE REPLACEMENT     Hypertension Father      Diabetes Father      Depression Father      Anxiety Disorder Father      Mental Illness Father      Obesity Father      Respiratory Sister      Allergies Sister      Depression Sister      Respiratory Sister      Allergies Sister      Depression Sister      Respiratory Brother      Allergies Brother      Kidney Disease No family hx of        Review of Systems  As per HPI and PMHx, otherwise 10 system review including the head and neck, constitutional, eyes, respiratory, GI, skin, neurologic, lymphatic, endocrine, and allergy systems is negative.    Physical Exam  /69 (BP Location: Right arm, Patient Position: Chair, Cuff Size: Adult Regular)   Pulse 86   Temp 98.4  F (36.9  C) (Tympanic)   Wt 101.2 kg (223 lb)   SpO2 98%   BMI 38.28 kg/m    GENERAL: Patient is a pleasant, cooperative 37 year old female in no acute distress.  HEAD: Normocephalic, atraumatic.  Hair and scalp are normal.  EYES: Pupils are equal, round, reactive to light and accommodation.  Extraocular movements are intact.  The sclera nonicteric without injection.  The extraocular structures are normal.  EARS: See below.   NEUROLOGIC: Cranial nerves II through XII are grossly intact.  Voice is strong.  Facial nerve examination incomplete due to the patient wearing a mask.  CARDIOVASCULAR: Extremities are warm and well-perfused.  No significant  peripheral edema.  RESPIRATORY: Patient has nonlabored breathing without cough, wheeze, stridor.  PSYCHIATRIC: Patient is alert and oriented.  Mood and affect appear normal.  SKIN: Warm and dry.  No scalp, face, or neck lesions noted.     Physical Exam and Procedure     EARS: Normal shape and symmetry.  No tenderness when palpating the mastoid or tragal areas bilaterally.  The ears were then examined under the otic binocular microscope.  Otoscopic exam on the right reveals a mostly clear canal.  The right tympanic membrane is intact with some slight attic retraction.  No deep retraction pockets.  In the posterior-inferior quadrant, there is a slight amount of granularity still the tympanic membrane without reji granulation tissue or active drainage.  No significant tympanic membrane retraction or evidence of cholesteatoma.        Attention was turned to the left ear.  Otoscopic exam on the left reveals a mostly clear canal with some slight dry cerumen.  The granulation tissue has resolved.  There is no drainage or active otorrhea.  The left tympanic membrane is intact with moderately severe severe retraction anteriorly onto the middle ear structures, slight attic retraction without deep retraction pockets or evidence of cholesteatoma.    Assessment and Plan     ICD-10-CM    1. History of acute otitis externa  Z86.69 ciprofloxacin-dexamethasone (CIPRODEX) 0.3-0.1 % otic suspension     Microscopy, Binocular     CANCELED: Microscopy, Binocular     CANCELED: Remove Cerumen      2. Chronic Eustachian tube dysfunction, bilateral  H69.83 ciprofloxacin-dexamethasone (CIPRODEX) 0.3-0.1 % otic suspension     Microscopy, Binocular     CANCELED: Microscopy, Binocular     CANCELED: Remove Cerumen      3. Retracted tympanic membrane, bilateral  H73.893 ciprofloxacin-dexamethasone (CIPRODEX) 0.3-0.1 % otic suspension     Microscopy, Binocular     CANCELED: Microscopy, Binocular     CANCELED: Remove Cerumen      4. Tobacco  dependence syndrome  F17.200 ciprofloxacin-dexamethasone (CIPRODEX) 0.3-0.1 % otic suspension     Microscopy, Binocular     CANCELED: Microscopy, Binocular     CANCELED: Remove Cerumen      5. Encounter for tobacco use cessation counseling  Z71.6 ciprofloxacin-dexamethasone (CIPRODEX) 0.3-0.1 % otic suspension     Microscopy, Binocular     CANCELED: Microscopy, Binocular     CANCELED: Remove Cerumen         It was my pleasure seeing Divina Delgadillo today in clinic. Overall, the patient's ears appear much improved.  She still has a little bit of granularity on the right-hand side.    In the future, we could consider a trial of ear powder to see if that would help keep moisture out of the ears and resolve her symptoms more quickly.    I would like to see her back in 4 to 6 months to recheck the ears or sooner if her symptoms warrant.  We discussed water precautions for the ear and not placing anything in the ear except for eardrops.  I did send an additional prescription for eardrops to her pharmacy that she can have on hand and start if needed.    Migue Antunez MD  Department of Otolaryngology-Head and Neck Surgery  [facility]         Again, thank you for allowing me to participate in the care of your patient.        Sincerely,        Migue Antunez MD

## 2022-12-11 ENCOUNTER — MYC MEDICAL ADVICE (OUTPATIENT)
Dept: FAMILY MEDICINE | Facility: CLINIC | Age: 37
End: 2022-12-11

## 2022-12-14 NOTE — TELEPHONE ENCOUNTER
Attempted to call pt. No answer. Mailbox not set up. Sent mychart to call us for appt      Jose De Jesus Guido RN

## 2022-12-19 ENCOUNTER — OFFICE VISIT (OUTPATIENT)
Dept: NEPHROLOGY | Facility: CLINIC | Age: 37
End: 2022-12-19
Payer: COMMERCIAL

## 2022-12-19 ENCOUNTER — LAB (OUTPATIENT)
Dept: LAB | Facility: CLINIC | Age: 37
End: 2022-12-19
Payer: COMMERCIAL

## 2022-12-19 VITALS
BODY MASS INDEX: 39.81 KG/M2 | RESPIRATION RATE: 18 BRPM | SYSTOLIC BLOOD PRESSURE: 130 MMHG | HEART RATE: 84 BPM | OXYGEN SATURATION: 97 % | DIASTOLIC BLOOD PRESSURE: 61 MMHG | WEIGHT: 231.9 LBS

## 2022-12-19 DIAGNOSIS — I10 ESSENTIAL HYPERTENSION: Chronic | ICD-10-CM

## 2022-12-19 DIAGNOSIS — N18.30 STAGE 3 CHRONIC KIDNEY DISEASE (H): ICD-10-CM

## 2022-12-19 DIAGNOSIS — N18.31 TYPE 2 DIABETES MELLITUS WITH STAGE 3A CHRONIC KIDNEY DISEASE, WITH LONG-TERM CURRENT USE OF INSULIN (H): ICD-10-CM

## 2022-12-19 DIAGNOSIS — Z79.4 TYPE 2 DIABETES MELLITUS WITH STAGE 3A CHRONIC KIDNEY DISEASE, WITH LONG-TERM CURRENT USE OF INSULIN (H): ICD-10-CM

## 2022-12-19 DIAGNOSIS — N18.31 STAGE 3A CHRONIC KIDNEY DISEASE (H): Primary | ICD-10-CM

## 2022-12-19 DIAGNOSIS — E11.22 TYPE 2 DIABETES MELLITUS WITH STAGE 3A CHRONIC KIDNEY DISEASE, WITH LONG-TERM CURRENT USE OF INSULIN (H): ICD-10-CM

## 2022-12-19 DIAGNOSIS — N25.81 SECONDARY RENAL HYPERPARATHYROIDISM (H): ICD-10-CM

## 2022-12-19 LAB
ALBUMIN SERPL-MCNC: 3 G/DL (ref 3.4–5)
ANION GAP SERPL CALCULATED.3IONS-SCNC: 3 MMOL/L (ref 3–14)
BUN SERPL-MCNC: 19 MG/DL (ref 7–30)
CALCIUM SERPL-MCNC: 9.3 MG/DL (ref 8.5–10.1)
CHLORIDE BLD-SCNC: 112 MMOL/L (ref 94–109)
CO2 SERPL-SCNC: 26 MMOL/L (ref 20–32)
CREAT SERPL-MCNC: 1.24 MG/DL (ref 0.52–1.04)
GFR SERPL CREATININE-BSD FRML MDRD: 58 ML/MIN/1.73M2
GLUCOSE BLD-MCNC: 258 MG/DL (ref 70–99)
HGB BLD-MCNC: 11.3 G/DL (ref 11.7–15.7)
PHOSPHATE SERPL-MCNC: 3.6 MG/DL (ref 2.5–4.5)
POTASSIUM BLD-SCNC: 5.1 MMOL/L (ref 3.4–5.3)
PTH-INTACT SERPL-MCNC: 53 PG/ML (ref 15–65)
SODIUM SERPL-SCNC: 141 MMOL/L (ref 133–144)

## 2022-12-19 PROCEDURE — 99215 OFFICE O/P EST HI 40 MIN: CPT | Performed by: INTERNAL MEDICINE

## 2022-12-19 PROCEDURE — 80069 RENAL FUNCTION PANEL: CPT

## 2022-12-19 PROCEDURE — 85018 HEMOGLOBIN: CPT

## 2022-12-19 PROCEDURE — 84156 ASSAY OF PROTEIN URINE: CPT

## 2022-12-19 PROCEDURE — 83970 ASSAY OF PARATHORMONE: CPT

## 2022-12-19 PROCEDURE — 36415 COLL VENOUS BLD VENIPUNCTURE: CPT

## 2022-12-19 RX ORDER — CARVEDILOL 12.5 MG/1
12.5 TABLET ORAL 2 TIMES DAILY
Qty: 180 TABLET | Refills: 3 | Status: SHIPPED | OUTPATIENT
Start: 2022-12-19 | End: 2023-12-01

## 2022-12-19 ASSESSMENT — PAIN SCALES - GENERAL: PAINLEVEL: SEVERE PAIN (6)

## 2022-12-19 NOTE — NURSING NOTE
Divina Delgadillo's goals for this visit include: Headaches  She requests these members of her care team be copied on today's visit information: YES    PCP: Jaleesa Velasquez    Referring Provider:  No referring provider defined for this encounter.    BP (!) 157/72 (BP Location: Left arm, Patient Position: Sitting, Cuff Size: Adult Large)   Pulse 92   Resp 18   Wt 105.2 kg (231 lb 14.4 oz)   SpO2 97%   BMI 39.81 kg/m      Do you need any medication refills at today's visit? NONE    Tommy Lockhart, EMT

## 2022-12-19 NOTE — PATIENT INSTRUCTIONS
Repeat blood pressure before leaving today  Labs in 6 months  Follow-up in one year.   Stop the amlodipine  Increase carvedilol to 12.5 mg twice a day.

## 2022-12-19 NOTE — PROGRESS NOTES
12/19/22     CC: CKD Stage 3, HTN    HPI: Divina Delgadillo is a 37 year old female who presents for follow-up of CKD. To review, her hx is significant for diabetes (~ 20 years), bipolar disease - was on lithium therapy for around 6 years but since our initial visit, she has since been taken off of it. Her diabetes was previously very poorly controlled  9% in Nov 2020, now 7.8% in October 2021. Creatinine has been 1.20-1.35 in the past year; stable at 1.35  today. Her last UPCR was 0.14 g/g in December of 2020 - she is taking 5 mg of lisinopril currently.     2019 visit: Divina presents for routine follow-up today.  Her creatinine has been 1.23-3.47 in the past year.  Her baseline seems to be someplace between 1.2 and 1.6 and she is stable at 1.41 at this time.  At her last visit she was very motivated to exercise and have been attending the gym regularly.  Unfortunately she has gotten away from the exercise to some degree but is still motivated to continue to make healthy lifestyle changes.  Her blood pressure is well controlled and she has no swelling concerns.  She is not using NSAIDs.    12/15/20: video visit. Creatinine is stable at this time. 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 Some back pain in the AM -she attributes this to her bed. BP has been good at recent visit. No lightheadedness unless blood sugar is low. No NSAIDs. Eating and drinking well. She had her teeth pulled at the end of May. She is getting new teeth in January so she is excited about that.     12/20/21: Divina is presenting for routine follow-up today. Her creatinine has been 1.20-1.35 over the last year. Her baseline seems to be 1.20-1.40, stable at 1.35 today. At her last visit (virtual) she was doing well with her diabetes (diet and exercise). Still enjoys working at the [employer]. No new concerns today, overall doing well. Was able to quit smoking for two months, but did start again. Plans  to stop in the future, encouraged her to continue with the cessation.    12/19/22: in person visit. Creatinine has been 1.06-1.37; stable at 1.24 at this time. No proteinuria on her check from Dec 2021. She quit smoking this past July - congratulated her on this success. BP high today but otherwise has been 120-69. She denies difficulty with UTIs. On ROS she reports headache episodes that last for a few hours - this has been going on for a couple of months - no vision changes but she notices being in the dark helps them. She has follow-up with primary coming up to discuss this further. No difficulty with yeast infections. BP high today but was 120-69 on Dec 5th visit.     acetaminophen (TYLENOL) 325 MG tablet, Take 325-650 mg by mouth 2 tab every 4-6 hours as needed, do not exceed 9 tabs in 24hours  albuterol (VENTOLIN HFA) 108 (90 Base) MCG/ACT inhaler, INHALE 2 PUFFS INTO THE LUNGS EVERY SIX  HOURS AS NEEDED FOR SHORTNESS OF BREATH  amLODIPine (NORVASC) 5 MG tablet, Take 1 tablet (5 mg) by mouth daily (with dinner)  ARIPiprazole (ABILIFY) 30 MG tablet, Take 30 mg by mouth daily  atorvastatin (LIPITOR) 10 MG tablet, Take 1 tablet (10 mg) by mouth daily  Biotin (BIOTIN FORTE) 5 MG TABS, Take 1 tablet by mouth daily  carvedilol (COREG) 6.25 MG tablet, Take 1 tablet (6.25 mg) by mouth 2 times daily  cloZAPine (CLOZARIL) 100 MG tablet, Take 100 mg by mouth 2 times daily   Continuous Blood Gluc Sensor (DEXCOM G6 SENSOR) MISC, 1 each every 10 days. Change every 10 days. USE TO READ BLOOD SUGARS PER  INSTRUCTIONS  Continuous Blood Gluc Transmit (DEXCOM G6 TRANSMITTER) MISC, 1 each every 3 months Change every 3 months. USE TO READ BLOOD SUGARS PER  INSTRUCTIONS  fluocinolone acetonide 0.01 % OIL, Place 4 drops in ear(s) 2 times daily as needed (ear itching)  FLUoxetine (PROZAC) 40 MG capsule, Take 40 mg by mouth daily  glucose (BD GLUCOSE) 4 g chewable tablet, Take 1 tablet by mouth every hour as  needed for low blood sugar  Insulin Aspart FlexPen 100 UNIT/ML SOPN, Inject 1-14 Units Subcutaneous 3 times daily (with meals) (Use Novlog on meal size small meal 15-30gm carb: 3 units, average meal 45-60gm carb: 5 units, large meal 60gm+ carb: 7 units + pre meal correction 1:40>150. Max dose per 24 hours is 50 units)  insulin pen needle (32G X 6 MM) 32G X 6 MM miscellaneous, Use 4 pen needles daily.  insulin pen needle (ULTICARE MINI) 31G X 6 MM miscellaneous, USE 1 PEN NEEDLE DAILY  lamoTRIgine (LAMICTAL) 100 MG tablet, Take 100 mg by mouth At Bedtime  LANTUS SOLOSTAR 100 UNIT/ML soln, Inject 29 Units Subcutaneous every morning  lisinopril (ZESTRIL) 5 MG tablet, Take 1 tablet (5 mg) by mouth daily  loperamide (IMODIUM A-D) 2 MG tablet, 2 caplets after loose stool then 1 after each loose stool do not exceed 4 in 24hrs  loratadine (CLARITIN) 10 MG tablet, TAKE ONE TABLET BY MOUTH EVERY MORNING  omeprazole (PRILOSEC) 20 MG DR capsule, TAKE 1 CAPSULE BY MOUTH EVERY DAY  ondansetron (ZOFRAN ODT) 4 MG ODT tab, Take 1-2 tablets (4-8 mg) by mouth every 6 hours as needed for nausea  QUEtiapine (SEROQUEL) 25 MG tablet, Take 25 mg by mouth 1 tablet 4 times daily as needed for agitation or hallucinations  ACCU-CHEK GUIDE test strip, Use to test blood sugar 3 times daily or as directed. (Patient not taking: Reported on 12/19/2022)  blood glucose (ACCU-CHEK GUIDE) test strip, TEST BLOOD SUGAR THREE TIMES DAILY (Patient not taking: Reported on 12/19/2022)  blood glucose monitoring (ACCU-CHEK CARLITOS PLUS) meter device kit, Use to test blood sugar three times daily or as directed. (Patient not taking: Reported on 12/19/2022)  blood glucose monitoring (ACCU-CHEK FASTCLIX) lancets, Use to test blood sugar 3 times daily (Patient not taking: Reported on 12/19/2022)  blood glucose monitoring (NO BRAND SPECIFIED) meter device kit, Use to test blood sugar 3 times daily or as directed.  Accu Chek Guide Meter. (Patient not taking: Reported  on 12/19/2022)  ciprofloxacin-dexamethasone (CIPRODEX) 0.3-0.1 % otic suspension, Place 5 drops in draining ear twice daily for 10 days.  Call if drainage persistent past 10 days. (Patient not taking: Reported on 12/19/2022)  ciprofloxacin-dexamethasone (CIPRODEX) 0.3-0.1 % otic suspension, Place 5 drops in draining ear twice daily for 10 days.  Call if drainage persistent past 10 days. (Patient not taking: Reported on 12/19/2022)    No current facility-administered medications on file prior to visit.    Exam:  BP (!) 157/72 (BP Location: Left arm, Patient Position: Sitting, Cuff Size: Adult Large)   Pulse 92   Resp 18   Wt 105.2 kg (231 lb 14.4 oz)   SpO2 97%   BMI 39.81 kg/m    GENERAL: Healthy, alert and no distress    Results:  Lab on 12/19/2022   Component Date Value Ref Range Status     Hemoglobin 12/19/2022 11.3 (L)  11.7 - 15.7 g/dL Final     Sodium 12/19/2022 141  133 - 144 mmol/L Final     Potassium 12/19/2022 5.1  3.4 - 5.3 mmol/L Final     Chloride 12/19/2022 112 (H)  94 - 109 mmol/L Final     Carbon Dioxide (CO2) 12/19/2022 26  20 - 32 mmol/L Final     Anion Gap 12/19/2022 3  3 - 14 mmol/L Final     Urea Nitrogen 12/19/2022 19  7 - 30 mg/dL Final     Creatinine 12/19/2022 1.24 (H)  0.52 - 1.04 mg/dL Final     Calcium 12/19/2022 9.3  8.5 - 10.1 mg/dL Final     Glucose 12/19/2022 258 (H)  70 - 99 mg/dL Final     Albumin 12/19/2022 3.0 (L)  3.4 - 5.0 g/dL Final     Phosphorus 12/19/2022 3.6  2.5 - 4.5 mg/dL Final     GFR Estimate 12/19/2022 58 (L)  >60 mL/min/1.73m2 Final    Effective December 21, 2021 eGFRcr in adults is calculated using the 2021 CKD-EPI creatinine equation which includes age and gender (Valentina et al., NEJM, DOI: 10.1056/HWOZtx1607295)          Assessment/Plan:   1. CKD Stage 3: risk factors for kidney disease include longstanding hypertension, diabetes, as well as longterm previous lithium use. She is now away from lithium which is great to see given she is already at risk for  diabetic nephropathy and would like to minimize risk.  She is on a low-dose of lisinopril.  Limited in our ability to increase the lisinopril given hyperkalemia hx. Last UPCR was normal - in the setting of proteinuria, I recommend SGTL2 inhibitor.     2. DM: Her A1c at this time is 8.5%  - stress the importance of good diabetes control. Low threshold to add SGLT2 inhibitor, however, given normal proteinuria, will defer to her diabetes care team.     3. Bipolar: now off of lithium    4. GERD: ideally would avoid PPI therapy given increased risk of incident CKD and AIN noted with PPI therapy. Tolerating once daily dosing - I am not certain we will be able to get away from PPI completely given the severity of her sxs previously.     5. Hypertension:above goal of <130/80 - will stop amlodipine and instead increase carvedilol to 12.5 mg BID.     Patient Instructions   1. Repeat blood pressure before leaving today  2. Labs in 6 months  3. Follow-up in one year.   4. Stop the amlodipine  5. Increase carvedilol to 12.5 mg twice a day.    54 minutes spent on the date of the encounter doing chart review, review of test results, interpretation of tests, patient visit and documentation     Sánchez Dias, DO

## 2022-12-20 LAB
ALBUMIN MFR UR ELPH: 38.1 MG/DL
CREAT UR-MCNC: 194 MG/DL
PROT/CREAT 24H UR: 0.2 MG/MG CR (ref 0–0.2)

## 2022-12-21 ENCOUNTER — MYC MEDICAL ADVICE (OUTPATIENT)
Dept: FAMILY MEDICINE | Facility: CLINIC | Age: 37
End: 2022-12-21

## 2022-12-21 ENCOUNTER — OFFICE VISIT (OUTPATIENT)
Dept: FAMILY MEDICINE | Facility: CLINIC | Age: 37
End: 2022-12-21
Payer: COMMERCIAL

## 2022-12-21 VITALS
SYSTOLIC BLOOD PRESSURE: 139 MMHG | DIASTOLIC BLOOD PRESSURE: 60 MMHG | HEIGHT: 64 IN | TEMPERATURE: 99.4 F | WEIGHT: 231 LBS | OXYGEN SATURATION: 98 % | RESPIRATION RATE: 16 BRPM | BODY MASS INDEX: 39.44 KG/M2 | HEART RATE: 90 BPM

## 2022-12-21 DIAGNOSIS — F20.9 SCHIZOPHRENIA, UNSPECIFIED TYPE (H): ICD-10-CM

## 2022-12-21 DIAGNOSIS — N18.32 TYPE 2 DIABETES MELLITUS WITH STAGE 3B CHRONIC KIDNEY DISEASE, WITH LONG-TERM CURRENT USE OF INSULIN (H): Primary | ICD-10-CM

## 2022-12-21 DIAGNOSIS — Z79.4 TYPE 2 DIABETES MELLITUS WITH STAGE 3B CHRONIC KIDNEY DISEASE, WITH LONG-TERM CURRENT USE OF INSULIN (H): Primary | ICD-10-CM

## 2022-12-21 DIAGNOSIS — G43.009 MIGRAINE WITHOUT AURA AND WITHOUT STATUS MIGRAINOSUS, NOT INTRACTABLE: ICD-10-CM

## 2022-12-21 DIAGNOSIS — E11.22 TYPE 2 DIABETES MELLITUS WITH STAGE 3B CHRONIC KIDNEY DISEASE, WITH LONG-TERM CURRENT USE OF INSULIN (H): Primary | ICD-10-CM

## 2022-12-21 LAB
ANION GAP SERPL CALCULATED.3IONS-SCNC: 8 MMOL/L (ref 7–15)
BASOPHILS # BLD AUTO: 0.1 10E3/UL (ref 0–0.2)
BASOPHILS NFR BLD AUTO: 1 %
BUN SERPL-MCNC: 16.8 MG/DL (ref 6–20)
CALCIUM SERPL-MCNC: 9.3 MG/DL (ref 8.6–10)
CHLORIDE SERPL-SCNC: 109 MMOL/L (ref 98–107)
CHOLEST SERPL-MCNC: 175 MG/DL
CREAT SERPL-MCNC: 1.4 MG/DL (ref 0.51–0.95)
DEPRECATED HCO3 PLAS-SCNC: 24 MMOL/L (ref 22–29)
EOSINOPHIL # BLD AUTO: 0.5 10E3/UL (ref 0–0.7)
EOSINOPHIL NFR BLD AUTO: 3 %
ERYTHROCYTE [DISTWIDTH] IN BLOOD BY AUTOMATED COUNT: 13.6 % (ref 10–15)
GFR SERPL CREATININE-BSD FRML MDRD: 50 ML/MIN/1.73M2
GLUCOSE SERPL-MCNC: 83 MG/DL (ref 70–99)
HBA1C MFR BLD: 8.6 % (ref 0–5.6)
HCT VFR BLD AUTO: 36.2 % (ref 35–47)
HDLC SERPL-MCNC: 72 MG/DL
HGB BLD-MCNC: 11.1 G/DL (ref 11.7–15.7)
IMM GRANULOCYTES # BLD: 0.2 10E3/UL
IMM GRANULOCYTES NFR BLD: 1 %
LDLC SERPL CALC-MCNC: 81 MG/DL
LYMPHOCYTES # BLD AUTO: 3.5 10E3/UL (ref 0.8–5.3)
LYMPHOCYTES NFR BLD AUTO: 24 %
MCH RBC QN AUTO: 29.8 PG (ref 26.5–33)
MCHC RBC AUTO-ENTMCNC: 30.7 G/DL (ref 31.5–36.5)
MCV RBC AUTO: 97 FL (ref 78–100)
MONOCYTES # BLD AUTO: 0.8 10E3/UL (ref 0–1.3)
MONOCYTES NFR BLD AUTO: 5 %
NEUTROPHILS # BLD AUTO: 9.7 10E3/UL (ref 1.6–8.3)
NEUTROPHILS NFR BLD AUTO: 66 %
NONHDLC SERPL-MCNC: 103 MG/DL
NRBC # BLD AUTO: 0 10E3/UL
NRBC BLD AUTO-RTO: 0 /100
PLATELET # BLD AUTO: 448 10E3/UL (ref 150–450)
POTASSIUM SERPL-SCNC: 4.9 MMOL/L (ref 3.4–5.3)
RBC # BLD AUTO: 3.73 10E6/UL (ref 3.8–5.2)
SODIUM SERPL-SCNC: 141 MMOL/L (ref 136–145)
TRIGL SERPL-MCNC: 108 MG/DL
WBC # BLD AUTO: 14.7 10E3/UL (ref 4–11)

## 2022-12-21 PROCEDURE — 85025 COMPLETE CBC W/AUTO DIFF WBC: CPT | Performed by: NURSE PRACTITIONER

## 2022-12-21 PROCEDURE — 99214 OFFICE O/P EST MOD 30 MIN: CPT | Performed by: NURSE PRACTITIONER

## 2022-12-21 PROCEDURE — 36415 COLL VENOUS BLD VENIPUNCTURE: CPT | Performed by: NURSE PRACTITIONER

## 2022-12-21 PROCEDURE — 83036 HEMOGLOBIN GLYCOSYLATED A1C: CPT | Performed by: NURSE PRACTITIONER

## 2022-12-21 PROCEDURE — 80061 LIPID PANEL: CPT | Performed by: NURSE PRACTITIONER

## 2022-12-21 PROCEDURE — 80048 BASIC METABOLIC PNL TOTAL CA: CPT | Performed by: NURSE PRACTITIONER

## 2022-12-21 RX ORDER — FLURBIPROFEN SODIUM 0.3 MG/ML
SOLUTION/ DROPS OPHTHALMIC
Qty: 100 EACH | Refills: 10 | Status: SHIPPED | OUTPATIENT
Start: 2022-12-21 | End: 2023-07-14

## 2022-12-21 RX ORDER — TOPIRAMATE 25 MG/1
25 TABLET, FILM COATED ORAL 2 TIMES DAILY
Qty: 60 TABLET | Refills: 1 | Status: SHIPPED | OUTPATIENT
Start: 2022-12-21 | End: 2023-02-15

## 2022-12-21 RX ORDER — INSULIN GLARGINE 100 [IU]/ML
32 INJECTION, SOLUTION SUBCUTANEOUS EVERY MORNING
Qty: 15 ML | Refills: 1 | Status: SHIPPED | OUTPATIENT
Start: 2022-12-21 | End: 2023-01-02

## 2022-12-21 ASSESSMENT — PAIN SCALES - GENERAL: PAINLEVEL: NO PAIN (0)

## 2022-12-21 NOTE — PATIENT INSTRUCTIONS
Start Topamax 25 mg twice daily     Let me know if no improvement in 1 week     Tylenol 500 mg 4 times daily as needed     Increase Lantus to 32 units daily AM

## 2022-12-21 NOTE — PROGRESS NOTES
Assessment & Plan     Type 2 diabetes mellitus with stage 3b chronic kidney disease, with long-term current use of insulin (H)  -uncontrolled, patient reports that blood sugars running sometimes in 400', she is trying to eat healthy and following diabetic diet   - Adult Eye  Referral; Future  - REVIEW OF HEALTH MAINTENANCE PROTOCOL ORDERS  -recommended to increase Lantus to 32 units daily AM   -follow up with diabetic educator, patient is currently using dexcom CGM  - insulin glargine (LANTUS SOLOSTAR) 100 UNIT/ML pen; Inject 32 Units Subcutaneous every morning  - insulin pen needle (ULTICARE MINI) 31G X 6 MM miscellaneous; USE 1 PEN NEEDLE DAILY  - Basic metabolic panel  (Ca, Cl, CO2, Creat, Gluc, K, Na, BUN); Future  - Hemoglobin A1c; Future  - Lipid panel reflex to direct LDL Fasting; Future  - Lipid panel reflex to direct LDL Fasting  - Hemoglobin A1c  - Basic metabolic panel  (Ca, Cl, CO2, Creat, Gluc, K, Na, BUN)    Migraine without aura and without status migrainosus, not intractable    - topiramate (TOPAMAX) 25 MG tablet; Take 1 tablet (25 mg) by mouth 2 times daily    Schizophrenia, unspecified type (H)  -following with psychiatrist   - CBC with Platelets & Differential                 Return in about 3 months (around 3/21/2023).    VERONIQUE Spears [facility]    Rhiannon Murcia is a 37 year old patient presenting for the following health issues: diabetes follow up and headaches   Diabetes and Migraine      History of Present Illness       Diabetes:   She presents for follow up of diabetes.  She is checking home blood glucose with a continuous glucose monitor.  She checks blood glucose before meals, after meals and at bedtime.  Blood glucose is sometimes over 200 and sometimes under 70. She is aware of hypoglycemia symptoms including shakiness, dizziness, weakness and lethargy. She is concerned about blood sugar frequently over 200. She is having numbness in  "feet, burning in feet, excessive thirst and weight gain. The patient has not had a diabetic eye exam in the last 12 months.         Headaches:   Since the patient's last clinic visit, headaches are: worsened  The patient is getting headaches:  4or5 times a day  She is not able to do normal daily activities when she has a migraine.  The patient is taking the following rescue/relief medications:  No rescue/relief medications   Patient states \"I get no relief\" from the rescue/relief medications.   The patient is taking the following medications to prevent migraines:  No medications to prevent migraines  In the past 4 weeks, the patient has gone to an Urgent Care or Emergency Room 0 times times due to headaches.    She eats 0-1 servings of fruits and vegetables daily.She consumes 2 sweetened beverage(s) daily.She exercises with enough effort to increase her heart rate 9 or less minutes per day.  She exercises with enough effort to increase her heart rate 3 or less days per week.   She is taking medications regularly.           Review of Systems   Constitutional, HEENT, cardiovascular, pulmonary, gi and gu systems are negative, except as otherwise noted.      Objective    /60 (BP Location: Left arm, Patient Position: Sitting, Cuff Size: Adult Large)   Pulse 90   Temp 99.4  F (37.4  C) (Tympanic)   Resp 16   Ht 1.626 m (5' 4\")   Wt 104.8 kg (231 lb)   SpO2 98%   BMI 39.65 kg/m    Body mass index is 39.65 kg/m .  Physical Exam   GENERAL: healthy, alert and no distress  EYES: Eyes grossly normal to inspection, PERRL and conjunctivae and sclerae normal  RESP: lungs clear to auscultation - no rales, rhonchi or wheezes  CV: regular rate and rhythm, normal S1 S2, no S3 or S4, no murmur, click or rub, no peripheral edema and peripheral pulses strong  NEURO: Normal strength and tone, mentation intact and speech normal  PSYCH: mentation appears normal, affect normal/bright                    "

## 2022-12-22 ENCOUNTER — NURSE TRIAGE (OUTPATIENT)
Dept: NURSING | Facility: CLINIC | Age: 37
End: 2022-12-22

## 2022-12-23 ENCOUNTER — MYC MEDICAL ADVICE (OUTPATIENT)
Dept: FAMILY MEDICINE | Facility: CLINIC | Age: 37
End: 2022-12-23

## 2022-12-23 NOTE — TELEPHONE ENCOUNTER
"S: \" I have a migraine\"  B:  Saw PCP on 12/21 was started on Topamax for migraine.  Has had only 2 pills.  Symptoms are:    Migraine pain in back of head and forehead     Dizzy    Nauseated (no emesis)    \"Throbbing pain\"    Rate pain as severe    Taken Tylenol 100 mg (these pills are from when she lived at the group home)    Has used cold cloth to forehead    A: Per protocol can care for self at home.  Writer advised to increase Tylenol to 325 mg 2 tablets every 8 hours. Had patient writer down Tylenol dosing instructions.    R: Protocol and care advice reviewed. Patient verbalized understanding, in agreement with plan, and voiced no further questions. Call back with any new or worsening symptoms, concerns, or questions.    Phyllis Thomas RN, MA  [facility] Triage Nurse Advisor    Reason for Disposition    [1] MODERATE headache (e.g., interferes with normal activities) AND [2] present > 24 hours AND [3] unexplained  (Exceptions: analgesics not tried, typical migraine, or headache part of viral illness)    Additional Information    Negative: Difficult to awaken or acting confused (e.g., disoriented, slurred speech)    Negative: [1] Weakness of the face, arm or leg on one side of the body AND [2] new-onset    Negative: [1] Numbness of the face, arm or leg on one side of the body AND [2] new-onset    Negative: [1] Loss of speech or garbled speech AND [2] new-onset    Negative: Passed out (i.e., lost consciousness, collapsed and was not responding)    Negative: Sounds like a life-threatening emergency to the triager    Negative: Unable to walk, or can only walk with assistance (e.g., requires support)    Negative: Stiff neck (can't touch chin to chest)    Negative: Severe pain in one eye    Negative: [1] Other family members (or roommates) with headaches AND [2] possibility of carbon monoxide exposure    Negative: [1] SEVERE headache (e.g., excruciating) AND [2] \"worst headache\" of life    Negative: [1] SEVERE " headache AND [2] sudden-onset (i.e., reaching maximum intensity within seconds to 1 hour)    Negative: [1] SEVERE headache AND [2] fever    Negative: Loss of vision or double vision (Exception: same as prior migraines)    Negative: [1] Fever > 100.0 F (37.8 C) AND [2] diabetes mellitus or weak immune system (e.g., HIV positive, cancer chemo, splenectomy, organ transplant, chronic steroids)    Negative: Patient sounds very sick or weak to the triager    Negative: [1] SEVERE headache (e.g., excruciating) AND [2] not improved after 2 hours of pain medicine    Negative: [1] Vomiting AND [2] 2 or more times (Exception: similar to previous migraines)    Negative: Fever > 104 F (40 C)    Protocols used: HEADACHE-A-AH

## 2022-12-29 ENCOUNTER — MYC MEDICAL ADVICE (OUTPATIENT)
Dept: FAMILY MEDICINE | Facility: CLINIC | Age: 37
End: 2022-12-29

## 2022-12-29 DIAGNOSIS — Z79.4 TYPE 2 DIABETES MELLITUS WITH DIABETIC POLYNEUROPATHY, WITH LONG-TERM CURRENT USE OF INSULIN (H): ICD-10-CM

## 2022-12-29 DIAGNOSIS — E11.42 TYPE 2 DIABETES MELLITUS WITH DIABETIC POLYNEUROPATHY, WITH LONG-TERM CURRENT USE OF INSULIN (H): ICD-10-CM

## 2022-12-30 RX ORDER — INSULIN ASPART 100 [IU]/ML
1-14 INJECTION, SOLUTION INTRAVENOUS; SUBCUTANEOUS
Qty: 30 ML | Refills: 1 | Status: SHIPPED | OUTPATIENT
Start: 2022-12-30 | End: 2023-03-30

## 2022-12-30 NOTE — TELEPHONE ENCOUNTER
Forwarding MyChart refill request for Novolog to PCP due to issues with getting sig to transfer over from last Rx.     Mer Arthur RN  [facility]

## 2023-01-02 ENCOUNTER — OFFICE VISIT (OUTPATIENT)
Dept: FAMILY MEDICINE | Facility: CLINIC | Age: 38
End: 2023-01-02
Payer: COMMERCIAL

## 2023-01-02 VITALS
WEIGHT: 230.2 LBS | OXYGEN SATURATION: 98 % | HEIGHT: 66 IN | SYSTOLIC BLOOD PRESSURE: 128 MMHG | HEART RATE: 79 BPM | TEMPERATURE: 98.1 F | BODY MASS INDEX: 37 KG/M2 | RESPIRATION RATE: 16 BRPM | DIASTOLIC BLOOD PRESSURE: 48 MMHG

## 2023-01-02 DIAGNOSIS — K92.1 BLACK STOOL: ICD-10-CM

## 2023-01-02 DIAGNOSIS — N94.89 OTHER SPECIFIED CONDITIONS ASSOCIATED WITH FEMALE GENITAL ORGANS AND MENSTRUAL CYCLE: ICD-10-CM

## 2023-01-02 DIAGNOSIS — R10.812 LEFT UPPER QUADRANT ABDOMINAL TENDERNESS WITHOUT REBOUND TENDERNESS: ICD-10-CM

## 2023-01-02 DIAGNOSIS — N92.6 MISSED PERIOD: ICD-10-CM

## 2023-01-02 DIAGNOSIS — Z79.4 TYPE 2 DIABETES MELLITUS WITH STAGE 3B CHRONIC KIDNEY DISEASE, WITH LONG-TERM CURRENT USE OF INSULIN (H): ICD-10-CM

## 2023-01-02 DIAGNOSIS — D72.829 LEUKOCYTOSIS, UNSPECIFIED TYPE: ICD-10-CM

## 2023-01-02 DIAGNOSIS — K52.9 CHRONIC DIARRHEA: Primary | ICD-10-CM

## 2023-01-02 DIAGNOSIS — E11.22 TYPE 2 DIABETES MELLITUS WITH STAGE 3B CHRONIC KIDNEY DISEASE, WITH LONG-TERM CURRENT USE OF INSULIN (H): ICD-10-CM

## 2023-01-02 DIAGNOSIS — N18.32 TYPE 2 DIABETES MELLITUS WITH STAGE 3B CHRONIC KIDNEY DISEASE, WITH LONG-TERM CURRENT USE OF INSULIN (H): ICD-10-CM

## 2023-01-02 LAB
ANION GAP SERPL CALCULATED.3IONS-SCNC: 10 MMOL/L (ref 7–15)
BUN SERPL-MCNC: 21.8 MG/DL (ref 6–20)
CALCIUM SERPL-MCNC: 9.1 MG/DL (ref 8.6–10)
CHLORIDE SERPL-SCNC: 110 MMOL/L (ref 98–107)
CREAT SERPL-MCNC: 1.42 MG/DL (ref 0.51–0.95)
DEPRECATED HCO3 PLAS-SCNC: 20 MMOL/L (ref 22–29)
ERYTHROCYTE [DISTWIDTH] IN BLOOD BY AUTOMATED COUNT: 13.5 % (ref 10–15)
GFR SERPL CREATININE-BSD FRML MDRD: 49 ML/MIN/1.73M2
GLUCOSE SERPL-MCNC: 236 MG/DL (ref 70–99)
HCT VFR BLD AUTO: 37.3 % (ref 35–47)
HGB BLD-MCNC: 11.4 G/DL (ref 11.7–15.7)
MCH RBC QN AUTO: 30.3 PG (ref 26.5–33)
MCHC RBC AUTO-ENTMCNC: 30.6 G/DL (ref 31.5–36.5)
MCV RBC AUTO: 99 FL (ref 78–100)
PLATELET # BLD AUTO: 437 10E3/UL (ref 150–450)
POTASSIUM SERPL-SCNC: 5 MMOL/L (ref 3.4–5.3)
RBC # BLD AUTO: 3.76 10E6/UL (ref 3.8–5.2)
SODIUM SERPL-SCNC: 140 MMOL/L (ref 136–145)
WBC # BLD AUTO: 23.9 10E3/UL (ref 4–11)

## 2023-01-02 PROCEDURE — 83690 ASSAY OF LIPASE: CPT | Performed by: STUDENT IN AN ORGANIZED HEALTH CARE EDUCATION/TRAINING PROGRAM

## 2023-01-02 PROCEDURE — 86364 TISS TRNSGLTMNASE EA IG CLAS: CPT | Performed by: STUDENT IN AN ORGANIZED HEALTH CARE EDUCATION/TRAINING PROGRAM

## 2023-01-02 PROCEDURE — 36415 COLL VENOUS BLD VENIPUNCTURE: CPT | Performed by: STUDENT IN AN ORGANIZED HEALTH CARE EDUCATION/TRAINING PROGRAM

## 2023-01-02 PROCEDURE — 82784 ASSAY IGA/IGD/IGG/IGM EACH: CPT | Performed by: STUDENT IN AN ORGANIZED HEALTH CARE EDUCATION/TRAINING PROGRAM

## 2023-01-02 PROCEDURE — 85027 COMPLETE CBC AUTOMATED: CPT | Performed by: STUDENT IN AN ORGANIZED HEALTH CARE EDUCATION/TRAINING PROGRAM

## 2023-01-02 PROCEDURE — 99214 OFFICE O/P EST MOD 30 MIN: CPT | Performed by: STUDENT IN AN ORGANIZED HEALTH CARE EDUCATION/TRAINING PROGRAM

## 2023-01-02 PROCEDURE — 80048 BASIC METABOLIC PNL TOTAL CA: CPT | Performed by: STUDENT IN AN ORGANIZED HEALTH CARE EDUCATION/TRAINING PROGRAM

## 2023-01-02 PROCEDURE — 84702 CHORIONIC GONADOTROPIN TEST: CPT | Performed by: STUDENT IN AN ORGANIZED HEALTH CARE EDUCATION/TRAINING PROGRAM

## 2023-01-02 RX ORDER — INSULIN GLARGINE 100 [IU]/ML
32 INJECTION, SOLUTION SUBCUTANEOUS EVERY MORNING
Qty: 15 ML | Refills: 1 | Status: SHIPPED | OUTPATIENT
Start: 2023-01-02 | End: 2023-03-30

## 2023-01-02 RX ORDER — LOPERAMIDE HYDROCHLORIDE 2 MG/1
2 TABLET ORAL 2 TIMES DAILY PRN
Qty: 30 TABLET | Refills: 0 | Status: SHIPPED | OUTPATIENT
Start: 2023-01-02 | End: 2023-06-01

## 2023-01-02 ASSESSMENT — PAIN SCALES - GENERAL: PAINLEVEL: SEVERE PAIN (6)

## 2023-01-02 ASSESSMENT — ASTHMA QUESTIONNAIRES
QUESTION_4 LAST FOUR WEEKS HOW OFTEN HAVE YOU USED YOUR RESCUE INHALER OR NEBULIZER MEDICATION (SUCH AS ALBUTEROL): NOT AT ALL
ACT_TOTALSCORE: 25
QUESTION_3 LAST FOUR WEEKS HOW OFTEN DID YOUR ASTHMA SYMPTOMS (WHEEZING, COUGHING, SHORTNESS OF BREATH, CHEST TIGHTNESS OR PAIN) WAKE YOU UP AT NIGHT OR EARLIER THAN USUAL IN THE MORNING: NOT AT ALL
QUESTION_1 LAST FOUR WEEKS HOW MUCH OF THE TIME DID YOUR ASTHMA KEEP YOU FROM GETTING AS MUCH DONE AT WORK, SCHOOL OR AT HOME: NONE OF THE TIME
QUESTION_2 LAST FOUR WEEKS HOW OFTEN HAVE YOU HAD SHORTNESS OF BREATH: NOT AT ALL
ACT_TOTALSCORE: 25
QUESTION_5 LAST FOUR WEEKS HOW WOULD YOU RATE YOUR ASTHMA CONTROL: COMPLETELY CONTROLLED

## 2023-01-02 ASSESSMENT — ANXIETY QUESTIONNAIRES
6. BECOMING EASILY ANNOYED OR IRRITABLE: NOT AT ALL
GAD7 TOTAL SCORE: 4
1. FEELING NERVOUS, ANXIOUS, OR ON EDGE: SEVERAL DAYS
GAD7 TOTAL SCORE: 4
2. NOT BEING ABLE TO STOP OR CONTROL WORRYING: SEVERAL DAYS
7. FEELING AFRAID AS IF SOMETHING AWFUL MIGHT HAPPEN: SEVERAL DAYS
IF YOU CHECKED OFF ANY PROBLEMS ON THIS QUESTIONNAIRE, HOW DIFFICULT HAVE THESE PROBLEMS MADE IT FOR YOU TO DO YOUR WORK, TAKE CARE OF THINGS AT HOME, OR GET ALONG WITH OTHER PEOPLE: SOMEWHAT DIFFICULT
3. WORRYING TOO MUCH ABOUT DIFFERENT THINGS: SEVERAL DAYS
5. BEING SO RESTLESS THAT IT IS HARD TO SIT STILL: NOT AT ALL

## 2023-01-02 ASSESSMENT — PATIENT HEALTH QUESTIONNAIRE - PHQ9
5. POOR APPETITE OR OVEREATING: NOT AT ALL
SUM OF ALL RESPONSES TO PHQ QUESTIONS 1-9: 11

## 2023-01-02 NOTE — PATIENT INSTRUCTIONS
Hello! It was a pleasure seeing you today. Just some things we discussed at today's visit:     Chronic Diarrhea    Avoid lactose rich foods/dairy products to see if that helps your symptoms   I ordered quite a few stool studies, please complete them when able   A FIT test was ordered to check for blood in the stool, if that comes back positive, we may need to consider a colonoscopy or endoscopy     Sincerely,     Chyna Dawson MD

## 2023-01-02 NOTE — PROGRESS NOTES
1. Chronic diarrhea, etiology unclear suspect dietary related complication vs GI bleed given history of black stools   > Negative family history for IBD, mother does have IBS  -Will trial loperamide (IMODIUM A-D) 2 MG tablet; Take 1 tablet (2 mg) by mouth 2 times daily as needed for diarrhea  Dispense: 30 tablet; Refill: 0  -Since patient reports her stools are watery we will order C. difficile Toxin B PCR with reflex to C. difficile Antigen and Toxins A/B EIA; Future  - Calprotectin Feces and Fecal Lactoferrin if these are negative there is a lower suspicion for IBD  - BREATH HYDROGEN TEST to rule out lactose intolerance  - Tissue transglutaminase heber IgA and IgG and IgA to evaluate for celiac disease  - Basic metabolic panel  (Ca, Cl, CO2, Creat, Gluc, K, Na, BUN) to look for electrolyte abnormalities    2. 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      HPI     Diarrhea  Onset/Duration: months, when eating certain food  Description:       Consistency of stool: watery       Blood in stool: No       Number of loose stools past  24 hours: 6  Progression of Symptoms: same  Accompanying signs and symptoms:       Fever: No       Nausea/Vomiting: No       Abdominal pain: YES       Weight loss: No       Episodes of constipation: No  History   Ill contacts: No  Recent use of antibiotics: No  Recent travels: No  Recent medication-new or changes(Rx or OTC): YES, topiramate for headaches, amlodipine discontinued, coreg dose increased  Precipitating or alleviating factors: seems to be related to food, not all the time when eating, ? High sugar content or carbs  Therapies tried and outcome: none    Chocolate milk   Slim Fast   High sugar and carbs   Processed foods       Review of Systems   As above       Objective    There were no vitals taken for this visit.  There is no height or weight on file to calculate BMI.  Physical Exam  Constitutional:       General: She is not in acute distress.  HENT:      Head: Normocephalic and atraumatic.      Right Ear: External ear normal.      Left Ear: External ear normal.   Eyes:      Extraocular Movements: Extraocular movements intact.   Cardiovascular:      Rate and Rhythm: Normal rate.   Pulmonary:      Effort: Pulmonary effort is normal.   Abdominal:      General: Abdomen is flat. Bowel sounds are normal. There is no distension.      Palpations: Abdomen is soft. There is no mass.      Tenderness: There is abdominal tenderness. There is no guarding or rebound.      Hernia: No hernia is present.      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 Normal range of motion.      Cervical back: Normal range of motion and neck supple.   Skin:     General: Skin is warm.   Neurological:      General: No focal deficit present.      Mental Status: She is alert and oriented to person, place, and time.   Psychiatric:         Mood and Affect: Mood normal.

## 2023-01-02 NOTE — NURSING NOTE
"Oncology Rooming Note    August 13, 2018 3:13 PM   Divina Delgadillo is a 33 year old female who presents for:    Chief Complaint   Patient presents with     Oncology Clinic Visit     1 week recheck Leukocytosis, unspecified type      Initial Vitals: /57 (BP Location: Right arm, Patient Position: Sitting, Cuff Size: Adult Regular)  Pulse 103  Temp 99.8  F (37.7  C) (Tympanic)  Resp 16  Ht 1.626 m (5' 4.02\")  Wt 87.1 kg (192 lb)  SpO2 96%  Breastfeeding? No  BMI 32.94 kg/m2 Estimated body mass index is 32.94 kg/(m^2) as calculated from the following:    Height as of this encounter: 1.626 m (5' 4.02\").    Weight as of this encounter: 87.1 kg (192 lb). Body surface area is 1.98 meters squared.  Extreme Pain (8) Comment: Data Unavailable   No LMP recorded. Patient is not currently having periods (Reason: IUD).  Allergies reviewed: Yes  Medications reviewed: Yes    Medications: Medication refills not needed today.  Pharmacy name entered into EPIC:    [facility] - NO PHONE  PHARMACARE - [facility] - - [place] - [id number] [facility]    Clinical concerns:1 week recheck Leukocytosis, unspecified type     10 minutes for nursing intake (face to face time)     Ilana Ashton CMA              "
- - -

## 2023-01-03 ENCOUNTER — MYC MEDICAL ADVICE (OUTPATIENT)
Dept: FAMILY MEDICINE | Facility: CLINIC | Age: 38
End: 2023-01-03

## 2023-01-03 ENCOUNTER — HOSPITAL ENCOUNTER (OUTPATIENT)
Facility: CLINIC | Age: 38
Discharge: HOME | End: 2023-01-03
Admitting: NURSE PRACTITIONER
Payer: COMMERCIAL

## 2023-01-03 ENCOUNTER — LAB (OUTPATIENT)
Dept: LAB | Facility: CLINIC | Age: 38
End: 2023-01-03
Payer: COMMERCIAL

## 2023-01-03 DIAGNOSIS — K92.1 BLACK STOOL: ICD-10-CM

## 2023-01-03 DIAGNOSIS — N92.6 MISSED PERIOD: ICD-10-CM

## 2023-01-03 DIAGNOSIS — N94.89 OTHER SPECIFIED CONDITIONS ASSOCIATED WITH FEMALE GENITAL ORGANS AND MENSTRUAL CYCLE: ICD-10-CM

## 2023-01-03 DIAGNOSIS — N64.4 BREAST TENDERNESS: Primary | ICD-10-CM

## 2023-01-03 LAB — IGA SERPL-MCNC: 264 MG/DL (ref 84–499)

## 2023-01-03 PROCEDURE — 82274 ASSAY TEST FOR BLOOD FECAL: CPT

## 2023-01-04 LAB
TTG IGA SER-ACNC: 1.3 U/ML
TTG IGG SER-ACNC: 0.8 U/ML

## 2023-01-04 NOTE — TELEPHONE ENCOUNTER
Jaleesa  Pt thinks she might be pregnant. Would you prefer an appt or just a pregnancy test?      Jose De Jesus Guido RN

## 2023-01-05 LAB — HEMOCCULT STL QL IA: NEGATIVE

## 2023-01-05 NOTE — RESULT ENCOUNTER NOTE
Fito Delgadillo,     It was a pleasure speaking with you. I have received and reviewed your lab results, and have the following recommendations:     You tested negative for celiac disease. Your BMP values suggest your kidney function is not significantly changed from 2 weeks ago.      What was a little interested in your lab work was your CBC values. You had an elevated WBC count. WBC are usually elevated in a patient who has an infection. We are working you up for potential infectious causes of diarrhea, but have you been experiencing any other symptoms outside your baseline other than the diarrhea? If not, this is even more reason for you to provide a stool sample for lab analysis/infectious workup.    Because of the elevated WBC, I will be adding some blood work to the labs you provided 3 days ago including a differential to assess specifically what is causing the elevated WBC and a lipase given your pancreatic history.       Sincerely,     Chyna Dawson MD

## 2023-01-06 LAB
HCG INTACT+B SERPL-ACNC: <1 MIU/ML
LIPASE SERPL-CCNC: 63 U/L (ref 13–60)

## 2023-01-07 ENCOUNTER — MYC MEDICAL ADVICE (OUTPATIENT)
Dept: FAMILY MEDICINE | Facility: CLINIC | Age: 38
End: 2023-01-07

## 2023-01-09 NOTE — TELEPHONE ENCOUNTER
Spoke with patient and her worker, she has not yet sent in the stool sample or recheck of her blood work has appointment 1/10/23 to do this.     Scheduled follow up visit with Jaleesa Velasquez NP on 1/11/23 informed her she will need to set up her yearly physical exam at a later date she is being worked in on 1/11/23 to address her immediate concerns, they verbalized good understanding.     Julie Behrendt RN

## 2023-01-10 ENCOUNTER — LAB (OUTPATIENT)
Dept: LAB | Facility: CLINIC | Age: 38
End: 2023-01-10
Payer: COMMERCIAL

## 2023-01-10 DIAGNOSIS — K52.9 CHRONIC DIARRHEA: ICD-10-CM

## 2023-01-10 DIAGNOSIS — R10.812 LEFT UPPER QUADRANT ABDOMINAL TENDERNESS WITHOUT REBOUND TENDERNESS: ICD-10-CM

## 2023-01-10 DIAGNOSIS — D72.829 LEUKOCYTOSIS, UNSPECIFIED TYPE: ICD-10-CM

## 2023-01-10 LAB
BASOPHILS # BLD AUTO: 0.2 10E3/UL (ref 0–0.2)
BASOPHILS NFR BLD AUTO: 1 %
EOSINOPHIL # BLD AUTO: 0.3 10E3/UL (ref 0–0.7)
EOSINOPHIL NFR BLD AUTO: 2 %
ERYTHROCYTE [DISTWIDTH] IN BLOOD BY AUTOMATED COUNT: 13.5 % (ref 10–15)
HCT VFR BLD AUTO: 38 % (ref 35–47)
HGB BLD-MCNC: 11.6 G/DL (ref 11.7–15.7)
IMM GRANULOCYTES # BLD: 0.2 10E3/UL
IMM GRANULOCYTES NFR BLD: 1 %
LYMPHOCYTES # BLD AUTO: 4.4 10E3/UL (ref 0.8–5.3)
LYMPHOCYTES NFR BLD AUTO: 27 %
MCH RBC QN AUTO: 29.4 PG (ref 26.5–33)
MCHC RBC AUTO-ENTMCNC: 30.5 G/DL (ref 31.5–36.5)
MCV RBC AUTO: 96 FL (ref 78–100)
MONOCYTES # BLD AUTO: 1.3 10E3/UL (ref 0–1.3)
MONOCYTES NFR BLD AUTO: 8 %
NEUTROPHILS # BLD AUTO: 10.1 10E3/UL (ref 1.6–8.3)
NEUTROPHILS NFR BLD AUTO: 61 %
NRBC # BLD AUTO: 0 10E3/UL
NRBC BLD AUTO-RTO: 0 /100
PLATELET # BLD AUTO: 426 10E3/UL (ref 150–450)
RBC # BLD AUTO: 3.95 10E6/UL (ref 3.8–5.2)
WBC # BLD AUTO: 16.4 10E3/UL (ref 4–11)

## 2023-01-10 PROCEDURE — 87506 IADNA-DNA/RNA PROBE TQ 6-11: CPT

## 2023-01-10 PROCEDURE — 83993 ASSAY FOR CALPROTECTIN FECAL: CPT

## 2023-01-10 PROCEDURE — 87493 C DIFF AMPLIFIED PROBE: CPT | Mod: 59

## 2023-01-10 PROCEDURE — 85025 COMPLETE CBC W/AUTO DIFF WBC: CPT

## 2023-01-10 PROCEDURE — 36415 COLL VENOUS BLD VENIPUNCTURE: CPT

## 2023-01-10 NOTE — LETTER
January 17, 2023      Divina HOANG Leona  [address] [address] [facility] [address]        Dear ,    We are writing to inform you of your test results.    It was a pleasure speaking with you. I have received and reviewed your lab results, and have the following recommendations:      Your calprotectin results are mildly elevated.  Calprotectin often indicates some sort of inflammatory process occurring within the GI tract.  Multiple things can cause inflammation within the GI tract including a bacterial, viral, ulcerative colitis, or Crohn's associated etiology.     Based on the lab report, you have a borderline result and we would need to recollect a new sample in 4 to 6 weeks.  If the calprotectin continues to remain high despite that timeframe, further work-up with a potential colonoscopy may be necessary.      I will place an order for repeat fecal calprotectin, please make sure you complete that within the next 4 to 6 weeks by calling the lab to schedule an appointment.    Resulted Orders   C. difficile Toxin B PCR with reflex to C. difficile Antigen and Toxins A/B EIA   Result Value Ref Range    C Difficile Toxin B by PCR Negative Negative      Comment:      A negative result does not exclude actual disease due to C. difficile and may be due to improper collection, handling and storage of the specimen or the number of organisms in the specimen is below the detection limit of the assay.    Narrative    The [email or web address] Xpert C. difficile Assay, performed on the [employer]  Instrument Systems, is a qualitative in vitro diagnostic test for rapid detection of toxin B gene sequences from unformed (liquid or soft) stool specimens collected from patients suspected of having Clostridioides difficile infection (CDI). The test utilizes automated real-time polymerase chain reaction (PCR) to detect toxin gene sequences associated with toxin producing C. difficile. The Xpert C. difficile Assay is intended as an  aid in the diagnosis of CDI.   Calprotectin Feces   Result Value Ref Range    Calprotectin Feces 64.6 (H) 0.0 - 49.9 mg/kg      Comment:      Borderline result, please re-evaluate and recollect a new sample in 4-6 weeks.   Enteric Bacteria and Virus Panel by JOHN Stool   Result Value Ref Range    Campylobacter group Not Detected Not Detected    Salmonella species Not Detected Not Detected    Shigella species Not Detected Not Detected    Vibrio group Not Detected Not Detected    Rotavirus Not Detected Not Detected    Shiga toxin 1 gene Not Detected Not Detected    Shiga toxin 2 gene Not Detected Not Detected    Norovirus I and II Not Detected Not Detected    Yersinia enterocolitica Not Detected Not Detected    Narrative    Testing performed by multiplexed, qualitative PCR using the [employer] Enteric Pathogens Nucleic Acid Test. Results should not be used as the sole basis for diagnosis, treatment or other patient management decisions. Positive results do not rule out co-infection with other organisms that are not detected by this test and may not be the sole or definitive cause of patient illness. Negative results in the setting of clinical illness compatible with gastroenteritis may be due to infection by pathogens that are not detected by this test or non-infectious causes such as ulcerative colitis, irritable bowel syndrome or Crohn's disease. Note: Shiga toxin producing E. coli (STEC) typically harbor one or both genes that encode for Shiga toxins 1 and 2.   CBC with platelets and differential   Result Value Ref Range    WBC Count 16.4 (H) 4.0 - 11.0 10e3/uL    RBC Count 3.95 3.80 - 5.20 10e6/uL    Hemoglobin 11.6 (L) 11.7 - 15.7 g/dL    Hematocrit 38.0 35.0 - 47.0 %    MCV 96 78 - 100 fL    MCH 29.4 26.5 - 33.0 pg    MCHC 30.5 (L) 31.5 - 36.5 g/dL    RDW 13.5 10.0 - 15.0 %    Platelet Count 426 150 - 450 10e3/uL    % Neutrophils 61 %    % Lymphocytes 27 %    % Monocytes 8 %    % Eosinophils 2 %    %  Basophils 1 %    % Immature Granulocytes 1 %    NRBCs per 100 WBC 0 <1 /100    Absolute Neutrophils 10.1 (H) 1.6 - 8.3 10e3/uL    Absolute Lymphocytes 4.4 0.8 - 5.3 10e3/uL    Absolute Monocytes 1.3 0.0 - 1.3 10e3/uL    Absolute Eosinophils 0.3 0.0 - 0.7 10e3/uL    Absolute Basophils 0.2 0.0 - 0.2 10e3/uL    Absolute Immature Granulocytes 0.2 <=0.4 10e3/uL    Absolute NRBCs 0.0 10e3/uL       If you have any questions or concerns, please call the clinic at the number listed above.       Sincerely,      Chyna Dawson MD

## 2023-01-11 ENCOUNTER — OFFICE VISIT (OUTPATIENT)
Dept: FAMILY MEDICINE | Facility: CLINIC | Age: 38
End: 2023-01-11
Payer: COMMERCIAL

## 2023-01-11 VITALS
RESPIRATION RATE: 16 BRPM | DIASTOLIC BLOOD PRESSURE: 50 MMHG | OXYGEN SATURATION: 97 % | HEIGHT: 66 IN | BODY MASS INDEX: 36.96 KG/M2 | WEIGHT: 230 LBS | SYSTOLIC BLOOD PRESSURE: 118 MMHG | HEART RATE: 71 BPM | TEMPERATURE: 97.4 F

## 2023-01-11 DIAGNOSIS — R19.4 CHANGE IN BOWEL HABITS: ICD-10-CM

## 2023-01-11 DIAGNOSIS — D72.829 LEUKOCYTOSIS, UNSPECIFIED TYPE: Primary | ICD-10-CM

## 2023-01-11 DIAGNOSIS — Z90.81 ACQUIRED ASPLENIA: ICD-10-CM

## 2023-01-11 DIAGNOSIS — G43.909 MIGRAINE WITHOUT STATUS MIGRAINOSUS, NOT INTRACTABLE, UNSPECIFIED MIGRAINE TYPE: ICD-10-CM

## 2023-01-11 DIAGNOSIS — R10.32 ABDOMINAL PAIN, LEFT LOWER QUADRANT: ICD-10-CM

## 2023-01-11 DIAGNOSIS — N30.00 ACUTE CYSTITIS WITHOUT HEMATURIA: ICD-10-CM

## 2023-01-11 LAB
ALBUMIN SERPL BCG-MCNC: 3.9 G/DL (ref 3.5–5.2)
ALBUMIN UR-MCNC: NEGATIVE MG/DL
ALP SERPL-CCNC: 172 U/L (ref 35–104)
ALT SERPL W P-5'-P-CCNC: 24 U/L (ref 10–35)
ANION GAP SERPL CALCULATED.3IONS-SCNC: 9 MMOL/L (ref 7–15)
APPEARANCE UR: ABNORMAL
AST SERPL W P-5'-P-CCNC: 20 U/L (ref 10–35)
BACTERIA #/AREA URNS HPF: ABNORMAL /HPF
BASOPHILS # BLD AUTO: 0.1 10E3/UL (ref 0–0.2)
BASOPHILS NFR BLD AUTO: 1 %
BILIRUB SERPL-MCNC: 0.2 MG/DL
BILIRUB UR QL STRIP: NEGATIVE
BUN SERPL-MCNC: 33.7 MG/DL (ref 6–20)
C COLI+JEJUNI+LARI FUSA STL QL NAA+PROBE: NOT DETECTED
C DIFF TOX B STL QL: NEGATIVE
CALCIUM SERPL-MCNC: 9.8 MG/DL (ref 8.6–10)
CHLORIDE SERPL-SCNC: 112 MMOL/L (ref 98–107)
COLOR UR AUTO: YELLOW
CREAT SERPL-MCNC: 1.51 MG/DL (ref 0.51–0.95)
DEPRECATED HCO3 PLAS-SCNC: 22 MMOL/L (ref 22–29)
EC STX1 GENE STL QL NAA+PROBE: NOT DETECTED
EC STX2 GENE STL QL NAA+PROBE: NOT DETECTED
EOSINOPHIL # BLD AUTO: 0.3 10E3/UL (ref 0–0.7)
EOSINOPHIL NFR BLD AUTO: 2 %
ERYTHROCYTE [DISTWIDTH] IN BLOOD BY AUTOMATED COUNT: 13.5 % (ref 10–15)
GFR SERPL CREATININE-BSD FRML MDRD: 45 ML/MIN/1.73M2
GLUCOSE SERPL-MCNC: 95 MG/DL (ref 70–99)
GLUCOSE UR STRIP-MCNC: NEGATIVE MG/DL
HCG UR QL: NEGATIVE
HCT VFR BLD AUTO: 38.5 % (ref 35–47)
HGB BLD-MCNC: 11.9 G/DL (ref 11.7–15.7)
HGB UR QL STRIP: NEGATIVE
HYALINE CASTS #/AREA URNS LPF: ABNORMAL /LPF
IMM GRANULOCYTES # BLD: 0.1 10E3/UL
IMM GRANULOCYTES NFR BLD: 1 %
KETONES UR STRIP-MCNC: NEGATIVE MG/DL
LEUKOCYTE ESTERASE UR QL STRIP: ABNORMAL
LIPASE SERPL-CCNC: 71 U/L (ref 13–60)
LYMPHOCYTES # BLD AUTO: 3.9 10E3/UL (ref 0.8–5.3)
LYMPHOCYTES NFR BLD AUTO: 21 %
MCH RBC QN AUTO: 30 PG (ref 26.5–33)
MCHC RBC AUTO-ENTMCNC: 30.9 G/DL (ref 31.5–36.5)
MCV RBC AUTO: 97 FL (ref 78–100)
MONOCYTES # BLD AUTO: 1.2 10E3/UL (ref 0–1.3)
MONOCYTES NFR BLD AUTO: 7 %
MUCOUS THREADS #/AREA URNS LPF: PRESENT /LPF
NEUTROPHILS # BLD AUTO: 12.8 10E3/UL (ref 1.6–8.3)
NEUTROPHILS NFR BLD AUTO: 68 %
NITRATE UR QL: NEGATIVE
NOROV GI+II ORF1-ORF2 JNC STL QL NAA+PR: NOT DETECTED
NRBC # BLD AUTO: 0 10E3/UL
NRBC BLD AUTO-RTO: 0 /100
PH UR STRIP: 5.5 [PH] (ref 5–7)
PLATELET # BLD AUTO: 434 10E3/UL (ref 150–450)
POTASSIUM SERPL-SCNC: 5.3 MMOL/L (ref 3.4–5.3)
PROT SERPL-MCNC: 8 G/DL (ref 6.4–8.3)
RBC # BLD AUTO: 3.97 10E6/UL (ref 3.8–5.2)
RBC #/AREA URNS AUTO: ABNORMAL /HPF
RVA NSP5 STL QL NAA+PROBE: NOT DETECTED
SALMONELLA SP RPOD STL QL NAA+PROBE: NOT DETECTED
SHIGELLA SP+EIEC IPAH STL QL NAA+PROBE: NOT DETECTED
SODIUM SERPL-SCNC: 143 MMOL/L (ref 136–145)
SP GR UR STRIP: 1.02 (ref 1–1.03)
SQUAMOUS #/AREA URNS AUTO: ABNORMAL /LPF
UROBILINOGEN UR STRIP-ACNC: 0.2 E.U./DL
V CHOL+PARA RFBL+TRKH+TNAA STL QL NAA+PR: NOT DETECTED
WBC # BLD AUTO: 18.5 10E3/UL (ref 4–11)
WBC #/AREA URNS AUTO: ABNORMAL /HPF
Y ENTERO RECN STL QL NAA+PROBE: NOT DETECTED

## 2023-01-11 PROCEDURE — 83690 ASSAY OF LIPASE: CPT | Performed by: NURSE PRACTITIONER

## 2023-01-11 PROCEDURE — 87086 URINE CULTURE/COLONY COUNT: CPT | Performed by: NURSE PRACTITIONER

## 2023-01-11 PROCEDURE — 85025 COMPLETE CBC W/AUTO DIFF WBC: CPT | Performed by: NURSE PRACTITIONER

## 2023-01-11 PROCEDURE — 81025 URINE PREGNANCY TEST: CPT | Performed by: NURSE PRACTITIONER

## 2023-01-11 PROCEDURE — 81001 URINALYSIS AUTO W/SCOPE: CPT | Performed by: NURSE PRACTITIONER

## 2023-01-11 PROCEDURE — 99214 OFFICE O/P EST MOD 30 MIN: CPT | Performed by: NURSE PRACTITIONER

## 2023-01-11 PROCEDURE — 80053 COMPREHEN METABOLIC PANEL: CPT | Performed by: NURSE PRACTITIONER

## 2023-01-11 PROCEDURE — 36415 COLL VENOUS BLD VENIPUNCTURE: CPT | Performed by: NURSE PRACTITIONER

## 2023-01-11 RX ORDER — CEPHALEXIN 500 MG/1
500 CAPSULE ORAL 3 TIMES DAILY
Qty: 9 CAPSULE | Refills: 0 | Status: SHIPPED | OUTPATIENT
Start: 2023-01-11 | End: 2023-01-14

## 2023-01-11 RX ORDER — MULTIVITAMIN WITH IRON
100 TABLET ORAL DAILY
Qty: 90 TABLET | Refills: 0 | Status: SHIPPED | OUTPATIENT
Start: 2023-01-11 | End: 2023-03-09

## 2023-01-11 ASSESSMENT — PAIN SCALES - GENERAL: PAINLEVEL: NO PAIN (0)

## 2023-01-11 NOTE — PATIENT INSTRUCTIONS
Recommend to repeat CBC, Lipase, liver function    Schedule CT scan    Recommend to try Metamucil daily, one tea spoon, mix with glass of water and drink daily

## 2023-01-11 NOTE — PROGRESS NOTES
Assessment & Plan     Leukocytosis, unspecified type  -chronic due to asplenia, but recent WBC worsened compare to patient's baseline  -possibly due to UTI, started Keflex, urine culture pending  -due to abdominal pain, recommended CT scan to rule out intraabdominal pathology   - CBC with platelets and differential; Future  - CBC with platelets and differential  - UA with Microscopic reflex to Culture - lab collect; Future  - UA with Microscopic reflex to Culture - lab collect  - Urine Microscopic    Abdominal pain, left lower quadrant    - CBC with platelets and differential; Future  - CT Abdomen Pelvis w Contrast; Future  - Lipase; Future  - Comprehensive metabolic panel (BMP + Alb, Alk Phos, ALT, AST, Total. Bili, TP); Future  - psyllium (METAMUCIL/KONSYL) 58.6 % powder; Take 6 g (1 teaspoonful) by mouth daily  - CBC with platelets and differential  - Lipase  - Comprehensive metabolic panel (BMP + Alb, Alk Phos, ALT, AST, Total. Bili, TP)  - UA with Microscopic reflex to Culture - lab collect; Future  - UA with Microscopic reflex to Culture - lab collect  - Urine Microscopic    Change in bowel habits  -complains of on and off diarrhea and constipation  -stool tests pending, calprotectin ordered to rule out possible inflammatory bowel disease. Also pending stool panels to rule out infectious etiology  -advised patient to start Metamucil, or any other fiber supplement daily  -will consider colonoscopy depending on CT scan results and if patient still continue to have symptoms   -CT scan pending     Acquired asplenia  -chronically elevated WBC worsened over the last 2 weeks and higher than patient's baseline     Migraine without status migrainosus, not intractable, unspecified migraine type  -can be due to hypoglycemia, patient reported that when she skip breakfast her BG drop low, in 60' and she often gets headaches   -recommended to eat healthy, avoid skipping breakfast  -monitor for other triggers   - vitamin  B6 (PYRIDOXINE) 100 MG tablet; Take 1 tablet (100 mg) by mouth daily  -continue Topamax    Acute cystitis without hematuria    - Urine Culture Aerobic Bacterial - lab collect; Future  - cephALEXin (KEFLEX) 500 MG capsule; Take 1 capsule (500 mg) by mouth 3 times daily for 3 days  - Urine Culture Aerobic Bacterial - lab collect        Return in about 2 weeks (around 1/25/2023), or if symptoms worsen or fail to improve.    VERONIQUE [facility]    Rhiannon Murcia is a 37 year old accompanied by her by PCA, presenting for the following health issues: complains of ongoing on and off diarrhea and constipation ongoing for almost a year. Patient also noted occasional abdominal pain in the lower bilateral abdomen and sometimes within LLQ, the patient states pain can be severe sometimes. She noted to have leukocytosis again which was more significant compare to her baseline. Patient has history of chronic leukocytosis due to acquired asplenia. Patient denies fevers, nausea, vomiting. Complains of occasional blood in stools. Also, noted that migraines have been getting worse, unsure about specific triggers, but feels that when she is skipping a breakfast her blood sugars gets low, or around 60' and she gets more headaches.       History of Present Illness       Reason for visit:  High white bood ce count    She eats 0-1 servings of fruits and vegetables daily.She consumes 1 sweetened beverage(s) daily.She exercises with enough effort to increase her heart rate 9 or less minutes per day.  She exercises with enough effort to increase her heart rate 3 or less days per week.   She is taking medications regularly.       Migraine     Since your last clinic visit, how have your headaches changed?  Worsened    How often are you getting headaches or migraines? 3 to 4 times daily      Are you able to do normal daily activities when you have a migraine? Yes and no- depends on how bad they are  "    Are you taking rescue/relief medications? (Select all that apply) Other: sometimes takes tylenol     How helpful is your rescue/relief medication?  I get no relief    Are you taking any medications to prevent migraines? (Select all that apply)  Topamax    In the past 4 weeks, how often have you gone to urgent care or the emergency room because of your headaches?  0        Review of Systems   Constitutional, HEENT, cardiovascular, pulmonary, gi and gu systems are negative, except as otherwise noted.      Objective    /50 (BP Location: Left arm, Patient Position: Sitting, Cuff Size: Adult Large)   Pulse 71   Temp 97.4  F (36.3  C) (Tympanic)   Resp 16   Ht 1.664 m (5' 5.5\")   Wt 104.3 kg (230 lb)   SpO2 97%   BMI 37.69 kg/m    Body mass index is 37.69 kg/m .  Physical Exam   GENERAL: healthy, alert and no distress  EYES: Eyes grossly normal to inspection, PERRL and conjunctivae and sclerae normal  HENT: ear canals and TM's normal, nose and mouth without ulcers or lesions  NECK: no adenopathy, no asymmetry, masses, or scars and thyroid normal to palpation  RESP: lungs clear to auscultation - no rales, rhonchi or wheezes  CV: regular rate and rhythm, normal S1 S2, no S3 or S4, no murmur, click or rub, no peripheral edema and peripheral pulses strong  ABDOMEN: soft, nontender, without hepatosplenomegaly or masses, no organomegaly or masses, liver span normal to percussion and bowel sounds normal  MS: no gross musculoskeletal defects noted, no edema  SKIN: no suspicious lesions or rashes  NEURO: Normal strength and tone, mentation intact and speech normal  PSYCH: mentation appears normal, affect normal/bright    Results for orders placed or performed in visit on 01/11/23 (from the past 24 hour(s))   HCG Qual, Urine ([id number])   Result Value Ref Range    hCG Urine Qualitative Negative Negative   CBC with platelets and differential    Narrative    The following orders were created for panel order CBC with " platelets and differential.  Procedure                               Abnormality         Status                     ---------                               -----------         ------                     CBC with platelets and d...[[id number]]  Abnormal            Final result                 Please view results for these tests on the individual orders.   CBC with platelets and differential   Result Value Ref Range    WBC Count 18.5 (H) 4.0 - 11.0 10e3/uL    RBC Count 3.97 3.80 - 5.20 10e6/uL    Hemoglobin 11.9 11.7 - 15.7 g/dL    Hematocrit 38.5 35.0 - 47.0 %    MCV 97 78 - 100 fL    MCH 30.0 26.5 - 33.0 pg    MCHC 30.9 (L) 31.5 - 36.5 g/dL    RDW 13.5 10.0 - 15.0 %    Platelet Count 434 150 - 450 10e3/uL    % Neutrophils 68 %    % Lymphocytes 21 %    % Monocytes 7 %    % Eosinophils 2 %    % Basophils 1 %    % Immature Granulocytes 1 %    NRBCs per 100 WBC 0 <1 /100    Absolute Neutrophils 12.8 (H) 1.6 - 8.3 10e3/uL    Absolute Lymphocytes 3.9 0.8 - 5.3 10e3/uL    Absolute Monocytes 1.2 0.0 - 1.3 10e3/uL    Absolute Eosinophils 0.3 0.0 - 0.7 10e3/uL    Absolute Basophils 0.1 0.0 - 0.2 10e3/uL    Absolute Immature Granulocytes 0.1 <=0.4 10e3/uL    Absolute NRBCs 0.0 10e3/uL   UA with Microscopic reflex to Culture - lab collect    Specimen: Urine, Midstream   Result Value Ref Range    Color Urine Yellow Colorless, Straw, Light Yellow, Yellow    Appearance Urine Slightly Cloudy (A) Clear    Glucose Urine Negative Negative mg/dL    Bilirubin Urine Negative Negative    Ketones Urine Negative Negative mg/dL    Specific Gravity Urine 1.025 1.003 - 1.035    Blood Urine Negative Negative    pH Urine 5.5 5.0 - 7.0    Protein Albumin Urine Negative Negative mg/dL    Urobilinogen Urine 0.2 0.2, 1.0 E.U./dL    Nitrite Urine Negative Negative    Leukocyte Esterase Urine Small (A) Negative   Urine Microscopic   Result Value Ref Range    Bacteria Urine Moderate (A) None Seen /HPF    RBC Urine 0-2 0-2 /HPF /HPF    WBC Urine 5-10 (A)  0-5 /HPF /HPF    Squamous Epithelials Urine Moderate (A) None Seen /LPF    Mucus Urine Present (A) None Seen /LPF    Hyaline Casts Urine 0-2 (A) None Seen /LPF    Narrative    Urine Culture not indicated     *Note: Due to a large number of results and/or encounters for the requested time period, some results have not been displayed. A complete set of results can be found in Results Review.

## 2023-01-12 NOTE — RESULT ENCOUNTER NOTE
Ryan Murcia,     I was thinking that perhaps your elevated WBC was secondary to a stool infection. However, you have tested negative for C. Diff and other infectious organisms. I saw that Jaleesa started you on antibiotics for a suspected urinary tract infection and that we are awaiting the results of your culture.     Sincerely,     Chyna Dawson MD

## 2023-01-13 LAB
BACTERIA UR CULT: NORMAL
CALPROTECTIN STL-MCNT: 64.6 MG/KG (ref 0–49.9)

## 2023-01-16 ENCOUNTER — HOSPITAL ENCOUNTER (OUTPATIENT)
Dept: CT IMAGING | Facility: CLINIC | Age: 38
Discharge: HOME | End: 2023-01-16
Attending: NURSE PRACTITIONER | Admitting: NURSE PRACTITIONER
Payer: COMMERCIAL

## 2023-01-16 DIAGNOSIS — R10.32 ABDOMINAL PAIN, LEFT LOWER QUADRANT: ICD-10-CM

## 2023-01-16 DIAGNOSIS — R19.4 CHANGE IN BOWEL HABITS: Primary | ICD-10-CM

## 2023-01-16 PROCEDURE — 250N000009 HC RX 250: Performed by: RADIOLOGY

## 2023-01-16 PROCEDURE — 74177 CT ABD & PELVIS W/CONTRAST: CPT

## 2023-01-16 PROCEDURE — 250N000011 HC RX IP 250 OP 636: Performed by: RADIOLOGY

## 2023-01-16 RX ORDER — IOPAMIDOL 755 MG/ML
100 INJECTION, SOLUTION INTRAVASCULAR ONCE
Status: COMPLETED | OUTPATIENT
Start: 2023-01-16 | End: 2023-01-16

## 2023-01-16 RX ADMIN — IOPAMIDOL 100 ML: 755 INJECTION, SOLUTION INTRAVENOUS at 08:52

## 2023-01-16 RX ADMIN — SODIUM CHLORIDE 68 ML: 9 INJECTION, SOLUTION INTRAVENOUS at 08:52

## 2023-01-16 NOTE — RESULT ENCOUNTER NOTE
Fito Delgadillo,     It was a pleasure speaking with you. I have received and reviewed your lab results, and have the following recommendations:     Your calprotectin results are mildly elevated.  Calprotectin often indicates some sort of inflammatory process occurring within the GI tract.  Multiple things can cause inflammation within the GI tract including a bacterial, viral, ulcerative colitis, or Crohn's associated etiology.    Based on the lab report, you have a borderline result and we would need to recollect a new sample in 4 to 6 weeks.  If the calprotectin continues to remain high despite that timeframe, further work-up with a potential colonoscopy may be necessary.     I will place an order for repeat fecal calprotectin, please make sure you complete that within the next 4 to 6 weeks by calling the lab to schedule an appointment.    Sincerely,     Chyna Dawson MD

## 2023-01-25 ENCOUNTER — LAB (OUTPATIENT)
Dept: LAB | Facility: CLINIC | Age: 38
End: 2023-01-25
Payer: COMMERCIAL

## 2023-01-25 DIAGNOSIS — F25.0 SCHIZOAFFECTIVE DISORDER, BIPOLAR TYPE (H): ICD-10-CM

## 2023-01-25 DIAGNOSIS — F25.0 SCHIZOAFFECTIVE DISORDER, BIPOLAR TYPE (H): Primary | ICD-10-CM

## 2023-01-25 LAB
BASOPHILS # BLD AUTO: 0.1 10E3/UL (ref 0–0.2)
BASOPHILS NFR BLD AUTO: 1 %
EOSINOPHIL # BLD AUTO: 0.4 10E3/UL (ref 0–0.7)
EOSINOPHIL NFR BLD AUTO: 2 %
ERYTHROCYTE [DISTWIDTH] IN BLOOD BY AUTOMATED COUNT: 13.9 % (ref 10–15)
HCT VFR BLD AUTO: 35.8 % (ref 35–47)
HGB BLD-MCNC: 11 G/DL (ref 11.7–15.7)
IMM GRANULOCYTES # BLD: 0.2 10E3/UL
IMM GRANULOCYTES NFR BLD: 1 %
LYMPHOCYTES # BLD AUTO: 4.5 10E3/UL (ref 0.8–5.3)
LYMPHOCYTES NFR BLD AUTO: 27 %
MCH RBC QN AUTO: 29.8 PG (ref 26.5–33)
MCHC RBC AUTO-ENTMCNC: 30.7 G/DL (ref 31.5–36.5)
MCV RBC AUTO: 97 FL (ref 78–100)
MONOCYTES # BLD AUTO: 0.9 10E3/UL (ref 0–1.3)
MONOCYTES NFR BLD AUTO: 5 %
NEUTROPHILS # BLD AUTO: 10.6 10E3/UL (ref 1.6–8.3)
NEUTROPHILS NFR BLD AUTO: 64 %
PLATELET # BLD AUTO: 407 10E3/UL (ref 150–450)
RBC # BLD AUTO: 3.69 10E6/UL (ref 3.8–5.2)
WBC # BLD AUTO: 16.7 10E3/UL (ref 4–11)

## 2023-01-25 PROCEDURE — 36415 COLL VENOUS BLD VENIPUNCTURE: CPT

## 2023-01-25 PROCEDURE — 85025 COMPLETE CBC W/AUTO DIFF WBC: CPT

## 2023-01-26 ENCOUNTER — MYC MEDICAL ADVICE (OUTPATIENT)
Dept: FAMILY MEDICINE | Facility: CLINIC | Age: 38
End: 2023-01-26
Payer: COMMERCIAL

## 2023-02-01 ENCOUNTER — MYC MEDICAL ADVICE (OUTPATIENT)
Dept: FAMILY MEDICINE | Facility: CLINIC | Age: 38
End: 2023-02-01
Payer: COMMERCIAL

## 2023-02-01 NOTE — TELEPHONE ENCOUNTER
"OB/Gyn care team.  Pt has sent a My Chart request to be \"put out\" for her IUD replacement. Can you call her?  "

## 2023-02-01 NOTE — TELEPHONE ENCOUNTER
Spoke with patient on the phone.  Patient needs IUD remove and replace.  Would like this done in the OR with some sedation.  Will use appointment scheduled as consult appointment with Dr. Westbrook.  Patient aware actual removal and replace will be scheduled for later date after appointment in the OR.    Brigitte Harper   Ob/Gyn Clinic  RN

## 2023-02-07 DIAGNOSIS — L65.9 HAIR LOSS: ICD-10-CM

## 2023-02-07 DIAGNOSIS — K21.9 GASTROESOPHAGEAL REFLUX DISEASE WITHOUT ESOPHAGITIS: ICD-10-CM

## 2023-02-10 RX ORDER — BIOTIN 5 MG
1 TABLET ORAL DAILY
Qty: 60 TABLET | Refills: 1 | OUTPATIENT
Start: 2023-02-10

## 2023-02-13 ENCOUNTER — OFFICE VISIT (OUTPATIENT)
Dept: URGENT CARE | Facility: URGENT CARE | Age: 38
End: 2023-02-13
Payer: COMMERCIAL

## 2023-02-13 ENCOUNTER — ANCILLARY PROCEDURE (OUTPATIENT)
Dept: GENERAL RADIOLOGY | Facility: CLINIC | Age: 38
End: 2023-02-13
Payer: COMMERCIAL

## 2023-02-13 VITALS
WEIGHT: 230 LBS | HEART RATE: 83 BPM | TEMPERATURE: 97.6 F | SYSTOLIC BLOOD PRESSURE: 127 MMHG | DIASTOLIC BLOOD PRESSURE: 60 MMHG | BODY MASS INDEX: 37.69 KG/M2 | OXYGEN SATURATION: 98 %

## 2023-02-13 DIAGNOSIS — S99.922A FOOT INJURY, LEFT, INITIAL ENCOUNTER: Primary | ICD-10-CM

## 2023-02-13 DIAGNOSIS — S99.922A FOOT INJURY, LEFT, INITIAL ENCOUNTER: ICD-10-CM

## 2023-02-13 PROCEDURE — 73630 X-RAY EXAM OF FOOT: CPT | Mod: TC | Performed by: RADIOLOGY

## 2023-02-13 PROCEDURE — 99214 OFFICE O/P EST MOD 30 MIN: CPT

## 2023-02-13 RX ORDER — MELOXICAM 15 MG/1
15 TABLET ORAL DAILY
Qty: 10 TABLET | Refills: 0 | Status: SHIPPED | OUTPATIENT
Start: 2023-02-13 | End: 2023-06-01

## 2023-02-13 NOTE — PROGRESS NOTES
URGENT CARE  Assessment & Plan   Assessment:   Divina Delgadillo is a 37 year old female who's clinical presentation today is consistent with:   1. Foot injury, left, initial encounter  - XR Foot Left G/E 3 Views; Future  - Ankle/Foot Bracing Supplies DME Post-op Shoe; Left  - meloxicam (MOBIC) 15 MG tablet; Take 1 tablet (15 mg) by mouth daily for 10 days  Dispense: 10 tablet; Refill: 0    No alarm signs or symptoms present   Differential Diagnoses for this patient's CC include    fracture, dislocation  Ligamentous vs tendon pathology, musculoskeletal injury, soft tissue injury    Plan:  Radiologic films today were negative} for fractures or dislocation today, will treat patient at this time symptomatically and supportively, this will include encouraging: using NSAIDs/Tylenol to help decrease pain and inflammation, resting, applying ice/heat as needed, compression and elevation}  Patient supplied with post-op shoe/ flat shoe today for increased joint pain and laxity-educated patient on removal during bedtime and bathing to assess for skin integrity and promote increased range of motion}   Educated patient to follow-up with their PCP or ortho in the next 1-2 weeks for further evaluation and reassessment, and due to the possibility of an occult fracture} also discussed to return immediatly  if symptoms worsen after today's visit.     Patient is agreeable to treatment plan and state they will follow-up if symptoms do not improve and/or if symptoms worsen (see patient's AVS 'monitor for' section for specific patient instructions given and discussed regarding what to watch for and when to follow up)    Medications ordered are listed above, please see AVS for patient's specific and personalized discharge instructions given     VERONIQUE Riggins [facility] [place]      ______________________________________________________________________        Subjective  Subjective     HPI: Divina K Leona   "is a 37 year old  female who presents today for evaluation the following concerns:   Patient presents endorsing left foot pain which started 1.5 weeks ago on 2/1/23.    Patient states this pain is not  related to a traumatic injury/accident and is acute, patient states that they were walking and maybe rolled their ankle on the ice, but that she can't remember    Patient localizes the pain to the outer aspect , and states there is no  radiation of the pain  patient denies any associated} symptoms such as swelling, redness or bruising   Patient states their: Skin is intact}. ROM is \"normal\", has full range}, and their strength is normal}   Patient reports sensation is without numbness or tingling.   Self care to this point includes: nothing .   Patient has no history of injury, fracture or surgery to this area of concern in the past.      Allergies   Allergen Reactions     Acetaminophen Other (See Comments)     pt previously tried to overdose on it, PMD does not want pt taking per pt.      Latex Rash     Patient Active Problem List   Diagnosis     Esophageal reflux     NAFL (nonalcoholic fatty liver)     Essential hypertension     Hyperlipidemia LDL goal <100     Stage 3 chronic kidney disease     Mucinous neoplasm of pancreas     Type 2 diabetes mellitus with stage 3 chronic kidney disease, with long-term current use of insulin (H)     Bipolar disorder (H)     Cervical high risk HPV (human papillomavirus) test positive     IUD (intrauterine device) in place     Morbid obesity (H)     Mild intermittent asthma with acute exacerbation     Nicotine abuse     Chronic leukocytosis     Acquired asplenia     Borderline personality disorder (H)     PTSD (post-traumatic stress disorder)     Long term (current) use of anticoagulants     Orthostatic hypotension     Tobacco abuse     Vitamin D insufficiency     Depressive disorder     Schizoaffective disorder, depressive type (H)     Uncontrolled type 2 diabetes mellitus with " diabetic polyneuropathy, with long-term current use of insulin     Cardiac murmur     History of DVT of arm  (deep vein thrombosis)     Insomnia, unspecified type     Ulnar neuropathy of both upper extremities       Review of Systems:  Pertinent review of systems as reflected in HPI, otherwise negative.     Objective  Objective    Physical Exam:  Vitals:    02/13/23 1249   BP: 127/60   BP Location: Right arm   Patient Position: Sitting   Cuff Size: Adult Large   Pulse: 83   Temp: 97.6  F (36.4  C)   TempSrc: Tympanic   SpO2: 98%   Weight: 104.3 kg (230 lb)      General:   alert and oriented, no acute distress, nonill-appearing   Vital signs reviewed: afebrile and normotensive     Psy/mental status: cooperative and pleasant   MSK:left :foot: no  erythremia, ecchymosis, bruising, or  inflammation present on the  plantar, dorsal,medial, lateral} aspect of the forefoot/rearfoot}   Increased tenderness/pain with palpation on the lateral aspect of the midfoot  No} decreased range of motion with dorsiflexion, plantar flexion, inversion, and  eversion.   Temperature equal} to body temperature. Neurovascularly intact distally with pulse +2. no crepitus, no gross deformity, and no laceration.        Imaging:   All images were personally read by this provider (myself).   Per my independent interpretation the xray shows no acute fracture or dislocation seen    I explained my diagnostic considerations and recommendations to the patient, who voiced understanding and agreement with the treatment plan.   All questions were answered.   We discussed potential side effects, risks and benefits of any prescribed or recommended therapies, as well as expectations for response to treatments.  Please see AVS for any patient instructions & handouts given.   Patient was advised to contact the Nurse Care Line, their Primary Care provider, Urgent Care, or the Emergency Department if there are new or worsening symptoms, or call 911 for  "emergencies.        ______________________________________________________________________        Patient Instructions     Diagnosis: Orthopedic injury; Muscular strain/sprain injury, soft tissue injury    Today we did:  Xray:  negative for fracture or dislocation   Plan:   1. Avoid or restrict any activities that may aggravate your symptoms. Cease exacerbating activity for 2 to 6 weeks, then gradually return to exercise/sport as tolerated.    Work/school/activity note ?     Recommend wearing brace for the next few weeks to assist with support.}    Recommending light stretching (when able to tolerate) to reduce your risks of developing a frozen joint or stiffness in joint     This will also help to increase strength and flexibility over time related to injury     Recovery expected within 2-6 weeks depending on severity, but some may take up to 6-12 months.    If symptoms fail to resolve or worsen recommending follow-up with orthopedics/physical therapy after allowing adequate time for healing. - will place referral today   P.R.I.C.E.  For musculoskeletal injuries including: sprains, strains, bruises - use the acronym P.R.I.C.E. for symptomatic treatment:   1. Prevent & Protect further injury.  2. Rest affected area   3. Ice applied (or heat, or can alternate)   4. Compression of injured area (ACE bandage/splint).  5. Elevation of injured area.  Pain  Ibuprofen / tylenol for pain   - Ibuprofen 600mg Q6hr as needed  (can use celebrex if GI upset or GI bleed risk)    - tylenol 500mg Q8hr   - Can use aleve / naproxen / naprosyn also      No narcotics - no evidence to support this use and people tend to over use \"injured\" extremity when not having pain    (Pain is body's way of making you take it easy for awhile)   - orthopedic speciality will discuss stronger medications if needed indicated at that time  Monitor for:     Worsening pain     Worsening numbness and tingling     Loss of range of motion or strength "     Redness, warmth or infection at site     Fevers, chills

## 2023-02-13 NOTE — PATIENT INSTRUCTIONS
"  Diagnosis: Orthopedic injury; Muscular strain/sprain injury, soft tissue injury    Today we did:  Xray:  negative for fracture or dislocation   Plan:   Avoid or restrict any activities that may aggravate your symptoms. Cease exacerbating activity for 2 to 6 weeks, then gradually return to exercise/sport as tolerated.  Work/school/activity note ?   Recommend wearing brace for the next few weeks to assist with support.}  Recommending light stretching (when able to tolerate) to reduce your risks of developing a frozen joint or stiffness in joint   This will also help to increase strength and flexibility over time related to injury   Recovery expected within 2-6 weeks depending on severity, but some may take up to 6-12 months.  If symptoms fail to resolve or worsen recommending follow-up with orthopedics/physical therapy after allowing adequate time for healing. - will place referral today   P.R.I.C.E.  For musculoskeletal injuries including: sprains, strains, bruises - use the acronym P.R.I.C.E. for symptomatic treatment:   Prevent & Protect further injury.  Rest affected area   Ice applied (or heat, or can alternate)   Compression of injured area (ACE bandage/splint).  Elevation of injured area.  Pain  Ibuprofen / tylenol for pain   - Ibuprofen 600mg Q6hr as needed  (can use celebrex if GI upset or GI bleed risk)    - tylenol 500mg Q8hr   - Can use aleve / naproxen / naprosyn also      No narcotics - no evidence to support this use and people tend to over use \"injured\" extremity when not having pain    (Pain is body's way of making you take it easy for awhile)   - orthopedic speciality will discuss stronger medications if needed indicated at that time  Monitor for:   Worsening pain   Worsening numbness and tingling   Loss of range of motion or strength   Redness, warmth or infection at site   Fevers, chills      "

## 2023-02-14 ENCOUNTER — OFFICE VISIT (OUTPATIENT)
Dept: OBGYN | Facility: CLINIC | Age: 38
End: 2023-02-14
Payer: COMMERCIAL

## 2023-02-14 VITALS
DIASTOLIC BLOOD PRESSURE: 58 MMHG | RESPIRATION RATE: 18 BRPM | TEMPERATURE: 99 F | SYSTOLIC BLOOD PRESSURE: 133 MMHG | WEIGHT: 229 LBS | HEIGHT: 66 IN | BODY MASS INDEX: 36.8 KG/M2 | HEART RATE: 79 BPM

## 2023-02-14 DIAGNOSIS — G43.009 MIGRAINE WITHOUT AURA AND WITHOUT STATUS MIGRAINOSUS, NOT INTRACTABLE: ICD-10-CM

## 2023-02-14 DIAGNOSIS — Z30.49 ENCOUNTER FOR SURVEILLANCE OF OTHER CONTRACEPTIVE: Primary | ICD-10-CM

## 2023-02-14 DIAGNOSIS — L65.9 HAIR LOSS: ICD-10-CM

## 2023-02-14 PROCEDURE — 99202 OFFICE O/P NEW SF 15 MIN: CPT | Performed by: STUDENT IN AN ORGANIZED HEALTH CARE EDUCATION/TRAINING PROGRAM

## 2023-02-14 NOTE — NURSING NOTE
"Initial /58 (BP Location: Left arm, Patient Position: Chair, Cuff Size: Adult Large)   Pulse 79   Temp 99  F (37.2  C) (Tympanic)   Resp 18   Ht 1.664 m (5' 5.5\")   Wt 103.9 kg (229 lb)   BMI 37.53 kg/m   Estimated body mass index is 37.53 kg/m  as calculated from the following:    Height as of this encounter: 1.664 m (5' 5.5\").    Weight as of this encounter: 103.9 kg (229 lb). .    "

## 2023-02-14 NOTE — PROGRESS NOTES
"[facility]  Gynecology Office Note    Assessment and Plan:   Divina Delgadillo is a 37 year old  who presents for Mirena IUD replacement.  Patient is using a Mirena IUD both for contraception purposes and also for bleeding control.  Since patient is not having any menses with the IUD in place, I recommended exchange of the Mirena IUD in 2025 or when she starts having menses again.  Patient did not tolerate clinical exam well last time during Mirena IUD placement and would like this procedure to be performed under anesthesia.  We discussed premedication with benzodiazepine, ibuprofen, and Tylenol prior to the procedure and patient declined.  Patient will message us once her menses to resume.  At which time, I will place orders for \" exam under anesthesia, Mirena IUD replacement, possible Pap smear?\".  Patient will need a preop H&P by her PCP due to her multiple medical conditions.    Last pap 3/26/2021 NILM w/ neg HPV - plan to cotest in 3 years due to h/o other high risk HPV positive.    Dariana Westbrook MD  Obstetrics and Gynecology  [facility]   2023     -----------------------------------------------------------------------------------------------------------------------------------    S: Divina Delgadillo is a 37 year old  who presents for Mirena IUD replacement. Mirena IUD inserted in office on 2017. Currently not having any vaginal bleeding with Mirena IUD in place. Denied other questions or concerns: no vaginal symptoms.  No concerns for sexually transmitted infection.    O:   Vitals:    23 1027   BP: 133/58   BP Location: Left arm   Patient Position: Chair   Cuff Size: Adult Large   Pulse: 79   Resp: 18   Temp: 99  F (37.2  C)   TempSrc: Tympanic   Weight: 103.9 kg (229 lb)   Height: 1.664 m (5' 5.5\")      Estimated body mass index is 37.53 kg/m  as calculated from the following:    Height as of this encounter: 1.664 m (5' 5.5\").    " Weight as of this encounter: 103.9 kg (229 lb).    General appearance: well-hydrated, A&O x 3, no apparent distress  Lungs: Equal expansion bilaterally, no accessory muscle use  Heart: No heaves or thrills.   Neuro: CN II-XII grossly intact

## 2023-02-15 RX ORDER — BIOTIN 5 MG
1 TABLET ORAL DAILY
Qty: 60 TABLET | Refills: 1 | Status: SHIPPED | OUTPATIENT
Start: 2023-02-15 | End: 2023-07-13

## 2023-02-15 RX ORDER — TOPIRAMATE 25 MG/1
25 TABLET, FILM COATED ORAL 2 TIMES DAILY
Qty: 60 TABLET | Refills: 1 | Status: SHIPPED | OUTPATIENT
Start: 2023-02-15 | End: 2023-04-03

## 2023-02-15 NOTE — TELEPHONE ENCOUNTER
Pending Prescriptions:                       Disp   Refills    topiramate (TOPAMAX) 25 MG tablet [Pharmac*60 tab*1        Sig: Take 1 tablet (25 mg) by mouth 2 times daily    Biotin 5 MG TABS [Pharmacy Med Name: bioti*60 tab*1        Sig: Take 1 tablet by mouth daily        Routing refill request to provider for review/approval because labs out of range for topiramate:  Normal CBC on file in past 26 months      Recent Labs   Lab Test 01/25/23  0804   WBC 16.7*   RBC 3.69*   HGB 11.0*   HCT 35.8          Last Written Prescription Date:  12/21/22  Last Fill Quantity: 60,  # refills: 1   Last office visit: 1/11/2023 with prescribing provider.   Then biotin not on current med list, appears was discontinued 1/2023.       Mer Arthur RN

## 2023-02-18 ENCOUNTER — MYC MEDICAL ADVICE (OUTPATIENT)
Dept: EDUCATION SERVICES | Facility: CLINIC | Age: 38
End: 2023-02-18
Payer: COMMERCIAL

## 2023-02-21 ENCOUNTER — ANCILLARY PROCEDURE (OUTPATIENT)
Dept: GENERAL RADIOLOGY | Facility: CLINIC | Age: 38
End: 2023-02-21
Attending: NURSE PRACTITIONER
Payer: COMMERCIAL

## 2023-02-21 ENCOUNTER — OFFICE VISIT (OUTPATIENT)
Dept: FAMILY MEDICINE | Facility: CLINIC | Age: 38
End: 2023-02-21
Payer: COMMERCIAL

## 2023-02-21 VITALS
OXYGEN SATURATION: 88 % | BODY MASS INDEX: 37.61 KG/M2 | HEIGHT: 66 IN | RESPIRATION RATE: 16 BRPM | DIASTOLIC BLOOD PRESSURE: 68 MMHG | WEIGHT: 234 LBS | TEMPERATURE: 98 F | SYSTOLIC BLOOD PRESSURE: 154 MMHG | HEART RATE: 93 BPM

## 2023-02-21 DIAGNOSIS — J40 BRONCHITIS: ICD-10-CM

## 2023-02-21 DIAGNOSIS — J45.21 MILD INTERMITTENT ASTHMA WITH ACUTE EXACERBATION: Chronic | ICD-10-CM

## 2023-02-21 DIAGNOSIS — M25.572 PAIN IN JOINT INVOLVING ANKLE AND FOOT, LEFT: ICD-10-CM

## 2023-02-21 DIAGNOSIS — J20.8 ACUTE BRONCHITIS DUE TO OTHER SPECIFIED ORGANISMS: Primary | ICD-10-CM

## 2023-02-21 LAB
FLUAV RNA SPEC QL NAA+PROBE: NEGATIVE
FLUBV RNA RESP QL NAA+PROBE: NEGATIVE
RSV RNA SPEC NAA+PROBE: NEGATIVE
SARS-COV-2 RNA RESP QL NAA+PROBE: NEGATIVE

## 2023-02-21 PROCEDURE — 99214 OFFICE O/P EST MOD 30 MIN: CPT | Mod: CS | Performed by: NURSE PRACTITIONER

## 2023-02-21 PROCEDURE — 71046 X-RAY EXAM CHEST 2 VIEWS: CPT | Mod: TC | Performed by: RADIOLOGY

## 2023-02-21 PROCEDURE — 87637 SARSCOV2&INF A&B&RSV AMP PRB: CPT | Performed by: NURSE PRACTITIONER

## 2023-02-21 RX ORDER — PREDNISONE 20 MG/1
20 TABLET ORAL DAILY
Qty: 5 TABLET | Refills: 0 | Status: SHIPPED | OUTPATIENT
Start: 2023-02-21 | End: 2023-02-26

## 2023-02-21 ASSESSMENT — PATIENT HEALTH QUESTIONNAIRE - PHQ9
SUM OF ALL RESPONSES TO PHQ QUESTIONS 1-9: 11
SUM OF ALL RESPONSES TO PHQ QUESTIONS 1-9: 11
10. IF YOU CHECKED OFF ANY PROBLEMS, HOW DIFFICULT HAVE THESE PROBLEMS MADE IT FOR YOU TO DO YOUR WORK, TAKE CARE OF THINGS AT HOME, OR GET ALONG WITH OTHER PEOPLE: SOMEWHAT DIFFICULT

## 2023-02-21 ASSESSMENT — PAIN SCALES - GENERAL: PAINLEVEL: NO PAIN (0)

## 2023-02-21 NOTE — LETTER
February 23, 2023      Divina HOANG Santamariaharriet  [id number] [facility] [id number]        Dear ,    We are writing to inform you of your test results.    Your test results fall within the expected range(s) or remain unchanged from previous results.  Please continue with current treatment plan.    Resulted Orders   Symptomatic Influenza A/B & SARS-CoV2 (COVID-19) Virus PCR Multiplex Nose   Result Value Ref Range    Influenza A PCR Negative Negative    Influenza B PCR Negative Negative    RSV PCR Negative Negative    SARS CoV2 PCR Negative Negative      Comment:      NEGATIVE: SARS-CoV-2 (COVID-19) RNA not detected, presumed negative.    Narrative    Testing was performed using the Xpert Xpress CoV2/Flu/RSV Assay on the Cepheid GeneXpert Instrument. This test should be ordered for the detection of SARS-CoV-2 and influenza viruses in individuals who meet clinical and/or epidemiological criteria. Test performance is unknown in asymptomatic patients. This test is for in vitro diagnostic use under the FDA EUA for laboratories certified under CLIA to perform high or moderate complexity testing. This test has not been FDA cleared or approved. A negative result does not rule out the presence of PCR inhibitors in the specimen or target RNA in concentration below the limit of detection for the assay. If only one viral target is positive but coinfection with multiple targets is suspected, the sample should be re-tested with another FDA cleared, approved, or authorized test, if coinfection would change clinical management. This test was validated by the [facility] [employer]. These laboratories are certified under the Clinical Laboratory Improvement Amendments of 1988 (CLIA-88) as qualified to perform high complexity laboratory testing.       If you have any questions or concerns, please call the clinic at the number listed above.       Sincerely,      VERONIQUE Kerr CNP

## 2023-02-21 NOTE — PROGRESS NOTES
"  Assessment & Plan     Acute bronchitis due to other specified organisms    - Symptomatic Influenza A/B & SARS-CoV2 (COVID-19) Virus PCR Multiplex; Future  - Symptomatic Influenza A/B & SARS-CoV2 (COVID-19) Virus PCR Multiplex Nose  Will do necessary swabs, stay home until results available. Prednisone initiated for possible asthma exacerbation versus infectious.  X ray pending.  Reviewed need to monitor closely, seek emergent care if worsening and follow up to assess oxygen level and blood pressure.    Bronchitis    - predniSONE (DELTASONE) 20 MG tablet; Take 1 tablet (20 mg) by mouth daily for 5 days  - XR Chest 2 Views; Future  - Symptomatic Influenza A/B & SARS-CoV2 (COVID-19) Virus PCR Multiplex; Future  - Symptomatic Influenza A/B & SARS-CoV2 (COVID-19) Virus PCR Multiplex Nose  Discussed how to take the medication(s), expected outcomes, potential side effects.    Pain in joint involving ankle and foot, left    Allow this to heal. Offered PT, she declined.      Mild intermittent asthma with acute exacerbation               BMI:   Estimated body mass index is 38.35 kg/m  as calculated from the following:    Height as of this encounter: 1.664 m (5' 5.5\").    Weight as of this encounter: 106.1 kg (234 lb).       Depression Screening Follow Up    PHQ 2/21/2023   PHQ-9 Total Score 11   Q9: Thoughts of better off dead/self-harm past 2 weeks Not at all     Last PHQ-9 2/21/2023   1.  Little interest or pleasure in doing things 1   2.  Feeling down, depressed, or hopeless 1   3.  Trouble falling or staying asleep, or sleeping too much 2   4.  Feeling tired or having little energy 1   5.  Poor appetite or overeating 3   6.  Feeling bad about yourself 2   7.  Trouble concentrating 1   8.  Moving slowly or restless 0   Q9: Thoughts of better off dead/self-harm past 2 weeks 0   PHQ-9 Total Score 11   Difficulty at work, home, or with people -       Follow Up Actions Taken       See Patient Instructions  Patient " Instructions   Schedule follow up appointment for yearly visit and lab work with PCP, do that as soon as possible to recheck oxygen levels.  If worsening, seek emergent care.  Will be notified of pending x ray.  Take the prednisone as prescribed. Use inhaler as needed.  Will be notified of pending swab results.      Our Clinic hours are:  Mondays    7:20 am - 7 pm  Tues -  Fri  7:20 am - 5 pm    Clinic Phone: [phone]    The clinic lab opens at 7:30 am Mon - Fri and appointments are required.    [facility]. [phone]  Monday  8 am - 7pm  Tues - Fri 8 am - 5:30 pm              Return in about 1 week (around 2/28/2023) for or sooner if symptoms persist or worsen.    VERONIQUE Kerr [facility]    Rhiannon Murcia is a 37 year old, presenting for the following health issues:  Cough      History of Present Illness       Reason for visit:  Foot pain and very bad caugh and possible cracked ribbs    She eats 0-1 servings of fruits and vegetables daily.She consumes 2 sweetened beverage(s) daily.She exercises with enough effort to increase her heart rate 9 or less minutes per day.  She exercises with enough effort to increase her heart rate 3 or less days per week.   She is taking medications regularly.    Today's PHQ-9         PHQ-9 Total Score: 11    PHQ-9 Q9 Thoughts of better off dead/self-harm past 2 weeks :   Not at all    How difficult have these problems made it for you to do your work, take care of things at home, or get along with other people: Somewhat difficult       She hurt her left ankle, twisted it on the ice about 2 weeks ago. She had negative x ray and is wearing a brace when walking. Is frustrated it's not much better.  She also developed a cough worsening over past 4 days. No fever and denies feeling poorly other than worsening cough. Has been using her inhaler. Cough is mostly non productive. She did not do COVID or any other  testing.   Current Outpatient Medications   Medication     acetaminophen (TYLENOL) 325 MG tablet     albuterol (VENTOLIN HFA) 108 (90 Base) MCG/ACT inhaler     ARIPiprazole (ABILIFY) 30 MG tablet     atorvastatin (LIPITOR) 10 MG tablet     Biotin 5 MG TABS     carvedilol (COREG) 12.5 MG tablet     cloZAPine (CLOZARIL) 100 MG tablet     Continuous Blood Gluc Sensor (DEXCOM G6 SENSOR) MISC     Continuous Blood Gluc Transmit (DEXCOM G6 TRANSMITTER) MISC     fluocinolone acetonide 0.01 % OIL     FLUoxetine (PROZAC) 40 MG capsule     glucose (BD GLUCOSE) 4 g chewable tablet     Insulin Aspart FlexPen 100 UNIT/ML SOPN     insulin glargine (LANTUS SOLOSTAR) 100 UNIT/ML pen     lamoTRIgine (LAMICTAL) 100 MG tablet     lisinopril (ZESTRIL) 5 MG tablet     loperamide (IMODIUM A-D) 2 MG tablet     loperamide (IMODIUM A-D) 2 MG tablet     loratadine (CLARITIN) 10 MG tablet     meloxicam (MOBIC) 15 MG tablet     omeprazole (PRILOSEC) 20 MG DR capsule     ondansetron (ZOFRAN ODT) 4 MG ODT tab     predniSONE (DELTASONE) 20 MG tablet     QUEtiapine (SEROQUEL) 25 MG tablet     topiramate (TOPAMAX) 25 MG tablet     vitamin B6 (PYRIDOXINE) 100 MG tablet     ACCU-CHEK GUIDE test strip     insulin pen needle (32G X 6 MM) 32G X 6 MM miscellaneous     insulin pen needle (ULTICARE MINI) 31G X 6 MM miscellaneous     psyllium (METAMUCIL/KONSYL) 58.6 % powder     No current facility-administered medications for this visit.     Past Medical History:   Diagnosis Date     Acute pain of left knee 03/22/2019     Acute-on-chronic kidney injury (H) 03/22/2019     ARF (acute renal failure) (H) 03/23/2019     Cervical high risk HPV (human papillomavirus) test positive 06/20/2017     Deep venous thrombosis of arm (H) 01/06/2017    ultrasound 1/6/2017-  venous thrombus in the antecubital fossa region and the left forearm as described     Depressive disorder      Depressive disorder, not elsewhere classified      DVT (deep venous thrombosis) (H)       "Esophageal reflux     GERD     Mild intermittent asthma      Other specified types of schizophrenia, unspecified condition            Review of Systems   Constitutional, HEENT, cardiovascular, pulmonary, gi and gu systems are negative, except as otherwise noted.      Objective    BP (!) 154/68   Pulse 93   Temp 98  F (36.7  C) (Tympanic)   Resp 16   Ht 1.664 m (5' 5.5\")   Wt 106.1 kg (234 lb)   SpO2 (!) 88%   BMI 38.35 kg/m    Body mass index is 38.35 kg/m .  Physical Exam   GENERAL: healthy, alert and no distress  HENT: ear canals and TM's normal, pharynx without erythema  NECK: no adenopathy, no asymmetry  RESP: lungs clear to auscultation - expiratory wheezes   CV: regular rate and rhythm, normal S1 S2, no S3 or S4, no murmur  ABDOMEN: soft, obese  MS: no gross musculoskeletal defects noted      No results found. However, due to the size of the patient record, not all encounters were searched. Please check Results Review for a complete set of results.                "

## 2023-02-21 NOTE — NURSING NOTE
"Initial BP (!) 154/68   Pulse 93   Temp 98  F (36.7  C) (Tympanic)   Resp 16   Ht 1.664 m (5' 5.5\")   Wt 106.1 kg (234 lb)   SpO2 (!) 88%   BMI 38.35 kg/m   Estimated body mass index is 38.35 kg/m  as calculated from the following:    Height as of this encounter: 1.664 m (5' 5.5\").    Weight as of this encounter: 106.1 kg (234 lb). .      "

## 2023-02-24 ENCOUNTER — HOSPITAL ENCOUNTER (EMERGENCY)
Facility: CLINIC | Age: 38
Discharge: HOME | End: 2023-02-24
Admitting: FAMILY MEDICINE
Payer: COMMERCIAL

## 2023-02-24 VITALS
SYSTOLIC BLOOD PRESSURE: 190 MMHG | HEART RATE: 88 BPM | DIASTOLIC BLOOD PRESSURE: 83 MMHG | RESPIRATION RATE: 11 BRPM | OXYGEN SATURATION: 96 %

## 2023-02-24 LAB
GLUCOSE BLDC GLUCOMTR-MCNC: 482 MG/DL (ref 70–99)
HOLD SPECIMEN: NORMAL

## 2023-02-24 PROCEDURE — 999N000104 HC STATISTIC NO CHARGE: Performed by: FAMILY MEDICINE

## 2023-02-24 PROCEDURE — 82962 GLUCOSE BLOOD TEST: CPT

## 2023-02-24 ASSESSMENT — ACTIVITIES OF DAILY LIVING (ADL): ADLS_ACUITY_SCORE: 35

## 2023-02-24 NOTE — ED TRIAGE NOTES
"   Pt states she thought she was having a panic attack with fast HR, and palpatation, that is when she called EMS.   She then checked her BG and it was \"High\" >400 by her monitor.        Pt currently pt with cough, sob, and was tested for COVID on Monday.  Pt currently c/o SOB and cough.           EMS started 18 g in L AC.  NS running.   Pt ambulated to cot, in no distress.   Denies CP or pressure, no palpations at this time.    "

## 2023-02-25 NOTE — ED NOTES
Pt up to BR and states she doesn't want to wait any longer, and denies any CP or pressure and no palpitation.   Pt states she has PCP next week and will check in with her.  
"    Pt reports she called EMS due to feeling \"palpitations\" and her HR felt fast.  Pt states she checked her BG because \"that has done it \" in the past.  Pt reports high BG \"because I didn't eat well today\", she is on sliding scale but did not take any insulin for the high BG prior to coming.  Pt reports cough and sob for a week, that is getting better, and she was COVID neg on Monday when she had it checked.  She denies fever or chill, body aches or sore throat.  Pt feels this is improving.        Cardiac:  S1, S2, systolic murmur noted.  SR on monitor, no ectopy noted.  +3 PP.   Pt denies any palpation or CP-pressure at this time.        Lungs:  CTA bilat, cough is dry      Abd:  Morbidly obese, soft, non tender, +BS x4  Pt placed on full monitors, BG POC completed.  Labs drawn via IV and sent to lab.  Pt currently very chatty and is on the phone talking to family.  PLAN:  Will await MD assessment and orders.   "
,DirectAddress_Unknown

## 2023-02-27 ENCOUNTER — PATIENT OUTREACH (OUTPATIENT)
Dept: FAMILY MEDICINE | Facility: CLINIC | Age: 38
End: 2023-02-27
Payer: COMMERCIAL

## 2023-03-09 DIAGNOSIS — G43.909 MIGRAINE WITHOUT STATUS MIGRAINOSUS, NOT INTRACTABLE, UNSPECIFIED MIGRAINE TYPE: ICD-10-CM

## 2023-03-09 RX ORDER — MULTIVITAMIN WITH IRON
100 TABLET ORAL DAILY
Qty: 30 TABLET | Refills: 2 | Status: SHIPPED | OUTPATIENT
Start: 2023-03-09 | End: 2023-08-04

## 2023-03-10 ENCOUNTER — LAB (OUTPATIENT)
Dept: LAB | Facility: CLINIC | Age: 38
End: 2023-03-10
Payer: COMMERCIAL

## 2023-03-10 DIAGNOSIS — F25.0 SCHIZOAFFECTIVE DISORDER, BIPOLAR TYPE (H): ICD-10-CM

## 2023-03-10 LAB
BASOPHILS # BLD AUTO: 0.1 10E3/UL (ref 0–0.2)
BASOPHILS NFR BLD AUTO: 1 %
EOSINOPHIL # BLD AUTO: 0.4 10E3/UL (ref 0–0.7)
EOSINOPHIL NFR BLD AUTO: 2 %
ERYTHROCYTE [DISTWIDTH] IN BLOOD BY AUTOMATED COUNT: 14.8 % (ref 10–15)
HCT VFR BLD AUTO: 39.6 % (ref 35–47)
HGB BLD-MCNC: 12 G/DL (ref 11.7–15.7)
LYMPHOCYTES # BLD AUTO: 3.9 10E3/UL (ref 0.8–5.3)
LYMPHOCYTES NFR BLD AUTO: 22 %
MCH RBC QN AUTO: 30 PG (ref 26.5–33)
MCHC RBC AUTO-ENTMCNC: 30.3 G/DL (ref 31.5–36.5)
MCV RBC AUTO: 99 FL (ref 78–100)
MONOCYTES # BLD AUTO: 1.2 10E3/UL (ref 0–1.3)
MONOCYTES NFR BLD AUTO: 7 %
NEUTROPHILS # BLD AUTO: 12.4 10E3/UL (ref 1.6–8.3)
NEUTROPHILS NFR BLD AUTO: 69 %
PLATELET # BLD AUTO: 416 10E3/UL (ref 150–450)
RBC # BLD AUTO: 4 10E6/UL (ref 3.8–5.2)
WBC # BLD AUTO: 18 10E3/UL (ref 4–11)

## 2023-03-10 PROCEDURE — 85025 COMPLETE CBC W/AUTO DIFF WBC: CPT

## 2023-03-10 PROCEDURE — 36415 COLL VENOUS BLD VENIPUNCTURE: CPT

## 2023-03-12 ENCOUNTER — MYC MEDICAL ADVICE (OUTPATIENT)
Dept: OTOLARYNGOLOGY | Facility: CLINIC | Age: 38
End: 2023-03-12
Payer: COMMERCIAL

## 2023-03-13 NOTE — TELEPHONE ENCOUNTER
Migue Antunez MD  You 7 minutes ago (1:18 PM)     I guess I prefer to look at her ear if possible.  I could see her on Wednesday, I think I have a hold spot in the afternoon.

## 2023-03-13 NOTE — TELEPHONE ENCOUNTER
Last visit 12/5/22: history of otitis externa with granulation of the tympanic membrane and recurrent drainage.    Patient completed 10 days of Ciprodex drops.   Due for follow-up visit.    Would you like any oral abx tried or should she be seen by FP?     Michael MCQUEEN RN  [facility]

## 2023-03-14 NOTE — PROGRESS NOTES
Chief Complaint   Patient presents with     Ear Problem     Recheck ears/ History of acute otitis externa- having feeling itchy, some pain, plugged - sensitive to sound     History of Present Illness  Divina Delgadillo is a 37 year old female who presents today for follow-up. The patient  has a history of otitis externa with granulation of the tympanic membrane and recurrent drainage.  She does have some retraction of the tympanic membrane but no deep retraction pockets or evidence of cholesteatoma.   I last evaluated the patient on 125/2022 with improvement in bilateral granulation and otitis externa following a course of Ciprodex drops.  The patient contacted our office with ear discomfort, pain, and drainage despite a 10-day course of Ciprodex drops.  She returns today for follow up.     Since last seeing the patient, the patient reports having some pressure, fullness, congestion, drainage.  She was having some sound sensitivity on the right-hand side.  The left ear is doing well. She did use the eardrops, but feels like she is not all the way better yet. She does intermittently have some problems of dizziness and imbalance usually during the day.  She has had ear tubes as a child but no other recent ear surgery.  No significant noise exposure history, heavy machinery, farm equipment, firearm use.  She does have intermittent tinnitus.     I did review her temporal bone CT from 11/26/2018.  There was a small soft tissue density in right Prussic's space with some significant retraction of the right tympanic membrane.  There did not really appear to be any significant erosion of the scutum.  There is opacification of the right mastoid air cells.  On the left, the tympanic membrane was severely retracted.  There is no middle ear opacification, opacification of thoracic space, or scutal erosion.  The mastoid was under aerated on the left.    Past Medical History  Patient Active Problem List   Diagnosis     Esophageal  reflux     NAFL (nonalcoholic fatty liver)     Essential hypertension     Hyperlipidemia LDL goal <100     Stage 3 chronic kidney disease     Mucinous neoplasm of pancreas     Type 2 diabetes mellitus with stage 3 chronic kidney disease, with long-term current use of insulin (H)     Bipolar disorder (H)     Cervical high risk HPV (human papillomavirus) test positive     IUD (intrauterine device) in place     Morbid obesity (H)     Mild intermittent asthma with acute exacerbation     Nicotine abuse     Chronic leukocytosis     Acquired asplenia     Borderline personality disorder (H)     PTSD (post-traumatic stress disorder)     Long term (current) use of anticoagulants     Orthostatic hypotension     Tobacco abuse     Vitamin D insufficiency     Depressive disorder     Schizoaffective disorder, depressive type (H)     Uncontrolled type 2 diabetes mellitus with diabetic polyneuropathy, with long-term current use of insulin     Cardiac murmur     History of DVT of arm  (deep vein thrombosis)     Insomnia, unspecified type     Ulnar neuropathy of both upper extremities     Current Medications    Current Outpatient Medications:      acetaminophen (TYLENOL) 325 MG tablet, Take 325-650 mg by mouth 2 tab every 4-6 hours as needed, do not exceed 9 tabs in 24hours, Disp: , Rfl:      albuterol (VENTOLIN HFA) 108 (90 Base) MCG/ACT inhaler, INHALE 2 PUFFS INTO THE LUNGS EVERY SIX  HOURS AS NEEDED FOR SHORTNESS OF BREATH, Disp: 18 g, Rfl: 10     ARIPiprazole (ABILIFY) 30 MG tablet, Take 30 mg by mouth daily, Disp: , Rfl:      atorvastatin (LIPITOR) 10 MG tablet, Take 1 tablet (10 mg) by mouth daily, Disp: 90 tablet, Rfl: 3     Biotin 5 MG TABS, Take 1 tablet by mouth daily, Disp: 60 tablet, Rfl: 1     carvedilol (COREG) 12.5 MG tablet, Take 1 tablet (12.5 mg) by mouth 2 times daily, Disp: 180 tablet, Rfl: 3     cloZAPine (CLOZARIL) 100 MG tablet, Take 50 mg by mouth 2 times daily, Disp: , Rfl:      COMPOUNDED NON-CONTROLLED  SUBSTANCE (CMPD RX) - PHARMACY TO MIX COMPOUNDED MEDICATION, Chloramphenicol 50 mg, sulfamethoxazole 50 mg, amphotericin B 5 mg, hydrocortisone 1 mg. 2-3 puffs to affected ear PRN itching/drainage, Disp: 10 capsule, Rfl: 1     Continuous Blood Gluc Sensor (DEXCOM G6 SENSOR) MISC, 1 each every 10 days. Change every 10 days. USE TO READ BLOOD SUGARS PER  INSTRUCTIONS, Disp: 9 each, Rfl: 1     Continuous Blood Gluc Transmit (DEXCOM G6 TRANSMITTER) MISC, 1 each every 3 months Change every 3 months. USE TO READ BLOOD SUGARS PER  INSTRUCTIONS, Disp: 1 each, Rfl: 1     fluocinolone acetonide 0.01 % OIL, Place 4 drops in ear(s) 2 times daily as needed (ear itching), Disp: 20 mL, Rfl: 3     FLUoxetine (PROZAC) 40 MG capsule, Take 40 mg by mouth daily, Disp: , Rfl:      glucose (BD GLUCOSE) 4 g chewable tablet, Take 1 tablet by mouth every hour as needed for low blood sugar, Disp: 30 tablet, Rfl: 4     Insulin Aspart FlexPen 100 UNIT/ML SOPN, Inject 1-14 Units Subcutaneous 3 times daily (with meals) 3 times daily (with meals) (Use Novlog on meal size small meal 15-30gm carb: 3 units, average meal 45-60gm carb: 5 units, large meal 60gm+ carb: 7 units + pre meal correction 1:40>150. Max dose per 24 hours is 50 units), Disp: 30 mL, Rfl: 1     insulin glargine (LANTUS SOLOSTAR) 100 UNIT/ML pen, Inject 32 Units Subcutaneous every morning, Disp: 15 mL, Rfl: 1     insulin pen needle (32G X 6 MM) 32G X 6 MM miscellaneous, Use 4 pen needles daily., Disp: 150 each, Rfl: 10     insulin pen needle (ULTICARE MINI) 31G X 6 MM miscellaneous, USE 1 PEN NEEDLE DAILY, Disp: 100 each, Rfl: 10     lamoTRIgine (LAMICTAL) 100 MG tablet, Take 100 mg by mouth At Bedtime, Disp: , Rfl:      lisinopril (ZESTRIL) 5 MG tablet, Take 1 tablet (5 mg) by mouth daily, Disp: 90 tablet, Rfl: 3     loperamide (IMODIUM A-D) 2 MG tablet, Take 1 tablet (2 mg) by mouth 2 times daily as needed for diarrhea, Disp: 30 tablet, Rfl: 0      loperamide (IMODIUM A-D) 2 MG tablet, 2 caplets after loose stool then 1 after each loose stool do not exceed 4 in 24hrs, Disp: , Rfl:      loratadine (CLARITIN) 10 MG tablet, TAKE ONE TABLET BY MOUTH EVERY MORNING, Disp: 28 tablet, Rfl: 10     omeprazole (PRILOSEC) 20 MG DR capsule, TAKE 1 CAPSULE BY MOUTH EVERY DAY, Disp: 28 capsule, Rfl: 2     ondansetron (ZOFRAN ODT) 4 MG ODT tab, Take 1-2 tablets (4-8 mg) by mouth every 6 hours as needed for nausea, Disp: 10 tablet, Rfl: 0     psyllium (METAMUCIL/KONSYL) 58.6 % powder, Take 6 g (1 teaspoonful) by mouth daily, Disp: 425 g, Rfl: 1     QUEtiapine (SEROQUEL) 25 MG tablet, Take 25 mg by mouth 1 tablet 4 times daily as needed for agitation or hallucinations, Disp: , Rfl:      topiramate (TOPAMAX) 25 MG tablet, Take 1 tablet (25 mg) by mouth 2 times daily, Disp: 60 tablet, Rfl: 1     vitamin B6 (PYRIDOXINE) 100 MG tablet, Take 1 tablet (100 mg) by mouth daily, Disp: 30 tablet, Rfl: 2     ACCU-CHEK GUIDE test strip, Use to test blood sugar 3 times daily or as directed. (Patient not taking: Reported on 2023), Disp: 150 strip, Rfl: 1     meloxicam (MOBIC) 15 MG tablet, Take 1 tablet (15 mg) by mouth daily for 10 days, Disp: 10 tablet, Rfl: 0    Allergies  Allergies   Allergen Reactions     Acetaminophen Other (See Comments)     pt previously tried to overdose on it, PMD does not want pt taking per pt.      Latex Rash       Social History  Social History     Socioeconomic History     Marital status: Single     Number of children: 0   Tobacco Use     Smoking status: Former     Types: Vaping Device, Cigarettes     Quit date: 2023     Years since quittin.0     Passive exposure: Yes     Smokeless tobacco: Current   Vaping Use     Vaping Use: Every day     Substances: Nicotine     Devices: Disposable   Substance and Sexual Activity     Alcohol use: No     Drug use: No     Sexual activity: Yes     Partners: Female     Birth control/protection: I.U.D.     Comment:  "Both male and female    Other Topics Concern     Parent/sibling w/ CABG, MI or angioplasty before 65F 55M? No       Family History  Family History   Problem Relation Age of Onset     Respiratory Mother         ASTHMA     Allergies Mother      Depression Mother      Chronic Obstructive Pulmonary Disease Mother      Anxiety Disorder Mother      Mental Illness Mother      Asthma Mother      Heart Disease Father         HEART VALVE REPLACEMENT     Hypertension Father      Diabetes Father      Depression Father      Anxiety Disorder Father      Mental Illness Father      Obesity Father      Respiratory Sister      Allergies Sister      Depression Sister      Respiratory Sister      Allergies Sister      Depression Sister      Respiratory Brother      Allergies Brother      Kidney Disease No family hx of        Review of Systems  As per HPI and PMHx, otherwise 10 system review including the head and neck, constitutional, eyes, respiratory, GI, skin, neurologic, lymphatic, endocrine, and allergy systems is negative.    Physical Exam  /76 (BP Location: Right arm, Patient Position: Sitting, Cuff Size: Adult Regular)   Pulse 78   Temp 98.6  F (37  C) (Tympanic)   Ht 1.664 m (5' 5.5\")   Wt 106.1 kg (234 lb)   BMI 38.35 kg/m    GENERAL: Patient is a pleasant, cooperative 37 year old female in no acute distress.  HEAD: Normocephalic, atraumatic.  Hair and scalp are normal.  EYES: Pupils are equal, round, reactive to light and accommodation.  Extraocular movements are intact.  The sclera nonicteric without injection.  The extraocular structures are normal.  EARS: See below.   NEUROLOGIC: Cranial nerves II through XII are grossly intact.  Voice is strong.  Facial nerve examination incomplete due to the patient wearing a mask.  CARDIOVASCULAR: Extremities are warm and well-perfused.  No significant peripheral edema.  RESPIRATORY: Patient has nonlabored breathing without cough, wheeze, stridor.  PSYCHIATRIC: Patient is " alert and oriented.  Mood and affect appear normal.  SKIN: Warm and dry.  No scalp, face, or neck lesions noted.     Physical Exam and Procedure     EARS: Normal shape and symmetry.  No tenderness when palpating the mastoid or tragal areas bilaterally.  The ears were then examined under the otic binocular microscope.  Otoscopic exam on the right reveals a mostly clear canal.  The right tympanic membrane is intact with some slight attic retraction.  No deep retraction pockets.  In the posterior-inferior quadrant, there is a slight amount of granularity still the tympanic membrane without reji granulation tissue or active drainage.  No significant tympanic membrane retraction or evidence of cholesteatoma.        Attention was turned to the left ear.  Otoscopic exam on the left reveals a mostly clear canal with some slight dry cerumen.  The granulation tissue has resolved.  There is no drainage or active otorrhea.  The left tympanic membrane is intact with moderately severe severe retraction anteriorly onto the middle ear structures, slight attic retraction without deep retraction pockets or evidence of cholesteatoma.    Assessment and Plan     ICD-10-CM    1. History of acute otitis externa  Z86.69 Remove Cerumen     COMPOUNDED NON-CONTROLLED SUBSTANCE (CMPD RX) - PHARMACY TO MIX COMPOUNDED MEDICATION      2. Chronic Eustachian tube dysfunction, bilateral  H69.83 Remove Cerumen     COMPOUNDED NON-CONTROLLED SUBSTANCE (CMPD RX) - PHARMACY TO MIX COMPOUNDED MEDICATION      3. Retracted tympanic membrane, bilateral  H73.893 Remove Cerumen     COMPOUNDED NON-CONTROLLED SUBSTANCE (CMPD RX) - PHARMACY TO MIX COMPOUNDED MEDICATION      4. Impacted cerumen of right ear  H61.21 Remove Cerumen     COMPOUNDED NON-CONTROLLED SUBSTANCE (CMPD RX) - PHARMACY TO MIX COMPOUNDED MEDICATION      5. Tobacco dependence syndrome  F17.200 Remove Cerumen     COMPOUNDED NON-CONTROLLED SUBSTANCE (CMPD RX) - PHARMACY TO MIX COMPOUNDED  MEDICATION      6. Encounter for tobacco use cessation counseling  Z71.6 Remove Cerumen     COMPOUNDED NON-CONTROLLED SUBSTANCE (CMPD RX) - PHARMACY TO MIX COMPOUNDED MEDICATION         It was my pleasure seeing Divina Delgadillo today in clinic.  The patient did not have any significant signs of infection, inflammation, or drainage on examination today.  She did have some cerumen overlying the right tympanic membrane that I removed today in office.  We discussed a trial of ear powder to see if that would help keep moisture out of the ears and resolve her symptoms more quickly.  I will send a prescription for ear powder to the pharmacy.  I provided her with a [employer] insufflator and gave her visual and written instructions on how to perform powder application to the ear.     I would like to see her back in 4 to 6 months to recheck the ears or sooner if her symptoms warrant.  We discussed water precautions for the ear and not placing anything in the ear except for eardrops or ear powder.    Migue Antunez MD  Department of Otolaryngology-Head and Neck Surgery  [facility]

## 2023-03-15 ENCOUNTER — OFFICE VISIT (OUTPATIENT)
Dept: OTOLARYNGOLOGY | Facility: CLINIC | Age: 38
End: 2023-03-15
Payer: COMMERCIAL

## 2023-03-15 VITALS
BODY MASS INDEX: 37.61 KG/M2 | TEMPERATURE: 98.6 F | HEIGHT: 66 IN | HEART RATE: 78 BPM | WEIGHT: 234 LBS | SYSTOLIC BLOOD PRESSURE: 138 MMHG | DIASTOLIC BLOOD PRESSURE: 76 MMHG

## 2023-03-15 DIAGNOSIS — H69.93 CHRONIC EUSTACHIAN TUBE DYSFUNCTION, BILATERAL: ICD-10-CM

## 2023-03-15 DIAGNOSIS — F17.200 TOBACCO DEPENDENCE SYNDROME: ICD-10-CM

## 2023-03-15 DIAGNOSIS — H73.893 RETRACTED TYMPANIC MEMBRANE, BILATERAL: ICD-10-CM

## 2023-03-15 DIAGNOSIS — H61.21 IMPACTED CERUMEN OF RIGHT EAR: ICD-10-CM

## 2023-03-15 DIAGNOSIS — Z71.6 ENCOUNTER FOR TOBACCO USE CESSATION COUNSELING: ICD-10-CM

## 2023-03-15 DIAGNOSIS — Z86.69 HISTORY OF ACUTE OTITIS EXTERNA: Primary | ICD-10-CM

## 2023-03-15 PROCEDURE — 99214 OFFICE O/P EST MOD 30 MIN: CPT | Mod: 25 | Performed by: OTOLARYNGOLOGY

## 2023-03-15 PROCEDURE — 69210 REMOVE IMPACTED EAR WAX UNI: CPT | Performed by: OTOLARYNGOLOGY

## 2023-03-15 NOTE — LETTER
3/15/2023         RE: Divian Delgadillo  [id number] [facility] [address]        Dear Colleague,    Thank you for referring your patient, Divina Delgadillo, to the [facility]. Please see a copy of my visit note below.    Chief Complaint   Patient presents with     Ear Problem     Recheck ears/ History of acute otitis externa- having feeling itchy, some pain, plugged - sensitive to sound     History of Present Illness  Divina Delgadillo is a 37 year old female who presents today for follow-up. The patient  has a history of otitis externa with granulation of the tympanic membrane and recurrent drainage.  She does have some retraction of the tympanic membrane but no deep retraction pockets or evidence of cholesteatoma.   I last evaluated the patient on 125/2022 with improvement in bilateral granulation and otitis externa following a course of Ciprodex drops.  The patient contacted our office with ear discomfort, pain, and drainage despite a 10-day course of Ciprodex drops.  She returns today for follow up.     Since last seeing the patient, the patient reports having some pressure, fullness, congestion, drainage.  She was having some sound sensitivity on the right-hand side.  The left ear is doing well. She did use the eardrops, but feels like she is not all the way better yet. She does intermittently have some problems of dizziness and imbalance usually during the day.  She has had ear tubes as a child but no other recent ear surgery.  No significant noise exposure history, heavy machinery, farm equipment, firearm use.  She does have intermittent tinnitus.     I did review her temporal bone CT from 11/26/2018.  There was a small soft tissue density in right Prussic's space with some significant retraction of the right tympanic membrane.  There did not really appear to be any significant erosion of the scutum.  There is opacification of the right mastoid air cells.  On the left, the  tympanic membrane was severely retracted.  There is no middle ear opacification, opacification of thoracic space, or scutal erosion.  The mastoid was under aerated on the left.    Past Medical History  Patient Active Problem List   Diagnosis     Esophageal reflux     NAFL (nonalcoholic fatty liver)     Essential hypertension     Hyperlipidemia LDL goal <100     Stage 3 chronic kidney disease     Mucinous neoplasm of pancreas     Type 2 diabetes mellitus with stage 3 chronic kidney disease, with long-term current use of insulin (H)     Bipolar disorder (H)     Cervical high risk HPV (human papillomavirus) test positive     IUD (intrauterine device) in place     Morbid obesity (H)     Mild intermittent asthma with acute exacerbation     Nicotine abuse     Chronic leukocytosis     Acquired asplenia     Borderline personality disorder (H)     PTSD (post-traumatic stress disorder)     Long term (current) use of anticoagulants     Orthostatic hypotension     Tobacco abuse     Vitamin D insufficiency     Depressive disorder     Schizoaffective disorder, depressive type (H)     Uncontrolled type 2 diabetes mellitus with diabetic polyneuropathy, with long-term current use of insulin     Cardiac murmur     History of DVT of arm  (deep vein thrombosis)     Insomnia, unspecified type     Ulnar neuropathy of both upper extremities     Current Medications    Current Outpatient Medications:      acetaminophen (TYLENOL) 325 MG tablet, Take 325-650 mg by mouth 2 tab every 4-6 hours as needed, do not exceed 9 tabs in 24hours, Disp: , Rfl:      albuterol (VENTOLIN HFA) 108 (90 Base) MCG/ACT inhaler, INHALE 2 PUFFS INTO THE LUNGS EVERY SIX  HOURS AS NEEDED FOR SHORTNESS OF BREATH, Disp: 18 g, Rfl: 10     ARIPiprazole (ABILIFY) 30 MG tablet, Take 30 mg by mouth daily, Disp: , Rfl:      atorvastatin (LIPITOR) 10 MG tablet, Take 1 tablet (10 mg) by mouth daily, Disp: 90 tablet, Rfl: 3     Biotin 5 MG TABS, Take 1 tablet by mouth daily,  Disp: 60 tablet, Rfl: 1     carvedilol (COREG) 12.5 MG tablet, Take 1 tablet (12.5 mg) by mouth 2 times daily, Disp: 180 tablet, Rfl: 3     cloZAPine (CLOZARIL) 100 MG tablet, Take 50 mg by mouth 2 times daily, Disp: , Rfl:      COMPOUNDED NON-CONTROLLED SUBSTANCE (CMPD RX) - PHARMACY TO MIX COMPOUNDED MEDICATION, Chloramphenicol 50 mg, sulfamethoxazole 50 mg, amphotericin B 5 mg, hydrocortisone 1 mg. 2-3 puffs to affected ear PRN itching/drainage, Disp: 10 capsule, Rfl: 1     Continuous Blood Gluc Sensor (DEXCOM G6 SENSOR) MISC, 1 each every 10 days. Change every 10 days. USE TO READ BLOOD SUGARS PER  INSTRUCTIONS, Disp: 9 each, Rfl: 1     Continuous Blood Gluc Transmit (DEXCOM G6 TRANSMITTER) MISC, 1 each every 3 months Change every 3 months. USE TO READ BLOOD SUGARS PER  INSTRUCTIONS, Disp: 1 each, Rfl: 1     fluocinolone acetonide 0.01 % OIL, Place 4 drops in ear(s) 2 times daily as needed (ear itching), Disp: 20 mL, Rfl: 3     FLUoxetine (PROZAC) 40 MG capsule, Take 40 mg by mouth daily, Disp: , Rfl:      glucose (BD GLUCOSE) 4 g chewable tablet, Take 1 tablet by mouth every hour as needed for low blood sugar, Disp: 30 tablet, Rfl: 4     Insulin Aspart FlexPen 100 UNIT/ML SOPN, Inject 1-14 Units Subcutaneous 3 times daily (with meals) 3 times daily (with meals) (Use Novlog on meal size small meal 15-30gm carb: 3 units, average meal 45-60gm carb: 5 units, large meal 60gm+ carb: 7 units + pre meal correction 1:40>150. Max dose per 24 hours is 50 units), Disp: 30 mL, Rfl: 1     insulin glargine (LANTUS SOLOSTAR) 100 UNIT/ML pen, Inject 32 Units Subcutaneous every morning, Disp: 15 mL, Rfl: 1     insulin pen needle (32G X 6 MM) 32G X 6 MM miscellaneous, Use 4 pen needles daily., Disp: 150 each, Rfl: 10     insulin pen needle (ULTICARE MINI) 31G X 6 MM miscellaneous, USE 1 PEN NEEDLE DAILY, Disp: 100 each, Rfl: 10     lamoTRIgine (LAMICTAL) 100 MG tablet, Take 100 mg by mouth At Bedtime,  Disp: , Rfl:      lisinopril (ZESTRIL) 5 MG tablet, Take 1 tablet (5 mg) by mouth daily, Disp: 90 tablet, Rfl: 3     loperamide (IMODIUM A-D) 2 MG tablet, Take 1 tablet (2 mg) by mouth 2 times daily as needed for diarrhea, Disp: 30 tablet, Rfl: 0     loperamide (IMODIUM A-D) 2 MG tablet, 2 caplets after loose stool then 1 after each loose stool do not exceed 4 in 24hrs, Disp: , Rfl:      loratadine (CLARITIN) 10 MG tablet, TAKE ONE TABLET BY MOUTH EVERY MORNING, Disp: 28 tablet, Rfl: 10     omeprazole (PRILOSEC) 20 MG DR capsule, TAKE 1 CAPSULE BY MOUTH EVERY DAY, Disp: 28 capsule, Rfl: 2     ondansetron (ZOFRAN ODT) 4 MG ODT tab, Take 1-2 tablets (4-8 mg) by mouth every 6 hours as needed for nausea, Disp: 10 tablet, Rfl: 0     psyllium (METAMUCIL/KONSYL) 58.6 % powder, Take 6 g (1 teaspoonful) by mouth daily, Disp: 425 g, Rfl: 1     QUEtiapine (SEROQUEL) 25 MG tablet, Take 25 mg by mouth 1 tablet 4 times daily as needed for agitation or hallucinations, Disp: , Rfl:      topiramate (TOPAMAX) 25 MG tablet, Take 1 tablet (25 mg) by mouth 2 times daily, Disp: 60 tablet, Rfl: 1     vitamin B6 (PYRIDOXINE) 100 MG tablet, Take 1 tablet (100 mg) by mouth daily, Disp: 30 tablet, Rfl: 2     ACCU-CHEK GUIDE test strip, Use to test blood sugar 3 times daily or as directed. (Patient not taking: Reported on 2023), Disp: 150 strip, Rfl: 1     meloxicam (MOBIC) 15 MG tablet, Take 1 tablet (15 mg) by mouth daily for 10 days, Disp: 10 tablet, Rfl: 0    Allergies  Allergies   Allergen Reactions     Acetaminophen Other (See Comments)     pt previously tried to overdose on it, PMD does not want pt taking per pt.      Latex Rash       Social History  Social History     Socioeconomic History     Marital status: Single     Number of children: 0   Tobacco Use     Smoking status: Former     Types: Vaping Device, Cigarettes     Quit date: 2023     Years since quittin.0     Passive exposure: Yes     Smokeless tobacco: Current  "  Vaping Use     Vaping Use: Every day     Substances: Nicotine     Devices: Disposable   Substance and Sexual Activity     Alcohol use: No     Drug use: No     Sexual activity: Yes     Partners: Female     Birth control/protection: I.U.D.     Comment: Both male and female    Other Topics Concern     Parent/sibling w/ CABG, MI or angioplasty before 65F 55M? No       Family History  Family History   Problem Relation Age of Onset     Respiratory Mother         ASTHMA     Allergies Mother      Depression Mother      Chronic Obstructive Pulmonary Disease Mother      Anxiety Disorder Mother      Mental Illness Mother      Asthma Mother      Heart Disease Father         HEART VALVE REPLACEMENT     Hypertension Father      Diabetes Father      Depression Father      Anxiety Disorder Father      Mental Illness Father      Obesity Father      Respiratory Sister      Allergies Sister      Depression Sister      Respiratory Sister      Allergies Sister      Depression Sister      Respiratory Brother      Allergies Brother      Kidney Disease No family hx of        Review of Systems  As per HPI and PMHx, otherwise 10 system review including the head and neck, constitutional, eyes, respiratory, GI, skin, neurologic, lymphatic, endocrine, and allergy systems is negative.    Physical Exam  /76 (BP Location: Right arm, Patient Position: Sitting, Cuff Size: Adult Regular)   Pulse 78   Temp 98.6  F (37  C) (Tympanic)   Ht 1.664 m (5' 5.5\")   Wt 106.1 kg (234 lb)   BMI 38.35 kg/m    GENERAL: Patient is a pleasant, cooperative 37 year old female in no acute distress.  HEAD: Normocephalic, atraumatic.  Hair and scalp are normal.  EYES: Pupils are equal, round, reactive to light and accommodation.  Extraocular movements are intact.  The sclera nonicteric without injection.  The extraocular structures are normal.  EARS: See below.   NEUROLOGIC: Cranial nerves II through XII are grossly intact.  Voice is strong.  Facial nerve " examination incomplete due to the patient wearing a mask.  CARDIOVASCULAR: Extremities are warm and well-perfused.  No significant peripheral edema.  RESPIRATORY: Patient has nonlabored breathing without cough, wheeze, stridor.  PSYCHIATRIC: Patient is alert and oriented.  Mood and affect appear normal.  SKIN: Warm and dry.  No scalp, face, or neck lesions noted.     Physical Exam and Procedure     EARS: Normal shape and symmetry.  No tenderness when palpating the mastoid or tragal areas bilaterally.  The ears were then examined under the otic binocular microscope.  Otoscopic exam on the right reveals a mostly clear canal.  The right tympanic membrane is intact with some slight attic retraction.  No deep retraction pockets.  In the posterior-inferior quadrant, there is a slight amount of granularity still the tympanic membrane without reji granulation tissue or active drainage.  No significant tympanic membrane retraction or evidence of cholesteatoma.        Attention was turned to the left ear.  Otoscopic exam on the left reveals a mostly clear canal with some slight dry cerumen.  The granulation tissue has resolved.  There is no drainage or active otorrhea.  The left tympanic membrane is intact with moderately severe severe retraction anteriorly onto the middle ear structures, slight attic retraction without deep retraction pockets or evidence of cholesteatoma.    Assessment and Plan     ICD-10-CM    1. History of acute otitis externa  Z86.69 Remove Cerumen     COMPOUNDED NON-CONTROLLED SUBSTANCE (CMPD RX) - PHARMACY TO MIX COMPOUNDED MEDICATION      2. Chronic Eustachian tube dysfunction, bilateral  H69.83 Remove Cerumen     COMPOUNDED NON-CONTROLLED SUBSTANCE (CMPD RX) - PHARMACY TO MIX COMPOUNDED MEDICATION      3. Retracted tympanic membrane, bilateral  H73.893 Remove Cerumen     COMPOUNDED NON-CONTROLLED SUBSTANCE (CMPD RX) - PHARMACY TO MIX COMPOUNDED MEDICATION      4. Impacted cerumen of right ear  H61.21  Remove Cerumen     COMPOUNDED NON-CONTROLLED SUBSTANCE (CMPD RX) - PHARMACY TO MIX COMPOUNDED MEDICATION      5. Tobacco dependence syndrome  F17.200 Remove Cerumen     COMPOUNDED NON-CONTROLLED SUBSTANCE (CMPD RX) - PHARMACY TO MIX COMPOUNDED MEDICATION      6. Encounter for tobacco use cessation counseling  Z71.6 Remove Cerumen     COMPOUNDED NON-CONTROLLED SUBSTANCE (CMPD RX) - PHARMACY TO MIX COMPOUNDED MEDICATION         It was my pleasure seeing Divina Delgadillo today in clinic.  The patient did not have any significant signs of infection, inflammation, or drainage on examination today.  She did have some cerumen overlying the right tympanic membrane that I removed today in office.  We discussed a trial of ear powder to see if that would help keep moisture out of the ears and resolve her symptoms more quickly.  I will send a prescription for ear powder to the pharmacy.  I provided her with a House-João insufflator and gave her visual and written instructions on how to perform powder application to the ear.     I would like to see her back in 4 to 6 months to recheck the ears or sooner if her symptoms warrant.  We discussed water precautions for the ear and not placing anything in the ear except for eardrops or ear powder.    Migue Antunez MD  Department of Otolaryngology-Head and Neck Surgery  [facility] [place]         Again, thank you for allowing me to participate in the care of your patient.        Sincerely,        Migue Antunez MD

## 2023-03-15 NOTE — PATIENT INSTRUCTIONS
Per physician instructions      If you have questions or concerns on any instructions given to you by your provider today or if you need to schedule an appointment, you can reach us at [phone].

## 2023-03-15 NOTE — NURSING NOTE
"Initial /76 (BP Location: Right arm, Patient Position: Sitting, Cuff Size: Adult Regular)   Pulse 78   Temp 98.6  F (37  C) (Tympanic)   Ht 1.664 m (5' 5.5\")   Wt 106.1 kg (234 lb)   BMI 38.35 kg/m   Estimated body mass index is 38.35 kg/m  as calculated from the following:    Height as of this encounter: 1.664 m (5' 5.5\").    Weight as of this encounter: 106.1 kg (234 lb). .    Elham Hernandez CMA    "

## 2023-03-22 ENCOUNTER — MYC MEDICAL ADVICE (OUTPATIENT)
Dept: FAMILY MEDICINE | Facility: CLINIC | Age: 38
End: 2023-03-22

## 2023-03-22 ENCOUNTER — OFFICE VISIT (OUTPATIENT)
Dept: FAMILY MEDICINE | Facility: CLINIC | Age: 38
End: 2023-03-22
Payer: COMMERCIAL

## 2023-03-22 VITALS
OXYGEN SATURATION: 95 % | BODY MASS INDEX: 37.61 KG/M2 | DIASTOLIC BLOOD PRESSURE: 60 MMHG | TEMPERATURE: 97.6 F | WEIGHT: 234 LBS | RESPIRATION RATE: 16 BRPM | HEIGHT: 66 IN | HEART RATE: 80 BPM | SYSTOLIC BLOOD PRESSURE: 112 MMHG

## 2023-03-22 DIAGNOSIS — R60.0 BILATERAL LEG EDEMA: ICD-10-CM

## 2023-03-22 DIAGNOSIS — E11.65 UNCONTROLLED TYPE 2 DIABETES MELLITUS WITH HYPERGLYCEMIA (H): Primary | ICD-10-CM

## 2023-03-22 DIAGNOSIS — Z00.00 ANNUAL PHYSICAL EXAM: Primary | ICD-10-CM

## 2023-03-22 DIAGNOSIS — E66.01 MORBID OBESITY (H): ICD-10-CM

## 2023-03-22 DIAGNOSIS — E11.65 UNCONTROLLED TYPE 2 DIABETES MELLITUS WITH HYPERGLYCEMIA (H): ICD-10-CM

## 2023-03-22 DIAGNOSIS — N18.31 STAGE 3A CHRONIC KIDNEY DISEASE (H): ICD-10-CM

## 2023-03-22 DIAGNOSIS — Z00.00 ENCOUNTER FOR MEDICARE ANNUAL WELLNESS EXAM: ICD-10-CM

## 2023-03-22 LAB
ANION GAP SERPL CALCULATED.3IONS-SCNC: 11 MMOL/L (ref 7–15)
BUN SERPL-MCNC: 28.4 MG/DL (ref 6–20)
CALCIUM SERPL-MCNC: 9 MG/DL (ref 8.6–10)
CHLORIDE SERPL-SCNC: 110 MMOL/L (ref 98–107)
CREAT SERPL-MCNC: 1.65 MG/DL (ref 0.51–0.95)
CREAT UR-MCNC: 164 MG/DL
DEPRECATED HCO3 PLAS-SCNC: 20 MMOL/L (ref 22–29)
GFR SERPL CREATININE-BSD FRML MDRD: 41 ML/MIN/1.73M2
GLUCOSE SERPL-MCNC: 117 MG/DL (ref 70–99)
HBA1C MFR BLD: 9.1 % (ref 0–5.6)
MICROALBUMIN UR-MCNC: 16.1 MG/L
MICROALBUMIN/CREAT UR: 9.82 MG/G CR (ref 0–25)
POTASSIUM SERPL-SCNC: 5.2 MMOL/L (ref 3.4–5.3)
SODIUM SERPL-SCNC: 141 MMOL/L (ref 136–145)

## 2023-03-22 PROCEDURE — G0009 ADMIN PNEUMOCOCCAL VACCINE: HCPCS | Performed by: NURSE PRACTITIONER

## 2023-03-22 PROCEDURE — 82043 UR ALBUMIN QUANTITATIVE: CPT | Performed by: NURSE PRACTITIONER

## 2023-03-22 PROCEDURE — 36415 COLL VENOUS BLD VENIPUNCTURE: CPT | Performed by: NURSE PRACTITIONER

## 2023-03-22 PROCEDURE — G0438 PPPS, INITIAL VISIT: HCPCS | Performed by: NURSE PRACTITIONER

## 2023-03-22 PROCEDURE — 83036 HEMOGLOBIN GLYCOSYLATED A1C: CPT | Performed by: NURSE PRACTITIONER

## 2023-03-22 PROCEDURE — 80048 BASIC METABOLIC PNL TOTAL CA: CPT | Performed by: NURSE PRACTITIONER

## 2023-03-22 PROCEDURE — 90677 PCV20 VACCINE IM: CPT | Performed by: NURSE PRACTITIONER

## 2023-03-22 PROCEDURE — 99214 OFFICE O/P EST MOD 30 MIN: CPT | Mod: 25 | Performed by: NURSE PRACTITIONER

## 2023-03-22 PROCEDURE — 82570 ASSAY OF URINE CREATININE: CPT | Performed by: NURSE PRACTITIONER

## 2023-03-22 RX ORDER — FUROSEMIDE 20 MG
20 TABLET ORAL DAILY
Qty: 5 TABLET | Refills: 0 | Status: SHIPPED | OUTPATIENT
Start: 2023-03-22 | End: 2023-06-01

## 2023-03-22 ASSESSMENT — ENCOUNTER SYMPTOMS
SORE THROAT: 0
PARESTHESIAS: 0
HEMATURIA: 0
CHILLS: 0
EYE PAIN: 1
COUGH: 1
DIARRHEA: 1
DYSURIA: 0
FREQUENCY: 0
MYALGIAS: 0
ABDOMINAL PAIN: 0
FEVER: 0
NAUSEA: 1
SHORTNESS OF BREATH: 1
ARTHRALGIAS: 0
NERVOUS/ANXIOUS: 1
PALPITATIONS: 1
DIZZINESS: 1
JOINT SWELLING: 0
WEAKNESS: 0
HEARTBURN: 0
HEMATOCHEZIA: 1
CONSTIPATION: 1

## 2023-03-22 ASSESSMENT — PAIN SCALES - GENERAL: PAINLEVEL: NO PAIN (0)

## 2023-03-22 ASSESSMENT — ACTIVITIES OF DAILY LIVING (ADL): CURRENT_FUNCTION: NO ASSISTANCE NEEDED

## 2023-03-22 NOTE — PROGRESS NOTES
"SUBJECTIVE:   Divina is a 38 year old who presents for Preventive Visit.Patient has been advised of split billing requirements and indicates understanding: Yes  Are you in the first 12 months of your Medicare coverage?  No    Healthy Habits:     In general, how would you rate your overall health?  Good    Frequency of exercise:  1 day/week    Duration of exercise:  15-30 minutes    Do you usually eat at least 4 servings of fruit and vegetables a day, include whole grains    & fiber and avoid regularly eating high fat or \"junk\" foods?  Yes    Taking medications regularly:  Yes    Medication side effects:  Lightheadedness    Ability to successfully perform activities of daily living:  No assistance needed    Home Safety:  No safety concerns identified    Hearing Impairment:  Difficulty following a conversation in a noisy restaurant or crowded room, feel that people are mumbling or not speaking clearly, need to ask people to speak up or repeat themselves and find that men's voices are easier to understand than woman's    In the past 6 months, have you been bothered by leaking of urine?  No    In general, how would you rate your overall mental or emotional health?  Fair      PHQ-2 Total Score: 0    Additional concerns today:  No    Have you ever done Advance Care Planning? (For example, a Health Directive, POLST, or a discussion with a medical provider or your loved ones about your wishes): Yes, advance care planning is on file.  Fall risk  Fallen 2 or more times in the past year?: No  Any fall with injury in the past year?: No  click delete button to remove this line now  Cognitive Screening   1) Repeat 3 items (Leader, Season, Table)    2) Clock draw: NORMAL  3) 3 item recall: Recalls 3 objects  Results: 3 items recalled: COGNITIVE IMPAIRMENT LESS LIKELY    Mini-CogTM Copyright PUNEET Martinez. Licensed by the author for use in [facility]; reprinted with permission (doug@.[facility]). All rights reserved.      Do you " have sleep apnea, excessive snoring or daytime drowsiness?: no    Reviewed and updated as needed this visit by clinical staff   Tobacco  Allergies  Meds  Problems             Reviewed and updated as needed this visit by Provider    Allergies  Meds  Problems            Social History     Tobacco Use     Smoking status: Former     Types: Vaping Device, Cigarettes     Quit date: 2023     Years since quittin.1     Passive exposure: Yes     Smokeless tobacco: Current   Substance Use Topics     Alcohol use: No     Alcohol Use 3/22/2023   Prescreen: >3 drinks/day or >7 drinks/week? No     Do you have a current opioid prescription? No  Do you use any other controlled substances or medications that are not prescribed by a provider? None    Current providers sharing in care for this patient include: Patient Care Team:  Jaleesa Velasquez APRN CNP as PCP - General (Nurse Practitioner - Gerontology)  Laci Kruse as Therapist  Patrica Vargas as Psychiatrist  [facility] as   Lucia Sascha as [facility] worker  Dloly Riley RD as Diabetes Educator (Diabetes Education)  Jaleesa Velasquez APRN CNP as Assigned PCP  Sonja Bueno PA-C as Physician Assistant (Physician Assistant)  Sánchez Dias DO as Assigned Nephrology Provider  Migue Antunez MD as Assigned Surgical Provider  Dolly Riley RD as Diabetes Educator (Dietitian, Registered)  Emanuel Mari MD as MD (Endocrinology, Diabetes, and Metabolism)  Kenji Arguelles MD as Assigned Neuroscience Provider  Leventhal, Thomas Michael, MD as Assigned Gastroenterology Provider  Dariana Westbrook MD as Assigned OBGYN Provider    The following health maintenance items are reviewed in Epic and correct as of today:  Health Maintenance   Topic Date Due     HEPATITIS B IMMUNIZATION (1 of 3 - 3-dose series) Never done     ASTHMA ACTION PLAN  2019     DIABETIC FOOT EXAM  10/24/2020     NICOTINE/TOBACCO CESSATION  "COUNSELING Q 1 YR  03/26/2022     MEDICARE ANNUAL WELLNESS VISIT  03/26/2022     EYE EXAM  12/22/2022     ASTHMA CONTROL TEST  07/02/2023     A1C  09/22/2023     LIPID  12/21/2023     ANNUAL REVIEW OF HM ORDERS  12/21/2023     HEMOGLOBIN  03/10/2024     BMP  03/22/2024     MICROALBUMIN  03/22/2024     PAP FOLLOW-UP  03/26/2024     HPV FOLLOW-UP  03/26/2024     DTAP/TDAP/TD IMMUNIZATION (3 - Td or Tdap) 01/26/2025     MENINGITIS IMMUNIZATION (3 - Risk 2-dose series) 07/06/2027     ADVANCE CARE PLANNING  03/22/2028     HEPATITIS C SCREENING  Completed     HIV SCREENING  Completed     INFLUENZA VACCINE  Completed     Pneumococcal Vaccine: Pediatrics (0 to 5 Years) and At-Risk Patients (6 to 64 Years)  Completed     URINALYSIS  Completed     COVID-19 Vaccine  Completed     IPV IMMUNIZATION  Aged Out     PAP  Discontinued       FHS-7: No flowsheet data found.  click delete button to remove this line now  Patient under 40 years of age: Routine Mammogram Screening not recommended.   Pertinent mammograms are reviewed under the imaging tab.    Constitutional, HEENT, cardiovascular, pulmonary, gi and gu systems are negative, except as otherwise noted.    OBJECTIVE:   /60 (BP Location: Left arm, Patient Position: Sitting, Cuff Size: Adult Large)   Pulse 80   Temp 97.6  F (36.4  C) (Tympanic)   Resp 16   Ht 1.664 m (5' 5.5\")   Wt 106.1 kg (234 lb)   SpO2 95%   BMI 38.35 kg/m   Estimated body mass index is 38.35 kg/m  as calculated from the following:    Height as of this encounter: 1.664 m (5' 5.5\").    Weight as of this encounter: 106.1 kg (234 lb).  Physical Exam  GENERAL: healthy, alert and no distress  EYES: Eyes grossly normal to inspection, PERRL and conjunctivae and sclerae normal  HENT: ear canals and TM's normal, nose and mouth without ulcers or lesions  NECK: no adenopathy, no asymmetry, masses, or scars and thyroid normal to palpation  RESP: lungs clear to auscultation - no rales, rhonchi or wheezes  CV: " regular rates and rhythm, normal S1 S2, no S3 or S4, grade 3/6 systolic murmur heard best over the apex, peripheral pulses strong and 2+ bilateral lower extremity pitting edema to ankles, feet and lower legs     MS: no gross musculoskeletal defects noted, no edema  SKIN: no suspicious lesions or rashes  NEURO: Normal strength and tone, mentation intact and speech normal  PSYCH: mentation appears normal, affect normal/bright    Diagnostic Test Results:  Labs reviewed in Epic  Results for orders placed or performed in visit on 03/22/23 (from the past 24 hour(s))   Basic metabolic panel  (Ca, Cl, CO2, Creat, Gluc, K, Na, BUN)   Result Value Ref Range    Sodium 141 136 - 145 mmol/L    Potassium 5.2 3.4 - 5.3 mmol/L    Chloride 110 (H) 98 - 107 mmol/L    Carbon Dioxide (CO2) 20 (L) 22 - 29 mmol/L    Anion Gap 11 7 - 15 mmol/L    Urea Nitrogen 28.4 (H) 6.0 - 20.0 mg/dL    Creatinine 1.65 (H) 0.51 - 0.95 mg/dL    Calcium 9.0 8.6 - 10.0 mg/dL    Glucose 117 (H) 70 - 99 mg/dL    GFR Estimate 41 (L) >60 mL/min/1.73m2   Hemoglobin A1c   Result Value Ref Range    Hemoglobin A1C 9.1 (H) 0.0 - 5.6 %   Albumin Random Urine Quantitative with Creat Ratio   Result Value Ref Range    Creatinine Urine mg/dL 164.0 mg/dL    Albumin Urine mg/L 16.1 mg/L    Albumin Urine mg/g Cr 9.82 0.00 - 25.00 mg/g Cr     *Note: Due to a large number of results and/or encounters for the requested time period, some results have not been displayed. A complete set of results can be found in Results Review.       ASSESSMENT / PLAN:   (Z00.00) Annual physical exam  (primary encounter diagnosis)  Comment: follow up in 3 months for diabetes recheck   Plan: PRIMARY CARE FOLLOW-UP SCHEDULING      (E11.65) Uncontrolled type 2 diabetes mellitus with hyperglycemia (H)  Comment: recommended to follow diabetic diet, work on weight loss, follow up with diabetic educator   Plan: Adult Eye  Referral, Hemoglobin A1c,         Basic metabolic panel  (Ca, Cl, CO2,  "Creat,         Gluc, K, Na, BUN)            (N18.31) Stage 3a chronic kidney disease (H)  Comment: stable  Plan: Albumin Random Urine Quantitative with Creat         Ratio, Basic metabolic panel  (Ca, Cl, CO2,         Creat, Gluc, K, Na, BUN)            (R60.0) Bilateral leg edema    Plan:  furosemide (LASIX) 20 MG tablet daily for up to 5 days, start using compression stockings     COUNSELING:  Reviewed preventive health counseling, as reflected in patient instructions       Regular exercise       Healthy diet/nutrition       Vision screening      BMI:   Estimated body mass index is 38.35 kg/m  as calculated from the following:    Height as of this encounter: 1.664 m (5' 5.5\").    Weight as of this encounter: 106.1 kg (234 lb).   Weight management plan: Discussed healthy diet and exercise guidelines      She reports that she quit smoking about 5 weeks ago. Her smoking use included vaping device and cigarettes. She has been exposed to tobacco smoke. She uses smokeless tobacco.      Appropriate preventive services were discussed with this patient, including applicable screening as appropriate for cardiovascular disease, diabetes, osteopenia/osteoporosis, and glaucoma.  As appropriate for age/gender, discussed screening for colorectal cancer, prostate cancer, breast cancer, and cervical cancer. Checklist reviewing preventive services available has been given to the patient.    Reviewed patients plan of care and provided an AVS. The Basic Care Plan (routine screening as documented in Health Maintenance) for Divina meets the Care Plan requirement. This Care Plan has been established and reviewed with the Patient.      VERONIQUE Spears [facility]         She is at risk for lack of exercise and has been provided with information to increase physical activity for the benefit of her well-being.  The patient was provided with written information regarding signs of hearing loss.  The patient " was provided with suggestions to help her develop a healthy emotional lifestyle.

## 2023-03-22 NOTE — PATIENT INSTRUCTIONS
Patient Education   Personalized Prevention Plan  You are due for the preventive services outlined below.  Your care team is available to assist you in scheduling these services.  If you have already completed any of these items, please share that information with your care team to update in your medical record.  Health Maintenance Due   Topic Date Due     Hepatitis B Vaccine (1 of 3 - 3-dose series) Never done     Asthma Action Plan - yearly  01/16/2019     Diabetic Foot Exam  10/24/2020     Talk to your care team about options to quit tobacco use.  03/26/2022     Eye Exam  12/22/2022       Exercise for a Healthier Heart  You may wonder how you can improve the health of your heart. If you re thinking about exercise, you re on the right track. You don t need to become an athlete. But you do need a certain amount of brisk exercise to help strengthen your heart. If you have been diagnosed with a heart condition, your healthcare provider may advise exercise to help your condition. To help make exercise a habit, choose safe, fun activities.      Exercise with a friend. When activity is fun, you're more likely to stick with it.     Before you start  Check with your healthcare provider before starting an exercise program. This is especially important if you haven't been active for a while. It's also important if you have a long-term (chronic) health problem such as heart disease, diabetes, or obesity. Also check with your provider if you're at high risk for having these problems.   Why exercise?  Exercising regularly offers many healthy rewards. It can help you do all of these:     Improve your blood cholesterol level to help prevent further heart trouble.    Lower your blood pressure to help prevent a stroke or heart attack.    Control diabetes or reduce your risk of getting this disease.    Improve your heart and lung function.    Reach and stay at a healthy weight.    Make your muscles stronger so you can stay  active.    Prevent falls and fractures by slowing the loss of bone mass (osteoporosis).    Manage stress better.    Improve your sense of self and your body image.  Exercise tips      Ease into your routine. Set small goals. Then build on them. Talk with your healthcare provider first before starting an exercise routine if you're not sure what your activity level should be.    Exercise on most days. Aim for a total of at least 150 minutes (2 hours and 30 minutes) or more of moderate-intensity aerobic activity each week. You could also do 75 minutes (1 hour and 15 minutes) or more of vigorous-intensity aerobic activity each week. Or try for a combination of both. Moderate activity means that you breathe heavier and your heart rate increases, but you can still talk. Think about doing at least 30 minutes of moderate exercise, 5 times a week. It's OK to work up to the 30-minute period over time. Examples of moderate-intensity activity are brisk walking, gardening, and water aerobics.    Step up your daily activity level.  Along with your exercise program, try being more active the whole day. Walk instead of drive. Or park further away so that you take more steps each day. Do more household tasks or yard work. You may not be able to meet the advised amount of physical activity. But doing some moderate- or vigorous-intensity aerobic activity can help reduce your risk for heart disease. Your healthcare provider can help you figure out what is best for you.    Choose 1 or more activities you enjoy.  Walking is one of the easiest things you can do. You can also try swimming, riding a bike, dancing, or taking an exercise class.    Call 911  Call 911 right away if any of these occur:     Chest pain that doesn't go away quickly with rest    New burning, tightness, pressure, or heaviness in your chest, neck, shoulders, back, or arms    Abnormal or severe shortness of breath    A very fast or irregular heartbeat  (palpitations)    Fainting  When to call your healthcare provider  Call your healthcare provider if you have any of these:     Dizziness or lightheadedness    Mild shortness of breath or chest pain    Increased or new joint or muscle pain    [employer] last reviewed this educational content on 7/1/2022 2000-2022 [employer]. All rights reserved. This information is not intended as a substitute for professional medical care. Always follow your healthcare professional's instructions.          Signs of Hearing Loss  Hearing loss is a problem shared by many people. In fact, it's one of the most common health problems, particularly as people age. Most people aged 65 and older have some hearing loss. By age 80, almost everyone does. Hearing loss often occurs slowly over the years. So, you may not realize your hearing has gotten worse.   When sudden hearing loss occurs, it's important to contact your healthcare provider right away. Your provider will do a medical exam and a hearing exam as soon as possible. This is to help find the cause and type of your sudden hearing loss. Based on your diagnosis, your healthcare provider will discuss possible treatments.      Hearing much better with one ear can be a sign of hearing loss.     Have your hearing checked  Call your healthcare provider if you:     Have to strain to hear normal conversation    Have to watch other people s faces very carefully to follow what they re saying    Need to ask people to repeat what they ve said    Often misunderstand what people are saying    Turn the volume of the television or radio up so high that others complain    Feel that people are mumbling when they re talking to you    Find that the effort to hear leaves you feeling tired and irritated    Notice, when using the phone, that you hear better with one ear than the other  [employer] last reviewed this educational content on 6/1/2022 2000-2022 [employer]. All  rights reserved. This information is not intended as a substitute for professional medical care. Always follow your healthcare professional's instructions.        Your Health Risk Assessment indicates you feel you are not in good emotional health.    Recreation   Recreation is not limited to sports and team events. It includes any activity that provides relaxation, interest, enjoyment, and exercise. Recreation provides an outlet for physical, mental, and social energy. It can give a sense of worth and achievement. It can help you stay healthy.    Mental Exercise and Social Involvement  Mental and emotional health is as important as physical health. Keep in touch with friends and family. Stay as active as possible. Continue to learn and challenge yourself.   Things you can do to stay mentally active are:    Learn something new, like a foreign language or musical instrument.     Play SCRABBLE or do crossword puzzles. If you cannot find people to play these games with you at home, you can play them with others on your computer through the Internet.     Join a games club--anything from card games to chess or checkers or lawn bowling.     Start a new hobby.     Go back to school.     Volunteer.     Read.   Keep up with world events.

## 2023-03-23 ENCOUNTER — MYC MEDICAL ADVICE (OUTPATIENT)
Dept: FAMILY MEDICINE | Facility: CLINIC | Age: 38
End: 2023-03-23
Payer: COMMERCIAL

## 2023-03-23 NOTE — TELEPHONE ENCOUNTER
Routed to provider.  Please see MyChart message from pt asking if she might be a candidate for an insulin pump?  Odalys Mattson RN

## 2023-03-29 ENCOUNTER — MYC MEDICAL ADVICE (OUTPATIENT)
Dept: FAMILY MEDICINE | Facility: CLINIC | Age: 38
End: 2023-03-29
Payer: COMMERCIAL

## 2023-03-30 DIAGNOSIS — Z79.4 TYPE 2 DIABETES MELLITUS WITH STAGE 3B CHRONIC KIDNEY DISEASE, WITH LONG-TERM CURRENT USE OF INSULIN (H): ICD-10-CM

## 2023-03-30 DIAGNOSIS — E11.22 TYPE 2 DIABETES MELLITUS WITH STAGE 3B CHRONIC KIDNEY DISEASE, WITH LONG-TERM CURRENT USE OF INSULIN (H): ICD-10-CM

## 2023-03-30 DIAGNOSIS — E11.42 TYPE 2 DIABETES MELLITUS WITH DIABETIC POLYNEUROPATHY, WITH LONG-TERM CURRENT USE OF INSULIN (H): ICD-10-CM

## 2023-03-30 DIAGNOSIS — N18.32 TYPE 2 DIABETES MELLITUS WITH STAGE 3B CHRONIC KIDNEY DISEASE, WITH LONG-TERM CURRENT USE OF INSULIN (H): ICD-10-CM

## 2023-03-30 DIAGNOSIS — Z79.4 TYPE 2 DIABETES MELLITUS WITH DIABETIC POLYNEUROPATHY, WITH LONG-TERM CURRENT USE OF INSULIN (H): ICD-10-CM

## 2023-03-30 RX ORDER — INSULIN GLARGINE 100 [IU]/ML
32 INJECTION, SOLUTION SUBCUTANEOUS EVERY MORNING
Qty: 45 ML | Refills: 1 | Status: SHIPPED | OUTPATIENT
Start: 2023-03-30 | End: 2023-06-01

## 2023-03-30 RX ORDER — INSULIN ASPART 100 [IU]/ML
1-14 INJECTION, SOLUTION INTRAVENOUS; SUBCUTANEOUS
Qty: 30 ML | Refills: 1 | Status: SHIPPED | OUTPATIENT
Start: 2023-03-30 | End: 2023-08-02

## 2023-04-03 ENCOUNTER — TELEPHONE (OUTPATIENT)
Dept: FAMILY MEDICINE | Facility: CLINIC | Age: 38
End: 2023-04-03
Payer: COMMERCIAL

## 2023-04-03 DIAGNOSIS — E11.42 TYPE 2 DIABETES MELLITUS WITH DIABETIC POLYNEUROPATHY (H): ICD-10-CM

## 2023-04-03 DIAGNOSIS — K21.9 GASTROESOPHAGEAL REFLUX DISEASE WITHOUT ESOPHAGITIS: ICD-10-CM

## 2023-04-03 DIAGNOSIS — G43.009 MIGRAINE WITHOUT AURA AND WITHOUT STATUS MIGRAINOSUS, NOT INTRACTABLE: ICD-10-CM

## 2023-04-03 RX ORDER — PROCHLORPERAZINE 25 MG/1
1 SUPPOSITORY RECTAL
Qty: 12 EACH | Refills: 1 | Status: SHIPPED | OUTPATIENT
Start: 2023-04-03 | End: 2023-12-01

## 2023-04-03 RX ORDER — TOPIRAMATE 25 MG/1
25 TABLET, FILM COATED ORAL 2 TIMES DAILY
Qty: 60 TABLET | Refills: 1 | Status: SHIPPED | OUTPATIENT
Start: 2023-04-03 | End: 2023-05-31

## 2023-04-03 NOTE — TELEPHONE ENCOUNTER
Pending Prescriptions:                       Disp   Refills    omeprazole (PRILOSEC) 20 MG DR capsule [P*28 cap*2            Sig: TAKE 1 CAPSULE BY MOUTH EVERY DAY    Continuous Blood Gluc Sensor (DEXCOM G6 S*       1            Si each every 10 days. Change every 10 days. USE           TO READ BLOOD SUGARS PER            INSTRUCTIONS    topiramate (TOPAMAX) 25 MG tablet [Pharma*60 tab*1            Sig: Take 1 tablet (25 mg) by mouth 2 times daily    Routing refill request to provider for review/approval because:  Anti-Seizure Meds Protocol  Failed 2023 08:04 AM   Protocol Details  Review Authorizing provider's last note.     Normal CBC on file in past 26 months     Jose De Jesus Guido RN

## 2023-04-03 NOTE — TELEPHONE ENCOUNTER
I called patient and offered her an appt for Wed  She is going to call us back.  She reports uterine, mild cramping and light spotting which is not usual for her.  She thinks her IUD may of come loose.  Pt was advised to go to UC if heavy bleeding or increasing abdominal/pelvic pain.  I also offered her UC

## 2023-04-03 NOTE — TELEPHONE ENCOUNTER
Symptoms    Describe your symptoms: patient is questioning if her IUD fell out. She reports feeling more cramping and abnormal bleeding. iud placed in 2018.    Any pain: yes cramping    How long have you been having symptoms: 1    days        Home remedies tried: tylenol    Preferred Pharmacy:       [facility] [address] [address] [address]  Phone: [phone] Fax: [phone]          Could we send this information to you in [employer] or would you prefer to receive a phone call?:   Patient would prefer a phone call   Okay to leave a detailed message?: No at Home number on file [phone] (home)

## 2023-04-04 ENCOUNTER — LAB (OUTPATIENT)
Dept: LAB | Facility: CLINIC | Age: 38
End: 2023-04-04
Payer: COMMERCIAL

## 2023-04-04 DIAGNOSIS — F25.0 SCHIZOAFFECTIVE DISORDER, BIPOLAR TYPE (H): ICD-10-CM

## 2023-04-04 LAB
BASOPHILS # BLD AUTO: 0.1 10E3/UL (ref 0–0.2)
BASOPHILS NFR BLD AUTO: 1 %
EOSINOPHIL # BLD AUTO: 0.3 10E3/UL (ref 0–0.7)
EOSINOPHIL NFR BLD AUTO: 2 %
ERYTHROCYTE [DISTWIDTH] IN BLOOD BY AUTOMATED COUNT: 14.4 % (ref 10–15)
HCT VFR BLD AUTO: 37.8 % (ref 35–47)
HGB BLD-MCNC: 11.9 G/DL (ref 11.7–15.7)
IMM GRANULOCYTES # BLD: 0.1 10E3/UL
IMM GRANULOCYTES NFR BLD: 1 %
LYMPHOCYTES # BLD AUTO: 4.2 10E3/UL (ref 0.8–5.3)
LYMPHOCYTES NFR BLD AUTO: 25 %
MCH RBC QN AUTO: 30.1 PG (ref 26.5–33)
MCHC RBC AUTO-ENTMCNC: 31.5 G/DL (ref 31.5–36.5)
MCV RBC AUTO: 96 FL (ref 78–100)
MONOCYTES # BLD AUTO: 0.9 10E3/UL (ref 0–1.3)
MONOCYTES NFR BLD AUTO: 6 %
NEUTROPHILS # BLD AUTO: 11 10E3/UL (ref 1.6–8.3)
NEUTROPHILS NFR BLD AUTO: 66 %
PLATELET # BLD AUTO: 405 10E3/UL (ref 150–450)
RBC # BLD AUTO: 3.96 10E6/UL (ref 3.8–5.2)
WBC # BLD AUTO: 16.7 10E3/UL (ref 4–11)

## 2023-04-04 PROCEDURE — 36415 COLL VENOUS BLD VENIPUNCTURE: CPT

## 2023-04-04 PROCEDURE — 85025 COMPLETE CBC W/AUTO DIFF WBC: CPT

## 2023-04-09 ENCOUNTER — MYC MEDICAL ADVICE (OUTPATIENT)
Dept: FAMILY MEDICINE | Facility: CLINIC | Age: 38
End: 2023-04-09
Payer: COMMERCIAL

## 2023-04-10 ENCOUNTER — MYC MEDICAL ADVICE (OUTPATIENT)
Dept: FAMILY MEDICINE | Facility: CLINIC | Age: 38
End: 2023-04-10
Payer: COMMERCIAL

## 2023-04-10 NOTE — TELEPHONE ENCOUNTER
See MyChart message. Writer notes that pt had preop physical on 10/19/22, with plan to have right arm surgery d/t cubital tunnel syndrome. She was not able to be cleared for surgery at that time. Writer does note that pt again had an elevated A1C on 3/22/23. Routing to PCP for review/further recommendation.    Sapphire Bejarano RN  [facility]

## 2023-04-28 ENCOUNTER — LAB (OUTPATIENT)
Dept: LAB | Facility: CLINIC | Age: 38
End: 2023-04-28
Payer: COMMERCIAL

## 2023-04-28 DIAGNOSIS — F25.0 SCHIZOAFFECTIVE DISORDER, BIPOLAR TYPE (H): ICD-10-CM

## 2023-04-28 LAB
BASOPHILS # BLD AUTO: 0.1 10E3/UL (ref 0–0.2)
BASOPHILS NFR BLD AUTO: 1 %
EOSINOPHIL # BLD AUTO: 0.4 10E3/UL (ref 0–0.7)
EOSINOPHIL NFR BLD AUTO: 2 %
ERYTHROCYTE [DISTWIDTH] IN BLOOD BY AUTOMATED COUNT: 14.9 % (ref 10–15)
HCT VFR BLD AUTO: 38.6 % (ref 35–47)
HGB BLD-MCNC: 11.7 G/DL (ref 11.7–15.7)
IMM GRANULOCYTES # BLD: 0.1 10E3/UL
IMM GRANULOCYTES NFR BLD: 1 %
LYMPHOCYTES # BLD AUTO: 4.3 10E3/UL (ref 0.8–5.3)
LYMPHOCYTES NFR BLD AUTO: 27 %
MCH RBC QN AUTO: 29.5 PG (ref 26.5–33)
MCHC RBC AUTO-ENTMCNC: 30.3 G/DL (ref 31.5–36.5)
MCV RBC AUTO: 98 FL (ref 78–100)
MONOCYTES # BLD AUTO: 0.9 10E3/UL (ref 0–1.3)
MONOCYTES NFR BLD AUTO: 5 %
NEUTROPHILS # BLD AUTO: 10.2 10E3/UL (ref 1.6–8.3)
NEUTROPHILS NFR BLD AUTO: 64 %
PLATELET # BLD AUTO: 392 10E3/UL (ref 150–450)
RBC # BLD AUTO: 3.96 10E6/UL (ref 3.8–5.2)
WBC # BLD AUTO: 16 10E3/UL (ref 4–11)

## 2023-04-28 PROCEDURE — 36415 COLL VENOUS BLD VENIPUNCTURE: CPT

## 2023-04-28 PROCEDURE — 85025 COMPLETE CBC W/AUTO DIFF WBC: CPT

## 2023-05-01 ENCOUNTER — ALLIED HEALTH/NURSE VISIT (OUTPATIENT)
Dept: EDUCATION SERVICES | Facility: CLINIC | Age: 38
End: 2023-05-01
Payer: COMMERCIAL

## 2023-05-01 DIAGNOSIS — E11.65 UNCONTROLLED TYPE 2 DIABETES MELLITUS WITH HYPERGLYCEMIA (H): ICD-10-CM

## 2023-05-01 DIAGNOSIS — E66.01 MORBID OBESITY (H): ICD-10-CM

## 2023-05-01 PROCEDURE — G0108 DIAB MANAGE TRN  PER INDIV: HCPCS | Performed by: DIETITIAN, REGISTERED

## 2023-05-01 NOTE — PROGRESS NOTES
Diabetes Self-Management Education & Support    Presents for: Follow-up  Type of Service: In Person Visit    ASSESSMENT:  Divina is doing well with the Dexcom G6 and struggles with mealtime insulin injections.  Cannot use metformin due to liver and kidney issues and was initially successful with GLP1s, however has had pancreatitis recently and stopped this medication.  Insulin has become the preferred medication.  Divina has been taking the novolog more now with higher A1c.  Has gotten in the routine of taking 7 units for most everything and when high.  Intake fluctuates significantly, sometimes can be quite high carbohydrate and other times  Divina will watch intake closely. Spent much time reviewing blood glucose action after eating in relation to what Novolog does for meals versus basal insulin.  Reminded that Novolog can and should be dosed to match meal size, have previously used 3, 5, 7 for small, medium, large meals respectively.  Discussed how variable dosing to match the meal size can help reduce the highs and lows.   Used today as an example; dexcom dropped low during visit due to taking 7 units with a small breakfast; gave apple juice and did a fingerprick which returned to 105. Reviewed how this meal could have likely only required 3 units and Divina will try this tomorrow.    Main goal is reducing hypoglycemia and controlling hyperglycemia with the novolog.   Work on carbohydrate awareness and resume food logging.  Can work in the direction of a pump with much pre pump education.     Send a One Touch meter in as a back up to the Dexcom.     Food notes:   Magic bullet - frozen fruit   Bagels in the morning  Breakfast today - two toast and 7 units and low   Lunch 7 units  Elim or maccheese          Patient's most recent   Lab Results   Component Value Date    A1C 9.1 03/22/2023    A1C 7.4 07/08/2021     is not meeting goal of <7.0    Diabetes knowledge and skills assessment:   Patient is knowledgeable in  diabetes management concepts related to: Healthy Eating, Being Active, Monitoring, Taking Medication, Problem Solving, Reducing Risks and Healthy Coping  Continue education with the following diabetes management concepts: Healthy Eating, Being Active, Monitoring, Taking Medication, Problem Solving, Reducing Risks and Healthy Coping  Based on learning assessment above, most appropriate setting for further diabetes education would be: Individual setting.      PLAN  lantus decrease from 32 units  --> 28 units   Novolog - 7units only for large meals, begin using 3 or 5 units for smaller meals.  Focus on giving 10 minutes before eating   Food logs / update with a picture on mychart   Work on getting logged into clarity   Continue with positive diet & lifestyle changes   Endocrinology in august       See Care Plan for co-developed, patient-state behavior change goals.  AVS provided for patient today.    SUBJECTIVE/OBJECTIVE:  Presents for: Follow-up  Accompanied by: Self, Mother  Diabetes education in the past 24mo: Yes  Focus of Visit: Monitoring, Reducing Risks, Taking Medication  Diabetes type: Type2, with partial pancreatectomy   Disease course: Worsening  How confident are you filling out medical forms by yourself:: Quite a bit  Other concerns:: None  Cultural Influences/Ethnic Background:  Not  or     Diabetes Symptoms & Complications:  Fatigue: Yes  Neuropathy: Yes  Polydipsia: Yes  Polyphagia: Yes  Polyuria: Yes  Visual change: No  Slow healing wounds: No  Autonomic neuropathy: Yes  CVA: No  Heart disease: No  Nephropathy: Yes  Peripheral neuropathy: Yes  Peripheral Vascular Disease: No  Retinopathy: No  Sexual dysfunction: No    Patient Problem List and Family Medical History reviewed for relevant medical history, current medical status, and diabetes risk factors.    Vitals:  There were no vitals taken for this visit.  Estimated body mass index is 38.35 kg/m  as calculated from the following:     "Height as of 3/22/23: 1.664 m (5' 5.5\").    Weight as of 3/22/23: 106.1 kg (234 lb).   Last 3 BP:   BP Readings from Last 3 Encounters:   03/22/23 112/60   03/15/23 138/76   02/24/23 (!) 190/83       History   Smoking Status     Former     Types: Vaping Device, Cigarettes     Quit date: 2/12/2023   Smokeless Tobacco     Current       Labs:  Lab Results   Component Value Date    A1C 9.1 03/22/2023    A1C 7.4 07/08/2021     Lab Results   Component Value Date     03/22/2023     02/24/2023     12/19/2022     07/08/2021     Lab Results   Component Value Date    LDL 81 12/21/2022    LDL 72 03/26/2021     HDL Cholesterol   Date Value Ref Range Status   03/26/2021 57 >49 mg/dL Final     Direct Measure HDL   Date Value Ref Range Status   12/21/2022 72 >=50 mg/dL Final   ]  GFR Estimate   Date Value Ref Range Status   03/22/2023 41 (L) >60 mL/min/1.73m2 Final     Comment:     eGFR calculated using 2021 CKD-EPI equation.   07/08/2021 52 (L) >60 mL/min/[1.73_m2] Final     Comment:     Non  GFR Calc  Starting 12/18/2018, serum creatinine based estimated GFR (eGFR) will be   calculated using the Chronic Kidney Disease Epidemiology Collaboration   (CKD-EPI) equation.       GFR Estimate If Black   Date Value Ref Range Status   07/08/2021 60 (L) >60 mL/min/[1.73_m2] Final     Comment:      GFR Calc  Starting 12/18/2018, serum creatinine based estimated GFR (eGFR) will be   calculated using the Chronic Kidney Disease Epidemiology Collaboration   (CKD-EPI) equation.       Lab Results   Component Value Date    CR 1.65 03/22/2023    CR 1.32 07/08/2021     No results found for: MICROALBUMIN    Healthy Eating:  Healthy Eating Assessed Today: Yes  Cultural/Protestant diet restrictions?: No  Meal planning/habits: None  How many times a week on average do you eat food made away from home (restaurant/take-out)?: 3    Being Active:  Being Active Assessed Today: Yes  Exercise:: " Yes  Barrier to exercise: None    Monitoring:  Monitoring Assessed Today: Yes  Blood Glucose Meter: CGM  Times checking blood sugar at home (number): 5+  Times checking blood sugar at home (per): Day  Blood glucose trend: Other      Taking Medications:  Diabetes Medication(s)     Diabetic Other       glucose (BD GLUCOSE) 4 g chewable tablet    Take 1 tablet by mouth every hour as needed for low blood sugar     Patient not taking: Reported on 3/22/2023    Insulin       Insulin Aspart FlexPen 100 UNIT/ML SOPN    Inject 1-14 Units Subcutaneous 3 times daily (with meals) 3 times daily (with meals) (Use Novlog on meal size small meal 15-30gm carb: 3 units, average meal 45-60gm carb: 5 units, large meal 60gm+ carb: 7 units + pre meal correction 1:40>150. Max dose per 24 hours is 50 units)     LANTUS SOLOSTAR 100 UNIT/ML soln    Inject 32 Units Subcutaneous every morning          Current Treatments: Insulin Injections  Dose schedule: (P) Pre-breakfast, Pre-lunch, Pre-dinner  Given by: (P) Patient    Problem Solving:                 Reducing Risks:  Reducing Risks Assessed Today: Yes  CAD Risks: Diabetes Mellitus, Family history, Hypertension, Stress  Has dilated eye exam at least once a year?: No  Sees dentist every 6 months?: No  Feet checked by healthcare provider in the last year?: No    Healthy Coping:  Healthy Coping Assessed Today: Yes  Emotional response to diabetes: Ready to learn  Informal Support system:: Family, Other, Brenda based  Stage of change: ACTION (Actively working towards change)  Patient Activation Measure Survey Score:      5/10/2018     1:00 PM   EMY Score (Last Two)   EMY Raw Score 35   Activation Score 72.1   EMY Level 3       Care Plan and Education Provided:  Care Plan: Diabetes   Updates made by Dolly Riley RD since 5/11/2023 12:00 AM      Problem: HbA1C Not In Goal       Goal: Get HbA1C Level in Goal       Task: Discuss diabetes treatment plan with patient Completed 5/11/2023    Responsible User: Dolly Riley RD      Problem: Diabetes Self-Management Education Needed to Optimize Self-Care Behaviors       Goal: Understand diabetes pathophysiology and disease progression       Task: Provide education on diabetes pathophysiology and disease progression specfic to patient's diabetes type Completed 5/11/2023   Responsible User: Dolly Riley RD      Goal: Taking Medication - patient is consistently taking medications as directed    Note:    Reviewed insulin action, novolog use, long acting insulin, bolusing before eating     Task: Discuss barriers to medication adherence with patient and provide management technique ideas as appropriate Completed 5/11/2023   Responsible User: Dolly Riley RD Elizabeth Swartout RD, LD, [facility]  Diabetes Education      Time Spent: 60 minutes  Encounter Type: Individual    Any diabetes medication dose changes were made via the CDE Protocol per the patient's referring provider. A copy of this encounter was shared with the provider.

## 2023-05-01 NOTE — PATIENT INSTRUCTIONS
3 units - small meal   5 units - medium   7 units - large meal     Decrease Lantus from 32 to 28 units     Food logs - write down meals and insulin     Dolly Riley RD, LD, [facility]  For Diabetes Education appointments call: [phone]   For Diabetes Education Related Questions call: [phone]

## 2023-05-01 NOTE — Clinical Note
5/1/2023         RE: Divina Delgadillo  [id number] [facility] [address]        Dear Colleague,    Thank you for referring your patient, Divina Delgadillo, to the [facility] [place]. Please see a copy of my visit note below.    Diabetes Self-Management Education & Support    Presents for: Follow-up  Type of Service: In Person Visit    ASSESSMENT:  Divina is doing well with the Dexcom G6 and struggles with mealtime insulin injections.  Cannot use metformin due to liver and kidney issues and was initially successful with GLP1s, however has had pancreatitis recently and stopped this medication.  Insulin has become the preferred medication.  Divina has been taking the novolog more now with higher A1c.  Has gotten in the routine of taking 7 units for most everything and when high.  Intake fluctuates significantly, sometimes can be quite high carbohydrate and other times  Divina will watch intake closely. Spent much time reviewing blood glucose action after eating in relation to what Novolog does for meals versus basal insulin.  Reminded that Novolog can and should be dosed to match meal size, have previously used 3, 5, 7 for small, medium, large meals respectively.  Discussed how variable dosing to match the meal size can help reduce the highs and lows.   Used today as an example; dexcom dropped low during visit due to taking 7 units with a small breakfast; gave apple juice and did a fingerprick which returned to 105. Reviewed how this meal could have likely only required 3 units and Divina will try this tomorrow.  Discussed     Send a One Touch meter in    Magic bullet - frozen fruit   Bagels in the mornign   Breakfast - two toast and 7 units and low   Lunch 7 units  [place] or maccheese   Dinner -     lantus 32 units 7:30am  --> 28     Novolog - 7     Patient's most recent   Lab Results   Component Value Date    A1C 9.1 03/22/2023    A1C 7.4 07/08/2021     is not meeting goal of  "<7.0    Diabetes knowledge and skills assessment:   Patient is knowledgeable in diabetes management concepts related to: {AADE 7:863997}  Continue education with the following diabetes management concepts: {AADE 7:816739}  Based on learning assessment above, most appropriate setting for further diabetes education would be: {Visit Type:282913} setting.      PLAN    ***    See Care Plan for co-developed, patient-state behavior change goals.  AVS provided for patient today.    SUBJECTIVE/OBJECTIVE:  Presents for: Follow-up  Accompanied by: Self, Mother  Diabetes education in the past 24mo: Yes  Focus of Visit: Monitoring, Reducing Risks, Taking Medication  Diabetes type: Type2, with partial pancreatectomy   Disease course: Worsening  How confident are you filling out medical forms by yourself:: Quite a bit  Other concerns:: None  Cultural Influences/Ethnic Background:  Not  or     Diabetes Symptoms & Complications:  Fatigue: Yes  Neuropathy: Yes  Polydipsia: Yes  Polyphagia: Yes  Polyuria: Yes  Visual change: No  Slow healing wounds: No  Autonomic neuropathy: Yes  CVA: No  Heart disease: No  Nephropathy: Yes  Peripheral neuropathy: Yes  Peripheral Vascular Disease: No  Retinopathy: No  Sexual dysfunction: No    Patient Problem List and Family Medical History reviewed for relevant medical history, current medical status, and diabetes risk factors.    Vitals:  There were no vitals taken for this visit.  Estimated body mass index is 38.35 kg/m  as calculated from the following:    Height as of 3/22/23: 1.664 m (5' 5.5\").    Weight as of 3/22/23: 106.1 kg (234 lb).   Last 3 BP:   BP Readings from Last 3 Encounters:   03/22/23 112/60   03/15/23 138/76   02/24/23 (!) 190/83       History   Smoking Status     Former     Types: Vaping Device, Cigarettes     Quit date: 2/12/2023   Smokeless Tobacco     Current       Labs:  Lab Results   Component Value Date    A1C 9.1 03/22/2023    A1C 7.4 07/08/2021     Lab " Results   Component Value Date     03/22/2023     02/24/2023     12/19/2022     07/08/2021     Lab Results   Component Value Date    LDL 81 12/21/2022    LDL 72 03/26/2021     HDL Cholesterol   Date Value Ref Range Status   03/26/2021 57 >49 mg/dL Final     Direct Measure HDL   Date Value Ref Range Status   12/21/2022 72 >=50 mg/dL Final   ]  GFR Estimate   Date Value Ref Range Status   03/22/2023 41 (L) >60 mL/min/1.73m2 Final     Comment:     eGFR calculated using 2021 CKD-EPI equation.   07/08/2021 52 (L) >60 mL/min/[1.73_m2] Final     Comment:     Non  GFR Calc  Starting 12/18/2018, serum creatinine based estimated GFR (eGFR) will be   calculated using the Chronic Kidney Disease Epidemiology Collaboration   (CKD-EPI) equation.       GFR Estimate If Black   Date Value Ref Range Status   07/08/2021 60 (L) >60 mL/min/[1.73_m2] Final     Comment:      GFR Calc  Starting 12/18/2018, serum creatinine based estimated GFR (eGFR) will be   calculated using the Chronic Kidney Disease Epidemiology Collaboration   (CKD-EPI) equation.       Lab Results   Component Value Date    CR 1.65 03/22/2023    CR 1.32 07/08/2021     No results found for: MICROALBUMIN    Healthy Eating:  Healthy Eating Assessed Today: Yes  Cultural/Adventism diet restrictions?: No  Meal planning/habits: None  How many times a week on average do you eat food made away from home (restaurant/take-out)?: 3    Being Active:  Being Active Assessed Today: Yes  Exercise:: Yes  Barrier to exercise: None    Monitoring:  Monitoring Assessed Today: Yes  Blood Glucose Meter: CGM  Times checking blood sugar at home (number): 5+  Times checking blood sugar at home (per): Day  Blood glucose trend: Other      Taking Medications:  Diabetes Medication(s)     Diabetic Other       glucose (BD GLUCOSE) 4 g chewable tablet    Take 1 tablet by mouth every hour as needed for low blood sugar     Patient not taking:  Reported on 3/22/2023    Insulin       Insulin Aspart FlexPen 100 UNIT/ML SOPN    Inject 1-14 Units Subcutaneous 3 times daily (with meals) 3 times daily (with meals) (Use Novlog on meal size small meal 15-30gm carb: 3 units, average meal 45-60gm carb: 5 units, large meal 60gm+ carb: 7 units + pre meal correction 1:40>150. Max dose per 24 hours is 50 units)     LANTUS SOLOSTAR 100 UNIT/ML soln    Inject 32 Units Subcutaneous every morning          Current Treatments: Insulin Injections  Dose schedule: (P) Pre-breakfast, Pre-lunch, Pre-dinner  Given by: (P) Patient    Problem Solving:                 Reducing Risks:  Reducing Risks Assessed Today: Yes  CAD Risks: Diabetes Mellitus, Family history, Hypertension, Stress  Has dilated eye exam at least once a year?: No  Sees dentist every 6 months?: No  Feet checked by healthcare provider in the last year?: No    Healthy Coping:  Healthy Coping Assessed Today: Yes  Emotional response to diabetes: Ready to learn  Informal Support system:: Family, Other, Brenda based  Stage of change: ACTION (Actively working towards change)  Patient Activation Measure Survey Score:      5/10/2018     1:00 PM   EMY Score (Last Two)   EMY Raw Score 35   Activation Score 72.1   EMY Level 3       Care Plan and Education Provided:  {Care Plan and Eduction Provided:430772}      Dolly Riley RD, LD, [facility] [place][note 2]  Diabetes Education      Time Spent: 60 minutes  Encounter Type: Individual    Any diabetes medication dose changes were made via the CDE Protocol per the patient's referring provider. A copy of this encounter was shared with the provider.      Diabetes Self-Management Education & Support    Presents for: Follow-up  Type of Service: In Person Visit    ASSESSMENT:  Divina is doing well with the Dexcom G6 and struggles with mealtime insulin injections.  Cannot use metformin due to liver and kidney issues and was initially successful with GLP1s, however has had pancreatitis recently and  stopped this medication.  Insulin has become the preferred medication.  Divina has been taking the novolog more now with higher A1c.  Has gotten in the routine of taking 7 units for most everything and when high.  Intake fluctuates significantly, sometimes can be quite high carbohydrate and other times  Divina will watch intake closely. Spent much time reviewing blood glucose action after eating in relation to what Novolog does for meals versus basal insulin.  Reminded that Novolog can and should be dosed to match meal size, have previously used 3, 5, 7 for small, medium, large meals respectively.  Discussed how variable dosing to match the meal size can help reduce the highs and lows.   Used today as an example; dexcom dropped low during visit due to taking 7 units with a small breakfast; gave apple juice and did a fingerprick which returned to 105. Reviewed how this meal could have likely only required 3 units and Divina will try this tomorrow.    Main goal is reducing hypoglycemia and controlling hyperglycemia with the novolog.   Work on carbohydrate awareness and resume food logging.  Can work in the direction of a pump with much pre pump education.     Send a One Touch meter in as a back up to the Dexcom.     Food notes:   Magic bullet - frozen fruit   Bagels in the morning  Breakfast today - two toast and 7 units and low   Lunch 7 units  [place] or maccheese          Patient's most recent   Lab Results   Component Value Date    A1C 9.1 03/22/2023    A1C 7.4 07/08/2021     is not meeting goal of <7.0    Diabetes knowledge and skills assessment:   Patient is knowledgeable in diabetes management concepts related to: Healthy Eating, Being Active, Monitoring, Taking Medication, Problem Solving, Reducing Risks and Healthy Coping  Continue education with the following diabetes management concepts: Healthy Eating, Being Active, Monitoring, Taking Medication, Problem Solving, Reducing Risks and Healthy Coping  Based on  "learning assessment above, most appropriate setting for further diabetes education would be: Individual setting.      PLAN  lantus decrease from 32 units  --> 28 units   Novolog - 7units only for large meals, begin using 3 or 5 units for smaller meals.  Focus on giving 10 minutes before eating   Food logs / update with a picture on mychart   Work on getting logged into clarity   Continue with positive diet & lifestyle changes   Endocrinology in august       See Care Plan for co-developed, patient-state behavior change goals.  AVS provided for patient today.    SUBJECTIVE/OBJECTIVE:  Presents for: Follow-up  Accompanied by: Self, Mother  Diabetes education in the past 24mo: Yes  Focus of Visit: Monitoring, Reducing Risks, Taking Medication  Diabetes type: Type2, with partial pancreatectomy   Disease course: Worsening  How confident are you filling out medical forms by yourself:: Quite a bit  Other concerns:: None  Cultural Influences/Ethnic Background:  Not  or     Diabetes Symptoms & Complications:  Fatigue: Yes  Neuropathy: Yes  Polydipsia: Yes  Polyphagia: Yes  Polyuria: Yes  Visual change: No  Slow healing wounds: No  Autonomic neuropathy: Yes  CVA: No  Heart disease: No  Nephropathy: Yes  Peripheral neuropathy: Yes  Peripheral Vascular Disease: No  Retinopathy: No  Sexual dysfunction: No    Patient Problem List and Family Medical History reviewed for relevant medical history, current medical status, and diabetes risk factors.    Vitals:  There were no vitals taken for this visit.  Estimated body mass index is 38.35 kg/m  as calculated from the following:    Height as of 3/22/23: 1.664 m (5' 5.5\").    Weight as of 3/22/23: 106.1 kg (234 lb).   Last 3 BP:   BP Readings from Last 3 Encounters:   03/22/23 112/60   03/15/23 138/76   02/24/23 (!) 190/83       History   Smoking Status     Former     Types: Vaping Device, Cigarettes     Quit date: 2/12/2023   Smokeless Tobacco     Current       Labs:  Lab " Results   Component Value Date    A1C 9.1 03/22/2023    A1C 7.4 07/08/2021     Lab Results   Component Value Date     03/22/2023     02/24/2023     12/19/2022     07/08/2021     Lab Results   Component Value Date    LDL 81 12/21/2022    LDL 72 03/26/2021     HDL Cholesterol   Date Value Ref Range Status   03/26/2021 57 >49 mg/dL Final     Direct Measure HDL   Date Value Ref Range Status   12/21/2022 72 >=50 mg/dL Final   ]  GFR Estimate   Date Value Ref Range Status   03/22/2023 41 (L) >60 mL/min/1.73m2 Final     Comment:     eGFR calculated using 2021 CKD-EPI equation.   07/08/2021 52 (L) >60 mL/min/[1.73_m2] Final     Comment:     Non  GFR Calc  Starting 12/18/2018, serum creatinine based estimated GFR (eGFR) will be   calculated using the Chronic Kidney Disease Epidemiology Collaboration   (CKD-EPI) equation.       GFR Estimate If Black   Date Value Ref Range Status   07/08/2021 60 (L) >60 mL/min/[1.73_m2] Final     Comment:      GFR Calc  Starting 12/18/2018, serum creatinine based estimated GFR (eGFR) will be   calculated using the Chronic Kidney Disease Epidemiology Collaboration   (CKD-EPI) equation.       Lab Results   Component Value Date    CR 1.65 03/22/2023    CR 1.32 07/08/2021     No results found for: MICROALBUMIN    Healthy Eating:  Healthy Eating Assessed Today: Yes  Cultural/Protestant diet restrictions?: No  Meal planning/habits: None  How many times a week on average do you eat food made away from home (restaurant/take-out)?: 3    Being Active:  Being Active Assessed Today: Yes  Exercise:: Yes  Barrier to exercise: None    Monitoring:  Monitoring Assessed Today: Yes  Blood Glucose Meter: CGM  Times checking blood sugar at home (number): 5+  Times checking blood sugar at home (per): Day  Blood glucose trend: Other      Taking Medications:  Diabetes Medication(s)     Diabetic Other       glucose (BD GLUCOSE) 4 g chewable tablet    Take 1  tablet by mouth every hour as needed for low blood sugar     Patient not taking: Reported on 3/22/2023    Insulin       Insulin Aspart FlexPen 100 UNIT/ML SOPN    Inject 1-14 Units Subcutaneous 3 times daily (with meals) 3 times daily (with meals) (Use Novlog on meal size small meal 15-30gm carb: 3 units, average meal 45-60gm carb: 5 units, large meal 60gm+ carb: 7 units + pre meal correction 1:40>150. Max dose per 24 hours is 50 units)     LANTUS SOLOSTAR 100 UNIT/ML soln    Inject 32 Units Subcutaneous every morning          Current Treatments: Insulin Injections  Dose schedule: (P) Pre-breakfast, Pre-lunch, Pre-dinner  Given by: (P) Patient    Problem Solving:                 Reducing Risks:  Reducing Risks Assessed Today: Yes  CAD Risks: Diabetes Mellitus, Family history, Hypertension, Stress  Has dilated eye exam at least once a year?: No  Sees dentist every 6 months?: No  Feet checked by healthcare provider in the last year?: No    Healthy Coping:  Healthy Coping Assessed Today: Yes  Emotional response to diabetes: Ready to learn  Informal Support system:: Family, Other, Brenda based  Stage of change: ACTION (Actively working towards change)  Patient Activation Measure Survey Score:      5/10/2018     1:00 PM   EMY Score (Last Two)   EMY Raw Score 35   Activation Score 72.1   EMY Level 3       Care Plan and Education Provided:  Care Plan: Diabetes   Updates made by Dolly Riley RD since 5/11/2023 12:00 AM      Problem: HbA1C Not In Goal       Goal: Get HbA1C Level in Goal       Task: Discuss diabetes treatment plan with patient Completed 5/11/2023   Responsible User: Dolly Riley RD      Problem: Diabetes Self-Management Education Needed to Optimize Self-Care Behaviors       Goal: Understand diabetes pathophysiology and disease progression       Task: Provide education on diabetes pathophysiology and disease progression specfic to patient's diabetes type Completed 5/11/2023   Responsible User:  Dolly Riley RD      Goal: Taking Medication - patient is consistently taking medications as directed    Note:    Reviewed insulin action, novolog use, long acting insulin, bolusing before eating     Task: Discuss barriers to medication adherence with patient and provide management technique ideas as appropriate Completed 5/11/2023   Responsible User: Dolly Riley RD Elizabeth Swartout RD, LD, [facility]  Diabetes Education      Time Spent: 60 minutes  Encounter Type: Individual    Any diabetes medication dose changes were made via the CDE Protocol per the patient's referring provider. A copy of this encounter was shared with the provider.

## 2023-05-02 DIAGNOSIS — I10 ESSENTIAL HYPERTENSION: Chronic | ICD-10-CM

## 2023-05-02 DIAGNOSIS — E11.42 TYPE 2 DIABETES MELLITUS WITH DIABETIC POLYNEUROPATHY (H): ICD-10-CM

## 2023-05-02 NOTE — TELEPHONE ENCOUNTER
"Requested Prescriptions   Pending Prescriptions Disp Refills     lisinopril (ZESTRIL) 5 MG tablet [Pharmacy Med Name: lisinopril 5 mg tablet] 90 tablet 3     Sig: Take 1 tablet (5 mg) by mouth daily       ACE Inhibitors (Including Combos) Protocol Failed - 5/2/2023  8:02 AM        Failed - Normal serum creatinine on file in past 12 months     Recent Labs   Lab Test 03/22/23  0909 09/19/17  1503 09/01/17  1049   CR 1.65*   < >  --    CREAT  --   --  1.6*    < > = values in this interval not displayed.       Ok to refill medication if creatinine is low          Passed - Blood pressure under 140/90 in past 12 months     BP Readings from Last 3 Encounters:   03/22/23 112/60   03/15/23 138/76   02/24/23 (!) 190/83                 Passed - Recent (12 mo) or future (30 days) visit within the authorizing provider's specialty     Patient has had an office visit with the authorizing provider or a provider within the authorizing providers department within the previous 12 mos or has a future within next 30 days. See \"Patient Info\" tab in inbasket, or \"Choose Columns\" in Meds & Orders section of the refill encounter.              Passed - Medication is active on med list        Passed - Patient is age 18 or older        Passed - No active pregnancy on record        Passed - Normal serum potassium on file in past 12 months     Recent Labs   Lab Test 03/22/23  0909   POTASSIUM 5.2             Passed - No positive pregnancy test within past 12 months             "

## 2023-05-03 RX ORDER — LISINOPRIL 5 MG/1
5 TABLET ORAL DAILY
Qty: 90 TABLET | Refills: 3 | Status: SHIPPED | OUTPATIENT
Start: 2023-05-03 | End: 2023-08-22

## 2023-05-08 ENCOUNTER — NURSE TRIAGE (OUTPATIENT)
Dept: FAMILY MEDICINE | Facility: CLINIC | Age: 38
End: 2023-05-08
Payer: COMMERCIAL

## 2023-05-08 NOTE — TELEPHONE ENCOUNTER
Symptoms    Describe your symptoms: left leg from upper thigh to just below the knee is numb. Woke up this way this a.m.  Almost gave out on me. Feels funny when I walk. Please advise to what I should do    Any pain: No    How long have you been having symptoms: just since today   Have you been seen for this:  Yes: have had this in the past but it usually goes away this time it is not going away. It is always the same area and same leg.    Appointment offered?: No    Triage offered?: Yes:    Home remedies tried: nothing, it usually just goes away      Could we send this information to you in [employer] or would you prefer to receive a phone call?:   Patient would prefer a phone call   Okay to leave a detailed message?: Yes at Home number on file [phone] (home)

## 2023-05-08 NOTE — TELEPHONE ENCOUNTER
Reason for Disposition    New neurologic deficit that is present NOW, sudden onset of ANY of the following: * Weakness of the face, arm, or leg on one side of the body* Numbness of the face, arm, or leg on one side of the body* Loss of speech or garbled speech    Additional Information    Negative: Confusion, disorientation, or hallucinations is main symptom    Negative: Dizziness is main symptom    Negative: Followed a head injury within last 3 days    Negative: Headache (with neurologic deficit)    Negative: Unable to urinate (or only a few drops) and bladder feels very full    Negative: Loss of bladder or bowel control (urine or bowel incontinence; wetting self, leaking stool) of new-onset    Negative: Back pain with numbness (loss of sensation) in groin or rectal area    Negative: Neurologic deficit that was brief (now gone), ANY of the following: * Weakness of the face, arm, or leg on one side of the body * Numbness of the face, arm, or leg on one side of the body * Loss of speech or garbled speech    Negative: Patient sounds very sick or weak to the triager    Negative: Neurologic deficit of gradual onset (e.g., days to weeks), ANY of the following: * Weakness of the face, arm, or leg on one side of the body* Numbness of the face, arm, or leg on one side of the body* Loss of speech or garbled speech    Negative: [place] palsy suspected (i.e., weakness only one side of the face, developing over hours to days, no other symptoms)    Negative: Tingling (e.g., pins and needles) of the face, arm or leg on one side of the body, that is present now (Exceptions: Chronic or recurrent symptom lasting > 4 weeks; or tingling from known cause, such as: bumped elbow, carpal tunnel syndrome, pinched nerve, frostbite.)    Negative: Difficult to awaken or acting confused (e.g., disoriented, slurred speech)    Negative: Sounds like a life-threatening emergency to the triager    Answer Assessment - Initial Assessment  "Questions  1. SYMPTOM: \"What is the main symptom you are concerned about?\" (e.g., weakness, numbness)      Woke up today and left anterior thigh is numb to her knee  2. ONSET: \"When did this start?\" (minutes, hours, days; while sleeping)      Woke up this morning with it.  3. LAST NORMAL: \"When was the last time you (the patient) were normal (no symptoms)?\"      yesterday  4. PATTERN \"Does this come and go, or has it been constant since it started?\"  \"Is it present now?\"      constant  5. CARDIAC SYMPTOMS: \"Have you had any of the following symptoms: chest pain, difficulty breathing, palpitations?\"      Denies.  6. NEUROLOGIC SYMPTOMS: \"Have you had any of the following symptoms: headache, dizziness, vision loss, double vision, changes in speech, unsteady on your feet?\"      denies  7. OTHER SYMPTOMS: \"Do you have any other symptoms?\"      Low back pain but the pain is unchanged and is not new.  Pt says that she ran the vacuum, swept, and mopped the floor today.  Repositioning does not make it better.  Stretching, laying, rubbing it does not improve symptoms.  8. PREGNANCY: \"Is there any chance you are pregnant?\" \"When was your last menstrual period?\"      Not asked.    Protocols used: NEUROLOGIC DEFICIT-A-[place]      "

## 2023-05-10 ENCOUNTER — OFFICE VISIT (OUTPATIENT)
Dept: PODIATRY | Facility: CLINIC | Age: 38
End: 2023-05-10
Payer: COMMERCIAL

## 2023-05-10 VITALS
BODY MASS INDEX: 37.61 KG/M2 | HEART RATE: 80 BPM | SYSTOLIC BLOOD PRESSURE: 141 MMHG | DIASTOLIC BLOOD PRESSURE: 71 MMHG | HEIGHT: 66 IN | WEIGHT: 234 LBS

## 2023-05-10 DIAGNOSIS — L60.0 INGROWN NAIL OF GREAT TOE OF RIGHT FOOT: Primary | ICD-10-CM

## 2023-05-10 PROCEDURE — 11750 EXCISION NAIL&NAIL MATRIX: CPT | Mod: T5 | Performed by: PODIATRIST

## 2023-05-10 PROCEDURE — 99213 OFFICE O/P EST LOW 20 MIN: CPT | Mod: 25 | Performed by: PODIATRIST

## 2023-05-10 RX ORDER — HYDROXYZINE HYDROCHLORIDE 25 MG/1
TABLET, FILM COATED ORAL
COMMUNITY
Start: 2023-04-20 | End: 2024-02-05

## 2023-05-10 RX ORDER — TRAZODONE HYDROCHLORIDE 50 MG/1
50 TABLET, FILM COATED ORAL PRN
COMMUNITY
Start: 2023-04-20 | End: 2024-04-24 | Stop reason: ALTCHOICE

## 2023-05-10 NOTE — PROGRESS NOTES
"Divina Delgadillo is a 38 year old female who presents with a chief complaint of a painful ingrown toenail to the right great toe.  The patient relates pain when wearing shoes.  The patient denies any redness extending up the big toe into the foot.  The patient relates the condition has been getting worse over the past several weeks.         Pertinent medical, surgical and family history was reviewed in the chart.    Vitals: BP (!) 141/71   Pulse 80   Ht 1.664 m (5' 5.5\")   Wt 106.1 kg (234 lb)   BMI 38.35 kg/m    BMI= Body mass index is 38.35 kg/m .    LOWER EXTREMITY PHYSICAL EXAM    Dermatologic: One notes an inflamed nail border of the right great toe.  There is noted erythema and edema located around the labial fold and does not extend past the interphalangeal joint.  There is no apparent purulent drainage noted.  There is pain on palpation to the proximal aspect of the nail border.  Otherwise, the skin is intact to both lower extremities without significant lesions, rash or abrasion.           Vascular: DP & PT pulses are intact & regular on the right.   CFT and skin temperature is normal to the right lower extremities.     Neurologic: Lower extremity sensation is intact to light touch.  No evidence of weakness in the right lower extremities.        Musculoskeletal: Patient is ambulatory without assistive device or brace.  No gross ankle deformity noted.  No foot or ankle joint effusion is noted.         ASSESSMENT / PLAN:     ICD-10-CM    1. Ingrown nail of great toe of right foot  L60.0 REMOVAL NAIL/NAIL BED, PARTIAL OR COMPLETE          Plan:  I have explained to Divina about the condition.  The potential causes and nature of an ingrown toenail were discussed with the patient.  We reviewed the natural history and prognosis of the condition and potential risks if no treatment is provided.  Treatment options discussed included conservative management (oral antibiotics, soaking of foot, adequate width " shoes)  as well as surgical management (partial or total nail removal).  The pros and cons of both forms as well as risks and benefits of treatment were reviewed.       At this point, I recommended having the offending nail border permanently removed from the right great toe.  I have explained to the patient all of the possible risks, benefits, alternatives to the procedure.  The patient consented to the proposed procedure.  The right hallux was swabbed with alcohol.  Next, approximately 3 cc of 1% lidocaine plain was injected around the right hallux.  The right hallux was then prepped with Betadine ointment.  A tourniquet was applied to the right hallux.  A Warwick elevator was utilized to free up the eponychium of the offending nail border.  Next, the offending nail border was split using an English anvil back to the matrix.  Next, utilizing a straight hemostat, the offending nail border was avulsed in toto.  The wound bed was debrided on any remaining nail and hyperkeratotic skin.  Next, the offending nail matrix was treated with 89% phenol using microtip cotton applicators for 30 seconds.  The wound was then irrigated and dressed with bacitracin antibiotic ointment and a compressive  sterile dressing.  The tourniquet was removed and a prompt hyperemic response was noted.  The patient tolerated the procedure well with no complications.  The patient was given post procedure instructions for the care of the wound.  The patient was informed that it is common to experience redness with watery drainage coming from the treated areas of the toe related to the phenol application.  The patient will return in two weeks for reevaluation and was instructed to notify the office if any redness extending past the big toe joint or fever with chills are experienced before then.      There is low risk of morbidity with the procedure.  There was no overlap in work associated with the evaluation/management and the work associated with  the procedure.    Divina verbalized agreement with and understanding of the rational for the diagnosis and treatment plan.  All questions were answered to best of my ability and the patient's satisfaction. The patient was advised to contact the clinic with any questions that may arise after the clinic visit.      Disclaimer: This note consists of symbols derived from keyboarding, dictation and/or voice recognition software. As a result, there may be errors in the script that have gone undetected. Please consider this when interpreting information found in this chart.      ERNA Douglas D.P.M., F.ANGELES.C.F.A.S.

## 2023-05-10 NOTE — NURSING NOTE
"Chief Complaint   Patient presents with     Ingrown Toenail     Great right toenail       Initial BP (!) 141/71   Pulse 80   Ht 1.664 m (5' 5.5\")   Wt 106.1 kg (234 lb)   BMI 38.35 kg/m   Estimated body mass index is 38.35 kg/m  as calculated from the following:    Height as of this encounter: 1.664 m (5' 5.5\").    Weight as of this encounter: 106.1 kg (234 lb).  Medications and allergies reviewed.      Pamela COOK MA    "

## 2023-05-10 NOTE — PATIENT INSTRUCTIONS
Post toenail procedure instructions:    Keep bandages on for the rest of the day; take off this evening.  Soak the foot and toe in warm water with Epsom's salt or Dreft detergent for 20 min.  Clean the toe and the treated area with a soapy wash cloth.  Apply a topical antibiotic (Bacitracin) to the treated area and cover with a Band-Aid  Repeat this morning and night for two weeks, or until the treated are resolves any redness or drainage.  Redness and drainage is normal when treated with the Phenol acid.  Notify the office if there is any redness extending up the foot or purulent drainage noted.    Any discomfort should be treated with either Ibuprofen (Advil) or Tylenol.  Please follow package instructions on amount to be taken.

## 2023-05-11 RX ORDER — LANCETS 33 GAUGE
1 EACH MISCELLANEOUS 3 TIMES DAILY
Qty: 100 EACH | Refills: 11 | Status: SHIPPED | OUTPATIENT
Start: 2023-05-11 | End: 2023-07-14

## 2023-05-11 RX ORDER — BLOOD SUGAR DIAGNOSTIC
STRIP MISCELLANEOUS
Qty: 100 STRIP | Refills: 11 | Status: SHIPPED | OUTPATIENT
Start: 2023-05-11 | End: 2024-02-05

## 2023-05-11 RX ORDER — BLOOD-GLUCOSE METER
1 EACH MISCELLANEOUS DAILY
Qty: 1 KIT | Refills: 0 | Status: SHIPPED | OUTPATIENT
Start: 2023-05-11 | End: 2024-02-05

## 2023-05-23 ENCOUNTER — PATIENT OUTREACH (OUTPATIENT)
Dept: FAMILY MEDICINE | Facility: CLINIC | Age: 38
End: 2023-05-23

## 2023-05-23 ENCOUNTER — HOSPITAL ENCOUNTER (EMERGENCY)
Facility: CLINIC | Age: 38
Discharge: HOME | End: 2023-05-23
Attending: EMERGENCY MEDICINE | Admitting: EMERGENCY MEDICINE
Payer: COMMERCIAL

## 2023-05-23 ENCOUNTER — APPOINTMENT (OUTPATIENT)
Dept: CT IMAGING | Facility: CLINIC | Age: 38
End: 2023-05-23
Attending: EMERGENCY MEDICINE
Payer: COMMERCIAL

## 2023-05-23 VITALS
DIASTOLIC BLOOD PRESSURE: 68 MMHG | OXYGEN SATURATION: 97 % | RESPIRATION RATE: 18 BRPM | HEART RATE: 81 BPM | BODY MASS INDEX: 40.12 KG/M2 | WEIGHT: 235 LBS | HEIGHT: 64 IN | SYSTOLIC BLOOD PRESSURE: 156 MMHG | TEMPERATURE: 98.9 F

## 2023-05-23 DIAGNOSIS — R42 DIZZINESS: ICD-10-CM

## 2023-05-23 DIAGNOSIS — R07.89 CHEST PRESSURE: ICD-10-CM

## 2023-05-23 DIAGNOSIS — R06.02 SOB (SHORTNESS OF BREATH): ICD-10-CM

## 2023-05-23 LAB
ALBUMIN SERPL BCG-MCNC: 3.6 G/DL (ref 3.5–5.2)
ALP SERPL-CCNC: 129 U/L (ref 35–104)
ALT SERPL W P-5'-P-CCNC: 27 U/L (ref 10–35)
ANION GAP SERPL CALCULATED.3IONS-SCNC: 11 MMOL/L (ref 7–15)
AST SERPL W P-5'-P-CCNC: 19 U/L (ref 10–35)
BASOPHILS # BLD MANUAL: 0.5 10E3/UL (ref 0–0.2)
BASOPHILS NFR BLD MANUAL: 2 %
BILIRUB DIRECT SERPL-MCNC: <0.2 MG/DL (ref 0–0.3)
BILIRUB SERPL-MCNC: <0.2 MG/DL
BUN SERPL-MCNC: 24.3 MG/DL (ref 6–20)
CALCIUM SERPL-MCNC: 8.6 MG/DL (ref 8.6–10)
CHLORIDE SERPL-SCNC: 109 MMOL/L (ref 98–107)
CREAT SERPL-MCNC: 1.33 MG/DL (ref 0.51–0.95)
D DIMER PPP FEU-MCNC: 0.6 UG/ML FEU (ref 0–0.5)
DEPRECATED HCO3 PLAS-SCNC: 20 MMOL/L (ref 22–29)
EOSINOPHIL # BLD MANUAL: 0.2 10E3/UL (ref 0–0.7)
EOSINOPHIL NFR BLD MANUAL: 1 %
ERYTHROCYTE [DISTWIDTH] IN BLOOD BY AUTOMATED COUNT: 14.3 % (ref 10–15)
GFR SERPL CREATININE-BSD FRML MDRD: 53 ML/MIN/1.73M2
GLUCOSE SERPL-MCNC: 247 MG/DL (ref 70–99)
HCT VFR BLD AUTO: 34.7 % (ref 35–47)
HGB BLD-MCNC: 10.7 G/DL (ref 11.7–15.7)
LIPASE SERPL-CCNC: 70 U/L (ref 13–60)
LYMPHOCYTES # BLD MANUAL: 6.2 10E3/UL (ref 0.8–5.3)
LYMPHOCYTES NFR BLD MANUAL: 27 %
MCH RBC QN AUTO: 30.1 PG (ref 26.5–33)
MCHC RBC AUTO-ENTMCNC: 30.8 G/DL (ref 31.5–36.5)
MCV RBC AUTO: 98 FL (ref 78–100)
MONOCYTES # BLD MANUAL: 0.9 10E3/UL (ref 0–1.3)
MONOCYTES NFR BLD MANUAL: 4 %
NEUTROPHILS # BLD MANUAL: 15.1 10E3/UL (ref 1.6–8.3)
NEUTROPHILS NFR BLD MANUAL: 66 %
NT-PROBNP SERPL-MCNC: 239 PG/ML (ref 0–450)
PLAT MORPH BLD: ABNORMAL
PLATELET # BLD AUTO: 328 10E3/UL (ref 150–450)
POTASSIUM SERPL-SCNC: 4.5 MMOL/L (ref 3.4–5.3)
PROT SERPL-MCNC: 6.9 G/DL (ref 6.4–8.3)
RBC # BLD AUTO: 3.55 10E6/UL (ref 3.8–5.2)
RBC MORPH BLD: ABNORMAL
SODIUM SERPL-SCNC: 140 MMOL/L (ref 136–145)
TROPONIN T SERPL HS-MCNC: 6 NG/L
WBC # BLD AUTO: 22.9 10E3/UL (ref 4–11)

## 2023-05-23 PROCEDURE — 93010 ELECTROCARDIOGRAM REPORT: CPT | Performed by: EMERGENCY MEDICINE

## 2023-05-23 PROCEDURE — 36415 COLL VENOUS BLD VENIPUNCTURE: CPT | Performed by: EMERGENCY MEDICINE

## 2023-05-23 PROCEDURE — 250N000011 HC RX IP 250 OP 636: Performed by: EMERGENCY MEDICINE

## 2023-05-23 PROCEDURE — 99285 EMERGENCY DEPT VISIT HI MDM: CPT | Mod: 25 | Performed by: EMERGENCY MEDICINE

## 2023-05-23 PROCEDURE — 99284 EMERGENCY DEPT VISIT MOD MDM: CPT | Mod: 25 | Performed by: EMERGENCY MEDICINE

## 2023-05-23 PROCEDURE — 85007 BL SMEAR W/DIFF WBC COUNT: CPT | Performed by: EMERGENCY MEDICINE

## 2023-05-23 PROCEDURE — 82248 BILIRUBIN DIRECT: CPT | Performed by: EMERGENCY MEDICINE

## 2023-05-23 PROCEDURE — 93005 ELECTROCARDIOGRAM TRACING: CPT | Performed by: EMERGENCY MEDICINE

## 2023-05-23 PROCEDURE — 83690 ASSAY OF LIPASE: CPT | Performed by: EMERGENCY MEDICINE

## 2023-05-23 PROCEDURE — 83880 ASSAY OF NATRIURETIC PEPTIDE: CPT | Performed by: EMERGENCY MEDICINE

## 2023-05-23 PROCEDURE — 80053 COMPREHEN METABOLIC PANEL: CPT | Performed by: EMERGENCY MEDICINE

## 2023-05-23 PROCEDURE — 85027 COMPLETE CBC AUTOMATED: CPT | Performed by: EMERGENCY MEDICINE

## 2023-05-23 PROCEDURE — 250N000009 HC RX 250: Performed by: EMERGENCY MEDICINE

## 2023-05-23 PROCEDURE — 85379 FIBRIN DEGRADATION QUANT: CPT | Performed by: EMERGENCY MEDICINE

## 2023-05-23 PROCEDURE — 84484 ASSAY OF TROPONIN QUANT: CPT | Performed by: EMERGENCY MEDICINE

## 2023-05-23 PROCEDURE — 74177 CT ABD & PELVIS W/CONTRAST: CPT

## 2023-05-23 RX ORDER — IOPAMIDOL 755 MG/ML
82 INJECTION, SOLUTION INTRAVASCULAR ONCE
Status: COMPLETED | OUTPATIENT
Start: 2023-05-23 | End: 2023-05-23

## 2023-05-23 RX ADMIN — SODIUM CHLORIDE 100 ML: 9 INJECTION, SOLUTION INTRAVENOUS at 03:37

## 2023-05-23 RX ADMIN — IOPAMIDOL 82 ML: 755 INJECTION, SOLUTION INTRAVENOUS at 03:37

## 2023-05-23 ASSESSMENT — ACTIVITIES OF DAILY LIVING (ADL): ADLS_ACUITY_SCORE: 35

## 2023-05-23 NOTE — ED PROVIDER NOTES
History     Chief Complaint   Patient presents with     Chest Pain     HPI  Divina Delgadillo is a 38 year old female who has medical history significant for schizoaffective disorder, history of prior DVT of the upper extremity secondary to OCP, hypertension, diabetes, presenting to emergency department via EMS for concerns regarding chest pressure, in addition to dizziness.  History obtained from EMS, with further additional history of information obtained from patient.  Patient began experiencing dizziness throughout the day yesterday.  Felt as if her legs were not following what the rest of her body wanted to accomplish.  She then awoke this evening with chest pressure, and associated shortness of breath.  She was feeling palpitations.  Patient reports cough which has been constant, daily in nature secondary to her vaping.  No new cough.  No lower extremity swelling.  No travel.  No fever.  Patient was given 324 mg aspirin in addition to sublingual nitroglycerin spray.  Nitroglycerin did not change pain.    Allergies:  Allergies   Allergen Reactions     Acetaminophen Other (See Comments)     pt previously tried to overdose on it, PMD does not want pt taking per pt.      Latex Rash       Problem List:    Patient Active Problem List    Diagnosis Date Noted     Ulnar neuropathy of both upper extremities 09/20/2022     Priority: Medium     Insomnia, unspecified type 08/30/2021     Priority: Medium     History of DVT of arm  (deep vein thrombosis) 01/23/2021     Priority: Medium     ultrasound 1/6/2017-  venous thrombus in the antecubital fossa region and the left forearm as described       Schizoaffective disorder, depressive type (H) 07/19/2019     Priority: Medium     Acquired asplenia 03/22/2019     Priority: Medium     Chronic leukocytosis 11/27/2018     Priority: Medium     Due to spleen removal       Nicotine abuse 08/13/2018     Priority: Medium     Uncontrolled type 2 diabetes mellitus with diabetic  "polyneuropathy, with long-term current use of insulin 01/24/2018     Priority: Medium     Mild intermittent asthma with acute exacerbation 01/16/2018     Priority: Medium     Morbid obesity (H) 01/09/2018     Priority: Medium     IUD (intrauterine device) in place 07/26/2017     Priority: Medium     Mirena IUD inserted in office with premed 7/26/2017         Cervical high risk HPV (human papillomavirus) test positive 06/20/2017     Priority: Medium     6/15/2017 Pap:NIL, +HR HPV \"other\" (not 16/18). Plan to repeat Pap+HPV in 1 year  6/14/2018 Pap: NIL/neg HPV. Plan Pap+HPV in 3 years (2021)  3/26/21 NIL pap, Neg HPV. Plan cotest in 3 years.        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, splenectomy, left adrenalectomy 02/26/2015;       Type 2 diabetes mellitus with stage 3 chronic kidney disease, with long-term current use of insulin (H) 02/27/2017     Priority: Medium     Bipolar disorder (H) 02/27/2017     Priority: Medium     Orthostatic hypotension 01/10/2017     Priority: Medium     Tobacco abuse 01/10/2017     Priority: Medium     Long term (current) use of anticoagulants 01/09/2017     Priority: Medium     Stage 3 chronic kidney disease 12/03/2015     Priority: Medium     Overview:   Updated per 10/1/17 IMO import       Vitamin D insufficiency 06/01/2015     Priority: Medium     Cardiac murmur 08/20/2013     Priority: Medium     Depressive disorder 09/15/2012     Priority: Medium     Hyperlipidemia LDL goal <100 10/31/2010     Priority: Medium     Borderline personality disorder (H) 11/06/2009     Priority: Medium     Essential hypertension 06/13/2008     Priority: Medium     NAFL (nonalcoholic fatty liver) 04/11/2006     Priority: Medium     See flowsheet for hepatic panel.   Stopped zocor, was on godon as well had right upper quandrant ultrasound and ct  ? Psych med related vs SAHNI       Esophageal " reflux 04/14/2005     Priority: Medium     GERD       PTSD (post-traumatic stress disorder) 01/01/2001     Priority: Medium        Past Medical History:    Past Medical History:   Diagnosis Date     Acute pain of left knee 03/22/2019     Acute-on-chronic kidney injury (H) 03/22/2019     ARF (acute renal failure) (H) 03/23/2019     Cervical high risk HPV (human papillomavirus) test positive 06/20/2017     Deep venous thrombosis of arm (H) 01/06/2017     Depressive disorder      Depressive disorder, not elsewhere classified      DVT (deep venous thrombosis) (H)      Esophageal reflux      Mild intermittent asthma      Other specified types of schizophrenia, unspecified condition        Past Surgical History:    Past Surgical History:   Procedure Laterality Date     ADRENALECTOMY Left 2015     BIOPSY       BREAST SURGERY  2013     COLONOSCOPY       ENT SURGERY  10/01/2000    Tympanoplasty     IR LIVER BIOPSY PERCUTANEOUS  11/14/2019     PANCREATECTOMY PARTIAL  2015     PERCUTANEOUS BIOPSY LIVER Right 11/14/2019    Procedure: Percutaneous Liver Biopsy;  Surgeon: Sean Guzman PA-C;  Location: [facility] OR     SPLENECTOMY  2015     TONSILLECTOMY  10/01/2000    Tonsillectomy       Family History:    Family History   Problem Relation Age of Onset     Respiratory Mother         ASTHMA     Allergies Mother      Depression Mother      Chronic Obstructive Pulmonary Disease Mother      Anxiety Disorder Mother      Mental Illness Mother      Asthma Mother      Heart Disease Father         HEART VALVE REPLACEMENT     Hypertension Father      Diabetes Father      Depression Father      Anxiety Disorder Father      Mental Illness Father      Obesity Father      Respiratory Sister      Allergies Sister      Depression Sister      Respiratory Sister      Allergies Sister      Depression Sister      Respiratory Brother      Allergies Brother      Kidney Disease No family hx of        Social History:  Marital Status:  Single  [1]  Social History     Tobacco Use     Smoking status: Former     Types: Vaping Device, Cigarettes     Quit date: 2023     Years since quittin.2     Passive exposure: Yes     Smokeless tobacco: Current   Vaping Use     Vaping status: Every Day     Substances: Nicotine     Devices: Disposable   Substance Use Topics     Alcohol use: No     Drug use: No        Medications:    ACCU-CHEK GUIDE test strip  acetaminophen (TYLENOL) 325 MG tablet  albuterol (VENTOLIN HFA) 108 (90 Base) MCG/ACT inhaler  ARIPiprazole (ABILIFY) 30 MG tablet  atorvastatin (LIPITOR) 10 MG tablet  Biotin 5 MG TABS  blood glucose (ONETOUCH VERIO IQ) test strip  Blood Glucose Monitoring Suppl (ONETOUCH VERIO FLEX SYSTEM) w/Device KIT  carvedilol (COREG) 12.5 MG tablet  cloZAPine (CLOZARIL) 100 MG tablet  COMPOUNDED NON-CONTROLLED SUBSTANCE (CMPD RX) - PHARMACY TO MIX COMPOUNDED MEDICATION  Continuous Blood Gluc Sensor (DEXCOM G6 SENSOR) MISC  Continuous Blood Gluc Transmit (DEXCOM G6 TRANSMITTER) MISC  fluocinolone acetonide 0.01 % OIL  FLUoxetine (PROZAC) 40 MG capsule  furosemide (LASIX) 20 MG tablet  glucose (BD GLUCOSE) 4 g chewable tablet  hydrOXYzine (ATARAX) 25 MG tablet  Insulin Aspart FlexPen 100 UNIT/ML SOPN  insulin pen needle (32G X 6 MM) 32G X 6 MM miscellaneous  insulin pen needle (ULTICARE MINI) 31G X 6 MM miscellaneous  lamoTRIgine (LAMICTAL) 100 MG tablet  LANTUS SOLOSTAR 100 UNIT/ML soln  lisinopril (ZESTRIL) 5 MG tablet  loperamide (IMODIUM A-D) 2 MG tablet  loperamide (IMODIUM A-D) 2 MG tablet  loratadine (CLARITIN) 10 MG tablet  meloxicam (MOBIC) 15 MG tablet  omeprazole (PRILOSEC) 20 MG DR capsule  ondansetron (ZOFRAN ODT) 4 MG ODT tab  OneTouch Delica Lancets 33G MISC  psyllium (METAMUCIL/KONSYL) 58.6 % powder  QUEtiapine (SEROQUEL) 25 MG tablet  topiramate (TOPAMAX) 25 MG tablet  traZODone (DESYREL) 50 MG tablet  vitamin B6 (PYRIDOXINE) 100 MG tablet          Review of Systems  See HPI  Physical Exam   BP: (!)  "167/63  Pulse: 86  Temp: 98.9  F (37.2  C)  Resp: 18  Height: 162.6 cm (5' 4\")  Weight: 106.6 kg (235 lb)  SpO2: 95 %      Physical Exam  BP (!) 156/68   Pulse 81   Temp 98.9  F (37.2  C) (Oral)   Resp 18   Ht 1.626 m (5' 4\")   Wt 106.6 kg (235 lb)   SpO2 97%   BMI 40.34 kg/m    General: alert and in no acute distress  Head: atraumatic, normocephalic  Abd: nondistended  Musculoskel/Extremities: normal extremities, no apparent edema, and full AROM of major joints  Skin: no rashes, no diaphoresis and skin color normal  Neuro: Patient awake, alert, oriented, speech is fluent,    Psychiatric: affect/mood normal,       ED Course                 Procedures         EKG, reviewed by myself shows normal sinus rhythm.  Rate 87 bpm.  Normal intervals.  No ectopy.  Nonspecific [id number] changes.  No acute ischemic appearing changes.    Critical Care time:  none               Results for orders placed or performed during the hospital encounter of 05/23/23 (from the past 24 hour(s))   CBC with platelets differential    Narrative    The following orders were created for panel order CBC with platelets differential.  Procedure                               Abnormality         Status                     ---------                               -----------         ------                     CBC with platelets and d...[[id number]]  Abnormal            Final result               Manual Differential[[id number]]          Abnormal            Final result                 Please view results for these tests on the individual orders.   D dimer quantitative   Result Value Ref Range    D-Dimer Quantitative 0.60 (H) 0.00 - 0.50 ug/mL FEU    Narrative    This D-dimer assay is intended for use in conjunction with a clinical pretest probability assessment model to exclude pulmonary embolism (PE) and deep venous thrombosis (DVT) in outpatients suspected of PE or DVT. The cut-off value is 0.50 ug/mL FEU.   Basic metabolic panel   Result Value " Ref Range    Sodium 140 136 - 145 mmol/L    Potassium 4.5 3.4 - 5.3 mmol/L    Chloride 109 (H) 98 - 107 mmol/L    Carbon Dioxide (CO2) 20 (L) 22 - 29 mmol/L    Anion Gap 11 7 - 15 mmol/L    Urea Nitrogen 24.3 (H) 6.0 - 20.0 mg/dL    Creatinine 1.33 (H) 0.51 - 0.95 mg/dL    Calcium 8.6 8.6 - 10.0 mg/dL    Glucose 247 (H) 70 - 99 mg/dL    GFR Estimate 53 (L) >60 mL/min/1.73m2   Troponin T, High Sensitivity   Result Value Ref Range    Troponin T, High Sensitivity 6 <=14 ng/L   Nt probnp inpatient (BNP)   Result Value Ref Range    N terminal Pro BNP Inpatient 239 0 - 450 pg/mL   Lipase   Result Value Ref Range    Lipase 70 (H) 13 - 60 U/L   Hepatic panel   Result Value Ref Range    Protein Total 6.9 6.4 - 8.3 g/dL    Albumin 3.6 3.5 - 5.2 g/dL    Bilirubin Total <0.2 <=1.2 mg/dL    Alkaline Phosphatase 129 (H) 35 - 104 U/L    AST 19 10 - 35 U/L    ALT 27 10 - 35 U/L    Bilirubin Direct <0.20 0.00 - 0.30 mg/dL   CBC with platelets and differential   Result Value Ref Range    WBC Count 22.9 (H) 4.0 - 11.0 10e3/uL    RBC Count 3.55 (L) 3.80 - 5.20 10e6/uL    Hemoglobin 10.7 (L) 11.7 - 15.7 g/dL    Hematocrit 34.7 (L) 35.0 - 47.0 %    MCV 98 78 - 100 fL    MCH 30.1 26.5 - 33.0 pg    MCHC 30.8 (L) 31.5 - 36.5 g/dL    RDW 14.3 10.0 - 15.0 %    Platelet Count 328 150 - 450 10e3/uL   Manual Differential   Result Value Ref Range    % Neutrophils 66 %    % Lymphocytes 27 %    % Monocytes 4 %    % Eosinophils 1 %    % Basophils 2 %    Absolute Neutrophils 15.1 (H) 1.6 - 8.3 10e3/uL    Absolute Lymphocytes 6.2 (H) 0.8 - 5.3 10e3/uL    Absolute Monocytes 0.9 0.0 - 1.3 10e3/uL    Absolute Eosinophils 0.2 0.0 - 0.7 10e3/uL    Absolute Basophils 0.5 (H) 0.0 - 0.2 10e3/uL    RBC Morphology Confirmed RBC Indices     Platelet Assessment  Automated Count Confirmed. Platelet morphology is normal.     Automated Count Confirmed. Platelet morphology is normal.   CT Chest (PE) Abdomen Pelvis w Contrast    Narrative    EXAM: CT CHEST PE ABDOMEN  PELVIS W CONTRAST  LOCATION: [facility]  DATE/TIME: 5/23/2023 3:55 AM CDT    INDICATION: chest pressure.  SOB.  cough.  Lightheaded.  Elevated WBC  COMPARISON: 01/16/2023  TECHNIQUE: CT chest pulmonary angiogram and routine CT abdomen pelvis with IV contrast. Arterial phase through the chest and venous phase through the abdomen and pelvis. Multiplanar reformats and MIP reconstructions were performed. Dose reduction   techniques were used.   CONTRAST: 82 mL Isovue 370    FINDINGS:  ANGIOGRAM CHEST: Pulmonary arteries are normal caliber and negative for pulmonary emboli. Thoracic aorta is negative for dissection. No CT evidence of right heart strain.     LUNGS AND PLEURA: Normal.    MEDIASTINUM/AXILLAE: Normal.    CORONARY ARTERY CALCIFICATION: None.    HEPATOBILIARY: Normal.    PANCREAS: Distal pancreatectomy.    SPLEEN: Splenectomy.    ADRENAL GLANDS: Normal.    KIDNEYS/BLADDER: Normal.    BOWEL: Normal.    LYMPH NODES: Normal.    VASCULATURE: Unremarkable. Retroaortic left renal vein.    PELVIC ORGANS: 3.7 cm cyst right ovary. Intrauterine device in place.    MUSCULOSKELETAL: L5 spondylolysis.      Impression    IMPRESSION:  1.  No acute process.     *Note: Due to a large number of results and/or encounters for the requested time period, some results have not been displayed. A complete set of results can be found in Results Review.       Medications   iopamidol (ISOVUE-370) solution 82 mL (82 mLs Intravenous $Given 5/23/23 0337)   sodium chloride 0.9 % bag 500mL for CT scan flush use (100 mLs Intravenous $Given 5/23/23 0337)       Assessments & Plan (with Medical Decision Making)  38 year old female presenting with concerns regarding dizziness.  Patient also with chest pressure.  Some of her symptoms began yesterday during the day, and other symptoms began during the overnight hours.  Patient arrives afebrile, normal vitals.  Differential broad, and broad work-up is performed.  Patient  with EKG which is normal/unremarkable, and troponin level is negative.  Patient without any severe chest pains.  She is young, and does have diabetes, and hypertension history, however atypical type pains, without significant pain currently.  Patient also with respiratory symptoms, and after D-dimer returned elevated, decision made for CT imaging of chest, abdomen, and pelvis.  My personal interpretation and review of images show no evidence of lobar infiltrate.  Radiology confirms no pulmonary embolism.    Remainder of labs unremarkable.  Given the normal/negative x-ray, with otherwise normal lab test, uncertain exact cause of her symptoms.  She did have elevated white blood cell count, however that value has been significantly elevated in the past as well.  No obvious etiology.    Patient will be encouraged to follow-up with her primary care provider.  Return instructions discussed if new or worsening symptoms develop.     I have reviewed the nursing notes.    I have reviewed the findings, diagnosis, plan and need for follow up with the patient.             Discharge Medication List as of 5/23/2023  4:33 AM          Final diagnoses:   Chest pressure   SOB (shortness of breath)   Dizziness       5/23/2023   [facility] EMERGENCY DEPT     Bonilla Bang MD  05/23/23 0430

## 2023-05-23 NOTE — TELEPHONE ENCOUNTER
"  ED for acute condition Discharge Protocol    \"Hi, my name is Debora Caicedo RN, a registered nurse, and I am calling from [facility].  I am calling to follow up and see how things are going for you after your recent emergency visit.\"    Tell me how you are doing now that you are home?\" I am ok feeling better      Discharge Instructions    \"Let's review your discharge instructions.  What is/are the follow-up recommendations?  Pt. Response: unsure    \"Has an appointment with your primary care provider been scheduled?\"  Yes. (confirm and remind to bring meds)    Medications    \"Tell me what changed about your medicines when you discharged?\"    none    \"What questions do you have about your medications?\"   None        Call Summary    \"What questions or concerns do you have about your recent visit and your follow-up care?\"     none    \"If you have questions or things don't continue to improve, we encourage you contact us through the main clinic number (give number).  Even if the clinic is not open, triage nurses are available 24/7 to help you.     We would like you to know that our clinic has extended hours (provide information).  We also have urgent care (provide details on closest location and hours/contact info)\"    \"Thank you for your time and take care!\"                "

## 2023-05-26 ENCOUNTER — ALLIED HEALTH/NURSE VISIT (OUTPATIENT)
Dept: FAMILY MEDICINE | Facility: CLINIC | Age: 38
End: 2023-05-26
Payer: COMMERCIAL

## 2023-05-26 ENCOUNTER — LAB (OUTPATIENT)
Dept: LAB | Facility: CLINIC | Age: 38
End: 2023-05-26
Payer: COMMERCIAL

## 2023-05-26 DIAGNOSIS — Z23 ENCOUNTER FOR IMMUNIZATION: Primary | ICD-10-CM

## 2023-05-26 DIAGNOSIS — F25.0 SCHIZOAFFECTIVE DISORDER, BIPOLAR TYPE (H): ICD-10-CM

## 2023-05-26 LAB
BASOPHILS # BLD AUTO: 0.1 10E3/UL (ref 0–0.2)
BASOPHILS NFR BLD AUTO: 1 %
EOSINOPHIL # BLD AUTO: 0.3 10E3/UL (ref 0–0.7)
EOSINOPHIL NFR BLD AUTO: 2 %
ERYTHROCYTE [DISTWIDTH] IN BLOOD BY AUTOMATED COUNT: 14.3 % (ref 10–15)
HCT VFR BLD AUTO: 37.3 % (ref 35–47)
HGB BLD-MCNC: 11.6 G/DL (ref 11.7–15.7)
IMM GRANULOCYTES # BLD: 0.2 10E3/UL
IMM GRANULOCYTES NFR BLD: 1 %
LYMPHOCYTES # BLD AUTO: 4.9 10E3/UL (ref 0.8–5.3)
LYMPHOCYTES NFR BLD AUTO: 28 %
MCH RBC QN AUTO: 30 PG (ref 26.5–33)
MCHC RBC AUTO-ENTMCNC: 31.1 G/DL (ref 31.5–36.5)
MCV RBC AUTO: 96 FL (ref 78–100)
MONOCYTES # BLD AUTO: 1.2 10E3/UL (ref 0–1.3)
MONOCYTES NFR BLD AUTO: 7 %
NEUTROPHILS # BLD AUTO: 10.7 10E3/UL (ref 1.6–8.3)
NEUTROPHILS NFR BLD AUTO: 62 %
PLATELET # BLD AUTO: 357 10E3/UL (ref 150–450)
RBC # BLD AUTO: 3.87 10E6/UL (ref 3.8–5.2)
WBC # BLD AUTO: 17.5 10E3/UL (ref 4–11)

## 2023-05-26 PROCEDURE — 85025 COMPLETE CBC W/AUTO DIFF WBC: CPT

## 2023-05-26 PROCEDURE — 99207 PR NO CHARGE NURSE ONLY: CPT

## 2023-05-26 PROCEDURE — 90746 HEPB VACCINE 3 DOSE ADULT IM: CPT

## 2023-05-26 PROCEDURE — 36415 COLL VENOUS BLD VENIPUNCTURE: CPT

## 2023-05-26 PROCEDURE — G0010 ADMIN HEPATITIS B VACCINE: HCPCS

## 2023-05-26 NOTE — NURSING NOTE
Screening Questionnaire for Adult Immunization   Are you sick today? No  Do you have allergies to medications, food, a vaccine component or latex? No  Have you ever had a serious reaction after receiving a vaccination? No  Do you have a long-term health problem with heart, lung, kidney, metabolic disease (e.g. diabetes)disease, asthma,a blood disorder, no spleen, complement component deficiency, a cochlear implant, or a spinal fluid leak?  Are you on long-term aspirin therapy? No  Do you, or does a close family member, have cancer, leukemia, HIV/AIDS, or any other immune system problem? No  In the past 3 months, have you taken medications that affect your immune system, such as cortisone, prednisone, other steroids, or anticancer drugs; drugs for the teatment of rheumatoid arthritis, Crohn's disease, pr psoriasis;  or have you had radiation treatments? No  Have you had a seizure, or a brain or other nervous system problem? No  During the past year, have you received a transfusion of blood or blood products, or been given immune (gamma) globulin or antiviral drug? No  For women: Are you pregnant or is there a chance you could become pregnant during the next month? No  Have you received any vaccinations in the past 4 weeks? No    Immunization questionnaire answers were all negative.        Per orders of Dr. Odalys Oliva, injection of Heb B given by Devi Porras MA. Patient instructed to remain in clinic for 20 minutes afterwards, and to report any adverse reaction to me immediately.       Screening performed by Devi Porras MA on 5/26/2023 at 2:20 PM.

## 2023-05-28 ENCOUNTER — MYC MEDICAL ADVICE (OUTPATIENT)
Dept: FAMILY MEDICINE | Facility: CLINIC | Age: 38
End: 2023-05-28
Payer: COMMERCIAL

## 2023-05-28 ENCOUNTER — NURSE TRIAGE (OUTPATIENT)
Dept: NURSING | Facility: CLINIC | Age: 38
End: 2023-05-28
Payer: COMMERCIAL

## 2023-05-29 NOTE — TELEPHONE ENCOUNTER
Nurse Triage SBAR    Situation: High blood sugar    Background: Patient calling.     Assessment: Blood sugar is 400 or above - 384 for another meter. She was at a barbeque and not paying attention to what she was eating. Dizziness. Not feeling well. Thinking seems slow. Shakiness. Tried. Feeling very weak. Her speech seems slurred compared to earlier according to a friend.     Protocol Recommended Disposition: Call 911    Recommendation: According to the protocol, Patient should Call 911. Advised Patient that the patient needs to Call 911. Care advice given. Patient verbalizes understanding and agrees with plan of care.     Bianca Iglesias, RN Nursing Advisor 5/28/2023 7:31 PM     Reason for Disposition    Very weak (e.g., can't stand)    Acting confused (e.g., disoriented, slurred speech)    Additional Information    Negative: Unconscious or difficult to awaken    Protocols used: DIABETES - HIGH BLOOD SUGAR-A-

## 2023-05-30 DIAGNOSIS — G43.009 MIGRAINE WITHOUT AURA AND WITHOUT STATUS MIGRAINOSUS, NOT INTRACTABLE: ICD-10-CM

## 2023-05-30 DIAGNOSIS — K21.9 GASTROESOPHAGEAL REFLUX DISEASE WITHOUT ESOPHAGITIS: ICD-10-CM

## 2023-05-30 NOTE — TELEPHONE ENCOUNTER
See LxDATAhart message, pt has an appt on 6/8/23 for unrelated issues. Writer contacted pt. She says that these symptoms are chronic, not new per pt and denies any other neurological s/s; however, reports that her meds don't seem to be helping. Says that she drinks a lot of water. Routing to PCP for review/recommendation; ok to discuss at currently scheduled appointment on 6/8/23, or should she be seen sooner?    Sapphire Bejarano RN  [facility]

## 2023-05-31 RX ORDER — TOPIRAMATE 25 MG/1
25 TABLET, FILM COATED ORAL 2 TIMES DAILY
Qty: 60 TABLET | Refills: 1 | Status: SHIPPED | OUTPATIENT
Start: 2023-05-31 | End: 2023-06-01

## 2023-05-31 NOTE — TELEPHONE ENCOUNTER
Pending Prescriptions:                       Disp   Refills    omeprazole (PRILOSEC) 20 MG DR capsule [Ph*28 cap*2        Sig: TAKE 1 CAPSULE BY MOUTH EVERY DAY    topiramate (TOPAMAX) 25 MG tablet [Pharmac*60 tab*1        Sig: Take 1 tablet (25 mg) by mouth 2 times daily    Routing refill request to provider for review/approval because:  Labs out of range:  CBC however these appear to be ordered by other care providers.    Odalys Mattson RN

## 2023-06-01 ENCOUNTER — OFFICE VISIT (OUTPATIENT)
Dept: FAMILY MEDICINE | Facility: CLINIC | Age: 38
End: 2023-06-01
Payer: COMMERCIAL

## 2023-06-01 VITALS
HEART RATE: 84 BPM | RESPIRATION RATE: 18 BRPM | TEMPERATURE: 98.7 F | WEIGHT: 233.9 LBS | OXYGEN SATURATION: 97 % | SYSTOLIC BLOOD PRESSURE: 138 MMHG | BODY MASS INDEX: 40.15 KG/M2 | DIASTOLIC BLOOD PRESSURE: 58 MMHG

## 2023-06-01 DIAGNOSIS — G43.719 INTRACTABLE CHRONIC MIGRAINE WITHOUT AURA AND WITHOUT STATUS MIGRAINOSUS: ICD-10-CM

## 2023-06-01 DIAGNOSIS — G43.009 MIGRAINE WITHOUT AURA AND WITHOUT STATUS MIGRAINOSUS, NOT INTRACTABLE: ICD-10-CM

## 2023-06-01 DIAGNOSIS — Z79.4 TYPE 2 DIABETES MELLITUS WITH DIABETIC POLYNEUROPATHY, WITH LONG-TERM CURRENT USE OF INSULIN (H): Primary | ICD-10-CM

## 2023-06-01 DIAGNOSIS — R42 DIZZINESS: ICD-10-CM

## 2023-06-01 DIAGNOSIS — E66.01 MORBID OBESITY (H): Chronic | ICD-10-CM

## 2023-06-01 DIAGNOSIS — E11.42 TYPE 2 DIABETES MELLITUS WITH DIABETIC POLYNEUROPATHY, WITH LONG-TERM CURRENT USE OF INSULIN (H): Primary | ICD-10-CM

## 2023-06-01 DIAGNOSIS — J30.2 CHRONIC SEASONAL ALLERGIC RHINITIS: ICD-10-CM

## 2023-06-01 LAB
ANION GAP SERPL CALCULATED.3IONS-SCNC: 11 MMOL/L (ref 7–15)
BUN SERPL-MCNC: 33.7 MG/DL (ref 6–20)
CALCIUM SERPL-MCNC: 9.3 MG/DL (ref 8.6–10)
CHLORIDE SERPL-SCNC: 112 MMOL/L (ref 98–107)
CREAT SERPL-MCNC: 1.64 MG/DL (ref 0.51–0.95)
DEPRECATED HCO3 PLAS-SCNC: 21 MMOL/L (ref 22–29)
GFR SERPL CREATININE-BSD FRML MDRD: 41 ML/MIN/1.73M2
GLUCOSE SERPL-MCNC: 142 MG/DL (ref 70–99)
HBA1C MFR BLD: 9.2 % (ref 0–5.6)
POTASSIUM SERPL-SCNC: 4.9 MMOL/L (ref 3.4–5.3)
SODIUM SERPL-SCNC: 144 MMOL/L (ref 136–145)

## 2023-06-01 PROCEDURE — 99214 OFFICE O/P EST MOD 30 MIN: CPT | Performed by: NURSE PRACTITIONER

## 2023-06-01 PROCEDURE — 80048 BASIC METABOLIC PNL TOTAL CA: CPT | Performed by: NURSE PRACTITIONER

## 2023-06-01 PROCEDURE — 83036 HEMOGLOBIN GLYCOSYLATED A1C: CPT | Performed by: NURSE PRACTITIONER

## 2023-06-01 PROCEDURE — 36415 COLL VENOUS BLD VENIPUNCTURE: CPT | Performed by: NURSE PRACTITIONER

## 2023-06-01 RX ORDER — INSULIN GLARGINE 100 [IU]/ML
34 INJECTION, SOLUTION SUBCUTANEOUS EVERY MORNING
Qty: 45 ML | Refills: 1 | Status: SHIPPED | OUTPATIENT
Start: 2023-06-01 | End: 2023-08-31

## 2023-06-01 RX ORDER — TOPIRAMATE 50 MG/1
50 TABLET, FILM COATED ORAL 2 TIMES DAILY
Qty: 60 TABLET | Refills: 3 | Status: SHIPPED | OUTPATIENT
Start: 2023-06-01 | End: 2023-10-02

## 2023-06-01 RX ORDER — LORATADINE 10 MG/1
1 TABLET ORAL EVERY MORNING
Qty: 28 TABLET | Refills: 10 | Status: SHIPPED | OUTPATIENT
Start: 2023-06-01 | End: 2024-02-05

## 2023-06-01 ASSESSMENT — PATIENT HEALTH QUESTIONNAIRE - PHQ9
SUM OF ALL RESPONSES TO PHQ QUESTIONS 1-9: 2
10. IF YOU CHECKED OFF ANY PROBLEMS, HOW DIFFICULT HAVE THESE PROBLEMS MADE IT FOR YOU TO DO YOUR WORK, TAKE CARE OF THINGS AT HOME, OR GET ALONG WITH OTHER PEOPLE: SOMEWHAT DIFFICULT

## 2023-06-01 ASSESSMENT — PAIN SCALES - GENERAL: PAINLEVEL: NO PAIN (0)

## 2023-06-01 NOTE — PROGRESS NOTES
Assessment & Plan     Type 2 diabetes mellitus with diabetic polyneuropathy, with long-term current use of insulin (H)  -uncontrolled  -recommended to increase Lantus to 34 units daily  -follow up with endocrinologist and DM educator   -recommended to work on weight loss   - Adult Eye  Referral; Future  - Hemoglobin A1c; Future  - Basic metabolic panel  (Ca, Cl, CO2, Creat, Gluc, K, Na, BUN); Future  - Hemoglobin A1c  - Basic metabolic panel  (Ca, Cl, CO2, Creat, Gluc, K, Na, BUN)  - insulin glargine (LANTUS SOLOSTAR) 100 UNIT/ML pen; Inject 34 Units Subcutaneous every morning    Chronic seasonal allergic rhinitis    - loratadine (CLARITIN) 10 MG tablet; Take 1 tablet (10 mg) by mouth every morning    Morbid obesity (H)  -recommended to work on weight loss    Dizziness  -complains of episodic dizziness and headaches  - CT Head w/o Contrast; Future    Intractable chronic migraine without aura and without status migrainosus  -patient feels that headaches are worse now, recommended head CT skin to rule out any intracranial pathology, possible meningioma as the cause of headaches. Patient prefers CT scan vs MRI since she is severely claustrophobic   -recommended to try higher dose of Tomapax 50 mg twice daily   - CT Head w/o Contrast; Future  - topiramate (TOPAMAX) 50 MG tablet; Take 1 tablet (50 mg) by mouth 2 times daily    Migraine without aura and without status migrainosus, not intractable    - topiramate (TOPAMAX) 50 MG tablet; Take 1 tablet (50 mg) by mouth 2 times daily    VERONIQUE Spears [facility]    Rhiannon Murcia is a 38 year old, presenting for the following health issues:  RECHECK (E R Follow up )      History of Present Illness       Reason for visit:  Feeling dizzy and disoriented    She eats 0-1 servings of fruits and vegetables daily.She consumes 1 sweetened beverage(s) daily.She exercises with enough effort to increase her heart rate 10 to 19 minutes  per day.  She exercises with enough effort to increase her heart rate 5 days per week.   She is taking medications regularly.    Today's PHQ-9         PHQ-9 Total Score: 2    PHQ-9 Q9 Thoughts of better off dead/self-harm past 2 weeks :   Several days  Thoughts of suicide or self harm: (P) Yes  Self-harm Plan:   (P) Yes  Self-harm Action:     (P) No  Safety concerns for self or others: (P) No    How difficult have these problems made it for you to do your work, take care of things at home, or get along with other people: Somewhat difficult     ED/UC Followup:    Facility:  [facility]   Date of visit: 5/23/23   Reason for visit: Chest Pressure; SOB ; Dizziness   Current Status: patient is not having chest pressure and SOB, but is still experiencing dizziness. Patient reports headaches, throbbing in the back of her head.    Review of Systems   Constitutional, HEENT, cardiovascular, pulmonary, gi and gu systems are negative, except as otherwise noted.      Objective    /58 (BP Location: Left arm, Patient Position: Sitting, Cuff Size: Adult Large)   Pulse 84   Temp 98.7  F (37.1  C) (Tympanic)   Resp 18   Wt 106.1 kg (233 lb 14.4 oz)   SpO2 97%   BMI 40.15 kg/m    Body mass index is 40.15 kg/m .  Physical Exam   GENERAL: healthy, alert and no distress  EYES: Eyes grossly normal to inspection, PERRL and conjunctivae and sclerae normal  RESP: lungs clear to auscultation - no rales, rhonchi or wheezes  CV: regular rates and rhythm, normal S1 S2, no S3 or S4, no murmur, click or rub, peripheral pulses strong and 1+ bilateral lower extremity pitting edema to LE    MS: no gross musculoskeletal defects noted, no edema  SKIN: no suspicious lesions or rashes  NEURO: Normal strength and tone, mentation intact and speech normal  PSYCH: mentation appears normal, affect normal/bright    Results for orders placed or performed in visit on 06/01/23   Hemoglobin A1c     Status: Abnormal   Result Value Ref Range    Hemoglobin A1C  9.2 (H) 0.0 - 5.6 %   Basic metabolic panel  (Ca, Cl, CO2, Creat, Gluc, K, Na, BUN)     Status: Abnormal   Result Value Ref Range    Sodium 144 136 - 145 mmol/L    Potassium 4.9 3.4 - 5.3 mmol/L    Chloride 112 (H) 98 - 107 mmol/L    Carbon Dioxide (CO2) 21 (L) 22 - 29 mmol/L    Anion Gap 11 7 - 15 mmol/L    Urea Nitrogen 33.7 (H) 6.0 - 20.0 mg/dL    Creatinine 1.64 (H) 0.51 - 0.95 mg/dL    Calcium 9.3 8.6 - 10.0 mg/dL    Glucose 142 (H) 70 - 99 mg/dL    GFR Estimate 41 (L) >60 mL/min/1.73m2

## 2023-06-01 NOTE — TELEPHONE ENCOUNTER
Attempted to reach pt but no answer and unable to leave a message.  Mailbox is not set up.    Sent pt MyChart reminder of today's appt.    Odalys Mattson RN

## 2023-06-01 NOTE — PATIENT INSTRUCTIONS
Wt Readings from Last 2 Encounters:   06/01/23 106.1 kg (233 lb 14.4 oz)   05/23/23 106.6 kg (235 lb)      Divina try to work on weight loss    Lab Results   Component Value Date    A1C 9.1 03/22/2023    A1C 8.6 12/21/2022    A1C 9.5 10/26/2022    A1C 9.6 10/19/2022    A1C 9.2 06/17/2022    A1C 7.4 07/08/2021    A1C 8.0 03/26/2021    A1C 9.0 08/14/2020    A1C 7.8 05/18/2020    A1C 7.4 02/17/2020

## 2023-06-07 ENCOUNTER — HOSPITAL ENCOUNTER (OUTPATIENT)
Dept: CT IMAGING | Facility: CLINIC | Age: 38
Discharge: HOME | End: 2023-06-07
Attending: NURSE PRACTITIONER | Admitting: NURSE PRACTITIONER
Payer: COMMERCIAL

## 2023-06-07 DIAGNOSIS — R42 DIZZINESS: ICD-10-CM

## 2023-06-07 DIAGNOSIS — G43.719 INTRACTABLE CHRONIC MIGRAINE WITHOUT AURA AND WITHOUT STATUS MIGRAINOSUS: ICD-10-CM

## 2023-06-07 PROCEDURE — 70450 CT HEAD/BRAIN W/O DYE: CPT

## 2023-06-12 DIAGNOSIS — G93.5 CHIARI MALFORMATION TYPE I (H): Primary | ICD-10-CM

## 2023-06-21 ENCOUNTER — MYC MEDICAL ADVICE (OUTPATIENT)
Dept: FAMILY MEDICINE | Facility: CLINIC | Age: 38
End: 2023-06-21
Payer: COMMERCIAL

## 2023-06-21 DIAGNOSIS — Z79.4 TYPE 2 DIABETES MELLITUS WITH DIABETIC POLYNEUROPATHY, WITH LONG-TERM CURRENT USE OF INSULIN (H): Primary | ICD-10-CM

## 2023-06-21 DIAGNOSIS — E11.42 TYPE 2 DIABETES MELLITUS WITH DIABETIC POLYNEUROPATHY, WITH LONG-TERM CURRENT USE OF INSULIN (H): Primary | ICD-10-CM

## 2023-06-23 ENCOUNTER — MYC MEDICAL ADVICE (OUTPATIENT)
Dept: FAMILY MEDICINE | Facility: CLINIC | Age: 38
End: 2023-06-23
Payer: COMMERCIAL

## 2023-06-26 NOTE — TELEPHONE ENCOUNTER
Reached out to patient via phone regarding neurology f/u needed r/t CT results.  No answer and voicemail not set up, so unable to leave a message.  MyChart reply sent to patient and closing encounter.     Mer Arthur RN  [facility]

## 2023-06-27 NOTE — PROGRESS NOTES
Unable to perform Fibrosis scan due to body habitus. TREVOR Aguilar updated.   Detail Level: Generalized Detail Level: Simple Detail Level: Detailed

## 2023-06-28 ENCOUNTER — LAB (OUTPATIENT)
Dept: LAB | Facility: CLINIC | Age: 38
End: 2023-06-28
Payer: COMMERCIAL

## 2023-06-28 ENCOUNTER — VIRTUAL VISIT (OUTPATIENT)
Dept: NEUROSURGERY | Facility: CLINIC | Age: 38
End: 2023-06-28
Attending: NURSE PRACTITIONER
Payer: COMMERCIAL

## 2023-06-28 VITALS — BODY MASS INDEX: 39.15 KG/M2 | WEIGHT: 235 LBS | HEIGHT: 65 IN

## 2023-06-28 DIAGNOSIS — G93.5 CHIARI MALFORMATION TYPE I (H): Primary | ICD-10-CM

## 2023-06-28 DIAGNOSIS — F25.0 SCHIZOAFFECTIVE DISORDER, BIPOLAR TYPE (H): ICD-10-CM

## 2023-06-28 DIAGNOSIS — R51.9 NONINTRACTABLE HEADACHE, UNSPECIFIED CHRONICITY PATTERN, UNSPECIFIED HEADACHE TYPE: ICD-10-CM

## 2023-06-28 LAB
BASOPHILS # BLD AUTO: 0.1 10E3/UL (ref 0–0.2)
BASOPHILS NFR BLD AUTO: 1 %
EOSINOPHIL # BLD AUTO: 0.4 10E3/UL (ref 0–0.7)
EOSINOPHIL NFR BLD AUTO: 2 %
ERYTHROCYTE [DISTWIDTH] IN BLOOD BY AUTOMATED COUNT: 14.4 % (ref 10–15)
HCT VFR BLD AUTO: 36.1 % (ref 35–47)
HGB BLD-MCNC: 11.2 G/DL (ref 11.7–15.7)
IMM GRANULOCYTES # BLD: 0.2 10E3/UL
IMM GRANULOCYTES NFR BLD: 1 %
LYMPHOCYTES # BLD AUTO: 4.4 10E3/UL (ref 0.8–5.3)
LYMPHOCYTES NFR BLD AUTO: 24 %
MCH RBC QN AUTO: 30.4 PG (ref 26.5–33)
MCHC RBC AUTO-ENTMCNC: 31 G/DL (ref 31.5–36.5)
MCV RBC AUTO: 98 FL (ref 78–100)
MONOCYTES # BLD AUTO: 1 10E3/UL (ref 0–1.3)
MONOCYTES NFR BLD AUTO: 6 %
NEUTROPHILS # BLD AUTO: 12.3 10E3/UL (ref 1.6–8.3)
NEUTROPHILS NFR BLD AUTO: 67 %
PLATELET # BLD AUTO: 364 10E3/UL (ref 150–450)
RBC # BLD AUTO: 3.68 10E6/UL (ref 3.8–5.2)
WBC # BLD AUTO: 18.3 10E3/UL (ref 4–11)

## 2023-06-28 PROCEDURE — 85025 COMPLETE CBC W/AUTO DIFF WBC: CPT

## 2023-06-28 PROCEDURE — 36415 COLL VENOUS BLD VENIPUNCTURE: CPT

## 2023-06-28 PROCEDURE — 99203 OFFICE O/P NEW LOW 30 MIN: CPT | Mod: VID | Performed by: NURSE PRACTITIONER

## 2023-06-28 ASSESSMENT — PAIN SCALES - GENERAL: PAINLEVEL: NO PAIN (0)

## 2023-06-28 NOTE — PATIENT INSTRUCTIONS
You met with Pediatric Neurosurgery at the [facility]    ENDER Goodson Dr., Dr., NP      Pediatric Appointment Scheduling and Call Center:   [phone]    Nurse Practitioner  [phone]    Mailing Address  [address] [address]    Street Address   [address] [place] [address]    Fax Number  [phone]    For urgent matters that cannot wait until the next business day, occur over a holiday and/or weekend, report directly to your nearest ER or you may call [phone] and ask to page the Pediatric Neurosurgery Resident on call.

## 2023-06-28 NOTE — LETTER
6/28/2023      RE: Divina Delgadillo  [id number] [facility] [id number]     Dear Colleague,    Thank you for the opportunity to participate in the care of your patient, Divina Delgadillo, at the [facility] at [facility]. Please see a copy of my visit note below.         Video visit start: 904am  Video visit end: 933am    Neurosurgery Clinic    Thank you for referring Divina Delgadillo to the neurosurgery clinic at the [facility].   I had the opportunity to meet with Divina Delgadillo on June 28, 2023.    As you know, Divina is a 38 year old female     She recently got a CT head because she was having migraines and migraine medication wasn't working and she was getting dizzy spells. She notes she started having migraines and dizzy spells approximately 6 months ago, has been taking Topamax, she notes it has not been helping as much. She notes she did not really have headaches prior to 6 months ago  She notes she gets a headache everyday at the front of her forehead and base of her skull  They are a throbbing. Headaches are better with dark rooms, sitting down, not being on her feet, she had to leave work early last week due to a migraine and dizziness. She notes her dizziness and migraines typically come at different times. She wakes up in the morning with a headache and sometimes wakes in the middle of the night with a headache. She denies any worsening pain with valsalva. 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 She denies any falls, traumas or car accidents. She has never seen a neurologist. She notes she gets blurred vision with her dizzy spells. She notes she has lost her balance a few times from her dizzy spells  She notes she has had problems with her ears her whole life, she notes she has followed with ENT for most of her life. She follows with them regularly. She  denies any problems with swallowing. She notes she does snore, her mom states it is very loud, no one has ever told her that she stops breathing in her sleep. She denies any numbness/tingling in arms. She notes numbness/tingling in her legs often, she notes this when she is at work, she notes part of her butt or thigh go numb on both sides. She notes some weakness in legs but not often      Past Medical History:   Diagnosis Date     Acute pain of left knee 03/22/2019     Acute-on-chronic kidney injury (H) 03/22/2019     ARF (acute renal failure) (H) 03/23/2019     Cervical high risk HPV (human papillomavirus) test positive 06/20/2017     Deep venous thrombosis of arm (H) 01/06/2017    ultrasound 1/6/2017-  venous thrombus in the antecubital fossa region and the left forearm as described     Depressive disorder      Depressive disorder, not elsewhere classified      DVT (deep venous thrombosis) (H)      Esophageal reflux     GERD     Mild intermittent asthma      Other specified types of schizophrenia, unspecified condition        Past Surgical History:   Procedure Laterality Date     ADRENALECTOMY Left 2015     BIOPSY       BREAST SURGERY  2013     COLONOSCOPY       ENT SURGERY  10/01/2000    Tympanoplasty     IR LIVER BIOPSY PERCUTANEOUS  11/14/2019     PANCREATECTOMY PARTIAL  2015     PERCUTANEOUS BIOPSY LIVER Right 11/14/2019    Procedure: Percutaneous Liver Biopsy;  Surgeon: Sean Guzman PA-C;  [facility]: [facility]     SPLENECTOMY  2015     TONSILLECTOMY  10/01/2000    Tonsillectomy       ALLERGIES:  Acetaminophen and Latex    Current Outpatient Medications   Medication Sig Dispense Refill     acetaminophen (TYLENOL) 325 MG tablet Take 325-650 mg by mouth 2 tab every 4-6 hours as needed, do not exceed 9 tabs in 24hours       albuterol (VENTOLIN HFA) 108 (90 Base) MCG/ACT inhaler INHALE 2 PUFFS INTO THE LUNGS EVERY SIX  HOURS AS NEEDED FOR SHORTNESS OF BREATH 18 g 10     ARIPiprazole (ABILIFY) 30 MG  tablet Take 30 mg by mouth daily       atorvastatin (LIPITOR) 10 MG tablet Take 1 tablet (10 mg) by mouth daily 90 tablet 3     Biotin 5 MG TABS Take 1 tablet by mouth daily 60 tablet 1     blood glucose (ONETOUCH VERIO IQ) test strip Use to test blood sugar up to 3 times daily 100 strip 11     Blood Glucose Monitoring Suppl (ONETOUCH VERIO FLEX SYSTEM) w/Device KIT 1 each daily 1 kit 0     carvedilol (COREG) 12.5 MG tablet Take 1 tablet (12.5 mg) by mouth 2 times daily 180 tablet 3     cloZAPine (CLOZARIL) 100 MG tablet Take 50 mg by mouth 2 times daily       COMPOUNDED NON-CONTROLLED SUBSTANCE (CMPD RX) - PHARMACY TO MIX COMPOUNDED MEDICATION Chloramphenicol 50 mg, sulfamethoxazole 50 mg, amphotericin B 5 mg, hydrocortisone 1 mg. 2-3 puffs to affected ear PRN itching/drainage 10 capsule 1     Continuous Blood Gluc Sensor (DEXCOM G6 SENSOR) MISC 1 each every 10 days. Change every 10 days. USE TO READ BLOOD SUGARS PER  INSTRUCTIONS 12 each 1     Continuous Blood Gluc Transmit (DEXCOM G6 TRANSMITTER) MISC 1 each every 3 months Change every 3 months. USE TO READ BLOOD SUGARS PER  INSTRUCTIONS 1 each 1     fluocinolone acetonide 0.01 % OIL Place 4 drops in ear(s) 2 times daily as needed (ear itching) 20 mL 3     FLUoxetine (PROZAC) 40 MG capsule Take 40 mg by mouth daily       glucose (BD GLUCOSE) 4 g chewable tablet Take 1 tablet by mouth every hour as needed for low blood sugar 30 tablet 4     hydrOXYzine (ATARAX) 25 MG tablet Take 1 Tablet (25 mg) by mouth 3 times daily if needed for Anxiety.       Insulin Aspart FlexPen 100 UNIT/ML SOPN Inject 1-14 Units Subcutaneous 3 times daily (with meals) 3 times daily (with meals) (Use Novlog on meal size small meal 15-30gm carb: 3 units, average meal 45-60gm carb: 5 units, large meal 60gm+ carb: 7 units + pre meal correction 1:40>150. Max dose per 24 hours is 50 units) 30 mL 1     insulin glargine (LANTUS SOLOSTAR) 100 UNIT/ML pen Inject 34 Units  Subcutaneous every morning 45 mL 1     insulin pen needle (32G X 6 MM) 32G X 6 MM miscellaneous Use 4 pen needles daily. 150 each 10     lamoTRIgine (LAMICTAL) 100 MG tablet Take 100 mg by mouth At Bedtime       lisinopril (ZESTRIL) 5 MG tablet Take 1 tablet (5 mg) by mouth daily 90 tablet 3     loperamide (IMODIUM A-D) 2 MG tablet 2 caplets after loose stool then 1 after each loose stool do not exceed 4 in 24hrs       loratadine (CLARITIN) 10 MG tablet Take 1 tablet (10 mg) by mouth every morning 28 tablet 10     omeprazole (PRILOSEC) 20 MG DR capsule TAKE 1 CAPSULE BY MOUTH EVERY DAY 28 capsule 2     OneTouch Delica Lancets 33G MISC 1 each 3 times daily 100 each 11     QUEtiapine (SEROQUEL) 25 MG tablet Take 25 mg by mouth 1 tablet 4 times daily as needed for agitation or hallucinations       topiramate (TOPAMAX) 50 MG tablet Take 1 tablet (50 mg) by mouth 2 times daily 60 tablet 3     traZODone (DESYREL) 50 MG tablet Take 1-2 Tablets ( mg) by mouth at bedtime if needed for Sleep.       ACCU-CHEK GUIDE test strip Use to test blood sugar 3 times daily or as directed. (Patient not taking: Reported on 2/14/2023) 150 strip 1     insulin pen needle (ULTICARE MINI) 31G X 6 MM miscellaneous USE 1 PEN NEEDLE DAILY (Patient not taking: Reported on 3/22/2023) 100 each 10     psyllium (METAMUCIL/KONSYL) 58.6 % powder Take 6 g (1 teaspoonful) by mouth daily (Patient not taking: Reported on 6/28/2023) 425 g 1     vitamin B6 (PYRIDOXINE) 100 MG tablet Take 1 tablet (100 mg) by mouth daily (Patient not taking: Reported on 6/28/2023) 30 tablet 2       Family History   Problem Relation Age of Onset     Respiratory Mother         ASTHMA     Allergies Mother      Depression Mother      Chronic Obstructive Pulmonary Disease Mother      Anxiety Disorder Mother      Mental Illness Mother      Asthma Mother      Heart Disease Father         HEART VALVE REPLACEMENT     Hypertension Father      Diabetes Father      Depression Father  "     Anxiety Disorder Father      Mental Illness Father      Obesity Father      Respiratory Sister      Allergies Sister      Depression Sister      Respiratory Sister      Allergies Sister      Depression Sister      Respiratory Brother      Allergies Brother      Kidney Disease No family hx of    No family history of brain or skull surgery    Social History     Tobacco Use     Smoking status: Former     Types: Vaping Device, Cigarettes     Quit date: 2023     Years since quittin.3     Passive exposure: Yes     Smokeless tobacco: Current   Substance Use Topics     Alcohol use: No         PHYSICAL EXAM:   Ht 5' 5\" (165.1 cm)   Wt 235 lb (106.6 kg)   BMI 39.11 kg/m    EOMi, appears to move all extremities symmetrically  Exam limited based on the nature of the visit    IMAGES:  Briefly reviewed imaging with patient and her mother  Tonsillar descent but difficult to measure based on imaging studies    ASSESSMENT:  Divina Delgadillo, 38 year old female with headaches and tonsillar descent seen on CT head, neurologically stable    PLAN:  - neurology referral placed for headache management  -full spine MRI ordered, sedated, and follow up with me after imaging studies completed  - Divina Delgadillo and family were counseled to please contact us with any acute worsening of symptoms, or with any questions or concerns.       Francoise Lehman NP  Department of Neurosurgery  [phone]    This patient was discussed with neurosurgery faculty, who agrees with the above.  Francoise Lehman NP on 2023 at 8:56 AM    "

## 2023-06-28 NOTE — PROGRESS NOTES
Video visit start: 904am  Video visit end: 933am    Neurosurgery Clinic    Thank you for referring Divina Delgadillo to the neurosurgery clinic at the [facility].   I had the opportunity to meet with Divina Delgadillo on June 28, 2023.    As you know, Divina is a 38 year old female     She recently got a CT head because she was having migraines and migraine medication wasn't working and she was getting dizzy spells. She notes she started having migraines and dizzy spells approximately 6 months ago, has been taking Topamax, she notes it has not been helping as much. She notes she did not really have headaches prior to 6 months ago  She notes she gets a headache everyday at the front of her forehead and base of her skull  They are a throbbing. Headaches are better with dark rooms, sitting down, not being on her feet, she had to leave work early last week due to a migraine and dizziness. She notes her dizziness and migraines typically come at different times. She wakes up in the morning with a headache and sometimes wakes in the middle of the night with a headache. She denies any worsening pain with valsalva. 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 She denies any falls, traumas or car accidents. She has never seen a neurologist. She notes she gets blurred vision with her dizzy spells. She notes she has lost her balance a few times from her dizzy spells  She notes she has had problems with her ears her whole life, she notes she has followed with ENT for most of her life. She follows with them regularly. She denies any problems with swallowing. She notes she does snore, her mom states it is very loud, no one has ever told her that she stops breathing in her sleep. She denies any numbness/tingling in arms. She notes numbness/tingling in her legs often, she notes this when she is at work, she notes part of her butt or thigh go numb on both sides. She notes some weakness  in legs but not often      Past Medical History:   Diagnosis Date     Acute pain of left knee 03/22/2019     Acute-on-chronic kidney injury (H) 03/22/2019     ARF (acute renal failure) (H) 03/23/2019     Cervical high risk HPV (human papillomavirus) test positive 06/20/2017     Deep venous thrombosis of arm (H) 01/06/2017    ultrasound 1/6/2017-  venous thrombus in the antecubital fossa region and the left forearm as described     Depressive disorder      Depressive disorder, not elsewhere classified      DVT (deep venous thrombosis) (H)      Esophageal reflux     GERD     Mild intermittent asthma      Other specified types of schizophrenia, unspecified condition        Past Surgical History:   Procedure Laterality Date     ADRENALECTOMY Left 2015     BIOPSY       BREAST SURGERY  2013     COLONOSCOPY       ENT SURGERY  10/01/2000    Tympanoplasty     IR LIVER BIOPSY PERCUTANEOUS  11/14/2019     PANCREATECTOMY PARTIAL  2015     PERCUTANEOUS BIOPSY LIVER Right 11/14/2019    Procedure: Percutaneous Liver Biopsy;  Surgeon: Sean Guzman PA-C;  Location: [facility] OR     SPLENECTOMY  2015     TONSILLECTOMY  10/01/2000    Tonsillectomy       ALLERGIES:  Acetaminophen and Latex    Current Outpatient Medications   Medication Sig Dispense Refill     acetaminophen (TYLENOL) 325 MG tablet Take 325-650 mg by mouth 2 tab every 4-6 hours as needed, do not exceed 9 tabs in 24hours       albuterol (VENTOLIN HFA) 108 (90 Base) MCG/ACT inhaler INHALE 2 PUFFS INTO THE LUNGS EVERY SIX  HOURS AS NEEDED FOR SHORTNESS OF BREATH 18 g 10     ARIPiprazole (ABILIFY) 30 MG tablet Take 30 mg by mouth daily       atorvastatin (LIPITOR) 10 MG tablet Take 1 tablet (10 mg) by mouth daily 90 tablet 3     Biotin 5 MG TABS Take 1 tablet by mouth daily 60 tablet 1     blood glucose (ONETOUCH VERIO IQ) test strip Use to test blood sugar up to 3 times daily 100 strip 11     Blood Glucose Monitoring Suppl (ONETOUCH VERIO FLEX SYSTEM) w/Device KIT 1  each daily 1 kit 0     carvedilol (COREG) 12.5 MG tablet Take 1 tablet (12.5 mg) by mouth 2 times daily 180 tablet 3     cloZAPine (CLOZARIL) 100 MG tablet Take 50 mg by mouth 2 times daily       COMPOUNDED NON-CONTROLLED SUBSTANCE (CMPD RX) - PHARMACY TO MIX COMPOUNDED MEDICATION Chloramphenicol 50 mg, sulfamethoxazole 50 mg, amphotericin B 5 mg, hydrocortisone 1 mg. 2-3 puffs to affected ear PRN itching/drainage 10 capsule 1     Continuous Blood Gluc Sensor (DEXCOM G6 SENSOR) MISC 1 each every 10 days. Change every 10 days. USE TO READ BLOOD SUGARS PER  INSTRUCTIONS 12 each 1     Continuous Blood Gluc Transmit (DEXCOM G6 TRANSMITTER) MISC 1 each every 3 months Change every 3 months. USE TO READ BLOOD SUGARS PER  INSTRUCTIONS 1 each 1     fluocinolone acetonide 0.01 % OIL Place 4 drops in ear(s) 2 times daily as needed (ear itching) 20 mL 3     FLUoxetine (PROZAC) 40 MG capsule Take 40 mg by mouth daily       glucose (BD GLUCOSE) 4 g chewable tablet Take 1 tablet by mouth every hour as needed for low blood sugar 30 tablet 4     hydrOXYzine (ATARAX) 25 MG tablet Take 1 Tablet (25 mg) by mouth 3 times daily if needed for Anxiety.       Insulin Aspart FlexPen 100 UNIT/ML SOPN Inject 1-14 Units Subcutaneous 3 times daily (with meals) 3 times daily (with meals) (Use Novlog on meal size small meal 15-30gm carb: 3 units, average meal 45-60gm carb: 5 units, large meal 60gm+ carb: 7 units + pre meal correction 1:40>150. Max dose per 24 hours is 50 units) 30 mL 1     insulin glargine (LANTUS SOLOSTAR) 100 UNIT/ML pen Inject 34 Units Subcutaneous every morning 45 mL 1     insulin pen needle (32G X 6 MM) 32G X 6 MM miscellaneous Use 4 pen needles daily. 150 each 10     lamoTRIgine (LAMICTAL) 100 MG tablet Take 100 mg by mouth At Bedtime       lisinopril (ZESTRIL) 5 MG tablet Take 1 tablet (5 mg) by mouth daily 90 tablet 3     loperamide (IMODIUM A-D) 2 MG tablet 2 caplets after loose stool then 1 after  each loose stool do not exceed 4 in 24hrs       loratadine (CLARITIN) 10 MG tablet Take 1 tablet (10 mg) by mouth every morning 28 tablet 10     omeprazole (PRILOSEC) 20 MG DR capsule TAKE 1 CAPSULE BY MOUTH EVERY DAY 28 capsule 2     OneTouch Delica Lancets 33G MISC 1 each 3 times daily 100 each 11     QUEtiapine (SEROQUEL) 25 MG tablet Take 25 mg by mouth 1 tablet 4 times daily as needed for agitation or hallucinations       topiramate (TOPAMAX) 50 MG tablet Take 1 tablet (50 mg) by mouth 2 times daily 60 tablet 3     traZODone (DESYREL) 50 MG tablet Take 1-2 Tablets ( mg) by mouth at bedtime if needed for Sleep.       ACCU-CHEK GUIDE test strip Use to test blood sugar 3 times daily or as directed. (Patient not taking: Reported on 2/14/2023) 150 strip 1     insulin pen needle (ULTICARE MINI) 31G X 6 MM miscellaneous USE 1 PEN NEEDLE DAILY (Patient not taking: Reported on 3/22/2023) 100 each 10     psyllium (METAMUCIL/KONSYL) 58.6 % powder Take 6 g (1 teaspoonful) by mouth daily (Patient not taking: Reported on 6/28/2023) 425 g 1     vitamin B6 (PYRIDOXINE) 100 MG tablet Take 1 tablet (100 mg) by mouth daily (Patient not taking: Reported on 6/28/2023) 30 tablet 2       Family History   Problem Relation Age of Onset     Respiratory Mother         ASTHMA     Allergies Mother      Depression Mother      Chronic Obstructive Pulmonary Disease Mother      Anxiety Disorder Mother      Mental Illness Mother      Asthma Mother      Heart Disease Father         HEART VALVE REPLACEMENT     Hypertension Father      Diabetes Father      Depression Father      Anxiety Disorder Father      Mental Illness Father      Obesity Father      Respiratory Sister      Allergies Sister      Depression Sister      Respiratory Sister      Allergies Sister      Depression Sister      Respiratory Brother      Allergies Brother      Kidney Disease No family hx of    No family history of brain or skull surgery    Social History     Tobacco  "Use     Smoking status: Former     Types: Vaping Device, Cigarettes     Quit date: 2023     Years since quittin.3     Passive exposure: Yes     Smokeless tobacco: Current   Substance Use Topics     Alcohol use: No         PHYSICAL EXAM:   Ht 5' 5\" (165.1 cm)   Wt 235 lb (106.6 kg)   BMI 39.11 kg/m    EOMi, appears to move all extremities symmetrically  Exam limited based on the nature of the visit    IMAGES:  Briefly reviewed imaging with patient and her mother  Tonsillar descent but difficult to measure based on imaging studies    ASSESSMENT:  Divina HOANG Delgadillo, 38 year old female with headaches and tonsillar descent seen on CT head, neurologically stable    PLAN:  - neurology referral placed for headache management  -full spine MRI ordered, sedated, and follow up with me after imaging studies completed  - Divina Delgadillo and family were counseled to please contact us with any acute worsening of symptoms, or with any questions or concerns.       Francoise Lehman NP  Department of Neurosurgery  [phone]    This patient was discussed with neurosurgery faculty, who agrees with the above.  Francoise Lehman NP on 2023 at 8:56 AM  "

## 2023-06-28 NOTE — NURSING NOTE
Is the patient currently in the state of [place]? YES    Visit mode:VIDEO    If the visit is dropped, the patient can be reconnected by: VIDEO VISIT: Text to cell phone: [phone]    Will anyone else be joining the visit? NO      How would you like to obtain your AVS? MyChart    Are changes needed to the allergy or medication list? NO    Reason for visit: Consult

## 2023-06-29 DIAGNOSIS — E11.42 TYPE 2 DIABETES MELLITUS WITH DIABETIC POLYNEUROPATHY (H): ICD-10-CM

## 2023-06-29 DIAGNOSIS — E78.5 HYPERLIPIDEMIA LDL GOAL <100: ICD-10-CM

## 2023-06-29 RX ORDER — ATORVASTATIN CALCIUM 10 MG/1
10 TABLET, FILM COATED ORAL DAILY
Qty: 90 TABLET | Refills: 2 | Status: SHIPPED | OUTPATIENT
Start: 2023-06-29 | End: 2024-03-28

## 2023-07-05 NOTE — TELEPHONE ENCOUNTER
Continue Regimen: clotrimazole-betamethasone 1 %-0.05 % topical cream Qday: Apply a thin layer to the nails qday\\nDermanail nail conditioner qday to the fingernails and toenails Reason for Call:  Medication or medication refill:    Do you use a [facility]?  Name of the [facility] and phone [facility] for the current request[phone]    Name of the medication requested: CIPRODEX    Other request: LOV:  4/15/19  LAST REFILL:  5/14/19      Can we leave a detailed message on this number? YES    Phone number patient can be reached at: Home number on file [phone] (home)    Best Time: any     Call taken on 6/13/2019 at 8:47 AM by Aniya Aparicio      Render In Strict Bullet Format?: No Samples Given: Jubila Initiate Treatment: Jublia, apply to the affected nails nightly Detail Level: Zone

## 2023-07-10 ENCOUNTER — MYC MEDICAL ADVICE (OUTPATIENT)
Dept: FAMILY MEDICINE | Facility: CLINIC | Age: 38
End: 2023-07-10
Payer: COMMERCIAL

## 2023-07-13 ENCOUNTER — OFFICE VISIT (OUTPATIENT)
Dept: FAMILY MEDICINE | Facility: CLINIC | Age: 38
End: 2023-07-13
Attending: NURSE PRACTITIONER
Payer: COMMERCIAL

## 2023-07-13 ENCOUNTER — MYC MEDICAL ADVICE (OUTPATIENT)
Dept: FAMILY MEDICINE | Facility: CLINIC | Age: 38
End: 2023-07-13

## 2023-07-13 VITALS
TEMPERATURE: 99.2 F | OXYGEN SATURATION: 99 % | SYSTOLIC BLOOD PRESSURE: 130 MMHG | BODY MASS INDEX: 38.11 KG/M2 | DIASTOLIC BLOOD PRESSURE: 50 MMHG | RESPIRATION RATE: 16 BRPM | HEART RATE: 90 BPM | WEIGHT: 229 LBS

## 2023-07-13 DIAGNOSIS — E11.65 UNCONTROLLED TYPE 2 DIABETES MELLITUS WITH HYPERGLYCEMIA (H): ICD-10-CM

## 2023-07-13 DIAGNOSIS — M54.50 CHRONIC BILATERAL LOW BACK PAIN WITHOUT SCIATICA: ICD-10-CM

## 2023-07-13 DIAGNOSIS — M54.2 NECK PAIN: ICD-10-CM

## 2023-07-13 DIAGNOSIS — I10 ESSENTIAL HYPERTENSION: Chronic | ICD-10-CM

## 2023-07-13 DIAGNOSIS — Z79.4 TYPE 2 DIABETES MELLITUS WITH STAGE 3B CHRONIC KIDNEY DISEASE, WITH LONG-TERM CURRENT USE OF INSULIN (H): ICD-10-CM

## 2023-07-13 DIAGNOSIS — E11.22 TYPE 2 DIABETES MELLITUS WITH STAGE 3B CHRONIC KIDNEY DISEASE, WITH LONG-TERM CURRENT USE OF INSULIN (H): ICD-10-CM

## 2023-07-13 DIAGNOSIS — G89.29 CHRONIC BILATERAL LOW BACK PAIN WITHOUT SCIATICA: ICD-10-CM

## 2023-07-13 DIAGNOSIS — N18.31 STAGE 3A CHRONIC KIDNEY DISEASE (H): Chronic | ICD-10-CM

## 2023-07-13 DIAGNOSIS — N18.32 TYPE 2 DIABETES MELLITUS WITH STAGE 3B CHRONIC KIDNEY DISEASE, WITH LONG-TERM CURRENT USE OF INSULIN (H): ICD-10-CM

## 2023-07-13 DIAGNOSIS — L65.9 HAIR LOSS: ICD-10-CM

## 2023-07-13 DIAGNOSIS — Z01.818 PREOP GENERAL PHYSICAL EXAM: Primary | ICD-10-CM

## 2023-07-13 DIAGNOSIS — Z86.2 H/O LEUKOCYTOSIS: Chronic | ICD-10-CM

## 2023-07-13 LAB
ANION GAP SERPL CALCULATED.3IONS-SCNC: 13 MMOL/L (ref 7–15)
BUN SERPL-MCNC: 34.5 MG/DL (ref 6–20)
CALCIUM SERPL-MCNC: 9.7 MG/DL (ref 8.6–10)
CHLORIDE SERPL-SCNC: 112 MMOL/L (ref 98–107)
CREAT SERPL-MCNC: 1.7 MG/DL (ref 0.51–0.95)
DEPRECATED HCO3 PLAS-SCNC: 19 MMOL/L (ref 22–29)
ERYTHROCYTE [DISTWIDTH] IN BLOOD BY AUTOMATED COUNT: 14.3 % (ref 10–15)
GFR SERPL CREATININE-BSD FRML MDRD: 39 ML/MIN/1.73M2
GLUCOSE SERPL-MCNC: 79 MG/DL (ref 70–99)
HBA1C MFR BLD: 8.3 % (ref 0–5.6)
HCT VFR BLD AUTO: 37.2 % (ref 35–47)
HGB BLD-MCNC: 11.4 G/DL (ref 11.7–15.7)
MCH RBC QN AUTO: 29.9 PG (ref 26.5–33)
MCHC RBC AUTO-ENTMCNC: 30.6 G/DL (ref 31.5–36.5)
MCV RBC AUTO: 98 FL (ref 78–100)
PLATELET # BLD AUTO: 394 10E3/UL (ref 150–450)
POTASSIUM SERPL-SCNC: 4.5 MMOL/L (ref 3.4–5.3)
RBC # BLD AUTO: 3.81 10E6/UL (ref 3.8–5.2)
SODIUM SERPL-SCNC: 144 MMOL/L (ref 136–145)
WBC # BLD AUTO: 16.4 10E3/UL (ref 4–11)

## 2023-07-13 PROCEDURE — 36415 COLL VENOUS BLD VENIPUNCTURE: CPT | Performed by: NURSE PRACTITIONER

## 2023-07-13 PROCEDURE — 83036 HEMOGLOBIN GLYCOSYLATED A1C: CPT | Performed by: NURSE PRACTITIONER

## 2023-07-13 PROCEDURE — G0010 ADMIN HEPATITIS B VACCINE: HCPCS | Performed by: NURSE PRACTITIONER

## 2023-07-13 PROCEDURE — 90746 HEPB VACCINE 3 DOSE ADULT IM: CPT | Performed by: NURSE PRACTITIONER

## 2023-07-13 PROCEDURE — 80048 BASIC METABOLIC PNL TOTAL CA: CPT | Performed by: NURSE PRACTITIONER

## 2023-07-13 PROCEDURE — 99214 OFFICE O/P EST MOD 30 MIN: CPT | Mod: 25 | Performed by: NURSE PRACTITIONER

## 2023-07-13 PROCEDURE — 85027 COMPLETE CBC AUTOMATED: CPT | Performed by: NURSE PRACTITIONER

## 2023-07-13 RX ORDER — BIOTIN 5 MG
1 TABLET ORAL DAILY
Qty: 30 TABLET | Refills: 5 | Status: SHIPPED | OUTPATIENT
Start: 2023-07-13 | End: 2024-01-08

## 2023-07-13 ASSESSMENT — ASTHMA QUESTIONNAIRES: ACT_TOTALSCORE: 25

## 2023-07-13 ASSESSMENT — PAIN SCALES - GENERAL: PAINLEVEL: NO PAIN (0)

## 2023-07-13 NOTE — PROGRESS NOTES
[facility]  [address] [address]  Phone: [phone]  Primary Provider: Franky Velasquez  Pre-op Performing Provider: FRANKY VELASQUEZ      PREOPERATIVE EVALUATION:  Today's date: 7/13/2023    Divina Delgadillo is a 38 year old female who presents for a preoperative evaluation.      6/1/2023     3:19 PM   Additional Questions   Roomed by SMA Tatyana   Accompanied by Mother     Surgical Information:  Surgery/Procedure: MRI   Surgery Location: [facility]   Surgeon:   Surgery Date: 8/4/23   Time of Surgery: 6:45 AM   Where patient plans to recover: At home alone  Fax number for surgical facility: Note does not need to be faxed, will be available electronically in Epic.    Assessment & Plan     The proposed surgical procedure is considered LOW risk.    Preop general physical exam  - Basic metabolic panel  (Ca, Cl, CO2, Creat, Gluc, K, Na, BUN); Future  - Basic metabolic panel  (Ca, Cl, CO2, Creat, Gluc, K, Na, BUN)    Chronic bilateral low back pain without sciatica  -patient is cleared for MRI under general anesthesia     Neck pain  -patient is claustrophobic and scheduled for MRI under general anesthesia     Uncontrolled type 2 diabetes mellitus with hyperglycemia (H)  -improved  -patient is scheduled for MRI at 6.30 AM, she is typically takes her medications including insulin after 8 AM, she will take all her medications later in the morning, or after MRI   - PRIMARY CARE FOLLOW-UP SCHEDULING  - Hemoglobin A1c; Future  - Hemoglobin A1c    Hair loss  -improved   - continue Biotin 5 MG TABS; Take 1 tablet by mouth daily    Chronic leukocytosis  -due to asplenia   -CBC stable, no changes   - CBC with platelets; Future  - CBC with platelets    Essential hypertension  -well controlled   - Basic metabolic panel  (Ca, Cl, CO2, Creat, Gluc, K, Na, BUN)    Stage 3a chronic kidney disease (H)  -stable   - CBC with platelets; Future  - Basic metabolic panel   (Ca, Cl, CO2, Creat, Gluc, K, Na, BUN)            - No identified additional risk factors other than previously addressed    Antiplatelet or Anticoagulation Medication Instructions:   - Patient is on no antiplatelet or anticoagulation medications.    Additional Medication Instructions:  Patient will hold Lantus and Novolog before MRI and will take all her medications later in the morning on the day of her MRI     RECOMMENDATION:  APPROVAL GIVEN to proceed with proposed procedure, without further diagnostic evaluation.        Subjective       HPI related to upcoming procedure: chronic neck and back pain, scheduled for MRI under general anesthesia         7/13/2023     1:42 PM   Preop Questions   1. Have you ever had a heart attack or stroke? No   2. Have you ever had surgery on your heart or blood vessels, such as a stent placement, a coronary artery bypass, or surgery on an artery in your head, neck, heart, or legs? No   3. Do you have chest pain with activity? No   4. Do you have a history of  heart failure? No   5. Do you currently have a cold, bronchitis or symptoms of other infection? No   6. Do you have a cough, shortness of breath, or wheezing? No   7. Do you or anyone in your family have previous history of blood clots? No   8. Do you or does anyone in your family have a serious bleeding problem such as prolonged bleeding following surgeries or cuts? UNKNOWN    9. Have you ever had problems with anemia or been told to take iron pills? No   10. Have you had any abnormal blood loss such as black, tarry or bloody stools, or abnormal vaginal bleeding? No   11. Have you ever had a blood transfusion? No   12. Are you willing to have a blood transfusion if it is medically needed before, during, or after your surgery? Yes   13. Have you or any of your relatives ever had problems with anesthesia? No   14. Do you have sleep apnea, excessive snoring or daytime drowsiness? No   15. Do you have any artifical heart valves  or other implanted medical devices like a pacemaker, defibrillator, or continuous glucose monitor? No   16. Do you have artificial joints? No   17. Are you allergic to latex? YES   18. Is there any chance that you may be pregnant? No       Health Care Directive:  Patient has a Health Care Directive on file      Preoperative Review of :   reviewed - no record of controlled substances prescribed.      Status of Chronic Conditions:  See problem list for active medical problems.  Problems all longstanding and stable, except as noted/documented.  See ROS for pertinent symptoms related to these conditions.    Review of Systems  Constitutional, neuro, ENT, endocrine, pulmonary, cardiac, gastrointestinal, genitourinary, musculoskeletal, integument and psychiatric systems are negative, except as otherwise noted.    Patient Active Problem List    Diagnosis Date Noted    Chiari malformation type I (H) 06/12/2023     Priority: Medium    Ulnar neuropathy of both upper extremities 09/20/2022     Priority: Medium    Insomnia, unspecified type 08/30/2021     Priority: Medium    History of DVT of arm  (deep vein thrombosis) 01/23/2021     Priority: Medium     ultrasound 1/6/2017-  venous thrombus in the antecubital fossa region and the left forearm as described      Schizoaffective disorder, depressive type (H) 07/19/2019     Priority: Medium    Acquired asplenia 03/22/2019     Priority: Medium    Chronic leukocytosis 11/27/2018     Priority: Medium     Due to spleen removal      Nicotine abuse 08/13/2018     Priority: Medium    Uncontrolled type 2 diabetes mellitus with diabetic polyneuropathy, with long-term current use of insulin 01/24/2018     Priority: Medium    Mild intermittent asthma with acute exacerbation 01/16/2018     Priority: Medium    Morbid obesity (H) 01/09/2018     Priority: Medium    IUD (intrauterine device) in place 07/26/2017     Priority: Medium     Mirena IUD inserted in office with premed 7/26/2017      "   Cervical high risk HPV (human papillomavirus) test positive 06/20/2017     Priority: Medium     6/15/2017 Pap:NIL, +HR HPV \"other\" (not 16/18). Plan to repeat Pap+HPV in 1 year  6/14/2018 Pap: NIL/neg HPV. Plan Pap+HPV in 3 years (2021)  3/26/21 NIL pap, Neg HPV. Plan cotest in 3 years.       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, splenectomy, left adrenalectomy 02/26/2015;      Type 2 diabetes mellitus with stage 3 chronic kidney disease, with long-term current use of insulin (H) 02/27/2017     Priority: Medium    Bipolar disorder (H) 02/27/2017     Priority: Medium    Orthostatic hypotension 01/10/2017     Priority: Medium    Tobacco abuse 01/10/2017     Priority: Medium    Long term (current) use of anticoagulants 01/09/2017     Priority: Medium    Stage 3 chronic kidney disease 12/03/2015     Priority: Medium     Overview:   Updated per 10/1/17 IMO import      Vitamin D insufficiency 06/01/2015     Priority: Medium    Cardiac murmur 08/20/2013     Priority: Medium    Depressive disorder 09/15/2012     Priority: Medium    Hyperlipidemia LDL goal <100 10/31/2010     Priority: Medium    Borderline personality disorder (H) 11/06/2009     Priority: Medium    Essential hypertension 06/13/2008     Priority: Medium    NAFL (nonalcoholic fatty liver) 04/11/2006     Priority: Medium     See flowsheet for hepatic panel.   Stopped zocor, was on godon as well had right upper quandrant ultrasound and ct  ? Psych med related vs SAHNI      Esophageal reflux 04/14/2005     Priority: Medium     GERD      PTSD (post-traumatic stress disorder) 01/01/2001     Priority: Medium      Past Medical History:   Diagnosis Date    Acute pain of left knee 03/22/2019    Acute-on-chronic kidney injury (H) 03/22/2019    ARF (acute renal failure) (H) 03/23/2019    Cervical high risk HPV (human papillomavirus) test positive 06/20/2017 "    Deep venous thrombosis of arm (H) 01/06/2017    ultrasound 1/6/2017-  venous thrombus in the antecubital fossa region and the left forearm as described    Depressive disorder     Depressive disorder, not elsewhere classified     DVT (deep venous thrombosis) (H)     Esophageal reflux     GERD    Mild intermittent asthma     Other specified types of schizophrenia, unspecified condition      Past Surgical History:   Procedure Laterality Date    ADRENALECTOMY Left 2015    BIOPSY      BREAST SURGERY  2013    COLONOSCOPY      ENT SURGERY  10/01/2000    Tympanoplasty    IR LIVER BIOPSY PERCUTANEOUS  11/14/2019    PANCREATECTOMY PARTIAL  2015    PERCUTANEOUS BIOPSY LIVER Right 11/14/2019    Procedure: Percutaneous Liver Biopsy;  Surgeon: Sean Guzman PA-C;  Location: [facility] OR    SPLENECTOMY  2015    TONSILLECTOMY  10/01/2000    Tonsillectomy     Current Outpatient Medications   Medication Sig Dispense Refill    ACCU-CHEK GUIDE test strip Use to test blood sugar 3 times daily or as directed. 150 strip 1    acetaminophen (TYLENOL) 325 MG tablet Take 325-650 mg by mouth 2 tab every 4-6 hours as needed, do not exceed 9 tabs in 24hours      albuterol (VENTOLIN HFA) 108 (90 Base) MCG/ACT inhaler INHALE 2 PUFFS INTO THE LUNGS EVERY SIX  HOURS AS NEEDED FOR SHORTNESS OF BREATH 18 g 10    ARIPiprazole (ABILIFY) 30 MG tablet Take 30 mg by mouth daily      atorvastatin (LIPITOR) 10 MG tablet Take 1 tablet (10 mg) by mouth daily 90 tablet 2    Biotin 5 MG TABS Take 1 tablet by mouth daily 30 tablet 5    blood glucose (ONETOUCH VERIO IQ) test strip Use to test blood sugar up to 3 times daily 100 strip 11    Blood Glucose Monitoring Suppl (ONETOUCH VERIO FLEX SYSTEM) w/Device KIT 1 each daily 1 kit 0    carvedilol (COREG) 12.5 MG tablet Take 1 tablet (12.5 mg) by mouth 2 times daily 180 tablet 3    cloZAPine (CLOZARIL) 100 MG tablet Take 50 mg by mouth 2 times daily      Continuous Blood Gluc Sensor (DEXCOM G6 SENSOR) MISC  1 each every 10 days. Change every 10 days. USE TO READ BLOOD SUGARS PER  INSTRUCTIONS 12 each 1    Continuous Blood Gluc Transmit (DEXCOM G6 TRANSMITTER) MISC 1 each every 3 months Change every 3 months. USE TO READ BLOOD SUGARS PER  INSTRUCTIONS 1 each 1    FLUoxetine (PROZAC) 40 MG capsule Take 40 mg by mouth daily      glucose (BD GLUCOSE) 4 g chewable tablet Take 1 tablet by mouth every hour as needed for low blood sugar 30 tablet 4    hydrOXYzine (ATARAX) 25 MG tablet Take 1 Tablet (25 mg) by mouth 3 times daily if needed for Anxiety.      Insulin Aspart FlexPen 100 UNIT/ML SOPN Inject 1-14 Units Subcutaneous 3 times daily (with meals) 3 times daily (with meals) (Use Novlog on meal size small meal 15-30gm carb: 3 units, average meal 45-60gm carb: 5 units, large meal 60gm+ carb: 7 units + pre meal correction 1:40>150. Max dose per 24 hours is 50 units) 30 mL 1    insulin glargine (LANTUS SOLOSTAR) 100 UNIT/ML pen Inject 34 Units Subcutaneous every morning 45 mL 1    insulin pen needle (32G X 6 MM) 32G X 6 MM miscellaneous Use 4 pen needles daily. 150 each 10    insulin pen needle (ULTICARE MINI) 31G X 6 MM miscellaneous USE 1 PEN NEEDLE DAILY 100 each 10    lamoTRIgine (LAMICTAL) 100 MG tablet Take 100 mg by mouth At Bedtime      lisinopril (ZESTRIL) 5 MG tablet Take 1 tablet (5 mg) by mouth daily 90 tablet 3    loperamide (IMODIUM A-D) 2 MG tablet 2 caplets after loose stool then 1 after each loose stool do not exceed 4 in 24hrs      loratadine (CLARITIN) 10 MG tablet Take 1 tablet (10 mg) by mouth every morning 28 tablet 10    omeprazole (PRILOSEC) 20 MG DR capsule TAKE 1 CAPSULE BY MOUTH EVERY DAY 28 capsule 2    OneTouch Delica Lancets 33G MISC 1 each 3 times daily 100 each 11    psyllium (METAMUCIL/KONSYL) 58.6 % powder Take 6 g (1 teaspoonful) by mouth daily 425 g 1    QUEtiapine (SEROQUEL) 25 MG tablet Take 25 mg by mouth 1 tablet 4 times daily as needed for agitation or  hallucinations      topiramate (TOPAMAX) 50 MG tablet Take 1 tablet (50 mg) by mouth 2 times daily 60 tablet 3    traZODone (DESYREL) 50 MG tablet Take 1-2 Tablets ( mg) by mouth at bedtime if needed for Sleep.      COMPOUNDED NON-CONTROLLED SUBSTANCE (CMPD RX) - PHARMACY TO MIX COMPOUNDED MEDICATION Chloramphenicol 50 mg, sulfamethoxazole 50 mg, amphotericin B 5 mg, hydrocortisone 1 mg. 2-3 puffs to affected ear PRN itching/drainage (Patient not taking: Reported on 2023) 10 capsule 1    fluocinolone acetonide 0.01 % OIL Place 4 drops in ear(s) 2 times daily as needed (ear itching) (Patient not taking: Reported on 2023) 20 mL 3    vitamin B6 (PYRIDOXINE) 100 MG tablet Take 1 tablet (100 mg) by mouth daily (Patient not taking: Reported on 2023) 30 tablet 2       Allergies   Allergen Reactions    Acetaminophen Other (See Comments)     pt previously tried to overdose on it, PMD does not want pt taking per pt.     Latex Rash        Social History     Tobacco Use    Smoking status: Former     Types: Vaping Device, Cigarettes     Quit date: 2023     Years since quittin.4     Passive exposure: Yes    Smokeless tobacco: Current   Substance Use Topics    Alcohol use: No       History   Drug Use No         Objective     /50 (BP Location: Left arm, Patient Position: Sitting, Cuff Size: Adult Large)   Pulse 90   Temp 99.2  F (37.3  C) (Tympanic)   Resp 16   Wt 103.9 kg (229 lb)   SpO2 99%   BMI 38.11 kg/m      Physical Exam    GENERAL APPEARANCE: healthy, alert and no distress     EYES: EOMI, PERRL     HENT: ear canals and TM's normal and nose and mouth without ulcers or lesions     NECK: no adenopathy, no asymmetry, masses, or scars and thyroid normal to palpation     RESP: lungs clear to auscultation - no rales, rhonchi or wheezes     CV: regular rates and rhythm, grade 3/6 systolic murmur, unchanged and mild pitting B/L LE edema to LE     MS: extremities normal- no gross deformities  noted, no evidence of inflammation in joints, FROM in all extremities.     SKIN: no suspicious lesions or rashes     NEURO: Normal strength and tone, sensory exam grossly normal, mentation intact and speech normal     PSYCH: mentation appears normal. and affect normal/bright     LYMPHATICS: No cervical adenopathy    Recent Labs   Lab Test 06/28/23  1449 06/01/23  1605 05/26/23  1344 05/23/23  0304 04/04/23  1119 03/22/23  0909 10/26/22  1345 10/19/22  0739   HGB 11.2*  --  11.6* 10.7*   < >  --    < > 11.4*     --  357 328   < >  --    < > 375   INR  --   --   --   --   --   --   --  0.87   NA  --  144  --  140  --  141   < > 136   POTASSIUM  --  4.9  --  4.5  --  5.2   < > 5.9*   CR  --  1.64*  --  1.33*  --  1.65*   < > 1.37*   A1C  --  9.2*  --   --   --  9.1*   < > 9.6*    < > = values in this interval not displayed.        Diagnostics:  Recent Results (from the past 24 hour(s))   Hemoglobin A1c    Collection Time: 07/13/23  2:19 PM   Result Value Ref Range    Hemoglobin A1C 8.3 (H) 0.0 - 5.6 %   Basic metabolic panel  (Ca, Cl, CO2, Creat, Gluc, K, Na, BUN)    Collection Time: 07/13/23  2:19 PM   Result Value Ref Range    Sodium 144 136 - 145 mmol/L    Potassium 4.5 3.4 - 5.3 mmol/L    Chloride 112 (H) 98 - 107 mmol/L    Carbon Dioxide (CO2) 19 (L) 22 - 29 mmol/L    Anion Gap 13 7 - 15 mmol/L    Urea Nitrogen 34.5 (H) 6.0 - 20.0 mg/dL    Creatinine 1.70 (H) 0.51 - 0.95 mg/dL    Calcium 9.7 8.6 - 10.0 mg/dL    Glucose 79 70 - 99 mg/dL    GFR Estimate 39 (L) >60 mL/min/1.73m2   CBC with platelets    Collection Time: 07/13/23  2:19 PM   Result Value Ref Range    WBC Count 16.4 (H) 4.0 - 11.0 10e3/uL    RBC Count 3.81 3.80 - 5.20 10e6/uL    Hemoglobin 11.4 (L) 11.7 - 15.7 g/dL    Hematocrit 37.2 35.0 - 47.0 %    MCV 98 78 - 100 fL    MCH 29.9 26.5 - 33.0 pg    MCHC 30.6 (L) 31.5 - 36.5 g/dL    RDW 14.3 10.0 - 15.0 %    Platelet Count 394 150 - 450 10e3/uL      No EKG required, no history of coronary heart  disease, significant arrhythmia, peripheral arterial disease or other structural heart disease.    Revised Cardiac Risk Index (RCRI):  The patient has the following serious cardiovascular risks for perioperative complications:   - No serious cardiac risks = 0 points     RCRI Interpretation: 0 points: Class I (very low risk - 0.4% complication rate)         Signed Electronically by: VERONIQUE Spears CNP  Copy of this evaluation report is provided to requesting physician.

## 2023-07-13 NOTE — LETTER
July 13, 2023      Divina Delgadillo  [address] [address]        To Whom It May Concern:    Divina Delgadillo was seen in our clinic. She may return to work tomorrow without restrictions.       Sincerely,        VERONIQUE Spears CNP

## 2023-07-14 RX ORDER — FLURBIPROFEN SODIUM 0.3 MG/ML
SOLUTION/ DROPS OPHTHALMIC
Qty: 200 EACH | Refills: 10 | Status: SHIPPED | OUTPATIENT
Start: 2023-07-14 | End: 2023-12-05

## 2023-07-14 RX ORDER — LANCETS 33 GAUGE
1 EACH MISCELLANEOUS 4 TIMES DAILY
Qty: 100 EACH | Refills: 11 | Status: SHIPPED | OUTPATIENT
Start: 2023-07-14 | End: 2024-02-05

## 2023-07-19 ENCOUNTER — VIRTUAL VISIT (OUTPATIENT)
Dept: FAMILY MEDICINE | Facility: CLINIC | Age: 38
End: 2023-07-19
Payer: COMMERCIAL

## 2023-07-19 DIAGNOSIS — K52.9 GASTROENTERITIS: Primary | ICD-10-CM

## 2023-07-19 PROCEDURE — 99213 OFFICE O/P EST LOW 20 MIN: CPT | Mod: VID | Performed by: FAMILY MEDICINE

## 2023-07-19 ASSESSMENT — ENCOUNTER SYMPTOMS
CARDIOVASCULAR NEGATIVE: 1
NAUSEA: 1
CONSTITUTIONAL NEGATIVE: 1
PSYCHIATRIC NEGATIVE: 1
EYES NEGATIVE: 1
VOMITING: 1
RESPIRATORY NEGATIVE: 1
ALLERGIC/IMMUNOLOGIC NEGATIVE: 1
DIARRHEA: 1
ENDOCRINE NEGATIVE: 1
HEMATOLOGIC/LYMPHATIC NEGATIVE: 1
NEUROLOGICAL NEGATIVE: 1

## 2023-07-19 NOTE — LETTER
July 19, 2023      Divina Delgadillo  [address] [address]        To Whom It May Concern:    Divina Delgadillo  was seen on 07/19/2023.  She has been sick with diarrhea,nause and vomiting for the last three days.    Please excuse her until 07/24/2023 due to illness.        Sincerely,        Portia Reese MD

## 2023-07-19 NOTE — PROGRESS NOTES
Divina is a 38 year old who is being evaluated via a billable video visit.      How would you like to obtain your AVS? MyChart  If the video visit is dropped, the invitation should be resent by: Cell phone to cell phone.  [phone].  Will anyone else be joining your video visit? No          Assessment & Plan     (K52.9) Gastroenteritis  (primary encounter diagnosis)  Comment: Patient reports nausea,vomiting and diarrhea ongoing for a few days. She has missed work and needs a note  Plan: recommend increase in fluids , note written for the patient.         FUTURE APPOINTMENTS:       - Follow-up visit as needed.    Portia Resee MD  [facility]    Rhiannon Murcia is a 38 year old, presenting for the following health issues:  Letter Request (Needing a letter to return to work.  She can do the letter through her My Chart or  if needed.)      7/19/2023     1:25 PM   Additional Questions   Roomed by Page Wild CMA     History of Present Illness       Reason for visit:  Letter for going back to work-has been sick.    She eats 2-3 servings of fruits and vegetables daily.She consumes 1 sweetened beverage(s) daily.She exercises with enough effort to increase her heart rate 60 or more minutes per day.  She exercises with enough effort to increase her heart rate 4 days per week.   She is taking medications regularly.       Acute Illness  Acute illness concerns: Diarrhea/vomiting.  Onset/Duration: Started early on Monday morning, 2:00 am.  Symptoms:  Fever: Did have for two days.  Today it was normal.  Chills/Sweats: This is better today.  Headache (location?): Yes, also just found out she has a brain tumor that is causing the headaches.  She has a MRI scheduled.  Sinus Pressure: No  Conjunctivitis:  No  Ear Pain: no  Rhinorrhea: No  Congestion: No  Sore Throat: No  Cough: no  Wheeze: No  Decreased Appetite: Yes, has not eaten much in 3 days.  Has been drinking a lot of liquids.   Doesn't feel hungry.  Nausea: YES- nothing sounds good yet to eat.  Vomiting: YES- Yesterday at 10:30 am was the last time for vomiting.  Diarrhea: YES- Last night was the last episode at 9:30 pm.  Dysuria/Freq.: No  Dysuria or Hematuria: No  Fatigue/Achiness: Body aches are better.  Still feeling worn down.  Sick/Strep Exposure: YES- was babysitting her best friends daughter who was ill with the same virus.        Review of Systems   Constitutional: Negative.    HENT: Negative.     Eyes: Negative.    Respiratory: Negative.     Cardiovascular: Negative.    Gastrointestinal:  Positive for diarrhea, nausea and vomiting.   Endocrine: Negative.    Breasts:  negative.    Genitourinary: Negative.    Allergic/Immunologic: Negative.    Neurological: Negative.    Hematological: Negative.    Psychiatric/Behavioral: Negative.              Objective    Vitals - Patient Reported  Temperature (Patient Reported): 98.4  F (36.9  C)  Pain Score: No Pain (0)        Physical Exam   GENERAL: Healthy, alert and no distress  EYES: Eyes grossly normal to inspection.  No discharge or erythema, or obvious scleral/conjunctival abnormalities.  RESP: No audible wheeze, cough, or visible cyanosis.  No visible retractions or increased work of breathing.    SKIN: Visible skin clear. No significant rash, abnormal pigmentation or lesions.  NEURO: Cranial nerves grossly intact.  Mentation and speech appropriate for age.  PSYCH: Mentation appears normal, affect normal/bright, judgement and insight intact, normal speech and appearance well-groomed.            Video-Visit Details    Type of service:  Video Visit   Video Start Time: 2:06 PM  Video End Time:2:08 PM    Originating Location (pt. Location): Home    Distant Location (provider location):  On-site  Platform used for Video Visit: Naure[employer]

## 2023-07-23 ENCOUNTER — APPOINTMENT (OUTPATIENT)
Dept: GENERAL RADIOLOGY | Facility: CLINIC | Age: 38
End: 2023-07-23
Attending: STUDENT IN AN ORGANIZED HEALTH CARE EDUCATION/TRAINING PROGRAM
Payer: COMMERCIAL

## 2023-07-23 ENCOUNTER — HOSPITAL ENCOUNTER (EMERGENCY)
Facility: CLINIC | Age: 38
Discharge: HOME | End: 2023-07-23
Attending: STUDENT IN AN ORGANIZED HEALTH CARE EDUCATION/TRAINING PROGRAM | Admitting: STUDENT IN AN ORGANIZED HEALTH CARE EDUCATION/TRAINING PROGRAM
Payer: COMMERCIAL

## 2023-07-23 VITALS
TEMPERATURE: 98.2 F | WEIGHT: 225 LBS | OXYGEN SATURATION: 100 % | BODY MASS INDEX: 38.41 KG/M2 | SYSTOLIC BLOOD PRESSURE: 148 MMHG | RESPIRATION RATE: 18 BRPM | HEIGHT: 64 IN | DIASTOLIC BLOOD PRESSURE: 52 MMHG | HEART RATE: 87 BPM

## 2023-07-23 DIAGNOSIS — S89.91XA KNEE INJURY, RIGHT, INITIAL ENCOUNTER: ICD-10-CM

## 2023-07-23 DIAGNOSIS — S99.911A ANKLE INJURY, RIGHT, INITIAL ENCOUNTER: ICD-10-CM

## 2023-07-23 PROCEDURE — 99283 EMERGENCY DEPT VISIT LOW MDM: CPT | Performed by: STUDENT IN AN ORGANIZED HEALTH CARE EDUCATION/TRAINING PROGRAM

## 2023-07-23 PROCEDURE — 73610 X-RAY EXAM OF ANKLE: CPT | Mod: RT

## 2023-07-23 PROCEDURE — 73562 X-RAY EXAM OF KNEE 3: CPT | Mod: RT

## 2023-07-23 PROCEDURE — 250N000013 HC RX MED GY IP 250 OP 250 PS 637: Performed by: STUDENT IN AN ORGANIZED HEALTH CARE EDUCATION/TRAINING PROGRAM

## 2023-07-23 RX ORDER — ACETAMINOPHEN 500 MG
1000 TABLET ORAL ONCE
Status: COMPLETED | OUTPATIENT
Start: 2023-07-23 | End: 2023-07-23

## 2023-07-23 RX ADMIN — ACETAMINOPHEN 1000 MG: 500 TABLET, FILM COATED ORAL at 18:59

## 2023-07-23 ASSESSMENT — ACTIVITIES OF DAILY LIVING (ADL): ADLS_ACUITY_SCORE: 35

## 2023-07-23 NOTE — LETTER
July 23, 2023      To Whom It May Concern:      Divina BOLTON Mariannevirgil was seen in our Emergency Department today, 07/23/23.  I expect her condition to improve over the next few days, however she should refrain from standing and weightbearing until improved.      Sincerely,        Mychal Bo, DO

## 2023-07-23 NOTE — ED PROVIDER NOTES
History     Chief Complaint   Patient presents with    Ankle Pain     HPI  Divina Delgadillo is a 38 year old female who presents department for evaluation of right knee and ankle pain after unwitnessed fall prior to arrival.  Patient explains that she gets dizzy sometimes and today at home got dizzy while standing in the kitchen, fell from standing height causing injury to the right knee and ankle.  She denies striking head or suffering loss of consciousness, although was unable to get up afterwards secondary to pain.  Patient called EMS for transport.  No other newly appreciated injuries.      Allergies:  Allergies   Allergen Reactions    Acetaminophen Other (See Comments)     pt previously tried to overdose on it, PMD does not want pt taking per pt.     Latex Rash       Problem List:    Patient Active Problem List    Diagnosis Date Noted    Chiari malformation type I (H) 06/12/2023     Priority: Medium    Ulnar neuropathy of both upper extremities 09/20/2022     Priority: Medium    Insomnia, unspecified type 08/30/2021     Priority: Medium    History of DVT of arm  (deep vein thrombosis) 01/23/2021     Priority: Medium     ultrasound 1/6/2017-  venous thrombus in the antecubital fossa region and the left forearm as described      Schizoaffective disorder, depressive type (H) 07/19/2019     Priority: Medium    Acquired asplenia 03/22/2019     Priority: Medium    Chronic leukocytosis 11/27/2018     Priority: Medium     Due to spleen removal      Nicotine abuse 08/13/2018     Priority: Medium    Uncontrolled type 2 diabetes mellitus with diabetic polyneuropathy, with long-term current use of insulin 01/24/2018     Priority: Medium    Mild intermittent asthma with acute exacerbation 01/16/2018     Priority: Medium    Morbid obesity (H) 01/09/2018     Priority: Medium    IUD (intrauterine device) in place 07/26/2017     Priority: Medium     Mirena IUD inserted in office with premed 7/26/2017        Cervical high  "risk HPV (human papillomavirus) test positive 06/20/2017     Priority: Medium     6/15/2017 Pap:NIL, +HR HPV \"other\" (not 16/18). Plan to repeat Pap+HPV in 1 year  6/14/2018 Pap: NIL/neg HPV. Plan Pap+HPV in 3 years (2021)  3/26/21 NIL pap, Neg HPV. Plan cotest in 3 years.       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, splenectomy, left adrenalectomy 02/26/2015;      Type 2 diabetes mellitus with stage 3 chronic kidney disease, with long-term current use of insulin (H) 02/27/2017     Priority: Medium    Bipolar disorder (H) 02/27/2017     Priority: Medium    Orthostatic hypotension 01/10/2017     Priority: Medium    Tobacco abuse 01/10/2017     Priority: Medium    Long term (current) use of anticoagulants 01/09/2017     Priority: Medium    Stage 3 chronic kidney disease 12/03/2015     Priority: Medium     Overview:   Updated per 10/1/17 IMO import      Vitamin D insufficiency 06/01/2015     Priority: Medium    Cardiac murmur 08/20/2013     Priority: Medium    Depressive disorder 09/15/2012     Priority: Medium    Hyperlipidemia LDL goal <100 10/31/2010     Priority: Medium    Borderline personality disorder (H) 11/06/2009     Priority: Medium    Essential hypertension 06/13/2008     Priority: Medium    NAFL (nonalcoholic fatty liver) 04/11/2006     Priority: Medium     See flowsheet for hepatic panel.   Stopped zocor, was on godon as well had right upper quandrant ultrasound and ct  ? Psych med related vs [facility]NI      Esophageal reflux 04/14/2005     Priority: Medium     GERD      PTSD (post-traumatic stress disorder) 01/01/2001     Priority: Medium        Past Medical History:    Past Medical History:   Diagnosis Date    Acute pain of left knee 03/22/2019    Acute-on-chronic kidney injury (H) 03/22/2019    ARF (acute renal failure) (H) 03/23/2019    Cervical high risk HPV (human papillomavirus) test positive " 2017    Deep venous thrombosis of arm (H) 2017    Depressive disorder     Depressive disorder, not elsewhere classified     DVT (deep venous thrombosis) (H)     Esophageal reflux     Mild intermittent asthma     Other specified types of schizophrenia, unspecified condition        Past Surgical History:    Past Surgical History:   Procedure Laterality Date    ADRENALECTOMY Left 2015    BIOPSY      BREAST SURGERY  2013    COLONOSCOPY      ENT SURGERY  10/01/2000    Tympanoplasty    IR LIVER BIOPSY PERCUTANEOUS  2019    PANCREATECTOMY PARTIAL  2015    PERCUTANEOUS BIOPSY LIVER Right 2019    Procedure: Percutaneous Liver Biopsy;  Surgeon: Sean Guzman PA-C;  Location: [facility] OR    SPLENECTOMY      TONSILLECTOMY  10/01/2000    Tonsillectomy       Family History:    Family History   Problem Relation Age of Onset    Respiratory Mother         ASTHMA    Allergies Mother     Depression Mother     Chronic Obstructive Pulmonary Disease Mother     Anxiety Disorder Mother     Mental Illness Mother     Asthma Mother     Heart Disease Father         HEART VALVE REPLACEMENT    Hypertension Father     Diabetes Father     Depression Father     Anxiety Disorder Father     Mental Illness Father     Obesity Father     Respiratory Sister     Allergies Sister     Depression Sister     Respiratory Sister     Allergies Sister     Depression Sister     Respiratory Brother     Allergies Brother     Kidney Disease No family hx of        Social History:  Marital Status:  Single [1]  Social History     Tobacco Use    Smoking status: Former     Types: Vaping Device, Cigarettes     Quit date: 2023     Years since quittin.4     Passive exposure: Yes    Smokeless tobacco: Current   Vaping Use    Vaping Use: Every day    Substances: Nicotine    Devices: Disposable   Substance Use Topics    Alcohol use: No    Drug use: No        Medications:    ACCU-CHEK GUIDE test strip  acetaminophen (TYLENOL) 325 MG  "tablet  albuterol (VENTOLIN HFA) 108 (90 Base) MCG/ACT inhaler  ARIPiprazole (ABILIFY) 30 MG tablet  atorvastatin (LIPITOR) 10 MG tablet  Biotin 5 MG TABS  blood glucose (ONETOUCH VERIO IQ) test strip  Blood Glucose Monitoring Suppl (ONETOUCH VERIO FLEX SYSTEM) w/Device KIT  carvedilol (COREG) 12.5 MG tablet  cloZAPine (CLOZARIL) 100 MG tablet  COMPOUNDED NON-CONTROLLED SUBSTANCE (CMPD RX) - PHARMACY TO MIX COMPOUNDED MEDICATION  Continuous Blood Gluc Sensor (DEXCOM G6 SENSOR) MISC  Continuous Blood Gluc Transmit (DEXCOM G6 TRANSMITTER) MISC  fluocinolone acetonide 0.01 % OIL  FLUoxetine (PROZAC) 40 MG capsule  glucose (BD GLUCOSE) 4 g chewable tablet  hydrOXYzine (ATARAX) 25 MG tablet  Insulin Aspart FlexPen 100 UNIT/ML SOPN  insulin glargine (LANTUS SOLOSTAR) 100 UNIT/ML pen  insulin pen needle (32G X 6 MM) 32G X 6 MM miscellaneous  insulin pen needle (ULTICARE MINI) 31G X 6 MM miscellaneous  lamoTRIgine (LAMICTAL) 100 MG tablet  lisinopril (ZESTRIL) 5 MG tablet  loperamide (IMODIUM A-D) 2 MG tablet  loratadine (CLARITIN) 10 MG tablet  omeprazole (PRILOSEC) 20 MG DR capsule  OneTouch Delica Lancets 33G MISC  psyllium (METAMUCIL/KONSYL) 58.6 % powder  QUEtiapine (SEROQUEL) 25 MG tablet  topiramate (TOPAMAX) 50 MG tablet  traZODone (DESYREL) 50 MG tablet  vitamin B6 (PYRIDOXINE) 100 MG tablet          Review of Systems  Constitutional:  Negative for fever or recent illness.  Cardiovascular:  Negative for palpitations or chest discomfort.  Respiratory:  Negative for cough or shortness of breath.   Gastrointestinal:  Negative for abdominal pain, nausea or vomiting.   Musculoskeletal: Positive for right ankle and knee pain.  Negative for neck or back pain.  Neurological:  Negative for headache or loss of consciousness.    All others reviewed and are negative.      Physical Exam   BP: (!) 148/52  Pulse: 87  Temp: 98.2  F (36.8  C)  Resp: 18  Height: 162.6 cm (5' 4\")  Weight: 102.1 kg (225 lb)  SpO2: 100 %      Physical " Exam  Constitutional:  Well developed, well nourished.  Appears nontoxic and in no acute distress.   HENT:  Normocephalic and atraumatic.  Cardiovascular:  No cyanosis.   Respiratory:  Effort normal, no respiratory distress.   Musculoskeletal: Negative hip tenderness or pelvic instability.  No significant knee effusion, swelling, or inflammation.  Patient complains of tenderness to palpation of right proximal tibia and fibula.  Moderate tenderness and swelling of right ankle, more pronounced along lateral aspect.  L5 dorsiflexion and dorsal foot sensory intact.  S1 plantar flexion and plantar foot sensation intact.  Achilles tendon intact.  Sensation intact of all digits diffusely, including deep fibular distribution of first webbing space.  2/4 palpable dorsalis pedis and posterior tibial pulses.  No cyanosis and capillary refill less than 2 seconds in each digit.    Neurological:  Patient is alert.  Skin:  Skin is warm and dry.  Psychiatric:  Normal mood and affect.    ED Course                 Procedures             Critical Care time:  none               Results for orders placed or performed during the hospital encounter of 07/23/23 (from the past 24 hour(s))   Ankle XR, G/E 3 views, right    Narrative    EXAM: XR ANKLE RIGHT G/E 3 VIEWS  LOCATION: [facility]  DATE: 7/23/2023    INDICATION: Pain after unwitnessed fall  COMPARISON: None.      Impression    IMPRESSION: Soft tissue edema and swelling in the lower calf and ankle. Normal joint alignment. No fracture.   XR Knee Right 3 Views    Narrative    EXAM: XR KNEE RIGHT 3 VIEWS  LOCATION: [facility]  DATE: 7/23/2023    INDICATION: Pain after unwitnessed fall  COMPARISON: None.      Impression    IMPRESSION: Normal joint spaces and alignment. No fracture or joint effusion.     *Note: Due to a large number of results and/or encounters for the requested time period, some results have not been displayed. A  complete set of results can be found in Results Review.       Medications   acetaminophen (TYLENOL) tablet 1,000 mg (1,000 mg Oral $Given 7/23/23 1599)       Assessments & Plan (with Medical Decision Making)   Divina Delgadillo is a 38 year old female who presents to the emergency department for complaint of right ankle and knee pain.  Based on her symptoms and the clinical examination, there is no evidence to suggest bony deformity, skin injury, tendon rupture, joint laxity, or neurovascular deficits.  Radiographs were independently reviewed and without identifiable fracture or other acute bony abnormality.  Radiologist read is consistent.    Clinical impression is that her symptoms are likely caused by soft tissue injury such as sprains.  Suggest treating with rest, ice, elevation, and weightbearing as tolerated with ankle brace and crutches.  She may use over-the-counter acetaminophen/ibuprofen as directed.  It was recommended that they follow up if symptoms do not readily improve over the next week as it is possible to miss some injuries despite radiographic imaging.      Disclaimer:  This note consists of symbols derived from keyboarding, dictation, and/or voice recognition software.  As a result, there may be errors in the script that have gone undetected.  Please consider this when interpreting information found in the chart.       I have reviewed the nursing notes.    I have reviewed the findings, diagnosis, plan and need for follow up with the patient.           New Prescriptions    No medications on file       Final diagnoses:   Ankle injury, right, initial encounter   Knee injury, right, initial encounter       7/23/2023   [facility] EMERGENCY DEPT       Mychal Bo DO  07/23/23 1954

## 2023-07-23 NOTE — ED TRIAGE NOTES
Hx of brain tumor and pt does get intermittent dizziness.  Pt fell in the kitchen today at 1700 and rolled right ankle with pain.  Pt lives at home alone.        Triage Assessment       Row Name 07/23/23 4984       Triage Assessment (Adult)    Airway WDL WDL       Respiratory WDL    Respiratory WDL WDL       Peripheral/Neurovascular WDL    Peripheral Neurovascular WDL WDL

## 2023-07-24 ENCOUNTER — MYC MEDICAL ADVICE (OUTPATIENT)
Dept: FAMILY MEDICINE | Facility: CLINIC | Age: 38
End: 2023-07-24
Payer: COMMERCIAL

## 2023-07-24 NOTE — TELEPHONE ENCOUNTER
José Luist sent and will await patient's response.    Mer Arthur RN  [facility]

## 2023-07-28 DIAGNOSIS — K21.9 GASTROESOPHAGEAL REFLUX DISEASE WITHOUT ESOPHAGITIS: ICD-10-CM

## 2023-07-31 ENCOUNTER — LAB (OUTPATIENT)
Dept: LAB | Facility: CLINIC | Age: 38
End: 2023-07-31
Payer: COMMERCIAL

## 2023-07-31 DIAGNOSIS — F25.0 SCHIZOAFFECTIVE DISORDER, BIPOLAR TYPE (H): ICD-10-CM

## 2023-07-31 LAB
BASOPHILS # BLD AUTO: 0.1 10E3/UL (ref 0–0.2)
BASOPHILS NFR BLD AUTO: 1 %
EOSINOPHIL # BLD AUTO: 0.3 10E3/UL (ref 0–0.7)
EOSINOPHIL NFR BLD AUTO: 2 %
ERYTHROCYTE [DISTWIDTH] IN BLOOD BY AUTOMATED COUNT: 14.5 % (ref 10–15)
HCT VFR BLD AUTO: 37.8 % (ref 35–47)
HGB BLD-MCNC: 11.8 G/DL (ref 11.7–15.7)
IMM GRANULOCYTES # BLD: 0.1 10E3/UL
IMM GRANULOCYTES NFR BLD: 1 %
LYMPHOCYTES # BLD AUTO: 3.9 10E3/UL (ref 0.8–5.3)
LYMPHOCYTES NFR BLD AUTO: 29 %
MCH RBC QN AUTO: 30.2 PG (ref 26.5–33)
MCHC RBC AUTO-ENTMCNC: 31.2 G/DL (ref 31.5–36.5)
MCV RBC AUTO: 97 FL (ref 78–100)
MONOCYTES # BLD AUTO: 1 10E3/UL (ref 0–1.3)
MONOCYTES NFR BLD AUTO: 7 %
NEUTROPHILS # BLD AUTO: 7.9 10E3/UL (ref 1.6–8.3)
NEUTROPHILS NFR BLD AUTO: 60 %
PLATELET # BLD AUTO: 435 10E3/UL (ref 150–450)
RBC # BLD AUTO: 3.91 10E6/UL (ref 3.8–5.2)
WBC # BLD AUTO: 13.3 10E3/UL (ref 4–11)

## 2023-07-31 PROCEDURE — 36415 COLL VENOUS BLD VENIPUNCTURE: CPT

## 2023-07-31 PROCEDURE — 85025 COMPLETE CBC W/AUTO DIFF WBC: CPT

## 2023-08-01 ENCOUNTER — VIRTUAL VISIT (OUTPATIENT)
Dept: ENDOCRINOLOGY | Facility: CLINIC | Age: 38
End: 2023-08-01
Payer: COMMERCIAL

## 2023-08-01 VITALS — BODY MASS INDEX: 38.41 KG/M2 | HEIGHT: 64 IN | WEIGHT: 225 LBS

## 2023-08-01 DIAGNOSIS — E11.42 TYPE 2 DIABETES MELLITUS WITH DIABETIC POLYNEUROPATHY, WITH LONG-TERM CURRENT USE OF INSULIN (H): ICD-10-CM

## 2023-08-01 DIAGNOSIS — Z79.4 TYPE 2 DIABETES MELLITUS WITH DIABETIC POLYNEUROPATHY, WITH LONG-TERM CURRENT USE OF INSULIN (H): ICD-10-CM

## 2023-08-01 DIAGNOSIS — E66.01 MORBID OBESITY (H): ICD-10-CM

## 2023-08-01 DIAGNOSIS — E11.65 UNCONTROLLED TYPE 2 DIABETES MELLITUS WITH HYPERGLYCEMIA (H): ICD-10-CM

## 2023-08-01 PROCEDURE — 99204 OFFICE O/P NEW MOD 45 MIN: CPT | Mod: VID | Performed by: INTERNAL MEDICINE

## 2023-08-01 RX ORDER — BLOOD-GLUCOSE SENSOR
1 EACH MISCELLANEOUS
Qty: 15 EACH | Refills: 4 | Status: SHIPPED | OUTPATIENT
Start: 2023-08-01 | End: 2024-02-05

## 2023-08-01 RX ORDER — BLOOD-GLUCOSE TRANSMITTER
1 EACH MISCELLANEOUS
Qty: 12 EACH | Refills: 4 | Status: SHIPPED | OUTPATIENT
Start: 2023-08-01 | End: 2023-12-05 | Stop reason: ALTCHOICE

## 2023-08-01 RX ORDER — ELECTROLYTES/DEXTROSE
100 SOLUTION, ORAL ORAL DAILY
COMMUNITY
Start: 2023-05-09 | End: 2023-10-11

## 2023-08-01 RX ORDER — SUBCUTANEOUS INSULIN PUMP
1 EACH MISCELLANEOUS DAILY
Qty: 1 KIT | Refills: 0 | Status: SHIPPED | OUTPATIENT
Start: 2023-08-01

## 2023-08-01 RX ORDER — INSULIN PUMP SYRINGE, 3 ML
1 EACH MISCELLANEOUS
Qty: 10 EACH | Refills: 11 | Status: SHIPPED | OUTPATIENT
Start: 2023-08-01 | End: 2023-08-02 | Stop reason: ALTCHOICE

## 2023-08-01 RX ORDER — INSULIN ASPART 100 [IU]/ML
INJECTION, SOLUTION INTRAVENOUS; SUBCUTANEOUS
Qty: 60 ML | Refills: 4 | Status: SHIPPED | OUTPATIENT
Start: 2023-08-01 | End: 2023-10-10

## 2023-08-01 RX ORDER — INFUSION SET FOR INSULIN PUMP
1 INFUSION SETS-PARAPHERNALIA MISCELLANEOUS
Qty: 10 EACH | Refills: 6 | Status: SHIPPED | OUTPATIENT
Start: 2023-08-01

## 2023-08-01 ASSESSMENT — PAIN SCALES - GENERAL: PAINLEVEL: EXTREME PAIN (8)

## 2023-08-01 NOTE — PATIENT INSTRUCTIONS
Reduce lantus to 32 units.  Reduce lunch dose of novolog by 1 unit.   Reduce lunch time novolog dose by another unit for every low blood sugar you get within 4 hours.     Set up an appointment with Katiana for carb counting and pump training.  We will work to get you set up with the Medtronic 780G pump with Guardian 4 sensor.

## 2023-08-01 NOTE — PROGRESS NOTES
Divina Delgadillo is a 38 year old who is being evaluated via a billable video visit.      How would you like to obtain your AVS? MyChart  If the video visit is dropped, the invitation should be resent by: Text to cell phone: [phone]  Will anyone else be joining your video visit? No  {If patient encounters technical issues they should call [phone]  Endocrine Consult Video visit note    Attending Assessment/Plan :        Uncontrolled type 2 diabetes mellitus with hyperglycemia (H)  Morbid obesity (H)    Assessment:  -above goal a1c of <7% without hypoglycemia  -benefiting from CGM and using consistently  -would benefit from hybrid closed loop pump which patient is interested in.  Discussed tandem and medtronic today.  Would like to go with medtronic    Plan:  -reduce lantus to 32  -reduce lunch time aspart dose by 1 unit.  Continue down titrating for any post prandial hypos  -continue dexcom g6 until starting pump, at which time she will switch to guardian 4 CGM. Sent invite to dexcom portal for now.   -will get patient set up with Medtronic 780G pump with guardian 4 sensor.  Will reach out to patients [employer] who she works closely with to set up CR/CF training as well as general pump ed.  Can get her set up with medtronic reps too for medtronic specific pump training  -based on current dosing on MDI, approximate starting settings will be (ok to be adjusted by [facility] [place] at the time of pump start if doses are changed per [facility] [place] judgement):   -basal rate 1.3 unit(s)/hour   -CR 1:10   -CF 1:30  -will recheck a1c and fructosamine at next visit to see if a1c is accurate or not for her     I have independently reviewed and interpreted labs, imaging as indicated.     RTC 4 months with labs 1-2 weeks before followup    Chief complaint:  Divina is a 38 year old female seen for type 2 diabetes    HISTORY OF PRESENT ILLNESS    Divina is a 38F with t2dm referred to endocrinology for management of type 2 diabetes.  She is  interested in discussing insulin pump options today.      Diabetes diagnosed in about 2011.  Started insulin in 2017.      Complications: nephropathy, neuropathy     Current regimen:  Lantus - 34 units qam   Novolog - 7 units for big meals, medium 5, snack 3 units      Low blood sugars - reports daily. Happening mostly in the early afternoon.  These most often happen when she skips lunch.  Mornings are .  Occasional middle of the night lows.  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      GLP-1 contraindicated given hx of pancreatitis.     Comprehensive diabetes education: completed    Unable to download CGM today but discussed blood sugars with patient.  Invite for dexcom portal sent.     Endocrine relevant labs and imaging are as follows:    Component      Latest Ref Rng 3/22/2023  9:09 AM 7/13/2023  2:19 PM   Sodium      136 - 145 mmol/L  144    Potassium      3.4 - 5.3 mmol/L  4.5    Chloride      98 - 107 mmol/L  112 (H)    Carbon Dioxide (CO2)      22 - 29 mmol/L  19 (L)    Anion Gap      7 - 15 mmol/L  13    Urea Nitrogen      6.0 - 20.0 mg/dL  34.5 (H)    Creatinine      0.51 - 0.95 mg/dL  1.70 (H)    Calcium      8.6 - 10.0 mg/dL  9.7    Glucose      70 - 99 mg/dL  79    GFR Estimate      >60 mL/min/1.73m2  39 (L)    Creatinine Urine      mg/dL 164.0     Albumin Urine mg/L      mg/L 16.1     Albumin Urine mg/g Cr      0.00 - 25.00 mg/g Cr 9.82     Hemoglobin A1C      0.0 - 5.6 %  8.3 (H)       Legend:  (H) High  (L) Low      REVIEW OF SYSTEMS    10 system ROS otherwise as per the HPI or negative    Past Medical History  Past Medical History:   Diagnosis Date    Acute pain of left knee 03/22/2019    Acute-on-chronic kidney injury (H) 03/22/2019    ARF (acute renal failure) (H) 03/23/2019    Cervical high risk HPV (human papillomavirus) test positive 06/20/2017    Deep venous thrombosis of arm (H) 01/06/2017    ultrasound 1/6/2017-  venous thrombus in the  antecubital fossa region and the left forearm as described    Depressive disorder     Depressive disorder, not elsewhere classified     DVT (deep venous thrombosis) (H)     Esophageal reflux     GERD    Mild intermittent asthma     Other specified types of schizophrenia, unspecified condition        Medications  Current Outpatient Medications   Medication Sig Dispense Refill    ACCU-CHEK GUIDE test strip Use to test blood sugar 3 times daily or as directed. 150 strip 1    acetaminophen (TYLENOL) 325 MG tablet Take 325-650 mg by mouth 2 tab every 4-6 hours as needed, do not exceed 9 tabs in 24hours      albuterol (VENTOLIN HFA) 108 (90 Base) MCG/ACT inhaler INHALE 2 PUFFS INTO THE LUNGS EVERY SIX  HOURS AS NEEDED FOR SHORTNESS OF BREATH 18 g 10    ARIPiprazole (ABILIFY) 30 MG tablet Take 30 mg by mouth daily      atorvastatin (LIPITOR) 10 MG tablet Take 1 tablet (10 mg) by mouth daily 90 tablet 2    Biotin 5 MG TABS Take 1 tablet by mouth daily 30 tablet 5    Biotin 5000 MCG CAPS Take 1 tablet by mouth daily at 2 pm      blood glucose (ONETOUCH VERIO IQ) test strip Use to test blood sugar up to 3 times daily 100 strip 11    Blood Glucose Monitoring Suppl (ONETOUCH VERIO FLEX SYSTEM) w/Device KIT 1 each daily 1 kit 0    carvedilol (COREG) 12.5 MG tablet Take 1 tablet (12.5 mg) by mouth 2 times daily 180 tablet 3    cloZAPine (CLOZARIL) 100 MG tablet Take 50 mg by mouth 2 times daily      Continuous Blood Gluc Sensor (DEXCOM G6 SENSOR) MISC 1 each every 10 days. Change every 10 days. USE TO READ BLOOD SUGARS PER  INSTRUCTIONS 12 each 1    Continuous Blood Gluc Sensor (GUARDIAN 4 GLUCOSE SENSOR) MISC 1 each every 7 days Change sensor every 7 days 15 each 4    Continuous Blood Gluc Transmit (DEXCOM G6 TRANSMITTER) MISC 1 each every 3 months Change every 3 months. USE TO READ BLOOD SUGARS PER  INSTRUCTIONS 1 each 1    Continuous Blood Gluc Transmit (GUARDIAN 4 TRANSMITTER) MISC 1 each every 7 days  "Change sensor every 7 days 12 each 4    FLUoxetine (PROZAC) 40 MG capsule Take 40 mg by mouth daily      glucose (BD GLUCOSE) 4 g chewable tablet Take 1 tablet by mouth every hour as needed for low blood sugar 30 tablet 4    hydrOXYzine (ATARAX) 25 MG tablet Take 1 Tablet (25 mg) by mouth 3 times daily if needed for Anxiety.      Insulin Aspart FlexPen 100 UNIT/ML SOPN Inject 1-14 Units Subcutaneous 3 times daily (with meals) 3 times daily (with meals) (Use Novlog on meal size small meal 15-30gm carb: 3 units, average meal 45-60gm carb: 5 units, large meal 60gm+ carb: 7 units + pre meal correction 1:40>150. Max dose per 24 hours is 50 units) 30 mL 1    insulin glargine (LANTUS SOLOSTAR) 100 UNIT/ML pen Inject 34 Units Subcutaneous every morning 45 mL 1    Insulin Infusion Pump (MINIMED 780G INSULIN PUMP) KIT 1 each daily SmartGuard Target: 100; Active Insulin Time: 2 hours (recommended); SmartGuardTM Warmup/Manual Mode: Suspend before low (recommended) 1 kit 0    Insulin Infusion Pump Supplies (EXTENDED INFUSION SET 23\"/6MM) MISC 1 each every 7 days Max Total Daily Dose 70 units; Change infusion set & reservoir every 7 days (recommended) 10 each 6    Insulin Infusion Pump Supplies (MINIMED PUMP RESERVOIR 3ML) MISC 1 each every 7 days Change infusion set and reservoir every 7 days 10 each 11    insulin pen needle (32G X 6 MM) 32G X 6 MM miscellaneous Use 4 pen needles daily. 150 each 10    insulin pen needle (ULTICARE MINI) 31G X 6 MM miscellaneous USE 1 PEN NEEDLE 4 times daily 200 each 10    lamoTRIgine (LAMICTAL) 100 MG tablet Take 100 mg by mouth At Bedtime      lisinopril (ZESTRIL) 5 MG tablet Take 1 tablet (5 mg) by mouth daily 90 tablet 3    loperamide (IMODIUM A-D) 2 MG tablet 2 caplets after loose stool then 1 after each loose stool do not exceed 4 in 24hrs      loratadine (CLARITIN) 10 MG tablet Take 1 tablet (10 mg) by mouth every morning 28 tablet 10    omeprazole (PRILOSEC) 20 MG DR capsule TAKE 1 " CAPSULE BY MOUTH EVERY DAY 28 capsule 2    OneTouch Delica Lancets 33G MISC 1 each 4 times daily 100 each 11    Pyridoxine HCl (VITAMIN B6) 100 MG TABS Take 100 mg by mouth daily      QUEtiapine (SEROQUEL) 25 MG tablet Take 25 mg by mouth 1 tablet 4 times daily as needed for agitation or hallucinations      topiramate (TOPAMAX) 50 MG tablet Take 1 tablet (50 mg) by mouth 2 times daily 60 tablet 3    traZODone (DESYREL) 50 MG tablet Take 1-2 Tablets ( mg) by mouth at bedtime if needed for Sleep.      COMPOUNDED NON-CONTROLLED SUBSTANCE (CMPD RX) - PHARMACY TO MIX COMPOUNDED MEDICATION Chloramphenicol 50 mg, sulfamethoxazole 50 mg, amphotericin B 5 mg, hydrocortisone 1 mg. 2-3 puffs to affected ear PRN itching/drainage (Patient not taking: Reported on 7/13/2023) 10 capsule 1    fluocinolone acetonide 0.01 % OIL Place 4 drops in ear(s) 2 times daily as needed (ear itching) (Patient not taking: Reported on 7/13/2023) 20 mL 3    psyllium (METAMUCIL/KONSYL) 58.6 % powder Take 6 g (1 teaspoonful) by mouth daily (Patient not taking: Reported on 7/19/2023) 425 g 1    vitamin B6 (PYRIDOXINE) 100 MG tablet Take 1 tablet (100 mg) by mouth daily (Patient not taking: Reported on 7/13/2023) 30 tablet 2       Allergies  Allergies   Allergen Reactions    Acetaminophen Other (See Comments)     pt previously tried to overdose on it, PMD does not want pt taking per pt.     Latex Rash       Family History  family history includes Allergies in her brother, mother, sister, and sister; Anxiety Disorder in her father and mother; Asthma in her mother; Chronic Obstructive Pulmonary Disease in her mother; Depression in her father, mother, sister, and sister; Diabetes in her father; Heart Disease in her father; Hypertension in her father; Mental Illness in her father and mother; Obesity in her father; Respiratory in her brother, mother, sister, and sister.    Social History  Social History     Tobacco Use    Smoking status: Former      Types: Vaping Device, Cigarettes     Quit date: 2023     Years since quittin.4     Passive exposure: Yes    Smokeless tobacco: Current    Tobacco comments:     A pack every 3 days   Vaping Use    Vaping Use: Every day    Substances: Nicotine    Devices: Disposable   Substance Use Topics    Alcohol use: No    Drug use: No       Physical Exam  Body mass index is 38.62 kg/m .  GENERAL: no distress  SKIN: Visible skin clear. No significant rash, abnormal pigmentation or lesions.  EYES: Eyes grossly normal to inspection.  No discharge or erythema, or obvious scleral/conjunctival abnormalities.  NECK: visible goiter is not present; no visible neck masses  RESP: No audible wheeze, cough, or visible cyanosis.  No visible retractions or increased work of breathing.    NEURO: Awake, alert, responds appropriately to questions.  Mentation and speech fluent.  PSYCH:affect normal and appearance well-groomed.    Video-Visit Details    Type of service:  Video Visit    Video Start Time (time video started): 1354    Video End Time (time video stopped): 1433    Originating Location (pt. Location): Home      Distant Location (provider location):  Off-site    Mode of Communication:  Video Conference via [facility]    Physician has received verbal consent for a Video Visit from the patient? Yes    I spent a total of 48 minutes on the day of this new patient visit on chart review, lab review, coordinating care, exam, and counseling of patient

## 2023-08-01 NOTE — LETTER
8/1/2023         RE: Divina Delgadillo  [address] [facility] [address]        Dear Colleague,    Thank you for referring your patient, Divina Delgadillo, to the [facility] ENDOCRINOLOGY. Please see a copy of my visit note below.    Divina Delgadillo is a 38 year old who is being evaluated via a billable video visit.      How would you like to obtain your AVS? MyChart  If the video visit is dropped, the invitation should be resent by: Text to cell phone: [phone]  Will anyone else be joining your video visit? No  {If patient encounters technical issues they should call [phone]  Endocrine Consult Video visit note    Attending Assessment/Plan :        Uncontrolled type 2 diabetes mellitus with hyperglycemia (H)  Morbid obesity (H)    Assessment:  -above goal a1c of <7% without hypoglycemia  -benefiting from CGM and using consistently  -would benefit from hybrid closed loop pump which patient is interested in.  Discussed tandem and medtronic today.  Would like to go with medtronic    Plan:  -reduce lantus to 32  -reduce lunch time aspart dose by 1 unit.  Continue down titrating for any post prandial hypos  -continue dexcom g6 until starting pump, at which time she will switch to guardian 4 CGM. Sent invite to dexcom portal for now.   -will get patient set up with Medtronic 780G pump with guardian 4 sensor.  Will reach out to patients CDC who she works closely with to set up CR/CF training as well as general pump ed.  Can get her set up with medtronic reps too for medtronic specific pump training  -based on current dosing on MDI, approximate starting settings will be (ok to be adjusted by [facility] at the time of pump start if doses are changed per [facility] judgement):   -basal rate 1.3 unit(s)/hour   -CR 1:10   -CF 1:30  -will recheck a1c and fructosamine at next visit to see if a1c is accurate or not for her     I have independently reviewed and interpreted labs, imaging as indicated.      RTC 4 months with labs 1-2 weeks before followup    Chief complaint:  Divina is a 38 year old female seen for type 2 diabetes    HISTORY OF PRESENT ILLNESS    Divina is a 38F with t2dm referred to endocrinology for management of type 2 diabetes.  She is interested in discussing insulin pump options today.      Diabetes diagnosed in about 2011.  Started insulin in 2017.      Complications: nephropathy, neuropathy     Current regimen:  Lantus - 34 units qam   Novolog - 7 units for big meals, medium 5, snack 3 units      Low blood sugars - reports daily. Happening mostly in the early afternoon.  These most often happen when she skips lunch.  Mornings are .  Occasional middle of the night lows.  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      GLP-1 contraindicated given hx of pancreatitis.     Comprehensive diabetes education: completed    Unable to download CGM today but discussed blood sugars with patient.  Invite for dexcom portal sent.     Endocrine relevant labs and imaging are as follows:    Component      Latest Ref Rng 3/22/2023  9:09 AM 7/13/2023  2:19 PM   Sodium      136 - 145 mmol/L  144    Potassium      3.4 - 5.3 mmol/L  4.5    Chloride      98 - 107 mmol/L  112 (H)    Carbon Dioxide (CO2)      22 - 29 mmol/L  19 (L)    Anion Gap      7 - 15 mmol/L  13    Urea Nitrogen      6.0 - 20.0 mg/dL  34.5 (H)    Creatinine      0.51 - 0.95 mg/dL  1.70 (H)    Calcium      8.6 - 10.0 mg/dL  9.7    Glucose      70 - 99 mg/dL  79    GFR Estimate      >60 mL/min/1.73m2  39 (L)    Creatinine Urine      mg/dL 164.0     Albumin Urine mg/L      mg/L 16.1     Albumin Urine mg/g Cr      0.00 - 25.00 mg/g Cr 9.82     Hemoglobin A1C      0.0 - 5.6 %  8.3 (H)       Legend:  (H) High  (L) Low      REVIEW OF SYSTEMS    10 system ROS otherwise as per the HPI or negative    Past Medical History  Past Medical History:   Diagnosis Date     Acute pain of left knee 03/22/2019      Acute-on-chronic kidney injury (H) 03/22/2019     ARF (acute renal failure) (H) 03/23/2019     Cervical high risk HPV (human papillomavirus) test positive 06/20/2017     Deep venous thrombosis of arm (H) 01/06/2017    ultrasound 1/6/2017-  venous thrombus in the antecubital fossa region and the left forearm as described     Depressive disorder      Depressive disorder, not elsewhere classified      DVT (deep venous thrombosis) (H)      Esophageal reflux     GERD     Mild intermittent asthma      Other specified types of schizophrenia, unspecified condition        Medications  Current Outpatient Medications   Medication Sig Dispense Refill     ACCU-CHEK GUIDE test strip Use to test blood sugar 3 times daily or as directed. 150 strip 1     acetaminophen (TYLENOL) 325 MG tablet Take 325-650 mg by mouth 2 tab every 4-6 hours as needed, do not exceed 9 tabs in 24hours       albuterol (VENTOLIN HFA) 108 (90 Base) MCG/ACT inhaler INHALE 2 PUFFS INTO THE LUNGS EVERY SIX  HOURS AS NEEDED FOR SHORTNESS OF BREATH 18 g 10     ARIPiprazole (ABILIFY) 30 MG tablet Take 30 mg by mouth daily       atorvastatin (LIPITOR) 10 MG tablet Take 1 tablet (10 mg) by mouth daily 90 tablet 2     Biotin 5 MG TABS Take 1 tablet by mouth daily 30 tablet 5     Biotin 5000 MCG CAPS Take 1 tablet by mouth daily at 2 pm       blood glucose (ONETOUCH VERIO IQ) test strip Use to test blood sugar up to 3 times daily 100 strip 11     Blood Glucose Monitoring Suppl (ONETOUCH VERIO FLEX SYSTEM) w/Device KIT 1 each daily 1 kit 0     carvedilol (COREG) 12.5 MG tablet Take 1 tablet (12.5 mg) by mouth 2 times daily 180 tablet 3     cloZAPine (CLOZARIL) 100 MG tablet Take 50 mg by mouth 2 times daily       Continuous Blood Gluc Sensor (DEXCOM G6 SENSOR) MISC 1 each every 10 days. Change every 10 days. USE TO READ BLOOD SUGARS PER  INSTRUCTIONS 12 each 1     Continuous Blood Gluc Sensor (GUARDIAN 4 GLUCOSE SENSOR) MISC 1 each every 7 days Change  "sensor every 7 days 15 each 4     Continuous Blood Gluc Transmit (DEXCOM G6 TRANSMITTER) MISC 1 each every 3 months Change every 3 months. USE TO READ BLOOD SUGARS PER  INSTRUCTIONS 1 each 1     Continuous Blood Gluc Transmit (GUARDIAN 4 TRANSMITTER) MISC 1 each every 7 days Change sensor every 7 days 12 each 4     FLUoxetine (PROZAC) 40 MG capsule Take 40 mg by mouth daily       glucose (BD GLUCOSE) 4 g chewable tablet Take 1 tablet by mouth every hour as needed for low blood sugar 30 tablet 4     hydrOXYzine (ATARAX) 25 MG tablet Take 1 Tablet (25 mg) by mouth 3 times daily if needed for Anxiety.       Insulin Aspart FlexPen 100 UNIT/ML SOPN Inject 1-14 Units Subcutaneous 3 times daily (with meals) 3 times daily (with meals) (Use Novlog on meal size small meal 15-30gm carb: 3 units, average meal 45-60gm carb: 5 units, large meal 60gm+ carb: 7 units + pre meal correction 1:40>150. Max dose per 24 hours is 50 units) 30 mL 1     insulin glargine (LANTUS SOLOSTAR) 100 UNIT/ML pen Inject 34 Units Subcutaneous every morning 45 mL 1     Insulin Infusion Pump (MINIMED 780G INSULIN PUMP) KIT 1 each daily SmartGuard Target: 100; Active Insulin Time: 2 hours (recommended); SmartGuardTM Warmup/Manual Mode: Suspend before low (recommended) 1 kit 0     Insulin Infusion Pump Supplies (EXTENDED INFUSION SET 23\"/6MM) MISC 1 each every 7 days Max Total Daily Dose 70 units; Change infusion set & reservoir every 7 days (recommended) 10 each 6     Insulin Infusion Pump Supplies (MINIMED PUMP RESERVOIR 3ML) MISC 1 each every 7 days Change infusion set and reservoir every 7 days 10 each 11     insulin pen needle (32G X 6 MM) 32G X 6 MM miscellaneous Use 4 pen needles daily. 150 each 10     insulin pen needle (ULTICARE MINI) 31G X 6 MM miscellaneous USE 1 PEN NEEDLE 4 times daily 200 each 10     lamoTRIgine (LAMICTAL) 100 MG tablet Take 100 mg by mouth At Bedtime       lisinopril (ZESTRIL) 5 MG tablet Take 1 tablet (5 mg) by " mouth daily 90 tablet 3     loperamide (IMODIUM A-D) 2 MG tablet 2 caplets after loose stool then 1 after each loose stool do not exceed 4 in 24hrs       loratadine (CLARITIN) 10 MG tablet Take 1 tablet (10 mg) by mouth every morning 28 tablet 10     omeprazole (PRILOSEC) 20 MG DR capsule TAKE 1 CAPSULE BY MOUTH EVERY DAY 28 capsule 2     OneTouch Delica Lancets 33G MISC 1 each 4 times daily 100 each 11     Pyridoxine HCl (VITAMIN B6) 100 MG TABS Take 100 mg by mouth daily       QUEtiapine (SEROQUEL) 25 MG tablet Take 25 mg by mouth 1 tablet 4 times daily as needed for agitation or hallucinations       topiramate (TOPAMAX) 50 MG tablet Take 1 tablet (50 mg) by mouth 2 times daily 60 tablet 3     traZODone (DESYREL) 50 MG tablet Take 1-2 Tablets ( mg) by mouth at bedtime if needed for Sleep.       COMPOUNDED NON-CONTROLLED SUBSTANCE (CMPD RX) - PHARMACY TO MIX COMPOUNDED MEDICATION Chloramphenicol 50 mg, sulfamethoxazole 50 mg, amphotericin B 5 mg, hydrocortisone 1 mg. 2-3 puffs to affected ear PRN itching/drainage (Patient not taking: Reported on 7/13/2023) 10 capsule 1     fluocinolone acetonide 0.01 % OIL Place 4 drops in ear(s) 2 times daily as needed (ear itching) (Patient not taking: Reported on 7/13/2023) 20 mL 3     psyllium (METAMUCIL/KONSYL) 58.6 % powder Take 6 g (1 teaspoonful) by mouth daily (Patient not taking: Reported on 7/19/2023) 425 g 1     vitamin B6 (PYRIDOXINE) 100 MG tablet Take 1 tablet (100 mg) by mouth daily (Patient not taking: Reported on 7/13/2023) 30 tablet 2       Allergies  Allergies   Allergen Reactions     Acetaminophen Other (See Comments)     pt previously tried to overdose on it, PMD does not want pt taking per pt.      Latex Rash       Family History  family history includes Allergies in her brother, mother, sister, and sister; Anxiety Disorder in her father and mother; Asthma in her mother; Chronic Obstructive Pulmonary Disease in her mother; Depression in her father,  mother, sister, and sister; Diabetes in her father; Heart Disease in her father; Hypertension in her father; Mental Illness in her father and mother; Obesity in her father; Respiratory in her brother, mother, sister, and sister.    Social History  Social History     Tobacco Use     Smoking status: Former     Types: Vaping Device, Cigarettes     Quit date: 2023     Years since quittin.4     Passive exposure: Yes     Smokeless tobacco: Current     Tobacco comments:     A pack every 3 days   Vaping Use     Vaping Use: Every day     Substances: Nicotine     Devices: Disposable   Substance Use Topics     Alcohol use: No     Drug use: No       Physical Exam  Body mass index is 38.62 kg/m .  GENERAL: no distress  SKIN: Visible skin clear. No significant rash, abnormal pigmentation or lesions.  EYES: Eyes grossly normal to inspection.  No discharge or erythema, or obvious scleral/conjunctival abnormalities.  NECK: visible goiter is not present; no visible neck masses  RESP: No audible wheeze, cough, or visible cyanosis.  No visible retractions or increased work of breathing.    NEURO: Awake, alert, responds appropriately to questions.  Mentation and speech fluent.  PSYCH:affect normal and appearance well-groomed.    Video-Visit Details    Type of service:  Video Visit    Video Start Time (time video started): 1354    Video End Time (time video stopped): 1433    Originating Location (pt. Location): Home      Distant Location (provider location):  Off-site    Mode of Communication:  Video Conference via [employer]    Physician has received verbal consent for a Video Visit from the patient? Yes    I spent a total of 48 minutes on the day of this new patient visit on chart review, lab review, coordinating care, exam, and counseling of patient      Again, thank you for allowing me to participate in the care of your patient.        Sincerely,        Christophe Beckman MD

## 2023-08-02 ENCOUNTER — TELEPHONE (OUTPATIENT)
Dept: ENDOCRINOLOGY | Facility: CLINIC | Age: 38
End: 2023-08-02

## 2023-08-02 ENCOUNTER — LAB (OUTPATIENT)
Dept: LAB | Facility: CLINIC | Age: 38
End: 2023-08-02
Payer: COMMERCIAL

## 2023-08-02 ENCOUNTER — TELEPHONE (OUTPATIENT)
Dept: INTERVENTIONAL RADIOLOGY/VASCULAR | Facility: CLINIC | Age: 38
End: 2023-08-02

## 2023-08-02 ENCOUNTER — ANCILLARY PROCEDURE (OUTPATIENT)
Dept: GENERAL RADIOLOGY | Facility: CLINIC | Age: 38
End: 2023-08-02
Attending: FAMILY MEDICINE
Payer: COMMERCIAL

## 2023-08-02 ENCOUNTER — OFFICE VISIT (OUTPATIENT)
Dept: FAMILY MEDICINE | Facility: CLINIC | Age: 38
End: 2023-08-02
Payer: COMMERCIAL

## 2023-08-02 VITALS
HEART RATE: 83 BPM | WEIGHT: 225 LBS | OXYGEN SATURATION: 98 % | HEIGHT: 64 IN | BODY MASS INDEX: 38.41 KG/M2 | DIASTOLIC BLOOD PRESSURE: 60 MMHG | RESPIRATION RATE: 16 BRPM | TEMPERATURE: 98.3 F | SYSTOLIC BLOOD PRESSURE: 108 MMHG

## 2023-08-02 DIAGNOSIS — R55 SYNCOPE, UNSPECIFIED SYNCOPE TYPE: ICD-10-CM

## 2023-08-02 DIAGNOSIS — R60.0 LOCALIZED EDEMA: ICD-10-CM

## 2023-08-02 DIAGNOSIS — M79.671 RIGHT FOOT PAIN: ICD-10-CM

## 2023-08-02 DIAGNOSIS — E11.65 UNCONTROLLED TYPE 2 DIABETES MELLITUS WITH HYPERGLYCEMIA (H): ICD-10-CM

## 2023-08-02 DIAGNOSIS — R55 SYNCOPE, UNSPECIFIED SYNCOPE TYPE: Primary | ICD-10-CM

## 2023-08-02 LAB
BASOPHILS # BLD AUTO: 0.2 10E3/UL (ref 0–0.2)
BASOPHILS NFR BLD AUTO: 1 %
EOSINOPHIL # BLD AUTO: 0.3 10E3/UL (ref 0–0.7)
EOSINOPHIL NFR BLD AUTO: 2 %
ERYTHROCYTE [DISTWIDTH] IN BLOOD BY AUTOMATED COUNT: 14.5 % (ref 10–15)
ERYTHROCYTE [SEDIMENTATION RATE] IN BLOOD BY WESTERGREN METHOD: 55 MM/HR (ref 0–20)
HCT VFR BLD AUTO: 36.3 % (ref 35–47)
HGB BLD-MCNC: 11.2 G/DL (ref 11.7–15.7)
IMM GRANULOCYTES # BLD: 0.1 10E3/UL
IMM GRANULOCYTES NFR BLD: 1 %
LYMPHOCYTES # BLD AUTO: 4.2 10E3/UL (ref 0.8–5.3)
LYMPHOCYTES NFR BLD AUTO: 28 %
MCH RBC QN AUTO: 30.1 PG (ref 26.5–33)
MCHC RBC AUTO-ENTMCNC: 30.9 G/DL (ref 31.5–36.5)
MCV RBC AUTO: 98 FL (ref 78–100)
MONOCYTES # BLD AUTO: 1 10E3/UL (ref 0–1.3)
MONOCYTES NFR BLD AUTO: 7 %
NEUTROPHILS # BLD AUTO: 9.4 10E3/UL (ref 1.6–8.3)
NEUTROPHILS NFR BLD AUTO: 62 %
PLATELET # BLD AUTO: 430 10E3/UL (ref 150–450)
RBC # BLD AUTO: 3.72 10E6/UL (ref 3.8–5.2)
WBC # BLD AUTO: 15 10E3/UL (ref 4–11)

## 2023-08-02 PROCEDURE — 84681 ASSAY OF C-PEPTIDE: CPT

## 2023-08-02 PROCEDURE — 84443 ASSAY THYROID STIM HORMONE: CPT

## 2023-08-02 PROCEDURE — 36415 COLL VENOUS BLD VENIPUNCTURE: CPT

## 2023-08-02 PROCEDURE — 73630 X-RAY EXAM OF FOOT: CPT | Mod: TC | Performed by: RADIOLOGY

## 2023-08-02 PROCEDURE — 85652 RBC SED RATE AUTOMATED: CPT

## 2023-08-02 PROCEDURE — 85025 COMPLETE CBC W/AUTO DIFF WBC: CPT

## 2023-08-02 PROCEDURE — 86140 C-REACTIVE PROTEIN: CPT

## 2023-08-02 PROCEDURE — 80053 COMPREHEN METABOLIC PANEL: CPT

## 2023-08-02 PROCEDURE — 99214 OFFICE O/P EST MOD 30 MIN: CPT | Performed by: FAMILY MEDICINE

## 2023-08-02 RX ORDER — INSULIN PUMP SYRINGE, 3 ML
1 EACH MISCELLANEOUS
Qty: 10 EACH | Refills: 11 | Status: SHIPPED | OUTPATIENT
Start: 2023-08-02

## 2023-08-02 RX ORDER — INSULIN ASPART 100 [IU]/ML
1-14 INJECTION, SOLUTION INTRAVENOUS; SUBCUTANEOUS
Qty: 30 ML | Refills: 1 | Status: SHIPPED | OUTPATIENT
Start: 2023-08-02 | End: 2023-12-01

## 2023-08-02 ASSESSMENT — PAIN SCALES - GENERAL: PAINLEVEL: EXTREME PAIN (8)

## 2023-08-02 NOTE — LETTER
My Asthma Action Plan    Name: Divina Delgadillo   YOB: 1985  Date: 8/2/2023   My doctor: FLO CARSON, DO   My clinic: [facility]        My Rescue Medicine:   Albuterol inhaler (Proair/Ventolin/Proventil HFA)  2-4 puffs EVERY 4 HOURS as needed. Use a spacer if recommended by your provider.   My Asthma Severity:   Mild Persistent  Know your asthma triggers: smoke and upper respiratory infections             GREEN ZONE   Good Control  I feel good  No cough or wheeze  Can work, sleep and play without asthma symptoms       Take your asthma control medicine every day.     If exercise triggers your asthma, take your rescue medication  15 minutes before exercise or sports, and  During exercise if you have asthma symptoms  Spacer to use with inhaler: If you have a spacer, make sure to use it with your inhaler             YELLOW ZONE Getting Worse  I have ANY of these:  I do not feel good  Cough or wheeze  Chest feels tight  Wake up at night   Keep taking your Green Zone medications  Start taking your rescue medicine:  every 20 minutes for up to 1 hour. Then every 4 hours for 24-48 hours.  If you stay in the Yellow Zone for more than 12-24 hours, contact your doctor.  If you do not return to the Green Zone in 12-24 hours or you get worse, start taking your oral steroid medicine if prescribed by your provider.           RED ZONE Medical Alert - Get Help  I have ANY of these:  I feel awful  Medicine is not helping  Breathing getting harder  Trouble walking or talking  Nose opens wide to breathe       Take your rescue medicine NOW  If your provider has prescribed an oral steroid medicine, start taking it NOW  Call your doctor NOW  If you are still in the Red Zone after 20 minutes and you have not reached your doctor:  Take your rescue medicine again and  Call 911 or go to the emergency room right away    See your regular doctor within 2 weeks of an Emergency Room or Urgent Care  visit for follow-up treatment.          Annual Reminders:  Meet with Asthma Educator,  Flu Shot in the Fall, consider Pneumonia Vaccination for patients with asthma (aged 19 and older).    Pharmacy:    MAIL ORDER - NO PHONE  [facility] [place], [place] [facility] [address] [facility] - [place], [place] - [address]  [facility] - [place], [place] - [address] [facility]    Electronically signed by FLO CARSON,    Date: 08/02/23                    Asthma Triggers  How To Control Things That Make Your Asthma Worse    Triggers are things that make your asthma worse.  Look at the list below to help you find your triggers and   what you can do about them. You can help prevent asthma flare-ups by staying away from your triggers.      Trigger                                                          What you can do   Cigarette Smoke  Tobacco smoke can make asthma worse. Do not allow smoking in your home, car or around you.  Be sure no one smokes at a child s day care or school.  If you smoke, ask your health care provider for ways to help you quit.  Ask family members to quit too.  Ask your health care provider for a referral to Quit Plan to help you quit smoking, or call 2-516-210-PLAN.     Colds, Flu, Bronchitis  These are common triggers of asthma. Wash your hands often.  Don t touch your eyes, nose or mouth.  Get a flu shot every year.     Dust Mites  These are tiny bugs that live in cloth or carpet. They are too small to see. Wash sheets and blankets in hot water every week.   Encase pillows and mattress in dust mite proof covers.  Avoid having carpet if you can. If you have carpet, vacuum weekly.   Use a dust mask and HEPA vacuum.   Pollen and Outdoor Mold  Some people are allergic to trees, grass, or weed pollen, or molds. Try to keep your windows closed.  Limit time out doors when pollen count is high.   Ask you health  care provider about taking medicine during allergy season.     Animal Dander  Some people are allergic to skin flakes, urine or saliva from pets with fur or feathers. Keep pets with fur or feathers out of your home.    If you can t keep the pet outdoors, then keep the pet out of your bedroom.  Keep the bedroom door closed.  Keep pets off cloth furniture and away from stuffed toys.     Mice, Rats, and Cockroaches  Some people are allergic to the waste from these pests.   Cover food and garbage.  Clean up spills and food crumbs.  Store grease in the refrigerator.   Keep food out of the bedroom.   Indoor Mold  This can be a trigger if your home has high moisture. Fix leaking faucets, pipes, or other sources of water.   Clean moldy surfaces.  Dehumidify basement if it is damp and smelly.   Smoke, Strong Odors, and Sprays  These can reduce air quality. Stay away from strong odors and sprays, such as perfume, powder, hair spray, paints, smoke incense, paint, cleaning products, candles and new carpet.   Exercise or Sports  Some people with asthma have this trigger. Be active!  Ask your doctor about taking medicine before sports or exercise to prevent symptoms.    Warm up for 5-10 minutes before and after sports or exercise.     Other Triggers of Asthma  Cold air:  Cover your nose and mouth with a scarf.  Sometimes laughing or crying can be a trigger.  Some medicines and food can trigger asthma.

## 2023-08-02 NOTE — PROGRESS NOTES
Assessment & Plan     Syncope, unspecified syncope type  Unclear etiology I have recommended blood work as below and meeting with cardiology.  I did want to obtain EKG in the office however patient declined this as she had to catch a bus  - Echocardiogram Complete  - Comprehensive metabolic panel (BMP + Alb, Alk Phos, ALT, AST, Total. Bili, TP)  - CBC with platelets and differential  - TSH with free T4 reflex  - Adult Cardiology Eval  Referral    Right foot pain  Significantly swollen, will plan to obtain blood work and repeat x-ray of foot and ankle.  If not improving would recommend meeting with orthopedics  - CBC with platelets and differential  - ESR: Erythrocyte sedimentation rate  - CRP, inflammation  - XR Foot Right G/E 3 Views  - XR Ankle Right G/E 3 Views  - Orthopedic  Referral    Localized edema  - TSH with free T4 reflex  - XR Foot Right G/E 3 Views      FLO CARSON DO  [facility]    Rhiannon Murcia is a 38 year old, presenting for the following health issues:  ER F/U        8/2/2023     3:14 PM   Additional Questions   Roomed by Juanita COOK MA   Accompanied by Self       HPI     ED/UC Followup:    Facility:  [facility]  Date of visit: 7/23/2023  Reason for visit: Ankle Injury, right; Knee Injury, Right  Current Status: States feels about the same.  Pain is mostly in her foot and rates it at 8/10.  Still having swelling and bruising with redness    Feels like pain is further down in the foot.    Reports this all happened after syncopal episode in her kitchen.  Reports she was passed out for about 40 minutes.  Denies chest pain, feeling dizzy, faint or palpitations prior to passing out.  Unsure why she did not tell ER provider about this.  Has never had this happen before      Review of Systems         Objective    /60 (BP Location: Right arm, Patient Position: Chair, Cuff Size: Adult Large)   Pulse 83   Temp 98.3  F (36.8  C)  "(Tympanic)   Resp 16   Ht 1.626 m (5' 4\")   Wt 102.1 kg (225 lb)   SpO2 98%   BMI 38.62 kg/m    Body mass index is 38.62 kg/m .  Physical Exam  Constitutional:       Appearance: Normal appearance.   Eyes:      Conjunctiva/sclera: Conjunctivae normal.   Cardiovascular:      Rate and Rhythm: Normal rate and regular rhythm.      Pulses: Normal pulses.   Pulmonary:      Effort: Pulmonary effort is normal.      Breath sounds: Normal breath sounds.   Abdominal:      General: Bowel sounds are normal.   Musculoskeletal:         General: Swelling present.      Comments: Swelling of right foot and ankle   Neurological:      General: No focal deficit present.      Mental Status: She is alert.   Psychiatric:         Thought Content: Thought content normal.         Judgment: Judgment normal.                  " patient

## 2023-08-02 NOTE — TELEPHONE ENCOUNTER
[facility] Call Center    Phone Message    May a detailed message be left on voicemail: yes     Reason for Call: Medication Question or concern regarding medication   Prescription Clarification  Name of Medication: Insulin Infusion Pump Supplies (MINIMED PUMP RESERVOIR 3ML) [facility]     Prescribing Provider: Dr. Beckman      Pharmacy: "[facility]      What on the order needs clarification? Does patient need the standard reservoir or the extended reservoir to match up with infusion set? If patient needs the extended one they will need a new order to be sent over specifically for that one.       Action Taken: Other: ENDO    Travel Screening: Not Applicable

## 2023-08-02 NOTE — TELEPHONE ENCOUNTER
Per 8/1/23 note:    will get patient set up with Medtronic 780G pump with guardian 4 sensor.     It looks like it's supposed to be the MiniMed 780G system through Medtronic.     Per note below, it looks like either the infusion set or reservoir needs to be changed so that they match.    Please advise.  Bianca Dias RN on 8/2/2023 at 1:54 PM

## 2023-08-02 NOTE — TELEPHONE ENCOUNTER
"Prescription approved per [facility] Refill Protocol.  Pending Prescriptions:                       Disp   Refills    Insulin Aspart FlexPen 100 UNIT/ML SOPN [*30 mL  1            Sig: Inject 1-14 Units Subcutaneous 3 times daily           (with meals) 3 times daily (with meals) (Use           Novlog on meal size small meal 15-30gm carb: 3           units, average meal 45-60gm carb: 5 units, large           meal 60gm+ carb: 7 units + pre meal correction           1:40>150. Max dose per 24 hours is 50 units)      Requested Prescriptions   Pending Prescriptions Disp Refills    Insulin Aspart FlexPen 100 UNIT/ML SOPN [Pharmacy Med Name: insulin aspart (U-100) 100 unit/mL (3 mL) subcutaneous pen] 30 mL 1     Sig: Inject 1-14 Units Subcutaneous 3 times daily (with meals) 3 times daily (with meals) (Use Novlog on meal size small meal 15-30gm carb: 3 units, average meal 45-60gm carb: 5 units, large meal 60gm+ carb: 7 units + pre meal correction 1:40>150. Max dose per 24 hours is 50 units)       Short Acting Insulin Protocol Passed - 8/1/2023  8:02 AM        Passed - Serum creatinine on file in past 12 months     Recent Labs   Lab Test 07/13/23  1419 09/19/17  1503 09/01/17  1049   CR 1.70*   < >  --    CREAT  --   --  1.6*    < > = values in this interval not displayed.       Ok to refill medication if creatinine is low          Passed - HgbA1C in past 3 or 6 months     If HgbA1C is 8 or greater, it needs to be on file within the past 3 months.  If less than 8, must be on file within the past 6 months.     Recent Labs   Lab Test 07/13/23  1419   A1C 8.3*             Passed - Medication is active on med list        Passed - Patient is age 18 or older        Passed - Recent (6 mo) or future (30 days) visit within the authorizing provider's specialty     Patient had office visit in the last 6 months or has a visit in the next 30 days with authorizing provider or within the authorizing provider's specialty.  See \"Patient Info\" tab " "in inbasket, or \"Choose Columns\" in Meds & Orders section of the refill encounter.               Danelle Swann RN on 8/2/2023 at 11:40 AM    "

## 2023-08-02 NOTE — LETTER
August 2, 2023      Divina Delgadillo  [address] [facility] [address] [facility] [address]        To Whom It May Concern:    Divina Delgadillo  was seen on 8/2/23.  Please excuse her  until 8/16/23 due to injury.        Sincerely,        FLO CARSON, DO

## 2023-08-03 ENCOUNTER — ANESTHESIA EVENT (OUTPATIENT)
Dept: MRI IMAGING | Facility: HOSPITAL | Age: 38
End: 2023-08-03
Payer: COMMERCIAL

## 2023-08-03 ENCOUNTER — MEDICAL CORRESPONDENCE (OUTPATIENT)
Dept: HEALTH INFORMATION MANAGEMENT | Facility: CLINIC | Age: 38
End: 2023-08-03

## 2023-08-03 LAB
ALBUMIN SERPL BCG-MCNC: 3.9 G/DL (ref 3.5–5.2)
ALP SERPL-CCNC: 197 U/L (ref 35–104)
ALT SERPL W P-5'-P-CCNC: 68 U/L (ref 0–50)
ANION GAP SERPL CALCULATED.3IONS-SCNC: 14 MMOL/L (ref 7–15)
AST SERPL W P-5'-P-CCNC: 33 U/L (ref 0–45)
BILIRUB SERPL-MCNC: 0.2 MG/DL
BUN SERPL-MCNC: 30.3 MG/DL (ref 6–20)
CALCIUM SERPL-MCNC: 9.9 MG/DL (ref 8.6–10)
CHLORIDE SERPL-SCNC: 109 MMOL/L (ref 98–107)
CREAT SERPL-MCNC: 1.77 MG/DL (ref 0.51–0.95)
CRP SERPL-MCNC: 14.74 MG/L
DEPRECATED HCO3 PLAS-SCNC: 16 MMOL/L (ref 22–29)
GFR SERPL CREATININE-BSD FRML MDRD: 37 ML/MIN/1.73M2
GLUCOSE SERPL-MCNC: 277 MG/DL (ref 70–99)
POTASSIUM SERPL-SCNC: 5.8 MMOL/L (ref 3.4–5.3)
PROT SERPL-MCNC: 7.7 G/DL (ref 6.4–8.3)
SODIUM SERPL-SCNC: 139 MMOL/L (ref 136–145)
TSH SERPL DL<=0.005 MIU/L-ACNC: 0.84 UIU/ML (ref 0.3–4.2)

## 2023-08-04 ENCOUNTER — ANESTHESIA (OUTPATIENT)
Dept: MRI IMAGING | Facility: HOSPITAL | Age: 38
End: 2023-08-04
Payer: COMMERCIAL

## 2023-08-04 DIAGNOSIS — M79.671 RIGHT FOOT PAIN: ICD-10-CM

## 2023-08-04 DIAGNOSIS — N18.30 STAGE 3 CHRONIC KIDNEY DISEASE (H): Primary | Chronic | ICD-10-CM

## 2023-08-04 DIAGNOSIS — N18.32 STAGE 3B CHRONIC KIDNEY DISEASE (H): Primary | ICD-10-CM

## 2023-08-04 DIAGNOSIS — E11.65 UNCONTROLLED TYPE 2 DIABETES MELLITUS WITH HYPERGLYCEMIA (H): ICD-10-CM

## 2023-08-04 DIAGNOSIS — E87.5 HYPERKALEMIA: Primary | ICD-10-CM

## 2023-08-04 NOTE — ANESTHESIA PREPROCEDURE EVALUATION
Anesthesia Pre-Procedure Evaluation    Patient: Divina Delgadillo   MRN: [id number] : 1985        Procedure : * No procedures listed *  MR Complete Spine WO Contrast Combo       Past Medical History:   Diagnosis Date    Acquired asplenia     Acute pain of left knee 2019    Acute-on-chronic kidney injury (H) 2019    ARF (acute renal failure) (H) 2019    Asthma     Bipolar disorder (H)     Cardiac murmur     Cervical high risk HPV (human papillomavirus) test positive 2017    Chiari malformation type I (H)     Chronic bilateral low back pain without sciatica     Deep venous thrombosis of arm (H) 2017    ultrasound 2017-  venous thrombus in the antecubital fossa region and the left forearm as described    Depressive disorder     DVT (deep venous thrombosis) (H)     DVT of upper extremity (deep vein thrombosis) (H)     Esophageal reflux     GERD    Hypertension     Insomnia     Leukocytosis     Mild intermittent asthma     Morbid obesity (H)     Mucinous neoplasm of pancreas     NAFL (nonalcoholic fatty liver)     Neck pain     Nicotine abuse     Other specified types of schizophrenia, unspecified condition     Schizoaffective disorder, depressive type (H)     Stage 3a chronic kidney disease (CKD) (H)     Type 2 diabetes mellitus (H)     Ulnar neuropathy of both upper extremities     Vitamin D insufficiency       Past Surgical History:   Procedure Laterality Date    ADRENALECTOMY Left 2015    BIOPSY      BREAST SURGERY  2013    COLONOSCOPY      ENT SURGERY  10/01/2000    Tympanoplasty    IR LIVER BIOPSY PERCUTANEOUS  2019    PANCREATECTOMY PARTIAL  2015    PERCUTANEOUS BIOPSY LIVER Right 2019    Procedure: Percutaneous Liver Biopsy;  Surgeon: Sean Guzman PA-C;  Location: [facility] OR    SPLENECTOMY      TONSILLECTOMY  10/01/2000    Tonsillectomy      Allergies   Allergen Reactions    Acetaminophen Other (See Comments)     pt previously tried to  overdose on it, PMD does not want pt taking per pt.     Latex Rash      Social History     Tobacco Use    Smoking status: Former     Types: Vaping Device, Cigarettes     Quit date: 2023     Years since quittin.4     Passive exposure: Yes    Smokeless tobacco: Never    Tobacco comments:     A pack every 3 days   Substance Use Topics    Alcohol use: No      Wt Readings from Last 1 Encounters:   23 101 kg (222 lb 11.2 oz)        Anesthesia Evaluation   Pt has had prior anesthetic.         ROS/MED HX  ENT/Pulmonary:     (+)                    Intermittent, asthma               Tobacco use: vape.   Neurologic: Comment: Arnold chiari type I  Ulnar neuropathy  PTSD        Cardiovascular:     (+)  hypertension- -   -  - -                                      METS/Exercise Tolerance: >4 METS    Hematologic:  - neg hematologic  ROS     Musculoskeletal:  - neg musculoskeletal ROS     GI/Hepatic: Comment: SHANI - neg GI/hepatic ROS   (+) GERD, Asymptomatic on medication,           liver disease,       Renal/Genitourinary:  - neg Renal ROS   (+) renal disease (stage 3), type: CRI,            Endo:  - neg endo ROS   (+)  type II DM,             Obesity,       Psychiatric/Substance Use: Comment: Schizoaffective disorder    (+) psychiatric history anxiety, depression and bipolar       Infectious Disease:  - neg infectious disease ROS     Malignancy:  - neg malignancy ROS     Other:  - neg other ROS          Physical Exam    Airway  airway exam normal      Mallampati: II       Respiratory Devices and Support         Dental  no notable dental history     (+) Removable bridges or other hardware      Cardiovascular   cardiovascular exam normal       Rhythm and rate: regular and normal     Pulmonary   pulmonary exam normal        breath sounds clear to auscultation           OUTSIDE LABS:  CBC:   Lab Results   Component Value Date    WBC 15.0 (H) 2023    WBC 13.3 (H) 2023    HGB 11.2 (L) 2023    HGB 11.8  07/31/2023    HCT 36.3 08/02/2023    HCT 37.8 07/31/2023     08/02/2023     07/31/2023     BMP:   Lab Results   Component Value Date     08/02/2023     07/13/2023    POTASSIUM 5.8 (H) 08/02/2023    POTASSIUM 4.5 07/13/2023    CHLORIDE 109 (H) 08/02/2023    CHLORIDE 112 (H) 07/13/2023    CO2 16 (L) 08/02/2023    CO2 19 (L) 07/13/2023    BUN 30.3 (H) 08/02/2023    BUN 34.5 (H) 07/13/2023    CR 1.77 (H) 08/02/2023    CR 1.70 (H) 07/13/2023     (H) 08/04/2023     (H) 08/02/2023     COAGS:   Lab Results   Component Value Date    PTT 24 08/09/2006    INR 0.87 10/19/2022     POC:   Lab Results   Component Value Date     (H) 05/29/2020    HCG Negative 01/11/2023    HCGS Negative 05/25/2020     HEPATIC:   Lab Results   Component Value Date    ALBUMIN 3.9 08/02/2023    PROTTOTAL 7.7 08/02/2023    ALT 68 (H) 08/02/2023    AST 33 08/02/2023     (H) 04/22/2009    ALKPHOS 197 (H) 08/02/2023    BILITOTAL 0.2 08/02/2023     OTHER:   Lab Results   Component Value Date    A1C 8.3 (H) 07/13/2023    CLAY 9.9 08/02/2023    PHOS 3.6 12/19/2022    MAG 1.9 11/29/2007    LIPASE 70 (H) 05/23/2023    AMYLASE 35 01/05/2006    TSH 0.84 08/02/2023    CRP 5.4 08/01/2018    SED 55 (H) 08/02/2023       Anesthesia Plan    ASA Status:  3       Anesthesia Type: General.     - Airway: ETT   Induction: Intravenous, Propofol.   Maintenance: Balanced.   Techniques and Equipment:     - Airway: Video-Laryngoscope       Consents    Anesthesia Plan(s) and associated risks, benefits, and realistic alternatives discussed. Questions answered and patient/representative(s) expressed understanding.     - Discussed:     - Discussed with:  Patient, Legal Guardian      - Extended Intubation/Ventilatory Support Discussed: No.      - Patient is DNR/DNI Status: No     Use of blood products discussed: No .     Postoperative Care    Pain management: IV analgesics.   PONV prophylaxis: Ondansetron (or other 5HT-3)      Comments:    Other Comments: GAETT  glidescope  Antiemetics/No decadron  Consider background propofol  No ketamine (psych history)  Recheck potassium pre op  Head should stay in neutral position given possible symptoms related to ACM I    Note case was cancelled in the pre op for K=5.5-5.8 with documented progressive increased Cr over the last 6 months (1.2 now increased to 1.77). Protracted GA case scheduled with contrast. Discussed case with primary provider who agrees that additional workup is required prior to elective case and administration of contrast. Primary care alerted and will orchestrate.               Bonilla Rojas MD

## 2023-08-08 ENCOUNTER — MYC MEDICAL ADVICE (OUTPATIENT)
Dept: FAMILY MEDICINE | Facility: CLINIC | Age: 38
End: 2023-08-08
Payer: COMMERCIAL

## 2023-08-08 DIAGNOSIS — E11.65 UNCONTROLLED TYPE 2 DIABETES MELLITUS WITH HYPERGLYCEMIA (H): Primary | ICD-10-CM

## 2023-08-09 LAB — C PEPTIDE SERPL-MCNC: 8.2 NG/ML (ref 0.9–6.9)

## 2023-08-09 NOTE — TELEPHONE ENCOUNTER
Patient was seen on 8/02 for syncope, she needs to schedule follow up appointment, can use same day    VERONIQUE Spears CNP

## 2023-08-10 ENCOUNTER — OFFICE VISIT (OUTPATIENT)
Dept: PODIATRY | Facility: CLINIC | Age: 38
End: 2023-08-10
Payer: COMMERCIAL

## 2023-08-10 ENCOUNTER — MYC MEDICAL ADVICE (OUTPATIENT)
Dept: PODIATRY | Facility: CLINIC | Age: 38
End: 2023-08-10

## 2023-08-10 ENCOUNTER — MEDICAL CORRESPONDENCE (OUTPATIENT)
Dept: HEALTH INFORMATION MANAGEMENT | Facility: CLINIC | Age: 38
End: 2023-08-10

## 2023-08-10 VITALS
SYSTOLIC BLOOD PRESSURE: 110 MMHG | HEIGHT: 64 IN | DIASTOLIC BLOOD PRESSURE: 64 MMHG | BODY MASS INDEX: 38.01 KG/M2 | WEIGHT: 222.66 LBS

## 2023-08-10 DIAGNOSIS — S93.401A MODERATE ANKLE SPRAIN, RIGHT, INITIAL ENCOUNTER: ICD-10-CM

## 2023-08-10 DIAGNOSIS — E11.43 TYPE II DIABETES MELLITUS WITH PERIPHERAL AUTONOMIC NEUROPATHY (H): Primary | ICD-10-CM

## 2023-08-10 PROCEDURE — 99213 OFFICE O/P EST LOW 20 MIN: CPT | Performed by: PODIATRIST

## 2023-08-10 NOTE — LETTER
August 10, 2023      Divina Delgadillo  [address] [address] [facility] [address]        To Whom It May Concern:    Divina Delgadillo was seen in our clinic. She may return to work with the following: limited to light duty - lifting no greater than 20 pounds and must be able to take a break for 5 mins every 1-2 hrs to move position on or about Wednesday, August 16 through September 16.      Sincerely,        Raul Douglas DPM

## 2023-08-10 NOTE — TELEPHONE ENCOUNTER
Pt has appt scheduled with Dr. Douglas today and possibly get some restrictions. Pt will call us back if needing further instructions.  Lenora [facility]

## 2023-08-10 NOTE — Clinical Note
8/10/2023         RE: Divina Delgadillo  [address] [address] [facility] [address]        Dear Colleague,    Thank you for referring your patient, Divina Delgadillo, to the [facility] [place]. Please see a copy of my visit note below.    PATIENT HISTORY:  Divina Delgadillo is a 38 year old female who presents to clinic for a diabetic foot evaluation. It's getting better, but the foot it sore with a little swelling on the lateral aspect. Having trouble sleeping due to pain in the foot. The ingrown toenail has gotten better. The patient relates some numbness to the feet.  The patient denies any swelling or open sores on both feet.  The patient relates blood sugars are within normal limits.  Has been wearing normal shoe for about 4 days. 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       REVIEW OF SYSTEMS:  Constitutional, HEENT, cardiovascular, pulmonary, GI, , musculoskeletal, neuro, skin, endocrine and psych systems are negative, except as otherwise noted.     PAST MEDICAL HISTORY:   Past Medical History:   Diagnosis Date    Acquired asplenia     Acute pain of left knee 03/22/2019    Acute-on-chronic kidney injury (H) 03/22/2019    ARF (acute renal failure) (H) 03/23/2019    Asthma     Bipolar disorder (H)     Cardiac murmur     Cervical high risk HPV (human papillomavirus) test positive 06/20/2017    Chiari malformation type I (H)     Chronic bilateral low back pain without sciatica     Deep venous thrombosis of arm (H) 01/06/2017    ultrasound 1/6/2017-  venous thrombus in the antecubital fossa region and the left forearm as described    Depressive disorder     DVT (deep venous thrombosis) (H)     DVT of upper extremity (deep vein thrombosis) (H) 2021    Esophageal reflux     GERD    Hypertension     Insomnia     Leukocytosis     Mild intermittent asthma     Morbid obesity (H)     Mucinous neoplasm of pancreas     NAFL (nonalcoholic  fatty liver)     Neck pain     Nicotine abuse     Other specified types of schizophrenia, unspecified condition     Schizoaffective disorder, depressive type (H)     Stage 3a chronic kidney disease (CKD) (H)     Type 2 diabetes mellitus (H)     Ulnar neuropathy of both upper extremities     Vitamin D insufficiency         PAST SURGICAL HISTORY:   Past Surgical History:   Procedure Laterality Date    ADRENALECTOMY Left 2015    BIOPSY      BREAST SURGERY  2013    COLONOSCOPY      ENT SURGERY  10/01/2000    Tympanoplasty    IR LIVER BIOPSY PERCUTANEOUS  11/14/2019    PANCREATECTOMY PARTIAL  2015    PERCUTANEOUS BIOPSY LIVER Right 11/14/2019    Procedure: Percutaneous Liver Biopsy;  Surgeon: Sean Guzman PA-C;  Location: [facility] OR    SPLENECTOMY  2015    TONSILLECTOMY  10/01/2000    Tonsillectomy        MEDICATIONS:   Current Outpatient Medications:     ACCU-CHEK GUIDE test strip, Use to test blood sugar 3 times daily or as directed., Disp: 150 strip, Rfl: 1    acetaminophen (TYLENOL) 325 MG tablet, Take 325-650 mg by mouth 2 tab every 4-6 hours as needed, do not exceed 9 tabs in 24hours (Patient not taking: Reported on 8/9/2023), Disp: , Rfl:     albuterol (VENTOLIN HFA) 108 (90 Base) MCG/ACT inhaler, INHALE 2 PUFFS INTO THE LUNGS EVERY SIX  HOURS AS NEEDED FOR SHORTNESS OF BREATH, Disp: 18 g, Rfl: 10    ARIPiprazole (ABILIFY) 30 MG tablet, Take 30 mg by mouth daily, Disp: , Rfl:     atorvastatin (LIPITOR) 10 MG tablet, Take 1 tablet (10 mg) by mouth daily, Disp: 90 tablet, Rfl: 2    Biotin 5 MG TABS, Take 1 tablet by mouth daily, Disp: 30 tablet, Rfl: 5    Biotin 5000 MCG CAPS, Take 1 tablet by mouth daily at 2 pm, Disp: , Rfl:     blood glucose (ONETOUCH VERIO IQ) test strip, Use to test blood sugar up to 3 times daily, Disp: 100 strip, Rfl: 11    Blood Glucose Monitoring Suppl (ONETOUCH VERIO FLEX SYSTEM) w/Device KIT, 1 each daily, Disp: 1 kit, Rfl: 0    carvedilol (COREG) 12.5 MG tablet, Take 1 tablet  (12.5 mg) by mouth 2 times daily, Disp: 180 tablet, Rfl: 3    cloZAPine (CLOZARIL) 100 MG tablet, Take 50 mg by mouth 2 times daily, Disp: , Rfl:     COMPOUNDED NON-CONTROLLED SUBSTANCE (CMPD RX) - PHARMACY TO MIX COMPOUNDED MEDICATION, Chloramphenicol 50 mg, sulfamethoxazole 50 mg, amphotericin B 5 mg, hydrocortisone 1 mg. 2-3 puffs to affected ear PRN itching/drainage (Patient not taking: Reported on 7/13/2023), Disp: 10 capsule, Rfl: 1    Continuous Blood Gluc Sensor (DEXCOM G6 SENSOR) MISC, 1 each every 10 days. Change every 10 days. USE TO READ BLOOD SUGARS PER  INSTRUCTIONS, Disp: 12 each, Rfl: 1    Continuous Blood Gluc Sensor (GUARDIAN 4 GLUCOSE SENSOR) MISC, 1 each every 7 days Change sensor every 7 days (Patient not taking: Reported on 8/9/2023), Disp: 15 each, Rfl: 4    Continuous Blood Gluc Transmit (DEXCOM G6 TRANSMITTER) MISC, 1 each every 3 months Change every 3 months. USE TO READ BLOOD SUGARS PER  INSTRUCTIONS, Disp: 1 each, Rfl: 1    Continuous Blood Gluc Transmit (GUARDIAN 4 TRANSMITTER) MISC, 1 each every 7 days Change sensor every 7 days (Patient not taking: Reported on 8/9/2023), Disp: 12 each, Rfl: 4    fluocinolone acetonide 0.01 % OIL, Place 4 drops in ear(s) 2 times daily as needed (ear itching) (Patient not taking: Reported on 7/13/2023), Disp: 20 mL, Rfl: 3    FLUoxetine (PROZAC) 40 MG capsule, Take 40 mg by mouth daily, Disp: , Rfl:     glucose (BD GLUCOSE) 4 g chewable tablet, Take 1 tablet by mouth every hour as needed for low blood sugar, Disp: 30 tablet, Rfl: 4    hydrOXYzine (ATARAX) 25 MG tablet, Take 1 Tablet (25 mg) by mouth 3 times daily if needed for Anxiety., Disp: , Rfl:     insulin aspart (NOVOLOG VIAL) 100 UNITS/ML vial, For use with continuous insulin pump.  Max TDD 60., Disp: 60 mL, Rfl: 4    Insulin Aspart FlexPen 100 UNIT/ML SOPN, Inject 1-14 Units Subcutaneous 3 times daily (with meals) 3 times daily (with meals) (Use Novlog on meal size small  "meal 15-30gm carb: 3 units, average meal 45-60gm carb: 5 units, large meal 60gm+ carb: 7 units + pre meal correction 1:40>150. Max dose per 24 hours is 50 units), Disp: 30 mL, Rfl: 1    insulin glargine (LANTUS SOLOSTAR) 100 UNIT/ML pen, Inject 34 Units Subcutaneous every morning, Disp: 45 mL, Rfl: 1    Insulin Infusion Pump (MINIMED 780G INSULIN PUMP) KIT, 1 each daily SmartGuard Target: 100; Active Insulin Time: 2 hours (recommended); SmartGuardTM Warmup/Manual Mode: Suspend before low (recommended), Disp: 1 kit, Rfl: 0    Insulin Infusion Pump Supplies (EXTENDED INFUSION SET 23\"/6MM) MISC, 1 each every 7 days Max Total Daily Dose 70 units; Change infusion set & reservoir every 7 days (recommended), Disp: 10 each, Rfl: 6    Insulin Infusion Pump Supplies (EXTENDED RESERVOIR 3ML) MISC, 1 each every 7 days, Disp: 10 each, Rfl: 11    insulin pen needle (32G X 6 MM) 32G X 6 MM miscellaneous, Use 4 pen needles daily., Disp: 150 each, Rfl: 10    insulin pen needle (ULTICARE MINI) 31G X 6 MM miscellaneous, USE 1 PEN NEEDLE 4 times daily, Disp: 200 each, Rfl: 10    lamoTRIgine (LAMICTAL) 100 MG tablet, Take 100 mg by mouth At Bedtime, Disp: , Rfl:     lisinopril (ZESTRIL) 5 MG tablet, Take 1 tablet (5 mg) by mouth daily, Disp: 90 tablet, Rfl: 3    loperamide (IMODIUM A-D) 2 MG tablet, 2 caplets after loose stool then 1 after each loose stool do not exceed 4 in 24hrs, Disp: , Rfl:     loratadine (CLARITIN) 10 MG tablet, Take 1 tablet (10 mg) by mouth every morning, Disp: 28 tablet, Rfl: 10    omeprazole (PRILOSEC) 20 MG DR capsule, TAKE 1 CAPSULE BY MOUTH EVERY DAY, Disp: 28 capsule, Rfl: 2    OneTouch Delica Lancets 33G MISC, 1 each 4 times daily, Disp: 100 each, Rfl: 11    Pyridoxine HCl (VITAMIN B6) 100 MG TABS, Take 100 mg by mouth daily (Patient not taking: Reported on 8/9/2023), Disp: , Rfl:     QUEtiapine (SEROQUEL) 25 MG tablet, Take 25 mg by mouth 1 tablet 4 times daily as needed for agitation or hallucinations, " Disp: , Rfl:     topiramate (TOPAMAX) 50 MG tablet, Take 1 tablet (50 mg) by mouth 2 times daily, Disp: 60 tablet, Rfl: 3    traZODone (DESYREL) 50 MG tablet, Take 1-2 Tablets ( mg) by mouth at bedtime if needed for Sleep., Disp: , Rfl:      ALLERGIES:    Allergies   Allergen Reactions    Acetaminophen Other (See Comments)     pt previously tried to overdose on it, PMD does not want pt taking per pt.     Latex Rash        SOCIAL HISTORY:   Social History     Socioeconomic History    Marital status: Single     Spouse name: Not on file    Number of children: 0    Years of education: Not on file    Highest education level: Not on file   Occupational History    Not on file   Tobacco Use    Smoking status: Former     Types: Vaping Device, Cigarettes     Quit date: 2023     Years since quittin.4     Passive exposure: Yes    Smokeless tobacco: Never    Tobacco comments:     A pack every 3 days   Vaping Use    Vaping Use: Every day    Substances: Nicotine, Flavoring    Devices: Disposable   Substance and Sexual Activity    Alcohol use: No    Drug use: No    Sexual activity: Yes     Partners: Female     Birth control/protection: I.U.D.     Comment: Both male and female    Other Topics Concern    Parent/sibling w/ CABG, MI or angioplasty before 65F 55M? No   Social History Narrative    Not on file     Social Determinants of Health     Financial Resource Strain: Not on file   Food Insecurity: Not on file   Transportation Needs: Not on file   Physical Activity: Not on file   Stress: Not on file   Social Connections: Not on file   Intimate Partner Violence: Not on file   Housing Stability: Not on file        FAMILY HISTORY:   Family History   Problem Relation Age of Onset    Respiratory Mother         ASTHMA    Allergies Mother     Depression Mother     Chronic Obstructive Pulmonary Disease Mother     Anxiety Disorder Mother     Mental Illness Mother     Asthma Mother     Heart Disease Father         HEART VALVE  REPLACEMENT    Hypertension Father     Diabetes Father     Depression Father     Anxiety Disorder Father     Mental Illness Father     Obesity Father     Respiratory Sister     Allergies Sister     Depression Sister     Respiratory Sister     Allergies Sister     Depression Sister     Respiratory Brother     Allergies Brother     Kidney Disease No family hx of         Vitals: There were no vitals taken for this visit.       Lower Extremity Evaluation:     Dermatologic: Skin is intact to both lower extremities without significant lesions, rash or abrasion.  No paronychia or evidence of soft tissue infection is noted.  The nails are hypertrophic and discolored bilateral feet.     Vascular: DP & PT pulses are intact & regular bilaterally.   Capillary filling and skin temperature is normal to both lower extremities.  There are no varicosities, no edema and no trophic changes noted.      Neurologic:   No evidence of weakness in the lower extremities.  Noted evidence of neuropathy with diminished sensation bilaterally.       Musculoskeletal: Patient is ambulatory without assistive device or brace.  No gross ankle deformity noted.  No foot or ankle joint effusion is noted.  One notes hammertoe contracture of the lesser toes bilaterally.    Labs:      Assessment:        ICD-10-CM    1. Type II diabetes mellitus with peripheral autonomic neuropathy (H)  E11.43               Plan:  I have explained to the patient the underlying condition affecting the feet.  At this point, ***.  The patient was given information on local certified foot care nursing services that can provide routine nail care.    There is low risk of morbidity with the procedure.  There was no overlap in work associated with the evaluation/management and the work associated with the procedure.    I have discussed with the patient the importance of diabetic foot care and daily inspection of the feet.  The patient was instructed to come in anytime if there is  any concern about the feet with redness, swelling or drainage is noted.    Divina verbalized agreement with and understanding of the rational for the diagnosis and treatment plan.  All questions were answered to best of my ability and the patient's satisfaction. The patient was advised to contact the clinic with any questions that may arise after the clinic visit.      Disclaimer: This note consists of symbols derived from keyboarding, dictation and/or voice recognition software. As a result, there may be errors in the script that have gone undetected. Please consider this when interpreting information found in this chart.        ERNA Douglas D.P.M., F.A.C.F.A.S.            Again, thank you for allowing me to participate in the care of your patient.        Sincerely,        Raul Douglas DPM

## 2023-08-10 NOTE — PATIENT INSTRUCTIONS

## 2023-08-10 NOTE — PROGRESS NOTES
PATIENT HISTORY:  Divina Delgadillo is a 38 year old female who presents to clinic for a diabetic foot evaluation. The patient relates that it's getting better, but the foot is sore with a little swelling on the lateral aspect.  She relates having trouble sleeping due to pain in the foot.  The patient relates that the ingrown toenail has gotten better. The patient relates some numbness to the feet.  The patient denies any swelling or open sores on both feet.  The patient relates blood sugars are within normal limits.  Has been wearing normal shoe for about 4 days. 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       REVIEW OF SYSTEMS:  Constitutional, HEENT, cardiovascular, pulmonary, GI, , musculoskeletal, neuro, skin, endocrine and psych systems are negative, except as otherwise noted.     PAST MEDICAL HISTORY:   Past Medical History:   Diagnosis Date    Acquired asplenia     Acute pain of left knee 03/22/2019    Acute-on-chronic kidney injury (H) 03/22/2019    ARF (acute renal failure) (H) 03/23/2019    Asthma     Bipolar disorder (H)     Cardiac murmur     Cervical high risk HPV (human papillomavirus) test positive 06/20/2017    Chiari malformation type I (H)     Chronic bilateral low back pain without sciatica     Deep venous thrombosis of arm (H) 01/06/2017    ultrasound 1/6/2017-  venous thrombus in the antecubital fossa region and the left forearm as described    Depressive disorder     DVT (deep venous thrombosis) (H)     DVT of upper extremity (deep vein thrombosis) (H) 2021    Esophageal reflux     GERD    Hypertension     Insomnia     Leukocytosis     Mild intermittent asthma     Morbid obesity (H)     Mucinous neoplasm of pancreas     NAFL (nonalcoholic fatty liver)     Neck pain     Nicotine abuse     Other specified types of schizophrenia, unspecified condition     Schizoaffective disorder, depressive type (H)     Stage 3a chronic kidney disease  (CKD) (H)     Type 2 diabetes mellitus (H)     Ulnar neuropathy of both upper extremities     Vitamin D insufficiency         PAST SURGICAL HISTORY:   Past Surgical History:   Procedure Laterality Date    ADRENALECTOMY Left 2015    BIOPSY      BREAST SURGERY  2013    COLONOSCOPY      ENT SURGERY  10/01/2000    Tympanoplasty    IR LIVER BIOPSY PERCUTANEOUS  11/14/2019    PANCREATECTOMY PARTIAL  2015    PERCUTANEOUS BIOPSY LIVER Right 11/14/2019    Procedure: Percutaneous Liver Biopsy;  Surgeon: Sean Guzman PA-C;  Location: [facility] OR    SPLENECTOMY  2015    TONSILLECTOMY  10/01/2000    Tonsillectomy        MEDICATIONS:   Current Outpatient Medications:     ACCU-CHEK GUIDE test strip, Use to test blood sugar 3 times daily or as directed., Disp: 150 strip, Rfl: 1    acetaminophen (TYLENOL) 325 MG tablet, Take 325-650 mg by mouth 2 tab every 4-6 hours as needed, do not exceed 9 tabs in 24hours (Patient not taking: Reported on 8/9/2023), Disp: , Rfl:     albuterol (VENTOLIN HFA) 108 (90 Base) MCG/ACT inhaler, INHALE 2 PUFFS INTO THE LUNGS EVERY SIX  HOURS AS NEEDED FOR SHORTNESS OF BREATH, Disp: 18 g, Rfl: 10    ARIPiprazole (ABILIFY) 30 MG tablet, Take 30 mg by mouth daily, Disp: , Rfl:     atorvastatin (LIPITOR) 10 MG tablet, Take 1 tablet (10 mg) by mouth daily, Disp: 90 tablet, Rfl: 2    Biotin 5 MG TABS, Take 1 tablet by mouth daily, Disp: 30 tablet, Rfl: 5    Biotin 5000 MCG CAPS, Take 1 tablet by mouth daily at 2 pm (Patient not taking: Reported on 8/22/2023), Disp: , Rfl:     blood glucose (ONETOUCH VERIO IQ) test strip, Use to test blood sugar up to 3 times daily, Disp: 100 strip, Rfl: 11    Blood Glucose Monitoring Suppl (ONETOUCH VERIO FLEX SYSTEM) w/Device KIT, 1 each daily, Disp: 1 kit, Rfl: 0    carvedilol (COREG) 12.5 MG tablet, Take 1 tablet (12.5 mg) by mouth 2 times daily, Disp: 180 tablet, Rfl: 3    cloZAPine (CLOZARIL) 100 MG tablet, Take 50 mg by mouth 2 times daily, Disp: , Rfl:      COMPOUNDED NON-CONTROLLED SUBSTANCE (CMPD RX) - PHARMACY TO MIX COMPOUNDED MEDICATION, Chloramphenicol 50 mg, sulfamethoxazole 50 mg, amphotericin B 5 mg, hydrocortisone 1 mg. 2-3 puffs to affected ear PRN itching/drainage (Patient not taking: Reported on 7/13/2023), Disp: 10 capsule, Rfl: 1    Continuous Blood Gluc Sensor (DEXCOM G6 SENSOR) MISC, 1 each every 10 days. Change every 10 days. USE TO READ BLOOD SUGARS PER  INSTRUCTIONS, Disp: 12 each, Rfl: 1    Continuous Blood Gluc Sensor (GUARDIAN 4 GLUCOSE SENSOR) MISC, 1 each every 7 days Change sensor every 7 days, Disp: 15 each, Rfl: 4    Continuous Blood Gluc Transmit (DEXCOM G6 TRANSMITTER) MISC, 1 each every 3 months Change every 3 months. USE TO READ BLOOD SUGARS PER  INSTRUCTIONS, Disp: 1 each, Rfl: 1    Continuous Blood Gluc Transmit (GUARDIAN 4 TRANSMITTER) MISC, 1 each every 7 days Change sensor every 7 days (Patient not taking: Reported on 8/9/2023), Disp: 12 each, Rfl: 4    fluocinolone acetonide 0.01 % OIL, Place 4 drops in ear(s) 2 times daily as needed (ear itching) (Patient not taking: Reported on 7/13/2023), Disp: 20 mL, Rfl: 3    FLUoxetine (PROZAC) 40 MG capsule, Take 40 mg by mouth daily, Disp: , Rfl:     glucose (BD GLUCOSE) 4 g chewable tablet, Take 1 tablet by mouth every hour as needed for low blood sugar (Patient not taking: Reported on 8/31/2023), Disp: 30 tablet, Rfl: 4    hydrOXYzine (ATARAX) 25 MG tablet, Take 1 Tablet (25 mg) by mouth 3 times daily if needed for Anxiety., Disp: , Rfl:     insulin aspart (NOVOLOG VIAL) 100 UNITS/ML vial, For use with continuous insulin pump.  Max TDD 60., Disp: 60 mL, Rfl: 4    Insulin Aspart FlexPen 100 UNIT/ML SOPN, Inject 1-14 Units Subcutaneous 3 times daily (with meals) 3 times daily (with meals) (Use Novlog on meal size small meal 15-30gm carb: 3 units, average meal 45-60gm carb: 5 units, large meal 60gm+ carb: 7 units + pre meal correction 1:40>150. Max dose per 24 hours  "is 50 units), Disp: 30 mL, Rfl: 1    insulin glargine (LANTUS SOLOSTAR) 100 UNIT/ML pen, Inject 32 Units Subcutaneous every morning, Disp: 45 mL, Rfl: 1    Insulin Infusion Pump (MINIMED 780G INSULIN PUMP) KIT, 1 each daily SmartGuard Target: 100; Active Insulin Time: 2 hours (recommended); SmartGuardTM Warmup/Manual Mode: Suspend before low (recommended) (Patient not taking: Reported on 8/22/2023), Disp: 1 kit, Rfl: 0    Insulin Infusion Pump Supplies (EXTENDED INFUSION SET 23\"/6MM) MISC, 1 each every 7 days Max Total Daily Dose 70 units; Change infusion set & reservoir every 7 days (recommended) (Patient not taking: Reported on 8/22/2023), Disp: 10 each, Rfl: 6    Insulin Infusion Pump Supplies (EXTENDED RESERVOIR 3ML) MISC, 1 each every 7 days (Patient not taking: Reported on 8/22/2023), Disp: 10 each, Rfl: 11    insulin pen needle (32G X 6 MM) 32G X 6 MM miscellaneous, Use 4 pen needles daily., Disp: 150 each, Rfl: 10    insulin pen needle (ULTICARE MINI) 31G X 6 MM miscellaneous, USE 1 PEN NEEDLE 4 times daily, Disp: 200 each, Rfl: 10    lamoTRIgine (LAMICTAL) 100 MG tablet, Take 100 mg by mouth At Bedtime, Disp: , Rfl:     loperamide (IMODIUM A-D) 2 MG tablet, 2 caplets after loose stool then 1 after each loose stool do not exceed 4 in 24hrs (Patient not taking: Reported on 8/31/2023), Disp: , Rfl:     loratadine (CLARITIN) 10 MG tablet, Take 1 tablet (10 mg) by mouth every morning, Disp: 28 tablet, Rfl: 10    omeprazole (PRILOSEC) 20 MG DR capsule, TAKE 1 CAPSULE BY MOUTH EVERY DAY, Disp: 28 capsule, Rfl: 2    OneTouch Delica Lancets 33G MISC, 1 each 4 times daily, Disp: 100 each, Rfl: 11    Pyridoxine HCl (VITAMIN B6) 100 MG TABS, Take 100 mg by mouth daily (Patient not taking: Reported on 8/9/2023), Disp: , Rfl:     QUEtiapine (SEROQUEL) 25 MG tablet, Take 25 mg by mouth 1 tablet 4 times daily as needed for agitation or hallucinations, Disp: , Rfl:     topiramate (TOPAMAX) 50 MG tablet, Take 1 tablet (50 " mg) by mouth 2 times daily, Disp: 60 tablet, Rfl: 3    traZODone (DESYREL) 50 MG tablet, Take 1-2 Tablets ( mg) by mouth at bedtime if needed for Sleep., Disp: , Rfl:      ALLERGIES:    Allergies   Allergen Reactions    Acetaminophen Other (See Comments)     pt previously tried to overdose on it, PMD does not want pt taking per pt.     Latex Rash        SOCIAL HISTORY:   Social History     Socioeconomic History    Marital status: Single     Spouse name: Not on file    Number of children: 0    Years of education: Not on file    Highest education level: Not on file   Occupational History    Not on file   Tobacco Use    Smoking status: Former     Types: Vaping Device, Cigarettes     Quit date: 2023     Years since quittin.5     Passive exposure: Yes    Smokeless tobacco: Never    Tobacco comments:     A pack every 3 days   Vaping Use    Vaping Use: Every day    Substances: Nicotine, Flavoring    Devices: Disposable   Substance and Sexual Activity    Alcohol use: No    Drug use: No    Sexual activity: Yes     Partners: Female     Birth control/protection: I.U.D.     Comment: Both male and female    Other Topics Concern    Parent/sibling w/ CABG, MI or angioplasty before 65F 55M? No   Social History Narrative    Not on file     Social Determinants of Health     Financial Resource Strain: Not on file   Food Insecurity: Not on file   Transportation Needs: Not on file   Physical Activity: Not on file   Stress: Not on file   Social Connections: Not on file   Intimate Partner Violence: Not on file   Housing Stability: Not on file        FAMILY HISTORY:   Family History   Problem Relation Age of Onset    Respiratory Mother         ASTHMA    Allergies Mother     Depression Mother     Chronic Obstructive Pulmonary Disease Mother     Anxiety Disorder Mother     Mental Illness Mother     Asthma Mother     Heart Disease Father         HEART VALVE REPLACEMENT    Hypertension Father     Diabetes Father     Depression  "Father     Anxiety Disorder Father     Mental Illness Father     Obesity Father     Respiratory Sister     Allergies Sister     Depression Sister     Respiratory Sister     Allergies Sister     Depression Sister     Respiratory Brother     Allergies Brother     Kidney Disease No family hx of         Vitals: /64   Ht 1.626 m (5' 4.02\")   Wt 101 kg (222 lb 10.6 oz)   BMI 38.20 kg/m         Lower Extremity Evaluation:     Dermatologic: Skin is intact to both lower extremities without significant lesions, rash or abrasion.  No paronychia or evidence of soft tissue infection is noted.  The nails are hypertrophic and discolored bilateral feet.     Vascular: DP & PT pulses are intact & regular bilaterally.   Capillary filling and skin temperature is normal to both lower extremities.  There are no varicosities, no edema and no trophic changes noted.      Neurologic:   No evidence of weakness in the lower extremities.  Noted evidence of neuropathy with diminished sensation bilaterally.       Musculoskeletal: Patient is ambulatory without assistive device or brace.  No gross ankle deformity noted.  No foot or ankle joint effusion is noted.  One notes hammertoe contracture of the lesser toes bilaterally.  Noted pain on palpation over the Anterior Talofibular ligament on the right ankle.  Mild edema noted over the lateral aspect of the right ankle.           Assessment:        ICD-10-CM    1. Type II diabetes mellitus with peripheral autonomic neuropathy (H)  E11.43 Ankle/Foot Bracing Supplies DME Ankle Brace; Right      2. Moderate ankle sprain, right, initial encounter  S93.401A Ankle/Foot Bracing Supplies DME Ankle Brace; Right              Plan:  I have explained to the patient the underlying condition affecting the feet.  At this point, the patient was educated on the importance of offloading supportive shoes and other devices.  I demonstrated to the patient calf stretches to perform every hour daily until " symptoms resolve.  After symptoms resolve, the patient was advised to perform the stretches 3 times daily to prevent future recurrence.  The patient was instructed to perform warm soaks with Epson salt after which to also apply over-the-counter Voltaren gel to deeply massage the injured tissue.  The patient was instructed to do this on a daily basis until symptoms resolve.  The patient was fitted with a Trilok ankle brace that will aid in offloading the tension forces to the soft tissues and prevent further inflammation. The patient may return in four weeks for reevaluation to determine if any further treatment will be needed.       I have discussed with the patient the importance of diabetic foot care and daily inspection of the feet.  The patient was instructed to come in anytime if there is any concern about the feet with redness, swelling or drainage is noted.    Divina verbalized agreement with and understanding of the rational for the diagnosis and treatment plan.  All questions were answered to best of my ability and the patient's satisfaction. The patient was advised to contact the clinic with any questions that may arise after the clinic visit.      Disclaimer: This note consists of symbols derived from keyboarding, dictation and/or voice recognition software. As a result, there may be errors in the script that have gone undetected. Please consider this when interpreting information found in this chart.        ERNA Douglas D.P.M., [email or web address].C.[email or web address].S.

## 2023-08-14 DIAGNOSIS — N18.31 TYPE 2 DIABETES MELLITUS WITH STAGE 3A CHRONIC KIDNEY DISEASE, WITH LONG-TERM CURRENT USE OF INSULIN (H): Primary | ICD-10-CM

## 2023-08-14 DIAGNOSIS — E11.22 TYPE 2 DIABETES MELLITUS WITH STAGE 3A CHRONIC KIDNEY DISEASE, WITH LONG-TERM CURRENT USE OF INSULIN (H): Primary | ICD-10-CM

## 2023-08-14 DIAGNOSIS — Z79.4 TYPE 2 DIABETES MELLITUS WITH STAGE 3A CHRONIC KIDNEY DISEASE, WITH LONG-TERM CURRENT USE OF INSULIN (H): Primary | ICD-10-CM

## 2023-08-17 ENCOUNTER — LAB (OUTPATIENT)
Dept: LAB | Facility: CLINIC | Age: 38
End: 2023-08-17
Payer: COMMERCIAL

## 2023-08-17 DIAGNOSIS — N18.31 TYPE 2 DIABETES MELLITUS WITH STAGE 3A CHRONIC KIDNEY DISEASE, WITH LONG-TERM CURRENT USE OF INSULIN (H): ICD-10-CM

## 2023-08-17 DIAGNOSIS — N18.30 STAGE 3 CHRONIC KIDNEY DISEASE (H): Chronic | ICD-10-CM

## 2023-08-17 DIAGNOSIS — E11.22 TYPE 2 DIABETES MELLITUS WITH STAGE 3A CHRONIC KIDNEY DISEASE, WITH LONG-TERM CURRENT USE OF INSULIN (H): ICD-10-CM

## 2023-08-17 DIAGNOSIS — Z79.4 TYPE 2 DIABETES MELLITUS WITH STAGE 3A CHRONIC KIDNEY DISEASE, WITH LONG-TERM CURRENT USE OF INSULIN (H): ICD-10-CM

## 2023-08-17 LAB
ALBUMIN SERPL BCG-MCNC: 3.6 G/DL (ref 3.5–5.2)
ANION GAP SERPL CALCULATED.3IONS-SCNC: 11 MMOL/L (ref 7–15)
BUN SERPL-MCNC: 33.8 MG/DL (ref 6–20)
CALCIUM SERPL-MCNC: 9.3 MG/DL (ref 8.6–10)
CHLORIDE SERPL-SCNC: 111 MMOL/L (ref 98–107)
CREAT SERPL-MCNC: 1.78 MG/DL (ref 0.51–0.95)
DEPRECATED HCO3 PLAS-SCNC: 21 MMOL/L (ref 22–29)
FASTING STATUS PATIENT QL REPORTED: YES
GFR SERPL CREATININE-BSD FRML MDRD: 37 ML/MIN/1.73M2
GLUCOSE SERPL-MCNC: 61 MG/DL (ref 70–99)
GLUCOSE SERPL-MCNC: 61 MG/DL (ref 70–99)
HGB BLD-MCNC: 11 G/DL (ref 11.7–15.7)
PHOSPHATE SERPL-MCNC: 4.6 MG/DL (ref 2.5–4.5)
POTASSIUM SERPL-SCNC: 4.8 MMOL/L (ref 3.4–5.3)
PTH-INTACT SERPL-MCNC: 47 PG/ML (ref 15–65)
SODIUM SERPL-SCNC: 143 MMOL/L (ref 136–145)

## 2023-08-17 PROCEDURE — 83970 ASSAY OF PARATHORMONE: CPT

## 2023-08-17 PROCEDURE — 84681 ASSAY OF C-PEPTIDE: CPT

## 2023-08-17 PROCEDURE — 85018 HEMOGLOBIN: CPT

## 2023-08-17 PROCEDURE — 36415 COLL VENOUS BLD VENIPUNCTURE: CPT

## 2023-08-17 PROCEDURE — 80069 RENAL FUNCTION PANEL: CPT

## 2023-08-18 LAB — C PEPTIDE SERPL-MCNC: 2.3 NG/ML (ref 0.9–6.9)

## 2023-08-22 ENCOUNTER — VIRTUAL VISIT (OUTPATIENT)
Dept: NEPHROLOGY | Facility: CLINIC | Age: 38
End: 2023-08-22
Payer: COMMERCIAL

## 2023-08-22 DIAGNOSIS — N18.31 STAGE 3A CHRONIC KIDNEY DISEASE (H): Primary | ICD-10-CM

## 2023-08-22 DIAGNOSIS — Z79.4 TYPE 2 DIABETES MELLITUS WITH STAGE 3A CHRONIC KIDNEY DISEASE, WITH LONG-TERM CURRENT USE OF INSULIN (H): ICD-10-CM

## 2023-08-22 DIAGNOSIS — I10 ESSENTIAL HYPERTENSION: ICD-10-CM

## 2023-08-22 DIAGNOSIS — E11.22 TYPE 2 DIABETES MELLITUS WITH STAGE 3A CHRONIC KIDNEY DISEASE, WITH LONG-TERM CURRENT USE OF INSULIN (H): ICD-10-CM

## 2023-08-22 DIAGNOSIS — N18.31 TYPE 2 DIABETES MELLITUS WITH STAGE 3A CHRONIC KIDNEY DISEASE, WITH LONG-TERM CURRENT USE OF INSULIN (H): ICD-10-CM

## 2023-08-22 DIAGNOSIS — N17.9 AKI (ACUTE KIDNEY INJURY) (H): ICD-10-CM

## 2023-08-22 PROCEDURE — 99214 OFFICE O/P EST MOD 30 MIN: CPT | Mod: VID | Performed by: INTERNAL MEDICINE

## 2023-08-22 ASSESSMENT — PAIN SCALES - GENERAL: PAINLEVEL: MILD PAIN (2)

## 2023-08-22 NOTE — PROGRESS NOTES
Virtual Visit Details    Type of service:  Video Visit     Originating Location (pt. Location): Home    Distant Location (provider location):  Off-site  Platform used for Video Visit: Lula      12/19/22     CC: CKD Stage 3, HTN    HPI: Divina Delgadillo is a 37 year old female who presents for follow-up of CKD. To review, her hx is significant for diabetes (~ 20 years), bipolar disease - was on lithium therapy for around 6 years but since our initial visit, she has since been taken off of it. Her diabetes was previously very poorly controlled  9% in Nov 2020, now 7.8% in October 2021. Creatinine has been 1.20-1.35 in the past year; stable at 1.35  today. Her last UPCR was 0.14 g/g in December of 2020 - she is taking 5 mg of lisinopril currently.     2019 visit: Divina presents for routine follow-up today.  Her creatinine has been 1.23-3.47 in the past year.  Her baseline seems to be someplace between 1.2 and 1.6 and she is stable at 1.41 at this time.  At her last visit she was very motivated to exercise and have been attending the gym regularly.  Unfortunately she has gotten away from the exercise to some degree but is still motivated to continue to make healthy lifestyle changes.  Her blood pressure is well controlled and she has no swelling concerns.  She is not using NSAIDs.    12/15/20: video visit. Creatinine is stable at this time. 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 Some back pain in the AM -she attributes this to her bed. BP has been good at recent visit. No lightheadedness unless blood sugar is low. No NSAIDs. Eating and drinking well. She had her teeth pulled at the end of May. She is getting new teeth in January so she is excited about that.     12/20/21: Divina is presenting for routine follow-up today. Her creatinine has been 1.20-1.35 over the last year. Her baseline seems to be 1.20-1.40, stable at 1.35 today. At her last visit (virtual) she was doing well  with her diabetes (diet and exercise). Still enjoys working at the [employer]. No new concerns today, overall doing well. Was able to quit smoking for two months, but did start again. Plans to stop in the future, encouraged her to continue with the cessation.    12/19/22: in person visit. Creatinine has been 1.06-1.37; stable at 1.24 at this time. No proteinuria on her check from Dec 2021. She quit smoking this past July - congratulated her on this success. BP high today but otherwise has been 120-69. She denies difficulty with UTIs. On ROS she reports headache episodes that last for a few hours - this has been going on for a couple of months - no vision changes but she notices being in the dark helps them. She has follow-up with primary coming up to discuss this further. No difficulty with yeast infections. BP high today but was 120-69 on Dec 5th visit.     08/22/23: video visit. She had a CT scan of the brain and found a malformation. Causing migraine headaches and dizzy spells. She had a fall in the kidney in July. She is eating/drinking ok. Some diarrhea - she has anywhere from 0-multiple. With the loose stools, could be dehydrated. She got a new job not too long ago - she was drinking a lot of water with work. She is not working now. She is still drinking a lot of water. BP recently in clinic was 110/64. She reports some lightheadedness at times now. She was going to get anesthesia for the MRI but it was canceled because of kidney function - this was at [facility]. It is rescheduled for Sept 18th. No NSAIDs. Topamax was added for migraine prevention. Nothing OTC. No nasal treatment.     ACCU-CHEK GUIDE test strip, Use to test blood sugar 3 times daily or as directed.  albuterol (VENTOLIN HFA) 108 (90 Base) MCG/ACT inhaler, INHALE 2 PUFFS INTO THE LUNGS EVERY SIX  HOURS AS NEEDED FOR SHORTNESS OF BREATH  ARIPiprazole (ABILIFY) 30 MG tablet, Take 30 mg by mouth daily  atorvastatin (LIPITOR) 10 MG tablet,  Take 1 tablet (10 mg) by mouth daily  Biotin 5 MG TABS, Take 1 tablet by mouth daily  blood glucose (ONETOUCH VERIO IQ) test strip, Use to test blood sugar up to 3 times daily  Blood Glucose Monitoring Suppl (ONETOUCH VERIO FLEX SYSTEM) w/Device KIT, 1 each daily  carvedilol (COREG) 12.5 MG tablet, Take 1 tablet (12.5 mg) by mouth 2 times daily  cloZAPine (CLOZARIL) 100 MG tablet, Take 50 mg by mouth 2 times daily  Continuous Blood Gluc Transmit (DEXCOM G6 TRANSMITTER) MISC, 1 each every 3 months Change every 3 months. USE TO READ BLOOD SUGARS PER  INSTRUCTIONS  FLUoxetine (PROZAC) 40 MG capsule, Take 40 mg by mouth daily  glucose (BD GLUCOSE) 4 g chewable tablet, Take 1 tablet by mouth every hour as needed for low blood sugar (Patient not taking: Reported on 8/31/2023)  hydrOXYzine (ATARAX) 25 MG tablet, Take 1 Tablet (25 mg) by mouth 3 times daily if needed for Anxiety.  insulin aspart (NOVOLOG VIAL) 100 UNITS/ML vial, For use with continuous insulin pump.  Max TDD 60.  Insulin Aspart FlexPen 100 UNIT/ML SOPN, Inject 1-14 Units Subcutaneous 3 times daily (with meals) 3 times daily (with meals) (Use Novlog on meal size small meal 15-30gm carb: 3 units, average meal 45-60gm carb: 5 units, large meal 60gm+ carb: 7 units + pre meal correction 1:40>150. Max dose per 24 hours is 50 units)  insulin pen needle (32G X 6 MM) 32G X 6 MM miscellaneous, Use 4 pen needles daily.  insulin pen needle (ULTICARE MINI) 31G X 6 MM miscellaneous, USE 1 PEN NEEDLE 4 times daily  lamoTRIgine (LAMICTAL) 100 MG tablet, Take 100 mg by mouth At Bedtime  loperamide (IMODIUM A-D) 2 MG tablet, 2 caplets after loose stool then 1 after each loose stool do not exceed 4 in 24hrs (Patient not taking: Reported on 8/31/2023)  loratadine (CLARITIN) 10 MG tablet, Take 1 tablet (10 mg) by mouth every morning  omeprazole (PRILOSEC) 20 MG DR capsule, TAKE 1 CAPSULE BY MOUTH EVERY DAY  OneTouch Delica Lancets 33G MISC, 1 each 4 times  "daily  QUEtiapine (SEROQUEL) 25 MG tablet, Take 25 mg by mouth 1 tablet 4 times daily as needed for agitation or hallucinations  topiramate (TOPAMAX) 50 MG tablet, Take 1 tablet (50 mg) by mouth 2 times daily  traZODone (DESYREL) 50 MG tablet, Take 1-2 Tablets ( mg) by mouth at bedtime if needed for Sleep.  acetaminophen (TYLENOL) 325 MG tablet, Take 325-650 mg by mouth 2 tab every 4-6 hours as needed, do not exceed 9 tabs in 24hours (Patient not taking: Reported on 8/9/2023)  Biotin 5000 MCG CAPS, Take 1 tablet by mouth daily at 2 pm (Patient not taking: Reported on 8/22/2023)  COMPOUNDED NON-CONTROLLED SUBSTANCE (CMPD RX) - PHARMACY TO MIX COMPOUNDED MEDICATION, Chloramphenicol 50 mg, sulfamethoxazole 50 mg, amphotericin B 5 mg, hydrocortisone 1 mg. 2-3 puffs to affected ear PRN itching/drainage (Patient not taking: Reported on 7/13/2023)  Continuous Blood Gluc Sensor (DEXCOM G6 SENSOR) MISC, 1 each every 10 days. Change every 10 days. USE TO READ BLOOD SUGARS PER  INSTRUCTIONS  Continuous Blood Gluc Sensor (GUARDIAN 4 GLUCOSE SENSOR) MISC, 1 each every 7 days Change sensor every 7 days  Continuous Blood Gluc Transmit (GUARDIAN 4 TRANSMITTER) MISC, 1 each every 7 days Change sensor every 7 days (Patient not taking: Reported on 8/9/2023)  fluocinolone acetonide 0.01 % OIL, Place 4 drops in ear(s) 2 times daily as needed (ear itching) (Patient not taking: Reported on 7/13/2023)  Insulin Infusion Pump (MINIMED 780G INSULIN PUMP) KIT, 1 each daily SmartGuard Target: 100; Active Insulin Time: 2 hours (recommended); SmartGuardTM Warmup/Manual Mode: Suspend before low (recommended) (Patient not taking: Reported on 8/22/2023)  Insulin Infusion Pump Supplies (EXTENDED INFUSION SET 23\"/6MM) MISC, 1 each every 7 days Max Total Daily Dose 70 units; Change infusion set & reservoir every 7 days (recommended) (Patient not taking: Reported on 8/22/2023)  Insulin Infusion Pump Supplies (EXTENDED RESERVOIR 3ML) " MISC, 1 each every 7 days (Patient not taking: Reported on 8/22/2023)  Pyridoxine HCl (VITAMIN B6) 100 MG TABS, Take 100 mg by mouth daily (Patient not taking: Reported on 8/9/2023)    No current facility-administered medications on file prior to visit.    Exam:  There were no vitals taken for this visit.  GENERAL: Healthy, alert and no distress    Results:  No visits with results within 1 Day(s) from this visit.   Latest known visit with results is:   Lab on 08/17/2023   Component Date Value Ref Range Status    Sodium 08/17/2023 143  136 - 145 mmol/L Final    Potassium 08/17/2023 4.8  3.4 - 5.3 mmol/L Final    Chloride 08/17/2023 111 (H)  98 - 107 mmol/L Final    Carbon Dioxide (CO2) 08/17/2023 21 (L)  22 - 29 mmol/L Final    Anion Gap 08/17/2023 11  7 - 15 mmol/L Final    Glucose 08/17/2023 61 (L)  70 - 99 mg/dL Final    Urea Nitrogen 08/17/2023 33.8 (H)  6.0 - 20.0 mg/dL Final    Creatinine 08/17/2023 1.78 (H)  0.51 - 0.95 mg/dL Final    GFR Estimate 08/17/2023 37 (L)  >60 mL/min/1.73m2 Final    Calcium 08/17/2023 9.3  8.6 - 10.0 mg/dL Final    Albumin 08/17/2023 3.6  3.5 - 5.2 g/dL Final    Phosphorus 08/17/2023 4.6 (H)  2.5 - 4.5 mg/dL Final    Parathyroid Hormone Intact 08/17/2023 47  15 - 65 pg/mL Final    Hemoglobin 08/17/2023 11.0 (L)  11.7 - 15.7 g/dL Final    Glucose 08/17/2023 61 (L)  70 - 99 mg/dL Final    Patient Fasting > 8hrs? 08/17/2023 Yes   Final    C Peptide 08/17/2023 2.3  0.9 - 6.9 ng/mL Final             Assessment/Plan:   1. CKD Stage 3: risk factors for kidney disease include longstanding hypertension, diabetes, as well as longterm previous lithium use. She is now away from lithium which is great to see given she is already at risk for diabetic nephropathy and would like to minimize risk.  She is on a low-dose of lisinopril but given her KIRSTIE on CKD, will hold lisinopril for the time being. Encourage good hydration to assure not prerenal.     2. DM: Her A1c at this time is 8.1%  - stress the  importance of good diabetes control. Low threshold to add SGLT2 inhibitor, however, given normal proteinuria, will defer to her diabetes care team.     3. Bipolar: now off of lithium    4. GERD: ideally would avoid PPI therapy given increased risk of incident CKD and AIN noted with PPI therapy. Tolerating once daily dosing - I am not certain we will be able to get away from PPI completely given the severity of her sxs previously.     5. Hypertension: holding lisinporil given KIRSTIE.     Brain MRI needs: her GFR is greater than 30. In regards to gadolinium, I typically consider a GFR as the cut off point when gadolinium should be avoided. Encouraged patient to be well hydrated going into the exam. Given risk of Nephrogenic systemic fibrosis associated with advanced kidney disease, recommend avoidance of gadolinium if GFR is less than 30.     Patient Instructions   Hold the lisinopril.   Focus on 100 oz fluid intake daily to prevent stones and avoid dehydration.   Follow-up as planned in October.   Labs next week at [facility]     [id number] AM video visit via [facility] - offsite  Sánchez Dias DO

## 2023-08-22 NOTE — NURSING NOTE
Is the patient currently in the state of [place]? YES    Visit mode:VIDEO    If the visit is dropped, the patient can be reconnected by: VIDEO VISIT: Text to cell phone:   Telephone Information:   Mobile [phone]       Will anyone else be joining the visit? YES: How would they like to receive their invitation? Text to cell phone: NA  (If patient encounters technical issues they should call [phone]    How would you like to obtain your AVS? MyChart    Are changes needed to the allergy or medication list? No PT is not yet using the Guardian Transmitter.    Reason for visit: RECHECK    Lorraine TREVINOF

## 2023-08-22 NOTE — PATIENT INSTRUCTIONS
Hold the lisinopril.   Focus on 100 oz fluid intake daily to prevent stones and avoid dehydration.   Follow-up as planned in October.   Labs next week at [facility]

## 2023-08-29 ENCOUNTER — ALLIED HEALTH/NURSE VISIT (OUTPATIENT)
Dept: EDUCATION SERVICES | Facility: CLINIC | Age: 38
End: 2023-08-29
Payer: COMMERCIAL

## 2023-08-29 DIAGNOSIS — E11.22 TYPE 2 DIABETES MELLITUS WITH STAGE 3A CHRONIC KIDNEY DISEASE, WITH LONG-TERM CURRENT USE OF INSULIN (H): Primary | ICD-10-CM

## 2023-08-29 DIAGNOSIS — N18.31 TYPE 2 DIABETES MELLITUS WITH STAGE 3A CHRONIC KIDNEY DISEASE, WITH LONG-TERM CURRENT USE OF INSULIN (H): Primary | ICD-10-CM

## 2023-08-29 DIAGNOSIS — Z79.4 TYPE 2 DIABETES MELLITUS WITH STAGE 3A CHRONIC KIDNEY DISEASE, WITH LONG-TERM CURRENT USE OF INSULIN (H): Primary | ICD-10-CM

## 2023-08-29 PROCEDURE — G0108 DIAB MANAGE TRN  PER INDIV: HCPCS | Performed by: DIETITIAN, REGISTERED

## 2023-08-29 NOTE — PROGRESS NOTES
Diabetes Self-Management Education & Support  Presents for: Follow-up  Type of Service: In Person Visit    Assessment Type:    Insulin Pump Concepts:  Balancing glucose and insulin  Calculating boluses  Problem solving with insulin pump therapy (BG monitoring; hypoglycemia signs/symptoms, treatment (glucagon) and prevention; hyperglycemia signs/symptoms, treatment and prevention; ketones, DKA signs/symptoms and prevention)    Opportunities for ongoing education and support in diabetes-self management were discussed.  Pt verbalized understanding of concepts discussed and recommendations provided today.     Continue education with the following diabetes management concepts: Insulin Pump Concepts Balancing glucose and insulin  Carbohydrate counting  Calculating boluses  Problem solving with insulin pump therapy (BG monitoring; hypoglycemia signs/symptoms, treatment (glucagon) and prevention; hyperglycemia signs/symptoms, treatment and prevention; ketones, DKA signs/symptoms and prevention)  Hands on practice with basic pump button use     ASSESSMENT:  Divina established with endocrinology and is working towards a 780G Medtronic pump given the need for long term MDI.  Currently in approval process by insurance.  Today reviewed what the pump looks like, tubing, infusion sets, basal versus bolus insulin, infusion set issues, and the change from long acting to rapid acting only.    Reviewed basal rate examples in the pump and how automated dosing works.  Reviewed bolusing,meals and carbohydrate awareness.   Recommend additional pre pump training and if able to get approved would benefit from practice with the Medtronic sensor & infusion sets where she can place them multiple times with rep present before the pump start; kashif reach out to Ella from [employer] about this.   Reviewed use of rapid acting insulin and is doing a better job of adjusting it based on meal size.  Has some hypoglycemia and likely related to taking too  "much rapid acting.    Today Divina corrected and went low on dexcom into the 60s, blood sugar improved to 110 via blood finger stick after apple juice.  Talked about appropriate correction; likely could have only used 2 units today instead of 4units.   (1:40>150).   Reviewed her new job and schedule in detail.     PLAN  Follow recommend meal doses   Glucose tabs with at all times; pre treat lows at work versus working through them  Follow correction scale; round down if active     See Care Plan for co-developed, patient-state behavior change goals.  AVS provided for patient today.    SUBJECTIVE/OBJECTIVE:  Presents for: Follow-up  Accompanied by: Self  Diabetes education in the past 24mo: Yes  Focus of Visit: Monitoring, Reducing Risks, Taking Medication  Disease course: Improving  How confident are you filling out medical forms by yourself:: Quite a bit  Other concerns:: None  Cultural Influences/Ethnic Background:  Not  or     Diabetes Symptoms & Complications:  Fatigue: Yes  Neuropathy: Yes  Polydipsia: Yes  Polyphagia: Yes  Polyuria: Yes  Visual change: No  Slow healing wounds: No  Autonomic neuropathy: Yes  CVA: No  Heart disease: No  Nephropathy: Yes  Peripheral neuropathy: Yes  Peripheral Vascular Disease: No  Retinopathy: No  Sexual dysfunction: No    Patient Problem List and Family Medical History reviewed for relevant medical history, current medical status, and diabetes risk factors.    Vitals:  There were no vitals taken for this visit.  Estimated body mass index is 38.2 kg/m  as calculated from the following:    Height as of 8/10/23: 1.626 m (5' 4.02\").    Weight as of 8/10/23: 101 kg (222 lb 10.6 oz).   Last 3 BP:   BP Readings from Last 3 Encounters:   08/10/23 110/64   08/04/23 138/63   08/02/23 108/60       History   Smoking Status    Former    Types: Vaping Device, Cigarettes    Quit date: 2/12/2023   Smokeless Tobacco    Never       Labs:  Lab Results   Component Value Date    A1C 8.3 " 07/13/2023    A1C 7.4 07/08/2021     Lab Results   Component Value Date    GLC 61 08/17/2023    GLC 61 08/17/2023     08/04/2023     12/19/2022     07/08/2021     Lab Results   Component Value Date    LDL 81 12/21/2022    LDL 72 03/26/2021     HDL Cholesterol   Date Value Ref Range Status   03/26/2021 57 >49 mg/dL Final     Direct Measure HDL   Date Value Ref Range Status   12/21/2022 72 >=50 mg/dL Final   ]  GFR Estimate   Date Value Ref Range Status   08/17/2023 37 (L) >60 mL/min/1.73m2 Final   07/08/2021 52 (L) >60 mL/min/[1.73_m2] Final     Comment:     Non  GFR Calc  Starting 12/18/2018, serum creatinine based estimated GFR (eGFR) will be   calculated using the Chronic Kidney Disease Epidemiology Collaboration   (CKD-EPI) equation.       GFR Estimate If Black   Date Value Ref Range Status   07/08/2021 60 (L) >60 mL/min/[1.73_m2] Final     Comment:      GFR Calc  Starting 12/18/2018, serum creatinine based estimated GFR (eGFR) will be   calculated using the Chronic Kidney Disease Epidemiology Collaboration   (CKD-EPI) equation.       Lab Results   Component Value Date    CR 1.78 08/17/2023    CR 1.32 07/08/2021     No results found for: MICROALBUMIN    Healthy Eating:  Healthy Eating Assessed Today: Yes  Cultural/Jainism diet restrictions?: No  Meal planning/habits: None  How many times a week on average do you eat food made away from home (restaurant/take-out)?: 3    Being Active:  Being Active Assessed Today: Yes  Exercise:: Yes  Barrier to exercise: None    Monitoring:  Monitoring Assessed Today: Yes  Blood Glucose Meter: CGM  Times checking blood sugar at home (number): 5+  Times checking blood sugar at home (per): Day  Blood glucose trend: Other    Taking Medications:  Diabetes Medication(s)       Diabetic Other       glucose (BD GLUCOSE) 4 g chewable tablet    Take 1 tablet by mouth every hour as needed for low blood sugar      Insulin       insulin  aspart (NOVOLOG VIAL) 100 UNITS/ML vial    For use with continuous insulin pump.  Max TDD 60.     Insulin Aspart FlexPen 100 UNIT/ML SOPN    Inject 1-14 Units Subcutaneous 3 times daily (with meals) 3 times daily (with meals) (Use Novlog on meal size small meal 15-30gm carb: 3 units, average meal 45-60gm carb: 5 units, large meal 60gm+ carb: 7 units + pre meal correction 1:40>150. Max dose per 24 hours is 50 units)     insulin glargine (LANTUS SOLOSTAR) 100 UNIT/ML pen    Inject 34 Units Subcutaneous every morning            Current Treatments: Insulin Injections  Dose schedule: Pre-breakfast, Pre-lunch, Pre-dinner  Given by: Patient    Problem Solving:   Not assessed at this visit     Reducing Risks:  Reducing Risks Assessed Today: Yes  CAD Risks: Diabetes Mellitus, Family history, Hypertension, Stress  Has dilated eye exam at least once a year?: No  Sees dentist every 6 months?: No  Feet checked by healthcare provider in the last year?: No    Healthy Coping:  Healthy Coping Assessed Today: Yes  Emotional response to diabetes: Ready to learn  Informal Support system:: Family, Other, Brenda based  Stage of change: ACTION (Actively working towards change)  Patient Activation Measure Survey Score:      5/10/2018     1:00 PM   EMY Score (Last Two)   EMY Raw Score 35   Activation Score 72.1   EMY Level 3     Care Plan and Education Provided:  Care Plan: Diabetes   Updates made by Dolly Riley RD since 8/29/2023 12:00 AM        Problem: HbA1C Not In Goal         Goal: Establish Regular Follow-Ups with PCP         Task: Discuss with PCP the recommended timing for patient's next follow up visit(s)    Responsible User: Dolly Riley RD        Task: Discuss schedule for PCP visits with patient    Responsible User: Dolly Riley RD        Goal: Get HbA1C Level in Goal         Task: Educate patient on diabetes education self-management topics    Responsible User: Dolly Riley RD        Task:  Educate patient on benefits of regular glucose monitoring    Responsible User: Dolly Riley RD        Task: Refer patient to appropriate extended care team member, as needed (Medication Therapy Management, Behavioral Health, Physical Therapy, etc.)    Responsible User: Dolly Riley RD        Task: Discuss diabetes treatment plan with patient    Responsible User: Dolly Riley RD        Problem: Diabetes Self-Management Education Needed to Optimize Self-Care Behaviors         Goal: Understand diabetes pathophysiology and disease progression         Task: Provide education on diabetes pathophysiology and disease progression specfic to patient's diabetes type    Responsible User: Dolly Riley RD        Goal: Healthy Eating - follow a healthy eating pattern for diabetes         Task: Provide education on portion control and consistency in amount, composition and timing of food intake    Responsible User: Dolly Riley RD        Task: Provide education on managing carbohydrate intake (carbohydrate counting, plate planning method, etc.)    Responsible User: Dolly Riley RD        Task: Provide education on weight management    Responsible User: Dolly Riley RD        Task: Provide education on heart healthy eating    Responsible User: Dolly Riley RD        Task: Provide education on eating out    Responsible User: Dolly Riley RD        Task: Develop individualized healthy eating plan with patient    Responsible User: Dolly Riley RD        Goal: Being Active - get regular physical activity, working up to at least 150 minutes per week         Task: Provide education on relationship of activity to glucose and precautions to take if at risk for low glucose    Responsible User: Dolly Riley RD        Task: Discuss barriers to physical activity with patient    Responsible User: Dolly Riley RD        Task: Develop physical activity  plan with patient    Responsible User: Dolly Riley RD        Task: Explore community resources including walking groups, assistance programs, and home videos    Responsible User: Dolly Riley RD        Goal: Monitoring - monitor glucose and ketones as directed         Task: Provide education on blood glucose monitoring (purpose, proper technique, frequency, glucose targets, interpreting results, when to use glucose control solution, sharps disposal)    Responsible User: Dolly Riley RD        Task: Provide education on continuous glucose monitoring (sensor placement, use of kwesi or /reader, understanding glucose trends, alerts and alarms, differences between sensor glucose and blood glucose)    Responsible User: Dolly Riley RD        Task: Provide education on ketone monitoring (when to monitor, frequency, etc.)    Responsible User: Dolly Riley RD        Goal: Taking Medication - patient is consistently taking medications as directed         Task: Provide education on action of prescribed medication, including when to take and possible side effects Completed 8/29/2023   Responsible User: Dolly Riley RD        Task: Provide education on insulin and injectable diabetes medications, including administration, storage, site selection and rotation for injection sites Completed 8/29/2023   Responsible User: Dolly Riley RD        Task: Discuss barriers to medication adherence with patient and provide management technique ideas as appropriate Completed 8/29/2023   Responsible User: Dolly Riley RD        Task: Provide education on frequency and refill details of medications    Responsible User: Dolly Riley RD        Goal: Problem Solving - know how to prevent and manage short-term diabetes complications         Task: Provide education on high blood glucose - causes, signs/symptoms, prevention and treatment Completed 8/29/2023   Responsible  User: Dolly Riley RD        Task: Provide education on low blood glucose - causes, signs/symptoms, prevention, treatment, carrying a carbohydrate source at all times, and medical identification Completed 8/29/2023   Responsible User: Dolly Riley RD        Task: Provide education on safe travel with diabetes    Responsible User: Dolly Riley RD        Task: Provide education on how to care for diabetes on sick days    Responsible User: Dolly Riley RD        Task: Provide education on when to call a health care provider    Responsible User: Dolly Riley RD        Goal: Reducing Risks - know how to prevent and treat long-term diabetes complications         Task: Provide education on major complications of diabetes, prevention, early diagnostic measures and treatment of complications    Responsible User: Dolly Riley RD        Task: Provide education on recommended care for dental, eye and foot health    Responsible User: Dolly Riley RD        Task: Provide education on Hemoglobin A1c - goals and relationship to blood glucose levels    Responsible User: Dolly Riley RD        Task: Provide education on recommendations for heart health - lipid levels and goals, blood pressure and goals, and aspirin therapy, if indicated    Responsible User: Dolly Riley RD        Task: Provide education on tobacco cessation    Responsible User: Dolly Riley RD        Goal: Healthy Coping - use available resources to cope with the challenges of managing diabetes         Task: Discuss recognizing feelings about having diabetes    Responsible User: Dolly Riley RD        Task: Provide education on the benefits of making appropriate lifestyle changes    Responsible User: Dolly Riley RD        Task: Provide education on benefits of utilizing support systems    Responsible User: Dolly Riley RD        Task: Discuss methods for coping  with stress    Responsible User: Dolly Riley RD        Task: Provide education on when to seek professional counseling    Responsible User: Dolly Riley RD Elizabeth Swartout RD, LD, [facility]  Diabetes Education      Time Spent: 60minutes  Encounter Type: Individual    Any diabetes medication dose changes were made via the CDE Protocol per the patient's endocrinology provider. A copy of this encounter was shared with the provider.

## 2023-08-29 NOTE — LETTER
8/29/2023         RE: Divina Delgadillo  [id number] [facility] [id number]        Dear Colleague,    Thank you for referring your patient, Divina Delgadillo, to the [facility] [place]. Please see a copy of my visit note below.    Diabetes Self-Management Education & Support  Presents for: Follow-up  Type of Service: In Person Visit    Assessment Type:    Insulin Pump Concepts:  Balancing glucose and insulin  Calculating boluses  Problem solving with insulin pump therapy (BG monitoring; hypoglycemia signs/symptoms, treatment (glucagon) and prevention; hyperglycemia signs/symptoms, treatment and prevention; ketones, DKA signs/symptoms and prevention)    Opportunities for ongoing education and support in diabetes-self management were discussed.  Pt verbalized understanding of concepts discussed and recommendations provided today.     Continue education with the following diabetes management concepts: Insulin Pump Concepts Balancing glucose and insulin  Carbohydrate counting  Calculating boluses  Problem solving with insulin pump therapy (BG monitoring; hypoglycemia signs/symptoms, treatment (glucagon) and prevention; hyperglycemia signs/symptoms, treatment and prevention; ketones, DKA signs/symptoms and prevention)  Hands on practice with basic pump button use     ASSESSMENT:  Divina established with endocrinology and is working towards a 780G Medtronic pump given the need for long term MDI.  Currently in approval process by insurance.  Today reviewed what the pump looks like, tubing, infusion sets, basal versus bolus insulin, infusion set issues, and the change from long acting to rapid acting only.    Reviewed basal rate examples in the pump and how automated dosing works.  Reviewed bolusing,meals and carbohydrate awareness.   Recommend additional pre pump training and if able to get approved would benefit from practice with the Medtronic sensor & infusion sets where she can place  "them multiple times with rep present before the pump start; kashif reach out to Ella from [employer] about this.   Reviewed use of rapid acting insulin and is doing a better job of adjusting it based on meal size.  Has some hypoglycemia and likely related to taking too much rapid acting.    Today Divina corrected and went low on dexcom into the 60s, blood sugar improved to 110 via blood finger stick after apple juice.  Talked about appropriate correction; likely could have only used 2 units today instead of 4units.   (1:40>150).   Reviewed her new job and schedule in detail.     PLAN  Follow recommend meal doses   Glucose tabs with at all times; pre treat lows at work versus working through them  Follow correction scale; round down if active     See Care Plan for co-developed, patient-state behavior change goals.  AVS provided for patient today.    SUBJECTIVE/OBJECTIVE:  Presents for: Follow-up  Accompanied by: Self  Diabetes education in the past 24mo: Yes  Focus of Visit: Monitoring, Reducing Risks, Taking Medication  Disease course: Improving  How confident are you filling out medical forms by yourself:: Quite a bit  Other concerns:: None  Cultural Influences/Ethnic Background:  Not  or     Diabetes Symptoms & Complications:  Fatigue: Yes  Neuropathy: Yes  Polydipsia: Yes  Polyphagia: Yes  Polyuria: Yes  Visual change: No  Slow healing wounds: No  Autonomic neuropathy: Yes  CVA: No  Heart disease: No  Nephropathy: Yes  Peripheral neuropathy: Yes  Peripheral Vascular Disease: No  Retinopathy: No  Sexual dysfunction: No    Patient Problem List and Family Medical History reviewed for relevant medical history, current medical status, and diabetes risk factors.    Vitals:  There were no vitals taken for this visit.  Estimated body mass index is 38.2 kg/m  as calculated from the following:    Height as of 8/10/23: 1.626 m (5' 4.02\").    Weight as of 8/10/23: 101 kg (222 lb 10.6 oz).   Last 3 BP:   BP " Readings from Last 3 Encounters:   08/10/23 110/64   08/04/23 138/63   08/02/23 108/60       History   Smoking Status     Former     Types: Vaping Device, Cigarettes     Quit date: 2/12/2023   Smokeless Tobacco     Never       Labs:  Lab Results   Component Value Date    A1C 8.3 07/13/2023    A1C 7.4 07/08/2021     Lab Results   Component Value Date    GLC 61 08/17/2023    GLC 61 08/17/2023     08/04/2023     12/19/2022     07/08/2021     Lab Results   Component Value Date    LDL 81 12/21/2022    LDL 72 03/26/2021     HDL Cholesterol   Date Value Ref Range Status   03/26/2021 57 >49 mg/dL Final     Direct Measure HDL   Date Value Ref Range Status   12/21/2022 72 >=50 mg/dL Final   ]  GFR Estimate   Date Value Ref Range Status   08/17/2023 37 (L) >60 mL/min/1.73m2 Final   07/08/2021 52 (L) >60 mL/min/[1.73_m2] Final     Comment:     Non  GFR Calc  Starting 12/18/2018, serum creatinine based estimated GFR (eGFR) will be   calculated using the Chronic Kidney Disease Epidemiology Collaboration   (CKD-EPI) equation.       GFR Estimate If Black   Date Value Ref Range Status   07/08/2021 60 (L) >60 mL/min/[1.73_m2] Final     Comment:      GFR Calc  Starting 12/18/2018, serum creatinine based estimated GFR (eGFR) will be   calculated using the Chronic Kidney Disease Epidemiology Collaboration   (CKD-EPI) equation.       Lab Results   Component Value Date    CR 1.78 08/17/2023    CR 1.32 07/08/2021     No results found for: MICROALBUMIN    Healthy Eating:  Healthy Eating Assessed Today: Yes  Cultural/Restoration diet restrictions?: No  Meal planning/habits: None  How many times a week on average do you eat food made away from home (restaurant/take-out)?: 3    Being Active:  Being Active Assessed Today: Yes  Exercise:: Yes  Barrier to exercise: None    Monitoring:  Monitoring Assessed Today: Yes  Blood Glucose Meter: CGM  Times checking blood sugar at home (number): 5+  Times  checking blood sugar at home (per): Day  Blood glucose trend: Other    Taking Medications:  Diabetes Medication(s)       Diabetic Other       glucose (BD GLUCOSE) 4 g chewable tablet    Take 1 tablet by mouth every hour as needed for low blood sugar      Insulin       insulin aspart (NOVOLOG VIAL) 100 UNITS/ML vial    For use with continuous insulin pump.  Max TDD 60.     Insulin Aspart FlexPen 100 UNIT/ML SOPN    Inject 1-14 Units Subcutaneous 3 times daily (with meals) 3 times daily (with meals) (Use Novlog on meal size small meal 15-30gm carb: 3 units, average meal 45-60gm carb: 5 units, large meal 60gm+ carb: 7 units + pre meal correction 1:40>150. Max dose per 24 hours is 50 units)     insulin glargine (LANTUS SOLOSTAR) 100 UNIT/ML pen    Inject 34 Units Subcutaneous every morning            Current Treatments: Insulin Injections  Dose schedule: Pre-breakfast, Pre-lunch, Pre-dinner  Given by: Patient    Problem Solving:   Not assessed at this visit     Reducing Risks:  Reducing Risks Assessed Today: Yes  CAD Risks: Diabetes Mellitus, Family history, Hypertension, Stress  Has dilated eye exam at least once a year?: No  Sees dentist every 6 months?: No  Feet checked by healthcare provider in the last year?: No    Healthy Coping:  Healthy Coping Assessed Today: Yes  Emotional response to diabetes: Ready to learn  Informal Support system:: Family, Other, Brenda based  Stage of change: ACTION (Actively working towards change)  Patient Activation Measure Survey Score:      5/10/2018     1:00 PM   EMY Score (Last Two)   EMY Raw Score 35   Activation Score 72.1   EMY Level 3     Care Plan and Education Provided:  Care Plan: Diabetes   Updates made by Dolly Riley RD since 8/29/2023 12:00 AM        Problem: HbA1C Not In Goal         Goal: Establish Regular Follow-Ups with PCP         Task: Discuss with PCP the recommended timing for patient's next follow up visit(s)    Responsible User: Dolly Riley RD         Task: Discuss schedule for PCP visits with patient    Responsible User: Dolly Riley RD        Goal: Get HbA1C Level in Goal         Task: Educate patient on diabetes education self-management topics    Responsible User: Dolly Riley RD        Task: Educate patient on benefits of regular glucose monitoring    Responsible User: Dolly Riley RD        Task: Refer patient to appropriate extended care team member, as needed (Medication Therapy Management, Behavioral Health, Physical Therapy, etc.)    Responsible User: Dolly Riley RD        Task: Discuss diabetes treatment plan with patient    Responsible User: Dolly Riley RD        Problem: Diabetes Self-Management Education Needed to Optimize Self-Care Behaviors         Goal: Understand diabetes pathophysiology and disease progression         Task: Provide education on diabetes pathophysiology and disease progression specfic to patient's diabetes type    Responsible User: Dolly Riley RD        Goal: Healthy Eating - follow a healthy eating pattern for diabetes         Task: Provide education on portion control and consistency in amount, composition and timing of food intake    Responsible User: Dolly Riley RD        Task: Provide education on managing carbohydrate intake (carbohydrate counting, plate planning method, etc.)    Responsible User: Dolly Riley RD        Task: Provide education on weight management    Responsible User: Dolly Riley RD        Task: Provide education on heart healthy eating    Responsible User: Dolly Riley RD        Task: Provide education on eating out    Responsible User: Dolly Riley RD        Task: Develop individualized healthy eating plan with patient    Responsible User: Dolly Riley RD        Goal: Being Active - get regular physical activity, working up to at least 150 minutes per week         Task: Provide education on  relationship of activity to glucose and precautions to take if at risk for low glucose    Responsible User: Dolly Riley RD        Task: Discuss barriers to physical activity with patient    Responsible User: Dolly Riley RD        Task: Develop physical activity plan with patient    Responsible User: Dolly Riley RD        Task: Explore community resources including walking groups, assistance programs, and home videos    Responsible User: Dolly Riley RD        Goal: Monitoring - monitor glucose and ketones as directed         Task: Provide education on blood glucose monitoring (purpose, proper technique, frequency, glucose targets, interpreting results, when to use glucose control solution, sharps disposal)    Responsible User: Dolly Riley RD        Task: Provide education on continuous glucose monitoring (sensor placement, use of kwesi or /reader, understanding glucose trends, alerts and alarms, differences between sensor glucose and blood glucose)    Responsible User: Dolly Riley RD        Task: Provide education on ketone monitoring (when to monitor, frequency, etc.)    Responsible User: Dolly Riley RD        Goal: Taking Medication - patient is consistently taking medications as directed         Task: Provide education on action of prescribed medication, including when to take and possible side effects Completed 8/29/2023   Responsible User: Dolly Riley RD        Task: Provide education on insulin and injectable diabetes medications, including administration, storage, site selection and rotation for injection sites Completed 8/29/2023   Responsible User: Dolly Riley RD        Task: Discuss barriers to medication adherence with patient and provide management technique ideas as appropriate Completed 8/29/2023   Responsible User: Dolly Riley RD        Task: Provide education on frequency and refill details of medications     Responsible User: Dolly Riley RD        Goal: Problem Solving - know how to prevent and manage short-term diabetes complications         Task: Provide education on high blood glucose - causes, signs/symptoms, prevention and treatment Completed 8/29/2023   Responsible User: Dolly Riley RD        Task: Provide education on low blood glucose - causes, signs/symptoms, prevention, treatment, carrying a carbohydrate source at all times, and medical identification Completed 8/29/2023   Responsible User: Dolly Riley RD        Task: Provide education on safe travel with diabetes    Responsible User: Dolly Riley RD        Task: Provide education on how to care for diabetes on sick days    Responsible User: Dolly Riley RD        Task: Provide education on when to call a health care provider    Responsible User: Dolly Riley RD        Goal: Reducing Risks - know how to prevent and treat long-term diabetes complications         Task: Provide education on major complications of diabetes, prevention, early diagnostic measures and treatment of complications    Responsible User: Dolly Riley RD        Task: Provide education on recommended care for dental, eye and foot health    Responsible User: Dolly Riley RD        Task: Provide education on Hemoglobin A1c - goals and relationship to blood glucose levels    Responsible User: Dolly Riley RD        Task: Provide education on recommendations for heart health - lipid levels and goals, blood pressure and goals, and aspirin therapy, if indicated    Responsible User: Dolly Riley RD        Task: Provide education on tobacco cessation    Responsible User: Dolly Riley RD        Goal: Healthy Coping - use available resources to cope with the challenges of managing diabetes         Task: Discuss recognizing feelings about having diabetes    Responsible User: Dolly Riley RD        Task:  Provide education on the benefits of making appropriate lifestyle changes    Responsible User: Dolly Riley RD        Task: Provide education on benefits of utilizing support systems    Responsible User: Dolly Riley RD        Task: Discuss methods for coping with stress    Responsible User: Dolly Riley RD        Task: Provide education on when to seek professional counseling    Responsible User: Dolly Riley RD Elizabeth Swartout RD, LD, [facility]  Diabetes Education      Time Spent: 60minutes  Encounter Type: Individual    Any diabetes medication dose changes were made via the CDE Protocol per the patient's endocrinology provider. A copy of this encounter was shared with the provider.

## 2023-08-30 ENCOUNTER — LAB (OUTPATIENT)
Dept: LAB | Facility: CLINIC | Age: 38
End: 2023-08-30
Payer: COMMERCIAL

## 2023-08-30 DIAGNOSIS — E11.42 TYPE 2 DIABETES MELLITUS WITH DIABETIC POLYNEUROPATHY, WITH LONG-TERM CURRENT USE OF INSULIN (H): ICD-10-CM

## 2023-08-30 DIAGNOSIS — Z79.4 TYPE 2 DIABETES MELLITUS WITH DIABETIC POLYNEUROPATHY, WITH LONG-TERM CURRENT USE OF INSULIN (H): ICD-10-CM

## 2023-08-30 DIAGNOSIS — F25.0 SCHIZOAFFECTIVE DISORDER, BIPOLAR TYPE (H): ICD-10-CM

## 2023-08-30 DIAGNOSIS — N18.31 STAGE 3A CHRONIC KIDNEY DISEASE (H): ICD-10-CM

## 2023-08-30 LAB
ANION GAP SERPL CALCULATED.3IONS-SCNC: 8 MMOL/L (ref 7–15)
BASOPHILS # BLD AUTO: 0.1 10E3/UL (ref 0–0.2)
BASOPHILS NFR BLD AUTO: 1 %
BUN SERPL-MCNC: 28.6 MG/DL (ref 6–20)
CALCIUM SERPL-MCNC: 9.3 MG/DL (ref 8.6–10)
CHLORIDE SERPL-SCNC: 110 MMOL/L (ref 98–107)
CREAT SERPL-MCNC: 1.73 MG/DL (ref 0.51–0.95)
DEPRECATED HCO3 PLAS-SCNC: 22 MMOL/L (ref 22–29)
EOSINOPHIL # BLD AUTO: 0.3 10E3/UL (ref 0–0.7)
EOSINOPHIL NFR BLD AUTO: 2 %
ERYTHROCYTE [DISTWIDTH] IN BLOOD BY AUTOMATED COUNT: 14.6 % (ref 10–15)
FERRITIN SERPL-MCNC: 48 NG/ML (ref 6–175)
GFR SERPL CREATININE-BSD FRML MDRD: 38 ML/MIN/1.73M2
GLUCOSE SERPL-MCNC: 204 MG/DL (ref 70–99)
HCT VFR BLD AUTO: 36.1 % (ref 35–47)
HGB BLD-MCNC: 11.1 G/DL (ref 11.7–15.7)
HOLD SPECIMEN: NORMAL
IMM GRANULOCYTES # BLD: 0.1 10E3/UL
IMM GRANULOCYTES NFR BLD: 1 %
IRON BINDING CAPACITY (ROCHE): 286 UG/DL (ref 240–430)
IRON SATN MFR SERPL: 16 % (ref 15–46)
IRON SERPL-MCNC: 45 UG/DL (ref 37–145)
LYMPHOCYTES # BLD AUTO: 4.3 10E3/UL (ref 0.8–5.3)
LYMPHOCYTES NFR BLD AUTO: 31 %
MCH RBC QN AUTO: 29.8 PG (ref 26.5–33)
MCHC RBC AUTO-ENTMCNC: 30.7 G/DL (ref 31.5–36.5)
MCV RBC AUTO: 97 FL (ref 78–100)
MONOCYTES # BLD AUTO: 0.8 10E3/UL (ref 0–1.3)
MONOCYTES NFR BLD AUTO: 6 %
NEUTROPHILS # BLD AUTO: 8.2 10E3/UL (ref 1.6–8.3)
NEUTROPHILS NFR BLD AUTO: 59 %
PLATELET # BLD AUTO: 378 10E3/UL (ref 150–450)
POTASSIUM SERPL-SCNC: 5 MMOL/L (ref 3.4–5.3)
RBC # BLD AUTO: 3.73 10E6/UL (ref 3.8–5.2)
SODIUM SERPL-SCNC: 140 MMOL/L (ref 136–145)
WBC # BLD AUTO: 14 10E3/UL (ref 4–11)

## 2023-08-30 PROCEDURE — 85025 COMPLETE CBC W/AUTO DIFF WBC: CPT

## 2023-08-30 PROCEDURE — 36415 COLL VENOUS BLD VENIPUNCTURE: CPT

## 2023-08-30 PROCEDURE — 83540 ASSAY OF IRON: CPT

## 2023-08-30 PROCEDURE — 83550 IRON BINDING TEST: CPT

## 2023-08-30 PROCEDURE — 82728 ASSAY OF FERRITIN: CPT

## 2023-08-30 PROCEDURE — 83036 HEMOGLOBIN GLYCOSYLATED A1C: CPT

## 2023-08-30 PROCEDURE — 80048 BASIC METABOLIC PNL TOTAL CA: CPT

## 2023-08-31 ENCOUNTER — OFFICE VISIT (OUTPATIENT)
Dept: FAMILY MEDICINE | Facility: CLINIC | Age: 38
End: 2023-08-31
Payer: COMMERCIAL

## 2023-08-31 VITALS
DIASTOLIC BLOOD PRESSURE: 78 MMHG | BODY MASS INDEX: 38.16 KG/M2 | RESPIRATION RATE: 16 BRPM | OXYGEN SATURATION: 95 % | HEART RATE: 75 BPM | HEIGHT: 64 IN | SYSTOLIC BLOOD PRESSURE: 108 MMHG | WEIGHT: 223.5 LBS | TEMPERATURE: 98.6 F

## 2023-08-31 DIAGNOSIS — J45.21 MILD INTERMITTENT ASTHMA WITH ACUTE EXACERBATION: Chronic | ICD-10-CM

## 2023-08-31 DIAGNOSIS — N18.31 STAGE 3A CHRONIC KIDNEY DISEASE (H): ICD-10-CM

## 2023-08-31 DIAGNOSIS — E11.42 TYPE 2 DIABETES MELLITUS WITH DIABETIC POLYNEUROPATHY, WITH LONG-TERM CURRENT USE OF INSULIN (H): ICD-10-CM

## 2023-08-31 DIAGNOSIS — I10 ESSENTIAL HYPERTENSION: Chronic | ICD-10-CM

## 2023-08-31 DIAGNOSIS — G93.5 CHIARI MALFORMATION TYPE I (H): ICD-10-CM

## 2023-08-31 DIAGNOSIS — Z79.4 TYPE 2 DIABETES MELLITUS WITH DIABETIC POLYNEUROPATHY, WITH LONG-TERM CURRENT USE OF INSULIN (H): ICD-10-CM

## 2023-08-31 DIAGNOSIS — Z01.818 PREOP GENERAL PHYSICAL EXAM: Primary | ICD-10-CM

## 2023-08-31 LAB — HBA1C MFR BLD: 8.1 % (ref 0–5.6)

## 2023-08-31 PROCEDURE — 99214 OFFICE O/P EST MOD 30 MIN: CPT | Performed by: NURSE PRACTITIONER

## 2023-08-31 RX ORDER — INSULIN GLARGINE 100 [IU]/ML
32 INJECTION, SOLUTION SUBCUTANEOUS EVERY MORNING
Qty: 45 ML | Refills: 1 | Status: SHIPPED | DISCHARGE
Start: 2023-08-31 | End: 2023-12-05

## 2023-08-31 ASSESSMENT — PAIN SCALES - GENERAL: PAINLEVEL: NO PAIN (0)

## 2023-08-31 NOTE — PATIENT INSTRUCTIONS
Hold Novolog on the day of MRI    Do only 25 units of Lantus on the morning of MRI (down to 80% from your current dose)     You can not have contrast with MRI

## 2023-08-31 NOTE — H&P (VIEW-ONLY)
[facility]  [address] [address]  Phone: [phone]  Primary Provider: Franky Velasquez  Pre-op Performing Provider: FRANKY VELASQUEZ    PREOPERATIVE EVALUATION:  Today's date: 8/31/2023    Divina Delgadillo is a 38 year old female who presents for a preoperative evaluation.      8/31/2023     1:21 PM   Additional Questions   Roomed by og         8/31/2023     1:21 PM   Patient Reported Additional Medications   Patient reports taking the following new medications none       Surgical Information:  Surgery/Procedure: MRI   Surgery Location: [facility]   Surgeon: N/A  Surgery Date: 9/18/23  Time of Surgery: 7:45 AM   Where patient plans to recover: At home with family  Fax number for surgical facility: Note does not need to be faxed, will be available electronically in Epic.    Assessment & Plan     The proposed surgical procedure is considered INTERMEDIATE risk.    Preop general physical exam  -cleared for MRI without contrast under sedation     Chiari malformation type I (H)  -scheduled for MRI     Type 2 diabetes mellitus with diabetic polyneuropathy, with long-term current use of insulin (H)    - Hemoglobin A1c; Future  - insulin glargine (LANTUS SOLOSTAR) 100 UNIT/ML pen; Inject 32 Units Subcutaneous every morning    Mild intermittent asthma with acute exacerbation  -well controlled     Essential hypertension  -well controlled     Stage 3a chronic kidney disease (H)  -stable  -hyperkalemia resolved  -patient is scheduled for brain MRI without contrast, can not have IV contrast due to CKD        - No identified additional risk factors other than previously addressed    Antiplatelet or Anticoagulation Medication Instructions:   - Patient is on no antiplatelet or anticoagulation medications.    Additional Medication Instructions:  Patient is to take all scheduled medications on the day of surgery EXCEPT for modifications listed below:  -hold Novolog  while NPO  -reduce morning dose of Lantus on the day of MRI to 25 units since patient will be NPO     RECOMMENDATION:  APPROVAL GIVEN to proceed with proposed procedure, without further diagnostic evaluation.    Subjective       HPI related to upcoming procedure: scheduled for brain MRI for evaluation of headaches         8/31/2023     1:12 PM   Preop Questions   1. Have you ever had a heart attack or stroke? No   2. Have you ever had surgery on your heart or blood vessels, such as a stent placement, a coronary artery bypass, or surgery on an artery in your head, neck, heart, or legs? No   3. Do you have chest pain with activity? No   4. Do you have a history of  heart failure? No   5. Do you currently have a cold, bronchitis or symptoms of other infection? No   6. Do you have a cough, shortness of breath, or wheezing? No   7. Do you or anyone in your family have previous history of blood clots? No   8. Do you or does anyone in your family have a serious bleeding problem such as prolonged bleeding following surgeries or cuts? No   9. Have you ever had problems with anemia or been told to take iron pills? No   10. Have you had any abnormal blood loss such as black, tarry or bloody stools, or abnormal vaginal bleeding? No   11. Have you ever had a blood transfusion? No   12. Are you willing to have a blood transfusion if it is medically needed before, during, or after your surgery? Yes   13. Have you or any of your relatives ever had problems with anesthesia? No   14. Do you have sleep apnea, excessive snoring or daytime drowsiness? No   15. Do you have any artifical heart valves or other implanted medical devices like a pacemaker, defibrillator, or continuous glucose monitor? No   16. Do you have artificial joints? No   17. Are you allergic to latex? YES   18. Is there any chance that you may be pregnant? No     Health Care Directive:  Patient has a Health Care Directive on file    Preoperative Review of :    "reviewed - no record of controlled substances prescribed.      Status of Chronic Conditions:  See problem list for active medical problems.  Problems all longstanding and stable, except as noted/documented.  See ROS for pertinent symptoms related to these conditions.    Review of Systems  Constitutional, neuro, ENT, endocrine, pulmonary, cardiac, gastrointestinal, genitourinary, musculoskeletal, integument and psychiatric systems are negative, except as otherwise noted.    Patient Active Problem List    Diagnosis Date Noted    Chiari malformation type I (H) 06/12/2023     Priority: Medium    Ulnar neuropathy of both upper extremities 09/20/2022     Priority: Medium    Insomnia, unspecified type 08/30/2021     Priority: Medium    History of DVT of arm  (deep vein thrombosis) 01/23/2021     Priority: Medium     ultrasound 1/6/2017-  venous thrombus in the antecubital fossa region and the left forearm as described      Schizoaffective disorder, depressive type (H) 07/19/2019     Priority: Medium    Acquired asplenia 03/22/2019     Priority: Medium    Chronic leukocytosis 11/27/2018     Priority: Medium     Due to spleen removal      Nicotine abuse 08/13/2018     Priority: Medium    Uncontrolled type 2 diabetes mellitus with diabetic polyneuropathy, with long-term current use of insulin 01/24/2018     Priority: Medium    Mild intermittent asthma with acute exacerbation 01/16/2018     Priority: Medium    Morbid obesity (H) 01/09/2018     Priority: Medium    IUD (intrauterine device) in place 07/26/2017     Priority: Medium     Mirena IUD inserted in office with premed 7/26/2017        Cervical high risk HPV (human papillomavirus) test positive 06/20/2017     Priority: Medium     6/15/2017 Pap:NIL, +HR HPV \"other\" (not 16/18). Plan to repeat Pap+HPV in 1 year  6/14/2018 Pap: NIL/neg HPV. Plan Pap+HPV in 3 years (2021)  3/26/21 NIL pap, Neg HPV. Plan cotest in 3 years.       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/26/2015;      Type 2 diabetes mellitus with stage 3 chronic kidney disease, with long-term current use of insulin (H) 02/27/2017     Priority: Medium    Bipolar disorder (H) 02/27/2017     Priority: Medium    Orthostatic hypotension 01/10/2017     Priority: Medium    Tobacco abuse 01/10/2017     Priority: Medium    Long term (current) use of anticoagulants 01/09/2017     Priority: Medium    Stage 3 chronic kidney disease 12/03/2015     Priority: Medium     Overview:   Updated per 10/1/17 IMO import      Vitamin D insufficiency 06/01/2015     Priority: Medium    Cardiac murmur 08/20/2013     Priority: Medium    Depressive disorder 09/15/2012     Priority: Medium    Hyperlipidemia LDL goal <100 10/31/2010     Priority: Medium    Borderline personality disorder (H) 11/06/2009     Priority: Medium    Essential hypertension 06/13/2008     Priority: Medium    NAFL (nonalcoholic fatty liver) 04/11/2006     Priority: Medium     See flowsheet for hepatic panel.   Stopped zocor, was on godon as well had right upper quandrant ultrasound and ct  ? Psych med related vs SA[facility]NI      Esophageal reflux 04/14/2005     Priority: Medium     GERD      PTSD (post-traumatic stress disorder) 01/01/2001     Priority: Medium      Past Medical History:   Diagnosis Date    Acquired asplenia     Acute pain of left knee 03/22/2019    Acute-on-chronic kidney injury (H) 03/22/2019    ARF (acute renal failure) (H) 03/23/2019    Asthma     Bipolar disorder (H)     Cardiac murmur     Cervical high risk HPV (human papillomavirus) test positive 06/20/2017    Chiari malformation type I (H)     Chronic bilateral low back pain without sciatica     Deep venous thrombosis of arm (H) 01/06/2017    ultrasound 1/6/2017-  venous thrombus in the antecubital fossa region and the left forearm as described    Depressive disorder      DVT (deep venous thrombosis) (H)     DVT of upper extremity (deep vein thrombosis) (H) 2021    Esophageal reflux     GERD    Hypertension     Insomnia     Leukocytosis     Mild intermittent asthma     Morbid obesity (H)     Mucinous neoplasm of pancreas     NAFL (nonalcoholic fatty liver)     Neck pain     Nicotine abuse     Other specified types of schizophrenia, unspecified condition     Schizoaffective disorder, depressive type (H)     Stage 3a chronic kidney disease (CKD) (H)     Type 2 diabetes mellitus (H)     Ulnar neuropathy of both upper extremities     Vitamin D insufficiency      Past Surgical History:   Procedure Laterality Date    ADRENALECTOMY Left 2015    BIOPSY      BREAST SURGERY  2013    COLONOSCOPY      ENT SURGERY  10/01/2000    Tympanoplasty    IR LIVER BIOPSY PERCUTANEOUS  11/14/2019    PANCREATECTOMY PARTIAL  2015    PERCUTANEOUS BIOPSY LIVER Right 11/14/2019    Procedure: Percutaneous Liver Biopsy;  Surgeon: Sean Guzman PA-C;  Location: [facility] OR    SPLENECTOMY  2015    TONSILLECTOMY  10/01/2000    Tonsillectomy     Current Outpatient Medications   Medication Sig Dispense Refill    ACCU-CHEK GUIDE test strip Use to test blood sugar 3 times daily or as directed. 150 strip 1    albuterol (VENTOLIN HFA) 108 (90 Base) MCG/ACT inhaler INHALE 2 PUFFS INTO THE LUNGS EVERY SIX  HOURS AS NEEDED FOR SHORTNESS OF BREATH 18 g 10    ARIPiprazole (ABILIFY) 30 MG tablet Take 30 mg by mouth daily      atorvastatin (LIPITOR) 10 MG tablet Take 1 tablet (10 mg) by mouth daily 90 tablet 2    Biotin 5 MG TABS Take 1 tablet by mouth daily 30 tablet 5    blood glucose (ONETOUCH VERIO IQ) test strip Use to test blood sugar up to 3 times daily 100 strip 11    Blood Glucose Monitoring Suppl (ONETOUCH VERIO FLEX SYSTEM) w/Device KIT 1 each daily 1 kit 0    carvedilol (COREG) 12.5 MG tablet Take 1 tablet (12.5 mg) by mouth 2 times daily 180 tablet 3    cloZAPine (CLOZARIL) 100 MG tablet Take 50 mg by  mouth 2 times daily      Continuous Blood Gluc Sensor (DEXCOM G6 SENSOR) MISC 1 each every 10 days. Change every 10 days. USE TO READ BLOOD SUGARS PER  INSTRUCTIONS 12 each 1    Continuous Blood Gluc Sensor (GUARDIAN 4 GLUCOSE SENSOR) MISC 1 each every 7 days Change sensor every 7 days 15 each 4    Continuous Blood Gluc Transmit (DEXCOM G6 TRANSMITTER) MISC 1 each every 3 months Change every 3 months. USE TO READ BLOOD SUGARS PER  INSTRUCTIONS 1 each 1    FLUoxetine (PROZAC) 40 MG capsule Take 40 mg by mouth daily      hydrOXYzine (ATARAX) 25 MG tablet Take 1 Tablet (25 mg) by mouth 3 times daily if needed for Anxiety.      insulin aspart (NOVOLOG VIAL) 100 UNITS/ML vial For use with continuous insulin pump.  Max TDD 60. 60 mL 4    Insulin Aspart FlexPen 100 UNIT/ML SOPN Inject 1-14 Units Subcutaneous 3 times daily (with meals) 3 times daily (with meals) (Use Novlog on meal size small meal 15-30gm carb: 3 units, average meal 45-60gm carb: 5 units, large meal 60gm+ carb: 7 units + pre meal correction 1:40>150. Max dose per 24 hours is 50 units) 30 mL 1    insulin glargine (LANTUS SOLOSTAR) 100 UNIT/ML pen Inject 32 Units Subcutaneous every morning 45 mL 1    insulin pen needle (32G X 6 MM) 32G X 6 MM miscellaneous Use 4 pen needles daily. 150 each 10    insulin pen needle (ULTICARE MINI) 31G X 6 MM miscellaneous USE 1 PEN NEEDLE 4 times daily 200 each 10    lamoTRIgine (LAMICTAL) 100 MG tablet Take 100 mg by mouth At Bedtime      loratadine (CLARITIN) 10 MG tablet Take 1 tablet (10 mg) by mouth every morning 28 tablet 10    omeprazole (PRILOSEC) 20 MG DR capsule TAKE 1 CAPSULE BY MOUTH EVERY DAY 28 capsule 2    OneTouch Delica Lancets 33G MISC 1 each 4 times daily 100 each 11    QUEtiapine (SEROQUEL) 25 MG tablet Take 25 mg by mouth 1 tablet 4 times daily as needed for agitation or hallucinations      topiramate (TOPAMAX) 50 MG tablet Take 1 tablet (50 mg) by mouth 2 times daily 60 tablet 3     "traZODone (DESYREL) 50 MG tablet Take 1-2 Tablets ( mg) by mouth at bedtime if needed for Sleep.      acetaminophen (TYLENOL) 325 MG tablet Take 325-650 mg by mouth 2 tab every 4-6 hours as needed, do not exceed 9 tabs in 24hours (Patient not taking: Reported on 8/9/2023)      Biotin 5000 MCG CAPS Take 1 tablet by mouth daily at 2 pm (Patient not taking: Reported on 8/22/2023)      COMPOUNDED NON-CONTROLLED SUBSTANCE (CMPD RX) - PHARMACY TO MIX COMPOUNDED MEDICATION Chloramphenicol 50 mg, sulfamethoxazole 50 mg, amphotericin B 5 mg, hydrocortisone 1 mg. 2-3 puffs to affected ear PRN itching/drainage (Patient not taking: Reported on 7/13/2023) 10 capsule 1    Continuous Blood Gluc Transmit (GUARDIAN 4 TRANSMITTER) MISC 1 each every 7 days Change sensor every 7 days (Patient not taking: Reported on 8/9/2023) 12 each 4    fluocinolone acetonide 0.01 % OIL Place 4 drops in ear(s) 2 times daily as needed (ear itching) (Patient not taking: Reported on 7/13/2023) 20 mL 3    glucose (BD GLUCOSE) 4 g chewable tablet Take 1 tablet by mouth every hour as needed for low blood sugar (Patient not taking: Reported on 8/31/2023) 30 tablet 4    Insulin Infusion Pump (MINIMED 780G INSULIN PUMP) KIT 1 each daily SmartGuard Target: 100; Active Insulin Time: 2 hours (recommended); SmartGuardTM Warmup/Manual Mode: Suspend before low (recommended) (Patient not taking: Reported on 8/22/2023) 1 kit 0    Insulin Infusion Pump Supplies (EXTENDED INFUSION SET 23\"/6MM) MISC 1 each every 7 days Max Total Daily Dose 70 units; Change infusion set & reservoir every 7 days (recommended) (Patient not taking: Reported on 8/22/2023) 10 each 6    Insulin Infusion Pump Supplies (EXTENDED RESERVOIR 3ML) MISC 1 each every 7 days (Patient not taking: Reported on 8/22/2023) 10 each 11    loperamide (IMODIUM A-D) 2 MG tablet 2 caplets after loose stool then 1 after each loose stool do not exceed 4 in 24hrs (Patient not taking: Reported on 8/31/2023)   " "   Pyridoxine HCl (VITAMIN B6) 100 MG TABS Take 100 mg by mouth daily (Patient not taking: Reported on 2023)         Allergies   Allergen Reactions    Acetaminophen Other (See Comments)     pt previously tried to overdose on it, PMD does not want pt taking per pt.     Latex Rash        Social History     Tobacco Use    Smoking status: Former     Types: Vaping Device, Cigarettes     Quit date: 2023     Years since quittin.5     Passive exposure: Yes    Smokeless tobacco: Never    Tobacco comments:     A pack every 3 days   Substance Use Topics    Alcohol use: No       History   Drug Use No         Objective     /78   Pulse 75   Temp 98.6  F (37  C) (Tympanic)   Resp 16   Ht 1.626 m (5' 4\")   Wt 101.4 kg (223 lb 8 oz)   SpO2 95%   BMI 38.36 kg/m      Physical Exam    GENERAL APPEARANCE: healthy, alert and no distress     EYES: EOMI, PERRL     HENT: ear canals and TM's normal and nose and mouth without ulcers or lesions     NECK: no adenopathy, no asymmetry, masses, or scars and thyroid normal to palpation     RESP: lungs clear to auscultation - no rales, rhonchi or wheezes     CV: regular rates and rhythm and mild systolic murmur, unchanged since last exam      MS: extremities normal- no gross deformities noted, no evidence of inflammation in joints, FROM in all extremities.     SKIN: no suspicious lesions or rashes     NEURO: Normal strength and tone, sensory exam grossly normal, mentation intact and speech normal     PSYCH: mentation appears normal. and affect normal/bright     LYMPHATICS: No cervical adenopathy    Recent Labs   Lab Test 23  1022 23  1231 23  0836 23  1607 23  1308 23  1419 23  1449 23  1605 10/26/22  1345 10/19/22  0739   HGB 11.1* 11.0*  --  11.2*   < > 11.4*   < >  --    < > 11.4*     --   --  430   < > 394   < >  --    < > 375   INR  --   --   --   --   --   --   --   --   --  0.87    143  --  139  --  144  -- "  144   < > 136   POTASSIUM 5.0 4.8   < > 5.8*  --  4.5  --  4.9   < > 5.9*   CR 1.73* 1.78*  --  1.77*  --  1.70*  --  1.64*   < > 1.37*   A1C  --   --   --   --   --  8.3*  --  9.2*   < > 9.6*    < > = values in this interval not displayed.        Diagnostics:  Recent Results (from the past 48 hour(s))   Basic metabolic panel    Collection Time: 08/30/23 10:22 AM   Result Value Ref Range    Sodium 140 136 - 145 mmol/L    Potassium 5.0 3.4 - 5.3 mmol/L    Chloride 110 (H) 98 - 107 mmol/L    Carbon Dioxide (CO2) 22 22 - 29 mmol/L    Anion Gap 8 7 - 15 mmol/L    Urea Nitrogen 28.6 (H) 6.0 - 20.0 mg/dL    Creatinine 1.73 (H) 0.51 - 0.95 mg/dL    Calcium 9.3 8.6 - 10.0 mg/dL    Glucose 204 (H) 70 - 99 mg/dL    GFR Estimate 38 (L) >60 mL/min/1.73m2   Iron and iron binding capacity    Collection Time: 08/30/23 10:22 AM   Result Value Ref Range    Iron 45 37 - 145 ug/dL    Iron Binding Capacity 286 240 - 430 ug/dL    Iron Sat Index 16 15 - 46 %   Ferritin    Collection Time: 08/30/23 10:22 AM   Result Value Ref Range    Ferritin 48 6 - 175 ng/mL   CBC with platelets and differential    Collection Time: 08/30/23 10:22 AM   Result Value Ref Range    WBC Count 14.0 (H) 4.0 - 11.0 10e3/uL    RBC Count 3.73 (L) 3.80 - 5.20 10e6/uL    Hemoglobin 11.1 (L) 11.7 - 15.7 g/dL    Hematocrit 36.1 35.0 - 47.0 %    MCV 97 78 - 100 fL    MCH 29.8 26.5 - 33.0 pg    MCHC 30.7 (L) 31.5 - 36.5 g/dL    RDW 14.6 10.0 - 15.0 %    Platelet Count 378 150 - 450 10e3/uL    % Neutrophils 59 %    % Lymphocytes 31 %    % Monocytes 6 %    % Eosinophils 2 %    % Basophils 1 %    % Immature Granulocytes 1 %    Absolute Neutrophils 8.2 1.6 - 8.3 10e3/uL    Absolute Lymphocytes 4.3 0.8 - 5.3 10e3/uL    Absolute Monocytes 0.8 0.0 - 1.3 10e3/uL    Absolute Eosinophils 0.3 0.0 - 0.7 10e3/uL    Absolute Basophils 0.1 0.0 - 0.2 10e3/uL    Absolute Immature Granulocytes 0.1 <=0.4 10e3/uL   Extra Serum Separator Tube (SST)    Collection Time: 08/30/23 10:29 AM    Result Value Ref Range    Hold Specimen JIC       No EKG required, no history of coronary heart disease, significant arrhythmia, peripheral arterial disease or other structural heart disease.    Revised Cardiac Risk Index (RCRI):  The patient has the following serious cardiovascular risks for perioperative complications:   - Diabetes Mellitus (on Insulin) = 1 point     RCRI Interpretation: 1 point: Class II (low risk - 0.9% complication rate)         Signed Electronically by: VERONIQUE Spears CNP  Copy of this evaluation report is provided to requesting physician.

## 2023-08-31 NOTE — PROGRESS NOTES
[facility]  [address] [address]  Phone: [phone]  Primary Provider: Franky Velasquez  Pre-op Performing Provider: FRANKY VELASQUEZ    PREOPERATIVE EVALUATION:  Today's date: 8/31/2023    Divina Delgadillo is a 38 year old female who presents for a preoperative evaluation.      8/31/2023     1:21 PM   Additional Questions   Roomed by og         8/31/2023     1:21 PM   Patient Reported Additional Medications   Patient reports taking the following new medications none       Surgical Information:  Surgery/Procedure: MRI   Surgery Location: [facility]   Surgeon: N/A  Surgery Date: 9/18/23  Time of Surgery: 7:45 AM   Where patient plans to recover: At home with family  Fax number for surgical facility: Note does not need to be faxed, will be available electronically in Epic.    Assessment & Plan     The proposed surgical procedure is considered INTERMEDIATE risk.    Preop general physical exam  -cleared for MRI without contrast under sedation     Chiari malformation type I (H)  -scheduled for MRI     Type 2 diabetes mellitus with diabetic polyneuropathy, with long-term current use of insulin (H)    - Hemoglobin A1c; Future  - insulin glargine (LANTUS SOLOSTAR) 100 UNIT/ML pen; Inject 32 Units Subcutaneous every morning    Mild intermittent asthma with acute exacerbation  -well controlled     Essential hypertension  -well controlled     Stage 3a chronic kidney disease (H)  -stable  -hyperkalemia resolved  -patient is scheduled for brain MRI without contrast, can not have IV contrast due to CKD        - No identified additional risk factors other than previously addressed    Antiplatelet or Anticoagulation Medication Instructions:   - Patient is on no antiplatelet or anticoagulation medications.    Additional Medication Instructions:  Patient is to take all scheduled medications on the day of surgery EXCEPT for modifications listed below:  -hold Novolog  while NPO  -reduce morning dose of Lantus on the day of MRI to 25 units since patient will be NPO     RECOMMENDATION:  APPROVAL GIVEN to proceed with proposed procedure, without further diagnostic evaluation.    Subjective       HPI related to upcoming procedure: scheduled for brain MRI for evaluation of headaches         8/31/2023     1:12 PM   Preop Questions   1. Have you ever had a heart attack or stroke? No   2. Have you ever had surgery on your heart or blood vessels, such as a stent placement, a coronary artery bypass, or surgery on an artery in your head, neck, heart, or legs? No   3. Do you have chest pain with activity? No   4. Do you have a history of  heart failure? No   5. Do you currently have a cold, bronchitis or symptoms of other infection? No   6. Do you have a cough, shortness of breath, or wheezing? No   7. Do you or anyone in your family have previous history of blood clots? No   8. Do you or does anyone in your family have a serious bleeding problem such as prolonged bleeding following surgeries or cuts? No   9. Have you ever had problems with anemia or been told to take iron pills? No   10. Have you had any abnormal blood loss such as black, tarry or bloody stools, or abnormal vaginal bleeding? No   11. Have you ever had a blood transfusion? No   12. Are you willing to have a blood transfusion if it is medically needed before, during, or after your surgery? Yes   13. Have you or any of your relatives ever had problems with anesthesia? No   14. Do you have sleep apnea, excessive snoring or daytime drowsiness? No   15. Do you have any artifical heart valves or other implanted medical devices like a pacemaker, defibrillator, or continuous glucose monitor? No   16. Do you have artificial joints? No   17. Are you allergic to latex? YES   18. Is there any chance that you may be pregnant? No     Health Care Directive:  Patient has a Health Care Directive on file    Preoperative Review of :    "reviewed - no record of controlled substances prescribed.      Status of Chronic Conditions:  See problem list for active medical problems.  Problems all longstanding and stable, except as noted/documented.  See ROS for pertinent symptoms related to these conditions.    Review of Systems  Constitutional, neuro, ENT, endocrine, pulmonary, cardiac, gastrointestinal, genitourinary, musculoskeletal, integument and psychiatric systems are negative, except as otherwise noted.    Patient Active Problem List    Diagnosis Date Noted    Chiari malformation type I (H) 06/12/2023     Priority: Medium    Ulnar neuropathy of both upper extremities 09/20/2022     Priority: Medium    Insomnia, unspecified type 08/30/2021     Priority: Medium    History of DVT of arm  (deep vein thrombosis) 01/23/2021     Priority: Medium     ultrasound 1/6/2017-  venous thrombus in the antecubital fossa region and the left forearm as described      Schizoaffective disorder, depressive type (H) 07/19/2019     Priority: Medium    Acquired asplenia 03/22/2019     Priority: Medium    Chronic leukocytosis 11/27/2018     Priority: Medium     Due to spleen removal      Nicotine abuse 08/13/2018     Priority: Medium    Uncontrolled type 2 diabetes mellitus with diabetic polyneuropathy, with long-term current use of insulin 01/24/2018     Priority: Medium    Mild intermittent asthma with acute exacerbation 01/16/2018     Priority: Medium    Morbid obesity (H) 01/09/2018     Priority: Medium    IUD (intrauterine device) in place 07/26/2017     Priority: Medium     Mirena IUD inserted in office with premed 7/26/2017        Cervical high risk HPV (human papillomavirus) test positive 06/20/2017     Priority: Medium     6/15/2017 Pap:NIL, +HR HPV \"other\" (not 16/18). Plan to repeat Pap+HPV in 1 year  6/14/2018 Pap: NIL/neg HPV. Plan Pap+HPV in 3 years (2021)  3/26/21 NIL pap, Neg HPV. Plan cotest in 3 years.       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/26/2015;      Type 2 diabetes mellitus with stage 3 chronic kidney disease, with long-term current use of insulin (H) 02/27/2017     Priority: Medium    Bipolar disorder (H) 02/27/2017     Priority: Medium    Orthostatic hypotension 01/10/2017     Priority: Medium    Tobacco abuse 01/10/2017     Priority: Medium    Long term (current) use of anticoagulants 01/09/2017     Priority: Medium    Stage 3 chronic kidney disease 12/03/2015     Priority: Medium     Overview:   Updated per 10/1/17 IMO import      Vitamin D insufficiency 06/01/2015     Priority: Medium    Cardiac murmur 08/20/2013     Priority: Medium    Depressive disorder 09/15/2012     Priority: Medium    Hyperlipidemia LDL goal <100 10/31/2010     Priority: Medium    Borderline personality disorder (H) 11/06/2009     Priority: Medium    Essential hypertension 06/13/2008     Priority: Medium    NAFL (nonalcoholic fatty liver) 04/11/2006     Priority: Medium     See flowsheet for hepatic panel.   Stopped zocor, was on godon as well had right upper quandrant ultrasound and ct  ? Psych med related vs [facility]      Esophageal reflux 04/14/2005     Priority: Medium     GERD      PTSD (post-traumatic stress disorder) 01/01/2001     Priority: Medium      Past Medical History:   Diagnosis Date    Acquired asplenia     Acute pain of left knee 03/22/2019    Acute-on-chronic kidney injury (H) 03/22/2019    ARF (acute renal failure) (H) 03/23/2019    Asthma     Bipolar disorder (H)     Cardiac murmur     Cervical high risk HPV (human papillomavirus) test positive 06/20/2017    Chiari malformation type I (H)     Chronic bilateral low back pain without sciatica     Deep venous thrombosis of arm (H) 01/06/2017    ultrasound 1/6/2017-  venous thrombus in the antecubital fossa region and the left forearm as described    Depressive disorder      DVT (deep venous thrombosis) (H)     DVT of upper extremity (deep vein thrombosis) (H) 2021    Esophageal reflux     GERD    Hypertension     Insomnia     Leukocytosis     Mild intermittent asthma     Morbid obesity (H)     Mucinous neoplasm of pancreas     NAFL (nonalcoholic fatty liver)     Neck pain     Nicotine abuse     Other specified types of schizophrenia, unspecified condition     Schizoaffective disorder, depressive type (H)     Stage 3a chronic kidney disease (CKD) (H)     Type 2 diabetes mellitus (H)     Ulnar neuropathy of both upper extremities     Vitamin D insufficiency      Past Surgical History:   Procedure Laterality Date    ADRENALECTOMY Left 2015    BIOPSY      BREAST SURGERY  2013    COLONOSCOPY      ENT SURGERY  10/01/2000    Tympanoplasty    IR LIVER BIOPSY PERCUTANEOUS  11/14/2019    PANCREATECTOMY PARTIAL  2015    PERCUTANEOUS BIOPSY LIVER Right 11/14/2019    Procedure: Percutaneous Liver Biopsy;  Surgeon: Sean Guzman PA-C;  Location: [facility] OR    SPLENECTOMY  2015    TONSILLECTOMY  10/01/2000    Tonsillectomy     Current Outpatient Medications   Medication Sig Dispense Refill    ACCU-CHEK GUIDE test strip Use to test blood sugar 3 times daily or as directed. 150 strip 1    albuterol (VENTOLIN HFA) 108 (90 Base) MCG/ACT inhaler INHALE 2 PUFFS INTO THE LUNGS EVERY SIX  HOURS AS NEEDED FOR SHORTNESS OF BREATH 18 g 10    ARIPiprazole (ABILIFY) 30 MG tablet Take 30 mg by mouth daily      atorvastatin (LIPITOR) 10 MG tablet Take 1 tablet (10 mg) by mouth daily 90 tablet 2    Biotin 5 MG TABS Take 1 tablet by mouth daily 30 tablet 5    blood glucose (ONETOUCH VERIO IQ) test strip Use to test blood sugar up to 3 times daily 100 strip 11    Blood Glucose Monitoring Suppl (ONETOUCH VERIO FLEX SYSTEM) w/Device KIT 1 each daily 1 kit 0    carvedilol (COREG) 12.5 MG tablet Take 1 tablet (12.5 mg) by mouth 2 times daily 180 tablet 3    cloZAPine (CLOZARIL) 100 MG tablet Take 50 mg by  mouth 2 times daily      Continuous Blood Gluc Sensor (DEXCOM G6 SENSOR) MISC 1 each every 10 days. Change every 10 days. USE TO READ BLOOD SUGARS PER  INSTRUCTIONS 12 each 1    Continuous Blood Gluc Sensor (GUARDIAN 4 GLUCOSE SENSOR) MISC 1 each every 7 days Change sensor every 7 days 15 each 4    Continuous Blood Gluc Transmit (DEXCOM G6 TRANSMITTER) MISC 1 each every 3 months Change every 3 months. USE TO READ BLOOD SUGARS PER  INSTRUCTIONS 1 each 1    FLUoxetine (PROZAC) 40 MG capsule Take 40 mg by mouth daily      hydrOXYzine (ATARAX) 25 MG tablet Take 1 Tablet (25 mg) by mouth 3 times daily if needed for Anxiety.      insulin aspart (NOVOLOG VIAL) 100 UNITS/ML vial For use with continuous insulin pump.  Max TDD 60. 60 mL 4    Insulin Aspart FlexPen 100 UNIT/ML SOPN Inject 1-14 Units Subcutaneous 3 times daily (with meals) 3 times daily (with meals) (Use Novlog on meal size small meal 15-30gm carb: 3 units, average meal 45-60gm carb: 5 units, large meal 60gm+ carb: 7 units + pre meal correction 1:40>150. Max dose per 24 hours is 50 units) 30 mL 1    insulin glargine (LANTUS SOLOSTAR) 100 UNIT/ML pen Inject 32 Units Subcutaneous every morning 45 mL 1    insulin pen needle (32G X 6 MM) 32G X 6 MM miscellaneous Use 4 pen needles daily. 150 each 10    insulin pen needle (ULTICARE MINI) 31G X 6 MM miscellaneous USE 1 PEN NEEDLE 4 times daily 200 each 10    lamoTRIgine (LAMICTAL) 100 MG tablet Take 100 mg by mouth At Bedtime      loratadine (CLARITIN) 10 MG tablet Take 1 tablet (10 mg) by mouth every morning 28 tablet 10    omeprazole (PRILOSEC) 20 MG DR capsule TAKE 1 CAPSULE BY MOUTH EVERY DAY 28 capsule 2    OneTouch Delica Lancets 33G MISC 1 each 4 times daily 100 each 11    QUEtiapine (SEROQUEL) 25 MG tablet Take 25 mg by mouth 1 tablet 4 times daily as needed for agitation or hallucinations      topiramate (TOPAMAX) 50 MG tablet Take 1 tablet (50 mg) by mouth 2 times daily 60 tablet 3     "traZODone (DESYREL) 50 MG tablet Take 1-2 Tablets ( mg) by mouth at bedtime if needed for Sleep.      acetaminophen (TYLENOL) 325 MG tablet Take 325-650 mg by mouth 2 tab every 4-6 hours as needed, do not exceed 9 tabs in 24hours (Patient not taking: Reported on 8/9/2023)      Biotin 5000 MCG CAPS Take 1 tablet by mouth daily at 2 pm (Patient not taking: Reported on 8/22/2023)      COMPOUNDED NON-CONTROLLED SUBSTANCE (CMPD RX) - PHARMACY TO MIX COMPOUNDED MEDICATION Chloramphenicol 50 mg, sulfamethoxazole 50 mg, amphotericin B 5 mg, hydrocortisone 1 mg. 2-3 puffs to affected ear PRN itching/drainage (Patient not taking: Reported on 7/13/2023) 10 capsule 1    Continuous Blood Gluc Transmit (GUARDIAN 4 TRANSMITTER) MISC 1 each every 7 days Change sensor every 7 days (Patient not taking: Reported on 8/9/2023) 12 each 4    fluocinolone acetonide 0.01 % OIL Place 4 drops in ear(s) 2 times daily as needed (ear itching) (Patient not taking: Reported on 7/13/2023) 20 mL 3    glucose (BD GLUCOSE) 4 g chewable tablet Take 1 tablet by mouth every hour as needed for low blood sugar (Patient not taking: Reported on 8/31/2023) 30 tablet 4    Insulin Infusion Pump (MINIMED 780G INSULIN PUMP) KIT 1 each daily SmartGuard Target: 100; Active Insulin Time: 2 hours (recommended); SmartGuardTM Warmup/Manual Mode: Suspend before low (recommended) (Patient not taking: Reported on 8/22/2023) 1 kit 0    Insulin Infusion Pump Supplies (EXTENDED INFUSION SET 23\"/6MM) MISC 1 each every 7 days Max Total Daily Dose 70 units; Change infusion set & reservoir every 7 days (recommended) (Patient not taking: Reported on 8/22/2023) 10 each 6    Insulin Infusion Pump Supplies (EXTENDED RESERVOIR 3ML) MISC 1 each every 7 days (Patient not taking: Reported on 8/22/2023) 10 each 11    loperamide (IMODIUM A-D) 2 MG tablet 2 caplets after loose stool then 1 after each loose stool do not exceed 4 in 24hrs (Patient not taking: Reported on 8/31/2023)   " "   Pyridoxine HCl (VITAMIN B6) 100 MG TABS Take 100 mg by mouth daily (Patient not taking: Reported on 2023)         Allergies   Allergen Reactions    Acetaminophen Other (See Comments)     pt previously tried to overdose on it, PMD does not want pt taking per pt.     Latex Rash        Social History     Tobacco Use    Smoking status: Former     Types: Vaping Device, Cigarettes     Quit date: 2023     Years since quittin.5     Passive exposure: Yes    Smokeless tobacco: Never    Tobacco comments:     A pack every 3 days   Substance Use Topics    Alcohol use: No       History   Drug Use No         Objective     /78   Pulse 75   Temp 98.6  F (37  C) (Tympanic)   Resp 16   Ht 1.626 m (5' 4\")   Wt 101.4 kg (223 lb 8 oz)   SpO2 95%   BMI 38.36 kg/m      Physical Exam    GENERAL APPEARANCE: healthy, alert and no distress     EYES: EOMI, PERRL     HENT: ear canals and TM's normal and nose and mouth without ulcers or lesions     NECK: no adenopathy, no asymmetry, masses, or scars and thyroid normal to palpation     RESP: lungs clear to auscultation - no rales, rhonchi or wheezes     CV: regular rates and rhythm and mild systolic murmur, unchanged since last exam      MS: extremities normal- no gross deformities noted, no evidence of inflammation in joints, FROM in all extremities.     SKIN: no suspicious lesions or rashes     NEURO: Normal strength and tone, sensory exam grossly normal, mentation intact and speech normal     PSYCH: mentation appears normal. and affect normal/bright     LYMPHATICS: No cervical adenopathy    Recent Labs   Lab Test 23  1022 23  1231 23  0836 23  1607 23  1308 23  1419 23  1449 23  1605 10/26/22  1345 10/19/22  0739   HGB 11.1* 11.0*  --  11.2*   < > 11.4*   < >  --    < > 11.4*     --   --  430   < > 394   < >  --    < > 375   INR  --   --   --   --   --   --   --   --   --  0.87    143  --  139  --  144  -- "  144   < > 136   POTASSIUM 5.0 4.8   < > 5.8*  --  4.5  --  4.9   < > 5.9*   CR 1.73* 1.78*  --  1.77*  --  1.70*  --  1.64*   < > 1.37*   A1C  --   --   --   --   --  8.3*  --  9.2*   < > 9.6*    < > = values in this interval not displayed.        Diagnostics:  Recent Results (from the past 48 hour(s))   Basic metabolic panel    Collection Time: 08/30/23 10:22 AM   Result Value Ref Range    Sodium 140 136 - 145 mmol/L    Potassium 5.0 3.4 - 5.3 mmol/L    Chloride 110 (H) 98 - 107 mmol/L    Carbon Dioxide (CO2) 22 22 - 29 mmol/L    Anion Gap 8 7 - 15 mmol/L    Urea Nitrogen 28.6 (H) 6.0 - 20.0 mg/dL    Creatinine 1.73 (H) 0.51 - 0.95 mg/dL    Calcium 9.3 8.6 - 10.0 mg/dL    Glucose 204 (H) 70 - 99 mg/dL    GFR Estimate 38 (L) >60 mL/min/1.73m2   Iron and iron binding capacity    Collection Time: 08/30/23 10:22 AM   Result Value Ref Range    Iron 45 37 - 145 ug/dL    Iron Binding Capacity 286 240 - 430 ug/dL    Iron Sat Index 16 15 - 46 %   Ferritin    Collection Time: 08/30/23 10:22 AM   Result Value Ref Range    Ferritin 48 6 - 175 ng/mL   CBC with platelets and differential    Collection Time: 08/30/23 10:22 AM   Result Value Ref Range    WBC Count 14.0 (H) 4.0 - 11.0 10e3/uL    RBC Count 3.73 (L) 3.80 - 5.20 10e6/uL    Hemoglobin 11.1 (L) 11.7 - 15.7 g/dL    Hematocrit 36.1 35.0 - 47.0 %    MCV 97 78 - 100 fL    MCH 29.8 26.5 - 33.0 pg    MCHC 30.7 (L) 31.5 - 36.5 g/dL    RDW 14.6 10.0 - 15.0 %    Platelet Count 378 150 - 450 10e3/uL    % Neutrophils 59 %    % Lymphocytes 31 %    % Monocytes 6 %    % Eosinophils 2 %    % Basophils 1 %    % Immature Granulocytes 1 %    Absolute Neutrophils 8.2 1.6 - 8.3 10e3/uL    Absolute Lymphocytes 4.3 0.8 - 5.3 10e3/uL    Absolute Monocytes 0.8 0.0 - 1.3 10e3/uL    Absolute Eosinophils 0.3 0.0 - 0.7 10e3/uL    Absolute Basophils 0.1 0.0 - 0.2 10e3/uL    Absolute Immature Granulocytes 0.1 <=0.4 10e3/uL   Extra Serum Separator Tube (SST)    Collection Time: 08/30/23 10:29 AM    Result Value Ref Range    Hold Specimen JIC       No EKG required, no history of coronary heart disease, significant arrhythmia, peripheral arterial disease or other structural heart disease.    Revised Cardiac Risk Index (RCRI):  The patient has the following serious cardiovascular risks for perioperative complications:   - Diabetes Mellitus (on Insulin) = 1 point     RCRI Interpretation: 1 point: Class II (low risk - 0.9% complication rate)         Signed Electronically by: VERONIQUE Spears CNP  Copy of this evaluation report is provided to requesting physician.

## 2023-09-04 DIAGNOSIS — N17.9 AKI (ACUTE KIDNEY INJURY) (H): Primary | ICD-10-CM

## 2023-09-11 ENCOUNTER — MEDICAL CORRESPONDENCE (OUTPATIENT)
Dept: HEALTH INFORMATION MANAGEMENT | Facility: CLINIC | Age: 38
End: 2023-09-11
Payer: COMMERCIAL

## 2023-09-12 ENCOUNTER — TELEPHONE (OUTPATIENT)
Dept: NEPHROLOGY | Facility: CLINIC | Age: 38
End: 2023-09-12
Payer: COMMERCIAL

## 2023-09-12 NOTE — TELEPHONE ENCOUNTER
Called and spoke with pt.  Read back Dr. Dias's result note.  Pt stated she did read the message recently and agreed with plan to have lab done this week.  Encouraged pt to call if further questions or concerns.  Dionne Fuller LPN      Epic system notification pt has not read Result note message from Dr. Dias:     Divina, Your creatinine unfortunately did not improve as much as I had hoped with holding the lisinopril. Do you feel you are hydrated with this testing? Would you be willing to get an additional lab check this week for another time point?     I will place the lab order and it can be done at the [facility] closest to you.  Sánchez Dias DO   Written by Sánchez Dias DO on 9/4/2023  5:48 PM CDT    Component      Latest Ref Rng 8/30/2023  10:22 AM   Sodium      136 - 145 mmol/L 140    Potassium      3.4 - 5.3 mmol/L 5.0    Chloride      98 - 107 mmol/L 110 (H)    Carbon Dioxide (CO2)      22 - 29 mmol/L 22    Anion Gap      7 - 15 mmol/L 8    Urea Nitrogen      6.0 - 20.0 mg/dL 28.6 (H)    Creatinine      0.51 - 0.95 mg/dL 1.73 (H)    Calcium      8.6 - 10.0 mg/dL 9.3    Glucose      70 - 99 mg/dL 204 (H)    GFR Estimate      >60 mL/min/1.73m2 38 (L)    Iron      37 - 145 ug/dL 45    Iron Binding Capacity      240 - 430 ug/dL 286    Iron Sat Index      15 - 46 % 16    Ferritin      6 - 175 ng/mL 48       Legend:  (H) High  (L) Low

## 2023-09-13 ENCOUNTER — TELEPHONE (OUTPATIENT)
Dept: ENDOCRINOLOGY | Facility: CLINIC | Age: 38
End: 2023-09-13

## 2023-09-13 ENCOUNTER — MYC MEDICAL ADVICE (OUTPATIENT)
Dept: ENDOCRINOLOGY | Facility: CLINIC | Age: 38
End: 2023-09-13

## 2023-09-13 ENCOUNTER — LAB (OUTPATIENT)
Dept: LAB | Facility: CLINIC | Age: 38
End: 2023-09-13
Payer: COMMERCIAL

## 2023-09-13 DIAGNOSIS — N17.9 AKI (ACUTE KIDNEY INJURY) (H): ICD-10-CM

## 2023-09-13 DIAGNOSIS — Z79.4 TYPE 2 DIABETES MELLITUS WITH HYPERGLYCEMIA, WITH LONG-TERM CURRENT USE OF INSULIN (H): ICD-10-CM

## 2023-09-13 DIAGNOSIS — Z79.4 TYPE 2 DIABETES MELLITUS WITH DIABETIC POLYNEUROPATHY, WITH LONG-TERM CURRENT USE OF INSULIN (H): ICD-10-CM

## 2023-09-13 DIAGNOSIS — E11.42 TYPE 2 DIABETES MELLITUS WITH DIABETIC POLYNEUROPATHY, WITH LONG-TERM CURRENT USE OF INSULIN (H): ICD-10-CM

## 2023-09-13 DIAGNOSIS — E11.65 TYPE 2 DIABETES MELLITUS WITH HYPERGLYCEMIA, WITH LONG-TERM CURRENT USE OF INSULIN (H): ICD-10-CM

## 2023-09-13 DIAGNOSIS — N18.30 STAGE 3 CHRONIC KIDNEY DISEASE (H): ICD-10-CM

## 2023-09-13 DIAGNOSIS — E87.5 HYPERKALEMIA: ICD-10-CM

## 2023-09-13 DIAGNOSIS — Z79.4 TYPE 2 DIABETES MELLITUS WITH STAGE 3A CHRONIC KIDNEY DISEASE, WITH LONG-TERM CURRENT USE OF INSULIN (H): ICD-10-CM

## 2023-09-13 DIAGNOSIS — E11.65 UNCONTROLLED TYPE 2 DIABETES MELLITUS WITH HYPERGLYCEMIA (H): Primary | ICD-10-CM

## 2023-09-13 DIAGNOSIS — N18.31 TYPE 2 DIABETES MELLITUS WITH STAGE 3A CHRONIC KIDNEY DISEASE, WITH LONG-TERM CURRENT USE OF INSULIN (H): ICD-10-CM

## 2023-09-13 DIAGNOSIS — E11.65 UNCONTROLLED TYPE 2 DIABETES MELLITUS WITH HYPERGLYCEMIA (H): ICD-10-CM

## 2023-09-13 DIAGNOSIS — E11.22 TYPE 2 DIABETES MELLITUS WITH STAGE 3A CHRONIC KIDNEY DISEASE, WITH LONG-TERM CURRENT USE OF INSULIN (H): ICD-10-CM

## 2023-09-13 LAB
ALBUMIN MFR UR ELPH: 12.9 MG/DL
ALBUMIN UR-MCNC: NEGATIVE MG/DL
ANION GAP SERPL CALCULATED.3IONS-SCNC: 10 MMOL/L (ref 7–15)
APPEARANCE UR: CLEAR
BACTERIA #/AREA URNS HPF: ABNORMAL /HPF
BILIRUB UR QL STRIP: NEGATIVE
BUN SERPL-MCNC: 21.4 MG/DL (ref 6–20)
CALCIUM SERPL-MCNC: 9.4 MG/DL (ref 8.6–10)
CHLORIDE SERPL-SCNC: 107 MMOL/L (ref 98–107)
COLOR UR AUTO: YELLOW
CREAT SERPL-MCNC: 1.41 MG/DL (ref 0.51–0.95)
CREAT UR-MCNC: 157.7 MG/DL
DEPRECATED HCO3 PLAS-SCNC: 22 MMOL/L (ref 22–29)
EGFRCR SERPLBLD CKD-EPI 2021: 49 ML/MIN/1.73M2
GLUCOSE SERPL-MCNC: 246 MG/DL (ref 70–99)
GLUCOSE UR STRIP-MCNC: NEGATIVE MG/DL
HBA1C MFR BLD: 8.3 % (ref 0–5.6)
HGB UR QL STRIP: NEGATIVE
KETONES UR STRIP-MCNC: NEGATIVE MG/DL
LEUKOCYTE ESTERASE UR QL STRIP: ABNORMAL
NITRATE UR QL: NEGATIVE
PH UR STRIP: 6 [PH] (ref 5–7)
POTASSIUM SERPL-SCNC: 4.9 MMOL/L (ref 3.4–5.3)
PROT/CREAT 24H UR: 0.08 MG/MG CR (ref 0–0.2)
RBC #/AREA URNS AUTO: ABNORMAL /HPF
SODIUM SERPL-SCNC: 139 MMOL/L (ref 136–145)
SP GR UR STRIP: 1.02 (ref 1–1.03)
SQUAMOUS #/AREA URNS AUTO: ABNORMAL /LPF
UROBILINOGEN UR STRIP-ACNC: 0.2 E.U./DL
WBC #/AREA URNS AUTO: ABNORMAL /HPF

## 2023-09-13 PROCEDURE — 86341 ISLET CELL ANTIBODY: CPT | Mod: 90

## 2023-09-13 PROCEDURE — 82985 ASSAY OF GLYCATED PROTEIN: CPT | Mod: 90

## 2023-09-13 PROCEDURE — 99000 SPECIMEN HANDLING OFFICE-LAB: CPT

## 2023-09-13 PROCEDURE — 83036 HEMOGLOBIN GLYCOSYLATED A1C: CPT

## 2023-09-13 PROCEDURE — 80048 BASIC METABOLIC PNL TOTAL CA: CPT

## 2023-09-13 PROCEDURE — 84156 ASSAY OF PROTEIN URINE: CPT

## 2023-09-13 PROCEDURE — 36415 COLL VENOUS BLD VENIPUNCTURE: CPT

## 2023-09-13 PROCEDURE — 81001 URINALYSIS AUTO W/SCOPE: CPT

## 2023-09-13 NOTE — TELEPHONE ENCOUNTER
Contour Next Strips not covered. Preferred One touch. Would you like to change to preferred or PA?

## 2023-09-15 ENCOUNTER — TELEPHONE (OUTPATIENT)
Dept: INTERVENTIONAL RADIOLOGY/VASCULAR | Facility: CLINIC | Age: 38
End: 2023-09-15
Payer: COMMERCIAL

## 2023-09-15 LAB
FRUCTOSAMINE SERPL-SCNC: 310 UMOL/L
GAD65 AB SER IA-ACNC: <5 IU/ML

## 2023-09-15 NOTE — TELEPHONE ENCOUNTER
Follow up. Contour Next Strips are not . Preferred is One Touch. Would you like to change to preferred or PA?

## 2023-09-16 LAB
PANC ISLET CELL AB TITR SER: NORMAL {TITER}
ZNT8 AB SERPL IA-ACNC: <10 U/ML

## 2023-09-17 LAB — ISLET CELL512 AB SER IA-ACNC: <5.4 U/ML

## 2023-09-18 ENCOUNTER — HOSPITAL ENCOUNTER (OUTPATIENT)
Dept: MRI IMAGING | Facility: HOSPITAL | Age: 38
Discharge: HOME | End: 2023-09-18
Attending: NURSE PRACTITIONER | Admitting: NURSE PRACTITIONER
Payer: COMMERCIAL

## 2023-09-18 VITALS
OXYGEN SATURATION: 97 % | TEMPERATURE: 97.8 F | HEART RATE: 59 BPM | DIASTOLIC BLOOD PRESSURE: 62 MMHG | SYSTOLIC BLOOD PRESSURE: 131 MMHG | RESPIRATION RATE: 16 BRPM

## 2023-09-18 DIAGNOSIS — G93.5 CHIARI MALFORMATION TYPE I (H): ICD-10-CM

## 2023-09-18 LAB
GLUCOSE BLDC GLUCOMTR-MCNC: 121 MG/DL (ref 70–99)
GLUCOSE BLDC GLUCOMTR-MCNC: 133 MG/DL (ref 70–99)

## 2023-09-18 PROCEDURE — 250N000011 HC RX IP 250 OP 636: Mod: JZ | Performed by: RADIOLOGY

## 2023-09-18 PROCEDURE — 99152 MOD SED SAME PHYS/QHP 5/>YRS: CPT

## 2023-09-18 PROCEDURE — 82962 GLUCOSE BLOOD TEST: CPT

## 2023-09-18 RX ORDER — NALOXONE HYDROCHLORIDE 0.4 MG/ML
0.2 INJECTION, SOLUTION INTRAMUSCULAR; INTRAVENOUS; SUBCUTANEOUS
Status: DISCONTINUED | OUTPATIENT
Start: 2023-09-18 | End: 2023-09-19 | Stop reason: HOSPADM

## 2023-09-18 RX ORDER — NALOXONE HYDROCHLORIDE 0.4 MG/ML
0.4 INJECTION, SOLUTION INTRAMUSCULAR; INTRAVENOUS; SUBCUTANEOUS
Status: DISCONTINUED | OUTPATIENT
Start: 2023-09-18 | End: 2023-09-19 | Stop reason: HOSPADM

## 2023-09-18 RX ORDER — FLUMAZENIL 0.1 MG/ML
0.2 INJECTION, SOLUTION INTRAVENOUS
Status: DISCONTINUED | OUTPATIENT
Start: 2023-09-18 | End: 2023-09-19 | Stop reason: HOSPADM

## 2023-09-18 RX ORDER — FENTANYL CITRATE 50 UG/ML
25-50 INJECTION, SOLUTION INTRAMUSCULAR; INTRAVENOUS EVERY 5 MIN PRN
Status: DISCONTINUED | OUTPATIENT
Start: 2023-09-18 | End: 2023-09-19 | Stop reason: HOSPADM

## 2023-09-18 RX ORDER — DIAZEPAM 5 MG
5 TABLET ORAL EVERY 30 MIN PRN
Status: DISCONTINUED | OUTPATIENT
Start: 2023-09-18 | End: 2023-09-19 | Stop reason: HOSPADM

## 2023-09-18 RX ADMIN — FENTANYL CITRATE 50 MCG: 50 INJECTION, SOLUTION INTRAMUSCULAR; INTRAVENOUS at 09:27

## 2023-09-18 RX ADMIN — MIDAZOLAM HYDROCHLORIDE 1.5 MG: 1 INJECTION, SOLUTION INTRAMUSCULAR; INTRAVENOUS at 09:28

## 2023-09-18 NOTE — DISCHARGE INSTRUCTIONS
* Recovery After Conscious Sedation (Adult)  We gave you medicine by vein to make you sleepy or relaxed during your procedure. This may have included both a pain medicine and sleeping medicine. Most of the effects have worn off. But you may still feel sleepy for the next 6 to 8 hours.  Home care  Follow these guidelines when you get home:  You may feel sleepy and clumsy and have poor balance for the next few hours.  A responsible adult should stay with you for the next 8 hours. This person should make sure your condition doesn t get worse.  Don't drink any alcohol for the next 24 hours.  Don't drive, operate dangerous machinery, make important business or personal decisions or sign legal documents during the next 24 hours.  You may vomit (throw up) if you eat too soon after the procedure. If this happens, drink small amounts of water, juice or clear broth. Wait to try solid food until you no longer have nausea (upset stomach).  Note: Your care team may tell you not to take any medicine by mouth for pain or sleep in the next 4 hours. These medicines may react with the medicines you had in the hospital. This could cause a much stronger response than usual.  Follow-up care  Follow up with your care team if you are not alert and back to your usual level of activity within 12 hours.  When to seek medical advice  Call your care team right away if any of these occur:  You still feel sleepy or clumsy after 12 hours, or your sleepiness gets worse  Weakness or dizziness gets worse  Repeated vomiting  If you can't be woken up and someone is staying with you, they should call 911.  For informational purposes only. Not to replace the advice of your health care provider.  Copyright   2018 [employer]. All rights reserved.

## 2023-09-18 NOTE — INTERVAL H&P NOTE
"I have reviewed the surgical (or preoperative) H&P that is linked to this encounter, and examined the patient. There are no significant changes    Clinical Conditions Present on Arrival:  Clinically Significant Risk Factors Present on Admission                  # DMII: A1C = 8.3 % (Ref range: 0.0 - 5.6 %) within past 6 months  # Obesity: Estimated body mass index is 38.36 kg/m  as calculated from the following:    Height as of 8/31/23: 1.626 m (5' 4\").    Weight as of 8/31/23: 101.4 kg (223 lb 8 oz).       "

## 2023-09-18 NOTE — PROGRESS NOTES
Reviewed discharge instructions with pt and friend. Verbalized understanding. Pt assisted to front door via W/C to meet ride home.

## 2023-09-18 NOTE — PRE-PROCEDURE
GENERAL PRE-PROCEDURE:   Procedure:  MRI with moderate sedation  Date/Time:  9/18/2023 8:00 AM    Verbal consent obtained?: Yes    Risks and benefits: Risks, benefits and alternatives were discussed    Consent given by:  Guardian  Patient states understanding of procedure being performed: Yes    Patient's understanding of procedure matches consent: Yes    Procedure consent matches procedure scheduled: Yes    Expected level of sedation:  Moderate  Appropriately NPO:  Yes  ASA Class:  2  Mallampati  :  Grade 2- soft palate, base of uvula, tonsillar pillars, and portion of posterior pharyngeal wall visible  Lungs:  Lungs clear with good breath sounds bilaterally  Heart:  Normal heart sounds and rate  History & Physical reviewed:  History and physical reviewed and no updates needed  Statement of review:  I have reviewed the lab findings, diagnostic data, medications, and the plan for sedation

## 2023-09-19 NOTE — TELEPHONE ENCOUNTER
Message was sent to patient for follow-up     Michael MCQUEEN RN  [facility]

## 2023-09-19 NOTE — TELEPHONE ENCOUNTER
3rd follow up. Contour next not covered. Preferred is One Touch. Would you like to change to preferred or PA? Order released to pharmacy. Closing encounter

## 2023-09-29 ENCOUNTER — LAB (OUTPATIENT)
Dept: LAB | Facility: CLINIC | Age: 38
End: 2023-09-29
Payer: COMMERCIAL

## 2023-09-29 DIAGNOSIS — F25.0 SCHIZOAFFECTIVE DISORDER, BIPOLAR TYPE (H): ICD-10-CM

## 2023-09-29 DIAGNOSIS — G43.009 MIGRAINE WITHOUT AURA AND WITHOUT STATUS MIGRAINOSUS, NOT INTRACTABLE: ICD-10-CM

## 2023-09-29 DIAGNOSIS — G43.719 INTRACTABLE CHRONIC MIGRAINE WITHOUT AURA AND WITHOUT STATUS MIGRAINOSUS: ICD-10-CM

## 2023-09-29 DIAGNOSIS — K21.9 GASTROESOPHAGEAL REFLUX DISEASE WITHOUT ESOPHAGITIS: ICD-10-CM

## 2023-09-29 LAB
BASOPHILS # BLD AUTO: 0.1 10E3/UL (ref 0–0.2)
BASOPHILS NFR BLD AUTO: 1 %
EOSINOPHIL # BLD AUTO: 0.3 10E3/UL (ref 0–0.7)
EOSINOPHIL NFR BLD AUTO: 2 %
ERYTHROCYTE [DISTWIDTH] IN BLOOD BY AUTOMATED COUNT: 14.3 % (ref 10–15)
HCT VFR BLD AUTO: 35.4 % (ref 35–47)
HGB BLD-MCNC: 10.8 G/DL (ref 11.7–15.7)
IMM GRANULOCYTES # BLD: 0.2 10E3/UL
IMM GRANULOCYTES NFR BLD: 1 %
LYMPHOCYTES # BLD AUTO: 4.1 10E3/UL (ref 0.8–5.3)
LYMPHOCYTES NFR BLD AUTO: 22 %
MCH RBC QN AUTO: 29.1 PG (ref 26.5–33)
MCHC RBC AUTO-ENTMCNC: 30.5 G/DL (ref 31.5–36.5)
MCV RBC AUTO: 95 FL (ref 78–100)
MONOCYTES # BLD AUTO: 0.8 10E3/UL (ref 0–1.3)
MONOCYTES NFR BLD AUTO: 4 %
NEUTROPHILS # BLD AUTO: 13 10E3/UL (ref 1.6–8.3)
NEUTROPHILS NFR BLD AUTO: 71 %
PLATELET # BLD AUTO: 379 10E3/UL (ref 150–450)
RBC # BLD AUTO: 3.71 10E6/UL (ref 3.8–5.2)
WBC # BLD AUTO: 18.5 10E3/UL (ref 4–11)

## 2023-09-29 PROCEDURE — 85025 COMPLETE CBC W/AUTO DIFF WBC: CPT

## 2023-09-29 PROCEDURE — 36415 COLL VENOUS BLD VENIPUNCTURE: CPT

## 2023-09-29 NOTE — TELEPHONE ENCOUNTER
Pending Prescriptions:                       Disp   Refills    topiramate (TOPAMAX) 50 MG tablet [Pharmac*60 tab*3        Sig: Take 1 tablet (50 mg) by mouth 2 times daily  Refused Prescriptions:                       Disp   Refills    omeprazole (PRILOSEC) 20 MG DR capsule [Ph*28 cap*2        Sig: TAKE 1 CAPSULE BY MOUTH EVERY DAY  Refused By: RODOLFO NORTON  Reason for Refusal: Should already have refills on file    Routing refill request to provider for review/approval because:    Anti-Seizure Meds Protocol  [id number]/2023 08:04 AM   Protocol Details   Review Authorizing provider's last note.    Normal CBC on file in past 26 months    Recent (12 mo) or future (30 days) visit within the authorizing provider's specialty    Normal ALT or AST on file in past 26 months    Normal platelet count on file in past 26 months    Medication is active on med list    No active pregnancy on record    No positive pregnancy test in last 12 months        Rodolfo Norton RN  [facility]

## 2023-10-02 ENCOUNTER — PATIENT OUTREACH (OUTPATIENT)
Dept: ONCOLOGY | Facility: CLINIC | Age: 38
End: 2023-10-02

## 2023-10-02 ENCOUNTER — OFFICE VISIT (OUTPATIENT)
Dept: NEPHROLOGY | Facility: CLINIC | Age: 38
End: 2023-10-02
Payer: COMMERCIAL

## 2023-10-02 VITALS
HEART RATE: 76 BPM | OXYGEN SATURATION: 97 % | SYSTOLIC BLOOD PRESSURE: 143 MMHG | BODY MASS INDEX: 38.96 KG/M2 | WEIGHT: 227 LBS | DIASTOLIC BLOOD PRESSURE: 69 MMHG

## 2023-10-02 DIAGNOSIS — Z79.4 TYPE 2 DIABETES MELLITUS WITH STAGE 3A CHRONIC KIDNEY DISEASE, WITH LONG-TERM CURRENT USE OF INSULIN (H): ICD-10-CM

## 2023-10-02 DIAGNOSIS — N18.31 STAGE 3A CHRONIC KIDNEY DISEASE (H): ICD-10-CM

## 2023-10-02 DIAGNOSIS — N18.31 TYPE 2 DIABETES MELLITUS WITH STAGE 3A CHRONIC KIDNEY DISEASE, WITH LONG-TERM CURRENT USE OF INSULIN (H): ICD-10-CM

## 2023-10-02 DIAGNOSIS — R42 LIGHTHEADEDNESS: ICD-10-CM

## 2023-10-02 DIAGNOSIS — I10 ESSENTIAL HYPERTENSION: ICD-10-CM

## 2023-10-02 DIAGNOSIS — R60.0 LOWER EXTREMITY EDEMA: ICD-10-CM

## 2023-10-02 DIAGNOSIS — D72.829 LEUKOCYTOSIS, UNSPECIFIED TYPE: Primary | ICD-10-CM

## 2023-10-02 DIAGNOSIS — E11.22 TYPE 2 DIABETES MELLITUS WITH STAGE 3A CHRONIC KIDNEY DISEASE, WITH LONG-TERM CURRENT USE OF INSULIN (H): ICD-10-CM

## 2023-10-02 DIAGNOSIS — N25.81 SECONDARY RENAL HYPERPARATHYROIDISM (H): ICD-10-CM

## 2023-10-02 DIAGNOSIS — D64.9 ANEMIA, UNSPECIFIED TYPE: ICD-10-CM

## 2023-10-02 PROCEDURE — 99214 OFFICE O/P EST MOD 30 MIN: CPT | Performed by: INTERNAL MEDICINE

## 2023-10-02 RX ORDER — BUMETANIDE 0.5 MG/1
0.5 TABLET ORAL DAILY
Qty: 90 TABLET | Refills: 0 | Status: SHIPPED | OUTPATIENT
Start: 2023-10-02 | End: 2023-12-01

## 2023-10-02 RX ORDER — TOPIRAMATE 50 MG/1
50 TABLET, FILM COATED ORAL 2 TIMES DAILY
Qty: 60 TABLET | Refills: 3 | Status: SHIPPED | OUTPATIENT
Start: 2023-10-02 | End: 2024-01-26

## 2023-10-02 NOTE — PATIENT INSTRUCTIONS
Please see if we can move Divina's echo date up sooner than currently scheduled in November.   [facility] pharmacy referral - [place]  Hematology referral - [place]  Labs October 10th when at [place] for your appointment.   Please see if we can move up the cardiology appointment as well (currently scheduled in Dec).

## 2023-10-02 NOTE — NURSING NOTE
Divina Delgadillo's goals for this visit include:   Chief Complaint   Patient presents with    RECHECK     Follow up CKD        She requests these members of her care team be copied on today's visit information: yes     PCP: Jaleesa Velasquez    Referring Provider:  Referred Self, MD  No address on file    BP (!) 143/69 (BP Location: Left arm, Patient Position: Chair, Cuff Size: Adult Large)   Pulse 76   Wt 103 kg (227 lb)   SpO2 97%   BMI 38.96 kg/m      Do you need any medication refills at today's visit? No       CATINA Kay   Neph/Pulm [facility]

## 2023-10-02 NOTE — PROGRESS NOTES
Hematology referral reviewed for [facility], see below.    Referral reason: leukocytosis with absolute neutrophilia    Current abnormal labs: Available in Chart Review    Outreach: Staceyhart sent to patient    Plan: Triage instructions updated and sent to NPS for completion.

## 2023-10-04 ENCOUNTER — TELEPHONE (OUTPATIENT)
Dept: ONCOLOGY | Facility: HOSPITAL | Age: 38
End: 2023-10-04
Payer: COMMERCIAL

## 2023-10-04 NOTE — TELEPHONE ENCOUNTER
Called and spoke with pt regarding apt tomorrow 10/05/23 with Dr. Mckeon needing to be virtual, as provider is out of the country for a family emergency. Pt stated she would like to reschedule with a different provider to be seen in clinic. Pt was currently driving and requested someone from [facility] scheduling to call her back to reschedule, as pt did not have good cell service. I informed pt I would cancel the apt for tomorrow and someone will be calling her to reschedule. Pt verbalized understanding.

## 2023-10-09 ENCOUNTER — MYC MEDICAL ADVICE (OUTPATIENT)
Dept: FAMILY MEDICINE | Facility: CLINIC | Age: 38
End: 2023-10-09
Payer: COMMERCIAL

## 2023-10-09 DIAGNOSIS — K21.9 GASTROESOPHAGEAL REFLUX DISEASE WITHOUT ESOPHAGITIS: ICD-10-CM

## 2023-10-09 NOTE — TELEPHONE ENCOUNTER
"Routing refill request to provider for review/approval because:  Provider to determine length of treatment       Requested Prescriptions   Pending Prescriptions Disp Refills    omeprazole (PRILOSEC) 20 MG DR capsule [Pharmacy Med Name: omeprazole 20 mg capsule,delayed release] 28 capsule 2     Sig: TAKE 1 CAPSULE BY MOUTH EVERY DAY       PPI Protocol Passed - 10/9/2023  1:57 PM        Passed - Not on Clopidogrel (unless Pantoprazole ordered)        Passed - No diagnosis of osteoporosis on record        Passed - Recent (12 mo) or future (30 days) visit within the authorizing provider's specialty     Patient has had an office visit with the authorizing provider or a provider within the authorizing providers department within the previous 12 mos or has a future within next 30 days. See \"Patient Info\" tab in inbasket, or \"Choose Columns\" in Meds & Orders section of the refill encounter.              Passed - Medication is active on med list        Passed - Patient is age 18 or older        Passed - No active pregnacy on record        Passed - No positive pregnancy test in past 12 months                 Arnaldo March RN 10/09/23 2:00 PM   "

## 2023-10-10 ENCOUNTER — ALLIED HEALTH/NURSE VISIT (OUTPATIENT)
Dept: EDUCATION SERVICES | Facility: CLINIC | Age: 38
End: 2023-10-10
Payer: COMMERCIAL

## 2023-10-10 DIAGNOSIS — E11.65 UNCONTROLLED TYPE 2 DIABETES MELLITUS WITH HYPERGLYCEMIA (H): ICD-10-CM

## 2023-10-10 PROCEDURE — G0108 DIAB MANAGE TRN  PER INDIV: HCPCS | Performed by: DIETITIAN, REGISTERED

## 2023-10-10 NOTE — Clinical Note
10/10/2023         RE: Divina Delgadillo  [address] [address] [facility] [address]        Dear Colleague,    Thank you for referring your patient, Divina Delgadillo, to the [facility] [place]. Please see a copy of my visit note below.    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  Reviewed history and medication management in detail.  Reviewed food, intake and insulin use & action in detail.  [employer] insurance is set up that Medicare is primary, therefore has to meet Medicare part B guidelines for a pump with a qualifying C-peptide which is allowed to go slightly higher given lower GFR and CKD.  If not able to get a qualifying c-peptide will attempt an appeal.      Spent time with talking through insulin goals, meal size and insulin action.     Reviewed Dexcom Clarity in detail and her [employer] worker will try to help her set it up on the new laptop.     Patient's most recent   Lab Results   Component Value Date    A1C 8.3 09/13/2023    A1C 7.4 07/08/2021     is not meeting goal of <7.0    Diabetes knowledge and skills assessment:   Patient is knowledgeable in diabetes management concepts related to: Healthy Eating, Being Active, Monitoring, Taking Medication, Problem Solving, Reducing Risks, and Healthy Coping    Continue education with the following diabetes management concepts: Healthy Eating, Being Active, Monitoring, Taking Medication, Problem Solving, Reducing Risks, and Healthy Coping    Based on learning assessment above, most appropriate setting for further diabetes education would be: Individual setting.      PLAN  Continue with positive diet & lifestyle changes   New C-peptide / appeal for  "pump   See Care Plan for co-developed, patient-state behavior change goals.  AVS provided for patient today.      SUBJECTIVE/OBJECTIVE:  Presents for: Follow-up  Accompanied by: Self  Diabetes education in the past 24mo: Yes  Focus of Visit: Monitoring, Reducing Risks, Taking Medication  Disease course: Improving  How confident are you filling out medical forms by yourself:: Quite a bit  Other concerns:: None  Cultural Influences/Ethnic Background:  Not  or     Diabetes Symptoms & Complications:  Fatigue: Yes  Neuropathy: Yes  Polydipsia: Yes  Polyphagia: Yes  Polyuria: Yes  Visual change: No  Slow healing wounds: No  Autonomic neuropathy: Yes  CVA: No  Heart disease: No  Nephropathy: Yes  Peripheral neuropathy: Yes  Peripheral Vascular Disease: No  Retinopathy: No  Sexual dysfunction: No    Patient Problem List and Family Medical History reviewed for relevant medical history, current medical status, and diabetes risk factors.    Vitals:  There were no vitals taken for this visit.  Estimated body mass index is 39.2 kg/m  as calculated from the following:    Height as of 8/31/23: 1.626 m (5' 4\").    Weight as of 10/19/23: 103.6 kg (228 lb 6.4 oz).   Last 3 BP:   BP Readings from Last 3 Encounters:   10/19/23 138/70   10/02/23 (!) 143/69   09/18/23 131/62       History   Smoking Status     Former     Types: Vaping Device, Cigarettes     Quit date: 2/12/2023   Smokeless Tobacco     Never       Labs:  Lab Results   Component Value Date    A1C 8.3 09/13/2023    A1C 7.4 07/08/2021     Lab Results   Component Value Date    GLC  10/19/2023      Comment:      Disregard results. Test cancel by Dr. Lazcano  This is a corrected result. Previous result was 149 mg/dL on 10/19/2023 at 10:32 AM CDT     10/19/2023     09/18/2023     12/19/2022     07/08/2021     Lab Results   Component Value Date    LDL 81 12/21/2022    LDL 72 03/26/2021     HDL Cholesterol   Date Value Ref Range Status " "  03/26/2021 57 >49 mg/dL Final     Direct Measure HDL   Date Value Ref Range Status   12/21/2022 72 >=50 mg/dL Final   ]  GFR Estimate   Date Value Ref Range Status   10/19/2023 51 (L) >60 mL/min/1.73m2 Final   07/08/2021 52 (L) >60 mL/min/[1.73_m2] Final     Comment:     Non  GFR Calc  Starting 12/18/2018, serum creatinine based estimated GFR (eGFR) will be   calculated using the Chronic Kidney Disease Epidemiology Collaboration   (CKD-EPI) equation.       GFR Estimate If Black   Date Value Ref Range Status   07/08/2021 60 (L) >60 mL/min/[1.73_m2] Final     Comment:      GFR Calc  Starting 12/18/2018, serum creatinine based estimated GFR (eGFR) will be   calculated using the Chronic Kidney Disease Epidemiology Collaboration   (CKD-EPI) equation.       Lab Results   Component Value Date    CR 1.37 10/19/2023    CR 1.32 07/08/2021     No results found for: \"MICROALBUMIN\"    Healthy Eating:  Healthy Eating Assessed Today: Yes  Cultural/Anglican diet restrictions?: No  Meal planning/habits: None  How many times a week on average do you eat food made away from home (restaurant/take-out)?: 3    Being Active:  Being Active Assessed Today: Yes  Exercise:: Yes  Barrier to exercise: None    Monitoring:  Monitoring Assessed Today: Yes  Blood Glucose Meter: CGM  Times checking blood sugar at home (number): 5+  Times checking blood sugar at home (per): Day  Blood glucose trend: Other    Taking Medications:  Diabetes Medication(s)       Diabetic Other       glucose (BD GLUCOSE) 4 g chewable tablet    Take 1 tablet by mouth every hour as needed for low blood sugar      Insulin       Insulin Aspart FlexPen 100 UNIT/ML SOPN    Inject 1-14 Units Subcutaneous 3 times daily (with meals) 3 times daily (with meals) (Use Novlog on meal size small meal 15-30gm carb: 3 units, average meal 45-60gm carb: 5 units, large meal 60gm+ carb: 7 units + pre meal correction 1:40>150. Max dose per 24 hours is 50 " units)     insulin glargine (LANTUS SOLOSTAR) 100 UNIT/ML pen    Inject 32 Units Subcutaneous every morning            Current Treatments: Insulin Injections  Dose schedule: Pre-breakfast, Pre-lunch, Pre-dinner  Given by: Patient    Problem Solving:   Hypoglycemia prevention  & treatment     Reducing Risks:  Reducing Risks Assessed Today: Yes  CAD Risks: Diabetes Mellitus, Family history, Hypertension, Stress  Has dilated eye exam at least once a year?: No  Sees dentist every 6 months?: No  Feet checked by healthcare provider in the last year?: No    Healthy Coping:  Healthy Coping Assessed Today: Yes  Emotional response to diabetes: Ready to learn  Informal Support system:: Family, Other, Brenda based  Stage of change: ACTION (Actively working towards change)  Patient Activation Measure Survey Score:      5/10/2018     1:00 PM   EMY Score (Last Two)   EMY Raw Score 35   Activation Score 72.1   EMY Level 3         Care Plan and Education Provided:  There are no care plans that you recently modified to display for this patient.      Dolly Riley RD, DANIEL, [facility]  Diabetes Education      Time Spent: 60 minutes  Encounter Type: Individual

## 2023-10-10 NOTE — PROGRESS NOTES
Diabetes Self-Management Education & Support    Presents for: Follow-up    Type of Service: In Person Visit    ASSESSMENT:  Divina comes in her her new [employer] worker who is with her 2 days a week for 5 hours each time.  Reviewed history and medication management in detail.  Reviewed food, intake and insulin use & action in detail.  [employer] insurance is set up that Medicare is primary, therefore has to meet Medicare part B guidelines for a pump with a qualifying C-peptide which is allowed to go slightly higher given lower GFR and CKD.  If not able to get a qualifying c-peptide will attempt an appeal.      Spent time with talking through insulin goals, meal size and insulin action.     Reviewed Dexcom Clarity in detail and her [employer] worker will try to help her set it up on the new laptop.     Patient's most recent   Lab Results   Component Value Date    A1C 8.3 09/13/2023    A1C 7.4 07/08/2021     is not meeting goal of <7.0    Diabetes knowledge and skills assessment:   Patient is knowledgeable in diabetes management concepts related to: Healthy Eating, Being Active, Monitoring, Taking Medication, Problem Solving, Reducing Risks, and Healthy Coping    Continue education with the following diabetes management concepts: Healthy Eating, Being Active, Monitoring, Taking Medication, Problem Solving, Reducing Risks, and Healthy Coping    Based on learning assessment above, most appropriate setting for further diabetes education would be: Individual setting.      PLAN  Continue with positive diet & lifestyle changes   New C-peptide / appeal for pump   See Care Plan for co-developed, patient-state behavior change goals.  AVS provided for patient today.      SUBJECTIVE/OBJECTIVE:  Presents for: Follow-up  Accompanied by: Self  Diabetes education in the past 24mo: Yes  Focus of Visit: Monitoring, Reducing Risks, Taking Medication  Disease course: Improving  How confident are you filling out medical forms by yourself:: Quite a  "bit  Other concerns:: None  Cultural Influences/Ethnic Background:  Not  or     Diabetes Symptoms & Complications:  Fatigue: Yes  Neuropathy: Yes  Polydipsia: Yes  Polyphagia: Yes  Polyuria: Yes  Visual change: No  Slow healing wounds: No  Autonomic neuropathy: Yes  CVA: No  Heart disease: No  Nephropathy: Yes  Peripheral neuropathy: Yes  Peripheral Vascular Disease: No  Retinopathy: No  Sexual dysfunction: No    Patient Problem List and Family Medical History reviewed for relevant medical history, current medical status, and diabetes risk factors.    Vitals:  There were no vitals taken for this visit.  Estimated body mass index is 39.2 kg/m  as calculated from the following:    Height as of 8/31/23: 1.626 m (5' 4\").    Weight as of 10/19/23: 103.6 kg (228 lb 6.4 oz).   Last 3 BP:   BP Readings from Last 3 Encounters:   10/19/23 138/70   10/02/23 (!) 143/69   09/18/23 131/62       History   Smoking Status    Former    Types: Vaping Device, Cigarettes    Quit date: 2/12/2023   Smokeless Tobacco    Never       Labs:  Lab Results   Component Value Date    A1C 8.3 09/13/2023    A1C 7.4 07/08/2021     Lab Results   Component Value Date    GLC  10/19/2023      Comment:      Disregard results. Test cancel by Dr. Lazcano  This is a corrected result. Previous result was 149 mg/dL on 10/19/2023 at 10:32 AM CDT     10/19/2023     09/18/2023     12/19/2022     07/08/2021     Lab Results   Component Value Date    LDL 81 12/21/2022    LDL 72 03/26/2021     HDL Cholesterol   Date Value Ref Range Status   03/26/2021 57 >49 mg/dL Final     Direct Measure HDL   Date Value Ref Range Status   12/21/2022 72 >=50 mg/dL Final   ]  GFR Estimate   Date Value Ref Range Status   10/19/2023 51 (L) >60 mL/min/1.73m2 Final   07/08/2021 52 (L) >60 mL/min/[1.73_m2] Final     Comment:     Non  GFR Calc  Starting 12/18/2018, serum creatinine based estimated GFR (eGFR) will be   calculated using " "the Chronic Kidney Disease Epidemiology Collaboration   (CKD-EPI) equation.       GFR Estimate If Black   Date Value Ref Range Status   07/08/2021 60 (L) >60 mL/min/[1.73_m2] Final     Comment:      GFR Calc  Starting 12/18/2018, serum creatinine based estimated GFR (eGFR) will be   calculated using the Chronic Kidney Disease Epidemiology Collaboration   (CKD-EPI) equation.       Lab Results   Component Value Date    CR 1.37 10/19/2023    CR 1.32 07/08/2021     No results found for: \"MICROALBUMIN\"    Healthy Eating:  Healthy Eating Assessed Today: Yes  Cultural/Restorationist diet restrictions?: No  Meal planning/habits: None  How many times a week on average do you eat food made away from home (restaurant/take-out)?: 3    Being Active:  Being Active Assessed Today: Yes  Exercise:: Yes  Barrier to exercise: None    Monitoring:  Monitoring Assessed Today: Yes  Blood Glucose Meter: CGM  Times checking blood sugar at home (number): 5+  Times checking blood sugar at home (per): Day  Blood glucose trend: Other    Taking Medications:  Diabetes Medication(s)       Diabetic Other       glucose (BD GLUCOSE) 4 g chewable tablet    Take 1 tablet by mouth every hour as needed for low blood sugar      Insulin       Insulin Aspart FlexPen 100 UNIT/ML SOPN    Inject 1-14 Units Subcutaneous 3 times daily (with meals) 3 times daily (with meals) (Use Novlog on meal size small meal 15-30gm carb: 3 units, average meal 45-60gm carb: 5 units, large meal 60gm+ carb: 7 units + pre meal correction 1:40>150. Max dose per 24 hours is 50 units)     insulin glargine (LANTUS SOLOSTAR) 100 UNIT/ML pen    Inject 32 Units Subcutaneous every morning            Current Treatments: Insulin Injections  Dose schedule: Pre-breakfast, Pre-lunch, Pre-dinner  Given by: Patient    Problem Solving:   Hypoglycemia prevention  & treatment     Reducing Risks:  Reducing Risks Assessed Today: Yes  CAD Risks: Diabetes Mellitus, Family history, " Hypertension, Stress  Has dilated eye exam at least once a year?: No  Sees dentist every 6 months?: No  Feet checked by healthcare provider in the last year?: No    Healthy Coping:  Healthy Coping Assessed Today: Yes  Emotional response to diabetes: Ready to learn  Informal Support system:: Family, Other, Brenda based  Stage of change: ACTION (Actively working towards change)  Patient Activation Measure Survey Score:      5/10/2018     1:00 PM   EMY Score (Last Two)   EMY Raw Score 35   Activation Score 72.1   EMY Level 3         Care Plan and Education Provided:  There are no care plans that you recently modified to display for this patient.      Dolly Riley RD, LD, [facility]  Diabetes Education      Time Spent: 60 minutes  Encounter Type: Individual

## 2023-10-10 NOTE — PROGRESS NOTES
10/2/23    CC: CKD Stage 3, HTN    HPI: Divina Delgadillo is a 38 year old female who presents for follow-up of CKD. To review, her hx is significant for diabetes (~ 20 years), bipolar disease - was on lithium therapy for around 6 years but since our initial visit, she has since been taken off of it. Her diabetes was previously very poorly controlled  9% in Nov 2020, now 7.8% in October 2021. Creatinine has been 1.20-1.35 in the past year; stable at 1.35  today. Her last UPCR was 0.14 g/g in December of 2020 - she is taking 5 mg of lisinopril currently.     2019 visit: Divina presents for routine follow-up today.  Her creatinine has been 1.23-3.47 in the past year.  Her baseline seems to be someplace between 1.2 and 1.6 and she is stable at 1.41 at this time.  At her last visit she was very motivated to exercise and have been attending the gym regularly.  Unfortunately she has gotten away from the exercise to some degree but is still motivated to continue to make healthy lifestyle changes.  Her blood pressure is well controlled and she has no swelling concerns.  She is not using NSAIDs.    12/15/20: video visit. Creatinine is stable at this time. 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 Some back pain in the AM -she attributes this to her bed. BP has been good at recent visit. No lightheadedness unless blood sugar is low. No NSAIDs. Eating and drinking well. She had her teeth pulled at the end of May. She is getting new teeth in January so she is excited about that.     12/20/21: Divina is presenting for routine follow-up today. Her creatinine has been 1.20-1.35 over the last year. Her baseline seems to be 1.20-1.40, stable at 1.35 today. At her last visit (virtual) she was doing well with her diabetes (diet and exercise). Still enjoys working at the [employer]. No new concerns today, overall doing well. Was able to quit smoking for two months, but did start again. Plans to  stop in the future, encouraged her to continue with the cessation.    12/19/22: in person visit. Creatinine has been 1.06-1.37; stable at 1.24 at this time. No proteinuria on her check from Dec 2021. She quit smoking this past July - congratulated her on this success. BP high today but otherwise has been 120-69. She denies difficulty with UTIs. On ROS she reports headache episodes that last for a few hours - this has been going on for a couple of months - no vision changes but she notices being in the dark helps them. She has follow-up with primary coming up to discuss this further. No difficulty with yeast infections. BP high today but was 120-69 on Dec 5th visit.     08/22/23: video visit. She had a CT scan of the brain and found a malformation. Causing migraine headaches and dizzy spells. She had a fall in the kidney in July. She is eating/drinking ok. Some diarrhea - she has anywhere from 0-multiple. With the loose stools, could be dehydrated. She got a new job not too long ago - she was drinking a lot of water with work. She is not working now. She is still drinking a lot of water. BP recently in clinic was 110/64. She reports some lightheadedness at times now. She was going to get anesthesia for the MRI but it was canceled because of kidney function - this was at [place]. It is rescheduled for Sept 18th. No NSAIDs. Topamax was added for migraine prevention. Nothing OTC. No nasal treatment.     10/2/23: in person visit. GFR is improved to 49 now from 37 in August. On ROS she reports dizziness. No home BP readings. She has swelling that is worse through the day. BP is high today but 131 recently.     ACCU-CHEK GUIDE test strip, Use to test blood sugar 3 times daily or as directed.  albuterol (VENTOLIN HFA) 108 (90 Base) MCG/ACT inhaler, INHALE 2 PUFFS INTO THE LUNGS EVERY SIX  HOURS AS NEEDED FOR SHORTNESS OF BREATH  ARIPiprazole (ABILIFY) 30 MG tablet, Take 30 mg by mouth daily  atorvastatin (LIPITOR) 10 MG  tablet, Take 1 tablet (10 mg) by mouth daily  Biotin 5 MG TABS, Take 1 tablet by mouth daily  blood glucose (NO BRAND SPECIFIED) test strip, Use to test blood sugar 4 times daily or as directed.  blood glucose (ONETOUCH VERIO IQ) test strip, Use to test blood sugar up to 3 times daily  Blood Glucose Monitoring Suppl (ONETOUCH VERIO FLEX SYSTEM) w/Device KIT, 1 each daily  carvedilol (COREG) 12.5 MG tablet, Take 1 tablet (12.5 mg) by mouth 2 times daily  cloZAPine (CLOZARIL) 100 MG tablet, Take 50 mg by mouth 2 times daily  Continuous Blood Gluc Sensor (DEXCOM G6 SENSOR) MISC, 1 each every 10 days. Change every 10 days. USE TO READ BLOOD SUGARS PER  INSTRUCTIONS  Continuous Blood Gluc Sensor (GUARDIAN 4 GLUCOSE SENSOR) MISC, 1 each every 7 days Change sensor every 7 days  Continuous Blood Gluc Transmit (DEXCOM G6 TRANSMITTER) MISC, 1 each every 3 months Change every 3 months. USE TO READ BLOOD SUGARS PER  INSTRUCTIONS  FLUoxetine (PROZAC) 40 MG capsule, Take 40 mg by mouth daily  hydrOXYzine (ATARAX) 25 MG tablet, Take 1 Tablet (25 mg) by mouth 3 times daily if needed for Anxiety.  insulin aspart (NOVOLOG VIAL) 100 UNITS/ML vial, For use with continuous insulin pump.  Max TDD 60.  Insulin Aspart FlexPen 100 UNIT/ML SOPN, Inject 1-14 Units Subcutaneous 3 times daily (with meals) 3 times daily (with meals) (Use Novlog on meal size small meal 15-30gm carb: 3 units, average meal 45-60gm carb: 5 units, large meal 60gm+ carb: 7 units + pre meal correction 1:40>150. Max dose per 24 hours is 50 units)  insulin glargine (LANTUS SOLOSTAR) 100 UNIT/ML pen, Inject 32 Units Subcutaneous every morning  insulin pen needle (32G X 6 MM) 32G X 6 MM miscellaneous, Use 4 pen needles daily.  insulin pen needle (ULTICARE MINI) 31G X 6 MM miscellaneous, USE 1 PEN NEEDLE 4 times daily  lamoTRIgine (LAMICTAL) 100 MG tablet, Take 100 mg by mouth At Bedtime  loperamide (IMODIUM A-D) 2 MG tablet, 2 caplets after loose  "stool then 1 after each loose stool do not exceed 4 in 24hrs  loratadine (CLARITIN) 10 MG tablet, Take 1 tablet (10 mg) by mouth every morning  omeprazole (PRILOSEC) 20 MG DR capsule, TAKE 1 CAPSULE BY MOUTH EVERY DAY  OneTouch Delica Lancets 33G MISC, 1 each 4 times daily  QUEtiapine (SEROQUEL) 25 MG tablet, Take 25 mg by mouth 1 tablet 4 times daily as needed for agitation or hallucinations  topiramate (TOPAMAX) 50 MG tablet, Take 1 tablet (50 mg) by mouth 2 times daily  traZODone (DESYREL) 50 MG tablet, Take 1-2 Tablets ( mg) by mouth at bedtime if needed for Sleep.  acetaminophen (TYLENOL) 325 MG tablet, Take 325-650 mg by mouth 2 tab every 4-6 hours as needed, do not exceed 9 tabs in 24hours (Patient not taking: Reported on 8/9/2023)  Biotin 5000 MCG CAPS, Take 1 tablet by mouth daily at 2 pm (Patient not taking: Reported on 8/22/2023)  COMPOUNDED NON-CONTROLLED SUBSTANCE (CMPD RX) - PHARMACY TO MIX COMPOUNDED MEDICATION, Chloramphenicol 50 mg, sulfamethoxazole 50 mg, amphotericin B 5 mg, hydrocortisone 1 mg. 2-3 puffs to affected ear PRN itching/drainage (Patient not taking: Reported on 7/13/2023)  Continuous Blood Gluc Transmit (GUARDIAN 4 TRANSMITTER) MISC, 1 each every 7 days Change sensor every 7 days (Patient not taking: Reported on 8/9/2023)  fluocinolone acetonide 0.01 % OIL, Place 4 drops in ear(s) 2 times daily as needed (ear itching) (Patient not taking: Reported on 7/13/2023)  glucose (BD GLUCOSE) 4 g chewable tablet, Take 1 tablet by mouth every hour as needed for low blood sugar (Patient not taking: Reported on 8/31/2023)  Insulin Infusion Pump (MINIMED 780G INSULIN PUMP) KIT, 1 each daily SmartGuard Target: 100; Active Insulin Time: 2 hours (recommended); SmartGuardTM Warmup/Manual Mode: Suspend before low (recommended) (Patient not taking: Reported on 8/22/2023)  Insulin Infusion Pump Supplies (EXTENDED INFUSION SET 23\"/6MM) MISC, 1 each every 7 days Max Total Daily Dose 70 units; Change " infusion set & reservoir every 7 days (recommended) (Patient not taking: Reported on 8/22/2023)  Insulin Infusion Pump Supplies (EXTENDED RESERVOIR 3ML) MISC, 1 each every 7 days (Patient not taking: Reported on 8/22/2023)  Pyridoxine HCl (VITAMIN B6) 100 MG TABS, Take 100 mg by mouth daily (Patient not taking: Reported on 8/9/2023)    No current facility-administered medications on file prior to visit.    Exam:  BP (!) 143/69 (BP Location: Left arm, Patient Position: Chair, Cuff Size: Adult Large)   Pulse 76   Wt 103 kg (227 lb)   SpO2 97%   BMI 38.96 kg/m    GENERAL: Healthy, alert and no distress    Results:  No visits with results within 1 Day(s) from this visit.   Latest known visit with results is:   Lab on 09/29/2023   Component Date Value Ref Range Status    WBC Count 09/29/2023 18.5 (H)  4.0 - 11.0 10e3/uL Final    RBC Count 09/29/2023 3.71 (L)  3.80 - 5.20 10e6/uL Final    Hemoglobin 09/29/2023 10.8 (L)  11.7 - 15.7 g/dL Final    Hematocrit 09/29/2023 35.4  35.0 - 47.0 % Final    MCV 09/29/2023 95  78 - 100 fL Final    MCH 09/29/2023 29.1  26.5 - 33.0 pg Final    MCHC 09/29/2023 30.5 (L)  31.5 - 36.5 g/dL Final    RDW 09/29/2023 14.3  10.0 - 15.0 % Final    Platelet Count 09/29/2023 379  150 - 450 10e3/uL Final    % Neutrophils 09/29/2023 71  % Final    % Lymphocytes 09/29/2023 22  % Final    % Monocytes 09/29/2023 4  % Final    % Eosinophils 09/29/2023 2  % Final    % Basophils 09/29/2023 1  % Final    % Immature Granulocytes 09/29/2023 1  % Final    Absolute Neutrophils 09/29/2023 13.0 (H)  1.6 - 8.3 10e3/uL Final    Absolute Lymphocytes 09/29/2023 4.1  0.8 - 5.3 10e3/uL Final    Absolute Monocytes 09/29/2023 0.8  0.0 - 1.3 10e3/uL Final    Absolute Eosinophils 09/29/2023 0.3  0.0 - 0.7 10e3/uL Final    Absolute Basophils 09/29/2023 0.1  0.0 - 0.2 10e3/uL Final    Absolute Immature Granulocytes 09/29/2023 0.2  <=0.4 10e3/uL Final      No visits with results within 1 Day(s) from this visit.   Latest  known visit with results is:   Lab on 09/29/2023   Component Date Value Ref Range Status    WBC Count 09/29/2023 18.5 (H)  4.0 - 11.0 10e3/uL Final    RBC Count 09/29/2023 3.71 (L)  3.80 - 5.20 10e6/uL Final    Hemoglobin 09/29/2023 10.8 (L)  11.7 - 15.7 g/dL Final    Hematocrit 09/29/2023 35.4  35.0 - 47.0 % Final    MCV 09/29/2023 95  78 - 100 fL Final    MCH 09/29/2023 29.1  26.5 - 33.0 pg Final    MCHC 09/29/2023 30.5 (L)  31.5 - 36.5 g/dL Final    RDW 09/29/2023 14.3  10.0 - 15.0 % Final    Platelet Count 09/29/2023 379  150 - 450 10e3/uL Final    % Neutrophils 09/29/2023 71  % Final    % Lymphocytes 09/29/2023 22  % Final    % Monocytes 09/29/2023 4  % Final    % Eosinophils 09/29/2023 2  % Final    % Basophils 09/29/2023 1  % Final    % Immature Granulocytes 09/29/2023 1  % Final    Absolute Neutrophils 09/29/2023 13.0 (H)  1.6 - 8.3 10e3/uL Final    Absolute Lymphocytes 09/29/2023 4.1  0.8 - 5.3 10e3/uL Final    Absolute Monocytes 09/29/2023 0.8  0.0 - 1.3 10e3/uL Final    Absolute Eosinophils 09/29/2023 0.3  0.0 - 0.7 10e3/uL Final    Absolute Basophils 09/29/2023 0.1  0.0 - 0.2 10e3/uL Final    Absolute Immature Granulocytes 09/29/2023 0.2  <=0.4 10e3/uL Final             Assessment/Plan:   1. CKD Stage 3: risk factors for kidney disease include longstanding hypertension, diabetes, as well as longterm previous lithium use. She is now away from lithium which is great to see given she is already at risk for diabetic nephropathy and would like to minimize risk.      2. DM: Her A1c at this time is 8.3%  - stress the importance of good diabetes control.     3. Bipolar: now off of lithium    4. GERD: ideally would avoid PPI therapy given increased risk of incident CKD and AIN noted with PPI therapy. Tolerating once daily dosing - I am not certain we will be able to get away from PPI completely given the severity of her sxs previously.     5. Hypertension/swelling: MTM referral to better understand whether her  swelling could be secondry to medications.     6. Leukocytosis: hematology referral      Patient Instructions   Please see if we can move Divina's echo date up sooner than currently scheduled in November.   [facility] pharmacy referral - [place]  Hematology referral - [place]  Labs October 10th when at [place] for your appointment.   Please see if we can move up the cardiology appointment as well (currently scheduled in Dec).      Sánchez Dias, DO

## 2023-10-11 ENCOUNTER — VIRTUAL VISIT (OUTPATIENT)
Dept: PHARMACY | Facility: CLINIC | Age: 38
End: 2023-10-11
Attending: INTERNAL MEDICINE
Payer: COMMERCIAL

## 2023-10-11 DIAGNOSIS — J30.2 SEASONAL ALLERGIC RHINITIS, UNSPECIFIED TRIGGER: ICD-10-CM

## 2023-10-11 DIAGNOSIS — J45.21 MILD INTERMITTENT ASTHMA WITH ACUTE EXACERBATION: Chronic | ICD-10-CM

## 2023-10-11 DIAGNOSIS — I10 ESSENTIAL HYPERTENSION: Chronic | ICD-10-CM

## 2023-10-11 DIAGNOSIS — G43.719 INTRACTABLE CHRONIC MIGRAINE WITHOUT AURA AND WITHOUT STATUS MIGRAINOSUS: ICD-10-CM

## 2023-10-11 DIAGNOSIS — E78.5 HYPERLIPIDEMIA LDL GOAL <100: Chronic | ICD-10-CM

## 2023-10-11 DIAGNOSIS — F43.10 PTSD (POST-TRAUMATIC STRESS DISORDER): Chronic | ICD-10-CM

## 2023-10-11 DIAGNOSIS — F31.9 BIPOLAR AFFECTIVE DISORDER, REMISSION STATUS UNSPECIFIED (H): Chronic | ICD-10-CM

## 2023-10-11 DIAGNOSIS — E11.42 TYPE 2 DIABETES MELLITUS WITH DIABETIC POLYNEUROPATHY, WITH LONG-TERM CURRENT USE OF INSULIN (H): ICD-10-CM

## 2023-10-11 DIAGNOSIS — Z79.4 TYPE 2 DIABETES MELLITUS WITH DIABETIC POLYNEUROPATHY, WITH LONG-TERM CURRENT USE OF INSULIN (H): ICD-10-CM

## 2023-10-11 DIAGNOSIS — F25.1 SCHIZOAFFECTIVE DISORDER, DEPRESSIVE TYPE (H): Chronic | ICD-10-CM

## 2023-10-11 DIAGNOSIS — F60.3 BORDERLINE PERSONALITY DISORDER (H): Chronic | ICD-10-CM

## 2023-10-11 DIAGNOSIS — F32.A DEPRESSIVE DISORDER: Chronic | ICD-10-CM

## 2023-10-11 DIAGNOSIS — Z78.9 TAKES DIETARY SUPPLEMENTS: ICD-10-CM

## 2023-10-11 DIAGNOSIS — K21.9 GASTROESOPHAGEAL REFLUX DISEASE WITHOUT ESOPHAGITIS: Primary | Chronic | ICD-10-CM

## 2023-10-11 PROCEDURE — 99607 MTMS BY PHARM ADDL 15 MIN: CPT | Mod: VID | Performed by: PHARMACIST

## 2023-10-11 PROCEDURE — 99605 MTMS BY PHARM NP 15 MIN: CPT | Mod: VID | Performed by: PHARMACIST

## 2023-10-11 NOTE — LETTER
October 16, 2023  Divina Delgadillo  [address] [address] [address]    Dear Ms. Delgadillo, [facility]     Thank you for talking with me on Oct 11, 2023 about your health and medications. As a follow-up to our conversation, I have included two documents:      Your Recommended To-Do List has steps you should take to get the best results from your medications.  Your Medication List will help you keep track of your medications and how to take them.    If you want to talk about these documents, please call Leana Hollis RPH at phone: [phone], Monday-Friday 8-4:30pm.    I look forward to working with you and your doctors to make sure your medications work well for you.    Sincerely,  Leana Hollis RPH  [facility] Pharmacist, [facility]

## 2023-10-11 NOTE — LETTER
_  Medication List        Prepared on: 10/16/2023     Bring your Medication List when you go to the doctor, hospital, or   emergency room. And, share it with your family or caregivers.     Note any changes to how you take your medications.  Cross out medications when you no longer use them.    Medication How I take it Why I use it Prescriber   acetaminophen (TYLENOL) 325 MG tablet Take 325-650 mg by mouth 2 tab every 4-6 hours as needed, do not exceed 9 tabs in 24hours Pain Patient Reported   albuterol (VENTOLIN HFA) 108 (90 Base) MCG/ACT inhaler INHALE 2 PUFFS INTO THE LUNGS EVERY SIX  HOURS AS NEEDED FOR SHORTNESS OF BREATH Mild intermittent asthma with acute exacerbation VERONIQUE Bledsoe CNP   ARIPiprazole (ABILIFY) 30 MG tablet Take 30 mg by mouth daily Depression Onur Ross MD   atorvastatin (LIPITOR) 10 MG tablet Take 1 tablet (10 mg) by mouth daily Type 2 diabetes mellitus with diabetic polyneuropathy (H); Hyperlipidemia LDL Goal <100 VERONIQUE Bledsoe CNP   Biotin 5 MG TABS Take 1 tablet by mouth daily Hair Loss VERONIQUE Bledsoe CNP   bumetanide (BUMEX) 0.5 MG tablet Take 1 tablet (0.5 mg) by mouth daily Lower Extremity Edema Sánchez Dias,    carvedilol (COREG) 12.5 MG tablet Take 1 tablet (12.5 mg) by mouth 2 times daily Essential Hypertension Sánchez Dias DO   cloZAPine (CLOZARIL) 100 MG tablet Take 50 mg by mouth 2 times daily Depression Onur Ross MD   FLUoxetine (PROZAC) 40 MG capsule Take 40 mg by mouth daily Depression Onur Ross MD   glucose (BD GLUCOSE) 4 g chewable tablet Take 1 tablet by mouth every hour as needed for low blood sugar Uncontrolled type 2 diabetes mellitus with diabetic polyneuropathy, with long-term current use of insulin (H) VERONIQUE Bledsoe CNP   hydrOXYzine (ATARAX) 25 MG tablet Take 1 Tablet (25 mg) by mouth 3 times daily if needed for Anxiety. Anxiety Onur Ross MD   Insulin Aspart FlexPen 100 UNIT/ML  SOPN Inject 1-14 Units Subcutaneous 3 times daily (with meals) 3 times daily (with meals) (Use Novlog on meal size small meal 15-30gm carb: 3 units, average meal 45-60gm carb: 5 units, large meal 60gm+ carb: 7 units + pre meal correction 1:40>150. Max dose per 24 hours is 50 units) Type 2 diabetes mellitus with diabetic polyneuropathy, with long-term current use of insulin (H) VERONIQUE Bledsoe CNP   insulin glargine (LANTUS SOLOSTAR) 100 UNIT/ML pen Inject 32 Units Subcutaneous every morning Type 2 diabetes mellitus with diabetic polyneuropathy, with long-term current use of insulin (H) VERONIQUE Bledsoe CNP   lamoTRIgine (LAMICTAL) 100 MG tablet Take 100 mg by mouth At Bedtime Bipolar Onur Ross MD   loratadine (CLARITIN) 10 MG tablet Take 1 tablet (10 mg) by mouth every morning Chronic seasonal allergic rhinitis VERONIQUE Bledsoe CNP   omeprazole (PRILOSEC) 20 MG DR capsule TAKE 1 CAPSULE BY MOUTH EVERY DAY Gastroesophageal Reflux Disease without Esophagitis VERONIQUE Bledsoe CNP   topiramate (TOPAMAX) 50 MG tablet Take 1 tablet (50 mg) by mouth 2 times daily Intractable chronic migraine without aura and without status migrainosus; Migraine without aura and without status migrainosus, not intractable VERONIQUE Bledsoe CNP         Add new medications, over-the-counter drugs, herbals, vitamins, or  minerals in the blank rows below.    Medication How I take it Why I use it Prescriber                                      Allergies:      acetaminophen; latex        Side effects I have had:               Other Information:              My notes and questions:

## 2023-10-11 NOTE — PROGRESS NOTES
Medication Therapy Management (MTM) Encounter    ASSESSMENT:                            Medication Adherence/Access: No issues identified    GERD: stable    Hypertension: carvedilol dose increased around the time patient's dizziness started - will start with considering changing Abilify to the evening to see if additive effect - see Plan.     Hyperlipidemia: stable    Type 2 Diabetes: Needs improvement - patient to continue to follow up with endocrinology and diabetes education.     Bipolar Disorder/Borderline Personality Disorder/PTSD/Depression/Schizoaffective Disorder: on multiple medications that can contribute to dizziness. Only two new medications started/dose increased around the time patient started dizziness symptoms are topiramate (started) and carvedilol (increased dose). May be additive effect - patient to discuss with psychiatry - consider switching Abilify to the evening, if no improvement - could consider switching fluoxetine to the evening as well. Monitor dizziness and sleep.     Supplements: stable    Allergies: stable    Migraines: stable - topiramate has risk for dizziness - see Plan.    Asthma: stable    PLAN:                            1. Patient is on several medications that have risk for dizziness. Only two medications that were started/dose increased close to the time patient started noticing symptoms are topiramate and carvedilol. Dizziness may be an additive effect - will start with having patient discuss with psychiatry about switching Abilify to the evening. If no improvement - could consider switching fluoxetine to the evening as well. Monitor dizziness and sleep.     Follow-up: Wednesday, November 8th at 10:00 AM    SUBJECTIVE/OBJECTIVE:                          Divina Delgadillo is a 38 year old female called for an initial visit. She was referred to me from nephrology.      Reason for visit: per referral: lightheadedness in the setting of polypharmacy - input on meds      Allergies/ADRs: Reviewed in chart  Past Medical History: Reviewed in chart  Tobacco: She reports that she quit smoking about 8 months ago. Her smoking use included vaping device and cigarettes. She has been exposed to tobacco smoke. She has never used smokeless tobacco.  Alcohol: not currently using    Medication Adherence/Access: no issues reported    GERD:  Omeprazole 20 mg daily    History of an ulcer. No reflux symptoms - omeprazole is working well.     Hypertension:   Bumetanide 0.5 mg one daily - morning   Carvedilol 12.5 mg twice daily     Has upcoming cardiology appointment in December. Referred due to swelling of ankles. Echo 11/8/2023. Bumex is new for ankle swelling. Lisinopril on hold - no change in dizziness since stopping. Dizziness comes out of the blue - can be walking and all of a sudden dizzy. Dizziness started 6-7 months ago. Carvedilol dose increased to 12.5 mg twice daily 12/19/2022.    GFR Estimate   Date Value Ref Range Status   09/13/2023 49 (L) >60 mL/min/1.73m2 Final   07/08/2021 52 (L) >60 mL/min/[1.73_m2] Final     Comment:     Non  GFR Calc  Starting 12/18/2018, serum creatinine based estimated GFR (eGFR) will be   calculated using the Chronic Kidney Disease Epidemiology Collaboration   (CKD-EPI) equation.         Creatinine   Date Value Ref Range Status   09/13/2023 1.41 (H) 0.51 - 0.95 mg/dL Final   07/08/2021 1.32 (H) 0.52 - 1.04 mg/dL Final     BP Readings from Last 3 Encounters:   10/02/23 (!) 143/69   09/18/23 131/62   08/31/23 108/78     Pulse Readings from Last 3 Encounters:   10/02/23 76   09/18/23 59   08/31/23 75     Hyperlipidemia:   Atorvastatin 10 mg in the evening.     Patient reports no current medication side effects.    Recent Labs   Lab Test 12/21/22  0909 06/17/22  1435   CHOL 175 147   HDL 72 60   LDL 81 55   TRIG 108 162*     Type 2 Diabetes:    Novolog 1-14 units three times daily with meals  Lantus 32 units once daily    Patient reports no  current medication side effects.    Blood sugar monitoring: Continuous Glucose Monitor   Followed by diabetes education and endocrinology.   Looking into switching to an insulin pump.  Current diabetes symptoms: none  Eye exam: up to date  Foot exam: up to date  Urine Albumin:   Lab Results   Component Value Date    UMALCR 9.82 03/22/2023      Lab Results   Component Value Date    A1C 8.3 (H) 09/13/2023     Bipolar Disorder/Borderline Personality Disorder/PTSD/Depression/Schizoaffective Disorder:  Abilify 30 mg daily - morning   Clozapine 50 mg twice daily  Fluoxetine 40 mg daily - morning  Hydroxyzine 25 mg three times daily as needed  Lamotrigine 100 mg at bedtime    Patient works with psychiatry. Sees every 3-4 months. Patient states she has been on clozapine since she was 17 - risk for dizziness 14-27%. Abilify has risk for dizziness 4-10% - patient has been on since at least 2006, fluoxetine has risk for dizziness of 2-11% - patient has been on since at least 2006. Lamotrigine has risk for dizziness of 7-54% - patient has been on since 2018 (Current dose since 2019).    Supplements:  Biotin 5 mg daily    Allergies:  Loratadine 10 mg daily    Allegies are good.     Migraines:   Topiramate 50 mg twice daily    Started 12/21/2022. Risk for dizziness 4-25%.    Asthma:   Albuterol inhaler as needed    Using rarely.    Patient reports no current medication side effects.      Patient reports the following symptoms: none.      10/19/2022     6:58 AM 1/2/2023     1:00 PM 7/13/2023     1:43 PM   ACT Total Scores   ACT TOTAL SCORE (Goal Greater than or Equal to 20) 25 25 25   In the past 12 months, how many times did you visit the emergency room for your asthma without being admitted to the hospital? 0 0 0   In the past 12 months, how many times were you hospitalized overnight because of your asthma? 0 0 0     ----------------  Post Discharge Medication Reconciliation Status: discharge medications reconciled, continue  medications without change.    I spent 30 minutes with this patient today. All changes were made via collaborative practice agreement with VERONIQUE Spears CNP. A copy of the visit note was provided to the patient's provider(s).    A summary of these recommendations was sent via [employer].    Leana Hollis, PharmD  Medication Therapy Management     Telemedicine Visit Details  Type of service:  Telephone visit  Start Time:  3:00 PM  End Time:  3:30 PM       Medication Therapy Recommendations  Depressive disorder    Current Medication: ARIPiprazole (ABILIFY) 30 MG tablet   Rationale: Undesirable effect - Adverse medication event - Safety   Recommendation: Change Administration Time   Status: Contact Provider - Awaiting Response

## 2023-10-11 NOTE — LETTER
"Recommended To-Do List      Prepared on: 10/16/2023       You can get the best results from your medications by completing the items on this \"To-Do List.\"      Bring your To-Do List when you go to your doctor. And, share it with your family or caregivers.    My To-Do List:  What we talked about: What I should do:   An issue with your medication    Talk with psychiatry about changing when you are taking ARIPiprazole (ABILIFY) to the evening          What we talked about: What I should do:                     "

## 2023-10-16 NOTE — PATIENT INSTRUCTIONS
"Recommendations from today's MTM visit:                                                    MTM (medication therapy management) is a service provided by a clinical pharmacist designed to help you get the most of out of your medicines.   Today we reviewed what your medicines are for, how to know if they are working, that your medicines are safe and how to make your medicine regimen as easy as possible.      Divina,    1. You are on several medications that have risk for dizziness. Only two medications were started/dose increased close to the time you started noticing symptoms - topiramate (started 12/2022) and carvedilol (dose increased 12/2022) Dizziness may be an additive effect - will start with having you discuss with psychiatry about switching Abilify to the evening. If no improvement in dizziness - could consider switching fluoxetine to the evening as well. Monitor dizziness and sleep.     Follow-up: Wednesday, November 8th at 10:00 AM    It was great speaking with you today.  I value your experience and would be very thankful for your time in providing feedback in our clinic survey. In the next few days, you may receive an email or text message from [employer] with a link to a survey related to your  clinical pharmacist.\"     To schedule another MTM appointment, please call the clinic directly or you may call the MTM scheduling line at [phone] or toll-free at [phone].     My Clinical Pharmacist's contact information:                                                      Please feel free to contact me with any questions or concerns you have.      Keep up the good work!!    Leana Hollis, PharmD  [phone] in clinic on Tuesday and Wednesday        "

## 2023-10-19 ENCOUNTER — TELEPHONE (OUTPATIENT)
Dept: EDUCATION SERVICES | Facility: CLINIC | Age: 38
End: 2023-10-19

## 2023-10-19 ENCOUNTER — OFFICE VISIT (OUTPATIENT)
Dept: FAMILY MEDICINE | Facility: CLINIC | Age: 38
End: 2023-10-19
Payer: COMMERCIAL

## 2023-10-19 VITALS
HEART RATE: 82 BPM | TEMPERATURE: 98.7 F | WEIGHT: 228.4 LBS | DIASTOLIC BLOOD PRESSURE: 70 MMHG | BODY MASS INDEX: 39.2 KG/M2 | RESPIRATION RATE: 16 BRPM | OXYGEN SATURATION: 97 % | SYSTOLIC BLOOD PRESSURE: 138 MMHG

## 2023-10-19 DIAGNOSIS — Z79.899 HIGH RISK MEDICATION USE: ICD-10-CM

## 2023-10-19 DIAGNOSIS — D64.9 ANEMIA, UNSPECIFIED TYPE: ICD-10-CM

## 2023-10-19 DIAGNOSIS — Z79.4 TYPE 2 DIABETES MELLITUS WITH STAGE 3A CHRONIC KIDNEY DISEASE, WITH LONG-TERM CURRENT USE OF INSULIN (H): Primary | ICD-10-CM

## 2023-10-19 DIAGNOSIS — F25.0 SCHIZOAFFECTIVE DISORDER, BIPOLAR TYPE (H): ICD-10-CM

## 2023-10-19 DIAGNOSIS — G56.01 CARPAL TUNNEL SYNDROME OF RIGHT WRIST: ICD-10-CM

## 2023-10-19 DIAGNOSIS — Z79.4 TYPE 2 DIABETES MELLITUS WITH STAGE 3A CHRONIC KIDNEY DISEASE, WITH LONG-TERM CURRENT USE OF INSULIN (H): ICD-10-CM

## 2023-10-19 DIAGNOSIS — N18.31 TYPE 2 DIABETES MELLITUS WITH STAGE 3A CHRONIC KIDNEY DISEASE, WITH LONG-TERM CURRENT USE OF INSULIN (H): Primary | ICD-10-CM

## 2023-10-19 DIAGNOSIS — N18.31 TYPE 2 DIABETES MELLITUS WITH STAGE 3A CHRONIC KIDNEY DISEASE, WITH LONG-TERM CURRENT USE OF INSULIN (H): ICD-10-CM

## 2023-10-19 DIAGNOSIS — E11.22 TYPE 2 DIABETES MELLITUS WITH STAGE 3A CHRONIC KIDNEY DISEASE, WITH LONG-TERM CURRENT USE OF INSULIN (H): ICD-10-CM

## 2023-10-19 DIAGNOSIS — Z01.818 PREOP GENERAL PHYSICAL EXAM: Primary | ICD-10-CM

## 2023-10-19 DIAGNOSIS — E11.22 TYPE 2 DIABETES MELLITUS WITH STAGE 3A CHRONIC KIDNEY DISEASE, WITH LONG-TERM CURRENT USE OF INSULIN (H): Primary | ICD-10-CM

## 2023-10-19 LAB
ANION GAP SERPL CALCULATED.3IONS-SCNC: 10 MMOL/L (ref 7–15)
BUN SERPL-MCNC: 18.5 MG/DL (ref 6–20)
CALCIUM SERPL-MCNC: 9.2 MG/DL (ref 8.6–10)
CHLORIDE SERPL-SCNC: 111 MMOL/L (ref 98–107)
CREAT SERPL-MCNC: 1.37 MG/DL (ref 0.51–0.95)
DEPRECATED HCO3 PLAS-SCNC: 21 MMOL/L (ref 22–29)
EGFRCR SERPLBLD CKD-EPI 2021: 51 ML/MIN/1.73M2
ERYTHROCYTE [DISTWIDTH] IN BLOOD BY AUTOMATED COUNT: 14.1 % (ref 10–15)
FASTING STATUS PATIENT QL REPORTED: NORMAL
FERRITIN SERPL-MCNC: 35 NG/ML (ref 6–175)
GLUCOSE SERPL-MCNC: 149 MG/DL (ref 70–99)
GLUCOSE SERPL-MCNC: NORMAL MG/DL
HCT VFR BLD AUTO: 37.3 % (ref 35–47)
HGB BLD-MCNC: 11.2 G/DL (ref 11.7–15.7)
IRON BINDING CAPACITY (ROCHE): 303 UG/DL (ref 240–430)
IRON SATN MFR SERPL: 28 % (ref 15–46)
IRON SERPL-MCNC: 86 UG/DL (ref 37–145)
MCH RBC QN AUTO: 29.1 PG (ref 26.5–33)
MCHC RBC AUTO-ENTMCNC: 30 G/DL (ref 31.5–36.5)
MCV RBC AUTO: 97 FL (ref 78–100)
PLATELET # BLD AUTO: 424 10E3/UL (ref 150–450)
POTASSIUM SERPL-SCNC: 4.6 MMOL/L (ref 3.4–5.3)
RBC # BLD AUTO: 3.85 10E6/UL (ref 3.8–5.2)
SODIUM SERPL-SCNC: 142 MMOL/L (ref 135–145)
WBC # BLD AUTO: 16.5 10E3/UL (ref 4–11)

## 2023-10-19 PROCEDURE — 83540 ASSAY OF IRON: CPT | Performed by: NURSE PRACTITIONER

## 2023-10-19 PROCEDURE — 85025 COMPLETE CBC W/AUTO DIFF WBC: CPT | Performed by: NURSE PRACTITIONER

## 2023-10-19 PROCEDURE — 91320 SARSCV2 VAC 30MCG TRS-SUC IM: CPT | Performed by: NURSE PRACTITIONER

## 2023-10-19 PROCEDURE — G0008 ADMIN INFLUENZA VIRUS VAC: HCPCS | Performed by: NURSE PRACTITIONER

## 2023-10-19 PROCEDURE — 90480 ADMN SARSCOV2 VAC 1/ONLY CMP: CPT | Performed by: NURSE PRACTITIONER

## 2023-10-19 PROCEDURE — 85027 COMPLETE CBC AUTOMATED: CPT | Performed by: NURSE PRACTITIONER

## 2023-10-19 PROCEDURE — 99214 OFFICE O/P EST MOD 30 MIN: CPT | Mod: 25 | Performed by: NURSE PRACTITIONER

## 2023-10-19 PROCEDURE — 82728 ASSAY OF FERRITIN: CPT | Performed by: NURSE PRACTITIONER

## 2023-10-19 PROCEDURE — 90686 IIV4 VACC NO PRSV 0.5 ML IM: CPT | Performed by: NURSE PRACTITIONER

## 2023-10-19 PROCEDURE — 80048 BASIC METABOLIC PNL TOTAL CA: CPT | Performed by: NURSE PRACTITIONER

## 2023-10-19 PROCEDURE — 83550 IRON BINDING TEST: CPT | Performed by: NURSE PRACTITIONER

## 2023-10-19 PROCEDURE — 36415 COLL VENOUS BLD VENIPUNCTURE: CPT | Performed by: NURSE PRACTITIONER

## 2023-10-19 ASSESSMENT — PAIN SCALES - GENERAL: PAINLEVEL: NO PAIN (0)

## 2023-10-19 NOTE — COMMUNITY RESOURCES LIST (ENGLISH)
10/19/2023   [facility] vSocial  N/A  For questions about this resource list or additional care needs, please contact your primary care clinic or care manager.  Phone: [phone]   Email: N/A   Address: [address] [place] [address]   Hours: N/A        Financial Stability       Utility payment assistance  1  [employer] Distance: 9.04 miles      In-Person, Phone/Virtual   [address] [place] [address]  Language: English  Hours: Mon - Sun Appt. Only  Fees: Free   Phone: (770) 967-6668 Email: [email or web address] Website: http://[email or web address]     2  [facility] Action [place] ([facility] Office - Energy Assistance Program Distance: 11.35 miles      Phone/Virtual   [address] [place] [address]  Language: English  Hours: Mon - Fri 8:00 AM - 4:30 PM  Fees: Free   Phone: (375) 780-7198 Email: stormy@[employer] Website: https://www.[facility].org/agency-information          Important Numbers & Websites       Emergency Services   911  [facility] Services   311  Poison Control   (963) 109-2327  Suicide Prevention Lifeline   (797) 223-3482 (TALK)  Child Abuse Hotline   (446) 464-8211 (4-A-Child)  Sexual Assault Hotline   (535) 491-7591 (HOPE)  National Runaway Safeline   (267) 137-3112 (RUNAWAY)  All-Options Talkline   (751) 338-1989  Substance Abuse Referral   (886) 777-3295 (HELP)

## 2023-10-19 NOTE — PATIENT INSTRUCTIONS
On the day of surgery reduce Lantus to 25 units   No Novolog while fasting before surgery   Hold Bumex on the day of surgery

## 2023-10-19 NOTE — PROGRESS NOTES
[facility]  [address] [address]  Phone: [phone]  Primary Provider: Jaleesa Velasquez  Pre-op Performing Provider: JALEESA VELASQUEZ      PREOPERATIVE EVALUATION:  Today's date: 10/19/2023    Divina is a 38 year old female who presents for a preoperative evaluation.      10/19/2023     8:38 AM   Additional Questions   Roomed by og   Accompanied by self         10/19/2023     8:38 AM   Patient Reported Additional Medications   Patient reports taking the following new medications none       Surgical Information:  Surgery/Procedure: carpel tunnel on right arm   Surgery Location: [facility]   Surgeon: DR Solis   Surgery Date: 10/25/23   Time of Surgery: TBD   Where patient plans to recover: At home with family  Fax number for surgical facility: [phone]     Assessment & Plan     The proposed surgical procedure is considered INTERMEDIATE risk.    Preop general physical exam    - Basic metabolic panel  (Ca, Cl, CO2, Creat, Gluc, K, Na, BUN); Future  - CBC with platelets; Future  - Basic metabolic panel  (Ca, Cl, CO2, Creat, Gluc, K, Na, BUN)  - CBC with platelets    Carpal tunnel syndrome of right wrist  -patient is cleared for surgery     Anemia, unspecified type  -improved, hemoglobin level almost within normal range now  -will continue to monitor  -labs acceptable for surgery   -patient has chronically elevated WBC due to asplenia, her CBC counts are stable   - Iron and iron binding capacity  - Ferritin    Type 2 diabetes mellitus with stage 3a chronic kidney disease, with long-term current use of insulin (H)  -improved, A1C within acceptable range for surgery, patient reports that her blood glucose levels at home improved and now ranging below 160-180 per patient report, she is working with endocrinologist and diabetic educator Katiana about getting insulin pump   - Basic metabolic panel  (Ca, Cl, CO2, Creat, Gluc, K, Na, BUN);  Future  - C-peptide  - Glucose  - Basic metabolic panel  (Ca, Cl, CO2, Creat, Gluc, K, Na, BUN)       - No identified additional risk factors other than previously addressed    Antiplatelet or Anticoagulation Medication Instructions:   - Patient is on no antiplatelet or anticoagulation medications.    Additional Medication Instructions:   - Diuretics: HOLD on the day of surgery.   - Long acting insulin (e.g. glargine, detemir): Take 80% of the usual evening or morning dose before surgery.   - short acting insulin (e.g. regular, lispro, aspart): HOLD on the morning of surgery.     RECOMMENDATION:  APPROVAL GIVEN to proceed with proposed procedure, without further diagnostic evaluation.      Subjective       HPI related to upcoming procedure: carpal tunnel         10/19/2023     8:37 AM   Preop Questions   1. Have you ever had a heart attack or stroke? No   2. Have you ever had surgery on your heart or blood vessels, such as a stent placement, a coronary artery bypass, or surgery on an artery in your head, neck, heart, or legs? No   3. Do you have chest pain with activity? No   4. Do you have a history of  heart failure? No   5. Do you currently have a cold, bronchitis or symptoms of other infection? No   6. Do you have a cough, shortness of breath, or wheezing? No   7. Do you or anyone in your family have previous history of blood clots? No   8. Do you or does anyone in your family have a serious bleeding problem such as prolonged bleeding following surgeries or cuts? No   9. Have you ever had problems with anemia or been told to take iron pills? No   10. Have you had any abnormal blood loss such as black, tarry or bloody stools, or abnormal vaginal bleeding? No   11. Have you ever had a blood transfusion? No   12. Are you willing to have a blood transfusion if it is medically needed before, during, or after your surgery? Yes   13. Have you or any of your relatives ever had problems with anesthesia? No   14. Do you  have sleep apnea, excessive snoring or daytime drowsiness? No   15. Do you have any artifical heart valves or other implanted medical devices like a pacemaker, defibrillator, or continuous glucose monitor? No   16. Do you have artificial joints? No   17. Are you allergic to latex? YES   18. Is there any chance that you may be pregnant? No       Health Care Directive:  Patient has a Health Care Directive on file      Preoperative Review of :   reviewed - no record of controlled substances prescribed.      Status of Chronic Conditions:  See problem list for active medical problems.  Problems all longstanding and stable, except as noted/documented.  See ROS for pertinent symptoms related to these conditions.    Review of Systems  Constitutional, neuro, ENT, endocrine, pulmonary, cardiac, gastrointestinal, genitourinary, musculoskeletal, integument and psychiatric systems are negative, except as otherwise noted.    Patient Active Problem List    Diagnosis Date Noted    Chiari malformation type I (H) 06/12/2023     Priority: Medium    Ulnar neuropathy of both upper extremities 09/20/2022     Priority: Medium    Insomnia, unspecified type 08/30/2021     Priority: Medium    History of DVT of arm  (deep vein thrombosis) 01/23/2021     Priority: Medium     ultrasound 1/6/2017-  venous thrombus in the antecubital fossa region and the left forearm as described      Schizoaffective disorder, depressive type (H) 07/19/2019     Priority: Medium    Acquired asplenia 03/22/2019     Priority: Medium    Chronic leukocytosis 11/27/2018     Priority: Medium     Due to spleen removal      Nicotine abuse 08/13/2018     Priority: Medium    Uncontrolled type 2 diabetes mellitus with diabetic polyneuropathy, with long-term current use of insulin 01/24/2018     Priority: Medium    Mild intermittent asthma with acute exacerbation 01/16/2018     Priority: Medium    Morbid obesity (H) 01/09/2018     Priority: Medium    IUD (intrauterine  "device) in place 07/26/2017     Priority: Medium     Mirena IUD inserted in office with premed 7/26/2017        Cervical high risk HPV (human papillomavirus) test positive 06/20/2017     Priority: Medium     6/15/2017 Pap:NIL, +HR HPV \"other\" (not 16/18). Plan to repeat Pap+HPV in 1 year  6/14/2018 Pap: NIL/neg HPV. Plan Pap+HPV in 3 years (2021)  3/26/21 NIL pap, Neg HPV. Plan cotest in 3 years.       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, splenectomy, left adrenalectomy 02/26/2015;      Type 2 diabetes mellitus with stage 3 chronic kidney disease, with long-term current use of insulin (H) 02/27/2017     Priority: Medium    Bipolar disorder (H) 02/27/2017     Priority: Medium    Orthostatic hypotension 01/10/2017     Priority: Medium    Tobacco abuse 01/10/2017     Priority: Medium    Long term (current) use of anticoagulants 01/09/2017     Priority: Medium    Stage 3 chronic kidney disease 12/03/2015     Priority: Medium     Overview:   Updated per 10/1/17 IMO import      Vitamin D insufficiency 06/01/2015     Priority: Medium    Cardiac murmur 08/20/2013     Priority: Medium    Depressive disorder 09/15/2012     Priority: Medium    Hyperlipidemia LDL goal <100 10/31/2010     Priority: Medium    Borderline personality disorder (H) 11/06/2009     Priority: Medium    Essential hypertension 06/13/2008     Priority: Medium    NAFL (nonalcoholic fatty liver) 04/11/2006     Priority: Medium     See flowsheet for hepatic panel.   Stopped zocor, was on godon as well had right upper quandrant ultrasound and ct  ? Psych med related vs SAHNI      Esophageal reflux 04/14/2005     Priority: Medium     GERD      PTSD (post-traumatic stress disorder) 01/01/2001     Priority: Medium      Past Medical History:   Diagnosis Date    Acquired asplenia     Acute pain of left knee 03/22/2019    Acute-on-chronic kidney injury  " 03/22/2019    ARF (acute renal failure) (H24) 03/23/2019    Asthma     Bipolar disorder (H)     Cardiac murmur     Cervical high risk HPV (human papillomavirus) test positive 06/20/2017    Chiari malformation type I (H)     Chronic bilateral low back pain without sciatica     Deep venous thrombosis of arm (H) 01/06/2017    ultrasound 1/6/2017-  venous thrombus in the antecubital fossa region and the left forearm as described    Depressive disorder     DVT (deep venous thrombosis) (H)     DVT of upper extremity (deep vein thrombosis) (H) 2021    Esophageal reflux     GERD    Hypertension     Insomnia     Leukocytosis     Mild intermittent asthma     Morbid obesity (H)     Mucinous neoplasm of pancreas     NAFL (nonalcoholic fatty liver)     Neck pain     Nicotine abuse     Other specified types of schizophrenia, unspecified condition     Schizoaffective disorder, depressive type (H)     Stage 3a chronic kidney disease (CKD) (H)     Type 2 diabetes mellitus (H)     Ulnar neuropathy of both upper extremities     Vitamin D insufficiency      Past Surgical History:   Procedure Laterality Date    ADRENALECTOMY Left 2015    BIOPSY      BREAST SURGERY  2013    COLONOSCOPY      ENT SURGERY  10/01/2000    Tympanoplasty    IR LIVER BIOPSY PERCUTANEOUS  11/14/2019    PANCREATECTOMY PARTIAL  2015    PERCUTANEOUS BIOPSY LIVER Right 11/14/2019    Procedure: Percutaneous Liver Biopsy;  Surgeon: Sean Guzman PA-C;  Location: [facility] OR    SPLENECTOMY  2015    TONSILLECTOMY  10/01/2000    Tonsillectomy     Current Outpatient Medications   Medication Sig Dispense Refill    ACCU-CHEK GUIDE test strip Use to test blood sugar 3 times daily or as directed. 150 strip 1    ARIPiprazole (ABILIFY) 30 MG tablet Take 30 mg by mouth daily      atorvastatin (LIPITOR) 10 MG tablet Take 1 tablet (10 mg) by mouth daily 90 tablet 2    Biotin 5 MG TABS Take 1 tablet by mouth daily 30 tablet 5    blood glucose (NO BRAND SPECIFIED) test strip  Use to test blood sugar 4 times daily or as directed. 100 strip 3    blood glucose (ONETOUCH VERIO IQ) test strip Use to test blood sugar up to 3 times daily 100 strip 11    Blood Glucose Monitoring Suppl (ONETOUCH VERIO FLEX SYSTEM) w/Device KIT 1 each daily 1 kit 0    bumetanide (BUMEX) 0.5 MG tablet Take 1 tablet (0.5 mg) by mouth daily 90 tablet 0    carvedilol (COREG) 12.5 MG tablet Take 1 tablet (12.5 mg) by mouth 2 times daily 180 tablet 3    cloZAPine (CLOZARIL) 100 MG tablet Take 50 mg by mouth 2 times daily      Continuous Blood Gluc Sensor (DEXCOM G6 SENSOR) MISC 1 each every 10 days. Change every 10 days. USE TO READ BLOOD SUGARS PER  INSTRUCTIONS 12 each 1    Continuous Blood Gluc Sensor (GUARDIAN 4 GLUCOSE SENSOR) MISC 1 each every 7 days Change sensor every 7 days 15 each 4    Continuous Blood Gluc Transmit (DEXCOM G6 TRANSMITTER) MISC 1 each every 3 months Change every 3 months. USE TO READ BLOOD SUGARS PER  INSTRUCTIONS 1 each 1    FLUoxetine (PROZAC) 40 MG capsule Take 40 mg by mouth daily      glucose (BD GLUCOSE) 4 g chewable tablet Take 1 tablet by mouth every hour as needed for low blood sugar 30 tablet 4    hydrOXYzine (ATARAX) 25 MG tablet Take 1 Tablet (25 mg) by mouth 3 times daily if needed for Anxiety.      Insulin Aspart FlexPen 100 UNIT/ML SOPN Inject 1-14 Units Subcutaneous 3 times daily (with meals) 3 times daily (with meals) (Use Novlog on meal size small meal 15-30gm carb: 3 units, average meal 45-60gm carb: 5 units, large meal 60gm+ carb: 7 units + pre meal correction 1:40>150. Max dose per 24 hours is 50 units) 30 mL 1    insulin glargine (LANTUS SOLOSTAR) 100 UNIT/ML pen Inject 32 Units Subcutaneous every morning 45 mL 1    insulin pen needle (32G X 6 MM) 32G X 6 MM miscellaneous Use 4 pen needles daily. 150 each 10    insulin pen needle (ULTICARE MINI) 31G X 6 MM miscellaneous USE 1 PEN NEEDLE 4 times daily 200 each 10    lamoTRIgine (LAMICTAL) 100 MG  "tablet Take 100 mg by mouth At Bedtime      loratadine (CLARITIN) 10 MG tablet Take 1 tablet (10 mg) by mouth every morning 28 tablet 10    omeprazole (PRILOSEC) 20 MG DR capsule TAKE 1 CAPSULE BY MOUTH EVERY DAY 28 capsule 2    OneTouch Delica Lancets 33G MISC 1 each 4 times daily 100 each 11    QUEtiapine (SEROQUEL) 25 MG tablet Take 25 mg by mouth 1 tablet 4 times daily as needed for agitation or hallucinations      topiramate (TOPAMAX) 50 MG tablet Take 1 tablet (50 mg) by mouth 2 times daily 60 tablet 3    traZODone (DESYREL) 50 MG tablet       acetaminophen (TYLENOL) 325 MG tablet Take 325-650 mg by mouth 2 tab every 4-6 hours as needed, do not exceed 9 tabs in 24hours (Patient not taking: Reported on 8/9/2023)      albuterol (VENTOLIN HFA) 108 (90 Base) MCG/ACT inhaler INHALE 2 PUFFS INTO THE LUNGS EVERY SIX  HOURS AS NEEDED FOR SHORTNESS OF BREATH (Patient not taking: Reported on 10/11/2023) 18 g 10    Biotin 5000 MCG CAPS Take 1 tablet by mouth daily at 2 pm (Patient not taking: Reported on 8/22/2023)      COMPOUNDED NON-CONTROLLED SUBSTANCE (CMPD RX) - PHARMACY TO MIX COMPOUNDED MEDICATION Chloramphenicol 50 mg, sulfamethoxazole 50 mg, amphotericin B 5 mg, hydrocortisone 1 mg. 2-3 puffs to affected ear PRN itching/drainage (Patient not taking: Reported on 7/13/2023) 10 capsule 1    Continuous Blood Gluc Transmit (GUARDIAN 4 TRANSMITTER) MISC 1 each every 7 days Change sensor every 7 days (Patient not taking: Reported on 8/9/2023) 12 each 4    fluocinolone acetonide 0.01 % OIL Place 4 drops in ear(s) 2 times daily as needed (ear itching) (Patient not taking: Reported on 7/13/2023) 20 mL 3    Insulin Infusion Pump (MINIMED 780G INSULIN PUMP) KIT 1 each daily SmartGuard Target: 100; Active Insulin Time: 2 hours (recommended); SmartGuardTM Warmup/Manual Mode: Suspend before low (recommended) (Patient not taking: Reported on 8/22/2023) 1 kit 0    Insulin Infusion Pump Supplies (EXTENDED INFUSION SET 23\"/6MM) " MISC 1 each every 7 days Max Total Daily Dose 70 units; Change infusion set & reservoir every 7 days (recommended) (Patient not taking: Reported on 10/19/2023) 10 each 6    Insulin Infusion Pump Supplies (EXTENDED RESERVOIR 3ML) MISC 1 each every 7 days (Patient not taking: Reported on 2023) 10 each 11    loperamide (IMODIUM A-D) 2 MG tablet 2 caplets after loose stool then 1 after each loose stool do not exceed 4 in 24hrs (Patient not taking: Reported on 10/11/2023)         Allergies   Allergen Reactions    Acetaminophen Other (See Comments)     pt previously tried to overdose on it, PMD does not want pt taking per pt.     Latex Rash        Social History     Tobacco Use    Smoking status: Former     Types: Vaping Device, Cigarettes     Quit date: 2023     Years since quittin.6     Passive exposure: Yes    Smokeless tobacco: Never    Tobacco comments:     A pack every 3 days   Substance Use Topics    Alcohol use: No       History   Drug Use No         Objective     /70   Pulse 82   Temp 98.7  F (37.1  C) (Tympanic)   Resp 16   Wt 103.6 kg (228 lb 6.4 oz)   SpO2 97%   BMI 39.20 kg/m      Physical Exam    GENERAL APPEARANCE: healthy, alert and no distress     EYES: EOMI, PERRL     HENT: ear canals and TM's normal and nose and mouth without ulcers or lesions     NECK: no adenopathy, no asymmetry, masses, or scars and thyroid normal to palpation     RESP: lungs clear to auscultation - no rales, rhonchi or wheezes     CV: regular rates and rhythm, mild systolic murmur (this is chronic), no peripheral edema      MS: extremities normal- no gross deformities noted, no evidence of inflammation in joints, FROM in all extremities.     SKIN: no suspicious lesions or rashes     NEURO: Normal strength and tone, sensory exam grossly normal, mentation intact and speech normal     PSYCH: mentation appears normal. and affect normal/bright     LYMPHATICS: No cervical adenopathy    Recent Labs   Lab Test  09/29/23  1045 09/13/23  1104 08/30/23  1022 10/26/22  1345 10/19/22  0739   HGB 10.8*  --  11.1*   < > 11.4*     --  378   < > 375   INR  --   --   --   --  0.87   NA  --  139 140   < > 136   POTASSIUM  --  4.9 5.0   < > 5.9*   CR  --  1.41* 1.73*   < > 1.37*   A1C  --  8.3* 8.1*   < > 9.6*    < > = values in this interval not displayed.        Diagnostics:  Recent Results (from the past 24 hour(s))   Iron and iron binding capacity    Collection Time: 10/19/23  9:31 AM   Result Value Ref Range    Iron 86 37 - 145 ug/dL    Iron Binding Capacity 303 240 - 430 ug/dL    Iron Sat Index 28 15 - 46 %   Ferritin    Collection Time: 10/19/23  9:31 AM   Result Value Ref Range    Ferritin 35 6 - 175 ng/mL   Glucose    Collection Time: 10/19/23  9:31 AM   Result Value Ref Range    Glucose 149 (H) 70 - 99 mg/dL    Patient Fasting > 8hrs? Yes    Basic metabolic panel  (Ca, Cl, CO2, Creat, Gluc, K, Na, BUN)    Collection Time: 10/19/23  9:31 AM   Result Value Ref Range    Sodium 142 135 - 145 mmol/L    Potassium 4.6 3.4 - 5.3 mmol/L    Chloride 111 (H) 98 - 107 mmol/L    Carbon Dioxide (CO2) 21 (L) 22 - 29 mmol/L    Anion Gap 10 7 - 15 mmol/L    Urea Nitrogen 18.5 6.0 - 20.0 mg/dL    Creatinine 1.37 (H) 0.51 - 0.95 mg/dL    GFR Estimate 51 (L) >60 mL/min/1.73m2    Calcium 9.2 8.6 - 10.0 mg/dL    Glucose 149 (H) 70 - 99 mg/dL   CBC with platelets    Collection Time: 10/19/23  9:31 AM   Result Value Ref Range    WBC Count 16.5 (H) 4.0 - 11.0 10e3/uL    RBC Count 3.85 3.80 - 5.20 10e6/uL    Hemoglobin 11.2 (L) 11.7 - 15.7 g/dL    Hematocrit 37.3 35.0 - 47.0 %    MCV 97 78 - 100 fL    MCH 29.1 26.5 - 33.0 pg    MCHC 30.0 (L) 31.5 - 36.5 g/dL    RDW 14.1 10.0 - 15.0 %    Platelet Count 424 150 - 450 10e3/uL      No EKG required, no history of coronary heart disease, significant arrhythmia, peripheral arterial disease or other structural heart disease.    Revised Cardiac Risk Index (RCRI):  The patient has the following serious  cardiovascular risks for perioperative complications:   - No serious cardiac risks = 0 points     RCRI Interpretation: 0 points: Class I (very low risk - 0.4% complication rate)         Signed Electronically by: VERONIQUE Spears CNP  Copy of this evaluation report is provided to requesting physician.

## 2023-10-19 NOTE — COMMUNITY RESOURCES LIST (ENGLISH)
10/19/2023   [facility] - Outpatient Clinics  N/A  For additional resource needs, please contact your health insurance member services or your primary care team.  Phone: [phone]   Email: N/A   Address: [facility] [place] [address]   Hours: N/A        Financial Stability       Utility payment assistance  1  [place] [employer] - [place]'s Telephone Assistance Plan (TAP) and Federal Lifeline and Affordable Connectivity Program (ACP) Distance: 28.07 miles      Phone/Virtual   [address] [place] [address]  Language: English  Fees: Free   Phone: (551) 311-8556 Email: consumer.puc@[facility].mn. Website: https://[place].gov/puc/consumers/telephone/     2  [place] [employer] - Energy and Utilities Distance: 31.17 miles      In-Person, Phone/Virtual   [address] [place] [address]  Language: English  Hours: Mon - Fri 8:30 AM - 4:30 PM  Fees: Free   Phone: (129) 265-1761 Website: https://[place].gov/My [employer]/energy/consumer-assistance/energy-assistance-program/          Important Numbers & Websites       [facility]   211 211itedway.org  Poison Control   (439) 123-5165 [place]poison.[place]  Suicide and Crisis Lifeline   [address] [place] Healthcareline.org  Childhelp [place] Child Abuse Hotline   179.307.2918 Childhelphotline.org  National Sexual Assault Hotline   (500) 263-7240 (HOPE) Rainn.org  National Runaway Safeline   (586) 861-3087 (RUNAWAY) 1800runaway.org  Pregnancy & Postpartum Support [place]   Call/text 973-885-4886 Ppsupport[place].org  Substance Abuse National Helpline ([facility]   752-328-HELP (3105) Findtreatment.gov  Emergency Services   911

## 2023-10-20 ENCOUNTER — MYC MEDICAL ADVICE (OUTPATIENT)
Dept: FAMILY MEDICINE | Facility: CLINIC | Age: 38
End: 2023-10-20
Payer: COMMERCIAL

## 2023-10-20 DIAGNOSIS — Z79.899 HIGH RISK MEDICATION USE: Primary | ICD-10-CM

## 2023-10-20 DIAGNOSIS — F25.0 SCHIZOAFFECTIVE DISORDER, BIPOLAR TYPE (H): ICD-10-CM

## 2023-10-20 LAB
BASO+EOS+MONOS # BLD AUTO: ABNORMAL 10*3/UL
BASO+EOS+MONOS NFR BLD AUTO: ABNORMAL %
BASOPHILS # BLD AUTO: 0.2 10E3/UL (ref 0–0.2)
BASOPHILS NFR BLD AUTO: 1 %
EOSINOPHIL # BLD AUTO: 0.4 10E3/UL (ref 0–0.7)
EOSINOPHIL NFR BLD AUTO: 3 %
ERYTHROCYTE [DISTWIDTH] IN BLOOD BY AUTOMATED COUNT: 13.7 % (ref 10–15)
HCT VFR BLD AUTO: 37.5 % (ref 35–47)
HGB BLD-MCNC: 11.3 G/DL (ref 11.7–15.7)
IMM GRANULOCYTES # BLD: 0.2 10E3/UL
IMM GRANULOCYTES NFR BLD: 1 %
LYMPHOCYTES # BLD AUTO: 4.4 10E3/UL (ref 0.8–5.3)
LYMPHOCYTES NFR BLD AUTO: 26 %
MCH RBC QN AUTO: 29.4 PG (ref 26.5–33)
MCHC RBC AUTO-ENTMCNC: 30.1 G/DL (ref 31.5–36.5)
MCV RBC AUTO: 97 FL (ref 78–100)
MONOCYTES # BLD AUTO: 0.9 10E3/UL (ref 0–1.3)
MONOCYTES NFR BLD AUTO: 6 %
NEUTROPHILS # BLD AUTO: 10.9 10E3/UL (ref 1.6–8.3)
NEUTROPHILS NFR BLD AUTO: 64 %
PLATELET # BLD AUTO: 429 10E3/UL (ref 150–450)
RBC # BLD AUTO: 3.85 10E6/UL (ref 3.8–5.2)
WBC # BLD AUTO: 16.9 10E3/UL (ref 4–11)

## 2023-10-26 ENCOUNTER — VIRTUAL VISIT (OUTPATIENT)
Dept: GASTROENTEROLOGY | Facility: CLINIC | Age: 38
End: 2023-10-26
Attending: INTERNAL MEDICINE
Payer: COMMERCIAL

## 2023-10-26 DIAGNOSIS — E66.01 MORBID OBESITY (H): Chronic | ICD-10-CM

## 2023-10-26 DIAGNOSIS — E11.42 TYPE 2 DIABETES MELLITUS WITH DIABETIC POLYNEUROPATHY, WITH LONG-TERM CURRENT USE OF INSULIN (H): Primary | ICD-10-CM

## 2023-10-26 DIAGNOSIS — Z79.899 HIGH RISK MEDICATION USE: ICD-10-CM

## 2023-10-26 DIAGNOSIS — Z79.4 TYPE 2 DIABETES MELLITUS WITH DIABETIC POLYNEUROPATHY, WITH LONG-TERM CURRENT USE OF INSULIN (H): Primary | ICD-10-CM

## 2023-10-26 DIAGNOSIS — K76.0 NAFL (NONALCOHOLIC FATTY LIVER): Chronic | ICD-10-CM

## 2023-10-26 PROCEDURE — 99213 OFFICE O/P EST LOW 20 MIN: CPT | Mod: VID | Performed by: INTERNAL MEDICINE

## 2023-10-26 NOTE — PROGRESS NOTES
Virtual Visit Details    Type of service:  Video Visit   Video Start Time: 10:05 AM  Video End Time:10:18 AM    Originating Location (pt. 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    Since last seen she reports that she is otherwise doing pretty well.  Notes that she continues to live on her own with her cat.  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    She is working closely with her primary team with regards to management of her diabetes and hypertension.  Noted that her hemoglobin A1c is improved but still greater than 7%.  Notes that there have been some concerns about overmedication for hypertension, although multiple clinic visits with evidence of hypertension.    She has not had any significant changes in her weight recently    She continues to work with her mental health team with regard to her medications: Noted that her dose of Clozaril has weaned down, currently on 25 mg every morning and 50 mg every afternoon    Reports that she underwent a carpal tunnel surgery yesterday on her right upper extremity    We had long discussions today about lifestyle modifications including diet and exercise    Past Medical History:  Past Medical History:   Diagnosis Date    Acquired asplenia     Acute pain of left knee 03/22/2019    Acute-on-chronic kidney injury   2019    ARF (acute renal failure) (H24) 2019    Asthma     Bipolar disorder (H)     Cardiac murmur     Cervical high risk HPV (human papillomavirus) test positive 2017    Chiari malformation type I (H)     Chronic bilateral low back pain without sciatica     Deep venous thrombosis of arm (H) 2017    ultrasound 2017-  venous thrombus in the antecubital fossa region and the left forearm as described    Depressive disorder     DVT (deep venous thrombosis) (H)     DVT of upper extremity (deep vein thrombosis) (H)     Esophageal reflux     GERD    Hypertension     Insomnia     Leukocytosis     Mild intermittent asthma     Morbid obesity (H)     Mucinous neoplasm of pancreas     NAFL (nonalcoholic fatty liver)     Neck pain     Nicotine abuse     Other specified types of schizophrenia, unspecified condition     Schizoaffective disorder, depressive type (H)     Stage 3a chronic kidney disease (CKD) (H)     Type 2 diabetes mellitus (H)     Ulnar neuropathy of both upper extremities     Vitamin D insufficiency        Surgical History:  Past Surgical History:   Procedure Laterality Date    ADRENALECTOMY Left 2015    BIOPSY      BREAST SURGERY  2013    COLONOSCOPY      ENT SURGERY  10/01/2000    Tympanoplasty    IR LIVER BIOPSY PERCUTANEOUS  2019    PANCREATECTOMY PARTIAL  2015    PERCUTANEOUS BIOPSY LIVER Right 2019    Procedure: Percutaneous Liver Biopsy;  Surgeon: Sean Guzman PA-C;  Location: [facility] OR    SPLENECTOMY      TONSILLECTOMY  10/01/2000    Tonsillectomy       Social History:  Social History     Tobacco Use    Smoking status: Former     Types: Vaping Device, Cigarettes     Quit date: 2023     Years since quittin.7     Passive exposure: Yes    Smokeless tobacco: Never    Tobacco comments:     A pack every 3 days   Vaping Use    Vaping Use: Every day    Substances: Nicotine, Flavoring    Devices: Disposable   Substance Use Topics    Alcohol use: No     Drug use: No        Family History:  Family History   Problem Relation Age of Onset    Respiratory Mother         ASTHMA    Allergies Mother     Depression Mother     Chronic Obstructive Pulmonary Disease Mother     Anxiety Disorder Mother     Mental Illness Mother     Asthma Mother     Heart Disease Father         HEART VALVE REPLACEMENT    Hypertension Father     Diabetes Father     Depression Father     Anxiety Disorder Father     Mental Illness Father     Obesity Father     Respiratory Sister     Allergies Sister     Depression Sister     Respiratory Sister     Allergies Sister     Depression Sister     Respiratory Brother     Allergies Brother     Kidney Disease No family hx of        Medications:  Current Outpatient Medications   Medication    ACCU-CHEK GUIDE test strip    acetaminophen (TYLENOL) 325 MG tablet    albuterol (VENTOLIN HFA) 108 (90 Base) MCG/ACT inhaler    ARIPiprazole (ABILIFY) 30 MG tablet    atorvastatin (LIPITOR) 10 MG tablet    Biotin 5 MG TABS    Biotin 5000 MCG CAPS    blood glucose (NO BRAND SPECIFIED) test strip    blood glucose (ONETOUCH VERIO IQ) test strip    Blood Glucose Monitoring Suppl (ONETOUCH VERIO FLEX SYSTEM) w/Device KIT    bumetanide (BUMEX) 0.5 MG tablet    carvedilol (COREG) 12.5 MG tablet    cloZAPine (CLOZARIL) 100 MG tablet    COMPOUNDED NON-CONTROLLED SUBSTANCE (CMPD RX) - PHARMACY TO MIX COMPOUNDED MEDICATION    Continuous Blood Gluc Sensor (DEXCOM G6 SENSOR) MISC    Continuous Blood Gluc Sensor (GUARDIAN 4 GLUCOSE SENSOR) MISC    Continuous Blood Gluc Transmit (DEXCOM G6 TRANSMITTER) MISC    Continuous Blood Gluc Transmit (GUARDIAN 4 TRANSMITTER) MISC    fluocinolone acetonide 0.01 % OIL    FLUoxetine (PROZAC) 40 MG capsule    glucose (BD GLUCOSE) 4 g chewable tablet    hydrOXYzine (ATARAX) 25 MG tablet    Insulin Aspart FlexPen 100 UNIT/ML SOPN    insulin glargine (LANTUS SOLOSTAR) 100 UNIT/ML pen    Insulin Infusion Pump (MINIMED 780G INSULIN PUMP) KIT     "Insulin Infusion Pump Supplies (EXTENDED INFUSION SET 23\"/6MM) MISC    Insulin Infusion Pump Supplies (EXTENDED RESERVOIR 3ML) [facility]    insulin pen needle (32G X 6 MM) 32G X 6 MM miscellaneous    insulin pen needle (ULTICARE MINI) 31G X 6 MM miscellaneous    lamoTRIgine (LAMICTAL) 100 MG tablet    loperamide (IMODIUM A-D) 2 MG tablet    loratadine (CLARITIN) 10 MG tablet    omeprazole (PRILOSEC) 20 MG DR capsule    OneTouch Delica Lancets 33G MISC    QUEtiapine (SEROQUEL) 25 MG tablet    topiramate (TOPAMAX) 50 MG tablet    traZODone (DESYREL) 50 MG tablet     No current facility-administered medications for this visit.       Review of Systems  A complete 10 point review of systems was asked and answered in the negative unless specifically commented upon in the HPI    Objective:         There were no vitals filed for this visit.    There is no height or weight on file to calculate BMI.     Physical Exam    Vitals reviewed.   Constitutional: Well-developed, well-nourished, in no apparent distress.    HEENT: Normocephalic. no scleral icterus.  Respiratory: Normal respiratory excursion  Skin:  No jaundice.  Musculoskeletal:  ROM intact, normal muscle bulk    Psychiatric: Normal mood and affect. Behavior is normal.    Labs and Diagnostic tests:    reviewed    Imaging:  CT Abd 6/22  FINDINGS:   LOWER CHEST: Normal.     HEPATOBILIARY: Technically indeterminate 7 mm low-attenuation lesion in the right hepatic lobe superiorly on series 5, image 13. Liver elsewhere is negative. No calcified gallstones or biliary dilatation.     PANCREAS: Stable postoperative changes of distal pancreatectomy. No surrounding inflammatory changes or fluid collections. No pancreatic ductal dilatation.     SPLEEN: Surgically absent.     ADRENAL GLANDS: Left adrenal gland is absent. Normal right adrenal gland.     KIDNEYS/BLADDER: Lobulated renal contours bilaterally. No significant focal renal lesions. No hydronephrosis. Bladder is unremarkable.   "   BOWEL: Bowel is normal in caliber with no evidence for obstruction. Normal appendix. No definite acute bowel findings.     LYMPH NODES: Normal.     VASCULATURE: Unremarkable.     PELVIC ORGANS: IUD within the uterus.     MUSCULOSKELETAL: Normal.      Procedures:  Liver biopsy: 11/14/2019  FINAL DIAGNOSIS:   LIVER, NEEDLE BIOPSY:   - Mild ductopenia with no evidence of active parenchymal or active biliary    tract disease.    MICROSCOPIC:   - Core needle biopsies are adequate for evaluation and show liver parenchyma with approximately 18 portal tracts.    - H&E sections show hepatic lobules with minimal steatosis (<5%)   and no ballooning degeneration, inflammation, or cholestasis.   - The portal tracts are remarkable for ductopenia, with absent bile ducts in   approximately 5 of the portal tracts (highlighted with CK7   immunohistochemistry), along with ceroid-laden macrophages.    - CK7 also stains periportal hepatocytes in areas of ductopenia.  The portal tracts do not contain significant inflammation or show interface activity.  - Ductular proliferation is not a prominent feature.   Florid duct lesions are not present.  There is no evidence of significant   fibrosis on trichrome stain.   -Special stain for reticulin shows preserved liver plate architecture.     The main finding in this biopsy is the presence of mild ductopenia,   without evidence of active duct injury.   An underlying cause for the ductopenia is not apparent in this biopsy, but    may have been secondary to an episode of prior bile duct injury (e.g. drug reaction) or idiopathic adult ductopenia.    There is only minimal steatosis in this biopsy and there is no evidence of steatohepatitis or significant fibrosis.       Assessment and Plan:    Abnormal Liver Tests:    -Based on review of information, and is documented in previous notes, felt that her abnormal liver tests are likely multifactorial: Secondary to metabolic associated steatotic liver  disease and potential contribution of clozapine.  -Managements of metabolic associated fatty liver disease as per below  -Noted that she is working closely with her mental health providers with regards to her medications.  As commented previously: Ultimately the use of clozapine, which is a noted medication for refractory hallucinations and psychosis, he is deferred to her psychiatrist.  I do believe that well controlled mental health is paramount, even noting risks of ongoing liver irritation  -Outside of the management metabolic associated liver disease as per below, no overt interventions from hepatology perspective  -Noted liver biopsy from 2019 without significant degree of hepatic fibrosis    Steatotic Liver Disease (SLD, formerly referred to as Non-alcoholic Fatty Liver Disease)  - I had a long discussion with the patient about metabolic dysfunction-associated steatotic liver disease (MASLD).  We discussed how fat deposits in the liver, how this leads to inflammation, and how chronic inflammation (metabolic dysfunction-associated steatohepatitis/MASH) can ultimately lead to scarring and cirrhosis.  The long-term complications of fatty liver disease were discussed with the patient, including the increased risk of cardiovascular disease complications,the risk of developing diabetes (if not already), and variable progression to cirrhosis and end stage liver disease.  Management of MASLD/MASH  - We spent time discussing necessary lifestyle modifications including: appropriate diet, exercise and weight loss plan.      - Recommend exercise regularly: at least 4 times per week, with a minimum of 40-45 minutes per day.      - It has shown that patients who exercise regularly can have improvement of insulin resistance, increase in baseline metabolic activity and resolution of fatty liver disease (even if  appreciable weight loss does not occur)    - Recommend a low-carbohydrate, low-calorie diet (~1700 calories per  day).    - An ideal weight loss plan would be to lose 7-10% of body weight over the next six months.  This has been proven to resolve fat and inflammation in the liver, and can lead to regression of scarring that might be present  - If the patient has diabetes, we recommend assertive management: ideal goal Hgb A1c goal of =/< 7%.     - There are no overt contraindications to medications used in the treatment of diabetes in persons with chronic liver disease  - Management of cholesterol is also very important.    - The use of statins are an effective means of therapy and are not contra-indicated in those with abnormal liver tests OR those with cirrhosis.  The value of these medications in this population far outweigh the minor risks of abnormal liver tests.   - Goal LDL in those with SAHNI are < 100 mg/dL  - Consider the utility of liberalizing coffee consumption as some data that this may slow progression and reverse effects of SAHNI-related fibrosis.  - Consider a referral to the [facility]      Follow Up:   PRN     Thank you very much for the opportunity to participate in the care of this patient.  If you have any further questions, please don't hesitate to contact our office.    Thomas M. Leventhal, M.D.   of Medicine  Advanced & Transplant Hepatology  [facility]

## 2023-10-26 NOTE — NURSING NOTE
Is the patient currently in the state of [place]? YES    Visit mode:VIDEO    If the visit is dropped, the patient can be reconnected by: VIDEO VISIT: Text to cell phone:   Telephone Information:   Mobile [phone]       Will anyone else be joining the visit? NO  (If patient encounters technical issues they should call [phone]    How would you like to obtain your AVS? MyChart    Are changes needed to the allergy or medication list? No    Reason for visit: RECHECK    Jonathan GILL

## 2023-10-26 NOTE — LETTER
10/26/2023         RE: Divina Delgadillo  [id number] [facility] [address]        Dear Colleague,    Thank you for referring your patient, Divina Delgadillo, to the [facility] [place]. Please see a copy of my visit note below.    Virtual Visit Details    Type of service:  Video Visit   Video Start Time: 10:05 AM  Video End Time:10:18 AM    Originating Location (pt. Location): Home    Distant Location (provider location):  On-site  Platform used for Video Visit: 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    Since last seen she reports that she is otherwise doing pretty well.  Notes that she continues to live on her own with her cat.  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    She is working closely with her primary team with regards to management of her diabetes and hypertension.  Noted that her hemoglobin A1c is improved but still greater than 7%.  Notes that there have been some concerns about overmedication for hypertension, although multiple clinic visits with evidence of hypertension.    She has not had any significant changes in her weight recently    She continues to work with her mental health team with regard to her medications: Noted that her dose of Clozaril has weaned down, currently on 25 mg every morning and 50 mg every afternoon    Reports that she underwent a carpal tunnel surgery yesterday on her  right upper extremity    We had long discussions today about lifestyle modifications including diet and exercise    Past Medical History:  Past Medical History:   Diagnosis Date    Acquired asplenia     Acute pain of left knee 03/22/2019    Acute-on-chronic kidney injury  03/22/2019    ARF (acute renal failure) (H24) 03/23/2019    Asthma     Bipolar disorder (H)     Cardiac murmur     Cervical high risk HPV (human papillomavirus) test positive 06/20/2017    Chiari malformation type I (H)     Chronic bilateral low back pain without sciatica     Deep venous thrombosis of arm (H) 01/06/2017    ultrasound 1/6/2017-  venous thrombus in the antecubital fossa region and the left forearm as described    Depressive disorder     DVT (deep venous thrombosis) (H)     DVT of upper extremity (deep vein thrombosis) (H) 2021    Esophageal reflux     GERD    Hypertension     Insomnia     Leukocytosis     Mild intermittent asthma     Morbid obesity (H)     Mucinous neoplasm of pancreas     NAFL (nonalcoholic fatty liver)     Neck pain     Nicotine abuse     Other specified types of schizophrenia, unspecified condition     Schizoaffective disorder, depressive type (H)     Stage 3a chronic kidney disease (CKD) (H)     Type 2 diabetes mellitus (H)     Ulnar neuropathy of both upper extremities     Vitamin D insufficiency        Surgical History:  Past Surgical History:   Procedure Laterality Date    ADRENALECTOMY Left 2015    BIOPSY      BREAST SURGERY  2013    COLONOSCOPY      ENT SURGERY  10/01/2000    Tympanoplasty    IR LIVER BIOPSY PERCUTANEOUS  11/14/2019    PANCREATECTOMY PARTIAL  2015    PERCUTANEOUS BIOPSY LIVER Right 11/14/2019    Procedure: Percutaneous Liver Biopsy;  Surgeon: Sean Guzman PA-C;  Location: [facility] OR    SPLENECTOMY  2015    TONSILLECTOMY  10/01/2000    Tonsillectomy       Social History:  Social History     Tobacco Use    Smoking status: Former     Types: Vaping Device, Cigarettes     Quit date:  2023     Years since quittin.7     Passive exposure: Yes    Smokeless tobacco: Never    Tobacco comments:     A pack every 3 days   Vaping Use    Vaping Use: Every day    Substances: Nicotine, Flavoring    Devices: Disposable   Substance Use Topics    Alcohol use: No    Drug use: No        Family History:  Family History   Problem Relation Age of Onset    Respiratory Mother         ASTHMA    Allergies Mother     Depression Mother     Chronic Obstructive Pulmonary Disease Mother     Anxiety Disorder Mother     Mental Illness Mother     Asthma Mother     Heart Disease Father         HEART VALVE REPLACEMENT    Hypertension Father     Diabetes Father     Depression Father     Anxiety Disorder Father     Mental Illness Father     Obesity Father     Respiratory Sister     Allergies Sister     Depression Sister     Respiratory Sister     Allergies Sister     Depression Sister     Respiratory Brother     Allergies Brother     Kidney Disease No family hx of        Medications:  Current Outpatient Medications   Medication    ACCU-CHEK GUIDE test strip    acetaminophen (TYLENOL) 325 MG tablet    albuterol (VENTOLIN HFA) 108 (90 Base) MCG/ACT inhaler    ARIPiprazole (ABILIFY) 30 MG tablet    atorvastatin (LIPITOR) 10 MG tablet    Biotin 5 MG TABS    Biotin 5000 MCG CAPS    blood glucose (NO BRAND SPECIFIED) test strip    blood glucose (ONETOUCH VERIO IQ) test strip    Blood Glucose Monitoring Suppl (ONETOUCH VERIO FLEX SYSTEM) w/Device KIT    bumetanide (BUMEX) 0.5 MG tablet    carvedilol (COREG) 12.5 MG tablet    cloZAPine (CLOZARIL) 100 MG tablet    COMPOUNDED NON-CONTROLLED SUBSTANCE (CMPD RX) - PHARMACY TO MIX COMPOUNDED MEDICATION    Continuous Blood Gluc Sensor (DEXCOM G6 SENSOR) MISC    Continuous Blood Gluc Sensor (GUARDIAN 4 GLUCOSE SENSOR) MISC    Continuous Blood Gluc Transmit (DEXCOM G6 TRANSMITTER) MISC    Continuous Blood Gluc Transmit (GUARDIAN 4 TRANSMITTER) MISC    fluocinolone acetonide 0.01 % OIL     "FLUoxetine (PROZAC) 40 MG capsule    glucose (BD GLUCOSE) 4 g chewable tablet    hydrOXYzine (ATARAX) 25 MG tablet    Insulin Aspart FlexPen 100 UNIT/ML SOPN    insulin glargine (LANTUS SOLOSTAR) 100 UNIT/ML pen    Insulin Infusion Pump (MINIMED 780G INSULIN PUMP) KIT    Insulin Infusion Pump Supplies (EXTENDED INFUSION SET 23\"/6MM) MISC    Insulin Infusion Pump Supplies (EXTENDED RESERVOIR 3ML) MISC    insulin pen needle (32G X 6 MM) 32G X 6 MM miscellaneous    insulin pen needle (ULTICARE MINI) 31G X 6 MM miscellaneous    lamoTRIgine (LAMICTAL) 100 MG tablet    loperamide (IMODIUM A-D) 2 MG tablet    loratadine (CLARITIN) 10 MG tablet    omeprazole (PRILOSEC) 20 MG DR capsule    OneTouch Delica Lancets 33G MISC    QUEtiapine (SEROQUEL) 25 MG tablet    topiramate (TOPAMAX) 50 MG tablet    traZODone (DESYREL) 50 MG tablet     No current facility-administered medications for this visit.       Review of Systems  A complete 10 point review of systems was asked and answered in the negative unless specifically commented upon in the HPI    Objective:         There were no vitals filed for this visit.    There is no height or weight on file to calculate BMI.     Physical Exam    Vitals reviewed.   Constitutional: Well-developed, well-nourished, in no apparent distress.    HEENT: Normocephalic. no scleral icterus.  Respiratory: Normal respiratory excursion  Skin:  No jaundice.  Musculoskeletal:  ROM intact, normal muscle bulk    Psychiatric: Normal mood and affect. Behavior is normal.    Labs and Diagnostic tests:    reviewed    Imaging:  CT Abd 6/22  FINDINGS:   LOWER CHEST: Normal.     HEPATOBILIARY: Technically indeterminate 7 mm low-attenuation lesion in the right hepatic lobe superiorly on series 5, image 13. Liver elsewhere is negative. No calcified gallstones or biliary dilatation.     PANCREAS: Stable postoperative changes of distal pancreatectomy. No surrounding inflammatory changes or fluid collections. No " pancreatic ductal dilatation.     SPLEEN: Surgically absent.     ADRENAL GLANDS: Left adrenal gland is absent. Normal right adrenal gland.     KIDNEYS/BLADDER: Lobulated renal contours bilaterally. No significant focal renal lesions. No hydronephrosis. Bladder is unremarkable.     BOWEL: Bowel is normal in caliber with no evidence for obstruction. Normal appendix. No definite acute bowel findings.     LYMPH NODES: Normal.     VASCULATURE: Unremarkable.     PELVIC ORGANS: IUD within the uterus.     MUSCULOSKELETAL: Normal.      Procedures:  Liver biopsy: 11/14/2019  FINAL DIAGNOSIS:   LIVER, NEEDLE BIOPSY:   - Mild ductopenia with no evidence of active parenchymal or active biliary    tract disease.    MICROSCOPIC:   - Core needle biopsies are adequate for evaluation and show liver parenchyma with approximately 18 portal tracts.    - H&E sections show hepatic lobules with minimal steatosis (<5%)   and no ballooning degeneration, inflammation, or cholestasis.   - The portal tracts are remarkable for ductopenia, with absent bile ducts in   approximately 5 of the portal tracts (highlighted with CK7   immunohistochemistry), along with ceroid-laden macrophages.    - CK7 also stains periportal hepatocytes in areas of ductopenia.  The portal tracts do not contain significant inflammation or show interface activity.  - Ductular proliferation is not a prominent feature.   Florid duct lesions are not present.  There is no evidence of significant   fibrosis on trichrome stain.   -Special stain for reticulin shows preserved liver plate architecture.     The main finding in this biopsy is the presence of mild ductopenia,   without evidence of active duct injury.   An underlying cause for the ductopenia is not apparent in this biopsy, but    may have been secondary to an episode of prior bile duct injury (e.g. drug reaction) or idiopathic adult ductopenia.    There is only minimal steatosis in this biopsy and there is no evidence  of steatohepatitis or significant fibrosis.       Assessment and Plan:    Abnormal Liver Tests:    -Based on review of information, and is documented in previous notes, felt that her abnormal liver tests are likely multifactorial: Secondary to metabolic associated steatotic liver disease and potential contribution of clozapine.  -Managements of metabolic associated fatty liver disease as per below  -Noted that she is working closely with her mental health providers with regards to her medications.  As commented previously: Ultimately the use of clozapine, which is a noted medication for refractory hallucinations and psychosis, he is deferred to her psychiatrist.  I do believe that well controlled mental health is paramount, even noting risks of ongoing liver irritation  -Outside of the management metabolic associated liver disease as per below, no overt interventions from hepatology perspective  -Noted liver biopsy from 2019 without significant degree of hepatic fibrosis    Steatotic Liver Disease (SLD, formerly referred to as Non-alcoholic Fatty Liver Disease)  - I had a long discussion with the patient about metabolic dysfunction-associated steatotic liver disease (MASLD).  We discussed how fat deposits in the liver, how this leads to inflammation, and how chronic inflammation (metabolic dysfunction-associated steatohepatitis/MASH) can ultimately lead to scarring and cirrhosis.  The long-term complications of fatty liver disease were discussed with the patient, including the increased risk of cardiovascular disease complications,the risk of developing diabetes (if not already), and variable progression to cirrhosis and end stage liver disease.  Management of MASLD/MASH  - We spent time discussing necessary lifestyle modifications including: appropriate diet, exercise and weight loss plan.      - Recommend exercise regularly: at least 4 times per week, with a minimum of 40-45 minutes per day.      - It has shown  that patients who exercise regularly can have improvement of insulin resistance, increase in baseline metabolic activity and resolution of fatty liver disease (even if  appreciable weight loss does not occur)    - Recommend a low-carbohydrate, low-calorie diet (~1700 calories per day).    - An ideal weight loss plan would be to lose 7-10% of body weight over the next six months.  This has been proven to resolve fat and inflammation in the liver, and can lead to regression of scarring that might be present  - If the patient has diabetes, we recommend assertive management: ideal goal Hgb A1c goal of =/< 7%.     - There are no overt contraindications to medications used in the treatment of diabetes in persons with chronic liver disease  - Management of cholesterol is also very important.    - The use of statins are an effective means of therapy and are not contra-indicated in those with abnormal liver tests OR those with cirrhosis.  The value of these medications in this population far outweigh the minor risks of abnormal liver tests.   - Goal LDL in those with SAHNI are < 100 mg/dL  - Consider the utility of liberalizing coffee consumption as some data that this may slow progression and reverse effects of SAHNI-related fibrosis.  - Consider a referral to the [facility]      Follow Up:   PRN     Thank you very much for the opportunity to participate in the care of this patient.  If you have any further questions, please don't hesitate to contact our office.    Thomas M. Leventhal, M.D.   of Medicine  Advanced & Transplant Hepatology  [facility]

## 2023-10-27 DIAGNOSIS — D72.828 ACQUIRED NEUTROPHILIA: Primary | ICD-10-CM

## 2023-11-01 ENCOUNTER — DOCUMENTATION ONLY (OUTPATIENT)
Dept: OTHER | Facility: CLINIC | Age: 38
End: 2023-11-01
Payer: COMMERCIAL

## 2023-11-07 ENCOUNTER — LAB (OUTPATIENT)
Dept: LAB | Facility: CLINIC | Age: 38
End: 2023-11-07
Payer: COMMERCIAL

## 2023-11-07 DIAGNOSIS — F25.0 SCHIZOAFFECTIVE DISORDER, BIPOLAR TYPE (H): ICD-10-CM

## 2023-11-07 DIAGNOSIS — Z79.899 HIGH RISK MEDICATION USE: ICD-10-CM

## 2023-11-07 LAB
ACANTHOCYTES BLD QL SMEAR: NORMAL
AUER BODIES BLD QL SMEAR: NORMAL
BASO STIPL BLD QL SMEAR: NORMAL
BASOPHILS # BLD AUTO: 0.1 10E3/UL (ref 0–0.2)
BASOPHILS NFR BLD AUTO: 1 %
BITE CELLS BLD QL SMEAR: NORMAL
BLISTER CELLS BLD QL SMEAR: NORMAL
BURR CELLS BLD QL SMEAR: NORMAL
DACRYOCYTES BLD QL SMEAR: NORMAL
ELLIPTOCYTES BLD QL SMEAR: NORMAL
EOSINOPHIL # BLD AUTO: 0.4 10E3/UL (ref 0–0.7)
EOSINOPHIL NFR BLD AUTO: 3 %
ERYTHROCYTE [DISTWIDTH] IN BLOOD BY AUTOMATED COUNT: 13.9 % (ref 10–15)
FRAGMENTS BLD QL SMEAR: NORMAL
HCT VFR BLD AUTO: 38 % (ref 35–47)
HGB BLD-MCNC: 11.8 G/DL (ref 11.7–15.7)
HGB C CRYSTALS: NORMAL
HOWELL-JOLLY BOD BLD QL SMEAR: NORMAL
IMM GRANULOCYTES # BLD: 0.2 10E3/UL
IMM GRANULOCYTES NFR BLD: 1 %
LYMPHOCYTES # BLD AUTO: 5.9 10E3/UL (ref 0.8–5.3)
LYMPHOCYTES NFR BLD AUTO: 38 %
MCH RBC QN AUTO: 29.8 PG (ref 26.5–33)
MCHC RBC AUTO-ENTMCNC: 31.1 G/DL (ref 31.5–36.5)
MCV RBC AUTO: 96 FL (ref 78–100)
MONOCYTES # BLD AUTO: 1.1 10E3/UL (ref 0–1.3)
MONOCYTES NFR BLD AUTO: 7 %
NEUTROPHILS # BLD AUTO: 7.7 10E3/UL (ref 1.6–8.3)
NEUTROPHILS NFR BLD AUTO: 50 %
NEUTS HYPERSEG BLD QL SMEAR: NORMAL
NRBC # BLD AUTO: 0 10E3/UL
NRBC BLD AUTO-RTO: 0 /100
PLAT MORPH BLD: NORMAL
PLATELET # BLD AUTO: 404 10E3/UL (ref 150–450)
POLYCHROMASIA BLD QL SMEAR: NORMAL
RBC # BLD AUTO: 3.96 10E6/UL (ref 3.8–5.2)
RBC AGGLUT BLD QL: NORMAL
RBC MORPH BLD: NORMAL
ROULEAUX BLD QL SMEAR: NORMAL
SICKLE CELLS BLD QL SMEAR: NORMAL
SMUDGE CELLS BLD QL SMEAR: NORMAL
SPHEROCYTES BLD QL SMEAR: NORMAL
STOMATOCYTES BLD QL SMEAR: NORMAL
TARGETS BLD QL SMEAR: NORMAL
TOXIC GRANULES BLD QL SMEAR: NORMAL
VARIANT LYMPHS BLD QL SMEAR: NORMAL
WBC # BLD AUTO: 15.4 10E3/UL (ref 4–11)

## 2023-11-07 PROCEDURE — 85025 COMPLETE CBC W/AUTO DIFF WBC: CPT

## 2023-11-07 PROCEDURE — 36415 COLL VENOUS BLD VENIPUNCTURE: CPT

## 2023-11-08 ENCOUNTER — VIRTUAL VISIT (OUTPATIENT)
Dept: PHARMACY | Facility: CLINIC | Age: 38
End: 2023-11-08
Payer: COMMERCIAL

## 2023-11-08 ENCOUNTER — HOSPITAL ENCOUNTER (OUTPATIENT)
Dept: CARDIOLOGY | Facility: CLINIC | Age: 38
Discharge: HOME | End: 2023-11-08
Attending: FAMILY MEDICINE | Admitting: FAMILY MEDICINE
Payer: COMMERCIAL

## 2023-11-08 DIAGNOSIS — R55 SYNCOPE, UNSPECIFIED SYNCOPE TYPE: ICD-10-CM

## 2023-11-08 DIAGNOSIS — R42 DIZZINESS: Primary | ICD-10-CM

## 2023-11-08 LAB — LVEF ECHO: NORMAL

## 2023-11-08 PROCEDURE — 93306 TTE W/DOPPLER COMPLETE: CPT | Mod: 26 | Performed by: INTERNAL MEDICINE

## 2023-11-08 PROCEDURE — 93306 TTE W/DOPPLER COMPLETE: CPT

## 2023-11-08 PROCEDURE — 99606 MTMS BY PHARM EST 15 MIN: CPT | Mod: 93 | Performed by: PHARMACIST

## 2023-11-08 NOTE — PATIENT INSTRUCTIONS
"Recommendations from today's MTM visit:                                                       Divina,    It was a pleasure talking with you! We discussed the followin. You are on several medications that have risk for dizziness. Only two of your medications were started/dose increased close to the time you started noticing symptoms of dizziness - those are topiramate and carvedilol. Your dizziness may be an additive effect - please discuss with your psychiatrist about switching Abilify to the evening at your upcoming appointment. Monitor dizziness and sleep.     Follow-up:  at 10:30 AM    It was great speaking with you today.  I value your experience and would be very thankful for your time in providing feedback in our clinic survey. In the next few days, you may receive an email or text message from [employer] with a link to a survey related to your  clinical pharmacist.\"     To schedule another MTM appointment, please call the clinic directly or you may call the MTM scheduling line at [phone] or toll-free at [phone].     My Clinical Pharmacist's contact information:                                                      Please feel free to contact me with any questions or concerns you have.      Keep up the good work!!    Leana Hollis, PharmD  [phone] in clinic on Tuesday and Wednesday        "

## 2023-11-08 NOTE — PROGRESS NOTES
Medication Therapy Management (MTM) Encounter    ASSESSMENT:                            Medication Adherence/Access: No issues identified    Dizziness: patient is on several medications that have risk for dizziness. Topiramate and carvedilol were started/dose increased close to the time patient started to notice symptoms of dizziness. May be an additive effect - at our last visit patient was to discuss switching Abilify to the evening with psychiatry - she was unable to keep that appointment. She will discuss with psychiatry at her upcoming appointment in December.     PLAN:                            1. You are on several medications that have risk for dizziness. Only two of your medications were started/dose increased close to the time you started noticing symptoms of dizziness - those are topiramate and carvedilol. Your dizziness may be an additive effect - please discuss with your psychiatrist about switching Abilify to the evening at your upcoming appointment. If no improvement - could consider switching fluoxetine to the evening as well. Monitor dizziness and sleep.     Follow-up: Tuesday, December 12th at 10:30 AM    SUBJECTIVE/OBJECTIVE:                          Divina Delgadillo is a 38 year old female called for a follow-up visit from 10/11/23.       Reason for visit: follow up MTM visit.    Tobacco: She reports that she quit smoking about 8 months ago. Her smoking use included vaping device and cigarettes. She has been exposed to tobacco smoke. She has never used smokeless tobacco.  Alcohol: not currently using    Medication Adherence/Access: no issues reported    Dizziness:     Per our last visit: Patient is on several medications that have risk for dizziness. Only two medications that were started/dose increased close to the time patient started noticing symptoms are topiramate (risk for dizziness 4% to 25%) and carvedilol (risk for dizziness 6% to 33%). Dizziness may be an additive effect - will start  with having patient discuss with psychiatry about switching Abilify to the evening. If no improvement - could consider switching fluoxetine to the evening as well. Monitor dizziness and sleep.     Patient works with psychiatry. Sees every 3-4 months. Patient states she has been on clozapine since she was 17 - risk for dizziness 14-27%. Abilify has risk for dizziness 4-10% - patient has been on since at least 2006, fluoxetine has risk for dizziness of 2-11% - patient has been on since at least 2006. Lamotrigine has risk for dizziness of 7-54% - patient has been on since 2018 (Current dose since 2019).    Patient had to reschedule her last appointment with her psychiatrist due to not having a ride to get to the appointment. Her next appointment is in December, she does not remember the date. She is followed by Dr. Onur Ross.     ----------------    I spent 10 minutes with this patient today. A copy of the visit note was provided to the patient's provider(s).    A summary of these recommendations was sent via [employer].    Leana Hollis, PharmD  Medication Therapy Management     Telemedicine Visit Details  Type of service:  Telephone visit  Start Time:  10:00 AM  End Time: 10:10 AM     Medication Therapy Recommendations  No medication therapy recommendations to display

## 2023-11-10 DIAGNOSIS — E11.65 UNCONTROLLED TYPE 2 DIABETES MELLITUS WITH HYPERGLYCEMIA (H): Primary | ICD-10-CM

## 2023-11-19 NOTE — TELEPHONE ENCOUNTER
RECORDS STATUS - ALL OTHER DIAGNOSIS      RECORDS RECEIVED FROM: Epic   DATE RECEIVED:    NOTES STATUS DETAILS   OFFICE NOTE from referring provider Epic 10/02/23: Dr. Sánchez Dias   OFFICE NOTE from medical oncologist Epic 08/13/18: Dr. Meghan Rosenthal   MEDICATION LIST [facility]    LABS     PATHOLOGY REPORTS Report in Epic 08/01/18: [id number]   ANYTHING RELATED TO DIAGNOSIS Epic Most recent 11/07/23

## 2023-11-24 ENCOUNTER — MYC MEDICAL ADVICE (OUTPATIENT)
Dept: FAMILY MEDICINE | Facility: CLINIC | Age: 38
End: 2023-11-24
Payer: COMMERCIAL

## 2023-11-24 DIAGNOSIS — F51.04 PSYCHOPHYSIOLOGICAL INSOMNIA: Primary | ICD-10-CM

## 2023-11-24 NOTE — TELEPHONE ENCOUNTER
Covering for primary/ordering provider  Patient has a complex mental health history and the hydroxyzine and trazodone are prescribed by what appears to be an outside psychiatrist.  I can place a referral to the sleep clinic, but they are likely booked out a ways.  Recommend she contact her mental health clinic

## 2023-11-30 ENCOUNTER — LAB (OUTPATIENT)
Dept: LAB | Facility: CLINIC | Age: 38
End: 2023-11-30
Payer: COMMERCIAL

## 2023-11-30 DIAGNOSIS — Z79.899 HIGH RISK MEDICATION USE: ICD-10-CM

## 2023-11-30 DIAGNOSIS — F25.0 SCHIZOAFFECTIVE DISORDER, BIPOLAR TYPE (H): ICD-10-CM

## 2023-11-30 DIAGNOSIS — E11.42 TYPE 2 DIABETES MELLITUS WITH DIABETIC POLYNEUROPATHY (H): Primary | ICD-10-CM

## 2023-11-30 LAB
BASOPHILS # BLD AUTO: 0.1 10E3/UL (ref 0–0.2)
BASOPHILS NFR BLD AUTO: 1 %
EOSINOPHIL # BLD AUTO: 0.4 10E3/UL (ref 0–0.7)
EOSINOPHIL NFR BLD AUTO: 2 %
ERYTHROCYTE [DISTWIDTH] IN BLOOD BY AUTOMATED COUNT: 14.3 % (ref 10–15)
HCT VFR BLD AUTO: 38.1 % (ref 35–47)
HGB BLD-MCNC: 11.4 G/DL (ref 11.7–15.7)
IMM GRANULOCYTES # BLD: 0.2 10E3/UL
IMM GRANULOCYTES NFR BLD: 1 %
LYMPHOCYTES # BLD AUTO: 5.6 10E3/UL (ref 0.8–5.3)
LYMPHOCYTES NFR BLD AUTO: 28 %
MCH RBC QN AUTO: 29 PG (ref 26.5–33)
MCHC RBC AUTO-ENTMCNC: 29.9 G/DL (ref 31.5–36.5)
MCV RBC AUTO: 97 FL (ref 78–100)
MONOCYTES # BLD AUTO: 1.2 10E3/UL (ref 0–1.3)
MONOCYTES NFR BLD AUTO: 6 %
NEUTROPHILS # BLD AUTO: 12.9 10E3/UL (ref 1.6–8.3)
NEUTROPHILS NFR BLD AUTO: 64 %
PLATELET # BLD AUTO: 392 10E3/UL (ref 150–450)
RBC # BLD AUTO: 3.93 10E6/UL (ref 3.8–5.2)
WBC # BLD AUTO: 20.3 10E3/UL (ref 4–11)

## 2023-11-30 PROCEDURE — 36415 COLL VENOUS BLD VENIPUNCTURE: CPT

## 2023-11-30 PROCEDURE — 85025 COMPLETE CBC W/AUTO DIFF WBC: CPT

## 2023-12-01 DIAGNOSIS — Z79.4 TYPE 2 DIABETES MELLITUS WITH DIABETIC POLYNEUROPATHY, WITH LONG-TERM CURRENT USE OF INSULIN (H): ICD-10-CM

## 2023-12-01 DIAGNOSIS — I10 ESSENTIAL HYPERTENSION: Chronic | ICD-10-CM

## 2023-12-01 DIAGNOSIS — E11.42 TYPE 2 DIABETES MELLITUS WITH DIABETIC POLYNEUROPATHY (H): ICD-10-CM

## 2023-12-01 DIAGNOSIS — E11.42 TYPE 2 DIABETES MELLITUS WITH DIABETIC POLYNEUROPATHY, WITH LONG-TERM CURRENT USE OF INSULIN (H): ICD-10-CM

## 2023-12-01 DIAGNOSIS — R60.0 LOWER EXTREMITY EDEMA: ICD-10-CM

## 2023-12-01 RX ORDER — CARVEDILOL 12.5 MG/1
12.5 TABLET ORAL 2 TIMES DAILY
Qty: 60 TABLET | Refills: 11 | Status: SHIPPED | OUTPATIENT
Start: 2023-12-01

## 2023-12-01 RX ORDER — BUMETANIDE 0.5 MG/1
0.5 TABLET ORAL DAILY
Qty: 30 TABLET | Refills: 2 | Status: SHIPPED | OUTPATIENT
Start: 2023-12-01 | End: 2024-02-26

## 2023-12-01 RX ORDER — PROCHLORPERAZINE 25 MG/1
1 SUPPOSITORY RECTAL
Qty: 12 EACH | Refills: 1 | Status: SHIPPED | OUTPATIENT
Start: 2023-12-01 | End: 2024-02-05

## 2023-12-01 RX ORDER — INSULIN ASPART 100 [IU]/ML
INJECTION, SOLUTION INTRAVENOUS; SUBCUTANEOUS
Qty: 30 ML | Refills: 1 | Status: SHIPPED | OUTPATIENT
Start: 2023-12-01 | End: 2023-12-05

## 2023-12-01 NOTE — TELEPHONE ENCOUNTER
Refills for bumetanide (BUMEX) 0.5 MG tablet and carvedilol (COREG) 12.5 MG tablet sent to patient's pharmacy. Patient's last visit with Dr Dias was 10/2/23. Patient has a scheduled follow up visit for 12/11/23.  HALLIE [place] Care Coordinator  Nephrology

## 2023-12-04 ENCOUNTER — MYC MEDICAL ADVICE (OUTPATIENT)
Dept: FAMILY MEDICINE | Facility: CLINIC | Age: 38
End: 2023-12-04
Payer: COMMERCIAL

## 2023-12-05 ENCOUNTER — VIRTUAL VISIT (OUTPATIENT)
Dept: ENDOCRINOLOGY | Facility: CLINIC | Age: 38
End: 2023-12-05
Payer: COMMERCIAL

## 2023-12-05 ENCOUNTER — MYC MEDICAL ADVICE (OUTPATIENT)
Dept: FAMILY MEDICINE | Facility: CLINIC | Age: 38
End: 2023-12-05

## 2023-12-05 DIAGNOSIS — E11.22 TYPE 2 DIABETES MELLITUS WITH STAGE 3B CHRONIC KIDNEY DISEASE, WITH LONG-TERM CURRENT USE OF INSULIN (H): ICD-10-CM

## 2023-12-05 DIAGNOSIS — N18.32 TYPE 2 DIABETES MELLITUS WITH STAGE 3B CHRONIC KIDNEY DISEASE, WITH LONG-TERM CURRENT USE OF INSULIN (H): ICD-10-CM

## 2023-12-05 DIAGNOSIS — E11.42 TYPE 2 DIABETES MELLITUS WITH DIABETIC POLYNEUROPATHY, WITH LONG-TERM CURRENT USE OF INSULIN (H): ICD-10-CM

## 2023-12-05 DIAGNOSIS — Z79.4 TYPE 2 DIABETES MELLITUS WITH STAGE 3B CHRONIC KIDNEY DISEASE, WITH LONG-TERM CURRENT USE OF INSULIN (H): ICD-10-CM

## 2023-12-05 DIAGNOSIS — Z79.4 TYPE 2 DIABETES MELLITUS WITH DIABETIC POLYNEUROPATHY, WITH LONG-TERM CURRENT USE OF INSULIN (H): ICD-10-CM

## 2023-12-05 PROCEDURE — 99214 OFFICE O/P EST MOD 30 MIN: CPT | Mod: VID | Performed by: INTERNAL MEDICINE

## 2023-12-05 RX ORDER — ACYCLOVIR 400 MG/1
1 TABLET ORAL
Qty: 3 EACH | Refills: 5 | Status: SHIPPED | OUTPATIENT
Start: 2023-12-05 | End: 2024-03-07

## 2023-12-05 RX ORDER — CLOZAPINE 50 MG/1
50 TABLET ORAL DAILY
COMMUNITY
Start: 2023-12-01 | End: 2024-03-07 | Stop reason: DRUGHIGH

## 2023-12-05 RX ORDER — FLURBIPROFEN SODIUM 0.3 MG/ML
SOLUTION/ DROPS OPHTHALMIC
Qty: 400 EACH | Refills: 11 | Status: SHIPPED | OUTPATIENT
Start: 2023-12-05 | End: 2024-03-07

## 2023-12-05 RX ORDER — INSULIN ASPART 100 [IU]/ML
1-14 INJECTION, SOLUTION INTRAVENOUS; SUBCUTANEOUS
Qty: 30 ML | Refills: 4 | Status: SHIPPED | OUTPATIENT
Start: 2023-12-05 | End: 2024-05-07

## 2023-12-05 RX ORDER — ACYCLOVIR 400 MG/1
1 TABLET ORAL ONCE
Qty: 1 EACH | Refills: 0 | Status: SHIPPED | OUTPATIENT
Start: 2023-12-05 | End: 2023-12-05

## 2023-12-05 RX ORDER — INSULIN GLARGINE 100 [IU]/ML
32 INJECTION, SOLUTION SUBCUTANEOUS EVERY MORNING
Qty: 45 ML | Refills: 4 | Status: SHIPPED | OUTPATIENT
Start: 2023-12-05 | End: 2024-03-07

## 2023-12-05 NOTE — PATIENT INSTRUCTIONS
Continue current lantus dose.     Continue novolog with meals - increase dose by 1 unit with lunch.  1-2 times per week, increase novolog dose if post meal blood sugar is consistently over 180 two hours after a meal.

## 2023-12-05 NOTE — PROGRESS NOTES
Divina is a 38 year old who is being evaluated via a billable video visit.      How would you like to obtain your AVS? MyChart  If the video visit is dropped, the invitation should be resent by: Text to cell phone: [phone]  Will anyone else be joining your video visit? No      Endocrine Consult Video visit note    Attending Assessment/Plan :        Type 2 diabetes mellitus with diabetic polyneuropathy, with long-term current use of insulin (H)  Type 2 diabetes mellitus with stage 3b chronic kidney disease, with long-term current use of insulin (H)    Assessment:  -above goal a1c of <7% without hypoglycemia  -benefiting from CGM and using consistently  -would benefit from hybrid closed loop pump which patient is interested in.  Needs to have low enough c-peptide to qualify.  Will be getting recheck of this  -otherwise, sounds like she needs increase in prandial insulin today     Plan:  -continue lantus at current dose  -increase lunchtime insulin by 1 unit.  Instructed patient to increase meal time dosing by 1 unit 1-2 times per week if meals show consistent pattern of 2 hour post-prandial BG of >180.   -continue using dexcom - will send orders for g7 if insurance will cover    I have independently reviewed and interpreted labs, imaging as indicated.     RTC 4-6 months with labs 1-2 weeks before followup    Addendum - Patient has been on MDI at least 3 injections per day for the last 6 months and has been testing/monitoring BG at least 4 times per day for the past 2 months.     Chief complaint:  Divina is a 38 year old female seen for type 2 diabetes    HISTORY OF PRESENT ILLNESS    Divina is a 38F with t2dm referred to endocrinology for management of type 2 diabetes.  She presents for followup today.  We had attempted to get her a 780G/guardian 4 but insurance will not cover this because of c-peptide not being low even though this is not required by medical practice guidelines.      Diabetes diagnosed in about  2011.  Started insulin in 2017.      Complications: nephropathy, neuropathy     Current regimen:  Lantus - 32 units qam   Novolog - 7 units for big meals, medium 4-5, snack or small meal  2-3 units      Today she reports that previous low blood sugars have mostly resolved since dose reduction.     GLP-1 contraindicated given hx of pancreatitis.     Comprehensive diabetes education: completed    Unable to download CGM today but discussed blood sugars with patient.     SMBG as reported by patient:  Morning blood sugars -  if fasted    Daytime BG - 130-200.  Pt reports that numbers would tend to be higher on average after lunch.       Endocrine relevant labs and imaging are as follows:    Component      Latest Ref Rng 3/22/2023  9:09 AM 7/13/2023  2:19 PM   Sodium      136 - 145 mmol/L  144    Potassium      3.4 - 5.3 mmol/L  4.5    Chloride      98 - 107 mmol/L  112 (H)    Carbon Dioxide (CO2)      22 - 29 mmol/L  19 (L)    Anion Gap      7 - 15 mmol/L  13    Urea Nitrogen      6.0 - 20.0 mg/dL  34.5 (H)    Creatinine      0.51 - 0.95 mg/dL  1.70 (H)    Calcium      8.6 - 10.0 mg/dL  9.7    Glucose      70 - 99 mg/dL  79    GFR Estimate      >60 mL/min/1.73m2  39 (L)    Creatinine Urine      mg/dL 164.0     Albumin Urine mg/L      mg/L 16.1     Albumin Urine mg/g Cr      0.00 - 25.00 mg/g Cr 9.82     Hemoglobin A1C      0.0 - 5.6 %  8.3 (H)       Component      Latest Ref Rng 9/13/2023  11:04 AM   Hemoglobin A1C      0.0 - 5.6 % 8.3 (H)       Legend:  (H) High      REVIEW OF SYSTEMS    10 system ROS otherwise as per the HPI or negative    Past Medical History  Past Medical History:   Diagnosis Date    Acquired asplenia     Acute pain of left knee 03/22/2019    Acute-on-chronic kidney injury  03/22/2019    ARF (acute renal failure) (H24) 03/23/2019    Asthma     Bipolar disorder (H)     Cardiac murmur     Cervical high risk HPV (human papillomavirus) test positive 06/20/2017    Chiari malformation type I (H)      Chronic bilateral low back pain without sciatica     Deep venous thrombosis of arm (H) 01/06/2017    ultrasound 1/6/2017-  venous thrombus in the antecubital fossa region and the left forearm as described    Depressive disorder     DVT (deep venous thrombosis) (H)     DVT of upper extremity (deep vein thrombosis) (H) 2021    Esophageal reflux     GERD    Hypertension     Insomnia     Leukocytosis     Mild intermittent asthma     Morbid obesity (H)     Mucinous neoplasm of pancreas     NAFL (nonalcoholic fatty liver)     Neck pain     Nicotine abuse     Other specified types of schizophrenia, unspecified condition     Schizoaffective disorder, depressive type (H)     Stage 3a chronic kidney disease (CKD) (H)     Type 2 diabetes mellitus (H)     Ulnar neuropathy of both upper extremities     Vitamin D insufficiency        Medications  Current Outpatient Medications   Medication Sig Dispense Refill    acetaminophen (TYLENOL) 325 MG tablet Take 325-650 mg by mouth 2 tab every 4-6 hours as needed, do not exceed 9 tabs in 24hours      albuterol (VENTOLIN HFA) 108 (90 Base) MCG/ACT inhaler INHALE 2 PUFFS INTO THE LUNGS EVERY SIX  HOURS AS NEEDED FOR SHORTNESS OF BREATH 18 g 10    ARIPiprazole (ABILIFY) 30 MG tablet Take 30 mg by mouth daily      atorvastatin (LIPITOR) 10 MG tablet Take 1 tablet (10 mg) by mouth daily 90 tablet 2    Biotin 5 MG TABS Take 1 tablet by mouth daily 30 tablet 5    bumetanide (BUMEX) 0.5 MG tablet Take 1 tablet (0.5 mg) by mouth daily 30 tablet 2    carvedilol (COREG) 12.5 MG tablet Take 1 tablet (12.5 mg) by mouth 2 times daily 60 tablet 11    cloZAPine (CLOZARIL) 100 MG tablet Take 50 mg by mouth 2 times daily      cloZAPine (CLOZARIL) 50 MG tablet 50 mg daily      Continuous Blood Gluc  (DEXCOM G7 ) VANE 1 Device once for 1 dose 1 each 0    Continuous Blood Gluc Sensor (DEXCOM G6 SENSOR) MISC 1 each every 10 days. Change every 10 days. USE TO READ BLOOD SUGARS PER   INSTRUCTIONS 12 each 1    Continuous Blood Gluc Sensor (DEXCOM G7 SENSOR) MISC 1 Device every 10 days 3 each 5    FLUoxetine (PROZAC) 40 MG capsule Take 40 mg by mouth daily      glucose (BD GLUCOSE) 4 g chewable tablet Take 1 tablet by mouth every hour as needed for low blood sugar 30 tablet 4    hydrOXYzine (ATARAX) 25 MG tablet Take 1 Tablet (25 mg) by mouth 3 times daily if needed for Anxiety.      Insulin Aspart FlexPen 100 UNIT/ML SOPN Inject 1-14 Units Subcutaneous 3 times daily (before meals) Inject 3 times daily with meals as instructed.  Max TDD 30. 30 mL 4    insulin glargine (LANTUS SOLOSTAR) 100 UNIT/ML pen Inject 32 Units Subcutaneous every morning 45 mL 4    insulin pen needle (ULTICARE MINI) 31G X 6 MM miscellaneous USE 1 PEN NEEDLE 4 times daily 400 each 11    lamoTRIgine (LAMICTAL) 100 MG tablet Take 100 mg by mouth At Bedtime      loperamide (IMODIUM A-D) 2 MG tablet 2 caplets after loose stool then 1 after each loose stool do not exceed 4 in 24hrs      loratadine (CLARITIN) 10 MG tablet Take 1 tablet (10 mg) by mouth every morning 28 tablet 10    omeprazole (PRILOSEC) 20 MG DR capsule TAKE 1 CAPSULE BY MOUTH EVERY DAY 28 capsule 2    QUEtiapine (SEROQUEL) 25 MG tablet Take 25 mg by mouth 1 tablet 4 times daily as needed for agitation or hallucinations      topiramate (TOPAMAX) 50 MG tablet Take 1 tablet (50 mg) by mouth 2 times daily 60 tablet 3    traZODone (DESYREL) 50 MG tablet 50 mg as needed      ACCU-CHEK GUIDE test strip Use to test blood sugar 3 times daily or as directed. 150 strip 1    Biotin 5000 MCG CAPS Take 1 tablet by mouth daily at 2 pm (Patient not taking: Reported on 8/22/2023)      blood glucose (NO BRAND SPECIFIED) test strip Use to test blood sugar 4 times daily or as directed. 100 strip 3    blood glucose (ONETOUCH VERIO IQ) test strip Use to test blood sugar up to 3 times daily 100 strip 11    Blood Glucose Monitoring Suppl (ONETOUCH VERIO FLEX SYSTEM) w/Device KIT 1 each  "daily 1 kit 0    COMPOUNDED NON-CONTROLLED SUBSTANCE (CMPD RX) - PHARMACY TO MIX COMPOUNDED MEDICATION Chloramphenicol 50 mg, sulfamethoxazole 50 mg, amphotericin B 5 mg, hydrocortisone 1 mg. 2-3 puffs to affected ear PRN itching/drainage (Patient not taking: Reported on 7/13/2023) 10 capsule 1    Continuous Blood Gluc Sensor (GUARDIAN 4 GLUCOSE SENSOR) MISC 1 each every 7 days Change sensor every 7 days 15 each 4    Continuous Blood Gluc Transmit (DEXCOM G6 TRANSMITTER) MISC 1 each every 3 months Change every 3 months. USE TO READ BLOOD SUGARS PER  INSTRUCTIONS 1 each 1    fluocinolone acetonide 0.01 % OIL Place 4 drops in ear(s) 2 times daily as needed (ear itching) (Patient not taking: Reported on 7/13/2023) 20 mL 3    Insulin Infusion Pump (MINIMED 780G INSULIN PUMP) KIT 1 each daily SmartGuard Target: 100; Active Insulin Time: 2 hours (recommended); SmartGuardTM Warmup/Manual Mode: Suspend before low (recommended) (Patient not taking: Reported on 8/22/2023) 1 kit 0    Insulin Infusion Pump Supplies (EXTENDED INFUSION SET 23\"/6MM) MISC 1 each every 7 days Max Total Daily Dose 70 units; Change infusion set & reservoir every 7 days (recommended) (Patient not taking: Reported on 10/19/2023) 10 each 6    Insulin Infusion Pump Supplies (EXTENDED RESERVOIR 3ML) MISC 1 each every 7 days (Patient not taking: Reported on 8/22/2023) 10 each 11    insulin pen needle (32G X 6 MM) 32G X 6 MM miscellaneous Use 4 pen needles daily. 150 each 10    OneTouch Delica Lancets 33G MISC 1 each 4 times daily 100 each 11       Allergies  Allergies   Allergen Reactions    Acetaminophen Other (See Comments)     pt previously tried to overdose on it, PMD does not want pt taking per pt.     Latex Rash       Family History  family history includes Allergies in her brother, mother, sister, and sister; Anxiety Disorder in her father and mother; Asthma in her mother; Chronic Obstructive Pulmonary Disease in her mother; Depression in " her father, mother, sister, and sister; Diabetes in her father; Heart Disease in her father; Hypertension in her father; Mental Illness in her father and mother; Obesity in her father; Respiratory in her brother, mother, sister, and sister.    Social History  Social History     Tobacco Use    Smoking status: Former     Types: Vaping Device, Cigarettes     Quit date: 2023     Years since quittin.8     Passive exposure: Yes    Smokeless tobacco: Never    Tobacco comments:     A pack every 3 days   Vaping Use    Vaping Use: Every day    Substances: Nicotine, Flavoring    Devices: Disposable   Substance Use Topics    Alcohol use: No    Drug use: No       Physical Exam  There is no height or weight on file to calculate BMI.  GENERAL: no distress  SKIN: Visible skin clear. No significant rash, abnormal pigmentation or lesions.  EYES: Eyes grossly normal to inspection.  No discharge or erythema, or obvious scleral/conjunctival abnormalities.  NECK: visible goiter is not present; no visible neck masses  RESP: No audible wheeze, cough, or visible cyanosis.  No visible retractions or increased work of breathing.    NEURO: Awake, alert, responds appropriately to questions.  Mentation and speech fluent.  PSYCH:affect normal and appearance well-groomed.    Video-Visit Details    Type of service:  Video Visit    Video Start Time (time video started): 1230    Video End Time (time video stopped): 1250    Originating Location (pt. Location): Home      Distant Location (provider location):  Off-site    Mode of Communication:  Video Conference via [employer]    Physician has received verbal consent for a Video Visit from the patient? Yes    I spent a total of  32 minutes on the day of this established patient visit on chart review, lab review, coordinating care, exam, and counseling of patient

## 2023-12-05 NOTE — LETTER
12/5/2023         RE: Divina Delgadillo  [address] [address] [facility] [place] [address]        Dear Colleague,    Thank you for referring your patient, Divina Delgadillo, to the [facility] ENDOCRINOLOGY. Please see a copy of my visit note below.    Divina is a 38 year old who is being evaluated via a billable video visit.      How would you like to obtain your AVS? MyChart  If the video visit is dropped, the invitation should be resent by: Text to cell phone: [phone]  Will anyone else be joining your video visit? No      Endocrine Consult Video visit note    Attending Assessment/Plan :        Type 2 diabetes mellitus with diabetic polyneuropathy, with long-term current use of insulin (H)  Type 2 diabetes mellitus with stage 3b chronic kidney disease, with long-term current use of insulin (H)    Assessment:  -above goal a1c of <7% without hypoglycemia  -benefiting from CGM and using consistently  -would benefit from hybrid closed loop pump which patient is interested in.  Needs to have low enough c-peptide to qualify.  Will be getting recheck of this  -otherwise, sounds like she needs increase in prandial insulin today     Plan:  -continue lantus at current dose  -increase lunchtime insulin by 1 unit.  Instructed patient to increase meal time dosing by 1 unit 1-2 times per week if meals show consistent pattern of 2 hour post-prandial BG of >180.   -continue using dexcom - will send orders for g7 if insurance will cover    I have independently reviewed and interpreted labs, imaging as indicated.     RTC 4-6 months with labs 1-2 weeks before followup    Chief complaint:  Divina is a 38 year old female seen for type 2 diabetes    HISTORY OF PRESENT ILLNESS    Divina is a 38F with t2dm referred to endocrinology for management of type 2 diabetes.  She presents for followup today.  We had attempted to get her a 780G/guardian 4 but insurance will not cover this because of c-peptide not being low even though  this is not required by medical practice guidelines.      Diabetes diagnosed in about 2011.  Started insulin in 2017.      Complications: nephropathy, neuropathy     Current regimen:  Lantus - 32 units qam   Novolog - 7 units for big meals, medium 4-5, snack or small meal  2-3 units      Today she reports that previous low blood sugars have mostly resolved since dose reduction.     GLP-1 contraindicated given hx of pancreatitis.     Comprehensive diabetes education: completed    Unable to download CGM today but discussed blood sugars with patient.     SMBG as reported by patient:  Morning blood sugars -  if fasted    Daytime BG - 130-200.  Pt reports that numbers would tend to be higher on average after lunch.       Endocrine relevant labs and imaging are as follows:    Component      Latest Ref Rng 3/22/2023  9:09 AM 7/13/2023  2:19 PM   Sodium      136 - 145 mmol/L  144    Potassium      3.4 - 5.3 mmol/L  4.5    Chloride      98 - 107 mmol/L  112 (H)    Carbon Dioxide (CO2)      22 - 29 mmol/L  19 (L)    Anion Gap      7 - 15 mmol/L  13    Urea Nitrogen      6.0 - 20.0 mg/dL  34.5 (H)    Creatinine      0.51 - 0.95 mg/dL  1.70 (H)    Calcium      8.6 - 10.0 mg/dL  9.7    Glucose      70 - 99 mg/dL  79    GFR Estimate      >60 mL/min/1.73m2  39 (L)    Creatinine Urine      mg/dL 164.0     Albumin Urine mg/L      mg/L 16.1     Albumin Urine mg/g Cr      0.00 - 25.00 mg/g Cr 9.82     Hemoglobin A1C      0.0 - 5.6 %  8.3 (H)       Component      Latest Ref Rng 9/13/2023  11:04 AM   Hemoglobin A1C      0.0 - 5.6 % 8.3 (H)       Legend:  (H) High      REVIEW OF SYSTEMS    10 system ROS otherwise as per the HPI or negative    Past Medical History  Past Medical History:   Diagnosis Date     Acquired asplenia      Acute pain of left knee 03/22/2019     Acute-on-chronic kidney injury  03/22/2019     ARF (acute renal failure) (H24) 03/23/2019     Asthma      Bipolar disorder (H)      Cardiac murmur      Cervical high  risk HPV (human papillomavirus) test positive 06/20/2017     Chiari malformation type I (H)      Chronic bilateral low back pain without sciatica      Deep venous thrombosis of arm (H) 01/06/2017    ultrasound 1/6/2017-  venous thrombus in the antecubital fossa region and the left forearm as described     Depressive disorder      DVT (deep venous thrombosis) (H)      DVT of upper extremity (deep vein thrombosis) (H) 2021     Esophageal reflux     GERD     Hypertension      Insomnia      Leukocytosis      Mild intermittent asthma      Morbid obesity (H)      Mucinous neoplasm of pancreas      NAFL (nonalcoholic fatty liver)      Neck pain      Nicotine abuse      Other specified types of schizophrenia, unspecified condition      Schizoaffective disorder, depressive type (H)      Stage 3a chronic kidney disease (CKD) (H)      Type 2 diabetes mellitus (H)      Ulnar neuropathy of both upper extremities      Vitamin D insufficiency        Medications  Current Outpatient Medications   Medication Sig Dispense Refill     acetaminophen (TYLENOL) 325 MG tablet Take 325-650 mg by mouth 2 tab every 4-6 hours as needed, do not exceed 9 tabs in 24hours       albuterol (VENTOLIN HFA) 108 (90 Base) MCG/ACT inhaler INHALE 2 PUFFS INTO THE LUNGS EVERY SIX  HOURS AS NEEDED FOR SHORTNESS OF BREATH 18 g 10     ARIPiprazole (ABILIFY) 30 MG tablet Take 30 mg by mouth daily       atorvastatin (LIPITOR) 10 MG tablet Take 1 tablet (10 mg) by mouth daily 90 tablet 2     Biotin 5 MG TABS Take 1 tablet by mouth daily 30 tablet 5     bumetanide (BUMEX) 0.5 MG tablet Take 1 tablet (0.5 mg) by mouth daily 30 tablet 2     carvedilol (COREG) 12.5 MG tablet Take 1 tablet (12.5 mg) by mouth 2 times daily 60 tablet 11     cloZAPine (CLOZARIL) 100 MG tablet Take 50 mg by mouth 2 times daily       cloZAPine (CLOZARIL) 50 MG tablet 50 mg daily       Continuous Blood Gluc  (DEXCOM G7 ) VANE 1 Device once for 1 dose 1 each 0     Continuous  Blood Gluc Sensor (DEXCOM G6 SENSOR) MISC 1 each every 10 days. Change every 10 days. USE TO READ BLOOD SUGARS PER  INSTRUCTIONS 12 each 1     Continuous Blood Gluc Sensor (DEXCOM G7 SENSOR) MISC 1 Device every 10 days 3 each 5     FLUoxetine (PROZAC) 40 MG capsule Take 40 mg by mouth daily       glucose (BD GLUCOSE) 4 g chewable tablet Take 1 tablet by mouth every hour as needed for low blood sugar 30 tablet 4     hydrOXYzine (ATARAX) 25 MG tablet Take 1 Tablet (25 mg) by mouth 3 times daily if needed for Anxiety.       Insulin Aspart FlexPen 100 UNIT/ML SOPN Inject 1-14 Units Subcutaneous 3 times daily (before meals) Inject 3 times daily with meals as instructed.  Max TDD 30. 30 mL 4     insulin glargine (LANTUS SOLOSTAR) 100 UNIT/ML pen Inject 32 Units Subcutaneous every morning 45 mL 4     insulin pen needle (ULTICARE MINI) 31G X 6 MM miscellaneous USE 1 PEN NEEDLE 4 times daily 400 each 11     lamoTRIgine (LAMICTAL) 100 MG tablet Take 100 mg by mouth At Bedtime       loperamide (IMODIUM A-D) 2 MG tablet 2 caplets after loose stool then 1 after each loose stool do not exceed 4 in 24hrs       loratadine (CLARITIN) 10 MG tablet Take 1 tablet (10 mg) by mouth every morning 28 tablet 10     omeprazole (PRILOSEC) 20 MG DR capsule TAKE 1 CAPSULE BY MOUTH EVERY DAY 28 capsule 2     QUEtiapine (SEROQUEL) 25 MG tablet Take 25 mg by mouth 1 tablet 4 times daily as needed for agitation or hallucinations       topiramate (TOPAMAX) 50 MG tablet Take 1 tablet (50 mg) by mouth 2 times daily 60 tablet 3     traZODone (DESYREL) 50 MG tablet 50 mg as needed       ACCU-CHEK GUIDE test strip Use to test blood sugar 3 times daily or as directed. 150 strip 1     Biotin 5000 MCG CAPS Take 1 tablet by mouth daily at 2 pm (Patient not taking: Reported on 8/22/2023)       blood glucose (NO BRAND SPECIFIED) test strip Use to test blood sugar 4 times daily or as directed. 100 strip 3     blood glucose (ONETOUCH VERIO IQ) test  "strip Use to test blood sugar up to 3 times daily 100 strip 11     Blood Glucose Monitoring Suppl (ONETOUCH VERIO FLEX SYSTEM) w/Device KIT 1 each daily 1 kit 0     COMPOUNDED NON-CONTROLLED SUBSTANCE (CMPD RX) - PHARMACY TO MIX COMPOUNDED MEDICATION Chloramphenicol 50 mg, sulfamethoxazole 50 mg, amphotericin B 5 mg, hydrocortisone 1 mg. 2-3 puffs to affected ear PRN itching/drainage (Patient not taking: Reported on 7/13/2023) 10 capsule 1     Continuous Blood Gluc Sensor (GUARDIAN 4 GLUCOSE SENSOR) MISC 1 each every 7 days Change sensor every 7 days 15 each 4     Continuous Blood Gluc Transmit (DEXCOM G6 TRANSMITTER) MISC 1 each every 3 months Change every 3 months. USE TO READ BLOOD SUGARS PER  INSTRUCTIONS 1 each 1     fluocinolone acetonide 0.01 % OIL Place 4 drops in ear(s) 2 times daily as needed (ear itching) (Patient not taking: Reported on 7/13/2023) 20 mL 3     Insulin Infusion Pump (MINIMED 780G INSULIN PUMP) KIT 1 each daily SmartGuard Target: 100; Active Insulin Time: 2 hours (recommended); SmartGuardTM Warmup/Manual Mode: Suspend before low (recommended) (Patient not taking: Reported on 8/22/2023) 1 kit 0     Insulin Infusion Pump Supplies (EXTENDED INFUSION SET 23\"/6MM) MISC 1 each every 7 days Max Total Daily Dose 70 units; Change infusion set & reservoir every 7 days (recommended) (Patient not taking: Reported on 10/19/2023) 10 each 6     Insulin Infusion Pump Supplies (EXTENDED RESERVOIR 3ML) MISC 1 each every 7 days (Patient not taking: Reported on 8/22/2023) 10 each 11     insulin pen needle (32G X 6 MM) 32G X 6 MM miscellaneous Use 4 pen needles daily. 150 each 10     OneTouch Delica Lancets 33G MISC 1 each 4 times daily 100 each 11       Allergies  Allergies   Allergen Reactions     Acetaminophen Other (See Comments)     pt previously tried to overdose on it, PMD does not want pt taking per pt.      Latex Rash       Family History  family history includes Allergies in her brother, " mother, sister, and sister; Anxiety Disorder in her father and mother; Asthma in her mother; Chronic Obstructive Pulmonary Disease in her mother; Depression in her father, mother, sister, and sister; Diabetes in her father; Heart Disease in her father; Hypertension in her father; Mental Illness in her father and mother; Obesity in her father; Respiratory in her brother, mother, sister, and sister.    Social History  Social History     Tobacco Use     Smoking status: Former     Types: Vaping Device, Cigarettes     Quit date: 2023     Years since quittin.8     Passive exposure: Yes     Smokeless tobacco: Never     Tobacco comments:     A pack every 3 days   Vaping Use     Vaping Use: Every day     Substances: Nicotine, Flavoring     Devices: Disposable   Substance Use Topics     Alcohol use: No     Drug use: No       Physical Exam  There is no height or weight on file to calculate BMI.  GENERAL: no distress  SKIN: Visible skin clear. No significant rash, abnormal pigmentation or lesions.  EYES: Eyes grossly normal to inspection.  No discharge or erythema, or obvious scleral/conjunctival abnormalities.  NECK: visible goiter is not present; no visible neck masses  RESP: No audible wheeze, cough, or visible cyanosis.  No visible retractions or increased work of breathing.    NEURO: Awake, alert, responds appropriately to questions.  Mentation and speech fluent.  PSYCH:affect normal and appearance well-groomed.    Video-Visit Details    Type of service:  Video Visit    Video Start Time (time video started): 1230    Video End Time (time video stopped): 1250    Originating Location (pt. Location): Home      Distant Location (provider location):  Off-site    Mode of Communication:  Video Conference via [facility]    Physician has received verbal consent for a Video Visit from the patient? Yes    I spent a total of  32 minutes on the day of this established patient visit on chart review, lab review, coordinating  care, exam, and counseling of patient      Again, thank you for allowing me to participate in the care of your patient.        Sincerely,        Christophe Beckman MD

## 2023-12-06 ENCOUNTER — TELEPHONE (OUTPATIENT)
Dept: ENDOCRINOLOGY | Facility: CLINIC | Age: 38
End: 2023-12-06
Payer: COMMERCIAL

## 2023-12-06 DIAGNOSIS — N18.30 STAGE 3 CHRONIC KIDNEY DISEASE (H): Primary | Chronic | ICD-10-CM

## 2023-12-06 NOTE — TELEPHONE ENCOUNTER
Spoke with pt, schedule f/up in April (4 month) with MARINA Roberts in [facility] per pt James Mitchell on 12/6/2023 at 11:05 AM

## 2023-12-11 ENCOUNTER — LAB (OUTPATIENT)
Dept: LAB | Facility: CLINIC | Age: 38
End: 2023-12-11
Payer: COMMERCIAL

## 2023-12-11 ENCOUNTER — OFFICE VISIT (OUTPATIENT)
Dept: NEPHROLOGY | Facility: CLINIC | Age: 38
End: 2023-12-11
Payer: COMMERCIAL

## 2023-12-11 ENCOUNTER — OFFICE VISIT (OUTPATIENT)
Dept: CARDIOLOGY | Facility: CLINIC | Age: 38
End: 2023-12-11
Payer: COMMERCIAL

## 2023-12-11 ENCOUNTER — APPOINTMENT (OUTPATIENT)
Dept: ULTRASOUND IMAGING | Facility: CLINIC | Age: 38
End: 2023-12-11
Attending: STUDENT IN AN ORGANIZED HEALTH CARE EDUCATION/TRAINING PROGRAM
Payer: COMMERCIAL

## 2023-12-11 ENCOUNTER — HOSPITAL ENCOUNTER (EMERGENCY)
Facility: CLINIC | Age: 38
Discharge: HOME | End: 2023-12-12
Attending: STUDENT IN AN ORGANIZED HEALTH CARE EDUCATION/TRAINING PROGRAM | Admitting: STUDENT IN AN ORGANIZED HEALTH CARE EDUCATION/TRAINING PROGRAM
Payer: COMMERCIAL

## 2023-12-11 VITALS
TEMPERATURE: 98.4 F | RESPIRATION RATE: 18 BRPM | OXYGEN SATURATION: 96 % | BODY MASS INDEX: 39.14 KG/M2 | HEART RATE: 78 BPM | HEIGHT: 64 IN | SYSTOLIC BLOOD PRESSURE: 146 MMHG | DIASTOLIC BLOOD PRESSURE: 55 MMHG

## 2023-12-11 VITALS
DIASTOLIC BLOOD PRESSURE: 79 MMHG | HEART RATE: 74 BPM | BODY MASS INDEX: 38.93 KG/M2 | HEIGHT: 64 IN | RESPIRATION RATE: 16 BRPM | WEIGHT: 228 LBS | OXYGEN SATURATION: 100 % | SYSTOLIC BLOOD PRESSURE: 131 MMHG

## 2023-12-11 VITALS
OXYGEN SATURATION: 94 % | WEIGHT: 228 LBS | HEART RATE: 74 BPM | SYSTOLIC BLOOD PRESSURE: 143 MMHG | DIASTOLIC BLOOD PRESSURE: 63 MMHG | BODY MASS INDEX: 39.14 KG/M2

## 2023-12-11 DIAGNOSIS — R07.89 OTHER CHEST PAIN: ICD-10-CM

## 2023-12-11 DIAGNOSIS — R55 SYNCOPE, UNSPECIFIED SYNCOPE TYPE: ICD-10-CM

## 2023-12-11 DIAGNOSIS — I10 ESSENTIAL HYPERTENSION: ICD-10-CM

## 2023-12-11 DIAGNOSIS — E11.22 TYPE 2 DIABETES MELLITUS WITH STAGE 3A CHRONIC KIDNEY DISEASE, WITH LONG-TERM CURRENT USE OF INSULIN (H): ICD-10-CM

## 2023-12-11 DIAGNOSIS — Z79.4 TYPE 2 DIABETES MELLITUS WITH STAGE 3A CHRONIC KIDNEY DISEASE, WITH LONG-TERM CURRENT USE OF INSULIN (H): ICD-10-CM

## 2023-12-11 DIAGNOSIS — N18.30 STAGE 3 CHRONIC KIDNEY DISEASE (H): Chronic | ICD-10-CM

## 2023-12-11 DIAGNOSIS — N18.31 TYPE 2 DIABETES MELLITUS WITH STAGE 3A CHRONIC KIDNEY DISEASE, WITH LONG-TERM CURRENT USE OF INSULIN (H): ICD-10-CM

## 2023-12-11 DIAGNOSIS — M79.652 PAIN OF LEFT THIGH: ICD-10-CM

## 2023-12-11 DIAGNOSIS — N18.31 STAGE 3A CHRONIC KIDNEY DISEASE (H): Primary | ICD-10-CM

## 2023-12-11 DIAGNOSIS — E11.65 UNCONTROLLED TYPE 2 DIABETES MELLITUS WITH HYPERGLYCEMIA (H): ICD-10-CM

## 2023-12-11 DIAGNOSIS — I10 ESSENTIAL HYPERTENSION: Primary | Chronic | ICD-10-CM

## 2023-12-11 LAB
ALBUMIN SERPL BCG-MCNC: 3.8 G/DL (ref 3.5–5.2)
ANION GAP SERPL CALCULATED.3IONS-SCNC: 8 MMOL/L (ref 7–15)
BUN SERPL-MCNC: 23.9 MG/DL (ref 6–20)
CALCIUM SERPL-MCNC: 8.9 MG/DL (ref 8.6–10)
CHLORIDE SERPL-SCNC: 108 MMOL/L (ref 98–107)
CREAT SERPL-MCNC: 1.41 MG/DL (ref 0.51–0.95)
DEPRECATED HCO3 PLAS-SCNC: 26 MMOL/L (ref 22–29)
EGFRCR SERPLBLD CKD-EPI 2021: 49 ML/MIN/1.73M2
GLUCOSE SERPL-MCNC: 75 MG/DL (ref 70–99)
HGB BLD-MCNC: 12 G/DL (ref 11.7–15.7)
PHOSPHATE SERPL-MCNC: 2.4 MG/DL (ref 2.5–4.5)
POTASSIUM SERPL-SCNC: 4.2 MMOL/L (ref 3.4–5.3)
PTH-INTACT SERPL-MCNC: 68 PG/ML (ref 15–65)
SODIUM SERPL-SCNC: 142 MMOL/L (ref 135–145)

## 2023-12-11 PROCEDURE — 99283 EMERGENCY DEPT VISIT LOW MDM: CPT | Performed by: STUDENT IN AN ORGANIZED HEALTH CARE EDUCATION/TRAINING PROGRAM

## 2023-12-11 PROCEDURE — 94640 AIRWAY INHALATION TREATMENT: CPT | Performed by: STUDENT IN AN ORGANIZED HEALTH CARE EDUCATION/TRAINING PROGRAM

## 2023-12-11 PROCEDURE — 99214 OFFICE O/P EST MOD 30 MIN: CPT | Performed by: INTERNAL MEDICINE

## 2023-12-11 PROCEDURE — 93971 EXTREMITY STUDY: CPT | Mod: LT

## 2023-12-11 PROCEDURE — 250N000009 HC RX 250: Performed by: STUDENT IN AN ORGANIZED HEALTH CARE EDUCATION/TRAINING PROGRAM

## 2023-12-11 PROCEDURE — 80069 RENAL FUNCTION PANEL: CPT

## 2023-12-11 PROCEDURE — 250N000013 HC RX MED GY IP 250 OP 250 PS 637: Performed by: STUDENT IN AN ORGANIZED HEALTH CARE EDUCATION/TRAINING PROGRAM

## 2023-12-11 PROCEDURE — 99204 OFFICE O/P NEW MOD 45 MIN: CPT | Performed by: INTERNAL MEDICINE

## 2023-12-11 PROCEDURE — 36415 COLL VENOUS BLD VENIPUNCTURE: CPT

## 2023-12-11 PROCEDURE — 83970 ASSAY OF PARATHORMONE: CPT

## 2023-12-11 PROCEDURE — 85018 HEMOGLOBIN: CPT

## 2023-12-11 PROCEDURE — 99284 EMERGENCY DEPT VISIT MOD MDM: CPT | Mod: 25 | Performed by: STUDENT IN AN ORGANIZED HEALTH CARE EDUCATION/TRAINING PROGRAM

## 2023-12-11 RX ORDER — ACETAMINOPHEN 325 MG/1
975 TABLET ORAL ONCE
Status: COMPLETED | OUTPATIENT
Start: 2023-12-11 | End: 2023-12-11

## 2023-12-11 RX ORDER — LISINOPRIL 5 MG/1
5 TABLET ORAL DAILY
COMMUNITY
Start: 2023-12-08 | End: 2023-12-11

## 2023-12-11 RX ORDER — ALBUTEROL SULFATE 0.83 MG/ML
2.5 SOLUTION RESPIRATORY (INHALATION) ONCE
Status: COMPLETED | OUTPATIENT
Start: 2023-12-11 | End: 2023-12-11

## 2023-12-11 RX ADMIN — ALBUTEROL SULFATE 2.5 MG: 2.5 SOLUTION RESPIRATORY (INHALATION) at 23:26

## 2023-12-11 RX ADMIN — ACETAMINOPHEN 975 MG: 325 TABLET, FILM COATED ORAL at 23:00

## 2023-12-11 ASSESSMENT — ACTIVITIES OF DAILY LIVING (ADL): ADLS_ACUITY_SCORE: 35

## 2023-12-11 NOTE — PROGRESS NOTES
HPI:     This is a 38 year old here for syncope back this summer. She has a history of Bipolar, HTN and T2DM, Chiari malformation here for dizziness. She is here with .    Has sleep medicine referral for difficulty sleeping, getting sleep study. Fatigued, sleeps only an hour a night.     Father with heart valve replacement, paternal grandmother with heart valve replacement (father at 35 years old).     Smoking off and on since 2005.     Has occasional CP, no sob, leg swelling. Has daily dizziness. Had echocardiogram which was normal.     ASSESSMENT/PLAN:    1.  CP: risk factors for CAD include HTN, DM, fam history, smoking. Will check nuclear treadmill test     2. Lightheadedness and syncope: evaluate for rhythm abnormalities, heart blocker  -check ziopatch     Follow up in 1-2 months with DEEPTI     Lyn Herman MD [facility]    PAST MEDICAL HISTORY  Past Medical History:   Diagnosis Date    Acquired asplenia     Acute pain of left knee 03/22/2019    Acute-on-chronic kidney injury  03/22/2019    ARF (acute renal failure) (H24) 03/23/2019    Asthma     Bipolar disorder (H)     Cardiac murmur     Cervical high risk HPV (human papillomavirus) test positive 06/20/2017    Chiari malformation type I (H)     Chronic bilateral low back pain without sciatica     Deep venous thrombosis of arm (H) 01/06/2017    ultrasound 1/6/2017-  venous thrombus in the antecubital fossa region and the left forearm as described    Depressive disorder     DVT (deep venous thrombosis) (H)     DVT of upper extremity (deep vein thrombosis) (H) 2021    Esophageal reflux     GERD    Hypertension     Insomnia     Leukocytosis     Mild intermittent asthma     Morbid obesity (H)     Mucinous neoplasm of pancreas     NAFL (nonalcoholic fatty liver)     Neck pain     Nicotine abuse     Other specified types of schizophrenia, unspecified condition     Schizoaffective disorder, depressive type (H)     Stage 3a chronic kidney disease  (CKD) (H)     Type 2 diabetes mellitus (H)     Ulnar neuropathy of both upper extremities     Vitamin D insufficiency        CURRENT MEDICATIONS  Current Outpatient Medications   Medication Sig Dispense Refill    ACCU-CHEK GUIDE test strip Use to test blood sugar 3 times daily or as directed. 150 strip 1    acetaminophen (TYLENOL) 325 MG tablet Take 325-650 mg by mouth 2 tab every 4-6 hours as needed, do not exceed 9 tabs in 24hours      albuterol (VENTOLIN HFA) 108 (90 Base) MCG/ACT inhaler INHALE 2 PUFFS INTO THE LUNGS EVERY SIX  HOURS AS NEEDED FOR SHORTNESS OF BREATH 18 g 10    ARIPiprazole (ABILIFY) 30 MG tablet Take 30 mg by mouth daily      atorvastatin (LIPITOR) 10 MG tablet Take 1 tablet (10 mg) by mouth daily 90 tablet 2    Biotin 5 MG TABS Take 1 tablet by mouth daily 30 tablet 5    Biotin 5000 MCG CAPS Take 1 tablet by mouth daily at 2 pm      blood glucose (NO BRAND SPECIFIED) test strip Use to test blood sugar 4 times daily or as directed. 100 strip 3    blood glucose (ONETOUCH VERIO IQ) test strip Use to test blood sugar up to 3 times daily 100 strip 11    Blood Glucose Monitoring Suppl (ONETOUCH VERIO FLEX SYSTEM) w/Device KIT 1 each daily 1 kit 0    bumetanide (BUMEX) 0.5 MG tablet Take 1 tablet (0.5 mg) by mouth daily 30 tablet 2    carvedilol (COREG) 12.5 MG tablet Take 1 tablet (12.5 mg) by mouth 2 times daily 60 tablet 11    cloZAPine (CLOZARIL) 100 MG tablet Take 50 mg by mouth 2 times daily      cloZAPine (CLOZARIL) 50 MG tablet 50 mg daily      Continuous Blood Gluc Sensor (DEXCOM G6 SENSOR) MISC 1 each every 10 days. Change every 10 days. USE TO READ BLOOD SUGARS PER  INSTRUCTIONS 12 each 1    Continuous Blood Gluc Sensor (DEXCOM G7 SENSOR) MISC 1 Device every 10 days 3 each 5    Continuous Blood Gluc Sensor (GUARDIAN 4 GLUCOSE SENSOR) MISC 1 each every 7 days Change sensor every 7 days 15 each 4    Continuous Blood Gluc Transmit (DEXCOM G6 TRANSMITTER) MISC 1 each every 3 months  Change every 3 months. USE TO READ BLOOD SUGARS PER  INSTRUCTIONS 1 each 1    FLUoxetine (PROZAC) 40 MG capsule Take 40 mg by mouth daily      glucose (BD GLUCOSE) 4 g chewable tablet Take 1 tablet by mouth every hour as needed for low blood sugar 30 tablet 4    hydrOXYzine (ATARAX) 25 MG tablet Take 1 Tablet (25 mg) by mouth 3 times daily if needed for Anxiety.      Insulin Aspart FlexPen 100 UNIT/ML SOPN Inject 1-14 Units Subcutaneous 3 times daily (before meals) Inject 3 times daily with meals as instructed.  Max TDD 30. 30 mL 4    insulin glargine (LANTUS SOLOSTAR) 100 UNIT/ML pen Inject 32 Units Subcutaneous every morning 45 mL 4    insulin pen needle (32G X 6 MM) 32G X 6 MM miscellaneous Use 4 pen needles daily. 150 each 10    insulin pen needle (ULTICARE MINI) 31G X 6 MM miscellaneous USE 1 PEN NEEDLE 4 times daily 400 each 11    lamoTRIgine (LAMICTAL) 100 MG tablet Take 100 mg by mouth At Bedtime      loperamide (IMODIUM A-D) 2 MG tablet 2 caplets after loose stool then 1 after each loose stool do not exceed 4 in 24hrs      loratadine (CLARITIN) 10 MG tablet Take 1 tablet (10 mg) by mouth every morning 28 tablet 10    omeprazole (PRILOSEC) 20 MG DR capsule TAKE 1 CAPSULE BY MOUTH EVERY DAY 28 capsule 2    OneTouch Delica Lancets 33G MISC 1 each 4 times daily 100 each 11    QUEtiapine (SEROQUEL) 25 MG tablet Take 25 mg by mouth 1 tablet 4 times daily as needed for agitation or hallucinations      topiramate (TOPAMAX) 50 MG tablet Take 1 tablet (50 mg) by mouth 2 times daily 60 tablet 3    traZODone (DESYREL) 50 MG tablet 50 mg as needed      COMPOUNDED NON-CONTROLLED SUBSTANCE (CMPD RX) - PHARMACY TO MIX COMPOUNDED MEDICATION Chloramphenicol 50 mg, sulfamethoxazole 50 mg, amphotericin B 5 mg, hydrocortisone 1 mg. 2-3 puffs to affected ear PRN itching/drainage (Patient not taking: Reported on 7/13/2023) 10 capsule 1    fluocinolone acetonide 0.01 % OIL Place 4 drops in ear(s) 2 times daily as  "needed (ear itching) (Patient not taking: Reported on 7/13/2023) 20 mL 3    Insulin Infusion Pump (MINIMED 780G INSULIN PUMP) KIT 1 each daily SmartGuard Target: 100; Active Insulin Time: 2 hours (recommended); SmartGuardTM Warmup/Manual Mode: Suspend before low (recommended) (Patient not taking: Reported on 8/22/2023) 1 kit 0    Insulin Infusion Pump Supplies (EXTENDED INFUSION SET 23\"/6MM) MISC 1 each every 7 days Max Total Daily Dose 70 units; Change infusion set & reservoir every 7 days (recommended) (Patient not taking: Reported on 10/19/2023) 10 each 6    Insulin Infusion Pump Supplies (EXTENDED RESERVOIR 3ML) MISC 1 each every 7 days (Patient not taking: Reported on 8/22/2023) 10 each 11       PAST SURGICAL HISTORY:  Past Surgical History:   Procedure Laterality Date    ADRENALECTOMY Left 2015    BIOPSY      BREAST SURGERY  2013    COLONOSCOPY      ENT SURGERY  10/01/2000    Tympanoplasty    IR LIVER BIOPSY PERCUTANEOUS  11/14/2019    PANCREATECTOMY PARTIAL  2015    PERCUTANEOUS BIOPSY LIVER Right 11/14/2019    Procedure: Percutaneous Liver Biopsy;  Surgeon: Sean Guzman PA-C;  Location: [facility] OR    SPLENECTOMY  2015    TONSILLECTOMY  10/01/2000    Tonsillectomy       ALLERGIES     Allergies   Allergen Reactions    Acetaminophen Other (See Comments)     pt previously tried to overdose on it, PMD does not want pt taking per pt.     Latex Rash       FAMILY HISTORY  Family History   Problem Relation Age of Onset    Respiratory Mother         ASTHMA    Allergies Mother     Depression Mother     Chronic Obstructive Pulmonary Disease Mother     Anxiety Disorder Mother     Mental Illness Mother     Asthma Mother     Heart Disease Father         HEART VALVE REPLACEMENT    Hypertension Father     Diabetes Father     Depression Father     Anxiety Disorder Father     Mental Illness Father     Obesity Father     Respiratory Sister     Allergies Sister     Depression Sister     Respiratory Sister     Allergies " Sister     Depression Sister     Respiratory Brother     Allergies Brother     Kidney Disease No family hx of        SOCIAL HISTORY  Social History     Socioeconomic History    Marital status: Single     Spouse name: Not on file    Number of children: 0    Years of education: Not on file    Highest education level: Not on file   Occupational History    Not on file   Tobacco Use    Smoking status: Every Day     Packs/day: .5     Types: Vaping Device, Cigarettes     Start date: 11/27/2023     Passive exposure: Yes    Smokeless tobacco: Never    Tobacco comments:     A pack every 3 days, stopped for a while 02/12/2023   Vaping Use    Vaping Use: Every day    Substances: Nicotine, Flavoring    Devices: Disposable   Substance and Sexual Activity    Alcohol use: No    Drug use: No    Sexual activity: Yes     Partners: Female     Birth control/protection: I.U.D.     Comment: Both male and female    Other Topics Concern    Parent/sibling w/ CABG, MI or angioplasty before 65F 55M? No   Social History Narrative    Not on file     Social Determinants of Health     Financial Resource Strain: High Risk (10/19/2023)    Financial Resource Strain     Within the past 12 months, have you or your family members you live with been unable to get utilities (heat, electricity) when it was really needed?: Yes   Food Insecurity: Low Risk  (10/19/2023)    Food Insecurity     Within the past 12 months, did you worry that your food would run out before you got money to buy more?: No     Within the past 12 months, did the food you bought just not last and you didn t have money to get more?: No   Transportation Needs: Low Risk  (10/19/2023)    Transportation Needs     Within the past 12 months, has lack of transportation kept you from medical appointments, getting your medicines, non-medical meetings or appointments, work, or from getting things that you need?: No   Physical Activity: Not on file   Stress: Not on file   Social Connections: Not  "on file   Interpersonal Safety: Low Risk  (10/19/2023)    Interpersonal Safety     Do you feel physically and emotionally safe where you currently live?: Yes     Within the past 12 months, have you been hit, slapped, kicked or otherwise physically hurt by someone?: No     Within the past 12 months, have you been humiliated or emotionally abused in other ways by your partner or ex-partner?: No   Housing Stability: Low Risk  (10/19/2023)    Housing Stability     Do you have housing? : Yes     Are you worried about losing your housing?: No       ROS:   Constitutional: No fever, chills, or sweats. No weight gain/loss   ENT: No visual disturbance, ear ache, epistaxis, sore throat  Allergies/Immunologic: Negative  Respiratory: No cough, hemoptysia  Cardiovascular: As per HPI  GI: No nausea, vomiting, hematemesis, melena, or hematochezia  : No urinary frequency, dysuria, or hematuria  Integument: Negative  Psychiatric: Negative  Neuro: Negative  Endocrinology: Negative   Musculoskeletal: Negative  Vascular: No walking impairment, claudication, ischemic rest pain or nonhealing wounds    EXAM:  /79 (BP Location: Right arm, Patient Position: Sitting, Cuff Size: Adult Regular)   Pulse 74   Resp 16   Ht 1.626 m (5' 4\")   Wt 103.4 kg (228 lb)   SpO2 100%   BMI 39.14 kg/m    In general, the patient is a pleasant female in no apparent distress.    HEENT: NC/AT.  PERRLA.  EOMI.  Sclerae white, not injected.   Neck: No adenopathy.  No thyromegaly. Carotids +2/2 bilaterally without bruits.  No jugular venous distension.   Heart: RRR. Normal S1, S2 splits physiologically. No murmur, rub, click, or gallop.   Lungs: CTA.  No ronchi, wheezes, rales.  No dullness to percussion.   Abdomen: Soft, nontender, nondistended. No organomegaly. No AAA.  No bruits.   Extremities: No clubbing, cyanosis, or edema.  No wounds. No varicose veins signs of chronic venous insufficiency.   Vascular: No bruits are noted.    Labs:  LIPID " RESULTS:  Lab Results   Component Value Date    CHOL 175 12/21/2022    CHOL 147 03/26/2021    HDL 72 12/21/2022    HDL 57 03/26/2021    LDL 81 12/21/2022    LDL 72 03/26/2021    TRIG 108 12/21/2022    TRIG 90 03/26/2021    CHOLHDLRATIO 2.0 03/12/2009    NHDL 103 12/21/2022    NHDL 90 03/26/2021       LIVER ENZYME RESULTS:  Lab Results   Component Value Date    AST 33 08/02/2023    AST 38 07/08/2021    ALT 68 (H) 08/02/2023    ALT 65 (H) 07/08/2021       CBC RESULTS:  Lab Results   Component Value Date    WBC 20.3 (H) 11/30/2023    WBC 14.8 (H) 07/08/2021    RBC 3.93 11/30/2023    RBC 4.21 07/08/2021    HGB 12.0 12/11/2023    HGB 12.9 07/08/2021    HCT 38.1 11/30/2023    HCT 39.8 07/08/2021    MCV 97 11/30/2023    MCV 95 07/08/2021    MCH 29.0 11/30/2023    MCH 30.6 07/08/2021    MCHC 29.9 (L) 11/30/2023    MCHC 32.4 07/08/2021    RDW 14.3 11/30/2023    RDW 14.3 07/08/2021     11/30/2023     07/08/2021       BMP RESULTS:  Lab Results   Component Value Date     12/11/2023     07/08/2021    POTASSIUM 4.2 12/11/2023    POTASSIUM 5.1 12/19/2022    POTASSIUM 4.4 07/08/2021    CHLORIDE 108 (H) 12/11/2023    CHLORIDE 112 (H) 12/19/2022    CHLORIDE 108 07/08/2021    CO2 26 12/11/2023    CO2 26 12/19/2022    CO2 26 07/08/2021    ANIONGAP 8 12/11/2023    ANIONGAP 3 12/19/2022    ANIONGAP 5 07/08/2021    GLC 75 12/11/2023     (H) 09/18/2023     (H) 12/19/2022     (H) 07/08/2021    BUN 23.9 (H) 12/11/2023    BUN 19 12/19/2022    BUN 22 07/08/2021    CR 1.41 (H) 12/11/2023    CR 1.32 (H) 07/08/2021    GFRESTIMATED 49 (L) 12/11/2023    GFRESTIMATED 52 (L) 07/08/2021    GFRESTBLACK 60 (L) 07/08/2021    CLAY 8.9 12/11/2023    CLAY 9.2 07/08/2021        A1C RESULTS:  Lab Results   Component Value Date    A1C 8.3 (H) 09/13/2023    A1C 7.4 (H) 07/08/2021

## 2023-12-11 NOTE — PROGRESS NOTES
HPI:             PAST MEDICAL HISTORY  Past Medical History:   Diagnosis Date    Acquired asplenia     Acute pain of left knee 03/22/2019    Acute-on-chronic kidney injury  03/22/2019    ARF (acute renal failure) (H24) 03/23/2019    Asthma     Bipolar disorder (H)     Cardiac murmur     Cervical high risk HPV (human papillomavirus) test positive 06/20/2017    Chiari malformation type I (H)     Chronic bilateral low back pain without sciatica     Deep venous thrombosis of arm (H) 01/06/2017    ultrasound 1/6/2017-  venous thrombus in the antecubital fossa region and the left forearm as described    Depressive disorder     DVT (deep venous thrombosis) (H)     DVT of upper extremity (deep vein thrombosis) (H) 2021    Esophageal reflux     GERD    Hypertension     Insomnia     Leukocytosis     Mild intermittent asthma     Morbid obesity (H)     Mucinous neoplasm of pancreas     NAFL (nonalcoholic fatty liver)     Neck pain     Nicotine abuse     Other specified types of schizophrenia, unspecified condition     Schizoaffective disorder, depressive type (H)     Stage 3a chronic kidney disease (CKD) (H)     Type 2 diabetes mellitus (H)     Ulnar neuropathy of both upper extremities     Vitamin D insufficiency        CURRENT MEDICATIONS  Current Outpatient Medications   Medication Sig Dispense Refill    ACCU-CHEK GUIDE test strip Use to test blood sugar 3 times daily or as directed. 150 strip 1    acetaminophen (TYLENOL) 325 MG tablet Take 325-650 mg by mouth 2 tab every 4-6 hours as needed, do not exceed 9 tabs in 24hours      albuterol (VENTOLIN HFA) 108 (90 Base) MCG/ACT inhaler INHALE 2 PUFFS INTO THE LUNGS EVERY SIX  HOURS AS NEEDED FOR SHORTNESS OF BREATH 18 g 10    ARIPiprazole (ABILIFY) 30 MG tablet Take 30 mg by mouth daily      atorvastatin (LIPITOR) 10 MG tablet Take 1 tablet (10 mg) by mouth daily 90 tablet 2    Biotin 5 MG TABS Take 1 tablet by mouth daily 30 tablet 5    Biotin 5000 MCG CAPS Take 1 tablet by  mouth daily at 2 pm      blood glucose (NO BRAND SPECIFIED) test strip Use to test blood sugar 4 times daily or as directed. 100 strip 3    blood glucose (ONETOUCH VERIO IQ) test strip Use to test blood sugar up to 3 times daily 100 strip 11    Blood Glucose Monitoring Suppl (ONETOUCH VERIO FLEX SYSTEM) w/Device KIT 1 each daily 1 kit 0    bumetanide (BUMEX) 0.5 MG tablet Take 1 tablet (0.5 mg) by mouth daily 30 tablet 2    carvedilol (COREG) 12.5 MG tablet Take 1 tablet (12.5 mg) by mouth 2 times daily 60 tablet 11    cloZAPine (CLOZARIL) 100 MG tablet Take 50 mg by mouth 2 times daily      cloZAPine (CLOZARIL) 50 MG tablet 50 mg daily      Continuous Blood Gluc Sensor (DEXCOM G6 SENSOR) MISC 1 each every 10 days. Change every 10 days. USE TO READ BLOOD SUGARS PER  INSTRUCTIONS 12 each 1    Continuous Blood Gluc Sensor (DEXCOM G7 SENSOR) MISC 1 Device every 10 days 3 each 5    Continuous Blood Gluc Sensor (GUARDIAN 4 GLUCOSE SENSOR) MISC 1 each every 7 days Change sensor every 7 days 15 each 4    Continuous Blood Gluc Transmit (DEXCOM G6 TRANSMITTER) MISC 1 each every 3 months Change every 3 months. USE TO READ BLOOD SUGARS PER  INSTRUCTIONS 1 each 1    FLUoxetine (PROZAC) 40 MG capsule Take 40 mg by mouth daily      glucose (BD GLUCOSE) 4 g chewable tablet Take 1 tablet by mouth every hour as needed for low blood sugar 30 tablet 4    hydrOXYzine (ATARAX) 25 MG tablet Take 1 Tablet (25 mg) by mouth 3 times daily if needed for Anxiety.      Insulin Aspart FlexPen 100 UNIT/ML SOPN Inject 1-14 Units Subcutaneous 3 times daily (before meals) Inject 3 times daily with meals as instructed.  Max TDD 30. 30 mL 4    insulin glargine (LANTUS SOLOSTAR) 100 UNIT/ML pen Inject 32 Units Subcutaneous every morning 45 mL 4    insulin pen needle (32G X 6 MM) 32G X 6 MM miscellaneous Use 4 pen needles daily. 150 each 10    insulin pen needle (ULTICARE MINI) 31G X 6 MM miscellaneous USE 1 PEN NEEDLE 4 times  "daily 400 each 11    lamoTRIgine (LAMICTAL) 100 MG tablet Take 100 mg by mouth At Bedtime      loperamide (IMODIUM A-D) 2 MG tablet 2 caplets after loose stool then 1 after each loose stool do not exceed 4 in 24hrs      loratadine (CLARITIN) 10 MG tablet Take 1 tablet (10 mg) by mouth every morning 28 tablet 10    omeprazole (PRILOSEC) 20 MG DR capsule TAKE 1 CAPSULE BY MOUTH EVERY DAY 28 capsule 2    OneTouch Delica Lancets 33G MISC 1 each 4 times daily 100 each 11    QUEtiapine (SEROQUEL) 25 MG tablet Take 25 mg by mouth 1 tablet 4 times daily as needed for agitation or hallucinations      topiramate (TOPAMAX) 50 MG tablet Take 1 tablet (50 mg) by mouth 2 times daily 60 tablet 3    traZODone (DESYREL) 50 MG tablet 50 mg as needed      COMPOUNDED NON-CONTROLLED SUBSTANCE (CMPD RX) - PHARMACY TO MIX COMPOUNDED MEDICATION Chloramphenicol 50 mg, sulfamethoxazole 50 mg, amphotericin B 5 mg, hydrocortisone 1 mg. 2-3 puffs to affected ear PRN itching/drainage (Patient not taking: Reported on 7/13/2023) 10 capsule 1    fluocinolone acetonide 0.01 % OIL Place 4 drops in ear(s) 2 times daily as needed (ear itching) (Patient not taking: Reported on 7/13/2023) 20 mL 3    Insulin Infusion Pump (MINIMED 780G INSULIN PUMP) KIT 1 each daily SmartGuard Target: 100; Active Insulin Time: 2 hours (recommended); SmartGuardTM Warmup/Manual Mode: Suspend before low (recommended) (Patient not taking: Reported on 8/22/2023) 1 kit 0    Insulin Infusion Pump Supplies (EXTENDED INFUSION SET 23\"/6MM) MISC 1 each every 7 days Max Total Daily Dose 70 units; Change infusion set & reservoir every 7 days (recommended) (Patient not taking: Reported on 10/19/2023) 10 each 6    Insulin Infusion Pump Supplies (EXTENDED RESERVOIR 3ML) MISC 1 each every 7 days (Patient not taking: Reported on 8/22/2023) 10 each 11       PAST SURGICAL HISTORY:  Past Surgical History:   Procedure Laterality Date    ADRENALECTOMY Left 2015    BIOPSY      BREAST SURGERY  " "2013    COLONOSCOPY      ENT SURGERY  10/01/2000    Tympanoplasty    IR LIVER BIOPSY PERCUTANEOUS  11/14/2019    PANCREATECTOMY PARTIAL  2015    PERCUTANEOUS BIOPSY LIVER Right 11/14/2019    Procedure: Percutaneous Liver Biopsy;  Surgeon: Sean Guzman PA-C;  Location: [facility] OR    SPLENECTOMY  2015    TONSILLECTOMY  10/01/2000    Tonsillectomy       ALLERGIES     Allergies   Allergen Reactions    Acetaminophen Other (See Comments)     pt previously tried to overdose on it, PMD does not want pt taking per pt.     Latex Rash       FAMILY HISTORY  Family History   Problem Relation Age of Onset    Respiratory Mother         ASTHMA    Allergies Mother     Depression Mother     Chronic Obstructive Pulmonary Disease Mother     Anxiety Disorder Mother     Mental Illness Mother     Asthma Mother     Heart Disease Father         HEART VALVE REPLACEMENT    Hypertension Father     Diabetes Father     Depression Father     Anxiety Disorder Father     Mental Illness Father     Obesity Father     Respiratory Sister     Allergies Sister     Depression Sister     Respiratory Sister     Allergies Sister     Depression Sister     Respiratory Brother     Allergies Brother     Kidney Disease No family hx of        VASCULAR FAMILY HISTORY  1st order relative with atherosclerotic PAD: {YES / NO:729303::\"Yes\"}  1st order relative with AAA: {YES / NO:854693::\"Yes\"}    SOCIAL HISTORY  Social History     Socioeconomic History    Marital status: Single     Spouse name: Not on file    Number of children: 0    Years of education: Not on file    Highest education level: Not on file   Occupational History    Not on file   Tobacco Use    Smoking status: Every Day     Packs/day: .5     Types: Vaping Device, Cigarettes     Start date: 11/27/2023     Passive exposure: Yes    Smokeless tobacco: Never    Tobacco comments:     A pack every 3 days, stopped for a while 02/12/2023   Vaping Use    Vaping Use: Every day    Substances: Nicotine, " Flavoring    Devices: Disposable   Substance and Sexual Activity    Alcohol use: No    Drug use: No    Sexual activity: Yes     Partners: Female     Birth control/protection: I.U.D.     Comment: Both male and female    Other Topics Concern    Parent/sibling w/ CABG, MI or angioplasty before 65F 55M? No   Social History Narrative    Not on file     Social Determinants of Health     Financial Resource Strain: High Risk (10/19/2023)    Financial Resource Strain     Within the past 12 months, have you or your family members you live with been unable to get utilities (heat, electricity) when it was really needed?: Yes   Food Insecurity: Low Risk  (10/19/2023)    Food Insecurity     Within the past 12 months, did you worry that your food would run out before you got money to buy more?: No     Within the past 12 months, did the food you bought just not last and you didn t have money to get more?: No   Transportation Needs: Low Risk  (10/19/2023)    Transportation Needs     Within the past 12 months, has lack of transportation kept you from medical appointments, getting your medicines, non-medical meetings or appointments, work, or from getting things that you need?: No   Physical Activity: Not on file   Stress: Not on file   Social Connections: Not on file   Interpersonal Safety: Low Risk  (10/19/2023)    Interpersonal Safety     Do you feel physically and emotionally safe where you currently live?: Yes     Within the past 12 months, have you been hit, slapped, kicked or otherwise physically hurt by someone?: No     Within the past 12 months, have you been humiliated or emotionally abused in other ways by your partner or ex-partner?: No   Housing Stability: Low Risk  (10/19/2023)    Housing Stability     Do you have housing? : Yes     Are you worried about losing your housing?: No       ROS:   Constitutional: No fever, chills, or sweats. No weight gain/loss   ENT: No visual disturbance, ear ache, epistaxis, sore  "throat  Allergies/Immunologic: Negative  Respiratory: No cough, hemoptysia  Cardiovascular: As per HPI  GI: No nausea, vomiting, hematemesis, melena, or hematochezia  : No urinary frequency, dysuria, or hematuria  Integument: Negative  Psychiatric: Negative  Neuro: Negative  Endocrinology: Negative   Musculoskeletal: Negative  Vascular: No walking impairment, claudication, ischemic rest pain or nonhealing wounds    EXAM:  /79 (BP Location: Right arm, Patient Position: Sitting, Cuff Size: Adult Regular)   Pulse 74   Resp 16   Ht 1.626 m (5' 4\")   Wt 103.4 kg (228 lb)   SpO2 100%   BMI 39.14 kg/m    In general, the patient is a pleasant female in no apparent distress.    HEENT: NC/AT.  PERRLA.  EOMI.  Sclerae white, not injected.  Nares clear.  Pharynx without erythema or exudate.  Dentition intact.    Neck: No adenopathy.  No thyromegaly. Carotids +2/2 bilaterally without bruits.  No jugular venous distension.   Heart: RRR. Normal S1, S2 splits physiologically. No murmur, rub, click, or gallop. The PMI is in the 5th ICS in the midclavicular line. There is no heave.    Lungs: CTA.  No ronchi, wheezes, rales.  No dullness to percussion.   Abdomen: Soft, nontender, nondistended. No organomegaly. No AAA.  No bruits.   Extremities: No clubbing, cyanosis, or edema.  No wounds. No varicose veins signs of chronic venous insufficiency.   Vascular: No bruits are noted.   Brachial Radial Ulnar Femoral Popliteal DP PT   Left ***/2 ***/2 ***/2 ***/2 ***/2 ***/2 ***/2   Right ***/2 ***/2 ***/2 ***/2 ***/2 ***/2 ***/2     Labs:  LIPID RESULTS:  Lab Results   Component Value Date    CHOL 175 12/21/2022    CHOL 147 03/26/2021    HDL 72 12/21/2022    HDL 57 03/26/2021    LDL 81 12/21/2022    LDL 72 03/26/2021    TRIG 108 12/21/2022    TRIG 90 03/26/2021    CHOLHDLRATIO 2.0 03/12/2009    NHDL 103 12/21/2022    NHDL 90 03/26/2021       LIVER ENZYME RESULTS:  Lab Results   Component Value Date    AST 33 08/02/2023    AST 38 " 07/08/2021    ALT 68 (H) 08/02/2023    ALT 65 (H) 07/08/2021       CBC RESULTS:  Lab Results   Component Value Date    WBC 20.3 (H) 11/30/2023    WBC 14.8 (H) 07/08/2021    RBC 3.93 11/30/2023    RBC 4.21 07/08/2021    HGB 12.0 12/11/2023    HGB 12.9 07/08/2021    HCT 38.1 11/30/2023    HCT 39.8 07/08/2021    MCV 97 11/30/2023    MCV 95 07/08/2021    MCH 29.0 11/30/2023    MCH 30.6 07/08/2021    MCHC 29.9 (L) 11/30/2023    MCHC 32.4 07/08/2021    RDW 14.3 11/30/2023    RDW 14.3 07/08/2021     11/30/2023     07/08/2021       BMP RESULTS:  Lab Results   Component Value Date     12/11/2023     07/08/2021    POTASSIUM 4.2 12/11/2023    POTASSIUM 5.1 12/19/2022    POTASSIUM 4.4 07/08/2021    CHLORIDE 108 (H) 12/11/2023    CHLORIDE 112 (H) 12/19/2022    CHLORIDE 108 07/08/2021    CO2 26 12/11/2023    CO2 26 12/19/2022    CO2 26 07/08/2021    ANIONGAP 8 12/11/2023    ANIONGAP 3 12/19/2022    ANIONGAP 5 07/08/2021    GLC 75 12/11/2023     (H) 09/18/2023     (H) 12/19/2022     (H) 07/08/2021    BUN 23.9 (H) 12/11/2023    BUN 19 12/19/2022    BUN 22 07/08/2021    CR 1.41 (H) 12/11/2023    CR 1.32 (H) 07/08/2021    GFRESTIMATED 49 (L) 12/11/2023    GFRESTIMATED 52 (L) 07/08/2021    GFRESTBLACK 60 (L) 07/08/2021    CLAY 8.9 12/11/2023    CLAY 9.2 07/08/2021        A1C RESULTS:  Lab Results   Component Value Date    A1C 8.3 (H) 09/13/2023    A1C 7.4 (H) 07/08/2021       Procedures:      Assessment and Plan: ***

## 2023-12-11 NOTE — PATIENT INSTRUCTIONS
Monitor blood pressure at home - please update if above 130/80 on home readings  Minimize sodium intake - goal is 2000 mg per day  I will update you on your labs when they return.   Follow-up in 6 months with labs prior.

## 2023-12-11 NOTE — PROGRESS NOTES
12/11/23    CC: CKD Stage 3, HTN    HPI: Divina Delgadillo is a 38 year old female who presents for follow-up of CKD. To review, her hx is significant for diabetes (~ 20 years), bipolar disease - was on lithium therapy for around 6 years but since our initial visit, she has since been taken off of it. Her diabetes was previously very poorly controlled  9% in Nov 2020, now 7.8% in October 2021. Creatinine has been 1.20-1.35 in the past year; stable at 1.35  today. Her last UPCR was 0.14 g/g in December of 2020 - she is taking 5 mg of lisinopril currently.     2019 visit: Divina presents for routine follow-up today.  Her creatinine has been 1.23-3.47 in the past year.  Her baseline seems to be someplace between 1.2 and 1.6 and she is stable at 1.41 at this time.  At her last visit she was very motivated to exercise and have been attending the gym regularly.  Unfortunately she has gotten away from the exercise to some degree but is still motivated to continue to make healthy lifestyle changes.  Her blood pressure is well controlled and she has no swelling concerns.  She is not using NSAIDs.    12/15/20: video visit. Creatinine is stable at this time. 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 Some back pain in the AM -she attributes this to her bed. BP has been good at recent visit. No lightheadedness unless blood sugar is low. No NSAIDs. Eating and drinking well. She had her teeth pulled at the end of May. She is getting new teeth in January so she is excited about that.     12/20/21: Divina is presenting for routine follow-up today. Her creatinine has been 1.20-1.35 over the last year. Her baseline seems to be 1.20-1.40, stable at 1.35 today. At her last visit (virtual) she was doing well with her diabetes (diet and exercise). Still enjoys working at the [employer]. No new concerns today, overall doing well. Was able to quit smoking for two months, but did start again. Plans  to stop in the future, encouraged her to continue with the cessation.    12/19/22: in person visit. Creatinine has been 1.06-1.37; stable at 1.24 at this time. No proteinuria on her check from Dec 2021. She quit smoking this past July - congratulated her on this success. BP high today but otherwise has been 120-69. She denies difficulty with UTIs. On ROS she reports headache episodes that last for a few hours - this has been going on for a couple of months - no vision changes but she notices being in the dark helps them. She has follow-up with primary coming up to discuss this further. No difficulty with yeast infections. BP high today but was 120-69 on Dec 5th visit.     08/22/23: video visit. She had a CT scan of the brain and found a malformation. Causing migraine headaches and dizzy spells. She had a fall in the kidney in July. She is eating/drinking ok. Some diarrhea - she has anywhere from 0-multiple. With the loose stools, could be dehydrated. She got a new job not too long ago - she was drinking a lot of water with work. She is not working now. She is still drinking a lot of water. BP recently in clinic was 110/64. She reports some lightheadedness at times now. She was going to get anesthesia for the MRI but it was canceled because of kidney function - this was at [place]. It is rescheduled for Sept 18th. No NSAIDs. Topamax was added for migraine prevention. Nothing OTC. No nasal treatment.     10/2/23: in person visit. GFR is improved to 49 now from 37 in August. On ROS she reports dizziness. No home BP readings. She has swelling that is worse through the day. BP is high today but 131 recently.     12/11/23: in person visit. Sleep study planned in march. No NSAID use. She is not on lisinopril. She has a HHN that comes out every few weeks - reports that pressures have been greater than 130 when checked at home. She is not restricting her sodium intake at home. Her main complaint today is sleep  difficulties. She has discussed with her PCP - has follow-up planned with psychiatry tomorrow.     ACCU-CHEK GUIDE test strip, Use to test blood sugar 3 times daily or as directed.  acetaminophen (TYLENOL) 325 MG tablet, Take 325-650 mg by mouth 2 tab every 4-6 hours as needed, do not exceed 9 tabs in 24hours  albuterol (VENTOLIN HFA) 108 (90 Base) MCG/ACT inhaler, INHALE 2 PUFFS INTO THE LUNGS EVERY SIX  HOURS AS NEEDED FOR SHORTNESS OF BREATH  ARIPiprazole (ABILIFY) 30 MG tablet, Take 30 mg by mouth daily  atorvastatin (LIPITOR) 10 MG tablet, Take 1 tablet (10 mg) by mouth daily  Biotin 5 MG TABS, Take 1 tablet by mouth daily  Biotin 5000 MCG CAPS, Take 1 tablet by mouth daily at 2 pm  blood glucose (NO BRAND SPECIFIED) test strip, Use to test blood sugar 4 times daily or as directed.  blood glucose (ONETOUCH VERIO IQ) test strip, Use to test blood sugar up to 3 times daily  Blood Glucose Monitoring Suppl (ONETOUCH VERIO FLEX SYSTEM) w/Device KIT, 1 each daily  bumetanide (BUMEX) 0.5 MG tablet, Take 1 tablet (0.5 mg) by mouth daily  carvedilol (COREG) 12.5 MG tablet, Take 1 tablet (12.5 mg) by mouth 2 times daily  cloZAPine (CLOZARIL) 100 MG tablet, Take 50 mg by mouth 2 times daily  cloZAPine (CLOZARIL) 50 MG tablet, 50 mg daily  Continuous Blood Gluc Sensor (DEXCOM G6 SENSOR) MISC, 1 each every 10 days. Change every 10 days. USE TO READ BLOOD SUGARS PER  INSTRUCTIONS  Continuous Blood Gluc Sensor (DEXCOM G7 SENSOR) MISC, 1 Device every 10 days  Continuous Blood Gluc Sensor (GUARDIAN 4 GLUCOSE SENSOR) MISC, 1 each every 7 days Change sensor every 7 days  Continuous Blood Gluc Transmit (DEXCOM G6 TRANSMITTER) MISC, 1 each every 3 months Change every 3 months. USE TO READ BLOOD SUGARS PER  INSTRUCTIONS  FLUoxetine (PROZAC) 40 MG capsule, Take 40 mg by mouth daily  glucose (BD GLUCOSE) 4 g chewable tablet, Take 1 tablet by mouth every hour as needed for low blood sugar  hydrOXYzine (ATARAX)  "25 MG tablet, Take 1 Tablet (25 mg) by mouth 3 times daily if needed for Anxiety.  Insulin Aspart FlexPen 100 UNIT/ML SOPN, Inject 1-14 Units Subcutaneous 3 times daily (before meals) Inject 3 times daily with meals as instructed.  Max TDD 30.  insulin glargine (LANTUS SOLOSTAR) 100 UNIT/ML pen, Inject 32 Units Subcutaneous every morning  insulin pen needle (32G X 6 MM) 32G X 6 MM miscellaneous, Use 4 pen needles daily.  insulin pen needle (ULTICARE MINI) 31G X 6 MM miscellaneous, USE 1 PEN NEEDLE 4 times daily  lamoTRIgine (LAMICTAL) 100 MG tablet, Take 100 mg by mouth At Bedtime  loperamide (IMODIUM A-D) 2 MG tablet, 2 caplets after loose stool then 1 after each loose stool do not exceed 4 in 24hrs  loratadine (CLARITIN) 10 MG tablet, Take 1 tablet (10 mg) by mouth every morning  omeprazole (PRILOSEC) 20 MG DR capsule, TAKE 1 CAPSULE BY MOUTH EVERY DAY  OneTouch Delica Lancets 33G MISC, 1 each 4 times daily  QUEtiapine (SEROQUEL) 25 MG tablet, Take 25 mg by mouth 1 tablet 4 times daily as needed for agitation or hallucinations  topiramate (TOPAMAX) 50 MG tablet, Take 1 tablet (50 mg) by mouth 2 times daily  traZODone (DESYREL) 50 MG tablet, 50 mg as needed  COMPOUNDED NON-CONTROLLED SUBSTANCE (CMPD RX) - PHARMACY TO MIX COMPOUNDED MEDICATION, Chloramphenicol 50 mg, sulfamethoxazole 50 mg, amphotericin B 5 mg, hydrocortisone 1 mg. 2-3 puffs to affected ear PRN itching/drainage (Patient not taking: Reported on 7/13/2023)  fluocinolone acetonide 0.01 % OIL, Place 4 drops in ear(s) 2 times daily as needed (ear itching) (Patient not taking: Reported on 7/13/2023)  Insulin Infusion Pump (MINIMED 780G INSULIN PUMP) KIT, 1 each daily SmartGuard Target: 100; Active Insulin Time: 2 hours (recommended); SmartGuardTM Warmup/Manual Mode: Suspend before low (recommended) (Patient not taking: Reported on 8/22/2023)  Insulin Infusion Pump Supplies (EXTENDED INFUSION SET 23\"/6MM) MISC, 1 each every 7 days Max Total Daily Dose 70 " units; Change infusion set & reservoir every 7 days (recommended) (Patient not taking: Reported on 10/19/2023)  Insulin Infusion Pump Supplies (EXTENDED RESERVOIR 3ML) MISC, 1 each every 7 days (Patient not taking: Reported on 8/22/2023)    No current facility-administered medications on file prior to visit.    Exam:  BP (!) 143/63 (BP Location: Left arm, Patient Position: Sitting, Cuff Size: Adult Large)   Pulse 74   Wt 103.4 kg (228 lb)   SpO2 94%   BMI 39.14 kg/m    GENERAL: Healthy, alert and no distress    Results:  Lab on 12/11/2023   Component Date Value Ref Range Status    Sodium 12/11/2023 142  135 - 145 mmol/L Final    Reference intervals for this test were updated on 09/26/2023 to more accurately reflect our healthy population. There may be differences in the flagging of prior results with similar values performed with this method. Interpretation of those prior results can be made in the context of the updated reference intervals.     Potassium 12/11/2023 4.2  3.4 - 5.3 mmol/L Final    Chloride 12/11/2023 108 (H)  98 - 107 mmol/L Final    Carbon Dioxide (CO2) 12/11/2023 26  22 - 29 mmol/L Final    Anion Gap 12/11/2023 8  7 - 15 mmol/L Final    Glucose 12/11/2023 75  70 - 99 mg/dL Final    Urea Nitrogen 12/11/2023 23.9 (H)  6.0 - 20.0 mg/dL Final    Creatinine 12/11/2023 1.41 (H)  0.51 - 0.95 mg/dL Final    GFR Estimate 12/11/2023 49 (L)  >60 mL/min/1.73m2 Final    Calcium 12/11/2023 8.9  8.6 - 10.0 mg/dL Final    Albumin 12/11/2023 3.8  3.5 - 5.2 g/dL Final    Phosphorus 12/11/2023 2.4 (L)  2.5 - 4.5 mg/dL Final    Hemoglobin 12/11/2023 12.0  11.7 - 15.7 g/dL Final        Assessment/Plan:   1. CKD Stage 3: risk factors for kidney disease include longstanding hypertension, diabetes, as well as longterm previous lithium use. She is now away from lithium which is great to see given she is already at risk for diabetic nephropathy and would like to minimize risk.  Last UPCR was normal off of lisinopril.  Urine studies pending today. If present, would have low threshold to add back low dose lisinopril. If no proteinuria, however, may hold off given hemodynamic/KIRSTIE events that occurred while on ACE-I this summer.     2. DM: Her A1c at this time is 8.3%  - stress the importance of good diabetes control.     3. Bipolar: now off of lithium    4. GERD: ideally would avoid PPI therapy given increased risk of incident CKD and AIN noted with PPI therapy. Tolerating once daily dosing - I am not certain we will be able to get away from PPI completely given the severity of her sxs previously.     5. Hypertension/swelling: asked her to monitor home readings with her home health nurse when seated and at rest for 5 minutes. Educated on benefits of low sodium diet and to monitor labels in the grocery store. Encoruage avoidance of caffeine.     6. Leukocytosis: hematology referral    7. Sleep difficulties: encouraged her to discuss with her psychiatrist monse and has sleep study bhavin planned for March.     Patient Instructions   Monitor blood pressure at home - please update if above 130/80 on home readings  Minimize sodium intake - goal is 2000 mg per day  I will update you on your labs when they return.   Follow-up in 6 months with labs prior.      31 minutes spent by me on the date of the encounter doing chart review, review of test results, interpretation of tests, patient visit, and documentation     Sánchez Dias, DO

## 2023-12-11 NOTE — PATIENT INSTRUCTIONS
1. Ziopatch for rhythm evaluation  2. Nuclear stress test   3. Follow up with DEEPTI in 1-2 months

## 2023-12-11 NOTE — NURSING NOTE
Divina Delgadillo's goals for this visit include:   Chief Complaint   Patient presents with    RECHECK     She requests these members of her care team be copied on today's visit information: yes    PCP: Jaleesa Velasquez    Referring Provider:  VERONIQUE Bledsoe [facility]  [address] [place] [address]    BP (!) 143/63 (BP Location: Left arm, Patient Position: Sitting, Cuff Size: Adult Large)   Pulse 74   Wt 103.4 kg (228 lb)   SpO2 94%   BMI 39.14 kg/m      Do you need any medication refills at today's visit? no    DIEGO Levy  Adult Endocrinology  [facility]

## 2023-12-11 NOTE — LETTER
12/11/2023    Jaleesa ROSALINAElke Deniseashlyn, APRN CNP  [address] [facility] [place] [address]    RE: Divina Delgadillo       Dear Colleague,     I had the pleasure of seeing Divina K Leona in the [facility].            HPI:     This is a 38 year old here for syncope back this summer. She has a history of Bipolar, HTN and T2DM, Chiari malformation here for dizziness. She is here with .    Has sleep medicine referral for difficulty sleeping, getting sleep study. Fatigued, sleeps only an hour a night.     Father with heart valve replacement, paternal grandmother with heart valve replacement (father at 35 years old).     Smoking off and on since 2005.     Has occasional CP, no sob, leg swelling. Has daily dizziness. Had echocardiogram which was normal.     ASSESSMENT/PLAN:    1.  CP: risk factors for CAD include HTN, DM, fam history, smoking. Will check nuclear treadmill test     2. Lightheadedness and syncope: evaluate for rhythm abnormalities, heart blocker  -check ziopatch     Follow up in 1-2 months with DEEPTI     Lyn Herman MD [facility]    PAST MEDICAL HISTORY  Past Medical History:   Diagnosis Date    Acquired asplenia     Acute pain of left knee 03/22/2019    Acute-on-chronic kidney injury  03/22/2019    ARF (acute renal failure) (H24) 03/23/2019    Asthma     Bipolar disorder (H)     Cardiac murmur     Cervical high risk HPV (human papillomavirus) test positive 06/20/2017    Chiari malformation type I (H)     Chronic bilateral low back pain without sciatica     Deep venous thrombosis of arm (H) 01/06/2017    ultrasound 1/6/2017-  venous thrombus in the antecubital fossa region and the left forearm as described    Depressive disorder     DVT (deep venous thrombosis) (H)     DVT of upper extremity (deep vein thrombosis) (H) 2021    Esophageal reflux     GERD    Hypertension     Insomnia     Leukocytosis     Mild intermittent asthma     Morbid obesity (H)     Mucinous neoplasm of pancreas     NAFL  (nonalcoholic fatty liver)     Neck pain     Nicotine abuse     Other specified types of schizophrenia, unspecified condition     Schizoaffective disorder, depressive type (H)     Stage 3a chronic kidney disease (CKD) (H)     Type 2 diabetes mellitus (H)     Ulnar neuropathy of both upper extremities     Vitamin D insufficiency        CURRENT MEDICATIONS  Current Outpatient Medications   Medication Sig Dispense Refill    ACCU-CHEK GUIDE test strip Use to test blood sugar 3 times daily or as directed. 150 strip 1    acetaminophen (TYLENOL) 325 MG tablet Take 325-650 mg by mouth 2 tab every 4-6 hours as needed, do not exceed 9 tabs in 24hours      albuterol (VENTOLIN HFA) 108 (90 Base) MCG/ACT inhaler INHALE 2 PUFFS INTO THE LUNGS EVERY SIX  HOURS AS NEEDED FOR SHORTNESS OF BREATH 18 g 10    ARIPiprazole (ABILIFY) 30 MG tablet Take 30 mg by mouth daily      atorvastatin (LIPITOR) 10 MG tablet Take 1 tablet (10 mg) by mouth daily 90 tablet 2    Biotin 5 MG TABS Take 1 tablet by mouth daily 30 tablet 5    Biotin 5000 MCG CAPS Take 1 tablet by mouth daily at 2 pm      blood glucose (NO BRAND SPECIFIED) test strip Use to test blood sugar 4 times daily or as directed. 100 strip 3    blood glucose (ONETOUCH VERIO IQ) test strip Use to test blood sugar up to 3 times daily 100 strip 11    Blood Glucose Monitoring Suppl (ONETOUCH VERIO FLEX SYSTEM) w/Device KIT 1 each daily 1 kit 0    bumetanide (BUMEX) 0.5 MG tablet Take 1 tablet (0.5 mg) by mouth daily 30 tablet 2    carvedilol (COREG) 12.5 MG tablet Take 1 tablet (12.5 mg) by mouth 2 times daily 60 tablet 11    cloZAPine (CLOZARIL) 100 MG tablet Take 50 mg by mouth 2 times daily      cloZAPine (CLOZARIL) 50 MG tablet 50 mg daily      Continuous Blood Gluc Sensor (DEXCOM G6 SENSOR) MISC 1 each every 10 days. Change every 10 days. USE TO READ BLOOD SUGARS PER  INSTRUCTIONS 12 each 1    Continuous Blood Gluc Sensor (DEXCOM G7 SENSOR) MISC 1 Device every 10 days 3  each 5    Continuous Blood Gluc Sensor (GUARDIAN 4 GLUCOSE SENSOR) MISC 1 each every 7 days Change sensor every 7 days 15 each 4    Continuous Blood Gluc Transmit (DEXCOM G6 TRANSMITTER) MISC 1 each every 3 months Change every 3 months. USE TO READ BLOOD SUGARS PER  INSTRUCTIONS 1 each 1    FLUoxetine (PROZAC) 40 MG capsule Take 40 mg by mouth daily      glucose (BD GLUCOSE) 4 g chewable tablet Take 1 tablet by mouth every hour as needed for low blood sugar 30 tablet 4    hydrOXYzine (ATARAX) 25 MG tablet Take 1 Tablet (25 mg) by mouth 3 times daily if needed for Anxiety.      Insulin Aspart FlexPen 100 UNIT/ML SOPN Inject 1-14 Units Subcutaneous 3 times daily (before meals) Inject 3 times daily with meals as instructed.  Max TDD 30. 30 mL 4    insulin glargine (LANTUS SOLOSTAR) 100 UNIT/ML pen Inject 32 Units Subcutaneous every morning 45 mL 4    insulin pen needle (32G X 6 MM) 32G X 6 MM miscellaneous Use 4 pen needles daily. 150 each 10    insulin pen needle (ULTICARE MINI) 31G X 6 MM miscellaneous USE 1 PEN NEEDLE 4 times daily 400 each 11    lamoTRIgine (LAMICTAL) 100 MG tablet Take 100 mg by mouth At Bedtime      loperamide (IMODIUM A-D) 2 MG tablet 2 caplets after loose stool then 1 after each loose stool do not exceed 4 in 24hrs      loratadine (CLARITIN) 10 MG tablet Take 1 tablet (10 mg) by mouth every morning 28 tablet 10    omeprazole (PRILOSEC) 20 MG DR capsule TAKE 1 CAPSULE BY MOUTH EVERY DAY 28 capsule 2    OneTouch Delica Lancets 33G MISC 1 each 4 times daily 100 each 11    QUEtiapine (SEROQUEL) 25 MG tablet Take 25 mg by mouth 1 tablet 4 times daily as needed for agitation or hallucinations      topiramate (TOPAMAX) 50 MG tablet Take 1 tablet (50 mg) by mouth 2 times daily 60 tablet 3    traZODone (DESYREL) 50 MG tablet 50 mg as needed      COMPOUNDED NON-CONTROLLED SUBSTANCE (CMPD RX) - PHARMACY TO MIX COMPOUNDED MEDICATION Chloramphenicol 50 mg, sulfamethoxazole 50 mg, amphotericin B  "5 mg, hydrocortisone 1 mg. 2-3 puffs to affected ear PRN itching/drainage (Patient not taking: Reported on 7/13/2023) 10 capsule 1    fluocinolone acetonide 0.01 % OIL Place 4 drops in ear(s) 2 times daily as needed (ear itching) (Patient not taking: Reported on 7/13/2023) 20 mL 3    Insulin Infusion Pump (MINIMED 780G INSULIN PUMP) KIT 1 each daily SmartGuard Target: 100; Active Insulin Time: 2 hours (recommended); SmartGuardTM Warmup/Manual Mode: Suspend before low (recommended) (Patient not taking: Reported on 8/22/2023) 1 kit 0    Insulin Infusion Pump Supplies (EXTENDED INFUSION SET 23\"/6MM) MISC 1 each every 7 days Max Total Daily Dose 70 units; Change infusion set & reservoir every 7 days (recommended) (Patient not taking: Reported on 10/19/2023) 10 each 6    Insulin Infusion Pump Supplies (EXTENDED RESERVOIR 3ML) MISC 1 each every 7 days (Patient not taking: Reported on 8/22/2023) 10 each 11       PAST SURGICAL HISTORY:  Past Surgical History:   Procedure Laterality Date    ADRENALECTOMY Left 2015    BIOPSY      BREAST SURGERY  2013    COLONOSCOPY      ENT SURGERY  10/01/2000    Tympanoplasty    IR LIVER BIOPSY PERCUTANEOUS  11/14/2019    PANCREATECTOMY PARTIAL  2015    PERCUTANEOUS BIOPSY LIVER Right 11/14/2019    Procedure: Percutaneous Liver Biopsy;  Surgeon: Sean Guzman PA-C;  Location: [facility] OR    SPLENECTOMY  2015    TONSILLECTOMY  10/01/2000    Tonsillectomy       ALLERGIES     Allergies   Allergen Reactions    Acetaminophen Other (See Comments)     pt previously tried to overdose on it, PMD does not want pt taking per pt.     Latex Rash       FAMILY HISTORY  Family History   Problem Relation Age of Onset    Respiratory Mother         ASTHMA    Allergies Mother     Depression Mother     Chronic Obstructive Pulmonary Disease Mother     Anxiety Disorder Mother     Mental Illness Mother     Asthma Mother     Heart Disease Father         HEART VALVE REPLACEMENT    Hypertension Father     " Diabetes Father     Depression Father     Anxiety Disorder Father     Mental Illness Father     Obesity Father     Respiratory Sister     Allergies Sister     Depression Sister     Respiratory Sister     Allergies Sister     Depression Sister     Respiratory Brother     Allergies Brother     Kidney Disease No family hx of        SOCIAL HISTORY  Social History     Socioeconomic History    Marital status: Single     Spouse name: Not on file    Number of children: 0    Years of education: Not on file    Highest education level: Not on file   Occupational History    Not on file   Tobacco Use    Smoking status: Every Day     Packs/day: .5     Types: Vaping Device, Cigarettes     Start date: 11/27/2023     Passive exposure: Yes    Smokeless tobacco: Never    Tobacco comments:     A pack every 3 days, stopped for a while 02/12/2023   Vaping Use    Vaping Use: Every day    Substances: Nicotine, Flavoring    Devices: Disposable   Substance and Sexual Activity    Alcohol use: No    Drug use: No    Sexual activity: Yes     Partners: Female     Birth control/protection: I.U.D.     Comment: Both male and female    Other Topics Concern    Parent/sibling w/ CABG, MI or angioplasty before 65F 55M? No   Social History Narrative    Not on file     Social Determinants of Health     Financial Resource Strain: High Risk (10/19/2023)    Financial Resource Strain     Within the past 12 months, have you or your family members you live with been unable to get utilities (heat, electricity) when it was really needed?: Yes   Food Insecurity: Low Risk  (10/19/2023)    Food Insecurity     Within the past 12 months, did you worry that your food would run out before you got money to buy more?: No     Within the past 12 months, did the food you bought just not last and you didn t have money to get more?: No   Transportation Needs: Low Risk  (10/19/2023)    Transportation Needs     Within the past 12 months, has lack of transportation kept you from  "medical appointments, getting your medicines, non-medical meetings or appointments, work, or from getting things that you need?: No   Physical Activity: Not on file   Stress: Not on file   Social Connections: Not on file   Interpersonal Safety: Low Risk  (10/19/2023)    Interpersonal Safety     Do you feel physically and emotionally safe where you currently live?: Yes     Within the past 12 months, have you been hit, slapped, kicked or otherwise physically hurt by someone?: No     Within the past 12 months, have you been humiliated or emotionally abused in other ways by your partner or ex-partner?: No   Housing Stability: Low Risk  (10/19/2023)    Housing Stability     Do you have housing? : Yes     Are you worried about losing your housing?: No       ROS:   Constitutional: No fever, chills, or sweats. No weight gain/loss   ENT: No visual disturbance, ear ache, epistaxis, sore throat  Allergies/Immunologic: Negative  Respiratory: No cough, hemoptysia  Cardiovascular: As per HPI  GI: No nausea, vomiting, hematemesis, melena, or hematochezia  : No urinary frequency, dysuria, or hematuria  Integument: Negative  Psychiatric: Negative  Neuro: Negative  Endocrinology: Negative   Musculoskeletal: Negative  Vascular: No walking impairment, claudication, ischemic rest pain or nonhealing wounds    EXAM:  /79 (BP Location: Right arm, Patient Position: Sitting, Cuff Size: Adult Regular)   Pulse 74   Resp 16   Ht 1.626 m (5' 4\")   Wt 103.4 kg (228 lb)   SpO2 100%   BMI 39.14 kg/m    In general, the patient is a pleasant female in no apparent distress.    HEENT: NC/AT.  PERRLA.  EOMI.  Sclerae white, not injected.   Neck: No adenopathy.  No thyromegaly. Carotids +2/2 bilaterally without bruits.  No jugular venous distension.   Heart: RRR. Normal S1, S2 splits physiologically. No murmur, rub, click, or gallop.   Lungs: CTA.  No ronchi, wheezes, rales.  No dullness to percussion.   Abdomen: Soft, nontender, " nondistended. No organomegaly. No AAA.  No bruits.   Extremities: No clubbing, cyanosis, or edema.  No wounds. No varicose veins signs of chronic venous insufficiency.   Vascular: No bruits are noted.    Labs:  LIPID RESULTS:  Lab Results   Component Value Date    CHOL 175 12/21/2022    CHOL 147 03/26/2021    HDL 72 12/21/2022    HDL 57 03/26/2021    LDL 81 12/21/2022    LDL 72 03/26/2021    TRIG 108 12/21/2022    TRIG 90 03/26/2021    CHOLHDLRATIO 2.0 03/12/2009    NHDL 103 12/21/2022    NHDL 90 03/26/2021       LIVER ENZYME RESULTS:  Lab Results   Component Value Date    AST 33 08/02/2023    AST 38 07/08/2021    ALT 68 (H) 08/02/2023    ALT 65 (H) 07/08/2021       CBC RESULTS:  Lab Results   Component Value Date    WBC 20.3 (H) 11/30/2023    WBC 14.8 (H) 07/08/2021    RBC 3.93 11/30/2023    RBC 4.21 07/08/2021    HGB 12.0 12/11/2023    HGB 12.9 07/08/2021    HCT 38.1 11/30/2023    HCT 39.8 07/08/2021    MCV 97 11/30/2023    MCV 95 07/08/2021    MCH 29.0 11/30/2023    MCH 30.6 07/08/2021    MCHC 29.9 (L) 11/30/2023    MCHC 32.4 07/08/2021    RDW 14.3 11/30/2023    RDW 14.3 07/08/2021     11/30/2023     07/08/2021       BMP RESULTS:  Lab Results   Component Value Date     12/11/2023     07/08/2021    POTASSIUM 4.2 12/11/2023    POTASSIUM 5.1 12/19/2022    POTASSIUM 4.4 07/08/2021    CHLORIDE 108 (H) 12/11/2023    CHLORIDE 112 (H) 12/19/2022    CHLORIDE 108 07/08/2021    CO2 26 12/11/2023    CO2 26 12/19/2022    CO2 26 07/08/2021    ANIONGAP 8 12/11/2023    ANIONGAP 3 12/19/2022    ANIONGAP 5 07/08/2021    GLC 75 12/11/2023     (H) 09/18/2023     (H) 12/19/2022     (H) 07/08/2021    BUN 23.9 (H) 12/11/2023    BUN 19 12/19/2022    BUN 22 07/08/2021    CR 1.41 (H) 12/11/2023    CR 1.32 (H) 07/08/2021    GFRESTIMATED 49 (L) 12/11/2023    GFRESTIMATED 52 (L) 07/08/2021    GFRESTBLACK 60 (L) 07/08/2021    CLAY 8.9 12/11/2023    CLAY 9.2 07/08/2021        A1C RESULTS:  Lab  Results   Component Value Date    A1C 8.3 (H) 09/13/2023    A1C 7.4 (H) 07/08/2021   Thank you for allowing me to participate in the care of your patient.      Sincerely,     Lyn Herman MD     [facility]  cc:   [place]  [address] [place] [address]

## 2023-12-12 ENCOUNTER — VIRTUAL VISIT (OUTPATIENT)
Dept: PHARMACY | Facility: CLINIC | Age: 38
End: 2023-12-12
Payer: COMMERCIAL

## 2023-12-12 ENCOUNTER — PATIENT OUTREACH (OUTPATIENT)
Dept: FAMILY MEDICINE | Facility: CLINIC | Age: 38
End: 2023-12-12
Payer: COMMERCIAL

## 2023-12-12 DIAGNOSIS — R42 DIZZINESS: Primary | ICD-10-CM

## 2023-12-12 PROCEDURE — 99606 MTMS BY PHARM EST 15 MIN: CPT | Mod: 93 | Performed by: PHARMACIST

## 2023-12-12 NOTE — DISCHARGE INSTRUCTIONS
You were seen and evaluated in the ER for left thigh pain.  You had an ultrasound that did not show any blood clots.  I am not sure what is causing your pain, but it does not appear to be anything dangerous.  I suspect that is due to muscle pain.  You should get plenty of rest and use Tylenol as needed for pain.  Try to stretch the muscles as much as you are able.  Follow-up with your doctor as scheduled in 2 days to make sure your condition is improving.  Return to the ER with new or worsening symptoms.

## 2023-12-12 NOTE — PATIENT INSTRUCTIONS
"Recommendations from today's MTM visit:                                                       Divina,    It was a pleasure talking with you! We discussed the followin. Talk to psychiatry today as planned about switching Abilify to the evening and if no improvement in dizziness - could consider switching fluoxetine to the evening as well. Monitor dizziness and sleep.     2. Please feel free to reach out to me if you would like options to help quit smoking.      Follow-up:  at 10:30 AM    It was great speaking with you today.  I value your experience and would be very thankful for your time in providing feedback in our clinic survey. In the next few days, you may receive an email or text message from [email or web address] with a link to a survey related to your  clinical pharmacist.\"     To schedule another MTM appointment, please call the clinic directly or you may call the MTM scheduling line at [phone] or toll-free at [phone].     My Clinical Pharmacist's contact information:                                                      Please feel free to contact me with any questions or concerns you have.      Keep up the good work!!    Leana Hollis, PharmD  [phone] in clinic on Tuesday and Wednesday        "

## 2023-12-12 NOTE — ED TRIAGE NOTES
Pt presents with left upper thigh pain x 1.5 weeks. Pt reports NWB, and swelling noted to site. No recent travel.      Triage Assessment (Adult)       Row Name 12/11/23 2038          Triage Assessment    Airway WDL WDL        Respiratory WDL    Respiratory WDL WDL        Skin Circulation/Temperature WDL    Skin Circulation/Temperature WDL WDL        Cardiac WDL    Cardiac WDL WDL        Peripheral/Neurovascular WDL    Peripheral Neurovascular WDL WDL

## 2023-12-12 NOTE — PATIENT INSTRUCTIONS
Schedule follow up appointment for yearly visit and lab work with PCP, do that as soon as possible to recheck oxygen levels.  If worsening, seek emergent care.  Will be notified of pending x ray.  Take the prednisone as prescribed. Use inhaler as needed.  Will be notified of pending swab results.      Our Clinic hours are:  Mondays    7:20 am - 7 pm  Tues -  Fri  7:20 am - 5 pm    Clinic Phone: [phone]    The clinic lab opens at 7:30 am Mon - Fri and appointments are required.    [facility]  Ph. [phone]  Monday  8 am - 7pm  Tues - Fri 8 am - 5:30 pm           No

## 2023-12-12 NOTE — TELEPHONE ENCOUNTER
"ED / Discharge Outreach Protocol    Patient Contact    Attempt # 1    Was call answered?  Yes.  \"May I please speak with Divina\"  Is patient available?   Yes       ED for acute condition Discharge Protocol    \"Hi, my name is Laureen More RN, a registered nurse, and I am calling from [facility].  I am calling to follow up and see how things are going for you after your recent emergency visit.\"    Tell me how you are doing now that you are home?\" Pain is better. Swelling is better.       Discharge Instructions    \"Let's review your discharge instructions.  What is/are the follow-up recommendations?  Pt. Response: Follow-up with PCP    \"Has an appointment with your primary care provider been scheduled?\"  Yes. (confirm and remind to bring meds)    Medications    \"Tell me what changed about your medicines when you discharged?\"    No change.     \"What questions do you have about your medications?\"   None        Call Summary    \"What questions or concerns do you have about your recent visit and your follow-up care?\"     none    \"If you have questions or things don't continue to improve, we encourage you contact us through the main clinic number (give number).  Even if the clinic is not open, triage nurses are available 24/7 to help you.     We would like you to know that our clinic has extended hours (provide information).  We also have urgent care (provide details on closest location and hours/contact info)\"    \"Thank you for your time and take care!\"    Laureen GONZALEZ RN  [facility]  [phone]      "

## 2023-12-12 NOTE — ED NOTES
Writer rounded on pt. Updated on wait time. Offered pt warm blankets while she waits. Pt accepted.

## 2023-12-12 NOTE — ED PROVIDER NOTES
History     Chief Complaint   Patient presents with    Leg Swelling     HPI  Divina Delgadillo is a 38 year old female who has GERD, hypertension, fatty liver, chronic kidney disease, type 2 diabetes, schizoaffective disorder, borderline personality disorder, history of left upper extremity DVT in 2017 not on anticoagulation who presents to the emergency department for evaluation of left leg pain.  Patient states that she has been having some pain in her left thigh for about the last week and a half.  She states that the pain became more severe today where she was having trouble walking so she decided to come get checked out.  She is concerned that she has a blood clot.  She reports pain and swelling in the upper thigh as well as swelling in the lower leg.  She denies any other symptoms.  No fevers.  No chest pain.  No trouble breathing.  She is not on estrogen birth control.  She has an IUD.  She denies recent travel.  She had surgery on her right elbow recently.  She denies any trauma to the area.    Allergies:  Allergies   Allergen Reactions    Acetaminophen Other (See Comments)     pt previously tried to overdose on it, PMD does not want pt taking per pt.     Latex Rash       Problem List:    Patient Active Problem List    Diagnosis Date Noted    Chiari malformation type I (H) 06/12/2023     Priority: Medium    Ulnar neuropathy of both upper extremities 09/20/2022     Priority: Medium    Insomnia, unspecified type 08/30/2021     Priority: Medium    History of DVT of arm  (deep vein thrombosis) 01/23/2021     Priority: Medium     ultrasound 1/6/2017-  venous thrombus in the antecubital fossa region and the left forearm as described      Schizoaffective disorder, depressive type (H) 07/19/2019     Priority: Medium    Acquired asplenia 03/22/2019     Priority: Medium    Chronic leukocytosis 11/27/2018     Priority: Medium     Due to spleen removal      Nicotine abuse 08/13/2018     Priority: Medium     "Uncontrolled type 2 diabetes mellitus with diabetic polyneuropathy, with long-term current use of insulin 01/24/2018     Priority: Medium    Mild intermittent asthma with acute exacerbation 01/16/2018     Priority: Medium    Morbid obesity (H) 01/09/2018     Priority: Medium    IUD (intrauterine device) in place 07/26/2017     Priority: Medium     Mirena IUD inserted in office with premed 7/26/2017        Cervical high risk HPV (human papillomavirus) test positive 06/20/2017     Priority: Medium     6/15/2017 Pap:NIL, +HR HPV \"other\" (not 16/18). Plan to repeat Pap+HPV in 1 year  6/14/2018 Pap: NIL/neg HPV. Plan Pap+HPV in 3 years (2021)  3/26/21 NIL pap, Neg HPV. Plan cotest in 3 years.       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, splenectomy, left adrenalectomy 02/26/2015;      Type 2 diabetes mellitus with stage 3 chronic kidney disease, with long-term current use of insulin (H) 02/27/2017     Priority: Medium    Bipolar disorder (H) 02/27/2017     Priority: Medium    Orthostatic hypotension 01/10/2017     Priority: Medium    Tobacco abuse 01/10/2017     Priority: Medium    Long term (current) use of anticoagulants 01/09/2017     Priority: Medium    Stage 3 chronic kidney disease 12/03/2015     Priority: Medium     Overview:   Updated per 10/1/17 IMO import      Vitamin D insufficiency 06/01/2015     Priority: Medium    Cardiac murmur 08/20/2013     Priority: Medium    Depressive disorder 09/15/2012     Priority: Medium    Hyperlipidemia LDL goal <100 10/31/2010     Priority: Medium    Borderline personality disorder (H) 11/06/2009     Priority: Medium    Essential hypertension 06/13/2008     Priority: Medium    NAFL (nonalcoholic fatty liver) 04/11/2006     Priority: Medium     See flowsheet for hepatic panel.   Stopped zocor, was on godon as well had right upper quandrant ultrasound and ct  ? Psych " med related vs SAHNI      Esophageal reflux 04/14/2005     Priority: Medium     GERD      PTSD (post-traumatic stress disorder) 01/01/2001     Priority: Medium        Past Medical History:    Past Medical History:   Diagnosis Date    Acquired asplenia     Acute pain of left knee 03/22/2019    Acute-on-chronic kidney injury  03/22/2019    ARF (acute renal failure) (H24) 03/23/2019    Asthma     Bipolar disorder (H)     Cardiac murmur     Cervical high risk HPV (human papillomavirus) test positive 06/20/2017    Chiari malformation type I (H)     Chronic bilateral low back pain without sciatica     Deep venous thrombosis of arm (H) 01/06/2017    Depressive disorder     DVT (deep venous thrombosis) (H)     DVT of upper extremity (deep vein thrombosis) (H) 2021    Esophageal reflux     Hypertension     Insomnia     Leukocytosis     Mild intermittent asthma     Morbid obesity (H)     Mucinous neoplasm of pancreas     NAFL (nonalcoholic fatty liver)     Neck pain     Nicotine abuse     Other specified types of schizophrenia, unspecified condition     Schizoaffective disorder, depressive type (H)     Stage 3a chronic kidney disease (CKD) (H)     Type 2 diabetes mellitus (H)     Ulnar neuropathy of both upper extremities     Vitamin D insufficiency        Past Surgical History:    Past Surgical History:   Procedure Laterality Date    ADRENALECTOMY Left 2015    BIOPSY      BREAST SURGERY  2013    COLONOSCOPY      ENT SURGERY  10/01/2000    Tympanoplasty    IR LIVER BIOPSY PERCUTANEOUS  11/14/2019    PANCREATECTOMY PARTIAL  2015    PERCUTANEOUS BIOPSY LIVER Right 11/14/2019    Procedure: Percutaneous Liver Biopsy;  Surgeon: Sean Guzman PA-C;  Location: [facility] OR    SPLENECTOMY  2015    TONSILLECTOMY  10/01/2000    Tonsillectomy       Family History:    Family History   Problem Relation Age of Onset    Respiratory Mother         ASTHMA    Allergies Mother     Depression Mother     Chronic Obstructive Pulmonary Disease  Mother     Anxiety Disorder Mother     Mental Illness Mother     Asthma Mother     Heart Disease Father         HEART VALVE REPLACEMENT    Hypertension Father     Diabetes Father     Depression Father     Anxiety Disorder Father     Mental Illness Father     Obesity Father     Respiratory Sister     Allergies Sister     Depression Sister     Respiratory Sister     Allergies Sister     Depression Sister     Respiratory Brother     Allergies Brother     Kidney Disease No family hx of        Social History:  Marital Status:  Single [1]  Social History     Tobacco Use    Smoking status: Every Day     Packs/day: .5     Types: Vaping Device, Cigarettes     Start date: 11/27/2023     Passive exposure: Yes    Smokeless tobacco: Never    Tobacco comments:     A pack every 3 days, stopped for a while 02/12/2023   Vaping Use    Vaping Use: Every day    Substances: Nicotine, Flavoring    Devices: Disposable   Substance Use Topics    Alcohol use: No    Drug use: No        Medications:    ACCU-CHEK GUIDE test strip  acetaminophen (TYLENOL) 325 MG tablet  albuterol (VENTOLIN HFA) 108 (90 Base) MCG/ACT inhaler  ARIPiprazole (ABILIFY) 30 MG tablet  atorvastatin (LIPITOR) 10 MG tablet  Biotin 5 MG TABS  Biotin 5000 MCG CAPS  blood glucose (NO BRAND SPECIFIED) test strip  blood glucose (ONETOUCH VERIO IQ) test strip  Blood Glucose Monitoring Suppl (ONETOUCH VERIO FLEX SYSTEM) w/Device KIT  bumetanide (BUMEX) 0.5 MG tablet  carvedilol (COREG) 12.5 MG tablet  cloZAPine (CLOZARIL) 100 MG tablet  cloZAPine (CLOZARIL) 50 MG tablet  COMPOUNDED NON-CONTROLLED SUBSTANCE (CMPD RX) - PHARMACY TO MIX COMPOUNDED MEDICATION  Continuous Blood Gluc Sensor (DEXCOM G6 SENSOR) MISC  Continuous Blood Gluc Sensor (DEXCOM G7 SENSOR) MISC  Continuous Blood Gluc Sensor (GUARDIAN 4 GLUCOSE SENSOR) MISC  Continuous Blood Gluc Transmit (DEXCOM G6 TRANSMITTER) MISC  fluocinolone acetonide 0.01 % OIL  FLUoxetine (PROZAC) 40 MG capsule  glucose (BD GLUCOSE) 4 g  "chewable tablet  hydrOXYzine (ATARAX) 25 MG tablet  Insulin Aspart FlexPen 100 UNIT/ML SOPN  insulin glargine (LANTUS SOLOSTAR) 100 UNIT/ML pen  Insulin Infusion Pump (MINIMED 780G INSULIN PUMP) KIT  Insulin Infusion Pump Supplies (EXTENDED INFUSION SET 23\"/6MM) MISC  Insulin Infusion Pump Supplies (EXTENDED RESERVOIR 3ML) [employer] [facility]  insulin pen needle (32G X 6 MM) 32G X 6 MM miscellaneous  insulin pen needle (ULTICARE MINI) 31G X 6 MM miscellaneous  lamoTRIgine (LAMICTAL) 100 MG tablet  loperamide (IMODIUM A-D) 2 MG tablet  loratadine (CLARITIN) 10 MG tablet  omeprazole (PRILOSEC) 20 MG DR capsule  OneTouch Delica Lancets 33G MISC  QUEtiapine (SEROQUEL) 25 MG tablet  topiramate (TOPAMAX) 50 MG tablet  traZODone (DESYREL) 50 MG tablet          Review of Systems  See HPI  Physical Exam   BP: (!) 172/85  Pulse: 80  Temp: 98.4  F (36.9  C)  Resp: 18  Height: 162.6 cm (5' 4\")  SpO2: 95 %      Physical Exam  BP (!) 146/55   Pulse 78   Temp 98.4  F (36.9  C) (Tympanic)   Resp 18   Ht 1.626 m (5' 4\")   SpO2 96%   BMI 39.14 kg/m    General: alert, interactive, in no apparent distress  Head: atraumatic  Nose: no rhinorrhea or epistaxis  Ears: no external auditory canal discharge or bleeding.    Eyes: Sclera nonicteric. Conjunctiva noninjected. PERRL, EOMI  Mouth: no tonsillar erythema, edema, or exudate.  Moist mucous membranes  Neck: supple, moving spontaneously no midline cervical tenderness  Lungs: No increased work of breathing.  Clear to auscultation bilaterally.  CV: RRR, peripheral pulses palpable and symmetric  Abdomen: soft, nt, nd, no guarding or rebound.   Extremities: Warm and well-perfused.  Left thigh is tender to palpation.  There is no swelling, signs of injury and no erythema or skin changes.  The thigh appears similar to the contralateral side there is symmetric edema to the bilateral lower extremities.  Skin: no rash or diaphoresis  Neuro: CN II-XII grossly intact, strength 5/5 in UE and LEs bilaterally, " sensation intact to light touch in UE and LEs bilaterally;     ED Course                 Procedures           Critical Care time:  none         Results for orders placed or performed during the hospital encounter of 12/11/23 (from the past 24 hour(s))   US Lower Extremity Venous Duplex Left    Narrative    EXAM: US LOWER EXTREMITY VENOUS DUPLEX LEFT  LOCATION: [facility]  DATE: 12/11/2023    INDICATION: Left thigh swelling and pain, history of DVT  COMPARISON: None.  TECHNIQUE: Venous Duplex ultrasound of the left lower extremity with and without compression, augmentation and duplex. Color flow and spectral Doppler with waveform analysis performed.    FINDINGS: Exam includes the common femoral, femoral, popliteal, and contralateral common femoral veins as well as segmentally visualized deep calf veins and greater saphenous vein.     LEFT: No deep vein thrombosis. No superficial thrombophlebitis. No popliteal cyst.      Impression    IMPRESSION:  1.  No deep venous thrombosis in the left lower extremity.     *Note: Due to a large number of results and/or encounters for the requested time period, some results have not been displayed. A complete set of results can be found in Results Review.       Medications   ibuprofen (ADVIL/MOTRIN) tablet 600 mg (600 mg Oral Not Given 12/11/23 2247)   acetaminophen (TYLENOL) tablet 975 mg (975 mg Oral $Given 12/11/23 2300)   albuterol (PROVENTIL) neb solution 2.5 mg (2.5 mg Nebulization $Given 12/11/23 2326)       Assessments & Plan (with Medical Decision Making)     I have reviewed the nursing notes.    I have reviewed the findings, diagnosis, plan and need for follow up with the patient.      Medical Decision Making  Divina Delgadillo is a 38 year old female who has GERD, hypertension, fatty liver, chronic kidney disease, type 2 diabetes, schizoaffective disorder, borderline personality disorder, history of left upper extremity DVT in 2017 not on  anticoagulation who presents to the emergency department for evaluation of left leg pain.  Vital signs notable for hypertension, otherwise reassuring.  Patient has a reassuring exam, but given her history and concerns ultrasound was obtained to rule out DVT.  Ultrasound is negative for DVT.  Patient was reassessed and informed of her negative ultrasound.  She reports some improvement in pain with Tylenol.  She was able to stand and ambulate.  She had some pain in the thigh, but was able to independently ambulate.  Unclear etiology of her symptoms.  Reassured by negative ultrasound.  She has no skin changes over signs of trauma to suggest infection or other concerning diagnoses.  Likely soft tissue soreness.  Will discharge with instructions to follow-up with her regular doctor as scheduled later this week.  Tylenol as needed for pain.  Of note, once she got back from ultrasound, she was given planing of trouble breathing.  She felt like her asthma was flaring up.  She did have some mild wheezing.  She was given a neb treatment with complete resolution of her breathing.  She states that she has an albuterol inhaler at home to use.  Return precautions discussed.  All questions answered.  Patient discharged in stable condition.        New Prescriptions    No medications on file       Final diagnoses:   Pain of left thigh       12/11/2023   [facility] EMERGENCY DEPT       Esteban Chang MD  12/11/23 1125

## 2023-12-12 NOTE — PROGRESS NOTES
Medication Therapy Management (MTM) Encounter    ASSESSMENT:                            Medication Adherence/Access: No issues identified    Dizziness: Recommend patient talk to psychiatry as planned about switching Abilify to the evening and if no improvement in dizziness - could consider switching fluoxetine to the evening as well. Monitor dizziness and sleep.       PLAN:                            1. Patient to talk to psychiatry today as planned about switching Abilify to the evening and if no improvement in dizziness - could consider switching fluoxetine to the evening as well. Monitor dizziness and sleep.     Follow-up: Tuesday, February 13th at 10:30 AM    SUBJECTIVE/OBJECTIVE:                          Divina Delgadillo is a 38 year old female called for a follow-up visit from 11/8/2023.       Reason for visit: follow up MTM visit.    Tobacco: She reports that she has been smoking vaping device and cigarettes. She started smoking about 2 weeks ago. She has been smoking an average of .5 packs per day. She has been exposed to tobacco smoke. She has never used smokeless tobacco.Nicotine/Tobacco Cessation Plan: Information offered: Patient not interested at this time - patient states she started smoking again. She doesn't think she will be smoking much longer - stopping cold turkey works well for her. She will reach out to me if she would like help in stopping smoking.   Alcohol: not currently using    Medication Adherence/Access: no issues reported    Dizziness: Patient has visit with psychiatry today at 1:30 PM and has my note in front of her to review changing Abilify and/or fluoxetine to the evening. She states dizziness is not gone, but improved some without any medication changes.     Per last visit: You are on several medications that have risk for dizziness. Only two of your medications were started/dose increased close to the time you started noticing symptoms of dizziness - those are topiramate and  carvedilol. Your dizziness may be an additive effect - please discuss with your psychiatrist about switching Abilify to the evening at your upcoming appointment. If no improvement - could consider switching fluoxetine to the evening as well. Monitor dizziness and sleep.     Patient is followed by psychiatry - Dr. Onur Ross.   ----------------    I spent 10 minutes with this patient today. All changes were made via collaborative practice agreement with VERONIQUE Spears CNP. A copy of the visit note was provided to the patient's provider(s).    A summary of these recommendations was sent via [employer].    Leana Hollis, PharmD  Medication Therapy Management     Telemedicine Visit Details  Type of service:  Telephone visit  Start Time:  10:30 AM  End Time:  10:40 AM     Medication Therapy Recommendations  No medication therapy recommendations to display

## 2023-12-13 ENCOUNTER — OFFICE VISIT (OUTPATIENT)
Dept: FAMILY MEDICINE | Facility: CLINIC | Age: 38
End: 2023-12-13
Payer: COMMERCIAL

## 2023-12-13 VITALS
OXYGEN SATURATION: 95 % | SYSTOLIC BLOOD PRESSURE: 142 MMHG | HEART RATE: 73 BPM | BODY MASS INDEX: 36.22 KG/M2 | TEMPERATURE: 98 F | RESPIRATION RATE: 12 BRPM | DIASTOLIC BLOOD PRESSURE: 52 MMHG | HEIGHT: 66 IN | WEIGHT: 225.4 LBS

## 2023-12-13 DIAGNOSIS — M54.42 ACUTE LEFT-SIDED LOW BACK PAIN WITH LEFT-SIDED SCIATICA: Primary | ICD-10-CM

## 2023-12-13 PROCEDURE — 99213 OFFICE O/P EST LOW 20 MIN: CPT | Performed by: FAMILY MEDICINE

## 2023-12-13 RX ORDER — CYCLOBENZAPRINE HCL 5 MG
5 TABLET ORAL 3 TIMES DAILY PRN
Qty: 20 TABLET | Refills: 0 | Status: SHIPPED | OUTPATIENT
Start: 2023-12-13 | End: 2024-02-14

## 2023-12-13 RX ORDER — PREDNISONE 20 MG/1
40 TABLET ORAL DAILY
Qty: 10 TABLET | Refills: 0 | Status: SHIPPED | OUTPATIENT
Start: 2023-12-13 | End: 2023-12-18

## 2023-12-13 ASSESSMENT — PATIENT HEALTH QUESTIONNAIRE - PHQ9
SUM OF ALL RESPONSES TO PHQ QUESTIONS 1-9: 9
SUM OF ALL RESPONSES TO PHQ QUESTIONS 1-9: 9
10. IF YOU CHECKED OFF ANY PROBLEMS, HOW DIFFICULT HAVE THESE PROBLEMS MADE IT FOR YOU TO DO YOUR WORK, TAKE CARE OF THINGS AT HOME, OR GET ALONG WITH OTHER PEOPLE: EXTREMELY DIFFICULT

## 2023-12-13 ASSESSMENT — ENCOUNTER SYMPTOMS
WHEEZING: 1
LEG PAIN: 1

## 2023-12-13 ASSESSMENT — PAIN SCALES - GENERAL: PAINLEVEL: EXTREME PAIN (8)

## 2023-12-13 NOTE — PATIENT INSTRUCTIONS
Start prednisone to possibly  treat acute sciatica.    Take cyclobenzaprine 5 mg 1 tablet 8 hours apart as needed for back stiffness or spasm.    Move as much as you are able to.  Rest if with increased pain.    Acetaminophen 500 mg orally 1-2 tabs every 4-6 hrs as needed for pain     If with leg weakness, numbness of leg,/foot, numbness of pelvic area, incontinence or worsening pain, see provider promptly.    If not better after 2-3 weeks, follow up in the clinic.    Perform gentle exercises as demonstrated to you and printed in your visit summary.

## 2023-12-13 NOTE — PROGRESS NOTES
Assessment & Plan     Acute left-sided low back pain with left-sided sciatica  No cauda equina red flags.  Discussed course and tx of acute sciatica.  Symptomatic measures discussed in detail. Literature also provided in AVS.  Advised judicious use of cyclobenzaprine, including possible ADR to the med.  Consider imaging if with new symptoms or if worsening in spite of 2-3 weeks of conservative treatment.   - cyclobenzaprine (FLEXERIL) 5 MG tablet  Dispense: 20 tablet; Refill: 0  - predniSONE (DELTASONE) 20 MG tablet  Dispense: 10 tablet; Refill: 0      Patient Instructions   Start prednisone to possibly  treat acute sciatica.    Take cyclobenzaprine 5 mg 1 tablet 8 hours apart as needed for back stiffness or spasm.    Move as much as you are able to.  Rest if with increased pain.    Acetaminophen 500 mg orally 1-2 tabs every 4-6 hrs as needed for pain     If with leg weakness, numbness of leg,/foot, numbness of pelvic area, incontinence or worsening pain, see provider promptly.    If not better after 2-3 weeks, follow up in the clinic.    Perform gentle exercises as demonstrated to you and printed in your visit summary.    Terrance Alvarez MD  [facility] GRACIE Murcia is a 38 year old, presenting for the following health issues:  Leg Pain (Left upper leg pain) and Allergies (Pt states is not allergic to acetaminophen)        12/13/2023    10:18 AM   Additional Questions   Roomed by Nicki HASTINGS   Accompanied by self       History of Present Illness       Reason for visit:  Left upper leg pain  Symptom onset:  1-2 weeks ago  Symptoms include:  Shooting pain in upper leg cant walk good  Symptom intensity:  Severe  Symptom progression:  Worsening  Had these symptoms before:  No  What makes it worse:  Walking  What makes it better:  Sitting    She eats 0-1 servings of fruits and vegetables daily.She consumes 2 sweetened beverage(s) daily.She exercises with enough effort to increase her  "heart rate 9 or less minutes per day.  She exercises with enough effort to increase her heart rate 3 or less days per week.   She is taking medications regularly.     No known injury.  Has tried stretching, ice, heat, rest.  No improvement.    Some slight tingling left inner thigh.  No numbness     Went to the ER this week - they ruled out DVT.        Back Pain     Duration: 2 weeks ago        Specific cause: none  Description:   Location of pain: low back left  Character of pain: dull ache and waxing and waning  Pain radiation:radiates into the left buttocks and radiates into the left leg but does not go beyond knee  New numbness or weakness in legs, not attributed to pain:  no   Intensity: moderate  History:   Pain interferes with job: YES  History of back problems: no prior back problems  Any previous MRI or X-rays: None  Sees a specialist for back pain:  No  Therapies tried without relief: acetaminophen (Tylenol)  Alleviating factors:   Improved by: rest    Precipitating factors:  Worsened by: Bending      Accompanying Signs & Symptoms:  Risk of Fracture:  None  Risk of Cauda Equina:  None  Risk of Infection:  None  Risk of Cancer:  None  Risk of Ankylosing Spondylitis:  Onset at age <35, male, AND morning back stiffness. no           Review of Systems   Respiratory:  Positive for wheezing.     Patient said the wheezing was when she was sick more than a week ago.  Constitutional, HEENT, cardiovascular, pulmonary, GI, , musculoskeletal, neuro, skin, endocrine and psych systems are negative, except as otherwise noted.      Objective    BP (!) 142/52 (BP Location: Right arm, Patient Position: Chair, Cuff Size: Adult Large)   Pulse 73   Temp 98  F (36.7  C) (Tympanic)   Resp 12   Ht 1.664 m (5' 5.5\")   Wt 102.2 kg (225 lb 6.4 oz)   SpO2 95%   BMI 36.94 kg/m    Body mass index is 36.94 kg/m .  Physical Exam   GENERAL: obese, normal gait,  alert and no distress  NECK: no tenderness, no adenopathy, no " asymmetry, no masses, no stiffness  MS: extremities- no gross deformities noted, no edema  SKIN: no suspicious lesions, no rashes  NEURO: strength and tone- normal, sensory exam- grossly normal, reflexes- symmetric  BACK: normal curvature, no deformity, no skin changes, no tendernes on palpation, range of motion limited by pain, SLR positive for pain on anterolateral thigh to just proximal to knee on left at 70 degrees elevation     No results found for any visits on 12/13/23.

## 2023-12-13 NOTE — COMMUNITY RESOURCES LIST (ENGLISH)
12/13/2023   [facility] [employer]  N/A  For questions about this resource list or additional care needs, please contact your primary care clinic or care manager.  Phone: [phone]   Email: N/A   Address: [address] [place] [id number]   Hours: N/A        Financial Stability       Utility payment assistance  1  [employer] Distance: 9.03 miles      In-Person, Phone/Virtual   [address] [place] [address]  Language: English  Hours: Mon - Sun Appt. Only  Fees: Free   Phone: (841) 648-5049 Email: tu@[employer] Website: http://www.[employer].Hypori     2  [facility] [employer] [place] ([facility] Office - Energy Assistance Program Distance: 11.35 miles      Phone/Virtual   [address] [place] [address]  Language: English  Hours: Mon - Fri 8:00 AM - 4:30 PM  Fees: Free   Phone: (839) 231-5794 Email: stormy@[employer] Website: https://www.[employer]/agency-information          Transportation       Free or low-cost transportation  3  [employer] - Free or low-cost transportation Distance: 23.44 miles      In-Person   [address] [place] [address]  Language: English  Hours: Mon - Fri 9:00 AM - 5:00 PM  Fees: Free   Phone: (906) 934-4145 Email: info@[employer] Website: https://www.[employer]/          Important Numbers & Websites       Emergency Services   [address]  [place] [address]  Poison Control   (950) 433-7353  Suicide Prevention Lifeline   (148) 842-1371 (TALK)  Child Abuse Hotline   (222) 305-3155 (4-A-Child)  Sexual Assault Hotline   (845) 457-4264 (HOPE)  National Runaway Safeline   (314) 338-4532 (RUNAWAY)  All-Options Talkline   (774) 321-8453  Substance Abuse Referral   (985) 183-1831 (HELP)

## 2023-12-21 ENCOUNTER — PRE VISIT (OUTPATIENT)
Dept: ONCOLOGY | Facility: CLINIC | Age: 38
End: 2023-12-21
Payer: COMMERCIAL

## 2023-12-21 ENCOUNTER — VIRTUAL VISIT (OUTPATIENT)
Dept: FAMILY MEDICINE | Facility: CLINIC | Age: 38
End: 2023-12-21
Payer: COMMERCIAL

## 2023-12-21 ENCOUNTER — LAB (OUTPATIENT)
Dept: LAB | Facility: CLINIC | Age: 38
End: 2023-12-21
Payer: COMMERCIAL

## 2023-12-21 DIAGNOSIS — Z79.899 HIGH RISK MEDICATION USE: ICD-10-CM

## 2023-12-21 DIAGNOSIS — G47.00 INSOMNIA, UNSPECIFIED TYPE: Primary | ICD-10-CM

## 2023-12-21 DIAGNOSIS — F25.0 SCHIZOAFFECTIVE DISORDER, BIPOLAR TYPE (H): ICD-10-CM

## 2023-12-21 LAB
BASOPHILS # BLD AUTO: 0.1 10E3/UL (ref 0–0.2)
BASOPHILS NFR BLD AUTO: 1 %
EOSINOPHIL # BLD AUTO: 0.3 10E3/UL (ref 0–0.7)
EOSINOPHIL NFR BLD AUTO: 1 %
ERYTHROCYTE [DISTWIDTH] IN BLOOD BY AUTOMATED COUNT: 14.3 % (ref 10–15)
HCT VFR BLD AUTO: 40 % (ref 35–47)
HGB BLD-MCNC: 12.3 G/DL (ref 11.7–15.7)
IMM GRANULOCYTES # BLD: 0.2 10E3/UL
IMM GRANULOCYTES NFR BLD: 1 %
LYMPHOCYTES # BLD AUTO: 4.5 10E3/UL (ref 0.8–5.3)
LYMPHOCYTES NFR BLD AUTO: 24 %
MCH RBC QN AUTO: 29 PG (ref 26.5–33)
MCHC RBC AUTO-ENTMCNC: 30.8 G/DL (ref 31.5–36.5)
MCV RBC AUTO: 94 FL (ref 78–100)
MONOCYTES # BLD AUTO: 1.1 10E3/UL (ref 0–1.3)
MONOCYTES NFR BLD AUTO: 6 %
NEUTROPHILS # BLD AUTO: 12.5 10E3/UL (ref 1.6–8.3)
NEUTROPHILS NFR BLD AUTO: 67 %
PLATELET # BLD AUTO: 414 10E3/UL (ref 150–450)
RBC # BLD AUTO: 4.24 10E6/UL (ref 3.8–5.2)
WBC # BLD AUTO: 18.6 10E3/UL (ref 4–11)

## 2023-12-21 PROCEDURE — 85025 COMPLETE CBC W/AUTO DIFF WBC: CPT

## 2023-12-21 PROCEDURE — 99207 PR NO CHARGE LOS: CPT | Performed by: NURSE PRACTITIONER

## 2023-12-21 PROCEDURE — 36415 COLL VENOUS BLD VENIPUNCTURE: CPT

## 2023-12-21 NOTE — PROGRESS NOTES
I called patient at her scheduled time after rooming was completed. Patient states she does not need this appointment any longer since her psychiatrist started on Belsomra and she is sleeping well. I updated patient's medication list     VERONIQUE Spaers CNP

## 2023-12-26 ENCOUNTER — MYC MEDICAL ADVICE (OUTPATIENT)
Dept: FAMILY MEDICINE | Facility: CLINIC | Age: 38
End: 2023-12-26
Payer: COMMERCIAL

## 2023-12-28 NOTE — TELEPHONE ENCOUNTER
She needs to be reassessed by a provider in person so the most appropriate treatment can be recommended.  She may be scheduled with any available provider today as she said she goes out of town tomorrow. My schedule is currently full and I am not in clinic tomorrow.  If pain is severe and causing her to have impaired walking, or if there is no clinic appointment available today, she should seek more immediate care at urgent care today.

## 2023-12-29 DIAGNOSIS — K21.9 GASTROESOPHAGEAL REFLUX DISEASE WITHOUT ESOPHAGITIS: ICD-10-CM

## 2024-01-08 DIAGNOSIS — L65.9 HAIR LOSS: ICD-10-CM

## 2024-01-08 RX ORDER — BIOTIN 5 MG
1 TABLET ORAL DAILY
Qty: 30 TABLET | Refills: 0 | Status: SHIPPED | OUTPATIENT
Start: 2024-01-08 | End: 2024-02-05

## 2024-01-10 ENCOUNTER — OFFICE VISIT (OUTPATIENT)
Dept: PODIATRY | Facility: CLINIC | Age: 39
End: 2024-01-10
Payer: COMMERCIAL

## 2024-01-10 VITALS
BODY MASS INDEX: 36.16 KG/M2 | HEART RATE: 74 BPM | WEIGHT: 225 LBS | HEIGHT: 66 IN | SYSTOLIC BLOOD PRESSURE: 140 MMHG | DIASTOLIC BLOOD PRESSURE: 79 MMHG

## 2024-01-10 DIAGNOSIS — M76.72 PERONEAL TENDINITIS OF LEFT LOWER LEG: ICD-10-CM

## 2024-01-10 DIAGNOSIS — M77.8 EXTENSOR TENDONITIS OF FOOT: Primary | ICD-10-CM

## 2024-01-10 PROCEDURE — 99213 OFFICE O/P EST LOW 20 MIN: CPT | Performed by: PODIATRIST

## 2024-01-10 RX ORDER — MELOXICAM 7.5 MG/1
7.5 TABLET ORAL DAILY
Qty: 30 TABLET | Refills: 1 | Status: SHIPPED | OUTPATIENT
Start: 2024-01-10 | End: 2024-02-05

## 2024-01-10 ASSESSMENT — PAIN SCALES - GENERAL: PAINLEVEL: EXTREME PAIN (8)

## 2024-01-10 NOTE — NURSING NOTE
"Chief Complaint   Patient presents with    Consult     Left foot and ankle injury- [place]        Initial BP (!) 140/79   Pulse 74   Ht 1.664 m (5' 5.5\")   Wt 102.1 kg (225 lb)   BMI 36.87 kg/m   Estimated body mass index is 36.87 kg/m  as calculated from the following:    Height as of this encounter: 1.664 m (5' 5.5\").    Weight as of this encounter: 102.1 kg (225 lb).  Medications and allergies reviewed.      Pamela COOK MA    "

## 2024-01-10 NOTE — PROGRESS NOTES
PATIENT HISTORY:  Divina Delgadillo is a 38 year old female who presents to clinic from an ED in [place]  with a chief complaint of left foot and ankle pain.  The patient is seen by themselves.  The patient relates the pain is primarily located around the bilateral side of ankle into the top.  Reports insidious onset without acute precipitating event.  The patient relates that the symptoms have been going on for several day(s).  The patient has previously tried a boot with little relief.  The patient is currently employed as dietary aide .  Any previous notes and studies that pertain to the patient's condition were reviewed.    Pertinent medical, surgical and family history was reviewed in the Epic chart.    Past Medical History:   Past Medical History:   Diagnosis Date    Acquired asplenia     Acute pain of left knee 03/22/2019    Acute-on-chronic kidney injury  (H24) 03/22/2019    ARF (acute renal failure) (H24) 03/23/2019    Asthma     Bipolar disorder (H)     Cardiac murmur     Cervical high risk HPV (human papillomavirus) test positive 06/20/2017    Chiari malformation type I (H)     Chronic bilateral low back pain without sciatica     Deep venous thrombosis of arm (H) 01/06/2017    ultrasound 1/6/2017-  venous thrombus in the antecubital fossa region and the left forearm as described    Depressive disorder     DVT (deep venous thrombosis) (H)     DVT of upper extremity (deep vein thrombosis) (H) 2021    Esophageal reflux     GERD    Hypertension     Insomnia     Leukocytosis     Mild intermittent asthma     Morbid obesity (H)     Mucinous neoplasm of pancreas     NAFL (nonalcoholic fatty liver)     Neck pain     Nicotine abuse     Other specified types of schizophrenia, unspecified condition     Schizoaffective disorder, depressive type (H)     Stage 3a chronic kidney disease (CKD) (H)     Type 2 diabetes mellitus (H)     Ulnar neuropathy of both upper extremities     Vitamin D insufficiency         Medications:   Current Outpatient Medications:     ACCU-CHEK GUIDE test strip, Use to test blood sugar 3 times daily or as directed., Disp: 150 strip, Rfl: 1    albuterol (VENTOLIN HFA) 108 (90 Base) MCG/ACT inhaler, INHALE 2 PUFFS INTO THE LUNGS EVERY SIX  HOURS AS NEEDED FOR SHORTNESS OF BREATH, Disp: 18 g, Rfl: 10    ARIPiprazole (ABILIFY) 30 MG tablet, Take 30 mg by mouth daily, Disp: , Rfl:     atorvastatin (LIPITOR) 10 MG tablet, Take 1 tablet (10 mg) by mouth daily, Disp: 90 tablet, Rfl: 2    Biotin 5 MG TABS, Take 1 tablet by mouth daily, Disp: 30 tablet, Rfl: 0    Biotin 5000 MCG CAPS, Take 1 tablet by mouth daily at 2 pm, Disp: , Rfl:     blood glucose (NO BRAND SPECIFIED) test strip, Use to test blood sugar 4 times daily or as directed., Disp: 100 strip, Rfl: 3    blood glucose (ONETOUCH VERIO IQ) test strip, Use to test blood sugar up to 3 times daily, Disp: 100 strip, Rfl: 11    Blood Glucose Monitoring Suppl (ONETOUCH VERIO FLEX SYSTEM) w/Device KIT, 1 each daily, Disp: 1 kit, Rfl: 0    bumetanide (BUMEX) 0.5 MG tablet, Take 1 tablet (0.5 mg) by mouth daily, Disp: 30 tablet, Rfl: 2    carvedilol (COREG) 12.5 MG tablet, Take 1 tablet (12.5 mg) by mouth 2 times daily, Disp: 60 tablet, Rfl: 11    cloZAPine (CLOZARIL) 100 MG tablet, Take 100 mg by mouth One in the morning, 2 at night, Disp: , Rfl:     cloZAPine (CLOZARIL) 50 MG tablet, 50 mg daily, Disp: , Rfl:     COMPOUNDED NON-CONTROLLED SUBSTANCE (CMPD RX) - PHARMACY TO MIX COMPOUNDED MEDICATION, Chloramphenicol 50 mg, sulfamethoxazole 50 mg, amphotericin B 5 mg, hydrocortisone 1 mg. 2-3 puffs to affected ear PRN itching/drainage, Disp: 10 capsule, Rfl: 1    Continuous Blood Gluc Sensor (DEXCOM G6 SENSOR) MISC, 1 each every 10 days. Change every 10 days. USE TO READ BLOOD SUGARS PER  INSTRUCTIONS, Disp: 12 each, Rfl: 1    Continuous Blood Gluc Sensor (DEXCOM G7 SENSOR) MISC, 1 Device every 10 days, Disp: 3 each, Rfl: 5    Continuous  "Blood Gluc Sensor (GUARDIAN 4 GLUCOSE SENSOR) MISC, 1 each every 7 days Change sensor every 7 days, Disp: 15 each, Rfl: 4    Continuous Blood Gluc Transmit (DEXCOM G6 TRANSMITTER) MISC, 1 each every 3 months Change every 3 months. USE TO READ BLOOD SUGARS PER  INSTRUCTIONS, Disp: 1 each, Rfl: 1    cyclobenzaprine (FLEXERIL) 5 MG tablet, Take 1 tablet (5 mg) by mouth 3 times daily as needed for muscle spasms, Disp: 20 tablet, Rfl: 0    fluocinolone acetonide 0.01 % OIL, Place 4 drops in ear(s) 2 times daily as needed (ear itching), Disp: 20 mL, Rfl: 3    FLUoxetine (PROZAC) 40 MG capsule, Take 40 mg by mouth daily, Disp: , Rfl:     glucose (BD GLUCOSE) 4 g chewable tablet, Take 1 tablet by mouth every hour as needed for low blood sugar, Disp: 30 tablet, Rfl: 4    hydrOXYzine (ATARAX) 25 MG tablet, Take 1 Tablet (25 mg) by mouth 3 times daily if needed for Anxiety., Disp: , Rfl:     Insulin Aspart FlexPen 100 UNIT/ML SOPN, Inject 1-14 Units Subcutaneous 3 times daily (before meals) Inject 3 times daily with meals as instructed.  Max TDD 30., Disp: 30 mL, Rfl: 4    insulin glargine (LANTUS SOLOSTAR) 100 UNIT/ML pen, Inject 32 Units Subcutaneous every morning, Disp: 45 mL, Rfl: 4    Insulin Infusion Pump (MINIMED 780G INSULIN PUMP) KIT, 1 each daily SmartGuard Target: 100; Active Insulin Time: 2 hours (recommended); SmartGuardTM Warmup/Manual Mode: Suspend before low (recommended), Disp: 1 kit, Rfl: 0    Insulin Infusion Pump Supplies (EXTENDED INFUSION SET 23\"/6MM) MISC, 1 each every 7 days Max Total Daily Dose 70 units; Change infusion set & reservoir every 7 days (recommended), Disp: 10 each, Rfl: 6    Insulin Infusion Pump Supplies (EXTENDED RESERVOIR 3ML) MISC, 1 each every 7 days, Disp: 10 each, Rfl: 11    insulin pen needle (32G X 6 MM) 32G X 6 MM miscellaneous, Use 4 pen needles daily., Disp: 150 each, Rfl: 10    insulin pen needle (ULTICARE MINI) 31G X 6 MM miscellaneous, USE 1 PEN NEEDLE 4 times " "daily, Disp: 400 each, Rfl: 11    lamoTRIgine (LAMICTAL) 100 MG tablet, Take 100 mg by mouth At Bedtime, Disp: , Rfl:     loratadine (CLARITIN) 10 MG tablet, Take 1 tablet (10 mg) by mouth every morning, Disp: 28 tablet, Rfl: 10    meloxicam (MOBIC) 7.5 MG tablet, Take 1 tablet (7.5 mg) by mouth daily, Disp: 30 tablet, Rfl: 1    omeprazole (PRILOSEC) 20 MG DR capsule, TAKE 1 CAPSULE BY MOUTH EVERY DAY, Disp: 90 capsule, Rfl: 0    OneTouch Delica Lancets 33G MISC, 1 each 4 times daily, Disp: 100 each, Rfl: 11    QUEtiapine (SEROQUEL) 25 MG tablet, Take 25 mg by mouth 1 tablet 4 times daily as needed for agitation or hallucinations, Disp: , Rfl:     Suvorexant (BELSOMRA) 5 MG tablet, Take 1 tablet (5 mg) by mouth nightly as needed for sleep, Disp: , Rfl:     topiramate (TOPAMAX) 50 MG tablet, Take 1 tablet (50 mg) by mouth 2 times daily, Disp: 60 tablet, Rfl: 3    acetaminophen (TYLENOL) 325 MG tablet, Take 325-650 mg by mouth 2 tab every 4-6 hours as needed, do not exceed 9 tabs in 24hours (Patient not taking: Reported on 1/10/2024), Disp: , Rfl:     loperamide (IMODIUM A-D) 2 MG tablet, 2 caplets after loose stool then 1 after each loose stool do not exceed 4 in 24hrs (Patient not taking: Reported on 12/21/2023), Disp: , Rfl:     traZODone (DESYREL) 50 MG tablet, 50 mg as needed (Patient not taking: Reported on 12/21/2023), Disp: , Rfl:      Allergies:    Allergies   Allergen Reactions    Acetaminophen Other (See Comments)     pt previously tried to overdose on it, PMD does not want pt taking per pt.     Latex Rash       Vitals: BP (!) 140/79   Pulse 74   Ht 1.664 m (5' 5.5\")   Wt 102.1 kg (225 lb)   BMI 36.87 kg/m    BMI= Body mass index is 36.87 kg/m .    LOWER EXTREMITY PHYSICAL EXAM    Dermatologic: Skin is intact to left lower extremity without significant lesions, rash or abrasion.        Vascular: DP & PT pulses are intact & regular on the left.   CFT and skin temperature is normal to the left lower " extremity.     Neurologic: Lower extremity sensation is intact to light touch.  No evidence of weakness in the left lower extremity.        Musculoskeletal: Patient is ambulatory without assistive device or brace.  No gross ankle deformity noted.  No foot or ankle joint effusion is noted.  Noted pain on palpation over the peroneal tendons and extensor tendons on the left lower extremities.      Diagnostics:  Radiographs included three views of the left foot and ankle demonstrating  no cortical erosions or periosteal elevation.  All joint margins appear stable.  There is no apparent fracture or tumor formation noted.  There is no evidence of foreign body.  The images were independently reviewed by myself along with the patient explaining the findings.      ASSESSMENT / PLAN:     ICD-10-CM    1. Extensor tendonitis of foot  M77.8 meloxicam (MOBIC) 7.5 MG tablet     Physical Therapy Referral      2. Peroneal tendinitis of left lower leg  M76.72 meloxicam (MOBIC) 7.5 MG tablet     Physical Therapy Referral          I have explained to Divina about the conditions.  We discussed the underlying contributing factors to the condition as well as both conservative and surgical treatment options along with expected length of recovery.  At this time, the patient was educated on the importance of offloading supportive shoes and other devices.  I demonstrated to the patient calf stretches to perform every hour daily until symptoms resolve.  After symptoms resolve, the patient was advised to perform the stretches 3 times daily to prevent future recurrence.  The patient was instructed to perform warm soaks with Epson salt after which to also apply over-the-counter Voltaren gel to deeply massage the injured tissue.  The patient was instructed to do this on a daily basis until symptoms resolve.  The patient was referred to physical therapy for left ankle rehabilitation. To reduce the amount of current inflammation, the patient was  prescribed Mobic 7.5 mg to be taken daily with food and instructed to stop taking if any stomach irritation or swelling in extremities are noted.  The patient may return in four weeks for reevaluation to determine if any further treatment will be needed.      Divina verbalized agreement with and understanding of the rational for the diagnosis and treatment plan.  All questions were answered to best of my ability and the patient's satisfaction. The patient was advised to contact the clinic with any questions that may arise after the clinic visit.      Disclaimer: This note consists of symbols derived from keyboarding, dictation and/or voice recognition software. As a result, there may be errors in the script that have gone undetected. Please consider this when interpreting information found in this chart.       ERNA Douglas D.P.M., F.ANGELES.C.F.A.S.

## 2024-01-10 NOTE — LETTER
1/10/2024         RE: Divina Delgadillo  [address] [facility] [address]        Dear Colleague,    Thank you for referring your patient, Divina Delgadillo, to the [facility] [place]. Please see a copy of my visit note below.    PATIENT HISTORY:  Divina Delgadillo is a 38 year old female who presents to clinic from an ED in [place]  with a chief complaint of left foot and ankle pain.  The patient is seen by themselves.  The patient relates the pain is primarily located around the bilateral side of ankle into the top.  Reports insidious onset without acute precipitating event.  The patient relates that the symptoms have been going on for several day(s).  The patient has previously tried a boot with little relief.  The patient is currently employed as dietary aide .  Any previous notes and studies that pertain to the patient's condition were reviewed.    Pertinent medical, surgical and family history was reviewed in the Epic chart.    Past Medical History:   Past Medical History:   Diagnosis Date     Acquired asplenia      Acute pain of left knee 03/22/2019     Acute-on-chronic kidney injury  (H24) 03/22/2019     ARF (acute renal failure) (H24) 03/23/2019     Asthma      Bipolar disorder (H)      Cardiac murmur      Cervical high risk HPV (human papillomavirus) test positive 06/20/2017     Chiari malformation type I (H)      Chronic bilateral low back pain without sciatica      Deep venous thrombosis of arm (H) 01/06/2017    ultrasound 1/6/2017-  venous thrombus in the antecubital fossa region and the left forearm as described     Depressive disorder      DVT (deep venous thrombosis) (H)      DVT of upper extremity (deep vein thrombosis) (H) 2021     Esophageal reflux     GERD     Hypertension      Insomnia      Leukocytosis      Mild intermittent asthma      Morbid obesity (H)      Mucinous neoplasm of pancreas      NAFL (nonalcoholic fatty liver)      Neck pain      Nicotine  abuse      Other specified types of schizophrenia, unspecified condition      Schizoaffective disorder, depressive type (H)      Stage 3a chronic kidney disease (CKD) (H)      Type 2 diabetes mellitus (H)      Ulnar neuropathy of both upper extremities      Vitamin D insufficiency        Medications:   Current Outpatient Medications:      ACCU-CHEK GUIDE test strip, Use to test blood sugar 3 times daily or as directed., Disp: 150 strip, Rfl: 1     albuterol (VENTOLIN HFA) 108 (90 Base) MCG/ACT inhaler, INHALE 2 PUFFS INTO THE LUNGS EVERY SIX  HOURS AS NEEDED FOR SHORTNESS OF BREATH, Disp: 18 g, Rfl: 10     ARIPiprazole (ABILIFY) 30 MG tablet, Take 30 mg by mouth daily, Disp: , Rfl:      atorvastatin (LIPITOR) 10 MG tablet, Take 1 tablet (10 mg) by mouth daily, Disp: 90 tablet, Rfl: 2     Biotin 5 MG TABS, Take 1 tablet by mouth daily, Disp: 30 tablet, Rfl: 0     Biotin 5000 MCG CAPS, Take 1 tablet by mouth daily at 2 pm, Disp: , Rfl:      blood glucose (NO BRAND SPECIFIED) test strip, Use to test blood sugar 4 times daily or as directed., Disp: 100 strip, Rfl: 3     blood glucose (ONETOUCH VERIO IQ) test strip, Use to test blood sugar up to 3 times daily, Disp: 100 strip, Rfl: 11     Blood Glucose Monitoring Suppl (ONETOUCH VERIO FLEX SYSTEM) w/Device KIT, 1 each daily, Disp: 1 kit, Rfl: 0     bumetanide (BUMEX) 0.5 MG tablet, Take 1 tablet (0.5 mg) by mouth daily, Disp: 30 tablet, Rfl: 2     carvedilol (COREG) 12.5 MG tablet, Take 1 tablet (12.5 mg) by mouth 2 times daily, Disp: 60 tablet, Rfl: 11     cloZAPine (CLOZARIL) 100 MG tablet, Take 100 mg by mouth One in the morning, 2 at night, Disp: , Rfl:      cloZAPine (CLOZARIL) 50 MG tablet, 50 mg daily, Disp: , Rfl:      COMPOUNDED NON-CONTROLLED SUBSTANCE (CMPD RX) - PHARMACY TO MIX COMPOUNDED MEDICATION, Chloramphenicol 50 mg, sulfamethoxazole 50 mg, amphotericin B 5 mg, hydrocortisone 1 mg. 2-3 puffs to affected ear PRN itching/drainage, Disp: 10 capsule, Rfl:  "1     Continuous Blood Gluc Sensor (DEXCOM G6 SENSOR) MISC, 1 each every 10 days. Change every 10 days. USE TO READ BLOOD SUGARS PER  INSTRUCTIONS, Disp: 12 each, Rfl: 1     Continuous Blood Gluc Sensor (DEXCOM G7 SENSOR) MISC, 1 Device every 10 days, Disp: 3 each, Rfl: 5     Continuous Blood Gluc Sensor (GUARDIAN 4 GLUCOSE SENSOR) MISC, 1 each every 7 days Change sensor every 7 days, Disp: 15 each, Rfl: 4     Continuous Blood Gluc Transmit (DEXCOM G6 TRANSMITTER) MISC, 1 each every 3 months Change every 3 months. USE TO READ BLOOD SUGARS PER  INSTRUCTIONS, Disp: 1 each, Rfl: 1     cyclobenzaprine (FLEXERIL) 5 MG tablet, Take 1 tablet (5 mg) by mouth 3 times daily as needed for muscle spasms, Disp: 20 tablet, Rfl: 0     fluocinolone acetonide 0.01 % OIL, Place 4 drops in ear(s) 2 times daily as needed (ear itching), Disp: 20 mL, Rfl: 3     FLUoxetine (PROZAC) 40 MG capsule, Take 40 mg by mouth daily, Disp: , Rfl:      glucose (BD GLUCOSE) 4 g chewable tablet, Take 1 tablet by mouth every hour as needed for low blood sugar, Disp: 30 tablet, Rfl: 4     hydrOXYzine (ATARAX) 25 MG tablet, Take 1 Tablet (25 mg) by mouth 3 times daily if needed for Anxiety., Disp: , Rfl:      Insulin Aspart FlexPen 100 UNIT/ML SOPN, Inject 1-14 Units Subcutaneous 3 times daily (before meals) Inject 3 times daily with meals as instructed.  Max TDD 30., Disp: 30 mL, Rfl: 4     insulin glargine (LANTUS SOLOSTAR) 100 UNIT/ML pen, Inject 32 Units Subcutaneous every morning, Disp: 45 mL, Rfl: 4     Insulin Infusion Pump (MINIMED 780G INSULIN PUMP) KIT, 1 each daily SmartGuard Target: 100; Active Insulin Time: 2 hours (recommended); SmartGuardTM Warmup/Manual Mode: Suspend before low (recommended), Disp: 1 kit, Rfl: 0     Insulin Infusion Pump Supplies (EXTENDED INFUSION SET 23\"/6MM) MISC, 1 each every 7 days Max Total Daily Dose 70 units; Change infusion set & reservoir every 7 days (recommended), Disp: 10 each, Rfl: " "6     Insulin Infusion Pump Supplies (EXTENDED RESERVOIR 3ML) MISC, 1 each every 7 days, Disp: 10 each, Rfl: 11     insulin pen needle (32G X 6 MM) 32G X 6 MM miscellaneous, Use 4 pen needles daily., Disp: 150 each, Rfl: 10     insulin pen needle (ULTICARE MINI) 31G X 6 MM miscellaneous, USE 1 PEN NEEDLE 4 times daily, Disp: 400 each, Rfl: 11     lamoTRIgine (LAMICTAL) 100 MG tablet, Take 100 mg by mouth At Bedtime, Disp: , Rfl:      loratadine (CLARITIN) 10 MG tablet, Take 1 tablet (10 mg) by mouth every morning, Disp: 28 tablet, Rfl: 10     meloxicam (MOBIC) 7.5 MG tablet, Take 1 tablet (7.5 mg) by mouth daily, Disp: 30 tablet, Rfl: 1     omeprazole (PRILOSEC) 20 MG DR capsule, TAKE 1 CAPSULE BY MOUTH EVERY DAY, Disp: 90 capsule, Rfl: 0     OneTouch Delica Lancets 33G MISC, 1 each 4 times daily, Disp: 100 each, Rfl: 11     QUEtiapine (SEROQUEL) 25 MG tablet, Take 25 mg by mouth 1 tablet 4 times daily as needed for agitation or hallucinations, Disp: , Rfl:      Suvorexant (BELSOMRA) 5 MG tablet, Take 1 tablet (5 mg) by mouth nightly as needed for sleep, Disp: , Rfl:      topiramate (TOPAMAX) 50 MG tablet, Take 1 tablet (50 mg) by mouth 2 times daily, Disp: 60 tablet, Rfl: 3     acetaminophen (TYLENOL) 325 MG tablet, Take 325-650 mg by mouth 2 tab every 4-6 hours as needed, do not exceed 9 tabs in 24hours (Patient not taking: Reported on 1/10/2024), Disp: , Rfl:      loperamide (IMODIUM A-D) 2 MG tablet, 2 caplets after loose stool then 1 after each loose stool do not exceed 4 in 24hrs (Patient not taking: Reported on 12/21/2023), Disp: , Rfl:      traZODone (DESYREL) 50 MG tablet, 50 mg as needed (Patient not taking: Reported on 12/21/2023), Disp: , Rfl:      Allergies:    Allergies   Allergen Reactions     Acetaminophen Other (See Comments)     pt previously tried to overdose on it, PMD does not want pt taking per pt.      Latex Rash       Vitals: BP (!) 140/79   Pulse 74   Ht 1.664 m (5' 5.5\")   Wt 102.1 kg " (225 lb)   BMI 36.87 kg/m    BMI= Body mass index is 36.87 kg/m .    LOWER EXTREMITY PHYSICAL EXAM    Dermatologic: Skin is intact to left lower extremity without significant lesions, rash or abrasion.        Vascular: DP & PT pulses are intact & regular on the left.   CFT and skin temperature is normal to the left lower extremity.     Neurologic: Lower extremity sensation is intact to light touch.  No evidence of weakness in the left lower extremity.        Musculoskeletal: Patient is ambulatory without assistive device or brace.  No gross ankle deformity noted.  No foot or ankle joint effusion is noted.  Noted pain on palpation over the peroneal tendons and extensor tendons on the left lower extremities.      Diagnostics:  Radiographs included three views of the left foot and ankle demonstrating  no cortical erosions or periosteal elevation.  All joint margins appear stable.  There is no apparent fracture or tumor formation noted.  There is no evidence of foreign body.  The images were independently reviewed by myself along with the patient explaining the findings.      ASSESSMENT / PLAN:     ICD-10-CM    1. Extensor tendonitis of foot  M77.8 meloxicam (MOBIC) 7.5 MG tablet     Physical Therapy Referral      2. Peroneal tendinitis of left lower leg  M76.72 meloxicam (MOBIC) 7.5 MG tablet     Physical Therapy Referral          I have explained to Divina about the conditions.  We discussed the underlying contributing factors to the condition as well as both conservative and surgical treatment options along with expected length of recovery.  At this time, the patient was educated on the importance of offloading supportive shoes and other devices.  I demonstrated to the patient calf stretches to perform every hour daily until symptoms resolve.  After symptoms resolve, the patient was advised to perform the stretches 3 times daily to prevent future recurrence.  The patient was instructed to perform warm soaks with  Epson salt after which to also apply over-the-counter Voltaren gel to deeply massage the injured tissue.  The patient was instructed to do this on a daily basis until symptoms resolve.  The patient was referred to physical therapy for left ankle rehabilitation. To reduce the amount of current inflammation, the patient was prescribed Mobic 7.5 mg to be taken daily with food and instructed to stop taking if any stomach irritation or swelling in extremities are noted.  The patient may return in four weeks for reevaluation to determine if any further treatment will be needed.      Divina verbalized agreement with and understanding of the rational for the diagnosis and treatment plan.  All questions were answered to best of my ability and the patient's satisfaction. The patient was advised to contact the clinic with any questions that may arise after the clinic visit.      Disclaimer: This note consists of symbols derived from keyboarding, dictation and/or voice recognition software. As a result, there may be errors in the script that have gone undetected. Please consider this when interpreting information found in this chart.       JOYCE Hinson.PANIYAH., F.A.C.F.A.S.      Again, thank you for allowing me to participate in the care of your patient.        Sincerely,        Raul Douglas DPM

## 2024-01-10 NOTE — PATIENT INSTRUCTIONS

## 2024-01-11 ENCOUNTER — HOSPITAL ENCOUNTER (OUTPATIENT)
Dept: CARDIOLOGY | Facility: CLINIC | Age: 39
Discharge: HOME | End: 2024-01-11
Attending: INTERNAL MEDICINE | Admitting: INTERNAL MEDICINE
Payer: COMMERCIAL

## 2024-01-11 DIAGNOSIS — R07.89 OTHER CHEST PAIN: ICD-10-CM

## 2024-01-11 DIAGNOSIS — R55 SYNCOPE, UNSPECIFIED SYNCOPE TYPE: ICD-10-CM

## 2024-01-11 PROCEDURE — 93246 EXT ECG>7D<15D RECORDING: CPT

## 2024-01-11 PROCEDURE — 93248 EXT ECG>7D<15D REV&INTERPJ: CPT | Performed by: INTERNAL MEDICINE

## 2024-01-21 ENCOUNTER — TRANSFERRED RECORDS (OUTPATIENT)
Dept: HEALTH INFORMATION MANAGEMENT | Facility: CLINIC | Age: 39
End: 2024-01-21
Payer: COMMERCIAL

## 2024-01-22 ENCOUNTER — TRANSFERRED RECORDS (OUTPATIENT)
Dept: HEALTH INFORMATION MANAGEMENT | Facility: CLINIC | Age: 39
End: 2024-01-22
Payer: COMMERCIAL

## 2024-01-24 ENCOUNTER — MYC MEDICAL ADVICE (OUTPATIENT)
Dept: FAMILY MEDICINE | Facility: CLINIC | Age: 39
End: 2024-01-24

## 2024-01-24 DIAGNOSIS — S72.92XA CLOSED FRACTURE OF LEFT FEMUR, UNSPECIFIED FRACTURE MORPHOLOGY, UNSPECIFIED PORTION OF FEMUR, INITIAL ENCOUNTER (H): Primary | ICD-10-CM

## 2024-01-25 NOTE — TELEPHONE ENCOUNTER
I recommend to schedule hospital follow up with me, as well ortho, I signed referral for patient    VERONIQUE Spears CNP

## 2024-01-25 NOTE — TELEPHONE ENCOUNTER
I spoke with pt directly.  She is currently in a [place], ED being treated for a left femur fracture.    Pt anticipates being back in [place] in the next 1-2 days.  Advised pt to take pictures of her discharge instructions from the [place] hospital and forward to PCP once she has been discharged.    Follow up to be determined after pt's hospital discharge.    Odalys Mattson RN

## 2024-01-26 DIAGNOSIS — G43.009 MIGRAINE WITHOUT AURA AND WITHOUT STATUS MIGRAINOSUS, NOT INTRACTABLE: ICD-10-CM

## 2024-01-26 DIAGNOSIS — G43.719 INTRACTABLE CHRONIC MIGRAINE WITHOUT AURA AND WITHOUT STATUS MIGRAINOSUS: ICD-10-CM

## 2024-01-26 RX ORDER — TOPIRAMATE 50 MG/1
50 TABLET, FILM COATED ORAL 2 TIMES DAILY
Qty: 60 TABLET | Refills: 3 | Status: SHIPPED | OUTPATIENT
Start: 2024-01-26 | End: 2024-04-16

## 2024-02-01 ENCOUNTER — TELEPHONE (OUTPATIENT)
Dept: FAMILY MEDICINE | Facility: CLINIC | Age: 39
End: 2024-02-01

## 2024-02-01 ENCOUNTER — TRANSFERRED RECORDS (OUTPATIENT)
Dept: HEALTH INFORMATION MANAGEMENT | Facility: CLINIC | Age: 39
End: 2024-02-01
Payer: COMMERCIAL

## 2024-02-01 ENCOUNTER — TELEPHONE (OUTPATIENT)
Dept: SURGERY | Facility: CLINIC | Age: 39
End: 2024-02-01
Payer: COMMERCIAL

## 2024-02-01 NOTE — TELEPHONE ENCOUNTER
matthew bowers, [employer] (GUARDIAN; SEE ACP TAB/SCANNED LIST FOR AUTHORIZED STAFF)  called [place]. She is back [place] [facility] [place] Looking for referral to ortho. She needs appt in two weeks. 1/21 was dos.     Called matthew back and sent her scheduling referral placed on 01/24/24.    Juanita CACERES  Station

## 2024-02-01 NOTE — TELEPHONE ENCOUNTER
Hello,    I'm with ortho con.  Pt had a femur fracture in [place] and needs a f/u appointment. Pt is under the care of [employer] for guardianship.  Please call , Monie Carlos, court appointed guardian.  Images were taken at [facility].  Pt had a femur fracture that was surgically repaired near her hip.  A pin was put in.    Pt is to be seen in 2 weeks.  Could Dr. Nino see this pt?  From what I know [facility] does not see pts who had another surgeon within the year of the surgery so [place] will not be an option.

## 2024-02-02 ENCOUNTER — DOCUMENTATION ONLY (OUTPATIENT)
Dept: GERIATRICS | Facility: CLINIC | Age: 39
End: 2024-02-02
Payer: COMMERCIAL

## 2024-02-02 NOTE — TELEPHONE ENCOUNTER
Okay to be seen per David, scheduled with Monie. left subtrochanteric femur fracture s/p GLF- notes are available through [facility] under [facility].  BEATA Ramires RN 2/2/2024 10:54 AM

## 2024-02-03 ENCOUNTER — TELEPHONE (OUTPATIENT)
Dept: GERIATRICS | Facility: CLINIC | Age: 39
End: 2024-02-03
Payer: COMMERCIAL

## 2024-02-03 DIAGNOSIS — S72.25XD CLOSED NONDISPLACED SUBTROCHANTERIC FRACTURE OF LEFT FEMUR WITH ROUTINE HEALING, SUBSEQUENT ENCOUNTER: Primary | ICD-10-CM

## 2024-02-03 RX ORDER — OXYCODONE HYDROCHLORIDE 5 MG/1
5 TABLET ORAL EVERY 4 HOURS
Qty: 60 TABLET | Refills: 0 | Status: SHIPPED | OUTPATIENT
Start: 2024-02-03 | End: 2024-02-14

## 2024-02-03 NOTE — TELEPHONE ENCOUNTER
New admit from [place] with a femur fracture. Came with a bottle of oxycodone with a few tabs, now out. Sig on bottle is 5 mg q4h PRN.     Ordered oxycodone 5 mg q4h PRN #60 to Zoya Mora MD

## 2024-02-05 ENCOUNTER — TRANSITIONAL CARE UNIT VISIT (OUTPATIENT)
Dept: GERIATRICS | Facility: CLINIC | Age: 39
End: 2024-02-05
Payer: COMMERCIAL

## 2024-02-05 VITALS
SYSTOLIC BLOOD PRESSURE: 129 MMHG | HEART RATE: 82 BPM | DIASTOLIC BLOOD PRESSURE: 57 MMHG | WEIGHT: 227.1 LBS | RESPIRATION RATE: 16 BRPM | HEIGHT: 62 IN | OXYGEN SATURATION: 95 % | TEMPERATURE: 98.1 F | BODY MASS INDEX: 41.79 KG/M2

## 2024-02-05 DIAGNOSIS — Z79.4 TYPE 2 DIABETES MELLITUS WITH DIABETIC POLYNEUROPATHY, WITH LONG-TERM CURRENT USE OF INSULIN (H): ICD-10-CM

## 2024-02-05 DIAGNOSIS — Z47.89 ORTHOPEDIC AFTERCARE: ICD-10-CM

## 2024-02-05 DIAGNOSIS — E11.42 TYPE 2 DIABETES MELLITUS WITH DIABETIC POLYNEUROPATHY, WITH LONG-TERM CURRENT USE OF INSULIN (H): ICD-10-CM

## 2024-02-05 DIAGNOSIS — S72.25XD CLOSED NONDISPLACED SUBTROCHANTERIC FRACTURE OF LEFT FEMUR WITH ROUTINE HEALING, SUBSEQUENT ENCOUNTER: Primary | ICD-10-CM

## 2024-02-05 DIAGNOSIS — F25.1 SCHIZOAFFECTIVE DISORDER, DEPRESSIVE TYPE (H): Chronic | ICD-10-CM

## 2024-02-05 DIAGNOSIS — F60.3 BORDERLINE PERSONALITY DISORDER (H): Chronic | ICD-10-CM

## 2024-02-05 DIAGNOSIS — Z90.81 ACQUIRED ASPLENIA: ICD-10-CM

## 2024-02-05 DIAGNOSIS — G47.00 INSOMNIA, UNSPECIFIED TYPE: ICD-10-CM

## 2024-02-05 DIAGNOSIS — D72.829 LEUKOCYTOSIS, UNSPECIFIED TYPE: ICD-10-CM

## 2024-02-05 PROCEDURE — 99309 SBSQ NF CARE MODERATE MDM 30: CPT | Performed by: NURSE PRACTITIONER

## 2024-02-05 NOTE — LETTER
2/5/2024        RE: Divina Delgadillo  [id number] [address] [facility] [place] [address]        [facility] TCU Admission  PCP & CLINIC: VERONIQUE Spears CNP, [address] / [place] [address]  Chief Complaint   Patient presents with     Hospital F/U   [place] MRN: [id number]. Place of Service where encounter took place:  [facility] (TCU) [[id number]] Divina Delgadillo  is a 38 year old  (1985), admitted to the above facility from   [facility] . Hospital stay 1/21 through 2/1/24. Admitted to this facility for  rehab, medical management, and nursing care. HPI information obtained from: facility chart records, facility staff, patient report, [facility] chart review, and Care Everywhere [facility] chart review.     Brief Summary of Hospital Course: Divina presented to [facility] on 1/21s/p fall at home. She was found to have a left femur fx. She underwent left fixaion of proximal femur and long antegrade nailng. Course complex w/ DM-I and out-of-state status.     Updates since admission to transitional care unit: Divina presented to TCU on 2/1 s/p the above hospitalization. Today, Divina feels things are going pretty well.  She is a little nervous about having lower extremity swelling, but says she is not having any unusual pain.  She is having mild to moderate pain mostly above her knee, but says she is able to perform in rehab, and the medications she has are helpful.  She has some ongoing bilateral lower extremity neuropathy.  She says her bowels and bladder are working well.  She has a good appetite, and is without nausea or heartburn.  She denies shortness of breath, headache, dizziness.    CODE STATUS/ADVANCE DIRECTIVES DISCUSSION: CPR/Full code. Patient's living condition: lives in an adult foster home. ALLERGIES: Acetaminophen and Latex PAST MEDICAL HISTORY:  has a past medical history of Acquired asplenia, Acute pain of left knee (03/22/2019), Acute-on-chronic kidney injury   (H24) (03/22/2019), ARF (acute renal failure) (H24) (03/23/2019), Asthma, Bipolar disorder (H), Cardiac murmur, Cervical high risk HPV (human papillomavirus) test positive (06/20/2017), Chiari malformation type I (H), Chronic bilateral low back pain without sciatica, Deep venous thrombosis of arm (H) (01/06/2017), Depressive disorder, DVT (deep venous thrombosis) (H), DVT of upper extremity (deep vein thrombosis) (H) (2021), Esophageal reflux, Hypertension, Insomnia, Leukocytosis, Mild intermittent asthma, Morbid obesity (H), Mucinous neoplasm of pancreas, NAFL (nonalcoholic fatty liver), Neck pain, Nicotine abuse, Other specified types of schizophrenia, unspecified condition, Schizoaffective disorder, depressive type (H), Stage 3a chronic kidney disease (CKD) (H), Type 2 diabetes mellitus (H), Ulnar neuropathy of both upper extremities, and Vitamin D insufficiency.    She has no past medical history of Arthritis, Cerebral infarction (H), Congestive heart failure (H), COPD (chronic obstructive pulmonary disease) (H), Heart disease, History of blood transfusion, or Thyroid disease.. PAST SURGICAL HISTORY:   has a past surgical history that includes ENT surgery (10/01/2000); tonsillectomy (10/01/2000); splenectomy (2015); Adrenalectomy (Left, 2015); Pancreatectomy partial (2015); Percutaneous biopsy liver (Right, 11/14/2019); IR Liver Biopsy Percutaneous (11/14/2019); biopsy; Breast surgery (2013); and colonoscopy.. FAMILY HISTORY: family history includes Allergies in her brother, mother, sister, and sister; Anxiety Disorder in her father and mother; Asthma in her mother; Chronic Obstructive Pulmonary Disease in her mother; Depression in her father, mother, sister, and sister; Diabetes in her father; Heart Disease in her father; Hypertension in her father; Mental Illness in her father and mother; Obesity in her father; Respiratory in her brother, mother, sister, and sister.. SOCIAL HISTORY:   reports that she has quit  smoking. Her smoking use included cigarettes. She started smoking about 2 months ago. She smoked an average of 0.5 packs per day. She has been exposed to tobacco smoke. She has never used smokeless tobacco. She reports that she does not drink alcohol and does not use drugs.  Post Discharge Medication Reconciliation Status: discharge medications reconciled and changed, per note/orders.  Current Outpatient Medications   Medication Sig Dispense Refill     acetaminophen (TYLENOL) 325 MG tablet Take 650 mg by mouth every 4 hours as needed for pain or fever 2 tab every 4-6 hours as needed, do not exceed 9 tabs in 24hours       traZODone (DESYREL) 50 MG tablet 50 mg as needed       ACCU-CHEK GUIDE test strip Use to test blood sugar 3 times daily or as directed. 150 strip 1     albuterol (VENTOLIN HFA) 108 (90 Base) MCG/ACT inhaler INHALE 2 PUFFS INTO THE LUNGS EVERY SIX  HOURS AS NEEDED FOR SHORTNESS OF BREATH 18 g 10     ARIPiprazole (ABILIFY) 30 MG tablet Take 30 mg by mouth daily       atorvastatin (LIPITOR) 10 MG tablet Take 1 tablet (10 mg) by mouth daily 90 tablet 2     bumetanide (BUMEX) 0.5 MG tablet Take 1 tablet (0.5 mg) by mouth daily 30 tablet 2     carvedilol (COREG) 12.5 MG tablet Take 1 tablet (12.5 mg) by mouth 2 times daily 60 tablet 11     cloZAPine (CLOZARIL) 100 MG tablet Take 100 mg by mouth One in the morning, 2 at night       cloZAPine (CLOZARIL) 50 MG tablet 50 mg daily       COMPOUNDED NON-CONTROLLED SUBSTANCE (CMPD RX) - PHARMACY TO MIX COMPOUNDED MEDICATION Chloramphenicol 50 mg, sulfamethoxazole 50 mg, amphotericin B 5 mg, hydrocortisone 1 mg. 2-3 puffs to affected ear PRN itching/drainage 10 capsule 1     Continuous Blood Gluc Sensor (DEXCOM G7 SENSOR) MISC 1 Device every 10 days 3 each 5     cyclobenzaprine (FLEXERIL) 5 MG tablet Take 1 tablet (5 mg) by mouth 3 times daily as needed for muscle spasms 20 tablet 0     fluocinolone acetonide 0.01 % OIL Place 4 drops in ear(s) 2 times daily as  "needed (ear itching) 20 mL 3     FLUoxetine (PROZAC) 40 MG capsule Take 40 mg by mouth daily       glucose (BD GLUCOSE) 4 g chewable tablet Take 1 tablet by mouth every hour as needed for low blood sugar 30 tablet 4     Insulin Aspart FlexPen 100 UNIT/ML SOPN Inject 1-14 Units Subcutaneous 3 times daily (before meals) Inject 3 times daily with meals as instructed.  Max TDD 30. 30 mL 4     insulin glargine (LANTUS SOLOSTAR) 100 UNIT/ML pen Inject 32 Units Subcutaneous every morning 45 mL 4     Insulin Infusion Pump (MINIMED 780G INSULIN PUMP) KIT 1 each daily SmartGuard Target: 100; Active Insulin Time: 2 hours (recommended); SmartGuardTM Warmup/Manual Mode: Suspend before low (recommended) 1 kit 0     Insulin Infusion Pump Supplies (EXTENDED INFUSION SET 23\"/6MM) MISC 1 each every 7 days Max Total Daily Dose 70 units; Change infusion set & reservoir every 7 days (recommended) 10 each 6     Insulin Infusion Pump Supplies (EXTENDED RESERVOIR 3ML) MISC 1 each every 7 days 10 each 11     insulin pen needle (32G X 6 MM) 32G X 6 MM miscellaneous Use 4 pen needles daily. 150 each 10     insulin pen needle (ULTICARE MINI) 31G X 6 MM miscellaneous USE 1 PEN NEEDLE 4 times daily 400 each 11     lamoTRIgine (LAMICTAL) 100 MG tablet Take 100 mg by mouth At Bedtime       omeprazole (PRILOSEC) 20 MG DR capsule TAKE 1 CAPSULE BY MOUTH EVERY DAY 90 capsule 0     oxyCODONE (ROXICODONE) 5 MG tablet Take 1 tablet (5 mg) by mouth every 4 hours 60 tablet 0     topiramate (TOPAMAX) 50 MG tablet Take 1 tablet (50 mg) by mouth 2 times daily 60 tablet 3     ROS: 4 point ROS including Respiratory, CV, GI and , other than that noted in the HPI,  is negative    Vitals: /57   Pulse 82   Temp 98.1  F (36.7  C)   Resp 16   Ht 1.575 m (5' 2\")   Wt 103 kg (227 lb 1.6 oz)   SpO2 95%   BMI 41.54 kg/m    BGs:          Exam:  GENERAL APPEARANCE: Alert, in no distress, cooperative.   ENT: Mouth/posterior oropharynx intact w/ moist mucous " membranes, hearing acuity WDL.  EYES: EOM, conjunctivae, lids, pupils and irises normal, PERRL2.   RESP: Respiratory effort good, no respiratory distress, Lung sounds clear. On RA.   CV: Auscultation of heart reveals S1, S2, rate and rhythm regular, 2/6 systolic murmur, no rub or gallop, Edema 1-2+ BLE. Peripheral pulses are 2+.  ABDOMEN: Normal bowel sounds, soft, non-tender abdomen, and no masses palpated.  SKIN: Inspection/Palpation of skin and subcutaneous tissue baseline w/ fragility. No wounds/rashes noted except incision which is covered during this visit.   NEURO: CN II-XII at patient's baseline, sensation baseline PPS.  PSYCH: Insight, judgement, and memory are baseline, affect and mood are happy/engaged.    Lab/Diagnostic data: Recent labs in [facility] reviewed by me today.     ASSESSMENT/PLAN:  Closed nondisplaced subtrochanteric fracture of left femur with routine healing, subsequent encounter  Orthopedic Aftercare  Acquired asplenia  Leukocytosis, unspecified type  Borderline personality disorder (H)  Schizoaffective disorder, depressive type (H)  Insomnia, unspecified type  Type 2 diabetes mellitus with diabetic polyneuropathy, with long-term current use of insulin (H)  Acute on chronic. Tenuous/Complex.  Provider reviewed records from hospitalization, facility, and interpreted most recent imaging/lab work, and vital signs.  Notable asplenia with leukocytosis.  Given she is postoperative, noting white count was 23 inpatient.  Will closely trend this.  Noting poorly controlled diabetes with last A1c greater than 11%.  Does see endocrinology.  Noting previous consideration of metformin, but patient had renal impairment at the time.  She may be a good candidate for this now.  Given her edema, will trend renal function and electrolytes.  May consider metformin start upon follow-up.  No GERD, will decrease PPI to lowest, most effective dose.  Noting significant psychiatric history and large bone  fracture/stressor.  Will consult in-house psychologist to help support.  Notable bilateral lower extremity edema with left slightly greater than right.  Patient is on DVT prophylaxis with Lovenox.  Will add Tensoshapes.  Encouraged Divina to elevate her lower extremities and apply ice to aid in pain management.  Given CMS regulations, will place 14-day parameter on trazodone as noted below. Noted initial PRN orders for non antipsychotic psychotropic medications are limited to 14 days. Start new psychotropic medication x 14 days for occasional insomnia.   Provider coordinated care with  and primary care team.  This patient [place] from out of state hospital, but will resume care at her primary care clinic upon discharge from TCU, which we anticipate disposition is 1 to 2 weeks in length.  Follow up w/in 1 week or as needed.    Orders:  Decrease Protonix to 20mg PO Qday. Dx: GERD.  ACP/in-house psych eval/tx x1, routine. Dx: schizophrenia.   CBC, BMP x1 on 2/7. Dx: leukocytosis, CKD.  Tensoshapes, knee high, on in AM, off at HS. Dx: Edema.   RENEW PRN Trazodone x 14 days. Dx: insomnia.     Electronically signed by:  Dr. Danisha Yee, APRN CNP DNP                        Sincerely,        Danisha Yee, VERONIQUE CNP

## 2024-02-05 NOTE — PROGRESS NOTES
[facility] TCU Admission  PCP & CLINIC: Jaleesa Velasquez, VERONIQUE CNP, [address] / [facility] [address]  Chief Complaint   Patient presents with    Hospital F/U   [place] MRN: [id number]. Place of Service where encounter took place:  [facility] (TCU) [4002] Divina Delgadillo  is a 38 year old  (1985), admitted to the above facility from   [facility] . Hospital stay 1/21 through 2/1/24. Admitted to this facility for  rehab, medical management, and nursing care. HPI information obtained from: facility chart records, facility staff, patient report, [facility] chart review, and Care Everywhere [facility] chart review.     Brief Summary of Hospital Course: Divina presented to [facility] on 1/21s/p fall at home. She was found to have a left femur fx. She underwent left fixaion of proximal femur and long antegrade nailng. Course complex w/ DM-I and out-of-state status.     Updates since admission to transitional care unit: Divina presented to TCU on 2/1 s/p the above hospitalization. Today, Divina feels things are going pretty well.  She is a little nervous about having lower extremity swelling, but says she is not having any unusual pain.  She is having mild to moderate pain mostly above her knee, but says she is able to perform in rehab, and the medications she has are helpful.  She has some ongoing bilateral lower extremity neuropathy.  She says her bowels and bladder are working well.  She has a good appetite, and is without nausea or heartburn.  She denies shortness of breath, headache, dizziness.    CODE STATUS/ADVANCE DIRECTIVES DISCUSSION: CPR/Full code. Patient's living condition: lives in an adult foster home. ALLERGIES: Acetaminophen and Latex PAST MEDICAL HISTORY:  has a past medical history of Acquired asplenia, Acute pain of left knee (03/22/2019), Acute-on-chronic kidney injury  (H24) (03/22/2019), ARF (acute renal failure) (H24) (03/23/2019), Asthma, Bipolar disorder  (H), Cardiac murmur, Cervical high risk HPV (human papillomavirus) test positive (06/20/2017), Chiari malformation type I (H), Chronic bilateral low back pain without sciatica, Deep venous thrombosis of arm (H) (01/06/2017), Depressive disorder, DVT (deep venous thrombosis) (H), DVT of upper extremity (deep vein thrombosis) (H) (2021), Esophageal reflux, Hypertension, Insomnia, Leukocytosis, Mild intermittent asthma, Morbid obesity (H), Mucinous neoplasm of pancreas, NAFL (nonalcoholic fatty liver), Neck pain, Nicotine abuse, Other specified types of schizophrenia, unspecified condition, Schizoaffective disorder, depressive type (H), Stage 3a chronic kidney disease (CKD) (H), Type 2 diabetes mellitus (H), Ulnar neuropathy of both upper extremities, and Vitamin D insufficiency.    She has no past medical history of Arthritis, Cerebral infarction (H), Congestive heart failure (H), COPD (chronic obstructive pulmonary disease) (H), Heart disease, History of blood transfusion, or Thyroid disease.. PAST SURGICAL HISTORY:   has a past surgical history that includes ENT surgery (10/01/2000); tonsillectomy (10/01/2000); splenectomy (2015); Adrenalectomy (Left, 2015); Pancreatectomy partial (2015); Percutaneous biopsy liver (Right, 11/14/2019); IR Liver Biopsy Percutaneous (11/14/2019); biopsy; Breast surgery (2013); and colonoscopy.. FAMILY HISTORY: family history includes Allergies in her brother, mother, sister, and sister; Anxiety Disorder in her father and mother; Asthma in her mother; Chronic Obstructive Pulmonary Disease in her mother; Depression in her father, mother, sister, and sister; Diabetes in her father; Heart Disease in her father; Hypertension in her father; Mental Illness in her father and mother; Obesity in her father; Respiratory in her brother, mother, sister, and sister.. SOCIAL HISTORY:   reports that she has quit smoking. Her smoking use included cigarettes. She started smoking about 2 months ago. She  smoked an average of 0.5 packs per day. She has been exposed to tobacco smoke. She has never used smokeless tobacco. She reports that she does not drink alcohol and does not use drugs.  Post Discharge Medication Reconciliation Status: discharge medications reconciled and changed, per note/orders.  Current Outpatient Medications   Medication Sig Dispense Refill    acetaminophen (TYLENOL) 325 MG tablet Take 650 mg by mouth every 4 hours as needed for pain or fever 2 tab every 4-6 hours as needed, do not exceed 9 tabs in 24hours      traZODone (DESYREL) 50 MG tablet 50 mg as needed      ACCU-CHEK GUIDE test strip Use to test blood sugar 3 times daily or as directed. 150 strip 1    albuterol (VENTOLIN HFA) 108 (90 Base) MCG/ACT inhaler INHALE 2 PUFFS INTO THE LUNGS EVERY SIX  HOURS AS NEEDED FOR SHORTNESS OF BREATH 18 g 10    ARIPiprazole (ABILIFY) 30 MG tablet Take 30 mg by mouth daily      atorvastatin (LIPITOR) 10 MG tablet Take 1 tablet (10 mg) by mouth daily 90 tablet 2    bumetanide (BUMEX) 0.5 MG tablet Take 1 tablet (0.5 mg) by mouth daily 30 tablet 2    carvedilol (COREG) 12.5 MG tablet Take 1 tablet (12.5 mg) by mouth 2 times daily 60 tablet 11    cloZAPine (CLOZARIL) 100 MG tablet Take 100 mg by mouth One in the morning, 2 at night      cloZAPine (CLOZARIL) 50 MG tablet 50 mg daily      COMPOUNDED NON-CONTROLLED SUBSTANCE (CMPD RX) - PHARMACY TO MIX COMPOUNDED MEDICATION Chloramphenicol 50 mg, sulfamethoxazole 50 mg, amphotericin B 5 mg, hydrocortisone 1 mg. 2-3 puffs to affected ear PRN itching/drainage 10 capsule 1    Continuous Blood Gluc Sensor (DEXCOM G7 SENSOR) MISC 1 Device every 10 days 3 each 5    cyclobenzaprine (FLEXERIL) 5 MG tablet Take 1 tablet (5 mg) by mouth 3 times daily as needed for muscle spasms 20 tablet 0    fluocinolone acetonide 0.01 % OIL Place 4 drops in ear(s) 2 times daily as needed (ear itching) 20 mL 3    FLUoxetine (PROZAC) 40 MG capsule Take 40 mg by mouth daily      glucose  "(BD GLUCOSE) 4 g chewable tablet Take 1 tablet by mouth every hour as needed for low blood sugar 30 tablet 4    Insulin Aspart FlexPen 100 UNIT/ML SOPN Inject 1-14 Units Subcutaneous 3 times daily (before meals) Inject 3 times daily with meals as instructed.  Max TDD 30. 30 mL 4    insulin glargine (LANTUS SOLOSTAR) 100 UNIT/ML pen Inject 32 Units Subcutaneous every morning 45 mL 4    Insulin Infusion Pump (MINIMED 780G INSULIN PUMP) KIT 1 each daily SmartGuard Target: 100; Active Insulin Time: 2 hours (recommended); SmartGuardTM Warmup/Manual Mode: Suspend before low (recommended) 1 kit 0    Insulin Infusion Pump Supplies (EXTENDED INFUSION SET 23\"/6MM) MISC 1 each every 7 days Max Total Daily Dose 70 units; Change infusion set & reservoir every 7 days (recommended) 10 each 6    Insulin Infusion Pump Supplies (EXTENDED RESERVOIR 3ML) MISC 1 each every 7 days 10 each 11    insulin pen needle (32G X 6 MM) 32G X 6 MM miscellaneous Use 4 pen needles daily. 150 each 10    insulin pen needle (ULTICARE MINI) 31G X 6 MM miscellaneous USE 1 PEN NEEDLE 4 times daily 400 each 11    lamoTRIgine (LAMICTAL) 100 MG tablet Take 100 mg by mouth At Bedtime      omeprazole (PRILOSEC) 20 MG DR capsule TAKE 1 CAPSULE BY MOUTH EVERY DAY 90 capsule 0    oxyCODONE (ROXICODONE) 5 MG tablet Take 1 tablet (5 mg) by mouth every 4 hours 60 tablet 0    topiramate (TOPAMAX) 50 MG tablet Take 1 tablet (50 mg) by mouth 2 times daily 60 tablet 3     ROS: 4 point ROS including Respiratory, CV, GI and , other than that noted in the HPI,  is negative    Vitals: /57   Pulse 82   Temp 98.1  F (36.7  C)   Resp 16   Ht 1.575 m (5' 2\")   Wt 103 kg (227 lb 1.6 oz)   SpO2 95%   BMI 41.54 kg/m    BGs:          Exam:  GENERAL APPEARANCE: Alert, in no distress, cooperative.   ENT: Mouth/posterior oropharynx intact w/ moist mucous membranes, hearing acuity WDL.  EYES: EOM, conjunctivae, lids, pupils and irises normal, PERRL2.   RESP: Respiratory " effort good, no respiratory distress, Lung sounds clear. On RA.   CV: Auscultation of heart reveals S1, S2, rate and rhythm regular, 2/6 systolic murmur, no rub or gallop, Edema 1-2+ BLE. Peripheral pulses are 2+.  ABDOMEN: Normal bowel sounds, soft, non-tender abdomen, and no masses palpated.  SKIN: Inspection/Palpation of skin and subcutaneous tissue baseline w/ fragility. No wounds/rashes noted except incision which is covered during this visit.   NEURO: CN II-XII at patient's baseline, sensation baseline PPS.  PSYCH: Insight, judgement, and memory are baseline, affect and mood are happy/engaged.    Lab/Diagnostic data: Recent labs in [facility] reviewed by me today.     ASSESSMENT/PLAN:  Closed nondisplaced subtrochanteric fracture of left femur with routine healing, subsequent encounter  Orthopedic Aftercare  Acquired asplenia  Leukocytosis, unspecified type  Borderline personality disorder (H)  Schizoaffective disorder, depressive type (H)  Insomnia, unspecified type  Type 2 diabetes mellitus with diabetic polyneuropathy, with long-term current use of insulin (H)  Acute on chronic. Tenuous/Complex.  Provider reviewed records from hospitalization, facility, and interpreted most recent imaging/lab work, and vital signs.  Notable asplenia with leukocytosis.  Given she is postoperative, noting white count was 23 inpatient.  Will closely trend this.  Noting poorly controlled diabetes with last A1c greater than 11%.  Does see endocrinology.  Noting previous consideration of metformin, but patient had renal impairment at the time.  She may be a good candidate for this now.  Given her edema, will trend renal function and electrolytes.  May consider metformin start upon follow-up.  No GERD, will decrease PPI to lowest, most effective dose.  Noting significant psychiatric history and large bone fracture/stressor.  Will consult in-house psychologist to help support.  Notable bilateral lower extremity edema with left slightly  greater than right.  Patient is on DVT prophylaxis with Lovenox.  Will add Tensoshapes.  Encouraged Divina to elevate her lower extremities and apply ice to aid in pain management.  Given CMS regulations, will place 14-day parameter on trazodone as noted below. Noted initial PRN orders for non antipsychotic psychotropic medications are limited to 14 days. Start new psychotropic medication x 14 days for occasional insomnia.   Provider coordinated care with  and primary care team.  This patient [place] of [place] hospital, but will resume care at her primary care clinic upon discharge from TCU, which we anticipate disposition is 1 to 2 weeks in length.  Follow up w/in 1 week or as needed.    Orders:  Decrease Protonix to 20mg PO Qday. Dx: GERD.  ACP/in-house psych eval/tx x1, routine. Dx: schizophrenia.   CBC, BMP x1 on 2/7. Dx: leukocytosis, CKD.  Tensoshapes, knee high, on in AM, off at HS. Dx: Edema.   RENEW PRN Trazodone x 14 days. Dx: insomnia.     Electronically signed by:  Dr. Danisha Yee, APRN CNP DNP

## 2024-02-06 ENCOUNTER — LAB REQUISITION (OUTPATIENT)
Dept: LAB | Facility: CLINIC | Age: 39
End: 2024-02-06

## 2024-02-06 DIAGNOSIS — D72.0 GENETIC ANOMALIES OF LEUKOCYTES (H): ICD-10-CM

## 2024-02-06 DIAGNOSIS — N18.9 CHRONIC KIDNEY DISEASE, UNSPECIFIED: ICD-10-CM

## 2024-02-07 LAB
ANION GAP SERPL CALCULATED.3IONS-SCNC: 14 MMOL/L (ref 7–15)
BUN SERPL-MCNC: 18.9 MG/DL (ref 6–20)
CALCIUM SERPL-MCNC: 9 MG/DL (ref 8.6–10)
CHLORIDE SERPL-SCNC: 108 MMOL/L (ref 98–107)
CREAT SERPL-MCNC: 1.2 MG/DL (ref 0.51–0.95)
DEPRECATED HCO3 PLAS-SCNC: 17 MMOL/L (ref 22–29)
EGFRCR SERPLBLD CKD-EPI 2021: 59 ML/MIN/1.73M2
ERYTHROCYTE [DISTWIDTH] IN BLOOD BY AUTOMATED COUNT: 17.1 % (ref 10–15)
GLUCOSE SERPL-MCNC: 232 MG/DL (ref 70–99)
HCT VFR BLD AUTO: 31.4 % (ref 35–47)
HGB BLD-MCNC: 9.3 G/DL (ref 11.7–15.7)
MCH RBC QN AUTO: 29.2 PG (ref 26.5–33)
MCHC RBC AUTO-ENTMCNC: 29.6 G/DL (ref 31.5–36.5)
MCV RBC AUTO: 98 FL (ref 78–100)
PLATELET # BLD AUTO: 714 10E3/UL (ref 150–450)
POTASSIUM SERPL-SCNC: 4.6 MMOL/L (ref 3.4–5.3)
RBC # BLD AUTO: 3.19 10E6/UL (ref 3.8–5.2)
SODIUM SERPL-SCNC: 139 MMOL/L (ref 135–145)
WBC # BLD AUTO: 10.7 10E3/UL (ref 4–11)

## 2024-02-07 PROCEDURE — P9604 ONE-WAY ALLOW PRORATED TRIP: HCPCS | Performed by: NURSE PRACTITIONER

## 2024-02-07 PROCEDURE — 85027 COMPLETE CBC AUTOMATED: CPT | Performed by: NURSE PRACTITIONER

## 2024-02-07 PROCEDURE — 80048 BASIC METABOLIC PNL TOTAL CA: CPT | Performed by: NURSE PRACTITIONER

## 2024-02-07 PROCEDURE — 36415 COLL VENOUS BLD VENIPUNCTURE: CPT | Performed by: NURSE PRACTITIONER

## 2024-02-13 ENCOUNTER — DISCHARGE SUMMARY NURSING HOME (OUTPATIENT)
Dept: GERIATRICS | Facility: CLINIC | Age: 39
End: 2024-02-13
Payer: COMMERCIAL

## 2024-02-13 VITALS
RESPIRATION RATE: 20 BRPM | WEIGHT: 230.4 LBS | BODY MASS INDEX: 42.4 KG/M2 | HEART RATE: 82 BPM | TEMPERATURE: 97.1 F | DIASTOLIC BLOOD PRESSURE: 67 MMHG | OXYGEN SATURATION: 92 % | HEIGHT: 62 IN | SYSTOLIC BLOOD PRESSURE: 156 MMHG

## 2024-02-13 DIAGNOSIS — Z90.81 ACQUIRED ASPLENIA: ICD-10-CM

## 2024-02-13 DIAGNOSIS — E11.42 TYPE 2 DIABETES MELLITUS WITH DIABETIC POLYNEUROPATHY, WITH LONG-TERM CURRENT USE OF INSULIN (H): ICD-10-CM

## 2024-02-13 DIAGNOSIS — F60.3 BORDERLINE PERSONALITY DISORDER (H): ICD-10-CM

## 2024-02-13 DIAGNOSIS — Z79.4 TYPE 2 DIABETES MELLITUS WITH DIABETIC POLYNEUROPATHY, WITH LONG-TERM CURRENT USE OF INSULIN (H): ICD-10-CM

## 2024-02-13 DIAGNOSIS — F25.1 SCHIZOAFFECTIVE DISORDER, DEPRESSIVE TYPE (H): ICD-10-CM

## 2024-02-13 DIAGNOSIS — Z47.89 ORTHOPEDIC AFTERCARE: ICD-10-CM

## 2024-02-13 DIAGNOSIS — S72.25XD CLOSED NONDISPLACED SUBTROCHANTERIC FRACTURE OF LEFT FEMUR WITH ROUTINE HEALING, SUBSEQUENT ENCOUNTER: Primary | ICD-10-CM

## 2024-02-13 PROCEDURE — 99316 NF DSCHRG MGMT 30 MIN+: CPT | Performed by: NURSE PRACTITIONER

## 2024-02-13 NOTE — PROGRESS NOTES
CHIEF COMPLAINT:   Chief Complaint   Patient presents with    Left Thigh - Fracture     Femur fracture. DOI 1/21/24, 3.5 wk s/p.Patient notes [place] airport. She was wearing a walking boot and it got caught and she went flying in the air and landed on her left side. She was taken to a hospital in [place] She had a left fixation of proximal femur and long antegrade nailing. She is present using a walker. She continues to have pain and it feels very stiff and tight. She has not started physical therapy. She was at a nursing home.          SURGICAL PROCEDURE: left femur fracture IMN ([place])  DATE OF PROCEDURE: 1/22/2024      HISTORY OF PRESENT ILLNESS    Divina Delgadillo is a 38 year old female seen for postoperative follow-up of a left femur fracture intramedullary nailing that occurred 3 weeks ago. She was in [place], on a moving sidewalk at the airport and her boot caught and went flying into the air and landed on her left side.  She was taken to a hospital in [place], noted to have a left femur fracture, and had an intramedullary nail placed. She presents today for management. She's weight bearing as tolerated, using a walker. She continues with pain, stiffness. She's not started therapy yet. She had been in a nursing home. Still has her staples in place.      Present symptoms: mild pain, mild swelling.    Pain severity: 4/10  Pain quality: dull and aching  Frequency of symptoms: are constant  Exacerbating Factors: weight bearing  Relieving Factors: rest, sitting  Night Pain: Yes  Pain while at rest: Yes     Other PMH:  has a past medical history of Acquired asplenia, Acute pain of left knee (03/22/2019), Acute-on-chronic kidney injury  (H24) (03/22/2019), ARF (acute renal failure) (H24) (03/23/2019), Asthma, Bipolar disorder (H), Cardiac murmur, Cervical high risk HPV (human papillomavirus) test positive (06/20/2017), Chiari malformation type I (H), Chronic bilateral  low back pain without sciatica, Deep venous thrombosis of arm (H) (01/06/2017), Depressive disorder, DVT (deep venous thrombosis) (H), DVT of upper extremity (deep vein thrombosis) (H) (2021), Esophageal reflux, Hypertension, Insomnia, Leukocytosis, Mild intermittent asthma, Morbid obesity (H), Mucinous neoplasm of pancreas, NAFL (nonalcoholic fatty liver), Neck pain, Nicotine abuse, Other specified types of schizophrenia, unspecified condition, Schizoaffective disorder, depressive type (H), Stage 3a chronic kidney disease (CKD) (H), Type 2 diabetes mellitus (H), Ulnar neuropathy of both upper extremities, and Vitamin D insufficiency.    She has no past medical history of Arthritis, Cerebral infarction (H), Congestive heart failure (H), COPD (chronic obstructive pulmonary disease) (H), Heart disease, History of blood transfusion, or Thyroid disease.  Patient Active Problem List   Diagnosis    Esophageal reflux    NAFL (nonalcoholic fatty liver)    Essential hypertension    Hyperlipidemia LDL goal <100    Stage 3 chronic kidney disease    Mucinous neoplasm of pancreas    Type 2 diabetes mellitus with stage 3 chronic kidney disease, with long-term current use of insulin (H)    Bipolar disorder (H)    Cervical high risk HPV (human papillomavirus) test positive    IUD (intrauterine device) in place    Morbid obesity (H)    Mild intermittent asthma with acute exacerbation    Nicotine abuse    Chronic leukocytosis    Acquired asplenia    Borderline personality disorder (H)    PTSD (post-traumatic stress disorder)    Long term (current) use of anticoagulants    Orthostatic hypotension    Tobacco abuse    Vitamin D insufficiency    Depressive disorder    Schizoaffective disorder, depressive type (H)    Uncontrolled type 2 diabetes mellitus with diabetic polyneuropathy, with long-term current use of insulin    Cardiac murmur    History of DVT of arm  (deep vein thrombosis)    Insomnia, unspecified type    Ulnar neuropathy of  both upper extremities    Chiari malformation type I (H)       Surgical Hx:  has a past surgical history that includes ENT surgery (10/01/2000); tonsillectomy (10/01/2000); splenectomy (2015); Adrenalectomy (Left, 2015); Pancreatectomy partial (2015); Percutaneous biopsy liver (Right, 11/14/2019); IR Liver Biopsy Percutaneous (11/14/2019); biopsy; Breast surgery (2013); and colonoscopy.    Medications:   Current Outpatient Medications:     ACCU-CHEK GUIDE test strip, Use to test blood sugar 3 times daily or as directed., Disp: 150 strip, Rfl: 1    acetaminophen (TYLENOL) 325 MG tablet, Take 650 mg by mouth every 4 hours as needed for pain or fever 2 tab every 4-6 hours as needed, do not exceed 9 tabs in 24hours, Disp: , Rfl:     albuterol (VENTOLIN HFA) 108 (90 Base) MCG/ACT inhaler, INHALE 2 PUFFS INTO THE LUNGS EVERY SIX  HOURS AS NEEDED FOR SHORTNESS OF BREATH, Disp: 18 g, Rfl: 10    ARIPiprazole (ABILIFY) 30 MG tablet, Take 30 mg by mouth daily, Disp: , Rfl:     atorvastatin (LIPITOR) 10 MG tablet, Take 1 tablet (10 mg) by mouth daily, Disp: 90 tablet, Rfl: 2    bumetanide (BUMEX) 0.5 MG tablet, Take 1 tablet (0.5 mg) by mouth daily, Disp: 30 tablet, Rfl: 2    carvedilol (COREG) 12.5 MG tablet, Take 1 tablet (12.5 mg) by mouth 2 times daily, Disp: 60 tablet, Rfl: 11    cloZAPine (CLOZARIL) 100 MG tablet, Take 100 mg by mouth One in the morning, 2 at night, Disp: , Rfl:     cloZAPine (CLOZARIL) 50 MG tablet, 50 mg daily, Disp: , Rfl:     COMPOUNDED NON-CONTROLLED SUBSTANCE (CMPD RX) - PHARMACY TO MIX COMPOUNDED MEDICATION, Chloramphenicol 50 mg, sulfamethoxazole 50 mg, amphotericin B 5 mg, hydrocortisone 1 mg. 2-3 puffs to affected ear PRN itching/drainage, Disp: 10 capsule, Rfl: 1    Continuous Blood Gluc Sensor (DEXCOM G7 SENSOR) MISC, 1 Device every 10 days, Disp: 3 each, Rfl: 5    cyclobenzaprine (FLEXERIL) 5 MG tablet, Take 1 tablet (5 mg) by mouth 3 times daily as needed for muscle spasms, Disp: 20 tablet,  "Rfl: 0    fluocinolone acetonide 0.01 % OIL, Place 4 drops in ear(s) 2 times daily as needed (ear itching), Disp: 20 mL, Rfl: 3    FLUoxetine (PROZAC) 40 MG capsule, Take 40 mg by mouth daily, Disp: , Rfl:     glucose (BD GLUCOSE) 4 g chewable tablet, Take 1 tablet by mouth every hour as needed for low blood sugar, Disp: 30 tablet, Rfl: 4    Insulin Aspart FlexPen 100 UNIT/ML SOPN, Inject 1-14 Units Subcutaneous 3 times daily (before meals) Inject 3 times daily with meals as instructed.  Max TDD 30., Disp: 30 mL, Rfl: 4    insulin glargine (LANTUS SOLOSTAR) 100 UNIT/ML pen, Inject 32 Units Subcutaneous every morning, Disp: 45 mL, Rfl: 4    Insulin Infusion Pump (MINIMED 780G INSULIN PUMP) KIT, 1 each daily SmartGuard Target: 100; Active Insulin Time: 2 hours (recommended); SmartGuardTM Warmup/Manual Mode: Suspend before low (recommended), Disp: 1 kit, Rfl: 0    Insulin Infusion Pump Supplies (EXTENDED INFUSION SET 23\"/6MM) MISC, 1 each every 7 days Max Total Daily Dose 70 units; Change infusion set & reservoir every 7 days (recommended), Disp: 10 each, Rfl: 6    Insulin Infusion Pump Supplies (EXTENDED RESERVOIR 3ML) MISC, 1 each every 7 days, Disp: 10 each, Rfl: 11    insulin pen needle (32G X 6 MM) 32G X 6 MM miscellaneous, Use 4 pen needles daily., Disp: 150 each, Rfl: 10    insulin pen needle (ULTICARE MINI) 31G X 6 MM miscellaneous, USE 1 PEN NEEDLE 4 times daily, Disp: 400 each, Rfl: 11    lamoTRIgine (LAMICTAL) 100 MG tablet, Take 100 mg by mouth At Bedtime, Disp: , Rfl:     omeprazole (PRILOSEC) 20 MG DR capsule, TAKE 1 CAPSULE BY MOUTH EVERY DAY, Disp: 90 capsule, Rfl: 0    oxyCODONE (ROXICODONE) 5 MG tablet, Take 1 tablet (5 mg) by mouth every 4 hours, Disp: 60 tablet, Rfl: 0    topiramate (TOPAMAX) 50 MG tablet, Take 1 tablet (50 mg) by mouth 2 times daily, Disp: 60 tablet, Rfl: 3    traZODone (DESYREL) 50 MG tablet, 50 mg as needed, Disp: , Rfl:     Allergies:   Allergies   Allergen Reactions    " "Acetaminophen Other (See Comments)     pt previously tried to overdose on it, PMD does not want pt taking per pt.     Latex Rash       Social Hx:  reports that she has quit smoking. Her smoking use included cigarettes. She started smoking about 2 months ago. She smoked an average of 0.5 packs per day. She has been exposed to tobacco smoke. She has never used smokeless tobacco. She reports that she does not drink alcohol and does not use drugs.    Family Hx: family history includes Allergies in her brother, mother, sister, and sister; Anxiety Disorder in her father and mother; Asthma in her mother; Chronic Obstructive Pulmonary Disease in her mother; Depression in her father, mother, sister, and sister; Diabetes in her father; Heart Disease in her father; Hypertension in her father; Mental Illness in her father and mother; Obesity in her father; Respiratory in her brother, mother, sister, and sister.    REVIEW OF SYSTEMS:  CONSTITUTIONAL:NEGATIVE for fever, chills, change in weight  INTEGUMENTARY/SKIN: NEGATIVE for worrisome rashes, moles or lesions  MUSCULOSKELETAL:See HPI above  NEURO: NEGATIVE for weakness, dizziness or paresthesias      PHYSICAL EXAM:  /74   Pulse 77   Ht 1.575 m (5' 2\")   BMI 42.14 kg/m     GENERAL APPEARANCE: healthy, alert, no distress ; accompanied by his mother, Juliana.  SKIN: no suspicious lesions or rashes  NEURO: Normal strength and tone, mentation intact and speech normal  PSYCH:  mentation appears normal and affect normal  RESPIRATORY: No increased work of breathing.          left LOWER EXTREMITY EXAM:  Gait: using walker for support, favors the left.  Intact sensation deep peroneal nerve, superficial peroneal nerve, med/lat tibial nerve, sural nerve, saphenous nerve  Intact EHL, EDL, TA, FHL, GS, quadriceps hamstrings and hip flexors  Notable quadriceps weakness.  calf soft and nttp or squeeze.  Edema: 1+    Wound / Incision: multiple surgical wounds healed with staples in " place, many of them ingrown. Mild erythema.  Inspection; swelling of the entire left lower extremity compared to the right.  Palpation: diffuse tender to palpation over the thigh, quads, incisions.  Non-tender: knee  Strength: grossly intact, weak.   No discomfort with hip range of motion.    X-RAY:  2 views left femur from 2/15/2024 were reviewed in clinic today. Postoperative changes securing a left proximal femoral shaft fracture with peripheral mineralization in the soft tissues about the fracture site. There is soft tissue swelling. Hardware appears in place. Intrapelvic IUD..        Impression: 38 year old female  3 weeks  postoperative intramedullary nailing of  left femoral shaft fracture , doing well.    Plan:   Staple removal.  Images reviewed.  Weight bearing status: weight bearing as tolerated   Pain control: over the counter as needed.  Immobilzation: none.  Physical Therapy.  Elevation of affected extremity  Return to clinic in 6 weeks, or sooner as needed.      Jimmie Nino M.D., M.S.  Dept. of Orthopaedic Surgery  [facility]

## 2024-02-13 NOTE — PROGRESS NOTES
[facility]  PATIENT'S NAME: Divina Delgadillo : 1985 MRN: [id number] Place of Service where encounter took place:  [facility]ly [facility], [facility] ON [facility] (TCU) [[id number]] PRIMARY CARE PROVIDER AND CLINIC RESPONSIBLE AFTER TRANSFER: Jaleesa Velasquez, VERONIQUE CNP, [address] / [place] [address]. [facility] [place] [place] Provider     Transferring 80 providers: Dr. Danisha Yee, APRN CNP DNP.  Recent Hospitalization/ED: [facility] ([place]) stay  to 24.  Date of TCU Admission: 24.  Date of TCU (anticipated) Discharge: 24.  Discharged to: previous independent home  Cognitive Scores: SLUMS , ACL 4.4/5.8, CPT total 5.3/5.6.  Physical Function: WBAT.  DME: None.  CODE STATUS/ADVANCE DIRECTIVES DISCUSSION:  Full Code.  ALLERGIES: Acetaminophen and Latex    DISCHARGE DIAGNOSIS/NURSING FACILITY COURSE:   Closed nondisplaced subtrochanteric fracture of left femur with routine healing, subsequent encounter  Orthopedic aftercare  Type 2 diabetes mellitus with diabetic polyneuropathy, with long-term current use of insulin (H)  Schizoaffective disorder, depressive type (H)  Borderline personality disorder (H)  Acquired asplenia    Hospitalization: Divina presented to [facility] on s/p fall at home. She was found to have a left femur fx. She underwent left fixaion of proximal femur and long antegrade nailng. Course complex w/ DM-I and out-of-state status.      Rehab: Divina presented to TCU on  s/p the above hospitalization. Given her underlying mental health comorbidities, we consulted w/ in-house psychology to support her during rehab. We trended her blood work and noted her prior leukocytosis resolved, while anemia was ongoing. We gave BLE light compression second to edema. Her Lovenox dosing ended on 24 and she was mobilizing well. We did note some intermittent HTN, but felt this was secondary to acute pain and she was routinely taking her Coreg. She will see ortho on 2/15 (TIANNA Stallworth)  for femur follow up.     Today, Divina is really excited to return home, and she feels ready.  She does not drive at baseline, and feels comfortable with the assistance she has through her mom and sisters.  She says her pain has vastly improved, and denies any numbness or tingling. She denies any shortness of breath, chest pain. She thinks her swelling has gone down as well, and she does have some light compression socks in her room (though they are not on right now).  She denies any new nausea, headaches, shortness of breath, and says her appetite is good.  She will return home with a regular insulin regimen, but will likely want to switch over to a pump.  PCP to follow this.  She will be picking up a walker and cane from a local medical supply store.      Recommendations to PCP:  Trend HTN. Noting intermittent high readings.   Trend CKD and Hgb.   MTM and polypharm reduction.   Divina should not resume driving w/ current medications.     Past Medical History:  has a past medical history of Acquired asplenia, Acute pain of left knee (03/22/2019), Acute-on-chronic kidney injury  (H24) (03/22/2019), ARF (acute renal failure) (H24) (03/23/2019), Asthma, Bipolar disorder (H), Cardiac murmur, Cervical high risk HPV (human papillomavirus) test positive (06/20/2017), Chiari malformation type I (H), Chronic bilateral low back pain without sciatica, Deep venous thrombosis of arm (H) (01/06/2017), Depressive disorder, DVT (deep venous thrombosis) (H), DVT of upper extremity (deep vein thrombosis) (H) (2021), Esophageal reflux, Hypertension, Insomnia, Leukocytosis, Mild intermittent asthma, Morbid obesity (H), Mucinous neoplasm of pancreas, NAFL (nonalcoholic fatty liver), Neck pain, Nicotine abuse, Other specified types of schizophrenia, unspecified condition, Schizoaffective disorder, depressive type (H), Stage 3a chronic kidney disease (CKD) (H), Type 2 diabetes mellitus (H), Ulnar neuropathy of both upper extremities, and  Vitamin D insufficiency.    She has no past medical history of Arthritis, Cerebral infarction (H), Congestive heart failure (H), COPD (chronic obstructive pulmonary disease) (H), Heart disease, History of blood transfusion, or Thyroid disease.  Discharge Medications:  Current Outpatient Medications   Medication Sig Dispense Refill    oxyCODONE (ROXICODONE) 5 MG tablet Take 1 tablet (5 mg) by mouth 3 times daily as needed for severe pain      ACCU-CHEK GUIDE test strip Use to test blood sugar 3 times daily or as directed. 150 strip 1    acetaminophen (TYLENOL) 325 MG tablet Take 650 mg by mouth every 4 hours as needed for pain or fever 2 tab every 4-6 hours as needed, do not exceed 9 tabs in 24hours      albuterol (VENTOLIN HFA) 108 (90 Base) MCG/ACT inhaler INHALE 2 PUFFS INTO THE LUNGS EVERY SIX  HOURS AS NEEDED FOR SHORTNESS OF BREATH 18 g 10    ARIPiprazole (ABILIFY) 30 MG tablet Take 30 mg by mouth daily      atorvastatin (LIPITOR) 10 MG tablet Take 1 tablet (10 mg) by mouth daily 90 tablet 2    bumetanide (BUMEX) 0.5 MG tablet Take 1 tablet (0.5 mg) by mouth daily 30 tablet 2    carvedilol (COREG) 12.5 MG tablet Take 1 tablet (12.5 mg) by mouth 2 times daily 60 tablet 11    cloZAPine (CLOZARIL) 100 MG tablet Take 100 mg by mouth One in the morning, 2 at night      cloZAPine (CLOZARIL) 50 MG tablet 50 mg daily      COMPOUNDED NON-CONTROLLED SUBSTANCE (CMPD RX) - PHARMACY TO MIX COMPOUNDED MEDICATION Chloramphenicol 50 mg, sulfamethoxazole 50 mg, amphotericin B 5 mg, hydrocortisone 1 mg. 2-3 puffs to affected ear PRN itching/drainage 10 capsule 1    Continuous Blood Gluc Sensor (DEXCOM G7 SENSOR) MISC 1 Device every 10 days 3 each 5    fluocinolone acetonide 0.01 % OIL Place 4 drops in ear(s) 2 times daily as needed (ear itching) 20 mL 3    FLUoxetine (PROZAC) 40 MG capsule Take 40 mg by mouth daily      glucose (BD GLUCOSE) 4 g chewable tablet Take 1 tablet by mouth every hour as needed for low blood sugar 30  "tablet 4    Insulin Aspart FlexPen 100 UNIT/ML SOPN Inject 1-14 Units Subcutaneous 3 times daily (before meals) Inject 3 times daily with meals as instructed.  Max TDD 30. 30 mL 4    insulin glargine (LANTUS SOLOSTAR) 100 UNIT/ML pen Inject 32 Units Subcutaneous every morning 45 mL 4    Insulin Infusion Pump (MINIMED 780G INSULIN PUMP) KIT 1 each daily SmartGuard Target: 100; Active Insulin Time: 2 hours (recommended); SmartGuardTM Warmup/Manual Mode: Suspend before low (recommended) 1 kit 0    Insulin Infusion Pump Supplies (EXTENDED INFUSION SET 23\"/6MM) MISC 1 each every 7 days Max Total Daily Dose 70 units; Change infusion set & reservoir every 7 days (recommended) 10 each 6    Insulin Infusion Pump Supplies (EXTENDED RESERVOIR 3ML) MISC 1 each every 7 days 10 each 11    insulin pen needle (32G X 6 MM) 32G X 6 MM miscellaneous Use 4 pen needles daily. 150 each 10    insulin pen needle (ULTICARE MINI) 31G X 6 MM miscellaneous USE 1 PEN NEEDLE 4 times daily 400 each 11    lamoTRIgine (LAMICTAL) 100 MG tablet Take 100 mg by mouth At Bedtime      omeprazole (PRILOSEC) 20 MG DR capsule TAKE 1 CAPSULE BY MOUTH EVERY DAY 90 capsule 0    topiramate (TOPAMAX) 50 MG tablet Take 1 tablet (50 mg) by mouth 2 times daily 60 tablet 3    traZODone (DESYREL) 50 MG tablet 50 mg as needed       ROS: 4 point ROS including Respiratory, CV, GI and , other than that noted in the HPI, is negative.    Physical Exam: Vitals: BP (!) 156/67   Pulse 82   Temp 97.1  F (36.2  C)   Resp 20   Ht 1.575 m (5' 2\")   Wt 104.5 kg (230 lb 6.4 oz)   SpO2 92%   BMI 42.14 kg/m    GENERAL APPEARANCE: Alert, in no distress, cooperative.   ENT: Mouth/posterior oropharynx intact w/ moist mucous membranes, hearing acuity WDL.  EYES: EOM, conjunctivae, lids, pupils and irises normal, PERRL2.   RESP: Respiratory effort good, no respiratory distress. On RA.   CV: trace BLE. Peripheral pulses are 2+.  ABDOMEN: Normal bowel sounds, soft, non-tender " abdomen, and no masses palpated.  SKIN: Inspection/Palpation of skin and subcutaneous tissue baseline w/ fragility. No wounds/rashes noted except incision which is covered.  NEURO: CN II-XII at patient's baseline, sensation baseline PPS.  PSYCH: Insight, judgement, and memory are baseline, affect and mood are happy/engaged.    Facility Labs: Labs done in SNF are in [place] EPIC. Please refer to them using EPIC/Care Everywhere.    DISCHARGE PLAN:  Follow up labs: No labs orders/due.  Medical Follow Up:  Follow up with primary care provider in 1-4 weeks  [facility] referral needed: Yes: PCP to place/consider.  Current [place] scheduled appointments:   Next 5 appointments (look out 90 days)      Feb 13, 2024 10:30 AM  [facility] with Leana Hollis [facility] ([facility] [address] [address]  [phone]         Discharge Services: Home Care:  Occupational Therapy, Physical Therapy, Registered Nurse, and Home Health Aide    Orders:  Discontinue Flexeril.  Decrease Oxycodone to 5mg PO TID PRN. Dx: severe pain.     TOTAL DISCHARGE TIME:   Greater than 30 minutes    Electronically signed by:  Dr. Danisha Yee, APRN CNP DNP  ______________      Documentation of Face-to-Face and Certification for Home Health Services   Patient: Divina Delgadillo YOB: 1985  MRN: [id number]  Today's Date: 2/13/2024  I certify that patient: Divina Delgadillo is under my care and that I had a face-to-face encounter that meets the provider  face-to-face encounter requirements with this patient on: 2/13/2024. This encounter with the patient was in whole, or in part, for the following medical condition, which is the primary reason for home health care: Left femur fx. I certify that, based on my findings, the following services are medically necessary home health services: Nursing, Occupational Therapy, and Physical Therapy. My clinical findings support  the need for the above services because: Nurse is needed: To provide assessment and oversight required in the home to assure adherence to the medical plan due to: Left femur fx.., Occupational Therapy Services are needed to assess and treat cognitive ability and address ADL safety due to impairment in mobility., and Physical Therapy Services are needed to assess and treat the following functional impairments: mobility.    Further, I certify that my clinical findings support that this patient is homebound (i.e. absences from home require considerable and taxing effort and are for medical reasons or [employer] services or infrequently or of short duration when for other reasons) because: Requires assistance of another person or specialized equipment to access medical services because patient: Range of motion limitations prevents ability to exit home safely...    Based on the above findings. I certify that this patient is confined to the home and needs intermittent skilled nursing care, physical therapy and/or speech therapy.  The patient is under my care, and I have initiated the establishment of the plan of care.  This patient will be followed by a provider who will periodically review the plan of care.    Provider to give follow up care: Jaleesa Velasquez    Responsible Medicare certified PEC[facility] Provider: Dr. Danisha Yee, VERONIQUE CNP DNP  Provider Signature: See electronic signature associated with these discharge orders.  Date: 2/13/2024

## 2024-02-13 NOTE — LETTER
2024        RE: Divina Delgadillo  [address] [facility] [place] [address]        [facility] DISCHARGE SUMMARY  PATIENT'S NAME: Divina Delgadillo : 1985 MRN: [id number] Place of Service where encounter took place:  [facility] ON [facility] (TCU) [[id number]] PRIMARY CARE PROVIDER AND CLINIC RESPONSIBLE AFTER TRANSFER: Jaleesa Velasquez, APRN CNP, [address] [facility] / [place] [address]. [facility] [place]     Transferring 80 providers: Dr. Danisha Yee, APRN CNP DNP.  Recent Hospitalization/ED: [facility] ([place]) stay  to 24.  Date of TCU Admission: 24.  Date of TCU (anticipated) Discharge: 24.  Discharged to: previous independent home  Cognitive Scores: SLUMS 20/30, ACL 4.4/5.8, CPT total 5.3/5.6.  Physical Function: WBAT.  DME: None.  CODE STATUS/ADVANCE DIRECTIVES DISCUSSION:  Full Code.  ALLERGIES: Acetaminophen and Latex    DISCHARGE DIAGNOSIS/NURSING FACILITY COURSE:   Closed nondisplaced subtrochanteric fracture of left femur with routine healing, subsequent encounter  Orthopedic aftercare  Type 2 diabetes mellitus with diabetic polyneuropathy, with long-term current use of insulin (H)  Schizoaffective disorder, depressive type (H)  Borderline personality disorder (H)  Acquired asplenia    Hospitalization: Divina presented to [facility] on s/p fall at home. She was found to have a left femur fx. She underwent left fixaion of proximal femur and long antegrade nailng. Course complex w/ DM-I and out-of-state status.      Rehab: Divina presented to TCU on  s/p the above hospitalization. Given her underlying mental health comorbidities, we consulted w/ in-house psychology to support her during rehab. We trended her blood work and noted her prior leukocytosis resolved, while anemia was ongoing. We gave BLE light compression second to edema. Her Lovenox dosing ended on 24 and she was mobilizing well. We did note some intermittent HTN, but felt this was secondary to  acute pain and she was routinely taking her Coreg. She will see ortho on 2/15 (TIANNA Stallworth) for femur follow up.     Today, Divina is really excited to return home, and she feels ready.  She does not drive at baseline, and feels comfortable with the assistance she has through her mom and sisters.  She says her pain has vastly improved, and denies any numbness or tingling. She denies any shortness of breath, chest pain. She thinks her swelling has gone down as well, and she does have some light compression socks in her room (though they are not on right now).  She denies any new nausea, headaches, shortness of breath, and says her appetite is good.  She will return home with a regular insulin regimen, but will likely want to switch over to a pump.  PCP to follow this.  She will be picking up a walker and cane from a local medical supply store.      Recommendations to PCP:  Trend HTN. Noting intermittent high readings.   Trend CKD and Hgb.   MTM and polypharm reduction.   Divina should not resume driving w/ current medications.     Past Medical History:  has a past medical history of Acquired asplenia, Acute pain of left knee (03/22/2019), Acute-on-chronic kidney injury  (H24) (03/22/2019), ARF (acute renal failure) (H24) (03/23/2019), Asthma, Bipolar disorder (H), Cardiac murmur, Cervical high risk HPV (human papillomavirus) test positive (06/20/2017), Chiari malformation type I (H), Chronic bilateral low back pain without sciatica, Deep venous thrombosis of arm (H) (01/06/2017), Depressive disorder, DVT (deep venous thrombosis) (H), DVT of upper extremity (deep vein thrombosis) (H) (2021), Esophageal reflux, Hypertension, Insomnia, Leukocytosis, Mild intermittent asthma, Morbid obesity (H), Mucinous neoplasm of pancreas, NAFL (nonalcoholic fatty liver), Neck pain, Nicotine abuse, Other specified types of schizophrenia, unspecified condition, Schizoaffective disorder, depressive type (H), Stage 3a chronic kidney disease  (CKD) (H), Type 2 diabetes mellitus (H), Ulnar neuropathy of both upper extremities, and Vitamin D insufficiency.    She has no past medical history of Arthritis, Cerebral infarction (H), Congestive heart failure (H), COPD (chronic obstructive pulmonary disease) (H), Heart disease, History of blood transfusion, or Thyroid disease.  Discharge Medications:  Current Outpatient Medications   Medication Sig Dispense Refill     oxyCODONE (ROXICODONE) 5 MG tablet Take 1 tablet (5 mg) by mouth 3 times daily as needed for severe pain       ACCU-CHEK GUIDE test strip Use to test blood sugar 3 times daily or as directed. 150 strip 1     acetaminophen (TYLENOL) 325 MG tablet Take 650 mg by mouth every 4 hours as needed for pain or fever 2 tab every 4-6 hours as needed, do not exceed 9 tabs in 24hours       albuterol (VENTOLIN HFA) 108 (90 Base) MCG/ACT inhaler INHALE 2 PUFFS INTO THE LUNGS EVERY SIX  HOURS AS NEEDED FOR SHORTNESS OF BREATH 18 g 10     ARIPiprazole (ABILIFY) 30 MG tablet Take 30 mg by mouth daily       atorvastatin (LIPITOR) 10 MG tablet Take 1 tablet (10 mg) by mouth daily 90 tablet 2     bumetanide (BUMEX) 0.5 MG tablet Take 1 tablet (0.5 mg) by mouth daily 30 tablet 2     carvedilol (COREG) 12.5 MG tablet Take 1 tablet (12.5 mg) by mouth 2 times daily 60 tablet 11     cloZAPine (CLOZARIL) 100 MG tablet Take 100 mg by mouth One in the morning, 2 at night       cloZAPine (CLOZARIL) 50 MG tablet 50 mg daily       COMPOUNDED NON-CONTROLLED SUBSTANCE (CMPD RX) - PHARMACY TO MIX COMPOUNDED MEDICATION Chloramphenicol 50 mg, sulfamethoxazole 50 mg, amphotericin B 5 mg, hydrocortisone 1 mg. 2-3 puffs to affected ear PRN itching/drainage 10 capsule 1     Continuous Blood Gluc Sensor (DEXCOM G7 SENSOR) MISC 1 Device every 10 days 3 each 5     fluocinolone acetonide 0.01 % OIL Place 4 drops in ear(s) 2 times daily as needed (ear itching) 20 mL 3     FLUoxetine (PROZAC) 40 MG capsule Take 40 mg by mouth daily        "glucose (BD GLUCOSE) 4 g chewable tablet Take 1 tablet by mouth every hour as needed for low blood sugar 30 tablet 4     Insulin Aspart FlexPen 100 UNIT/ML SOPN Inject 1-14 Units Subcutaneous 3 times daily (before meals) Inject 3 times daily with meals as instructed.  Max TDD 30. 30 mL 4     insulin glargine (LANTUS SOLOSTAR) 100 UNIT/ML pen Inject 32 Units Subcutaneous every morning 45 mL 4     Insulin Infusion Pump (MINIMED 780G INSULIN PUMP) KIT 1 each daily SmartGuard Target: 100; Active Insulin Time: 2 hours (recommended); SmartGuardTM Warmup/Manual Mode: Suspend before low (recommended) 1 kit 0     Insulin Infusion Pump Supplies (EXTENDED INFUSION SET 23\"/6MM) MISC 1 each every 7 days Max Total Daily Dose 70 units; Change infusion set & reservoir every 7 days (recommended) 10 each 6     Insulin Infusion Pump Supplies (EXTENDED RESERVOIR 3ML) MISC 1 each every 7 days 10 each 11     insulin pen needle (32G X 6 MM) 32G X 6 MM miscellaneous Use 4 pen needles daily. 150 each 10     insulin pen needle (ULTICARE MINI) 31G X 6 MM miscellaneous USE 1 PEN NEEDLE 4 times daily 400 each 11     lamoTRIgine (LAMICTAL) 100 MG tablet Take 100 mg by mouth At Bedtime       omeprazole (PRILOSEC) 20 MG DR capsule TAKE 1 CAPSULE BY MOUTH EVERY DAY 90 capsule 0     topiramate (TOPAMAX) 50 MG tablet Take 1 tablet (50 mg) by mouth 2 times daily 60 tablet 3     traZODone (DESYREL) 50 MG tablet 50 mg as needed       ROS: 4 point ROS including Respiratory, CV, GI and , other than that noted in the HPI, is negative.    Physical Exam: Vitals: BP (!) 156/67   Pulse 82   Temp 97.1  F (36.2  C)   Resp 20   Ht 1.575 m (5' 2\")   Wt 104.5 kg (230 lb 6.4 oz)   SpO2 92%   BMI 42.14 kg/m    GENERAL APPEARANCE: Alert, in no distress, cooperative.   ENT: Mouth/posterior oropharynx intact w/ moist mucous membranes, hearing acuity WDL.  EYES: EOM, conjunctivae, lids, pupils and irises normal, PERRL2.   RESP: Respiratory effort good, no " respiratory distress. On RA.   CV: trace BLE. Peripheral pulses are 2+.  ABDOMEN: Normal bowel sounds, soft, non-tender abdomen, and no masses palpated.  SKIN: Inspection/Palpation of skin and subcutaneous tissue baseline w/ fragility. No wounds/rashes noted except incision which is covered.  NEURO: CN II-XII at patient's baseline, sensation baseline PPS.  PSYCH: Insight, judgement, and memory are baseline, affect and mood are happy/engaged.    Facility Labs: Labs done in SNF are in [facility]. Please refer to them using EPIC/Care Everywhere.    DISCHARGE PLAN:  Follow up labs: No labs orders/due.  Medical Follow Up:  Follow up with primary care provider in 1-4 weeks  [facility] referral needed: Yes: PCP to place/consider.  Current [place] scheduled appointments:   Next 5 appointments (look out 90 days)      Feb 13, 2024 10:30 AM  [facility] New with Leana Hollis [facility] [address] [place] [id number]  [phone]         Discharge Services: Home Care:  Occupational Therapy, Physical Therapy, Registered Nurse, and Home Health Aide    Orders:  Discontinue Flexeril.  Decrease Oxycodone to 5mg PO TID PRN. Dx: severe pain.     TOTAL DISCHARGE TIME:   Greater than 30 minutes    Electronically signed by:  VERONIQUE Randall CNP DNP  ______________      Documentation of Face-to-Face and Certification for Home Health Services   Patient: Divina Delgadillo YOB: 1985  MRN: [id number]  Today's Date: 2/13/2024  I certify that patient: Divina Delgadillo is under my care and that I had a face-to-face encounter that meets the provider  face-to-face encounter requirements with this patient on: 2/13/2024. This encounter with the patient was in whole, or in part, for the following medical condition, which is the primary reason for home health care: Left femur fx. I certify that, based on my findings, the following services are  medically necessary home health services: Nursing, Occupational Therapy, and Physical Therapy. My clinical findings support the need for the above services because: Nurse is needed: To provide assessment and oversight required in the home to assure adherence to the medical plan due to: Left femur fx.., Occupational Therapy Services are needed to assess and treat cognitive ability and address ADL safety due to impairment in mobility., and Physical Therapy Services are needed to assess and treat the following functional impairments: mobility.    Further, I certify that my clinical findings support that this patient is homebound (i.e. absences from home require considerable and taxing effort and are for medical reasons or Druze services or infrequently or of short duration when for other reasons) because: Requires assistance of another person or specialized equipment to access medical services because patient: Range of motion limitations prevents ability to exit home safely...    Based on the above findings. I certify that this patient is confined to the home and needs intermittent skilled nursing care, physical therapy and/or speech therapy.  The patient is under my care, and I have initiated the establishment of the plan of care.  This patient will be followed by a provider who will periodically review the plan of care.    Provider to give follow up care: Jaleesa Velasquez    Responsible Medicare certified P[employer] Provider: VERONIQUE Randall CNP DNP  Provider Signature: See electronic signature associated with these discharge orders.  Date: 2/13/2024               Sincerely,        VERONIQUE Garcia CNP

## 2024-02-14 RX ORDER — OXYCODONE HYDROCHLORIDE 5 MG/1
5 TABLET ORAL 3 TIMES DAILY PRN
Start: 2024-02-14 | End: 2024-03-07

## 2024-02-15 ENCOUNTER — OFFICE VISIT (OUTPATIENT)
Dept: ORTHOPEDICS | Facility: CLINIC | Age: 39
End: 2024-02-15
Payer: COMMERCIAL

## 2024-02-15 ENCOUNTER — ANCILLARY PROCEDURE (OUTPATIENT)
Dept: GENERAL RADIOLOGY | Facility: CLINIC | Age: 39
End: 2024-02-15
Attending: ORTHOPAEDIC SURGERY
Payer: COMMERCIAL

## 2024-02-15 ENCOUNTER — PATIENT OUTREACH (OUTPATIENT)
Dept: CARE COORDINATION | Facility: CLINIC | Age: 39
End: 2024-02-15

## 2024-02-15 VITALS
HEART RATE: 77 BPM | SYSTOLIC BLOOD PRESSURE: 120 MMHG | DIASTOLIC BLOOD PRESSURE: 74 MMHG | HEIGHT: 62 IN | BODY MASS INDEX: 42.14 KG/M2

## 2024-02-15 DIAGNOSIS — S72.92XA CLOSED FRACTURE OF LEFT FEMUR, UNSPECIFIED FRACTURE MORPHOLOGY, UNSPECIFIED PORTION OF FEMUR, INITIAL ENCOUNTER (H): ICD-10-CM

## 2024-02-15 DIAGNOSIS — S72.302A: Primary | ICD-10-CM

## 2024-02-15 DIAGNOSIS — S72.302A: ICD-10-CM

## 2024-02-15 PROCEDURE — 73552 X-RAY EXAM OF FEMUR 2/>: CPT | Mod: TC | Performed by: RADIOLOGY

## 2024-02-15 PROCEDURE — 99203 OFFICE O/P NEW LOW 30 MIN: CPT | Performed by: ORTHOPAEDIC SURGERY

## 2024-02-15 ASSESSMENT — ACTIVITIES OF DAILY LIVING (ADL): DEPENDENT_IADLS:: TRANSPORTATION

## 2024-02-15 ASSESSMENT — PAIN SCALES - GENERAL: PAINLEVEL: MODERATE PAIN (4)

## 2024-02-15 NOTE — LETTER
2/15/2024         RE: Divina Delgadillo  [address] [facility] [address]        Dear Colleague,    Thank you for referring your patient, Divina Delgadillo, to the [facility]. Please see a copy of my visit note below.    CHIEF COMPLAINT:   Chief Complaint   Patient presents with     Left Thigh - Fracture     Femur fracture. DOI 1/21/24, 3.5 wk s/p.Patient notes [place]. She was wearing a walking boot and it got caught and she went flying in the air and landed on her left side. She was taken to a hospital in [place] She had a left fixation of proximal femur and long antegrade nailing. She is present using a walker. She continues to have pain and it feels very stiff and tight. She has not started physical therapy. She was at a nursing home.          SURGICAL PROCEDURE: left femur fracture IMN ([place])  DATE OF PROCEDURE: 1/22/2024      HISTORY OF PRESENT ILLNESS    Divina Delgadillo is a 38 year old female seen for postoperative follow-up of a left femur fracture intramedullary nailing that occurred 3 weeks ago. She was in [place], on a moving sidewalk at the airport and her boot caught and went flying into the air and landed on her left side.  She was taken to a hospital in [place], noted to have a left femur fracture, and had an intramedullary nail placed. She presents today for management. She's weight bearing as tolerated, using a walker. She continues with pain, stiffness. She's not started therapy yet. She had been in a nursing home. Still has her staples in place.      Present symptoms: mild pain, mild swelling.    Pain severity: 4/10  Pain quality: dull and aching  Frequency of symptoms: are constant  Exacerbating Factors: weight bearing  Relieving Factors: rest, sitting  Night Pain: Yes  Pain while at rest: Yes     Other PMH:  has a past medical history of Acquired asplenia, Acute pain of left knee (03/22/2019),  Acute-on-chronic kidney injury  (H24) (03/22/2019), ARF (acute renal failure) (H24) (03/23/2019), Asthma, Bipolar disorder (H), Cardiac murmur, Cervical high risk HPV (human papillomavirus) test positive (06/20/2017), Chiari malformation type I (H), Chronic bilateral low back pain without sciatica, Deep venous thrombosis of arm (H) (01/06/2017), Depressive disorder, DVT (deep venous thrombosis) (H), DVT of upper extremity (deep vein thrombosis) (H) (2021), Esophageal reflux, Hypertension, Insomnia, Leukocytosis, Mild intermittent asthma, Morbid obesity (H), Mucinous neoplasm of pancreas, NAFL (nonalcoholic fatty liver), Neck pain, Nicotine abuse, Other specified types of schizophrenia, unspecified condition, Schizoaffective disorder, depressive type (H), Stage 3a chronic kidney disease (CKD) (H), Type 2 diabetes mellitus (H), Ulnar neuropathy of both upper extremities, and Vitamin D insufficiency.    She has no past medical history of Arthritis, Cerebral infarction (H), Congestive heart failure (H), COPD (chronic obstructive pulmonary disease) (H), Heart disease, History of blood transfusion, or Thyroid disease.  Patient Active Problem List   Diagnosis     Esophageal reflux     NAFL (nonalcoholic fatty liver)     Essential hypertension     Hyperlipidemia LDL goal <100     Stage 3 chronic kidney disease     Mucinous neoplasm of pancreas     Type 2 diabetes mellitus with stage 3 chronic kidney disease, with long-term current use of insulin (H)     Bipolar disorder (H)     Cervical high risk HPV (human papillomavirus) test positive     IUD (intrauterine device) in place     Morbid obesity (H)     Mild intermittent asthma with acute exacerbation     Nicotine abuse     Chronic leukocytosis     Acquired asplenia     Borderline personality disorder (H)     PTSD (post-traumatic stress disorder)     Long term (current) use of anticoagulants     Orthostatic hypotension     Tobacco abuse     Vitamin D insufficiency      Depressive disorder     Schizoaffective disorder, depressive type (H)     Uncontrolled type 2 diabetes mellitus with diabetic polyneuropathy, with long-term current use of insulin     Cardiac murmur     History of DVT of arm  (deep vein thrombosis)     Insomnia, unspecified type     Ulnar neuropathy of both upper extremities     Chiari malformation type I (H)       Surgical Hx:  has a past surgical history that includes ENT surgery (10/01/2000); tonsillectomy (10/01/2000); splenectomy (2015); Adrenalectomy (Left, 2015); Pancreatectomy partial (2015); Percutaneous biopsy liver (Right, 11/14/2019); IR Liver Biopsy Percutaneous (11/14/2019); biopsy; Breast surgery (2013); and colonoscopy.    Medications:   Current Outpatient Medications:      ACCU-CHEK GUIDE test strip, Use to test blood sugar 3 times daily or as directed., Disp: 150 strip, Rfl: 1     acetaminophen (TYLENOL) 325 MG tablet, Take 650 mg by mouth every 4 hours as needed for pain or fever 2 tab every 4-6 hours as needed, do not exceed 9 tabs in 24hours, Disp: , Rfl:      albuterol (VENTOLIN HFA) 108 (90 Base) MCG/ACT inhaler, INHALE 2 PUFFS INTO THE LUNGS EVERY SIX  HOURS AS NEEDED FOR SHORTNESS OF BREATH, Disp: 18 g, Rfl: 10     ARIPiprazole (ABILIFY) 30 MG tablet, Take 30 mg by mouth daily, Disp: , Rfl:      atorvastatin (LIPITOR) 10 MG tablet, Take 1 tablet (10 mg) by mouth daily, Disp: 90 tablet, Rfl: 2     bumetanide (BUMEX) 0.5 MG tablet, Take 1 tablet (0.5 mg) by mouth daily, Disp: 30 tablet, Rfl: 2     carvedilol (COREG) 12.5 MG tablet, Take 1 tablet (12.5 mg) by mouth 2 times daily, Disp: 60 tablet, Rfl: 11     cloZAPine (CLOZARIL) 100 MG tablet, Take 100 mg by mouth One in the morning, 2 at night, Disp: , Rfl:      cloZAPine (CLOZARIL) 50 MG tablet, 50 mg daily, Disp: , Rfl:      COMPOUNDED NON-CONTROLLED SUBSTANCE (CMPD RX) - PHARMACY TO MIX COMPOUNDED MEDICATION, Chloramphenicol 50 mg, sulfamethoxazole 50 mg, amphotericin B 5 mg, hydrocortisone  "1 mg. 2-3 puffs to affected ear PRN itching/drainage, Disp: 10 capsule, Rfl: 1     Continuous Blood Gluc Sensor (DEXCOM G7 SENSOR) MISC, 1 Device every 10 days, Disp: 3 each, Rfl: 5     cyclobenzaprine (FLEXERIL) 5 MG tablet, Take 1 tablet (5 mg) by mouth 3 times daily as needed for muscle spasms, Disp: 20 tablet, Rfl: 0     fluocinolone acetonide 0.01 % OIL, Place 4 drops in ear(s) 2 times daily as needed (ear itching), Disp: 20 mL, Rfl: 3     FLUoxetine (PROZAC) 40 MG capsule, Take 40 mg by mouth daily, Disp: , Rfl:      glucose (BD GLUCOSE) 4 g chewable tablet, Take 1 tablet by mouth every hour as needed for low blood sugar, Disp: 30 tablet, Rfl: 4     Insulin Aspart FlexPen 100 UNIT/ML SOPN, Inject 1-14 Units Subcutaneous 3 times daily (before meals) Inject 3 times daily with meals as instructed.  Max TDD 30., Disp: 30 mL, Rfl: 4     insulin glargine (LANTUS SOLOSTAR) 100 UNIT/ML pen, Inject 32 Units Subcutaneous every morning, Disp: 45 mL, Rfl: 4     Insulin Infusion Pump (MINIMED 780G INSULIN PUMP) KIT, 1 each daily SmartGuard Target: 100; Active Insulin Time: 2 hours (recommended); SmartGuardTM Warmup/Manual Mode: Suspend before low (recommended), Disp: 1 kit, Rfl: 0     Insulin Infusion Pump Supplies (EXTENDED INFUSION SET 23\"/6MM) MISC, 1 each every 7 days Max Total Daily Dose 70 units; Change infusion set & reservoir every 7 days (recommended), Disp: 10 each, Rfl: 6     Insulin Infusion Pump Supplies (EXTENDED RESERVOIR 3ML) MISC, 1 each every 7 days, Disp: 10 each, Rfl: 11     insulin pen needle (32G X 6 MM) 32G X 6 MM miscellaneous, Use 4 pen needles daily., Disp: 150 each, Rfl: 10     insulin pen needle (ULTICARE MINI) 31G X 6 MM miscellaneous, USE 1 PEN NEEDLE 4 times daily, Disp: 400 each, Rfl: 11     lamoTRIgine (LAMICTAL) 100 MG tablet, Take 100 mg by mouth At Bedtime, Disp: , Rfl:      omeprazole (PRILOSEC) 20 MG DR capsule, TAKE 1 CAPSULE BY MOUTH EVERY DAY, Disp: 90 capsule, Rfl: 0     oxyCODONE " "(ROXICODONE) 5 MG tablet, Take 1 tablet (5 mg) by mouth every 4 hours, Disp: 60 tablet, Rfl: 0     topiramate (TOPAMAX) 50 MG tablet, Take 1 tablet (50 mg) by mouth 2 times daily, Disp: 60 tablet, Rfl: 3     traZODone (DESYREL) 50 MG tablet, 50 mg as needed, Disp: , Rfl:     Allergies:   Allergies   Allergen Reactions     Acetaminophen Other (See Comments)     pt previously tried to overdose on it, PMD does not want pt taking per pt.      Latex Rash       Social Hx:  reports that she has quit smoking. Her smoking use included cigarettes. She started smoking about 2 months ago. She smoked an average of 0.5 packs per day. She has been exposed to tobacco smoke. She has never used smokeless tobacco. She reports that she does not drink alcohol and does not use drugs.    Family Hx: family history includes Allergies in her brother, mother, sister, and sister; Anxiety Disorder in her father and mother; Asthma in her mother; Chronic Obstructive Pulmonary Disease in her mother; Depression in her father, mother, sister, and sister; Diabetes in her father; Heart Disease in her father; Hypertension in her father; Mental Illness in her father and mother; Obesity in her father; Respiratory in her brother, mother, sister, and sister.    REVIEW OF SYSTEMS:  CONSTITUTIONAL:NEGATIVE for fever, chills, change in weight  INTEGUMENTARY/SKIN: NEGATIVE for worrisome rashes, moles or lesions  MUSCULOSKELETAL:See HPI above  NEURO: NEGATIVE for weakness, dizziness or paresthesias      PHYSICAL EXAM:  /74   Pulse 77   Ht 1.575 m (5' 2\")   BMI 42.14 kg/m     GENERAL APPEARANCE: healthy, alert, no distress ; accompanied by his mother, Juliana.  SKIN: no suspicious lesions or rashes  NEURO: Normal strength and tone, mentation intact and speech normal  PSYCH:  mentation appears normal and affect normal  RESPIRATORY: No increased work of breathing.          left LOWER EXTREMITY EXAM:  Gait: using walker for support, favors the left.  Intact " sensation deep peroneal nerve, superficial peroneal nerve, med/lat tibial nerve, sural nerve, saphenous nerve  Intact EHL, EDL, TA, FHL, GS, quadriceps hamstrings and hip flexors  Notable quadriceps weakness.  calf soft and nttp or squeeze.  Edema: 1+    Wound / Incision: multiple surgical wounds healed with staples in place, many of them ingrown. Mild erythema.  Inspection; swelling of the entire left lower extremity compared to the right.  Palpation: diffuse tender to palpation over the thigh, quads, incisions.  Non-tender: knee  Strength: grossly intact, weak.   No discomfort with hip range of motion.    X-RAY:  2 views left femur from 2/15/2024 were reviewed in clinic today. Postoperative changes securing a left proximal femoral shaft fracture with peripheral mineralization in the soft tissues about the fracture site. There is soft tissue swelling. Hardware appears in place. Intrapelvic IUD..        Impression: 38 year old female  3 weeks  postoperative intramedullary nailing of  left femoral shaft fracture , doing well.    Plan:   Staple removal.  Images reviewed.  Weight bearing status: weight bearing as tolerated   Pain control: over the counter as needed.  Immobilzation: none.  Physical Therapy.  Elevation of affected extremity  Return to clinic in 6 weeks, or sooner as needed.      Jimmie Nino M.D., M.S.  Dept. of Orthopaedic Surgery  [facility]             Again, thank you for allowing me to participate in the care of your patient.        Sincerely,        Jimmie Nino MD

## 2024-02-15 NOTE — PROGRESS NOTES
Clinic Care Coordination Contact  Clinic Care Coordination Contact  OUTREACH      Referral Information:  Referral Source: IP Handoff    Primary Diagnosis: Orthopedic    Chief Complaint   Patient presents with    Clinic Care Coordination - Post Hospital     Clinic Care Coordination RN         Universal Utilization: [facility] admission 1/21-2/1/2024 Fall left femur fracture   [facility] 2/1-2/14/2024  Clinic Utilization  Difficulty keeping appointments:: No  Compliance Concerns: No  No-Show Concerns: No  No PCP office visit in Past Year: No  Utilization      No Show Count (past year)  5             ED Visits  4             Hospital Admissions  0                    Current as of: 2/15/2024  9:58 AM                Clinical Concerns:  Current Medical Concerns:  Patient reports she is in the car with her Mother driving to her Orthopedic follow up appointment.  Patient states hip pain can go up to a #5-6 and is relieved by taking the Oxycodone/  Patient states she doesn't want to take too many Oxycodone  CC RN encouraged patient to drink plenty of water to prevent constipation.  Patient agrees with this plan   Patient is getting around well with a walker  Awaits a call from the home care service.  Patient plans to make a future MTM appointmnt to review her medications     Current Behavioral Concerns: No    Education Provided to patient: CC role introduced    Pain  Pain (GOAL):: No  Health Maintenance Reviewed: Not assessed  Clinical Pathway: None    Medication Management:  Medication review status:   Will review at Orthopedic appointment today and make a future MTM appointment      Functional Status:  Dependent ADLs:: Ambulation-walker  Dependent IADLs:: Transportation  Bed or wheelchair confined:: No  Mobility Status: Independent w/Device    Living Situation:  Current living arrangement:: I live alone    Lifestyle & Psychosocial Needs:    Social Determinants of Health     Food Insecurity: Low  Risk  (12/13/2023)    Food Insecurity     Within the past 12 months, did you worry that your food would run out before you got money to buy more?: No     Within the past 12 months, did the food you bought just not last and you didn t have money to get more?: No   Depression: Not at risk (12/13/2023)    PHQ-2     PHQ-2 Score: 1   Housing Stability: Low Risk  (12/13/2023)    Housing Stability     Do you have housing? : Yes     Are you worried about losing your housing?: No   Tobacco Use: Medium Risk (2/14/2024)    Patient History     Smoking Tobacco Use: Former     Smokeless Tobacco Use: Never     Passive Exposure: Yes   Financial Resource Strain: High Risk (12/13/2023)    Financial Resource Strain     Within the past 12 months, have you or your family members you live with been unable to get utilities (heat, electricity) when it was really needed?: Yes   Alcohol Use: Not on file   Transportation Needs: High Risk (12/13/2023)    Transportation Needs     Within the past 12 months, has lack of transportation kept you from medical appointments, getting your medicines, non-medical meetings or appointments, work, or from getting things that you need?: Yes   Physical Activity: Not on file   Interpersonal Safety: Low Risk  (10/19/2023)    Interpersonal Safety     Do you feel physically and emotionally safe where you currently live?: Yes     Within the past 12 months, have you been hit, slapped, kicked or otherwise physically hurt by someone?: No     Within the past 12 months, have you been humiliated or emotionally abused in other ways by your partner or ex-partner?: No   Stress: Not on file   Social Connections: Not on file     Diet:: Diabetic diet  Inadequate nutrition (GOAL):: No  Tube Feeding: No  Inadequate activity/exercise (GOAL):: No  Significant changes in sleep pattern (GOAL): No  Transportation means:: Family     Buddhist or spiritual beliefs that impact treatment:: No  Mental health DX:: Yes  Mental health DX  how managed:: Medication  Mental health management concern (GOAL):: No  Chemical Dependency Status: No Current Concerns  Informal Support system:: Parent             Resources and Interventions:  Current Resources:   Skilled Home Care Services: Skilled Nursing, Home Health Aid, Physical Therapy, Occupational Therapy  Community Resources: Home Care  Supplies Currently Used at Home: Diabetic Supplies  Equipment Currently Used at Home: walker, rolling  Employment Status: employed part-time         Advance Care Plan/Directive  Advanced Care Plans/Directives on file:: Yes  Type Advanced Care Plans/Directives: Advanced Directive - On File    Referrals Placed: None        Patient/Caregiver understanding: Patient expresses good understanding of discharge instructions        Future Appointments                Today Jimmie Nino MD [facility] [place]    In 5 days Dolly Riley RD [facility]    In 3 weeks Francia Escobar APRN CNP [facility]    In 1 month Shital Roberts NP [facility]            Plan: No unmet needs, no further care coordination is needed at this time   Patient will start home care services     [facility]   Madison Stone RN, Care Coordinator   [facility]   E-mail [email or web address]   [phone]

## 2024-02-15 NOTE — LETTER
February 15, 2024      Divina Delgadillo  [address] [address] [address]        To Whom It May Concern:    Divina Delgadillo was seen in our clinic today. No return to work at this time.   We'll reassess in 6 weeks.    Sincerely,            Pb Monroe PA-C, CAQ-OS  Dept. of Orthopedic Surgery  [facility]

## 2024-02-20 ENCOUNTER — ALLIED HEALTH/NURSE VISIT (OUTPATIENT)
Dept: EDUCATION SERVICES | Facility: CLINIC | Age: 39
End: 2024-02-20
Payer: COMMERCIAL

## 2024-02-20 DIAGNOSIS — E11.22 TYPE 2 DIABETES MELLITUS WITH STAGE 3A CHRONIC KIDNEY DISEASE, WITH LONG-TERM CURRENT USE OF INSULIN (H): Primary | ICD-10-CM

## 2024-02-20 DIAGNOSIS — Z79.4 TYPE 2 DIABETES MELLITUS WITH STAGE 3A CHRONIC KIDNEY DISEASE, WITH LONG-TERM CURRENT USE OF INSULIN (H): Primary | ICD-10-CM

## 2024-02-20 DIAGNOSIS — N18.31 TYPE 2 DIABETES MELLITUS WITH STAGE 3A CHRONIC KIDNEY DISEASE, WITH LONG-TERM CURRENT USE OF INSULIN (H): Primary | ICD-10-CM

## 2024-02-20 PROCEDURE — G0108 DIAB MANAGE TRN  PER INDIV: HCPCS | Mod: AE | Performed by: DIETITIAN, REGISTERED

## 2024-02-20 NOTE — PROGRESS NOTES
Diabetes Self-Management Education & Support  Presents for: Insulin Pump Pre-Start  Type of Service: In Person Visit    Insulin Pump Concepts:  Problem solving with insulin pump therapy (BG monitoring; hypoglycemia signs/symptoms, treatment (glucagon) and prevention; hyperglycemia signs/symptoms, treatment and prevention; ketones, DKA signs/symptoms and prevention)  Hands on practice with basic pump button use    Opportunities for ongoing education and support in diabetes-self management were discussed.  Pt verbalized understanding of concepts discussed and recommendations provided today.          ASSESSMENT:  Met with Divina and her mom to review the infusion sets, reservoir filling and Guardian 4 sensor insertion.  These are the most technical parts of the pump and wanted patient to have hands on experience before starting it with these.    Ran through 2 reservoir fill and changes, highlighting the directions on the pump of rewinding, loading, filling and fill cannula stressing when it can safely be attached to the body.  Divina was able to complete the Mark advance infusion set insertion without issue (this is similar to a dexcom insertion).   Practiced the Guardian 4 insertion and taping while following the [email or web address] video.  Talked about charging of the transmitter.     Reviewed automated dosing of what the basal rates are doing to override/under ride insulin.  Reviewed bolusing before meals with entering carbohydrates into the pump.      Discussed the tubing, potential for blocked /kinked tubes and cannulas and the 'once in doubt take it out rule'    Talked about storage of pump supplies in one spot with all parts - will get a large plastic bin to keep everything organized.     Reviewed MDI back up plan with pens and just refilled both long & rapid pens.     Gaurdian 4 insertion on arm is hard to do alone and talked about options.   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  Her mom /sister could help with  "sensor and has a home care nurse once every 2 weeks who just helped with the dexcom     PLAN  Divina will go home and organize pump supplies into one spot to keep them organized   Review the book for insertion of sensor.  (Ignore quickset instructions).  Printed out Mark Advance instructions.   Will coordinate with [employer] trainers for ongoing pre pump & pump start education   Dislikes glucose tabs (but will eat) - recommend getting shelf stable juice boxes for purse.     SUBJECTIVE/OBJECTIVE:  Presents for: Insulin Pump Pre-Start  Accompanied by: Self, Mother  Diabetes education in the past 24mo: Yes  Focus of Visit: Insulin Pump  Type of Pump visit: Pre-pump  Diabetes type: Type 2  Disease course: Improving  How confident are you filling out medical forms by yourself:: Quite a bit  Other concerns:: None  Cultural Influences/Ethnic Background:  Not  or     Diabetes Symptoms & Complications:  Disease course: Improving  Nephropathy: Yes  Peripheral neuropathy: Yes    Patient Problem List and Family Medical History reviewed for relevant medical history, current medical status, and diabetes risk factors.    Vitals:  There were no vitals taken for this visit.  Estimated body mass index is 42.14 kg/m  as calculated from the following:    Height as of 2/15/24: 1.575 m (5' 2\").    Weight as of 2/13/24: 104.5 kg (230 lb 6.4 oz).   Last 3 BP:   BP Readings from Last 3 Encounters:   02/15/24 120/74   02/13/24 (!) 156/67   02/05/24 129/57       History   Smoking Status    Former    Packs/day: 0.50    Types: Cigarettes    Start date: 11/27/2023   Smokeless Tobacco    Never       Labs:  Lab Results   Component Value Date    A1C 8.3 09/13/2023    A1C 7.4 07/08/2021     Lab Results   Component Value Date     02/07/2024     09/18/2023     12/19/2022     07/08/2021     Lab Results   Component Value Date    LDL 81 12/21/2022    LDL 72 03/26/2021     HDL Cholesterol   Date Value Ref Range " "Status   03/26/2021 57 >49 mg/dL Final     Direct Measure HDL   Date Value Ref Range Status   12/21/2022 72 >=50 mg/dL Final   ]  GFR Estimate   Date Value Ref Range Status   02/07/2024 59 (L) >60 mL/min/1.73m2 Final   07/08/2021 52 (L) >60 mL/min/[1.73_m2] Final     Comment:     Non  GFR Calc  Starting 12/18/2018, serum creatinine based estimated GFR (eGFR) will be   calculated using the Chronic Kidney Disease Epidemiology Collaboration   (CKD-EPI) equation.       GFR Estimate If Black   Date Value Ref Range Status   07/08/2021 60 (L) >60 mL/min/[1.73_m2] Final     Comment:      GFR Calc  Starting 12/18/2018, serum creatinine based estimated GFR (eGFR) will be   calculated using the Chronic Kidney Disease Epidemiology Collaboration   (CKD-EPI) equation.       Lab Results   Component Value Date    CR 1.20 02/07/2024    CR 1.32 07/08/2021     No results found for: \"MICROALBUMIN\"    Healthy Eating:  Healthy Eating Assessed Today: Yes  Cultural/Anglican diet restrictions?: No  Meal planning/habits: Carb counting  How many times a week on average do you eat food made away from home (restaurant/take-out)?: 3    Being Active:  Being Active Assessed Today: Yes  Barrier to exercise: Physical limitation    Monitoring:  Monitoring Assessed Today: Yes  Blood Glucose Meter: CGM  Times checking blood sugar at home (number): 5+  Times checking blood sugar at home (per): Day  Blood glucose trend: Other    Taking Medications:  Diabetes Medication(s)       Diabetic Other       glucose (BD GLUCOSE) 4 g chewable tablet Take 1 tablet by mouth every hour as needed for low blood sugar       Insulin       Insulin Aspart FlexPen 100 UNIT/ML SOPN Inject 1-14 Units Subcutaneous 3 times daily (before meals) Inject 3 times daily with meals as instructed.  Max TDD 30.     insulin glargine (LANTUS SOLOSTAR) 100 UNIT/ML pen Inject 32 Units Subcutaneous every morning          Current Treatments: Insulin " Injections  Dose schedule: Pre-breakfast, Pre-lunch, Pre-dinner  Given by: Patient    Problem Solving:  Yes      Reducing Risks:  Reducing Risks Assessed Today: Yes  CAD Risks: Diabetes Mellitus, Family history, Hypertension, Stress  Has dilated eye exam at least once a year?: No  Sees dentist every 6 months?: No  Feet checked by healthcare provider in the last year?: No    Healthy Coping:  Healthy Coping Assessed Today: Yes  Emotional response to diabetes: Ready to learn  Informal Support system:: Family, Other, Brenda based  Stage of change: ACTION (Actively working towards change)  Patient Activation Measure Survey Score:      5/10/2018     1:00 PM   EMY Score (Last Two)   EMY Raw Score 35   Activation Score 72.1   EMY Level 3     Care Plan and Education Provided:  Care Plan: Diabetes   Updates made by Dolly Riley RD since 2/20/2024 12:00 AM        Problem: Diabetes Self-Management Education Needed to Optimize Self-Care Behaviors         Goal: Taking Medication - patient is consistently taking medications as directed    Note:    Pre pump review          Dolly Riley RD, LD, CDCES  Diabetes Education    Time Spent: 120 minutes  Encounter Type: Individual

## 2024-02-20 NOTE — Clinical Note
2/20/2024         RE: Divina Delgadillo  [id number] [address] [address]        Dear Colleague,    Thank you for referring your patient, Divina Delgadillo, to the [facility] [place]. Please see a copy of my visit note below.    Diabetes Self-Management Education & Support  Presents for: Insulin Pump Pre-Start  Type of Service: In Person Visit    Insulin Pump Concepts:  Problem solving with insulin pump therapy (BG monitoring; hypoglycemia signs/symptoms, treatment (glucagon) and prevention; hyperglycemia signs/symptoms, treatment and prevention; ketones, DKA signs/symptoms and prevention)  Hands on practice with basic pump button use    Opportunities for ongoing education and support in diabetes-self management were discussed.  Pt verbalized understanding of concepts discussed and recommendations provided today.          ASSESSMENT:  Met with Divina and her mom to review the infusion sets, reservoir filling and Guardian 4 sensor insertion.  These are the most technical parts of the pump and wanted patient to have hands on experience before starting it with these.    Ran through 2 reservoir fill and changes, highlighting the directions on the pump of rewinding, loading, filling and fill cannula stressing when it can safely be attached to the body.  Divina was able to complete the Mark advance infusion set insertion without issue (this is similar to a dexcom insertion).   Practiced the Gaurdian 4 insertion and taping while following the [employer] video.  Talked about charging of the transmitter.     Reviewed automated dosing of what the basal rates are doing to override/under ride insulin.  Reviewed bolusing before meals with entering carbohydrates into the pump.      Discussed the tubing, potential for blocked /kinked tubes and cannulas and the 'once in doubt take it out rule'    Talked about storage of pump supplies in one spot with all parts - will get a large plastic bin to keep  "everything organized.     Reviewed MDI back up plan with pens and just refilled both long & rapid pens.     Lisa Fabian insertion on arm is hard to do alone and talked about options.   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  Her mom /sister could help with sensor and has a home care nurse once every 2 weeks who just helped with the dexcom     PLAN  Divina will go home and organize pump supplies into one spot to keep them organized   Review the book for insertion of sensor.  (Ignore quickset instructions).  Printed out Mark Advance instructions.   Will coordinate with [employer] trainers for ongoing pre pump & pump start education   Dislikes glucose tabs (but will eat) - recommend getting shelf stable juice boxes for purse.     SUBJECTIVE/OBJECTIVE:  Presents for: Insulin Pump Pre-Start  Accompanied by: Self, Mother  Diabetes education in the past 24mo: Yes  Focus of Visit: Insulin Pump  Type of Pump visit: Pre-pump  Diabetes type: Type 2  Disease course: Improving  How confident are you filling out medical forms by yourself:: Quite a bit  Other concerns:: None  Cultural Influences/Ethnic Background:  Not  or     Diabetes Symptoms & Complications:  Disease course: Improving  Nephropathy: Yes  Peripheral neuropathy: Yes    Patient Problem List and Family Medical History reviewed for relevant medical history, current medical status, and diabetes risk factors.    Vitals:  There were no vitals taken for this visit.  Estimated body mass index is 42.14 kg/m  as calculated from the following:    Height as of 2/15/24: 1.575 m (5' 2\").    Weight as of 2/13/24: 104.5 kg (230 lb 6.4 oz).   Last 3 BP:   BP Readings from Last 3 Encounters:   02/15/24 120/74   02/13/24 (!) 156/67   02/05/24 129/57       History   Smoking Status     Former     Packs/day: 0.50     Types: Cigarettes     Start date: 11/27/2023   Smokeless Tobacco     Never       Labs:  Lab Results   Component Value Date    A1C 8.3 09/13/2023    A1C " "7.4 07/08/2021     Lab Results   Component Value Date     02/07/2024     09/18/2023     12/19/2022     07/08/2021     Lab Results   Component Value Date    LDL 81 12/21/2022    LDL 72 03/26/2021     HDL Cholesterol   Date Value Ref Range Status   03/26/2021 57 >49 mg/dL Final     Direct Measure HDL   Date Value Ref Range Status   12/21/2022 72 >=50 mg/dL Final   ]  GFR Estimate   Date Value Ref Range Status   02/07/2024 59 (L) >60 mL/min/1.73m2 Final   07/08/2021 52 (L) >60 mL/min/[1.73_m2] Final     Comment:     Non  GFR Calc  Starting 12/18/2018, serum creatinine based estimated GFR (eGFR) will be   calculated using the Chronic Kidney Disease Epidemiology Collaboration   (CKD-EPI) equation.       GFR Estimate If Black   Date Value Ref Range Status   07/08/2021 60 (L) >60 mL/min/[1.73_m2] Final     Comment:      GFR Calc  Starting 12/18/2018, serum creatinine based estimated GFR (eGFR) will be   calculated using the Chronic Kidney Disease Epidemiology Collaboration   (CKD-EPI) equation.       Lab Results   Component Value Date    CR 1.20 02/07/2024    CR 1.32 07/08/2021     No results found for: \"MICROALBUMIN\"    Healthy Eating:  Healthy Eating Assessed Today: Yes  Cultural/Quaker diet restrictions?: No  Meal planning/habits: Carb counting  How many times a week on average do you eat food made away from home (restaurant/take-out)?: 3    Being Active:  Being Active Assessed Today: Yes  Barrier to exercise: Physical limitation    Monitoring:  Monitoring Assessed Today: Yes  Blood Glucose Meter: CGM  Times checking blood sugar at home (number): 5+  Times checking blood sugar at home (per): Day  Blood glucose trend: Other    Taking Medications:  Diabetes Medication(s)       Diabetic Other       glucose (BD GLUCOSE) 4 g chewable tablet Take 1 tablet by mouth every hour as needed for low blood sugar       Insulin       Insulin Aspart FlexPen 100 UNIT/ML SOPN " Inject 1-14 Units Subcutaneous 3 times daily (before meals) Inject 3 times daily with meals as instructed.  Max TDD 30.     insulin glargine (LANTUS SOLOSTAR) 100 UNIT/ML pen Inject 32 Units Subcutaneous every morning          Current Treatments: Insulin Injections  Dose schedule: Pre-breakfast, Pre-lunch, Pre-dinner  Given by: Patient    Problem Solving:  Yes      Reducing Risks:  Reducing Risks Assessed Today: Yes  CAD Risks: Diabetes Mellitus, Family history, Hypertension, Stress  Has dilated eye exam at least once a year?: No  Sees dentist every 6 months?: No  Feet checked by healthcare provider in the last year?: No    Healthy Coping:  Healthy Coping Assessed Today: Yes  Emotional response to diabetes: Ready to learn  Informal Support system:: Family, Other, Brenda based  Stage of change: ACTION (Actively working towards change)  Patient Activation Measure Survey Score:      5/10/2018     1:00 PM   EMY Score (Last Two)   EMY Raw Score 35   Activation Score 72.1   EMY Level 3     Care Plan and Education Provided:  Care Plan: Diabetes   Updates made by Dolly Riley RD since 2/20/2024 12:00 AM        Problem: Diabetes Self-Management Education Needed to Optimize Self-Care Behaviors         Goal: Taking Medication - patient is consistently taking medications as directed    Note:    Pre pump review          Dolly Riley RD, LD, CDCES  Diabetes Education    Time Spent: 120 minutes  Encounter Type: Individual

## 2024-02-22 ENCOUNTER — THERAPY VISIT (OUTPATIENT)
Dept: PHYSICAL THERAPY | Facility: CLINIC | Age: 39
End: 2024-02-22
Attending: PHYSICIAN ASSISTANT
Payer: COMMERCIAL

## 2024-02-22 DIAGNOSIS — S72.302A: ICD-10-CM

## 2024-02-22 PROCEDURE — 97110 THERAPEUTIC EXERCISES: CPT | Mod: GP | Performed by: PHYSICAL THERAPIST

## 2024-02-22 PROCEDURE — 97161 PT EVAL LOW COMPLEX 20 MIN: CPT | Mod: GP | Performed by: PHYSICAL THERAPIST

## 2024-02-22 NOTE — PROGRESS NOTES
PHYSICAL THERAPY EVALUATION  Type of Visit: Evaluation    See electronic medical record for Abuse and Falls Screening details.    Subjective       Presenting condition or subjective complaint:  Femur fracture. DOI 1/21/24, 4.5 wk s/p.Patient notes [place]. She was wearing a walking boot and it got caught and she went flying in the air and landed on her left side. She was taken to a hospital in [place] She had a left fixation of proximal femur and long antegrade nailing. Had PT at local TCU. Currently only doing walking with walker.     Date of onset: 02/15/24    Relevant medical history:     Dates & types of surgery:      Prior diagnostic imaging/testing results:       Prior therapy history for the same diagnosis, illness or injury:        Prior Level of Function  Transfers: Independent  Ambulation: Independent  ADL: Independent      Living Environment  Social support:   apartment, no stairs, help with cooking/cleaning, finances  Type of home:     Stairs to enter the home:         Ramp:     Stairs inside the home:         Help at home:    Equipment owned:   SEC, walking    Employment:    dietary aid at [employer]  Hobbies/Interests:  hanging out with friends/family    Patient goals for therapy:  riding a bike, walk better, play with cat    Pain assessment: Pain present  Location: L hip/Rating: 3/10     Objective   HIP EVALUATION  PAIN: Pain Level at Rest: 2/10  Pain Level with Use: 10/10  Pain Location: hip  Pain Quality: Aching  Pain Frequency: intermittent or lots of stiffness in L lateral thigh  Pain is Worst: daytime  Pain is Exacerbated By: prolonged standing, stairs, sitting in uncomfortable chairs  INTEGUMENTARY (edema, incisions):  swelling in LLE, some redness at incision, educated patient on monitoring  POSTURE: WNL  GAIT:   Weightbearing Status: WBAT  Assistive Device(s): Cane (single end), Walker (front wheeled)  Gait Deviations: Antalgic  Stance time  decreased  Stride length decreased  Gwendolyn decreased  BALANCE/PROPRIOCEPTION:  poor RLE balance, unable to just stand on LLE  ROM:  limited 50% L hip flexion/abduction/adduction/extension with pain at end range  STRENGTH:  2-3/5 LLE with pain at end ranges,   PALPATION:  TTP L glutes, quad, TFL  JOINT MOBILITY: very guarded    Assessment & Plan   CLINICAL IMPRESSIONS  Medical Diagnosis: Closed fracture of shaft of left femur, initial encounter (H)    Treatment Diagnosis: L hip weakness and limited ROM   Impression/Assessment: Patient is a 38 year old female with L hip s/p femur fixation complaints.  The following significant findings have been identified: Pain, Decreased ROM/flexibility, Decreased joint mobility, Decreased strength, Impaired gait, Impaired muscle performance, and Decreased activity tolerance. These impairments interfere with their ability to perform self care tasks, work tasks, recreational activities, household chores, driving , household mobility, and community mobility as compared to previous level of function.     Clinical Decision Making (Complexity):  Clinical Presentation: Stable/Uncomplicated  Clinical Presentation Rationale: based on medical and personal factors listed in PT evaluation  Clinical Decision Making (Complexity): Low complexity    PLAN OF CARE  Treatment Interventions:  Interventions: Gait Training, Manual Therapy, Neuromuscular Re-education, Therapeutic Activity, Therapeutic Exercise, Self-Care/Home Management    Long Term Goals     PT Goal 1  Goal Identifier: 1.  Goal Description: Patient will ambulate in home and community without pain and AD in order to increase independence.  Target Date: 04/04/24  PT Goal 2  Goal Identifier: 2.  Goal Description: Patient will demonstrate TUG <13 seconds without AD in order to reduce risk for falls.  Target Date: 04/18/24  PT Goal 3  Goal Identifier: 3.  Goal Description: Patient will navigate stairs with reciprocal pattern without  pain.  Target Date: 05/02/24  PT Goal 4  Goal Identifier: 4.  Goal Description: Patient will tolerate bending/lifting without pain in order to play with her cat.  Target Date: 05/16/24      Frequency of Treatment: 1x/week weaning to every other week  Duration of Treatment: 12 weeks    Education Assessment:        Risks and benefits of evaluation/treatment have been explained.   Patient/Family/caregiver agrees with Plan of Care.     Evaluation Time:     PT Eval, Low Complexity Minutes (80194): 15     Signing Clinician: CHRISTINE Waters [facility]                                                                                   OUTPATIENT PHYSICAL THERAPY      PLAN OF TREATMENT FOR OUTPATIENT REHABILITATION   Patient's Last Name, First Name, Divina Vaughan YOB: 1985   Provider's Name   [facility]   Medical Record No.  [id number]     Onset Date: 02/15/24  Start of Care Date: 02/22/24     Medical Diagnosis:  Closed fracture of shaft of left femur, initial encounter (H)      PT Treatment Diagnosis:  L hip weakness and limited ROM Plan of Treatment  Frequency/Duration: 1x/week weaning to every other week/ 12 weeks    Certification date from 02/22/24 to 05/16/24         See note for plan of treatment details and functional goals     Courtney Guzman, PT                         I CERTIFY THE NEED FOR THESE SERVICES FURNISHED UNDER        THIS PLAN OF TREATMENT AND WHILE UNDER MY CARE     (Physician attestation of this document indicates review and certification of the therapy plan).              Referring Provider:  Pb Monroe    Initial Assessment  See Epic Evaluation- Start of Care Date: 02/22/24

## 2024-02-23 ENCOUNTER — LAB (OUTPATIENT)
Dept: LAB | Facility: CLINIC | Age: 39
End: 2024-02-23
Payer: COMMERCIAL

## 2024-02-23 DIAGNOSIS — F25.0 SCHIZOAFFECTIVE DISORDER, BIPOLAR TYPE (H): ICD-10-CM

## 2024-02-23 DIAGNOSIS — Z79.899 HIGH RISK MEDICATION USE: ICD-10-CM

## 2024-02-23 LAB
BASOPHILS # BLD AUTO: 0.1 10E3/UL (ref 0–0.2)
BASOPHILS NFR BLD AUTO: 1 %
EOSINOPHIL # BLD AUTO: 0.1 10E3/UL (ref 0–0.7)
EOSINOPHIL NFR BLD AUTO: 1 %
ERYTHROCYTE [DISTWIDTH] IN BLOOD BY AUTOMATED COUNT: 15.9 % (ref 10–15)
HCT VFR BLD AUTO: 37.6 % (ref 35–47)
HGB BLD-MCNC: 11.1 G/DL (ref 11.7–15.7)
IMM GRANULOCYTES # BLD: 0.1 10E3/UL
IMM GRANULOCYTES NFR BLD: 1 %
LYMPHOCYTES # BLD AUTO: 3.1 10E3/UL (ref 0.8–5.3)
LYMPHOCYTES NFR BLD AUTO: 22 %
MCH RBC QN AUTO: 28.5 PG (ref 26.5–33)
MCHC RBC AUTO-ENTMCNC: 29.5 G/DL (ref 31.5–36.5)
MCV RBC AUTO: 96 FL (ref 78–100)
MONOCYTES # BLD AUTO: 0.9 10E3/UL (ref 0–1.3)
MONOCYTES NFR BLD AUTO: 6 %
NEUTROPHILS # BLD AUTO: 10.2 10E3/UL (ref 1.6–8.3)
NEUTROPHILS NFR BLD AUTO: 70 %
PLATELET # BLD AUTO: 438 10E3/UL (ref 150–450)
RBC # BLD AUTO: 3.9 10E6/UL (ref 3.8–5.2)
WBC # BLD AUTO: 14.5 10E3/UL (ref 4–11)

## 2024-02-23 PROCEDURE — 36415 COLL VENOUS BLD VENIPUNCTURE: CPT

## 2024-02-23 PROCEDURE — 85025 COMPLETE CBC W/AUTO DIFF WBC: CPT

## 2024-02-26 DIAGNOSIS — R60.0 LOWER EXTREMITY EDEMA: ICD-10-CM

## 2024-02-26 RX ORDER — BUMETANIDE 0.5 MG/1
0.5 TABLET ORAL DAILY
Qty: 30 TABLET | Refills: 2 | Status: SHIPPED | OUTPATIENT
Start: 2024-02-26 | End: 2024-05-28

## 2024-02-26 NOTE — TELEPHONE ENCOUNTER
Nephrology Note: Medication Refill Request    Medication Refill Request:     Medication/Dose/Frequency:   Preferred Pharmacy:   Provider:                           SITUATION/BACKROUND:                 Last office visit: 12/11/23        Future office visit: none     ASSESSMENT:     Neph Assessments:    Recent Labs:  CBC Results:  Recent Labs   Lab Test 02/23/24  1108   WBC 14.5*   RBC 3.90   HGB 11.1*   HCT 37.6   MCV 96   MCH 28.5   MCHC 29.5*   RDW 15.9*        Last Renal Panel:  Sodium   Date Value Ref Range Status   02/07/2024 139 135 - 145 mmol/L Final     Comment:     Reference intervals for this test were updated on 09/26/2023 to more accurately reflect our healthy population. There may be differences in the flagging of prior results with similar values performed with this method. Interpretation of those prior results can be made in the context of the updated reference intervals.    07/08/2021 139 133 - 144 mmol/L Final     Potassium   Date Value Ref Range Status   02/07/2024 4.6 3.4 - 5.3 mmol/L Final   12/19/2022 5.1 3.4 - 5.3 mmol/L Final   07/08/2021 4.4 3.4 - 5.3 mmol/L Final     Chloride   Date Value Ref Range Status   02/07/2024 108 (H) 98 - 107 mmol/L Final   12/19/2022 112 (H) 94 - 109 mmol/L Final   07/08/2021 108 94 - 109 mmol/L Final     Carbon Dioxide   Date Value Ref Range Status   07/08/2021 26 20 - 32 mmol/L Final     Carbon Dioxide (CO2)   Date Value Ref Range Status   02/07/2024 17 (L) 22 - 29 mmol/L Final   12/19/2022 26 20 - 32 mmol/L Final     Anion Gap   Date Value Ref Range Status   02/07/2024 14 7 - 15 mmol/L Final   12/19/2022 3 3 - 14 mmol/L Final   07/08/2021 5 3 - 14 mmol/L Final     Glucose   Date Value Ref Range Status   02/07/2024 232 (H) 70 - 99 mg/dL Final   12/19/2022 258 (H) 70 - 99 mg/dL Final   07/08/2021 187 (H) 70 - 99 mg/dL Final     GLUCOSE BY METER POCT   Date Value Ref Range Status   09/18/2023 121 (H) 70 - 99 mg/dL Final     Urea Nitrogen   Date Value Ref  Range Status   02/07/2024 18.9 6.0 - 20.0 mg/dL Final   12/19/2022 19 7 - 30 mg/dL Final   07/08/2021 22 7 - 30 mg/dL Final     Creatinine   Date Value Ref Range Status   02/07/2024 1.20 (H) 0.51 - 0.95 mg/dL Final   07/08/2021 1.32 (H) 0.52 - 1.04 mg/dL Final     GFR Estimate   Date Value Ref Range Status   02/07/2024 59 (L) >60 mL/min/1.73m2 Final   07/08/2021 52 (L) >60 mL/min/[1.73_m2] Final     Comment:     Non  GFR Calc  Starting 12/18/2018, serum creatinine based estimated GFR (eGFR) will be   calculated using the Chronic Kidney Disease Epidemiology Collaboration   (CKD-EPI) equation.       Calcium   Date Value Ref Range Status   02/07/2024 9.0 8.6 - 10.0 mg/dL Final   07/08/2021 9.2 8.5 - 10.1 mg/dL Final     Phosphorus   Date Value Ref Range Status   12/11/2023 2.4 (L) 2.5 - 4.5 mg/dL Final   06/15/2021 3.2 2.5 - 4.5 mg/dL Final     Albumin   Date Value Ref Range Status   12/11/2023 3.8 3.5 - 5.2 g/dL Final   12/19/2022 3.0 (L) 3.4 - 5.0 g/dL Final   07/08/2021 3.4 3.4 - 5.0 g/dL Final       Current Medication List:   Current Outpatient Medications (Antihypertensive, Cardiovascular, Diuretics, Beta blockers, Calcium blockers, Anticoagulants)   Medication Sig    bumetanide (BUMEX) 0.5 MG tablet Take 1 tablet (0.5 mg) by mouth daily    carvedilol (COREG) 12.5 MG tablet Take 1 tablet (12.5 mg) by mouth 2 times daily     Current Outpatient Medications (Other)   Medication Sig    ACCU-CHEK GUIDE test strip Use to test blood sugar 3 times daily or as directed.    acetaminophen (TYLENOL) 325 MG tablet Take 650 mg by mouth every 4 hours as needed for pain or fever 2 tab every 4-6 hours as needed, do not exceed 9 tabs in 24hours    albuterol (VENTOLIN HFA) 108 (90 Base) MCG/ACT inhaler INHALE 2 PUFFS INTO THE LUNGS EVERY SIX  HOURS AS NEEDED FOR SHORTNESS OF BREATH    ARIPiprazole (ABILIFY) 30 MG tablet Take 30 mg by mouth daily    atorvastatin (LIPITOR) 10 MG tablet Take 1 tablet (10 mg) by mouth  "daily    cloZAPine (CLOZARIL) 100 MG tablet Take 100 mg by mouth One in the morning, 2 at night    cloZAPine (CLOZARIL) 50 MG tablet 50 mg daily    COMPOUNDED NON-CONTROLLED SUBSTANCE (CMPD RX) - PHARMACY TO MIX COMPOUNDED MEDICATION Chloramphenicol 50 mg, sulfamethoxazole 50 mg, amphotericin B 5 mg, hydrocortisone 1 mg. 2-3 puffs to affected ear PRN itching/drainage    Continuous Blood Gluc Sensor (DEXCOM G7 SENSOR) MISC 1 Device every 10 days    fluocinolone acetonide 0.01 % OIL Place 4 drops in ear(s) 2 times daily as needed (ear itching)    FLUoxetine (PROZAC) 40 MG capsule Take 40 mg by mouth daily    glucose (BD GLUCOSE) 4 g chewable tablet Take 1 tablet by mouth every hour as needed for low blood sugar    Insulin Aspart FlexPen 100 UNIT/ML SOPN Inject 1-14 Units Subcutaneous 3 times daily (before meals) Inject 3 times daily with meals as instructed.  Max TDD 30.    insulin glargine (LANTUS SOLOSTAR) 100 UNIT/ML pen Inject 32 Units Subcutaneous every morning    Insulin Infusion Pump (MINIMED 780G INSULIN PUMP) KIT 1 each daily SmartGuard Target: 100; Active Insulin Time: 2 hours (recommended); SmartGuardTM Warmup/Manual Mode: Suspend before low (recommended)    Insulin Infusion Pump Supplies (EXTENDED INFUSION SET 23\"/6MM) MISC 1 each every 7 days Max Total Daily Dose 70 units; Change infusion set & reservoir every 7 days (recommended)    Insulin Infusion Pump Supplies (EXTENDED RESERVOIR 3ML) MISC 1 each every 7 days    insulin pen needle (32G X 6 MM) 32G X 6 MM miscellaneous Use 4 pen needles daily.    insulin pen needle (ULTICARE MINI) 31G X 6 MM miscellaneous USE 1 PEN NEEDLE 4 times daily    lamoTRIgine (LAMICTAL) 100 MG tablet Take 100 mg by mouth At Bedtime    omeprazole (PRILOSEC) 20 MG DR capsule TAKE 1 CAPSULE BY MOUTH EVERY DAY    oxyCODONE (ROXICODONE) 5 MG tablet Take 1 tablet (5 mg) by mouth 3 times daily as needed for severe pain    topiramate (TOPAMAX) 50 MG tablet Take 1 tablet (50 mg) by " mouth 2 times daily    traZODone (DESYREL) 50 MG tablet 50 mg as needed       Has patient had kidney transplant in the prior 1 year: no.   Has patient been seen the last 12 months: Yes.  Associated labs reviewed for medication: Yes    PLAN:     Medication refilled per protocol: Yes    SCOTT CHILDS RN

## 2024-02-28 ENCOUNTER — TELEPHONE (OUTPATIENT)
Dept: FAMILY MEDICINE | Facility: CLINIC | Age: 39
End: 2024-02-28
Payer: COMMERCIAL

## 2024-02-28 ENCOUNTER — TELEPHONE (OUTPATIENT)
Dept: PODIATRY | Facility: CLINIC | Age: 39
End: 2024-02-28
Payer: COMMERCIAL

## 2024-02-28 NOTE — TELEPHONE ENCOUNTER
Medication Question or Refill        What medication are you calling about (include dose and sig)?: alendronate 70 mg once weekly    Preferred Pharmacy:   [facility] [address] [address] [address]  Phone: [phone] Fax: [phone]      Controlled Substance Agreement on file:   CSA -- Patient Level:    CSA: None found at the patient level.         Do you need a refill? Yes    Patient offered an appointment? No    Do you have any questions or concerns?  Yes: Pt currently takes 70 mg once weekly of alendronate, requesting a prescription be sent to preferred pharmacy if PCP would like pt to continue to take      Could we send this information to you in [facility] or would you prefer to receive a phone call?:   Patient would prefer a phone call   Okay to leave a detailed message?: Yes at Home number on file [phone] (home)

## 2024-02-29 NOTE — TELEPHONE ENCOUNTER
Who is prescribing this for patient? She does not have history of osteoporosis, I am not sure why she needs to take Fosamax.    VERONIQUE Spears CNP

## 2024-02-29 NOTE — TELEPHONE ENCOUNTER
Called pt. She states she thinks ortho was prescribing due to femur fx. Adv pt to contact ortho to see if she needs to continue. Pt will call      Jose De Jesus Guido RN

## 2024-03-01 ENCOUNTER — VIRTUAL VISIT (OUTPATIENT)
Dept: EDUCATION SERVICES | Facility: CLINIC | Age: 39
End: 2024-03-01
Payer: COMMERCIAL

## 2024-03-01 DIAGNOSIS — Z79.4 TYPE 2 DIABETES MELLITUS WITH STAGE 3A CHRONIC KIDNEY DISEASE, WITH LONG-TERM CURRENT USE OF INSULIN (H): Primary | ICD-10-CM

## 2024-03-01 DIAGNOSIS — N18.31 TYPE 2 DIABETES MELLITUS WITH STAGE 3A CHRONIC KIDNEY DISEASE, WITH LONG-TERM CURRENT USE OF INSULIN (H): Primary | ICD-10-CM

## 2024-03-01 DIAGNOSIS — E11.22 TYPE 2 DIABETES MELLITUS WITH STAGE 3A CHRONIC KIDNEY DISEASE, WITH LONG-TERM CURRENT USE OF INSULIN (H): Primary | ICD-10-CM

## 2024-03-01 PROCEDURE — G0108 DIAB MANAGE TRN  PER INDIV: HCPCS | Mod: 93 | Performed by: DIETITIAN, REGISTERED

## 2024-03-01 NOTE — LETTER
3/1/2024         RE: Divina Delgadillo  [id number] [facility] [address]        Dear Colleague,    Thank you for referring your patient, Divina Delgadillo, to the [facility] [place]. Please see a copy of my visit note below.    Diabetes Self-Management Education & Support  Presents for: Insulin Pump Pre-Start  Type of Service: Telephone Visit  Originating Location (Patient Location): Home  Distant Location (Provider Location): Offsite  Mode of Communication:  Telephone  Telephone Visit Start Time:  1030  Telephone Visit End Time (telephone visit stop time): 1110      ASSESSMENT:  Continued pre pump education.  Divina met with Galina from [employer] on Wedneday to review the pump button pushing, and do additional sets and reservoir changes.  Divina said it went very well and she did it all on her own. Scheduled today to review carbohydrate counting and carbohydrate awareness in prep for bolusing and using the meal bolus wizard.  Reviewed diet in detail and practiced label reading with foods in house.     Schedule / food notes:   Gets up and takes pills with water  Waits 15 minutes and lets that settle   9am: Makes a parfait - honey, greek yogurt, blueberries, strawberries, granola  (5 units)  10grams, two good 3grams, 10gram, 15  (40 grams of carbohydrate)   Cereal - keto friendtly (3 grams sugar), 9 grams carbohydrate, 10 grams protein, 3 grams fiber  + milk (fairlife)   (15 grams)   Cheerios + banans + fairlife:  67grams   Lunch - sometimes skips   Snacks in middle of the day   [place]: 30 grams   brownberry thin slices of bread   Serving size is one slice  -  80,   12 grams, 24 for a [facility]   Peanut butter and jelly  - 40 grams   Pretzels  - small baggie  1/2 cup is a serving  - (small or big, 15 or 30)   Dinner: normally 7 units   Medium banana: 20 grams carbohydrate   Macandcheese - usually 1.5 cups  Peaches pears apples bluverries, stawberries   Canned mixed fruit  - look  at   Small apple 15   Blueberries - loves forzen and fresh.    1 cup: 22 grams   Augratin potatoes   Rice   Noodles  Tater tots in air fryer   Chicken   Big salad last night for dinner: lettuces, carrots, avocado, croutons, cheese, cottage cheese, semsame seeds, dressing - western   Dinner at friends house - spaghetti with tons of veggies + beef  Milk only in cereal   Diet pop   Juice only for lows or pills       Patient's most recent   Lab Results   Component Value Date    A1C 8.3 09/13/2023    A1C 7.4 07/08/2021     is not meeting goal of <7.0    Diabetes knowledge and skills assessment:   Patient is knowledgeable in diabetes management concepts related to: Healthy Eating, Being Active, Monitoring, Taking Medication, Problem Solving, Reducing Risks, Healthy Coping, and Insulin Pump Concepts Balancing glucose and insulin  Carbohydrate counting  Calculating boluses  Problem solving with insulin pump therapy (BG monitoring; hypoglycemia signs/symptoms, treatment (glucagon) and prevention; hyperglycemia signs/symptoms, treatment and prevention; ketones, DKA signs/symptoms and prevention)  Hands on practice with basic pump button use  Continue education with the following diabetes management concepts: pump follow up, carbohydrate counting, medication adjustment, risk reduction, long term management   Based on learning assessment above, most appropriate setting for further diabetes education would be: Individual setting.      PLAN  Write down any regular foods / meals over the weekend   Continue practicing carbohydrate counting / carbohydrate awareness   Follow up on Tuesday for pump start, hold lantus morning of    SUBJECTIVE/OBJECTIVE:  Presents for: Insulin Pump Pre-Start  Accompanied by: Self  Diabetes education in the past 24mo: Yes  Focus of Visit: Insulin Pump  Type of Pump visit: Pre-pump  Diabetes type: Type 2  Disease course: Improving  How confident are you filling out medical forms by yourself:: Quite a  "bit  Other concerns:: None  Cultural Influences/Ethnic Background:  Not  or     Diabetes Symptoms & Complications:  Disease course: Improving  Nephropathy: Yes  Peripheral neuropathy: Yes    Patient Problem List and Family Medical History reviewed for relevant medical history, current medical status, and diabetes risk factors.    Vitals:  There were no vitals taken for this visit.  Estimated body mass index is 42.14 kg/m  as calculated from the following:    Height as of 2/15/24: 1.575 m (5' 2\").    Weight as of 2/13/24: 104.5 kg (230 lb 6.4 oz).   Last 3 BP:   BP Readings from Last 3 Encounters:   02/15/24 120/74   02/13/24 (!) 156/67   02/05/24 129/57       History   Smoking Status     Former     Packs/day: 0.50     Types: Cigarettes     Start date: 11/27/2023   Smokeless Tobacco     Never       Labs:  Lab Results   Component Value Date    A1C 8.3 09/13/2023    A1C 7.4 07/08/2021     Lab Results   Component Value Date     02/07/2024     09/18/2023     12/19/2022     07/08/2021     Lab Results   Component Value Date    LDL 81 12/21/2022    LDL 72 03/26/2021     HDL Cholesterol   Date Value Ref Range Status   03/26/2021 57 >49 mg/dL Final     Direct Measure HDL   Date Value Ref Range Status   12/21/2022 72 >=50 mg/dL Final   ]  GFR Estimate   Date Value Ref Range Status   02/07/2024 59 (L) >60 mL/min/1.73m2 Final   07/08/2021 52 (L) >60 mL/min/[1.73_m2] Final     Comment:     Non  GFR Calc  Starting 12/18/2018, serum creatinine based estimated GFR (eGFR) will be   calculated using the Chronic Kidney Disease Epidemiology Collaboration   (CKD-EPI) equation.       GFR Estimate If Black   Date Value Ref Range Status   07/08/2021 60 (L) >60 mL/min/[1.73_m2] Final     Comment:      GFR Calc  Starting 12/18/2018, serum creatinine based estimated GFR (eGFR) will be   calculated using the Chronic Kidney Disease Epidemiology Collaboration   (CKD-EPI) " "equation.       Lab Results   Component Value Date    CR 1.20 02/07/2024    CR 1.32 07/08/2021     No results found for: \"MICROALBUMIN\"    Healthy Eating:  Healthy Eating Assessed Today: Yes  Cultural/Shinto diet restrictions?: No  Meal planning/habits: Carb counting  How many times a week on average do you eat food made away from home (restaurant/take-out)?: 3    Being Active:  Being Active Assessed Today: Yes  Barrier to exercise: Physical limitation    Monitoring:  Monitoring Assessed Today: Yes  Blood Glucose Meter: CGM  Times checking blood sugar at home (number): 5+  Times checking blood sugar at home (per): Day  Blood glucose trend: Other    Taking Medications:  Diabetes Medication(s)       Diabetic Other       glucose (BD GLUCOSE) 4 g chewable tablet Take 1 tablet by mouth every hour as needed for low blood sugar       Insulin       Insulin Aspart FlexPen 100 UNIT/ML SOPN Inject 1-14 Units Subcutaneous 3 times daily (before meals) Inject 3 times daily with meals as instructed.  Max TDD 30.     insulin glargine (LANTUS SOLOSTAR) 100 UNIT/ML pen Inject 32 Units Subcutaneous every morning            Current Treatments: Insulin Injections  Dose schedule: Pre-breakfast, Pre-lunch, Pre-dinner  Given by: Patient    Problem Solving:  Yes      Reducing Risks:  Reducing Risks Assessed Today: Yes  CAD Risks: Diabetes Mellitus, Family history, Hypertension, Stress  Has dilated eye exam at least once a year?: No  Sees dentist every 6 months?: No  Feet checked by healthcare provider in the last year?: No    Healthy Coping:  Healthy Coping Assessed Today: Yes  Emotional response to diabetes: Ready to learn  Informal Support system:: Family, Other, Brenda based  Stage of change: ACTION (Actively working towards change)  Patient Activation Measure Survey Score:      5/10/2018     1:00 PM   EMY Score (Last Two)   EMY Raw Score 35   Activation Score 72.1   EMY Level 3     Care Plan and Education Provided:  There are no care " plans that you recently modified to display for this patient.    Dolly Riley RD, LD, [facility] [place][note 2]  Diabetes Education    Time Spent: 40 minutes  Encounter Type: Individual

## 2024-03-01 NOTE — PROGRESS NOTES
Diabetes Self-Management Education & Support  Presents for: Insulin Pump Pre-Start  Type of Service: Telephone Visit  Originating Location (Patient Location): Home  Distant Location (Provider Location): Offsite  Mode of Communication:  Telephone  Telephone Visit Start Time:  1030  Telephone Visit End Time (telephone visit stop time): 1110      ASSESSMENT:  Continued pre pump education.  Divina met with Galina from [employer] on Wedneday to review the pump button pushing, and do additional sets and reservoir changes.  Divina said it went very well and she did it all on her own. Scheduled today to review carbohydrate counting and carbohydrate awareness in prep for bolusing and using the meal bolus wizard.  Reviewed diet in detail and practiced label reading with foods in house.     Schedule / food notes:   Gets up and takes pills with water  Waits 15 minutes and lets that settle   9am: Makes a parfait - honey, greek yogurt, blueberries, strawberries, granola  (5 units)  10grams, two good 3grams, 10gram, 15  (40 grams of carbohydrate)   Cereal - keto friendtly (3 grams sugar), 9 grams carbohydrate, 10 grams protein, 3 grams fiber  + milk (fairlife)   (15 grams)   Cheerios + banans + fairlife:  67grams   Lunch - sometimes skips   Snacks in middle of the day   [place]ell: 30 grams   brownberry thin slices of bread   Serving size is one slice  -  80,   12 grams, 24 for a [facility]   Peanut butter and jelly  - 40 grams   Pretzels  - small baggie  1/2 cup is a serving  - (small or big, 15 or 30)   Dinner: normally 7 units   Medium banana: 20 grams carbohydrate   Macandcheese - usually 1.5 cups  Peaches pears apples bluverries, stawberries   Canned mixed fruit  - look at   Small apple 15   Blueberries - loves forzen and fresh.    1 cup: 22 grams   Augratin potatoes   Rice   Noodles  Tater tots in air fryer   Chicken   Big salad last night for dinner: lettuces, carrots, avocado, croutons, cheese, cottage cheese, semsame seeds,  dressing - western   Dinner at friends house - spaghetti with tons of veggies + beef  Milk only in cereal   Diet pop   Juice only for lows or pills       Patient's most recent   Lab Results   Component Value Date    A1C 8.3 09/13/2023    A1C 7.4 07/08/2021     is not meeting goal of <7.0    Diabetes knowledge and skills assessment:   Patient is knowledgeable in diabetes management concepts related to: Healthy Eating, Being Active, Monitoring, Taking Medication, Problem Solving, Reducing Risks, Healthy Coping, and Insulin Pump Concepts Balancing glucose and insulin  Carbohydrate counting  Calculating boluses  Problem solving with insulin pump therapy (BG monitoring; hypoglycemia signs/symptoms, treatment (glucagon) and prevention; hyperglycemia signs/symptoms, treatment and prevention; ketones, DKA signs/symptoms and prevention)  Hands on practice with basic pump button use  Continue education with the following diabetes management concepts: pump follow up, carbohydrate counting, medication adjustment, risk reduction, long term management   Based on learning assessment above, most appropriate setting for further diabetes education would be: Individual setting.      PLAN  Write down any regular foods / meals over the weekend   Continue practicing carbohydrate counting / carbohydrate awareness   Follow up on Tuesday for pump start, hold lantus morning of    SUBJECTIVE/OBJECTIVE:  Presents for: Insulin Pump Pre-Start  Accompanied by: Self  Diabetes education in the past 24mo: Yes  Focus of Visit: Insulin Pump  Type of Pump visit: Pre-pump  Diabetes type: Type 2  Disease course: Improving  How confident are you filling out medical forms by yourself:: Quite a bit  Other concerns:: None  Cultural Influences/Ethnic Background:  Not  or     Diabetes Symptoms & Complications:  Disease course: Improving  Nephropathy: Yes  Peripheral neuropathy: Yes    Patient Problem List and Family Medical History reviewed for  "relevant medical history, current medical status, and diabetes risk factors.    Vitals:  There were no vitals taken for this visit.  Estimated body mass index is 42.14 kg/m  as calculated from the following:    Height as of 2/15/24: 1.575 m (5' 2\").    Weight as of 2/13/24: 104.5 kg (230 lb 6.4 oz).   Last 3 BP:   BP Readings from Last 3 Encounters:   02/15/24 120/74   02/13/24 (!) 156/67   02/05/24 129/57       History   Smoking Status    Former    Packs/day: 0.50    Types: Cigarettes    Start date: 11/27/2023   Smokeless Tobacco    Never       Labs:  Lab Results   Component Value Date    A1C 8.3 09/13/2023    A1C 7.4 07/08/2021     Lab Results   Component Value Date     02/07/2024     09/18/2023     12/19/2022     07/08/2021     Lab Results   Component Value Date    LDL 81 12/21/2022    LDL 72 03/26/2021     HDL Cholesterol   Date Value Ref Range Status   03/26/2021 57 >49 mg/dL Final     Direct Measure HDL   Date Value Ref Range Status   12/21/2022 72 >=50 mg/dL Final   ]  GFR Estimate   Date Value Ref Range Status   02/07/2024 59 (L) >60 mL/min/1.73m2 Final   07/08/2021 52 (L) >60 mL/min/[1.73_m2] Final     Comment:     Non  GFR Calc  Starting 12/18/2018, serum creatinine based estimated GFR (eGFR) will be   calculated using the Chronic Kidney Disease Epidemiology Collaboration   (CKD-EPI) equation.       GFR Estimate If Black   Date Value Ref Range Status   07/08/2021 60 (L) >60 mL/min/[1.73_m2] Final     Comment:      GFR Calc  Starting 12/18/2018, serum creatinine based estimated GFR (eGFR) will be   calculated using the Chronic Kidney Disease Epidemiology Collaboration   (CKD-EPI) equation.       Lab Results   Component Value Date    CR 1.20 02/07/2024    CR 1.32 07/08/2021     No results found for: \"MICROALBUMIN\"    Healthy Eating:  Healthy Eating Assessed Today: Yes  Cultural/Islam diet restrictions?: No  Meal planning/habits: Carb " counting  How many times a week on average do you eat food made away from home (restaurant/take-out)?: 3    Being Active:  Being Active Assessed Today: Yes  Barrier to exercise: Physical limitation    Monitoring:  Monitoring Assessed Today: Yes  Blood Glucose Meter: CGM  Times checking blood sugar at home (number): 5+  Times checking blood sugar at home (per): Day  Blood glucose trend: Other    Taking Medications:  Diabetes Medication(s)       Diabetic Other       glucose (BD GLUCOSE) 4 g chewable tablet Take 1 tablet by mouth every hour as needed for low blood sugar       Insulin       Insulin Aspart FlexPen 100 UNIT/ML SOPN Inject 1-14 Units Subcutaneous 3 times daily (before meals) Inject 3 times daily with meals as instructed.  Max TDD 30.     insulin glargine (LANTUS SOLOSTAR) 100 UNIT/ML pen Inject 32 Units Subcutaneous every morning            Current Treatments: Insulin Injections  Dose schedule: Pre-breakfast, Pre-lunch, Pre-dinner  Given by: Patient    Problem Solving:  Yes      Reducing Risks:  Reducing Risks Assessed Today: Yes  CAD Risks: Diabetes Mellitus, Family history, Hypertension, Stress  Has dilated eye exam at least once a year?: No  Sees dentist every 6 months?: No  Feet checked by healthcare provider in the last year?: No    Healthy Coping:  Healthy Coping Assessed Today: Yes  Emotional response to diabetes: Ready to learn  Informal Support system:: Family, Other, Brenda based  Stage of change: ACTION (Actively working towards change)  Patient Activation Measure Survey Score:      5/10/2018     1:00 PM   EMY Score (Last Two)   EMY Raw Score 35   Activation Score 72.1   EMY Level 3     Care Plan and Education Provided:  There are no care plans that you recently modified to display for this patient.    Dolly Riley RD, DANIEL, [facility]  Diabetes Education    Time Spent: 40 minutes  Encounter Type: Individual

## 2024-03-05 ENCOUNTER — ALLIED HEALTH/NURSE VISIT (OUTPATIENT)
Dept: EDUCATION SERVICES | Facility: CLINIC | Age: 39
End: 2024-03-05
Payer: COMMERCIAL

## 2024-03-05 DIAGNOSIS — N18.31 TYPE 2 DIABETES MELLITUS WITH STAGE 3A CHRONIC KIDNEY DISEASE, WITH LONG-TERM CURRENT USE OF INSULIN (H): Primary | ICD-10-CM

## 2024-03-05 DIAGNOSIS — E11.22 TYPE 2 DIABETES MELLITUS WITH STAGE 3A CHRONIC KIDNEY DISEASE, WITH LONG-TERM CURRENT USE OF INSULIN (H): Primary | ICD-10-CM

## 2024-03-05 DIAGNOSIS — Z79.4 TYPE 2 DIABETES MELLITUS WITH STAGE 3A CHRONIC KIDNEY DISEASE, WITH LONG-TERM CURRENT USE OF INSULIN (H): Primary | ICD-10-CM

## 2024-03-05 PROCEDURE — G0108 DIAB MANAGE TRN  PER INDIV: HCPCS | Performed by: DIETITIAN, REGISTERED

## 2024-03-05 RX ORDER — BLOOD SUGAR DIAGNOSTIC
STRIP MISCELLANEOUS
Qty: 100 STRIP | Refills: 11 | Status: SHIPPED | OUTPATIENT
Start: 2024-03-05

## 2024-03-05 NOTE — PROGRESS NOTES
Diabetes Self-Management Education & Support    Presents for: Insulin Pump and CGM Start    Type of Service: In Person Visit    [employer] 780G insulin pump training:  Last basal insulin dose: 26 hours ago (takes in am)  Last bolus insulin dose:  > 12 hours ago  Blood sugar 125 during pump start    Insulin pump training done by [employer]  - 2.5 hours.  Completed 30 mins DSME within training. Writer observed training and was there to help.     Spent time reviewing carbohydrates, food examples, bolsuing, hypoglycemia prevention, MDI back up plan and ongoing communication.     Patient was able to start insulin pump today without difficulty. Yes     Pump start per orders - Basal and ISF weakened by 10% for safety and hypoglycemia risk reduction.     PLAN:  Follow-up daily with phone calls or [employer] [facility]   Upload to [employer] 2-3 times a day (hit upload button in kwesi)   Ella to call in 24 hours   First sensor and infusion set change to be completed with Ella in person on Friday   Infusion set change with writer on the phone monday   Infusion set change & sensor change with Ella next thurday   Phi 3/17 - first infusion set change alone   3/20 - virtual sensor change with Ella   Continue with positive diet & lifestyle changes     Any diabetes medication dose changes were made via the CDE Protocol and Collaborative Practice Agreement with the patient's referring provider, primary care provider, and endocrinology provider. A copy of this encounter was shared with the provider.    SUBJECTIVE/OBJECTIVE:  Presents for: Insulin Pump and CGM Start  Accompanied by: Self, Mother, Other  Diabetes education in the past 24mo: Yes  Focus of Visit: Insulin Pump  Type of Pump visit: Pump Start  Diabetes type: Type 2  Disease course: Improving  How confident are you filling out medical forms by yourself:: Quite a bit  Other concerns:: None  Cultural Influences/Ethnic Background:  Not  or     Diabetes Symptoms &  "Complications:  Disease course: Improving       Patient Problem List and Family Medical History reviewed for relevant medical history, current medical status, and diabetes risk factors.    Vitals:  There were no vitals taken for this visit.  Estimated body mass index is 39.23 kg/m  as calculated from the following:    Height as of 3/7/24: 1.613 m (5' 3.5\").    Weight as of 3/7/24: 102.1 kg (225 lb).   Last 3 BP:   BP Readings from Last 3 Encounters:   02/15/24 120/74   02/13/24 (!) 156/67   02/05/24 129/57       History   Smoking Status    Former    Packs/day: 0.50    Types: Cigarettes    Start date: 11/27/2023    Quit date: 2/12/2024   Smokeless Tobacco    Never       Labs:  Lab Results   Component Value Date    A1C 8.3 09/13/2023    A1C 7.4 07/08/2021     Lab Results   Component Value Date     02/07/2024     09/18/2023     12/19/2022     07/08/2021     Lab Results   Component Value Date    LDL 81 12/21/2022    LDL 72 03/26/2021     HDL Cholesterol   Date Value Ref Range Status   03/26/2021 57 >49 mg/dL Final     Direct Measure HDL   Date Value Ref Range Status   12/21/2022 72 >=50 mg/dL Final   ]  GFR Estimate   Date Value Ref Range Status   02/07/2024 59 (L) >60 mL/min/1.73m2 Final   07/08/2021 52 (L) >60 mL/min/[1.73_m2] Final     Comment:     Non  GFR Calc  Starting 12/18/2018, serum creatinine based estimated GFR (eGFR) will be   calculated using the Chronic Kidney Disease Epidemiology Collaboration   (CKD-EPI) equation.       GFR Estimate If Black   Date Value Ref Range Status   07/08/2021 60 (L) >60 mL/min/[1.73_m2] Final     Comment:      GFR Calc  Starting 12/18/2018, serum creatinine based estimated GFR (eGFR) will be   calculated using the Chronic Kidney Disease Epidemiology Collaboration   (CKD-EPI) equation.       Lab Results   Component Value Date    CR 1.20 02/07/2024    CR 1.32 07/08/2021     No results found for: \"MICROALBUMIN\"    Healthy " Eating:  Healthy Eating Assessed Today: Yes  Cultural/Yarsani diet restrictions?: No  Meal planning/habits: Carb counting  How many times a week on average do you eat food made away from home (restaurant/take-out)?: 3    Being Active:  Being Active Assessed Today: Yes  Barrier to exercise: Physical limitation    Monitoring:  Monitoring Assessed Today: Yes  Blood Glucose Meter: CGM  Times checking blood sugar at home (number): 5+  Times checking blood sugar at home (per): Day  Blood glucose trend: Other    Taking Medications:  Diabetes Medication(s)       Diabetic Other       glucose (BD GLUCOSE) 4 g chewable tablet Take 1 tablet by mouth every hour as needed for low blood sugar       Insulin       Insulin Aspart FlexPen 100 UNIT/ML SOPN Inject 1-14 Units Subcutaneous 3 times daily (before meals) Inject 3 times daily with meals as instructed.  Max TDD 30.            Current Treatments: Insulin Injections  Dose schedule: Pre-breakfast, Pre-lunch, Pre-dinner  Given by: Patient    Problem Solving:  Problem Solving Assessed Today: Yes  Is the patient at risk for hypoglycemia?: Yes  Hypoglycemia Frequency: Weekly  Hypoglycemia Treatment: Juice, Candy, Other food, Glucose (tablets or gel)    Hypoglycemia Symptoms  Hypoglycemia: Dizziness/Lightheadedness, Hunger, Feeling shaky         Reducing Risks:  Reducing Risks Assessed Today: Yes  CAD Risks: Diabetes Mellitus, Family history, Hypertension, Stress  Has dilated eye exam at least once a year?: No  Sees dentist every 6 months?: No  Feet checked by healthcare provider in the last year?: No    Healthy Coping:  Healthy Coping Assessed Today: Yes  Emotional response to diabetes: Ready to learn  Informal Support system:: Family, Other, Brenda based  Stage of change: ACTION (Actively working towards change)  Support resources: Websites  Patient Activation Measure Survey Score:      5/10/2018     1:00 PM   EMY Score (Last Two)   EMY Raw Score 35   Activation Score 72.1   EMY  Level 3     Care Plan and Education Provided:  There are no care plans that you recently modified to display for this patient.    Dolly Riley RD, LD, [facility]  Diabetes Education    Time Spent: 30 minutes  Encounter Type: Individual

## 2024-03-05 NOTE — Clinical Note
3/5/2024         RE: Divina Delgadillo  [id number] [facility] [address]        Dear Colleague,    Thank you for referring your patient, Divina Delgadillo, to the [facility] [place]. Please see a copy of my visit note below.    0.9  45              Diabetes Self-Management Education & Support    Presents for: Insulin Pump and CGM Start    Type of Service: In Person Visit    [employer] 780G insulin pump training:  Last basal insulin dose: 26 hours ago (takes in am)  Last bolus insulin dose:  > 12 hours ago  Blood sugar 125 during pump start    Insulin pump training done by [employer]  - 2.5 hours.  Completed 30 mins DSME within training. Writer observed training and was there to help.     Spent time reviewing carbohydrates, food examples, bolsuing, hypoglycemia prevention, MDI back up plan and ongoing communication.     Patient was able to start insulin pump today without difficulty. Yes     Pump start per orders - Basal and ISF weakened by 10% for safety and hypoglycemia risk reduction.     PLAN:  Follow-up daily with phone calls or [employer]   Upload to [employer] 2-3 times a day (hit upload button in kwesi)   Ella to call in 24 hours   First sensor and infusion set change to be completed with Ella in person on Friday   Infusion set change with writer on the phone monday   Infusion set change & sensor change with Ella next thurday   Phi 3/17 - first infusion set change alone   3/20 - virtual sensor change with Ella   Continue with positive diet & lifestyle changes     Any diabetes medication dose changes were made via the CDE Protocol and Collaborative Practice Agreement with the patient's referring provider, primary care provider, and endocrinology provider. A copy of this encounter was shared with the provider.    SUBJECTIVE/OBJECTIVE:  Presents for: Insulin Pump and CGM Start  Accompanied by: Self, Mother, Other  Diabetes education in the past 24mo: Yes  Focus of  "Visit: Insulin Pump  Type of Pump visit: Pump Start  Diabetes type: Type 2  Disease course: Improving  How confident are you filling out medical forms by yourself:: Quite a bit  Other concerns:: None  Cultural Influences/Ethnic Background:  Not  or     Diabetes Symptoms & Complications:  Disease course: Improving       Patient Problem List and Family Medical History reviewed for relevant medical history, current medical status, and diabetes risk factors.    Vitals:  There were no vitals taken for this visit.  Estimated body mass index is 39.23 kg/m  as calculated from the following:    Height as of 3/7/24: 1.613 m (5' 3.5\").    Weight as of 3/7/24: 102.1 kg (225 lb).   Last 3 BP:   BP Readings from Last 3 Encounters:   02/15/24 120/74   02/13/24 (!) 156/67   02/05/24 129/57       History   Smoking Status     Former     Packs/day: 0.50     Types: Cigarettes     Start date: 11/27/2023     Quit date: 2/12/2024   Smokeless Tobacco     Never       Labs:  Lab Results   Component Value Date    A1C 8.3 09/13/2023    A1C 7.4 07/08/2021     Lab Results   Component Value Date     02/07/2024     09/18/2023     12/19/2022     07/08/2021     Lab Results   Component Value Date    LDL 81 12/21/2022    LDL 72 03/26/2021     HDL Cholesterol   Date Value Ref Range Status   03/26/2021 57 >49 mg/dL Final     Direct Measure HDL   Date Value Ref Range Status   12/21/2022 72 >=50 mg/dL Final   ]  GFR Estimate   Date Value Ref Range Status   02/07/2024 59 (L) >60 mL/min/1.73m2 Final   07/08/2021 52 (L) >60 mL/min/[1.73_m2] Final     Comment:     Non  GFR Calc  Starting 12/18/2018, serum creatinine based estimated GFR (eGFR) will be   calculated using the Chronic Kidney Disease Epidemiology Collaboration   (CKD-EPI) equation.       GFR Estimate If Black   Date Value Ref Range Status   07/08/2021 60 (L) >60 mL/min/[1.73_m2] Final     Comment:      GFR Calc  Starting " "12/18/2018, serum creatinine based estimated GFR (eGFR) will be   calculated using the Chronic Kidney Disease Epidemiology Collaboration   (CKD-EPI) equation.       Lab Results   Component Value Date    CR 1.20 02/07/2024    CR 1.32 07/08/2021     No results found for: \"MICROALBUMIN\"    Healthy Eating:  Healthy Eating Assessed Today: Yes  Cultural/Episcopal diet restrictions?: No  Meal planning/habits: Carb counting  How many times a week on average do you eat food made away from home (restaurant/take-out)?: 3    Being Active:  Being Active Assessed Today: Yes  Barrier to exercise: Physical limitation    Monitoring:  Monitoring Assessed Today: Yes  Blood Glucose Meter: CGM  Times checking blood sugar at home (number): 5+  Times checking blood sugar at home (per): Day  Blood glucose trend: Other    Taking Medications:  Diabetes Medication(s)       Diabetic Other       glucose (BD GLUCOSE) 4 g chewable tablet Take 1 tablet by mouth every hour as needed for low blood sugar       Insulin       Insulin Aspart FlexPen 100 UNIT/ML SOPN Inject 1-14 Units Subcutaneous 3 times daily (before meals) Inject 3 times daily with meals as instructed.  Max TDD 30.            Current Treatments: Insulin Injections  Dose schedule: Pre-breakfast, Pre-lunch, Pre-dinner  Given by: Patient    Problem Solving:  Problem Solving Assessed Today: Yes  Is the patient at risk for hypoglycemia?: Yes  Hypoglycemia Frequency: Weekly  Hypoglycemia Treatment: Juice, Candy, Other food, Glucose (tablets or gel)    Hypoglycemia Symptoms  Hypoglycemia: Dizziness/Lightheadedness, Hunger, Feeling shaky         Reducing Risks:  Reducing Risks Assessed Today: Yes  CAD Risks: Diabetes Mellitus, Family history, Hypertension, Stress  Has dilated eye exam at least once a year?: No  Sees dentist every 6 months?: No  Feet checked by healthcare provider in the last year?: No    Healthy Coping:  Healthy Coping Assessed Today: Yes  Emotional response to diabetes: " Ready to learn  Informal Support system:: Family, Other, Brenda based  Stage of change: ACTION (Actively working towards change)  Support resources: Websites  Patient Activation Measure Survey Score:      5/10/2018     1:00 PM   EMY Score (Last Two)   EMY Raw Score 35   Activation Score 72.1   EMY Level 3     Care Plan and Education Provided:  There are no care plans that you recently modified to display for this patient.    Dolly Riley RD, LD, [facility]  Diabetes Education    Time Spent: 30 minutes  Encounter Type: Individual

## 2024-03-05 NOTE — PATIENT INSTRUCTIONS
Breakfast:    Cereal - keto friendly + Fairlife milk = 15 grams  (small carbohydrate meal)   Yogurt parfait with fruit & granola = 40 grams   (medium carbohydrate meal)   Cheerios + banana + milk = 65 grams carbohydrate (larger carbohydrate meal)       Lunch:   Brownberry thin slices of bread (12 grams per slice)     [place] with thin bread = 24 grams carbohydrate (small carbohydrate meal)   Peanut butter and jelly sandwich  =  40 grams  (medium carbohydrate meal)   [place] + carbohydrate side (pretzels / chips / fruit)  =  60 grams carbohydrate (larger carbohydrate meal)     Dinner:   Salad with croutons, tomatoes carrots, dressing  = 20 grams carbohydrate (smaller carbohydrate meal)   Meat + veggie +  1 cup potatoes or small wild = 30 grams carbohydrate (medium carbohydrate meal)   1.5 cups mac/cheese = 65 grams carbohydrate (larger carbohydrate of meal)      Snack:   Pretzels  - small baggie  1/2 cup is a serving:  15 grams, bigger serving 30 grams       ______________________________________________________________________  Snacks: usually 15- 30 grams carbohydrate  (1.5 to 3 units of insulin)    Meals:   Small - usually 15-30 grams carbohydrate  (1.5 to 3 units of insulin)   Medium 30-50  grams carbohydrate  (3 to 5 units of insulin)   Larger 50-70 grams carbohydrate  (5 to 7 units of insulin

## 2024-03-07 ENCOUNTER — VIRTUAL VISIT (OUTPATIENT)
Dept: SLEEP MEDICINE | Facility: CLINIC | Age: 39
End: 2024-03-07
Attending: INTERNAL MEDICINE
Payer: COMMERCIAL

## 2024-03-07 ENCOUNTER — TELEPHONE (OUTPATIENT)
Dept: PODIATRY | Facility: CLINIC | Age: 39
End: 2024-03-07

## 2024-03-07 VITALS — HEIGHT: 64 IN | BODY MASS INDEX: 38.41 KG/M2 | WEIGHT: 225 LBS

## 2024-03-07 DIAGNOSIS — E66.9 OBESITY (BMI 30-39.9): ICD-10-CM

## 2024-03-07 DIAGNOSIS — G25.81 RESTLESS LEGS SYNDROME (RLS): ICD-10-CM

## 2024-03-07 DIAGNOSIS — R06.83 SNORING: ICD-10-CM

## 2024-03-07 DIAGNOSIS — F51.04 PSYCHOPHYSIOLOGICAL INSOMNIA: Primary | ICD-10-CM

## 2024-03-07 PROCEDURE — 99203 OFFICE O/P NEW LOW 30 MIN: CPT | Mod: 95

## 2024-03-07 ASSESSMENT — SLEEP AND FATIGUE QUESTIONNAIRES
HOW LIKELY ARE YOU TO NOD OFF OR FALL ASLEEP WHILE SITTING INACTIVE IN A PUBLIC PLACE: SLIGHT CHANCE OF DOZING
HOW LIKELY ARE YOU TO NOD OFF OR FALL ASLEEP WHILE WATCHING TV: MODERATE CHANCE OF DOZING
HOW LIKELY ARE YOU TO NOD OFF OR FALL ASLEEP WHILE SITTING AND READING: WOULD NEVER DOZE
HOW LIKELY ARE YOU TO NOD OFF OR FALL ASLEEP WHILE SITTING AND TALKING TO SOMEONE: WOULD NEVER DOZE
HOW LIKELY ARE YOU TO NOD OFF OR FALL ASLEEP WHEN YOU ARE A PASSENGER IN A CAR FOR AN HOUR WITHOUT A BREAK: SLIGHT CHANCE OF DOZING
HOW LIKELY ARE YOU TO NOD OFF OR FALL ASLEEP IN A CAR, WHILE STOPPED FOR A FEW MINUTES IN TRAFFIC: WOULD NEVER DOZE
HOW LIKELY ARE YOU TO NOD OFF OR FALL ASLEEP WHILE SITTING QUIETLY AFTER LUNCH WITHOUT ALCOHOL: HIGH CHANCE OF DOZING
HOW LIKELY ARE YOU TO NOD OFF OR FALL ASLEEP WHILE LYING DOWN TO REST IN THE AFTERNOON WHEN CIRCUMSTANCES PERMIT: HIGH CHANCE OF DOZING

## 2024-03-07 ASSESSMENT — PAIN SCALES - GENERAL: PAINLEVEL: MODERATE PAIN (5)

## 2024-03-07 NOTE — PATIENT INSTRUCTIONS
"Your ferritin was low at 35 ng/mL. Levels under 75 can contribute to restless legs symptoms. I recommend that you start taking an iron supplement (if not already taking), either ferrous sulfate, ferrous fumarate or ferrous gluconate. The dose should be 325 mg. It will be better absorbed if you take it with 500 mg vitamin C, or even a glass of orange juice. Ferrous sulfate is the most common form of iron supplement, but also the most likely to cause an upset stomach. Talk with a pharmacist to discuss options. Vitron C is a brand that has Vitamin C included. Take the iron supplement once every other day. Side effects of upset stomach, constipation and discoloration of stools can occur. If these occur, take with food.  Ensure plenty of water and fiber in diet. Consider Colace if constipation is a problem. Avoid taking iron with levothyroxine and antacids. We will recheck the ferritin level in 2-3 months.               MY TREATMENT INFORMATION FOR SLEEP APNEA-  Divina Delgadillo    DOCTOR : VERONIQUE Guadalupe CNP    Am I having a sleep study at a sleep center?  --->Due to normal delays, you will be contacted within 2-4 weeks to schedule    Am I having a home sleep study?  --->Watch the video for the device you are using:    -/drop off device- https://www.FonJax.com/watch?v=yGGFBdELGhk    Frequently asked questions:  1. What is Obstructive Sleep Apnea (ROMERO)? ROMERO is the most common type of sleep apnea. Apnea means, \"without breath.\"  Apnea is most often caused by narrowing or collapse of the upper airway as muscles relax during sleep.   Almost everyone has occasional apneas. Most people with sleep apnea have had brief interruptions at night frequently for many years.  The severity of sleep apnea is related to how frequent and severe the events are.   2. What are the consequences of ROMERO? Symptoms include: feeling sleepy during the day, snoring loudly, gasping or stopping of breathing, trouble sleeping, and " occasionally morning headaches or heartburn at night.  Sleepiness can be serious and even increase the risk of falling asleep while driving. Other health consequences may include development of high blood pressure and other cardiovascular disease in persons who are susceptible. Untreated ROMERO can contribute to heart disease, stroke and diabetes.   3. What are the treatment options? In most situations, sleep apnea is a lifelong disease that must be managed with daily therapy. Medications are not effective for sleep apnea and surgery is generally not considered until other therapies have been tried. Your treatment is your choice. Continuous Positive Airway (CPAP) works right away and is the therapy that is effective in nearly everyone. An oral device to hold your jaw forward is usually the next most reliable option. Other options include postioning devices (to keep you off your back), weight loss, and surgery including a tongue pacing device. There is more detail about some of these options below.  4. Are my sleep studies covered by insurance? Although we will request verification of coverage, we advise you also check in advance of the study to ensure there is coverage.    Important tips for those choosing CPAP and similar devices  For new devices, sign up for device KWESI to monitor your device for your followup visits  We encourage you to utilize the myAir by [employer] kwesi or website (myAir web ([facility]) to monitor your therapy progress and share the data with your healthcare team when you discuss your sleep apnea.                                                    Know your equipment:  CPAP is continuous positive airway pressure that prevents obstructive sleep apnea by keeping the throat from collapsing while you are sleeping. In most cases, the device is  smart  and can slowly self-adjusts if your throat collapses and keeps a record every day of how well you are treated-this information is available to you and your  care team.  BPAP is bilevel positive airway pressure that keeps your throat open and also assists each breath with a pressure boost to maintain adequate breathing.  Special kinds of BPAP are used in patients who have inadequate breathing from lung or heart disease. In most cases, the device is  smart  and can slowly self-adjusts to assist breathing. Like CPAP, the device keeps a record of how well you are treated.  Your mask is your connection to the device. You get to choose what feels most comfortable and the staff will help to make sure if fits. Here: are some examples of the different masks that are available: Magnetic mask aids may assist with use but there are safety issues that should be addressed when considering with magnets* (see end of discussion).       Key points to remember on your journey with sleep apnea:  Sleep study.  PAP devices often need to be adjusted during a sleep study to show that they are effective and adjusted right.  Good tips to remember: Try wearing just the mask during a quiet time during the day so your body adapts to wearing it. A humidifier is recommended for comfort in most cases to prevent drying of your nose and throat. Allergy medication from your provider may help you if you are having nasal congestion.  Getting settled-in. It takes more than one night for most of us to get used to wearing a mask. Try wearing just the mask during a quiet time during the day so your body adapts to wearing it. A humidifier is recommended for comfort in most cases. Our team will work with you carefully on the first day and will be in contact within 4 days and again at 2 and 4 weeks for advice and remote device adjustments. Your therapy is evaluated by the device each day.   Use it every night. The more you are able to sleep naturally for 7-8 hours, the more likely you will have good sleep and to prevent health risks or symptoms from sleep apnea. Even if you use it 4 hours it helps. Occasionally  all of us are unable to use a medical therapy, in sleep apnea, it is not dangerous to miss one night.   Communicate. Call our skilled team on the number provided on the first day if your visit for problems that make it difficult to wear the device. Over 2 out of 3 patients can learn to wear the device long-term with help from our team. Remember to call our team or your sleep providers if you are unable to wear the device as we may have other solutions for those who cannot adapt to mask CPAP therapy. It is recommended that you sleep your sleep provider within the first 3 months and yearly after that if you are not having problems.   Use it for your health. We encourage use of CPAP masks during daytime quiet periods to allow your face and brain to adapt to the sensation of CPAP so that it will be a more natural sensation to awaken to at night or during naps. This can be very useful during the first few weeks or months of adapting to CPAP though it does not help medically to wear CPAP during wakefulness and  should not be used as a strategy just to meet guidelines.  Take care of your equipment. Make sure you clean your mask and tubing using directions every day and that your filter and mask are replaced as recommended or if they are not working.     *Masks with magnets:  Updated Contraindications  Masks with magnetic components are contraindicated for use by patients where they, or anyone in close physical contact while using the mask, have the following:   Active medical implants that interact with magnets (i.e., pacemakers, implantable cardioverter defibrillators (ICD), neurostimulators, cerebrospinal fluid (CSF) shunts, insulin/infusion pumps)   Metallic implants/objects containing ferromagnetic material (i.e., aneurysm clips/flow disruption devices, embolic coils, stents, valves, electrodes, implants to restore hearing or balance with implanted magnets, ocular implants, metallic splinters in the eye)  Updated  Warning  Keep the mask magnets at a safe distance of at least 6 inches (150 mm) away from implants or medical devices that may be adversely affected by magnetic interference. This warning applies to you or anyone in close physical contact with your mask. The magnets are in the frame and lower headgear clips, with a magnetic field strength of up to 400mT. When worn, they connect to secure the mask but may inadvertently detach while asleep.  Implants/medical devices, including those listed within contraindications, may be adversely affected if they change function under external magnetic fields or contain ferromagnetic materials that attract/repel to magnetic fields (some metallic implants, e.g., contact lenses with metal, dental implants, metallic cranial plates, screws, salud hole covers, and bone substitute devices). Consult your physician and  of your implant / other medical device for information on the potential adverse effects of magnetic fields.    BESIDES CPAP, WHAT OTHER THERAPIES ARE THERE?    Positioning Device  Positioning devices are generally used when sleep apnea is mild and only occurs on your back.This example shows a pillow that straps around the waist. It may be appropriate for those whose sleep study shows milder sleep apnea that occurs primarily when lying flat on one's back. Preliminary studies have shown benefit but effectiveness at home may need to be verified by a home sleep test. These devices are generally not covered by medical insurance.  Examples of devices that maintain sleeping on the back to prevent snoring and mild sleep apnea.    Belt type body positioner  http://Indicative Software.Capillary [employer]/    Electronic reminder  http://nightshifttherapy.com/            Oral Appliance  What is oral appliance therapy?  An oral appliance device fits on your teeth at night like a retainer used after having braces. The device is made by a specialized dentist and requires several visits over 1-2 months  before a manufactured device is made to fit your teeth and is adjusted to prevent your sleep apnea. Once an oral device is working properly, snoring should be improved. A home sleep test may be recommended at that time if to determine whether the sleep apnea is adequately treated.       Some things to remember:  -Oral devices are often, but not always, covered by your medical insurance. Be sure to check with your insurance provider.   -If you are referred for oral therapy, you will be given a list of specialized dentists to consider or you may choose to visit the Web site of the American Academy of Dental Sleep Medicine  -Oral devices are less likely to work if you have severe sleep apnea or are extremely overweight.     More detailed information  An oral appliance is a small acrylic device that fits over the upper and lower teeth  (similar to a retainer or a mouth guard). This device slightly moves jaw forward, which moves the base of the tongue forward, opens the airway, improves breathing for effective treat snoring and obstructive sleep apnea in perhaps 7 out of 10 people .  The best working devices are custom-made by a dental device  after a mold is made of the teeth 1, 2, 3.  When is an oral appliance indicated?  Oral appliance therapy is recommended as a first-line treatment for patients with primary snoring, mild sleep apnea, and for patients with moderate sleep apnea who prefer appliance therapy to use of CPAP4, 5. Severity of sleep apnea is determined by sleep testing and is based on the number of respiratory events per hour of sleep.   How successful is oral appliance therapy?  The success rate of oral appliance therapy in patients with mild sleep apnea is 75-80% while in patients with moderate sleep apnea it is 50-70%. The chance of success in patients with severe sleep apnea is 40-50%. The research also shows that oral appliances have a beneficial effect on the cardiovascular health of ROMERO  patients at the same magnitude as CPAP therapy7.  Oral appliances should be a second-line treatment in cases of severe sleep apnea, but if not completely successful then a combination therapy utilizing CPAP plus oral appliance therapy may be effective. Oral appliances tend to be effective in a broad range of patients although studies show that the patients who have the highest success are females, younger patients, those with milder disease, and less severe obesity. 3, 6.   Finding a dentist that practices dental sleep medicine  Specific training is available through the American Academy of Dental Sleep Medicine for dentists interested in working in the field of sleep. To find a dentist who is educated in the field of sleep and the use of oral appliances, near you, visit the Web site of the American Academy of Dental Sleep Medicine.    References  1. Cam et al. Objectively measured vs self-reported compliance during oral appliance therapy for sleep-disordered breathing. Chest 2013; 144(5): 0361-8375.  2. Bette et al. Objective measurement of compliance during oral appliance therapy for sleep-disordered breathing. Thorax 2013; 68(1): 91-96.  3. Ken, et al. Mandibular advancement devices in 620 men and women with ROMERO and snoring: tolerability and predictors of treatment success. Chest 2004; 125: 6846-3533.  4. Charbel, et al. Oral appliances for snoring and ROMERO: a review. Sleep 2006; 29: 244-262.  5. Amarilis et al. Oral appliance treatment for ROMERO: an update. J Clin Sleep Med 2014; 10(2): 215-227.  6. Gauri et al. Predictors of OSAH treatment outcome. J Dent Res 2007; 86: 6597-4327.      Weight Loss: Your Body mass index is 39.23 kg/m .    Being overweight does not necessarily mean you will have health consequences.  Those who have BMI over 35 or over 27 with existing medical conditions carries greater risk.   Weight loss decreases severity of sleep apnea in most people with obesity. For  those with mild obesity who have developed snoring with weight gain, even 15-30 pound weight loss can improve and occasionally milder eliminate sleep apnea.  Structured and life-long dietary and health habits are necessary to lose weight and keep healthier weight levels.     The Comprehensive Weight loss program offers all aspects of weight loss strategies including two Non-Surgical Weight Loss Programs: Medical Weight Management and our 24 Week Healthy Lifestyle Program:    Medical Weight Management: You will meet with a Medical Weight Management Provider, as well as a Registered Dietician. The program may include medication therapy, dietary education, recommended exercise and physical therapy programs, monthly support group meetings, and possible psychological counseling. Follow up visits with the provider or dietician are scheduled based on your progress and needs.    24 Week Healthy Lifestyle Program: This unique program is designed to give you the support of weekly appointments and activities thru a 24-week period. It may include all of the components of the basic program (above), with the addition of 11 individual Health  Visits, 24-week access to the [facility] [employer] [facility] website for over 700 online classes, and monthly support group meetings. This program has an out-of-pocket expense of $499 to cover the items that can not be billed to insurance (health coaches and [facility] access), and is non-refundable/non-transferable (you may be able to use a Health Savings Account; ask your \[facility] [place] [facility]\"" provider). There may be an optional meal replacement plan prescribed as well.   Surgical management achieves meaningful long-term weight loss and improvement in health risks in most patients with more severe obesity.      Sleep Apnea Surgery:    Surgery for obstructive sleep apnea is considered generally only when other therapies fail to work. Surgery may be discussed with you if you are having a difficult time tolerating CPAP and or  when there is an abnormal structure that requires surgical correction.  Nose and throat surgeries often enlarge the airway to prevent collapse.  Most of these surgeries create pain for 1-2 weeks and up to half of the most common surgeries are not effective throughout life.  You should carefully discuss the benefits and drawbacks to surgery with your sleep provider and surgeon to determine if it is the best solution for you.   More information  Surgery for ROMERO is directed at areas that are responsible for narrowing or complete obstruction of the airway during sleep.  There are a wide range of procedures available to enlarge and/or stabilize the airway to prevent blockage of breathing in the three major areas where it can occur: the palate, tongue, and nasal regions.  Successful surgical treatment depends on the accurate identification of the factors responsible for obstructive sleep apnea in each person.  A personalized approach is required because there is no single treatment that works well for everyone.  Because of anatomic variation, consultation with an examination by a sleep surgeon is a critical first step in determining what surgical options are best for each patient.  In some cases, examination during sedation may be recommended in order to guide the selection of procedures.  Patients will be counseled about risks and benefits as well as the typical recovery course after surgery. Surgery is typically not a cure for a person s ROMERO.  However, surgery will often significantly improve one s ROMERO severity (termed  success rate ).  Even in the absence of a cure, surgery will decrease the cardiovascular risk associated with OSA7; improve overall quality of life8 (sleepiness, functionality, sleep quality, etc).    Palate Procedures:  Patients with ROMERO often have narrowing of their airway in the region of their tonsils and uvula.  The goals of palate procedures are to widen the airway in this region as well as to help  the tissues resist collapse.  Modern palate procedure techniques focus on tissue conservation and soft tissue rearrangement, rather than tissue removal.  Often the uvula is preserved in this procedure. Residual sleep apnea is common in patient after pharyngoplasty with an average reduction in sleep apnea events of 33%2.      Tongue Procedures:  While patients are awake, the muscles that surround the throat are active and keep this region open for breathing. These muscles relax during sleep, allowing the tongue and other structures to collapse and block breathing.  There are several different tongue procedures available.  Selection of a tongue base procedure depends on characteristics seen on physical exam.  Generally, procedures are aimed at removing bulky tissues in this area or preventing the back of the tongue from falling back during sleep.  Success rates for tongue surgery range from 50-62%3.    Hypoglossal Nerve Stimulation:  Hypoglossal nerve stimulation has recently received approval from the [place] Food and Drug Administration for the treatment of obstructive sleep apnea.  This is based on research showing that the system was safe and effective in treating sleep apnea6.  Results showed that the median AHI score decreased 68%, from 29.3 to 9.0. This therapy uses an implant system that senses breathing patterns and delivers mild stimulation to airway muscles, which keeps the airway open during sleep.  The system consists of three fully implanted components: a small generator (similar in size to a pacemaker), a breathing sensor, and a stimulation lead.  Using a small handheld remote, a patient turns the therapy on before bed and off upon awakening.    Candidates for this device must be greater than 18 years of age, have moderate to severe obstructive sleep apnea with less than 25% central events  (AHI between 15-65), BMI less than 35, have tried CPAP/oral appliance for at least 8 weeks without success,  and have appropriate upper airway anatomy (determined by a sleep endoscopy performed by Dr. Migue Antunez or Dr. Alfonzo Cam).    Nasal Procedures:  Nasal obstruction can interfere with nasal breathing during the day and night.  Studies have shown that relief of nasal obstruction can improve the ability of some patients to tolerate positive airway pressure therapy for obstructive sleep apnea1.  Treatment options include medications such as nasal saline, topical corticosteroid and antihistamine sprays, and oral medications such as antihistamines or decongestants. Non-surgical treatments can include external nasal dilators for selected patients. If these are not successful by themselves, surgery can improve the nasal airway either alone or in combination with these other options.        Combination Procedures:  Combination of surgical procedures and other treatments may be recommended, particularly if patients have more than one area of narrowing or persistent positional disease.  The success rate of combination surgery ranges from 66-80%2,3.    References  Humaira RUBIN. The Role of the Nose in Snoring and Obstructive Sleep Apnoea: An Update.  Eur Arch Otorhinolaryngol. 2011; 268: 1365-73.   Jasiel SM; Ramirez JA; Jani JR; Pallanch JF; Giovanni MB; Lacey SG; Katy LEE. Surgical modifications of the upper airway for obstructive sleep apnea in adults: a systematic review and meta-analysis. SLEEP 2010;33(10):2301-4782. Angie CORDOVA. Hypopharyngeal surgery in obstructive sleep apnea: an evidence-based medicine review.  Arch Otolaryngol Head Neck Surg. 2006 Feb;132(2):206-13.  Mynor YH1, Kerri Y, Ryan SAMY. The efficacy of anatomically based multilevel surgery for obstructive sleep apnea. Otolaryngol Head Neck Surg. 2003 Oct;129(4):327-35.  Angie CORDOVA, Goldberg A. Hypopharyngeal Surgery in Obstructive Sleep Apnea: An Evidence-Based Medicine Review. Arch Otolaryngol Head Neck Surg. 2006 Feb;132(2):206-13.  Irish WALKER et al.  Upper-Airway Stimulation for Obstructive Sleep Apnea.  N Engl J Med. 2014 Jan 9;370(2):139-49.  Naomi Y et al. Increased Incidence of Cardiovascular Disease in Middle-aged Men with Obstructive Sleep Apnea. Am J Respir Crit Care Med; 2002 166: 159-165  Beckie MCKINNON et al. Studying Life Effects and Effectiveness of Palatopharyngoplasty (SLEEP) study: Subjective Outcomes of Isolated Uvulopalatopharyngoplasty. Otolaryngol Head Neck Surg. 2011; 144: 623-631.  WHAT IF I ONLY HAVE SNORING?  Mandibular advancement devices, lateral sleep positioning, long-term weight loss and treatment of nasal allergies have been shown to improve snoring.  Exercising tongue muscles with a game (https://Axceler.Alektrona/esolidar/kwesi/InstraGrok-reduce-snoring/jq8087545429) or stimulating the tongue during the day with a device (https://doi.org/10.3390/tek92458953) have improved snoring in some individuals.  https://www.Fishbowl.[employer]/  https://www.sleepfoundation.org/best-anti-snoring-mouthpieces-and-mouthguards    Remember to Drive Safe... Drive Alive     Sleep health profoundly affects your health, mood, and your safety.  Thirty three percent of the population (one in three of us) is not getting enough sleep and many have a sleep disorder. Not getting enough sleep or having an untreated / undertreated sleep condition may make us sleepy without even knowing it. In fact, our driving could be dramatically impaired due to our sleep health. As your provider, here are some things I would like you to know about driving:     Here are some warning signs for impairment and dangerous drowsy driving:              -Having been awake more than 16 hours               -Looking tired               -Eyelid drooping              -Head nodding (it could be too late at this point)              -Driving for more than 30 minutes     Some things you could do to make the driving safer if you are experiencing some drowsiness:              -Stop driving and rest              -Call  for transportation              -Make sure your sleep disorder is adequately treated     Some things that have been shown NOT to work when experiencing drowsiness while driving:              -Turning on the radio              -Opening windows              -Eating any  distracting  /  entertaining  foods (e.g., sunflower seeds, candy, or any other)              -Talking on the phone      Your decision may not only impact your life, but also the life of [id number]. [email or web address], remember to [email or web address] [id number] [phone] and [id number] [email or web address]

## 2024-03-07 NOTE — PROGRESS NOTES
Virtual Visit Details  Type of service: Video Visit   Video Start Time: 10:11 AM  Video End Time: 10:40 AM  Originating Location (pt. Location): Home  Distant Location (provider location): Off-site  Platform used for Video Visit: [facility]    Outpatient Sleep Medicine Consultation:      Name: Divina Delgadillo MRN# [id number]   Age: 38 year old YOB: 1985     Date of Consultation: March 7, 2024  Consultation is requested by: Shital Rodriguez DO  [address] [place] [address] Shital Rodriguez  Primary care provider: Jaleesa Velasquez       Reason for Sleep Consult:     Divina Delgadillo is sent by Shital Rodriguez for a sleep consultation regarding insomnia.    Patient s Reason for visit  Divina Delgadillo main reason for visit:    Patient states problem(s) started:    Divina Delgadillo's goals for this visit:             Assessment and Plan:     Summary Sleep Diagnoses:  (F51.04) Psychophysiological insomnia (primary encounter diagnosis) (R06.83) Snoring (E66.9) Obesity (BMI 30-39.9)  Comment: Divina is a 38-year-old female who presents to the sleep clinic with concerns of insomnia and snoring. She had a month where she didn't feel like she slept at all. That was in November 2023. Her psychiatrist then prescribed suvorexant (Belsomra) 10 mg at bedtime which has helped a lot. Currently it is only taking her 15-20 minutes to fall asleep. She may struggle about once per week if she is anxious or has a lot on her mind. She is up two times per night to use the bathroom. She says she snores like a freight train regardless of her sleep position. She denies snort/gasp arousals and witnessed apneic spells. She takes a 15-20 minute nap 1-2 times per day, and she can unintentionally doze while watching TV. Her STOP-BANG is 3/8- snoring, HTN, and BMI >35 (39).   Plan: Comprehensive Sleep Study  Divina is at intermediate risk for obstructive sleep apnea. Recommended a repeat in lab polysomnogram.  She is up 35 lbs. since her 2021 sleep study. We reviewed treatment options for obstructive sleep apnea including PAP therapy, mandibular advancement device, positional therapy, surgery, weight management, and hypoglossal nerve stimulator. Her insomnia has improved with the suvorexant; however, we discussed sleep compression as Divina is spending up to 12 hours in bed.       (G25.81) Restless legs syndrome (RLS)   Comment: Divina says she definitely has restless legs. She also has neuropathy. She gets a creepy crawly sensation in her legs. She has been on gabapentin in the past for neuropathy but she had swelling and kidney issues. Restless legs are bothersome almost every night and can interfere with her falling asleep. Last ferritin on file from 10/19/2023 was 35 ng/mL.       Plan: Recommended every other day iron supplementation with 325 mg of either ferrous sulfate, ferrous fumarate, or ferrous gluconate. We reviewed possible side effects. We will recheck her ferritin after 2-3 months of consistent supplementation.      Comorbid Diagnoses:  Obesity, GERD, Type 2 DM, neuropathy, Chiari malformation type I, asthma, HTN, Stage 3 CKD, schizoaffective disorder bipolar type, PTSD, depression    Summary Recommendations:  Orders Placed This Encounter   Procedures    Comprehensive Sleep Study     Summary Counseling:    Sleep Testing Reviewed  Obstructive Sleep Apnea Reviewed  Complications of Untreated Sleep Apnea Reviewed    Patient will follow up 2-3 months after her sleep study.   VERONIQUE Guadalupe CNP    Total time spent reviewing medical records, history and physical examination, review of previous testing and interpretation as well as documentation on this date: 40 minutes     CC: Shital Rodriguez DO          History of Present Illness:     Divina presents to the sleep clinic with concerns of insomnia. She had a month where she didn't feel like she slept at all. That was in November 2023. Her psychiatrist  prescribed suvorexant (Belsomra) 10 mg at bedtime which has helped a lot.      She recently broke her femur.     Past Sleep Evaluations:     4/29/2021 [facility] Sleep Study (190.0 lbs) - AHI 3.9, RDI 6.7, Supine AHI 3.9, REM AHI 8.0, Low O2 84.2%, Time Spent ?88% 0.5 minutes / Time Spent ?89% 0.9 minutes.    SLEEP-WAKE SCHEDULE:     Work/School Days: Patient goes to school/work: She is currently on medical leave. She usually works as a dietary aide.   Usually gets into bed at 8-9 PM. She feels tired at this time.   Takes patient about 15-20 minutes to fall asleep  Has trouble falling asleep 1 night per week. She may struggle if she is anxious or has a lot on her mind.   Wakes up in the middle of the night 2 times.  Wakes up to use the bathroom or from a dream   She has trouble falling back asleep 0 times a week.   It usually takes <10 minutes to get back to sleep  Patient is usually up at 6:30-7 AM  Uses alarm: Yes     Weekends/Non-work Days/All Other Days:  Usually gets into bed at 10-11 PM   Takes patient about 10 minutes to fall asleep  Patient is usually up at 10:30 AM is the latest she sleeps in. She takes her pills at 7:30 AM and goes back to sleep.   Uses alarm: Yes     Sleep Need  Patient gets 5-6 hours sleep on average   Patient thinks she needs about 7-8 hours of sleep    Divina Delgadillo prefers to sleep in this position(s): side, but since recovering from surgery she has been sleeping on her back   Patient states they do the following activities in bed: None     Naps  Patient takes a purposeful nap 1-2 times a day and naps are usually 15-20 minutes in duration  She feels better after a nap: Yes  She dozes off unintentionally sometimes while watching TV.   Patient has had a driving accident or near-miss due to sleepiness/drowsiness: No, she does not drive.     SLEEP DISRUPTIONS:    Breathing/Snoring  Patient snores: Yes, she says she snores like a freight train regardless of her sleep position.  She denies snort/gasp arousals.   Other people complain about her snoring: Yes   Patient has been told she stops breathing in her sleep: No   She has issues with the following: She denies morning headaches and nocturnal heartburn/reflux.     Movement:  Patient gets pain, discomfort, with an urge to move: She says she definitely has restless legs. She also has neuropathy. She gets a creepy crawly sensation in her legs. She has been on gabapentin in the past for neuropathy but she had swelling and kidney issues. Restless legs are bothersome almost every night and can interfere with her falling asleep. Last ferritin on file from 10/19/2023 was 35 ng/mL.      It happens when she is resting: Yes  It happens more at night: Yes  Patient has been told she kicks her legs at night: No     Behaviors in Sleep:  Divina Delgadillo has experienced the following behaviors while sleeping: Bruxism- she has dentures so she takes her dentures out at night. Sleep talking.    She has experienced sudden muscle weakness during the day: No  Pt denies sleep talking, sleep walking, and dream enactment behavior. Pt denies sleep paralysis, hypnagogue and cataplexy.    Is there anything else you would like your sleep provider to know:      CAFFEINE AND OTHER SUBSTANCES:    Patient consumes caffeinated beverages per day: 0 She usually only drinks water.   Last caffeine use is usually:    List of any prescribed or over the counter stimulants that patient takes: None  List of any prescribed or over the counter sleep medication patient takes: Suvorexant 10 mg at bedtime. She hardly ever takes the trazodone 50 mg.  List of previous sleep medications that patient has tried: None  Patient drinks alcohol to help them sleep: No  Patient drinks alcohol near bedtime: No    Family History:  Patient has a family member been diagnosed with a sleep disorder: Yes, her maternal grandfather has sleep apnea.       SCALES:    EPWORTH SLEEPINESS SCALE          3/7/2024     9:43 AM    [place] Sleepiness Scale ( DANYELL Martin  [id number]<br>ESS - [place]/English - Final version - 21 Nov 07 - [facility] [place].)   Sitting and reading Would never doze   Watching TV Moderate chance of dozing   Sitting, inactive in a public place (e.g. a theatre or a meeting) Slight chance of dozing   As a passenger in a car for an hour without a break Slight chance of dozing   Lying down to rest in the afternoon when circumstances permit High chance of dozing   Sitting and talking to someone Would never doze   Sitting quietly after a lunch without alcohol High chance of dozing   In a car, while stopped for a few minutes in traffic Would never doze   [place] Score (MC) 10   [place] Score (Sleep) 10         INSOMNIA SEVERITY INDEX (MARTIN)          3/7/2024     9:45 AM   Insomnia Severity Index (MARTIN)   Difficulty falling asleep 2   Difficulty staying asleep 3   Problems waking up too early 3   How SATISFIED/DISSATISFIED are you with your CURRENT sleep pattern? 2   How NOTICEABLE to others do you think your sleep problem is in terms of impairing the quality of your life? 2   How WORRIED/DISTRESSED are you about your current sleep problem? 3   To what extent do you consider your sleep problem to INTERFERE with your daily functioning (e.g. daytime fatigue, mood, ability to function at work/daily chores, concentration, memory, mood, etc.) CURRENTLY? 3   MARTIN Total Score 18       Guidelines for Scoring/Interpretation:  Total score categories:  0-7 = No clinically significant insomnia   8-14 = Subthreshold insomnia   15-21 = Clinical insomnia (moderate severity)  22-28 = Clinical insomnia (severe)  Used via courtesy of www.[employer].va.gov with permission from Onur Ojeda PhD., [facility]      STOP BANG         3/7/2024     9:40 AM   STOP BANG Questionnaire (  2008, the American Society of Anesthesiologists, Inc. [employer]   BMI Clinic: 39.23         GAD7        1/2/2023  "    2:19 PM   NELSON-7    1. Feeling nervous, anxious, or on edge 1   2. Not being able to stop or control worrying 1   3. Worrying too much about different things 1   4. Trouble relaxing 0   5. Being so restless that it is hard to sit still 0   6. Becoming easily annoyed or irritable 0   7. Feeling afraid, as if something awful might happen 1   NELSON-7 Total Score 4   If you checked any problems, how difficult have they made it for you to do your work, take care of things at home, or get along with other people? Somewhat difficult         CAGE-AID         No data to display                CAGE-AID reprinted with permission from the [place] Medical Journal, HEVER Fitzgerald. and KERON Holloway, \"Conjoint screening questionnaires for alcohol and drug abuse\" [place] Medical Journal 94: 135-140, 1995.      PATIENT HEALTH QUESTIONNAIRE-9 (PHQ - 9)        12/13/2023     9:54 AM   PHQ-9 ([employer])   1.  Little interest or pleasure in doing things 0   2.  Feeling down, depressed, or hopeless 1   3.  Trouble falling or staying asleep, or sleeping too much 3   4.  Feeling tired or having little energy 1   5.  Poor appetite or overeating 2   6.  Feeling bad about yourself - or that you are a failure or have let yourself or your family down 2   7.  Trouble concentrating on things, such as reading the newspaper or watching television 0   8.  Moving or speaking so slowly that other people could have noticed. Or the opposite - being so fidgety or restless that you have been moving around a lot more than usual 0   9.  Thoughts that you would be better off dead, or of hurting yourself in some way 0   PHQ-9 Total Score 9   6.  Feeling bad about yourself 2   7.  Trouble concentrating 0   8.  Moving slowly or restless 0   9.  Suicidal or self-harm thoughts 0   1.  Little interest or pleasure in doing things Not at all   2.  Feeling down, depressed, or hopeless Several days   3.  Trouble falling or staying asleep, or sleeping too much Nearly every " day   4.  Feeling tired or having little energy Several days   5.  Poor appetite or overeating More than half the days   6.  Feeling bad about yourself More than half the days   7.  Trouble concentrating Not at all   8.  Moving slowly or restless Not at all   9.  Suicidal or self-harm thoughts Not at all   PHQ-9 via [facility] TOTAL SCORE-----> 9 (Mild depression)   Difficulty at work, home, or with people Extremely difficult       Developed by Luli Fuentes, Aide Mendosa, Melchor Bailey and colleagues, with an educational julia from [employer] No permission required to reproduce, translate, display or distribute.        Allergies:    Allergies   Allergen Reactions    Acetaminophen Other (See Comments)     pt previously tried to overdose on it, PMD does not want pt taking per pt.        Medications:    Current Outpatient Medications   Medication Sig Dispense Refill    acetaminophen (TYLENOL) 325 MG tablet Take 650 mg by mouth every 4 hours as needed for pain or fever 2 tab every 4-6 hours as needed, do not exceed 9 tabs in 24hours      albuterol (VENTOLIN HFA) 108 (90 Base) MCG/ACT inhaler INHALE 2 PUFFS INTO THE LUNGS EVERY SIX  HOURS AS NEEDED FOR SHORTNESS OF BREATH 18 g 10    ARIPiprazole (ABILIFY) 30 MG tablet Take 30 mg by mouth daily      atorvastatin (LIPITOR) 10 MG tablet Take 1 tablet (10 mg) by mouth daily 90 tablet 2    bumetanide (BUMEX) 0.5 MG tablet Take 1 tablet (0.5 mg) by mouth daily 30 tablet 2    carvedilol (COREG) 12.5 MG tablet Take 1 tablet (12.5 mg) by mouth 2 times daily 60 tablet 11    cloZAPine (CLOZARIL) 100 MG tablet Take 100 mg by mouth One in the morning, 2 at night      fluocinolone acetonide 0.01 % OIL Place 4 drops in ear(s) 2 times daily as needed (ear itching) 20 mL 3    FLUoxetine (PROZAC) 40 MG capsule Take 40 mg by mouth daily      glucose (BD GLUCOSE) 4 g chewable tablet Take 1 tablet by mouth every hour as needed for low blood sugar 30 tablet 4    Insulin Aspart  "FlexPen 100 UNIT/ML SOPN Inject 1-14 Units Subcutaneous 3 times daily (before meals) Inject 3 times daily with meals as instructed.  Max TDD 30. 30 mL 4    Insulin Infusion Pump (MINIMED 780G INSULIN PUMP) KIT 1 each daily SmartGuard Target: 100; Active Insulin Time: 2 hours (recommended); SmartGuardTM Warmup/Manual Mode: Suspend before low (recommended) 1 kit 0    Insulin Infusion Pump Supplies (EXTENDED INFUSION SET 23\"/6MM) MISC 1 each every 7 days Max Total Daily Dose 70 units; Change infusion set & reservoir every 7 days (recommended) 10 each 6    Insulin Infusion Pump Supplies (EXTENDED RESERVOIR 3ML) MISC 1 each every 7 days 10 each 11    lamoTRIgine (LAMICTAL) 100 MG tablet Take 100 mg by mouth At Bedtime      omeprazole (PRILOSEC) 20 MG DR capsule TAKE 1 CAPSULE BY MOUTH EVERY DAY 90 capsule 0    Suvorexant (BELSOMRA) 10 MG tablet Take 10 mg by mouth at bedtime      topiramate (TOPAMAX) 50 MG tablet Take 1 tablet (50 mg) by mouth 2 times daily 60 tablet 3    traZODone (DESYREL) 50 MG tablet 50 mg as needed      ACCU-CHEK GUIDE test strip Use to test blood sugar 3 times daily or as directed. (Patient not taking: Reported on 3/7/2024) 150 strip 1    blood glucose (ACCU-CHEK GUIDE) test strip Use to test blood sugar 3 times daily or as directed. (Patient not taking: Reported on 3/7/2024) 100 strip 11       Problem List:  Patient Active Problem List    Diagnosis Date Noted    Chiari malformation type I (H) 06/12/2023     Priority: Medium    Ulnar neuropathy of both upper extremities 09/20/2022     Priority: Medium    Insomnia, unspecified type 08/30/2021     Priority: Medium    History of DVT of arm  (deep vein thrombosis) 01/23/2021     Priority: Medium     ultrasound 1/6/2017-  venous thrombus in the antecubital fossa region and the left forearm as described      Schizoaffective disorder, depressive type (H) 07/19/2019     Priority: Medium    Acquired asplenia 03/22/2019     Priority: Medium    Chronic " "leukocytosis 11/27/2018     Priority: Medium     Due to spleen removal      Nicotine abuse 08/13/2018     Priority: Medium    Uncontrolled type 2 diabetes mellitus with diabetic polyneuropathy, with long-term current use of insulin 01/24/2018     Priority: Medium    Mild intermittent asthma with acute exacerbation 01/16/2018     Priority: Medium    Morbid obesity (H) 01/09/2018     Priority: Medium    IUD (intrauterine device) in place 07/26/2017     Priority: Medium     Mirena IUD inserted in office with premed 7/26/2017        Cervical high risk HPV (human papillomavirus) test positive 06/20/2017     Priority: Medium     6/15/2017 Pap:NIL, +HR HPV \"other\" (not 16/18). Plan to repeat Pap+HPV in 1 year  6/14/2018 Pap: NIL/neg HPV. Plan Pap+HPV in 3 years (2021)  3/26/21 NIL pap, Neg HPV. Plan cotest in 3 years.       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, splenectomy, left adrenalectomy 02/26/2015;      Type 2 diabetes mellitus with stage 3 chronic kidney disease, with long-term current use of insulin (H) 02/27/2017     Priority: Medium    Bipolar disorder (H) 02/27/2017     Priority: Medium    Orthostatic hypotension 01/10/2017     Priority: Medium    Tobacco abuse 01/10/2017     Priority: Medium    Long term (current) use of anticoagulants 01/09/2017     Priority: Medium    Stage 3 chronic kidney disease 12/03/2015     Priority: Medium     Overview:   Updated per 10/1/17 IMO import      Vitamin D insufficiency 06/01/2015     Priority: Medium    Cardiac murmur 08/20/2013     Priority: Medium    Depressive disorder 09/15/2012     Priority: Medium    Hyperlipidemia LDL goal <100 10/31/2010     Priority: Medium    Borderline personality disorder (H) 11/06/2009     Priority: Medium    Essential hypertension 06/13/2008     Priority: Medium    NAFL (nonalcoholic fatty liver) 04/11/2006     Priority: Medium     " See flowsheet for hepatic panel.   Stopped zocor, was on godon as well had right upper quandrant ultrasound and ct  ? Psych med related vs SAHNI      Esophageal reflux 04/14/2005     Priority: Medium     GERD      PTSD (post-traumatic stress disorder) 01/01/2001     Priority: Medium        Past Medical/Surgical History:  Past Medical History:   Diagnosis Date    Acquired asplenia     Acute pain of left knee 03/22/2019    Acute-on-chronic kidney injury  (H24) 03/22/2019    ARF (acute renal failure) (H24) 03/23/2019    Asthma     Bipolar disorder (H)     Cardiac murmur     Cervical high risk HPV (human papillomavirus) test positive 06/20/2017    Chiari malformation type I (H)     Chronic bilateral low back pain without sciatica     Deep venous thrombosis of arm (H) 01/06/2017    ultrasound 1/6/2017-  venous thrombus in the antecubital fossa region and the left forearm as described    Depressive disorder     DVT (deep venous thrombosis) (H)     DVT of upper extremity (deep vein thrombosis) (H) 2021    Esophageal reflux     GERD    Hypertension     Insomnia     Leukocytosis     Mild intermittent asthma     Morbid obesity (H)     Mucinous neoplasm of pancreas     NAFL (nonalcoholic fatty liver)     Neck pain     Nicotine abuse     Other specified types of schizophrenia, unspecified condition     Schizoaffective disorder, depressive type (H)     Stage 3a chronic kidney disease (CKD) (H)     Type 2 diabetes mellitus (H)     Ulnar neuropathy of both upper extremities     Vitamin D insufficiency      Past Surgical History:   Procedure Laterality Date    ADRENALECTOMY Left 2015    BIOPSY      BREAST SURGERY  2013    COLONOSCOPY      ENT SURGERY  10/01/2000    Tympanoplasty    IR LIVER BIOPSY PERCUTANEOUS  11/14/2019    PANCREATECTOMY PARTIAL  2015    PERCUTANEOUS BIOPSY LIVER Right 11/14/2019    Procedure: Percutaneous Liver Biopsy;  Surgeon: Sean Guzman PA-C;  Location: [facility] OR    SPLENECTOMY  2015     TONSILLECTOMY  10/01/2000    Tonsillectomy       Social History:  Social History     Socioeconomic History    Marital status: Single     Spouse name: Not on file    Number of children: 0    Years of education: Not on file    Highest education level: Not on file   Occupational History    Not on file   Tobacco Use    Smoking status: Former     Packs/day: .5     Types: Cigarettes     Start date: 2023     Quit date: 2024     Years since quittin.0     Passive exposure: Yes    Smokeless tobacco: Never    Tobacco comments:     A pack every 3 days   Vaping Use    Vaping Use: Every day    Substances: Nicotine, Flavoring    Devices: Disposable   Substance and Sexual Activity    Alcohol use: No     Comment: She is in AA and just got her 18 month medalion (2024)    Drug use: No    Sexual activity: Yes     Partners: Female     Birth control/protection: I.U.D.     Comment: Both male and female    Other Topics Concern    Parent/sibling w/ CABG, MI or angioplasty before 65F 55M? No   Social History Narrative    Not on file     Social Determinants of Health     Financial Resource Strain: High Risk (2023)    Financial Resource Strain     Within the past 12 months, have you or your family members you live with been unable to get utilities (heat, electricity) when it was really needed?: Yes   Food Insecurity: Low Risk  (2023)    Food Insecurity     Within the past 12 months, did you worry that your food would run out before you got money to buy more?: No     Within the past 12 months, did the food you bought just not last and you didn t have money to get more?: No   Transportation Needs: High Risk (2023)    Transportation Needs     Within the past 12 months, has lack of transportation kept you from medical appointments, getting your medicines, non-medical meetings or appointments, work, or from getting things that you need?: Yes   Physical Activity: Not on file   Stress: Not on file   Social  "Connections: Not on file   Interpersonal Safety: Low Risk  (10/19/2023)    Interpersonal Safety     Do you feel physically and emotionally safe where you currently live?: Yes     Within the past 12 months, have you been hit, slapped, kicked or otherwise physically hurt by someone?: No     Within the past 12 months, have you been humiliated or emotionally abused in other ways by your partner or ex-partner?: No   Housing Stability: Low Risk  (12/13/2023)    Housing Stability     Do you have housing? : Yes     Are you worried about losing your housing?: No       Family History:  Family History   Problem Relation Age of Onset    Respiratory Mother         ASTHMA    Allergies Mother     Depression Mother     Chronic Obstructive Pulmonary Disease Mother     Anxiety Disorder Mother     Mental Illness Mother     Asthma Mother     Heart Disease Father         HEART VALVE REPLACEMENT    Hypertension Father     Diabetes Father     Depression Father     Anxiety Disorder Father     Mental Illness Father     Obesity Father     Sleep Apnea Maternal Grandfather     Respiratory Brother     Allergies Brother     Respiratory Sister     Allergies Sister     Depression Sister     Respiratory Sister     Allergies Sister     Depression Sister     Kidney Disease No family hx of        Review of Systems:  A complete review of systems reviewed by me is negative with the exeption of what has been mentioned in the history of present illness.    Physical Examination:  Vitals: Ht 1.613 m (5' 3.5\")   Wt 102.1 kg (225 lb)   BMI 39.23 kg/m    BMI= Body mass index is 39.23 kg/m .    GENERAL APPEARANCE: healthy, alert, no distress, and cooperative  EYES: Eyes grossly normal to inspection  NECK: no asymmetry, masses, or scars  RESP: no increased work of breathing noted, no audible cough or wheeze   NEURO: mentation intact and speech normal  PSYCH: mentation appears normal and affect normal/bright  Mallampati Class: Not visualized over " "video.  Tonsillar Stage: Not visualized over video.         Data: All pertinent previous laboratory data reviewed     Recent Labs   Lab Test 02/07/24  0835 12/11/23  0943    142   POTASSIUM 4.6 4.2   CHLORIDE 108* 108*   CO2 17* 26   ANIONGAP 14 8   * 75   BUN 18.9 23.9*   CR 1.20* 1.41*   CLAY 9.0 8.9       Recent Labs   Lab Test 02/23/24  1108   WBC 14.5*   RBC 3.90   HGB 11.1*   HCT 37.6   MCV 96   MCH 28.5   MCHC 29.5*   RDW 15.9*          Recent Labs   Lab Test 12/11/23  0943 08/17/23  1231 08/02/23  1607   PROTTOTAL  --   --  7.7   ALBUMIN 3.8   < > 3.9   BILITOTAL  --   --  0.2   ALKPHOS  --   --  197*   AST  --   --  33   ALT  --   --  68*    < > = values in this interval not displayed.       TSH   Date Value   08/02/2023 0.84 uIU/mL   01/12/2022 0.72 mU/L   08/14/2020 0.61 mU/L   01/14/2020 0.58 mU/L       Amphetamine Qual Urine (no units)   Date Value   02/16/2021 Negative     Barbiturates Qual Urine (no units)   Date Value   02/16/2021 Negative     Benzodiazepine Qual Urine (no units)   Date Value   02/16/2021 Negative     Cannabinoids Qual Urine (no units)   Date Value   02/16/2021 Negative     Cocaine Qual Urine (no units)   Date Value   02/16/2021 Negative     Opiates Qualitative Urine (no units)   Date Value   02/16/2021 Negative     PCP Qual Urine (no units)   Date Value   05/25/2020 Negative       Iron Saturation Index   Date/Time Value Ref Range Status   08/15/2017 03:06 PM 5 (L) 15 - 46 % Final     Iron Sat Index   Date/Time Value Ref Range Status   10/19/2023 09:31 AM 28 15 - 46 % Final     Ferritin   Date/Time Value Ref Range Status   10/19/2023 09:31 AM 35 6 - 175 ng/mL Final   08/14/2020 03:46 PM 27 12 - 150 ng/mL Final       No results found for: \"PH\", \"PHARTERIAL\", \"PO2\", \"SA9GEDBJIBT\", \"SAT\", \"PCO2\", \"HCO3\", \"BASEEXCESS\", \"VONNIE\", \"BEB\"    @LABRCNTIPR(phv:4,pco2v:4,po2v:4,hco3v:4,castillo:4,o2per:4)@    Echocardiology: No results found for this or any previous visit (from the " past 4320 hour(s)).    Chest x-ray:   XR Ribs & Chest Rt 3vw 01/23/2024    Narrative  XR CHEST SINGLE VIEW dated 1/23/2024 12:47 PM    INDICATION: 38 years-old Female with eval for PNA.    PRIOR STUDIES: None    Impression  FINDINGS AND IMPRESSION:    Ill-defined opacity in the right lower lung, with silhouetting of the hemidiaphragm likely representing atelectasis or developing pneumonia in the right clinical setting.    No pleural effusions or visible pneumothorax.  Normal heart size. No acute bony findings.      ________________________________________________  This exam was interpreted by a [facility] [employer] radiology physician with fellowship training in cardiothoracic radiology .  If there are any questions regarding this report or other radiology questions, please feel free to contact a radiologist directly at [phone] or if in the [facility] hospital or clinics at [phone].    Final Report E-Signed By: Chichi Daigle at 1/23/2024 1:25 PM  WSN:[id number]      Chest CT:   CT Chest (PE) Abdomen Pelvis w Contrast 05/23/2023    Narrative  EXAM: CT CHEST PE ABDOMEN PELVIS W CONTRAST  LOCATION: [facility]  DATE/TIME: 5/23/2023 3:55 AM CDT    INDICATION: chest pressure.  SOB.  cough.  Lightheaded.  Elevated WBC  COMPARISON: 01/16/2023  TECHNIQUE: CT chest pulmonary angiogram and routine CT abdomen pelvis with IV contrast. Arterial phase through the chest and venous phase through the abdomen and pelvis. Multiplanar reformats and MIP reconstructions were performed. Dose reduction  techniques were used.  CONTRAST: 82 mL Isovue 370    FINDINGS:  ANGIOGRAM CHEST: Pulmonary arteries are normal caliber and negative for pulmonary emboli. Thoracic aorta is negative for dissection. No CT evidence of right heart strain.    LUNGS AND PLEURA: Normal.    MEDIASTINUM/AXILLAE: Normal.    CORONARY ARTERY CALCIFICATION: None.    HEPATOBILIARY: Normal.    PANCREAS: Distal  pancreatectomy.    SPLEEN: Splenectomy.    ADRENAL GLANDS: Normal.    KIDNEYS/BLADDER: Normal.    BOWEL: Normal.    LYMPH NODES: Normal.    VASCULATURE: Unremarkable. Retroaortic left renal vein.    PELVIC ORGANS: 3.7 cm cyst right ovary. Intrauterine device in place.    MUSCULOSKELETAL: L5 spondylolysis.    Impression  IMPRESSION:  1.  No acute process.      PFT: Most Recent [facility] Pulmonary Function Testing    VERONIQUE Guadalupe CNP 3/7/2024

## 2024-03-07 NOTE — NURSING NOTE
Is the patient currently in the state of [place]? YES    Visit mode:VIDEO    If the visit is dropped, the patient can be reconnected by: VIDEO VISIT: Text to cell phone:   Telephone Information:   Mobile [phone]       Will anyone else be joining the visit? NO  (If patient encounters technical issues they should call [phone]    How would you like to obtain your AVS? MyChart    Are changes needed to the allergy or medication list? Pt stated no changes to allergies and Pt stated no med changes    Reason for visit: Consult  Has patient had flu shot for current/most recent flu season? If so, when? No    Jessica GILL

## 2024-03-11 ENCOUNTER — NURSE TRIAGE (OUTPATIENT)
Dept: FAMILY MEDICINE | Facility: CLINIC | Age: 39
End: 2024-03-11

## 2024-03-11 ENCOUNTER — TELEPHONE (OUTPATIENT)
Dept: FAMILY MEDICINE | Facility: CLINIC | Age: 39
End: 2024-03-11

## 2024-03-11 ENCOUNTER — VIRTUAL VISIT (OUTPATIENT)
Dept: EDUCATION SERVICES | Facility: CLINIC | Age: 39
End: 2024-03-11
Payer: COMMERCIAL

## 2024-03-11 ENCOUNTER — TELEPHONE (OUTPATIENT)
Dept: SURGERY | Facility: CLINIC | Age: 39
End: 2024-03-11

## 2024-03-11 ENCOUNTER — MYC MEDICAL ADVICE (OUTPATIENT)
Dept: FAMILY MEDICINE | Facility: CLINIC | Age: 39
End: 2024-03-11

## 2024-03-11 DIAGNOSIS — L65.9 HAIR LOSS: ICD-10-CM

## 2024-03-11 DIAGNOSIS — M80.00XD OSTEOPOROSIS WITH CURRENT PATHOLOGICAL FRACTURE WITH ROUTINE HEALING, UNSPECIFIED OSTEOPOROSIS TYPE, SUBSEQUENT ENCOUNTER: Primary | ICD-10-CM

## 2024-03-11 DIAGNOSIS — E11.22 TYPE 2 DIABETES MELLITUS WITH STAGE 3A CHRONIC KIDNEY DISEASE, WITH LONG-TERM CURRENT USE OF INSULIN (H): Primary | ICD-10-CM

## 2024-03-11 DIAGNOSIS — M81.6 LOCALIZED OSTEOPOROSIS (LEQUESNE): ICD-10-CM

## 2024-03-11 DIAGNOSIS — Z79.4 TYPE 2 DIABETES MELLITUS WITH STAGE 3A CHRONIC KIDNEY DISEASE, WITH LONG-TERM CURRENT USE OF INSULIN (H): Primary | ICD-10-CM

## 2024-03-11 DIAGNOSIS — N18.31 TYPE 2 DIABETES MELLITUS WITH STAGE 3A CHRONIC KIDNEY DISEASE, WITH LONG-TERM CURRENT USE OF INSULIN (H): Primary | ICD-10-CM

## 2024-03-11 PROCEDURE — 98967 PH1 ASSMT&MGMT NQHP 11-20: CPT | Mod: 93 | Performed by: DIETITIAN, REGISTERED

## 2024-03-11 NOTE — TELEPHONE ENCOUNTER
Nurse Triage SBAR    Is this a 2nd Level Triage? YES, LICENSED PRACTITIONER REVIEW IS REQUIRED    Situation: Pt has numbness in her right thigh and aching in her right foot.        Assessment: Right thigh numbness goes from right knee to right thigh.  This started 2 weeks ago.  She can walk normally.  No pain.  Her right foot feels achy.  No discoloration in right foot.  No confusion  No trouble with speech.    Protocol Recommended Disposition:   Go To ED/UCC Now (Or To Office With PCP Approval)    Recommendation: I will do a ADS referral.         Does the patient meet one of the following criteria for ADS visit consideration? 16+ years old, with an [facility] PCP     TIP  Providers, please consider if this condition is appropriate for management at one of our Acute and Diagnostic Services sites.     If patient is a good candidate, please use dotphrase <dot>triageresponse and select Refer to ADS to document.  Reason for Disposition   Neurologic deficit that was brief (now gone), ANY of the following: * Weakness of the face, arm, or leg on one side of the body * Numbness of the face, arm, or leg on one side of the body * Loss of speech or garbled speech    Additional Information   Negative: Difficult to awaken or acting confused (e.g., disoriented, slurred speech)   Negative: New neurologic deficit that is present NOW, sudden onset of ANY of the following: * Weakness of the face, arm, or leg on one side of the body* Numbness of the face, arm, or leg on one side of the body* Loss of speech or garbled speech   Negative: Sounds like a life-threatening emergency to the triager   Negative: SEVERE weakness (i.e., unable to walk or barely able to walk, requires support) and new-onset or worsening   Negative: Confusion, disorientation, or hallucinations is main symptom   Negative: Dizziness is main symptom   Negative: Followed a head injury within last 3 days   Negative: Headache (with neurologic deficit)   Negative: Unable to  "urinate (or only a few drops) and bladder feels very full   Negative: Loss of bladder or bowel control (urine or bowel incontinence; wetting self, leaking stool) of new-onset   Negative: Back pain with numbness (loss of sensation) in groin or rectal area    Answer Assessment - Initial Assessment Questions  1. SYMPTOM: \"What is the main symptom you are concerned about?\" (e.g., weakness, numbness)      Right leg numbness  2. ONSET: \"When did this start?\" (minutes, hours, days; while sleeping)      Started 2 weeks ago  3. LAST NORMAL: \"When was the last time you (the patient) were normal (no symptoms)?\"      2 weeks ago  4. PATTERN \"Does this come and go, or has it been constant since it started?\"  \"Is it present now?\"      Constant  5. CARDIAC SYMPTOMS: \"Have you had any of the following symptoms: chest pain, difficulty breathing, palpitations?\"      NO  6. NEUROLOGIC SYMPTOMS: \"Have you had any of the following symptoms: headache, dizziness, vision loss, double vision, changes in speech, unsteady on your feet?\"      No  7. OTHER SYMPTOMS: \"Do you have any other symptoms?\"      NO  8. PREGNANCY: \"Is there any chance you are pregnant?\" \"When was your last menstrual period?\"      No    Protocols used: Neurologic Deficit-A-OH    "

## 2024-03-11 NOTE — PROGRESS NOTES
Diabetes Education Follow-up    Subjective/Objective:  Type of Service: Telephone Visit  Originating Location (Patient Location): Home  Distant Location (Provider Location): [facility] [place]  Mode of Communication:  Telephone  Telephone Visit Start Time:  1130  Telephone Visit End Time (telephone visit stop time): 1150          Assessment:  Completed an infusion set change over the phone today successfully.  Divina had the reservoir already filled.  Smart Guard started on Friday with incredible results and improvements in blood sugar control.   Will meet with [employer] rep again     Plan/Response:  Infusion & sensor change Thursday 3/14   First infusion set change on own 3/17   Follow-up on [employer] next week   Keep uploading pump twice a day to carelink ..    Dolly Riley RD, LD, [facility]  Diabetes Education  Time spent: 20 minutes    Any diabetes medication dose changes were made via the CDE Protocol and Collaborative Practice Agreement with the patient's referring provider. A copy of this encounter was shared with the provider.

## 2024-03-11 NOTE — TELEPHONE ENCOUNTER
Pls see telephone encounter below.     Chart reviewed, patient was started on alendronate 70 mg po weekly after a fall with hip fracture in [place] on 1/21/24.    # Fragility fracture    Clinical diagnosis of osteoporosis given hip fracture after low-trauma fall.* Management per [facility] Geriatric Hip Fracture Admission and Post-op Order Sets.   - Work-up for secondary causes of osteoporosis: CBC, BMP with calcium, LFT's, 25-OH vitamin D level, and TSH   - Calcium 500 mg BID   - Cholecalciferol 2000 units daily (vit D level 27)   - Alendronate 70 mg weekly   - Outpatient DXA scan through PCP   - PT/OT and nutrition consults   - Referral to [facility]    RX pended and message routed to provider for consideration.     Julie Behrendt RN

## 2024-03-11 NOTE — TELEPHONE ENCOUNTER
Referral to Acute and Diagnostic Services    [phone] ([place]) [place] [address] [place] [phone]    Transition to Acute & Diagnostic Services Clinic has been discussed with patient, and she agrees with next level of care.   Patient understands that evaluation/treatment at ADS typically takes significantly longer than in clinic/urgent care (>2 hours).  The [facility] Acute and Diagnostics Services Clinic has been contacted by provider/staff to confirm patient acceptance.     Special issues:

## 2024-03-11 NOTE — TELEPHONE ENCOUNTER
Home Health Care    Reason for call:  Homecare nurse calling about prescription for Biotin 5000 mcg, takes one a day.  Says Jaleesa has signed off on this before.  Pt needs a refill. Pt has been taking this since 2017.  I don't see this on med list.  Can Jaleesa prescribe this for patient?   Pt uses [facility]    Pt Provider: Jaleesa    Phone Number Homecare Nurse can be reached at: [phone] Yvette Cabrera on 3/11/2024 at 10:49 AM

## 2024-03-11 NOTE — TELEPHONE ENCOUNTER
I huddled with Jaycee Gardner and it is ok for patient to be seen in office with in 3 days.  Pt doesn't want to be seen today.  Appt made with Jaleesa for Wed 3/13/24.

## 2024-03-11 NOTE — LETTER
3/11/2024         RE: Divina Delgadillo  [id number] [facility] [id number]        Dear Colleague,    Thank you for referring your patient, Divina Delgadillo, to the [facility] [place]. Please see a copy of my visit note below.    Diabetes Education Follow-up    Subjective/Objective:  Type of Service: Telephone Visit  Originating Location (Patient Location): Home  Distant Location (Provider Location): [facility] [place]  Mode of Communication:  Telephone  Telephone Visit Start Time:  1130  Telephone Visit End Time (telephone visit stop time): 1150          Assessment:  Completed an infusion set change over the phone today successfully.  Divina had the reservoir already filled.  Smart Guard started on Friday with incredible results and improvements in blood sugar control.   Will meet with [employer] rep again     Plan/Response:  Infusion & sensor change Thursday 3/14   First infusion set change on own 3/17   [facility]   Keep uploading pump twice a day to carelink ..    Dolly Riley RD, LD, CDCES  Diabetes Education  Time spent: 20 minutes    Any diabetes medication dose changes were made via the CDE Protocol and Collaborative Practice Agreement with the patient's referring provider. A copy of this encounter was shared with the provider.

## 2024-03-11 NOTE — TELEPHONE ENCOUNTER
Health Call Center    Phone Message    May a detailed message be left on voicemail: yes     Reason for Call: Other: Romina calling with [facility] regarding patients alendronate 70mg once weekly prescription. Patient was prescribed this by an in patient provider while in TCU. They are wondering if Dr. Nino would like the patient to continue this prescription and if so Dr. Nino would refill it. Please call Romina back at [phone].     Action Taken: Message routed to:  Other: [phone]    Travel Screening: Not Applicable

## 2024-03-12 RX ORDER — BIOTIN 5 MG
1 TABLET ORAL DAILY
Qty: 60 TABLET | Refills: 1 | Status: SHIPPED | OUTPATIENT
Start: 2024-03-12 | End: 2024-08-01

## 2024-03-13 RX ORDER — ALENDRONATE SODIUM 70 MG/1
70 TABLET ORAL
Qty: 12 TABLET | Refills: 0 | Status: SHIPPED | OUTPATIENT
Start: 2024-03-13 | End: 2024-07-01

## 2024-03-13 NOTE — TELEPHONE ENCOUNTER
I sent Fosamax refill and recommend patient to schedule bone density screening.    VERONIQUE Spears CNP

## 2024-03-13 NOTE — TELEPHONE ENCOUNTER
Patient is notified of Rx and need for DEXA.  She voices understanding, doesn't have a pen, so requests imaging number sent via [employer].  Done.    Mer Arthur RN  [facility]

## 2024-03-21 ENCOUNTER — TELEPHONE (OUTPATIENT)
Dept: PHARMACY | Facility: OTHER | Age: 39
End: 2024-03-21
Payer: COMMERCIAL

## 2024-03-21 ENCOUNTER — MYC MEDICAL ADVICE (OUTPATIENT)
Dept: EDUCATION SERVICES | Facility: CLINIC | Age: 39
End: 2024-03-21
Payer: COMMERCIAL

## 2024-03-21 NOTE — TELEPHONE ENCOUNTER
Patient was referred by her [facility] insurance plan.  Called patient to schedule appointment. Pt scheduled with Leana Hollis on Tuesday, April 9th at 1:30 PM.    Nate Herman, ChivoD, [facility]  Medication Therapy Management Pharmacist  Pager: [phone]

## 2024-03-21 NOTE — TELEPHONE ENCOUNTER
Diabetes Education Follow-up    Subjective/Objective:  Divina Delgadillo sent in blood glucose log for review.   Diabetes is being managed with Lifestyle (diet/activity), Diabetes Medications   Diabetes Medication(s)       Diabetic Other       glucose (BD GLUCOSE) 4 g chewable tablet Take 1 tablet by mouth every hour as needed for low blood sugar       Insulin       Insulin Aspart FlexPen 100 UNIT/ML SOPN Inject 1-14 Units Subcutaneous 3 times daily (before meals) Inject 3 times daily with meals as instructed.  Max TDD 30.            Lab Results   Component Value Date    A1C 8.3 09/13/2023    A1C 8.1 08/30/2023    A1C 8.3 07/13/2023    A1C 9.2 06/01/2023    A1C 9.1 03/22/2023    A1C 7.4 07/08/2021    A1C 8.0 03/26/2021    A1C 9.0 08/14/2020    A1C 7.8 05/18/2020    A1C 7.4 02/17/2020                   Assessment:  Insulin pens (lantus / novolog) appear to have been removed from medication list as part of medication reconciliation, however these need to be re-added as part of multiple daily injections back up plan in case of pump failure.  Divina has current pens of both right now as she just recently started the pump and had filled both long & short acting insulin on purpose for this back up plan.     Divina was able to complete another infusion set change and sensor change yesterday with the [employer] rep, but virtually this time with much success.     Continue with close follow up     Plan/Response:  Pump was started 3/5.  A1c as of 6/5 or after will be reflective of current control on pump.  Anticipate any A1c done sooner to still be elevated.   Blood sugar target changed from 120 to 110 since she is not having lows and with goals of improving time in tight target,      Dolly Riley RD, LD, CDCES  Diabetes Education    A copy of this encounter was shared with the provider.

## 2024-03-23 ENCOUNTER — MYC MEDICAL ADVICE (OUTPATIENT)
Dept: ORTHOPEDICS | Facility: CLINIC | Age: 39
End: 2024-03-23
Payer: COMMERCIAL

## 2024-03-25 NOTE — PROGRESS NOTES
CHIEF COMPLAINT:   Chief Complaint   Patient presents with    Left Hip - Pain     Left femur fracture - IMN. DOS: 1/21/24. 9 weeks post-op.          SURGICAL PROCEDURE: left femur fracture IMN ([place])  DATE OF PROCEDURE: 1/22/2024      HISTORY OF PRESENT ILLNESS    Divina Delgadillo is a 38 year old female seen for postoperative follow-up of a left femur fracture intramedullary nailing that occurred 9.5 weeks ago. Returns today doing well. Weight bearing as tolerated without walker or any aid. Doing home exercise program, stopped going to therapy. No concerns today. Minimal discomfort..    Recall injury when she was in [place], on [place] sidewalk at the airport and her boot caught and went flying into the air and landed on her left side.  She was taken to a hospital in [place], noted to have a left femur fracture, and had an intramedullary nail placed.      She is diabetic, A1c=8.3 on 9/13/2023.    Present symptoms: mild pain, mild swelling.    Pain severity: 1/10  Pain quality: dull and aching  Frequency of symptoms: are occasional  Exacerbating Factors: weight bearing  Relieving Factors: rest, sitting  Night Pain: Yes  Pain while at rest: Yes     Other PMH:  has a past medical history of Acquired asplenia, Acute pain of left knee (03/22/2019), Acute-on-chronic kidney injury  (H24) (03/22/2019), ARF (acute renal failure) (H24) (03/23/2019), Asthma, Bipolar disorder (H), Cardiac murmur, Cervical high risk HPV (human papillomavirus) test positive (06/20/2017), Chiari malformation type I (H), Chronic bilateral low back pain without sciatica, Deep venous thrombosis of arm (H) (01/06/2017), Depressive disorder, DVT (deep venous thrombosis) (H), DVT of upper extremity (deep vein thrombosis) (H) (2021), Esophageal reflux, Hypertension, Insomnia, Leukocytosis, Mild intermittent asthma, Morbid obesity (H), Mucinous neoplasm of pancreas, NAFL (nonalcoholic fatty liver), Neck pain, Nicotine abuse, Other specified types  of schizophrenia, unspecified condition, Schizoaffective disorder, depressive type (H), Stage 3a chronic kidney disease (CKD) (H), Type 2 diabetes mellitus (H), Ulnar neuropathy of both upper extremities, and Vitamin D insufficiency.    She has no past medical history of Arthritis, Cerebral infarction (H), Congestive heart failure (H), COPD (chronic obstructive pulmonary disease) (H), Heart disease, History of blood transfusion, or Thyroid disease.  Patient Active Problem List   Diagnosis    Esophageal reflux    NAFL (nonalcoholic fatty liver)    Essential hypertension    Hyperlipidemia LDL goal <100    Stage 3 chronic kidney disease    Mucinous neoplasm of pancreas    Type 2 diabetes mellitus with stage 3 chronic kidney disease, with long-term current use of insulin (H)    Bipolar disorder (H)    Cervical high risk HPV (human papillomavirus) test positive    IUD (intrauterine device) in place    Morbid obesity (H)    Mild intermittent asthma with acute exacerbation    Nicotine abuse    Chronic leukocytosis    Acquired asplenia    Borderline personality disorder (H)    PTSD (post-traumatic stress disorder)    Long term (current) use of anticoagulants    Orthostatic hypotension    Tobacco abuse    Vitamin D insufficiency    Depressive disorder    Schizoaffective disorder, depressive type (H)    Uncontrolled type 2 diabetes mellitus with diabetic polyneuropathy, with long-term current use of insulin    Cardiac murmur    History of DVT of arm  (deep vein thrombosis)    Insomnia, unspecified type    Ulnar neuropathy of both upper extremities    Chiari malformation type I (H)       Surgical Hx:  has a past surgical history that includes ENT surgery (10/01/2000); tonsillectomy (10/01/2000); splenectomy (2015); Adrenalectomy (Left, 2015); Pancreatectomy partial (2015); Percutaneous biopsy liver (Right, 11/14/2019); IR Liver Biopsy Percutaneous (11/14/2019); biopsy; Breast surgery (2013); and colonoscopy.    Medications:    Current Outpatient Medications:     ACCU-CHEK GUIDE test strip, Use to test blood sugar 3 times daily or as directed. (Patient not taking: Reported on 3/7/2024), Disp: 150 strip, Rfl: 1    acetaminophen (TYLENOL) 325 MG tablet, Take 650 mg by mouth every 4 hours as needed for pain or fever 2 tab every 4-6 hours as needed, do not exceed 9 tabs in 24hours, Disp: , Rfl:     albuterol (VENTOLIN HFA) 108 (90 Base) MCG/ACT inhaler, INHALE 2 PUFFS INTO THE LUNGS EVERY SIX  HOURS AS NEEDED FOR SHORTNESS OF BREATH, Disp: 18 g, Rfl: 10    alendronate (FOSAMAX) 70 MG tablet, Take 1 tablet (70 mg) by mouth every 7 days, Disp: 12 tablet, Rfl: 0    ARIPiprazole (ABILIFY) 30 MG tablet, Take 30 mg by mouth daily, Disp: , Rfl:     atorvastatin (LIPITOR) 10 MG tablet, Take 1 tablet (10 mg) by mouth daily, Disp: 90 tablet, Rfl: 1    Biotin (BIOTIN FORTE) 5 MG TABS, Take 1 tablet by mouth daily, Disp: 60 tablet, Rfl: 1    blood glucose (ACCU-CHEK GUIDE) test strip, Use to test blood sugar 3 times daily or as directed. (Patient not taking: Reported on 3/7/2024), Disp: 100 strip, Rfl: 11    bumetanide (BUMEX) 0.5 MG tablet, Take 1 tablet (0.5 mg) by mouth daily, Disp: 30 tablet, Rfl: 2    carvedilol (COREG) 12.5 MG tablet, Take 1 tablet (12.5 mg) by mouth 2 times daily, Disp: 60 tablet, Rfl: 11    cloZAPine (CLOZARIL) 100 MG tablet, Take 100 mg by mouth One in the morning, 2 at night, Disp: , Rfl:     fluocinolone acetonide 0.01 % OIL, Place 4 drops in ear(s) 2 times daily as needed (ear itching), Disp: 20 mL, Rfl: 3    FLUoxetine (PROZAC) 40 MG capsule, Take 40 mg by mouth daily, Disp: , Rfl:     glucose (BD GLUCOSE) 4 g chewable tablet, Take 1 tablet by mouth every hour as needed for low blood sugar, Disp: 30 tablet, Rfl: 4    Insulin Aspart FlexPen 100 UNIT/ML SOPN, Inject 1-14 Units Subcutaneous 3 times daily (before meals) Inject 3 times daily with meals as instructed.  Max TDD 30., Disp: 30 mL, Rfl: 4    Insulin Infusion Pump  "(MINIMED 780G INSULIN PUMP) KIT, 1 each daily SmartGuard Target: 100; Active Insulin Time: 2 hours (recommended); SmartGuardTM Warmup/Manual Mode: Suspend before low (recommended), Disp: 1 kit, Rfl: 0    Insulin Infusion Pump Supplies (EXTENDED INFUSION SET 23\"/6MM) MISC, 1 each every 7 days Max Total Daily Dose 70 units; Change infusion set & reservoir every 7 days (recommended), Disp: 10 each, Rfl: 6    Insulin Infusion Pump Supplies (EXTENDED RESERVOIR 3ML) MISC, 1 each every 7 days, Disp: 10 each, Rfl: 11    lamoTRIgine (LAMICTAL) 100 MG tablet, Take 100 mg by mouth At Bedtime, Disp: , Rfl:     loratadine (CLARITIN) 10 MG tablet, Take 1 tablet (10 mg) by mouth every morning, Disp: 30 tablet, Rfl: 9    omeprazole (PRILOSEC) 20 MG DR capsule, TAKE 1 CAPSULE BY MOUTH EVERY DAY, Disp: 30 capsule, Rfl: 2    Suvorexant (BELSOMRA) 10 MG tablet, Take 10 mg by mouth at bedtime, Disp: , Rfl:     topiramate (TOPAMAX) 50 MG tablet, Take 1 tablet (50 mg) by mouth 2 times daily, Disp: 60 tablet, Rfl: 3    traZODone (DESYREL) 50 MG tablet, 50 mg as needed, Disp: , Rfl:     Allergies:   Allergies   Allergen Reactions    Acetaminophen Other (See Comments)     pt previously tried to overdose on it, PMD does not want pt taking per pt.        Social Hx:  reports that she quit smoking about 6 weeks ago. Her smoking use included cigarettes. She started smoking about 3 months ago. She smoked an average of 0.5 packs per day. She has been exposed to tobacco smoke. She has never used smokeless tobacco. She reports that she does not drink alcohol and does not use drugs.    Family Hx: family history includes Allergies in her brother, mother, sister, and sister; Anxiety Disorder in her father and mother; Asthma in her mother; Chronic Obstructive Pulmonary Disease in her mother; Depression in her father, mother, sister, and sister; Diabetes in her father; Heart Disease in her father; Hypertension in her father; Mental Illness in her father " "and mother; Obesity in her father; Respiratory in her brother, mother, sister, and sister; Sleep Apnea in her maternal grandfather.    REVIEW OF SYSTEMS:  CONSTITUTIONAL:NEGATIVE for fever, chills, change in weight  INTEGUMENTARY/SKIN: NEGATIVE for worrisome rashes, moles or lesions  MUSCULOSKELETAL:See HPI above  NEURO: NEGATIVE for weakness, dizziness or paresthesias      PHYSICAL EXAM:  Ht 1.575 m (5' 2\")   Wt 99.8 kg (220 lb)   BMI 40.24 kg/m     GENERAL APPEARANCE: healthy, alert, no distress    SKIN: no suspicious lesions or rashes  NEURO: Normal strength and tone, mentation intact and speech normal  PSYCH:  mentation appears normal and affect normal  RESPIRATORY: No increased work of breathing.          left LOWER EXTREMITY EXAM:  Gait: tberg, favors the left.  Intact sensation deep peroneal nerve, superficial peroneal nerve, med/lat tibial nerve, sural nerve, saphenous nerve  Intact EHL, EDL, TA, FHL, GS, quadriceps hamstrings and hip flexors  Notable quadriceps weakness.  calf soft and nttp or squeeze.  Edema: 1+    Wound / Incision: multiple surgical wounds healed well. Dry. No drainage. no erythema.  Inspection; slight swelling of the entire left lower extremity compared to the right.  Palpation: minimal over the quads, iliotibial band.  Non-tender: knee  Strength: grossly intact, weak.   No discomfort with hip range of motion.      X-RAY:  2 views left femur from 3/28/2024 were reviewed in clinic today. Stable alignment of left proximal femoral shaft fracture with increased periosteal callus formation and decreased fracture lucency about the fracture site. There is soft tissue swelling. Hardware appears in place. Intrapelvic IUD..          Impression: 38 year old female 9.5 weeks  postoperative intramedullary nailing of  left femoral shaft fracture , doing well.    Plan:   Images reviewed. Fracture healing nicely.  Weight bearing status: weight bearing as tolerated ; work on normal gait technique. Will " take time.  Pain control: over the counter as needed.  Immobilzation: none.  Physical Therapy and home exercise program.  Elevation of affected extremity  Return to clinic in 4 weeks, or sooner as needed.      Jimmie Nino M.D., M.S.  Dept. of Orthopaedic Surgery  [facility]

## 2024-03-28 ENCOUNTER — OFFICE VISIT (OUTPATIENT)
Dept: ORTHOPEDICS | Facility: CLINIC | Age: 39
End: 2024-03-28
Payer: COMMERCIAL

## 2024-03-28 ENCOUNTER — ANCILLARY PROCEDURE (OUTPATIENT)
Dept: GENERAL RADIOLOGY | Facility: CLINIC | Age: 39
End: 2024-03-28
Attending: PHYSICIAN ASSISTANT
Payer: COMMERCIAL

## 2024-03-28 VITALS — WEIGHT: 220 LBS | HEIGHT: 62 IN | BODY MASS INDEX: 40.48 KG/M2

## 2024-03-28 DIAGNOSIS — J30.2 CHRONIC SEASONAL ALLERGIC RHINITIS: ICD-10-CM

## 2024-03-28 DIAGNOSIS — S72.302D CLOSED FRACTURE OF SHAFT OF LEFT FEMUR WITH ROUTINE HEALING, SUBSEQUENT ENCOUNTER: ICD-10-CM

## 2024-03-28 DIAGNOSIS — K21.9 GASTROESOPHAGEAL REFLUX DISEASE WITHOUT ESOPHAGITIS: ICD-10-CM

## 2024-03-28 DIAGNOSIS — S72.302D CLOSED FRACTURE OF SHAFT OF LEFT FEMUR WITH ROUTINE HEALING, SUBSEQUENT ENCOUNTER: Primary | ICD-10-CM

## 2024-03-28 DIAGNOSIS — E78.5 HYPERLIPIDEMIA LDL GOAL <100: ICD-10-CM

## 2024-03-28 PROCEDURE — 99213 OFFICE O/P EST LOW 20 MIN: CPT | Performed by: ORTHOPAEDIC SURGERY

## 2024-03-28 PROCEDURE — 73552 X-RAY EXAM OF FEMUR 2/>: CPT | Mod: TC | Performed by: RADIOLOGY

## 2024-03-28 RX ORDER — LORATADINE 10 MG/1
1 TABLET ORAL EVERY MORNING
Qty: 30 TABLET | Refills: 9 | Status: SHIPPED | OUTPATIENT
Start: 2024-03-28

## 2024-03-28 RX ORDER — ATORVASTATIN CALCIUM 10 MG/1
10 TABLET, FILM COATED ORAL DAILY
Qty: 90 TABLET | Refills: 1 | Status: SHIPPED | OUTPATIENT
Start: 2024-03-28 | End: 2024-04-24

## 2024-03-28 ASSESSMENT — PAIN SCALES - GENERAL: PAINLEVEL: NO PAIN (1)

## 2024-03-28 NOTE — LETTER
3/28/2024         RE: Divina Delgadillo  [address] [facility] [address]        Dear Colleague,    Thank you for referring your patient, Divina Delgadillo, to the [facility]. Please see a copy of my visit note below.    CHIEF COMPLAINT:   Chief Complaint   Patient presents with     Left Hip - Pain     Left femur fracture - IMN. DOS: 1/21/24. 9 weeks post-op.          SURGICAL PROCEDURE: left femur fracture IMN ([place])  DATE OF PROCEDURE: 1/22/2024      HISTORY OF PRESENT ILLNESS    Divina Delgadillo is a 38 year old female seen for postoperative follow-up of a left femur fracture intramedullary nailing that occurred 9.5 weeks ago. Returns today doing well. Weight bearing as tolerated without walker or any aid. Doing home exercise program, stopped going to therapy. No concerns today. Minimal discomfort..    Recall injury when she was in [place], on [place] sidewalk at the airport and her boot caught and went flying into the air and landed on her left side.  She was taken to a hospital in [place], noted to have a left femur fracture, and had an intramedullary nail placed.      She is diabetic, A1c=8.3 on 9/13/2023.    Present symptoms: mild pain, mild swelling.    Pain severity: 1/10  Pain quality: dull and aching  Frequency of symptoms: are occasional  Exacerbating Factors: weight bearing  Relieving Factors: rest, sitting  Night Pain: Yes  Pain while at rest: Yes     Other PMH:  has a past medical history of Acquired asplenia, Acute pain of left knee (03/22/2019), Acute-on-chronic kidney injury  (H24) (03/22/2019), ARF (acute renal failure) (H24) (03/23/2019), Asthma, Bipolar disorder (H), Cardiac murmur, Cervical high risk HPV (human papillomavirus) test positive (06/20/2017), Chiari malformation type I (H), Chronic bilateral low back pain without sciatica, Deep venous thrombosis of arm (H) (01/06/2017), Depressive disorder, DVT (deep venous thrombosis) (H), DVT of  upper extremity (deep vein thrombosis) (H) (2021), Esophageal reflux, Hypertension, Insomnia, Leukocytosis, Mild intermittent asthma, Morbid obesity (H), Mucinous neoplasm of pancreas, NAFL (nonalcoholic fatty liver), Neck pain, Nicotine abuse, Other specified types of schizophrenia, unspecified condition, Schizoaffective disorder, depressive type (H), Stage 3a chronic kidney disease (CKD) (H), Type 2 diabetes mellitus (H), Ulnar neuropathy of both upper extremities, and Vitamin D insufficiency.    She has no past medical history of Arthritis, Cerebral infarction (H), Congestive heart failure (H), COPD (chronic obstructive pulmonary disease) (H), Heart disease, History of blood transfusion, or Thyroid disease.  Patient Active Problem List   Diagnosis     Esophageal reflux     NAFL (nonalcoholic fatty liver)     Essential hypertension     Hyperlipidemia LDL goal <100     Stage 3 chronic kidney disease     Mucinous neoplasm of pancreas     Type 2 diabetes mellitus with stage 3 chronic kidney disease, with long-term current use of insulin (H)     Bipolar disorder (H)     Cervical high risk HPV (human papillomavirus) test positive     IUD (intrauterine device) in place     Morbid obesity (H)     Mild intermittent asthma with acute exacerbation     Nicotine abuse     Chronic leukocytosis     Acquired asplenia     Borderline personality disorder (H)     PTSD (post-traumatic stress disorder)     Long term (current) use of anticoagulants     Orthostatic hypotension     Tobacco abuse     Vitamin D insufficiency     Depressive disorder     Schizoaffective disorder, depressive type (H)     Uncontrolled type 2 diabetes mellitus with diabetic polyneuropathy, with long-term current use of insulin     Cardiac murmur     History of DVT of arm  (deep vein thrombosis)     Insomnia, unspecified type     Ulnar neuropathy of both upper extremities     Chiari malformation type I (H)       Surgical Hx:  has a past surgical history that  includes ENT surgery (10/01/2000); tonsillectomy (10/01/2000); splenectomy (2015); Adrenalectomy (Left, 2015); Pancreatectomy partial (2015); Percutaneous biopsy liver (Right, 11/14/2019); IR Liver Biopsy Percutaneous (11/14/2019); biopsy; Breast surgery (2013); and colonoscopy.    Medications:   Current Outpatient Medications:      ACCU-CHEK GUIDE test strip, Use to test blood sugar 3 times daily or as directed. (Patient not taking: Reported on 3/7/2024), Disp: 150 strip, Rfl: 1     acetaminophen (TYLENOL) 325 MG tablet, Take 650 mg by mouth every 4 hours as needed for pain or fever 2 tab every 4-6 hours as needed, do not exceed 9 tabs in 24hours, Disp: , Rfl:      albuterol (VENTOLIN HFA) 108 (90 Base) MCG/ACT inhaler, INHALE 2 PUFFS INTO THE LUNGS EVERY SIX  HOURS AS NEEDED FOR SHORTNESS OF BREATH, Disp: 18 g, Rfl: 10     alendronate (FOSAMAX) 70 MG tablet, Take 1 tablet (70 mg) by mouth every 7 days, Disp: 12 tablet, Rfl: 0     ARIPiprazole (ABILIFY) 30 MG tablet, Take 30 mg by mouth daily, Disp: , Rfl:      atorvastatin (LIPITOR) 10 MG tablet, Take 1 tablet (10 mg) by mouth daily, Disp: 90 tablet, Rfl: 1     Biotin (BIOTIN FORTE) 5 MG TABS, Take 1 tablet by mouth daily, Disp: 60 tablet, Rfl: 1     blood glucose (ACCU-CHEK GUIDE) test strip, Use to test blood sugar 3 times daily or as directed. (Patient not taking: Reported on 3/7/2024), Disp: 100 strip, Rfl: 11     bumetanide (BUMEX) 0.5 MG tablet, Take 1 tablet (0.5 mg) by mouth daily, Disp: 30 tablet, Rfl: 2     carvedilol (COREG) 12.5 MG tablet, Take 1 tablet (12.5 mg) by mouth 2 times daily, Disp: 60 tablet, Rfl: 11     cloZAPine (CLOZARIL) 100 MG tablet, Take 100 mg by mouth One in the morning, 2 at night, Disp: , Rfl:      fluocinolone acetonide 0.01 % OIL, Place 4 drops in ear(s) 2 times daily as needed (ear itching), Disp: 20 mL, Rfl: 3     FLUoxetine (PROZAC) 40 MG capsule, Take 40 mg by mouth daily, Disp: , Rfl:      glucose (BD GLUCOSE) 4 g chewable  "tablet, Take 1 tablet by mouth every hour as needed for low blood sugar, Disp: 30 tablet, Rfl: 4     Insulin Aspart FlexPen 100 UNIT/ML SOPN, Inject 1-14 Units Subcutaneous 3 times daily (before meals) Inject 3 times daily with meals as instructed.  Max TDD 30., Disp: 30 mL, Rfl: 4     Insulin Infusion Pump (MINIMED 780G INSULIN PUMP) KIT, 1 each daily SmartGuard Target: 100; Active Insulin Time: 2 hours (recommended); SmartGuardTM Warmup/Manual Mode: Suspend before low (recommended), Disp: 1 kit, Rfl: 0     Insulin Infusion Pump Supplies (EXTENDED INFUSION SET 23\"/6MM) MISC, 1 each every 7 days Max Total Daily Dose 70 units; Change infusion set & reservoir every 7 days (recommended), Disp: 10 each, Rfl: 6     Insulin Infusion Pump Supplies (EXTENDED RESERVOIR 3ML) MISC, 1 each every 7 days, Disp: 10 each, Rfl: 11     lamoTRIgine (LAMICTAL) 100 MG tablet, Take 100 mg by mouth At Bedtime, Disp: , Rfl:      loratadine (CLARITIN) 10 MG tablet, Take 1 tablet (10 mg) by mouth every morning, Disp: 30 tablet, Rfl: 9     omeprazole (PRILOSEC) 20 MG DR capsule, TAKE 1 CAPSULE BY MOUTH EVERY DAY, Disp: 30 capsule, Rfl: 2     Suvorexant (BELSOMRA) 10 MG tablet, Take 10 mg by mouth at bedtime, Disp: , Rfl:      topiramate (TOPAMAX) 50 MG tablet, Take 1 tablet (50 mg) by mouth 2 times daily, Disp: 60 tablet, Rfl: 3     traZODone (DESYREL) 50 MG tablet, 50 mg as needed, Disp: , Rfl:     Allergies:   Allergies   Allergen Reactions     Acetaminophen Other (See Comments)     pt previously tried to overdose on it, PMD does not want pt taking per pt.        Social Hx:  reports that she quit smoking about 6 weeks ago. Her smoking use included cigarettes. She started smoking about 3 months ago. She smoked an average of 0.5 packs per day. She has been exposed to tobacco smoke. She has never used smokeless tobacco. She reports that she does not drink alcohol and does not use drugs.    Family Hx: family history includes Allergies in her " "brother, mother, sister, and sister; Anxiety Disorder in her father and mother; Asthma in her mother; Chronic Obstructive Pulmonary Disease in her mother; Depression in her father, mother, sister, and sister; Diabetes in her father; Heart Disease in her father; Hypertension in her father; Mental Illness in her father and mother; Obesity in her father; Respiratory in her brother, mother, sister, and sister; Sleep Apnea in her maternal grandfather.    REVIEW OF SYSTEMS:  CONSTITUTIONAL:NEGATIVE for fever, chills, change in weight  INTEGUMENTARY/SKIN: NEGATIVE for worrisome rashes, moles or lesions  MUSCULOSKELETAL:See HPI above  NEURO: NEGATIVE for weakness, dizziness or paresthesias      PHYSICAL EXAM:  Ht 1.575 m (5' 2\")   Wt 99.8 kg (220 lb)   BMI 40.24 kg/m     GENERAL APPEARANCE: healthy, alert, no distress    SKIN: no suspicious lesions or rashes  NEURO: Normal strength and tone, mentation intact and speech normal  PSYCH:  mentation appears normal and affect normal  RESPIRATORY: No increased work of breathing.          left LOWER EXTREMITY EXAM:  Gait: tberg, favors the left.  Intact sensation deep peroneal nerve, superficial peroneal nerve, med/lat tibial nerve, sural nerve, saphenous nerve  Intact EHL, EDL, TA, FHL, GS, quadriceps hamstrings and hip flexors  Notable quadriceps weakness.  calf soft and nttp or squeeze.  Edema: 1+    Wound / Incision: multiple surgical wounds healed well. Dry. No drainage. no erythema.  Inspection; slight swelling of the entire left lower extremity compared to the right.  Palpation: minimal over the quads, iliotibial band.  Non-tender: knee  Strength: grossly intact, weak.   No discomfort with hip range of motion.      X-RAY:  2 views left femur from 3/28/2024 were reviewed in clinic today. Stable alignment of left proximal femoral shaft fracture with increased periosteal callus formation and decreased fracture lucency about the fracture site. There is soft tissue swelling. " Hardware appears in place. Intrapelvic IUD..          Impression: 38 year old female 9.5 weeks  postoperative intramedullary nailing of  left femoral shaft fracture , doing well.    Plan:   Images reviewed. Fracture healing nicely.  Weight bearing status: weight bearing as tolerated ; work on normal gait technique. Will take time.  Pain control: over the counter as needed.  Immobilzation: none.  Physical Therapy and home exercise program.  Elevation of affected extremity  Return to clinic in 4 weeks, or sooner as needed.      Jimmie Nino M.D., M.S.  Dept. of Orthopaedic Surgery  [facility]             Again, thank you for allowing me to participate in the care of your patient.        Sincerely,        Jimmie Nino MD

## 2024-03-28 NOTE — LETTER
March 28, 2024      Divina Delgadillo  [address] [facility] [address]        To whom it may concern:     Divina Delgadillo is under my care for   1. Closed fracture of shaft of left femur with routine healing, subsequent encounter        Date of surgery: 1/22/24    Return to work date: 4/8/24     ** WITH RESTRICTIONS? Yes, with work restrictions: * Other: Lifting restriction of 10 pounds. No pushing carts. Ok to pass trays, meal prep, etc. She will return to clinic in 4 weeks for reassessment.          Next appointment: 4 weeks        Electronically Signed (as below)  Dr. Jimmie Nino M.D.

## 2024-03-29 ENCOUNTER — OFFICE VISIT (OUTPATIENT)
Dept: FAMILY MEDICINE | Facility: CLINIC | Age: 39
End: 2024-03-29
Payer: COMMERCIAL

## 2024-03-29 VITALS
TEMPERATURE: 99 F | SYSTOLIC BLOOD PRESSURE: 110 MMHG | WEIGHT: 218.5 LBS | BODY MASS INDEX: 39.96 KG/M2 | RESPIRATION RATE: 16 BRPM | OXYGEN SATURATION: 98 % | HEART RATE: 71 BPM | DIASTOLIC BLOOD PRESSURE: 62 MMHG

## 2024-03-29 DIAGNOSIS — Z23 NEED FOR VACCINATION: ICD-10-CM

## 2024-03-29 DIAGNOSIS — Z79.4 TYPE 2 DIABETES MELLITUS WITH DIABETIC POLYNEUROPATHY, WITH LONG-TERM CURRENT USE OF INSULIN (H): Primary | ICD-10-CM

## 2024-03-29 DIAGNOSIS — G57.11 MERALGIA PARESTHETICA OF RIGHT SIDE: ICD-10-CM

## 2024-03-29 DIAGNOSIS — E11.42 TYPE 2 DIABETES MELLITUS WITH DIABETIC POLYNEUROPATHY, WITH LONG-TERM CURRENT USE OF INSULIN (H): Primary | ICD-10-CM

## 2024-03-29 DIAGNOSIS — F25.0 SCHIZOAFFECTIVE DISORDER, BIPOLAR TYPE (H): ICD-10-CM

## 2024-03-29 DIAGNOSIS — Z12.4 CERVICAL CANCER SCREENING: ICD-10-CM

## 2024-03-29 LAB
BASOPHILS # BLD AUTO: 0.1 10E3/UL (ref 0–0.2)
BASOPHILS NFR BLD AUTO: 1 %
CREAT UR-MCNC: 115.7 MG/DL
EOSINOPHIL # BLD AUTO: 0.2 10E3/UL (ref 0–0.7)
EOSINOPHIL NFR BLD AUTO: 2 %
ERYTHROCYTE [DISTWIDTH] IN BLOOD BY AUTOMATED COUNT: 15.7 % (ref 10–15)
HBA1C MFR BLD: 9.8 % (ref 0–5.6)
HCT VFR BLD AUTO: 38.4 % (ref 35–47)
HGB BLD-MCNC: 11.3 G/DL (ref 11.7–15.7)
IMM GRANULOCYTES # BLD: 0 10E3/UL
IMM GRANULOCYTES NFR BLD: 0 %
LYMPHOCYTES # BLD AUTO: 3.9 10E3/UL (ref 0.8–5.3)
LYMPHOCYTES NFR BLD AUTO: 28 %
MCH RBC QN AUTO: 27.6 PG (ref 26.5–33)
MCHC RBC AUTO-ENTMCNC: 29.4 G/DL (ref 31.5–36.5)
MCV RBC AUTO: 94 FL (ref 78–100)
MICROALBUMIN UR-MCNC: 30.2 MG/L
MICROALBUMIN/CREAT UR: 26.1 MG/G CR (ref 0–25)
MONOCYTES # BLD AUTO: 0.8 10E3/UL (ref 0–1.3)
MONOCYTES NFR BLD AUTO: 6 %
NEUTROPHILS # BLD AUTO: 8.6 10E3/UL (ref 1.6–8.3)
NEUTROPHILS NFR BLD AUTO: 63 %
PLATELET # BLD AUTO: 480 10E3/UL (ref 150–450)
RBC # BLD AUTO: 4.1 10E6/UL (ref 3.8–5.2)
WBC # BLD AUTO: 13.7 10E3/UL (ref 4–11)

## 2024-03-29 PROCEDURE — 90746 HEPB VACCINE 3 DOSE ADULT IM: CPT | Performed by: NURSE PRACTITIONER

## 2024-03-29 PROCEDURE — 99214 OFFICE O/P EST MOD 30 MIN: CPT | Mod: 25 | Performed by: NURSE PRACTITIONER

## 2024-03-29 PROCEDURE — 82570 ASSAY OF URINE CREATININE: CPT | Performed by: NURSE PRACTITIONER

## 2024-03-29 PROCEDURE — 83036 HEMOGLOBIN GLYCOSYLATED A1C: CPT | Performed by: NURSE PRACTITIONER

## 2024-03-29 PROCEDURE — 36415 COLL VENOUS BLD VENIPUNCTURE: CPT | Performed by: NURSE PRACTITIONER

## 2024-03-29 PROCEDURE — G0010 ADMIN HEPATITIS B VACCINE: HCPCS | Performed by: NURSE PRACTITIONER

## 2024-03-29 PROCEDURE — 82043 UR ALBUMIN QUANTITATIVE: CPT | Performed by: NURSE PRACTITIONER

## 2024-03-29 PROCEDURE — 85025 COMPLETE CBC W/AUTO DIFF WBC: CPT | Performed by: NURSE PRACTITIONER

## 2024-03-29 ASSESSMENT — ASTHMA QUESTIONNAIRES
ACT_TOTALSCORE: 25
QUESTION_1 LAST FOUR WEEKS HOW MUCH OF THE TIME DID YOUR ASTHMA KEEP YOU FROM GETTING AS MUCH DONE AT WORK, SCHOOL OR AT HOME: NONE OF THE TIME
QUESTION_2 LAST FOUR WEEKS HOW OFTEN HAVE YOU HAD SHORTNESS OF BREATH: NOT AT ALL
ACT_TOTALSCORE: 25
QUESTION_3 LAST FOUR WEEKS HOW OFTEN DID YOUR ASTHMA SYMPTOMS (WHEEZING, COUGHING, SHORTNESS OF BREATH, CHEST TIGHTNESS OR PAIN) WAKE YOU UP AT NIGHT OR EARLIER THAN USUAL IN THE MORNING: NOT AT ALL
QUESTION_5 LAST FOUR WEEKS HOW WOULD YOU RATE YOUR ASTHMA CONTROL: COMPLETELY CONTROLLED
QUESTION_4 LAST FOUR WEEKS HOW OFTEN HAVE YOU USED YOUR RESCUE INHALER OR NEBULIZER MEDICATION (SUCH AS ALBUTEROL): NOT AT ALL

## 2024-03-29 ASSESSMENT — ENCOUNTER SYMPTOMS: NUMBNESS: 1

## 2024-03-29 ASSESSMENT — PAIN SCALES - GENERAL: PAINLEVEL: NO PAIN (0)

## 2024-03-29 NOTE — PROGRESS NOTES
Assessment & Plan     Meralgia paresthetica of right side  -discussed possible etiology, advised patient that condition is typically self limited and can resolve spontaneously, which patient states happened before, she states she had numbness in the same tight in the past   -recommended to start working on weight loss  -advised patient to wear more looser garments, avoid tight belts and tight jeans, or pants  -recommended monitoring  -if no improvement, will plan EMG study to rule out possible L3-L4 radiculopathy-which is unlikely since patient does not have history of lower back pain      Type 2 diabetes mellitus with diabetic polyneuropathy, with long-term current use of insulin (H)  -improved per patient report since patient started using insulin pump, she states her blood sugars never over 180. But unfortunately her A1C does not reflect that and her number went up to 9.8%. I am wondering if this can be due to inactivity since patient stopped exercising after she broke her left femur about 2 months ago. I will recommended Divina to follow up with Katiana again.    Lab Results   Component Value Date    A1C 9.8 03/29/2024    A1C 8.3 09/13/2023    A1C 8.1 08/30/2023    A1C 8.3 07/13/2023    A1C 9.2 06/01/2023    A1C 7.4 07/08/2021    A1C 8.0 03/26/2021    A1C 9.0 08/14/2020    A1C 7.8 05/18/2020    A1C 7.4 02/17/2020      - Hemoglobin A1c; Future  - Albumin Random Urine Quantitative with Creat Ratio; Future  - Hemoglobin A1c  - Albumin Random Urine Quantitative with Creat Ratio    Cervical cancer screening  -declined pap today, stated she will schedule separate appointment     Schizoaffective disorder, bipolar type (H)  -following with psychiatrist   - CBC with Platelets & Differential      Subjective   Divina is a 39 year old, presenting for the following health issues:  Numbness        3/29/2024    12:21 PM   Additional Questions   Roomed by og   Accompanied by self         3/29/2024    12:21 PM   Patient  Reported Additional Medications   Patient reports taking the following new medications none     History of Present Illness       Reason for visit:  Numbness on my upper thy  Symptom onset:  3-4 weeks ago  Symptom intensity:  Moderate  Symptom progression:  Staying the same  Had these symptoms before:  Yes  Has tried/received treatment for these symptoms:  No  What makes it worse:  No  What makes it better:  No    She eats 2-3 servings of fruits and vegetables daily.She consumes 0 sweetened beverage(s) daily.She exercises with enough effort to increase her heart rate 10 to 19 minutes per day.  She exercises with enough effort to increase her heart rate 4 days per week.   She is taking medications regularly.           Review of Systems  Constitutional, HEENT, cardiovascular, pulmonary, gi and gu systems are negative, except as otherwise noted.      Objective    /62   Pulse 71   Temp 99  F (37.2  C) (Tympanic)   Resp 16   Wt 99.1 kg (218 lb 8 oz)   SpO2 98%   BMI 39.96 kg/m    Body mass index is 39.96 kg/m .  Physical Exam   GENERAL: alert and no distress  EYES: Eyes grossly normal to inspection, PERRL and conjunctivae and sclerae normal  ABDOMEN: soft, nontender, no hepatosplenomegaly, no masses and bowel sounds normal  MS: no gross musculoskeletal defects noted, no edema  SKIN: no suspicious lesions or rashes  NEURO: Normal strength and tone, mentation intact and speech normal  PSYCH: mentation appears normal, affect normal/bright            Signed Electronically by: VERONIQUE Spears CNP

## 2024-03-29 NOTE — PATIENT INSTRUCTIONS
Wt Readings from Last 2 Encounters:   03/29/24 99.1 kg (218 lb 8 oz)   03/28/24 99.8 kg (220 lb)

## 2024-04-10 ENCOUNTER — VIRTUAL VISIT (OUTPATIENT)
Dept: ENDOCRINOLOGY | Facility: CLINIC | Age: 39
End: 2024-04-10
Payer: COMMERCIAL

## 2024-04-10 DIAGNOSIS — E11.22 TYPE 2 DIABETES MELLITUS WITH STAGE 3A CHRONIC KIDNEY DISEASE, WITH LONG-TERM CURRENT USE OF INSULIN (H): Primary | ICD-10-CM

## 2024-04-10 DIAGNOSIS — N18.31 TYPE 2 DIABETES MELLITUS WITH STAGE 3A CHRONIC KIDNEY DISEASE, WITH LONG-TERM CURRENT USE OF INSULIN (H): Primary | ICD-10-CM

## 2024-04-10 DIAGNOSIS — Z79.4 TYPE 2 DIABETES MELLITUS WITH STAGE 3A CHRONIC KIDNEY DISEASE, WITH LONG-TERM CURRENT USE OF INSULIN (H): Primary | ICD-10-CM

## 2024-04-10 PROBLEM — E11.40 TYPE 2 DIABETES MELLITUS WITH DIABETIC NEUROPATHY, WITH LONG-TERM CURRENT USE OF INSULIN (H): Status: RESOLVED | Noted: 2018-01-24 | Resolved: 2024-04-10

## 2024-04-10 PROBLEM — I82.629: Status: ACTIVE | Noted: 2017-01-09

## 2024-04-10 PROBLEM — S72.8X2A OTHER FRACTURE OF LEFT FEMUR, INITIAL ENCOUNTER FOR CLOSED FRACTURE (H): Status: ACTIVE | Noted: 2024-01-21

## 2024-04-10 PROBLEM — E11.40 TYPE 2 DIABETES MELLITUS WITH DIABETIC NEUROPATHY, WITH LONG-TERM CURRENT USE OF INSULIN (H): Status: ACTIVE | Noted: 2018-01-24

## 2024-04-10 PROCEDURE — 99214 OFFICE O/P EST MOD 30 MIN: CPT | Mod: 95 | Performed by: NURSE PRACTITIONER

## 2024-04-10 RX ORDER — INSULIN LISPRO 100 [IU]/ML
0-10 INJECTION, SOLUTION INTRAVENOUS; SUBCUTANEOUS
COMMUNITY
Start: 2024-01-31 | End: 2024-05-07

## 2024-04-10 RX ORDER — FLURBIPROFEN SODIUM 0.3 MG/ML
SOLUTION/ DROPS OPHTHALMIC
COMMUNITY
Start: 2024-03-07 | End: 2024-05-07

## 2024-04-10 RX ORDER — LISINOPRIL 5 MG/1
5 TABLET ORAL DAILY
COMMUNITY
Start: 2024-04-05 | End: 2024-04-26

## 2024-04-10 RX ORDER — HYDROCODONE BITARTRATE AND ACETAMINOPHEN 5; 325 MG/1; MG/1
TABLET ORAL
COMMUNITY
Start: 2023-10-25 | End: 2024-04-15

## 2024-04-10 NOTE — PROGRESS NOTES
[facility] ENDOCRINOLOGY    Diabetes Note 4/10/2024    Divina Delgadillo, 1985, [id number]          Reason for visit      1. Type 2 diabetes mellitus with stage 3a chronic kidney disease, with long-term current use of insulin (H)        MECHE     Divina Delgadillo is a very pleasant 39 year old old female who presents for follow up.  SUMMARY:    Divina is seen via video visit as a GENIE from Dr Beckman for DM 2. Her most recent A1c was 9.8 and certainly not reflective of what is happening at the present time. She is using the [employer] 780 G system for management, which was downloaded and data was reviewed.     Her A1c incorporated the time period including a fall and fracture to her left leg, as well as the recovery and surgery involved    TIR was 80%, above 20% and no hypoglycemia was recorded. GMI of 6.9. She is getting about 60 units of insulin a day.     Most recent Renal function confirmed Stage 3a CKD. She does have minimal Microalbuminuria.       Insulin Pump Settings     Basal Rate  TIME Units/HR   0000 - 0000 1.0                         Bolus: Insulin : CHO ratio  Time Units Grams CHO   0000 - 0000 1 9                          Correction  #Units Blood glucose >Target BG   1 45 140           Past Medical History     Patient Active Problem List   Diagnosis    Esophageal reflux    NAFL (nonalcoholic fatty liver)    Essential hypertension    Hyperlipidemia LDL goal <100    Stage 3 chronic kidney disease    Mucinous neoplasm of pancreas    Type 2 diabetes mellitus with stage 3 chronic kidney disease, with long-term current use of insulin (H)    Bipolar disorder (H)    Cervical high risk HPV (human papillomavirus) test positive    IUD (intrauterine device) in place    Morbid obesity (H)    Mild intermittent asthma with acute exacerbation    Nicotine abuse    Chronic leukocytosis    Acquired asplenia    Borderline personality disorder (H)    PTSD (post-traumatic stress disorder)    Long term (current) use  of anticoagulants    Orthostatic hypotension    Tobacco abuse    Vitamin D insufficiency    Depressive disorder    Schizoaffective disorder, depressive type (H)    Cardiac murmur    History of DVT of arm  (deep vein thrombosis)    Insomnia, unspecified type    Ulnar neuropathy of both upper extremities    Chiari malformation type I (H)    Deep venous thrombosis of arm (H)    Neutrophilic leukocytosis    Other fracture of left femur, initial encounter for closed fracture (H)        Family History       family history includes Allergies in her brother, mother, sister, and sister; Anxiety Disorder in her father and mother; Asthma in her mother; Chronic Obstructive Pulmonary Disease in her mother; Depression in her father, mother, sister, and sister; Diabetes in her father; Heart Disease in her father; Hypertension in her father; Mental Illness in her father and mother; Obesity in her father; Respiratory in her brother, mother, sister, and sister; Sleep Apnea in her maternal grandfather.    Social History      reports that she quit smoking about 8 weeks ago. Her smoking use included cigarettes. She started smoking about 4 months ago. She has a 0.1 pack-year smoking history. She has been exposed to tobacco smoke. She has never used smokeless tobacco. She reports that she does not drink alcohol and does not use drugs.      Review of Systems     Patient has no polyuria or polydipsia, no chest pain, dyspnea or TIA's, no numbness, tingling or pain in extremities  Remainder negative except as noted in HPI.    Vital Signs     There were no vitals taken for this visit.  Wt Readings from Last 3 Encounters:   03/29/24 99.1 kg (218 lb 8 oz)   03/28/24 99.8 kg (220 lb)   03/07/24 102.1 kg (225 lb)       Physical Exam     Constitutional:  Well developed, Well nourished  HENT:  Normocephalic,   Neck: normal in appearance  Eyes:  PERRL, Conjunctiva pink  Respiratory:  No respiratory distress  Skin: No acanthosis nigricans, lipoatrophy  "or lipodystrophy  Neurologic:  Alert & oriented x 3, nonfocal  Psychiatric:  Affect, Mood, Insight appropriate        Assessment     1. Type 2 diabetes mellitus with stage 3a chronic kidney disease, with long-term current use of insulin (H)        Bijan Murcia's control has improved significantly. Looking forward to her next A1c.  No changes to her pump settings warranted.         Shital Roberts NP  [facility]  4/10/2024  1:01 PM          Lab Results     No results found for: \"HGBA1C\", \"CREATININE\", \"MICROALBUR\"    Cholesterol   Date Value Ref Range Status   12/21/2022 175 <200 mg/dL Final   03/26/2021 147 <200 mg/dL Final     HDL Cholesterol   Date Value Ref Range Status   03/26/2021 57 >49 mg/dL Final     Direct Measure HDL   Date Value Ref Range Status   12/21/2022 72 >=50 mg/dL Final     Triglycerides   Date Value Ref Range Status   12/21/2022 108 <150 mg/dL Final   03/26/2021 90 <150 mg/dL Final     Comment:     Fasting specimen       [unfilled]      Current Medications     Outpatient Medications Prior to Visit   Medication Sig Dispense Refill    ACCU-CHEK GUIDE test strip Use to test blood sugar 3 times daily or as directed. 150 strip 1    acetaminophen (TYLENOL) 325 MG tablet Take 650 mg by mouth every 4 hours as needed for pain or fever 2 tab every 4-6 hours as needed, do not exceed 9 tabs in 24hours      albuterol (VENTOLIN HFA) 108 (90 Base) MCG/ACT inhaler INHALE 2 PUFFS INTO THE LUNGS EVERY SIX  HOURS AS NEEDED FOR SHORTNESS OF BREATH 18 g 10    alendronate (FOSAMAX) 70 MG tablet Take 1 tablet (70 mg) by mouth every 7 days 12 tablet 0    ARIPiprazole (ABILIFY) 30 MG tablet Take 30 mg by mouth daily      atorvastatin (LIPITOR) 10 MG tablet Take 1 tablet (10 mg) by mouth daily 90 tablet 1    Biotin (BIOTIN FORTE) 5 MG TABS Take 1 tablet by mouth daily 60 tablet 1    blood glucose (ACCU-CHEK GUIDE) test strip Use to test blood sugar 3 times daily or as directed. 100 strip 11    bumetanide (BUMEX) " "0.5 MG tablet Take 1 tablet (0.5 mg) by mouth daily 30 tablet 2    carvedilol (COREG) 12.5 MG tablet Take 1 tablet (12.5 mg) by mouth 2 times daily 60 tablet 11    cholecalciferol 50 MCG (2000 UT) CAPS Take 2,000 Units by mouth      cloZAPine (CLOZARIL) 100 MG tablet Take 100 mg by mouth One in the morning, 2 at night      fluocinolone acetonide 0.01 % OIL Place 4 drops in ear(s) 2 times daily as needed (ear itching) 20 mL 3    FLUoxetine (PROZAC) 40 MG capsule Take 40 mg by mouth daily      glucose (BD GLUCOSE) 4 g chewable tablet Take 1 tablet by mouth every hour as needed for low blood sugar 30 tablet 4    HYDROcodone-acetaminophen (NORCO) 5-325 MG tablet take 1 tablet by mouth every 4 to 6 hours as needed      Insulin Aspart FlexPen 100 UNIT/ML SOPN Inject 1-14 Units Subcutaneous 3 times daily (before meals) Inject 3 times daily with meals as instructed.  Max TDD 30. 30 mL 4    Insulin Infusion Pump (MINIMED 780G INSULIN PUMP) KIT 1 each daily SmartGuard Target: 100; Active Insulin Time: 2 hours (recommended); SmartGuardTM Warmup/Manual Mode: Suspend before low (recommended) 1 kit 0    Insulin Infusion Pump Supplies (EXTENDED INFUSION SET 23\"/6MM) MISC 1 each every 7 days Max Total Daily Dose 70 units; Change infusion set & reservoir every 7 days (recommended) 10 each 6    Insulin Infusion Pump Supplies (EXTENDED RESERVOIR 3ML) MISC 1 each every 7 days 10 each 11    insulin lispro (HUMALOG VIAL) 100 UNIT/ML vial Inject 0-10 Units Subcutaneous      lamoTRIgine (LAMICTAL) 100 MG tablet Take 100 mg by mouth At Bedtime      lisinopril (ZESTRIL) 5 MG tablet Take 5 mg by mouth daily      loratadine (CLARITIN) 10 MG tablet Take 1 tablet (10 mg) by mouth every morning 30 tablet 9    omeprazole (PRILOSEC) 20 MG DR capsule TAKE 1 CAPSULE BY MOUTH EVERY DAY 30 capsule 2    Suvorexant (BELSOMRA) 10 MG tablet Take 10 mg by mouth at bedtime      topiramate (TOPAMAX) 50 MG tablet Take 1 tablet (50 mg) by mouth 2 times daily 60 " tablet 3    traZODone (DESYREL) 50 MG tablet 50 mg as needed      ULTICARE MINI 31G X 6 MM insulin pen needle USE 1 PEN NEEDLE 4 times daily       No facility-administered medications prior to visit.       Virtual Visit Details    Type of service:  Video Visit   Video Start Time:  1300  Video End Time: 1320    Originating Location (pt. Location): Home    Distant Location (provider location):  On-site  Platform used for Video Visit: Lula

## 2024-04-10 NOTE — LETTER
4/10/2024         RE: Divina Delgadillo  [id number] [address] [facility] [address]        Dear Colleague,    Thank you for referring your patient, Divina Delgadillo, to the [facility]. Please see a copy of my visit note below.    [facility] ENDOCRINOLOGY    Diabetes Note 4/10/2024    Divina Delgadillo, 1985, [id number]          Reason for visit      1. Type 2 diabetes mellitus with stage 3a chronic kidney disease, with long-term current use of insulin (H)        HPI     Divina Delgadillo is a very pleasant 39 year old old female who presents for follow up.  SUMMARY:    Divina is seen via video visit as a GENIE from Dr Beckman for DM 2. Her most recent A1c was 9.8 and certainly not reflective of what is happening at the present time. She is using the [employer] 780 G system for management, which was downloaded and data was reviewed.     Her A1c incorporated the time period including a fall and fracture to her left leg, as well as the recovery and surgery involved    TIR was 80%, above 20% and no hypoglycemia was recorded. GMI of 6.9. She is getting about 60 units of insulin a day.     Most recent Renal function confirmed Stage 3a CKD. She does have minimal Microalbuminuria.       Insulin Pump Settings     Basal Rate  TIME Units/HR   0000 - 0000 1.0                         Bolus: Insulin : CHO ratio  Time Units Grams CHO   0000 - 0000 1 9                          Correction  #Units Blood glucose >Target BG   1 45 140           Past Medical History     Patient Active Problem List   Diagnosis     Esophageal reflux     NAFL (nonalcoholic fatty liver)     Essential hypertension     Hyperlipidemia LDL goal <100     Stage 3 chronic kidney disease     Mucinous neoplasm of pancreas     Type 2 diabetes mellitus with stage 3 chronic kidney disease, with long-term current use of insulin (H)     Bipolar disorder (H)     Cervical high risk HPV (human papillomavirus) test positive     IUD  (intrauterine device) in place     Morbid obesity (H)     Mild intermittent asthma with acute exacerbation     Nicotine abuse     Chronic leukocytosis     Acquired asplenia     Borderline personality disorder (H)     PTSD (post-traumatic stress disorder)     Long term (current) use of anticoagulants     Orthostatic hypotension     Tobacco abuse     Vitamin D insufficiency     Depressive disorder     Schizoaffective disorder, depressive type (H)     Cardiac murmur     History of DVT of arm  (deep vein thrombosis)     Insomnia, unspecified type     Ulnar neuropathy of both upper extremities     Chiari malformation type I (H)     Deep venous thrombosis of arm (H)     Neutrophilic leukocytosis     Other fracture of left femur, initial encounter for closed fracture (H)        Family History       family history includes Allergies in her brother, mother, sister, and sister; Anxiety Disorder in her father and mother; Asthma in her mother; Chronic Obstructive Pulmonary Disease in her mother; Depression in her father, mother, sister, and sister; Diabetes in her father; Heart Disease in her father; Hypertension in her father; Mental Illness in her father and mother; Obesity in her father; Respiratory in her brother, mother, sister, and sister; Sleep Apnea in her maternal grandfather.    Social History      reports that she quit smoking about 8 weeks ago. Her smoking use included cigarettes. She started smoking about 4 months ago. She has a 0.1 pack-year smoking history. She has been exposed to tobacco smoke. She has never used smokeless tobacco. She reports that she does not drink alcohol and does not use drugs.      Review of Systems     Patient has no polyuria or polydipsia, no chest pain, dyspnea or TIA's, no numbness, tingling or pain in extremities  Remainder negative except as noted in HPI.    Vital Signs     There were no vitals taken for this visit.  Wt Readings from Last 3 Encounters:   03/29/24 99.1 kg (218 lb 8 oz)  "  03/28/24 99.8 kg (220 lb)   03/07/24 102.1 kg (225 lb)       Physical Exam     Constitutional:  Well developed, Well nourished  HENT:  Normocephalic,   Neck: normal in appearance  Eyes:  PERRL, Conjunctiva pink  Respiratory:  No respiratory distress  Skin: No acanthosis nigricans, lipoatrophy or lipodystrophy  Neurologic:  Alert & oriented x 3, nonfocal  Psychiatric:  Affect, Mood, Insight appropriate        Assessment     1. Type 2 diabetes mellitus with stage 3a chronic kidney disease, with long-term current use of insulin (H)        Bijan Murcia's control has improved significantly. Looking forward to her next A1c.  No changes to her pump settings warranted.         Shital Roberts NP  [facility]  4/10/2024  1:01 PM          Lab Results     No results found for: \"HGBA1C\", \"CREATININE\", \"MICROALBUR\"    Cholesterol   Date Value Ref Range Status   12/21/2022 175 <200 mg/dL Final   03/26/2021 147 <200 mg/dL Final     HDL Cholesterol   Date Value Ref Range Status   03/26/2021 57 >49 mg/dL Final     Direct Measure HDL   Date Value Ref Range Status   12/21/2022 72 >=50 mg/dL Final     Triglycerides   Date Value Ref Range Status   12/21/2022 108 <150 mg/dL Final   03/26/2021 90 <150 mg/dL Final     Comment:     Fasting specimen       [unfilled]      Current Medications     Outpatient Medications Prior to Visit   Medication Sig Dispense Refill     ACCU-CHEK GUIDE test strip Use to test blood sugar 3 times daily or as directed. 150 strip 1     acetaminophen (TYLENOL) 325 MG tablet Take 650 mg by mouth every 4 hours as needed for pain or fever 2 tab every 4-6 hours as needed, do not exceed 9 tabs in 24hours       albuterol (VENTOLIN HFA) 108 (90 Base) MCG/ACT inhaler INHALE 2 PUFFS INTO THE LUNGS EVERY SIX  HOURS AS NEEDED FOR SHORTNESS OF BREATH 18 g 10     alendronate (FOSAMAX) 70 MG tablet Take 1 tablet (70 mg) by mouth every 7 days 12 tablet 0     ARIPiprazole (ABILIFY) 30 MG tablet Take 30 mg by mouth daily   " "    atorvastatin (LIPITOR) 10 MG tablet Take 1 tablet (10 mg) by mouth daily 90 tablet 1     Biotin (BIOTIN FORTE) 5 MG TABS Take 1 tablet by mouth daily 60 tablet 1     blood glucose (ACCU-CHEK GUIDE) test strip Use to test blood sugar 3 times daily or as directed. 100 strip 11     bumetanide (BUMEX) 0.5 MG tablet Take 1 tablet (0.5 mg) by mouth daily 30 tablet 2     carvedilol (COREG) 12.5 MG tablet Take 1 tablet (12.5 mg) by mouth 2 times daily 60 tablet 11     cholecalciferol 50 MCG (2000 UT) CAPS Take 2,000 Units by mouth       cloZAPine (CLOZARIL) 100 MG tablet Take 100 mg by mouth One in the morning, 2 at night       fluocinolone acetonide 0.01 % OIL Place 4 drops in ear(s) 2 times daily as needed (ear itching) 20 mL 3     FLUoxetine (PROZAC) 40 MG capsule Take 40 mg by mouth daily       glucose (BD GLUCOSE) 4 g chewable tablet Take 1 tablet by mouth every hour as needed for low blood sugar 30 tablet 4     HYDROcodone-acetaminophen (NORCO) 5-325 MG tablet take 1 tablet by mouth every 4 to 6 hours as needed       Insulin Aspart FlexPen 100 UNIT/ML SOPN Inject 1-14 Units Subcutaneous 3 times daily (before meals) Inject 3 times daily with meals as instructed.  Max TDD 30. 30 mL 4     Insulin Infusion Pump (MINIMED 780G INSULIN PUMP) KIT 1 each daily SmartGuard Target: 100; Active Insulin Time: 2 hours (recommended); SmartGuardTM Warmup/Manual Mode: Suspend before low (recommended) 1 kit 0     Insulin Infusion Pump Supplies (EXTENDED INFUSION SET 23\"/6MM) MISC 1 each every 7 days Max Total Daily Dose 70 units; Change infusion set & reservoir every 7 days (recommended) 10 each 6     Insulin Infusion Pump Supplies (EXTENDED RESERVOIR 3ML) MISC 1 each every 7 days 10 each 11     insulin lispro (HUMALOG VIAL) 100 UNIT/ML vial Inject 0-10 Units Subcutaneous       lamoTRIgine (LAMICTAL) 100 MG tablet Take 100 mg by mouth At Bedtime       lisinopril (ZESTRIL) 5 MG tablet Take 5 mg by mouth daily       loratadine " (CLARITIN) 10 MG tablet Take 1 tablet (10 mg) by mouth every morning 30 tablet 9     omeprazole (PRILOSEC) 20 MG DR capsule TAKE 1 CAPSULE BY MOUTH EVERY DAY 30 capsule 2     Suvorexant (BELSOMRA) 10 MG tablet Take 10 mg by mouth at bedtime       topiramate (TOPAMAX) 50 MG tablet Take 1 tablet (50 mg) by mouth 2 times daily 60 tablet 3     traZODone (DESYREL) 50 MG tablet 50 mg as needed       ULTIC[employer] MINI 31G X 6 MM insulin pen needle USE 1 PEN NEEDLE 4 times daily       No facility-administered medications prior to visit.                             Again, thank you for allowing me to participate in the care of your patient.        Sincerely,        Shital Roberts NP

## 2024-04-15 ENCOUNTER — OFFICE VISIT (OUTPATIENT)
Dept: FAMILY MEDICINE | Facility: CLINIC | Age: 39
End: 2024-04-15
Payer: COMMERCIAL

## 2024-04-15 VITALS
HEIGHT: 65 IN | DIASTOLIC BLOOD PRESSURE: 68 MMHG | OXYGEN SATURATION: 98 % | WEIGHT: 221.6 LBS | TEMPERATURE: 99.5 F | SYSTOLIC BLOOD PRESSURE: 136 MMHG | RESPIRATION RATE: 16 BRPM | BODY MASS INDEX: 36.92 KG/M2 | HEART RATE: 83 BPM

## 2024-04-15 DIAGNOSIS — G43.009 MIGRAINE WITHOUT AURA AND WITHOUT STATUS MIGRAINOSUS, NOT INTRACTABLE: ICD-10-CM

## 2024-04-15 DIAGNOSIS — Z79.4 TYPE 2 DIABETES MELLITUS WITH STAGE 3A CHRONIC KIDNEY DISEASE, WITH LONG-TERM CURRENT USE OF INSULIN (H): Primary | ICD-10-CM

## 2024-04-15 DIAGNOSIS — E11.22 TYPE 2 DIABETES MELLITUS WITH STAGE 3A CHRONIC KIDNEY DISEASE, WITH LONG-TERM CURRENT USE OF INSULIN (H): Primary | ICD-10-CM

## 2024-04-15 DIAGNOSIS — N18.31 TYPE 2 DIABETES MELLITUS WITH STAGE 3A CHRONIC KIDNEY DISEASE, WITH LONG-TERM CURRENT USE OF INSULIN (H): Primary | ICD-10-CM

## 2024-04-15 DIAGNOSIS — R42 DIZZINESS: ICD-10-CM

## 2024-04-15 LAB
ANION GAP SERPL CALCULATED.3IONS-SCNC: 12 MMOL/L (ref 7–15)
BASOPHILS # BLD AUTO: 0.1 10E3/UL (ref 0–0.2)
BASOPHILS NFR BLD AUTO: 1 %
BUN SERPL-MCNC: 23.9 MG/DL (ref 6–20)
CALCIUM SERPL-MCNC: 8.7 MG/DL (ref 8.6–10)
CHLORIDE SERPL-SCNC: 106 MMOL/L (ref 98–107)
CREAT SERPL-MCNC: 1.57 MG/DL (ref 0.51–0.95)
DEPRECATED HCO3 PLAS-SCNC: 24 MMOL/L (ref 22–29)
EGFRCR SERPLBLD CKD-EPI 2021: 43 ML/MIN/1.73M2
EOSINOPHIL # BLD AUTO: 0.2 10E3/UL (ref 0–0.7)
EOSINOPHIL NFR BLD AUTO: 2 %
ERYTHROCYTE [DISTWIDTH] IN BLOOD BY AUTOMATED COUNT: 15.8 % (ref 10–15)
GLUCOSE SERPL-MCNC: 164 MG/DL (ref 70–99)
HCT VFR BLD AUTO: 37 % (ref 35–47)
HGB BLD-MCNC: 11 G/DL (ref 11.7–15.7)
IMM GRANULOCYTES # BLD: 0.1 10E3/UL
IMM GRANULOCYTES NFR BLD: 1 %
LYMPHOCYTES # BLD AUTO: 4.1 10E3/UL (ref 0.8–5.3)
LYMPHOCYTES NFR BLD AUTO: 30 %
MCH RBC QN AUTO: 27.2 PG (ref 26.5–33)
MCHC RBC AUTO-ENTMCNC: 29.7 G/DL (ref 31.5–36.5)
MCV RBC AUTO: 91 FL (ref 78–100)
MONOCYTES # BLD AUTO: 0.7 10E3/UL (ref 0–1.3)
MONOCYTES NFR BLD AUTO: 5 %
NEUTROPHILS # BLD AUTO: 8.6 10E3/UL (ref 1.6–8.3)
NEUTROPHILS NFR BLD AUTO: 62 %
PLATELET # BLD AUTO: 482 10E3/UL (ref 150–450)
POTASSIUM SERPL-SCNC: 4.7 MMOL/L (ref 3.4–5.3)
RBC # BLD AUTO: 4.05 10E6/UL (ref 3.8–5.2)
SODIUM SERPL-SCNC: 142 MMOL/L (ref 135–145)
WBC # BLD AUTO: 13.9 10E3/UL (ref 4–11)

## 2024-04-15 PROCEDURE — 36415 COLL VENOUS BLD VENIPUNCTURE: CPT | Performed by: NURSE PRACTITIONER

## 2024-04-15 PROCEDURE — 80048 BASIC METABOLIC PNL TOTAL CA: CPT | Performed by: NURSE PRACTITIONER

## 2024-04-15 PROCEDURE — 85025 COMPLETE CBC W/AUTO DIFF WBC: CPT | Performed by: NURSE PRACTITIONER

## 2024-04-15 PROCEDURE — 99214 OFFICE O/P EST MOD 30 MIN: CPT | Performed by: NURSE PRACTITIONER

## 2024-04-15 RX ORDER — MECLIZINE HYDROCHLORIDE 25 MG/1
25 TABLET ORAL 3 TIMES DAILY PRN
Qty: 30 TABLET | Refills: 0 | Status: SHIPPED | OUTPATIENT
Start: 2024-04-15

## 2024-04-15 RX ORDER — PANTOPRAZOLE SODIUM 40 MG/1
40 TABLET, DELAYED RELEASE ORAL
COMMUNITY
Start: 2024-02-01 | End: 2024-05-07

## 2024-04-15 ASSESSMENT — PAIN SCALES - GENERAL: PAINLEVEL: NO PAIN (0)

## 2024-04-15 ASSESSMENT — ENCOUNTER SYMPTOMS: DIZZINESS: 1

## 2024-04-15 NOTE — PATIENT INSTRUCTIONS
Recommend to try Meclizine 25 mg 3 times daily as needed    Will check labs today to make sure kidney function stable, hemoglobin level.

## 2024-04-15 NOTE — PROGRESS NOTES
Assessment & Plan     Type 2 diabetes mellitus with stage 3a chronic kidney disease, with long-term current use of insulin (H)  -recheck A1C in 2-3 months   - Basic metabolic panel  (Ca, Cl, CO2, Creat, Gluc, K, Na, BUN); Future    Dizziness  -due to vertigo vs orthostatic hypotension   -BP's readings:  Sitting 136/68  Standing 122/62  -recommended to hold Bumex for 3 days due to worsened renal function, recheck BMP in 5-7 days   - Basic metabolic panel  (Ca, Cl, CO2, Creat, Gluc, K, Na, BUN); Future  - CBC with platelets and differential; Future  - will try meclizine (ANTIVERT) 25 MG tablet; Take 1 tablet (25 mg) by mouth 3 times daily as needed for dizziness  - Basic metabolic panel  (Ca, Cl, CO2, Creat, Gluc, K, Na, BUN)  - CBC with platelets and differential  - Basic metabolic panel  (Ca, Cl, CO2, Creat, Gluc, K, Na, BUN); Future  -follow up if no improvement in 5-7 days     Migraine without aura and without status migrainosus, not intractable  -adjusted Topamax due to worsened renal function  - topiramate (TOPAMAX) 25 MG tablet; Take 1 tablet (25 mg) by mouth 2 times daily        Rhiannon Murcia is a 39 year old, presenting for the following health issues:  Dizziness        4/15/2024     1:32 PM   Additional Questions   Roomed by rmb   Accompanied by staff         4/15/2024     1:32 PM   Patient Reported Additional Medications   Patient reports taking the following new medications none     History of Present Illness       Reason for visit:  Dizzy spells    She eats 0-1 servings of fruits and vegetables daily.She consumes 1 sweetened beverage(s) daily.She exercises with enough effort to increase her heart rate 9 or less minutes per day.  She exercises with enough effort to increase her heart rate 4 days per week.   She is taking medications regularly.       Dizziness  Onset/Duration: a couple days ago  Description:   Do you feel faint: YES  Does it feel like the surroundings (bed, room) are moving:  "yes  Unsteady/off balance: YES  Have you passed out or fallen: No  Intensity: severe  Progression of Symptoms: worsening  Accompanying Signs & Symptoms:  Heart palpitations or chest pain: No  Nausea, vomiting: No  Weakness or lack of coordination in arms or legs: YES  Vision or speech changes: YES  Numbness or tingling: No  Ringing in ears (Tinnitus): No  Hearing Loss: No  History:   Head trauma/concussion history: No  Previous similar symptoms: YES  Recent bleeding history: No  Any new medications (BP?): No  Precipitating factors:   Worse with activity: No  Worse with head movement: No  Alleviating factors:   Does staying in a fixed position give relief: YES  Therapies tried and outcome: None        Review of Systems  Constitutional, HEENT, cardiovascular, pulmonary, gi and gu systems are negative, except as otherwise noted.      Objective    /68   Pulse 83   Temp 99.5  F (37.5  C) (Tympanic)   Resp 16   Ht 1.638 m (5' 4.5\")   Wt 100.5 kg (221 lb 9.6 oz)   SpO2 98%   BMI 37.45 kg/m    Body mass index is 37.45 kg/m .  Physical Exam   GENERAL: alert and no distress  EYES: Eyes grossly normal to inspection, PERRL and conjunctivae and sclerae normal  HENT: ear canals and TM's normal, nose and mouth without ulcers or lesions  NECK: no adenopathy, no asymmetry, masses, or scars  RESP: lungs clear to auscultation - no rales, rhonchi or wheezes  CV: regular rate and rhythm, normal S1 S2, no S3 or S4, no murmur, click or rub, no peripheral edema   SKIN: no suspicious lesions or rashes  NEURO: Normal strength and tone, mentation intact and speech normal  PSYCH: mentation appears normal, affect normal/bright    Results for orders placed or performed in visit on 04/15/24   Basic metabolic panel  (Ca, Cl, CO2, Creat, Gluc, K, Na, BUN)     Status: Abnormal   Result Value Ref Range    Sodium 142 135 - 145 mmol/L    Potassium 4.7 3.4 - 5.3 mmol/L    Chloride 106 98 - 107 mmol/L    Carbon Dioxide (CO2) 24 22 - 29 " mmol/L    Anion Gap 12 7 - 15 mmol/L    Urea Nitrogen 23.9 (H) 6.0 - 20.0 mg/dL    Creatinine 1.57 (H) 0.51 - 0.95 mg/dL    GFR Estimate 43 (L) >60 mL/min/1.73m2    Calcium 8.7 8.6 - 10.0 mg/dL    Glucose 164 (H) 70 - 99 mg/dL   CBC with platelets and differential     Status: Abnormal   Result Value Ref Range    WBC Count 13.9 (H) 4.0 - 11.0 10e3/uL    RBC Count 4.05 3.80 - 5.20 10e6/uL    Hemoglobin 11.0 (L) 11.7 - 15.7 g/dL    Hematocrit 37.0 35.0 - 47.0 %    MCV 91 78 - 100 fL    MCH 27.2 26.5 - 33.0 pg    MCHC 29.7 (L) 31.5 - 36.5 g/dL    RDW 15.8 (H) 10.0 - 15.0 %    Platelet Count 482 (H) 150 - 450 10e3/uL    % Neutrophils 62 %    % Lymphocytes 30 %    % Monocytes 5 %    % Eosinophils 2 %    % Basophils 1 %    % Immature Granulocytes 1 %    Absolute Neutrophils 8.6 (H) 1.6 - 8.3 10e3/uL    Absolute Lymphocytes 4.1 0.8 - 5.3 10e3/uL    Absolute Monocytes 0.7 0.0 - 1.3 10e3/uL    Absolute Eosinophils 0.2 0.0 - 0.7 10e3/uL    Absolute Basophils 0.1 0.0 - 0.2 10e3/uL    Absolute Immature Granulocytes 0.1 <=0.4 10e3/uL   CBC with platelets and differential     Status: Abnormal    Narrative    The following orders were created for panel order CBC with platelets and differential.  Procedure                               Abnormality         Status                     ---------                               -----------         ------                     CBC with platelets and d...[[id number]]  Abnormal            Final result                 Please view results for these tests on the individual orders.           Signed Electronically by: VERONIQUE Spears CNP

## 2024-04-16 RX ORDER — TOPIRAMATE 25 MG/1
25 TABLET, FILM COATED ORAL 2 TIMES DAILY
Qty: 60 TABLET | Refills: 3 | Status: SHIPPED | OUTPATIENT
Start: 2024-04-16 | End: 2024-05-09

## 2024-04-24 ENCOUNTER — OFFICE VISIT (OUTPATIENT)
Dept: FAMILY MEDICINE | Facility: CLINIC | Age: 39
End: 2024-04-24
Payer: COMMERCIAL

## 2024-04-24 VITALS
SYSTOLIC BLOOD PRESSURE: 128 MMHG | RESPIRATION RATE: 18 BRPM | OXYGEN SATURATION: 98 % | BODY MASS INDEX: 37.73 KG/M2 | HEIGHT: 64 IN | WEIGHT: 221 LBS | HEART RATE: 66 BPM | DIASTOLIC BLOOD PRESSURE: 70 MMHG | TEMPERATURE: 98.2 F

## 2024-04-24 DIAGNOSIS — H65.191 ACUTE EFFUSION OF RIGHT EAR: ICD-10-CM

## 2024-04-24 DIAGNOSIS — E78.5 HYPERLIPIDEMIA LDL GOAL <100: ICD-10-CM

## 2024-04-24 DIAGNOSIS — R42 DIZZINESS: ICD-10-CM

## 2024-04-24 DIAGNOSIS — F25.0 SCHIZOAFFECTIVE DISORDER, BIPOLAR TYPE (H): ICD-10-CM

## 2024-04-24 DIAGNOSIS — M25.572 PAIN IN JOINT INVOLVING ANKLE AND FOOT, LEFT: ICD-10-CM

## 2024-04-24 DIAGNOSIS — Z12.4 CERVICAL CANCER SCREENING: ICD-10-CM

## 2024-04-24 DIAGNOSIS — Z00.00 ANNUAL PHYSICAL EXAM: Primary | ICD-10-CM

## 2024-04-24 LAB
ANION GAP SERPL CALCULATED.3IONS-SCNC: 10 MMOL/L (ref 7–15)
BASOPHILS # BLD AUTO: 0.1 10E3/UL (ref 0–0.2)
BASOPHILS NFR BLD AUTO: 1 %
BUN SERPL-MCNC: 24.1 MG/DL (ref 6–20)
CALCIUM SERPL-MCNC: 9 MG/DL (ref 8.6–10)
CHLORIDE SERPL-SCNC: 112 MMOL/L (ref 98–107)
CHOLEST SERPL-MCNC: 192 MG/DL
CREAT SERPL-MCNC: 1.36 MG/DL (ref 0.51–0.95)
DEPRECATED HCO3 PLAS-SCNC: 20 MMOL/L (ref 22–29)
EGFRCR SERPLBLD CKD-EPI 2021: 51 ML/MIN/1.73M2
EOSINOPHIL # BLD AUTO: 0.2 10E3/UL (ref 0–0.7)
EOSINOPHIL NFR BLD AUTO: 2 %
ERYTHROCYTE [DISTWIDTH] IN BLOOD BY AUTOMATED COUNT: 15.8 % (ref 10–15)
FASTING STATUS PATIENT QL REPORTED: YES
GLUCOSE SERPL-MCNC: 121 MG/DL (ref 70–99)
HCT VFR BLD AUTO: 37.9 % (ref 35–47)
HDLC SERPL-MCNC: 51 MG/DL
HGB BLD-MCNC: 11.1 G/DL (ref 11.7–15.7)
IMM GRANULOCYTES # BLD: 0 10E3/UL
IMM GRANULOCYTES NFR BLD: 0 %
LDLC SERPL CALC-MCNC: 117 MG/DL
LYMPHOCYTES # BLD AUTO: 3.9 10E3/UL (ref 0.8–5.3)
LYMPHOCYTES NFR BLD AUTO: 27 %
MCH RBC QN AUTO: 26.9 PG (ref 26.5–33)
MCHC RBC AUTO-ENTMCNC: 29.3 G/DL (ref 31.5–36.5)
MCV RBC AUTO: 92 FL (ref 78–100)
MONOCYTES # BLD AUTO: 0.8 10E3/UL (ref 0–1.3)
MONOCYTES NFR BLD AUTO: 5 %
NEUTROPHILS # BLD AUTO: 9.6 10E3/UL (ref 1.6–8.3)
NEUTROPHILS NFR BLD AUTO: 66 %
NONHDLC SERPL-MCNC: 141 MG/DL
PLATELET # BLD AUTO: 478 10E3/UL (ref 150–450)
POTASSIUM SERPL-SCNC: 4.8 MMOL/L (ref 3.4–5.3)
RBC # BLD AUTO: 4.13 10E6/UL (ref 3.8–5.2)
SODIUM SERPL-SCNC: 142 MMOL/L (ref 135–145)
TRIGL SERPL-MCNC: 120 MG/DL
WBC # BLD AUTO: 14.6 10E3/UL (ref 4–11)

## 2024-04-24 PROCEDURE — 80061 LIPID PANEL: CPT | Performed by: NURSE PRACTITIONER

## 2024-04-24 PROCEDURE — 80048 BASIC METABOLIC PNL TOTAL CA: CPT | Performed by: NURSE PRACTITIONER

## 2024-04-24 PROCEDURE — 36415 COLL VENOUS BLD VENIPUNCTURE: CPT | Performed by: NURSE PRACTITIONER

## 2024-04-24 PROCEDURE — 87624 HPV HI-RISK TYP POOLED RSLT: CPT | Performed by: NURSE PRACTITIONER

## 2024-04-24 PROCEDURE — 85025 COMPLETE CBC W/AUTO DIFF WBC: CPT | Performed by: NURSE PRACTITIONER

## 2024-04-24 PROCEDURE — 99213 OFFICE O/P EST LOW 20 MIN: CPT | Mod: 25 | Performed by: NURSE PRACTITIONER

## 2024-04-24 PROCEDURE — G0439 PPPS, SUBSEQ VISIT: HCPCS | Performed by: NURSE PRACTITIONER

## 2024-04-24 PROCEDURE — G0145 SCR C/V CYTO,THINLAYER,RESCR: HCPCS | Performed by: NURSE PRACTITIONER

## 2024-04-24 RX ORDER — FLUTICASONE PROPIONATE 50 MCG
1 SPRAY, SUSPENSION (ML) NASAL DAILY
Qty: 16 G | Refills: 0 | Status: SHIPPED | OUTPATIENT
Start: 2024-04-24 | End: 2024-05-09

## 2024-04-24 RX ORDER — ATORVASTATIN CALCIUM 20 MG/1
20 TABLET, FILM COATED ORAL DAILY
Qty: 90 TABLET | Refills: 3 | Status: SHIPPED | OUTPATIENT
Start: 2024-04-24

## 2024-04-24 SDOH — HEALTH STABILITY: PHYSICAL HEALTH: ON AVERAGE, HOW MANY DAYS PER WEEK DO YOU ENGAGE IN MODERATE TO STRENUOUS EXERCISE (LIKE A BRISK WALK)?: 2 DAYS

## 2024-04-24 ASSESSMENT — PAIN SCALES - GENERAL: PAINLEVEL: NO PAIN (0)

## 2024-04-24 ASSESSMENT — SOCIAL DETERMINANTS OF HEALTH (SDOH): HOW OFTEN DO YOU GET TOGETHER WITH FRIENDS OR RELATIVES?: MORE THAN THREE TIMES A WEEK

## 2024-04-24 NOTE — ADDENDUM NOTE
"    Admission Medication History     The home medication history was taken by Meera Infante CPhT.    Medication history obtained from, Patient Verified     You may go to "Admission" then "Reconcile Home Medications" tabs to review and/or act upon these items.      The home medication list has been updated by the Pharmacy department.    Please read ALL comments highlighted in yellow.    Please address this information as you see fit.     Feel free to contact us if you have any questions or require assistance.      The medications listed below were removed from the home medication list.  Please reorder if appropriate:  Patient reports no longer taking the following medication(s):   Tylenol 325 mg   Norvasc 5 mg   Valium 2 mg   Vitamin D 50,000 units   Vistaril 50 mg   Megestrol 40 mg    Morphine 2 mg/ml injection   Bactroban 2% ointment   Zofran ODT 4 mg   Potassium Chloride 20 mEq    Prednisone 20 mg   Lyrica 25 mg   Sodium Bicarbonate 650 mg          Meera Infante CPhT.  Ext [phone]              .          " Addended by: FRANKY WASHINGTON on: 4/24/2024 12:54 PM     Modules accepted: Orders

## 2024-04-24 NOTE — PROGRESS NOTES
Preventive Care Visit  [facility]  VERONIQUE Spears CNP, Family Medicine      Assessment & Plan     Annual physical exam    - Pap Screen with HPV - recommended age 30 - 65 years  - Lipid panel reflex to direct LDL Fasting; Future  - Lipid panel reflex to direct LDL Fasting    Cervical cancer screening    - Pap Screen with HPV - recommended age 30 - 65 years    Acute effusion of right ear    - recommended to try fluticasone (FLONASE) 50 MCG/ACT nasal spray; Spray 1 spray into both nostrils daily  - follow up with ENT if no improvement Adult ENT  Referral; Future    Schizoaffective disorder, bipolar type (H)  -following with psychiatrist, patient reports she is doing well   - CBC with Platelets & Differential    Dizziness  -resolved with use of Meclizine   - Basic metabolic panel  (Ca, Cl, CO2, Creat, Gluc, K, Na, BUN)    Pain in joint involving ankle and foot, left  -probably related to problems with gait, ongoing since femur surgery for fracture, Patient had more pain after walking on the weekend, better now  -if continue to have issues will plan physical therapy     Counseling  Appropriate preventive services were discussed with this patient, including applicable screening as appropriate for fall prevention, nutrition, physical activity, Tobacco-use cessation, weight loss and cognition.  Checklist reviewing preventive services available has been given to the patient.  Reviewed patient's diet, addressing concerns and/or questions.   She is at risk for lack of exercise and has been provided with information to increase physical activity for the benefit of her well-being.   The patient was instructed to see the dentist every 6 months.   Updated plan of care.  Patient reported difficulty with activities of daily living were addressed today.The patient was provided with written information regarding signs of hearing loss.       Rhiannon Murcia is a 39 year old, presenting  for the following:  Physical (Pt is fasting.)        4/24/2024    10:41 AM   Additional Questions   Roomed by Ena Lawrence   Accompanied by Cyrus         4/24/2024    10:41 AM   Patient Reported Additional Medications   Patient reports taking the following new medications none       Health Care Directive  Patient has a Health Care Directive on file  Advance care planning document is on file and is current.    HPI        4/24/2024   General Health   How would you rate your overall physical health? Good   Feel stress (tense, anxious, or unable to sleep) Not at all         4/24/2024   Nutrition   Diet: Low salt    Diabetic         4/24/2024   Exercise   Days per week of moderate/strenous exercise 2 days   (!) EXERCISE CONCERN      4/24/2024   Social Factors   Frequency of gathering with friends or relatives More than three times a week   Worry food won't last until get money to buy more No   Food not last or not have enough money for food? No   Do you have housing?  Yes   Are you worried about losing your housing? No   Lack of transportation? No   Unable to get utilities (heat,electricity)? No         4/24/2024   Fall Risk   Fallen 2 or more times in the past year? Yes   Trouble with walking or balance? Yes         4/24/2024   Activities of Daily Living- Home Safety   Needs help with the following daily activites Transportation    Shopping    Housework    Laundry    Medication administration    Money management   Safety concerns in the home None of the above         4/24/2024   Dental   Dentist two times every year? (!) NO         4/24/2024   Hearing Screening   Hearing concerns? (!) I NEED TO ASK PEOPLE TO SPEAK UP OR REPEAT THEMSELVES.    (!) TROUBLE UNDERSTANDING SOFT OR WHISPERED SPEECH.         4/24/2024   Driving Risk Screening   Patient/family members have concerns about driving No         4/24/2024   General Alertness/Fatigue Screening   Have you been more tired than usual lately? No         4/24/2024    Urinary Incontinence Screening   Bothered by leaking urine in past 6 months No         2024   TB Screening   Were you born outside of the [place]? No           2024   Substance Use   Alcohol more than 3/day or more than 7/wk No   Do you have a current opioid prescription? No   How severe/bad is pain from 1 to 10? 3/10   Do you use any other substances recreationally? No     Social History     Tobacco Use    Smoking status: Former     Current packs/day: 0.00     Average packs/day: 0.5 packs/day for 0.2 years (0.1 ttl pk-yrs)     Types: Cigarettes     Start date: 2023     Quit date: 2024     Years since quittin.1     Passive exposure: Yes    Smokeless tobacco: Never    Tobacco comments:     A pack every 3 days   Vaping Use    Vaping status: Every Day    Substances: Nicotine, Flavoring    Devices: Disposable   Substance Use Topics    Alcohol use: No     Comment: She is in AA and just got her 18 month medalion (2024)    Drug use: No           History of abnormal Pap smear: NO - age 30-65 PAP every 5 years with negative HPV co-testing recommended        Latest Ref Rng & Units 3/26/2021     9:18 AM 3/26/2021     9:00 AM 2018     3:29 PM   PAP / HPV   PAP (Historical)  NIL   NIL    HPV 16 DNA NEG^Negative  Negative  Negative    HPV 18 DNA NEG^Negative  Negative  Negative    Other HR HPV NEG^Negative  Negative  Negative            2024   Contraception/Family Planning   Questions about contraception or family planning (!) YES needs to remove IUD in 1 year      Reviewed and updated as needed this visit by Provider                    Current providers sharing in care for this patient include:  Patient Care Team:  Jaleesa Velasquez APRN CNP as PCP - General (Nurse Practitioner - Gerontology)  Laci Kruse as Therapist  Patrica Vargas as Psychiatrist  [facility] as   Lucia Caicedo as Critical access hospital worker  Dolly Riley RD as Diabetes Educator (Diabetes  Education)  Jaleesa Velasquez APRN CNP as Assigned PCP  Sonja Bueno PA-C as Physician Assistant (Physician Assistant)  Sánchez Dias DO as Assigned Nephrology Provider  Dolly Riley RD as Diabetes Educator (Dietitian, Registered)  Emanuel Mari MD as MD (Endocrinology, Diabetes, and Metabolism)  Leventhal, Thomas Michael, MD as Assigned Gastroenterology Provider  Dariana Westbrook MD as Assigned OBGYN Provider  Christophe Beckman MD as Physician (Endocrinology, Diabetes, and Metabolism)  Shelli Watts PA-C as Physician Assistant  Francoise Lehman NP as Nurse Practitioner (Neurological Surgery)  Francoise Lehman NP as Assigned Neuroscience Provider  Christophe Beckman MD as Assigned Endocrinology Provider  Lyn Herman MD as MD (Cardiovascular Disease)  Harvinder Unger MD (Internal Medicine)  Leana Hollis RP as Pharmacist (Pharmacist)  Leana Hollis RPH as Assigned MTM Pharmacist  Oskar Lorenzo MD as MD (Hematology & Oncology)  Lyn Herman MD as Assigned Heart and Vascular Provider  Raul Douglas DPM as Assigned Surgical Provider  Jimmie Nino MD as Assigned Musculoskeletal Provider  No Ref-Primary, Physician    The following health maintenance items are reviewed in Epic and correct as of today:  Health Maintenance   Topic Date Due    DIABETIC FOOT EXAM  10/24/2020    EYE EXAM  12/22/2022    LIPID  12/21/2023    Medicare Annual MTM Pharmacist Visit (once per calendar year)  01/01/2024    MEDICARE ANNUAL WELLNESS VISIT  03/22/2024    PAP FOLLOW-UP  03/26/2024    HPV FOLLOW-UP  03/26/2024    ASTHMA ACTION PLAN  08/02/2024    A1C  09/29/2024    ASTHMA CONTROL TEST  09/29/2024    ANNUAL REVIEW OF HM ORDERS  12/13/2024    DTAP/TDAP/TD IMMUNIZATION (3 - Td or Tdap) 01/26/2025    MICROALBUMIN  03/29/2025    BMP  04/15/2025    HEMOGLOBIN  04/15/2025    MENINGITIS IMMUNIZATION (3 - Risk 2-dose series) 07/06/2027    ADVANCE CARE PLANNING   "03/22/2028    HEPATITIS C SCREENING  Completed    HIV SCREENING  Completed    PHQ-2 (once per calendar year)  Completed    INFLUENZA VACCINE  Completed    Pneumococcal Vaccine: Pediatrics (0 to 5 Years) and At-Risk Patients (6 to 64 Years)  Completed    URINALYSIS  Completed    HEPATITIS B IMMUNIZATION  Completed    COVID-19 Vaccine  Completed    IPV IMMUNIZATION  Aged Out    HPV IMMUNIZATION  Aged Out    RSV MONOCLONAL ANTIBODY  Aged Out    PAP  Discontinued         Review of Systems  Constitutional, HEENT, cardiovascular, pulmonary, gi and gu systems are negative, except as otherwise noted.     Objective    Exam  /70   Pulse 66   Temp 98.2  F (36.8  C) (Tympanic)   Resp 18   Ht 1.626 m (5' 4\")   Wt 100.2 kg (221 lb)   SpO2 98%   BMI 37.93 kg/m     Estimated body mass index is 37.93 kg/m  as calculated from the following:    Height as of this encounter: 1.626 m (5' 4\").    Weight as of this encounter: 100.2 kg (221 lb).    Physical Exam  GENERAL: alert and no distress  EYES: Eyes grossly normal to inspection, PERRL and conjunctivae and sclerae normal  HENT: normal cephalic/atraumatic, right ear: clear effusion, and left ear: clear effusion  NECK: no adenopathy, no asymmetry, masses, or scars  RESP: lungs clear to auscultation - no rales, rhonchi or wheezes  CV: regular rate and rhythm, normal S1 S2, no S3 or S4, no murmur, click or rub, no peripheral edema    (female) w/bimanual: normal female external genitalia, normal urethral meatus, normal vaginal mucosa, and normal cervix/adnexa/uterus without masses or discharge  MS: no gross musculoskeletal defects noted, no edema  SKIN: no suspicious lesions or rashes  NEURO: Normal strength and tone, mentation intact and speech normal  PSYCH: mentation appears normal, affect normal/bright        4/24/2024   Mini Cog   Mini-Cog Not Completed (choose reason) Mental handicap          Signed Electronically by: VERONIQUE Spears CNP    "

## 2024-04-26 DIAGNOSIS — I10 ESSENTIAL (PRIMARY) HYPERTENSION: ICD-10-CM

## 2024-04-26 DIAGNOSIS — E11.42 TYPE 2 DIABETES MELLITUS WITH DIABETIC POLYNEUROPATHY (H): ICD-10-CM

## 2024-04-26 RX ORDER — LISINOPRIL 5 MG/1
5 TABLET ORAL DAILY
Qty: 30 TABLET | Refills: 11 | Status: SHIPPED | OUTPATIENT
Start: 2024-04-26 | End: 2024-05-09

## 2024-04-26 NOTE — TELEPHONE ENCOUNTER
Pending Prescriptions:                       Disp   Refills    lisinopril (ZESTRIL) 5 MG tablet [Pharmacy*30 tab*11       Sig: Take 1 tablet (5 mg) by mouth daily    Routing refill request to provider for review/approval because:  Medication is reported/historical    Sapphire Bejarano RN  [facility]

## 2024-04-27 LAB
BKR LAB AP GYN ADEQUACY: NORMAL
BKR LAB AP GYN INTERPRETATION: NORMAL
BKR LAB AP HPV REFLEX: NORMAL
BKR LAB AP PREVIOUS ABNORMAL: NORMAL
PATH REPORT.COMMENTS IMP SPEC: NORMAL
PATH REPORT.COMMENTS IMP SPEC: NORMAL
PATH REPORT.RELEVANT HX SPEC: NORMAL

## 2024-04-29 LAB
HUMAN PAPILLOMA VIRUS 16 DNA: NEGATIVE
HUMAN PAPILLOMA VIRUS 18 DNA: NEGATIVE
HUMAN PAPILLOMA VIRUS FINAL DIAGNOSIS: ABNORMAL
HUMAN PAPILLOMA VIRUS OTHER HR: POSITIVE

## 2024-04-30 ENCOUNTER — PATIENT OUTREACH (OUTPATIENT)
Dept: FAMILY MEDICINE | Facility: CLINIC | Age: 39
End: 2024-04-30
Payer: COMMERCIAL

## 2024-05-06 ENCOUNTER — ANCILLARY PROCEDURE (OUTPATIENT)
Dept: GENERAL RADIOLOGY | Facility: CLINIC | Age: 39
End: 2024-05-06
Attending: ORTHOPAEDIC SURGERY
Payer: COMMERCIAL

## 2024-05-06 ENCOUNTER — OFFICE VISIT (OUTPATIENT)
Dept: ORTHOPEDICS | Facility: CLINIC | Age: 39
End: 2024-05-06
Payer: COMMERCIAL

## 2024-05-06 ENCOUNTER — MYC MEDICAL ADVICE (OUTPATIENT)
Dept: FAMILY MEDICINE | Facility: CLINIC | Age: 39
End: 2024-05-06

## 2024-05-06 VITALS
DIASTOLIC BLOOD PRESSURE: 75 MMHG | SYSTOLIC BLOOD PRESSURE: 139 MMHG | HEART RATE: 71 BPM | HEIGHT: 64 IN | BODY MASS INDEX: 38.07 KG/M2 | WEIGHT: 223 LBS

## 2024-05-06 DIAGNOSIS — S72.302D CLOSED FRACTURE OF SHAFT OF LEFT FEMUR WITH ROUTINE HEALING, SUBSEQUENT ENCOUNTER: Primary | ICD-10-CM

## 2024-05-06 DIAGNOSIS — S72.302D CLOSED FRACTURE OF SHAFT OF LEFT FEMUR WITH ROUTINE HEALING, SUBSEQUENT ENCOUNTER: ICD-10-CM

## 2024-05-06 PROCEDURE — 73552 X-RAY EXAM OF FEMUR 2/>: CPT | Mod: TC | Performed by: RADIOLOGY

## 2024-05-06 PROCEDURE — 99213 OFFICE O/P EST LOW 20 MIN: CPT | Performed by: ORTHOPAEDIC SURGERY

## 2024-05-06 ASSESSMENT — PAIN SCALES - GENERAL: PAINLEVEL: NO PAIN (0)

## 2024-05-06 NOTE — LETTER
5/6/2024         RE: Divina Delgadillo  [address] [facility] [address]        Dear Colleague,    Thank you for referring your patient, Divina Delgadillo, to the [facility]. Please see a copy of my visit note below.    CHIEF COMPLAINT:   Chief Complaint   Patient presents with     Left Thigh - RECHECK     Femur fracture - IMN. DOS 1/21/24, 15 wk s/p. Patient notes her femur is doing good. It is stiff but doesn't hurt anymore. She can walk normal now. She doesn't have a limp anymore. She would like to discuss lifting her restrictions for work.          SURGICAL PROCEDURE: left femur fracture IMN ([place])  DATE OF PROCEDURE: 1/22/2024      HISTORY OF PRESENT ILLNESS    Divina Delgadillo is a 39 year old female seen for postoperative follow-up of a left femur fracture intramedullary nailing that occurred 15 weeks ago. Returns today doing well. A little stiff but no pain. Doesn't really have a limp anymore. She'd like to discuss return to work without restrictions.    Doing home exercise program, stopped going to therapy. No concerns today.      Recall injury when she was in [place], on [place] sidewalk at the airport and her boot caught and went flying into the air and landed on her left side.  She was taken to a hospital in [place], noted to have a left femur fracture, and had an intramedullary nail placed.      She is diabetic, A1c=9.8 on 3/29/2024      Other PMH:  has a past medical history of Acquired asplenia, Acute pain of left knee (03/22/2019), Acute-on-chronic kidney injury  (H24) (03/22/2019), ARF (acute renal failure) (H24) (03/23/2019), Asthma, Bipolar disorder (H), Cardiac murmur, Cervical high risk HPV (human papillomavirus) test positive (06/20/2017), Chiari malformation type I (H), Chronic bilateral low back pain without sciatica, Deep venous thrombosis of arm (H) (01/06/2017), Depressive disorder, DVT (deep venous thrombosis) (H), DVT of upper extremity  (deep vein thrombosis) (H) (2021), Esophageal reflux, Hypertension, Insomnia, Leukocytosis, Mild intermittent asthma, Morbid obesity (H), Mucinous neoplasm of pancreas, NAFL (nonalcoholic fatty liver), Neck pain, Nicotine abuse, Other specified types of schizophrenia, unspecified condition, Schizoaffective disorder, depressive type (H), Stage 3a chronic kidney disease (CKD) (H), Type 2 diabetes mellitus (H), Ulnar neuropathy of both upper extremities, and Vitamin D insufficiency.    She has no past medical history of Arthritis, Cerebral infarction (H), Congestive heart failure (H), COPD (chronic obstructive pulmonary disease) (H), Heart disease, History of blood transfusion, or Thyroid disease.  Patient Active Problem List   Diagnosis     Esophageal reflux     NAFL (nonalcoholic fatty liver)     Essential hypertension     Hyperlipidemia LDL goal <100     Stage 3 chronic kidney disease     Mucinous neoplasm of pancreas     Type 2 diabetes mellitus with stage 3 chronic kidney disease, with long-term current use of insulin (H)     Bipolar disorder (H)     Cervical high risk HPV (human papillomavirus) test positive     IUD (intrauterine device) in place     Morbid obesity (H)     Mild intermittent asthma with acute exacerbation     Nicotine abuse     Chronic leukocytosis     Acquired asplenia     Borderline personality disorder (H)     PTSD (post-traumatic stress disorder)     Long term (current) use of anticoagulants     Orthostatic hypotension     Tobacco abuse     Vitamin D insufficiency     Depressive disorder     Schizoaffective disorder, depressive type (H)     Cardiac murmur     History of DVT of arm  (deep vein thrombosis)     Insomnia, unspecified type     Ulnar neuropathy of both upper extremities     Chiari malformation type I (H)     Deep venous thrombosis of arm (H)     Neutrophilic leukocytosis     Other fracture of left femur, initial encounter for closed fracture (H)       Surgical Hx:  has a past  surgical history that includes ENT surgery (10/01/2000); tonsillectomy (10/01/2000); splenectomy (2015); Adrenalectomy (Left, 2015); Pancreatectomy partial (2015); Percutaneous biopsy liver (Right, 11/14/2019); IR Liver Biopsy Percutaneous (11/14/2019); biopsy; Breast surgery (2013); and colonoscopy.    Medications:   Current Outpatient Medications:      ACCU-CHEK GUIDE test strip, Use to test blood sugar 3 times daily or as directed., Disp: 150 strip, Rfl: 1     acetaminophen (TYLENOL) 325 MG tablet, Take 650 mg by mouth every 4 hours as needed for pain or fever 2 tab every 4-6 hours as needed, do not exceed 9 tabs in 24hours, Disp: , Rfl:      albuterol (VENTOLIN HFA) 108 (90 Base) MCG/ACT inhaler, INHALE 2 PUFFS INTO THE LUNGS EVERY SIX  HOURS AS NEEDED FOR SHORTNESS OF BREATH, Disp: 18 g, Rfl: 10     alendronate (FOSAMAX) 70 MG tablet, Take 1 tablet (70 mg) by mouth every 7 days, Disp: 12 tablet, Rfl: 0     ARIPiprazole (ABILIFY) 30 MG tablet, Take 30 mg by mouth daily, Disp: , Rfl:      atorvastatin (LIPITOR) 20 MG tablet, Take 1 tablet (20 mg) by mouth daily, Disp: 90 tablet, Rfl: 3     Biotin (BIOTIN FORTE) 5 MG TABS, Take 1 tablet by mouth daily, Disp: 60 tablet, Rfl: 1     blood glucose (ACCU-CHEK GUIDE) test strip, Use to test blood sugar 3 times daily or as directed., Disp: 100 strip, Rfl: 11     bumetanide (BUMEX) 0.5 MG tablet, Take 1 tablet (0.5 mg) by mouth daily, Disp: 30 tablet, Rfl: 2     carvedilol (COREG) 12.5 MG tablet, Take 1 tablet (12.5 mg) by mouth 2 times daily, Disp: 60 tablet, Rfl: 11     cholecalciferol 50 MCG (2000 UT) CAPS, Take 2,000 Units by mouth, Disp: , Rfl:      cloZAPine (CLOZARIL) 100 MG tablet, Take 100 mg by mouth One in the morning, 2 at night, Disp: , Rfl:      FLUoxetine (PROZAC) 40 MG capsule, Take 40 mg by mouth daily, Disp: , Rfl:      fluticasone (FLONASE) 50 MCG/ACT nasal spray, Spray 1 spray into both nostrils daily, Disp: 16 g, Rfl: 0     glucose (BD GLUCOSE) 4 g  "chewable tablet, Take 1 tablet by mouth every hour as needed for low blood sugar, Disp: 30 tablet, Rfl: 4     Insulin Aspart FlexPen 100 UNIT/ML SOPN, Inject 1-14 Units Subcutaneous 3 times daily (before meals) Inject 3 times daily with meals as instructed.  Max TDD 30., Disp: 30 mL, Rfl: 4     Insulin Infusion Pump (MINIMED 780G INSULIN PUMP) KIT, 1 each daily SmartGuard Target: 100; Active Insulin Time: 2 hours (recommended); SmartGuardTM Warmup/Manual Mode: Suspend before low (recommended), Disp: 1 kit, Rfl: 0     Insulin Infusion Pump Supplies (EXTENDED INFUSION SET 23\"/6MM) MISC, 1 each every 7 days Max Total Daily Dose 70 units; Change infusion set & reservoir every 7 days (recommended), Disp: 10 each, Rfl: 6     Insulin Infusion Pump Supplies (EXTENDED RESERVOIR 3ML) MISC, 1 each every 7 days, Disp: 10 each, Rfl: 11     insulin lispro (HUMALOG VIAL) 100 UNIT/ML vial, Inject 0-10 Units Subcutaneous, Disp: , Rfl:      lamoTRIgine (LAMICTAL) 100 MG tablet, Take 100 mg by mouth At Bedtime, Disp: , Rfl:      lisinopril (ZESTRIL) 5 MG tablet, Take 1 tablet (5 mg) by mouth daily, Disp: 30 tablet, Rfl: 11     loratadine (CLARITIN) 10 MG tablet, Take 1 tablet (10 mg) by mouth every morning, Disp: 30 tablet, Rfl: 9     meclizine (ANTIVERT) 25 MG tablet, Take 1 tablet (25 mg) by mouth 3 times daily as needed for dizziness, Disp: 30 tablet, Rfl: 0     omeprazole (PRILOSEC) 20 MG DR capsule, TAKE 1 CAPSULE BY MOUTH EVERY DAY, Disp: 30 capsule, Rfl: 2     pantoprazole (PROTONIX) 40 MG EC tablet, Take 40 mg by mouth, Disp: , Rfl:      Suvorexant (BELSOMRA) 10 MG tablet, Take 10 mg by mouth at bedtime, Disp: , Rfl:      topiramate (TOPAMAX) 25 MG tablet, Take 1 tablet (25 mg) by mouth 2 times daily, Disp: 60 tablet, Rfl: 3     ULTICARE MINI 31G X 6 MM insulin pen needle, USE 1 PEN NEEDLE 4 times daily, Disp: , Rfl:     Allergies:   Allergies   Allergen Reactions     Acetaminophen Other (See Comments)     pt previously tried " "to overdose on it, PMD does not want pt taking per pt.        Social Hx:  reports that she quit smoking about 2 months ago. Her smoking use included cigarettes. She started smoking about 5 months ago. She has a 0.1 pack-year smoking history. She has been exposed to tobacco smoke. She has never used smokeless tobacco. She reports that she does not drink alcohol and does not use drugs.    Family Hx: family history includes Allergies in her brother, mother, sister, and sister; Anxiety Disorder in her father and mother; Asthma in her mother; Chronic Obstructive Pulmonary Disease in her mother; Depression in her father, mother, sister, and sister; Diabetes in her father; Heart Disease in her father; Hypertension in her father; Mental Illness in her father and mother; Obesity in her father; Respiratory in her brother, mother, sister, and sister; Sleep Apnea in her maternal grandfather.    REVIEW OF SYSTEMS:  CONSTITUTIONAL:NEGATIVE for fever, chills, change in weight  INTEGUMENTARY/SKIN: NEGATIVE for worrisome rashes, moles or lesions  MUSCULOSKELETAL:See HPI above  NEURO: NEGATIVE for weakness, dizziness or paresthesias      PHYSICAL EXAM:  /75   Pulse 71   Ht 1.626 m (5' 4\")   Wt 101.2 kg (223 lb)   BMI 38.28 kg/m     GENERAL APPEARANCE: healthy, alert, no distress    SKIN: no suspicious lesions or rashes  NEURO: Normal strength and tone, mentation intact and speech normal  PSYCH:  mentation appears normal and affect normal  RESPIRATORY: No increased work of breathing.          left LOWER EXTREMITY EXAM:  Gait: tberg, slight favors the left.  Intact sensation deep peroneal nerve, superficial peroneal nerve, med/lat tibial nerve, sural nerve, saphenous nerve  Intact EHL, EDL, TA, FHL, GS, quadriceps hamstrings and hip flexors  Mild left quadriceps weakness.  calf soft and nttp or squeeze.  Edema: 1+    Wound / Incision: multiple surgical wounds healed well. Dry. No drainage. no erythema.  Inspection; slight " swelling of the entire left lower extremity compared to the right.  Palpation: minimal over the quads, iliotibial band.  Non-tender: knee  Strength: grossly intact, weak.   No discomfort with hip range of motion.      X-RAY:  2 views left femur from 5/6/2024  were reviewed in clinic today. Stable alignment of left proximal femoral shaft fracture with increased periosteal callus formation and decreased fracture lucency about the fracture site. There is soft tissue swelling. Hardware appears in place.           Impression: 39 year old female 15 weeks  postoperative intramedullary nailing of  left femoral shaft fracture , doing well.    Plan:   Images reviewed. Fracture essentially healed.  Weight bearing status: weight bearing as tolerated    Pain control: over the counter as needed.  Immobilzation: none.  Continue with  home exercise program.  Elevation of affected extremity  Return to clinic as needed.  Workability: ok to return to work without restrictions.  Advised better diabetic control.      Jimmie Nino M.D., M.S.  Dept. of Orthopaedic Surgery  [facility]             Again, thank you for allowing me to participate in the care of your patient.        Sincerely,        Jimmie Nino MD

## 2024-05-06 NOTE — PROGRESS NOTES
CHIEF COMPLAINT:   Chief Complaint   Patient presents with    Left Thigh - RECHECK     Femur fracture - IMN. DOS 1/21/24, 15 wk s/p. Patient notes her femur is doing good. It is stiff but doesn't hurt anymore. She can walk normal now. She doesn't have a limp anymore. She would like to discuss lifting her restrictions for work.          SURGICAL PROCEDURE: left femur fracture IMN ([place])  DATE OF PROCEDURE: 1/22/2024      HISTORY OF PRESENT ILLNESS    Divina Delgadillo is a 39 year old female seen for postoperative follow-up of a left femur fracture intramedullary nailing that occurred 15 weeks ago. Returns today doing well. A little stiff but no pain. Doesn't really have a limp anymore. She'd like to discuss return to work without restrictions.    Doing home exercise program, stopped going to therapy. No concerns today.      Recall injury when she was in [place], [place] sidewalk [place] airport and her boot caught and went flying into the air and landed on her left side.  She was taken to a hospital in [place], noted to have a left femur fracture, and had an intramedullary nail placed.      She is diabetic, A1c=9.8 on 3/29/2024      Other PMH:  has a past medical history of Acquired asplenia, Acute pain of left knee (03/22/2019), Acute-on-chronic kidney injury  (H24) (03/22/2019), ARF (acute renal failure) (H24) (03/23/2019), Asthma, Bipolar disorder (H), Cardiac murmur, Cervical high risk HPV (human papillomavirus) test positive (06/20/2017), Chiari malformation type I (H), Chronic bilateral low back pain without sciatica, Deep venous thrombosis of arm (H) (01/06/2017), Depressive disorder, DVT (deep venous thrombosis) (H), DVT of upper extremity (deep vein thrombosis) (H) (2021), Esophageal reflux, Hypertension, Insomnia, Leukocytosis, Mild intermittent asthma, Morbid obesity (H), Mucinous neoplasm of pancreas, NAFL (nonalcoholic fatty liver), Neck pain, Nicotine abuse, Other specified types of  schizophrenia, unspecified condition, Schizoaffective disorder, depressive type (H), Stage 3a chronic kidney disease (CKD) (H), Type 2 diabetes mellitus (H), Ulnar neuropathy of both upper extremities, and Vitamin D insufficiency.    She has no past medical history of Arthritis, Cerebral infarction (H), Congestive heart failure (H), COPD (chronic obstructive pulmonary disease) (H), Heart disease, History of blood transfusion, or Thyroid disease.  Patient Active Problem List   Diagnosis    Esophageal reflux    NAFL (nonalcoholic fatty liver)    Essential hypertension    Hyperlipidemia LDL goal <100    Stage 3 chronic kidney disease    Mucinous neoplasm of pancreas    Type 2 diabetes mellitus with stage 3 chronic kidney disease, with long-term current use of insulin (H)    Bipolar disorder (H)    Cervical high risk HPV (human papillomavirus) test positive    IUD (intrauterine device) in place    Morbid obesity (H)    Mild intermittent asthma with acute exacerbation    Nicotine abuse    Chronic leukocytosis    Acquired asplenia    Borderline personality disorder (H)    PTSD (post-traumatic stress disorder)    Long term (current) use of anticoagulants    Orthostatic hypotension    Tobacco abuse    Vitamin D insufficiency    Depressive disorder    Schizoaffective disorder, depressive type (H)    Cardiac murmur    History of DVT of arm  (deep vein thrombosis)    Insomnia, unspecified type    Ulnar neuropathy of both upper extremities    Chiari malformation type I (H)    Deep venous thrombosis of arm (H)    Neutrophilic leukocytosis    Other fracture of left femur, initial encounter for closed fracture (H)       Surgical Hx:  has a past surgical history that includes ENT surgery (10/01/2000); tonsillectomy (10/01/2000); splenectomy (2015); Adrenalectomy (Left, 2015); Pancreatectomy partial (2015); Percutaneous biopsy liver (Right, 11/14/2019); IR Liver Biopsy Percutaneous (11/14/2019); biopsy; Breast surgery (2013); and  colonoscopy.    Medications:   Current Outpatient Medications:     ACCU-CHEK GUIDE test strip, Use to test blood sugar 3 times daily or as directed., Disp: 150 strip, Rfl: 1    acetaminophen (TYLENOL) 325 MG tablet, Take 650 mg by mouth every 4 hours as needed for pain or fever 2 tab every 4-6 hours as needed, do not exceed 9 tabs in 24hours, Disp: , Rfl:     albuterol (VENTOLIN HFA) 108 (90 Base) MCG/ACT inhaler, INHALE 2 PUFFS INTO THE LUNGS EVERY SIX  HOURS AS NEEDED FOR SHORTNESS OF BREATH, Disp: 18 g, Rfl: 10    alendronate (FOSAMAX) 70 MG tablet, Take 1 tablet (70 mg) by mouth every 7 days, Disp: 12 tablet, Rfl: 0    ARIPiprazole (ABILIFY) 30 MG tablet, Take 30 mg by mouth daily, Disp: , Rfl:     atorvastatin (LIPITOR) 20 MG tablet, Take 1 tablet (20 mg) by mouth daily, Disp: 90 tablet, Rfl: 3    Biotin (BIOTIN FORTE) 5 MG TABS, Take 1 tablet by mouth daily, Disp: 60 tablet, Rfl: 1    blood glucose (ACCU-CHEK GUIDE) test strip, Use to test blood sugar 3 times daily or as directed., Disp: 100 strip, Rfl: 11    bumetanide (BUMEX) 0.5 MG tablet, Take 1 tablet (0.5 mg) by mouth daily, Disp: 30 tablet, Rfl: 2    carvedilol (COREG) 12.5 MG tablet, Take 1 tablet (12.5 mg) by mouth 2 times daily, Disp: 60 tablet, Rfl: 11    cholecalciferol 50 MCG (2000 UT) CAPS, Take 2,000 Units by mouth, Disp: , Rfl:     cloZAPine (CLOZARIL) 100 MG tablet, Take 100 mg by mouth One in the morning, 2 at night, Disp: , Rfl:     FLUoxetine (PROZAC) 40 MG capsule, Take 40 mg by mouth daily, Disp: , Rfl:     fluticasone (FLONASE) 50 MCG/ACT nasal spray, Spray 1 spray into both nostrils daily, Disp: 16 g, Rfl: 0    glucose (BD GLUCOSE) 4 g chewable tablet, Take 1 tablet by mouth every hour as needed for low blood sugar, Disp: 30 tablet, Rfl: 4    Insulin Aspart FlexPen 100 UNIT/ML SOPN, Inject 1-14 Units Subcutaneous 3 times daily (before meals) Inject 3 times daily with meals as instructed.  Max TDD 30., Disp: 30 mL, Rfl: 4    Insulin  "Infusion Pump (MINIMED 780G INSULIN PUMP) KIT, 1 each daily SmartGuard Target: 100; Active Insulin Time: 2 hours (recommended); SmartGuardTM Warmup/Manual Mode: Suspend before low (recommended), Disp: 1 kit, Rfl: 0    Insulin Infusion Pump Supplies (EXTENDED INFUSION SET 23\"/6MM) MISC, 1 each every 7 days Max Total Daily Dose 70 units; Change infusion set & reservoir every 7 days (recommended), Disp: 10 each, Rfl: 6    Insulin Infusion Pump Supplies (EXTENDED RESERVOIR 3ML) MISC, 1 each every 7 days, Disp: 10 each, Rfl: 11    insulin lispro (HUMALOG VIAL) 100 UNIT/ML vial, Inject 0-10 Units Subcutaneous, Disp: , Rfl:     lamoTRIgine (LAMICTAL) 100 MG tablet, Take 100 mg by mouth At Bedtime, Disp: , Rfl:     lisinopril (ZESTRIL) 5 MG tablet, Take 1 tablet (5 mg) by mouth daily, Disp: 30 tablet, Rfl: 11    loratadine (CLARITIN) 10 MG tablet, Take 1 tablet (10 mg) by mouth every morning, Disp: 30 tablet, Rfl: 9    meclizine (ANTIVERT) 25 MG tablet, Take 1 tablet (25 mg) by mouth 3 times daily as needed for dizziness, Disp: 30 tablet, Rfl: 0    omeprazole (PRILOSEC) 20 MG DR capsule, TAKE 1 CAPSULE BY MOUTH EVERY DAY, Disp: 30 capsule, Rfl: 2    pantoprazole (PROTONIX) 40 MG EC tablet, Take 40 mg by mouth, Disp: , Rfl:     Suvorexant (BELSOMRA) 10 MG tablet, Take 10 mg by mouth at bedtime, Disp: , Rfl:     topiramate (TOPAMAX) 25 MG tablet, Take 1 tablet (25 mg) by mouth 2 times daily, Disp: 60 tablet, Rfl: 3    ULTICARE MINI 31G X 6 MM insulin pen needle, USE 1 PEN NEEDLE 4 times daily, Disp: , Rfl:     Allergies:   Allergies   Allergen Reactions    Acetaminophen Other (See Comments)     pt previously tried to overdose on it, PMD does not want pt taking per pt.        Social Hx:  reports that she quit smoking about 2 months ago. Her smoking use included cigarettes. She started smoking about 5 months ago. She has a 0.1 pack-year smoking history. She has been exposed to tobacco smoke. She has never used smokeless " "tobacco. She reports that she does not drink alcohol and does not use drugs.    Family Hx: family history includes Allergies in her brother, mother, sister, and sister; Anxiety Disorder in her father and mother; Asthma in her mother; Chronic Obstructive Pulmonary Disease in her mother; Depression in her father, mother, sister, and sister; Diabetes in her father; Heart Disease in her father; Hypertension in her father; Mental Illness in her father and mother; Obesity in her father; Respiratory in her brother, mother, sister, and sister; Sleep Apnea in her maternal grandfather.    REVIEW OF SYSTEMS:  CONSTITUTIONAL:NEGATIVE for fever, chills, change in weight  INTEGUMENTARY/SKIN: NEGATIVE for worrisome rashes, moles or lesions  MUSCULOSKELETAL:See HPI above  NEURO: NEGATIVE for weakness, dizziness or paresthesias      PHYSICAL EXAM:  /75   Pulse 71   Ht 1.626 m (5' 4\")   Wt 101.2 kg (223 lb)   BMI 38.28 kg/m     GENERAL APPEARANCE: healthy, alert, no distress    SKIN: no suspicious lesions or rashes  NEURO: Normal strength and tone, mentation intact and speech normal  PSYCH:  mentation appears normal and affect normal  RESPIRATORY: No increased work of breathing.          left LOWER EXTREMITY EXAM:  Gait: tberg, slight favors the left.  Intact sensation deep peroneal nerve, superficial peroneal nerve, med/lat tibial nerve, sural nerve, saphenous nerve  Intact EHL, EDL, TA, FHL, GS, quadriceps hamstrings and hip flexors  Mild left quadriceps weakness.  calf soft and nttp or squeeze.  Edema: 1+    Wound / Incision: multiple surgical wounds healed well. Dry. No drainage. no erythema.  Inspection; slight swelling of the entire left lower extremity compared to the right.  Palpation: minimal over the quads, iliotibial band.  Non-tender: knee  Strength: grossly intact, weak.   No discomfort with hip range of motion.      X-RAY:  2 views left femur from 5/6/2024  were reviewed in clinic today. Stable alignment of " left proximal femoral shaft fracture with increased periosteal callus formation and decreased fracture lucency about the fracture site. There is soft tissue swelling. Hardware appears in place.           Impression: 39 year old female 15 weeks  postoperative intramedullary nailing of  left femoral shaft fracture , doing well.    Plan:   Images reviewed. Fracture essentially healed.  Weight bearing status: weight bearing as tolerated    Pain control: over the counter as needed.  Immobilzation: none.  Continue with  home exercise program.  Elevation of affected extremity  Return to clinic as needed.  Workability: ok to return to work without restrictions.  Advised better diabetic control.      Jimmie Nino M.D., M.S.  Dept. of Orthopaedic Surgery  [facility]

## 2024-05-06 NOTE — LETTER
May 6, 2024      Divina Delgadillo  [address] [facility] [address]        To whom it may concern:     Divina Delgadillo is under my care for   1. Closed fracture of shaft of left femur with routine healing, subsequent encounter         Date of surgery: 1/21/24    Return to work date: 5/13/24     ** WITH RESTRICTIONS? No restrictions. Please contact my office with any questions.       Next appointment: As needed        Electronically Signed (as below)  Dr. Jimmie Nino M.D.

## 2024-05-07 ENCOUNTER — VIRTUAL VISIT (OUTPATIENT)
Dept: PHARMACY | Facility: CLINIC | Age: 39
End: 2024-05-07
Payer: COMMERCIAL

## 2024-05-07 ENCOUNTER — NURSE TRIAGE (OUTPATIENT)
Dept: FAMILY MEDICINE | Facility: CLINIC | Age: 39
End: 2024-05-07
Payer: COMMERCIAL

## 2024-05-07 ENCOUNTER — TELEPHONE (OUTPATIENT)
Dept: FAMILY MEDICINE | Facility: CLINIC | Age: 39
End: 2024-05-07
Payer: COMMERCIAL

## 2024-05-07 DIAGNOSIS — G43.719 INTRACTABLE CHRONIC MIGRAINE WITHOUT AURA AND WITHOUT STATUS MIGRAINOSUS: ICD-10-CM

## 2024-05-07 DIAGNOSIS — G47.00 INSOMNIA, UNSPECIFIED TYPE: ICD-10-CM

## 2024-05-07 DIAGNOSIS — Z79.4 TYPE 2 DIABETES MELLITUS WITH DIABETIC POLYNEUROPATHY, WITH LONG-TERM CURRENT USE OF INSULIN (H): ICD-10-CM

## 2024-05-07 DIAGNOSIS — F43.10 PTSD (POST-TRAUMATIC STRESS DISORDER): ICD-10-CM

## 2024-05-07 DIAGNOSIS — J30.2 SEASONAL ALLERGIC RHINITIS, UNSPECIFIED TRIGGER: ICD-10-CM

## 2024-05-07 DIAGNOSIS — E11.42 TYPE 2 DIABETES MELLITUS WITH DIABETIC POLYNEUROPATHY, WITH LONG-TERM CURRENT USE OF INSULIN (H): ICD-10-CM

## 2024-05-07 DIAGNOSIS — F31.9 BIPOLAR AFFECTIVE DISORDER, REMISSION STATUS UNSPECIFIED (H): ICD-10-CM

## 2024-05-07 DIAGNOSIS — F60.3 BORDERLINE PERSONALITY DISORDER (H): ICD-10-CM

## 2024-05-07 DIAGNOSIS — R42 DIZZINESS: Primary | ICD-10-CM

## 2024-05-07 DIAGNOSIS — J45.21 MILD INTERMITTENT ASTHMA WITH ACUTE EXACERBATION: ICD-10-CM

## 2024-05-07 DIAGNOSIS — Z78.9 TAKES DIETARY SUPPLEMENTS: ICD-10-CM

## 2024-05-07 DIAGNOSIS — F25.1 SCHIZOAFFECTIVE DISORDER, DEPRESSIVE TYPE (H): ICD-10-CM

## 2024-05-07 DIAGNOSIS — K21.9 GASTROESOPHAGEAL REFLUX DISEASE WITHOUT ESOPHAGITIS: ICD-10-CM

## 2024-05-07 DIAGNOSIS — E78.5 HYPERLIPIDEMIA LDL GOAL <100: ICD-10-CM

## 2024-05-07 DIAGNOSIS — I10 ESSENTIAL HYPERTENSION: ICD-10-CM

## 2024-05-07 DIAGNOSIS — F32.A DEPRESSIVE DISORDER: ICD-10-CM

## 2024-05-07 PROCEDURE — 99605 MTMS BY PHARM NP 15 MIN: CPT | Mod: 95 | Performed by: PHARMACIST

## 2024-05-07 PROCEDURE — 99607 MTMS BY PHARM ADDL 15 MIN: CPT | Mod: 95 | Performed by: PHARMACIST

## 2024-05-07 RX ORDER — CLOZAPINE 50 MG/1
150 TABLET ORAL AT BEDTIME
COMMUNITY

## 2024-05-07 RX ORDER — INSULIN ASPART 100 [IU]/ML
INJECTION, SOLUTION INTRAVENOUS; SUBCUTANEOUS
COMMUNITY
End: 2024-06-24

## 2024-05-07 RX ORDER — HYDROXYZINE HYDROCHLORIDE 25 MG/1
25 TABLET, FILM COATED ORAL 3 TIMES DAILY PRN
COMMUNITY

## 2024-05-07 NOTE — TELEPHONE ENCOUNTER
Patient was called and triaged.     Closing this encounter.     Laureen GONZALEZ RN  [facility]  [phone]

## 2024-05-07 NOTE — LETTER
May 7, 2024  Divina Delgadillo  [address] [facility] [address]    Dear Ms. Delgadillo, [facility]     Thank you for talking with me on May 7, 2024 about your health and medications. As a follow-up to our conversation, I have included two documents:      Your Recommended To-Do List has steps you should take to get the best results from your medications.  Your Medication List will help you keep track of your medications and how to take them.    If you want to talk about these documents, please call Leana Hollis RPH at phone: [phone], Monday-Friday 8-4:30pm.    I look forward to working with you and your doctors to make sure your medications work well for you.    Sincerely,  Leana Hollis RPH  [facility] Pharmacist, [facility]

## 2024-05-07 NOTE — PROGRESS NOTES
Medication Therapy Management (MTM) Encounter    ASSESSMENT:                            Medication Adherence/Access: No issues identified    Dizziness: if dizziness is medication related - most likely either topiramate and/or carvedilol. May be additive effect. Migraines are stable - hasn't had a problem, try stopping morning topiramate, continue taking evening dose. Monitor dizziness and migraines. Patient has follow up with PCP on Thursday.     GERD: stable    Hypertension: stable - carvedilol could possiby be contributing to dizziness. Will start with topiramate - (see Dizziness).     Hyperlipidemia:  stable    Type 2 Diabetes:  continue to work with endocrinology and diabetes education - A1c not at goal, recently started using a pump.     Bipolar Disorder/Borderline Personality Disorder/PTSD/Depression/Schizoaffective Disorder/Insomnia: stable - continue to follow up with psychiatry.     Supplements: stable  Biotin 5 mg daily    Allergies: stable    Migraines: stable - will try stopping morning topiramate dose to see if dizziness improves.     Asthma: stable    PLAN:                            1. Stop morning topiramate dose - continue taking evening dose. Monitor dizziness.     Follow-up: Tuesday, May 28th at 3:00 PM    SUBJECTIVE/OBJECTIVE:                          Divina Delgadillo is a 39 year old female contacted via secure video for a follow-up visit from 12/12/2023.       Reason for visit: Comprehensive Medication Review (CMR).    Allergies/ADRs: Reviewed in chart  Past Medical History: Reviewed in chart  Tobacco: She reports that she quit smoking about 2 months ago. Her smoking use included cigarettes. She started smoking about 5 months ago. She has a 0.1 pack-year smoking history. She has been exposed to tobacco smoke. She has never used smokeless tobacco.  Alcohol: not currently using    Medication Adherence/Access: no issues reported    Dizziness: Patient states her dizziness is happening more  frequently and coming on out of the blue, no warning. States she feels 90% of the time happens when she stands up. Abilify changed to the evening - no change in dizziness/lightheadedness. Patient is on several medications that have risk for dizziness. Only two of her medications were started/dose increased close to the time she started noticing symptoms of dizziness - those are topiramate and carvedilol.     Prescribed meclizine by PCP - states helping some.     GERD:  Omeprazole 20 mg daily    History of an ulcer. No reflux symptoms - omeprazole is working well.     Hypertension:   Bumetanide 0.5 mg one daily   Carvedilol 12.5 mg twice daily     Carvedilol dose increased to 12.5 mg twice daily 12/19/2022. Carvedilol has risk for dizziness of 6-33%.    BP Readings from Last 3 Encounters:   05/06/24 139/75   04/24/24 128/70   04/15/24 136/68     Hyperlipidemia:   Atorvastatin 20 mg in the evening.     Patient reports no current medication side effects. Atorvastatin dose increased after latest labs.     Recent Labs   Lab Test 04/24/24  1130 12/21/22  0909   CHOL 192 175   HDL 51 72   * 81   TRIG 120 108     Type 2 Diabetes:    Novolog as needed for pump    Patient reports no current medication side effects.    Blood sugar monitoring: Continuous Glucose Monitor   Followed by diabetes education and endocrinology.   Current diabetes symptoms: none  Eye exam: up to date  Foot exam: up to date    Hemoglobin A1C   Date Value Ref Range Status   03/29/2024 9.8 (H) 0.0 - 5.6 % Final     Comment:     Normal <5.7%   Prediabetes 5.7-6.4%    Diabetes 6.5% or higher     Note: Adopted from ADA consensus guidelines.   07/08/2021 7.4 (H) 0 - 5.6 % Final     Comment:     Normal <5.7% Prediabetes 5.7-6.4%  Diabetes 6.5% or higher - adopted from ADA   consensus guidelines.         Bipolar Disorder/Borderline Personality Disorder/PTSD/Depression/Schizoaffective Disorder/Insomnia:  Abilify 30 mg daily   Clozapine 50 mg twice  daily  Fluoxetine 40 mg daily  Hydroxyzine 25 mg three times daily as needed for anxiety  Lamotrigine 100 mg at bedtime  Belsomra 10 mg at bedtime    Patient is followed by psychiatry - Dr. Onur Ross.     Patient works with psychiatry. Sees every 3-4 months. Patient states she has been on clozapine since she was 17 - risk for dizziness 14-27%. Abilify has risk for dizziness 4-10% - patient has been on since at least 2006, fluoxetine has risk for dizziness of 2-11% - patient has been on since at least 2006. Lamotrigine has risk for dizziness of 7-54% - patient has been on since 2018 (Current dose since 2019). Belsomra started 12/2023 - dose increased 2/20/2024, has risk for dizziness of 3%.    Supplements:  Biotin 5 mg daily    Allergies:  Loratadine 10 mg daily  Flonase nasal spray daily    Allegies are good.     Migraines:   Topiramate 25 mg twice daily    Started 12/21/2022. Risk for dizziness 4-25%. States hasn't had a migraine since starting topiramate.     Asthma:   Albuterol inhaler as needed    Using rarely.    Patient reports no current medication side effects.      Patient reports the following symptoms: none.      1/2/2023     1:00 PM 7/13/2023     1:43 PM 3/29/2024    12:08 PM   ACT Total Scores   ACT TOTAL SCORE (Goal Greater than or Equal to 20) 25 25 25   In the past 12 months, how many times did you visit the emergency room for your asthma without being admitted to the hospital? 0 0 0   In the past 12 months, how many times were you hospitalized overnight because of your asthma? 0 0 0     ----------------      I spent 30 minutes with this patient today. All changes were made via collaborative practice agreement with VERONIQUE Spears CNP. A copy of the visit note was provided to the patient's provider(s).    A summary of these recommendations was sent via [employer].    Leana Hollis, PharmD  Medication Therapy Management     Telemedicine Visit Details  Type of service:  Video Conference via  [employer]  Start Time:  3:00 PM  End Time: 3:30 PM     Medication Therapy Recommendations  Intractable chronic migraine without aura and without status migrainosus    Current Medication: topiramate (TOPAMAX) 25 MG tablet   Rationale: Undesirable effect - Adverse medication event - Safety   Recommendation: Decrease Dose   Status: Accepted per CPA

## 2024-05-07 NOTE — PATIENT INSTRUCTIONS
"Recommendations from today's MTM visit:                                                    MTM (medication therapy management) is a service provided by a clinical pharmacist designed to help you get the most of out of your medicines.   Today we reviewed what your medicines are for, how to know if they are working, that your medicines are safe and how to make your medicine regimen as easy as possible.      Divina,    It was a pleasure talking with you! We discussed the followin. Stop morning topiramate dose - continue taking evening dose. Monitor dizziness.     Follow-up: Tuesday, May 28th at 3:00 PM    It was great speaking with you today.  I value your experience and would be very thankful for your time in providing feedback in our clinic survey. In the next few days, you may receive an email or text message from [employer] with a link to a survey related to your  clinical pharmacist.\"     To schedule another MTM appointment, please call the clinic directly or you may call the MTM scheduling line at [phone] or toll-free at [phone].     My Clinical Pharmacist's contact information:                                                      Please feel free to contact me with any questions or concerns you have.      Keep up the good work!!    Leana Hollis, PharmD  [phone] in clinic on Tuesday and Wednesday        "

## 2024-05-07 NOTE — TELEPHONE ENCOUNTER
Vandana, home care RN, is calling to verify medication list. Vandana only had about half of what this writer is seeing on active medication list. Patient reports taking some of these medications differently then what is listed. The med list is so long and in depth that this writer did not write all this down. Patient has appointment with PCP on Thursday and was advised to bring a list of what she is currently taking and how she is taking them. Will make appointment note to reconcile medication list at appointment.     Laureen GONZALEZ RN  [facility]  [phone]

## 2024-05-07 NOTE — TELEPHONE ENCOUNTER
Reason for Disposition   Dizziness not present now, but is a chronic symptom (recurrent or ongoing AND lasting > 4 weeks)    Additional Information   Negative: SEVERE difficulty breathing (e.g., struggling for each breath, speaks in single words)   Negative: Shock suspected (e.g., cold/pale/clammy skin, too weak to stand, low BP, rapid pulse)   Negative: Difficult to awaken or acting confused (e.g., disoriented, slurred speech)   Negative: Fainted, and still feels dizzy afterwards   Negative: Overdose (accidental or intentional) of medications   Negative: New neurologic deficit that is present now: * Weakness of the face, arm, or leg on one side of the body * Numbness of the face, arm, or leg on one side of the body * Loss of speech or garbled speech   Negative: Heart beating < 50 beats per minute OR > 140 beats per minute   Negative: Sounds like a life-threatening emergency to the triager   Negative: Chest pain   Negative: Rectal bleeding, bloody stool, or tarry-black stool   Negative: Vomiting is main symptom   Negative: Diarrhea is main symptom   Negative: Headache is main symptom   Negative: Heat exhaustion suspected (i.e., dehydration from heat exposure)   Negative: Patient states that they are having an anxiety or panic attack   Negative: Dizziness from low blood sugar (i.e., < 60 mg/dl or 3.5 mmol/l)   Negative: SEVERE dizziness (e.g., unable to stand, requires support to walk, feels like passing out now)   Negative: SEVERE headache or neck pain   Negative: Spinning or tilting sensation (vertigo) present now and one or more stroke risk factors (i.e., hypertension, diabetes mellitus, prior stroke/TIA, heart attack, age over 60) (Exception: Prior physician evaluation for this AND no different/worse than usual.)   Negative: Neurologic deficit that was brief (now gone), ANY of the following:* Weakness of the face, arm, or leg on one side of the body* Numbness of the face, arm, or leg on one side of the body* Loss  of speech or garbled speech   Negative: Loss of vision or double vision  (Exception: Similar to previous migraines.)   Negative: Extra heartbeats, irregular heart beating, or heart is beating very fast (i.e., 'palpitations')   Negative: Difficulty breathing   Negative: Drinking very little and dehydration suspected (e.g., no urine > 12 hours, very dry mouth, very lightheaded)   Negative: Follows bleeding (e.g., stomach, rectum, vagina)  (Exception: Became dizzy from sight of small amount blood.)   Negative: Patient sounds very sick or weak to the triager   Negative: Lightheadedness (dizziness) present now, after 2 hours of rest and fluids   Negative: Spinning or tilting sensation (vertigo) present now   Negative: Fever > 103 F (39.4 C)   Negative: Fever > 100.0 F (37.8 C) and has diabetes mellitus or a weak immune system (e.g., HIV positive, cancer chemotherapy, organ transplant, splenectomy, chronic steroids)   Negative: MILD dizziness (e.g., walking normally) and has NOT been evaluated by physician for this (Exception: Dizziness caused by heat exposure, sudden standing, or poor fluid intake.)   Negative: Substance use (drug use) or unhealthy alcohol use, known or suspected   Negative: MODERATE dizziness (e.g., interferes with normal activities)  (Exception: Dizziness caused by heat exposure, sudden standing, or poor fluid intake.)   Negative: Vomiting occurs with dizziness   Negative: Patient wants to be seen   Negative: Taking a medicine that could cause dizziness (e.g., blood pressure medications, diuretics)    Answer Assessment - Initial Assessment Questions  1. DESCRIPTION: Stand up and get lightheaded. Everything is spinning and feels like she is going to fall.   2. LIGHTHEADED: Faint, woozy, and don't know where her feet are.   3. VERTIGO: Feels like the room is tilting.   4. SEVERITY:  MILD: Feels slightly dizzy, but walking normally. Last a few minutes vs it was only lasting a few seconds.   5. ONSET:  Couple days ago.  6. AGGRAVATING FACTORS: Standing.   7. HEART RATE: Manual - Sitting /72 HR 76; Standing /86 HR 87  9. RECURRENT SYMPTOM: Yes, diagnosed with Vertigo.  10. OTHER SYMPTOMS: Denies fever, chest pain, vomiting, diarrhea, or bleeding.   11. PREGNANCY: Denies    Protocols used: Dizziness-A-OH    Laureen GONZALEZ RN  [facility]  [phone]

## 2024-05-07 NOTE — LETTER
_  Medication List        Prepared on: 05/07/2024     Bring your Medication List when you go to the doctor, hospital, or   emergency room. And, share it with your family or caregivers.     Note any changes to how you take your medications.  Cross out medications when you no longer use them.    Medication How I take it Why I use it Prescriber   acetaminophen (TYLENOL) 325 MG tablet Take 650 mg by mouth every 4 hours as needed for pain or fever 2 tab every 4-6 hours as needed, do not exceed 9 tabs in 24hours Pain Patient Reported   albuterol (VENTOLIN HFA) 108 (90 Base) MCG/ACT inhaler INHALE 2 PUFFS INTO THE LUNGS EVERY SIX  HOURS AS NEEDED FOR SHORTNESS OF BREATH Mild intermittent asthma with acute exacerbation VERONIQUE Bledsoe CNP   alendronate (FOSAMAX) 70 MG tablet Take 1 tablet (70 mg) by mouth every 7 days Osteoporosis with current pathological fracture with routine healing, unspecified osteoporosis type, subsequent encounter VERONIQUE Bledsoe CNP   ARIPiprazole (ABILIFY) 30 MG tablet Take 30 mg by mouth daily Schizoaffective Disorder Dr. Onur Ross   atorvastatin (LIPITOR) 20 MG tablet Take 1 tablet (20 mg) by mouth daily Hyperlipidemia LDL Goal <100 VERONIQUE Bledsoe CNP   Biotin (BIOTIN FORTE) 5 MG TABS Take 1 tablet by mouth daily Hair Loss VERONIQUE Bledsoe CNP   bumetanide (BUMEX) 0.5 MG tablet Take 1 tablet (0.5 mg) by mouth daily Lower Extremity Edema Sánchez Dias DO   carvedilol (COREG) 12.5 MG tablet Take 1 tablet (12.5 mg) by mouth 2 times daily Essential Hypertension Sánchez Dias DO   cloZAPine (CLOZARIL) 50 MG tablet Take 150 mg by mouth at bedtime Schizoaffective Disorder Dr. Onur Ross   FLUoxetine (PROZAC) 40 MG capsule Take 40 mg by mouth daily Bipolar Disorder Dr. Onur Ross   fluticasone (FLONASE) 50 MCG/ACT nasal spray Spray 1 spray into both nostrils daily Acute effusion of right ear VERONIQUE Bledsoe CNP    hydrOXYzine HCl (ATARAX) 25 MG tablet Take 25 mg by mouth 3 times daily as needed for anxiety Anxiety Dr. Onur Ross   insulin aspart (NOVOLOG VIAL) 100 UNITS/ML vial As needed for pump Type 2 Diabetes Dr. Christophe Beckman   lamoTRIgine (LAMICTAL) 100 MG tablet Take 100 mg by mouth At Bedtime Bipolar Disorder Dr. Onur Ross   loratadine (CLARITIN) 10 MG tablet Take 1 tablet (10 mg) by mouth every morning Chronic seasonal allergic rhinitis VERONIQUE Bledsoe CNP   meclizine (ANTIVERT) 25 MG tablet Take 1 tablet (25 mg) by mouth 3 times daily as needed for dizziness Dizziness VERONIQUE Bledsoe CNP   omeprazole (PRILOSEC) 20 MG DR capsule TAKE 1 CAPSULE BY MOUTH EVERY DAY Gastroesophageal Reflux Disease without Esophagitis VERONIQUE Bledsoe CNP   Suvorexant (BELSOMRA) 10 MG tablet Take 10 mg by mouth at bedtime Insomnia Dr. Onur Ross   topiramate (TOPAMAX) 25 MG tablet Take 1 tablet (25 mg) by mouth in the evening Migraine without aura and without status migrainosus, not intractable VERONIQUE Bledsoe CNP         Add new medications, over-the-counter drugs, herbals, vitamins, or  minerals in the blank rows below.    Medication How I take it Why I use it Prescriber                                      Allergies:      - Acetaminophen - Other (See Comments)        Side effects I have had:      Not on File        Other Information:              My notes and questions:

## 2024-05-07 NOTE — LETTER
"Recommended To-Do List      Prepared on: 05/07/2024       You can get the best results from your medications by completing the items on this \"To-Do List.\"      Bring your To-Do List when you go to your doctor. And, share it with your family or caregivers.    My To-Do List:  What we talked about: What I should do:   An possible issue with your medication    Decrease your dosage of topiramate (TOPAMAX) - stop your morning dose, continue taking your evening dose - monitor dizziness.           What we talked about: What I should do:                     "

## 2024-05-08 ENCOUNTER — TELEPHONE (OUTPATIENT)
Dept: FAMILY MEDICINE | Facility: CLINIC | Age: 39
End: 2024-05-08
Payer: COMMERCIAL

## 2024-05-08 NOTE — TELEPHONE ENCOUNTER
Forms/Letter Request    Type of form/letter: OTHER: Order for Signature        Do we have the form/letter: Yes    Who is the form from? Home care    Where did/will the form come from? form was faxed in    How would you like the form/letter returned: Fax : [phone]    Order for Signature form from [facility] was faxed back on 5/8/24.     Farnaz Lanier  [facility], Primary Care

## 2024-05-09 ENCOUNTER — ORDERS ONLY (AUTO-RELEASED) (OUTPATIENT)
Dept: FAMILY MEDICINE | Facility: CLINIC | Age: 39
End: 2024-05-09

## 2024-05-09 ENCOUNTER — OFFICE VISIT (OUTPATIENT)
Dept: FAMILY MEDICINE | Facility: CLINIC | Age: 39
End: 2024-05-09
Payer: COMMERCIAL

## 2024-05-09 VITALS
WEIGHT: 226.1 LBS | OXYGEN SATURATION: 99 % | HEART RATE: 71 BPM | RESPIRATION RATE: 16 BRPM | TEMPERATURE: 97.6 F | BODY MASS INDEX: 38.81 KG/M2 | SYSTOLIC BLOOD PRESSURE: 110 MMHG | DIASTOLIC BLOOD PRESSURE: 60 MMHG

## 2024-05-09 DIAGNOSIS — G43.009 MIGRAINE WITHOUT AURA AND WITHOUT STATUS MIGRAINOSUS, NOT INTRACTABLE: ICD-10-CM

## 2024-05-09 DIAGNOSIS — I10 ESSENTIAL (PRIMARY) HYPERTENSION: ICD-10-CM

## 2024-05-09 DIAGNOSIS — E11.22 TYPE 2 DIABETES MELLITUS WITH STAGE 3A CHRONIC KIDNEY DISEASE, WITH LONG-TERM CURRENT USE OF INSULIN (H): Primary | ICD-10-CM

## 2024-05-09 DIAGNOSIS — Z79.4 TYPE 2 DIABETES MELLITUS WITH STAGE 3A CHRONIC KIDNEY DISEASE, WITH LONG-TERM CURRENT USE OF INSULIN (H): Primary | ICD-10-CM

## 2024-05-09 DIAGNOSIS — R42 DIZZINESS: ICD-10-CM

## 2024-05-09 DIAGNOSIS — N18.31 TYPE 2 DIABETES MELLITUS WITH STAGE 3A CHRONIC KIDNEY DISEASE, WITH LONG-TERM CURRENT USE OF INSULIN (H): Primary | ICD-10-CM

## 2024-05-09 LAB
ANION GAP SERPL CALCULATED.3IONS-SCNC: 11 MMOL/L (ref 7–15)
BASOPHILS # BLD AUTO: 0.1 10E3/UL (ref 0–0.2)
BASOPHILS NFR BLD AUTO: 0 %
BUN SERPL-MCNC: 26.3 MG/DL (ref 6–20)
CALCIUM SERPL-MCNC: 9 MG/DL (ref 8.6–10)
CHLORIDE SERPL-SCNC: 108 MMOL/L (ref 98–107)
CREAT SERPL-MCNC: 1.34 MG/DL (ref 0.51–0.95)
DEPRECATED HCO3 PLAS-SCNC: 23 MMOL/L (ref 22–29)
EGFRCR SERPLBLD CKD-EPI 2021: 52 ML/MIN/1.73M2
EOSINOPHIL # BLD AUTO: 0.4 10E3/UL (ref 0–0.7)
EOSINOPHIL NFR BLD AUTO: 3 %
ERYTHROCYTE [DISTWIDTH] IN BLOOD BY AUTOMATED COUNT: 16 % (ref 10–15)
GLUCOSE SERPL-MCNC: 115 MG/DL (ref 70–99)
HBA1C MFR BLD: 8.7 % (ref 0–5.6)
HCT VFR BLD AUTO: 38.1 % (ref 35–47)
HGB BLD-MCNC: 11.1 G/DL (ref 11.7–15.7)
IMM GRANULOCYTES # BLD: 0.1 10E3/UL
IMM GRANULOCYTES NFR BLD: 0 %
LYMPHOCYTES # BLD AUTO: 3.9 10E3/UL (ref 0.8–5.3)
LYMPHOCYTES NFR BLD AUTO: 25 %
MCH RBC QN AUTO: 26.7 PG (ref 26.5–33)
MCHC RBC AUTO-ENTMCNC: 29.1 G/DL (ref 31.5–36.5)
MCV RBC AUTO: 92 FL (ref 78–100)
MONOCYTES # BLD AUTO: 0.9 10E3/UL (ref 0–1.3)
MONOCYTES NFR BLD AUTO: 6 %
NEUTROPHILS # BLD AUTO: 10.4 10E3/UL (ref 1.6–8.3)
NEUTROPHILS NFR BLD AUTO: 66 %
PLATELET # BLD AUTO: 475 10E3/UL (ref 150–450)
POTASSIUM SERPL-SCNC: 4.8 MMOL/L (ref 3.4–5.3)
RBC # BLD AUTO: 4.16 10E6/UL (ref 3.8–5.2)
SODIUM SERPL-SCNC: 142 MMOL/L (ref 135–145)
WBC # BLD AUTO: 15.8 10E3/UL (ref 4–11)

## 2024-05-09 PROCEDURE — 80048 BASIC METABOLIC PNL TOTAL CA: CPT | Performed by: NURSE PRACTITIONER

## 2024-05-09 PROCEDURE — 83036 HEMOGLOBIN GLYCOSYLATED A1C: CPT | Performed by: NURSE PRACTITIONER

## 2024-05-09 PROCEDURE — G2211 COMPLEX E/M VISIT ADD ON: HCPCS | Performed by: NURSE PRACTITIONER

## 2024-05-09 PROCEDURE — 85025 COMPLETE CBC W/AUTO DIFF WBC: CPT | Performed by: NURSE PRACTITIONER

## 2024-05-09 PROCEDURE — 99214 OFFICE O/P EST MOD 30 MIN: CPT | Performed by: NURSE PRACTITIONER

## 2024-05-09 PROCEDURE — 36415 COLL VENOUS BLD VENIPUNCTURE: CPT | Performed by: NURSE PRACTITIONER

## 2024-05-09 RX ORDER — LISINOPRIL 2.5 MG/1
2.5 TABLET ORAL DAILY
Qty: 90 TABLET | Refills: 1 | Status: SHIPPED | OUTPATIENT
Start: 2024-05-09

## 2024-05-09 RX ORDER — TOPIRAMATE 25 MG/1
25 TABLET, FILM COATED ORAL AT BEDTIME
Qty: 90 TABLET | Refills: 3 | Status: SHIPPED | OUTPATIENT
Start: 2024-05-09

## 2024-05-09 ASSESSMENT — PAIN SCALES - GENERAL: PAINLEVEL: NO PAIN (0)

## 2024-05-09 NOTE — PROGRESS NOTES
Assessment & Plan     Type 2 diabetes mellitus with stage 3a chronic kidney disease, with long-term current use of insulin (H)  -improved with insulin pump  -advised patient to recheck A1c again in 3 months   - Adult Eye  Referral; Future  - Basic metabolic panel  (Ca, Cl, CO2, Creat, Gluc, K, Na, BUN); Future  - Basic metabolic panel  (Ca, Cl, CO2, Creat, Gluc, K, Na, BUN)  - Hemoglobin A1c; Future  - lisinopril (ZESTRIL) 2.5 MG tablet; Take 1 tablet (2.5 mg) by mouth daily    Dizziness  -unclear etiology, labs are stable, symptoms are on and off, chronic, patient recently had brain MRI done on 01/13 which did not show any acute findings. Possible vertigo vs side effects from medications. Patient also reported occasional palpitations, will obtain Holter monitor for 7 days to rule out possible arrhythmia as the another potential cause of her dizziness.    -will decrease Topamax to only 25 mg daily at bedtime and stop morning dose  -patient was feeling better when she was taking Meclizine as needed, but past 2 days she took it twice and did not feel any relief  -dizziness might be due to possible vertigo, patient described her symptoms as feeling lightheaded and unsteady on her feet. Since she responded well to Meclizine in the past I think if she continue to have symptoms next step will be evaluation by dizzy and balance center or referral for vestibular therapy   - Basic metabolic panel  (Ca, Cl, CO2, Creat, Gluc, K, Na, BUN); Future  - CBC with platelets and differential; Future  - ZIO PATCH MAIL OUT; Future  - CBC with platelets and differential  - Basic metabolic panel  (Ca, Cl, CO2, Creat, Gluc, K, Na, BUN)    Migraine without aura and without status migrainosus, not intractable    - topiramate (TOPAMAX) 25 MG tablet; Take 1 tablet (25 mg) by mouth at bedtime    Essential (primary) hypertension  -well controlled   - renal function improved, will restart Lisinopril at low dose, lisinopril (ZESTRIL)  2.5 MG tablet; Take 1 tablet (2.5 mg) by mouth daily    Rhiannon Murcia is a 39 year old, presenting for the following health issues:  Dizziness (Has not been taking lisinopril, needs to know if she should be taking this or not )        5/9/2024     8:10 AM   Additional Questions   Roomed by og   Accompanied by self         5/9/2024     8:10 AM   Patient Reported Additional Medications   Patient reports taking the following new medications none     History of Present Illness       Reason for visit:  Extreme dizziness    She eats 0-1 servings of fruits and vegetables daily.She consumes 1 sweetened beverage(s) daily.She exercises with enough effort to increase her heart rate 9 or less minutes per day.  She exercises with enough effort to increase her heart rate 3 or less days per week.   She is taking medications regularly.     Medication Followup of Meclizine   Taking Medication as prescribed: yes  Side Effects:  None  Medication Helping Symptoms:  NO      Review of Systems  Constitutional, HEENT, cardiovascular, pulmonary, gi and gu systems are negative, except as otherwise noted.      Objective    /60   Pulse 71   Temp 97.6  F (36.4  C) (Tympanic)   Resp 16   Wt 102.6 kg (226 lb 1.6 oz)   SpO2 99%   BMI 38.81 kg/m    Body mass index is 38.81 kg/m .  Physical Exam   GENERAL: alert and no distress  NECK: no adenopathy, no asymmetry, masses, or scars  RESP: lungs clear to auscultation - no rales, rhonchi or wheezes  CV: regular rate and rhythm, normal S1 S2, no S3 or S4, no murmur, click or rub, no peripheral edema  MS: no gross musculoskeletal defects noted, no edema    Results for orders placed or performed in visit on 05/09/24 (from the past 24 hour(s))   CBC with platelets and differential    Narrative    The following orders were created for panel order CBC with platelets and differential.  Procedure                               Abnormality         Status                     ---------                                -----------         ------                     CBC with platelets and d...[[id number]]  Abnormal            Final result                 Please view results for these tests on the individual orders.   Basic metabolic panel  (Ca, Cl, CO2, Creat, Gluc, K, Na, BUN)   Result Value Ref Range    Sodium 142 135 - 145 mmol/L    Potassium 4.8 3.4 - 5.3 mmol/L    Chloride 108 (H) 98 - 107 mmol/L    Carbon Dioxide (CO2) 23 22 - 29 mmol/L    Anion Gap 11 7 - 15 mmol/L    Urea Nitrogen 26.3 (H) 6.0 - 20.0 mg/dL    Creatinine 1.34 (H) 0.51 - 0.95 mg/dL    GFR Estimate 52 (L) >60 mL/min/1.73m2    Calcium 9.0 8.6 - 10.0 mg/dL    Glucose 115 (H) 70 - 99 mg/dL   Hemoglobin A1c   Result Value Ref Range    Hemoglobin A1C 8.7 (H) 0.0 - 5.6 %   CBC with platelets and differential   Result Value Ref Range    WBC Count 15.8 (H) 4.0 - 11.0 10e3/uL    RBC Count 4.16 3.80 - 5.20 10e6/uL    Hemoglobin 11.1 (L) 11.7 - 15.7 g/dL    Hematocrit 38.1 35.0 - 47.0 %    MCV 92 78 - 100 fL    MCH 26.7 26.5 - 33.0 pg    MCHC 29.1 (L) 31.5 - 36.5 g/dL    RDW 16.0 (H) 10.0 - 15.0 %    Platelet Count 475 (H) 150 - 450 10e3/uL    % Neutrophils 66 %    % Lymphocytes 25 %    % Monocytes 6 %    % Eosinophils 3 %    % Basophils 0 %    % Immature Granulocytes 0 %    Absolute Neutrophils 10.4 (H) 1.6 - 8.3 10e3/uL    Absolute Lymphocytes 3.9 0.8 - 5.3 10e3/uL    Absolute Monocytes 0.9 0.0 - 1.3 10e3/uL    Absolute Eosinophils 0.4 0.0 - 0.7 10e3/uL    Absolute Basophils 0.1 0.0 - 0.2 10e3/uL    Absolute Immature Granulocytes 0.1 <=0.4 10e3/uL     *Note: Due to a large number of results and/or encounters for the requested time period, some results have not been displayed. A complete set of results can be found in Results Review.           Signed Electronically by: VERONIQUE Spears CNP

## 2024-05-25 ENCOUNTER — MYC MEDICAL ADVICE (OUTPATIENT)
Dept: FAMILY MEDICINE | Facility: CLINIC | Age: 39
End: 2024-05-25
Payer: COMMERCIAL

## 2024-05-25 DIAGNOSIS — R60.0 LOWER EXTREMITY EDEMA: ICD-10-CM

## 2024-05-25 DIAGNOSIS — G43.009 MIGRAINE WITHOUT AURA AND WITHOUT STATUS MIGRAINOSUS, NOT INTRACTABLE: ICD-10-CM

## 2024-05-25 DIAGNOSIS — G43.719 INTRACTABLE CHRONIC MIGRAINE WITHOUT AURA AND WITHOUT STATUS MIGRAINOSUS: ICD-10-CM

## 2024-05-28 ENCOUNTER — ANCILLARY PROCEDURE (OUTPATIENT)
Dept: GENERAL RADIOLOGY | Facility: CLINIC | Age: 39
End: 2024-05-28
Attending: PHYSICIAN ASSISTANT
Payer: COMMERCIAL

## 2024-05-28 ENCOUNTER — TELEPHONE (OUTPATIENT)
Dept: ORTHOPEDICS | Facility: CLINIC | Age: 39
End: 2024-05-28

## 2024-05-28 ENCOUNTER — OFFICE VISIT (OUTPATIENT)
Dept: URGENT CARE | Facility: URGENT CARE | Age: 39
End: 2024-05-28
Payer: COMMERCIAL

## 2024-05-28 ENCOUNTER — NURSE TRIAGE (OUTPATIENT)
Dept: FAMILY MEDICINE | Facility: CLINIC | Age: 39
End: 2024-05-28

## 2024-05-28 VITALS
WEIGHT: 225 LBS | BODY MASS INDEX: 38.62 KG/M2 | TEMPERATURE: 98 F | DIASTOLIC BLOOD PRESSURE: 81 MMHG | HEART RATE: 70 BPM | SYSTOLIC BLOOD PRESSURE: 187 MMHG | RESPIRATION RATE: 16 BRPM | OXYGEN SATURATION: 98 %

## 2024-05-28 DIAGNOSIS — M25.572 ACUTE LEFT ANKLE PAIN: Primary | ICD-10-CM

## 2024-05-28 DIAGNOSIS — M25.572 ACUTE LEFT ANKLE PAIN: ICD-10-CM

## 2024-05-28 PROCEDURE — 73610 X-RAY EXAM OF ANKLE: CPT | Mod: TC | Performed by: RADIOLOGY

## 2024-05-28 PROCEDURE — 99214 OFFICE O/P EST MOD 30 MIN: CPT | Performed by: PHYSICIAN ASSISTANT

## 2024-05-28 RX ORDER — BUMETANIDE 0.5 MG/1
0.5 TABLET ORAL DAILY
Qty: 30 TABLET | Refills: 2 | Status: SHIPPED | OUTPATIENT
Start: 2024-05-28 | End: 2024-08-20

## 2024-05-28 RX ORDER — TOPIRAMATE 50 MG/1
50 TABLET, FILM COATED ORAL 2 TIMES DAILY
Qty: 60 TABLET | Refills: 3 | OUTPATIENT
Start: 2024-05-28

## 2024-05-28 NOTE — TELEPHONE ENCOUNTER
"Reason for Disposition   Redness and painful when touched and no fever    Additional Information   Negative: Chest pain   Negative: Followed an ankle injury   Negative: Followed a bee sting and has localized swelling (e.g., small area of puffy or swollen skin)   Negative: Followed an insect bite and has localized swelling (e.g., small area of puffy or swollen skin)   Negative: Ankle pain is main symptom   Negative: Swelling of both ankles (i.e., pedal edema)   Negative: Swelling of calf or leg is main symptom   Negative: Difficulty breathing   Negative: Entire foot is cool or blue in comparison to other side   Negative: Ankle pain and fever   Negative: Ankle redness and fever   Negative: Patient sounds very sick or weak to the triager   Negative: SEVERE pain (e.g., excruciating, unable to walk) and not improved after 2 hours of pain medicine    Answer Assessment - Initial Assessment Questions  1. LOCATION: \"Which ankle is swollen?\" \"Where is the swelling?\"      Left ankle   2. ONSET: \"When did the swelling start?\"      A week  3. SWELLING: \"How bad is the swelling?\" Or, \"How large is it?\" (e.g., mild, moderate, severe; size of localized swelling)     - NONE: No joint swelling.    - LOCALIZED: Localized; small area of puffy or swollen skin (e.g., insect bite, skin irritation).    - MILD: Joint looks or feels mildly swollen or puffy.    - MODERATE: Swollen; interferes with normal activities (e.g., work or school); decreased range of movement; may be limping.    - SEVERE: Very swollen; can't move swollen joint at all; limping a lot or unable to walk.      Moderate   4. PAIN: \"Is there any pain?\" If Yes, ask: \"How bad is it?\" (Scale 1-10; or mild, moderate, severe)    - NONE (0): no pain.    - MILD (1-3): doesn't interfere with normal activities.     - MODERATE (4-7): interferes with normal activities (e.g., work or school) or awakens from sleep, limping.     - SEVERE (8-10): excruciating pain, unable to do any normal " "activities, unable to walk.       Moderate   5. CAUSE: \"What do you think caused the ankle swelling?\"      Unsure   6. OTHER SYMPTOMS: \"Do you have any other symptoms?\" (e.g., fever, chest pain, difficulty breathing, calf pain)      No  7. PREGNANCY: \"Is there any chance you are pregnant?\" \"When was your last menstrual period?\"      No    Protocols used: Ankle Swelling-A-OH    "

## 2024-05-28 NOTE — TELEPHONE ENCOUNTER
Nephrology Note: Medication Refill Request    Medication Refill Request:     Medication/Dose/Frequency:   bumetanide (BUMEX) 0.5 MG tablet 30 tablet 2 5/28/2024 -- No   Sig - Route: Take 1 tablet (0.5 mg) by mouth daily - Oral     Preferred Pharmacy: [facility] - [facility] [address] [facility]   Provider:     Dr Dias                      SITUATION/BACKROUND:                 Last office visit: 12/11/23        Future office visit: none     ASSESSMENT:     Neph Assessments:    Recent Labs:  CBC Results:  Recent Labs   Lab Test 05/09/24  0948   WBC 15.8*   RBC 4.16   HGB 11.1*   HCT 38.1   MCV 92   MCH 26.7   MCHC 29.1*   RDW 16.0*   *     Last Renal Panel:  Sodium   Date Value Ref Range Status   05/09/2024 142 135 - 145 mmol/L Final     Comment:     Reference intervals for this test were updated on 09/26/2023 to more accurately reflect our healthy population. There may be differences in the flagging of prior results with similar values performed with this method. Interpretation of those prior results can be made in the context of the updated reference intervals.    07/08/2021 139 133 - 144 mmol/L Final     Potassium   Date Value Ref Range Status   05/09/2024 4.8 3.4 - 5.3 mmol/L Final   12/19/2022 5.1 3.4 - 5.3 mmol/L Final   07/08/2021 4.4 3.4 - 5.3 mmol/L Final     Chloride   Date Value Ref Range Status   05/09/2024 108 (H) 98 - 107 mmol/L Final   12/19/2022 112 (H) 94 - 109 mmol/L Final   07/08/2021 108 94 - 109 mmol/L Final     Carbon Dioxide   Date Value Ref Range Status   07/08/2021 26 20 - 32 mmol/L Final     Carbon Dioxide (CO2)   Date Value Ref Range Status   05/09/2024 23 22 - 29 mmol/L Final   12/19/2022 26 20 - 32 mmol/L Final     Anion Gap   Date Value Ref Range Status   05/09/2024 11 7 - 15 mmol/L Final   12/19/2022 3 3 - 14 mmol/L Final   07/08/2021 5 3 - 14 mmol/L Final     Glucose   Date Value Ref Range Status   05/09/2024 115 (H) 70 - 99 mg/dL Final   12/19/2022 258  (H) 70 - 99 mg/dL Final   07/08/2021 187 (H) 70 - 99 mg/dL Final     GLUCOSE BY METER POCT   Date Value Ref Range Status   09/18/2023 121 (H) 70 - 99 mg/dL Final     Urea Nitrogen   Date Value Ref Range Status   05/09/2024 26.3 (H) 6.0 - 20.0 mg/dL Final   12/19/2022 19 7 - 30 mg/dL Final   07/08/2021 22 7 - 30 mg/dL Final     Creatinine   Date Value Ref Range Status   05/09/2024 1.34 (H) 0.51 - 0.95 mg/dL Final   07/08/2021 1.32 (H) 0.52 - 1.04 mg/dL Final     GFR Estimate   Date Value Ref Range Status   05/09/2024 52 (L) >60 mL/min/1.73m2 Final   07/08/2021 52 (L) >60 mL/min/[1.73_m2] Final     Comment:     Non  GFR Calc  Starting 12/18/2018, serum creatinine based estimated GFR (eGFR) will be   calculated using the Chronic Kidney Disease Epidemiology Collaboration   (CKD-EPI) equation.       Calcium   Date Value Ref Range Status   05/09/2024 9.0 8.6 - 10.0 mg/dL Final   07/08/2021 9.2 8.5 - 10.1 mg/dL Final     Phosphorus   Date Value Ref Range Status   12/11/2023 2.4 (L) 2.5 - 4.5 mg/dL Final   06/15/2021 3.2 2.5 - 4.5 mg/dL Final     Albumin   Date Value Ref Range Status   12/11/2023 3.8 3.5 - 5.2 g/dL Final   12/19/2022 3.0 (L) 3.4 - 5.0 g/dL Final   07/08/2021 3.4 3.4 - 5.0 g/dL Final       Current Medication List:   Current Outpatient Medications (Antihypertensive, Cardiovascular, Diuretics, Beta blockers, Calcium blockers, Anticoagulants)   Medication Sig    bumetanide (BUMEX) 0.5 MG tablet Take 1 tablet (0.5 mg) by mouth daily    carvedilol (COREG) 12.5 MG tablet Take 1 tablet (12.5 mg) by mouth 2 times daily    lisinopril (ZESTRIL) 2.5 MG tablet Take 1 tablet (2.5 mg) by mouth daily     Current Outpatient Medications (Other)   Medication Sig    ACCU-CHEK GUIDE test strip Use to test blood sugar 3 times daily or as directed. (Patient not taking: Reported on 5/7/2024)    acetaminophen (TYLENOL) 325 MG tablet Take 650 mg by mouth every 4 hours as needed for pain or fever 2 tab every 4-6 hours  "as needed, do not exceed 9 tabs in 24hours    albuterol (VENTOLIN HFA) 108 (90 Base) MCG/ACT inhaler INHALE 2 PUFFS INTO THE LUNGS EVERY SIX  HOURS AS NEEDED FOR SHORTNESS OF BREATH    alendronate (FOSAMAX) 70 MG tablet Take 1 tablet (70 mg) by mouth every 7 days    ARIPiprazole (ABILIFY) 30 MG tablet Take 30 mg by mouth daily    atorvastatin (LIPITOR) 20 MG tablet Take 1 tablet (20 mg) by mouth daily    Biotin (BIOTIN FORTE) 5 MG TABS Take 1 tablet by mouth daily    blood glucose (ACCU-CHEK GUIDE) test strip Use to test blood sugar 3 times daily or as directed. (Patient not taking: Reported on 5/7/2024)    cloZAPine (CLOZARIL) 50 MG tablet Take 150 mg by mouth at bedtime    FLUoxetine (PROZAC) 40 MG capsule Take 40 mg by mouth daily    glucose (BD GLUCOSE) 4 g chewable tablet Take 1 tablet by mouth every hour as needed for low blood sugar (Patient not taking: Reported on 5/7/2024)    hydrOXYzine HCl (ATARAX) 25 MG tablet Take 25 mg by mouth 3 times daily as needed for anxiety (Patient not taking: Reported on 5/9/2024)    insulin aspart (NOVOLOG VIAL) 100 UNITS/ML vial As needed for pump    Insulin Infusion Pump (MINIMED 780G INSULIN PUMP) KIT 1 each daily SmartGuard Target: 100; Active Insulin Time: 2 hours (recommended); SmartGuardTM Warmup/Manual Mode: Suspend before low (recommended)    Insulin Infusion Pump Supplies (EXTENDED INFUSION SET 23\"/6MM) MISC 1 each every 7 days Max Total Daily Dose 70 units; Change infusion set & reservoir every 7 days (recommended)    Insulin Infusion Pump Supplies (EXTENDED RESERVOIR 3ML) MISC 1 each every 7 days    lamoTRIgine (LAMICTAL) 100 MG tablet Take 100 mg by mouth At Bedtime    loratadine (CLARITIN) 10 MG tablet Take 1 tablet (10 mg) by mouth every morning    meclizine (ANTIVERT) 25 MG tablet Take 1 tablet (25 mg) by mouth 3 times daily as needed for dizziness    omeprazole (PRILOSEC) 20 MG DR capsule TAKE 1 CAPSULE BY MOUTH EVERY DAY    Suvorexant (BELSOMRA) 10 MG tablet " Take 10 mg by mouth at bedtime    topiramate (TOPAMAX) 25 MG tablet Take 1 tablet (25 mg) by mouth at bedtime       Has patient had kidney transplant in the prior 1 year: no.   Has patient been seen the last 12 months: Yes.  Associated labs reviewed for medication: Yes    PLAN:     Medication refilled per protocol: Yes    SCOTT CHILDS RN

## 2024-05-28 NOTE — PROGRESS NOTES
"  Assessment & Plan     Acute left ankle pain  X-ray showing possible avascular necrosis. Fitted patient with walking boot and she has crutches to use if needed. Would recommend follow up with podiatry this week for further evaluation. Patient agrees with plan.     - XR Ankle Left G/E 3 Views; Future  - Ankle/Foot Bracing Supplies Order Walking Boot; Left; Pneumatic; Short  - Orthopedic  Referral; Future          30 minutes spent by me on the date of the encounter doing chart review, history and exam, documentation and further activities per the note  BMI  Estimated body mass index is 38.62 kg/m  as calculated from the following:    Height as of 5/6/24: 1.626 m (5' 4\").    Weight as of this encounter: 102.1 kg (225 lb).             Return in about 1 week (around 6/4/2024) for Follow up.              Subjective   Chief Complaint   Patient presents with    Foot Pain     Left foot pain, top of foot and outer side of foot hurts with swelling       HPI     Foot problem     Onset of symptoms was 1 week(s) ago.  Course of illness is same.    Severity moderate  Current and Associated symptoms: left ankle pain and swelling   Treatment measures tried include None tried.  Predisposing factors include None.                      Objective    BP (!) 187/81   Pulse 70   Temp 98  F (36.7  C) (Tympanic)   Resp 16   Wt 102.1 kg (225 lb)   SpO2 98%   BMI 38.62 kg/m    Body mass index is 38.62 kg/m .  Physical Exam  Constitutional:       General: She is not in acute distress.  Musculoskeletal:      Comments: No obvious deformity, erythema, or ecchymosis of the left ankle. There is mild swelling and tenderness with palpation around the joint line. Decreased ROM due to pain. Lower extremity CMS intact.      Neurological:      Mental Status: She is alert.            Xray left ankle - possible avascular necrosis         Signed Electronically by: Bhavna Parkinson PA-C    "

## 2024-05-28 NOTE — LETTER
May 28, 2024      Divina Delgadillo  [address] [facility] [id number]        To Whom It May Concern:    Divina Delgadillo  was seen and treated in clinic. Please excuse from work until she is seen by podiatry.         Sincerely,          Bhavna Parkinson PA-C

## 2024-05-28 NOTE — TELEPHONE ENCOUNTER
See nurse triage encounter dated today. Closing this encounter.         Seen in office 4/24   Pain in joint involving ankle and foot, left  -probably related to problems with gait, ongoing since femur surgery for fracture, Patient had more pain after walking on the weekend, better now  -if continue to have issues will plan physical therapy         Tashia Gamez RN

## 2024-05-28 NOTE — PROGRESS NOTES
Medication Therapy Management (MTM) Encounter    ASSESSMENT:                            Medication Adherence/Access: No issues identified    Migraines: no longer dizzy, headaches stable.     PLAN:                            1. Continue current drug therapy.    Follow-up: 6 months    SUBJECTIVE/OBJECTIVE:                          Divina Delgadillo is a 39 year old female called for a follow-up visit from 5/7/2024.       Reason for visit: follow up MTM visit.    Tobacco: She reports that she quit smoking about 3 months ago. Her smoking use included cigarettes. She started smoking about 6 months ago. She has a 0.1 pack-year smoking history. She has been exposed to tobacco smoke. She has never used smokeless tobacco.  Alcohol: not currently using    Medication Adherence/Access: no issues reported    Migraines:   Topiramate 25 mg once daily    Stopped patient's morning topiramate at our last visit - she is no longer dizzy. Headaches continue to be well controlled.    ----------------    I spent 10 minutes with this patient today. All changes were made via collaborative practice agreement with VERONIQUE Spears CNP. A copy of the visit note was provided to the patient's provider(s).    A summary of these recommendations was sent via [facility].    Leana Hollis, Jessica  Medication Therapy Management     Telemedicine Visit Details  Type of service:  Telephone visit  Start Time:  3:00 PM  End Time:  3:10 PM     Medication Therapy Recommendations  No medication therapy recommendations to display

## 2024-05-28 NOTE — TELEPHONE ENCOUNTER
[facility] Call Center    Phone Message    May a detailed message be left on voicemail: yes     Reason for Call: Other: DOI around 05/15 / XR / Left Ankle / [facility] / Currently in a boot     Action Taken: Message routed to:  [facility] Orthopedics    Travel Screening: Not Applicable

## 2024-05-29 ENCOUNTER — VIRTUAL VISIT (OUTPATIENT)
Dept: PHARMACY | Facility: CLINIC | Age: 39
End: 2024-05-29
Payer: COMMERCIAL

## 2024-05-29 DIAGNOSIS — G43.719 INTRACTABLE CHRONIC MIGRAINE WITHOUT AURA AND WITHOUT STATUS MIGRAINOSUS: Primary | ICD-10-CM

## 2024-05-29 PROCEDURE — 99606 MTMS BY PHARM EST 15 MIN: CPT | Mod: 93 | Performed by: PHARMACIST

## 2024-05-29 NOTE — PATIENT INSTRUCTIONS
"Recommendations from today's MTM visit:                                                       Divina,    It was a pleasure talking with you! We discussed the followin. Continue current drug therapy.    Follow-up: 6 months    It was great speaking with you today.  I value your experience and would be very thankful for your time in providing feedback in our clinic survey. In the next few days, you may receive an email or text message from [employer] with a link to a survey related to your  clinical pharmacist.\"     To schedule another MTM appointment, please call the clinic directly or you may call the MTM scheduling line at [phone] or toll-free at [phone].     My Clinical Pharmacist's contact information:                                                      Please feel free to contact me with any questions or concerns you have.      Keep up the good work!!    Leana Hollis, PharmD  [phone] in clinic on Tuesday and Wednesday        "

## 2024-05-29 NOTE — TELEPHONE ENCOUNTER
Patient comes to clinic accompanied by grand-daughter April for follow up anticoagulation visit.   Last INR on 2/18 was 1.8. Pt took one-time increased dose of warfarin 7.5 mg that night.  Today's INR is 2.0 and is within goal range.    Current warfarin total weekly dose of 35 mg verified.  Informed the INR result is within therapeutic range and instructed to maintain current dose per protocol. Discussed dose and return date of 4 weeks for next INR. See Anticoagulation flowsheet.    Patient denies bleeding or bruising. Pt denies changes to medications or diet. Pt ambulated to clinic with use of walker.    Dr. Martinez is in the office today supervising the treatment.    Instructed to contact the clinic with any unusual bleeding or bruising, any changes in medications, diet, health status, lifestyle, or any other changes, questions or concerns. Verbalized understanding of all discussed.      Orthopedic/Sports Medicine Fracture Triage    Incoming call/or message from call center member.    Fracture type: Ankle.    The patient is in a   walking boot .    Date of injury a week ago .    Triaged by: Dr. gray .    Determined to be managed Non operatively.    Needs to be seen in 1-2 weeks.    Additional Comments/information:     Bhavna Marie LPN

## 2024-05-29 NOTE — TELEPHONE ENCOUNTER
Patient confirmed scheduled appointment:  Date: 6/5/24  Time: 9:00am  Visit type: New  Provider: Dr Cuba  Location: [facility]

## 2024-05-30 ENCOUNTER — LAB (OUTPATIENT)
Dept: LAB | Facility: CLINIC | Age: 39
End: 2024-05-30
Payer: COMMERCIAL

## 2024-05-30 DIAGNOSIS — F25.0 SCHIZOAFFECTIVE DISORDER, BIPOLAR TYPE (H): ICD-10-CM

## 2024-05-30 LAB
BASOPHILS # BLD AUTO: 0.1 10E3/UL (ref 0–0.2)
BASOPHILS NFR BLD AUTO: 1 %
EOSINOPHIL # BLD AUTO: 0.3 10E3/UL (ref 0–0.7)
EOSINOPHIL NFR BLD AUTO: 2 %
ERYTHROCYTE [DISTWIDTH] IN BLOOD BY AUTOMATED COUNT: 16.3 % (ref 10–15)
HCT VFR BLD AUTO: 38.6 % (ref 35–47)
HGB BLD-MCNC: 11.5 G/DL (ref 11.7–15.7)
IMM GRANULOCYTES # BLD: 0 10E3/UL
IMM GRANULOCYTES NFR BLD: 0 %
LYMPHOCYTES # BLD AUTO: 4.5 10E3/UL (ref 0.8–5.3)
LYMPHOCYTES NFR BLD AUTO: 32 %
MCH RBC QN AUTO: 27.3 PG (ref 26.5–33)
MCHC RBC AUTO-ENTMCNC: 29.8 G/DL (ref 31.5–36.5)
MCV RBC AUTO: 92 FL (ref 78–100)
MONOCYTES # BLD AUTO: 0.8 10E3/UL (ref 0–1.3)
MONOCYTES NFR BLD AUTO: 6 %
NEUTROPHILS # BLD AUTO: 8.4 10E3/UL (ref 1.6–8.3)
NEUTROPHILS NFR BLD AUTO: 60 %
PLATELET # BLD AUTO: 508 10E3/UL (ref 150–450)
RBC # BLD AUTO: 4.22 10E6/UL (ref 3.8–5.2)
WBC # BLD AUTO: 14.1 10E3/UL (ref 4–11)

## 2024-05-30 PROCEDURE — 36415 COLL VENOUS BLD VENIPUNCTURE: CPT

## 2024-05-30 PROCEDURE — 85025 COMPLETE CBC W/AUTO DIFF WBC: CPT

## 2024-06-03 DIAGNOSIS — G43.009 MIGRAINE WITHOUT AURA AND WITHOUT STATUS MIGRAINOSUS, NOT INTRACTABLE: ICD-10-CM

## 2024-06-03 DIAGNOSIS — G43.719 INTRACTABLE CHRONIC MIGRAINE WITHOUT AURA AND WITHOUT STATUS MIGRAINOSUS: ICD-10-CM

## 2024-06-03 RX ORDER — TOPIRAMATE 50 MG/1
50 TABLET, FILM COATED ORAL 2 TIMES DAILY
Qty: 60 TABLET | Refills: 3 | OUTPATIENT
Start: 2024-06-03

## 2024-06-05 ENCOUNTER — OFFICE VISIT (OUTPATIENT)
Dept: ORTHOPEDICS | Facility: CLINIC | Age: 39
End: 2024-06-05
Payer: COMMERCIAL

## 2024-06-05 ENCOUNTER — PRE VISIT (OUTPATIENT)
Dept: ORTHOPEDICS | Facility: CLINIC | Age: 39
End: 2024-06-05

## 2024-06-05 DIAGNOSIS — M76.72 PERONEAL TENDINITIS OF LEFT LOWER EXTREMITY: Primary | ICD-10-CM

## 2024-06-05 DIAGNOSIS — M87.073: ICD-10-CM

## 2024-06-05 DIAGNOSIS — M25.572 ACUTE LEFT ANKLE PAIN: ICD-10-CM

## 2024-06-05 PROCEDURE — 99203 OFFICE O/P NEW LOW 30 MIN: CPT | Performed by: FAMILY MEDICINE

## 2024-06-05 NOTE — TELEPHONE ENCOUNTER
DIAGNOSIS: DOI around 05/15 / XR / Left Ankle / [facility] / Currently in a boot     APPOINTMENT DATE: 6.5.24   NOTES STATUS DETAILS   DISCHARGE REPORT from the ER Internal 5.28.24  Tesha DAMIAN   MEDICATION LIST Internal    XRAYS (IMAGES & REPORTS) Internal 5.28.24  XR Ankle Left

## 2024-06-05 NOTE — PATIENT INSTRUCTIONS
[facility] Outpatient Physical Therapy Locations    To schedule an appointment please call our scheduling department at [phone]    To fax a referral to be scheduled fax to [phone]    [place]: [address], [facility]: [address] [facility]: [address], [facility]: [address], [facility]: [address], [facility]: [address] [facility]: [address] [place], [place]  [place]: [address] [facility] [address] [facility] , [address], [facility]: [address] [facility] [address] [facility] , [address], [facility]: [address] [facility]: [address], [facility]: [address], [facility]: [address], [facility]: [address], [facility]: [address]: [address] [facility] [address], [facility]: [facility]: [address], [address], [facility]: [address], [facility]: [address], [place], [place]  [place]: [address] [facility]: [address] [place] [address]: [address], [facility]: [address] [place], [place]  [place]: [address], [facility]: [address], [facility]: [address] [facility] [place], [place]  [facility]: [address] [place], [place]  [facility]]: [address] [place] [address]  [place]: [address] [facility] [facility], [address], [facility] [address] [facility],  [facility]: [address] [facility] [address] [place], [place]

## 2024-06-05 NOTE — LETTER
6/5/2024      RE: Divina Delgadillo  [id number] [facility] [address]     Dear Colleague,    Thank you for referring your patient, Divina Delgadillo, to the [facility] [place]. Please see a copy of my visit note below.    Sports Medicine Clinic Visit    PCP: Jaleesa Velasquez    Divina Delgadillo is a 39 year old female who is seen  in consultation at the request of Dr. Parkinson presenting with left lateral shin/ankle pain.    Injury: No CIERRA     Location of Pain: left ankle   Duration of Pain: 1.5 week(s)  Rating of Pain: 9/10  Pain is better with: Walking boot   Pain is worse with: Walking   Additional Features: Yes   Treatment so far consists of: Walking boot   Prior History of related problems: Left femur with intramedullary ulysses and screw fixation.      There were no vitals taken for this visit.       Left ankle pain    Patient presented to urgent care 5/28/2024 with 1 week of symptoms of left-sided weightbearing lateral ankle pain      Ankle x-ray in urgent care 5/28/2024 showed an area of lucency over the base of the anterior calcaneal process on the lateral view that could be consistent with artifactual summation, or possibly nondisplaced fracture.  In addition incidental finding of sclerosis of the talar dome suggested previous evidence of avascular necrosis.  No articular surface collapse or joint space narrowing noted.    Patient history of previous closed fracture of the left femur with intramedullary ulysses and screw fixation.    Patient indicates that she has been on chronic mental health medicines since high school age.        Imaging study below reviewed by me:  XR ANKLE LEFT G/E 3 VIEWS   5/28/2024 2:29 PM      HISTORY: Acute left ankle pain  COMPARISON: None.                                                                       IMPRESSION:      1.  There is sclerosis within the talar dome suggesting avascular  necrosis. No articular surface collapse or  tibiotalar joint space  narrowing is identified. MRI could better evaluate as clinically  indicated.  2.  There is some lucency projecting over the base of the anterior  calcaneal process on the lateral view, which could be artifactual due  to summation shadow but if there is pain at this location and a  history of trauma a nondisplaced fracture would be difficult to  exclude. This could also be better evaluated with MRI if clinically  indicated.     ELIO RICO MD           XR FOOT RIGHT 3 OR MORE VIEWS 1/9/2017 [facility]    HISTORY: Foot pain.     COMPARISON: Right foot 10/20/2015.     FINDINGS: There is a small bunion with mild metatarsus primus varus and secondary hallux valgus. Mild degenerative changes of the 1st MTP joint. No acute fractures. Normal bone density.         PMH:  Past Medical History:   Diagnosis Date    Acquired asplenia     Acute pain of left knee 03/22/2019    Acute-on-chronic kidney injury  (H24) 03/22/2019    ARF (acute renal failure) (H24) 03/23/2019    Asthma     Bipolar disorder (H)     Cardiac murmur     Cervical high risk HPV (human papillomavirus) test positive 06/20/2017    Chiari malformation type I (H)     Chronic bilateral low back pain without sciatica     Deep venous thrombosis of arm (H) 01/06/2017    ultrasound 1/6/2017-  venous thrombus in the antecubital fossa region and the left forearm as described    Depressive disorder     DVT (deep venous thrombosis) (H)     DVT of upper extremity (deep vein thrombosis) (H) 2021    Esophageal reflux     GERD    Hypertension     Insomnia     Leukocytosis     Mild intermittent asthma     Morbid obesity (H)     Mucinous neoplasm of pancreas     NAFL (nonalcoholic fatty liver)     Neck pain     Nicotine abuse     Other specified types of schizophrenia, unspecified condition     Schizoaffective disorder, depressive type (H)     Stage 3a chronic kidney disease (CKD) (H)     Type 2 diabetes mellitus (H)     Ulnar neuropathy of both  upper extremities     Vitamin D insufficiency        Active problem list:  Patient Active Problem List   Diagnosis    Esophageal reflux    NAFL (nonalcoholic fatty liver)    Essential hypertension    Hyperlipidemia LDL goal <100    Stage 3 chronic kidney disease    Mucinous neoplasm of pancreas    Type 2 diabetes mellitus with stage 3 chronic kidney disease, with long-term current use of insulin (H)    Bipolar disorder (H)    Cervical high risk HPV (human papillomavirus) test positive    IUD (intrauterine device) in place    Morbid obesity (H)    Mild intermittent asthma with acute exacerbation    Nicotine abuse    Chronic leukocytosis    Acquired asplenia    Borderline personality disorder (H)    PTSD (post-traumatic stress disorder)    Long term (current) use of anticoagulants    Orthostatic hypotension    Tobacco abuse    Vitamin D insufficiency    Depressive disorder    Schizoaffective disorder, depressive type (H)    Cardiac murmur    History of DVT of arm  (deep vein thrombosis)    Insomnia, unspecified type    Ulnar neuropathy of both upper extremities    Chiari malformation type I (H)    Deep venous thrombosis of arm (H)    Neutrophilic leukocytosis    Other fracture of left femur, initial encounter for closed fracture (H)       FH:  Family History   Problem Relation Age of Onset    Respiratory Mother         ASTHMA    Allergies Mother     Depression Mother     Chronic Obstructive Pulmonary Disease Mother     Anxiety Disorder Mother     Mental Illness Mother     Asthma Mother     Heart Disease Father         HEART VALVE REPLACEMENT    Hypertension Father     Diabetes Father     Depression Father     Anxiety Disorder Father     Mental Illness Father     Obesity Father     Sleep Apnea Maternal Grandfather     Respiratory Brother     Allergies Brother     Respiratory Sister     Allergies Sister     Depression Sister     Respiratory Sister     Allergies Sister     Depression Sister     Kidney Disease No family  hx of        :  Social History     Socioeconomic History    Marital status: Single     Spouse name: Not on file    Number of children: 0    Years of education: Not on file    Highest education level: Not on file   Occupational History    Not on file   Tobacco Use    Smoking status: Former     Current packs/day: 0.00     Average packs/day: 0.5 packs/day for 0.2 years (0.1 ttl pk-yrs)     Types: Cigarettes     Start date: 2023     Quit date: 2024     Years since quittin.3     Passive exposure: Yes    Smokeless tobacco: Never    Tobacco comments:     A pack every 3 days   Vaping Use    Vaping status: Every Day    Substances: Nicotine, Flavoring    Devices: Disposable   Substance and Sexual Activity    Alcohol use: No     Comment: She is in AA and just got her 18 month medalion (2024)    Drug use: No    Sexual activity: Yes     Partners: Female     Birth control/protection: I.U.D.     Comment: Both male and female    Other Topics Concern    Parent/sibling w/ CABG, MI or angioplasty before 65F 55M? No   Social History Narrative    Not on file     Social Determinants of Health     Financial Resource Strain: Low Risk  (2024)    Financial Resource Strain     Within the past 12 months, have you or your family members you live with been unable to get utilities (heat, electricity) when it was really needed?: No   Food Insecurity: Low Risk  (2024)    Food Insecurity     Within the past 12 months, did you worry that your food would run out before you got money to buy more?: No     Within the past 12 months, did the food you bought just not last and you didn t have money to get more?: No   Transportation Needs: Low Risk  (2024)    Transportation Needs     Within the past 12 months, has lack of transportation kept you from medical appointments, getting your medicines, non-medical meetings or appointments, work, or from getting things that you need?: No   Physical Activity: Unknown (2024)     Exercise Vital Sign     Days of Exercise per Week: 2 days     Minutes of Exercise per Session: Not on file   Stress: No Stress Concern Present (4/24/2024)    [employer] - Occupational Stress Questionnaire     Feeling of Stress : Not at all   Social Connections: Unknown (4/24/2024)    Social Connection and Isolation Panel [NHANES]     Frequency of Communication with Friends and Family: Not on file     Frequency of Social Gatherings with Friends and Family: More than three times a week     Attends Buddhism Services: Not on file     Active Member of Clubs or Organizations: Not on file     Attends Club or Organization Meetings: Not on file     Marital Status: Not on file   Interpersonal Safety: Low Risk  (5/9/2024)    Interpersonal Safety     Do you feel physically and emotionally safe where you currently live?: Yes     Within the past 12 months, have you been hit, slapped, kicked or otherwise physically hurt by someone?: No     Within the past 12 months, have you been humiliated or emotionally abused in other ways by your partner or ex-partner?: No   Housing Stability: Low Risk  (4/24/2024)    Housing Stability     Do you have housing? : Yes     Are you worried about losing your housing?: No       MEDS:  See EMR, reviewed  ALL:  See EMR, reviewed    REVIEW OF SYSTEMS:  CONSTITUTIONAL:NEGATIVE for fever, chills, change in weight  INTEGUMENTARY/SKIN: NEGATIVE for worrisome rashes, moles or lesions  EYES: NEGATIVE for vision changes or irritation  ENT/MOUTH: NEGATIVE for ear, mouth and throat problems  RESP:NEGATIVE for significant cough or SOB  BREAST: NEGATIVE for masses, tenderness or discharge  CV: NEGATIVE for chest pain, palpitations or peripheral edema  GI: NEGATIVE for nausea, abdominal pain, heartburn, or change in bowel habits  :NEGATIVE for frequency, dysuria, or hematuria  :NEGATIVE for frequency, dysuria, or hematuria  NEURO: NEGATIVE for weakness, dizziness or  paresthesias  ENDOCRINE: NEGATIVE for temperature intolerance, skin/hair changes  HEME/ALLERGY/IMMUNE: NEGATIVE for bleeding problems  PSYCHIATRIC: NEGATIVE for changes in mood or affect      Objective: She points to the lateral left shin and lateral malleolus as the area of discomfort.  She is tender along the peroneal tendon as it approaches the lateral malleolus posteriorly and as it approaches the proximal fifth metatarsal.  Nontender over the bony fifth metatarsal or fourth metatarsal.  Nontender the Achilles tendon or posterior tibial tendon.  No effusion at the ankle.  Nontender over the anterior talus.  Anterior and posterior impingement signs of the ankle are negative.  Overlying skin is intact.  Appropriate conversation and affect.    I personally reviewed the patient the x-rays of her ankle that show the likely incidental finding of avascular necrosis, with no signs of joint collapse or DJD.    Assessment: Peroneal tendinitis left lower extremity x 1 week.  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    Plan: Patient denies over time recurrent episodes of ankle mechanical symptoms of locking catching, instability.  Patient will use the cam walker boot that she has for the next 3 weeks, she can remove it for sleep and for simple activities about the home.  She will see physical therapy and [facility] for the peroneal tendinitis.  She will look for slow improvements over the next 4 to 6 weeks and follow-up as needed.  We discussed that if over time she develops impingement complaints or mechanical plaints about the left ankle that an MRI could be considered to evaluate the chondral surface.  We discussed over-the-counter diclofenac gel for pain relief.  Patient avoids Tylenol and nonsteroidal and inflammatories because of a history of liver disease.      Again, thank you for allowing me to participate in the care of your patient.      Sincerely,    Anupam Cuba MD

## 2024-06-05 NOTE — PROGRESS NOTES
Sports Medicine Clinic Visit    PCP: Jaleesa Velasquez HOANG Delgadillo is a 39 year old female who is seen  in consultation at the request of Dr. Parkinson presenting with left lateral shin/ankle pain.    Injury: No CIERRA     Location of Pain: left ankle   Duration of Pain: 1.5 week(s)  Rating of Pain: 9/10  Pain is better with: Walking boot   Pain is worse with: Walking   Additional Features: Yes   Treatment so far consists of: Walking boot   Prior History of related problems: Left femur with intramedullary ulysses and screw fixation.      There were no vitals taken for this visit.       Left ankle pain    Patient presented to urgent care 5/28/2024 with 1 week of symptoms of left-sided weightbearing lateral ankle pain      Ankle x-ray in urgent care 5/28/2024 showed an area of lucency over the base of the anterior calcaneal process on the lateral view that could be consistent with artifactual summation, or possibly nondisplaced fracture.  In addition incidental finding of sclerosis of the talar dome suggested previous evidence of avascular necrosis.  No articular surface collapse or joint space narrowing noted.    Patient history of previous closed fracture of the left femur with intramedullary ulysses and screw fixation.    Patient indicates that she has been on chronic mental health medicines since high school age.        Imaging study below reviewed by me:  XR ANKLE LEFT G/E 3 VIEWS   5/28/2024 2:29 PM      HISTORY: Acute left ankle pain  COMPARISON: None.                                                                       IMPRESSION:      1.  There is sclerosis within the talar dome suggesting avascular  necrosis. No articular surface collapse or tibiotalar joint space  narrowing is identified. MRI could better evaluate as clinically  indicated.  2.  There is some lucency projecting over the base of the anterior  calcaneal process on the lateral view, which could be artifactual due  to summation shadow but if  there is pain at this location and a  history of trauma a nondisplaced fracture would be difficult to  exclude. This could also be better evaluated with MRI if clinically  indicated.     ELIO RICO MD           XR FOOT RIGHT 3 OR MORE VIEWS 1/9/2017 [facility]    HISTORY: Foot pain.     COMPARISON: Right foot 10/20/2015.     FINDINGS: There is a small bunion with mild metatarsus primus varus and secondary hallux valgus. Mild degenerative changes of the 1st MTP joint. No acute fractures. Normal bone density.         PMH:  Past Medical History:   Diagnosis Date    Acquired asplenia     Acute pain of left knee 03/22/2019    Acute-on-chronic kidney injury  (H24) 03/22/2019    ARF (acute renal failure) (H24) 03/23/2019    Asthma     Bipolar disorder (H)     Cardiac murmur     Cervical high risk HPV (human papillomavirus) test positive 06/20/2017    Chiari malformation type I (H)     Chronic bilateral low back pain without sciatica     Deep venous thrombosis of arm (H) 01/06/2017    ultrasound 1/6/2017-  venous thrombus in the antecubital fossa region and the left forearm as described    Depressive disorder     DVT (deep venous thrombosis) (H)     DVT of upper extremity (deep vein thrombosis) (H) 2021    Esophageal reflux     GERD    Hypertension     Insomnia     Leukocytosis     Mild intermittent asthma     Morbid obesity (H)     Mucinous neoplasm of pancreas     NAFL (nonalcoholic fatty liver)     Neck pain     Nicotine abuse     Other specified types of schizophrenia, unspecified condition     Schizoaffective disorder, depressive type (H)     Stage 3a chronic kidney disease (CKD) (H)     Type 2 diabetes mellitus (H)     Ulnar neuropathy of both upper extremities     Vitamin D insufficiency        Active problem list:  Patient Active Problem List   Diagnosis    Esophageal reflux    NAFL (nonalcoholic fatty liver)    Essential hypertension    Hyperlipidemia LDL goal <100    Stage 3 chronic kidney disease     Mucinous neoplasm of pancreas    Type 2 diabetes mellitus with stage 3 chronic kidney disease, with long-term current use of insulin (H)    Bipolar disorder (H)    Cervical high risk HPV (human papillomavirus) test positive    IUD (intrauterine device) in place    Morbid obesity (H)    Mild intermittent asthma with acute exacerbation    Nicotine abuse    Chronic leukocytosis    Acquired asplenia    Borderline personality disorder (H)    PTSD (post-traumatic stress disorder)    Long term (current) use of anticoagulants    Orthostatic hypotension    Tobacco abuse    Vitamin D insufficiency    Depressive disorder    Schizoaffective disorder, depressive type (H)    Cardiac murmur    History of DVT of arm  (deep vein thrombosis)    Insomnia, unspecified type    Ulnar neuropathy of both upper extremities    Chiari malformation type I (H)    Deep venous thrombosis of arm (H)    Neutrophilic leukocytosis    Other fracture of left femur, initial encounter for closed fracture (H)       FH:  Family History   Problem Relation Age of Onset    Respiratory Mother         ASTHMA    Allergies Mother     Depression Mother     Chronic Obstructive Pulmonary Disease Mother     Anxiety Disorder Mother     Mental Illness Mother     Asthma Mother     Heart Disease Father         HEART VALVE REPLACEMENT    Hypertension Father     Diabetes Father     Depression Father     Anxiety Disorder Father     Mental Illness Father     Obesity Father     Sleep Apnea Maternal Grandfather     Respiratory Brother     Allergies Brother     Respiratory Sister     Allergies Sister     Depression Sister     Respiratory Sister     Allergies Sister     Depression Sister     Kidney Disease No family hx of        SH:  Social History     Socioeconomic History    Marital status: Single     Spouse name: Not on file    Number of children: 0    Years of education: Not on file    Highest education level: Not on file   Occupational History    Not on file   Tobacco Use     Smoking status: Former     Current packs/day: 0.00     Average packs/day: 0.5 packs/day for 0.2 years (0.1 ttl pk-yrs)     Types: Cigarettes     Start date: 2023     Quit date: 2024     Years since quittin.3     Passive exposure: Yes    Smokeless tobacco: Never    Tobacco comments:     A pack every 3 days   Vaping Use    Vaping status: Every Day    Substances: Nicotine, Flavoring    Devices: Disposable   Substance and Sexual Activity    Alcohol use: No     Comment: She is in AA and just got her 18 month medalion (2024)    Drug use: No    Sexual activity: Yes     Partners: Female     Birth control/protection: I.U.D.     Comment: Both male and female    Other Topics Concern    Parent/sibling w/ CABG, MI or angioplasty before 65F 55M? No   Social History Narrative    Not on file     Social Determinants of Health     Financial Resource Strain: Low Risk  (2024)    Financial Resource Strain     Within the past 12 months, have you or your family members you live with been unable to get utilities (heat, electricity) when it was really needed?: No   Food Insecurity: Low Risk  (2024)    Food Insecurity     Within the past 12 months, did you worry that your food would run out before you got money to buy more?: No     Within the past 12 months, did the food you bought just not last and you didn t have money to get more?: No   Transportation Needs: Low Risk  (2024)    Transportation Needs     Within the past 12 months, has lack of transportation kept you from medical appointments, getting your medicines, non-medical meetings or appointments, work, or from getting things that you need?: No   Physical Activity: Unknown (2024)    Exercise Vital Sign     Days of Exercise per Week: 2 days     Minutes of Exercise per Session: Not on file   Stress: No Stress Concern Present (2024)    [employer] - Occupational Stress Questionnaire     Feeling of Stress : Not at  all   Social Connections: Unknown (4/24/2024)    Social Connection and Isolation Panel [NHANES]     Frequency of Communication with Friends and Family: Not on file     Frequency of Social Gatherings with Friends and Family: More than three times a week     Attends Tenriism Services: Not on file     Active Member of Clubs or Organizations: Not on file     Attends Club or Organization Meetings: Not on file     Marital Status: Not on file   Interpersonal Safety: Low Risk  (5/9/2024)    Interpersonal Safety     Do you feel physically and emotionally safe where you currently live?: Yes     Within the past 12 months, have you been hit, slapped, kicked or otherwise physically hurt by someone?: No     Within the past 12 months, have you been humiliated or emotionally abused in other ways by your partner or ex-partner?: No   Housing Stability: Low Risk  (4/24/2024)    Housing Stability     Do you have housing? : Yes     Are you worried about losing your housing?: No       MEDS:  See EMR, reviewed  ALL:  See EMR, reviewed    REVIEW OF SYSTEMS:  CONSTITUTIONAL:NEGATIVE for fever, chills, change in weight  INTEGUMENTARY/SKIN: NEGATIVE for worrisome rashes, moles or lesions  EYES: NEGATIVE for vision changes or irritation  ENT/MOUTH: NEGATIVE for ear, mouth and throat problems  RESP:NEGATIVE for significant cough or SOB  BREAST: NEGATIVE for masses, tenderness or discharge  CV: NEGATIVE for chest pain, palpitations or peripheral edema  GI: NEGATIVE for nausea, abdominal pain, heartburn, or change in bowel habits  :NEGATIVE for frequency, dysuria, or hematuria  :NEGATIVE for frequency, dysuria, or hematuria  NEURO: NEGATIVE for weakness, dizziness or paresthesias  ENDOCRINE: NEGATIVE for temperature intolerance, skin/hair changes  HEME/ALLERGY/IMMUNE: NEGATIVE for bleeding problems  PSYCHIATRIC: NEGATIVE for changes in mood or affect      Objective: She points to the lateral left shin and lateral malleolus as the area of  discomfort.  She is tender along the peroneal tendon as it approaches the lateral malleolus posteriorly and as it approaches the proximal fifth metatarsal.  Nontender over the bony fifth metatarsal or fourth metatarsal.  Nontender the Achilles tendon or posterior tibial tendon.  No effusion at the ankle.  Nontender over the anterior talus.  Anterior and posterior impingement signs of the ankle are negative.  Overlying skin is intact.  Appropriate conversation and affect.    I personally reviewed the patient the x-rays of her ankle that show the likely incidental finding of avascular necrosis, with no signs of joint collapse or DJD.    Assessment: Peroneal tendinitis left lower extremity x 1 week.  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    Plan: Patient denies over time recurrent episodes of ankle mechanical symptoms of locking catching, instability.  Patient will use the cam walker boot that she has for the next 3 weeks, she can remove it for sleep and for simple activities about the home.  She will see physical therapy and [facility] for the peroneal tendinitis.  She will look for slow improvements over the next 4 to 6 weeks and follow-up as needed.  We discussed that if over time she develops impingement complaints or mechanical plaints about the left ankle that an MRI could be considered to evaluate the chondral surface.  We discussed over-the-counter diclofenac gel for pain relief.  Patient avoids Tylenol and nonsteroidal and inflammatories because of a history of liver disease.

## 2024-06-07 ENCOUNTER — OFFICE VISIT (OUTPATIENT)
Dept: FAMILY MEDICINE | Facility: CLINIC | Age: 39
End: 2024-06-07
Payer: COMMERCIAL

## 2024-06-07 VITALS
RESPIRATION RATE: 20 BRPM | HEIGHT: 65 IN | DIASTOLIC BLOOD PRESSURE: 60 MMHG | WEIGHT: 231 LBS | HEART RATE: 78 BPM | BODY MASS INDEX: 38.49 KG/M2 | TEMPERATURE: 97.3 F | OXYGEN SATURATION: 97 % | SYSTOLIC BLOOD PRESSURE: 138 MMHG

## 2024-06-07 DIAGNOSIS — M54.50 CHRONIC BILATERAL LOW BACK PAIN WITHOUT SCIATICA: ICD-10-CM

## 2024-06-07 DIAGNOSIS — R29.898 WEAKNESS OF FOOT, RIGHT: Primary | ICD-10-CM

## 2024-06-07 DIAGNOSIS — M79.661 PAIN OF RIGHT LOWER LEG: ICD-10-CM

## 2024-06-07 DIAGNOSIS — R20.0 NUMBNESS OF RIGHT FOOT: ICD-10-CM

## 2024-06-07 DIAGNOSIS — R20.0 NUMBNESS OF RIGHT ANTERIOR THIGH: ICD-10-CM

## 2024-06-07 DIAGNOSIS — G89.29 CHRONIC BILATERAL LOW BACK PAIN WITHOUT SCIATICA: ICD-10-CM

## 2024-06-07 PROCEDURE — 99214 OFFICE O/P EST MOD 30 MIN: CPT | Performed by: NURSE PRACTITIONER

## 2024-06-07 PROCEDURE — G2211 COMPLEX E/M VISIT ADD ON: HCPCS | Performed by: NURSE PRACTITIONER

## 2024-06-07 RX ORDER — OXYCODONE HYDROCHLORIDE 5 MG/1
5 TABLET ORAL 2 TIMES DAILY PRN
Qty: 12 TABLET | Refills: 0 | Status: SHIPPED | OUTPATIENT
Start: 2024-06-07

## 2024-06-07 ASSESSMENT — PAIN SCALES - GENERAL: PAINLEVEL: SEVERE PAIN (7)

## 2024-06-07 NOTE — PATIENT INSTRUCTIONS
Oxycodone 1 tablet twice daily as needed for severe pain  Tylenol may take up to 3000 mg per day    Schedule lumbar spine CT and EMG study

## 2024-06-07 NOTE — PROGRESS NOTES
Assessment & Plan     Weakness of foot, right  -recommended EMG study to check for possible peroneal nerve neuropathy  -due to worsening lower back pain recommended patient to schedule lumbar spine CT   - CT Lumbar Spine w/o Contrast; Future  - EMG; Future    Pain of right lower leg    - CT Lumbar Spine w/o Contrast; Future  - oxyCODONE (ROXICODONE) 5 MG tablet; Take 1 tablet (5 mg) by mouth 2 times daily as needed for pain    Chronic bilateral low back pain without sciatica    - CT Lumbar Spine w/o Contrast; Future  - oxyCODONE (ROXICODONE) 5 MG tablet; Take 1 tablet (5 mg) by mouth 2 times daily as needed for pain    Numbness of right foot    - CT Lumbar Spine w/o Contrast; Future  - EMG; Future    Numbness of right anterior thigh  -possible meralgia paresthetica   - EMG; Future        Subjective   Divina is a 39 year old, presenting for the following health issues: patient complains of right foot pain, weakness, numbness. Also having numbness in her anterior right thigh, numbness on the lateral side of the right lower leg. States symptoms on and off, but worsened over the last 1-2 weeks. Complains of lower back pain and asking for pain medication.         6/7/2024     7:08 AM   Additional Questions   Roomed by Jazmin Gramajo   Accompanied by self         6/7/2024     7:08 AM   Patient Reported Additional Medications   Patient reports taking the following new medications none     History of Present Illness       Reason for visit:  Foot and numb leg issues    She eats 0-1 servings of fruits and vegetables daily.She consumes 0 sweetened beverage(s) daily.She exercises with enough effort to increase her heart rate 10 to 19 minutes per day.  She exercises with enough effort to increase her heart rate 3 or less days per week.   She is taking medications regularly.     Pain History:  When did you first notice your pain? X 3 weeks   Have you seen anyone else for your pain? Yes - Specialist on Wednesday  How has your  "pain affected your ability to work? 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 Musculoskeletal problem/pain  Onset/Duration: x 3 weeks  Description  Location: foot - left  Joint Swelling: YES  Redness: No but bruising  Pain: YES  Warmth: No  Intensity:  7/10  Progression of Symptoms:  Slightly improving  Accompanying signs and symptoms:   Fevers: No  Numbness/tingling/weakness: No  History  Trauma to the area: No  Recent illness:  No  Previous similar problem: No  Previous evaluation:  YES  Precipitating or alleviating factors:  Aggravating factors include: Standing from a sitting position.  Therapies tried and outcome: ice and support wrap. Starts PT in 2 weeks.    Numbness in right leg. Patient states Jaleesa knows about this. Patient states her right foot has started to get some numb spots. Patient states she wants Jaleesa to be aware of this.          Review of Systems  Constitutional, HEENT, cardiovascular, pulmonary, gi and gu systems are negative, except as otherwise noted.      Objective    /60 (BP Location: Left arm, Patient Position: Sitting, Cuff Size: Adult Large)   Pulse 78   Temp 97.3  F (36.3  C) (Tympanic)   Resp 20   Ht 1.657 m (5' 5.25\")   Wt 104.8 kg (231 lb)   SpO2 97%   BMI 38.15 kg/m    Body mass index is 38.15 kg/m .  Physical Exam   GENERAL: alert and no distress  EYES: Eyes grossly normal to inspection, PERRL and conjunctivae and sclerae normal  CV: regular rate and rhythm, normal S1 S2, no S3 or S4, no murmur, click or rub, no peripheral edema   MS: no gross musculoskeletal defects noted, no edema  NEURO: Normal strength and tone, sensory deficit anterior thigh, lateral side of the right lower leg, mild right foot drop, and mentation intact  PSYCH: mentation appears normal, affect normal/bright    The longitudinal plan of care for the diagnosis(es)/condition(s) as documented were addressed during this visit. " Due to the added complexity in care, I will continue to support Divina in the subsequent management and with ongoing continuity of care.         Signed Electronically by: VERONIQUE Spears CNP

## 2024-06-14 ENCOUNTER — THERAPY VISIT (OUTPATIENT)
Dept: PHYSICAL THERAPY | Facility: CLINIC | Age: 39
End: 2024-06-14
Attending: FAMILY MEDICINE
Payer: COMMERCIAL

## 2024-06-14 DIAGNOSIS — M87.073: ICD-10-CM

## 2024-06-14 DIAGNOSIS — M25.572 ACUTE LEFT ANKLE PAIN: ICD-10-CM

## 2024-06-14 DIAGNOSIS — M76.72 PERONEAL TENDINITIS OF LEFT LOWER EXTREMITY: ICD-10-CM

## 2024-06-14 PROCEDURE — 97161 PT EVAL LOW COMPLEX 20 MIN: CPT | Mod: GP | Performed by: PHYSICAL THERAPIST

## 2024-06-14 PROCEDURE — 97110 THERAPEUTIC EXERCISES: CPT | Mod: GP | Performed by: PHYSICAL THERAPIST

## 2024-06-14 ASSESSMENT — ACTIVITIES OF DAILY LIVING (ADL)
GETTING_INTO_AND_OUT_OF_A_BATH: MODERATE DIFFICULTY
GOING_UP_OR_DOWN_10_STAIRS: EXTREME DIFFICULTY OR UNABLE TO PERFORM ACTIVITY
LEFS_SCORE(%): 0
PERFORMING_LIGHT_ACTIVITIES_AROUND_YOUR_HOME: MODERATE DIFFICULTY
PLEASE_INDICATE_YOR_PRIMARY_REASON_FOR_REFERRAL_TO_THERAPY:: FOOT AND/OR ANKLE
STANDING_FOR_1_HOUR: EXTREME DIFFICULTY OR UNABLE TO PERFORM ACTIVITY
LIFTING_AN_OBJECT,_LIKE_A_BAG_OF_GROCERIES_FROM_THE_FLOOR: MODERATE DIFFICULTY
RUNNING_ON_UNEVEN_GROUND: EXTREME DIFFICULTY OR UNABLE TO PERFORM ACTIVITY
WALKING_BETWEEN_ROOMS: NO DIFFICULTY
MAKING_SHARP_TURNS_WHILE_RUNNING_FAST: EXTREME DIFFICULTY OR UNABLE TO PERFORM ACTIVITY
PUTTING_ON_YOUR_SHOES_OR_SOCKS: MODERATE DIFFICULTY
YOUR_USUAL_HOBBIES,_RECREATIONAL_OR_SPORTING_ACTIVITIES: QUITE A BIT OF DIFFICULTY
WALKING_A_MILE: EXTREME DIFFICULTY OR UNABLE TO PERFORM ACTIVITY
LEFS_RAW_SCORE: 0
SHOPPING: EXTREME DIFFICULTY OR UNABLE TO PERFORM ACTIVITY
WALKING_2_BLOCKS: EXTREME DIFFICULTY OR UNABLE TO PERFORM ACTIVITY
GETTING_INTO_OR_OUT_OF_A_CAR: MODERATE DIFFICULTY
SQUATTING: QUITE A BIT OF DIFFICULTY
ROLLING_OVER_IN_BED: MODERATE DIFFICULTY
PERFORMING_HEAVY_ACTIVITIES_AROUND_YOUR_HOME: MODERATE DIFFICULTY
ANY_OF_YOUR_USUAL_WORK,_HOUSEWORK_OR_SCHOOL_ACTIVITIES: QUITE A BIT OF DIFFICULTY
SITTING_FOR_1_HOUR: NO DIFFICULTY
RUNNING_ON_EVEN_GROUND: EXTREME DIFFICULTY OR UNABLE TO PERFORM ACTIVITY

## 2024-06-14 NOTE — PROGRESS NOTES
PHYSICAL THERAPY EVALUATION  Type of Visit: Evaluation    See electronic medical record for Abuse and Falls Screening details.    Subjective       Presenting condition or subjective complaint: torn tendant in left foot and ankle   Pt presents to PT with primary complaint of acute left ankle pain. Started to have lateral ankle pain about a month ago. Maybe turned her ankle in a bathroom but didn't think anything of it. Started to worsen the following day and failed to resolve on it's own. [place] a lot [place] [employer] and had worsened symptoms that led her to seeking medical support. Pain has progressed to the side and the top of her outer mid-foot. Describes as constant, deep>superficial. Throbbing, burning.  4/10 - 10/10 Is tolerable in AM, worsens as the day goes on. Denies N/T, Denies toe or medial foot/ankle pain. . She notes new sense of ankle weakness with standing on her left side without the boot. She has a low CAM foot boot that has been helpful in managing pain. Aggs: stepping wrong, walking, standing, AROM eversion/inversion, minimal pain with DF/PF. Eases: oxycodone (1/day). Broke her left femur in January. Felt like she had fully recovered from this injury, restoration of strength and function. Has a trip planned to [place] to see her sister. Has a guardian and would like to know if there are any concerns regarding this trip July 1.   Date of onset: 06/05/24    Relevant medical history:   left femur fracture   Dates & types of surgery: tonsels out at 15yrs,breast reduction at 22 yrs,large tumor taken out at 29yrs,carpol tunnel surgery at 38yrs,steel ulysses put in at 38yrs   ORIF left femur fracture 1/2024; TCU stay with outpatient PT evaluation 2024    Prior diagnostic imaging/testing results: MRI; CT scan; X-ray     XR ANKLE LEFT G/E 3 VIEWS   5/28/2024 2:29 PM   HISTORY: Acute left ankle pain                                                                IMPRESSION:   1.  There is sclerosis within the  talar dome suggesting avascular  necrosis. No articular surface collapse or tibiotalar joint space  narrowing is identified. MRI could better evaluate as clinically  indicated.  2.  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 Quit that job per MD recommendation with current injury.   Hobbies/Interests:   spending time with family    Patient goals for therapy: things around the house,and other activicties    PAIN ASSESSMENT:   P1:  Location: Posterolateral Lateral malleolus, distal 1/2 peroneal mm, peroneal tendon to distal attachment  Description (ache, burn, throb, stab, shoot, etc): throb, burn  Pain Rating (Current, Best, Worst): 4-10/10  Frequency: constant  Aggs:  weightbearing, active ROM eversion, dorsiflexion  Eases: wearing boot, NWB, oxycodone x1/daily      LUMBAR SPINE EVALUATION  OBSERVATION/GAIT:  ankle boot donned, WBAT, no instability or fall concerns  NEURO SCREEN:  WNL  FUNCTIONAL ASSESSMENT: not formally tested    ROM:     Knee: WNL    Ankle: significantly limited and painful all directions in NWB, DF/PF WNL with heel on floor. Inversion<eversion moderately limited 2/2 pain  STRENGTH (MMT):     Ankle: limited isometric 2/2 pain. Most painful/limited --> eversion, DF with reproduction of primary complaint    JOINT MOBILITY/PAIN PROVOCATION (CPA/UPA):    Ankle:      Talocrural: NT 2/2 irritability of palpation      Subtalar: NT 2/2 irritability      Distal Tib-fib:  WNL    Foot:       Midfoot:  WNL      Forefoot: WNL      1st Ray:  WNL    FLEXIBILITY:  left gastroc/soleus, foot intrinsics, great toe flexor    PALPATION:  significantly TTP and reproductive of primary complaint at left peroneal  tendon to distal 1/2 peroneal mm, and peroneal tendon distal insertion      Assessment & Plan   CLINICAL IMPRESSIONS  Medical Diagnosis: Peroneal tendinitis of left lower extremity ; Left Ankle Pain    Treatment Diagnosis: Left Ankle Pain   Impression/Assessment: Pt is a 39 female presenting with signs and symptoms consistent with subacute left peroneal tendinitis with associated impairments including limited and painful AROM>PROM, pain with WB, TTP of peroneal tendon and local muscles contributing to difficulty with ADLs, leisure and work activities. Pt would benefit from graded exercise progression, neuromuscular re-education for stability with transition out of boot, assistance with pain management techniques,  and manual therapy for soft tissue dysfunction treatment to address pain and impairments. Skilled PT is indicated to address impairments, achieve patient goals and restore patient to baseline function.     Discussed with patient about recommendations and considerations for trip to [place]. At this time, this provider has no concerns regarding her going on her trip related to current injury. Pt will continue to use boot for protection and pain management, consider compression stockings for bilateral swelling management and elevating her feet as needed throughout the day to aid in swelling reduction with goal of reducing symptom irritability. Pt verbalizes understanding. Handwritten note given to patient to provide her guardian. May followup with clinic to verify information.     Clinical Decision Making (Complexity):  Clinical Presentation: Stable/Uncomplicated  Clinical Presentation Rationale: based on medical and personal factors listed in PT evaluation  Clinical Decision Making (Complexity): Low complexity    PLAN OF CARE  Treatment Interventions:  Modalities: Cryotherapy, E-stim, Hot Pack, Iontophoresis, Ultrasound  Interventions: Gait Training, Manual Therapy, Neuromuscular Re-education, Therapeutic  Activity, Therapeutic Exercise, Self-Care/Home Management    Long Term Goals     PT Goal 1  Goal Identifier: 1  Goal Description: Pt will report <6/10 by end of day with normal ADLs  Target Date: 07/12/24  PT Goal 2  Goal Identifier: 2  Goal Description: Pt will perform NWB ankle AROM with <4/10 pain.  Target Date: 07/12/24  PT Goal 3  Goal Identifier: 3  Goal Description: Pt will be able to ambulate without use of boot with <4/10 pain  Target Date: 09/06/24  PT Goal 4  Goal Identifier: 4  Goal Description: Pt will be able to return to part time work with <4/10 pain by end of shift  Target Date: 09/06/24      Frequency of Treatment: 1-2x/week  Duration of Treatment: 12 weeks    Recommended Referrals to Other Professionals:  none  Education Assessment:   Learner/Method: Patient;Listening;Reading;Demonstration;Pictures/Video;No Barriers to Learning    Risks and benefits of evaluation/treatment have been explained.   Patient/Family/caregiver agrees with Plan of Care.     Evaluation Time:     PT Eval, Low Complexity Minutes (61479): 35    Signing Clinician: Sheila Crowley PT        [facility]                                                                                   OUTPATIENT PHYSICAL THERAPY      PLAN OF TREATMENT FOR OUTPATIENT REHABILITATION   Patient's Last Name, First Name, Divina Vaughan YOB: 1985   Provider's Name   [facility]   Medical Record No.  [id number]     Onset Date: 06/05/24  Start of Care Date: 06/14/24     Medical Diagnosis:  Peroneal tendinitis of left lower extremity ; Left Ankle Pain      PT Treatment Diagnosis:  Left Ankle Pain Plan of Treatment  Frequency/Duration: 1-2x/week/ 12 weeks    Certification date from 06/14/24 to 08/09/24         See note for plan of treatment details and functional goals     Sheila Crowley PT                         Referring Provider:  Anupam Escalante  Assessment  See Epic Evaluation- Start of Care Date: 06/14/24

## 2024-06-18 ENCOUNTER — MEDICAL CORRESPONDENCE (OUTPATIENT)
Dept: HEALTH INFORMATION MANAGEMENT | Facility: CLINIC | Age: 39
End: 2024-06-18

## 2024-06-19 ENCOUNTER — MYC MEDICAL ADVICE (OUTPATIENT)
Dept: NEPHROLOGY | Facility: CLINIC | Age: 39
End: 2024-06-19
Payer: COMMERCIAL

## 2024-06-19 ENCOUNTER — TELEPHONE (OUTPATIENT)
Dept: ENDOCRINOLOGY | Facility: CLINIC | Age: 39
End: 2024-06-19
Payer: COMMERCIAL

## 2024-06-19 NOTE — TELEPHONE ENCOUNTER
[employer] called and they need the ICD-10 code on the order please, will refax to you to send back or if you still have the order please fax back to them at: [phone].

## 2024-06-19 NOTE — TELEPHONE ENCOUNTER
Health Call Center    Phone Message    May a detailed message be left on voicemail: yes     Reason for Call: Form or Letter   Type or form/letter needing completion:   Physician Written Order        Provider: Shital Roberts NP   Date form needed: asap  Once completed: Fax form to: [phone]     Action Taken: Message routed to:  Other: [facility]    Travel Screening: Not Applicable     Anthony was calling about the physician written order faxed to us yesterday and received it back completed same day. However, the document is missing the diagnosis code, please review document and add Dx coed and refax to [phone] or call to address any questions or concerns thank you.

## 2024-06-20 ENCOUNTER — LAB (OUTPATIENT)
Dept: LAB | Facility: CLINIC | Age: 39
End: 2024-06-20
Payer: COMMERCIAL

## 2024-06-20 ENCOUNTER — THERAPY VISIT (OUTPATIENT)
Dept: PHYSICAL THERAPY | Facility: CLINIC | Age: 39
End: 2024-06-20
Attending: FAMILY MEDICINE
Payer: COMMERCIAL

## 2024-06-20 DIAGNOSIS — M25.572 ACUTE LEFT ANKLE PAIN: ICD-10-CM

## 2024-06-20 DIAGNOSIS — F25.0 SCHIZOAFFECTIVE DISORDER, BIPOLAR TYPE (H): ICD-10-CM

## 2024-06-20 DIAGNOSIS — M76.72 PERONEAL TENDINITIS OF LEFT LOWER EXTREMITY: Primary | ICD-10-CM

## 2024-06-20 LAB
BASOPHILS # BLD AUTO: 0.1 10E3/UL (ref 0–0.2)
BASOPHILS NFR BLD AUTO: 1 %
EOSINOPHIL # BLD AUTO: 0.4 10E3/UL (ref 0–0.7)
EOSINOPHIL NFR BLD AUTO: 2 %
ERYTHROCYTE [DISTWIDTH] IN BLOOD BY AUTOMATED COUNT: 16.9 % (ref 10–15)
HCT VFR BLD AUTO: 36 % (ref 35–47)
HGB BLD-MCNC: 11 G/DL (ref 11.7–15.7)
IMM GRANULOCYTES # BLD: 0.1 10E3/UL
IMM GRANULOCYTES NFR BLD: 1 %
LYMPHOCYTES # BLD AUTO: 4.7 10E3/UL (ref 0.8–5.3)
LYMPHOCYTES NFR BLD AUTO: 29 %
MCH RBC QN AUTO: 27.3 PG (ref 26.5–33)
MCHC RBC AUTO-ENTMCNC: 30.6 G/DL (ref 31.5–36.5)
MCV RBC AUTO: 89 FL (ref 78–100)
MONOCYTES # BLD AUTO: 0.8 10E3/UL (ref 0–1.3)
MONOCYTES NFR BLD AUTO: 5 %
NEUTROPHILS # BLD AUTO: 10.1 10E3/UL (ref 1.6–8.3)
NEUTROPHILS NFR BLD AUTO: 63 %
PLATELET # BLD AUTO: 440 10E3/UL (ref 150–450)
RBC # BLD AUTO: 4.03 10E6/UL (ref 3.8–5.2)
WBC # BLD AUTO: 16.1 10E3/UL (ref 4–11)

## 2024-06-20 PROCEDURE — 36415 COLL VENOUS BLD VENIPUNCTURE: CPT

## 2024-06-20 PROCEDURE — 85025 COMPLETE CBC W/AUTO DIFF WBC: CPT

## 2024-06-20 PROCEDURE — 97110 THERAPEUTIC EXERCISES: CPT | Mod: GP

## 2024-06-22 ENCOUNTER — MYC MEDICAL ADVICE (OUTPATIENT)
Dept: FAMILY MEDICINE | Facility: CLINIC | Age: 39
End: 2024-06-22
Payer: COMMERCIAL

## 2024-06-22 DIAGNOSIS — N18.31 TYPE 2 DIABETES MELLITUS WITH STAGE 3A CHRONIC KIDNEY DISEASE, WITH LONG-TERM CURRENT USE OF INSULIN (H): Primary | ICD-10-CM

## 2024-06-22 DIAGNOSIS — Z79.4 TYPE 2 DIABETES MELLITUS WITH STAGE 3A CHRONIC KIDNEY DISEASE, WITH LONG-TERM CURRENT USE OF INSULIN (H): Primary | ICD-10-CM

## 2024-06-22 DIAGNOSIS — E11.22 TYPE 2 DIABETES MELLITUS WITH STAGE 3A CHRONIC KIDNEY DISEASE, WITH LONG-TERM CURRENT USE OF INSULIN (H): Primary | ICD-10-CM

## 2024-06-24 ENCOUNTER — TELEPHONE (OUTPATIENT)
Dept: FAMILY MEDICINE | Facility: CLINIC | Age: 39
End: 2024-06-24

## 2024-06-24 DIAGNOSIS — N18.31 TYPE 2 DIABETES MELLITUS WITH STAGE 3A CHRONIC KIDNEY DISEASE, WITH LONG-TERM CURRENT USE OF INSULIN (H): ICD-10-CM

## 2024-06-24 DIAGNOSIS — Z79.4 TYPE 2 DIABETES MELLITUS WITH STAGE 3A CHRONIC KIDNEY DISEASE, WITH LONG-TERM CURRENT USE OF INSULIN (H): ICD-10-CM

## 2024-06-24 DIAGNOSIS — K21.9 GASTROESOPHAGEAL REFLUX DISEASE WITHOUT ESOPHAGITIS: ICD-10-CM

## 2024-06-24 DIAGNOSIS — E11.22 TYPE 2 DIABETES MELLITUS WITH STAGE 3A CHRONIC KIDNEY DISEASE, WITH LONG-TERM CURRENT USE OF INSULIN (H): ICD-10-CM

## 2024-06-24 RX ORDER — INSULIN ASPART 100 [IU]/ML
INJECTION, SOLUTION INTRAVENOUS; SUBCUTANEOUS
Qty: 10 ML | Refills: 1 | Status: SHIPPED | OUTPATIENT
Start: 2024-06-24 | End: 2024-06-24

## 2024-06-24 NOTE — TELEPHONE ENCOUNTER
Patient's call transferred to author  Patient needing her insulin refilled as soon as possible as she is leaving out of town on Saturday     Patient needing her insulin refilled for her insulin pump    PCP is not in Clinic today - forwarding to Nurse Vincent to forward to covering provider    Arnaldo March, Clinic RN  [facility]

## 2024-06-24 NOTE — TELEPHONE ENCOUNTER
Prescription approved per [facility] Refill Protocol.    Pending Prescriptions:                       Disp   Refills    omeprazole (PRILOSEC) 20 MG DR capsule [P*30 cap*2            Sig: TAKE 1 CAPSULE BY MOUTH EVERY DAY      Requested Prescriptions   Pending Prescriptions Disp Refills    omeprazole (PRILOSEC) 20 MG DR capsule [Pharmacy Med Name: omeprazole 20 mg capsule,delayed release] 30 capsule 2     Sig: TAKE 1 CAPSULE BY MOUTH EVERY DAY       PPI Protocol Passed - 6/24/2024  8:02 AM        Passed - Medication is active on med list        Passed - Medication indicated for associated diagnosis     The medication is prescribed for one or more of the following conditions:     Erosive esophagitis    Gastritis   Gastric hypersecretion   Gastric ulcer   Gastroesophageal reflux disease   Helicobacter pylori gastrointestinal tract infection   Ulcer of duodenum   Drug-induced peptic ulcer   Esophageal stricture   Gastrointestinal hemorrhage   Indigestion   Stress ulcer   Zollinger-Cano syndrome   Wyatt s esophagus   Laryngopharyngeal reflux          Passed - Recent (12 mo) or future (90 days) visit within the authorizing provider's specialty     The patient must have completed an in-person or virtual visit within the past 12 months or has a future visit scheduled within the next 90 days with the authorizing provider s specialty.  Urgent care and e-visits do not quality as an office visit for this protocol.          Passed - Patient is age 18 or older        Passed - No active pregnacy on record        Passed - No positive pregnancy test in past 12 months           Danelle Swann RN on 6/24/2024 at 2:42 PM

## 2024-06-24 NOTE — TELEPHONE ENCOUNTER
Please add max units per day on insulin Aspart (novolog vial) right now it reads as needed for pump but pharmacy is requesting a max units/day be added.    Repended medication for provider to add max/day.    Thank you,    Marianela LOPEZ RN, BSN

## 2024-06-25 DIAGNOSIS — Z79.4 TYPE 2 DIABETES MELLITUS WITH STAGE 3A CHRONIC KIDNEY DISEASE, WITH LONG-TERM CURRENT USE OF INSULIN (H): ICD-10-CM

## 2024-06-25 DIAGNOSIS — N18.31 TYPE 2 DIABETES MELLITUS WITH STAGE 3A CHRONIC KIDNEY DISEASE, WITH LONG-TERM CURRENT USE OF INSULIN (H): ICD-10-CM

## 2024-06-25 DIAGNOSIS — E11.22 TYPE 2 DIABETES MELLITUS WITH STAGE 3A CHRONIC KIDNEY DISEASE, WITH LONG-TERM CURRENT USE OF INSULIN (H): ICD-10-CM

## 2024-06-25 RX ORDER — INSULIN ASPART 100 [IU]/ML
INJECTION, SOLUTION INTRAVENOUS; SUBCUTANEOUS
Qty: 10 ML | Refills: 1 | Status: CANCELLED | OUTPATIENT
Start: 2024-06-25

## 2024-06-25 RX ORDER — INSULIN ASPART 100 [IU]/ML
INJECTION, SOLUTION INTRAVENOUS; SUBCUTANEOUS
Qty: 10 ML | Refills: 1 | Status: SHIPPED | OUTPATIENT
Start: 2024-06-25 | End: 2024-06-25

## 2024-06-25 NOTE — TELEPHONE ENCOUNTER
Pt is calling and reports pharmacy cannot fill her insulin. They need to know how much she uses. Please clarify with pharmacy  Yamilka Butt RN on 6/25/2024 at 9:19 AM

## 2024-06-25 NOTE — TELEPHONE ENCOUNTER
Medication Question or Refill    Contacts       Contact Date/Time Type Contact Phone/Fax    06/25/2024 12:49 PM CDT Phone (Incoming) Divina Delgadillo (Self) [phone] (M)            What medication are you calling about (include dose and sig)?: Pending Prescriptions:                       Disp   Refills    insulin aspart (NOVOLOG VIAL) 100 UNITS/M*10 mL  1            Sig: As needed for pump: Max Units/day is 30 per day      Preferred Pharmacy:       [facility] [address] [address]  Phone: [phone] Fax: [phone]    Controlled Substance Agreement on file:   CSA -- Patient Level:    CSA: None found at the patient level.       Who prescribed the medication?: Dr. Velasquez    Do you need a refill? Yes    When did you use the medication last? This morning      Do you have any questions or concerns?  Yes: Patient used last insulin aspart this morning. She leaves town on Friday 6/28 until Sunday 7/7. Pharmacy received refill however they are telling her they cannot fill until Monday 7/1. Patient is needing refill as she is currently out.      Could we send this information to you in [employer] or would you prefer to receive a phone call?:   Patient would prefer a phone call   Okay to leave a detailed message?: Yes at Home number on file [phone] (home)

## 2024-06-25 NOTE — TELEPHONE ENCOUNTER
Forwarding request for new insulin order to PCP.  Per pharmacy, need more specific Rx.    RN reached out to patient to see how she uses her insulin pump, as the only Rx/order we have below is somewhat vague.     insulin aspart (NOVOLOG VIAL) 100 UNITS/ML vial As needed for pump: Max Units/day is 30 per day 10 mL 1 ordered 06/25/2024      Patient states depending on how many times she eats per day and what she eats for carbohydrates, she will bolus on sliding scale.  She changes infusion set as much as needed, usually every 3-4 days.   RN does see a past order, for pen, which is more specific.  See below.  Patient states this amount is pretty accurate for her.  Will pend another order for  PCP who is out today to review tomorrow morning.   Inject 1-14 Units Subcutaneous 3 times daily (before meals) Inject 3 times daily with meals as instructed. Max TDD 30. 30 mL 4 ordered 12/05/2023 05/07/2024     Flagging as high priority, as patient needs this by Thursday, as she's going out of town on Friday.   She would like a call once Rx is sent.       Mer Arthur RN  [facility]

## 2024-06-26 RX ORDER — INSULIN ASPART 100 [IU]/ML
INJECTION, SOLUTION INTRAVENOUS; SUBCUTANEOUS
Qty: 150 ML | Refills: 1 | Status: SHIPPED | OUTPATIENT
Start: 2024-06-26

## 2024-06-26 RX ORDER — INSULIN ASPART 100 [IU]/ML
INJECTION, SOLUTION INTRAVENOUS; SUBCUTANEOUS
Qty: 150 ML | Refills: 1 | Status: SHIPPED | OUTPATIENT
Start: 2024-06-26 | End: 2024-06-26

## 2024-06-26 NOTE — TELEPHONE ENCOUNTER
Patient's call transferred to author  Patient states her insurance will not cover a refill of her insulin for her insulin pump until Monday     Patient states she is out of insulin and is going out of town on Friday  Patient needing to be able to refill insulin ASAP      Call placed to pharmacy  Pharmacy states the only way insurance will cover a sooner refill is if the max dosing per day is increased  Forwarding to provider and care team to ensure this is completed today so patient can  her insulin    Arnaldo March, Clinic RN  [facility]

## 2024-06-27 ENCOUNTER — THERAPY VISIT (OUTPATIENT)
Dept: PHYSICAL THERAPY | Facility: CLINIC | Age: 39
End: 2024-06-27
Attending: FAMILY MEDICINE
Payer: COMMERCIAL

## 2024-06-27 ENCOUNTER — HOSPITAL ENCOUNTER (OUTPATIENT)
Dept: CT IMAGING | Facility: CLINIC | Age: 39
Discharge: HOME | End: 2024-06-27
Attending: NURSE PRACTITIONER | Admitting: NURSE PRACTITIONER
Payer: COMMERCIAL

## 2024-06-27 DIAGNOSIS — G89.29 CHRONIC BILATERAL LOW BACK PAIN WITHOUT SCIATICA: ICD-10-CM

## 2024-06-27 DIAGNOSIS — M25.572 ACUTE LEFT ANKLE PAIN: ICD-10-CM

## 2024-06-27 DIAGNOSIS — R20.0 NUMBNESS OF RIGHT FOOT: ICD-10-CM

## 2024-06-27 DIAGNOSIS — M79.661 PAIN OF RIGHT LOWER LEG: ICD-10-CM

## 2024-06-27 DIAGNOSIS — M54.50 CHRONIC BILATERAL LOW BACK PAIN WITHOUT SCIATICA: ICD-10-CM

## 2024-06-27 DIAGNOSIS — M76.72 PERONEAL TENDINITIS OF LEFT LOWER EXTREMITY: Primary | ICD-10-CM

## 2024-06-27 DIAGNOSIS — R29.898 WEAKNESS OF FOOT, RIGHT: ICD-10-CM

## 2024-06-27 PROCEDURE — 72131 CT LUMBAR SPINE W/O DYE: CPT

## 2024-06-27 PROCEDURE — 97110 THERAPEUTIC EXERCISES: CPT | Mod: GP

## 2024-06-28 ENCOUNTER — TRANSFERRED RECORDS (OUTPATIENT)
Dept: HEALTH INFORMATION MANAGEMENT | Facility: CLINIC | Age: 39
End: 2024-06-28
Payer: COMMERCIAL

## 2024-06-28 DIAGNOSIS — R20.0 NUMBNESS OF RIGHT FOOT: ICD-10-CM

## 2024-06-28 DIAGNOSIS — G57.11 MERALGIA PARESTHETICA OF RIGHT SIDE: ICD-10-CM

## 2024-06-28 DIAGNOSIS — G89.29 CHRONIC BILATERAL LOW BACK PAIN WITHOUT SCIATICA: Primary | ICD-10-CM

## 2024-06-28 DIAGNOSIS — M54.50 CHRONIC BILATERAL LOW BACK PAIN WITHOUT SCIATICA: Primary | ICD-10-CM

## 2024-06-28 NOTE — TELEPHONE ENCOUNTER
Pt has gotten her medications    Debora Benjamin  [facility]

## 2024-07-01 DIAGNOSIS — M80.00XD OSTEOPOROSIS WITH CURRENT PATHOLOGICAL FRACTURE WITH ROUTINE HEALING, UNSPECIFIED OSTEOPOROSIS TYPE, SUBSEQUENT ENCOUNTER: ICD-10-CM

## 2024-07-01 RX ORDER — ALENDRONATE SODIUM 70 MG/1
70 TABLET ORAL
Qty: 12 TABLET | Refills: 0 | Status: SHIPPED | OUTPATIENT
Start: 2024-07-01 | End: 2024-10-03

## 2024-07-05 DIAGNOSIS — N17.9 AKI (ACUTE KIDNEY INJURY) (H): Primary | ICD-10-CM

## 2024-07-11 ENCOUNTER — THERAPY VISIT (OUTPATIENT)
Dept: PHYSICAL THERAPY | Facility: CLINIC | Age: 39
End: 2024-07-11
Attending: FAMILY MEDICINE
Payer: COMMERCIAL

## 2024-07-11 DIAGNOSIS — M25.572 ACUTE LEFT ANKLE PAIN: ICD-10-CM

## 2024-07-11 DIAGNOSIS — M76.72 PERONEAL TENDINITIS OF LEFT LOWER EXTREMITY: Primary | ICD-10-CM

## 2024-07-11 PROCEDURE — 97110 THERAPEUTIC EXERCISES: CPT | Mod: GP

## 2024-07-11 NOTE — PROGRESS NOTES
07/11/24 0500   Appointment Info   Signing clinician's name / credentials Rebecca Swann, PT   Visits Used 4   Medical Diagnosis Peroneal tendinitis of left lower extremity ; Left Ankle Pain   PT Tx Diagnosis Left Ankle Pain   Quick Adds Certification   Progress Note/Certification   Start of Care Date 06/14/24   Onset of illness/injury or Date of Surgery 06/05/24   Therapy Frequency 1-2x/week   Predicted Duration 12 weeks   Certification date from 06/14/24   Certification date to 08/09/24   Progress Note Due Date 09/06/24   GOALS   PT Goals 2;3;4   PT Goal 1   Goal Identifier 1   Goal Description Pt will report <6/10 by end of day with normal ADLs   Target Date 07/12/24   Date Met 06/27/24   PT Goal 2   Goal Identifier 2   Goal Description Pt will perform NWB ankle AROM with <4/10 pain.   Target Date 07/12/24   Date Met 06/27/24   PT Goal 3   Goal Identifier 3   Goal Description Pt will be able to ambulate without use of boot with <4/10 pain   Target Date 09/06/24   Date Met 07/11/24   PT Goal 4   Goal Identifier 4   Goal Description Pt will be able to return to part time work with <4/10 pain by end of shift   Goal Progress 7/11/24 Does not have a job currently   Target Date 09/06/24   Subjective Report   Subjective Report Pt reports her ankle is 2/10. She is ready to try the HEP independently.   Objective Measures   Objective Measures Objective Measure 1;Objective Measure 2;Objective Measure 3   Objective Measure 1   Objective Measure Palpation   Details mildly tender along L peroneal tendon just post to lat malleolus to 1 inch proximal to base of 5th MT   Objective Measure 2   Objective Measure L Ankle Strength   Details 5/5 except PF 2+/5 L, 3-/5 R   Objective Measure 3   Objective Measure L Ankle AROM   Details WNL   Treatment Interventions (PT)   Interventions Therapeutic Procedure/Exercise   Therapeutic Procedure/Exercise   Ther Proc 1 stair climbing 4 steps without pain, step ups x 10   PTRx Ther Proc 8 Toe  Raises   PTRx Ther Proc 8 - Details x 15B; instructed pt on progression to 20reps and then to SL.   Skilled Intervention VC and demonstration required for performance, technique and pain management recommendations   Patient Response/Progress pt tolerated well   Eval/Assessments   Assessments   (Pt presents w/ left peroneal tendinitis x1 mo with pain limited ankle AROM *eversion/DF, pain with weightbearing but better in boot. Introduced gentle AROM with heel on ground, tolerated fair. Will benefit from graded progression and pain mgmt.)   Education   Learner/Method Patient;Listening;Reading;Demonstration;Pictures/Video;No Barriers to Learning   Plan   Home program PTrx   Plan for next session D/C PT         DISCHARGE  Reason for Discharge: Patient has nearly met all goals.    Equipment Issued: Theraband    Discharge Plan: Patient to continue home program.    Referring Provider:  Anupam [place]

## 2024-07-17 ENCOUNTER — OFFICE VISIT (OUTPATIENT)
Dept: PHYSICAL MEDICINE AND REHAB | Facility: CLINIC | Age: 39
End: 2024-07-17
Attending: NURSE PRACTITIONER
Payer: COMMERCIAL

## 2024-07-17 DIAGNOSIS — G57.11 MERALGIA PARESTHETICA OF RIGHT SIDE: ICD-10-CM

## 2024-07-17 DIAGNOSIS — R20.0 NUMBNESS OF RIGHT FOOT: ICD-10-CM

## 2024-07-17 DIAGNOSIS — R29.898 WEAKNESS OF FOOT, RIGHT: ICD-10-CM

## 2024-07-17 DIAGNOSIS — R20.0 NUMBNESS OF RIGHT ANTERIOR THIGH: ICD-10-CM

## 2024-07-17 PROCEDURE — 95911 NRV CNDJ TEST 9-10 STUDIES: CPT | Performed by: PHYSICAL MEDICINE & REHABILITATION

## 2024-07-17 PROCEDURE — 95886 MUSC TEST DONE W/N TEST COMP: CPT | Performed by: PHYSICAL MEDICINE & REHABILITATION

## 2024-07-17 NOTE — PROGRESS NOTES
[facility]  [address] [place] [address] [address]  [place] [address]  Office: [phone] Fax: [phone]    Electromyography and Nerve Conduction Study Report        Indication: Patient presents at the request of Jaleesa Velasquez CNP for an EMG of the right lower extremity.  She has several month history of right lateral thigh paresthesias over the lateral femoral cutaneous distribution.  She does have low back pain and history of neuropathy with diabetes mellitus history.  On exam, she has decree sensation to light touch right lateral femoral cutaneous distribution and first dorsal webspace of the right foot greater than left foot.  She is 1+ patellar 0 Achilles reflexes bilaterally with equivocal toes.  Normal muscle strength throughout the major muscle groups of the bilateral lower extremities.      Pt Exam Discussion (Communication Barriers):  Electromyography and nerve conduction testing, including associated discomfort, risks, benefits, and alternatives was discussed with the patient prior to the procedure.  No learning/ communication barriers; patient verbalized understanding of procedure.  Informed consent was obtained.           Pt Assessment:  Testing was successfully completed; patient tolerated testing well.       Blood Thinners: None Skin Temperature: Warmed 33.2                   EMG/NCS  results:   Nerve Conduction Studies  Motor Sites      Segment Distal Latency Neg. Amp CV F-Latency F-Estimate Comment   Site  (ms) (mV) (m/s) (ms) (ms)    Left Fibular (EDB) Motor   Ankle Ankle-EDB 4.0 0.83       Knee Knee-Ankle 13.2 *0.68 43      Right Fibular (EDB) Motor   Ankle Ankle-EDB *NR *NR       Knee Knee-Ankle *NR *NR *NR      Right Fibular (TA) Motor   Fib Head Fib Head-Tib anterior 2.5 2.7       Knee Knee-Fib Head 4.5 *2.6 50      Right Tibial (AH) Motor   Ankle Ankle-AH 2.8 6.1  56.0 -    Knee Knee-Ankle 9.5 4.3 59        Sensory Sites      Onset Lat Peak Lat Amp CV Comment    Site (ms) (ms) ( V) (m/s)    Left Sural Sensory   B-Ankle *NR *NR *NR *NR    Right Sural Sensory   B-Ankle *NR *NR *NR *NR      H-Reflex Sites      M-Lat H Lat H Neg Amp   Site (ms) (ms) (mV)   Left Tibial (Soleus) H-Reflex   Pop Fossa 6.0 - -   Right Tibial (Soleus) H-Reflex   Pop Fossa 6.0 - -     H-Reflex Sites      Lt. H Lat Rt. H Lat L-R H Lat L-R H Neg Amp   Site (ms) (ms) (ms) Norm (mV)   Tibial (Soleus) H-Reflex   Pop Fossa - - -  < 3.0 -       NCS Waveforms:    Motor                Sensory         F-Wave       H-Reflex           Electromyography     Side Muscle Nerve Root Ins Act Fibs Psw Fasc Recrt Dur Amp Poly Comment   Right AntTibialis Dp Br Fibular L4-5 Nml Nml Nml Nml Nml Nml Nml 0    Right Gastroc Tibial S1-2 Nml Nml Nml Nml Nml Nml Nml 0    Right Fibularis Long Sup Br Fibular L5-S1 Nml Nml Nml Nml Nml Nml Nml 0    Right VastusLat Femoral L2-4 Nml Nml Nml Nml Nml Nml Nml 0    Right RectFemoris Femoral L2-4 Nml Nml Nml Nml Nml Nml Nml 0          Comment NCS: Abnormal study  1.  Absent bilateral sural SNAPs.  2.  Absent right peroneal CMAP to the EDB.  Low amplitude left peroneal CMAP to the EDB.  3.  Normal right peroneal CMAP to the tibialis anterior.  4.  Normal right tibial CMAP.    Comment EMG: Normal study  1.  Normal needle EMG right lower extremity.    Interpretation: Abnormal study: There is electrodiagnostic evidence of:    1.  Electrodiagnostic findings are consistent with but not confirmatory for a sensorimotor peripheral polyneuropathy.  This would be consistent with her history of diabetes mellitus.      2.  There is no electrodiagnostic evidence of lumbosacral radiculopathy, lumbosacral plexopathy, or focal neuropathy in the right lower extremity.    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  It should be noted that electrodiagnostic testing is useful to exclude other causes of lateral thigh paresthesias such as lumbar radiculopathy, but  often not confirmatory for a lateral femoral cutaneous neuropathy.  This should be considered a clinical diagnosis.    The testing was completed in its entirety by the physician.      It was our pleasure caring for your patient today, if there any questions or concerns please do not hesitate to contact us.

## 2024-07-17 NOTE — LETTER
7/17/2024      Divina Delgadillo  [address] [address]  [place] [facility] [address]      Dear Colleague,    Thank you for referring your patient, Divina Delgadillo, to the [facility]. Please see a copy of my visit note below.    [facility]  [address] [place] [address] [address]  [place] [address]  Office: [phone] Fax: [phone]    Electromyography and Nerve Conduction Study Report        Indication: Patient presents at the request of Jaleesa Velasquez CNP for an EMG of the right lower extremity.  She has several month history of right lateral thigh paresthesias over the lateral femoral cutaneous distribution.  She does have low back pain and history of neuropathy with diabetes mellitus history.  On exam, she has decree sensation to light touch right lateral femoral cutaneous distribution and first dorsal webspace of the right foot greater than left foot.  She is 1+ patellar 0 Achilles reflexes bilaterally with equivocal toes.  Normal muscle strength throughout the major muscle groups of the bilateral lower extremities.      Pt Exam Discussion (Communication Barriers):  Electromyography and nerve conduction testing, including associated discomfort, risks, benefits, and alternatives was discussed with the patient prior to the procedure.  No learning/ communication barriers; patient verbalized understanding of procedure.  Informed consent was obtained.           Pt Assessment:  Testing was successfully completed; patient tolerated testing well.       Blood Thinners: None Skin Temperature: Warmed 33.2                   EMG/NCS  results:   Nerve Conduction Studies  Motor Sites      Segment Distal Latency Neg. Amp CV F-Latency F-Estimate Comment   Site  (ms) (mV) (m/s) (ms) (ms)    Left Fibular (EDB) Motor   Ankle Ankle-EDB 4.0 0.83       Knee Knee-Ankle 13.2 *0.68 43      Right Fibular (EDB) Motor   Ankle Ankle-EDB *NR *NR       Knee Knee-Ankle *NR *NR *NR      Right  Fibular (TA) Motor   Fib Head Fib Head-Tib anterior 2.5 2.7       Knee Knee-Fib Head 4.5 *2.6 50      Right Tibial (AH) Motor   Ankle Ankle-AH 2.8 6.1  56.0 -    Knee Knee-Ankle 9.5 4.3 59        Sensory Sites      Onset Lat Peak Lat Amp CV Comment   Site (ms) (ms) ( V) (m/s)    Left Sural Sensory   B-Ankle *NR *NR *NR *NR    Right Sural Sensory   B-Ankle *NR *NR *NR *NR      H-Reflex Sites      M-Lat H Lat H Neg Amp   Site (ms) (ms) (mV)   Left Tibial (Soleus) H-Reflex   Pop Fossa 6.0 - -   Right Tibial (Soleus) H-Reflex   Pop Fossa 6.0 - -     H-Reflex Sites      Lt. H Lat Rt. H Lat L-R H Lat L-R H Neg Amp   Site (ms) (ms) (ms) Norm (mV)   Tibial (Soleus) H-Reflex   Pop Fossa - - -  < 3.0 -       NCS Waveforms:    Motor                Sensory         F-Wave       H-Reflex           Electromyography     Side Muscle Nerve Root Ins Act Fibs Psw Fasc Recrt Dur Amp Poly Comment   Right AntTibialis Dp Br Fibular L4-5 Nml Nml Nml Nml Nml Nml Nml 0    Right Gastroc Tibial S1-2 Nml Nml Nml Nml Nml Nml Nml 0    Right Fibularis Long Sup Br Fibular L5-S1 Nml Nml Nml Nml Nml Nml Nml 0    Right VastusLat Femoral L2-4 Nml Nml Nml Nml Nml Nml Nml 0    Right RectFemoris Femoral L2-4 Nml Nml Nml Nml Nml Nml Nml 0          Comment NCS: Abnormal study  1.  Absent bilateral sural SNAPs.  2.  Absent right peroneal CMAP to the EDB.  Low amplitude left peroneal CMAP to the EDB.  3.  Normal right peroneal CMAP to the tibialis anterior.  4.  Normal right tibial CMAP.    Comment EMG: Normal study  1.  Normal needle EMG right lower extremity.    Interpretation: Abnormal study: There is electrodiagnostic evidence of:    1.  Electrodiagnostic findings are consistent with but not confirmatory for a sensorimotor peripheral polyneuropathy.  This would be consistent with her history of diabetes mellitus.      2.  There is no electrodiagnostic evidence of lumbosacral radiculopathy, lumbosacral plexopathy, or focal neuropathy in the right lower  extremity.    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  It should be noted that electrodiagnostic testing is useful to exclude other causes of lateral thigh paresthesias such as lumbar radiculopathy, but often not confirmatory for a lateral femoral cutaneous neuropathy.  This should be considered a clinical diagnosis.    The testing was completed in its entirety by the physician.      It was our pleasure caring for your patient today, if there any questions or concerns please do not hesitate to contact us.      Again, thank you for allowing me to participate in the care of your patient.        Sincerely,        Wong Wheat, DO

## 2024-07-17 NOTE — PATIENT INSTRUCTIONS
Thank you for choosing the [facility] for your EMG testing.    The ordering provider will receive your final EMG results within the next few days.  Please follow up with your provider for the results and further treatment recommendations.

## 2024-07-25 ENCOUNTER — HOSPITAL ENCOUNTER (EMERGENCY)
Facility: CLINIC | Age: 39
Discharge: HOME | End: 2024-07-26
Attending: EMERGENCY MEDICINE | Admitting: EMERGENCY MEDICINE
Payer: COMMERCIAL

## 2024-07-25 VITALS
DIASTOLIC BLOOD PRESSURE: 60 MMHG | WEIGHT: 225 LBS | TEMPERATURE: 98.1 F | RESPIRATION RATE: 18 BRPM | BODY MASS INDEX: 37.16 KG/M2 | SYSTOLIC BLOOD PRESSURE: 170 MMHG | HEART RATE: 70 BPM | OXYGEN SATURATION: 94 %

## 2024-07-25 DIAGNOSIS — K60.2 ANAL FISSURE: ICD-10-CM

## 2024-07-25 LAB
ALBUMIN UR-MCNC: NEGATIVE MG/DL
ANION GAP SERPL CALCULATED.3IONS-SCNC: 12 MMOL/L (ref 7–15)
APPEARANCE UR: CLEAR
APTT PPP: 27 SECONDS (ref 22–38)
BACTERIA #/AREA URNS HPF: ABNORMAL /HPF
BILIRUB UR QL STRIP: NEGATIVE
BUN SERPL-MCNC: 32.2 MG/DL (ref 6–20)
CALCIUM SERPL-MCNC: 8.9 MG/DL (ref 8.8–10.4)
CHLORIDE SERPL-SCNC: 108 MMOL/L (ref 98–107)
COLOR UR AUTO: YELLOW
CREAT SERPL-MCNC: 1.68 MG/DL (ref 0.51–0.95)
EGFRCR SERPLBLD CKD-EPI 2021: 39 ML/MIN/1.73M2
GLUCOSE SERPL-MCNC: 163 MG/DL (ref 70–99)
GLUCOSE UR STRIP-MCNC: NEGATIVE MG/DL
HCO3 SERPL-SCNC: 23 MMOL/L (ref 22–29)
HGB UR QL STRIP: NEGATIVE
INR PPP: 0.93 (ref 0.85–1.15)
KETONES UR STRIP-MCNC: NEGATIVE MG/DL
LEUKOCYTE ESTERASE UR QL STRIP: NEGATIVE
NITRATE UR QL: NEGATIVE
PH UR STRIP: 6 [PH] (ref 5–7)
PLAT MORPH BLD: NORMAL
POTASSIUM SERPL-SCNC: 4.7 MMOL/L (ref 3.4–5.3)
RBC MORPH BLD: NORMAL
RBC URINE: <1 /HPF
SODIUM SERPL-SCNC: 143 MMOL/L (ref 135–145)
SP GR UR STRIP: 1.01 (ref 1–1.03)
SQUAMOUS EPITHELIAL: 3 /HPF
UROBILINOGEN UR STRIP-MCNC: NORMAL MG/DL
WBC URINE: 2 /HPF

## 2024-07-25 PROCEDURE — 99284 EMERGENCY DEPT VISIT MOD MDM: CPT | Performed by: EMERGENCY MEDICINE

## 2024-07-25 PROCEDURE — 80048 BASIC METABOLIC PNL TOTAL CA: CPT | Performed by: EMERGENCY MEDICINE

## 2024-07-25 PROCEDURE — 85610 PROTHROMBIN TIME: CPT | Performed by: EMERGENCY MEDICINE

## 2024-07-25 PROCEDURE — 99283 EMERGENCY DEPT VISIT LOW MDM: CPT | Performed by: EMERGENCY MEDICINE

## 2024-07-25 PROCEDURE — 85007 BL SMEAR W/DIFF WBC COUNT: CPT | Performed by: EMERGENCY MEDICINE

## 2024-07-25 PROCEDURE — 36415 COLL VENOUS BLD VENIPUNCTURE: CPT | Performed by: EMERGENCY MEDICINE

## 2024-07-25 PROCEDURE — 85027 COMPLETE CBC AUTOMATED: CPT | Performed by: EMERGENCY MEDICINE

## 2024-07-25 PROCEDURE — 85730 THROMBOPLASTIN TIME PARTIAL: CPT | Performed by: EMERGENCY MEDICINE

## 2024-07-25 PROCEDURE — 81001 URINALYSIS AUTO W/SCOPE: CPT | Performed by: EMERGENCY MEDICINE

## 2024-07-25 ASSESSMENT — COLUMBIA-SUICIDE SEVERITY RATING SCALE - C-SSRS
6. HAVE YOU EVER DONE ANYTHING, STARTED TO DO ANYTHING, OR PREPARED TO DO ANYTHING TO END YOUR LIFE?: NO
1. IN THE PAST MONTH, HAVE YOU WISHED YOU WERE DEAD OR WISHED YOU COULD GO TO SLEEP AND NOT WAKE UP?: NO
2. HAVE YOU ACTUALLY HAD ANY THOUGHTS OF KILLING YOURSELF IN THE PAST MONTH?: NO

## 2024-07-25 ASSESSMENT — ACTIVITIES OF DAILY LIVING (ADL)
ADLS_ACUITY_SCORE: 35
ADLS_ACUITY_SCORE: 35

## 2024-07-26 ENCOUNTER — PATIENT OUTREACH (OUTPATIENT)
Dept: FAMILY MEDICINE | Facility: CLINIC | Age: 39
End: 2024-07-26
Payer: COMMERCIAL

## 2024-07-26 LAB
BASOPHILS # BLD MANUAL: 0.2 10E3/UL (ref 0–0.2)
BASOPHILS NFR BLD MANUAL: 1 %
EOSINOPHIL # BLD MANUAL: 0.5 10E3/UL (ref 0–0.7)
EOSINOPHIL NFR BLD MANUAL: 3 %
ERYTHROCYTE [DISTWIDTH] IN BLOOD BY AUTOMATED COUNT: 17.9 % (ref 10–15)
HCT VFR BLD AUTO: 37.4 % (ref 35–47)
HGB BLD-MCNC: 11.2 G/DL (ref 11.7–15.7)
LYMPHOCYTES # BLD MANUAL: 5.8 10E3/UL (ref 0.8–5.3)
LYMPHOCYTES NFR BLD MANUAL: 36 %
MCH RBC QN AUTO: 28.3 PG (ref 26.5–33)
MCHC RBC AUTO-ENTMCNC: 29.9 G/DL (ref 31.5–36.5)
MCV RBC AUTO: 94 FL (ref 78–100)
MONOCYTES # BLD MANUAL: 1 10E3/UL (ref 0–1.3)
MONOCYTES NFR BLD MANUAL: 6 %
NEUTROPHILS # BLD MANUAL: 8.7 10E3/UL (ref 1.6–8.3)
NEUTROPHILS NFR BLD MANUAL: 54 %
NRBC # BLD AUTO: 0 10E3/UL
NRBC BLD AUTO-RTO: 0 /100
PATH REV: ABNORMAL
PLAT MORPH BLD: ABNORMAL
PLATELET # BLD AUTO: 424 10E3/UL (ref 150–450)
RBC # BLD AUTO: 3.96 10E6/UL (ref 3.8–5.2)
RBC MORPH BLD: ABNORMAL
VARIANT LYMPHS BLD QL SMEAR: PRESENT
WBC # BLD AUTO: 16.2 10E3/UL (ref 4–11)

## 2024-07-26 NOTE — TELEPHONE ENCOUNTER
Transitions of Care Outreach  Chief Complaint   Patient presents with    Hospital F/U       Most Recent Admission Date: 7/25/2024   Most Recent Admission Diagnosis:      Most Recent Discharge Date: 7/26/2024   Most Recent Discharge Diagnosis: Anal fissure - K60.2     Transitions of Care Assessment    Discharge Assessment  How are you doing now that you are home?: Better  How are your symptoms? (Red Flag symptoms escalate to triage hotline per guidelines): Improved  Do you know how to contact your clinic care team if you have future questions or changes to your health status? : Yes  Does the patient have their discharge instructions? : Yes  Does the patient have questions regarding their discharge instructions? : No  Were you started on any new medications or were there changes to any of your previous medications? : No  Does the patient have all of their medications?: Yes  Do you have questions regarding any of your medications? : No  Do you have all of your needed medical supplies or equipment (DME)?  (i.e. oxygen tank, CPAP, cane, etc.): Yes    Follow up Plan     Discharge Follow-Up  Discharge follow up appointment scheduled in alignment with recommended follow up timeframe or Transitions of Risk Category? (Low = within 30 days; Moderate= within 14 days; High= within 7 days): Yes  Discharge Follow Up Appointment Date: 08/01/24  Discharge Follow Up Appointment Scheduled with?: Primary Care Provider    Future Appointments   Date Time Provider Department Center   7/30/2024  2:15 PM CL LAB [facility]   8/1/2024  3:30 PM Jaleesa Velasquez APRN CNP [facility]   8/5/2024  1:00 PM [facility]olly Riley [facility]   8/6/2024  2:30 PM Shital Roberts NP [facility]   8/15/2024  8:00 PM SLEEP STUDY RM 2 [facility] [place]   8/30/2024 12:00 PM Francia Escobar APRN CNP [facility]       Outpatient Plan as outlined on AVS reviewed with patient.    For any urgent concerns, please contact our 24 hour  nurse triage line: [phone] ([phone])       Brittany Decker RN

## 2024-07-26 NOTE — ED PROVIDER NOTES
History     Chief Complaint   Patient presents with    Vaginal Bleeding     HPI  Divina Delgadillo is a 39 year old female with history complex past medical history with c/o vaginal bleeding and abdominal pain. Blood from her vagina with bowel movements for past couple of weeks. Lower abdominal pain today. She is not certain whether of not blood is coming from her vagina of from rectum. Denies prior rectal bleeding. Does have pain with bowel movements recently. Has IUD and not menstruating. No abnormal vaginal discharge. No fevers, chills, nausea, or vomiting.     The patient's PMHx, Surgical Hx, Allergies, and Medications were all reviewed with the patient.    Allergies:  Allergies   Allergen Reactions    Acetaminophen Other (See Comments)     pt previously tried to overdose on it, PMD does not want pt taking per pt.        Problem List:    Patient Active Problem List    Diagnosis Date Noted    Peroneal tendinitis of left lower extremity 06/14/2024     Priority: Medium    Acute left ankle pain 06/14/2024     Priority: Medium    Avascular necrosis of bone of ankle (H) 06/14/2024     Priority: Medium    Other fracture of left femur, initial encounter for closed fracture (H) 01/21/2024     Priority: Medium    Chiari malformation type I (H) 06/12/2023     Priority: Medium    Ulnar neuropathy of both upper extremities 09/20/2022     Priority: Medium    Insomnia, unspecified type 08/30/2021     Priority: Medium    History of DVT of arm  (deep vein thrombosis) 01/23/2021     Priority: Medium     ultrasound 1/6/2017-  venous thrombus in the antecubital fossa region and the left forearm as described      Schizoaffective disorder, depressive type (H) 07/19/2019     Priority: Medium    Acquired asplenia 03/22/2019     Priority: Medium    Chronic leukocytosis 11/27/2018     Priority: Medium     Due to spleen removal      Nicotine abuse 08/13/2018     Priority: Medium    Mild intermittent asthma with acute exacerbation  01/16/2018     Priority: Medium    Morbid obesity (H) 01/09/2018     Priority: Medium    IUD (intrauterine device) in place 07/26/2017     Priority: Medium     Mirena IUD inserted in office with premed 7/26/2017        Cervical high risk HPV (human papillomavirus) test positive 06/20/2017     Priority: Medium     6/15/17 NIL, +HR HPV (not 16/18). Plan cotest in 1 year  6/14/18 NIL pap, neg HPV. Plan cotest in 3 years  3/26/21 NIL pap, Neg HPV. Plan cotest in 3 years.   4/24/24 NIL pap, +HR HPV (not 16/18). 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, splenectomy, left adrenalectomy 02/26/2015;      Type 2 diabetes mellitus with stage 3 chronic kidney disease, with long-term current use of insulin (H) 02/27/2017     Priority: Medium    Bipolar disorder (H) 02/27/2017     Priority: Medium    Orthostatic hypotension 01/10/2017     Priority: Medium    Tobacco abuse 01/10/2017     Priority: Medium    Long term (current) use of anticoagulants 01/09/2017     Priority: Medium    Deep venous thrombosis of arm (H) 01/09/2017     Priority: Medium    Stage 3 chronic kidney disease 12/03/2015     Priority: Medium     Overview:   Updated per 10/1/17 IMO import      Neutrophilic leukocytosis 08/28/2015     Priority: Medium    Vitamin D insufficiency 06/01/2015     Priority: Medium    Cardiac murmur 08/20/2013     Priority: Medium    Depressive disorder 09/15/2012     Priority: Medium    Hyperlipidemia LDL goal <100 10/31/2010     Priority: Medium    Borderline personality disorder (H) 11/06/2009     Priority: Medium    Essential hypertension 06/13/2008     Priority: Medium    NAFL (nonalcoholic fatty liver) 04/11/2006     Priority: Medium     See flowsheet for hepatic panel.   Stopped zocor, was on godon as well had right upper quandrant ultrasound  and ct  ? Psych med related vs [facility]      Esophageal reflux 04/14/2005     Priority: Medium     GERD      PTSD (post-traumatic stress disorder) 01/01/2001     Priority: Medium        Past Medical History:    Past Medical History:   Diagnosis Date    Acquired asplenia     Acute pain of left knee 03/22/2019    Acute-on-chronic kidney injury  (H24) 03/22/2019    ARF (acute renal failure) (H24) 03/23/2019    Asthma     Bipolar disorder (H)     Cardiac murmur     Cervical high risk HPV (human papillomavirus) test positive 06/20/2017    Chiari malformation type I (H)     Chronic bilateral low back pain without sciatica     Deep venous thrombosis of arm (H) 01/06/2017    Depressive disorder     DVT (deep venous thrombosis) (H)     DVT of upper extremity (deep vein thrombosis) (H) 2021    Esophageal reflux     Hypertension     Insomnia     Leukocytosis     Mild intermittent asthma     Morbid obesity (H)     Mucinous neoplasm of pancreas     NAFL (nonalcoholic fatty liver)     Neck pain     Nicotine abuse     Other specified types of schizophrenia, unspecified condition     Schizoaffective disorder, depressive type (H)     Stage 3a chronic kidney disease (CKD) (H)     Type 2 diabetes mellitus (H)     Ulnar neuropathy of both upper extremities     Vitamin D insufficiency        Past Surgical History:    Past Surgical History:   Procedure Laterality Date    ADRENALECTOMY Left 2015    BIOPSY      BREAST SURGERY  2013    COLONOSCOPY      ENT SURGERY  10/01/2000    Tympanoplasty    IR LIVER BIOPSY PERCUTANEOUS  11/14/2019    PANCREATECTOMY PARTIAL  2015    PERCUTANEOUS BIOPSY LIVER Right 11/14/2019    Procedure: Percutaneous Liver Biopsy;  Surgeon: Sean Guzman PA-C;  Location: [facility] OR    SPLENECTOMY  2015    TONSILLECTOMY  10/01/2000    Tonsillectomy       Family History:    Family History   Problem Relation Age of Onset    Respiratory Mother         ASTHMA    Allergies Mother     Depression Mother     Chronic  Obstructive Pulmonary Disease Mother     Anxiety Disorder Mother     Mental Illness Mother     Asthma Mother     Heart Disease Father         HEART VALVE REPLACEMENT    Hypertension Father     Diabetes Father     Depression Father     Anxiety Disorder Father     Mental Illness Father     Obesity Father     Sleep Apnea Maternal Grandfather     Respiratory Brother     Allergies Brother     Respiratory Sister     Allergies Sister     Depression Sister     Respiratory Sister     Allergies Sister     Depression Sister     Kidney Disease No family hx of        Social History:  Marital Status:  Single [1]  Social History     Tobacco Use    Smoking status: Former     Current packs/day: 0.00     Average packs/day: 0.5 packs/day for 0.2 years (0.1 ttl pk-yrs)     Types: Cigarettes     Start date: 2023     Quit date: 2024     Years since quittin.4     Passive exposure: Yes    Smokeless tobacco: Never    Tobacco comments:     A pack every 3 days   Vaping Use    Vaping status: Every Day    Substances: Nicotine, Flavoring    Devices: Disposable   Substance Use Topics    Alcohol use: No     Comment: She is in AA and just got her 18 month medalion (2024)    Drug use: No        Medications:    nitroGLYcerin 0.2% ointment  ACCU-CHEK GUIDE test strip  acetaminophen (TYLENOL) 325 MG tablet  albuterol (VENTOLIN HFA) 108 (90 Base) MCG/ACT inhaler  alendronate (FOSAMAX) 70 MG tablet  ARIPiprazole (ABILIFY) 30 MG tablet  atorvastatin (LIPITOR) 20 MG tablet  Biotin (BIOTIN FORTE) 5 MG TABS  blood glucose (ACCU-CHEK GUIDE) test strip  bumetanide (BUMEX) 0.5 MG tablet  carvedilol (COREG) 12.5 MG tablet  cloZAPine (CLOZARIL) 50 MG tablet  diclofenac (VOLTAREN) 1 % topical gel  FLUoxetine (PROZAC) 40 MG capsule  glucose (BD GLUCOSE) 4 g chewable tablet  hydrOXYzine HCl (ATARAX) 25 MG tablet  insulin aspart (NOVOLOG VIAL) 100 UNITS/ML vial  Insulin Infusion Pump (MINIMED 780G INSULIN PUMP) KIT  Insulin Infusion Pump Supplies  "(EXTENDED INFUSION SET 23\"/6MM) MISC  Insulin Infusion Pump Supplies (EXTENDED RESERVOIR 3ML) MISC  lamoTRIgine (LAMICTAL) 100 MG tablet  lisinopril (ZESTRIL) 2.5 MG tablet  loratadine (CLARITIN) 10 MG tablet  meclizine (ANTIVERT) 25 MG tablet  omeprazole (PRILOSEC) 20 MG DR capsule  oxyCODONE (ROXICODONE) 5 MG tablet  Suvorexant (BELSOMRA) 10 MG tablet  topiramate (TOPAMAX) 25 MG tablet          Review of Systems  Pertinent positives and negatives mentioned in HPI    Physical Exam   BP: (!) 170/60  Pulse: 70  Temp: 98.1  F (36.7  C)  Resp: 18  Weight: 102.1 kg (225 lb)  SpO2: 94 %    GEN: Awake, alert, and cooperative.   HENT: MMM. External ears and nose normal bilaterally.  EYES: EOM intact. Conjunctiva clear. No discharge.   NECK: Symmetric, freely mobile.   CV : Extremities warm and well perfused.  PULM: Normal effort. Speaking in full sentences.  ABD: soft and non distended. Mild periumbilical discomfort w/out signs of peritonitis.   : no signs of blood in vaginal vault. No blood from cervical Os. Strings from IUD are visible.   RECTAL: posterior fissure with no active bleeding. No internal masses palpated. No stool or blood on gloved finger.   NEURO: Normal speech. Following commands.  Answering questions and interacting appropriately.   EXT: No gross deformity.   INT: Warm. No diaphoresis. Normal color.     ED Course        Procedures                 Critical Care time:  none               Results for orders placed or performed during the hospital encounter of 07/25/24 (from the past 24 hour(s))   UA with Microscopic reflex to Culture    Specimen: Urine, Clean Catch   Result Value Ref Range    Color Urine Yellow Colorless, Straw, Light Yellow, Yellow    Appearance Urine Clear Clear    Glucose Urine Negative Negative mg/dL    Bilirubin Urine Negative Negative    Ketones Urine Negative Negative mg/dL    Specific Gravity Urine 1.015 1.003 - 1.035    Blood Urine Negative Negative    pH Urine 6.0 5.0 - 7.0    " Protein Albumin Urine Negative Negative mg/dL    Urobilinogen Urine Normal Normal, 2.0 mg/dL    Nitrite Urine Negative Negative    Leukocyte Esterase Urine Negative Negative    Bacteria Urine Few (A) None Seen /HPF    RBC Urine <1 <=2 /HPF    WBC Urine 2 <=5 /HPF    Squamous Epithelials Urine 3 (H) <=1 /HPF    Narrative    Urine Culture not indicated   CBC with platelets differential    Narrative    The following orders were created for panel order CBC with platelets differential.  Procedure                               Abnormality         Status                     ---------                               -----------         ------                     CBC with platelets and d...[[id number]]                      In process                 RBC and Platelet Morphology[[id number]]                      Edited Result - FINAL      Manual Differential[[id number]]                              In process                   Please view results for these tests on the individual orders.   Basic metabolic panel   Result Value Ref Range    Sodium 143 135 - 145 mmol/L    Potassium 4.7 3.4 - 5.3 mmol/L    Chloride 108 (H) 98 - 107 mmol/L    Carbon Dioxide (CO2) 23 22 - 29 mmol/L    Anion Gap 12 7 - 15 mmol/L    Urea Nitrogen 32.2 (H) 6.0 - 20.0 mg/dL    Creatinine 1.68 (H) 0.51 - 0.95 mg/dL    GFR Estimate 39 (L) >60 mL/min/1.73m2    Calcium 8.9 8.8 - 10.4 mg/dL    Glucose 163 (H) 70 - 99 mg/dL   INR   Result Value Ref Range    INR 0.93 0.85 - 1.15   Partial thromboplastin time   Result Value Ref Range    aPTT 27 22 - 38 Seconds   RBC and Platelet Morphology   Result Value Ref Range    RBC Morphology Confirmed RBC Indices     Platelet Assessment  Automated Count Confirmed. Platelet morphology is normal.     Automated Count Confirmed. Platelet morphology is normal.     *Note: Due to a large number of results and/or encounters for the requested time period, some results have not been displayed. A complete set of results can be found in  Results Review.       Medications - No data to display    Assessments & Plan (with Medical Decision Making)   39 year old female with complex past medical history with several weeks of discomfort and bleeding with bowel movements. Patient is not sure if blood is from rectum or vagina. No fevers, nausea or vomiting. Mild periumbilical tenderness on abdominal exam but no signs of peritonitis. No bleeding on pelvic exam. Posterior fissure on rectal exam. My suspicion is that this is the source of her symptoms. No stool returned on gloved finger during SLIME. If is possible that she is also having lower GI bleeding but less likely to have both this and a fissure at the same time.  I think be reasonable to treat the fissure first.  We discussed stool softeners, I sent a prescription for nitroglycerin ointment and sits baths.  She need to follow-up with her primary care provider in 1 to 2 weeks to see how she is responding to treatment.  Would defer any further testing until that point.  I do not feel that any imaging of her abdomen necessary today.She does have an elevation of her white blood cell count also with chronic leukocytosis.  Hemoglobin of 11.2 which appears to be her baseline.  It was 11.0 a month ago and 11.5 2 months ago.  BMP with creatinine of 1.68 which is slightly off her recent baseline and most recent values of 1.34 and 1.36.  Normal INR and PTT.    Okay to discharge and she is eager to go home. F/up and return precautions discussed.          I have reviewed the nursing notes.         Discharge Medication List as of 7/26/2024 12:13 AM        START taking these medications    Details   nitroGLYcerin 0.2% ointment Apply 1 click (0.25 g) topically every 24 hours for 14 days, Disp-4 g, R-0, E-PrescribeDispense in dosing applicator. 1 click = 0.25g of product.             Final diagnoses:   Anal fissure     Guicho Coulter MD        7/25/2024   [facility] EMERGENCY DEPT    Disclaimer: This  note consists of words and symbols derived from keyboarding and dictation using voice recognition software.  As a result, there may be errors that have gone undetected.  Please consider this when interpreting information found in this note.               Guicho Coulter MD  07/29/24 [id number]

## 2024-07-26 NOTE — DISCHARGE INSTRUCTIONS
You have a tear in the skin around your anus.     Drink plenty of fluids to keep bowels soft.     Keep area clean and dry.     Apply topical nitroglycerin around your anus once daily.     Soak and warm water (bathtub) for comfort.     Follow up in clinic.     If your symptoms worsen or you develop new or concerning symptoms, please return to the Emergency Department for further evaluation and treatment.

## 2024-07-26 NOTE — ED TRIAGE NOTES
Vaginal bleeding when having bowel movements for the past couple of weeks.  Today developed lower abdominal pain.  Patient stated that she has had an IUD for the past 5 years and doesn't have a period.       Triage Assessment (Adult)       Row Name 07/25/24 4229          Triage Assessment    Airway WDL WDL        Respiratory WDL    Respiratory WDL WDL        Skin Circulation/Temperature WDL    Skin Circulation/Temperature WDL WDL        Peripheral/Neurovascular WDL    Peripheral Neurovascular WDL WDL        Cognitive/Neuro/Behavioral WDL    Cognitive/Neuro/Behavioral WDL WDL

## 2024-07-30 ENCOUNTER — LAB (OUTPATIENT)
Dept: LAB | Facility: CLINIC | Age: 39
End: 2024-07-30
Payer: COMMERCIAL

## 2024-07-30 DIAGNOSIS — E11.42 TYPE 2 DIABETES MELLITUS WITH DIABETIC POLYNEUROPATHY (H): ICD-10-CM

## 2024-07-30 DIAGNOSIS — N18.30 STAGE 3 CHRONIC KIDNEY DISEASE (H): ICD-10-CM

## 2024-07-30 DIAGNOSIS — Z79.4 TYPE 2 DIABETES MELLITUS WITH STAGE 3A CHRONIC KIDNEY DISEASE, WITH LONG-TERM CURRENT USE OF INSULIN (H): ICD-10-CM

## 2024-07-30 DIAGNOSIS — F25.0 SCHIZOAFFECTIVE DISORDER, BIPOLAR TYPE (H): ICD-10-CM

## 2024-07-30 DIAGNOSIS — D72.828 ACQUIRED NEUTROPHILIA: ICD-10-CM

## 2024-07-30 DIAGNOSIS — N18.30 STAGE 3 CHRONIC KIDNEY DISEASE (H): Chronic | ICD-10-CM

## 2024-07-30 DIAGNOSIS — E78.5 HYPERLIPIDEMIA LDL GOAL <100: ICD-10-CM

## 2024-07-30 DIAGNOSIS — N17.9 AKI (ACUTE KIDNEY INJURY) (H): ICD-10-CM

## 2024-07-30 DIAGNOSIS — E11.65 UNCONTROLLED TYPE 2 DIABETES MELLITUS WITH HYPERGLYCEMIA (H): ICD-10-CM

## 2024-07-30 DIAGNOSIS — E11.22 TYPE 2 DIABETES MELLITUS WITH STAGE 3A CHRONIC KIDNEY DISEASE, WITH LONG-TERM CURRENT USE OF INSULIN (H): ICD-10-CM

## 2024-07-30 DIAGNOSIS — N18.31 TYPE 2 DIABETES MELLITUS WITH STAGE 3A CHRONIC KIDNEY DISEASE, WITH LONG-TERM CURRENT USE OF INSULIN (H): ICD-10-CM

## 2024-07-30 LAB
ALBUMIN MFR UR ELPH: 19.9 MG/DL
ALBUMIN SERPL BCG-MCNC: 3.7 G/DL (ref 3.5–5.2)
ALBUMIN UR-MCNC: NEGATIVE MG/DL
ALP SERPL-CCNC: 223 U/L (ref 40–150)
ALT SERPL W P-5'-P-CCNC: 35 U/L (ref 0–50)
ANION GAP SERPL CALCULATED.3IONS-SCNC: 11 MMOL/L (ref 7–15)
APPEARANCE UR: CLEAR
AST SERPL W P-5'-P-CCNC: 21 U/L (ref 0–45)
BASOPHILS # BLD AUTO: 0.1 10E3/UL (ref 0–0.2)
BASOPHILS NFR BLD AUTO: 1 %
BILIRUB SERPL-MCNC: 0.3 MG/DL
BILIRUB UR QL STRIP: NEGATIVE
BUN SERPL-MCNC: 41 MG/DL (ref 6–20)
CALCIUM SERPL-MCNC: 8.8 MG/DL (ref 8.8–10.4)
CHLORIDE SERPL-SCNC: 104 MMOL/L (ref 98–107)
CHOLEST SERPL-MCNC: 190 MG/DL
COLOR UR AUTO: YELLOW
CREAT SERPL-MCNC: 2.37 MG/DL (ref 0.51–0.95)
CREAT UR-MCNC: 266.5 MG/DL
CREAT UR-MCNC: 269.5 MG/DL
CRP SERPL-MCNC: 11.23 MG/L
EGFRCR SERPLBLD CKD-EPI 2021: 26 ML/MIN/1.73M2
EOSINOPHIL # BLD AUTO: 0.2 10E3/UL (ref 0–0.7)
EOSINOPHIL NFR BLD AUTO: 1 %
ERYTHROCYTE [DISTWIDTH] IN BLOOD BY AUTOMATED COUNT: 17.1 % (ref 10–15)
ERYTHROCYTE [SEDIMENTATION RATE] IN BLOOD BY WESTERGREN METHOD: 55 MM/HR (ref 0–20)
FASTING STATUS PATIENT QL REPORTED: NO
GLUCOSE SERPL-MCNC: 75 MG/DL (ref 70–99)
GLUCOSE SERPL-MCNC: 75 MG/DL (ref 70–99)
GLUCOSE UR STRIP-MCNC: NEGATIVE MG/DL
HBA1C MFR BLD: 7.8 % (ref 0–5.6)
HCO3 SERPL-SCNC: 23 MMOL/L (ref 22–29)
HCT VFR BLD AUTO: 37.9 % (ref 35–47)
HDLC SERPL-MCNC: 45 MG/DL
HGB BLD-MCNC: 11.4 G/DL (ref 11.7–15.7)
HGB UR QL STRIP: NEGATIVE
IMM GRANULOCYTES # BLD: 0.1 10E3/UL
IMM GRANULOCYTES NFR BLD: 0 %
KETONES UR STRIP-MCNC: NEGATIVE MG/DL
LDLC SERPL CALC-MCNC: 116 MG/DL
LEUKOCYTE ESTERASE UR QL STRIP: NEGATIVE
LYMPHOCYTES # BLD AUTO: 3.9 10E3/UL (ref 0.8–5.3)
LYMPHOCYTES NFR BLD AUTO: 26 %
MCH RBC QN AUTO: 28 PG (ref 26.5–33)
MCHC RBC AUTO-ENTMCNC: 30.1 G/DL (ref 31.5–36.5)
MCV RBC AUTO: 93 FL (ref 78–100)
MICROALBUMIN UR-MCNC: 41.3 MG/L
MICROALBUMIN/CREAT UR: 15.5 MG/G CR (ref 0–25)
MONOCYTES # BLD AUTO: 0.7 10E3/UL (ref 0–1.3)
MONOCYTES NFR BLD AUTO: 5 %
NEUTROPHILS # BLD AUTO: 10.2 10E3/UL (ref 1.6–8.3)
NEUTROPHILS NFR BLD AUTO: 67 %
NITRATE UR QL: NEGATIVE
NONHDLC SERPL-MCNC: 145 MG/DL
PH UR STRIP: 5.5 [PH] (ref 5–7)
PHOSPHATE SERPL-MCNC: 4.3 MG/DL (ref 2.5–4.5)
PLATELET # BLD AUTO: 434 10E3/UL (ref 150–450)
POTASSIUM SERPL-SCNC: 5.1 MMOL/L (ref 3.4–5.3)
PROT SERPL-MCNC: 7.7 G/DL (ref 6.4–8.3)
PROT/CREAT 24H UR: 0.07 MG/MG CR (ref 0–0.2)
PTH-INTACT SERPL-MCNC: 169 PG/ML (ref 15–65)
RBC # BLD AUTO: 4.07 10E6/UL (ref 3.8–5.2)
SODIUM SERPL-SCNC: 138 MMOL/L (ref 135–145)
SP GR UR STRIP: 1.01 (ref 1–1.03)
TRIGL SERPL-MCNC: 144 MG/DL
UROBILINOGEN UR STRIP-ACNC: 0.2 E.U./DL
WBC # BLD AUTO: 15.1 10E3/UL (ref 4–11)

## 2024-07-30 PROCEDURE — 83036 HEMOGLOBIN GLYCOSYLATED A1C: CPT

## 2024-07-30 PROCEDURE — 85025 COMPLETE CBC W/AUTO DIFF WBC: CPT

## 2024-07-30 PROCEDURE — 84156 ASSAY OF PROTEIN URINE: CPT

## 2024-07-30 PROCEDURE — 84100 ASSAY OF PHOSPHORUS: CPT

## 2024-07-30 PROCEDURE — 80061 LIPID PANEL: CPT

## 2024-07-30 PROCEDURE — 82043 UR ALBUMIN QUANTITATIVE: CPT

## 2024-07-30 PROCEDURE — 36415 COLL VENOUS BLD VENIPUNCTURE: CPT

## 2024-07-30 PROCEDURE — 86140 C-REACTIVE PROTEIN: CPT

## 2024-07-30 PROCEDURE — 85652 RBC SED RATE AUTOMATED: CPT

## 2024-07-30 PROCEDURE — 83970 ASSAY OF PARATHORMONE: CPT

## 2024-07-30 PROCEDURE — 82570 ASSAY OF URINE CREATININE: CPT

## 2024-07-30 PROCEDURE — 83721 ASSAY OF BLOOD LIPOPROTEIN: CPT

## 2024-07-30 PROCEDURE — 81003 URINALYSIS AUTO W/O SCOPE: CPT

## 2024-07-30 PROCEDURE — 84681 ASSAY OF C-PEPTIDE: CPT

## 2024-07-30 PROCEDURE — 80053 COMPREHEN METABOLIC PANEL: CPT

## 2024-07-30 NOTE — RESULT ENCOUNTER NOTE
Not sure why this was ordered under me. Routing to you since I have not been managing this patient.

## 2024-07-31 LAB
C PEPTIDE SERPL-MCNC: 0.7 NG/ML (ref 0.9–6.9)
LDLC SERPL DIRECT ASSAY-MCNC: 114 MG/DL

## 2024-08-01 ENCOUNTER — OFFICE VISIT (OUTPATIENT)
Dept: FAMILY MEDICINE | Facility: CLINIC | Age: 39
End: 2024-08-01
Payer: COMMERCIAL

## 2024-08-01 VITALS
SYSTOLIC BLOOD PRESSURE: 124 MMHG | RESPIRATION RATE: 14 BRPM | WEIGHT: 228.7 LBS | HEIGHT: 65 IN | OXYGEN SATURATION: 98 % | TEMPERATURE: 98.4 F | HEART RATE: 73 BPM | DIASTOLIC BLOOD PRESSURE: 68 MMHG | BODY MASS INDEX: 38.1 KG/M2

## 2024-08-01 DIAGNOSIS — K60.2 ANAL FISSURE: Primary | ICD-10-CM

## 2024-08-01 DIAGNOSIS — L65.9 HAIR LOSS: ICD-10-CM

## 2024-08-01 DIAGNOSIS — K59.00 CONSTIPATION, UNSPECIFIED CONSTIPATION TYPE: ICD-10-CM

## 2024-08-01 DIAGNOSIS — S93.402D SPRAIN OF LEFT ANKLE, UNSPECIFIED LIGAMENT, SUBSEQUENT ENCOUNTER: ICD-10-CM

## 2024-08-01 PROCEDURE — 99214 OFFICE O/P EST MOD 30 MIN: CPT | Performed by: NURSE PRACTITIONER

## 2024-08-01 RX ORDER — ACETAMINOPHEN 500 MG
500-1000 TABLET ORAL EVERY 8 HOURS PRN
Qty: 30 TABLET | Refills: 0 | Status: SHIPPED | OUTPATIENT
Start: 2024-08-01

## 2024-08-01 RX ORDER — BIOTIN 5 MG
1 TABLET ORAL DAILY
Qty: 60 TABLET | Refills: 1 | Status: SHIPPED | OUTPATIENT
Start: 2024-08-01

## 2024-08-01 RX ORDER — DOCUSATE SODIUM 100 MG/1
100 CAPSULE, LIQUID FILLED ORAL 2 TIMES DAILY PRN
Qty: 60 CAPSULE | Refills: 1 | Status: SHIPPED | OUTPATIENT
Start: 2024-08-01

## 2024-08-01 ASSESSMENT — PAIN SCALES - GENERAL: PAINLEVEL: SEVERE PAIN (6)

## 2024-08-01 NOTE — PATIENT INSTRUCTIONS
Tylenol 500 mg every 8 hrs as needed for ankle pain    Start Colace 100 mg twice daily as needed for constipation

## 2024-08-01 NOTE — PROGRESS NOTES
"  Assessment & Plan     Anal fissure  -symptoms resolved, no more bleeding. Likely cause was constipation, recommended patient to start Colace 100 mg twice daily as needed for constipation     Hair loss  -improved   - Biotin (BIOTIN FORTE) 5 MG TABS; Take 1 tablet by mouth daily    Constipation, unspecified constipation type    - docusate sodium (COLACE) 100 MG capsule; Take 1 capsule (100 mg) by mouth 2 times daily as needed for constipation    Sprain of left ankle, unspecified ligament, subsequent encounter    - acetaminophen (TYLENOL) 500 MG tablet; Take 1-2 tablets (500-1,000 mg) by mouth every 8 hours as needed for mild pain    Subjective   Divina is a 39 year old, presenting for the following health issues:  ER F/U        8/1/2024     3:07 PM   Additional Questions   Roomed by quinn   Accompanied by self         8/1/2024     3:07 PM   Patient Reported Additional Medications   Patient reports taking the following new medications Care worker stanislav MCGREGOR       ED/UC Followup:    Facility:  [facility]  Date of visit: 7/25/24-7/26/24 (2 hrs)  Reason for visit: anal fissure   Current Status: doing good         Review of Systems  Constitutional, HEENT, cardiovascular, pulmonary, gi and gu systems are negative, except as otherwise noted.      Objective    /68 (BP Location: Left arm, Patient Position: Sitting, Cuff Size: Adult Regular)   Pulse 73   Temp 98.4  F (36.9  C) (Tympanic)   Resp 14   Ht 1.657 m (5' 5.24\")   Wt 103.7 kg (228 lb 11.2 oz)   SpO2 98%   BMI 37.78 kg/m    Body mass index is 37.78 kg/m .  Physical Exam   GENERAL: alert and no distress  EYES: Eyes grossly normal to inspection, PERRL and conjunctivae and sclerae normal  ABDOMEN: soft, nontender, no hepatosplenomegaly, no masses and bowel sounds normal  MS: no gross musculoskeletal defects noted, no edema  SKIN: no suspicious lesions or rashes  NEURO: Normal strength and tone, mentation intact and speech normal  PSYCH: mentation appears " normal, affect normal/bright            Signed Electronically by: VERONIQUE Spears CNP

## 2024-08-02 ENCOUNTER — APPOINTMENT (OUTPATIENT)
Dept: GENERAL RADIOLOGY | Facility: CLINIC | Age: 39
End: 2024-08-02
Attending: EMERGENCY MEDICINE
Payer: COMMERCIAL

## 2024-08-02 ENCOUNTER — HOSPITAL ENCOUNTER (EMERGENCY)
Facility: CLINIC | Age: 39
Discharge: HOME | End: 2024-08-03
Attending: EMERGENCY MEDICINE | Admitting: EMERGENCY MEDICINE
Payer: COMMERCIAL

## 2024-08-02 DIAGNOSIS — M79.652 PAIN OF LEFT THIGH: ICD-10-CM

## 2024-08-02 PROCEDURE — 250N000013 HC RX MED GY IP 250 OP 250 PS 637: Performed by: EMERGENCY MEDICINE

## 2024-08-02 PROCEDURE — 73560 X-RAY EXAM OF KNEE 1 OR 2: CPT | Mod: LT

## 2024-08-02 PROCEDURE — 99284 EMERGENCY DEPT VISIT MOD MDM: CPT | Performed by: EMERGENCY MEDICINE

## 2024-08-02 PROCEDURE — 73590 X-RAY EXAM OF LOWER LEG: CPT | Mod: LT

## 2024-08-02 PROCEDURE — 73502 X-RAY EXAM HIP UNI 2-3 VIEWS: CPT

## 2024-08-02 RX ORDER — IBUPROFEN 600 MG/1
600 TABLET, FILM COATED ORAL ONCE
Status: DISCONTINUED | OUTPATIENT
Start: 2024-08-02 | End: 2024-08-02

## 2024-08-02 RX ORDER — OXYCODONE HYDROCHLORIDE 5 MG/1
10 TABLET ORAL ONCE
Status: COMPLETED | OUTPATIENT
Start: 2024-08-02 | End: 2024-08-02

## 2024-08-02 RX ADMIN — OXYCODONE HYDROCHLORIDE 10 MG: 5 TABLET ORAL at 22:44

## 2024-08-02 ASSESSMENT — COLUMBIA-SUICIDE SEVERITY RATING SCALE - C-SSRS
2. HAVE YOU ACTUALLY HAD ANY THOUGHTS OF KILLING YOURSELF IN THE PAST MONTH?: NO
1. IN THE PAST MONTH, HAVE YOU WISHED YOU WERE DEAD OR WISHED YOU COULD GO TO SLEEP AND NOT WAKE UP?: NO
6. HAVE YOU EVER DONE ANYTHING, STARTED TO DO ANYTHING, OR PREPARED TO DO ANYTHING TO END YOUR LIFE?: NO

## 2024-08-02 ASSESSMENT — ACTIVITIES OF DAILY LIVING (ADL): ADLS_ACUITY_SCORE: 35

## 2024-08-03 VITALS
WEIGHT: 230 LBS | OXYGEN SATURATION: 92 % | HEART RATE: 71 BPM | DIASTOLIC BLOOD PRESSURE: 62 MMHG | RESPIRATION RATE: 18 BRPM | SYSTOLIC BLOOD PRESSURE: 153 MMHG | BODY MASS INDEX: 40.75 KG/M2 | HEIGHT: 63 IN | TEMPERATURE: 98.2 F

## 2024-08-03 ASSESSMENT — ACTIVITIES OF DAILY LIVING (ADL): ADLS_ACUITY_SCORE: 35

## 2024-08-03 NOTE — ED NOTES
Pt able to walk independently to the restroom, denies any pain when walking. Pt requesting to go home, doctor notified.

## 2024-08-03 NOTE — ED TRIAGE NOTES
Patient had a left femur injury in January and had a ulysses placed. Tonight patient twisted and felt something pop. Is able to bear partial weight. Left upper leg pain that radiates to left lower leg. Able to wiggle toes. CMS intact.     Triage Assessment (Adult)       Row Name 08/02/24 8553          Triage Assessment    Airway WDL WDL        Respiratory WDL    Respiratory WDL WDL        Skin Circulation/Temperature WDL    Skin Circulation/Temperature WDL WDL        Cardiac WDL    Cardiac WDL WDL        Peripheral/Neurovascular WDL    Peripheral Neurovascular WDL WDL        Cognitive/Neuro/Behavioral WDL    Cognitive/Neuro/Behavioral WDL WDL

## 2024-08-03 NOTE — ED PROVIDER NOTES
History     Chief Complaint   Patient presents with    Leg Pain     HPI  Divina Delgadillo is a 39 year old female who presents for left leg pain.  The patient says that shortly prior to her coming here she twisted and felt pain and a popping in the left leg.  She has severe pain in the left hip radiating down the thigh and to the knee and proximal tibia.  She called EMS and came here for further evaluation.  No other injuries.  No headache, nausea, vomiting, or chest pain.  She has not take anything for the pain.  The patient says that the surgery she had on her femur was in [place] in January 2024.    Allergies:  Allergies   Allergen Reactions    Acetaminophen Other (See Comments)     pt previously tried to overdose on it, PMD does not want pt taking per pt.        Problem List:    Patient Active Problem List    Diagnosis Date Noted    Peroneal tendinitis of left lower extremity 06/14/2024     Priority: Medium    Acute left ankle pain 06/14/2024     Priority: Medium    Avascular necrosis of bone of ankle (H) 06/14/2024     Priority: Medium    Other fracture of left femur, initial encounter for closed fracture (H) 01/21/2024     Priority: Medium    Chiari malformation type I (H) 06/12/2023     Priority: Medium    Ulnar neuropathy of both upper extremities 09/20/2022     Priority: Medium    Insomnia, unspecified type 08/30/2021     Priority: Medium    History of DVT of arm  (deep vein thrombosis) 01/23/2021     Priority: Medium     ultrasound 1/6/2017-  venous thrombus in the antecubital fossa region and the left forearm as described      Schizoaffective disorder, depressive type (H) 07/19/2019     Priority: Medium    Acquired asplenia 03/22/2019     Priority: Medium    Chronic leukocytosis 11/27/2018     Priority: Medium     Due to spleen removal      Nicotine abuse 08/13/2018     Priority: Medium    Mild intermittent asthma with acute exacerbation 01/16/2018     Priority: Medium    Morbid obesity (H) 01/09/2018      Priority: Medium    IUD (intrauterine device) in place 07/26/2017     Priority: Medium     Mirena IUD inserted in office with premed 7/26/2017        Cervical high risk HPV (human papillomavirus) test positive 06/20/2017     Priority: Medium     6/15/17 NIL, +HR HPV (not 16/18). Plan cotest in 1 year  6/14/18 NIL pap, neg HPV. Plan cotest in 3 years  3/26/21 NIL pap, Neg HPV. Plan cotest in 3 years.   4/24/24 NIL pap, +HR HPV (not 16/18). 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, splenectomy, left adrenalectomy 02/26/2015;      Type 2 diabetes mellitus with stage 3 chronic kidney disease, with long-term current use of insulin (H) 02/27/2017     Priority: Medium    Bipolar disorder (H) 02/27/2017     Priority: Medium    Orthostatic hypotension 01/10/2017     Priority: Medium    Tobacco abuse 01/10/2017     Priority: Medium    Long term (current) use of anticoagulants 01/09/2017     Priority: Medium    Deep venous thrombosis of arm (H) 01/09/2017     Priority: Medium    Stage 3 chronic kidney disease 12/03/2015     Priority: Medium     Overview:   Updated per 10/1/17 IMO import      Neutrophilic leukocytosis 08/28/2015     Priority: Medium    Vitamin D insufficiency 06/01/2015     Priority: Medium    Cardiac murmur 08/20/2013     Priority: Medium    Depressive disorder 09/15/2012     Priority: Medium    Hyperlipidemia LDL goal <100 10/31/2010     Priority: Medium    Borderline personality disorder (H) 11/06/2009     Priority: Medium    Essential hypertension 06/13/2008     Priority: Medium    NAFL (nonalcoholic fatty liver) 04/11/2006     Priority: Medium     See flowsheet for hepatic panel.   Stopped zocor, was on godon as well had right upper quandrant ultrasound and ct  ? Psych med related vs [facility]      Esophageal reflux  04/14/2005     Priority: Medium     GERD      PTSD (post-traumatic stress disorder) 01/01/2001     Priority: Medium        Past Medical History:    Past Medical History:   Diagnosis Date    Acquired asplenia     Acute pain of left knee 03/22/2019    Acute-on-chronic kidney injury  (H24) 03/22/2019    ARF (acute renal failure) (H24) 03/23/2019    Asthma     Bipolar disorder (H)     Cardiac murmur     Cervical high risk HPV (human papillomavirus) test positive 06/20/2017    Chiari malformation type I (H)     Chronic bilateral low back pain without sciatica     Deep venous thrombosis of arm (H) 01/06/2017    Depressive disorder     DVT (deep venous thrombosis) (H)     DVT of upper extremity (deep vein thrombosis) (H) 2021    Esophageal reflux     Hypertension     Insomnia     Leukocytosis     Mild intermittent asthma     Morbid obesity (H)     Mucinous neoplasm of pancreas     NAFL (nonalcoholic fatty liver)     Neck pain     Nicotine abuse     Other specified types of schizophrenia, unspecified condition     Schizoaffective disorder, depressive type (H)     Stage 3a chronic kidney disease (CKD) (H)     Type 2 diabetes mellitus (H)     Ulnar neuropathy of both upper extremities     Vitamin D insufficiency        Past Surgical History:    Past Surgical History:   Procedure Laterality Date    ADRENALECTOMY Left 2015    BIOPSY      BREAST SURGERY  2013    COLONOSCOPY      ENT SURGERY  10/01/2000    Tympanoplasty    IR LIVER BIOPSY PERCUTANEOUS  11/14/2019    PANCREATECTOMY PARTIAL  2015    PERCUTANEOUS BIOPSY LIVER Right 11/14/2019    Procedure: Percutaneous Liver Biopsy;  Surgeon: Sean Guzman PA-C;  Location: [facility] OR    SPLENECTOMY  2015    TONSILLECTOMY  10/01/2000    Tonsillectomy       Family History:    Family History   Problem Relation Age of Onset    Respiratory Mother         ASTHMA    Allergies Mother     Depression Mother     Chronic Obstructive Pulmonary Disease Mother     Anxiety Disorder Mother      Mental Illness Mother     Asthma Mother     Heart Disease Father         HEART VALVE REPLACEMENT    Hypertension Father     Diabetes Father     Depression Father     Anxiety Disorder Father     Mental Illness Father     Obesity Father     Sleep Apnea Maternal Grandfather     Respiratory Brother     Allergies Brother     Respiratory Sister     Allergies Sister     Depression Sister     Respiratory Sister     Allergies Sister     Depression Sister     Kidney Disease No family hx of        Social History:  Marital Status:  Single [1]  Social History     Tobacco Use    Smoking status: Former     Current packs/day: 0.00     Average packs/day: 0.5 packs/day for 0.2 years (0.1 ttl pk-yrs)     Types: Cigarettes     Start date: 2023     Quit date: 2024     Years since quittin.4     Passive exposure: Yes    Smokeless tobacco: Never    Tobacco comments:     A pack every 3 days   Vaping Use    Vaping status: Every Day    Substances: Nicotine, Flavoring    Devices: Disposable   Substance Use Topics    Alcohol use: No     Comment: She is in AA and just got her 18 month medalion (2024)    Drug use: No        Medications:    ACCU-CHEK GUIDE test strip  acetaminophen (TYLENOL) 325 MG tablet  acetaminophen (TYLENOL) 500 MG tablet  albuterol (VENTOLIN HFA) 108 (90 Base) MCG/ACT inhaler  alendronate (FOSAMAX) 70 MG tablet  ARIPiprazole (ABILIFY) 30 MG tablet  atorvastatin (LIPITOR) 20 MG tablet  Biotin (BIOTIN FORTE) 5 MG TABS  blood glucose (ACCU-CHEK GUIDE) test strip  bumetanide (BUMEX) 0.5 MG tablet  carvedilol (COREG) 12.5 MG tablet  cloZAPine (CLOZARIL) 50 MG tablet  diclofenac (VOLTAREN) 1 % topical gel  docusate sodium (COLACE) 100 MG capsule  FLUoxetine (PROZAC) 40 MG capsule  glucose (BD GLUCOSE) 4 g chewable tablet  hydrOXYzine HCl (ATARAX) 25 MG tablet  insulin aspart (NOVOLOG VIAL) 100 UNITS/ML vial  Insulin Infusion Pump (MINIMED 780G INSULIN PUMP) KIT  Insulin Infusion Pump Supplies (EXTENDED  "INFUSION SET 23\"/6MM) MISC  Insulin Infusion Pump Supplies (EXTENDED RESERVOIR 3ML) MISC  lamoTRIgine (LAMICTAL) 100 MG tablet  lisinopril (ZESTRIL) 2.5 MG tablet  loratadine (CLARITIN) 10 MG tablet  meclizine (ANTIVERT) 25 MG tablet  nitroGLYcerin 0.2% ointment  omeprazole (PRILOSEC) 20 MG DR capsule  oxyCODONE (ROXICODONE) 5 MG tablet  Suvorexant (BELSOMRA) 10 MG tablet  topiramate (TOPAMAX) 25 MG tablet          Review of Systems    Physical Exam   BP: (!) 158/55  Pulse: 72  Temp: 98.2  F (36.8  C)  Resp: 18  Height: 160 cm (5' 3\")  Weight: 104.3 kg (230 lb)  SpO2: 98 %      Physical Exam  Constitutional:       General: She is not in acute distress.     Appearance: She is well-developed.   HENT:      Head: Normocephalic and atraumatic.   Cardiovascular:      Rate and Rhythm: Normal rate.   Pulmonary:      Effort: No respiratory distress.      Breath sounds: No stridor.   Musculoskeletal:      Comments: Left hip: No deformity or swelling.  Tenderness throughout the hip and into the thigh.  Left knee: No deformity, normal range of motion, tenderness into the thigh and radiating down into the proximal tibia and fibula  Left ankle: Normal range of motion.  No tenderness, no deformity   Skin:     General: Skin is warm and dry.   Neurological:      Mental Status: She is alert.         ED Course        Procedures              Critical Care time:  none               Results for orders placed or performed during the hospital encounter of 08/02/24 (from the past 24 hour(s))   XR Knee Left 1/2 Views    Narrative    EXAM: XR KNEE LEFT 1/2 VIEWS, XR TIBIA AND FIBULA LEFT 2 VIEWS, XR PELVIS AND HIP LEFT 1 VIEW  LOCATION: [facility]  DATE: 8/2/2024    INDICATION: Pain after fall.  COMPARISON: None.      Impression    IMPRESSION: ORIF of the left femur. Exuberant callus formation about the chronic left proximal femoral fracture. A femoral fracture line remains visible. No prior studies available for " comparison. Follow-up recommended. No other evidence for acute   fracture or dislocation in the pelvis, left hip, left knee, or left tibia/fibula.   Pelvis XR w/ unilateral hip left    Narrative    EXAM: XR KNEE LEFT 1/2 VIEWS, XR TIBIA AND FIBULA LEFT 2 VIEWS, XR PELVIS AND HIP LEFT 1 VIEW  LOCATION: [facility]  DATE: 8/2/2024    INDICATION: Pain after fall.  COMPARISON: None.      Impression    IMPRESSION: ORIF of the left femur. Exuberant callus formation about the chronic left proximal femoral fracture. A femoral fracture line remains visible. No prior studies available for comparison. Follow-up recommended. No other evidence for acute   fracture or dislocation in the pelvis, left hip, left knee, or left tibia/fibula.   XR Tibia and Fibula Left 2 Views    Narrative    EXAM: XR KNEE LEFT 1/2 VIEWS, XR TIBIA AND FIBULA LEFT 2 VIEWS, XR PELVIS AND HIP LEFT 1 VIEW  LOCATION: [facility]  DATE: 8/2/2024    INDICATION: Pain after fall.  COMPARISON: None.      Impression    IMPRESSION: ORIF of the left femur. Exuberant callus formation about the chronic left proximal femoral fracture. A femoral fracture line remains visible. No prior studies available for comparison. Follow-up recommended. No other evidence for acute   fracture or dislocation in the pelvis, left hip, left knee, or left tibia/fibula.     *Note: Due to a large number of results and/or encounters for the requested time period, some results have not been displayed. A complete set of results can be found in Results Review.       Medications   oxyCODONE (ROXICODONE) tablet 10 mg (10 mg Oral $Given 8/2/24 8645)       Assessments & Plan (with Medical Decision Making)   39-year-old female presents with pain in the left hip radiating down into the leg.  She is given oxycodone and ibuprofen for the pain.  Multiple x-rays obtained.  Images interpreted independently as well as radiology read reviewed, her  hardware looks to be in good position.  No dislocations no displaced fracture.  She does have a large callus around the femur where she broke her leg and radiology comments on a fracture line still being present through this area.  The patient is feeling better after oxycodone and is able to ambulate but still feels as if something feels off.  I discussed this with the on-call orthopedic surgeon, we discussed ongoing management and he says that no change in management, the hardware will keep the leg stable and she is okay to walk on it.  I discussed this with the patient and she feels comfortable discharging home at this time with instructions to return if worse, otherwise follow-up in orthopedic clinic.  The patient is in agreement with this plan.    I have reviewed the nursing notes.    I have reviewed the findings, diagnosis, plan and need for follow up with the patient.           Medical Decision Making  The patient's presentation was of moderate complexity (an acute complicated injury).    The patient's evaluation involved:  ordering and/or review of 3+ test(s) in this encounter (see separate area of note for details)  independent interpretation of testing performed by another health professional (see separate area of note for details)  discussion of management or test interpretation with another health professional (see separate area of note for details)    The patient's management necessitated moderate risk (prescription drug management including medications given in the ED).        New Prescriptions    No medications on file       Final diagnoses:   Pain of left thigh       8/2/2024   [facility] EMERGENCY DEPT       Tyson Do MD  08/03/24 [id number]

## 2024-08-05 ENCOUNTER — TELEPHONE (OUTPATIENT)
Dept: ENDOCRINOLOGY | Facility: CLINIC | Age: 39
End: 2024-08-05

## 2024-08-05 ENCOUNTER — ALLIED HEALTH/NURSE VISIT (OUTPATIENT)
Dept: EDUCATION SERVICES | Facility: CLINIC | Age: 39
End: 2024-08-05
Payer: COMMERCIAL

## 2024-08-05 DIAGNOSIS — N18.31 TYPE 2 DIABETES MELLITUS WITH STAGE 3A CHRONIC KIDNEY DISEASE, WITH LONG-TERM CURRENT USE OF INSULIN (H): Primary | ICD-10-CM

## 2024-08-05 DIAGNOSIS — E11.22 TYPE 2 DIABETES MELLITUS WITH STAGE 3A CHRONIC KIDNEY DISEASE, WITH LONG-TERM CURRENT USE OF INSULIN (H): Primary | ICD-10-CM

## 2024-08-05 DIAGNOSIS — Z79.4 TYPE 2 DIABETES MELLITUS WITH STAGE 3A CHRONIC KIDNEY DISEASE, WITH LONG-TERM CURRENT USE OF INSULIN (H): Primary | ICD-10-CM

## 2024-08-05 PROCEDURE — G0108 DIAB MANAGE TRN  PER INDIV: HCPCS | Performed by: DIETITIAN, REGISTERED

## 2024-08-05 NOTE — LETTER
8/5/2024         RE: Divina Delgadillo  [address]  [place] [address]        Dear Colleague,    Thank you for referring your patient, Divina Delgadillo, to the [facility] [place]. Please see a copy of my visit note below.    Diabetes Self-Management Education & Support  Presents for: Insulin Pump Review  Type of Service: In Person Visit      ASSESSMENT:  Divina comes in for a general review after starting the [employer] 780 this past spring.  She is doing excellent and loves the system.  Had struggled with MDI and chronically high blood sugars with erratic hypoglycemia.  Blood sugars are extremely stble.  Has a few periods with the sensor off and talked about keeping this on as much as possible; when SImplera comes out it will be much easier.  Gets supplies through [employer] and has no issues with this.  Infusion sets last 3 days (never leaves on for more than 4).   Usually boluses before meals with carbohydrates very well and reviewed these examples in detail.  Would have expected A1c to decrease more given pump start was 3/5/24.  (Unable to pull 90 day reports from [employer] website right now for comparison as carelink is not working).  Consider fructosamine for additional comparison if A1c is not matching sensor glucose.  Reviewed automated dosing and daily reports in detail discussing insulin use, diet, carbohydrates etc.  Continue with positive diet & lifestyle changes.         Reviewed MDI back up plan & planning for emergencies   Needs new Latnus [facility] to [facility]   Needs Syringes for Novolog from vials if needed   Has 4mm insulin pen needles left over     Talked about emergency planning in detail and what to do if the pump breaks / malfunctions                  Patient's most recent   is not meeting goal of <7.0  Lab Results   Component Value Date    A1C 7.8 07/30/2024    A1C 8.7 05/09/2024    A1C 9.8 03/29/2024    A1C 8.3 09/13/2023    A1C 8.1 08/30/2023    A1C  "7.4 07/08/2021    A1C 8.0 03/26/2021    A1C 9.0 08/14/2020    A1C 7.8 05/18/2020    A1C 7.4 02/17/2020         Diabetes knowledge and skills assessment:   Patient is knowledgeable in diabetes management concepts related to: Healthy Eating, Being Active, Monitoring, Taking Medication, Problem Solving, Reducing Risks, and Healthy Coping  Continue education with the following diabetes management concepts: ongoing pump, sensor, medication review   Based on learning assessment above, most appropriate setting for further diabetes education would be: Individual setting.        PLAN  Continue with positive diet & lifestyle changes    Keep sensor on as much as able + bolus for meals as best as able   MDI back up plan - new Lantus & syringes   Follow up every 3-6 months with Diabetes ed & endocrinology    See Care Plan for co-developed, patient-state behavior change goals.  AVS provided for patient today.    SUBJECTIVE/OBJECTIVE:  Presents for: Insulin Pump Review  Accompanied by: Self  Diabetes education in the past 24mo: Yes  Focus of Visit: Insulin Pump  Type of Pump visit: Pre-pump  Diabetes type: Type 2  Disease course: Improving  How confident are you filling out medical forms by yourself:: Quite a bit  Other concerns:: None  Cultural Influences/Ethnic Background:  Not  or     Diabetes Symptoms & Complications:  Disease course: Improving  Nephropathy: Yes  Peripheral neuropathy: Yes    Patient Problem List and Family Medical History reviewed for relevant medical history, current medical status, and diabetes risk factors.    Vitals:  There were no vitals taken for this visit.  Estimated body mass index is 40.74 kg/m  as calculated from the following:    Height as of 8/2/24: 1.6 m (5' 3\").    Weight as of 8/2/24: 104.3 kg (230 lb).   Last 3 BP:   BP Readings from Last 3 Encounters:   08/03/24 (!) 153/62   08/01/24 124/68   07/25/24 (!) 170/60       History   Smoking Status     Former     Types: Cigarettes " "  Smokeless Tobacco     Never       Labs:  Lab Results   Component Value Date    A1C 7.8 07/30/2024    A1C 7.4 07/08/2021     Lab Results   Component Value Date    GLC 75 07/30/2024    GLC 75 07/30/2024     09/18/2023     12/19/2022     07/08/2021     Lab Results   Component Value Date     07/30/2024     07/30/2024    LDL 72 03/26/2021     HDL Cholesterol   Date Value Ref Range Status   03/26/2021 57 >49 mg/dL Final     Direct Measure HDL   Date Value Ref Range Status   07/30/2024 45 (L) >=50 mg/dL Final   ]  GFR Estimate   Date Value Ref Range Status   07/30/2024 26 (L) >60 mL/min/1.73m2 Final     Comment:     eGFR calculated using 2021 CKD-EPI equation.   07/08/2021 52 (L) >60 mL/min/[1.73_m2] Final     Comment:     Non  GFR Calc  Starting 12/18/2018, serum creatinine based estimated GFR (eGFR) will be   calculated using the Chronic Kidney Disease Epidemiology Collaboration   (CKD-EPI) equation.       GFR Estimate If Black   Date Value Ref Range Status   07/08/2021 60 (L) >60 mL/min/[1.73_m2] Final     Comment:      GFR Calc  Starting 12/18/2018, serum creatinine based estimated GFR (eGFR) will be   calculated using the Chronic Kidney Disease Epidemiology Collaboration   (CKD-EPI) equation.       Lab Results   Component Value Date    CR 2.37 07/30/2024    CR 1.32 07/08/2021     No results found for: \"MICROALBUMIN\"    Healthy Eating:  Healthy Eating Assessed Today: Yes  Cultural/Hoahaoism diet restrictions?: No  Meal planning/habits: Carb counting  How many times a week on average do you eat food made away from home (restaurant/take-out)?: 3    Being Active:  Being Active Assessed Today: Yes  Barrier to exercise: Physical limitation    Monitoring:  Monitoring Assessed Today: Yes  Blood Glucose Meter: CGM  Times checking blood sugar at home (number): 5+  Times checking blood sugar at home (per): Day  Blood glucose trend: Other    Taking " Medications:  Diabetes Medication(s)       Diabetic Other       glucose (BD GLUCOSE) 4 g chewable tablet Take 1 tablet by mouth every hour as needed for low blood sugar       Insulin       insulin aspart (NOVOLOG VIAL) 100 UNITS/ML vial for use with continuous insulin pump  Max TDD 80            Current Treatments: Insulin Injections  Dose schedule: Pre-breakfast, Pre-lunch, Pre-dinner  Given by: Patient    Problem Solving:  Yes      Reducing Risks:  Reducing Risks Assessed Today: Yes  CAD Risks: Diabetes Mellitus, Family history, Hypertension, Stress  Has dilated eye exam at least once a year?: No  Sees dentist every 6 months?: No  Feet checked by healthcare provider in the last year?: No    Healthy Coping:  Healthy Coping Assessed Today: Yes  Emotional response to diabetes: Ready to learn  Informal Support system:: Family, Other, Brenda based  Stage of change: ACTION (Actively working towards change)  Patient Activation Measure Survey Score:      5/10/2018     1:00 PM   EMY Score (Last Two)   EMY Raw Score 35   Activation Score 72.1   EMY Level 3     Care Plan and Education Provided:  Healthy Eating: Balanced meals, Being Active: Relationship of activity to glucose, Monitoring: continuous glucose monitor use , Taking Medication: Action of prescribed medication(s), Problem Solving: High glucose - causes, signs/symptoms, treatment and prevention, Low glucose - causes, signs/symptoms, treatment and prevention, Rule of 15 and carrying a carbohydrate source at all times in case of low glucose, and Safe travel,   Dolly Riley RD, LD, [facility]  Diabetes Education      Time Spent: 45 minutes  Encounter Type: Individual    Any diabetes medication dose changes were made via the CDE Protocol per the patient's referring provider. A copy of this encounter was shared with the provider.

## 2024-08-05 NOTE — PROGRESS NOTES
Diabetes Self-Management Education & Support  Presents for: Insulin Pump Review  Type of Service: In Person Visit      ASSESSMENT:  Divina comes in for a general review after starting the [employer] 780 this past spring.  She is doing excellent and loves the system.  Had struggled with MDI and chronically high blood sugars with erratic hypoglycemia.  Blood sugars are extremely stble.  Has a few periods with the sensor off and talked about keeping this on as much as possible; when SImplera comes out it will be much easier.  Gets supplies through [employer] [facility] and has no issues with this.  Infusion sets last 3 days (never leaves on for more than 4).   Usually boluses before meals with carbohydrates very well and reviewed these examples in detail.  Would have expected A1c to decrease more given pump start was 3/5/24.  (Unable to pull 90 day reports from [employer] website right now for comparison as [employer] is not working).  Consider fructosamine for additional comparison if A1c is not matching sensor glucose.  Reviewed automated dosing and daily reports in detail discussing insulin use, diet, carbohydrates etc.  Continue with positive diet & lifestyle changes.         Reviewed MDI back up plan & planning for emergencies   Needs new Latnus [facility]   Needs Syringes for Novolog from vials if needed   Has 4mm insulin pen needles left over     Talked about emergency planning in detail and what to do if the pump breaks / malfunctions                  Patient's most recent   is not meeting goal of <7.0  Lab Results   Component Value Date    A1C 7.8 07/30/2024    A1C 8.7 05/09/2024    A1C 9.8 03/29/2024    A1C 8.3 09/13/2023    A1C 8.1 08/30/2023    A1C 7.4 07/08/2021    A1C 8.0 03/26/2021    A1C 9.0 08/14/2020    A1C 7.8 05/18/2020    A1C 7.4 02/17/2020         Diabetes knowledge and skills assessment:   Patient is knowledgeable in diabetes management concepts related to: Healthy Eating, Being Active, Monitoring,  "Taking Medication, Problem Solving, Reducing Risks, and Healthy Coping  Continue education with the following diabetes management concepts: ongoing pump, sensor, medication review   Based on learning assessment above, most appropriate setting for further diabetes education would be: Individual setting.        PLAN  Continue with positive diet & lifestyle changes    Keep sensor on as much as able + bolus for meals as best as able   MDI back up plan - new Lantus & syringes   Follow up every 3-6 months with Diabetes ed & endocrinology    See Care Plan for co-developed, patient-state behavior change goals.  AVS provided for patient today.    SUBJECTIVE/OBJECTIVE:  Presents for: Insulin Pump Review  Accompanied by: Self  Diabetes education in the past 24mo: Yes  Focus of Visit: Insulin Pump  Type of Pump visit: Pre-pump  Diabetes type: Type 2  Disease course: Improving  How confident are you filling out medical forms by yourself:: Quite a bit  Other concerns:: None  Cultural Influences/Ethnic Background:  Not  or     Diabetes Symptoms & Complications:  Disease course: Improving  Nephropathy: Yes  Peripheral neuropathy: Yes    Patient Problem List and Family Medical History reviewed for relevant medical history, current medical status, and diabetes risk factors.    Vitals:  There were no vitals taken for this visit.  Estimated body mass index is 40.74 kg/m  as calculated from the following:    Height as of 8/2/24: 1.6 m (5' 3\").    Weight as of 8/2/24: 104.3 kg (230 lb).   Last 3 BP:   BP Readings from Last 3 Encounters:   08/03/24 (!) 153/62   08/01/24 124/68   07/25/24 (!) 170/60       History   Smoking Status    Former    Types: Cigarettes   Smokeless Tobacco    Never       Labs:  Lab Results   Component Value Date    A1C 7.8 07/30/2024    A1C 7.4 07/08/2021     Lab Results   Component Value Date    GLC 75 07/30/2024    GLC 75 07/30/2024     09/18/2023     12/19/2022     07/08/2021 " "    Lab Results   Component Value Date     07/30/2024     07/30/2024    LDL 72 03/26/2021     HDL Cholesterol   Date Value Ref Range Status   03/26/2021 57 >49 mg/dL Final     Direct Measure HDL   Date Value Ref Range Status   07/30/2024 45 (L) >=50 mg/dL Final   ]  GFR Estimate   Date Value Ref Range Status   07/30/2024 26 (L) >60 mL/min/1.73m2 Final     Comment:     eGFR calculated using 2021 CKD-EPI equation.   07/08/2021 52 (L) >60 mL/min/[1.73_m2] Final     Comment:     Non  GFR Calc  Starting 12/18/2018, serum creatinine based estimated GFR (eGFR) will be   calculated using the Chronic Kidney Disease Epidemiology Collaboration   (CKD-EPI) equation.       GFR Estimate If Black   Date Value Ref Range Status   07/08/2021 60 (L) >60 mL/min/[1.73_m2] Final     Comment:      GFR Calc  Starting 12/18/2018, serum creatinine based estimated GFR (eGFR) will be   calculated using the Chronic Kidney Disease Epidemiology Collaboration   (CKD-EPI) equation.       Lab Results   Component Value Date    CR 2.37 07/30/2024    CR 1.32 07/08/2021     No results found for: \"MICROALBUMIN\"    Healthy Eating:  Healthy Eating Assessed Today: Yes  Cultural/Jew diet restrictions?: No  Meal planning/habits: Carb counting  How many times a week on average do you eat food made away from home (restaurant/take-out)?: 3    Being Active:  Being Active Assessed Today: Yes  Barrier to exercise: Physical limitation    Monitoring:  Monitoring Assessed Today: Yes  Blood Glucose Meter: CGM  Times checking blood sugar at home (number): 5+  Times checking blood sugar at home (per): Day  Blood glucose trend: Other    Taking Medications:  Diabetes Medication(s)       Diabetic Other       glucose (BD GLUCOSE) 4 g chewable tablet Take 1 tablet by mouth every hour as needed for low blood sugar       Insulin       insulin aspart (NOVOLOG VIAL) 100 UNITS/ML vial for use with continuous insulin pump  Max TDD " 80            Current Treatments: Insulin Injections  Dose schedule: Pre-breakfast, Pre-lunch, Pre-dinner  Given by: Patient    Problem Solving:  Yes      Reducing Risks:  Reducing Risks Assessed Today: Yes  CAD Risks: Diabetes Mellitus, Family history, Hypertension, Stress  Has dilated eye exam at least once a year?: No  Sees dentist every 6 months?: No  Feet checked by healthcare provider in the last year?: No    Healthy Coping:  Healthy Coping Assessed Today: Yes  Emotional response to diabetes: Ready to learn  Informal Support system:: Family, Other, Brenda based  Stage of change: ACTION (Actively working towards change)  Patient Activation Measure Survey Score:      5/10/2018     1:00 PM   EMY Score (Last Two)   EMY Raw Score 35   Activation Score 72.1   EMY Level 3     Care Plan and Education Provided:  Healthy Eating: Balanced meals, Being Active: Relationship of activity to glucose, Monitoring: continuous glucose monitor use , Taking Medication: Action of prescribed medication(s), Problem Solving: High glucose - causes, signs/symptoms, treatment and prevention, Low glucose - causes, signs/symptoms, treatment and prevention, Rule of 15 and carrying a carbohydrate source at all times in case of low glucose, and Safe travel,   Dolly Riley RD, LD, [facility]  Diabetes Education      Time Spent: 45 minutes  Encounter Type: Individual    Any diabetes medication dose changes were made via the CDE Protocol per the patient's referring provider. A copy of this encounter was shared with the provider.

## 2024-08-06 NOTE — TELEPHONE ENCOUNTER
Patient has an appointment with Yamilka today, please have provider discuss coming in for fasting blood work.

## 2024-08-07 ENCOUNTER — TRANSFERRED RECORDS (OUTPATIENT)
Dept: HEALTH INFORMATION MANAGEMENT | Facility: CLINIC | Age: 39
End: 2024-08-07
Payer: COMMERCIAL

## 2024-08-07 LAB — RETINOPATHY: NEGATIVE

## 2024-08-09 NOTE — TELEPHONE ENCOUNTER
-- DO NOT REPLY / DO NOT REPLY ALL --  -- This inbox is not monitored. If this was sent to the wrong provider or department, reroute message to P [facility] Reroute pool. --  -- Message is from Engagement Center Operations (ECO) --      Message Type:  Medication Question or Concern    Medication Question:  MEDICATION WAS SENT TO [facility]IL [facility] INSTEAD OF [facility].  PLEASE RESEND.  Preferred pharmacy verified and selected.   [facility] [id number] [facility] - [place], [place] - [address]    Patient has been advised the message will be addressed within 2-3 business days.               Prior Authorization Retail Medication Request    Medication/Dose: pregablin  ICD code (if different than what is on RX):    Previously Tried and Failed:  neurontin 300 mg; lamictal 25, 100 mg; trileptal 300 mg; topamax 25, 50 mg  Rationale:      Insurance Name:  [phone]  Insurance ID:  [id number]      Pharmacy Information (if different than what is on RX)  Name:    Phone:

## 2024-08-13 NOTE — PROGRESS NOTES
CHIEF COMPLAINT:   Chief Complaint   Patient presents with    Left Thigh - Pain     Notes that she twisted the left knee when she was taking out the garbage, bending and twisting the leg. Notes stiffness with bending and stairs. Stairs are most difficult, but notes getting out of a seated position is difficult. Pain located over mid thigh.         SURGICAL PROCEDURE: left femur fracture IMN ([place])  DATE OF PROCEDURE: 1/22/2024      HISTORY OF PRESENT ILLNESS    Divina Delgadillo is a 39 year old female seen for acute left thigh and knee pain. Onset of pain 8/2/2024, when she twisted her leg taking out the garbage. Felt popping with pain in the left hip radiating down the thigh to the knee and lower leg. She was seen in the ED with xrays negative for acute fracture. Since onset, pain improving. Pain of the thigh and knee mostly now.    She is status post IMN left femur fracture 1/2024 while in [place]. We had followed her postoperative, and at her last visit 5/6/2024, was doing well.        Recall injury when she was in [place], on [place] sidewalk at the airport and her boot caught and went flying into the air and landed on her left side.  She was taken to a hospital in [place], noted to have a left femur fracture, and had an intramedullary nail placed.      She is diabetic, A1c=7.8 on 7/30/2024      Other PMH:  has a past medical history of Acquired asplenia, Acute pain of left knee (03/22/2019), Acute-on-chronic kidney injury  (H24) (03/22/2019), ARF (acute renal failure) (H24) (03/23/2019), Asthma, Bipolar disorder (H), Cardiac murmur, Cervical high risk HPV (human papillomavirus) test positive (06/20/2017), Chiari malformation type I (H), Chronic bilateral low back pain without sciatica, Deep venous thrombosis of arm (H) (01/06/2017), Depressive disorder, DVT (deep venous thrombosis) (H), DVT of upper extremity (deep vein thrombosis) (H) (2021), Esophageal reflux, Hypertension, Insomnia, Leukocytosis, Mild  intermittent asthma, Morbid obesity (H), Mucinous neoplasm of pancreas, NAFL (nonalcoholic fatty liver), Neck pain, Nicotine abuse, Other specified types of schizophrenia, unspecified condition, Schizoaffective disorder, depressive type (H), Stage 3a chronic kidney disease (CKD) (H), Type 2 diabetes mellitus (H), Ulnar neuropathy of both upper extremities, and Vitamin D insufficiency.    She has no past medical history of Arthritis, Cerebral infarction (H), Congestive heart failure (H), COPD (chronic obstructive pulmonary disease) (H), Heart disease, History of blood transfusion, or Thyroid disease.  Patient Active Problem List   Diagnosis    Esophageal reflux    NAFL (nonalcoholic fatty liver)    Essential hypertension    Hyperlipidemia LDL goal <100    Stage 3 chronic kidney disease    Mucinous neoplasm of pancreas    Type 2 diabetes mellitus with stage 3 chronic kidney disease, with long-term current use of insulin (H)    Bipolar disorder (H)    Cervical high risk HPV (human papillomavirus) test positive    IUD (intrauterine device) in place    Morbid obesity (H)    Mild intermittent asthma with acute exacerbation    Nicotine abuse    Chronic leukocytosis    Acquired asplenia    Borderline personality disorder (H)    PTSD (post-traumatic stress disorder)    Long term (current) use of anticoagulants    Orthostatic hypotension    Tobacco abuse    Vitamin D insufficiency    Depressive disorder    Schizoaffective disorder, depressive type (H)    Cardiac murmur    History of DVT of arm  (deep vein thrombosis)    Insomnia, unspecified type    Ulnar neuropathy of both upper extremities    Chiari malformation type I (H)    Deep venous thrombosis of arm (H)    Neutrophilic leukocytosis    Other fracture of left femur, initial encounter for closed fracture (H)    Peroneal tendinitis of left lower extremity    Acute left ankle pain    Avascular necrosis of bone of ankle (H)       Surgical Hx:  has a past surgical history  that includes ENT surgery (10/01/2000); tonsillectomy (10/01/2000); splenectomy (2015); Adrenalectomy (Left, 2015); Pancreatectomy partial (2015); Percutaneous biopsy liver (Right, 11/14/2019); IR Liver Biopsy Percutaneous (11/14/2019); biopsy; Breast surgery (2013); and colonoscopy.    Medications:   Current Outpatient Medications:     ACCU-CHEK GUIDE test strip, Use to test blood sugar 3 times daily or as directed., Disp: 150 strip, Rfl: 1    acetaminophen (TYLENOL) 325 MG tablet, Take 650 mg by mouth every 4 hours as needed for pain or fever 2 tab every 4-6 hours as needed, do not exceed 9 tabs in 24hours, Disp: , Rfl:     acetaminophen (TYLENOL) 500 MG tablet, Take 1-2 tablets (500-1,000 mg) by mouth every 8 hours as needed for mild pain, Disp: 30 tablet, Rfl: 0    albuterol (VENTOLIN HFA) 108 (90 Base) MCG/ACT inhaler, INHALE 2 PUFFS INTO THE LUNGS EVERY SIX  HOURS AS NEEDED FOR SHORTNESS OF BREATH, Disp: 18 g, Rfl: 10    alendronate (FOSAMAX) 70 MG tablet, Take 1 tablet (70 mg) by mouth every 7 days, Disp: 12 tablet, Rfl: 0    ARIPiprazole (ABILIFY) 30 MG tablet, Take 30 mg by mouth daily, Disp: , Rfl:     atorvastatin (LIPITOR) 20 MG tablet, Take 1 tablet (20 mg) by mouth daily, Disp: 90 tablet, Rfl: 3    Biotin (BIOTIN FORTE) 5 MG TABS, Take 1 tablet by mouth daily, Disp: 60 tablet, Rfl: 1    blood glucose (ACCU-CHEK GUIDE) test strip, Use to test blood sugar 3 times daily or as directed., Disp: 100 strip, Rfl: 11    bumetanide (BUMEX) 0.5 MG tablet, Take 1 tablet (0.5 mg) by mouth daily, Disp: 30 tablet, Rfl: 2    carvedilol (COREG) 12.5 MG tablet, Take 1 tablet (12.5 mg) by mouth 2 times daily, Disp: 60 tablet, Rfl: 11    cloZAPine (CLOZARIL) 50 MG tablet, Take 150 mg by mouth at bedtime, Disp: , Rfl:     diclofenac (VOLTAREN) 1 % topical gel, Apply 2 g topically 4 times daily, Disp: 150 g, Rfl: 1    docusate sodium (COLACE) 100 MG capsule, Take 1 capsule (100 mg) by mouth 2 times daily as needed for  "constipation, Disp: 60 capsule, Rfl: 1    FLUoxetine (PROZAC) 40 MG capsule, Take 40 mg by mouth daily, Disp: , Rfl:     glucose (BD GLUCOSE) 4 g chewable tablet, Take 1 tablet by mouth every hour as needed for low blood sugar, Disp: 30 tablet, Rfl: 4    hydrOXYzine HCl (ATARAX) 25 MG tablet, Take 25 mg by mouth 3 times daily as needed for anxiety, Disp: , Rfl:     insulin aspart (NOVOLOG VIAL) 100 UNITS/ML vial, for use with continuous insulin pump  Max TDD 80, Disp: 150 mL, Rfl: 1    Insulin Infusion Pump (MINIMED 780G INSULIN PUMP) KIT, 1 each daily SmartGuard Target: 100; Active Insulin Time: 2 hours (recommended); SmartGuardTM Warmup/Manual Mode: Suspend before low (recommended), Disp: 1 kit, Rfl: 0    Insulin Infusion Pump Supplies (EXTENDED INFUSION SET 23\"/6MM) MISC, 1 each every 7 days Max Total Daily Dose 70 units; Change infusion set & reservoir every 7 days (recommended), Disp: 10 each, Rfl: 6    Insulin Infusion Pump Supplies (EXTENDED RESERVOIR 3ML) MISC, 1 each every 7 days, Disp: 10 each, Rfl: 11    lamoTRIgine (LAMICTAL) 100 MG tablet, Take 100 mg by mouth At Bedtime, Disp: , Rfl:     lisinopril (ZESTRIL) 2.5 MG tablet, Take 1 tablet (2.5 mg) by mouth daily, Disp: 90 tablet, Rfl: 1    loratadine (CLARITIN) 10 MG tablet, Take 1 tablet (10 mg) by mouth every morning, Disp: 30 tablet, Rfl: 9    meclizine (ANTIVERT) 25 MG tablet, Take 1 tablet (25 mg) by mouth 3 times daily as needed for dizziness, Disp: 30 tablet, Rfl: 0    nitroGLYcerin 0.2% ointment, Apply 1 click (0.25 g) topically every 24 hours for 14 days, Disp: 4 g, Rfl: 0    omeprazole (PRILOSEC) 20 MG DR capsule, TAKE 1 CAPSULE BY MOUTH EVERY DAY, Disp: 90 capsule, Rfl: 2    oxyCODONE (ROXICODONE) 5 MG tablet, Take 1 tablet (5 mg) by mouth 2 times daily as needed for pain, Disp: 12 tablet, Rfl: 0    Suvorexant (BELSOMRA) 10 MG tablet, Take 10 mg by mouth at bedtime, Disp: , Rfl:     topiramate (TOPAMAX) 25 MG tablet, Take 1 tablet (25 mg) by " mouth at bedtime, Disp: 90 tablet, Rfl: 3    Allergies:   Allergies   Allergen Reactions    Acetaminophen Other (See Comments)     pt previously tried to overdose on it, PMD does not want pt taking per pt.        Social Hx:  reports that she quit smoking about 6 months ago. Her smoking use included cigarettes. She started smoking about 8 months ago. She has a 0.1 pack-year smoking history. She has been exposed to tobacco smoke. She has never used smokeless tobacco. She reports that she does not drink alcohol and does not use drugs.    Family Hx: family history includes Allergies in her brother, mother, sister, and sister; Anxiety Disorder in her father and mother; Asthma in her mother; Chronic Obstructive Pulmonary Disease in her mother; Depression in her father, mother, sister, and sister; Diabetes in her father; Heart Disease in her father; Hypertension in her father; Mental Illness in her father and mother; Obesity in her father; Respiratory in her brother, mother, sister, and sister; Sleep Apnea in her maternal grandfather.    REVIEW OF SYSTEMS:  CONSTITUTIONAL:NEGATIVE for fever, chills, change in weight  INTEGUMENTARY/SKIN: NEGATIVE for worrisome rashes, moles or lesions  MUSCULOSKELETAL:See HPI above  NEURO: NEGATIVE for weakness, dizziness or paresthesias      PHYSICAL EXAM:  There were no vitals taken for this visit.   GENERAL APPEARANCE: healthy, alert, no distress  ; accompanied by her mother.  SKIN: no suspicious lesions or rashes  NEURO: Normal strength and tone, mentation intact and speech normal  PSYCH:  mentation appears normal and affect normal  RESPIRATORY: No increased work of breathing.          left LOWER EXTREMITY EXAM:  Gait: tberg, slight favors the left.  Intact sensation deep peroneal nerve, superficial peroneal nerve, med/lat tibial nerve, sural nerve, saphenous nerve  Intact EHL, EDL, TA, FHL, GS, quadriceps hamstrings and hip flexors  Mild left quadriceps weakness.  calf soft and nttp or  squeeze.  Edema: 1+    Wound / Incision: multiple surgical wounds healed well. Dry. No drainage. no erythema.  Inspection; slight swelling of the entire left lower extremity compared to the right.  Palpation: minimal over the quads, iliotibial band, quadriceps tendon, patella, medial and lateral patello-femoral retinaculum.  Tender to palpation medial and lateral joint line  No effusion  Positive patello-femoral crepitus and grind tests.    Strength: grossly intact, weak.   No discomfort with hip range of motion.      X-RAY:  2 views left femur from 8/15/2024   were reviewed in clinic today. Status post intramedullary ulysses and screw fixation across the proximal femoral diaphysis fracture. No hardware complication. Interval healing with increased osseous bridging. Otherwise unchanged.    8/2/2024: left tibia/fibula, Pelvis and left hip, left knee --  ORIF of the left femur. Exuberant callus formation about the chronic left proximal femoral fracture. A femoral fracture line remains visible. No prior studies available for comparison. Follow-up recommended. No other evidence for acute fracture or dislocation in the pelvis, left hip, left knee, or left tibia/fibula.Degenerative changes of the left knee noted.              Impression: 39 year old female 7months status post IMN left femoral shaft fracture, with acute left lower extremity pain.      Plan:   Images reviewed. Fracture  healed.  Unclear what she felt, could have been the knee, patello-femoral instability, osteoarthritis, ..  Nothing shows up on images to explain symptoms  Fracture well healed    Weight bearing status: weight bearing as tolerated    Pain control: over the counter as needed.  Immobilzation: none.  Continue with  home exercise program. Recommend working on stretching quadriceps as they are tight, sore.  Elevation of affected extremity as needed.  Return to clinic as needed.  Workability: ok to return to work without restrictions.    * All  questions were addressed and answered prior to discharge from clinic today. The patient acknowledges an understanding of and agreement with the plan set forth during today's visit. Patient was advised to call our office or MyChart us if any further questions arise upon leaving our office today.        Jimmie Nino M.D., M.S.  Dept. of Orthopaedic Surgery  [facility]

## 2024-08-15 ENCOUNTER — ANCILLARY PROCEDURE (OUTPATIENT)
Dept: GENERAL RADIOLOGY | Facility: CLINIC | Age: 39
End: 2024-08-15
Attending: ORTHOPAEDIC SURGERY
Payer: COMMERCIAL

## 2024-08-15 ENCOUNTER — OFFICE VISIT (OUTPATIENT)
Dept: ORTHOPEDICS | Facility: CLINIC | Age: 39
End: 2024-08-15
Attending: EMERGENCY MEDICINE
Payer: COMMERCIAL

## 2024-08-15 ENCOUNTER — THERAPY VISIT (OUTPATIENT)
Dept: SLEEP MEDICINE | Facility: CLINIC | Age: 39
End: 2024-08-15
Payer: COMMERCIAL

## 2024-08-15 DIAGNOSIS — G25.81 RESTLESS LEGS SYNDROME (RLS): ICD-10-CM

## 2024-08-15 DIAGNOSIS — E66.9 OBESITY (BMI 30-39.9): ICD-10-CM

## 2024-08-15 DIAGNOSIS — M79.652 PAIN OF LEFT THIGH: ICD-10-CM

## 2024-08-15 DIAGNOSIS — F51.04 PSYCHOPHYSIOLOGICAL INSOMNIA: ICD-10-CM

## 2024-08-15 DIAGNOSIS — S72.302D CLOSED FRACTURE OF SHAFT OF LEFT FEMUR WITH ROUTINE HEALING, SUBSEQUENT ENCOUNTER: Primary | ICD-10-CM

## 2024-08-15 DIAGNOSIS — S72.302D CLOSED FRACTURE OF SHAFT OF LEFT FEMUR WITH ROUTINE HEALING, SUBSEQUENT ENCOUNTER: ICD-10-CM

## 2024-08-15 DIAGNOSIS — R06.83 SNORING: ICD-10-CM

## 2024-08-15 PROCEDURE — 95810 POLYSOM 6/> YRS 4/> PARAM: CPT | Performed by: INTERNAL MEDICINE

## 2024-08-15 PROCEDURE — 73552 X-RAY EXAM OF FEMUR 2/>: CPT | Mod: TC | Performed by: RADIOLOGY

## 2024-08-15 PROCEDURE — 99213 OFFICE O/P EST LOW 20 MIN: CPT | Performed by: ORTHOPAEDIC SURGERY

## 2024-08-15 NOTE — LETTER
8/15/2024      Divina Delgadillo  [address]  [facility] [address]      Dear Colleague,    Thank you for referring your patient, Divina Delgadillo, to the [facility]. Please see a copy of my visit note below.    CHIEF COMPLAINT:   Chief Complaint   Patient presents with     Left Thigh - Pain     Notes that she twisted the left knee when she was taking out the garbage, bending and twisting the leg. Notes stiffness with bending and stairs. Stairs are most difficult, but notes getting out of a seated position is difficult. Pain located over mid thigh.         SURGICAL PROCEDURE: left femur fracture IMN ([place])  DATE OF PROCEDURE: 1/22/2024      HISTORY OF PRESENT ILLNESS    Divina Delgadillo is a 39 year old female seen for acute left thigh and knee pain. Onset of pain 8/2/2024, when she twisted her leg taking out the garbage. Felt popping with pain in the left hip radiating down the thigh to the knee and lower leg. She was seen in the ED with xrays negative for acute fracture. Since onset, pain improving. Pain of the thigh and knee mostly now.    She is status post IMN left femur fracture 1/2024 while in [place]. We had followed her postoperative, and at her last visit 5/6/2024, was doing well.        Recall injury when she was in [place], [place] sidewalk at the airport and her boot caught and went flying into the air and landed on her left side.  She was taken to a hospital in [place], noted to have a left femur fracture, and had an intramedullary nail placed.      She is diabetic, A1c=7.8 on 7/30/2024      Other PMH:  has a past medical history of Acquired asplenia, Acute pain of left knee (03/22/2019), Acute-on-chronic kidney injury  (H24) (03/22/2019), ARF (acute renal failure) (H24) (03/23/2019), Asthma, Bipolar disorder (H), Cardiac murmur, Cervical high risk HPV (human papillomavirus) test positive (06/20/2017), Chiari malformation type I (H), Chronic  bilateral low back pain without sciatica, Deep venous thrombosis of arm (H) (01/06/2017), Depressive disorder, DVT (deep venous thrombosis) (H), DVT of upper extremity (deep vein thrombosis) (H) (2021), Esophageal reflux, Hypertension, Insomnia, Leukocytosis, Mild intermittent asthma, Morbid obesity (H), Mucinous neoplasm of pancreas, NAFL (nonalcoholic fatty liver), Neck pain, Nicotine abuse, Other specified types of schizophrenia, unspecified condition, Schizoaffective disorder, depressive type (H), Stage 3a chronic kidney disease (CKD) (H), Type 2 diabetes mellitus (H), Ulnar neuropathy of both upper extremities, and Vitamin D insufficiency.    She has no past medical history of Arthritis, Cerebral infarction (H), Congestive heart failure (H), COPD (chronic obstructive pulmonary disease) (H), Heart disease, History of blood transfusion, or Thyroid disease.  Patient Active Problem List   Diagnosis     Esophageal reflux     NAFL (nonalcoholic fatty liver)     Essential hypertension     Hyperlipidemia LDL goal <100     Stage 3 chronic kidney disease     Mucinous neoplasm of pancreas     Type 2 diabetes mellitus with stage 3 chronic kidney disease, with long-term current use of insulin (H)     Bipolar disorder (H)     Cervical high risk HPV (human papillomavirus) test positive     IUD (intrauterine device) in place     Morbid obesity (H)     Mild intermittent asthma with acute exacerbation     Nicotine abuse     Chronic leukocytosis     Acquired asplenia     Borderline personality disorder (H)     PTSD (post-traumatic stress disorder)     Long term (current) use of anticoagulants     Orthostatic hypotension     Tobacco abuse     Vitamin D insufficiency     Depressive disorder     Schizoaffective disorder, depressive type (H)     Cardiac murmur     History of DVT of arm  (deep vein thrombosis)     Insomnia, unspecified type     Ulnar neuropathy of both upper extremities     Chiari malformation type I (H)     Deep  venous thrombosis of arm (H)     Neutrophilic leukocytosis     Other fracture of left femur, initial encounter for closed fracture (H)     Peroneal tendinitis of left lower extremity     Acute left ankle pain     Avascular necrosis of bone of ankle (H)       Surgical Hx:  has a past surgical history that includes ENT surgery (10/01/2000); tonsillectomy (10/01/2000); splenectomy (2015); Adrenalectomy (Left, 2015); Pancreatectomy partial (2015); Percutaneous biopsy liver (Right, 11/14/2019); IR Liver Biopsy Percutaneous (11/14/2019); biopsy; Breast surgery (2013); and colonoscopy.    Medications:   Current Outpatient Medications:      ACCU-CHEK GUIDE test strip, Use to test blood sugar 3 times daily or as directed., Disp: 150 strip, Rfl: 1     acetaminophen (TYLENOL) 325 MG tablet, Take 650 mg by mouth every 4 hours as needed for pain or fever 2 tab every 4-6 hours as needed, do not exceed 9 tabs in 24hours, Disp: , Rfl:      acetaminophen (TYLENOL) 500 MG tablet, Take 1-2 tablets (500-1,000 mg) by mouth every 8 hours as needed for mild pain, Disp: 30 tablet, Rfl: 0     albuterol (VENTOLIN HFA) 108 (90 Base) MCG/ACT inhaler, INHALE 2 PUFFS INTO THE LUNGS EVERY SIX  HOURS AS NEEDED FOR SHORTNESS OF BREATH, Disp: 18 g, Rfl: 10     alendronate (FOSAMAX) 70 MG tablet, Take 1 tablet (70 mg) by mouth every 7 days, Disp: 12 tablet, Rfl: 0     ARIPiprazole (ABILIFY) 30 MG tablet, Take 30 mg by mouth daily, Disp: , Rfl:      atorvastatin (LIPITOR) 20 MG tablet, Take 1 tablet (20 mg) by mouth daily, Disp: 90 tablet, Rfl: 3     Biotin (BIOTIN FORTE) 5 MG TABS, Take 1 tablet by mouth daily, Disp: 60 tablet, Rfl: 1     blood glucose (ACCU-CHEK GUIDE) test strip, Use to test blood sugar 3 times daily or as directed., Disp: 100 strip, Rfl: 11     bumetanide (BUMEX) 0.5 MG tablet, Take 1 tablet (0.5 mg) by mouth daily, Disp: 30 tablet, Rfl: 2     carvedilol (COREG) 12.5 MG tablet, Take 1 tablet (12.5 mg) by mouth 2 times daily, Disp:  "60 tablet, Rfl: 11     cloZAPine (CLOZARIL) 50 MG tablet, Take 150 mg by mouth at bedtime, Disp: , Rfl:      diclofenac (VOLTAREN) 1 % topical gel, Apply 2 g topically 4 times daily, Disp: 150 g, Rfl: 1     docusate sodium (COLACE) 100 MG capsule, Take 1 capsule (100 mg) by mouth 2 times daily as needed for constipation, Disp: 60 capsule, Rfl: 1     FLUoxetine (PROZAC) 40 MG capsule, Take 40 mg by mouth daily, Disp: , Rfl:      glucose (BD GLUCOSE) 4 g chewable tablet, Take 1 tablet by mouth every hour as needed for low blood sugar, Disp: 30 tablet, Rfl: 4     hydrOXYzine HCl (ATARAX) 25 MG tablet, Take 25 mg by mouth 3 times daily as needed for anxiety, Disp: , Rfl:      insulin aspart (NOVOLOG VIAL) 100 UNITS/ML vial, for use with continuous insulin pump  Max TDD 80, Disp: 150 mL, Rfl: 1     Insulin Infusion Pump (MINIMED 780G INSULIN PUMP) KIT, 1 each daily SmartGuard Target: 100; Active Insulin Time: 2 hours (recommended); SmartGuardTM Warmup/Manual Mode: Suspend before low (recommended), Disp: 1 kit, Rfl: 0     Insulin Infusion Pump Supplies (EXTENDED INFUSION SET 23\"/6MM) MISC, 1 each every 7 days Max Total Daily Dose 70 units; Change infusion set & reservoir every 7 days (recommended), Disp: 10 each, Rfl: 6     Insulin Infusion Pump Supplies (EXTENDED RESERVOIR 3ML) MISC, 1 each every 7 days, Disp: 10 each, Rfl: 11     lamoTRIgine (LAMICTAL) 100 MG tablet, Take 100 mg by mouth At Bedtime, Disp: , Rfl:      lisinopril (ZESTRIL) 2.5 MG tablet, Take 1 tablet (2.5 mg) by mouth daily, Disp: 90 tablet, Rfl: 1     loratadine (CLARITIN) 10 MG tablet, Take 1 tablet (10 mg) by mouth every morning, Disp: 30 tablet, Rfl: 9     meclizine (ANTIVERT) 25 MG tablet, Take 1 tablet (25 mg) by mouth 3 times daily as needed for dizziness, Disp: 30 tablet, Rfl: 0     nitroGLYcerin 0.2% ointment, Apply 1 click (0.25 g) topically every 24 hours for 14 days, Disp: 4 g, Rfl: 0     omeprazole (PRILOSEC) 20 MG DR capsule, TAKE 1 CAPSULE " BY MOUTH EVERY DAY, Disp: 90 capsule, Rfl: 2     oxyCODONE (ROXICODONE) 5 MG tablet, Take 1 tablet (5 mg) by mouth 2 times daily as needed for pain, Disp: 12 tablet, Rfl: 0     Suvorexant (BELSOMRA) 10 MG tablet, Take 10 mg by mouth at bedtime, Disp: , Rfl:      topiramate (TOPAMAX) 25 MG tablet, Take 1 tablet (25 mg) by mouth at bedtime, Disp: 90 tablet, Rfl: 3    Allergies:   Allergies   Allergen Reactions     Acetaminophen Other (See Comments)     pt previously tried to overdose on it, PMD does not want pt taking per pt.        Social Hx:  reports that she quit smoking about 6 months ago. Her smoking use included cigarettes. She started smoking about 8 months ago. She has a 0.1 pack-year smoking history. She has been exposed to tobacco smoke. She has never used smokeless tobacco. She reports that she does not drink alcohol and does not use drugs.    Family Hx: family history includes Allergies in her brother, mother, sister, and sister; Anxiety Disorder in her father and mother; Asthma in her mother; Chronic Obstructive Pulmonary Disease in her mother; Depression in her father, mother, sister, and sister; Diabetes in her father; Heart Disease in her father; Hypertension in her father; Mental Illness in her father and mother; Obesity in her father; Respiratory in her brother, mother, sister, and sister; Sleep Apnea in her maternal grandfather.    REVIEW OF SYSTEMS:  CONSTITUTIONAL:NEGATIVE for fever, chills, change in weight  INTEGUMENTARY/SKIN: NEGATIVE for worrisome rashes, moles or lesions  MUSCULOSKELETAL:See HPI above  NEURO: NEGATIVE for weakness, dizziness or paresthesias      PHYSICAL EXAM:  There were no vitals taken for this visit.   GENERAL APPEARANCE: healthy, alert, no distress  ; accompanied by her mother.  SKIN: no suspicious lesions or rashes  NEURO: Normal strength and tone, mentation intact and speech normal  PSYCH:  mentation appears normal and affect normal  RESPIRATORY: No increased work of  breathing.          left LOWER EXTREMITY EXAM:  Gait: tberg, slight favors the left.  Intact sensation deep peroneal nerve, superficial peroneal nerve, med/lat tibial nerve, sural nerve, saphenous nerve  Intact EHL, EDL, TA, FHL, GS, quadriceps hamstrings and hip flexors  Mild left quadriceps weakness.  calf soft and nttp or squeeze.  Edema: 1+    Wound / Incision: multiple surgical wounds healed well. Dry. No drainage. no erythema.  Inspection; slight swelling of the entire left lower extremity compared to the right.  Palpation: minimal over the quads, iliotibial band, quadriceps tendon, patella, medial and lateral patello-femoral retinaculum.  Tender to palpation medial and lateral joint line  No effusion  Positive patello-femoral crepitus and grind tests.    Strength: grossly intact, weak.   No discomfort with hip range of motion.      X-RAY:  2 views left femur from 8/15/2024   were reviewed in clinic today. Status post intramedullary ulysses and screw fixation across the proximal femoral diaphysis fracture. No hardware complication. Interval healing with increased osseous bridging. Otherwise unchanged.    8/2/2024: left tibia/fibula, Pelvis and left hip, left knee --  ORIF of the left femur. Exuberant callus formation about the chronic left proximal femoral fracture. A femoral fracture line remains visible. No prior studies available for comparison. Follow-up recommended. No other evidence for acute fracture or dislocation in the pelvis, left hip, left knee, or left tibia/fibula.Degenerative changes of the left knee noted.              Impression: 39 year old female 7months status post IMN left femoral shaft fracture, with acute left lower extremity pain.      Plan:   Images reviewed. Fracture  healed.  Unclear what she felt, could have been the knee, patello-femoral instability, osteoarthritis, ..  Nothing shows up on images to explain symptoms  Fracture well healed    Weight bearing status: weight bearing as  tolerated    Pain control: over the counter as needed.  Immobilzation: none.  Continue with  home exercise program. Recommend working on stretching quadriceps as they are tight, sore.  Elevation of affected extremity as needed.  Return to clinic as needed.  Workability: ok to return to work without restrictions.    * All questions were addressed and answered prior to discharge from clinic today. The patient acknowledges an understanding of and agreement with the plan set forth during today's visit. Patient was advised to call our office or MyChart us if any further questions arise upon leaving our office today.        Jimmie Nino M.D., M.S.  Dept. of Orthopaedic Surgery  [facility]             Again, thank you for allowing me to participate in the care of your patient.        Sincerely,        Jimmie Nino MD

## 2024-08-16 NOTE — PATIENT INSTRUCTIONS
[facility]    1. Your sleep study will be reviewed by a sleep physician within the next few days.     2. Please follow up in the sleep clinic as scheduled, or, make an appointment with your sleep provider to be seen within two weeks to discuss the results of the sleep study.    3. If you have any questions or problems with your treatment plan, please contact your sleep clinic provider at [phone] to further manage your condition.    4. Please review your attached medication list, and, at your follow-up appointment advise your sleep clinic provider about any changes.    5. Go to [email or web address] for more information about your sleep problems.    ANANT FRANCIS, RPSGT  August 16, 2024

## 2024-08-20 ENCOUNTER — LAB (OUTPATIENT)
Dept: LAB | Facility: CLINIC | Age: 39
End: 2024-08-20
Payer: COMMERCIAL

## 2024-08-20 DIAGNOSIS — F25.0 SCHIZOAFFECTIVE DISORDER, BIPOLAR TYPE (H): ICD-10-CM

## 2024-08-20 LAB
BASOPHILS # BLD AUTO: 0.1 10E3/UL (ref 0–0.2)
BASOPHILS NFR BLD AUTO: 1 %
EOSINOPHIL # BLD AUTO: 0.3 10E3/UL (ref 0–0.7)
EOSINOPHIL NFR BLD AUTO: 2 %
ERYTHROCYTE [DISTWIDTH] IN BLOOD BY AUTOMATED COUNT: 17 % (ref 10–15)
HCT VFR BLD AUTO: 36.6 % (ref 35–47)
HGB BLD-MCNC: 11 G/DL (ref 11.7–15.7)
IMM GRANULOCYTES # BLD: 0.1 10E3/UL
IMM GRANULOCYTES NFR BLD: 1 %
LYMPHOCYTES # BLD AUTO: 4.8 10E3/UL (ref 0.8–5.3)
LYMPHOCYTES NFR BLD AUTO: 31 %
MCH RBC QN AUTO: 28.6 PG (ref 26.5–33)
MCHC RBC AUTO-ENTMCNC: 30.1 G/DL (ref 31.5–36.5)
MCV RBC AUTO: 95 FL (ref 78–100)
MONOCYTES # BLD AUTO: 0.7 10E3/UL (ref 0–1.3)
MONOCYTES NFR BLD AUTO: 4 %
NEUTROPHILS # BLD AUTO: 9.4 10E3/UL (ref 1.6–8.3)
NEUTROPHILS NFR BLD AUTO: 61 %
PLATELET # BLD AUTO: 347 10E3/UL (ref 150–450)
RBC # BLD AUTO: 3.85 10E6/UL (ref 3.8–5.2)
WBC # BLD AUTO: 15.4 10E3/UL (ref 4–11)

## 2024-08-20 PROCEDURE — 85025 COMPLETE CBC W/AUTO DIFF WBC: CPT

## 2024-08-20 PROCEDURE — 36415 COLL VENOUS BLD VENIPUNCTURE: CPT

## 2024-08-23 NOTE — PROCEDURES
" SLEEP STUDY INTERPRETATION  DIAGNOSTIC POLYSOMNOGRAPHY REPORT      Patient: MAURICIO HWANG  YOB: 1985  Study Date: 8/15/2024  MRN: [id number]  Referring Provider: Shital Rodriguez DO  Ordering Provider: Francia Escobar APRN CNP    Indications for Polysomnography: The patient is a 39 year old Female who is 5' 3\" and weighs 225.0 lbs. Her BMI is 39.4, [place] sleepiness scale 10 and neck circumference is 40 cm. A diagnostic polysomnogram was performed to evaluate for sleep apnea.    Polysomnogram Data: A full night polysomnogram recorded the standard physiologic parameters including EEG, EOG, EMG, ECG, nasal and oral airflow. Respiratory parameters of chest and abdominal movements were recorded with respiratory inductance plethysmography. Oxygen saturation was recorded by pulse oximetry. Hypopnea scoring rule used: 1B 4%.    Sleep Architecture: The total recording time of the polysomnogram was 463.1 minutes. The total sleep time was 379.0 minutes. Sleep latency was decreased at 2.6 minutes without the use of a sleep aid. REM latency was - minutes. Arousal index was normal at 14.9 arousals per hour. Sleep efficiency was decreased at 81.8%. Wake after sleep onset was 81.0 minutes. The patient spent 7.5% of total sleep time in Stage N1, 68.9% in Stage N2, 23.6% in Stage N3, and 0.0% in REM. Time in REM supine was - minutes.    Respiration: Events ? The polysomnogram revealed a presence of - obstructive, 1 central, and - mixed apneas resulting in an apnea index of 0.2 events per hour. There were 132 obstructive hypopneas and 3 central hypopneas resulting in an obstructive hypopnea index of 20.9 and central hypopnea index of 0.5 events per hour. The combined apnea/hypopnea index was 21.5 events per hour (central apnea/hypopnea index was 0.6 events per hour). The REM AHI was - events per hour. The supine AHI was 16.3 events per hour. The RERA index was - events per hour.  The RDI was 21.5 events per " hour.  Snoring - was reported as loud.  Respiratory rate and pattern - was notable for normal respiratory rate and pattern.  Sustained Sleep Associated Hypoventilation - Transcutaneous carbon dioxide monitoring was not used; however significant hypoventilation was not suggested by oximetry.  Sleep Associated Hypoxemia - (Greater than 5 minutes O2 sat at or below 88%) was present. Baseline oxygen saturation was 91.9%. Lowest oxygen saturation was 82.0%. Time spent less than or equal to 88% was 18.8 minutes. Time spent less than or equal to 89% was 61.5 minutes.    Movement Activity: Negative for movement abnormalities.   Periodic Limb Activity - There were - PLMs during the entire study. The PLM index was - movements per hour. The PLM Arousal Index was - per hour.  REM EMG Activity - REM was not present.  Nocturnal Behavior - Abnormal sleep related behaviors were/were not noted during/arising out of NREM sleep.   Bruxism - None apparent.    Cardiac Summary: Sinus rhythm.   The average pulse rate was 71.6 bpm. The minimum pulse rate was 65.0 bpm while the maximum pulse rate was 80.0 bpm.  Arrhythmias were not noted.    Assessment:   Moderate degree of obstructive sleep apnea with associated oxygen desaturations and sleep fragmentation. REM was absent, which limits comprehensiveness of testing.     Recommendations:  CPAP therapy is the treatment of choice for moderate obstructive sleep apnea. Treatment can be initiated with auto titrating CPAP therapy with a pressure range of 5 to 15 cm H2O.  Recommend appropriate clinical follow-up in 1-3 months after starting treatment to monitor response, compliance and CPAP download data.  If CPAP therapy is not tolerated or accepted, patient can consider oral appliance therapy through referral to dental sleep medicine.   If devices are not accepted, patient can consider evaluation of surgical options through referral to specialized ENT surgery.    Suggest optimizing sleep hygiene  and avoiding sleep deprivation.  Weight management.    Diagnostic Codes:   Obstructive Sleep Apnea G47.33  Sleep Hypoxemia G47.36     8/15/2024 [place] [facility] Study (225.0 lbs) - AHI 21.5, RDI 21.5, Supine AHI 16.3, REM AHI -, Low O2 82.0%, Time Spent ?88% 18.8 minutes / Time Spent ?89% 61.5 minutes.     _____________________________________   Electronically Signed By: Johann Carter MD 08/23/2024

## 2024-08-24 LAB — SLPCOMP: NORMAL

## 2024-08-26 ENCOUNTER — VIRTUAL VISIT (OUTPATIENT)
Dept: SLEEP MEDICINE | Facility: CLINIC | Age: 39
End: 2024-08-26
Payer: COMMERCIAL

## 2024-08-26 VITALS — HEIGHT: 64 IN | WEIGHT: 230 LBS | BODY MASS INDEX: 39.27 KG/M2

## 2024-08-26 DIAGNOSIS — G47.33 OSA (OBSTRUCTIVE SLEEP APNEA): Primary | ICD-10-CM

## 2024-08-26 PROCEDURE — 99213 OFFICE O/P EST LOW 20 MIN: CPT | Mod: 95

## 2024-08-26 ASSESSMENT — SLEEP AND FATIGUE QUESTIONNAIRES
HOW LIKELY ARE YOU TO NOD OFF OR FALL ASLEEP WHILE SITTING QUIETLY AFTER LUNCH WITHOUT ALCOHOL: HIGH CHANCE OF DOZING
HOW LIKELY ARE YOU TO NOD OFF OR FALL ASLEEP WHEN YOU ARE A PASSENGER IN A CAR FOR AN HOUR WITHOUT A BREAK: WOULD NEVER DOZE
HOW LIKELY ARE YOU TO NOD OFF OR FALL ASLEEP IN A CAR, WHILE STOPPED FOR A FEW MINUTES IN TRAFFIC: WOULD NEVER DOZE
HOW LIKELY ARE YOU TO NOD OFF OR FALL ASLEEP WHILE LYING DOWN TO REST IN THE AFTERNOON WHEN CIRCUMSTANCES PERMIT: HIGH CHANCE OF DOZING
HOW LIKELY ARE YOU TO NOD OFF OR FALL ASLEEP WHILE SITTING INACTIVE IN A PUBLIC PLACE: WOULD NEVER DOZE
HOW LIKELY ARE YOU TO NOD OFF OR FALL ASLEEP WHILE SITTING AND READING: MODERATE CHANCE OF DOZING
HOW LIKELY ARE YOU TO NOD OFF OR FALL ASLEEP WHILE SITTING AND TALKING TO SOMEONE: WOULD NEVER DOZE
HOW LIKELY ARE YOU TO NOD OFF OR FALL ASLEEP WHILE WATCHING TV: MODERATE CHANCE OF DOZING

## 2024-08-26 ASSESSMENT — PAIN SCALES - GENERAL: PAINLEVEL: MILD PAIN (2)

## 2024-08-26 NOTE — PROGRESS NOTES
Virtual Visit Details  Type of service: Video Visit   Video Start Time: 2:49 PM  Video End Time: 2:58 PM  Originating Location (pt. Location): Home  Distant Location (provider location): On-site  Platform used for Video Visit: [facility]    Sleep Study Follow-Up Visit:    Date on this visit: 8/26/2024    Divina Delgadillo comes in today for follow-up of her sleep study done on 08/15/2024 at the [facility] for insomnia, snoring, and restless legs.          These findings were reviewed with patient.     Past medical/surgical history, family history, social history, medications and allergies were reviewed.      Problem List:  Patient Active Problem List    Diagnosis Date Noted    Peroneal tendinitis of left lower extremity 06/14/2024     Priority: Medium    Acute left ankle pain 06/14/2024     Priority: Medium    Avascular necrosis of bone of ankle (H) 06/14/2024     Priority: Medium    Other fracture of left femur, initial encounter for closed fracture (H) 01/21/2024     Priority: Medium    Chiari malformation type I (H) 06/12/2023     Priority: Medium    Ulnar neuropathy of both upper extremities 09/20/2022     Priority: Medium    Insomnia, unspecified type 08/30/2021     Priority: Medium    History of DVT of arm  (deep vein thrombosis) 01/23/2021     Priority: Medium     ultrasound 1/6/2017-  venous thrombus in the antecubital fossa region and the left forearm as described      Schizoaffective disorder, depressive type (H) 07/19/2019     Priority: Medium    Acquired asplenia 03/22/2019     Priority: Medium    Chronic leukocytosis 11/27/2018     Priority: Medium     Due to spleen removal      Nicotine abuse 08/13/2018     Priority: Medium    Mild intermittent asthma with acute exacerbation 01/16/2018     Priority: Medium    Morbid obesity (H) 01/09/2018     Priority: Medium    IUD (intrauterine device) in place 07/26/2017     Priority: Medium     Mirena IUD inserted in office with premed  7/26/2017        Cervical high risk HPV (human papillomavirus) test positive 06/20/2017     Priority: Medium     6/15/17 NIL, +HR HPV (not 16/18). Plan cotest in 1 year  6/14/18 NIL pap, neg HPV. Plan cotest in 3 years  3/26/21 NIL pap, Neg HPV. Plan cotest in 3 years.   4/24/24 NIL pap, +HR HPV (not 16/18). 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, splenectomy, left adrenalectomy 02/26/2015;      Type 2 diabetes mellitus with stage 3 chronic kidney disease, with long-term current use of insulin (H) 02/27/2017     Priority: Medium    Bipolar disorder (H) 02/27/2017     Priority: Medium    Orthostatic hypotension 01/10/2017     Priority: Medium    Tobacco abuse 01/10/2017     Priority: Medium    Long term (current) use of anticoagulants 01/09/2017     Priority: Medium    Deep venous thrombosis of arm (H) 01/09/2017     Priority: Medium    Stage 3 chronic kidney disease 12/03/2015     Priority: Medium     Overview:   Updated per 10/1/17 IMO import      Neutrophilic leukocytosis 08/28/2015     Priority: Medium    Vitamin D insufficiency 06/01/2015     Priority: Medium    Cardiac murmur 08/20/2013     Priority: Medium    Depressive disorder 09/15/2012     Priority: Medium    Hyperlipidemia LDL goal <100 10/31/2010     Priority: Medium    Borderline personality disorder (H) 11/06/2009     Priority: Medium    Essential hypertension 06/13/2008     Priority: Medium    NAFL (nonalcoholic fatty liver) 04/11/2006     Priority: Medium     See flowsheet for hepatic panel.   Stopped zocor, was on godon as well had right upper quandrant ultrasound and ct  ? Psych med related vs SAHNI      Esophageal reflux 04/14/2005     Priority: Medium     GERD      PTSD (post-traumatic stress disorder) 01/01/2001     Priority: Medium         Impression/Plan:  (G47.33) ROMERO (obstructive sleep apnea) (primary encounter diagnosis)  Comment: Divina presents to the sleep clinic to review the results of her sleep study done on 08/15/2024. The results of her sleep study were reviewed in depth. Informed Divina she has moderate obstructive sleep apnea with sleep associated hypoxemia. Emphasized that this is likely an underestimation of her apnea given she did not have any REM sleep during this study. We reviewed treatment options for obstructive sleep apnea including PAP therapy or a mandibular advancement device. Divina has full dentures so she would like to try CPAP.   Plan: Comprehensive DME  An order was placed for Auto-PAP 5-15 cmH2O. We reviewed compliance goals, mask exchange policy, and patient was enrolled in [facility].     She will follow up with me in about 3 month(s) to document compliance.     Total time spent reviewing medical records, history and physical examination, review of previous testing and interpretation as well as documentation on this date: 18 minutes     VERONIQUE Guadalupe CNP    CC: Jaleesa Velasquez

## 2024-08-26 NOTE — NURSING NOTE
Current patient location: [address] [place] [id number]    Is the patient currently in the state of [place]? YES    Visit mode:VIDEO    If the visit is dropped, the patient can be reconnected by: VIDEO VISIT: Text to cell phone:   Telephone Information:   Mobile [phone]       Will anyone else be joining the visit? NO  (If patient encounters technical issues they should call [phone]    How would you like to obtain your AVS? MyChart    Are changes needed to the allergy or medication list? No    Are refills needed on medications prescribed by this physician? NO    Rooming Documentation:  Questionnaire(s) completed      Reason for visit: HENRY TREVNIOF

## 2024-08-26 NOTE — PATIENT INSTRUCTIONS
I placed the order for a CPAP machine. That routes electronically to [facility]. They will contact you in the next 2-3 weeks to schedule an appointment to get the device.     A few things to know about starting CPAP:  CPAP machines are often rent-to-own over 3-12 months depending on your insurance. During the first 3 months, insurances will often want to see at least 4 hours of use on 70% of nights over a 30 day period. Ideally, you would wear it whenever sleeping, but 4 hours is where health benefits really start.   If you don't like the first mask you get, you can exchange it once in the first month for free. Otherwise, insurance will cover new supplies about every 3 months. They will give you paperwork explaining how often to clean and replace parts when you get the machine.  We have people called our sleep therapy management team who will be checking in on you in the first month. They can access data from your machine and help troubleshoot any problems you may have.    Insurances sometimes require a follow up in the 2-3 month range. Please call [phone] to schedule.  VERONIQUE Guadalupe CNP    [facility] MED[facility]IP[facility] LOCATIONS:     SHOWROOM LOCATIONS:    [facility]   [address]  Phone: [phone], option 2, option 1 customer service    [facility]   [address]  [place] [address]  Phone: [phone]    [facility]  [address]  [place] [address]  Phone: [phone]    [facility]  [address]  [place], [place]  Phone: [phone]    [facility]  [address]  [place] [address]  Phone: [phone]    [facility]  [address]  [place], [place]  Phone: [phone]    Business hours 8 AM-4:30 PM

## 2024-09-04 DIAGNOSIS — N18.32 STAGE 3B CHRONIC KIDNEY DISEASE (H): Primary | ICD-10-CM

## 2024-09-04 DIAGNOSIS — N25.81 SECONDARY RENAL HYPERPARATHYROIDISM (H): ICD-10-CM

## 2024-09-05 ENCOUNTER — DOCUMENTATION ONLY (OUTPATIENT)
Dept: SLEEP MEDICINE | Facility: CLINIC | Age: 39
End: 2024-09-05
Payer: COMMERCIAL

## 2024-09-05 DIAGNOSIS — G47.33 OSA (OBSTRUCTIVE SLEEP APNEA): Primary | ICD-10-CM

## 2024-09-05 NOTE — PROGRESS NOTES
Patient was offered choice of vendor and chose [facility].  Patient Divina Delgadillo was set up at [place]  on September 5, 2024. Patient received a Resmed Airsense 10 Pressures were set at  5-15 cm H2O.   Patient s ramp is 5 cm H2O for Auto and FLEX/EPR is EPR, 2.  Patient received a Resmed Mask name: AIRFIT N20  Nasal mask size Small, heated tubing and heated humidifier.  Patient has the following compliance requirements: usage only      Anna Soto

## 2024-09-07 ENCOUNTER — MYC MEDICAL ADVICE (OUTPATIENT)
Dept: SLEEP MEDICINE | Facility: CLINIC | Age: 39
End: 2024-09-07
Payer: COMMERCIAL

## 2024-09-09 ENCOUNTER — LAB (OUTPATIENT)
Dept: LAB | Facility: CLINIC | Age: 39
End: 2024-09-09
Payer: COMMERCIAL

## 2024-09-09 ENCOUNTER — DOCUMENTATION ONLY (OUTPATIENT)
Dept: SLEEP MEDICINE | Facility: CLINIC | Age: 39
End: 2024-09-09
Payer: COMMERCIAL

## 2024-09-09 DIAGNOSIS — Z79.899 LONG-TERM USE OF HIGH-RISK MEDICATION: Primary | ICD-10-CM

## 2024-09-09 LAB
BASOPHILS # BLD AUTO: 0.1 10E3/UL (ref 0–0.2)
BASOPHILS NFR BLD AUTO: 1 %
EOSINOPHIL # BLD AUTO: 0.3 10E3/UL (ref 0–0.7)
EOSINOPHIL NFR BLD AUTO: 2 %
ERYTHROCYTE [DISTWIDTH] IN BLOOD BY AUTOMATED COUNT: 16.1 % (ref 10–15)
HCT VFR BLD AUTO: 39.2 % (ref 35–47)
HGB BLD-MCNC: 12 G/DL (ref 11.7–15.7)
IMM GRANULOCYTES # BLD: 0.1 10E3/UL
IMM GRANULOCYTES NFR BLD: 1 %
LYMPHOCYTES # BLD AUTO: 4.7 10E3/UL (ref 0.8–5.3)
LYMPHOCYTES NFR BLD AUTO: 29 %
MCH RBC QN AUTO: 29 PG (ref 26.5–33)
MCHC RBC AUTO-ENTMCNC: 30.6 G/DL (ref 31.5–36.5)
MCV RBC AUTO: 95 FL (ref 78–100)
MONOCYTES # BLD AUTO: 0.8 10E3/UL (ref 0–1.3)
MONOCYTES NFR BLD AUTO: 5 %
NEUTROPHILS # BLD AUTO: 10.3 10E3/UL (ref 1.6–8.3)
NEUTROPHILS NFR BLD AUTO: 63 %
PLATELET # BLD AUTO: 415 10E3/UL (ref 150–450)
RBC # BLD AUTO: 4.14 10E6/UL (ref 3.8–5.2)
WBC # BLD AUTO: 16.4 10E3/UL (ref 4–11)

## 2024-09-09 PROCEDURE — 85025 COMPLETE CBC W/AUTO DIFF WBC: CPT

## 2024-09-09 PROCEDURE — 36415 COLL VENOUS BLD VENIPUNCTURE: CPT

## 2024-09-09 NOTE — PROGRESS NOTES
3 day Sleep therapy management telephone visit    Diagnostic AHI:   PS.5    SUBJECTIVE: Patient reports doing ok with CPAP. She wants a different mask and will contact [facility].  Patient was given my contact information and instructed to reach out with any questions or concerns.       Order settings:  CPAP MIN CPAP MAX   5 cm H2O 15 cm H2O       Device settings:  CPAP MIN CPAP MAX EPR RESMED SOFT RESPONSE SETTING   5.0 cm  H20 15.0 cm  H20 TWO OFF         Patient has the following upcoming sleep appts:      Replacement device: No  STM ordered by provider: Yes     Total time spent on accessing and  interpreting remote patient PAP therapy data  10 minutes    Total time spent counseling, coaching  and reviewing PAP therapy data with patient  2 minutes

## 2024-09-10 ENCOUNTER — LAB (OUTPATIENT)
Dept: LAB | Facility: CLINIC | Age: 39
End: 2024-09-10
Payer: COMMERCIAL

## 2024-09-10 ENCOUNTER — TELEPHONE (OUTPATIENT)
Dept: NEPHROLOGY | Facility: CLINIC | Age: 39
End: 2024-09-10
Payer: COMMERCIAL

## 2024-09-10 DIAGNOSIS — N18.32 STAGE 3B CHRONIC KIDNEY DISEASE (H): ICD-10-CM

## 2024-09-10 DIAGNOSIS — N25.81 SECONDARY RENAL HYPERPARATHYROIDISM (H): ICD-10-CM

## 2024-09-10 LAB
ANION GAP SERPL CALCULATED.3IONS-SCNC: 10 MMOL/L (ref 7–15)
BUN SERPL-MCNC: 40.9 MG/DL (ref 6–20)
CALCIUM SERPL-MCNC: 9.1 MG/DL (ref 8.8–10.4)
CHLORIDE SERPL-SCNC: 106 MMOL/L (ref 98–107)
CREAT SERPL-MCNC: 1.85 MG/DL (ref 0.51–0.95)
EGFRCR SERPLBLD CKD-EPI 2021: 35 ML/MIN/1.73M2
GLUCOSE SERPL-MCNC: 125 MG/DL (ref 70–99)
HCO3 SERPL-SCNC: 23 MMOL/L (ref 22–29)
POTASSIUM SERPL-SCNC: 5.2 MMOL/L (ref 3.4–5.3)
SODIUM SERPL-SCNC: 139 MMOL/L (ref 135–145)

## 2024-09-10 PROCEDURE — 82306 VITAMIN D 25 HYDROXY: CPT

## 2024-09-10 PROCEDURE — 80048 BASIC METABOLIC PNL TOTAL CA: CPT

## 2024-09-10 PROCEDURE — 36415 COLL VENOUS BLD VENIPUNCTURE: CPT

## 2024-09-10 NOTE — TELEPHONE ENCOUNTER
Called and spoke with pt. discussed lab result note sent via [employer] by Dr. Dias.  ([employer] notification has not been read by pt).    Read back to pt. Copy of Dr. Dias's note.  Pt verbalized understanding and agreed with plan. Pt stated she will schedule the lab appt.  Confirmed Future appt with Dr. Dias and scheduled lab appt prior.    Encouraged pt to call or MyChart if further questions or concerns.    JEMAL Francois -   Your last creatinine level was higher than what was seen previously. Can you have your labs repeated again in the near future for another time point? Recommend good hydration prior to this lab check. I have the lab order in your chart and can be done at the [facility] closest to you. Please call or MyChart with questions or concerns.     Sincerely,  Sánchez Dias DO   Written by Sánchez Dias, DO on 9/4/2024  3:17 PM CDT    Component      Latest Ref Rng 7/30/2024  2:08 PM   Sodium      135 - 145 mmol/L 138    Potassium      3.4 - 5.3 mmol/L 5.1    Carbon Dioxide (CO2)      22 - 29 mmol/L 23    Anion Gap      7 - 15 mmol/L 11    Urea Nitrogen      6.0 - 20.0 mg/dL 41.0 (H)    Creatinine      0.51 - 0.95 mg/dL 2.37 (H)    GFR Estimate      >60 mL/min/1.73m2 26 (L)    Calcium      8.8 - 10.4 mg/dL 8.8    Chloride      98 - 107 mmol/L 104    Glucose      70 - 99 mg/dL 75    Alkaline Phosphatase      40 - 150 U/L 223 (H)    AST      0 - 45 U/L 21    ALT      0 - 50 U/L 35    Protein Total      6.4 - 8.3 g/dL 7.7    Albumin      3.5 - 5.2 g/dL 3.7    Bilirubin Total      <=1.2 mg/dL 0.3    Patient Fasting? No    Color Urine      Colorless, Straw, Light Yellow, Yellow  Yellow    Appearance Urine      Clear  Clear    Glucose Urine      Negative mg/dL Negative    Bilirubin Urine      Negative  Negative    Ketones Urine      Negative mg/dL Negative    Specific Gravity Urine      1.003 - 1.035  1.015    Blood Urine      Negative  Negative    pH Urine      5.0 -  7.0  5.5    Protein Albumin Urine      Negative mg/dL Negative    Urobilinogen Urine      0.2, 1.0 E.U./dL 0.2    Nitrite Urine      Negative  Negative    Leukocyte Esterase Urine      Negative  Negative    Total Protein Urine mg/dL        mg/dL 19.9    Total Protein Urine mg/mg Creat      0.00 - 0.20 mg/mg Cr 0.07    Creatinine Urine      mg/dL 269.5    Parathyroid Hormone Intact      15 - 65 pg/mL 169 (H)    Phosphorus      2.5 - 4.5 mg/dL 4.3       Legend:  (H) High  (L) Low

## 2024-09-11 LAB — VIT D+METAB SERPL-MCNC: 33 NG/ML (ref 20–50)

## 2024-09-24 ENCOUNTER — APPOINTMENT (OUTPATIENT)
Dept: SLEEP MEDICINE | Facility: CLINIC | Age: 39
End: 2024-09-24
Payer: COMMERCIAL

## 2024-09-24 ENCOUNTER — IMMUNIZATION (OUTPATIENT)
Dept: FAMILY MEDICINE | Facility: CLINIC | Age: 39
End: 2024-09-24
Payer: COMMERCIAL

## 2024-09-24 PROCEDURE — 90480 ADMN SARSCOV2 VAC 1/ONLY CMP: CPT

## 2024-09-24 PROCEDURE — G0008 ADMIN INFLUENZA VIRUS VAC: HCPCS

## 2024-09-24 PROCEDURE — 90656 IIV3 VACC NO PRSV 0.5 ML IM: CPT

## 2024-09-24 PROCEDURE — 91320 SARSCV2 VAC 30MCG TRS-SUC IM: CPT

## 2024-10-03 DIAGNOSIS — M80.00XD OSTEOPOROSIS WITH CURRENT PATHOLOGICAL FRACTURE WITH ROUTINE HEALING, UNSPECIFIED OSTEOPOROSIS TYPE, SUBSEQUENT ENCOUNTER: ICD-10-CM

## 2024-10-03 RX ORDER — ALENDRONATE SODIUM 70 MG/1
70 TABLET ORAL
Qty: 12 TABLET | Refills: 0 | Status: SHIPPED | OUTPATIENT
Start: 2024-10-03

## 2024-10-03 NOTE — TELEPHONE ENCOUNTER
Pending Prescriptions:                       Disp   Refills    alendronate (FOSAMAX) 70 MG tablet [Pharm*12 tab*0            Sig: Take 1 tablet (70 mg) by mouth every 7 days    Routing refill request to provider for review/approval because:    Dexa scan completed in the past 48-months       Jose De Jesus Guido RN

## 2024-10-09 ENCOUNTER — DOCUMENTATION ONLY (OUTPATIENT)
Dept: SLEEP MEDICINE | Facility: CLINIC | Age: 39
End: 2024-10-09
Payer: COMMERCIAL

## 2024-10-09 ENCOUNTER — LAB (OUTPATIENT)
Dept: LAB | Facility: CLINIC | Age: 39
End: 2024-10-09
Payer: COMMERCIAL

## 2024-10-09 DIAGNOSIS — F25.0 SCHIZOAFFECTIVE DISORDER, BIPOLAR TYPE (H): Primary | ICD-10-CM

## 2024-10-09 DIAGNOSIS — F41.1 GENERALIZED ANXIETY DISORDER: ICD-10-CM

## 2024-10-09 DIAGNOSIS — Z79.899 HIGH RISK MEDICATION USE: Primary | ICD-10-CM

## 2024-10-09 DIAGNOSIS — Z79.899 ENCOUNTER FOR LONG-TERM (CURRENT) USE OF MEDICATIONS: ICD-10-CM

## 2024-10-09 DIAGNOSIS — F43.10 POST TRAUMATIC STRESS DISORDER: ICD-10-CM

## 2024-10-09 LAB
BASOPHILS # BLD AUTO: 0.1 10E3/UL (ref 0–0.2)
BASOPHILS NFR BLD AUTO: 1 %
EOSINOPHIL # BLD AUTO: 0.2 10E3/UL (ref 0–0.7)
EOSINOPHIL NFR BLD AUTO: 2 %
ERYTHROCYTE [DISTWIDTH] IN BLOOD BY AUTOMATED COUNT: 15.3 % (ref 10–15)
HCT VFR BLD AUTO: 38.4 % (ref 35–47)
HGB BLD-MCNC: 11.7 G/DL (ref 11.7–15.7)
IMM GRANULOCYTES # BLD: 0 10E3/UL
IMM GRANULOCYTES NFR BLD: 0 %
LYMPHOCYTES # BLD AUTO: 4.2 10E3/UL (ref 0.8–5.3)
LYMPHOCYTES NFR BLD AUTO: 28 %
MCH RBC QN AUTO: 29 PG (ref 26.5–33)
MCHC RBC AUTO-ENTMCNC: 30.5 G/DL (ref 31.5–36.5)
MCV RBC AUTO: 95 FL (ref 78–100)
MONOCYTES # BLD AUTO: 0.7 10E3/UL (ref 0–1.3)
MONOCYTES NFR BLD AUTO: 5 %
NEUTROPHILS # BLD AUTO: 9.7 10E3/UL (ref 1.6–8.3)
NEUTROPHILS NFR BLD AUTO: 65 %
PLATELET # BLD AUTO: 401 10E3/UL (ref 150–450)
RBC # BLD AUTO: 4.04 10E6/UL (ref 3.8–5.2)
WBC # BLD AUTO: 14.9 10E3/UL (ref 4–11)

## 2024-10-09 PROCEDURE — 85025 COMPLETE CBC W/AUTO DIFF WBC: CPT

## 2024-10-09 PROCEDURE — 36415 COLL VENOUS BLD VENIPUNCTURE: CPT

## 2024-10-09 NOTE — PROGRESS NOTES
30 DAY STM VISIT    Diagnostic AHI: PS.5   HST:         Subjective measures:   Patient has limited CPAP use due to congestion. We talked about increasing usage of CPAP.      Assessment: Pt not meeting objective benchmarks for compliance  Patient failing following subjective benchmarks: congestion  Patient has the following upcoming sleep appts:    Device type: Auto-CPAP  PAP settings: CPAP min 5.0 cm  H20     CPAP max 15.0 cm  H20    95th% pressure 8.9 cm  H20      RESMED EPR level Setting: TWO    RESMED Soft response setting:  OFF  Mask type:    Objective measures: 14 day rolling measures      Compliance  7 %      Leak  3 lpm  last  upload      AHI 7.9   last  upload      Average number of minutes 27      Objective measure goal  Compliance   Goal >70%  Leak   Goal < 24 lpm  AHI  Goal < 5  Usage  Goal >240        Total time spent on accessing and interpreting remote patient PAP therapy data  10 minutes    Total time spent counseling, coaching  and reviewing PAP therapy data with patient  4 minutes     [id number] this call  [id number] no  at 3 or 14 day [facility]

## 2024-10-23 ENCOUNTER — DOCUMENTATION ONLY (OUTPATIENT)
Dept: SLEEP MEDICINE | Facility: CLINIC | Age: 39
End: 2024-10-23
Payer: COMMERCIAL

## 2024-10-29 ENCOUNTER — LAB (OUTPATIENT)
Dept: LAB | Facility: CLINIC | Age: 39
End: 2024-10-29
Payer: COMMERCIAL

## 2024-10-29 DIAGNOSIS — Z79.899 HIGH RISK MEDICATION USE: ICD-10-CM

## 2024-10-29 DIAGNOSIS — N18.31 TYPE 2 DIABETES MELLITUS WITH STAGE 3A CHRONIC KIDNEY DISEASE, WITH LONG-TERM CURRENT USE OF INSULIN (H): ICD-10-CM

## 2024-10-29 DIAGNOSIS — F43.10 POST TRAUMATIC STRESS DISORDER: ICD-10-CM

## 2024-10-29 DIAGNOSIS — F41.1 GENERALIZED ANXIETY DISORDER: ICD-10-CM

## 2024-10-29 DIAGNOSIS — Z79.4 TYPE 2 DIABETES MELLITUS WITH STAGE 3A CHRONIC KIDNEY DISEASE, WITH LONG-TERM CURRENT USE OF INSULIN (H): ICD-10-CM

## 2024-10-29 DIAGNOSIS — E11.22 TYPE 2 DIABETES MELLITUS WITH STAGE 3A CHRONIC KIDNEY DISEASE, WITH LONG-TERM CURRENT USE OF INSULIN (H): ICD-10-CM

## 2024-10-29 LAB
BASOPHILS # BLD AUTO: 0.1 10E3/UL (ref 0–0.2)
BASOPHILS NFR BLD AUTO: 1 %
EOSINOPHIL # BLD AUTO: 0.3 10E3/UL (ref 0–0.7)
EOSINOPHIL NFR BLD AUTO: 2 %
ERYTHROCYTE [DISTWIDTH] IN BLOOD BY AUTOMATED COUNT: 15.5 % (ref 10–15)
EST. AVERAGE GLUCOSE BLD GHB EST-MCNC: 186 MG/DL
FASTING STATUS PATIENT QL REPORTED: YES
GLUCOSE SERPL-MCNC: 88 MG/DL (ref 70–99)
HBA1C MFR BLD: 8.1 % (ref 0–5.6)
HCT VFR BLD AUTO: 38.3 % (ref 35–47)
HGB BLD-MCNC: 12.1 G/DL (ref 11.7–15.7)
IMM GRANULOCYTES # BLD: 0.1 10E3/UL
IMM GRANULOCYTES NFR BLD: 0 %
LYMPHOCYTES # BLD AUTO: 4.7 10E3/UL (ref 0.8–5.3)
LYMPHOCYTES NFR BLD AUTO: 31 %
MCH RBC QN AUTO: 29.7 PG (ref 26.5–33)
MCHC RBC AUTO-ENTMCNC: 31.6 G/DL (ref 31.5–36.5)
MCV RBC AUTO: 94 FL (ref 78–100)
MONOCYTES # BLD AUTO: 0.9 10E3/UL (ref 0–1.3)
MONOCYTES NFR BLD AUTO: 6 %
NEUTROPHILS # BLD AUTO: 9.2 10E3/UL (ref 1.6–8.3)
NEUTROPHILS NFR BLD AUTO: 60 %
NRBC # BLD AUTO: 0 10E3/UL
NRBC BLD AUTO-RTO: 0 /100
PLATELET # BLD AUTO: 381 10E3/UL (ref 150–450)
RBC # BLD AUTO: 4.08 10E6/UL (ref 3.8–5.2)
WBC # BLD AUTO: 15.3 10E3/UL (ref 4–11)

## 2024-10-29 PROCEDURE — 82947 ASSAY GLUCOSE BLOOD QUANT: CPT

## 2024-10-29 PROCEDURE — 85025 COMPLETE CBC W/AUTO DIFF WBC: CPT

## 2024-10-29 PROCEDURE — 36415 COLL VENOUS BLD VENIPUNCTURE: CPT

## 2024-10-29 PROCEDURE — 82985 ASSAY OF GLYCATED PROTEIN: CPT | Mod: 90

## 2024-10-29 PROCEDURE — 80076 HEPATIC FUNCTION PANEL: CPT

## 2024-10-29 PROCEDURE — 83036 HEMOGLOBIN GLYCOSYLATED A1C: CPT

## 2024-10-29 PROCEDURE — 84681 ASSAY OF C-PEPTIDE: CPT

## 2024-10-29 PROCEDURE — 99000 SPECIMEN HANDLING OFFICE-LAB: CPT

## 2024-10-30 ENCOUNTER — OFFICE VISIT (OUTPATIENT)
Dept: FAMILY MEDICINE | Facility: CLINIC | Age: 39
End: 2024-10-30
Payer: COMMERCIAL

## 2024-10-30 VITALS
HEART RATE: 72 BPM | OXYGEN SATURATION: 95 % | WEIGHT: 230.8 LBS | RESPIRATION RATE: 16 BRPM | BODY MASS INDEX: 39.62 KG/M2 | TEMPERATURE: 98.1 F | SYSTOLIC BLOOD PRESSURE: 107 MMHG | DIASTOLIC BLOOD PRESSURE: 57 MMHG

## 2024-10-30 DIAGNOSIS — J45.20 MILD INTERMITTENT ASTHMA WITHOUT COMPLICATION: ICD-10-CM

## 2024-10-30 DIAGNOSIS — Z79.4 TYPE 2 DIABETES MELLITUS WITH STAGE 3A CHRONIC KIDNEY DISEASE, WITH LONG-TERM CURRENT USE OF INSULIN (H): Primary | ICD-10-CM

## 2024-10-30 DIAGNOSIS — N18.31 TYPE 2 DIABETES MELLITUS WITH STAGE 3A CHRONIC KIDNEY DISEASE, WITH LONG-TERM CURRENT USE OF INSULIN (H): Primary | ICD-10-CM

## 2024-10-30 DIAGNOSIS — N25.81 SECONDARY RENAL HYPERPARATHYROIDISM (H): ICD-10-CM

## 2024-10-30 DIAGNOSIS — R10.2 VAGINAL PAIN: ICD-10-CM

## 2024-10-30 DIAGNOSIS — E11.22 TYPE 2 DIABETES MELLITUS WITH STAGE 3A CHRONIC KIDNEY DISEASE, WITH LONG-TERM CURRENT USE OF INSULIN (H): Primary | ICD-10-CM

## 2024-10-30 DIAGNOSIS — B37.31 YEAST INFECTION OF THE VAGINA: Primary | ICD-10-CM

## 2024-10-30 LAB
C PEPTIDE SERPL-MCNC: 0.6 NG/ML (ref 0.9–6.9)
CLUE CELLS: ABNORMAL
FASTING STATUS PATIENT QL REPORTED: YES
TRICHOMONAS, WET PREP: ABNORMAL
WBC'S/HIGH POWER FIELD, WET PREP: ABNORMAL
YEAST, WET PREP: PRESENT

## 2024-10-30 PROCEDURE — G2211 COMPLEX E/M VISIT ADD ON: HCPCS | Performed by: STUDENT IN AN ORGANIZED HEALTH CARE EDUCATION/TRAINING PROGRAM

## 2024-10-30 PROCEDURE — 99214 OFFICE O/P EST MOD 30 MIN: CPT | Performed by: STUDENT IN AN ORGANIZED HEALTH CARE EDUCATION/TRAINING PROGRAM

## 2024-10-30 PROCEDURE — 87210 SMEAR WET MOUNT SALINE/INK: CPT | Performed by: STUDENT IN AN ORGANIZED HEALTH CARE EDUCATION/TRAINING PROGRAM

## 2024-10-30 RX ORDER — FLUCONAZOLE 150 MG/1
150 TABLET ORAL ONCE
Qty: 1 TABLET | Refills: 0 | Status: SHIPPED | OUTPATIENT
Start: 2024-10-30 | End: 2024-10-30

## 2024-10-30 ASSESSMENT — ASTHMA QUESTIONNAIRES
ACT_TOTALSCORE: 25
EMERGENCY_ROOM_LAST_YEAR_TOTAL: TWO
QUESTION_2 LAST FOUR WEEKS HOW OFTEN HAVE YOU HAD SHORTNESS OF BREATH: NOT AT ALL
ACT_TOTALSCORE: 25
QUESTION_3 LAST FOUR WEEKS HOW OFTEN DID YOUR ASTHMA SYMPTOMS (WHEEZING, COUGHING, SHORTNESS OF BREATH, CHEST TIGHTNESS OR PAIN) WAKE YOU UP AT NIGHT OR EARLIER THAN USUAL IN THE MORNING: NOT AT ALL
QUESTION_4 LAST FOUR WEEKS HOW OFTEN HAVE YOU USED YOUR RESCUE INHALER OR NEBULIZER MEDICATION (SUCH AS ALBUTEROL): NOT AT ALL
QUESTION_5 LAST FOUR WEEKS HOW WOULD YOU RATE YOUR ASTHMA CONTROL: COMPLETELY CONTROLLED
QUESTION_1 LAST FOUR WEEKS HOW MUCH OF THE TIME DID YOUR ASTHMA KEEP YOU FROM GETTING AS MUCH DONE AT WORK, SCHOOL OR AT HOME: NONE OF THE TIME

## 2024-10-30 ASSESSMENT — PAIN SCALES - GENERAL: PAINLEVEL_OUTOF10: MODERATE PAIN (5)

## 2024-10-30 NOTE — PROGRESS NOTES
"  Assessment & Plan     Type 2 diabetes mellitus with stage 3a chronic kidney disease, with long-term current use of insulin (H)  > patient is on the Medtronic 780 insulin pump with the G4 sensor which has been incredibly helpful in helping her manage her glucose levels  -Today her postmeal lunch glucose was 113, she does not have any episodes of symptomatic hypoglycemia and has noted a night and day difference in how valuable the pump and sensor have been in helping her control her diabetes   - Etiologies that could potentially explain a falsely elevated A1c  include: anemia, kidney failure, liver disease, sickle cell anemia or thalassemia (she had previous morphology results which showed polychromasia from Aug 2022??)  - Fructosamine; Future   - LFTs     Secondary renal hyperparathyroidism   -The patient does have a history of secondary renal hyperparathyroidism, perhaps her renal function is what is resulting in her falsely elevated A1c results?    Vaginal pain  > etiology unclear patient is not sexually active   - Wet prep - lab collect  - if wet prep is negative, may need to consider referral to OBGYN give patient's recent pap was positive for HR HPV, states she previously had an abnormal pap \"when I was really really young\"     Mild intermittent asthma without complication   > Currently well-controlled, albuterol previously prescribed     The longitudinal plan of care for the diagnosis(es)/condition(s) as documented were addressed during this visit. Due to the added complexity in care, I will continue to support Divina in the subsequent management and with ongoing continuity of care.      Subjective   Divina is a 39 year old, presenting for the following health issues:  Vaginal Problem (Pain in vaginal area, was just diagnosed with HPV and has some questions about this ) and Letter Request (Requesting letter for insurance regarding insulin pump - diabetic educator said she would be reaching out to Dr. Dawson to " "notify her of specifics /)        10/30/2024     2:01 PM   Additional Questions   Roomed by Jaimee RIVAS     History of Present Illness       Reason for visit:  Getting doctor's note for insurence company   She is taking medications regularly.    Diabetes  She is on the Medtronic 780 insulin pump with the G4 sensor. Patient states diabetes is being \"way better\" managed compared to what she was trying in the past. It has helped compliance with insulin administration. And it's much easier for patient to get her insulin in social settings like at a restaurant with her pump that she previously wasn't able to do due to not having immediate access to pens. Reports her glucose levels have been controlled. Currently her BG is 113 shortly after finishing lunch. Denies any symptoms associated with hypoglycemia, the lowest BG she reports having with the pump is 65 \"but it's been a while since it was that low\" that happened once at night         Vaginal problem   pain in vaginal area, was just diagnosed with HPV and has some questions about this   Onset: couple of months   Duration: constant   Denies any vaginal discharge or fishy odor   She is not sexually active, she does have an IUD in place that is set to be replaced next year   Denies any urinary symptoms   LMP was reported to be 7 years ago      ROS:   As above         Objective    /57 (BP Location: Right arm, Patient Position: Sitting, Cuff Size: Adult Large)   Pulse 72   Temp 98.1  F (36.7  C) (Oral)   Resp 16   Wt 104.7 kg (230 lb 12.8 oz)   SpO2 95%   BMI 39.62 kg/m    Body mass index is 39.62 kg/m .  Physical Exam  Constitutional:       General: She is not in acute distress.  HENT:      Head: Normocephalic and atraumatic.      Right Ear: External ear normal.      Left Ear: External ear normal.      Mouth/Throat:      Mouth: Mucous membranes are moist.      Pharynx: Oropharynx is clear. No oropharyngeal exudate or posterior oropharyngeal erythema.   Eyes:      " Extraocular Movements: Extraocular movements intact.   Cardiovascular:      Rate and Rhythm: Normal rate and regular rhythm.      Heart sounds: Normal heart sounds.   Pulmonary:      Effort: Pulmonary effort is normal. No respiratory distress.      Breath sounds: Normal breath sounds. No wheezing or rhonchi.   Abdominal:      Palpations: Abdomen is soft. There is no mass.      Tenderness: There is no abdominal tenderness.   Musculoskeletal:         General: No deformity. Normal range of motion.      Cervical back: Normal range of motion and neck supple.   Skin:     General: Skin is warm.      Findings: No rash.   Neurological:      General: No focal deficit present.      Mental Status: She is alert and oriented to person, place, and time.   Psychiatric:         Mood and Affect: Mood normal.      Comments: Pleasant               Signed Electronically by: APRIL BETANCUR MD

## 2024-10-30 NOTE — RESULT ENCOUNTER NOTE
Fito Delgadillo,     It is a pleasure providing you with medical care. I have received and reviewed your results, and have the following recommendations:      Based on the results of your wet prep, you were noted to have a yeast infection. Because of this I will be sending a Prescription for Diflucan 150 mg orally to your pharmacy.      Sincerely,     Chyna Dawson MD

## 2024-10-31 LAB
ALBUMIN SERPL BCG-MCNC: 3.9 G/DL (ref 3.5–5.2)
ALP SERPL-CCNC: 213 U/L (ref 40–150)
ALT SERPL W P-5'-P-CCNC: 26 U/L (ref 0–50)
AST SERPL W P-5'-P-CCNC: 22 U/L (ref 0–45)
BILIRUB DIRECT SERPL-MCNC: <0.2 MG/DL (ref 0–0.3)
BILIRUB SERPL-MCNC: <0.2 MG/DL
PROT SERPL-MCNC: 8 G/DL (ref 6.4–8.3)

## 2024-11-01 LAB — FRUCTOSAMINE SERPL-SCNC: 296 UMOL/L

## 2024-11-05 DIAGNOSIS — F25.0 SCHIZOAFFECTIVE DISORDER, BIPOLAR TYPE (H): ICD-10-CM

## 2024-11-05 DIAGNOSIS — Z79.899 HIGH RISK MEDICATION USE: Primary | ICD-10-CM

## 2024-11-07 ENCOUNTER — LAB (OUTPATIENT)
Dept: LAB | Facility: CLINIC | Age: 39
End: 2024-11-07
Payer: COMMERCIAL

## 2024-11-07 DIAGNOSIS — F25.0 SCHIZOAFFECTIVE DISORDER, BIPOLAR TYPE (H): ICD-10-CM

## 2024-11-07 DIAGNOSIS — Z79.899 HIGH RISK MEDICATION USE: ICD-10-CM

## 2024-11-07 LAB
BASOPHILS # BLD AUTO: 0.1 10E3/UL (ref 0–0.2)
BASOPHILS NFR BLD AUTO: 1 %
EOSINOPHIL # BLD AUTO: 0.2 10E3/UL (ref 0–0.7)
EOSINOPHIL NFR BLD AUTO: 2 %
ERYTHROCYTE [DISTWIDTH] IN BLOOD BY AUTOMATED COUNT: 15.3 % (ref 10–15)
HCT VFR BLD AUTO: 37.4 % (ref 35–47)
HGB BLD-MCNC: 11.8 G/DL (ref 11.7–15.7)
IMM GRANULOCYTES # BLD: 0.1 10E3/UL
IMM GRANULOCYTES NFR BLD: 1 %
LYMPHOCYTES # BLD AUTO: 4.3 10E3/UL (ref 0.8–5.3)
LYMPHOCYTES NFR BLD AUTO: 28 %
MCH RBC QN AUTO: 29.6 PG (ref 26.5–33)
MCHC RBC AUTO-ENTMCNC: 31.6 G/DL (ref 31.5–36.5)
MCV RBC AUTO: 94 FL (ref 78–100)
MONOCYTES # BLD AUTO: 0.9 10E3/UL (ref 0–1.3)
MONOCYTES NFR BLD AUTO: 6 %
NEUTROPHILS # BLD AUTO: 9.5 10E3/UL (ref 1.6–8.3)
NEUTROPHILS NFR BLD AUTO: 63 %
NRBC # BLD AUTO: 0 10E3/UL
NRBC BLD AUTO-RTO: 0 /100
PLATELET # BLD AUTO: 377 10E3/UL (ref 150–450)
RBC # BLD AUTO: 3.99 10E6/UL (ref 3.8–5.2)
WBC # BLD AUTO: 15.1 10E3/UL (ref 4–11)

## 2024-11-07 PROCEDURE — 85025 COMPLETE CBC W/AUTO DIFF WBC: CPT

## 2024-11-07 PROCEDURE — 36415 COLL VENOUS BLD VENIPUNCTURE: CPT

## 2024-11-07 ASSESSMENT — PATIENT HEALTH QUESTIONNAIRE - PHQ9: SUM OF ALL RESPONSES TO PHQ QUESTIONS 1-9: 2

## 2024-11-08 ENCOUNTER — TELEPHONE (OUTPATIENT)
Dept: FAMILY MEDICINE | Facility: CLINIC | Age: 39
End: 2024-11-08
Payer: COMMERCIAL

## 2024-11-08 DIAGNOSIS — R74.8 ELEVATED ALKALINE PHOSPHATASE LEVEL: Primary | ICD-10-CM

## 2024-11-08 NOTE — RESULT ENCOUNTER NOTE
Fito Delgadillo,     Based on your lab results, you were noted to have a slight improvement in fructosamine levels. Your ALP levels are still elevated compared to past values, because of this I will be placing order for a liver ultrasound to see if any abnormalities are noted.    Please call [phone] to schedule an appointment for your ultrasound.       Sincerely,     Chyna Dawson MD

## 2024-11-08 NOTE — TELEPHONE ENCOUNTER
HALLIE Ross case mgr, [facility], [phone], called to report medication discrepancies that she noted while seeing pt.    Vandana says that she will verify with psychiatry since it appears that Abilify is prescribed by psych.     Aripiprazole, 30mg, daily, is reported in pt's chart.  However, pt has 15 mg tabs at home.  Pharmacy has 30 mg on file.    Topamax is listed in pt's chart as 25 mg dose at bedtime for migraines as written by PCP on 5/9/24 but pt has been taking 25 mg BID.    Vandana will verify correct doses and get back to us with updated doses.    Odalys Mattson RN

## 2024-11-11 NOTE — PROGRESS NOTES
Divina Delgadillo is a 39 year old female  Chief Complaint: Otitis externa. Seen by [facility] in the past. For the past several months foul smelling exudate from both ears  History of Present Illness  Location: ears  Quality:drainage  Severity:moderate  Duration: 2 months    Past Medical History -   Patient Active Problem List   Diagnosis    Esophageal reflux    NAFL (nonalcoholic fatty liver)    Essential hypertension    Hyperlipidemia LDL goal <100    Stage 3 chronic kidney disease    Mucinous neoplasm of pancreas    Type 2 diabetes mellitus with stage 3 chronic kidney disease, with long-term current use of insulin (H)    Bipolar disorder (H)    Cervical high risk HPV (human papillomavirus) test positive    IUD (intrauterine device) in place    Morbid obesity (H)    Mild intermittent asthma with acute exacerbation    Nicotine abuse    Chronic leukocytosis    Acquired asplenia    Borderline personality disorder (H)    PTSD (post-traumatic stress disorder)    Long term (current) use of anticoagulants    Orthostatic hypotension    Tobacco abuse    Vitamin D insufficiency    Depressive disorder    Schizoaffective disorder, depressive type (H)    Cardiac murmur    History of DVT of arm  (deep vein thrombosis)    Insomnia, unspecified type    Ulnar neuropathy of both upper extremities    Chiari malformation type I (H)    Deep venous thrombosis of arm (H)    Neutrophilic leukocytosis    Other fracture of left femur, initial encounter for closed fracture (H)    Peroneal tendinitis of left lower extremity    Acute left ankle pain    Avascular necrosis of bone of ankle (H)    Secondary renal hyperparathyroidism (H)       Current Medications -   Current Outpatient Medications:     ACCU-CHEK GUIDE test strip, Use to test blood sugar 3 times daily or as directed., Disp: 150 strip, Rfl: 1    acetaminophen (TYLENOL) 325 MG tablet, Take 650 mg by mouth every 4 hours as needed for pain or fever 2 tab every 4-6 hours as needed, do not  exceed 9 tabs in 24hours, Disp: , Rfl:     acetaminophen (TYLENOL) 500 MG tablet, Take 1-2 tablets (500-1,000 mg) by mouth every 8 hours as needed for mild pain, Disp: 30 tablet, Rfl: 0    albuterol (VENTOLIN HFA) 108 (90 Base) MCG/ACT inhaler, INHALE 2 PUFFS INTO THE LUNGS EVERY SIX  HOURS AS NEEDED FOR SHORTNESS OF BREATH, Disp: 18 g, Rfl: 10    alendronate (FOSAMAX) 70 MG tablet, Take 1 tablet (70 mg) by mouth every 7 days, Disp: 12 tablet, Rfl: 0    ARIPiprazole (ABILIFY) 30 MG tablet, Take 30 mg by mouth daily, Disp: , Rfl:     atorvastatin (LIPITOR) 20 MG tablet, Take 1 tablet (20 mg) by mouth daily, Disp: 90 tablet, Rfl: 3    Biotin (BIOTIN FORTE) 5 MG TABS, Take 1 tablet by mouth daily, Disp: 60 tablet, Rfl: 1    blood glucose (ACCU-CHEK GUIDE) test strip, Use to test blood sugar 3 times daily or as directed., Disp: 100 strip, Rfl: 11    bumetanide (BUMEX) 0.5 MG tablet, Take 1 tablet (0.5 mg) by mouth daily, Disp: 30 tablet, Rfl: 2    carvedilol (COREG) 12.5 MG tablet, Take 1 tablet (12.5 mg) by mouth 2 times daily, Disp: 60 tablet, Rfl: 11    cloZAPine (CLOZARIL) 50 MG tablet, Take 150 mg by mouth at bedtime, Disp: , Rfl:     diclofenac (VOLTAREN) 1 % topical gel, Apply 2 g topically 4 times daily, Disp: 150 g, Rfl: 1    docusate sodium (COLACE) 100 MG capsule, Take 1 capsule (100 mg) by mouth 2 times daily as needed for constipation, Disp: 60 capsule, Rfl: 1    FLUoxetine (PROZAC) 40 MG capsule, Take 40 mg by mouth daily, Disp: , Rfl:     glucose (BD GLUCOSE) 4 g chewable tablet, Take 1 tablet by mouth every hour as needed for low blood sugar, Disp: 30 tablet, Rfl: 4    hydrOXYzine HCl (ATARAX) 25 MG tablet, Take 25 mg by mouth 3 times daily as needed for anxiety, Disp: , Rfl:     insulin aspart (NOVOLOG VIAL) 100 UNITS/ML vial, for use with continuous insulin pump  Max TDD 80, Disp: 150 mL, Rfl: 1    Insulin Infusion Pump (MINIMED 780G INSULIN PUMP) KIT, 1 each daily SmartGuard Target: 100; Active  "Insulin Time: 2 hours (recommended); SmartGuardTM Warmup/Manual Mode: Suspend before low (recommended), Disp: 1 kit, Rfl: 0    Insulin Infusion Pump Supplies (EXTENDED INFUSION SET 23\"/6MM) MISC, 1 each every 7 days Max Total Daily Dose 70 units; Change infusion set & reservoir every 7 days (recommended), Disp: 10 each, Rfl: 6    Insulin Infusion Pump Supplies (EXTENDED RESERVOIR 3ML) MISC, 1 each every 7 days, Disp: 10 each, Rfl: 11    lamoTRIgine (LAMICTAL) 100 MG tablet, Take 100 mg by mouth At Bedtime, Disp: , Rfl:     lisinopril (ZESTRIL) 2.5 MG tablet, Take 1 tablet (2.5 mg) by mouth daily, Disp: 90 tablet, Rfl: 1    loratadine (CLARITIN) 10 MG tablet, Take 1 tablet (10 mg) by mouth every morning, Disp: 30 tablet, Rfl: 9    meclizine (ANTIVERT) 25 MG tablet, Take 1 tablet (25 mg) by mouth 3 times daily as needed for dizziness, Disp: 30 tablet, Rfl: 0    nitroGLYcerin 0.2% ointment, Apply 1 click (0.25 g) topically every 24 hours for 14 days, Disp: 4 g, Rfl: 0    omeprazole (PRILOSEC) 20 MG DR capsule, TAKE 1 CAPSULE BY MOUTH EVERY DAY, Disp: 90 capsule, Rfl: 2    oxyCODONE (ROXICODONE) 5 MG tablet, Take 1 tablet (5 mg) by mouth 2 times daily as needed for pain, Disp: 12 tablet, Rfl: 0    Suvorexant (BELSOMRA) 10 MG tablet, Take 10 mg by mouth at bedtime, Disp: , Rfl:     topiramate (TOPAMAX) 25 MG tablet, Take 1 tablet (25 mg) by mouth at bedtime, Disp: 90 tablet, Rfl: 3    Allergies -   Allergies   Allergen Reactions    Acetaminophen Other (See Comments)     pt previously tried to overdose on it, PMD does not want pt taking per pt.        Social History -   Social History     Socioeconomic History    Marital status: Single    Number of children: 0   Tobacco Use    Smoking status: Former     Current packs/day: 0.00     Average packs/day: 0.5 packs/day for 0.2 years (0.1 ttl pk-yrs)     Types: Cigarettes     Start date: 2023     Quit date: 2024     Years since quittin.7     Passive exposure: Yes "    Smokeless tobacco: Never    Tobacco comments:     A pack every 3 days   Vaping Use    Vaping status: Every Day    Substances: Nicotine, Flavoring    Devices: Disposable   Substance and Sexual Activity    Alcohol use: No     Comment: She is in AA and just got her 18 month medalion (03/2024)    Drug use: No    Sexual activity: Yes     Partners: Female     Birth control/protection: I.U.D.     Comment: Both male and female    Other Topics Concern    Parent/sibling w/ CABG, MI or angioplasty before 65F 55M? No     Social Drivers of Health     Financial Resource Strain: Low Risk  (4/24/2024)    Financial Resource Strain     Within the past 12 months, have you or your family members you live with been unable to get utilities (heat, electricity) when it was really needed?: No   Food Insecurity: Low Risk  (4/24/2024)    Food Insecurity     Within the past 12 months, did you worry that your food would run out before you got money to buy more?: No     Within the past 12 months, did the food you bought just not last and you didn t have money to get more?: No   Transportation Needs: Low Risk  (4/24/2024)    Transportation Needs     Within the past 12 months, has lack of transportation kept you from medical appointments, getting your medicines, non-medical meetings or appointments, work, or from getting things that you need?: No   Physical Activity: Unknown (4/24/2024)    Exercise Vital Sign     Days of Exercise per Week: 2 days   Stress: No Stress Concern Present (4/24/2024)    [employer] - Occupational Stress Questionnaire     Feeling of Stress : Not at all   Social Connections: Unknown (4/24/2024)    Social Connection and Isolation Panel [NHANES]     Frequency of Social Gatherings with Friends and Family: More than three times a week   Interpersonal Safety: Low Risk  (6/7/2024)    Interpersonal Safety     Do you feel physically and emotionally safe where you currently live?: Yes     Within the  past 12 months, have you been hit, slapped, kicked or otherwise physically hurt by someone?: No     Within the past 12 months, have you been humiliated or emotionally abused in other ways by your partner or ex-partner?: No   Housing Stability: Low Risk  (4/24/2024)    Housing Stability     Do you have housing? : Yes     Are you worried about losing your housing?: No       Family History -   Family History   Problem Relation Age of Onset    Respiratory Mother         ASTHMA    Allergies Mother     Depression Mother     Chronic Obstructive Pulmonary Disease Mother     Anxiety Disorder Mother     Mental Illness Mother     Asthma Mother     Heart Disease Father         HEART VALVE REPLACEMENT    Hypertension Father     Diabetes Father     Depression Father     Anxiety Disorder Father     Mental Illness Father     Obesity Father     Sleep Apnea Maternal Grandfather     Respiratory Brother     Allergies Brother     Respiratory Sister     Allergies Sister     Depression Sister     Respiratory Sister     Allergies Sister     Depression Sister     Kidney Disease No family hx of        Review of Systems:   !.  Weight Loss: No   2. Difficulty Breathing: No   3. Difficulty Swallowing: No   4. Pain: No    Physical Exam  B/P: Data Unavailable, T: Data Unavailable, P: Data Unavailable, R: Data Unavailable  Vitals: There were no vitals taken for this visit.  BMI= There is no height or weight on file to calculate BMI.    General  Appearance - Normal  Head/Face/Scalp:    Skin - Normal    Facial Palpation - Normal    Facial Strength - Normal  Ears:    Pinna - Normal    Canal - erythematous and scaly skin   Tympanic membrane - moist bilaterally, with some purulent exudate on the left, epitympanic retraction. left  Nose:    External - Normal    Septum - Normal    Turbinates - Normal    Middle meatus - Normal  Oral Cavity:    Lips - Normal    Floor of Mouth - Normal    Gingiva - Normal    Tongue - Normal    Buccal - Normal    Palate -  Normal  Nasopharynx:    Oropharynx:    Tonsils - Normal    Tongue base - Normal    Soft palate - Normal    Posterior pharyngeal wall - Normal  Hypopharynx:  Larynx:    Epiglottis -     Aryepiglottic folds -     Arytenoids -     False vocal cords -     True vocal cords -  Neck Masses - No  Neck lymphatics - no lymphadenopathy  Thyroid - Normal  Salivary glands - Normal    Audiogram - not applicable  Radiology - not applicable   Reports:   View films:  Procedures - not applicable  Patient Education:     A/P - iDvina Lopessimónclarence is a 39 year old female  Medical Decision Making Bilateral otitis externa 2. Bilateral myringitis  Will start Cipro dex drops x 10 days, then follow up

## 2024-11-11 NOTE — RESULT ENCOUNTER NOTE
Looks like the patient has not seen their results. Could someone please notify the patient of their results and recommendations?     Thank you so much!     Sincerely,     Chyna Dawson MD

## 2024-11-14 ENCOUNTER — OFFICE VISIT (OUTPATIENT)
Dept: OTOLARYNGOLOGY | Facility: CLINIC | Age: 39
End: 2024-11-14
Payer: COMMERCIAL

## 2024-11-14 ENCOUNTER — OFFICE VISIT (OUTPATIENT)
Dept: AUDIOLOGY | Facility: CLINIC | Age: 39
End: 2024-11-14
Payer: COMMERCIAL

## 2024-11-14 VITALS
DIASTOLIC BLOOD PRESSURE: 55 MMHG | BODY MASS INDEX: 39.48 KG/M2 | TEMPERATURE: 98.3 F | WEIGHT: 230 LBS | HEART RATE: 65 BPM | SYSTOLIC BLOOD PRESSURE: 110 MMHG

## 2024-11-14 DIAGNOSIS — H60.393 INFECTIVE OTITIS EXTERNA, BILATERAL: Primary | ICD-10-CM

## 2024-11-14 DIAGNOSIS — H73.23 MYRINGITIS OF BOTH EARS: ICD-10-CM

## 2024-11-14 DIAGNOSIS — H90.6 MIXED HEARING LOSS, BILATERAL: Primary | ICD-10-CM

## 2024-11-14 PROCEDURE — 92557 COMPREHENSIVE HEARING TEST: CPT | Performed by: AUDIOLOGIST

## 2024-11-14 PROCEDURE — 92550 TYMPANOMETRY & REFLEX THRESH: CPT | Performed by: AUDIOLOGIST

## 2024-11-14 PROCEDURE — 99213 OFFICE O/P EST LOW 20 MIN: CPT | Performed by: OTOLARYNGOLOGY

## 2024-11-14 PROCEDURE — G2211 COMPLEX E/M VISIT ADD ON: HCPCS | Performed by: OTOLARYNGOLOGY

## 2024-11-14 RX ORDER — CIPROFLOXACIN AND DEXAMETHASONE 3; 1 MG/ML; MG/ML
4 SUSPENSION/ DROPS AURICULAR (OTIC) 2 TIMES DAILY
Qty: 7.5 ML | Refills: 1 | Status: SHIPPED | OUTPATIENT
Start: 2024-11-14 | End: 2024-11-24

## 2024-11-14 ASSESSMENT — PAIN SCALES - GENERAL: PAINLEVEL_OUTOF10: MILD PAIN (2)

## 2024-11-14 NOTE — NURSING NOTE
"Initial /55 (BP Location: Right arm, Patient Position: Chair, Cuff Size: Adult Large)   Pulse 65   Temp 98.3  F (36.8  C) (Tympanic)   Wt 104.3 kg (230 lb)   BMI 39.48 kg/m   Estimated body mass index is 39.48 kg/m  as calculated from the following:    Height as of 8/26/24: 1.626 m (5' 4\").    Weight as of this encounter: 104.3 kg (230 lb). .  June Burroughs LPN    "

## 2024-11-14 NOTE — PROGRESS NOTES
AUDIOLOGY REPORT:    Patient was referred to [facility] from ENT by Dr. Renteria for a hearing examination. Patient reports bilateral otalgia, some drainage, and a fowl odor from her ears. They were accompanied today by their friend.    Testing:    Otoscopy:   Otoscopic exam indicates ears are clear of cerumen bilaterally  NOTE: There is some thick whiteish fluid deep in both ear canals.      Tympanograms:    RIGHT: normal eardrum mobility     LEFT:   restricted eardrum mobility [Type B]    Reflexes (reported by stimulus ear): 1000 Hz  RIGHT: Ipsilateral is could not seal   RIGHT: Contralateral is absent at frequencies tested  LEFT:   Ipsilateral is absent at frequencies tested  LEFT:   Contralateral is could not seal     Thresholds:   Pure Tone Thresholds assessed using standard techniques audiometry with good  reliability from 250-8000 Hz bilaterally using insert earphones and circumaural headphones     RIGHT:  mild-moderate mixed hearing loss    LEFT:    mild-moderate mixed hearing loss   NOTE: Change in transducers did not merit a change in thresholds.     Speech Reception Threshold:    RIGHT: 35 dB HL    LEFT:   45 dB HL    Word Recognition Score:     RIGHT: 84% at 80 dB HL using NU-6 recorded word list.    LEFT:   92% at 80 dB HL using NU-6 recorded word list.    Discussed results with the patient and her friend.     Patient was returned to ENT for follow up.     Tommy Cassidy CCC-A  Licensed Audiologist  11/14/2024

## 2024-11-14 NOTE — LETTER
11/14/2024      Divina Delgadillo  [id number] [address] [address]      Dear Colleague,    Thank you for referring your patient, Divina Delgadillo, to the [facility]. Please see a copy of my visit note below.    Divina Delgadillo is a 39 year old female  Chief Complaint: Otitis externa. Seen by [facility] in the past. For the past several months foul smelling exudate from both ears  History of Present Illness  Location: ears  Quality:drainage  Severity:moderate  Duration: 2 months    Past Medical History -   Patient Active Problem List   Diagnosis     Esophageal reflux     NAFL (nonalcoholic fatty liver)     Essential hypertension     Hyperlipidemia LDL goal <100     Stage 3 chronic kidney disease     Mucinous neoplasm of pancreas     Type 2 diabetes mellitus with stage 3 chronic kidney disease, with long-term current use of insulin (H)     Bipolar disorder (H)     Cervical high risk HPV (human papillomavirus) test positive     IUD (intrauterine device) in place     Morbid obesity (H)     Mild intermittent asthma with acute exacerbation     Nicotine abuse     Chronic leukocytosis     Acquired asplenia     Borderline personality disorder (H)     PTSD (post-traumatic stress disorder)     Long term (current) use of anticoagulants     Orthostatic hypotension     Tobacco abuse     Vitamin D insufficiency     Depressive disorder     Schizoaffective disorder, depressive type (H)     Cardiac murmur     History of DVT of arm  (deep vein thrombosis)     Insomnia, unspecified type     Ulnar neuropathy of both upper extremities     Chiari malformation type I (H)     Deep venous thrombosis of arm (H)     Neutrophilic leukocytosis     Other fracture of left femur, initial encounter for closed fracture (H)     Peroneal tendinitis of left lower extremity     Acute left ankle pain     Avascular necrosis of bone of ankle (H)     Secondary renal hyperparathyroidism (H)       Current Medications -    Current Outpatient Medications:      ACCU-CHEK GUIDE test strip, Use to test blood sugar 3 times daily or as directed., Disp: 150 strip, Rfl: 1     acetaminophen (TYLENOL) 325 MG tablet, Take 650 mg by mouth every 4 hours as needed for pain or fever 2 tab every 4-6 hours as needed, do not exceed 9 tabs in 24hours, Disp: , Rfl:      acetaminophen (TYLENOL) 500 MG tablet, Take 1-2 tablets (500-1,000 mg) by mouth every 8 hours as needed for mild pain, Disp: 30 tablet, Rfl: 0     albuterol (VENTOLIN HFA) 108 (90 Base) MCG/ACT inhaler, INHALE 2 PUFFS INTO THE LUNGS EVERY SIX  HOURS AS NEEDED FOR SHORTNESS OF BREATH, Disp: 18 g, Rfl: 10     alendronate (FOSAMAX) 70 MG tablet, Take 1 tablet (70 mg) by mouth every 7 days, Disp: 12 tablet, Rfl: 0     ARIPiprazole (ABILIFY) 30 MG tablet, Take 30 mg by mouth daily, Disp: , Rfl:      atorvastatin (LIPITOR) 20 MG tablet, Take 1 tablet (20 mg) by mouth daily, Disp: 90 tablet, Rfl: 3     Biotin (BIOTIN FORTE) 5 MG TABS, Take 1 tablet by mouth daily, Disp: 60 tablet, Rfl: 1     blood glucose (ACCU-CHEK GUIDE) test strip, Use to test blood sugar 3 times daily or as directed., Disp: 100 strip, Rfl: 11     bumetanide (BUMEX) 0.5 MG tablet, Take 1 tablet (0.5 mg) by mouth daily, Disp: 30 tablet, Rfl: 2     carvedilol (COREG) 12.5 MG tablet, Take 1 tablet (12.5 mg) by mouth 2 times daily, Disp: 60 tablet, Rfl: 11     cloZAPine (CLOZARIL) 50 MG tablet, Take 150 mg by mouth at bedtime, Disp: , Rfl:      diclofenac (VOLTAREN) 1 % topical gel, Apply 2 g topically 4 times daily, Disp: 150 g, Rfl: 1     docusate sodium (COLACE) 100 MG capsule, Take 1 capsule (100 mg) by mouth 2 times daily as needed for constipation, Disp: 60 capsule, Rfl: 1     FLUoxetine (PROZAC) 40 MG capsule, Take 40 mg by mouth daily, Disp: , Rfl:      glucose (BD GLUCOSE) 4 g chewable tablet, Take 1 tablet by mouth every hour as needed for low blood sugar, Disp: 30 tablet, Rfl: 4     hydrOXYzine HCl (ATARAX) 25 MG  "tablet, Take 25 mg by mouth 3 times daily as needed for anxiety, Disp: , Rfl:      insulin aspart (NOVOLOG VIAL) 100 UNITS/ML vial, for use with continuous insulin pump  Max TDD 80, Disp: 150 mL, Rfl: 1     Insulin Infusion Pump (MINIMED 780G INSULIN PUMP) KIT, 1 each daily SmartGuard Target: 100; Active Insulin Time: 2 hours (recommended); SmartGuardTM Warmup/Manual Mode: Suspend before low (recommended), Disp: 1 kit, Rfl: 0     Insulin Infusion Pump Supplies (EXTENDED INFUSION SET 23\"/6MM) MISC, 1 each every 7 days Max Total Daily Dose 70 units; Change infusion set & reservoir every 7 days (recommended), Disp: 10 each, Rfl: 6     Insulin Infusion Pump Supplies (EXTENDED RESERVOIR 3ML) MISC, 1 each every 7 days, Disp: 10 each, Rfl: 11     lamoTRIgine (LAMICTAL) 100 MG tablet, Take 100 mg by mouth At Bedtime, Disp: , Rfl:      lisinopril (ZESTRIL) 2.5 MG tablet, Take 1 tablet (2.5 mg) by mouth daily, Disp: 90 tablet, Rfl: 1     loratadine (CLARITIN) 10 MG tablet, Take 1 tablet (10 mg) by mouth every morning, Disp: 30 tablet, Rfl: 9     meclizine (ANTIVERT) 25 MG tablet, Take 1 tablet (25 mg) by mouth 3 times daily as needed for dizziness, Disp: 30 tablet, Rfl: 0     nitroGLYcerin 0.2% ointment, Apply 1 click (0.25 g) topically every 24 hours for 14 days, Disp: 4 g, Rfl: 0     omeprazole (PRILOSEC) 20 MG DR capsule, TAKE 1 CAPSULE BY MOUTH EVERY DAY, Disp: 90 capsule, Rfl: 2     oxyCODONE (ROXICODONE) 5 MG tablet, Take 1 tablet (5 mg) by mouth 2 times daily as needed for pain, Disp: 12 tablet, Rfl: 0     Suvorexant (BELSOMRA) 10 MG tablet, Take 10 mg by mouth at bedtime, Disp: , Rfl:      topiramate (TOPAMAX) 25 MG tablet, Take 1 tablet (25 mg) by mouth at bedtime, Disp: 90 tablet, Rfl: 3    Allergies -   Allergies   Allergen Reactions     Acetaminophen Other (See Comments)     pt previously tried to overdose on it, PMD does not want pt taking per pt.        Social History -   Social History     Socioeconomic History "     Marital status: Single     Number of children: 0   Tobacco Use     Smoking status: Former     Current packs/day: 0.00     Average packs/day: 0.5 packs/day for 0.2 years (0.1 ttl pk-yrs)     Types: Cigarettes     Start date: 2023     Quit date: 2024     Years since quittin.7     Passive exposure: Yes     Smokeless tobacco: Never     Tobacco comments:     A pack every 3 days   Vaping Use     Vaping status: Every Day     Substances: Nicotine, Flavoring     Devices: Disposable   Substance and Sexual Activity     Alcohol use: No     Comment: She is in AA and just got her 18 month medalion (2024)     Drug use: No     Sexual activity: Yes     Partners: Female     Birth control/protection: I.U.D.     Comment: Both male and female    Other Topics Concern     Parent/sibling w/ CABG, MI or angioplasty before 65F 55M? No     Social Drivers of Health     Financial Resource Strain: Low Risk  (2024)    Financial Resource Strain      Within the past 12 months, have you or your family members you live with been unable to get utilities (heat, electricity) when it was really needed?: No   Food Insecurity: Low Risk  (2024)    Food Insecurity      Within the past 12 months, did you worry that your food would run out before you got money to buy more?: No      Within the past 12 months, did the food you bought just not last and you didn t have money to get more?: No   Transportation Needs: Low Risk  (2024)    Transportation Needs      Within the past 12 months, has lack of transportation kept you from medical appointments, getting your medicines, non-medical meetings or appointments, work, or from getting things that you need?: No   Physical Activity: Unknown (2024)    Exercise Vital Sign      Days of Exercise per Week: 2 days   Stress: No Stress Concern Present (2024)    [employer] - Occupational Stress Questionnaire      Feeling of Stress : Not at all   Social  Connections: Unknown (4/24/2024)    Social Connection and Isolation Panel [NHANES]      Frequency of Social Gatherings with Friends and Family: More than three times a week   Interpersonal Safety: Low Risk  (6/7/2024)    Interpersonal Safety      Do you feel physically and emotionally safe where you currently live?: Yes      Within the past 12 months, have you been hit, slapped, kicked or otherwise physically hurt by someone?: No      Within the past 12 months, have you been humiliated or emotionally abused in other ways by your partner or ex-partner?: No   Housing Stability: Low Risk  (4/24/2024)    Housing Stability      Do you have housing? : Yes      Are you worried about losing your housing?: No       Family History -   Family History   Problem Relation Age of Onset     Respiratory Mother         ASTHMA     Allergies Mother      Depression Mother      Chronic Obstructive Pulmonary Disease Mother      Anxiety Disorder Mother      Mental Illness Mother      Asthma Mother      Heart Disease Father         HEART VALVE REPLACEMENT     Hypertension Father      Diabetes Father      Depression Father      Anxiety Disorder Father      Mental Illness Father      Obesity Father      Sleep Apnea Maternal Grandfather      Respiratory Brother      Allergies Brother      Respiratory Sister      Allergies Sister      Depression Sister      Respiratory Sister      Allergies Sister      Depression Sister      Kidney Disease No family hx of        Review of Systems:   !.  Weight Loss: No   2. Difficulty Breathing: No   3. Difficulty Swallowing: No   4. Pain: No    Physical Exam  B/P: Data Unavailable, T: Data Unavailable, P: Data Unavailable, R: Data Unavailable  Vitals: There were no vitals taken for this visit.  BMI= There is no height or weight on file to calculate BMI.    General  Appearance - Normal  Head/Face/Scalp:    Skin - Normal    Facial Palpation - Normal    Facial Strength - Normal  Ears:    Pinna - Normal    Canal -  erythematous and scaly skin   Tympanic membrane - moist bilaterally, with some purulent exudate on the left, epitympanic retraction. left  Nose:    External - Normal    Septum - Normal    Turbinates - Normal    Middle meatus - Normal  Oral Cavity:    Lips - Normal    Floor of Mouth - Normal    Gingiva - Normal    Tongue - Normal    Buccal - Normal    Palate - Normal  Nasopharynx:    Oropharynx:    Tonsils - Normal    Tongue base - Normal    Soft palate - Normal    Posterior pharyngeal wall - Normal  Hypopharynx:  Larynx:    Epiglottis -     Aryepiglottic folds -     Arytenoids -     False vocal cords -     True vocal cords -  Neck Masses - No  Neck lymphatics - no lymphadenopathy  Thyroid - Normal  Salivary glands - Normal    Audiogram - not applicable  Radiology - not applicable   Reports:   View films:  Procedures - not applicable  Patient Education:     A/P - Divinapeter Delgadillo is a 39 year old female  Medical Decision Making Bilateral otitis externa 2. Bilateral myringitis  Will start Cipro dex drops x 10 days, then follow up      Again, thank you for allowing me to participate in the care of your patient.        Sincerely,        Jose Renteria MD

## 2024-11-20 ENCOUNTER — ANCILLARY PROCEDURE (OUTPATIENT)
Dept: ULTRASOUND IMAGING | Facility: CLINIC | Age: 39
End: 2024-11-20
Attending: STUDENT IN AN ORGANIZED HEALTH CARE EDUCATION/TRAINING PROGRAM
Payer: COMMERCIAL

## 2024-11-20 DIAGNOSIS — R74.8 ELEVATED ALKALINE PHOSPHATASE LEVEL: ICD-10-CM

## 2024-11-20 PROCEDURE — 76981 USE PARENCHYMA: CPT | Performed by: RADIOLOGY

## 2024-11-20 NOTE — PLAN OF CARE
Discharge Note    Patient Discharged to home on 5/29/2020 at 4:02 PM via [employer] transportation accompanied by staff to the door.     Patient informed of discharge instructions in AVS. Patient verbalizes understanding and denies having any questions pertaining to AVS. Patient stable at time of discharge. Patient denies SI, HI, and thoughts of self harm at time of discharge. All personal belongings returned to patient.     Jenny Larry RN  5/29/2020  4:14 PM     [Time Spent: ___ minutes] : I have spent [unfilled] minutes of time on the encounter which excludes teaching and separately reported services.

## 2024-11-22 NOTE — RESULT ENCOUNTER NOTE
Fito Delgadillo,     Unfortunately, it looks like  the results of the liver ultrasound were inconclusive/unreliable.  The radiologist did suggest potential further evaluation with an MRI, however, this can be expensive.     Before we go down the route of additional imaging, what I would recommend for now would be to make an appointment with the lab again to check your ALP levels and I will also order a GGT to see if there is a relationship between your elevated ALP levels and bones. Another thing I'm wondering about is if your ALP is elevated in the setting of your absent spleen since your AST and ALT were within normal limits.     Sincerely,     Chyna Dawson MD

## 2024-11-25 DIAGNOSIS — E11.22 TYPE 2 DIABETES MELLITUS WITH STAGE 3A CHRONIC KIDNEY DISEASE, WITH LONG-TERM CURRENT USE OF INSULIN (H): ICD-10-CM

## 2024-11-25 DIAGNOSIS — I10 ESSENTIAL (PRIMARY) HYPERTENSION: ICD-10-CM

## 2024-11-25 DIAGNOSIS — L65.9 HAIR LOSS: ICD-10-CM

## 2024-11-25 DIAGNOSIS — N18.31 TYPE 2 DIABETES MELLITUS WITH STAGE 3A CHRONIC KIDNEY DISEASE, WITH LONG-TERM CURRENT USE OF INSULIN (H): ICD-10-CM

## 2024-11-25 DIAGNOSIS — I10 ESSENTIAL HYPERTENSION: Chronic | ICD-10-CM

## 2024-11-25 DIAGNOSIS — Z79.4 TYPE 2 DIABETES MELLITUS WITH STAGE 3A CHRONIC KIDNEY DISEASE, WITH LONG-TERM CURRENT USE OF INSULIN (H): ICD-10-CM

## 2024-11-25 DIAGNOSIS — R60.0 LOWER EXTREMITY EDEMA: ICD-10-CM

## 2024-11-25 RX ORDER — BIOTIN 5 MG
1 TABLET ORAL DAILY
Qty: 100 TABLET | Refills: 1 | Status: SHIPPED | OUTPATIENT
Start: 2024-11-25

## 2024-11-25 RX ORDER — CARVEDILOL 12.5 MG/1
12.5 TABLET ORAL 2 TIMES DAILY
Qty: 60 TABLET | Refills: 11 | Status: SHIPPED | OUTPATIENT
Start: 2024-11-25

## 2024-11-25 RX ORDER — LISINOPRIL 2.5 MG/1
2.5 TABLET ORAL DAILY
Qty: 90 TABLET | Refills: 1 | Status: SHIPPED | OUTPATIENT
Start: 2024-11-25

## 2024-11-25 RX ORDER — BUMETANIDE 0.5 MG/1
0.5 TABLET ORAL DAILY
Qty: 30 TABLET | Refills: 2 | Status: SHIPPED | OUTPATIENT
Start: 2024-11-25

## 2024-11-25 NOTE — TELEPHONE ENCOUNTER
Nephrology Note: Medication Refill Request    Medication Refill Request:     Medication/Dose/Frequency: Carvedilol, Betamide   Preferred Pharmacy: [facility]  Provider:  Dr Dias                         SITUATION/BACKROUND:                 Last office visit: 12/11/23        Future office visit: 2/24/25     ASSESSMENT:     Neph Assessments:    Recent Labs:  CBC Results:  Recent Labs   Lab Test 11/07/24  1214   WBC 15.1*   RBC 3.99   HGB 11.8   HCT 37.4   MCV 94   MCH 29.6   MCHC 31.6   RDW 15.3*        Last Renal Panel:  Sodium   Date Value Ref Range Status   09/10/2024 139 135 - 145 mmol/L Final   07/08/2021 139 133 - 144 mmol/L Final     Potassium   Date Value Ref Range Status   09/10/2024 5.2 3.4 - 5.3 mmol/L Final   12/19/2022 5.1 3.4 - 5.3 mmol/L Final   07/08/2021 4.4 3.4 - 5.3 mmol/L Final     Chloride   Date Value Ref Range Status   09/10/2024 106 98 - 107 mmol/L Final   12/19/2022 112 (H) 94 - 109 mmol/L Final   07/08/2021 108 94 - 109 mmol/L Final     Carbon Dioxide   Date Value Ref Range Status   07/08/2021 26 20 - 32 mmol/L Final     Carbon Dioxide (CO2)   Date Value Ref Range Status   09/10/2024 23 22 - 29 mmol/L Final   12/19/2022 26 20 - 32 mmol/L Final     Anion Gap   Date Value Ref Range Status   09/10/2024 10 7 - 15 mmol/L Final   12/19/2022 3 3 - 14 mmol/L Final   07/08/2021 5 3 - 14 mmol/L Final     Glucose   Date Value Ref Range Status   10/29/2024 88 70 - 99 mg/dL Final   12/19/2022 258 (H) 70 - 99 mg/dL Final   07/08/2021 187 (H) 70 - 99 mg/dL Final     GLUCOSE BY METER POCT   Date Value Ref Range Status   09/18/2023 121 (H) 70 - 99 mg/dL Final     Urea Nitrogen   Date Value Ref Range Status   09/10/2024 40.9 (H) 6.0 - 20.0 mg/dL Final   12/19/2022 19 7 - 30 mg/dL Final   07/08/2021 22 7 - 30 mg/dL Final     Creatinine   Date Value Ref Range Status   09/10/2024 1.85 (H) 0.51 - 0.95 mg/dL Final   07/08/2021 1.32 (H) 0.52 - 1.04 mg/dL Final     GFR Estimate   Date Value Ref  Range Status   09/10/2024 35 (L) >60 mL/min/1.73m2 Final     Comment:     eGFR calculated using 2021 CKD-EPI equation.   07/08/2021 52 (L) >60 mL/min/[1.73_m2] Final     Comment:     Non  GFR Calc  Starting 12/18/2018, serum creatinine based estimated GFR (eGFR) will be   calculated using the Chronic Kidney Disease Epidemiology Collaboration   (CKD-EPI) equation.       Calcium   Date Value Ref Range Status   09/10/2024 9.1 8.8 - 10.4 mg/dL Final     Comment:     Reference intervals for this test were updated on 7/16/2024 to reflect our healthy population more accurately. There may be differences in the flagging of prior results with similar values performed with this method. Those prior results can be interpreted in the context of the updated reference intervals.   07/08/2021 9.2 8.5 - 10.1 mg/dL Final     Phosphorus   Date Value Ref Range Status   07/30/2024 4.3 2.5 - 4.5 mg/dL Final   06/15/2021 3.2 2.5 - 4.5 mg/dL Final     Albumin   Date Value Ref Range Status   10/29/2024 3.9 3.5 - 5.2 g/dL Final   12/19/2022 3.0 (L) 3.4 - 5.0 g/dL Final   07/08/2021 3.4 3.4 - 5.0 g/dL Final       Current Medication List:   Current Outpatient Medications (Antihypertensive, Cardiovascular, Diuretics, Beta blockers, Calcium blockers, Anticoagulants)   Medication Sig    bumetanide (BUMEX) 0.5 MG tablet Take 1 tablet (0.5 mg) by mouth daily    carvedilol (COREG) 12.5 MG tablet Take 1 tablet (12.5 mg) by mouth 2 times daily    lisinopril (ZESTRIL) 2.5 MG tablet Take 1 tablet (2.5 mg) by mouth daily     Current Outpatient Medications (Other)   Medication Sig    ACCU-CHEK GUIDE test strip Use to test blood sugar 3 times daily or as directed.    acetaminophen (TYLENOL) 325 MG tablet Take 650 mg by mouth every 4 hours as needed for pain or fever 2 tab every 4-6 hours as needed, do not exceed 9 tabs in 24hours    acetaminophen (TYLENOL) 500 MG tablet Take 1-2 tablets (500-1,000 mg) by mouth every 8 hours as needed for  "mild pain    albuterol (VENTOLIN HFA) 108 (90 Base) MCG/ACT inhaler INHALE 2 PUFFS INTO THE LUNGS EVERY SIX  HOURS AS NEEDED FOR SHORTNESS OF BREATH    alendronate (FOSAMAX) 70 MG tablet Take 1 tablet (70 mg) by mouth every 7 days    ARIPiprazole (ABILIFY) 30 MG tablet Take 30 mg by mouth daily    atorvastatin (LIPITOR) 20 MG tablet Take 1 tablet (20 mg) by mouth daily    Biotin 5 MG TABS Take 1 tablet by mouth daily    blood glucose (ACCU-CHEK GUIDE) test strip Use to test blood sugar 3 times daily or as directed.    cloZAPine (CLOZARIL) 50 MG tablet Take 150 mg by mouth at bedtime    diclofenac (VOLTAREN) 1 % topical gel Apply 2 g topically 4 times daily    docusate sodium (COLACE) 100 MG capsule Take 1 capsule (100 mg) by mouth 2 times daily as needed for constipation    FLUoxetine (PROZAC) 40 MG capsule Take 40 mg by mouth daily    glucose (BD GLUCOSE) 4 g chewable tablet Take 1 tablet by mouth every hour as needed for low blood sugar    hydrOXYzine HCl (ATARAX) 25 MG tablet Take 25 mg by mouth 3 times daily as needed for anxiety    insulin aspart (NOVOLOG VIAL) 100 UNITS/ML vial for use with continuous insulin pump  Max TDD 80    Insulin Infusion Pump (MINIMED 780G INSULIN PUMP) KIT 1 each daily SmartGuard Target: 100; Active Insulin Time: 2 hours (recommended); SmartGuardTM Warmup/Manual Mode: Suspend before low (recommended)    Insulin Infusion Pump Supplies (EXTENDED INFUSION SET 23\"/6MM) MISC 1 each every 7 days Max Total Daily Dose 70 units; Change infusion set & reservoir every 7 days (recommended)    Insulin Infusion Pump Supplies (EXTENDED RESERVOIR 3ML) MISC 1 each every 7 days    lamoTRIgine (LAMICTAL) 100 MG tablet Take 100 mg by mouth At Bedtime    loratadine (CLARITIN) 10 MG tablet Take 1 tablet (10 mg) by mouth every morning    meclizine (ANTIVERT) 25 MG tablet Take 1 tablet (25 mg) by mouth 3 times daily as needed for dizziness    nitroGLYcerin 0.2% ointment Apply 1 click (0.25 g) topically every " 24 hours for 14 days    omeprazole (PRILOSEC) 20 MG DR capsule TAKE 1 CAPSULE BY MOUTH EVERY DAY    oxyCODONE (ROXICODONE) 5 MG tablet Take 1 tablet (5 mg) by mouth 2 times daily as needed for pain    Suvorexant (BELSOMRA) 10 MG tablet Take 10 mg by mouth at bedtime    topiramate (TOPAMAX) 25 MG tablet Take 1 tablet (25 mg) by mouth at bedtime       Has patient had kidney transplant in the prior 1 year: no.   Has patient been seen the last 12 months: Yes.  Associated labs reviewed for medication: Yes    PLAN:     Medication refilled per protocol: Yes    SCOTT CHILDS RN

## 2024-11-26 ENCOUNTER — MYC MEDICAL ADVICE (OUTPATIENT)
Dept: FAMILY MEDICINE | Facility: CLINIC | Age: 39
End: 2024-11-26
Payer: COMMERCIAL

## 2024-11-26 DIAGNOSIS — R74.8 ELEVATED ALKALINE PHOSPHATASE LEVEL: Primary | ICD-10-CM

## 2024-11-27 ENCOUNTER — LAB (OUTPATIENT)
Dept: LAB | Facility: CLINIC | Age: 39
End: 2024-11-27
Payer: COMMERCIAL

## 2024-11-27 DIAGNOSIS — R74.8 ELEVATED ALKALINE PHOSPHATASE LEVEL: ICD-10-CM

## 2024-11-27 LAB
ALBUMIN SERPL BCG-MCNC: 3.6 G/DL (ref 3.5–5.2)
ALP SERPL-CCNC: 200 U/L (ref 40–150)
ALT SERPL W P-5'-P-CCNC: 27 U/L (ref 0–50)
AMYLASE SERPL-CCNC: 33 U/L (ref 28–100)
AST SERPL W P-5'-P-CCNC: 20 U/L (ref 0–45)
BILIRUB DIRECT SERPL-MCNC: <0.2 MG/DL (ref 0–0.3)
BILIRUB SERPL-MCNC: 0.2 MG/DL
GGT SERPL-CCNC: 172 U/L (ref 5–36)
LIPASE SERPL-CCNC: 41 U/L (ref 13–60)
PROT SERPL-MCNC: 7.6 G/DL (ref 6.4–8.3)

## 2024-11-27 PROCEDURE — 82150 ASSAY OF AMYLASE: CPT

## 2024-11-27 PROCEDURE — 80076 HEPATIC FUNCTION PANEL: CPT

## 2024-11-27 PROCEDURE — 36415 COLL VENOUS BLD VENIPUNCTURE: CPT

## 2024-11-27 PROCEDURE — 82977 ASSAY OF GGT: CPT

## 2024-11-27 PROCEDURE — 83690 ASSAY OF LIPASE: CPT

## 2024-12-02 NOTE — PROGRESS NOTES
Divina Delgadillo is a 39 year old female  Chief Complaint: follow up for Otitis externa. Has resolved on drops  History of Present Illness  Location: ears  Quality:drainage  Severity:moderate  Duration: 2 months    Past Medical History -   Patient Active Problem List   Diagnosis    Esophageal reflux    NAFL (nonalcoholic fatty liver)    Essential hypertension    Hyperlipidemia LDL goal <100    Stage 3 chronic kidney disease    Mucinous neoplasm of pancreas    Type 2 diabetes mellitus with stage 3 chronic kidney disease, with long-term current use of insulin (H)    Bipolar disorder (H)    Cervical high risk HPV (human papillomavirus) test positive    IUD (intrauterine device) in place    Morbid obesity (H)    Mild intermittent asthma with acute exacerbation    Nicotine abuse    Chronic leukocytosis    Acquired asplenia    Borderline personality disorder (H)    PTSD (post-traumatic stress disorder)    Long term (current) use of anticoagulants    Orthostatic hypotension    Tobacco abuse    Vitamin D insufficiency    Depressive disorder    Schizoaffective disorder, depressive type (H)    Cardiac murmur    History of DVT of arm  (deep vein thrombosis)    Insomnia, unspecified type    Ulnar neuropathy of both upper extremities    Chiari malformation type I (H)    Deep venous thrombosis of arm (H)    Neutrophilic leukocytosis    Other fracture of left femur, initial encounter for closed fracture (H)    Peroneal tendinitis of left lower extremity    Acute left ankle pain    Avascular necrosis of bone of ankle (H)    Secondary renal hyperparathyroidism (H)       Current Medications -   Current Outpatient Medications:     ACCU-CHEK GUIDE test strip, Use to test blood sugar 3 times daily or as directed., Disp: 150 strip, Rfl: 1    acetaminophen (TYLENOL) 325 MG tablet, Take 650 mg by mouth every 4 hours as needed for pain or fever 2 tab every 4-6 hours as needed, do not exceed 9 tabs in 24hours, Disp: , Rfl:     acetaminophen  (TYLENOL) 500 MG tablet, Take 1-2 tablets (500-1,000 mg) by mouth every 8 hours as needed for mild pain, Disp: 30 tablet, Rfl: 0    albuterol (VENTOLIN HFA) 108 (90 Base) MCG/ACT inhaler, INHALE 2 PUFFS INTO THE LUNGS EVERY SIX  HOURS AS NEEDED FOR SHORTNESS OF BREATH, Disp: 18 g, Rfl: 10    alendronate (FOSAMAX) 70 MG tablet, Take 1 tablet (70 mg) by mouth every 7 days, Disp: 12 tablet, Rfl: 0    ARIPiprazole (ABILIFY) 30 MG tablet, Take 30 mg by mouth daily, Disp: , Rfl:     atorvastatin (LIPITOR) 20 MG tablet, Take 1 tablet (20 mg) by mouth daily, Disp: 90 tablet, Rfl: 3    Biotin 5 MG TABS, Take 1 tablet by mouth daily, Disp: 100 tablet, Rfl: 1    blood glucose (ACCU-CHEK GUIDE) test strip, Use to test blood sugar 3 times daily or as directed., Disp: 100 strip, Rfl: 11    bumetanide (BUMEX) 0.5 MG tablet, Take 1 tablet (0.5 mg) by mouth daily, Disp: 30 tablet, Rfl: 2    carvedilol (COREG) 12.5 MG tablet, Take 1 tablet (12.5 mg) by mouth 2 times daily, Disp: 60 tablet, Rfl: 11    cloZAPine (CLOZARIL) 50 MG tablet, Take 150 mg by mouth at bedtime, Disp: , Rfl:     diclofenac (VOLTAREN) 1 % topical gel, Apply 2 g topically 4 times daily, Disp: 150 g, Rfl: 1    docusate sodium (COLACE) 100 MG capsule, Take 1 capsule (100 mg) by mouth 2 times daily as needed for constipation, Disp: 60 capsule, Rfl: 1    FLUoxetine (PROZAC) 40 MG capsule, Take 40 mg by mouth daily, Disp: , Rfl:     glucose (BD GLUCOSE) 4 g chewable tablet, Take 1 tablet by mouth every hour as needed for low blood sugar, Disp: 30 tablet, Rfl: 4    hydrOXYzine HCl (ATARAX) 25 MG tablet, Take 25 mg by mouth 3 times daily as needed for anxiety, Disp: , Rfl:     insulin aspart (NOVOLOG VIAL) 100 UNITS/ML vial, for use with continuous insulin pump  Max TDD 80, Disp: 150 mL, Rfl: 1    Insulin Infusion Pump (MINIMED 780G INSULIN PUMP) KIT, 1 each daily SmartGuard Target: 100; Active Insulin Time: 2 hours (recommended); SmartGuardTM Warmup/Manual Mode: Suspend  "before low (recommended), Disp: 1 kit, Rfl: 0    Insulin Infusion Pump Supplies (EXTENDED INFUSION SET 23\"/6MM) MISC, 1 each every 7 days Max Total Daily Dose 70 units; Change infusion set & reservoir every 7 days (recommended), Disp: 10 each, Rfl: 6    Insulin Infusion Pump Supplies (EXTENDED RESERVOIR 3ML) MISC, 1 each every 7 days, Disp: 10 each, Rfl: 11    lamoTRIgine (LAMICTAL) 100 MG tablet, Take 100 mg by mouth At Bedtime, Disp: , Rfl:     lisinopril (ZESTRIL) 2.5 MG tablet, Take 1 tablet (2.5 mg) by mouth daily, Disp: 90 tablet, Rfl: 1    loratadine (CLARITIN) 10 MG tablet, Take 1 tablet (10 mg) by mouth every morning, Disp: 30 tablet, Rfl: 9    meclizine (ANTIVERT) 25 MG tablet, Take 1 tablet (25 mg) by mouth 3 times daily as needed for dizziness, Disp: 30 tablet, Rfl: 0    nitroGLYcerin 0.2% ointment, Apply 1 click (0.25 g) topically every 24 hours for 14 days, Disp: 4 g, Rfl: 0    omeprazole (PRILOSEC) 20 MG DR capsule, TAKE 1 CAPSULE BY MOUTH EVERY DAY, Disp: 90 capsule, Rfl: 2    oxyCODONE (ROXICODONE) 5 MG tablet, Take 1 tablet (5 mg) by mouth 2 times daily as needed for pain, Disp: 12 tablet, Rfl: 0    Suvorexant (BELSOMRA) 10 MG tablet, Take 10 mg by mouth at bedtime, Disp: , Rfl:     topiramate (TOPAMAX) 25 MG tablet, Take 1 tablet (25 mg) by mouth at bedtime, Disp: 90 tablet, Rfl: 3    Allergies -   Allergies   Allergen Reactions    Acetaminophen Other (See Comments)     pt previously tried to overdose on it, PMD does not want pt taking per pt.        Social History -   Social History     Socioeconomic History    Marital status: Single    Number of children: 0   Tobacco Use    Smoking status: Former     Current packs/day: 0.00     Average packs/day: 0.5 packs/day for 0.2 years (0.1 ttl pk-yrs)     Types: Cigarettes     Start date: 2023     Quit date: 2024     Years since quittin.7     Passive exposure: Yes    Smokeless tobacco: Never    Tobacco comments:     A pack every 3 days "   Vaping Use    Vaping status: Every Day    Substances: Nicotine, Flavoring    Devices: Disposable   Substance and Sexual Activity    Alcohol use: No     Comment: She is in AA and just got her 18 month medalion (03/2024)    Drug use: No    Sexual activity: Yes     Partners: Female     Birth control/protection: I.U.D.     Comment: Both male and female    Other Topics Concern    Parent/sibling w/ CABG, MI or angioplasty before 65F 55M? No     Social Drivers of Health     Financial Resource Strain: Low Risk  (4/24/2024)    Financial Resource Strain     Within the past 12 months, have you or your family members you live with been unable to get utilities (heat, electricity) when it was really needed?: No   Food Insecurity: Low Risk  (4/24/2024)    Food Insecurity     Within the past 12 months, did you worry that your food would run out before you got money to buy more?: No     Within the past 12 months, did the food you bought just not last and you didn t have money to get more?: No   Transportation Needs: Low Risk  (4/24/2024)    Transportation Needs     Within the past 12 months, has lack of transportation kept you from medical appointments, getting your medicines, non-medical meetings or appointments, work, or from getting things that you need?: No   Physical Activity: Unknown (4/24/2024)    Exercise Vital Sign     Days of Exercise per Week: 2 days   Stress: No Stress Concern Present (4/24/2024)    [employer] [employer] - Occupational Stress Questionnaire     Feeling of Stress : Not at all   Social Connections: Unknown (4/24/2024)    Social Connection and Isolation Panel [NHANES]     Frequency of Social Gatherings with Friends and Family: More than three times a week   Interpersonal Safety: Low Risk  (6/7/2024)    Interpersonal Safety     Do you feel physically and emotionally safe where you currently live?: Yes     Within the past 12 months, have you been hit, slapped, kicked or otherwise  physically hurt by someone?: No     Within the past 12 months, have you been humiliated or emotionally abused in other ways by your partner or ex-partner?: No   Housing Stability: Low Risk  (4/24/2024)    Housing Stability     Do you have housing? : Yes     Are you worried about losing your housing?: No       Family History -   Family History   Problem Relation Age of Onset    Respiratory Mother         ASTHMA    Allergies Mother     Depression Mother     Chronic Obstructive Pulmonary Disease Mother     Anxiety Disorder Mother     Mental Illness Mother     Asthma Mother     Heart Disease Father         HEART VALVE REPLACEMENT    Hypertension Father     Diabetes Father     Depression Father     Anxiety Disorder Father     Mental Illness Father     Obesity Father     Respiratory Sister     Allergies Sister     Depression Sister     Respiratory Sister     Allergies Sister     Depression Sister     Respiratory Brother     Allergies Brother     Sleep Apnea Maternal Grandfather     Kidney Disease No family hx of        Review of Systems:   !.  Weight Loss: No   2. Difficulty Breathing: No   3. Difficulty Swallowing: No   4. Pain: No    Physical Exam  B/P: Data Unavailable, T: Data Unavailable, P: Data Unavailable, R: Data Unavailable  Vitals: There were no vitals taken for this visit.  BMI= There is no height or weight on file to calculate BMI.    General  Appearance - Normal  Head/Face/Scalp:    Skin - Normal    Facial Palpation - Normal    Facial Strength - Normal  Ears:    Pinna - Normal    Canal - erythematous and scaly skin   Tympanic membrane - moist bilaterally, with some purulent exudate on the left, epitympanic retraction. left  Nose:    External - Normal    Septum - Normal    Turbinates - Normal    Middle meatus - Normal  Oral Cavity:    Lips - Normal    Floor of Mouth - Normal    Gingiva - Normal    Tongue - Normal    Buccal - Normal    Palate - Normal  Nasopharynx:    Oropharynx:    Tonsils - Normal    Tongue  base - Normal    Soft palate - Normal    Posterior pharyngeal wall - Normal  Hypopharynx:  Larynx:    Epiglottis -     Aryepiglottic folds -     Arytenoids -     False vocal cords -     True vocal cords -  Neck Masses - No  Neck lymphatics - no lymphadenopathy  Thyroid - Normal  Salivary glands - Normal    Audiogram - not applicable  Radiology - not applicable   Reports:   View films:  Procedures - not applicable  Patient Education:     A/P Katelynn Murcia HOANG Finnclarence is a 39 year old female  Medical Decision Making Bilateral otitis externa has resolved on drops  Cleared for amplification  Will start Cipro dex drops x 10 days, then follow up

## 2024-12-04 ENCOUNTER — OFFICE VISIT (OUTPATIENT)
Dept: OTOLARYNGOLOGY | Facility: CLINIC | Age: 39
End: 2024-12-04
Payer: COMMERCIAL

## 2024-12-04 VITALS
HEART RATE: 71 BPM | DIASTOLIC BLOOD PRESSURE: 69 MMHG | SYSTOLIC BLOOD PRESSURE: 116 MMHG | BODY MASS INDEX: 39.48 KG/M2 | WEIGHT: 230 LBS | TEMPERATURE: 98.4 F

## 2024-12-04 DIAGNOSIS — H60.393 INFECTIVE OTITIS EXTERNA, BILATERAL: Primary | ICD-10-CM

## 2024-12-04 DIAGNOSIS — E11.22 TYPE 2 DIABETES MELLITUS WITH STAGE 3A CHRONIC KIDNEY DISEASE, WITH LONG-TERM CURRENT USE OF INSULIN (H): Primary | ICD-10-CM

## 2024-12-04 DIAGNOSIS — N18.31 TYPE 2 DIABETES MELLITUS WITH STAGE 3A CHRONIC KIDNEY DISEASE, WITH LONG-TERM CURRENT USE OF INSULIN (H): Primary | ICD-10-CM

## 2024-12-04 DIAGNOSIS — Z79.4 TYPE 2 DIABETES MELLITUS WITH STAGE 3A CHRONIC KIDNEY DISEASE, WITH LONG-TERM CURRENT USE OF INSULIN (H): Primary | ICD-10-CM

## 2024-12-04 PROCEDURE — 99213 OFFICE O/P EST LOW 20 MIN: CPT | Performed by: OTOLARYNGOLOGY

## 2024-12-04 ASSESSMENT — PAIN SCALES - GENERAL: PAINLEVEL_OUTOF10: NO PAIN (0)

## 2024-12-04 NOTE — LETTER
12/4/2024      Divina Delgadillo  [id number] [address] [address]      Dear Colleague,    Thank you for referring your patient, Divina Delgadillo, to the [facility] [place]. Please see a copy of my visit note below.    Divina Delgadillo is a 39 year old female  Chief Complaint: follow up for Otitis externa. Has resolved on drops  History of Present Illness  Location: ears  Quality:drainage  Severity:moderate  Duration: 2 months    Past Medical History -   Patient Active Problem List   Diagnosis     Esophageal reflux     NAFL (nonalcoholic fatty liver)     Essential hypertension     Hyperlipidemia LDL goal <100     Stage 3 chronic kidney disease     Mucinous neoplasm of pancreas     Type 2 diabetes mellitus with stage 3 chronic kidney disease, with long-term current use of insulin (H)     Bipolar disorder (H)     Cervical high risk HPV (human papillomavirus) test positive     IUD (intrauterine device) in place     Morbid obesity (H)     Mild intermittent asthma with acute exacerbation     Nicotine abuse     Chronic leukocytosis     Acquired asplenia     Borderline personality disorder (H)     PTSD (post-traumatic stress disorder)     Long term (current) use of anticoagulants     Orthostatic hypotension     Tobacco abuse     Vitamin D insufficiency     Depressive disorder     Schizoaffective disorder, depressive type (H)     Cardiac murmur     History of DVT of arm  (deep vein thrombosis)     Insomnia, unspecified type     Ulnar neuropathy of both upper extremities     Chiari malformation type I (H)     Deep venous thrombosis of arm (H)     Neutrophilic leukocytosis     Other fracture of left femur, initial encounter for closed fracture (H)     Peroneal tendinitis of left lower extremity     Acute left ankle pain     Avascular necrosis of bone of ankle (H)     Secondary renal hyperparathyroidism (H)       Current Medications -   Current Outpatient Medications:      ACCU-CHEK GUIDE test strip,  Use to test blood sugar 3 times daily or as directed., Disp: 150 strip, Rfl: 1     acetaminophen (TYLENOL) 325 MG tablet, Take 650 mg by mouth every 4 hours as needed for pain or fever 2 tab every 4-6 hours as needed, do not exceed 9 tabs in 24hours, Disp: , Rfl:      acetaminophen (TYLENOL) 500 MG tablet, Take 1-2 tablets (500-1,000 mg) by mouth every 8 hours as needed for mild pain, Disp: 30 tablet, Rfl: 0     albuterol (VENTOLIN HFA) 108 (90 Base) MCG/ACT inhaler, INHALE 2 PUFFS INTO THE LUNGS EVERY SIX  HOURS AS NEEDED FOR SHORTNESS OF BREATH, Disp: 18 g, Rfl: 10     alendronate (FOSAMAX) 70 MG tablet, Take 1 tablet (70 mg) by mouth every 7 days, Disp: 12 tablet, Rfl: 0     ARIPiprazole (ABILIFY) 30 MG tablet, Take 30 mg by mouth daily, Disp: , Rfl:      atorvastatin (LIPITOR) 20 MG tablet, Take 1 tablet (20 mg) by mouth daily, Disp: 90 tablet, Rfl: 3     Biotin 5 MG TABS, Take 1 tablet by mouth daily, Disp: 100 tablet, Rfl: 1     blood glucose (ACCU-CHEK GUIDE) test strip, Use to test blood sugar 3 times daily or as directed., Disp: 100 strip, Rfl: 11     bumetanide (BUMEX) 0.5 MG tablet, Take 1 tablet (0.5 mg) by mouth daily, Disp: 30 tablet, Rfl: 2     carvedilol (COREG) 12.5 MG tablet, Take 1 tablet (12.5 mg) by mouth 2 times daily, Disp: 60 tablet, Rfl: 11     cloZAPine (CLOZARIL) 50 MG tablet, Take 150 mg by mouth at bedtime, Disp: , Rfl:      diclofenac (VOLTAREN) 1 % topical gel, Apply 2 g topically 4 times daily, Disp: 150 g, Rfl: 1     docusate sodium (COLACE) 100 MG capsule, Take 1 capsule (100 mg) by mouth 2 times daily as needed for constipation, Disp: 60 capsule, Rfl: 1     FLUoxetine (PROZAC) 40 MG capsule, Take 40 mg by mouth daily, Disp: , Rfl:      glucose (BD GLUCOSE) 4 g chewable tablet, Take 1 tablet by mouth every hour as needed for low blood sugar, Disp: 30 tablet, Rfl: 4     hydrOXYzine HCl (ATARAX) 25 MG tablet, Take 25 mg by mouth 3 times daily as needed for anxiety, Disp: , Rfl:       "insulin aspart (NOVOLOG VIAL) 100 UNITS/ML vial, for use with continuous insulin pump  Max TDD 80, Disp: 150 mL, Rfl: 1     Insulin Infusion Pump (MINIMED 780G INSULIN PUMP) KIT, 1 each daily SmartGuard Target: 100; Active Insulin Time: 2 hours (recommended); SmartGuardTM Warmup/Manual Mode: Suspend before low (recommended), Disp: 1 kit, Rfl: 0     Insulin Infusion Pump Supplies (EXTENDED INFUSION SET 23\"/6MM) MISC, 1 each every 7 days Max Total Daily Dose 70 units; Change infusion set & reservoir every 7 days (recommended), Disp: 10 each, Rfl: 6     Insulin Infusion Pump Supplies (EXTENDED RESERVOIR 3ML) MISC, 1 each every 7 days, Disp: 10 each, Rfl: 11     lamoTRIgine (LAMICTAL) 100 MG tablet, Take 100 mg by mouth At Bedtime, Disp: , Rfl:      lisinopril (ZESTRIL) 2.5 MG tablet, Take 1 tablet (2.5 mg) by mouth daily, Disp: 90 tablet, Rfl: 1     loratadine (CLARITIN) 10 MG tablet, Take 1 tablet (10 mg) by mouth every morning, Disp: 30 tablet, Rfl: 9     meclizine (ANTIVERT) 25 MG tablet, Take 1 tablet (25 mg) by mouth 3 times daily as needed for dizziness, Disp: 30 tablet, Rfl: 0     nitroGLYcerin 0.2% ointment, Apply 1 click (0.25 g) topically every 24 hours for 14 days, Disp: 4 g, Rfl: 0     omeprazole (PRILOSEC) 20 MG DR capsule, TAKE 1 CAPSULE BY MOUTH EVERY DAY, Disp: 90 capsule, Rfl: 2     oxyCODONE (ROXICODONE) 5 MG tablet, Take 1 tablet (5 mg) by mouth 2 times daily as needed for pain, Disp: 12 tablet, Rfl: 0     Suvorexant (BELSOMRA) 10 MG tablet, Take 10 mg by mouth at bedtime, Disp: , Rfl:      topiramate (TOPAMAX) 25 MG tablet, Take 1 tablet (25 mg) by mouth at bedtime, Disp: 90 tablet, Rfl: 3    Allergies -   Allergies   Allergen Reactions     Acetaminophen Other (See Comments)     pt previously tried to overdose on it, PMD does not want pt taking per pt.        Social History -   Social History     Socioeconomic History     Marital status: Single     Number of children: 0   Tobacco Use     Smoking " status: Former     Current packs/day: 0.00     Average packs/day: 0.5 packs/day for 0.2 years (0.1 ttl pk-yrs)     Types: Cigarettes     Start date: 2023     Quit date: 2024     Years since quittin.7     Passive exposure: Yes     Smokeless tobacco: Never     Tobacco comments:     A pack every 3 days   Vaping Use     Vaping status: Every Day     Substances: Nicotine, Flavoring     Devices: Disposable   Substance and Sexual Activity     Alcohol use: No     Comment: She is in AA and just got her 18 month medalion (2024)     Drug use: No     Sexual activity: Yes     Partners: Female     Birth control/protection: I.U.D.     Comment: Both male and female    Other Topics Concern     Parent/sibling w/ CABG, MI or angioplasty before 65F 55M? No     Social Drivers of Health     Financial Resource Strain: Low Risk  (2024)    Financial Resource Strain      Within the past 12 months, have you or your family members you live with been unable to get utilities (heat, electricity) when it was really needed?: No   Food Insecurity: Low Risk  (2024)    Food Insecurity      Within the past 12 months, did you worry that your food would run out before you got money to buy more?: No      Within the past 12 months, did the food you bought just not last and you didn t have money to get more?: No   Transportation Needs: Low Risk  (2024)    Transportation Needs      Within the past 12 months, has lack of transportation kept you from medical appointments, getting your medicines, non-medical meetings or appointments, work, or from getting things that you need?: No   Physical Activity: Unknown (2024)    Exercise Vital Sign      Days of Exercise per Week: 2 days   Stress: No Stress Concern Present (2024)    [employer] [place] [employer] - Occupational Stress Questionnaire      Feeling of Stress : Not at all   Social Connections: Unknown (2024)    Social Connection and Isolation Panel  [NHANES]      Frequency of Social Gatherings with Friends and Family: More than three times a week   Interpersonal Safety: Low Risk  (6/7/2024)    Interpersonal Safety      Do you feel physically and emotionally safe where you currently live?: Yes      Within the past 12 months, have you been hit, slapped, kicked or otherwise physically hurt by someone?: No      Within the past 12 months, have you been humiliated or emotionally abused in other ways by your partner or ex-partner?: No   Housing Stability: Low Risk  (4/24/2024)    Housing Stability      Do you have housing? : Yes      Are you worried about losing your housing?: No       Family History -   Family History   Problem Relation Age of Onset     Respiratory Mother         ASTHMA     Allergies Mother      Depression Mother      Chronic Obstructive Pulmonary Disease Mother      Anxiety Disorder Mother      Mental Illness Mother      Asthma Mother      Heart Disease Father         HEART VALVE REPLACEMENT     Hypertension Father      Diabetes Father      Depression Father      Anxiety Disorder Father      Mental Illness Father      Obesity Father      Respiratory Sister      Allergies Sister      Depression Sister      Respiratory Sister      Allergies Sister      Depression Sister      Respiratory Brother      Allergies Brother      Sleep Apnea Maternal Grandfather      Kidney Disease No family hx of        Review of Systems:   !.  Weight Loss: No   2. Difficulty Breathing: No   3. Difficulty Swallowing: No   4. Pain: No    Physical Exam  B/P: Data Unavailable, T: Data Unavailable, P: Data Unavailable, R: Data Unavailable  Vitals: There were no vitals taken for this visit.  BMI= There is no height or weight on file to calculate BMI.    General  Appearance - Normal  Head/Face/Scalp:    Skin - Normal    Facial Palpation - Normal    Facial Strength - Normal  Ears:    Pinna - Normal    Canal - erythematous and scaly skin   Tympanic membrane - moist bilaterally, with  some purulent exudate on the left, epitympanic retraction. left  Nose:    External - Normal    Septum - Normal    Turbinates - Normal    Middle meatus - Normal  Oral Cavity:    Lips - Normal    Floor of Mouth - Normal    Gingiva - Normal    Tongue - Normal    Buccal - Normal    Palate - Normal  Nasopharynx:    Oropharynx:    Tonsils - Normal    Tongue base - Normal    Soft palate - Normal    Posterior pharyngeal wall - Normal  Hypopharynx:  Larynx:    Epiglottis -     Aryepiglottic folds -     Arytenoids -     False vocal cords -     True vocal cords -  Neck Masses - No  Neck lymphatics - no lymphadenopathy  Thyroid - Normal  Salivary glands - Normal    Audiogram - not applicable  Radiology - not applicable   Reports:   View films:  Procedures - not applicable  Patient Education:     A/P - Divinapeter Delgadillo is a 39 year old female  Medical Decision Making Bilateral otitis externa has resolved on drops  Cleared for amplification  Will start Cipro dex drops x 10 days, then follow up      Again, thank you for allowing me to participate in the care of your patient.        Sincerely,        Jose Renteria MD

## 2024-12-04 NOTE — NURSING NOTE
"Initial /69 (BP Location: Right arm, Patient Position: Chair, Cuff Size: Adult Large)   Pulse 71   Temp 98.4  F (36.9  C) (Tympanic)   Wt 104.3 kg (230 lb)   BMI 39.48 kg/m   Estimated body mass index is 39.48 kg/m  as calculated from the following:    Height as of 8/26/24: 1.626 m (5' 4\").    Weight as of this encounter: 104.3 kg (230 lb). .  Juen Burroughs LPN    "

## 2024-12-04 NOTE — PROGRESS NOTES
Divina Payton has an upcoming lab appointment with us. Please review and place orders if needed.  Thank you,  [facility]

## 2024-12-10 ENCOUNTER — TELEPHONE (OUTPATIENT)
Dept: FAMILY MEDICINE | Facility: CLINIC | Age: 39
End: 2024-12-10
Payer: COMMERCIAL

## 2024-12-10 NOTE — TELEPHONE ENCOUNTER
General Call    Contacts       Contact Date/Time Type Contact Phone/Fax    12/10/2024 11:32 AM CST Phone (Incoming) Divina Delgadillo (Self) [phone] (M)          Reason for Call:  Please update Pt's MRI order to be done with General Anesthsia. It currrently has IV sedation.     What are your questions or concerns:  MRI order needs to have GA instead of IV sedation    Date of last appointment with provider: Orders were put in 12/3    Could we send this information to you in [facility] [facility] or would you prefer to receive a phone call?:   Patient would prefer a phone call   Okay to leave a detailed message?: Yes at Cell number on file:    Telephone Information:   Mobile [phone]

## 2024-12-11 ENCOUNTER — LAB (OUTPATIENT)
Dept: LAB | Facility: CLINIC | Age: 39
End: 2024-12-11
Payer: COMMERCIAL

## 2024-12-11 ENCOUNTER — OFFICE VISIT (OUTPATIENT)
Dept: PODIATRY | Facility: CLINIC | Age: 39
End: 2024-12-11
Payer: COMMERCIAL

## 2024-12-11 DIAGNOSIS — N18.31 TYPE 2 DIABETES MELLITUS WITH STAGE 3A CHRONIC KIDNEY DISEASE, WITH LONG-TERM CURRENT USE OF INSULIN (H): ICD-10-CM

## 2024-12-11 DIAGNOSIS — Z79.899 HIGH RISK MEDICATION USE: ICD-10-CM

## 2024-12-11 DIAGNOSIS — Z79.4 TYPE 2 DIABETES MELLITUS WITH STAGE 3A CHRONIC KIDNEY DISEASE, WITH LONG-TERM CURRENT USE OF INSULIN (H): ICD-10-CM

## 2024-12-11 DIAGNOSIS — E11.22 TYPE 2 DIABETES MELLITUS WITH STAGE 3A CHRONIC KIDNEY DISEASE, WITH LONG-TERM CURRENT USE OF INSULIN (H): ICD-10-CM

## 2024-12-11 DIAGNOSIS — F25.0 SCHIZOAFFECTIVE DISORDER, BIPOLAR TYPE (H): ICD-10-CM

## 2024-12-11 LAB
ANION GAP SERPL CALCULATED.3IONS-SCNC: 13 MMOL/L (ref 7–15)
BASOPHILS # BLD AUTO: 0.1 10E3/UL (ref 0–0.2)
BASOPHILS NFR BLD AUTO: 1 %
BUN SERPL-MCNC: 36.1 MG/DL (ref 6–20)
CALCIUM SERPL-MCNC: 8.9 MG/DL (ref 8.8–10.4)
CHLORIDE SERPL-SCNC: 106 MMOL/L (ref 98–107)
CREAT SERPL-MCNC: 1.74 MG/DL (ref 0.51–0.95)
EGFRCR SERPLBLD CKD-EPI 2021: 38 ML/MIN/1.73M2
EOSINOPHIL # BLD AUTO: 0.3 10E3/UL (ref 0–0.7)
EOSINOPHIL NFR BLD AUTO: 2 %
ERYTHROCYTE [DISTWIDTH] IN BLOOD BY AUTOMATED COUNT: 15.3 % (ref 10–15)
GLUCOSE SERPL-MCNC: 145 MG/DL (ref 70–99)
HCO3 SERPL-SCNC: 22 MMOL/L (ref 22–29)
HCT VFR BLD AUTO: 36.6 % (ref 35–47)
HGB BLD-MCNC: 11.2 G/DL (ref 11.7–15.7)
IMM GRANULOCYTES # BLD: 0.1 10E3/UL
IMM GRANULOCYTES NFR BLD: 0 %
LYMPHOCYTES # BLD AUTO: 3 10E3/UL (ref 0.8–5.3)
LYMPHOCYTES NFR BLD AUTO: 21 %
MCH RBC QN AUTO: 28.9 PG (ref 26.5–33)
MCHC RBC AUTO-ENTMCNC: 30.6 G/DL (ref 31.5–36.5)
MCV RBC AUTO: 95 FL (ref 78–100)
MONOCYTES # BLD AUTO: 0.9 10E3/UL (ref 0–1.3)
MONOCYTES NFR BLD AUTO: 6 %
NEUTROPHILS # BLD AUTO: 10.4 10E3/UL (ref 1.6–8.3)
NEUTROPHILS NFR BLD AUTO: 71 %
PLATELET # BLD AUTO: 405 10E3/UL (ref 150–450)
POTASSIUM SERPL-SCNC: 4.9 MMOL/L (ref 3.4–5.3)
RBC # BLD AUTO: 3.87 10E6/UL (ref 3.8–5.2)
SODIUM SERPL-SCNC: 141 MMOL/L (ref 135–145)
WBC # BLD AUTO: 14.7 10E3/UL (ref 4–11)

## 2024-12-11 PROCEDURE — 36415 COLL VENOUS BLD VENIPUNCTURE: CPT

## 2024-12-11 PROCEDURE — 80048 BASIC METABOLIC PNL TOTAL CA: CPT

## 2024-12-11 PROCEDURE — 85025 COMPLETE CBC W/AUTO DIFF WBC: CPT

## 2024-12-11 NOTE — PROGRESS NOTES
PATIENT HISTORY:  Divina Delgadillo is a 39 year old female who presents to clinic for a diabetic foot evaluation.  *** The patient relates some numbness to the feet.  The patient denies any redness, swelling or open sores on both feet.  The patient relates blood sugars are within normal limits.  The patient was sent by  *** for consultation on the {RIGHT /LEFT:666219} foot.       REVIEW OF SYSTEMS:  Constitutional, HEENT, cardiovascular, pulmonary, GI, , musculoskeletal, neuro, skin, endocrine and psych systems are negative, except as otherwise noted.     PAST MEDICAL HISTORY:   Past Medical History:   Diagnosis Date    Acquired asplenia     Acute pain of left knee 03/22/2019    Acute-on-chronic kidney injury (H) 03/22/2019    ARF (acute renal failure) (H) 03/23/2019    Asthma     Bipolar disorder (H)     Cardiac murmur     Cervical high risk HPV (human papillomavirus) test positive 06/20/2017    Chiari malformation type I (H)     Chronic bilateral low back pain without sciatica     Deep venous thrombosis of arm (H) 01/06/2017    ultrasound 1/6/2017-  venous thrombus in the antecubital fossa region and the left forearm as described    Depressive disorder     DVT (deep venous thrombosis) (H)     DVT of upper extremity (deep vein thrombosis) (H) 2021    Esophageal reflux     GERD    Hypertension     Insomnia     Leukocytosis     Mild intermittent asthma     Morbid obesity (H)     Mucinous neoplasm of pancreas     NAFL (nonalcoholic fatty liver)     Neck pain     Nicotine abuse     Other specified types of schizophrenia, unspecified condition     Schizoaffective disorder, depressive type (H)     Stage 3a chronic kidney disease (CKD) (H)     Type 2 diabetes mellitus (H)     Ulnar neuropathy of both upper extremities     Vitamin D insufficiency         PAST SURGICAL HISTORY:   Past Surgical History:   Procedure Laterality Date    ADRENALECTOMY Left 2015    BIOPSY      BREAST SURGERY  2013    COLONOSCOPY      ENT  SURGERY  10/01/2000    Tympanoplasty    IR LIVER BIOPSY PERCUTANEOUS  11/14/2019    PANCREATECTOMY PARTIAL  2015    PERCUTANEOUS BIOPSY LIVER Right 11/14/2019    Procedure: Percutaneous Liver Biopsy;  Surgeon: Sean Guzman PA-C;  Location: [facility] OR    SPLENECTOMY  2015    TONSILLECTOMY  10/01/2000    Tonsillectomy        MEDICATIONS:   Current Outpatient Medications:     ACCU-CHEK GUIDE test strip, Use to test blood sugar 3 times daily or as directed., Disp: 150 strip, Rfl: 1    acetaminophen (TYLENOL) 325 MG tablet, Take 650 mg by mouth every 4 hours as needed for pain or fever 2 tab every 4-6 hours as needed, do not exceed 9 tabs in 24hours, Disp: , Rfl:     acetaminophen (TYLENOL) 500 MG tablet, Take 1-2 tablets (500-1,000 mg) by mouth every 8 hours as needed for mild pain, Disp: 30 tablet, Rfl: 0    albuterol (VENTOLIN HFA) 108 (90 Base) MCG/ACT inhaler, INHALE 2 PUFFS INTO THE LUNGS EVERY SIX  HOURS AS NEEDED FOR SHORTNESS OF BREATH, Disp: 18 g, Rfl: 10    alendronate (FOSAMAX) 70 MG tablet, Take 1 tablet (70 mg) by mouth every 7 days, Disp: 12 tablet, Rfl: 0    ARIPiprazole (ABILIFY) 30 MG tablet, Take 30 mg by mouth daily, Disp: , Rfl:     atorvastatin (LIPITOR) 20 MG tablet, Take 1 tablet (20 mg) by mouth daily, Disp: 90 tablet, Rfl: 3    Biotin 5 MG TABS, Take 1 tablet by mouth daily, Disp: 100 tablet, Rfl: 1    blood glucose (ACCU-CHEK GUIDE) test strip, Use to test blood sugar 3 times daily or as directed., Disp: 100 strip, Rfl: 11    bumetanide (BUMEX) 0.5 MG tablet, Take 1 tablet (0.5 mg) by mouth daily, Disp: 30 tablet, Rfl: 2    carvedilol (COREG) 12.5 MG tablet, Take 1 tablet (12.5 mg) by mouth 2 times daily, Disp: 60 tablet, Rfl: 11    cloZAPine (CLOZARIL) 50 MG tablet, Take 150 mg by mouth at bedtime, Disp: , Rfl:     diclofenac (VOLTAREN) 1 % topical gel, Apply 2 g topically 4 times daily, Disp: 150 g, Rfl: 1    docusate sodium (COLACE) 100 MG capsule, Take 1 capsule (100 mg) by mouth 2  "times daily as needed for constipation, Disp: 60 capsule, Rfl: 1    FLUoxetine (PROZAC) 40 MG capsule, Take 40 mg by mouth daily, Disp: , Rfl:     glucose (BD GLUCOSE) 4 g chewable tablet, Take 1 tablet by mouth every hour as needed for low blood sugar, Disp: 30 tablet, Rfl: 4    hydrOXYzine HCl (ATARAX) 25 MG tablet, Take 25 mg by mouth 3 times daily as needed for anxiety, Disp: , Rfl:     insulin aspart (NOVOLOG VIAL) 100 UNITS/ML vial, for use with continuous insulin pump  Max TDD 80, Disp: 150 mL, Rfl: 1    Insulin Infusion Pump (MINIMED 780G INSULIN PUMP) KIT, 1 each daily SmartGuard Target: 100; Active Insulin Time: 2 hours (recommended); SmartGuardTM Warmup/Manual Mode: Suspend before low (recommended), Disp: 1 kit, Rfl: 0    Insulin Infusion Pump Supplies (EXTENDED INFUSION SET 23\"/6MM) MISC, 1 each every 7 days Max Total Daily Dose 70 units; Change infusion set & reservoir every 7 days (recommended), Disp: 10 each, Rfl: 6    Insulin Infusion Pump Supplies (EXTENDED RESERVOIR 3ML) MISC, 1 each every 7 days, Disp: 10 each, Rfl: 11    lamoTRIgine (LAMICTAL) 100 MG tablet, Take 100 mg by mouth At Bedtime, Disp: , Rfl:     lisinopril (ZESTRIL) 2.5 MG tablet, Take 1 tablet (2.5 mg) by mouth daily, Disp: 90 tablet, Rfl: 1    loratadine (CLARITIN) 10 MG tablet, Take 1 tablet (10 mg) by mouth every morning, Disp: 30 tablet, Rfl: 9    meclizine (ANTIVERT) 25 MG tablet, Take 1 tablet (25 mg) by mouth 3 times daily as needed for dizziness, Disp: 30 tablet, Rfl: 0    nitroGLYcerin 0.2% ointment, Apply 1 click (0.25 g) topically every 24 hours for 14 days, Disp: 4 g, Rfl: 0    omeprazole (PRILOSEC) 20 MG DR capsule, TAKE 1 CAPSULE BY MOUTH EVERY DAY, Disp: 90 capsule, Rfl: 2    oxyCODONE (ROXICODONE) 5 MG tablet, Take 1 tablet (5 mg) by mouth 2 times daily as needed for pain, Disp: 12 tablet, Rfl: 0    Suvorexant (BELSOMRA) 10 MG tablet, Take 10 mg by mouth at bedtime, Disp: , Rfl:     topiramate (TOPAMAX) 25 MG tablet, " Take 1 tablet (25 mg) by mouth at bedtime, Disp: 90 tablet, Rfl: 3     ALLERGIES:    Allergies   Allergen Reactions    Acetaminophen Other (See Comments)     pt previously tried to overdose on it, PMD does not want pt taking per pt.         SOCIAL HISTORY:   Social History     Socioeconomic History    Marital status: Single     Spouse name: Not on file    Number of children: 0    Years of education: Not on file    Highest education level: Not on file   Occupational History    Not on file   Tobacco Use    Smoking status: Every Day     Current packs/day: 0.00     Average packs/day: 0.5 packs/day for 0.2 years (0.1 ttl pk-yrs)     Types: Cigarettes, Vaping Device     Start date: 2023     Last attempt to quit: 2024     Years since quittin.8     Passive exposure: Yes    Smokeless tobacco: Never    Tobacco comments:     E- cig   Vaping Use    Vaping status: Every Day    Substances: Nicotine, Flavoring    Devices: Disposable   Substance and Sexual Activity    Alcohol use: No     Comment: sober since     Drug use: No    Sexual activity: Yes     Partners: Female     Birth control/protection: I.U.D.     Comment: Both male and female    Other Topics Concern    Parent/sibling w/ CABG, MI or angioplasty before 65F 55M? No   Social History Narrative    Not on file     Social Drivers of Health     Financial Resource Strain: Low Risk  (2024)    Financial Resource Strain     Within the past 12 months, have you or your family members you live with been unable to get utilities (heat, electricity) when it was really needed?: No   Food Insecurity: Low Risk  (2024)    Food Insecurity     Within the past 12 months, did you worry that your food would run out before you got money to buy more?: No     Within the past 12 months, did the food you bought just not last and you didn t have money to get more?: No   Transportation Needs: Low Risk  (2024)    Transportation Needs     Within the past 12 months, has  lack of transportation kept you from medical appointments, getting your medicines, non-medical meetings or appointments, work, or from getting things that you need?: No   Physical Activity: Unknown (4/24/2024)    Exercise Vital Sign     Days of Exercise per Week: 2 days     Minutes of Exercise per Session: Not on file   Stress: No Stress Concern Present (4/24/2024)    [employer] - Occupational Stress Questionnaire     Feeling of Stress : Not at all   Social Connections: Unknown (4/24/2024)    Social Connection and Isolation Panel [NHANES]     Frequency of Communication with Friends and Family: Not on file     Frequency of Social Gatherings with Friends and Family: More than three times a week     Attends Anabaptism Services: Not on file     Active Member of Clubs or Organizations: Not on file     Attends Club or Organization Meetings: Not on file     Marital Status: Not on file   Interpersonal Safety: Low Risk  (6/7/2024)    Interpersonal Safety     Do you feel physically and emotionally safe where you currently live?: Yes     Within the past 12 months, have you been hit, slapped, kicked or otherwise physically hurt by someone?: No     Within the past 12 months, have you been humiliated or emotionally abused in other ways by your partner or ex-partner?: No   Housing Stability: Low Risk  (4/24/2024)    Housing Stability     Do you have housing? : Yes     Are you worried about losing your housing?: No        FAMILY HISTORY:   Family History   Problem Relation Age of Onset    Respiratory Mother         ASTHMA    Allergies Mother     Depression Mother     Chronic Obstructive Pulmonary Disease Mother     Anxiety Disorder Mother     Mental Illness Mother     Asthma Mother     Heart Disease Father         HEART VALVE REPLACEMENT    Hypertension Father     Diabetes Father     Depression Father     Anxiety Disorder Father     Mental Illness Father     Obesity Father     Respiratory Sister      Allergies Sister     Depression Sister     Respiratory Sister     Allergies Sister     Depression Sister     Respiratory Brother     Allergies Brother     Sleep Apnea Maternal Grandfather     Kidney Disease No family hx of         Vitals: There were no vitals taken for this visit.       Lower Extremity Evaluation:     Dermatologic: Skin is intact to both lower extremities without significant lesions, rash or abrasion.  No paronychia or evidence of soft tissue infection is noted.  The nails are hypertrophic and discolored bilateral feet.     Vascular: DP & PT pulses are intact & regular bilaterally.   Capillary filling and skin temperature is normal to both lower extremities.  There are no varicosities, no edema and no trophic changes noted.      Neurologic:   No evidence of weakness in the lower extremities.  Noted evidence of neuropathy with diminished sensation bilaterally.       Musculoskeletal: Patient is ambulatory without assistive device or brace.  No gross ankle deformity noted.  No foot or ankle joint effusion is noted.  One notes hammertoe contracture of the lesser toes bilaterally.    Labs:      Assessment:      No diagnosis found.        Plan:  I have explained to the patient the underlying condition affecting the feet.  At this point, ***.  The patient was given information on local certified foot care nursing services that can provide routine nail care.    There is low risk of morbidity with the procedure.  There was no overlap in work associated with the evaluation/management and the work associated with the procedure.    I have discussed with the patient the importance of diabetic foot care and daily inspection of the feet.  The patient was instructed to come in anytime if there is any concern about the feet with redness, swelling or drainage is noted.    Divina verbalized agreement with and understanding of the rational for the diagnosis and treatment plan.  All questions were answered to best of my  ability and the patient's satisfaction. The patient was advised to contact the clinic with any questions that may arise after the clinic visit.      Disclaimer: This note consists of symbols derived from keyboarding, dictation and/or voice recognition software. As a result, there may be errors in the script that have gone undetected. Please consider this when interpreting information found in this chart.        ERNA Douglas D.P.M., [email or web address].

## 2024-12-17 ENCOUNTER — VIRTUAL VISIT (OUTPATIENT)
Dept: ENDOCRINOLOGY | Facility: CLINIC | Age: 39
End: 2024-12-17
Payer: COMMERCIAL

## 2024-12-17 DIAGNOSIS — N18.32 TYPE 2 DIABETES MELLITUS WITH STAGE 3B CHRONIC KIDNEY DISEASE, WITH LONG-TERM CURRENT USE OF INSULIN (H): Primary | ICD-10-CM

## 2024-12-17 DIAGNOSIS — Z79.4 TYPE 2 DIABETES MELLITUS WITH STAGE 3B CHRONIC KIDNEY DISEASE, WITH LONG-TERM CURRENT USE OF INSULIN (H): Primary | ICD-10-CM

## 2024-12-17 DIAGNOSIS — E11.22 TYPE 2 DIABETES MELLITUS WITH STAGE 3B CHRONIC KIDNEY DISEASE, WITH LONG-TERM CURRENT USE OF INSULIN (H): Primary | ICD-10-CM

## 2024-12-17 PROCEDURE — 99214 OFFICE O/P EST MOD 30 MIN: CPT | Mod: 95 | Performed by: NURSE PRACTITIONER

## 2024-12-17 RX ORDER — ARIPIPRAZOLE 15 MG/1
TABLET ORAL
COMMUNITY
Start: 2024-12-02

## 2024-12-17 RX ORDER — TOPIRAMATE 50 MG/1
50 TABLET, FILM COATED ORAL 2 TIMES DAILY
COMMUNITY
Start: 2024-05-06

## 2024-12-17 NOTE — LETTER
12/17/2024      Divina Delgadillo  [address]  [place] [address]      Dear Colleague,    Thank you for referring your patient, Divina Delgadillo, to the [facility]. Please see a copy of my visit note below.    [facility]    Diabetes Note 12/17/2024    Divina Delgadillo, 1985, [id number]          Reason for visit      1. Type 2 diabetes mellitus with stage 3b chronic kidney disease, with long-term current use of insulin (H)        HPI     Divina Delgadillo is a very pleasant 39 year old old female who presents for follow up.  SUMMARY:      Divina is seen via video visit in follow-up for DM 2. Her current A1c is 8.1 and up slightly from her previous of 7.8. She is using the Medtronic 780G insulin pump with Guardian 4 CGM, which was downloaded and data was reviewed.     TIR was 66%, Above, 32% and low 1%. Her GMI was 7.1%. She is feeling well. Basal/Bolus ratio of 47/53.  She is getting about 79 units a day.     She reports that she is having some occasional lows. These often come before lunch. She does not regularly eat Breakfast.     Renal function remains stable at the present time. Hepatic function is also within normal range.     Her leg is now fully healed after a year post surgery in January of 2024. Because of this, she has returned to work. 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     Blood glucose data:    Insulin Pump Settings     Basal Rate  TIME Units/HR   0000 - 0000 1.0                         Bolus: Insulin : CHO ratio  Time Units Grams CHO   0000 - 0000 1 9                          Correction  #Units Blood glucose >Target BG   1 45 140           Past Medical History     Patient Active Problem List   Diagnosis     Esophageal reflux     NAFL (nonalcoholic fatty liver)     Essential hypertension     Hyperlipidemia LDL goal <100     Stage 3 chronic kidney disease     Mucinous neoplasm of pancreas     Type 2 diabetes mellitus with stage 3  chronic kidney disease, with long-term current use of insulin (H)     Bipolar disorder (H)     Cervical high risk HPV (human papillomavirus) test positive     IUD (intrauterine device) in place     Morbid obesity (H)     Mild intermittent asthma with acute exacerbation     Nicotine abuse     Chronic leukocytosis     Acquired asplenia     Borderline personality disorder (H)     PTSD (post-traumatic stress disorder)     Long term (current) use of anticoagulants     Orthostatic hypotension     Tobacco abuse     Vitamin D insufficiency     Depressive disorder     Schizoaffective disorder, depressive type (H)     Cardiac murmur     History of DVT of arm  (deep vein thrombosis)     Insomnia, unspecified type     Ulnar neuropathy of both upper extremities     Chiari malformation type I (H)     Deep venous thrombosis of arm (H)     Neutrophilic leukocytosis     Other fracture of left femur, initial encounter for closed fracture (H)     Peroneal tendinitis of left lower extremity     Acute left ankle pain     Avascular necrosis of bone of ankle (H)     Secondary renal hyperparathyroidism (H)        Family History       family history includes Allergies in her brother, mother, sister, and sister; Anxiety Disorder in her father and mother; Asthma in her mother; Chronic Obstructive Pulmonary Disease in her mother; Depression in her father, mother, sister, and sister; Diabetes in her father; Heart Disease in her father; Hypertension in her father; Mental Illness in her father and mother; Obesity in her father; Respiratory in her brother, mother, sister, and sister; Sleep Apnea in her maternal grandfather.    Social History      reports that she has been smoking cigarettes and vaping device. She started smoking about 12 months ago. She has a 0.1 pack-year smoking history. She has been exposed to tobacco smoke. She has never used smokeless tobacco. She reports that she does not drink alcohol and does not use drugs.      Review of  "Systems     Patient has no polyuria or polydipsia, no chest pain, dyspnea or TIA's, no numbness, tingling or pain in extremities  Remainder negative except as noted in HPI.    Vital Signs     There were no vitals taken for this visit.  Wt Readings from Last 3 Encounters:   12/04/24 104.3 kg (230 lb)   11/14/24 104.3 kg (230 lb)   10/30/24 104.7 kg (230 lb 12.8 oz)       Physical Exam     Constitutional:  Well developed, Well nourished  HENT:  Normocephalic,   Neck: normal in appearance  Eyes:  PERRL, Conjunctiva pink  Respiratory:  No respiratory distress  Skin: No acanthosis nigricans, lipoatrophy or lipodystrophy  Neurologic:  Alert & oriented x 3, nonfocal  Psychiatric:  Affect, Mood, Insight appropriate        Assessment     1. Type 2 diabetes mellitus with stage 3b chronic kidney disease, with long-term current use of insulin (H)        Plan       DM management remains stable at the present time. No setting changes are warranted. I did recommend that she try and get some protein in in lieu of Breakfast -- couple of sips from a Protein drink or cheese stick and that this should help prevent the lows.    Follow-up with me in 3 months.     Shital Roberts NP  [facility]  12/17/2024  9:40 AM    Lab Results     No results found for: \"HGBA1C\", \"CREATININE\", \"MICROALBUR\"    Cholesterol   Date Value Ref Range Status   07/30/2024 190 <200 mg/dL Final   03/26/2021 147 <200 mg/dL Final     HDL Cholesterol   Date Value Ref Range Status   03/26/2021 57 >49 mg/dL Final     Direct Measure HDL   Date Value Ref Range Status   07/30/2024 45 (L) >=50 mg/dL Final     Triglycerides   Date Value Ref Range Status   07/30/2024 144 <150 mg/dL Final   03/26/2021 90 <150 mg/dL Final     Comment:     Fasting specimen       [unfilled]      Current Medications     Outpatient Medications Prior to Visit   Medication Sig Dispense Refill     ACCU-CHEK GUIDE test strip Use to test blood sugar 3 times daily or as directed. 150 strip 1     " acetaminophen (TYLENOL) 325 MG tablet Take 650 mg by mouth every 4 hours as needed for pain or fever 2 tab every 4-6 hours as needed, do not exceed 9 tabs in 24hours       acetaminophen (TYLENOL) 500 MG tablet Take 1-2 tablets (500-1,000 mg) by mouth every 8 hours as needed for mild pain 30 tablet 0     albuterol (VENTOLIN HFA) 108 (90 Base) MCG/ACT inhaler INHALE 2 PUFFS INTO THE LUNGS EVERY SIX  HOURS AS NEEDED FOR SHORTNESS OF BREATH 18 g 10     alendronate (FOSAMAX) 70 MG tablet Take 1 tablet (70 mg) by mouth every 7 days 12 tablet 0     ARIPiprazole (ABILIFY) 30 MG tablet Take 30 mg by mouth daily       atorvastatin (LIPITOR) 20 MG tablet Take 1 tablet (20 mg) by mouth daily 90 tablet 3     Biotin 5 MG TABS Take 1 tablet by mouth daily 100 tablet 1     blood glucose (ACCU-CHEK GUIDE) test strip Use to test blood sugar 3 times daily or as directed. 100 strip 11     bumetanide (BUMEX) 0.5 MG tablet Take 1 tablet (0.5 mg) by mouth daily 30 tablet 2     carvedilol (COREG) 12.5 MG tablet Take 1 tablet (12.5 mg) by mouth 2 times daily 60 tablet 11     cloZAPine (CLOZARIL) 50 MG tablet Take 150 mg by mouth at bedtime       diclofenac (VOLTAREN) 1 % topical gel Apply 2 g topically 4 times daily 150 g 1     docusate sodium (COLACE) 100 MG capsule Take 1 capsule (100 mg) by mouth 2 times daily as needed for constipation 60 capsule 1     FLUoxetine (PROZAC) 40 MG capsule Take 40 mg by mouth daily       glucose (BD GLUCOSE) 4 g chewable tablet Take 1 tablet by mouth every hour as needed for low blood sugar 30 tablet 4     hydrOXYzine HCl (ATARAX) 25 MG tablet Take 25 mg by mouth 3 times daily as needed for anxiety       insulin aspart (NOVOLOG VIAL) 100 UNITS/ML vial for use with continuous insulin pump  Max TDD 80 150 mL 1     Insulin Infusion Pump (MINIMED 780G INSULIN PUMP) KIT 1 each daily SmartGuard Target: 100; Active Insulin Time: 2 hours (recommended); SmartGuardTM Warmup/Manual Mode: Suspend before low  "(recommended) 1 kit 0     Insulin Infusion Pump Supplies (EXTENDED INFUSION SET 23\"/6MM) MISC 1 each every 7 days Max Total Daily Dose 70 units; Change infusion set & reservoir every 7 days (recommended) 10 each 6     Insulin Infusion Pump Supplies (EXTENDED RESERVOIR 3ML) MISC 1 each every 7 days 10 each 11     lamoTRIgine (LAMICTAL) 100 MG tablet Take 100 mg by mouth At Bedtime       lisinopril (ZESTRIL) 2.5 MG tablet Take 1 tablet (2.5 mg) by mouth daily 90 tablet 1     loratadine (CLARITIN) 10 MG tablet Take 1 tablet (10 mg) by mouth every morning 30 tablet 9     meclizine (ANTIVERT) 25 MG tablet Take 1 tablet (25 mg) by mouth 3 times daily as needed for dizziness 30 tablet 0     omeprazole (PRILOSEC) 20 MG DR capsule TAKE 1 CAPSULE BY MOUTH EVERY DAY 90 capsule 2     oxyCODONE (ROXICODONE) 5 MG tablet Take 1 tablet (5 mg) by mouth 2 times daily as needed for pain 12 tablet 0     Suvorexant (BELSOMRA) 10 MG tablet Take 10 mg by mouth at bedtime       topiramate (TOPAMAX) 25 MG tablet Take 1 tablet (25 mg) by mouth at bedtime 90 tablet 3     nitroGLYcerin 0.2% ointment Apply 1 click (0.25 g) topically every 24 hours for 14 days 4 g 0     No facility-administered medications prior to visit.               Virtual Visit Details    Type of service:  Video Visit   Video Start Time: {video visit start/end time for provider to select:893973}  Video End Time:{video visit start/end time for provider to select:477861}    Originating Location (pt. Location): {video visit patient location:519946::\"Home\"}  {PROVIDER LOCATION On-site should be selected for visits conducted from your clinic location or adjoining [facility] hospital, academic office, or other nearby [facility] building. Off-site should be selected for all other provider locations, including home:[id number]}  Distant Location (provider location):  {virtual location provider:769316}  Platform used for Video Visit: {Virtual Visit Platforms:720803::\"UNX\"}      Again, thank you " for allowing me to participate in the care of your patient.        Sincerely,        Shital Roberts NP

## 2024-12-17 NOTE — PROGRESS NOTES
[facility] ENDOCRINOLOGY    Diabetes Note 12/17/2024    Divina Delgadillo, 1985, [id number]          Reason for visit      1. Type 2 diabetes mellitus with stage 3b chronic kidney disease, with long-term current use of insulin (H)        MECHE     Divina Delgadillo is a very pleasant 39 year old old female who presents for follow up.  SUMMARY:      Divina is seen via video visit in follow-up for DM 2. Her current A1c is 8.1 and up slightly from her previous of 7.8. She is using the Medtronic 780G insulin pump with Guardian 4 CGM, which was downloaded and data was reviewed.     TIR was 66%, Above, 32% and low 1%. Her GMI was 7.1%. She is feeling well. Basal/Bolus ratio of 47/53.  She is getting about 79 units a day.     She reports that she is having some occasional lows. These often come before lunch. She does not regularly eat Breakfast.     Renal function remains stable at the present time. Hepatic function is also within normal range.     Her leg is now fully healed after a year post surgery in January of 2024. Because of this, she has returned to work. 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     Blood glucose data:    Insulin Pump Settings     Basal Rate  TIME Units/HR   0000 - 0000 1.0                         Bolus: Insulin : CHO ratio  Time Units Grams CHO   0000 - 0000 1 9                          Correction  #Units Blood glucose >Target BG   1 45 140           Past Medical History     Patient Active Problem List   Diagnosis    Esophageal reflux    NAFL (nonalcoholic fatty liver)    Essential hypertension    Hyperlipidemia LDL goal <100    Stage 3 chronic kidney disease    Mucinous neoplasm of pancreas    Type 2 diabetes mellitus with stage 3 chronic kidney disease, with long-term current use of insulin (H)    Bipolar disorder (H)    Cervical high risk HPV (human papillomavirus) test positive    IUD (intrauterine device) in place    Morbid obesity (H)    Mild intermittent asthma with acute  exacerbation    Nicotine abuse    Chronic leukocytosis    Acquired asplenia    Borderline personality disorder (H)    PTSD (post-traumatic stress disorder)    Long term (current) use of anticoagulants    Orthostatic hypotension    Tobacco abuse    Vitamin D insufficiency    Depressive disorder    Schizoaffective disorder, depressive type (H)    Cardiac murmur    History of DVT of arm  (deep vein thrombosis)    Insomnia, unspecified type    Ulnar neuropathy of both upper extremities    Chiari malformation type I (H)    Deep venous thrombosis of arm (H)    Neutrophilic leukocytosis    Other fracture of left femur, initial encounter for closed fracture (H)    Peroneal tendinitis of left lower extremity    Acute left ankle pain    Avascular necrosis of bone of ankle (H)    Secondary renal hyperparathyroidism (H)        Family History       family history includes Allergies in her brother, mother, sister, and sister; Anxiety Disorder in her father and mother; Asthma in her mother; Chronic Obstructive Pulmonary Disease in her mother; Depression in her father, mother, sister, and sister; Diabetes in her father; Heart Disease in her father; Hypertension in her father; Mental Illness in her father and mother; Obesity in her father; Respiratory in her brother, mother, sister, and sister; Sleep Apnea in her maternal grandfather.    Social History      reports that she has been smoking cigarettes and vaping device. She started smoking about 12 months ago. She has a 0.1 pack-year smoking history. She has been exposed to tobacco smoke. She has never used smokeless tobacco. She reports that she does not drink alcohol and does not use drugs.      Review of Systems     Patient has no polyuria or polydipsia, no chest pain, dyspnea or TIA's, no numbness, tingling or pain in extremities  Remainder negative except as noted in HPI.    Vital Signs     There were no vitals taken for this visit.  Wt Readings from Last 3 Encounters:  "  12/04/24 104.3 kg (230 lb)   11/14/24 104.3 kg (230 lb)   10/30/24 104.7 kg (230 lb 12.8 oz)       Physical Exam     Constitutional:  Well developed, Well nourished  HENT:  Normocephalic,   Neck: normal in appearance  Eyes:  PERRL, Conjunctiva pink  Respiratory:  No respiratory distress  Skin: No acanthosis nigricans, lipoatrophy or lipodystrophy  Neurologic:  Alert & oriented x 3, nonfocal  Psychiatric:  Affect, Mood, Insight appropriate        Assessment     1. Type 2 diabetes mellitus with stage 3b chronic kidney disease, with long-term current use of insulin (H)        Plan       DM management remains stable at the present time. No setting changes are warranted. I did recommend that she try and get some protein in in lieu of Breakfast -- couple of sips from a Protein drink or cheese stick and that this should help prevent the lows.    Follow-up with me in 3 months.     Shtial Roberts NP  [facility]  12/17/2024  9:40 AM    Lab Results     No results found for: \"HGBA1C\", \"CREATININE\", \"MICROALBUR\"    Cholesterol   Date Value Ref Range Status   07/30/2024 190 <200 mg/dL Final   03/26/2021 147 <200 mg/dL Final     HDL Cholesterol   Date Value Ref Range Status   03/26/2021 57 >49 mg/dL Final     Direct Measure HDL   Date Value Ref Range Status   07/30/2024 45 (L) >=50 mg/dL Final     Triglycerides   Date Value Ref Range Status   07/30/2024 144 <150 mg/dL Final   03/26/2021 90 <150 mg/dL Final     Comment:     Fasting specimen       [unfilled]      Current Medications     Outpatient Medications Prior to Visit   Medication Sig Dispense Refill    ACCU-CHEK GUIDE test strip Use to test blood sugar 3 times daily or as directed. 150 strip 1    acetaminophen (TYLENOL) 325 MG tablet Take 650 mg by mouth every 4 hours as needed for pain or fever 2 tab every 4-6 hours as needed, do not exceed 9 tabs in 24hours      acetaminophen (TYLENOL) 500 MG tablet Take 1-2 tablets (500-1,000 mg) by mouth every 8 hours as needed for " "mild pain 30 tablet 0    albuterol (VENTOLIN HFA) 108 (90 Base) MCG/ACT inhaler INHALE 2 PUFFS INTO THE LUNGS EVERY SIX  HOURS AS NEEDED FOR SHORTNESS OF BREATH 18 g 10    alendronate (FOSAMAX) 70 MG tablet Take 1 tablet (70 mg) by mouth every 7 days 12 tablet 0    ARIPiprazole (ABILIFY) 30 MG tablet Take 30 mg by mouth daily      atorvastatin (LIPITOR) 20 MG tablet Take 1 tablet (20 mg) by mouth daily 90 tablet 3    Biotin 5 MG TABS Take 1 tablet by mouth daily 100 tablet 1    blood glucose (ACCU-CHEK GUIDE) test strip Use to test blood sugar 3 times daily or as directed. 100 strip 11    bumetanide (BUMEX) 0.5 MG tablet Take 1 tablet (0.5 mg) by mouth daily 30 tablet 2    carvedilol (COREG) 12.5 MG tablet Take 1 tablet (12.5 mg) by mouth 2 times daily 60 tablet 11    cloZAPine (CLOZARIL) 50 MG tablet Take 150 mg by mouth at bedtime      diclofenac (VOLTAREN) 1 % topical gel Apply 2 g topically 4 times daily 150 g 1    docusate sodium (COLACE) 100 MG capsule Take 1 capsule (100 mg) by mouth 2 times daily as needed for constipation 60 capsule 1    FLUoxetine (PROZAC) 40 MG capsule Take 40 mg by mouth daily      glucose (BD GLUCOSE) 4 g chewable tablet Take 1 tablet by mouth every hour as needed for low blood sugar 30 tablet 4    hydrOXYzine HCl (ATARAX) 25 MG tablet Take 25 mg by mouth 3 times daily as needed for anxiety      insulin aspart (NOVOLOG VIAL) 100 UNITS/ML vial for use with continuous insulin pump  Max TDD 80 150 mL 1    Insulin Infusion Pump (MINIMED 780G INSULIN PUMP) KIT 1 each daily SmartGuard Target: 100; Active Insulin Time: 2 hours (recommended); SmartGuardTM Warmup/Manual Mode: Suspend before low (recommended) 1 kit 0    Insulin Infusion Pump Supplies (EXTENDED INFUSION SET 23\"/6MM) MISC 1 each every 7 days Max Total Daily Dose 70 units; Change infusion set & reservoir every 7 days (recommended) 10 each 6    Insulin Infusion Pump Supplies (EXTENDED RESERVOIR 3ML) MISC 1 each every 7 days 10 each " 11    lamoTRIgine (LAMICTAL) 100 MG tablet Take 100 mg by mouth At Bedtime      lisinopril (ZESTRIL) 2.5 MG tablet Take 1 tablet (2.5 mg) by mouth daily 90 tablet 1    loratadine (CLARITIN) 10 MG tablet Take 1 tablet (10 mg) by mouth every morning 30 tablet 9    meclizine (ANTIVERT) 25 MG tablet Take 1 tablet (25 mg) by mouth 3 times daily as needed for dizziness 30 tablet 0    omeprazole (PRILOSEC) 20 MG DR capsule TAKE 1 CAPSULE BY MOUTH EVERY DAY 90 capsule 2    oxyCODONE (ROXICODONE) 5 MG tablet Take 1 tablet (5 mg) by mouth 2 times daily as needed for pain 12 tablet 0    Suvorexant (BELSOMRA) 10 MG tablet Take 10 mg by mouth at bedtime      topiramate (TOPAMAX) 25 MG tablet Take 1 tablet (25 mg) by mouth at bedtime 90 tablet 3    nitroGLYcerin 0.2% ointment Apply 1 click (0.25 g) topically every 24 hours for 14 days 4 g 0     No facility-administered medications prior to visit.               Virtual Visit Details    Type of service:  Video Visit   Video Start Time:  0800  Video End Time: 0820    Originating Location (pt. Location): Home    Distant Location (provider location):  On-site  Platform used for Video Visit: Lula

## 2024-12-21 DIAGNOSIS — M80.00XD OSTEOPOROSIS WITH CURRENT PATHOLOGICAL FRACTURE WITH ROUTINE HEALING, UNSPECIFIED OSTEOPOROSIS TYPE, SUBSEQUENT ENCOUNTER: ICD-10-CM

## 2024-12-23 ENCOUNTER — HOSPITAL ENCOUNTER (OUTPATIENT)
Dept: CT IMAGING | Facility: CLINIC | Age: 39
Discharge: HOME | End: 2024-12-23
Attending: STUDENT IN AN ORGANIZED HEALTH CARE EDUCATION/TRAINING PROGRAM | Admitting: STUDENT IN AN ORGANIZED HEALTH CARE EDUCATION/TRAINING PROGRAM
Payer: COMMERCIAL

## 2024-12-23 DIAGNOSIS — R74.8 ELEVATED ALKALINE PHOSPHATASE LEVEL: ICD-10-CM

## 2024-12-23 LAB
CREAT BLD-MCNC: 2.1 MG/DL (ref 0.5–1)
EGFRCR SERPLBLD CKD-EPI 2021: 30 ML/MIN/1.73M2

## 2024-12-23 PROCEDURE — 74170 CT ABD WO CNTRST FLWD CNTRST: CPT

## 2024-12-23 PROCEDURE — 250N000011 HC RX IP 250 OP 636: Performed by: STUDENT IN AN ORGANIZED HEALTH CARE EDUCATION/TRAINING PROGRAM

## 2024-12-23 PROCEDURE — 250N000009 HC RX 250: Performed by: STUDENT IN AN ORGANIZED HEALTH CARE EDUCATION/TRAINING PROGRAM

## 2024-12-23 PROCEDURE — 82565 ASSAY OF CREATININE: CPT

## 2024-12-23 RX ORDER — IOPAMIDOL 755 MG/ML
112 INJECTION, SOLUTION INTRAVASCULAR ONCE
Status: COMPLETED | OUTPATIENT
Start: 2024-12-23 | End: 2024-12-23

## 2024-12-23 RX ORDER — ALENDRONATE SODIUM 70 MG/1
70 TABLET ORAL
Qty: 4 TABLET | Refills: 2 | Status: SHIPPED | OUTPATIENT
Start: 2024-12-23

## 2024-12-23 RX ADMIN — SODIUM CHLORIDE 77 ML: 9 INJECTION, SOLUTION INTRAVENOUS at 14:00

## 2024-12-23 RX ADMIN — IOPAMIDOL 112 ML: 755 INJECTION, SOLUTION INTRAVENOUS at 14:00

## 2024-12-26 DIAGNOSIS — K76.9 LIVER LESION: Primary | ICD-10-CM

## 2024-12-26 NOTE — RESULT ENCOUNTER NOTE
Fito Delgadillo,     Based on the results of your CT abdomen, it does appear that the radiologist wanted a follow-up in 6 months.  I will go ahead and place the order for you, please call [phone] to schedule an appointment for your repeat CT abdomen.       Sincerely,     Chyna Dawson MD

## 2025-01-02 ENCOUNTER — OFFICE VISIT (OUTPATIENT)
Dept: FAMILY MEDICINE | Facility: CLINIC | Age: 40
End: 2025-01-02
Payer: COMMERCIAL

## 2025-01-02 VITALS
HEART RATE: 75 BPM | HEIGHT: 64 IN | DIASTOLIC BLOOD PRESSURE: 52 MMHG | BODY MASS INDEX: 40.41 KG/M2 | RESPIRATION RATE: 16 BRPM | TEMPERATURE: 98.7 F | SYSTOLIC BLOOD PRESSURE: 110 MMHG | WEIGHT: 236.7 LBS | OXYGEN SATURATION: 97 %

## 2025-01-02 DIAGNOSIS — Z71.6 ENCOUNTER FOR SMOKING CESSATION COUNSELING: ICD-10-CM

## 2025-01-02 DIAGNOSIS — K76.0 NAFL (NONALCOHOLIC FATTY LIVER): Chronic | ICD-10-CM

## 2025-01-02 DIAGNOSIS — Z00.00 ANNUAL PHYSICAL EXAM: Primary | ICD-10-CM

## 2025-01-02 DIAGNOSIS — E66.01 MORBID OBESITY (H): ICD-10-CM

## 2025-01-02 LAB
BASOPHILS # BLD AUTO: 0.1 10E3/UL (ref 0–0.2)
BASOPHILS NFR BLD AUTO: 1 %
EOSINOPHIL # BLD AUTO: 0.3 10E3/UL (ref 0–0.7)
EOSINOPHIL NFR BLD AUTO: 2 %
ERYTHROCYTE [DISTWIDTH] IN BLOOD BY AUTOMATED COUNT: 15.2 % (ref 10–15)
HCT VFR BLD AUTO: 35.9 % (ref 35–47)
HGB BLD-MCNC: 11.4 G/DL (ref 11.7–15.7)
IMM GRANULOCYTES # BLD: 0.1 10E3/UL
IMM GRANULOCYTES NFR BLD: 0 %
LYMPHOCYTES # BLD AUTO: 3.6 10E3/UL (ref 0.8–5.3)
LYMPHOCYTES NFR BLD AUTO: 23 %
MCH RBC QN AUTO: 29.6 PG (ref 26.5–33)
MCHC RBC AUTO-ENTMCNC: 31.8 G/DL (ref 31.5–36.5)
MCV RBC AUTO: 93 FL (ref 78–100)
MONOCYTES # BLD AUTO: 0.9 10E3/UL (ref 0–1.3)
MONOCYTES NFR BLD AUTO: 5 %
NEUTROPHILS # BLD AUTO: 10.9 10E3/UL (ref 1.6–8.3)
NEUTROPHILS NFR BLD AUTO: 69 %
PLATELET # BLD AUTO: 371 10E3/UL (ref 150–450)
RBC # BLD AUTO: 3.85 10E6/UL (ref 3.8–5.2)
WBC # BLD AUTO: 15.8 10E3/UL (ref 4–11)

## 2025-01-02 RX ORDER — NICOTINE 21 MG/24HR
1 PATCH, TRANSDERMAL 24 HOURS TRANSDERMAL EVERY 24 HOURS
Qty: 7 PATCH | Refills: 0 | Status: SHIPPED | OUTPATIENT
Start: 2025-01-02

## 2025-01-02 RX ORDER — NICOTINE 21 MG/24HR
1 PATCH, TRANSDERMAL 24 HOURS TRANSDERMAL EVERY 24 HOURS
Qty: 14 PATCH | Refills: 0 | Status: SHIPPED | OUTPATIENT
Start: 2025-01-02

## 2025-01-02 SDOH — HEALTH STABILITY: PHYSICAL HEALTH: ON AVERAGE, HOW MANY MINUTES DO YOU ENGAGE IN EXERCISE AT THIS LEVEL?: 0 MIN

## 2025-01-02 SDOH — HEALTH STABILITY: PHYSICAL HEALTH: ON AVERAGE, HOW MANY DAYS PER WEEK DO YOU ENGAGE IN MODERATE TO STRENUOUS EXERCISE (LIKE A BRISK WALK)?: 1 DAY

## 2025-01-02 ASSESSMENT — ASTHMA QUESTIONNAIRES
QUESTION_2 LAST FOUR WEEKS HOW OFTEN HAVE YOU HAD SHORTNESS OF BREATH: NOT AT ALL
ACT_TOTALSCORE: 25
ACT_TOTALSCORE: 25
QUESTION_4 LAST FOUR WEEKS HOW OFTEN HAVE YOU USED YOUR RESCUE INHALER OR NEBULIZER MEDICATION (SUCH AS ALBUTEROL): NOT AT ALL
QUESTION_3 LAST FOUR WEEKS HOW OFTEN DID YOUR ASTHMA SYMPTOMS (WHEEZING, COUGHING, SHORTNESS OF BREATH, CHEST TIGHTNESS OR PAIN) WAKE YOU UP AT NIGHT OR EARLIER THAN USUAL IN THE MORNING: NOT AT ALL
QUESTION_5 LAST FOUR WEEKS HOW WOULD YOU RATE YOUR ASTHMA CONTROL: COMPLETELY CONTROLLED
QUESTION_1 LAST FOUR WEEKS HOW MUCH OF THE TIME DID YOUR ASTHMA KEEP YOU FROM GETTING AS MUCH DONE AT WORK, SCHOOL OR AT HOME: NONE OF THE TIME

## 2025-01-02 ASSESSMENT — SOCIAL DETERMINANTS OF HEALTH (SDOH): HOW OFTEN DO YOU GET TOGETHER WITH FRIENDS OR RELATIVES?: MORE THAN THREE TIMES A WEEK

## 2025-01-02 NOTE — PATIENT INSTRUCTIONS
Lab Results   Component Value Date    A1C 8.1 10/29/2024    A1C 7.8 07/30/2024    A1C 8.7 05/09/2024    A1C 9.8 03/29/2024    A1C 8.3 09/13/2023    A1C 7.4 07/08/2021    A1C 8.0 03/26/2021    A1C 9.0 08/14/2020    A1C 7.8 05/18/2020    A1C 7.4 02/17/2020

## 2025-01-02 NOTE — PROGRESS NOTES
"Preventive Care Visit  [facility]  VERONIQUE Spears CNP, Family Medicine      Assessment & Plan     Annual physical exam    - CBC with platelets and differential; Future  - CBC with platelets and differential    Morbid obesity (H)  -patient would like to talk to weight loss clinic provider if she can be considered as candidate for bariatric surgery   -she is currently on Topamax and states its not helpful, she also tried Naltrexone and unfortunately she can not take GLP1s due to history of pancreatitis   - REVIEW OF HEALTH MAINTENANCE PROTOCOL ORDERS  - Adult Comprehensive Weight Management  Referral; Future  - CBC with platelets and differential; Future  - CBC with platelets and differential    Encounter for smoking cessation counseling    - nicotine (NICODERM CQ) 21 MG/24HR 24 hr patch; Place 1 patch over 24 hours onto the skin every 24 hours. 21 mg for about 2 weeks, then decrease to 14 mg for about 7 days and then decrease to 7 mg for up to 7 days and stop  - nicotine (NICODERM CQ) 14 MG/24HR 24 hr patch; Place 1 patch over 24 hours onto the skin every 24 hours.  - nicotine (NICODERM CQ) 7 MG/24HR 24 hr patch; Place 1 patch over 24 hours onto the skin every 24 hours.    NAFL (nonalcoholic fatty liver)  -discussed recent CT scan and recent lab results, recommended to repeat CT scan in 6 months and then schedule follow up with her hepatologist     Nicotine/Tobacco Cessation  She reports that she has been smoking cigarettes and vaping device. She started smoking about 13 months ago. She has a 0.1 pack-year smoking history. She has been exposed to tobacco smoke. She has never used smokeless tobacco.  Nicotine/Tobacco Cessation Plan  Pharmacotherapies : Nicotine patch      BMI  Estimated body mass index is 40.63 kg/m  as calculated from the following:    Height as of this encounter: 1.626 m (5' 4\").    Weight as of this encounter: 107.4 kg (236 lb 11.2 oz).   Weight " management plan: Patient referred to endocrine and/or weight management specialty    Counseling  Appropriate preventive services were addressed with this patient via screening, questionnaire, or discussion as appropriate for fall prevention, nutrition, physical activity, Tobacco-use cessation, social engagement, weight loss and cognition.  Checklist reviewing preventive services available has been given to the patient.  Reviewed patient's diet, addressing concerns and/or questions.   She is at risk for lack of exercise and has been provided with information to increase physical activity for the benefit of her well-being.   The patient was instructed to see the dentist every 6 months.   She is at risk for psychosocial distress and has been provided with information to reduce risk.   Discussed possible causes of fatigue. Updated plan of care.  Patient reported difficulty with activities of daily living were addressed today.The patient was provided with written information regarding signs of hearing loss.           Rhiannon Murcia is a 39 year old, presenting for the following:  Physical (Wants to discuss bariatric surgery and quitting smoking )        10/30/2024     2:01 PM   Additional Questions   Roomed by Jaimee MCGREGOR    Health Care Directive  Patient has a Health Care Directive on file  Advance care planning document is on file and is current.      1/2/2025   General Health   How would you rate your overall physical health? (!) FAIR   Feel stress (tense, anxious, or unable to sleep) To some extent         1/2/2025   Nutrition   Diet: Low salt         1/2/2025   Exercise   Days per week of moderate/strenous exercise 1 day   Average minutes spent exercising at this level 0 min         1/2/2025   Social Factors   Frequency of gathering with friends or relatives More than three times a week   Worry food won't last until get money to buy more No   Food not last or not have enough money for food? No   Do you have  housing? (Housing is defined as stable permanent housing and does not include staying ouside in a car, in a tent, in an abandoned building, in an overnight shelter, or couch-surfing.) Yes   Are you worried about losing your housing? No   Lack of transportation? No   Unable to get utilities (heat,electricity)? No         4/24/2024   Activities of Daily Living- Home Safety   Needs help with the following daily activites Transportation    Shopping    Housework    Laundry    Medication administration    Money management   Safety concerns in the home None of the above       Multiple values from one day are sorted in reverse-chronological order         1/2/2025   Dental   Dentist two times every year? (!) NO         4/24/2024   Hearing Screening   Hearing concerns? (!) I NEED TO ASK PEOPLE TO SPEAK UP OR REPEAT THEMSELVES.    (!) TROUBLE UNDERSTANDING SOFT OR WHISPERED SPEECH.       Multiple values from one day are sorted in reverse-chronological order           4/24/2024   Urinary Incontinence Screening   Bothered by leaking urine in past 6 months No         4/24/2024   TB Screening   Were you born outside of the [place]? No           Today's PHQ-2 Score:       1/2/2025    12:42 PM   PHQ-2 ( 1999 Pf[employer])   Q1: Little interest or pleasure in doing things 0   Q2: Feeling down, depressed or hopeless 0   PHQ-2 Score 0    Q1: Little interest or pleasure in doing things Not at all   Q2: Feeling down, depressed or hopeless Not at all   PHQ-2 Score 0       Patient-reported         1/2/2025   Substance Use   Alcohol more than 3/day or more than 7/wk Not Applicable   Do you have a current opioid prescription? No   How severe/bad is pain from 1 to 10? 3/10   Do you use any other substances recreationally? No     Social History     Tobacco Use    Smoking status: Every Day     Current packs/day: 0.00     Average packs/day: 0.5 packs/day for 0.2 years (0.1 ttl pk-yrs)     Types: Cigarettes, Vaping Device     Start date: 11/27/2023     Last  attempt to quit: 2024     Years since quittin.8     Passive exposure: Yes    Smokeless tobacco: Never    Tobacco comments:     E- cig   Vaping Use    Vaping status: Every Day    Substances: Nicotine, Flavoring    Devices: Disposable   Substance Use Topics    Alcohol use: No     Comment: sober since     Drug use: No         Latest Ref Rng & Units 2024    11:09 AM 3/26/2021     9:18 AM 3/26/2021     9:00 AM   PAP / HPV   PAP  Negative for Intraepithelial Lesion or Malignancy (NILM)      PAP (Historical)   NIL     HPV 16 DNA Negative Negative   Negative    HPV 18 DNA Negative Negative   Negative    Other HR HPV Negative Positive   Negative            2025   Contraception/Family Planning   Questions about contraception or family planning No     Reviewed and updated as needed this visit by Provider     Meds  Problems                 Current providers sharing in care for this patient include:  Patient Care Team:  Jaleesa Velasquez APRN CNP as PCP - General (Nurse Practitioner - Gerontology)  Laic Kruse as Therapist  Patrica Vargas as Psychiatrist  [facility] as   Lucia Caicedo as [facility] worker  Dolly Riley RD as Diabetes Educator (Diabetes Education)  Jaleesa Velasquez APRN CNP as Assigned PCP  Sonja Bueno PA-C as Physician Assistant (Physician Assistant)  Sánchez Dias DO as Assigned Nephrology Provider  Dolly Riley RD as Diabetes Educator (Dietitian, Registered)  Emanuel Mari MD as MD (Endocrinology, Diabetes, and Metabolism)  Leventhal, Thomas Michael, MD as Assigned Gastroenterology Provider  Christophe Beckman MD as Physician (Endocrinology, Diabetes, and Metabolism)  Shelli Watts PA-C as Physician Assistant  Francoise Lehman NP as Nurse Practitioner (Neurological Surgery)  Christophe Beckman MD as Assigned Endocrinology Provider  Lyn Herman MD as MD (Cardiovascular Disease)  Leana Hollis [facility] as  Pharmacist (Pharmacist)  Leana Hollis [facility] as Assigned MTM Pharmacist  Oskar Lorenzo MD as MD (Hematology & Oncology)  Lyn Herman MD as Assigned Heart and Vascular Provider  Jimmie Nino MD as Assigned Musculoskeletal Provider  No Ref-Primary, Physician  Wong Wheat DO as Assigned Neuroscience Provider  Tommy Gomez AuD as Audiologist (Audiology)  Jose Renteria MD as Physician (Otolaryngology)  Jose Renteria MD as Assigned Surgical Provider    The following health maintenance items are reviewed in Epic and correct as of today:  Health Maintenance   Topic Date Due    DIABETIC FOOT EXAM  10/24/2020    ASTHMA ACTION PLAN  08/02/2024    NICOTINE/TOBACCO CESSATION COUNSELING Q 1 YR  12/12/2024    DTAP/TDAP/TD IMMUNIZATION (3 - Td or Tdap) 01/26/2025    COVID-19 Vaccine (8 - 2024-25 season) 02/27/2025    MEDICARE ANNUAL WELLNESS VISIT  04/24/2025    PAP FOLLOW-UP  04/24/2025    HPV FOLLOW-UP  04/24/2025    A1C  04/29/2025    ASTHMA CONTROL TEST  07/02/2025    LIPID  07/30/2025    MICROALBUMIN  07/30/2025    EYE EXAM  08/07/2025    BMP  12/11/2025    HEMOGLOBIN  12/11/2025    ANNUAL REVIEW OF HM ORDERS  01/02/2026    MENINGITIS IMMUNIZATION (3 - Risk 2-dose series) 07/06/2027    ADVANCE CARE PLANNING  04/24/2029    RSV VACCINE (1 - 1-dose 75+ series) 03/21/2061    HEPATITIS C SCREENING  Completed    HIV SCREENING  Completed    PHQ-2 (once per calendar year)  Completed    INFLUENZA VACCINE  Completed    Pneumococcal Vaccine: Pediatrics (0 to 5 Years) and At-Risk Patients (6 to 49 Years)  Completed    URINALYSIS  Completed    HEPATITIS B IMMUNIZATION  Completed    HPV IMMUNIZATION  Aged Out    RSV MONOCLONAL ANTIBODY  Aged Out    PAP  Discontinued         Review of Systems  Constitutional, HEENT, cardiovascular, pulmonary, gi and gu systems are negative, except as otherwise noted.     Objective    Exam  /52   Pulse 75   Temp 98.7  F (37.1  C) (Tympanic)   Resp 16   Ht 1.626  "m (5' 4\")   Wt 107.4 kg (236 lb 11.2 oz)   SpO2 97%   BMI 40.63 kg/m     Estimated body mass index is 40.63 kg/m  as calculated from the following:    Height as of this encounter: 1.626 m (5' 4\").    Weight as of this encounter: 107.4 kg (236 lb 11.2 oz).    Physical Exam  GENERAL: alert and no distress  EYES: Eyes grossly normal to inspection, PERRL and conjunctivae and sclerae normal  HENT: ear canals and TM's normal, nose and mouth without ulcers or lesions  RESP: lungs clear to auscultation - no rales, rhonchi or wheezes  CV: regular rates and rhythm, normal S1 S2, no S3 or S4, grade mild systolic murmur, unchanged compare to prior examinations, peripheral pulses strong, and 1+ bilateral lower extremity pitting edema   MS: no gross musculoskeletal defects noted, no edema  SKIN: no suspicious lesions or rashes  NEURO: Normal strength and tone, mentation intact and speech normal  PSYCH: mentation appears normal, affect normal/bright      1/2/2025   Mini Cog   Mini-Cog Not Completed (choose reason) Patient declines    Patient declines       Multiple values from one day are sorted in reverse-chronological order         Signed Electronically by: VERONIQUE Spears CNP    "

## 2025-01-10 ENCOUNTER — APPOINTMENT (OUTPATIENT)
Dept: GENERAL RADIOLOGY | Facility: CLINIC | Age: 40
End: 2025-01-10
Payer: COMMERCIAL

## 2025-01-10 ENCOUNTER — HOSPITAL ENCOUNTER (EMERGENCY)
Facility: CLINIC | Age: 40
Discharge: HOME | End: 2025-01-10
Payer: COMMERCIAL

## 2025-01-10 VITALS
OXYGEN SATURATION: 97 % | DIASTOLIC BLOOD PRESSURE: 44 MMHG | SYSTOLIC BLOOD PRESSURE: 139 MMHG | HEART RATE: 75 BPM | RESPIRATION RATE: 16 BRPM | TEMPERATURE: 98.6 F

## 2025-01-10 DIAGNOSIS — M79.672 LEFT FOOT PAIN: ICD-10-CM

## 2025-01-10 PROCEDURE — G0463 HOSPITAL OUTPT CLINIC VISIT: HCPCS | Mod: 25

## 2025-01-10 PROCEDURE — 29515 APPLICATION SHORT LEG SPLINT: CPT | Mod: LT

## 2025-01-10 PROCEDURE — 99213 OFFICE O/P EST LOW 20 MIN: CPT | Mod: 25

## 2025-01-10 PROCEDURE — 73610 X-RAY EXAM OF ANKLE: CPT | Mod: LT

## 2025-01-10 PROCEDURE — 73630 X-RAY EXAM OF FOOT: CPT | Mod: LT

## 2025-01-10 RX ORDER — ACETAMINOPHEN 500 MG
500-1000 TABLET ORAL EVERY 6 HOURS PRN
Qty: 40 TABLET | Refills: 0 | Status: SHIPPED | OUTPATIENT
Start: 2025-01-10

## 2025-01-10 ASSESSMENT — ACTIVITIES OF DAILY LIVING (ADL)
ADLS_ACUITY_SCORE: 42
ADLS_ACUITY_SCORE: 42

## 2025-01-10 NOTE — ED PROVIDER NOTES
History     Chief Complaint   Patient presents with    Foot Pain     LT     HPI  Divina Delgadillo is a 39 year old female who presents urgent care with chief complaint of left foot pain.  Patient reports she developed swelling and pain of the dorsal aspect of her left foot and ankle over the past few days.  Patient denies previous injury to the foot or ankle.  She does have history of femur fracture that occurred 1 year ago.  Patient has chronic left thigh pain since that time.  She also notes intermittent left foot swelling since that time.  Denies fever, chills, skin changes, decrease sensation of the foot.  Pain is worse with weightbearing.  She has been taking Tylenol for the pain.    Allergies:  No Known Allergies    Problem List:    Patient Active Problem List    Diagnosis Date Noted    Secondary renal hyperparathyroidism 10/30/2024     Priority: Medium    Peroneal tendinitis of left lower extremity 06/14/2024     Priority: Medium    Acute left ankle pain 06/14/2024     Priority: Medium    Avascular necrosis of bone of ankle (H) 06/14/2024     Priority: Medium    Other fracture of left femur, initial encounter for closed fracture (H) 01/21/2024     Priority: Medium    Chiari malformation type I (H) 06/12/2023     Priority: Medium    Ulnar neuropathy of both upper extremities 09/20/2022     Priority: Medium    Insomnia, unspecified type 08/30/2021     Priority: Medium    History of DVT of arm  (deep vein thrombosis) 01/23/2021     Priority: Medium     ultrasound 1/6/2017-  venous thrombus in the antecubital fossa region and the left forearm as described      Schizoaffective disorder, depressive type (H) 07/19/2019     Priority: Medium    Acquired asplenia 03/22/2019     Priority: Medium    Chronic leukocytosis 11/27/2018     Priority: Medium     Due to spleen removal      Nicotine abuse 08/13/2018     Priority: Medium    Mild intermittent asthma with acute exacerbation 01/16/2018     Priority: Medium     Morbid obesity (H) 01/09/2018     Priority: Medium    IUD (intrauterine device) in place 07/26/2017     Priority: Medium     Mirena IUD inserted in office with premed 7/26/2017        Cervical high risk HPV (human papillomavirus) test positive 06/20/2017     Priority: Medium     6/15/17 NIL, +HR HPV (not 16/18). Plan cotest in 1 year  6/14/18 NIL pap, neg HPV. Plan cotest in 3 years  3/26/21 NIL pap, Neg HPV. Plan cotest in 3 years.   4/24/24 NIL pap, +HR HPV (not 16/18). 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, splenectomy, left adrenalectomy 02/26/2015;      Type 2 diabetes mellitus with stage 3 chronic kidney disease, with long-term current use of insulin (H) 02/27/2017     Priority: Medium    Bipolar disorder (H) 02/27/2017     Priority: Medium    Orthostatic hypotension 01/10/2017     Priority: Medium    Tobacco abuse 01/10/2017     Priority: Medium    Long term (current) use of anticoagulants 01/09/2017     Priority: Medium    Deep venous thrombosis of arm (H) 01/09/2017     Priority: Medium    Stage 3 chronic kidney disease 12/03/2015     Priority: Medium     Overview:   Updated per 10/1/17 IMO import      Neutrophilic leukocytosis 08/28/2015     Priority: Medium    Vitamin D insufficiency 06/01/2015     Priority: Medium    Cardiac murmur 08/20/2013     Priority: Medium    Depressive disorder 09/15/2012     Priority: Medium    Hyperlipidemia LDL goal <100 10/31/2010     Priority: Medium    Borderline personality disorder (H) 11/06/2009     Priority: Medium    Essential hypertension 06/13/2008     Priority: Medium    NAFL (nonalcoholic fatty liver) 04/11/2006     Priority: Medium     See flowsheet for hepatic panel.   Stopped zocor, was on godon as well had right upper quandrant ultrasound and ct  ? Psych med related vs [facility]       Esophageal reflux 04/14/2005     Priority: Medium     GERD      PTSD (post-traumatic stress disorder) 01/01/2001     Priority: Medium        Past Medical History:    Past Medical History:   Diagnosis Date    Acquired asplenia     Acute pain of left knee 03/22/2019    Acute-on-chronic kidney injury 03/22/2019    ARF (acute renal failure) 03/23/2019    Asthma     Bipolar disorder (H)     Cardiac murmur     Cervical high risk HPV (human papillomavirus) test positive 06/20/2017    Chiari malformation type I (H)     Chronic bilateral low back pain without sciatica     Deep venous thrombosis of arm (H) 01/06/2017    Depressive disorder     DVT (deep venous thrombosis) (H)     DVT of upper extremity (deep vein thrombosis) (H) 2021    Esophageal reflux     Hypertension     Insomnia     Leukocytosis     Mild intermittent asthma     Morbid obesity (H)     Mucinous neoplasm of pancreas     NAFL (nonalcoholic fatty liver)     Neck pain     Nicotine abuse     Other specified types of schizophrenia, unspecified condition     Schizoaffective disorder, depressive type (H)     Stage 3a chronic kidney disease (CKD) (H)     Type 2 diabetes mellitus (H)     Ulnar neuropathy of both upper extremities     Vitamin D insufficiency        Past Surgical History:    Past Surgical History:   Procedure Laterality Date    ADRENALECTOMY Left 2015    BIOPSY      BREAST SURGERY  2013    COLONOSCOPY      ENT SURGERY  10/01/2000    Tympanoplasty    IR LIVER BIOPSY PERCUTANEOUS  11/14/2019    PANCREATECTOMY PARTIAL  2015    PERCUTANEOUS BIOPSY LIVER Right 11/14/2019    Procedure: Percutaneous Liver Biopsy;  Surgeon: Sean Guzman PA-C;  Location: [facility] OR    SPLENECTOMY  2015    TONSILLECTOMY  10/01/2000    Tonsillectomy       Family History:    Family History   Problem Relation Age of Onset    Respiratory Mother         ASTHMA    Allergies Mother     Depression Mother     Chronic Obstructive Pulmonary Disease Mother     Anxiety Disorder  Mother     Mental Illness Mother     Asthma Mother     Heart Disease Father         HEART VALVE REPLACEMENT    Hypertension Father     Diabetes Father     Depression Father     Anxiety Disorder Father     Mental Illness Father     Obesity Father     Respiratory Sister     Allergies Sister     Depression Sister     Respiratory Sister     Allergies Sister     Depression Sister     Respiratory Brother     Allergies Brother     Sleep Apnea Maternal Grandfather     Kidney Disease No family hx of        Social History:  Marital Status:  Single [1]  Social History     Tobacco Use    Smoking status: Every Day     Current packs/day: 0.00     Average packs/day: 0.5 packs/day for 0.2 years (0.1 ttl pk-yrs)     Types: Cigarettes, Vaping Device     Start date: 2023     Last attempt to quit: 2024     Years since quittin.9     Passive exposure: Yes    Smokeless tobacco: Never    Tobacco comments:     E- cig   Vaping Use    Vaping status: Every Day    Substances: Nicotine, Flavoring    Devices: Disposable   Substance Use Topics    Alcohol use: No     Comment: sober since     Drug use: No        Medications:    acetaminophen (TYLENOL) 500 MG tablet  ACCU-CHEK GUIDE test strip  acetaminophen (TYLENOL) 500 MG tablet  albuterol (VENTOLIN HFA) 108 (90 Base) MCG/ACT inhaler  alendronate (FOSAMAX) 70 MG tablet  ARIPiprazole (ABILIFY) 15 MG tablet  ARIPiprazole (ABILIFY) 30 MG tablet  atorvastatin (LIPITOR) 20 MG tablet  Biotin 5 MG TABS  blood glucose (ACCU-CHEK GUIDE) test strip  bumetanide (BUMEX) 0.5 MG tablet  carvedilol (COREG) 12.5 MG tablet  cloZAPine (CLOZARIL) 50 MG tablet  diclofenac (VOLTAREN) 1 % topical gel  docusate sodium (COLACE) 100 MG capsule  FLUoxetine (PROZAC) 40 MG capsule  glucose (BD GLUCOSE) 4 g chewable tablet  hydrOXYzine HCl (ATARAX) 25 MG tablet  insulin aspart (NOVOLOG VIAL) 100 UNITS/ML vial  Insulin Infusion Pump (MINIMED 780G INSULIN PUMP) KIT  Insulin Infusion Pump Supplies (EXTENDED  "INFUSION SET 23\"/6MM) MISC  Insulin Infusion Pump Supplies (EXTENDED RESERVOIR 3ML) MISC  lamoTRIgine (LAMICTAL) 100 MG tablet  lisinopril (ZESTRIL) 2.5 MG tablet  loratadine (CLARITIN) 10 MG tablet  meclizine (ANTIVERT) 25 MG tablet  nicotine (NICODERM CQ) 14 MG/24HR 24 hr patch  nicotine (NICODERM CQ) 21 MG/24HR 24 hr patch  nicotine (NICODERM CQ) 7 MG/24HR 24 hr patch  nitroGLYcerin 0.2% ointment  omeprazole (PRILOSEC) 20 MG DR capsule  Suvorexant (BELSOMRA) 10 MG tablet  topiramate (TOPAMAX) 25 MG tablet  topiramate (TOPAMAX) 50 MG tablet          Review of Systems   All other systems reviewed and are negative.      Physical Exam   BP: 139/44  Pulse: 75  Temp: 98.6  F (37  C)  Resp: 16  SpO2: 97 %      Physical Exam  Vitals and nursing note reviewed.   Constitutional:       General: She is not in acute distress.     Appearance: Normal appearance.   HENT:      Head: Normocephalic.   Cardiovascular:      Rate and Rhythm: Normal rate.      Pulses: Normal pulses.   Musculoskeletal:      Right lower leg: No edema.      Left lower leg: No edema.      Comments: Pitting edema of left foot.  No skin changes.  Tenderness to palpation of dorsal left foot and lateral medial ankle.  Achilles tendon intact.  Discomfort with left ankle extension and flexion.  Sensations intact.   Skin:     General: Skin is warm.   Neurological:      Mental Status: She is alert.   Psychiatric:         Mood and Affect: Mood normal.         Behavior: Behavior normal.         ED Course        Procedures      Results for orders placed or performed during the hospital encounter of 01/10/25 (from the past 24 hours)   Foot XR, G/E 3 views, left    Narrative    EXAM: XR FOOT LEFT G/E 3 VIEWS, XR ANKLE LEFT G/E 3 VIEWS  LOCATION: [facility]  DATE: 1/10/2025    INDICATION: Dorsal foot pain and swelling.  COMPARISON:  05/28/2024.      Impression    IMPRESSION:  Chronic appearing mildly distracted fracture anterior process of " the calcaneus with some cortication along the margins. No cortical destructive change to suggest osteomyelitis of the foot or ankle. Ankle mortise is intact. There is at least   moderate arthrosis of the calcaneocuboid joint.    There is some vague sclerosis and lucency along the talar dome which does raise concern for osteonecrosis, chronic.   XR Ankle Left G/E 3 Views    Narrative    EXAM: XR FOOT LEFT G/E 3 VIEWS, XR ANKLE LEFT G/E 3 VIEWS  LOCATION: [facility]  DATE: 1/10/2025    INDICATION: Dorsal foot pain and swelling.  COMPARISON:  05/28/2024.      Impression    IMPRESSION:  Chronic appearing mildly distracted fracture anterior process of the calcaneus with some cortication along the margins. No cortical destructive change to suggest osteomyelitis of the foot or ankle. Ankle mortise is intact. There is at least   moderate arthrosis of the calcaneocuboid joint.    There is some vague sclerosis and lucency along the talar dome which does raise concern for osteonecrosis, chronic.     *Note: Due to a large number of results and/or encounters for the requested time period, some results have not been displayed. A complete set of results can be found in Results Review.       Medications - No data to display    Assessments & Plan (with Medical Decision Making)     I have reviewed the nursing notes.    I have reviewed the findings, diagnosis, plan and need for follow up with the patient.      Medical Decision Making  39 year old female who presents urgent care with chief complaint of left foot pain.  Patient reports she developed swelling and pain of the dorsal aspect of her left foot and ankle over the past few days.  Patient denies previous injury to the foot or ankle.  She does have history of femur fracture that occurred 1 year ago.  Patient has chronic left thigh pain since that time.  She also notes intermittent left foot swelling since that time.  Denies fever, chills, skin changes,  decrease sensation of the foot.  Pain is worse with weightbearing.  She has been taking Tylenol for the pain.    Exam above.Pitting edema of left foot.  No skin changes.  Tenderness to palpation of dorsal left foot and lateral medial ankle.  Achilles tendon intact.  Discomfort with left ankle extension and flexion.  Sensations intact.      Left ankle and foot x-rays obtained and personally reviewed revealing:Chronic appearing mildly distracted fracture anterior process of the calcaneus with some cortication along the margins. No cortical destructive change to suggest osteomyelitis of the foot or ankle. Ankle mortise is intact. There is at least  moderate arthrosis of the calcaneocuboid joint. There is some vague sclerosis and lucency along the talar dome which does raise concern for osteonecrosis, chronic.    I discussed results with patient.  Results seem chronic in appearance however it is unclear if this is the cause of her pain.  Patient was placed in walking boot today which she tolerated well.  Plan urgent follow-up with orthopedics for further evaluation.  I recommended rest, ice, elevation and Tylenol as needed for pain.      Prior to making a final disposition on this patient the results of patient's tests and other diagnostic studies were discussed with the patient. All questions were answered. Patient expressed understanding of the plan and was amenable to it.     Disclaimer: This note consists of symbols derived from keyboarding, dictation and/or voice recognition software. As a result, there may be errors in the script that have gone undetected. Please consider this when interpreting information found in this chart.      New Prescriptions    ACETAMINOPHEN (TYLENOL) 500 MG TABLET    Take 1-2 tablets (500-1,000 mg) by mouth every 6 hours as needed for mild pain.       Final diagnoses:   Left foot pain       1/10/2025   [facility] EMERGENCY DEPT       Kylie Hart PA-C  01/10/25  [id number]

## 2025-01-10 NOTE — DISCHARGE INSTRUCTIONS
Relative rest, activity only as tolerated by pain  Ice 15-20 minutes 3-4 times daily  Tylenol 500 to 1000 mg every 6 hours as needed for pain  Follow up with orthopedics within the next week.

## 2025-01-12 ENCOUNTER — MYC MEDICAL ADVICE (OUTPATIENT)
Dept: FAMILY MEDICINE | Facility: CLINIC | Age: 40
End: 2025-01-12
Payer: COMMERCIAL

## 2025-01-14 ENCOUNTER — MYC MEDICAL ADVICE (OUTPATIENT)
Dept: FAMILY MEDICINE | Facility: CLINIC | Age: 40
End: 2025-01-14
Payer: COMMERCIAL

## 2025-01-14 DIAGNOSIS — K59.00 CONSTIPATION, UNSPECIFIED CONSTIPATION TYPE: ICD-10-CM

## 2025-01-15 ENCOUNTER — OFFICE VISIT (OUTPATIENT)
Dept: PODIATRY | Facility: CLINIC | Age: 40
End: 2025-01-15
Payer: COMMERCIAL

## 2025-01-15 VITALS
DIASTOLIC BLOOD PRESSURE: 69 MMHG | SYSTOLIC BLOOD PRESSURE: 133 MMHG | HEART RATE: 75 BPM | WEIGHT: 236 LBS | HEIGHT: 63 IN | BODY MASS INDEX: 41.82 KG/M2

## 2025-01-15 DIAGNOSIS — M19.072 OSTEOARTHRITIS OF MIDTARSAL JOINT OF LEFT FOOT: ICD-10-CM

## 2025-01-15 DIAGNOSIS — M79.672 LEFT FOOT PAIN: ICD-10-CM

## 2025-01-15 DIAGNOSIS — M25.572 SINUS TARSI SYNDROME, LEFT: Primary | ICD-10-CM

## 2025-01-15 PROCEDURE — 99213 OFFICE O/P EST LOW 20 MIN: CPT | Performed by: PODIATRIST

## 2025-01-15 RX ORDER — DOCUSATE SODIUM 100 MG/1
100 CAPSULE, LIQUID FILLED ORAL 2 TIMES DAILY PRN
Qty: 60 CAPSULE | Refills: 5 | Status: SHIPPED | OUTPATIENT
Start: 2025-01-15

## 2025-01-15 ASSESSMENT — PAIN SCALES - GENERAL: PAINLEVEL_OUTOF10: EXTREME PAIN (8)

## 2025-01-15 NOTE — LETTER
1/15/2025      Divina Delgadillo  [address]  [place] [address]      Dear Colleague,    Thank you for referring your patient, Divina Delgadillo, to the [facility] [place]. Please see a copy of my visit note below.    PATIENT HISTORY:  Divina Delgadillo is a 39 year old female who presents to clinic in consultation at the request of  Kylie Hart PA-C with a chief complaint of left foot pain.  The patient is seen by themselves.  The patient relates the pain is primarily located around the lateral side of foot into the top of the foot.  Patient describes injury as fell in the kitchen in 2023  The patient relates that the symptoms have been going on for several month(s).   The patient is on long term disability.  Any previous notes and studies that pertain to the patient's condition were reviewed.    Pertinent medical, surgical and family history was reviewed in the Epic chart.    Past Medical History:   Past Medical History:   Diagnosis Date     Acquired asplenia      Acute pain of left knee 03/22/2019     Acute-on-chronic kidney injury 03/22/2019     ARF (acute renal failure) 03/23/2019     Asthma      Bipolar disorder (H)      Cardiac murmur      Cervical high risk HPV (human papillomavirus) test positive 06/20/2017     Chiari malformation type I (H)      Chronic bilateral low back pain without sciatica      Deep venous thrombosis of arm (H) 01/06/2017    ultrasound 1/6/2017-  venous thrombus in the antecubital fossa region and the left forearm as described     Depressive disorder      DVT (deep venous thrombosis) (H)      DVT of upper extremity (deep vein thrombosis) (H) 2021     Esophageal reflux     GERD     Hypertension      Insomnia      Leukocytosis      Mild intermittent asthma      Morbid obesity (H)      Mucinous neoplasm of pancreas      NAFL (nonalcoholic fatty liver)      Neck pain      Nicotine abuse      Other specified types of schizophrenia, unspecified  condition      Schizoaffective disorder, depressive type (H)      Stage 3a chronic kidney disease (CKD) (H)      Type 2 diabetes mellitus (H)      Ulnar neuropathy of both upper extremities      Vitamin D insufficiency        Medications:   Current Outpatient Medications:      ACCU-CHEK GUIDE test strip, Use to test blood sugar 3 times daily or as directed., Disp: 150 strip, Rfl: 1     acetaminophen (TYLENOL) 500 MG tablet, Take 1-2 tablets (500-1,000 mg) by mouth every 6 hours as needed for mild pain., Disp: 40 tablet, Rfl: 0     acetaminophen (TYLENOL) 500 MG tablet, Take 1-2 tablets (500-1,000 mg) by mouth every 8 hours as needed for mild pain, Disp: 30 tablet, Rfl: 0     albuterol (VENTOLIN HFA) 108 (90 Base) MCG/ACT inhaler, INHALE 2 PUFFS INTO THE LUNGS EVERY SIX  HOURS AS NEEDED FOR SHORTNESS OF BREATH, Disp: 18 g, Rfl: 10     alendronate (FOSAMAX) 70 MG tablet, Take 1 tablet (70 mg) by mouth every 7 days, Disp: 4 tablet, Rfl: 2     ARIPiprazole (ABILIFY) 15 MG tablet, Take 1 Tablet (15 mg) by mouth once daily in the evening., Disp: , Rfl:      ARIPiprazole (ABILIFY) 30 MG tablet, Take 30 mg by mouth daily, Disp: , Rfl:      atorvastatin (LIPITOR) 20 MG tablet, Take 1 tablet (20 mg) by mouth daily, Disp: 90 tablet, Rfl: 3     Biotin 5 MG TABS, Take 1 tablet by mouth daily, Disp: 100 tablet, Rfl: 1     blood glucose (ACCU-CHEK GUIDE) test strip, Use to test blood sugar 3 times daily or as directed., Disp: 100 strip, Rfl: 11     bumetanide (BUMEX) 0.5 MG tablet, Take 1 tablet (0.5 mg) by mouth daily, Disp: 30 tablet, Rfl: 2     carvedilol (COREG) 12.5 MG tablet, Take 1 tablet (12.5 mg) by mouth 2 times daily, Disp: 60 tablet, Rfl: 11     cloZAPine (CLOZARIL) 50 MG tablet, Take 150 mg by mouth at bedtime, Disp: , Rfl:      diclofenac (VOLTAREN) 1 % topical gel, Apply 2 g topically 4 times daily, Disp: 150 g, Rfl: 1     docusate sodium (COLACE) 100 MG capsule, Take 1 capsule (100 mg) by mouth 2 times daily as  "needed for constipation, Disp: 60 capsule, Rfl: 1     FLUoxetine (PROZAC) 40 MG capsule, Take 40 mg by mouth daily, Disp: , Rfl:      glucose (BD GLUCOSE) 4 g chewable tablet, Take 1 tablet by mouth every hour as needed for low blood sugar, Disp: 30 tablet, Rfl: 4     hydrOXYzine HCl (ATARAX) 25 MG tablet, Take 25 mg by mouth 3 times daily as needed for anxiety, Disp: , Rfl:      insulin aspart (NOVOLOG VIAL) 100 UNITS/ML vial, for use with continuous insulin pump  Max TDD 80, Disp: 150 mL, Rfl: 1     Insulin Infusion Pump (MINIMED 780G INSULIN PUMP) KIT, 1 each daily SmartGuard Target: 100; Active Insulin Time: 2 hours (recommended); SmartGuardTM Warmup/Manual Mode: Suspend before low (recommended), Disp: 1 kit, Rfl: 0     Insulin Infusion Pump Supplies (EXTENDED INFUSION SET 23\"/6MM) MISC, 1 each every 7 days Max Total Daily Dose 70 units; Change infusion set & reservoir every 7 days (recommended), Disp: 10 each, Rfl: 6     Insulin Infusion Pump Supplies (EXTENDED RESERVOIR 3ML) MISC, 1 each every 7 days, Disp: 10 each, Rfl: 11     lamoTRIgine (LAMICTAL) 100 MG tablet, Take 100 mg by mouth At Bedtime, Disp: , Rfl:      lisinopril (ZESTRIL) 2.5 MG tablet, Take 1 tablet (2.5 mg) by mouth daily, Disp: 90 tablet, Rfl: 1     loratadine (CLARITIN) 10 MG tablet, Take 1 tablet (10 mg) by mouth every morning, Disp: 30 tablet, Rfl: 9     meclizine (ANTIVERT) 25 MG tablet, Take 1 tablet (25 mg) by mouth 3 times daily as needed for dizziness, Disp: 30 tablet, Rfl: 0     nicotine (NICODERM CQ) 14 MG/24HR 24 hr patch, Place 1 patch over 24 hours onto the skin every 24 hours., Disp: 7 patch, Rfl: 0     nicotine (NICODERM CQ) 21 MG/24HR 24 hr patch, Place 1 patch over 24 hours onto the skin every 24 hours. 21 mg for about 2 weeks, then decrease to 14 mg for about 7 days and then decrease to 7 mg for up to 7 days and stop, Disp: 14 patch, Rfl: 0     nicotine (NICODERM CQ) 7 MG/24HR 24 hr patch, Place 1 patch over 24 hours onto the " "skin every 24 hours., Disp: 7 patch, Rfl: 0     nitroGLYcerin 0.2% ointment, Apply 1 click (0.25 g) topically every 24 hours for 14 days, Disp: 4 g, Rfl: 0     omeprazole (PRILOSEC) 20 MG DR capsule, TAKE 1 CAPSULE BY MOUTH EVERY DAY, Disp: 90 capsule, Rfl: 2     Suvorexant (BELSOMRA) 10 MG tablet, Take 10 mg by mouth at bedtime, Disp: , Rfl:      topiramate (TOPAMAX) 25 MG tablet, Take 1 tablet (25 mg) by mouth at bedtime, Disp: 90 tablet, Rfl: 3     topiramate (TOPAMAX) 50 MG tablet, Take 50 mg by mouth 2 times daily., Disp: , Rfl:      Allergies:  No Known Allergies    Vitals: /69   Pulse 75   Ht 1.6 m (5' 3\")   Wt 107 kg (236 lb)   BMI 41.81 kg/m    BMI= Body mass index is 41.81 kg/m .    LOWER EXTREMITY PHYSICAL EXAM    Dermatologic: Skin is intact to left lower extremity without significant lesions, rash or abrasion.        Vascular: DP & PT pulses are intact & regular on the left.   CFT and skin temperature is normal to the left lower extremity.     Neurologic: Lower extremity sensation is intact to light touch.  No evidence of weakness in the left lower extremity.        Musculoskeletal: Patient is ambulatory without assistive device or brace.  No gross ankle deformity noted.  No foot or ankle joint effusion is noted.  Noted pain on palpation over the sinus tarsi on the left.  Mild to moderate edema noted.  No ecchymosis noted.    Diagnostics:  Radiographs included three views of the left foot and ankle demonstrating an old avulsion fracture off the anterior process of the calcaneus.  Noted degenerative changes to the calcaneocuboid joint.  No cortical erosions or periosteal elevation.  All joint margins appear stable.  There is no apparent fracture or tumor formation noted.  There is no evidence of foreign body.  The images were independently reviewed by myself along with the patient explaining the findings.      ASSESSMENT / PLAN:     ICD-10-CM    1. Sinus tarsi syndrome, left  M25.572 Orthopedic "  Referral      2. Left foot pain  M79.672 Orthopedic  Referral      3. 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  We discussed the underlying contributing factors to the condition as well as both conservative and surgical treatment options along with expected length of recovery.  At this time, the patient will continue wearing the cam boot.    The patient was instructed to perform warm soaks with Epson salt after which to also apply over-the-counter Voltaren gel to deeply massage the injured tissue.  The patient was instructed to do this on a daily basis until symptoms resolve.   The patient was referred to [facility] for an ultrasound guided steroid injection of the sinus tarsi and calcaneocuboid joint on the left foot.  The patient may return in four weeks for reevaluation to determine if any further treatment will be needed.      Divina verbalized agreement with and understanding of the rational for the diagnosis and treatment plan.  All questions were answered to best of my ability and the patient's satisfaction. The patient was advised to contact the clinic with any questions that may arise after the clinic visit.      Disclaimer: This note consists of symbols derived from keyboarding, dictation and/or voice recognition software. As a result, there may be errors in the script that have gone undetected. Please consider this when interpreting information found in this chart.       JOYCE Hinson.P.M., F.A.C.F.A.S.      Again, thank you for allowing me to participate in the care of your patient.        Sincerely,        Raul Douglas DPM    Electronically signed

## 2025-01-15 NOTE — PROGRESS NOTES
PATIENT HISTORY:  Divina Delgadillo is a 39 year old female who presents to clinic in consultation at the request of  Kylie Hart PA-C with a chief complaint of left foot pain.  The patient is seen by themselves.  The patient relates the pain is primarily located around the lateral side of foot into the top of the foot.  Patient describes injury as fell in the kitchen in 2023  The patient relates that the symptoms have been going on for several month(s).   The patient is on long term disability.  Any previous notes and studies that pertain to the patient's condition were reviewed.    Pertinent medical, surgical and family history was reviewed in the [facility] chart.    Past Medical History:   Past Medical History:   Diagnosis Date    Acquired asplenia     Acute pain of left knee 03/22/2019    Acute-on-chronic kidney injury 03/22/2019    ARF (acute renal failure) 03/23/2019    Asthma     Bipolar disorder (H)     Cardiac murmur     Cervical high risk HPV (human papillomavirus) test positive 06/20/2017    Chiari malformation type I (H)     Chronic bilateral low back pain without sciatica     Deep venous thrombosis of arm (H) 01/06/2017    ultrasound 1/6/2017-  venous thrombus in the antecubital fossa region and the left forearm as described    Depressive disorder     DVT (deep venous thrombosis) (H)     DVT of upper extremity (deep vein thrombosis) (H) 2021    Esophageal reflux     GERD    Hypertension     Insomnia     Leukocytosis     Mild intermittent asthma     Morbid obesity (H)     Mucinous neoplasm of pancreas     NAFL (nonalcoholic fatty liver)     Neck pain     Nicotine abuse     Other specified types of schizophrenia, unspecified condition     Schizoaffective disorder, depressive type (H)     Stage 3a chronic kidney disease (CKD) (H)     Type 2 diabetes mellitus (H)     Ulnar neuropathy of both upper extremities     Vitamin D insufficiency        Medications:   Current Outpatient Medications:     ACCU-CHEK  GUIDE test strip, Use to test blood sugar 3 times daily or as directed., Disp: 150 strip, Rfl: 1    acetaminophen (TYLENOL) 500 MG tablet, Take 1-2 tablets (500-1,000 mg) by mouth every 6 hours as needed for mild pain., Disp: 40 tablet, Rfl: 0    acetaminophen (TYLENOL) 500 MG tablet, Take 1-2 tablets (500-1,000 mg) by mouth every 8 hours as needed for mild pain, Disp: 30 tablet, Rfl: 0    albuterol (VENTOLIN HFA) 108 (90 Base) MCG/ACT inhaler, INHALE 2 PUFFS INTO THE LUNGS EVERY SIX  HOURS AS NEEDED FOR SHORTNESS OF BREATH, Disp: 18 g, Rfl: 10    alendronate (FOSAMAX) 70 MG tablet, Take 1 tablet (70 mg) by mouth every 7 days, Disp: 4 tablet, Rfl: 2    ARIPiprazole (ABILIFY) 15 MG tablet, Take 1 Tablet (15 mg) by mouth once daily in the evening., Disp: , Rfl:     ARIPiprazole (ABILIFY) 30 MG tablet, Take 30 mg by mouth daily, Disp: , Rfl:     atorvastatin (LIPITOR) 20 MG tablet, Take 1 tablet (20 mg) by mouth daily, Disp: 90 tablet, Rfl: 3    Biotin 5 MG TABS, Take 1 tablet by mouth daily, Disp: 100 tablet, Rfl: 1    blood glucose (ACCU-CHEK GUIDE) test strip, Use to test blood sugar 3 times daily or as directed., Disp: 100 strip, Rfl: 11    bumetanide (BUMEX) 0.5 MG tablet, Take 1 tablet (0.5 mg) by mouth daily, Disp: 30 tablet, Rfl: 2    carvedilol (COREG) 12.5 MG tablet, Take 1 tablet (12.5 mg) by mouth 2 times daily, Disp: 60 tablet, Rfl: 11    cloZAPine (CLOZARIL) 50 MG tablet, Take 150 mg by mouth at bedtime, Disp: , Rfl:     diclofenac (VOLTAREN) 1 % topical gel, Apply 2 g topically 4 times daily, Disp: 150 g, Rfl: 1    docusate sodium (COLACE) 100 MG capsule, Take 1 capsule (100 mg) by mouth 2 times daily as needed for constipation, Disp: 60 capsule, Rfl: 1    FLUoxetine (PROZAC) 40 MG capsule, Take 40 mg by mouth daily, Disp: , Rfl:     glucose (BD GLUCOSE) 4 g chewable tablet, Take 1 tablet by mouth every hour as needed for low blood sugar, Disp: 30 tablet, Rfl: 4    hydrOXYzine HCl (ATARAX) 25 MG tablet,  "Take 25 mg by mouth 3 times daily as needed for anxiety, Disp: , Rfl:     insulin aspart (NOVOLOG VIAL) 100 UNITS/ML vial, for use with continuous insulin pump  Max TDD 80, Disp: 150 mL, Rfl: 1    Insulin Infusion Pump (MINIMED 780G INSULIN PUMP) KIT, 1 each daily SmartGuard Target: 100; Active Insulin Time: 2 hours (recommended); SmartGuardTM Warmup/Manual Mode: Suspend before low (recommended), Disp: 1 kit, Rfl: 0    Insulin Infusion Pump Supplies (EXTENDED INFUSION SET 23\"/6MM) MISC, 1 each every 7 days Max Total Daily Dose 70 units; Change infusion set & reservoir every 7 days (recommended), Disp: 10 each, Rfl: 6    Insulin Infusion Pump Supplies (EXTENDED RESERVOIR 3ML) MISC, 1 each every 7 days, Disp: 10 each, Rfl: 11    lamoTRIgine (LAMICTAL) 100 MG tablet, Take 100 mg by mouth At Bedtime, Disp: , Rfl:     lisinopril (ZESTRIL) 2.5 MG tablet, Take 1 tablet (2.5 mg) by mouth daily, Disp: 90 tablet, Rfl: 1    loratadine (CLARITIN) 10 MG tablet, Take 1 tablet (10 mg) by mouth every morning, Disp: 30 tablet, Rfl: 9    meclizine (ANTIVERT) 25 MG tablet, Take 1 tablet (25 mg) by mouth 3 times daily as needed for dizziness, Disp: 30 tablet, Rfl: 0    nicotine (NICODERM CQ) 14 MG/24HR 24 hr patch, Place 1 patch over 24 hours onto the skin every 24 hours., Disp: 7 patch, Rfl: 0    nicotine (NICODERM CQ) 21 MG/24HR 24 hr patch, Place 1 patch over 24 hours onto the skin every 24 hours. 21 mg for about 2 weeks, then decrease to 14 mg for about 7 days and then decrease to 7 mg for up to 7 days and stop, Disp: 14 patch, Rfl: 0    nicotine (NICODERM CQ) 7 MG/24HR 24 hr patch, Place 1 patch over 24 hours onto the skin every 24 hours., Disp: 7 patch, Rfl: 0    nitroGLYcerin 0.2% ointment, Apply 1 click (0.25 g) topically every 24 hours for 14 days, Disp: 4 g, Rfl: 0    omeprazole (PRILOSEC) 20 MG DR capsule, TAKE 1 CAPSULE BY MOUTH EVERY DAY, Disp: 90 capsule, Rfl: 2    Suvorexant (BELSOMRA) 10 MG tablet, Take 10 mg by mouth " "at bedtime, Disp: , Rfl:     topiramate (TOPAMAX) 25 MG tablet, Take 1 tablet (25 mg) by mouth at bedtime, Disp: 90 tablet, Rfl: 3    topiramate (TOPAMAX) 50 MG tablet, Take 50 mg by mouth 2 times daily., Disp: , Rfl:      Allergies:  No Known Allergies    Vitals: /69   Pulse 75   Ht 1.6 m (5' 3\")   Wt 107 kg (236 lb)   BMI 41.81 kg/m    BMI= Body mass index is 41.81 kg/m .    LOWER EXTREMITY PHYSICAL EXAM    Dermatologic: Skin is intact to left lower extremity without significant lesions, rash or abrasion.        Vascular: DP & PT pulses are intact & regular on the left.   CFT and skin temperature is normal to the left lower extremity.     Neurologic: Lower extremity sensation is intact to light touch.  No evidence of weakness in the left lower extremity.        Musculoskeletal: Patient is ambulatory without assistive device or brace.  No gross ankle deformity noted.  No foot or ankle joint effusion is noted.  Noted pain on palpation over the sinus tarsi on the left.  Mild to moderate edema noted.  No ecchymosis noted.    Diagnostics:  Radiographs included three views of the left foot and ankle demonstrating an old avulsion fracture off the anterior process of the calcaneus.  Noted degenerative changes to the calcaneocuboid joint.  No cortical erosions or periosteal elevation.  All joint margins appear stable.  There is no apparent fracture or tumor formation noted.  There is no evidence of foreign body.  The images were independently reviewed by myself along with the patient explaining the findings.      ASSESSMENT / PLAN:     ICD-10-CM    1. Sinus tarsi syndrome, left  M25.572 Orthopedic  Referral      2. Left foot pain  M79.672 Orthopedic  Referral      3. 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  We discussed the underlying contributing factors to the condition as well as both " conservative and surgical treatment options along with expected length of recovery.  At this time, the patient will continue wearing the cam boot.    The patient was instructed to perform warm soaks with Epson salt after which to also apply over-the-counter Voltaren gel to deeply massage the injured tissue.  The patient was instructed to do this on a daily basis until symptoms resolve.   The patient was referred to [facility] for an ultrasound guided steroid injection of the sinus tarsi and calcaneocuboid joint on the left foot.  The patient may return in four weeks for reevaluation to determine if any further treatment will be needed.      Divina verbalized agreement with and understanding of the rational for the diagnosis and treatment plan.  All questions were answered to best of my ability and the patient's satisfaction. The patient was advised to contact the clinic with any questions that may arise after the clinic visit.      Disclaimer: This note consists of symbols derived from keyboarding, dictation and/or voice recognition software. As a result, there may be errors in the script that have gone undetected. Please consider this when interpreting information found in this chart.       ERNA Douglas D.P.M., [email or web address].

## 2025-01-15 NOTE — PATIENT INSTRUCTIONS
Next Steps:    Continue wearing the CAM boot  Get the steroid injection  Return in one month for reevaluation.

## 2025-01-15 NOTE — NURSING NOTE
"Chief Complaint   Patient presents with    Consult     Left foot pain- Xray at urgent care       Initial /69   Pulse 75   Ht 1.6 m (5' 3\")   Wt 107 kg (236 lb)   BMI 41.81 kg/m   Estimated body mass index is 41.81 kg/m  as calculated from the following:    Height as of this encounter: 1.6 m (5' 3\").    Weight as of this encounter: 107 kg (236 lb).  Medications and allergies reviewed.      Pamela COOK MA    "

## 2025-01-20 DIAGNOSIS — J30.2 CHRONIC SEASONAL ALLERGIC RHINITIS: ICD-10-CM

## 2025-01-20 RX ORDER — LORATADINE 10 MG/1
1 TABLET ORAL EVERY MORNING
Qty: 30 TABLET | Refills: 11 | Status: SHIPPED | OUTPATIENT
Start: 2025-01-20

## 2025-01-21 ENCOUNTER — OFFICE VISIT (OUTPATIENT)
Dept: ORTHOPEDICS | Facility: CLINIC | Age: 40
End: 2025-01-21
Payer: COMMERCIAL

## 2025-01-21 DIAGNOSIS — M25.572 SINUS TARSI SYNDROME, LEFT: ICD-10-CM

## 2025-01-21 DIAGNOSIS — M19.072 OSTEOARTHRITIS OF MIDTARSAL JOINT OF LEFT FOOT: ICD-10-CM

## 2025-01-21 PROCEDURE — 99207 PR DROP WITH A PROCEDURE: CPT | Performed by: STUDENT IN AN ORGANIZED HEALTH CARE EDUCATION/TRAINING PROGRAM

## 2025-01-21 PROCEDURE — 20604 DRAIN/INJ JOINT/BURSA W/US: CPT | Mod: LT | Performed by: STUDENT IN AN ORGANIZED HEALTH CARE EDUCATION/TRAINING PROGRAM

## 2025-01-21 RX ORDER — ROPIVACAINE HYDROCHLORIDE 5 MG/ML
2 INJECTION, SOLUTION EPIDURAL; INFILTRATION; PERINEURAL
Status: COMPLETED | OUTPATIENT
Start: 2025-01-21 | End: 2025-01-21

## 2025-01-21 RX ORDER — BETAMETHASONE SODIUM PHOSPHATE AND BETAMETHASONE ACETATE 3; 3 MG/ML; MG/ML
6 INJECTION, SUSPENSION INTRA-ARTICULAR; INTRALESIONAL; INTRAMUSCULAR; SOFT TISSUE
Status: COMPLETED | OUTPATIENT
Start: 2025-01-21 | End: 2025-01-21

## 2025-01-21 RX ADMIN — ROPIVACAINE HYDROCHLORIDE 2 ML: 5 INJECTION, SOLUTION EPIDURAL; INFILTRATION; PERINEURAL at 14:35

## 2025-01-21 RX ADMIN — BETAMETHASONE SODIUM PHOSPHATE AND BETAMETHASONE ACETATE 6 MG: 3; 3 INJECTION, SUSPENSION INTRA-ARTICULAR; INTRALESIONAL; INTRAMUSCULAR; SOFT TISSUE at 14:35

## 2025-01-21 NOTE — PATIENT INSTRUCTIONS
[facility] Injection Discharge Instructions    Procedure: left sinus tarsi cortisone injection    You may shower, however avoid swimming, tub baths or hot tubs for 24 hours following your procedure  You may have a mild to moderate increase in pain for several days following the injection.  It may take up to 14 days for the steroid medication to start working although you may feel the effect as early as a few days after the procedure.  You may use ice packs for 10-15 minutes, 3 to 4 times a day at the injection site for comfort  You may use anti-inflammatory medications (such as Ibuprofen or Aleve or Advil) or Tylenol for pain control if necessary and allowed to by your regular doctor  If you were fasting, you may resume your normal diet and medications after the procedure  If you have diabetes, check your blood sugar more frequently than usual as your blood sugar may be higher than normal for 10-14 days following a steroid injection. Contact your doctor who manages your diabetes if your blood sugar is higher than usual    If you experience any of the following, call [facility] @ [phone] or [phone]  - Fever over 100 degree F  - Swelling, bleeding, redness, drainage, warmth at the injection site  - New or worsening pain    Follow-up: with referring provider as planned.   - Could repeat injection after 3-4 months if injection was helpful but pain returns   - if no or limited relief, we could try the joint just next to the one we tried today

## 2025-01-21 NOTE — LETTER
1/21/2025      Divina Delgadillo  [address]  [place] [address]      Dear Colleague,    Thank you for referring your patient, Divina Delgadillo, to the [facility]. Please see a copy of my visit note below.    ASSESSMENT & PLAN    Divina was seen today for pain.    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    Patient was assessed by me and had maximal pain as well as effusion around the sinus tarsi, so was decided to proceed with sinus tarsi injection as below.     Patient was independently assessed by me and was deemed appropriate for this procedure. Risks and benefits were discussed with good understanding including, but not limited to, the risks of bleeding, reaction to the medication, infection, potential delay of any surgical intervention by 3 months, temporary elevation of blood sugars, worsening or rapid acceleration of arthritis, and the possibility of the treatment being ineffective. The patient tolerated the procedure well with no complications.    After visit care instructions were discussed in person and provided in AVS.    Follow-up: with referring provider as planned.   - Could repeat injection after 3-4 months if injection was helpful but pain returns   - if no or limited relief after 4 weeks, we could try the calcaneocuboid/ midfoot joint if this area is more painful    Jason Sheldon MD  [facility]    SUBJECTIVE- January 21, 2025    Chief Complaint   Patient presents with     Left Foot - Pain     injection       Divina Delgadillo is a 39 year old female  who is seen for ultrasound guided steroid injection to the sinus tarsi and calcaneocuboid joint, left.    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      OBJECTIVE:  There were no vitals taken for this visit.   General: healthy, alert and in no distress  Skin: no suspicious lesions or rash noted near area of injection   CV: distal perfusion intact near area of injection  Neuro: Normal light sensory exam near area of injection    RADIOLOGY:  I independently reviewed and agree with the final results and radiologist's interpretation from the imaging relevant to today's visit, available in the [facility] health record.       Hemoglobin A1C   Date Value Ref Range Status   10/29/2024 8.1 (H) 0.0 - 5.6 % Final     Comment:     Normal <5.7%   Prediabetes 5.7-6.4%    Diabetes 6.5% or higher     Note: Adopted from ADA consensus guidelines.   07/08/2021 7.4 (H) 0 - 5.6 % Final     Comment:     Normal <5.7% Prediabetes 5.7-6.4%  Diabetes 6.5% or higher - adopted from ADA   consensus guidelines.       Patient on blood thinners: No    Small Joint Injection/Arthrocentesis    Date/Time: 1/21/2025 2:35 PM    Performed by: Jason Sheldon MD  Authorized by: Jason Sheldon MD  Indications:  Pain  Needle Size:  22 G  Guidance: ultrasound     Approach:  Lateral  Location:  Foot   Location comment:  L sinus tarsi                      Medications:  6 mg betamethasone acet & sod phos 6 (3-3) MG/ML; 2 mL ROPivacaine 5 MG/ML        Outcome:  Tolerated well, no immediate complications  Procedure discussed: discussed risks, benefits, and alternatives    Consent Given by:  Patient  Timeout: timeout called immediately prior to procedure    Prep: patient was prepped and draped in usual sterile fashion        Patient tolerated left sinus tarsi cortisone injection. Ultrasound guidance was required to ensure correct needle placement for injection  and avoid vital surrounding structures. Ultrasound guided images were permanently stored. Aftercare instructions given to patient. Plan to follow-up as above.     Jason Sheldon MD             Again, thank you for allowing me to participate in the care of your patient.        Sincerely,        Jason Sheldon MD    Electronically signed

## 2025-01-21 NOTE — PROGRESS NOTES
ASSESSMENT & PLAN    Divina was seen today for pain.    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    Patient was assessed by me and had maximal pain as well as effusion around the sinus tarsi, so was decided to proceed with sinus tarsi injection as below.     Patient was independently assessed by me and was deemed appropriate for this procedure. Risks and benefits were discussed with good understanding including, but not limited to, the risks of bleeding, reaction to the medication, infection, potential delay of any surgical intervention by 3 months, temporary elevation of blood sugars, worsening or rapid acceleration of arthritis, and the possibility of the treatment being ineffective. The patient tolerated the procedure well with no complications.    After visit care instructions were discussed in person and provided in AVS.    Follow-up: with referring provider as planned.   - Could repeat injection after 3-4 months if injection was helpful but pain returns   - if no or limited relief after 4 weeks, we could try the calcaneocuboid/ midfoot joint if this area is more painful    Jason Sheldon MD  [facility]- January 21, 2025    Chief Complaint   Patient presents with    Left Foot - Pain     injection       Divina Delgadillo is a 39 year old female who is seen for ultrasound guided steroid injection to the sinus tarsi and calcaneocuboid joint, left.    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      OBJECTIVE:  There were no vitals taken for this visit.   General: healthy, alert and in no distress  Skin: no suspicious lesions or rash noted near area of injection   CV: distal perfusion intact near area of injection  Neuro: Normal light sensory exam near area of injection    RADIOLOGY:  I independently reviewed and agree with the final results and radiologist's interpretation from the imaging relevant to today's visit, available in the [facility] health record.       Hemoglobin A1C   Date Value Ref Range Status   10/29/2024 8.1 (H) 0.0 - 5.6 % Final     Comment:     Normal <5.7%   Prediabetes 5.7-6.4%    Diabetes 6.5% or higher     Note: Adopted from ADA consensus guidelines.   07/08/2021 7.4 (H) 0 - 5.6 % Final     Comment:     Normal <5.7% Prediabetes 5.7-6.4%  Diabetes 6.5% or higher - adopted from ADA   consensus guidelines.       Patient on blood thinners: No    Small Joint Injection/Arthrocentesis    Date/Time: 1/21/2025 2:35 PM    Performed by: Jason Sheldon MD  Authorized by: Jason Sheldon MD  Indications:  Pain  Needle Size:  22 G  Guidance: ultrasound     Approach:  Lateral  Location:  Foot   Location comment:  L sinus tarsi                      Medications:  6 mg betamethasone acet & sod phos 6 (3-3) MG/ML; 2 mL ROPivacaine 5 MG/ML        Outcome:  Tolerated well, no immediate complications  Procedure discussed: discussed risks, benefits, and alternatives    Consent Given by:  Patient  Timeout: timeout called immediately prior to procedure    Prep: patient was prepped and draped in usual sterile fashion       Patient tolerated left sinus tarsi cortisone injection. Ultrasound guidance was required to ensure correct needle placement for injection  and avoid vital surrounding structures. Ultrasound guided images were permanently stored. Aftercare instructions given to  patient. Plan to follow-up as above.     Jason Sheldon MD

## 2025-01-22 ENCOUNTER — OFFICE VISIT (OUTPATIENT)
Dept: AUDIOLOGY | Facility: CLINIC | Age: 40
End: 2025-01-22
Payer: COMMERCIAL

## 2025-01-22 DIAGNOSIS — H90.6 MIXED HEARING LOSS, BILATERAL: Primary | ICD-10-CM

## 2025-01-22 PROCEDURE — 92591 PR HEARING AID EXAM BINAURAL: CPT | Performed by: AUDIOLOGIST

## 2025-01-22 NOTE — PROGRESS NOTES
AUDIOLOGY REPORT    SUBJECTIVE: Divina Delgadillo is a 39 year old female was seen in the Audiology Clinic at [facility] on 1/22/25 to discuss concerns with hearing and functional communication difficulties. Divina has been seen previously on 11/14/2024, and results revealed a bilateral mixed hearing loss.  The patient was medically evaluated and determined to be cleared for binaural hearing aids by Dr. Renteria. Divina notes difficulty with communication in a variety of listening situations.    OBJECTIVE:  Patient is a hearing aid candidate. Patient would like to move forward with a hearing aid evaluation today. Therefore, the patient was presented with different options for amplification to help aid in communication. Discussed styles, levels of technology and monaural vs. binaural fitting.     The hearing aid(s) mutually chosen were:  Binaural: ReSound Nexia 7R  COLOR: 76 Silver  BATTERY SIZE: rechargeable  EARMOLD/TIPS: Tulip domes   CANAL/ LENGTH: 1 MP L/R    ASSESSMENT:   Bilateral mixed hearing loss    Reviewed purchase information and warranty information with patient. The 45 day trial period was explained to patient. The patient was given a copy of the [place] [employer] consumer brochure on purchasing hearing instruments. Patient risk factors have been provided to the patient in writing prior to the sale of the hearing aid per FDA regulation. The risk factors are also available in the User Instructional Booklet to be presented on the day of the hearing aid fitting. Hearing aid(s) ordered. Hearing aid evaluation completed. Patient was advised to check with their insurance to ascertain of they are eligible for any hearing aid benefits.     PLAN: Divina is scheduled to return in 2-3 weeks for a hearing aid fitting and programming. Purchase agreement will be completed on that date. Please contact this clinic with any questions or concerns.      Tommy Gomez  Au.D CCC-ANGELES  Licensed Audiologist #[id number]  1/22/2025

## 2025-01-27 ENCOUNTER — LAB (OUTPATIENT)
Dept: LAB | Facility: CLINIC | Age: 40
End: 2025-01-27
Payer: COMMERCIAL

## 2025-01-27 DIAGNOSIS — F25.0 SCHIZOAFFECTIVE DISORDER, BIPOLAR TYPE (H): ICD-10-CM

## 2025-01-27 DIAGNOSIS — Z79.899 HIGH RISK MEDICATION USE: ICD-10-CM

## 2025-01-27 LAB
BASOPHILS # BLD AUTO: 0.1 10E3/UL (ref 0–0.2)
BASOPHILS NFR BLD AUTO: 0 %
EOSINOPHIL # BLD AUTO: 0.4 10E3/UL (ref 0–0.7)
EOSINOPHIL NFR BLD AUTO: 2 %
ERYTHROCYTE [DISTWIDTH] IN BLOOD BY AUTOMATED COUNT: 14.7 % (ref 10–15)
HCT VFR BLD AUTO: 37.8 % (ref 35–47)
HGB BLD-MCNC: 11.8 G/DL (ref 11.7–15.7)
IMM GRANULOCYTES # BLD: 0.1 10E3/UL
IMM GRANULOCYTES NFR BLD: 1 %
LYMPHOCYTES # BLD AUTO: 3.9 10E3/UL (ref 0.8–5.3)
LYMPHOCYTES NFR BLD AUTO: 22 %
MCH RBC QN AUTO: 29.3 PG (ref 26.5–33)
MCHC RBC AUTO-ENTMCNC: 31.2 G/DL (ref 31.5–36.5)
MCV RBC AUTO: 94 FL (ref 78–100)
MONOCYTES # BLD AUTO: 0.8 10E3/UL (ref 0–1.3)
MONOCYTES NFR BLD AUTO: 5 %
NEUTROPHILS # BLD AUTO: 12.3 10E3/UL (ref 1.6–8.3)
NEUTROPHILS NFR BLD AUTO: 70 %
PLATELET # BLD AUTO: 407 10E3/UL (ref 150–450)
RBC # BLD AUTO: 4.03 10E6/UL (ref 3.8–5.2)
WBC # BLD AUTO: 17.6 10E3/UL (ref 4–11)

## 2025-01-27 PROCEDURE — 85025 COMPLETE CBC W/AUTO DIFF WBC: CPT

## 2025-01-27 PROCEDURE — 36415 COLL VENOUS BLD VENIPUNCTURE: CPT

## 2025-01-28 ENCOUNTER — MYC REFILL (OUTPATIENT)
Dept: FAMILY MEDICINE | Facility: CLINIC | Age: 40
End: 2025-01-28
Payer: COMMERCIAL

## 2025-01-28 DIAGNOSIS — J45.21 MILD INTERMITTENT ASTHMA WITH ACUTE EXACERBATION: ICD-10-CM

## 2025-01-29 DIAGNOSIS — J45.21 MILD INTERMITTENT ASTHMA WITH ACUTE EXACERBATION: ICD-10-CM

## 2025-01-29 RX ORDER — ALBUTEROL SULFATE 90 UG/1
INHALANT RESPIRATORY (INHALATION)
Qty: 18 G | Refills: 5 | Status: SHIPPED | OUTPATIENT
Start: 2025-01-29

## 2025-01-30 RX ORDER — ALBUTEROL SULFATE 90 UG/1
INHALANT RESPIRATORY (INHALATION)
Qty: 18 G | Refills: 9 | OUTPATIENT
Start: 2025-01-30

## 2025-02-02 ENCOUNTER — HOSPITAL ENCOUNTER (EMERGENCY)
Facility: CLINIC | Age: 40
Discharge: HOME | End: 2025-02-03
Attending: EMERGENCY MEDICINE | Admitting: EMERGENCY MEDICINE
Payer: COMMERCIAL

## 2025-02-02 DIAGNOSIS — R10.10 PAIN OF UPPER ABDOMEN: ICD-10-CM

## 2025-02-02 DIAGNOSIS — Z79.4 TYPE 2 DIABETES MELLITUS WITH STAGE 3B CHRONIC KIDNEY DISEASE, WITH LONG-TERM CURRENT USE OF INSULIN (H): ICD-10-CM

## 2025-02-02 DIAGNOSIS — Z86.2 H/O LEUKOCYTOSIS: Chronic | ICD-10-CM

## 2025-02-02 DIAGNOSIS — N18.32 TYPE 2 DIABETES MELLITUS WITH STAGE 3B CHRONIC KIDNEY DISEASE, WITH LONG-TERM CURRENT USE OF INSULIN (H): ICD-10-CM

## 2025-02-02 DIAGNOSIS — E11.22 TYPE 2 DIABETES MELLITUS WITH STAGE 3B CHRONIC KIDNEY DISEASE, WITH LONG-TERM CURRENT USE OF INSULIN (H): ICD-10-CM

## 2025-02-02 DIAGNOSIS — K21.9 GASTROESOPHAGEAL REFLUX DISEASE, UNSPECIFIED WHETHER ESOPHAGITIS PRESENT: Chronic | ICD-10-CM

## 2025-02-02 LAB
ALBUMIN SERPL BCG-MCNC: 3.4 G/DL (ref 3.5–5.2)
ALBUMIN UR-MCNC: NEGATIVE MG/DL
ALP SERPL-CCNC: 187 U/L (ref 40–150)
ALT SERPL W P-5'-P-CCNC: 33 U/L (ref 0–50)
ANION GAP SERPL CALCULATED.3IONS-SCNC: 11 MMOL/L (ref 7–15)
APPEARANCE UR: CLEAR
AST SERPL W P-5'-P-CCNC: 35 U/L (ref 0–45)
BILIRUB SERPL-MCNC: <0.2 MG/DL
BILIRUB UR QL STRIP: NEGATIVE
BUN SERPL-MCNC: 30.4 MG/DL (ref 6–20)
CALCIUM SERPL-MCNC: 8.5 MG/DL (ref 8.8–10.4)
CHLORIDE SERPL-SCNC: 105 MMOL/L (ref 98–107)
COLOR UR AUTO: ABNORMAL
CREAT SERPL-MCNC: 1.63 MG/DL (ref 0.51–0.95)
EGFRCR SERPLBLD CKD-EPI 2021: 41 ML/MIN/1.73M2
ERYTHROCYTE [DISTWIDTH] IN BLOOD BY AUTOMATED COUNT: 15.3 % (ref 10–15)
GLUCOSE SERPL-MCNC: 198 MG/DL (ref 70–99)
GLUCOSE UR STRIP-MCNC: NEGATIVE MG/DL
HCG UR QL: NEGATIVE
HCO3 SERPL-SCNC: 22 MMOL/L (ref 22–29)
HCT VFR BLD AUTO: 36.2 % (ref 35–47)
HGB BLD-MCNC: 10.9 G/DL (ref 11.7–15.7)
HGB UR QL STRIP: NEGATIVE
HOLD SPECIMEN: NORMAL
HOLD SPECIMEN: NORMAL
HYALINE CASTS: 3 /LPF
KETONES UR STRIP-MCNC: NEGATIVE MG/DL
LEUKOCYTE ESTERASE UR QL STRIP: NEGATIVE
LIPASE SERPL-CCNC: 58 U/L (ref 13–60)
MCH RBC QN AUTO: 29.9 PG (ref 26.5–33)
MCHC RBC AUTO-ENTMCNC: 30.1 G/DL (ref 31.5–36.5)
MCV RBC AUTO: 99 FL (ref 78–100)
NITRATE UR QL: NEGATIVE
PH UR STRIP: 6 [PH] (ref 5–7)
PLATELET # BLD AUTO: 388 10E3/UL (ref 150–450)
POTASSIUM SERPL-SCNC: 4.7 MMOL/L (ref 3.4–5.3)
PROT SERPL-MCNC: 7.2 G/DL (ref 6.4–8.3)
RBC # BLD AUTO: 3.65 10E6/UL (ref 3.8–5.2)
RBC URINE: 1 /HPF
SODIUM SERPL-SCNC: 138 MMOL/L (ref 135–145)
SP GR UR STRIP: 1.01 (ref 1–1.03)
SQUAMOUS EPITHELIAL: <1 /HPF
TROPONIN T SERPL HS-MCNC: <6 NG/L
UROBILINOGEN UR STRIP-MCNC: NORMAL MG/DL
WBC # BLD AUTO: 20.4 10E3/UL (ref 4–11)
WBC URINE: 2 /HPF

## 2025-02-02 PROCEDURE — 93005 ELECTROCARDIOGRAM TRACING: CPT | Performed by: EMERGENCY MEDICINE

## 2025-02-02 PROCEDURE — 81025 URINE PREGNANCY TEST: CPT | Performed by: EMERGENCY MEDICINE

## 2025-02-02 PROCEDURE — 250N000011 HC RX IP 250 OP 636: Mod: JW | Performed by: EMERGENCY MEDICINE

## 2025-02-02 PROCEDURE — 85027 COMPLETE CBC AUTOMATED: CPT | Performed by: EMERGENCY MEDICINE

## 2025-02-02 PROCEDURE — 84484 ASSAY OF TROPONIN QUANT: CPT | Performed by: EMERGENCY MEDICINE

## 2025-02-02 PROCEDURE — 36415 COLL VENOUS BLD VENIPUNCTURE: CPT | Performed by: EMERGENCY MEDICINE

## 2025-02-02 PROCEDURE — 81003 URINALYSIS AUTO W/O SCOPE: CPT | Performed by: EMERGENCY MEDICINE

## 2025-02-02 PROCEDURE — 93010 ELECTROCARDIOGRAM REPORT: CPT | Performed by: EMERGENCY MEDICINE

## 2025-02-02 PROCEDURE — 82565 ASSAY OF CREATININE: CPT | Performed by: EMERGENCY MEDICINE

## 2025-02-02 PROCEDURE — 96374 THER/PROPH/DIAG INJ IV PUSH: CPT | Mod: 59 | Performed by: EMERGENCY MEDICINE

## 2025-02-02 PROCEDURE — 85007 BL SMEAR W/DIFF WBC COUNT: CPT | Performed by: EMERGENCY MEDICINE

## 2025-02-02 PROCEDURE — 80051 ELECTROLYTE PANEL: CPT | Performed by: EMERGENCY MEDICINE

## 2025-02-02 PROCEDURE — 83690 ASSAY OF LIPASE: CPT | Performed by: EMERGENCY MEDICINE

## 2025-02-02 PROCEDURE — 99285 EMERGENCY DEPT VISIT HI MDM: CPT | Performed by: EMERGENCY MEDICINE

## 2025-02-02 PROCEDURE — 84075 ASSAY ALKALINE PHOSPHATASE: CPT | Performed by: EMERGENCY MEDICINE

## 2025-02-02 PROCEDURE — 99285 EMERGENCY DEPT VISIT HI MDM: CPT | Mod: 25 | Performed by: EMERGENCY MEDICINE

## 2025-02-02 PROCEDURE — 85379 FIBRIN DEGRADATION QUANT: CPT | Performed by: EMERGENCY MEDICINE

## 2025-02-02 RX ORDER — HYDROMORPHONE HYDROCHLORIDE 1 MG/ML
0.3 INJECTION, SOLUTION INTRAMUSCULAR; INTRAVENOUS; SUBCUTANEOUS
Status: DISCONTINUED | OUTPATIENT
Start: 2025-02-02 | End: 2025-02-03 | Stop reason: HOSPADM

## 2025-02-02 RX ADMIN — HYDROMORPHONE HYDROCHLORIDE 0.3 MG: 1 INJECTION, SOLUTION INTRAMUSCULAR; INTRAVENOUS; SUBCUTANEOUS at 22:55

## 2025-02-02 ASSESSMENT — ACTIVITIES OF DAILY LIVING (ADL)
ADLS_ACUITY_SCORE: 42
ADLS_ACUITY_SCORE: 42

## 2025-02-02 ASSESSMENT — COLUMBIA-SUICIDE SEVERITY RATING SCALE - C-SSRS
6. HAVE YOU EVER DONE ANYTHING, STARTED TO DO ANYTHING, OR PREPARED TO DO ANYTHING TO END YOUR LIFE?: NO
2. HAVE YOU ACTUALLY HAD ANY THOUGHTS OF KILLING YOURSELF IN THE PAST MONTH?: NO
1. IN THE PAST MONTH, HAVE YOU WISHED YOU WERE DEAD OR WISHED YOU COULD GO TO SLEEP AND NOT WAKE UP?: NO

## 2025-02-03 ENCOUNTER — TELEPHONE (OUTPATIENT)
Dept: NEPHROLOGY | Facility: CLINIC | Age: 40
End: 2025-02-03
Payer: COMMERCIAL

## 2025-02-03 ENCOUNTER — APPOINTMENT (OUTPATIENT)
Dept: CT IMAGING | Facility: CLINIC | Age: 40
End: 2025-02-03
Attending: EMERGENCY MEDICINE
Payer: COMMERCIAL

## 2025-02-03 VITALS
DIASTOLIC BLOOD PRESSURE: 55 MMHG | BODY MASS INDEX: 41.64 KG/M2 | SYSTOLIC BLOOD PRESSURE: 140 MMHG | HEIGHT: 63 IN | WEIGHT: 235 LBS | TEMPERATURE: 98.6 F | OXYGEN SATURATION: 93 % | HEART RATE: 78 BPM | RESPIRATION RATE: 18 BRPM

## 2025-02-03 DIAGNOSIS — D64.9 ANEMIA, UNSPECIFIED TYPE: Primary | ICD-10-CM

## 2025-02-03 DIAGNOSIS — N25.81 SECONDARY RENAL HYPERPARATHYROIDISM: ICD-10-CM

## 2025-02-03 LAB
ATRIAL RATE - MUSE: 81 BPM
BASOPHILS # BLD MANUAL: 0.4 10E3/UL (ref 0–0.2)
BASOPHILS NFR BLD MANUAL: 2 %
D DIMER PPP FEU-MCNC: 0.48 UG/ML FEU (ref 0–0.5)
DACRYOCYTES BLD QL SMEAR: SLIGHT
DIASTOLIC BLOOD PRESSURE - MUSE: NORMAL MMHG
EOSINOPHIL # BLD MANUAL: 0.6 10E3/UL (ref 0–0.7)
EOSINOPHIL NFR BLD MANUAL: 3 %
FRAGMENTS BLD QL SMEAR: ABNORMAL
INTERPRETATION ECG - MUSE: NORMAL
LYMPHOCYTES # BLD MANUAL: 5.7 10E3/UL (ref 0.8–5.3)
LYMPHOCYTES NFR BLD MANUAL: 28 %
MONOCYTES # BLD MANUAL: 1 10E3/UL (ref 0–1.3)
MONOCYTES NFR BLD MANUAL: 5 %
NEUTROPHILS # BLD MANUAL: 12.6 10E3/UL (ref 1.6–8.3)
NEUTROPHILS NFR BLD MANUAL: 62 %
P AXIS - MUSE: 44 DEGREES
PATH REV: ABNORMAL
PLAT MORPH BLD: ABNORMAL
PR INTERVAL - MUSE: 158 MS
QRS DURATION - MUSE: 92 MS
QT - MUSE: 390 MS
QTC - MUSE: 453 MS
R AXIS - MUSE: 40 DEGREES
RBC MORPH BLD: ABNORMAL
SYSTOLIC BLOOD PRESSURE - MUSE: NORMAL MMHG
T AXIS - MUSE: 47 DEGREES
VENTRICULAR RATE- MUSE: 81 BPM

## 2025-02-03 PROCEDURE — 250N000011 HC RX IP 250 OP 636: Performed by: EMERGENCY MEDICINE

## 2025-02-03 PROCEDURE — 250N000009 HC RX 250: Performed by: EMERGENCY MEDICINE

## 2025-02-03 PROCEDURE — 258N000003 HC RX IP 258 OP 636: Performed by: EMERGENCY MEDICINE

## 2025-02-03 PROCEDURE — 96375 TX/PRO/DX INJ NEW DRUG ADDON: CPT | Performed by: EMERGENCY MEDICINE

## 2025-02-03 PROCEDURE — 74177 CT ABD & PELVIS W/CONTRAST: CPT

## 2025-02-03 RX ORDER — OXYCODONE HYDROCHLORIDE 5 MG/1
5 TABLET ORAL EVERY 6 HOURS PRN
Qty: 6 TABLET | Refills: 0 | Status: SHIPPED | OUTPATIENT
Start: 2025-02-03 | End: 2025-02-06

## 2025-02-03 RX ORDER — IOPAMIDOL 755 MG/ML
100 INJECTION, SOLUTION INTRAVASCULAR ONCE
Status: COMPLETED | OUTPATIENT
Start: 2025-02-03 | End: 2025-02-03

## 2025-02-03 RX ADMIN — IOPAMIDOL 100 ML: 755 INJECTION, SOLUTION INTRAVENOUS at 01:01

## 2025-02-03 RX ADMIN — FAMOTIDINE 20 MG: 10 INJECTION INTRAVENOUS at 01:17

## 2025-02-03 RX ADMIN — SODIUM CHLORIDE 500 ML: 9 INJECTION, SOLUTION INTRAVENOUS at 01:17

## 2025-02-03 RX ADMIN — SODIUM CHLORIDE 69 ML: 9 INJECTION, SOLUTION INTRAVENOUS at 01:01

## 2025-02-03 ASSESSMENT — ACTIVITIES OF DAILY LIVING (ADL): ADLS_ACUITY_SCORE: 42

## 2025-02-03 NOTE — ED PROVIDER NOTES
39-year-old medically complex female who presents for abdominal and flank pain.  So far workup is reassuring and she is currently awaiting CT of the abdomen and pelvis at time of signout.    CT of the abdomen and pelvis is obtained, images interpreted independently as well as radiology read reviewed, reassuring CT scan without definite cause of the patient's symptoms identified on this.  Unclear cause of his symptoms but at this time does not appear to be pulmonary embolism, ACS, or an acute surgical process within the abdomen or pelvis.  She is safe to discharge home with instructions to try to increase her omeprazole, she is given a short course of oxycodone for severe pain and is told to return if worse, otherwise follow-up in clinic.  The patient is in agreement with this plan.       Tyson Do MD  02/03/25 [id number]

## 2025-02-03 NOTE — DISCHARGE INSTRUCTIONS
Your evaluation in the ED was reassuring, however no cause of your pain was identified.     Follow up with your primary care team as soon as able.     Could consider increasing your dose of omeprazole to 40mg daily for one week to see if this improves your symptoms.     Oxycodone for severe pain.     If your symptoms worsen or you develop new or concerning symptoms, please return to the Emergency Department for further evaluation and treatment.

## 2025-02-03 NOTE — TELEPHONE ENCOUNTER
[facility] Call Center    Phone Message    May a detailed message be left on voicemail: yes     Reason for Call: Other: Divina called asking to get her appt moved up from 2.24.25 due to her ER visit 2.2.2025. Please call patient to discuss if she needs to be seen sooner that the 24th.      Action Taken: Other: [facility]    Travel Screening: Not Applicable     Date of Service:

## 2025-02-03 NOTE — ED TRIAGE NOTES
Comes from home with about 2 hours of right sided chest pain and right flank pain.     Lungs are clear bilateral. No recent period due to her IUD. No pain in the abdomen.     Pain to the right side of her chest that gets worse with breathing. Pain to the right flank to palpation. No reported HX of back surgeries. No radiation down her legs of pain or numbness. No incontinent episodes. No saddle numbness.  Denies abnormal discharge. No bowel problems. Dis states blood in her urine yesterday. Unknown period due to her IUD.

## 2025-02-03 NOTE — TELEPHONE ENCOUNTER
Called and spoke with pt.  Informed Dr. Dias has been updated on her ER visit.  Once Dr. Dias has reviewed pts chart, she can expect a return call with recommendations.    Pt verbalized understanding and agreed with plan.    Dionne Fuller LPN

## 2025-02-03 NOTE — ED PROVIDER NOTES
"  History     Chief Complaint   Patient presents with    Flank Pain     Right sided    Chest Pain     Right sided     HPI  Divina Delgadillo is a 39 year old female with complex past medical history of malignancy status post a splenectomy, left adrenalectomy, partial pancreatectomy (2015), insulin-dependent diabetes type 2, borderline personality disorder, history of DVT, CKD 3, chronic leukocytosis, who presents for report of chronic abdominal pain.  Symptoms started about 2 hours prior to arrival.  Says she feels like she \"swallowed something\" and has a burning sensation in her central and upper abdomen and lower central chest.  Nursing notes say pleuritic pain in right side of chest and right flank pain.  Patient denies any fevers or cough.  No upper URI symptoms.  No discomfort with urination or frequency.  Is on many medications and cannot remember the mall but states she is taking things as prescribed.  Does have a glucose monitor and insulin pump.    The patient's PMHx, Surgical Hx, Allergies, and Medications were all reviewed with the patient.    Allergies:  No Known Allergies    Problem List:    Patient Active Problem List    Diagnosis Date Noted    Secondary renal hyperparathyroidism 10/30/2024     Priority: Medium    Peroneal tendinitis of left lower extremity 06/14/2024     Priority: Medium    Acute left ankle pain 06/14/2024     Priority: Medium    Avascular necrosis of bone of ankle (H) 06/14/2024     Priority: Medium    Other fracture of left femur, initial encounter for closed fracture (H) 01/21/2024     Priority: Medium    Chiari malformation type I (H) 06/12/2023     Priority: Medium    Ulnar neuropathy of both upper extremities 09/20/2022     Priority: Medium    Insomnia, unspecified type 08/30/2021     Priority: Medium    History of DVT of arm  (deep vein thrombosis) 01/23/2021     Priority: Medium     ultrasound 1/6/2017-  venous thrombus in the antecubital fossa region and the left forearm as " described      Schizoaffective disorder, depressive type (H) 07/19/2019     Priority: Medium    Acquired asplenia 03/22/2019     Priority: Medium    Chronic leukocytosis 11/27/2018     Priority: Medium     Due to spleen removal      Nicotine abuse 08/13/2018     Priority: Medium    Mild intermittent asthma with acute exacerbation 01/16/2018     Priority: Medium    Morbid obesity (H) 01/09/2018     Priority: Medium    IUD (intrauterine device) in place 07/26/2017     Priority: Medium     Mirena IUD inserted in office with premed 7/26/2017        Cervical high risk HPV (human papillomavirus) test positive 06/20/2017     Priority: Medium     6/15/17 NIL, +HR HPV (not 16/18). Plan cotest in 1 year  6/14/18 NIL pap, neg HPV. Plan cotest in 3 years  3/26/21 NIL pap, Neg HPV. Plan cotest in 3 years.   4/24/24 NIL pap, +HR HPV (not 16/18). 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, splenectomy, left adrenalectomy 02/26/2015;      Type 2 diabetes mellitus with stage 3 chronic kidney disease, with long-term current use of insulin (H) 02/27/2017     Priority: Medium    Bipolar disorder (H) 02/27/2017     Priority: Medium    Orthostatic hypotension 01/10/2017     Priority: Medium    Tobacco abuse 01/10/2017     Priority: Medium    Long term (current) use of anticoagulants 01/09/2017     Priority: Medium    Deep venous thrombosis of arm (H) 01/09/2017     Priority: Medium    Stage 3 chronic kidney disease 12/03/2015     Priority: Medium     Overview:   Updated per 10/1/17 IMO import      Neutrophilic leukocytosis 08/28/2015     Priority: Medium    Vitamin D insufficiency 06/01/2015     Priority: Medium    Cardiac murmur 08/20/2013     Priority: Medium    Depressive disorder 09/15/2012     Priority: Medium    Hyperlipidemia LDL goal <100 10/31/2010      Priority: Medium    Borderline personality disorder (H) 11/06/2009     Priority: Medium    Essential hypertension 06/13/2008     Priority: Medium    NAFL (nonalcoholic fatty liver) 04/11/2006     Priority: Medium     See flowsheet for hepatic panel.   Stopped zocor, was on godon as well had right upper quandrant ultrasound and ct  ? Psych med related vs SA[facility]NI      Esophageal reflux 04/14/2005     Priority: Medium     GERD      PTSD (post-traumatic stress disorder) 01/01/2001     Priority: Medium        Past Medical History:    Past Medical History:   Diagnosis Date    Acquired asplenia     Acute pain of left knee 03/22/2019    Acute-on-chronic kidney injury 03/22/2019    ARF (acute renal failure) 03/23/2019    Asthma     Bipolar disorder (H)     Cardiac murmur     Cervical high risk HPV (human papillomavirus) test positive 06/20/2017    Chiari malformation type I (H)     Chronic bilateral low back pain without sciatica     Deep venous thrombosis of arm (H) 01/06/2017    Depressive disorder     DVT (deep venous thrombosis) (H)     DVT of upper extremity (deep vein thrombosis) (H) 2021    Esophageal reflux     Hypertension     Insomnia     Leukocytosis     Mild intermittent asthma     Morbid obesity (H)     Mucinous neoplasm of pancreas     NAFL (nonalcoholic fatty liver)     Neck pain     Nicotine abuse     Other specified types of schizophrenia, unspecified condition     Schizoaffective disorder, depressive type (H)     Stage 3a chronic kidney disease (CKD) (H)     Type 2 diabetes mellitus (H)     Ulnar neuropathy of both upper extremities     Vitamin D insufficiency        Past Surgical History:    Past Surgical History:   Procedure Laterality Date    ADRENALECTOMY Left 2015    BIOPSY      BREAST SURGERY  2013    COLONOSCOPY      ENT SURGERY  10/01/2000    Tympanoplasty    IR LIVER BIOPSY PERCUTANEOUS  11/14/2019    PANCREATECTOMY PARTIAL  2015    PERCUTANEOUS BIOPSY LIVER Right 11/14/2019    Procedure:  Percutaneous Liver Biopsy;  Surgeon: Sean Guzman PA-C;  Location: [facility] OR    SPLENECTOMY  2015    TONSILLECTOMY  10/01/2000    Tonsillectomy       Family History:    Family History   Problem Relation Age of Onset    Respiratory Mother         ASTHMA    Allergies Mother     Depression Mother     Chronic Obstructive Pulmonary Disease Mother     Anxiety Disorder Mother     Mental Illness Mother     Asthma Mother     Heart Disease Father         HEART VALVE REPLACEMENT    Hypertension Father     Diabetes Father     Depression Father     Anxiety Disorder Father     Mental Illness Father     Obesity Father     Respiratory Sister     Allergies Sister     Depression Sister     Respiratory Sister     Allergies Sister     Depression Sister     Respiratory Brother     Allergies Brother     Sleep Apnea Maternal Grandfather     Kidney Disease No family hx of        Social History:  Marital Status:  Single [1]  Social History     Tobacco Use    Smoking status: Every Day     Current packs/day: 0.00     Average packs/day: 0.5 packs/day for 0.2 years (0.1 ttl pk-yrs)     Types: Cigarettes, Vaping Device     Start date: 2023     Last attempt to quit: 2024     Years since quittin.9     Passive exposure: Yes    Smokeless tobacco: Never    Tobacco comments:     E- cig   Vaping Use    Vaping status: Every Day    Substances: Nicotine, Flavoring    Devices: Disposable   Substance Use Topics    Alcohol use: No     Comment: sober since     Drug use: No        Medications:    oxyCODONE (ROXICODONE) 5 MG tablet  ACCU-CHEK GUIDE test strip  acetaminophen (TYLENOL) 500 MG tablet  acetaminophen (TYLENOL) 500 MG tablet  albuterol (VENTOLIN HFA) 108 (90 Base) MCG/ACT inhaler  alendronate (FOSAMAX) 70 MG tablet  ARIPiprazole (ABILIFY) 15 MG tablet  ARIPiprazole (ABILIFY) 30 MG tablet  atorvastatin (LIPITOR) 20 MG tablet  Biotin 5 MG TABS  blood glucose (ACCU-CHEK GUIDE) test strip  bumetanide (BUMEX) 0.5 MG  "tablet  carvedilol (COREG) 12.5 MG tablet  cloZAPine (CLOZARIL) 50 MG tablet  diclofenac (VOLTAREN) 1 % topical gel  docusate sodium (COLACE) 100 MG capsule  FLUoxetine (PROZAC) 40 MG capsule  glucose (BD GLUCOSE) 4 g chewable tablet  hydrOXYzine HCl (ATARAX) 25 MG tablet  insulin aspart (NOVOLOG VIAL) 100 UNITS/ML vial  Insulin Infusion Pump (MINIMED 780G INSULIN PUMP) KIT  Insulin Infusion Pump Supplies (EXTENDED INFUSION SET 23\"/6MM) MISC  Insulin Infusion Pump Supplies (EXTENDED RESERVOIR 3ML) MISC  lamoTRIgine (LAMICTAL) 100 MG tablet  lisinopril (ZESTRIL) 2.5 MG tablet  loratadine (CLARITIN) 10 MG tablet  meclizine (ANTIVERT) 25 MG tablet  nicotine (NICODERM CQ) 14 MG/24HR 24 hr patch  nicotine (NICODERM CQ) 21 MG/24HR 24 hr patch  nicotine (NICODERM CQ) 7 MG/24HR 24 hr patch  nitroGLYcerin 0.2% ointment  omeprazole (PRILOSEC) 20 MG DR capsule  Suvorexant (BELSOMRA) 10 MG tablet  topiramate (TOPAMAX) 25 MG tablet  topiramate (TOPAMAX) 50 MG tablet          Review of Systems  Pertinent positives and negatives mentioned in HPI    Physical Exam   BP: (!) 162/59  Pulse: 82  Temp: 98.6  F (37  C)  Resp: 18  Height: 160 cm (5' 3\")  Weight: 106.6 kg (235 lb)  SpO2: 96 %    GEN: Awake, alert, and cooperative. No acute distress.  CV : Regular rate and rhythm. Systolic murmur  PULM: Normal effort. No wheezes, rales, or rhonchi bilaterally.  ABD: epigastric and RUQ tenderness to palpation. Soft and non distended.  No rebound or guarding.   NEURO: Normal speech. Following commands in all extremities. Answering questions and interacting appropriately.   EXT: No gross deformity. Warm and well perfused. No pitting pretibial edema.   INT: Warm. No diaphoresis. Normal color.        ED Course        Procedures         EKG: Interpreted by Guicho Coulter MD sinus rhythm with rate of 81 bpm.  Normal axis.  Normal R wave progression.  No ST segment elevations or depressions.  No abnormal T wave inversions.  No pathologic Q waves. "  No significant change compared to 5/23/2023.  Impression sinus rhythm with no evidence of acute ischemia    Critical Care time:  none              Results for orders placed or performed during the hospital encounter of 02/02/25 (from the past 24 hours)   CBC with Platelets & Differential    Narrative    The following orders were created for panel order CBC with Platelets & Differential.  Procedure                               Abnormality         Status                     ---------                               -----------         ------                     CBC with platelets and d...[[id number]]  Abnormal            Final result               RBC and Platelet Morphology[[id number]]                                                 Manual Differential[[id number]]          Abnormal            Preliminary result           Please view results for these tests on the individual orders.   Comprehensive metabolic panel   Result Value Ref Range    Sodium 138 135 - 145 mmol/L    Potassium 4.7 3.4 - 5.3 mmol/L    Carbon Dioxide (CO2) 22 22 - 29 mmol/L    Anion Gap 11 7 - 15 mmol/L    Urea Nitrogen 30.4 (H) 6.0 - 20.0 mg/dL    Creatinine 1.63 (H) 0.51 - 0.95 mg/dL    GFR Estimate 41 (L) >60 mL/min/1.73m2    Calcium 8.5 (L) 8.8 - 10.4 mg/dL    Chloride 105 98 - 107 mmol/L    Glucose 198 (H) 70 - 99 mg/dL    Alkaline Phosphatase 187 (H) 40 - 150 U/L    AST 35 0 - 45 U/L    ALT 33 0 - 50 U/L    Protein Total 7.2 6.4 - 8.3 g/dL    Albumin 3.4 (L) 3.5 - 5.2 g/dL    Bilirubin Total <0.2 <=1.2 mg/dL   [place] Draw    Narrative    The following orders were created for panel order [place] Draw.  Procedure                               Abnormality         Status                     ---------                               -----------         ------                     Extra Blue Top Tube[[id number]]                              Final result               Extra Red Top Tube[[id number]]                               Final result                  Please view results for these tests on the individual orders.   CBC with platelets and differential   Result Value Ref Range    WBC Count 20.4 (H) 4.0 - 11.0 10e3/uL    RBC Count 3.65 (L) 3.80 - 5.20 10e6/uL    Hemoglobin 10.9 (L) 11.7 - 15.7 g/dL    Hematocrit 36.2 35.0 - 47.0 %    MCV 99 78 - 100 fL    MCH 29.9 26.5 - 33.0 pg    MCHC 30.1 (L) 31.5 - 36.5 g/dL    RDW 15.3 (H) 10.0 - 15.0 %    Platelet Count 388 150 - 450 10e3/uL   Extra Blue Top Tube   Result Value Ref Range    Hold Specimen JIC    Extra Red Top Tube   Result Value Ref Range    Hold Specimen JIC    Lipase   Result Value Ref Range    Lipase 58 13 - 60 U/L   Manual Differential   Result Value Ref Range    % Neutrophils 62 %    % Lymphocytes 28 %    % Monocytes 5 %    % Eosinophils 3 %    % Basophils 2 %    Absolute Neutrophils 12.6 (H) 1.6 - 8.3 10e3/uL    Absolute Lymphocytes 5.7 (H) 0.8 - 5.3 10e3/uL    Absolute Monocytes 1.0 0.0 - 1.3 10e3/uL    Absolute Eosinophils 0.6 0.0 - 0.7 10e3/uL    Absolute Basophils 0.4 (H) 0.0 - 0.2 10e3/uL    RBC Morphology Confirmed RBC Indices     Platelet Assessment  Automated Count Confirmed. Platelet morphology is normal.     Automated Count Confirmed. Platelet morphology is normal.    RBC Fragments Rare (A) None Seen    Teardrop Cells Slight (A) None Seen    Narrative    Sent for review by Pathologist.  See Pathologist comments after review.     Troponin T, High Sensitivity   Result Value Ref Range    Troponin T, High Sensitivity <6 <=14 ng/L   D dimer quantitative   Result Value Ref Range    D-Dimer Quantitative 0.48 0.00 - 0.50 ug/mL FEU    Narrative    This D-dimer assay is intended for use in conjunction with a clinical pretest probability assessment model to exclude pulmonary embolism (PE) and deep venous thrombosis (DVT) in outpatients suspected of PE or DVT. The cut-off value is 0.50 ug/mL FEU.   HCG qualitative urine   Result Value Ref Range    hCG Urine Qualitative Negative Negative   UA with  Microscopic reflex to Culture    Specimen: Urine, Clean Catch   Result Value Ref Range    Color Urine Light Yellow Colorless, Straw, Light Yellow, Yellow    Appearance Urine Clear Clear    Glucose Urine Negative Negative mg/dL    Bilirubin Urine Negative Negative    Ketones Urine Negative Negative mg/dL    Specific Gravity Urine 1.014 1.003 - 1.035    Blood Urine Negative Negative    pH Urine 6.0 5.0 - 7.0    Protein Albumin Urine Negative Negative mg/dL    Urobilinogen Urine Normal Normal, 2.0 mg/dL    Nitrite Urine Negative Negative    Leukocyte Esterase Urine Negative Negative    RBC Urine 1 <=2 /HPF    WBC Urine 2 <=5 /HPF    Squamous Epithelials Urine <1 <=1 /HPF    Hyaline Casts Urine 3 (H) <=2 /LPF    Narrative    Urine Culture not indicated   EKG 12 lead   Result Value Ref Range    Systolic Blood Pressure  mmHg    Diastolic Blood Pressure  mmHg    Ventricular Rate 81 BPM    Atrial Rate 81 BPM    AR Interval 158 ms    QRS Duration 92 ms     ms    QTc 453 ms    P Axis 44 degrees    R AXIS 40 degrees    T Axis 47 degrees    Interpretation ECG       Sinus rhythm  Normal ECG  When compared with ECG of 21-Apr-2009 16:32,  MANUAL COMPARISON REQUIRED PREVIOUS ECG IS INCOMPATIBLE       *Note: Due to a large number of results and/or encounters for the requested time period, some results have not been displayed. A complete set of results can be found in Results Review.       Medications   HYDROmorphone (PF) (DILAUDID) injection 0.3 mg (0.3 mg Intravenous $Given 2/2/25 7040)   sodium chloride 0.9% BOLUS 500 mL (has no administration in time range)   CT Saline (has no administration in time range)   iopamidol (ISOVUE-370) solution 100 mL (has no administration in time range)   famotidine (PEPCID) injection 20 mg (has no administration in time range)       Assessments & Plan (with Medical Decision Making)   39 year old female with complex past medical history who comes in by EMS from home with complaint of pleuritic  chest pain on right and flank pain.  On my evaluation was complaining of abdominal pain and did have epigastric and right upper quadrant tenderness to palpation.  ECG no evidence of acute ischemia and high sensory troponin below detectable level of 6.  Very low suspicion for ACS.  Lungs clear to auscultation.  No cough or shortness of breath. Not anticoagulated, hx of DVT in arm. CBC with leukocytosis however she has chronic leukocytosis and chart review shows that it has been elevated for several years.  Hemoglobin 10.9 which is near baseline.  CMP with alk phos elevated 187, this was elevated previously and had CT abdomen with and without contrast in December of this past year which did show cirrhotic changes.  Creatinine 1.63 which is near her baseline.  Normal sodium and potassium.  Urinalysis without evidence of acute infection, no CVA tenderness.  Lipase normal at 58.     0.3 mg IV hydromorphone for pain control.  On reevaluation was sleeping comfortably. Hx of GERD. 20 mg IV famotidine given. No melena or blood in stool reported.     Of note, my history is substantially different from initial nursing note. Low suspicion for VTE, D dimer is not elevated. Will obtain CT abdomen pelvis for further evaluation. Had contrasted CT scan in December for elevated alk phos. Which showed nodular contour of liver suggestive of cirrhosis and 4mm arterial enhancing focus in inferior right hepatic lobes with plan for follow up imaging in 6 months. 500 ml normal saline given due to her CKD and contrasted CT. She is on diuretic so I don't want to give too much fluid. No lower extremity edema on exam.       It is the end of my shift and care of patient signed out to Dr. Do for further cares while in the department.  Plan to follow up on CT abdomen/pelvis and plan for discharge if no acute findings.     PDMP Review         Value Time User    State PDMP site checked  Yes 2/3/2025 12:51 AM Guicho Coulter MD                    I have reviewed the nursing notes.         New Prescriptions    OXYCODONE (ROXICODONE) 5 MG TABLET    Take 1 tablet (5 mg) by mouth every 6 hours as needed for severe pain.       Final diagnoses:   Type 2 diabetes mellitus with stage 3b chronic kidney disease, with long-term current use of insulin (H)   Gastroesophageal reflux disease, unspecified whether esophagitis present   Chronic leukocytosis   Pain of upper abdomen     Guicho Coulter MD        2/2/2025   [facility] EMERGENCY DEPT    Disclaimer: This note consists of words and symbols derived from keyboarding and dictation using voice recognition software.  As a result, there may be errors that have gone undetected.  Please consider this when interpreting information found in this note.               Guicho Coulter MD  02/03/25 0110

## 2025-02-04 NOTE — TELEPHONE ENCOUNTER
"Called and spoke with pt. Read back  Routing message received from Dr. Dias:     \"Her ED visit was not specifically kidney related - recommend follow-up with PCP within a week to follow-up hospitalization. Recommend labs next Monday (can cancel the lab appt prior to our visit at the end of the month). All of those orders are in the chart dated for the 24th - I would guess they can pull them next MOnday as that would be 2 weeks prior?  Sánchez Dias, DO \"    Pt verbalized understanding and agreed with plan. Pt will contact her PCP to schedule.  Assisted with scheduling lab appt 2/10/25 ([facility] [place] location per pt request) and cancelled lab appt on 2/24/25.  Confirmed pts in clinic appt with Dr. Dias 2/241/25 at 3:30pm.    Pt had no further questions.    Dionne Fuller LPN    "

## 2025-02-09 ENCOUNTER — MYC MEDICAL ADVICE (OUTPATIENT)
Dept: FAMILY MEDICINE | Facility: CLINIC | Age: 40
End: 2025-02-09
Payer: COMMERCIAL

## 2025-02-10 ENCOUNTER — MYC MEDICAL ADVICE (OUTPATIENT)
Dept: FAMILY MEDICINE | Facility: CLINIC | Age: 40
End: 2025-02-10

## 2025-02-10 ENCOUNTER — LAB (OUTPATIENT)
Dept: LAB | Facility: CLINIC | Age: 40
End: 2025-02-10
Payer: COMMERCIAL

## 2025-02-10 DIAGNOSIS — D64.9 ANEMIA, UNSPECIFIED TYPE: ICD-10-CM

## 2025-02-10 DIAGNOSIS — N18.31 STAGE 3A CHRONIC KIDNEY DISEASE (H): ICD-10-CM

## 2025-02-10 DIAGNOSIS — N25.81 SECONDARY RENAL HYPERPARATHYROIDISM: ICD-10-CM

## 2025-02-10 DIAGNOSIS — N18.31 TYPE 2 DIABETES MELLITUS WITH STAGE 3A CHRONIC KIDNEY DISEASE, WITH LONG-TERM CURRENT USE OF INSULIN (H): ICD-10-CM

## 2025-02-10 DIAGNOSIS — E11.22 TYPE 2 DIABETES MELLITUS WITH STAGE 3A CHRONIC KIDNEY DISEASE, WITH LONG-TERM CURRENT USE OF INSULIN (H): ICD-10-CM

## 2025-02-10 DIAGNOSIS — Z79.4 TYPE 2 DIABETES MELLITUS WITH STAGE 3A CHRONIC KIDNEY DISEASE, WITH LONG-TERM CURRENT USE OF INSULIN (H): ICD-10-CM

## 2025-02-10 LAB
ALBUMIN SERPL BCG-MCNC: 3.7 G/DL (ref 3.5–5.2)
ANION GAP SERPL CALCULATED.3IONS-SCNC: 11 MMOL/L (ref 7–15)
BUN SERPL-MCNC: 32.8 MG/DL (ref 6–20)
CALCIUM SERPL-MCNC: 9.9 MG/DL (ref 8.8–10.4)
CHLORIDE SERPL-SCNC: 107 MMOL/L (ref 98–107)
CREAT SERPL-MCNC: 1.71 MG/DL (ref 0.51–0.95)
EGFRCR SERPLBLD CKD-EPI 2021: 39 ML/MIN/1.73M2
ERYTHROCYTE [DISTWIDTH] IN BLOOD BY AUTOMATED COUNT: 15.1 % (ref 10–15)
FERRITIN SERPL-MCNC: 25 NG/ML (ref 6–175)
GLUCOSE SERPL-MCNC: 204 MG/DL (ref 70–99)
HCO3 SERPL-SCNC: 25 MMOL/L (ref 22–29)
HCT VFR BLD AUTO: 36.9 % (ref 35–47)
HGB BLD-MCNC: 11 G/DL (ref 11.7–15.7)
IRON BINDING CAPACITY (ROCHE): 357 UG/DL (ref 240–430)
IRON SATN MFR SERPL: 19 % (ref 15–46)
IRON SERPL-MCNC: 67 UG/DL (ref 37–145)
MCH RBC QN AUTO: 29.3 PG (ref 26.5–33)
MCHC RBC AUTO-ENTMCNC: 29.8 G/DL (ref 31.5–36.5)
MCV RBC AUTO: 98 FL (ref 78–100)
PHOSPHATE SERPL-MCNC: 4.4 MG/DL (ref 2.5–4.5)
PLATELET # BLD AUTO: 405 10E3/UL (ref 150–450)
POTASSIUM SERPL-SCNC: 5.9 MMOL/L (ref 3.4–5.3)
PTH-INTACT SERPL-MCNC: 39 PG/ML (ref 15–65)
RBC # BLD AUTO: 3.76 10E6/UL (ref 3.8–5.2)
SODIUM SERPL-SCNC: 143 MMOL/L (ref 135–145)
WBC # BLD AUTO: 15.7 10E3/UL (ref 4–11)

## 2025-02-10 PROCEDURE — 83540 ASSAY OF IRON: CPT

## 2025-02-10 PROCEDURE — 83970 ASSAY OF PARATHORMONE: CPT

## 2025-02-10 PROCEDURE — 85027 COMPLETE CBC AUTOMATED: CPT

## 2025-02-10 PROCEDURE — 82728 ASSAY OF FERRITIN: CPT

## 2025-02-10 PROCEDURE — 36415 COLL VENOUS BLD VENIPUNCTURE: CPT

## 2025-02-10 PROCEDURE — 83550 IRON BINDING TEST: CPT

## 2025-02-10 PROCEDURE — 82306 VITAMIN D 25 HYDROXY: CPT

## 2025-02-10 PROCEDURE — 80069 RENAL FUNCTION PANEL: CPT

## 2025-02-10 NOTE — TELEPHONE ENCOUNTER
José Luist sent to patient and will await reply.    Mer Arthur RN  [facility]

## 2025-02-10 NOTE — TELEPHONE ENCOUNTER
Jaleesa Velasquez:    Patient requesting refill on insulin, only insulin is her Humalog, but her last A1C was 8.1 last on 10-29-24, ok to send refill? Patient my charts needs prescription changed so she does not run out.    Please advise, does patient need appointment or labs?      HALLIE Kong

## 2025-02-11 ENCOUNTER — TELEPHONE (OUTPATIENT)
Dept: FAMILY MEDICINE | Facility: CLINIC | Age: 40
End: 2025-02-11
Payer: COMMERCIAL

## 2025-02-11 DIAGNOSIS — G43.009 MIGRAINE WITHOUT AURA AND WITHOUT STATUS MIGRAINOSUS, NOT INTRACTABLE: ICD-10-CM

## 2025-02-11 LAB
DEPRECATED CALCIDIOL+CALCIFEROL SERPL-MC: <39 UG/L (ref 20–75)
VITAMIN D2 SERPL-MCNC: <5 UG/L
VITAMIN D3 SERPL-MCNC: 34 UG/L

## 2025-02-11 RX ORDER — INSULIN ASPART 100 [IU]/ML
INJECTION, SOLUTION INTRAVENOUS; SUBCUTANEOUS
Qty: 150 ML | Refills: 1 | Status: SHIPPED | OUTPATIENT
Start: 2025-02-11

## 2025-02-11 NOTE — TELEPHONE ENCOUNTER
"\"She gets auto refills of meds and I fill her meds every 2 weeks with a back up fill, but we are running into issues since we don't have pills since we don't have the proper quantity to     \"Can the doctor do a 60 day instead of 30 day fill?..\"    I did advise the Topiramate was ordered for a quantity of 90 with 3 refills on 5-9-24, so it may be an insurance issue if the pharmacy can't dispense #90 at a time?    She will call pharmacy to see if there is a quantity limit? Or did pharmacy not have enough med in stick to fill #90 at a time? Or?..    Gail Blair RN        "

## 2025-02-11 NOTE — TELEPHONE ENCOUNTER
"  Medication Question or Refill        What medication are you calling about (include dose and sig)?: topiramate    Preferred Pharmacy:       [facility] [address]  [address] [address] [address]  Phone: [phone] Fax: [phone]    Controlled Substance Agreement on file:   CSA -- Patient Level:    CSA: None found at the patient level.       Who prescribed the medication?: Jaleesa    Do you need a refill? No    Do you have any questions or concerns?  Yes: fax received from pharmacy with the following message \" Pts home health nurse states this Rx was increased to 50mgs daily. If that is correct, can you please issue an updated Rx with the current strength and directions. Thanks. Please call with questions\"          "

## 2025-02-11 NOTE — TELEPHONE ENCOUNTER
Please advised patient to recheck A1C, there is order from her endocrinologist.    VERONIQUE Spears CNP

## 2025-02-12 RX ORDER — TOPIRAMATE 50 MG/1
50 TABLET, FILM COATED ORAL DAILY
Qty: 90 TABLET | Refills: 1 | Status: SHIPPED | OUTPATIENT
Start: 2025-02-12

## 2025-02-12 NOTE — TELEPHONE ENCOUNTER
See other MyChart encounter where this is addressed by PCP.  Closing this encounter to avoid duplication.    Mer Arthur RN  [facility]

## 2025-02-12 NOTE — TELEPHONE ENCOUNTER
Covering for primary/ordering provider  It is unclear what the correct dose should be based on chart review. Hold for PCP

## 2025-02-14 ENCOUNTER — OFFICE VISIT (OUTPATIENT)
Dept: FAMILY MEDICINE | Facility: CLINIC | Age: 40
End: 2025-02-14
Payer: COMMERCIAL

## 2025-02-14 VITALS
TEMPERATURE: 98.6 F | OXYGEN SATURATION: 95 % | WEIGHT: 241.8 LBS | SYSTOLIC BLOOD PRESSURE: 132 MMHG | HEIGHT: 63 IN | RESPIRATION RATE: 16 BRPM | DIASTOLIC BLOOD PRESSURE: 68 MMHG | HEART RATE: 84 BPM | BODY MASS INDEX: 42.84 KG/M2

## 2025-02-14 DIAGNOSIS — G43.909 MIGRAINE WITHOUT STATUS MIGRAINOSUS, NOT INTRACTABLE, UNSPECIFIED MIGRAINE TYPE: Primary | ICD-10-CM

## 2025-02-14 PROCEDURE — 99213 OFFICE O/P EST LOW 20 MIN: CPT | Performed by: NURSE PRACTITIONER

## 2025-02-14 RX ORDER — AMITRIPTYLINE HYDROCHLORIDE 10 MG/1
10 TABLET ORAL AT BEDTIME
Qty: 30 TABLET | Refills: 0 | Status: SHIPPED | OUTPATIENT
Start: 2025-02-14

## 2025-02-14 NOTE — PATIENT INSTRUCTIONS
Try Elavil 10 mg at bedtime  Continue Topamax 50 mg daily  Continue Tylenol 500 mg 4 times daily as needed for headaches

## 2025-02-14 NOTE — PROGRESS NOTES
Assessment & Plan     Migraine without status migrainosus, not intractable, unspecified migraine type    - start amitriptyline (ELAVIL) 10 MG tablet; Take 1 tablet (10 mg) by mouth at bedtime.  -continue Topamax 50 mg daily  -follow up if no improvement       Subjective   Divina is a 39 year old, presenting for the following health issues: pounding bilateral headaches, no vision changes, denies nausea.   Headache      2/14/2025    10:41 AM   Additional Questions   Roomed by og   Accompanied by self         2/14/2025    10:41 AM   Patient Reported Additional Medications   Patient reports taking the following new medications none     HPI     Migraine   Since your last clinic visit, how have your headaches changed?  Worsened  How often are you getting headaches or migraines? Every day   Are you able to do normal daily activities when you have a migraine? No  Are you taking rescue/relief medications? (Select all that apply) Tylenol  How helpful is your rescue/relief medication?  I get no relief  Are you taking any medications to prevent migraines? (Select all that apply)  Topamax  In the past 4 weeks, how often have you gone to urgent care or the emergency room because of your headaches?  0      Review of Systems  Constitutional, HEENT, cardiovascular, pulmonary, gi and gu systems are negative, except as otherwise noted.      Objective    There were no vitals taken for this visit.  There is no height or weight on file to calculate BMI.  Physical Exam   GENERAL: alert and no distress  EYES: Eyes grossly normal to inspection, PERRL and conjunctivae and sclerae normal  HENT: ear canals and TM's normal, nose and mouth without ulcers or lesions  NECK: no adenopathy, no asymmetry, masses, or scars  CV: regular rates and rhythm, normal S1 S2, no S3 or S4, grade 3/6 systolic murmur heard best over the apex, and mild lower extremity pitting edema   SKIN: no suspicious lesions or rashes  NEURO: Normal strength and tone,  mentation intact and speech normal  PSYCH: mentation appears normal, affect normal/bright            Signed Electronically by: VERONIQUE Spears CNP

## 2025-02-17 DIAGNOSIS — R60.0 LOWER EXTREMITY EDEMA: ICD-10-CM

## 2025-02-17 RX ORDER — BUMETANIDE 0.5 MG/1
0.5 TABLET ORAL DAILY
Qty: 30 TABLET | Refills: 2 | Status: SHIPPED | OUTPATIENT
Start: 2025-02-17

## 2025-02-17 NOTE — TELEPHONE ENCOUNTER
Nephrology Note: Medication Refill Request    Medication Refill Request:     Medication/Dose/Frequency: Bumetanide  Preferred Pharmacy: [facility] - MATIAS Mcnamara -   Provider:  Dr Dias                         SITUATION/BACKROUND:                 Last office visit: 12/11/23        Future office visit: 2/24/25     ASSESSMENT:     Neph Assessments:    Recent Labs:  CBC Results:  Recent Labs   Lab Test 02/10/25  1322   WBC 15.7*   RBC 3.76*   HGB 11.0*   HCT 36.9   MCV 98   MCH 29.3   MCHC 29.8*   RDW 15.1*        Last Renal Panel:  Sodium   Date Value Ref Range Status   02/10/2025 143 135 - 145 mmol/L Final   07/08/2021 139 133 - 144 mmol/L Final     Potassium   Date Value Ref Range Status   02/10/2025 5.9 (H) 3.4 - 5.3 mmol/L Final   12/19/2022 5.1 3.4 - 5.3 mmol/L Final   07/08/2021 4.4 3.4 - 5.3 mmol/L Final     Chloride   Date Value Ref Range Status   02/10/2025 107 98 - 107 mmol/L Final   12/19/2022 112 (H) 94 - 109 mmol/L Final   07/08/2021 108 94 - 109 mmol/L Final     Carbon Dioxide   Date Value Ref Range Status   07/08/2021 26 20 - 32 mmol/L Final     Carbon Dioxide (CO2)   Date Value Ref Range Status   02/10/2025 25 22 - 29 mmol/L Final   12/19/2022 26 20 - 32 mmol/L Final     Anion Gap   Date Value Ref Range Status   02/10/2025 11 7 - 15 mmol/L Final   12/19/2022 3 3 - 14 mmol/L Final   07/08/2021 5 3 - 14 mmol/L Final     Glucose   Date Value Ref Range Status   02/10/2025 204 (H) 70 - 99 mg/dL Final   12/19/2022 258 (H) 70 - 99 mg/dL Final   07/08/2021 187 (H) 70 - 99 mg/dL Final     GLUCOSE BY METER POCT   Date Value Ref Range Status   09/18/2023 121 (H) 70 - 99 mg/dL Final     Urea Nitrogen   Date Value Ref Range Status   02/10/2025 32.8 (H) 6.0 - 20.0 mg/dL Final   12/19/2022 19 7 - 30 mg/dL Final   07/08/2021 22 7 - 30 mg/dL Final     Creatinine   Date Value Ref Range Status   02/10/2025 1.71 (H) 0.51 - 0.95 mg/dL Final   07/08/2021 1.32 (H) 0.52 - 1.04 mg/dL Final      GFR Estimate   Date Value Ref Range Status   02/10/2025 39 (L) >60 mL/min/1.73m2 Final     Comment:     eGFR calculated using 2021 CKD-EPI equation.   07/08/2021 52 (L) >60 mL/min/[1.73_m2] Final     Comment:     Non  GFR Calc  Starting 12/18/2018, serum creatinine based estimated GFR (eGFR) will be   calculated using the Chronic Kidney Disease Epidemiology Collaboration   (CKD-EPI) equation.       GFR, ESTIMATED POCT   Date Value Ref Range Status   12/23/2024 30 (L) >60 mL/min/1.73m2 Final     Calcium   Date Value Ref Range Status   02/10/2025 9.9 8.8 - 10.4 mg/dL Final   07/08/2021 9.2 8.5 - 10.1 mg/dL Final     Phosphorus   Date Value Ref Range Status   02/10/2025 4.4 2.5 - 4.5 mg/dL Final   06/15/2021 3.2 2.5 - 4.5 mg/dL Final     Albumin   Date Value Ref Range Status   02/10/2025 3.7 3.5 - 5.2 g/dL Final   12/19/2022 3.0 (L) 3.4 - 5.0 g/dL Final   07/08/2021 3.4 3.4 - 5.0 g/dL Final       Current Medication List:   Current Outpatient Medications (Antihypertensive, Cardiovascular, Diuretics, Beta blockers, Calcium blockers, Anticoagulants)   Medication Sig    bumetanide (BUMEX) 0.5 MG tablet Take 1 tablet (0.5 mg) by mouth daily    carvedilol (COREG) 12.5 MG tablet Take 1 tablet (12.5 mg) by mouth 2 times daily    lisinopril (ZESTRIL) 2.5 MG tablet Take 1 tablet (2.5 mg) by mouth daily     Current Outpatient Medications (Other)   Medication Sig    ACCU-CHEK GUIDE test strip Use to test blood sugar 3 times daily or as directed.    acetaminophen (TYLENOL) 500 MG tablet Take 1-2 tablets (500-1,000 mg) by mouth every 6 hours as needed for mild pain.    acetaminophen (TYLENOL) 500 MG tablet Take 1-2 tablets (500-1,000 mg) by mouth every 8 hours as needed for mild pain    albuterol (VENTOLIN HFA) 108 (90 Base) MCG/ACT inhaler INHALE 2 PUFFS INTO THE LUNGS EVERY SIX  HOURS AS NEEDED FOR SHORTNESS OF BREATH    alendronate (FOSAMAX) 70 MG tablet Take 1 tablet (70 mg) by mouth every 7 days     "amitriptyline (ELAVIL) 10 MG tablet Take 1 tablet (10 mg) by mouth at bedtime.    ARIPiprazole (ABILIFY) 15 MG tablet Take 1 Tablet (15 mg) by mouth once daily in the evening.    ARIPiprazole (ABILIFY) 30 MG tablet Take 30 mg by mouth daily    atorvastatin (LIPITOR) 20 MG tablet Take 1 tablet (20 mg) by mouth daily    Biotin 5 MG TABS Take 1 tablet by mouth daily    blood glucose (ACCU-CHEK GUIDE) test strip Use to test blood sugar 3 times daily or as directed.    cloZAPine (CLOZARIL) 50 MG tablet Take 150 mg by mouth at bedtime    diclofenac (VOLTAREN) 1 % topical gel Apply 2 g topically 4 times daily    docusate sodium (COLACE) 100 MG capsule Take 1 capsule (100 mg) by mouth 2 times daily as needed for constipation.    FLUoxetine (PROZAC) 40 MG capsule Take 40 mg by mouth daily    glucose (BD GLUCOSE) 4 g chewable tablet Take 1 tablet by mouth every hour as needed for low blood sugar    hydrOXYzine HCl (ATARAX) 25 MG tablet Take 25 mg by mouth 3 times daily as needed for anxiety    insulin aspart (NOVOLOG VIAL) 100 UNITS/ML vial for use with continuous insulin pump  Max TDD 80    Insulin Infusion Pump (MINIMED 780G INSULIN PUMP) KIT 1 each daily SmartGuard Target: 100; Active Insulin Time: 2 hours (recommended); SmartGuardTM Warmup/Manual Mode: Suspend before low (recommended)    Insulin Infusion Pump Supplies (EXTENDED INFUSION SET 23\"/6MM) MISC 1 each every 7 days Max Total Daily Dose 70 units; Change infusion set & reservoir every 7 days (recommended)    Insulin Infusion Pump Supplies (EXTENDED RESERVOIR 3ML) MISC 1 each every 7 days    lamoTRIgine (LAMICTAL) 100 MG tablet Take 100 mg by mouth At Bedtime    loratadine (CLARITIN) 10 MG tablet Take 1 tablet (10 mg) by mouth every morning    meclizine (ANTIVERT) 25 MG tablet Take 1 tablet (25 mg) by mouth 3 times daily as needed for dizziness    nicotine (NICODERM CQ) 14 MG/24HR 24 hr patch Place 1 patch over 24 hours onto the skin every 24 hours.    nicotine " (NICODERM CQ) 21 MG/24HR 24 hr patch Place 1 patch over 24 hours onto the skin every 24 hours. 21 mg for about 2 weeks, then decrease to 14 mg for about 7 days and then decrease to 7 mg for up to 7 days and stop    nicotine (NICODERM CQ) 7 MG/24HR 24 hr patch Place 1 patch over 24 hours onto the skin every 24 hours. (Patient not taking: Reported on 2/14/2025)    nitroGLYcerin 0.2% ointment Apply 1 click (0.25 g) topically every 24 hours for 14 days    omeprazole (PRILOSEC) 20 MG DR capsule TAKE 1 CAPSULE BY MOUTH EVERY DAY    Suvorexant (BELSOMRA) 10 MG tablet Take 10 mg by mouth at bedtime    topiramate (TOPAMAX) 50 MG tablet Take 1 tablet (50 mg) by mouth daily.       Has patient had kidney transplant in the prior 1 year: no.   Has patient been seen the last 12 months: No, upcoming appointment 2/24.  Associated labs reviewed for medication: Yes    PLAN:     Medication refilled per protocol: Yes    SCOTT CHILDS RN

## 2025-02-19 ENCOUNTER — OFFICE VISIT (OUTPATIENT)
Dept: PODIATRY | Facility: CLINIC | Age: 40
End: 2025-02-19
Payer: COMMERCIAL

## 2025-02-19 ENCOUNTER — HOSPITAL ENCOUNTER (OUTPATIENT)
Dept: CT IMAGING | Facility: CLINIC | Age: 40
Discharge: HOME | End: 2025-02-19
Attending: PODIATRIST
Payer: COMMERCIAL

## 2025-02-19 DIAGNOSIS — M19.072 OSTEOARTHRITIS OF MIDTARSAL JOINT OF LEFT FOOT: ICD-10-CM

## 2025-02-19 DIAGNOSIS — E11.43 TYPE II DIABETES MELLITUS WITH PERIPHERAL AUTONOMIC NEUROPATHY (H): ICD-10-CM

## 2025-02-19 DIAGNOSIS — S92.022K CLOSED DISPLACED FRACTURE OF ANTERIOR PROCESS OF LEFT CALCANEUS WITH NONUNION, SUBSEQUENT ENCOUNTER: Primary | ICD-10-CM

## 2025-02-19 DIAGNOSIS — M25.572 SINUS TARSI SYNDROME, LEFT: ICD-10-CM

## 2025-02-19 PROCEDURE — 73700 CT LOWER EXTREMITY W/O DYE: CPT | Mod: LT

## 2025-02-19 ASSESSMENT — PAIN SCALES - GENERAL: PAINLEVEL_OUTOF10: SEVERE PAIN (8)

## 2025-02-19 NOTE — PROGRESS NOTES
Divina returns to the office for reevaluation of the {RIGHT /LEFT:018258} foot.  The patient relates following the instructions given at the last visit with noted overall {LESS/MORE:261601} pain and {LESS/MORE:457430} improvement in function of the {RIGHT /LEFT:175708} foot.   The patient relates no other problems.    Vitals: There were no vitals taken for this visit.  BMI= There is no height or weight on file to calculate BMI.    Lower Extremity Physical Exam:      Neurovascular status remains unchanged.  Muscular exam is within normal limits to major muscle groups.  Integument is intact.      Noted ***    Diagnostics:  Radiograph evaluation including three views of the {RIGHT /LEFT:276151} foot reveals interval healing with increased trabeculation of the ***.  I personally evaluated the images as well as reviewed the images with the patient pointing out the findings.      Assessment: No diagnosis found.    Plan:    I have explained to Divina about the progress of the conditions.  At this time, ***    Divina verbalized agreement with and understanding of the rational for the diagnosis and treatment plan.  All questions were answered to best of my ability and the patient's satisfaction. The patient was advised to contact the clinic with any questions that may arise after the clinic visit.      Disclaimer: This note consists of symbols derived from keyboarding, dictation and/or voice recognition software. As a result, there may be errors in the script that have gone undetected. Please consider this when interpreting information found in this chart.       ERNA Douglas D.P.M., F.ANGELES.CARYN.F.A.S.     Osteonecrosis along the talar dome without overlying articular surface collapse or fragmentation. Additional small focus of osteonecrosis in the talar head.  3.  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  4.  No evidence of an acute fracture.  Sobeiad Bo MD     Assessment:     ICD-10-CM    1. 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 Type II diabetes mellitus with peripheral autonomic neuropathy (H)  E11.43 US,BONE,STIMULATION          Plan:    I have explained to Divina about the progress of the conditions.  At this time, the patient will continue to offload the left foot/ankle using her CAM boot.  The patient was prescribed an Exogen ultrasonic bone stimulator to help heal the nonunion fracture of the anterior process of the left calcaneus.  The patient will return in six weeks for reevaluation and repeat x-rays.    Divina verbalized agreement with and understanding of the rational for the diagnosis and treatment plan.  All questions were answered to best of my ability and the patient's satisfaction. The patient was advised to contact the clinic with any questions that may arise after the clinic visit.      Disclaimer: This note consists of symbols derived from keyboarding, dictation and/or voice recognition software. As a result, there may be errors in the script that have gone undetected. Please consider this when interpreting information found in this chart.       ERNA Douglas D.P.M., FALIYAH.F.A.S.

## 2025-02-19 NOTE — PATIENT INSTRUCTIONS
Next Steps:    WIll order a bone stimulator to apply over the sinus tarsi of the left foot.  Return in one month for reevaluation and repeat x-rays.

## 2025-02-19 NOTE — NURSING NOTE
"Chief Complaint   Patient presents with    RECHECK     CT of foot and ankle- discuss- discoloration is on the foot       Initial There were no vitals taken for this visit. Estimated body mass index is 42.83 kg/m  as calculated from the following:    Height as of 2/14/25: 1.6 m (5' 3\").    Weight as of 2/14/25: 109.7 kg (241 lb 12.8 oz).  Medications and allergies reviewed.      Pamela COOK MA    "

## 2025-02-19 NOTE — LETTER
extending along the dorsum of the foot. No sizable joint effusion.                                                                      IMPRESSION:  1.  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  2.  Osteonecrosis along the talar dome without overlying articular surface collapse or fragmentation. Additional small focus of osteonecrosis in the talar head.  3.  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  4.  No evidence of an acute fracture.  Sobeida Bo MD     Assessment:     ICD-10-CM    1. 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 Type II diabetes mellitus with peripheral autonomic neuropathy (H)  E11.43 US,BONE,STIMULATION          Plan:    I have explained to Divina about the progress of the conditions.  At this time, the patient will continue to offload the left foot/ankle using her CAM boot.  The patient was prescribed an Exogen ultrasonic bone stimulator to help heal the nonunion fracture of the anterior process of the left calcaneus.  The patient will return in six weeks for reevaluation and repeat x-rays.    Divina verbalized agreement with and understanding of the rational for the diagnosis and treatment plan.  All questions were answered to best of my ability and the patient's satisfaction. The patient was advised to contact the clinic with any questions that may arise after the clinic visit.      Disclaimer: This note consists of symbols derived from keyboarding, dictation and/or voice recognition software. As a result, there may be errors in the script that have gone undetected. Please consider this when interpreting information found in this chart.       JOYCE Hinson.P.MACI., F.A.C.F.A.S.    Again, thank you for allowing me to participate in the care of your patient.        Sincerely,        Raul  Oli Douglas DPM    Electronically signed

## 2025-02-19 NOTE — Clinical Note
2/19/2025      Divina Delgadillo  [address]  [place] [address]      Dear Colleague,    Thank you for referring your patient, Divina Delgadillo, to the [facility]. Please see a copy of my visit note below.    No notes on file    Again, thank you for allowing me to participate in the care of your patient.        Sincerely,        Raul Douglas DPM    Electronically signed

## 2025-02-23 ENCOUNTER — HEALTH MAINTENANCE LETTER (OUTPATIENT)
Age: 40
End: 2025-02-23

## 2025-02-23 ENCOUNTER — APPOINTMENT (OUTPATIENT)
Dept: GENERAL RADIOLOGY | Facility: CLINIC | Age: 40
End: 2025-02-23
Attending: EMERGENCY MEDICINE
Payer: COMMERCIAL

## 2025-02-23 ENCOUNTER — HOSPITAL ENCOUNTER (EMERGENCY)
Facility: CLINIC | Age: 40
Discharge: HOME | End: 2025-02-23
Attending: EMERGENCY MEDICINE | Admitting: EMERGENCY MEDICINE
Payer: COMMERCIAL

## 2025-02-23 VITALS
OXYGEN SATURATION: 92 % | RESPIRATION RATE: 16 BRPM | HEIGHT: 63 IN | BODY MASS INDEX: 42.52 KG/M2 | SYSTOLIC BLOOD PRESSURE: 138 MMHG | TEMPERATURE: 98.4 F | DIASTOLIC BLOOD PRESSURE: 73 MMHG | HEART RATE: 81 BPM | WEIGHT: 240 LBS

## 2025-02-23 DIAGNOSIS — S92.021A DISPLACED FRACTURE OF ANTERIOR PROCESS OF RIGHT CALCANEUS, INITIAL ENCOUNTER FOR CLOSED FRACTURE: ICD-10-CM

## 2025-02-23 DIAGNOSIS — S92.251A CLOSED DISPLACED FRACTURE OF NAVICULAR BONE OF RIGHT FOOT, INITIAL ENCOUNTER: ICD-10-CM

## 2025-02-23 PROCEDURE — 73630 X-RAY EXAM OF FOOT: CPT | Mod: RT

## 2025-02-23 PROCEDURE — 99283 EMERGENCY DEPT VISIT LOW MDM: CPT | Mod: 57 | Performed by: EMERGENCY MEDICINE

## 2025-02-23 PROCEDURE — 28400 CLTX CALCANEAL FX W/O MNPJ: CPT | Mod: RT | Performed by: EMERGENCY MEDICINE

## 2025-02-23 PROCEDURE — 28400 CLTX CALCANEAL FX W/O MNPJ: CPT | Mod: 54 | Performed by: EMERGENCY MEDICINE

## 2025-02-23 PROCEDURE — 250N000013 HC RX MED GY IP 250 OP 250 PS 637: Performed by: EMERGENCY MEDICINE

## 2025-02-23 PROCEDURE — 73610 X-RAY EXAM OF ANKLE: CPT | Mod: RT

## 2025-02-23 PROCEDURE — 99284 EMERGENCY DEPT VISIT MOD MDM: CPT | Mod: 25 | Performed by: EMERGENCY MEDICINE

## 2025-02-23 RX ORDER — OXYCODONE HYDROCHLORIDE 5 MG/1
5 TABLET ORAL ONCE
Status: DISCONTINUED | OUTPATIENT
Start: 2025-02-23 | End: 2025-02-23

## 2025-02-23 RX ORDER — OXYCODONE HYDROCHLORIDE 5 MG/1
5 TABLET ORAL EVERY 6 HOURS PRN
Qty: 12 TABLET | Refills: 0 | Status: SHIPPED | OUTPATIENT
Start: 2025-02-23

## 2025-02-23 RX ORDER — OXYCODONE HYDROCHLORIDE 5 MG/1
10 TABLET ORAL ONCE
Status: COMPLETED | OUTPATIENT
Start: 2025-02-23 | End: 2025-02-23

## 2025-02-23 RX ORDER — ACETAMINOPHEN 325 MG/1
975 TABLET ORAL ONCE
Status: COMPLETED | OUTPATIENT
Start: 2025-02-23 | End: 2025-02-23

## 2025-02-23 RX ADMIN — OXYCODONE 10 MG: 5 TABLET ORAL at 19:56

## 2025-02-23 RX ADMIN — ACETAMINOPHEN 975 MG: 325 TABLET, FILM COATED ORAL at 18:56

## 2025-02-23 ASSESSMENT — ACTIVITIES OF DAILY LIVING (ADL)
ADLS_ACUITY_SCORE: 42
ADLS_ACUITY_SCORE: 42

## 2025-02-23 NOTE — ED TRIAGE NOTES
Right foot pain; pt already wearing a brace on left foot due for surgery on left foot in a few months. Today pt injured right foot while helping a friend move     Triage Assessment (Adult)       Row Name 02/23/25 [id number]          Triage Assessment    Airway WDL WDL        Cardiac WDL    Cardiac WDL WDL        Cognitive/Neuro/Behavioral WDL    Cognitive/Neuro/Behavioral WDL WDL

## 2025-02-24 ENCOUNTER — OFFICE VISIT (OUTPATIENT)
Dept: NEPHROLOGY | Facility: CLINIC | Age: 40
End: 2025-02-24
Payer: COMMERCIAL

## 2025-02-24 VITALS — HEART RATE: 87 BPM | SYSTOLIC BLOOD PRESSURE: 116 MMHG | DIASTOLIC BLOOD PRESSURE: 69 MMHG | OXYGEN SATURATION: 91 %

## 2025-02-24 DIAGNOSIS — E11.65 UNCONTROLLED TYPE 2 DIABETES MELLITUS WITH HYPERGLYCEMIA (H): ICD-10-CM

## 2025-02-24 DIAGNOSIS — E87.5 HYPERKALEMIA: Primary | ICD-10-CM

## 2025-02-24 DIAGNOSIS — N25.81 SECONDARY RENAL HYPERPARATHYROIDISM: ICD-10-CM

## 2025-02-24 DIAGNOSIS — N18.32 CKD STAGE 3B, GFR 30-44 ML/MIN (H): ICD-10-CM

## 2025-02-24 DIAGNOSIS — D64.9 ANEMIA, UNSPECIFIED TYPE: ICD-10-CM

## 2025-02-24 DIAGNOSIS — I10 ESSENTIAL HYPERTENSION: ICD-10-CM

## 2025-02-24 DIAGNOSIS — D72.829 LEUKOCYTOSIS, UNSPECIFIED TYPE: ICD-10-CM

## 2025-02-24 LAB
ANION GAP SERPL CALCULATED.3IONS-SCNC: 10 MMOL/L (ref 7–15)
BUN SERPL-MCNC: 36.3 MG/DL (ref 6–20)
CALCIUM SERPL-MCNC: 9 MG/DL (ref 8.8–10.4)
CHLORIDE SERPL-SCNC: 107 MMOL/L (ref 98–107)
CREAT SERPL-MCNC: 1.95 MG/DL (ref 0.51–0.95)
EGFRCR SERPLBLD CKD-EPI 2021: 33 ML/MIN/1.73M2
GLUCOSE SERPL-MCNC: 252 MG/DL (ref 70–99)
HCO3 SERPL-SCNC: 25 MMOL/L (ref 22–29)
POTASSIUM SERPL-SCNC: 4.7 MMOL/L (ref 3.4–5.3)
SODIUM SERPL-SCNC: 142 MMOL/L (ref 135–145)

## 2025-02-24 PROCEDURE — 36415 COLL VENOUS BLD VENIPUNCTURE: CPT | Performed by: INTERNAL MEDICINE

## 2025-02-24 PROCEDURE — 99214 OFFICE O/P EST MOD 30 MIN: CPT | Performed by: INTERNAL MEDICINE

## 2025-02-24 PROCEDURE — 80048 BASIC METABOLIC PNL TOTAL CA: CPT | Performed by: INTERNAL MEDICINE

## 2025-02-24 PROCEDURE — 3074F SYST BP LT 130 MM HG: CPT | Performed by: INTERNAL MEDICINE

## 2025-02-24 PROCEDURE — 3078F DIAST BP <80 MM HG: CPT | Performed by: INTERNAL MEDICINE

## 2025-02-24 NOTE — ED PROVIDER NOTES
History     Chief Complaint   Patient presents with    Foot Pain     Right foot pain; pt already wearing a brace on left foot due for surgery on left foot in a few months. Today pt injured right foot while helping a friend move     HPI  Divina Delgadillo is a 39 year old female who presents emergency department for evaluation of right foot and right ankle injury which occurred when she injured her ankle walking up stairs carrying a box helping a friend move late yesterday afternoon, approximately 24 hours ago.  Unclear mechanism of injury, ? twisting mechanism of injury.  She has pain over the top of the mid foot and lateral aspect of the midfoot.  She has swelling but no bruising discoloration.  Distal neurovascular/CMS function intact.  No other injury or trauma.  She has chronic left foot pain with diagnoses as documented on CT imaging below, is followed by Dr. Douglas of Podiatry, has been wearing a left cam walker boot splint as scheduled for left foot surgery.    Previous Records Reviewed:  CT ANKLE LEFT WITHOUT CONTRAST - [facility]  DATE: 02/19/2025     INDICATION: Sinus tarsi syndrome, left. 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  COMPARISON: Left ankle radiographs 01/10/2025.                                                         IMPRESSION:  1.  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  2.  Osteonecrosis along the talar dome without overlying articular surface collapse or fragmentation. Additional small focus of osteonecrosis in the talar head.  3.  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  4.  No evidence of an acute fracture.       Allergies:  No Known Allergies    Problem List:    Patient Active Problem List    Diagnosis Date Noted    Secondary renal hyperparathyroidism 10/30/2024     Priority: Medium    Peroneal tendinitis of left lower extremity 06/14/2024      Priority: Medium    Acute left ankle pain 06/14/2024     Priority: Medium    Avascular necrosis of bone of ankle (H) 06/14/2024     Priority: Medium    Other fracture of left femur, initial encounter for closed fracture (H) 01/21/2024     Priority: Medium    Chiari malformation type I (H) 06/12/2023     Priority: Medium    Ulnar neuropathy of both upper extremities 09/20/2022     Priority: Medium    Insomnia, unspecified type 08/30/2021     Priority: Medium    History of DVT of arm  (deep vein thrombosis) 01/23/2021     Priority: Medium     ultrasound 1/6/2017-  venous thrombus in the antecubital fossa region and the left forearm as described      Schizoaffective disorder, depressive type (H) 07/19/2019     Priority: Medium    Acquired asplenia 03/22/2019     Priority: Medium    Chronic leukocytosis 11/27/2018     Priority: Medium     Due to spleen removal      Nicotine abuse 08/13/2018     Priority: Medium    Mild intermittent asthma with acute exacerbation 01/16/2018     Priority: Medium    Morbid obesity (H) 01/09/2018     Priority: Medium    IUD (intrauterine device) in place 07/26/2017     Priority: Medium     Mirena IUD inserted in office with premed 7/26/2017        Cervical high risk HPV (human papillomavirus) test positive 06/20/2017     Priority: Medium     6/15/17 NIL, +HR HPV (not 16/18). Plan cotest in 1 year  6/14/18 NIL pap, neg HPV. Plan cotest in 3 years  3/26/21 NIL pap, Neg HPV. Plan cotest in 3 years.   4/24/24 NIL pap, +HR HPV (not 16/18). 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, splenectomy, left adrenalectomy 02/26/2015;      Type 2 diabetes mellitus with stage 3 chronic kidney disease, with long-term current use of insulin (H) 02/27/2017     Priority: Medium    Bipolar disorder (H) 02/27/2017     Priority:  Medium    Orthostatic hypotension 01/10/2017     Priority: Medium    Tobacco abuse 01/10/2017     Priority: Medium    Long term (current) use of anticoagulants 01/09/2017     Priority: Medium    Deep venous thrombosis of arm (H) 01/09/2017     Priority: Medium    Stage 3 chronic kidney disease 12/03/2015     Priority: Medium     Overview:   Updated per 10/1/17 IMO import      Neutrophilic leukocytosis 08/28/2015     Priority: Medium    Vitamin D insufficiency 06/01/2015     Priority: Medium    Cardiac murmur 08/20/2013     Priority: Medium    Depressive disorder 09/15/2012     Priority: Medium    Hyperlipidemia LDL goal <100 10/31/2010     Priority: Medium    Borderline personality disorder (H) 11/06/2009     Priority: Medium    Essential hypertension 06/13/2008     Priority: Medium    NAFL (nonalcoholic fatty liver) 04/11/2006     Priority: Medium     See flowsheet for hepatic panel.   Stopped zocor, was on godon as well had right upper quandrant ultrasound and ct  ? Psych med related vs [facility]NI      Esophageal reflux 04/14/2005     Priority: Medium     GERD      PTSD (post-traumatic stress disorder) 01/01/2001     Priority: Medium        Past Medical History:    Past Medical History:   Diagnosis Date    Acquired asplenia     Acute pain of left knee 03/22/2019    Acute-on-chronic kidney injury 03/22/2019    ARF (acute renal failure) 03/23/2019    Asthma     Bipolar disorder (H)     Cardiac murmur     Cervical high risk HPV (human papillomavirus) test positive 06/20/2017    Chiari malformation type I (H)     Chronic bilateral low back pain without sciatica     Deep venous thrombosis of arm (H) 01/06/2017    Depressive disorder     DVT (deep venous thrombosis) (H)     DVT of upper extremity (deep vein thrombosis) (H) 2021    Esophageal reflux     Hypertension     Insomnia     Leukocytosis     Mild intermittent asthma     Morbid obesity (H)     Mucinous neoplasm of pancreas     NAFL (nonalcoholic fatty liver)     Neck  pain     Nicotine abuse     Other specified types of schizophrenia, unspecified condition     Schizoaffective disorder, depressive type (H)     Stage 3a chronic kidney disease (CKD) (H)     Type 2 diabetes mellitus (H)     Ulnar neuropathy of both upper extremities     Vitamin D insufficiency        Past Surgical History:    Past Surgical History:   Procedure Laterality Date    ADRENALECTOMY Left 2015    BIOPSY      BREAST SURGERY  2013    COLONOSCOPY      ENT SURGERY  10/01/2000    Tympanoplasty    IR LIVER BIOPSY PERCUTANEOUS  2019    PANCREATECTOMY PARTIAL  2015    PERCUTANEOUS BIOPSY LIVER Right 2019    Procedure: Percutaneous Liver Biopsy;  Surgeon: Sean Guzman PA-C;  Location: [facility] OR    SPLENECTOMY      TONSILLECTOMY  10/01/2000    Tonsillectomy       Family History:    Family History   Problem Relation Age of Onset    Respiratory Mother         ASTHMA    Allergies Mother     Depression Mother     Chronic Obstructive Pulmonary Disease Mother     Anxiety Disorder Mother     Mental Illness Mother     Asthma Mother     Heart Disease Father         HEART VALVE REPLACEMENT    Hypertension Father     Diabetes Father     Depression Father     Anxiety Disorder Father     Mental Illness Father     Obesity Father     Respiratory Sister     Allergies Sister     Depression Sister     Respiratory Sister     Allergies Sister     Depression Sister     Respiratory Brother     Allergies Brother     Sleep Apnea Maternal Grandfather     Kidney Disease No family hx of        Social History:  Marital Status:  Single [1]  Social History     Tobacco Use    Smoking status: Every Day     Current packs/day: 0.00     Average packs/day: 0.5 packs/day for 0.2 years (0.1 ttl pk-yrs)     Types: Cigarettes, Vaping Device     Start date: 2023     Last attempt to quit: 2024     Years since quittin.0     Passive exposure: Yes    Smokeless tobacco: Never    Tobacco comments:     E- cig   Vaping Use     "Vaping status: Every Day    Substances: Nicotine, Flavoring    Devices: Disposable   Substance Use Topics    Alcohol use: No     Comment: sober since 2022    Drug use: No        Medications:    oxyCODONE (ROXICODONE) 5 MG tablet  ACCU-CHEK GUIDE test strip  acetaminophen (TYLENOL) 500 MG tablet  acetaminophen (TYLENOL) 500 MG tablet  albuterol (VENTOLIN HFA) 108 (90 Base) MCG/ACT inhaler  alendronate (FOSAMAX) 70 MG tablet  amitriptyline (ELAVIL) 10 MG tablet  ARIPiprazole (ABILIFY) 15 MG tablet  ARIPiprazole (ABILIFY) 30 MG tablet  atorvastatin (LIPITOR) 20 MG tablet  Biotin 5 MG TABS  blood glucose (ACCU-CHEK GUIDE) test strip  bumetanide (BUMEX) 0.5 MG tablet  carvedilol (COREG) 12.5 MG tablet  cloZAPine (CLOZARIL) 50 MG tablet  diclofenac (VOLTAREN) 1 % topical gel  docusate sodium (COLACE) 100 MG capsule  FLUoxetine (PROZAC) 40 MG capsule  glucose (BD GLUCOSE) 4 g chewable tablet  hydrOXYzine HCl (ATARAX) 25 MG tablet  insulin aspart (NOVOLOG VIAL) 100 UNITS/ML vial  Insulin Infusion Pump (MINIMED 780G INSULIN PUMP) KIT  Insulin Infusion Pump Supplies (EXTENDED INFUSION SET 23\"/6MM) MISC  Insulin Infusion Pump Supplies (EXTENDED RESERVOIR 3ML) MISC  lamoTRIgine (LAMICTAL) 100 MG tablet  loratadine (CLARITIN) 10 MG tablet  meclizine (ANTIVERT) 25 MG tablet  nicotine (NICODERM CQ) 14 MG/24HR 24 hr patch  nicotine (NICODERM CQ) 21 MG/24HR 24 hr patch  nicotine (NICODERM CQ) 7 MG/24HR 24 hr patch  nitroGLYcerin 0.2% ointment  omeprazole (PRILOSEC) 20 MG DR capsule  Suvorexant (BELSOMRA) 10 MG tablet  topiramate (TOPAMAX) 50 MG tablet        Review of Systems  As mentioned in the HPI, in addition focused review of systems was negative.    Physical Exam   BP: 138/73  Pulse: 81  Temp: 98.4  F (36.9  C)  Resp: 16  Height: 160 cm (5' 3\")  Weight: 108.9 kg (240 lb)  SpO2: 92 %      Physical Exam  Vitals and nursing note reviewed.   Constitutional:       General: She is not in acute distress.     Appearance: Normal " appearance.   Cardiovascular:      Rate and Rhythm: Normal rate and regular rhythm.      Pulses: Normal pulses.   Musculoskeletal:         General: Swelling and tenderness present. No deformity.      Right lower leg: No edema.      Left lower leg: No edema.        Feet:       Comments: Poorly localized right anterior ankle and midfoot tenderness with swelling but no ecchymosis, deformity or crepitance.  No ankle ligamentous laxity.  Ankle and foot neurovascular function intact.   Skin:     General: Skin is warm and dry.      Capillary Refill: Capillary refill takes less than 2 seconds.      Coloration: Skin is not pale.      Findings: No bruising, erythema, lesion or rash.   Neurological:      Mental Status: She is alert.      Sensory: No sensory deficit.      Motor: No weakness.   Psychiatric:         Mood and Affect: Mood normal.         Behavior: Behavior normal.         ED Course        Procedures       Results for orders placed or performed during the hospital encounter of 02/23/25   Foot  XR, G/E 3 views, right     Status: None    Narrative    EXAM: XR FOOT RIGHT G/E 3 VIEWS, XR ANKLE RIGHT G/E 3 VIEWS  LOCATION: [facility]  DATE: 2/23/2025    INDICATION: Trauma, pain in the dorsal midfoot and lateral midfoot  COMPARISON: None available at time of dictation.      Impression    IMPRESSION:      There are small avulsion fracture fragments along the dorsal, distal aspect of the navicular on the lateral view and along the lateral aspect of the anterior calcaneal process on the AP views. These have an age-indeterminate appearance and could be   acute. Correlation with point tenderness is recommended.    There is hallux valgus with bunion formation. Very mild scattered degenerative changes. Diffuse soft tissue edema over the ankle.   Ankle XR, G/E 3 views, right     Status: None    Narrative    EXAM: XR FOOT RIGHT G/E 3 VIEWS, XR ANKLE RIGHT G/E 3 VIEWS  LOCATION: [facility]  DATE: 2/23/2025    INDICATION: Trauma, pain in the dorsal midfoot and lateral midfoot  COMPARISON: None available at time of dictation.      Impression    IMPRESSION:      There are small avulsion fracture fragments along the dorsal, distal aspect of the navicular on the lateral view and along the lateral aspect of the anterior calcaneal process on the AP views. These have an age-indeterminate appearance and could be   acute. Correlation with point tenderness is recommended.    There is hallux valgus with bunion formation. Very mild scattered degenerative changes. Diffuse soft tissue edema over the ankle.        I independently reviewed the X-rays: Agree with the Radiologist's interpretation, age-indeterminate small fracture fragments along the dorsal, distal aspect of the navicular bone and along the lateral aspect of the anterior calcaneus process.      Medications   acetaminophen (TYLENOL) tablet 975 mg (975 mg Oral $Given 2/23/25 1856)   oxyCODONE (ROXICODONE) tablet 10 mg (10 mg Oral $Given 2/23/25 1956)       Assessments & Plan (with Medical Decision Making)   Right foot and right ankle injury which occurred when she injured her ankle walking up stairs carrying a box ~ 24 hours ago.  Unclear mechanism of injury, ? twisting mechanism of injury.  Distal neurovascular/CMS function intact.  No other injury or trauma.  She has chronic left foot pain with diagnoses as documented on CT imaging below, is followed by Dr. Douglas of Podiatry, has been wearing a left cam walker boot splint as scheduled for left foot surgery.  Plain films of the right foot and right ankle were remarkable for small avulsion fracture fragments along the dorsal, distal aspect of the navicular on the lateral view and along the lateral aspect of the anterior calcaneal process on the AP views. These have an age-indeterminate appearance and could be acute.   Age indeterminate findings on plain film with this on exam, will treat as  presumed acute fractures with a cam walker immobilizing boot splint.  She declined crutches.  Will refer her to her Podiatrist for follow-up in the coming week. She was provided instructions for supportive care and will return as needed for worsened condition or worsening symptoms, or new problems or concerns.  I prescribed a small number of oxycodone to use for pain refractory to OTC analgesics.      I have reviewed the nursing notes.    I have reviewed the findings, diagnosis, plan and need for follow up with the patient.      Discharge Medication List as of 2/23/2025  7:53 PM        START taking these medications    Details   oxyCODONE (ROXICODONE) 5 MG tablet Take 1 tablet (5 mg) by mouth every 6 hours as needed., Disp-12 tablet, R-0, E-Prescribe             Final diagnoses:   Displaced fracture of anterior process of right calcaneus, initial encounter for closed fracture   Closed displaced fracture of navicular bone of right foot, initial encounter       2/23/2025   [facility] EMERGENCY DEPT       Jama De Luna MD  02/25/25 2112

## 2025-02-24 NOTE — NURSING NOTE
Divina Delgadillo's goals for this visit include:   Chief Complaint   Patient presents with    RECHECK     Follow up CKD        She requests these members of her care team be copied on today's visit information: no     PCP: Jaleesa Velasquez    Referring Provider:  Referred Self, MD  No address on file    /69 (BP Location: Left arm, Patient Position: Chair, Cuff Size: Adult Regular)   Pulse 87   SpO2 (!) 91%     Do you need any medication refills at today's visit? No     CATINA Kay   Neph/Pulm Teams  [facility]

## 2025-02-24 NOTE — Clinical Note
Hi! Divina has questions about whether she should follow-up with you yearly or who will follow the liver. Appreciate your input. If needing a follow-up, could your team reach out to her? Thank you and hope you are well! Sánchez

## 2025-02-24 NOTE — PATIENT INSTRUCTIONS
Stop lisinopril  Repeat lab today after your visit.   Hematology referral  Labs in one month - at that time do the urine sample.   6 month follow-up.

## 2025-02-25 ENCOUNTER — TELEPHONE (OUTPATIENT)
Dept: FAMILY MEDICINE | Facility: CLINIC | Age: 40
End: 2025-02-25
Payer: COMMERCIAL

## 2025-02-25 ENCOUNTER — PATIENT OUTREACH (OUTPATIENT)
Dept: ONCOLOGY | Facility: CLINIC | Age: 40
End: 2025-02-25
Payer: COMMERCIAL

## 2025-02-25 DIAGNOSIS — G43.009 MIGRAINE WITHOUT AURA AND WITHOUT STATUS MIGRAINOSUS, NOT INTRACTABLE: Primary | ICD-10-CM

## 2025-02-25 NOTE — PROGRESS NOTES
CHIEF COMPLAINT:   Chief Complaint   Patient presents with    Left Thigh - RECHECK     Femur IMN on 1/22/24, 1 yr 1 mon s/p. Patient notes she broke bilateral foot. She has been wearing a boot on the left foot for 2 months, right foot since Monday. Patient notes her left hip is hurting. Pain is worse when walking and laying. Pain began yesterday. Pain is on the lateral side of hip. Pain can radiate down the leg on the lateral side. She is taking tylenol for pain. She was taking oxycodone but had a reaction to it on Monday (jaye ontiveros). She was given that for foot fracture.          SURGICAL PROCEDURE: left femur fracture IMN ([place])  DATE OF PROCEDURE: 1/22/2024      HISTORY OF PRESENT ILLNESS    Divina Delgadillo is a 39 year old female seen left hip pain x1 day. No injury., has been weight bearing in bilateral fracture boots for broken feet. Pain along the side of the hip, aggravated with up/down, walking, mostly laying on the left side. Treatment with oxycodone, but had a bad reaction. Felt groggy, like a hangover.    She is status post IMN left femur fracture 1/2024 while in [place]. We had followed her postoperative, and has done well overall.    Recall injury when she was in [place], on [place] sidewalk at the airport and her boot caught and went flying into the air and landed on her left side.  She was taken to a hospital in [place], noted to have a left femur fracture, and had an intramedullary nail placed.      She is diabetic, A1c=8.1 on 10/29/2024      Other PMH:  has a past medical history of Acquired asplenia, Acute pain of left knee (03/22/2019), Acute-on-chronic kidney injury (03/22/2019), ARF (acute renal failure) (03/23/2019), Asthma, Bipolar disorder (H), Cardiac murmur, Cervical high risk HPV (human papillomavirus) test positive (06/20/2017), Chiari malformation type I (H), Chronic bilateral low back pain without sciatica, Deep venous thrombosis of arm (H) (01/06/2017), Depressive disorder,  DVT (deep venous thrombosis) (H), DVT of upper extremity (deep vein thrombosis) (H) (2021), Esophageal reflux, Hypertension, Insomnia, Leukocytosis, Mild intermittent asthma, Morbid obesity (H), Mucinous neoplasm of pancreas, NAFL (nonalcoholic fatty liver), Neck pain, Nicotine abuse, Other specified types of schizophrenia, unspecified condition, Schizoaffective disorder, depressive type (H), Stage 3a chronic kidney disease (CKD) (H), Type 2 diabetes mellitus (H), Ulnar neuropathy of both upper extremities, and Vitamin D insufficiency.    She has no past medical history of Arthritis, Cerebral infarction (H), Congestive heart failure (H), COPD (chronic obstructive pulmonary disease) (H), Heart disease, History of blood transfusion, or Thyroid disease.  Patient Active Problem List   Diagnosis    Esophageal reflux    NAFL (nonalcoholic fatty liver)    Essential hypertension    Hyperlipidemia LDL goal <100    Stage 3 chronic kidney disease    Mucinous neoplasm of pancreas    Type 2 diabetes mellitus with stage 3 chronic kidney disease, with long-term current use of insulin (H)    Bipolar disorder (H)    Cervical high risk HPV (human papillomavirus) test positive    IUD (intrauterine device) in place    Morbid obesity (H)    Mild intermittent asthma with acute exacerbation    Nicotine abuse    Chronic leukocytosis    Acquired asplenia    Borderline personality disorder (H)    PTSD (post-traumatic stress disorder)    Long term (current) use of anticoagulants    Orthostatic hypotension    Tobacco abuse    Vitamin D insufficiency    Depressive disorder    Schizoaffective disorder, depressive type (H)    Cardiac murmur    History of DVT of arm  (deep vein thrombosis)    Insomnia, unspecified type    Ulnar neuropathy of both upper extremities    Chiari malformation type I (H)    Deep venous thrombosis of arm (H)    Neutrophilic leukocytosis    Other fracture of left femur, initial encounter for closed fracture (H)    Peroneal  tendinitis of left lower extremity    Acute left ankle pain    Avascular necrosis of bone of ankle (H)    Secondary renal hyperparathyroidism       Surgical Hx:  has a past surgical history that includes ENT surgery (10/01/2000); tonsillectomy (10/01/2000); splenectomy (2015); Adrenalectomy (Left, 2015); Pancreatectomy partial (2015); Percutaneous biopsy liver (Right, 11/14/2019); IR Liver Biopsy Percutaneous (11/14/2019); biopsy; Breast surgery (2013); and colonoscopy.    Medications:   Current Outpatient Medications:     ACCU-CHEK GUIDE test strip, Use to test blood sugar 3 times daily or as directed., Disp: 150 strip, Rfl: 1    acetaminophen (TYLENOL) 500 MG tablet, Take 1-2 tablets (500-1,000 mg) by mouth every 6 hours as needed for mild pain., Disp: 40 tablet, Rfl: 0    acetaminophen (TYLENOL) 500 MG tablet, Take 1-2 tablets (500-1,000 mg) by mouth every 8 hours as needed for mild pain, Disp: 30 tablet, Rfl: 0    albuterol (VENTOLIN HFA) 108 (90 Base) MCG/ACT inhaler, INHALE 2 PUFFS INTO THE LUNGS EVERY SIX  HOURS AS NEEDED FOR SHORTNESS OF BREATH, Disp: 18 g, Rfl: 5    alendronate (FOSAMAX) 70 MG tablet, Take 1 tablet (70 mg) by mouth every 7 days, Disp: 4 tablet, Rfl: 2    amitriptyline (ELAVIL) 10 MG tablet, Take 1 tablet (10 mg) by mouth at bedtime., Disp: 30 tablet, Rfl: 0    ARIPiprazole (ABILIFY) 15 MG tablet, Take 1 Tablet (15 mg) by mouth once daily in the evening., Disp: , Rfl:     ARIPiprazole (ABILIFY) 30 MG tablet, Take 30 mg by mouth daily, Disp: , Rfl:     atorvastatin (LIPITOR) 20 MG tablet, Take 1 tablet (20 mg) by mouth daily, Disp: 90 tablet, Rfl: 3    Biotin 5 MG TABS, Take 1 tablet by mouth daily, Disp: 100 tablet, Rfl: 1    blood glucose (ACCU-CHEK GUIDE) test strip, Use to test blood sugar 3 times daily or as directed., Disp: 100 strip, Rfl: 11    bumetanide (BUMEX) 0.5 MG tablet, Take 1 tablet (0.5 mg) by mouth daily, Disp: 30 tablet, Rfl: 2    carvedilol (COREG) 12.5 MG tablet, Take 1  "tablet (12.5 mg) by mouth 2 times daily, Disp: 60 tablet, Rfl: 11    cloZAPine (CLOZARIL) 50 MG tablet, Take 150 mg by mouth at bedtime, Disp: , Rfl:     diclofenac (VOLTAREN) 1 % topical gel, Apply 2 g topically 4 times daily, Disp: 150 g, Rfl: 1    docusate sodium (COLACE) 100 MG capsule, Take 1 capsule (100 mg) by mouth 2 times daily as needed for constipation., Disp: 60 capsule, Rfl: 5    FLUoxetine (PROZAC) 40 MG capsule, Take 40 mg by mouth daily, Disp: , Rfl:     glucose (BD GLUCOSE) 4 g chewable tablet, Take 1 tablet by mouth every hour as needed for low blood sugar, Disp: 30 tablet, Rfl: 4    hydrOXYzine HCl (ATARAX) 25 MG tablet, Take 25 mg by mouth 3 times daily as needed for anxiety, Disp: , Rfl:     insulin aspart (NOVOLOG VIAL) 100 UNITS/ML vial, for use with continuous insulin pump  Max TDD 80, Disp: 150 mL, Rfl: 1    Insulin Infusion Pump (MINIMED 780G INSULIN PUMP) KIT, 1 each daily SmartGuard Target: 100; Active Insulin Time: 2 hours (recommended); SmartGuardTM Warmup/Manual Mode: Suspend before low (recommended), Disp: 1 kit, Rfl: 0    Insulin Infusion Pump Supplies (EXTENDED INFUSION SET 23\"/6MM) MISC, 1 each every 7 days Max Total Daily Dose 70 units; Change infusion set & reservoir every 7 days (recommended), Disp: 10 each, Rfl: 6    Insulin Infusion Pump Supplies (EXTENDED RESERVOIR 3ML) MISC, 1 each every 7 days, Disp: 10 each, Rfl: 11    lamoTRIgine (LAMICTAL) 100 MG tablet, Take 100 mg by mouth At Bedtime, Disp: , Rfl:     loratadine (CLARITIN) 10 MG tablet, Take 1 tablet (10 mg) by mouth every morning, Disp: 30 tablet, Rfl: 11    meclizine (ANTIVERT) 25 MG tablet, Take 1 tablet (25 mg) by mouth 3 times daily as needed for dizziness, Disp: 30 tablet, Rfl: 0    nicotine (NICODERM CQ) 14 MG/24HR 24 hr patch, Place 1 patch over 24 hours onto the skin every 24 hours., Disp: 7 patch, Rfl: 0    nicotine (NICODERM CQ) 21 MG/24HR 24 hr patch, Place 1 patch over 24 hours onto the skin every 24 " hours. 21 mg for about 2 weeks, then decrease to 14 mg for about 7 days and then decrease to 7 mg for up to 7 days and stop, Disp: 14 patch, Rfl: 0    nicotine (NICODERM CQ) 7 MG/24HR 24 hr patch, Place 1 patch over 24 hours onto the skin every 24 hours. (Patient not taking: Reported on 2/24/2025), Disp: 7 patch, Rfl: 0    nitroGLYcerin 0.2% ointment, Apply 1 click (0.25 g) topically every 24 hours for 14 days, Disp: 4 g, Rfl: 0    omeprazole (PRILOSEC) 20 MG DR capsule, TAKE 1 CAPSULE BY MOUTH EVERY DAY, Disp: 90 capsule, Rfl: 2    oxyCODONE (ROXICODONE) 5 MG tablet, Take 1 tablet (5 mg) by mouth every 6 hours as needed., Disp: 12 tablet, Rfl: 0    Suvorexant (BELSOMRA) 10 MG tablet, Take 10 mg by mouth at bedtime, Disp: , Rfl:     topiramate (TOPAMAX) 50 MG tablet, Take 1 tablet (50 mg) by mouth daily., Disp: 90 tablet, Rfl: 1    Allergies:   No Known Allergies      Social Hx:  reports that she has been smoking cigarettes and vaping device. She started smoking about 15 months ago. She has a 0.1 pack-year smoking history. She has been exposed to tobacco smoke. She has never used smokeless tobacco. She reports that she does not drink alcohol and does not use drugs.    Family Hx: family history includes Allergies in her brother, mother, sister, and sister; Anxiety Disorder in her father and mother; Asthma in her mother; Chronic Obstructive Pulmonary Disease in her mother; Depression in her father, mother, sister, and sister; Diabetes in her father; Heart Disease in her father; Hypertension in her father; Mental Illness in her father and mother; Obesity in her father; Respiratory in her brother, mother, sister, and sister; Sleep Apnea in her maternal grandfather.    REVIEW OF SYSTEMS:  CONSTITUTIONAL:NEGATIVE for fever, chills, change in weight  INTEGUMENTARY/SKIN: NEGATIVE for worrisome rashes, moles or lesions  MUSCULOSKELETAL:See HPI above  NEURO: NEGATIVE for weakness, dizziness or paresthesias      PHYSICAL  "EXAM:  /78   Pulse 80   Ht 1.6 m (5' 3\")   BMI 42.51 kg/m     GENERAL APPEARANCE: healthy, alert, no distress  ; accompanied by her mother.  SKIN: no suspicious lesions or rashes  NEURO: Normal strength and tone, mentation intact and speech normal  PSYCH:  mentation appears normal and affect normal  RESPIRATORY: No increased work of breathing.          left LOWER EXTREMITY EXAM:  Gait: tberg, slight favors the left.  Intact sensation deep peroneal nerve, superficial peroneal nerve, med/lat tibial nerve, sural nerve, saphenous nerve  Intact EHL, EDL, TA, FHL, GS, quadriceps hamstrings and hip flexors  Mild left quadriceps weakness.  calf soft and nttp or squeeze.  Edema: 1+    Wound / Incision: multiple surgical wounds healed well. Dry. No drainage. no erythema.  Inspection: no ecchymosis or notable swelling.  Tender to palpation gluteals, greater trochanter, iliotibial band from hip to the knee.  Nontender to palpation groin.  Full, pain-free hip range of motion.     No knee effusion  Positive patello-femoral crepitus and grind tests.    Strength: grossly intact, weak.   No discomfort with hip range of motion.      X-RAY:  AP pelvis,  2 views left hip/ femur from 2/26/2025 were reviewed in clinic today. Status post intramedullary ulysses and screw fixation across the proximal femoral diaphysis fracture. No hardware complication. Interval healing with increased osseous bridging. Otherwise unchanged.               Impression: 39 year old female 13 months status post IMN left femoral shaft fracture, with acute left hip pain, consistent with trochanteric bursitis/tendinitis.      Plan:     * reviewed xrays with patient, hip and fracture alignment all healed without abnormality.    * discussed with patient that symptoms and exam consistent with trochanteric bursitis and iliotibial band tendinitis. Likely related to altered gait mechanics wearing fracture boots for foot fractures.    . Discussed various treatment " options, including:    * Activity modification - avoid impact activities or activities that aggravate symptoms.  * NSAIDS - regular use for inflammation ( twice daily or three times daily), with food, as long as no contra-indications Please discuss with pcp if needed  * Stretching of iliotibial band.  * PT in the future as needed for strengthening, stretching and range of motion exercises, especially stretching of iliotibial band. Modalities as indicated.  * Tylenol as needed for pain  * avoid laying on affected side.  * ice/ heat as needed.  * Injections: consider in future as needed.  * Return to clinic as needed.    Workability: ok to return to work without restrictions.    * All questions were addressed and answered prior to discharge from clinic today. The patient acknowledges an understanding of and agreement with the plan set forth during today's visit. Patient was advised to call our office or MyChart us if any further questions arise upon leaving our office today.        Jimmie Nino M.D., M.S.  Dept. of Orthopaedic Surgery  [facility]

## 2025-02-25 NOTE — PROGRESS NOTES
New Patient Oncology Nurse Navigator Note     Referral Received: 02/25/25      Referring provider: Sánchez Dias DO     Referring Clinic/Organization: [facility]     Referred to: Benign Hematology    Requested provider (if applicable): First available - did not specify      Evaluation for :   Diagnosis   D72.829 (ICD-10-CM) - Leukocytosis, unspecified type      Clinical History (per Nurse review of records provided):       Latest Reference Range & Units 11/07/24 12:14 12/11/24 08:42 01/02/25 13:23 01/27/25 09:59 02/02/25 21:51 02/10/25 13:22   WBC 4.0 - 11.0 10e3/uL 15.1 (H) 14.7 (H) 15.8 (H) 17.6 (H) 20.4 (H) 15.7 (H)   Hemoglobin 11.7 - 15.7 g/dL 11.8 11.2 (L) 11.4 (L) 11.8 10.9 (L) 11.0 (L)   Hematocrit 35.0 - 47.0 % 37.4 36.6 35.9 37.8 36.2 36.9   Platelet Count 150 - 450 10e3/uL 377 405 371 407 388 405   RBC Count 3.80 - 5.20 10e6/uL 3.99 3.87 3.85 4.03 3.65 (L) 3.76 (L)   MCV 78 - 100 fL 94 95 93 94 99 98   MCH 26.5 - 33.0 pg 29.6 28.9 29.6 29.3 29.9 29.3   MCHC 31.5 - 36.5 g/dL 31.6 30.6 (L) 31.8 31.2 (L) 30.1 (L) 29.8 (L)   RDW 10.0 - 15.0 % 15.3 (H) 15.3 (H) 15.2 (H) 14.7 15.3 (H) 15.1 (H)   Pathologist Review Comments (Blood)      Reviewed    % Neutrophils % 63 71 69 70 62    % Lymphocytes % 28 21 23 22 28    % Monocytes % 6 6 5 5 5    % Eosinophils % 2 2 2 2 3    % Basophils % 1 1 1 0 2    Absolute Basophils 0.0 - 0.2 10e3/uL 0.1 0.1 0.1 0.1     Absolute Basophils 0.0 - 0.2 10e3/uL     0.4 (H)    Absolute Neutrophil 1.6 - 8.3 10e3/uL     12.6 (H)    Absolute Lymphocytes 0.8 - 5.3 10e3/uL     5.7 (H)    Absolute Monocytes 0.0 - 1.3 10e3/uL     1.0    Absolute Eosinophils 0.0 - 0.7 10e3/uL     0.6    Absolute Eosinophils 0.0 - 0.7 10e3/uL 0.2 0.3 0.3 0.4     Absolute Immature Granulocytes <=0.4 10e3/uL 0.1 0.1 0.1 0.1     Absolute Lymphocytes 0.8 - 5.3 10e3/uL 4.3 3.0 3.6 3.9     Absolute Monocytes 0.0 - 1.3 10e3/uL 0.9 0.9 0.9 0.8     % Immature Granulocytes % 1 0 0 1     Absolute  Neutrophils 1.6 - 8.3 10e3/uL 9.5 (H) 10.4 (H) 10.9 (H) 12.3 (H)     Absolute NRBCs 10e3/uL 0.0        NRBCs per 100 WBC <1 /100 0        RBC Morphology      Confirmed RBC Indices    Platelet Morphology Automated Count Confirmed. Platelet morphology is normal.      Automated Count Confirmed. Platelet morphology is normal.    RBC Fragments None Seen      Rare !    Teardrop Cells None Seen      Slight !    (H): Data is abnormally high  (L): Data is abnormally low  !: Data is abnormal    Records Location: [facility]     Additional testing needed prior to consult:     ?    Referral updates and Plan:     02/25/2025 11:31 AM -  Referral received and reviewed. Sent to NPS to schedule next available.    Pamela Garcia, GABIN, RN  Hematology/Oncology Nurse Navigator  [facility]  [phone] / [phone]

## 2025-02-25 NOTE — TELEPHONE ENCOUNTER
RECORDS STATUS - ALL OTHER DIAGNOSIS      RECORDS RECEIVED FROM: [facility]   NOTES STATUS DETAILS   OFFICE NOTE from referring provider Epic 2/24/2025 - Dr. Sánchez Dias   OFFICE [facility] medical onc[facility] - Dr. Meghan Rosenthal    [facility]:  6/27/2016 - Dr. Philip Hancock   DISCHARGE REPORT from the ER [facility] 2/23/2025 - [place] [facility]   MEDICATION LIST [facility]    LABS     PATHOLOGY REPORTS     ANYTHING RELATED TO DIAGNOSIS Epic 2/24/2025

## 2025-02-26 ENCOUNTER — TELEPHONE (OUTPATIENT)
Dept: FAMILY MEDICINE | Facility: CLINIC | Age: 40
End: 2025-02-26

## 2025-02-26 ENCOUNTER — OFFICE VISIT (OUTPATIENT)
Dept: AUDIOLOGY | Facility: CLINIC | Age: 40
End: 2025-02-26
Payer: COMMERCIAL

## 2025-02-26 ENCOUNTER — OFFICE VISIT (OUTPATIENT)
Dept: ORTHOPEDICS | Facility: CLINIC | Age: 40
End: 2025-02-26
Payer: COMMERCIAL

## 2025-02-26 ENCOUNTER — OFFICE VISIT (OUTPATIENT)
Dept: PODIATRY | Facility: CLINIC | Age: 40
End: 2025-02-26
Attending: EMERGENCY MEDICINE
Payer: COMMERCIAL

## 2025-02-26 ENCOUNTER — ANCILLARY PROCEDURE (OUTPATIENT)
Dept: GENERAL RADIOLOGY | Facility: CLINIC | Age: 40
End: 2025-02-26
Attending: ORTHOPAEDIC SURGERY
Payer: COMMERCIAL

## 2025-02-26 VITALS
SYSTOLIC BLOOD PRESSURE: 137 MMHG | BODY MASS INDEX: 42.51 KG/M2 | DIASTOLIC BLOOD PRESSURE: 78 MMHG | HEIGHT: 63 IN | HEART RATE: 80 BPM

## 2025-02-26 VITALS — HEIGHT: 63 IN | WEIGHT: 240 LBS | BODY MASS INDEX: 42.52 KG/M2

## 2025-02-26 DIAGNOSIS — S92.021A DISPLACED FRACTURE OF ANTERIOR PROCESS OF RIGHT CALCANEUS, INITIAL ENCOUNTER FOR CLOSED FRACTURE: ICD-10-CM

## 2025-02-26 DIAGNOSIS — M70.62 TROCHANTERIC BURSITIS OF LEFT HIP: ICD-10-CM

## 2025-02-26 DIAGNOSIS — S92.251A CLOSED DISPLACED FRACTURE OF NAVICULAR BONE OF RIGHT FOOT, INITIAL ENCOUNTER: ICD-10-CM

## 2025-02-26 DIAGNOSIS — M25.552 LEFT HIP PAIN: ICD-10-CM

## 2025-02-26 DIAGNOSIS — E11.43 TYPE II DIABETES MELLITUS WITH PERIPHERAL AUTONOMIC NEUROPATHY (H): ICD-10-CM

## 2025-02-26 DIAGNOSIS — H90.3 BILATERAL SENSORINEURAL HEARING LOSS: Primary | ICD-10-CM

## 2025-02-26 DIAGNOSIS — S92.022K CLOSED DISPLACED FRACTURE OF ANTERIOR PROCESS OF LEFT CALCANEUS WITH NONUNION, SUBSEQUENT ENCOUNTER: Primary | ICD-10-CM

## 2025-02-26 DIAGNOSIS — M25.552 LEFT HIP PAIN: Primary | ICD-10-CM

## 2025-02-26 PROCEDURE — 1125F AMNT PAIN NOTED PAIN PRSNT: CPT | Performed by: ORTHOPAEDIC SURGERY

## 2025-02-26 PROCEDURE — 99213 OFFICE O/P EST LOW 20 MIN: CPT | Performed by: ORTHOPAEDIC SURGERY

## 2025-02-26 PROCEDURE — 73502 X-RAY EXAM HIP UNI 2-3 VIEWS: CPT | Mod: TC | Performed by: STUDENT IN AN ORGANIZED HEALTH CARE EDUCATION/TRAINING PROGRAM

## 2025-02-26 PROCEDURE — 3075F SYST BP GE 130 - 139MM HG: CPT | Performed by: ORTHOPAEDIC SURGERY

## 2025-02-26 PROCEDURE — 3078F DIAST BP <80 MM HG: CPT | Performed by: ORTHOPAEDIC SURGERY

## 2025-02-26 RX ORDER — TOPIRAMATE 25 MG/1
25 TABLET, FILM COATED ORAL 2 TIMES DAILY
Qty: 180 TABLET | OUTPATIENT
Start: 2025-02-26

## 2025-02-26 RX ORDER — TOPIRAMATE 25 MG/1
25 TABLET, FILM COATED ORAL 2 TIMES DAILY
Qty: 60 TABLET | Refills: 2 | Status: SHIPPED | OUTPATIENT
Start: 2025-02-26

## 2025-02-26 ASSESSMENT — PAIN SCALES - GENERAL: PAINLEVEL_OUTOF10: SEVERE PAIN (8)

## 2025-02-26 NOTE — PATIENT INSTRUCTIONS

## 2025-02-26 NOTE — PROGRESS NOTES
AUDIOLOGY REPORT    SUBJECTIVE: Divina Delgadillo, a 39 year old female, was seen in the Audiology Clinic at [facility] today for a Binaural hearing aid fitting. Previous results have revealed a bilateral sensorineural hearing loss. The patient was given medical clearance to pursue amplification by  Jose Renteria MD. Patient was unaccompanied to today's visit.     OBJECTIVE:  Prior to fitting, a hearing aid check was performed to ensure device functionality. The hearing aid conformity evaluation was completed.The hearing aids were placed and they provided a good fit. Real-ear-probe-microphone measurements were completed on the [employer] system and were an acceptable match to NAL-NL2 target with soft sounds audible, moderate sounds comfortable, and loud sounds below discomfort. UCLs are verified through maximum power output measures and demonstrate appropriate limiting of loud inputs. Ms. Delgadillo was oriented to proper hearing aid use, care, cleaning (no water, dry brush), batteries (size rechargeable, insertion/removal, toxicity, low-battery signal), aid insertion/removal, user booklet, warranty information, storage cases, and other hearing aid details. The patient confirmed understanding of hearing aid use and care, and showed proper insertion of hearing aid and batteries while in the office today. Ms. Delgadillo reported good volume and sound quality today.    EAR(S) FIT: Binaural  MA HEARING AID MAKE: Right: ReSound Nexia 7R; Left: ReSound Nexia 7R    MA HEARING AID MODEL #: Right: [id number]; Left: [id number]  HEARING AID STYLE: Right: HOLLEY; Left: HOLLEY  DOME SIZE: Right:  Tulip; Left::  Tulip   LENGTH: Right:  3 MP; Left:  3 MP  SERIAL NUMBERS: Right: [id number]; Left: [id number]  WARRANTY END DATE: Right: 3/13/2028; Left:: 3/13/2028    ASSESSMENT: Binaural  hearing aid fitting completed today. Verification measures were performed. The 45 day trial period was explained to  patient, and they expressed understanding. Ms. Delgadillo signed the Hearing Aid Purchase Agreement and was given a copy, as well as details on her hearing aids. Patient was counseled that exact out of pocket amounts cannot be determined for hearing aid claims being sent to insurance. Any insurance coverage information presented to the patient is an estimate only, and is not a guarantee of payment. Patient has been advised to check with their own insurance.    PLAN: Ms. Delgadillo will return for follow-up in 2-3 weeks for a hearing aid review appointment. Please call this clinic with questions regarding today s appointment.    Tommy Cassidy [facility]  Licensed Audiologist #[id number]  2/26/2025

## 2025-02-26 NOTE — LETTER
"2/26/2025      Divina Delgadillo  [address] [address] [address]      Dear Colleague,    Thank you for referring your patient, Divina Delgadillo, to the [facility] [place]. Please see a copy of my visit note below.    Divina returns to the office for reevaluation of the left foot.  The patient relates following the instructions given at the last visit with noted overall continued pain and minimal improvement in function of the left foot.   The patient relates recently injuring her right foot with pain over the arch of the right foot    Vitals: Ht 1.6 m (5' 3\")   Wt 108.9 kg (240 lb)   BMI 42.51 kg/m    BMI= Body mass index is 42.51 kg/m .    Lower Extremity Physical Exam:      Neurovascular status remains unchanged.  Muscular exam is within normal limits to major muscle groups.  Integument is intact.      Noted pain on palpation of the sinus tarsi on the left.  Noted pain on palpation over the dorsal aspect of the navicular bone on the right foot.  Mild to moderate edema noted.  No ecchymosis noted.    Diagnostics:  Radiograph evaluation including three views of the right foot reveals small avulsion fracture off the dorsal aspect of the talonavicular joint.    I personally evaluated the images as well as reviewed the images with the patient pointing out the findings.      Assessment:     ICD-10-CM    1. 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 Type II diabetes mellitus with peripheral autonomic neuropathy (H)  E11.43       3. Closed displaced fracture of navicular bone of right foot, initial encounter  S92.251A Orthopedic  Referral          Plan:    I have explained to Divina about the progress of the conditions.  At this time, the patient will continue offloading both feet with use of the cam boots.  The patient will be treated with ultrasound bone stimulation therapy.  The patient will " return in 1 month for reevaluation and repeat x-rays.    Divina verbalized agreement with and understanding of the rational for the diagnosis and treatment plan.  All questions were answered to best of my ability and the patient's satisfaction. The patient was advised to contact the clinic with any questions that may arise after the clinic visit.      Disclaimer: This note consists of symbols derived from keyboarding, dictation and/or voice recognition software. As a result, there may be errors in the script that have gone undetected. Please consider this when interpreting information found in this chart.       ERNA Douglas D.P.M., F.A.C.F.A.S.      Again, thank you for allowing me to participate in the care of your patient.        Sincerely,        Raul Douglas DPM    Electronically signed

## 2025-02-26 NOTE — PROGRESS NOTES
Divina returns to the office for reevaluation of the {RIGHT /LEFT:083049} foot.  The patient relates following the instructions given at the last visit with noted overall {LESS/MORE:724770} pain and {LESS/MORE:538327} improvement in function of the {RIGHT /LEFT:430128} foot.   The patient relates no other problems.    Vitals: There were no vitals taken for this visit.  BMI= There is no height or weight on file to calculate BMI.    Lower Extremity Physical Exam:      Neurovascular status remains unchanged.  Muscular exam is within normal limits to major muscle groups.  Integument is intact.      Noted ***    Diagnostics:  Radiograph evaluation including three views of the {RIGHT /LEFT:742065} foot reveals interval healing with increased trabeculation of the ***.  I personally evaluated the images as well as reviewed the images with the patient pointing out the findings.      Assessment:     ICD-10-CM    1. 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 Type II diabetes mellitus with peripheral autonomic neuropathy (H)  E11.43           Plan:    I have explained to Divina about the progress of the conditions.  At this time, ***    Divina verbalized agreement with and understanding of the rational for the diagnosis and treatment plan.  All questions were answered to best of my ability and the patient's satisfaction. The patient was advised to contact the clinic with any questions that may arise after the clinic visit.      Disclaimer: This note consists of symbols derived from keyboarding, dictation and/or voice recognition software. As a result, there may be errors in the script that have gone undetected. Please consider this when interpreting information found in this chart.       ERNA Douglas D.P.M., FALIYAH.F.A.S.     advised to contact the clinic with any questions that may arise after the clinic visit.      Disclaimer: This note consists of symbols derived from keyboarding, dictation and/or voice recognition software. As a result, there may be errors in the script that have gone undetected. Please consider this when interpreting information found in this chart.       ERNA Douglas D.P.M., [email or web address].

## 2025-02-26 NOTE — LETTER
2/26/2025      Divina Delgadillo  [address]  [place] [address]      Dear Colleague,    Thank you for referring your patient, Divina Delgadillo, to the [facility]. Please see a copy of my visit note below.    CHIEF COMPLAINT:   Chief Complaint   Patient presents with     Left Thigh - RECHECK     Femur IMN on 1/22/24, 1 yr 1 mon s/p. Patient notes she broke bilateral foot. She has been wearing a boot on the left foot for 2 months, right foot since Monday. Patient notes her left hip is hurting. Pain is worse when walking and laying. Pain began yesterday. Pain is on the lateral side of hip. Pain can radiate down the leg on the lateral side. She is taking tylenol for pain. She was taking oxycodone but had a reaction to it on Monday (shaky jerky). She was given that for foot fracture.          SURGICAL PROCEDURE: left femur fracture IMN ([place])  DATE OF PROCEDURE: 1/22/2024      HISTORY OF PRESENT ILLNESS    Divina Delgadillo is a 39 year old female seen left hip pain x1 day. No injury., has been weight bearing in bilateral fracture boots for broken feet. Pain along the side of the hip, aggravated with up/down, walking, mostly laying on the left side. Treatment with oxycodone, but had a bad reaction. Felt groggy, like a hangover.    She is status post IMN left femur fracture 1/2024 while in [place]. We had followed her postoperative, and has done well overall.    Recall injury when she was in [place], [place] sidewalk at the airport and her boot caught and went flying into the air and landed on her left side.  She was taken to a hospital in [place], noted to have a left femur fracture, and had an intramedullary nail placed.      She is diabetic, A1c=8.1 on 10/29/2024      Other PMH:  has a past medical history of Acquired asplenia, Acute pain of left knee (03/22/2019), Acute-on-chronic kidney injury (03/22/2019), ARF (acute renal failure) (03/23/2019), Asthma,  Bipolar disorder (H), Cardiac murmur, Cervical high risk HPV (human papillomavirus) test positive (06/20/2017), Chiari malformation type I (H), Chronic bilateral low back pain without sciatica, Deep venous thrombosis of arm (H) (01/06/2017), Depressive disorder, DVT (deep venous thrombosis) (H), DVT of upper extremity (deep vein thrombosis) (H) (2021), Esophageal reflux, Hypertension, Insomnia, Leukocytosis, Mild intermittent asthma, Morbid obesity (H), Mucinous neoplasm of pancreas, NAFL (nonalcoholic fatty liver), Neck pain, Nicotine abuse, Other specified types of schizophrenia, unspecified condition, Schizoaffective disorder, depressive type (H), Stage 3a chronic kidney disease (CKD) (H), Type 2 diabetes mellitus (H), Ulnar neuropathy of both upper extremities, and Vitamin D insufficiency.    She has no past medical history of Arthritis, Cerebral infarction (H), Congestive heart failure (H), COPD (chronic obstructive pulmonary disease) (H), Heart disease, History of blood transfusion, or Thyroid disease.  Patient Active Problem List   Diagnosis     Esophageal reflux     NAFL (nonalcoholic fatty liver)     Essential hypertension     Hyperlipidemia LDL goal <100     Stage 3 chronic kidney disease     Mucinous neoplasm of pancreas     Type 2 diabetes mellitus with stage 3 chronic kidney disease, with long-term current use of insulin (H)     Bipolar disorder (H)     Cervical high risk HPV (human papillomavirus) test positive     IUD (intrauterine device) in place     Morbid obesity (H)     Mild intermittent asthma with acute exacerbation     Nicotine abuse     Chronic leukocytosis     Acquired asplenia     Borderline personality disorder (H)     PTSD (post-traumatic stress disorder)     Long term (current) use of anticoagulants     Orthostatic hypotension     Tobacco abuse     Vitamin D insufficiency     Depressive disorder     Schizoaffective disorder, depressive type (H)     Cardiac murmur     History of DVT of  arm  (deep vein thrombosis)     Insomnia, unspecified type     Ulnar neuropathy of both upper extremities     Chiari malformation type I (H)     Deep venous thrombosis of arm (H)     Neutrophilic leukocytosis     Other fracture of left femur, initial encounter for closed fracture (H)     Peroneal tendinitis of left lower extremity     Acute left ankle pain     Avascular necrosis of bone of ankle (H)     Secondary renal hyperparathyroidism       Surgical Hx:  has a past surgical history that includes ENT surgery (10/01/2000); tonsillectomy (10/01/2000); splenectomy (2015); Adrenalectomy (Left, 2015); Pancreatectomy partial (2015); Percutaneous biopsy liver (Right, 11/14/2019); IR Liver Biopsy Percutaneous (11/14/2019); biopsy; Breast surgery (2013); and colonoscopy.    Medications:   Current Outpatient Medications:      ACCU-CHEK GUIDE test strip, Use to test blood sugar 3 times daily or as directed., Disp: 150 strip, Rfl: 1     acetaminophen (TYLENOL) 500 MG tablet, Take 1-2 tablets (500-1,000 mg) by mouth every 6 hours as needed for mild pain., Disp: 40 tablet, Rfl: 0     acetaminophen (TYLENOL) 500 MG tablet, Take 1-2 tablets (500-1,000 mg) by mouth every 8 hours as needed for mild pain, Disp: 30 tablet, Rfl: 0     albuterol (VENTOLIN HFA) 108 (90 Base) MCG/ACT inhaler, INHALE 2 PUFFS INTO THE LUNGS EVERY SIX  HOURS AS NEEDED FOR SHORTNESS OF BREATH, Disp: 18 g, Rfl: 5     alendronate (FOSAMAX) 70 MG tablet, Take 1 tablet (70 mg) by mouth every 7 days, Disp: 4 tablet, Rfl: 2     amitriptyline (ELAVIL) 10 MG tablet, Take 1 tablet (10 mg) by mouth at bedtime., Disp: 30 tablet, Rfl: 0     ARIPiprazole (ABILIFY) 15 MG tablet, Take 1 Tablet (15 mg) by mouth once daily in the evening., Disp: , Rfl:      ARIPiprazole (ABILIFY) 30 MG tablet, Take 30 mg by mouth daily, Disp: , Rfl:      atorvastatin (LIPITOR) 20 MG tablet, Take 1 tablet (20 mg) by mouth daily, Disp: 90 tablet, Rfl: 3     Biotin 5 MG TABS, Take 1 tablet by  "mouth daily, Disp: 100 tablet, Rfl: 1     blood glucose (ACCU-CHEK GUIDE) test strip, Use to test blood sugar 3 times daily or as directed., Disp: 100 strip, Rfl: 11     bumetanide (BUMEX) 0.5 MG tablet, Take 1 tablet (0.5 mg) by mouth daily, Disp: 30 tablet, Rfl: 2     carvedilol (COREG) 12.5 MG tablet, Take 1 tablet (12.5 mg) by mouth 2 times daily, Disp: 60 tablet, Rfl: 11     cloZAPine (CLOZARIL) 50 MG tablet, Take 150 mg by mouth at bedtime, Disp: , Rfl:      diclofenac (VOLTAREN) 1 % topical gel, Apply 2 g topically 4 times daily, Disp: 150 g, Rfl: 1     docusate sodium (COLACE) 100 MG capsule, Take 1 capsule (100 mg) by mouth 2 times daily as needed for constipation., Disp: 60 capsule, Rfl: 5     FLUoxetine (PROZAC) 40 MG capsule, Take 40 mg by mouth daily, Disp: , Rfl:      glucose (BD GLUCOSE) 4 g chewable tablet, Take 1 tablet by mouth every hour as needed for low blood sugar, Disp: 30 tablet, Rfl: 4     hydrOXYzine HCl (ATARAX) 25 MG tablet, Take 25 mg by mouth 3 times daily as needed for anxiety, Disp: , Rfl:      insulin aspart (NOVOLOG VIAL) 100 UNITS/ML vial, for use with continuous insulin pump  Max TDD 80, Disp: 150 mL, Rfl: 1     Insulin Infusion Pump (MINIMED 780G INSULIN PUMP) KIT, 1 each daily SmartGuard Target: 100; Active Insulin Time: 2 hours (recommended); SmartGuardTM Warmup/Manual Mode: Suspend before low (recommended), Disp: 1 kit, Rfl: 0     Insulin Infusion Pump Supplies (EXTENDED INFUSION SET 23\"/6MM) MISC, 1 each every 7 days Max Total Daily Dose 70 units; Change infusion set & reservoir every 7 days (recommended), Disp: 10 each, Rfl: 6     Insulin Infusion Pump Supplies (EXTENDED RESERVOIR 3ML) MISC, 1 each every 7 days, Disp: 10 each, Rfl: 11     lamoTRIgine (LAMICTAL) 100 MG tablet, Take 100 mg by mouth At Bedtime, Disp: , Rfl:      loratadine (CLARITIN) 10 MG tablet, Take 1 tablet (10 mg) by mouth every morning, Disp: 30 tablet, Rfl: 11     meclizine (ANTIVERT) 25 MG tablet, Take " 1 tablet (25 mg) by mouth 3 times daily as needed for dizziness, Disp: 30 tablet, Rfl: 0     nicotine (NICODERM CQ) 14 MG/24HR 24 hr patch, Place 1 patch over 24 hours onto the skin every 24 hours., Disp: 7 patch, Rfl: 0     nicotine (NICODERM CQ) 21 MG/24HR 24 hr patch, Place 1 patch over 24 hours onto the skin every 24 hours. 21 mg for about 2 weeks, then decrease to 14 mg for about 7 days and then decrease to 7 mg for up to 7 days and stop, Disp: 14 patch, Rfl: 0     nicotine (NICODERM CQ) 7 MG/24HR 24 hr patch, Place 1 patch over 24 hours onto the skin every 24 hours. (Patient not taking: Reported on 2/24/2025), Disp: 7 patch, Rfl: 0     nitroGLYcerin 0.2% ointment, Apply 1 click (0.25 g) topically every 24 hours for 14 days, Disp: 4 g, Rfl: 0     omeprazole (PRILOSEC) 20 MG DR capsule, TAKE 1 CAPSULE BY MOUTH EVERY DAY, Disp: 90 capsule, Rfl: 2     oxyCODONE (ROXICODONE) 5 MG tablet, Take 1 tablet (5 mg) by mouth every 6 hours as needed., Disp: 12 tablet, Rfl: 0     Suvorexant (BELSOMRA) 10 MG tablet, Take 10 mg by mouth at bedtime, Disp: , Rfl:      topiramate (TOPAMAX) 50 MG tablet, Take 1 tablet (50 mg) by mouth daily., Disp: 90 tablet, Rfl: 1    Allergies:   No Known Allergies      Social Hx:  reports that she has been smoking cigarettes and vaping device. She started smoking about 15 months ago. She has a 0.1 pack-year smoking history. She has been exposed to tobacco smoke. She has never used smokeless tobacco. She reports that she does not drink alcohol and does not use drugs.    Family Hx: family history includes Allergies in her brother, mother, sister, and sister; Anxiety Disorder in her father and mother; Asthma in her mother; Chronic Obstructive Pulmonary Disease in her mother; Depression in her father, mother, sister, and sister; Diabetes in her father; Heart Disease in her father; Hypertension in her father; Mental Illness in her father and mother; Obesity in her father; Respiratory in her brother,  "mother, sister, and sister; Sleep Apnea in her maternal grandfather.    REVIEW OF SYSTEMS:  CONSTITUTIONAL:NEGATIVE for fever, chills, change in weight  INTEGUMENTARY/SKIN: NEGATIVE for worrisome rashes, moles or lesions  MUSCULOSKELETAL:See HPI above  NEURO: NEGATIVE for weakness, dizziness or paresthesias      PHYSICAL EXAM:  /78   Pulse 80   Ht 1.6 m (5' 3\")   BMI 42.51 kg/m     GENERAL APPEARANCE: healthy, alert, no distress  ; accompanied by her mother.  SKIN: no suspicious lesions or rashes  NEURO: Normal strength and tone, mentation intact and speech normal  PSYCH:  mentation appears normal and affect normal  RESPIRATORY: No increased work of breathing.          left LOWER EXTREMITY EXAM:  Gait: tberg, slight favors the left.  Intact sensation deep peroneal nerve, superficial peroneal nerve, med/lat tibial nerve, sural nerve, saphenous nerve  Intact EHL, EDL, TA, FHL, GS, quadriceps hamstrings and hip flexors  Mild left quadriceps weakness.  calf soft and nttp or squeeze.  Edema: 1+    Wound / Incision: multiple surgical wounds healed well. Dry. No drainage. no erythema.  Inspection: no ecchymosis or notable swelling.  Tender to palpation gluteals, greater trochanter, iliotibial band from hip to the knee.  Nontender to palpation groin.  Full, pain-free hip range of motion.     No knee effusion  Positive patello-femoral crepitus and grind tests.    Strength: grossly intact, weak.   No discomfort with hip range of motion.      X-RAY:  AP pelvis,  2 views left hip/ femur from 2/26/2025 were reviewed in clinic today. Status post intramedullary ulysses and screw fixation across the proximal femoral diaphysis fracture. No hardware complication. Interval healing with increased osseous bridging. Otherwise unchanged.               Impression: 39 year old female 13 months status post IMN left femoral shaft fracture, with acute left hip pain, consistent with trochanteric bursitis/tendinitis.      Plan:     * " reviewed xrays with patient, hip and fracture alignment all healed without abnormality.    * discussed with patient that symptoms and exam consistent with trochanteric bursitis and iliotibial band tendinitis. Likely related to altered gait mechanics wearing fracture boots for foot fractures.    . Discussed various treatment options, including:    * Activity modification - avoid impact activities or activities that aggravate symptoms.  * NSAIDS - regular use for inflammation ( twice daily or three times daily), with food, as long as no contra-indications Please discuss with pcp if needed  * Stretching of iliotibial band.  * PT in the future as needed for strengthening, stretching and range of motion exercises, especially stretching of iliotibial band. Modalities as indicated.  * Tylenol as needed for pain  * avoid laying on affected side.  * ice/ heat as needed.  * Injections: consider in future as needed.  * Return to clinic as needed.    Workability: ok to return to work without restrictions.    * All questions were addressed and answered prior to discharge from clinic today. The patient acknowledges an understanding of and agreement with the plan set forth during today's visit. Patient was advised to call our office or MyChart us if any further questions arise upon leaving our office today.        Jimmie Nino M.D., M.S.  Dept. of Orthopaedic Surgery  [facility]             Again, thank you for allowing me to participate in the care of your patient.        Sincerely,        Jimmie Nino MD    Electronically signed

## 2025-02-26 NOTE — TELEPHONE ENCOUNTER
"  Medication Question or Refill        What medication are you calling about (include dose and sig)?: topiramate 25mg tab    Preferred Pharmacy:     [facility] [address]  [address] [address] [address]  Phone: [phone] Fax: [phone]      Controlled Substance Agreement on file:   CSA -- Patient Level:    CSA: None found at the patient level.       Who prescribed the medication?: Jaleesa    Do you need a refill? No    Do you have any questions or concerns?  Yes: fax received from the pharmacy with the following message \"Please clarify this prescription. Is pt taking both topiramate 50mg daily and topiramate 25mg twice daily? Or did the 50mg dose replace this one? Thanks!\"          "

## 2025-03-04 NOTE — PROGRESS NOTES
2/24/25    CC: CKD Stage 3, HTN    HPI: Divina Delgadillo is a 39 year old female who presents for follow-up of CKD. To review, her hx is significant for diabetes (~ 20 years), bipolar disease - was on lithium therapy for around 6 years but since our initial visit, she has since been taken off of it. Her diabetes was previously very poorly controlled  9% in Nov 2020, now 7.8% in October 2021. Creatinine has been 1.20-1.35 in the past year; stable at 1.35  today. Her last UPCR was 0.14 g/g in December of 2020 - she is taking 5 mg of lisinopril currently.     2019 visit: Divina presents for routine follow-up today.  Her creatinine has been 1.23-3.47 in the past year.  Her baseline seems to be someplace between 1.2 and 1.6 and she is stable at 1.41 at this time.  At her last visit she was very motivated to exercise and have been attending the gym regularly.  Unfortunately she has gotten away from the exercise to some degree but is still motivated to continue to make healthy lifestyle changes.  Her blood pressure is well controlled and she has no swelling concerns.  She is not using NSAIDs.    12/15/20: video visit. Creatinine is stable at this time. 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 Some back pain in the AM -she attributes this to her bed. BP has been good at recent visit. No lightheadedness unless blood sugar is low. No NSAIDs. Eating and drinking well. She had her teeth pulled at the end of May. She is getting new teeth in January so she is excited about that.     12/20/21: Divina is presenting for routine follow-up today. Her creatinine has been 1.20-1.35 over the last year. Her baseline seems to be 1.20-1.40, stable at 1.35 today. At her last visit (virtual) she was doing well with her diabetes (diet and exercise). Still enjoys working at the [employer]. No new concerns today, overall doing well. Was able to quit smoking for two months, but did start again. Plans to  stop in the future, encouraged her to continue with the cessation.    12/19/22: in person visit. Creatinine has been 1.06-1.37; stable at 1.24 at this time. No proteinuria on her check from Dec 2021. She quit smoking this past July - congratulated her on this success. BP high today but otherwise has been 120-69. She denies difficulty with UTIs. On ROS she reports headache episodes that last for a few hours - this has been going on for a couple of months - no vision changes but she notices being in the dark helps them. She has follow-up with primary coming up to discuss this further. No difficulty with yeast infections. BP high today but was 120-69 on Dec 5th visit.     08/22/23: video visit. She had a CT scan of the brain and found a malformation. Causing migraine headaches and dizzy spells. She had a fall in the kidney in July. She is eating/drinking ok. Some diarrhea - she has anywhere from 0-multiple. With the loose stools, could be dehydrated. She got a new job not too long ago - she was drinking a lot of water with work. She is not working now. She is still drinking a lot of water. BP recently in clinic was 110/64. She reports some lightheadedness at times now. She was going to get anesthesia for the MRI but it was canceled because of kidney function - this was at [place]. It is rescheduled for Sept 18th. No NSAIDs. Topamax was added for migraine prevention. Nothing OTC. No nasal treatment.     10/2/23: in person visit. GFR is improved to 49 now from 37 in August. On ROS she reports dizziness. No home BP readings. She has swelling that is worse through the day. BP is high today but 131 recently.     12/11/23: in person visit. Sleep study planned in march. No NSAID use. She is not on lisinopril. She has a HHN that comes out every few weeks - reports that pressures have been greater than 130 when checked at home. She is not restricting her sodium intake at home. Her main complaint today is sleep difficulties.  She has discussed with her PCP - has follow-up planned with psychiatry tomorrow.     2/24/25: in person visit. Present with her significant other today. Recently fractured her right foot after helping a friend move. Now with bilateral boots in place. Using insulin pump. Had labs done 2/10/25 and noted to have hyperkalemia.     Current Outpatient Medications   Medication Sig Dispense Refill    ACCU-CHEK GUIDE test strip Use to test blood sugar 3 times daily or as directed. 150 strip 1    acetaminophen (TYLENOL) 500 MG tablet Take 1-2 tablets (500-1,000 mg) by mouth every 6 hours as needed for mild pain. (Patient not taking: Reported on 2/28/2025) 40 tablet 0    acetaminophen (TYLENOL) 500 MG tablet Take 1-2 tablets (500-1,000 mg) by mouth every 8 hours as needed for mild pain 30 tablet 0    albuterol (VENTOLIN HFA) 108 (90 Base) MCG/ACT inhaler INHALE 2 PUFFS INTO THE LUNGS EVERY SIX  HOURS AS NEEDED FOR SHORTNESS OF BREATH 18 g 5    alendronate (FOSAMAX) 70 MG tablet Take 1 tablet (70 mg) by mouth every 7 days (Patient not taking: Reported on 2/28/2025) 4 tablet 2    amitriptyline (ELAVIL) 10 MG tablet Take 1 tablet (10 mg) by mouth at bedtime. 30 tablet 0    ARIPiprazole (ABILIFY) 15 MG tablet Take 1 Tablet (15 mg) by mouth once daily in the evening.      ARIPiprazole (ABILIFY) 30 MG tablet Take 30 mg by mouth daily.      atorvastatin (LIPITOR) 20 MG tablet Take 1 tablet (20 mg) by mouth daily 90 tablet 3    Biotin 5 MG TABS Take 1 tablet by mouth daily 100 tablet 1    blood glucose (ACCU-CHEK GUIDE) test strip Use to test blood sugar 3 times daily or as directed. 100 strip 11    bumetanide (BUMEX) 0.5 MG tablet Take 1 tablet (0.5 mg) by mouth daily 30 tablet 2    carvedilol (COREG) 12.5 MG tablet Take 1 tablet (12.5 mg) by mouth 2 times daily 60 tablet 11    cloZAPine (CLOZARIL) 50 MG tablet Take 150 mg by mouth at bedtime      diclofenac (VOLTAREN) 1 % topical gel Apply 2 g topically 4 times daily (Patient not  "taking: Reported on 2/28/2025) 150 g 1    docusate sodium (COLACE) 100 MG capsule Take 1 capsule (100 mg) by mouth 2 times daily as needed for constipation. 60 capsule 5    FLUoxetine (PROZAC) 40 MG capsule Take 40 mg by mouth daily      glucose (BD GLUCOSE) 4 g chewable tablet Take 1 tablet by mouth every hour as needed for low blood sugar 30 tablet 4    hydrOXYzine HCl (ATARAX) 25 MG tablet Take 25 mg by mouth 3 times daily as needed for anxiety      insulin aspart (NOVOLOG VIAL) 100 UNITS/ML vial for use with continuous insulin pump  Max TDD 80 150 mL 1    Insulin Infusion Pump (MINIMED 780G INSULIN PUMP) KIT 1 each daily SmartGuard Target: 100; Active Insulin Time: 2 hours (recommended); SmartGuardTM Warmup/Manual Mode: Suspend before low (recommended) 1 kit 0    Insulin Infusion Pump Supplies (EXTENDED INFUSION SET 23\"/6MM) MISC 1 each every 7 days Max Total Daily Dose 70 units; Change infusion set & reservoir every 7 days (recommended) 10 each 6    Insulin Infusion Pump Supplies (EXTENDED RESERVOIR 3ML) MISC 1 each every 7 days 10 each 11    lamoTRIgine (LAMICTAL) 100 MG tablet Take 100 mg by mouth At Bedtime      loratadine (CLARITIN) 10 MG tablet Take 1 tablet (10 mg) by mouth every morning 30 tablet 11    meclizine (ANTIVERT) 25 MG tablet Take 1 tablet (25 mg) by mouth 3 times daily as needed for dizziness (Patient not taking: Reported on 2/28/2025) 30 tablet 0    nicotine (NICODERM CQ) 14 MG/24HR 24 hr patch Place 1 patch over 24 hours onto the skin every 24 hours. (Patient not taking: Reported on 2/28/2025) 7 patch 0    nicotine (NICODERM CQ) 21 MG/24HR 24 hr patch Place 1 patch over 24 hours onto the skin every 24 hours. 21 mg for about 2 weeks, then decrease to 14 mg for about 7 days and then decrease to 7 mg for up to 7 days and stop (Patient not taking: Reported on 2/28/2025) 14 patch 0    omeprazole (PRILOSEC) 20 MG DR capsule TAKE 1 CAPSULE BY MOUTH EVERY DAY 90 capsule 2    oxyCODONE (ROXICODONE) 5 " MG tablet Take 1 tablet (5 mg) by mouth every 6 hours as needed. (Patient not taking: Reported on 2/28/2025) 12 tablet 0    Suvorexant (BELSOMRA) 10 MG tablet Take 10 mg by mouth at bedtime      HYDROcodone-acetaminophen (NORCO) 5-325 MG tablet Take 1 tablet by mouth every 6 hours as needed for pain. 20 tablet 0    nicotine (NICODERM CQ) 7 MG/24HR 24 hr patch Place 1 patch over 24 hours onto the skin every 24 hours. (Patient not taking: Reported on 2/28/2025) 7 patch 0    nitroGLYcerin 0.2% ointment Apply 1 click (0.25 g) topically every 24 hours for 14 days 4 g 0    topiramate (TOPAMAX) 25 MG tablet Take 1 tablet (25 mg) by mouth 2 times daily. 60 tablet 2     No current facility-administered medications for this visit.     Exam:  /69 (BP Location: Left arm, Patient Position: Chair, Cuff Size: Adult Regular)   Pulse 87   SpO2 (!) 91%   GENERAL: Healthy, alert and no distress  Ext - boots bilaterally - we removed the left boot and no significant pitting edema was appreciated.       Results:  Office Visit on 02/24/2025   Component Date Value Ref Range Status    Sodium 02/24/2025 142  135 - 145 mmol/L Final    Potassium 02/24/2025 4.7  3.4 - 5.3 mmol/L Final    Chloride 02/24/2025 107  98 - 107 mmol/L Final    Carbon Dioxide (CO2) 02/24/2025 25  22 - 29 mmol/L Final    Anion Gap 02/24/2025 10  7 - 15 mmol/L Final    Urea Nitrogen 02/24/2025 36.3 (H)  6.0 - 20.0 mg/dL Final    Creatinine 02/24/2025 1.95 (H)  0.51 - 0.95 mg/dL Final    GFR Estimate 02/24/2025 33 (L)  >60 mL/min/1.73m2 Final    eGFR calculated using 2021 CKD-EPI equation.    Calcium 02/24/2025 9.0  8.8 - 10.4 mg/dL Final    Glucose 02/24/2025 252 (H)  70 - 99 mg/dL Final           Assessment/Plan:   1. CKD Stage 3b: risk factors for kidney disease include longstanding hypertension, diabetes, as well as longterm previous lithium use. She is now away from lithium which is great to see given she is already at risk for diabetic nephropathy and would  like to minimize risk.  Given no proteinuria, will hold lisinopril given hemodynamic/KIRSTIE/hyperkalemic events that have occurred while on low dose ACE-I.     2. DM: Last hemoglobin A1C 8.1%  - stress the importance of good diabetes control.     3. Bipolar: now off of lithium    4. GERD: ideally would avoid PPI therapy given increased risk of incident CKD and AIN noted with PPI therapy. Tolerating once daily dosing - I am not certain we will be able to get away from PPI completely given the severity of her sxs previously.     5. Hypertension/swelling: at goal of <130/80 - holding lisinopril given hyperkalemia. Normal urine microalbumin/cr most recently.     6. Leukocytosis: hematology referral - has been referred previously but never arranged.     7. Hyperkalemia - repeat lab today to assure normal. I feel the risk of hyperkalemia with her lisinopril currently outweighs the benefit. No albuminuria on last check so will hold for now.     8. Anemia: hemoglobin 11 - iron saturation 19%. Ideally would take an iron supplement - not discussed at visit - will send result note.     Patient Instructions   Stop lisinopril  Repeat lab today after your visit.   Hematology referral  Labs in one month - at that time do the urine sample.   6 month follow-up.        Sánchez Dias, DO

## 2025-03-10 ENCOUNTER — VIRTUAL VISIT (OUTPATIENT)
Dept: GASTROENTEROLOGY | Facility: CLINIC | Age: 40
End: 2025-03-10
Attending: PHYSICIAN ASSISTANT
Payer: COMMERCIAL

## 2025-03-10 ENCOUNTER — LAB (OUTPATIENT)
Dept: LAB | Facility: CLINIC | Age: 40
End: 2025-03-10
Payer: COMMERCIAL

## 2025-03-10 DIAGNOSIS — R74.8 ALKALINE PHOSPHATASE ELEVATION: ICD-10-CM

## 2025-03-10 DIAGNOSIS — E11.22 TYPE 2 DIABETES MELLITUS WITH STAGE 3B CHRONIC KIDNEY DISEASE, WITH LONG-TERM CURRENT USE OF INSULIN (H): ICD-10-CM

## 2025-03-10 DIAGNOSIS — K76.0 HEPATIC STEATOSIS: Primary | ICD-10-CM

## 2025-03-10 DIAGNOSIS — N18.31 STAGE 3A CHRONIC KIDNEY DISEASE (H): ICD-10-CM

## 2025-03-10 DIAGNOSIS — K74.00 HEPATIC FIBROSIS: ICD-10-CM

## 2025-03-10 DIAGNOSIS — Z87.898 HISTORY OF ALCOHOL USE: ICD-10-CM

## 2025-03-10 DIAGNOSIS — K76.0 METABOLIC DYSFUNCTION-ASSOCIATED STEATOTIC LIVER DISEASE (MASLD): ICD-10-CM

## 2025-03-10 DIAGNOSIS — F25.0 SCHIZOAFFECTIVE DISORDER, BIPOLAR TYPE (H): ICD-10-CM

## 2025-03-10 DIAGNOSIS — Z11.59 ENCOUNTER FOR SCREENING FOR OTHER VIRAL DISEASES: ICD-10-CM

## 2025-03-10 DIAGNOSIS — K76.0 HEPATIC STEATOSIS: ICD-10-CM

## 2025-03-10 DIAGNOSIS — Z79.4 TYPE 2 DIABETES MELLITUS WITH STAGE 3B CHRONIC KIDNEY DISEASE, WITH LONG-TERM CURRENT USE OF INSULIN (H): ICD-10-CM

## 2025-03-10 DIAGNOSIS — R11.0 NAUSEA: ICD-10-CM

## 2025-03-10 DIAGNOSIS — R10.9 ABDOMINAL DISCOMFORT: ICD-10-CM

## 2025-03-10 DIAGNOSIS — N18.32 TYPE 2 DIABETES MELLITUS WITH STAGE 3B CHRONIC KIDNEY DISEASE, WITH LONG-TERM CURRENT USE OF INSULIN (H): ICD-10-CM

## 2025-03-10 DIAGNOSIS — Z79.899 HIGH RISK MEDICATION USE: ICD-10-CM

## 2025-03-10 DIAGNOSIS — R77.0 LOW SERUM ALBUMIN: ICD-10-CM

## 2025-03-10 DIAGNOSIS — R11.10 DRY HEAVES: ICD-10-CM

## 2025-03-10 LAB
ALBUMIN MFR UR ELPH: <6 MG/DL
ALBUMIN UR-MCNC: NEGATIVE MG/DL
ANION GAP SERPL CALCULATED.3IONS-SCNC: 10 MMOL/L (ref 7–15)
APPEARANCE UR: CLEAR
BACTERIA #/AREA URNS HPF: ABNORMAL /HPF
BASOPHILS # BLD AUTO: 0.1 10E3/UL (ref 0–0.2)
BASOPHILS NFR BLD AUTO: 1 %
BILIRUB UR QL STRIP: NEGATIVE
BUN SERPL-MCNC: 47.1 MG/DL (ref 6–20)
CALCIUM SERPL-MCNC: 10.4 MG/DL (ref 8.8–10.4)
CHLORIDE SERPL-SCNC: 106 MMOL/L (ref 98–107)
COLOR UR AUTO: YELLOW
CREAT SERPL-MCNC: 1.93 MG/DL (ref 0.51–0.95)
CREAT UR-MCNC: 16.9 MG/DL
EGFRCR SERPLBLD CKD-EPI 2021: 33 ML/MIN/1.73M2
EOSINOPHIL # BLD AUTO: 0.3 10E3/UL (ref 0–0.7)
EOSINOPHIL NFR BLD AUTO: 2 %
ERYTHROCYTE [DISTWIDTH] IN BLOOD BY AUTOMATED COUNT: 14.5 % (ref 10–15)
EST. AVERAGE GLUCOSE BLD GHB EST-MCNC: 180 MG/DL
GLUCOSE SERPL-MCNC: 148 MG/DL (ref 70–99)
GLUCOSE UR STRIP-MCNC: NEGATIVE MG/DL
HBA1C MFR BLD: 7.9 % (ref 0–5.6)
HCO3 SERPL-SCNC: 25 MMOL/L (ref 22–29)
HCT VFR BLD AUTO: 40.1 % (ref 35–47)
HGB BLD-MCNC: 11.8 G/DL (ref 11.7–15.7)
HGB UR QL STRIP: NEGATIVE
IMM GRANULOCYTES # BLD: 0 10E3/UL
IMM GRANULOCYTES NFR BLD: 0 %
KETONES UR STRIP-MCNC: NEGATIVE MG/DL
LEUKOCYTE ESTERASE UR QL STRIP: NEGATIVE
LYMPHOCYTES # BLD AUTO: 2.9 10E3/UL (ref 0.8–5.3)
LYMPHOCYTES NFR BLD AUTO: 23 %
MCH RBC QN AUTO: 28.8 PG (ref 26.5–33)
MCHC RBC AUTO-ENTMCNC: 29.4 G/DL (ref 31.5–36.5)
MCV RBC AUTO: 98 FL (ref 78–100)
MONOCYTES # BLD AUTO: 0.8 10E3/UL (ref 0–1.3)
MONOCYTES NFR BLD AUTO: 6 %
NEUTROPHILS # BLD AUTO: 8.8 10E3/UL (ref 1.6–8.3)
NEUTROPHILS NFR BLD AUTO: 69 %
NITRATE UR QL: NEGATIVE
PH UR STRIP: 5.5 [PH] (ref 5–7)
PLATELET # BLD AUTO: 461 10E3/UL (ref 150–450)
POTASSIUM SERPL-SCNC: 5.8 MMOL/L (ref 3.4–5.3)
PROT/CREAT 24H UR: NORMAL MG/G{CREAT}
RBC # BLD AUTO: 4.1 10E6/UL (ref 3.8–5.2)
RBC #/AREA URNS AUTO: ABNORMAL /HPF
SODIUM SERPL-SCNC: 141 MMOL/L (ref 135–145)
SP GR UR STRIP: 1.01 (ref 1–1.03)
SQUAMOUS #/AREA URNS AUTO: ABNORMAL /LPF
UROBILINOGEN UR STRIP-ACNC: 0.2 E.U./DL
WBC # BLD AUTO: 12.8 10E3/UL (ref 4–11)
WBC #/AREA URNS AUTO: ABNORMAL /HPF

## 2025-03-10 PROCEDURE — 80048 BASIC METABOLIC PNL TOTAL CA: CPT

## 2025-03-10 PROCEDURE — 1125F AMNT PAIN NOTED PAIN PRSNT: CPT | Performed by: PHYSICIAN ASSISTANT

## 2025-03-10 PROCEDURE — 36415 COLL VENOUS BLD VENIPUNCTURE: CPT

## 2025-03-10 PROCEDURE — 81001 URINALYSIS AUTO W/SCOPE: CPT

## 2025-03-10 PROCEDURE — 83036 HEMOGLOBIN GLYCOSYLATED A1C: CPT

## 2025-03-10 PROCEDURE — 85025 COMPLETE CBC W/AUTO DIFF WBC: CPT

## 2025-03-10 PROCEDURE — 84075 ASSAY ALKALINE PHOSPHATASE: CPT | Mod: 90

## 2025-03-10 PROCEDURE — 84156 ASSAY OF PROTEIN URINE: CPT

## 2025-03-10 PROCEDURE — 99000 SPECIMEN HANDLING OFFICE-LAB: CPT

## 2025-03-10 PROCEDURE — 99417 PROLNG OP E/M EACH 15 MIN: CPT | Performed by: PHYSICIAN ASSISTANT

## 2025-03-10 PROCEDURE — 84080 ASSAY ALKALINE PHOSPHATASES: CPT | Mod: 90

## 2025-03-10 PROCEDURE — 98007 SYNCH AUDIO-VIDEO EST HI 40: CPT | Mod: 24 | Performed by: PHYSICIAN ASSISTANT

## 2025-03-10 PROCEDURE — 82105 ALPHA-FETOPROTEIN SERUM: CPT | Mod: 90

## 2025-03-10 RX ORDER — QUETIAPINE FUMARATE 25 MG/1
TABLET, FILM COATED ORAL
COMMUNITY
Start: 2024-05-16

## 2025-03-10 NOTE — LETTER
3/10/2025      Divina Delgadillo  [address] [address]  [facility] [address]      Dear Colleague,    Thank you for referring your patient, Divina Delgadillo, to the [facility] [place]. Please see a copy of my visit note below.    Hepatology Clinic Note  Divina Delgadillo   Date of Birth 1985    REASON FOR FOLLOW UP: abnormal liver tests   REFERRING PROVIDER: Dr. Leventhal    Virtual Visit Details  Type of service:  Video Visit   Video Start Time: 4:02 PM  Video End Time: 4:40 PM  Originating Location (pt. Location): Home  Distant Location (provider location):  Off-site  Platform used for Video Visit: WoowUp    Total time for E/M services performed on the date of the encounter 69 minutes.  This included review of previous: clinic visits, hospital records, lab results, imaging studies, and procedural documentation. Time also includes patient visit, documentation and discussion with other providers.  The findings from this review are summarized in the above note.          Assessment/plan:     Abnormal imaging of liver:  -CT abd/pelvis w contrast 2/3/25: Heterogeneous hepatic parenchyma with mildly lobulated contour, worrisome for underlying cirrhosis. Tiny arterial phase enhancement in R hepatic lobe inferiorly not appreciable. Patent hepatic/portal veins  CT abd w/o w contrast 12/23/24: Slightly lobulated contour of liver. 4 mm hypervascular focus in inferior right hepatic lobe. No definite washout, but given small size difficult to characterize. Not definitely seen on previous exam and no evidence for capsule. Presuming cirrhosis present, this would be LI-RADS 3. No other enhancing lesions in liver.  -US elastography 11/20/24: median liver stiffness 1.59 m/sec and IQR/median value 0.23. Technically unreliable data set. Second attempt made which also resulted in unreliable data set  -Noted liver biopsy from 2019 without significant degree of hepatic fibrosis  -No hx of abdominal  "ascites, jaundice, hepatic encephalopathy or varices  -Recommended not exceeding 2000 mg tylenol in 24 hr period   -Monitor and MELD labs and monitor for evidence of hepatic decompensation    Hx of heavy alcohol use:  -Sober since 2022; has completed several inpatient and outpatient chemical dependency tx programs in the past  -Plans to maintain sobriety   -Encouraged continued abstinence from all alcohol use     Abnormal Liver Tests:    -Recent lab evidence to support potential advanced hepatic fibrosis/cirrhosis: hypoalbuminemia & mildly elevated alkaline phosphatase   -In the past, it was felt that abnormal liver tests likely multifactorial: metabolic associated steatotic liver disease & potential contribution of clozapine (which pt is still on)  - Follows routinely with Dr. Ross, Psychiatrist, [facility]; Dr. Leventhal previously mentioned \"I do believe that well controlled mental health is paramount, even noting risks of ongoing liver irritation\"   > With normal LFTs at this time, no recommendations to change her current psych meds at this time     Hx of known metabolic associated Steatotic Liver Disease   - Known risk factos for MALFD: DM, HTN, dyslipidemia, obesity  - Discussed metabolic associated steatotic liver disease    - Discussed management of MALFD:    - Exercise regularly as tolerated              - Patients who exercise regularly can have improvement of insulin resistance, increase in baseline metabolic activity and resolution of fatty liver disease (even if  appreciable weight loss does not occur)    - Recommend a low-carbohydrate, low-calorie diet  - An ideal weight loss plan would be to lose 7-10% of body wt over the next six months.  This has been proven to resolve fat and inflammation in the liver, and can lead to regression of scarring that might be present  - If pt has diabetes, assertive management is key: goal Hgb A1c goal of =/< 7%.     - No contraindications to meds " used in tx of diabetes in pts w/ chronic liver disease  - Management of cholesterol is also very important.    - Use of statins are an effective means of therapy and are not contra-indicated in those with abnormal liver tests OR those with cirrhosis.Value of these medications in this population far outweigh minor risks of abnormal liver tests  - Goal LDL in those with SAHNI are < 100 mg/dL  - Consider utility of liberalizing coffee consumption as some data that this may slow progression and reverse effects of SAHNI-related fibrosis.  - Consider referral to [facility]    -Complete additional labs (AFP, PETH, alk phos isoenzymes, INR) and fibroscan and then follow up virtually afterwards   > If there is evidence of F3/F4 fibrosis, plan for EGD to screen for varices and would do HCC screening every 6 mo  -Referral placed to general GI for nausea, dry heaves, abdominal discomfort    The longitudinal plan of care for the diagnosis(es)/condition(s) as documented were addressed during this visit. Due to the added complexity in care, I will continue to support Divina in the subsequent management and with ongoing continuity of care.    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     She was last seen in the Hepatology clinic by Dr. Leventhal 10/26/23.     Patient denies any recent fevers or chills.  She denies vomiting.  Does admit to occasional dry heaves and nausea which typically occur in the mornings.  Patient states this has been ongoing for about a year.  She does have a history of GERD but tells me she is on medication  "that controls her GERD symptoms well.  Patient sometimes finds it difficult to swallow her own saliva.  Denies pain with swallowing.  No yellowing of her eyes or skin.  Passes stool daily.  No bright red blood per rectum or melena.  Patient reports that her appetite is \"weird \".  Appetite tends to vary.  Patient mainly drinks water.  Occasionally drinks soda, sparkling water or tea.  Has never vomited blood.  Denies any recent confusion.  Gets mid abdominal discomfort that comes and goes and feels sharp.  Patient states the pain is usually severe for a few minutes and then goes away.  Patient states she \"keeps gaining weight \".  Patient states her current weight is 240 LBS.  Patient states she is currently unable to exercise.  Patient states she has a history of breaking both of her feet.  Patient states she is currently into boots.  She believes she had an EGD once in the past.    Does admit to having an insulin pump.  Patient states if she does not take her Norco, she tends to take Tylenol for pain.  As of late patient states she has been taking Tylenol a few times per week due to right foot pain.  Patient states the amount of Tylenol she takes depends.    Patient tells me that she remains on clozapine and that this medication is her main psych med.  Patient admits to history of schizophrenia.    Admits to current vaping and cigarette use.  Smokes 2-3 cigarettes/day.  Denies illicit drug use.  Patient states she has been sober from alcohol for 2.5 years.  She does admit to a history of alcohol abuse in the past.  Patient states she has completed both inpatient and outpatient, malignancy treatment program in the past.  Denies history of legal issues secondary to alcohol use.    Procedures:  Liver biopsy: 11/14/2019  FINAL DIAGNOSIS:   LIVER, NEEDLE BIOPSY:   - Mild ductopenia with no evidence of active parenchymal or active biliary    tract disease.    MICROSCOPIC:   - Core needle biopsies are adequate for " evaluation and show liver parenchyma with approximately 18 portal tracts.    - H&E sections show hepatic lobules with minimal steatosis (<5%)   and no ballooning degeneration, inflammation, or cholestasis.   - The portal tracts are remarkable for ductopenia, with absent bile ducts in   approximately 5 of the portal tracts (highlighted with CK7   immunohistochemistry), along with ceroid-laden macrophages.    - CK7 also stains periportal hepatocytes in areas of ductopenia.  The portal tracts do not contain significant inflammation or show interface activity.  - Ductular proliferation is not a prominent feature.   Florid duct lesions are not present.  There is no evidence of significant   fibrosis on trichrome stain.   -Special stain for reticulin shows preserved liver plate architecture.     The main finding in this biopsy is the presence of mild ductopenia,   without evidence of active duct injury.   An underlying cause for the ductopenia is not apparent in this biopsy, but    may have been secondary to an episode of prior bile duct injury (e.g. drug reaction) or idiopathic adult ductopenia.    There is only minimal steatosis in this biopsy and there is no evidence of steatohepatitis or significant fibrosis.     PMH:    has a past medical history of Acquired asplenia, Acute pain of left knee (03/22/2019), Acute-on-chronic kidney injury (03/22/2019), ARF (acute renal failure) (03/23/2019), Asthma, Bipolar disorder (H), Cardiac murmur, Cervical high risk HPV (human papillomavirus) test positive (06/20/2017), Chiari malformation type I (H), Chronic bilateral low back pain without sciatica, Deep venous thrombosis of arm (H) (01/06/2017), Depressive disorder, DVT (deep venous thrombosis) (H), DVT of upper extremity (deep vein thrombosis) (H) (2021), Esophageal reflux, Hypertension, Insomnia, Leukocytosis, Mild intermittent asthma, Morbid obesity (H), Mucinous neoplasm of pancreas, NAFL (nonalcoholic fatty liver), Neck  pain, Nicotine abuse, Other specified types of schizophrenia, unspecified condition, Schizoaffective disorder, depressive type (H), Stage 3a chronic kidney disease (CKD) (H), Type 2 diabetes mellitus (H), Ulnar neuropathy of both upper extremities, and Vitamin D insufficiency.     SMH:    has a past surgical history that includes ENT surgery (10/01/2000); tonsillectomy (10/01/2000); splenectomy (2015); Adrenalectomy (Left, 2015); Pancreatectomy partial (2015); Percutaneous biopsy liver (Right, 11/14/2019); IR Liver Biopsy Percutaneous (11/14/2019); biopsy; Breast surgery (2013); and colonoscopy.     Medications:   Current Outpatient Medications   Medication Sig Dispense Refill     FLUoxetine (PROZAC) 20 MG capsule Take 3 Capsules (60 mg) by mouth once daily in the morning.       QUEtiapine (SEROQUEL) 25 MG tablet TAKE ONE TABLET BY MOUTH FOUR TIMES DAILY AS NEEDED FOR AGITATION OR HALLUCINATIONS       ACCU-CHEK GUIDE test strip Use to test blood sugar 3 times daily or as directed. 150 strip 1     acetaminophen (TYLENOL) 500 MG tablet Take 1-2 tablets (500-1,000 mg) by mouth every 6 hours as needed for mild pain. (Patient not taking: Reported on 2/28/2025) 40 tablet 0     albuterol (VENTOLIN HFA) 108 (90 Base) MCG/ACT inhaler INHALE 2 PUFFS INTO THE LUNGS EVERY SIX  HOURS AS NEEDED FOR SHORTNESS OF BREATH 18 g 5     alendronate (FOSAMAX) 70 MG tablet Take 1 tablet (70 mg) by mouth every 7 days (Patient not taking: Reported on 2/28/2025) 4 tablet 2     amitriptyline (ELAVIL) 10 MG tablet Take 1 tablet (10 mg) by mouth at bedtime. 30 tablet 0     ARIPiprazole (ABILIFY) 15 MG tablet Take 1 Tablet (15 mg) by mouth once daily in the evening.       atorvastatin (LIPITOR) 20 MG tablet Take 1 tablet (20 mg) by mouth daily 90 tablet 3     Biotin 5 MG TABS Take 1 tablet by mouth daily 100 tablet 1     blood glucose (ACCU-CHEK GUIDE) test strip Use to test blood sugar 3 times daily or as directed. 100 strip 11     bumetanide  "(BUMEX) 0.5 MG tablet Take 1 tablet (0.5 mg) by mouth daily 30 tablet 2     carvedilol (COREG) 12.5 MG tablet Take 1 tablet (12.5 mg) by mouth 2 times daily 60 tablet 11     cloZAPine (CLOZARIL) 50 MG tablet Take 150 mg by mouth at bedtime       diclofenac (VOLTAREN) 1 % topical gel Apply 2 g topically 4 times daily (Patient not taking: Reported on 2/28/2025) 150 g 1     docusate sodium (COLACE) 100 MG capsule Take 1 capsule (100 mg) by mouth 2 times daily as needed for constipation. 60 capsule 5     glucose (BD GLUCOSE) 4 g chewable tablet Take 1 tablet by mouth every hour as needed for low blood sugar 30 tablet 4     HYDROcodone-acetaminophen (NORCO) 5-325 MG tablet Take 1 tablet by mouth every 6 hours as needed for pain. 20 tablet 0     hydrOXYzine HCl (ATARAX) 25 MG tablet Take 25 mg by mouth 3 times daily as needed for anxiety       insulin aspart (NOVOLOG VIAL) 100 UNITS/ML vial for use with continuous insulin pump  Max TDD 80 150 mL 1     Insulin Infusion Pump (MINIMED 780G INSULIN PUMP) KIT 1 each daily SmartGuard Target: 100; Active Insulin Time: 2 hours (recommended); SmartGuardTM Warmup/Manual Mode: Suspend before low (recommended) 1 kit 0     Insulin Infusion Pump Supplies (EXTENDED INFUSION SET 23\"/6MM) MISC 1 each every 7 days Max Total Daily Dose 70 units; Change infusion set & reservoir every 7 days (recommended) 10 each 6     Insulin Infusion Pump Supplies (EXTENDED RESERVOIR 3ML) MISC 1 each every 7 days 10 each 11     lamoTRIgine (LAMICTAL) 100 MG tablet Take 100 mg by mouth At Bedtime       loratadine (CLARITIN) 10 MG tablet Take 1 tablet (10 mg) by mouth every morning 30 tablet 11     meclizine (ANTIVERT) 25 MG tablet Take 1 tablet (25 mg) by mouth 3 times daily as needed for dizziness (Patient not taking: Reported on 2/28/2025) 30 tablet 0     nicotine (NICODERM CQ) 14 MG/24HR 24 hr patch Place 1 patch over 24 hours onto the skin every 24 hours. (Patient not taking: Reported on 2/28/2025) 7 " patch 0     nicotine (NICODERM CQ) 21 MG/24HR 24 hr patch Place 1 patch over 24 hours onto the skin every 24 hours. 21 mg for about 2 weeks, then decrease to 14 mg for about 7 days and then decrease to 7 mg for up to 7 days and stop (Patient not taking: Reported on 2/28/2025) 14 patch 0     nicotine (NICODERM CQ) 7 MG/24HR 24 hr patch Place 1 patch over 24 hours onto the skin every 24 hours. (Patient not taking: Reported on 2/28/2025) 7 patch 0     nitroGLYcerin 0.2% ointment Apply 1 click (0.25 g) topically every 24 hours for 14 days 4 g 0     omeprazole (PRILOSEC) 20 MG DR capsule TAKE 1 CAPSULE BY MOUTH EVERY DAY 90 capsule 2     Suvorexant (BELSOMRA) 10 MG tablet Take 10 mg by mouth at bedtime       topiramate (TOPAMAX) 25 MG tablet Take 1 tablet (25 mg) by mouth 2 times daily. 60 tablet 2     No current facility-administered medications for this visit.     Previous work-up:  Lab Results   Component Value Date    HEPBANG Nonreactive 05/26/2017    HCVAB  05/26/2017     Nonreactive   Assay performance characteristics have not been established for newborns,   infants, and children      GONZALEZ 25 02/10/2025    IRONSAT 19 02/10/2025    TTG 1.3 01/02/2023    TTGG 0.8 01/02/2023     01/02/2023    CER 25 08/15/2017    MITM2 <1 07/16/2019    TSH 0.84 08/02/2023    CHOL 190 07/30/2024    HDL 45 (L) 07/30/2024     (H) 07/30/2024     (H) 07/30/2024    TRIG 144 07/30/2024    A1C 7.9 (H) 03/10/2025      Recent Labs   Lab Test 02/02/25  2151 11/27/24  1142 10/29/24  1253 07/30/24  1408 08/02/23  1607 05/23/23  0304 01/11/23  1207 10/19/22  0739 07/11/22  1401 07/06/22  1406   ALKPHOS 187* 200* 213* 223* 197* 129* 172* 178* 176* 160*   ALT 33 27 26 35 68* 27 24 61* 61* 39   AST 35 20 22 21 33 19 20 34 36 28   BILITOTAL <0.2 0.2 <0.2 0.3 0.2 <0.2 0.2 0.2 0.2 0.2           Allergies:     Allergies   Allergen Reactions     Oxycodone-Acetaminophen Other (See Comments)     weston Bustillos          Social History:      Social History     Socioeconomic History     Marital status: Single     Spouse name: Not on file     Number of children: 0     Years of education: Not on file     Highest education level: Not on file   Occupational History     Not on file   Tobacco Use     Smoking status: Every Day     Current packs/day: 0.00     Average packs/day: 0.5 packs/day for 0.2 years (0.1 ttl pk-yrs)     Types: Cigarettes, Vaping Device     Start date: 2023     Last attempt to quit: 2024     Years since quittin.0     Passive exposure: Yes     Smokeless tobacco: Never     Tobacco comments:     E- cig and cigarettes (2-3 cigarettes per day)   Vaping Use     Vaping status: Every Day     Substances: Nicotine, Flavoring     Devices: Disposable   Substance and Sexual Activity     Alcohol use: No     Comment: sober since      Drug use: No     Sexual activity: Yes     Partners: Female     Birth control/protection: I.U.D.     Comment: Both male and female    Other Topics Concern     Parent/sibling w/ CABG, MI or angioplasty before 65F 55M? No   Social History Narrative     Not on file     Social Drivers of Health     Financial Resource Strain: Low Risk  (2025)    Financial Resource Strain      Within the past 12 months, have you or your family members you live with been unable to get utilities (heat, electricity) when it was really needed?: No   Food Insecurity: Low Risk  (2025)    Food Insecurity      Within the past 12 months, did you worry that your food would run out before you got money to buy more?: No      Within the past 12 months, did the food you bought just not last and you didn t have money to get more?: No   Transportation Needs: Low Risk  (2025)    Transportation Needs      Within the past 12 months, has lack of transportation kept you from medical appointments, getting your medicines, non-medical meetings or appointments, work, or from getting things that you need?: No   Physical Activity: Inactive  (1/2/2025)    Exercise Vital Sign      Days of Exercise per Week: 1 day      Minutes of Exercise per Session: 0 min   Stress: Stress Concern Present (1/2/2025)    [employer] [place] [employer] - Occupational Stress Questionnaire      Feeling of Stress : To some extent   Social Connections: Unknown (1/2/2025)    Social Connection and Isolation Panel [NHANES]      Frequency of Communication with Friends and Family: Not on file      Frequency of Social Gatherings with Friends and Family: More than three times a week      Attends Yarsanism Services: Not on file      Active Member of Clubs or Organizations: Not on file      Attends Club or Organization Meetings: Not on file      Marital Status: Not on file   Interpersonal Safety: Low Risk  (2/14/2025)    Interpersonal Safety      Do you feel physically and emotionally safe where you currently live?: Yes      Within the past 12 months, have you been hit, slapped, kicked or otherwise physically hurt by someone?: No      Within the past 12 months, have you been humiliated or emotionally abused in other ways by your partner or ex-partner?: No   Housing Stability: Low Risk  (1/2/2025)    Housing Stability      Do you have housing? : Yes      Are you worried about losing your housing?: No          Family History:     Family History   Problem Relation Age of Onset     Respiratory Mother         ASTHMA     Allergies Mother      Depression Mother      Chronic Obstructive Pulmonary Disease Mother      Anxiety Disorder Mother      Mental Illness Mother      Asthma Mother      Heart Disease Father         HEART VALVE REPLACEMENT     Hypertension Father      Diabetes Father      Depression Father      Anxiety Disorder Father      Mental Illness Father      Obesity Father      Respiratory Sister      Allergies Sister      Depression Sister      Respiratory Sister      Allergies Sister      Depression Sister      Respiratory Brother      Allergies Brother      Sleep Apnea  Maternal Grandfather      Kidney Disease No family hx of      Liver Disease No family hx of      Liver Cancer No family hx of           Review of Systems:   Gen: See HPI          Physical Exam:     Gen: A&Ox3, NAD  HEENT: Sclera anicteric  Skin: No jaundice  Neuro: grossly intact   Psych: appropriate mood and affects         Data:   Reviewed in person and significant for:    Lab Results   Component Value Date     02/24/2025     07/08/2021      Lab Results   Component Value Date    POTASSIUM 4.7 02/24/2025    POTASSIUM 5.1 12/19/2022    POTASSIUM 4.4 07/08/2021     Lab Results   Component Value Date    CHLORIDE 107 02/24/2025    CHLORIDE 112 12/19/2022    CHLORIDE 108 07/08/2021     Lab Results   Component Value Date    CO2 25 02/24/2025    CO2 26 12/19/2022    CO2 26 07/08/2021     Lab Results   Component Value Date    BUN 36.3 02/24/2025    BUN 19 12/19/2022    BUN 22 07/08/2021     Lab Results   Component Value Date    CR 1.95 02/24/2025    CR 1.32 07/08/2021       Lab Results   Component Value Date    WBC 12.8 03/10/2025    WBC 14.8 07/08/2021     Lab Results   Component Value Date    HGB 11.8 03/10/2025    HGB 12.9 07/08/2021     Lab Results   Component Value Date    HCT 40.1 03/10/2025    HCT 39.8 07/08/2021     Lab Results   Component Value Date    MCV 98 03/10/2025    MCV 95 07/08/2021     Lab Results   Component Value Date     03/10/2025     07/08/2021       Lab Results   Component Value Date    AST 35 02/02/2025    AST 38 07/08/2021     Lab Results   Component Value Date    ALT 33 02/02/2025    ALT 65 07/08/2021     Lab Results   Component Value Date    BILICONJ 0.0 06/23/2009      Lab Results   Component Value Date    BILITOTAL <0.2 02/02/2025    BILITOTAL 0.3 07/08/2021       Lab Results   Component Value Date    ALBUMIN 3.7 02/10/2025    ALBUMIN 3.0 12/19/2022    ALBUMIN 3.4 07/08/2021     Lab Results   Component Value Date    PROTTOTAL 7.2 02/02/2025    PROTTOTAL 7.9 07/08/2021       Lab Results   Component Value Date    ALKPHOS 187 02/02/2025    ALKPHOS 216 07/08/2021       Lab Results   Component Value Date    INR 0.93 07/25/2024    INR 0.91 07/08/2021         Imaging:      CT ABDOMEN PELVIS W CONTRAST  LOCATION: [facility]  DATE: 2/3/2025     INDICATION: Acute onset epigastric/RUQ pain and tenderness.  COMPARISON: CT abdomen and pelvis without/with IV contrast 12/23/2024.  TECHNIQUE: CT scan of the abdomen and pelvis was performed following injection of IV contrast. Multiplanar reformats were obtained. Dose reduction techniques were used.  CONTRAST: 100 mL Isovue 370.     FINDINGS:   LOWER CHEST: Mild elevation of the right hemidiaphragm. A few strands of platelike atelectasis in the right base. No pleural effusion on either side. Normal cardiac size. No pericardial effusion.     HEPATOBILIARY: Heterogeneous hepatic parenchyma with mildly lobulated contour, worrisome for underlying cirrhosis. Tiny arterial phase enhancement in the right hepatic lobe inferiorly is not appreciable on current study. Patent hepatic and portal veins.   The gallbladder is not well distended.     PANCREAS: Distal pancreatectomy. Normal pancreatic remnant.     SPLEEN: Surgically absent.     ADRENAL GLANDS: Left adrenalectomy. Normal right adrenal gland.     KIDNEYS/BLADDER: No urinary tract calculi. Both kidneys are well-perfused without hydronephrosis or hydroureter. Partially distended normal urinary bladder.     BOWEL: Normal appendix. Gas and stool material within normal caliber colon. No mechanical obstruction or free gas.     LYMPH NODES: No suspicious abdominopelvic adenopathy.     VASCULATURE: Normal caliber abdominal aorta and the IVC. Retroaortic left renal vein, normal vascular variant anatomy.     PELVIC ORGANS: Anteverted uterus containing an IUCD, well-seated. Right ovarian dominant follicle measures 2.3 cm (image 81, series 2). No adenopathy or free fluid. Normal  appendix.     MUSCULOSKELETAL: Anatomic alignment of the bones and joints. Intramedullary nail with proximal screws, partially visualized, unchanged. Tiny fat-containing ventral umbilical hernia.                                                                      IMPRESSION:   1.  The gallbladder is not well distended. No calcified gallstones, biliary dilatation or adjacent inflammation. Heterogeneous hepatic parenchyma with lobulated contour, worrisome for underlying cirrhosis. Tiny focal enhancement in the right hepatic lobe   inferiorly is not appreciable on current study.     2.  Distal pancreatectomy, left adrenalectomy and splenectomy. Normal pancreatic remnant.     3.  No mechanical bowel obstruction, free gas or free fluid. Normal appendix.     4.  No urinary tract calculi or obstruction. Anteverted uterus containing an IUCD, well-seated.     5.  Intramedullary nail with proximal screws, partially visualized, unchanged.      Again, thank you for allowing me to participate in the care of your patient.        Sincerely,        Ivelisse Sanchez PA-C    Electronically signed

## 2025-03-10 NOTE — NURSING NOTE
Current patient location: [address] [place] [id number]    Is the patient currently in the state of [place]? YES    Visit mode: VIDEO    If the visit is dropped, the patient can be reconnected by:VIDEO VISIT: Text to cell phone:   Telephone Information:   Mobile [phone]       Will anyone else be joining the visit? NO  (If patient encounters technical issues they should call [phone]    Are changes needed to the allergy or medication list? No    Are refills needed on medications prescribed by this physician? NO    Rooming Documentation:  Questionnaire(s) completed    Reason for visit: RECHECK    Jonathan GILL

## 2025-03-10 NOTE — PROGRESS NOTES
Hepatology Clinic Note  Divina Delgadillo   Date of Birth 1985    REASON FOR FOLLOW UP: abnormal liver tests   REFERRING PROVIDER: Dr. Leventhal    Virtual Visit Details  Type of service:  Video Visit   Video Start Time: 4:02 PM  Video End Time: 4:40 PM  Originating Location (pt. Location): Home  Distant Location (provider location):  Off-site  Platform used for Video Visit: [employer]    Total time for E/M services performed on the date of the encounter 69 minutes.  This included review of previous: clinic visits, hospital records, lab results, imaging studies, and procedural documentation. Time also includes patient visit, documentation and discussion with other providers.  The findings from this review are summarized in the above note.          Assessment/plan:     Abnormal imaging of liver:  -CT abd/pelvis w contrast 2/3/25: Heterogeneous hepatic parenchyma with mildly lobulated contour, worrisome for underlying cirrhosis. Tiny arterial phase enhancement in R hepatic lobe inferiorly not appreciable. Patent hepatic/portal veins  CT abd w/o w contrast 12/23/24: Slightly lobulated contour of liver. 4 mm hypervascular focus in inferior right hepatic lobe. No definite washout, but given small size difficult to characterize. Not definitely seen on previous exam and no evidence for capsule. Presuming cirrhosis present, this would be LI-RADS 3. No other enhancing lesions in liver.  -US elastography 11/20/24: median liver stiffness 1.59 m/sec and IQR/median value 0.23. Technically unreliable data set. Second attempt made which also resulted in unreliable data set  -Noted liver biopsy from 2019 without significant degree of hepatic fibrosis  -No hx of abdominal ascites, jaundice, hepatic encephalopathy or varices  -Recommended not exceeding 2000 mg tylenol in 24 hr period   -Monitor and MELD labs and monitor for evidence of hepatic decompensation    Hx of heavy alcohol use:  -Sober since 2022; has completed several  "inpatient and outpatient chemical dependency tx programs in the past  -Plans to maintain sobriety   -Encouraged continued abstinence from all alcohol use     Abnormal Liver Tests:    -Recent lab evidence to support potential advanced hepatic fibrosis/cirrhosis: hypoalbuminemia & mildly elevated alkaline phosphatase   -In the past, it was felt that abnormal liver tests likely multifactorial: metabolic associated steatotic liver disease & potential contribution of clozapine (which pt is still on)  - Follows routinely with Dr. Ross, Psychiatrist, [facility]; Dr. Leventhal previously mentioned \"I do believe that well controlled mental health is paramount, even noting risks of ongoing liver irritation\"   > With normal LFTs at this time, no recommendations to change her current psych meds at this time     Hx of known metabolic associated Steatotic Liver Disease   - Known risk factos for MALFD: DM, HTN, dyslipidemia, obesity  - Discussed metabolic associated steatotic liver disease    - Discussed management of MALFD:    - Exercise regularly as tolerated              - Patients who exercise regularly can have improvement of insulin resistance, increase in baseline metabolic activity and resolution of fatty liver disease (even if  appreciable weight loss does not occur)    - Recommend a low-carbohydrate, low-calorie diet  - An ideal weight loss plan would be to lose 7-10% of body wt over the next six months.  This has been proven to resolve fat and inflammation in the liver, and can lead to regression of scarring that might be present  - If pt has diabetes, assertive management is key: goal Hgb A1c goal of =/< 7%.     - No contraindications to meds used in tx of diabetes in pts w/ chronic liver disease  - Management of cholesterol is also very important.    - Use of statins are an effective means of therapy and are not contra-indicated in those with abnormal liver tests OR those with cirrhosis.Value of " these medications in this population far outweigh minor risks of abnormal liver tests  - Goal LDL in those with SAHNI are < 100 mg/dL  - Consider utility of liberalizing coffee consumption as some data that this may slow progression and reverse effects of SAHNI-related fibrosis.  - Consider referral to [facility]    -Complete additional labs (AFP, PETH, alk phos isoenzymes, INR) and fibroscan and then follow up virtually afterwards   > If there is evidence of F3/F4 fibrosis, plan for EGD to screen for varices and would do HCC screening every 6 mo  -Referral placed to general GI for nausea, dry heaves, abdominal discomfort    The longitudinal plan of care for the diagnosis(es)/condition(s) as documented were addressed during this visit. Due to the added complexity in care, I will continue to support Divina in the subsequent management and with ongoing continuity of care.    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     She was last seen in the Hepatology clinic by Dr. Leventhal 10/26/23.     Patient denies any recent fevers or chills.  She denies vomiting.  Does admit to occasional dry heaves and nausea which typically occur in the mornings.  Patient states this has been ongoing for about a year.  She does have a history of GERD but tells me she is on medication that controls her GERD symptoms well.  Patient sometimes finds it difficult to swallow her own saliva.  Denies pain with swallowing.  No yellowing of her eyes or skin.  Passes stool daily.  No bright red blood per rectum or melena.  Patient reports that her  "appetite is \"weird \".  Appetite tends to vary.  Patient mainly drinks water.  Occasionally drinks soda, sparkling water or tea.  Has never vomited blood.  Denies any recent confusion.  Gets mid abdominal discomfort that comes and goes and feels sharp.  Patient states the pain is usually severe for a few minutes and then goes away.  Patient states she \"keeps gaining weight \".  Patient states her current weight is 240 LBS.  Patient states she is currently unable to exercise.  Patient states she has a history of breaking both of her feet.  Patient states she is currently into boots.  She believes she had an EGD once in the past.    Does admit to having an insulin pump.  Patient states if she does not take her Norco, she tends to take Tylenol for pain.  As of late patient states she has been taking Tylenol a few times per week due to right foot pain.  Patient states the amount of Tylenol she takes depends.    Patient tells me that she remains on clozapine and that this medication is her main psych med.  Patient admits to history of schizophrenia.    Admits to current vaping and cigarette use.  Smokes 2-3 cigarettes/day.  Denies illicit drug use.  Patient states she has been sober from alcohol for 2.5 years.  She does admit to a history of alcohol abuse in the past.  Patient states she has completed both inpatient and outpatient, malignancy treatment program in the past.  Denies history of legal issues secondary to alcohol use.    Procedures:  Liver biopsy: 11/14/2019  FINAL DIAGNOSIS:   LIVER, NEEDLE BIOPSY:   - Mild ductopenia with no evidence of active parenchymal or active biliary    tract disease.    MICROSCOPIC:   - Core needle biopsies are adequate for evaluation and show liver parenchyma with approximately 18 portal tracts.    - H&E sections show hepatic lobules with minimal steatosis (<5%)   and no ballooning degeneration, inflammation, or cholestasis.   - The portal tracts are remarkable for ductopenia, with " absent bile ducts in   approximately 5 of the portal tracts (highlighted with CK7   immunohistochemistry), along with ceroid-laden macrophages.    - CK7 also stains periportal hepatocytes in areas of ductopenia.  The portal tracts do not contain significant inflammation or show interface activity.  - Ductular proliferation is not a prominent feature.   Florid duct lesions are not present.  There is no evidence of significant   fibrosis on trichrome stain.   -Special stain for reticulin shows preserved liver plate architecture.     The main finding in this biopsy is the presence of mild ductopenia,   without evidence of active duct injury.   An underlying cause for the ductopenia is not apparent in this biopsy, but    may have been secondary to an episode of prior bile duct injury (e.g. drug reaction) or idiopathic adult ductopenia.    There is only minimal steatosis in this biopsy and there is no evidence of steatohepatitis or significant fibrosis.     PMH:    has a past medical history of Acquired asplenia, Acute pain of left knee (03/22/2019), Acute-on-chronic kidney injury (03/22/2019), ARF (acute renal failure) (03/23/2019), Asthma, Bipolar disorder (H), Cardiac murmur, Cervical high risk HPV (human papillomavirus) test positive (06/20/2017), Chiari malformation type I (H), Chronic bilateral low back pain without sciatica, Deep venous thrombosis of arm (H) (01/06/2017), Depressive disorder, DVT (deep venous thrombosis) (H), DVT of upper extremity (deep vein thrombosis) (H) (2021), Esophageal reflux, Hypertension, Insomnia, Leukocytosis, Mild intermittent asthma, Morbid obesity (H), Mucinous neoplasm of pancreas, NAFL (nonalcoholic fatty liver), Neck pain, Nicotine abuse, Other specified types of schizophrenia, unspecified condition, Schizoaffective disorder, depressive type (H), Stage 3a chronic kidney disease (CKD) (H), Type 2 diabetes mellitus (H), Ulnar neuropathy of both upper extremities, and Vitamin D  insufficiency.     SMH:    has a past surgical history that includes ENT surgery (10/01/2000); tonsillectomy (10/01/2000); splenectomy (2015); Adrenalectomy (Left, 2015); Pancreatectomy partial (2015); Percutaneous biopsy liver (Right, 11/14/2019); IR Liver Biopsy Percutaneous (11/14/2019); biopsy; Breast surgery (2013); and colonoscopy.     Medications:   Current Outpatient Medications   Medication Sig Dispense Refill    FLUoxetine (PROZAC) 20 MG capsule Take 3 Capsules (60 mg) by mouth once daily in the morning.      QUEtiapine (SEROQUEL) 25 MG tablet TAKE ONE TABLET BY MOUTH FOUR TIMES DAILY AS NEEDED FOR AGITATION OR HALLUCINATIONS      ACCU-CHEK GUIDE test strip Use to test blood sugar 3 times daily or as directed. 150 strip 1    acetaminophen (TYLENOL) 500 MG tablet Take 1-2 tablets (500-1,000 mg) by mouth every 6 hours as needed for mild pain. (Patient not taking: Reported on 2/28/2025) 40 tablet 0    albuterol (VENTOLIN HFA) 108 (90 Base) MCG/ACT inhaler INHALE 2 PUFFS INTO THE LUNGS EVERY SIX  HOURS AS NEEDED FOR SHORTNESS OF BREATH 18 g 5    alendronate (FOSAMAX) 70 MG tablet Take 1 tablet (70 mg) by mouth every 7 days (Patient not taking: Reported on 2/28/2025) 4 tablet 2    amitriptyline (ELAVIL) 10 MG tablet Take 1 tablet (10 mg) by mouth at bedtime. 30 tablet 0    ARIPiprazole (ABILIFY) 15 MG tablet Take 1 Tablet (15 mg) by mouth once daily in the evening.      atorvastatin (LIPITOR) 20 MG tablet Take 1 tablet (20 mg) by mouth daily 90 tablet 3    Biotin 5 MG TABS Take 1 tablet by mouth daily 100 tablet 1    blood glucose (ACCU-CHEK GUIDE) test strip Use to test blood sugar 3 times daily or as directed. 100 strip 11    bumetanide (BUMEX) 0.5 MG tablet Take 1 tablet (0.5 mg) by mouth daily 30 tablet 2    carvedilol (COREG) 12.5 MG tablet Take 1 tablet (12.5 mg) by mouth 2 times daily 60 tablet 11    cloZAPine (CLOZARIL) 50 MG tablet Take 150 mg by mouth at bedtime      diclofenac (VOLTAREN) 1 % topical  "gel Apply 2 g topically 4 times daily (Patient not taking: Reported on 2/28/2025) 150 g 1    docusate sodium (COLACE) 100 MG capsule Take 1 capsule (100 mg) by mouth 2 times daily as needed for constipation. 60 capsule 5    glucose (BD GLUCOSE) 4 g chewable tablet Take 1 tablet by mouth every hour as needed for low blood sugar 30 tablet 4    HYDROcodone-acetaminophen (NORCO) 5-325 MG tablet Take 1 tablet by mouth every 6 hours as needed for pain. 20 tablet 0    hydrOXYzine HCl (ATARAX) 25 MG tablet Take 25 mg by mouth 3 times daily as needed for anxiety      insulin aspart (NOVOLOG VIAL) 100 UNITS/ML vial for use with continuous insulin pump  Max TDD 80 150 mL 1    Insulin Infusion Pump (MINIMED 780G INSULIN PUMP) KIT 1 each daily SmartGuard Target: 100; Active Insulin Time: 2 hours (recommended); SmartGuardTM Warmup/Manual Mode: Suspend before low (recommended) 1 kit 0    Insulin Infusion Pump Supplies (EXTENDED INFUSION SET 23\"/6MM) MISC 1 each every 7 days Max Total Daily Dose 70 units; Change infusion set & reservoir every 7 days (recommended) 10 each 6    Insulin Infusion Pump Supplies (EXTENDED RESERVOIR 3ML) MISC 1 each every 7 days 10 each 11    lamoTRIgine (LAMICTAL) 100 MG tablet Take 100 mg by mouth At Bedtime      loratadine (CLARITIN) 10 MG tablet Take 1 tablet (10 mg) by mouth every morning 30 tablet 11    meclizine (ANTIVERT) 25 MG tablet Take 1 tablet (25 mg) by mouth 3 times daily as needed for dizziness (Patient not taking: Reported on 2/28/2025) 30 tablet 0    nicotine (NICODERM CQ) 14 MG/24HR 24 hr patch Place 1 patch over 24 hours onto the skin every 24 hours. (Patient not taking: Reported on 2/28/2025) 7 patch 0    nicotine (NICODERM CQ) 21 MG/24HR 24 hr patch Place 1 patch over 24 hours onto the skin every 24 hours. 21 mg for about 2 weeks, then decrease to 14 mg for about 7 days and then decrease to 7 mg for up to 7 days and stop (Patient not taking: Reported on 2/28/2025) 14 patch 0    " nicotine (NICODERM CQ) 7 MG/24HR 24 hr patch Place 1 patch over 24 hours onto the skin every 24 hours. (Patient not taking: Reported on 2/28/2025) 7 patch 0    nitroGLYcerin 0.2% ointment Apply 1 click (0.25 g) topically every 24 hours for 14 days 4 g 0    omeprazole (PRILOSEC) 20 MG DR capsule TAKE 1 CAPSULE BY MOUTH EVERY DAY 90 capsule 2    Suvorexant (BELSOMRA) 10 MG tablet Take 10 mg by mouth at bedtime      topiramate (TOPAMAX) 25 MG tablet Take 1 tablet (25 mg) by mouth 2 times daily. 60 tablet 2     No current facility-administered medications for this visit.     Previous work-up:  Lab Results   Component Value Date    HEPBANG Nonreactive 05/26/2017    HCVAB  05/26/2017     Nonreactive   Assay performance characteristics have not been established for newborns,   infants, and children      GONZALEZ 25 02/10/2025    IRONSAT 19 02/10/2025    TTG 1.3 01/02/2023    TTGG 0.8 01/02/2023     01/02/2023    CER 25 08/15/2017    MITM2 <1 07/16/2019    TSH 0.84 08/02/2023    CHOL 190 07/30/2024    HDL 45 (L) 07/30/2024     (H) 07/30/2024     (H) 07/30/2024    TRIG 144 07/30/2024    A1C 7.9 (H) 03/10/2025      Recent Labs   Lab Test 02/02/25  2151 11/27/24  1142 10/29/24  1253 07/30/24  1408 08/02/23  1607 05/23/23  0304 01/11/23  1207 10/19/22  0739 07/11/22  1401 07/06/22  1406   ALKPHOS 187* 200* 213* 223* 197* 129* 172* 178* 176* 160*   ALT 33 27 26 35 68* 27 24 61* 61* 39   AST 35 20 22 21 33 19 20 34 36 28   BILITOTAL <0.2 0.2 <0.2 0.3 0.2 <0.2 0.2 0.2 0.2 0.2           Allergies:     Allergies   Allergen Reactions    Oxycodone-Acetaminophen Other (See Comments)     weston Bustillos          Social History:     Social History     Socioeconomic History    Marital status: Single     Spouse name: Not on file    Number of children: 0    Years of education: Not on file    Highest education level: Not on file   Occupational History    Not on file   Tobacco Use    Smoking status: Every Day     Current  packs/day: 0.00     Average packs/day: 0.5 packs/day for 0.2 years (0.1 ttl pk-yrs)     Types: Cigarettes, Vaping Device     Start date: 2023     Last attempt to quit: 2024     Years since quittin.0     Passive exposure: Yes    Smokeless tobacco: Never    Tobacco comments:     E- cig and cigarettes (2-3 cigarettes per day)   Vaping Use    Vaping status: Every Day    Substances: Nicotine, Flavoring    Devices: Disposable   Substance and Sexual Activity    Alcohol use: No     Comment: sober since     Drug use: No    Sexual activity: Yes     Partners: Female     Birth control/protection: I.U.D.     Comment: Both male and female    Other Topics Concern    Parent/sibling w/ CABG, MI or angioplasty before 65F 55M? No   Social History Narrative    Not on file     Social Drivers of Health     Financial Resource Strain: Low Risk  (2025)    Financial Resource Strain     Within the past 12 months, have you or your family members you live with been unable to get utilities (heat, electricity) when it was really needed?: No   Food Insecurity: Low Risk  (2025)    Food Insecurity     Within the past 12 months, did you worry that your food would run out before you got money to buy more?: No     Within the past 12 months, did the food you bought just not last and you didn t have money to get more?: No   Transportation Needs: Low Risk  (2025)    Transportation Needs     Within the past 12 months, has lack of transportation kept you from medical appointments, getting your medicines, non-medical meetings or appointments, work, or from getting things that you need?: No   Physical Activity: Inactive (2025)    Exercise Vital Sign     Days of Exercise per Week: 1 day     Minutes of Exercise per Session: 0 min   Stress: Stress Concern Present (2025)    [place] [employer] - Occupational Stress Questionnaire     Feeling of Stress : To some extent   Social Connections: Unknown  (1/2/2025)    Social Connection and Isolation Panel [NHANES]     Frequency of Communication with Friends and Family: Not on file     Frequency of Social Gatherings with Friends and Family: More than three times a week     Attends [employer] Services: Not on file     Active Member of Clubs or Organizations: Not on file     Attends Club or Organization Meetings: Not on file     Marital Status: Not on file   Interpersonal Safety: Low Risk  (2/14/2025)    Interpersonal Safety     Do you feel physically and emotionally safe where you currently live?: Yes     Within the past 12 months, have you been hit, slapped, kicked or otherwise physically hurt by someone?: No     Within the past 12 months, have you been humiliated or emotionally abused in other ways by your partner or ex-partner?: No   Housing Stability: Low Risk  (1/2/2025)    Housing Stability     Do you have housing? : Yes     Are you worried about losing your housing?: No          Family History:     Family History   Problem Relation Age of Onset    Respiratory Mother         ASTHMA    Allergies Mother     Depression Mother     Chronic Obstructive Pulmonary Disease Mother     Anxiety Disorder Mother     Mental Illness Mother     Asthma Mother     Heart Disease Father         HEART VALVE REPLACEMENT    Hypertension Father     Diabetes Father     Depression Father     Anxiety Disorder Father     Mental Illness Father     Obesity Father     Respiratory Sister     Allergies Sister     Depression Sister     Respiratory Sister     Allergies Sister     Depression Sister     Respiratory Brother     Allergies Brother     Sleep Apnea Maternal Grandfather     Kidney Disease No family hx of     Liver Disease No family hx of     Liver Cancer No family hx of           Review of Systems:   Gen: See HPI          Physical Exam:     Gen: A&Ox3, NAD  HEENT: Sclera anicteric  Skin: No jaundice  Neuro: grossly intact   Psych: appropriate mood and affects         Data:   Reviewed in  person and significant for:    Lab Results   Component Value Date     02/24/2025     07/08/2021      Lab Results   Component Value Date    POTASSIUM 4.7 02/24/2025    POTASSIUM 5.1 12/19/2022    POTASSIUM 4.4 07/08/2021     Lab Results   Component Value Date    CHLORIDE 107 02/24/2025    CHLORIDE 112 12/19/2022    CHLORIDE 108 07/08/2021     Lab Results   Component Value Date    CO2 25 02/24/2025    CO2 26 12/19/2022    CO2 26 07/08/2021     Lab Results   Component Value Date    BUN 36.3 02/24/2025    BUN 19 12/19/2022    BUN 22 07/08/2021     Lab Results   Component Value Date    CR 1.95 02/24/2025    CR 1.32 07/08/2021       Lab Results   Component Value Date    WBC 12.8 03/10/2025    WBC 14.8 07/08/2021     Lab Results   Component Value Date    HGB 11.8 03/10/2025    HGB 12.9 07/08/2021     Lab Results   Component Value Date    HCT 40.1 03/10/2025    HCT 39.8 07/08/2021     Lab Results   Component Value Date    MCV 98 03/10/2025    MCV 95 07/08/2021     Lab Results   Component Value Date     03/10/2025     07/08/2021       Lab Results   Component Value Date    AST 35 02/02/2025    AST 38 07/08/2021     Lab Results   Component Value Date    ALT 33 02/02/2025    ALT 65 07/08/2021     Lab Results   Component Value Date    BILICONJ 0.0 06/23/2009      Lab Results   Component Value Date    BILITOTAL <0.2 02/02/2025    BILITOTAL 0.3 07/08/2021       Lab Results   Component Value Date    ALBUMIN 3.7 02/10/2025    ALBUMIN 3.0 12/19/2022    ALBUMIN 3.4 07/08/2021     Lab Results   Component Value Date    PROTTOTAL 7.2 02/02/2025    PROTTOTAL 7.9 07/08/2021      Lab Results   Component Value Date    ALKPHOS 187 02/02/2025    ALKPHOS 216 07/08/2021       Lab Results   Component Value Date    INR 0.93 07/25/2024    INR 0.91 07/08/2021         Imaging:      CT ABDOMEN PELVIS W CONTRAST  LOCATION: [facility]  DATE: 2/3/2025     INDICATION: Acute onset epigastric/RUQ pain  and tenderness.  COMPARISON: CT abdomen and pelvis without/with IV contrast 12/23/2024.  TECHNIQUE: CT scan of the abdomen and pelvis was performed following injection of IV contrast. Multiplanar reformats were obtained. Dose reduction techniques were used.  CONTRAST: 100 mL Isovue 370.     FINDINGS:   LOWER CHEST: Mild elevation of the right hemidiaphragm. A few strands of platelike atelectasis in the right base. No pleural effusion on either side. Normal cardiac size. No pericardial effusion.     HEPATOBILIARY: Heterogeneous hepatic parenchyma with mildly lobulated contour, worrisome for underlying cirrhosis. Tiny arterial phase enhancement in the right hepatic lobe inferiorly is not appreciable on current study. Patent hepatic and portal veins.   The gallbladder is not well distended.     PANCREAS: Distal pancreatectomy. Normal pancreatic remnant.     SPLEEN: Surgically absent.     ADRENAL GLANDS: Left adrenalectomy. Normal right adrenal gland.     KIDNEYS/BLADDER: No urinary tract calculi. Both kidneys are well-perfused without hydronephrosis or hydroureter. Partially distended normal urinary bladder.     BOWEL: Normal appendix. Gas and stool material within normal caliber colon. No mechanical obstruction or free gas.     LYMPH NODES: No suspicious abdominopelvic adenopathy.     VASCULATURE: Normal caliber abdominal aorta and the IVC. Retroaortic left renal vein, normal vascular variant anatomy.     PELVIC ORGANS: Anteverted uterus containing an IUCD, well-seated. Right ovarian dominant follicle measures 2.3 cm (image 81, series 2). No adenopathy or free fluid. Normal appendix.     MUSCULOSKELETAL: Anatomic alignment of the bones and joints. Intramedullary nail with proximal screws, partially visualized, unchanged. Tiny fat-containing ventral umbilical hernia.                                                                      IMPRESSION:   1.  The gallbladder is not well distended. No calcified gallstones,  biliary dilatation or adjacent inflammation. Heterogeneous hepatic parenchyma with lobulated contour, worrisome for underlying cirrhosis. Tiny focal enhancement in the right hepatic lobe   inferiorly is not appreciable on current study.     2.  Distal pancreatectomy, left adrenalectomy and splenectomy. Normal pancreatic remnant.     3.  No mechanical bowel obstruction, free gas or free fluid. Normal appendix.     4.  No urinary tract calculi or obstruction. Anteverted uterus containing an IUCD, well-seated.     5.  Intramedullary nail with proximal screws, partially visualized, unchanged.

## 2025-03-11 ENCOUNTER — TELEPHONE (OUTPATIENT)
Dept: GASTROENTEROLOGY | Facility: CLINIC | Age: 40
End: 2025-03-11
Payer: COMMERCIAL

## 2025-03-11 LAB — AFP SERPL-MCNC: <1.8 NG/ML

## 2025-03-11 NOTE — TELEPHONE ENCOUNTER
[facility] Call [facility]    Phone Message    May a detailed message be left on voicemail: Yes    Reason for Call: Other: Patient is currently scheduled on 4-30, as visit type New GI Urgent. This is outside the expected timeline for this referral. Patient has been added to the waitlist.      Action Taken: Message routed to:  Other: GI REFERRAL TRIAGE POOL     Travel Screening: Not Applicable

## 2025-03-12 ENCOUNTER — TELEPHONE (OUTPATIENT)
Dept: GASTROENTEROLOGY | Facility: CLINIC | Age: 40
End: 2025-03-12
Payer: COMMERCIAL

## 2025-03-12 LAB
ALP BONE SERPL-CCNC: 62 U/L
ALP LIVER SERPL-CCNC: 176 U/L
ALP OTHER SERPL-CCNC: 0 U/L
ALP SERPL-CCNC: 238 U/L

## 2025-03-12 NOTE — TELEPHONE ENCOUNTER
Patient confirmed scheduled appointment:     Date: 3/25/25  Time: 2:30 pm  Appointment Type: Fibro Scan  Provider: Ivelisse Sanchez PA-C  Location: [place]  Testing/imaging: lab  Additional Notes:

## 2025-03-16 DIAGNOSIS — M80.00XD OSTEOPOROSIS WITH CURRENT PATHOLOGICAL FRACTURE WITH ROUTINE HEALING, UNSPECIFIED OSTEOPOROSIS TYPE, SUBSEQUENT ENCOUNTER: ICD-10-CM

## 2025-03-17 RX ORDER — ALENDRONATE SODIUM 70 MG/1
70 TABLET ORAL
Qty: 4 TABLET | Refills: 2 | Status: SHIPPED | OUTPATIENT
Start: 2025-03-17

## 2025-03-18 ENCOUNTER — VIRTUAL VISIT (OUTPATIENT)
Dept: ENDOCRINOLOGY | Facility: CLINIC | Age: 40
End: 2025-03-18
Payer: COMMERCIAL

## 2025-03-18 DIAGNOSIS — K21.9 GASTROESOPHAGEAL REFLUX DISEASE WITHOUT ESOPHAGITIS: ICD-10-CM

## 2025-03-18 DIAGNOSIS — E11.22 TYPE 2 DIABETES MELLITUS WITH STAGE 3B CHRONIC KIDNEY DISEASE, WITH LONG-TERM CURRENT USE OF INSULIN (H): Primary | ICD-10-CM

## 2025-03-18 DIAGNOSIS — Z79.4 TYPE 2 DIABETES MELLITUS WITH STAGE 3B CHRONIC KIDNEY DISEASE, WITH LONG-TERM CURRENT USE OF INSULIN (H): Primary | ICD-10-CM

## 2025-03-18 DIAGNOSIS — N18.32 TYPE 2 DIABETES MELLITUS WITH STAGE 3B CHRONIC KIDNEY DISEASE, WITH LONG-TERM CURRENT USE OF INSULIN (H): Primary | ICD-10-CM

## 2025-03-18 DIAGNOSIS — G43.909 MIGRAINE WITHOUT STATUS MIGRAINOSUS, NOT INTRACTABLE, UNSPECIFIED MIGRAINE TYPE: ICD-10-CM

## 2025-03-18 PROCEDURE — 98005 SYNCH AUDIO-VIDEO EST LOW 20: CPT | Performed by: NURSE PRACTITIONER

## 2025-03-18 RX ORDER — AMITRIPTYLINE HYDROCHLORIDE 10 MG/1
10 TABLET ORAL AT BEDTIME
Qty: 90 TABLET | Refills: 1 | Status: SHIPPED | OUTPATIENT
Start: 2025-03-18

## 2025-03-18 RX ORDER — OMEPRAZOLE 20 MG/1
CAPSULE, DELAYED RELEASE ORAL
Qty: 90 CAPSULE | Refills: 2 | Status: SHIPPED | OUTPATIENT
Start: 2025-03-18

## 2025-03-18 NOTE — Clinical Note
3/18/2025      Divina Delgadillo  [address]  [place] [facility] [address]      Dear Colleague,    Thank you for referring your patient, Divina Delgadillo, to the [facility]. Please see a copy of my visit note below.    [facility] ENDOCRINOLOGY    Diabetes Note 3/18/2025    Divina Delgadillo, 1985, [id number]          Reason for visit      No diagnosis found.    HPI     Divina Delgadillo is a very pleasant 39 year old old female who presents for follow up.  SUMMARY:  TODAY:  Blood glucose data:      Past Medical History     Patient Active Problem List   Diagnosis    Esophageal reflux    NAFL (nonalcoholic fatty liver)    Essential hypertension    Hyperlipidemia LDL goal <100    Stage 3 chronic kidney disease    Mucinous neoplasm of pancreas    Type 2 diabetes mellitus with stage 3 chronic kidney disease, with long-term current use of insulin (H)    Bipolar disorder (H)    Cervical high risk HPV (human papillomavirus) test positive    IUD (intrauterine device) in place    Morbid obesity (H)    Mild intermittent asthma with acute exacerbation    Nicotine abuse    Chronic leukocytosis    Acquired asplenia    Borderline personality disorder (H)    PTSD (post-traumatic stress disorder)    Long term (current) use of anticoagulants    Orthostatic hypotension    Tobacco abuse    Vitamin D insufficiency    Depressive disorder    Schizoaffective disorder, depressive type (H)    Cardiac murmur    History of DVT of arm  (deep vein thrombosis)    Insomnia, unspecified type    Ulnar neuropathy of both upper extremities    Chiari malformation type I (H)    Deep venous thrombosis of arm (H)    Neutrophilic leukocytosis    Other fracture of left femur, initial encounter for closed fracture (H)    Peroneal tendinitis of left lower extremity    Acute left ankle pain    Avascular necrosis of bone of ankle (H)    Secondary renal hyperparathyroidism        Family History       family history  "includes Allergies in her brother, mother, sister, and sister; Anxiety Disorder in her father and mother; Asthma in her mother; Chronic Obstructive Pulmonary Disease in her mother; Depression in her father, mother, sister, and sister; Diabetes in her father; Heart Disease in her father; Hypertension in her father; Mental Illness in her father and mother; Obesity in her father; Respiratory in her brother, mother, sister, and sister; Sleep Apnea in her maternal grandfather.    Social History      reports that she has been smoking cigarettes and vaping device. She started smoking about 15 months ago. She has a 0.1 pack-year smoking history. She has been exposed to tobacco smoke. She has never used smokeless tobacco. She reports that she does not drink alcohol and does not use drugs.      Review of Systems     Patient has {:[id number]}  Remainder negative except as noted in HPI.    Vital Signs     There were no vitals taken for this visit.  Wt Readings from Last 3 Encounters:   02/28/25 108.9 kg (240 lb)   02/26/25 108.9 kg (240 lb)   02/23/25 108.9 kg (240 lb)       Physical Exam     [unfilled]      Assessment     No diagnosis found.    Plan       Shital Roberts NP   Endocrinology  3/18/2025  3:47 PM    This note has been dictated using voice recognition software.  Any grammatical or context distortions are unintentional and inherent to the software.       Lab Results     No results found for: \"HGBA1C\", \"CREATININE\", \"MICROALBUR\"    Cholesterol   Date Value Ref Range Status   07/30/2024 190 <200 mg/dL Final   03/26/2021 147 <200 mg/dL Final     HDL Cholesterol   Date Value Ref Range Status   03/26/2021 57 >49 mg/dL Final     Direct Measure HDL   Date Value Ref Range Status   07/30/2024 45 (L) >=50 mg/dL Final     Triglycerides   Date Value Ref Range Status   07/30/2024 144 <150 mg/dL Final   03/26/2021 90 <150 mg/dL Final     Comment:     Fasting specimen       [unfilled]      Current Medications     Outpatient Medications " Prior to Visit   Medication Sig Dispense Refill    acetaminophen (TYLENOL) 500 MG tablet Take 1-2 tablets (500-1,000 mg) by mouth every 6 hours as needed for mild pain. 40 tablet 0    albuterol (VENTOLIN HFA) 108 (90 Base) MCG/ACT inhaler INHALE 2 PUFFS INTO THE LUNGS EVERY SIX  HOURS AS NEEDED FOR SHORTNESS OF BREATH 18 g 5    alendronate (FOSAMAX) 70 MG tablet Take 1 tablet (70 mg) by mouth every 7 days 4 tablet 2    amitriptyline (ELAVIL) 10 MG tablet Take 1 tablet (10 mg) by mouth at bedtime. 90 tablet 1    ARIPiprazole (ABILIFY) 15 MG tablet Take 1 Tablet (15 mg) by mouth once daily in the evening.      atorvastatin (LIPITOR) 20 MG tablet Take 1 tablet (20 mg) by mouth daily 90 tablet 3    Biotin 5 MG TABS Take 1 tablet by mouth daily 100 tablet 1    blood glucose (ACCU-CHEK GUIDE) test strip Use to test blood sugar 3 times daily or as directed. 100 strip 11    bumetanide (BUMEX) 0.5 MG tablet Take 1 tablet (0.5 mg) by mouth daily 30 tablet 2    carvedilol (COREG) 12.5 MG tablet Take 1 tablet (12.5 mg) by mouth 2 times daily 60 tablet 11    cloZAPine (CLOZARIL) 50 MG tablet Take 150 mg by mouth at bedtime      docusate sodium (COLACE) 100 MG capsule Take 1 capsule (100 mg) by mouth 2 times daily as needed for constipation. 60 capsule 5    FLUoxetine (PROZAC) 20 MG capsule Take 3 Capsules (60 mg) by mouth once daily in the morning.      glucose (BD GLUCOSE) 4 g chewable tablet Take 1 tablet by mouth every hour as needed for low blood sugar 30 tablet 4    HYDROcodone-acetaminophen (NORCO) 5-325 MG tablet Take 1 tablet by mouth every 6 hours as needed for pain. 20 tablet 0    hydrOXYzine HCl (ATARAX) 25 MG tablet Take 25 mg by mouth 3 times daily as needed for anxiety      insulin aspart (NOVOLOG VIAL) 100 UNITS/ML vial for use with continuous insulin pump  Max TDD 80 150 mL 1    Insulin Infusion Pump (MINIMED 780G INSULIN PUMP) KIT 1 each daily SmartGuard Target: 100; Active Insulin Time: 2 hours (recommended);  "SmartGuardTM Warmup/Manual Mode: Suspend before low (recommended) 1 kit 0    Insulin Infusion Pump Supplies (EXTENDED INFUSION SET 23\"/6MM) MISC 1 each every 7 days Max Total Daily Dose 70 units; Change infusion set & reservoir every 7 days (recommended) 10 each 6    Insulin Infusion Pump Supplies (EXTENDED RESERVOIR 3ML) MISC 1 each every 7 days 10 each 11    lamoTRIgine (LAMICTAL) 100 MG tablet Take 100 mg by mouth At Bedtime      loratadine (CLARITIN) 10 MG tablet Take 1 tablet (10 mg) by mouth every morning 30 tablet 11    omeprazole (PRILOSEC) 20 MG DR capsule TAKE 1 CAPSULE BY MOUTH EVERY DAY 90 capsule 2    QUEtiapine (SEROQUEL) 25 MG tablet TAKE ONE TABLET BY MOUTH FOUR TIMES DAILY AS NEEDED FOR AGITATION OR HALLUCINATIONS      Suvorexant (BELSOMRA) 10 MG tablet Take 10 mg by mouth at bedtime      topiramate (TOPAMAX) 25 MG tablet Take 1 tablet (25 mg) by mouth 2 times daily. 60 tablet 2    ACCU-CHEK GUIDE test strip Use to test blood sugar 3 times daily or as directed. (Patient not taking: Reported on 3/18/2025) 150 strip 1    diclofenac (VOLTAREN) 1 % topical gel Apply 2 g topically 4 times daily (Patient not taking: Reported on 2/26/2025) 150 g 1    meclizine (ANTIVERT) 25 MG tablet Take 1 tablet (25 mg) by mouth 3 times daily as needed for dizziness (Patient not taking: Reported on 2/26/2025) 30 tablet 0    nicotine (NICODERM CQ) 14 MG/24HR 24 hr patch Place 1 patch over 24 hours onto the skin every 24 hours. (Patient not taking: Reported on 2/26/2025) 7 patch 0    nicotine (NICODERM CQ) 21 MG/24HR 24 hr patch Place 1 patch over 24 hours onto the skin every 24 hours. 21 mg for about 2 weeks, then decrease to 14 mg for about 7 days and then decrease to 7 mg for up to 7 days and stop (Patient not taking: Reported on 2/26/2025) 14 patch 0    nicotine (NICODERM CQ) 7 MG/24HR 24 hr patch Place 1 patch over 24 hours onto the skin every 24 hours. (Patient not taking: Reported on 2/14/2025) 7 patch 0    " "nitroGLYcerin 0.2% ointment Apply 1 click (0.25 g) topically every 24 hours for 14 days 4 g 0     No facility-administered medications prior to visit.                 Virtual Visit Details    Type of service:  Video Visit   Video Start Time: {video visit start/end time for provider to select:072132}  Video End Time:{video visit start/end time for provider to select:729485}    Originating Location (pt. Location): {video visit patient location:638894::\"Home\"}  {PROVIDER LOCATION On-site should be selected for visits conducted from your clinic location or adjoining [facility] hospital, academic office, or other nearby [facility] building. Off-site should be selected for all other provider locations, including home:485972}  Distant Location (provider location):  {virtual location provider:327983}  Platform used for Video Visit: {Virtual Visit Platforms:634608::\"[employer]\"}        Again, thank you for allowing me to participate in the care of your patient.        Sincerely,        Shital Roberts NP    Electronically signed"

## 2025-03-18 NOTE — TELEPHONE ENCOUNTER
Writer called to help get Pt scheduled for a sooner GI appointment. Pt accepted a virtual visit with Dr. Fleming on 3/19/2025 at 8:30 AM. The virtual check in process was explained to the Pt, who verbalized understanding.

## 2025-03-18 NOTE — PROGRESS NOTES
"[facility] ENDOCRINOLOGY    Diabetes Note 3/18/2025    Divina Delgadillo, 1985, [id number]          Reason for visit      1. Type 2 diabetes mellitus with stage 3b chronic kidney disease, with long-term current use of insulin (H)        HPI     Divina Delgadillo is a very pleasant 39 year old old female who presents for follow up.  SUMMARY:    Divina is seen today via Video Visit for DM 2. Her current A1c is 7.9 and up slightly from her previous of 7.7.  She continues to use the Medtronic 780 G insulin pump with the Guardian 4 CGM, which was downloaded and data was reviewed.     TIR was 84%, Above, 15% and low 1%. GMI of 6.6%. Basal/bolus ratio was 36/64. She is using a lot less insulin than she was with her last appointment. (Now 55 units, then 76 units)    Pt reports today that she has joined [employer] and that it has been of great support and help to her. She has had no \"sugar\" for 2 weeks.     Renal function is stable.           Blood glucose data:      Past Medical History     Patient Active Problem List   Diagnosis    Esophageal reflux    NAFL (nonalcoholic fatty liver)    Essential hypertension    Hyperlipidemia LDL goal <100    Stage 3 chronic kidney disease    Mucinous neoplasm of pancreas    Type 2 diabetes mellitus with stage 3 chronic kidney disease, with long-term current use of insulin (H)    Bipolar disorder (H)    Cervical high risk HPV (human papillomavirus) test positive    IUD (intrauterine device) in place    Morbid obesity (H)    Mild intermittent asthma with acute exacerbation    Nicotine abuse    Chronic leukocytosis    Acquired asplenia    Borderline personality disorder (H)    PTSD (post-traumatic stress disorder)    Long term (current) use of anticoagulants    Orthostatic hypotension    Tobacco abuse    Vitamin D insufficiency    Depressive disorder    Schizoaffective disorder, depressive type (H)    Cardiac murmur    History of DVT of arm  (deep vein thrombosis)    " Insomnia, unspecified type    Ulnar neuropathy of both upper extremities    Chiari malformation type I (H)    Deep venous thrombosis of arm (H)    Neutrophilic leukocytosis    Other fracture of left femur, initial encounter for closed fracture (H)    Peroneal tendinitis of left lower extremity    Acute left ankle pain    Avascular necrosis of bone of ankle (H)    Secondary renal hyperparathyroidism        Family History       family history includes Allergies in her brother, mother, sister, and sister; Anxiety Disorder in her father and mother; Asthma in her mother; Chronic Obstructive Pulmonary Disease in her mother; Depression in her father, mother, sister, and sister; Diabetes in her father; Heart Disease in her father; Hypertension in her father; Mental Illness in her father and mother; Obesity in her father; Respiratory in her brother, mother, sister, and sister; Sleep Apnea in her maternal grandfather.    Social History      reports that she has been smoking cigarettes and vaping device. She started smoking about 15 months ago. She has a 0.1 pack-year smoking history. She has been exposed to tobacco smoke. She has never used smokeless tobacco. She reports that she does not drink alcohol and does not use drugs.      Review of Systems     Patient has no polyuria or polydipsia, no chest pain, dyspnea or TIA's, no numbness, tingling or pain in extremities  Remainder negative except as noted in HPI.    Vital Signs     There were no vitals taken for this visit.  Wt Readings from Last 3 Encounters:   02/28/25 108.9 kg (240 lb)   02/26/25 108.9 kg (240 lb)   02/23/25 108.9 kg (240 lb)       Physical Exam     Constitutional:  Well developed, Well nourished  HENT:  Normocephalic,   Neck: normal in appearance  Eyes:  PERRL, Conjunctiva pink  Respiratory:  No respiratory distress  Skin: No acanthosis nigricans, lipoatrophy or lipodystrophy  Neurologic:  Alert & oriented x 3, nonfocal  Psychiatric:  Affect, Mood, Insight  "appropriate        Assessment     1. Type 2 diabetes mellitus with stage 3b chronic kidney disease, with long-term current use of insulin (H)        Plan       No changes to her settings today as she is doing well. I will see her back in 3 months.           Shital Roberts NP  [facility]ocrin[facility]  3/18/2025  3:47 PM      Lab Results     No results found for: \"HGBA1C\", \"CREATININE\", \"MICROALBUR\"    Cholesterol   Date Value Ref Range Status   07/30/2024 190 <200 mg/dL Final   03/26/2021 147 <200 mg/dL Final     HDL Cholesterol   Date Value Ref Range Status   03/26/2021 57 >49 mg/dL Final     Direct Measure HDL   Date Value Ref Range Status   07/30/2024 45 (L) >=50 mg/dL Final     Triglycerides   Date Value Ref Range Status   07/30/2024 144 <150 mg/dL Final   03/26/2021 90 <150 mg/dL Final     Comment:     Fasting specimen       [unfilled]      Current Medications     Outpatient Medications Prior to Visit   Medication Sig Dispense Refill    acetaminophen (TYLENOL) 500 MG tablet Take 1-2 tablets (500-1,000 mg) by mouth every 6 hours as needed for mild pain. 40 tablet 0    albuterol (VENTOLIN HFA) 108 (90 Base) MCG/ACT inhaler INHALE 2 PUFFS INTO THE LUNGS EVERY SIX  HOURS AS NEEDED FOR SHORTNESS OF BREATH 18 g 5    alendronate (FOSAMAX) 70 MG tablet Take 1 tablet (70 mg) by mouth every 7 days 4 tablet 2    amitriptyline (ELAVIL) 10 MG tablet Take 1 tablet (10 mg) by mouth at bedtime. 90 tablet 1    ARIPiprazole (ABILIFY) 15 MG tablet Take 1 Tablet (15 mg) by mouth once daily in the evening.      atorvastatin (LIPITOR) 20 MG tablet Take 1 tablet (20 mg) by mouth daily 90 tablet 3    Biotin 5 MG TABS Take 1 tablet by mouth daily 100 tablet 1    blood glucose (ACCU-CHEK GUIDE) test strip Use to test blood sugar 3 times daily or as directed. 100 strip 11    bumetanide (BUMEX) 0.5 MG tablet Take 1 tablet (0.5 mg) by mouth daily 30 tablet 2    carvedilol (COREG) 12.5 MG tablet Take 1 tablet (12.5 mg) by mouth 2 times daily 60 " "tablet 11    cloZAPine (CLOZARIL) 50 MG tablet Take 150 mg by mouth at bedtime      docusate sodium (COLACE) 100 MG capsule Take 1 capsule (100 mg) by mouth 2 times daily as needed for constipation. 60 capsule 5    FLUoxetine (PROZAC) 20 MG capsule Take 3 Capsules (60 mg) by mouth once daily in the morning.      glucose (BD GLUCOSE) 4 g chewable tablet Take 1 tablet by mouth every hour as needed for low blood sugar 30 tablet 4    HYDROcodone-acetaminophen (NORCO) 5-325 MG tablet Take 1 tablet by mouth every 6 hours as needed for pain. 20 tablet 0    hydrOXYzine HCl (ATARAX) 25 MG tablet Take 25 mg by mouth 3 times daily as needed for anxiety      insulin aspart (NOVOLOG VIAL) 100 UNITS/ML vial for use with continuous insulin pump  Max TDD 80 150 mL 1    Insulin Infusion Pump (MINIMED 780G INSULIN PUMP) KIT 1 each daily SmartGuard Target: 100; Active Insulin Time: 2 hours (recommended); SmartGuardTM Warmup/Manual Mode: Suspend before low (recommended) 1 kit 0    Insulin Infusion Pump Supplies (EXTENDED INFUSION SET 23\"/6MM) MISC 1 each every 7 days Max Total Daily Dose 70 units; Change infusion set & reservoir every 7 days (recommended) 10 each 6    Insulin Infusion Pump Supplies (EXTENDED RESERVOIR 3ML) MISC 1 each every 7 days 10 each 11    lamoTRIgine (LAMICTAL) 100 MG tablet Take 100 mg by mouth At Bedtime      loratadine (CLARITIN) 10 MG tablet Take 1 tablet (10 mg) by mouth every morning 30 tablet 11    omeprazole (PRILOSEC) 20 MG DR capsule TAKE 1 CAPSULE BY MOUTH EVERY DAY 90 capsule 2    QUEtiapine (SEROQUEL) 25 MG tablet TAKE ONE TABLET BY MOUTH FOUR TIMES DAILY AS NEEDED FOR AGITATION OR HALLUCINATIONS      Suvorexant (BELSOMRA) 10 MG tablet Take 10 mg by mouth at bedtime      topiramate (TOPAMAX) 25 MG tablet Take 1 tablet (25 mg) by mouth 2 times daily. 60 tablet 2    ACCU-CHEK GUIDE test strip Use to test blood sugar 3 times daily or as directed. (Patient not taking: Reported on 3/18/2025) 150 strip 1 "    diclofenac (VOLTAREN) 1 % topical gel Apply 2 g topically 4 times daily (Patient not taking: Reported on 2/26/2025) 150 g 1    meclizine (ANTIVERT) 25 MG tablet Take 1 tablet (25 mg) by mouth 3 times daily as needed for dizziness (Patient not taking: Reported on 2/26/2025) 30 tablet 0    nicotine (NICODERM CQ) 14 MG/24HR 24 hr patch Place 1 patch over 24 hours onto the skin every 24 hours. (Patient not taking: Reported on 2/26/2025) 7 patch 0    nicotine (NICODERM CQ) 21 MG/24HR 24 hr patch Place 1 patch over 24 hours onto the skin every 24 hours. 21 mg for about 2 weeks, then decrease to 14 mg for about 7 days and then decrease to 7 mg for up to 7 days and stop (Patient not taking: Reported on 2/26/2025) 14 patch 0    nicotine (NICODERM CQ) 7 MG/24HR 24 hr patch Place 1 patch over 24 hours onto the skin every 24 hours. (Patient not taking: Reported on 2/14/2025) 7 patch 0    nitroGLYcerin 0.2% ointment Apply 1 click (0.25 g) topically every 24 hours for 14 days 4 g 0     No facility-administered medications prior to visit.                 Virtual Visit Details    Type of service:  Video Visit   Video Start Time:  1600  Video End Time: 1620    Originating Location (pt. Location): Home    Distant Location (provider location):  On-site  Platform used for Video Visit: [employer]

## 2025-03-19 ENCOUNTER — VIRTUAL VISIT (OUTPATIENT)
Dept: GASTROENTEROLOGY | Facility: CLINIC | Age: 40
End: 2025-03-19
Attending: PHYSICIAN ASSISTANT
Payer: COMMERCIAL

## 2025-03-19 DIAGNOSIS — R11.10 DRY HEAVES: ICD-10-CM

## 2025-03-19 DIAGNOSIS — R10.9 ABDOMINAL DISCOMFORT: ICD-10-CM

## 2025-03-19 DIAGNOSIS — R11.0 NAUSEA: Primary | ICD-10-CM

## 2025-03-19 RX ORDER — OMEPRAZOLE 20 MG/1
20 CAPSULE, DELAYED RELEASE ORAL 2 TIMES DAILY
Qty: 90 CAPSULE | Refills: 2 | Status: SHIPPED | OUTPATIENT
Start: 2025-03-19

## 2025-03-19 NOTE — LETTER
3/19/2025      Divina Delgadillo  [address] [address]  [place] [facility] [place] [address]      Dear Colleague,    Thank you for referring your patient, Divina Delgadillo, to the [facility] [place]. Please see a copy of my visit note below.    GI CLINIC VISIT    CC/REFERRING PROVIDER:  Ivelisse Sanchez NP  REASON FOR CONSULTATION: Nausea     HPI: 39 year old female who was last seen in Hepatology clinic 3/10/15 for abnormal liver tests and concern for cirrhosis.     She was referred urgently to general GI clinic for nausea.     Nausea in the morning starting 6 months ago five minutes after taking medications, lasts an hour. Tried taking medications with food. Once a week has vomiting with pills. Has not tried any medications that help. Periumbilical pain with nausea lasts an hour. No radiation of pain. Lying down helps pain, walking around makes pain worse. Some times can feel dizzy when has nausea.     Having loose stools, 12 times a day for a week or so. Changed diet to eat more vegetables and cut out sugar and carbs. At times has constipation, once a month or so. No trouble swallowing or eating. She had a pancreatectomy and splenectomy for large tumor.     She quit alcohol in 2022, and smokes tobacco and vapes. No IV drugs, no marijuana.    ROS: 10pt ROS performed and otherwise negative.    PERTINENT PAST MEDICAL/SURGICAL HISTORY:  Past Medical History:   Diagnosis Date     Acquired asplenia      Acute pain of left knee 03/22/2019     Acute-on-chronic kidney injury 03/22/2019     ARF (acute renal failure) 03/23/2019     Asthma      Bipolar disorder (H)      Cardiac murmur      Cervical high risk HPV (human papillomavirus) test positive 06/20/2017     Chiari malformation type I (H)      Chronic bilateral low back pain without sciatica      Deep venous thrombosis of arm (H) 01/06/2017    ultrasound 1/6/2017-  venous thrombus in the antecubital fossa region and the left forearm as described      Depressive disorder      DVT (deep venous thrombosis) (H)      DVT of upper extremity (deep vein thrombosis) (H) 2021     Esophageal reflux     GERD     Hypertension      Insomnia      Leukocytosis      Mild intermittent asthma      Morbid obesity (H)      Mucinous neoplasm of pancreas      NAFL (nonalcoholic fatty liver)      Neck pain      Nicotine abuse      Other specified types of schizophrenia, unspecified condition      Schizoaffective disorder, depressive type (H)      Stage 3a chronic kidney disease (CKD) (H)      Type 2 diabetes mellitus (H)      Ulnar neuropathy of both upper extremities      Vitamin D insufficiency      Past Surgical History:   Procedure Laterality Date     ADRENALECTOMY Left 2015     BIOPSY       BREAST SURGERY  2013     COLONOSCOPY       ENT SURGERY  10/01/2000    Tympanoplasty     IR LIVER BIOPSY PERCUTANEOUS  11/14/2019     PANCREATECTOMY PARTIAL  2015     PERCUTANEOUS BIOPSY LIVER Right 11/14/2019    Procedure: Percutaneous Liver Biopsy;  Surgeon: Sean Guzman PA-C;  Location: [facility] OR     SPLENECTOMY  2015     TONSILLECTOMY  10/01/2000    Tonsillectomy     PERTINENT MEDICATIONS:  Current Outpatient Medications   Medication Sig Dispense Refill     ACCU-CHEK GUIDE test strip Use to test blood sugar 3 times daily or as directed. (Patient not taking: Reported on 3/18/2025) 150 strip 1     acetaminophen (TYLENOL) 500 MG tablet Take 1-2 tablets (500-1,000 mg) by mouth every 6 hours as needed for mild pain. 40 tablet 0     albuterol (VENTOLIN HFA) 108 (90 Base) MCG/ACT inhaler INHALE 2 PUFFS INTO THE LUNGS EVERY SIX  HOURS AS NEEDED FOR SHORTNESS OF BREATH 18 g 5     alendronate (FOSAMAX) 70 MG tablet Take 1 tablet (70 mg) by mouth every 7 days 4 tablet 2     amitriptyline (ELAVIL) 10 MG tablet Take 1 tablet (10 mg) by mouth at bedtime. 90 tablet 1     ARIPiprazole (ABILIFY) 15 MG tablet Take 1 Tablet (15 mg) by mouth once daily in the evening.       atorvastatin (LIPITOR)  "20 MG tablet Take 1 tablet (20 mg) by mouth daily 90 tablet 3     Biotin 5 MG TABS Take 1 tablet by mouth daily 100 tablet 1     blood glucose (ACCU-CHEK GUIDE) test strip Use to test blood sugar 3 times daily or as directed. 100 strip 11     bumetanide (BUMEX) 0.5 MG tablet Take 1 tablet (0.5 mg) by mouth daily 30 tablet 2     carvedilol (COREG) 12.5 MG tablet Take 1 tablet (12.5 mg) by mouth 2 times daily 60 tablet 11     cloZAPine (CLOZARIL) 50 MG tablet Take 150 mg by mouth at bedtime       diclofenac (VOLTAREN) 1 % topical gel Apply 2 g topically 4 times daily (Patient not taking: Reported on 2/26/2025) 150 g 1     docusate sodium (COLACE) 100 MG capsule Take 1 capsule (100 mg) by mouth 2 times daily as needed for constipation. 60 capsule 5     FLUoxetine (PROZAC) 20 MG capsule Take 3 Capsules (60 mg) by mouth once daily in the morning.       glucose (BD GLUCOSE) 4 g chewable tablet Take 1 tablet by mouth every hour as needed for low blood sugar 30 tablet 4     HYDROcodone-acetaminophen (NORCO) 5-325 MG tablet Take 1 tablet by mouth every 6 hours as needed for pain. 20 tablet 0     hydrOXYzine HCl (ATARAX) 25 MG tablet Take 25 mg by mouth 3 times daily as needed for anxiety       insulin aspart (NOVOLOG VIAL) 100 UNITS/ML vial for use with continuous insulin pump  Max TDD 80 150 mL 1     Insulin Infusion Pump (MINIMED 780G INSULIN PUMP) KIT 1 each daily SmartGuard Target: 100; Active Insulin Time: 2 hours (recommended); SmartGuardTM Warmup/Manual Mode: Suspend before low (recommended) 1 kit 0     Insulin Infusion Pump Supplies (EXTENDED INFUSION SET 23\"/6MM) MISC 1 each every 7 days Max Total Daily Dose 70 units; Change infusion set & reservoir every 7 days (recommended) 10 each 6     Insulin Infusion Pump Supplies (EXTENDED RESERVOIR 3ML) MISC 1 each every 7 days 10 each 11     lamoTRIgine (LAMICTAL) 100 MG tablet Take 100 mg by mouth At Bedtime       loratadine (CLARITIN) 10 MG tablet Take 1 tablet (10 mg) by " "mouth every morning 30 tablet 11     meclizine (ANTIVERT) 25 MG tablet Take 1 tablet (25 mg) by mouth 3 times daily as needed for dizziness (Patient not taking: Reported on 2/26/2025) 30 tablet 0     nicotine (NICODERM CQ) 14 MG/24HR 24 hr patch Place 1 patch over 24 hours onto the skin every 24 hours. (Patient not taking: Reported on 2/26/2025) 7 patch 0     nicotine (NICODERM CQ) 21 MG/24HR 24 hr patch Place 1 patch over 24 hours onto the skin every 24 hours. 21 mg for about 2 weeks, then decrease to 14 mg for about 7 days and then decrease to 7 mg for up to 7 days and stop (Patient not taking: Reported on 2/26/2025) 14 patch 0     nicotine (NICODERM CQ) 7 MG/24HR 24 hr patch Place 1 patch over 24 hours onto the skin every 24 hours. (Patient not taking: Reported on 2/14/2025) 7 patch 0     nitroGLYcerin 0.2% ointment Apply 1 click (0.25 g) topically every 24 hours for 14 days 4 g 0     omeprazole (PRILOSEC) 20 MG DR capsule TAKE 1 CAPSULE BY MOUTH EVERY DAY 90 capsule 2     QUEtiapine (SEROQUEL) 25 MG tablet TAKE ONE TABLET BY MOUTH FOUR TIMES DAILY AS NEEDED FOR AGITATION OR HALLUCINATIONS       Suvorexant (BELSOMRA) 10 MG tablet Take 10 mg by mouth at bedtime       topiramate (TOPAMAX) 25 MG tablet Take 1 tablet (25 mg) by mouth 2 times daily. 60 tablet 2     No current facility-administered medications for this visit.     PHYSICAL EXAMINATION:  Virtual visit not vitals or exam   There were no vitals taken for this visit.  Wt Readings from Last 2 Encounters:   02/28/25 108.9 kg (240 lb)   02/26/25 108.9 kg (240 lb)     PERTINENT STUDIES:  A1c 7.9  Cr 1.93 BUN 27        Normal upper endoscopy 2016 with EUS     CT 2/2025  \"IMPRESSION:   1.  The gallbladder is not well distended. No calcified gallstones, biliary dilatation or adjacent inflammation. Heterogeneous hepatic parenchyma with lobulated contour, worrisome for underlying cirrhosis. Tiny focal enhancement in the right hepatic lobe inferiorly is not " "appreciable on current study.  2.  Distal pancreatectomy, left adrenalectomy and splenectomy. Normal pancreatic remnant.  3.  No mechanical bowel obstruction, free gas or free fluid. Normal appendix.  4.  No urinary tract calculi or obstruction. Anteverted uterus containing an IUCD, well-seated.  5.  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     Nausea   Patient's symptoms of morning nausea for 6 months could be multifactorial in etiology. In the past, has been related to migraines. At this time, most likely would be gastroesophageal reflux disease since she is taking omeprazole only in the morning, so we will trial twice daily omeprazole. Differential includes functional dyspepsia for which omeprazole would help, and she is already on neuromodulators. Other etiology may be gastroparesis related to diabetes, would expect this to be more nausea/vomiting after meals, will trial diet changes of this and continue management of diabetes.     - Patient to trial small frequent meals  - Increase omeprazole to 20 mg BID   - H. Pylori serology to rule out prior infection, if positive will consider EGD    RTC 3-4 months.       Patient seen and discussed with attending GI physician, Dr. Souza.     Suleman Fleming MD  Gastroenterology Fellow         Again, thank you for allowing me to participate in the care of your patient.        Sincerely,        Suleman Fleming MD    Electronically signed"

## 2025-03-19 NOTE — PROGRESS NOTES
GI CLINIC VISIT    CC/REFERRING PROVIDER:  Ivelisse Sanchez NP  REASON FOR CONSULTATION: Nausea     HPI: 39 year old female who was last seen in Hepatology clinic 3/10/15 for abnormal liver tests and concern for cirrhosis.     She was referred urgently to general GI clinic for nausea.     Nausea in the morning starting 6 months ago five minutes after taking medications, lasts an hour. Tried taking medications with food. Once a week has vomiting with pills. Has not tried any medications that help. Periumbilical pain with nausea lasts an hour. No radiation of pain. Lying down helps pain, walking around makes pain worse. Some times can feel dizzy when has nausea.     Having loose stools, 12 times a day for a week or so. Changed diet to eat more vegetables and cut out sugar and carbs. At times has constipation, once a month or so. No trouble swallowing or eating. She had a pancreatectomy and splenectomy for large tumor.     She quit alcohol in 2022, and smokes tobacco and vapes. No IV drugs, no marijuana.    ROS: 10pt ROS performed and otherwise negative.    PERTINENT PAST MEDICAL/SURGICAL HISTORY:  Past Medical History:   Diagnosis Date    Acquired asplenia     Acute pain of left knee 03/22/2019    Acute-on-chronic kidney injury 03/22/2019    ARF (acute renal failure) 03/23/2019    Asthma     Bipolar disorder (H)     Cardiac murmur     Cervical high risk HPV (human papillomavirus) test positive 06/20/2017    Chiari malformation type I (H)     Chronic bilateral low back pain without sciatica     Deep venous thrombosis of arm (H) 01/06/2017    ultrasound 1/6/2017-  venous thrombus in the antecubital fossa region and the left forearm as described    Depressive disorder     DVT (deep venous thrombosis) (H)     DVT of upper extremity (deep vein thrombosis) (H) 2021    Esophageal reflux     GERD    Hypertension     Insomnia     Leukocytosis     Mild intermittent asthma     Morbid obesity (H)     Mucinous neoplasm of  pancreas     NAFL (nonalcoholic fatty liver)     Neck pain     Nicotine abuse     Other specified types of schizophrenia, unspecified condition     Schizoaffective disorder, depressive type (H)     Stage 3a chronic kidney disease (CKD) (H)     Type 2 diabetes mellitus (H)     Ulnar neuropathy of both upper extremities     Vitamin D insufficiency      Past Surgical History:   Procedure Laterality Date    ADRENALECTOMY Left 2015    BIOPSY      BREAST SURGERY  2013    COLONOSCOPY      ENT SURGERY  10/01/2000    Tympanoplasty    IR LIVER BIOPSY PERCUTANEOUS  11/14/2019    PANCREATECTOMY PARTIAL  2015    PERCUTANEOUS BIOPSY LIVER Right 11/14/2019    Procedure: Percutaneous Liver Biopsy;  Surgeon: Sean Guzman PA-C;  Location: [facility] OR    SPLENECTOMY  2015    TONSILLECTOMY  10/01/2000    Tonsillectomy     PERTINENT MEDICATIONS:  Current Outpatient Medications   Medication Sig Dispense Refill    ACCU-CHEK GUIDE test strip Use to test blood sugar 3 times daily or as directed. (Patient not taking: Reported on 3/18/2025) 150 strip 1    acetaminophen (TYLENOL) 500 MG tablet Take 1-2 tablets (500-1,000 mg) by mouth every 6 hours as needed for mild pain. 40 tablet 0    albuterol (VENTOLIN HFA) 108 (90 Base) MCG/ACT inhaler INHALE 2 PUFFS INTO THE LUNGS EVERY SIX  HOURS AS NEEDED FOR SHORTNESS OF BREATH 18 g 5    alendronate (FOSAMAX) 70 MG tablet Take 1 tablet (70 mg) by mouth every 7 days 4 tablet 2    amitriptyline (ELAVIL) 10 MG tablet Take 1 tablet (10 mg) by mouth at bedtime. 90 tablet 1    ARIPiprazole (ABILIFY) 15 MG tablet Take 1 Tablet (15 mg) by mouth once daily in the evening.      atorvastatin (LIPITOR) 20 MG tablet Take 1 tablet (20 mg) by mouth daily 90 tablet 3    Biotin 5 MG TABS Take 1 tablet by mouth daily 100 tablet 1    blood glucose (ACCU-CHEK GUIDE) test strip Use to test blood sugar 3 times daily or as directed. 100 strip 11    bumetanide (BUMEX) 0.5 MG tablet Take 1 tablet (0.5 mg) by mouth  "daily 30 tablet 2    carvedilol (COREG) 12.5 MG tablet Take 1 tablet (12.5 mg) by mouth 2 times daily 60 tablet 11    cloZAPine (CLOZARIL) 50 MG tablet Take 150 mg by mouth at bedtime      diclofenac (VOLTAREN) 1 % topical gel Apply 2 g topically 4 times daily (Patient not taking: Reported on 2/26/2025) 150 g 1    docusate sodium (COLACE) 100 MG capsule Take 1 capsule (100 mg) by mouth 2 times daily as needed for constipation. 60 capsule 5    FLUoxetine (PROZAC) 20 MG capsule Take 3 Capsules (60 mg) by mouth once daily in the morning.      glucose (BD GLUCOSE) 4 g chewable tablet Take 1 tablet by mouth every hour as needed for low blood sugar 30 tablet 4    HYDROcodone-acetaminophen (NORCO) 5-325 MG tablet Take 1 tablet by mouth every 6 hours as needed for pain. 20 tablet 0    hydrOXYzine HCl (ATARAX) 25 MG tablet Take 25 mg by mouth 3 times daily as needed for anxiety      insulin aspart (NOVOLOG VIAL) 100 UNITS/ML vial for use with continuous insulin pump  Max TDD 80 150 mL 1    Insulin Infusion Pump (MINIMED 780G INSULIN PUMP) KIT 1 each daily SmartGuard Target: 100; Active Insulin Time: 2 hours (recommended); SmartGuardTM Warmup/Manual Mode: Suspend before low (recommended) 1 kit 0    Insulin Infusion Pump Supplies (EXTENDED INFUSION SET 23\"/6MM) MISC 1 each every 7 days Max Total Daily Dose 70 units; Change infusion set & reservoir every 7 days (recommended) 10 each 6    Insulin Infusion Pump Supplies (EXTENDED RESERVOIR 3ML) MISC 1 each every 7 days 10 each 11    lamoTRIgine (LAMICTAL) 100 MG tablet Take 100 mg by mouth At Bedtime      loratadine (CLARITIN) 10 MG tablet Take 1 tablet (10 mg) by mouth every morning 30 tablet 11    meclizine (ANTIVERT) 25 MG tablet Take 1 tablet (25 mg) by mouth 3 times daily as needed for dizziness (Patient not taking: Reported on 2/26/2025) 30 tablet 0    nicotine (NICODERM CQ) 14 MG/24HR 24 hr patch Place 1 patch over 24 hours onto the skin every 24 hours. (Patient not " "taking: Reported on 2/26/2025) 7 patch 0    nicotine (NICODERM CQ) 21 MG/24HR 24 hr patch Place 1 patch over 24 hours onto the skin every 24 hours. 21 mg for about 2 weeks, then decrease to 14 mg for about 7 days and then decrease to 7 mg for up to 7 days and stop (Patient not taking: Reported on 2/26/2025) 14 patch 0    nicotine (NICODERM CQ) 7 MG/24HR 24 hr patch Place 1 patch over 24 hours onto the skin every 24 hours. (Patient not taking: Reported on 2/14/2025) 7 patch 0    nitroGLYcerin 0.2% ointment Apply 1 click (0.25 g) topically every 24 hours for 14 days 4 g 0    omeprazole (PRILOSEC) 20 MG DR capsule TAKE 1 CAPSULE BY MOUTH EVERY DAY 90 capsule 2    QUEtiapine (SEROQUEL) 25 MG tablet TAKE ONE TABLET BY MOUTH FOUR TIMES DAILY AS NEEDED FOR AGITATION OR HALLUCINATIONS      Suvorexant (BELSOMRA) 10 MG tablet Take 10 mg by mouth at bedtime      topiramate (TOPAMAX) 25 MG tablet Take 1 tablet (25 mg) by mouth 2 times daily. 60 tablet 2     No current facility-administered medications for this visit.     PHYSICAL EXAMINATION:  Virtual visit not vitals or exam   There were no vitals taken for this visit.  Wt Readings from Last 2 Encounters:   02/28/25 108.9 kg (240 lb)   02/26/25 108.9 kg (240 lb)     PERTINENT STUDIES:  A1c 7.9  Cr 1.93 BUN 27        Normal upper endoscopy 2016 with EUS     CT 2/2025  \"IMPRESSION:   1.  The gallbladder is not well distended. No calcified gallstones, biliary dilatation or adjacent inflammation. Heterogeneous hepatic parenchyma with lobulated contour, worrisome for underlying cirrhosis. Tiny focal enhancement in the right hepatic lobe inferiorly is not appreciable on current study.  2.  Distal pancreatectomy, left adrenalectomy and splenectomy. Normal pancreatic remnant.  3.  No mechanical bowel obstruction, free gas or free fluid. Normal appendix.  4.  No urinary tract calculi or obstruction. Anteverted uterus containing an IUCD, well-seated.  5.  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     Nausea   Patient's symptoms of morning nausea for 6 months could be multifactorial in etiology. In the past, has been related to migraines. At this time, most likely would be gastroesophageal reflux disease since she is taking omeprazole only in the morning, so we will trial twice daily omeprazole. Differential includes functional dyspepsia for which omeprazole would help, and she is already on neuromodulators. Other etiology may be gastroparesis related to diabetes, would expect this to be more nausea/vomiting after meals, will trial diet changes of this and continue management of diabetes.     - Patient to trial small frequent meals  - Increase omeprazole to 20 mg BID   - H. Pylori serology to rule out prior infection, if positive will consider EGD    RTC 3-4 months.       Patient seen and discussed with attending GI physician, Dr. Souza.     Suleman Fleming MD  Gastroenterology Fellow       "

## 2025-03-25 ENCOUNTER — LAB (OUTPATIENT)
Dept: LAB | Facility: CLINIC | Age: 40
End: 2025-03-25
Attending: PHYSICIAN ASSISTANT
Payer: COMMERCIAL

## 2025-03-25 ENCOUNTER — TRANSFERRED RECORDS (OUTPATIENT)
Dept: GASTROENTEROLOGY | Facility: CLINIC | Age: 40
End: 2025-03-25

## 2025-03-25 DIAGNOSIS — K76.0 NAFL (NONALCOHOLIC FATTY LIVER): Primary | Chronic | ICD-10-CM

## 2025-03-25 DIAGNOSIS — Z11.59 ENCOUNTER FOR SCREENING FOR OTHER VIRAL DISEASES: ICD-10-CM

## 2025-03-25 DIAGNOSIS — K76.0 HEPATIC STEATOSIS: Primary | ICD-10-CM

## 2025-03-25 DIAGNOSIS — R74.8 ALKALINE PHOSPHATASE ELEVATION: ICD-10-CM

## 2025-03-25 DIAGNOSIS — K74.00 HEPATIC FIBROSIS: ICD-10-CM

## 2025-03-25 DIAGNOSIS — Z87.898 HISTORY OF ALCOHOL USE: ICD-10-CM

## 2025-03-25 DIAGNOSIS — R77.0 LOW SERUM ALBUMIN: ICD-10-CM

## 2025-03-25 LAB
ALBUMIN SERPL BCG-MCNC: 4 G/DL (ref 3.5–5.2)
ALP SERPL-CCNC: 190 U/L (ref 40–150)
ALT SERPL W P-5'-P-CCNC: 31 U/L (ref 0–50)
AST SERPL W P-5'-P-CCNC: 24 U/L (ref 0–45)
BILIRUB DIRECT SERPL-MCNC: 0.14 MG/DL (ref 0–0.3)
BILIRUB SERPL-MCNC: 0.3 MG/DL
HAV AB SER QL IA: NONREACTIVE
HBV SURFACE AB SERPL IA-ACNC: 15 M[IU]/ML
HBV SURFACE AB SERPL IA-ACNC: REACTIVE M[IU]/ML
HCV AB SERPL QL IA: NONREACTIVE
INR PPP: 1.02 (ref 0.85–1.15)
PROT SERPL-MCNC: 8.2 G/DL (ref 6.4–8.3)

## 2025-03-25 PROCEDURE — 86708 HEPATITIS A ANTIBODY: CPT | Performed by: PHYSICIAN ASSISTANT

## 2025-03-25 PROCEDURE — 80076 HEPATIC FUNCTION PANEL: CPT | Performed by: PATHOLOGY

## 2025-03-25 PROCEDURE — 86706 HEP B SURFACE ANTIBODY: CPT | Performed by: PHYSICIAN ASSISTANT

## 2025-03-25 PROCEDURE — 86803 HEPATITIS C AB TEST: CPT | Performed by: PHYSICIAN ASSISTANT

## 2025-03-25 PROCEDURE — 99000 SPECIMEN HANDLING OFFICE-LAB: CPT | Performed by: PATHOLOGY

## 2025-03-25 PROCEDURE — G0480 DRUG TEST DEF 1-7 CLASSES: HCPCS | Mod: 90 | Performed by: PATHOLOGY

## 2025-03-25 PROCEDURE — 36415 COLL VENOUS BLD VENIPUNCTURE: CPT | Performed by: PATHOLOGY

## 2025-03-25 PROCEDURE — 85610 PROTHROMBIN TIME: CPT | Performed by: PATHOLOGY

## 2025-03-27 LAB
LABORATORY REPORT: NORMAL
PETH INTERPRETATION: NORMAL
PLPETH BLD-MCNC: <10 NG/ML
POPETH BLD-MCNC: <10 NG/ML

## 2025-04-01 ENCOUNTER — MYC MEDICAL ADVICE (OUTPATIENT)
Dept: FAMILY MEDICINE | Facility: CLINIC | Age: 40
End: 2025-04-01

## 2025-04-01 ENCOUNTER — OFFICE VISIT (OUTPATIENT)
Dept: PHARMACY | Facility: CLINIC | Age: 40
End: 2025-04-01
Payer: COMMERCIAL

## 2025-04-01 ENCOUNTER — LAB (OUTPATIENT)
Dept: LAB | Facility: CLINIC | Age: 40
End: 2025-04-01
Payer: COMMERCIAL

## 2025-04-01 DIAGNOSIS — M84.68XA PATHOLOGICAL FRACTURE IN OTHER DISEASE, OTHER SITE, INITIAL ENCOUNTER FOR FRACTURE: ICD-10-CM

## 2025-04-01 DIAGNOSIS — G47.00 INSOMNIA, UNSPECIFIED TYPE: ICD-10-CM

## 2025-04-01 DIAGNOSIS — R60.9 EDEMA, UNSPECIFIED TYPE: ICD-10-CM

## 2025-04-01 DIAGNOSIS — Z78.9 TAKES DIETARY SUPPLEMENTS: ICD-10-CM

## 2025-04-01 DIAGNOSIS — K21.9 GASTROESOPHAGEAL REFLUX DISEASE WITHOUT ESOPHAGITIS: ICD-10-CM

## 2025-04-01 DIAGNOSIS — K59.00 CONSTIPATION, UNSPECIFIED CONSTIPATION TYPE: ICD-10-CM

## 2025-04-01 DIAGNOSIS — F32.A DEPRESSIVE DISORDER: ICD-10-CM

## 2025-04-01 DIAGNOSIS — J30.2 SEASONAL ALLERGIC RHINITIS, UNSPECIFIED TRIGGER: ICD-10-CM

## 2025-04-01 DIAGNOSIS — Z79.899 HIGH RISK MEDICATION USE: ICD-10-CM

## 2025-04-01 DIAGNOSIS — F43.10 PTSD (POST-TRAUMATIC STRESS DISORDER): ICD-10-CM

## 2025-04-01 DIAGNOSIS — E78.5 HYPERLIPIDEMIA LDL GOAL <100: ICD-10-CM

## 2025-04-01 DIAGNOSIS — I10 ESSENTIAL HYPERTENSION: ICD-10-CM

## 2025-04-01 DIAGNOSIS — J45.21 MILD INTERMITTENT ASTHMA WITH ACUTE EXACERBATION: ICD-10-CM

## 2025-04-01 DIAGNOSIS — E11.42 TYPE 2 DIABETES MELLITUS WITH DIABETIC POLYNEUROPATHY, WITH LONG-TERM CURRENT USE OF INSULIN (H): ICD-10-CM

## 2025-04-01 DIAGNOSIS — F31.9 BIPOLAR AFFECTIVE DISORDER, REMISSION STATUS UNSPECIFIED (H): ICD-10-CM

## 2025-04-01 DIAGNOSIS — M25.572 ACUTE LEFT ANKLE PAIN: ICD-10-CM

## 2025-04-01 DIAGNOSIS — F25.1 SCHIZOAFFECTIVE DISORDER, DEPRESSIVE TYPE (H): ICD-10-CM

## 2025-04-01 DIAGNOSIS — F60.3 BORDERLINE PERSONALITY DISORDER (H): ICD-10-CM

## 2025-04-01 DIAGNOSIS — Z87.39 HISTORY OF OSTEOPENIA: Primary | ICD-10-CM

## 2025-04-01 DIAGNOSIS — G43.719 INTRACTABLE CHRONIC MIGRAINE WITHOUT AURA AND WITHOUT STATUS MIGRAINOSUS: Primary | ICD-10-CM

## 2025-04-01 DIAGNOSIS — Z79.4 TYPE 2 DIABETES MELLITUS WITH DIABETIC POLYNEUROPATHY, WITH LONG-TERM CURRENT USE OF INSULIN (H): ICD-10-CM

## 2025-04-01 DIAGNOSIS — F25.0 SCHIZOAFFECTIVE DISORDER, BIPOLAR TYPE (H): ICD-10-CM

## 2025-04-01 LAB
BASOPHILS # BLD AUTO: 0.1 10E3/UL (ref 0–0.2)
BASOPHILS NFR BLD AUTO: 1 %
EOSINOPHIL # BLD AUTO: 0.2 10E3/UL (ref 0–0.7)
EOSINOPHIL NFR BLD AUTO: 2 %
ERYTHROCYTE [DISTWIDTH] IN BLOOD BY AUTOMATED COUNT: 14.4 % (ref 10–15)
HCT VFR BLD AUTO: 37.6 % (ref 35–47)
HGB BLD-MCNC: 11.3 G/DL (ref 11.7–15.7)
IMM GRANULOCYTES # BLD: 0 10E3/UL
IMM GRANULOCYTES NFR BLD: 0 %
LYMPHOCYTES # BLD AUTO: 3.5 10E3/UL (ref 0.8–5.3)
LYMPHOCYTES NFR BLD AUTO: 26 %
MCH RBC QN AUTO: 28.3 PG (ref 26.5–33)
MCHC RBC AUTO-ENTMCNC: 30.1 G/DL (ref 31.5–36.5)
MCV RBC AUTO: 94 FL (ref 78–100)
MONOCYTES # BLD AUTO: 0.8 10E3/UL (ref 0–1.3)
MONOCYTES NFR BLD AUTO: 6 %
NEUTROPHILS # BLD AUTO: 8.7 10E3/UL (ref 1.6–8.3)
NEUTROPHILS NFR BLD AUTO: 66 %
PLATELET # BLD AUTO: 429 10E3/UL (ref 150–450)
RBC # BLD AUTO: 4 10E6/UL (ref 3.8–5.2)
WBC # BLD AUTO: 13.3 10E3/UL (ref 4–11)

## 2025-04-01 PROCEDURE — 99605 MTMS BY PHARM NP 15 MIN: CPT | Performed by: PHARMACIST

## 2025-04-01 PROCEDURE — 36415 COLL VENOUS BLD VENIPUNCTURE: CPT

## 2025-04-01 PROCEDURE — 99607 MTMS BY PHARM ADDL 15 MIN: CPT | Performed by: PHARMACIST

## 2025-04-01 PROCEDURE — 85025 COMPLETE CBC W/AUTO DIFF WBC: CPT

## 2025-04-01 NOTE — PATIENT INSTRUCTIONS
"Recommendations from today's MTM visit:                                                    MTM (medication therapy management) is a service provided by a clinical pharmacist designed to help you get the most of out of your medicines.   Today we reviewed what your medicines are for, how to know if they are working, that your medicines are safe and how to make your medicine regimen as easy as possible.      Divina,    1. Start taking topiramate 50 mg at bedtime (take two 25 mg tablets at bedtime). Monitor your dizziness and headaches.     2. Omeprazole was increased to 20 mg twice daily - per GI - prescription sent to [facility].     3. You have an order in Epic for a Dexa Scan - call to schedule an appointment.     Follow-up: Wednesday, May 7th at 12:30 PM    It was great speaking with you today.  I value your experience and would be very thankful for your time in providing feedback in our clinic survey. In the next few days, you may receive an email or text message from [employer] with a link to a survey related to your  clinical pharmacist.\"     To schedule another MTM appointment, please call the clinic directly or you may call the MTM scheduling line at [phone] or toll-free at [phone].     My Clinical Pharmacist's contact information:                                                      Please feel free to contact me with any questions or concerns you have.      Keep up the good work!!    Leana Hollis, PharmD  [phone] in clinic on Tuesday and Wednesday        "

## 2025-04-01 NOTE — LETTER
"Recommended To-Do List      Prepared on: Apr 1, 2025       You can get the best results from your medications by completing the items on this \"To-Do List.\"      Bring your To-Do List when you go to your doctor. And, share it with your family or caregivers.    My To-Do List:  What we talked about: What I should do:   An issue with your medication    Decrease how often you take topiramate (TOPAMAX) - take 2-25 mg tablets in the evening as directed          What we talked about: What I should do:                       "

## 2025-04-01 NOTE — LETTER
_  Medication List        Prepared on: Apr 1, 2025     Bring your Medication List when you go to the doctor, hospital, or   emergency room. And, share it with your family or caregivers.     Note any changes to how you take your medications.  Cross out medications when you no longer use them.    Medication How I take it Why I use it Prescriber   acetaminophen (TYLENOL) 500 MG tablet Take 1-2 tablets (500-1,000 mg) by mouth every 6 hours as needed for mild pain. Pain Kylie Hart PA-C   albuterol (VENTOLIN HFA) 108 (90 Base) MCG/ACT inhaler INHALE 2 PUFFS INTO THE LUNGS EVERY SIX  HOURS AS NEEDED FOR SHORTNESS OF BREATH Mild intermittent asthma with acute exacerbation VERONIQUE Bledsoe CNP   alendronate (FOSAMAX) 70 MG tablet Take 1 tablet (70 mg) by mouth every 7 days Osteoporosis with current pathological fracture with routine healing, unspecified osteoporosis type, subsequent encounter VERONIQUE Bledsoe CNP   amitriptyline (ELAVIL) 10 MG tablet Take 1 tablet (10 mg) by mouth at bedtime. Migraine without status migrainosus, not intractable, unspecified migraine type VERONIQUE Bledsoe CNP   ARIPiprazole (ABILIFY) 15 MG tablet Take 1 Tablet (15 mg) by mouth once daily in the evening. Migraines VERONIQUE Spears CNP   atorvastatin (LIPITOR) 20 MG tablet Take 1 tablet (20 mg) by mouth daily Hyperlipidemia LDL Goal <100 VERONIQUE Bledsoe CNP   Biotin 5 MG TABS Take 1 tablet by mouth daily Hair Loss VERONIQUE Bledsoe CNP   bumetanide (BUMEX) 0.5 MG tablet Take 1 tablet (0.5 mg) by mouth daily Lower Extremity Edema Sánchez Dias,    carvedilol (COREG) 12.5 MG tablet Take 1 tablet (12.5 mg) by mouth 2 times daily Essential Hypertension Sánchez Dias,    cloZAPine (CLOZARIL) 50 MG tablet Take 100 mg by mouth at bedtime. Schizoaffective Disorder VERONIQUE Spears CNP   docusate sodium (COLACE) 100 MG capsule Take 1 capsule (100 mg) by mouth 2  times daily as needed for constipation. Constipation, unspecified constipation type VERONIQUE Bledsoe CNP   FLUoxetine (PROZAC) 20 MG capsule Take 3 Capsules (60 mg) by mouth once daily in the morning. Depression VERONIQUE Spears CNP   HYDROcodone-acetaminophen (NORCO) 5-325 MG tablet Take 1 tablet by mouth every 6 hours as needed for pain. Displaced fracture of anterior process of right calcaneus, initial encounter for closed fracture VERONIQUE Bledsoe CNP   hydrOXYzine HCl (ATARAX) 25 MG tablet Take 25 mg by mouth 3 times daily as needed for anxiety Anxiety VERONIQUE Spears CNP   insulin aspart (NOVOLOG VIAL) 100 UNITS/ML vial for use with continuous insulin pump  Max TDD 80 Type 2 diabetes mellitus with stage 3a chronic kidney disease, with long-term current use of insulin (H) VERONIQUE Bledsoe CNP   lamoTRIgine (LAMICTAL) 100 MG tablet Take 100 mg by mouth At Bedtime Bipolar Disorder VERONIQUE Spears CNP   loratadine (CLARITIN) 10 MG tablet Take 1 tablet (10 mg) by mouth every morning Chronic seasonal allergic rhinitis VERONIQUE Bledsoe CNP   omeprazole (PRILOSEC) 20 MG DR capsule Take 1 capsule (20 mg) by mouth 2 times daily. 30 minutes before meal on empty stomach. Nausea Viktoria Souza MD   QUEtiapine (SEROQUEL) 25 MG tablet TAKE ONE TABLET BY MOUTH FOUR TIMES DAILY AS NEEDED FOR AGITATION OR HALLUCINATIONS Anxiety VERONIQUE Spears CNP   Suvorexant (BELSOMRA) 10 MG tablet Take 10 mg by mouth at bedtime Insomnia VERONIQUE Spears CNP   topiramate (TOPAMAX) 25 MG tablet Take 1 tablet (25 mg) by mouth 2 times daily. Migraine without aura and without status migrainosus, not intractable VERONIQUE Bledsoe CNP         Add new medications, over-the-counter drugs, herbals, vitamins, or  minerals in the blank rows below.    Medication How I take it Why I use it Prescriber                                      Allergies:      -  Oxycodone-acetaminophen - Other (See Comments)        Side effects I have had:      Not on File        Other Information:              My notes and questions:

## 2025-04-01 NOTE — LETTER
April 1, 2025  Divina Delgadillo  [address]  [place] [facility] [address]    Dear Ms. Delgadillo, [facility]     Thank you for talking with me on Apr 1, 2025 about your health and medications. As a follow-up to our conversation, I have included two documents:      Your Recommended To-Do List has steps you should take to get the best results from your medications.  Your Medication List will help you keep track of your medications and how to take them.    If you want to talk about these documents, please call Leana Hollis RPH at phone: [phone], Monday-Friday 8-4:30pm.    I look forward to working with you and your doctors to make sure your medications work well for you.    Sincerely,  Leana Hollis RPH  [facility] Pharmacist, [facility]

## 2025-04-01 NOTE — PROGRESS NOTES
Medication Therapy Management (MTM) Encounter    ASSESSMENT:                            Medication Adherence/Access: No issues identified.    Migraines will try moving topiramate back to the evening - monitor dizziness and headaches.     GERD put on after visit summary that patient has a prescription for increased omeprazole dose for nurse to change.     Hypertension/Edema stable    Hyperlipidemia stable    Type 2 Diabetes A1c <8% - continue working with endocrinology and diabetes education      Bipolar Disorder/Borderline Personality Disorder/PTSD/Depression/Schizoaffective Disorder/Insomnia stable    Supplements stable    Allergies stable    Asthma Per NORA guidelines, patient should have a ICS to use along with albuterol. Patient declined switching to Symbicort to see if her insurance covers it - not using albuterol. ACT at goal.     Fractured feet reminded patient to get a Dexa Scan to see if she needs to be on Fosamax.     Constipation stable    PLAN:                            1. Start taking topiramate 50 mg at bedtime (take two 25 mg tablets at bedtime). Monitor your dizziness and headaches.     2. Omeprazole was increased to 20 mg twice daily per GI - they sent a prescription to [facility].     3. You have an order in Epic for a Dexa Scan - call to schedule an appointment.     Follow-up: Wednesday, May 7th at 12:30 PM    SUBJECTIVE/OBJECTIVE:                          Divina Delgadillo is a 40 year old female seen for a follow-up visit from 5/29/2024.       Reason for visit: follow up MTM visit. Comprehensive Medication Review (CMR)    Allergies/ADRs: Reviewed in chart  Past Medical History: Reviewed in chart  Tobacco: She reports that she has been smoking cigarettes and vaping device. She started smoking about 16 months ago. She has a 0.1 pack-year smoking history. She has been exposed to tobacco smoke. She has never used smokeless tobacco.Nicotine/Tobacco Cessation Plan - patient offered and  declined.  Still smokes cigarettes and vapes  - quit recently for 2 weeks, smoking 2-3 cigarettes a day.  Alcohol: none    Medication Adherence/Access: no issues reported. Has a nurse who sets up her medications.     Migraines   Topiramate 25 mg twice daily   Amitriptyline 10 mg at bedtime     Has vertigo really bad - this is more recent - our last visit patient's dizziness improved with taking topiramate once daily in the evening. Her dose was increased to 50 mg in the evening (from 25 mg) - amitriptyline recently added (2/14/2025) - migraines are doing well, hasn't had a migraine for a month or so. Her topiramate was changed to 25 mg twice daily (after addition of amitriptyline) unable to find when/why this was changed - per PCP note from 2/14/2025 - patient was to continue taking 50 mg in the evening and start amitriptyline.     GERD  Omeprazole 20 mg twice daily    History of an ulcer. Followed by GI - recently increased omeprazole to twice daily (3/19/2025) - patient hasn't started this yet, she has a nurse who sets up her medications and she didn't get an order for this change - has a new prescription at her pharmacy.     Hypertension/Edema  Bumetanide 0.5 mg one daily   Carvedilol 12.5 mg twice daily     Tolerating medications well.     BP Readings from Last 3 Encounters:   02/28/25 110/72   02/26/25 137/78   02/24/25 116/69     Hyperlipidemia:   Atorvastatin 20 mg in the evening.     Patient reports no current medication side effects.     Type 2 Diabetes:    Novolog as needed for pump    Patient reports no current medication side effects. Working with endocrinology and diabetes education.     Blood sugar monitoring: Continuous Glucose Monitor   Current diabetes symptoms: none  Eye exam: up to date  Foot exam: up to date    Bipolar Disorder/Borderline Personality Disorder/PTSD/Depression/Schizoaffective Disorder/Insomnia  Abilify 15 mg daily   Clozapine 100 at bedtime  Fluoxetine 60 mg daily  Hydroxyzine 25  mg three times daily as needed for anxiety   Lamotrigine 100 mg at bedtime  Belsomra 10 mg at bedtime  Seroquel 25 mg as needed    Sleeping really good. Patient is followed by psychiatry - Dr. Onur Ross every 3 months. Has not needed Seroquel recently. Using hydroxyzine about twice a week with relief. Tolerating medications well.     Supplements:  Biotin 5 mg daily    Allergies:  Loratadine 10 mg daily    Allegies are good.     Asthma:   Albuterol inhaler as needed     Using albuterol rarely - about once a year.   Patient reports no current medication side effects.      Patient reports the following symptoms: none.    Fractured feet  Fosamax 70 mg weekly  Acetaminophen 500 mg as needed  Hydrocodone-acetaminophen 5-325 mg as needed    Broke both of her feet getting out of a tub - currently only one of her feet is still in a boot. She was put on Fosamax in the hospital - no history of osteoporosis, PCP asking patient to get a Dexa Scan.     Taking acetaminophen once every couple of days. Taking Norco only if needed (12 pills lasted a month).     She is getting >1200 mg of calcium from diet. Getting 1 cup of yogurt and 4 glasses of milk per day.     Constipation  Docusate 100 mg once daily    Working well, tolerating well.    Today's Vitals: There were no vitals taken for this visit. Patient declined getting her blood pressure checked today.   ----------------      I spent 30 minutes with this patient today. All changes were made via collaborative practice agreement with VERONIQUE Spears CNP.     A summary of these recommendations was given to the patient.    Leana Hollis, PharmD  Medication Therapy Management      Medication Therapy Recommendations  Intractable chronic migraine without aura and without status migrainosus   1 Current Medication: topiramate (TOPAMAX) 25 MG tablet   Current Medication Sig: Take 1 tablet (25 mg) by mouth 2 times daily.   Rationale: Undesirable effect - Adverse medication event  - Safety   Recommendation: Decrease Frequency   Status: Patient Agreed - Adherence/Education   Identified Date: 4/1/2025   Note: take 50 mg once daily in the evening

## 2025-04-02 NOTE — TELEPHONE ENCOUNTER
Jaleesa,    Please see pt's mychart msg.  Last Dexa scan ordered 3/13/24. This was not done & pt will need new order.   I have pended an order for your consideration.    Patrica Schmidt RN

## 2025-04-07 ENCOUNTER — OFFICE VISIT (OUTPATIENT)
Dept: OBGYN | Facility: CLINIC | Age: 40
End: 2025-04-07
Payer: COMMERCIAL

## 2025-04-07 VITALS
TEMPERATURE: 98.5 F | DIASTOLIC BLOOD PRESSURE: 60 MMHG | OXYGEN SATURATION: 95 % | SYSTOLIC BLOOD PRESSURE: 128 MMHG | HEART RATE: 70 BPM | HEIGHT: 63 IN | BODY MASS INDEX: 42.51 KG/M2

## 2025-04-07 DIAGNOSIS — Z30.430 ENCOUNTER FOR INSERTION OF MIRENA IUD: ICD-10-CM

## 2025-04-07 DIAGNOSIS — B96.89 BV (BACTERIAL VAGINOSIS): Primary | ICD-10-CM

## 2025-04-07 DIAGNOSIS — Z30.433 ENCOUNTER FOR REMOVAL AND REINSERTION OF INTRAUTERINE CONTRACEPTIVE DEVICE: Primary | ICD-10-CM

## 2025-04-07 DIAGNOSIS — N76.0 BV (BACTERIAL VAGINOSIS): Primary | ICD-10-CM

## 2025-04-07 LAB
CLUE CELLS: PRESENT
HCG UR QL: NEGATIVE
TRICHOMONAS, WET PREP: ABNORMAL
WBC'S/HIGH POWER FIELD, WET PREP: ABNORMAL
YEAST, WET PREP: ABNORMAL

## 2025-04-07 PROCEDURE — 87210 SMEAR WET MOUNT SALINE/INK: CPT | Performed by: OBSTETRICS & GYNECOLOGY

## 2025-04-07 PROCEDURE — 87491 CHLMYD TRACH DNA AMP PROBE: CPT | Performed by: OBSTETRICS & GYNECOLOGY

## 2025-04-07 PROCEDURE — 3078F DIAST BP <80 MM HG: CPT | Performed by: OBSTETRICS & GYNECOLOGY

## 2025-04-07 PROCEDURE — 81025 URINE PREGNANCY TEST: CPT | Performed by: OBSTETRICS & GYNECOLOGY

## 2025-04-07 PROCEDURE — 58301 REMOVE INTRAUTERINE DEVICE: CPT | Performed by: OBSTETRICS & GYNECOLOGY

## 2025-04-07 PROCEDURE — 87591 N.GONORRHOEAE DNA AMP PROB: CPT | Performed by: OBSTETRICS & GYNECOLOGY

## 2025-04-07 PROCEDURE — 3074F SYST BP LT 130 MM HG: CPT | Performed by: OBSTETRICS & GYNECOLOGY

## 2025-04-07 PROCEDURE — G0145 SCR C/V CYTO,THINLAYER,RESCR: HCPCS | Performed by: OBSTETRICS & GYNECOLOGY

## 2025-04-07 PROCEDURE — 87624 HPV HI-RISK TYP POOLED RSLT: CPT | Performed by: OBSTETRICS & GYNECOLOGY

## 2025-04-07 PROCEDURE — 58300 INSERT INTRAUTERINE DEVICE: CPT | Performed by: OBSTETRICS & GYNECOLOGY

## 2025-04-07 RX ORDER — METRONIDAZOLE 7.5 MG/G
1 GEL VAGINAL DAILY
Qty: 35 G | Refills: 0 | Status: SHIPPED | OUTPATIENT
Start: 2025-04-07 | End: 2025-04-14

## 2025-04-07 NOTE — NURSING NOTE
"Initial /60 (BP Location: Right arm, Patient Position: Chair, Cuff Size: Adult Large)   Pulse 70   Temp 98.5  F (36.9  C) (Tympanic)   Ht 1.6 m (5' 3\")   SpO2 95%   BMI 42.51 kg/m   Estimated body mass index is 42.51 kg/m  as calculated from the following:    Height as of this encounter: 1.6 m (5' 3\").    Weight as of 2/28/25: 108.9 kg (240 lb). .    Jenny Rodriguez, DIEGO    "

## 2025-04-07 NOTE — PROGRESS NOTES
"Chief Complaint   Patient presents with    IUD     Remove/ placement        S: Discussed risks and benefits including failure rates, infection, expulsion, perforation, possible need for surgical removal,  irregular bleeding and cramping. The pamphlet was reviewed and signed.  She is wondering about antianxiety meds today.  She didn't have any difficulty with the IUD placement last time.    O: /60 (BP Location: Right arm, Patient Position: Chair, Cuff Size: Adult Large)   Pulse 70   Temp 98.5  F (36.9  C) (Tympanic)   Ht 1.6 m (5' 3\")   SpO2 95%   BMI 42.51 kg/m      Current Outpatient Medications   Medication Sig Dispense Refill    amitriptyline (ELAVIL) 10 MG tablet Take 1 tablet (10 mg) by mouth at bedtime. 90 tablet 1    ARIPiprazole (ABILIFY) 15 MG tablet Take 1 Tablet (15 mg) by mouth once daily in the evening.      atorvastatin (LIPITOR) 20 MG tablet Take 1 tablet (20 mg) by mouth daily 90 tablet 3    Biotin 5 MG TABS Take 1 tablet by mouth daily 100 tablet 1    carvedilol (COREG) 12.5 MG tablet Take 1 tablet (12.5 mg) by mouth 2 times daily 60 tablet 11    docusate sodium (COLACE) 100 MG capsule Take 1 capsule (100 mg) by mouth 2 times daily as needed for constipation. 60 capsule 5    FLUoxetine (PROZAC) 20 MG capsule Take 3 Capsules (60 mg) by mouth once daily in the morning.      loratadine (CLARITIN) 10 MG tablet Take 1 tablet (10 mg) by mouth every morning 30 tablet 11    omeprazole (PRILOSEC) 20 MG DR capsule Take 1 capsule (20 mg) by mouth 2 times daily. 30 minutes before meal on empty stomach. 90 capsule 2    acetaminophen (TYLENOL) 500 MG tablet Take 1-2 tablets (500-1,000 mg) by mouth every 6 hours as needed for mild pain. (Patient not taking: Reported on 4/7/2025) 40 tablet 0    albuterol (VENTOLIN HFA) 108 (90 Base) MCG/ACT inhaler INHALE 2 PUFFS INTO THE LUNGS EVERY SIX  HOURS AS NEEDED FOR SHORTNESS OF BREATH (Patient not taking: Reported on 4/7/2025) 18 g 5    alendronate (FOSAMAX) 70 " "MG tablet Take 1 tablet (70 mg) by mouth every 7 days (Patient not taking: Reported on 4/7/2025) 4 tablet 2    blood glucose (ACCU-CHEK GUIDE) test strip Use to test blood sugar 3 times daily or as directed. 100 strip 11    bumetanide (BUMEX) 0.5 MG tablet Take 1 tablet (0.5 mg) by mouth daily (Patient not taking: Reported on 4/7/2025) 30 tablet 2    cloZAPine (CLOZARIL) 50 MG tablet Take 100 mg by mouth at bedtime. (Patient not taking: Reported on 4/7/2025)      glucose (BD GLUCOSE) 4 g chewable tablet Take 1 tablet by mouth every hour as needed for low blood sugar 30 tablet 4    HYDROcodone-acetaminophen (NORCO) 5-325 MG tablet Take 1 tablet by mouth every 6 hours as needed for pain. (Patient not taking: Reported on 4/7/2025) 20 tablet 0    hydrOXYzine HCl (ATARAX) 25 MG tablet Take 25 mg by mouth 3 times daily as needed for anxiety (Patient not taking: Reported on 4/7/2025)      insulin aspart (NOVOLOG VIAL) 100 UNITS/ML vial for use with continuous insulin pump  Max TDD 80 (Patient not taking: Reported on 4/7/2025) 150 mL 1    Insulin Infusion Pump (MINIMED 780G INSULIN PUMP) KIT 1 each daily SmartGuard Target: 100; Active Insulin Time: 2 hours (recommended); SmartGuardTM Warmup/Manual Mode: Suspend before low (recommended) 1 kit 0    Insulin Infusion Pump Supplies (EXTENDED INFUSION SET 23\"/6MM) MISC 1 each every 7 days Max Total Daily Dose 70 units; Change infusion set & reservoir every 7 days (recommended) 10 each 6    Insulin Infusion Pump Supplies (EXTENDED RESERVOIR 3ML) MISC 1 each every 7 days 10 each 11    lamoTRIgine (LAMICTAL) 100 MG tablet Take 100 mg by mouth At Bedtime (Patient not taking: Reported on 4/7/2025)      QUEtiapine (SEROQUEL) 25 MG tablet TAKE ONE TABLET BY MOUTH FOUR TIMES DAILY AS NEEDED FOR AGITATION OR HALLUCINATIONS (Patient not taking: Reported on 4/7/2025)      Suvorexant (BELSOMRA) 10 MG tablet Take 10 mg by mouth at bedtime (Patient not taking: Reported on 4/7/2025)      " topiramate (TOPAMAX) 25 MG tablet Take 1 tablet (25 mg) by mouth 2 times daily. (Patient not taking: Reported on 4/7/2025) 60 tablet 2        UPT: neg  GC/CHlam: done today    The patient was placed in the dorsal lithotomy position. A speculum was placed in vagina. The cervix was visualized.  The cervix was cleansed with betadyne x 3.  The IUD strings were grasped and the IUD was removed.  An Allis was placed on anterior lip of cervix.  The uterus sounded to 8 cm.  Mirena IUD insertion device inserted easily into uterus, deployed and the strings released and insertion devise removed.  The strings were trimmed to 3 cm outside of cervix. The Allis was removed and hemostasis was assured.  The patient was instructed on string checks.    A:  (Z30.433) Encounter for removal and reinsertion of intrauterine contraceptive device  (primary encounter diagnosis)    We did discuss options of a cervical block, nitrous when our machine arrives.  Given the option to have her primary care provider prescribe anti-anxiety meds.  As she is on a long list of medications, I didn't feel comfortable adding additional anti-anxiety meds today.  She didn't feel she needed these things and requested to proceed today.       P: The patient is to check monthly for strings.  If unable to palpate, should call for an appointment.  Return to clinic 1 month or prn.    Irina Camejo MD ....................  4/7/2025   1:43 PM

## 2025-04-08 ENCOUNTER — HOSPITAL ENCOUNTER (OUTPATIENT)
Dept: BONE DENSITY | Facility: CLINIC | Age: 40
Discharge: HOME | End: 2025-04-08
Attending: NURSE PRACTITIONER | Admitting: NURSE PRACTITIONER
Payer: COMMERCIAL

## 2025-04-08 DIAGNOSIS — Z87.39 HISTORY OF OSTEOPENIA: ICD-10-CM

## 2025-04-08 DIAGNOSIS — M84.68XA PATHOLOGICAL FRACTURE IN OTHER DISEASE, OTHER SITE, INITIAL ENCOUNTER FOR FRACTURE: ICD-10-CM

## 2025-04-08 LAB
C TRACH DNA SPEC QL PROBE+SIG AMP: NEGATIVE
HPV HR 12 DNA CVX QL NAA+PROBE: NEGATIVE
HPV16 DNA CVX QL NAA+PROBE: NEGATIVE
HPV18 DNA CVX QL NAA+PROBE: NEGATIVE
HUMAN PAPILLOMA VIRUS FINAL DIAGNOSIS: NORMAL
N GONORRHOEA DNA SPEC QL NAA+PROBE: NEGATIVE
SPECIMEN TYPE: NORMAL

## 2025-04-08 PROCEDURE — 77080 DXA BONE DENSITY AXIAL: CPT

## 2025-04-09 ENCOUNTER — PATIENT OUTREACH (OUTPATIENT)
Dept: FAMILY MEDICINE | Facility: CLINIC | Age: 40
End: 2025-04-09

## 2025-04-09 ENCOUNTER — ANCILLARY PROCEDURE (OUTPATIENT)
Dept: GENERAL RADIOLOGY | Facility: CLINIC | Age: 40
End: 2025-04-09
Attending: PODIATRIST
Payer: COMMERCIAL

## 2025-04-09 ENCOUNTER — OFFICE VISIT (OUTPATIENT)
Dept: PODIATRY | Facility: CLINIC | Age: 40
End: 2025-04-09
Payer: COMMERCIAL

## 2025-04-09 VITALS — BODY MASS INDEX: 42.52 KG/M2 | HEIGHT: 63 IN | WEIGHT: 240 LBS

## 2025-04-09 DIAGNOSIS — E11.43 TYPE II DIABETES MELLITUS WITH PERIPHERAL AUTONOMIC NEUROPATHY (H): Primary | ICD-10-CM

## 2025-04-09 DIAGNOSIS — R87.810 CERVICAL HIGH RISK HPV (HUMAN PAPILLOMAVIRUS) TEST POSITIVE: Primary | ICD-10-CM

## 2025-04-09 DIAGNOSIS — G57.71 COMPLEX REGIONAL PAIN SYNDROME TYPE 2 OF RIGHT LOWER EXTREMITY: ICD-10-CM

## 2025-04-09 PROCEDURE — 1125F AMNT PAIN NOTED PAIN PRSNT: CPT | Performed by: PODIATRIST

## 2025-04-09 PROCEDURE — 99213 OFFICE O/P EST LOW 20 MIN: CPT | Performed by: PODIATRIST

## 2025-04-09 PROCEDURE — 73630 X-RAY EXAM OF FOOT: CPT | Mod: TC | Performed by: RADIOLOGY

## 2025-04-09 ASSESSMENT — PAIN SCALES - GENERAL: PAINLEVEL_OUTOF10: SEVERE PAIN (7)

## 2025-04-09 NOTE — PATIENT INSTRUCTIONS
Next Steps:      Support:  Wear supportive shoes, sandals, boots and/or inserts that have a rigid supportive sole.    This will offload the majority of tension forces that travel through your feet every step you take.    Skechers Max Cushioning Elite/Premier   Skechers Relax Fit D'Lux Walker  Reebok Walk Ultra 7 DMX MAX   Hosara Bondi walking shoes  Oofos shoes/sandals (www.Oofos.com)  Haflinger shoes/slippers (https://haflingerusa.Coinplug)  The Efficiency Network (TEN) sandals (https://www.Lattice Incorporated/us)  Superfeet inserts (www.superfeet.com)  It is important that you also wear supportive shoe wear in the house to continue providing support to your feet.    You may always use a cushioned liner for your shoes if that makes your feet feel better.  Stretching  Calf stretching is essential to offload the tension forces that travel through your feet every step you take  Preferred calf stretch is the Runner's Stretch  Place one foot behind the other foot, flat against the ground (it is important to keep the heel on the ground).  The back leg is the one that will be stretched.  Start with the knee straight and lean your hips into the wall, counter or whatever you are leaning into - count to ten.  Next, bend the knee.  You should feel the stretch lower in the calf muscle - count to ten.  Repeat this stretch once an hour to start off with.  When symptoms subside, I recommend performing the stretch 3 times daily to prevent any future problems.                Tissue Massage  It is important that you physically loosen the inflammation tissue to help your body heal the injured tissue.  I recommend soaking your foot in warm water to increase the microcirculation to the soft tissues.  You may add Epson salt to the water if you prefer.  You may apply an over-the-counter muscle rub, such as Voltaren gel, and deeply massage the injured tissue.  Reduce Inflammation  You can ice the injured tissue with an ice pack with a light cloth covering or soaking  in ice water 20 minutes to reduce any acute inflammation, typically at the end of the day.      It is important to understand that most problems that develop in the foot and ankle are caused by excessive tension that cause microinjury to the soft tissues and inflammation in the foot and ankle.  By addressing the underlying causes with support and stretching as well as treating the current inflammatory conditions with tissue massage and anti-inflammatory treatments, most foot and ankle musculoskeletal conditions will resolve.  This may take time to heal.  However, if symptoms persist past 4 weeks you should return to the office for reevaluation to determine further treatment options.

## 2025-04-09 NOTE — Clinical Note
4/9/2025      Divina Delgadillo  [address] [address] [address]      Dear Colleague,    Thank you for referring your patient, Divina Delgadillo, to the [facility]. Please see a copy of my visit note below.    Divina returns to the office for reevaluation of the {RIGHT /LEFT:154881} foot.  The patient relates following the instructions given at the last visit with noted overall {LESS/MORE:582566} pain and {LESS/MORE:199647} improvement in function of the {RIGHT /LEFT:429899} foot.   The patient relates no other problems.      Lower Extremity Physical Exam:      Neurovascular status remains unchanged.  Muscular exam is within normal limits to major muscle groups.  Integument is intact.      Noted ***    Diagnostics:  Radiograph evaluation including three views of the {RIGHT /LEFT:645495} foot reveals interval healing with increased trabeculation of the ***.  I personally evaluated the images as well as reviewed the images with the patient pointing out the findings.      Assessment:     ICD-10-CM    1. Type II diabetes mellitus with peripheral autonomic neuropathy (H)  E11.43       2. Closed displaced fracture of navicular bone of right foot, initial encounter  S92.251A           Plan:    I have explained to Divina about the progress of the conditions.  At this time, ***    Divina verbalized agreement with and understanding of the rational for the diagnosis and treatment plan.  All questions were answered to best of my ability and the patient's satisfaction. The patient was advised to contact the clinic with any questions that may arise after the clinic visit.      Disclaimer: This note consists of symbols derived from keyboarding, dictation and/or voice recognition software. As a result, there may be errors in the script that have gone undetected. Please consider this when interpreting information found in this chart.       ERNA Douglas D.P.M.,  F.LELO.F.A.S.        Again, thank you for allowing me to participate in the care of your patient.        Sincerely,        Raul Douglas DPM    Electronically signed

## 2025-04-09 NOTE — NURSING NOTE
"Chief Complaint   Patient presents with    RECHECK     Right foot- doesn't feel as if its getting any better       Initial Ht 1.6 m (5' 3\")   Wt 108.9 kg (240 lb)   BMI 42.51 kg/m   Estimated body mass index is 42.51 kg/m  as calculated from the following:    Height as of this encounter: 1.6 m (5' 3\").    Weight as of this encounter: 108.9 kg (240 lb).  Medications and allergies reviewed.      Pamela COOK MA    " warm/dry

## 2025-04-09 NOTE — PROGRESS NOTES
Divina returns to the office for reevaluation of the {RIGHT /LEFT:355366} foot.  The patient relates following the instructions given at the last visit with noted overall {LESS/MORE:925211} pain and {LESS/MORE:371608} improvement in function of the {RIGHT /LEFT:043796} foot.   The patient relates no other problems.      Lower Extremity Physical Exam:      Neurovascular status remains unchanged.  Muscular exam is within normal limits to major muscle groups.  Integument is intact.      Noted ***    Diagnostics:  Radiograph evaluation including three views of the {RIGHT /LEFT:233158} foot reveals interval healing with increased trabeculation of the ***.  I personally evaluated the images as well as reviewed the images with the patient pointing out the findings.      Assessment:     ICD-10-CM    1. Type II diabetes mellitus with peripheral autonomic neuropathy (H)  E11.43       2. Closed displaced fracture of navicular bone of right foot, initial encounter  S92.251A           Plan:    I have explained to Divina about the progress of the conditions.  At this time, ***    Divina verbalized agreement with and understanding of the rational for the diagnosis and treatment plan.  All questions were answered to best of my ability and the patient's satisfaction. The patient was advised to contact the clinic with any questions that may arise after the clinic visit.      Disclaimer: This note consists of symbols derived from keyboarding, dictation and/or voice recognition software. As a result, there may be errors in the script that have gone undetected. Please consider this when interpreting information found in this chart.       ERNA Douglas D.P.M., F.ANGELES.CARYN.[email or web address].S.

## 2025-04-10 LAB
BKR AP ASSOCIATED HPV REPORT: NORMAL
BKR LAB AP GYN ADEQUACY: NORMAL
BKR LAB AP GYN INTERPRETATION: NORMAL
BKR LAB AP PREVIOUS ABNL DX: NORMAL
BKR LAB AP PREVIOUS ABNORMAL: NORMAL
PATH REPORT.COMMENTS IMP SPEC: NORMAL
PATH REPORT.COMMENTS IMP SPEC: NORMAL
PATH REPORT.RELEVANT HX SPEC: NORMAL

## 2025-04-16 DIAGNOSIS — E78.5 HYPERLIPIDEMIA LDL GOAL <100: ICD-10-CM

## 2025-04-16 RX ORDER — ATORVASTATIN CALCIUM 20 MG/1
20 TABLET, FILM COATED ORAL DAILY
Qty: 30 TABLET | Refills: 0 | Status: SHIPPED | OUTPATIENT
Start: 2025-04-16

## 2025-04-16 NOTE — TELEPHONE ENCOUNTER
Prescription approved per [facility] Refill Protocol.  Julie Behrendt RN

## 2025-04-17 ENCOUNTER — OFFICE VISIT (OUTPATIENT)
Dept: PODIATRY | Facility: CLINIC | Age: 40
End: 2025-04-17
Payer: COMMERCIAL

## 2025-04-17 DIAGNOSIS — M76.71 PERONEAL TENDINITIS OF RIGHT LOWER LEG: ICD-10-CM

## 2025-04-17 DIAGNOSIS — E11.43 TYPE II DIABETES MELLITUS WITH PERIPHERAL AUTONOMIC NEUROPATHY (H): Primary | ICD-10-CM

## 2025-04-17 DIAGNOSIS — G57.71 COMPLEX REGIONAL PAIN SYNDROME TYPE 2 OF RIGHT LOWER EXTREMITY: ICD-10-CM

## 2025-04-17 DIAGNOSIS — S92.022K CLOSED DISPLACED FRACTURE OF ANTERIOR PROCESS OF LEFT CALCANEUS WITH NONUNION, SUBSEQUENT ENCOUNTER: ICD-10-CM

## 2025-04-17 PROCEDURE — 1125F AMNT PAIN NOTED PAIN PRSNT: CPT | Performed by: PODIATRIST

## 2025-04-17 PROCEDURE — 99213 OFFICE O/P EST LOW 20 MIN: CPT | Performed by: PODIATRIST

## 2025-04-17 RX ORDER — GABAPENTIN 300 MG/1
300 CAPSULE ORAL 3 TIMES DAILY
Qty: 90 CAPSULE | Refills: 0 | Status: SHIPPED | OUTPATIENT
Start: 2025-04-17 | End: 2025-05-01 | Stop reason: SINTOL

## 2025-04-17 ASSESSMENT — PAIN SCALES - GENERAL: PAINLEVEL_OUTOF10: SEVERE PAIN (8)

## 2025-04-17 NOTE — PATIENT INSTRUCTIONS

## 2025-04-17 NOTE — Clinical Note
4/17/2025      Divina Delgadillo  [address] [address] [address]      Dear Colleague,    Thank you for referring your patient, Divina Delgadillo, to the [facility] [place]. Please see a copy of my visit note below.    Divina returns to the office for reevaluation of the {RIGHT /LEFT:770105} foot.  The patient relates following the instructions given at the last visit with noted overall {LESS/MORE:406308} pain and {LESS/MORE:677641} improvement in function of the {RIGHT /LEFT:229145} foot.   The patient relates no other problems.      Lower Extremity Physical Exam:      Neurovascular status remains unchanged.  Muscular exam is within normal limits to major muscle groups.  Integument is intact.      Noted ***    Diagnostics:  Radiograph evaluation including three views of the {RIGHT /LEFT:604370} foot reveals interval healing with increased trabeculation of the ***.  I personally evaluated the images as well as reviewed the images with the patient pointing out the findings.      Assessment:     ICD-10-CM    1. Type II diabetes mellitus with peripheral autonomic neuropathy (H)  E11.43       2. Complex regional pain syndrome type 2 of right lower extremity  G57.71       3. 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  At this time, ***    Divina verbalized agreement with and understanding of the rational for the diagnosis and treatment plan.  All questions were answered to best of my ability and the patient's satisfaction. The patient was advised to contact the clinic with any questions that may arise after the clinic visit.      Disclaimer: This note consists of symbols derived from keyboarding, dictation and/or voice recognition software. As a result, there may be errors in the script that have gone undetected. Please consider this when  interpreting information found in this chart.       ERNA Douglas D.P.M., FALIYAH.F.A.S.        Again, thank you for allowing me to participate in the care of your patient.        Sincerely,        Raul Douglas DPM    Electronically signed

## 2025-04-17 NOTE — PROGRESS NOTES
Divina returns to the office for reevaluation of the {RIGHT /LEFT:472371} foot.  The patient relates following the instructions given at the last visit with noted overall {LESS/MORE:358221} pain and {LESS/MORE:731214} improvement in function of the {RIGHT /LEFT:345658} foot.   The patient relates no other problems.      Lower Extremity Physical Exam:      Neurovascular status remains unchanged.  Muscular exam is within normal limits to major muscle groups.  Integument is intact.      Noted ***    Diagnostics:  Radiograph evaluation including three views of the {RIGHT /LEFT:248666} foot reveals interval healing with increased trabeculation of the ***.  I personally evaluated the images as well as reviewed the images with the patient pointing out the findings.      Assessment:     ICD-10-CM    1. Type II diabetes mellitus with peripheral autonomic neuropathy (H)  E11.43       2. Complex regional pain syndrome type 2 of right lower extremity  G57.71       3. 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  At this time, ***    Divina verbalized agreement with and understanding of the rational for the diagnosis and treatment plan.  All questions were answered to best of my ability and the patient's satisfaction. The patient was advised to contact the clinic with any questions that may arise after the clinic visit.      Disclaimer: This note consists of symbols derived from keyboarding, dictation and/or voice recognition software. As a result, there may be errors in the script that have gone undetected. Please consider this when interpreting information found in this chart.       ERNA Douglas D.P.M., FALIYAH.F.A.S.     and treatment plan.  All questions were answered to best of my ability and the patient's satisfaction. The patient was advised to contact the clinic with any questions that may arise after the clinic visit.      Disclaimer: This note consists of symbols derived from keyboarding, dictation and/or voice recognition software. As a result, there may be errors in the script that have gone undetected. Please consider this when interpreting information found in this chart.       ERNA Douglas D.P.M., F.[email or web address].[email or web address].S.

## 2025-04-20 ENCOUNTER — APPOINTMENT (OUTPATIENT)
Dept: GENERAL RADIOLOGY | Facility: CLINIC | Age: 40
End: 2025-04-20
Attending: EMERGENCY MEDICINE
Payer: COMMERCIAL

## 2025-04-20 ENCOUNTER — HOSPITAL ENCOUNTER (EMERGENCY)
Facility: CLINIC | Age: 40
Discharge: HOME | End: 2025-04-20
Attending: EMERGENCY MEDICINE | Admitting: EMERGENCY MEDICINE
Payer: COMMERCIAL

## 2025-04-20 VITALS
DIASTOLIC BLOOD PRESSURE: 65 MMHG | SYSTOLIC BLOOD PRESSURE: 163 MMHG | HEART RATE: 89 BPM | HEIGHT: 63 IN | RESPIRATION RATE: 18 BRPM | WEIGHT: 240 LBS | OXYGEN SATURATION: 88 % | TEMPERATURE: 99.4 F | BODY MASS INDEX: 42.52 KG/M2

## 2025-04-20 DIAGNOSIS — S83.92XA SPRAIN OF LEFT KNEE, UNSPECIFIED LIGAMENT, INITIAL ENCOUNTER: ICD-10-CM

## 2025-04-20 PROCEDURE — 73560 X-RAY EXAM OF KNEE 1 OR 2: CPT | Mod: LT

## 2025-04-20 PROCEDURE — 99283 EMERGENCY DEPT VISIT LOW MDM: CPT | Performed by: EMERGENCY MEDICINE

## 2025-04-20 PROCEDURE — 250N000013 HC RX MED GY IP 250 OP 250 PS 637: Performed by: EMERGENCY MEDICINE

## 2025-04-20 PROCEDURE — 73552 X-RAY EXAM OF FEMUR 2/>: CPT | Mod: LT

## 2025-04-20 RX ORDER — HYDROCODONE BITARTRATE AND ACETAMINOPHEN 5; 325 MG/1; MG/1
1-2 TABLET ORAL EVERY 6 HOURS PRN
Qty: 10 TABLET | Refills: 0 | Status: SHIPPED | OUTPATIENT
Start: 2025-04-20

## 2025-04-20 RX ORDER — HYDROCODONE BITARTRATE AND ACETAMINOPHEN 5; 325 MG/1; MG/1
1 TABLET ORAL ONCE
Status: DISCONTINUED | OUTPATIENT
Start: 2025-04-20 | End: 2025-04-20

## 2025-04-20 RX ORDER — HYDROCODONE BITARTRATE AND ACETAMINOPHEN 5; 325 MG/1; MG/1
2 TABLET ORAL ONCE
Status: COMPLETED | OUTPATIENT
Start: 2025-04-20 | End: 2025-04-20

## 2025-04-20 RX ADMIN — HYDROCODONE BITARTRATE AND ACETAMINOPHEN 2 TABLET: 5; 325 TABLET ORAL at 19:30

## 2025-04-20 ASSESSMENT — ACTIVITIES OF DAILY LIVING (ADL)
ADLS_ACUITY_SCORE: 42

## 2025-04-20 ASSESSMENT — COLUMBIA-SUICIDE SEVERITY RATING SCALE - C-SSRS
1. IN THE PAST MONTH, HAVE YOU WISHED YOU WERE DEAD OR WISHED YOU COULD GO TO SLEEP AND NOT WAKE UP?: YES
2. HAVE YOU ACTUALLY HAD ANY THOUGHTS OF KILLING YOURSELF IN THE PAST MONTH?: NO
6. HAVE YOU EVER DONE ANYTHING, STARTED TO DO ANYTHING, OR PREPARED TO DO ANYTHING TO END YOUR LIFE?: YES

## 2025-04-20 NOTE — ED TRIAGE NOTES
"Patient here after falling around 4pm from standing onto her left hip/buttock. Has a history of rods placed on the left side due to a fall when she broke her femur \"right in the middle.\" Started on gabapentin x2 days ago and thinks this contributed to dizziness/vertigo, making her fall.      Triage Assessment (Adult)       Row Name 04/20/25 [id number]          Triage Assessment    Airway WDL WDL        Respiratory WDL    Respiratory WDL WDL        Skin Circulation/Temperature WDL    Skin Circulation/Temperature WDL WDL        Cardiac WDL    Cardiac WDL WDL        Peripheral/Neurovascular WDL    Peripheral Neurovascular WDL X;neurovascular assessment lower        Cognitive/Neuro/Behavioral WDL    Cognitive/Neuro/Behavioral WDL WDL        LLE Neurovascular Assessment    Temperature LLE warm     Color LLE no discoloration     Sensation LLE numbness present;tingling present        RLE Neurovascular Assessment    Temperature RLE warm     Color RLE no discoloration     Sensation RLE numbness present;tingling present                     "

## 2025-04-21 ENCOUNTER — PRE VISIT (OUTPATIENT)
Dept: ONCOLOGY | Facility: CLINIC | Age: 40
End: 2025-04-21
Payer: COMMERCIAL

## 2025-04-21 ENCOUNTER — ONCOLOGY VISIT (OUTPATIENT)
Dept: ONCOLOGY | Facility: CLINIC | Age: 40
End: 2025-04-21
Attending: INTERNAL MEDICINE
Payer: COMMERCIAL

## 2025-04-21 VITALS
SYSTOLIC BLOOD PRESSURE: 172 MMHG | WEIGHT: 236.9 LBS | OXYGEN SATURATION: 95 % | HEART RATE: 72 BPM | DIASTOLIC BLOOD PRESSURE: 52 MMHG | BODY MASS INDEX: 41.96 KG/M2 | RESPIRATION RATE: 18 BRPM | TEMPERATURE: 99.3 F

## 2025-04-21 DIAGNOSIS — D72.829 LEUKOCYTOSIS, UNSPECIFIED TYPE: ICD-10-CM

## 2025-04-21 DIAGNOSIS — Z72.0 TOBACCO ABUSE: Primary | ICD-10-CM

## 2025-04-21 PROCEDURE — 99204 OFFICE O/P NEW MOD 45 MIN: CPT | Performed by: INTERNAL MEDICINE

## 2025-04-21 ASSESSMENT — PAIN SCALES - GENERAL: PAINLEVEL_OUTOF10: SEVERE PAIN (7)

## 2025-04-21 NOTE — PROGRESS NOTES
"Oncology Rooming Note    April 21, 2025 3:08 PM   Divina Delgadillo is a 40 year old female who presents for:    Chief Complaint   Patient presents with    Hematology     Leukocytosis     Initial Vitals: BP (!) 172/52 (BP Location: Right arm, Patient Position: Sitting, Cuff Size: Adult Regular)   Pulse 72   Temp 99.3  F (37.4  C) (Temporal)   Resp 18   Wt 107.5 kg (236 lb 14.4 oz)   SpO2 95%   BMI 41.96 kg/m   Estimated body mass index is 41.96 kg/m  as calculated from the following:    Height as of 4/20/25: 1.6 m (5' 3\").    Weight as of this encounter: 107.5 kg (236 lb 14.4 oz). Body surface area is 2.19 meters squared.  Severe Pain (7) Comment: Data Unavailable   No LMP recorded. (Menstrual status: IUD).  Allergies reviewed: Yes  Medications reviewed: Yes    Medications: Medication refills not needed today.  Pharmacy name entered into EPIC:    MAIL ORDER - NO PHONE  PHARMACARE - [facility] - [place], [place] - [address] [facility] - [place], [place] - [address]  [facility] - [place], [place] - [address] [facility]    Frailty Screening:   Is the patient here for a new oncology consult visit in cancer care? 2. No    PHQ9:  Did this patient require a PHQ9?: No      Clinical concerns: New visit for leukocytosis.      Jenny Bradford RN              "

## 2025-04-21 NOTE — PROGRESS NOTES
[facility] Hematology and Oncology Consult Note    Patient: Divina Delgadillo  MRN: [id number]  Date of Service: Apr 21, 2025       Reason for Visit    I was consulted by Sánchez Dias DO for evaluation of chronic leukocytosis.      Encounter Diagnoses Assessment and Plan:    Problem List Items Addressed This Visit       Tobacco abuse - Primary    Leukocytosis, unspecified type     Patient with elevated white blood cell count since at least 2015.  Results have varied between 13 and 17,000.  These findings are most consistent with tobacco abuse.  Which leads to persistent inflammation.  This is supported by the elevated sedimentation rate and CRP noted in July 2024.  She also had investigation of this problem in 2018 and there was no evidence of myeloproliferative disorder.  She has other comorbidities that will also increase the white blood cell count particularly morbid obesity.  No further hematologic investigation is required.  No follow-up with hematology as needed.      ______________________________________________________________________________        History of Present Illness    Ms. Divina Delgadillo is a 40 year old woman with multiple comorbidities including partial pancreatectomy, diabetes mellitus, obesity chronic renal disease, chronic kidney disease and tobacco abuse.  She has a elevated leukocytes since 2015.  They have ranged between 13 and 17,000 for the most part.  Differential shows mature white blood cells.  She has not noted unexplained chills, fevers or sweats.  She does note some difficulty breathing after swallowing.  Presently she is smoking 3 to 4 cigarettes/day.  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  She is taking gabapentin for peripheral neuropathy and does notice some improvement.    Review of systems.  Pertinent Findings are included in the History of Present Illness    Physical Exam    BP (!) 172/52 (BP Location: Right arm, Patient Position: Sitting,  Cuff Size: Adult Regular)   Pulse 72   Temp 99.3  F (37.4  C) (Temporal)   Resp 18   Wt 107.5 kg (236 lb 14.4 oz)   SpO2 95%   BMI 41.96 kg/m       GENERAL APPEARANCE: 40-year-old woman in no apparent distress.  HEAD: Atraumatic; normocephalic; without lesions.  EYES: Conjunctiva, corneas and eyelids normal; pupils equal, round, reactive to light; No Icterus.  MOUTH/OROPHARYNX: Oral mucosa intact upper and lower dentures  NECK: Supple with no nodes.  LUNGS:  Clear to auscultation and percussion with no extra sounds.  HEART: Regular rhythm and rate; S1 and S2 normal; no murmurs noted.  ABDOMEN: Soft; no masses or tenderness, no hepatosplenomegaly.  Body habitus limits exam  NEUROLOGIC: Alert and oriented.  No obvious focal findings.  EXTREMITIES: No cyanosis, or edema.  SKIN: No abnormal bruising or bleeding. No suspicious lesions noted on exposed skin.  PSYCHIATRIC: Mental status normal; no apparent psychiatric issues    Medications:    Current Outpatient Medications   Medication Sig Dispense Refill    amitriptyline (ELAVIL) 10 MG tablet Take 1 tablet (10 mg) by mouth at bedtime. 90 tablet 1    ARIPiprazole (ABILIFY) 15 MG tablet Take 1 Tablet (15 mg) by mouth once daily in the evening.      atorvastatin (LIPITOR) 20 MG tablet Take 1 tablet (20 mg) by mouth daily 30 tablet 0    Biotin 5 MG TABS Take 1 tablet by mouth daily 100 tablet 1    blood glucose (ACCU-CHEK GUIDE) test strip Use to test blood sugar 3 times daily or as directed. 100 strip 11    carvedilol (COREG) 12.5 MG tablet Take 1 tablet (12.5 mg) by mouth 2 times daily 60 tablet 11    docusate sodium (COLACE) 100 MG capsule Take 1 capsule (100 mg) by mouth 2 times daily as needed for constipation. 60 capsule 5    FLUoxetine (PROZAC) 20 MG capsule Take 3 Capsules (60 mg) by mouth once daily in the morning.      gabapentin (NEURONTIN) 300 MG capsule Take 1 capsule (300 mg) by mouth 3 times daily. 90 capsule 0    omeprazole (PRILOSEC) 20 MG DR capsule  "Take 1 capsule (20 mg) by mouth 2 times daily. 30 minutes before meal on empty stomach. 90 capsule 2    acetaminophen (TYLENOL) 500 MG tablet Take 1-2 tablets (500-1,000 mg) by mouth every 6 hours as needed for mild pain. (Patient not taking: Reported on 4/7/2025) 40 tablet 0    albuterol (VENTOLIN HFA) 108 (90 Base) MCG/ACT inhaler INHALE 2 PUFFS INTO THE LUNGS EVERY SIX  HOURS AS NEEDED FOR SHORTNESS OF BREATH (Patient not taking: Reported on 4/7/2025) 18 g 5    bumetanide (BUMEX) 0.5 MG tablet Take 1 tablet (0.5 mg) by mouth daily (Patient not taking: Reported on 4/21/2025) 30 tablet 2    cloZAPine (CLOZARIL) 50 MG tablet Take 100 mg by mouth at bedtime. (Patient not taking: Reported on 4/21/2025)      glucose (BD GLUCOSE) 4 g chewable tablet Take 1 tablet by mouth every hour as needed for low blood sugar 30 tablet 4    HYDROcodone-acetaminophen (NORCO) 5-325 MG tablet Take 1-2 tablets by mouth every 6 hours as needed for severe pain. 10 tablet 0    hydrOXYzine HCl (ATARAX) 25 MG tablet Take 25 mg by mouth 3 times daily as needed for anxiety (Patient not taking: Reported on 4/21/2025)      insulin aspart (NOVOLOG VIAL) 100 UNITS/ML vial for use with continuous insulin pump  Max TDD 80 (Patient not taking: Reported on 4/21/2025) 150 mL 1    Insulin Infusion Pump (MINIMED 780G INSULIN PUMP) KIT 1 each daily SmartGuard Target: 100; Active Insulin Time: 2 hours (recommended); SmartGuardTM Warmup/Manual Mode: Suspend before low (recommended) 1 kit 0    Insulin Infusion Pump Supplies (EXTENDED INFUSION SET 23\"/6MM) MISC 1 each every 7 days Max Total Daily Dose 70 units; Change infusion set & reservoir every 7 days (recommended) 10 each 6    Insulin Infusion Pump Supplies (EXTENDED RESERVOIR 3ML) MISC 1 each every 7 days 10 each 11    lamoTRIgine (LAMICTAL) 100 MG tablet Take 100 mg by mouth At Bedtime (Patient not taking: Reported on 4/21/2025)      levonorgestrel (MIRENA) 52 MG (20 mcg/day) IUD 1 each by Intrauterine " route once.      loratadine (CLARITIN) 10 MG tablet Take 1 tablet (10 mg) by mouth every morning 30 tablet 11    QUEtiapine (SEROQUEL) 25 MG tablet TAKE ONE TABLET BY MOUTH FOUR TIMES DAILY AS NEEDED FOR AGITATION OR HALLUCINATIONS (Patient not taking: Reported on 4/1/2025)      Suvorexant (BELSOMRA) 10 MG tablet Take 10 mg by mouth at bedtime (Patient not taking: Reported on 4/21/2025)      topiramate (TOPAMAX) 25 MG tablet Take 1 tablet (25 mg) by mouth 2 times daily. (Patient not taking: Reported on 4/21/2025) 60 tablet 2     Current Facility-Administered Medications   Medication Dose Route Frequency Provider Last Rate Last Admin    levonorgestrel (MIRENA) 52 MG (20 mcg/day) IUD 1 each  1 each Intrauterine See Admin Instructions Irina Camejo MD   1 each at 04/07/25 1413           Past History    Past Medical History:   Diagnosis Date    Acquired asplenia     Acute pain of left knee 03/22/2019    Acute-on-chronic kidney injury 03/22/2019    ARF (acute renal failure) 03/23/2019    Asthma     Bipolar disorder (H)     Cardiac murmur     Cervical high risk HPV (human papillomavirus) test positive 06/20/2017    Chiari malformation type I (H)     Chronic bilateral low back pain without sciatica     Deep venous thrombosis of arm (H) 01/06/2017    ultrasound 1/6/2017-  venous thrombus in the antecubital fossa region and the left forearm as described    Depressive disorder     DVT (deep venous thrombosis) (H)     DVT of upper extremity (deep vein thrombosis) (H) 2021    Esophageal reflux     GERD    Hypertension     Insomnia     Leukocytosis     Mild intermittent asthma     Morbid obesity (H)     Mucinous neoplasm of pancreas     NAFL (nonalcoholic fatty liver)     Neck pain     Nicotine abuse     Other specified types of schizophrenia, unspecified condition     Schizoaffective disorder, depressive type (H)     Stage 3a chronic kidney disease (CKD) (H)     Type 2 diabetes mellitus (H)     Ulnar neuropathy of  both upper extremities     Vitamin D insufficiency      Past Surgical History:   Procedure Laterality Date    ADRENALECTOMY Left 2015    BIOPSY      BREAST SURGERY  2013    COLONOSCOPY      ENT SURGERY  10/01/2000    Tympanoplasty    IR LIVER BIOPSY PERCUTANEOUS  11/14/2019    PANCREATECTOMY PARTIAL  2015    PERCUTANEOUS BIOPSY LIVER Right 11/14/2019    Procedure: Percutaneous Liver Biopsy;  Surgeon: Sean Guzman PA-C;  Location: [facility] OR    SPLENECTOMY  2015    TONSILLECTOMY  10/01/2000    Tonsillectomy     Allergies   Allergen Reactions    Oxycodone-Acetaminophen Other (See Comments)     weston Bustillos     Family History   Problem Relation Age of Onset    Respiratory Mother         ASTHMA    Allergies Mother     Depression Mother     Chronic Obstructive Pulmonary Disease Mother     Anxiety Disorder Mother     Mental Illness Mother     Asthma Mother     Heart Disease Father         HEART VALVE REPLACEMENT    Hypertension Father     Diabetes Father     Depression Father     Anxiety Disorder Father     Mental Illness Father     Obesity Father     Respiratory Sister     Allergies Sister     Depression Sister     Respiratory Sister     Allergies Sister     Depression Sister     Respiratory Brother     Allergies Brother     Sleep Apnea Maternal Grandfather     Kidney Disease No family hx of     Liver Disease No family hx of     Liver Cancer No family hx of      Social History     Socioeconomic History    Marital status: Single     Spouse name: None    Number of children: 0    Years of education: None    Highest education level: None   Tobacco Use    Smoking status: Every Day     Current packs/day: 0.50     Average packs/day: 0.5 packs/day for 1.4 years (0.7 ttl pk-yrs)     Types: Cigarettes, Vaping Device     Start date: 11/27/2023     Passive exposure: Yes    Smokeless tobacco: Never    Tobacco comments:     E- cig and cigarettes (2-3 cigarettes per day)   Vaping Use    Vaping status: Every Day    Substances:  Nicotine, Flavoring    Devices: Disposable   Substance and Sexual Activity    Alcohol use: No     Comment: sober since 2022    Drug use: No    Sexual activity: Yes     Partners: Female, Male     Birth control/protection: I.U.D.     Comment: Both male and female    Other Topics Concern    Parent/sibling w/ CABG, MI or angioplasty before 65F 55M? No     Social Drivers of Health     Financial Resource Strain: Low Risk  (1/2/2025)    Financial Resource Strain     Within the past 12 months, have you or your family members you live with been unable to get utilities (heat, electricity) when it was really needed?: No   Food Insecurity: Low Risk  (1/2/2025)    Food Insecurity     Within the past 12 months, did you worry that your food would run out before you got money to buy more?: No     Within the past 12 months, did the food you bought just not last and you didn t have money to get more?: No   Transportation Needs: Low Risk  (1/2/2025)    Transportation Needs     Within the past 12 months, has lack of transportation kept you from medical appointments, getting your medicines, non-medical meetings or appointments, work, or from getting things that you need?: No   Physical Activity: Inactive (1/2/2025)    Exercise Vital Sign     Days of Exercise per Week: 1 day     Minutes of Exercise per Session: 0 min   Stress: Stress Concern Present (1/2/2025)    [place] [employer] - Occupational Stress Questionnaire     Feeling of Stress : To some extent   Social Connections: Unknown (1/2/2025)    Social Connection and Isolation Panel [NHANES]     Frequency of Social Gatherings with Friends and Family: More than three times a week   Interpersonal Safety: Low Risk  (2/14/2025)    Interpersonal Safety     Do you feel physically and emotionally safe where you currently live?: Yes     Within the past 12 months, have you been hit, slapped, kicked or otherwise physically hurt by someone?: No     Within the past 12  months, have you been humiliated or emotionally abused in other ways by your partner or ex-partner?: No   Housing Stability: Low Risk  (1/2/2025)    Housing Stability     Do you have housing? : Yes     Are you worried about losing your housing?: No           Lab Results    No results found for this or any previous visit (from the past 240 hours).    Imaging Results    XR Femur Left 2 Views    Result Date: 4/20/2025  EXAM: XR FEMUR LEFT 2 VIEWS, XR KNEE LEFT 1/2 VIEWS LOCATION: [facility] DATE: 4/20/2025 INDICATION: Fall, pain COMPARISON: 02/26/2025.     IMPRESSION: Left femur internal fixation changes with marked periosteal bone formation/mineralized callus about the femoral shaft fracture site, similar compared to previous. There is no evidence of an acute left femoral fracture. Intrapelvic IUD. Left knee shows mild medial and patellofemoral compartment arthrosis without fracture or joint effusion.    XR Knee Left 1/2 Views    Result Date: 4/20/2025  EXAM: XR FEMUR LEFT 2 VIEWS, XR KNEE LEFT 1/2 VIEWS LOCATION: [facility] DATE: 4/20/2025 INDICATION: Fall, pain COMPARISON: 02/26/2025.     IMPRESSION: Left femur internal fixation changes with marked periosteal bone formation/mineralized callus about the femoral shaft fracture site, similar compared to previous. There is no evidence of an acute left femoral fracture. Intrapelvic IUD. Left knee shows mild medial and patellofemoral compartment arthrosis without fracture or joint effusion.    DX Bone Density    Result Date: 4/10/2025  EXAM: DX AXIAL HIPS/SPINE LOCATION: [facility] DATE: 4/8/2025 INDICATION:  History of osteopenia, Pathological fracture in other disease, other site, initial encounter for fracture. Tobacco use. Previous left hip surgery. DEMOGRAPHICS: Age- 40 years. Gender- Female. Menopausal status- Premenopausal. COMPARISON: No prior studies available on the current  scanner. TECHNIQUE: Dual-energy x-ray absorptiometry (DXA) performed with routine technique. FINDINGS: DXA RESULTS -Lumbar Spine: L1-L4: BMD: 1.766 g/cm2. Z-score: 3.4. -RIGHT Hip Total: BMD: 1.312 g/cm2. Z-score: 1.8. -RIGHT Hip Femoral neck: BMD: 1.162 g/cm2. Z-score: 0.5. - Z-SCORE CRITERIA -Within the expected range for age: Z-score above -2.0. -Below the expected range for age: Z-score of -2.0 or lower. BMD reporting of Z-score is preferred in pre-menopausal females and males younger than age 50.  Therefore, the World Health Organization (WHO) densitometric classification using T-scores is not applicable to this patient (that criteria is useful for perimenopausal women, postmenopausal women, and men aged 50 years and older). FRACTURE RISK -The FRAX risk calculator is not applicable due to the patient being a premenopausal or perimenopausal female.     IMPRESSION: NORMAL. No increased fracture risk identified. The Z-score is within the expected range for age (Z-score above -2.0).     XR Foot Right G/E 3 Views    Result Date: 4/10/2025  EXAM: XR FOOT RIGHT G/E 3 VIEWS LOCATION: [facility]??? DATE: 4/9/2025 INDICATION:  Type II diabetes mellitus with peripheral autonomic neuropathy (H), Closed displaced fracture of navicular bone of right foot, initial encounter COMPARISON: 2/23/2025     IMPRESSION: Slight hallux valgus. Mild degenerative change first MTP joint. There is irregularity along the distal lateral corner of the calcaneus adjacent to the calcaneocuboid articulation. The chronicity of this finding is uncertain but this could be posttraumatic in origin. It has not really changed from the previous examination, however. Recommend correlation with pain in this location. In addition, there is soft tissue swelling over the dorsal aspect of the foot and osteophytic spurring along the dorsal margin of the navicular adjacent to the navicular cuneiform joints.    Fibrosis Scan    Patient: Divina BOLTON  Leona YOB: 1985 Medical Record Number: [id number] ORDERING PROVIDER: Ivelisse Sanchez PA-C EXAMINATION:   Liver FibroScan DATE OF EXAM: 3/25/25 INDICATION:   Liver fibrosis assessment HISTORY:   MASH/MASLD A series of at least 10 Vibration Controlled Transient Elastography (VCTE) measurements were performed by placing the XL probe over the center of the liver parenchyma and mechanically inducing a 50 Hertz shear wave. Each resulting VCTE measurement was analyzed to determine shear wave propagation speed and calculate the equivalent liver stiffness. All measurements were reviewed by the  and physician for technical accuracy. Data variability across the acquired measurements was quantified with IQR/Median Percentage. FINDINGS: The median liver stiffness was 5.4 kPa with IQR/Median percentage of 8 %. The measure CAP ultrasound attenuation rate value was 222 dB/m. IMPRESSION:   1. Estimated liver fibrosis is Stage 0-1 2. Steatosis Grade S0 The results of the FibroScan examination were assessed by taking into account the quality of the measurement thumbnails, number of measurements, and IQR/Median ratio. I have personally reviewed the examination and initial interpretation, and I agree with the findings.   Sonja Bueno PA-C        I spent 54 minutes on the patient's visit today.  This included preparation for the visit, face-to-face time with the patient and documentation following the visit.  It did not include teaching or procedure time.    Signed by: Peter E. Friedell, MD

## 2025-04-21 NOTE — ED PROVIDER NOTES
"  History     Chief Complaint   Patient presents with    Fall     LLE pain after fall, hx of femur fx - has rods in place, feels like something is not right.       HPI  History per patient, review of [facility] EMR and Care Everywhere EMR.   Divina Delgadillo is a 40 year old female with history of intramedullary ulysses and screw fixation of the left femur who lost her balance and sustained a twisting injury of her left knee at approximately 4 PM when she tripped on the person walking in front of her , \"tripped on their pant leg\" in her home a short time prior to arrival.  She has poorly localized diffuse knee pain with no swelling and difficulty ambulating and bearing weight due to severe pain, and arrives by EMS. No distal CMS or neurovascular deficit or dysfunction.  No other injury or trauma.  No anticoagulation therapy.  No other pertinent history or acute complaints or concerns.     Allergies:  Allergies   Allergen Reactions    Oxycodone-Acetaminophen Other (See Comments)     weston Bustillos       Problem List:    Patient Active Problem List    Diagnosis Date Noted    Mirena IUD 4/7/25 04/07/2025     Priority: Medium     Clinic Administered Medication Documentation      Intrauterine/Implant Insertion Documentation    Device was placed by provider (please see MAR for given by information). Please see MAR and medication order for additional information.     Type: Mirena  Remove/Replace by: 04/7/2033    Expiration Date:  05/2027          Secondary renal hyperparathyroidism 10/30/2024     Priority: Medium    Peroneal tendinitis of left lower extremity 06/14/2024     Priority: Medium    Acute left ankle pain 06/14/2024     Priority: Medium    Avascular necrosis of bone of ankle (H) 06/14/2024     Priority: Medium    Other fracture of left femur, initial encounter for closed fracture (H) 01/21/2024     Priority: Medium    Chiari malformation type I (H) 06/12/2023     Priority: Medium    Ulnar neuropathy of both upper " extremities 09/20/2022     Priority: Medium    Insomnia, unspecified type 08/30/2021     Priority: Medium    History of DVT of arm  (deep vein thrombosis) 01/23/2021     Priority: Medium     ultrasound 1/6/2017-  venous thrombus in the antecubital fossa region and the left forearm as described      Schizoaffective disorder, depressive type (H) 07/19/2019     Priority: Medium    Acquired asplenia 03/22/2019     Priority: Medium    Chronic leukocytosis 11/27/2018     Priority: Medium     Due to spleen removal      Nicotine abuse 08/13/2018     Priority: Medium    Mild intermittent asthma with acute exacerbation 01/16/2018     Priority: Medium    Morbid obesity (H) 01/09/2018     Priority: Medium    IUD (intrauterine device) in place 07/26/2017     Priority: Medium     Mirena IUD inserted in office with premed 7/26/2017        Cervical high risk HPV (human papillomavirus) test positive 06/20/2017     Priority: Medium     6/15/17 NIL, +HR HPV (not 16/18). Plan cotest in 1 year  6/14/18 NIL pap, neg HPV. Plan cotest in 3 years  3/26/21 NIL pap, Neg HPV. Plan cotest in 3 years.   4/24/24 NIL pap, +HR HPV (not 16/18). Plan: cotest in 1 year  4/7/25 NIL pap, Neg HPV. 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, splenectomy, left adrenalectomy 02/26/2015;      Type 2 diabetes mellitus with stage 3 chronic kidney disease, with long-term current use of insulin (H) 02/27/2017     Priority: Medium    Bipolar disorder (H) 02/27/2017     Priority: Medium    Orthostatic hypotension 01/10/2017     Priority: Medium    Tobacco abuse 01/10/2017     Priority: Medium    Long term (current) use of anticoagulants 01/09/2017     Priority: Medium    Deep venous thrombosis of arm (H) 01/09/2017     Priority: Medium    Stage 3 chronic kidney disease 12/03/2015     Priority: Medium     Overview:   Updated per 10/1/17  IMO import      Leukocytosis, unspecified type 08/28/2015     Priority: Medium    Vitamin D insufficiency 06/01/2015     Priority: Medium    Cardiac murmur 08/20/2013     Priority: Medium    Depressive disorder 09/15/2012     Priority: Medium    Hyperlipidemia LDL goal <100 10/31/2010     Priority: Medium    Borderline personality disorder (H) 11/06/2009     Priority: Medium    Essential hypertension 06/13/2008     Priority: Medium    NAFL (nonalcoholic fatty liver) 04/11/2006     Priority: Medium     See flowsheet for hepatic panel.   Stopped zocor, was on godon as well had right upper quandrant ultrasound and ct  ? Psych med related vs SAHNI      Esophageal reflux 04/14/2005     Priority: Medium     GERD      PTSD (post-traumatic stress disorder) 01/01/2001     Priority: Medium        Past Medical History:    Past Medical History:   Diagnosis Date    Acquired asplenia     Acute pain of left knee 03/22/2019    Acute-on-chronic kidney injury 03/22/2019    ARF (acute renal failure) 03/23/2019    Asthma     Bipolar disorder (H)     Cardiac murmur     Cervical high risk HPV (human papillomavirus) test positive 06/20/2017    Chiari malformation type I (H)     Chronic bilateral low back pain without sciatica     Deep venous thrombosis of arm (H) 01/06/2017    Depressive disorder     DVT (deep venous thrombosis) (H)     DVT of upper extremity (deep vein thrombosis) (H) 2021    Esophageal reflux     Hypertension     Insomnia     Leukocytosis     Mild intermittent asthma     Morbid obesity (H)     Mucinous neoplasm of pancreas     NAFL (nonalcoholic fatty liver)     Neck pain     Nicotine abuse     Other specified types of schizophrenia, unspecified condition     Schizoaffective disorder, depressive type (H)     Stage 3a chronic kidney disease (CKD) (H)     Type 2 diabetes mellitus (H)     Ulnar neuropathy of both upper extremities     Vitamin D insufficiency        Past Surgical History:    Past Surgical History:    Procedure Laterality Date    ADRENALECTOMY Left 2015    BIOPSY      BREAST SURGERY  2013    COLONOSCOPY      ENT SURGERY  10/01/2000    Tympanoplasty    IR LIVER BIOPSY PERCUTANEOUS  11/14/2019    PANCREATECTOMY PARTIAL  2015    PERCUTANEOUS BIOPSY LIVER Right 11/14/2019    Procedure: Percutaneous Liver Biopsy;  Surgeon: Sean Guzman PA-C;  Location: [facility] OR    SPLENECTOMY  2015    TONSILLECTOMY  10/01/2000    Tonsillectomy       Family History:    Family History   Problem Relation Age of Onset    Respiratory Mother         ASTHMA    Allergies Mother     Depression Mother     Chronic Obstructive Pulmonary Disease Mother     Anxiety Disorder Mother     Mental Illness Mother     Asthma Mother     Heart Disease Father         HEART VALVE REPLACEMENT    Hypertension Father     Diabetes Father     Depression Father     Anxiety Disorder Father     Mental Illness Father     Obesity Father     Respiratory Sister     Allergies Sister     Depression Sister     Respiratory Sister     Allergies Sister     Depression Sister     Respiratory Brother     Allergies Brother     Sleep Apnea Maternal Grandfather     Kidney Disease No family hx of     Liver Disease No family hx of     Liver Cancer No family hx of        Social History:  Marital Status:  Single 1  Social History     Tobacco Use    Smoking status: Every Day     Current packs/day: 0.50     Average packs/day: 0.5 packs/day for 1.4 years (0.7 ttl pk-yrs)     Types: Cigarettes, Vaping Device     Start date: 11/27/2023     Passive exposure: Yes    Smokeless tobacco: Never    Tobacco comments:     E- cig and cigarettes (2-3 cigarettes per day)   Vaping Use    Vaping status: Every Day    Substances: Nicotine, Flavoring    Devices: Disposable   Substance Use Topics    Alcohol use: No     Comment: sober since 2022    Drug use: No        Medications:    HYDROcodone-acetaminophen (NORCO) 5-325 MG tablet  acetaminophen (TYLENOL) 500 MG tablet  albuterol (VENTOLIN HFA)  "108 (90 Base) MCG/ACT inhaler  amitriptyline (ELAVIL) 10 MG tablet  ARIPiprazole (ABILIFY) 15 MG tablet  atorvastatin (LIPITOR) 20 MG tablet  Biotin 5 MG TABS  blood glucose (ACCU-CHEK GUIDE) test strip  bumetanide (BUMEX) 0.5 MG tablet  carvedilol (COREG) 12.5 MG tablet  cloZAPine (CLOZARIL) 50 MG tablet  docusate sodium (COLACE) 100 MG capsule  FLUoxetine (PROZAC) 20 MG capsule  gabapentin (NEURONTIN) 300 MG capsule  glucose (BD GLUCOSE) 4 g chewable tablet  hydrOXYzine HCl (ATARAX) 25 MG tablet  insulin aspart (NOVOLOG VIAL) 100 UNITS/ML vial  Insulin Infusion Pump (MINIMED 780G INSULIN PUMP) KIT  Insulin Infusion Pump Supplies (EXTENDED INFUSION SET 23\"/6MM) MISC  Insulin Infusion Pump Supplies (EXTENDED RESERVOIR 3ML) MISC  lamoTRIgine (LAMICTAL) 100 MG tablet  levonorgestrel (MIRENA) 52 MG (20 mcg/day) IUD  loratadine (CLARITIN) 10 MG tablet  omeprazole (PRILOSEC) 20 MG DR capsule  QUEtiapine (SEROQUEL) 25 MG tablet  Suvorexant (BELSOMRA) 10 MG tablet  topiramate (TOPAMAX) 25 MG tablet      Review of Systems  As mentioned in the HPI, in addition focused review of systems was negative.    Physical Exam   BP: (!) 163/65  Pulse: 89  Temp: 99.4  F (37.4  C)  Resp: 18  Height: 160 cm (5' 3\")  Weight: 108.9 kg (240 lb)  SpO2: (!) 88 %      Physical Exam  Constitutional:       General: She is not in acute distress.     Appearance: Normal appearance. She is not ill-appearing.   Cardiovascular:      Rate and Rhythm: Normal rate and regular rhythm.      Pulses: Normal pulses.   Musculoskeletal:         General: No swelling or deformity.      Left knee: No swelling, deformity, effusion, ecchymosis, bony tenderness or crepitus. Normal range of motion. Tenderness present over the medial joint line, lateral joint line, MCL, LCL, ACL and PCL. No patellar tendon tenderness. No LCL laxity, MCL laxity, ACL laxity or PCL laxity.Normal alignment and normal patellar mobility. Normal pulse.      Instability Tests: Anterior drawer " test negative. Posterior drawer test negative. Anterior Lachman test negative.      Right lower leg: No edema.      Left lower leg: Normal. No edema.   Skin:     General: Skin is warm and dry.      Capillary Refill: Capillary refill takes less than 2 seconds.      Coloration: Skin is not jaundiced or pale.      Findings: No bruising, erythema or rash.   Neurological:      General: No focal deficit present.      Mental Status: She is alert.      Sensory: No sensory deficit.      Motor: No weakness.   Psychiatric:         Mood and Affect: Mood normal.         Behavior: Behavior normal.         ED Course        Procedures            Results for orders placed or performed during the hospital encounter of 04/20/25   XR Femur Left 2 Views     Status: None    Narrative    EXAM: XR FEMUR LEFT 2 VIEWS, XR KNEE LEFT 1/2 VIEWS  LOCATION: [facility]  DATE: 4/20/2025    INDICATION: Fall, pain  COMPARISON: 02/26/2025.      Impression    IMPRESSION: Left femur internal fixation changes with marked periosteal bone formation/mineralized callus about the femoral shaft fracture site, similar compared to previous. There is no evidence of an acute left femoral fracture.    Intrapelvic IUD.    Left knee shows mild medial and patellofemoral compartment arthrosis without fracture or joint effusion.   XR Knee Left 1/2 Views     Status: None    Narrative    EXAM: XR FEMUR LEFT 2 VIEWS, XR KNEE LEFT 1/2 VIEWS  LOCATION: [facility]  DATE: 4/20/2025    INDICATION: Fall, pain  COMPARISON: 02/26/2025.      Impression    IMPRESSION: Left femur internal fixation changes with marked periosteal bone formation/mineralized callus about the femoral shaft fracture site, similar compared to previous. There is no evidence of an acute left femoral fracture.    Intrapelvic IUD.    Left knee shows mild medial and patellofemoral compartment arthrosis without fracture or joint effusion.      I  "independently reviewed the X-rays: Agree with the Radiologist's interpretation, no acute abnormality or traumatic abnormality identified on plain films.        Medications   HYDROcodone-acetaminophen (NORCO) 5-325 MG per tablet 2 tablet (2 tablets Oral $Given 4/20/25 1930)       Assessments & Plan (with Medical Decision Making)   40 year old female with history of intramedullary ulysses and screw fixation of the left femur who lost her balance and sustained a twisting injury of her left knee when she tripped on the person walking in front of her, \"tripped on their pant leg\" in her home a short time prior to arrival.  She has poorly localized diffuse knee pain with difficulty ambulating and bearing weight due to severe pain.  No knee joint effusion or ligamentous laxity.  Plain films unremarkable.  History signs and symptoms and x-rays consistent with a sprain of the left knee.  She was placed in a knee immobilizer and instructed on supportive care.  I prescribed a small number of Norco to use if needed for pain refractory to OTC analgesics.  I made an orthopedic  referral for her follow-up.  She was discharged with instructions for supportive care.    I have reviewed the nursing notes.    I have reviewed the findings, diagnosis, plan and need for follow up with the patient.    Discharge Medication List as of 4/20/2025 10:04 PM        START taking these medications    Details   HYDROcodone-acetaminophen (NORCO) 5-325 MG tablet Take 1-2 tablets by mouth every 6 hours as needed for severe pain., Disp-10 tablet, R-0, E-Prescribe             Final diagnoses:   Sprain of left knee, unspecified ligament, initial encounter       4/20/2025   [facility] EMERGENCY DEPT       Jama De Luna MD  04/22/25 0380    "

## 2025-04-21 NOTE — LETTER
"4/21/2025      Divina Delgadillo  [id number] [address] [address]      Dear Colleague,    Thank you for referring your patient, Divina Delgadillo, to the [facility] [place]. Please see a copy of my visit note below.    Oncology Rooming Note    April 21, 2025 3:08 PM   Divina Delgadillo is a 40 year old female who presents for:    Chief Complaint   Patient presents with     Hematology     Leukocytosis     Initial Vitals: BP (!) 172/52 (BP Location: Right arm, Patient Position: Sitting, Cuff Size: Adult Regular)   Pulse 72   Temp 99.3  F (37.4  C) (Temporal)   Resp 18   Wt 107.5 kg (236 lb 14.4 oz)   SpO2 95%   BMI 41.96 kg/m   Estimated body mass index is 41.96 kg/m  as calculated from the following:    Height as of 4/20/25: 1.6 m (5' 3\").    Weight as of this encounter: 107.5 kg (236 lb 14.4 oz). Body surface area is 2.19 meters squared.  Severe Pain (7) Comment: Data Unavailable   No LMP recorded. (Menstrual status: IUD).  Allergies reviewed: Yes  Medications reviewed: Yes    Medications: Medication refills not needed today.  Pharmacy name entered into EPIC:    MAIL ORDER - NO PHONE  PHARMACARE - [facility] - [place], [place] - [address] [facility] - [place], [place] - [address] [facility] - [place], [place] - [address] [facility]    Frailty Screening:   Is the patient here for a new oncology consult visit in cancer care? 2. No    PHQ9:  Did this patient require a PHQ9?: No      Clinical concerns: New visit for leukocytosis.      Jenny Bradford RN                [facility] Hematology and Oncology Consult Note    Patient: Divina Delgadillo  MRN: [id number]  Date of Service: Apr 21, 2025       Reason for Visit    I was consulted by Sánchez Dias DO for evaluation of chronic leukocytosis.      Encounter Diagnoses Assessment and Plan:    Problem List Items Addressed This Visit  "      Tobacco abuse - Primary    Leukocytosis, unspecified type     Patient with elevated white blood cell count since at least 2015.  Results have varied between 13 and 17,000.  These findings are most consistent with tobacco abuse.  Which leads to persistent inflammation.  This is supported by the elevated sedimentation rate and CRP noted in July 2024.  She also had investigation of this problem in 2018 and there was no evidence of myeloproliferative disorder.  She has other comorbidities that will also increase the white blood cell count particularly morbid obesity.  No further hematologic investigation is required.  No follow-up with hematology as needed.      ______________________________________________________________________________        History of Present Illness    Ms. Divina Delgadillo is a 40 year old woman with multiple comorbidities including partial pancreatectomy, diabetes mellitus, obesity chronic renal disease, chronic kidney disease and tobacco abuse.  She has a elevated leukocytes since 2015.  They have ranged between 13 and 17,000 for the most part.  Differential shows mature white blood cells.  She has not noted unexplained chills, fevers or sweats.  She does note some difficulty breathing after swallowing.  Presently she is smoking 3 to 4 cigarettes/day.  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  She is taking gabapentin for peripheral neuropathy and does notice some improvement.    Review of systems.  Pertinent Findings are included in the History of Present Illness    Physical Exam    BP (!) 172/52 (BP Location: Right arm, Patient Position: Sitting, Cuff Size: Adult Regular)   Pulse 72   Temp 99.3  F (37.4  C) (Temporal)   Resp 18   Wt 107.5 kg (236 lb 14.4 oz)   SpO2 95%   BMI 41.96 kg/m       GENERAL APPEARANCE: 40-year-old woman in no apparent distress.  HEAD: Atraumatic; normocephalic; without lesions.  EYES: Conjunctiva, corneas and eyelids normal; pupils equal,  round, reactive to light; No Icterus.  MOUTH/OROPHARYNX: Oral mucosa intact upper and lower dentures  NECK: Supple with no nodes.  LUNGS:  Clear to auscultation and percussion with no extra sounds.  HEART: Regular rhythm and rate; S1 and S2 normal; no murmurs noted.  ABDOMEN: Soft; no masses or tenderness, no hepatosplenomegaly.  Body habitus limits exam  NEUROLOGIC: Alert and oriented.  No obvious focal findings.  EXTREMITIES: No cyanosis, or edema.  SKIN: No abnormal bruising or bleeding. No suspicious lesions noted on exposed skin.  PSYCHIATRIC: Mental status normal; no apparent psychiatric issues    Medications:    Current Outpatient Medications   Medication Sig Dispense Refill     amitriptyline (ELAVIL) 10 MG tablet Take 1 tablet (10 mg) by mouth at bedtime. 90 tablet 1     ARIPiprazole (ABILIFY) 15 MG tablet Take 1 Tablet (15 mg) by mouth once daily in the evening.       atorvastatin (LIPITOR) 20 MG tablet Take 1 tablet (20 mg) by mouth daily 30 tablet 0     Biotin 5 MG TABS Take 1 tablet by mouth daily 100 tablet 1     blood glucose (ACCU-CHEK GUIDE) test strip Use to test blood sugar 3 times daily or as directed. 100 strip 11     carvedilol (COREG) 12.5 MG tablet Take 1 tablet (12.5 mg) by mouth 2 times daily 60 tablet 11     docusate sodium (COLACE) 100 MG capsule Take 1 capsule (100 mg) by mouth 2 times daily as needed for constipation. 60 capsule 5     FLUoxetine (PROZAC) 20 MG capsule Take 3 Capsules (60 mg) by mouth once daily in the morning.       gabapentin (NEURONTIN) 300 MG capsule Take 1 capsule (300 mg) by mouth 3 times daily. 90 capsule 0     omeprazole (PRILOSEC) 20 MG DR capsule Take 1 capsule (20 mg) by mouth 2 times daily. 30 minutes before meal on empty stomach. 90 capsule 2     acetaminophen (TYLENOL) 500 MG tablet Take 1-2 tablets (500-1,000 mg) by mouth every 6 hours as needed for mild pain. (Patient not taking: Reported on 4/7/2025) 40 tablet 0     albuterol (VENTOLIN HFA) 108 (90 Base)  "MCG/ACT inhaler INHALE 2 PUFFS INTO THE LUNGS EVERY SIX  HOURS AS NEEDED FOR SHORTNESS OF BREATH (Patient not taking: Reported on 4/7/2025) 18 g 5     bumetanide (BUMEX) 0.5 MG tablet Take 1 tablet (0.5 mg) by mouth daily (Patient not taking: Reported on 4/21/2025) 30 tablet 2     cloZAPine (CLOZARIL) 50 MG tablet Take 100 mg by mouth at bedtime. (Patient not taking: Reported on 4/21/2025)       glucose (BD GLUCOSE) 4 g chewable tablet Take 1 tablet by mouth every hour as needed for low blood sugar 30 tablet 4     HYDROcodone-acetaminophen (NORCO) 5-325 MG tablet Take 1-2 tablets by mouth every 6 hours as needed for severe pain. 10 tablet 0     hydrOXYzine HCl (ATARAX) 25 MG tablet Take 25 mg by mouth 3 times daily as needed for anxiety (Patient not taking: Reported on 4/21/2025)       insulin aspart (NOVOLOG VIAL) 100 UNITS/ML vial for use with continuous insulin pump  Max TDD 80 (Patient not taking: Reported on 4/21/2025) 150 mL 1     Insulin Infusion Pump (MINIMED 780G INSULIN PUMP) KIT 1 each daily SmartGuard Target: 100; Active Insulin Time: 2 hours (recommended); SmartGuardTM Warmup/Manual Mode: Suspend before low (recommended) 1 kit 0     Insulin Infusion Pump Supplies (EXTENDED INFUSION SET 23\"/6MM) MISC 1 each every 7 days Max Total Daily Dose 70 units; Change infusion set & reservoir every 7 days (recommended) 10 each 6     Insulin Infusion Pump Supplies (EXTENDED RESERVOIR 3ML) MISC 1 each every 7 days 10 each 11     lamoTRIgine (LAMICTAL) 100 MG tablet Take 100 mg by mouth At Bedtime (Patient not taking: Reported on 4/21/2025)       levonorgestrel (MIRENA) 52 MG (20 mcg/day) IUD 1 each by Intrauterine route once.       loratadine (CLARITIN) 10 MG tablet Take 1 tablet (10 mg) by mouth every morning 30 tablet 11     QUEtiapine (SEROQUEL) 25 MG tablet TAKE ONE TABLET BY MOUTH FOUR TIMES DAILY AS NEEDED FOR AGITATION OR HALLUCINATIONS (Patient not taking: Reported on 4/1/2025)       Suvorexant (BELSOMRA) 10 " MG tablet Take 10 mg by mouth at bedtime (Patient not taking: Reported on 4/21/2025)       topiramate (TOPAMAX) 25 MG tablet Take 1 tablet (25 mg) by mouth 2 times daily. (Patient not taking: Reported on 4/21/2025) 60 tablet 2     Current Facility-Administered Medications   Medication Dose Route Frequency Provider Last Rate Last Admin     levonorgestrel (MIRENA) 52 MG (20 mcg/day) IUD 1 each  1 each Intrauterine See Admin Instructions Irina Camejo MD   1 each at 04/07/25 1413           Past History    Past Medical History:   Diagnosis Date     Acquired asplenia      Acute pain of left knee 03/22/2019     Acute-on-chronic kidney injury 03/22/2019     ARF (acute renal failure) 03/23/2019     Asthma      Bipolar disorder (H)      Cardiac murmur      Cervical high risk HPV (human papillomavirus) test positive 06/20/2017     Chiari malformation type I (H)      Chronic bilateral low back pain without sciatica      Deep venous thrombosis of arm (H) 01/06/2017    ultrasound 1/6/2017-  venous thrombus in the antecubital fossa region and the left forearm as described     Depressive disorder      DVT (deep venous thrombosis) (H)      DVT of upper extremity (deep vein thrombosis) (H) 2021     Esophageal reflux     GERD     Hypertension      Insomnia      Leukocytosis      Mild intermittent asthma      Morbid obesity (H)      Mucinous neoplasm of pancreas      NAFL (nonalcoholic fatty liver)      Neck pain      Nicotine abuse      Other specified types of schizophrenia, unspecified condition      Schizoaffective disorder, depressive type (H)      Stage 3a chronic kidney disease (CKD) (H)      Type 2 diabetes mellitus (H)      Ulnar neuropathy of both upper extremities      Vitamin D insufficiency      Past Surgical History:   Procedure Laterality Date     ADRENALECTOMY Left 2015     BIOPSY       BREAST SURGERY  2013     COLONOSCOPY       ENT SURGERY  10/01/2000    Tympanoplasty     IR LIVER BIOPSY PERCUTANEOUS   11/14/2019     PANCREATECTOMY PARTIAL  2015     PERCUTANEOUS BIOPSY LIVER Right 11/14/2019    Procedure: Percutaneous Liver Biopsy;  Surgeon: Sean Guzman PA-C;  Location: [facility] OR     SPLENECTOMY  2015     TONSILLECTOMY  10/01/2000    Tonsillectomy     Allergies   Allergen Reactions     Oxycodone-Acetaminophen Other (See Comments)     weston Bustillos     Family History   Problem Relation Age of Onset     Respiratory Mother         ASTHMA     Allergies Mother      Depression Mother      Chronic Obstructive Pulmonary Disease Mother      Anxiety Disorder Mother      Mental Illness Mother      Asthma Mother      Heart Disease Father         HEART VALVE REPLACEMENT     Hypertension Father      Diabetes Father      Depression Father      Anxiety Disorder Father      Mental Illness Father      Obesity Father      Respiratory Sister      Allergies Sister      Depression Sister      Respiratory Sister      Allergies Sister      Depression Sister      Respiratory Brother      Allergies Brother      Sleep Apnea Maternal Grandfather      Kidney Disease No family hx of      Liver Disease No family hx of      Liver Cancer No family hx of      Social History     Socioeconomic History     Marital status: Single     Spouse name: None     Number of children: 0     Years of education: None     Highest education level: None   Tobacco Use     Smoking status: Every Day     Current packs/day: 0.50     Average packs/day: 0.5 packs/day for 1.4 years (0.7 ttl pk-yrs)     Types: Cigarettes, Vaping Device     Start date: 11/27/2023     Passive exposure: Yes     Smokeless tobacco: Never     Tobacco comments:     E- cig and cigarettes (2-3 cigarettes per day)   Vaping Use     Vaping status: Every Day     Substances: Nicotine, Flavoring     Devices: Disposable   Substance and Sexual Activity     Alcohol use: No     Comment: sober since 2022     Drug use: No     Sexual activity: Yes     Partners: Female, Male     Birth control/protection:  I.U.D.     Comment: Both male and female    Other Topics Concern     Parent/sibling w/ CABG, MI or angioplasty before 65F 55M? No     Social Drivers of Health     Financial Resource Strain: Low Risk  (1/2/2025)    Financial Resource Strain      Within the past 12 months, have you or your family members you live with been unable to get utilities (heat, electricity) when it was really needed?: No   Food Insecurity: Low Risk  (1/2/2025)    Food Insecurity      Within the past 12 months, did you worry that your food would run out before you got money to buy more?: No      Within the past 12 months, did the food you bought just not last and you didn t have money to get more?: No   Transportation Needs: Low Risk  (1/2/2025)    Transportation Needs      Within the past 12 months, has lack of transportation kept you from medical appointments, getting your medicines, non-medical meetings or appointments, work, or from getting things that you need?: No   Physical Activity: Inactive (1/2/2025)    Exercise Vital Sign      Days of Exercise per Week: 1 day      Minutes of Exercise per Session: 0 min   Stress: Stress Concern Present (1/2/2025)    [place] [employer] - Occupational Stress Questionnaire      Feeling of Stress : To some extent   Social Connections: Unknown (1/2/2025)    Social Connection and Isolation Panel [NHANES]      Frequency of Social Gatherings with Friends and Family: More than three times a week   Interpersonal Safety: Low Risk  (2/14/2025)    Interpersonal Safety      Do you feel physically and emotionally safe where you currently live?: Yes      Within the past 12 months, have you been hit, slapped, kicked or otherwise physically hurt by someone?: No      Within the past 12 months, have you been humiliated or emotionally abused in other ways by your partner or ex-partner?: No   Housing Stability: Low Risk  (1/2/2025)    Housing Stability      Do you have housing? : Yes      Are you  worried about losing your housing?: No           Lab Results    No results found for this or any previous visit (from the past 240 hours).    Imaging Results    XR Femur Left 2 Views    Result Date: 4/20/2025  EXAM: XR FEMUR LEFT 2 VIEWS, XR KNEE LEFT 1/2 VIEWS LOCATION: [facility] DATE: 4/20/2025 INDICATION: Fall, pain COMPARISON: 02/26/2025.     IMPRESSION: Left femur internal fixation changes with marked periosteal bone formation/mineralized callus about the femoral shaft fracture site, similar compared to previous. There is no evidence of an acute left femoral fracture. Intrapelvic IUD. Left knee shows mild medial and patellofemoral compartment arthrosis without fracture or joint effusion.    XR Knee Left 1/2 Views    Result Date: 4/20/2025  EXAM: XR FEMUR LEFT 2 VIEWS, XR KNEE LEFT 1/2 VIEWS LOCATION: [facility] DATE: 4/20/2025 INDICATION: Fall, pain COMPARISON: 02/26/2025.     IMPRESSION: Left femur internal fixation changes with marked periosteal bone formation/mineralized callus about the femoral shaft fracture site, similar compared to previous. There is no evidence of an acute left femoral fracture. Intrapelvic IUD. Left knee shows mild medial and patellofemoral compartment arthrosis without fracture or joint effusion.    DX Bone Density    Result Date: 4/10/2025  EXAM: DX AXIAL HIPS/SPINE LOCATION: [facility] DATE: 4/8/2025 INDICATION:  History of osteopenia, Pathological fracture in other disease, other site, initial encounter for fracture. Tobacco use. Previous left hip surgery. DEMOGRAPHICS: Age- 40 years. Gender- Female. Menopausal status- Premenopausal. COMPARISON: No prior studies available on the current scanner. TECHNIQUE: Dual-energy x-ray absorptiometry (DXA) performed with routine technique. FINDINGS: DXA RESULTS -Lumbar Spine: L1-L4: BMD: 1.766 g/cm2. Z-score: 3.4. -RIGHT Hip Total: BMD: 1.312 g/cm2. Z-score:  1.8. -RIGHT Hip Femoral neck: BMD: 1.162 g/cm2. Z-score: 0.5. - Z-SCORE CRITERIA -Within the expected range for age: Z-score above -2.0. -Below the expected range for age: Z-score of -2.0 or lower. BMD reporting of Z-score is preferred in pre-menopausal females and males younger than age 50.  Therefore, the World Health Organization (WHO) densitometric classification using T-scores is not applicable to this patient (that criteria is useful for perimenopausal women, postmenopausal women, and men aged 50 years and older). FRACTURE RISK -The FRAX risk calculator is not applicable due to the patient being a premenopausal or perimenopausal female.     IMPRESSION: NORMAL. No increased fracture risk identified. The Z-score is within the expected range for age (Z-score above -2.0).     XR Foot Right G/E 3 Views    Result Date: 4/10/2025  EXAM: XR FOOT RIGHT G/E 3 VIEWS LOCATION: [facility] DATE: 4/9/2025 INDICATION:  Type II diabetes mellitus with peripheral autonomic neuropathy (H), Closed displaced fracture of navicular bone of right foot, initial encounter COMPARISON: 2/23/2025     IMPRESSION: Slight hallux valgus. Mild degenerative change first MTP joint. There is irregularity along the distal lateral corner of the calcaneus adjacent to the calcaneocuboid articulation. The chronicity of this finding is uncertain but this could be posttraumatic in origin. It has not really changed from the previous examination, however. Recommend correlation with pain in this location. In addition, there is soft tissue swelling over the dorsal aspect of the foot and osteophytic spurring along the dorsal margin of the navicular adjacent to the navicular cuneiform joints.    Fibrosis Scan    Patient: Divina Delgadillo YOB: 1985 Medical Record Number: [id number] ORDERING PROVIDER: Ivelisse Sanchez PA-C EXAMINATION:   Liver FibroScan DATE OF EXAM: 3/25/25 INDICATION:   Liver fibrosis assessment HISTORY:    MASH/MASLD A series of at least 10 Vibration Controlled Transient Elastography (VCTE) measurements were performed by placing the XL probe over the center of the liver parenchyma and mechanically inducing a 50 Hertz shear wave. Each resulting VCTE measurement was analyzed to determine shear wave propagation speed and calculate the equivalent liver stiffness. All measurements were reviewed by the  and physician for technical accuracy. Data variability across the acquired measurements was quantified with IQR/Median Percentage. FINDINGS: The median liver stiffness was 5.4 kPa with IQR/Median percentage of 8 %. The measure CAP ultrasound attenuation rate value was 222 dB/m. IMPRESSION:   1. Estimated liver fibrosis is Stage 0-1 2. Steatosis Grade S0 The results of the FibroScan examination were assessed by taking into account the quality of the measurement thumbnails, number of measurements, and IQR/Median ratio. I have personally reviewed the examination and initial interpretation, and I agree with the findings.   Sonja Bueno PA-C        I spent 54 minutes on the patient's visit today.  This included preparation for the visit, face-to-face time with the patient and documentation following the visit.  It did not include teaching or procedure time.    Signed by: Peter E. Friedell, MD          Again, thank you for allowing me to participate in the care of your patient.        Sincerely,        Peter E. Friedell, MD    Electronically signed

## 2025-04-21 NOTE — PROGRESS NOTES
"chief complaint:   Chief Complaint   Patient presents with    Left Knee - Pain     Divina is here today for follow up left knee pain.  She was seen on 4.20.25 for this injury and was dx with a sprain.  Has swelling in the knee and down the leg.  Was recently started on some gabapentin and is having difficulty with memory at this time.         HISTORY OF PRESENT ILLNESS    Divina Delgadillo is a 40 year old female seen for evaluation of a left knee injury that occurred 3 days ago. The injury occurred 4/20/2025, fell backwards onto her left buttock, thought maybe the ulysses in her thigh drove into her knee. Now has pain in the left knee, behind the knee. Since injury, it is improving. Was seen in the ED with xrays of the femur, knee ok.    Recently started on gabapentin for neuropathy.    She's had problems with her knee in the past, pain, \"clicking\". No treatments.       She is status post IMN left femur fracture 1/2024 while in [place]. We had followed her postoperative, and has done well overall. Recall injury when she was in [place], on a [place] sidewalk at the airport and her boot caught and went flying into the air and landed on her left side.  She was taken to a hospital in [place], noted to have a left femur fracture, and had an intramedullary nail placed.        Present symptoms: moderate pain behind the knee, no swelling.  Symptoms occur with walking too much or sleeping on it wrong.  The frequency of symptoms frequently.  Pain severity: 6/10  Pain quality: sharp and shooting      Other PMH:  has a past medical history of Acquired asplenia, Acute pain of left knee (03/22/2019), Acute-on-chronic kidney injury (03/22/2019), ARF (acute renal failure) (03/23/2019), Asthma, Bipolar disorder (H), Cardiac murmur, Cervical high risk HPV (human papillomavirus) test positive (06/20/2017), Chiari malformation type I (H), Chronic bilateral low back pain without sciatica, Deep venous thrombosis of arm (H) (01/06/2017), " Depressive disorder, DVT (deep venous thrombosis) (H), DVT of upper extremity (deep vein thrombosis) (H) (2021), Esophageal reflux, Hypertension, Insomnia, Leukocytosis, Mild intermittent asthma, Morbid obesity (H), Mucinous neoplasm of pancreas, NAFL (nonalcoholic fatty liver), Neck pain, Nicotine abuse, Other specified types of schizophrenia, unspecified condition, Schizoaffective disorder, depressive type (H), Stage 3a chronic kidney disease (CKD) (H), Type 2 diabetes mellitus (H), Ulnar neuropathy of both upper extremities, and Vitamin D insufficiency.    She has no past medical history of Arthritis, Cerebral infarction (H), Congestive heart failure (H), COPD (chronic obstructive pulmonary disease) (H), Heart disease, History of blood transfusion, or Thyroid disease.  Patient Active Problem List    Diagnosis Date Noted    Mirena IUD 4/7/25 04/07/2025     Priority: Medium     Clinic Administered Medication Documentation      Intrauterine/Implant Insertion Documentation    Device was placed by provider (please see MAR for given by information). Please see MAR and medication order for additional information.     Type: Mirena  Remove/Replace by: 04/7/2033    Expiration Date:  05/2027          Secondary renal hyperparathyroidism 10/30/2024     Priority: Medium    Peroneal tendinitis of left lower extremity 06/14/2024     Priority: Medium    Acute left ankle pain 06/14/2024     Priority: Medium    Avascular necrosis of bone of ankle (H) 06/14/2024     Priority: Medium    Other fracture of left femur, initial encounter for closed fracture (H) 01/21/2024     Priority: Medium    Chiari malformation type I (H) 06/12/2023     Priority: Medium    Ulnar neuropathy of both upper extremities 09/20/2022     Priority: Medium    Insomnia, unspecified type 08/30/2021     Priority: Medium    History of DVT of arm  (deep vein thrombosis) 01/23/2021     Priority: Medium     ultrasound 1/6/2017-  venous thrombus in the antecubital  fossa region and the left forearm as described      Schizoaffective disorder, depressive type (H) 07/19/2019     Priority: Medium    Acquired asplenia 03/22/2019     Priority: Medium    Chronic leukocytosis 11/27/2018     Priority: Medium     Due to spleen removal      Nicotine abuse 08/13/2018     Priority: Medium    Mild intermittent asthma with acute exacerbation 01/16/2018     Priority: Medium    Morbid obesity (H) 01/09/2018     Priority: Medium    IUD (intrauterine device) in place 07/26/2017     Priority: Medium     Mirena IUD inserted in office with premed 7/26/2017        Cervical high risk HPV (human papillomavirus) test positive 06/20/2017     Priority: Medium     6/15/17 NIL, +HR HPV (not 16/18). Plan cotest in 1 year  6/14/18 NIL pap, neg HPV. Plan cotest in 3 years  3/26/21 NIL pap, Neg HPV. Plan cotest in 3 years.   4/24/24 NIL pap, +HR HPV (not 16/18). Plan: cotest in 1 year  4/7/25 NIL pap, Neg HPV. 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, splenectomy, left adrenalectomy 02/26/2015;      Type 2 diabetes mellitus with stage 3 chronic kidney disease, with long-term current use of insulin (H) 02/27/2017     Priority: Medium    Bipolar disorder (H) 02/27/2017     Priority: Medium    Orthostatic hypotension 01/10/2017     Priority: Medium    Tobacco abuse 01/10/2017     Priority: Medium    Long term (current) use of anticoagulants 01/09/2017     Priority: Medium    Deep venous thrombosis of arm (H) 01/09/2017     Priority: Medium    Stage 3 chronic kidney disease 12/03/2015     Priority: Medium     Overview:   Updated per 10/1/17 IMO import      Neutrophilic leukocytosis 08/28/2015     Priority: Medium    Vitamin D insufficiency 06/01/2015     Priority: Medium    Cardiac murmur 08/20/2013     Priority: Medium    Depressive disorder 09/15/2012     Priority: Medium     Hyperlipidemia LDL goal <100 10/31/2010     Priority: Medium    Borderline personality disorder (H) 11/06/2009     Priority: Medium    Essential hypertension 06/13/2008     Priority: Medium    NAFL (nonalcoholic fatty liver) 04/11/2006     Priority: Medium     See flowsheet for hepatic panel.   Stopped zocor, was on godon as well had right upper quandrant ultrasound and ct  ? Psych med related vs [facility]      Esophageal reflux 04/14/2005     Priority: Medium     GERD      PTSD (post-traumatic stress disorder) 01/01/2001     Priority: Medium       Surgical Hx:  has a past surgical history that includes ENT surgery (10/01/2000); tonsillectomy (10/01/2000); splenectomy (2015); Adrenalectomy (Left, 2015); Pancreatectomy partial (2015); Percutaneous biopsy liver (Right, 11/14/2019); IR Liver Biopsy Percutaneous (11/14/2019); biopsy; Breast surgery (2013); and colonoscopy.    Medications:   Current Outpatient Medications:     acetaminophen (TYLENOL) 500 MG tablet, Take 1-2 tablets (500-1,000 mg) by mouth every 6 hours as needed for mild pain. (Patient not taking: Reported on 4/7/2025), Disp: 40 tablet, Rfl: 0    albuterol (VENTOLIN HFA) 108 (90 Base) MCG/ACT inhaler, INHALE 2 PUFFS INTO THE LUNGS EVERY SIX  HOURS AS NEEDED FOR SHORTNESS OF BREATH (Patient not taking: Reported on 4/7/2025), Disp: 18 g, Rfl: 5    amitriptyline (ELAVIL) 10 MG tablet, Take 1 tablet (10 mg) by mouth at bedtime., Disp: 90 tablet, Rfl: 1    ARIPiprazole (ABILIFY) 15 MG tablet, Take 1 Tablet (15 mg) by mouth once daily in the evening., Disp: , Rfl:     atorvastatin (LIPITOR) 20 MG tablet, Take 1 tablet (20 mg) by mouth daily, Disp: 30 tablet, Rfl: 0    Biotin 5 MG TABS, Take 1 tablet by mouth daily, Disp: 100 tablet, Rfl: 1    blood glucose (ACCU-CHEK GUIDE) test strip, Use to test blood sugar 3 times daily or as directed., Disp: 100 strip, Rfl: 11    bumetanide (BUMEX) 0.5 MG tablet, Take 1 tablet (0.5 mg) by mouth daily (Patient not taking:  "Reported on 4/7/2025), Disp: 30 tablet, Rfl: 2    carvedilol (COREG) 12.5 MG tablet, Take 1 tablet (12.5 mg) by mouth 2 times daily, Disp: 60 tablet, Rfl: 11    cloZAPine (CLOZARIL) 50 MG tablet, Take 100 mg by mouth at bedtime. (Patient not taking: Reported on 4/7/2025), Disp: , Rfl:     docusate sodium (COLACE) 100 MG capsule, Take 1 capsule (100 mg) by mouth 2 times daily as needed for constipation., Disp: 60 capsule, Rfl: 5    FLUoxetine (PROZAC) 20 MG capsule, Take 3 Capsules (60 mg) by mouth once daily in the morning., Disp: , Rfl:     gabapentin (NEURONTIN) 300 MG capsule, Take 1 capsule (300 mg) by mouth 3 times daily., Disp: 90 capsule, Rfl: 0    glucose (BD GLUCOSE) 4 g chewable tablet, Take 1 tablet by mouth every hour as needed for low blood sugar, Disp: 30 tablet, Rfl: 4    HYDROcodone-acetaminophen (NORCO) 5-325 MG tablet, Take 1-2 tablets by mouth every 6 hours as needed for severe pain., Disp: 10 tablet, Rfl: 0    hydrOXYzine HCl (ATARAX) 25 MG tablet, Take 25 mg by mouth 3 times daily as needed for anxiety (Patient not taking: Reported on 4/7/2025), Disp: , Rfl:     insulin aspart (NOVOLOG VIAL) 100 UNITS/ML vial, for use with continuous insulin pump  Max TDD 80 (Patient not taking: Reported on 4/7/2025), Disp: 150 mL, Rfl: 1    Insulin Infusion Pump (MINIMED 780G INSULIN PUMP) KIT, 1 each daily SmartGuard Target: 100; Active Insulin Time: 2 hours (recommended); SmartGuardTM Warmup/Manual Mode: Suspend before low (recommended), Disp: 1 kit, Rfl: 0    Insulin Infusion Pump Supplies (EXTENDED INFUSION SET 23\"/6MM) MISC, 1 each every 7 days Max Total Daily Dose 70 units; Change infusion set & reservoir every 7 days (recommended), Disp: 10 each, Rfl: 6    Insulin Infusion Pump Supplies (EXTENDED RESERVOIR 3ML) MISC, 1 each every 7 days, Disp: 10 each, Rfl: 11    lamoTRIgine (LAMICTAL) 100 MG tablet, Take 100 mg by mouth At Bedtime (Patient not taking: Reported on 4/7/2025), Disp: , Rfl:     " levonorgestrel (MIRENA) 52 MG (20 mcg/day) IUD, 1 each by Intrauterine route once., Disp: , Rfl:     loratadine (CLARITIN) 10 MG tablet, Take 1 tablet (10 mg) by mouth every morning, Disp: 30 tablet, Rfl: 11    omeprazole (PRILOSEC) 20 MG DR capsule, Take 1 capsule (20 mg) by mouth 2 times daily. 30 minutes before meal on empty stomach., Disp: 90 capsule, Rfl: 2    QUEtiapine (SEROQUEL) 25 MG tablet, TAKE ONE TABLET BY MOUTH FOUR TIMES DAILY AS NEEDED FOR AGITATION OR HALLUCINATIONS (Patient not taking: Reported on 4/7/2025), Disp: , Rfl:     Suvorexant (BELSOMRA) 10 MG tablet, Take 10 mg by mouth at bedtime (Patient not taking: Reported on 4/7/2025), Disp: , Rfl:     topiramate (TOPAMAX) 25 MG tablet, Take 1 tablet (25 mg) by mouth 2 times daily. (Patient not taking: Reported on 4/7/2025), Disp: 60 tablet, Rfl: 2    Current Facility-Administered Medications:     levonorgestrel (MIRENA) 52 MG (20 mcg/day) IUD 1 each, 1 each, Intrauterine, See Admin Instructions, Irina Camejo MD, 1 each at 04/07/25 1413    Allergies:   Allergies   Allergen Reactions    Oxycodone-Acetaminophen Other (See Comments)     weston Bustillos       Social Hx:  reports that she has been smoking cigarettes and vaping device. She started smoking about 16 months ago. She has a 0.1 pack-year smoking history. She has been exposed to tobacco smoke. She has never used smokeless tobacco. She reports that she does not drink alcohol and does not use drugs.    Family Hx: family history includes Allergies in her brother, mother, sister, and sister; Anxiety Disorder in her father and mother; Asthma in her mother; Chronic Obstructive Pulmonary Disease in her mother; Depression in her father, mother, sister, and sister; Diabetes in her father; Heart Disease in her father; Hypertension in her father; Mental Illness in her father and mother; Obesity in her father; Respiratory in her brother, mother, sister, and sister; Sleep Apnea in her maternal  "grandfather.    REVIEW OF SYSTEMS: 10 point ROS neg other than the symptoms noted above in the HPI and PMH. Notables include  CONSTITUTIONAL:NEGATIVE for fever, chills, change in weight  INTEGUMENTARY/SKIN: NEGATIVE for worrisome rashes, moles or lesions  MUSCULOSKELETAL:See HPI above  NEURO: NEGATIVE for weakness, dizziness or paresthesias    PHYSICAL EXAM:  Ht 1.6 m (5' 3\")   Wt 104.8 kg (231 lb)   BMI 40.92 kg/m     GENERAL APPEARANCE: healthy, alert, no distress  SKIN: no suspicious lesions or rashes  NEURO: Normal strength and tone, mentation intact and speech normal  PSYCH:  mentation appears normal and affect normal, not anxious  RESPIRATORY: No increased work of breathing.  LYMPH: no palpable popliteal lymphadenopathy.    Hands: no clubbing, nail pitting or nodes.    BILATERAL LOWER EXTREMITIES:  Gait: slight favors the left.   Intact sensation deep peroneal nerve, superficial peroneal nerve, med/lat tibial nerve, sural nerve, saphenous nerve  Intact EHL, EDL, TA, FHL, GS, quadriceps hamstrings and hip flexors    calf soft and nttp or squeeze.      LEFT KNEE EXAM:    Skin: intact, no ecchymosis or erythema   ROM: full extension to full flexion  Tight hamstrings on straight leg raise.  Effusion: none  Tender: popliteal fossa, medial and lateral joint line.  Grossly stable ligamentous exam.  Positive patello-femoral crepitus and grind tests.          X-RAY: bilateral sunrise views 4/23/2025;   2 views left knee , 2 views left femur from 4/20/2025 were reviewed in clinic today. On my review, no obvious fractures or dislocations. Left femur internal fixation changes with marked periosteal bone formation/mineralized callus about the femoral shaft fracture site, similar compared to previous. There is no evidence of an acute left femoral fracture. Intrapelvic IUD.     Left knee shows mild medial and patellofemoral compartment arthrosis without fracture or joint effusion..             Impression:  41yo with acute " "left knee pain status post fall, likely sprain/strain.    Plan:   Exam, images reviewed  No injury to femur, implant, prior fracture.  Knee looks fine, does have some osteoarthritis changes likely contributing to prior pains, \"clicking\"  Suspect recent injury maybe twisted it somehow falling backwards, aggravating underlying osteoarthritis, mild sprain  Rest, ice, elevation, activity modification, topical ointments, compression wrapping, over the counter pain medications as needed.  Consider therapy in future as needed.  Return to clinic as needed.    * All questions were addressed and answered prior to discharge from clinic today. The patient acknowledges an understanding of and agreement with the plan set forth during today's visit. Patient was advised to call our office or MyChart us if any further questions arise upon leaving our office today.           Jimmie Nino M.D., M.S.  Dept. of Orthopaedic Surgery  [facility]     "

## 2025-04-23 ENCOUNTER — ANCILLARY PROCEDURE (OUTPATIENT)
Dept: GENERAL RADIOLOGY | Facility: CLINIC | Age: 40
End: 2025-04-23
Attending: ORTHOPAEDIC SURGERY
Payer: COMMERCIAL

## 2025-04-23 ENCOUNTER — OFFICE VISIT (OUTPATIENT)
Dept: ORTHOPEDICS | Facility: CLINIC | Age: 40
End: 2025-04-23
Attending: EMERGENCY MEDICINE
Payer: COMMERCIAL

## 2025-04-23 VITALS — BODY MASS INDEX: 40.93 KG/M2 | WEIGHT: 231 LBS | HEIGHT: 63 IN

## 2025-04-23 DIAGNOSIS — S83.92XA SPRAIN OF LEFT KNEE, UNSPECIFIED LIGAMENT, INITIAL ENCOUNTER: Primary | ICD-10-CM

## 2025-04-23 DIAGNOSIS — S83.92XA SPRAIN OF LEFT KNEE, UNSPECIFIED LIGAMENT, INITIAL ENCOUNTER: ICD-10-CM

## 2025-04-23 PROCEDURE — 99213 OFFICE O/P EST LOW 20 MIN: CPT | Performed by: ORTHOPAEDIC SURGERY

## 2025-04-23 PROCEDURE — 1125F AMNT PAIN NOTED PAIN PRSNT: CPT | Performed by: ORTHOPAEDIC SURGERY

## 2025-04-23 PROCEDURE — 73560 X-RAY EXAM OF KNEE 1 OR 2: CPT | Mod: TC | Performed by: STUDENT IN AN ORGANIZED HEALTH CARE EDUCATION/TRAINING PROGRAM

## 2025-04-23 ASSESSMENT — PAIN SCALES - GENERAL: PAINLEVEL_OUTOF10: MODERATE PAIN (6)

## 2025-04-23 NOTE — LETTER
"4/23/2025      Divina Delgadillo  [address] [address]  [facility] [address]      Dear Colleague,    Thank you for referring your patient, Divina Delgadillo, to the [facility] [place]. Please see a copy of my visit note below.    chief complaint:   Chief Complaint   Patient presents with     Left Knee - Pain     Divina is here today for follow up left knee pain.  She was seen on 4.20.25 for this injury and was dx with a sprain.  Has swelling in the knee and down the leg.  Was recently started on some gabapentin and is having difficulty with memory at this time.         HISTORY OF PRESENT ILLNESS    Divina Delgadillo is a 40 year old female seen for evaluation of a left knee injury that occurred 3 days ago. The injury occurred 4/20/2025, fell backwards onto her left buttock, thought maybe the ulysses in her thigh drove into her knee. Now has pain in the left knee, behind the knee. Since injury, it is improving. Was seen in the ED with xrays of the femur, knee ok.    Recently started on gabapentin for neuropathy.    She's had problems with her knee in the past, pain, \"clicking\". No treatments.       She is status post IMN left femur fracture 1/2024 while in [place]. We had followed her postoperative, and has done well overall. Recall injury when she was in [place], on a [place] sidewalk at the airport and her boot caught and went flying into the air and landed on her left side.  She was taken to a hospital in [place], noted to have a left femur fracture, and had an intramedullary nail placed.        Present symptoms: moderate pain behind the knee, no swelling.  Symptoms occur with walking too much or sleeping on it wrong.  The frequency of symptoms frequently.  Pain severity: 6/10  Pain quality: sharp and shooting      Other PMH:  has a past medical history of Acquired asplenia, Acute pain of left knee (03/22/2019), Acute-on-chronic kidney injury (03/22/2019), ARF (acute renal failure) " (03/23/2019), Asthma, Bipolar disorder (H), Cardiac murmur, Cervical high risk HPV (human papillomavirus) test positive (06/20/2017), Chiari malformation type I (H), Chronic bilateral low back pain without sciatica, Deep venous thrombosis of arm (H) (01/06/2017), Depressive disorder, DVT (deep venous thrombosis) (H), DVT of upper extremity (deep vein thrombosis) (H) (2021), Esophageal reflux, Hypertension, Insomnia, Leukocytosis, Mild intermittent asthma, Morbid obesity (H), Mucinous neoplasm of pancreas, NAFL (nonalcoholic fatty liver), Neck pain, Nicotine abuse, Other specified types of schizophrenia, unspecified condition, Schizoaffective disorder, depressive type (H), Stage 3a chronic kidney disease (CKD) (H), Type 2 diabetes mellitus (H), Ulnar neuropathy of both upper extremities, and Vitamin D insufficiency.    She has no past medical history of Arthritis, Cerebral infarction (H), Congestive heart failure (H), COPD (chronic obstructive pulmonary disease) (H), Heart disease, History of blood transfusion, or Thyroid disease.  Patient Active Problem List    Diagnosis Date Noted     Mirena IUD 4/7/25 04/07/2025     Priority: Medium     Clinic Administered Medication Documentation      Intrauterine/Implant Insertion Documentation    Device was placed by provider (please see MAR for given by information). Please see MAR and medication order for additional information.     Type: Mirena  Remove/Replace by: 04/7/2033    Expiration Date:  05/2027           Secondary renal hyperparathyroidism 10/30/2024     Priority: Medium     Peroneal tendinitis of left lower extremity 06/14/2024     Priority: Medium     Acute left ankle pain 06/14/2024     Priority: Medium     Avascular necrosis of bone of ankle (H) 06/14/2024     Priority: Medium     Other fracture of left femur, initial encounter for closed fracture (H) 01/21/2024     Priority: Medium     Chiari malformation type I (H) 06/12/2023     Priority: Medium     Ulnar  neuropathy of both upper extremities 09/20/2022     Priority: Medium     Insomnia, unspecified type 08/30/2021     Priority: Medium     History of DVT of arm  (deep vein thrombosis) 01/23/2021     Priority: Medium     ultrasound 1/6/2017-  venous thrombus in the antecubital fossa region and the left forearm as described       Schizoaffective disorder, depressive type (H) 07/19/2019     Priority: Medium     Acquired asplenia 03/22/2019     Priority: Medium     Chronic leukocytosis 11/27/2018     Priority: Medium     Due to spleen removal       Nicotine abuse 08/13/2018     Priority: Medium     Mild intermittent asthma with acute exacerbation 01/16/2018     Priority: Medium     Morbid obesity (H) 01/09/2018     Priority: Medium     IUD (intrauterine device) in place 07/26/2017     Priority: Medium     Mirena IUD inserted in office with premed 7/26/2017         Cervical high risk HPV (human papillomavirus) test positive 06/20/2017     Priority: Medium     6/15/17 NIL, +HR HPV (not 16/18). Plan cotest in 1 year  6/14/18 NIL pap, neg HPV. Plan cotest in 3 years  3/26/21 NIL pap, Neg HPV. Plan cotest in 3 years.   4/24/24 NIL pap, +HR HPV (not 16/18). Plan: cotest in 1 year  4/7/25 NIL pap, Neg HPV. 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, splenectomy, left adrenalectomy 02/26/2015;       Type 2 diabetes mellitus with stage 3 chronic kidney disease, with long-term current use of insulin (H) 02/27/2017     Priority: Medium     Bipolar disorder (H) 02/27/2017     Priority: Medium     Orthostatic hypotension 01/10/2017     Priority: Medium     Tobacco abuse 01/10/2017     Priority: Medium     Long term (current) use of anticoagulants 01/09/2017     Priority: Medium     Deep venous thrombosis of arm (H) 01/09/2017     Priority: Medium     Stage 3 chronic kidney disease 12/03/2015     Priority:  Medium     Overview:   Updated per 10/1/17 IMO import       Neutrophilic leukocytosis 08/28/2015     Priority: Medium     Vitamin D insufficiency 06/01/2015     Priority: Medium     Cardiac murmur 08/20/2013     Priority: Medium     Depressive disorder 09/15/2012     Priority: Medium     Hyperlipidemia LDL goal <100 10/31/2010     Priority: Medium     Borderline personality disorder (H) 11/06/2009     Priority: Medium     Essential hypertension 06/13/2008     Priority: Medium     NAFL (nonalcoholic fatty liver) 04/11/2006     Priority: Medium     See flowsheet for hepatic panel.   Stopped zocor, was on godon as well had right upper quandrant ultrasound and ct  ? Psych med related vs SA[facility]NI       Esophageal reflux 04/14/2005     Priority: Medium     GERD       PTSD (post-traumatic stress disorder) 01/01/2001     Priority: Medium       Surgical Hx:  has a past surgical history that includes ENT surgery (10/01/2000); tonsillectomy (10/01/2000); splenectomy (2015); Adrenalectomy (Left, 2015); Pancreatectomy partial (2015); Percutaneous biopsy liver (Right, 11/14/2019); IR Liver Biopsy Percutaneous (11/14/2019); biopsy; Breast surgery (2013); and colonoscopy.    Medications:   Current Outpatient Medications:      acetaminophen (TYLENOL) 500 MG tablet, Take 1-2 tablets (500-1,000 mg) by mouth every 6 hours as needed for mild pain. (Patient not taking: Reported on 4/7/2025), Disp: 40 tablet, Rfl: 0     albuterol (VENTOLIN HFA) 108 (90 Base) MCG/ACT inhaler, INHALE 2 PUFFS INTO THE LUNGS EVERY SIX  HOURS AS NEEDED FOR SHORTNESS OF BREATH (Patient not taking: Reported on 4/7/2025), Disp: 18 g, Rfl: 5     amitriptyline (ELAVIL) 10 MG tablet, Take 1 tablet (10 mg) by mouth at bedtime., Disp: 90 tablet, Rfl: 1     ARIPiprazole (ABILIFY) 15 MG tablet, Take 1 Tablet (15 mg) by mouth once daily in the evening., Disp: , Rfl:      atorvastatin (LIPITOR) 20 MG tablet, Take 1 tablet (20 mg) by mouth daily, Disp: 30 tablet, Rfl: 0      "Biotin 5 MG TABS, Take 1 tablet by mouth daily, Disp: 100 tablet, Rfl: 1     blood glucose (ACCU-CHEK GUIDE) test strip, Use to test blood sugar 3 times daily or as directed., Disp: 100 strip, Rfl: 11     bumetanide (BUMEX) 0.5 MG tablet, Take 1 tablet (0.5 mg) by mouth daily (Patient not taking: Reported on 4/7/2025), Disp: 30 tablet, Rfl: 2     carvedilol (COREG) 12.5 MG tablet, Take 1 tablet (12.5 mg) by mouth 2 times daily, Disp: 60 tablet, Rfl: 11     cloZAPine (CLOZARIL) 50 MG tablet, Take 100 mg by mouth at bedtime. (Patient not taking: Reported on 4/7/2025), Disp: , Rfl:      docusate sodium (COLACE) 100 MG capsule, Take 1 capsule (100 mg) by mouth 2 times daily as needed for constipation., Disp: 60 capsule, Rfl: 5     FLUoxetine (PROZAC) 20 MG capsule, Take 3 Capsules (60 mg) by mouth once daily in the morning., Disp: , Rfl:      gabapentin (NEURONTIN) 300 MG capsule, Take 1 capsule (300 mg) by mouth 3 times daily., Disp: 90 capsule, Rfl: 0     glucose (BD GLUCOSE) 4 g chewable tablet, Take 1 tablet by mouth every hour as needed for low blood sugar, Disp: 30 tablet, Rfl: 4     HYDROcodone-acetaminophen (NORCO) 5-325 MG tablet, Take 1-2 tablets by mouth every 6 hours as needed for severe pain., Disp: 10 tablet, Rfl: 0     hydrOXYzine HCl (ATARAX) 25 MG tablet, Take 25 mg by mouth 3 times daily as needed for anxiety (Patient not taking: Reported on 4/7/2025), Disp: , Rfl:      insulin aspart (NOVOLOG VIAL) 100 UNITS/ML vial, for use with continuous insulin pump  Max TDD 80 (Patient not taking: Reported on 4/7/2025), Disp: 150 mL, Rfl: 1     Insulin Infusion Pump (MINIMED 780G INSULIN PUMP) KIT, 1 each daily SmartGuard Target: 100; Active Insulin Time: 2 hours (recommended); SmartGuardTM Warmup/Manual Mode: Suspend before low (recommended), Disp: 1 kit, Rfl: 0     Insulin Infusion Pump Supplies (EXTENDED INFUSION SET 23\"/6MM) MISC, 1 each every 7 days Max Total Daily Dose 70 units; Change infusion set & " reservoir every 7 days (recommended), Disp: 10 each, Rfl: 6     Insulin Infusion Pump Supplies (EXTENDED RESERVOIR 3ML) MISC, 1 each every 7 days, Disp: 10 each, Rfl: 11     lamoTRIgine (LAMICTAL) 100 MG tablet, Take 100 mg by mouth At Bedtime (Patient not taking: Reported on 4/7/2025), Disp: , Rfl:      levonorgestrel (MIRENA) 52 MG (20 mcg/day) IUD, 1 each by Intrauterine route once., Disp: , Rfl:      loratadine (CLARITIN) 10 MG tablet, Take 1 tablet (10 mg) by mouth every morning, Disp: 30 tablet, Rfl: 11     omeprazole (PRILOSEC) 20 MG DR capsule, Take 1 capsule (20 mg) by mouth 2 times daily. 30 minutes before meal on empty stomach., Disp: 90 capsule, Rfl: 2     QUEtiapine (SEROQUEL) 25 MG tablet, TAKE ONE TABLET BY MOUTH FOUR TIMES DAILY AS NEEDED FOR AGITATION OR HALLUCINATIONS (Patient not taking: Reported on 4/7/2025), Disp: , Rfl:      Suvorexant (BELSOMRA) 10 MG tablet, Take 10 mg by mouth at bedtime (Patient not taking: Reported on 4/7/2025), Disp: , Rfl:      topiramate (TOPAMAX) 25 MG tablet, Take 1 tablet (25 mg) by mouth 2 times daily. (Patient not taking: Reported on 4/7/2025), Disp: 60 tablet, Rfl: 2    Current Facility-Administered Medications:      levonorgestrel (MIRENA) 52 MG (20 mcg/day) IUD 1 each, 1 each, Intrauterine, See Admin Instructions, Irina Camejo MD, 1 each at 04/07/25 1413    Allergies:   Allergies   Allergen Reactions     Oxycodone-Acetaminophen Other (See Comments)     weston Bustillos       Social Hx:  reports that she has been smoking cigarettes and vaping device. She started smoking about 16 months ago. She has a 0.1 pack-year smoking history. She has been exposed to tobacco smoke. She has never used smokeless tobacco. She reports that she does not drink alcohol and does not use drugs.    Family Hx: family history includes Allergies in her brother, mother, sister, and sister; Anxiety Disorder in her father and mother; Asthma in her mother; Chronic Obstructive Pulmonary  "Disease in her mother; Depression in her father, mother, sister, and sister; Diabetes in her father; Heart Disease in her father; Hypertension in her father; Mental Illness in her father and mother; Obesity in her father; Respiratory in her brother, mother, sister, and sister; Sleep Apnea in her maternal grandfather.    REVIEW OF SYSTEMS: 10 point ROS neg other than the symptoms noted above in the HPI and PMH. Notables include  CONSTITUTIONAL:NEGATIVE for fever, chills, change in weight  INTEGUMENTARY/SKIN: NEGATIVE for worrisome rashes, moles or lesions  MUSCULOSKELETAL:See HPI above  NEURO: NEGATIVE for weakness, dizziness or paresthesias    PHYSICAL EXAM:  Ht 1.6 m (5' 3\")   Wt 104.8 kg (231 lb)   BMI 40.92 kg/m     GENERAL APPEARANCE: healthy, alert, no distress  SKIN: no suspicious lesions or rashes  NEURO: Normal strength and tone, mentation intact and speech normal  PSYCH:  mentation appears normal and affect normal, not anxious  RESPIRATORY: No increased work of breathing.  LYMPH: no palpable popliteal lymphadenopathy.    Hands: no clubbing, nail pitting or nodes.    BILATERAL LOWER EXTREMITIES:  Gait: slight favors the left.   Intact sensation deep peroneal nerve, superficial peroneal nerve, med/lat tibial nerve, sural nerve, saphenous nerve  Intact EHL, EDL, TA, FHL, GS, quadriceps hamstrings and hip flexors    calf soft and nttp or squeeze.      LEFT KNEE EXAM:    Skin: intact, no ecchymosis or erythema   ROM: full extension to full flexion  Tight hamstrings on straight leg raise.  Effusion: none  Tender: popliteal fossa, medial and lateral joint line.  Grossly stable ligamentous exam.  Positive patello-femoral crepitus and grind tests.          X-RAY: bilateral sunrise views 4/23/2025;   2 views left knee , 2 views left femur from 4/20/2025 were reviewed in clinic today. On my review, no obvious fractures or dislocations. Left femur internal fixation changes with marked periosteal bone " "formation/mineralized callus about the femoral shaft fracture site, similar compared to previous. There is no evidence of an acute left femoral fracture. Intrapelvic IUD.     Left knee shows mild medial and patellofemoral compartment arthrosis without fracture or joint effusion..             Impression:  39yo with acute left knee pain status post fall, likely sprain/strain.    Plan:   Exam, images reviewed  No injury to femur, implant, prior fracture.  Knee looks fine, does have some osteoarthritis changes likely contributing to prior pains, \"clicking\"  Suspect recent injury maybe twisted it somehow falling backwards, aggravating underlying osteoarthritis, mild sprain  Rest, ice, elevation, activity modification, topical ointments, compression wrapping, over the counter pain medications as needed.  Consider therapy in future as needed.  Return to clinic as needed.    * All questions were addressed and answered prior to discharge from clinic today. The patient acknowledges an understanding of and agreement with the plan set forth during today's visit. Patient was advised to call our office or MyChart us if any further questions arise upon leaving our office today.           Jimmie Nino M.D., M.S.  Dept. of Orthopaedic Surgery  [facility]         Again, thank you for allowing me to participate in the care of your patient.        Sincerely,        Jimmie Nino MD    Electronically signed"

## 2025-04-29 ENCOUNTER — HOSPITAL ENCOUNTER (EMERGENCY)
Facility: CLINIC | Age: 40
Discharge: HOME | End: 2025-04-29
Attending: EMERGENCY MEDICINE | Admitting: EMERGENCY MEDICINE
Payer: COMMERCIAL

## 2025-04-29 ENCOUNTER — APPOINTMENT (OUTPATIENT)
Dept: GENERAL RADIOLOGY | Facility: CLINIC | Age: 40
End: 2025-04-29
Attending: EMERGENCY MEDICINE
Payer: COMMERCIAL

## 2025-04-29 VITALS
SYSTOLIC BLOOD PRESSURE: 167 MMHG | RESPIRATION RATE: 18 BRPM | TEMPERATURE: 98.3 F | HEART RATE: 82 BPM | OXYGEN SATURATION: 98 % | DIASTOLIC BLOOD PRESSURE: 65 MMHG

## 2025-04-29 DIAGNOSIS — S76.912A MUSCLE STRAIN OF LEFT THIGH, INITIAL ENCOUNTER: ICD-10-CM

## 2025-04-29 PROCEDURE — 73552 X-RAY EXAM OF FEMUR 2/>: CPT | Mod: LT

## 2025-04-29 PROCEDURE — 99283 EMERGENCY DEPT VISIT LOW MDM: CPT | Performed by: EMERGENCY MEDICINE

## 2025-04-29 ASSESSMENT — COLUMBIA-SUICIDE SEVERITY RATING SCALE - C-SSRS
6. HAVE YOU EVER DONE ANYTHING, STARTED TO DO ANYTHING, OR PREPARED TO DO ANYTHING TO END YOUR LIFE?: YES
5. HAVE YOU STARTED TO WORK OUT OR WORKED OUT THE DETAILS OF HOW TO KILL YOURSELF? DO YOU INTEND TO CARRY OUT THIS PLAN?: NO
4. HAVE YOU HAD THESE THOUGHTS AND HAD SOME INTENTION OF ACTING ON THEM?: NO
3. HAVE YOU BEEN THINKING ABOUT HOW YOU MIGHT KILL YOURSELF?: YES
2. HAVE YOU ACTUALLY HAD ANY THOUGHTS OF KILLING YOURSELF IN THE PAST MONTH?: YES
1. IN THE PAST MONTH, HAVE YOU WISHED YOU WERE DEAD OR WISHED YOU COULD GO TO SLEEP AND NOT WAKE UP?: YES

## 2025-04-29 ASSESSMENT — ACTIVITIES OF DAILY LIVING (ADL)
ADLS_ACUITY_SCORE: 42
ADLS_ACUITY_SCORE: 42

## 2025-04-30 NOTE — ED PROVIDER NOTES
History     Chief Complaint   Patient presents with    Leg Pain     HPI  Divina Delgadillo is a 40 year old female with past medical history significant for esophageal reflux has mild DVT acute renal failure acute pain in the left knee type 2 diabetes hypertension schizoaffective disorder obstruction.  Who presents the emergency department complaining of left thigh and knee pain status post trauma.  Patient was in her kitchen and she twisted her knee and leg and had significant pain in her posterior thigh and knee.  She did not fall and is able to ambulate but it hurts to walk.  She denies any fevers or chills she did not fall did not hit her head denies any neck or back pain has no focal numbness weakness in extremity has not had bowel or bladder dysfunction.  Allergies:  Allergies   Allergen Reactions    Oxycodone-Acetaminophen Other (See Comments)     weston Bustillos       Problem List:    Patient Active Problem List    Diagnosis Date Noted    Mirena IUD 4/7/25 04/07/2025     Priority: Medium     Clinic Administered Medication Documentation      Intrauterine/Implant Insertion Documentation    Device was placed by provider (please see MAR for given by information). Please see MAR and medication order for additional information.     Type: Mirena  Remove/Replace by: 04/7/2033    Expiration Date:  05/2027          Secondary renal hyperparathyroidism 10/30/2024     Priority: Medium    Peroneal tendinitis of left lower extremity 06/14/2024     Priority: Medium    Acute left ankle pain 06/14/2024     Priority: Medium    Avascular necrosis of bone of ankle (H) 06/14/2024     Priority: Medium    Other fracture of left femur, initial encounter for closed fracture (H) 01/21/2024     Priority: Medium    Chiari malformation type I (H) 06/12/2023     Priority: Medium    Ulnar neuropathy of both upper extremities 09/20/2022     Priority: Medium    Insomnia, unspecified type 08/30/2021     Priority: Medium    History of DVT of  arm  (deep vein thrombosis) 01/23/2021     Priority: Medium     ultrasound 1/6/2017-  venous thrombus in the antecubital fossa region and the left forearm as described      Schizoaffective disorder, depressive type (H) 07/19/2019     Priority: Medium    Acquired asplenia 03/22/2019     Priority: Medium    Chronic leukocytosis 11/27/2018     Priority: Medium     Due to spleen removal      Nicotine abuse 08/13/2018     Priority: Medium    Mild intermittent asthma with acute exacerbation 01/16/2018     Priority: Medium    Morbid obesity (H) 01/09/2018     Priority: Medium    IUD (intrauterine device) in place 07/26/2017     Priority: Medium     Mirena IUD inserted in office with premed 7/26/2017        Cervical high risk HPV (human papillomavirus) test positive 06/20/2017     Priority: Medium     6/15/17 NIL, +HR HPV (not 16/18). Plan cotest in 1 year  6/14/18 NIL pap, neg HPV. Plan cotest in 3 years  3/26/21 NIL pap, Neg HPV. Plan cotest in 3 years.   4/24/24 NIL pap, +HR HPV (not 16/18). Plan: cotest in 1 year  4/7/25 NIL pap, Neg HPV. 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, splenectomy, left adrenalectomy 02/26/2015;      Type 2 diabetes mellitus with stage 3 chronic kidney disease, with long-term current use of insulin (H) 02/27/2017     Priority: Medium    Bipolar disorder (H) 02/27/2017     Priority: Medium    Orthostatic hypotension 01/10/2017     Priority: Medium    Tobacco abuse 01/10/2017     Priority: Medium    Long term (current) use of anticoagulants 01/09/2017     Priority: Medium    Deep venous thrombosis of arm (H) 01/09/2017     Priority: Medium    Stage 3 chronic kidney disease 12/03/2015     Priority: Medium     Overview:   Updated per 10/1/17 IMO import      Leukocytosis, unspecified type 08/28/2015     Priority: Medium    Vitamin D insufficiency 06/01/2015      Priority: Medium    Cardiac murmur 08/20/2013     Priority: Medium    Depressive disorder 09/15/2012     Priority: Medium    Hyperlipidemia LDL goal <100 10/31/2010     Priority: Medium    Borderline personality disorder (H) 11/06/2009     Priority: Medium    Essential hypertension 06/13/2008     Priority: Medium    NAFL (nonalcoholic fatty liver) 04/11/2006     Priority: Medium     See flowsheet for hepatic panel.   Stopped zocor, was on godon as well had right upper quandrant ultrasound and ct  ? Psych med related vs [facility]      Esophageal reflux 04/14/2005     Priority: Medium     GERD      PTSD (post-traumatic stress disorder) 01/01/2001     Priority: Medium        Past Medical History:    Past Medical History:   Diagnosis Date    Acquired asplenia     Acute pain of left knee 03/22/2019    Acute-on-chronic kidney injury 03/22/2019    ARF (acute renal failure) 03/23/2019    Asthma     Bipolar disorder (H)     Cardiac murmur     Cervical high risk HPV (human papillomavirus) test positive 06/20/2017    Chiari malformation type I (H)     Chronic bilateral low back pain without sciatica     Deep venous thrombosis of arm (H) 01/06/2017    Depressive disorder     DVT (deep venous thrombosis) (H)     DVT of upper extremity (deep vein thrombosis) (H) 2021    Esophageal reflux     Hypertension     Insomnia     Leukocytosis     Mild intermittent asthma     Morbid obesity (H)     Mucinous neoplasm of pancreas     NAFL (nonalcoholic fatty liver)     Neck pain     Nicotine abuse     Other specified types of schizophrenia, unspecified condition     Schizoaffective disorder, depressive type (H)     Stage 3a chronic kidney disease (CKD) (H)     Type 2 diabetes mellitus (H)     Ulnar neuropathy of both upper extremities     Vitamin D insufficiency        Past Surgical History:    Past Surgical History:   Procedure Laterality Date    ADRENALECTOMY Left 2015    BIOPSY      BREAST SURGERY  2013    COLONOSCOPY      ENT SURGERY   10/01/2000    Tympanoplasty    IR LIVER BIOPSY PERCUTANEOUS  11/14/2019    PANCREATECTOMY PARTIAL  2015    PERCUTANEOUS BIOPSY LIVER Right 11/14/2019    Procedure: Percutaneous Liver Biopsy;  Surgeon: Sean Guzman PA-C;  Location: [facility] OR    SPLENECTOMY  2015    TONSILLECTOMY  10/01/2000    Tonsillectomy       Family History:    Family History   Problem Relation Age of Onset    Respiratory Mother         ASTHMA    Allergies Mother     Depression Mother     Chronic Obstructive Pulmonary Disease Mother     Anxiety Disorder Mother     Mental Illness Mother     Asthma Mother     Heart Disease Father         HEART VALVE REPLACEMENT    Hypertension Father     Diabetes Father     Depression Father     Anxiety Disorder Father     Mental Illness Father     Obesity Father     Respiratory Sister     Allergies Sister     Depression Sister     Respiratory Sister     Allergies Sister     Depression Sister     Respiratory Brother     Allergies Brother     Sleep Apnea Maternal Grandfather     Kidney Disease No family hx of     Liver Disease No family hx of     Liver Cancer No family hx of        Social History:  Marital Status:  Single [1]  Social History     Tobacco Use    Smoking status: Every Day     Current packs/day: 0.50     Average packs/day: 0.5 packs/day for 1.4 years (0.7 ttl pk-yrs)     Types: Cigarettes, Vaping Device     Start date: 11/27/2023     Passive exposure: Yes    Smokeless tobacco: Never    Tobacco comments:     E- cig and cigarettes (2-3 cigarettes per day)   Vaping Use    Vaping status: Every Day    Substances: Nicotine, Flavoring    Devices: Disposable   Substance Use Topics    Alcohol use: No     Comment: sober since 2022    Drug use: No        Medications:    acetaminophen (TYLENOL) 500 MG tablet  albuterol (VENTOLIN HFA) 108 (90 Base) MCG/ACT inhaler  amitriptyline (ELAVIL) 10 MG tablet  ARIPiprazole (ABILIFY) 15 MG tablet  atorvastatin (LIPITOR) 20 MG tablet  Biotin 5 MG TABS  blood glucose  "(ACCU-CHEK GUIDE) test strip  bumetanide (BUMEX) 0.5 MG tablet  carvedilol (COREG) 12.5 MG tablet  cloZAPine (CLOZARIL) 50 MG tablet  docusate sodium (COLACE) 100 MG capsule  FLUoxetine (PROZAC) 20 MG capsule  gabapentin (NEURONTIN) 300 MG capsule  glucose (BD GLUCOSE) 4 g chewable tablet  HYDROcodone-acetaminophen (NORCO) 5-325 MG tablet  hydrOXYzine HCl (ATARAX) 25 MG tablet  insulin aspart (NOVOLOG VIAL) 100 UNITS/ML vial  Insulin Infusion Pump (MINIMED 780G INSULIN PUMP) KIT  Insulin Infusion Pump Supplies (EXTENDED INFUSION SET 23\"/6MM) MISC  Insulin Infusion Pump Supplies (EXTENDED RESERVOIR 3ML) MISC  lamoTRIgine (LAMICTAL) 100 MG tablet  levonorgestrel (MIRENA) 52 MG (20 mcg/day) IUD  loratadine (CLARITIN) 10 MG tablet  omeprazole (PRILOSEC) 20 MG DR capsule  QUEtiapine (SEROQUEL) 25 MG tablet  Suvorexant (BELSOMRA) 10 MG tablet  topiramate (TOPAMAX) 25 MG tablet          Review of Systems  As per HPI  Physical Exam   BP: (!) 167/65  Pulse: 82  Temp: 98.3  F (36.8  C)  Resp: 18  SpO2: 98 %      Physical Exam  Vitals and nursing note reviewed.   Constitutional:       General: She is not in acute distress.     Appearance: Normal appearance. She is normal weight. She is not ill-appearing, toxic-appearing or diaphoretic.   HENT:      Head: Normocephalic and atraumatic.      Nose: Nose normal.      Mouth/Throat:      Mouth: Mucous membranes are moist.      Pharynx: Oropharynx is clear.   Eyes:      Conjunctiva/sclera: Conjunctivae normal.   Pulmonary:      Effort: Pulmonary effort is normal.   Musculoskeletal:         General: Normal range of motion.      Cervical back: Normal range of motion and neck supple.      Comments: No midline back tenderness.  There is no hip tenderness present.  There is tenderness palpation the posterior thigh and knee region no erythema swelling or crepitance is noted patient able stand without difficulty pulses sensation symmetrical no calf swelling or pain is present no rashes " present.   Skin:     General: Skin is warm and dry.      Findings: No rash.   Neurological:      General: No focal deficit present.      Mental Status: She is alert and oriented to person, place, and time.      Sensory: No sensory deficit.      Motor: No weakness.      Coordination: Coordination normal.   Psychiatric:         Mood and Affect: Mood normal.     ED Course        Procedures              Critical Care time:  none     None         No results found. However, due to the size of the patient record, not all encounters were searched. Please check Results Review for a complete set of results.    Medications - No data to display    Assessments & Plan (with Medical Decision Making) records were reviewed including past medical history medications and allergies.  Office visits with behavioral health today was reviewed.  Office visit for sprain of the left knee was reviewed from 4/23/2025.  Patient has brought up to nursing staff that she is feeling very down today and thinking about cutting herself and had requested that a DEC consultation be done.  This has been ordered before I saw the patient.  X-ray of the left femur was obtained.  I independently reviewed and interpreted this imaging study and agree with the radiologist reading of long antegrade intramedullary ulysses fixation across the healed fracture of the proximal femoral diaphyses.  No evidence of hardware failure alignment is unchanged no definitive superimposed acute fracture.  Findings were discussed with patient in detai.  I believe this is more of a musculoskeletal strain and have recommended ibuprofen Tylenol and heat to the area.  If increased pain swelling numbness weakness patient should return for further evaluation and care.  A DEC consultation had been ordered but would take some time before it could be done.  I discussed this with the patient.  She states she is feeling better at this time and has never had any suicidal ideation but was thinking  of cutting herself.  She is no longer thinking about this and feels comfortable going home at this time.  She promises to return if any further thoughts to harm.  I feel this is reasonable.  She will be discharged in stable condition.     I have reviewed the nursing notes.    I have reviewed the findings, diagnosis, plan and need for follow up with the patient.             Discharge Medication List as of 4/29/2025  9:21 PM          Final diagnoses:   Muscle strain of left thigh, initial encounter       4/29/2025   [facility] EMERGENCY DEPT       Bonilla Harris MD  04/30/25 1124

## 2025-04-30 NOTE — DISCHARGE INSTRUCTIONS
Return if symptoms worsen or new symptoms develop.  Follow-up with primary care physician next available.  Use ice today and heat tomorrow.  Take ibuprofen or Tylenol for pain.  Elevate leg is much as possible.  He felt comfortable going home without talking to a DEC consultant.  You have no thoughts of harming yourself and feel comfortable going home.  You stated you would return if any further need for further evaluation.

## 2025-04-30 NOTE — ED TRIAGE NOTES
"Pt here with left leg pain after twisting it wrong in the kitchen. Pt is able to ambulate. Pt reports having a ulysses put in her leg 1 year ago. Pt also reports having a bad day and \"not feeling to good some stuff going on with my family\"   Pt took 25mg of hydrocodone at 12pm today         "

## 2025-05-01 ENCOUNTER — MYC MEDICAL ADVICE (OUTPATIENT)
Dept: FAMILY MEDICINE | Facility: CLINIC | Age: 40
End: 2025-05-01
Payer: COMMERCIAL

## 2025-05-01 ENCOUNTER — PATIENT OUTREACH (OUTPATIENT)
Dept: CARE COORDINATION | Facility: CLINIC | Age: 40
End: 2025-05-01
Payer: COMMERCIAL

## 2025-05-01 NOTE — PROGRESS NOTES
[facility] Care [facility]    Background: Primary Care-Care Coordination initial outreach identified per system criteria and reviewed by [facility] Resource [place] team.    Assessment: Upon chart review, [facility] Team member will not proceed with patient outreach related to this episode of Primary Care-Care Coordination program due to reason below:    Patient has discharged to a Memory Care, Long-term Care, Assisted Living or Group Home where patient is receiving on-site support with their daily cares, including support with ED follow up plan.    Plan: Primary Care-Care Coordination episode addressed appropriately per reason noted above.      FRANKLIN Rosen  [facility], [facility]    *Connected Care Resource Team does NOT follow patient ongoing. Referrals are identified based on internal discharge reports and the outreach is to ensure patient has an understanding of their discharge instructions.

## 2025-05-06 ENCOUNTER — DOCUMENTATION ONLY (OUTPATIENT)
Dept: GASTROENTEROLOGY | Facility: CLINIC | Age: 40
End: 2025-05-06
Payer: COMMERCIAL

## 2025-05-06 NOTE — PROGRESS NOTES
Called patient this morning and discussed recent FibroScan results with her.  Informed her that results were looking reassuring with FibroScan noting S0 steatosis and F0/F1 fibrosis.  Also told patient that I discussed her case with Dr. Leventhal, who she has previously seen in the clinic, regarding whether or not patient would need additional follow-up with the hepatology clinic.  He recommended that since patient remains on Clozaril, that she be seen for follow-up in the liver clinic in ~2 years with repeat FibroScan at that time--this appointment will be due March 2027.  I will message hepatology scheduling staff about this.  Also told patient to keep note that she will be due to see us back in 2 years.    She was appreciative of today's call and has no further questions or concerns at this time.    Ivelisse Sanchez PA-C  Hepatology

## 2025-05-07 ENCOUNTER — OFFICE VISIT (OUTPATIENT)
Dept: PHARMACY | Facility: CLINIC | Age: 40
End: 2025-05-07
Payer: COMMERCIAL

## 2025-05-07 VITALS — HEART RATE: 74 BPM | SYSTOLIC BLOOD PRESSURE: 137 MMHG | DIASTOLIC BLOOD PRESSURE: 58 MMHG

## 2025-05-07 DIAGNOSIS — G43.719 INTRACTABLE CHRONIC MIGRAINE WITHOUT AURA AND WITHOUT STATUS MIGRAINOSUS: Primary | ICD-10-CM

## 2025-05-07 DIAGNOSIS — K21.9 GASTROESOPHAGEAL REFLUX DISEASE WITHOUT ESOPHAGITIS: ICD-10-CM

## 2025-05-07 NOTE — PROGRESS NOTES
Medication Therapy Management (MTM) Encounter    ASSESSMENT:                            Medication Adherence/Access: No issues identified.    Migraines stable - if dizziness continues patient to follow up with VERONIQUE Spears CNP.    GERD stable    PLAN:                            1. Follow up with VERONIQUE Spears CNP about your dizziness.     Follow-up: Tuesday, July 8th at 10:30 AM    SUBJECTIVE/OBJECTIVE:                          Divina Delgadillo is a 40 year old female seen for a follow-up visit from 4/1/2025.       Reason for visit: follow up MTM visit.    Tobacco: She reports that she has been smoking cigarettes and vaping device. She started smoking about 17 months ago. She has a 0.7 pack-year smoking history. She has been exposed to tobacco smoke. She has never used smokeless tobacco.  Nicotine/Tobacco Cessation Plan  Information offered: Patient not interested at this time   Alcohol: none    Medication Adherence/Access: no issues reported.    Migraines  Topiramate 25 mg twice daily   Amitriptyline 10 mg at bedtime     Patient started taking her topiramate 50 mg at bedtime instead of taking 25 mg twice daily. Migraines are still controlled. She states she is still dizzy and that she has been dizzy for quite a few years (possibly since 2013). She did recently stop taking gabapentin last week - she states she was put on gabapentin and had a bad reaction to it, fell into a depression and doesn't remember the month that she was taking it. She states today, she does not think that her dizziness is medication related.     GERD  Omeprazole 20 mg twice daily    Patient states increase in omeprazole dose is working well. Followed by GI - patient has a history of an ulcer.      Today's Vitals: /58   Pulse 74   ----------------      I spent 20 minutes with this patient today. All changes were made via collaborative practice agreement with VERONIQUE Spears CNP.     A summary of these  recommendations was given to the patient.    Leana Hollis PharmD  Medication Therapy Management      Medication Therapy Recommendations  No medication therapy recommendations to display

## 2025-05-07 NOTE — PATIENT INSTRUCTIONS
"Recommendations from today's MTM visit:                                                         Divina,    It was a pleasure talking with you! We discussed the followin. Follow up with VERONIQUE Spears CNP about your dizziness.     Follow-up:  at 10:30 AM    It was great speaking with you today.  I value your experience and would be very thankful for your time in providing feedback in our clinic survey. In the next few days, you may receive an email or text message from [employer] with a link to a survey related to your  clinical pharmacist.\"     To schedule another MTM appointment, please call the clinic directly or you may call the MTM scheduling line at [phone] or toll-free at [phone].     My Clinical Pharmacist's contact information:                                                      Please feel free to contact me with any questions or concerns you have.      Keep up the good work!!    Leana Hollis, PharmD  [phone] in clinic on Tuesday and Wednesday        "

## 2025-05-08 ENCOUNTER — ALLIED HEALTH/NURSE VISIT (OUTPATIENT)
Dept: EDUCATION SERVICES | Facility: CLINIC | Age: 40
End: 2025-05-08
Payer: COMMERCIAL

## 2025-05-08 DIAGNOSIS — E11.22 TYPE 2 DIABETES MELLITUS WITH STAGE 3A CHRONIC KIDNEY DISEASE, WITH LONG-TERM CURRENT USE OF INSULIN (H): Primary | ICD-10-CM

## 2025-05-08 DIAGNOSIS — N18.31 TYPE 2 DIABETES MELLITUS WITH STAGE 3A CHRONIC KIDNEY DISEASE, WITH LONG-TERM CURRENT USE OF INSULIN (H): Primary | ICD-10-CM

## 2025-05-08 DIAGNOSIS — Z79.4 TYPE 2 DIABETES MELLITUS WITH STAGE 3A CHRONIC KIDNEY DISEASE, WITH LONG-TERM CURRENT USE OF INSULIN (H): Primary | ICD-10-CM

## 2025-05-08 NOTE — PROGRESS NOTES
Diabetes Self-Management Education & Support  Presents for: Insulin Pump Review  Type of Service: In Person Visit    ASSESSMENT  Divina's pump had a crack in it and advised to call for replacement asap as it is under warranty.  Scheduled visit today with [employer] rep to set up the replacement pump.  This is a known issue and the plastic / case has been updated by [employer].   Divina took her pump off last night (blood sugar 241); will resume pump today asap and take a correction dose for this.   Divina was able to do all of button pushing today during programming and has a very good understanding of the pup and screens.  Spent time reviewing manual mode, settings, what the settings mean etc.  Anticipate running a little higher for the next two days due to manual mode, but then will be able to resume automated dosing soon.      Changes made to pump settings:  basal rate: 1.2 --1.5 units/hour.  Automated dosing is much higher than manual at 48 units.  This brings manual rates from 28.8 --> 36 to better match (may need to increase further).  Divina is in automated mode as much as possible, but will be in manual for 48 hours with new pump hardware.   12:01am Sunday 5/11 can enter automated dosing.     correction/sensitivity:  36 (automated dosing was using 17 based on Rule of 1700). Adjusted to 25 for now to bridge the gap between manual and automated dosing.     REPORTS:          Education specific to insulin pump provided today on:   Pump Hardware & Software:   -- Navigating insulin pump screen/functions/features  -- Automated insulin delivery functions/features  -- Pump Alerts & Alarms   Bolusing:  -- How to use bolus calculator  Problem Solving:  Correction factor.  Automated dosing versus manual mode differences.   Continuous Glucose Monitoring:  -- Troubleshooting  -- CGM Alerts & Alarms       Care Plan and Education Provided:  See pump assessment and education above.   Patient verbalized understanding of diabetes  "self-management education concepts discussed, opportunities for ongoing education and support, and recommendations provided today.    Plan  Resumed pump today, watch blood sugars closely (for highs and lows) - especially with manual mode.   Suspend before low is on / sensor warming up   Hit the manual upload button in the [employer] [facility] this afternoon and evening to keep data pushing through for review  MyChart with abnormal blood sugars   Set in office follow up in 4-8 weeks for pump review     See Care Plan for co-developed, patient-state behavior change goals.    Subjective/Objective  Divina is an 40 year old, presenting for the following diabetes education related to: Insulin Pump Review  Accompanied by: Self  Diabetes education in the past 24mo: Yes  Focus of Visit: Insulin Pump  Type of Pump visit: Pump Review  Diabetes type: Type 2  Disease course: Improving  How confident are you filling out medical forms by yourself:: Quite a bit  Other concerns: None  Cultural Influences/Ethnic Background:  Not  or     Diabetes Symptoms & Complications:  Disease course: Improving     Patient Problem List and Family Medical History reviewed for relevant medical history, current medical status, and diabetes risk factors.    Vitals:  There were no vitals taken for this visit.  Estimated body mass index is 40.92 kg/m  as calculated from the following:    Height as of 4/23/25: 1.6 m (5' 3\").    Weight as of 4/23/25: 104.8 kg (231 lb).   Last 3 BP:   BP Readings from Last 3 Encounters:   05/07/25 137/58   04/29/25 (!) 167/65   04/21/25 (!) 172/52     History   Smoking Status    Every Day    Types: Cigarettes, Vaping Device   Smokeless Tobacco    Never       Labs:  Lab Results   Component Value Date     07/30/2024     07/30/2024    LDL 72 03/26/2021     HDL Cholesterol   Date Value Ref Range Status   03/26/2021 57 >49 mg/dL Final     Direct Measure HDL   Date Value Ref Range Status   07/30/2024 45 (L) " >=50 mg/dL Final     GFR Estimate   Date Value Ref Range Status   03/10/2025 33 (L) >60 mL/min/1.73m2 Final     Comment:     eGFR calculated using 2021 CKD-EPI equation.   07/08/2021 52 (L) >60 mL/min/[1.73_m2] Final     Comment:     Non  GFR Calc  Starting 12/18/2018, serum creatinine based estimated GFR (eGFR) will be   calculated using the Chronic Kidney Disease Epidemiology Collaboration   (CKD-EPI) equation.       GFR, ESTIMATED POCT   Date Value Ref Range Status   12/23/2024 30 (L) >60 mL/min/1.73m2 Final     Lab Results   Component Value Date    CR 1.93 03/10/2025    CR 1.32 07/08/2021     Lab Results   Component Value Date    MICROL 41.3 07/30/2024    UMALCR 15.50 07/30/2024    UCRR 16.9 03/10/2025           8/5/2024   Healthy Eating   Healthy Eating Assessed Today Yes   Cultural/[place]ian diet restrictions? No   Meal planning/habits Carb counting   How many times a week on average do you eat food made away from home (restaurant/take-out)? 3         8/5/2024   Being Active   Being Active Assessed Today Yes   Barrier to exercise Physical limitation         8/5/2024   Monitoring   Monitoring Assessed Today Yes   Blood Glucose Meter CGM   Times checking blood sugar at home (number) 5+   Times checking blood sugar at home (per) Day   Blood glucose trend Other     Diabetes Medication(s)       Diabetic Other       glucose (BD GLUCOSE) 4 g chewable tablet Take 1 tablet by mouth every hour as needed for low blood sugar       Insulin       insulin aspart (NOVOLOG VIAL) 100 UNITS/ML vial for use with continuous insulin pump  Max TDD 80              8/5/2024   Taking Medications   Current Treatments Insulin Injections   Dose schedule Pre-breakfast;Pre-lunch;Pre-dinner   Given by Patient         3/5/2024   Problem Solving   Problem Solving Assessed Today Yes   Is the patient at risk for hypoglycemia? Yes   Hypoglycemia Frequency Weekly   Hypoglycemia Treatment Juice;Candy;Other food;Glucose (tablets or  gel)   Hypoglycemia Dizziness/Lightheadedness;Hunger;Feeling shaky           8/5/2024   Reducing Risks   Reducing Risks Assessed Today Yes   CAD Risks Diabetes Mellitus;Family history;Hypertension;Stress   Has dilated eye exam at least once a year? No   Sees dentist every 6 months? No   Feet checked by healthcare provider in the last year? No         8/5/2024   Healthy Coping: Diabetes Distress Assessment   Healthy Coping Assessed Today Yes   Informal Support system: Family;Other;Brenda based       Dolly Riley RD, LD, [facility]  Diabetes Education    Time Spent: 30 minutes DSME   Encounter Type: Individual    Any diabetes medication dose changes were made via the [facility] Standing Orders under the patient's referring provider.

## 2025-05-08 NOTE — Clinical Note
5/8/2025         RE: Divina Delgadillo  [address]  [place] [address]        Dear Colleague,    Thank you for referring your patient, Divina Delgadillo, to the [facility] [place]. Please see a copy of my visit note below.    Diabetes Self-Management Education & Support  Presents for: Insulin Pump Review  Type of Service: In Person Visit    ASSESSMENT  Divina's pump had a crack in it and advised to call for replacement asap as it is under warranty.  Scheduled visit today with [employer] rep to set up the replacement pump.  This is a known issue and the plastic / case has been updated by [employer].   Divina took her pump off last night (blood sugar 241); will resume pump today asap and take a correction dose for this.   Divina was able to do all of button pushing today during programming and has a very good understanding of the pup and screens.  Spent time reviewing manual mode, settings, what the settings mean etc.  Anticipate running a little higher for the next two days due to manual mode, but then will be able to resume automated dosing soon.      Changes made to pump settings:  basal rate: 1.2 --1.5 units/hour.  Automated dosing is much higher than manual at 48 units.  This brings manual rates from 28.8 --> 36 to better match (may need to increase further).  Divina is in automated mode as much as possible, but will be in manual for 48 hours with new pump hardware.   12:01am Sunday 5/11 can enter automated dosing.     correction/sensitivity:  36 (automated dosing was using 17 based on Rule of 1700). Adjusted to 25 for now to bridge the gap between manual and automated dosing.     REPORTS:          Education specific to insulin pump provided today on:   Pump Hardware & Software:   -- Navigating insulin pump screen/functions/features  -- Automated insulin delivery functions/features  -- Pump Alerts & Alarms   Bolusing:  -- How to use bolus calculator  Problem  "Solving:  Correction factor.  Automated dosing versus manual mode differences.   Continuous Glucose Monitoring:  -- Troubleshooting  -- CGM Alerts & Alarms       Care Plan and Education Provided:  See pump assessment and education above.   Patient verbalized understanding of diabetes self-management education concepts discussed, opportunities for ongoing education and support, and recommendations provided today.    Plan  Resumed pump today, watch blood sugars closely (for highs and lows) - especially with manual mode.   Suspend before low is on / sensor warming up   Hit the manual upload button in the guardian kwesi this afternoon and evening to keep data pushing through for review  MyChart with abnormal blood sugars   Set in office follow up in 4-8 weeks for pump review     See Care Plan for co-developed, patient-state behavior change goals.    Subjective/Objective  Divina is an 40 year old, presenting for the following diabetes education related to: Insulin Pump Review  Accompanied by: Self  Diabetes education in the past 24mo: Yes  Focus of Visit: Insulin Pump  Type of Pump visit: Pump Review  Diabetes type: Type 2  Disease course: Improving  How confident are you filling out medical forms by yourself:: Quite a bit  Other concerns: None  Cultural Influences/Ethnic Background:  Not  or     Diabetes Symptoms & Complications:  Disease course: Improving     Patient Problem List and Family Medical History reviewed for relevant medical history, current medical status, and diabetes risk factors.    Vitals:  There were no vitals taken for this visit.  Estimated body mass index is 40.92 kg/m  as calculated from the following:    Height as of 4/23/25: 1.6 m (5' 3\").    Weight as of 4/23/25: 104.8 kg (231 lb).   Last 3 BP:   BP Readings from Last 3 Encounters:   05/07/25 137/58   04/29/25 (!) 167/65   04/21/25 (!) 172/52     History   Smoking Status     Every Day     Types: Cigarettes, Vaping Device   Smokeless " Tobacco     Never       Labs:  Lab Results   Component Value Date     07/30/2024     07/30/2024    LDL 72 03/26/2021     HDL Cholesterol   Date Value Ref Range Status   03/26/2021 57 >49 mg/dL Final     Direct Measure HDL   Date Value Ref Range Status   07/30/2024 45 (L) >=50 mg/dL Final     GFR Estimate   Date Value Ref Range Status   03/10/2025 33 (L) >60 mL/min/1.73m2 Final     Comment:     eGFR calculated using 2021 CKD-EPI equation.   07/08/2021 52 (L) >60 mL/min/[1.73_m2] Final     Comment:     Non  GFR Calc  Starting 12/18/2018, serum creatinine based estimated GFR (eGFR) will be   calculated using the Chronic Kidney Disease Epidemiology Collaboration   (CKD-EPI) equation.       GFR, ESTIMATED POCT   Date Value Ref Range Status   12/23/2024 30 (L) >60 mL/min/1.73m2 Final     Lab Results   Component Value Date    CR 1.93 03/10/2025    CR 1.32 07/08/2021     Lab Results   Component Value Date    MICROL 41.3 07/30/2024    UMALCR 15.50 07/30/2024    UCRR 16.9 03/10/2025           8/5/2024   Healthy Eating   Healthy Eating Assessed Today Yes   Cultural/Mandaen diet restrictions? No   Meal planning/habits Carb counting   How many times a week on average do you eat food made away from home (restaurant/take-out)? 3         8/5/2024   Being Active   Being Active Assessed Today Yes   Barrier to exercise Physical limitation         8/5/2024   Monitoring   Monitoring Assessed Today Yes   Blood Glucose Meter CGM   Times checking blood sugar at home (number) 5+   Times checking blood sugar at home (per) Day   Blood glucose trend Other     Diabetes Medication(s)       Diabetic Other       glucose (BD GLUCOSE) 4 g chewable tablet Take 1 tablet by mouth every hour as needed for low blood sugar       Insulin       insulin aspart (NOVOLOG VIAL) 100 UNITS/ML vial for use with continuous insulin pump  Max TDD 80              8/5/2024   Taking Medications   Current Treatments Insulin Injections    Dose schedule Pre-breakfast;Pre-lunch;Pre-dinner   Given by Patient         3/5/2024   Problem Solving   Problem Solving Assessed Today Yes   Is the patient at risk for hypoglycemia? Yes   Hypoglycemia Frequency Weekly   Hypoglycemia Treatment Juice;Candy;Other food;Glucose (tablets or gel)   Hypoglycemia Dizziness/Lightheadedness;Hunger;Feeling shaky           8/5/2024   Reducing Risks   Reducing Risks Assessed Today Yes   CAD Risks Diabetes Mellitus;Family history;Hypertension;Stress   Has dilated eye exam at least once a year? No   Sees dentist every 6 months? No   Feet checked by healthcare provider in the last year? No         8/5/2024   Healthy Coping: Diabetes Distress Assessment   Healthy Coping Assessed Today Yes   Informal Support system: Family;Other;Brenda based       Dolly Riley RD, LD, [facility]  Diabetes Education    Time Spent: 30 minutes DSME   Encounter Type: Individual    Any diabetes medication dose changes were made via the [facility] Standing Orders under the patient's referring provider.

## 2025-05-12 ENCOUNTER — MYC MEDICAL ADVICE (OUTPATIENT)
Dept: FAMILY MEDICINE | Facility: CLINIC | Age: 40
End: 2025-05-12
Payer: COMMERCIAL

## 2025-05-12 DIAGNOSIS — F25.1 SCHIZOAFFECTIVE DISORDER, DEPRESSIVE TYPE (H): Chronic | ICD-10-CM

## 2025-05-12 DIAGNOSIS — F31.9 BIPOLAR AFFECTIVE DISORDER, REMISSION STATUS UNSPECIFIED (H): Primary | Chronic | ICD-10-CM

## 2025-05-12 DIAGNOSIS — F32.A DEPRESSIVE DISORDER: Chronic | ICD-10-CM

## 2025-05-12 DIAGNOSIS — F60.3 BORDERLINE PERSONALITY DISORDER (H): Chronic | ICD-10-CM

## 2025-05-12 DIAGNOSIS — F43.10 PTSD (POST-TRAUMATIC STRESS DISORDER): Chronic | ICD-10-CM

## 2025-05-12 NOTE — TELEPHONE ENCOUNTER
Forwarding vChatter request for mental health referral to PCP for review please.   Referral is pended for consideration.    Thanks,  Mer Arthur RN  [facility]

## 2025-05-14 ENCOUNTER — PATIENT OUTREACH (OUTPATIENT)
Dept: CARE COORDINATION | Facility: CLINIC | Age: 40
End: 2025-05-14
Payer: COMMERCIAL

## 2025-05-15 ENCOUNTER — MYC MEDICAL ADVICE (OUTPATIENT)
Dept: NEPHROLOGY | Facility: CLINIC | Age: 40
End: 2025-05-15
Payer: COMMERCIAL

## 2025-05-15 DIAGNOSIS — E87.5 HYPERKALEMIA: Primary | ICD-10-CM

## 2025-05-15 DIAGNOSIS — I10 ESSENTIAL (PRIMARY) HYPERTENSION: ICD-10-CM

## 2025-05-15 DIAGNOSIS — L65.9 HAIR LOSS: ICD-10-CM

## 2025-05-15 DIAGNOSIS — E11.22 TYPE 2 DIABETES MELLITUS WITH STAGE 3A CHRONIC KIDNEY DISEASE, WITH LONG-TERM CURRENT USE OF INSULIN (H): ICD-10-CM

## 2025-05-15 DIAGNOSIS — G43.009 MIGRAINE WITHOUT AURA AND WITHOUT STATUS MIGRAINOSUS, NOT INTRACTABLE: ICD-10-CM

## 2025-05-15 DIAGNOSIS — Z79.4 TYPE 2 DIABETES MELLITUS WITH STAGE 3A CHRONIC KIDNEY DISEASE, WITH LONG-TERM CURRENT USE OF INSULIN (H): ICD-10-CM

## 2025-05-15 DIAGNOSIS — N18.31 TYPE 2 DIABETES MELLITUS WITH STAGE 3A CHRONIC KIDNEY DISEASE, WITH LONG-TERM CURRENT USE OF INSULIN (H): ICD-10-CM

## 2025-05-15 DIAGNOSIS — R60.0 LOWER EXTREMITY EDEMA: ICD-10-CM

## 2025-05-15 RX ORDER — BUMETANIDE 0.5 MG/1
0.5 TABLET ORAL DAILY
Qty: 90 TABLET | Refills: 3 | Status: SHIPPED | OUTPATIENT
Start: 2025-05-15

## 2025-05-15 NOTE — TELEPHONE ENCOUNTER
Nephrology Note: Medication Refill Request    Medication Refill Request:     Medication/Dose/Frequency: Bumex 0.5 mg                     SITUATION/BACKROUND:                 Last office visit: 2/24//25        Future office visit: 8/25/25     ASSESSMENT:     Neph Assessments:    Recent Labs:  CBC Results:  Recent Labs   Lab Test 04/01/25  1546   WBC 13.3*   RBC 4.00   HGB 11.3*   HCT 37.6   MCV 94   MCH 28.3   MCHC 30.1*   RDW 14.4        Last Renal Panel:  Sodium   Date Value Ref Range Status   03/10/2025 141 135 - 145 mmol/L Final   07/08/2021 139 133 - 144 mmol/L Final     Potassium   Date Value Ref Range Status   03/10/2025 5.8 (H) 3.4 - 5.3 mmol/L Final   12/19/2022 5.1 3.4 - 5.3 mmol/L Final   07/08/2021 4.4 3.4 - 5.3 mmol/L Final     Chloride   Date Value Ref Range Status   03/10/2025 106 98 - 107 mmol/L Final   12/19/2022 112 (H) 94 - 109 mmol/L Final   07/08/2021 108 94 - 109 mmol/L Final     Carbon Dioxide   Date Value Ref Range Status   07/08/2021 26 20 - 32 mmol/L Final     Carbon Dioxide (CO2)   Date Value Ref Range Status   03/10/2025 25 22 - 29 mmol/L Final   12/19/2022 26 20 - 32 mmol/L Final     Anion Gap   Date Value Ref Range Status   03/10/2025 10 7 - 15 mmol/L Final   12/19/2022 3 3 - 14 mmol/L Final   07/08/2021 5 3 - 14 mmol/L Final     Glucose   Date Value Ref Range Status   03/10/2025 148 (H) 70 - 99 mg/dL Final   12/19/2022 258 (H) 70 - 99 mg/dL Final   07/08/2021 187 (H) 70 - 99 mg/dL Final     GLUCOSE BY METER POCT   Date Value Ref Range Status   09/18/2023 121 (H) 70 - 99 mg/dL Final     Urea Nitrogen   Date Value Ref Range Status   03/10/2025 47.1 (H) 6.0 - 20.0 mg/dL Final   12/19/2022 19 7 - 30 mg/dL Final   07/08/2021 22 7 - 30 mg/dL Final     Creatinine   Date Value Ref Range Status   03/10/2025 1.93 (H) 0.51 - 0.95 mg/dL Final   07/08/2021 1.32 (H) 0.52 - 1.04 mg/dL Final     GFR Estimate   Date Value Ref Range Status   03/10/2025 33 (L) >60 mL/min/1.73m2 Final     Comment:      eGFR calculated using 2021 CKD-EPI equation.   07/08/2021 52 (L) >60 mL/min/[1.73_m2] Final     Comment:     Non  GFR Calc  Starting 12/18/2018, serum creatinine based estimated GFR (eGFR) will be   calculated using the Chronic Kidney Disease Epidemiology Collaboration   (CKD-EPI) equation.       GFR, ESTIMATED POCT   Date Value Ref Range Status   12/23/2024 30 (L) >60 mL/min/1.73m2 Final     Calcium   Date Value Ref Range Status   03/10/2025 10.4 8.8 - 10.4 mg/dL Final   07/08/2021 9.2 8.5 - 10.1 mg/dL Final     Phosphorus   Date Value Ref Range Status   02/10/2025 4.4 2.5 - 4.5 mg/dL Final   06/15/2021 3.2 2.5 - 4.5 mg/dL Final     Albumin   Date Value Ref Range Status   03/25/2025 4.0 3.5 - 5.2 g/dL Final   12/19/2022 3.0 (L) 3.4 - 5.0 g/dL Final   07/08/2021 3.4 3.4 - 5.0 g/dL Final       Current Medication List:   Current Outpatient Medications (Antihypertensive, Cardiovascular, Diuretics, Beta blockers, Calcium blockers, Anticoagulants)   Medication Sig    bumetanide (BUMEX) 0.5 MG tablet Take 1 tablet (0.5 mg) by mouth daily (Patient not taking: Reported on 5/7/2025)    carvedilol (COREG) 12.5 MG tablet Take 1 tablet (12.5 mg) by mouth 2 times daily     Current Outpatient Medications (Other)   Medication Sig    acetaminophen (TYLENOL) 500 MG tablet Take 1-2 tablets (500-1,000 mg) by mouth every 6 hours as needed for mild pain.    albuterol (VENTOLIN HFA) 108 (90 Base) MCG/ACT inhaler INHALE 2 PUFFS INTO THE LUNGS EVERY SIX  HOURS AS NEEDED FOR SHORTNESS OF BREATH    amitriptyline (ELAVIL) 10 MG tablet Take 1 tablet (10 mg) by mouth at bedtime.    ARIPiprazole (ABILIFY) 15 MG tablet Take 1 Tablet (15 mg) by mouth once daily in the evening.    atorvastatin (LIPITOR) 20 MG tablet Take 1 tablet (20 mg) by mouth daily    Biotin 5 MG TABS Take 1 tablet by mouth daily    blood glucose (ACCU-CHEK GUIDE) test strip Use to test blood sugar 3 times daily or as directed.    cloZAPine (CLOZARIL) 50 MG  "tablet Take 100 mg by mouth at bedtime.    docusate sodium (COLACE) 100 MG capsule Take 1 capsule (100 mg) by mouth 2 times daily as needed for constipation.    FLUoxetine (PROZAC) 20 MG capsule Take 3 Capsules (60 mg) by mouth once daily in the morning.    glucose (BD GLUCOSE) 4 g chewable tablet Take 1 tablet by mouth every hour as needed for low blood sugar    HYDROcodone-acetaminophen (NORCO) 5-325 MG tablet Take 1-2 tablets by mouth every 6 hours as needed for severe pain.    hydrOXYzine HCl (ATARAX) 25 MG tablet Take 25 mg by mouth 3 times daily as needed for anxiety.    insulin aspart (NOVOLOG VIAL) 100 UNITS/ML vial for use with continuous insulin pump  Max TDD 80    Insulin Infusion Pump (MINIMED 780G INSULIN PUMP) KIT 1 each daily SmartGuard Target: 100; Active Insulin Time: 2 hours (recommended); SmartGuardTM Warmup/Manual Mode: Suspend before low (recommended)    Insulin Infusion Pump Supplies (EXTENDED INFUSION SET 23\"/6MM) MISC 1 each every 7 days Max Total Daily Dose 70 units; Change infusion set & reservoir every 7 days (recommended)    Insulin Infusion Pump Supplies (EXTENDED RESERVOIR 3ML) MISC 1 each every 7 days    lamoTRIgine (LAMICTAL) 100 MG tablet Take 100 mg by mouth at bedtime.    levonorgestrel (MIRENA) 52 MG (20 mcg/day) IUD 1 each by Intrauterine route once.    loratadine (CLARITIN) 10 MG tablet Take 1 tablet (10 mg) by mouth every morning    omeprazole (PRILOSEC) 20 MG DR capsule Take 1 capsule (20 mg) by mouth 2 times daily. 30 minutes before meal on empty stomach.    QUEtiapine (SEROQUEL) 25 MG tablet  (Patient taking differently: Take 25-50 mg by mouth 3 times daily as needed.)    Suvorexant (BELSOMRA) 10 MG tablet Take 10 mg by mouth at bedtime.    topiramate (TOPAMAX) 25 MG tablet Take 1 tablet (25 mg) by mouth 2 times daily.       Has patient had kidney transplant in the prior 1 year: no.   Has patient been seen the last 12 months: Yes.  Associated labs reviewed for medication: " Yes  Patient with known CKD. Per Dr. Dias last office visit:   CKD Stage 3b: risk factors for kidney disease include longstanding hypertension, diabetes, as well as longterm previous lithium use. She is now away from lithium which is great to see given she is already at risk for diabetic nephropathy and would like to minimize risk.  Given no proteinuria, will hold lisinopril given hemodynamic/KIRSTIE/hyperkalemic events that have occurred while on low dose ACE-I.     PLAN:     Medication refilled per protocol: No, routed to provider to advise. Last potassium from March 2025 was elevated at 5.8.     Shelley Vail RN

## 2025-05-20 DIAGNOSIS — E78.5 HYPERLIPIDEMIA LDL GOAL <100: ICD-10-CM

## 2025-05-20 RX ORDER — TOPIRAMATE 25 MG/1
25 TABLET, FILM COATED ORAL 2 TIMES DAILY
Qty: 60 TABLET | Refills: 2 | Status: SHIPPED | OUTPATIENT
Start: 2025-05-20

## 2025-05-20 RX ORDER — ATORVASTATIN CALCIUM 20 MG/1
20 TABLET, FILM COATED ORAL DAILY
Qty: 90 TABLET | Refills: 0 | Status: SHIPPED | OUTPATIENT
Start: 2025-05-20

## 2025-05-20 RX ORDER — LISINOPRIL 2.5 MG/1
2.5 TABLET ORAL DAILY
Qty: 30 TABLET | Refills: 5 | OUTPATIENT
Start: 2025-05-20

## 2025-05-20 RX ORDER — OMEGA-3S/DHA/EPA/FISH OIL 1000-1400
5 CAPSULE,DELAYED RELEASE (ENTERIC COATED) ORAL DAILY
Qty: 100 CAPSULE | Refills: 0 | Status: SHIPPED | OUTPATIENT
Start: 2025-05-20

## 2025-05-20 NOTE — TELEPHONE ENCOUNTER
Covering for primary/ordering provider    Lisinopril was was stopped by nephrology on 2/24/2025.  Refill declined.  Refills of other medications sent

## 2025-05-30 ENCOUNTER — HOSPITAL ENCOUNTER (EMERGENCY)
Facility: CLINIC | Age: 40
Discharge: HOME | End: 2025-05-30
Attending: EMERGENCY MEDICINE | Admitting: EMERGENCY MEDICINE
Payer: COMMERCIAL

## 2025-05-30 VITALS
SYSTOLIC BLOOD PRESSURE: 150 MMHG | BODY MASS INDEX: 40.75 KG/M2 | HEART RATE: 71 BPM | OXYGEN SATURATION: 93 % | DIASTOLIC BLOOD PRESSURE: 69 MMHG | TEMPERATURE: 98.2 F | WEIGHT: 230 LBS | HEIGHT: 63 IN

## 2025-05-30 DIAGNOSIS — H81.399 PERIPHERAL VERTIGO, UNSPECIFIED LATERALITY: ICD-10-CM

## 2025-05-30 LAB
GLUCOSE BLDC GLUCOMTR-MCNC: 86 MG/DL (ref 70–99)
GLUCOSE BLDC GLUCOMTR-MCNC: 93 MG/DL (ref 70–99)

## 2025-05-30 PROCEDURE — 99283 EMERGENCY DEPT VISIT LOW MDM: CPT | Performed by: EMERGENCY MEDICINE

## 2025-05-30 PROCEDURE — 250N000013 HC RX MED GY IP 250 OP 250 PS 637: Performed by: EMERGENCY MEDICINE

## 2025-05-30 PROCEDURE — 82962 GLUCOSE BLOOD TEST: CPT

## 2025-05-30 PROCEDURE — 99284 EMERGENCY DEPT VISIT MOD MDM: CPT | Performed by: EMERGENCY MEDICINE

## 2025-05-30 RX ORDER — LORAZEPAM 1 MG/1
1 TABLET ORAL ONCE
Status: COMPLETED | OUTPATIENT
Start: 2025-05-30 | End: 2025-05-30

## 2025-05-30 RX ORDER — LORAZEPAM 0.5 MG/1
0.5 TABLET ORAL EVERY 8 HOURS PRN
Qty: 12 TABLET | Refills: 0 | Status: SHIPPED | OUTPATIENT
Start: 2025-05-30

## 2025-05-30 RX ORDER — METOCLOPRAMIDE 10 MG/1
10 TABLET ORAL 4 TIMES DAILY PRN
Qty: 24 TABLET | Refills: 1 | Status: SHIPPED | OUTPATIENT
Start: 2025-05-30

## 2025-05-30 RX ORDER — METOCLOPRAMIDE 10 MG/1
10 TABLET ORAL ONCE
Status: COMPLETED | OUTPATIENT
Start: 2025-05-30 | End: 2025-05-30

## 2025-05-30 RX ADMIN — METOCLOPRAMIDE 10 MG: 10 TABLET ORAL at 17:11

## 2025-05-30 RX ADMIN — LORAZEPAM 1 MG: 1 TABLET ORAL at 17:11

## 2025-05-30 ASSESSMENT — ACTIVITIES OF DAILY LIVING (ADL)
ADLS_ACUITY_SCORE: 42

## 2025-05-30 NOTE — ED PROVIDER NOTES
"  History     Chief Complaint   Patient presents with    Dizziness     HPI  History per patient, review of [facility] EMR and Care Everywhere EMR.   Divina Delgadillo is a 40 year old female with history of peripheral vertigo treated with meclizine as needed, schizoaffective disorder, bipolar affective disorder, borderline personality disorder, hypertension, insulin-dependent diabetes mellitus type 2, stage III chronic kidney disease and acquired asplenia who presents emergency department for vertiginous dizziness refractory to meclizine.  Acute exacerbation of chronic intermittent frequency vertigo which began yesterday.  No unusual characteristics or features of recurrent dizziness exacerbated by change in position or head movement.  She has ear fullness but no ear pain.  Mild recent URI symptoms which have resolved.  No tear pain or acute hearing loss.  She uses hearing aids.  No visual, motor or sensory deficit or abnormality.  No other acute problems, complaints or concerns.       Previous Records Reviewed:   Telephone encounter from earlier today 1:37 PM:  S-(situation): worsening dizziness past few days     B-(background): pt has hx dizziness x 1 yr off/on. Tried meclizine with no relief. Pt states dizziness has been worsening over past few weeks & is severe the past few days. Pt having dizziness when standing. Episodes come on every 5 min. Pt currently sitting & was feeling ok then stated dizziness was starting.     A-(assessment): dizziness, lightheadedness pretty constant if standing. Pt feels faint/woozy/weak when standing. Feels off balance. Pt states her friend had to catch her the other day because her legs almost gave out. Feels off balance & has to grab the wall. Feels room spinning/tilting at time (none right now while sitting). States \"feels like I don't know where my feet are at\" & will have to sit down.  Denies fever, CP, n/v/d, bleeding, fever. No vision issues.  Pt does state she has some SOA.   Pt " also states will get a pounding headache at times.  Pt drinking lots of water.      R-(recommendations): protocol advises ER/UC. Pt agrees & will have someone take her to the [place] [facility] now.     Patrica Schmidt RN  Most recent MRI brain study reviewed.  1/26/24 MR BRAIN W/O CONTRAST  Impression    IMPRESSION:  1.  No acute infarct.  2.  Up to 12 mm cerebellar tonsillar ectopia nearly to the inferior margin of the C1 posterior arch with resultant crowding of foramen magnum.  In the absence of any clinical features of intracranial hypotension, most consistent with Chiari I/I.5 malformation.      Allergies:  Allergies   Allergen Reactions    Gabapentin Other (See Comments)     Interfered with psych meds causing angry episode    Oxycodone-Acetaminophen Other (See Comments)     weston Bustillos       Problem List:    Patient Active Problem List    Diagnosis Date Noted    Mirena IUD 4/7/25 04/07/2025     Priority: Medium     Clinic Administered Medication Documentation      Intrauterine/Implant Insertion Documentation    Device was placed by provider (please see MAR for given by information). Please see MAR and medication order for additional information.     Type: Mirena  Remove/Replace by: 04/7/2033    Expiration Date:  05/2027          Secondary renal hyperparathyroidism 10/30/2024     Priority: Medium    Peroneal tendinitis of left lower extremity 06/14/2024     Priority: Medium    Acute left ankle pain 06/14/2024     Priority: Medium    Avascular necrosis of bone of ankle (H) 06/14/2024     Priority: Medium    Other fracture of left femur, initial encounter for closed fracture (H) 01/21/2024     Priority: Medium    Chiari malformation type I (H) 06/12/2023     Priority: Medium    Ulnar neuropathy of both upper extremities 09/20/2022     Priority: Medium    Insomnia, unspecified type 08/30/2021     Priority: Medium    History of DVT of arm  (deep vein thrombosis) 01/23/2021     Priority: Medium     ultrasound 1/6/2017-  venous  thrombus in the antecubital fossa region and the left forearm as described      Schizoaffective disorder, depressive type (H) 07/19/2019     Priority: Medium    Acquired asplenia 03/22/2019     Priority: Medium    Chronic leukocytosis 11/27/2018     Priority: Medium     Due to spleen removal      Nicotine abuse 08/13/2018     Priority: Medium    Mild intermittent asthma with acute exacerbation 01/16/2018     Priority: Medium    Morbid obesity (H) 01/09/2018     Priority: Medium    IUD (intrauterine device) in place 07/26/2017     Priority: Medium     Mirena IUD inserted in office with premed 7/26/2017        Cervical high risk HPV (human papillomavirus) test positive 06/20/2017     Priority: Medium     6/15/17 NIL, +HR HPV (not 16/18). Plan cotest in 1 year  6/14/18 NIL pap, neg HPV. Plan cotest in 3 years  3/26/21 NIL pap, Neg HPV. Plan cotest in 3 years.   4/24/24 NIL pap, +HR HPV (not 16/18). Plan: cotest in 1 year  4/7/25 NIL pap, Neg HPV. 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, splenectomy, left adrenalectomy 02/26/2015;      Type 2 diabetes mellitus with stage 3 chronic kidney disease, with long-term current use of insulin (H) 02/27/2017     Priority: Medium    Bipolar disorder (H) 02/27/2017     Priority: Medium    Orthostatic hypotension 01/10/2017     Priority: Medium    Tobacco abuse 01/10/2017     Priority: Medium    Long term (current) use of anticoagulants 01/09/2017     Priority: Medium    Deep venous thrombosis of arm (H) 01/09/2017     Priority: Medium    Stage 3 chronic kidney disease 12/03/2015     Priority: Medium     Overview:   Updated per 10/1/17 IMO import      Leukocytosis, unspecified type 08/28/2015     Priority: Medium    Vitamin D insufficiency 06/01/2015     Priority: Medium    Cardiac murmur 08/20/2013     Priority: Medium    Depressive disorder  09/15/2012     Priority: Medium    Hyperlipidemia LDL goal <100 10/31/2010     Priority: Medium    Borderline personality disorder (H) 11/06/2009     Priority: Medium    Essential hypertension 06/13/2008     Priority: Medium    NAFL (nonalcoholic fatty liver) 04/11/2006     Priority: Medium     See flowsheet for hepatic panel.   Stopped zocor, was on godon as well had right upper quandrant ultrasound and ct  ? Psych med related vs [facility]      Esophageal reflux 04/14/2005     Priority: Medium     GERD      PTSD (post-traumatic stress disorder) 01/01/2001     Priority: Medium        Past Medical History:    Past Medical History:   Diagnosis Date    Acquired asplenia     Acute pain of left knee 03/22/2019    Acute-on-chronic kidney injury 03/22/2019    ARF (acute renal failure) 03/23/2019    Asthma     Bipolar disorder (H)     Cardiac murmur     Cervical high risk HPV (human papillomavirus) test positive 06/20/2017    Chiari malformation type I (H)     Chronic bilateral low back pain without sciatica     Deep venous thrombosis of arm (H) 01/06/2017    Depressive disorder     DVT (deep venous thrombosis) (H)     DVT of upper extremity (deep vein thrombosis) (H) 2021    Esophageal reflux     Hypertension     Insomnia     Leukocytosis     Mild intermittent asthma     Morbid obesity (H)     Mucinous neoplasm of pancreas     NAFL (nonalcoholic fatty liver)     Neck pain     Nicotine abuse     Other specified types of schizophrenia, unspecified condition     Schizoaffective disorder, depressive type (H)     Stage 3a chronic kidney disease (CKD) (H)     Type 2 diabetes mellitus (H)     Ulnar neuropathy of both upper extremities     Vitamin D insufficiency        Past Surgical History:    Past Surgical History:   Procedure Laterality Date    ADRENALECTOMY Left 2015    BIOPSY      BREAST SURGERY  2013    COLONOSCOPY      ENT SURGERY  10/01/2000    Tympanoplasty    IR LIVER BIOPSY PERCUTANEOUS  11/14/2019    PANCREATECTOMY PARTIAL   2015    PERCUTANEOUS BIOPSY LIVER Right 11/14/2019    Procedure: Percutaneous Liver Biopsy;  Surgeon: Sean Guzman PA-C;  Location: [facility] OR    SPLENECTOMY  2015    TONSILLECTOMY  10/01/2000    Tonsillectomy       Family History:    Family History   Problem Relation Age of Onset    Respiratory Mother         ASTHMA    Allergies Mother     Depression Mother     Chronic Obstructive Pulmonary Disease Mother     Anxiety Disorder Mother     Mental Illness Mother     Asthma Mother     Heart Disease Father         HEART VALVE REPLACEMENT    Hypertension Father     Diabetes Father     Depression Father     Anxiety Disorder Father     Mental Illness Father     Obesity Father     Respiratory Sister     Allergies Sister     Depression Sister     Respiratory Sister     Allergies Sister     Depression Sister     Respiratory Brother     Allergies Brother     Sleep Apnea Maternal Grandfather     Kidney Disease No family hx of     Liver Disease No family hx of     Liver Cancer No family hx of        Social History:  Marital Status:  Single [1]  Social History     Tobacco Use    Smoking status: Every Day     Current packs/day: 0.50     Average packs/day: 0.5 packs/day for 1.5 years (0.8 ttl pk-yrs)     Types: Cigarettes, Vaping Device     Start date: 11/27/2023     Passive exposure: Yes    Smokeless tobacco: Never    Tobacco comments:     E- cig and cigarettes (2-3 cigarettes per day)   Vaping Use    Vaping status: Every Day    Substances: Nicotine, Flavoring    Devices: Disposable   Substance Use Topics    Alcohol use: No     Comment: sober since 2022    Drug use: No        Medications:    LORazepam (ATIVAN) 0.5 MG tablet  metoclopramide (REGLAN) 10 MG tablet  acetaminophen (TYLENOL) 500 MG tablet  albuterol (VENTOLIN HFA) 108 (90 Base) MCG/ACT inhaler  amitriptyline (ELAVIL) 10 MG tablet  ARIPiprazole (ABILIFY) 15 MG tablet  atorvastatin (LIPITOR) 20 MG tablet  biotin (BIOTIN MAXIMUM STRENGTH) 5 MG CAPS  blood glucose  "(ACCU-CHEK GUIDE) test strip  bumetanide (BUMEX) 0.5 MG tablet  carvedilol (COREG) 12.5 MG tablet  cloZAPine (CLOZARIL) 50 MG tablet  docusate sodium (COLACE) 100 MG capsule  FLUoxetine (PROZAC) 20 MG capsule  glucose (BD GLUCOSE) 4 g chewable tablet  HYDROcodone-acetaminophen (NORCO) 5-325 MG tablet  hydrOXYzine HCl (ATARAX) 25 MG tablet  insulin aspart (NOVOLOG VIAL) 100 UNITS/ML vial  Insulin Infusion Pump (MINIMED 780G INSULIN PUMP) KIT  Insulin Infusion Pump Supplies (EXTENDED INFUSION SET 23\"/6MM) MISC  Insulin Infusion Pump Supplies (EXTENDED RESERVOIR 3ML) MISC  lamoTRIgine (LAMICTAL) 100 MG tablet  levonorgestrel (MIRENA) 52 MG (20 mcg/day) IUD  loratadine (CLARITIN) 10 MG tablet  omeprazole (PRILOSEC) 20 MG DR capsule  QUEtiapine (SEROQUEL) 25 MG tablet  Suvorexant (BELSOMRA) 10 MG tablet  topiramate (TOPAMAX) 25 MG tablet        Review of Systems  As mentioned in the HPI, in addition focused review of systems was negative.    Physical Exam   BP: (!) 168/70  Pulse: 72  Temp: 98.2  F (36.8  C)  Height: 160 cm (5' 3\")  Weight: 104.3 kg (230 lb)  SpO2: 92 %      Physical Exam  Vitals and nursing note reviewed.   Constitutional:       General: She is not in acute distress.     Appearance: Normal appearance. She is well-developed. She is not ill-appearing or diaphoretic.   HENT:      Head: Normocephalic and atraumatic.      Right Ear: Tympanic membrane, ear canal and external ear normal. There is no impacted cerumen.      Left Ear: Tympanic membrane, ear canal and external ear normal. There is no impacted cerumen.      Ears:      Comments: Mild bilateral serous effusions.     Nose: Nose normal.      Mouth/Throat:      Mouth: Mucous membranes are moist.      Pharynx: Oropharynx is clear.   Eyes:      General: No scleral icterus.     Extraocular Movements: Extraocular movements intact.      Conjunctiva/sclera: Conjunctivae normal.      Pupils: Pupils are equal, round, and reactive to light.      Comments: 1-2 " beats of horizontal nystagmus to lateral gaze bilaterally, extinguishes on repeat testing.   Neck:      Thyroid: No thyromegaly.      Vascular: Normal carotid pulses. No carotid bruit.      Trachea: Trachea and phonation normal. No tracheal deviation.   Cardiovascular:      Rate and Rhythm: Normal rate and regular rhythm.      Pulses: Normal pulses.      Heart sounds: Normal heart sounds. No murmur heard.     No friction rub. No gallop.   Pulmonary:      Effort: Pulmonary effort is normal. No respiratory distress.      Breath sounds: Normal breath sounds. No wheezing, rhonchi or rales.   Musculoskeletal:         General: Normal range of motion.      Cervical back: Normal range of motion and neck supple.      Right lower leg: No edema.      Left lower leg: No edema.   Skin:     General: Skin is warm and dry.      Coloration: Skin is not pale.      Findings: No erythema or rash.   Neurological:      General: No focal deficit present.      Mental Status: She is alert and oriented to person, place, and time.      Cranial Nerves: Cranial nerves 2-12 are intact. No cranial nerve deficit, dysarthria or facial asymmetry.      Sensory: Sensation is intact. No sensory deficit.      Motor: Motor function is intact. No weakness.      Coordination: Coordination is intact. Coordination normal.      Gait: Gait is intact. Gait normal.   Psychiatric:         Mood and Affect: Mood normal.         Behavior: Behavior normal.         ED Course        Procedures                Results for orders placed or performed during the hospital encounter of 05/30/25 (from the past 24 hours)   Glucose by meter   Result Value Ref Range    GLUCOSE BY METER POCT 93 70 - 99 mg/dL   Glucose by meter   Result Value Ref Range    GLUCOSE BY METER POCT 86 70 - 99 mg/dL     *Note: Due to a large number of results and/or encounters for the requested time period, some results have not been displayed. A complete set of results can be found in Results Review.        Medications   metoclopramide (REGLAN) tablet 10 mg (10 mg Oral $Given 5/30/25 1711)   LORazepam (ATIVAN) tablet 1 mg (1 mg Oral $Given 5/30/25 1711)     Doubt CVA, emergent neurologic, neurovascular or vascular disease process and I do not feel that laboratory evaluation or imaging evaluation is currently indicated on an emergent basis.  Will try symptomatic treatment and reassess.    She feels improved after Ativan and Reglan and is comfortable with discharge home.    Assessments & Plan (with Medical Decision Making)   40 year old female with history of peripheral vertigo treated with meclizine as needed, schizoaffective disorder, bipolar affective disorder, borderline personality disorder, hypertension, insulin-dependent diabetes mellitus type 2 with vertiginous dizziness refractory to meclizine.  Acute exacerbation of chronic intermittent frequency vertigo which began yesterday.  No unusual characteristics or features of recurrent dizziness exacerbated by change in position or head movement.  Mild benign extinguishable horizontal nystagmus and bilateral serous ear effusions and otherwise normal exam.  She felt improved after Ativan and Reglan and I will prescribe these for her to use if needed, in addition to meclizine.  I will make a referral to physical therapy for vestibular therapy, and an ENT referral for follow-up.  I doubt central vertigo, CVA, TIA, vertebrobasilar insufficiency or other emergent disease process.  She is comfortable today's evaluation and disposition plan.  I also recommended follow-up in primary care clinic next week if her symptoms are not improving.  She return as needed for new or worsening symptoms and was discharged with instructions for supportive care of benign peripheral vertigo.    I have reviewed the nursing notes.    I have reviewed the findings, diagnosis, plan and need for follow up with the patient.    Medical Decision Making: Moderate complexity      New Prescriptions     LORAZEPAM (ATIVAN) 0.5 MG TABLET    Take 1 tablet (0.5 mg) by mouth every 8 hours as needed (Ssevere vertigo).    METOCLOPRAMIDE (REGLAN) 10 MG TABLET    Take 1 tablet (10 mg) by mouth 4 times daily as needed (for nausea, dizziness or vertigo).       Final diagnoses:   Peripheral vertigo, unspecified laterality       5/30/2025   [facility] EMERGENCY DEPT       Jama De Luna MD  06/03/25 0111

## 2025-05-30 NOTE — LETTER
May 30, 2025      To Whom It May Concern:      Divina Delgadillo was seen in our Emergency Department today, Friday 05/30/25.  I expect her condition to improve over the next several days.  She may need to take prescribed medication and may need to be off work this weekend.    Sincerely,        HAFSA De Luna MD  Electronically signed     Quality 226: Preventive Care And Screening: Tobacco Use: Screening And Cessation Intervention: Patient screened for tobacco use and is an ex/non-smoker Quality 130: Documentation Of Current Medications In The Medical Record: Current Medications Documented Quality 431: Preventive Care And Screening: Unhealthy Alcohol Use - Screening: Patient not identified as an unhealthy alcohol user when screened for unhealthy alcohol use using a systematic screening method Quality 110: Preventive Care And Screening: Influenza Immunization: Influenza immunization was not ordered or administered, reason not given Detail Level: Detailed

## 2025-05-30 NOTE — ED TRIAGE NOTES
Patient having dizziness since yesterday. Worse today. Tried meclizine at home yesterday, states it did not improve. Diagnosed with vertigo a year ago. Also type 1 diabetic, has a pump.      Triage Assessment (Adult)       Row Name 05/30/25 1416          Triage Assessment    Airway WDL WDL        Respiratory WDL    Respiratory WDL WDL        Skin Circulation/Temperature WDL    Skin Circulation/Temperature WDL WDL        Cardiac WDL    Cardiac WDL WDL        Peripheral/Neurovascular WDL    Peripheral Neurovascular WDL WDL        Cognitive/Neuro/Behavioral WDL    Cognitive/Neuro/Behavioral WDL X  dizziness     Level of Consciousness alert     Arousal Level opens eyes spontaneously     Orientation oriented x 4     Speech clear     Mood/Behavior calm;cooperative        Pupils (CN II)    Pupil PERRLA yes     Pupil Size Left 3 mm     Pupil Size Right 3 mm        Cheo Coma Scale    Best Eye Response 4-->(E4) spontaneous     Best Motor Response 6-->(M6) obeys commands     Best Verbal Response 5-->(V5) oriented     Cheo Coma Scale Score 15

## 2025-06-02 ENCOUNTER — PATIENT OUTREACH (OUTPATIENT)
Dept: CARE COORDINATION | Facility: CLINIC | Age: 40
End: 2025-06-02
Payer: COMMERCIAL

## 2025-06-04 DIAGNOSIS — H60.393 INFECTIVE OTITIS EXTERNA, BILATERAL: ICD-10-CM

## 2025-06-04 DIAGNOSIS — H73.23 MYRINGITIS OF BOTH EARS: ICD-10-CM

## 2025-06-04 RX ORDER — CIPROFLOXACIN AND DEXAMETHASONE 3; 1 MG/ML; MG/ML
SUSPENSION/ DROPS AURICULAR (OTIC)
Qty: 0.1 ML | Refills: 0 | OUTPATIENT
Start: 2025-06-04

## 2025-06-04 NOTE — TELEPHONE ENCOUNTER
Patient's last ENT visit was 12/2024. Patient was seen in the ED recently and advised to follow up with PCP.    Patient has an Audio and ENT follow up on 8/20/25. Can provide more refills of Ciprodex at that time. Patient just picked up a refill of this on 6/3/25.    Bianca Dias RN on 6/4/2025 at 9:20 AM

## 2025-06-09 ENCOUNTER — TELEPHONE (OUTPATIENT)
Dept: FAMILY MEDICINE | Facility: CLINIC | Age: 40
End: 2025-06-09
Payer: COMMERCIAL

## 2025-06-09 DIAGNOSIS — F43.10 PTSD (POST-TRAUMATIC STRESS DISORDER): Chronic | ICD-10-CM

## 2025-06-09 DIAGNOSIS — F32.A DEPRESSIVE DISORDER: Chronic | ICD-10-CM

## 2025-06-09 DIAGNOSIS — M80.00XD OSTEOPOROSIS WITH CURRENT PATHOLOGICAL FRACTURE WITH ROUTINE HEALING, UNSPECIFIED OSTEOPOROSIS TYPE, SUBSEQUENT ENCOUNTER: ICD-10-CM

## 2025-06-09 DIAGNOSIS — F25.1 SCHIZOAFFECTIVE DISORDER, DEPRESSIVE TYPE (H): Primary | Chronic | ICD-10-CM

## 2025-06-09 DIAGNOSIS — F60.3 BORDERLINE PERSONALITY DISORDER (H): Chronic | ICD-10-CM

## 2025-06-09 RX ORDER — ALENDRONATE SODIUM 70 MG/1
70 TABLET ORAL
Qty: 4 TABLET | Refills: 2 | OUTPATIENT
Start: 2025-06-09

## 2025-06-09 NOTE — TELEPHONE ENCOUNTER
Called Suzanne for more info. Pts psychiatrist Dr Ross at [facility] is retiring.  would like pt to get established with psych at [facility] and for medication management    Sobeida Hunt on 6/9/2025 at 1:41 PM

## 2025-06-09 NOTE — TELEPHONE ENCOUNTER
Patient had normal DEXA scan and I advised her she does not need Fosamax at this time.    VERONIQUE Spears CNP

## 2025-06-09 NOTE — TELEPHONE ENCOUNTER
Order/Referral Request    Who is requesting: Suzanne Galan  for mental health from [facility]    Orders being requested: mental health    Reason service is needed/diagnosis: for medication management    When are orders needed by: asap with in the month    Has this been discussed with Provider: No    Does patient have a preference on a Group/Provider/Facility?  [facility]    Does patient have an appointment scheduled?: No    Where to send orders: Place orders within Epic    Suzanne Galan  for mental health from [facility]  [phone]

## 2025-06-09 NOTE — TELEPHONE ENCOUNTER
This was discontinued in April but it looks like the patient just refilled it May 21st.   Is she supposed to be taking this?    Anderson AGUSTIN RN  [facility] Nurse

## 2025-06-10 NOTE — TELEPHONE ENCOUNTER
I signed mental health referral for patient, so she can establish with new psychiatrist.    VERONIQUE Spears CNP

## 2025-06-11 ENCOUNTER — PATIENT OUTREACH (OUTPATIENT)
Dept: CARE COORDINATION | Facility: CLINIC | Age: 40
End: 2025-06-11
Payer: COMMERCIAL

## 2025-06-12 ENCOUNTER — PATIENT OUTREACH (OUTPATIENT)
Dept: BEHAVIORAL HEALTH | Facility: CLINIC | Age: 40
End: 2025-06-12
Payer: COMMERCIAL

## 2025-06-12 ENCOUNTER — OFFICE VISIT (OUTPATIENT)
Dept: BEHAVIORAL HEALTH | Facility: CLINIC | Age: 40
End: 2025-06-12
Payer: COMMERCIAL

## 2025-06-12 DIAGNOSIS — E11.22 TYPE 2 DIABETES MELLITUS WITH STAGE 3A CHRONIC KIDNEY DISEASE, WITH LONG-TERM CURRENT USE OF INSULIN (H): ICD-10-CM

## 2025-06-12 DIAGNOSIS — F25.1 SCHIZOAFFECTIVE DISORDER, DEPRESSIVE TYPE (H): Primary | Chronic | ICD-10-CM

## 2025-06-12 DIAGNOSIS — Z79.4 TYPE 2 DIABETES MELLITUS WITH STAGE 3A CHRONIC KIDNEY DISEASE, WITH LONG-TERM CURRENT USE OF INSULIN (H): ICD-10-CM

## 2025-06-12 DIAGNOSIS — N18.31 TYPE 2 DIABETES MELLITUS WITH STAGE 3A CHRONIC KIDNEY DISEASE, WITH LONG-TERM CURRENT USE OF INSULIN (H): ICD-10-CM

## 2025-06-12 PROCEDURE — 90832 PSYTX W PT 30 MINUTES: CPT

## 2025-06-12 RX ORDER — INSULIN ASPART 100 [IU]/ML
INJECTION, SOLUTION INTRAVENOUS; SUBCUTANEOUS
Qty: 20 ML | Refills: 14 | Status: SHIPPED | OUTPATIENT
Start: 2025-06-12

## 2025-06-12 ASSESSMENT — PATIENT HEALTH QUESTIONNAIRE - PHQ9
10. IF YOU CHECKED OFF ANY PROBLEMS, HOW DIFFICULT HAVE THESE PROBLEMS MADE IT FOR YOU TO DO YOUR WORK, TAKE CARE OF THINGS AT HOME, OR GET ALONG WITH OTHER PEOPLE: SOMEWHAT DIFFICULT
SUM OF ALL RESPONSES TO PHQ QUESTIONS 1-9: 13
SUM OF ALL RESPONSES TO PHQ QUESTIONS 1-9: 13

## 2025-06-12 NOTE — PROGRESS NOTES
[facility] Primary Care: Integrated Behavioral Health    Integrated Behavioral Health   Mental Health & Addiction Services      Progress Note - Initial [facility] Visit     Patient Name: Divina Delgadillo    Date: June 12, 2025  Service Type: Individual   Visit Start Time: 11:03am  Visit End Time:  {Video Visit Start Time:177682}   Attendees: Patient   Service Modality: {Service Modality:429864}     [facility] Visit Activities (Refresh list every visit): {REFRESH & RESELECT EVERY ENCOUNTER:539097}         DATA:     Interactive Complexity: {59220 add on - Interactive Complexity:644589}   Crisis: {91654 (60 min) / 92252 (30 min add-on; stackable if needed):390744}     Assessments completed:     The following assessments were completed by patient for this visit:  PHQ2:   Phq2 (   1999 [employer],All Rights Reserved. Used With Permission. Developed By Jamilah Fuentes,Aide Carrasco,Melchor Davila And Colleagues,With An Educational Joce From [employer].)    1/2/2025 12:42 PM CST - Filed by Patient   The following questionnaire should only be answered by the patient. Are you the patient? Yes   Over the last 2 weeks, how often have you been bothered by any of the following problems?    Q1: Little interest or pleasure in doing things Not at all   Q2: Feeling down, depressed or hopeless Not at all   PHQ2 (   1999 [employer],ALL RIGHTS RESERVED. USED WITH PERMISSION. DEVELOPED BY JAMILAH FUENTES,AIDE CARRASCO,MELCHOR DAVILA AND COLLEAGUES,WITH AN EDUCATIONAL JOCE FROM [employer].)    PHQ-2 Score (range: 0 - 6) 0       PHQ9:       2/14/2019    12:00 PM 7/6/2022     1:13 PM 1/2/2023     2:19 PM 2/21/2023    12:42 PM 6/1/2023     3:10 PM 12/13/2023     9:54 AM 6/12/2025    10:06 AM   PHQ-9 SCORE   PHQ-9 Total Score MyChart  4 (Minimal depression)  11 (Moderate depression) 2 (Minimal depression) 9 (Mild depression) 13 (Moderate depression)   PHQ-9 Total Score 12  4 11 11 1 9 13         Patient-reported    Data saved with a previous flowsheet row definition     GAD2:        No data to display              GAD7:       5/18/2017     8:00 AM 12/7/2017    11:00 AM 5/23/2018     4:00 PM 11/27/2018     3:00 PM 2/14/2019    12:00 PM 1/2/2023     2:19 PM   NELSON-7 SCORE   Total Score 0  7 2 7 10 4       Data saved with a previous flowsheet row definition     CAGE-AID:        No data to display              PROMIS 10-Global Health (only subscores and total score):       6/12/2025    10:07 AM   PROMIS-10 Scores Only   Global Mental Health Score 8    Global Physical Health Score 10    PROMIS TOTAL - SUBSCORES 18        Patient-reported     Baker Suicide Severity Rating Scale (Lifetime/Recent)      8/2/2024    10:40 PM 1/10/2025    12:22 PM 2/2/2025     9:48 PM 2/23/2025     5:13 PM 4/20/2025     6:36 PM 4/29/2025     7:09 PM 5/30/2025     2:18 PM   Baker Suicide Severity Rating (Lifetime/Recent)   Q1 Wished to be Dead (Past Month) 0-->no 0-->no 0-->no 0-->no 1-->yes 1-->yes 0-->no   Q2 Suicidal Thoughts (Past Month) 0-->no 0-->no 0-->no 0-->no 0-->no 1-->yes 0-->no   Q3 Suicidal Thought Method      1-->yes    Q4 Suicidal Intent without Specific Plan      0-->no    Q5 Suicide Intent with Specific Plan      0-->no    Q6 Suicide Behavior (Lifetime) 0-->no 0-->no 0-->no 0-->no 1-->yes 1-->yes 0-->no   If yes to Q6, within past 3 months?     0-->no 0-->no    Level of Risk per Screen no risks indicated no risks indicated no risks indicated no risks indicated moderate risk moderate risk no risks indicated        Referral:   Patient was referred to [facility] by {Merged with [facility] REFFERED BY:154725}.    Reason for referral: {[facility] REFERRAL REASONS:866320}.      [facility] introduced self and role. Discussed informed consent and limits to confidentiality.     Presenting Concerns/ Current Stressors:   ***     Therapeutic Interventions:  {[facility] Interventions:819953}    Response to treatment interventions:   {Patient response to  intervention:636290}    Safety Issues and Plan for Safety and Risk Management:     Patient {Risk History:256182}   Patient {PERSONAL SAFETY:952076}   Patient {SUICIDAL IDEATION:840567}   Patient {HOMICIDAL IDEATION:018126}   Patient {SELF INJURIOUS:139492}   Patient {OTHER SAFETY CONCERNS:205578}   {SAFETY PLAN:111714}   Patient reports there are {Firearms:324053}.       ASSESSMENT:   Mental Status:     Appearance:   {Appearance:665418}   Eye Contact:   {Eye Contact:176931}   Psychomotor Behavior: {Psychomotor Behavior:332611}   Attitude:   {Attitude:188480}   Orientation:   {Orientation:730168}   Speech Rate / Production: {Speech Rate/Production:663114}   Volume:   {Speech Volume:707699}   Mood:    {Mood:719402}   Affect:    {Affect:968747}   Thought Content:  {Thought Content:001025}   Thought Form:  {Thought Form:120206}   Insight:    {Insight:262759}        Diagnostic Criteria:   {DSM5 Classifications and Criteria:909429}        DSM5 Diagnoses: (Sustained by DSM5 Criteria Listed Above)     Diagnoses: {DSM5 MH Diagnosis:997514}     Psychosocial / Contextual Factors: {Psychosocial/Contextual Factors:216701}       Collateral Reports Completed:   {[facility] COLLATERAL:746684}        PLAN: (Homework, other):     1. Patient was provided:  {Patient was provided:002203}     2. Provider recommended the following referrals: ***.        3. Suicide Risk and Safety Concerns were assessed for Divina Delgadillo    Safety Plan:   {Safety Plan:024318}       {[facility]:860808}   June 12, 2025    period of illness during which there is a major mood episode (major depressive or manic) concurrent with Criteria A of schizophrenia. The major depressive episode must include Criteria A1: Depressed mood., B.  Delusions or hallucinations for 2 or more week in the absence of a major mood episode (depressive or manic) during the lifetime duration of the illness., C.  Symptoms that meet criteria for a major mood episode are present for the majority of the total duration of the active and residual portions of the illness. , and D.  The disturbance is not attributable to the effects of a substance or another medical condition.        DSM5 Diagnoses: (Sustained by DSM5 Criteria Listed Above)     Diagnoses: 295.70  (F25.1) Schizoaffective Disorder Depressive Type, previously dx    Psychosocial / Contextual Factors: Medical Complexities, Trauma History, and Interpersonal Concerns       Collateral Reports Completed:   Not Applicable        PLAN: (Homework, other):     1. Patient was provided:  recommendation to schedule follow-up with [facility]     2. Provider recommended the following referrals: Will establish with a long term therapist.        3. Suicide Risk and Safety Concerns were assessed for Divina Delgadillo    Safety Plan:   Patient agreed to follow safety plan   [facility] Safety Plan      Creation Date: 6/12/25 Last Update Date: 6/12/25      Step 1: Warning signs:    Warning Signs    Hallucinations    Anger    Arguments/disagreements with friends/family    depression    low self worth-not feeling like she deserves things including food      Step 2: Internal coping strategies - Things I can do to take my mind off my problems without contacting another person:    Strategies    play with cat    breathing exercises    praying    leave appt      Step 3: People and social settings that provide distraction:    Name Contact Information    Mom has number    Friend-Mayelin has number    Aunt-Sallie has number    Friend-Yarelis  Has number    Friend-Say        Places    Leave apt and go hang out with someone    Gym    Going to work      Step 4: People whom I can ask for help during a crisis:    Name Contact Information    Mom       Step 5: Professionals or agencies I can contact during a crisis:    Clinician/Agency Name Phone Emergency Contact    [facility]      -Suzanne Galan        [facility] Emergency [facility] Emergency Department Address Emergency Department Phone    [facility]        Suicide Prevention Lifeline Phone: Call or Text 923  Crisis Text Line: Text HOME to [phone]     Step 6: Making the environment safer (plan for lethal means safety):   Did not identify any lethal methods     Optional: What is most important to me and worth living for?:   Cat, Family and Friends     [facility] Safety Plan. Ruthann Antony and Donnell Fitzgerald. Used with permission of the authors.            [facility], [facility]   June 12, 2025   Answers submitted by the patient for this visit:  Patient Health Questionnaire (Submitted on 6/12/2025)  If you checked off any problems, how difficult have these problems made it for you to do your work, take care of things at home, or get along with other people?: Somewhat difficult  PHQ9 TOTAL SCORE: 13

## 2025-06-16 ENCOUNTER — PATIENT OUTREACH (OUTPATIENT)
Dept: CARE COORDINATION | Facility: CLINIC | Age: 40
End: 2025-06-16
Payer: COMMERCIAL

## 2025-06-18 ENCOUNTER — LAB (OUTPATIENT)
Dept: LAB | Facility: CLINIC | Age: 40
End: 2025-06-18
Payer: COMMERCIAL

## 2025-06-18 ENCOUNTER — PATIENT OUTREACH (OUTPATIENT)
Dept: CARE COORDINATION | Facility: CLINIC | Age: 40
End: 2025-06-18

## 2025-06-18 DIAGNOSIS — E11.22 TYPE 2 DIABETES MELLITUS WITH STAGE 3B CHRONIC KIDNEY DISEASE, WITH LONG-TERM CURRENT USE OF INSULIN (H): ICD-10-CM

## 2025-06-18 DIAGNOSIS — F25.0 SCHIZOAFFECTIVE DISORDER, BIPOLAR TYPE (H): ICD-10-CM

## 2025-06-18 DIAGNOSIS — Z79.899 HIGH RISK MEDICATION USE: ICD-10-CM

## 2025-06-18 DIAGNOSIS — Z79.4 TYPE 2 DIABETES MELLITUS WITH STAGE 3B CHRONIC KIDNEY DISEASE, WITH LONG-TERM CURRENT USE OF INSULIN (H): ICD-10-CM

## 2025-06-18 DIAGNOSIS — N18.32 TYPE 2 DIABETES MELLITUS WITH STAGE 3B CHRONIC KIDNEY DISEASE, WITH LONG-TERM CURRENT USE OF INSULIN (H): ICD-10-CM

## 2025-06-18 LAB
BASOPHILS # BLD AUTO: 0.1 10E3/UL (ref 0–0.2)
BASOPHILS NFR BLD AUTO: 1 %
EOSINOPHIL # BLD AUTO: 0.3 10E3/UL (ref 0–0.7)
EOSINOPHIL NFR BLD AUTO: 2 %
ERYTHROCYTE [DISTWIDTH] IN BLOOD BY AUTOMATED COUNT: 16.1 % (ref 10–15)
EST. AVERAGE GLUCOSE BLD GHB EST-MCNC: 192 MG/DL
HBA1C MFR BLD: 8.3 % (ref 0–5.6)
HCT VFR BLD AUTO: 37.8 % (ref 35–47)
HGB BLD-MCNC: 11.1 G/DL (ref 11.7–15.7)
IMM GRANULOCYTES # BLD: 0.1 10E3/UL
IMM GRANULOCYTES NFR BLD: 1 %
LYMPHOCYTES # BLD AUTO: 3.5 10E3/UL (ref 0.8–5.3)
LYMPHOCYTES NFR BLD AUTO: 26 %
MCH RBC QN AUTO: 26.5 PG (ref 26.5–33)
MCHC RBC AUTO-ENTMCNC: 29.4 G/DL (ref 31.5–36.5)
MCV RBC AUTO: 90 FL (ref 78–100)
MONOCYTES # BLD AUTO: 0.7 10E3/UL (ref 0–1.3)
MONOCYTES NFR BLD AUTO: 5 %
NEUTROPHILS # BLD AUTO: 9 10E3/UL (ref 1.6–8.3)
NEUTROPHILS NFR BLD AUTO: 66 %
PLATELET # BLD AUTO: 424 10E3/UL (ref 150–450)
RBC # BLD AUTO: 4.19 10E6/UL (ref 3.8–5.2)
WBC # BLD AUTO: 13.7 10E3/UL (ref 4–11)

## 2025-06-18 PROCEDURE — 83036 HEMOGLOBIN GLYCOSYLATED A1C: CPT

## 2025-06-18 PROCEDURE — 36415 COLL VENOUS BLD VENIPUNCTURE: CPT

## 2025-06-18 PROCEDURE — 85025 COMPLETE CBC W/AUTO DIFF WBC: CPT

## 2025-06-23 NOTE — PROGRESS NOTES
"Divina Delgadillo is a 35 year old female who is being evaluated via a billable telephone visit.      The patient has been notified of following:     \"This telephone visit will be conducted via a call between you and your physician/provider. We have found that certain health care needs can be provided without the need for a physical exam.  This service lets us provide the care you need with a short phone conversation.  If a prescription is necessary we can send it directly to your pharmacy.  If lab work is needed we can place an order for that and you can then stop by our lab to have the test done at a later time.    Telephone visits are billed at different rates depending on your insurance coverage. During this emergency period, for some insurers they may be billed the same as an in-person visit.  Please reach out to your insurance provider with any questions.    If during the course of the call the physician/provider feels a telephone visit is not appropriate, you will not be charged for this service.\"    Patient has given verbal consent for Telephone visit?  Yes    What phone number would you like to be contacted at? [phone]    How would you like to obtain your AVS? MyChart    Phone call duration: 22 minutes      Sleep Consultation:    Date on this visit: 11/17/2020    Divina Delgadillo is sent by Jaleesa Velasquez for a sleep consultation regarding insomnia.    Primary Physician: Jaleesa Velasquez     CC: \" I have a hard time falling sleep and staying sleep\"    HPI:  Divina Delgadillo is a 35 year old female with medical history remarkable for HTN, diabetes type 2, CKD stage 3, schizoaffective disorder bipolar type, PTSD, borderline personality disorder, adjustment disorder and obesity. She presents with 20+ year history of difficulty falling asleep and staying asleep.    She has tried Trazodone and found it ineffective.     Divina goes to sleep between 8:30 and 10:00 PM every night. She goes to bed " Joanna calling for clarification on medication spironolactone (ALDACTONE) 25 MG tablet. Patient reports he is not taking medication. Medication listed on discharge summary med list.     Patient reports he only takes potassium chloride (KLOR-CON) 20 MEQ packet once a day. Order home care has states twice a day. Please clarify.     Patient reports he is taking pramipexole (MIRAPEX) 0.125 MG tablet 1 tab 3x a day. Medication is not on med list for home care. Will need order for med.       Request for verbal orders for SN 1-4 visits as needed, then 2x a week for 3 weeks.  SW and PT to eval.    Ok to leave voicemail.   "tired, but cannot fall asleep. She states \"my body is tired but not my brain\". She wakes up between 7 and 8 AM without an alarm. She falls asleep in  minutes.  Divina has difficulty falling asleep.  She thinks in bed. She wakes up 2-3 times a night for 15 minutes before falling back to sleep.  Divina wakes up to uncertain reasons.   Patient gets  of \"7-8\" hours of sleep per night. She does not feel rested.     Patient does not use electronics in bed and watch TV in bed.     Divina does not do shift work.      Divina does snore every night and snoring is loud. Patient does not have a regular bed partner. There is not report of kicking and punching.  She does not have witnessed apneas. Patient sleeps on her side. She has frequent morning headaches, She reports rare symptoms of RLS. Divina denies any bruxism, sleep walking, dream enactment, sleep paralysis, cataplexy and hypnogogic/hypnopompic hallucinations. She does sleep talk.    Divina denies difficulty breathing through her nose, claustrophobia and reflux at night.      Divina has gained 0-5 pounds in the last year.  Patient describes themself as a night person.  She would prefer to go to sleep at 9:30 PM and wake up at 8:00 AM.  Patient's [place] Sleepiness score 3/24 inconsistent with excessive  daytime sleepiness.  She reports fatigue.     Divina naps 2 times per week for 60 minutes, feels refreshed after naps. She takes some inadvertant naps.  She does not drive. She uses 1-2 sodas/day. Last caffeine intake is usually around dinner time.    Allergies:    Allergies   Allergen Reactions     Acetaminophen Other (See Comments)     pt previously tried to overdose on it, PMD does not want pt taking per pt.      Latex Rash       Medications:    Current Outpatient Medications   Medication Sig Dispense Refill     amLODIPine (NORVASC) 5 MG tablet TAKE 2 TABLETS BY MOUTH EVERY DAY 60 tablet 5     ARIPiprazole (ABILIFY) 30 MG tablet Take 30 mg by mouth daily       " atorvastatin (LIPITOR) 10 MG tablet Take 1 tablet (10 mg) by mouth daily 90 tablet 1     BIOTIN FORTE 5 MG TABS TAKE ONE TABLET BY MOUTH DAILY FOR 2 MONTHS 30 tablet 1     blood glucose (ACCU-CHEK GUIDE) test strip Use to test blood sugar 3 times daily (accu chek GUIDE). 150 strip 1     blood glucose monitoring (ACCU-CHEK FASTCLIX) lancets Use to test blood sugar 3 times daily 102 each 3     carvedilol (COREG) 6.25 MG tablet Take 1 tablet (6.25 mg) by mouth 2 times daily 60 tablet 3     cloZAPine (CLOZARIL) 100 MG tablet Take 150 mg by mouth 2 times daily        FLUoxetine (PROZAC) 20 MG capsule Take 20 mg by mouth daily       insulin aspart (NOVOLOG PEN) 100 UNIT/ML pen Inject 1-5 Units Subcutaneous 3 times daily (with meals) Per sliding scale 9 mL 1     insulin glargine (BASAGLAR KWIKPEN) 100 UNIT/ML pen Inject 25 Units Subcutaneous daily 9 mL 2     lamoTRIgine (LAMICTAL) 100 MG tablet Take 100 mg by mouth At Bedtime       lisinopril (ZESTRIL) 5 MG tablet TAKE ONE TABLET BY MOUTH DAILY 30 tablet 3     loratadine (CLARITIN) 10 MG tablet TAKE ONE TABLET BY MOUTH EVERY MORNING 28 tablet 5     montelukast (SINGULAIR) 10 MG tablet Take 1 tablet (10 mg) by mouth At Bedtime 30 tablet 5     omeprazole (PRILOSEC) 20 MG DR capsule TAKE ONE  CAPSULE BY MOUTH DAILY 28 capsule 4     OZEMPIC, 1 MG/DOSE, 2 MG/1.5ML pen INJECT 1MG SUB-Q EVERY 7 DAYS (ON THURSDAYS) 3 mL 3     VENTOLIN  (90 Base) MCG/ACT inhaler inhale 2 puffs every 6 hours as needed for shortness of breath 18 g 3     zolpidem (AMBIEN) 5 MG tablet Take tablet by mouth 15 minutes prior to sleep, for Sleep Study 1 tablet 0       Problem List:  Patient Active Problem List    Diagnosis Date Noted     Asthma 05/26/2020     Priority: Medium     Suicidal behavior 05/26/2020     Priority: Medium     Obesity (BMI 30.0-34.9) 07/22/2019     Priority: Medium     Schizoaffective disorder, depressive type (H) 07/19/2019     Priority: Medium     Near syncope 03/22/2019      "Priority: Medium     Leukocytosis 03/22/2019     Priority: Medium     Acquired asplenia 03/22/2019     Priority: Medium     H/O leukocytosis 11/27/2018     Priority: Medium     Due to spleen removal       Nicotine abuse 08/13/2018     Priority: Medium     Nicotine dependence, cigarettes, uncomplicated 01/24/2018     Priority: Medium     Personal history of pancreatic cancer 01/24/2018     Priority: Medium     Uncontrolled type 2 diabetes mellitus with diabetic polyneuropathy, with long-term current use of insulin (H) 01/24/2018     Priority: Medium     Mild intermittent asthma with acute exacerbation 01/16/2018     Priority: Medium     Morbid obesity (H) 01/09/2018     Priority: Medium     IUD (intrauterine device) in place 07/26/2017     Priority: Medium     Mirena IUD inserted in office with premed 7/26/2017         Cervical high risk HPV (human papillomavirus) test positive 06/20/2017     Priority: Medium     6/15/2017 Pap:NIL, +HR HPV \"other\" (not 16/18). Plan to repeat Pap+HPV in 1 year  6/14/2018 Pap: NIL/neg HPV. Plan Pap+HPV in 3 years (2021)       DVT (deep venous thrombosis) (H) 02/27/2017     Priority: Medium     Malignant neoplasm of pancreas, unspecified location of malignancy (H) 02/27/2017     Priority: Medium     Type 2 diabetes mellitus with stage 3 chronic kidney disease, with long-term current use of insulin (H) 02/27/2017     Priority: Medium     Bipolar disorder (H) 02/27/2017     Priority: Medium     Orthostatic hypotension 01/10/2017     Priority: Medium     Tobacco abuse 01/10/2017     Priority: Medium     Long term (current) use of anticoagulants 01/09/2017     Priority: Medium     Overview:   Updated per 10/1/17 IMO import       Deep venous thrombosis of arm (H) 01/09/2017     Priority: Medium     Type 2 diabetes mellitus without complication (H) 02/01/2016     Priority: Medium     Stage 3 chronic kidney disease 12/03/2015     Priority: Medium     Overview:   Updated per 10/1/17 IMO " import       Neutrophilic leukocytosis 08/28/2015     Priority: Medium     Vitamin D insufficiency 06/01/2015     Priority: Medium     Mucinous neoplasm of pancreas 02/26/2015     Priority: Medium     Cardiac murmur 08/20/2013     Priority: Medium     Depressive disorder 09/15/2012     Priority: Medium     Hyperlipidemia LDL goal <100 10/31/2010     Priority: Medium     Borderline personality disorder (H) 11/06/2009     Priority: Medium     Overview:   IMO Update 10/11       Essential hypertension 06/13/2008     Priority: Medium     NAFL (nonalcoholic fatty liver) 04/11/2006     Priority: Medium     See flowsheet for hepatic panel.   Stopped zocor, was on godon as well had right upper quandrant ultrasound and ct  ? Psych med related vs [facility]       Esophageal reflux 04/14/2005     Priority: Medium     GERD       PTSD (post-traumatic stress disorder) 01/01/2001     Priority: Medium        Past Medical/Surgical History:  Past Medical History:   Diagnosis Date     Acute pain of left knee 3/22/2019     Acute-on-chronic kidney injury (H) 3/22/2019     ARF (acute renal failure) (H) 3/23/2019     Cervical high risk HPV (human papillomavirus) test positive 6/20/2017     Depressive disorder, not elsewhere classified      DVT (deep venous thrombosis) (H)      Dysmetabolic syndrome X      Esophageal reflux     GERD     Mild intermittent asthma      Other abnormal heart sounds     Heart murmur     Other specified types of schizophrenia, unspecified condition      Pancreatic cancer (H)     left adrenal gland, partial pancreas, and spleen removed; no chemo or radiation.      Type II or unspecified type diabetes mellitus without mention of complication, not stated as uncontrolled      Unspecified disorder of tympanic membrane      Past Surgical History:   Procedure Laterality Date     ADRENALECTOMY       IR LIVER BIOPSY PERCUTANEOUS  11/14/2019     PANCREATECTOMY PARTIAL       PERCUTANEOUS BIOPSY LIVER Right 11/14/2019    Procedure:  Percutaneous Liver Biopsy;  Surgeon: Sean Guzman PA-C;  Location: [facility] OR     SPLENECTOMY       SURGICAL HISTORY OF -   10/1/2000    Tympanoplasty     SURGICAL HISTORY OF -   10/1/2000    Tonsillectomy       Social History:  Social History     Socioeconomic History     Marital status: Single     Spouse name: Not on file     Number of children: 0     Years of education: Not on file     Highest education level: Not on file   Occupational History     Not on file   Social Needs     Financial resource strain: Not on file     Food insecurity     Worry: Not on file     Inability: Not on file     Transportation needs     Medical: Not on file     Non-medical: Not on file   Tobacco Use     Smoking status: Current Every Day Smoker     Packs/day: 1.00     Years: 4.00     Pack years: 4.00     Types: Cigarettes     Last attempt to quit: 2019     Years since quittin.3     Smokeless tobacco: Never Used     Tobacco comment: 15 cigarettes a day    Substance and Sexual Activity     Alcohol use: No     Drug use: No     Sexual activity: Not Currently     Partners: Female     Birth control/protection: I.U.D.     Comment: Both male and female    Lifestyle     Physical activity     Days per week: Not on file     Minutes per session: Not on file     Stress: Not on file   Relationships     Social connections     Talks on phone: Not on file     Gets together: Not on file     Attends [employer] service: Not on file     Active member of club or organization: Not on file     Attends meetings of clubs or organizations: Not on file     Relationship status: Not on file     Intimate partner violence     Fear of current or ex partner: Not on file     Emotionally abused: Not on file     Physically abused: Not on file     Forced sexual activity: Not on file   Other Topics Concern     Parent/sibling w/ CABG, MI or angioplasty before 65F 55M? No   Social History Narrative     Not on file       Family History:  Family History  "  Problem Relation Age of Onset     Respiratory Mother         ASTHMA     Allergies Mother      Depression Mother      Heart Disease Father         HEART VALVE REPLACEMENT     Hypertension Father      Diabetes Father      Depression Father      Respiratory Brother      Allergies Brother      Respiratory Sister      Allergies Sister      Depression Sister      Respiratory Sister      Allergies Sister      Depression Sister      Kidney Disease No family hx of        Review of Systems:  A complete review of systems reviewed by me is negative with the exeption of what has been mentioned in the history of present illness.      Physical Examination:  Vitals: Ht 1.6 m (5' 3\")   Wt 85.1 kg (187 lb 9.6 oz)   BMI 33.23 kg/m    BMI= Body mass index is 33.23 kg/m .         Thornton Total Score 11/17/2020   Total score - Thornton 3            RESP: Breathing is non-labored.   PSYCH: mentation appears normal and affect normal    Last Comprehensive Metabolic Panel:  Sodium   Date Value Ref Range Status   08/14/2020 139 133 - 144 mmol/L Final     Potassium   Date Value Ref Range Status   08/14/2020 4.3 3.4 - 5.3 mmol/L Final     Chloride   Date Value Ref Range Status   08/14/2020 110 (H) 94 - 109 mmol/L Final     Carbon Dioxide   Date Value Ref Range Status   08/14/2020 23 20 - 32 mmol/L Final     Anion Gap   Date Value Ref Range Status   08/14/2020 6 3 - 14 mmol/L Final     Glucose   Date Value Ref Range Status   08/14/2020 227 (H) 70 - 99 mg/dL Final     Urea Nitrogen   Date Value Ref Range Status   08/14/2020 26 7 - 30 mg/dL Final     Creatinine   Date Value Ref Range Status   08/14/2020 1.35 (H) 0.52 - 1.04 mg/dL Final     GFR Estimate   Date Value Ref Range Status   08/14/2020 51 (L) >60 mL/min/[1.73_m2] Final     Comment:     Non  GFR Calc  Starting 12/18/2018, serum creatinine based estimated GFR (eGFR) will be   calculated using the Chronic Kidney Disease Epidemiology Collaboration   (CKD-EPI) equation.   "     Calcium   Date Value Ref Range Status   08/14/2020 8.7 8.5 - 10.1 mg/dL Final     TSH   Date Value Ref Range Status   08/14/2020 0.61 0.40 - 4.00 mU/L Final       Impression/Plan:    Patient has features and risk factors for possible obstructive sleep apnea including: obesity, loud snoring, non-refreshing sleep, daytime fatigue, difficulty maintaining sleep and co-morbid HTN and DM type 2. The STOP-BANG score is 3/8. The pathophysiology, diagnosis and treatment of ROMERO was discussed. Recommend Polysomnogram (using 4% desaturation/Medicare/ AASM 1B scoring rules) to evaluate for obstructive sleep apnea.  Ambien if needed for the night of the sleep test.     Insomnia, sleep onset and sleep maintenance, likely due to a variety of factors including inadequate sleep hygiene. Co-occurring pyschiatric disorders identified and insomnia might be a secondary symptom. Untreated obstructive sleep apnea may be implicated in sleep maintenance difficulties. Patient is interested in CBT-I and referral placed.     Restless legs syndrome (RLS), Infrequent, does not warrant therapy at this time.    Literature provided regarding sleep apnea.      She will follow up with me in approximately two weeks after her sleep study has been competed to review the results and discuss plan of care.       Polysomnography reviewed.  Obstructive sleep apnea reviewed.  Complications of untreated sleep apnea were reviewed.    Scar Diaz PA-C    CC: Jaleesa Velasquez

## 2025-06-24 ENCOUNTER — RESULTS FOLLOW-UP (OUTPATIENT)
Dept: ENDOCRINOLOGY | Facility: CLINIC | Age: 40
End: 2025-06-24

## 2025-06-29 ENCOUNTER — MYC MEDICAL ADVICE (OUTPATIENT)
Dept: FAMILY MEDICINE | Facility: CLINIC | Age: 40
End: 2025-06-29
Payer: COMMERCIAL

## 2025-06-30 ENCOUNTER — OFFICE VISIT (OUTPATIENT)
Dept: BEHAVIORAL HEALTH | Facility: CLINIC | Age: 40
End: 2025-06-30
Payer: COMMERCIAL

## 2025-06-30 DIAGNOSIS — F25.1 SCHIZOAFFECTIVE DISORDER, DEPRESSIVE TYPE (H): Primary | ICD-10-CM

## 2025-06-30 PROCEDURE — 90791 PSYCH DIAGNOSTIC EVALUATION: CPT | Mod: 52

## 2025-06-30 NOTE — TELEPHONE ENCOUNTER
It looks like patient sent this MyChart regarding ongoing vertigo last night outside of business hours.   MyChart sent advising her to call for triage at this time.    Mer Arthur RN  [facility]

## 2025-06-30 NOTE — PROGRESS NOTES
[facility] Primary Care: Integrated Behavioral Health     Integrated Behavioral Health Services   Mental Health and Addiction Services     Brief Diagnostic Assessment        PATIENT'S NAME: Divina Delgadillo  MRN: [id number]     : 1985     DATE OF SERVICE: 2025  SERVICE LOCATION: Face to Face in Clinic   SERVICE MODALITY: In-person  Visit Start Time: 9:30 AM  Visit End Time:  10:10am   VISIT NUMBER: 2  [facility] Visit Activities: NEW and [facility] Only     Assessments completed:  The following assessments were completed by patient for this visit:  PHQ2:   Phq2 (    [employer],All Rights Reserved. Used With Permission. Developed By Jamilah Fuentes,Aide Carrasco,Melchor Davila And Colleagues,With An Educational Joce From [employer].)    2025 12:42 PM CST - Filed by Patient   The following questionnaire should only be answered by the patient. Are you the patient? Yes   Over the last 2 weeks, how often have you been bothered by any of the following problems?    Q1: Little interest or pleasure in doing things Not at all   Q2: Feeling down, depressed or hopeless Not at all   PHQ2 (    [employer],ALL RIGHTS RESERVED. USED WITH PERMISSION. DEVELOPED BY JAMILAH FUENTES,AIDE CARRASCO,MELCHOR DAVILA AND COLLEAGUES,WITH AN EDUCATIONAL JOCE FROM [employer].)    PHQ-2 Score (range: 0 - 6) 0       PHQ9:       2019    12:00 PM 2022     1:13 PM 2023     2:19 PM 2023    12:42 PM 2023     3:10 PM 2023     9:54 AM 2025    10:06 AM   PHQ-9 SCORE   PHQ-9 Total Score MyChart  4 (Minimal depression)  11 (Moderate depression) 2 (Minimal depression) 9 (Mild depression) 13 (Moderate depression)   PHQ-9 Total Score 12  4 11 11 1 9 13        Patient-reported    Data saved with a previous flowsheet row definition     GAD7:       2017     8:00 AM 2017    11:00 AM 2018     4:00 PM 2018     3:00 PM 2019    12:00 PM  1/2/2023     2:19 PM   NELSON-7 SCORE   Total Score 0  7 2 7 10 4       Data saved with a previous flowsheet row definition     CAGE-AID:       6/30/2025     9:18 AM   CAGE-AID Total Score   Total Score 0    Total Score MyChart 0 (A total score of 2 or greater is considered clinically significant)       Patient-reported     PROMIS 10-Global Health (only subscores and total score):       6/12/2025    10:07 AM   PROMIS-10 Scores Only   Global Mental Health Score 8    Global Physical Health Score 10    PROMIS TOTAL - SUBSCORES 18        Patient-reported     [place] Suicide Severity Rating Scale (Lifetime/Recent)      1/10/2025    12:22 PM 2/2/2025     9:48 PM 2/23/2025     5:13 PM 4/20/2025     6:36 PM 4/29/2025     7:09 PM 5/30/2025     2:18 PM 6/12/2025    11:19 AM   [place] Suicide Severity Rating (Lifetime/Recent)   Q1 Wished to be Dead (Past Month) 0-->no 0-->no 0-->no 1-->yes 1-->yes 0-->no    Q2 Suicidal Thoughts (Past Month) 0-->no 0-->no 0-->no 0-->no 1-->yes 0-->no    Q3 Suicidal Thought Method     1-->yes     Q4 Suicidal Intent without Specific Plan     0-->no     Q5 Suicide Intent with Specific Plan     0-->no     Q6 Suicide Behavior (Lifetime) 0-->no 0-->no 0-->no 1-->yes 1-->yes 0-->no    If yes to Q6, within past 3 months?    0-->no 0-->no     Level of Risk per Screen no risks indicated  no risks indicated  no risks indicated  moderate risk  moderate risk  no risks indicated     1. Wish to be Dead (Lifetime)       Y   1. Wish to be Dead (Past 1 Month)       N   2. Non-Specific Active Suicidal Thoughts (Lifetime)       Y   2. Non-Specific Active Suicidal Thoughts (Past 1 Month)       Y   3. Active Suicidal Ideation with any Methods (Not Plan) Without Intent to Act (Lifetime)       Y   3. Active Suicidal Ideation with any Methods (Not Plan) Without Intent to Act (Past 1 Month)       Y   4. Active Suicidal Ideation with Some Intent to Act, Without Specific Plan (Lifetime)       Y   4. Active Suicidal  Ideation with Some Intent to Act, Without Specific Plan (Past 1 Month)       N   5. Active Suicidal Ideation with Specific Plan and Intent (Lifetime)       Y   5. Active Suicidal Ideation with Specific Plan and Intent (Past 1 Month)       N   Most Severe Ideation Rating (Lifetime)       4   Most Severe Ideation Rating (Past 1 Month)       3   Frequency (Lifetime)       4   Frequency (Past 1 Month)       4   Duration (Lifetime)       2   Duration (Past 1 Month)       2   Controllability (Lifetime)       3   Controllability (Past 1 Month)       3   Deterrents (Lifetime)       2   Deterrents (Past 1 Month)       2   Reasons for Ideation (Lifetime)       3   Reasons for Ideation (Past 1 Month)       5   Actual Attempt (Lifetime)       Y   Total Number of Actual Attempts (Lifetime)       2   Actual Attempt (Past 3 Months)       N   Has subject engaged in non-suicidal self-injurious behavior? (Lifetime)       Y   Has subject engaged in non-suicidal self-injurious behavior? (Past 3 Months)       N   Interrupted Attempts (Lifetime)       Y   Total Number of Interrupted Attempts (Lifetime)       1   Interrupted Attempts (Past 3 Months)       N   Aborted or Self-Interrupted Attempt (Lifetime)       Y   Total Number of Aborted or Self-Interrupted Attempts (Lifetime)       --   Aborted or Self-Interrupted Attempt (Past 3 Months)       N   Preparatory Acts or Behavior (Lifetime)       N   Most Recent Attempt Date       --   Actual Lethality/Medical Damage Code (Most Recent Attempt)       0   Potential Lethality Code (Most Recent Attempt)       1   Most Lethal Attempt Date       --   Actual Lethality/Medical Damage Code (Most Lethal Attempt)       1   Potential Lethality Code (Most Lethal Attempt)       1   Initial/First Attempt Date       --   Actual Lethality/Medical Damage Code (Initial/First Attempt)       1   Potential Lethality Code (Initial/First Attempt)       1   Calculated C-SSRS Risk Score (Lifetime/Recent)        Moderate Risk       Data saved with a previous flowsheet row definition      Identifying Information:   Patient is a 40 year old female, , has a partner or significant other adult.  Patient attended the session alone.       Referral:   Who referred you to care: .    Reason for referral: determine behavioral health treatment options and assess client readiness and motivation to change.      Patient's Statement of Presenting Concern:   Patient reports the following reason(s) for seeking an assessment at this time: therapy.  Patient stated that symptoms have resulted in the following functional impairments: chronic disease management, health maintenance, management of the household and or completion of tasks, money management, relationship(s), self-care, social interactions, and work / vocational responsibilities     History of Presenting Concern:   Patient reports that these problem(s) began  several years ago, looking for ongoing support. Patient has attempted to resolve these concerns in the past through: [facility], case management, counseling, and psychiatry. Patient reports that other professional(s) are involved in providing support / services.      Social/Family History:   The patient describes their cultural background as .    Cultural influences and impact on patient's life structure, values, norms, and healthcare: none.      Contextual influences on patient's health include: Individual Factors limited work hours, relationship struggles, chronic mental health .      Cultural, Contextual, and socioeconomic factors do affect the patient's access to services.  These factors will be addressed in the Preliminary Treatment plan.    Patient identified their preferred language to be English. Patient reported they do not  need the assistance of an  or other support involved in therapy.      Current living situation: staying in own home/apartment.        Socioeconomic status and  needs: does not have financial concerns.     Patient's current significant relationships include: partner; mother; siblings; pets; friends; .    Patient's sexual orientation is bi-sexual,     Patient reported having   0 children.      Patient identified few stable and meaningful social connections.      Patient identified the following strengths or resources that will help her     Highest education level was high school graduate.      Patient is currently employed part time.     Patient reported that they have not been involved with the legal system.     Medical History:  Family History of Mental Health: No    Current Medical Health Concerns: None reported    Current Medication:    Patient reports current meds as:   Current Outpatient Medications   Medication Sig Dispense Refill    acetaminophen (TYLENOL) 500 MG tablet Take 1-2 tablets (500-1,000 mg) by mouth every 6 hours as needed for mild pain. 40 tablet 0    albuterol (VENTOLIN HFA) 108 (90 Base) MCG/ACT inhaler INHALE 2 PUFFS INTO THE LUNGS EVERY SIX  HOURS AS NEEDED FOR SHORTNESS OF BREATH 18 g 5    amitriptyline (ELAVIL) 10 MG tablet Take 1 tablet (10 mg) by mouth at bedtime. 90 tablet 1    ARIPiprazole (ABILIFY) 15 MG tablet Take 1 Tablet (15 mg) by mouth once daily in the evening.      atorvastatin (LIPITOR) 20 MG tablet Take 1 tablet (20 mg) by mouth daily 90 tablet 0    biotin (BIOTIN MAXIMUM STRENGTH) 5 MG CAPS Take 1 tablet by mouth daily 100 capsule 0    blood glucose (ACCU-CHEK GUIDE) test strip Use to test blood sugar 3 times daily or as directed. 100 strip 11    bumetanide (BUMEX) 0.5 MG tablet Take 1 tablet (0.5 mg) by mouth daily 90 tablet 3    carvedilol (COREG) 12.5 MG tablet Take 1 tablet (12.5 mg) by mouth 2 times daily 60 tablet 11    cloZAPine (CLOZARIL) 50 MG tablet Take 100 mg by mouth at bedtime.      docusate sodium (COLACE) 100 MG capsule Take 1 capsule (100 mg) by mouth 2 times daily as needed for constipation. 60 capsule 5  "   FLUoxetine (PROZAC) 20 MG capsule Take 3 Capsules (60 mg) by mouth once daily in the morning.      glucose (BD GLUCOSE) 4 g chewable tablet Take 1 tablet by mouth every hour as needed for low blood sugar 30 tablet 4    HYDROcodone-acetaminophen (NORCO) 5-325 MG tablet Take 1-2 tablets by mouth every 6 hours as needed for severe pain. 10 tablet 0    hydrOXYzine HCl (ATARAX) 25 MG tablet Take 25 mg by mouth 3 times daily as needed for anxiety.      insulin aspart (NOVOLOG VIAL) 100 UNITS/ML vial for use with continuous insulin pump Max TDD 80 20 mL 14    Insulin Infusion Pump (MINIMED 780G INSULIN PUMP) KIT 1 each daily SmartGuard Target: 100; Active Insulin Time: 2 hours (recommended); SmartGuardTM Warmup/Manual Mode: Suspend before low (recommended) 1 kit 0    Insulin Infusion Pump Supplies (EXTENDED INFUSION SET 23\"/6MM) MISC 1 each every 7 days Max Total Daily Dose 70 units; Change infusion set & reservoir every 7 days (recommended) 10 each 6    Insulin Infusion Pump Supplies (EXTENDED RESERVOIR 3ML) MISC 1 each every 7 days 10 each 11    lamoTRIgine (LAMICTAL) 100 MG tablet Take 100 mg by mouth at bedtime.      levonorgestrel (MIRENA) 52 MG (20 mcg/day) IUD 1 each by Intrauterine route once.      loratadine (CLARITIN) 10 MG tablet Take 1 tablet (10 mg) by mouth every morning 30 tablet 11    LORazepam (ATIVAN) 0.5 MG tablet Take 1 tablet (0.5 mg) by mouth every 8 hours as needed (Ssevere vertigo). 12 tablet 0    metoclopramide (REGLAN) 10 MG tablet Take 1 tablet (10 mg) by mouth 4 times daily as needed (for nausea, dizziness or vertigo). 24 tablet 1    omeprazole (PRILOSEC) 20 MG DR capsule Take 1 capsule (20 mg) by mouth 2 times daily. 30 minutes before meal on empty stomach. 90 capsule 2    QUEtiapine (SEROQUEL) 25 MG tablet  (Patient taking differently: Take 25-50 mg by mouth 3 times daily as needed.)      Suvorexant (BELSOMRA) 10 MG tablet Take 10 mg by mouth at bedtime.      topiramate (TOPAMAX) 25 MG " tablet Take 1 tablet (25 mg) by mouth 2 times daily. 60 tablet 2     Current Facility-Administered Medications   Medication Dose Route Frequency Provider Last Rate Last Admin    levonorgestrel (MIRENA) 52 MG (20 mcg/day) IUD 1 each  1 each Intrauterine See Admin Instructions Irina Camejo MD   1 each at 04/07/25 8323      Medication Adherence:   Patient reports taking/not taking prescription medications as prescribed: taking.     Substance Use:  No current substance use.    Based on the negative CAGE score and clinical interview there  are not indications of drug or alcohol abuse.     Significant Losses / Trauma / Abuse / Neglect Issues:   There are no indications or report of: significant losses, trauma, abuse or neglect.   Issues of possible neglect are not present.     Mental Status Assessment:   Appearance:   Appropriate    Eye Contact:   Good    Psychomotor Behavior: Normal    Attitude:   Cooperative    Orientation:   All   Speech Rate / Production: Normal    Volume:   Normal    Mood:    Normal   Affect:    Appropriate    Thought Content:  Clear    Thought Form:  Coherent  Logical    Insight:    Good      Safety Assessment:   Patient has had a history of SI and SA: see [place]   Patient denies current or recent suicidal ideation or behaviors.   Patient denies current or recent homicidal ideation or behaviors.   Patient denies current or recent self injurious behavior or ideation.   Patient denies other safety concerns.   Patient reports there are/are not firearms in the house  are not;  no firearms in the house   Protective Factors safe and stable environment; regular sleep; other; medications.      Risk Factors Current high stress, Implulsivity, and Intense emotionality    Plan for Safety and Risk Management:   Safety and Risk: A safety and risk management plan has been developed including: Patient consented to co-developed safety plan.  Safety and risk management plan was completed - see below.   Patient agreed to use safety plan should any safety concerns arise.  A copy was given to the patient..          Report to child / adult protection services was NA.     Diagnostic Criteria:   Schizoaffective Disorder Depressive Type - This subtype applies if only a major depressive episodes are part of the presentation. , A.  An uninterrupted period of illness during which there is a major mood episode (major depressive or manic) concurrent with Criteria A of schizophrenia. The major depressive episode must include Criteria A1: Depressed mood., B.  Delusions or hallucinations for 2 or more week in the absence of a major mood episode (depressive or manic) during the lifetime duration of the illness., C.  Symptoms that meet criteria for a major mood episode are present for the majority of the total duration of the active and residual portions of the illness. , and D.  The disturbance is not attributable to the effects of a substance or another medical condition.     DSM5 Diagnoses:      Diagnoses: 295.70  (F25.1) Schizoaffective Disorder Depressive Type   Psychosocial / Contextual Factors: Relationship Concerns, Interpersonal Concerns, and Limited Social Support       Preliminary Treatment Plan:     It is expected that patient will need less than 10 psychotherapy sessions in the next 12 months, as they have received less than 10 in the previous year.     Chemical dependency recommendations: No indications of CD issues     As a preliminary treatment goal, patient will experience a reduction in depressed mood, will develop more effective coping skills to manage depressive symptoms, will develop healthy cognitive patterns and beliefs, will increase ability to function adaptively, and will continue to take medications as prescribed / participate in supportive activities and services  and will address relationship difficulties in a more adaptive manner.     The patient is receiving treatment / structured support from the  following professional(s) / service and treatment. Collaboration will be initiated with: case management.     The following referral(s) will be initiated: long term therapy.     A Release of Information is not needed at this time.             Eloisa Saul [facility]   June 30, 2025

## 2025-07-01 ENCOUNTER — OFFICE VISIT (OUTPATIENT)
Dept: FAMILY MEDICINE | Facility: CLINIC | Age: 40
End: 2025-07-01
Payer: COMMERCIAL

## 2025-07-01 ENCOUNTER — RESULTS FOLLOW-UP (OUTPATIENT)
Dept: FAMILY MEDICINE | Facility: CLINIC | Age: 40
End: 2025-07-01

## 2025-07-01 ENCOUNTER — ORDERS ONLY (AUTO-RELEASED) (OUTPATIENT)
Dept: FAMILY MEDICINE | Facility: CLINIC | Age: 40
End: 2025-07-01

## 2025-07-01 VITALS
RESPIRATION RATE: 16 BRPM | HEIGHT: 65 IN | BODY MASS INDEX: 38.6 KG/M2 | OXYGEN SATURATION: 91 % | DIASTOLIC BLOOD PRESSURE: 66 MMHG | TEMPERATURE: 99.1 F | HEART RATE: 75 BPM | WEIGHT: 231.7 LBS | SYSTOLIC BLOOD PRESSURE: 132 MMHG

## 2025-07-01 DIAGNOSIS — R42 DIZZINESS: ICD-10-CM

## 2025-07-01 DIAGNOSIS — N18.32 TYPE 2 DIABETES MELLITUS WITH STAGE 3B CHRONIC KIDNEY DISEASE, WITH LONG-TERM CURRENT USE OF INSULIN (H): ICD-10-CM

## 2025-07-01 DIAGNOSIS — R55 SYNCOPE, UNSPECIFIED SYNCOPE TYPE: ICD-10-CM

## 2025-07-01 DIAGNOSIS — E11.22 TYPE 2 DIABETES MELLITUS WITH STAGE 3B CHRONIC KIDNEY DISEASE, WITH LONG-TERM CURRENT USE OF INSULIN (H): ICD-10-CM

## 2025-07-01 DIAGNOSIS — R55 SYNCOPE, UNSPECIFIED SYNCOPE TYPE: Primary | ICD-10-CM

## 2025-07-01 DIAGNOSIS — N18.31 STAGE 3A CHRONIC KIDNEY DISEASE (H): Primary | ICD-10-CM

## 2025-07-01 DIAGNOSIS — Z87.81 FREQUENT FRACTURES OF BONE: ICD-10-CM

## 2025-07-01 DIAGNOSIS — Z79.4 TYPE 2 DIABETES MELLITUS WITH STAGE 3B CHRONIC KIDNEY DISEASE, WITH LONG-TERM CURRENT USE OF INSULIN (H): ICD-10-CM

## 2025-07-01 LAB
ALBUMIN SERPL BCG-MCNC: 3.9 G/DL (ref 3.5–5.2)
ALP SERPL-CCNC: 196 U/L (ref 40–150)
ALT SERPL W P-5'-P-CCNC: 24 U/L (ref 0–50)
ANION GAP SERPL CALCULATED.3IONS-SCNC: 12 MMOL/L (ref 7–15)
AST SERPL W P-5'-P-CCNC: 20 U/L (ref 0–45)
BILIRUB SERPL-MCNC: 0.2 MG/DL
BUN SERPL-MCNC: 34.1 MG/DL (ref 6–20)
CALCIUM SERPL-MCNC: 9.5 MG/DL (ref 8.8–10.4)
CHLORIDE SERPL-SCNC: 106 MMOL/L (ref 98–107)
CREAT SERPL-MCNC: 1.77 MG/DL (ref 0.51–0.95)
EGFRCR SERPLBLD CKD-EPI 2021: 37 ML/MIN/1.73M2
GLUCOSE SERPL-MCNC: 76 MG/DL (ref 70–99)
HCO3 SERPL-SCNC: 24 MMOL/L (ref 22–29)
POTASSIUM SERPL-SCNC: 4.6 MMOL/L (ref 3.4–5.3)
PROT SERPL-MCNC: 7.9 G/DL (ref 6.4–8.3)
SODIUM SERPL-SCNC: 142 MMOL/L (ref 135–145)
TSH SERPL DL<=0.005 MIU/L-ACNC: 1.25 UIU/ML (ref 0.3–4.2)

## 2025-07-01 PROCEDURE — 93000 ELECTROCARDIOGRAM COMPLETE: CPT | Performed by: STUDENT IN AN ORGANIZED HEALTH CARE EDUCATION/TRAINING PROGRAM

## 2025-07-01 PROCEDURE — 84443 ASSAY THYROID STIM HORMONE: CPT | Performed by: STUDENT IN AN ORGANIZED HEALTH CARE EDUCATION/TRAINING PROGRAM

## 2025-07-01 PROCEDURE — 1126F AMNT PAIN NOTED NONE PRSNT: CPT | Performed by: STUDENT IN AN ORGANIZED HEALTH CARE EDUCATION/TRAINING PROGRAM

## 2025-07-01 PROCEDURE — 80053 COMPREHEN METABOLIC PANEL: CPT | Performed by: STUDENT IN AN ORGANIZED HEALTH CARE EDUCATION/TRAINING PROGRAM

## 2025-07-01 PROCEDURE — 99214 OFFICE O/P EST MOD 30 MIN: CPT | Performed by: STUDENT IN AN ORGANIZED HEALTH CARE EDUCATION/TRAINING PROGRAM

## 2025-07-01 PROCEDURE — G2211 COMPLEX E/M VISIT ADD ON: HCPCS | Performed by: STUDENT IN AN ORGANIZED HEALTH CARE EDUCATION/TRAINING PROGRAM

## 2025-07-01 PROCEDURE — 36415 COLL VENOUS BLD VENIPUNCTURE: CPT | Performed by: STUDENT IN AN ORGANIZED HEALTH CARE EDUCATION/TRAINING PROGRAM

## 2025-07-01 PROCEDURE — 3078F DIAST BP <80 MM HG: CPT | Performed by: STUDENT IN AN ORGANIZED HEALTH CARE EDUCATION/TRAINING PROGRAM

## 2025-07-01 PROCEDURE — 3075F SYST BP GE 130 - 139MM HG: CPT | Performed by: STUDENT IN AN ORGANIZED HEALTH CARE EDUCATION/TRAINING PROGRAM

## 2025-07-01 RX ORDER — LORAZEPAM 0.5 MG/1
0.5 TABLET ORAL EVERY 8 HOURS PRN
Qty: 12 TABLET | Refills: 0 | Status: CANCELLED | OUTPATIENT
Start: 2025-07-01

## 2025-07-01 ASSESSMENT — PAIN SCALES - GENERAL: PAINLEVEL_OUTOF10: NO PAIN (0)

## 2025-07-01 ASSESSMENT — ASTHMA QUESTIONNAIRES: ACT_TOTALSCORE: 25

## 2025-07-01 NOTE — RESULT ENCOUNTER NOTE
Results discussed directly with patient while patient was present. Any further details documented in the note.   APRIL BETANCUR MD

## 2025-07-01 NOTE — PROGRESS NOTES
Assessment & Plan     Syncope, unspecified syncope type  > given history lower suspicion for epilepsy, carotid sinus syncope, vasovagal, subclavian steal syndrome, it's possible her symptoms could be due to vertigo, but that wouldn't explain why she was out for 20 minutes, another etiology could include arhythmia/cardiac syncope, POTS, patient denies any head injury and physical exam findings were negative for neurological deficit   > EKG 12-lead complete w/read - Clinics: artifact noted, but not significnantly changed compared to past EKG on file  - orthostats in clinic were within normal limits   - Spo2 was 91%, which is not significantly changed from when she was in the ER on 5/30/25 where was 92% on RA   - patient did have a 20mmHg difference in blood pressure between her left and right arm (but this difference was almost an hour after her blood pressure was first checked)   - CT Head w/o Contrast; Future  - TSH with free T4 reflex; Future  - Echocardiogram Complete; Future  - ZIO PATCH MAIL OUT; Future    Dizziness  > during this encounter the patient did experience dizziness and had noticeable horizontal nystagmus when she was following my finger looking towards the right that was not as present when she was looking to the left or upwards   - she has an upcoming appointment with ENT next month, admits she hasn't been seeing physical therapy for vestibular therapy due to transportation issues  - Comprehensive metabolic panel (BMP + Alb, Alk Phos, ALT, AST, Total. Bili, TP); Future  - Primary Care - Care Coordination Referral; Future, patient admits she doesn't have reliable transportation which is why she hasn't been able to see physical therapy for her dizziness, would appreciate recommendations regarding community resources for transportation to medical appointments   - ZIO PATCH MAIL OUT; Future    Type 2 diabetes mellitus with stage 3b chronic kidney disease, with long-term current use of insulin (H)  -  "TSH with free T4 reflex; Future    Follow-up plan:   - if above workup is negative, could consider carotid ultrasound vs CTA given difference in blood pressure of the RUE vs LUE but will await other workup first       BMI  Estimated body mass index is 38.14 kg/m  as calculated from the following:    Height as of this encounter: 1.66 m (5' 5.35\").    Weight as of this encounter: 105.1 kg (231 lb 11.2 oz).       Rhiannon Murcia is a 40 year old, presenting for the following health issues:  Vertigo        7/1/2025     1:57 PM   Additional Questions   Roomed by Bianca GONZALEZ LPN   Accompanied by self         7/1/2025     1:57 PM   Patient Reported Additional Medications   Patient reports taking the following new medications no new meds     History of Present Illness       Reason for visit:  Vertigo   She is taking medications regularly.        Dizziness  Onset/Duration: ongoing for 5 years   Description:   Do you feel faint: YES  Does it feel like the surroundings (bed, room) are moving: no, it was like she didn't know where her feet were   Unsteady/off balance: YES  Have you passed out or fallen: YES- most recent time was yesterday in her apartment. She lives alone. Her emotional support cat licked her face until she became consciousness again. Was out for about 20 minutes or so. She fell on her couch and had a relatively soft landing. Has passed out about 4 times in the past 5 years. Other times she can stave it off by deep breathing until it passes. She will feel ok while sitting down and then when she stand she will feel the dizziness.   Intensity: moderate, severe  Progression of Symptoms: worsening  Accompanying Signs & Symptoms:  Heart palpitations or chest pain: No  Nausea, vomiting: No  Weakness or lack of coordination in arms or legs: YES  Vision or speech changes: No  Numbness or tingling: YES- in both arms like she can't control them. Like dead weight \"it's not like they were numb, I just didn't know where " "they were, same with my feet and legs. I don't know I just didn't know where they were\"    Ringing in ears (Tinnitus): No  Hearing Loss: No  History:   Head trauma/concussion history: No  Previous similar symptoms: YES  Recent bleeding history: No  Any new medications (BP?): No  Precipitating factors:   Worse with activity: YES  Worse with head movement: No  Alleviating factors:   Does staying in a fixed position give relief: YES  Therapies tried and outcome: staying still and deep breathing helps at times but not every time. Had  been on medications in the past for it but they didn't help in the moment when it was happening. She takes the reglan and the lorazepam at the same time and isn't sure if either are working.     She was standing and walking over to her couch to watch TV and her vertigo started, \"It got really bad, I looked down and then passed out\"   Reports she landed on the couch, prior to syncope she reports \"I didn't know where my legs were\"   She denies any head trauma, but the fall was unwitnessed, states she woke up on the ground next to her couch   States she was \"out for about 20 min\" she states she knows this because she just looked at her phone befopre she was going to watch TV and it was around 15;00 and when she came to she looked at her clock again and it was almost 15:30   She reports after coming to she didn't experience post syncopal confusion, she was able to recgonize she passed out and thinks it was related to her vertigo   previous episodes: \"not this severe\" but admits a couple of other times in the past she \"got lightheaded enough to faint for a minute\" this last happened \"a couple of weeks ago\" fainting prior to dizziness has occurred at least twice in the past 2 months   history of heart disease: denies   No recent medication changes  Family history: no known family history of passing out or vertigo   Patient reports \"I blacked out\" stating that her event was a true transient loss " "of consciousness.  Head movements, pressure on neck, or shaving before TLOC: denies so lower suspicion for carotid sinus syncope  Palpitations before TLOC: reports yes she had some palpitations prior to her episode of syncope, this may suggest cardiac syncope or POTS (tilt table testing)   Flashing lights before TLOC: denies, lower suspicion for epilepsy  Appearing to be clear-headed immediately after TLOC may suggest syncope  Impaired memory for many minutes after TLOC: denies lower suspicion for epilepsy  Urinary incontinence during TLOC: denies another sign making epilepsy less likely     Denies any chest pain, shortness of breath, LIMON, or current palpitations     Review of Systems   Constitutional:  Negative for chills and fever.   HENT:  Negative for ear pain.    Eyes:  Negative for pain.   Respiratory:  Negative for cough.    Cardiovascular:  Negative for chest pain.   Gastrointestinal:  Negative for abdominal pain.   Genitourinary:  Negative for dysuria.   Musculoskeletal:  Negative for neck pain.   Skin:  Negative for rash.   Neurological:  Negative for headaches.           Objective    /66 (BP Location: Right arm, Patient Position: Sitting, Cuff Size: Adult Regular)   Pulse 75   Temp 99.1  F (37.3  C) (Tympanic)   Resp 16   Ht 1.66 m (5' 5.35\")   Wt 105.1 kg (231 lb 11.2 oz)   LMP  (LMP Unknown)   SpO2 (!) 91%   BMI 38.14 kg/m    Body mass index is 38.14 kg/m .  Physical Exam  Constitutional:       General: She is not in acute distress.     Appearance: Normal appearance. She is not ill-appearing, toxic-appearing or diaphoretic.   HENT:      Head: Normocephalic and atraumatic.      Comments: No obvious signs of lacerations deformities bumps or trauma to the head     Right Ear: Ear canal and external ear normal. There is no impacted cerumen.      Left Ear: Ear canal and external ear normal. There is no impacted cerumen.      Ears:      Comments: No hemotympanum appreciated bilaterally     Nose: " No congestion or rhinorrhea.   Eyes:      General: No scleral icterus.        Right eye: No discharge.         Left eye: No discharge.      Extraocular Movements: Extraocular movements intact.      Conjunctiva/sclera: Conjunctivae normal.      Comments: Horizontal nystagmus noted when the patient was looking towards the right when she was experiencing vertigo the vertigo lasted a couple seconds in clinic and the nystagmus appeared to have improved   Cardiovascular:      Rate and Rhythm: Normal rate and regular rhythm.      Heart sounds: Normal heart sounds. No murmur heard.     No friction rub. No gallop.   Pulmonary:      Effort: Pulmonary effort is normal. No respiratory distress.      Breath sounds: Normal breath sounds. No stridor. No wheezing, rhonchi or rales.   Musculoskeletal:         General: No deformity. Normal range of motion.      Cervical back: Normal range of motion and neck supple. No rigidity or tenderness.   Lymphadenopathy:      Cervical: No cervical adenopathy.   Skin:     Findings: No rash.   Neurological:      General: No focal deficit present.      Mental Status: She is alert and oriented to person, place, and time.      Coordination: Coordination normal.      Gait: Gait normal.      Deep Tendon Reflexes: Reflexes normal.   Psychiatric:      Comments: Anxious appearing              Signed Electronically by: APRIL BETANCUR MD

## 2025-07-02 ENCOUNTER — PATIENT OUTREACH (OUTPATIENT)
Dept: CARE COORDINATION | Facility: CLINIC | Age: 40
End: 2025-07-02

## 2025-07-02 NOTE — RESULT ENCOUNTER NOTE
Fito Delgadillo,     Your thyroid levels are within normal limits.    Based on the results of your CMP, your kidney function appears to be slightly improved compared to 3 months ago.  However, it is still important that you do your best to get at least 64 ounces of water intake daily while avoiding kidney toxic medications like NSAIDs (Aleve, Advil, ibuprofen, naproxen etc.)     Your ALP is still elevated, we can await the results of your CT scan for further information.      Sincerely,     Chyna Dawson MD

## 2025-07-02 NOTE — PROGRESS NOTES
"Clinic Care Coordination Contact  Community Health Worker Initial Outreach            Patient accepts CC: No, patient stated she \"figured out\" transportation to medical appts. CHW offered insurance medical ride number as well, and patient stated \"that is what she figured out\". Patient has legal guardian and Care Coordinator through [facility] as well. No other needs. Patient will be sent Care Coordination introduction letter for future reference.     FRANKLIN Tejeda  [phone]  [facility]    "

## 2025-07-02 NOTE — LETTER
Dear Divina,    I am a clinic community health worker who works with VERONIQUE Spears CNP with the [facility]. I wanted to thank you for spending the time to talk with me.  Below is a description of clinic care coordination and how I can further assist you.       The clinic care coordination team is made up of a registered nurse, , financial resource worker and community health worker who understand the health care system. The goal of clinic care coordination is to help you manage your health and improve access to the health care system. Our team works alongside your provider to assist you in determining your health and social needs. We can help you obtain health care and community resources, providing you with necessary information and education. We can work with you through any barriers and develop a care plan that helps coordinate and strengthen the communication between you and your care team.  Our services are voluntary and are offered without charge to you personally.    Please feel free to contact me with any questions or concerns regarding care coordination and what we can offer.      We are focused on providing you with the highest-quality healthcare experience possible.    Sincerely,     FRANKLIN Tejeda  [phone]  [facility]

## 2025-07-12 ENCOUNTER — APPOINTMENT (OUTPATIENT)
Dept: GENERAL RADIOLOGY | Facility: CLINIC | Age: 40
End: 2025-07-12
Attending: FAMILY MEDICINE
Payer: COMMERCIAL

## 2025-07-12 ENCOUNTER — HOSPITAL ENCOUNTER (EMERGENCY)
Facility: CLINIC | Age: 40
Discharge: ANOTHER HOSPITAL | End: 2025-07-13
Attending: FAMILY MEDICINE
Payer: COMMERCIAL

## 2025-07-12 DIAGNOSIS — S82.64XA CLOSED NONDISPLACED FRACTURE OF LATERAL MALLEOLUS OF RIGHT FIBULA, INITIAL ENCOUNTER: ICD-10-CM

## 2025-07-12 DIAGNOSIS — S82.831A OTHER CLOSED FRACTURE OF PROXIMAL END OF RIGHT FIBULA, INITIAL ENCOUNTER: ICD-10-CM

## 2025-07-12 DIAGNOSIS — S82.241A CLOSED DISPLACED SPIRAL FRACTURE OF SHAFT OF RIGHT TIBIA, INITIAL ENCOUNTER: ICD-10-CM

## 2025-07-12 LAB
ANION GAP SERPL CALCULATED.3IONS-SCNC: 12 MMOL/L (ref 7–15)
BASOPHILS # BLD AUTO: 0.1 10E3/UL (ref 0–0.2)
BASOPHILS NFR BLD AUTO: 1 %
BUN SERPL-MCNC: 31 MG/DL (ref 6–20)
CALCIUM SERPL-MCNC: 9.3 MG/DL (ref 8.8–10.4)
CHLORIDE SERPL-SCNC: 104 MMOL/L (ref 98–107)
CREAT SERPL-MCNC: 1.86 MG/DL (ref 0.51–0.95)
EGFRCR SERPLBLD CKD-EPI 2021: 35 ML/MIN/1.73M2
EOSINOPHIL # BLD AUTO: 0.4 10E3/UL (ref 0–0.7)
EOSINOPHIL NFR BLD AUTO: 3 %
ERYTHROCYTE [DISTWIDTH] IN BLOOD BY AUTOMATED COUNT: 16.8 % (ref 10–15)
GLUCOSE BLDC GLUCOMTR-MCNC: 119 MG/DL (ref 70–99)
GLUCOSE BLDC GLUCOMTR-MCNC: 66 MG/DL (ref 70–99)
GLUCOSE SERPL-MCNC: 83 MG/DL (ref 70–99)
HCO3 SERPL-SCNC: 25 MMOL/L (ref 22–29)
HCT VFR BLD AUTO: 36.1 % (ref 35–47)
HGB BLD-MCNC: 10.8 G/DL (ref 11.7–15.7)
IMM GRANULOCYTES # BLD: 0.1 10E3/UL
IMM GRANULOCYTES NFR BLD: 1 %
LYMPHOCYTES # BLD AUTO: 4.6 10E3/UL (ref 0.8–5.3)
LYMPHOCYTES NFR BLD AUTO: 31 %
MCH RBC QN AUTO: 26.4 PG (ref 26.5–33)
MCHC RBC AUTO-ENTMCNC: 29.9 G/DL (ref 31.5–36.5)
MCV RBC AUTO: 88 FL (ref 78–100)
MONOCYTES # BLD AUTO: 1.1 10E3/UL (ref 0–1.3)
MONOCYTES NFR BLD AUTO: 8 %
NEUTROPHILS # BLD AUTO: 8.4 10E3/UL (ref 1.6–8.3)
NEUTROPHILS NFR BLD AUTO: 57 %
NRBC # BLD AUTO: 0 10E3/UL
NRBC BLD AUTO-RTO: 0 /100
PLAT MORPH BLD: ABNORMAL
PLATELET # BLD AUTO: 441 10E3/UL (ref 150–450)
POTASSIUM SERPL-SCNC: 4.9 MMOL/L (ref 3.4–5.3)
RBC # BLD AUTO: 4.09 10E6/UL (ref 3.8–5.2)
RBC MORPH BLD: ABNORMAL
SODIUM SERPL-SCNC: 141 MMOL/L (ref 135–145)
VARIANT LYMPHS BLD QL SMEAR: PRESENT
WBC # BLD AUTO: 14.6 10E3/UL (ref 4–11)

## 2025-07-12 PROCEDURE — 73610 X-RAY EXAM OF ANKLE: CPT | Mod: RT

## 2025-07-12 PROCEDURE — 85025 COMPLETE CBC W/AUTO DIFF WBC: CPT | Performed by: FAMILY MEDICINE

## 2025-07-12 PROCEDURE — 93005 ELECTROCARDIOGRAM TRACING: CPT

## 2025-07-12 PROCEDURE — 73560 X-RAY EXAM OF KNEE 1 OR 2: CPT | Mod: RT

## 2025-07-12 PROCEDURE — 250N000011 HC RX IP 250 OP 636: Performed by: FAMILY MEDICINE

## 2025-07-12 PROCEDURE — 96376 TX/PRO/DX INJ SAME DRUG ADON: CPT

## 2025-07-12 PROCEDURE — 99285 EMERGENCY DEPT VISIT HI MDM: CPT | Performed by: FAMILY MEDICINE

## 2025-07-12 PROCEDURE — 96374 THER/PROPH/DIAG INJ IV PUSH: CPT

## 2025-07-12 PROCEDURE — 99285 EMERGENCY DEPT VISIT HI MDM: CPT | Mod: 25 | Performed by: FAMILY MEDICINE

## 2025-07-12 PROCEDURE — 80048 BASIC METABOLIC PNL TOTAL CA: CPT | Performed by: FAMILY MEDICINE

## 2025-07-12 PROCEDURE — 36415 COLL VENOUS BLD VENIPUNCTURE: CPT | Performed by: FAMILY MEDICINE

## 2025-07-12 PROCEDURE — 82962 GLUCOSE BLOOD TEST: CPT

## 2025-07-12 PROCEDURE — 73590 X-RAY EXAM OF LOWER LEG: CPT | Mod: RT

## 2025-07-12 RX ORDER — HYDROMORPHONE HYDROCHLORIDE 1 MG/ML
0.5 INJECTION, SOLUTION INTRAMUSCULAR; INTRAVENOUS; SUBCUTANEOUS
Refills: 0 | Status: COMPLETED | OUTPATIENT
Start: 2025-07-12 | End: 2025-07-13

## 2025-07-12 RX ORDER — ONDANSETRON 2 MG/ML
4 INJECTION INTRAMUSCULAR; INTRAVENOUS
Status: DISCONTINUED | OUTPATIENT
Start: 2025-07-12 | End: 2025-07-13 | Stop reason: HOSPADM

## 2025-07-12 RX ADMIN — HYDROMORPHONE HYDROCHLORIDE 0.5 MG: 1 INJECTION, SOLUTION INTRAMUSCULAR; INTRAVENOUS; SUBCUTANEOUS at 21:28

## 2025-07-12 RX ADMIN — HYDROMORPHONE HYDROCHLORIDE 0.5 MG: 1 INJECTION, SOLUTION INTRAMUSCULAR; INTRAVENOUS; SUBCUTANEOUS at 20:18

## 2025-07-12 ASSESSMENT — ACTIVITIES OF DAILY LIVING (ADL)
ADLS_ACUITY_SCORE: 42

## 2025-07-13 ENCOUNTER — ANESTHESIA EVENT (OUTPATIENT)
Dept: SURGERY | Facility: CLINIC | Age: 40
End: 2025-07-13
Payer: COMMERCIAL

## 2025-07-13 ENCOUNTER — HOSPITAL ENCOUNTER (OUTPATIENT)
Facility: CLINIC | Age: 40
End: 2025-07-13
Attending: ORTHOPAEDIC SURGERY | Admitting: ORTHOPAEDIC SURGERY
Payer: COMMERCIAL

## 2025-07-13 ENCOUNTER — APPOINTMENT (OUTPATIENT)
Dept: GENERAL RADIOLOGY | Facility: CLINIC | Age: 40
End: 2025-07-13
Attending: SURGERY
Payer: COMMERCIAL

## 2025-07-13 ENCOUNTER — APPOINTMENT (OUTPATIENT)
Dept: GENERAL RADIOLOGY | Facility: CLINIC | Age: 40
End: 2025-07-13
Attending: ORTHOPAEDIC SURGERY
Payer: COMMERCIAL

## 2025-07-13 ENCOUNTER — HOSPITAL ENCOUNTER (INPATIENT)
Facility: CLINIC | Age: 40
LOS: 3 days | Discharge: NURSING, REHAB OR LONG-TERM CARE | End: 2025-07-16
Attending: ORTHOPAEDIC SURGERY | Admitting: ORTHOPAEDIC SURGERY
Payer: COMMERCIAL

## 2025-07-13 ENCOUNTER — ANESTHESIA (OUTPATIENT)
Dept: SURGERY | Facility: CLINIC | Age: 40
End: 2025-07-13
Payer: COMMERCIAL

## 2025-07-13 ENCOUNTER — APPOINTMENT (OUTPATIENT)
Dept: CT IMAGING | Facility: CLINIC | Age: 40
End: 2025-07-13
Attending: SURGERY
Payer: COMMERCIAL

## 2025-07-13 VITALS
TEMPERATURE: 98.9 F | HEART RATE: 66 BPM | WEIGHT: 230 LBS | SYSTOLIC BLOOD PRESSURE: 153 MMHG | RESPIRATION RATE: 20 BRPM | OXYGEN SATURATION: 92 % | DIASTOLIC BLOOD PRESSURE: 70 MMHG | HEIGHT: 65 IN | BODY MASS INDEX: 38.32 KG/M2

## 2025-07-13 DIAGNOSIS — S82.201A TIBIA/FIBULA FRACTURE, RIGHT, CLOSED, INITIAL ENCOUNTER: Primary | ICD-10-CM

## 2025-07-13 DIAGNOSIS — S82.401A TIBIA/FIBULA FRACTURE, RIGHT, CLOSED, INITIAL ENCOUNTER: Primary | ICD-10-CM

## 2025-07-13 PROBLEM — T14.8XXA FRACTURE: Status: ACTIVE | Noted: 2025-07-13

## 2025-07-13 LAB
ABO + RH BLD: NORMAL
ANION GAP SERPL CALCULATED.3IONS-SCNC: 11 MMOL/L (ref 7–15)
ATRIAL RATE - MUSE: 71 BPM
BLD GP AB SCN SERPL QL: NEGATIVE
BUN SERPL-MCNC: 28.9 MG/DL (ref 6–20)
CALCIUM SERPL-MCNC: 9 MG/DL (ref 8.8–10.4)
CHLORIDE SERPL-SCNC: 104 MMOL/L (ref 98–107)
CREAT SERPL-MCNC: 1.73 MG/DL (ref 0.51–0.95)
DIASTOLIC BLOOD PRESSURE - MUSE: NORMAL MMHG
EGFRCR SERPLBLD CKD-EPI 2021: 38 ML/MIN/1.73M2
ERYTHROCYTE [DISTWIDTH] IN BLOOD BY AUTOMATED COUNT: 17.1 % (ref 10–15)
GLUCOSE BLDC GLUCOMTR-MCNC: 145 MG/DL (ref 70–99)
GLUCOSE BLDC GLUCOMTR-MCNC: 239 MG/DL (ref 70–99)
GLUCOSE BLDC GLUCOMTR-MCNC: 93 MG/DL (ref 70–99)
GLUCOSE SERPL-MCNC: 94 MG/DL (ref 70–99)
HCO3 SERPL-SCNC: 25 MMOL/L (ref 22–29)
HCT VFR BLD AUTO: 36.8 % (ref 35–47)
HGB BLD-MCNC: 11.2 G/DL (ref 11.7–15.7)
INR PPP: 1.02 (ref 0.85–1.15)
INTERPRETATION ECG - MUSE: NORMAL
MCH RBC QN AUTO: 26.4 PG (ref 26.5–33)
MCHC RBC AUTO-ENTMCNC: 30.4 G/DL (ref 31.5–36.5)
MCV RBC AUTO: 87 FL (ref 78–100)
P AXIS - MUSE: 42 DEGREES
PLATELET # BLD AUTO: 413 10E3/UL (ref 150–450)
POTASSIUM SERPL-SCNC: 4.3 MMOL/L (ref 3.4–5.3)
PR INTERVAL - MUSE: 150 MS
PROTHROMBIN TIME: 13.8 SECONDS (ref 11.8–14.8)
QRS DURATION - MUSE: 84 MS
QT - MUSE: 394 MS
QTC - MUSE: 428 MS
R AXIS - MUSE: 50 DEGREES
RBC # BLD AUTO: 4.24 10E6/UL (ref 3.8–5.2)
SODIUM SERPL-SCNC: 140 MMOL/L (ref 135–145)
SPECIMEN EXP DATE BLD: NORMAL
SYSTOLIC BLOOD PRESSURE - MUSE: NORMAL MMHG
T AXIS - MUSE: 61 DEGREES
VENTRICULAR RATE- MUSE: 71 BPM
WBC # BLD AUTO: 14.8 10E3/UL (ref 4–11)

## 2025-07-13 PROCEDURE — 73590 X-RAY EXAM OF LOWER LEG: CPT | Mod: RT

## 2025-07-13 PROCEDURE — 36415 COLL VENOUS BLD VENIPUNCTURE: CPT | Performed by: SURGERY

## 2025-07-13 PROCEDURE — 999N000141 HC STATISTIC PRE-PROCEDURE NURSING ASSESSMENT: Performed by: ORTHOPAEDIC SURGERY

## 2025-07-13 PROCEDURE — 86900 BLOOD TYPING SEROLOGIC ABO: CPT | Performed by: SURGERY

## 2025-07-13 PROCEDURE — 250N000025 HC SEVOFLURANE, PER MIN: Performed by: ORTHOPAEDIC SURGERY

## 2025-07-13 PROCEDURE — C1713 ANCHOR/SCREW BN/BN,TIS/BN: HCPCS | Performed by: ORTHOPAEDIC SURGERY

## 2025-07-13 PROCEDURE — 85018 HEMOGLOBIN: CPT | Performed by: SURGERY

## 2025-07-13 PROCEDURE — 0QSG06Z REPOSITION RIGHT TIBIA WITH INTRAMEDULLARY INTERNAL FIXATION DEVICE, OPEN APPROACH: ICD-10-PCS | Performed by: ORTHOPAEDIC SURGERY

## 2025-07-13 PROCEDURE — 250N000011 HC RX IP 250 OP 636: Performed by: ORTHOPAEDIC SURGERY

## 2025-07-13 PROCEDURE — C1769 GUIDE WIRE: HCPCS | Performed by: ORTHOPAEDIC SURGERY

## 2025-07-13 PROCEDURE — 250N000011 HC RX IP 250 OP 636: Performed by: NURSE ANESTHETIST, CERTIFIED REGISTERED

## 2025-07-13 PROCEDURE — 96376 TX/PRO/DX INJ SAME DRUG ADON: CPT

## 2025-07-13 PROCEDURE — 250N000011 HC RX IP 250 OP 636: Performed by: ANESTHESIOLOGY

## 2025-07-13 PROCEDURE — 85610 PROTHROMBIN TIME: CPT | Performed by: SURGERY

## 2025-07-13 PROCEDURE — 73700 CT LOWER EXTREMITY W/O DYE: CPT | Mod: RT

## 2025-07-13 PROCEDURE — 272N000001 HC OR GENERAL SUPPLY STERILE: Performed by: ORTHOPAEDIC SURGERY

## 2025-07-13 PROCEDURE — 0QSJ04Z REPOSITION RIGHT FIBULA WITH INTERNAL FIXATION DEVICE, OPEN APPROACH: ICD-10-PCS | Performed by: ORTHOPAEDIC SURGERY

## 2025-07-13 PROCEDURE — 120N000002 HC R&B MED SURG/OB UMMC

## 2025-07-13 PROCEDURE — 250N000011 HC RX IP 250 OP 636: Performed by: SURGERY

## 2025-07-13 PROCEDURE — 250N000011 HC RX IP 250 OP 636: Performed by: FAMILY MEDICINE

## 2025-07-13 PROCEDURE — 27759 TREATMENT OF TIBIA FRACTURE: CPT | Mod: GC | Performed by: ORTHOPAEDIC SURGERY

## 2025-07-13 PROCEDURE — 250N000013 HC RX MED GY IP 250 OP 250 PS 637: Performed by: SURGERY

## 2025-07-13 PROCEDURE — 250N000009 HC RX 250: Performed by: NURSE ANESTHETIST, CERTIFIED REGISTERED

## 2025-07-13 PROCEDURE — 250N000013 HC RX MED GY IP 250 OP 250 PS 637: Performed by: INTERNAL MEDICINE

## 2025-07-13 PROCEDURE — 250N000009 HC RX 250: Performed by: ANESTHESIOLOGY

## 2025-07-13 PROCEDURE — 999N000180 XR SURGERY CARM FLUORO LESS THAN 5 MIN

## 2025-07-13 PROCEDURE — 360N000084 HC SURGERY LEVEL 4 W/ FLUORO, PER MIN: Performed by: ORTHOPAEDIC SURGERY

## 2025-07-13 PROCEDURE — 710N000010 HC RECOVERY PHASE 1, LEVEL 2, PER MIN: Performed by: ORTHOPAEDIC SURGERY

## 2025-07-13 PROCEDURE — 258N000003 HC RX IP 258 OP 636: Performed by: NURSE ANESTHETIST, CERTIFIED REGISTERED

## 2025-07-13 PROCEDURE — 370N000017 HC ANESTHESIA TECHNICAL FEE, PER MIN: Performed by: ORTHOPAEDIC SURGERY

## 2025-07-13 PROCEDURE — 80048 BASIC METABOLIC PNL TOTAL CA: CPT | Performed by: SURGERY

## 2025-07-13 PROCEDURE — 27828 TREAT LOWER LEG FRACTURE: CPT | Mod: GC | Performed by: ORTHOPAEDIC SURGERY

## 2025-07-13 PROCEDURE — 250N000012 HC RX MED GY IP 250 OP 636 PS 637: Performed by: INTERNAL MEDICINE

## 2025-07-13 PROCEDURE — 99254 IP/OBS CNSLTJ NEW/EST MOD 60: CPT | Performed by: INTERNAL MEDICINE

## 2025-07-13 DEVICE — LOCKING SCREW
Type: IMPLANTABLE DEVICE | Site: TIBIA | Status: FUNCTIONAL
Brand: T2 ALPHA

## 2025-07-13 DEVICE — GUIDE WIRE
Type: IMPLANTABLE DEVICE | Site: TIBIA | Status: FUNCTIONAL
Brand: T2 ALPHA

## 2025-07-13 DEVICE — IMPLANTABLE DEVICE: Type: IMPLANTABLE DEVICE | Site: FIBULA | Status: FUNCTIONAL

## 2025-07-13 DEVICE — LOCKING SCR T10 FT 3.5MM / L22MM: Type: IMPLANTABLE DEVICE | Site: FIBULA | Status: FUNCTIONAL

## 2025-07-13 DEVICE — SCREW LOCKING T10 3.5X16MM: Type: IMPLANTABLE DEVICE | Site: FIBULA | Status: FUNCTIONAL

## 2025-07-13 DEVICE — IMPLANTABLE DEVICE: Type: IMPLANTABLE DEVICE | Site: TIBIA | Status: FUNCTIONAL

## 2025-07-13 DEVICE — IMP BONE SCR T10 FT 3.5MM / L14MM 657414: Type: IMPLANTABLE DEVICE | Site: FIBULA | Status: FUNCTIONAL

## 2025-07-13 DEVICE — TIBIAL NAIL
Type: IMPLANTABLE DEVICE | Site: TIBIA | Status: FUNCTIONAL
Brand: T2 ALPHA

## 2025-07-13 DEVICE — SCREW LOCKING T10 3.5X18MM: Type: IMPLANTABLE DEVICE | Site: FIBULA | Status: FUNCTIONAL

## 2025-07-13 DEVICE — K-WIRE, STERILE: Type: IMPLANTABLE DEVICE | Site: TIBIA | Status: FUNCTIONAL

## 2025-07-13 DEVICE — IMP SCR BONE 3.5MM 12MML T10 HEX DRIVE FT 657412: Type: IMPLANTABLE DEVICE | Site: FIBULA | Status: FUNCTIONAL

## 2025-07-13 RX ORDER — BISACODYL 10 MG
10 SUPPOSITORY, RECTAL RECTAL DAILY PRN
Status: DISCONTINUED | OUTPATIENT
Start: 2025-07-13 | End: 2025-07-16 | Stop reason: HOSPADM

## 2025-07-13 RX ORDER — NALOXONE HYDROCHLORIDE 0.4 MG/ML
0.4 INJECTION, SOLUTION INTRAMUSCULAR; INTRAVENOUS; SUBCUTANEOUS
Status: DISCONTINUED | OUTPATIENT
Start: 2025-07-13 | End: 2025-07-16 | Stop reason: HOSPADM

## 2025-07-13 RX ORDER — DEXTROSE MONOHYDRATE 25 G/50ML
25-50 INJECTION, SOLUTION INTRAVENOUS
Status: DISCONTINUED | OUTPATIENT
Start: 2025-07-13 | End: 2025-07-16 | Stop reason: HOSPADM

## 2025-07-13 RX ORDER — LORATADINE 10 MG/1
10 TABLET ORAL EVERY MORNING
Status: DISCONTINUED | OUTPATIENT
Start: 2025-07-14 | End: 2025-07-16 | Stop reason: HOSPADM

## 2025-07-13 RX ORDER — PANTOPRAZOLE SODIUM 40 MG/1
40 TABLET, DELAYED RELEASE ORAL 2 TIMES DAILY
Status: DISCONTINUED | OUTPATIENT
Start: 2025-07-13 | End: 2025-07-16 | Stop reason: HOSPADM

## 2025-07-13 RX ORDER — TOPIRAMATE 25 MG/1
25 TABLET, FILM COATED ORAL 2 TIMES DAILY
Status: DISCONTINUED | OUTPATIENT
Start: 2025-07-13 | End: 2025-07-16 | Stop reason: HOSPADM

## 2025-07-13 RX ORDER — BUPIVACAINE HCL/EPINEPHRINE 0.25-.0005
VIAL (ML) INJECTION
Status: COMPLETED | OUTPATIENT
Start: 2025-07-13 | End: 2025-07-13

## 2025-07-13 RX ORDER — CEFAZOLIN SODIUM 2 G/50ML
2 SOLUTION INTRAVENOUS EVERY 8 HOURS
Status: COMPLETED | OUTPATIENT
Start: 2025-07-14 | End: 2025-07-14

## 2025-07-13 RX ORDER — ACETAMINOPHEN 325 MG/1
975 TABLET ORAL 3 TIMES DAILY
Status: DISCONTINUED | OUTPATIENT
Start: 2025-07-13 | End: 2025-07-16 | Stop reason: HOSPADM

## 2025-07-13 RX ORDER — OXYCODONE HYDROCHLORIDE 5 MG/1
5 TABLET ORAL EVERY 4 HOURS PRN
Refills: 0 | Status: DISCONTINUED | OUTPATIENT
Start: 2025-07-13 | End: 2025-07-14 | Stop reason: ALTCHOICE

## 2025-07-13 RX ORDER — FENTANYL CITRATE 50 UG/ML
25-50 INJECTION, SOLUTION INTRAMUSCULAR; INTRAVENOUS
Status: DISCONTINUED | OUTPATIENT
Start: 2025-07-13 | End: 2025-07-13 | Stop reason: HOSPADM

## 2025-07-13 RX ORDER — OXYCODONE HYDROCHLORIDE 10 MG/1
10 TABLET ORAL EVERY 4 HOURS PRN
Refills: 0 | Status: DISCONTINUED | OUTPATIENT
Start: 2025-07-13 | End: 2025-07-14 | Stop reason: ALTCHOICE

## 2025-07-13 RX ORDER — CEFAZOLIN SODIUM/WATER 2 G/20 ML
2 SYRINGE (ML) INTRAVENOUS
Status: DISCONTINUED | OUTPATIENT
Start: 2025-07-13 | End: 2025-07-13 | Stop reason: HOSPADM

## 2025-07-13 RX ORDER — NALOXONE HYDROCHLORIDE 0.4 MG/ML
0.2 INJECTION, SOLUTION INTRAMUSCULAR; INTRAVENOUS; SUBCUTANEOUS
Status: DISCONTINUED | OUTPATIENT
Start: 2025-07-13 | End: 2025-07-13 | Stop reason: HOSPADM

## 2025-07-13 RX ORDER — ATORVASTATIN CALCIUM 20 MG/1
20 TABLET, FILM COATED ORAL DAILY
Status: DISCONTINUED | OUTPATIENT
Start: 2025-07-13 | End: 2025-07-16 | Stop reason: HOSPADM

## 2025-07-13 RX ORDER — TRANEXAMIC ACID 10 MG/ML
1 INJECTION, SOLUTION INTRAVENOUS ONCE
Status: COMPLETED | OUTPATIENT
Start: 2025-07-13 | End: 2025-07-13

## 2025-07-13 RX ORDER — CARVEDILOL 12.5 MG/1
12.5 TABLET ORAL 2 TIMES DAILY WITH MEALS
Status: DISCONTINUED | OUTPATIENT
Start: 2025-07-13 | End: 2025-07-16 | Stop reason: HOSPADM

## 2025-07-13 RX ORDER — NALOXONE HYDROCHLORIDE 0.4 MG/ML
0.2 INJECTION, SOLUTION INTRAMUSCULAR; INTRAVENOUS; SUBCUTANEOUS
Status: DISCONTINUED | OUTPATIENT
Start: 2025-07-13 | End: 2025-07-16 | Stop reason: HOSPADM

## 2025-07-13 RX ORDER — AMOXICILLIN 250 MG
1-2 CAPSULE ORAL 2 TIMES DAILY
Status: DISCONTINUED | OUTPATIENT
Start: 2025-07-13 | End: 2025-07-16 | Stop reason: HOSPADM

## 2025-07-13 RX ORDER — PROCHLORPERAZINE MALEATE 5 MG/1
10 TABLET ORAL EVERY 6 HOURS PRN
Status: DISCONTINUED | OUTPATIENT
Start: 2025-07-13 | End: 2025-07-16 | Stop reason: HOSPADM

## 2025-07-13 RX ORDER — HYDROMORPHONE HCL IN WATER/PF 6 MG/30 ML
0.4 PATIENT CONTROLLED ANALGESIA SYRINGE INTRAVENOUS
Refills: 0 | Status: DISCONTINUED | OUTPATIENT
Start: 2025-07-13 | End: 2025-07-16 | Stop reason: HOSPADM

## 2025-07-13 RX ORDER — NALOXONE HYDROCHLORIDE 0.4 MG/ML
0.4 INJECTION, SOLUTION INTRAMUSCULAR; INTRAVENOUS; SUBCUTANEOUS
Status: DISCONTINUED | OUTPATIENT
Start: 2025-07-13 | End: 2025-07-13 | Stop reason: HOSPADM

## 2025-07-13 RX ORDER — CEFAZOLIN SODIUM 2 G/50ML
2 SOLUTION INTRAVENOUS SEE ADMIN INSTRUCTIONS
Status: DISCONTINUED | OUTPATIENT
Start: 2025-07-13 | End: 2025-07-13 | Stop reason: HOSPADM

## 2025-07-13 RX ORDER — HYDROMORPHONE HYDROCHLORIDE 1 MG/ML
1 INJECTION, SOLUTION INTRAMUSCULAR; INTRAVENOUS; SUBCUTANEOUS ONCE
Status: COMPLETED | OUTPATIENT
Start: 2025-07-13 | End: 2025-07-13

## 2025-07-13 RX ORDER — HYDROXYZINE HYDROCHLORIDE 50 MG/1
50 TABLET, FILM COATED ORAL EVERY 6 HOURS PRN
Status: DISCONTINUED | OUTPATIENT
Start: 2025-07-13 | End: 2025-07-16 | Stop reason: HOSPADM

## 2025-07-13 RX ORDER — NICOTINE POLACRILEX 4 MG
15-30 LOZENGE BUCCAL
Status: DISCONTINUED | OUTPATIENT
Start: 2025-07-13 | End: 2025-07-16 | Stop reason: HOSPADM

## 2025-07-13 RX ORDER — ONDANSETRON 2 MG/ML
4 INJECTION INTRAMUSCULAR; INTRAVENOUS ONCE
Status: DISCONTINUED | OUTPATIENT
Start: 2025-07-13 | End: 2025-07-13 | Stop reason: HOSPADM

## 2025-07-13 RX ORDER — BUMETANIDE 0.5 MG/1
0.5 TABLET ORAL DAILY
Status: DISCONTINUED | OUTPATIENT
Start: 2025-07-13 | End: 2025-07-16 | Stop reason: HOSPADM

## 2025-07-13 RX ORDER — ONDANSETRON 2 MG/ML
4 INJECTION INTRAMUSCULAR; INTRAVENOUS EVERY 6 HOURS PRN
Status: DISCONTINUED | OUTPATIENT
Start: 2025-07-13 | End: 2025-07-16 | Stop reason: HOSPADM

## 2025-07-13 RX ORDER — POLYETHYLENE GLYCOL 3350 17 G/17G
17 POWDER, FOR SOLUTION ORAL DAILY PRN
Status: DISCONTINUED | OUTPATIENT
Start: 2025-07-13 | End: 2025-07-16 | Stop reason: HOSPADM

## 2025-07-13 RX ORDER — AMITRIPTYLINE HYDROCHLORIDE 10 MG/1
10 TABLET ORAL AT BEDTIME
Status: DISCONTINUED | OUTPATIENT
Start: 2025-07-13 | End: 2025-07-16 | Stop reason: HOSPADM

## 2025-07-13 RX ORDER — CLOZAPINE 100 MG/1
100 TABLET ORAL AT BEDTIME
Status: DISCONTINUED | OUTPATIENT
Start: 2025-07-13 | End: 2025-07-16 | Stop reason: HOSPADM

## 2025-07-13 RX ORDER — ONDANSETRON 2 MG/ML
INJECTION INTRAMUSCULAR; INTRAVENOUS PRN
Status: DISCONTINUED | OUTPATIENT
Start: 2025-07-13 | End: 2025-07-13

## 2025-07-13 RX ORDER — HYDROXYZINE HYDROCHLORIDE 25 MG/1
25 TABLET, FILM COATED ORAL EVERY 6 HOURS PRN
Status: DISCONTINUED | OUTPATIENT
Start: 2025-07-13 | End: 2025-07-16 | Stop reason: HOSPADM

## 2025-07-13 RX ORDER — DEXAMETHASONE SODIUM PHOSPHATE 10 MG/ML
INJECTION, SOLUTION INTRAMUSCULAR; INTRAVENOUS
Status: COMPLETED | OUTPATIENT
Start: 2025-07-13 | End: 2025-07-13

## 2025-07-13 RX ORDER — ONDANSETRON 4 MG/1
4 TABLET, ORALLY DISINTEGRATING ORAL EVERY 6 HOURS PRN
Status: DISCONTINUED | OUTPATIENT
Start: 2025-07-13 | End: 2025-07-16 | Stop reason: HOSPADM

## 2025-07-13 RX ORDER — QUETIAPINE FUMARATE 25 MG/1
25 TABLET, FILM COATED ORAL AT BEDTIME
Status: DISCONTINUED | OUTPATIENT
Start: 2025-07-13 | End: 2025-07-16 | Stop reason: HOSPADM

## 2025-07-13 RX ORDER — ARIPIPRAZOLE 5 MG/1
15 TABLET ORAL DAILY
Status: DISCONTINUED | OUTPATIENT
Start: 2025-07-13 | End: 2025-07-16 | Stop reason: HOSPADM

## 2025-07-13 RX ORDER — METHOCARBAMOL 750 MG/1
750 TABLET, FILM COATED ORAL 3 TIMES DAILY
Status: DISCONTINUED | OUTPATIENT
Start: 2025-07-13 | End: 2025-07-13

## 2025-07-13 RX ORDER — LAMOTRIGINE 100 MG/1
100 TABLET ORAL AT BEDTIME
Status: DISCONTINUED | OUTPATIENT
Start: 2025-07-13 | End: 2025-07-16 | Stop reason: HOSPADM

## 2025-07-13 RX ORDER — HYDROMORPHONE HCL IN WATER/PF 6 MG/30 ML
0.2 PATIENT CONTROLLED ANALGESIA SYRINGE INTRAVENOUS
Refills: 0 | Status: DISCONTINUED | OUTPATIENT
Start: 2025-07-13 | End: 2025-07-16 | Stop reason: HOSPADM

## 2025-07-13 RX ORDER — PROPOFOL 10 MG/ML
INJECTION, EMULSION INTRAVENOUS PRN
Status: DISCONTINUED | OUTPATIENT
Start: 2025-07-13 | End: 2025-07-13

## 2025-07-13 RX ORDER — METHOCARBAMOL 750 MG/1
750 TABLET, FILM COATED ORAL 3 TIMES DAILY
Status: DISCONTINUED | OUTPATIENT
Start: 2025-07-13 | End: 2025-07-16 | Stop reason: HOSPADM

## 2025-07-13 RX ORDER — FLUMAZENIL 0.1 MG/ML
0.2 INJECTION, SOLUTION INTRAVENOUS
Status: DISCONTINUED | OUTPATIENT
Start: 2025-07-13 | End: 2025-07-13 | Stop reason: HOSPADM

## 2025-07-13 RX ORDER — SODIUM CHLORIDE, SODIUM LACTATE, POTASSIUM CHLORIDE, CALCIUM CHLORIDE 600; 310; 30; 20 MG/100ML; MG/100ML; MG/100ML; MG/100ML
INJECTION, SOLUTION INTRAVENOUS CONTINUOUS PRN
Status: DISCONTINUED | OUTPATIENT
Start: 2025-07-13 | End: 2025-07-13

## 2025-07-13 RX ORDER — LIDOCAINE HYDROCHLORIDE 20 MG/ML
INJECTION, SOLUTION INFILTRATION; PERINEURAL PRN
Status: DISCONTINUED | OUTPATIENT
Start: 2025-07-13 | End: 2025-07-13

## 2025-07-13 RX ORDER — TRANEXAMIC ACID 10 MG/ML
1 INJECTION, SOLUTION INTRAVENOUS ONCE
Status: DISCONTINUED | OUTPATIENT
Start: 2025-07-13 | End: 2025-07-13 | Stop reason: HOSPADM

## 2025-07-13 RX ADMIN — ACETAMINOPHEN 975 MG: 325 TABLET ORAL at 09:22

## 2025-07-13 RX ADMIN — ARIPIPRAZOLE 15 MG: 5 TABLET ORAL at 12:21

## 2025-07-13 RX ADMIN — TRANEXAMIC ACID 1 G: 10 INJECTION, SOLUTION INTRAVENOUS at 15:36

## 2025-07-13 RX ADMIN — PHENYLEPHRINE HYDROCHLORIDE 0.3 MCG/KG/MIN: 10 INJECTION INTRAVENOUS at 15:48

## 2025-07-13 RX ADMIN — DEXAMETHASONE SODIUM PHOSPHATE 2 MG: 10 INJECTION, SOLUTION INTRAMUSCULAR; INTRAVENOUS at 15:06

## 2025-07-13 RX ADMIN — INSULIN GLARGINE 15 UNITS: 100 INJECTION, SOLUTION SUBCUTANEOUS at 12:27

## 2025-07-13 RX ADMIN — SUVOREXANT 10 MG: 10 TABLET, FILM COATED ORAL at 22:22

## 2025-07-13 RX ADMIN — HYDROMORPHONE HYDROCHLORIDE 1 MG: 1 INJECTION, SOLUTION INTRAMUSCULAR; INTRAVENOUS; SUBCUTANEOUS at 05:08

## 2025-07-13 RX ADMIN — LIDOCAINE HYDROCHLORIDE 100 MG: 20 INJECTION, SOLUTION INFILTRATION; PERINEURAL at 15:26

## 2025-07-13 RX ADMIN — CLOZAPINE 100 MG: 100 TABLET ORAL at 22:21

## 2025-07-13 RX ADMIN — OXYCODONE HYDROCHLORIDE 10 MG: 10 TABLET ORAL at 12:34

## 2025-07-13 RX ADMIN — OXYCODONE HYDROCHLORIDE 10 MG: 10 TABLET ORAL at 05:08

## 2025-07-13 RX ADMIN — FLUOXETINE HYDROCHLORIDE 60 MG: 40 CAPSULE ORAL at 12:21

## 2025-07-13 RX ADMIN — ONDANSETRON 4 MG: 2 INJECTION INTRAMUSCULAR; INTRAVENOUS at 18:35

## 2025-07-13 RX ADMIN — PHENYLEPHRINE HYDROCHLORIDE 100 MCG: 10 INJECTION INTRAVENOUS at 15:42

## 2025-07-13 RX ADMIN — METHOCARBAMOL 750 MG: 750 TABLET ORAL at 05:08

## 2025-07-13 RX ADMIN — SODIUM CHLORIDE, SODIUM LACTATE, POTASSIUM CHLORIDE, AND CALCIUM CHLORIDE: .6; .31; .03; .02 INJECTION, SOLUTION INTRAVENOUS at 18:44

## 2025-07-13 RX ADMIN — HYDROMORPHONE HYDROCHLORIDE 0.4 MG: 0.2 INJECTION, SOLUTION INTRAMUSCULAR; INTRAVENOUS; SUBCUTANEOUS at 02:13

## 2025-07-13 RX ADMIN — PROPOFOL 200 MG: 10 INJECTION, EMULSION INTRAVENOUS at 15:27

## 2025-07-13 RX ADMIN — SUGAMMADEX 100 MG: 100 INJECTION, SOLUTION INTRAVENOUS at 19:06

## 2025-07-13 RX ADMIN — MIDAZOLAM 2 MG: 1 INJECTION INTRAMUSCULAR; INTRAVENOUS at 15:19

## 2025-07-13 RX ADMIN — Medication 20 ML: at 14:53

## 2025-07-13 RX ADMIN — AMITRIPTYLINE HYDROCHLORIDE 10 MG: 10 TABLET, COATED ORAL at 22:21

## 2025-07-13 RX ADMIN — SODIUM CHLORIDE, SODIUM LACTATE, POTASSIUM CHLORIDE, AND CALCIUM CHLORIDE: .6; .31; .03; .02 INJECTION, SOLUTION INTRAVENOUS at 15:19

## 2025-07-13 RX ADMIN — BUPIVACAINE HYDROCHLORIDE AND EPINEPHRINE BITARTRATE 20 ML: 2.5; .005 INJECTION, SOLUTION INFILTRATION; PERINEURAL at 15:06

## 2025-07-13 RX ADMIN — HYDROMORPHONE HYDROCHLORIDE 0.5 MG: 1 INJECTION, SOLUTION INTRAMUSCULAR; INTRAVENOUS; SUBCUTANEOUS at 00:08

## 2025-07-13 RX ADMIN — FENTANYL CITRATE 50 MCG: 50 INJECTION, SOLUTION INTRAMUSCULAR; INTRAVENOUS at 14:52

## 2025-07-13 RX ADMIN — QUETIAPINE FUMARATE 25 MG: 25 TABLET ORAL at 22:21

## 2025-07-13 RX ADMIN — LAMOTRIGINE 100 MG: 100 TABLET ORAL at 22:21

## 2025-07-13 RX ADMIN — HYDROXYZINE HYDROCHLORIDE 50 MG: 50 TABLET ORAL at 05:08

## 2025-07-13 RX ADMIN — Medication 40 MG: at 15:29

## 2025-07-13 RX ADMIN — TOPIRAMATE 25 MG: 25 TABLET, FILM COATED ORAL at 12:21

## 2025-07-13 RX ADMIN — ATORVASTATIN CALCIUM 20 MG: 20 TABLET, FILM COATED ORAL at 12:21

## 2025-07-13 RX ADMIN — HYDROMORPHONE HYDROCHLORIDE 0.4 MG: 0.2 INJECTION, SOLUTION INTRAMUSCULAR; INTRAVENOUS; SUBCUTANEOUS at 04:24

## 2025-07-13 RX ADMIN — OXYCODONE HYDROCHLORIDE 10 MG: 10 TABLET ORAL at 21:14

## 2025-07-13 RX ADMIN — CEFAZOLIN SODIUM 2 G: 2 SOLUTION INTRAVENOUS at 15:35

## 2025-07-13 RX ADMIN — PANTOPRAZOLE SODIUM 40 MG: 40 TABLET, DELAYED RELEASE ORAL at 12:21

## 2025-07-13 RX ADMIN — CARVEDILOL 12.5 MG: 12.5 TABLET, FILM COATED ORAL at 12:21

## 2025-07-13 RX ADMIN — MIDAZOLAM 1 MG: 1 INJECTION INTRAMUSCULAR; INTRAVENOUS at 14:51

## 2025-07-13 ASSESSMENT — ACTIVITIES OF DAILY LIVING (ADL)
ADLS_ACUITY_SCORE: 51
ADLS_ACUITY_SCORE: 51
ADLS_ACUITY_SCORE: 42
ADLS_ACUITY_SCORE: 42
ADLS_ACUITY_SCORE: 51
ADLS_ACUITY_SCORE: 51
ADLS_ACUITY_SCORE: 42
ADLS_ACUITY_SCORE: 51
ADLS_ACUITY_SCORE: 42
ADLS_ACUITY_SCORE: 51
ADLS_ACUITY_SCORE: 42
ADLS_ACUITY_SCORE: 54
ADLS_ACUITY_SCORE: 51
ADLS_ACUITY_SCORE: 51
ADLS_ACUITY_SCORE: 54
ADLS_ACUITY_SCORE: 51

## 2025-07-13 NOTE — ED TRIAGE NOTES
"Presents after near syncopal episode, patient was in kitchen, became dizzy and went down onto kitchen floor twisting right leg - pain \"10\"/10.  Patient has diagnosis of vertigo and has had \"these spells' before.  Patient did hit head on floor reportedly not hard.  Is NOT on blood thinners.  Glucose at scene per EMS was 66, given glucose gel.  Patient is on insulin pump.  Glucose in triage still 66, patient being given juice in triage.  Is alert and oriented.  Patient states insulin pump has new sensor and will not give any insulin for next 2 hours.     Triage Assessment (Adult)       Row Name 07/12/25 1496          Triage Assessment    Airway WDL WDL        Respiratory WDL    Respiratory WDL WDL        Skin Circulation/Temperature WDL    Skin Circulation/Temperature WDL WDL        Cardiac WDL    Cardiac WDL WDL        Peripheral/Neurovascular WDL    Peripheral Neurovascular WDL WDL        Cognitive/Neuro/Behavioral WDL    Cognitive/Neuro/Behavioral WDL WDL                     "

## 2025-07-13 NOTE — ED NOTES
Bed: ED04  Expected date:   Expected time:   Means of arrival:   Comments:  EMS  
Blood sugar currently 119  
Patient drinking juice until blood sugar >70. Patient reports this is what she does at home.  
Patient states leg is hurting from right knee down.  
Pt has continuous glucose monitor, , denied BG check from staff.   
No
Intermediate Repair Preamble Text (Leave Blank If You Do Not Want): Undermining was performed with blunt dissection.

## 2025-07-13 NOTE — ED PROVIDER NOTES
"  HPI   Patient is a 40-year-old female presenting with an injury to her right leg.  Per my chart review, the patient has a known history of vertigo.  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  She has had a DVT previously.  She has CKD stage III and diabetes type 2.  No recent medication changes.    The patient reports \"another vertigo episode this evening and I fell and hurt my right leg.\"  She experiences these episodes about 3 times a day.  She was moving to get to her chair but could not do so and fell to the ground.  No headache.  No loss of consciousness.  No neck pain.  No back pain.  No chest pain or shortness of breath.  No abdominal or pelvic pain.  No thigh or arm pain or injury.  She complains of right proximal leg pain and swelling.      Allergies:  Allergies   Allergen Reactions    Gabapentin Other (See Comments)     Interfered with psych meds causing angry episode    Oxycodone-Acetaminophen Other (See Comments)     weston Bustillos     Problem List:    Patient Active Problem List    Diagnosis Date Noted    Mirena IUD 4/7/25 04/07/2025     Priority: Medium     Clinic Administered Medication Documentation      Intrauterine/Implant Insertion Documentation    Device was placed by provider (please see MAR for given by information). Please see MAR and medication order for additional information.     Type: Mirena  Remove/Replace by: 04/7/2033    Expiration Date:  05/2027          Secondary renal hyperparathyroidism 10/30/2024     Priority: Medium    Peroneal tendinitis of left lower extremity 06/14/2024     Priority: Medium    Acute left ankle pain 06/14/2024     Priority: Medium    Avascular necrosis of bone of ankle (H) 06/14/2024     Priority: Medium    Other fracture of left femur, initial encounter for closed fracture (H) 01/21/2024     Priority: Medium    Chiari malformation type I (H) 06/12/2023     Priority: Medium    Ulnar neuropathy of both upper extremities 09/20/2022     " Priority: Medium    Insomnia, unspecified type 08/30/2021     Priority: Medium    History of DVT of arm  (deep vein thrombosis) 01/23/2021     Priority: Medium     ultrasound 1/6/2017-  venous thrombus in the antecubital fossa region and the left forearm as described      Schizoaffective disorder, depressive type (H) 07/19/2019     Priority: Medium    Acquired asplenia 03/22/2019     Priority: Medium    Chronic leukocytosis 11/27/2018     Priority: Medium     Due to spleen removal      Nicotine abuse 08/13/2018     Priority: Medium    Mild intermittent asthma with acute exacerbation 01/16/2018     Priority: Medium    Morbid obesity (H) 01/09/2018     Priority: Medium    IUD (intrauterine device) in place 07/26/2017     Priority: Medium     Mirena IUD inserted in office with premed 7/26/2017        Cervical high risk HPV (human papillomavirus) test positive 06/20/2017     Priority: Medium     6/15/17 NIL, +HR HPV (not 16/18). Plan cotest in 1 year  6/14/18 NIL pap, neg HPV. Plan cotest in 3 years  3/26/21 NIL pap, Neg HPV. Plan cotest in 3 years.   4/24/24 NIL pap, +HR HPV (not 16/18). Plan: cotest in 1 year  4/7/25 NIL pap, Neg HPV. 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, splenectomy, left adrenalectomy 02/26/2015;      Type 2 diabetes mellitus with stage 3 chronic kidney disease, with long-term current use of insulin (H) 02/27/2017     Priority: Medium    Bipolar disorder (H) 02/27/2017     Priority: Medium    Orthostatic hypotension 01/10/2017     Priority: Medium    Tobacco abuse 01/10/2017     Priority: Medium    Long term (current) use of anticoagulants 01/09/2017     Priority: Medium    Deep venous thrombosis of arm (H) 01/09/2017     Priority: Medium    Stage 3 chronic kidney disease 12/03/2015     Priority: Medium     Overview:   Updated per 10/1/17 IMO import       Leukocytosis, unspecified type 08/28/2015     Priority: Medium    Vitamin D insufficiency 06/01/2015     Priority: Medium    Cardiac murmur 08/20/2013     Priority: Medium    Depressive disorder 09/15/2012     Priority: Medium    Hyperlipidemia LDL goal <100 10/31/2010     Priority: Medium    Borderline personality disorder (H) 11/06/2009     Priority: Medium    Essential hypertension 06/13/2008     Priority: Medium    NAFL (nonalcoholic fatty liver) 04/11/2006     Priority: Medium     See flowsheet for hepatic panel.   Stopped zocor, was on godon as well had right upper quandrant ultrasound and ct  ? Psych med related vs [facility]      Esophageal reflux 04/14/2005     Priority: Medium     GERD      PTSD (post-traumatic stress disorder) 01/01/2001     Priority: Medium      Past Medical History:    Past Medical History:   Diagnosis Date    Acquired asplenia     Acute pain of left knee 03/22/2019    Acute-on-chronic kidney injury 03/22/2019    ARF (acute renal failure) 03/23/2019    Asthma     Bipolar disorder (H)     Cardiac murmur     Cervical high risk HPV (human papillomavirus) test positive 06/20/2017    Chiari malformation type I (H)     Chronic bilateral low back pain without sciatica     Deep venous thrombosis of arm (H) 01/06/2017    Depressive disorder     DVT (deep venous thrombosis) (H)     DVT of upper extremity (deep vein thrombosis) (H) 2021    Esophageal reflux     Hypertension     Insomnia     Leukocytosis     Mild intermittent asthma     Morbid obesity (H)     Mucinous neoplasm of pancreas     NAFL (nonalcoholic fatty liver)     Neck pain     Nicotine abuse     Other specified types of schizophrenia, unspecified condition     Schizoaffective disorder, depressive type (H)     Stage 3a chronic kidney disease (CKD) (H)     Type 2 diabetes mellitus (H)     Ulnar neuropathy of both upper extremities     Vitamin D insufficiency      Past Surgical History:    Past Surgical History:   Procedure Laterality Date     ADRENALECTOMY Left 2015    BIOPSY      BREAST SURGERY  2013    COLONOSCOPY      ENT SURGERY  10/01/2000    Tympanoplasty    IR LIVER BIOPSY PERCUTANEOUS  2019    PANCREATECTOMY PARTIAL  2015    PERCUTANEOUS BIOPSY LIVER Right 2019    Procedure: Percutaneous Liver Biopsy;  Surgeon: Sean Guzman PA-C;  Location: [facility] OR    SPLENECTOMY  2015    TONSILLECTOMY  10/01/2000    Tonsillectomy     Family History:    Family History   Problem Relation Age of Onset    Respiratory Mother         ASTHMA    Allergies Mother     Depression Mother     Chronic Obstructive Pulmonary Disease Mother     Anxiety Disorder Mother     Mental Illness Mother     Asthma Mother     Heart Disease Father         HEART VALVE REPLACEMENT    Hypertension Father     Diabetes Father     Depression Father     Anxiety Disorder Father     Mental Illness Father     Obesity Father     Respiratory Sister     Allergies Sister     Depression Sister     Respiratory Sister     Allergies Sister     Depression Sister     Respiratory Brother     Allergies Brother     Sleep Apnea Maternal Grandfather     Kidney Disease No family hx of     Liver Disease No family hx of     Liver Cancer No family hx of      Social History:  Marital Status:  Single [1]  Social History     Tobacco Use    Smoking status: Former     Current packs/day: 0.00     Average packs/day: 0.5 packs/day for 1.5 years (0.8 ttl pk-yrs)     Types: Cigarettes     Start date: 2023     Quit date: 2025     Years since quittin.1     Passive exposure: Yes    Smokeless tobacco: Never    Tobacco comments:     E- cig and cigarettes (2-3 cigarettes per day)   Vaping Use    Vaping status: Every Day    Substances: Nicotine, Flavoring    Devices: Disposable   Substance Use Topics    Alcohol use: No     Comment: sober since     Drug use: No      Medications:    acetaminophen (TYLENOL) 500 MG tablet  albuterol (VENTOLIN HFA) 108 (90 Base) MCG/ACT inhaler  amitriptyline  "(ELAVIL) 10 MG tablet  ARIPiprazole (ABILIFY) 15 MG tablet  atorvastatin (LIPITOR) 20 MG tablet  biotin (BIOTIN MAXIMUM STRENGTH) 5 MG CAPS  blood glucose (ACCU-CHEK GUIDE) test strip  bumetanide (BUMEX) 0.5 MG tablet  carvedilol (COREG) 12.5 MG tablet  cloZAPine (CLOZARIL) 50 MG tablet  docusate sodium (COLACE) 100 MG capsule  FLUoxetine (PROZAC) 20 MG capsule  glucose (BD GLUCOSE) 4 g chewable tablet  HYDROcodone-acetaminophen (NORCO) 5-325 MG tablet  hydrOXYzine HCl (ATARAX) 25 MG tablet  insulin aspart (NOVOLOG VIAL) 100 UNITS/ML vial  Insulin Infusion Pump (MINIMED 780G INSULIN PUMP) KIT  Insulin Infusion Pump Supplies (EXTENDED INFUSION SET 23\"/6MM) MISC  Insulin Infusion Pump Supplies (EXTENDED RESERVOIR 3ML) MISC  lamoTRIgine (LAMICTAL) 100 MG tablet  levonorgestrel (MIRENA) 52 MG (20 mcg/day) IUD  loratadine (CLARITIN) 10 MG tablet  LORazepam (ATIVAN) 0.5 MG tablet  metoclopramide (REGLAN) 10 MG tablet  omeprazole (PRILOSEC) 20 MG DR capsule  QUEtiapine (SEROQUEL) 25 MG tablet  Suvorexant (BELSOMRA) 10 MG tablet  topiramate (TOPAMAX) 25 MG tablet      Review of Systems   All other systems reviewed and are negative.      PE   BP: (!) 179/93  Pulse: 72  Temp: 98.9  F (37.2  C)  Resp: 20  Height: 165.1 cm (5' 5\")  Weight: 104.3 kg (230 lb)  SpO2: 98 %  Physical Exam  Vitals reviewed.   Constitutional:       Appearance: She is well-developed.      Comments: Tearful, obviously uncomfortable.   HENT:      Head: Normocephalic and atraumatic.      Right Ear: External ear normal.      Left Ear: External ear normal.      Nose: Nose normal.      Mouth/Throat:      Mouth: Mucous membranes are moist.      Pharynx: Oropharynx is clear.   Eyes:      Extraocular Movements: Extraocular movements intact.      Conjunctiva/sclera: Conjunctivae normal.      Pupils: Pupils are equal, round, and reactive to light.   Cardiovascular:      Rate and Rhythm: Normal rate and regular rhythm.   Pulmonary:      Effort: Pulmonary effort is " normal.   Musculoskeletal:      Cervical back: Normal range of motion.      Comments: There is swelling and tenderness along the proximal right leg, laterally.  Difficulty testing range of motion of the knee because of pain.  No obvious effusion.  Otherwise nontender to palpation of major muscles, joints, and long bones.   Skin:     General: Skin is warm and dry.   Neurological:      Mental Status: She is oriented to person, place, and time.   Psychiatric:         Behavior: Behavior normal.         ED COURSE and [facility]   2006.  Patient with injury after a fall, as above.  X-ray of the knee and leg pending.  Dilaudid ordered for pain control.    2150.  Dr. Guerra consulted.  He tells me that we did not have the necessary rotting materials here at [facility] and so transfer was recommended.  Patient has a displaced distal spiral fracture involving the tibia.  She has a proximal fibula fracture, displaced.  She has a lateral malleolus fracture, nondisplaced.    EKG  (1941)   Interpretation performed by me.  Rate: 71     Rhythm: ns     Axis: nl  Intervals: MN (12-2) 150, QRS (<12) 84, QTc (>5) 428  P wave: nl     QRS complex: nl   ST segment / T-wave: nl  Conclusion: nl    Electronic medical chart reviewed, including medical problems, medications, medical allergies, social history.  Recent hospitalizations and surgical procedures reviewed.  Recent clinic visits and consultations reviewed.  Recent labs and test results reviewed.  Nursing notes reviewed.    The patient, their parent if applicable, and/or their medical decision maker(s) and I have reviewed all of the available historical information, applicable PMH, physical exam findings, and objective diagnostic data gathered during this ED visit.  We then discussed all work-up options and then together agreed upon the course taken during this visit.  The ultimate disposition and plan was a cooperative decision made between myself and the patient, their parent if  applicable, and/or their legal decision maker(s).  The risks and benefits of all decisions made during this visit were discussed to the best of my abilities given the circumstances, and all parties are understanding of the pertinent ramifications of these decisions.      LABS  Labs Ordered and Resulted from Time of ED Arrival to Time of ED Departure   GLUCOSE BY METER - Abnormal       Result Value    GLUCOSE BY METER POCT 66 (*)    BASIC METABOLIC PANEL - Abnormal    Sodium 141      Potassium 4.9      Chloride 104      Carbon Dioxide (CO2) 25      Anion Gap 12      Urea Nitrogen 31.0 (*)     Creatinine 1.86 (*)     GFR Estimate 35 (*)     Calcium 9.3      Glucose 83     CBC WITH PLATELETS AND DIFFERENTIAL - Abnormal    WBC Count 14.6 (*)     RBC Count 4.09      Hemoglobin 10.8 (*)     Hematocrit 36.1      MCV 88      MCH 26.4 (*)     MCHC 29.9 (*)     RDW 16.8 (*)     Platelet Count 441      % Neutrophils 57      % Lymphocytes 31      % Monocytes 8      % Eosinophils 3      % Basophils 1      % Immature Granulocytes 1      NRBCs per 100 WBC 0      Absolute Neutrophils 8.4 (*)     Absolute Lymphocytes 4.6      Absolute Monocytes 1.1      Absolute Eosinophils 0.4      Absolute Basophils 0.1      Absolute Immature Granulocytes 0.1      Absolute NRBCs 0.0     RBC AND PLATELET MORPHOLOGY - Abnormal    RBC Morphology Confirmed RBC Indices      Platelet Assessment        Value: Automated Count Confirmed. Platelet morphology is normal.    Reactive Lymphocytes Present (*)    GLUCOSE BY METER - Abnormal    GLUCOSE BY METER POCT 119 (*)        IMAGING  Images reviewed by me.  Radiology report also reviewed.  XR Knee Right 3 Views    (Results Pending)   XR Tibia and Fibula Right 2 Views    (Results Pending)       Procedures    Medications   HYDROmorphone (PF) (DILAUDID) injection 0.5 mg (0.5 mg Intravenous $Given 7/12/25 2018)   ondansetron (ZOFRAN) injection 4 mg (has no administration in time range)         IMPRESSION  "  ***       Medication List      There are no discharge medications for this visit.       {ED Addendum:415587::\" \"}  {Trauma Activation or Fall?:262785::\" \"}    {Sepsis/Septic Shock/Stemi/Stroke:933933::\" \"}          " "right fibula, initial encounter  S82.831A       3. Closed nondisplaced fracture of lateral malleolus of right fibula, initial encounter  S82.64XA                Medication List        Discontinued      Accu-Chek Guide test strip  Generic drug: blood glucose     Extended Infusion Set 23\"/6mm Misc     Extended Reservoir 3ml Misc     MiniMed 780G Insulin Pump Kit                                  Wong Santana MD  07/24/25 6931    "

## 2025-07-13 NOTE — PLAN OF CARE
Goal Outcome Evaluation:      Plan of Care Reviewed With: patient    Overall Patient Progress: no changeOverall Patient Progress: no change       VS: BP (!) 161/59 (BP Location: Right arm, Patient Position: Supine, Cuff Size: Adult Regular)   Pulse 70   Temp 97.7  F (36.5  C) (Oral)   Resp 18   LMP  (LMP Unknown)   SpO2 92%     O2: > 92% on 2L via NC, reports some SOB, no CP  Pulse ox on to monitor O2 after pain medications   Output: Voids spontaneosult and adequately via bedside commode   Last BM:    Activity: Ax1 with walker, gait belt    Skin: Insulin pump and sensor to stomach  Scab to R shin    Pain: 10/10 pain to RLE, pain managed with PRN medications    CMS: AO x4, reports numbness to RLE   Dressing: RLE splinted and ace wrapped    Diet: NPO   LDA: L PIV SL   Equipment: IV pole, call light, walker, gait belt and personal belongings    Plan: Possible OR today    Additional Info: Pt needs a CT/ x-rays done this morning prior to surgery   Administered pain medications prior to re splinting this morning for pain. Under direction of resident who was at bedside, charge nurse also consulted  Continuous pulse ox to monitor O2        5 ortho Admission Note    Reason for admission: R tibia fracture  Primary team notified of pt arrival.  Admitted from: [facility]  Via: Ambulance  Accompanied by: Self  Belongings: Placed in closet; valuables sent home with family  Admission Required Doc Completed: No  Mobility Devices Utilized by Patient Provided (i.e. walker, wheelchair, etc.): Yes  Teaching: Orientation to unit and call light- call light within reach, use of console, meal times, when to call for the RN, and enforced importance of safety.  IV Access: R PIV SL  Telemetry:No  Ht./Wt.: Completed  Code Status verified on armband: Yes  2 RN Skin Assessment Completed with: Khalida AMAYA RN  Suction/Ambu bag/Flowmeter at bedside: Yes    Pt status:     Temp:  [97.7  F (36.5  C)] 97.7  F (36.5  C)  Pulse:  [70] 70  Resp:   [18] 18  BP: (161)/(59) 161/59  SpO2:  [92 %] 92 %

## 2025-07-13 NOTE — ANESTHESIA PROCEDURE NOTES
Adductor canal Procedure Note    Pre-Procedure   Staff -        Anesthesiologist:  Wong Stephenson MD       Performed By: anesthesiologist       Location: pre-op       Procedure Start/Stop Times: 7/13/2025 2:53 PM and 7/13/2025 2:59 PM       Pre-Anesthestic Checklist: patient identified, IV checked, site marked, risks and benefits discussed, informed consent, monitors and equipment checked, pre-op evaluation, at physician/surgeon's request and post-op pain management  Timeout:       Correct Patient: Yes        Correct Procedure: Yes        Correct Site: Yes        Correct Position: Yes        Correct Laterality: Yes        Site Marked: Yes  Procedure Documentation  Procedure: Adductor canal         Laterality: right       Patient Position: supine       Patient Prep/Sterile Barriers: sterile gloves, mask       Skin prep: Chloraprep       Needle Type: short bevel       Needle Gauge: 21.        Needle Length (millimeters): 100        Ultrasound guided       1. Ultrasound was used to identify targeted nerve, plexus, vascular marker, or fascial plane and place a needle adjacent to it in real-time.       2. Ultrasound was used to visualize the spread of anesthetic in close proximity to the above referenced structure.       3. A permanent image is entered into the patient's record.       4. The visualized anatomic structures appeared normal.       5. There were no apparent abnormal pathologic findings.    Assessment/Narrative         The placement was negative for: blood aspirated, painful injection and site bleeding       Paresthesias: No.       Bolus given via needle..        Secured via.        Insertion/Infusion Method: Single Shot       Complications: none       Injection made incrementally with aspirations every 5 mL.    Medication(s) Administered   Bupivacaine 0.25% w/ 1:200K Epi (Injection) - Injection   20 mL - 7/13/2025 2:53:00 PM  Medication Administration Time: 7/13/2025 2:53 PM      FOR [facility]  "Bank) ONLY:   Pain Team Contact information: please page the Pain Team Via [facility]. Search \"Pain\". During daytime hours, please page the attending first. At night please page the resident first.      "

## 2025-07-13 NOTE — ANESTHESIA PROCEDURE NOTES
Sciatic Procedure Note    Pre-Procedure   Staff -        Anesthesiologist:  Wong Stephenson MD       Performed By: anesthesiologist       Location: pre-op       Procedure Start/Stop Times: 7/13/2025 3:06 PM and 7/13/2025 3:00 PM       Pre-Anesthestic Checklist: patient identified, IV checked, site marked, risks and benefits discussed, informed consent, monitors and equipment checked, pre-op evaluation, at physician/surgeon's request and post-op pain management  Timeout:       Correct Patient: Yes        Correct Procedure: Yes        Correct Site: Yes        Correct Position: Yes        Correct Laterality: Yes        Site Marked: Yes  Procedure Documentation  Procedure: Sciatic         Laterality: right       Patient Position: supine       Patient Prep/Sterile Barriers: sterile gloves, mask       Skin prep: Chloraprep (popliteal approach).       Needle Type: short bevel       Needle Gauge: 21.        Needle Length (millimeters): 100        Ultrasound guided       1. Ultrasound was used to identify targeted nerve, plexus, vascular marker, or fascial plane and place a needle adjacent to it in real-time.       2. Ultrasound was used to visualize the spread of anesthetic in close proximity to the above referenced structure.       3. A permanent image is entered into the patient's record.       4. The visualized anatomic structures appeared normal.       5. There were no apparent abnormal pathologic findings.    Assessment/Narrative         The placement was negative for: blood aspirated, painful injection and site bleeding       Paresthesias: No.       Bolus given via needle..        Secured via.        Insertion/Infusion Method: Single Shot       Complications: none       Injection made incrementally with aspirations every 5 mL.    Medication(s) Administered   Bupivacaine 0.25% w/ 1:200K Epi (Injection) - Injection   20 mL - 7/13/2025 3:06:00 PM  Dexamethasone 10 mg/mL PF (Perineural) - Perineural   2 mg -  "7/13/2025 3:06:00 PM  Medication Administration Time: 7/13/2025 3:06 PM      FOR [facility] ONLY:   Pain Team Contact information: please page the Pain Team Via [employer]. Search \"Pain\". During daytime hours, please page the attending first. At night please page the resident first.      "

## 2025-07-13 NOTE — OP NOTE
Orthopaedic Surgery Op-Note     Patient: Divina Delgadillo  : 1985  Date of Service: 2025 6:00 PM    Pre-operative Diagnosis:   Comminuted distal 1/3 spiral fracture of right tibia diaphysis with extension into the tibial plafond  Segmental fracture of right fibula with proximal maisonneuve fracture and comminuted distal fibula fracture    Post-operative Diagnosis: SAME    Procedure(s) Performed:   Open reduction internal fixation of right tibia fracture with intramedullary ulysses fixation  Open reduction internal fixation of right fibular fracture with distal fibula plate and screw construct    Staff: Dr. Veto Bain MD  Surgical Assistant: Maribeth Leyva MD, Wong Calzada MD, Israel Bang MS4      Implants:   Tibia: Crys T2 nail 9 mm x 315 mm; 2x proximal cross lock screws; 2x distal cross lock screws; 5.0 x 36 mm cannulated screw for reduction and fixation of coronal split of distal tibia extending into the ankle joint  Fibula: Crys 4 hole distal lateral fibular plate     Intra-op Labs/Cxs/Specimens:   * No specimens in log *    Indication for Procedure:   Patient is a 40 year old female with ground level fall resulting in the above injuries requiring surgical reduction and stabilization to restore length, alignment and rotation.     Description of Procedure:   On the day of surgery I met the patient in the preoperative holding area.  We reviewed the surgical plan. I answered the patient's questions to the best of my ability. Site was marked and consent forms were signed by the patient and I.  OR and Anesthesia team were briefed. Patient was taken to the OR and general anesthesia administered with endotracheal intubation. IV antibiotics and tranexamic acid were administered prior to incision. Patient was positioned in the supine position.  The surgical site was prepped and draped in sterile fashion. Timeout was performed.    We started with an anterior to posterior screw to reduce and  stabilize the coronal split at the tibial plafond. Under fluoroscopy a smooth pin was placed from anterior tibia to posteromedial cortex. We drilled over the pin and measured with a depth guide selecting a 40 mm screw to engage the dorsal cortex. A partially threaded screw was inserted over the smooth wire and we were able to achieve good compression across the coronal split, however this resulted in the screw being several millimeters proud dorsally.  We moved on to ORIF tibia fracture.     Infrapatellar incision was made and patella tendon split in the midline. Guide wire was inserted under fluoroscopy and the opening reamer used to create a corticotomy in the center of the canal on the AP. A ball tipped guide wire was advanced down to the fracture line on fluoro. We then reduced the fracture with traction, lateral translation and valgus stress. The wire was then impacted down to the physeal scar under fluoro. We then reamed over the wire up to 10.5 mm.  A 9.5 mm x 315 nail was inserted over the nail. The reduction did displace as we first attempted to cross the fracture so the nail was backed up slightly and the fracture reduced again. The nail then passed through the fracture with retention of the anatomic reduction.  After the nail was down we removed the guide wire and inserted two proximal cross locking screws. We then inserted two distal cross locking screws.  At this point we removed the AP screw and shortened it to a 36 mm screw which was a much better length.      We then moved on to the distal fibula to stabilize the ankle mortise.  Lateral incision was made over the distal fibula and the fibula exposed. Given the patient's poor bone I elected to use a distal fibula lateral plate rather than a 1/3 tubular plate.  The distal fibular was reduced on fluoro and provisionally stabilized with k wires. A 4 hole lateral distal fibular plate was then placed and secured with K wires. We sequentially drilled and  inserted locking screws distally. The proximal plate was secured with cortical screws. We then performed a manual external rotation stress test which did not show any syndosmotic instability distally. Based upon this I elected to forego any syndesmotic fixation.      Wound was thoroughly irrigated with sterile saline throughout the case. Hemostasis was achieved with bipolar electrocautery. The incisions were closed in layers. The infrapatellar incision ws closed with monocryl and prineo. The distal incisions were closed with nylon. Bandages were applied followed by a posterior slab splint.  Patient emerged from general anesthesia and was extubated. They were taken to the PACU in stable condition.     All sponge and needle counts were correct x 2.  I was present and scrubbed for the entire procedure.     Estimated blood loss 200 mL    Post op plan:   Ortho Spine Primary team  Activity: Up with assist until independent. Avoid repetitive lifting, bending, twisting  Weight bearing status: No weight bearing on right lower extremity  Pain management: Oral pain medications with IV for breakthrough. Wean IV as able.    Antibiotics: Ancef x 24 hours  Diet: Begin with clear fluids and progress diet as tolerated. Fabricio nutritional supplement twice daily  DVT prophylaxis: Mechanical prophylaxis with SCD  Imaging: radiographs in pacu  Labs: Monitor Hgb   Bracing/Splinting: short leg splint  Dressings: Keep clean, dry and intact. reinforce as necessary for strikethrough or saturation.   Physical Therapy/Occupational Therapy: Eval and treat.  Consults: IM, ETTA.  Follow-up: Clinic 2-3 weeks for suture removal  Disposition: Admitted for physical therapy and pain control    Veto Bain MD  Orthopedic Spine Surgeon  , Department of Orthopedic Surgery

## 2025-07-13 NOTE — ANESTHESIA PREPROCEDURE EVALUATION
Anesthesia Pre-Procedure Evaluation    Patient: Divina Delgadillo   MRN: [id number] : 1985          Procedure : Procedure(s):  Right Tibia Intramedullary nail tibia fracture, Possible Open Reduction Internal Fixation Right Tibia  Possible Open reduction internal fixation fibula         Past Medical History:   Diagnosis Date    Acquired asplenia     Acute pain of left knee 2019    Acute-on-chronic kidney injury 2019    ARF (acute renal failure) 2019    Asthma     Bipolar disorder (H)     Cardiac murmur     Cervical high risk HPV (human papillomavirus) test positive 2017    Chiari malformation type I (H)     Chronic bilateral low back pain without sciatica     Deep venous thrombosis of arm (H) 2017    ultrasound 2017-  venous thrombus in the antecubital fossa region and the left forearm as described    Depressive disorder     DVT (deep venous thrombosis) (H)     DVT of upper extremity (deep vein thrombosis) (H)     Esophageal reflux     GERD    Hypertension     Insomnia     Leukocytosis     Mild intermittent asthma     Morbid obesity (H)     Mucinous neoplasm of pancreas     NAFL (nonalcoholic fatty liver)     Neck pain     Nicotine abuse     Other specified types of schizophrenia, unspecified condition     Schizoaffective disorder, depressive type (H)     Stage 3a chronic kidney disease (CKD) (H)     Type 2 diabetes mellitus (H)     Ulnar neuropathy of both upper extremities     Vitamin D insufficiency       Past Surgical History:   Procedure Laterality Date    ADRENALECTOMY Left 2015    BIOPSY      BREAST SURGERY  2013    COLONOSCOPY      ENT SURGERY  10/01/2000    Tympanoplasty    IR LIVER BIOPSY PERCUTANEOUS  2019    PANCREATECTOMY PARTIAL  2015    PERCUTANEOUS BIOPSY LIVER Right 2019    Procedure: Percutaneous Liver Biopsy;  Surgeon: Sean Guzman PA-C;  Location: [facility] OR    SPLENECTOMY  2015    TONSILLECTOMY  10/01/2000    Tonsillectomy       Allergies   Allergen Reactions    Gabapentin Other (See Comments)     Interfered with psych meds causing angry episode    Oxycodone-Acetaminophen Other (See Comments)     weston Bustillos      Social History     Tobacco Use    Smoking status: Former     Current packs/day: 0.00     Average packs/day: 0.5 packs/day for 1.5 years (0.8 ttl pk-yrs)     Types: Cigarettes     Start date: 2023     Quit date: 2025     Years since quittin.1     Passive exposure: Yes    Smokeless tobacco: Never    Tobacco comments:     E- cig and cigarettes (2-3 cigarettes per day)   Substance Use Topics    Alcohol use: No     Comment: sober since       Wt Readings from Last 1 Encounters:   25 104.3 kg (230 lb)        Anesthesia Evaluation            ROS/MED HX  ENT/Pulmonary:     (+)                      asthma                  Neurologic: Comment: Chiari type I    (+)    peripheral neuropathy,                            Cardiovascular:     (+) Dyslipidemia hypertension- -   -  - -                                      METS/Exercise Tolerance:     Hematologic:       Musculoskeletal:       GI/Hepatic:     (+) GERD,            liver disease,       Renal/Genitourinary:       Endo:     (+)  type II DM,             Obesity,       Psychiatric/Substance Use:     (+) psychiatric history bipolar       Infectious Disease:       Malignancy:   (+) Malignancy, History of Other.GI CA  Remission status post.  Other CA pancreatic status post Surgery.    Other:              Physical Exam  Airway  Mallampati: II  TM distance: >3 FB  Neck ROM: full  Mouth opening: >= 4 cm    Cardiovascular   Rhythm: regular  Rate: normal rate     Dental   (+) Edentulous      Pulmonary Breath sounds clear to auscultation        Neurological   She appears awake, alert and oriented x3.    Other Findings       OUTSIDE LABS:  CBC:   Lab Results   Component Value Date    WBC 14.8 (H) 2025    WBC 14.6 (H) 2025    HGB 11.2 (L) 2025    HGB 10.8 (L)  07/12/2025    HCT 36.8 07/13/2025    HCT 36.1 07/12/2025     07/13/2025     07/12/2025     BMP:   Lab Results   Component Value Date     07/13/2025     07/12/2025    POTASSIUM 4.3 07/13/2025    POTASSIUM 4.9 07/12/2025    CHLORIDE 104 07/13/2025    CHLORIDE 104 07/12/2025    CO2 25 07/13/2025    CO2 25 07/12/2025    BUN 28.9 (H) 07/13/2025    BUN 31.0 (H) 07/12/2025    CR 1.73 (H) 07/13/2025    CR 1.86 (H) 07/12/2025     (H) 07/13/2025    GLC 93 07/13/2025     COAGS:   Lab Results   Component Value Date    PTT 27 07/25/2024    INR 1.02 07/13/2025     POC:   Lab Results   Component Value Date     (H) 05/29/2020    HCG Negative 04/07/2025    HCGS Negative 05/25/2020     HEPATIC:   Lab Results   Component Value Date    ALBUMIN 3.9 07/01/2025    PROTTOTAL 7.9 07/01/2025    ALT 24 07/01/2025    AST 20 07/01/2025     (H) 11/27/2024    ALKPHOS 196 (H) 07/01/2025    BILITOTAL 0.2 07/01/2025     OTHER:   Lab Results   Component Value Date    A1C 8.3 (H) 06/18/2025    CLAY 9.0 07/13/2025    PHOS 4.4 02/10/2025    MAG 1.9 11/29/2007    LIPASE 58 02/02/2025    AMYLASE 33 11/27/2024    TSH 1.25 07/01/2025    CRP 5.4 08/01/2018    SED 55 (H) 07/30/2024       Anesthesia Plan    ASA Status:  3      NPO Status: NPO Appropriate   Anesthesia Type: General.  Airway: oral.  Induction: intravenous.  Maintenance: TIVA.   Techniques and Equipment:       - Monitoring Plan: standard ASA monitoring     Consents    Anesthesia Plan(s) and associated risks, benefits, and realistic alternatives discussed. Questions answered and patient/representative(s) expressed understanding.     - Discussed: CRNA     - Discussed with:  Patient        - Pt is DNR/DNI Status: no DNR     Blood Consent:      - Discussed with: not discussed.     Postoperative Care    Pain management: non-narcotic analgesics, plan for postoperative opioid use, peripheral nerve block.     Comments:                   Wong Stpehenson,  "MD    I have reviewed the pertinent notes and labs in the chart from the past 30 days and (re)examined the patient.  Any updates or changes from those notes are reflected in this note.    Clinically Significant Risk Factors Present on Admission                   # Hypertension: Noted on problem list          # DMII: A1C = 8.3 % (Ref range: 0.0 - 5.6 %) within past 6 months   # Obesity: Estimated body mass index is 38.27 kg/m  as calculated from the following:    Height as of 7/12/25: 1.651 m (5' 5\").    Weight as of 7/12/25: 104.3 kg (230 lb).       # Asthma: noted on problem list              "

## 2025-07-13 NOTE — PLAN OF CARE
VS: BP (!) 156/64   Pulse 72   Temp 97.9  F (36.6  C) (Oral)   Resp 18   LMP  (LMP Unknown)   SpO2 94%      O2: Sating >95% on RA, denies SOB/Chest pain   Output: Voids spontaneously using bedside commode.    Last BM: 7/12   Activity: Up with assist 1 using a walker, RLE NWB.    Skin: All visible skin intact ex for scab right shin    Pain: Pain was managed with PRN oxycodone, atarax and scheduled robaxin and tylenol   CMS: AO x4, reports numbness RLE   Dressing: RLE with ace wrap   Diet: NPO since midnight, BG 93.    LDA: PIV to left arm, saline locked.    Plan: Pt went to OR at about 1.50 pm for surgery.    Additional Info: CT and x-ray done, see results.  Pt has an insulin pump and sensor, pump disconnected by pt and placed in pt belongings.

## 2025-07-13 NOTE — LETTER
Recipient:  [facility] [place]          Sender: Devi Martel, SHERIE  [phone]          Date: July 16, 2025  Patient Name:  Divina Delgadillo  Patient YOB: 1985  Routing Message:  Discharge orders for [facility]  Hard script previously faxed.        The documents accompanying this notice contain confidential information belonging to the sender.  This information is intended only for the use of the individual or entity named above.  The authorized recipient of this information is prohibited from disclosing this information to any other party and is required to destroy the information after its stated need has been fulfilled, unless otherwise required by state law.    If you are not the intended recipient, you are hereby notified that any disclosure, copy, distribution or action taken in reliance on the contents of these documents is strictly prohibited.  If you have received this document in error, please return it by fax to [phone] with a note on the cover sheet explaining why you are returning it (e.g. not your patient, not your provider, etc.).  If you need further assistance, please call [facility] Centralized Transcription at [phone].  Documents may also be returned by mail to [employer], , [facility] [address], [place], [place] [address].

## 2025-07-13 NOTE — H&P
[facility]  Orthopedic Surgery H&P    Name: Divina Delgadillo  Age: 40 year old  MRN: [id number]  YOB: 1985    Assessment and Plan:     Assessment:  40 year old female with a closed spiral right distal third tibial shaft fracture and proximal and distal right fibula fractures status post a ground-level fall.  Currently closed and neurovascularly intact.    Plan:  - Had a long discussion with the patient and her guardian via phone regarding operative versus nonoperative management of her fractures.  Explained the risks, benefits, and alternatives of both. All questions answered.  Patient elected to proceed with surgical fixation of the right tibia and fibula.  - Post-splint x-rays ordered  - Will obtain CT scan of the right ankle to assess for possible posterior malleolus fracture.    Operative Plan: Right tibia IMN, ORIF right fibula on 7/13 pending surgeon/OR availability   - NPO    - Labs: CBC, BMP, INR/PTT, Type and Screen - complete   - Hold anticoaguation morning of planned surgery   - Consent: complete   - Case request complete   - Medical clearance for surgery to be performed by Medicine Team, appreciate their assistance.    The patient was discussed with Dr. Leyva PGY4 and attending Dr. Bain.    Wong Davis, DO  Orthopaedic Surgery Resident  Pager: [phone]  July 13, 2025, 2:08 AM    Please page me directly with any questions/concerns during regular weekday hours before 5pm. If there is no response, if it is a weekend, or if it is during evening hours, then please page the orthopedic surgery resident on call.    History of Present Illness:     Patient was seen and examined by me. History, PMH, Meds, SH, complete ROS (10 organ systems) and PE reviewed with patient and prior medical records.      Divina Delgadillo is a 40 year old female with a PMHx including T2DM, CKD3 on insulin, asthma, bipolar disorder, Chiari malformation type 1, DVT left arm in  2017, depression, HTN, obesity, NAFL, pancreatic cancer  s/p distal pancreatectomy, splenectomy, left adrenalectomy 02/26/2015, schizoaffective disorder, left ankle AVN, and left femur fracture s/p IMN who presents with right leg pain. She had a syncopal/vertigo episode at home with all of her weight on her right leg and had severe pain and collapsed to the ground.  She crawled to her phone and called for help.  She presented to an outside hospital where she was found to have right tibia and fibula fractures, a splint was placed, and she was transferred to [facility] for orthopedic intervention.  She currently reports pain is controlled in the right leg.  Intermittent mild tingling in the toes.  No other injuries.  She last ate yesterday.  She does not take blood thinners.  She denies prior injury or surgery to the right leg.     Past Medical History:     Past Medical History:   Diagnosis Date    Acquired asplenia     Acute pain of left knee 03/22/2019    Acute-on-chronic kidney injury 03/22/2019    ARF (acute renal failure) 03/23/2019    Asthma     Bipolar disorder (H)     Cardiac murmur     Cervical high risk HPV (human papillomavirus) test positive 06/20/2017    Chiari malformation type I (H)     Chronic bilateral low back pain without sciatica     Deep venous thrombosis of arm (H) 01/06/2017    ultrasound 1/6/2017-  venous thrombus in the antecubital fossa region and the left forearm as described    Depressive disorder     DVT (deep venous thrombosis) (H)     DVT of upper extremity (deep vein thrombosis) (H) 2021    Esophageal reflux     GERD    Hypertension     Insomnia     Leukocytosis     Mild intermittent asthma     Morbid obesity (H)     Mucinous neoplasm of pancreas     NAFL (nonalcoholic fatty liver)     Neck pain     Nicotine abuse     Other specified types of schizophrenia, unspecified condition     Schizoaffective disorder, depressive type (H)     Stage 3a chronic kidney disease (CKD) (H)     Type 2  diabetes mellitus (H)     Ulnar neuropathy of both upper extremities     Vitamin D insufficiency        Past Surgical History:     Past Surgical History:   Procedure Laterality Date    ADRENALECTOMY Left 2015    BIOPSY      BREAST SURGERY  2013    COLONOSCOPY      ENT SURGERY  10/01/2000    Tympanoplasty    IR LIVER BIOPSY PERCUTANEOUS  2019    PANCREATECTOMY PARTIAL  2015    PERCUTANEOUS BIOPSY LIVER Right 2019    Procedure: Percutaneous Liver Biopsy;  Surgeon: Sean Guzman PA-C;  Location: [facility] OR    SPLENECTOMY      TONSILLECTOMY  10/01/2000    Tonsillectomy       Social History:     Social History     Socioeconomic History    Marital status: Single    Number of children: 0   Tobacco Use    Smoking status: Former     Current packs/day: 0.00     Average packs/day: 0.5 packs/day for 1.5 years (0.8 ttl pk-yrs)     Types: Cigarettes     Start date: 2023     Quit date: 2025     Years since quittin.1     Passive exposure: Yes    Smokeless tobacco: Never    Tobacco comments:     E- cig and cigarettes (2-3 cigarettes per day)   Vaping Use    Vaping status: Every Day    Substances: Nicotine, Flavoring    Devices: Disposable   Substance and Sexual Activity    Alcohol use: No     Comment: sober since     Drug use: No    Sexual activity: Yes     Partners: Female, Male     Birth control/protection: I.U.D.     Comment: Both male and female    Other Topics Concern    Parent/sibling w/ CABG, MI or angioplasty before 65F 55M? No     Social Drivers of Health     Financial Resource Strain: Low Risk  (2025)    Financial Resource Strain     Within the past 12 months, have you or your family members you live with been unable to get utilities (heat, electricity) when it was really needed?: No   Food Insecurity: Low Risk  (2025)    Food Insecurity     Within the past 12 months, did you worry that your food would run out before you got money to buy more?: No     Within the past 12  months, did the food you bought just not last and you didn t have money to get more?: No   Transportation Needs: Low Risk  (1/2/2025)    Transportation Needs     Within the past 12 months, has lack of transportation kept you from medical appointments, getting your medicines, non-medical meetings or appointments, work, or from getting things that you need?: No   Physical Activity: Inactive (1/2/2025)    Exercise Vital Sign     Days of Exercise per Week: 1 day     Minutes of Exercise per Session: 0 min   Stress: Stress Concern Present (1/2/2025)    [employer] [place] [employer] - Occupational Stress Questionnaire     Feeling of Stress : To some extent   Social Connections: Unknown (1/2/2025)    Social Connection and Isolation Panel [NHANES]     Frequency of Social Gatherings with Friends and Family: More than three times a week   Interpersonal Safety: Low Risk  (7/1/2025)    Interpersonal Safety     Do you feel physically and emotionally safe where you currently live?: Yes     Within the past 12 months, have you been hit, slapped, kicked or otherwise physically hurt by someone?: No     Within the past 12 months, have you been humiliated or emotionally abused in other ways by your partner or ex-partner?: No   Housing Stability: Low Risk  (1/2/2025)    Housing Stability     Do you have housing? : Yes     Are you worried about losing your housing?: No   Tobacco denies.  Alcohol: Sober for 3 years  Illicit drugs: Vapes  Working: currently disabled  Living: In an apartment alone, no steps in the home.    Family History:     Family History   Problem Relation Age of Onset    Respiratory Mother         ASTHMA    Allergies Mother     Depression Mother     Chronic Obstructive Pulmonary Disease Mother     Anxiety Disorder Mother     Mental Illness Mother     Asthma Mother     Heart Disease Father         HEART VALVE REPLACEMENT    Hypertension Father     Diabetes Father     Depression Father     Anxiety Disorder  Father     Mental Illness Father     Obesity Father     Respiratory Sister     Allergies Sister     Depression Sister     Respiratory Sister     Allergies Sister     Depression Sister     Respiratory Brother     Allergies Brother     Sleep Apnea Maternal Grandfather     Kidney Disease No family hx of     Liver Disease No family hx of     Liver Cancer No family hx of        Medications:     Current Facility-Administered Medications   Medication Dose Route Frequency Provider Last Rate Last Admin    acetaminophen (TYLENOL) tablet 975 mg  975 mg Oral TID Wong Davis DO        bisacodyl (DULCOLAX) suppository 10 mg  10 mg Rectal Daily PRN Wong Davis DO        HYDROmorphone (DILAUDID) injection 0.2 mg  0.2 mg Intravenous Q2H PRN Wong Davis DO        Or    HYDROmorphone (DILAUDID) injection 0.4 mg  0.4 mg Intravenous Q2H PRN Wong Davis DO        methocarbamol (ROBAXIN) tablet 750 mg  750 mg Oral TID Wong Davis DO        ondansetron (ZOFRAN ODT) ODT tab 4 mg  4 mg Oral Q6H PRN Wong Davis DO        Or    ondansetron (ZOFRAN) injection 4 mg  4 mg Intravenous Q6H PRN Wong Davis DO        oxyCODONE (ROXICODONE) tablet 5 mg  5 mg Oral Q4H PRN Wong Davis DO        Or    oxyCODONE IR (ROXICODONE) tablet 10 mg  10 mg Oral Q4H PRN Wong Davis DO        polyethylene glycol (MIRALAX) Packet 17 g  17 g Oral Daily PRN Wong Davis DO        prochlorperazine (COMPAZINE) injection 10 mg  10 mg Intravenous Q6H PRN Wong Davis DO        Or    prochlorperazine (COMPAZINE) tablet 10 mg  10 mg Oral Q6H PRN Wong Davis DO        senna-docusate (SENOKOT-S/PERICOLACE) 8.6-50 MG per tablet 1-2 tablet  1-2 tablet Oral BID Wong Davis DO           Allergies:     Allergies   Allergen Reactions    Gabapentin Other (See Comments)     Interfered with psych meds causing angry episode    Oxycodone-Acetaminophen Other (See Comments)     weston Bustillos       Review of Systems:      A comprehensive 10 point review of systems (constitutional, ENT, cardiac, peripheral vascular, respiratory, GI, , musculoskeletal, skin, neurological) was performed and found to be negative except as described in this note.      Physical Exam:     Vital Signs: BP (!) 161/59 (BP Location: Right arm, Patient Position: Supine, Cuff Size: Adult Regular)   Pulse 70   Temp 97.7  F (36.5  C) (Oral)   Resp 18   LMP  (LMP Unknown)   SpO2 92%   General: Resting comfortably in bed, awake, alert, cooperative, no apparent distress, appears stated age.  HEENT: Normocephalic, atraumatic, EOMI, no scleral icterus.  Cardiovascular: RRR assessed by peripheral pulse.  Respiratory: Breathing comfortably on room air, no wheezing.  Skin: No rashes or lesions.  Neurologic: A&Ox3, CN II-XII grossly intact  Musculoskeletal:  BUE: No gross deformity. Skin intact. Full active/passive ROM w/o pain or crepitus. Fires deltoid, biceps, triceps, wrist extension/flexion, , EPL, intrinsics, FPL with 5/5 strength. SILT in axillary, musculocutaneous, radial, ulnar, and median nerve distribution. Radial pulse 2+ and fingers warm and well-perfused with <2 seconds capillary refill.  RLE: Short leg splint in place.  This was removed for examination.  There is a small superficial abrasion on the anterior medial distal shin. Compartment soft and compressible. No open wounds.  Limited ROM of the right lower extremity due to pain throughout the leg.  Wiggles toes. Fires TA/Gastroc/EHL/FHL. SILT in femoral, sural, saphenous, deep peroneal, superficial peroneal, and tibial nerve distributions.  Intermittent paresthesias in the toes.  Dorsalis pedis and posterior tibial arteries 2+ and foot warm and well-perfused with <2 seconds capillary refill.  LLE: No gross deformity. Skin intact. Full active/passive ROM of all joints w/o pain or crepitus. 5/5 SLR. Fires TA/Gastroc/EHL/FHL with 5/5 strength. SILT in femoral, sural, saphenous, deep peroneal,  superficial peroneal, and tibial nerve distributions. Dorsalis pedis and posterior tibial arteries 2+ and foot warm and well-perfused with <2 seconds capillary refill.     Imaging:     Radiographs of the right knee, tib/fib, and ankle were reviewed. These demonstrate a spiral right distal third tibial shaft fracture, a proximal fibula fracture, and distal fibula fracture.    Data:     CBC:  Lab Results   Component Value Date    WBC 14.6 (H) 07/12/2025    HGB 10.8 (L) 07/12/2025     07/12/2025     BMP:  Lab Results   Component Value Date     07/12/2025    POTASSIUM 4.9 07/12/2025    CHLORIDE 104 07/12/2025    CO2 25 07/12/2025    BUN 31.0 (H) 07/12/2025    CR 1.86 (H) 07/12/2025    ANIONGAP 12 07/12/2025    CLAY 9.3 07/12/2025     (H) 07/12/2025     Inflammatory Markers:  Lab Results   Component Value Date    WBC 14.6 (H) 07/12/2025    WBC 13.7 (H) 06/18/2025    WBC 13.3 (H) 04/01/2025    CRP 5.4 08/01/2018    CRP 6.4 05/17/2017    SED 55 (H) 07/30/2024    SED 55 (H) 08/02/2023    SED 24 (H) 08/01/2018

## 2025-07-13 NOTE — CONSULTS
[facility]  Consult Note - Hospitalist Service, GOLD TEAM 16  Date of Admission:  7/13/2025  Consult Requested by: Orthopedic surgery  Reason for Consult: Preop risk assessment, medical comanagement    Assessment & Plan   Divina Delagdillo is a 40 year old female admitted on 7/13/2025. She has a past medical history of type 2 diabetes mellitus, chronic kidney disease stage III, asthma, bipolar disorder, Chiari malformation type I, history of deep vein thrombosis in the left arm in 2017, depression, hypertension, obesity, nonalcoholic fatty liver disease, and schizoaffective disorder, presented with right leg pain following a syncopal episode at home.  Pertinent past surgical history includes pancreatic cancer treated with distal pancreatectomy, splenectomy and left adrenalectomy 2015.  Also has a history of left ankle avascular necrosis and left femur fracture treated with IMN.    The patient experienced a fall, placing all her weight on her right leg, resulting in severe pain and collapse.  The patient was initially evaluated at an outside hospital, where imaging revealed closed spiral fractures of the right distal third tibia shaft and proximal and distal right fibula.  A splint was applied and patient was transferred to weightbearing for orthopedic management.    # Right leg fracture  # Preoperative assessment  Patient presented following a fall; underwent imaging with x-ray right ankle, x-ray tib-fib, x-ray knee with findings including: an oblique mildly displaced fracture of the proximal fibula metadiaphyseal junction, spiral type displaced fracture of the distal shaft of the right fibula, and an oblique mildly displaced fracture of the lateral malleolus extending below the talar dome, with associated moderate soft tissue swelling in the mild to distal right leg and ankle, and a chronic fracture of the anterior process of the calcaneus.  Plan:  - Orthopedics  "primary, with plans to take patient to the OR for right tibia IMN, ORIF right fibular  -Multimodal pain control    # Preop risk assessment  Per RCRI, patient is at low risk for MACE, largely due to preop insulin use.  From a functional standpoint, patient's METs is definitely greater than 4.  Denies any history of CVA, no history of CAD.  She remains low risk for an intermediate risk procedure.  At this time, no further evaluation necessary    Chronic medical issues:  # Type 2 diabetes mellitus - currently on insulin pump; per documentation from her last endocrinology visit, was noted to be using about 55 units of insulin daily.  Give patient Lantus 15 units x 1 and start sliding scale with medium intensity.    # CKD stage III-baseline creatinine 1.5-1.7.  Currently closer to baseline creatinine at this time.    # Schizoaffective disorder, depression type; PTSD, insomnia, adjustment disorder - PTA medications include aripiprazole, clozapine 100 mg nightly, fluoxetine 60 mg daily, lamotrigine 100 mg nightly, ?  Quetiapine, suvorexant 10 mg nightly    # GERD-continue PTA PPI  # Migraine disorder-PT medications include amitriptyline, topiramate  # Hyperlipidemia-PTA medication includes atorvastatin  # Hypertension-PTA medication includes Bumex, Coreg 25 mg twice daily.  Hold PTA Bumex, continue Coreg with holding parameters.       Clinically Significant Risk Factors Present on Admission                   # Hypertension: Noted on problem list          # DMII: A1C = 8.3 % (Ref range: 0.0 - 5.6 %) within past 6 months   # Obesity: Estimated body mass index is 38.27 kg/m  as calculated from the following:    Height as of 7/12/25: 1.651 m (5' 5\").    Weight as of 7/12/25: 104.3 kg (230 lb).       # Asthma: noted on problem list        GERTRUDE MCRAE MD  Hospitalist Service, GOLD TEAM 16  Securely message with [email or web address] (more info)  Text page via [employer] Paging/Directory   See signed in provider for up to date coverage " information  ______________________________________________________________________      History of Present Illness   Divina Delgadillo is a 40 year old female who has a complex medical history, including type 2 diabetes mellitus, chronic kidney disease stage 3, asthma, bipolar disorder, Chiari malformation type 1, a history of deep vein thrombosis in the left arm in 2017, depression, hypertension, obesity, non-alcoholic fatty liver disease, and schizoaffective disorder, presented with right leg pain following a syncopal episode at home. She has a history of pancreatic cancer treated with distal pancreatectomy, splenectomy, and left adrenalectomy in 2015, as well as left ankle avascular necrosis and a left femur fracture treated with intramedullary nailing.    The patient experienced a fall, placing all her weight on her right leg, resulting in severe pain and collapse. She was initially evaluated at an outside hospital, where imaging revealed closed spiral fractures of the right distal third tibial shaft and proximal and distal right fibula. A splint was applied, and she was [facility] management. Currently, her pain is well-controlled, with intermittent mild tingling in the toes, and she remains neurovascularly intact. She denies any prior injury or surgery to the right leg and has not taken blood thinners.    After a thorough discussion with the patient and her guardian regarding the management options for her fractures, she opted for surgical intervention. The plan includes surgical fixation of the right tibia and fibula, with an intramedullary nail for the tibia and open reduction and internal fixation for the fibula; internal medicine consulted for medical comanagement and preop clearance.      Past Medical History    Past Medical History:   Diagnosis Date    Acquired asplenia     Acute pain of left knee 03/22/2019    Acute-on-chronic kidney injury 03/22/2019    ARF (acute  renal failure) 03/23/2019    Asthma     Bipolar disorder (H)     Cardiac murmur     Cervical high risk HPV (human papillomavirus) test positive 06/20/2017    Chiari malformation type I (H)     Chronic bilateral low back pain without sciatica     Deep venous thrombosis of arm (H) 01/06/2017    ultrasound 1/6/2017-  venous thrombus in the antecubital fossa region and the left forearm as described    Depressive disorder     DVT (deep venous thrombosis) (H)     DVT of upper extremity (deep vein thrombosis) (H) 2021    Esophageal reflux     GERD    Hypertension     Insomnia     Leukocytosis     Mild intermittent asthma     Morbid obesity (H)     Mucinous neoplasm of pancreas     NAFL (nonalcoholic fatty liver)     Neck pain     Nicotine abuse     Other specified types of schizophrenia, unspecified condition     Schizoaffective disorder, depressive type (H)     Stage 3a chronic kidney disease (CKD) (H)     Type 2 diabetes mellitus (H)     Ulnar neuropathy of both upper extremities     Vitamin D insufficiency        Past Surgical History   Past Surgical History:   Procedure Laterality Date    ADRENALECTOMY Left 2015    BIOPSY      BREAST SURGERY  2013    COLONOSCOPY      ENT SURGERY  10/01/2000    Tympanoplasty    IR LIVER BIOPSY PERCUTANEOUS  11/14/2019    PANCREATECTOMY PARTIAL  2015    PERCUTANEOUS BIOPSY LIVER Right 11/14/2019    Procedure: Percutaneous Liver Biopsy;  Surgeon: Sean Guzman PA-C;  Location: [facility] OR    SPLENECTOMY  2015    TONSILLECTOMY  10/01/2000    Tonsillectomy       Medications   I have reviewed this patient's current medications  Facility-Administered Medications Prior to Admission   Medication Dose Route Frequency Provider Last Rate Last Admin    levonorgestrel (MIRENA) 52 MG (20 mcg/day) IUD 1 each  1 each Intrauterine See Admin Instructions Irina Camejo MD   1 each at 04/07/25 1413     Medications Prior to Admission   Medication Sig Dispense Refill Last Dose/Taking     acetaminophen (TYLENOL) 500 MG tablet Take 1-2 tablets (500-1,000 mg) by mouth every 6 hours as needed for mild pain. 40 tablet 0     albuterol (VENTOLIN HFA) 108 (90 Base) MCG/ACT inhaler INHALE 2 PUFFS INTO THE LUNGS EVERY SIX  HOURS AS NEEDED FOR SHORTNESS OF BREATH 18 g 5     amitriptyline (ELAVIL) 10 MG tablet Take 1 tablet (10 mg) by mouth at bedtime. 90 tablet 1     ARIPiprazole (ABILIFY) 15 MG tablet Take 1 Tablet (15 mg) by mouth once daily in the evening.       atorvastatin (LIPITOR) 20 MG tablet Take 1 tablet (20 mg) by mouth daily 90 tablet 0     biotin (BIOTIN MAXIMUM STRENGTH) 5 MG CAPS Take 1 tablet by mouth daily 100 capsule 0     blood glucose (ACCU-CHEK GUIDE) test strip Use to test blood sugar 3 times daily or as directed. 100 strip 11     bumetanide (BUMEX) 0.5 MG tablet Take 1 tablet (0.5 mg) by mouth daily 90 tablet 3     carvedilol (COREG) 12.5 MG tablet Take 1 tablet (12.5 mg) by mouth 2 times daily 60 tablet 11     cloZAPine (CLOZARIL) 50 MG tablet Take 100 mg by mouth at bedtime.       docusate sodium (COLACE) 100 MG capsule Take 1 capsule (100 mg) by mouth 2 times daily as needed for constipation. 60 capsule 5     FLUoxetine (PROZAC) 20 MG capsule Take 3 Capsules (60 mg) by mouth once daily in the morning.       glucose (BD GLUCOSE) 4 g chewable tablet Take 1 tablet by mouth every hour as needed for low blood sugar 30 tablet 4     HYDROcodone-acetaminophen (NORCO) 5-325 MG tablet Take 1-2 tablets by mouth every 6 hours as needed for severe pain. 10 tablet 0     hydrOXYzine HCl (ATARAX) 25 MG tablet Take 25 mg by mouth 3 times daily as needed for anxiety.       insulin aspart (NOVOLOG VIAL) 100 UNITS/ML vial for use with continuous insulin pump Max TDD 80 20 mL 14     Insulin Infusion Pump (MINIMED 780G INSULIN PUMP) KIT 1 each daily SmartGuard Target: 100; Active Insulin Time: 2 hours (recommended); SmartGuardTM Warmup/Manual Mode: Suspend before low (recommended) 1 kit 0     Insulin  "Infusion Pump Supplies (EXTENDED INFUSION SET 23\"/6MM) MISC 1 each every 7 days Max Total Daily Dose 70 units; Change infusion set & reservoir every 7 days (recommended) 10 each 6     Insulin Infusion Pump Supplies (EXTENDED RESERVOIR 3ML) MISC 1 each every 7 days 10 each 11     lamoTRIgine (LAMICTAL) 100 MG tablet Take 100 mg by mouth at bedtime.       levonorgestrel (MIRENA) 52 MG (20 mcg/day) IUD 1 each by Intrauterine route once.       loratadine (CLARITIN) 10 MG tablet Take 1 tablet (10 mg) by mouth every morning 30 tablet 11     LORazepam (ATIVAN) 0.5 MG tablet Take 1 tablet (0.5 mg) by mouth every 8 hours as needed (Ssevere vertigo). 12 tablet 0     metoclopramide (REGLAN) 10 MG tablet Take 1 tablet (10 mg) by mouth 4 times daily as needed (for nausea, dizziness or vertigo). 24 tablet 1     omeprazole (PRILOSEC) 20 MG DR capsule Take 1 capsule (20 mg) by mouth 2 times daily. 30 minutes before meal on empty stomach. 90 capsule 2     QUEtiapine (SEROQUEL) 25 MG tablet        Suvorexant (BELSOMRA) 10 MG tablet Take 10 mg by mouth at bedtime.       topiramate (TOPAMAX) 25 MG tablet Take 1 tablet (25 mg) by mouth 2 times daily. 60 tablet 2              Physical Exam   Vital Signs: Temp: 97.7  F (36.5  C) Temp src: Oral BP: (!) 161/59 Pulse: 70   Resp: 18 SpO2: 92 % O2 Device: None (Room air)    Weight: 0 lbs 0 oz    General Appearance: Lying comfortably in bed, no acute distress or discomfort.  Respiratory: Normal work of breathing, lung sounds are clear bilaterally.  Cardiovascular: Normal S1-S2, normal heart rate  GI: Abdomen is soft, nontender nondistended  MSK: Right lower extremity in Ace wrap.    Medical Decision Making       65 MINUTES SPENT BY ME on the date of service doing chart review, history, exam, documentation & further activities per the note.      Data   ------------------------- PAST 24 HR DATA REVIEWED -----------------------------------------------    I have personally reviewed the following " data over the past 24 hrs:    14.8 (H)  \   11.2 (L)   / 413     140 104 28.9 (H) /  94   4.3 25 1.73 (H) \     INR:  1.02 PTT:  N/A   D-dimer:  N/A Fibrinogen:  N/A       Imaging results reviewed over the past 24 hrs:   Recent Results (from the past 24 hours)   XR Knee Right 1/2 Views    Narrative    EXAM: XR KNEE RIGHT 1/2 VIEWS  LOCATION: [facility]  DATE: 7/12/2025    INDICATION: Fall. Right knee pain.  COMPARISON: None.      Impression    IMPRESSION: Oblique mildly displaced fracture proximal right fibula at the proximal fibular metadiaphyseal junction. Anatomic alignment right knee. No significant joint space narrowing. No definitive distal femur, proximal tibia or patella fracture. No   sizable knee joint effusion.    NOTE: ABNORMAL REPORT    THE DICTATION ABOVE DESCRIBES AN ABNORMALITY FOR WHICH FOLLOW-UP IS NEEDED.     XR Tibia and Fibula Right 2 Views    Narrative    EXAM: XR TIBIA AND FIBULA RIGHT 2 VIEWS  LOCATION: [facility]  DATE: 7/12/2025    INDICATION: Fall, proximal swelling and pain along the lateral aspect.  COMPARISON: None.      Impression    IMPRESSION: Oblique mildly displaced fracture proximal right fibula at the proximal fibular metadiaphyseal junction. Spiral type displaced distal shaft right fibula fracture. There is also a concomitant distal fibula fracture at the lateral malleolus,   oblique in orientation and mildly displaced. No knee or ankle joint malalignment. Moderate mid to distal right leg and ankle soft tissue swelling. Chronic ossicle along the dorsum of the right talar head related to remote trauma. More likely chronic   fracture at the anterior process calcaneus.    NOTE: ABNORMAL REPORT    THE DICTATION ABOVE DESCRIBES AN ABNORMALITY FOR WHICH FOLLOW-UP IS NEEDED.    XR Ankle Right G/E 3 Views    Narrative    EXAM: XR ANKLE RIGHT G/E 3 VIEWS  LOCATION: [facility]  DATE:  7/12/2025    INDICATION: Fall. Ankle fracture.  COMPARISON: Tibia fibula radiographic exam today.      Impression    IMPRESSION: Redemonstrated is a spiral type displaced distal shaft right tibia fracture. In addition, there is an oblique mildly displaced fracture of the lateral malleolus extending below the level of the talar dome. No definitive medial or posterior   malleolus fracture. Distal leg and ankle soft tissue swelling. Borderline widening of the medial ankle clear space. More likely chronic fracture anterior process calcaneus. No ankle or hindfoot joint malalignment.   XR Tibia & Fibula Port Right 2 Views    Narrative    EXAM: XR TIBIA and FIBULA PORT RIGHT 2 VIEWS  LOCATION: [facility]  DATE: 7/13/2025    INDICATION: Status post splint application.  COMPARISON: None.      Impression    IMPRESSION: Acute oblique moderately displaced fracture of the distal tibial metadiaphyseal junction with slight lateral displacement of the distal tibial fracture fragment. Acute oblique mildly displaced fracture of the proximal fibular shaft.

## 2025-07-13 NOTE — ANESTHESIA PROCEDURE NOTES
Airway       Patient location during procedure: OR       Procedure Start/Stop Times: 7/13/2025 3:31 PM  Staff -        CRNA: Bhavna Chun APRN CRNA       Performed By: CRNAIndications and Patient Condition       Indications for airway management: jerome-procedural       Induction type:intravenous       Mask difficulty assessment: 1 - vent by mask    Final Airway Details       Final airway type: endotracheal airway       Successful airway: ETT - single  Endotracheal Airway Details        ETT size (mm): 7.0       Cuffed: yes       Inital cuff pressure (cm H2O): 15       Successful intubation technique: direct laryngoscopy       DL Blade Type: MAC 3       Grade View of Cords: 1       Adjucts: stylet       Position: Right       Measured from: lips       Secured at (cm): 20       Bite block used: None    Post intubation assessment        Placement verified by: capnometry, equal breath sounds and chest rise        Number of attempts at approach: 1       Number of other approaches attempted: 0       Secured with: tape       Ease of procedure: easy       Dentition: Intact and Unchanged    Medication(s) Administered   Medication Administration Time: 7/13/2025 3:31 PM

## 2025-07-14 LAB
ACANTHOCYTES BLD QL SMEAR: SLIGHT
BASOPHILS # BLD AUTO: 0.1 10E3/UL (ref 0–0.2)
BASOPHILS # BLD AUTO: 0.1 10E3/UL (ref 0–0.2)
BASOPHILS NFR BLD AUTO: 0 %
BASOPHILS NFR BLD AUTO: 0 %
EOSINOPHIL # BLD AUTO: 0 10E3/UL (ref 0–0.7)
EOSINOPHIL # BLD AUTO: 0.1 10E3/UL (ref 0–0.7)
EOSINOPHIL NFR BLD AUTO: 0 %
EOSINOPHIL NFR BLD AUTO: 0 %
FRAGMENTS BLD QL SMEAR: SLIGHT
GLUCOSE BLDC GLUCOMTR-MCNC: 240 MG/DL (ref 70–99)
GLUCOSE BLDC GLUCOMTR-MCNC: 262 MG/DL (ref 70–99)
GLUCOSE BLDC GLUCOMTR-MCNC: 300 MG/DL (ref 70–99)
GLUCOSE BLDC GLUCOMTR-MCNC: 344 MG/DL (ref 70–99)
GLUCOSE BLDC GLUCOMTR-MCNC: 350 MG/DL (ref 70–99)
HGB BLD-MCNC: 9.9 G/DL (ref 11.7–15.7)
HOLD SPECIMEN: NORMAL
HOLD SPECIMEN: NORMAL
IMM GRANULOCYTES # BLD: 0.1 10E3/UL
IMM GRANULOCYTES # BLD: 0.1 10E3/UL
IMM GRANULOCYTES NFR BLD: 0 %
IMM GRANULOCYTES NFR BLD: 1 %
LYMPHOCYTES # BLD AUTO: 1.7 10E3/UL (ref 0.8–5.3)
LYMPHOCYTES # BLD AUTO: 2.6 10E3/UL (ref 0.8–5.3)
LYMPHOCYTES NFR BLD AUTO: 11 %
LYMPHOCYTES NFR BLD AUTO: 15 %
MCV RBC AUTO: 89 FL (ref 78–100)
MCV RBC AUTO: 90 FL (ref 78–100)
MCV RBC AUTO: 90 FL (ref 78–100)
MONOCYTES # BLD AUTO: 1.2 10E3/UL (ref 0–1.3)
MONOCYTES # BLD AUTO: 1.7 10E3/UL (ref 0–1.3)
MONOCYTES NFR BLD AUTO: 10 %
MONOCYTES NFR BLD AUTO: 7 %
NEUTROPHILS # BLD AUTO: 12.5 10E3/UL (ref 1.6–8.3)
NEUTROPHILS # BLD AUTO: 13 10E3/UL (ref 1.6–8.3)
NEUTROPHILS NFR BLD AUTO: 73 %
NEUTROPHILS NFR BLD AUTO: 81 %
NRBC # BLD AUTO: 0 10E3/UL
NRBC # BLD AUTO: 0 10E3/UL
NRBC BLD AUTO-RTO: 0 /100
NRBC BLD AUTO-RTO: 0 /100
PLAT MORPH BLD: ABNORMAL
POLYCHROMASIA BLD QL SMEAR: SLIGHT
RBC MORPH BLD: ABNORMAL
TARGETS BLD QL SMEAR: SLIGHT
WBC # BLD AUTO: 16 10E3/UL (ref 4–11)
WBC # BLD AUTO: 17.1 10E3/UL (ref 4–11)

## 2025-07-14 PROCEDURE — 99255 IP/OBS CONSLTJ NEW/EST HI 80: CPT | Performed by: CLINICAL NURSE SPECIALIST

## 2025-07-14 PROCEDURE — 250N000013 HC RX MED GY IP 250 OP 250 PS 637: Performed by: INTERNAL MEDICINE

## 2025-07-14 PROCEDURE — 120N000002 HC R&B MED SURG/OB UMMC

## 2025-07-14 PROCEDURE — 85004 AUTOMATED DIFF WBC COUNT: CPT | Performed by: STUDENT IN AN ORGANIZED HEALTH CARE EDUCATION/TRAINING PROGRAM

## 2025-07-14 PROCEDURE — 250N000012 HC RX MED GY IP 250 OP 636 PS 637: Performed by: INTERNAL MEDICINE

## 2025-07-14 PROCEDURE — 99232 SBSQ HOSP IP/OBS MODERATE 35: CPT | Performed by: INTERNAL MEDICINE

## 2025-07-14 PROCEDURE — 250N000013 HC RX MED GY IP 250 OP 250 PS 637

## 2025-07-14 PROCEDURE — 36415 COLL VENOUS BLD VENIPUNCTURE: CPT | Performed by: STUDENT IN AN ORGANIZED HEALTH CARE EDUCATION/TRAINING PROGRAM

## 2025-07-14 PROCEDURE — 250N000011 HC RX IP 250 OP 636

## 2025-07-14 PROCEDURE — 250N000011 HC RX IP 250 OP 636: Performed by: SURGERY

## 2025-07-14 PROCEDURE — 36415 COLL VENOUS BLD VENIPUNCTURE: CPT

## 2025-07-14 PROCEDURE — 250N000013 HC RX MED GY IP 250 OP 250 PS 637: Performed by: SURGERY

## 2025-07-14 PROCEDURE — 85018 HEMOGLOBIN: CPT

## 2025-07-14 RX ORDER — HYDROMORPHONE HYDROCHLORIDE 4 MG/1
4 TABLET ORAL
Refills: 0 | Status: DISCONTINUED | OUTPATIENT
Start: 2025-07-14 | End: 2025-07-14

## 2025-07-14 RX ORDER — HYDROMORPHONE HYDROCHLORIDE 2 MG/1
2 TABLET ORAL
Refills: 0 | Status: DISCONTINUED | OUTPATIENT
Start: 2025-07-14 | End: 2025-07-14

## 2025-07-14 RX ORDER — HYDROMORPHONE HYDROCHLORIDE 2 MG/1
2 TABLET ORAL EVERY 4 HOURS PRN
Refills: 0 | Status: DISCONTINUED | OUTPATIENT
Start: 2025-07-14 | End: 2025-07-16 | Stop reason: HOSPADM

## 2025-07-14 RX ADMIN — INSULIN GLARGINE 30 UNITS: 100 INJECTION, SOLUTION SUBCUTANEOUS at 14:47

## 2025-07-14 RX ADMIN — Medication 1 MG: at 11:52

## 2025-07-14 RX ADMIN — HYDROMORPHONE HYDROCHLORIDE 0.4 MG: 0.2 INJECTION, SOLUTION INTRAMUSCULAR; INTRAVENOUS; SUBCUTANEOUS at 18:02

## 2025-07-14 RX ADMIN — METHOCARBAMOL 750 MG: 750 TABLET ORAL at 20:22

## 2025-07-14 RX ADMIN — TOPIRAMATE 25 MG: 25 TABLET, FILM COATED ORAL at 08:31

## 2025-07-14 RX ADMIN — FLUOXETINE HYDROCHLORIDE 60 MG: 40 CAPSULE ORAL at 08:28

## 2025-07-14 RX ADMIN — AMITRIPTYLINE HYDROCHLORIDE 10 MG: 10 TABLET, COATED ORAL at 21:41

## 2025-07-14 RX ADMIN — TOPIRAMATE 25 MG: 25 TABLET, FILM COATED ORAL at 20:22

## 2025-07-14 RX ADMIN — HYDROMORPHONE HYDROCHLORIDE 2 MG: 2 TABLET ORAL at 16:37

## 2025-07-14 RX ADMIN — CEFAZOLIN SODIUM 2 G: 2 SOLUTION INTRAVENOUS at 00:12

## 2025-07-14 RX ADMIN — CARVEDILOL 12.5 MG: 12.5 TABLET, FILM COATED ORAL at 18:02

## 2025-07-14 RX ADMIN — METHOCARBAMOL 750 MG: 750 TABLET ORAL at 08:28

## 2025-07-14 RX ADMIN — PANTOPRAZOLE SODIUM 40 MG: 40 TABLET, DELAYED RELEASE ORAL at 20:21

## 2025-07-14 RX ADMIN — QUETIAPINE FUMARATE 25 MG: 25 TABLET ORAL at 21:42

## 2025-07-14 RX ADMIN — LAMOTRIGINE 100 MG: 100 TABLET ORAL at 21:41

## 2025-07-14 RX ADMIN — ACETAMINOPHEN 975 MG: 325 TABLET ORAL at 20:21

## 2025-07-14 RX ADMIN — SUVOREXANT 10 MG: 10 TABLET, FILM COATED ORAL at 21:42

## 2025-07-14 RX ADMIN — ACETAMINOPHEN 975 MG: 325 TABLET ORAL at 08:28

## 2025-07-14 RX ADMIN — ATORVASTATIN CALCIUM 20 MG: 20 TABLET, FILM COATED ORAL at 08:31

## 2025-07-14 RX ADMIN — INSULIN ASPART 1 UNITS: 100 INJECTION, SOLUTION INTRAVENOUS; SUBCUTANEOUS at 18:12

## 2025-07-14 RX ADMIN — LORATADINE 10 MG: 10 TABLET ORAL at 08:31

## 2025-07-14 RX ADMIN — CEFAZOLIN SODIUM 2 G: 2 SOLUTION INTRAVENOUS at 16:34

## 2025-07-14 RX ADMIN — CARVEDILOL 12.5 MG: 12.5 TABLET, FILM COATED ORAL at 08:28

## 2025-07-14 RX ADMIN — METHOCARBAMOL 750 MG: 750 TABLET ORAL at 13:05

## 2025-07-14 RX ADMIN — INSULIN ASPART 8 UNITS: 100 INJECTION, SOLUTION INTRAVENOUS; SUBCUTANEOUS at 14:07

## 2025-07-14 RX ADMIN — CLOZAPINE 100 MG: 100 TABLET ORAL at 21:41

## 2025-07-14 RX ADMIN — HYDROXYZINE HYDROCHLORIDE 50 MG: 50 TABLET ORAL at 13:05

## 2025-07-14 RX ADMIN — OXYCODONE HYDROCHLORIDE 10 MG: 10 TABLET ORAL at 01:36

## 2025-07-14 RX ADMIN — ACETAMINOPHEN 975 MG: 325 TABLET ORAL at 13:05

## 2025-07-14 RX ADMIN — PANTOPRAZOLE SODIUM 40 MG: 40 TABLET, DELAYED RELEASE ORAL at 08:28

## 2025-07-14 RX ADMIN — ARIPIPRAZOLE 15 MG: 5 TABLET ORAL at 08:31

## 2025-07-14 RX ADMIN — CEFAZOLIN SODIUM 2 G: 2 SOLUTION INTRAVENOUS at 08:40

## 2025-07-14 RX ADMIN — INSULIN ASPART 3 UNITS: 100 INJECTION, SOLUTION INTRAVENOUS; SUBCUTANEOUS at 08:39

## 2025-07-14 RX ADMIN — HYDROMORPHONE HYDROCHLORIDE 0.4 MG: 0.2 INJECTION, SOLUTION INTRAMUSCULAR; INTRAVENOUS; SUBCUTANEOUS at 20:30

## 2025-07-14 ASSESSMENT — ACTIVITIES OF DAILY LIVING (ADL)
ADLS_ACUITY_SCORE: 56
DEPENDENT_IADLS:: INDEPENDENT
ADLS_ACUITY_SCORE: 52
ADLS_ACUITY_SCORE: 56
ADLS_ACUITY_SCORE: 55
ADLS_ACUITY_SCORE: 56

## 2025-07-14 NOTE — PROGRESS NOTES
[facility]    Medicine Progress Note - Hospitalist Service, GOLD TEAM 16    Date of Admission:  7/13/2025    Assessment & Plan   Divina Delgadillo is a 40 year old female admitted on 7/13/2025. She has a past medical history of type 2 diabetes mellitus, chronic kidney disease stage III, asthma, bipolar disorder, Chiari malformation type I, history of deep vein thrombosis in the left arm in 2017, depression, hypertension, obesity, nonalcoholic fatty liver disease, and schizoaffective disorder, presented with right leg pain following a syncopal episode at home.  Pertinent past surgical history includes pancreatic cancer treated with distal pancreatectomy, splenectomy and left adrenalectomy 2015.  Also has a history of left ankle avascular necrosis and left femur fracture treated with IMN.    The patient experienced a fall, placing all her weight on her right leg, resulting in severe pain and collapse.  The patient was initially evaluated at an outside hospital, where imaging revealed closed spiral fractures of the right distal third tibia shaft and proximal and distal right fibula.  A splint was applied and patient was transferred to [facility] for orthopedic surgery management.  Patient has since undergone ORIF right tibia fracture with IMN ulysses fixation and ORIF of right fibula fracture with distal fibula plate and screw construct with Dr. Bain on 7/13/2025.    # Right leg fracture    Patient presented following a fall; underwent imaging with x-ray right ankle, x-ray tib-fib, x-ray knee with findings including: an oblique mildly displaced fracture of the proximal fibula metadiaphyseal junction, spiral type displaced fracture of the distal shaft of the right fibula, and an oblique mildly displaced fracture of the lateral malleolus extending below the talar dome, with associated moderate soft tissue swelling in the mild to distal right leg and ankle, and a  "chronic fracture of the anterior process of the calcaneus.  Patient underwent ORIF right tibia fracture with IMN ulysses fixation and ORIF of right fibula fracture with distal fibula plate and screw construct with Dr. Bain on 7/13/2025.  Plan:  - Postop care per orthopedic surgery.  -PT, OT consultation  -Multimodal pain control    # Leukocytosis-noted postop, likely related to stress.  Plan to continue to monitor.    Chronic medical issues:  # Type 2 diabetes mellitus -patient typically gets insulin via her insulin pump.  Currently out of NovoLog.  Patient will receive Lantus 30 units x 1 today, also receiving carb coverage with aspart 1 unit per 7 g CHO 3 times daily with meals and as needed with snacks.  She also has high intensity sliding scale insulin ordered.    # CKD stage III-baseline creatinine 1.5-1.7.  Currently closer to baseline creatinine at this time.    # Schizoaffective disorder, depression type; PTSD, insomnia, adjustment disorder - PTA medications include aripiprazole, clozapine 100 mg nightly, fluoxetine 60 mg daily, lamotrigine 100 mg nightly, ?  Quetiapine, suvorexant 10 mg nightly.  Plan to continue all PTA medications.    # GERD-continue PTA PPI  # Migraine disorder-PT medications include amitriptyline, topiramate  # Hyperlipidemia-PTA medication includes atorvastatin  # Hypertension-PTA medication includes Bumex, Coreg 25 mg twice daily.  Hold PTA Bumex, continue Coreg with holding parameters.          Diet: Regular Diet Adult    DVT Prophylaxis: Defer to primary service  Vincent Catheter: Not present  Lines: None     Cardiac Monitoring: None  Code Status: Full Code      Clinically Significant Risk Factors                   # Hypertension: Noted on problem list           # DMII: A1C = 8.3 % (Ref range: 0.0 - 5.6 %) within past 6 months   # Obesity: Estimated body mass index is 38.27 kg/m  as calculated from the following:    Height as of 7/12/25: 1.651 m (5' 5\").    Weight as of 7/12/25: " 104.3 kg (230 lb)., PRESENT ON ADMISSION     # Asthma: noted on problem list        Social Drivers of Health    Tobacco Use: Medium Risk (7/1/2025)    Patient History     Smoking Tobacco Use: Former     Smokeless Tobacco Use: Never     Passive Exposure: Yes   Physical Activity: Inactive (1/2/2025)    Exercise Vital Sign     Days of Exercise per Week: 1 day     Minutes of Exercise per Session: 0 min   Stress: Stress Concern Present (1/2/2025)    [employer] [place] [employer] - Occupational Stress Questionnaire     Feeling of Stress : To some extent   Social Connections: Unknown (1/2/2025)    Social Connection and Isolation Panel [NHANES]     Frequency of Social Gatherings with Friends and Family: More than three times a week          Disposition Plan     Medically Ready for Discharge: Anticipated in 2-4 Days             GERTRUDE MCRAE MD  Hospitalist Service, [address]  Securely message with [employer] (more info)  Text page via [facility] Paging/Directory   See signed in provider for up to date coverage information  ______________________________________________________________________    Interval History   Patient was in the OR yesterday  Seen at bedside today, reporting some numbness in her right lower extremity which she attributes to the nerve block she received.  Over the course of the day, she reported increasing pain involving her right lower extremity.    Physical Exam   Vital Signs: Temp: 98.3  F (36.8  C) Temp src: Oral BP: (!) 168/64 Pulse: 81   Resp: 18 SpO2: (!) 91 % O2 Device: Nasal cannula Oxygen Delivery: 1 LPM  Weight: 0 lbs 0 oz    General Appearance: Sitting comfortably at the edge of the bed, in no acute distress or discomfort.  HEENT: PERRL: EOMI; moist mucous membrane w/o lesions  Neck: No JVD  Pulmonary: Clear to auscultation bilaterally, no wheezes or crackles  CVS: Regular rhythm, no murmurs, rubs or gallops  GI: BS (+), soft  nontender, no rebound or guarding   Extremities: Right lower extremity in Ace wrap.  Skin: No rashes or lesions  Neurologic: A&O x3      Medical Decision Making       45 MINUTES SPENT BY ME on the date of service doing chart review, history, exam, documentation & further activities per the note.      Data   ------------------------- PAST 24 HR DATA REVIEWED -----------------------------------------------    I have personally reviewed the following data over the past 24 hrs:    17.1 (H)  \   9.9 (L)   / N/A     N/A N/A N/A /  240 (H)   N/A N/A N/A \       Imaging results reviewed over the past 24 hrs:   Recent Results (from the past 24 hours)   XR Surgery JERROD L/T 5 Min Fluoro    Narrative    This exam was marked as non-reportable because it will not be read by a   radiologist or a [place] non-radiologist provider.

## 2025-07-14 NOTE — PLAN OF CARE
Goal Outcome Evaluation:      Plan of Care Reviewed With: patient    Overall Patient Progress: improving    Pt arrived from PACU at 2030, A&Ox4. Reported numbness to the RLE below the knee. Pt C/O pain to the knee and PRN Oxycodone was given.   VS: Temp: 98.3  F (36.8  C) Temp src: Oral BP: (!) 169/64 Pulse: 85   Resp: 17 SpO2: 92 % O2 Device: Nasal cannula Oxygen Delivery: 2 LPM    O2: SpO2 > 92% and stable on 2LPM of O2 via NC d/t de-sating when sleeping. LS clear and equal bilaterally. Denies chest pain and SOB.    Output: Voids spontaneously without difficulty to BSC.   Last BM: 7/12/2025 per pt, denies abdominal discomfort. BS active.    Activity: Up with Ax2 using walker and GB to bedside commode.  NWB to RLE.    Skin: WDL except, RLE surgical incisions.   Pain: Pain managed with ORN Oxycodone.    CMS: Intact, AOx4. Pt reported numbness to RLE.    Dressing: RLE surgical dressing and cast is CDI.    Diet: Regular diet. Denies nausea/vomiting.   BG checks, Pt has insulin pump.    LDA: R PIV SL.  Insulin pump.    Equipment: IV pole, personal belongings, call light, commode, walker.    Plan:  Continue with plan of care and pain management. Call light within reach, pt able to make needs known.    Additional Info: Pt BP has been elevated Provider was paged at [phone], and replied to continue monitor and notify if greater than 180/110.

## 2025-07-14 NOTE — CONSULTS
Inpatient Diabetes Management Service : New Consult Note     Patient: Divina Delgadillo   YOB: 1985    MRN: [id number]     Date of Consult : 07/14/2025   Date of Admission: 7/13/2025     Reason for Consult: Insulin pump   Consult Requestor: Jae Ortiz Md           History of Present Illness:   Divina Delgadillo is a 40 year old with Type 2 Diabetes Mellitus and complicated by CKD stage 3,  as well as a comorbid history of asthma, bipolar disorder, Chiari malformation type I, history of deep vein thrombosis in the left arm in 2017, depression, hypertension, obesity, nonalcoholic fatty liver disease, and schizoaffective disorder, presented with right leg pain following a syncopal episode at home. Pertinent past surgical history includes pancreatic cancer treated with distal pancreatectomy, splenectomy and left adrenalectomy 2015. Also has a history of left ankle avascular necrosis and left femur fracture treated with IMN. S/p Open reduction internal fixation of right tibia fracture with intramedullary ulysses fixation and  Open reduction internal fixation of right fibular fracture with distal fibula plate and screw construct on evening of 7/13.   Inpatient Diabetes Service consulted for Insulin pump management , post-op, after receiving dexamethasone 2 mg.     History obtained via the patient, chart review and discussion with primary team.       Additional Historian:  mother    Patient is NOT known to the Inpatient Diabetes Service from past admission(s).      Yesterday's plan was glargine 15 units at 1100 and pt to stop her insulin pump.  She did suspend it around 1744 last evening.  But then she restarted it at 2031.  This morning, she pointed out to her nurse the pump had been running but was out of insulin.  - today's basal delivery via pump :17 units  - Sunday basal via pump: 39 units  - Saturday and Sunday basal via pump: 25 units    Pt notes 25 units basal with a larger daily total of  "bolus insulin to be more common in her usual pattern of having 1-2 high carb meals.  She tolerated a full lunch without trouble.  Main complaint is pain at her surgical site.  Her insulin pump reservoir has been removed and pump is fully suspended since 1327 today.    Now, to have glargine increased to 30 units, aspart 1 unit per 10 grams, high correction.  Divina does not have additional infusion sets here, but family will bring tomorrow AM.  Creatinine improved 1.73.  Baseline creatinine 1.5-1.7     Inpatient Glucose Trends:      There was some misunderstanding of glucose orders, so no fingerstick performed before lunch.  Pt's CGM read 150 pre-lunch per nursing         Diabetes History:   Diabetes Type and Duration: 2, dx 2011    PTA Medication Regimen: Medtronic 780 G-- using Novolog  Runs in [employer] almost 100% of the time--> settings from review of device at bedside 7/14  ICR: 7.2  ISF: 25  BG target 100-140  Active insulin : 2 hour  \"Basal 1\": 1.5 units/h (36 units/24h)    Historical Diabetes Medications: metformin-- stopped d/t liver and kidney dsyfunction.  GLP-1-- stopped/excluded d/t pancreatitis    Glucose monitoring devices: Guardian 4  Hypoglycemia PTA:   - Frequency: rare  - Severity:  no history of severe unconscious lows   - Awareness:  intact  , starts with symptoms around 70 mg/dL  - Treatment: oral carbs     Outpatient Diabetes Provider: Shital Roberts,   Formal Diabetes Education/Educator: Rose Riley    ------------------------  Complications:  CKD3, neuropathy     Most recent A1c:   Lab Results   Component Value Date    A1C 8.3 06/18/2025    A1C 7.9 03/10/2025    A1C 7.7 02/28/2025    A1C 8.1 10/29/2024    A1C 7.8 07/30/2024    A1C 7.4 07/08/2021    A1C 8.0 03/26/2021    A1C 9.0 08/14/2020    A1C 7.8 05/18/2020    A1C 7.4 02/17/2020             History of DKA: no               Medications Impacting Glycemia:    Steroids: dexamethasone 2 mg evening 7/13  D5W containing " solutions/medications: no  Other medications impacting glucose: aripiprazole, quetiapine         Diet/Nutrition:    Orders Placed This Encounter      Regular Diet Adult      Diet              Review of Systems:    CC: pain in her leg  Diabetes pertinent positives and negatives in HPI         Past Medical History:       Past Medical History:   Diagnosis Date    Acquired asplenia     Acute pain of left knee 03/22/2019    Acute-on-chronic kidney injury 03/22/2019    ARF (acute renal failure) 03/23/2019    Asthma     Bipolar disorder (H)     Cardiac murmur     Cervical high risk HPV (human papillomavirus) test positive 06/20/2017    Chiari malformation type I (H)     Chronic bilateral low back pain without sciatica     Deep venous thrombosis of arm (H) 01/06/2017    ultrasound 1/6/2017-  venous thrombus in the antecubital fossa region and the left forearm as described    Depressive disorder     DVT (deep venous thrombosis) (H)     DVT of upper extremity (deep vein thrombosis) (H) 2021    Esophageal reflux     GERD    Hypertension     Insomnia     Leukocytosis     Mild intermittent asthma     Morbid obesity (H)     Mucinous neoplasm of pancreas     NAFL (nonalcoholic fatty liver)     Neck pain     Nicotine abuse     Other specified types of schizophrenia, unspecified condition     Schizoaffective disorder, depressive type (H)     Stage 3a chronic kidney disease (CKD) (H)     Type 2 diabetes mellitus (H)     Ulnar neuropathy of both upper extremities     Vitamin D insufficiency              Past Surgical History:      Past Surgical History:   Procedure Laterality Date    ADRENALECTOMY Left 2015    BIOPSY      BREAST SURGERY  2013    COLONOSCOPY      ENT SURGERY  10/01/2000    Tympanoplasty    IR LIVER BIOPSY PERCUTANEOUS  11/14/2019    OPEN REDUCTION INTERNAL FIXATION FIBULA Right 7/13/2025    Procedure: Open reduction internal fixation of right fibular fracture with distal fibula plate and screw construct;  Surgeon:  Veto Bain MD;  Location: [facility] OR    OPEN REDUCTION INTERNAL FIXATION TIBIA Right 2025    Procedure: Open reduction internal fixation of right tibia fracture with intramedullary ulysses fixation;  Surgeon: Veto Bain MD;  Location: [facility] OR    PANCREATECTOMY PARTIAL  2015    PERCUTANEOUS BIOPSY LIVER Right 2019    Procedure: Percutaneous Liver Biopsy;  Surgeon: Sean Guzman PA-C;  Location: [facility] OR    SPLENECTOMY  2015    TONSILLECTOMY  10/01/2000    Tonsillectomy             Social History:      Social History     Tobacco Use    Smoking status: Former     Current packs/day: 0.00     Average packs/day: 0.5 packs/day for 1.5 years (0.8 ttl pk-yrs)     Types: Cigarettes     Start date: 2023     Quit date: 2025     Years since quittin.1     Passive exposure: Yes    Smokeless tobacco: Never    Tobacco comments:     E- cig and cigarettes (2-3 cigarettes per day)   Substance Use Topics    Alcohol use: No     Comment: sober since         History   Sexual Activity    Sexual activity: Yes    Partners: Female, Male    Birth control/ protection: I.U.D.     Comment: Both male and female              Family History:    Family History of Diabetes: no diabetes    Family History   Problem Relation Age of Onset    Respiratory Mother         ASTHMA    Allergies Mother     Depression Mother     Chronic Obstructive Pulmonary Disease Mother     Anxiety Disorder Mother     Mental Illness Mother     Asthma Mother     Heart Disease Father         HEART VALVE REPLACEMENT    Hypertension Father     Diabetes Father     Depression Father     Anxiety Disorder Father     Mental Illness Father     Obesity Father     Respiratory Sister     Allergies Sister     Depression Sister     Respiratory Sister     Allergies Sister     Depression Sister     Respiratory Brother     Allergies Brother     Sleep Apnea Maternal Grandfather     Kidney Disease No family hx of     Liver Disease No family hx of      Liver Cancer No family hx of                Physical Exam:    BP (!) 168/64 (BP Location: Right arm)   Pulse 81   Temp 98.3  F (36.8  C) (Oral)   Resp 18   LMP  (LMP Unknown)   SpO2 (!) 91%    Resting in bed , NAD  Able to discuss her diabetes hx with ease  Oriented to self, place situation.  Had difficulty with timing details from last night         Laboratory Data:      Lab Results   Component Value Date    A1C 8.3 (H) 06/18/2025    A1C 7.9 (H) 03/10/2025    A1C 7.7 (H) 02/28/2025    A1C 8.1 (H) 10/29/2024    A1C 7.8 (H) 07/30/2024    A1C 7.4 (H) 07/08/2021    A1C 8.0 (H) 03/26/2021    A1C 9.0 (H) 08/14/2020    A1C 7.8 (H) 05/18/2020    A1C 7.4 (H) 02/17/2020     Recent Labs   Lab Test 07/14/25  0605 07/13/25  0506   WBC  --  14.8*   RBC  --  4.24   HGB 9.9* 11.2*   HCT  --  36.8   MCV 89 87   MCH  --  26.4*   MCHC  --  30.4*   RDW  --  17.1*   PLT  --  413     Recent Labs   Lab Test 07/14/25  0807 07/14/25  0141 07/13/25  1138 07/13/25  0506 07/12/25  1946 07/12/25  1938   NA  --   --   --  140  --  141   POTASSIUM  --   --   --  4.3  --  4.9   CHLORIDE  --   --   --  104  --  104   CO2  --   --   --  25 --  25   ANIONGAP  --   --   --  11  --  12   * 300*   < > 94   < > 83   BUN  --   --   --  28.9*  --  31.0*   CR  --   --   --  1.73*  --  1.86*   CLAY  --   --   --  9.0  --  9.3    < > = values in this interval not displayed.     Recent Labs   Lab Test 07/01/25  1553   PROTTOTAL 7.9   ALBUMIN 3.9   BILITOTAL 0.2   ALKPHOS 196*   AST 20   ALT 24            Assessment and Recommendations:       Assessment:   Type 2 Diabetes Mellitus, complicated by nephropathy, peripheral neuropathy. Not in target  (A1c 8.3 %, 6/18/2025)  Steroid hyperglycemia/stress hyperglycemia  Insulin pump use    Plan/Recommendations:    - Lantus 30 units q 24 hrs at NOW (ordered for 1100, eventually given near 1500), then planning return to pump therapy tomorrow  - Novolog vial will be needed for pump restart tomorrow   -  Novolog Meal Coverage: 1 units per 7 g CHO, TID AC and PRN with snacks/supplements   - Novolog Correction Scale: high resistance (ISF: 25)  TID AC / HS / 0200   - BG monitoring: TID AC, HS, 0200 / and PRN  - Hypoglycemia protocol    - Carb counting protocol   - diabetes educ consult for pump assessment-    Discussion:  Pt resumed her insulin pump last night independently.  In the setting of steroid and post-op stress, glargine 15 units plus pump were insufficient overnight. With additional bolus this morning, glucose improved.  Review of recent basal delivery via pump, and estimating steroid effect impact, it's reasonable to proceed with the 30 units glargine planned and adjust carb coverage to near the pt's baseline ratio.  She's eager for pump resumption tomorrow, after family brings supplies.  She understands timing may be in the afternoon versus setting temp basal, depending on prevailing glucose trends.    Discharge Planning: (tentative)    Medications: Novolog via hybrid closed loop insulin pump  Test Claims:  none needed.   Education:  Needs to be assessed closer to discharge.    Outpatient Follow-up:  recommend [facility] Shital Roberts 9/2/2025 and may benefit from interval visit with Dolly Riley-- would need to be scheduled       Thank you for this consult. IDS will continue to follow.      Please notify Inpatient Diabetes Service if changes are planned to steroids, nutrition, TPN/TF and anticipated procedures requiring prolonged NPO status.     Bianca Venegas, VERONIQUE CNS    To contact Inpatient Diabetes Service:     7 AM - 5 PM: Page the IDS DEEPTI following the patient that day (see filed or incomplete progress notes/consult notes under Endocrinology)    OR if uncertain of provider assignment: page job code [phone]    5 PM - 7 AM: First call after hours is to primary service.    For urgent after-hours questions, page job code for on call fellow: [phone]     I spent a total of 80 minutes on the date of the  encounter doing prep/post-work, chart review, history and exam, documentation and further activities per the note including lab review, multidisciplinary communication, counseling the patient and/or coordinating care regarding acute hyper/hypoglycemic management, as well as discharge management and planning/communication.  See note for details.

## 2025-07-14 NOTE — PLAN OF CARE
"Goal Outcome Evaluation:           Overall Patient Progress: no changeOverall Patient Progress: no change       VS: BP (!) 168/64 (BP Location: Right arm)   Pulse 81   Temp 98.3  F (36.8  C) (Oral)   Resp 18   LMP  (LMP Unknown)   SpO2 (!) 91%     O2: Was on 3 L O2. Weaned to 1L.    Output: Voids spontaneously without difficulty to BSC.   Last BM: 7/12/2025 per pt, denies abdominal discomfort. BS active.    Activity: Up with Ax1 using walker and GB to bedside commode.  NWB to RLE.    Skin: RLE surgical incisions; in hard cast   Pain: Pain managed with  PRN meds    CMS: Intact, AOx4. Pt reported numbness to RLE; Ortho NP aware; pt states numbness is dissipating.    Dressing: RLE surgical dressing and cast is CDI.    Diet: Regular diet. Denies nausea/vomiting.   BG checks, Pt has home insulin pump.    LDA: R PIV saline locked between IV antibiotics   Insulin pump.    Equipment: IV pole, personal belongings, call light, commode, walker.    Plan:  PT, OT, pain management   Additional Info: Pt states Oxycodone makes her whole body jerk. Ortho NP notified who switched pt to PO Dilaudid.    Spent much time with pt as she was anxious that her home insulin pump ran out of insulin and that her \"program would be erased.\"             "

## 2025-07-14 NOTE — PROGRESS NOTES
"Contacted by nursing staff for Divina Delgadillo who is 1 day s/p right tibia IMN, right ankle ORIF with Dr. Bain 7/13. She has had worsening pain of the operative knee, leg, and ankle today in addition to paresthesias of the right toes.    Patient was evaluated at bedside. She reported 10/10 pain at this time, which had worsened since this morning. States pain is globally from the right knee down the leg and into the ankle. Endorses \"tingling\" in the foot and toes that is worse since this morning, and new since postop. States she had been given Tylenol earlier in the day in addition to dilaudid but her pain has persisted. Denies any falls/slips/or concern for re-injury. She was OOB today to use the commode with assistance and has remained NWB RLE. She would like to discuss pain medication, she refrains from taking NSAIDs due to her kidneys, and avoids gabapentin.    On examination, the patient was in the postoperative splint which was loosened and windowed for further examination. Compartments were soft and compressible in the leg. No significant erythema or other skin changes noted. Sensation intact to light touch at the deep peroneal and tibial distributions, diminished at the superficial peroneal distribution, unable to assess sural or saphenous distributions. Toes are warm to touch with 2+ capillary refill. Diffusely tender to palpation about the right knee and leg. Able to move all digits of the right foot. Able to fire EHL and FHL with reasonable strength in the splint. No pain with passive motion of the toes.  At this time, the patient's examination was reassuring but we will continue to monitor her closely. Multimodal medication management was discussed. Recommended the patient continue with elevation of the RLE. Please contact orthopedics with any further concerns.       Yfn Moya MS4    NAKUL JUAREZ MD on 7/14/2025 at 6:04 PM  I was present with the medical student who participated in the service " and in the documentation of the note. I have verified the history and personally performed the physical exam and medical decision making. I agree with the assessment and plan of care as documented in the note.   care and reviewing laboratory values and radiologic reports for the patient.

## 2025-07-14 NOTE — OR NURSING
PACU to Inpatient Nursing Handoff    Patient Divina Delgadillo is a 40 year old female who speaks English.   Procedure Procedure(s):  Right Tibia Intramedullary nail tibia fracture  Open reduction internal fixation fibula   Surgeon(s) Primary: Veto Bain MD  Resident - Assisting: Maribeth Leyva MD  Resident - Observing: Wong Davis DO     Allergies   Allergen Reactions    Gabapentin Other (See Comments)     Interfered with psych meds causing angry episode    Oxycodone-Acetaminophen Other (See Comments)     Shaky, jerky       Isolation  No active isolations     Past Medical History   has a past medical history of Acquired asplenia, Acute pain of left knee (03/22/2019), Acute-on-chronic kidney injury (03/22/2019), ARF (acute renal failure) (03/23/2019), Asthma, Bipolar disorder (H), Cardiac murmur, Cervical high risk HPV (human papillomavirus) test positive (06/20/2017), Chiari malformation type I (H), Chronic bilateral low back pain without sciatica, Deep venous thrombosis of arm (H) (01/06/2017), Depressive disorder, DVT (deep venous thrombosis) (H), DVT of upper extremity (deep vein thrombosis) (H) (2021), Esophageal reflux, Hypertension, Insomnia, Leukocytosis, Mild intermittent asthma, Morbid obesity (H), Mucinous neoplasm of pancreas, NAFL (nonalcoholic fatty liver), Neck pain, Nicotine abuse, Other specified types of schizophrenia, unspecified condition, Schizoaffective disorder, depressive type (H), Stage 3a chronic kidney disease (CKD) (H), Type 2 diabetes mellitus (H), Ulnar neuropathy of both upper extremities, and Vitamin D insufficiency.    Anesthesia General with Block   Dermatome Level     Preop Meds See MAR    Nerve block Adductor canal.  Location:right. Med:bupivacaine, epinephrine, and Decadron. Time given: 1506   Intraop Meds ceFAZolin (ANCEF) 2 g in dextrose 50 mL intermittent infusion (g)   Total dose: 2 g Dosing weight: 104.3  Date/Time Rate/Dose/Volume Action Route Admin User  Audit    07/13/25 1535 2 g Given Intravenous Pohlkamp, Bhavna Laura, APRN CRNA     tranexamic acid 1 g in 100 mL NS IV bag (premix) (g)   Total dose: 1 g Dosing weight: 104.3  Date/Time Rate/Dose/Volume Action Route Admin User Audit    07/13/25 1536 1 g Given Intravenous Pohlkamp, Bhavna Laura, APRN CRNA edited    midazolam 1 mg/mL (mg)   Total dose: 2 mg  Date/Time Rate/Dose/Volume Action Route Admin User Audit    07/13/25 1519 2 mg Given Intravenous Pohlkamp, Bhavna Laura, APRN CRNA     lidocaine 2% (mg)   Total dose: 100 mg  Date/Time Rate/Dose/Volume Action Route Admin User Audit    07/13/25 1526 100 mg Given Intravenous Pohlkamp, Bhavna Laura, APRN CRNA     propofol 10 mg/mL (mg)   Total dose: 200 mg  Date/Time Rate/Dose/Volume Action Route Admin User Audit    07/13/25 1527 200 mg Given Intravenous Pohlkamp, Bhavna Laura, APRN CRNA     rocuronium 10 mg/mL (mg)   Total dose: 40 mg  Date/Time Rate/Dose/Volume Action Route Admin User Audit    07/13/25 1529 40 mg Given Intravenous Pohlkamp, Bhavna Laura, APRN CRNA     phenylephrine (JANNY-SYNEPHRINE) injection (mcg)   Total dose: 100 mcg  Date/Time Rate/Dose/Volume Action Route Admin User Audit    07/13/25 1542 100 mcg New Bag Intravenous Pohlkamp, Bhavna Laura, APRN CRNA     ondansetron 2 mg/mL (mg)   Total dose: 4 mg  Date/Time Rate/Dose/Volume Action Route Admin User Audit    07/13/25 1835 4 mg Given Intravenous Pohlkamp, Bhavna Laura, APRN CRNA     sugammadex (BRIDION) 200mg/2mL (mg)   Total dose: 100 mg  Date/Time Rate/Dose/Volume Action Route Admin User Audit    07/13/25 1906 100 mg Given Intravenous Pohlkamp, Bhavna Laura, APRN CRNA     phenylephrine 0.1 mg/mL infusion (mcg/kg/min) (mcg/kg/min)   Total dose: 2.44 mg Dosing weight: 104.3  Date/Time Rate/Dose/Volume Action Route Admin User Audit    07/13/25 1548 0.3 mcg/kg/min - 18.774 mL/hr New Bag Intravenous Bhavna Chun APRN CRNA     1706  Stopped Intravenous Bhavna Chun  VERONIQUE Sanchez CRNA     Bupivacaine 0.25% w/ 1:200K Epi (Injection) (mL)   Total volume: 20 mL  Date/Time Rate/Dose/Volume Action Route Admin User Audit    07/13/25 1506 20 mL Given Injection Wong Stephenson MD     Bupivacaine 0.25% w/ 1:200K Epi (Injection) (mL)   Total volume: 20 mL  Date/Time Rate/Dose/Volume Action Route Admin User Audit    07/13/25 1453 20 mL Given Injection Wong Stephenson MD     Dexamethasone 10 mg/mL PF (Perineural) (mg)   Total dose: 2 mg  Date/Time Rate/Dose/Volume Action Route Admin User Audit    07/13/25 1506 2 mg Given Perineural Wong Stephenson MD     LR (mL)   Total volume: 1,000 mL  Date/Time Rate/Dose/Volume Action Route Admin User Audit    07/13/25 1519  New Bag Intravenous Pohlkamp, VERONIQUE Begum CRNA     1844 1,000 mL New Bag Intravenous Pohlkamp, VERONIQUE Begum CRNA          Local Meds No   Antibiotics cefazolin (Ancef) - last given at 1535     Pain Patient Currently in Pain: no   PACU meds  Not applicable   PCA / epidural No   Capnography     Telemetry ECG Rhythm: Normal sinus rhythm   Inpatient Telemetry Monitor Ordered? No        Labs Glucose Lab Results   Component Value Date     07/13/2025     12/19/2022     07/08/2021       Hgb Lab Results   Component Value Date    HGB 11.2 07/13/2025    HGB 12.9 07/08/2021       INR Lab Results   Component Value Date    INR 1.02 07/13/2025    INR 0.91 07/08/2021      PACU Imaging Not applicable     Wound/Incision Incision/Surgical Site 07/13/25 Anterior;Right Knee (Active)   Incision Assessment UTV 07/13/25 1930   Dressing Per Plan of Care 07/13/25 1930   Closure GERRI 07/13/25 1930   Incision Drainage Amount UTV 07/13/25 1930   Dressing Intervention Clean, dry, intact 07/13/25 1930   Number of days: 0       Incision/Surgical Site 07/13/25 Right;Lateral;Lower Leg (Active)   Incision Assessment UTV 07/13/25 1930   Dressing Dry gauze;Xeroform 07/13/25 9044   Closure Approximated;Sutures  07/13/25 1843   Incision Drainage Amount None 07/13/25 1843   Dressing Intervention New dressing applied 07/13/25 1843   Number of days: 0       Incision/Surgical Site 07/13/25 Right;Medial;Lower Leg (Active)   Incision Assessment UTV 07/13/25 1930   Dressing Dry gauze;Xeroform 07/13/25 1843   Closure Sutures;Approximated 07/13/25 1843   Incision Drainage Amount None 07/13/25 1843   Dressing Intervention New dressing applied 07/13/25 1843   Number of days: 0       Incision/Surgical Site 07/13/25 Right;Medial Ankle (Active)   Incision Assessment UTV 07/13/25 1930   Dressing Dry gauze;Xeroform 07/13/25 1843   Closure Sutures;Approximated 07/13/25 1843   Incision Drainage Amount None 07/13/25 1843   Dressing Intervention New dressing applied 07/13/25 1843   Number of days: 0       Incision/Surgical Site 07/13/25 Right;Lateral Ankle (Active)   Incision Assessment UTV 07/13/25 1930   Dressing Dry gauze;Xeroform 07/13/25 1843   Closure Sutures;Approximated 07/13/25 1843   Incision Drainage Amount None 07/13/25 1843   Dressing Intervention New dressing applied 07/13/25 1843   Number of days: 0       Incision/Surgical Site 07/13/25 Right;Lateral Heel (Active)   Incision Assessment UTV 07/13/25 1930   Dressing Dry gauze;Xeroform 07/13/25 1843   Closure Sutures;Approximated 07/13/25 1843   Incision Drainage Amount None 07/13/25 1843   Dressing Intervention New dressing applied 07/13/25 1843   Number of days: 0      CMS        Equipment ice pack   Other LDA       IV Access Peripheral IV 07/12/25 Left Antecubital fossa (Active)   Site Assessment WDL 07/13/25 1930   Line Status Saline locked 07/13/25 1930   Dressing Transparent 07/13/25 1930   Dressing Status clean;dry;intact 07/13/25 1930   Line Intervention Flushed 07/13/25 1930   Line Necessity Yes, meets criteria 07/13/25 1930   Phlebitis Scale 0-->no symptoms 07/13/25 1930   Infiltration? no 07/13/25 1930   Number of days: 1       Peripheral IV 07/13/25 Right Hand (Active)    Site Assessment WDL 07/13/25 1930   Line Status Infusing 07/13/25 1930   Dressing Transparent 07/13/25 1930   Dressing Status clean;dry;intact 07/13/25 1930   Line Necessity Yes, meets criteria 07/13/25 1930   Phlebitis Scale 0-->no symptoms 07/13/25 1930   Infiltration? no 07/13/25 1930   Number of days: 0      Blood Products Not applicable  mL   Intake/Output Date 07/13/25 0700 - 07/14/25 0659   Shift 5944-8190 4037-7953 8296-6324 24 Hour Total   INTAKE   I.V.  1000  1000   Shift Total  1000  1000   OUTPUT   Urine 400   400   Shift Total 400   400   Weight (kg)          Drains / Vincent     Time of void PreOp Time of Void Prior to Procedure: 1415 (07/13/25 1515)    PostOp Urine Occurrence: 1 (07/13/25 1344)    Diapered? No   Bladder Scan Bladder Scan Volume (mL): 265 ml (07/13/25 1935)   PO    tolerating sips     Vitals    B/P: (!) 157/64  T: 98.8  F (37.1  C)    Temp src: Axillary  P:  Pulse: 74 (07/13/25 1930)          R: 15  O2:  SpO2: 95 %    O2 Device: Oxymask (07/13/25 1930)    Oxygen Delivery: 7 LPM (07/13/25 1930)         Family/support present None- boyfriend and boyfriend's mom called and patient was okay with us updating them    Patient belongings     Patient transported on bed   DC meds/scripts (obs/outpt) Not applicable   Inpatient Pain Meds Released? No       Special needs/considerations None   Tasks needing completion None       Ivelisse Schilling, RN  [place]

## 2025-07-14 NOTE — ANESTHESIA CARE TRANSFER NOTE
Patient: Divina Delgadillo    Procedure: Procedure(s):  Right Tibia Intramedullary nail tibia fracture  Open reduction internal fixation fibula       Diagnosis: Tibia/fibula fracture, right, closed, initial encounter [S82.201A, S82.401A]  Diagnosis Additional Information: No value filed.    Anesthesia Type:   No value filed.     Note:    Oropharynx: oropharynx clear of all foreign objects and spontaneously breathing  Level of Consciousness: drowsy  Oxygen Supplementation: face mask  Level of Supplemental Oxygen (L/min / FiO2): 6  Independent Airway: airway patency satisfactory and stable  Dentition: dentition unchanged  Vital Signs Stable: post-procedure vital signs reviewed and stable  Report to RN Given: handoff report given  Patient transferred to: PACU    Handoff Report: Identifed the Patient, Identified the Reponsible Provider, Reviewed the pertinent medical history, Discussed the surgical course, Reviewed Intra-OP anesthesia mangement and issues during anesthesia, Set expectations for post-procedure period and Allowed opportunity for questions and acknowledgement of understanding      Vitals:  Vitals Value Taken Time   /64    Temp 37.1    Pulse 74 07/13/25 19:30   Resp 15 07/13/25 19:30   SpO2 95 % 07/13/25 19:30   Vitals shown include unfiled device data.    Electronically Signed By: VERONIQUE Farley CRNA  July 13, 2025  7:30 PM

## 2025-07-14 NOTE — CONSULTS
Care Management Initial Consult    General Information  Assessment completed with: Patient,    Type of CM/SW Visit: Initial Assessment    Primary Care Provider verified and updated as needed: Yes   Readmission within the last 30 days: no previous admission in last 30 days      Reason for Consult: discharge planning  Advance Care Planning: Advance Care Planning Reviewed: present on chart          Communication Assessment  Patient's communication style: spoken language (English or Bilingual)             Cognitive  Cognitive/Neuro/Behavioral: WDL  Level of Consciousness: alert  Arousal Level: opens eyes spontaneously  Orientation: oriented x 4  Mood/Behavior: calm, cooperative  Best Language: 0 - No aphasia  Speech: clear, spontaneous, logical    Living Environment:   People in home: alone     Current living Arrangements: apartment      Able to return to prior arrangements: yes       Family/Social Support:  Care provided by: self, other (see comments) (home nurse weekly or bi-weeekly and in home services when the agency is able to provide  this staffing.)  Provides care for: no one  Marital Status: Single  Support system: Parent(s), Sibling(s), Guardian          Description of Support System: Supportive, Involved    Support Assessment: Adequate family and caregiver support    Current Resources:   Patient receiving home care services: Yes        Community Resources: [place] [facility]  Equipment currently used at home: none  Supplies currently used at home: Diabetic Supplies    Employment/Financial:  Employment Status: other (see comments) (Pt will work part time when her physical health allows and the job training site is able to meet her needs.)        Financial Concerns: none   Referral to Financial Worker: No       Does the patient's insurance plan have a 3 day qualifying hospital stay waiver?  No    Lifestyle & Psychosocial Needs:  Social Drivers of Health     Food Insecurity: Low Risk  (1/2/2025)    Food  Insecurity     Within the past 12 months, did you worry that your food would run out before you got money to buy more?: No     Within the past 12 months, did the food you bought just not last and you didn t have money to get more?: No   Depression: Not at risk (6/12/2025)    PHQ-2     PHQ-2 Score: 1   Housing Stability: Low Risk  (1/2/2025)    Housing Stability     Do you have housing? : Yes     Are you worried about losing your housing?: No   Tobacco Use: Medium Risk (7/1/2025)    Patient History     Smoking Tobacco Use: Former     Smokeless Tobacco Use: Never     Passive Exposure: Yes   Financial Resource Strain: Low Risk  (1/2/2025)    Financial Resource Strain     Within the past 12 months, have you or your family members you live with been unable to get utilities (heat, electricity) when it was really needed?: No   Alcohol Use: Not on file   Transportation Needs: Low Risk  (1/2/2025)    Transportation Needs     Within the past 12 months, has lack of transportation kept you from medical appointments, getting your medicines, non-medical meetings or appointments, work, or from getting things that you need?: No   Physical Activity: Inactive (1/2/2025)    Exercise Vital Sign     Days of Exercise per Week: 1 day     Minutes of Exercise per Session: 0 min   Interpersonal Safety: Low Risk  (7/1/2025)    Interpersonal Safety     Do you feel physically and emotionally safe where you currently live?: Yes     Within the past 12 months, have you been hit, slapped, kicked or otherwise physically hurt by someone?: No     Within the past 12 months, have you been humiliated or emotionally abused in other ways by your partner or ex-partner?: No   Stress: Stress Concern Present (1/2/2025)    [employer] - Occupational Stress Questionnaire     Feeling of Stress : To some extent   Social Connections: Unknown (1/2/2025)    Social Connection and Isolation Panel [NHANES]     Frequency of Communication with  Friends and Family: Not on file     Frequency of Social Gatherings with Friends and Family: More than three times a week     Attends Yazdanism Services: Not on file     Active Member of Clubs or Organizations: Not on file     Attends Club or Organization Meetings: Not on file     Marital Status: Not on file   Health Literacy: Not on file       Functional Status:  Prior to admission patient needed assistance:   Dependent ADLs:: Independent  Dependent IADLs:: Independent       Mental Health Status:  Mental Health Status: Current Concern  Mental Health Management: Psychiatrist    Chemical Dependency Status:  Chemical Dependency Status: Past Concern  Chemical Dependency Management: Other (see comment) (unclear-not  identififed)          Values/Beliefs:  Spiritual, Cultural Beliefs, Yazdanism Practices, Values that affect care: yes (Pt identifies as Zoroastrianism)               Discussed  Partnership in Safe Discharge Planning  document with patient/family: No    Additional Information:  Divina Delgadillo is a 40 year old female with a PMHx including T2DM, CKD3 on insulin, asthma, bipolar disorder, Chiari malformation type 1, DVT left arm in 2017, depression, HTN, obesity, NAFL, pancreatic cancer  s/p distal pancreatectomy, splenectomy, left adrenalectomy 02/26/2015, schizoaffective disorder, left ankle AVN, and left femur fracture s/p IMN who presents with right leg pain. She had a syncopal/vertigo episode at home with all of her weight on her right leg and had severe pain and collapsed to the ground.  She crawled to her phone and called for help.  She presented to an outside hospital where she was found to have right tibia and fibula fractures, a splint was placed, and she was [facility] intervention.  She currently reports pain is controlled in the right leg.  Intermittent mild tingling in the toes.  No other injuries.  She last ate yesterday.  She does not take blood thinners.  She denies prior  injury or surgery to the right leg.     Pt is a 40 year old single female. Pt resides independently in an apartment in [place].  Pt has a Guardian. Pt has support from nursing for weekly medication set up. Pt uses medical rides or receives rides  from in home support staff  when they are available. Per  Pt's guardian the  home health staffing agency has experienced problems maintaining staff so these services haven't been offered recently. Pt  uses diabetic supplies and has previously used a shower chair. Pt receives RSDI. Pt is independent with ADLs and IADLS. Pt has mental health concerns including Schizophrenia and takes medications for these. Pt is seen at the [facility] for psychiatry. Pt has pasts hx of substance  use concerns. Pt has a MH CM: Isabel Galan with [facility] Ph: [phone] and  a [facility] CM Shital Hayes Ph: [phone]. Per Guardian [facility] funds the home supports when they are available and the weekly medication fill.      Next Steps:   SW/RNCC to continue  following  for discharge planning.     Kavon Gonzales, LICSW  10 ICU & [place] ED   [facility] Acute Care Management  Ph: [phone]  Vocera: 10 ICU SW & Adult ED ETTA

## 2025-07-14 NOTE — PROGRESS NOTES
Med discrepancy resolved with Fabiola STERLING. Miscount on re-fill from Edmond donato, who we resolved over the phone with.

## 2025-07-14 NOTE — ANESTHESIA POSTPROCEDURE EVALUATION
Patient: Divina Delgadillo    Procedure: Procedure(s):  Right Tibia Intramedullary nail tibia fracture  Open reduction internal fixation fibula       Anesthesia Type:  No value filed.    Note:  Disposition: Admission   Postop Pain Control: Uneventful            Sign Out: Well controlled pain   PONV: No   Neuro/Psych: Uneventful            Sign Out: Acceptable/Baseline neuro status   Airway/Respiratory: Uneventful            Sign Out: Acceptable/Baseline resp. status   CV/Hemodynamics: Uneventful            Sign Out: Acceptable CV status; No obvious hypovolemia; No obvious fluid overload   Other NRE: NONE   DID A NON-ROUTINE EVENT OCCUR? No           Last vitals:  Vitals Value Taken Time   /59 07/13/25 20:00   Temp 36.3  C (97.3  F) 07/13/25 20:00   Pulse 74 07/13/25 20:08   Resp 17 07/13/25 20:08   SpO2 93 % 07/13/25 20:12   Vitals shown include unfiled device data.    Electronically Signed By: Diana Morales MD  July 14, 2025  12:11 AM

## 2025-07-14 NOTE — PROGRESS NOTES
Orthopaedic Surgery Daily Progress Note     Subjective: Pain is well controlled. Tolerating diet. Denies numbness/tingling of operative extremity. No chest pain or shortness of breath. Has not gotten out of bed yet.     Physical Exam   BP (!) 169/64 (BP Location: Left arm)   Pulse 85   Temp 98.3  F (36.8  C) (Oral)   Resp 17   LMP  (LMP Unknown)   SpO2 92%     Intake/Output Summary (Last 24 hours) at 7/14/2025 0722  Last data filed at 7/14/2025 0500  Gross per 24 hour   Intake 1170 ml   Output 1050 ml   Net 120 ml     General: Alert, well-appearing  in no acute distress.  Respiratory: Non-labored breathing.   Cardiovascular: Extremities warm and well perfused   Extremities: moving all four extremities.   Right LE:  -Sens: SILT dp/sp/s/s/t  -Motor: 5/5 EHL/FHL/TA/GSc  -Vasc: 2+ pulses, wwp, brisk cap refill  Splint CDI    Skin: As noted above. No rashes or lesions appreciated.    Labs    Complete Blood Count   Recent Labs   Lab 07/14/25  0605 07/13/25  0506 07/12/25 1938   WBC  --  14.8* 14.6*   HGB 9.9* 11.2* 10.8*   PLT  --  413 441     Basic Metabolic Panel  Recent Labs   Lab 07/14/25  0141 07/13/25  2208 07/13/25  1606 07/13/25  1138 07/13/25  0506 07/12/25  1946 07/12/25 1938   NA  --   --   --   --  140  --  141   POTASSIUM  --   --   --   --  4.3  --  4.9   CHLORIDE  --   --   --   --  104  --  104   CO2  --   --   --   --  25 -- 25   BUN  --   --   --   --  28.9*  --  31.0*   CR  --   --   --   --  1.73*  --  1.86*   * 239* 145* 93 94   < > 83    < > = values in this interval not displayed.     Coagulation Profile  Recent Labs   Lab 07/13/25  0506   INR 1.02       Assessment/Plan:  Assessment and Plan:  Divina Delgadillo is a 40 year old female with right distal third tibial shaft fx w/ proximal and distal fibula fracture s/p right tibia IMN, right ankle ORIF with Dr. Bain 7/13.      Post op plan:   Med Primary team  Activity: Up with assist until independent. Avoid repetitive  lifting, bending, twisting  Weight bearing status: No weight bearing on right lower extremity  Pain management: Oral pain medications with IV for breakthrough. Wean IV as able.    Antibiotics: Ancef x 24 hours  Diet: Begin with clear fluids and progress diet as tolerated. Fabricio nutritional supplement twice daily  DVT prophylaxis: Mechanical prophylaxis with SCD  Imaging: radiographs in pacu  Labs: Monitor Hgb   Bracing/Splinting: short leg splint  Dressings: Keep clean, dry and intact. reinforce as necessary for strikethrough or saturation.   Physical Therapy/Occupational Therapy: Eval and treat.  Consults: IM, ETTA.  Follow-up: Clinic 2-3 weeks for suture removal  Disposition: Admitted for physical therapy and pain control    Maribeth Leyva MD   Orthopaedic Surgery Resident  P: [phone]

## 2025-07-15 ENCOUNTER — APPOINTMENT (OUTPATIENT)
Dept: PHYSICAL THERAPY | Facility: CLINIC | Age: 40
DRG: 492 | End: 2025-07-15
Attending: ORTHOPAEDIC SURGERY
Payer: COMMERCIAL

## 2025-07-15 LAB
GLUCOSE BLDC GLUCOMTR-MCNC: 206 MG/DL (ref 70–99)
GLUCOSE BLDC GLUCOMTR-MCNC: 209 MG/DL (ref 70–99)
GLUCOSE BLDC GLUCOMTR-MCNC: 211 MG/DL (ref 70–99)
GLUCOSE BLDC GLUCOMTR-MCNC: 237 MG/DL (ref 70–99)
GLUCOSE BLDC GLUCOMTR-MCNC: 242 MG/DL (ref 70–99)
GLUCOSE BLDC GLUCOMTR-MCNC: 266 MG/DL (ref 70–99)
GLUCOSE BLDC GLUCOMTR-MCNC: 306 MG/DL (ref 70–99)
GLUCOSE BLDC GLUCOMTR-MCNC: 347 MG/DL (ref 70–99)
GLUCOSE BLDC GLUCOMTR-MCNC: 384 MG/DL (ref 70–99)

## 2025-07-15 PROCEDURE — 97530 THERAPEUTIC ACTIVITIES: CPT | Mod: GP

## 2025-07-15 PROCEDURE — 250N000011 HC RX IP 250 OP 636: Performed by: SURGERY

## 2025-07-15 PROCEDURE — 250N000013 HC RX MED GY IP 250 OP 250 PS 637: Performed by: INTERNAL MEDICINE

## 2025-07-15 PROCEDURE — 97161 PT EVAL LOW COMPLEX 20 MIN: CPT | Mod: GP

## 2025-07-15 PROCEDURE — 250N000013 HC RX MED GY IP 250 OP 250 PS 637

## 2025-07-15 PROCEDURE — 99232 SBSQ HOSP IP/OBS MODERATE 35: CPT | Performed by: INTERNAL MEDICINE

## 2025-07-15 PROCEDURE — 99233 SBSQ HOSP IP/OBS HIGH 50: CPT | Performed by: PHYSICIAN ASSISTANT

## 2025-07-15 PROCEDURE — 250N000013 HC RX MED GY IP 250 OP 250 PS 637: Performed by: SURGERY

## 2025-07-15 PROCEDURE — 120N000002 HC R&B MED SURG/OB UMMC

## 2025-07-15 PROCEDURE — 97110 THERAPEUTIC EXERCISES: CPT | Mod: GP

## 2025-07-15 RX ADMIN — HYDROMORPHONE HYDROCHLORIDE 2 MG: 2 TABLET ORAL at 09:20

## 2025-07-15 RX ADMIN — ATORVASTATIN CALCIUM 20 MG: 20 TABLET, FILM COATED ORAL at 09:20

## 2025-07-15 RX ADMIN — FLUOXETINE HYDROCHLORIDE 60 MG: 40 CAPSULE ORAL at 09:20

## 2025-07-15 RX ADMIN — PANTOPRAZOLE SODIUM 40 MG: 40 TABLET, DELAYED RELEASE ORAL at 19:55

## 2025-07-15 RX ADMIN — INSULIN ASPART 25 UNITS: 100 INJECTION, SOLUTION INTRAVENOUS; SUBCUTANEOUS at 10:45

## 2025-07-15 RX ADMIN — HYDROMORPHONE HYDROCHLORIDE 2 MG: 2 TABLET ORAL at 20:19

## 2025-07-15 RX ADMIN — ARIPIPRAZOLE 15 MG: 5 TABLET ORAL at 09:22

## 2025-07-15 RX ADMIN — CARVEDILOL 12.5 MG: 12.5 TABLET, FILM COATED ORAL at 09:22

## 2025-07-15 RX ADMIN — METHOCARBAMOL 750 MG: 750 TABLET ORAL at 19:54

## 2025-07-15 RX ADMIN — ACETAMINOPHEN 975 MG: 325 TABLET ORAL at 14:28

## 2025-07-15 RX ADMIN — LAMOTRIGINE 100 MG: 100 TABLET ORAL at 23:09

## 2025-07-15 RX ADMIN — METHOCARBAMOL 750 MG: 750 TABLET ORAL at 09:22

## 2025-07-15 RX ADMIN — TOPIRAMATE 25 MG: 25 TABLET, FILM COATED ORAL at 09:20

## 2025-07-15 RX ADMIN — TOPIRAMATE 25 MG: 25 TABLET, FILM COATED ORAL at 19:55

## 2025-07-15 RX ADMIN — CLOZAPINE 100 MG: 100 TABLET ORAL at 23:09

## 2025-07-15 RX ADMIN — SENNOSIDES AND DOCUSATE SODIUM 1 TABLET: 50; 8.6 TABLET ORAL at 09:20

## 2025-07-15 RX ADMIN — CARVEDILOL 12.5 MG: 12.5 TABLET, FILM COATED ORAL at 18:14

## 2025-07-15 RX ADMIN — AMITRIPTYLINE HYDROCHLORIDE 10 MG: 10 TABLET, COATED ORAL at 23:09

## 2025-07-15 RX ADMIN — PANTOPRAZOLE SODIUM 40 MG: 40 TABLET, DELAYED RELEASE ORAL at 09:22

## 2025-07-15 RX ADMIN — HYDROXYZINE HYDROCHLORIDE 50 MG: 50 TABLET ORAL at 18:16

## 2025-07-15 RX ADMIN — SENNOSIDES AND DOCUSATE SODIUM 1 TABLET: 50; 8.6 TABLET ORAL at 19:55

## 2025-07-15 RX ADMIN — HYDROMORPHONE HYDROCHLORIDE 0.2 MG: 0.2 INJECTION, SOLUTION INTRAMUSCULAR; INTRAVENOUS; SUBCUTANEOUS at 05:04

## 2025-07-15 RX ADMIN — LORATADINE 10 MG: 10 TABLET ORAL at 09:20

## 2025-07-15 RX ADMIN — ACETAMINOPHEN 975 MG: 325 TABLET ORAL at 09:22

## 2025-07-15 RX ADMIN — ACETAMINOPHEN 975 MG: 325 TABLET ORAL at 19:54

## 2025-07-15 RX ADMIN — METHOCARBAMOL 750 MG: 750 TABLET ORAL at 14:28

## 2025-07-15 RX ADMIN — HYDROMORPHONE HYDROCHLORIDE 2 MG: 2 TABLET ORAL at 01:15

## 2025-07-15 RX ADMIN — SUVOREXANT 10 MG: 10 TABLET, FILM COATED ORAL at 23:08

## 2025-07-15 ASSESSMENT — ACTIVITIES OF DAILY LIVING (ADL)
ADLS_ACUITY_SCORE: 58
ADLS_ACUITY_SCORE: 55
ADLS_ACUITY_SCORE: 55
ADLS_ACUITY_SCORE: 58
ADLS_ACUITY_SCORE: 58
ADLS_ACUITY_SCORE: 55
ADLS_ACUITY_SCORE: 58
ADLS_ACUITY_SCORE: 55
ADLS_ACUITY_SCORE: 58
ADLS_ACUITY_SCORE: 55
ADLS_ACUITY_SCORE: 58
ADLS_ACUITY_SCORE: 55
ADLS_ACUITY_SCORE: 55
ADLS_ACUITY_SCORE: 56
ADLS_ACUITY_SCORE: 58
ADLS_ACUITY_SCORE: 55
ADLS_ACUITY_SCORE: 55
ADLS_ACUITY_SCORE: 58
ADLS_ACUITY_SCORE: 55
ADLS_ACUITY_SCORE: 58

## 2025-07-15 NOTE — PROGRESS NOTES
07/15/25 0900   Appointment Info   Signing Clinician's Name / Credentials (PT) Olivia Duarte, PT, DPT   Rehab Comments (PT) NWB R LE   Living Environment   People in Home alone   Current Living Arrangements apartment   Home Accessibility no concerns   Living Environment Comments Pt lives in apartment alone, no stairs.   Self-Care   Usual Activity Tolerance good   Current Activity Tolerance poor   Equipment Currently Used at Home none   Activity/Exercise/Self-Care Comment Per pt she was IND with ADLs prior to admission.  Had been working but was recently laid off.   General Information   Onset of Illness/Injury or Date of Surgery 07/13/25   Patient/Family Therapy Goals Statement (PT) Pt wants to feel better.   Pertinent History of Current Problem (include personal factors and/or comorbidities that impact the POC) 40 year old female with right distal third tibial shaft fx w/ proximal and distal fibula fracture s/p right tibia IMN, right ankle ORIF with Dr. Bain 7/13   Existing Precautions/Restrictions fall   Weight-Bearing Status - LUE full weight-bearing   Weight-Bearing Status - RUE full weight-bearing   Weight-Bearing Status - LLE full weight-bearing   Weight-Bearing Status - RLE nonweight-bearing   General Observations Activity: up with assist   Cognition   Affect/Mental Status (Cognition) WFL   Orientation Status (Cognition) oriented x 3   Follows Commands (Cognition) follows one-step commands   Pain Assessment   Patient Currently in Pain Yes, see Vital Sign flowsheet  (At R ankle)   Integumentary/Edema   Integumentary/Edema Comments R LE soft cast from knee and distal   Posture    Posture Protracted shoulders;Forward head position   Range of Motion (ROM)   ROM Comment Unable to assess in R knee due to cast, limited in R knee due to pain   Strength (Manual Muscle Testing)   Strength Comments L LE 5/5, R hip/knee grossly 2/5 (hard time activating hip and knee muscles due to pain)   Bed Mobility    Comment, (Bed Mobility) MaxA at R LE   Transfers   Comment, (Transfers) CGA with walker   Gait/Stairs (Locomotion)   Comment, (Gait/Stairs) CGA with walker   Balance   Balance Comments Impaired standing balance due to NWB in R LE, need for use of AD, unable to stand without holding onto walker   Sensory Examination   Sensory Perception Comments Impaired light touch sensation in R lower leg compared to L side   Clinical Impression   Criteria for Skilled Therapeutic Intervention Yes, treatment indicated   PT Diagnosis (PT) Impaired functional mobility   Influenced by the following impairments Decreased strength, balance and activity tolerance, increased pain   Functional limitations due to impairments Inability to complete functional mobility at baseline level of functioning   Clinical Presentation (PT Evaluation Complexity) stable   Clinical Presentation Rationale Clinical judgement   Clinical Decision Making (Complexity) low complexity   Planned Therapy Interventions (PT) balance training;bed mobility training;gait training;home exercise program;ROM (range of motion);strengthening;stretching;wheelchair management/propulsion training;transfer training   Risk & Benefits of therapy have been explained evaluation/treatment results reviewed;care plan/treatment goals reviewed;risks/benefits reviewed;current/potential barriers reviewed;participants voiced agreement with care plan;participants included;patient   Clinical Impression Comments Pt would benefit from IP PT to progress strength and IND with functional mobility.   PT Total Evaluation Time   PT Alondraal, Low Complexity Minutes (03164) 10   Physical Therapy Goals   PT Frequency 6x/week   PT Predicted Duration/Target Date for Goal Attainment 07/29/25   PT Goals Bed Mobility;Transfers;Gait;Wheelchair Mobility   PT: Bed Mobility Independent   PT: Transfers Modified independent;Within precautions   PT: Gait Modified independent;50 feet   PT: Wheelchair Mobility 150  feet;manual wheelchair   Interventions   Interventions Quick Adds Therapeutic Activity;Therapeutic Procedure   Therapeutic Procedure/Exercise   Ther. Procedure: strength, endurance, ROM, flexibillity Minutes (54021) 10   Treatment Detail/Skilled Intervention PT: Focus on R upper leg strength and ROM.  Attempting to complete glute sets, quad sets, heel slides and SAQ.  Pt with minimal quad activation in quad set, only able to complete approx 20-30 degrees knee flexion but needing PT assist to complete, and unable to complete SAQ as pt declining due to pain.  Pt educated on trying to work on quad sets on R side through out the day.   Therapeutic Activity   Therapeutic Activities: dynamic activities to improve functional performance Minutes (30377) 15   Treatment Detail/Skilled Intervention PT: Focus on mobility post eval, pt needing to get to commode, educated on NWB R LE and importance of holding foot off ground.  Pt needing CGA for sit<>stand with walker and CGA to get to commode, cues for safety with hand placement.  Pt needing assist for donning/doffing pants and pericares.  Discussion about being up in recliner today with LE elevated, discusuion about rehab vs home (pt wanting to go to rehab due to needing to be IND).  Pt left supine in bed all needs met, ice on ankle for pain.   PT Discharge Planning   PT Plan PT: R upper leg strength and ROM, IND with bed mob, short distanec ambulation with walker (20'), w/c propulsion, up in recliner   PT Discharge Recommendation (DC Rec) Transitional Care Facility   PT Rationale for DC Rec Pt below baseline and lives alone, would benefit from rehab stay prior to d/c home for safety.   PT Brief overview of current status MaxA for R LE for bed mob, CGA with walker for transfers   PT Total Distance Amb During Session (feet) 4   Physical Therapy Time and Intention   Timed Code Treatment Minutes 25   Total Session Time (sum of timed and untimed services) 35

## 2025-07-15 NOTE — PROGRESS NOTES
Orthopaedic Surgery Daily Progress Note     Subjective: Pain is well controlled this morning. Tolerating diet. Denies numbness/tingling of operative extremity. No chest pain or shortness of breath. Got out of bed to go to commode yesterday.    Physical Exam   BP (!) 159/67 (BP Location: Left arm)   Pulse 81   Temp 98.4  F (36.9  C) (Oral)   Resp 18   LMP  (LMP Unknown)   SpO2 (!) 90%     Intake/Output Summary (Last 24 hours) at 7/14/2025 0722  Last data filed at 7/14/2025 0500  Gross per 24 hour   Intake 1170 ml   Output 1050 ml   Net 120 ml     General: Alert, well-appearing  in no acute distress.  Respiratory: Non-labored breathing.   Cardiovascular: Extremities warm and well perfused   Extremities: moving all four extremities.   Right LE:  -Sens: SILT dp/sp/s/s/t  -Motor: 5/5 EHL/FHL/TA/GSc  -Vasc: 2+ pulses, wwp, brisk cap refill  Splint CDI    Skin: As noted above. No rashes or lesions appreciated.    Labs    Complete Blood Count   Recent Labs   Lab 07/14/25  1526 07/14/25  0605 07/13/25  0506 07/12/25 1938   WBC 17.1* 16.0* 14.8* 14.6*   HGB  --  9.9* 11.2* 10.8*   PLT  --   --  413 441     Basic Metabolic Panel  Recent Labs   Lab 07/15/25  0203 07/14/25  2214 07/14/25  2136 07/14/25  1751 07/13/25  1138 07/13/25  0506 07/12/25  1946 07/12/25 1938   NA  --   --   --   --   --  140  --  141   POTASSIUM  --   --   --   --   --  4.3  --  4.9   CHLORIDE  --   --   --   --   --  104  --  104   CO2  --   --   --   --   --  25  --  25   BUN  --   --   --   --   --  28.9*  --  31.0*   CR  --   --   --   --   --  1.73*  --  1.86*   * 344* 350* 240*   < > 94   < > 83    < > = values in this interval not displayed.     Coagulation Profile  Recent Labs   Lab 07/13/25  0506   INR 1.02       Assessment/Plan:  Assessment and Plan:  Divina Delgadillo is a 40 year old female with right distal third tibial shaft fx w/ proximal and distal fibula fracture s/p right tibia IMN, right ankle ORIF with Dr. Bain  7/13.      Today  Mobilization, pain control  Appropriate for discharge from ortho perspective once appropriate placement found and IV pain medication able to be weaned    Post op plan:   Med Primary team  Activity: Up with assist until independent. Avoid repetitive lifting, bending, twisting  Weight bearing status: No weight bearing on right lower extremity  Pain management: Oral pain medications with IV for breakthrough. Wean IV as able.    Antibiotics: Ancef x 24 hours  Diet: Begin with clear fluids and progress diet as tolerated. Fabricio nutritional supplement twice daily  DVT prophylaxis: Mechanical prophylaxis with SCD  Imaging: radiographs in pacu  Labs: Monitor Hgb   Bracing/Splinting: short leg splint  Dressings: Keep clean, dry and intact. reinforce as necessary for strikethrough or saturation.   Physical Therapy/Occupational Therapy: Eval and treat.  Consults: IM, SW.  Follow-up: Clinic 2-3 weeks for suture removal  Disposition: Admitted for physical therapy and pain control    Maribeth Leyva MD   Orthopaedic Surgery Resident  P: [phone]

## 2025-07-15 NOTE — PLAN OF CARE
Goal Outcome Evaluation:      Plan of Care Reviewed With: patient          Outcome Evaluation: Pt is medically stable for discharge, TCU placement pending.

## 2025-07-15 NOTE — CONSULTS
"Diabetes Educator consult reason: Pt new to the diabetes service. May need troubleshooting assist with pump resumption planned for Tuesday afternoon.    Per IDS team yesterday, Patient having issues with [employer] 780 G insulin pump \"losing settings\" if it is out of insulin for \"too long\". Patient had reported that pump ran out of insulin at 1030 and settings were lost a few hours later. Notified IDS that this not typical. Settings shouldn't be fully cleared unless done manually or if the battery has been dead for multiple days. If truly happening, patient should contact [employer] product support.    Discussed Endocrinology this AM via [employer]. They have met with patient and talked with patient's guardian for insulin pump assessment. No concerns with patient ability to manage at this time. Will follow-up with patient in coming days if needed.    VERONIQUE Malik, BETINA, [facility]  Diabetes Educator  Pager: [phone]  Office: [phone]  Securely message with [employer]     "

## 2025-07-15 NOTE — PLAN OF CARE
"Goal Outcome Evaluation:    VS: Vital signs:  Temp: 98.4  F (36.9  C) Temp src: Oral BP: (!) 159/67 Pulse: 81   Resp: 18 SpO2: (!) 90 % O2 Device: Nasal cannula Oxygen Delivery: 1 LPM      Estimated body mass index is 38.27 kg/m  as calculated from the following:    Height as of 7/12/25: 1.651 m (5' 5\").    Weight as of 7/12/25: 104.3 kg (230 lb).     O2: On RA. 1L O2.  Denies SOB, CP   Output: Voiding without any problem   Last BM: 7/12/25   Activity: A1 w/ walker and GB to bedside commode. NWB to RLE   Skin: Exception but intact.   Pain: Oral and IV dilaudid for pain rated 10/10.   CMS: A/O x4. Able to make needs known. N/T in RLE, Ortho provider aware.   Dressing: RLE surgical incision and cast CDI   Diet: Regular/Thin/Whole   LDA: R piv SL.   Equipment: Personal belongings, IV pole, call light , walker.   Plan: Pain management. Continue with plan of care   Additional Info:  0200 , 5 units of insulin.         "

## 2025-07-15 NOTE — PROGRESS NOTES
[facility]    Medicine Progress Note - Hospitalist Service, GOLD TEAM 16    Date of Admission:  7/13/2025    Assessment & Plan   Divina Delgadillo is a 40 year old female admitted on 7/13/2025. She has a past medical history of type 2 diabetes mellitus, chronic kidney disease stage III, asthma, bipolar disorder, Chiari malformation type I, history of deep vein thrombosis in the left arm in 2017, depression, hypertension, obesity, nonalcoholic fatty liver disease, and schizoaffective disorder, presented with right leg pain following a syncopal episode at home.  Pertinent past surgical history includes pancreatic cancer treated with distal pancreatectomy, splenectomy and left adrenalectomy 2015.  Also has a history of left ankle avascular necrosis and left femur fracture treated with IMN.    The patient experienced a fall, placing all her weight on her right leg, resulting in severe pain and collapse.  The patient was initially evaluated at an outside hospital, where imaging revealed closed spiral fractures of the right distal third tibia shaft and proximal and distal right fibula.  A splint was applied and patient was transferred to [facility] for orthopedic surgery management.  Patient has since undergone ORIF right tibia fracture with IMN ulysses fixation and ORIF of right fibula fracture with distal fibula plate and screw construct with Dr. Bain on 7/13/2025.    # Right leg fracture  # Right distal third tibial shaft fracture with proximal and distal fibula fracture  Patient presented following a fall; underwent imaging with x-ray right ankle, x-ray tib-fib, x-ray knee with findings including: an oblique mildly displaced fracture of the proximal fibula metadiaphyseal junction, spiral type displaced fracture of the distal shaft of the right fibula, and an oblique mildly displaced fracture of the lateral malleolus extending below the talar dome, with associated  moderate soft tissue swelling in the mild to distal right leg and ankle, and a chronic fracture of the anterior process of the calcaneus.  Patient underwent ORIF right tibia fracture with IMN ulysses fixation and ORIF of right fibula fracture with distal fibula plate and screw construct with Dr. Bain on 7/13/2025.  Plan:  - Postop care per orthopedic surgery.  - PT, OT consultation  - Multimodal pain control    # Leukocytosis-noted postop, likely related to stress.  Plan to continue to monitor.    # Postop hypoxia  # Acute hypoxic respiratory failure-secondary to atelectasis  -Encourage use of incentive spirometer  -Encourage early ambulation.    Chronic medical issues:  # Type 2 diabetes mellitus -patient normally on insulin pump.  Had been receiving Lantus here in the hospital since admission.  With the assistance from endocrinology, patient's pump has been restarted.    # CKD stage III-baseline creatinine 1.5-1.7.  Currently closer to baseline creatinine at this time.    # Schizoaffective disorder, depression type; PTSD, insomnia, adjustment disorder - PTA medications include aripiprazole, clozapine 100 mg nightly, fluoxetine 60 mg daily, lamotrigine 100 mg nightly, ?  Quetiapine, suvorexant 10 mg nightly.  Plan to continue all PTA medications.    # GERD-continue PTA PPI  # Migraine disorder-PT medications include amitriptyline, topiramate  # Hyperlipidemia-PTA medication includes atorvastatin  # Hypertension-PTA medication includes Bumex, Coreg 25 mg twice daily.  Resume PTA Bumex and continue PTA Coreg.          Diet: Regular Diet Adult  Diet    DVT Prophylaxis: Defer to primary service  Vincent Catheter: Not present  Lines: None     Cardiac Monitoring: None  Code Status: Full Code      Clinically Significant Risk Factors                   # Hypertension: Noted on problem list           # DMII: A1C = 8.3 % (Ref range: 0.0 - 5.6 %) within past 6 months   # Obesity: Estimated body mass index is 38.27 kg/m  as  "calculated from the following:    Height as of 7/12/25: 1.651 m (5' 5\").    Weight as of 7/12/25: 104.3 kg (230 lb)., PRESENT ON ADMISSION     # Financial/Environmental Concerns: none  # Asthma: noted on problem list        Social Drivers of Health    Tobacco Use: Medium Risk (7/1/2025)    Patient History     Smoking Tobacco Use: Former     Smokeless Tobacco Use: Never     Passive Exposure: Yes   Physical Activity: Inactive (1/2/2025)    Exercise Vital Sign     Days of Exercise per Week: 1 day     Minutes of Exercise per Session: 0 min   Stress: Stress Concern Present (1/2/2025)    [employer] - Occupational Stress Questionnaire     Feeling of Stress : To some extent   Social Connections: Unknown (1/2/2025)    Social Connection and Isolation Panel [NHANES]     Frequency of Social Gatherings with Friends and Family: More than three times a week          Disposition Plan     Medically Ready for Discharge: Anticipated in 2-4 Days             GERTRUDE MCRAE MD  Hospitalist Service, GOLD TEAM 16  [facility]  Securely message with [employer] [facility] (more info)  Text page via [facility] Paging/Directory   See signed in provider for up to date coverage information  ______________________________________________________________________    Interval History   As of this morning, patient's pain is well-controlled.  She denies any numbness or tingling.  Was able to get out of bed to the commode yesterday.  Her hemodynamics has been fine, however, she has required supplemental oxygen.  Ultimately, she denies any acute complaints and reports adequate pain control.  Patient seen by therapy well currently recommending discharge to TCU.    Physical Exam   Vital Signs: Temp: 98.4  F (36.9  C) Temp src: Oral BP: (!) 159/67 Pulse: 81   Resp: 18 SpO2: (!) 90 % O2 Device: Nasal cannula Oxygen Delivery: 1 LPM  Weight: 0 lbs 0 oz    General Appearance: Sitting comfortably at the " edge of the bed, in no acute distress or discomfort.  HEENT: PERRL: EOMI; moist mucous membrane w/o lesions  Neck: No JVD  Pulmonary: Clear to auscultation bilaterally, no wheezes or crackles  CVS: Regular rhythm, no murmurs, rubs or gallops  GI: BS (+), soft nontender, no rebound or guarding   Extremities: Right lower extremity in Ace wrap.  Skin: No rashes or lesions  Neurologic: A&O x3      Medical Decision Making       45 MINUTES SPENT BY ME on the date of service doing chart review, history, exam, documentation & further activities per the note.      Data   ------------------------- PAST 24 HR DATA REVIEWED -----------------------------------------------    I have personally reviewed the following data over the past 24 hrs:    17.1 (H)  \   N/A   / N/A     N/A N/A N/A /  306 (H)   N/A N/A N/A \       Imaging results reviewed over the past 24 hrs:   No results found for this or any previous visit (from the past 24 hours).

## 2025-07-15 NOTE — PLAN OF CARE
Goal Outcome Evaluation:      Plan of Care Reviewed With: patient    Overall Patient Progress: no change    Outcome Evaluation: A&Ox4; numbness and tingling to RLE. Denies headache, chest pain, SOB. Ax1 with walker, gait belt; NWB RLE. Continent of both bowel and bladder; LBM 7/11; passing flatus and denies feeling constipated. RLE cast- CDI. Regular diet, thin liquids, takes pills whole; blood sugar checks. Insulin pump restarted at 1400; orders for no Lantus today. Uses call light appropriately and makes needs known. TCU referrals sent.             Magui

## 2025-07-15 NOTE — PLAN OF CARE
Goal Outcome Evaluation:      Plan of Care Reviewed With: patient    Overall Patient Progress: no changeOverall Patient Progress: no change       VS: BP (!) 159/67 (BP Location: Left arm)   Pulse 81   Temp 98.4  F (36.9  C) (Oral)   Resp 18   LMP  (LMP Unknown)   SpO2 (!) 90%     O2: 1L NC   Output: Voids spontaneously without difficulty to BSC.   Last BM: 7/12/2025 per pt, denies abdominal discomfort. BS active. Passing gas, refused senna   Activity: Up with Ax1 using walker and GB to bedside commode.  NWB to RLE.    Skin: RLE surgical incisions; in hard cast   Pain: Pain managed with PRN & scheduled meds    CMS: Intact, AOx4. Pt reported numbness to RLE; Ortho aware.    Dressing: RLE surgical dressing and cast is CDI.    Diet: Regular diet. Denies nausea/vomiting.   BG checks, Pt has home insulin pump, but currently empty, it will be re-filled on this admission   LDA: R PIV saline locked between IV antibiotics   Insulin pump.    Equipment: IV pole, personal belongings, call light, commode, walker.    Plan:  PT, OT, pain management   Additional Info: Pt had  at bedtime, 7 units insulin given, on re-check BG was 344, MD notified.

## 2025-07-15 NOTE — PROGRESS NOTES
Care Management Follow Up    Length of Stay (days): 2  Expected Discharge Date: 07/15/2025    Concerns to be Addressed: TCU/SNF Referral      Additional Information:  CHW assisted SW in gathering TCU preferences and sending out initial referrals.    [place]:  [facility]   [address] [facility] [address]  Ph:[phone]  Fax:[phone]  7/15: initial referral sent    DECLINED:  [facility]  [phone]  Reason: can not meet pt. needs    YANIRA Daniels  5 Ortho/8 Med Surg Community Health Worker   [facility] Acute Care Management  Phone: [phone]

## 2025-07-15 NOTE — PROGRESS NOTES
Care Management Follow Up    Length of Stay (days): 2    Expected Discharge Date: 07/16/2025     Concerns to be Addressed: discharge planning     Patient plan of care discussed at interdisciplinary rounds: Yes    Anticipated Discharge Disposition: Transitional Care              Anticipated Discharge Services:  (Post acute therapies; MH CM; continued CADI CM upon return to community)    MH TESFAYE  Suzannesa Galan  PH: [phone]    CADI CM  Shital Hayes  PH: [phone]    Anticipated Discharge DME:  (short leg splint; all other DME to be provided by SNF)    Patient/family educated on Medicare website which has current facility and service quality ratings: yes  Education Provided on the Discharge Plan: Yes  Patient/Family in Agreement with the Plan: yes    Referrals Placed by CM/SW: Post Acute Facilities (see CHW note for referral status)  Private pay costs discussed: Not applicable d/t pt having MA for LTC    Discussed  Partnership in Safe Discharge Planning  document with patient/family: No     Handoff Completed: No, handoff not indicated or clinically appropriate    Additional Information:  CM received update from PT that pt has a TCU rec. CHW met with pt for TCU preferences and sent referrals.        Next Steps: SW/CHW to follow-up on TCU referrals tomorrow.  NEEDS:  - IMM  - PAS  - [facility]  Transportation          RAZA Phipps, LSW  5 Ortho   [facility] Acute Care Management  PHONE: [phone]  Available on Vocera:  5 Ortho ETTA

## 2025-07-16 VITALS
TEMPERATURE: 98.7 F | DIASTOLIC BLOOD PRESSURE: 66 MMHG | RESPIRATION RATE: 16 BRPM | SYSTOLIC BLOOD PRESSURE: 163 MMHG | HEART RATE: 82 BPM | OXYGEN SATURATION: 94 %

## 2025-07-16 LAB
CV ZIO PRELIM RESULTS: NORMAL
GLUCOSE BLDC GLUCOMTR-MCNC: 100 MG/DL (ref 70–99)
GLUCOSE BLDC GLUCOMTR-MCNC: 110 MG/DL (ref 70–99)
GLUCOSE BLDC GLUCOMTR-MCNC: 146 MG/DL (ref 70–99)
GLUCOSE BLDC GLUCOMTR-MCNC: 265 MG/DL (ref 70–99)

## 2025-07-16 PROCEDURE — 250N000013 HC RX MED GY IP 250 OP 250 PS 637

## 2025-07-16 PROCEDURE — 250N000013 HC RX MED GY IP 250 OP 250 PS 637: Performed by: INTERNAL MEDICINE

## 2025-07-16 PROCEDURE — 99233 SBSQ HOSP IP/OBS HIGH 50: CPT | Performed by: PHYSICIAN ASSISTANT

## 2025-07-16 PROCEDURE — 99232 SBSQ HOSP IP/OBS MODERATE 35: CPT | Performed by: INTERNAL MEDICINE

## 2025-07-16 PROCEDURE — 250N000013 HC RX MED GY IP 250 OP 250 PS 637: Performed by: SURGERY

## 2025-07-16 RX ORDER — ASPIRIN 81 MG/1
162 TABLET, COATED ORAL DAILY
Qty: 84 TABLET | Refills: 0 | DISCHARGE
Start: 2025-07-17

## 2025-07-16 RX ORDER — POLYETHYLENE GLYCOL 3350 17 G/17G
17 POWDER, FOR SOLUTION ORAL DAILY
Qty: 510 G | Refills: 0 | Status: SHIPPED | OUTPATIENT
Start: 2025-07-16 | End: 2025-07-16

## 2025-07-16 RX ORDER — ASPIRIN 81 MG/1
162 TABLET ORAL DAILY
Status: DISCONTINUED | OUTPATIENT
Start: 2025-07-16 | End: 2025-07-16 | Stop reason: HOSPADM

## 2025-07-16 RX ORDER — HYDROMORPHONE HYDROCHLORIDE 2 MG/1
2 TABLET ORAL EVERY 4 HOURS PRN
Qty: 26 TABLET | Refills: 0 | Status: SHIPPED | OUTPATIENT
Start: 2025-07-16 | End: 2025-07-17

## 2025-07-16 RX ORDER — POLYETHYLENE GLYCOL 3350 17 G/17G
17 POWDER, FOR SOLUTION ORAL DAILY
Qty: 510 G | Refills: 0 | DISCHARGE
Start: 2025-07-16

## 2025-07-16 RX ORDER — AMOXICILLIN 250 MG
1-2 CAPSULE ORAL 2 TIMES DAILY
Qty: 60 TABLET | DISCHARGE
Start: 2025-07-16

## 2025-07-16 RX ADMIN — ACETAMINOPHEN 975 MG: 325 TABLET ORAL at 08:06

## 2025-07-16 RX ADMIN — ARIPIPRAZOLE 15 MG: 5 TABLET ORAL at 08:06

## 2025-07-16 RX ADMIN — METHOCARBAMOL 750 MG: 750 TABLET ORAL at 13:48

## 2025-07-16 RX ADMIN — ATORVASTATIN CALCIUM 20 MG: 20 TABLET, FILM COATED ORAL at 08:08

## 2025-07-16 RX ADMIN — FLUOXETINE HYDROCHLORIDE 60 MG: 40 CAPSULE ORAL at 08:07

## 2025-07-16 RX ADMIN — HYDROMORPHONE HYDROCHLORIDE 2 MG: 2 TABLET ORAL at 00:50

## 2025-07-16 RX ADMIN — HYDROMORPHONE HYDROCHLORIDE 2 MG: 2 TABLET ORAL at 08:09

## 2025-07-16 RX ADMIN — SENNOSIDES AND DOCUSATE SODIUM 1 TABLET: 50; 8.6 TABLET ORAL at 08:07

## 2025-07-16 RX ADMIN — LORATADINE 10 MG: 10 TABLET ORAL at 08:08

## 2025-07-16 RX ADMIN — BUMETANIDE 0.5 MG: 0.5 TABLET ORAL at 08:08

## 2025-07-16 RX ADMIN — PANTOPRAZOLE SODIUM 40 MG: 40 TABLET, DELAYED RELEASE ORAL at 08:07

## 2025-07-16 RX ADMIN — ACETAMINOPHEN 975 MG: 325 TABLET ORAL at 13:48

## 2025-07-16 RX ADMIN — HYDROMORPHONE HYDROCHLORIDE 2 MG: 2 TABLET ORAL at 13:48

## 2025-07-16 RX ADMIN — CARVEDILOL 12.5 MG: 12.5 TABLET, FILM COATED ORAL at 08:08

## 2025-07-16 RX ADMIN — METHOCARBAMOL 750 MG: 750 TABLET ORAL at 08:08

## 2025-07-16 RX ADMIN — TOPIRAMATE 25 MG: 25 TABLET, FILM COATED ORAL at 08:08

## 2025-07-16 RX ADMIN — ASPIRIN 162 MG: 81 TABLET ORAL at 10:19

## 2025-07-16 ASSESSMENT — ACTIVITIES OF DAILY LIVING (ADL)
ADLS_ACUITY_SCORE: 62
ADLS_ACUITY_SCORE: 61
ADLS_ACUITY_SCORE: 62
ADLS_ACUITY_SCORE: 61
ADLS_ACUITY_SCORE: 62
ADLS_ACUITY_SCORE: 61
ADLS_ACUITY_SCORE: 62
ADLS_ACUITY_SCORE: 62

## 2025-07-16 NOTE — PROGRESS NOTES
Orthopaedic Surgery Daily Progress Note     Subjective: Pain is well controlled this morning. Tolerating diet. Denies numbness/tingling of operative extremity. No chest pain or shortness of breath. Working with therapy who rec TCU.    Physical Exam   BP (!) 152/55 (BP Location: Left arm)   Pulse 75   Temp 98.4  F (36.9  C) (Oral)   Resp 16   LMP  (LMP Unknown)   SpO2 94%     Intake/Output Summary (Last 24 hours) at 7/14/2025 0722  Last data filed at 7/14/2025 0500  Gross per 24 hour   Intake 1170 ml   Output 1050 ml   Net 120 ml     General: Alert, well-appearing  in no acute distress.  Respiratory: Non-labored breathing.   Cardiovascular: Extremities warm and well perfused   Extremities: moving all four extremities.   Right LE:  -Sens: SILT dp/sp/s/s/t  -Motor: 5/5 EHL/FHL/TA/GSc  -Vasc: 2+ pulses, wwp, brisk cap refill  Splint CDI    Skin: As noted above. No rashes or lesions appreciated.    Labs    Complete Blood Count   Recent Labs   Lab 07/14/25  1526 07/14/25  0605 07/13/25  0506 07/12/25 1938   WBC 17.1* 16.0* 14.8* 14.6*   HGB  --  9.9* 11.2* 10.8*   PLT  --   --  413 441     Basic Metabolic Panel  Recent Labs   Lab 07/16/25  0515 07/16/25  0143 07/15/25  2308 07/15/25  2225 07/13/25  1138 07/13/25  0506 07/12/25  1946 07/12/25  1938   NA  --   --   --   --   --  140  --  141   POTASSIUM  --   --   --   --   --  4.3  --  4.9   CHLORIDE  --   --   --   --   --  104  --  104   CO2  --   --   --   --   --  25 -- 25   BUN  --   --   --   --   --  28.9*  --  31.0*   CR  --   --   --   --   --  1.73*  --  1.86*   * 146* 242* 211*   < > 94   < > 83    < > = values in this interval not displayed.     Coagulation Profile  Recent Labs   Lab 07/13/25  0506   INR 1.02       Assessment/Plan:  Assessment and Plan:  Divina Delgadillo is a 40 year old female with right distal third tibial shaft fx w/ proximal and distal fibula fracture s/p right tibia IMN, right ankle ORIF with Dr. Bain 7/13.       Today  Mobilization, pain control  Appropriate for discharge from ortho perspective once appropriate placement found   Starting  mg x 6 weeks for DVT pphx    Post op plan:   Med Primary team  Activity: Up with assist until independent. Avoid repetitive lifting, bending, twisting  Weight bearing status: No weight bearing on right lower extremity  Pain management: Oral pain medications with IV for breakthrough. Wean IV as able.    Antibiotics: Ancef x 24 hours  Diet: Begin with clear fluids and progress diet as tolerated. Fabricio nutritional supplement twice daily  DVT prophylaxis: Mechanical prophylaxis with SCD,  x 6 weeks  Imaging: radiographs in pacu  Labs: Monitor Hgb   Bracing/Splinting: short leg splint  Dressings: Keep clean, dry and intact. reinforce as necessary for strikethrough or saturation.   Physical Therapy/Occupational Therapy: Eval and treat.  Consults: IM, SW.  Follow-up: Clinic 2-3 weeks for suture removal  Disposition: Admitted for physical therapy and pain control    Maribeth Leyva MD   Orthopaedic Surgery Resident  P: [phone]    Orthopaedics will continue to follow patient peripherally while inpatient. Please reach out with any questions regarding orthopaedic plan.     Please contact orthopaedics if patient is slow to mobilize, becomes unable to weight bear with current restrictions, is unable to decrease pain medication requirement, or shows signs of wound infection (redness, drainage, pain at incision site).

## 2025-07-16 NOTE — PROGRESS NOTES
Care Management Follow Up    Length of Stay (days): 3  Expected Discharge Date: 07/16/2025    Concerns to be Addressed: TCU/SNF Referral    Additional Information:    ACCEPTED:  [facility] [place]   [address] [place] [address]  Ph:[phone]  Fax:[phone]    CHW called [facility] (P: [phone]) to schedule discharge ride via wheelchair for 07/16/25  with pick-up window of 1:37pm to 2:22pm. Pickup from [facility] WB Unit  5 Ortho and drop off at [facility] [place].     CHW completed Senior Linkage Line Preadmission Screening (PAS) for patient. CHW completed PAS, confirmation number: [id number].     CHW updated pt. on the discharge plan. CHW also called Guardian and left a voicemail.    Susan Anderson, BA  5 Ortho/8 Med Surg Community Health Worker   [facility] Acute Care Management  Phone: [phone]

## 2025-07-16 NOTE — PLAN OF CARE
Goal Outcome Evaluation:      Plan of Care Reviewed With: patient    Overall Patient Progress: improving    Outcome Evaluation: Pt is discharging to TCU this afternoon and is looking forward to next phase of care.          VS: BP (!) 163/66 (BP Location: Right arm)   Pulse 82   Temp 98.7  F (37.1  C) (Oral)   Resp 16   LMP  (LMP Unknown)   SpO2 94%     O2: RA   Output: Voiding adequate amounts in BSC   Last BM: 7/12; pt reports this is normal bowel regimen for her and declined PRN medications   Activity: NWB RLE; up with 1x assist with walker and GB   Up for meals? Yes   Skin: Surgical incision to RLE   Pain: Controlled by scheduled robaxin and tylenol and PRN PO dilaudid   Neuro/CMS: A&Ox4. Baseline neuropathy in BLE, edema in LLE   Dressing: Surgical dressing is CDI   Diet: Regular   LDA: No IV access   Equipment: IV pole, walker, GB, PCD's and personal ambulatory diabetes equipment   Plan: Discharging to [facility] this afternoon   Additional Info:         DISCHARGE SUMMARY    Pt discharging to: [facility] TCU  Transportation: [facility]  AVS given and discussed: N/A  Stoplight Tool given and discussed: N/A  Medications given: Prescriptions sent to TCU  Belongings returned: Yes, ensured all belongings packed and sent with pt. No items in security.   Comments: Escorted safely to elevators. Pt left at 1440. Writer attempted to call TCU twice and was forwarded to voicemail box that was full.

## 2025-07-16 NOTE — PLAN OF CARE
Goal Outcome Evaluation:           Overall Patient Progress: no changeOverall Patient Progress: no change               VS: BP (!) 152/55 (BP Location: Left arm)   Pulse 75   Temp 98.4  F (36.9  C) (Oral)   Resp 16   LMP  (LMP Unknown)   SpO2 94%      O2: Stable on room air   Output: Continent of bowel and bladder   Last BM: 7/11/25   Activity: A1 with walker and gait belt   Skin: Visible skin in tact   Pain: Managed with scheduled and PRN medication   CMS: A&Ox4. Numbness and tingling in RLE, ortho aware.    Dressing: CDI   Diet: Regular diet   LDA: R PIV SL   Equipment: Personal belongings, IV pole, call light, walker   Plan:    Additional Info: BG checks, ambulatory pump in place.

## 2025-07-16 NOTE — PLAN OF CARE
Physical Therapy Discharge Summary    Reason for therapy discharge:    Discharged to long term care facility.    Progress towards therapy goal(s). See goals on Care Plan in [facility] electronic health record for goal details.  Goals partially met.  Barriers to achieving goals:   discharge from facility.    Therapy recommendation(s):    Continue therapies as recommended by accepting facility

## 2025-07-16 NOTE — DISCHARGE INSTRUCTIONS
"Diabetes Management Discharge Plan  \    Patient will need the following supplies prescribed:     None per patient      Blood glucose monitoring: Three times daily before meals, and at bedtime.  Blood Glucose (BG) goals:  mg/dL before meals, less than 180 mg/dL 2 hrs after meals.     Glucose Control Regimen:  Medtronic 780 G-- using Novolog  Run in PV Nano Cell   ICR: 7.2  from 00:00 to 23:59  ISF: 25  from 00:00 to 23:59  BG target 100-140  Active insulin : 2 hour  \"Basal 1\": 1.5 units/h (36 units/24h) from 00:00 to 23:59    Outpatient Diabetes Follow-Up:   Follow up with your Primary Care Provider (PCP) in 1-2 weeks, bring glucose data to your appointment.      Follow up sooner if blood glucose runs consistently greater than 200 mg/dL or if having more than two episodes less than 70 mg/dL.     If you have urgent questions or concerns regarding your blood sugars or insulin, you may contact [phone] (the main hospital ). Ask to speak with the Endocrinologist on call.             Thank you for letting the Diabetes Management Team be involved in your care!      Hypoglycemia (Low Blood Glucose) Management:  Signs/symptoms:  Shaking, sweating, fast heartbeat  Feeling dizzy, tired, or weak   Feeling anxious and easy to irritate  Feeling nervous, crabby, or confused  Hunger  Vision problems, headache  Numb or tingling mouth    If blood glucose is 51 to 70:   Eat or drink 15 grams of carbohydrate. Examples:   1/2 cup (4 ounces) apple juice or regular soda pop, or   1 cup (8 ounces) milk, or   15 skittles, or   3 to 4 glucose tablets.   Re-check your blood glucose in 15 minutes.   Repeat these steps every 15 minutes until your blood glucose is above 80.       If blood glucose is 50 or less:   Eat or drink 30 grams of carbohydrate. Examples:   1 cup (8 ounces) apple juice or regular soda pop, or   2 cups (16 ounces) milk, or   1 banana, or   6 to 8 glucose tablets.   Re-check your blood glucose in 15 minutes. "   Repeat these steps every 15 minutes until your blood glucose is above 80.

## 2025-07-16 NOTE — PROGRESS NOTES
Care Management Discharge Note    Discharge Date: 07/16/2025       Discharge Disposition: Transitional Care    [facility]   [address] [facility] [address]  Ph: [phone]  Pamela Rush, Liaison at [facility]  PH: [phone]  Fax: [phone]  [email or web address]    Discharge Services:  (Post acute therapies; MH CM; continued CADI CM upon return to community)    MH CM  Suzanne Galan  PH: [phone]     CADI CM  Shital Hayes  PH: [phone]    Discharge DME:  (short leg splint; all other DME to be provided by SNF)    Discharge Transportation:  ([facility] pick-up between 1:37 p.m. to 2:22 p.m.)    Private pay costs discussed: Not applicable d/t pt having MA for LTC    Does the patient's insurance plan have a 3 day qualifying hospital stay waiver?  Yes     Which insurance plan 3 day waiver is available? Alternative insurance waiver    Will the waiver be used for post-acute placement? Yes    PAS Confirmation Code:  [id number]   Patient/family educated on Medicare website which has current facility and service quality ratings: yes    Education Provided on the Discharge Plan: Yes  Persons Notified of Discharge Plans: Patient, pt's Guardian, bedside nurse, charge nurse, Dr. Ortiz (Hospitalist) and VERONIQUE Lamas (Ortho Liaison), and [place] Liaison  Patient/Family in Agreement with the Plan: yes    Handoff Referral Completed: Yes, [facility] PCP: Internal handoff referral completed    Additional Information:  Per IDT rounds, pt is med stable to discharge today.    CHW assisted with securing placement, coordinating TCU admission, setting up discharge transportation, updating pt and guardian of discharge plan, IMM, and PAS.     Pt will d/c to [facility] today (07/17/25); [facility] pick-up between 1:37 p.m. to 2:22 p.m.    ETTA messaged bedside nurse and charge nurse via [employer], provided update of discharge plan.    ETTA messaged Dr. Ortiz  (Hospitalist) and VREONIQUE Lamas (Ortho Liaison), via [employer], provided update of discharge plan and requested that orders be completed by 12:00 p.m. today.    1133: Hard script faxed to [place] Liaison.    1205: Discharge orders faxed to [place] Liaison.    1207: SW messaged Pamela Rush via Teams, stated timing of hard scripts and discharge orders being faxed, requested that she notify  if faxes are not received.          GABI PhippsW, LSW  5 Ortho   [facility] Acute Care Management  PHONE: [phone]  Available on Vocera:  5 Ortho ETTA

## 2025-07-16 NOTE — PROGRESS NOTES
"                       Inpatient Diabetes Management Service: Daily Progress Note     HPI: Divina Delgadillo is a 40 year old with Type 2 Diabetes Mellitus and complicated by CKD stage 3,  as well as a comorbid history of asthma, bipolar disorder, Chiari malformation type I, history of deep vein thrombosis in the left arm in 2017, depression, hypertension, obesity, nonalcoholic fatty liver disease, and schizoaffective disorder, presented with right leg pain following a syncopal episode at home. Pertinent past surgical history includes pancreatic cancer treated with distal pancreatectomy, splenectomy and left adrenalectomy 2015. Also has a history of left ankle avascular necrosis and left femur fracture treated with IMN. S/p Open reduction internal fixation of right tibia fracture with intramedullary ulysses fixation and  Open reduction internal fixation of right fibular fracture with distal fibula plate and screw construct on evening of 7/13/25.   Inpatient Diabetes Service consulted for Insulin pump management , post-op, after receiving dexamethasone 2 mg.         Assessment/Plan:     Assessment:   Type 2 Diabetes Mellitus, complicated by nephropathy, peripheral neuropathy. Not in target  (A1c 8.3 %, 6/18/2025)  Steroid hyperglycemia/stress hyperglycemia  Insulin pump use    Plan/Recommendations:     Medtronic 780 G-- using Novolog  Runs in VIRIDAXIS   Continue the below settings:  \"Basal 1\": 1.5 units/h (36 units/24h) from 00:00 to 23:59  ICR: 7.2  from 00:00 to 23:59  ISF: 25  from 00:00 to 23:59  BG target 100-140  Active insulin : 2 hour  - BG monitoring: TID AC, HS, 0200   - Hypoglycemia protocol    - Carb counting protocol     Discussion:    Called guardian on 7/15/2025 and guardian says she can management pump.  She can fill it and put it back on.Steroid  should be gone despite hanging on a little bit longer due to elevated creat.Received dexamethasone on 7/13/2025 at 15:06 so was 72 hours today at 3 pm.  " "XLS=911 this morning. No symptoms. No pump changes today.  Patient is discharging to TCU.  She has supplies for her pump including sensors and infusion sets. Knows when to change.  Patient bolused for KI=545 last night and . She was out with boyfriend taking a walk. She says she feels very comfortable running her pump and knows how to bolus.  Recommended that she try and eat lower carb meals.  Was drinking a sugar free pop.    Discharge Planning:   Medications: Novolog via hybrid closed loop insulin pump. With settings above.  Test Claims:  none needed.   Education:  Needs to be assessed closer to discharge.    Outpatient Follow-up:  recommend [facility] Shital Roberts 9/2/2025 and may benefit from interval visit with Dolly Riley-- would need to be scheduled        Diabetes Management Discharge Plan  Instructions to patient were posted in AVS and discussed on day of discharge.   Medications and supplies are to be ordered by primary service on discharge.   *please use the [employer] non-branded discharge supply order set ([id number])*    Patient will need the following supplies prescribed:     None per patient      Blood glucose monitoring: Three times daily before meals, and at bedtime.  Blood Glucose (BG) goals:  mg/dL before meals, less than 180 mg/dL 2 hrs after meals.     Glucose Control Regimen:  Medtronic 780 G-- using Novolog  Run in [employer]   ICR: 7.2  from 00:00 to 23:59  ISF: 25  from 00:00 to 23:59  BG target 100-140  Active insulin : 2 hour  \"Basal 1\": 1.5 units/h (36 units/24h) from 00:00 to 23:59    Outpatient Diabetes Follow-Up:   Follow up with your Primary Care Provider (PCP) in 1-2 weeks, bring glucose data to your appointment.      Follow up sooner if blood glucose runs consistently greater than 200 mg/dL or if having more than two episodes less than 70 mg/dL.     If you have urgent questions or concerns regarding your blood sugars or insulin, you may contact [phone] (the main " hospital ). Ask to speak with the Endocrinologist on call.             Thank you for letting the Diabetes Management Team be involved in your care!      Hypoglycemia (Low Blood Glucose) Management:  Signs/symptoms:  Shaking, sweating, fast heartbeat  Feeling dizzy, tired, or weak   Feeling anxious and easy to irritate  Feeling nervous, crabby, or confused  Hunger  Vision problems, headache  Numb or tingling mouth    If blood glucose is 51 to 70:   Eat or drink 15 grams of carbohydrate. Examples:   1/2 cup (4 ounces) apple juice or regular soda pop, or   1 cup (8 ounces) milk, or   15 skittles, or   3 to 4 glucose tablets.   Re-check your blood glucose in 15 minutes.   Repeat these steps every 15 minutes until your blood glucose is above 80.       If blood glucose is 50 or less:   Eat or drink 30 grams of carbohydrate. Examples:   1 cup (8 ounces) apple juice or regular soda pop, or   2 cups (16 ounces) milk, or   1 banana, or   6 to 8 glucose tablets.   Re-check your blood glucose in 15 minutes.   Repeat these steps every 15 minutes until your blood glucose is above 80.            Please notify Inpatient Diabetes Service if changes are planned to steroids, nutrition, TPN/TF and anticipated procedures requiring prolonged NPO status.         Interval History/Review of Systems :   The last 24 hours progress and nursing notes reviewed.  Having pain in leg from surgery. No n,v,d.  No labs today, creat=1.73 last on 7/13/2025  P  lanned Procedures/Surgeries: none    Inpatient Glucose Control:       Recent Labs   Lab 07/16/25  1151 07/16/25  0842 07/16/25  0515 07/16/25  0143 07/15/25  2308 07/15/25  2225   * 100* 110* 146* 242* 211*             Medications Impacting Glycemia:   Steroids: dexamethasone 2 mg 15:06 on 7/13/25  D5W containing solutions/medications: no  Other medications impacting glucose: aripiprazole, quetiapine         Nutrition:   Orders Placed This Encounter      Regular Diet Adult       "Diet      Diet    Supplements: none   TF: none   TPN: none         Diabetes History: see full consult note for complete diabetes history   Diabetes Type and Duration: 2, dx 2011     PTA Medication Regimen: Medtronic 780 G-- using Novolog  Runs in [employer] almost 100% of the time--> settings from review of device at bedside 7/14  ICR: 7.2  ISF: 25  BG target 100-140  Active insulin : 2 hour  \"Basal 1\": 1.5 units/h (36 units/24h)     Historical Diabetes Medications: metformin-- stopped d/t liver and kidney dsyfunction.  GLP-1-- stopped/excluded d/t pancreatitis     Glucose monitoring devices: Guardian 4  Hypoglycemia PTA:   - Frequency: rare  - Severity:  no history of severe unconscious lows   - Awareness:  intact  , starts with symptoms around 70 mg/dL  - Treatment: oral carbs      Outpatient Diabetes Provider: Shital Roberts,   Formal Diabetes Education/Educator: Rose Riley         Physical Exam:   BP (!) 163/66 (BP Location: Right arm)   Pulse 82   Temp 98.7  F (37.1  C) (Oral)   Resp 16   LMP  (LMP Unknown)   SpO2 94%   General:  well appearing, in no acute distress.  HEENT:  NC/AT. MMM, sclera not injected  Lungs:  unremarkable, no work of breathing on room air  ABD:  rounded  Skin:  warm and dry, no obvious lesions  MSK:   moving all extremities    Mental Status:  Alert and oriented  Psych:   Cooperative, good eye contact, full/appropriate affect  Sensor is on abdomen and infusion set is on and looks good         Data:     Lab Results   Component Value Date    A1C 8.3 (H) 06/18/2025    A1C 7.9 (H) 03/10/2025    A1C 7.7 (H) 02/28/2025    A1C 8.1 (H) 10/29/2024    A1C 7.8 (H) 07/30/2024    A1C 7.4 (H) 07/08/2021    A1C 8.0 (H) 03/26/2021    A1C 9.0 (H) 08/14/2020    A1C 7.8 (H) 05/18/2020    A1C 7.4 (H) 02/17/2020       ROUTINE IP LABS (Last four results)  BMP  Recent Labs   Lab 07/16/25  1151 07/16/25  0842 07/16/25  0515 07/16/25  0143 07/13/25  1138 07/13/25  0506 07/12/25  1946 " 07/12/25 1938   NA  --   --   --   --   --  140  --  141   POTASSIUM  --   --   --   --   --  4.3  --  4.9   CHLORIDE  --   --   --   --   --  104  --  104   CLAY  --   --   --   --   --  9.0  --  9.3   CO2  --   --   --   --   --  25  --  25   BUN  --   --   --   --   --  28.9*  --  31.0*   CR  --   --   --   --   --  1.73*  --  1.86*   * 100* 110* 146*   < > 94   < > 83    < > = values in this interval not displayed.     CBC  Recent Labs   Lab 07/14/25  1526 07/14/25  0605 07/13/25  0506 07/12/25 1938   WBC 17.1* 16.0* 14.8* 14.6*   RBC  --   --  4.24 4.09   HGB  --  9.9* 11.2* 10.8*   HCT  --   --  36.8 36.1   MCV 90 90  89 87 88   MCH  --   --  26.4* 26.4*   MCHC  --   --  30.4* 29.9*   RDW  --   --  17.1* 16.8*   PLT  --   --  413 441     INR  Recent Labs   Lab 07/13/25  0506   INR 1.02       Inpatient Diabetes Service will continue to follow, please don't hesitate to contact the team with any questions or concerns.     Vandana Serna PA-C  Inpatient Diabetes Service  [phone]  Available by [employer]  Date of Service: 7/16/2025      Plan discussed with patient, bedside RN in person, and primary team via this note.    To contact Inpatient Diabetes Service:     7 AM - 5 PM: Page the [employer] DEEPTI following the patient that day (see filed or incomplete progress notes/consult notes under Endocrinology)    OR if uncertain of provider assignment: page job code [phone]    5 PM - 7 AM: First call after hours is to primary service.    For urgent after-hours questions, page job code for on call fellow: [phone]     I spent a total of 50 minutes on the date of the encounter doing prep/post-work, chart review, history and exam, documentation and further activities per the note including lab review, multidisciplinary communication, counseling the patient and/or coordinating care regarding acute hyper/hypoglycemic management, as well as discharge management and planning/communication.

## 2025-07-16 NOTE — PROGRESS NOTES
[facility]    Medicine Progress Note - Hospitalist Service, GOLD TEAM 16    Date of Admission:  7/13/2025    Assessment & Plan   Divina Delgadillo is a 40 year old female admitted on 7/13/2025. She has a past medical history of type 2 diabetes mellitus, chronic kidney disease stage III, asthma, bipolar disorder, Chiari malformation type I, history of deep vein thrombosis in the left arm in 2017, depression, hypertension, obesity, nonalcoholic fatty liver disease, and schizoaffective disorder, presented with right leg pain following a syncopal episode at home.  Pertinent past surgical history includes pancreatic cancer treated with distal pancreatectomy, splenectomy and left adrenalectomy 2015.  Also has a history of left ankle avascular necrosis and left femur fracture treated with IMN.    The patient experienced a fall, placing all her weight on her right leg, resulting in severe pain and collapse.  The patient was initially evaluated at an outside hospital, where imaging revealed closed spiral fractures of the right distal third tibia shaft and proximal and distal right fibula.  A splint was applied and patient was transferred to [facility] for orthopedic surgery management.  Patient has since undergone ORIF right tibia fracture with IMN ulysses fixation and ORIF of right fibula fracture with distal fibula plate and screw construct with Dr. Bain on 7/13/2025.    # Right leg fracture  # Right distal third tibial shaft fracture with proximal and distal fibula fracture  Patient presented following a fall; underwent imaging with x-ray right ankle, x-ray tib-fib, x-ray knee with findings including: an oblique mildly displaced fracture of the proximal fibula metadiaphyseal junction, spiral type displaced fracture of the distal shaft of the right fibula, and an oblique mildly displaced fracture of the lateral malleolus extending below the talar dome, with associated  moderate soft tissue swelling in the mild to distal right leg and ankle, and a chronic fracture of the anterior process of the calcaneus.  Patient underwent ORIF right tibia fracture with IMN ulysses fixation and ORIF of right fibula fracture with distal fibula plate and screw construct with Dr. Bain on 7/13/2025.  Plan:  - Postop care per orthopedic surgery.  - PT, OT consultation, recommending discharge to TCU.  Patient is currently slated for discharge on 7/16/2025.  - Multimodal pain control    # Leukocytosis-noted postop, likely related to stress.  Plan to continue to monitor.    # Postop hypoxia  # Acute hypoxic respiratory failure-secondary to atelectasis  -Encourage use of incentive spirometer  -Encourage early ambulation.    Chronic medical issues:  # Type 2 diabetes mellitus -patient normally on insulin pump.  Had been receiving Lantus here in the hospital since admission.  With the assistance from endocrinology, patient's pump has been restarted.    # CKD stage III-baseline creatinine 1.5-1.7.  Currently closer to baseline creatinine at this time.    # Schizoaffective disorder, depression type; PTSD, insomnia, adjustment disorder - PTA medications include aripiprazole, clozapine 100 mg nightly, fluoxetine 60 mg daily, lamotrigine 100 mg nightly, ?  Quetiapine, suvorexant 10 mg nightly.  Plan to continue all PTA medications.    # GERD-continue PTA PPI  # Migraine disorder-PT medications include amitriptyline, topiramate  # Hyperlipidemia-PTA medication includes atorvastatin  # Hypertension-PTA medication includes Bumex, Coreg 25 mg twice daily.  Resume PTA Bumex and continue PTA Coreg.          Diet: Regular Diet Adult  Diet    DVT Prophylaxis: Defer to primary service  Vincent Catheter: Not present  Lines: None     Cardiac Monitoring: None  Code Status: Full Code      Clinically Significant Risk Factors                   # Hypertension: Noted on problem list           # DMII: A1C = 8.3 % (Ref range: 0.0  "- 5.6 %) within past 6 months   # Obesity: Estimated body mass index is 38.27 kg/m  as calculated from the following:    Height as of 7/12/25: 1.651 m (5' 5\").    Weight as of 7/12/25: 104.3 kg (230 lb)., PRESENT ON ADMISSION     # Financial/Environmental Concerns: none  # Asthma: noted on problem list        Social Drivers of Health    Tobacco Use: Medium Risk (7/1/2025)    Patient History     Smoking Tobacco Use: Former     Smokeless Tobacco Use: Never     Passive Exposure: Yes   Physical Activity: Inactive (1/2/2025)    Exercise Vital Sign     Days of Exercise per Week: 1 day     Minutes of Exercise per Session: 0 min   Stress: Stress Concern Present (1/2/2025)    [employer] [place] [employer] - Occupational Stress Questionnaire     Feeling of Stress : To some extent   Social Connections: Unknown (1/2/2025)    Social Connection and Isolation Panel [NHANES]     Frequency of Social Gatherings with Friends and Family: More than three times a week          Disposition Plan     Medically Ready for Discharge: Anticipated Today             GERTRUDE MCRAE MD  Hospitalist Service, GOLD TEAM [address] [place] [address]  Securely message with [employer] (more info)  Text page via [facility] Paging/Directory   See signed in provider for up to date coverage information  ______________________________________________________________________    Interval History   Patient remains afebrile and hemodynamically stable.  There were no acute issues overnight.  Plan is for discharge to TCU today.  Patient reports adequate pain control.    Physical Exam   Vital Signs: Temp: 98.7  F (37.1  C) Temp src: Oral BP: (!) 163/66 Pulse: 82   Resp: 16 SpO2: 94 % O2 Device: None (Room air)    Weight: 0 lbs 0 oz    General Appearance: Sitting comfortably at the edge of the bed, in no acute distress or discomfort.  HEENT: PERRL: EOMI; moist mucous membrane w/o lesions  Neck: No JVD  Pulmonary: Clear to " auscultation bilaterally, no wheezes or crackles  CVS: Regular rhythm, no murmurs, rubs or gallops  GI: BS (+), soft nontender, no rebound or guarding   Extremities: Right lower extremity in Ace wrap.  Skin: No rashes or lesions  Neurologic: A&O x3      Medical Decision Making       45 MINUTES SPENT BY ME on the date of service doing chart review, history, exam, documentation & further activities per the note.      Data   ------------------------- PAST 24 HR DATA REVIEWED -----------------------------------------------        Imaging results reviewed over the past 24 hrs:   No results found for this or any previous visit (from the past 24 hours).

## 2025-07-16 NOTE — PLAN OF CARE
Goal Outcome Evaluation:      Plan of Care Reviewed With: patient    Overall Patient Progress: no changeOverall Patient Progress: no change       VS: Temp: 98.4  F (36.9  C) Temp src: Oral BP: (!) 152/55 Pulse: 75   Resp: 16 SpO2: 94 % O2 Device: None (Room air)     O2: 2L NC, denies SOB and chest pain    Output: Continent of B/B   Last BM: 7/11/25   Activity: Ax1 with walker, GB  NWB to RLE    Skin: RLE surgical incision    Pain: Managed with PRN PO dilaudid    CMS: A&Ox4, N/T to RLE    Dressing: RLE CDI    Diet: Regular    LDA: R PIV SL    Equipment: Personal belongings, IV pole, GB, walker   Plan: Pending TCU placement    Additional Info:

## 2025-07-17 ENCOUNTER — HOSPITAL ENCOUNTER (EMERGENCY)
Facility: CLINIC | Age: 40
Discharge: HOME | End: 2025-07-17
Attending: EMERGENCY MEDICINE | Admitting: EMERGENCY MEDICINE
Payer: COMMERCIAL

## 2025-07-17 ENCOUNTER — TELEPHONE (OUTPATIENT)
Dept: FAMILY MEDICINE | Facility: CLINIC | Age: 40
End: 2025-07-17

## 2025-07-17 ENCOUNTER — PATIENT OUTREACH (OUTPATIENT)
Dept: CARE COORDINATION | Facility: CLINIC | Age: 40
End: 2025-07-17

## 2025-07-17 ENCOUNTER — DOCUMENTATION ONLY (OUTPATIENT)
Dept: GERIATRICS | Facility: CLINIC | Age: 40
End: 2025-07-17

## 2025-07-17 VITALS
DIASTOLIC BLOOD PRESSURE: 55 MMHG | SYSTOLIC BLOOD PRESSURE: 140 MMHG | TEMPERATURE: 98.2 F | WEIGHT: 230 LBS | RESPIRATION RATE: 12 BRPM | HEIGHT: 65 IN | HEART RATE: 76 BPM | OXYGEN SATURATION: 91 % | BODY MASS INDEX: 38.32 KG/M2

## 2025-07-17 DIAGNOSIS — S82.201A TIBIA/FIBULA FRACTURE, RIGHT, CLOSED, INITIAL ENCOUNTER: ICD-10-CM

## 2025-07-17 DIAGNOSIS — G89.18 ACUTE POST-OPERATIVE PAIN: ICD-10-CM

## 2025-07-17 DIAGNOSIS — S82.401A TIBIA/FIBULA FRACTURE, RIGHT, CLOSED, INITIAL ENCOUNTER: ICD-10-CM

## 2025-07-17 LAB
ALBUMIN SERPL BCG-MCNC: 3.4 G/DL (ref 3.5–5.2)
ALP SERPL-CCNC: 227 U/L (ref 40–150)
ALT SERPL W P-5'-P-CCNC: 28 U/L (ref 0–50)
ANION GAP SERPL CALCULATED.3IONS-SCNC: 11 MMOL/L (ref 7–15)
AST SERPL W P-5'-P-CCNC: 42 U/L (ref 0–45)
BASOPHILS # BLD AUTO: 0.1 10E3/UL (ref 0–0.2)
BASOPHILS NFR BLD AUTO: 1 %
BILIRUB SERPL-MCNC: 0.2 MG/DL
BUN SERPL-MCNC: 30.3 MG/DL (ref 6–20)
CALCIUM SERPL-MCNC: 9.1 MG/DL (ref 8.8–10.4)
CHLORIDE SERPL-SCNC: 103 MMOL/L (ref 98–107)
CREAT SERPL-MCNC: 1.48 MG/DL (ref 0.51–0.95)
EGFRCR SERPLBLD CKD-EPI 2021: 46 ML/MIN/1.73M2
EOSINOPHIL # BLD AUTO: 0.5 10E3/UL (ref 0–0.7)
EOSINOPHIL NFR BLD AUTO: 3 %
ERYTHROCYTE [DISTWIDTH] IN BLOOD BY AUTOMATED COUNT: 17.2 % (ref 10–15)
GLUCOSE SERPL-MCNC: 329 MG/DL (ref 70–99)
HCO3 SERPL-SCNC: 24 MMOL/L (ref 22–29)
HCT VFR BLD AUTO: 33.5 % (ref 35–47)
HGB BLD-MCNC: 9.8 G/DL (ref 11.7–15.7)
HOLD SPECIMEN: NORMAL
IMM GRANULOCYTES # BLD: 0.3 10E3/UL
IMM GRANULOCYTES NFR BLD: 1 %
LYMPHOCYTES # BLD AUTO: 3.7 10E3/UL (ref 0.8–5.3)
LYMPHOCYTES NFR BLD AUTO: 20 %
MCH RBC QN AUTO: 26.8 PG (ref 26.5–33)
MCHC RBC AUTO-ENTMCNC: 29.3 G/DL (ref 31.5–36.5)
MCV RBC AUTO: 92 FL (ref 78–100)
MONOCYTES # BLD AUTO: 1.1 10E3/UL (ref 0–1.3)
MONOCYTES NFR BLD AUTO: 6 %
NEUTROPHILS # BLD AUTO: 13 10E3/UL (ref 1.6–8.3)
NEUTROPHILS NFR BLD AUTO: 70 %
NRBC # BLD AUTO: 0.1 10E3/UL
NRBC BLD AUTO-RTO: 1 /100
PLATELET # BLD AUTO: 428 10E3/UL (ref 150–450)
POTASSIUM SERPL-SCNC: 4.4 MMOL/L (ref 3.4–5.3)
PROT SERPL-MCNC: 7.7 G/DL (ref 6.4–8.3)
RBC # BLD AUTO: 3.66 10E6/UL (ref 3.8–5.2)
SODIUM SERPL-SCNC: 138 MMOL/L (ref 135–145)
WBC # BLD AUTO: 18.6 10E3/UL (ref 4–11)

## 2025-07-17 PROCEDURE — 250N000011 HC RX IP 250 OP 636: Performed by: EMERGENCY MEDICINE

## 2025-07-17 PROCEDURE — 99284 EMERGENCY DEPT VISIT MOD MDM: CPT | Mod: 25 | Performed by: EMERGENCY MEDICINE

## 2025-07-17 PROCEDURE — 36415 COLL VENOUS BLD VENIPUNCTURE: CPT | Performed by: EMERGENCY MEDICINE

## 2025-07-17 PROCEDURE — 96375 TX/PRO/DX INJ NEW DRUG ADDON: CPT

## 2025-07-17 PROCEDURE — 82310 ASSAY OF CALCIUM: CPT | Performed by: EMERGENCY MEDICINE

## 2025-07-17 PROCEDURE — 96374 THER/PROPH/DIAG INJ IV PUSH: CPT

## 2025-07-17 PROCEDURE — 85004 AUTOMATED DIFF WBC COUNT: CPT | Performed by: EMERGENCY MEDICINE

## 2025-07-17 PROCEDURE — 99284 EMERGENCY DEPT VISIT MOD MDM: CPT | Performed by: EMERGENCY MEDICINE

## 2025-07-17 RX ORDER — HYDROMORPHONE HYDROCHLORIDE 1 MG/ML
0.5 INJECTION, SOLUTION INTRAMUSCULAR; INTRAVENOUS; SUBCUTANEOUS
Refills: 0 | Status: DISCONTINUED | OUTPATIENT
Start: 2025-07-17 | End: 2025-07-17 | Stop reason: HOSPADM

## 2025-07-17 RX ORDER — HYDROMORPHONE HYDROCHLORIDE 2 MG/1
2 TABLET ORAL EVERY 4 HOURS PRN
Qty: 30 TABLET | Refills: 0 | Status: SHIPPED | OUTPATIENT
Start: 2025-07-17

## 2025-07-17 RX ORDER — KETOROLAC TROMETHAMINE 15 MG/ML
15 INJECTION, SOLUTION INTRAMUSCULAR; INTRAVENOUS ONCE
Status: COMPLETED | OUTPATIENT
Start: 2025-07-17 | End: 2025-07-17

## 2025-07-17 RX ADMIN — KETOROLAC TROMETHAMINE 15 MG: 15 INJECTION, SOLUTION INTRAMUSCULAR; INTRAVENOUS at 01:40

## 2025-07-17 RX ADMIN — HYDROMORPHONE HYDROCHLORIDE 0.5 MG: 1 INJECTION, SOLUTION INTRAMUSCULAR; INTRAVENOUS; SUBCUTANEOUS at 01:40

## 2025-07-17 ASSESSMENT — COLUMBIA-SUICIDE SEVERITY RATING SCALE - C-SSRS
1. IN THE PAST MONTH, HAVE YOU WISHED YOU WERE DEAD OR WISHED YOU COULD GO TO SLEEP AND NOT WAKE UP?: NO
6. HAVE YOU EVER DONE ANYTHING, STARTED TO DO ANYTHING, OR PREPARED TO DO ANYTHING TO END YOUR LIFE?: NO
2. HAVE YOU ACTUALLY HAD ANY THOUGHTS OF KILLING YOURSELF IN THE PAST MONTH?: NO

## 2025-07-17 ASSESSMENT — ACTIVITIES OF DAILY LIVING (ADL)
ADLS_ACUITY_SCORE: 56

## 2025-07-17 ASSESSMENT — ENCOUNTER SYMPTOMS
FEVER: 0
FATIGUE: 0
NUMBNESS: 0
APPETITE CHANGE: 0
WEAKNESS: 0

## 2025-07-17 NOTE — ED NOTES
Bed: ED04  Expected date: 7/17/25  Expected time: 12:57 AM  Means of arrival: Ambulance ([place] [address])  Comments:

## 2025-07-17 NOTE — ED TRIAGE NOTES
Patient arrives by EMS for right leg pain. Coming from [facility] TCU. Patient reports pain medications have not been helping her pain.    Received 50 mcg of fentanyl intranasal.      Patient has insulin pump.     Dolly Peoples RN on 7/17/2025 at 12:58 AM

## 2025-07-17 NOTE — ED NOTES
NOC RN attempted to call report to [facility] prior to patient discharge with no answer.   negative Regular rate & rhythm, normal S1, S2; no murmurs, gallops or rubs; no S3, S4

## 2025-07-17 NOTE — LETTER
[facility]   To:   [facility]          Please give to facility    From: Care Coordinator with [facility]     Patient Name:  Divina Delgadillo YOB: 1985   Admit date: 7/16/25      *Information Needed:  Please contact me when the patient will discharge (or if they will move to long term care)- include the discharge date, disposition, and main diagnosis. We work with patients after discharge from their primary care clinic setting.   If the patient is discharged with home care services, please provide the name of the agency    Ok to Phone or Email with information       Thank You,   Madison Stone, RN  Care Coordinator  [facility]  [email or web address]  [phone]

## 2025-07-17 NOTE — PROGRESS NOTES
Clinic Care Coordination Contact  Care Coordination Transition Communication    Clinical Data: Patient was hospitalized at [facility] from 7/13/25 to 7/16/25 with diagnosis of fall w/ fracture.     Assessment: Patient has transitioned to TCU for short term rehabilitation:    Facility Name: [facility]  Transition Communication:  Notified facility of Primary Care- Care Coordination support via Epic fax.    Plan: Care Coordinator will await notification from facility staff informing of patient's discharge plans/needs. Care Coordinator will review chart and outreach to facility staff every 2-4 weeks and as needed.     SHERIE Goodman   Social Work Primary Care Clinic Care Coordinator   [facility]  Covering for Madison Stone RN CC

## 2025-07-17 NOTE — RESULT ENCOUNTER NOTE
"Fito Delgadillo,      Your heart monitor results were not clinically significant. I'm going to order some additional urine and blood work on you for further evaluation and I'll also place an order for a carotid ultrasound given the difference in your blood pressure between your left and right arms in the setting of      Please call [phone] and speak \"imaging\" to the interactive voice response system to schedule an appointment for your carotid ultrasound.      Sincerely,      Chyna Dawson MD              "

## 2025-07-17 NOTE — TELEPHONE ENCOUNTER
Tony, RN from [facility], is calling and asking for an activation for Dr. Acevedo at the TCU to see patient. Patient is needing a script for Hydromorphone as she is a new admit. Upon discussing Fa noted that he was communicating with the wrong triage group. Nothing needed at this time from the [facility].    Mayelin Eric RN  [facility]

## 2025-07-17 NOTE — ED PROVIDER NOTES
History     Chief Complaint   Patient presents with    Leg Pain     Right       HPI  Divina Delgadillo is a 40 year old female wh who is 4 days status post surgery on her right ankle for a distal tibia/fibular fracture repair presenting for evaluation of increased pain in her ankle and leg.  Patient was admitted to [facility] today and states that she missed a dose of her pain medication.  She reports her pain got progressively worse and when she did finally get her medication it did not seem to help the pain at all so she came in for evaluation.  Denies any new swelling, numbness, or tingling.  Reports normal sensation in her foot.  Still able to move her toes.  Denies any ambulation with her foot.  Denies fevers or chills.  Presents via EMS for evaluation of uncontrolled pain.  Did receive 50 mcg of fentanyl before coming in with some improvement.    Allergies:  Allergies   Allergen Reactions    Gabapentin Other (See Comments)     Interfered with psych meds causing angry episode    Oxycodone-Acetaminophen Other (See Comments)     weston Bustillos       Problem List:    Patient Active Problem List    Diagnosis Date Noted    Fracture 07/13/2025     Priority: Medium    Mirena IUD 4/7/25 04/07/2025     Priority: Medium     Clinic Administered Medication Documentation      Intrauterine/Implant Insertion Documentation    Device was placed by provider (please see MAR for given by information). Please see MAR and medication order for additional information.     Type: Mirena  Remove/Replace by: 04/7/2033    Expiration Date:  05/2027          Secondary renal hyperparathyroidism 10/30/2024     Priority: Medium    Peroneal tendinitis of left lower extremity 06/14/2024     Priority: Medium    Acute left ankle pain 06/14/2024     Priority: Medium    Avascular necrosis of bone of ankle (H) 06/14/2024     Priority: Medium    Other fracture of left femur, initial encounter for closed fracture (H) 01/21/2024     Priority: Medium     Chiari malformation type I (H) 06/12/2023     Priority: Medium    Ulnar neuropathy of both upper extremities 09/20/2022     Priority: Medium    Insomnia, unspecified type 08/30/2021     Priority: Medium    History of DVT of arm  (deep vein thrombosis) 01/23/2021     Priority: Medium     ultrasound 1/6/2017-  venous thrombus in the antecubital fossa region and the left forearm as described      Schizoaffective disorder, depressive type (H) 07/19/2019     Priority: Medium    Acquired asplenia 03/22/2019     Priority: Medium    Chronic leukocytosis 11/27/2018     Priority: Medium     Due to spleen removal      Nicotine abuse 08/13/2018     Priority: Medium    Mild intermittent asthma with acute exacerbation 01/16/2018     Priority: Medium    Morbid obesity (H) 01/09/2018     Priority: Medium    IUD (intrauterine device) in place 07/26/2017     Priority: Medium     Mirena IUD inserted in office with premed 7/26/2017        Cervical high risk HPV (human papillomavirus) test positive 06/20/2017     Priority: Medium     6/15/17 NIL, +HR HPV (not 16/18). Plan cotest in 1 year  6/14/18 NIL pap, neg HPV. Plan cotest in 3 years  3/26/21 NIL pap, Neg HPV. Plan cotest in 3 years.   4/24/24 NIL pap, +HR HPV (not 16/18). Plan: cotest in 1 year  4/7/25 NIL pap, Neg HPV. 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, splenectomy, left adrenalectomy 02/26/2015;      Type 2 diabetes mellitus with stage 3 chronic kidney disease, with long-term current use of insulin (H) 02/27/2017     Priority: Medium    Bipolar disorder (H) 02/27/2017     Priority: Medium    Orthostatic hypotension 01/10/2017     Priority: Medium    Tobacco abuse 01/10/2017     Priority: Medium    Long term (current) use of anticoagulants 01/09/2017     Priority: Medium    Deep venous thrombosis of arm (H) 01/09/2017     Priority: Medium     Stage 3 chronic kidney disease 12/03/2015     Priority: Medium     Overview:   Updated per 10/1/17 IMO import      Leukocytosis, unspecified type 08/28/2015     Priority: Medium    Vitamin D insufficiency 06/01/2015     Priority: Medium    Cardiac murmur 08/20/2013     Priority: Medium    Depressive disorder 09/15/2012     Priority: Medium    Hyperlipidemia LDL goal <100 10/31/2010     Priority: Medium    Borderline personality disorder (H) 11/06/2009     Priority: Medium    Essential hypertension 06/13/2008     Priority: Medium    NAFL (nonalcoholic fatty liver) 04/11/2006     Priority: Medium     See flowsheet for hepatic panel.   Stopped zocor, was on godon as well had right upper quandrant ultrasound and ct  ? Psych med related vs SAHNI      Esophageal reflux 04/14/2005     Priority: Medium     GERD      PTSD (post-traumatic stress disorder) 01/01/2001     Priority: Medium        Past Medical History:    Past Medical History:   Diagnosis Date    Acquired asplenia     Acute pain of left knee 03/22/2019    Acute-on-chronic kidney injury 03/22/2019    ARF (acute renal failure) 03/23/2019    Asthma     Bipolar disorder (H)     Cardiac murmur     Cervical high risk HPV (human papillomavirus) test positive 06/20/2017    Chiari malformation type I (H)     Chronic bilateral low back pain without sciatica     Deep venous thrombosis of arm (H) 01/06/2017    Depressive disorder     DVT (deep venous thrombosis) (H)     DVT of upper extremity (deep vein thrombosis) (H) 2021    Esophageal reflux     Hypertension     Insomnia     Leukocytosis     Mild intermittent asthma     Morbid obesity (H)     Mucinous neoplasm of pancreas     NAFL (nonalcoholic fatty liver)     Neck pain     Nicotine abuse     Other specified types of schizophrenia, unspecified condition     Schizoaffective disorder, depressive type (H)     Stage 3a chronic kidney disease (CKD) (H)     Type 2 diabetes mellitus (H)     Ulnar neuropathy of both upper  extremities     Vitamin D insufficiency        Past Surgical History:    Past Surgical History:   Procedure Laterality Date    ADRENALECTOMY Left 2015    BIOPSY      BREAST SURGERY  2013    COLONOSCOPY      ENT SURGERY  10/01/2000    Tympanoplasty    IR LIVER BIOPSY PERCUTANEOUS  11/14/2019    OPEN REDUCTION INTERNAL FIXATION FIBULA Right 7/13/2025    Procedure: Open reduction internal fixation of right fibular fracture with distal fibula plate and screw construct;  Surgeon: Veto Bain MD;  Location: [facility] OR    OPEN REDUCTION INTERNAL FIXATION TIBIA Right 7/13/2025    Procedure: Open reduction internal fixation of right tibia fracture with intramedullary ulysses fixation;  Surgeon: Veto Bain MD;  Location: [facility] OR    PANCREATECTOMY PARTIAL  2015    PERCUTANEOUS BIOPSY LIVER Right 11/14/2019    Procedure: Percutaneous Liver Biopsy;  Surgeon: Sean Guzman PA-C;  Location: [facility] OR    SPLENECTOMY  2015    TONSILLECTOMY  10/01/2000    Tonsillectomy       Family History:    Family History   Problem Relation Age of Onset    Respiratory Mother         ASTHMA    Allergies Mother     Depression Mother     Chronic Obstructive Pulmonary Disease Mother     Anxiety Disorder Mother     Mental Illness Mother     Asthma Mother     Heart Disease Father         HEART VALVE REPLACEMENT    Hypertension Father     Diabetes Father     Depression Father     Anxiety Disorder Father     Mental Illness Father     Obesity Father     Respiratory Sister     Allergies Sister     Depression Sister     Respiratory Sister     Allergies Sister     Depression Sister     Respiratory Brother     Allergies Brother     Sleep Apnea Maternal Grandfather     Kidney Disease No family hx of     Liver Disease No family hx of     Liver Cancer No family hx of        Social History:  Marital Status:  Single [1]  Social History     Tobacco Use    Smoking status: Former     Current packs/day: 0.00     Average packs/day: 0.5 packs/day for 1.5  "years (0.8 ttl pk-yrs)     Types: Cigarettes     Start date: 2023     Quit date: 2025     Years since quittin.1     Passive exposure: Yes    Smokeless tobacco: Never    Tobacco comments:     E- cig and cigarettes (2-3 cigarettes per day)   Vaping Use    Vaping status: Every Day    Substances: Nicotine, Flavoring    Devices: Disposable   Substance Use Topics    Alcohol use: No     Comment: sober since     Drug use: No        Medications:    albuterol (VENTOLIN HFA) 108 (90 Base) MCG/ACT inhaler  amitriptyline (ELAVIL) 10 MG tablet  ARIPiprazole (ABILIFY) 15 MG tablet  aspirin (ASA) 81 MG EC tablet  atorvastatin (LIPITOR) 20 MG tablet  biotin (BIOTIN MAXIMUM STRENGTH) 5 MG CAPS  bumetanide (BUMEX) 0.5 MG tablet  carvedilol (COREG) 12.5 MG tablet  cloZAPine (CLOZARIL) 50 MG tablet  docusate sodium (COLACE) 100 MG capsule  FLUoxetine (PROZAC) 20 MG capsule  HYDROmorphone (DILAUDID) 2 MG tablet  hydrOXYzine HCl (ATARAX) 25 MG tablet  insulin aspart (NOVOLOG VIAL) 100 UNITS/ML vial  lamoTRIgine (LAMICTAL) 100 MG tablet  levonorgestrel (MIRENA) 52 MG (20 mcg/day) IUD  loratadine (CLARITIN) 10 MG tablet  LORazepam (ATIVAN) 0.5 MG tablet  metoclopramide (REGLAN) 10 MG tablet  omeprazole (PRILOSEC) 20 MG DR capsule  polyethylene glycol (MIRALAX) 17 GM/Dose powder  QUEtiapine (SEROQUEL) 25 MG tablet  senna-docusate (SENOKOT-S/PERICOLACE) 8.6-50 MG tablet  Suvorexant (BELSOMRA) 10 MG tablet  topiramate (TOPAMAX) 25 MG tablet          Review of Systems   Constitutional:  Negative for appetite change, fatigue and fever.   Cardiovascular:  Negative for leg swelling.   Musculoskeletal:         Right ankle pain   Neurological:  Negative for weakness and numbness.   All other systems reviewed and are negative.      Physical Exam   BP: (!) 169/71  Pulse: 95  Temp: 98.2  F (36.8  C)  Resp: 14  Height: 165.1 cm (5' 5\")  Weight: 104.3 kg (230 lb)  SpO2: 94 %      Physical Exam  Vitals and nursing note reviewed. "   Constitutional:       Appearance: Normal appearance. She is not ill-appearing or diaphoretic.   HENT:      Head: Atraumatic.   Cardiovascular:      Rate and Rhythm: Normal rate.      Pulses: Normal pulses.   Pulmonary:      Effort: Pulmonary effort is normal.   Musculoskeletal:         General: No swelling.      Right lower leg: No edema.      Comments: Postoperative splint in place.  Normal sensation and movement of the toes.  Brisk capillary refill present.  Toes are of normal temperature and symmetric to the left   Skin:     General: Skin is warm and dry.      Capillary Refill: Capillary refill takes less than 2 seconds.   Neurological:      Mental Status: She is alert and oriented to person, place, and time.      Sensory: No sensory deficit.      Motor: No weakness.   Psychiatric:         Mood and Affect: Mood normal.         ED Course        Procedures                  Recent Results (from the past 24 hours)   Glucose by meter   Result Value Ref Range    GLUCOSE BY METER POCT 100 (H) 70 - 99 mg/dL   Glucose by meter   Result Value Ref Range    GLUCOSE BY METER POCT 265 (H) 70 - 99 mg/dL   Comprehensive metabolic panel   Result Value Ref Range    Sodium 138 135 - 145 mmol/L    Potassium 4.4 3.4 - 5.3 mmol/L    Carbon Dioxide (CO2) 24 22 - 29 mmol/L    Anion Gap 11 7 - 15 mmol/L    Urea Nitrogen 30.3 (H) 6.0 - 20.0 mg/dL    Creatinine 1.48 (H) 0.51 - 0.95 mg/dL    GFR Estimate 46 (L) >60 mL/min/1.73m2    Calcium 9.1 8.8 - 10.4 mg/dL    Chloride 103 98 - 107 mmol/L    Glucose 329 (H) 70 - 99 mg/dL    Alkaline Phosphatase 227 (H) 40 - 150 U/L    AST 42 0 - 45 U/L    ALT 28 0 - 50 U/L    Protein Total 7.7 6.4 - 8.3 g/dL    Albumin 3.4 (L) 3.5 - 5.2 g/dL    Bilirubin Total 0.2 <=1.2 mg/dL   CBC with platelets, differential    Narrative    The following orders were created for panel order CBC with platelets, differential.  Procedure                               Abnormality         Status                      ---------                               -----------         ------                     CBC with platelets and ...[[id number]]  Abnormal            Final result                 Please view results for these tests on the individual orders.   CBC with platelets and differential   Result Value Ref Range    WBC Count 18.6 (H) 4.0 - 11.0 10e3/uL    RBC Count 3.66 (L) 3.80 - 5.20 10e6/uL    Hemoglobin 9.8 (L) 11.7 - 15.7 g/dL    Hematocrit 33.5 (L) 35.0 - 47.0 %    MCV 92 78 - 100 fL    MCH 26.8 26.5 - 33.0 pg    MCHC 29.3 (L) 31.5 - 36.5 g/dL    RDW 17.2 (H) 10.0 - 15.0 %    Platelet Count 428 150 - 450 10e3/uL    % Neutrophils 70 %    % Lymphocytes 20 %    % Monocytes 6 %    % Eosinophils 3 %    % Basophils 1 %    % Immature Granulocytes 1 %    NRBCs per 100 WBC 1 (H) <1 /100    Absolute Neutrophils 13.0 (H) 1.6 - 8.3 10e3/uL    Absolute Lymphocytes 3.7 0.8 - 5.3 10e3/uL    Absolute Monocytes 1.1 0.0 - 1.3 10e3/uL    Absolute Eosinophils 0.5 0.0 - 0.7 10e3/uL    Absolute Basophils 0.1 0.0 - 0.2 10e3/uL    Absolute Immature Granulocytes 0.3 <=0.4 10e3/uL    Absolute NRBCs 0.1 10e3/uL   [facility] Draw    Narrative    The following orders were created for panel order [facility] Draw.  Procedure                               Abnormality         Status                     ---------                               -----------         ------                     Extra Blue Top Tube[[id number]]                             Final result                 Please view results for these tests on the individual orders.   Extra Blue Top Tube   Result Value Ref Range    Hold Specimen JI        Medications   HYDROmorphone (PF) (DILAUDID) injection 0.5 mg (0.5 mg Intravenous $Given 7/17/25 0140)   ketorolac (TORADOL) injection 15 mg (15 mg Intravenous $Given 7/17/25 0140)     3:50 AM: Patient reassessed.  Labs do show a leukocytosis which is slightly higher than it was previously but she has had an elevated count prior to surgery as well.   Hemoglobin similar to baseline.  Glucose elevated but not critically so.  Patient dozing comfortably.  Easily awoken.  Patient states that her pain is much better.  Patient had had some irregular breathing with the hydromorphone dose so had been put on nasal cannula oxygen.  Oxygenating well at the moment.  Will titrate off oxygen and observe on monitoring.    5:50 AM: Pain remains well-controlled.  Has not needed further oxygen.  Plan to discharge back to nursing home for continued management.  Encouraged leg elevation to reduce swelling    Assessments & Plan (with Medical Decision Making)  40-year-old 4 days post right ankle surgery presenting for evaluation of uncontrolled pain.  Had a prolonged delay in receiving her pain medicines and pain became out of control.  Not having any numbness, tingling, weakness, redness, warmth, or other red flag symptoms.  Treated with hydromorphone and ketorolac for pain control here and observed.  Patient became drowsy after the medications with some mild hypoventilation.  Required transient oxygen due to this and also likely some underlying sleep apnea.  Due to this she was observed for longer period of time in the ED but did have substantial pain control after this.  Once pain was controlled adequately and she was back to her normal breathing, patient discharged back to her care facility     I have reviewed the nursing notes.    I have reviewed the findings, diagnosis, plan and need for follow up with the patient.        New Prescriptions    No medications on file       Final diagnoses:   Acute post-operative pain       7/17/2025   [facility] EMERGENCY DEPT       Ta, Sean Blanton MD  07/17/25 2223

## 2025-07-17 NOTE — ED NOTES
Patient demonstrates proper use of incentive spirometer. Encouraged use 3-4 times per hour while awake. Patient verbalizes understanding.

## 2025-07-17 NOTE — RESULT ENCOUNTER NOTE
"Fito Delgadillo,     Your heart monitor results were not clinically significant. I'm going to order some additional urine and blood work on you for further evaluation and I'll also place an order for a carotid ultrasound given the difference in your blood pressure between your left and right arms in the setting of     Please call [phone] and speak \"imaging\" to the interactive voice response system to schedule an appointment for your carotid ultrasound.     Sincerely,     Chyna Dawson MD        "

## 2025-07-17 NOTE — DISCHARGE INSTRUCTIONS
Patient to keep your leg elevated to reduce swelling and pain.  Take your prescribed pain medicines as scheduled especially for the first few days.  Also be sure to take ibuprofen and Tylenol for baseline pain control as these can have additive effects for your pain control in addition to the opiates.

## 2025-07-19 ENCOUNTER — TELEPHONE (OUTPATIENT)
Dept: GERIATRICS | Facility: CLINIC | Age: 40
End: 2025-07-19
Payer: COMMERCIAL

## 2025-07-19 DIAGNOSIS — F41.9 ANXIETY: Primary | ICD-10-CM

## 2025-07-19 DIAGNOSIS — G47.9 SLEEP DISORDER: ICD-10-CM

## 2025-07-19 RX ORDER — LORAZEPAM 0.5 MG/1
0.5 TABLET ORAL EVERY 8 HOURS PRN
Qty: 12 TABLET | Refills: 0 | Status: SHIPPED | OUTPATIENT
Start: 2025-07-19 | End: 2025-07-21

## 2025-07-20 ENCOUNTER — LAB REQUISITION (OUTPATIENT)
Dept: LAB | Facility: CLINIC | Age: 40
End: 2025-07-20

## 2025-07-20 DIAGNOSIS — R76.11 NONSPECIFIC REACTION TO TUBERCULIN SKIN TEST WITHOUT ACTIVE TUBERCULOSIS: ICD-10-CM

## 2025-07-21 DIAGNOSIS — G47.9 SLEEP DISORDER: ICD-10-CM

## 2025-07-21 DIAGNOSIS — F41.9 ANXIETY: ICD-10-CM

## 2025-07-21 PROCEDURE — 86481 TB AG RESPONSE T-CELL SUSP: CPT | Performed by: FAMILY MEDICINE

## 2025-07-21 PROCEDURE — P9603 ONE-WAY ALLOW PRORATED MILES: HCPCS | Performed by: FAMILY MEDICINE

## 2025-07-21 PROCEDURE — 36415 COLL VENOUS BLD VENIPUNCTURE: CPT | Performed by: FAMILY MEDICINE

## 2025-07-21 RX ORDER — LORAZEPAM 0.5 MG/1
0.5 TABLET ORAL EVERY 8 HOURS PRN
Qty: 30 TABLET | Refills: 0 | Status: SHIPPED | OUTPATIENT
Start: 2025-07-21

## 2025-07-21 NOTE — TELEPHONE ENCOUNTER
DIAGNOSIS: 2 WEEK WOUND CHECK   APPOINTMENT DATE: 07/30/2025   NOTES STATUS DETAILS   DISCHARGE SUMMARY from hospital Internal 7/13/2025 - 7/16/2025 (3 days)  [facility]     DISCHARGE REPORT from the ER Internal 7/12/2025 - 7/13/2025 (5 hours)  [facility] Emergency Dept     OPERATIVE REPORT Internal 7/13/2025 -   Procedure(s) Performed:   Open reduction internal fixation of right tibia fracture with intramedullary ulysses fixation.  Open reduction internal fixation of right fibular fracture with distal fibula plate and screw construct.   (IMAGES & REPORTS) Internal

## 2025-07-22 ENCOUNTER — APPOINTMENT (OUTPATIENT)
Dept: ULTRASOUND IMAGING | Facility: CLINIC | Age: 40
End: 2025-07-22
Attending: EMERGENCY MEDICINE
Payer: COMMERCIAL

## 2025-07-22 ENCOUNTER — HOSPITAL ENCOUNTER (EMERGENCY)
Facility: CLINIC | Age: 40
Discharge: HOME | End: 2025-07-23
Attending: EMERGENCY MEDICINE
Payer: COMMERCIAL

## 2025-07-22 ENCOUNTER — TELEPHONE (OUTPATIENT)
Dept: FAMILY MEDICINE | Facility: CLINIC | Age: 40
End: 2025-07-22

## 2025-07-22 VITALS
RESPIRATION RATE: 18 BRPM | TEMPERATURE: 98.3 F | OXYGEN SATURATION: 95 % | HEART RATE: 75 BPM | SYSTOLIC BLOOD PRESSURE: 129 MMHG | DIASTOLIC BLOOD PRESSURE: 57 MMHG

## 2025-07-22 DIAGNOSIS — M79.604 PAIN OF RIGHT LOWER EXTREMITY: ICD-10-CM

## 2025-07-22 PROCEDURE — 250N000013 HC RX MED GY IP 250 OP 250 PS 637: Performed by: EMERGENCY MEDICINE

## 2025-07-22 PROCEDURE — 99284 EMERGENCY DEPT VISIT MOD MDM: CPT | Mod: 25 | Performed by: EMERGENCY MEDICINE

## 2025-07-22 PROCEDURE — 29515 APPLICATION SHORT LEG SPLINT: CPT | Mod: RT | Performed by: EMERGENCY MEDICINE

## 2025-07-22 PROCEDURE — 93971 EXTREMITY STUDY: CPT | Mod: RT

## 2025-07-22 PROCEDURE — 29515 APPLICATION SHORT LEG SPLINT: CPT | Mod: RT

## 2025-07-22 RX ORDER — HYDROMORPHONE HYDROCHLORIDE 2 MG/1
2 TABLET ORAL ONCE
Refills: 0 | Status: COMPLETED | OUTPATIENT
Start: 2025-07-22 | End: 2025-07-22

## 2025-07-22 RX ADMIN — HYDROMORPHONE HYDROCHLORIDE 2 MG: 2 TABLET ORAL at 22:40

## 2025-07-22 ASSESSMENT — ACTIVITIES OF DAILY LIVING (ADL): ADLS_ACUITY_SCORE: 56

## 2025-07-22 NOTE — TELEPHONE ENCOUNTER
Patient Quality Outreach    Patient is due for the following:   Diabetes -  A1C  Hypertension -  BP check    Action(s) Taken:   Schedule a Adult Preventative    Type of outreach:    Sent [employer] message.    Questions for provider review:    None         Hanna Willis MA  Chart routed to Care Team.

## 2025-07-23 ENCOUNTER — DOCUMENTATION ONLY (OUTPATIENT)
Dept: OTHER | Facility: CLINIC | Age: 40
End: 2025-07-23
Payer: COMMERCIAL

## 2025-07-23 LAB
GAMMA INTERFERON BACKGROUND BLD IA-ACNC: 0.04 IU/ML
M TB IFN-G BLD-IMP: NEGATIVE
M TB IFN-G CD4+ BCKGRND COR BLD-ACNC: 9.96 IU/ML
MITOGEN IGNF BCKGRD COR BLD-ACNC: 0.01 IU/ML
MITOGEN IGNF BCKGRD COR BLD-ACNC: 0.01 IU/ML
QUANTIFERON MITOGEN: 10 IU/ML
QUANTIFERON NIL TUBE: 0.04 IU/ML
QUANTIFERON TB1 TUBE: 0.05 IU/ML
QUANTIFERON TB2 TUBE: 0.05

## 2025-07-23 NOTE — ED PROVIDER NOTES
History     Chief Complaint   Patient presents with    Leg Swelling     HPI  Divina Delgadillo is a 40 year old female with a history of bipolar disorder and recent fall with tib-fib fracture status postsurgical repair who presents for evaluation of increasing right lower extremity pain.  The patient says that she is having increasing pain of her right lower extremity, feels numb in her toes.  She spoke with her orthopedic surgeon who is worried about blood clot and told her to come here for further evaluation.  She denies fevers, chest pain, shortness of breath, cough.  She has been using hydromorphone and acetaminophen for the pain.     I reviewed the patient's x-rays during surgery from 7/13/2025, multiple plates and screws used for repair.  I reviewed the CT of the ankle from 7/13/2025 showing a spiral oblique fracture through the right tibia.  Also with oblique fracture through the fibula distally.  I reviewed the x-ray of the right knee from 7/12/2025 showing proximal right fibular fracture    Allergies:  Allergies   Allergen Reactions    Gabapentin Other (See Comments)     Interfered with psych meds causing angry episode    Oxycodone-Acetaminophen Other (See Comments)     weston Bustillos       Problem List:    Patient Active Problem List    Diagnosis Date Noted    Fracture 07/13/2025     Priority: Medium    Mirena IUD 4/7/25 04/07/2025     Priority: Medium     Clinic Administered Medication Documentation      Intrauterine/Implant Insertion Documentation    Device was placed by provider (please see MAR for given by information). Please see MAR and medication order for additional information.     Type: Mirena  Remove/Replace by: 04/7/2033    Expiration Date:  05/2027          Secondary renal hyperparathyroidism 10/30/2024     Priority: Medium    Peroneal tendinitis of left lower extremity 06/14/2024     Priority: Medium    Acute left ankle pain 06/14/2024     Priority: Medium    Avascular necrosis of bone of  ankle (H) 06/14/2024     Priority: Medium    Other fracture of left femur, initial encounter for closed fracture (H) 01/21/2024     Priority: Medium    Chiari malformation type I (H) 06/12/2023     Priority: Medium    Ulnar neuropathy of both upper extremities 09/20/2022     Priority: Medium    Insomnia, unspecified type 08/30/2021     Priority: Medium    History of DVT of arm  (deep vein thrombosis) 01/23/2021     Priority: Medium     ultrasound 1/6/2017-  venous thrombus in the antecubital fossa region and the left forearm as described      Schizoaffective disorder, depressive type (H) 07/19/2019     Priority: Medium    Acquired asplenia 03/22/2019     Priority: Medium    Chronic leukocytosis 11/27/2018     Priority: Medium     Due to spleen removal      Nicotine abuse 08/13/2018     Priority: Medium    Mild intermittent asthma with acute exacerbation 01/16/2018     Priority: Medium    Morbid obesity (H) 01/09/2018     Priority: Medium    IUD (intrauterine device) in place 07/26/2017     Priority: Medium     Mirena IUD inserted in office with premed 7/26/2017        Cervical high risk HPV (human papillomavirus) test positive 06/20/2017     Priority: Medium     6/15/17 NIL, +HR HPV (not 16/18). Plan cotest in 1 year  6/14/18 NIL pap, neg HPV. Plan cotest in 3 years  3/26/21 NIL pap, Neg HPV. Plan cotest in 3 years.   4/24/24 NIL pap, +HR HPV (not 16/18). Plan: cotest in 1 year  4/7/25 NIL pap, Neg HPV. 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, splenectomy, left adrenalectomy 02/26/2015;      Type 2 diabetes mellitus with stage 3 chronic kidney disease, with long-term current use of insulin (H) 02/27/2017     Priority: Medium    Bipolar disorder (H) 02/27/2017     Priority: Medium    Orthostatic hypotension 01/10/2017     Priority: Medium    Tobacco abuse 01/10/2017     Priority: Medium     Long term (current) use of anticoagulants 01/09/2017     Priority: Medium    Deep venous thrombosis of arm (H) 01/09/2017     Priority: Medium    Stage 3 chronic kidney disease 12/03/2015     Priority: Medium     Overview:   Updated per 10/1/17 IMO import      Leukocytosis, unspecified type 08/28/2015     Priority: Medium    Vitamin D insufficiency 06/01/2015     Priority: Medium    Cardiac murmur 08/20/2013     Priority: Medium    Depressive disorder 09/15/2012     Priority: Medium    Hyperlipidemia LDL goal <100 10/31/2010     Priority: Medium    Borderline personality disorder (H) 11/06/2009     Priority: Medium    Essential hypertension 06/13/2008     Priority: Medium    NAFL (nonalcoholic fatty liver) 04/11/2006     Priority: Medium     See flowsheet for hepatic panel.   Stopped zocor, was on godon as well had right upper quandrant ultrasound and ct  ? Psych med related vs SAHNI      Esophageal reflux 04/14/2005     Priority: Medium     GERD      PTSD (post-traumatic stress disorder) 01/01/2001     Priority: Medium        Past Medical History:    Past Medical History:   Diagnosis Date    Acquired asplenia     Acute pain of left knee 03/22/2019    Acute-on-chronic kidney injury 03/22/2019    ARF (acute renal failure) 03/23/2019    Asthma     Bipolar disorder (H)     Cardiac murmur     Cervical high risk HPV (human papillomavirus) test positive 06/20/2017    Chiari malformation type I (H)     Chronic bilateral low back pain without sciatica     Deep venous thrombosis of arm (H) 01/06/2017    Depressive disorder     DVT (deep venous thrombosis) (H)     DVT of upper extremity (deep vein thrombosis) (H) 2021    Esophageal reflux     Hypertension     Insomnia     Leukocytosis     Mild intermittent asthma     Morbid obesity (H)     Mucinous neoplasm of pancreas     NAFL (nonalcoholic fatty liver)     Neck pain     Nicotine abuse     Other specified types of schizophrenia, unspecified condition     Schizoaffective  disorder, depressive type (H)     Stage 3a chronic kidney disease (CKD) (H)     Type 2 diabetes mellitus (H)     Ulnar neuropathy of both upper extremities     Vitamin D insufficiency        Past Surgical History:    Past Surgical History:   Procedure Laterality Date    ADRENALECTOMY Left 2015    BIOPSY      BREAST SURGERY  2013    COLONOSCOPY      ENT SURGERY  10/01/2000    Tympanoplasty    IR LIVER BIOPSY PERCUTANEOUS  11/14/2019    OPEN REDUCTION INTERNAL FIXATION FIBULA Right 7/13/2025    Procedure: Open reduction internal fixation of right fibular fracture with distal fibula plate and screw construct;  Surgeon: Veto Bain MD;  Location: [facility] OR    OPEN REDUCTION INTERNAL FIXATION TIBIA Right 7/13/2025    Procedure: Open reduction internal fixation of right tibia fracture with intramedullary ulysses fixation;  Surgeon: Veto Bain MD;  Location: [facility] OR    PANCREATECTOMY PARTIAL  2015    PERCUTANEOUS BIOPSY LIVER Right 11/14/2019    Procedure: Percutaneous Liver Biopsy;  Surgeon: Sean Guzman PA-C;  Location: [facility]    SPLENECTOMY  2015    TONSILLECTOMY  10/01/2000    Tonsillectomy       Family History:    Family History   Problem Relation Age of Onset    Respiratory Mother         ASTHMA    Allergies Mother     Depression Mother     Chronic Obstructive Pulmonary Disease Mother     Anxiety Disorder Mother     Mental Illness Mother     Asthma Mother     Heart Disease Father         HEART VALVE REPLACEMENT    Hypertension Father     Diabetes Father     Depression Father     Anxiety Disorder Father     Mental Illness Father     Obesity Father     Respiratory Sister     Allergies Sister     Depression Sister     Respiratory Sister     Allergies Sister     Depression Sister     Respiratory Brother     Allergies Brother     Sleep Apnea Maternal Grandfather     Kidney Disease No family hx of     Liver Disease No family hx of     Liver Cancer No family hx of        Social History:  Marital  Status:  Single [1]  Social History     Tobacco Use    Smoking status: Former     Current packs/day: 0.00     Average packs/day: 0.5 packs/day for 1.5 years (0.8 ttl pk-yrs)     Types: Cigarettes     Start date: 2023     Quit date: 2025     Years since quittin.1     Passive exposure: Yes    Smokeless tobacco: Never    Tobacco comments:     E- cig and cigarettes (2-3 cigarettes per day)   Vaping Use    Vaping status: Every Day    Substances: Nicotine, Flavoring    Devices: Disposable   Substance Use Topics    Alcohol use: No     Comment: sober since     Drug use: No        Medications:    albuterol (VENTOLIN HFA) 108 (90 Base) MCG/ACT inhaler  amitriptyline (ELAVIL) 10 MG tablet  ARIPiprazole (ABILIFY) 15 MG tablet  aspirin (ASA) 81 MG EC tablet  atorvastatin (LIPITOR) 20 MG tablet  biotin (BIOTIN MAXIMUM STRENGTH) 5 MG CAPS  bumetanide (BUMEX) 0.5 MG tablet  carvedilol (COREG) 12.5 MG tablet  cloZAPine (CLOZARIL) 50 MG tablet  docusate sodium (COLACE) 100 MG capsule  FLUoxetine (PROZAC) 20 MG capsule  HYDROmorphone (DILAUDID) 2 MG tablet  hydrOXYzine HCl (ATARAX) 25 MG tablet  insulin aspart (NOVOLOG VIAL) 100 UNITS/ML vial  lamoTRIgine (LAMICTAL) 100 MG tablet  levonorgestrel (MIRENA) 52 MG (20 mcg/day) IUD  loratadine (CLARITIN) 10 MG tablet  LORazepam (ATIVAN) 0.5 MG tablet  metoclopramide (REGLAN) 10 MG tablet  omeprazole (PRILOSEC) 20 MG DR capsule  polyethylene glycol (MIRALAX) 17 GM/Dose powder  QUEtiapine (SEROQUEL) 25 MG tablet  senna-docusate (SENOKOT-S/PERICOLACE) 8.6-50 MG tablet  Suvorexant (BELSOMRA) 10 MG tablet  topiramate (TOPAMAX) 25 MG tablet          Review of Systems    Physical Exam   BP: (!) 147/65  Pulse: 81  Temp: 98.3  F (36.8  C)  Resp: 18  SpO2: 93 %      Physical Exam  Constitutional:       General: She is not in acute distress.     Appearance: She is well-developed.   HENT:      Head: Normocephalic and atraumatic.   Cardiovascular:      Rate and Rhythm: Normal rate.       Pulses:           Dorsalis pedis pulses are 2+ on the right side.   Pulmonary:      Effort: No respiratory distress.      Breath sounds: No stridor.   Skin:     General: Skin is warm and dry.      Comments: The patient's dressing was removed from the right lower extremity, tissue is warm and well-perfused, able to move the toes without difficulty.  Minimal erythema around the surgical incisions.  No drainage or discharge.   Neurological:      Mental Status: She is alert.               ED Course        [facility]    Splint Application    Date/Time: 7/23/2025 12:59 AM    Performed by: Tyson Do MD  Authorized by: Tyson Do MD    Risks, benefits and alternatives discussed.      PRE-PROCEDURE DETAILS     Sensation:  Normal    PROCEDURE DETAILS     Laterality:  Right    Location:  Leg    Leg:  R lower leg    Splint type:  Short leg    Supplies:  Cotton padding, Ortho-Glass and elastic bandage    POST PROCEDURE DETAILS     Pain:  Improved    Sensation:  Normal      PROCEDURE    Patient Tolerance:  Patient tolerated the procedure well with no immediate complications                Critical Care time:  none     None         Recent Results (from the past 24 hours)   US Lower Extremity Venous Duplex Right    Narrative    EXAM: US LOWER EXTREMITY VENOUS DUPLEX RIGHT  LOCATION: [facility]  DATE: 7/22/2025    INDICATION: pain and swelling 1 week out from surgery  COMPARISON: None.  TECHNIQUE: Venous Duplex ultrasound of the right lower extremity with and without compression, augmentation and duplex. Color flow and spectral Doppler with waveform analysis performed.    FINDINGS: Exam includes the common femoral, femoral, popliteal, and contralateral common femoral veins as well as segmentally visualized deep calf veins and greater saphenous vein.     RIGHT: No deep vein thrombosis. No superficial thrombophlebitis. No popliteal cyst.      Impression     IMPRESSION:  1.  No deep venous thrombosis in the right lower extremity.       Medications   HYDROmorphone (DILAUDID) tablet 2 mg (2 mg Oral $Given 7/22/25 1790)       Assessments & Plan (with Medical Decision Making)   40-year-old female with recent ORIF of the right lower extremity presents with pain and swelling of the right lower extremity.  No signs of cellulitis or wound infection on exam.  The patient started to feel better once the dressings were removed.  She is given hydromorphone for the pain.  Ultrasound of the lower extremity obtained, images interpreted independently as well as radiology read reviewed, no signs of DVT.  On recheck she is feeling bit better.  The leg is resplinted as above and she is discharged back to her care facility with instructions to continue her home medications, return if worse, otherwise follow-up in clinic.  The patient is in agreement with this plan.    I have reviewed the nursing notes.    I have reviewed the findings, diagnosis, plan and need for follow up with the patient.             New Prescriptions    No medications on file       Final diagnoses:   Pain of right lower extremity       7/22/2025   [facility] EMERGENCY DEPT       Tyson Do MD  07/23/25 0102

## 2025-07-23 NOTE — DISCHARGE INSTRUCTIONS
Continue taking your prescribed medications for the pain.  Return for fevers, worsening symptoms, or other concerns.  Otherwise follow-up with your orthopedic surgeon.

## 2025-07-23 NOTE — ED TRIAGE NOTES
At [facility] post-op leg surgery last tuesday. Increased pain and tingling over the last day concerned for blood clot.

## 2025-07-24 ENCOUNTER — TRANSITIONAL CARE UNIT VISIT (OUTPATIENT)
Dept: GERIATRICS | Facility: CLINIC | Age: 40
End: 2025-07-24
Payer: COMMERCIAL

## 2025-07-24 ENCOUNTER — PATIENT OUTREACH (OUTPATIENT)
Dept: CARE COORDINATION | Facility: CLINIC | Age: 40
End: 2025-07-24
Payer: COMMERCIAL

## 2025-07-24 ENCOUNTER — DOCUMENTATION ONLY (OUTPATIENT)
Dept: OTHER | Facility: CLINIC | Age: 40
End: 2025-07-24

## 2025-07-24 VITALS
TEMPERATURE: 97.4 F | SYSTOLIC BLOOD PRESSURE: 132 MMHG | OXYGEN SATURATION: 97 % | HEART RATE: 68 BPM | HEIGHT: 65 IN | RESPIRATION RATE: 18 BRPM | DIASTOLIC BLOOD PRESSURE: 74 MMHG | BODY MASS INDEX: 37.49 KG/M2 | WEIGHT: 225 LBS

## 2025-07-24 DIAGNOSIS — S82.201A TIBIA/FIBULA FRACTURE, RIGHT, CLOSED, INITIAL ENCOUNTER: Primary | ICD-10-CM

## 2025-07-24 DIAGNOSIS — S82.401A TIBIA/FIBULA FRACTURE, RIGHT, CLOSED, INITIAL ENCOUNTER: Primary | ICD-10-CM

## 2025-07-24 ASSESSMENT — ACTIVITIES OF DAILY LIVING (ADL): DEPENDENT_IADLS:: INDEPENDENT

## 2025-07-24 NOTE — PROGRESS NOTES
Clinic Care Coordination Contact    Situation: Patient chart reviewed by care coordinator.    Background:   Patient was hospitalized at [facility] from 7/13/25 to 7/16/25 with diagnosis of fall w/ fracture.      Assessment: Patient has transitioned to TCU for short term rehabilitation:     Facility Name: [facility]    Assessment:    7/22/2025 - 7/23/2025 (2 hours)  [facility] Emergency Dept     Tyson Do MD  Last attending  Treatment team Pain of right lower extremity  Clinical impression Leg Swelling  Chief complaint       Plan/Recommendations: CC RN will follow up when the patient is discharged from  the TCU    [facility]   Madison Stone RN, Care Coordinator   [facility]'s   E-mail [email or web address]   [phone]

## 2025-07-24 NOTE — LETTER
7/24/2025      Divina Delgadillo  [address] [address] [address]        No notes on file      Sincerely,        Jamaal Cortez CNP    Electronically signed

## 2025-07-28 ENCOUNTER — TRANSITIONAL CARE UNIT VISIT (OUTPATIENT)
Dept: GERIATRICS | Facility: CLINIC | Age: 40
End: 2025-07-28
Payer: COMMERCIAL

## 2025-07-28 VITALS
HEIGHT: 65 IN | RESPIRATION RATE: 16 BRPM | SYSTOLIC BLOOD PRESSURE: 141 MMHG | WEIGHT: 225 LBS | TEMPERATURE: 97.7 F | HEART RATE: 82 BPM | BODY MASS INDEX: 37.49 KG/M2 | DIASTOLIC BLOOD PRESSURE: 86 MMHG | OXYGEN SATURATION: 94 %

## 2025-07-28 DIAGNOSIS — Z98.890 S/P ORIF (OPEN REDUCTION INTERNAL FIXATION) FRACTURE: Primary | ICD-10-CM

## 2025-07-28 DIAGNOSIS — Z79.4 TYPE 2 DIABETES MELLITUS WITH STAGE 3B CHRONIC KIDNEY DISEASE, WITH LONG-TERM CURRENT USE OF INSULIN (H): ICD-10-CM

## 2025-07-28 DIAGNOSIS — Z87.81 S/P ORIF (OPEN REDUCTION INTERNAL FIXATION) FRACTURE: Primary | ICD-10-CM

## 2025-07-28 DIAGNOSIS — E11.22 TYPE 2 DIABETES MELLITUS WITH STAGE 3B CHRONIC KIDNEY DISEASE, WITH LONG-TERM CURRENT USE OF INSULIN (H): ICD-10-CM

## 2025-07-28 DIAGNOSIS — N18.32 TYPE 2 DIABETES MELLITUS WITH STAGE 3B CHRONIC KIDNEY DISEASE, WITH LONG-TERM CURRENT USE OF INSULIN (H): ICD-10-CM

## 2025-07-28 DIAGNOSIS — F60.3 BORDERLINE PERSONALITY DISORDER (H): Chronic | ICD-10-CM

## 2025-07-28 DIAGNOSIS — R42 VERTIGO: ICD-10-CM

## 2025-07-28 DIAGNOSIS — F25.1 SCHIZOAFFECTIVE DISORDER, DEPRESSIVE TYPE (H): Chronic | ICD-10-CM

## 2025-07-28 PROCEDURE — 99309 SBSQ NF CARE MODERATE MDM 30: CPT | Performed by: NURSE PRACTITIONER

## 2025-07-28 NOTE — PROGRESS NOTES
"                                                                                   [facility] GERIATRICS      Code Status:  FULL CODE  Visit Type: RECHECK     Facility:   [facility] ([facility]) [[id number]]         History of Present Illness: Divina Delgadillo is a 40 year old female with past medical history for chairi malformation, bipolar disorder, borderline personality, DM2 (insulin pump), tobacco abuse, HLD, depression, HTN, NAFL, GERD, PTSD, CKD3, pancreatic cancer s/p pancreatectomy, s/p spleenectomy.  She was recently hospitalized 7/13-7/16 for a fall with a subsequent right tib/fib fracture.  She has been having issues with vertigo which caused her fall. She underwent an ORIF.  Workup for vertigo recommended outpatient.      Today, I see her in follow up from ED visit on 7/23.  She was seen due to increased edema and pain of right LE.  DVT workup was negative.  She states today her pain is better controlled.  Her biggest complaint is the \"tightness\" of the splint.  She will be seeing orthopedics on 7/30.  She continues to have leukocytosis due to splenectomy.      Current Outpatient Medications   Medication Sig Dispense Refill    albuterol (VENTOLIN HFA) 108 (90 Base) MCG/ACT inhaler INHALE 2 PUFFS INTO THE LUNGS EVERY SIX  HOURS AS NEEDED FOR SHORTNESS OF BREATH 18 g 5    amitriptyline (ELAVIL) 10 MG tablet Take 1 tablet (10 mg) by mouth at bedtime. 90 tablet 1    ARIPiprazole (ABILIFY) 15 MG tablet Take 1 Tablet (15 mg) by mouth once daily in the evening.      aspirin (ASA) 81 MG EC tablet Take 2 tablets (162 mg) by mouth daily. 84 tablet 0    atorvastatin (LIPITOR) 20 MG tablet Take 1 tablet (20 mg) by mouth daily 90 tablet 0    biotin (BIOTIN MAXIMUM STRENGTH) 5 MG CAPS Take 1 tablet by mouth daily 100 capsule 0    bumetanide (BUMEX) 0.5 MG tablet Take 1 tablet (0.5 mg) by mouth daily 90 tablet 3    carvedilol (COREG) 12.5 MG tablet Take 1 tablet (12.5 mg) by mouth 2 times daily 60 tablet 11    " cloZAPine (CLOZARIL) 50 MG tablet Take 100 mg by mouth at bedtime.      docusate sodium (COLACE) 100 MG capsule Take 1 capsule (100 mg) by mouth 2 times daily as needed for constipation. 60 capsule 5    FLUoxetine (PROZAC) 20 MG capsule Take 3 Capsules (60 mg) by mouth once daily in the morning.      HYDROmorphone (DILAUDID) 2 MG tablet Take 1 tablet (2 mg) by mouth every 4 hours as needed for severe pain. 30 tablet 0    hydrOXYzine HCl (ATARAX) 25 MG tablet Take 25 mg by mouth 3 times daily as needed for anxiety.      insulin aspart (NOVOLOG VIAL) 100 UNITS/ML vial for use with continuous insulin pump Max TDD 80 20 mL 14    lamoTRIgine (LAMICTAL) 100 MG tablet Take 100 mg by mouth at bedtime.      levonorgestrel (MIRENA) 52 MG (20 mcg/day) IUD 1 each by Intrauterine route once.      loratadine (CLARITIN) 10 MG tablet Take 1 tablet (10 mg) by mouth every morning 30 tablet 11    LORazepam (ATIVAN) 0.5 MG tablet Take 1 tablet (0.5 mg) by mouth every 8 hours as needed (Ssevere vertigo). 30 tablet 0    metoclopramide (REGLAN) 10 MG tablet Take 1 tablet (10 mg) by mouth 4 times daily as needed (for nausea, dizziness or vertigo). 24 tablet 1    omeprazole (PRILOSEC) 20 MG DR capsule Take 1 capsule (20 mg) by mouth 2 times daily. 30 minutes before meal on empty stomach. 90 capsule 2    polyethylene glycol (MIRALAX) 17 GM/Dose powder Take 17 g by mouth daily. 510 g 0    QUEtiapine (SEROQUEL) 25 MG tablet       senna-docusate (SENOKOT-S/PERICOLACE) 8.6-50 MG tablet Take 1-2 tablets by mouth 2 times daily. 60 tablet     Suvorexant (BELSOMRA) 10 MG tablet Take 1 tablet (10 mg) by mouth at bedtime. 30 tablet 0    topiramate (TOPAMAX) 25 MG tablet Take 1 tablet (25 mg) by mouth 2 times daily. 60 tablet 2       Review of Systems   Patient denies fever, chills, headache, lightheadedness, dizziness, rhinorrhea, cough, congestion, shortness of breath, chest pain, palpitations, abdominal pain, n/v, diarrhea, constipation, change in  "appetite, change in sleep pattern, dysuria, frequency, burning or pain with urination.  Other than stated in HPI all other review of systems is negative.       Physical Exam  Vital signs:BP (!) 141/86   Pulse 82   Temp 97.7  F (36.5  C)   Resp 16   Ht 1.651 m (5' 5\")   Wt 102.1 kg (225 lb)   LMP  (LMP Unknown)   SpO2 94%   BMI 37.44 kg/m     GENERAL APPEARANCE: Well developed, well nourished, in no acute distress.  HEENT: normocephalic, atraumatic  sclerae anicteric, conjunctivae clear and moist, EOM intact  LUNGS: Lung sounds CTA, no adventitious sounds, respiratory effort normal.  CARD: RRR, S1, S2,2/6 systolic murmur, no gallops, rubs  ABD: Soft, nondistended and nontender with normal bowel sounds.    EXTREMITIES: right LE edematous above ace wrap.  Did not remove to evaluate the LE.  Good popliteal pulses.   NEURO: Alert and oriented x 3.  Face is symmetric.  SKIN:RLE covered.   PSYCH: euthymic        Labs:    Last Comprehensive Metabolic Panel:  Lab Results   Component Value Date     07/17/2025    POTASSIUM 4.4 07/17/2025    CHLORIDE 103 07/17/2025    CO2 24 07/17/2025    ANIONGAP 11 07/17/2025     (H) 07/17/2025    BUN 30.3 (H) 07/17/2025    CR 1.48 (H) 07/17/2025    GFRESTIMATED 46 (L) 07/17/2025    CLAY 9.1 07/17/2025       Lab Results   Component Value Date    WBC 18.6 07/17/2025    WBC 14.8 07/08/2021     Lab Results   Component Value Date    RBC 3.66 07/17/2025    RBC 4.21 07/08/2021     Lab Results   Component Value Date    HGB 9.8 07/17/2025    HGB 12.9 07/08/2021     Lab Results   Component Value Date    HCT 33.5 07/17/2025    HCT 39.8 07/08/2021     Lab Results   Component Value Date    MCV 92 07/17/2025    MCV 95 07/08/2021     Lab Results   Component Value Date    MCH 26.8 07/17/2025    MCH 30.6 07/08/2021     Lab Results   Component Value Date    MCHC 29.3 07/17/2025    MCHC 32.4 07/08/2021     Lab Results   Component Value Date    RDW 17.2 07/17/2025    RDW 14.3 07/08/2021 "     Lab Results   Component Value Date     07/17/2025     07/08/2021           Assessment/Plan:  S/P ORIF (open reduction internal fixation) fracture  Pain management has been difficult and has lead patient to the ED multiple times.  Advised patient to take hydroxyzine with the dilaudid to help potentiate the medication.   Continue with NWB and ASA daily.   Follow up with ortho on 7/30.     Type 1 diabetes with stage 3b chronic kidney disease, with long-term current use of insulin (H)  2/2 pancreatectomy   Has not been checking BS here.  Nursing will check BS QID. Has an insulin pump managed by endocrinology.    Metoclopramide for gastroparesis.     Vertigo  Having a CT of head on 8/1 and ECHO on 7/31.  States she has taken meclizine at home without improvement of symptoms.     Schizoaffective disorder, depressive type (H)  Borderline personality disorder (H)  Continue with PTA amitriptyline, Abilify, Clozaril, Prozac, Lamictal, hydroxyzine, Seroquel, Lorazepam and Topamax  Belsomra for sleep          Electronically signed by:Vanessa Baptiste, ENDER

## 2025-07-28 NOTE — LETTER
" 7/28/2025      Divina Delgadillo  [id number]  [facility] [address]                                                                                           [facility] GERIATRICS      Code Status:  FULL CODE  Visit Type: RECHECK     Facility:   [facility] ([facility] [[id number]]         History of Present Illness: Divina Delgadillo is a 40 year old female with past medical history for chairi malformation, bipolar disorder, borderline personality, DM2 (insulin pump), tobacco abuse, HLD, depression, HTN, NAFL, GERD, PTSD, CKD3, pancreatic cancer s/p pancreatectomy, s/p spleenectomy.  She was recently hospitalized 7/13-7/16 for a fall with a subsequent right tib/fib fracture.  She has been having issues with vertigo which caused her fall. She underwent an ORIF.  Workup for vertigo recommended outpatient.      Today, I see her in follow up from ED visit on 7/23.  She was seen due to increased edema and pain of right LE.  DVT workup was negative.  She states today her pain is better controlled.  Her biggest complaint is the \"tightness\" of the splint.  She will be seeing orthopedics on 7/30.  She continues to have leukocytosis due to splenectomy.      Current Outpatient Medications   Medication Sig Dispense Refill     albuterol (VENTOLIN HFA) 108 (90 Base) MCG/ACT inhaler INHALE 2 PUFFS INTO THE LUNGS EVERY SIX  HOURS AS NEEDED FOR SHORTNESS OF BREATH 18 g 5     amitriptyline (ELAVIL) 10 MG tablet Take 1 tablet (10 mg) by mouth at bedtime. 90 tablet 1     ARIPiprazole (ABILIFY) 15 MG tablet Take 1 Tablet (15 mg) by mouth once daily in the evening.       aspirin (ASA) 81 MG EC tablet Take 2 tablets (162 mg) by mouth daily. 84 tablet 0     atorvastatin (LIPITOR) 20 MG tablet Take 1 tablet (20 mg) by mouth daily 90 tablet 0     biotin (BIOTIN MAXIMUM STRENGTH) 5 MG CAPS Take 1 tablet by mouth daily 100 capsule 0     bumetanide (BUMEX) 0.5 MG tablet Take 1 tablet (0.5 mg) by mouth daily 90 tablet 3     " carvedilol (COREG) 12.5 MG tablet Take 1 tablet (12.5 mg) by mouth 2 times daily 60 tablet 11     cloZAPine (CLOZARIL) 50 MG tablet Take 100 mg by mouth at bedtime.       docusate sodium (COLACE) 100 MG capsule Take 1 capsule (100 mg) by mouth 2 times daily as needed for constipation. 60 capsule 5     FLUoxetine (PROZAC) 20 MG capsule Take 3 Capsules (60 mg) by mouth once daily in the morning.       HYDROmorphone (DILAUDID) 2 MG tablet Take 1 tablet (2 mg) by mouth every 4 hours as needed for severe pain. 30 tablet 0     hydrOXYzine HCl (ATARAX) 25 MG tablet Take 25 mg by mouth 3 times daily as needed for anxiety.       insulin aspart (NOVOLOG VIAL) 100 UNITS/ML vial for use with continuous insulin pump Max TDD 80 20 mL 14     lamoTRIgine (LAMICTAL) 100 MG tablet Take 100 mg by mouth at bedtime.       levonorgestrel (MIRENA) 52 MG (20 mcg/day) IUD 1 each by Intrauterine route once.       loratadine (CLARITIN) 10 MG tablet Take 1 tablet (10 mg) by mouth every morning 30 tablet 11     LORazepam (ATIVAN) 0.5 MG tablet Take 1 tablet (0.5 mg) by mouth every 8 hours as needed (Ssevere vertigo). 30 tablet 0     metoclopramide (REGLAN) 10 MG tablet Take 1 tablet (10 mg) by mouth 4 times daily as needed (for nausea, dizziness or vertigo). 24 tablet 1     omeprazole (PRILOSEC) 20 MG DR capsule Take 1 capsule (20 mg) by mouth 2 times daily. 30 minutes before meal on empty stomach. 90 capsule 2     polyethylene glycol (MIRALAX) 17 GM/Dose powder Take 17 g by mouth daily. 510 g 0     QUEtiapine (SEROQUEL) 25 MG tablet        senna-docusate (SENOKOT-S/PERICOLACE) 8.6-50 MG tablet Take 1-2 tablets by mouth 2 times daily. 60 tablet      Suvorexant (BELSOMRA) 10 MG tablet Take 1 tablet (10 mg) by mouth at bedtime. 30 tablet 0     topiramate (TOPAMAX) 25 MG tablet Take 1 tablet (25 mg) by mouth 2 times daily. 60 tablet 2       Review of Systems   Patient denies fever, chills, headache, lightheadedness, dizziness, rhinorrhea, cough,  "congestion, shortness of breath, chest pain, palpitations, abdominal pain, n/v, diarrhea, constipation, change in appetite, change in sleep pattern, dysuria, frequency, burning or pain with urination.  Other than stated in HPI all other review of systems is negative.       Physical Exam  Vital signs:BP (!) 141/86   Pulse 82   Temp 97.7  F (36.5  C)   Resp 16   Ht 1.651 m (5' 5\")   Wt 102.1 kg (225 lb)   LMP  (LMP Unknown)   SpO2 94%   BMI 37.44 kg/m     GENERAL APPEARANCE: Well developed, well nourished, in no acute distress.  HEENT: normocephalic, atraumatic  sclerae anicteric, conjunctivae clear and moist, EOM intact  LUNGS: Lung sounds CTA, no adventitious sounds, respiratory effort normal.  CARD: RRR, S1, S2,2/6 systolic murmur, no gallops, rubs  ABD: Soft, nondistended and nontender with normal bowel sounds.    EXTREMITIES: right LE edematous above ace wrap.  Did not remove to evaluate the LE.  Good popliteal pulses.   NEURO: Alert and oriented x 3.  Face is symmetric.  SKIN:RLE covered.   PSYCH: euthymic        Labs:    Last Comprehensive Metabolic Panel:  Lab Results   Component Value Date     07/17/2025    POTASSIUM 4.4 07/17/2025    CHLORIDE 103 07/17/2025    CO2 24 07/17/2025    ANIONGAP 11 07/17/2025     (H) 07/17/2025    BUN 30.3 (H) 07/17/2025    CR 1.48 (H) 07/17/2025    GFRESTIMATED 46 (L) 07/17/2025    CLAY 9.1 07/17/2025       Lab Results   Component Value Date    WBC 18.6 07/17/2025    WBC 14.8 07/08/2021     Lab Results   Component Value Date    RBC 3.66 07/17/2025    RBC 4.21 07/08/2021     Lab Results   Component Value Date    HGB 9.8 07/17/2025    HGB 12.9 07/08/2021     Lab Results   Component Value Date    HCT 33.5 07/17/2025    HCT 39.8 07/08/2021     Lab Results   Component Value Date    MCV 92 07/17/2025    MCV 95 07/08/2021     Lab Results   Component Value Date    MCH 26.8 07/17/2025    MCH 30.6 07/08/2021     Lab Results   Component Value Date    [facility]CHC 29.3 07/17/2025 "    [facility]     Lab Results   Component Value Date    RDW 17.2 07/17/2025    RDW 14.3 07/08/2021     Lab Results   Component Value Date     07/17/2025     07/08/2021           Assessment/Plan:  S/P ORIF (open reduction internal fixation) fracture  Pain management has been difficult and has lead patient to the ED multiple times.  Advised patient to take hydroxyzine with the dilaudid to help potentiate the medication.   Continue with NWB and ASA daily.   Follow up with ortho on 7/30.     Type 1 diabetes with stage 3b chronic kidney disease, with long-term current use of insulin (H)  2/2 pancreatectomy   Has not been checking BS here.  Nursing will check BS QID. Has an insulin pump managed by endocrinology.    Metoclopramide for gastroparesis.     Vertigo  Having a CT of head on 8/1 and ECHO on 7/31.  States she has taken meclizine at home without improvement of symptoms.     Schizoaffective disorder, depressive type (H)  Borderline personality disorder (H)  Continue with PTA amitriptyline, Abilify, Clozaril, Prozac, Lamictal, hydroxyzine, Seroquel, Lorazepam and Topamax  Belsomra for sleep          Electronically signed by:Vanessa Baptiste NP              Sincerely,        Vanessa Baptiste NP    Electronically signed

## 2025-07-29 DIAGNOSIS — S82.201A TIBIA/FIBULA FRACTURE, RIGHT, CLOSED, INITIAL ENCOUNTER: ICD-10-CM

## 2025-07-29 DIAGNOSIS — S82.401A TIBIA/FIBULA FRACTURE, RIGHT, CLOSED, INITIAL ENCOUNTER: ICD-10-CM

## 2025-07-29 DIAGNOSIS — N18.32 CKD STAGE 3B, GFR 30-44 ML/MIN (H): Primary | ICD-10-CM

## 2025-07-29 RX ORDER — HYDROMORPHONE HYDROCHLORIDE 2 MG/1
2 TABLET ORAL EVERY 4 HOURS PRN
Qty: 30 TABLET | Refills: 0 | Status: SHIPPED | OUTPATIENT
Start: 2025-07-29

## 2025-07-30 ENCOUNTER — ANCILLARY PROCEDURE (OUTPATIENT)
Dept: GENERAL RADIOLOGY | Facility: CLINIC | Age: 40
End: 2025-07-30
Attending: ORTHOPAEDIC SURGERY
Payer: COMMERCIAL

## 2025-07-30 ENCOUNTER — PRE VISIT (OUTPATIENT)
Dept: ORTHOPEDICS | Facility: CLINIC | Age: 40
End: 2025-07-30

## 2025-07-30 ENCOUNTER — OFFICE VISIT (OUTPATIENT)
Dept: ORTHOPEDICS | Facility: CLINIC | Age: 40
End: 2025-07-30
Payer: COMMERCIAL

## 2025-07-30 DIAGNOSIS — S82.201A TIBIA/FIBULA FRACTURE, RIGHT, CLOSED, INITIAL ENCOUNTER: ICD-10-CM

## 2025-07-30 DIAGNOSIS — S82.401A TIBIA/FIBULA FRACTURE, RIGHT, CLOSED, INITIAL ENCOUNTER: ICD-10-CM

## 2025-07-30 DIAGNOSIS — S82.241D: Primary | ICD-10-CM

## 2025-07-30 DIAGNOSIS — S82.241D: ICD-10-CM

## 2025-07-30 PROCEDURE — 73610 X-RAY EXAM OF ANKLE: CPT | Mod: RT | Performed by: RADIOLOGY

## 2025-07-30 PROCEDURE — 73590 X-RAY EXAM OF LOWER LEG: CPT | Mod: RT | Performed by: RADIOLOGY

## 2025-07-30 NOTE — NURSING NOTE
Reason For Visit:   Chief Complaint   Patient presents with    RECHECK     two week wound check and suture removal       LMP  (LMP Unknown)     Pain Assessment  Patient Currently in Pain: Yes (7/10)  Primary Pain Location: Leg    Deepti Formato, LPN

## 2025-07-30 NOTE — PROGRESS NOTES
[unfilled]    Suture removal    Date/Time: 7/30/2025 12:04 PM    Performed by: Rosita Weber RN  Authorized by: Veto Bain MD  Body area: lower extremity  Location details: right lower leg      UNIVERSAL PROTOCOL   Site Marked: NA  Prior Images Obtained and Reviewed:  NA  Required items: Required blood products, implants, devices and special equipment available    Patient identity confirmed:  Verbally with patient  NA - No sedation, light sedation, or local anesthesia  Confirmation Checklist:  Patient's identity using two indicators, relevant allergies, procedure was appropriate and matched the consent or emergent situation and correct equipment/implants were available  Time out: Immediately prior to the procedure a time out was called    Universal Protocol: the Joint Commission Universal Protocol was followed    Preparation: Patient was prepped and draped in usual sterile fashion    ESBL (mL):  0    Wound Appearance: clean  Sutures Removed: 11  Post-removal: dressing applied and Steri-Strips applied      PROCEDURE    Patient Tolerance:  Patient tolerated the procedure well with no immediate complications  Length of time physician/provider present for 1:1 monitoring during sedation: 0

## 2025-07-31 ENCOUNTER — HOSPITAL ENCOUNTER (OUTPATIENT)
Dept: CARDIOLOGY | Facility: CLINIC | Age: 40
End: 2025-07-31
Attending: STUDENT IN AN ORGANIZED HEALTH CARE EDUCATION/TRAINING PROGRAM
Payer: COMMERCIAL

## 2025-07-31 DIAGNOSIS — R55 SYNCOPE, UNSPECIFIED SYNCOPE TYPE: ICD-10-CM

## 2025-07-31 DIAGNOSIS — R42 DIZZINESS: ICD-10-CM

## 2025-07-31 LAB — LVEF ECHO: NORMAL

## 2025-07-31 PROCEDURE — 93306 TTE W/DOPPLER COMPLETE: CPT

## 2025-08-01 ENCOUNTER — HOSPITAL ENCOUNTER (OUTPATIENT)
Dept: CT IMAGING | Facility: CLINIC | Age: 40
Discharge: HOME | End: 2025-08-01
Attending: STUDENT IN AN ORGANIZED HEALTH CARE EDUCATION/TRAINING PROGRAM | Admitting: STUDENT IN AN ORGANIZED HEALTH CARE EDUCATION/TRAINING PROGRAM
Payer: COMMERCIAL

## 2025-08-01 DIAGNOSIS — R55 SYNCOPE, UNSPECIFIED SYNCOPE TYPE: ICD-10-CM

## 2025-08-01 DIAGNOSIS — R42 DIZZINESS: ICD-10-CM

## 2025-08-01 PROCEDURE — 70450 CT HEAD/BRAIN W/O DYE: CPT

## 2025-08-07 ENCOUNTER — TRANSITIONAL CARE UNIT VISIT (OUTPATIENT)
Dept: GERIATRICS | Facility: CLINIC | Age: 40
End: 2025-08-07
Payer: COMMERCIAL

## 2025-08-07 VITALS
BODY MASS INDEX: 38.92 KG/M2 | HEART RATE: 78 BPM | OXYGEN SATURATION: 93 % | RESPIRATION RATE: 16 BRPM | WEIGHT: 233.6 LBS | SYSTOLIC BLOOD PRESSURE: 163 MMHG | TEMPERATURE: 97.8 F | HEIGHT: 65 IN | DIASTOLIC BLOOD PRESSURE: 78 MMHG

## 2025-08-07 DIAGNOSIS — Z79.4 TYPE 2 DIABETES MELLITUS WITH DIABETIC POLYNEUROPATHY, WITH LONG-TERM CURRENT USE OF INSULIN (H): ICD-10-CM

## 2025-08-07 DIAGNOSIS — G47.9 SLEEP DISORDER: ICD-10-CM

## 2025-08-07 DIAGNOSIS — F25.1 SCHIZOAFFECTIVE DISORDER, DEPRESSIVE TYPE (H): ICD-10-CM

## 2025-08-07 DIAGNOSIS — Z98.890 S/P ORIF (OPEN REDUCTION INTERNAL FIXATION) FRACTURE: ICD-10-CM

## 2025-08-07 DIAGNOSIS — F31.9 BIPOLAR AFFECTIVE DISORDER, REMISSION STATUS UNSPECIFIED (H): Chronic | ICD-10-CM

## 2025-08-07 DIAGNOSIS — F60.3 BORDERLINE PERSONALITY DISORDER (H): ICD-10-CM

## 2025-08-07 DIAGNOSIS — Z96.41 INSULIN PUMP IN PLACE: ICD-10-CM

## 2025-08-07 DIAGNOSIS — S82.201A TIBIA/FIBULA FRACTURE, RIGHT, CLOSED, INITIAL ENCOUNTER: Primary | ICD-10-CM

## 2025-08-07 DIAGNOSIS — F41.9 ANXIETY: ICD-10-CM

## 2025-08-07 DIAGNOSIS — E78.5 HYPERLIPIDEMIA LDL GOAL <100: Chronic | ICD-10-CM

## 2025-08-07 DIAGNOSIS — R42 VERTIGO: ICD-10-CM

## 2025-08-07 DIAGNOSIS — E11.42 TYPE 2 DIABETES MELLITUS WITH DIABETIC POLYNEUROPATHY, WITH LONG-TERM CURRENT USE OF INSULIN (H): ICD-10-CM

## 2025-08-07 DIAGNOSIS — Z87.81 S/P ORIF (OPEN REDUCTION INTERNAL FIXATION) FRACTURE: ICD-10-CM

## 2025-08-07 DIAGNOSIS — S82.401A TIBIA/FIBULA FRACTURE, RIGHT, CLOSED, INITIAL ENCOUNTER: Primary | ICD-10-CM

## 2025-08-07 DIAGNOSIS — I10 ESSENTIAL HYPERTENSION: Chronic | ICD-10-CM

## 2025-08-07 NOTE — PROGRESS NOTES
Non billable as patient Unavailable for visit.   Reviewed nursing notes, VS. Stable.   [facility] 20/30. CPT 5.3/5.6.     Jamaal Cortez, CNP

## 2025-08-13 ENCOUNTER — OFFICE VISIT (OUTPATIENT)
Dept: GASTROENTEROLOGY | Facility: CLINIC | Age: 40
End: 2025-08-13
Attending: INTERNAL MEDICINE
Payer: COMMERCIAL

## 2025-08-13 ENCOUNTER — PATIENT OUTREACH (OUTPATIENT)
Dept: CARE COORDINATION | Facility: CLINIC | Age: 40
End: 2025-08-13

## 2025-08-13 VITALS
SYSTOLIC BLOOD PRESSURE: 155 MMHG | BODY MASS INDEX: 38.65 KG/M2 | DIASTOLIC BLOOD PRESSURE: 65 MMHG | HEART RATE: 78 BPM | HEIGHT: 65 IN | OXYGEN SATURATION: 97 % | WEIGHT: 232 LBS

## 2025-08-13 DIAGNOSIS — Z09 HOSPITAL DISCHARGE FOLLOW-UP: ICD-10-CM

## 2025-08-13 DIAGNOSIS — E66.01 CLASS 2 SEVERE OBESITY WITH SERIOUS COMORBIDITY AND BODY MASS INDEX (BMI) OF 38.0 TO 38.9 IN ADULT, UNSPECIFIED OBESITY TYPE (H): ICD-10-CM

## 2025-08-13 DIAGNOSIS — E66.812 CLASS 2 SEVERE OBESITY WITH SERIOUS COMORBIDITY AND BODY MASS INDEX (BMI) OF 38.0 TO 38.9 IN ADULT, UNSPECIFIED OBESITY TYPE (H): ICD-10-CM

## 2025-08-13 DIAGNOSIS — K59.04 CHRONIC IDIOPATHIC CONSTIPATION: Primary | ICD-10-CM

## 2025-08-13 ASSESSMENT — PAIN SCALES - GENERAL: PAINLEVEL_OUTOF10: MODERATE PAIN (5)

## 2025-08-14 ENCOUNTER — PATIENT OUTREACH (OUTPATIENT)
Dept: CARE COORDINATION | Facility: CLINIC | Age: 40
End: 2025-08-14
Payer: COMMERCIAL

## 2025-08-16 ENCOUNTER — HOSPITAL ENCOUNTER (EMERGENCY)
Facility: CLINIC | Age: 40
Discharge: HOME | End: 2025-08-16
Attending: EMERGENCY MEDICINE | Admitting: EMERGENCY MEDICINE
Payer: COMMERCIAL

## 2025-08-16 ENCOUNTER — APPOINTMENT (OUTPATIENT)
Dept: GENERAL RADIOLOGY | Facility: CLINIC | Age: 40
End: 2025-08-16
Attending: EMERGENCY MEDICINE
Payer: COMMERCIAL

## 2025-08-16 ENCOUNTER — APPOINTMENT (OUTPATIENT)
Dept: ULTRASOUND IMAGING | Facility: CLINIC | Age: 40
End: 2025-08-16
Attending: EMERGENCY MEDICINE
Payer: COMMERCIAL

## 2025-08-16 VITALS
RESPIRATION RATE: 14 BRPM | HEART RATE: 74 BPM | HEIGHT: 65 IN | TEMPERATURE: 98.6 F | BODY MASS INDEX: 38.99 KG/M2 | SYSTOLIC BLOOD PRESSURE: 172 MMHG | WEIGHT: 234 LBS | OXYGEN SATURATION: 97 % | DIASTOLIC BLOOD PRESSURE: 78 MMHG

## 2025-08-16 DIAGNOSIS — R60.0 BILATERAL LEG EDEMA: Primary | ICD-10-CM

## 2025-08-16 LAB
ANION GAP SERPL CALCULATED.3IONS-SCNC: 11 MMOL/L (ref 7–15)
BASOPHILS # BLD AUTO: 0.14 10E3/UL (ref 0–0.2)
BASOPHILS NFR BLD AUTO: 0.8 %
BUN SERPL-MCNC: 32.5 MG/DL (ref 6–20)
CALCIUM SERPL-MCNC: 9.4 MG/DL (ref 8.8–10.4)
CHLORIDE SERPL-SCNC: 106 MMOL/L (ref 98–107)
CREAT SERPL-MCNC: 1.58 MG/DL (ref 0.51–0.95)
EGFRCR SERPLBLD CKD-EPI 2021: 42 ML/MIN/1.73M2
EOSINOPHIL # BLD AUTO: 0.33 10E3/UL (ref 0–0.7)
EOSINOPHIL NFR BLD AUTO: 1.9 %
ERYTHROCYTE [DISTWIDTH] IN BLOOD BY AUTOMATED COUNT: 17.5 % (ref 10–15)
GLUCOSE SERPL-MCNC: 179 MG/DL (ref 70–99)
HCO3 SERPL-SCNC: 24 MMOL/L (ref 22–29)
HCT VFR BLD AUTO: 34.2 % (ref 35–47)
HGB BLD-MCNC: 10.1 G/DL (ref 11.7–15.7)
IMM GRANULOCYTES # BLD: 0.28 10E3/UL
IMM GRANULOCYTES NFR BLD: 1.6 %
LYMPHOCYTES # BLD AUTO: 3.91 10E3/UL (ref 0.8–5.3)
LYMPHOCYTES NFR BLD AUTO: 22.8 %
MCH RBC QN AUTO: 25.8 PG (ref 26.5–33)
MCHC RBC AUTO-ENTMCNC: 29.5 G/DL (ref 31.5–36.5)
MCV RBC AUTO: 87.5 FL (ref 78–100)
MONOCYTES # BLD AUTO: 0.84 10E3/UL (ref 0–1.3)
MONOCYTES NFR BLD AUTO: 4.9 %
NEUTROPHILS # BLD AUTO: 11.66 10E3/UL (ref 1.6–8.3)
NEUTROPHILS NFR BLD AUTO: 68 %
NRBC # BLD AUTO: <0.03 10E3/UL
NRBC BLD AUTO-RTO: 0.1 /100
NT-PROBNP SERPL-MCNC: <36 PG/ML (ref 0–178)
PLATELET # BLD AUTO: 383 10E3/UL (ref 150–450)
POTASSIUM SERPL-SCNC: 4.5 MMOL/L (ref 3.4–5.3)
RBC # BLD AUTO: 3.91 10E6/UL (ref 3.8–5.2)
SODIUM SERPL-SCNC: 141 MMOL/L (ref 135–145)
WBC # BLD AUTO: 17.16 10E3/UL (ref 4–11)

## 2025-08-16 PROCEDURE — 36415 COLL VENOUS BLD VENIPUNCTURE: CPT | Performed by: EMERGENCY MEDICINE

## 2025-08-16 PROCEDURE — 73590 X-RAY EXAM OF LOWER LEG: CPT | Mod: RT

## 2025-08-16 PROCEDURE — 93970 EXTREMITY STUDY: CPT

## 2025-08-16 PROCEDURE — 73562 X-RAY EXAM OF KNEE 3: CPT | Mod: RT

## 2025-08-16 PROCEDURE — 83880 ASSAY OF NATRIURETIC PEPTIDE: CPT | Performed by: EMERGENCY MEDICINE

## 2025-08-16 PROCEDURE — 85025 COMPLETE CBC W/AUTO DIFF WBC: CPT | Performed by: EMERGENCY MEDICINE

## 2025-08-16 PROCEDURE — 93005 ELECTROCARDIOGRAM TRACING: CPT

## 2025-08-16 PROCEDURE — 80048 BASIC METABOLIC PNL TOTAL CA: CPT | Performed by: EMERGENCY MEDICINE

## 2025-08-16 PROCEDURE — 99285 EMERGENCY DEPT VISIT HI MDM: CPT | Mod: 25 | Performed by: EMERGENCY MEDICINE

## 2025-08-16 PROCEDURE — 99283 EMERGENCY DEPT VISIT LOW MDM: CPT | Performed by: EMERGENCY MEDICINE

## 2025-08-16 ASSESSMENT — ACTIVITIES OF DAILY LIVING (ADL)
ADLS_ACUITY_SCORE: 56

## 2025-08-16 ASSESSMENT — COLUMBIA-SUICIDE SEVERITY RATING SCALE - C-SSRS
4. HAVE YOU HAD THESE THOUGHTS AND HAD SOME INTENTION OF ACTING ON THEM?: NO
3. HAVE YOU BEEN THINKING ABOUT HOW YOU MIGHT KILL YOURSELF?: NO
5. HAVE YOU STARTED TO WORK OUT OR WORKED OUT THE DETAILS OF HOW TO KILL YOURSELF? DO YOU INTEND TO CARRY OUT THIS PLAN?: NO
1. IN THE PAST MONTH, HAVE YOU WISHED YOU WERE DEAD OR WISHED YOU COULD GO TO SLEEP AND NOT WAKE UP?: YES
6. HAVE YOU EVER DONE ANYTHING, STARTED TO DO ANYTHING, OR PREPARED TO DO ANYTHING TO END YOUR LIFE?: YES
2. HAVE YOU ACTUALLY HAD ANY THOUGHTS OF KILLING YOURSELF IN THE PAST MONTH?: YES

## 2025-08-17 LAB
ATRIAL RATE - MUSE: 74 BPM
DIASTOLIC BLOOD PRESSURE - MUSE: NORMAL MMHG
INTERPRETATION ECG - MUSE: NORMAL
P AXIS - MUSE: 31 DEGREES
PR INTERVAL - MUSE: 146 MS
QRS DURATION - MUSE: 84 MS
QT - MUSE: 390 MS
QTC - MUSE: 432 MS
R AXIS - MUSE: 29 DEGREES
SYSTOLIC BLOOD PRESSURE - MUSE: NORMAL MMHG
T AXIS - MUSE: 62 DEGREES
VENTRICULAR RATE- MUSE: 74 BPM

## 2025-08-18 ENCOUNTER — TELEPHONE (OUTPATIENT)
Dept: FAMILY MEDICINE | Facility: CLINIC | Age: 40
End: 2025-08-18
Payer: COMMERCIAL

## 2025-08-18 ENCOUNTER — PATIENT OUTREACH (OUTPATIENT)
Dept: CARE COORDINATION | Facility: CLINIC | Age: 40
End: 2025-08-18
Payer: COMMERCIAL

## 2025-08-20 ENCOUNTER — TELEPHONE (OUTPATIENT)
Dept: BEHAVIORAL HEALTH | Facility: CLINIC | Age: 40
End: 2025-08-20

## 2025-08-25 ENCOUNTER — LAB (OUTPATIENT)
Dept: LAB | Facility: CLINIC | Age: 40
End: 2025-08-25
Payer: COMMERCIAL

## 2025-08-25 ENCOUNTER — OFFICE VISIT (OUTPATIENT)
Dept: NEPHROLOGY | Facility: CLINIC | Age: 40
End: 2025-08-25
Payer: COMMERCIAL

## 2025-08-25 DIAGNOSIS — E87.5 HYPERKALEMIA: ICD-10-CM

## 2025-08-25 DIAGNOSIS — Z13.6 SCREENING FOR CARDIOVASCULAR CONDITION: Primary | ICD-10-CM

## 2025-08-25 DIAGNOSIS — N18.30 TYPE 2 DIABETES MELLITUS WITH STAGE 3 CHRONIC KIDNEY DISEASE, WITH LONG-TERM CURRENT USE OF INSULIN (H): ICD-10-CM

## 2025-08-25 DIAGNOSIS — E11.22 TYPE 2 DIABETES MELLITUS WITH STAGE 3 CHRONIC KIDNEY DISEASE, WITH LONG-TERM CURRENT USE OF INSULIN (H): ICD-10-CM

## 2025-08-25 DIAGNOSIS — Z79.4 TYPE 2 DIABETES MELLITUS WITH STAGE 3 CHRONIC KIDNEY DISEASE, WITH LONG-TERM CURRENT USE OF INSULIN (H): ICD-10-CM

## 2025-08-25 DIAGNOSIS — N18.32 CKD STAGE 3B, GFR 30-44 ML/MIN (H): ICD-10-CM

## 2025-08-25 DIAGNOSIS — D64.9 ANEMIA, UNSPECIFIED TYPE: ICD-10-CM

## 2025-08-25 LAB
ALBUMIN SERPL BCG-MCNC: 3.7 G/DL (ref 3.5–5.2)
ANION GAP SERPL CALCULATED.3IONS-SCNC: 13 MMOL/L (ref 7–15)
BUN SERPL-MCNC: 29.9 MG/DL (ref 6–20)
CALCIUM SERPL-MCNC: 8.8 MG/DL (ref 8.8–10.4)
CHLORIDE SERPL-SCNC: 105 MMOL/L (ref 98–107)
CHOLEST SERPL-MCNC: 155 MG/DL
CREAT SERPL-MCNC: 1.61 MG/DL (ref 0.51–0.95)
EGFRCR SERPLBLD CKD-EPI 2021: 41 ML/MIN/1.73M2
ERYTHROCYTE [DISTWIDTH] IN BLOOD BY AUTOMATED COUNT: 18 % (ref 10–15)
FASTING STATUS PATIENT QL REPORTED: NO
FERRITIN SERPL-MCNC: 32 NG/ML (ref 6–175)
GLUCOSE SERPL-MCNC: 306 MG/DL (ref 70–99)
HCO3 SERPL-SCNC: 23 MMOL/L (ref 22–29)
HCT VFR BLD AUTO: 33.9 % (ref 35–47)
HDLC SERPL-MCNC: 59 MG/DL
HGB BLD-MCNC: 10.1 G/DL (ref 11.7–15.7)
IRON BINDING CAPACITY (ROCHE): 326 UG/DL (ref 240–430)
IRON SATN MFR SERPL: 13 % (ref 15–46)
IRON SERPL-MCNC: 43 UG/DL (ref 37–145)
LDLC SERPL CALC-MCNC: 71 MG/DL
MCH RBC QN AUTO: 26 PG (ref 26.5–33)
MCHC RBC AUTO-ENTMCNC: 29.8 G/DL (ref 31.5–36.5)
MCV RBC AUTO: 87.1 FL (ref 78–100)
NONHDLC SERPL-MCNC: 96 MG/DL
PHOSPHATE SERPL-MCNC: 4.3 MG/DL (ref 2.5–4.5)
PLATELET # BLD AUTO: 489 10E3/UL (ref 150–450)
POTASSIUM SERPL-SCNC: 4.4 MMOL/L (ref 3.4–5.3)
RBC # BLD AUTO: 3.89 10E6/UL (ref 3.8–5.2)
SODIUM SERPL-SCNC: 141 MMOL/L (ref 135–145)
TRIGL SERPL-MCNC: 124 MG/DL
WBC # BLD AUTO: 14.69 10E3/UL (ref 4–11)

## 2025-08-25 PROCEDURE — 85027 COMPLETE CBC AUTOMATED: CPT

## 2025-08-25 PROCEDURE — 82728 ASSAY OF FERRITIN: CPT

## 2025-08-25 PROCEDURE — 83550 IRON BINDING TEST: CPT

## 2025-08-25 PROCEDURE — 83540 ASSAY OF IRON: CPT

## 2025-08-25 PROCEDURE — 80069 RENAL FUNCTION PANEL: CPT

## 2025-08-25 PROCEDURE — 80061 LIPID PANEL: CPT

## 2025-08-27 ENCOUNTER — OFFICE VISIT (OUTPATIENT)
Dept: FAMILY MEDICINE | Facility: CLINIC | Age: 40
End: 2025-08-27
Payer: COMMERCIAL

## 2025-08-27 ENCOUNTER — OFFICE VISIT (OUTPATIENT)
Dept: ORTHOPEDICS | Facility: CLINIC | Age: 40
End: 2025-08-27
Payer: COMMERCIAL

## 2025-08-27 DIAGNOSIS — E66.01 MORBID OBESITY (H): Primary | ICD-10-CM

## 2025-08-27 DIAGNOSIS — R42 DIZZINESS: ICD-10-CM

## 2025-08-27 DIAGNOSIS — I10 ESSENTIAL HYPERTENSION: Chronic | ICD-10-CM

## 2025-08-27 PROCEDURE — 1125F AMNT PAIN NOTED PAIN PRSNT: CPT | Performed by: NURSE PRACTITIONER

## 2025-08-27 PROCEDURE — 3077F SYST BP >= 140 MM HG: CPT | Performed by: NURSE PRACTITIONER

## 2025-08-27 PROCEDURE — 3078F DIAST BP <80 MM HG: CPT | Performed by: NURSE PRACTITIONER

## 2025-08-27 PROCEDURE — 99213 OFFICE O/P EST LOW 20 MIN: CPT | Performed by: NURSE PRACTITIONER

## 2025-08-27 ASSESSMENT — PAIN SCALES - GENERAL: PAINLEVEL_OUTOF10: MILD PAIN (1)

## 2025-08-28 ENCOUNTER — PATIENT OUTREACH (OUTPATIENT)
Dept: CARE COORDINATION | Facility: CLINIC | Age: 40
End: 2025-08-28
Payer: COMMERCIAL

## 2025-08-28 VITALS
SYSTOLIC BLOOD PRESSURE: 146 MMHG | TEMPERATURE: 99.3 F | BODY MASS INDEX: 40.15 KG/M2 | DIASTOLIC BLOOD PRESSURE: 50 MMHG | RESPIRATION RATE: 20 BRPM | HEART RATE: 75 BPM | OXYGEN SATURATION: 95 % | WEIGHT: 241 LBS | HEIGHT: 65 IN

## 2025-08-28 PROBLEM — I95.1 ORTHOSTATIC HYPOTENSION: Status: RESOLVED | Noted: 2017-01-10 | Resolved: 2025-08-28

## 2025-09-01 ENCOUNTER — PATIENT OUTREACH (OUTPATIENT)
Dept: CARE COORDINATION | Facility: CLINIC | Age: 40
End: 2025-09-01
Payer: COMMERCIAL

## 2025-09-02 ENCOUNTER — OFFICE VISIT (OUTPATIENT)
Dept: ENDOCRINOLOGY | Facility: CLINIC | Age: 40
End: 2025-09-02
Payer: COMMERCIAL

## 2025-09-02 VITALS
BODY MASS INDEX: 40.17 KG/M2 | HEART RATE: 85 BPM | DIASTOLIC BLOOD PRESSURE: 75 MMHG | WEIGHT: 241.4 LBS | OXYGEN SATURATION: 91 % | SYSTOLIC BLOOD PRESSURE: 146 MMHG

## 2025-09-02 DIAGNOSIS — Z79.4 TYPE 2 DIABETES MELLITUS WITH STAGE 3B CHRONIC KIDNEY DISEASE, WITH LONG-TERM CURRENT USE OF INSULIN (H): Primary | ICD-10-CM

## 2025-09-02 DIAGNOSIS — E11.22 TYPE 2 DIABETES MELLITUS WITH STAGE 3B CHRONIC KIDNEY DISEASE, WITH LONG-TERM CURRENT USE OF INSULIN (H): Primary | ICD-10-CM

## 2025-09-02 DIAGNOSIS — N18.32 TYPE 2 DIABETES MELLITUS WITH STAGE 3B CHRONIC KIDNEY DISEASE, WITH LONG-TERM CURRENT USE OF INSULIN (H): Primary | ICD-10-CM

## 2025-09-02 PROBLEM — S82.401D CLOSED FRACTURE OF SHAFT OF RIGHT TIBIA AND FIBULA WITH ROUTINE HEALING: Status: ACTIVE | Noted: 2025-09-02

## 2025-09-02 PROBLEM — S82.201D CLOSED FRACTURE OF SHAFT OF RIGHT TIBIA AND FIBULA WITH ROUTINE HEALING: Status: ACTIVE | Noted: 2025-09-02

## 2025-09-02 PROCEDURE — 3078F DIAST BP <80 MM HG: CPT | Performed by: NURSE PRACTITIONER

## 2025-09-02 PROCEDURE — 95251 CONT GLUC MNTR ANALYSIS I&R: CPT | Performed by: NURSE PRACTITIONER

## 2025-09-02 PROCEDURE — G2211 COMPLEX E/M VISIT ADD ON: HCPCS | Performed by: NURSE PRACTITIONER

## 2025-09-02 PROCEDURE — 3077F SYST BP >= 140 MM HG: CPT | Performed by: NURSE PRACTITIONER

## 2025-09-02 PROCEDURE — 99214 OFFICE O/P EST MOD 30 MIN: CPT | Performed by: NURSE PRACTITIONER

## 2025-09-03 ENCOUNTER — MYC MEDICAL ADVICE (OUTPATIENT)
Dept: ORTHOPEDICS | Facility: CLINIC | Age: 40
End: 2025-09-03

## (undated) DEVICE — NDL BIOPSY TEMNO 18GAX15CM ACT1815

## (undated) DEVICE — WIRE GUIDE BENSON STR .035X50CM G00661

## (undated) DEVICE — CATH IV 16X1-1/4 PROTECTIV 326210

## (undated) DEVICE — Device

## (undated) DEVICE — BLADE KNIFE SURG 11 371111

## (undated) DEVICE — GOWN XLG DISP 9545

## (undated) DEVICE — TOURNIQUET CUFF 34" REPRO BROWN 60-7070-106

## (undated) DEVICE — DRAPE STERI U 1015

## (undated) DEVICE — SU VICRYL 1 CT-1 36" UND J947H

## (undated) DEVICE — LIGHT HANDLE X2

## (undated) DEVICE — LOCKING SCREW
Type: IMPLANTABLE DEVICE | Site: TIBIA | Status: NON-FUNCTIONAL
Brand: T2 ALPHA

## (undated) DEVICE — DRSG ABDOMINAL 07 1/2X8" 7197D

## (undated) DEVICE — SYR 03ML LL W/O NDL 309657

## (undated) DEVICE — LINEN BACK PACK 5440

## (undated) DEVICE — PACK LOWER EXTREMITY RIVERSIDE SOP32LEFSX

## (undated) DEVICE — SPECIMEN CONTAINER 60MLW/10% FORMALIN 59601

## (undated) DEVICE — DRSG PRIMAPORE 02X3" 7133

## (undated) DEVICE — DRILL BIT 2.6X135MM SCALED 703

## (undated) DEVICE — REAMER SHAFT MODIFIED TRINKLE 8X510MM 0227-8510S

## (undated) DEVICE — IMP BONE SCR T10 FT 3.5MM / L26MM 657426: Type: IMPLANTABLE DEVICE | Site: FIBULA | Status: NON-FUNCTIONAL

## (undated) DEVICE — SOL NACL 0.9% INJ 250ML BAG 2B1322Q

## (undated) DEVICE — SOLUTION IRRIGATION 0.9% NACL 1000ML BOTTLE R5200-01

## (undated) DEVICE — LINEN TOWEL PACK X5 5464

## (undated) DEVICE — DRILL SRG 360MM 4.2MM T2 ALPHA LCK 2352-4236S

## (undated) DEVICE — IMPLANTABLE DEVICE: Type: IMPLANTABLE DEVICE | Site: FIBULA | Status: NON-FUNCTIONAL

## (undated) DEVICE — GLOVE PROTEXIS MICRO 7.0 LT BLUE 2D73PM70

## (undated) DEVICE — BNDG ELASTIC 4" DBL LENGTH UNSTERILE 6611-14

## (undated) DEVICE — GELFOAM 7X12MM

## (undated) DEVICE — SPONGE LAP 18X18" X8435

## (undated) DEVICE — SCREW LOCKING T10 3.5X12MM: Type: IMPLANTABLE DEVICE | Site: FIBULA | Status: NON-FUNCTIONAL

## (undated) DEVICE — SLEEVE HLD 8-11MM T2 NL ELC INS STRL

## (undated) DEVICE — DRSG XEROFORM 5X9" 8884431605

## (undated) DEVICE — DRAPE C-ARM W/STRAPS 42X72" 07-CA104

## (undated) DEVICE — GLOVE PROTEXIS POWDER FREE SMT 7.0  2D72PT70X

## (undated) DEVICE — ESU GROUND PAD UNIVERSAL W/O CORD

## (undated) DEVICE — SU ETHILON 3-0 PS-1 18" 1663H

## (undated) DEVICE — PREP CHLORAPREP 26ML TINTED HI-LITE ORANGE 930815

## (undated) DEVICE — DRSG GAUZE 4X8" NON21842

## (undated) DEVICE — SU MONOCRYL 3-0 PS-2 27" Y427H

## (undated) DEVICE — SUCTION MANIFOLD NEPTUNE 2 SYS 4 PORT 0702-020-000

## (undated) DEVICE — SUTURE VICRYL+ 2-0 CT-2 27" UND VCP269H

## (undated) DEVICE — WIRE GUIDE STRK ASNIS III 1.4X150MM 702459

## (undated) DEVICE — DRILL BIT TWIST STRK CAN 2.7MM W/AO COUPLING 702449

## (undated) DEVICE — COVER ULTRASOUND PROBE W/GEL FLEXI-FEEL 6"X58" LF  25-FF658

## (undated) DEVICE — CAST PADDING 4" STERILE 9044S

## (undated) DEVICE — SOLUTION WATER 1000ML BOTTLE R5000-01

## (undated) DEVICE — DECANTER BAG 2002S

## (undated) DEVICE — CAST PLASTER SPLINT 5X30" 7395

## (undated) RX ORDER — FENTANYL CITRATE 50 UG/ML
INJECTION, SOLUTION INTRAMUSCULAR; INTRAVENOUS
Status: DISPENSED
Start: 2023-09-18

## (undated) RX ORDER — PROPOFOL 10 MG/ML
INJECTION, EMULSION INTRAVENOUS
Status: DISPENSED
Start: 2025-07-13

## (undated) RX ORDER — FENTANYL CITRATE-0.9 % NACL/PF 10 MCG/ML
PLASTIC BAG, INJECTION (ML) INTRAVENOUS
Status: DISPENSED
Start: 2025-07-13

## (undated) RX ORDER — ONDANSETRON 2 MG/ML
INJECTION INTRAMUSCULAR; INTRAVENOUS
Status: DISPENSED
Start: 2025-07-13

## (undated) RX ORDER — ALBUTEROL SULFATE 90 UG/1
INHALANT RESPIRATORY (INHALATION)
Status: DISPENSED
Start: 2025-07-13

## (undated) RX ORDER — DEXAMETHASONE SODIUM PHOSPHATE 4 MG/ML
INJECTION, SOLUTION INTRA-ARTICULAR; INTRALESIONAL; INTRAMUSCULAR; INTRAVENOUS; SOFT TISSUE
Status: DISPENSED
Start: 2025-07-13

## (undated) RX ORDER — GLYCOPYRROLATE 0.2 MG/ML
INJECTION, SOLUTION INTRAMUSCULAR; INTRAVENOUS
Status: DISPENSED
Start: 2025-07-13

## (undated) RX ORDER — CEFAZOLIN SODIUM/WATER 2 G/20 ML
SYRINGE (ML) INTRAVENOUS
Status: DISPENSED
Start: 2025-07-13

## (undated) RX ORDER — GABAPENTIN 300 MG/1
CAPSULE ORAL
Status: DISPENSED
Start: 2019-11-14

## (undated) RX ORDER — FENTANYL CITRATE 50 UG/ML
INJECTION, SOLUTION INTRAMUSCULAR; INTRAVENOUS
Status: DISPENSED
Start: 2025-07-13

## (undated) RX ORDER — FENTANYL CITRATE 0.05 MG/ML
INJECTION, SOLUTION INTRAMUSCULAR; INTRAVENOUS
Status: DISPENSED
Start: 2025-07-13